# Patient Record
Sex: FEMALE | Race: WHITE | NOT HISPANIC OR LATINO | Employment: OTHER | URBAN - METROPOLITAN AREA
[De-identification: names, ages, dates, MRNs, and addresses within clinical notes are randomized per-mention and may not be internally consistent; named-entity substitution may affect disease eponyms.]

---

## 2018-09-22 ENCOUNTER — APPOINTMENT (EMERGENCY)
Dept: CT IMAGING | Facility: HOSPITAL | Age: 63
End: 2018-09-22
Payer: COMMERCIAL

## 2018-09-22 ENCOUNTER — HOSPITAL ENCOUNTER (EMERGENCY)
Facility: HOSPITAL | Age: 63
Discharge: HOME | End: 2018-09-22
Attending: EMERGENCY MEDICINE | Admitting: EMERGENCY MEDICINE
Payer: COMMERCIAL

## 2018-09-22 ENCOUNTER — APPOINTMENT (EMERGENCY)
Dept: RADIOLOGY | Facility: HOSPITAL | Age: 63
End: 2018-09-22
Payer: COMMERCIAL

## 2018-09-22 VITALS
SYSTOLIC BLOOD PRESSURE: 106 MMHG | DIASTOLIC BLOOD PRESSURE: 56 MMHG | RESPIRATION RATE: 16 BRPM | TEMPERATURE: 98.4 F | OXYGEN SATURATION: 96 % | HEART RATE: 83 BPM | WEIGHT: 134.2 LBS
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
## 2018-09-22 DIAGNOSIS — E04.1 THYROID NODULE: ICD-10-CM

## 2018-09-22 DIAGNOSIS — M47.812 DJD (DEGENERATIVE JOINT DISEASE) OF CERVICAL SPINE: ICD-10-CM

## 2018-09-22 LAB
ALBUMIN SERPL BCP-MCNC: 3 G/DL (ref 3.5–5)
ALP SERPL-CCNC: 38 U/L (ref 46–116)
ALT SERPL W P-5'-P-CCNC: 17 U/L (ref 12–78)
ANION GAP SERPL CALCULATED.3IONS-SCNC: 9 MMOL/L (ref 4–13)
AST SERPL W P-5'-P-CCNC: 15 U/L (ref 5–45)
BASOPHILS # BLD AUTO: 0.02 THOUSANDS/ΜL (ref 0–0.1)
BASOPHILS NFR BLD AUTO: 0 % (ref 0–1)
BILIRUB SERPL-MCNC: 0.28 MG/DL (ref 0.2–1)
BUN SERPL-MCNC: 17 MG/DL (ref 5–25)
CALCIUM SERPL-MCNC: 8.8 MG/DL (ref 8.3–10.1)
CHLORIDE SERPL-SCNC: 108 MMOL/L (ref 100–108)
CO2 SERPL-SCNC: 26 MMOL/L (ref 21–32)
CREAT SERPL-MCNC: 0.85 MG/DL (ref 0.6–1.3)
EOSINOPHIL # BLD AUTO: 0 THOUSAND/ΜL (ref 0–0.61)
EOSINOPHIL NFR BLD AUTO: 0 % (ref 0–6)
ERYTHROCYTE [DISTWIDTH] IN BLOOD BY AUTOMATED COUNT: 13.2 % (ref 11.6–15.1)
GFR SERPL CREATININE-BSD FRML MDRD: 73 ML/MIN/1.73SQ M
GLUCOSE SERPL-MCNC: 97 MG/DL (ref 65–140)
HCT VFR BLD AUTO: 37.4 % (ref 34.8–46.1)
HGB BLD-MCNC: 12.1 G/DL (ref 11.5–15.4)
IMM GRANULOCYTES # BLD AUTO: 0.02 THOUSAND/UL (ref 0–0.2)
IMM GRANULOCYTES NFR BLD AUTO: 0 % (ref 0–2)
LYMPHOCYTES # BLD AUTO: 1.03 THOUSANDS/ΜL (ref 0.6–4.47)
LYMPHOCYTES NFR BLD AUTO: 17 % (ref 14–44)
MCH RBC QN AUTO: 31.1 PG (ref 26.8–34.3)
MCHC RBC AUTO-ENTMCNC: 32.4 G/DL (ref 31.4–37.4)
MCV RBC AUTO: 96 FL (ref 82–98)
MONOCYTES # BLD AUTO: 0.71 THOUSAND/ΜL (ref 0.17–1.22)
MONOCYTES NFR BLD AUTO: 12 % (ref 4–12)
NEUTROPHILS # BLD AUTO: 4.14 THOUSANDS/ΜL (ref 1.85–7.62)
NEUTS SEG NFR BLD AUTO: 71 % (ref 43–75)
NRBC BLD AUTO-RTO: 0 /100 WBCS
PLATELET # BLD AUTO: 191 THOUSANDS/UL (ref 149–390)
PMV BLD AUTO: 9.4 FL (ref 8.9–12.7)
POTASSIUM SERPL-SCNC: 4.2 MMOL/L (ref 3.5–5.3)
PROT SERPL-MCNC: 6 G/DL (ref 6.4–8.2)
RBC # BLD AUTO: 3.89 MILLION/UL (ref 3.81–5.12)
SODIUM SERPL-SCNC: 143 MMOL/L (ref 136–145)
TROPONIN I SERPL-MCNC: <0.02 NG/ML
WBC # BLD AUTO: 5.92 THOUSAND/UL (ref 4.31–10.16)

## 2018-09-22 PROCEDURE — 84484 ASSAY OF TROPONIN QUANT: CPT | Performed by: EMERGENCY MEDICINE

## 2018-09-22 PROCEDURE — 70450 CT HEAD/BRAIN W/O DYE: CPT

## 2018-09-22 PROCEDURE — 80053 COMPREHEN METABOLIC PANEL: CPT | Performed by: EMERGENCY MEDICINE

## 2018-09-22 PROCEDURE — 71046 X-RAY EXAM CHEST 2 VIEWS: CPT

## 2018-09-22 PROCEDURE — 99285 EMERGENCY DEPT VISIT HI MDM: CPT

## 2018-09-22 PROCEDURE — 93005 ELECTROCARDIOGRAM TRACING: CPT

## 2018-09-22 PROCEDURE — 72125 CT NECK SPINE W/O DYE: CPT

## 2018-09-22 PROCEDURE — 36415 COLL VENOUS BLD VENIPUNCTURE: CPT

## 2018-09-22 PROCEDURE — 85025 COMPLETE CBC W/AUTO DIFF WBC: CPT | Performed by: EMERGENCY MEDICINE

## 2018-09-22 RX ORDER — NAPROXEN 500 MG/1
500 TABLET ORAL 2 TIMES DAILY WITH MEALS
Qty: 10 TABLET | Refills: 0 | Status: SHIPPED | OUTPATIENT
Start: 2018-09-22 | End: 2021-06-28 | Stop reason: HOSPADM

## 2018-09-22 RX ORDER — LORAZEPAM 1 MG/1
1 TABLET ORAL
Status: ON HOLD | COMMUNITY
End: 2022-03-08 | Stop reason: SDUPTHER

## 2018-09-22 RX ORDER — KETOROLAC TROMETHAMINE 30 MG/ML
15 INJECTION, SOLUTION INTRAMUSCULAR; INTRAVENOUS ONCE
Status: DISCONTINUED | OUTPATIENT
Start: 2018-09-22 | End: 2018-09-22 | Stop reason: HOSPADM

## 2018-09-22 RX ORDER — BUPROPION HYDROCHLORIDE 100 MG/1
100 TABLET ORAL 2 TIMES DAILY
COMMUNITY

## 2018-09-22 RX ORDER — MORPHINE SULFATE 15 MG/1
15 TABLET, FILM COATED, EXTENDED RELEASE ORAL 2 TIMES DAILY
Status: ON HOLD | COMMUNITY
End: 2022-03-08 | Stop reason: SDUPTHER

## 2018-09-22 RX ORDER — CARVEDILOL 25 MG/1
25 TABLET ORAL 2 TIMES DAILY WITH MEALS
COMMUNITY

## 2018-09-22 RX ORDER — ACETAMINOPHEN 325 MG/1
650 TABLET ORAL ONCE
Status: COMPLETED | OUTPATIENT
Start: 2018-09-22 | End: 2018-09-22

## 2018-09-22 RX ORDER — DULOXETIN HYDROCHLORIDE 60 MG/1
60 CAPSULE, DELAYED RELEASE ORAL 2 TIMES DAILY
COMMUNITY
End: 2021-06-28 | Stop reason: HOSPADM

## 2018-09-22 RX ADMIN — ACETAMINOPHEN 650 MG: 325 TABLET, FILM COATED ORAL at 14:23
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
## 2018-09-22 NOTE — DISCHARGE INSTRUCTIONS
Neck Pain   WHAT YOU NEED TO KNOW:   You may have sudden neck pain that increases quickly  You may instead feel pain build slowly over time  Neck pain may go away in a few days or weeks, or it may continue for months  The pain may come and go, or be worse with certain movements  The pain may be only in your neck, or it may move to your arms, back, or shoulders  You may also have pain that starts in another body area and moves to your neck  Some types of neck pain are permanent  DISCHARGE INSTRUCTIONS:   Return to the emergency department if:   · You have an injury that causes neck pain and shooting pain down your arms or legs  · Your neck pain suddenly becomes severe  · You have neck pain along with numbness, tingling, or weakness in your arms or legs  · You have a stiff neck, a headache, and a fever  Contact your healthcare provider if:   · You have new or worsening symptoms  · Your symptoms continue even after treatment  · You have questions or concerns about your condition or care  Medicines: You may need any of the following:  · NSAIDs  , such as ibuprofen, help decrease swelling, pain, and fever  This medicine is available without a doctor's order  Ask your healthcare provider which medicine to take and how often to take it  Follow directions  NSAIDs can cause stomach bleeding or kidney problems if not taken correctly  If you take blood thinner medicine, always ask if NSAIDs are safe for you  · Acetaminophen  helps decrease pain and fever  Ask your healthcare provider how much to take and how often to take it  Follow directions  Acetaminophen can cause liver damage if not taken correctly  · Steroid medicine  may be used to reduce inflammation  This can help relieve pain caused by swelling  · Take your medicine as directed  Contact your healthcare provider if you think your medicine is not helping or if you have side effects  Tell him or her if you are allergic to any medicine  Keep a list of the medicines, vitamins, and herbs you take  Include the amounts, and when and why you take them  Bring the list or the pill bottles to follow-up visits  Carry your medicine list with you in case of an emergency  Manage or prevent neck pain:   · Rest your neck as directed  Do not make sudden movements, such as turning your head quickly  Your healthcare provider may recommend you wear a cervical collar for a short time  The collar will prevent you from moving your head  This will give your neck time to heal if an injury is causing your neck pain  Ask your healthcare provider when you can return to sports or other normal daily activities  · Apply heat as directed  Heat helps relieve pain and swelling  Use a heat wrap, or soak a small towel in warm water  Wring out the extra water  Apply the heat wrap or towel for 20 minutes every hour, or as directed  · Apply ice as directed  Ice helps relieve pain and swelling, and can help prevent tissue damage  Use an ice pack, or put ice in a bag  Cover the ice pack or back with a towel before you apply it to your neck  Apply the ice pack or ice for 15 minutes every hour, or as directed  Your healthcare provider can tell you how often to apply ice  · Do neck exercises as directed  Neck exercises help strengthen the muscles and increase range of motion  Your healthcare provider will tell you which exercises are right for you  He may give you instructions, or he may recommend that you work with a physical therapist  Your healthcare provider or therapist can make sure you are doing the exercises correctly  · Maintain good posture  Try to keep your head and shoulders lifted when you sit  If you work in front of a computer, make sure the monitor is at the right level  You should not need to look up down to see the screen  You should also not have to lean forward to be able to read what is on the screen   Make sure your keyboard, mouse, and other computer items are placed where you do not have to extend your shoulder to reach them  Get up often if you work in front of a computer or sit for long periods of time  Stretch or walk around to keep your neck muscles loose  Follow up with your healthcare provider as directed: Your healthcare provider may refer you to a specialist if your pain does not get better with treatment  Write down your questions so you remember to ask them during your visits  © 2017 [facility] Information is for End User's use only and may not be sold, redistributed or otherwise used for commercial purposes  All illustrations and images included in CareNotes® are the copyrighted property of A D A M [employer] Inc  or [employer]  The above information is an  only  It is not intended as medical advice for individual conditions or treatments  Talk to your doctor, nurse or pharmacist before following any medical regimen to see if it is safe and effective for you  Thyroid Nodules   WHAT YOU NEED TO KNOW:   Thyroid nodules are growths on your thyroid gland  Your thyroid makes hormones that help control your body temperature, heart rate, and growth  The hormones also control how fast your body uses food for energy  Some nodules are lumps of tissue, and others are filled with fluid  DISCHARGE INSTRUCTIONS:   Medicines:   · Thyroid medicine  is given to bring your thyroid hormone levels back to a normal range  · Radioactive iodine  is given to damage cells in your thyroid gland and decrease the size of your nodules  · Take your medicine as directed  Contact your healthcare provider if you think your medicine is not helping or if you have side effects  Tell him or her if you are allergic to any medicine  Keep a list of the medicines, vitamins, and herbs you take  Include the amounts, and when and why you take them  Bring the list or the pill bottles to follow-up visits   Carry your medicine list with you in case of an emergency  Follow up with your healthcare provider as directed: You may need frequent blood tests to check your thyroid hormone levels  You may also need tests such as an ultrasound to check if any nodules are growing or have returned  If you had surgery, follow directions about how to care for your wound  Write down your questions so you remember to ask them during your visits  Eat iodine-rich foods:  Examples include fish, seaweed, dairy products, eggs, beans, and lean meat  Iodized salt also contains iodine  You may need to use iodized table salt when you cook and season your food  Iodine may be added to bread or to your drinking water  Ask for a list of foods that contain iodine, and ask how much iodine you need each day  Contact your healthcare provider if:   · You have a new cough that does not improve  · You begin choking or have new or increased trouble swallowing  · Your voice becomes hoarse  · You are losing weight without trying  · You have questions or concerns about your condition or care  Seek immediate care or call 911 if:   · You have redness, swelling, or drainage at your surgery site  · You have sudden chest pain or trouble breathing  · Your symptoms worsen, even after you take your medicines  © 2017 [address]  Information is for End User's use only and may not be sold, redistributed or otherwise used for commercial purposes  All illustrations and images included in CareNotes® are the copyrighted property of [employer]  or [employer] Schilling  The above information is an  only  It is not intended as medical advice for individual conditions or treatments  Talk to your doctor, nurse or pharmacist before following any medical regimen to see if it is safe and effective for you
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
## 2018-09-23 LAB
ATRIAL RATE: 78 BPM
P AXIS: 66 DEGREES
PR INTERVAL: 170 MS
QRS AXIS: 49 DEGREES
QRSD INTERVAL: 94 MS
QT INTERVAL: 358 MS
QTC INTERVAL: 408 MS
T WAVE AXIS: 70 DEGREES
VENTRICULAR RATE: 78 BPM

## 2018-09-23 PROCEDURE — 93010 ELECTROCARDIOGRAM REPORT: CPT | Performed by: INTERNAL MEDICINE

## 2020-01-20 ENCOUNTER — APPOINTMENT (EMERGENCY)
Dept: RADIOLOGY | Facility: HOSPITAL | Age: 65
End: 2020-01-20
Payer: MEDICARE

## 2020-01-20 ENCOUNTER — HOSPITAL ENCOUNTER (EMERGENCY)
Facility: HOSPITAL | Age: 65
Discharge: HOME | End: 2020-01-20
Attending: EMERGENCY MEDICINE | Admitting: EMERGENCY MEDICINE
Payer: MEDICARE

## 2020-01-20 VITALS
DIASTOLIC BLOOD PRESSURE: 67 MMHG | HEART RATE: 89 BPM | RESPIRATION RATE: 18 BRPM | TEMPERATURE: 97.8 F | SYSTOLIC BLOOD PRESSURE: 137 MMHG | WEIGHT: 140.43 LBS | OXYGEN SATURATION: 99 %

## 2020-01-20 DIAGNOSIS — R07.81 RIB PAIN ON RIGHT SIDE: Primary | ICD-10-CM

## 2020-01-20 PROCEDURE — 99282 EMERGENCY DEPT VISIT SF MDM: CPT | Performed by: PHYSICIAN ASSISTANT

## 2020-01-20 PROCEDURE — 99283 EMERGENCY DEPT VISIT LOW MDM: CPT

## 2020-01-20 PROCEDURE — 71101 X-RAY EXAM UNILAT RIBS/CHEST: CPT

## 2020-01-21 NOTE — DISCHARGE INSTRUCTIONS
Continue Tylenol and ibuprofen at home as needed for pain  Can try over-the-counter pain patches such as Salonpas as needed for pain  Rest, ice may also help alleviate symptoms  Continue regular deep breathing  Follow-up with PCP for monitoring of symptoms  Return to ED if symptoms worsen

## 2020-01-21 NOTE — ED PROVIDER NOTES
History  Chief Complaint   Patient presents with    Rib Pain     right rib pain, states walking down steps "i was trying to go fast, i was in [place] i was trying to get the L" states fell grabbed hand rail and ribs struck hand rail      Patient is a 68-year-old female with a past medical history of asthma, anxiety, chronic pain who presents with right-sided rib pain for 1 day  Patient states that she was rushing down the stairs to catch the L in [facility]," when she stumbled on the stairs  She was falling forward, but reached for the railing and caught herself against the railing, slamming the right side of her chest on the railing  She denies any fall, head injury, LOC, headaches, vision changes, dizziness, lightheadedness, numbness, tingling, nausea, vomiting  Patient states the pain is primarily along the right-sided ribcage up under her right breast   She states the pain is worse with deep breaths, and denies any pain at rest, swelling, erythema, bleeding, skin changes  Patient states she has been taking naproxen and ibuprofen, which provides relief, but the pain persists  Patient denies any chest pain, palpitations, shortness of breath, wheezing  Patient states she is otherwise in her usual state of health and denies any fevers, chills, diaphoresis, congestion, cough, abdominal pain, diarrhea, urinary changes, or rash  Prior to Admission Medications   Prescriptions Last Dose Informant Patient Reported? Taking?    DULoxetine (CYMBALTA) 60 mg delayed release capsule   Yes No   Sig: Take 60 mg by mouth 2 (two) times a day   LORazepam (ATIVAN) 1 mg tablet   Yes No   Sig: Take 1 mg by mouth daily at bedtime   LamoTRIgine (LAMICTAL PO)   Yes No   Sig: Take 1 tablet by mouth daily at bedtime   buPROPion (WELLBUTRIN) 100 mg tablet   Yes No   Sig: Take 100 mg by mouth 2 (two) times a day   carvedilol (COREG) 25 mg tablet   Yes No   Sig: Take 25 mg by mouth 2 (two) times a day with meals   morphine (MS CONTIN) 15 mg 12 hr tablet   Yes No   Sig: Take 15 mg by mouth 2 (two) times a day   naproxen (NAPROSYN) 500 mg tablet   No No   Sig: Take 1 tablet (500 mg total) by mouth 2 (two) times a day with meals for 5 days      Facility-Administered Medications: None       Past Medical History:   Diagnosis Date    Anxiety     Asthma     Chronic pain     Depression     Enterovirus heart infection        Past Surgical History:   Procedure Laterality Date    FIXATION KYPHOPLASTY LUMBAR SPINE      HYSTERECTOMY      REPAIR RECTOCELE         History reviewed  No pertinent family history  I have reviewed and agree with the history as documented  Social History     Tobacco Use    Smoking status: Never Smoker    Smokeless tobacco: Never Used   Substance Use Topics    Alcohol use: Yes     Comment: social    Drug use: No        Review of Systems   Constitutional: Negative for chills, diaphoresis and fever  HENT: Negative for congestion and sore throat  Eyes: Negative for visual disturbance  Respiratory: Negative for cough, shortness of breath, wheezing and stridor  Cardiovascular: Positive for chest pain (right rib pain and chest wall pain)  Negative for palpitations and leg swelling  Gastrointestinal: Negative for abdominal pain, diarrhea, nausea and vomiting  Genitourinary: Negative for difficulty urinating  Musculoskeletal: Negative for myalgias, neck pain and neck stiffness  Skin: Negative for color change, pallor and rash  Neurological: Negative for dizziness, weakness, light-headedness, numbness and headaches  All other systems reviewed and are negative  Physical Exam  Physical Exam   Constitutional: She is oriented to person, place, and time  Vital signs are normal  She appears well-developed and well-nourished  She is active and cooperative  Non-toxic appearance  She does not have a sickly appearance  She does not appear ill  No distress     Patient appears well, no acute distress, nontoxic-appearing   HENT:   Head: Normocephalic and atraumatic  Head is without raccoon's eyes and without Narvaez's sign  Right Ear: Tympanic membrane, external ear and ear canal normal  No hemotympanum  Left Ear: Tympanic membrane, external ear and ear canal normal  No hemotympanum  Nose: Nose normal    Mouth/Throat: Uvula is midline, oropharynx is clear and moist and mucous membranes are normal    Eyes: Pupils are equal, round, and reactive to light  Conjunctivae and EOM are normal    Neck: Normal range of motion  Neck supple  No spinous process tenderness and no muscular tenderness present  Normal range of motion present  Cardiovascular: Normal rate, regular rhythm, S1 normal, S2 normal, normal heart sounds, intact distal pulses and normal pulses  Pulses:       Radial pulses are 2+ on the right side, and 2+ on the left side  Pulmonary/Chest: Effort normal and breath sounds normal  No stridor  No respiratory distress  She has no decreased breath sounds  She has no wheezes  She has no rhonchi  She has no rales  She exhibits tenderness  She exhibits no mass, no laceration, no crepitus, no edema, no deformity, no swelling and no retraction  Abdominal: Soft  Normal appearance and bowel sounds are normal  She exhibits no distension  There is no tenderness  Musculoskeletal: Normal range of motion  Lymphadenopathy:     She has no cervical adenopathy  Neurological: She is alert and oriented to person, place, and time  GCS eye subscore is 4  GCS verbal subscore is 5  GCS motor subscore is 6  Skin: Skin is warm and dry  Capillary refill takes less than 2 seconds  She is not diaphoretic  Nursing note and vitals reviewed        Vital Signs  ED Triage Vitals   Temperature Pulse Respirations Blood Pressure SpO2   01/1954 01/20/20 1955 01/20/20 1955 01/20/20 1955 01/20/20 1955   97 8 °F (36 6 °C) 89 18 137/67 99 %      Temp Source Heart Rate Source Patient Position - Orthostatic VS BP Location FiO2 (%)   01/1954 -- 01/20/20 1955 01/20/20 1955 --   Oral  Sitting Right arm       Pain Score       01/20/20 1955       7           Vitals:    01/20/20 1955   BP: 137/67   Pulse: 89   Patient Position - Orthostatic VS: Sitting         Visual Acuity      ED Medications  Medications - No data to display    Diagnostic Studies  Results Reviewed     None                 XR ribs right w pa chest min 3 views   ED Interpretation by Bryan Jacob PA-C (01/20 2143)   No acute pathology noted  by Heath Woody (01/20 2003)                 Procedures  Procedures         ED Course                               MDM  Number of Diagnoses or Management Options  Rib pain on right side:   Diagnosis management comments: Patient declined any pain medication while in ED  Reviewed results of x-ray, no acute pathology noted  Informed that we will call if radiology notes any significant findings  Reviewed symptomatic treatment at home, encourage regular deep breathing  Patient states that she has pain management medications at home that she can use for pain  Recommended follow-up with PCP for monitoring of symptoms  Reviewed red flags symptoms and strict return to ED instructions  Patient notes understanding and agrees to plan  Disposition  Final diagnoses:   Rib pain on right side     Time reflects when diagnosis was documented in both MDM as applicable and the Disposition within this note     Time User Action Codes Description Comment    1/20/2020  9:44 PM Tawnya Torres Add [R07 81] Rib pain on right side       ED Disposition     ED Disposition Condition Date/Time Comment    Discharge Stable Mon Jan 20, 2020  9:44 PM Osiris Countess discharge to home/self care              Follow-up Information     Follow up With Specialties Details Why Contact Info Additional Information    Brenda Stringer,  Family Medicine In 5 days  [address] [facility] Emergency Medicine  If symptoms worsen [facility] [address]  [phone] [place] ED, [address]  , [place], [place], [address]          Discharge Medication List as of 1/20/2020  9:45 PM      CONTINUE these medications which have NOT CHANGED    Details   buPROPion (WELLBUTRIN) 100 mg tablet Take 100 mg by mouth 2 (two) times a day, Historical Med      carvedilol (COREG) 25 mg tablet Take 25 mg by mouth 2 (two) times a day with meals, Historical Med      DULoxetine (CYMBALTA) 60 mg delayed release capsule Take 60 mg by mouth 2 (two) times a day, Historical Med      LamoTRIgine (LAMICTAL PO) Take 1 tablet by mouth daily at bedtime, Historical Med      LORazepam (ATIVAN) 1 mg tablet Take 1 mg by mouth daily at bedtime, Historical Med      morphine (MS CONTIN) 15 mg 12 hr tablet Take 15 mg by mouth 2 (two) times a day, Historical Med      naproxen (NAPROSYN) 500 mg tablet Take 1 tablet (500 mg total) by mouth 2 (two) times a day with meals for 5 days, Starting Sat 9/22/2018, Until Thu 9/27/2018, Print           No discharge procedures on file      ED Provider  Electronically Signed by           Bryan Jacob PA-C  01/21/20 0111

## 2020-01-30 ENCOUNTER — TRANSCRIBE ORDERS (OUTPATIENT)
Dept: ADMINISTRATIVE | Facility: HOSPITAL | Age: 65
End: 2020-01-30

## 2020-01-30 DIAGNOSIS — M06.9 RHEUMATOID ARTHRITIS INVOLVING MULTIPLE SITES, UNSPECIFIED RHEUMATOID FACTOR PRESENCE: Primary | ICD-10-CM

## 2020-02-04 ENCOUNTER — HOSPITAL ENCOUNTER (OUTPATIENT)
Dept: RADIOLOGY | Facility: HOSPITAL | Age: 65
Discharge: HOME | End: 2020-02-04
Payer: MEDICARE

## 2020-02-04 DIAGNOSIS — M06.9 RHEUMATOID ARTHRITIS INVOLVING MULTIPLE SITES, UNSPECIFIED RHEUMATOID FACTOR PRESENCE: ICD-10-CM

## 2020-02-04 PROCEDURE — 76881 US COMPL JOINT R-T W/IMG: CPT

## 2021-03-30 ENCOUNTER — IMMUNIZATIONS (OUTPATIENT)
Dept: FAMILY MEDICINE CLINIC | Facility: HOSPITAL | Age: 66
End: 2021-03-30

## 2021-03-30 DIAGNOSIS — Z23 ENCOUNTER FOR IMMUNIZATION: Primary | ICD-10-CM

## 2021-03-30 PROCEDURE — 0011A SARS-COV-2 / COVID-19 MRNA VACCINE (MODERNA) 100 MCG: CPT

## 2021-03-30 PROCEDURE — 91301 SARS-COV-2 / COVID-19 MRNA VACCINE (MODERNA) 100 MCG: CPT

## 2021-05-12 ENCOUNTER — IMMUNIZATIONS (OUTPATIENT)
Dept: FAMILY MEDICINE CLINIC | Facility: HOSPITAL | Age: 66
End: 2021-05-12

## 2021-05-12 DIAGNOSIS — Z23 ENCOUNTER FOR IMMUNIZATION: Primary | ICD-10-CM

## 2021-05-12 PROCEDURE — 0012A SARS-COV-2 / COVID-19 MRNA VACCINE (MODERNA) 100 MCG: CPT

## 2021-05-12 PROCEDURE — 91301 SARS-COV-2 / COVID-19 MRNA VACCINE (MODERNA) 100 MCG: CPT

## 2021-06-25 ENCOUNTER — HOSPITAL ENCOUNTER (INPATIENT)
Facility: HOSPITAL | Age: 66
LOS: 3 days | Discharge: HOME HEALTH | DRG: 682 | End: 2021-06-28
Attending: EMERGENCY MEDICINE | Admitting: INTERNAL MEDICINE
Payer: MEDICARE

## 2021-06-25 ENCOUNTER — APPOINTMENT (EMERGENCY)
Dept: RADIOLOGY | Facility: HOSPITAL | Age: 66
DRG: 682 | End: 2021-06-25
Payer: MEDICARE

## 2021-06-25 ENCOUNTER — APPOINTMENT (EMERGENCY)
Dept: CT IMAGING | Facility: HOSPITAL | Age: 66
DRG: 682 | End: 2021-06-25
Payer: MEDICARE

## 2021-06-25 DIAGNOSIS — K21.9 GASTROESOPHAGEAL REFLUX DISEASE WITHOUT ESOPHAGITIS: ICD-10-CM

## 2021-06-25 DIAGNOSIS — D64.9 ANEMIA: ICD-10-CM

## 2021-06-25 DIAGNOSIS — S09.90XA INJURY OF HEAD, INITIAL ENCOUNTER: ICD-10-CM

## 2021-06-25 DIAGNOSIS — R26.2 AMBULATORY DYSFUNCTION: ICD-10-CM

## 2021-06-25 DIAGNOSIS — J45.909 ASTHMA: ICD-10-CM

## 2021-06-25 DIAGNOSIS — R41.0 DELIRIUM: Primary | ICD-10-CM

## 2021-06-25 DIAGNOSIS — M06.9 RHEUMATOID ARTHRITIS (HCC): ICD-10-CM

## 2021-06-25 DIAGNOSIS — N17.9 AKI (ACUTE KIDNEY INJURY) (HCC): ICD-10-CM

## 2021-06-25 DIAGNOSIS — N39.0 ACUTE URINARY TRACT INFECTION: ICD-10-CM

## 2021-06-25 DIAGNOSIS — G89.29 CHRONIC PAIN: ICD-10-CM

## 2021-06-25 DIAGNOSIS — R29.6 MULTIPLE FALLS: ICD-10-CM

## 2021-06-25 DIAGNOSIS — K59.00 CONSTIPATION: ICD-10-CM

## 2021-06-25 LAB
ALBUMIN SERPL BCP-MCNC: 3 G/DL (ref 3.5–5)
ALP SERPL-CCNC: 70 U/L (ref 46–116)
ALT SERPL W P-5'-P-CCNC: 15 U/L (ref 12–78)
AMMONIA PLAS-SCNC: <10 UMOL/L (ref 11–35)
AMPHETAMINES SERPL QL SCN: NEGATIVE
ANION GAP SERPL CALCULATED.3IONS-SCNC: 10 MMOL/L (ref 4–13)
AST SERPL W P-5'-P-CCNC: 28 U/L (ref 5–45)
ATRIAL RATE: 326 BPM
BACTERIA UR QL AUTO: ABNORMAL /HPF
BARBITURATES UR QL: NEGATIVE
BASOPHILS # BLD AUTO: 0.02 THOUSANDS/ΜL (ref 0–0.1)
BASOPHILS NFR BLD AUTO: 0 % (ref 0–1)
BENZODIAZ UR QL: NEGATIVE
BILIRUB SERPL-MCNC: 0.54 MG/DL (ref 0.2–1)
BILIRUB UR QL STRIP: NEGATIVE
BUN SERPL-MCNC: 29 MG/DL (ref 5–25)
CALCIUM ALBUM COR SERPL-MCNC: 11.3 MG/DL (ref 8.3–10.1)
CALCIUM SERPL-MCNC: 10.5 MG/DL (ref 8.3–10.1)
CAOX CRY URNS QL MICRO: ABNORMAL /HPF
CHLORIDE SERPL-SCNC: 101 MMOL/L (ref 100–108)
CLARITY UR: CLEAR
CO2 SERPL-SCNC: 26 MMOL/L (ref 21–32)
COCAINE UR QL: NEGATIVE
COLOR UR: YELLOW
CREAT SERPL-MCNC: 1.91 MG/DL (ref 0.6–1.3)
EOSINOPHIL # BLD AUTO: 0 THOUSAND/ΜL (ref 0–0.61)
EOSINOPHIL NFR BLD AUTO: 0 % (ref 0–6)
ERYTHROCYTE [DISTWIDTH] IN BLOOD BY AUTOMATED COUNT: 12.7 % (ref 11.6–15.1)
ETHANOL SERPL-MCNC: <3 MG/DL (ref 0–3)
GFR SERPL CREATININE-BSD FRML MDRD: 27 ML/MIN/1.73SQ M
GLUCOSE SERPL-MCNC: 83 MG/DL (ref 65–140)
GLUCOSE UR STRIP-MCNC: NEGATIVE MG/DL
HCT VFR BLD AUTO: 32.3 % (ref 34.8–46.1)
HGB BLD-MCNC: 10.7 G/DL (ref 11.5–15.4)
HGB UR QL STRIP.AUTO: NEGATIVE
IMM GRANULOCYTES # BLD AUTO: 0.02 THOUSAND/UL (ref 0–0.2)
IMM GRANULOCYTES NFR BLD AUTO: 0 % (ref 0–2)
KETONES UR STRIP-MCNC: ABNORMAL MG/DL
LEUKOCYTE ESTERASE UR QL STRIP: ABNORMAL
LYMPHOCYTES # BLD AUTO: 0.95 THOUSANDS/ΜL (ref 0.6–4.47)
LYMPHOCYTES NFR BLD AUTO: 17 % (ref 14–44)
MCH RBC QN AUTO: 30.9 PG (ref 26.8–34.3)
MCHC RBC AUTO-ENTMCNC: 33.1 G/DL (ref 31.4–37.4)
MCV RBC AUTO: 93 FL (ref 82–98)
METHADONE UR QL: NEGATIVE
MONOCYTES # BLD AUTO: 0.6 THOUSAND/ΜL (ref 0.17–1.22)
MONOCYTES NFR BLD AUTO: 11 % (ref 4–12)
NEUTROPHILS # BLD AUTO: 4.04 THOUSANDS/ΜL (ref 1.85–7.62)
NEUTS SEG NFR BLD AUTO: 72 % (ref 43–75)
NITRITE UR QL STRIP: POSITIVE
NON-SQ EPI CELLS URNS QL MICRO: ABNORMAL /HPF
NRBC BLD AUTO-RTO: 0 /100 WBCS
NT-PROBNP SERPL-MCNC: 2177 PG/ML
OPIATES UR QL SCN: POSITIVE
OXYCODONE+OXYMORPHONE UR QL SCN: NEGATIVE
PCP UR QL: NEGATIVE
PH UR STRIP.AUTO: 5.5 [PH] (ref 4.5–8)
PLATELET # BLD AUTO: 164 THOUSANDS/UL (ref 149–390)
PMV BLD AUTO: 9.6 FL (ref 8.9–12.7)
POTASSIUM SERPL-SCNC: 3.9 MMOL/L (ref 3.5–5.3)
PROT SERPL-MCNC: 6.1 G/DL (ref 6.4–8.2)
PROT UR STRIP-MCNC: NEGATIVE MG/DL
QRS AXIS: 49 DEGREES
QRSD INTERVAL: 92 MS
QT INTERVAL: 352 MS
QTC INTERVAL: 403 MS
RBC # BLD AUTO: 3.46 MILLION/UL (ref 3.81–5.12)
RBC #/AREA URNS AUTO: ABNORMAL /HPF
SODIUM SERPL-SCNC: 137 MMOL/L (ref 136–145)
SP GR UR STRIP.AUTO: 1.02 (ref 1–1.03)
T WAVE AXIS: 58 DEGREES
THC UR QL: POSITIVE
TROPONIN I SERPL-MCNC: <0.02 NG/ML
UROBILINOGEN UR QL STRIP.AUTO: 0.2 E.U./DL
VENTRICULAR RATE: 79 BPM
WBC # BLD AUTO: 5.63 THOUSAND/UL (ref 4.31–10.16)
WBC #/AREA URNS AUTO: ABNORMAL /HPF

## 2021-06-25 PROCEDURE — 80307 DRUG TEST PRSMV CHEM ANLYZR: CPT | Performed by: EMERGENCY MEDICINE

## 2021-06-25 PROCEDURE — 83550 IRON BINDING TEST: CPT | Performed by: PHYSICIAN ASSISTANT

## 2021-06-25 PROCEDURE — 82077 ASSAY SPEC XCP UR&BREATH IA: CPT | Performed by: EMERGENCY MEDICINE

## 2021-06-25 PROCEDURE — 93010 ELECTROCARDIOGRAM REPORT: CPT | Performed by: INTERNAL MEDICINE

## 2021-06-25 PROCEDURE — 1124F ACP DISCUSS-NO DSCNMKR DOCD: CPT | Performed by: EMERGENCY MEDICINE

## 2021-06-25 PROCEDURE — 83880 ASSAY OF NATRIURETIC PEPTIDE: CPT | Performed by: EMERGENCY MEDICINE

## 2021-06-25 PROCEDURE — 84484 ASSAY OF TROPONIN QUANT: CPT | Performed by: EMERGENCY MEDICINE

## 2021-06-25 PROCEDURE — 81001 URINALYSIS AUTO W/SCOPE: CPT

## 2021-06-25 PROCEDURE — 87086 URINE CULTURE/COLONY COUNT: CPT

## 2021-06-25 PROCEDURE — 82746 ASSAY OF FOLIC ACID SERUM: CPT | Performed by: PHYSICIAN ASSISTANT

## 2021-06-25 PROCEDURE — 83540 ASSAY OF IRON: CPT | Performed by: PHYSICIAN ASSISTANT

## 2021-06-25 PROCEDURE — 36415 COLL VENOUS BLD VENIPUNCTURE: CPT | Performed by: EMERGENCY MEDICINE

## 2021-06-25 PROCEDURE — 80053 COMPREHEN METABOLIC PANEL: CPT | Performed by: EMERGENCY MEDICINE

## 2021-06-25 PROCEDURE — 96365 THER/PROPH/DIAG IV INF INIT: CPT

## 2021-06-25 PROCEDURE — 82728 ASSAY OF FERRITIN: CPT | Performed by: PHYSICIAN ASSISTANT

## 2021-06-25 PROCEDURE — 99285 EMERGENCY DEPT VISIT HI MDM: CPT

## 2021-06-25 PROCEDURE — 99285 EMERGENCY DEPT VISIT HI MDM: CPT | Performed by: EMERGENCY MEDICINE

## 2021-06-25 PROCEDURE — 93005 ELECTROCARDIOGRAM TRACING: CPT

## 2021-06-25 PROCEDURE — 82140 ASSAY OF AMMONIA: CPT | Performed by: EMERGENCY MEDICINE

## 2021-06-25 PROCEDURE — 70450 CT HEAD/BRAIN W/O DYE: CPT

## 2021-06-25 PROCEDURE — 85025 COMPLETE CBC W/AUTO DIFF WBC: CPT | Performed by: EMERGENCY MEDICINE

## 2021-06-25 PROCEDURE — 87186 SC STD MICRODIL/AGAR DIL: CPT

## 2021-06-25 PROCEDURE — 82607 VITAMIN B-12: CPT | Performed by: PHYSICIAN ASSISTANT

## 2021-06-25 PROCEDURE — 96368 THER/DIAG CONCURRENT INF: CPT

## 2021-06-25 PROCEDURE — 71045 X-RAY EXAM CHEST 1 VIEW: CPT

## 2021-06-25 PROCEDURE — 73502 X-RAY EXAM HIP UNI 2-3 VIEWS: CPT

## 2021-06-25 PROCEDURE — 87077 CULTURE AEROBIC IDENTIFY: CPT

## 2021-06-25 RX ORDER — POLYETHYLENE GLYCOL 3350 17 G/17G
17 POWDER, FOR SOLUTION ORAL DAILY PRN
Status: DISCONTINUED | OUTPATIENT
Start: 2021-06-25 | End: 2021-06-28 | Stop reason: HOSPADM

## 2021-06-25 RX ORDER — ONDANSETRON 2 MG/ML
4 INJECTION INTRAMUSCULAR; INTRAVENOUS EVERY 4 HOURS PRN
Status: DISCONTINUED | OUTPATIENT
Start: 2021-06-25 | End: 2021-06-28 | Stop reason: HOSPADM

## 2021-06-25 RX ORDER — OXYCODONE HYDROCHLORIDE 5 MG/1
5 TABLET ORAL EVERY 4 HOURS PRN
Status: DISCONTINUED | OUTPATIENT
Start: 2021-06-25 | End: 2021-06-28 | Stop reason: HOSPADM

## 2021-06-25 RX ORDER — HYDROMORPHONE HCL IN WATER/PF 6 MG/30 ML
0.2 PATIENT CONTROLLED ANALGESIA SYRINGE INTRAVENOUS EVERY 4 HOURS PRN
Status: DISCONTINUED | OUTPATIENT
Start: 2021-06-25 | End: 2021-06-28 | Stop reason: HOSPADM

## 2021-06-25 RX ORDER — ACETAMINOPHEN 325 MG/1
975 TABLET ORAL EVERY 8 HOURS SCHEDULED
Status: DISCONTINUED | OUTPATIENT
Start: 2021-06-25 | End: 2021-06-28 | Stop reason: HOSPADM

## 2021-06-25 RX ORDER — HEPARIN SODIUM 5000 [USP'U]/ML
5000 INJECTION, SOLUTION INTRAVENOUS; SUBCUTANEOUS EVERY 8 HOURS SCHEDULED
Status: DISCONTINUED | OUTPATIENT
Start: 2021-06-25 | End: 2021-06-28 | Stop reason: HOSPADM

## 2021-06-25 RX ORDER — CARVEDILOL 25 MG/1
25 TABLET ORAL 2 TIMES DAILY WITH MEALS
Status: DISCONTINUED | OUTPATIENT
Start: 2021-06-26 | End: 2021-06-28 | Stop reason: HOSPADM

## 2021-06-25 RX ORDER — OXYCODONE HYDROCHLORIDE 5 MG/1
2.5 TABLET ORAL EVERY 4 HOURS PRN
Status: DISCONTINUED | OUTPATIENT
Start: 2021-06-25 | End: 2021-06-28 | Stop reason: HOSPADM

## 2021-06-25 RX ORDER — SODIUM CHLORIDE 9 MG/ML
100 INJECTION, SOLUTION INTRAVENOUS CONTINUOUS
Status: DISCONTINUED | OUTPATIENT
Start: 2021-06-25 | End: 2021-06-26

## 2021-06-25 RX ORDER — BUPROPION HYDROCHLORIDE 75 MG/1
75 TABLET ORAL 2 TIMES DAILY
Status: DISCONTINUED | OUTPATIENT
Start: 2021-06-25 | End: 2021-06-28 | Stop reason: HOSPADM

## 2021-06-25 RX ORDER — LORAZEPAM 2 MG/ML
0.5 INJECTION INTRAMUSCULAR
Status: DISCONTINUED | OUTPATIENT
Start: 2021-06-25 | End: 2021-06-28 | Stop reason: HOSPADM

## 2021-06-25 RX ORDER — LAMOTRIGINE 100 MG/1
150 TABLET ORAL
Status: DISCONTINUED | OUTPATIENT
Start: 2021-06-25 | End: 2021-06-28 | Stop reason: HOSPADM

## 2021-06-25 RX ADMIN — HEPARIN SODIUM 5000 UNITS: 5000 INJECTION INTRAVENOUS; SUBCUTANEOUS at 22:41

## 2021-06-25 RX ADMIN — LAMOTRIGINE 150 MG: 100 TABLET ORAL at 22:37

## 2021-06-25 RX ADMIN — SODIUM CHLORIDE, SODIUM LACTATE, POTASSIUM CHLORIDE, AND CALCIUM CHLORIDE 1000 ML: .6; .31; .03; .02 INJECTION, SOLUTION INTRAVENOUS at 19:00

## 2021-06-25 RX ADMIN — BUPROPION HYDROCHLORIDE 75 MG: 75 TABLET, FILM COATED ORAL at 22:42

## 2021-06-25 RX ADMIN — OXYCODONE HYDROCHLORIDE 5 MG: 5 TABLET ORAL at 22:37

## 2021-06-25 RX ADMIN — SODIUM CHLORIDE 100 ML/HR: 0.9 INJECTION, SOLUTION INTRAVENOUS at 22:48

## 2021-06-25 RX ADMIN — LORAZEPAM 0.5 MG: 2 INJECTION INTRAMUSCULAR; INTRAVENOUS at 22:38

## 2021-06-25 RX ADMIN — ACETAMINOPHEN 975 MG: 325 TABLET, FILM COATED ORAL at 22:36

## 2021-06-25 RX ADMIN — CEFTRIAXONE SODIUM 1000 MG: 10 INJECTION, POWDER, FOR SOLUTION INTRAVENOUS at 19:40

## 2021-06-25 NOTE — ED PROVIDER NOTES
History  Chief Complaint   Patient presents with    Multiple Falls     Pt reports fall yesterday and another fall today, reports " I feel it coming on and then I just fall"  Pt c/o right flank pain   pt denies thinners  Pt alert and oriented  79-year-old female presents for evaluation of multiple complaints  Patient states that she has been having hallucinations for the past several days which are intermittent  She also feels like she is having hard time expressing her words  The sings are unchanged without modifying factors  No history of similar symptoms in the past   Patient does report having multiple falls over the past several weeks to months which have been increasing in frequency  She complains of mild right hip pain which is constant, nonradiating, without modifying factors and has been ambulatory since  She denies headache, focal neuro deficits or weakness, other traumatic injuries  History provided by:  Patient and relative      Prior to Admission Medications   Prescriptions Last Dose Informant Patient Reported? Taking?    DULoxetine (CYMBALTA) 60 mg delayed release capsule   Yes No   Sig: Take 60 mg by mouth 2 (two) times a day   LORazepam (ATIVAN) 1 mg tablet   Yes No   Sig: Take 1 mg by mouth daily at bedtime   LamoTRIgine (LAMICTAL PO)   Yes No   Sig: Take 1 tablet by mouth daily at bedtime   buPROPion (WELLBUTRIN) 100 mg tablet   Yes No   Sig: Take 100 mg by mouth 2 (two) times a day   carvedilol (COREG) 25 mg tablet   Yes No   Sig: Take 25 mg by mouth 2 (two) times a day with meals   morphine (MS CONTIN) 15 mg 12 hr tablet   Yes No   Sig: Take 15 mg by mouth 2 (two) times a day   naproxen (NAPROSYN) 500 mg tablet   No No   Sig: Take 1 tablet (500 mg total) by mouth 2 (two) times a day with meals for 5 days      Facility-Administered Medications: None       Past Medical History:   Diagnosis Date    Anxiety     Asthma     Chronic pain     Depression     Enterovirus heart infection Past Surgical History:   Procedure Laterality Date    FIXATION KYPHOPLASTY LUMBAR SPINE      HYSTERECTOMY      REPAIR RECTOCELE         History reviewed  No pertinent family history  I have reviewed and agree with the history as documented  E-Cigarette/Vaping     E-Cigarette/Vaping Substances     Social History     Tobacco Use    Smoking status: Never Smoker    Smokeless tobacco: Never Used   Substance Use Topics    Alcohol use: Yes     Comment: social    Drug use: No       Review of Systems   Constitutional: Negative for activity change, appetite change, fatigue and fever  HENT: Negative for congestion, dental problem, ear pain, rhinorrhea and sore throat  Eyes: Negative for pain and redness  Respiratory: Negative for chest tightness, shortness of breath and wheezing  Cardiovascular: Negative for chest pain and palpitations  Gastrointestinal: Negative for abdominal pain, blood in stool, constipation, diarrhea, nausea and vomiting  Endocrine: Negative for cold intolerance and heat intolerance  Genitourinary: Negative for dysuria, frequency and hematuria  Musculoskeletal: Negative for arthralgias and myalgias  Skin: Negative for color change, pallor and rash  Neurological: Positive for speech difficulty, weakness and light-headedness  Negative for numbness  Hematological: Does not bruise/bleed easily  Psychiatric/Behavioral: Positive for hallucinations  Negative for agitation and suicidal ideas  Physical Exam  Physical Exam  Constitutional:       Appearance: Normal appearance  HENT:      Head: Normocephalic and atraumatic  Right Ear: Tympanic membrane and external ear normal       Left Ear: Tympanic membrane and external ear normal       Nose: Nose normal       Mouth/Throat:      Mouth: Mucous membranes are moist    Eyes:      Extraocular Movements: Extraocular movements intact        Conjunctiva/sclera: Conjunctivae normal       Pupils: Pupils are equal, round, and reactive to light  Comments: No hyphema   Neck:      Comments: nttp over ctl spines    Cardiovascular:      Rate and Rhythm: Normal rate and regular rhythm  Pulses: Normal pulses  Heart sounds: Normal heart sounds  Pulmonary:      Effort: Pulmonary effort is normal       Breath sounds: Normal breath sounds  Chest:      Chest wall: No tenderness  Abdominal:      General: There is no distension  Palpations: There is no mass  Tenderness: There is no abdominal tenderness  There is no right CVA tenderness or left CVA tenderness  Hernia: No hernia is present  Comments: Pelvis stable, R hip ttp   Musculoskeletal:         General: No swelling, tenderness or deformity  Cervical back: Normal range of motion and neck supple  Right lower leg: No edema  Left lower leg: No edema  Skin:     Comments: Contusion l zygoma   Neurological:      General: No focal deficit present  Mental Status: She is alert and oriented to person, place, and time  Comments: Mild dysarthria           Vital Signs  ED Triage Vitals   Temperature Pulse Respirations Blood Pressure SpO2   06/25/21 1618 06/25/21 1618 06/25/21 1618 06/25/21 1618 06/25/21 1618   97 6 °F (36 4 °C) 80 16 108/59 95 %      Temp Source Heart Rate Source Patient Position - Orthostatic VS BP Location FiO2 (%)   06/25/21 1618 06/25/21 1618 06/25/21 1618 06/25/21 1618 --   Oral Monitor Sitting Right arm       Pain Score       06/25/21 1621       9           Vitals:    06/25/21 1618 06/25/21 1800 06/25/21 2000   BP: 108/59 127/62 123/62   Pulse: 80 82 102   Patient Position - Orthostatic VS: Sitting Lying Lying         Visual Acuity  Visual Acuity      Most Recent Value   L Pupil Size (mm)  3   R Pupil Size (mm)  3          ED Medications  Medications   sodium chloride 0 9 % infusion (has no administration in time range)   lactated ringers bolus 1,000 mL (0 mL Intravenous Stopped 6/25/21 2025)   ceftriaxone (ROCEPHIN) 1 g/50 mL in dextrose IVPB (0 mg Intravenous Stopped 6/25/21 2026)       Diagnostic Studies  Results Reviewed     Procedure Component Value Units Date/Time    Urine Microscopic [[id number]]  (Abnormal) Collected: 06/25/21 1856    Lab Status: Final result Specimen: Urine, Clean Catch Updated: 06/25/21 1923     RBC, UA 0-1 /hpf      WBC, UA 10-20 /hpf      Epithelial Cells Occasional /hpf      Bacteria, UA Innumerable /hpf      Ca Oxalate Frances, UA Occasional /hpf     Urine culture [[id number]] Collected: 06/25/21 1856    Lab Status: In process Specimen: Urine, Clean Catch Updated: 06/25/21 1923    Rapid drug screen, urine [[id number]]  (Abnormal) Collected: 06/25/21 1858    Lab Status: Final result Specimen: Urine, Clean Catch Updated: 06/25/21 1921     Amph/Meth UR Negative     Barbiturate Ur Negative     Benzodiazepine Urine Negative     Cocaine Urine Negative     Methadone Urine Negative     Opiate Urine Positive     PCP Ur Negative     THC Urine Positive     Oxycodone Urine Negative    Narrative:      Presumptive report  If requested, specimen will be sent to reference lab for confirmation  FOR MEDICAL PURPOSES ONLY  IF CONFIRMATION NEEDED PLEASE CONTACT THE LAB WITHIN 5 DAYS      Drug Screen Cutoff Levels:  AMPHETAMINE/METHAMPHETAMINES  1000 ng/mL  BARBITURATES     200 ng/mL  BENZODIAZEPINES     200 ng/mL  COCAINE      300 ng/mL  METHADONE      300 ng/mL  OPIATES      300 ng/mL  PHENCYCLIDINE     25 ng/mL  THC       50 ng/mL  OXYCODONE      100 ng/mL    Ammonia [[id number]]  (Abnormal) Collected: 06/25/21 1815    Lab Status: Final result Specimen: Blood from Arm, Left Updated: 06/25/21 1901     Ammonia <10 umol/L     Urine Macroscopic, POC [[id number]]  (Abnormal) Collected: 06/25/21 1856    Lab Status: Final result Specimen: Urine Updated: 06/25/21 1857     Color, UA Yellow     Clarity, UA Clear     pH, UA 5 5     Leukocytes, UA Small     Nitrite, UA Positive     Protein, UA Negative mg/dl      Glucose, UA Negative mg/dl      Ketones, UA Trace mg/dl      Urobilinogen, UA 0 2 E U /dl      Bilirubin, UA Negative     Blood, UA Negative     Specific Gravity, UA 1 020    Narrative:      CLINITEK RESULT    NT-BNP PRO [[id number]]  (Abnormal) Collected: 06/25/21 1717    Lab Status: Final result Specimen: Blood from Arm, Left Updated: 06/25/21 1804     NT-proBNP 2,177 pg/mL     Troponin I [[id number]]  (Normal) Collected: 06/25/21 1717    Lab Status: Final result Specimen: Blood from Arm, Left Updated: 06/25/21 1754     Troponin I <0 02 ng/mL     Comprehensive metabolic panel [[id number]]  (Abnormal) Collected: 06/25/21 1717    Lab Status: Final result Specimen: Blood from Arm, Left Updated: 06/25/21 1752     Sodium 137 mmol/L      Potassium 3 9 mmol/L      Chloride 101 mmol/L      CO2 26 mmol/L      ANION GAP 10 mmol/L      BUN 29 mg/dL      Creatinine 1 91 mg/dL      Glucose 83 mg/dL      Calcium 10 5 mg/dL      Corrected Calcium 11 3 mg/dL      AST 28 U/L      ALT 15 U/L      Alkaline Phosphatase 70 U/L      Total Protein 6 1 g/dL      Albumin 3 0 g/dL      Total Bilirubin 0 54 mg/dL      eGFR 27 ml/min/1 73sq m     Narrative:      [place] guidelines for Chronic Kidney Disease (CKD):     Stage 1 with normal or high GFR (GFR > 90 mL/min/1 73 square meters)    Stage 2 Mild CKD (GFR = 60-89 mL/min/1 73 square meters)    Stage 3A Moderate CKD (GFR = 45-59 mL/min/1 73 square meters)    Stage 3B Moderate CKD (GFR = 30-44 mL/min/1 73 square meters)    Stage 4 Severe CKD (GFR = 15-29 mL/min/1 73 square meters)    Stage 5 End Stage CKD (GFR <15 mL/min/1 73 square meters)  Note: GFR calculation is accurate only with a steady state creatinine    Ethanol [[id number]]  (Normal) Collected: 06/25/21 1717    Lab Status: Final result Specimen: Blood from Arm, Left Updated: 06/25/21 1749     Ethanol Lvl <3 mg/dL     CBC and differential [[id number]]  (Abnormal) Collected: 06/25/21 1717    Lab Status: Final result Specimen: Blood from Arm, Left Updated: 06/25/21 1725     WBC 5 63 Thousand/uL      RBC 3 46 Million/uL      Hemoglobin 10 7 g/dL      Hematocrit 32 3 %      MCV 93 fL      MCH 30 9 pg      MCHC 33 1 g/dL      RDW 12 7 %      MPV 9 6 fL      Platelets 307 Thousands/uL      nRBC 0 /100 WBCs      Neutrophils Relative 72 %      Immat GRANS % 0 %      Lymphocytes Relative 17 %      Monocytes Relative 11 %      Eosinophils Relative 0 %      Basophils Relative 0 %      Neutrophils Absolute 4 04 Thousands/µL      Immature Grans Absolute 0 02 Thousand/uL      Lymphocytes Absolute 0 95 Thousands/µL      Monocytes Absolute 0 60 Thousand/µL      Eosinophils Absolute 0 00 Thousand/µL      Basophils Absolute 0 02 Thousands/µL                  CT head wo contrast   Final Result by Damien De La Torre DO (06/25 1842)      No acute intracranial abnormality  Chronic maxillary sinus opacification  Workstation performed: [id number]         XR chest 1 view portable   ED Interpretation by Lydia Espinosa MD (06/25 1935)   Primary reviewed: no acute abnormality      XR hip/pelv 2-3 vws right   ED Interpretation by Lydia Espinosa MD (06/25 1859)   Primary reviewed; No acute abnormality                 Procedures  Procedures         ED Course  ED Course as of Jun 25 2110 Fri Jun 25, 2021 1856 Creatinine(!): 1 91   1912 Nitrite, UA(!): Positive                                           MDM  Number of Diagnoses or Management Options  Acute urinary tract infection  LAILA (acute kidney injury) ([facility] [address] )  Anemia  Delirium  Injury of head, initial encounter  Multiple falls  Diagnosis management comments: Multiple falls, hallucinations, dysarthria, weakness-will do CT of head, cardiac workup, metabolic workup, septic workup, admit  Right hip pain-will do x-ray rule out fracture/dislocation        Disposition  Final diagnoses:   Delirium   [facility]   Acute urinary tract infection   Multiple falls   Injury of head, initial encounter   Anemia     Time reflects when diagnosis was documented in both MDM as applicable and the Disposition within this note     Time User Action Codes Description Comment    6/25/2021  7:21 PM Roseanne Dorado Add [R41 0] Delirium     6/25/2021  7:21 PM Christina Em Add [N17 9] LAILA (acute kidney injury) ([facility] [address] )     6/25/2021  7:21 PM Roseanne Dorado Add [N39 0] Acute urinary tract infection     6/25/2021  7:21 PM Dewanda Heimlich J Add [R29 6] Multiple falls     6/25/2021  7:21 PM Roseanne Dorado Add [S09 90XA] Injury of head, initial encounter     6/25/2021  7:22 PM Roseanne Dorado Add [D64 9] Anemia       ED Disposition     ED Disposition Condition Date/Time Comment    Admit Stable Fri Jun 25, 2021  7:09 PM Case was discussed with Marciana Bumpers and the patient's admission status was agreed to be Admission Status: inpatient status to the service of Dr Allyssa Alcantar   Follow-up Information    None         Patient's Medications   Discharge Prescriptions    No medications on file     No discharge procedures on file      PDMP Review     None          ED Provider  Electronically Signed by           Kristopher Choudhury MD  06/25/21 6459

## 2021-06-26 PROBLEM — D64.9 ANEMIA: Status: ACTIVE | Noted: 2021-06-26

## 2021-06-26 LAB
ALBUMIN SERPL BCP-MCNC: 2.6 G/DL (ref 3.5–5)
ALP SERPL-CCNC: 66 U/L (ref 46–116)
ALT SERPL W P-5'-P-CCNC: 13 U/L (ref 12–78)
ANION GAP SERPL CALCULATED.3IONS-SCNC: 5 MMOL/L (ref 4–13)
AST SERPL W P-5'-P-CCNC: 17 U/L (ref 5–45)
BILIRUB SERPL-MCNC: 0.33 MG/DL (ref 0.2–1)
BUN SERPL-MCNC: 21 MG/DL (ref 5–25)
CALCIUM ALBUM COR SERPL-MCNC: 10.6 MG/DL (ref 8.3–10.1)
CALCIUM SERPL-MCNC: 9.5 MG/DL (ref 8.3–10.1)
CHLORIDE SERPL-SCNC: 108 MMOL/L (ref 100–108)
CO2 SERPL-SCNC: 28 MMOL/L (ref 21–32)
CREAT SERPL-MCNC: 1.45 MG/DL (ref 0.6–1.3)
ERYTHROCYTE [DISTWIDTH] IN BLOOD BY AUTOMATED COUNT: 12.8 % (ref 11.6–15.1)
FERRITIN SERPL-MCNC: 98 NG/ML (ref 8–388)
FOLATE SERPL-MCNC: 9.2 NG/ML (ref 3.1–17.5)
GFR SERPL CREATININE-BSD FRML MDRD: 37 ML/MIN/1.73SQ M
GLUCOSE SERPL-MCNC: 80 MG/DL (ref 65–140)
HCT VFR BLD AUTO: 29.6 % (ref 34.8–46.1)
HGB BLD-MCNC: 9.8 G/DL (ref 11.5–15.4)
IRON SATN MFR SERPL: 29 %
IRON SERPL-MCNC: 77 UG/DL (ref 50–170)
MCH RBC QN AUTO: 30.9 PG (ref 26.8–34.3)
MCHC RBC AUTO-ENTMCNC: 33.1 G/DL (ref 31.4–37.4)
MCV RBC AUTO: 93 FL (ref 82–98)
PLATELET # BLD AUTO: 127 THOUSANDS/UL (ref 149–390)
PMV BLD AUTO: 9.8 FL (ref 8.9–12.7)
POTASSIUM SERPL-SCNC: 3.8 MMOL/L (ref 3.5–5.3)
PROT SERPL-MCNC: 5.3 G/DL (ref 6.4–8.2)
RBC # BLD AUTO: 3.17 MILLION/UL (ref 3.81–5.12)
SODIUM SERPL-SCNC: 141 MMOL/L (ref 136–145)
TIBC SERPL-MCNC: 267 UG/DL (ref 250–450)
VIT B12 SERPL-MCNC: 443 PG/ML (ref 100–900)
WBC # BLD AUTO: 3.55 THOUSAND/UL (ref 4.31–10.16)

## 2021-06-26 PROCEDURE — 80053 COMPREHEN METABOLIC PANEL: CPT | Performed by: INTERNAL MEDICINE

## 2021-06-26 PROCEDURE — 85027 COMPLETE CBC AUTOMATED: CPT | Performed by: INTERNAL MEDICINE

## 2021-06-26 PROCEDURE — 99223 1ST HOSP IP/OBS HIGH 75: CPT | Performed by: INTERNAL MEDICINE

## 2021-06-26 RX ORDER — BUDESONIDE AND FORMOTEROL FUMARATE DIHYDRATE 80; 4.5 UG/1; UG/1
2 AEROSOL RESPIRATORY (INHALATION) 2 TIMES DAILY
Status: DISCONTINUED | OUTPATIENT
Start: 2021-06-26 | End: 2021-06-28 | Stop reason: HOSPADM

## 2021-06-26 RX ORDER — SODIUM CHLORIDE 9 MG/ML
100 INJECTION, SOLUTION INTRAVENOUS CONTINUOUS
Status: DISCONTINUED | OUTPATIENT
Start: 2021-06-26 | End: 2021-06-27

## 2021-06-26 RX ORDER — HYDROXYCHLOROQUINE SULFATE 200 MG/1
200 TABLET, FILM COATED ORAL 2 TIMES DAILY
Status: DISCONTINUED | OUTPATIENT
Start: 2021-06-26 | End: 2021-06-28 | Stop reason: HOSPADM

## 2021-06-26 RX ORDER — OMEGA-3S/DHA/EPA/FISH OIL/D3 300MG-1000
400 CAPSULE ORAL DAILY
Status: DISCONTINUED | OUTPATIENT
Start: 2021-06-26 | End: 2021-06-28 | Stop reason: HOSPADM

## 2021-06-26 RX ORDER — SODIUM CHLORIDE 9 MG/ML
100 INJECTION, SOLUTION INTRAVENOUS CONTINUOUS
Status: DISPENSED | OUTPATIENT
Start: 2021-06-26 | End: 2021-06-26

## 2021-06-26 RX ORDER — CALCIUM CARBONATE 200(500)MG
500 TABLET,CHEWABLE ORAL 3 TIMES DAILY PRN
Status: DISCONTINUED | OUTPATIENT
Start: 2021-06-26 | End: 2021-06-28 | Stop reason: HOSPADM

## 2021-06-26 RX ORDER — PANTOPRAZOLE SODIUM 40 MG/1
40 TABLET, DELAYED RELEASE ORAL
Status: DISCONTINUED | OUTPATIENT
Start: 2021-06-26 | End: 2021-06-28 | Stop reason: HOSPADM

## 2021-06-26 RX ORDER — GABAPENTIN 300 MG/1
300 CAPSULE ORAL
Status: DISCONTINUED | OUTPATIENT
Start: 2021-06-26 | End: 2021-06-28

## 2021-06-26 RX ADMIN — LAMOTRIGINE 150 MG: 100 TABLET ORAL at 21:16

## 2021-06-26 RX ADMIN — SODIUM CHLORIDE 100 ML/HR: 0.9 INJECTION, SOLUTION INTRAVENOUS at 09:00

## 2021-06-26 RX ADMIN — SODIUM CHLORIDE 100 ML/HR: 0.9 INJECTION, SOLUTION INTRAVENOUS at 01:41

## 2021-06-26 RX ADMIN — BUPROPION HYDROCHLORIDE 75 MG: 75 TABLET, FILM COATED ORAL at 09:03

## 2021-06-26 RX ADMIN — HEPARIN SODIUM 5000 UNITS: 5000 INJECTION INTRAVENOUS; SUBCUTANEOUS at 13:13

## 2021-06-26 RX ADMIN — ACETAMINOPHEN 975 MG: 325 TABLET, FILM COATED ORAL at 21:16

## 2021-06-26 RX ADMIN — CARVEDILOL 25 MG: 25 TABLET, FILM COATED ORAL at 17:10

## 2021-06-26 RX ADMIN — HYDROXYCHLOROQUINE SULFATE 200 MG: 200 TABLET, FILM COATED ORAL at 09:04

## 2021-06-26 RX ADMIN — ACETAMINOPHEN 975 MG: 325 TABLET, FILM COATED ORAL at 05:13

## 2021-06-26 RX ADMIN — HEPARIN SODIUM 5000 UNITS: 5000 INJECTION INTRAVENOUS; SUBCUTANEOUS at 05:13

## 2021-06-26 RX ADMIN — POLYETHYLENE GLYCOL 3350 17 G: 17 POWDER, FOR SOLUTION ORAL at 15:26

## 2021-06-26 RX ADMIN — BUPROPION HYDROCHLORIDE 75 MG: 75 TABLET, FILM COATED ORAL at 17:10

## 2021-06-26 RX ADMIN — CHOLECALCIFEROL TAB 10 MCG (400 UNIT) 400 UNITS: 10 TAB at 09:03

## 2021-06-26 RX ADMIN — GABAPENTIN 300 MG: 300 CAPSULE ORAL at 21:16

## 2021-06-26 RX ADMIN — BUDESONIDE AND FORMOTEROL FUMARATE DIHYDRATE 2 PUFF: 80; 4.5 AEROSOL RESPIRATORY (INHALATION) at 09:04

## 2021-06-26 RX ADMIN — LORAZEPAM 0.5 MG: 2 INJECTION INTRAMUSCULAR; INTRAVENOUS at 21:16

## 2021-06-26 RX ADMIN — CARVEDILOL 25 MG: 25 TABLET, FILM COATED ORAL at 09:03

## 2021-06-26 RX ADMIN — PANTOPRAZOLE SODIUM 40 MG: 40 TABLET, DELAYED RELEASE ORAL at 05:14

## 2021-06-26 RX ADMIN — OXYCODONE HYDROCHLORIDE 5 MG: 5 TABLET ORAL at 22:14

## 2021-06-26 RX ADMIN — ACETAMINOPHEN 975 MG: 325 TABLET, FILM COATED ORAL at 13:13

## 2021-06-26 RX ADMIN — HEPARIN SODIUM 5000 UNITS: 5000 INJECTION INTRAVENOUS; SUBCUTANEOUS at 21:16

## 2021-06-26 RX ADMIN — CEFTRIAXONE SODIUM 1000 MG: 10 INJECTION, POWDER, FOR SOLUTION INTRAVENOUS at 19:37

## 2021-06-26 RX ADMIN — CALCIUM CARBONATE (ANTACID) CHEW TAB 500 MG 500 MG: 500 CHEW TAB at 22:14

## 2021-06-26 RX ADMIN — BUDESONIDE AND FORMOTEROL FUMARATE DIHYDRATE 2 PUFF: 80; 4.5 AEROSOL RESPIRATORY (INHALATION) at 17:10

## 2021-06-26 RX ADMIN — OXYCODONE HYDROCHLORIDE 5 MG: 5 TABLET ORAL at 17:12

## 2021-06-26 RX ADMIN — HYDROXYCHLOROQUINE SULFATE 200 MG: 200 TABLET, FILM COATED ORAL at 17:10

## 2021-06-26 NOTE — ASSESSMENT & PLAN NOTE
· Of the spine with frequent epidurals, sees pain management as OP  · According to chart review her pain management routine is  As follows:  · Morphine ER 15 mg b i d   · cymbalta 60 mg b i d  · Brother reports that patient has been slow to get to PT as an OP  · PT/OT/CM placement ?

## 2021-06-26 NOTE — ASSESSMENT & PLAN NOTE
· Hgb 10 on admission, normocitic   According to chart review her baseline is 10-11  · Iron panel , vit b12 & folate are WNL  · Fecal occult blood is ordered  · Transfuse < 7 hgb

## 2021-06-26 NOTE — ASSESSMENT & PLAN NOTE
· Previous cr according to chart review is 0 9 in May 2021  · Cr on admission is 1 91--> trended down, most recent 0 97  · Suspect 2/2 uti, pre-renal azotemia  · UA with evidence of acute UTI  · Urinary retention protocol  · I/O  · Hold morphine from home, hold nephrotoxins, NSAIDs, avoid hypotension  · Minipress initially held, will restart as the patient's creatinine has improved

## 2021-06-26 NOTE — ASSESSMENT & PLAN NOTE
· POA, presented with weakness and AMS  · Found to have evidence of UTI on UA with positive nitrites, innumerable bacteria and moderate leukocytes  · IV rocephin for now, f/u cultures and sensitivities  · S/p 1L LR , and 100ml/h for 12 hours overnight  · Urine culture is pending  · No leukocytosis, afebrile  · With LAILA on admission, 1 91 initially, trended down to 0 97 most recently

## 2021-06-26 NOTE — ASSESSMENT & PLAN NOTE
· Hgb 10 on admission, normocitic   According to chart review her baseline is 10-11  · Iron panel , vit b12 & folate are pending  · Fecal occult blood is ordered  · Transfuse < 7 hgb

## 2021-06-26 NOTE — ASSESSMENT & PLAN NOTE
· Previous cr according to chart review is 0 9 in May 2021  · Cr on admission is 1 91  · Suspect 2/2 uti, pre-renal azotemia  · UA with evidence of acute UTI  · Urinary retention protocol  · I/O  · Hold morphine from home, hold nephrotoxins, NSAIDs, avoid hypotension  · Did renally dose Lovenox for dvt ppx  Holding mini press at this time   Monitor BP while on coreg alone

## 2021-06-26 NOTE — PLAN OF CARE
Problem: Potential for Falls  Goal: Patient will remain free of falls  Description: INTERVENTIONS:  - Educate patient/family on patient safety including physical limitations  - Instruct patient to call for assistance with activity   - Consult OT/PT to assist with strengthening/mobility   - Keep Call bell within reach  - Keep bed low and locked with side rails adjusted as appropriate  - Keep care items and personal belongings within reach  - Initiate and maintain comfort rounds  - Make Fall Risk Sign visible to staff  - Offer Toileting every 4 Hours, in advance of need  - Initiate/Maintain bed alarm  - Obtain necessary fall risk management equipment: bed alarm  - Apply yellow socks and bracelet for high fall risk patients  - Consider moving patient to room near nurses station  Outcome: Progressing

## 2021-06-26 NOTE — ASSESSMENT & PLAN NOTE
· Brother reports longstanding h/o falls, increasing over the last 2 months  · Last fall was yesterday  CT head without acute findings   C/o right hip pain, no acute fracture   · Would consider work up for chronic ischemic changes with MRI brain if mentation does not improve with treatment of UTI  · EKG NSR with PVCs and nonspecific T wave changes  · Orthostatics   · Geriatric consult  · PT/OT evaluation, appreciate recommendations

## 2021-06-26 NOTE — ASSESSMENT & PLAN NOTE
· Brother reports worsening over the last 2 weeks as he can tell via their phone conversations  · Patient mumbling, babbling, trailing off in her sentences, disoriented  · Suspect this si 2/2 poor p o  intake, dehydration, LAILA & UTI  · Takes many narcotic meds at home, manages them on her own  Unable to get an accurate idea of how patient actually takes her medications  Jesika Luo are rx by pain management for chronic back pain  · Also medical marijuana  Opiates in the urine  · Will hold morphine for now in LAILA as well as decreasing home dose of wellbutrin from 450 ?? mg qd  to 75 mg b i d  Will restart wellbutrin full home dose once LAILA is resolved  · Also did hold home Ativan at this time 2/2 AMS, no Benzo in the urine     · Geriatrics has been consulted  · Geriatric pain protocol for now  · H/o vit D deficiency , will supplement daily  · TSH recently was normal  · Ammonia is nl

## 2021-06-26 NOTE — ASSESSMENT & PLAN NOTE
· Brother reports longstanding h/o falls, increasing over the last 2 months  · Last fall was yesterday  CT head without acute findings  C/o right hip pain, no acute fracture on XR per my read  · Would consider work up for chronic ischemic changes with MRI brain if mentation does not improve with treatment of UTI  · EKG NSR with PVCs and nonspecific T wave changes  · Patient is in charge of her own medications which include narcotics, p o  intake is up to the patient, her brother reports mostly tv dinners, suspect poor p o  intake and dehydration ?   · Orthostatics please  · Geriatric consult

## 2021-06-26 NOTE — ASSESSMENT & PLAN NOTE
· Sees urogyn as OP  · Per my chart review does take Oxbutynin 5 mg daily for urinary incontinence  · Will continue to hold this for the time being since patient has uti and AMS  · I/O

## 2021-06-26 NOTE — ASSESSMENT & PLAN NOTE
· Brother reports worsening over the last 2 weeks as he can tell via their phone conversations  · On admission, the patient was mumbling, babbling, trailing off in her sentences, disoriented  · Alert oriented x3 today, thought content normal on my exam  · Suspect multifactorial in the setting of poor p o  intake, dehydration, LAILA & UTI  · Takes many narcotic meds at home, manages them on her own  Unable to get an accurate idea of how patient actually takes her medications     · Managed by outpatient pain management  · Also medical marijuana-urine positive for opiates and THC  · Morphine initially held in the setting of LAILA, geriatric pain protocol  · Restart morphine as an outpatient  · Geriatrics has been consulted, appreciate recommendations  · H/o vit D deficiency , will supplement daily  · TSH recently was normal  · Ammonia is nl

## 2021-06-26 NOTE — ASSESSMENT & PLAN NOTE
· Sees urogyn as OP  · Per my chart review does take Oxbutynin 5 mg daily for urinary incontinence  · Will hold this for the time being since patient has uti and LAILA, AMS  · I/O

## 2021-06-26 NOTE — ASSESSMENT & PLAN NOTE
Wt Readings from Last 3 Encounters:   01/20/20 63 7 kg (140 lb 6 9 oz)   09/22/18 60 9 kg (134 lb 3 2 oz)   11/17/16 64 9 kg (143 lb)       · NYHA 2 according to my chart review, did have EF 10 % back in 2004 when she first presented with non-ischemic cardiomyopathy  · Follows with cardiology as OP and on chart review they reported that the EF returned back to normal with coreg  · Continue coreg  · Pro bnp is elevated today 2,000 however patient seems very dry on exam  · CXR without acute pulmonary edema or pleural effusion, some venous congestion and cephalization of vessels  · Monitor fluid status since patient is receiving IVF overnight tonight

## 2021-06-26 NOTE — ASSESSMENT & PLAN NOTE
Wt Readings from Last 3 Encounters:   01/20/20 63 7 kg (140 lb 6 9 oz)   09/22/18 60 9 kg (134 lb 3 2 oz)   11/17/16 64 9 kg (143 lb)       · NYHA 2 according to my chart review, did have EF 10 % back in 2004 when she first presented with non-ischemic cardiomyopathy  · Follows with cardiology as OP and on chart review they reported that the EF returned back to normal with coreg  · Continue coreg  · Pro bnp is elevated today 2,000 however patient seems very dry on exam  · CXR without acute pulmonary edema or pleural effusion, some venous congestion and cephalization of vessels  · Monitor fluid status   · I's and O's  · Daily weights

## 2021-06-26 NOTE — H&P
[address] [facility]  H&P- Nevaeh Stewart 1955, 78 y o  female MRN: [id number]  Unit/Bed#: E5 -01 Encounter: [id number]  Primary Care Provider: Gray Garcia DO   Date and time admitted to hospital: 6/25/2021  4:23 PM    * Acute cystitis without hematuria  Assessment & Plan  · Patient with weakness and AMS  · Found to have evidence of UTI on UA , rocephin for now  · S/p 1L LR , now with fluids running 100ml/h for 12 hours overnight  · Urine culture is pending  · No leukocytosis, afebrile    LAILA (acute kidney injury) ([facility] [address] )  Assessment & Plan  · Previous cr according to chart review is 0 9 in May 2021  · Cr on admission is 1 91  · Suspect 2/2 uti, pre-renal azotemia  · UA with evidence of acute UTI  · Urinary retention protocol  · I/O  · Hold morphine from home, hold nephrotoxins, NSAIDs, avoid hypotension  ·  Holding mini press at this time  Monitor BP while on coreg alone    Acute metabolic encephalopathy  Assessment & Plan  · Brother reports worsening over the last 2 weeks as he can tell via their phone conversations  · Patient mumbling, babbling, trailing off in her sentences, disoriented  · Suspect this si 2/2 poor p o  intake, dehydration, LAILA & UTI  · Takes many narcotic meds at home, manages them on her own  Unable to get an accurate idea of how patient actually takes her medications  Marilia Mclean are rx by pain management for chronic back pain  · Also medical marijuana  Opiates in the urine  · Will hold morphine for now in LAILA as well as decreasing home dose of wellbutrin from 450 ?? mg qd  to 75 mg b i d  Will restart wellbutrin full home dose once LAILA is resolved  · Also did hold home Ativan at this time 2/2 AMS, no Benzo in the urine     · Geriatrics has been consulted  · Geriatric pain protocol for now  · H/o vit D deficiency , will supplement daily  · TSH recently was normal  · Ammonia is nl    Ambulatory dysfunction  Assessment & Plan  · Brother reports longstanding h/o falls, increasing over the last 2 months  · Last fall was yesterday  CT head without acute findings  C/o right hip pain, no acute fracture on XR per my read  · Would consider work up for chronic ischemic changes with MRI brain if mentation does not improve with treatment of UTI  · EKG NSR with PVCs and nonspecific T wave changes  · Patient is in charge of her own medications which include narcotics, p o  intake is up to the patient, her brother reports mostly tv dinners, suspect poor p o  intake and dehydration ? · Orthostatics please  · Geriatric consult    Anemia  Assessment & Plan  · Hgb 10 on admission, normocitic   According to chart review her baseline is 10-11  · Iron panel , vit b12 & folate are pending  · Fecal occult blood is ordered  · Transfuse < 7 hgb    CHF (congestive heart failure) ([facility])  Assessment & Plan  Wt Readings from Last 3 Encounters:   01/20/20 63 7 kg (140 lb 6 9 oz)   09/22/18 60 9 kg (134 lb 3 2 oz)   11/17/16 64 9 kg (143 lb)       · NYHA 2 according to my chart review, did have EF 10 % back in 2004 when she first presented with non-ischemic cardiomyopathy  · Follows with cardiology as OP and on chart review they reported that the EF returned back to normal with coreg  · Continue coreg  · Pro bnp is elevated today 2,000 however patient seems very dry on exam  · CXR without acute pulmonary edema or pleural effusion, some venous congestion and cephalization of vessels  · Monitor fluid status since patient is receiving IVF overnight tonight      Mixed stress and urge urinary incontinence  Assessment & Plan  · Sees urogyn as OP  · Per my chart review does take Oxbutynin 5 mg daily for urinary incontinence  · Will hold this for the time being since patient has uti and LAILA, AMS  · I/O    Gastroesophageal reflux disease without esophagitis  Assessment & Plan  · Continue protonix 40 mg daily per chart review    Rheumatoid arthritis ([facility] [address] )  Assessment & Plan  · Continue plaquenil per chart review 200 mg b i d    Asthma  Assessment & Plan  · Not acutely exacerbated  · Continue substituting symbicort for advair   · Albuterol p r n  Chronic pain  Assessment & Plan  · Of the spine with frequent epidurals, sees pain management as OP  · According to chart review her pain management routine is  As follows:  · Morphine ER 15 mg b i d   · cymbalta 60 mg b i d  · Brother reports that patient has been slow to get to PT as an OP  · PT/OT/CM placement ? VTE Pharmacologic Prophylaxis: VTE Score: 3 Moderate Risk (Score 3-4) - Pharmacological DVT Prophylaxis Ordered: enoxaparin (Lovenox)  Code Status: Level 1 - Full Code   Discussion with family: Updated  (brother) at bedside  Anticipated Length of Stay: Patient will be admitted on an inpatient basis with an anticipated length of stay of greater than 2 midnights secondary to iv antibiotics, ams  Total Time for Visit, including Counseling / Coordination of Care: 45 minutes Greater than 50% of this total time spent on direct patient counseling and coordination of care  Chief Complaint: altered mental status x 2 weeks    History of Present Illness:  Jian Hussein is a 78 y o  female with a PMH of RA, Cardiomyopathy with CHF, asthma, chronic pain with longstanding opiate use, urinary incontience, GERD who presents with altered mental status  Brother reports patient was having worsening mentation over the last 2 weeks he could tell from his phone calls with her that her speech was slurred, she was trailing off in her conversations, mumbling, babbling and when he finally came to visit her at her home that she shares with her son, she was hallucinating, bruised from a recent fall, very weak and twitching  Patient normally goes to [facility] but brother prefers [facility] so she is here today  Patient was evaluated after her most recent fall at [facility]  Brother reports she has had multiple falls over the last 2 years without any fractures   Patient latest fall was yesterday  Unwitnessed by brother, patient cannot provide details of any head strike, LOC, other injury  Complaining of right hip pain, worse with movement and flexion of the right hip as well as extension of the right knee  Patient has chronic back pain with history of kyphoplasty that is also exacerbated by movement of the lower extremities  Patient admits to being incontinent of urine and having to wear a pad for the last 2 weeks because of this  Cannot provide history about other urinary symptoms  Patient's brother reports that patient lives in a home with her son who works all day and is "hopeless"  Patient's brother does not help patient make medical decisions, she makes them on her own  Patient's brother reports that patient takes her own medications at home, she doesn't have a pill organizer and she takes one or 2 of any medications as she pleases according to report from the son  Review of Systems: Limited 2/2 AMS  Review of Systems   Constitutional: Positive for activity change  Negative for appetite change, chills, diaphoresis, fatigue and fever  HENT: Negative  Eyes: Negative  Respiratory: Negative  Cardiovascular: Negative  Gastrointestinal: Negative  Genitourinary:        Urinary incontinence   Musculoskeletal: Positive for arthralgias  Right hip pain located in the right groin   Neurological: Positive for tremors and weakness  Psychiatric/Behavioral: Positive for behavioral problems, confusion and hallucinations  All other systems reviewed and are negative  Past Medical and Surgical History:   Past Medical History:   Diagnosis Date    Anxiety     Asthma     Chronic pain     Depression     Enterovirus heart infection        Past Surgical History:   Procedure Laterality Date    FIXATION KYPHOPLASTY LUMBAR SPINE      HYSTERECTOMY      REPAIR RECTOCELE         Meds/Allergies:  Prior to Admission medications    Medication Sig Start Date End Date Taking? Authorizing Provider   buPROPion (WELLBUTRIN) 100 mg tablet Take 100 mg by mouth 2 (two) times a day    Historical Provider, MD   carvedilol (COREG) 25 mg tablet Take 25 mg by mouth 2 (two) times a day with meals    Historical Provider, MD   DULoxetine (CYMBALTA) 60 mg delayed release capsule Take 60 mg by mouth 2 (two) times a day    Historical Provider, MD   LamoTRIgine (LAMICTAL PO) Take 1 tablet by mouth daily at bedtime    Historical Provider, MD   LORazepam (ATIVAN) 1 mg tablet Take 1 mg by mouth daily at bedtime    Historical Provider, MD   morphine (MS CONTIN) 15 mg 12 hr tablet Take 15 mg by mouth 2 (two) times a day    Historical Provider, MD   naproxen (NAPROSYN) 500 mg tablet Take 1 tablet (500 mg total) by mouth 2 (two) times a day with meals for 5 days 9/22/18 9/27/18  Riaz Del Rosario,      I have reviewed home medications using recent Epic encounter  Allergies: Allergies   Allergen Reactions    Penicillins        Social History:  Marital Status:    Occupation: retired  Patient Pre-hospital Living Situation: Home  Patient Pre-hospital Level of Mobility: walks with walker  Patient Pre-hospital Diet Restrictions: none  Substance Use History:   Social History     Substance and Sexual Activity   Alcohol Use Yes    Comment: social     Social History     Tobacco Use   Smoking Status Never Smoker   Smokeless Tobacco Never Used     Social History     Substance and Sexual Activity   Drug Use No       Family History:  History reviewed  No pertinent family history  Physical Exam:     Vitals:   Blood Pressure: 117/71 (06/26/21 0304)  Pulse: 77 (06/26/21 0304)  Temperature: 97 6 °F (36 4 °C) (06/26/21 0304)  Temp Source: Oral (06/25/21 1618)  Respirations: 16 (06/25/21 2201)  SpO2: 95 % (06/26/21 0304)    Physical Exam  Vitals reviewed  Constitutional:       General: She is in acute distress  Appearance: She is ill-appearing and diaphoretic        Comments: Patient has twitches on the left fingers and toes, she lies mostly still while I speak with her brother, she is not fidgeting  She responds to my prompting, GCS 15   HENT:      Head: Normocephalic and atraumatic  Nose: Nose normal       Mouth/Throat:      Mouth: Mucous membranes are dry  Eyes:      Extraocular Movements: Extraocular movements intact  Conjunctiva/sclera: Conjunctivae normal       Pupils: Pupils are equal, round, and reactive to light  Cardiovascular:      Rate and Rhythm: Regular rhythm  Pulses: Normal pulses  Heart sounds: Murmur heard  Pulmonary:      Effort: Pulmonary effort is normal       Breath sounds: Normal breath sounds  Abdominal:      General: Bowel sounds are normal       Palpations: Abdomen is soft  Musculoskeletal:         General: Tenderness present  No swelling  Cervical back: Neck supple  Comments: Right hip   Skin:     General: Skin is warm  Capillary Refill: Capillary refill takes less than 2 seconds  Coloration: Skin is not jaundiced or pale  Findings: Bruising and lesion present  No erythema  Comments: Black eye which is purple and green on the left eye, abrasion   Neurological:      General: No focal deficit present  Mental Status: She is disoriented and confused  GCS: GCS eye subscore is 4  GCS verbal subscore is 5  GCS motor subscore is 6  Cranial Nerves: No cranial nerve deficit  Sensory: Sensation is intact  Motor: Weakness present  No abnormal muscle tone, seizure activity or pronator drift  Coordination: Finger-Nose-Finger Test normal       Comments: Oriented to self, location as [facility], year is 2021  Doesn't know day or month  Requires frequent reorientation, rewording of questions    Generally weak but upper muscle groups equal strength 3/5, lower muscle groups equal strength 3/5   Psychiatric:         Attention and Perception: She is inattentive           Speech: Speech is slurred and tangential  Behavior: Behavior is withdrawn  Comments: Not actively hallucinating at this time  Additional Data:     Lab Results:  Results from last 7 days   Lab Units 06/26/21  0346 06/25/21  1717   WBC Thousand/uL 3 55* 5 63   HEMOGLOBIN g/dL 9 8* 10 7*   HEMATOCRIT % 29 6* 32 3*   PLATELETS Thousands/uL 127* 164   NEUTROS PCT %  --  72   LYMPHS PCT %  --  17   MONOS PCT %  --  11   EOS PCT %  --  0     Results from last 7 days   Lab Units 06/26/21  0346   SODIUM mmol/L 141   POTASSIUM mmol/L 3 8   CHLORIDE mmol/L 108   CO2 mmol/L 28   BUN mg/dL 21   CREATININE mg/dL 1 45*   ANION GAP mmol/L 5   CALCIUM mg/dL 9 5   ALBUMIN g/dL 2 6*   TOTAL BILIRUBIN mg/dL 0 33   ALK PHOS U/L 66   ALT U/L 13   AST U/L 17   GLUCOSE RANDOM mg/dL 80                       Imaging: Reviewed radiology reports from this admission including: CT head and Personally reviewed the following imaging: chest xray and xray(s)  CT head wo contrast   Final Result by Damien De La Torre DO (06/25 1842)      No acute intracranial abnormality  Chronic maxillary sinus opacification  Workstation performed: [id number]         XR chest 1 view portable   ED Interpretation by Robi Bill MD (06/25 1935)   Primary reviewed: no acute abnormality      XR hip/pelv 2-3 vws right   ED Interpretation by Robi Bill MD (06/25 1859)   Primary reviewed; No acute abnormality          EKG and Other Studies Reviewed on Admission:   · EKG: No EKG obtained  ** Please Note: This note has been constructed using a voice recognition system   **

## 2021-06-27 LAB
ANION GAP SERPL CALCULATED.3IONS-SCNC: 9 MMOL/L (ref 4–13)
BACTERIA UR CULT: ABNORMAL
BACTERIA UR CULT: ABNORMAL
BASOPHILS # BLD AUTO: 0.02 THOUSANDS/ΜL (ref 0–0.1)
BASOPHILS NFR BLD AUTO: 1 % (ref 0–1)
BUN SERPL-MCNC: 12 MG/DL (ref 5–25)
CALCIUM SERPL-MCNC: 9.4 MG/DL (ref 8.3–10.1)
CHLORIDE SERPL-SCNC: 110 MMOL/L (ref 100–108)
CO2 SERPL-SCNC: 23 MMOL/L (ref 21–32)
CREAT SERPL-MCNC: 0.97 MG/DL (ref 0.6–1.3)
EOSINOPHIL # BLD AUTO: 0 THOUSAND/ΜL (ref 0–0.61)
EOSINOPHIL NFR BLD AUTO: 0 % (ref 0–6)
ERYTHROCYTE [DISTWIDTH] IN BLOOD BY AUTOMATED COUNT: 12.9 % (ref 11.6–15.1)
GFR SERPL CREATININE-BSD FRML MDRD: 61 ML/MIN/1.73SQ M
GLUCOSE SERPL-MCNC: 70 MG/DL (ref 65–140)
HCT VFR BLD AUTO: 30.9 % (ref 34.8–46.1)
HGB BLD-MCNC: 10 G/DL (ref 11.5–15.4)
IMM GRANULOCYTES # BLD AUTO: 0 THOUSAND/UL (ref 0–0.2)
IMM GRANULOCYTES NFR BLD AUTO: 0 % (ref 0–2)
LYMPHOCYTES # BLD AUTO: 1.03 THOUSANDS/ΜL (ref 0.6–4.47)
LYMPHOCYTES NFR BLD AUTO: 30 % (ref 14–44)
MCH RBC QN AUTO: 30 PG (ref 26.8–34.3)
MCHC RBC AUTO-ENTMCNC: 32.4 G/DL (ref 31.4–37.4)
MCV RBC AUTO: 93 FL (ref 82–98)
MONOCYTES # BLD AUTO: 0.36 THOUSAND/ΜL (ref 0.17–1.22)
MONOCYTES NFR BLD AUTO: 11 % (ref 4–12)
NEUTROPHILS # BLD AUTO: 2 THOUSANDS/ΜL (ref 1.85–7.62)
NEUTS SEG NFR BLD AUTO: 58 % (ref 43–75)
NRBC BLD AUTO-RTO: 0 /100 WBCS
PLATELET # BLD AUTO: 144 THOUSANDS/UL (ref 149–390)
PMV BLD AUTO: 10 FL (ref 8.9–12.7)
POTASSIUM SERPL-SCNC: 3.5 MMOL/L (ref 3.5–5.3)
RBC # BLD AUTO: 3.33 MILLION/UL (ref 3.81–5.12)
SODIUM SERPL-SCNC: 142 MMOL/L (ref 136–145)
WBC # BLD AUTO: 3.41 THOUSAND/UL (ref 4.31–10.16)

## 2021-06-27 PROCEDURE — 97167 OT EVAL HIGH COMPLEX 60 MIN: CPT

## 2021-06-27 PROCEDURE — 99232 SBSQ HOSP IP/OBS MODERATE 35: CPT | Performed by: PHYSICIAN ASSISTANT

## 2021-06-27 PROCEDURE — 85025 COMPLETE CBC W/AUTO DIFF WBC: CPT | Performed by: INTERNAL MEDICINE

## 2021-06-27 PROCEDURE — 80048 BASIC METABOLIC PNL TOTAL CA: CPT | Performed by: INTERNAL MEDICINE

## 2021-06-27 PROCEDURE — 97163 PT EVAL HIGH COMPLEX 45 MIN: CPT

## 2021-06-27 RX ORDER — GINSENG 100 MG
1 CAPSULE ORAL 2 TIMES DAILY
Status: DISCONTINUED | OUTPATIENT
Start: 2021-06-27 | End: 2021-06-28 | Stop reason: HOSPADM

## 2021-06-27 RX ORDER — ECHINACEA PURPUREA EXTRACT 125 MG
1 TABLET ORAL
Status: DISCONTINUED | OUTPATIENT
Start: 2021-06-27 | End: 2021-06-28 | Stop reason: HOSPADM

## 2021-06-27 RX ORDER — SENNOSIDES 8.6 MG
1 TABLET ORAL
Status: DISCONTINUED | OUTPATIENT
Start: 2021-06-27 | End: 2021-06-28 | Stop reason: HOSPADM

## 2021-06-27 RX ADMIN — ONDANSETRON 4 MG: 2 INJECTION INTRAMUSCULAR; INTRAVENOUS at 11:11

## 2021-06-27 RX ADMIN — HYDROXYCHLOROQUINE SULFATE 200 MG: 200 TABLET, FILM COATED ORAL at 17:34

## 2021-06-27 RX ADMIN — LAMOTRIGINE 150 MG: 100 TABLET ORAL at 21:07

## 2021-06-27 RX ADMIN — BUDESONIDE AND FORMOTEROL FUMARATE DIHYDRATE 2 PUFF: 80; 4.5 AEROSOL RESPIRATORY (INHALATION) at 08:18

## 2021-06-27 RX ADMIN — BUDESONIDE AND FORMOTEROL FUMARATE DIHYDRATE 2 PUFF: 80; 4.5 AEROSOL RESPIRATORY (INHALATION) at 17:34

## 2021-06-27 RX ADMIN — LORAZEPAM 0.5 MG: 2 INJECTION INTRAMUSCULAR; INTRAVENOUS at 21:08

## 2021-06-27 RX ADMIN — CHOLECALCIFEROL TAB 10 MCG (400 UNIT) 400 UNITS: 10 TAB at 08:17

## 2021-06-27 RX ADMIN — BACITRACIN 1 SMALL APPLICATION: 500 OINTMENT TOPICAL at 17:34

## 2021-06-27 RX ADMIN — HEPARIN SODIUM 5000 UNITS: 5000 INJECTION INTRAVENOUS; SUBCUTANEOUS at 05:12

## 2021-06-27 RX ADMIN — OXYCODONE HYDROCHLORIDE 5 MG: 5 TABLET ORAL at 22:41

## 2021-06-27 RX ADMIN — BUPROPION HYDROCHLORIDE 75 MG: 75 TABLET, FILM COATED ORAL at 17:34

## 2021-06-27 RX ADMIN — OXYCODONE HYDROCHLORIDE 5 MG: 5 TABLET ORAL at 08:17

## 2021-06-27 RX ADMIN — ACETAMINOPHEN 975 MG: 325 TABLET, FILM COATED ORAL at 21:07

## 2021-06-27 RX ADMIN — CARVEDILOL 25 MG: 25 TABLET, FILM COATED ORAL at 17:34

## 2021-06-27 RX ADMIN — PANTOPRAZOLE SODIUM 40 MG: 40 TABLET, DELAYED RELEASE ORAL at 05:12

## 2021-06-27 RX ADMIN — HEPARIN SODIUM 5000 UNITS: 5000 INJECTION INTRAVENOUS; SUBCUTANEOUS at 21:08

## 2021-06-27 RX ADMIN — CARVEDILOL 25 MG: 25 TABLET, FILM COATED ORAL at 08:18

## 2021-06-27 RX ADMIN — ACETAMINOPHEN 975 MG: 325 TABLET, FILM COATED ORAL at 05:12

## 2021-06-27 RX ADMIN — HYDROXYCHLOROQUINE SULFATE 200 MG: 200 TABLET, FILM COATED ORAL at 08:17

## 2021-06-27 RX ADMIN — BUPROPION HYDROCHLORIDE 75 MG: 75 TABLET, FILM COATED ORAL at 08:17

## 2021-06-27 RX ADMIN — HEPARIN SODIUM 5000 UNITS: 5000 INJECTION INTRAVENOUS; SUBCUTANEOUS at 13:31

## 2021-06-27 RX ADMIN — SODIUM CHLORIDE 100 ML/HR: 0.9 INJECTION, SOLUTION INTRAVENOUS at 01:35

## 2021-06-27 RX ADMIN — OXYCODONE HYDROCHLORIDE 5 MG: 5 TABLET ORAL at 14:43

## 2021-06-27 RX ADMIN — ACETAMINOPHEN 975 MG: 325 TABLET, FILM COATED ORAL at 13:31

## 2021-06-27 RX ADMIN — GABAPENTIN 300 MG: 300 CAPSULE ORAL at 21:07

## 2021-06-27 RX ADMIN — CEFTRIAXONE SODIUM 1000 MG: 10 INJECTION, POWDER, FOR SOLUTION INTRAVENOUS at 20:33

## 2021-06-27 RX ADMIN — Medication 1 SPRAY: at 11:23

## 2021-06-27 RX ADMIN — BACITRACIN 1 SMALL APPLICATION: 500 OINTMENT TOPICAL at 11:11

## 2021-06-27 NOTE — PLAN OF CARE
Problem: PHYSICAL THERAPY ADULT  Goal: Performs mobility at highest level of function for planned discharge setting  See evaluation for individualized goals  Description: Treatment/Interventions: Functional transfer training, LE strengthening/ROM, Elevations, Therapeutic exercise, Endurance training, Patient/family training, Bed mobility, Gait training, Spoke to nursing, OT  Equipment Recommended: Man Mcnulty (pt has RW at home)       See flowsheet documentation for full assessment, interventions and recommendations  Note: Prognosis: Good  Problem List: Decreased strength, Decreased endurance, Impaired balance, Decreased mobility, Impaired judgement, Decreased cognition, Pain  Assessment:  Pt  78 y  o female presented w/ altered mental status & s/p falls at home w/ c/o R hip pain  Pt admitted for Acute cystitis without hematuria w/ Delirium, LAILA, acute metabolic encephalopathy & ambulatory dysfunction  Pt referred to PT for mobility assessment & D/C planning w/ orders of activity as tolerated  PTA, pt reports being I w/ SPC for household amb & w/ RW for community amb  On eval, pt demonstrate dec mobility, balance, endurance & amb  Pt require minAx1 for most functional mobility w/ RW + cues for techniques  Gait deviations as above, slow & unsteady w/ dec foot clearance but no gross LOB noted  No dizziness & SOB reported t/o session  Nsg staff most recent vital signs as follows: /74 (BP Location: Right arm)   Pulse 76   Temp (!) 97 3 °F (36 3 °C) (Oral)   Resp 18   SpO2 98%   At end of session, pt remain OOB in chair in stable condition, call bell & phone in reach, chair alarm activated, all lines intact  Fall precautions reinforced w/ good understanding  Pt functioning below baseline hence will continue skilled PT to improve function & safety   The patient's AM-PAC Basic Mobility Inpatient Short Form Raw Score is 18, Standardized Score is 41 05  A standardized score less than 42 9 suggests the patient may benefit from discharge to post-acute rehabilitation services  From PT standpoint, due to above mentioned deficits, dec caregiver support & high risk for falls, pt will benefit from inpt rehab at D/C  Pt agreeable to to STR  CM to follow  Nsg staff to continue to mobilized pt (OOB in chair for all meals & ambulate in room/unit) as tolerated to prevent further decline in function  Nsg notified  Barriers to Discharge: Inaccessible home environment, Decreased caregiver support  Barriers to Discharge Comments: (+) stairs; home alone during the day     PT Discharge Recommendation: Post acute rehabilitation services     PT - OK to Discharge: Yes (to STR when medically cleared)    See flowsheet documentation for full assessment

## 2021-06-27 NOTE — PROGRESS NOTES
[address]  Progress Note - Gurmeet Gonzalez 1955, 78 y o  female MRN: [id number]  Unit/Bed#: E5 -01 Encounter: [id number]  Primary Care Provider: Sherri Jenkins DO   Date and time admitted to hospital: 6/25/2021  4:23 PM    * Acute cystitis without hematuria  Assessment & Plan  · POA, presented with weakness and AMS  · Found to have evidence of UTI on UA with positive nitrites, innumerable bacteria and moderate leukocytes  · IV rocephin for now, f/u cultures and sensitivities  · S/p 1L LR , and 100ml/h for 12 hours overnight  · Urine culture is pending  · No leukocytosis, afebrile  · With LAILA on admission, 1 91 initially, trended down to 0 97 most recently    Acute metabolic encephalopathy  Assessment & Plan  · Brother reports worsening over the last 2 weeks as he can tell via their phone conversations  · On admission, the patient was mumbling, babbling, trailing off in her sentences, disoriented  · Alert oriented x3 today, thought content normal on my exam  · Suspect multifactorial in the setting of poor p o  intake, dehydration, LAILA & UTI  · Takes many narcotic meds at home, manages them on her own  Unable to get an accurate idea of how patient actually takes her medications  · Managed by outpatient pain management  · Also medical marijuana-urine positive for opiates and THC  · Morphine initially held in the setting of LAILA, geriatric pain protocol  · Restart morphine as an outpatient  · Geriatrics has been consulted, appreciate recommendations  · H/o vit D deficiency , will supplement daily  · TSH recently was normal  · Ammonia is nl    Anemia  Assessment & Plan  · Hgb 10 on admission, normocitic   According to chart review her baseline is 10-11  · Iron panel , vit b12 & folate are WNL  · Fecal occult blood is ordered  · Transfuse < 7 hgb    CHF (congestive heart failure) ([facility])  Assessment & Plan  Wt Readings from Last 3 Encounters:   01/20/20 63 7 kg (140 lb 6 9 oz) 09/22/18 60 9 kg (134 lb 3 2 oz)   11/17/16 64 9 kg (143 lb)       · NYHA 2 according to my chart review, did have EF 10 % back in 2004 when she first presented with non-ischemic cardiomyopathy  · Follows with cardiology as OP and on chart review they reported that the EF returned back to normal with coreg  · Continue coreg  · Pro bnp is elevated today 2,000 however patient seems very dry on exam  · CXR without acute pulmonary edema or pleural effusion, some venous congestion and cephalization of vessels  · Monitor fluid status   · I's and O's  · Daily weights      Mixed stress and urge urinary incontinence  Assessment & Plan  · Sees urogyn as OP  · Per my chart review does take Oxbutynin 5 mg daily for urinary incontinence  · Will continue to hold this for the time being since patient has uti and AMS  · I/O    Gastroesophageal reflux disease without esophagitis  Assessment & Plan  · Continue protonix 40 mg daily per chart review    Rheumatoid arthritis ([facility] [address] )  Assessment & Plan  · Continue plaquenil per chart review 200 mg b i d    Ambulatory dysfunction  Assessment & Plan  · Brother reports longstanding h/o falls, increasing over the last 2 months  · Last fall was yesterday  CT head without acute findings  C/o right hip pain, no acute fracture   · Would consider work up for chronic ischemic changes with MRI brain if mentation does not improve with treatment of UTI  · EKG NSR with PVCs and nonspecific T wave changes  · Orthostatics   · Geriatric consult  · PT/OT evaluation, appreciate recommendations    Asthma  Assessment & Plan  · Not acutely exacerbated  · Continue substituting symbicort for advair   · Albuterol p r n  Chronic pain  Assessment & Plan  · Of the spine with frequent epidurals, sees pain management as OP  · According to chart review her pain management routine is  As follows:  · Morphine ER 15 mg b i d   · cymbalta 60 mg b i d    · Brother reports that patient has been slow to get to PT as an OP  · PT/OT/CM placement ? LAILA (acute kidney injury) (HCC)-resolved as of 2021  Assessment & Plan  · Previous cr according to chart review is 0 9 in May 2021  · Cr on admission is 1 91--> trended down, most recent 0 97  · Suspect 2/2 uti, pre-renal azotemia  · UA with evidence of acute UTI  · Urinary retention protocol  · I/O  · Hold morphine from home, hold nephrotoxins, NSAIDs, avoid hypotension  · Minipress initially held, will restart as the patient's creatinine has improved    VTE Pharmacologic Prophylaxis:   Pharmacologic: Heparin  Mechanical VTE Prophylaxis in Place: Yes    Patient Centered Rounds: I have performed bedside rounds with nursing staff today  Discussions with Specialists or Other Care Team Provider:  None    Education and Discussions with Family / Patient:  Discussed plan of care with patient at bedside  Additionally, called patient's brother and discussed her plan of care at length  Her brother is worried that she is not taking her medications as prescribed, and will refuse rehab  Is requesting outpatient case management follow-up  Time Spent for Care: 20 minutes  More than 50% of total time spent on counseling and coordination of care as described above  Current Length of Stay: 2 day(s)    Current Patient Status: Inpatient   Certification Statement: The patient will continue to require additional inpatient hospital stay due to IV antibiotics    Discharge Plan: To short-term rehab within 24-48 hours    Code Status: Level 1 - Full Code      Subjective:   Patient is alert and oriented x3 on exam today  States she is feeling significantly improved from when she 1st came in  Denies any chest pain, shortness of breath, nausea, vomiting, diarrhea, constipation, fevers/chills  Denies any dysuria, hematuria  States she would like me to update her brother accordingly as he is worried about her      Objective:     Vitals:   Temp (24hrs), Av 8 °F (36 6 °C), Min:97 3 °F (36 3 °C), Max:98 2 °F (36 8 °C)    Temp:  [97 3 °F (36 3 °C)-98 2 °F (36 8 °C)] 97 3 °F (36 3 °C)  HR:  [66-81] 76  Resp:  [16-18] 18  BP: (123-143)/(65-74) 143/74  SpO2:  [96 %-98 %] 98 %  There is no height or weight on file to calculate BMI  Input and Output Summary (last 24 hours): Intake/Output Summary (Last 24 hours) at 6/27/2021 1207  Last data filed at 6/27/2021 0809  Gross per 24 hour   Intake 120 ml   Output --   Net 120 ml       Physical Exam:     Physical Exam  Vitals and nursing note reviewed  Constitutional:       General: She is not in acute distress  Appearance: Normal appearance  She is well-developed and normal weight  She is not ill-appearing, toxic-appearing or diaphoretic  HENT:      Head: Normocephalic and atraumatic  Eyes:      General: No scleral icterus  Conjunctiva/sclera: Conjunctivae normal    Cardiovascular:      Rate and Rhythm: Normal rate and regular rhythm  Heart sounds: No murmur heard  No friction rub  No gallop  Pulmonary:      Effort: Pulmonary effort is normal  No respiratory distress  Breath sounds: Normal breath sounds  No stridor  No wheezing, rhonchi or rales  Chest:      Chest wall: No tenderness  Abdominal:      General: Abdomen is flat  Bowel sounds are normal  There is no distension  Palpations: Abdomen is soft  Tenderness: There is no abdominal tenderness  Musculoskeletal:      Cervical back: Neck supple  Right lower leg: No edema  Left lower leg: No edema  Skin:     General: Skin is warm and dry  Capillary Refill: Capillary refill takes less than 2 seconds  Coloration: Skin is not jaundiced or pale  Findings: No erythema  Neurological:      General: No focal deficit present  Mental Status: She is alert and oriented to person, place, and time  Mental status is at baseline  Cranial Nerves: No cranial nerve deficit  Motor: No weakness         Additional Data:     Labs:    Results from last 7 days   Lab Units 06/27/21  0457   WBC Thousand/uL 3 41*   HEMOGLOBIN g/dL 10 0*   HEMATOCRIT % 30 9*   PLATELETS Thousands/uL 144*   NEUTROS PCT % 58   LYMPHS PCT % 30   MONOS PCT % 11   EOS PCT % 0     Results from last 7 days   Lab Units 06/27/21  0457 06/26/21  0346   SODIUM mmol/L 142 141   POTASSIUM mmol/L 3 5 3 8   CHLORIDE mmol/L 110* 108   CO2 mmol/L 23 28   BUN mg/dL 12 21   CREATININE mg/dL 0 97 1 45*   ANION GAP mmol/L 9 5   CALCIUM mg/dL 9 4 9 5   ALBUMIN g/dL  --  2 6*   TOTAL BILIRUBIN mg/dL  --  0 33   ALK PHOS U/L  --  66   ALT U/L  --  13   AST U/L  --  17   GLUCOSE RANDOM mg/dL 70 80                           * I Have Reviewed All Lab Data Listed Above  * Additional Pertinent Lab Tests Reviewed:  Sneha Matthews Admission Reviewed    Imaging:    Imaging Reports Reviewed Today Include:  CT head  Imaging Personally Reviewed by Myself Includes:  CT head    Recent Cultures (last 7 days):           Last 24 Hours Medication List:   Current Facility-Administered Medications   Medication Dose Route Frequency Provider Last Rate    acetaminophen  975 mg Oral Q8H [address] Angela Wright PA-C      bacitracin  1 small application Topical BID Susannajermaine Warner PA-C      budesonide-formoterol  2 puff Inhalation BID Angela Castillo [place]      buPROPion  75 mg Oral BID Angela Wright PA-C      calcium carbonate  500 mg Oral TID PRN LAURA López      carvedilol  25 mg Oral BID With Meals Angela Wright PA-C      cefTRIAXone  1,000 mg Intravenous Q24H SHERRIE DiasC 1,000 mg (06/26/21 1937)    cholecalciferol  400 Units Oral Daily Angela Wright PA-C      gabapentin  300 mg Oral HS Angela Wright PA-C      heparin (porcine)  5,000 Units Subcutaneous Q8H [address] [place], [place]      HYDROmorphone  0 2 mg Intravenous Q4H PRN Angela Wright PA-C      hydroxychloroquine  200 mg Oral BID Angela Wright PA-C      lamoTRIgine  150 mg Oral HS Angela Wright PA-C      LORazepam  0 5 mg Intravenous HS PRN Pippa Acevedo PA-C      naloxone  0 04 mg Intravenous Q1MIN PRN Angela Demo, OWEN      ondansetron  4 mg Intravenous Q4H PRN Angela Demo, OWEN      oxyCODONE  2 5 mg Oral Q4H PRN Angela Demo, OWEN      oxyCODONE  5 mg Oral Q4H PRN Angela Demo, OWEN      pantoprazole  40 mg Oral Early Morning Angela Demvalentina, OWEN      polyethylene glycol  17 g Oral Daily PRN Angela Demo, OWEN      senna  1 tablet Oral HS Beckie Patel PA-C      sodium chloride  1 spray Each Nare Q1H PRN Beckie Patel PA-C      sodium chloride  100 mL/hr Intravenous Continuous Gilles Yeung  mL/hr (06/27/21 0135)        Today, Patient Was Seen By: Beckie Patel PA-C    ** Please Note: Dictation voice to text software may have been used in the creation of this document   **

## 2021-06-27 NOTE — OCCUPATIONAL THERAPY NOTE
Occupational Therapy Evaluation     Patient Name: Rickey Edward  UJIDD'O Date: 6/27/2021  Problem List  Principal Problem:    Acute cystitis without hematuria  Active Problems:    Chronic pain    Asthma    Ambulatory dysfunction    Acute metabolic encephalopathy    Rheumatoid arthritis ([facility] [address] )    Gastroesophageal reflux disease without esophagitis    Mixed stress and urge urinary incontinence    CHF (congestive heart failure) ([facility] [address] )    Anemia    Past Medical History  Past Medical History:   Diagnosis Date    Anxiety     Asthma     Chronic pain     Depression     Enterovirus heart infection      Past Surgical History  Past Surgical History:   Procedure Laterality Date    FIXATION KYPHOPLASTY LUMBAR SPINE      HYSTERECTOMY      REPAIR RECTOCELE               06/27/21 0921   OT Last Visit   OT Visit Date 06/27/21   Note Type   Note type Evaluation   Restrictions/Precautions   Weight Bearing Precautions Per Order No   Other Precautions Cognitive; Bed Alarm;Multiple lines; Chair Alarm; Fall Risk;Pain  (alan)   Pain Assessment   Pain Assessment Tool Wheeler-Baker FACES   Wheeler-Baker FACES Pain Rating 2   Pain Location/Orientation Location: Head   Hospital Pain Intervention(s) Ambulation/increased activity;Repositioned; Emotional support   Home Living   Type of [address] [place] [address] Multi-level; Laundry in basement;Stairs to enter with rails  (1 TAB, flight to 2nd floor, bathroom 1st floor in back)   Bathroom Shower/Tub Walk-in shower   Bathroom Toilet Standard   Bathroom Equipment Grab bars in [facility] [address] Walker;Cane   Additional Comments pt reports bathroom is in back of house and "tiles are right on the ground, so it is cold sometimes"   Prior Function   Level of [place] Independent with ADLs and functional mobility  (SPC in house because hallways narrow RW does not fit)   Lives With [employer] Help From Family   ADL Assistance Independent   IADLs Independent   Falls in the last 6 months 1 to 4  (atleast 4)   Vocational On disability   Comments pt reports brother provides transport; reports son works during the day and she is alone, reports she and her son don't cook or clean and house is messy and they order out to eat, pt does own medications and reports is difficulty informed on a medication planner (sun-sat w/ am, afternoon and pm)   Lifestyle   Autonomy per pt independent w/ ADLs, independent w/ functional transfers and mobiltiy w/ SPC or RW, independent w/ IADLs (does laundry in basement)   Reciprocal Relationships son   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   Intrinsic Gratification watching tv   ADL   [address] 5  [address] [facility] 4  Minimal Assistance   [address]  [address] 3  Moderate Assistance   [address] 4  [address] 3  Moderate Assistance   [address]  3  [address] [facility] 3  Moderate Assistance   Bed Mobility   Additional Comments pt seated in recliner pre/post session w/ alarm intact   Transfers   Sit to Stand 4  Minimal assistance   Additional items Assist x 1; Increased time required;Verbal cues;Armrests   Stand to Sit 4  Minimal assistance   Additional items Assist x 1; Increased time required;Verbal cues;Armrests   Additional Comments cues for hand placement and techniques   Functional Mobility   Functional Mobility 4  Minimal assistance   Additional Comments assist x1 and SBA of 2nd for safety w/ increased time and cues to stay within RW   Additional items Rolling walker   Balance   Static Sitting Fair +   Dynamic Sitting Fair   Static Standing Fair -   Dynamic Standing Poor +   Ambulatory Poor +   Activity Tolerance   Activity Tolerance Patient limited by fatigue;Treatment limited secondary to medical complications (Comment)   Nurse Made Aware appropriate to see per Janae EGAN   RUE Assessment   RUE Assessment WFL  (4-/5)   LUE Assessment   LUE Assessment WFL  (limited elevation, ecchymosis area shoulder, 3+/5)   Hand Function   Gross Motor Coordination Functional  (increased time)   Fine Motor Coordination Functional  (arthritis of digits and edema)   Sensation   Light Touch No apparent deficits   Proprioception   Proprioception No apparent deficits   Vision-Basic Assessment   Current Vision No visual deficits   Vision - Complex Assessment   Ocular Range of Motion WFL   Acuity Able to read clock/calendar on wall without difficulty   Additional Comments reports sensitivity to light/noises and headaches w/ concussion symptoms since fall   Perception   Inattention/Neglect Appears intact   Cognition   Overall Cognitive Status Impaired   Arousal/Participation Responsive; Cooperative   Attention Attends with cues to redirect   Orientation Level Oriented to place;Oriented to person  (oriented to month and year)   Memory Decreased recall of precautions;Decreased recall of recent events;Decreased short term memory   Following Commands Follows one step commands without difficulty   Comments pt reports w/ since fall difficulties concentrating and focusing on tasks (concussion symptoms), impaired insight and safety awareness, impaired STM able to recall 1/5 words after several minutes (2recalled 1/ category cues and 1 recall w/ multiple choice), language fluency can state 17 words beginning w/ "T" in one minute   Cognition Assessment Tools MOCA  (mini version 2 1)   Score 9  (9/15)   Assessment   Limitation Decreased ADL status; Decreased Safe judgement during ADL;Decreased UE strength;Decreased cognition;Decreased endurance;Decreased self-care trans;Decreased high-level ADLs   Prognosis Good   Assessment Pt is a 78 y o  female seen for OT evaluation s/p admit to [facility] on 6/25/2021 w/ fall and Acute cystitis without hematuria, acute metabolic encephalopathy  Comorbidities affecting pt's functional performance at time of assessment include: LAILA, ambulatory dysfunction, anemia, CHF, mixed stress and urinary incontinence, GERD, rheumatoid arthritis, asthma, chronic pain  CT head: No acute intracranial abnormality  X-ray hip/pelvis: No acute osseous abnormality  Personal factors affecting pt at time of IE include: alone during the day when son works; pt w/ ecchymosis area by left eye and left shoulder  Prior to admission, pt was living w/ son and reports independent w/ ADLs, independent w/ functional transfers and mobility w/ SPC and RW, independent w/ IADLS (laundry), assist transport from brother and reports difficulty w/ medication management  Upon evaluation: Pt requires MIN assist sit<>stand w/ VCs for hand placement and positioning, MIN assist x1 w/ SBA of 2nd for functional mobility w/ RW and cues to stay within RW, MIN assist UB ADLs, MOD assist LB ADLs, MOD assist toileting 2* the following deficits impacting occupational performance: decreased strength and endurance, impaired balance, increased sensitivity to light and sounds and headache as well as difficulty concentrating since fall, impaired activity tolerance, decreased insight and safety awareness, impaired STM (able to recall 1/3 words after several minutes), limited L UE strength and elevation since fall  Pt to benefit from continued skilled OT tx while in the hospital to address deficits as defined above and maximize level of functional independence w ADL's and functional mobility  Occupational Performance areas to address include: grooming, bathing/shower, toilet hygiene, dressing, health maintenance, functional mobility, clothing management, cleaning and meal prep, formal cognitive assessment, concussion symptom checklist  From OT standpoint, recommendation at time of d/c would be short term rehab   The patient's raw score on the AM-PAC Daily Activity inpatient short form is 15, standardized score is 34 69, less than 39 4  Patients at this level are likely to benefit from discharge to post-acute rehabilitation services  Please refer to the recommendation of the Occupational Therapist for safe discharge planning  Goals   Patient Goals "to get better"   LTG Time Frame 10-14   Long Term Goal please see below goals   Plan   Treatment Interventions ADL retraining;Functional transfer training;UE strengthening/ROM; Cognitive reorientation; Endurance training;Patient/family training;Equipment evaluation/education; Compensatory technique education; Energy conservation; Activityengagement   Goal Expiration Date 07/11/21   OT Frequency 3-5x/wk   Recommendation   Recommendation Geriatric Consult   OT Discharge Recommendation Post acute rehabilitation services   OT - OK to Discharge   (to rehab when medically stable)   AM-PAC Daily Activity Inpatient   Lower Body Dressing 2   Bathing 2   Toileting 2   Upper Body Dressing 3   Grooming 2   Eating 4   Daily Activity Raw Score 15   Daily Activity Standardized Score (Calc for Raw Score >=11) 34 69   AM-PAC Applied Cognition Inpatient   Following a Speech/Presentation 3   Understanding Ordinary Conversation 4   Taking Medications 2   Remembering Where Things Are Placed or Put Away 2   Remembering List of 4-5 Errands 2   Taking Care of Complicated Tasks 2   Applied Cognition Raw Score 15   Applied Cognition Standardized Score 33 54   Modified [place] Scale   Modified [place] Scale 4     Occupational Therapy Goals to be met in 10-14 days:  1) Pt will improve activity tolerance to G for 30 min txment sessions to enhance ADLS  2) Pt will complete ADLs/self care w/ supervision  3) Pt will complete toileting w/ supervision w/ G hygiene/thoroughness using DME PRN  4) Pt will improve functional transfers on/off all surfaces using DME PRN w/ G balance/safety including toileting w/ mod I  5) Pt will improve fx'l mobility during I/ADl/leisure tasks using DME PRN w/ g balance/safety w/ supervision  6) Pt will engage in ongoing cognitive assessment w/ G participation to A w/ safe d/c planning/recommendations  7) Pt will demonstrate G carryover of pt/caregiver education and training as appropriate w/ mod I  w/ G tolerance  8) Pt will engage in depression screen/leisure interest checklist w/ G participation to monitor s/s depression and ID 3 positive coping strategies to A w/ emotional regulation and management  9) Pt will demonstrate 100% carryover of E C  techniques w/ mod I t/o fx'l I/ADL/leisure tasks w/o cues s/p skilled education  10) Pt will demonstrate improved bed mobility to supervision  11) Pt will demonstrate 100% carryover of LHAE for LB ADLs/self care and leisure s/p skilled education w/ mod I and G participation  12) Pt will demonstrate improved standing tolerance to 3-5 minutes during functional tasks w/ Fair + dynamic standing balance to enhance ADL performance  13) Pt will demonstrate improved b/l UE strength by 1 MMT grade to enhance ADLS and functional transfers  14) Pt will recall 3 fall prevention education strategies to enhance safety in the home and prevent further falls to increase overall quality of life    Documentation completed by: Jensen Pete MS, OTR/L

## 2021-06-27 NOTE — PLAN OF CARE
Problem: OCCUPATIONAL THERAPY ADULT  Goal: Performs self-care activities at highest level of function for planned discharge setting  See evaluation for individualized goals  Description: Treatment Interventions: ADL retraining, Functional transfer training, UE strengthening/ROM, Cognitive reorientation, Endurance training, Patient/family training, Equipment evaluation/education, Compensatory technique education, Energy conservation, Activityengagement          See flowsheet documentation for full assessment, interventions and recommendations  6/27/2021 1144 by Eugenia Gunderson OT  Note: Limitation: Decreased ADL status, Decreased Safe judgement during ADL, Decreased UE strength, Decreased cognition, Decreased endurance, Decreased self-care trans, Decreased high-level ADLs  Prognosis: Good  Assessment: Pt is a 78 y o  female seen for OT evaluation s/p admit to [facility] on 6/25/2021 w/ fall and Acute cystitis without hematuria, acute metabolic encephalopathy  Comorbidities affecting pt's functional performance at time of assessment include: LAILA, ambulatory dysfunction, anemia, CHF, mixed stress and urinary incontinence, GERD, rheumatoid arthritis, asthma, chronic pain  CT head: No acute intracranial abnormality  X-ray hip/pelvis: No acute osseous abnormality  Personal factors affecting pt at time of IE include: alone during the day when son works; pt w/ ecchymosis area by left eye and left shoulder  Prior to admission, pt was living w/ son and reports independent w/ ADLs, independent w/ functional transfers and mobility w/ SPC and RW, independent w/ IADLS (laundry), assist transport from brother and reports difficulty w/ medication management   Upon evaluation: Pt requires MIN assist sit<>stand w/ VCs for hand placement and positioning, MIN assist x1 w/ SBA of 2nd for functional mobility w/ RW and cues to stay within RW, MIN assist UB ADLs, MOD assist LB ADLs, MOD assist toileting 2* the following deficits impacting occupational performance: decreased strength and endurance, impaired balance, increased sensitivity to light and sounds and headache as well as difficulty concentrating since fall, impaired activity tolerance, decreased insight and safety awareness, impaired STM (able to recall 1/3 words after several minutes), limited L UE strength and elevation since fall  Pt to benefit from continued skilled OT tx while in the hospital to address deficits as defined above and maximize level of functional independence w ADL's and functional mobility  Occupational Performance areas to address include: grooming, bathing/shower, toilet hygiene, dressing, health maintenance, functional mobility, clothing management, cleaning and meal prep, formal cognitive assessment, concussion symptom checklist  From OT standpoint, recommendation at time of d/c would be short term rehab  The patient's raw score on the AM-PAC Daily Activity inpatient short form is 15, standardized score is 34 69, less than 39 4  Patients at this level are likely to benefit from discharge to post-acute rehabilitation services  Please refer to the recommendation of the Occupational Therapist for safe discharge planning  Recommendation: Geriatric Consult  OT Discharge Recommendation: Post acute rehabilitation services  OT - OK to Discharge:  (to rehab when medically stable)    6/27/2021 1144 by Narcisa Mcneill OT  Note: Limitation: Decreased ADL status, Decreased Safe judgement during ADL, Decreased UE strength, Decreased cognition, Decreased endurance, Decreased self-care trans, Decreased high-level ADLs  Prognosis: Good  Assessment: Pt is a 78 y o  female seen for OT evaluation s/p admit to [facility] on 6/25/2021 w/ fall and Acute cystitis without hematuria, acute metabolic encephalopathy    Comorbidities affecting pt's functional performance at time of assessment include: LIALA, ambulatory dysfunction, anemia, CHF, mixed stress and urinary incontinence, GERD, rheumatoid arthritis, asthma, chronic pain  CT head: No acute intracranial abnormality  X-ray hip/pelvis: No acute osseous abnormality  Personal factors affecting pt at time of IE include: alone during the day when son works; pt w/ ecchymosis area by left eye and left shoulder  Prior to admission, pt was living w/ son and reports independent w/ ADLs, independent w/ functional transfers and mobility w/ SPC and RW, independent w/ IADLS (laundry), assist transport from brother and reports difficulty w/ medication management  Upon evaluation: Pt requires MIN assist sit<>stand w/ VCs for hand placement and positioning, MIN assist x1 w/ SBA of 2nd for functional mobility w/ RW and cues to stay within RW, MIN assist UB ADLs, MOD assist LB ADLs, MOD assist toileting 2* the following deficits impacting occupational performance: decreased strength and endurance, impaired balance, increased sensitivity to light and sounds and headache as well as difficulty concentrating since fall, impaired activity tolerance, decreased insight and safety awareness, impaired STM (able to recall 1/3 words after several minutes), limited L UE strength and elevation since fall  Pt to benefit from continued skilled OT tx while in the hospital to address deficits as defined above and maximize level of functional independence w ADL's and functional mobility  Occupational Performance areas to address include: grooming, bathing/shower, toilet hygiene, dressing, health maintenance, functional mobility, clothing management, cleaning and meal prep, formal cognitive assessment, concussion symptom checklist  From OT standpoint, recommendation at time of d/c would be short term rehab  The patient's raw score on the AM-PAC Daily Activity inpatient short form is 15, standardized score is 34 69, less than 39 4  Patients at this level are likely to benefit from discharge to post-acute rehabilitation services   Please refer to the recommendation of the Occupational Therapist for safe discharge planning    Recommendation: Geriatric Consult  OT Discharge Recommendation: Post acute rehabilitation services  OT - OK to Discharge:  (to rehab when medically stable)

## 2021-06-27 NOTE — PHYSICAL THERAPY NOTE
PT EVALUATION    Pt  Name: Adriano Pearson  Pt  Age: 78 y o  MRN: [id number]  LENGTH OF STAY: 2    Patient Active Problem List   Diagnosis    Chronic pain    Asthma    Ambulatory dysfunction    Acute metabolic encephalopathy    Acute cystitis without hematuria    Rheumatoid arthritis ([facility] )    Gastroesophageal reflux disease without esophagitis    Mixed stress and urge urinary incontinence    [facility]congestive [facility] failure) [facility])    Anemia       Admitting Diagnoses:   Delirium [R41 0]  Anemia [D64 9]  Flank pain [R10 9]  Acute urinary tract infection [N39 0]  LAILA (acute kidney injury) ([facility] ) [N17 9]  Multiple falls [R29 6]  Injury of head, initial encounter [S09 90XA]    Past Medical History:   Diagnosis Date    Anxiety     Asthma     Chronic pain     Depression     Enterovirus heart infection        Past Surgical History:   Procedure Laterality Date    FIXATION KYPHOPLASTY LUMBAR SPINE      HYSTERECTOMY      REPAIR RECTOCELE         Imaging Studies:  CT head wo contrast   Final Result by Damien De La Torre DO (06/25 1842)      No acute intracranial abnormality  Chronic maxillary sinus opacification  Workstation performed: [id number]         XR chest 1 view portable   ED Interpretation by Stanley Hummel MD (06/25 1935)   Primary reviewed: no acute abnormality      Final Result by Sheryl Messer MD (06/26 1050)      No acute cardiopulmonary disease  Workstation performed: [id number]         XR hip/pelv 2-3 vws right   ED Interpretation by Stanley Hummel MD (06/25 1859)   Primary reviewed; No acute abnormality      Final Result by Sheryl Messer MD (06/26 1050)      No acute osseous abnormality        Workstation performed: [id number]              06/27/21 0859   PT Last Visit   PT Visit Date 06/27/21   Note Type   Note type Evaluation   Pain Assessment   Pain Assessment Tool Wheeler-Baker FACES   Wheeler-Baker FACES Pain Rating 2   Pain Location/Orientation Location: [facility] Pain Intervention(s) Repositioned; Ambulation/increased activity; Emotional support; Rest   Home Living   Type of Home House   Home Layout Multi-level; Laundry in basement;Stairs to enter with rails; Other (Comment)  (1STE; FOS to basement & 2nd flr bedroom)   Bathroom Shower/Tub Walk-in shower   Bathroom Toilet Standard   Bathroom Equipment Grab bars in [facility] Walker;Cane   Additional Comments Bathroom is the back of the house  Prior Function   Level of [place] Independent with ADLs and functional mobility  (w/ SPC for household amb; w/ RW for community amb)   Lives With Son   Yesenia in the last 6 months 1 to 4  (4x)   Vocational On disability   Comments (-) ; pt home alone when son works   Restrictions/Precautions   Wells Vanessa Bearing Precautions Per Order No   Other Precautions Cognitive; Chair Alarm; Bed Alarm;Multiple lines; Fall Risk;Pain  (masimo)   General   Family/Caregiver Present No   Cognition   Overall Cognitive Status Impaired   Arousal/Participation Alert   Orientation Level Oriented to person;Oriented to place   Following Commands Follows one step commands without difficulty   Comments pt pleasant & cooperative   RUE Assessment   RUE Assessment   (refer to OT)   LUE Assessment   LUE Assessment   (refer to OT)   RLE Assessment   RLE Assessment WFL  (4/5 grossly)   LLE Assessment   LLE Assessment WFL  (4/5 grossly)   Coordination   Movements are Fluid and Coordinated 1   Sensation WFL   Bed Mobility   Supine to Sit Unable to assess   Sit to Supine Unable to assess   Additional Comments pt OOB in chair pre & post session   Transfers   Sit to Stand 4  Minimal assistance   Additional items Assist x 1; Armrests; Increased time required;Verbal cues   Stand to Sit 4  Minimal assistance   Additional items Assist x 1; Armrests; Increased time required;Verbal cues   Additional Comments cues for techniques & safety; assist of another for lines   Ambulation/Elevation   Gait pattern Decreased foot clearance; Excessively slow;Narrow AYLA   Gait Assistance 4  Minimal assist   Additional items Assist x 1;Verbal cues; Tactile cues; Other (Comment)  (assist of another for lines)   Assistive Device Rolling walker   Distance 40'x1   Balance   Static Sitting Fair +   Dynamic Sitting Fair   Static Standing Fair -  (w/ RW)   Dynamic Standing Poor +  (w/ RW)   Ambulatory Poor +  (w/ RW)   Activity Tolerance   Activity Tolerance Patient limited by fatigue;Treatment limited secondary to medical complications (Comment)   Medical Staff Made Aware GUNNAR Douglas   Nurse Made Aware RN Radha   Assessment   Prognosis Good   Problem List Decreased strength;Decreased endurance; Impaired balance;Decreased mobility; Impaired judgement;Decreased cognition;Pain   Assessment  Pt  78 y  o female presented w/ altered mental status & s/p falls at home w/ c/o R hip pain  Pt admitted for Acute cystitis without hematuria w/ Delirium, LAILA, acute metabolic encephalopathy & ambulatory dysfunction  Pt referred to PT for mobility assessment & D/C planning w/ orders of activity as tolerated  PTA, pt reports being I w/ SPC for household amb & w/ RW for community amb  On eval, pt demonstrate dec mobility, balance, endurance & amb  Pt require minAx1 for most functional mobility w/ RW + cues for techniques  Gait deviations as above, slow & unsteady w/ dec foot clearance but no gross LOB noted  No dizziness & SOB reported t/o session  Nsg staff most recent vital signs as follows: /74 (BP Location: Right arm)   Pulse 76   Temp (!) 97 3 °F (36 3 °C) (Oral)   Resp 18   SpO2 98%   At end of session, pt remain OOB in chair in stable condition, call bell & phone in reach, chair alarm activated, all lines intact  Fall precautions reinforced w/ good understanding  Pt functioning below baseline hence will continue skilled PT to improve function & safety   The patient's AM-PAC Basic Mobility Inpatient Short Form Raw Score is 18, Standardized Score is 41 05  A standardized score less than 42 9 suggests the patient may benefit from discharge to post-acute rehabilitation services  From PT standpoint, due to above mentioned deficits, dec caregiver support & high risk for falls, pt will benefit from inpt rehab at D/C  Pt agreeable to to STR  CM to follow  Nsg staff to continue to mobilized pt (OOB in chair for all meals & ambulate in room/unit) as tolerated to prevent further decline in function  Nsg notified  Barriers to Discharge Inaccessible home environment;Decreased caregiver support   Barriers to Discharge Comments (+) stairs; home alone during the day   Goals   Patient Goals to get better   STG Expiration Date 07/07/21   Short Term Goal #1 Goals to be met in 10 days; pt will be able to: 1) inc strength & balance by 1/2 grade to improve overall functional mobility & dec fall risk; 2) inc bed mobility to modified I for pt to be able to get in/OOB safely w/ proper techniques 100% of the time, to dec caregiver burden & safely function at home; 3) inc transfers to modified I for pt to transition safely from one surface to another w/o % of the time, to dec caregiver burden & safely function at home; 4) inc amb w/ RW approx  >80' w/ modified I for pt to ambulate household distances w/o any % of the time, to dec caregiver burden & safely function at home; 5) negotiate stairs w/ S for inc safety during stair mgt inside/outside of home & dec caregiver burden; 6) pt/caregiver ed   PT Treatment Day 0   Plan   Treatment/Interventions Functional transfer training;LE strengthening/ROM; Elevations; Therapeutic exercise; Endurance training;Patient/family training;Bed mobility;Gait training;Spoke to nursing;OT   PT Frequency Other (Comment)  (3-5x/wk)   Recommendation   PT Discharge Recommendation Post acute rehabilitation services   Equipment Recommended Jeana Hazel  (pt has RW at home)   PT - OK to Discharge Yes  (to STR when medically cleared)   AM-PAC Basic Mobility Inpatient   Turning in Bed Without Bedrails 4   Lying on Back to Sitting on Edge of Flat Bed 3   Moving Bed to Chair 3   Standing Up From Chair 3   Walk in Room 3   Climb 3-5 Stairs 2   Basic Mobility Inpatient Raw Score 18   Basic Mobility Standardized Score 41 05   Hx/personal factors: co-morbidities, inaccessible home, dec caregiver support, mutliple lines, use of AD, dec cognition, pain, h/o of falls, fall risk and assist w/ ADL's  Examination: dec mobility, dec balance, dec endurance, dec amb, risk for falls, pain, impaired cognition, AM-PAC score of 18  Clinical: unpredictable (ongoing medical status, abnormal lab values, risk for falls and pain mgt)  Complexity: high     Tavo Stephanie, PT

## 2021-06-28 VITALS
WEIGHT: 140.65 LBS | SYSTOLIC BLOOD PRESSURE: 126 MMHG | HEART RATE: 70 BPM | OXYGEN SATURATION: 97 % | DIASTOLIC BLOOD PRESSURE: 77 MMHG | RESPIRATION RATE: 16 BRPM | TEMPERATURE: 98.4 F

## 2021-06-28 LAB
ANION GAP SERPL CALCULATED.3IONS-SCNC: 7 MMOL/L (ref 4–13)
BUN SERPL-MCNC: 8 MG/DL (ref 5–25)
CALCIUM SERPL-MCNC: 9.4 MG/DL (ref 8.3–10.1)
CHLORIDE SERPL-SCNC: 111 MMOL/L (ref 100–108)
CO2 SERPL-SCNC: 26 MMOL/L (ref 21–32)
CREAT SERPL-MCNC: 0.94 MG/DL (ref 0.6–1.3)
ERYTHROCYTE [DISTWIDTH] IN BLOOD BY AUTOMATED COUNT: 13 % (ref 11.6–15.1)
GFR SERPL CREATININE-BSD FRML MDRD: 63 ML/MIN/1.73SQ M
GLUCOSE SERPL-MCNC: 91 MG/DL (ref 65–140)
HCT VFR BLD AUTO: 30.5 % (ref 34.8–46.1)
HGB BLD-MCNC: 10.1 G/DL (ref 11.5–15.4)
MCH RBC QN AUTO: 30.5 PG (ref 26.8–34.3)
MCHC RBC AUTO-ENTMCNC: 33.1 G/DL (ref 31.4–37.4)
MCV RBC AUTO: 92 FL (ref 82–98)
PLATELET # BLD AUTO: 152 THOUSANDS/UL (ref 149–390)
PMV BLD AUTO: 9.8 FL (ref 8.9–12.7)
POTASSIUM SERPL-SCNC: 3.1 MMOL/L (ref 3.5–5.3)
RBC # BLD AUTO: 3.31 MILLION/UL (ref 3.81–5.12)
SODIUM SERPL-SCNC: 144 MMOL/L (ref 136–145)
WBC # BLD AUTO: 3.34 THOUSAND/UL (ref 4.31–10.16)

## 2021-06-28 PROCEDURE — 97110 THERAPEUTIC EXERCISES: CPT

## 2021-06-28 PROCEDURE — RECHECK: Performed by: INTERNAL MEDICINE

## 2021-06-28 PROCEDURE — 85027 COMPLETE CBC AUTOMATED: CPT | Performed by: PHYSICIAN ASSISTANT

## 2021-06-28 PROCEDURE — 97116 GAIT TRAINING THERAPY: CPT

## 2021-06-28 PROCEDURE — 80048 BASIC METABOLIC PNL TOTAL CA: CPT | Performed by: PHYSICIAN ASSISTANT

## 2021-06-28 PROCEDURE — 99239 HOSP IP/OBS DSCHRG MGMT >30: CPT | Performed by: INTERNAL MEDICINE

## 2021-06-28 RX ORDER — HYDROXYCHLOROQUINE SULFATE 200 MG/1
200 TABLET, FILM COATED ORAL 2 TIMES DAILY
Qty: 60 TABLET | Refills: 0 | Status: SHIPPED | OUTPATIENT
Start: 2021-06-28 | End: 2022-01-10 | Stop reason: SDUPTHER

## 2021-06-28 RX ORDER — POLYETHYLENE GLYCOL 3350 17 G/17G
17 POWDER, FOR SOLUTION ORAL DAILY PRN
Qty: 24 EACH | Refills: 0 | Status: ON HOLD | OUTPATIENT
Start: 2021-06-28 | End: 2022-03-04

## 2021-06-28 RX ORDER — POTASSIUM CHLORIDE 20 MEQ/1
40 TABLET, EXTENDED RELEASE ORAL ONCE
Status: COMPLETED | OUTPATIENT
Start: 2021-06-28 | End: 2021-06-28

## 2021-06-28 RX ORDER — CEPHALEXIN 500 MG/1
500 CAPSULE ORAL EVERY 6 HOURS SCHEDULED
Qty: 20 CAPSULE | Refills: 0 | Status: SHIPPED | OUTPATIENT
Start: 2021-06-28 | End: 2021-07-03

## 2021-06-28 RX ORDER — GABAPENTIN 300 MG/1
300 CAPSULE ORAL 2 TIMES DAILY
Qty: 60 CAPSULE | Refills: 0 | Status: SHIPPED | OUTPATIENT
Start: 2021-06-28

## 2021-06-28 RX ORDER — BUDESONIDE AND FORMOTEROL FUMARATE DIHYDRATE 80; 4.5 UG/1; UG/1
2 AEROSOL RESPIRATORY (INHALATION) 2 TIMES DAILY
Qty: 10.2 G | Refills: 0 | Status: ON HOLD | OUTPATIENT
Start: 2021-06-28 | End: 2022-03-04

## 2021-06-28 RX ORDER — CEPHALEXIN 500 MG/1
500 CAPSULE ORAL EVERY 6 HOURS SCHEDULED
Status: DISCONTINUED | OUTPATIENT
Start: 2021-06-28 | End: 2021-06-28 | Stop reason: HOSPADM

## 2021-06-28 RX ORDER — GABAPENTIN 300 MG/1
300 CAPSULE ORAL 2 TIMES DAILY
Status: DISCONTINUED | OUTPATIENT
Start: 2021-06-28 | End: 2021-06-28 | Stop reason: HOSPADM

## 2021-06-28 RX ORDER — PANTOPRAZOLE SODIUM 40 MG/1
40 TABLET, DELAYED RELEASE ORAL
Qty: 30 TABLET | Refills: 0 | Status: SHIPPED | OUTPATIENT
Start: 2021-06-29

## 2021-06-28 RX ADMIN — CEPHALEXIN 500 MG: 500 CAPSULE ORAL at 17:06

## 2021-06-28 RX ADMIN — BUDESONIDE AND FORMOTEROL FUMARATE DIHYDRATE 2 PUFF: 80; 4.5 AEROSOL RESPIRATORY (INHALATION) at 17:11

## 2021-06-28 RX ADMIN — POTASSIUM CHLORIDE 40 MEQ: 1500 TABLET, EXTENDED RELEASE ORAL at 12:55

## 2021-06-28 RX ADMIN — HEPARIN SODIUM 5000 UNITS: 5000 INJECTION INTRAVENOUS; SUBCUTANEOUS at 05:18

## 2021-06-28 RX ADMIN — GABAPENTIN 300 MG: 300 CAPSULE ORAL at 17:06

## 2021-06-28 RX ADMIN — HYDROXYCHLOROQUINE SULFATE 200 MG: 200 TABLET, FILM COATED ORAL at 17:06

## 2021-06-28 RX ADMIN — CARVEDILOL 25 MG: 25 TABLET, FILM COATED ORAL at 08:18

## 2021-06-28 RX ADMIN — GABAPENTIN 300 MG: 300 CAPSULE ORAL at 12:55

## 2021-06-28 RX ADMIN — Medication 1 SPRAY: at 08:17

## 2021-06-28 RX ADMIN — BACITRACIN 1 SMALL APPLICATION: 500 OINTMENT TOPICAL at 17:06

## 2021-06-28 RX ADMIN — ACETAMINOPHEN 975 MG: 325 TABLET, FILM COATED ORAL at 13:59

## 2021-06-28 RX ADMIN — OXYCODONE HYDROCHLORIDE 5 MG: 5 TABLET ORAL at 08:25

## 2021-06-28 RX ADMIN — BACITRACIN 1 SMALL APPLICATION: 500 OINTMENT TOPICAL at 08:18

## 2021-06-28 RX ADMIN — PANTOPRAZOLE SODIUM 40 MG: 40 TABLET, DELAYED RELEASE ORAL at 05:18

## 2021-06-28 RX ADMIN — HYDROXYCHLOROQUINE SULFATE 200 MG: 200 TABLET, FILM COATED ORAL at 08:19

## 2021-06-28 RX ADMIN — ACETAMINOPHEN 975 MG: 325 TABLET, FILM COATED ORAL at 05:18

## 2021-06-28 RX ADMIN — BUPROPION HYDROCHLORIDE 75 MG: 75 TABLET, FILM COATED ORAL at 17:06

## 2021-06-28 RX ADMIN — CHOLECALCIFEROL TAB 10 MCG (400 UNIT) 400 UNITS: 10 TAB at 08:18

## 2021-06-28 RX ADMIN — BUPROPION HYDROCHLORIDE 75 MG: 75 TABLET, FILM COATED ORAL at 08:18

## 2021-06-28 RX ADMIN — BUDESONIDE AND FORMOTEROL FUMARATE DIHYDRATE 2 PUFF: 80; 4.5 AEROSOL RESPIRATORY (INHALATION) at 08:17

## 2021-06-28 RX ADMIN — CARVEDILOL 25 MG: 25 TABLET, FILM COATED ORAL at 17:06

## 2021-06-28 NOTE — ASSESSMENT & PLAN NOTE
· Hgb 10 on admission, normocitic   According to chart review her baseline is 10-11  · Iron panel , vit b12 & folate are WNL  · Hemoglobin stable

## 2021-06-28 NOTE — DISCHARGE SUMMARY
[facility]  Discharge- Salome Spencer 1955, 78 y o  female MRN: [id number]  Unit/Bed#: E5 -01 Encounter: [id number]  Primary Care Provider: Roxane Jara DO   Date and time admitted to hospital: 6/25/2021  4:23 PM    No new Assessment & Plan notes have been filed under this hospital service since the last note was generated  Service: Hospitalist    Discharging Physician / Practitioner: Margreta Landau, MD  PCP: Roxane Jara DO  Admission Date:   Admission Orders (From admission, onward)     Ordered        06/25/21 2022  INPATIENT ADMISSION  Once                   Discharge Date: 06/28/21    Medical Problems     Resolved Problems  Date Reviewed: 6/28/2021        Resolved    LAILA (acute kidney injury) ([facility] ) 6/27/2021     Resolved by  Adrian Summers, [address] [place] [address] Stay:  · none    Procedures Performed:   · none    Significant Findings / Test Results:   · none    Incidental Findings:   · non     Test Results Pending at Discharge (will require follow up):   · none     Outpatient Tests Requested:  · none    Complications:  none    Reason for Admission:  Urinary tract infection    Hospital Course:     Salome Spencer is a 78 y o  female patient who originally presented to the hospital on 6/25/2021 due to acute confusion secondary to UTI and LAILA  She was treated with IV antibiotics significant improvement  Mental status went back to baseline  Treated with IV fluids with significant improvement resolution of LAILA  Will complete course of antibiotics with oral Keflex as urine culture grew pansensitive E coli  PT/OT evaluated patient recommended rehab however patient declined  Risks were discussed  Thus was discharged home PT/OT  Will follow-up PCP in 1 week        Please see above list of diagnoses and related plan for additional information       Condition at Discharge: stable     Discharge Day Visit / Exam:     * Please refer to separate progress note for these details *    Discussion with Family: pt    Discharge instructions/Information to patient and family:   See after visit summary for information provided to patient and family  Provisions for Follow-Up Care:  See after visit summary for information related to follow-up care and any pertinent home health orders  Disposition:     Home with VNA Services (Reminder: Complete face to face encounter)    For Discharges to [facility]:   · Not Applicable to this Patient - Not Applicable to this Patient    Planned Readmission: none     Discharge Statement:  I spent 40 minutes discharging the patient  This time was spent on the day of discharge  I had direct contact with the patient on the day of discharge  Greater than 50% of the total time was spent examining patient, answering all patient questions, arranging and discussing plan of care with patient as well as directly providing post-discharge instructions  Additional time then spent on discharge activities  Discharge Medications:  See after visit summary for reconciled discharge medications provided to patient and family        ** Please Note: This note has been constructed using a voice recognition system **

## 2021-06-28 NOTE — PROGRESS NOTES
[facility]  Progress Note - Lila Yanes 1955, 78 y o  female MRN: [id number]  Unit/Bed#: E5 -01 Encounter: [id number]  Primary Care Provider: Adrianne Ruff DO   Date and time admitted to hospital: 6/25/2021  4:23 PM    Anemia  Assessment & Plan  · Hgb 10 on admission, normocitic   According to chart review her baseline is 10-11  · Iron panel , vit b12 & folate are WNL  · Hemoglobin stable    CHF (congestive heart failure) (HCC)  Assessment & Plan  Wt Readings from Last 3 Encounters:   06/28/21 63 8 kg (140 lb 10 5 oz)   01/20/20 63 7 kg (140 lb 6 9 oz)   09/22/18 60 9 kg (134 lb 3 2 oz)       · NYHA 2 according to my chart review, did have EF 10 % back in 2004 when she first presented with non-ischemic cardiomyopathy  · Follows with cardiology as OP and on chart review they reported that the EF returned back to normal with coreg  · Continue coreg  · Pro bnp is elevated today 2,000 however patient seems very dry on exam  · CXR without acute pulmonary edema or pleural effusion, some venous congestion and cephalization of vessels  · Monitor fluid status   · I's and O's  · Daily weights      Mixed stress and urge urinary incontinence  Assessment & Plan  · Sees urogyn as OP  · Per my chart review does take Oxbutynin 5 mg daily for urinary incontinence  · Will continue to hold this for the time being since patient has uti and AMS  · I/O    Gastroesophageal reflux disease without esophagitis  Assessment & Plan  · Continue protonix 40 mg daily per chart review    Rheumatoid arthritis ([facility] [address] )  Assessment & Plan  · Continue plaquenil per chart review 200 mg b i d    Acute metabolic encephalopathy  Assessment & Plan  · Brother reports worsening over the last 2 weeks as he can tell via their phone conversations  · On admission, the patient was mumbling, babbling, trailing off in her sentences, disoriented  · Alert oriented x3 today, thought content normal on my exam  · Suspect multifactorial in the setting of poor p o  intake, dehydration, LAILA & UTI  · Takes many narcotic meds at home, manages them on her own  Unable to get an accurate idea of how patient actually takes her medications  · Managed by outpatient pain management  · Also medical marijuana-urine positive for opiates and THC  · Morphine initially held in the setting of LAILA, geriatric pain protocol  · Restart morphine as an outpatient  · Geriatrics has been consulted, appreciate recommendations  · H/o vit D deficiency , will supplement daily  · TSH recently was normal  · Ammonia is nl    Ambulatory dysfunction  Assessment & Plan  · Brother reports longstanding h/o falls, increasing over the last 2 months  · Last fall was yesterday  CT head without acute findings  C/o right hip pain, no acute fracture   · Would consider work up for chronic ischemic changes with MRI brain if mentation does not improve with treatment of UTI  · EKG NSR with PVCs and nonspecific T wave changes  · Orthostatics   · Geriatric consult  · PT/OT evaluation recommending rehab    Asthma  Assessment & Plan  · Not acutely exacerbated  · Continue substituting symbicort for advair   · Albuterol p r n  Chronic pain  Assessment & Plan  · Of the spine with frequent epidurals, sees pain management as OP  · According to chart review her pain management routine is  As follows:  · Morphine ER 15 mg b i d   · cymbalta 60 mg b i d    · Brother reports that patient has been slow to get to PT as an OP  · Will need rehab placement    * Acute cystitis without hematuria  Assessment & Plan  · POA, presented with weakness and AMS  · Found to have evidence of UTI on UA with positive nitrites, innumerable bacteria and moderate leukocytes  · Improved with IV rocephin initially  · Urine culture revealing pansensitive E coli  · Can transition to oral Keflex    VTE Pharmacologic Prophylaxis:   Pharmacologic: Heparin  Mechanical VTE Prophylaxis in Place: Yes    Patient Centered Rounds: I have performed bedside rounds with nursing staff today  Discussions with Specialists or Other Care Team Provider: cm, nursing    Education and Discussions with Family / Patient: pt    Time Spent for Care: 30 minutes  More than 50% of total time spent on counseling and coordination of care as described above  Current Length of Stay: 3 day(s)    Current Patient Status: Inpatient   Certification Statement: The patient will continue to require additional inpatient hospital stay due to Medically stable for discharge awaiting placement rehab    Discharge Plan:  Awaiting placement rehab    Code Status: Level 1 - Full Code      Subjective:   No acute complaints    Objective:     Vitals:   Temp (24hrs), Av °F (36 7 °C), Min:97 7 °F (36 5 °C), Max:98 2 °F (36 8 °C)    Temp:  [97 7 °F (36 5 °C)-98 2 °F (36 8 °C)] 97 7 °F (36 5 °C)  HR:  [60-85] 71  Resp:  [16-18] 17  BP: (126-145)/(68-75) 145/68  SpO2:  [92 %-98 %] 96 %  There is no height or weight on file to calculate BMI  Input and Output Summary (last 24 hours):     No intake or output data in the 24 hours ending 21 0841    Physical Exam:     Physical Exam  Constitutional:       General: She is not in acute distress  Appearance: She is well-developed  She is not diaphoretic  HENT:      Head: Normocephalic and atraumatic  Nose: Nose normal       Mouth/Throat:      Pharynx: No oropharyngeal exudate  Eyes:      General: No scleral icterus  Right eye: No discharge  Left eye: No discharge  Conjunctiva/sclera: Conjunctivae normal    Neck:      Thyroid: No thyromegaly  Vascular: No JVD  Cardiovascular:      Rate and Rhythm: Normal rate and regular rhythm  Heart sounds: Normal heart sounds  No murmur heard  No friction rub  No gallop  Pulmonary:      Effort: Pulmonary effort is normal  No respiratory distress  Breath sounds: Normal breath sounds  No wheezing or rales     Chest:      Chest wall: No tenderness  Abdominal:      General: Bowel sounds are normal  There is no distension  Palpations: Abdomen is soft  Tenderness: There is no abdominal tenderness  There is no guarding or rebound  Musculoskeletal:         General: No tenderness or deformity  Normal range of motion  Cervical back: Normal range of motion and neck supple  Skin:     General: Skin is warm and dry  Findings: No erythema or rash  Neurological:      Mental Status: She is alert  Mental status is at baseline  Cranial Nerves: No cranial nerve deficit  Sensory: No sensory deficit  Motor: No abnormal muscle tone  Coordination: Coordination normal          Additional Data:     Labs:    Results from last 7 days   Lab Units 06/28/21  0443 06/27/21  0457   WBC Thousand/uL 3 34* 3 41*   HEMOGLOBIN g/dL 10 1* 10 0*   HEMATOCRIT % 30 5* 30 9*   PLATELETS Thousands/uL 152 144*   NEUTROS PCT %  --  58   LYMPHS PCT %  --  30   MONOS PCT %  --  11   EOS PCT %  --  0     Results from last 7 days   Lab Units 06/28/21 0443 06/26/21  0346   SODIUM mmol/L 144 141   POTASSIUM mmol/L 3 1* 3 8   CHLORIDE mmol/L 111* 108   CO2 mmol/L 26 28   BUN mg/dL 8 21   CREATININE mg/dL 0 94 1 45*   ANION GAP mmol/L 7 5   CALCIUM mg/dL 9 4 9 5   ALBUMIN g/dL  --  2 6*   TOTAL BILIRUBIN mg/dL  --  0 33   ALK PHOS U/L  --  66   ALT U/L  --  13   AST U/L  --  17   GLUCOSE RANDOM mg/dL 91 80                           * I Have Reviewed All Lab Data Listed Above  * Additional Pertinent Lab Tests Reviewed:  All Labs Within Last 24 Hours Reviewed    Imaging:    Imaging Reports Reviewed Today Include: na  Imaging Personally Reviewed by Myself Includes:  na    Recent Cultures (last 7 days):     Results from last 7 days   Lab Units 06/25/21  1856   URINE CULTURE  >100,000 cfu/ml Escherichia coli*  10,000-19,000 cfu/ml Enterococcus faecalis*       Last 24 Hours Medication List:   Current Facility-Administered Medications   Medication Dose Route Frequency Provider Last Rate    acetaminophen  975 mg Oral [facility] Angela Demo, OWEN      bacitracin  1 small application Topical BID Jewell North PA-C      budesonide-formoterol  2 puff Inhalation BID [place], [place]      buPROPion  75 mg Oral BID [place], [place]      calcium carbonate  500 mg Oral TID PRN Rigoberto Naas, CRNP      carvedilol  25 mg Oral BID With Meals Angela Demo, PA-CHHAYA      cefTRIAXone  1,000 mg Intravenous Q24H Angela Demo, PA-C 1,000 mg (06/27/21 2033)    cholecalciferol  400 Units Oral Daily Angela Demo, PA-CHHAYA      gabapentin  300 mg Oral HS Angela Demo, PA-CHHAYA      heparin (porcine)  5,000 Units Subcutaneous [facility], [place]      HYDROmorphone  0 2 mg Intravenous Q4H PRN Angela Demo, PA-CHHAYA      hydroxychloroquine  200 mg Oral BID Angela Demo, PA-CHHAYA      lamoTRIgine  150 mg Oral HS Angela Demo, PA-CHHAYA      LORazepam  0 5 mg Intravenous HS PRN Angela Demo, PA-CHHAYA      naloxone  0 04 mg Intravenous Q1MIN PRN Angela Demo, PA-CHHAYA      ondansetron  4 mg Intravenous Q4H PRN Angela Demo, PA-CHHAYA      oxyCODONE  2 5 mg Oral Q4H PRN Angela Demo, PA-CHHAYA      oxyCODONE  5 mg Oral Q4H PRN Angela Demo, PA-CHHAYA      pantoprazole  40 mg Oral Early Morning Angela Demo, PA-CHHAYA      polyethylene glycol  17 g Oral Daily PRN Angela Demo, OWEN      senna  1 tablet Oral HS Jewell North PA-C      sodium chloride  1 spray Each Nare Q1H PRN Jewell North PA-C          Today, Patient Was Seen By: Jana Schneider MD    ** Please Note: Dictation voice to text software may have been used in the creation of this document   **

## 2021-06-28 NOTE — CASE MANAGEMENT
Patient requested a quad cane  A order was placed via [facility]  Ordered was approved and Liaison will deliver DME to patient's room  At this time there are no other CM needs

## 2021-06-28 NOTE — PLAN OF CARE
Problem: PHYSICAL THERAPY ADULT  Goal: Performs mobility at highest level of function for planned discharge setting  See evaluation for individualized goals  Outcome: Progressing  Note: Prognosis: Good  Problem List: Decreased strength, Decreased endurance, Impaired balance, Decreased mobility, Decreased cognition, Impaired judgement, Pain  Assessment: Pt seen for PT treatment session this date with interventions consisting of gait training w/ emphasis on improving pt's ability to ambulate level surfaces x 20 feet & 60 feet with seated rest break between with min A provided by therapist with RW and Therapeutic exercise consisting of: AROM 10-20 reps B LE in sitting position  Pt agreeable to PT treatment session upon arrival, pt found supine in bed w/ HOB elevated, in no apparent distress  In comparison to previous session, pt with improvements in amb distance, activity tolerance  Post session: pt returned back to recliner, chair alarm engaged and all needs in reach  Continue to recommend post acute rehabilitation services at time of d/c in order to maximize pt's functional independence and safety w/ mobility  Pt continues to be functioning below baseline level, and remains limited 2* factors listed above and including decreased strength, endurance & safe functional mobility  PT will continue to see pt during current hospitalization in order to address the deficits listed above and provide interventions consistent w/ POC in effort to achieve STGs  Barriers to Discharge: Inaccessible home environment, Decreased caregiver support  Barriers to Discharge Comments: stairs, home alone during day, hx of falls     PT Discharge Recommendation: Post acute rehabilitation services     PT - OK to Discharge: Yes (when med cleared to STR)    See flowsheet documentation for full assessment

## 2021-06-28 NOTE — CASE MANAGEMENT
Per ANDRES, patient medically stable and ready for discharge to STR  SW met with patient to discuss this matter  Patient's daughter was on speaker phone  Both patient and dtr do not want the patient to go to [facility] due to previous hx  They report that the patient did not have a good experience and they would prefer if she goes home with rehab and then follows-up outpatient  Patient's dtr is also wondering if the attending MD can re-schedule her MRI for her spine that was scheduled on June 22nd and the pt missed it  ANDRES has been notified  A referral was made to [facility] and patient was approved  Patient reports that her brother will transport her home today  IMM reviewed with patient  patient agree with discharge determination  IMM placed in scan bin  At this time there are no other CM needs

## 2021-06-28 NOTE — ASSESSMENT & PLAN NOTE
· Brother reports longstanding h/o falls, increasing over the last 2 months  · Last fall was yesterday  CT head without acute findings   C/o right hip pain, no acute fracture   · Would consider work up for chronic ischemic changes with MRI brain if mentation does not improve with treatment of UTI  · EKG NSR with PVCs and nonspecific T wave changes  · Orthostatics   · Geriatric consult  · PT/OT evaluation recommending rehab

## 2021-06-28 NOTE — ASSESSMENT & PLAN NOTE
· Of the spine with frequent epidurals, sees pain management as OP  · According to chart review her pain management routine is  As follows:  · Morphine ER 15 mg b i d   · cymbalta 60 mg b i d    · Brother reports that patient has been slow to get to PT as an OP  · Will need rehab placement

## 2021-06-28 NOTE — PHYSICAL THERAPY NOTE
06/28/21 1202   PT Last Visit   PT Visit Date 06/28/21   Note Type   Note Type Treatment   Pain Assessment   Pain Assessment Tool 0-10   Pain Score 6   Pain Location/Orientation Orientation: Bilateral;Location: Leg   Pain Onset/Description Onset: Ongoing; Descriptor: Aching   Effect of Pain on Daily Activities limits activity   Patient's Stated Pain Goal No pain   Hospital Pain Intervention(s) Medication (See MAR); Repositioned; Ambulation/increased activity; Rest   Multiple Pain Sites No   Restrictions/Precautions   Weight Bearing Precautions Per Order No   Other Precautions Cognitive; Chair Alarm; Bed Alarm;Multiple lines; Fall Risk;Pain   General   Chart Reviewed Yes   Family/Caregiver Present No   Cognition   Overall Cognitive Status Impaired   Arousal/Participation Alert; Cooperative   Attention Attends with cues to redirect   Orientation Level Oriented X4   Memory Decreased recall of precautions;Decreased recall of recent events;Decreased short term memory   Following Commands Follows one step commands without difficulty   Comments pt agreed to PT session, reports some issues with memory due to concussion   Subjective   Subjective "I'm supposed to go home later  I asked for a small based quad cane to go home because it's hard to get around in my house with the RW & I don't do so good with the regular cane "   Bed Mobility   Supine to Sit 5  Supervision   Additional items Assist x 1;HOB elevated; Bedrails; Increased time required;Verbal cues   Additional Comments pt sat OOB in chair to end session, all needs in reach, alarm on   Transfers   Sit to Stand 4  Minimal assistance   Additional items Assist x 1;Bedrails; Increased time required;Verbal cues   Stand to Sit 4  Minimal assistance   Additional items Assist x 1; Armrests; Increased time required;Verbal cues   Toilet transfer 4  Minimal assistance   Additional items Assist x 1; Increased time required;Verbal cues;Standard toilet  (grab bar)   Additional Comments vcs for safety & technique   Ambulation/Elevation   Gait pattern Decreased foot clearance;Narrow AYLA; Excessively slow; Short stride; Step to; Foward flexed   Gait Assistance 4  Minimal assist   Additional items Assist x 1;Verbal cues; Tactile cues   Assistive Device Rolling walker   Distance 20 feet & 60 feet with seated rest break between   Stair Management Assistance Not tested   Balance   Static Sitting Fair +   Dynamic Sitting Fair   Static Standing Fair -   Dynamic Standing Poor +   Ambulatory Poor +   Activity Tolerance   Activity Tolerance Patient limited by fatigue;Treatment limited secondary to medical complications (Comment)   Nurse Made Aware JULISA Garcia   Exercises   Hip Flexion Sitting;10 reps;AROM; Bilateral   Hip Abduction Sitting;10 reps;AROM; Bilateral   Hip Adduction Sitting;10 reps;AROM; Bilateral  (isometric)   Knee AROM Long Arc Quad Sitting;10 reps;AROM; Bilateral   Ankle Pumps Sitting;20 reps;AROM; Bilateral   Assessment   Prognosis Good   Problem List Decreased strength;Decreased endurance; Impaired balance;Decreased mobility; Decreased cognition; Impaired judgement;Pain   Assessment Pt seen for PT treatment session this date with interventions consisting of gait training w/ emphasis on improving pt's ability to ambulate level surfaces x 20 feet & 60 feet with seated rest break between with min A provided by therapist with RW and Therapeutic exercise consisting of: AROM 10-20 reps B LE in sitting position  Pt agreeable to PT treatment session upon arrival, pt found supine in bed w/ HOB elevated, in no apparent distress  In comparison to previous session, pt with improvements in amb distance, activity tolerance  Post session: pt returned back to recliner, chair alarm engaged and all needs in reach  Continue to recommend post acute rehabilitation services at time of d/c in order to maximize pt's functional independence and safety w/ mobility   Pt continues to be functioning below baseline level, and remains limited 2* factors listed above and including decreased strength, endurance & safe functional mobility  PT will continue to see pt during current hospitalization in order to address the deficits listed above and provide interventions consistent w/ POC in effort to achieve STGs  Barriers to Discharge Inaccessible home environment;Decreased caregiver support   Barriers to Discharge Comments stairs, home alone during day, hx of falls   Goals   Patient Goals to go home   PT Treatment Day 1   Plan   Treatment/Interventions Functional transfer training;LE strengthening/ROM; Elevations; Therapeutic exercise; Endurance training;Cognitive reorientation;Patient/family training;Equipment eval/education; Bed mobility;Gait training;Spoke to nursing   Progress Slow progress, decreased activity tolerance   PT Frequency   (3-5 x week)   Recommendation   PT Discharge Recommendation Post acute rehabilitation services   Equipment Recommended Mica Breath  (pt has RW at home)   PT - OK to Discharge Yes  (when med cleared to STR)   Thanh 8 in Bed Without Bedrails 4   Lying on Back to Sitting on Edge of Flat Bed 3   Moving Bed to Chair 3   Standing Up From Chair 3   Walk in Room 3   Climb 3-5 Stairs 2   Basic Mobility Inpatient Raw Score 18   Basic Mobility Standardized Score 41 05   The patient's AM-PAC Basic Mobility Inpatient Short Form Raw Score is 18, Standardized Score is 41 05  A standardized score less than 42 9 suggests the patient may benefit from discharge to post-acute rehabilitation services   Please also refer to the recommendation of the Physical Therapist for safe discharge planning as discussed with Physical Therapist   Laverda Landau, PTA

## 2021-06-28 NOTE — ASSESSMENT & PLAN NOTE
Wt Readings from Last 3 Encounters:   06/28/21 63 8 kg (140 lb 10 5 oz)   01/20/20 63 7 kg (140 lb 6 9 oz)   09/22/18 60 9 kg (134 lb 3 2 oz)       · NYHA 2 according to my chart review, did have EF 10 % back in 2004 when she first presented with non-ischemic cardiomyopathy  · Follows with cardiology as OP and on chart review they reported that the EF returned back to normal with coreg  · Continue coreg  · Pro bnp is elevated today 2,000 however patient seems very dry on exam  · CXR without acute pulmonary edema or pleural effusion, some venous congestion and cephalization of vessels  · Monitor fluid status   · I's and O's  · Daily weights

## 2021-06-29 LAB
DME PARACHUTE DELIVERY DATE ACTUAL: NORMAL
DME PARACHUTE DELIVERY DATE REQUESTED: NORMAL
DME PARACHUTE ITEM DESCRIPTION: NORMAL
DME PARACHUTE ORDER STATUS: NORMAL
DME PARACHUTE SUPPLIER NAME: NORMAL
DME PARACHUTE SUPPLIER PHONE: NORMAL

## 2021-07-01 ENCOUNTER — TELEPHONE (OUTPATIENT)
Dept: PHYSICAL THERAPY | Facility: OTHER | Age: 66
End: 2021-07-01

## 2021-07-01 NOTE — TELEPHONE ENCOUNTER
Voice mail/message left requesting patient to return call to [facility] including our hours of business and phone number  Kindly asked to LM with Full Name,  and Reminded CB may come from a non- number as the nurses are working remotely/off-site      Referral closed per protocol

## 2021-07-21 ENCOUNTER — HOSPITAL ENCOUNTER (EMERGENCY)
Facility: HOSPITAL | Age: 66
Discharge: HOME | End: 2021-07-21
Attending: EMERGENCY MEDICINE
Payer: MEDICARE

## 2021-07-21 ENCOUNTER — APPOINTMENT (EMERGENCY)
Dept: RADIOLOGY | Facility: HOSPITAL | Age: 66
End: 2021-07-21
Payer: MEDICARE

## 2021-07-21 VITALS
OXYGEN SATURATION: 98 % | SYSTOLIC BLOOD PRESSURE: 139 MMHG | TEMPERATURE: 98 F | HEART RATE: 83 BPM | DIASTOLIC BLOOD PRESSURE: 77 MMHG | WEIGHT: 128.97 LBS | RESPIRATION RATE: 16 BRPM

## 2021-07-21 DIAGNOSIS — M79.601 RIGHT ARM PAIN: Primary | ICD-10-CM

## 2021-07-21 DIAGNOSIS — S51.819A SKIN TEAR OF FOREARM WITHOUT COMPLICATION: ICD-10-CM

## 2021-07-21 PROCEDURE — 73080 X-RAY EXAM OF ELBOW: CPT

## 2021-07-21 PROCEDURE — 73090 X-RAY EXAM OF FOREARM: CPT

## 2021-07-21 PROCEDURE — 90471 IMMUNIZATION ADMIN: CPT

## 2021-07-21 PROCEDURE — 99284 EMERGENCY DEPT VISIT MOD MDM: CPT | Performed by: PHYSICIAN ASSISTANT

## 2021-07-21 PROCEDURE — 90715 TDAP VACCINE 7 YRS/> IM: CPT | Performed by: PHYSICIAN ASSISTANT

## 2021-07-21 PROCEDURE — 99283 EMERGENCY DEPT VISIT LOW MDM: CPT

## 2021-07-21 RX ORDER — IBUPROFEN 200 MG
TABLET ORAL 3 TIMES DAILY
Qty: 453.9 G | Refills: 0 | Status: SHIPPED | OUTPATIENT
Start: 2021-07-21

## 2021-07-21 RX ADMIN — TETANUS TOXOID, REDUCED DIPHTHERIA TOXOID AND ACELLULAR PERTUSSIS VACCINE, ADSORBED 0.5 ML: 5; 2.5; 8; 8; 2.5 SUSPENSION INTRAMUSCULAR at 18:32

## 2021-07-21 NOTE — ED PROVIDER NOTES
History  Chief Complaint   Patient presents with    Arm Pain     patient was walking down stairs and tirpped grabbed railing for support and twisted right arm, c/o right arm pain, shoulder pain, and skin tear noted to arm  Patient is a 79 y/o female, right hand dominant, presents to the ED for evaluation of arm injury  Pt states PTA she was walking down the stairs when she tripped, grabbed for railing for support and twisted her right arm, sustaining skin tear  Pt denies head injury or LOC  Pt with right elbow and forearm pain  Pt attempted to clean the wound at home  Pt reports bleeding controlled with pressure  Pt denies blood thinner use  Pt without fever, numbness/tingling, weakness  Prior to Admission Medications   Prescriptions Last Dose Informant Patient Reported? Taking? LORazepam (ATIVAN) 1 mg tablet   Yes No   Sig: Take 1 mg by mouth daily at bedtime   LamoTRIgine (LAMICTAL PO)   Yes No   Sig: Take 1 tablet by mouth daily at bedtime   buPROPion (WELLBUTRIN) 100 mg tablet   Yes No   Sig: Take 100 mg by mouth 2 (two) times a day   budesonide-formoterol (SYMBICORT) 80-4 5 MCG/ACT inhaler   No No   Sig: Inhale 2 puffs 2 (two) times a day Rinse mouth after use     carvedilol (COREG) 25 mg tablet   Yes No   Sig: Take 25 mg by mouth 2 (two) times a day with meals   gabapentin (NEURONTIN) 300 mg capsule   No No   Sig: Take 1 capsule (300 mg total) by mouth 2 (two) times a day   hydroxychloroquine (PLAQUENIL) 200 mg tablet   No No   Sig: Take 1 tablet (200 mg total) by mouth 2 (two) times a day   morphine (MS CONTIN) 15 mg 12 hr tablet   Yes No   Sig: Take 15 mg by mouth 2 (two) times a day   pantoprazole (PROTONIX) 40 mg tablet   No No   Sig: Take 1 tablet (40 mg total) by mouth daily in the early morning   polyethylene glycol (MIRALAX) 17 g packet   No No   Sig: Take 17 g by mouth daily as needed (constipation)      Facility-Administered Medications: None       Past Medical History:   Diagnosis Date    Anxiety     Asthma     Chronic pain     Depression     Enterovirus heart infection        Past Surgical History:   Procedure Laterality Date    FIXATION KYPHOPLASTY LUMBAR SPINE      HYSTERECTOMY      REPAIR RECTOCELE         History reviewed  No pertinent family history  I have reviewed and agree with the history as documented  E-Cigarette/Vaping    E-Cigarette Use Never User      E-Cigarette/Vaping Substances    Nicotine No     THC No     CBD No     Flavoring No     Other No     Unknown No      Social History     Tobacco Use    Smoking status: Never Smoker    Smokeless tobacco: Never Used   Vaping Use    Vaping Use: Never used   Substance Use Topics    Alcohol use: Yes     Comment: social    Drug use: No       Review of Systems   Musculoskeletal:        Right arm injury   Skin: Positive for wound  All other systems reviewed and are negative  Physical Exam  Physical Exam  Constitutional:       Appearance: Normal appearance  HENT:      Head: Normocephalic and atraumatic  Right Ear: External ear normal       Left Ear: External ear normal       Nose: Nose normal       Mouth/Throat:      Lips: Pink  Mouth: Mucous membranes are moist    Eyes:      Extraocular Movements: Extraocular movements intact  Conjunctiva/sclera: Conjunctivae normal    Pulmonary:      Effort: No tachypnea or respiratory distress  Musculoskeletal:        Arms:       Cervical back: Normal range of motion and neck supple  Comments: Neurovascularly intact distally  5/5 strength bilateral upper extremities  Radial pulse 2 +  Cap refill <2 seconds  Sensation intact  Skin:     General: Skin is warm  Capillary Refill: Capillary refill takes less than 2 seconds  Neurological:      Mental Status: She is alert and oriented to person, place, and time  GCS: GCS eye subscore is 4  GCS verbal subscore is 5  GCS motor subscore is 6     Psychiatric:         Mood and Affect: Mood and affect normal          Speech: Speech normal          Vital Signs  ED Triage Vitals [07/21/21 1653]   Temperature Pulse Respirations Blood Pressure SpO2   98 °F (36 7 °C) 83 16 139/77 98 %      Temp Source Heart Rate Source Patient Position - Orthostatic VS BP Location FiO2 (%)   Oral Monitor Sitting Right arm --      Pain Score       --           Vitals:    07/21/21 1653   BP: 139/77   Pulse: 83   Patient Position - Orthostatic VS: Sitting         Visual Acuity      ED Medications  Medications   tetanus-diphtheria-acellular pertussis (BOOSTRIX) IM injection 0 5 mL (0 5 mL Intramuscular Given 7/21/21 1832)       Diagnostic Studies  Results Reviewed     None                 XR elbow 3+ views RIGHT   ED Interpretation by Neto Keenan PA-C (07/21 1856)   No acute osseous abnormality seen by me          XR forearm 2 views RIGHT   ED Interpretation by Neto Keenan PA-C (07/21 1856)   No acute osseous abnormality seen by me                     Procedures  Procedures         ED Course                             SBIRT 20yo+      Most Recent Value   SBIRT (23 yo +)   In order to provide better care to our patients, we are screening all of our patients for alcohol and drug use  Would it be okay to ask you these screening questions? No Filed at: 07/21/2021 1835   Initial Alcohol Screen: US AUDIT-C    1  How often do you have a drink containing alcohol?  0 Filed at: 07/21/2021 1835   2  How many drinks containing alcohol do you have on a typical day you are drinking? 0 Filed at: 07/21/2021 1835   3a  Male UNDER 65: How often do you have five or more drinks on one occasion? 0 Filed at: 07/21/2021 1835   3b  FEMALE Any Age, or MALE 65+: How often do you have 4 or more drinks on one occassion?   0 Filed at: 07/21/2021 1835   Audit-C Score  0 Filed at: 07/21/2021 1835                    MDM  Number of Diagnoses or Management Options  Right arm pain: new and does not require workup  Skin tear of forearm without complication: new and does not require workup  Diagnosis management comments: Patient is a 77 y/o female, right hand dominant, presents to the ED for evaluation of arm injury  Pt states PTA she was walking down the stairs when she tripped, grabbed for railing for support and twisted her right arm, sustaining skin tear  Pt denies head injury or LOC  Pt with right elbow and forearm pain    X-ray right elbow and forearm show no acute osseous abnormality seen by me, however the film will be reviewed by a radiologist   I informed the patient of this and if there is any discrepancy, the patient will be contacted  Tetanus updated  Wound cleaned and dressed  Recommended f/u with PCP in 2 days for wound re-check  Keep arm elevated, use ice and take motrin/tylenol for pain reduction  Patient verbalizes understanding and agrees with plan  The management plan was discussed in detail with the patient at bedside and all questions were answered  Prior to discharge, I provided both verbal and written instructions  I discussed with the patient the signs and symptoms for which to return to the emergency department  All questions were answered and patient was comfortable with the plan of care and discharged to home  The patient agrees to return to the Emergency Department for concerns and/or progression of illness  Disposition  Final diagnoses:   Right arm pain   Skin tear of forearm without complication     Time reflects when diagnosis was documented in both MDM as applicable and the Disposition within this note     Time User Action Codes Description Comment    7/21/2021  6:56 PM Felipa Ocampo Add [M79 601] Right arm pain     7/21/2021  6:56 PM Felipa Ocampo Add [W52 [id number]] Skin tear of forearm without complication       ED Disposition     ED Disposition Condition Date/Time Comment    Discharge Stable Wed Jul 21, 2021  6:56 PM Gurmeet Gonzalez discharge to home/self care              Follow-up Information     Follow up With Specialties Details Why Contact Info    Megan Harry [place] [address], DO Family Medicine Schedule an appointment as soon as possible for a visit in 2 days For wound re-check [place] [id number]  [place] [address]  [phone]            Patient's Medications   Discharge Prescriptions    NEOMYCIN-BACITRACIN-POLYMYXIN (NEOSPORIN) 5-400-5,000 OINTMENT    Apply topically 3 (three) times a day       Start Date: 7/21/2021 End Date: --       Order Dose: --       Quantity: 453 9 g    Refills: 0     No discharge procedures on file      PDMP Review     None          ED Provider  Electronically Signed by           Holly Campos PA-C  07/21/21 6214

## 2021-12-29 PROBLEM — M47.816 LUMBAR SPONDYLOSIS: Status: ACTIVE | Noted: 2021-12-29

## 2021-12-29 PROBLEM — M81.0 SENILE OSTEOPOROSIS: Status: ACTIVE | Noted: 2021-12-29

## 2021-12-29 PROBLEM — M35.9 UNDIFFERENTIATED CONNECTIVE TISSUE DISEASE (HCC): Status: ACTIVE | Noted: 2021-12-29

## 2021-12-29 PROBLEM — Z87.81 HX OF COMPRESSION FRACTURE OF SPINE: Status: ACTIVE | Noted: 2021-12-29

## 2021-12-29 PROBLEM — M15.0 PRIMARY GENERALIZED (OSTEO)ARTHRITIS: Status: ACTIVE | Noted: 2021-12-29

## 2021-12-29 PROBLEM — G62.9 PERIPHERAL NEUROPATHY: Status: ACTIVE | Noted: 2021-12-29

## 2021-12-29 PROBLEM — M47.812 CERVICAL SPONDYLOSIS: Status: ACTIVE | Noted: 2021-12-29

## 2022-03-01 ENCOUNTER — APPOINTMENT (EMERGENCY)
Dept: CT IMAGING | Facility: HOSPITAL | Age: 67
DRG: 964 | End: 2022-03-01
Payer: MEDICARE

## 2022-03-01 ENCOUNTER — HOSPITAL ENCOUNTER (EMERGENCY)
Facility: HOSPITAL | Age: 67
DRG: 964 | End: 2022-03-01
Attending: EMERGENCY MEDICINE
Payer: MEDICARE

## 2022-03-01 ENCOUNTER — HOSPITAL ENCOUNTER (INPATIENT)
Facility: HOSPITAL | Age: 67
LOS: 7 days | Discharge: NURSING, REHAB OR LONG-TERM CARE | DRG: 964 | End: 2022-03-08
Attending: SURGERY | Admitting: SURGERY
Payer: MEDICARE

## 2022-03-01 VITALS
HEART RATE: 93 BPM | DIASTOLIC BLOOD PRESSURE: 69 MMHG | RESPIRATION RATE: 20 BRPM | WEIGHT: 128 LBS | HEIGHT: 64 IN | BODY MASS INDEX: 21.85 KG/M2 | OXYGEN SATURATION: 99 % | TEMPERATURE: 97.6 F | SYSTOLIC BLOOD PRESSURE: 139 MMHG

## 2022-03-01 DIAGNOSIS — S22.41XA CLOSED FRACTURE OF MULTIPLE RIBS OF RIGHT SIDE, INITIAL ENCOUNTER: ICD-10-CM
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
## 2022-03-01 DIAGNOSIS — S32.009A CLOSED FRACTURE OF TRANSVERSE PROCESS OF LUMBAR VERTEBRA, INITIAL ENCOUNTER (HCC): Primary | ICD-10-CM

## 2022-03-01 DIAGNOSIS — S22.39XA RIB FRACTURE: ICD-10-CM

## 2022-03-01 DIAGNOSIS — G89.11 ACUTE PAIN DUE TO TRAUMA: ICD-10-CM

## 2022-03-01 DIAGNOSIS — J93.9 PNEUMOTHORAX: ICD-10-CM

## 2022-03-01 LAB
ANION GAP SERPL CALCULATED.3IONS-SCNC: 9 MMOL/L (ref 4–13)
APTT PPP: 28 SECONDS (ref 23–37)
BASOPHILS # BLD AUTO: 0.02 THOUSANDS/ΜL (ref 0–0.1)
BASOPHILS NFR BLD AUTO: 0 % (ref 0–1)
BUN SERPL-MCNC: 11 MG/DL (ref 5–25)
CALCIUM SERPL-MCNC: 10.1 MG/DL (ref 8.3–10.1)
CHLORIDE SERPL-SCNC: 103 MMOL/L (ref 100–108)
CO2 SERPL-SCNC: 26 MMOL/L (ref 21–32)
CREAT SERPL-MCNC: 1.12 MG/DL (ref 0.6–1.3)
EOSINOPHIL # BLD AUTO: 0.01 THOUSAND/ΜL (ref 0–0.61)
EOSINOPHIL NFR BLD AUTO: 0 % (ref 0–6)
ERYTHROCYTE [DISTWIDTH] IN BLOOD BY AUTOMATED COUNT: 12.8 % (ref 11.6–15.1)
GFR SERPL CREATININE-BSD FRML MDRD: 50 ML/MIN/1.73SQ M
GLUCOSE SERPL-MCNC: 77 MG/DL (ref 65–140)
HCT VFR BLD AUTO: 37 % (ref 34.8–46.1)
HGB BLD-MCNC: 12.2 G/DL (ref 11.5–15.4)
IMM GRANULOCYTES # BLD AUTO: 0.01 THOUSAND/UL (ref 0–0.2)
IMM GRANULOCYTES NFR BLD AUTO: 0 % (ref 0–2)
INR PPP: 1.03 (ref 0.84–1.19)
LYMPHOCYTES # BLD AUTO: 1.03 THOUSANDS/ΜL (ref 0.6–4.47)
LYMPHOCYTES NFR BLD AUTO: 18 % (ref 14–44)
MCH RBC QN AUTO: 29.8 PG (ref 26.8–34.3)
MCHC RBC AUTO-ENTMCNC: 33 G/DL (ref 31.4–37.4)
MCV RBC AUTO: 90 FL (ref 82–98)
MONOCYTES # BLD AUTO: 0.44 THOUSAND/ΜL (ref 0.17–1.22)
MONOCYTES NFR BLD AUTO: 8 % (ref 4–12)
NEUTROPHILS # BLD AUTO: 4.32 THOUSANDS/ΜL (ref 1.85–7.62)
NEUTS SEG NFR BLD AUTO: 74 % (ref 43–75)
NRBC BLD AUTO-RTO: 0 /100 WBCS
PLATELET # BLD AUTO: 182 THOUSANDS/UL (ref 149–390)
PMV BLD AUTO: 9.9 FL (ref 8.9–12.7)
POTASSIUM SERPL-SCNC: 4.2 MMOL/L (ref 3.5–5.3)
PROTHROMBIN TIME: 13.2 SECONDS (ref 11.6–14.5)
RBC # BLD AUTO: 4.1 MILLION/UL (ref 3.81–5.12)
SODIUM SERPL-SCNC: 138 MMOL/L (ref 136–145)
WBC # BLD AUTO: 5.83 THOUSAND/UL (ref 4.31–10.16)

## 2022-03-01 PROCEDURE — 96376 TX/PRO/DX INJ SAME DRUG ADON: CPT

## 2022-03-01 PROCEDURE — 80048 BASIC METABOLIC PNL TOTAL CA: CPT | Performed by: PHYSICIAN ASSISTANT

## 2022-03-01 PROCEDURE — 71260 CT THORAX DX C+: CPT

## 2022-03-01 PROCEDURE — 70450 CT HEAD/BRAIN W/O DYE: CPT

## 2022-03-01 PROCEDURE — 72125 CT NECK SPINE W/O DYE: CPT

## 2022-03-01 PROCEDURE — 85610 PROTHROMBIN TIME: CPT | Performed by: PHYSICIAN ASSISTANT

## 2022-03-01 PROCEDURE — 85025 COMPLETE CBC W/AUTO DIFF WBC: CPT | Performed by: PHYSICIAN ASSISTANT

## 2022-03-01 PROCEDURE — 85730 THROMBOPLASTIN TIME PARTIAL: CPT | Performed by: PHYSICIAN ASSISTANT

## 2022-03-01 PROCEDURE — 96375 TX/PRO/DX INJ NEW DRUG ADDON: CPT

## 2022-03-01 PROCEDURE — 99285 EMERGENCY DEPT VISIT HI MDM: CPT | Performed by: PHYSICIAN ASSISTANT

## 2022-03-01 PROCEDURE — 99285 EMERGENCY DEPT VISIT HI MDM: CPT

## 2022-03-01 PROCEDURE — 96374 THER/PROPH/DIAG INJ IV PUSH: CPT

## 2022-03-01 PROCEDURE — 36415 COLL VENOUS BLD VENIPUNCTURE: CPT | Performed by: PHYSICIAN ASSISTANT

## 2022-03-01 PROCEDURE — 96361 HYDRATE IV INFUSION ADD-ON: CPT

## 2022-03-01 PROCEDURE — 74177 CT ABD & PELVIS W/CONTRAST: CPT

## 2022-03-01 RX ORDER — HYDROMORPHONE HCL/PF 1 MG/ML
1 SYRINGE (ML) INJECTION ONCE
Status: COMPLETED | OUTPATIENT
Start: 2022-03-01 | End: 2022-03-01

## 2022-03-01 RX ORDER — FENTANYL CITRATE 50 UG/ML
25 INJECTION, SOLUTION INTRAMUSCULAR; INTRAVENOUS ONCE
Status: COMPLETED | OUTPATIENT
Start: 2022-03-01 | End: 2022-03-01

## 2022-03-01 RX ORDER — HYDROMORPHONE HCL/PF 1 MG/ML
0.5 SYRINGE (ML) INJECTION ONCE
Status: COMPLETED | OUTPATIENT
Start: 2022-03-01 | End: 2022-03-01

## 2022-03-01 RX ORDER — FENTANYL CITRATE 50 UG/ML
25 INJECTION, SOLUTION INTRAMUSCULAR; INTRAVENOUS ONCE
Status: DISCONTINUED | OUTPATIENT
Start: 2022-03-01 | End: 2022-03-01

## 2022-03-01 RX ADMIN — SODIUM CHLORIDE 500 ML: 0.9 INJECTION, SOLUTION INTRAVENOUS at 19:29

## 2022-03-01 RX ADMIN — IOHEXOL 100 ML: 350 INJECTION, SOLUTION INTRAVENOUS at 20:50

## 2022-03-01 RX ADMIN — HYDROMORPHONE HYDROCHLORIDE 1 MG: 1 INJECTION, SOLUTION INTRAMUSCULAR; INTRAVENOUS; SUBCUTANEOUS at 21:03

## 2022-03-01 RX ADMIN — FENTANYL CITRATE 25 MCG: 50 INJECTION, SOLUTION INTRAMUSCULAR; INTRAVENOUS at 19:31

## 2022-03-01 RX ADMIN — HYDROMORPHONE HYDROCHLORIDE 0.5 MG: 1 INJECTION, SOLUTION INTRAMUSCULAR; INTRAVENOUS; SUBCUTANEOUS at 22:49

## 2022-03-02 ENCOUNTER — APPOINTMENT (INPATIENT)
Dept: RADIOLOGY | Facility: HOSPITAL | Age: 67
DRG: 964 | End: 2022-03-02
Payer: MEDICARE

## 2022-03-02 ENCOUNTER — TELEPHONE (OUTPATIENT)
Dept: RADIOLOGY | Facility: HOSPITAL | Age: 67
End: 2022-03-02

## 2022-03-02 PROBLEM — S36.112A LIVER CONTUSION: Status: ACTIVE | Noted: 2022-03-02

## 2022-03-02 PROBLEM — S27.0XXA TRAUMATIC PNEUMOTHORAX: Status: ACTIVE | Noted: 2022-03-02

## 2022-03-02 PROBLEM — E43 SEVERE PROTEIN-CALORIE MALNUTRITION (HCC): Status: ACTIVE | Noted: 2022-03-02

## 2022-03-02 PROBLEM — S32.009A CLOSED FRACTURE OF TRANSVERSE PROCESS OF LUMBAR VERTEBRA (HCC): Status: ACTIVE | Noted: 2022-03-02
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
## 2022-03-02 PROBLEM — S22.41XA CLOSED FRACTURE OF MULTIPLE RIBS OF RIGHT SIDE: Status: ACTIVE | Noted: 2022-03-02

## 2022-03-02 PROBLEM — M25.511 RIGHT SHOULDER PAIN: Status: ACTIVE | Noted: 2022-03-02

## 2022-03-02 PROBLEM — G89.11 ACUTE PAIN DUE TO TRAUMA: Status: ACTIVE | Noted: 2022-03-02

## 2022-03-02 LAB
ABO GROUP BLD: NORMAL
ABO GROUP BLD: NORMAL
ANION GAP SERPL CALCULATED.3IONS-SCNC: 5 MMOL/L (ref 4–13)
APTT PPP: 29 SECONDS (ref 23–37)
BASOPHILS # BLD AUTO: 0.03 THOUSANDS/ΜL (ref 0–0.1)
BASOPHILS NFR BLD AUTO: 1 % (ref 0–1)
BLD GP AB SCN SERPL QL: NEGATIVE
BUN SERPL-MCNC: 13 MG/DL (ref 5–25)
CALCIUM SERPL-MCNC: 9.6 MG/DL (ref 8.3–10.1)
CHLORIDE SERPL-SCNC: 107 MMOL/L (ref 100–108)
CO2 SERPL-SCNC: 25 MMOL/L (ref 21–32)
CREAT SERPL-MCNC: 0.93 MG/DL (ref 0.6–1.3)
EOSINOPHIL # BLD AUTO: 0 THOUSAND/ΜL (ref 0–0.61)
EOSINOPHIL NFR BLD AUTO: 0 % (ref 0–6)
ERYTHROCYTE [DISTWIDTH] IN BLOOD BY AUTOMATED COUNT: 13.2 % (ref 11.6–15.1)
GFR SERPL CREATININE-BSD FRML MDRD: 63 ML/MIN/1.73SQ M
GLUCOSE SERPL-MCNC: 151 MG/DL (ref 65–140)
GLUCOSE SERPL-MCNC: 38 MG/DL (ref 65–140)
GLUCOSE SERPL-MCNC: 52 MG/DL (ref 65–140)
HCT VFR BLD AUTO: 31.8 % (ref 34.8–46.1)
HCT VFR BLD AUTO: 33 % (ref 34.8–46.1)
HGB BLD-MCNC: 10.5 G/DL (ref 11.5–15.4)
HGB BLD-MCNC: 11.6 G/DL (ref 11.5–15.4)
IMM GRANULOCYTES # BLD AUTO: 0.03 THOUSAND/UL (ref 0–0.2)
IMM GRANULOCYTES NFR BLD AUTO: 1 % (ref 0–2)
INR PPP: 1.07 (ref 0.84–1.19)
LYMPHOCYTES # BLD AUTO: 1.43 THOUSANDS/ΜL (ref 0.6–4.47)
LYMPHOCYTES NFR BLD AUTO: 31 % (ref 14–44)
MAGNESIUM SERPL-MCNC: 1.6 MG/DL (ref 1.6–2.6)
MCH RBC QN AUTO: 29.9 PG (ref 26.8–34.3)
MCHC RBC AUTO-ENTMCNC: 33 G/DL (ref 31.4–37.4)
MCV RBC AUTO: 91 FL (ref 82–98)
MONOCYTES # BLD AUTO: 0.48 THOUSAND/ΜL (ref 0.17–1.22)
MONOCYTES NFR BLD AUTO: 10 % (ref 4–12)
NEUTROPHILS # BLD AUTO: 2.64 THOUSANDS/ΜL (ref 1.85–7.62)
NEUTS SEG NFR BLD AUTO: 57 % (ref 43–75)
NRBC BLD AUTO-RTO: 0 /100 WBCS
PLATELET # BLD AUTO: 162 THOUSANDS/UL (ref 149–390)
PLATELET # BLD AUTO: 171 THOUSANDS/UL (ref 149–390)
PMV BLD AUTO: 10 FL (ref 8.9–12.7)
PMV BLD AUTO: 10.4 FL (ref 8.9–12.7)
POTASSIUM SERPL-SCNC: 4 MMOL/L (ref 3.5–5.3)
PROTHROMBIN TIME: 13.5 SECONDS (ref 11.6–14.5)
RBC # BLD AUTO: 3.51 MILLION/UL (ref 3.81–5.12)
RH BLD: NEGATIVE
RH BLD: NEGATIVE
SODIUM SERPL-SCNC: 137 MMOL/L (ref 136–145)
SPECIMEN EXPIRATION DATE: NORMAL
WBC # BLD AUTO: 4.61 THOUSAND/UL (ref 4.31–10.16)

## 2022-03-02 PROCEDURE — 86850 RBC ANTIBODY SCREEN: CPT | Performed by: SURGERY

## 2022-03-02 PROCEDURE — 85049 AUTOMATED PLATELET COUNT: CPT | Performed by: SURGERY

## 2022-03-02 PROCEDURE — 85610 PROTHROMBIN TIME: CPT | Performed by: SURGERY

## 2022-03-02 PROCEDURE — 83735 ASSAY OF MAGNESIUM: CPT | Performed by: SURGERY

## 2022-03-02 PROCEDURE — 86901 BLOOD TYPING SEROLOGIC RH(D): CPT | Performed by: SURGERY

## 2022-03-02 PROCEDURE — 97163 PT EVAL HIGH COMPLEX 45 MIN: CPT

## 2022-03-02 PROCEDURE — 99232 SBSQ HOSP IP/OBS MODERATE 35: CPT | Performed by: SURGERY

## 2022-03-02 PROCEDURE — 85014 HEMATOCRIT: CPT | Performed by: PHYSICIAN ASSISTANT

## 2022-03-02 PROCEDURE — 82948 REAGENT STRIP/BLOOD GLUCOSE: CPT

## 2022-03-02 PROCEDURE — 36415 COLL VENOUS BLD VENIPUNCTURE: CPT | Performed by: SURGERY

## 2022-03-02 PROCEDURE — 97167 OT EVAL HIGH COMPLEX 60 MIN: CPT

## 2022-03-02 PROCEDURE — 86900 BLOOD TYPING SEROLOGIC ABO: CPT | Performed by: SURGERY

## 2022-03-02 PROCEDURE — 71046 X-RAY EXAM CHEST 2 VIEWS: CPT

## 2022-03-02 PROCEDURE — 73030 X-RAY EXAM OF SHOULDER: CPT

## 2022-03-02 PROCEDURE — NC001 PR NO CHARGE: Performed by: SURGERY

## 2022-03-02 PROCEDURE — 85018 HEMOGLOBIN: CPT | Performed by: PHYSICIAN ASSISTANT

## 2022-03-02 PROCEDURE — 80048 BASIC METABOLIC PNL TOTAL CA: CPT | Performed by: SURGERY

## 2022-03-02 PROCEDURE — 85025 COMPLETE CBC W/AUTO DIFF WBC: CPT | Performed by: SURGERY

## 2022-03-02 PROCEDURE — 85730 THROMBOPLASTIN TIME PARTIAL: CPT | Performed by: SURGERY

## 2022-03-02 RX ORDER — OXYCODONE HYDROCHLORIDE 5 MG/1
5 TABLET ORAL EVERY 6 HOURS PRN
Status: DISCONTINUED | OUTPATIENT
Start: 2022-03-02 | End: 2022-03-02

## 2022-03-02 RX ORDER — OXYCODONE HYDROCHLORIDE 10 MG/1
10 TABLET ORAL EVERY 4 HOURS PRN
Status: DISCONTINUED | OUTPATIENT
Start: 2022-03-02 | End: 2022-03-08 | Stop reason: HOSPADM

## 2022-03-02 RX ORDER — ACETAMINOPHEN 325 MG/1
975 TABLET ORAL EVERY 8 HOURS SCHEDULED
Status: DISCONTINUED | OUTPATIENT
Start: 2022-03-02 | End: 2022-03-08 | Stop reason: HOSPADM

## 2022-03-02 RX ORDER — DEXTROSE MONOHYDRATE 25 G/50ML
25 INJECTION, SOLUTION INTRAVENOUS ONCE
Status: DISCONTINUED | OUTPATIENT
Start: 2022-03-02 | End: 2022-03-02

## 2022-03-02 RX ORDER — HYDROMORPHONE HCL/PF 1 MG/ML
0.5 SYRINGE (ML) INJECTION EVERY 4 HOURS PRN
Status: DISCONTINUED | OUTPATIENT
Start: 2022-03-02 | End: 2022-03-07

## 2022-03-02 RX ORDER — MORPHINE SULFATE 15 MG/1
15 TABLET, FILM COATED, EXTENDED RELEASE ORAL EVERY 12 HOURS SCHEDULED
Status: DISCONTINUED | OUTPATIENT
Start: 2022-03-02 | End: 2022-03-08 | Stop reason: HOSPADM

## 2022-03-02 RX ORDER — OXYCODONE HYDROCHLORIDE 5 MG/1
5 TABLET ORAL EVERY 4 HOURS PRN
Status: DISCONTINUED | OUTPATIENT
Start: 2022-03-02 | End: 2022-03-08 | Stop reason: HOSPADM

## 2022-03-02 RX ORDER — HEPARIN SODIUM 5000 [USP'U]/ML
5000 INJECTION, SOLUTION INTRAVENOUS; SUBCUTANEOUS EVERY 8 HOURS SCHEDULED
Status: COMPLETED | OUTPATIENT
Start: 2022-03-02 | End: 2022-03-02

## 2022-03-02 RX ORDER — ONDANSETRON 2 MG/ML
4 INJECTION INTRAMUSCULAR; INTRAVENOUS EVERY 6 HOURS PRN
Status: DISCONTINUED | OUTPATIENT
Start: 2022-03-02 | End: 2022-03-08 | Stop reason: HOSPADM

## 2022-03-02 RX ORDER — DOCUSATE SODIUM 100 MG/1
100 CAPSULE, LIQUID FILLED ORAL 2 TIMES DAILY
Status: DISCONTINUED | OUTPATIENT
Start: 2022-03-02 | End: 2022-03-07

## 2022-03-02 RX ORDER — SODIUM CHLORIDE, SODIUM LACTATE, POTASSIUM CHLORIDE, CALCIUM CHLORIDE 600; 310; 30; 20 MG/100ML; MG/100ML; MG/100ML; MG/100ML
100 INJECTION, SOLUTION INTRAVENOUS CONTINUOUS
Status: DISCONTINUED | OUTPATIENT
Start: 2022-03-02 | End: 2022-03-02

## 2022-03-02 RX ORDER — GABAPENTIN 100 MG/1
200 CAPSULE ORAL 3 TIMES DAILY
Status: DISCONTINUED | OUTPATIENT
Start: 2022-03-02 | End: 2022-03-08 | Stop reason: HOSPADM

## 2022-03-02 RX ORDER — OXYCODONE HYDROCHLORIDE 10 MG/1
10 TABLET ORAL EVERY 6 HOURS PRN
Status: DISCONTINUED | OUTPATIENT
Start: 2022-03-02 | End: 2022-03-02

## 2022-03-02 RX ORDER — LIDOCAINE 50 MG/G
1 PATCH TOPICAL DAILY
Status: DISCONTINUED | OUTPATIENT
Start: 2022-03-02 | End: 2022-03-08 | Stop reason: HOSPADM

## 2022-03-02 RX ORDER — ALBUTEROL SULFATE 2.5 MG/3ML
2.5 SOLUTION RESPIRATORY (INHALATION) EVERY 6 HOURS PRN
Status: DISCONTINUED | OUTPATIENT
Start: 2022-03-02 | End: 2022-03-04

## 2022-03-02 RX ORDER — HEPARIN SODIUM 5000 [USP'U]/ML
5000 INJECTION, SOLUTION INTRAVENOUS; SUBCUTANEOUS EVERY 8 HOURS SCHEDULED
Status: DISCONTINUED | OUTPATIENT
Start: 2022-03-02 | End: 2022-03-02

## 2022-03-02 RX ORDER — ECHINACEA PURPUREA EXTRACT 125 MG
1 TABLET ORAL EVERY 2 HOUR PRN
Status: DISCONTINUED | OUTPATIENT
Start: 2022-03-02 | End: 2022-03-08 | Stop reason: HOSPADM

## 2022-03-02 RX ORDER — METHOCARBAMOL 500 MG/1
500 TABLET, FILM COATED ORAL EVERY 6 HOURS SCHEDULED
Status: DISCONTINUED | OUTPATIENT
Start: 2022-03-02 | End: 2022-03-08 | Stop reason: HOSPADM

## 2022-03-02 RX ORDER — SENNOSIDES 8.6 MG
2 TABLET ORAL DAILY
Status: DISCONTINUED | OUTPATIENT
Start: 2022-03-02 | End: 2022-03-05

## 2022-03-02 RX ADMIN — METHOCARBAMOL 500 MG: 500 TABLET ORAL at 00:44

## 2022-03-02 RX ADMIN — GABAPENTIN 200 MG: 100 CAPSULE ORAL at 16:58

## 2022-03-02 RX ADMIN — LIDOCAINE 5% 1 PATCH: 700 PATCH TOPICAL at 08:31

## 2022-03-02 RX ADMIN — ACETAMINOPHEN 975 MG: 325 TABLET ORAL at 22:36

## 2022-03-02 RX ADMIN — OXYCODONE HYDROCHLORIDE 10 MG: 10 TABLET ORAL at 02:47

## 2022-03-02 RX ADMIN — MORPHINE SULFATE 15 MG: 15 TABLET, FILM COATED, EXTENDED RELEASE ORAL at 16:58

## 2022-03-02 RX ADMIN — HEPARIN SODIUM 5000 UNITS: 5000 INJECTION INTRAVENOUS; SUBCUTANEOUS at 13:02

## 2022-03-02 RX ADMIN — METHOCARBAMOL 500 MG: 500 TABLET ORAL at 05:49

## 2022-03-02 RX ADMIN — SALINE NASAL SPRAY 1 SPRAY: 1.5 SOLUTION NASAL at 23:26

## 2022-03-02 RX ADMIN — OXYCODONE HYDROCHLORIDE 10 MG: 10 TABLET ORAL at 13:02

## 2022-03-02 RX ADMIN — ACETAMINOPHEN 975 MG: 325 TABLET ORAL at 00:44

## 2022-03-02 RX ADMIN — METHOCARBAMOL 500 MG: 500 TABLET ORAL at 18:33

## 2022-03-02 RX ADMIN — HYDROMORPHONE HYDROCHLORIDE 0.5 MG: 1 INJECTION, SOLUTION INTRAMUSCULAR; INTRAVENOUS; SUBCUTANEOUS at 09:52

## 2022-03-02 RX ADMIN — HEPARIN SODIUM 5000 UNITS: 5000 INJECTION INTRAVENOUS; SUBCUTANEOUS at 22:45

## 2022-03-02 RX ADMIN — METHOCARBAMOL 500 MG: 500 TABLET ORAL at 23:26

## 2022-03-02 RX ADMIN — GABAPENTIN 200 MG: 100 CAPSULE ORAL at 08:30

## 2022-03-02 RX ADMIN — DOCUSATE SODIUM 100 MG: 100 CAPSULE ORAL at 18:33

## 2022-03-02 RX ADMIN — ACETAMINOPHEN 975 MG: 325 TABLET ORAL at 05:49

## 2022-03-02 RX ADMIN — HYDROMORPHONE HYDROCHLORIDE 0.5 MG: 1 INJECTION, SOLUTION INTRAMUSCULAR; INTRAVENOUS; SUBCUTANEOUS at 04:49

## 2022-03-02 RX ADMIN — ACETAMINOPHEN 975 MG: 325 TABLET ORAL at 13:01

## 2022-03-02 RX ADMIN — HYDROMORPHONE HYDROCHLORIDE 0.5 MG: 1 INJECTION, SOLUTION INTRAMUSCULAR; INTRAVENOUS; SUBCUTANEOUS at 13:56

## 2022-03-02 RX ADMIN — OXYCODONE HYDROCHLORIDE 10 MG: 10 TABLET ORAL at 08:48

## 2022-03-02 RX ADMIN — METHOCARBAMOL 500 MG: 500 TABLET ORAL at 11:34

## 2022-03-02 RX ADMIN — GABAPENTIN 200 MG: 100 CAPSULE ORAL at 00:44

## 2022-03-02 RX ADMIN — GABAPENTIN 200 MG: 100 CAPSULE ORAL at 22:36

## 2022-03-02 RX ADMIN — HEPARIN SODIUM 5000 UNITS: 5000 INJECTION INTRAVENOUS; SUBCUTANEOUS at 00:48

## 2022-03-02 RX ADMIN — OXYCODONE HYDROCHLORIDE 10 MG: 10 TABLET ORAL at 22:44

## 2022-03-02 RX ADMIN — HYDROMORPHONE HYDROCHLORIDE 0.5 MG: 1 INJECTION, SOLUTION INTRAMUSCULAR; INTRAVENOUS; SUBCUTANEOUS at 00:44

## 2022-03-02 RX ADMIN — SODIUM CHLORIDE, SODIUM LACTATE, POTASSIUM CHLORIDE, AND CALCIUM CHLORIDE 100 ML/HR: .6; .31; .03; .02 INJECTION, SOLUTION INTRAVENOUS at 03:12

## 2022-03-02 NOTE — H&P
H&P - Trauma   Bi Meyers 78 y o  female MRN: [id number]  Unit/Bed#: CRB Encounter: [id number]    Trauma Alert: Other trauma transfer   Model of Arrival: Ambulance    Trauma Team: Attending Jazmín Lloyd, Residents Germán Smith and Fellow [facility]  Consultants:     Neurosurgery: routine consult; Epic consult order placed; Assessment/Plan   Active Problems / Assessment:   Ground-level fall  Multiple (displaced w/ sharp edge in contact w/ lung and liver) right rib fractures (ribs 10-12)  Traumatic right hydropneumothorax  ? Liver contusion (vs liver cyst)  Right L2-L3 transverse process fractures  Plan:   - rib fracture protocol/multi-modal analgesia  - neurosurgery consult  - APS consult  - AM CXR   - DVT/PE mechanical & chemoprophylaxis  History of Present Illness     Chief Complaint: chest pain  Mechanism:Fall     HPI:    Bi Meyers is a 78 y o  female who presents as a trauma transfer from [facility] following a ground level fall at home  She reports falling backwards and hitting a sharp-edged concrete side on the way down  She says her joints basically gave way prior to the fall  She reports gait & mobility problems at baseline & ambulates w/ a cane 2/2 to polyarthritis involving both large and small joints  No hx of LOC following the fall  No hx of bleeding from craniofacial orifices, seizures, headaches or vomiting  She reports some right sided upper abdominal pain and 8-9/10 right-sided chest pain  Hx of lower back pain  She denies any extremity pain or deformity  She was able to call her son who subsequently transported her to the hospital     Review of Systems   Constitutional: Positive for activity change  Negative for appetite change, chills, diaphoresis, fatigue, fever and unexpected weight change  HENT: Negative for drooling, ear discharge, facial swelling, hearing loss, nosebleeds and voice change  Eyes: Negative  Respiratory: Positive for shortness of breath   Negative for apnea, cough, chest tightness, wheezing and stridor  Cardiovascular: Positive for chest pain  Negative for palpitations and leg swelling  Gastrointestinal: Positive for abdominal pain  Negative for abdominal distention, anal bleeding and vomiting  Endocrine: Negative  Genitourinary: Negative  Musculoskeletal: Positive for arthralgias, back pain and gait problem  Negative for neck pain and neck stiffness  Skin: Negative  Allergic/Immunologic: Negative  Neurological: Negative for dizziness, tremors, seizures, syncope, facial asymmetry, speech difficulty, weakness, light-headedness, numbness and headaches  Hematological: Negative  Psychiatric/Behavioral: Negative  All other systems reviewed and are negative  12-point, complete review of systems was reviewed and negative except as stated above  Historical Information     Past Medical History:   Diagnosis Date    Anxiety     Asthma     Chronic pain     Depression     Enterovirus heart infection      Past Surgical History:   Procedure Laterality Date    FIXATION KYPHOPLASTY LUMBAR SPINE      HYSTERECTOMY      REPAIR RECTOCELE          Social History     Tobacco Use    Smoking status: Never Smoker    Smokeless tobacco: Never Used   Vaping Use    Vaping Use: Never used   Substance Use Topics    Alcohol use: Yes     Comment: social    Drug use: No     Immunization History   Administered Date(s) Administered    COVID-19 MODERNA VACC 0 5 ML IM 03/30/2021, 05/12/2021    Tdap 07/21/2021     Last Tetanus: unknown  Family History: Non-contributory    1  Before the illness or injury that brought you to the Emergency, did you need someone to help you on a regular basis? 0=No   2  Since the illness or injury that brought you to the Emergency, have you needed more help than usual to take care of yourself? 0=No   3   Have you been hospitalized for one or more nights during the past 6 months (excluding a stay in the Emergency Department)? 0=No   4  In general, do you see well? 0=Yes   5  In general, do you have serious problems with your memory? 0=No   6  Do you take more than three different medications everyday? 1=Yes   TOTAL   1     Did you order a geriatric consult if the score was 2 or greater?: n/a     Meds/Allergies   all current active meds have been reviewed     Allergies   Allergen Reactions    Penicillins        Objective   Initial Vitals:   Temperature: 98 7 °F (37 1 °C) (03/01/22 2345)  Pulse: 84 (03/01/22 2345)  Respirations: 18 (03/01/22 2345)  Blood Pressure: 151/86 (03/01/22 2345)    Primary Survey:   Airway:        Status: patent;        Pre-hospital Interventions: none        Hospital Interventions: none  Breathing: Hospital Interventions: Normal breath sounds w/ slightly reduced air entry B/L       Pre-hospital Interventions: none       Effort: normal       Right breath sounds:        Left breath sounds:   Circulation:        Rhythm: regular       Rate: regular   Right Pulses Left Pulses    R radial: 2+  R femoral: 2+  R pedal: 1+  R carotid: 2+   L radial: 2+  L femoral: 2+  L pedal: 1+  L carotid: 2+     Disability:        GCS: Eye: 4; Verbal: 5 Motor: 6 Total: 15       Right Pupil: 3 mm;  round;  reactive         Left Pupil:  3 mm;  round;  reactive      R Motor Strength L Motor Strength    R : 5/5  R dorsiflex: 5/5  R plantarflex: 5/5 L : 5/5  L dorsiflex: 5/5  L plantarflex: 5/5        Sensory:  No sensory deficit  Exposure:       Completed: Yes      Secondary Survey:  Physical Exam  Vitals reviewed  Constitutional:       Appearance: She is normal weight  She is not toxic-appearing or diaphoretic  HENT:      Head: Normocephalic and atraumatic  Right Ear: External ear normal       Left Ear: External ear normal       Nose: Nose normal       Mouth/Throat:      Mouth: Mucous membranes are moist    Eyes:      Pupils: Pupils are equal, round, and reactive to light     Neck:      Vascular: No carotid bruit  Cardiovascular:      Rate and Rhythm: Normal rate and regular rhythm  Pulses: Normal pulses  Pulmonary:      Effort: No respiratory distress  Breath sounds: Normal breath sounds  Comments: Slightly reduced effort  Chest:      Chest wall: Tenderness present  Abdominal:      General: A surgical scar is present  There is no distension  Palpations: Abdomen is soft  Tenderness: There is abdominal tenderness in the right upper quadrant  There is no right CVA tenderness, left CVA tenderness, guarding or rebound  Musculoskeletal:         General: No swelling, tenderness, deformity or signs of injury  Normal range of motion  Cervical back: Normal range of motion and neck supple  No rigidity or tenderness  Right lower leg: No edema  Left lower leg: No edema  Skin:     General: Skin is warm  Capillary Refill: Capillary refill takes less than 2 seconds  Neurological:      General: No focal deficit present  Mental Status: She is alert and oriented to person, place, and time  Cranial Nerves: No cranial nerve deficit  Sensory: No sensory deficit     Psychiatric:         Mood and Affect: Mood normal          Invasive Devices  Report    Peripheral Intravenous Line            Peripheral IV 03/01/22 Left Antecubital <1 day              Lab Results:   Results: I have personally reviewed all pertinent laboratory/tests results, BMP/CMP:   Lab Results   Component Value Date    SODIUM 138 03/01/2022    K 4 2 03/01/2022     03/01/2022    CO2 26 03/01/2022    BUN 11 03/01/2022    CREATININE 1 12 03/01/2022    CALCIUM 10 1 03/01/2022    EGFR 50 03/01/2022   , CBC:   Lab Results   Component Value Date    WBC 5 83 03/01/2022    HGB 12 2 03/01/2022    HCT 37 0 03/01/2022    MCV 90 03/01/2022     03/01/2022    MCH 29 8 03/01/2022    MCHC 33 0 03/01/2022    RDW 12 8 03/01/2022    MPV 9 9 03/01/2022    NRBC 0 03/01/2022    and Coagulation:   Lab Results Component Value Date    INR 1 03 03/01/2022       Imaging Results: I have personally reviewed pertinent reports  Chest Xray(s): N/A   FAST exam(s): N/A   CT Scan(s): positive for acute findings: rib fractures, lumbar fractures, traumatic hydropneumothorax   Additional Xray(s): N/A     Other Studies: NA    Code Status: Level 1 - Full Code  Advance Directive and Living Will:      Power of :    POLST:    I have spent 30 minutes with Patient  today in which greater than 50% of this time was spent in counseling/coordination of care regarding Diagnostic results, Prognosis, Patient and family education and Impressions

## 2022-03-02 NOTE — PROGRESS NOTES
[facility]  Progress Note - Arvis Argue 1955, 78 y o  female MRN: [id number]  Unit/Bed#: [facility] 620-01 Encounter: [id number]  Primary Care Provider: Fabián Castillo DO   Date and time admitted to hospital: 3/1/2022 11:32 PM    Fall  Assessment & Plan  - mechanical fall  -sustained the below stated injuries  -PT OT evaluations recommending inpatient rehab  -case management for dispo planning    * Closed fracture of multiple ribs of right side  Assessment & Plan  - Multiple right-sided rib fractures (10-12th), present on admission   - Continue rib fracture protocol   - Continue to encourage incentive spirometer use and adequate pulmonary hygiene  Currently pulling 750 mL on I S   - Continue multimodal analgesic regimen  Appreciate APS evaluation and recommendations   - Supplemental oxygen via nasal cannula as needed to maintain saturations greater than or equal to 94%  Currently on room air    - Repeat chest x-ray from 3/2 reviewed  - PT and OT evaluation and treatment as indicated  - Outpatient follow-up in the trauma clinic for re-evaluation in approximately 2 weeks  Traumatic pneumothorax  Assessment & Plan  - seen on admission CT chest  - f/u CXR on 3/2 shows no obvious PTX  Will f/u official read  - encourage pulmonary toilette/IS    Closed fracture of transverse process of lumbar vertebra (HCC)  Assessment & Plan  - pain control with multimodal analgesic regimen  - quick draw brace for comfort  - no intervention or neurosurgical evaluation necessary    Liver contusion  Assessment & Plan  -hemoglobin stable at 11 6 this afternoon, will repeat hemoglobin in a m   -abdominal exam is benign  -likely secondary to localized rib fractures with displacement adjacent to the liver    Acute pain due to trauma  Assessment & Plan  - Acute pain secondary to traumatic injuries  - Appreciate APS evaluation and recommendations    Patient was offered an epidural catheter but declined  Will continue multimodal analgesic regimen at this time and resume home MSIR 15 mg q 12 in addition to p r n  Oxycodone and scheduled Tylenol, methocarbamol, gabapentin and Lidoderm patches  - Bowel regimen as long as using opioids   - Continue to monitor pain and adjust regimen as indicated  Right shoulder pain  Assessment & Plan  -x-ray completed, no obvious fracture  -pain control multimodal analgesic regimen  -may be referred pain from right-sided rib fractures as well        TERTIARY TRAUMA SURVEY NOTE    Prophylaxis: Sequential compression device (Venodyne)  and Heparin    Disposition:  Continue med surg status, inpatient rehab recommended for PT/OT, continue rib fracture protocol/pulmonary toileting    Code status:  Level 1 - Full Code    Consultants:  Acute Pain Service      SUBJECTIVE:     Transfer from:  [address]   Mechanism of Injury:Fall    Chief Complaint:  I am having pain in my ribs    HPI/Last 24 hour events:  Patient reports discomfort in her right chest wall  She states that is gotten worse since admission  She denies abdominal pain, dizziness or lightheadedness  She denies shortness of breath this morning  She confirms that she tripped when she was walking and fell on the corner of a concrete step  She states that this occurred at home  She confirms that she did not hit her head or lose consciousness with the fall      Active medications:           Current Facility-Administered Medications:     acetaminophen (TYLENOL) tablet 975 mg, 975 mg, Oral, Q8H KHARI, 975 mg at 03/02/22 1301    albuterol inhalation solution 2 5 mg, 2 5 mg, Nebulization, Q6H PRN    docusate sodium (COLACE) capsule 100 mg, 100 mg, Oral, BID    gabapentin (NEURONTIN) capsule 200 mg, 200 mg, Oral, TID, 200 mg at 03/02/22 0830    heparin (porcine) subcutaneous injection 5,000 Units, 5,000 Units, Subcutaneous, Q8H [address] **AND** [COMPLETED] Platelet count, , , Once    HYDROmorphone (DILAUDID) injection 0 5 mg, 0 5 mg, Intravenous, Q4H PRN, 0 5 mg at 03/02/22 1356    lidocaine (LIDODERM) 5 % patch 1 patch, 1 patch, Topical, Daily, 1 patch at 03/02/22 0831    methocarbamol (ROBAXIN) tablet 500 mg, 500 mg, Oral, Q6H KHARI, 500 mg at 03/02/22 1134    morphine (MS CONTIN) ER tablet 15 mg, 15 mg, Oral, Q12H KHARI    ondansetron (ZOFRAN) injection 4 mg, 4 mg, Intravenous, Q6H PRN    oxyCODONE (ROXICODONE) immediate release tablet 10 mg, 10 mg, Oral, Q4H PRN    oxyCODONE (ROXICODONE) IR tablet 5 mg, 5 mg, Oral, Q4H PRN    senna (SENOKOT) tablet 17 2 mg, 2 tablet, Oral, Daily      OBJECTIVE:     Vitals:   Vitals:    03/02/22 1447   BP: 132/72   Pulse: 85   Resp:    Temp: 97 6 °F (36 4 °C)   SpO2: 93%       Physical Exam:   GENERAL APPEARANCE:  No acute distress  NEURO:  GCS 15, nonfocal exam  HEENT:  Normocephalic, atraumatic  CV:  Regular rate and rhythm, no murmurs gallops or rubs  LUNGS:  Clear to auscultation bilaterally; +pulling 750 mL on IS; +currently saturating in the high 90s on room air  GI:  Soft, nontender, nondistended  :  Voiding  MSK:  +right shoulder tenderness to palpation limited range of motion right shoulder secondary to pain; +right-sided chest wall tenderness to palpation  No other edema, contusions or deformities  SKIN:  Pink, warm, dry        PIC Score  PIC Pain Score: 1 (3/2/2022  1:56 PM)  PIC Incentive Spirometry Score: 3 (3/2/2022 11:42 AM)  PIC Cough Description: 3 (3/2/2022 11:42 AM)  PIC Total Score: 7 (3/2/2022 11:42 AM)       If the Total PIC Score </=5, did you consult APS and evaluate patient for further intervention?:  Acute Pain service following, I have recommended epidural catheter but patient declined  Will continue to trial multimodal analgesic regimen         Pain:    Incentive Spirometry  Cough  3 = Controlled  4 = Above goal volume 3 = Strong  2 = Moderate  3 = Goal to alert volume 2 = Weak  1 = Severe  2 = Below alert volume 1 = Absent     1 = Unable to perform IS           I/O:   I/O       02/28 0701  03/01 0700 03/01 0701  03/02 0700 03/02 0701  03/03 0700    P  O    0    I V  (mL/kg)  1000 (17 2) 770 (13 3)    Total Intake(mL/kg)  1000 (17 2) 770 (13 3)    Urine (mL/kg/hr)   460 (1)    Stool   0    Total Output   460    Net  +1000 +310           Unmeasured Urine Occurrence   1 x    Unmeasured Stool Occurrence   0 x          Invasive Devices: Invasive Devices  Report    Peripheral Intravenous Line            Peripheral IV 03/01/22 Left Antecubital <1 day                  Imaging:   CT head without contrast    Result Date: 3/1/2022  Impression: No acute intracranial abnormality  Workstation performed: [id number]     CT spine cervical without contrast    Result Date: 3/1/2022  Impression: No cervical spine fracture or traumatic malalignment  Right apical pneumothorax  Workstation performed: [id number]     CT chest abdomen pelvis w contrast    Result Date: 3/1/2022  Impression: Acute displaced right 10th-12th rib fractures with sharp rib ends extending into the pleural space on the right posteriorly  Right hydropneumothorax which may not reexpand with chest tube given the sharp bony fragments  Nondisplaced right L2 and L3 transverse process fractures  Hypodensity in the liver adjacent to one of the rib fractures could represent small contusion or cyst   I personally discussed this study with Phil Kelly on 3/1/2022 at 9:44 PM   Trauma team will be consulted   Workstation performed: [id number]       Labs:   CBC:   Lab Results   Component Value Date    WBC 4 61 03/02/2022    HGB 11 6 03/02/2022    HCT 33 0 (L) 03/02/2022    MCV 91 03/02/2022     03/02/2022    MCH 29 9 03/02/2022    MCHC 33 0 03/02/2022    RDW 13 2 03/02/2022    MPV 10 4 03/02/2022    NRBC 0 03/02/2022     CMP:   Lab Results   Component Value Date     03/02/2022    CO2 25 03/02/2022    BUN 13 03/02/2022    CREATININE 0 93 03/02/2022    CALCIUM 9 6 03/02/2022    EGFR 63 03/02/2022

## 2022-03-02 NOTE — PLAN OF CARE
Problem: OCCUPATIONAL THERAPY ADULT  Goal: Performs self-care activities at highest level of function for planned discharge setting  See evaluation for individualized goals  Description: Treatment Interventions: ADL retraining,Functional transfer training,Endurance training,Cognitive reorientation,Patient/family training,Equipment evaluation/education,Compensatory technique education,Energy conservation,Activityengagement          See flowsheet documentation for full assessment, interventions and recommendations  Note: Limitation: Decreased ADL status,Decreased Safe judgement during ADL,Decreased cognition,Decreased endurance,Decreased self-care trans,Decreased high-level ADLs  Prognosis: Good  Assessment: 77 YO Female SEEN FOR INITIAL OCCUPATIONAL THERAPY EVALUATION FOLLOWING TXF FROM SLA->SLB S/P FALL RESULTING IN R SIDED RIB FX, R HYDROPNEUMOTHORAX, R L2-3 TP FX, AND LIVER CONTUSION VS LIVER CYST  PROBLEMS LIST INCLUDES Anxiety, Asthma, Chronic pain, Depression, and Enterovirus heart infection  PT IS FROM HOME ALONE WHERE SHE REPORTS BEING INDEPENDENT WITH ADLS/IADLS/DRIVING PTA  PT CURRENTLY REQUIRES OVERALL MIN-MOD A WITH ADLS, AND MOD A WITH TRANSFERS /FUNCTIONAL MOBILITY WITH USE OF RW  PT IS LIMITED 2' PAIN, FATIGUE, IMPAIRED BALANCE, FALL RISK , OVERALL WEAKNESS/DECONDITIONING , SOB, LIMITED FAMILY/FRIEND SUPPORT , INACCESSIBLE HOME ENVIRONMENT and OVERALL LIMITED ACTIVITY TOLERANCE  PT EDUCATED ON DEEP BREATHING TECHNIQUES T/O ACTIVITY, SLOWING OF PACE, ENERGY CONSERVATION TECHNIQUES FOR CARRY OVER UPON D/C, INCREASED FAMILY SUPPORT and CONTINUE PARTICIPATION IN SELF-CARE/MOBILITY WITH STAFF Kavon Lo   The patient's raw score on the AM-PAC Daily Activity inpatient short form is 14, standardized score is 33 39, less than 39 4  Patients at this level are likely to benefit from discharge to post-acute rehabilitation services   Please refer to the recommendation of the Occupational Therapist for safe discharge planning  FROM AN OCCUPATIONAL THERAPY PERSPECTIVE, PT WOULD BENEFIT FROM ADDITIONAL OT SERVICES IN AN INPT REHAB SETTING UPON D/C  WILL CONT TO FOLLOW TO ADDRESS THE BELOW DESCRIBED GOALS  OT Discharge Recommendation: Post acute rehabilitation services  OT - OK to Discharge:  Yes

## 2022-03-02 NOTE — EMTALA/ACUTE CARE TRANSFER
[facility] [address]  [address] [facility] [address]  Dept: [phone]      EMTALA TRANSFER CONSENT    NAME Henri Vo                                         1955                              MRN [id number]    I have been informed of my rights regarding examination, treatment, and transfer   by Andrea Burnett PA-C  Benefits:  Evaluation by trauma surgery    Risks:  Decline, injury during transfer      Consent for Transfer:  I acknowledge that my medical condition has been evaluated and explained to me by the emergency department physician or other qualified medical person and/or my attending physician, who has recommended that I be transferred to the service of    at    The above potential benefits of such transfer, the potential risks associated with such transfer, and the probable risks of not being transferred have been explained to me, and I fully understand them  The doctor has explained that, in my case, the benefits of transfer outweigh the risks  I agree to be transferred  I authorize the performance of emergency medical procedures and treatments upon me in both transit and upon arrival at the receiving facility  Additionally, I authorize the release of any and all medical records to the receiving facility and request they be transported with me, if possible  I understand that the safest mode of transportation during a medical emergency is an ambulance and that the Hospital advocates the use of this mode of transport  Risks of traveling to the receiving facility by car, including absence of medical control, life sustaining equipment, such as oxygen, and medical personnel has been explained to me and I fully understand them  (STANLEY CORRECT BOX BELOW)  [  ]  I consent to the stated transfer and to be transported by ambulance/helicopter    [  ]  I consent to the stated transfer, but refuse transportation by ambulance and accept full responsibility for my transportation by car  I understand the risks of non-ambulance transfers and I exonerate the Hospital and its staff from any deterioration in my condition that results from this refusal     X___________________________________________    DATE  22  TIME________  Signature of patient or legally responsible individual signing on patient behalf           RELATIONSHIP TO PATIENT_________________________          Provider Certification    NAME Roxana Xiao                                         1955                              MRN [id number]    A medical screening exam was performed on the above named patient  Based on the examination:    Condition Necessitating Transfer The primary encounter diagnosis was Fall, initial encounter  Diagnoses of Rib fracture and Pneumothorax were also pertinent to this visit  Patient Condition:  Stable    Reason for Transfer:  Displaced rib fractures, pneumothorax, nondisplaced spinal transverse process fractures    Transfer Requirements: Facility     · Space available and qualified personnel available for treatment as acknowledged by  Dr Tejas Hines  · Agreed to accept transfer and to provide appropriate medical treatment as acknowledged by Dr Tejas Hines          · Appropriate medical records of the examination and treatment of the patient are provided at the time of transfer   [address], [address] _______  · Transfer will be performed by qualified personnel from    and appropriate transfer equipment as required, including the use of necessary and appropriate life support measures      Provider Certification: I have examined the patient and explained the following risks and benefits of being transferred/refusing transfer to the patient/family:   Benefits: evaluation by trauma/trauma surgery, higher level of care  Risks: decline of condition, injury during transfer      Based on these reasonable risks and benefits to the patient and/or the unborn child(michael), and based upon the information available at the time of the patients examination, I certify that the medical benefits reasonably to be expected from the provision of appropriate medical treatments at another medical facility outweigh the increasing risks, if any, to the individuals medical condition, and in the case of labor to the unborn child, from effecting the transfer      X____________________________________________ DATE 03/01/22        TIME_______      ORIGINAL - SEND TO MEDICAL RECORDS   COPY - SEND WITH PATIENT DURING TRANSFER

## 2022-03-02 NOTE — ASSESSMENT & PLAN NOTE
- pain control with multimodal analgesic regimen  - quick draw brace for comfort  - no intervention or neurosurgical evaluation necessary

## 2022-03-02 NOTE — ASSESSMENT & PLAN NOTE
- mechanical fall  -sustained the below stated injuries  -PT OT evaluations recommending inpatient rehab  -case management for dispo planning

## 2022-03-02 NOTE — RESPIRATORY THERAPY NOTE
RT Protocol Note  Sophia Olvera 78 y o  female MRN: [id number]  Unit/Bed#: [facility] Encounter: [id number]    Assessment    Principal Problem:    Closed fracture of multiple ribs of right side  Active Problems:    Traumatic pneumothorax    Closed fracture of transverse process of lumbar vertebra (HCC)    Liver contusion      Home Pulmonary Medications:  symbicort 2x daily       Past Medical History:   Diagnosis Date    Anxiety     Asthma     Chronic pain     Depression     Enterovirus heart infection      Social History     Socioeconomic History    Marital status:      Spouse name: None    Number of children: None    Years of education: None    Highest education level: None   Occupational History    None   Tobacco Use    Smoking status: Never Smoker    Smokeless tobacco: Never Used   Vaping Use    Vaping Use: Never used   Substance and Sexual Activity    Alcohol use: Yes     Comment: social    Drug use: No    Sexual activity: None   Other Topics Concern    None   Social History Narrative    None     Social Determinants of Health     Financial Resource Strain: Not on file   Food Insecurity: Not on file   Transportation Needs: Not on file   Physical Activity: Not on file   Stress: Not on file   Social Connections: Not on file   Intimate Partner Violence: Not on file   Housing Stability: Not on file       Subjective         Objective    Physical Exam:        Vitals:  Blood pressure 140/75, pulse 84, temperature 98 7 °F (37 1 °C), temperature source Tympanic, resp  rate 18, SpO2 97 %  Imaging and other studies: I have personally reviewed pertinent reports              Plan  Order prn albuterol q6  Respiratory Plan: Mild Distress pathway  Airway Clearance Plan: Incentive Spirometer

## 2022-03-02 NOTE — CASE MANAGEMENT
Case Management Assessment & Discharge Planning Note    Patient name Alisia Saenz  Location [address] [place] [address] [facility] [id number]  : 1955 Date 3/2/2022       Current Admission Date: 3/1/2022  Current Admission Diagnosis:Closed fracture of multiple ribs of right side   Patient Active Problem List    Diagnosis Date Noted    Closed fracture of multiple ribs of right side 2022    Traumatic pneumothorax 2022    Closed fracture of transverse process of lumbar vertebra ([facility] ) 2022    Liver contusion 2022    Fall 2022    Acute pain due to trauma 2022    Right shoulder pain 2022    Severe protein-calorie malnutrition ([facility] ) 2022    Undifferentiated connective tissue disease ([facility] ) 2021    Primary generalized (osteo)arthritis 2021    Peripheral neuropathy 2021    Senile osteoporosis 2021    Hx of compression fracture of spine 2021    Lumbar spondylosis 2021    Cervical spondylosis 2021    Anemia 2021    Chronic pain syndrome     Asthma     Ambulatory dysfunction     Acute metabolic encephalopathy     Acute cystitis without hematuria     Seronegative arthropathy of multiple sites ([facility] )     Gastroesophageal reflux disease without esophagitis     Mixed stress and urge urinary incontinence     CHF (congestive heart failure) (HCC)       LOS (days): 1  Geometric Mean LOS (GMLOS) (days): 3 10  Days to GMLOS:2 4     OBJECTIVE:    Risk of Unplanned Readmission Score: 11         Current admission status: Inpatient       Preferred Pharmacy:   [address] [place] [address] [address]  Phone: [phone] Fax: [phone]    Primary Care Provider: Josee Rivas DO    Primary Insurance: MEDICARE  Secondary Insurance: AARP    ASSESSMENT:  [facility] Ravi Agents    There are no active Health Care Agents on file                   Readmission Root Cause  30 Day Readmission: No    Patient Information  Admitted from[de-identified] Home  Mental Status: Alert  During Assessment patient was accompanied by: Not accompanied during assessment  Assessment information provided by[de-identified] Patient  Primary Caregiver: Self  Support Systems: Self,Daughter,Family members  [place] of Residence: [address] [place] [address] do you live in?: [address] [facility] entry access options  Select all that apply : Stairs  Number of steps to enter home  : 1  Do the steps have railings?: No  Type of Current Residence: 2 story home  Upon entering residence, is there a bedroom on the main floor (no further steps)?: No  A bedroom is located on the following floor levels of residence (select all that apply):: 2nd Floor  Upon entering residence, is there a bathroom on the main floor (no further steps)?: No  Indicate which floors of current residence have a bathroom (select all the apply):: 2nd Floor  Number of steps to 2nd floor from main floor: One Flight  In the last 12 months, how many places have you lived?: 1  In the last 12 months, was there a time when you did not have a steady place to sleep or slept in a shelter (including now)?: No  Homeless/housing insecurity resource given?: N/A  Living Arrangements: Lives Alone  Is patient a ?: No    Activities of Daily Living Prior to Admission  Functional Status: Independent  Completes ADLs independently?: Yes  Ambulates independently?: Yes  Does patient use assisted devices?: No  Does patient currently own DME?: Yes  What DME does the patient currently own?: [employer]  Does patient have a history of Outpatient Therapy (PT/OT)?: No  Does the patient have a history of Short-Term Rehab?: No  Does patient have a history of HHC?: No  Does patient currently have [facility]?: No         Patient Information Continued  Income Source: Pension/prison  Does patient have prescription coverage?: No  Within the past 12 months, you worried that your food would run out before you got the money to buy more : Never true  Within the past 12 months, the food you bought just didnt last and you didnt have money to get more : Never true  Food insecurity resource given?: N/A  Does patient receive dialysis treatments?: No  Does patient have a history of substance abuse?: No  Does patient have a history of Mental Health Diagnosis?: No         Means of Transportation  Means of Transport to Appts[de-identified] Drives Self  In the past 12 months, has lack of transportation kept you from medical appointments or from getting medications?: No  In the past 12 months, has lack of transportation kept you from meetings, work, or from getting things needed for daily living?: No  Was application for public transport provided?: N/A        DISCHARGE DETAILS:    Discharge planning discussed with[de-identified] patient  Freedom of Choice: Yes     CM contacted family/caregiver?: Yes  Were Treatment Team discharge recommendations reviewed with patient/caregiver?: Yes  Did patient/caregiver verbalize understanding of patient care needs?: Yes  Were patient/caregiver advised of the risks associated with not following Treatment Team discharge recommendations?: Yes    Contacts  Patient Contacts: Dominik Chamorro (Brother) [phone]  Relationship to Patient[de-identified] Family  Contact Method: Phone  Phone Number: [phone]  Reason/Outcome: Continuity of Care,Emergency Contact,Discharge Planning      Treatment Team Recommendation: SNF  Discharge Destination Plan[de-identified] SNF     Pt was seen by OT/PT and recommended for IP rehab  CM discussed this plan with pt and she was in agreement  Pt's been to [facility] in the past, and would like a referral there  CM placed and will follow up       CM reviewed d/c planning process including the following: identifying help at home, patient preference for d/c planning needs, Discharge Lounge, [facility] Meds to Bed program, availability of treatment team to discuss questions or concerns patient and/or family may have regarding understanding medications and recognizing signs and symptoms once discharged  CM also encouraged patient to follow up with all recommended appointments after discharge  Patient advised of importance for patient and family to participate in managing patients medical well being

## 2022-03-02 NOTE — EMTALA/ACUTE CARE TRANSFER
[facility]  [address] [facility] [address]  Dept: [phone]      EMTALA TRANSFER CONSENT    NAME Cisco Galvan                                         1955                              MRN [id number]    I have been informed of my rights regarding examination, treatment, and transfer   by Dr Mame Fabian MD    Benefits: Specialized equipment and/or services available at the receiving facility (Include comment)________________________,Continuity of care    Risks: Potential for delay in receiving treatment,Potential deterioration of medical condition,Increased discomfort during transfer      Consent for Transfer:  I acknowledge that my medical condition has been evaluated and explained to me by the emergency department physician or other qualified medical person and/or my attending physician, who has recommended that I be transferred to the service of  Accepting Physician: Dr Honey Go at [address] [facility] Name, [id number] : [facility]  The above potential benefits of such transfer, the potential risks associated with such transfer, and the probable risks of not being transferred have been explained to me, and I fully understand them  The doctor has explained that, in my case, the benefits of transfer outweigh the risks  I agree to be transferred  I authorize the performance of emergency medical procedures and treatments upon me in both transit and upon arrival at the receiving facility  Additionally, I authorize the release of any and all medical records to the receiving facility and request they be transported with me, if possible  I understand that the safest mode of transportation during a medical emergency is an ambulance and that the Hospital advocates the use of this mode of transport   Risks of traveling to the receiving facility by car, including absence of medical control, life sustaining equipment, such as oxygen, and medical personnel has been explained to me and I fully understand them  (STANLEY CORRECT BOX BELOW)  [  ]  I consent to the stated transfer and to be transported by ambulance/helicopter  [  ]  I consent to the stated transfer, but refuse transportation by ambulance and accept full responsibility for my transportation by car  I understand the risks of non-ambulance transfers and I exonerate the Hospital and its staff from any deterioration in my condition that results from this refusal     X___________________________________________    DATE  22  TIME________  Signature of patient or legally responsible individual signing on patient behalf           RELATIONSHIP TO PATIENT_________________________          Provider Certification    NAME Janet Alex                                         1955                              MRN [id number]    A medical screening exam was performed on the above named patient  Based on the examination:    Condition Necessitating Transfer The primary encounter diagnosis was Fall, initial encounter  Diagnoses of Rib fracture and Pneumothorax were also pertinent to this visit      Patient Condition: The patient has been stabilized such that within reasonable medical probability, no material deterioration of the patient condition or the condition of the unborn child(michael) is likely to result from the transfer    Reason for Transfer: Level of Care needed not available at this facility    Transfer Requirements: Facility [facility]   · Space available and qualified personnel available for treatment as acknowledged by PACS  · Agreed to accept transfer and to provide appropriate medical treatment as acknowledged by       Dr Carroll Marte  · Appropriate medical records of the examination and treatment of the patient are provided at the time of transfer   [address] _______  · Transfer will be performed by qualified personnel from [facility]  and appropriate transfer equipment as required, including the use of necessary and appropriate life support measures  Provider Certification: I have examined the patient and explained the following risks and benefits of being transferred/refusing transfer to the patient/family:  General risk, such as traffic hazards, adverse weather conditions, rough terrain or turbulence, possible failure of equipment (including vehicle or aircraft), or consequences of actions of persons outside the control of the transport personnel,Unanticipated needs of medical equipment and personnel during transport,Risk of worsening condition,The possibility of a transport vehicle being unavailable      Based on these reasonable risks and benefits to the patient and/or the unborn child(michael), and based upon the information available at the time of the patients examination, I certify that the medical benefits reasonably to be expected from the provision of appropriate medical treatments at another medical facility outweigh the increasing risks, if any, to the individuals medical condition, and in the case of labor to the unborn child, from effecting the transfer      X____________________________________________ DATE 03/01/22        TIME_______      ORIGINAL - SEND TO MEDICAL RECORDS   COPY - SEND WITH PATIENT DURING TRANSFER

## 2022-03-02 NOTE — ASSESSMENT & PLAN NOTE
- Multiple right-sided rib fractures (10-12th), present on admission   - Continue rib fracture protocol   - Continue to encourage incentive spirometer use and adequate pulmonary hygiene  Currently pulling 750 mL on I S   - Continue multimodal analgesic regimen  Appreciate APS evaluation and recommendations   - Supplemental oxygen via nasal cannula as needed to maintain saturations greater than or equal to 94%  Currently on room air    - Repeat chest x-ray from 3/2 reviewed  - PT and OT evaluation and treatment as indicated  - Outpatient follow-up in the trauma clinic for re-evaluation in approximately 2 weeks

## 2022-03-02 NOTE — PLAN OF CARE
Problem: Potential for Falls  Goal: Patient will remain free of falls  Description: INTERVENTIONS:  - Educate patient/family on patient safety including physical limitations  - Instruct patient to call for assistance with activity   - Consult OT/PT to assist with strengthening/mobility   - Keep Call bell within reach  - Keep bed low and locked with side rails adjusted as appropriate  - Keep care items and personal belongings within reach  - Initiate and maintain comfort rounds  - Make Fall Risk Sign visible to staff  - Offer Toileting every Hours, in advance of need  - Initiate/Maintain alarm  Problem: PAIN - ADULT  Goal: Verbalizes/displays adequate comfort level or baseline comfort level  Description: Interventions:  - Encourage patient to monitor pain and request assistance  - Assess pain using appropriate pain scale  - Administer analgesics based on type and severity of pain and evaluate response  - Implement non-pharmacological measures as appropriate and evaluate response  - Consider cultural and social influences on pain and pain management  - Notify physician/advanced practitioner if interventions unsuccessful or patient reports new pain  3/2/2022 1157 by Pennie Ladd RN  Outcome: Progressing  3/2/2022 1157 by Pennie Ladd, RN  Outcome: Progressing     - Apply yellow socks and bracelet for high fall risk patients  - Consider moving patient to room near nurses station  Outcome: Progressing

## 2022-03-02 NOTE — CONSULTS
Rib Fracture Consultation - Acute Pain Service   Agustin Matamoros 78 y o  female MRN: [id number]  Unit/Bed#: [facility] 620-01 Encounter: [id number]               Assessment/Plan     Assessment:   Patient Active Problem List   Diagnosis    Chronic pain syndrome    Asthma    Ambulatory dysfunction    Acute metabolic encephalopathy    Acute cystitis without hematuria    Seronegative arthropathy of multiple sites ([facility] [place] [address] [facility]    Gastroesophageal reflux disease without esophagitis    Mixed stress and urge urinary incontinence    CHF (congestive heart failure) ([facility] [place] [address] [facility]    Anemia    [facility] connective [facility] disease [facility]    Primary generalized (osteo)arthritis    Peripheral neuropathy    Senile osteoporosis    Hx of compression fracture of spine    Lumbar spondylosis    Cervical spondylosis    Closed fracture of multiple ribs of right side    Traumatic pneumothorax    Closed fracture of transverse process of lumbar vertebra ([facility] [place] [address] [facility]    Liver contusion       Assessment: Agustin Matamoros is a 78 y o  female with severe acute chest wall pain from displaced right 10-12 rib fractures after a fall  Patient is a good candidate for epidural vs  Peripheral nerve blocks and the risks and benefits of each procedure were discussed  Patient is interested, but would like more time to think and would like to be re-evaluated tomorrow morning  Plan:   - Will re-evaluate patient again tomorrow morning for epidural vs  peripheral nerve block  Please hold morning dose of heparin     -Continue home dosing of MSContin 15mg PO q12h  Patient takes this medication chronically for neck and back pain  Patient will have increased tolerance to opioid medications  - Multimodal analgesia with:  Oxycodone 5/10mg PO q4h PRN moderate/severe pain, hydromorphone 0 5mg IV q4h PRN breakthrough, acetaminophen 975mg PO q8h, lidocaine patches, methocarbamol 500mg PO q6h, gabapentin 200mg PO TID      APS will continue to follow   Please contact Acute Pain Service - [facility] via Ezuza from [phone] with additional questions or concerns  See Lyndsey or Riley for additional contacts and after hours information  Plan discussed with primary team    History of Present Illness    Admit Date:  3/1/2022  Hospital Day:  1 day  Primary Service:  Trauma  Attending Provider:  Gray Munoz MD  Reason for Consult / Principal    Problem: acute rib fracture pain    HPI: Shanti Arellano is a 78 y o  female with PMH of chronic pain (on MSContin 15mg bid), GERD, asthma, CHF, and anxiety/depression who presented as a trauma transfer from [facility] following a ground level fall at home on 3/1  She reports falling backwards and hitting a sharp-edged concrete side on the way down  She has gait & mobility problems at baseline & ambulates w/ a cane 2/2 to polyarthritis involving both large and small joints  She was found to have right sided posterior displaced 10-12 rib fractures, right pneumothorax, and right L2-L3 transverse process fractures  APS team consulted for acute rib fracture pain and management options  On evaluation today patient is complaining of right sharp posterior chest wall pain that is 8/10  Pain increases to 46/45 with certain movements  She is not requiring supplemental O2  She can only get up to 750ml on her ISB  She is also complaining of some right shoulder pain which is not as severe  She denies any nausea or vomiting  She denies any numbness, tingling, or weakness of her b/l lower extremities  She states she has chronic pain in her back and neck due to degeneration and scoliosis  She takes MSContin 15mg PO bid at home  She denies any adverse side effects from the analgesic medications she is receiving in hospital  Discussed with patient the possibility of placing and epidural vs  Peripheral nerve block and the risks and benefits of each procedure   Patient is interested, but would like more time to think and would like to be re-evaluated tomorrow morning  Rib Fracture Evaluation:  Injuries: right 10-12 rib fractures  Chest tube: no  Respiratory Co-morbidities: Asthma and General debility  SpO2:   SPO2 RA Rest      ED to Hosp-Admission (Current) from 3/1/2022 in [facility] [address]   SpO2 92 %   SpO2 Activity At Rest   O2 Device None (Room air)   O2 Flow Rate --        Incentive Spirometer: >750 mL  Platelet Count:   Results from last 7 days   Lab Units 03/02/22  0536   PLATELETS Thousands/uL 171     Coags:   Results from last 7 days   Lab Units 03/02/22  0536   INR  1 07   PROTIME seconds 13 5     Home anticoagulants: None  DVT prophylaxis: Heparin 5000 units SQ TID last dose 1302    Current pain location(s): right rib cage  Pain Scale: 8/10  Quality: sharp  Current Analgesic regimen:  Oxycodone 5/10mg PO q4h PRN moderate/severe pain, hydromorphone 0 5mg, acetaminophen 975mg PO q8h, lidocaine patches, methocarbamol 500mg PO q6h, gabapentin 200mg PO TID    Pain History: Chronic back and neck pain, scoliosis  Pain Management Provider:  Salazar Eisenberg MD, [facility]    I have reviewed the patient's controlled substance dispensing history in the Prescription Drug Monitoring Program in compliance with the [facility] regulations before prescribing any controlled substances  Inpatient consult to Acute Pain Service  Consult performed by: Joe Edwards DO  Consult ordered by: Radha Winkler MD          Review of Systems   Constitutional: Negative for appetite change, chills and fever  HENT: Negative for sore throat and trouble swallowing  Eyes: Negative for visual disturbance  Respiratory: Negative for shortness of breath, wheezing and stridor  Gastrointestinal: Negative for diarrhea, nausea and vomiting  Musculoskeletal: Positive for arthralgias, back pain, myalgias and neck pain  Skin: Negative for rash and wound     Neurological: Negative for dizziness, weakness and numbness  Psychiatric/Behavioral: Negative for agitation, confusion and hallucinations  Historical Information   Past Medical History:   Diagnosis Date    Anxiety     Asthma     Chronic pain     Depression     Enterovirus heart infection      Past Surgical History:   Procedure Laterality Date    FIXATION KYPHOPLASTY LUMBAR SPINE      HYSTERECTOMY      REPAIR RECTOCELE       Social History   Social History     Substance and Sexual Activity   Alcohol Use Yes    Comment: social     Social History     Substance and Sexual Activity   Drug Use Yes    Types: Marijuana    Comment: has medical card     Social History     Tobacco Use   Smoking Status Never Smoker   Smokeless Tobacco Never Used     Family History: History reviewed  No pertinent family history   Mother hx of CHF, father hx lung cancer    Meds/Allergies   all current active meds have been reviewed and current meds:   Current Facility-Administered Medications   Medication Dose Route Frequency    acetaminophen (TYLENOL) tablet 975 mg  975 mg Oral Q8H [address]    albuterol inhalation solution 2 5 mg  2 5 mg Nebulization Q6H PRN    docusate sodium (COLACE) capsule 100 mg  100 mg Oral BID    gabapentin (NEURONTIN) capsule 200 mg  200 mg Oral TID    heparin (porcine) subcutaneous injection 5,000 Units  5,000 Units Subcutaneous Q8H [address]    HYDROmorphone (DILAUDID) injection 0 5 mg  0 5 mg Intravenous Q4H PRN    lidocaine (LIDODERM) 5 % patch 1 patch  1 patch Topical Daily    methocarbamol (ROBAXIN) tablet 500 mg  500 mg Oral Q6H [address]    ondansetron (ZOFRAN) injection 4 mg  4 mg Intravenous Q6H PRN    oxyCODONE (ROXICODONE) immediate release tablet 10 mg  10 mg Oral Q4H PRN    oxyCODONE (ROXICODONE) IR tablet 5 mg  5 mg Oral Q4H PRN    senna (SENOKOT) tablet 17 2 mg  2 tablet Oral Daily       Allergies   Allergen Reactions    Penicillins        Objective   Temp:  [97 6 °F (36 4 °C)-98 7 °F (37 1 °C)] 98 2 °F (36 8 °C)  HR:  [80-93] 89  Resp: [18-20] 18  BP: (132-151)/(60-86) 132/78    Intake/Output Summary (Last 24 hours) at 3/2/2022 1229  Last data filed at 3/2/2022 1054  Gross per 24 hour   Intake 1770 ml   Output 160 ml   Net 1610 ml       Physical Exam  Vitals reviewed  Constitutional:       General: She is not in acute distress  Appearance: Normal appearance  HENT:      Head: Normocephalic and atraumatic  Eyes:      Extraocular Movements: Extraocular movements intact  Pupils: Pupils are equal, round, and reactive to light  Cardiovascular:      Rate and Rhythm: Normal rate  Pulmonary:      Effort: Pulmonary effort is normal  No respiratory distress  Breath sounds: Normal breath sounds  No stridor  Musculoskeletal:         General: Tenderness present  Skin:     General: Skin is warm and dry  Neurological:      General: No focal deficit present  Mental Status: She is alert and oriented to person, place, and time  Psychiatric:         Mood and Affect: Mood normal          Behavior: Behavior normal          Lab Results:   I have personally reviewed pertinent labs , PT/PTT:  Lab Results   Component Value Date    PTT 29 03/02/2022       Imaging Studies: I have personally reviewed pertinent reports  and I have personally reviewed pertinent films in PACS  EKG, Pathology, and Other Studies: I have personally reviewed pertinent reports  Please note that the APS provides consultative services regarding pain management only  With the exception of ketamine, peripheral nerve catheters, and epidural infusions (and except when indicated), final decisions regarding starting or changing doses of analgesic medications are at the discretion of the consulting service  Off hours consultation and/or medication management is generally not available      Regenia Bamberger, DO  Acute Pain Service

## 2022-03-02 NOTE — ASSESSMENT & PLAN NOTE
-hemoglobin stable at 11 6 this afternoon, will repeat hemoglobin in a m   -abdominal exam is benign  -likely secondary to localized rib fractures with displacement adjacent to the liver

## 2022-03-02 NOTE — PLAN OF CARE
Problem: PHYSICAL THERAPY ADULT  Goal: Performs mobility at highest level of function for planned discharge setting  See evaluation for individualized goals  Description: Treatment/Interventions: Functional transfer training,LE strengthening/ROM,Elevations,Therapeutic exercise,Endurance training,Patient/family training,Equipment eval/education,Bed mobility,Gait training,Spoke to nursing,OT  Equipment Recommended:  (continue to assess )       See flowsheet documentation for full assessment, interventions and recommendations  Note: Prognosis: Good  Problem List: Decreased strength,Decreased endurance,Impaired balance,Decreased mobility,Pain,Decreased safety awareness  Assessment: Pt is 78 y o  female seen for PT evaluation s/p admit to [facility] on 3/1/2022  Two pt identifiers were used to confirm  Pt presented w/ s/p ground level fall  Patient originally presented at [facility] and was transferred to [facility] for further medical management/ evaluation  Pt was admitted with a primary dx of:  Closed fracture of multiple ribs on right side, traumatic right hydropneumothorax, right L2-L3 transverse process fractures  PT now consulted for assessment of mobility and d/c needs  Pt with Up in chair orders  Pts current co morbidities affecting treatment include: Anxiety, asthma, chronic pain, depression, and personal factors including steps to manage at home and living alone  Pts current clinical presentation is Unstable/ Unpredictable (high complexity) due to Ongoing medical management for primary dx, Decreased activity tolerance compared to baseline, Fall risk, Increased assistance needed from caregiver at current time, Continuous pulse oximetry monitoring     Upon evaluation, pt currently is requiring mod Ax1 for bed mobility; Mod Ax1 for transfers and Mod Ax1 for ambulation w/ HHA   Pt presents at PT eval functioning below baseline and currently w/ overall mobility deficits 2* to: BLE weakness, impaired balance, decreased endurance, gait deviations, pain, decreased activity tolerance compared to baseline, decreased safety awareness, fall risk  Pt currently at a fall risk 2* to impairments listed above  Based on the aforementioned PT evaluation, pt will continue to benefit from skilled Acute PT interventions to address stated impairments; to maximize functional mobility; for ongoing pt/ family training; and DME needs  At conclusion of PT session pt returned back in chair and chair alarm engaged with phone and call bell within reach  Pt denies any further questions at this time  PT is currently recommending Rehab  PT will continue to follow during hospital stay  Barriers to Discharge: Decreased caregiver support        PT Discharge Recommendation: Post acute rehabilitation services          See flowsheet documentation for full assessment

## 2022-03-02 NOTE — ASSESSMENT & PLAN NOTE
- Acute pain secondary to traumatic injuries  - Appreciate APS evaluation and recommendations  Patient was offered an epidural catheter but declined  Will continue multimodal analgesic regimen at this time and resume home MSIR 15 mg q 12 in addition to p r n  Oxycodone and scheduled Tylenol, methocarbamol, gabapentin and Lidoderm patches  - Bowel regimen as long as using opioids   - Continue to monitor pain and adjust regimen as indicated

## 2022-03-02 NOTE — ASSESSMENT & PLAN NOTE
-x-ray completed, no obvious fracture  -pain control multimodal analgesic regimen  -may be referred pain from right-sided rib fractures as well

## 2022-03-02 NOTE — PHYSICAL THERAPY NOTE
PHYSICAL THERAPY EVALUATION  NAME:  Cisco Galvan  DATE: 03/02/22    AGE:   78 y o  Mrn:   [id number]  ADMIT DX:  Rib fracture [S22 39XA]  [facility] process of lumbar [facility] [S32 009A]    Past Medical History:   Diagnosis Date    Anxiety     Asthma     Chronic pain     Depression     Enterovirus heart infection        Past Surgical History:   Procedure Laterality Date    FIXATION KYPHOPLASTY LUMBAR SPINE      HYSTERECTOMY      REPAIR RECTOCELE         Length Of Stay: 1    PHYSICAL THERAPY EVALUATION:        03/02/22 1114   Note Type   Note type Evaluation   Pain Assessment   Pain Assessment Tool 0-10   Pain Score 8   Pain Location/Orientation Orientation: Right;Location: Rib Cage   Pain Onset/Description Onset: Ongoing;Frequency: Constant/Continuous; Descriptor: Aching   Effect of Pain on Daily Activities increased pain with activity    Patient's Stated Pain Goal No pain   Hospital Pain Intervention(s) Ambulation/increased activity;Repositioned   Restrictions/Precautions   Weight Bearing Precautions Per Order No   Other Precautions Cognitive; Chair Alarm; Bed Alarm; Fall Risk;Pain;Spinal precautions   Home Living   Type of [address] [place] [address] Two level; Able to live on main level with bedroom/bathroom;Stairs to enter with rails  (1 TAB, able to have FFSU )   Home Equipment Walker;Cane   Additional Comments Patient reports living alone, but states she has local family who could assist if needed   Prior Function   Level of [place] Independent with ADLs and functional mobility   Lives With [address] [place] [address] [place] in the last 6 months 1 to 4  (2 as per pt )   Comments Patient reports use of a single-point cane for ambulation prior to admission   General   Family/Caregiver Present No   Cognition   Overall Cognitive Status WFL   Arousal/Participation Alert   Attention Within functional limits   Orientation Level Oriented X4   Memory Within functional limits   Following Commands Follows one step commands without difficulty   RUE Assessment   RUE Assessment X   LUE Assessment   LUE Assessment WFL   RLE Assessment   RLE Assessment WFL   Strength RLE   RLE Overall Strength 4/5   LLE Assessment   LLE Assessment WFL   Strength LLE   LLE Overall Strength 4/5   Bed Mobility   Supine to Sit 3  Moderate assistance   Additional items Assist x 1; Increased time required;Verbal cues   Transfers   Sit to Stand 3  Moderate assistance   Additional items Assist x 1; Increased time required;Verbal cues   Stand to Sit 3  Moderate assistance   Additional items Assist x 1; Increased time required;Verbal cues   Additional Comments VC and TC needed for hand placement during transfers    Ambulation/Elevation   Gait pattern Short stride; Foward flexed; Inconsistent anna   Gait Assistance 3  Moderate assist   Additional items Assist x 1   Assistive Device Other (Comment)  (HHA )   Distance 5ft   (limited by fatigue and pain )   Balance   Static Sitting Fair   Static Standing Poor +   Ambulatory Poor   Endurance Deficit   Endurance Deficit Yes   Endurance Deficit Description fatigue, pain    Activity Tolerance   Activity Tolerance Patient limited by fatigue;Patient limited by pain   Medical Staff Made Aware Chise, OT; OT present for co evaluation due to pts current medical presentation, new traumatic injury, and decreased activity tolerance which all impact pts overall physical performance    Nurse Made Aware Patient appropriate to be seen and mobilized per nsg   Assessment   Prognosis Good   Problem List Decreased strength;Decreased endurance; Impaired balance;Decreased mobility;Pain;Decreased safety awareness   Assessment Pt is 78 y o  female seen for PT evaluation s/p admit to [facility] on 3/1/2022  Two pt identifiers were used to confirm  Pt presented w/ s/p ground level fall    Patient originally [facility] at [facility] and was [facility] to [facility] for further medical management/ evaluation  Pt was admitted with a primary dx of:  Closed fracture of multiple ribs on right side, traumatic right hydropneumothorax, right L2-L3 transverse process fractures  PT now consulted for assessment of mobility and d/c needs  Pt with Up in chair orders  Pts current co morbidities affecting treatment include: Anxiety, asthma, chronic pain, depression, and personal factors including steps to manage at home and living alone  Pts current clinical presentation is Unstable/ Unpredictable (high complexity) due to Ongoing medical management for primary dx, Decreased activity tolerance compared to baseline, Fall risk, Increased assistance needed from caregiver at current time, Continuous pulse oximetry monitoring     Upon evaluation, pt currently is requiring mod Ax1 for bed mobility; Mod Ax1 for transfers and Mod Ax1 for ambulation w/ HHA  Pt presents at PT eval functioning below baseline and currently w/ overall mobility deficits 2* to: BLE weakness, impaired balance, decreased endurance, gait deviations, pain, decreased activity tolerance compared to baseline, decreased safety awareness, fall risk  Pt currently at a fall risk 2* to impairments listed above  Based on the aforementioned PT evaluation, pt will continue to benefit from skilled Acute PT interventions to address stated impairments; to maximize functional mobility; for ongoing pt/ family training; and DME needs  At conclusion of PT session pt returned back in chair and chair alarm engaged with phone and call bell within reach  Pt denies any further questions at this time  PT is currently recommending Rehab  PT will continue to follow during hospital stay     Barriers to Discharge Decreased caregiver support   Goals   Patient Goals " to have less pain"   STG Expiration Date 03/12/22   Short Term Goal #1 In 10 days pt will complete: 1) Bed mobility skills with S to increase safety and independence as well as decrease caregiver burden  2) Functional transfers with S to promote increased independence, safety, and QOL  3) Ambulate 150' using least restrictive AD with S without LOB and stable vitals so that pt can negotiate previous living environment safely and promote independence with functional mobility and return to PLOF  4) Stair training up/ down 3 step/s using rail/s with S so that pt can enter/negotiate previous living environment safely and decrease fall risk  5) Improve balance grades by 1/2 grade to increase safety with all mobility and decrease fall risk  6) Improve BLE strength by 1/2 grade to help increase overall functional mobility and decrease fall risk  Plan   Treatment/Interventions Functional transfer training;LE strengthening/ROM; Elevations; Therapeutic exercise; Endurance training;Patient/family training;Equipment eval/education; Bed mobility;Gait training;Spoke to nursing;OT   PT Frequency Other (Comment)  (3-6x a week )   Recommendation   PT Discharge Recommendation Post acute rehabilitation services   Equipment Recommended   (continue to assess )   AM-PAC Basic Mobility Inpatient   Turning in Bed Without Bedrails 3   Lying on Back to Sitting on Edge of Flat Bed 2   Moving Bed to Chair 2   Standing Up From Chair 2   Walk in Room 2   Climb 3-5 Stairs 2   Basic Mobility Inpatient Raw Score 13   Basic Mobility Standardized Score 33 99   Highest Level Of Mobility   [facility] Goal 4: Move to chair/commode   Modified [place] Scale   Modified Carly Scale 4   Barthel Index   Feeding 10   Bathing 0   Grooming Score 5   Dressing Score 5   Bladder Score 10   Bowels Score 10   Toilet Use Score 5   Transfers (Bed/Chair) Score 5   Mobility (Level Surface) Score 0   Stairs Score 0   Barthel Index Score 50   Portions of the documentation may have been created using voice recognition software  Occasional wrong word or sound alike substitutions may have occurred due to the inherent limitations of the voice recognition software  Read the chart carefully and recognize, using context, where substitutions have occurred      James Downing, PT, DPT

## 2022-03-02 NOTE — MALNUTRITION/BMI
This medical record reflects one or more clinical indicators suggestive of malnutrition  Malnutrition Findings:   Adult Malnutrition type: Acute illness  Adult Degree of Malnutrition: Other severe protein calorie malnutrition (as evidence by moderate clavicle muscle wasting, moderate temple muscle wasting, mild dorsal hand muscle wasting, severe buccal fat loss  Treated with oral diet and nutrition supplementation)  Malnutrition Characteristics: Muscle loss,Fat loss      See Nutrition note dated 3/2/22 for additional details  Completed nutrition assessment is viewable in the nutrition documentation

## 2022-03-02 NOTE — TELEPHONE ENCOUNTER
Carmelo text sent to ordering provider david about the recons that were ordered,  Pt was not scanned at this location she was scanned at [facility]

## 2022-03-02 NOTE — OCCUPATIONAL THERAPY NOTE
Occupational Therapy Evaluation     Patient Name: Hilary Willson  [id number] Date: 3/2/2022  Problem List  Principal Problem:    Closed fracture of multiple ribs of right side  Active Problems:    Traumatic pneumothorax    Closed fracture of transverse process of lumbar vertebra [facility])    Liver contusion    Past Medical History  Past Medical History:   Diagnosis Date    Anxiety     Asthma     Chronic pain     Depression     Enterovirus heart infection      Past Surgical History  Past Surgical History:   Procedure Laterality Date    FIXATION KYPHOPLASTY LUMBAR SPINE      HYSTERECTOMY      REPAIR RECTOCELE           03/02/22 1113   OT Last Visit   OT Visit Date 03/02/22   Note Type   Note type Evaluation   Restrictions/Precautions   Weight Bearing Precautions Per Order No   Other Precautions Cognitive; Chair Alarm; Fall Risk;Pain   Pain Assessment   Pain Assessment Tool 0-10   Pain Score 8   Pain Location/Orientation Orientation: Right;Location: Rib Cage   Hospital Pain Intervention(s) Repositioned; Ambulation/increased activity; Emotional support   Home Living   Type of [address] [place] [address] Two level; Laundry in basement;Able to live on main level with bedroom/bathroom;Stairs to enter with rails  (1 TAB )   Bathroom Shower/Tub Tub/shower unit   Bathroom Toilet Standard   Bathroom Equipment Shower chair   Bathroom Accessibility Accessible   Home Equipment Walker;Cane   Additional Comments PT REPORTS OCCASIONAL USE OF SPC FOR COMMUNITY USE    Prior Function   Level of [place] Independent with ADLs and functional mobility   Lives With Alone   Receives Help From Family   ADL Assistance Independent   IADLs Independent   Falls in the last 6 months 1 to 4  (2 )   Vocational Retired   Lifestyle   Autonomy  [facility] ADLS/IADLS/DRIVING PTA   Reciprocal Relationships LIVES ALONE   SUPPORTIVE FAMILY LOCAL   Service to Others RETIRED   Intrinsic Gratification ENJOYS TALKING WITH FAMILY    Psychosocial Psychosocial (WDL) WDL   ADL   Eating Assistance 5  Supervision/Setup   Grooming Assistance 4  Minimal Assistance   UB Bathing Assistance 4  Minimal Assistance   LB Bathing Assistance 3  Moderate Assistance   UB Dressing Assistance 3  Moderate Assistance   LB Dressing Assistance 3  Moderate Assistance   Toileting Assistance  3  Moderate Assistance   Functional Assistance 3  Moderate Assistance   Bed Mobility   Supine to Sit 3  Moderate assistance   Additional items Assist x 1; Increased time required;Verbal cues;LE management   Sit to Supine Unable to assess  (PT LEFT OOB WITH ALL NEEDS IN REACH + ALARM ON )   Transfers   Sit to Stand 3  Moderate assistance   Additional items Assist x 1; Increased time required;Verbal cues   Stand to Sit 3  Moderate assistance   Additional items Assist x 1; Increased time required;Verbal cues   Functional Mobility   Functional Mobility 3  Moderate assistance   Additional items Hand hold assistance   Balance   Static Sitting Fair   Static Standing Poor +   Ambulatory Poor   Activity Tolerance   Activity Tolerance Patient limited by pain   Medical Staff Made Aware PT SEEN FOR CO-SESSION WITH SKILLED PHYSICAL THERAPIST 2' CLINICALLY UNSTABLE PRESENTATION, POLY-TRAUMATIC INJURIES, NEW PRECAUTIONS/LIMITATIONS, LIMITED ACTIVITY TOLERANCE AND PRESENT IMPAIRMENTS WHICH ARE A REGRESSION FROM THE PT'S BASELINE AND IMPACTING OVERALL OCCUPATIONAL PERFORMANCE  Nurse Made Aware APPROPRIATE TO SEE    RUE Assessment   RUE Assessment X  (PAINFUL 2' RIB PAIN VS R/O FX- IMAGING PENDINGING )   LUE Assessment   LUE Assessment WFL   Cognition   Overall Cognitive Status WFL   Arousal/Participation Alert; Cooperative   Attention Within functional limits   Orientation Level Oriented X4   Memory Within functional limits   Following Commands Follows multistep commands without difficulty   Comments PT IS OVERALL PLEASANT AND COOPERATIVE  REPORTS + HEADSTRIKE   ANXIOUS AT TIMES REQUIRED CUES TO ATTEND TO TASK + ENCOURAGEMENT T/O    Assessment   Limitation Decreased ADL status; Decreased Safe judgement during ADL;Decreased cognition;Decreased endurance;Decreased self-care trans;Decreased high-level ADLs   Prognosis Good   Assessment 79 YO Female SEEN FOR INITIAL OCCUPATIONAL THERAPY EVALUATION FOLLOWING TXF FROM SLA->SLB S/P FALL RESULTING IN R SIDED RIB FX, R HYDROPNEUMOTHORAX, R L2-3 TP FX, AND LIVER CONTUSION VS LIVER CYST  PROBLEMS LIST INCLUDES Anxiety, Asthma, Chronic pain, Depression, and Enterovirus heart infection  PT IS FROM HOME ALONE WHERE SHE REPORTS BEING INDEPENDENT WITH ADLS/IADLS/DRIVING PTA  PT CURRENTLY REQUIRES OVERALL MIN-MOD A WITH ADLS, AND MOD A WITH TRANSFERS /FUNCTIONAL MOBILITY WITH USE OF RW  PT IS LIMITED 2' PAIN, FATIGUE, IMPAIRED BALANCE, FALL RISK , OVERALL WEAKNESS/DECONDITIONING , SOB, LIMITED FAMILY/FRIEND SUPPORT , INACCESSIBLE HOME ENVIRONMENT and OVERALL LIMITED ACTIVITY TOLERANCE  PT EDUCATED ON DEEP BREATHING TECHNIQUES T/O ACTIVITY, SLOWING OF PACE, ENERGY CONSERVATION TECHNIQUES FOR CARRY OVER UPON D/C, INCREASED FAMILY SUPPORT and CONTINUE PARTICIPATION IN SELF-CARE/MOBILITY WITH STAFF [address]   The patient's raw score on the AM-PAC Daily Activity inpatient short form is 14, standardized score is 33 39, less than 39 4  Patients at this level are likely to benefit from discharge to post-acute rehabilitation services  Please refer to the recommendation of the Occupational Therapist for safe discharge planning  FROM AN OCCUPATIONAL THERAPY PERSPECTIVE, PT WOULD BENEFIT FROM ADDITIONAL OT SERVICES IN AN INPT REHAB SETTING UPON D/C  WILL CONT TO FOLLOW TO ADDRESS THE BELOW DESCRIBED GOALS  Goals   Patient Goals TO HAVE LESS PAIN    LTG Time Frame 10-14   Long Term Goal #1 SEE BELOW    Plan   Treatment Interventions ADL retraining;Functional transfer training; Endurance training;Cognitive reorientation;Patient/family training;Equipment evaluation/education; Compensatory technique education; Energy conservation; Activityengagement   Goal Expiration Date 03/16/22   OT Frequency 3-5x/wk   Recommendation   OT Discharge Recommendation Post acute rehabilitation services   OT - OK to Discharge Yes   AM-PAC Daily Activity Inpatient   Lower Body Dressing 2   Bathing 2   Toileting 2   Upper Body Dressing 2   Grooming 3   Eating 3   Daily Activity Raw Score 14   Daily Activity Standardized Score (Calc for Raw Score >=11) 33 39   AM-PAC Applied Cognition Inpatient   Following a Speech/Presentation 3   Understanding Ordinary Conversation 4   Taking Medications 4   Remembering Where Things Are Placed or Put Away 4   Remembering List of 4-5 Errands 4   Taking Care of Complicated Tasks 3   Applied Cognition Raw Score 22   Applied Cognition Standardized Score 47 83   Modified [place] Scale   Modified Carly Scale 4       OCCUPATIONAL THERAPY GOALS TO BE MET WITHIN 14 DAYS:    -Pt will increase bed mobility to MOD I to participate in functional activities with G tolerance and balance  -Pt will improve functional mobility and transfers to MOD I on/off all surfaces w/ G balance/safety including toileting   -Pt will participate in lt grooming task with MOD I after set-up standing at sink ~3-5 minutes with G safety and balance  -Pt will increase independence in all ADLS to MOD I with G balance sitting upright in chair   -Pt will improve activity tolerance to G for 30 min txment sessions w/ G carry over of learned energy conservation techniques   -Pt will improve independence in lt homemaking activities to MOD I without requiring cues for safety   -Pt will demonstrate G carryover of learned safety techniques and proper body mechanics in functional and leisure activities with use of DME   -Pt will complete additional cognitive assessment with 100% attention to task in order to assist with safe d/c plan         Documentation completed by [place], [address] [facility], OTR/L  [facility] Certified ID# [id number]

## 2022-03-02 NOTE — ASSESSMENT & PLAN NOTE
- seen on admission CT chest  - f/u CXR on 3/2 shows no obvious PTX  Will f/u official read     - encourage pulmonary toilette/IS

## 2022-03-03 ENCOUNTER — ANESTHESIA (INPATIENT)
Dept: ANESTHESIOLOGY | Facility: HOSPITAL | Age: 67
DRG: 964 | End: 2022-03-03
Payer: MEDICARE

## 2022-03-03 ENCOUNTER — APPOINTMENT (OUTPATIENT)
Dept: SURGERY | Facility: HOSPITAL | Age: 67
DRG: 964 | End: 2022-03-03
Payer: MEDICARE

## 2022-03-03 ENCOUNTER — ANESTHESIA EVENT (INPATIENT)
Dept: ANESTHESIOLOGY | Facility: HOSPITAL | Age: 67
DRG: 964 | End: 2022-03-03
Payer: MEDICARE

## 2022-03-03 LAB
ALBUMIN SERPL BCP-MCNC: 2.6 G/DL (ref 3.5–5)
ALP SERPL-CCNC: 60 U/L (ref 46–116)
ALT SERPL W P-5'-P-CCNC: 15 U/L (ref 12–78)
ANION GAP SERPL CALCULATED.3IONS-SCNC: 5 MMOL/L (ref 4–13)
AST SERPL W P-5'-P-CCNC: 20 U/L (ref 5–45)
BASOPHILS # BLD AUTO: 0.01 THOUSANDS/ΜL (ref 0–0.1)
BASOPHILS NFR BLD AUTO: 0 % (ref 0–1)
BILIRUB SERPL-MCNC: 0.51 MG/DL (ref 0.2–1)
BUN SERPL-MCNC: 11 MG/DL (ref 5–25)
CALCIUM ALBUM COR SERPL-MCNC: 10.9 MG/DL (ref 8.3–10.1)
CALCIUM SERPL-MCNC: 9.8 MG/DL (ref 8.3–10.1)
CHLORIDE SERPL-SCNC: 108 MMOL/L (ref 100–108)
CO2 SERPL-SCNC: 26 MMOL/L (ref 21–32)
CREAT SERPL-MCNC: 0.93 MG/DL (ref 0.6–1.3)
EOSINOPHIL # BLD AUTO: 0 THOUSAND/ΜL (ref 0–0.61)
EOSINOPHIL NFR BLD AUTO: 0 % (ref 0–6)
ERYTHROCYTE [DISTWIDTH] IN BLOOD BY AUTOMATED COUNT: 13.3 % (ref 11.6–15.1)
GFR SERPL CREATININE-BSD FRML MDRD: 63 ML/MIN/1.73SQ M
GLUCOSE SERPL-MCNC: 89 MG/DL (ref 65–140)
HCT VFR BLD AUTO: 29.7 % (ref 34.8–46.1)
HGB BLD-MCNC: 10.2 G/DL (ref 11.5–15.4)
IMM GRANULOCYTES # BLD AUTO: 0.01 THOUSAND/UL (ref 0–0.2)
IMM GRANULOCYTES NFR BLD AUTO: 0 % (ref 0–2)
LYMPHOCYTES # BLD AUTO: 1.63 THOUSANDS/ΜL (ref 0.6–4.47)
LYMPHOCYTES NFR BLD AUTO: 38 % (ref 14–44)
MCH RBC QN AUTO: 30.2 PG (ref 26.8–34.3)
MCHC RBC AUTO-ENTMCNC: 34.3 G/DL (ref 31.4–37.4)
MCV RBC AUTO: 88 FL (ref 82–98)
MONOCYTES # BLD AUTO: 0.51 THOUSAND/ΜL (ref 0.17–1.22)
MONOCYTES NFR BLD AUTO: 12 % (ref 4–12)
NEUTROPHILS # BLD AUTO: 2.11 THOUSANDS/ΜL (ref 1.85–7.62)
NEUTS SEG NFR BLD AUTO: 50 % (ref 43–75)
NRBC BLD AUTO-RTO: 0 /100 WBCS
PLATELET # BLD AUTO: 164 THOUSANDS/UL (ref 149–390)
PMV BLD AUTO: 10.9 FL (ref 8.9–12.7)
POTASSIUM SERPL-SCNC: 3.8 MMOL/L (ref 3.5–5.3)
PROT SERPL-MCNC: 5.6 G/DL (ref 6.4–8.2)
RBC # BLD AUTO: 3.38 MILLION/UL (ref 3.81–5.12)
SODIUM SERPL-SCNC: 139 MMOL/L (ref 136–145)
WBC # BLD AUTO: 4.27 THOUSAND/UL (ref 4.31–10.16)

## 2022-03-03 PROCEDURE — 99223 1ST HOSP IP/OBS HIGH 75: CPT | Performed by: SURGERY

## 2022-03-03 PROCEDURE — 80053 COMPREHEN METABOLIC PANEL: CPT | Performed by: SURGERY

## 2022-03-03 PROCEDURE — 99232 SBSQ HOSP IP/OBS MODERATE 35: CPT | Performed by: SURGERY

## 2022-03-03 PROCEDURE — 85025 COMPLETE CBC W/AUTO DIFF WBC: CPT | Performed by: SURGERY

## 2022-03-03 RX ORDER — LORAZEPAM 1 MG/1
1 TABLET ORAL
Status: DISCONTINUED | OUTPATIENT
Start: 2022-03-03 | End: 2022-03-08 | Stop reason: HOSPADM

## 2022-03-03 RX ORDER — LIDOCAINE HYDROCHLORIDE AND EPINEPHRINE 15; 5 MG/ML; UG/ML
INJECTION, SOLUTION EPIDURAL
Status: COMPLETED | OUTPATIENT
Start: 2022-03-03 | End: 2022-03-03

## 2022-03-03 RX ORDER — HEPARIN SODIUM 5000 [USP'U]/ML
5000 INJECTION, SOLUTION INTRAVENOUS; SUBCUTANEOUS EVERY 8 HOURS SCHEDULED
Status: DISCONTINUED | OUTPATIENT
Start: 2022-03-03 | End: 2022-03-06

## 2022-03-03 RX ADMIN — ROPIVACAINE HYDROCHLORIDE: 2 INJECTION, SOLUTION EPIDURAL; INFILTRATION at 14:32

## 2022-03-03 RX ADMIN — MORPHINE SULFATE 15 MG: 15 TABLET, FILM COATED, EXTENDED RELEASE ORAL at 17:28

## 2022-03-03 RX ADMIN — ACETAMINOPHEN 975 MG: 325 TABLET ORAL at 21:12

## 2022-03-03 RX ADMIN — DOCUSATE SODIUM 100 MG: 100 CAPSULE ORAL at 10:03

## 2022-03-03 RX ADMIN — METHOCARBAMOL 500 MG: 500 TABLET ORAL at 17:28

## 2022-03-03 RX ADMIN — METHOCARBAMOL 500 MG: 500 TABLET ORAL at 05:04

## 2022-03-03 RX ADMIN — HYDROMORPHONE HYDROCHLORIDE 0.5 MG: 1 INJECTION, SOLUTION INTRAMUSCULAR; INTRAVENOUS; SUBCUTANEOUS at 01:12

## 2022-03-03 RX ADMIN — GABAPENTIN 200 MG: 100 CAPSULE ORAL at 21:12

## 2022-03-03 RX ADMIN — DOCUSATE SODIUM 100 MG: 100 CAPSULE ORAL at 17:28

## 2022-03-03 RX ADMIN — ACETAMINOPHEN 975 MG: 325 TABLET ORAL at 14:20

## 2022-03-03 RX ADMIN — HEPARIN SODIUM 5000 UNITS: 5000 INJECTION INTRAVENOUS; SUBCUTANEOUS at 21:12

## 2022-03-03 RX ADMIN — LIDOCAINE HYDROCHLORIDE AND EPINEPHRINE 5 ML: 15; 5 INJECTION, SOLUTION EPIDURAL at 16:53

## 2022-03-03 RX ADMIN — OXYCODONE HYDROCHLORIDE 10 MG: 10 TABLET ORAL at 11:08

## 2022-03-03 RX ADMIN — GABAPENTIN 200 MG: 100 CAPSULE ORAL at 17:28

## 2022-03-03 RX ADMIN — ACETAMINOPHEN 975 MG: 325 TABLET ORAL at 05:05

## 2022-03-03 RX ADMIN — LORAZEPAM 1 MG: 1 TABLET ORAL at 21:12

## 2022-03-03 RX ADMIN — GABAPENTIN 200 MG: 100 CAPSULE ORAL at 10:03

## 2022-03-03 RX ADMIN — METHOCARBAMOL 500 MG: 500 TABLET ORAL at 11:08

## 2022-03-03 RX ADMIN — MORPHINE SULFATE 15 MG: 15 TABLET, FILM COATED, EXTENDED RELEASE ORAL at 05:05

## 2022-03-03 RX ADMIN — LIDOCAINE 5% 1 PATCH: 700 PATCH TOPICAL at 10:01

## 2022-03-03 NOTE — CASE MANAGEMENT
Case Management Discharge Planning Note    Patient name Nury Barrett  Location [address] [place] [address] [id number] MRN [id number]  : 1955 Date 3/3/2022       Current Admission Date: 3/1/2022  Current Admission Diagnosis:Closed fracture of multiple ribs of right side   Patient Active Problem List    Diagnosis Date Noted    Closed fracture of multiple ribs of right side 2022    Traumatic pneumothorax 2022    Closed fracture of transverse process of lumbar vertebra ([facility] ) 2022    Liver contusion 2022    Fall 2022    Acute pain due to trauma 2022    Right shoulder pain 2022    Severe protein-calorie malnutrition ([facility] ) 2022    Undifferentiated connective tissue disease ([facility] ) 2021    Primary generalized (osteo)arthritis 2021    Peripheral neuropathy 2021    Senile osteoporosis 2021    Hx of compression fracture of spine 2021    Lumbar spondylosis 2021    Cervical spondylosis 2021    Anemia 2021    Chronic pain syndrome     Asthma     Ambulatory dysfunction     Acute metabolic encephalopathy     Acute cystitis without hematuria     Seronegative arthropathy of multiple sites ([facility] )     Gastroesophageal reflux disease without esophagitis     Mixed stress and urge urinary incontinence     CHF (congestive heart failure) (HCC)       LOS (days): 2  Geometric Mean LOS (GMLOS) (days): 4 30  Days to GMLOS:2 6     OBJECTIVE:  Risk of Unplanned Readmission Score: 12         Current admission status: Inpatient   Preferred Pharmacy:   [address] [place] [id number]  Phone: [phone] Fax: [phone]    Primary Care Provider: Marzena Lombardo DO    Primary Insurance: MEDICARE  Secondary Insurance: [facility]    DISCHARGE DETAILS:       Pt had a bed available for her at [facility], once medically stable   Kyle is requesting to see if pt is agreeable to COVID booster, as they state this would allow her to admit without the need for quarantine  CM discussed with pt but she is unsure about the decision   CM will follow up in the AM

## 2022-03-03 NOTE — ANESTHESIA PROCEDURE NOTES
Epidural Block    Patient location during procedure: floor  Start time: 3/3/2022 12:50 PM  Staffing  Performed: CRNA   Anesthesiologist: Montrell Romero DO  Resident/CRNA: Leo Garduno CRNA  Preanesthetic Checklist  Completed: patient identified, IV checked, site marked, risks and benefits discussed, monitors and equipment checked and timeout performed  Epidural  Patient position: sitting  Prep: ChloraPrep  Patient monitoring: heart rate, cardiac monitor, continuous pulse ox and frequent blood pressure checks  Approach: midline  Location: thoracic  Injection technique: NOEL saline  Needle  Needle type: Tuohy   Needle gauge: 17 G  Catheter type: side hole  Catheter size: 19 G  Catheter at skin depth: 9 cm  Catheter securement method: stabilization device  Test dose: negativelidocaine 1 5% with epinephrine 1:200,000 test dose, 5 mL (3 then 2)  Assessment  Number of attempts: 1  patient tolerated the procedure well with no immediate complications  Additional Notes  One attempt by CRNA  Smooth loss or resistance to saline at 4cm  No complications   Catheter left at 9cm at the skin

## 2022-03-03 NOTE — ASSESSMENT & PLAN NOTE
- Acute pain secondary to traumatic injuries  - Appreciate APS evaluation and recommendations  Patient was offered an epidural catheter but declined on 03/02  This morning, 3/30 patient is in agreement with epidural catheter due to ongoing pain  Anesthesia informed  Will continue multimodal analgesic regimen at this time and resume home MSIR 15 mg q 12 in addition to p r n  Oxycodone and scheduled Tylenol, methocarbamol, gabapentin and Lidoderm patches  - Bowel regimen as long as using opioids   - Continue to monitor pain and adjust regimen as indicated

## 2022-03-03 NOTE — PROGRESS NOTES
Progress Note - Acute Pain Service    Lonzell Siemens 78 y o  female MRN: [id number]  Unit/Bed#: [facility] 620-01 Encounter: [id number]      Assessment:   Principal Problem:    Closed fracture of multiple ribs of right side  Active Problems:    Traumatic pneumothorax    Closed fracture of transverse process of lumbar vertebra [facility])    Liver contusion    Fall    Acute pain due to trauma    Right shoulder pain    Severe protein-calorie malnutrition ([facility] [address] )    Alex Pierson is a 78 y o  female with severe acute chest wall pain from displaced right 10-12 rib fractures after a fall  Patient is a good candidate for epidural vs  Peripheral nerve blocks and the risks and benefits of each procedure were discussed 3/2, but patient wanted more time to weigh her options  This morning the patient complains of continued 8-9/10 right sided sharp chest wall pain  She is still not requiring any supplemental O2  She can get up to 750ml on her ISB  Patient would like to have an epidural or peripheral nerve block placed today  Plan:   - Will attempt to place an epidural today for acute rib fracture pain  Placement may be difficult due to degenerative changes in her spine and scoliosis  If unable to be placed, a paravertebral or other peripheral nerve block will be performed instead  - Continue home dosing of MSContin 15mg PO q12h  Patient takes this medication chronically for neck and back pain  Patient will have increased tolerance to opioid medications      - Multimodal analgesia with:  Oxycodone 5/10mg PO q4h PRN moderate/severe pain, hydromorphone 0 5mg IV q4h PRN breakthrough, acetaminophen 975mg PO q8h, lidocaine patches, methocarbamol 500mg PO q6h, gabapentin 200mg PO TID    -Bowel regimen per primary team    APS will continue to follow  Please contact Acute Pain Service - [facility] via [employer] from [phone] with additional questions or concerns   See Lyndsey or Riley for additional contacts and after hours information  Pain History  Current pain location(s): right chest wall  Pain Scale:   8-9/10  Quality: sharp  24 hour history: severe pain    Opioid requirement previous 24 hours: hydromorphone 0 5mg x Oxycodone 10mg x1, MSContin 15mg x1    Meds/Allergies   all current active meds have been reviewed and current meds:   Current Facility-Administered Medications   Medication Dose Route Frequency    acetaminophen (TYLENOL) tablet 975 mg  975 mg Oral Q8H [address]    albuterol inhalation solution 2 5 mg  2 5 mg Nebulization Q6H PRN    docusate sodium (COLACE) capsule 100 mg  100 mg Oral BID    gabapentin (NEURONTIN) capsule 200 mg  200 mg Oral TID    HYDROmorphone (DILAUDID) injection 0 5 mg  0 5 mg Intravenous Q4H PRN    lidocaine (LIDODERM) 5 % patch 1 patch  1 patch Topical Daily    methocarbamol (ROBAXIN) tablet 500 mg  500 mg Oral Q6H [address]    morphine (MS CONTIN) ER tablet 15 mg  15 mg Oral Q12H [address]    ondansetron (ZOFRAN) injection 4 mg  4 mg Intravenous Q6H PRN    oxyCODONE (ROXICODONE) immediate release tablet 10 mg  10 mg Oral Q4H PRN    oxyCODONE (ROXICODONE) IR tablet 5 mg  5 mg Oral Q4H PRN    senna (SENOKOT) tablet 17 2 mg  2 tablet Oral Daily    sodium chloride (OCEAN) 0 65 % nasal spray 1 spray  1 spray Each Nare Q2H PRN       Allergies   Allergen Reactions    Penicillins        Objective     Temp:  [97 3 °F (36 3 °C)-98 2 °F (36 8 °C)] 97 5 °F (36 4 °C)  HR:  [79-99] 99  Resp:  [16-18] 16  BP: (116-136)/(72-83) 136/83    Physical Exam  Vitals reviewed  Constitutional:       General: She is not in acute distress  Eyes:      Extraocular Movements: Extraocular movements intact  Pupils: Pupils are equal, round, and reactive to light  Pulmonary:      Effort: Pulmonary effort is normal  No respiratory distress  Breath sounds: No stridor  Musculoskeletal:         General: Tenderness present  Neurological:      General: No focal deficit present        Mental Status: She is alert and oriented to person, place, and time  Psychiatric:         Mood and Affect: Mood normal          Behavior: Behavior normal          Lab Results:   Results from last 7 days   Lab Units 03/03/22  0445   WBC Thousand/uL 4 27*   HEMOGLOBIN g/dL 10 2*   HEMATOCRIT % 29 7*   PLATELETS Thousands/uL 164      Results from last 7 days   Lab Units 03/03/22  0545   POTASSIUM mmol/L 3 8   CHLORIDE mmol/L 108   CO2 mmol/L 26   BUN mg/dL 11   CREATININE mg/dL 0 93   CALCIUM mg/dL 9 8   ALK PHOS U/L 60   ALT U/L 15   AST U/L 20       Imaging Studies: I have personally reviewed pertinent reports  EKG, Pathology, and Other Studies: I have personally reviewed pertinent reports  Please note that the APS provides consultative services regarding pain management only  With the exception of ketamine and epidural infusions and except when indicated, final decisions regarding starting or changing doses of analgesic medications are at the discretion of the consulting service  Off hours consultation and/or medication management is generally not available      Amber Davis DO  Acute Pain Service

## 2022-03-03 NOTE — ASSESSMENT & PLAN NOTE
Malnutrition Findings:   Adult Malnutrition type: Acute illness  Adult Degree of Malnutrition: Other severe protein calorie malnutrition (as evidence by moderate clavicle muscle wasting, moderate temple muscle wasting, mild dorsal hand muscle wasting, severe buccal fat loss  Treated with oral diet and nutrition supplementation)    BMI Findings: Body mass index is 21 97 kg/m²     Diet with supplements  Nutrition consult

## 2022-03-03 NOTE — PLAN OF CARE
Problem: Potential for Falls  Goal: Patient will remain free of falls  Description: INTERVENTIONS:  - Educate patient/family on patient safety including physical limitations  - Instruct patient to call for assistance with activity   - Consult OT/PT to assist with strengthening/mobility   - Keep Call bell within reach  - Keep bed low and locked with side rails adjusted as appropriate  - Keep care items and personal belongings within reach  - Initiate and maintain comfort rounds  - Make Fall Risk Sign visible to staff  - Offer Toileting every **2* Hours, in advance of need  - Initiate/Maintain **bed/chair*alarm  - Obtain necessary fall risk management equipment:   - Apply yellow socks and bracelet for high fall risk patients  - Consider moving patient to room near nurses station  Outcome: Progressing     Problem: MOBILITY - ADULT  Goal: Maintain or return to baseline ADL function  Description: INTERVENTIONS:  -  Assess patient's ability to carry out ADLs; assess patient's baseline for ADL function and identify physical deficits which impact ability to perform ADLs (bathing, care of mouth/teeth, toileting, grooming, dressing, etc )  - Assess/evaluate cause of self-care deficits   - Assess range of motion  - Assess patient's mobility; develop plan if impaired  - Assess patient's need for assistive devices and provide as appropriate  - Encourage maximum independence but intervene and supervise when necessary  - Involve family in performance of ADLs  - Assess for home care needs following discharge   - Consider OT consult to assist with ADL evaluation and planning for discharge  - Provide patient education as appropriate  Outcome: Progressing  Goal: Maintains/Returns to pre admission functional level  Description: INTERVENTIONS:  - Perform BMAT or MOVE assessment daily    - Set and communicate daily mobility goal to care team and patient/family/caregiver     - Collaborate with rehabilitation services on mobility goals if consulted  - Perform Range of Motion **3* times a day  - Reposition patient every *2** hours  - Dangle patient *3** times a day  - Stand patient *3** times a day  - Ambulate patient **3* times a day  - Out of bed to chair *3** times a day   - Out of bed for meals **3* times a day  - Out of bed for toileting  - Record patient progress and toleration of activity level   Outcome: Progressing     Problem: Prexisting or High Potential for Compromised Skin Integrity  Goal: Skin integrity is maintained or improved  Description: INTERVENTIONS:  - Identify patients at risk for skin breakdown  - Assess and monitor skin integrity  - Assess and monitor nutrition and hydration status  - Monitor labs   - Assess for incontinence   - Turn and reposition patient  - Assist with mobility/ambulation  - Relieve pressure over bony prominences  - Avoid friction and shearing  - Provide appropriate hygiene as needed including keeping skin clean and dry  - Evaluate need for skin moisturizer/barrier cream  - Collaborate with interdisciplinary team   - Patient/family teaching  - Consider wound care consult   Outcome: Progressing     Problem: PAIN - ADULT  Goal: Verbalizes/displays adequate comfort level or baseline comfort level  Description: Interventions:  - Encourage patient to monitor pain and request assistance  - Assess pain using appropriate pain scale  - Administer analgesics based on type and severity of pain and evaluate response  - Implement non-pharmacological measures as appropriate and evaluate response  - Consider cultural and social influences on pain and pain management  - Notify physician/advanced practitioner if interventions unsuccessful or patient reports new pain  Outcome: Progressing     Problem: Nutrition/Hydration-ADULT  Goal: Nutrient/Hydration intake appropriate for improving, restoring or maintaining nutritional needs  Description: Monitor and assess patient's nutrition/hydration status for malnutrition  Collaborate with interdisciplinary team and initiate plan and interventions as ordered  Monitor patient's weight and dietary intake as ordered or per policy  Utilize nutrition screening tool and intervene as necessary  Determine patient's food preferences and provide high-protein, high-caloric foods as appropriate       INTERVENTIONS:  - Monitor oral intake, urinary output, labs, and treatment plans  - Assess nutrition and hydration status and recommend course of action  - Evaluate amount of meals eaten  - Assist patient with eating if necessary   - Allow adequate time for meals  - Recommend/ encourage appropriate diets, oral nutritional supplements, and vitamin/mineral supplements  - Order, calculate, and assess calorie counts as needed  - Recommend, monitor, and adjust tube feedings and TPN/PPN based on assessed needs  - Assess need for intravenous fluids  - Provide specific nutrition/hydration education as appropriate  - Include patient/family/caregiver in decisions related to nutrition  Outcome: Progressing

## 2022-03-03 NOTE — ASSESSMENT & PLAN NOTE
- Multiple right-sided rib fractures (10-12th), present on admission   - Continue rib fracture protocol   - Continue to encourage incentive spirometer use and adequate pulmonary hygiene  Currently pulling 750 mL on I S   - Continue multimodal analgesic regimen  Appreciate APS evaluation and recommendations  Patient agreeable today for an epidural catheter today  SQH was held this morning in preparation - APS informed  - Supplemental oxygen via nasal cannula as needed to maintain saturations greater than or equal to 94%  Currently on room air    - Repeat chest x-ray from 3/2 reviewed showing small apical pneumothorax and small effusion  Repeat chest x-ray in the evening of 3/2 reveals small stable right apical pneumothorax and effusion    - PT and OT evaluation and treatment as indicated  - Outpatient follow-up in the trauma clinic for re-evaluation in approximately 2 weeks

## 2022-03-03 NOTE — ASSESSMENT & PLAN NOTE
-x-ray completed, no fracture  -pain control multimodal analgesic regimen  -may be referred pain from right-sided rib fractures as well  -PT OT

## 2022-03-03 NOTE — ASSESSMENT & PLAN NOTE
-hemoglobin was 10 2 this morning which is stable from hemoglobin yesterday morning of 10 5    Abdomen is soft and nondistended with benign exam   -continue abdominal exam and repeat hemoglobin in a m   -likely secondary to localized rib fractures with displacement adjacent to the liver

## 2022-03-03 NOTE — PROGRESS NOTES
[address] [facility]  Progress Note - Henri Vo 1955, 78 y o  female MRN: [id number]  Unit/Bed#: [facility] 620-01 Encounter: [id number]  Primary Care Provider: Neelam Batista DO   Date and time admitted to hospital: 3/1/2022 11:32 PM    Fall  Assessment & Plan  - mechanical fall  -sustained the below stated injuries  -PT OT evaluations recommending inpatient rehab  -case management for dispo planning    * Closed fracture of multiple ribs of right side  Assessment & Plan  - Multiple right-sided rib fractures (10-12th), present on admission   - Continue rib fracture protocol   - Continue to encourage incentive spirometer use and adequate pulmonary hygiene  Currently pulling 750 mL on I S   - Continue multimodal analgesic regimen  Appreciate APS evaluation and recommendations  Patient agreeable today for an epidural catheter today  SQH was held this morning in preparation - APS informed  - Supplemental oxygen via nasal cannula as needed to maintain saturations greater than or equal to 94%  Currently on room air    - Repeat chest x-ray from 3/2 reviewed showing small apical pneumothorax and small effusion  Repeat chest x-ray in the evening of 3/2 reveals small stable right apical pneumothorax and effusion    - PT and OT evaluation and treatment as indicated  - Outpatient follow-up in the trauma clinic for re-evaluation in approximately 2 weeks  Traumatic pneumothorax  Assessment & Plan  - seen on admission CT chest  - f/u CXR on 3/2 shows small apical pneumothorax and effusion    Repeat chest x-ray approximately 8 hours later reveals stable small apical pneumothorax and effusion   -saturating in the high 90s on room air, no supplemental oxygen required  - encourage pulmonary toilette/IS    Closed fracture of transverse process of lumbar vertebra (HCC)  Assessment & Plan  - pain control with multimodal analgesic regimen  - quick draw brace deferred due to right-sided 10th Javid Blum [facility] 
[address] [facility], [id number] [place] [address] 
[phone] Medical Progress Note NAME: Sima Weller :  1929 MRM:  [id number] Date/Time: 2019  2:11 PM 
 
  
Subjective: Chief Complaint:  \"I'm okay\" Pt without complaints ROS: 
(bold if positive, if negative) Objective:  
 
 
Vitals:  
 
 
  
Last 24hrs VS reviewed since prior progress note. Most recent are: 
 
Visit Vitals /74 Pulse 82 Temp 98.5 °F (36.9 °C) Resp 15 Wt 60.6 kg (133 lb 9.6 oz) SpO2 94% BMI 20.92 kg/m² SpO2 Readings from Last 6 Encounters:  
19 94% 19 96% 19 95% 18 96% 18 96% 10/08/18 98% O2 Flow Rate (L/min): 2 l/min Intake/Output Summary (Last 24 hours) at 2019 1411 Last data filed at 2019 7126 Gross per 24 hour Intake 3003.5 ml Output 575 ml Net 2428. 5 ml Exam:  
 
Physical Exam: 
Gen:  Well-developed, well-nourished, in no acute distress HEENT:  Pink conjunctivae, PERRL, hearing intact to voice, moist mucous membranes Neck:  Supple, without masses, thyroid non-tender Resp:  No accessory muscle use, clear breath sounds without wheezes rales or rhonchi 
Card:  No murmurs, normal S1, S2 without thrills, bruits or peripheral edema Abd:  Soft, non-tender, non-distended, normoactive bowel sounds are present Musc:  No cyanosis or clubbing Skin:  No rashes or ulcers, skin turgor is good Neuro:  Cranial nerves 3-12 are grossly intact,  strength is 5/5 bilaterally and dorsi / plantarflexion is 5/5 bilaterally, follows commands appropriately Psych:  Good insight, oriented to person, place and time, alert Medications Reviewed: (see below) Lab Data Reviewed: (see below) 
 
______________________________________________________________________ Medications:  
 
Current Facility-Administered Medications Medication Dose Route Frequency through 12th rib fractures which will likely interfere with bracing  - no intervention or neurosurgical evaluation necessary    Liver contusion  Assessment & Plan  -hemoglobin was 10 2 this morning which is stable from hemoglobin yesterday morning of 10 5  Abdomen is soft and nondistended with benign exam   -continue abdominal exam and repeat hemoglobin in a m   -likely secondary to localized rib fractures with displacement adjacent to the liver    Acute pain due to trauma  Assessment & Plan  - Acute pain secondary to traumatic injuries  - Appreciate APS evaluation and recommendations  Patient was offered an epidural catheter but declined on 03/02  This morning, 3/30 patient is in agreement with epidural catheter due to ongoing pain  Anesthesia informed  Will continue multimodal analgesic regimen at this time and resume home MSIR 15 mg q 12 in addition to p r n  Oxycodone and scheduled Tylenol, methocarbamol, gabapentin and Lidoderm patches  - Bowel regimen as long as using opioids   - Continue to monitor pain and adjust regimen as indicated  Severe protein-calorie malnutrition ([facility] [address] )  Assessment & Plan  Malnutrition Findings:   Adult Malnutrition type: Acute illness  Adult Degree of Malnutrition: Other severe protein calorie malnutrition (as evidence by moderate clavicle muscle wasting, moderate temple muscle wasting, mild dorsal hand muscle wasting, severe buccal fat loss  Treated with oral diet and nutrition supplementation)    BMI Findings: Body mass index is 21 97 kg/m²  Diet with supplements  Nutrition consult    Right shoulder pain  Assessment & Plan  -x-ray completed, no fracture  -pain control multimodal analgesic regimen  -may be referred pain from right-sided rib fractures as well  -PT OT           Disposition:  Continue med surge status, epidural catheter placement today, patient will ultimately require rehab placement    Case management is following    SUBJECTIVE:  Chief Complaint:  apixaban (ELIQUIS) tablet 5 mg  5 mg Oral BID  atenolol (TENORMIN) tablet 12.5 mg  12.5 mg Oral DAILY  levothyroxine (SYNTHROID) tablet 100 mcg  100 mcg Oral ACB  montelukast (SINGULAIR) tablet 10 mg  10 mg Oral DAILY  amiodarone (CORDARONE) tablet 200 mg  200 mg Oral DAILY  0.9% sodium chloride infusion  75 mL/hr IntraVENous CONTINUOUS  
 sodium chloride (NS) flush 5-40 mL  5-40 mL IntraVENous Q8H  
 sodium chloride (NS) flush 5-40 mL  5-40 mL IntraVENous PRN  
 acetaminophen (TYLENOL) tablet 650 mg  650 mg Oral Q4H PRN  
 oxyCODONE-acetaminophen (PERCOCET) 5-325 mg per tablet 1 Tab  1 Tab Oral Q4H PRN  
 HYDROmorphone (PF) (DILAUDID) injection 0.5 mg  0.5 mg IntraVENous Q4H PRN  prochlorperazine (COMPAZINE) injection 10 mg  10 mg IntraVENous Q6H PRN  
 lactobac ac& pc-s.therm-b.anim (WILLA Q/RISAQUAD)  1 Cap Oral DAILY Lab Review:  
 
Recent Labs  
  02/17/19 
0235 02/16/19 
1736 WBC 3.2* 2.5* HGB 10.8* 12.1 HCT 33.8* 38.4 * 150 Recent Labs  
  02/17/19 
0235 02/16/19 
1736  136  
K 3.5 4.0  
 100 CO2 25 22 * 278* BUN 20 19 CREA 0.83 1.26* CA 7.6* 8.4* MG 1.6 2.0 PHOS 3.6  --   
ALB  --  3.3* SGOT  --  70* ALT  --  31 INR  --  1.1 No components found for: [place] Assessment / Plan:  
Ventricular tachycardia ([facility] [address] (2/16/2019) - s/p successful resuscitation 
-s/p amio gtt  
-start amiodarone PO  
-CE negative  
-TTE pending  
-interrogate pacer HTN (hypertension) () 
-resume atenolol (home dose)  
  
  CAD (coronary artery disease) () 
-on beta blocker, Eliquis (likely not on ASA because of this)  
  Atrial fibrillation (HCC) () 
-on Eliquis Elevated serum creatinine (2/16/2019) - mild. Improved with IVF's Total time spent with patient: 35 Minutes Care Plan discussed with: Patient and Family Discussed:  Care plan Prophylaxis:  Eliquis Disposition:  Home w/Family ___________________________________________________ Attending Physician: Nora Goldman, MD  
 
 I am in a lot of pain    Subjective:  Patient notes significant pain in her right chest wall lower ribs  She states she feels the moving and shifting around when she moves which causes her significant pain  She is agreeable to receiving an epidural catheter today she understands that should help with her pain  She denies any new areas of pain  She has minimal abdominal pain on the right side which is unchanged  She denies nausea or vomiting  She was unable to sleep well last night due to discomfort  She has been tolerating a diet  OBJECTIVE:   Vitals:   Temp:  [97 3 °F (36 3 °C)-98 2 °F (36 8 °C)] 97 4 °F (36 3 °C)  HR:  [79-89] 79  Resp:  [16-18] 16  BP: (116-132)/(72-78) 129/72    Intake/Output:  I/O       03/01 0701  03/02 0700 03/02 0701  03/03 0700 03/03 0701  03/04 0700    P  O   0     I V  (mL/kg) 1000 (17 2) 770 (13 3)     Total Intake(mL/kg) 1000 (17 2) 770 (13 3)     Urine (mL/kg/hr)  760 (0 5)     Stool  0     Total Output  760     Net +1000 +10            Unmeasured Urine Occurrence  1 x     Unmeasured Stool Occurrence  0 x          Nutrition: Diet Regular; Regular House  GI Proph/Bowel Reg:  Colace, senna  VTE Prophylaxis:Heparin -currently held for epidural catheter, will resume after placed    Physical Exam:   GENERAL APPEARANCE:  No acute distress  NEURO:  GCS 15, nonfocal exam  HEENT:  Normocephalic, atraumatic  CV:  Regular rate and rhythm, no murmurs gallops or rubs  LUNGS:  Clear to auscultation bilaterally, decreased breath sounds in the right base; +pulling 750 mL on IS; +saturating 96% on room air; +obvious splinting on exam  GI:  Soft, +right upper quadrant tenderness to palpation, nondistended, no rebound or guarding  No peritonitis  :  Voiding  MSK:  +right shoulder tenderness to palpation, no obvious contusions or deformities  No other contusions or deformities elsewhere  ; +right-sided chest wall tenderness to palpation laterally and posteriorly    Neurovascularly intact distally throughout all 4 extremities  SKIN:  Pink, warm, dry    Invasive Devices  Report    Peripheral Intravenous Line            Peripheral IV 03/01/22 Left Antecubital 1 day                 [facility] Score  PIC Pain Score: 2 (3/3/2022  7:30 AM)  PIC Incentive Spirometry Score: 3 (3/3/2022  7:30 AM)  PIC Cough Description: 2 (3/3/2022  7:30 AM)  PIC Total Score: 7 (3/3/2022  7:30 AM)       If the Total PIC Score </=5, did you consult APS and evaluate patient for further intervention?:  Spoke with APS hand with patient  Patient in agreement with epidural catheter  APS planning to perform epidural catheter later today to optimize pain control  Pain:    Incentive Spirometry  Cough  3 = Controlled  4 = Above goal volume 3 = Strong  2 = Moderate  3 = Goal to alert volume 2 = Weak  1 = Severe  2 = Below alert volume 1 = Absent     1 = Unable to perform IS         Lab Results:   Results: I have personally reviewed all pertinent laboratory/tests results, BMP/CMP:   Lab Results   Component Value Date    SODIUM 139 03/03/2022    K 3 8 03/03/2022     03/03/2022    CO2 26 03/03/2022    BUN 11 03/03/2022    CREATININE 0 93 03/03/2022    CALCIUM 9 8 03/03/2022    AST 20 03/03/2022    ALT 15 03/03/2022    ALKPHOS 60 03/03/2022    EGFR 63 03/03/2022    and CBC:   Lab Results   Component Value Date    WBC 4 27 (L) 03/03/2022    HGB 10 2 (L) 03/03/2022    HCT 29 7 (L) 03/03/2022    MCV 88 03/03/2022     03/03/2022    MCH 30 2 03/03/2022    MCHC 34 3 03/03/2022    RDW 13 3 03/03/2022    MPV 10 9 03/03/2022    NRBC 0 03/03/2022     Imaging/EKG Studies: I have personally reviewed pertinent reports  Chest x-ray 3/2 10:00 a m :  Small apical right-sided pneumothorax and small pleural effusion  Chest x-ray 3/2 6:00 p m :  Small stable apical right-sided pneumothorax and stable effusion  Right shoulder x-ray 3/2:  Negative for acute injury/fracture  Small apical pneumothorax appreciated    Other Studies:  No new

## 2022-03-03 NOTE — ED PROVIDER NOTES
History  Chief Complaint   Patient presents with    Fall     Mechanical trip and fall while wearing slippers  Hany Cota on to right side  Reports entire right sided body pain, worse on right ribcage/right back/right flank  +head strike  (-) thinners  (-) LOC  GCS 15  Alex Vásquez is a 79 yo F presenting for evaluation after fall occurring prior to arrival  She reports her shoes slipped out from under her, causing her to fall into R side on concrete step  Reports severe pain to R side of chest wall which worsens with deep breathing and movement  She also admits to striking the back of her head  Denies LOC, but does report generalized headache  She additionally reports R lower back pain, which is worse from baseline low back pain  Denies extremity pain,  numbness, or weakness  No blood thinners/aspirin use  T      History provided by:  Patient   used: No    Fall  Mechanism of injury: fall    Injury location:  Torso and head/neck  Head/neck injury location:  Head  Torso injury location:  R chest and back  Time since incident:  1 hour  Fall:     Height of fall:  Standing  Prior to arrival data:     Loss of consciousness: no      Amnesic to event: no    Associated symptoms: chest pain and headaches    Associated symptoms: no abdominal pain, no back pain, no nausea, no neck pain and no vomiting        Prior to Admission Medications   Prescriptions Last Dose Informant Patient Reported? Taking? LORazepam (ATIVAN) 1 mg tablet   Yes No   Sig: Take 1 mg by mouth daily at bedtime   LamoTRIgine (LAMICTAL PO)   Yes No   Sig: Take 1 tablet by mouth daily at bedtime   buPROPion (WELLBUTRIN) 100 mg tablet   Yes No   Sig: Take 100 mg by mouth 2 (two) times a day   budesonide-formoterol (SYMBICORT) 80-4 5 MCG/ACT inhaler   No No   Sig: Inhale 2 puffs 2 (two) times a day Rinse mouth after use     carvedilol (COREG) 25 mg tablet   Yes No   Sig: Take 25 mg by mouth 2 (two) times a day with meals   gabapentin (NEURONTIN) 300 mg capsule   No No   Sig: Take 1 capsule (300 mg total) by mouth 2 (two) times a day   hydroxychloroquine (PLAQUENIL) 200 mg tablet   No No   Sig: Take 1 tablet (200 mg total) by mouth 2 (two) times a day   morphine (MS CONTIN) 15 mg 12 hr tablet   Yes No   Sig: Take 15 mg by mouth 2 (two) times a day   neomycin-bacitracin-polymyxin (NEOSPORIN) 5-400-5,000 ointment   No No   Sig: Apply topically 3 (three) times a day   pantoprazole (PROTONIX) 40 mg tablet   No No   Sig: Take 1 tablet (40 mg total) by mouth daily in the early morning   polyethylene glycol (MIRALAX) 17 g packet   No No   Sig: Take 17 g by mouth daily as needed (constipation)      Facility-Administered Medications: None       Past Medical History:   Diagnosis Date    Anxiety     Asthma     Chronic pain     Depression     Enterovirus heart infection        Past Surgical History:   Procedure Laterality Date    FIXATION KYPHOPLASTY LUMBAR SPINE      HYSTERECTOMY      REPAIR RECTOCELE         History reviewed  No pertinent family history  I have reviewed and agree with the history as documented  E-Cigarette/Vaping    E-Cigarette Use Never User      E-Cigarette/Vaping Substances    Nicotine No     THC No     CBD No     Flavoring No     Other No     Unknown No      Social History     Tobacco Use    Smoking status: Never Smoker    Smokeless tobacco: Never Used   Vaping Use    Vaping Use: Never used   Substance Use Topics    Alcohol use: Yes     Comment: social    Drug use: Yes     Types: Marijuana     Comment: has medical card       Review of Systems   Constitutional: Negative for chills and fever  HENT: Negative for congestion, rhinorrhea and sore throat  Eyes: Negative for pain and visual disturbance  Respiratory: Positive for shortness of breath (with breathing)  Negative for cough and wheezing  Cardiovascular: Positive for chest pain  Negative for palpitations and leg swelling     Gastrointestinal: Negative for abdominal pain, diarrhea, nausea and vomiting  Genitourinary: Negative for dysuria, frequency and urgency  Musculoskeletal: Negative for back pain, neck pain and neck stiffness  Skin: Negative for rash and wound  Neurological: Positive for headaches  Negative for dizziness, syncope, speech difficulty, weakness, light-headedness and numbness  Physical Exam  Physical Exam  Constitutional:       General: She is in acute distress (due to pain)  Appearance: She is well-developed  She is not diaphoretic  HENT:      Head: Normocephalic and atraumatic  Right Ear: External ear normal       Left Ear: External ear normal    Eyes:      Conjunctiva/sclera: Conjunctivae normal       Pupils: Pupils are equal, round, and reactive to light  Cardiovascular:      Rate and Rhythm: Normal rate and regular rhythm  Heart sounds: Normal heart sounds  No murmur heard  No friction rub  No gallop  Pulmonary:      Effort: Pulmonary effort is normal  No respiratory distress  Breath sounds: Normal breath sounds  No wheezing, rhonchi or rales  Comments: R lateral chest wall tenderness with slight appreciable chest wall deformity  No flail segment  No midline C/T TTP  Midline and R paraspinal lumbar TTP  No pelvic bony TTP/instability  No bony TTP, deformity, or decreased ROM to b/l UE and LE  Chest:      Chest wall: Tenderness present  Abdominal:      General: There is no distension  Palpations: Abdomen is soft  Tenderness: There is no abdominal tenderness  Musculoskeletal:      Cervical back: Normal range of motion and neck supple  Lymphadenopathy:      Cervical: No cervical adenopathy  Skin:     General: Skin is warm and dry  Capillary Refill: Capillary refill takes less than 2 seconds  Findings: No erythema or rash  Neurological:      Mental Status: She is alert and oriented to person, place, and time  GCS: GCS eye subscore is 4  GCS verbal subscore is 5  GCS motor subscore is 6  Cranial Nerves: Cranial nerves are intact  Sensory: Sensation is intact  Motor: Motor function is intact  No abnormal muscle tone  Coordination: Coordination normal    Psychiatric:         Behavior: Behavior normal          Thought Content:  Thought content normal          Judgment: Judgment normal          Vital Signs  ED Triage Vitals [03/01/22 1742]   Temperature Pulse Respirations Blood Pressure SpO2   97 6 °F (36 4 °C) 80 18 150/60 100 %      Temp Source Heart Rate Source Patient Position - Orthostatic VS BP Location FiO2 (%)   Oral Monitor Lying Right arm --      Pain Score       10 - Worst Possible Pain           Vitals:    03/01/22 1742 03/01/22 2243   BP: 150/60 139/69   Pulse: 80 93   Patient Position - Orthostatic VS: Lying Lying         Visual Acuity      ED Medications  Medications   fentanyl citrate (PF) 100 MCG/2ML 25 mcg (25 mcg Intravenous Given 3/1/22 1931)   sodium chloride 0 9 % bolus 500 mL (0 mL Intravenous Stopped 3/1/22 2103)   iohexol (OMNIPAQUE) 350 MG/ML injection (SINGLE-DOSE) 100 mL (100 mL Intravenous Given 3/1/22 2050)   HYDROmorphone (DILAUDID) injection 1 mg (1 mg Intravenous Given 3/1/22 2103)   HYDROmorphone (DILAUDID) injection 0 5 mg (0 5 mg Intravenous Given 3/1/22 2249)       Diagnostic Studies  Results Reviewed     Procedure Component Value Units Date/Time    Basic metabolic panel [[id number]] Collected: 03/01/22 1933    Lab Status: Final result Specimen: Blood from Arm, Left Updated: 03/01/22 2009     Sodium 138 mmol/L      Potassium 4 2 mmol/L      Chloride 103 mmol/L      CO2 26 mmol/L      ANION GAP 9 mmol/L      BUN 11 mg/dL      Creatinine 1 12 mg/dL      Glucose 77 mg/dL      Calcium 10 1 mg/dL      eGFR 50 ml/min/1 73sq m     Narrative:      [place] guidelines for Chronic Kidney Disease (CKD):     Stage 1 with normal or high GFR (GFR > 90 mL/min/1 73 square meters)    Stage 2 Mild CKD (GFR = 60-89 mL/min/1 73 square meters)    Stage 3A Moderate CKD (GFR = 45-59 mL/min/1 73 square meters)    Stage 3B Moderate CKD (GFR = 30-44 mL/min/1 73 square meters)    Stage 4 Severe CKD (GFR = 15-29 mL/min/1 73 square meters)    Stage 5 End Stage CKD (GFR <15 mL/min/1 73 square meters)  Note: GFR calculation is accurate only with a steady state creatinine    APTT [[id number]]  (Normal) Collected: 03/01/22 1933    Lab Status: Final result Specimen: Blood from Arm, Left Updated: 03/01/22 2001     PTT 28 seconds     Protime-INR [[id number]]  (Normal) Collected: 03/01/22 1933    Lab Status: Final result Specimen: Blood from Arm, Left Updated: 03/01/22 2001     Protime 13 2 seconds      INR 1 03    CBC and differential [[id number]] Collected: 03/01/22 1933    Lab Status: Final result Specimen: Blood from Arm, Left Updated: 03/01/22 1942     WBC 5 83 Thousand/uL      RBC 4 10 Million/uL      Hemoglobin 12 2 g/dL      Hematocrit 37 0 %      MCV 90 fL      MCH 29 8 pg      MCHC 33 0 g/dL      RDW 12 8 %      MPV 9 9 fL      Platelets 851 Thousands/uL      nRBC 0 /100 WBCs      Neutrophils Relative 74 %      Immat GRANS % 0 %      Lymphocytes Relative 18 %      Monocytes Relative 8 %      Eosinophils Relative 0 %      Basophils Relative 0 %      Neutrophils Absolute 4 32 Thousands/µL      Immature Grans Absolute 0 01 Thousand/uL      Lymphocytes Absolute 1 03 Thousands/µL      Monocytes Absolute 0 44 Thousand/µL      Eosinophils Absolute 0 01 Thousand/µL      Basophils Absolute 0 02 Thousands/µL                  CT head without contrast   Final Result by Silas Porter MD (03/01 2147)      No acute intracranial abnormality  Workstation performed: [id number]         CT spine cervical without contrast   Final Result by Silas Porter MD (03/01 2159)      No cervical spine fracture or traumatic malalignment  Right apical pneumothorax               Workstation performed: [id number]         CT chest abdomen pelvis w contrast   Final Result by Roberta Yan MD (03/01 2207)      Acute displaced right 10th-12th rib fractures with sharp rib ends extending into the pleural space on the right posteriorly  Right hydropneumothorax which may not reexpand with chest tube given the sharp bony fragments  Nondisplaced right L2 and L3 transverse process fractures  Hypodensity in the liver adjacent to one of the rib fractures could represent small contusion or cyst           I personally discussed this study with Matthew Ma on 3/1/2022 at 9:44 PM   Trauma team will be consulted  Workstation performed: [id number]                    Procedures  Procedures         ED Course  ED Course as of 03/03/22 1338   Tue Mar 01, 2022   2054 Reports back pain is still a 10/10  Requests stronger narcotic pain control  Writhing around bed  Will provide 1 mg dilaudid  2200 Case discussed with Dr Jodie Fabian, on call trauma at [facility] who accepts patient in transfer  Pt remains stable with O2 99% on room air  Pain well controlled with dilaudid  Patient agreeable to transfer via ambulance  MDM  Number of Diagnoses or Management Options  Fall, initial encounter  Pneumothorax  Rib fracture  Diagnosis management comments: R sided rib/chest wall pain, generalized headache, and R low back pain after apparently mechanical fall shortly prior to arrival  TTP to R lateral chest wall with slight deformity noted, no flail chest  Lungs CTAB at time of my exam  Vitals stable  Will check CT head, c-spine, check CT chest/abd/pelvis for acute traumatic pathology, check basic labs, coags  Will provide pain control, gentle IV fluids         Disposition  Final diagnoses:   Fall, initial encounter   Rib fracture   Pneumothorax     Time reflects when diagnosis was documented in both MDM as applicable and the Disposition within this note     Time User Action Codes Description Comment    3/1/2022 10:06 PM Rosales Stuart Add [M21  ZOIB] Fall, initial encounter     3/1/2022 10:06 PM Rosales Stuart Add [P56 78TE] Rib fracture     3/1/2022 10:06 PM Rosales Stuart Add [J93 9] Pneumothorax       ED Disposition     ED Disposition Condition Date/Time Comment    Transfer to Another Facility-In Network  Tue Mar 1, 2022 10:06 PM Shanti Arellano should be transferred out to [facility]          MD Documentation      Most Recent Value   Patient Condition The patient has been stabilized such that within reasonable medical probability, no material deterioration of the patient condition or the condition of the unborn child(michael) is likely to result from the transfer   Reason for Transfer Level of Care needed not available at this facility   Benefits of Transfer Specialized equipment and/or services available at the receiving facility (Include comment)________________________, Continuity of care   Risks of Transfer Potential for delay in receiving treatment, Potential deterioration of medical condition, Increased discomfort during transfer   Accepting Physician Dr Ty Albert Name, [address]  [facility]    (Name & Tel number) PACS   Transported by (Company and Unit #) [employer]   Sending MD Dr Marizol Barnes   Provider Certification General risk, such as traffic hazards, adverse weather conditions, rough terrain or turbulence, possible failure of equipment (including vehicle or aircraft), or consequences of actions of persons outside the control of the transport personnel, Unanticipated needs of medical equipment and personnel during transport, Risk of worsening condition, The possibility of a transport vehicle being unavailable      RN Documentation      [facility] Name, [address]  [facility]    (Name & Tel number) PACS   Transported by (Company and Unit #) [employer]      Follow-up Information    None         Discharge Medication List as of 3/1/2022 11:12 PM      CONTINUE these medications which have NOT CHANGED    Details   budesonide-formoterol (SYMBICORT) 80-4 5 MCG/ACT inhaler Inhale 2 puffs 2 (two) times a day Rinse mouth after use , Starting Mon 6/28/2021, Normal      buPROPion (WELLBUTRIN) 100 mg tablet Take 100 mg by mouth 2 (two) times a day, Historical Med      carvedilol (COREG) 25 mg tablet Take 25 mg by mouth 2 (two) times a day with meals, Historical Med      gabapentin (NEURONTIN) 300 mg capsule Take 1 capsule (300 mg total) by mouth 2 (two) times a day, Starting Mon 6/28/2021, Normal      hydroxychloroquine (PLAQUENIL) 200 mg tablet Take 1 tablet (200 mg total) by mouth 2 (two) times a day, Starting Mon 1/10/2022, Until Sat 7/9/2022, Normal      LamoTRIgine (LAMICTAL PO) Take 1 tablet by mouth daily at bedtime, Historical Med      LORazepam (ATIVAN) 1 mg tablet Take 1 mg by mouth daily at bedtime, Historical Med      morphine (MS CONTIN) 15 mg 12 hr tablet Take 15 mg by mouth 2 (two) times a day, Historical Med      neomycin-bacitracin-polymyxin (NEOSPORIN) 5-400-5,000 ointment Apply topically 3 (three) times a day, Starting Wed 7/21/2021, Print      pantoprazole (PROTONIX) 40 mg tablet Take 1 tablet (40 mg total) by mouth daily in the early morning, Starting Tue 6/29/2021, Normal      polyethylene glycol (MIRALAX) 17 g packet Take 17 g by mouth daily as needed (constipation), Starting Mon 6/28/2021, Normal             No discharge procedures on file      PDMP Review     None          ED Provider  Electronically Signed by           Norma Nuñez PA-C  03/03/22 4113

## 2022-03-03 NOTE — ASSESSMENT & PLAN NOTE
- pain control with multimodal analgesic regimen  - quick draw brace deferred due to right-sided 10th through 12th rib fractures which will likely interfere with bracing  - no intervention or neurosurgical evaluation necessary

## 2022-03-03 NOTE — ASSESSMENT & PLAN NOTE
- seen on admission CT chest  - f/u CXR on 3/2 shows small apical pneumothorax and effusion    Repeat chest x-ray approximately 8 hours later reveals stable small apical pneumothorax and effusion   -saturating in the high 90s on room air, no supplemental oxygen required  - encourage pulmonary toilette/IS

## 2022-03-04 PROBLEM — F31.9 BIPOLAR DEPRESSION (HCC): Status: ACTIVE | Noted: 2022-03-04

## 2022-03-04 PROBLEM — M06.9 RHEUMATOID ARTHRITIS (HCC): Status: ACTIVE | Noted: 2022-03-04

## 2022-03-04 LAB
ANION GAP SERPL CALCULATED.3IONS-SCNC: 3 MMOL/L (ref 4–13)
BASOPHILS # BLD AUTO: 0.01 THOUSANDS/ΜL (ref 0–0.1)
BASOPHILS NFR BLD AUTO: 0 % (ref 0–1)
BUN SERPL-MCNC: 8 MG/DL (ref 5–25)
CALCIUM SERPL-MCNC: 9.7 MG/DL (ref 8.3–10.1)
CHLORIDE SERPL-SCNC: 111 MMOL/L (ref 100–108)
CO2 SERPL-SCNC: 24 MMOL/L (ref 21–32)
CREAT SERPL-MCNC: 0.84 MG/DL (ref 0.6–1.3)
EOSINOPHIL # BLD AUTO: 0 THOUSAND/ΜL (ref 0–0.61)
EOSINOPHIL NFR BLD AUTO: 0 % (ref 0–6)
ERYTHROCYTE [DISTWIDTH] IN BLOOD BY AUTOMATED COUNT: 13.5 % (ref 11.6–15.1)
GFR SERPL CREATININE-BSD FRML MDRD: 72 ML/MIN/1.73SQ M
GLUCOSE SERPL-MCNC: 75 MG/DL (ref 65–140)
HCT VFR BLD AUTO: 34.8 % (ref 34.8–46.1)
HGB BLD-MCNC: 11.4 G/DL (ref 11.5–15.4)
IMM GRANULOCYTES # BLD AUTO: 0.01 THOUSAND/UL (ref 0–0.2)
IMM GRANULOCYTES NFR BLD AUTO: 0 % (ref 0–2)
LYMPHOCYTES # BLD AUTO: 1.2 THOUSANDS/ΜL (ref 0.6–4.47)
LYMPHOCYTES NFR BLD AUTO: 24 % (ref 14–44)
MCH RBC QN AUTO: 30.1 PG (ref 26.8–34.3)
MCHC RBC AUTO-ENTMCNC: 32.8 G/DL (ref 31.4–37.4)
MCV RBC AUTO: 92 FL (ref 82–98)
MONOCYTES # BLD AUTO: 0.55 THOUSAND/ΜL (ref 0.17–1.22)
MONOCYTES NFR BLD AUTO: 11 % (ref 4–12)
NEUTROPHILS # BLD AUTO: 3.25 THOUSANDS/ΜL (ref 1.85–7.62)
NEUTS SEG NFR BLD AUTO: 65 % (ref 43–75)
NRBC BLD AUTO-RTO: 0 /100 WBCS
PLATELET # BLD AUTO: 185 THOUSANDS/UL (ref 149–390)
PMV BLD AUTO: 9.9 FL (ref 8.9–12.7)
POTASSIUM SERPL-SCNC: 4.2 MMOL/L (ref 3.5–5.3)
RBC # BLD AUTO: 3.79 MILLION/UL (ref 3.81–5.12)
SODIUM SERPL-SCNC: 138 MMOL/L (ref 136–145)
WBC # BLD AUTO: 5.02 THOUSAND/UL (ref 4.31–10.16)

## 2022-03-04 PROCEDURE — 85025 COMPLETE CBC W/AUTO DIFF WBC: CPT | Performed by: PHYSICIAN ASSISTANT

## 2022-03-04 PROCEDURE — 97116 GAIT TRAINING THERAPY: CPT

## 2022-03-04 PROCEDURE — 99232 SBSQ HOSP IP/OBS MODERATE 35: CPT | Performed by: SURGERY

## 2022-03-04 PROCEDURE — 80048 BASIC METABOLIC PNL TOTAL CA: CPT | Performed by: PHYSICIAN ASSISTANT

## 2022-03-04 PROCEDURE — 99232 SBSQ HOSP IP/OBS MODERATE 35: CPT | Performed by: ANESTHESIOLOGY

## 2022-03-04 PROCEDURE — 97530 THERAPEUTIC ACTIVITIES: CPT

## 2022-03-04 RX ORDER — ALBUTEROL SULFATE 90 UG/1
2 AEROSOL, METERED RESPIRATORY (INHALATION) EVERY 6 HOURS PRN
COMMUNITY

## 2022-03-04 RX ORDER — ALBUTEROL SULFATE 90 UG/1
2 AEROSOL, METERED RESPIRATORY (INHALATION) EVERY 6 HOURS PRN
Status: DISCONTINUED | OUTPATIENT
Start: 2022-03-04 | End: 2022-03-08 | Stop reason: HOSPADM

## 2022-03-04 RX ORDER — PANTOPRAZOLE SODIUM 40 MG/1
40 TABLET, DELAYED RELEASE ORAL
Status: DISCONTINUED | OUTPATIENT
Start: 2022-03-04 | End: 2022-03-08 | Stop reason: HOSPADM

## 2022-03-04 RX ORDER — CARVEDILOL 25 MG/1
25 TABLET ORAL 2 TIMES DAILY WITH MEALS
Status: DISCONTINUED | OUTPATIENT
Start: 2022-03-04 | End: 2022-03-08 | Stop reason: HOSPADM

## 2022-03-04 RX ORDER — HYDROXYCHLOROQUINE SULFATE 200 MG/1
200 TABLET, FILM COATED ORAL 2 TIMES DAILY
Status: DISCONTINUED | OUTPATIENT
Start: 2022-03-04 | End: 2022-03-08 | Stop reason: HOSPADM

## 2022-03-04 RX ADMIN — LORAZEPAM 1 MG: 1 TABLET ORAL at 21:56

## 2022-03-04 RX ADMIN — LIDOCAINE 5% 1 PATCH: 700 PATCH TOPICAL at 08:35

## 2022-03-04 RX ADMIN — HEPARIN SODIUM 5000 UNITS: 5000 INJECTION INTRAVENOUS; SUBCUTANEOUS at 21:57

## 2022-03-04 RX ADMIN — ROPIVACAINE HYDROCHLORIDE: 2 INJECTION, SOLUTION EPIDURAL; INFILTRATION at 12:39

## 2022-03-04 RX ADMIN — OXYCODONE HYDROCHLORIDE 10 MG: 10 TABLET ORAL at 21:57

## 2022-03-04 RX ADMIN — HEPARIN SODIUM 5000 UNITS: 5000 INJECTION INTRAVENOUS; SUBCUTANEOUS at 13:43

## 2022-03-04 RX ADMIN — METHOCARBAMOL 500 MG: 500 TABLET ORAL at 12:17

## 2022-03-04 RX ADMIN — METHOCARBAMOL 500 MG: 500 TABLET ORAL at 05:59

## 2022-03-04 RX ADMIN — METHOCARBAMOL 500 MG: 500 TABLET ORAL at 00:25

## 2022-03-04 RX ADMIN — ACETAMINOPHEN 975 MG: 325 TABLET ORAL at 13:43

## 2022-03-04 RX ADMIN — METHOCARBAMOL 500 MG: 500 TABLET ORAL at 17:21

## 2022-03-04 RX ADMIN — GABAPENTIN 200 MG: 100 CAPSULE ORAL at 17:21

## 2022-03-04 RX ADMIN — OXYCODONE HYDROCHLORIDE 10 MG: 10 TABLET ORAL at 13:43

## 2022-03-04 RX ADMIN — PANTOPRAZOLE SODIUM 40 MG: 40 TABLET, DELAYED RELEASE ORAL at 12:33

## 2022-03-04 RX ADMIN — LAMOTRIGINE 150 MG: 100 TABLET ORAL at 21:56

## 2022-03-04 RX ADMIN — ROPIVACAINE HYDROCHLORIDE: 2 INJECTION, SOLUTION EPIDURAL; INFILTRATION at 20:46

## 2022-03-04 RX ADMIN — CARVEDILOL 25 MG: 25 TABLET, FILM COATED ORAL at 17:20

## 2022-03-04 RX ADMIN — ACETAMINOPHEN 975 MG: 325 TABLET ORAL at 05:59

## 2022-03-04 RX ADMIN — MORPHINE SULFATE 15 MG: 15 TABLET, FILM COATED, EXTENDED RELEASE ORAL at 05:59

## 2022-03-04 RX ADMIN — OXYCODONE HYDROCHLORIDE 10 MG: 10 TABLET ORAL at 01:10

## 2022-03-04 RX ADMIN — GABAPENTIN 200 MG: 100 CAPSULE ORAL at 21:56

## 2022-03-04 RX ADMIN — ROPIVACAINE HYDROCHLORIDE: 2 INJECTION, SOLUTION EPIDURAL; INFILTRATION at 00:20

## 2022-03-04 RX ADMIN — DOCUSATE SODIUM 100 MG: 100 CAPSULE ORAL at 08:35

## 2022-03-04 RX ADMIN — HYDROXYCHLOROQUINE SULFATE 200 MG: 200 TABLET, FILM COATED ORAL at 17:20

## 2022-03-04 RX ADMIN — GABAPENTIN 200 MG: 100 CAPSULE ORAL at 08:35

## 2022-03-04 RX ADMIN — MORPHINE SULFATE 15 MG: 15 TABLET, FILM COATED, EXTENDED RELEASE ORAL at 17:20

## 2022-03-04 NOTE — ASSESSMENT & PLAN NOTE
-hemoglobin stable at 11 4    Abdomen is soft and nondistended with benign exam   -continue abdominal exam   - continue to monitor hemoglobin  -likely secondary to localized rib fractures with displacement adjacent to the liver

## 2022-03-04 NOTE — PROGRESS NOTES
[facility]  Progress Note - Cecile Hadley 1955, 78 y o  female MRN: [id number]  Unit/Bed#: [facility] 620-01 Encounter: [id number]  Primary Care Provider: Harpal Laird DO   Date and time admitted to hospital: 3/1/2022 11:32 PM    [address]  - mechanical fall  -sustained the below stated injuries  -PT OT evaluations recommending inpatient rehab  -case management for dispo planning    Closed fracture of transverse process of lumbar vertebra (HCC)  Assessment & Plan  - pain control with multimodal analgesic regimen  - quick draw brace deferred due to right-sided 10th through 12th rib fractures which will likely interfere with bracing  - no intervention or neurosurgical evaluation necessary    Traumatic pneumothorax  Assessment & Plan  - seen on admission CT chest  - f/u CXR on 3/2 shows small apical pneumothorax and effusion  Repeat chest x-ray approximately 8 hours later reveals stable small apical pneumothorax and effusion   -saturating in the high 90s on room air, no supplemental oxygen required  - encourage pulmonary toilette/IS    * Closed fracture of multiple ribs of right side  Assessment & Plan  - Multiple right-sided rib fractures (10-12th), present on admission   - Continue rib fracture protocol   - Continue to encourage incentive spirometer use and adequate pulmonary hygiene  Currently pulling 1-1 2L on I S   - Continue multimodal analgesic regimen  Appreciate APS evaluation and recommendations  Continue epidural catheter  - Supplemental oxygen via nasal cannula as needed to maintain saturations greater than or equal to 94%  Currently on room air    - Repeat chest x-ray from 3/2 reviewed showing small apical pneumothorax and small effusion  Repeat chest x-ray in the evening of 3/2 reveals small stable right apical pneumothorax and effusion    - PT and OT evaluation and treatment as indicated    - Outpatient follow-up in the trauma clinic for re-evaluation in approximately 2 weeks  Acute pain due to trauma  Assessment & Plan  - Acute pain secondary to traumatic injuries  - 3/3 EDC placed by APS  Will continue multimodal analgesic regimen at this time and resume home MSIR 15 mg q 12 in addition to p r n  Oxycodone and scheduled Tylenol, methocarbamol, gabapentin and Lidoderm patches  - Bowel regimen as long as using opioids   - Continue to monitor pain and adjust regimen as indicated  Rheumatoid arthritis ([facility] [address] )  Assessment & Plan  · Continue home Plaquenil    Bipolar depression ([facility] [address] )  Assessment & Plan  · Continue home Wellbutrin and Lamictal    Severe protein-calorie malnutrition ([facility] [address] )  Assessment & Plan  Malnutrition Findings:   Adult Malnutrition type: Acute illness  Adult Degree of Malnutrition: Other severe protein calorie malnutrition (as evidence by moderate clavicle muscle wasting, moderate temple muscle wasting, mild dorsal hand muscle wasting, severe buccal fat loss  Treated with oral diet and nutrition supplementation)    BMI Findings: Body mass index is 21 97 kg/m²  Diet with supplements  Nutrition consult    Right shoulder pain  Assessment & Plan  -x-ray completed, no fracture  -pain control multimodal analgesic regimen  -may be referred pain from right-sided rib fractures as well  -PT OT     Liver contusion  Assessment & Plan  -hemoglobin stable at 11 4  Abdomen is soft and nondistended with benign exam   -continue abdominal exam   - continue to monitor hemoglobin  -likely secondary to localized rib fractures with displacement adjacent to the liver        Disposition:  Pending pain control    SUBJECTIVE:  Chief Complaint:  "My pain is better"    Subjective:  Patient states that her pain has improved overnight  She notes continuing to use her incentive spirometry and inspiring 1000 mL  She does have some pain with deep breathing and coughing    She denies any abdominal pain, nausea, vomiting, oral intake intolerance, diarrhea, hematochezia  Patient denies having any other complaints  OBJECTIVE:   Vitals:   Temp:  [97 6 °F (36 4 °C)-98 4 °F (36 9 °C)] 97 8 °F (36 6 °C)  HR:  [] 102  Resp:  [16-21] 18  BP: (121-155)/(74-91) 132/81    Intake/Output:  I/O       03/02 0701  03/03 0700 03/03 0701  03/04 0700 03/04 0701  03/05 0700    P  O  0 440 600    I V  (mL/kg) 770 (13 3) 171 5 (3)     Total Intake(mL/kg) 770 (13 3) 611 5 (10 5) 600 (10 3)    Urine (mL/kg/hr) 760 (0 5) 800 (0 6) 1000 (2 3)    Stool 0 0 0    Total Output     Net +10 -188 5 -400           Unmeasured Urine Occurrence 1 x 1 x 2 x    Unmeasured Stool Occurrence 0 x 0 x 1 x         Nutrition: Diet Regular; Regular House  GI Proph/Bowel Reg:  Senna, Colace  VTE Prophylaxis:Sequential compression device (Venodyne)  and Heparin     Physical Exam:   GENERAL APPEARANCE:  No acute distress  NEURO:  GCS 15  Light touch sensation intact throughout  HEENT:  Normocephalic, atraumatic  Neck supple  CV: RRR, +2 radial dorsalis pedis pulses, bilaterally  LUNGS:  Clear to auscultation, bilaterally  No wheezing, no rhonchi, no rales  Right lower ribs tender on palpation  GI:  Abdomen is soft nontender  :  Pelvis stable  MSK:  Moving all extremities  No deformities  SKIN:  Warm, dry, well perfused      Invasive Devices  Report    Peripheral Intravenous Line            Peripheral IV 03/01/22 Left Antecubital 2 days          Epidural Line            Epidural Catheter 03/03/22 <1 day          Drain            External Urinary Catheter 1 day                 PIC Score  PIC Pain Score: 1 (3/4/2022  1:43 PM)  PIC Incentive Spirometry Score: 2 (3/4/2022 12:15 PM)  PIC Cough Description: 3 (3/4/2022 12:15 PM)  PIC Total Score: 7 (3/4/2022 12:15 PM)       If the Total PIC Score </=5, did you consult APS and evaluate patient for further intervention?: yes      Pain:    Incentive Spirometry  Cough  3 = Controlled  4 = Above goal volume 3 = Strong  2 = Moderate  3 = Goal to alert volume 2 = Weak  1 = Severe  2 = Below alert volume 1 = Absent     1 = Unable to perform IS         Lab Results:   Results: I have personally reviewed all pertinent laboratory/tests results, BMP/CMP:   Lab Results   Component Value Date    SODIUM 138 03/04/2022    K 4 2 03/04/2022     (H) 03/04/2022    CO2 24 03/04/2022    BUN 8 03/04/2022    CREATININE 0 84 03/04/2022    CALCIUM 9 7 03/04/2022    EGFR 72 03/04/2022    and CBC:   Lab Results   Component Value Date    WBC 5 02 03/04/2022    HGB 11 4 (L) 03/04/2022    HCT 34 8 03/04/2022    MCV 92 03/04/2022     03/04/2022    MCH 30 1 03/04/2022    MCHC 32 8 03/04/2022    RDW 13 5 03/04/2022    MPV 9 9 03/04/2022    NRBC 0 03/04/2022     Imaging/EKG Studies: I have personally reviewed pertinent reports       Other Studies: none

## 2022-03-04 NOTE — PLAN OF CARE
Problem: PHYSICAL THERAPY ADULT  Goal: Performs mobility at highest level of function for planned discharge setting  See evaluation for individualized goals  Description: Treatment/Interventions: Functional transfer training,LE strengthening/ROM,Elevations,Therapeutic exercise,Endurance training,Patient/family training,Equipment eval/education,Bed mobility,Gait training,Spoke to nursing,OT  Equipment Recommended:  (continue to assess )       See flowsheet documentation for full assessment, interventions and recommendations  Outcome: Progressing  Note: Prognosis: Good  Problem List: Decreased strength,Decreased endurance,Impaired balance,Decreased mobility,Pain,Decreased safety awareness  Assessment: Patient was moderate assist x1 for supine to sit transfer  Patient able to sit EOB extended time, use of bed/rails for UE support required  Patient was moderate assist x1 with use of grab bar and RW for toilet transfer  Patient able to ambulate increased distances this session with use of RW, moderate assist initially changing to minimal assist x1, assist of second for lines/chair follow  Patient fatigues quickly, would recommend a chair follow for further distances  Patient required assistance for bed mobilty/repositioning  Patient limited this session by pain and fatigue and would continue to benefit from physical therapy to improve functional mobilty until medically cleared  Barriers to Discharge: Decreased caregiver support        PT Discharge Recommendation: Post acute rehabilitation services          See flowsheet documentation for full assessment

## 2022-03-04 NOTE — PROGRESS NOTES
Epidural Follow-up Note - Acute Pain Service    Chong Gifford 78 y o  female MRN: [id number]  Unit/Bed#: [facility] 620-01 Encounter: [id number]      Assessment:   Principal Problem:    Closed fracture of multiple ribs of right side  Active Problems:    Traumatic pneumothorax    Closed fracture of transverse process of lumbar vertebra [facility])    Liver contusion    Fall    Acute pain due to trauma    Right shoulder pain    Severe protein-calorie malnutrition ([facility] [address] )      Chong Gifford is a 78y o  year old female with severe acute chest wall pain from displaced right 10-12 rib fractures after a fall  An epidural was placed yesterday for pain management of her right sided rib fractures  Overnight, there was concern of a hematoma collection around epidural site  The area was redressed and hep placed on hold  Upon seeing the patient: she is OOB to chair, sitting up, awake, alert communicative  Inspection of backside shows heme tinged fluid around epidural statlock area  No hematoma noted  The area was cleaned up, dabbed dry, and redressed with tegaderm and two inch paper tape  There is no bruising, erythema around epidural site  The alligator clip was also secured on left shoulder site  Pt admits she is doing well overall in terms of pain  Her pain control is now better managed with epidural in place  She is agreeable to have epidural settings changed to provide broader coverage of pain control around her rib area  Her IS is around 750-1000 cc at this time  Plan:  - modify epidural to 8 cc/hr, 4 cc bolus, 10 min lockout, 4 doses max/hr  - can resume hep SQ order  Will continue to monitor epidural site and redress area if needed  - continue tylenol 975 mg PO TID (CMP yesterday shows normal LFTs)  - continue gabapentin 200 mg PO TID   May increase to 300 mg PO BID (home med dose)  - continue lidoderm patch order  - continue ativan 1 mg PO QHS  - continue robaxin 500 mg PO QID for muscle spasms  - continue dilaudid 0 5 mg IV Q 4 hrs PRN breakthrough pain  - continue oxycodone 5-10 mg Po Q 4 hrs PRN mod-severe pain  - continue MS ER 15 mg PO BID (home med)    Bowel Regimen: continue colace, senokot    APS will continue to follow  Please contact Acute Pain Service - [facility] via [employer] from [phone] with additional questions or concerns  See TigerText or Riley for additional contacts and after hours information  Pain History  Current pain location(s): right sided rib fractures  Pain Scale:   6-10/10  24 hour history: epidural placed yesterday  Pt admits to better pain control  Addressed hematoma collection around epidural site       Opioid requirement previous 24 hours: oxycodone 10 mg PO x 2, MS ER (scheduled), no IV breakthrough pain meds utilized    Meds/Allergies   all current active meds have been reviewed, current meds:   Current Facility-Administered Medications   Medication Dose Route Frequency    acetaminophen (TYLENOL) tablet 975 mg  975 mg Oral Q8H [address]    albuterol inhalation solution 2 5 mg  2 5 mg Nebulization Q6H PRN    docusate sodium (COLACE) capsule 100 mg  100 mg Oral BID    gabapentin (NEURONTIN) capsule 200 mg  200 mg Oral TID    heparin (porcine) subcutaneous injection 5,000 Units  5,000 Units Subcutaneous Q8H [address]    HYDROmorphone (DILAUDID) injection 0 5 mg  0 5 mg Intravenous Q4H PRN    lidocaine (LIDODERM) 5 % patch 1 patch  1 patch Topical Daily    LORazepam (ATIVAN) tablet 1 mg  1 mg Oral HS    methocarbamol (ROBAXIN) tablet 500 mg  500 mg Oral Q6H KHARI    morphine (MS CONTIN) ER tablet 15 mg  15 mg Oral Q12H KHARI    ondansetron (ZOFRAN) injection 4 mg  4 mg Intravenous Q6H PRN    oxyCODONE (ROXICODONE) immediate release tablet 10 mg  10 mg Oral Q4H PRN    oxyCODONE (ROXICODONE) IR tablet 5 mg  5 mg Oral Q4H PRN    pantoprazole (PROTONIX) EC tablet 40 mg  40 mg Oral Early Morning    ropivacaine 0 2% PCEA   Epidural Continuous    senna (SENOKOT) tablet 17 2 mg  2 tablet Oral Daily  sodium chloride (OCEAN) 0 65 % nasal spray 1 spray  1 spray Each Nare Q2H PRN    and PTA meds:   Prior to Admission Medications   Prescriptions Last Dose Informant Patient Reported? Taking? LORazepam (ATIVAN) 1 mg tablet   Yes No   Sig: Take 1 mg by mouth daily at bedtime   LamoTRIgine (LAMICTAL PO)   Yes No   Sig: Take 1 tablet by mouth daily at bedtime   buPROPion (WELLBUTRIN) 100 mg tablet   Yes No   Sig: Take 100 mg by mouth 2 (two) times a day   budesonide-formoterol (SYMBICORT) 80-4 5 MCG/ACT inhaler   No No   Sig: Inhale 2 puffs 2 (two) times a day Rinse mouth after use  carvedilol (COREG) 25 mg tablet   Yes No   Sig: Take 25 mg by mouth 2 (two) times a day with meals   gabapentin (NEURONTIN) 300 mg capsule   No No   Sig: Take 1 capsule (300 mg total) by mouth 2 (two) times a day   hydroxychloroquine (PLAQUENIL) 200 mg tablet   No No   Sig: Take 1 tablet (200 mg total) by mouth 2 (two) times a day   morphine (MS CONTIN) 15 mg 12 hr tablet   Yes No   Sig: Take 15 mg by mouth 2 (two) times a day   neomycin-bacitracin-polymyxin (NEOSPORIN) 5-400-5,000 ointment   No No   Sig: Apply topically 3 (three) times a day   pantoprazole (PROTONIX) 40 mg tablet   No No   Sig: Take 1 tablet (40 mg total) by mouth daily in the early morning   polyethylene glycol (MIRALAX) 17 g packet   No No   Sig: Take 17 g by mouth daily as needed (constipation)      Facility-Administered Medications: None       Allergies   Allergen Reactions    Penicillins        Objective     Temp:  [97 6 °F (36 4 °C)-98 4 °F (36 9 °C)] 97 8 °F (36 6 °C)  HR:  [] 92  Resp:  [16-21] 18  BP: (121-155)/(74-91) 121/74    Physical Exam  Vitals reviewed  Constitutional:       Appearance: Normal appearance  HENT:      Head: Normocephalic and atraumatic  Nose: Nose normal       Mouth/Throat:      Mouth: Mucous membranes are moist    Eyes:      Extraocular Movements: Extraocular movements intact        Pupils: Pupils are equal, round, and reactive to light  Pulmonary:      Effort: Pulmonary effort is normal       Comments: Able to take in deeper breaths as per patient  Musculoskeletal:      Cervical back: Normal range of motion  Comments: Epidural site inspected, area redressed  Pt can move around and ambulate more after epidural placement  Skin:     General: Skin is warm  Neurological:      General: No focal deficit present  Mental Status: She is alert and oriented to person, place, and time  Mental status is at baseline  Psychiatric:         Mood and Affect: Mood normal          Behavior: Behavior normal          Thought Content: Thought content normal          Judgment: Judgment normal        Epidural: Site clean/dry/intact, no surrounding erythema/edema/induration, infusion functioning appropriately    Lab Results:   Results from last 7 days   Lab Units 03/04/22  0949   WBC Thousand/uL 5 02   HEMOGLOBIN g/dL 11 4*   HEMATOCRIT % 34 8   PLATELETS Thousands/uL 185      Results from last 7 days   Lab Units 03/04/22  0610 03/03/22  0545 03/03/22  0545   POTASSIUM mmol/L 4 2   < > 3 8   CHLORIDE mmol/L 111*   < > 108   CO2 mmol/L 24   < > 26   BUN mg/dL 8   < > 11   CREATININE mg/dL 0 84   < > 0 93   CALCIUM mg/dL 9 7   < > 9 8   ALK PHOS U/L  --   --  60   ALT U/L  --   --  15   AST U/L  --   --  20    < > = values in this interval not displayed  Results from last 7 days   Lab Units 03/02/22  0536   PTT seconds 29   INR  1 07       Imaging Studies: I have personally reviewed pertinent reports  EKG, Pathology, and Other Studies: I have personally reviewed pertinent reports  Counseling / Coordination of Care  Total floor / unit time spent today 20 minutes  Greater than 50% of total time was spent with the patient and / or family counseling and / or coordination of care  A description of the counseling / coordination of care: reassessed epidural site, reinforced current pain regimen       Please note that the APS provides consultative services regarding pain management only  With the exception of ketamine, peripheral nerve catheters, and epidural infusions (and except when indicated), final decisions regarding starting or changing doses of analgesic medications are at the discretion of the consulting service  Off hours consultation and/or medication management is generally not available      aMteusz Hunt MD  Acute Pain Service

## 2022-03-04 NOTE — PHYSICAL THERAPY NOTE
Physical Therapy Progress Note     03/04/22 1445   PT Last Visit   PT Visit Date 03/04/22   Pain Assessment   Pain Assessment Tool 0-10   Pain Score 5   Pain Location/Orientation Orientation: Right;Location: Rib Cage   Restrictions/Precautions   Weight Bearing Precautions Per Order No   Other Precautions Bed Alarm;Pain; Fall Risk;Cognitive   General   Chart Reviewed Yes   Response to Previous Treatment Patient with no complaints from previous session  Family/Caregiver Present No   Cognition   Overall Cognitive Status WFL   Arousal/Participation Alert; Responsive; Cooperative   Subjective   Subjective Patient in bed, reporting fatigue, agreeable to physical therapy, requesting to use bathroom at start of session  Bed Mobility   Supine to Sit 3  Moderate assistance   Additional items Assist x 1; Increased time required;Verbal cues;LE management   Sit to Supine 4  Minimal assistance   Additional items Assist x 1;Bedrails; Increased time required;Verbal cues;LE management   Transfers   Sit to Stand 3  Moderate assistance   Additional items Assist x 1; Increased time required;Verbal cues   Stand to Sit 3  Moderate assistance   Additional items Assist x 1; Increased time required;Verbal cues   Toilet transfer 3  Moderate assistance   Additional items Assist x 1; Increased time required;Verbal cues;Standard toilet   Ambulation/Elevation   Gait pattern Excessively slow; Step to; Foward flexed; Inconsistent anna;Decreased foot clearance   Gait Assistance 4  Minimal assist  (mod initially changing to min )   Additional items Assist x 1;Verbal cues  (assist of second for chair/lines)   Assistive Device Rolling walker   Distance 10ft, 40ftx2    Balance   Static Sitting Fair   Static Standing Poor +   Ambulatory Poor   Endurance Deficit   Endurance Deficit Yes   Endurance Deficit Description pain, fatigue   Activity Tolerance   Activity Tolerance Patient limited by pain; Patient limited by fatigue   Nurse Made Aware appropriate to see   Exercises   Quad Sets Supine;10 reps;AROM; Bilateral   Heelslides Supine;10 reps;AROM; Bilateral   Knee AROM Long Arc Quad Sitting;10 reps;AROM; Bilateral   Ankle Pumps Supine;Sitting;20 reps;AROM; Bilateral   Assessment   Prognosis Good   Problem List Decreased strength;Decreased endurance; Impaired balance;Decreased mobility;Pain;Decreased safety awareness   Assessment Patient was moderate assist x1 for supine to sit transfer  Patient able to sit EOB extended time, use of bed/rails for UE support required  Patient was moderate assist x1 with use of grab bar and RW for toilet transfer  Patient able to ambulate increased distances this session with use of RW, moderate assist initially changing to minimal assist x1, assist of second for lines/chair follow  Patient fatigues quickly, would recommend a chair follow for further distances  Patient required assistance for bed mobilty/repositioning  Patient limited this session by pain and fatigue and would continue to benefit from physical therapy to improve functional mobilty until medically cleared  Goals   Patient Goals to have less pain    STG Expiration Date 03/12/22   Short Term Goal #1 In 10 days pt will complete: 1) Bed mobility skills with S to increase safety and independence as well as decrease caregiver burden  2) Functional transfers with S to promote increased independence, safety, and QOL  3) Ambulate 150' using least restrictive AD with S without LOB and stable vitals so that pt can negotiate previous living environment safely and promote independence with functional mobility and return to PLOF  4) Stair training up/ down 3 step/s using rail/s with S so that pt can enter/negotiate previous living environment safely and decrease fall risk  5) Improve balance grades by 1/2 grade to increase safety with all mobility and decrease fall risk    6) Improve BLE strength by 1/2 grade to help increase overall functional mobility and decrease fall risk   PT Treatment Day 1   Plan   Treatment/Interventions Functional transfer training;LE strengthening/ROM; Therapeutic exercise; Endurance training;Patient/family training;Bed mobility;Gait training;Spoke to nursing   Progress Progressing toward goals   PT Frequency Other (Comment)  (3-6x/week)   Recommendation   PT Discharge Recommendation Post acute rehabilitation services   AM-PAC Basic Mobility Inpatient   Turning in Bed Without Bedrails 3   Lying on Back to Sitting on Edge of Flat Bed 2   Moving Bed to Chair 2   Standing Up From Chair 2   Walk in Room 2   Climb 3-5 Stairs 2   Basic Mobility Inpatient Raw Score 13   Basic Mobility Standardized Score 33 99   Highest Level Of Mobility   -[facility] Goal 4: Move to chair/commode   JH-HLM Highest Level of Mobility 7: Walk 25 feet or more   JH-HLM Goal Achieved Yes     An AM-PAC Basic Mobility standardized score less than 40 78 suggests the patient may benefit from discharge to post-acute rehab services      Codie Alarcon, PTA

## 2022-03-04 NOTE — ASSESSMENT & PLAN NOTE
- Acute pain secondary to traumatic injuries  - 3/3 EDC placed by APS  Will continue multimodal analgesic regimen at this time and resume home MSIR 15 mg q 12 in addition to p r n  Oxycodone and scheduled Tylenol, methocarbamol, gabapentin and Lidoderm patches  - Bowel regimen as long as using opioids   - Continue to monitor pain and adjust regimen as indicated

## 2022-03-04 NOTE — CASE MANAGEMENT
Case Management Discharge Planning Note    Patient name Aron Median  Location [address] [place] [address] [address]/[facility] 958-19 MRN [id number]  : 1955 Date 3/4/2022       Current Admission Date: 3/1/2022  Current Admission Diagnosis:Closed fracture of multiple ribs of right side   Patient Active Problem List    Diagnosis Date Noted    Closed fracture of multiple ribs of right side 2022    Traumatic pneumothorax 2022    Closed fracture of transverse process of lumbar vertebra ([facility] [address] ) 2022    Liver contusion 2022    Fall 2022    Acute pain due to trauma 2022    Right shoulder pain 2022    Severe protein-calorie malnutrition ([facility] [address] ) 2022    Undifferentiated connective tissue disease ([facility] [address] ) 2021    Primary generalized (osteo)arthritis 2021    Peripheral neuropathy 2021    Senile osteoporosis 2021    Hx of compression fracture of spine 2021    Lumbar spondylosis 2021    Cervical spondylosis 2021    Anemia 2021    Chronic pain syndrome     Asthma     Ambulatory dysfunction     Acute metabolic encephalopathy     Acute cystitis without hematuria     Seronegative arthropathy of multiple sites ([facility] [address] )     Gastroesophageal reflux disease without esophagitis     Mixed stress and urge urinary incontinence     CHF (congestive heart failure) (HCC)       LOS (days): 3  Geometric Mean LOS (GMLOS) (days): 4 30  Days to GMLOS:1 9     OBJECTIVE:  Risk of Unplanned Readmission Score: 13         Current admission status: Inpatient   Preferred Pharmacy:   [address] [place], [place] [address] [address]  Phone: [phone] Fax: [phone]    Primary Care Provider: Magaly Orozco DO    Primary Insurance: MEDICARE  Secondary Insurance: [facility]    DISCHARGE DETAILS:       Cm met with pt to discuss the possible booster   Pt fully aware of reasons why booster was offered and how it helps her for admittance into [facility]  Pt would like the opportunity to discuss with her children before making the decision  CM will follow up later today

## 2022-03-04 NOTE — ASSESSMENT & PLAN NOTE
- Multiple right-sided rib fractures (10-12th), present on admission   - Continue rib fracture protocol   - Continue to encourage incentive spirometer use and adequate pulmonary hygiene  Currently pulling 1-1 2L on I S   - Continue multimodal analgesic regimen  Appreciate APS evaluation and recommendations  Continue epidural catheter  - Supplemental oxygen via nasal cannula as needed to maintain saturations greater than or equal to 94%  Currently on room air    - Repeat chest x-ray from 3/2 reviewed showing small apical pneumothorax and small effusion  Repeat chest x-ray in the evening of 3/2 reveals small stable right apical pneumothorax and effusion    - PT and OT evaluation and treatment as indicated  - Outpatient follow-up in the trauma clinic for re-evaluation in approximately 2 weeks

## 2022-03-04 NOTE — PLAN OF CARE
Problem: Potential for Falls  Goal: Patient will remain free of falls  Description: INTERVENTIONS:  - Educate patient/family on patient safety including physical limitations  - Instruct patient to call for assistance with activity   - Consult OT/PT to assist with strengthening/mobility   - Keep Call bell within reach  - Keep bed low and locked with side rails adjusted as appropriate  - Keep care items and personal belongings within reach  - Initiate and maintain comfort rounds  - Make Fall Risk Sign visible to staff  - Offer Toileting every 2 Hours, in advance of need  - Initiate/Maintain alarm  - Obtain necessary fall risk management equipment:  - Apply yellow socks and bracelet for high fall risk patients  - Consider moving patient to room near nurses station  Outcome: Progressing     Problem: MOBILITY - ADULT  Goal: Maintain or return to baseline ADL function  Description: INTERVENTIONS:  -  Assess patient's ability to carry out ADLs; assess patient's baseline for ADL function and identify physical deficits which impact ability to perform ADLs (bathing, care of mouth/teeth, toileting, grooming, dressing, etc )  - Assess/evaluate cause of self-care deficits   - Assess range of motion  - Assess patient's mobility; develop plan if impaired  - Assess patient's need for assistive devices and provide as appropriate  - Encourage maximum independence but intervene and supervise when necessary  - Involve family in performance of ADLs  - Assess for home care needs following discharge   - Consider OT consult to assist with ADL evaluation and planning for discharge  - Provide patient education as appropriate  Outcome: Progressing  Goal: Maintains/Returns to pre admission functional level  Description: INTERVENTIONS:  - Perform BMAT or MOVE assessment daily    - Set and communicate daily mobility goal to care team and patient/family/caregiver     - Collaborate with rehabilitation services on mobility goals if consulted  - Out of bed to chair   - Out of bed for meals   - Out of bed for toileting  - Record patient progress and toleration of activity level   Outcome: Progressing     Problem: Prexisting or High Potential for Compromised Skin Integrity  Goal: Skin integrity is maintained or improved  Description: INTERVENTIONS:  - Identify patients at risk for skin breakdown  - Assess and monitor skin integrity  - Assess and monitor nutrition and hydration status  - Monitor labs   - Assess for incontinence   - Turn and reposition patient  - Assist with mobility/ambulation  - Relieve pressure over bony prominences  - Avoid friction and shearing  - Provide appropriate hygiene as needed including keeping skin clean and dry  - Evaluate need for skin moisturizer/barrier cream  - Collaborate with interdisciplinary team   - Patient/family teaching  - Consider wound care consult   Outcome: Progressing     Problem: PAIN - ADULT  Goal: Verbalizes/displays adequate comfort level or baseline comfort level  Description: Interventions:  - Encourage patient to monitor pain and request assistance  - Assess pain using appropriate pain scale  - Administer analgesics based on type and severity of pain and evaluate response  - Implement non-pharmacological measures as appropriate and evaluate response  - Consider cultural and social influences on pain and pain management  - Notify physician/advanced practitioner if interventions unsuccessful or patient reports new pain  Outcome: Progressing     Problem: Nutrition/Hydration-ADULT  Goal: Nutrient/Hydration intake appropriate for improving, restoring or maintaining nutritional needs  Description: Monitor and assess patient's nutrition/hydration status for malnutrition  Collaborate with interdisciplinary team and initiate plan and interventions as ordered  Monitor patient's weight and dietary intake as ordered or per policy  Utilize nutrition screening tool and intervene as necessary   Determine patient's food preferences and provide high-protein, high-caloric foods as appropriate       INTERVENTIONS:  - Monitor oral intake, urinary output, labs, and treatment plans  - Assess nutrition and hydration status and recommend course of action  - Evaluate amount of meals eaten  - Assist patient with eating if necessary   - Allow adequate time for meals  - Recommend/ encourage appropriate diets, oral nutritional supplements, and vitamin/mineral supplements  - Order, calculate, and assess calorie counts as needed  - Assess need for intravenous fluids  - Provide specific nutrition/hydration education as appropriate  - Include patient/family/caregiver in decisions related to nutrition  Outcome: Progressing     Problem: RESPIRATORY - ADULT  Goal: Achieves optimal ventilation and oxygenation  Description: INTERVENTIONS:  - Assess for changes in respiratory status  - Assess for changes in mentation and behavior  - Position to facilitate oxygenation and minimize respiratory effort  - Oxygen administered by appropriate delivery if ordered  - Initiate smoking cessation education as indicated  - Encourage broncho-pulmonary hygiene including cough, deep breathe, Incentive Spirometry  - Assess the need for suctioning and aspirate as needed  - Assess and instruct to report SOB or any respiratory difficulty  - Respiratory Therapy support as indicated  Outcome: Progressing     Problem: MUSCULOSKELETAL - ADULT  Goal: Maintain or return mobility to safest level of function  Description: INTERVENTIONS:  - Assess patient's ability to carry out ADLs; assess patient's baseline for ADL function and identify physical deficits which impact ability to perform ADLs (bathing, care of mouth/teeth, toileting, grooming, dressing, etc )  - Assess/evaluate cause of self-care deficits   - Assess range of motion  - Assess patient's mobility  - Assess patient's need for assistive devices and provide as appropriate  - Encourage maximum independence but intervene and supervise when necessary  - Involve family in performance of ADLs  - Assess for home care needs following discharge   - Consider OT consult to assist with ADL evaluation and planning for discharge  - Provide patient education as appropriate  Outcome: Progressing  Goal: Maintain proper alignment of affected body part  Description: INTERVENTIONS:  - Support, maintain and protect limb and body alignment  - Provide patient/ family with appropriate education  Outcome: Progressing

## 2022-03-04 NOTE — PROGRESS NOTES
Called by the overnight nurse for bleeding noted in the epidural dressing as well as the epidural pump intermittently exhibiting the downstream occlusion alarm  Upon inspection, there appears to be a hematoma that has developed subcutaneously under the epidural dressing  4 cm x 4 cm  The amount of blood shown in the occlusive dressing is quite normal, but the hematoma is not and large enough to suggest that the catheter might have been dislodged from the epidural space as the hematoma expands and the dressing is fixed to the skin  The patient says that the epidural hasn't helped as much as she had hoped  Her pain is still a 7/10, though she was resting comfortably prior to my arrival  She has sharp pain with movement  Her neuro exam remains intact and this makes sense as the hematoma appears very superficial     I spoke with the patient and her nurse and we will hold her AM heparin dose and reassess in the morning  If her hematoma hasn't expanded further and her pain control appears adequate, maybe keep the epidural if it provides some relief  Otherwise, we could remove and potentially replace or do some sort of nerve block (MARGIE block as it is posterior fracture)  Please call for any questions

## 2022-03-05 LAB
ANION GAP SERPL CALCULATED.3IONS-SCNC: 4 MMOL/L (ref 4–13)
BASOPHILS # BLD AUTO: 0.01 THOUSANDS/ΜL (ref 0–0.1)
BASOPHILS NFR BLD AUTO: 0 % (ref 0–1)
BUN SERPL-MCNC: 6 MG/DL (ref 5–25)
CALCIUM SERPL-MCNC: 10.5 MG/DL (ref 8.3–10.1)
CHLORIDE SERPL-SCNC: 110 MMOL/L (ref 100–108)
CO2 SERPL-SCNC: 27 MMOL/L (ref 21–32)
CREAT SERPL-MCNC: 0.7 MG/DL (ref 0.6–1.3)
EOSINOPHIL # BLD AUTO: 0 THOUSAND/ΜL (ref 0–0.61)
EOSINOPHIL NFR BLD AUTO: 0 % (ref 0–6)
ERYTHROCYTE [DISTWIDTH] IN BLOOD BY AUTOMATED COUNT: 13.4 % (ref 11.6–15.1)
GFR SERPL CREATININE-BSD FRML MDRD: 89 ML/MIN/1.73SQ M
GLUCOSE SERPL-MCNC: 97 MG/DL (ref 65–140)
HCT VFR BLD AUTO: 36.6 % (ref 34.8–46.1)
HGB BLD-MCNC: 11.8 G/DL (ref 11.5–15.4)
IMM GRANULOCYTES # BLD AUTO: 0.01 THOUSAND/UL (ref 0–0.2)
IMM GRANULOCYTES NFR BLD AUTO: 0 % (ref 0–2)
LYMPHOCYTES # BLD AUTO: 1.57 THOUSANDS/ΜL (ref 0.6–4.47)
LYMPHOCYTES NFR BLD AUTO: 35 % (ref 14–44)
MCH RBC QN AUTO: 30.3 PG (ref 26.8–34.3)
MCHC RBC AUTO-ENTMCNC: 32.2 G/DL (ref 31.4–37.4)
MCV RBC AUTO: 94 FL (ref 82–98)
MONOCYTES # BLD AUTO: 0.63 THOUSAND/ΜL (ref 0.17–1.22)
MONOCYTES NFR BLD AUTO: 14 % (ref 4–12)
NEUTROPHILS # BLD AUTO: 2.31 THOUSANDS/ΜL (ref 1.85–7.62)
NEUTS SEG NFR BLD AUTO: 51 % (ref 43–75)
NRBC BLD AUTO-RTO: 0 /100 WBCS
PLATELET # BLD AUTO: 200 THOUSANDS/UL (ref 149–390)
PMV BLD AUTO: 10.2 FL (ref 8.9–12.7)
POTASSIUM SERPL-SCNC: 3.8 MMOL/L (ref 3.5–5.3)
RBC # BLD AUTO: 3.89 MILLION/UL (ref 3.81–5.12)
SODIUM SERPL-SCNC: 141 MMOL/L (ref 136–145)
WBC # BLD AUTO: 4.53 THOUSAND/UL (ref 4.31–10.16)

## 2022-03-05 PROCEDURE — 85025 COMPLETE CBC W/AUTO DIFF WBC: CPT | Performed by: NURSE PRACTITIONER

## 2022-03-05 PROCEDURE — 99232 SBSQ HOSP IP/OBS MODERATE 35: CPT | Performed by: ANESTHESIOLOGY

## 2022-03-05 PROCEDURE — 80048 BASIC METABOLIC PNL TOTAL CA: CPT | Performed by: NURSE PRACTITIONER

## 2022-03-05 PROCEDURE — 99232 SBSQ HOSP IP/OBS MODERATE 35: CPT | Performed by: SURGERY

## 2022-03-05 RX ORDER — POLYETHYLENE GLYCOL 3350 17 G/17G
17 POWDER, FOR SOLUTION ORAL DAILY PRN
Status: DISCONTINUED | OUTPATIENT
Start: 2022-03-05 | End: 2022-03-08 | Stop reason: HOSPADM

## 2022-03-05 RX ORDER — AMOXICILLIN 250 MG
1 CAPSULE ORAL 2 TIMES DAILY
Status: DISCONTINUED | OUTPATIENT
Start: 2022-03-05 | End: 2022-03-08 | Stop reason: HOSPADM

## 2022-03-05 RX ADMIN — MORPHINE SULFATE 15 MG: 15 TABLET, FILM COATED, EXTENDED RELEASE ORAL at 21:05

## 2022-03-05 RX ADMIN — CARVEDILOL 25 MG: 25 TABLET, FILM COATED ORAL at 16:06

## 2022-03-05 RX ADMIN — HYDROXYCHLOROQUINE SULFATE 200 MG: 200 TABLET, FILM COATED ORAL at 17:18

## 2022-03-05 RX ADMIN — OXYCODONE HYDROCHLORIDE 10 MG: 10 TABLET ORAL at 14:11

## 2022-03-05 RX ADMIN — ACETAMINOPHEN 975 MG: 325 TABLET ORAL at 05:35

## 2022-03-05 RX ADMIN — ACETAMINOPHEN 975 MG: 325 TABLET ORAL at 21:05

## 2022-03-05 RX ADMIN — DOCUSATE SODIUM 100 MG: 100 CAPSULE ORAL at 08:21

## 2022-03-05 RX ADMIN — PANTOPRAZOLE SODIUM 40 MG: 40 TABLET, DELAYED RELEASE ORAL at 05:36

## 2022-03-05 RX ADMIN — METHOCARBAMOL 500 MG: 500 TABLET ORAL at 23:47

## 2022-03-05 RX ADMIN — LORAZEPAM 1 MG: 1 TABLET ORAL at 21:05

## 2022-03-05 RX ADMIN — BUPROPION HYDROCHLORIDE 450 MG: 100 TABLET, FILM COATED ORAL at 08:22

## 2022-03-05 RX ADMIN — HEPARIN SODIUM 5000 UNITS: 5000 INJECTION INTRAVENOUS; SUBCUTANEOUS at 21:05

## 2022-03-05 RX ADMIN — LIDOCAINE 5% 1 PATCH: 700 PATCH TOPICAL at 08:23

## 2022-03-05 RX ADMIN — OXYCODONE HYDROCHLORIDE 5 MG: 5 TABLET ORAL at 23:47

## 2022-03-05 RX ADMIN — MORPHINE SULFATE 15 MG: 15 TABLET, FILM COATED, EXTENDED RELEASE ORAL at 05:36

## 2022-03-05 RX ADMIN — ROPIVACAINE HYDROCHLORIDE: 2 INJECTION, SOLUTION EPIDURAL; INFILTRATION at 16:48

## 2022-03-05 RX ADMIN — GABAPENTIN 200 MG: 100 CAPSULE ORAL at 16:06

## 2022-03-05 RX ADMIN — METHOCARBAMOL 500 MG: 500 TABLET ORAL at 05:36

## 2022-03-05 RX ADMIN — LAMOTRIGINE 150 MG: 100 TABLET ORAL at 21:04

## 2022-03-05 RX ADMIN — GABAPENTIN 200 MG: 100 CAPSULE ORAL at 08:21

## 2022-03-05 RX ADMIN — GABAPENTIN 200 MG: 100 CAPSULE ORAL at 21:04

## 2022-03-05 RX ADMIN — CARVEDILOL 25 MG: 25 TABLET, FILM COATED ORAL at 08:21

## 2022-03-05 RX ADMIN — METHOCARBAMOL 500 MG: 500 TABLET ORAL at 01:49

## 2022-03-05 RX ADMIN — HEPARIN SODIUM 5000 UNITS: 5000 INJECTION INTRAVENOUS; SUBCUTANEOUS at 05:36

## 2022-03-05 RX ADMIN — HYDROMORPHONE HYDROCHLORIDE 0.5 MG: 1 INJECTION, SOLUTION INTRAMUSCULAR; INTRAVENOUS; SUBCUTANEOUS at 16:12

## 2022-03-05 RX ADMIN — METHOCARBAMOL 500 MG: 500 TABLET ORAL at 17:16

## 2022-03-05 RX ADMIN — HEPARIN SODIUM 5000 UNITS: 5000 INJECTION INTRAVENOUS; SUBCUTANEOUS at 14:11

## 2022-03-05 RX ADMIN — DOCUSATE SODIUM 100 MG: 100 CAPSULE ORAL at 17:17

## 2022-03-05 RX ADMIN — HYDROMORPHONE HYDROCHLORIDE 0.5 MG: 1 INJECTION, SOLUTION INTRAMUSCULAR; INTRAVENOUS; SUBCUTANEOUS at 01:49

## 2022-03-05 RX ADMIN — ROPIVACAINE HYDROCHLORIDE: 2 INJECTION, SOLUTION EPIDURAL; INFILTRATION at 05:16

## 2022-03-05 NOTE — PLAN OF CARE
Problem: Potential for Falls  Goal: Patient will remain free of falls  Description: INTERVENTIONS:  - Educate patient/family on patient safety including physical limitations  - Instruct patient to call for assistance with activity   - Consult OT/PT to assist with strengthening/mobility   - Keep Call bell within reach  - Keep bed low and locked with side rails adjusted as appropriate  - Keep care items and personal belongings within reach  - Initiate and maintain comfort rounds  - Make Fall Risk Sign visible to staff  - Offer Toileting every 2 Hours, in advance of need  - Initiate/Maintain alarm  - Obtain necessary fall risk management equipment:  - Apply yellow socks and bracelet for high fall risk patients  - Consider moving patient to room near nurses station  Outcome: Progressing     Problem: MOBILITY - ADULT  Goal: Maintain or return to baseline ADL function  Description: INTERVENTIONS:  -  Assess patient's ability to carry out ADLs; assess patient's baseline for ADL function and identify physical deficits which impact ability to perform ADLs (bathing, care of mouth/teeth, toileting, grooming, dressing, etc )  - Assess/evaluate cause of self-care deficits   - Assess range of motion  - Assess patient's mobility; develop plan if impaired  - Assess patient's need for assistive devices and provide as appropriate  - Encourage maximum independence but intervene and supervise when necessary  - Involve family in performance of ADLs  - Assess for home care needs following discharge   - Consider OT consult to assist with ADL evaluation and planning for discharge  - Provide patient education as appropriate  Outcome: Progressing  Goal: Maintains/Returns to pre admission functional level  Description: INTERVENTIONS:  - Perform BMAT or MOVE assessment daily    - Set and communicate daily mobility goal to care team and patient/family/caregiver     - Collaborate with rehabilitation services on mobility goals if consulted  - Out of bed to chair   - Out of bed for meals   - Out of bed for toileting  - Record patient progress and toleration of activity level   Outcome: Progressing     Problem: Prexisting or High Potential for Compromised Skin Integrity  Goal: Skin integrity is maintained or improved  Description: INTERVENTIONS:  - Identify patients at risk for skin breakdown  - Assess and monitor skin integrity  - Assess and monitor nutrition and hydration status  - Monitor labs   - Assess for incontinence   - Turn and reposition patient  - Assist with mobility/ambulation  - Relieve pressure over bony prominences  - Avoid friction and shearing  - Provide appropriate hygiene as needed including keeping skin clean and dry  - Evaluate need for skin moisturizer/barrier cream  - Collaborate with interdisciplinary team   - Patient/family teaching  - Consider wound care consult   Outcome: Progressing     Problem: PAIN - ADULT  Goal: Verbalizes/displays adequate comfort level or baseline comfort level  Description: Interventions:  - Encourage patient to monitor pain and request assistance  - Assess pain using appropriate pain scale  - Administer analgesics based on type and severity of pain and evaluate response  - Implement non-pharmacological measures as appropriate and evaluate response  - Consider cultural and social influences on pain and pain management  - Notify physician/advanced practitioner if interventions unsuccessful or patient reports new pain  Outcome: Progressing     Problem: Nutrition/Hydration-ADULT  Goal: Nutrient/Hydration intake appropriate for improving, restoring or maintaining nutritional needs  Description: Monitor and assess patient's nutrition/hydration status for malnutrition  Collaborate with interdisciplinary team and initiate plan and interventions as ordered  Monitor patient's weight and dietary intake as ordered or per policy  Utilize nutrition screening tool and intervene as necessary   Determine patient's food preferences and provide high-protein, high-caloric foods as appropriate       INTERVENTIONS:  - Monitor oral intake, urinary output, labs, and treatment plans  - Assess nutrition and hydration status and recommend course of action  - Evaluate amount of meals eaten  - Assist patient with eating if necessary   - Allow adequate time for meals  - Recommend/ encourage appropriate diets, oral nutritional supplements, and vitamin/mineral supplements  - Order, calculate, and assess calorie counts as needed  - Assess need for intravenous fluids  - Provide specific nutrition/hydration education as appropriate  - Include patient/family/caregiver in decisions related to nutrition  Outcome: Progressing     Problem: RESPIRATORY - ADULT  Goal: Achieves optimal ventilation and oxygenation  Description: INTERVENTIONS:  - Assess for changes in respiratory status  - Assess for changes in mentation and behavior  - Position to facilitate oxygenation and minimize respiratory effort  - Oxygen administered by appropriate delivery if ordered  - Initiate smoking cessation education as indicated  - Encourage broncho-pulmonary hygiene including cough, deep breathe, Incentive Spirometry  - Assess the need for suctioning and aspirate as needed  - Assess and instruct to report SOB or any respiratory difficulty  - Respiratory Therapy support as indicated  Outcome: Progressing     Problem: RESPIRATORY - ADULT  Goal: Achieves optimal ventilation and oxygenation  Description: INTERVENTIONS:  - Assess for changes in respiratory status  - Assess for changes in mentation and behavior  - Position to facilitate oxygenation and minimize respiratory effort  - Oxygen administered by appropriate delivery if ordered  - Initiate smoking cessation education as indicated  - Encourage broncho-pulmonary hygiene including cough, deep breathe, Incentive Spirometry  - Assess the need for suctioning and aspirate as needed  - Assess and instruct to report SOB or any respiratory difficulty  - Respiratory Therapy support as indicated  Outcome: Progressing     Problem: MUSCULOSKELETAL - ADULT  Goal: Maintain or return mobility to safest level of function  Description: INTERVENTIONS:  - Assess patient's ability to carry out ADLs; assess patient's baseline for ADL function and identify physical deficits which impact ability to perform ADLs (bathing, care of mouth/teeth, toileting, grooming, dressing, etc )  - Assess/evaluate cause of self-care deficits   - Assess range of motion  - Assess patient's mobility  - Assess patient's need for assistive devices and provide as appropriate  - Encourage maximum independence but intervene and supervise when necessary  - Involve family in performance of ADLs  - Assess for home care needs following discharge   - Consider OT consult to assist with ADL evaluation and planning for discharge  - Provide patient education as appropriate  Outcome: Progressing  Goal: Maintain proper alignment of affected body part  Description: INTERVENTIONS:  - Support, maintain and protect limb and body alignment  - Provide patient/ family with appropriate education  Outcome: Progressing

## 2022-03-05 NOTE — PLAN OF CARE
Problem: Potential for Falls  Goal: Patient will remain free of falls  Description: INTERVENTIONS:  - Educate patient/family on patient safety including physical limitations  - Instruct patient to call for assistance with activity   - Consult OT/PT to assist with strengthening/mobility   - Keep Call bell within reach  - Keep bed low and locked with side rails adjusted as appropriate  - Keep care items and personal belongings within reach  - Initiate and maintain comfort rounds  - Make Fall Risk Sign visible to staff  - Offer Toileting every 2 Hours, in advance of need  - Initiate/Maintain alarm  - Obtain necessary fall risk management equipment:  - Apply yellow socks and bracelet for high fall risk patients  - Consider moving patient to room near nurses station  3/5/2022 1143 by Catherine Cordova RN  Outcome: Progressing  3/5/2022 0745 by Catherine Cordova RN  Outcome: Progressing     Problem: MOBILITY - ADULT  Goal: Maintain or return to baseline ADL function  Description: INTERVENTIONS:  -  Assess patient's ability to carry out ADLs; assess patient's baseline for ADL function and identify physical deficits which impact ability to perform ADLs (bathing, care of mouth/teeth, toileting, grooming, dressing, etc )  - Assess/evaluate cause of self-care deficits   - Assess range of motion  - Assess patient's mobility; develop plan if impaired  - Assess patient's need for assistive devices and provide as appropriate  - Encourage maximum independence but intervene and supervise when necessary  - Involve family in performance of ADLs  - Assess for home care needs following discharge   - Consider OT consult to assist with ADL evaluation and planning for discharge  - Provide patient education as appropriate  3/5/2022 1143 by Catherine Cordova RN  Outcome: Progressing  3/5/2022 0745 by Catherine Cordova RN  Outcome: Progressing  Goal: Maintains/Returns to pre admission functional level  Description: INTERVENTIONS:  - Perform BMAT or MOVE assessment daily    - Set and communicate daily mobility goal to care team and patient/family/caregiver     - Collaborate with rehabilitation services on mobility goals if consulted  - Out of bed to chair   - Out of bed for meals   - Out of bed for toileting  - Record patient progress and toleration of activity level   3/5/2022 1143 by Jazmín Porter RN  Outcome: Progressing  3/5/2022 0745 by Jazmín Porter RN  Outcome: Progressing     Problem: Prexisting or High Potential for Compromised Skin Integrity  Goal: Skin integrity is maintained or improved  Description: INTERVENTIONS:  - Identify patients at risk for skin breakdown  - Assess and monitor skin integrity  - Assess and monitor nutrition and hydration status  - Monitor labs   - Assess for incontinence   - Turn and reposition patient  - Assist with mobility/ambulation  - Relieve pressure over bony prominences  - Avoid friction and shearing  - Provide appropriate hygiene as needed including keeping skin clean and dry  - Evaluate need for skin moisturizer/barrier cream  - Collaborate with interdisciplinary team   - Patient/family teaching  - Consider wound care consult   3/5/2022 1143 by Jazmín Porter RN  Outcome: Progressing  3/5/2022 0745 by Jazmín Porter RN  Outcome: Progressing     Problem: PAIN - ADULT  Goal: Verbalizes/displays adequate comfort level or baseline comfort level  Description: Interventions:  - Encourage patient to monitor pain and request assistance  - Assess pain using appropriate pain scale  - Administer analgesics based on type and severity of pain and evaluate response  - Implement non-pharmacological measures as appropriate and evaluate response  - Consider cultural and social influences on pain and pain management  - Notify physician/advanced practitioner if interventions unsuccessful or patient reports new pain  3/5/2022 1143 by Jazmín Porter RN  Outcome: Progressing  3/5/2022 0745 by Jazmín Porter RN  Outcome: Progressing     Problem: Nutrition/Hydration-ADULT  Goal: Nutrient/Hydration intake appropriate for improving, restoring or maintaining nutritional needs  Description: Monitor and assess patient's nutrition/hydration status for malnutrition  Collaborate with interdisciplinary team and initiate plan and interventions as ordered  Monitor patient's weight and dietary intake as ordered or per policy  Utilize nutrition screening tool and intervene as necessary  Determine patient's food preferences and provide high-protein, high-caloric foods as appropriate       INTERVENTIONS:  - Monitor oral intake, urinary output, labs, and treatment plans  - Assess nutrition and hydration status and recommend course of action  - Evaluate amount of meals eaten  - Assist patient with eating if necessary   - Allow adequate time for meals  - Recommend/ encourage appropriate diets, oral nutritional supplements, and vitamin/mineral supplements  - Order, calculate, and assess calorie counts as needed  - Assess need for intravenous fluids  - Provide specific nutrition/hydration education as appropriate  - Include patient/family/caregiver in decisions related to nutrition  3/5/2022 1143 by Juvenal Dash RN  Outcome: Progressing  3/5/2022 0745 by Juvenal Dash RN  Outcome: Progressing     Problem: RESPIRATORY - ADULT  Goal: Achieves optimal ventilation and oxygenation  Description: INTERVENTIONS:  - Assess for changes in respiratory status  - Assess for changes in mentation and behavior  - Position to facilitate oxygenation and minimize respiratory effort  - Oxygen administered by appropriate delivery if ordered  - Initiate smoking cessation education as indicated  - Encourage broncho-pulmonary hygiene including cough, deep breathe, Incentive Spirometry  - Assess the need for suctioning and aspirate as needed  - Assess and instruct to report SOB or any respiratory difficulty  - Respiratory Therapy support as indicated  3/5/2022 1143 by Olena Saunders RN  Outcome: Progressing  3/5/2022 0745 by Olena Saunders RN  Outcome: Progressing     Problem: MUSCULOSKELETAL - ADULT  Goal: Maintain or return mobility to safest level of function  Description: INTERVENTIONS:  - Assess patient's ability to carry out ADLs; assess patient's baseline for ADL function and identify physical deficits which impact ability to perform ADLs (bathing, care of mouth/teeth, toileting, grooming, dressing, etc )  - Assess/evaluate cause of self-care deficits   - Assess range of motion  - Assess patient's mobility  - Assess patient's need for assistive devices and provide as appropriate  - Encourage maximum independence but intervene and supervise when necessary  - Involve family in performance of ADLs  - Assess for home care needs following discharge   - Consider OT consult to assist with ADL evaluation and planning for discharge  - Provide patient education as appropriate  3/5/2022 1143 by Olena Saunders RN  Outcome: Progressing  3/5/2022 0745 by Olena Saunders RN  Outcome: Progressing  Goal: Maintain proper alignment of affected body part  Description: INTERVENTIONS:  - Support, maintain and protect limb and body alignment  - Provide patient/ family with appropriate education  3/5/2022 1143 by Olena Saunders RN  Outcome: Progressing  3/5/2022 0745 by Olena Saunders RN  Outcome: Progressing

## 2022-03-05 NOTE — PROGRESS NOTES
[address] [facility]  Progress Note - Alverta Mercury 1955, 78 y o  female MRN: [id number]  Unit/Bed#: [facility] 620-01 Encounter: [id number]  Primary Care Provider: Aida Morrell DO   Date and time admitted to hospital: 3/1/2022 11:32 PM    [address]  - mechanical fall  -sustained the below stated injuries  -PT OT evaluations recommending inpatient rehab  -case management for dispo planning    Closed fracture of transverse process of lumbar vertebra (HCC)  Assessment & Plan  - pain control with multimodal analgesic regimen  - quick draw brace deferred due to right-sided 10th through 12th rib fractures which will likely interfere with bracing  - no intervention or neurosurgical evaluation necessary    Traumatic pneumothorax  Assessment & Plan  - seen on admission CT chest  - f/u CXR on 3/2 shows small apical pneumothorax and effusion  Repeat chest x-ray approximately 8 hours later reveals stable small apical pneumothorax and effusion   -saturating in the high 90s on room air, no supplemental oxygen required  - encourage pulmonary toilette/IS    * Closed fracture of multiple ribs of right side  Assessment & Plan  - Multiple right-sided rib fractures (10-12th), present on admission   - Continue rib fracture protocol   - Continue to encourage incentive spirometer use and adequate pulmonary hygiene  Currently pulling 1-1 2L on I S   - Continue multimodal analgesic regimen  Appreciate APS evaluation and recommendations  Continue epidural catheter  - Supplemental oxygen via nasal cannula as needed to maintain saturations greater than or equal to 94%  Currently on room air    - Repeat chest x-ray from 3/2 reviewed showing small apical pneumothorax and small effusion  Repeat chest x-ray in the evening of 3/2 reveals small stable right apical pneumothorax and effusion    - PT and OT evaluation and treatment as indicated    - Outpatient follow-up in the trauma clinic for re-evaluation in approximately 2 weeks  Acute pain due to trauma  Assessment & Plan  - Acute pain secondary to traumatic injuries  - 3/3 EDC placed by APS  Will continue multimodal analgesic regimen at this time and resume home MSIR 15 mg q 12 in addition to p r n  Oxycodone and scheduled Tylenol, methocarbamol, gabapentin and Lidoderm patches  - Bowel regimen as long as using opioids   - Continue to monitor pain and adjust regimen as indicated  Rheumatoid arthritis ([facility] [address] )  Assessment & Plan  · Continue home Plaquenil    Bipolar depression ([facility] [address] )  Assessment & Plan  · Continue home Wellbutrin and Lamictal    Severe protein-calorie malnutrition ([facility] [address] )  Assessment & Plan  Malnutrition Findings:   Adult Malnutrition type: Acute illness  Adult Degree of Malnutrition: Other severe protein calorie malnutrition (as evidence by moderate clavicle muscle wasting, moderate temple muscle wasting, mild dorsal hand muscle wasting, severe buccal fat loss  Treated with oral diet and nutrition supplementation)    BMI Findings: Body mass index is 21 97 kg/m²  Diet with supplements  Nutrition consult    Right shoulder pain  Assessment & Plan  -x-ray completed, no fracture  -pain control multimodal analgesic regimen  -may be referred pain from right-sided rib fractures as well  -PT OT     Liver contusion  Assessment & Plan  -hemoglobin stable  Abdomen is soft and nondistended with benign exam   -continue abdominal exam   - continue to monitor hemoglobin  -likely secondary to localized rib fractures with displacement adjacent to the liver          Disposition:  Pending placement    SUBJECTIVE:  Chief Complaint:  The catheter keeps my pain under control"    Subjective:  Patient states that her pain was under control throughout the evening  She states that the catheter has controlled her pain  She denies any shortness of breath or difficulty breathing  She confirms that she has been tolerating her diet    She denies any abdominal pain, nausea, vomiting  She notes having a bowel movement recently  She denies any other complaints  OBJECTIVE:   Vitals:   Temp:  [97 5 °F (36 4 °C)-97 9 °F (36 6 °C)] 97 9 °F (36 6 °C)  HR:  [80-94] 83  Resp:  [16-18] 16  BP: (121-142)/(62-88) 121/68    Intake/Output:  I/O       03/03 0701  03/04 0700 03/04 0701  03/05 0700 03/05 0701  03/06 0700    P  O  440 600     I V  (mL/kg) 171 5 (3) 234 (4) 91 7 (1 6)    Total Intake(mL/kg) 611 5 (10 5) 834 (14 4) 91 7 (1 6)    Urine (mL/kg/hr) 800 (0 6) 1000 (0 7)     Stool 0 0     Total Output 800 1000     Net -188 5 -166 1 +91 7           Unmeasured Urine Occurrence 1 x 2 x 2 x    Unmeasured Stool Occurrence 0 x 1 x          Nutrition: Diet Regular; Regular House  GI Proph/Bowel Reg:  Senna S, regular diet  VTE Prophylaxis:Sequential compression device (Venodyne)  and Heparin     Physical Exam:   GENERAL APPEARANCE:  No acute distress  NEURO:  GCS 15, touch sensation intact throughout  HEENT:  Normocephalic, atraumatic  CV: +2 radial and dorsalis pedis pulses, bilaterally  LUNGS:  Clear to auscultation, bilaterally  No orthopnea  No tachypnea  Minimal right lower rib pain  GI:  Abdomen is soft nontender    :  Pelvis stable  MSK:  No deformities  SKIN:  Warm, dry    Invasive Devices  Report    Peripheral Intravenous Line            Peripheral IV 03/01/22 Left Antecubital 3 days          Epidural Line            Epidural Catheter 03/03/22 2 days          Drain            External Urinary Catheter 2 days                 PIC Score  PIC Pain Score: 1 (3/5/2022  4:12 PM)  PIC Incentive Spirometry Score: 2 (3/5/2022 11:00 AM)  PIC Cough Description: 2 (3/5/2022 11:00 AM)  PIC Total Score: 7 (3/5/2022 11:00 AM)       If the Total PIC Score </=5, did you consult APS and evaluate patient for further intervention?: yes      Pain:    Incentive Spirometry  Cough  3 = Controlled  4 = Above goal volume 3 = Strong  2 = Moderate  3 = Goal to alert volume 2 = Weak  1 = Severe  2 = Below alert volume 1 = Absent     1 = Unable to perform IS         Lab Results:   Results: I have personally reviewed all pertinent laboratory/tests results, BMP/CMP:   Lab Results   Component Value Date    SODIUM 141 03/05/2022    K 3 8 03/05/2022     (H) 03/05/2022    CO2 27 03/05/2022    BUN 6 03/05/2022    CREATININE 0 70 03/05/2022    CALCIUM 10 5 (H) 03/05/2022    EGFR 89 03/05/2022    and CBC:   Lab Results   Component Value Date    WBC 4 53 03/05/2022    HGB 11 8 03/05/2022    HCT 36 6 03/05/2022    MCV 94 03/05/2022     03/05/2022    MCH 30 3 03/05/2022    MCHC 32 2 03/05/2022    RDW 13 4 03/05/2022    MPV 10 2 03/05/2022    NRBC 0 03/05/2022     Imaging/EKG Studies: I have personally reviewed pertinent reports       Other Studies: none

## 2022-03-05 NOTE — ASSESSMENT & PLAN NOTE
-hemoglobin stable    Abdomen is soft and nondistended with benign exam   -continue abdominal exam   - continue to monitor hemoglobin  -likely secondary to localized rib fractures with displacement adjacent to the liver

## 2022-03-06 PROCEDURE — 97112 NEUROMUSCULAR REEDUCATION: CPT

## 2022-03-06 PROCEDURE — 99232 SBSQ HOSP IP/OBS MODERATE 35: CPT | Performed by: SURGERY

## 2022-03-06 PROCEDURE — 97530 THERAPEUTIC ACTIVITIES: CPT

## 2022-03-06 RX ADMIN — METHOCARBAMOL 500 MG: 500 TABLET ORAL at 17:11

## 2022-03-06 RX ADMIN — ACETAMINOPHEN 975 MG: 325 TABLET ORAL at 13:55

## 2022-03-06 RX ADMIN — GABAPENTIN 200 MG: 100 CAPSULE ORAL at 17:11

## 2022-03-06 RX ADMIN — LORAZEPAM 1 MG: 1 TABLET ORAL at 23:23

## 2022-03-06 RX ADMIN — METHOCARBAMOL 500 MG: 500 TABLET ORAL at 23:23

## 2022-03-06 RX ADMIN — METHOCARBAMOL 500 MG: 500 TABLET ORAL at 11:13

## 2022-03-06 RX ADMIN — DOCUSATE SODIUM 100 MG: 100 CAPSULE ORAL at 17:11

## 2022-03-06 RX ADMIN — LAMOTRIGINE 150 MG: 100 TABLET ORAL at 21:25

## 2022-03-06 RX ADMIN — BUPROPION HYDROCHLORIDE 450 MG: 100 TABLET, FILM COATED ORAL at 09:02

## 2022-03-06 RX ADMIN — ACETAMINOPHEN 975 MG: 325 TABLET ORAL at 05:33

## 2022-03-06 RX ADMIN — HYDROXYCHLOROQUINE SULFATE 200 MG: 200 TABLET, FILM COATED ORAL at 17:12

## 2022-03-06 RX ADMIN — HEPARIN SODIUM 5000 UNITS: 5000 INJECTION INTRAVENOUS; SUBCUTANEOUS at 05:34

## 2022-03-06 RX ADMIN — DOCUSATE SODIUM 100 MG: 100 CAPSULE ORAL at 09:02

## 2022-03-06 RX ADMIN — OXYCODONE HYDROCHLORIDE 10 MG: 10 TABLET ORAL at 17:11

## 2022-03-06 RX ADMIN — ENOXAPARIN SODIUM 30 MG: 30 INJECTION SUBCUTANEOUS at 21:25

## 2022-03-06 RX ADMIN — GABAPENTIN 200 MG: 100 CAPSULE ORAL at 09:02

## 2022-03-06 RX ADMIN — CARVEDILOL 25 MG: 25 TABLET, FILM COATED ORAL at 17:12

## 2022-03-06 RX ADMIN — METHOCARBAMOL 500 MG: 500 TABLET ORAL at 05:33

## 2022-03-06 RX ADMIN — MORPHINE SULFATE 15 MG: 15 TABLET, FILM COATED, EXTENDED RELEASE ORAL at 21:24

## 2022-03-06 RX ADMIN — ROPIVACAINE HYDROCHLORIDE: 2 INJECTION, SOLUTION EPIDURAL; INFILTRATION at 04:13

## 2022-03-06 RX ADMIN — MORPHINE SULFATE 15 MG: 15 TABLET, FILM COATED, EXTENDED RELEASE ORAL at 09:02

## 2022-03-06 RX ADMIN — LIDOCAINE 5% 1 PATCH: 700 PATCH TOPICAL at 09:03

## 2022-03-06 RX ADMIN — CARVEDILOL 25 MG: 25 TABLET, FILM COATED ORAL at 09:03

## 2022-03-06 RX ADMIN — HYDROXYCHLOROQUINE SULFATE 200 MG: 200 TABLET, FILM COATED ORAL at 09:02

## 2022-03-06 RX ADMIN — PANTOPRAZOLE SODIUM 40 MG: 40 TABLET, DELAYED RELEASE ORAL at 05:33

## 2022-03-06 RX ADMIN — GABAPENTIN 200 MG: 100 CAPSULE ORAL at 21:24

## 2022-03-06 RX ADMIN — OXYCODONE HYDROCHLORIDE 10 MG: 10 TABLET ORAL at 11:18

## 2022-03-06 RX ADMIN — ACETAMINOPHEN 975 MG: 325 TABLET ORAL at 21:24

## 2022-03-06 NOTE — RESTORATIVE TECHNICIAN NOTE
Restorative Technician Note      Patient Name: Car Chaves     Restorative Tech Visit Date: 03/06/22  Note Type: Mobility  Patient Position Upon Consult: Bedside chair  Activity Performed: Ambulated; Transferred  Assistive Device: Roller walker  Patient Position at End of Consult: Supine;  All needs within reach

## 2022-03-06 NOTE — ASSESSMENT & PLAN NOTE
- Acute pain secondary to traumatic injuries  - 3/3 EDC placed by APS  - 3/6 EDC removed  - Continue multimodal analgesic regimen, including home MSIR 15 mg q 12 in addition to p r n  Oxycodone/Dilaudid and scheduled Tylenol, methocarbamol, gabapentin and Lidoderm patches  - Bowel regimen as long as using opioids   - Continue to monitor pain and adjust regimen as indicated

## 2022-03-06 NOTE — PROGRESS NOTES
Epidural Follow-up Note - Acute Pain Service    Sun Burden 78 y o  female MRN: [id number]  Unit/Bed#: [facility] 620-01 Encounter: [id number]      Assessment:   Principal Problem:    Closed fracture of multiple ribs of right side  Active Problems:    Traumatic pneumothorax    Closed fracture of transverse process of lumbar vertebra (HCC)    Liver contusion    Fall    Acute pain due to trauma    Right shoulder pain    Severe protein-calorie malnutrition ([address] )    Bipolar depression ([address] )    Rheumatoid arthritis ([facility] [address] )      Sun Burden is a 78y o  year old female presented with severe acute chest wall pain from displaced right 10-12 rib fractures after a fall  An epidural was placed on 03/08/2022 pain management  Patient continues to do well from a pain perspective  Again notes that the epidural in a significant improvement  She is participating with her respiratory rehabilitation efforts found significant difficulty  Ambulating more elicit her baseline  No further concerns for hematoma in her StatLock site  Likely related to her prominent spinous process at this level  Discussed likelihood of epidural discontinuation within the next few days  Discussed gradual offset of epidural infusion  Patient agreeable    Plan:  Analgesia:  - Continue Thoracic epidural infusion of Ropivacaine 0 2% at 8 mL/hr continuous with 4 mL DD q15 minutes max 4 doses/hr  - Continue home MS Contin ER 15 mg PO q 12 hours  - Continue oxycodone 5mg/10 mg PO q4hrs PRN for moderate/severe pain  - Continue Dilaudid 0 5 mg q 4 hours p r n   For breakthrough   - Anticipate epidural removal and transition to primarily PO regimen likely tomorrow vs  Monday    Multimodal analgesia with:  -acetaminophen 975 mg PO q 8 hours scheduled  -gabapentin 100 mg PO t i d   -lidocaine 5% patch, 12 hours on and 12 hours off  -methocarbamol 500 mg PO q 6 hours scheduled    Bowel Regimen:  - Docusate (Colace) 100 mg PO twice daily  - Polyethylene glycol (Miralax) 17g PO once daily PRN  - Senokot 1 tab PO BID    APS will continue to follow  Please contact Acute Pain Service - [facility] via [employer] from [phone] with additional questions or concerns  See Lyndsey or Riley for additional contacts and after hours information  Pain History  Current pain location(s):  Right lateral chest wall  Pain Scale:  Variable, 2-8/10  Quality:  Sharp/stabbing, intermittent  24 hour history:  Pain remains adequately controlled with combination of thoracic epidural containing plain local anesthetic and patient's home MS Contin regimen with breakthrough oxycodone dosing  Patient has no complaints at this time  Notes ongoing breakthrough pain with coughing and deep breathing but does not feel significantly limited by pain  Ambulating without difficulty  PCEA use:  19/21  Opioid requirement previous 24 hours:  MS Contin 30 mg PO, oxycodone 20 mg PO, Dilaudid 0 5 mg IV    Meds/Allergies   all current active meds have been reviewed    Allergies   Allergen Reactions    Penicillins        Objective     Temp:  [97 2 °F (36 2 °C)-97 9 °F (36 6 °C)] 97 6 °F (36 4 °C)  HR:  [78-91] 91  Resp:  [16-19] 19  BP: (111-156)/(64-80) 111/64    Physical Exam  Vitals and nursing note reviewed  Constitutional:       Appearance: Normal appearance  Comments: Thin-appearing   HENT:      Head: Normocephalic  Mouth/Throat:      Mouth: Mucous membranes are dry  Eyes:      Pupils: Pupils are equal, round, and reactive to light  Cardiovascular:      Pulses: Normal pulses  Pulmonary:      Effort: No respiratory distress  Comments: Shallow excursions, nasal cannula in place  Chest:      Chest wall: Tenderness present  Abdominal:      General: Abdomen is flat  Palpations: Abdomen is soft  Musculoskeletal:         General: Normal range of motion        Comments: Bilateral lower extremity motor intact, ambulating with walker (baseline cane)   Skin:     General: Skin is warm and dry  Neurological:      General: No focal deficit present  Mental Status: She is alert and oriented to person, place, and time  Mental status is at baseline  Psychiatric:         Mood and Affect: Mood normal          Behavior: Behavior normal        Epidural: Site clean/dry/intact, no surrounding erythema/edema/induration, infusion functioning appropriately, protuberance beneath StatLock consistent with the spinous process at this level    Lab Results:   Results from last 7 days   Lab Units 03/05/22  0627   WBC Thousand/uL 4 53   HEMOGLOBIN g/dL 11 8   HEMATOCRIT % 36 6   PLATELETS Thousands/uL 200      Results from last 7 days   Lab Units 03/05/22  0626 03/04/22  0610 03/03/22  0545   POTASSIUM mmol/L 3 8   < > 3 8   CHLORIDE mmol/L 110*   < > 108   CO2 mmol/L 27   < > 26   BUN mg/dL 6   < > 11   CREATININE mg/dL 0 70   < > 0 93   CALCIUM mg/dL 10 5*   < > 9 8   ALK PHOS U/L  --   --  60   ALT U/L  --   --  15   AST U/L  --   --  20    < > = values in this interval not displayed  Results from last 7 days   Lab Units 03/02/22  0536   PTT seconds 29   INR  1 07       Imaging Studies: I have personally reviewed pertinent reports  EKG, Pathology, and Other Studies: I have personally reviewed pertinent reports  Please note that the APS provides consultative services regarding pain management only  With the exception of ketamine, peripheral nerve catheters, and epidural infusions (and except when indicated), final decisions regarding starting or changing doses of analgesic medications are at the discretion of the consulting service  Off hours consultation and/or medication management is generally not available      Seven Aguayo MD  Acute Pain Service

## 2022-03-06 NOTE — PLAN OF CARE
Problem: Potential for Falls  Goal: Patient will remain free of falls  Description: INTERVENTIONS:  - Educate patient/family on patient safety including physical limitations  - Instruct patient to call for assistance with activity   - Consult OT/PT to assist with strengthening/mobility   - Keep Call bell within reach  - Keep bed low and locked with side rails adjusted as appropriate  - Keep care items and personal belongings within reach  - Initiate and maintain comfort rounds  - Make Fall Risk Sign visible to staff  - Offer Toileting every 2 Hours, in advance of need  - Initiate/Maintain alarm  - Obtain necessary fall risk management equipment:  - Apply yellow socks and bracelet for high fall risk patients  - Consider moving patient to room near nurses station  3/5/2022 1930 by María Fernandez RN  Outcome: Progressing  3/5/2022 1930 by María Fernandez RN  Outcome: Progressing     Problem: MOBILITY - ADULT  Goal: Maintain or return to baseline ADL function  Description: INTERVENTIONS:  -  Assess patient's ability to carry out ADLs; assess patient's baseline for ADL function and identify physical deficits which impact ability to perform ADLs (bathing, care of mouth/teeth, toileting, grooming, dressing, etc )  - Assess/evaluate cause of self-care deficits   - Assess range of motion  - Assess patient's mobility; develop plan if impaired  - Assess patient's need for assistive devices and provide as appropriate  - Encourage maximum independence but intervene and supervise when necessary  - Involve family in performance of ADLs  - Assess for home care needs following discharge   - Consider OT consult to assist with ADL evaluation and planning for discharge  - Provide patient education as appropriate  3/5/2022 1930 by María Fernandez RN  Outcome: Progressing  3/5/2022 1930 by María Fernandez RN  Outcome: Progressing  Goal: Maintains/Returns to pre admission functional level  Description: INTERVENTIONS:  - Perform BMAT or MOVE assessment daily    - Set and communicate daily mobility goal to care team and patient/family/caregiver     - Collaborate with rehabilitation services on mobility goals if consulted  - Out of bed to chair   - Out of bed for meals   - Out of bed for toileting  - Record patient progress and toleration of activity level   3/5/2022 1930 by Breanna Sam RN  Outcome: Progressing  3/5/2022 1930 by Breanna Sam RN  Outcome: Progressing     Problem: Prexisting or High Potential for Compromised Skin Integrity  Goal: Skin integrity is maintained or improved  Description: INTERVENTIONS:  - Identify patients at risk for skin breakdown  - Assess and monitor skin integrity  - Assess and monitor nutrition and hydration status  - Monitor labs   - Assess for incontinence   - Turn and reposition patient  - Assist with mobility/ambulation  - Relieve pressure over bony prominences  - Avoid friction and shearing  - Provide appropriate hygiene as needed including keeping skin clean and dry  - Evaluate need for skin moisturizer/barrier cream  - Collaborate with interdisciplinary team   - Patient/family teaching  - Consider wound care consult   3/5/2022 1930 by Breanna Sam RN  Outcome: Progressing  3/5/2022 1930 by Breanna Sam RN  Outcome: Progressing     Problem: PAIN - ADULT  Goal: Verbalizes/displays adequate comfort level or baseline comfort level  Description: Interventions:  - Encourage patient to monitor pain and request assistance  - Assess pain using appropriate pain scale  - Administer analgesics based on type and severity of pain and evaluate response  - Implement non-pharmacological measures as appropriate and evaluate response  - Consider cultural and social influences on pain and pain management  - Notify physician/advanced practitioner if interventions unsuccessful or patient reports new pain  3/5/2022 1930 by Breanna Sam RN  Outcome: Progressing  3/5/2022 1930 by Breanna Sam RN  Outcome: Progressing Problem: Nutrition/Hydration-ADULT  Goal: Nutrient/Hydration intake appropriate for improving, restoring or maintaining nutritional needs  Description: Monitor and assess patient's nutrition/hydration status for malnutrition  Collaborate with interdisciplinary team and initiate plan and interventions as ordered  Monitor patient's weight and dietary intake as ordered or per policy  Utilize nutrition screening tool and intervene as necessary  Determine patient's food preferences and provide high-protein, high-caloric foods as appropriate       INTERVENTIONS:  - Monitor oral intake, urinary output, labs, and treatment plans  - Assess nutrition and hydration status and recommend course of action  - Evaluate amount of meals eaten  - Assist patient with eating if necessary   - Allow adequate time for meals  - Recommend/ encourage appropriate diets, oral nutritional supplements, and vitamin/mineral supplements  - Order, calculate, and assess calorie counts as needed  - Assess need for intravenous fluids  - Provide specific nutrition/hydration education as appropriate  - Include patient/family/caregiver in decisions related to nutrition  3/5/2022 1930 by Baron Claudia RN  Outcome: Progressing  3/5/2022 1930 by Baron Claudia RN  Outcome: Progressing     Problem: RESPIRATORY - ADULT  Goal: Achieves optimal ventilation and oxygenation  Description: INTERVENTIONS:  - Assess for changes in respiratory status  - Assess for changes in mentation and behavior  - Position to facilitate oxygenation and minimize respiratory effort  - Oxygen administered by appropriate delivery if ordered  - Initiate smoking cessation education as indicated  - Encourage broncho-pulmonary hygiene including cough, deep breathe, Incentive Spirometry  - Assess the need for suctioning and aspirate as needed  - Assess and instruct to report SOB or any respiratory difficulty  - Respiratory Therapy support as indicated  3/5/2022 1930 by Baron Taylor RN  Outcome: Progressing  3/5/2022 1930 by Argentina Gavin RN  Outcome: Progressing     Problem: MUSCULOSKELETAL - ADULT  Goal: Maintain or return mobility to safest level of function  Description: INTERVENTIONS:  - Assess patient's ability to carry out ADLs; assess patient's baseline for ADL function and identify physical deficits which impact ability to perform ADLs (bathing, care of mouth/teeth, toileting, grooming, dressing, etc )  - Assess/evaluate cause of self-care deficits   - Assess range of motion  - Assess patient's mobility  - Assess patient's need for assistive devices and provide as appropriate  - Encourage maximum independence but intervene and supervise when necessary  - Involve family in performance of ADLs  - Assess for home care needs following discharge   - Consider OT consult to assist with ADL evaluation and planning for discharge  - Provide patient education as appropriate  3/5/2022 1930 by Argentina Gavin RN  Outcome: Progressing  3/5/2022 1930 by Argentina Gavin RN  Outcome: Progressing  Goal: Maintain proper alignment of affected body part  Description: INTERVENTIONS:  - Support, maintain and protect limb and body alignment  - Provide patient/ family with appropriate education  3/5/2022 1930 by Argentina Gavin RN  Outcome: Progressing  3/5/2022 1930 by Argentina Gavin RN  Outcome: Progressing

## 2022-03-06 NOTE — PROGRESS NOTES
[address] [facility]  Progress Note - Tavo Emms 1955, 78 y o  female MRN: [id number]  Unit/Bed#: [facility] 620-01 Encounter: [id number]  Primary Care Provider: Jaycee Martinez DO   Date and time admitted to hospital: 3/1/2022 11:32 PM    [address]  - mechanical fall  -sustained the below stated injuries  -PT OT evaluations recommending inpatient rehab  -case management for dispo planning    Closed fracture of transverse process of lumbar vertebra (HCC)  Assessment & Plan  - pain control with multimodal analgesic regimen  - quick draw brace deferred due to right-sided 10th through 12th rib fractures which will likely interfere with bracing  - no intervention or neurosurgical evaluation necessary    Traumatic pneumothorax  Assessment & Plan  - seen on admission CT chest  - f/u CXR on 3/2 shows small apical pneumothorax and effusion  Repeat chest x-ray approximately 8 hours later reveals stable small apical pneumothorax and effusion   -saturating in the high 90s on room air, no supplemental oxygen required  - encourage pulmonary toilette/IS    * Closed fracture of multiple ribs of right side  Assessment & Plan  - Multiple right-sided rib fractures (10-12th), present on admission   - Continue rib fracture protocol   - Continue to encourage incentive spirometer use and adequate pulmonary hygiene  Currently pulling 1L on I S   - Continue multimodal analgesic regimen  Appreciate APS evaluation and recommendations  Continue epidural catheter  - Supplemental oxygen via nasal cannula as needed to maintain saturations greater than or equal to 94%  Currently on room air    - Repeat chest x-ray from 3/2 reviewed showing small apical pneumothorax and small effusion  Repeat chest x-ray in the evening of 3/2 reveals small stable right apical pneumothorax and effusion    - PT and OT evaluation and treatment as indicated    - Outpatient follow-up in the trauma clinic for re-evaluation in approximately 2 weeks  Acute pain due to trauma  Assessment & Plan  - Acute pain secondary to traumatic injuries  - 3/3 EDC placed by APS  - 3/6 EDC removed  - Continue multimodal analgesic regimen, including home MSIR 15 mg q 12 in addition to p r n  Oxycodone/Dilaudid and scheduled Tylenol, methocarbamol, gabapentin and Lidoderm patches  - Bowel regimen as long as using opioids   - Continue to monitor pain and adjust regimen as indicated  Rheumatoid arthritis ([facility] [address] )  Assessment & Plan  · Continue home Plaquenil    Bipolar depression ([facility] [address] )  Assessment & Plan  · Continue home Wellbutrin and Lamictal    Severe protein-calorie malnutrition ([facility] [address] )  Assessment & Plan  Malnutrition Findings:   Adult Malnutrition type: Acute illness  Adult Degree of Malnutrition: Other severe protein calorie malnutrition (as evidence by moderate clavicle muscle wasting, moderate temple muscle wasting, mild dorsal hand muscle wasting, severe buccal fat loss  Treated with oral diet and nutrition supplementation)    BMI Findings: Body mass index is 21 97 kg/m²  Diet with supplements  Nutrition consult    Right shoulder pain  Assessment & Plan  - x-ray completed, no fracture  - pain control multimodal analgesic regimen  - continue to monitor  - PT OT     Liver contusion  Assessment & Plan  - hemoglobin stable  Abdomen is soft and nondistended with benign exam   - continue abdominal exam   - continue to monitor hemoglobin  - likely secondary to localized rib fractures with displacement adjacent to the liver          Disposition:  Rehab placement pending    SUBJECTIVE:  Chief Complaint:  I actually got sleep last night    Subjective:  Patient describes her pain being controlled throughout the night  She states that she was able to get sleep throughout the evening  She describes that her pain continues to improve daily    While she does express anxiety associated with removal of epidural, she does agree to epidural pause and assessment of pain with oral/IV regiment  She confirms that her respiratory status continues to be stable  She denies any shortness of breath difficulty breathing  She continues to use ir incentive spirometry and is inspiring 750-1000 mL  She confirms that she has been tolerating her diet and had a bowel movement earlier this morning  She denies any other complaints or concerns  OBJECTIVE:   Vitals:   Temp:  [97 2 °F (36 2 °C)-97 9 °F (36 6 °C)] 97 6 °F (36 4 °C)  HR:  [78-91] 91  Resp:  [16-19] 19  BP: (111-156)/(64-80) 111/64    Intake/Output:  I/O       03/04 0701  03/05 0700 03/05 0701  03/06 0700 03/06 0701  03/07 0700    P  O  600  240    I V  (mL/kg) 234 (4) 214 7 (3 7)     Total Intake(mL/kg) 834 (14 4) 214 7 (3 7) 240 (4 1)    Urine (mL/kg/hr) 1000 (0 7)      Stool 0      Total Output 1000      Net -166 1 +214 7 +240           Unmeasured Urine Occurrence 2 x 2 x 1 x    Unmeasured Stool Occurrence 1 x  1 x         Nutrition: Diet Regular; Regular House  GI Proph/Bowel Reg:  Senna S  VTE Prophylaxis:Heparin     Physical Exam:   GENERAL APPEARANCE:  No acute distress  NEURO:  GCS is 15  HEENT:  Normocephalic, atraumatic  Neck supple  CV: +2 radial dorsalis pedis pulses, bilaterally  LUNGS:  Clear to auscultation, bilaterally  No orthopnea  No tachypnea  Patient is inspiring 1 L on IS  No wheezing, no rhonchi, no rales  GI:  Abdomen is soft nontender  Bowel sounds are present  :  Pelvis stable  MSK:  No deformities  SKIN:  Warm, dry      Invasive Devices  Report    Peripheral Intravenous Line            Peripheral IV 03/06/22 Left;Upper Arm <1 day                 PIC Score  PIC Pain Score: 2 (3/6/2022 11:34 AM)  PIC Incentive Spirometry Score: 3 (3/6/2022 11:34 AM)  PIC Cough Description: 3 (3/6/2022 11:34 AM)  PIC Total Score: 8 (3/6/2022 11:34 AM)       If the Total PIC Score </=5, did you consult APS and evaluate patient for further intervention?: yes      Pain: Incentive Spirometry  Cough  3 = Controlled  4 = Above goal volume 3 = Strong  2 = Moderate  3 = Goal to alert volume 2 = Weak  1 = Severe  2 = Below alert volume 1 = Absent     1 = Unable to perform IS         Lab Results: Results: I have personally reviewed all pertinent laboratory/tests results, BMP/CMP: No results found for: SODIUM, K, CL, CO2, ANIONGAP, BUN, CREATININE, GLUCOSE, CALCIUM, AST, ALT, ALKPHOS, PROT, BILITOT, EGFR and CBC: No results found for: WBC, HGB, HCT, MCV, PLT, ADJUSTEDWBC, MCH, MCHC, RDW, MPV, NRBC  Imaging/EKG Studies: I have personally reviewed pertinent reports       Other Studies: none

## 2022-03-06 NOTE — PROGRESS NOTES
Epidural Follow-up Note - Acute Pain Service    Sophie Beltrán 78 y o  female MRN: [id number]  Unit/Bed#: [facility] 620-01 Encounter: [id number]      Assessment:   Principal Problem:    Closed fracture of multiple ribs of right side  Active Problems:    Traumatic pneumothorax    Closed fracture of transverse process of lumbar vertebra (HCC)    Liver contusion    Fall    Acute pain due to trauma    Right shoulder pain    Severe protein-calorie malnutrition ([address] )    Bipolar depression ([facility] [address] )    Rheumatoid arthritis ([facility] [address] )        Sophie Beltrán is a 78y o  year old female presented with severe acute chest wall pain from displaced right 10-12 rib fractures after a fall  An epidural was placed on 03/08/2022 pain management  No further concerns for hematoma in her StatLock site  Likely related to her prominent spinous process at this level/scoliosis  This morning the patient complains of 2/10 right chest wall pain that is worse with certain movements  She denies any nausea or vomiting  She is not using supplemental O2  She can get up to 750ml on her ISB  Discussed possible removal of epidural today, patient is anxious but amenable  Plan:  Analgesia:  - Epidural catheter removed today tip intact around 1250 after a successful pause trial  Prophylactic heparin can be continued as scheduled, or patient can be switched to prophylactic Lovenox no earlier than 1650 per JARVIS anticoagulation guidelines  - Continue home MS Contin ER 15 mg PO q 12 hours  - Continue oxycodone 5mg/10 mg PO q4hrs PRN for moderate/severe pain  - Continue Dilaudid 0 5 mg q 4 hours p r n  For breakthrough     Multimodal analgesia with:  -acetaminophen 975 mg PO q 8 hours scheduled  -gabapentin 100 mg PO t i d   -lidocaine 5% patch, 12 hours on and 12 hours off  -methocarbamol 500 mg PO q 6 hours scheduled    Bowel Regimen:  Per primary team    APS will continue to follow   Please contact Acute Pain Service - [facility] via [employer] from [phone] with additional questions or concerns  See Lyndsey Lin for additional contacts and after hours information      Pain History  Current pain location(s): right chest  Pain Scale:   2/10  Quality: sharp  24 hour history: good pain control    PCEA use:  Opioid requirement previous 24 hours: MSContin 15mg x2, oxycodone 10mg x2, 5mg x1    Meds/Allergies   all current active meds have been reviewed and current meds:   Current Facility-Administered Medications   Medication Dose Route Frequency    acetaminophen (TYLENOL) tablet 975 mg  975 mg Oral Q8H [address]    albuterol (PROVENTIL HFA,VENTOLIN HFA) inhaler 2 puff  2 puff Inhalation Q6H PRN    buPROPion (WELLBUTRIN) tablet 450 mg  450 mg Oral Daily    carvedilol (COREG) tablet 25 mg  25 mg Oral BID With Meals    docusate sodium (COLACE) capsule 100 mg  100 mg Oral BID    gabapentin (NEURONTIN) capsule 200 mg  200 mg Oral TID    heparin (porcine) subcutaneous injection 5,000 Units  5,000 Units Subcutaneous Q8H [address]    HYDROmorphone (DILAUDID) injection 0 5 mg  0 5 mg Intravenous Q4H PRN    hydroxychloroquine (PLAQUENIL) tablet 200 mg  200 mg Oral BID    lamoTRIgine (LaMICtal) tablet 150 mg  150 mg Oral HS    lidocaine (LIDODERM) 5 % patch 1 patch  1 patch Topical Daily    LORazepam (ATIVAN) tablet 1 mg  1 mg Oral HS    methocarbamol (ROBAXIN) tablet 500 mg  500 mg Oral Q6H [address]    morphine (MS CONTIN) ER tablet 15 mg  15 mg Oral Q12H [address]    ondansetron (ZOFRAN) injection 4 mg  4 mg Intravenous Q6H PRN    oxyCODONE (ROXICODONE) immediate release tablet 10 mg  10 mg Oral Q4H PRN    oxyCODONE (ROXICODONE) IR tablet 5 mg  5 mg Oral Q4H PRN    pantoprazole (PROTONIX) EC tablet 40 mg  40 mg Oral Early Morning    polyethylene glycol (MIRALAX) packet 17 g  17 g Oral Daily PRN    ropivacaine 0 2% PCEA   Epidural Continuous    senna-docusate sodium (SENOKOT S) 8 6-50 mg per tablet 1 tablet  1 tablet Oral BID    sodium chloride (OCEAN) 0 65 % nasal spray 1 spray 1 spray Each Nare Q2H PRN       Allergies   Allergen Reactions    Penicillins        Objective     Temp:  [97 2 °F (36 2 °C)-97 9 °F (36 6 °C)] 97 6 °F (36 4 °C)  HR:  [78-91] 91  Resp:  [16-19] 19  BP: (111-156)/(64-80) 111/64    Physical Exam  Vitals reviewed  Constitutional:       General: She is not in acute distress  Appearance: Normal appearance  HENT:      Head: Normocephalic and atraumatic  Eyes:      Extraocular Movements: Extraocular movements intact  Pupils: Pupils are equal, round, and reactive to light  Pulmonary:      Effort: Pulmonary effort is normal  No respiratory distress  Breath sounds: No stridor  No wheezing  Skin:     General: Skin is warm and dry  Neurological:      General: No focal deficit present  Mental Status: She is alert and oriented to person, place, and time  Psychiatric:         Mood and Affect: Mood normal          Behavior: Behavior normal        Epidural: Insertion site clean and dry with no signs of infection or bleeding    Lab Results:   Results from last 7 days   Lab Units 03/05/22  0627   WBC Thousand/uL 4 53   HEMOGLOBIN g/dL 11 8   HEMATOCRIT % 36 6   PLATELETS Thousands/uL 200      Results from last 7 days   Lab Units 03/05/22  0626 03/04/22  0610 03/03/22  0545   POTASSIUM mmol/L 3 8   < > 3 8   CHLORIDE mmol/L 110*   < > 108   CO2 mmol/L 27   < > 26   BUN mg/dL 6   < > 11   CREATININE mg/dL 0 70   < > 0 93   CALCIUM mg/dL 10 5*   < > 9 8   ALK PHOS U/L  --   --  60   ALT U/L  --   --  15   AST U/L  --   --  20    < > = values in this interval not displayed  Results from last 7 days   Lab Units 03/02/22  0536   PTT seconds 29   INR  1 07       Imaging Studies: I have personally reviewed pertinent reports  EKG, Pathology, and Other Studies: I have personally reviewed pertinent reports  Please note that the APS provides consultative services regarding pain management only    With the exception of ketamine, peripheral nerve catheters, and epidural infusions (and except when indicated), final decisions regarding starting or changing doses of analgesic medications are at the discretion of the consulting service  Off hours consultation and/or medication management is generally not available      [facility]  Acute Pain Service

## 2022-03-06 NOTE — ASSESSMENT & PLAN NOTE
- hemoglobin stable    Abdomen is soft and nondistended with benign exam   - continue abdominal exam   - continue to monitor hemoglobin  - likely secondary to localized rib fractures with displacement adjacent to the liver

## 2022-03-06 NOTE — PLAN OF CARE
Problem: Potential for Falls  Goal: Patient will remain free of falls  Description: INTERVENTIONS:  - Educate patient/family on patient safety including physical limitations  - Instruct patient to call for assistance with activity   - Consult OT/PT to assist with strengthening/mobility   - Keep Call bell within reach  - Keep bed low and locked with side rails adjusted as appropriate  - Keep care items and personal belongings within reach  - Initiate and maintain comfort rounds  - Make Fall Risk Sign visible to staff  - Offer Toileting every 2 Hours, in advance of need  - Initiate/Maintain alarm  - Obtain necessary fall risk management equipment:  - Apply yellow socks and bracelet for high fall risk patients  - Consider moving patient to room near nurses station  Outcome: Progressing     Problem: MOBILITY - ADULT  Goal: Maintain or return to baseline ADL function  Description: INTERVENTIONS:  -  Assess patient's ability to carry out ADLs; assess patient's baseline for ADL function and identify physical deficits which impact ability to perform ADLs (bathing, care of mouth/teeth, toileting, grooming, dressing, etc )  - Assess/evaluate cause of self-care deficits   - Assess range of motion  - Assess patient's mobility; develop plan if impaired  - Assess patient's need for assistive devices and provide as appropriate  - Encourage maximum independence but intervene and supervise when necessary  - Involve family in performance of ADLs  - Assess for home care needs following discharge   - Consider OT consult to assist with ADL evaluation and planning for discharge  - Provide patient education as appropriate  Outcome: Progressing  Goal: Maintains/Returns to pre admission functional level  Description: INTERVENTIONS:  - Perform BMAT or MOVE assessment daily    - Set and communicate daily mobility goal to care team and patient/family/caregiver     - Collaborate with rehabilitation services on mobility goals if consulted  - Out of bed to chair   - Out of bed for meals   - Out of bed for toileting  - Record patient progress and toleration of activity level   Outcome: Progressing

## 2022-03-06 NOTE — PHYSICAL THERAPY NOTE
Physical Therapy Progress Note     03/06/22 1100   PT Last Visit   PT Visit Date 03/06/22   Note Type   Note Type Treatment   Pain Assessment   Pain Assessment Tool 0-10   Pain Score 8   Pain Location/Orientation Orientation: Right;Location: Rib Cage   Hospital Pain Intervention(s) Repositioned; Ambulation/increased activity; Emotional support   Restrictions/Precautions   Other Precautions Pain; Fall Risk;Bed Alarm; Chair Alarm   Subjective   Subjective The pt  notes that her pain is getting bad  Transfers   Sit to Stand 4  Minimal assistance   Additional items Assist x 1; Increased time required;Verbal cues   Stand to Sit 4  Minimal assistance   Additional items Assist x 1; Increased time required;Verbal cues   Ambulation/Elevation   Gait pattern Step to;Excessively slow; Short stride; Inconsistent anna;Decreased foot clearance   Gait Assistance 4  Minimal assist   Additional items Assist x 1;Verbal cues   Assistive Device Rolling walker   Distance 10 feet x 2  Balance   Static Sitting Fair +   Dynamic Sitting Fair   Static Standing Fair -   Ambulatory Poor +   Activity Tolerance   Activity Tolerance Patient tolerated treatment well;Patient limited by pain   Exercises   Balance training  Static and dynamic activities at the sink after utilizing the bathroom  Assessment   Prognosis Good   Problem List Decreased strength;Decreased endurance; Impaired balance;Decreased mobility;Pain;Decreased safety awareness   Assessment The pt  was limited due to pain today, and she was only able to ambulate to and from the bathroom  She was, however, able to perform extended ADLs at the sink after utilizing the bathroom  She does fatigue with activity, but she is progressing in this regard  She had no loss of balance while at the sink  She was in the chair post session with all needs within reach  Barriers to Discharge Inaccessible home environment;Decreased caregiver support   Goals   Patient Goals To have less pain     STG Expiration Date 03/12/22   PT Treatment Day 2   Plan   Treatment/Interventions Functional transfer training;LE strengthening/ROM; Therapeutic exercise; Endurance training;Cognitive reorientation;Patient/family training;Bed mobility;Gait training;Elevations   Progress Progressing toward goals   PT Frequency   (3-6x a week )   Recommendation   PT Discharge Recommendation Post acute rehabilitation services   Equipment Recommended [facility] Recommended Wheeled walker   AM-PAC Basic Mobility Inpatient   Turning in Bed Without Bedrails 3   Lying on Back to Sitting on Edge of Flat Bed 3   Moving Bed to Chair 3   Standing Up From Chair 3   Walk in Room 3   Climb 3-5 Stairs 2   Basic Mobility Inpatient Raw Score 17   Basic Mobility Standardized Score 39 67   Highest Level Of Mobility   JH-HLM Goal 5: Stand one or more mins   JH-HLM Highest Level of Mobility 6: Walk 10 steps or more   JH-HLM Goal Achieved Yes     An AM-PAC Basic Mobility standardized score less than 40 78 suggests the patient may benefit from discharge to post-acute rehab services      Flores Laird, PTA

## 2022-03-06 NOTE — PLAN OF CARE
Problem: PHYSICAL THERAPY ADULT  Goal: Performs mobility at highest level of function for planned discharge setting  See evaluation for individualized goals  Description: Treatment/Interventions: Functional transfer training,LE strengthening/ROM,Elevations,Therapeutic exercise,Endurance training,Patient/family training,Equipment eval/education,Bed mobility,Gait training,Spoke to nursing,OT  Equipment Recommended:  (continue to assess )       See flowsheet documentation for full assessment, interventions and recommendations  Outcome: Progressing  Note: Prognosis: Good  Problem List: Decreased strength,Decreased endurance,Impaired balance,Decreased mobility,Pain,Decreased safety awareness  Assessment: The pt  was limited due to pain today, and she was only able to ambulate to and from the bathroom  She was, however, able to perform extended ADLs at the sink after utilizing the bathroom  She does fatigue with activity, but she is progressing in this regard  She had no loss of balance while at the sink  She was in the chair post session with all needs within reach  Barriers to Discharge: Inaccessible home environment,Decreased caregiver support        PT Discharge Recommendation: Post acute rehabilitation services          See flowsheet documentation for full assessment

## 2022-03-06 NOTE — ASSESSMENT & PLAN NOTE
- x-ray completed, no fracture  - pain control multimodal analgesic regimen  - continue to monitor  - PT OT

## 2022-03-06 NOTE — ASSESSMENT & PLAN NOTE
- Multiple right-sided rib fractures (10-12th), present on admission   - Continue rib fracture protocol   - Continue to encourage incentive spirometer use and adequate pulmonary hygiene  Currently pulling 1L on I S   - Continue multimodal analgesic regimen  Appreciate APS evaluation and recommendations  Continue epidural catheter  - Supplemental oxygen via nasal cannula as needed to maintain saturations greater than or equal to 94%  Currently on room air    - Repeat chest x-ray from 3/2 reviewed showing small apical pneumothorax and small effusion  Repeat chest x-ray in the evening of 3/2 reveals small stable right apical pneumothorax and effusion    - PT and OT evaluation and treatment as indicated  - Outpatient follow-up in the trauma clinic for re-evaluation in approximately 2 weeks

## 2022-03-07 LAB
FLUAV RNA RESP QL NAA+PROBE: NEGATIVE
FLUBV RNA RESP QL NAA+PROBE: NEGATIVE
RSV RNA RESP QL NAA+PROBE: NEGATIVE
SARS-COV-2 RNA RESP QL NAA+PROBE: NEGATIVE

## 2022-03-07 PROCEDURE — 0241U HB NFCT DS VIR RESP RNA 4 TRGT: CPT | Performed by: NURSE PRACTITIONER

## 2022-03-07 PROCEDURE — 99232 SBSQ HOSP IP/OBS MODERATE 35: CPT | Performed by: EMERGENCY MEDICINE

## 2022-03-07 RX ADMIN — LORAZEPAM 1 MG: 1 TABLET ORAL at 21:37

## 2022-03-07 RX ADMIN — METHOCARBAMOL 500 MG: 500 TABLET ORAL at 12:36

## 2022-03-07 RX ADMIN — OXYCODONE HYDROCHLORIDE 10 MG: 10 TABLET ORAL at 06:04

## 2022-03-07 RX ADMIN — ENOXAPARIN SODIUM 30 MG: 30 INJECTION SUBCUTANEOUS at 21:37

## 2022-03-07 RX ADMIN — MORPHINE SULFATE 15 MG: 15 TABLET, FILM COATED, EXTENDED RELEASE ORAL at 09:56

## 2022-03-07 RX ADMIN — SENNOSIDES AND DOCUSATE SODIUM 1 TABLET: 50; 8.6 TABLET ORAL at 08:45

## 2022-03-07 RX ADMIN — LIDOCAINE 5% 1 PATCH: 700 PATCH TOPICAL at 08:45

## 2022-03-07 RX ADMIN — HYDROMORPHONE HYDROCHLORIDE 0.5 MG: 1 INJECTION, SOLUTION INTRAMUSCULAR; INTRAVENOUS; SUBCUTANEOUS at 08:46

## 2022-03-07 RX ADMIN — ACETAMINOPHEN 975 MG: 325 TABLET ORAL at 06:02

## 2022-03-07 RX ADMIN — ACETAMINOPHEN 975 MG: 325 TABLET ORAL at 14:05

## 2022-03-07 RX ADMIN — ENOXAPARIN SODIUM 30 MG: 30 INJECTION SUBCUTANEOUS at 08:45

## 2022-03-07 RX ADMIN — DOCUSATE SODIUM 100 MG: 100 CAPSULE ORAL at 08:45

## 2022-03-07 RX ADMIN — HYDROMORPHONE HYDROCHLORIDE 0.5 MG: 1 INJECTION, SOLUTION INTRAMUSCULAR; INTRAVENOUS; SUBCUTANEOUS at 15:43

## 2022-03-07 RX ADMIN — LAMOTRIGINE 150 MG: 100 TABLET ORAL at 21:37

## 2022-03-07 RX ADMIN — GABAPENTIN 200 MG: 100 CAPSULE ORAL at 21:37

## 2022-03-07 RX ADMIN — GABAPENTIN 200 MG: 100 CAPSULE ORAL at 08:45

## 2022-03-07 RX ADMIN — METHOCARBAMOL 500 MG: 500 TABLET ORAL at 06:02

## 2022-03-07 RX ADMIN — ACETAMINOPHEN 975 MG: 325 TABLET ORAL at 21:37

## 2022-03-07 RX ADMIN — CARVEDILOL 25 MG: 25 TABLET, FILM COATED ORAL at 08:45

## 2022-03-07 RX ADMIN — METHOCARBAMOL 500 MG: 500 TABLET ORAL at 17:05

## 2022-03-07 RX ADMIN — HYDROXYCHLOROQUINE SULFATE 200 MG: 200 TABLET, FILM COATED ORAL at 17:05

## 2022-03-07 RX ADMIN — PANTOPRAZOLE SODIUM 40 MG: 40 TABLET, DELAYED RELEASE ORAL at 06:02

## 2022-03-07 RX ADMIN — HYDROXYCHLOROQUINE SULFATE 200 MG: 200 TABLET, FILM COATED ORAL at 08:49

## 2022-03-07 RX ADMIN — MORPHINE SULFATE 15 MG: 15 TABLET, FILM COATED, EXTENDED RELEASE ORAL at 21:37

## 2022-03-07 RX ADMIN — GABAPENTIN 200 MG: 100 CAPSULE ORAL at 15:50

## 2022-03-07 RX ADMIN — BUPROPION HYDROCHLORIDE 450 MG: 100 TABLET, FILM COATED ORAL at 08:47

## 2022-03-07 RX ADMIN — OXYCODONE HYDROCHLORIDE 10 MG: 10 TABLET ORAL at 19:22

## 2022-03-07 RX ADMIN — OXYCODONE HYDROCHLORIDE 5 MG: 5 TABLET ORAL at 12:36

## 2022-03-07 NOTE — RESTORATIVE TECHNICIAN NOTE
Restorative Technician Note      Patient Name: Tavo Walker     Restorative Tech Visit Date: 03/07/22  Note Type: Mobility  Patient Position Upon Consult: Supine  Activity Performed: Ambulated; Transferred  Assistive Device: Roller walker  Patient Position at End of Consult: Bedside chair;  All needs within reach; Bed/Chair alarm activated

## 2022-03-07 NOTE — CASE MANAGEMENT
Case Management Discharge Planning Note    Patient name Jacquelin Singh  Location [address] [place] [address] [address]59 MRN [id number]  : 1955 Date 3/7/2022       Current Admission Date: 3/1/2022  Current Admission Diagnosis:Closed fracture of multiple ribs of right side   Patient Active Problem List    Diagnosis Date Noted    Bipolar depression ([facility] [address] ) 2022    [facility] 2022    Closed fracture of multiple ribs of right side 2022    Traumatic pneumothorax 2022    Closed fracture of transverse process of lumbar vertebra ([facility] [address] ) 2022    Liver contusion 2022    Fall 2022    Acute pain due to trauma 2022    Right shoulder pain 2022    Severe protein-calorie malnutrition ([facility] [address] ) 2022    Undifferentiated connective tissue disease ([facility] ) 2021    Primary generalized (osteo)arthritis 2021    Peripheral neuropathy 2021    Senile osteoporosis 2021    Hx of compression fracture of spine 2021    Lumbar spondylosis 2021    Cervical spondylosis 2021    Anemia 2021    Chronic pain syndrome     Asthma     Ambulatory dysfunction     Acute metabolic encephalopathy     Acute cystitis without hematuria     Seronegative arthropathy of multiple sites ([facility] [address] )     Gastroesophageal reflux disease without esophagitis     Mixed stress and urge urinary incontinence     CHF (congestive heart failure) (HCC)       LOS (days): 6  Geometric Mean LOS (GMLOS) (days): 5 20  Days to GMLOS:-0 5     OBJECTIVE:  Risk of Unplanned Readmission Score: 13         Current admission status: Inpatient   Preferred Pharmacy:   [address] [place], [place] - [address]  [address]  [address] [place] [address]  Phone: [phone] Fax: [phone]    Primary Care Provider: aSmi May DO    Primary Insurance: MEDICARE  Secondary Insurance: [facility]    DISCHARGE DETAILS:    Pt accepted to [facility]   Pt can d/c there tomorrow after 1400   CM submitted for wheelchair [place] transport for that time or after, via [facility]

## 2022-03-07 NOTE — ASSESSMENT & PLAN NOTE
- Acute pain secondary to traumatic injuries  - 3/3 EDC placed by APS  - 3/6 EDC removed  - Continue multimodal analgesic regimen, including home MSIR 15 mg q 12 in addition to PRN Oxycodone and scheduled Tylenol, methocarbamol, gabapentin and Lidoderm patches  - Bowel regimen as long as using opioids   - Continue to monitor pain and adjust regimen as indicated

## 2022-03-07 NOTE — RESTORATIVE TECHNICIAN NOTE
Restorative Technician Note      Patient Name: Kayla Benavides     Restorative Tech Visit Date: 03/07/22  Note Type: Mobility  Patient Position Upon Consult: Bedside chair  Activity Performed: Ambulated; Transferred; Stood  Assistive Device: Roller walker  Patient Position at End of Consult: Supine;  All needs within reach

## 2022-03-07 NOTE — PROGRESS NOTES
[facility] [facility]  Progress Note - Genet Old 1955, 78 y o  female MRN: [id number]  Unit/Bed#: [facility] 620-01 Encounter: [id number]  Primary Care Provider: Ansley Chanel DO   Date and time admitted to hospital: 3/1/2022 11:32 PM    [address]  - mechanical fall  -sustained the below stated injuries  -PT OT evaluations recommending inpatient rehab  -case management for dispo planning    Closed fracture of transverse process of lumbar vertebra (HCC)  Assessment & Plan  - pain control with multimodal analgesic regimen  - quick draw brace deferred due to right-sided 10th through 12th rib fractures which will likely interfere with bracing  - no intervention or neurosurgical evaluation necessary    Traumatic pneumothorax  Assessment & Plan  - seen on admission CT chest  - f/u CXR on 3/2 shows small apical pneumothorax and effusion  Repeat chest x-ray approximately 8 hours later reveals stable small apical pneumothorax and effusion   -saturating in the high 90s on room air, no supplemental oxygen required  - encourage pulmonary toilette/IS    * Closed fracture of multiple ribs of right side  Assessment & Plan  - Multiple right-sided rib fractures (10-12th), present on admission   - Continue rib fracture protocol   - Continue to encourage incentive spirometer use and adequate pulmonary hygiene  Currently pulling 1L on I S   - Continue multimodal analgesic regimen  Appreciate APS evaluation and recommendations  Continue epidural catheter  - Supplemental oxygen via nasal cannula as needed to maintain saturations greater than or equal to 94%  Currently on room air    - Repeat chest x-ray from 3/2 reviewed showing small apical pneumothorax and small effusion  Repeat chest x-ray in the evening of 3/2 reveals small stable right apical pneumothorax and effusion    - PT and OT evaluation and treatment as indicated    - Outpatient follow-up in the trauma clinic for re-evaluation in approximately 2 weeks  Acute pain due to trauma  Assessment & Plan  - Acute pain secondary to traumatic injuries  - 3/3 EDC placed by APS  - 3/6 EDC removed  - Continue multimodal analgesic regimen, including home MSIR 15 mg q 12 in addition to PRN Oxycodone and scheduled Tylenol, methocarbamol, gabapentin and Lidoderm patches  - Bowel regimen as long as using opioids   - Continue to monitor pain and adjust regimen as indicated  Rheumatoid arthritis ([facility] [address] )  Assessment & Plan  · Continue home Plaquenil    Bipolar depression ([facility] [address] )  Assessment & Plan  · Continue home Wellbutrin and Lamictal    Severe protein-calorie malnutrition ([facility] [address] )  Assessment & Plan  Malnutrition Findings:   Adult Malnutrition type: Acute illness  Adult Degree of Malnutrition: Other severe protein calorie malnutrition (as evidence by moderate clavicle muscle wasting, moderate temple muscle wasting, mild dorsal hand muscle wasting, severe buccal fat loss  Treated with oral diet and nutrition supplementation)    BMI Findings: Body mass index is 21 97 kg/m²  Diet with supplements  Nutrition consult    Right shoulder pain  Assessment & Plan  - x-ray completed, no fracture  - pain control multimodal analgesic regimen  - continue to monitor  - PT OT     Liver contusion  Assessment & Plan  - hemoglobin stable  Abdomen is soft and nondistended with benign exam   - continue abdominal exam   - continue to monitor hemoglobin  - likely secondary to localized rib fractures with displacement adjacent to the liver          Disposition:  Pending placement    SUBJECTIVE:  Chief Complaint:  Rib pain    Subjective:  Patient continues to note rib pain on right lateral side  She denies any feelings of shortness of breath  She continues to use her incentive spirometry and has gotten her numbers back up to 1 L, which previously was 750 mL after incentive spirometry has been removed    She confirms that she continues to ambulate the hallway regularly  She states that her pain is tolerable with her current pain regimen  She confirms that she continues to get sleep at night and tolerated diet  OBJECTIVE:   Vitals:   Temp:  [97 9 °F (36 6 °C)-98 1 °F (36 7 °C)] 98 °F (36 7 °C)  HR:  [] 82  Resp:  [14-18] 16  BP: ()/(45-76) 112/74    Intake/Output:  I/O       03/05 0701  03/06 0700 03/06 0701  03/07 0700 03/07 0701  03/08 0700    P  O   440     I V  (mL/kg) 214 7 (3 7)      Total Intake(mL/kg) 214 7 (3 7) 440 (7 6)     Urine (mL/kg/hr)       Stool       Total Output       Net +214 7 +440            Unmeasured Urine Occurrence 2 x 3 x 2 x    Unmeasured Stool Occurrence  1 x 1 x         Nutrition: Diet Regular; Regular House  GI Proph/Bowel Reg:  Senna S  VTE Prophylaxis:Sequential compression device (Venodyne)  and Enoxaparin (Lovenox)     Physical Exam:   GENERAL APPEARANCE:  No acute distress  NEURO:  GCS 15  Light touch sensation intact throughout  HEENT:  Normocephalic, atraumatic  Neck is supple  CV: RRR, +2 radial and dorsalis pedis pulses, bilaterally  LUNGS:  Clear to auscultation, bilaterally  No orthopnea  No tachypnea  Patient inspiring 1 L on IS  No supplemental oxygen required, patient saturating 96% on room air  Patient has tenderness on right lateral chest   GI:  Abdomen is soft and nontender      :  Pelvis stable  MSK:  No deformities  SKIN:  Warm, dry    Invasive Devices  Report    Peripheral Intravenous Line            Peripheral IV 03/06/22 Left;Upper Arm 1 day                 PIC Score  PIC Pain Score: 2 (3/7/2022 12:36 PM)  PIC Incentive Spirometry Score: 3 (3/7/2022 12:33 PM)  PIC Cough Description: 2 (3/7/2022 12:33 PM)  [facility] Total Score: 7 (3/7/2022 12:33 PM)       If the Total PIC Score </=5, did you consult APS and evaluate patient for further intervention?: yes      Pain:    Incentive Spirometry  Cough  3 = Controlled  4 = Above goal volume 3 = Strong  2 = Moderate  3 = Goal to alert volume 2 = Weak  1 = Severe  2 = Below alert volume 1 = Absent     1 = Unable to perform IS         Lab Results: Results: I have personally reviewed all pertinent laboratory/tests results, BMP/CMP: No results found for: SODIUM, K, CL, CO2, ANIONGAP, BUN, CREATININE, GLUCOSE, CALCIUM, AST, ALT, ALKPHOS, PROT, BILITOT, EGFR and CBC: No results found for: WBC, HGB, HCT, MCV, PLT, ADJUSTEDWBC, MCH, MCHC, RDW, MPV, NRBC  Imaging/EKG Studies: I have personally reviewed pertinent reports       Other Studies: none

## 2022-03-08 VITALS
HEART RATE: 100 BPM | TEMPERATURE: 97.8 F | BODY MASS INDEX: 21.85 KG/M2 | SYSTOLIC BLOOD PRESSURE: 116 MMHG | WEIGHT: 128 LBS | DIASTOLIC BLOOD PRESSURE: 80 MMHG | OXYGEN SATURATION: 94 % | HEIGHT: 64 IN | RESPIRATION RATE: 18 BRPM

## 2022-03-08 LAB
ANION GAP SERPL CALCULATED.3IONS-SCNC: 2 MMOL/L (ref 4–13)
BASOPHILS # BLD AUTO: 0.02 THOUSANDS/ΜL (ref 0–0.1)
BASOPHILS NFR BLD AUTO: 1 % (ref 0–1)
BUN SERPL-MCNC: 13 MG/DL (ref 5–25)
CALCIUM SERPL-MCNC: 10.5 MG/DL (ref 8.3–10.1)
CHLORIDE SERPL-SCNC: 111 MMOL/L (ref 100–108)
CO2 SERPL-SCNC: 27 MMOL/L (ref 21–32)
CREAT SERPL-MCNC: 0.91 MG/DL (ref 0.6–1.3)
EOSINOPHIL # BLD AUTO: 0.02 THOUSAND/ΜL (ref 0–0.61)
EOSINOPHIL NFR BLD AUTO: 1 % (ref 0–6)
ERYTHROCYTE [DISTWIDTH] IN BLOOD BY AUTOMATED COUNT: 13.9 % (ref 11.6–15.1)
GFR SERPL CREATININE-BSD FRML MDRD: 65 ML/MIN/1.73SQ M
GLUCOSE SERPL-MCNC: 103 MG/DL (ref 65–140)
HCT VFR BLD AUTO: 32.3 % (ref 34.8–46.1)
HGB BLD-MCNC: 10.6 G/DL (ref 11.5–15.4)
IMM GRANULOCYTES # BLD AUTO: 0.01 THOUSAND/UL (ref 0–0.2)
IMM GRANULOCYTES NFR BLD AUTO: 0 % (ref 0–2)
LYMPHOCYTES # BLD AUTO: 1.56 THOUSANDS/ΜL (ref 0.6–4.47)
LYMPHOCYTES NFR BLD AUTO: 38 % (ref 14–44)
MCH RBC QN AUTO: 29.5 PG (ref 26.8–34.3)
MCHC RBC AUTO-ENTMCNC: 32.8 G/DL (ref 31.4–37.4)
MCV RBC AUTO: 90 FL (ref 82–98)
MONOCYTES # BLD AUTO: 0.64 THOUSAND/ΜL (ref 0.17–1.22)
MONOCYTES NFR BLD AUTO: 16 % (ref 4–12)
NEUTROPHILS # BLD AUTO: 1.86 THOUSANDS/ΜL (ref 1.85–7.62)
NEUTS SEG NFR BLD AUTO: 44 % (ref 43–75)
NRBC BLD AUTO-RTO: 0 /100 WBCS
PLATELET # BLD AUTO: 221 THOUSANDS/UL (ref 149–390)
PMV BLD AUTO: 10 FL (ref 8.9–12.7)
POTASSIUM SERPL-SCNC: 3.9 MMOL/L (ref 3.5–5.3)
RBC # BLD AUTO: 3.59 MILLION/UL (ref 3.81–5.12)
SODIUM SERPL-SCNC: 140 MMOL/L (ref 136–145)
WBC # BLD AUTO: 4.11 THOUSAND/UL (ref 4.31–10.16)

## 2022-03-08 PROCEDURE — 99238 HOSP IP/OBS DSCHRG MGMT 30/<: CPT | Performed by: EMERGENCY MEDICINE

## 2022-03-08 PROCEDURE — 80048 BASIC METABOLIC PNL TOTAL CA: CPT | Performed by: NURSE PRACTITIONER

## 2022-03-08 PROCEDURE — 85025 COMPLETE CBC W/AUTO DIFF WBC: CPT | Performed by: NURSE PRACTITIONER

## 2022-03-08 PROCEDURE — NC001 PR NO CHARGE: Performed by: PHYSICIAN ASSISTANT

## 2022-03-08 RX ORDER — ECHINACEA PURPUREA EXTRACT 125 MG
1 TABLET ORAL AS NEEDED
Start: 2022-03-08

## 2022-03-08 RX ORDER — LIDOCAINE 50 MG/G
1 PATCH TOPICAL DAILY
Start: 2022-03-09

## 2022-03-08 RX ORDER — AMOXICILLIN 250 MG
1 CAPSULE ORAL 2 TIMES DAILY
Start: 2022-03-08

## 2022-03-08 RX ORDER — METHOCARBAMOL 500 MG/1
500 TABLET, FILM COATED ORAL EVERY 6 HOURS SCHEDULED
Refills: 0
Start: 2022-03-08 | End: 2022-04-07 | Stop reason: SINTOL

## 2022-03-08 RX ORDER — ACETAMINOPHEN 325 MG/1
975 TABLET ORAL EVERY 8 HOURS SCHEDULED
Start: 2022-03-08

## 2022-03-08 RX ORDER — LORAZEPAM 1 MG/1
1 TABLET ORAL
Qty: 3 TABLET | Refills: 0 | Status: SHIPPED | OUTPATIENT
Start: 2022-03-08 | End: 2022-03-11

## 2022-03-08 RX ORDER — OXYCODONE HYDROCHLORIDE 5 MG/1
5-10 TABLET ORAL EVERY 4 HOURS PRN
Qty: 36 TABLET | Refills: 0 | Status: SHIPPED | OUTPATIENT
Start: 2022-03-08 | End: 2022-03-11

## 2022-03-08 RX ORDER — POLYETHYLENE GLYCOL 3350 17 G/17G
17 POWDER, FOR SOLUTION ORAL DAILY PRN
Start: 2022-03-08

## 2022-03-08 RX ORDER — MORPHINE SULFATE 15 MG/1
15 TABLET, FILM COATED, EXTENDED RELEASE ORAL 2 TIMES DAILY
Qty: 6 TABLET | Refills: 0 | Status: SHIPPED | OUTPATIENT
Start: 2022-03-08 | End: 2022-03-11

## 2022-03-08 RX ADMIN — OXYCODONE HYDROCHLORIDE 10 MG: 10 TABLET ORAL at 06:41

## 2022-03-08 RX ADMIN — HYDROXYCHLOROQUINE SULFATE 200 MG: 200 TABLET, FILM COATED ORAL at 09:05

## 2022-03-08 RX ADMIN — BUPROPION HYDROCHLORIDE 450 MG: 100 TABLET, FILM COATED ORAL at 09:05

## 2022-03-08 RX ADMIN — ACETAMINOPHEN 975 MG: 325 TABLET ORAL at 05:46

## 2022-03-08 RX ADMIN — GABAPENTIN 200 MG: 100 CAPSULE ORAL at 09:03

## 2022-03-08 RX ADMIN — METHOCARBAMOL 500 MG: 500 TABLET ORAL at 05:46

## 2022-03-08 RX ADMIN — PANTOPRAZOLE SODIUM 40 MG: 40 TABLET, DELAYED RELEASE ORAL at 05:46

## 2022-03-08 RX ADMIN — ACETAMINOPHEN 975 MG: 325 TABLET ORAL at 13:58

## 2022-03-08 RX ADMIN — OXYCODONE HYDROCHLORIDE 10 MG: 10 TABLET ORAL at 12:45

## 2022-03-08 RX ADMIN — CARVEDILOL 25 MG: 25 TABLET, FILM COATED ORAL at 09:03

## 2022-03-08 RX ADMIN — SENNOSIDES AND DOCUSATE SODIUM 1 TABLET: 50; 8.6 TABLET ORAL at 09:03

## 2022-03-08 RX ADMIN — METHOCARBAMOL 500 MG: 500 TABLET ORAL at 12:46

## 2022-03-08 RX ADMIN — LIDOCAINE 5% 1 PATCH: 700 PATCH TOPICAL at 09:03

## 2022-03-08 RX ADMIN — OXYCODONE HYDROCHLORIDE 10 MG: 10 TABLET ORAL at 02:32

## 2022-03-08 RX ADMIN — ENOXAPARIN SODIUM 30 MG: 30 INJECTION SUBCUTANEOUS at 09:04

## 2022-03-08 RX ADMIN — MORPHINE SULFATE 15 MG: 15 TABLET, FILM COATED, EXTENDED RELEASE ORAL at 09:03

## 2022-03-08 NOTE — ASSESSMENT & PLAN NOTE
- Acute pain secondary to multiple traumatic injuries  - Appreciate APS evaluation, recommendations and interventions  - Patient had epidural catheter placed initially on 03/03/2022 and subsequently removed on 03/06/2022   - Patient subsequently transition to a multimodal oral analgesic regimen and maintains improved/adequate pain control at this time  - Continue bowel regimen while on opioid therapy

## 2022-03-08 NOTE — ASSESSMENT & PLAN NOTE
- Multiple right-sided rib fractures (10-12th), present on admission   - Continue rib fracture protocol   - Continue to encourage incentive spirometer use and adequate pulmonary hygiene  Currently pulling 1,250-1,500 mL on I S   - Continue multimodal analgesic regimen  Appreciate APS evaluation and recommendations  Continue epidural catheter  - Supplemental oxygen via nasal cannula as needed to maintain saturations greater than or equal to 94%  Currently on room air    - Repeat chest x-ray from 3/2/2022 reviewed showing small apical pneumothorax and small effusion  Repeat chest x-ray in the evening of 3/2/2022 reveals small stable right apical pneumothorax and effusion    - PT and OT evaluation and treatment as indicated  - Outpatient follow-up in the trauma clinic for re-evaluation in approximately 2 weeks

## 2022-03-08 NOTE — PROGRESS NOTES
[facility]  Progress Note - Deo Monroe 1955, 78 y o  female MRN: [id number]  Unit/Bed#: [facility] 620-01 Encounter: [id number]  Primary Care Provider: Dayna Wilkins DO   Date and time admitted to hospital: 3/1/2022 11:32 PM    Fall  Assessment & Plan  - Status post mechanical fall with the below noted injuries  - Fall precautions  - Geriatric Medicine consultation for evaluation, medication review and recommendations   - PT and OT evaluation and treatment as indicated  - Case Management consultation for disposition planning  * Closed fracture of multiple ribs of right side  Assessment & Plan  - Multiple right-sided rib fractures (10-12th), present on admission   - Continue rib fracture protocol   - Continue to encourage incentive spirometer use and adequate pulmonary hygiene  Currently pulling 1,250-1,500 mL on I S   - Continue multimodal analgesic regimen  Appreciate APS evaluation and recommendations  Continue epidural catheter  - Supplemental oxygen via nasal cannula as needed to maintain saturations greater than or equal to 94%  Currently on room air    - Repeat chest x-ray from 3/2/2022 reviewed showing small apical pneumothorax and small effusion  Repeat chest x-ray in the evening of 3/2/2022 reveals small stable right apical pneumothorax and effusion    - PT and OT evaluation and treatment as indicated  - Outpatient follow-up in the trauma clinic for re-evaluation in approximately 2 weeks  Traumatic pneumothorax  Assessment & Plan  - Right-sided pneumothorax, present on admission   - Management of rib fractures as noted  - Continue to encourage incentive spirometer use and adequate pulmonary hygiene  Currently pulling 1,250 - 1,500 mL on I S   - Supplemental oxygen via nasal cannula as needed  On room air, no supplemental oxygen required  - Repeat chest x-ray from 3/2/2022 reviewed     - Outpatient follow-up in the trauma clinic for re-evaluation in approximately 2 weeks  Liver contusion  Assessment & Plan  - Suspected liver contusion noted on initial imaging including CT scan  - Patient's abdominal exam had remained benign throughout her hospital encounter   - Patient without evidence of active or ongoing bleeding  She has had a stable hemoglobin  - May continue diet as tolerated  - Continue activity restrictions in setting of associated rib fractures  - Continue PT and OT evaluation and treatment as indicated  - Continue multimodal analgesic regimen as indicated  - Outpatient follow-up in the trauma clinic for re-evaluation in 2 weeks  Closed fracture of transverse process of lumbar vertebra [facility])  Assessment & Plan  - Lumbar spine transverse process fracture, present on presentation   - Deferring any bracing secondary to multiple right-sided lower rib fractures  - Continue multimodal analgesic regimen   - No further workup or intervention necessary for this injury at this time  - Outpatient follow-up in [address] as noted  Right shoulder pain  Assessment & Plan  - Right shoulder pain, present on presentation, now resolved  - Right shoulder x-ray reviewed without evidence of acute fracture  - Continue activity as tolerated with weight-bearing as tolerated on the right upper extremity  - Continue multimodal analgesic regimen   - Continue PT and OT evaluation and treatment as indicated  - Outpatient follow-up with Orthopedic surgery as needed  Acute pain due to trauma  Assessment & Plan  - Acute pain secondary to multiple traumatic injuries  - Appreciate APS evaluation, recommendations and interventions  - Patient had epidural catheter placed initially on 03/03/2022 and subsequently removed on 03/06/2022   - Patient subsequently transition to a multimodal oral analgesic regimen and maintains improved/adequate pain control at this time  - Continue bowel regimen while on opioid therapy      Severe protein-calorie malnutrition [facility])  Assessment & Plan  Malnutrition Findings:   Adult Malnutrition type: Acute illness  Adult Degree of Malnutrition: Other severe protein calorie malnutrition (as evidence by moderate clavicle muscle wasting, moderate temple muscle wasting, mild dorsal hand muscle wasting, severe buccal fat loss  Treated with oral diet and nutrition supplementation)    BMI Findings: Body mass index is 21 97 kg/m²  - Patient with severe protein calorie malnutrition   - Continue to encourage adequate oral intake  - Appreciate nutrition consultation and recommendations  - Continue with dietary supplements  - Outpatient follow-up with PCP  Bipolar depression ([place] [id number] )  Assessment & Plan  - Continue home Wellbutrin and Lamictal   - Outpatient follow-up per routine  Rheumatoid arthritis ([place] [id number] )  Assessment & Plan  - Continue home Plaquenil  - Outpatient follow-up per routine  Disposition:  Continue current level care with anticipated discharge today  SUBJECTIVE:  Chief Complaint:  My right side still hurts      Subjective:  Patient is overall doing well and is starting to feel better  She does continue to complain of right-sided pain mostly in her chest wall and underneath her right breast   She denies any significant shortness of breath or difficulty breathing  She notes her pain regimen is helping  She is tolerating her diet without nausea or vomiting  She has no new complaints at this time  OBJECTIVE:   Vitals:   Temp:  [97 2 °F (36 2 °C)-98 9 °F (37 2 °C)] 97 8 °F (36 6 °C)  HR:  [] 100  Resp:  [17-20] 18  BP: (113-137)/(68-80) 116/80    Intake/Output:  I/O       03/06 0701  03/07 0700 03/07 0701  03/08 0700 03/08 0701  03/09 0700    P  O  440 480     I V  (mL/kg)       Total Intake(mL/kg) 440 (7 6) 480 (8 3)     Net +440 +480            Unmeasured Urine Occurrence 3 x 5 x     Unmeasured Stool Occurrence 1 x 1 x          Nutrition: Diet Regular; Regular House  Discharge Diet  GI Proph/Bowel Reg: On Protonix for GI  On Senokot S for Bowel regimen  VTE Prophylaxis:Sequential compression device (Venodyne)  and Enoxaparin (Lovenox)     Physical Exam:   GENERAL APPEARANCE: Patient in no acute distress  HEENT: NCAT; PERRL, EOMs intact; Mucous membranes moist  CV: Regular rate and rhythm; no murmur/gallops/rubs appreciated  CHEST / LUNGS: Clear to auscultation; no wheezes/rales/rhonci  Mild to moderate right anterior lateral chest wall tenderness without crepitus or deformity  There was a significant area of ecchymosis on the right lower chest wall and flank extending down towards her right hip with intact and viable overlying skin  ABD: NABS; soft; non-distended; non-tender  :  Voiding spontaneously  EXT: +2 pulses bilaterally upper & lower extremities; no edema  NEURO: GCS 15; no focal neurologic deficits; neurovascularly intact  SKIN: Warm, dry and well perfused; no rash; no jaundice  Right lower chest wall and flank ecchymosis as noted above      Invasive Devices  Report    None                  PIC Score  PIC Pain Score: 1 (3/8/2022  1:58 PM)  PIC Incentive Spirometry Score: 4 (3/8/2022 12:45 PM)  PIC Cough Description: 2 (3/8/2022 12:45 PM)  [facility] Total Score: 7 (3/8/2022 12:45 PM)       If the Total PIC Score </=5, did you consult APS and evaluate patient for further intervention?: yes      Pain:    Incentive Spirometry  Cough  3 = Controlled  4 = Above goal volume 3 = Strong  2 = Moderate  3 = Goal to alert volume 2 = Weak  1 = Severe  2 = Below alert volume 1 = Absent     1 = Unable to perform IS         Lab Results:   Results: I have personally reviewed all pertinent laboratory/tests results, BMP/CMP:   Lab Results   Component Value Date    SODIUM 140 03/08/2022    K 3 9 03/08/2022     (H) 03/08/2022    CO2 27 03/08/2022    BUN 13 03/08/2022    CREATININE 0 91 03/08/2022    CALCIUM 10 5 (H) 03/08/2022    EGFR 65 03/08/2022    and CBC:   Lab Results   Component Value Date WBC 4 11 (L) 03/08/2022    HGB 10 6 (L) 03/08/2022    HCT 32 3 (L) 03/08/2022    MCV 90 03/08/2022     03/08/2022    MCH 29 5 03/08/2022    MCHC 32 8 03/08/2022    RDW 13 9 03/08/2022    MPV 10 0 03/08/2022    NRBC 0 03/08/2022     Imaging/EKG Studies: I have personally reviewed pertinent reports       Other Studies: N/A    Ying Dudley PA-C  3/8/2022 08:58 AM

## 2022-03-08 NOTE — DISCHARGE INSTRUCTIONS
Traumatic Rib Fracture and Solid Organ Injury Discharge Instructions: Your rib fractures will take time to heal  Rib fractures typically take at least 6-8 weeks to heal and may take longer  Your accident or injury caused a contusion or bruising of your liver  Bleeding may have occurred internally  The bleeding stops as a clot begins to form within the injured area  It is extremely important that you follow the instructions given to you by your doctors and nurses  You must limit your physical activity as instructed or you risk disrupting the clot that has formed within your injured organ  Serious internal bleeding may result  Activity:  - Continue PT and OT evaluation and treatment as indicated  - Recommend activity as tolerated with assistance  Further activity recommendations per rehab staff  - Walking and normal light activities are encouraged  Normal daily activities including climbing steps are okay  - Avoid lifting greater than 10 pounds, any strenuous activities and/or exercise, and contact sports until cleared by the trauma service  - Avoid driving and crowded places until cleared by the trauma service  - Continue using the incentive spirometer at least 10 times every hour while awake  Return to work:    - You may return to work once you are cleared by the trauma service  Medications:    - You should continue your current medication regimen after discharge unless otherwise instructed  Please refer to your discharge medication list for further details  - Please take the pain medications as directed  - You are encouraged to use non-narcotic pain medications first and whenever possible  Reserve the use of narcotic pain medication for moderate to severe pain not controlled by non-narcotic medications   - No driving while taking narcotic pain medications  - You may become constipated, especially if taking pain medications   You may take any over the counter stool softeners or laxatives as needed  Examples: Milk of Magnesia, Colace, Senna  Additional Instructions:  - Please have repeat chest x-ray in approximately 1 week (during the week of 03/14/2022) for re-evaluation of pneumothorax  Results to the trauma clinic at phone:  [phone] and fax:  [phone]  - If you have any questions or concerns after discharge please call the office   - Call office or return to ER if fever greater than 101, chills, worsening/uncontrollable pain, develop productive cough, increasing shortness of breath, and/or difficulty breathing

## 2022-03-08 NOTE — ASSESSMENT & PLAN NOTE
- Lumbar spine transverse process fracture, present on presentation   - Deferring any bracing secondary to multiple right-sided lower rib fractures  - Continue multimodal analgesic regimen   - No further workup or intervention necessary for this injury at this time  - Outpatient follow-up in [address] as noted

## 2022-03-08 NOTE — ASSESSMENT & PLAN NOTE
- Multiple right-sided rib fractures (10-12th), present on admission   - Continue rib fracture protocol   - Continue to encourage incentive spirometer use and adequate pulmonary hygiene  Currently pulling 1,250-1,500 mL on I S   - Continue multimodal analgesic regimen  Appreciate APS evaluation and recommendations  Continue epidural catheter  - Supplemental oxygen via nasal cannula as needed to maintain saturations greater than or equal to 94%  Currently on room air    - Repeat chest x-ray from 3/2/2022 reviewed showing small apical pneumothorax and small effusion  Repeat chest x-ray in the evening of 3/2/2022 reveals small stable right apical pneumothorax and effusion    - PT and OT evaluation and treatment as indicated  - Outpatient follow-up in the trauma clinic for re-evaluation in approximately 2 weeks  Statement Selected

## 2022-03-08 NOTE — CASE MANAGEMENT
Case Management Discharge Planning Note    Patient name Olivia Curiel  Location [address] [place] [address] [facility] [id number]  : 1955 Date 3/8/2022       Current Admission Date: 3/1/2022  Current Admission Diagnosis:Closed fracture of multiple ribs of right side   Patient Active Problem List    Diagnosis Date Noted    Bipolar depression ([facility] [address] ) 2022    Rheumatoid arthritis ([facility] [address] [facility] 2022    Closed fracture of multiple ribs of right side 2022    Traumatic pneumothorax 2022    Closed fracture of transverse process of lumbar vertebra ([facility] [address] ) 2022    Liver contusion 2022    Fall 2022    Acute pain due to trauma 2022    Right shoulder pain 2022    Severe protein-calorie malnutrition ([facility] [address] ) 2022    Undifferentiated connective tissue disease ([facility] ) 2021    Primary generalized (osteo)arthritis 2021    Peripheral neuropathy 2021    Senile osteoporosis 2021    Hx of compression fracture of spine 2021    Lumbar spondylosis 2021    Cervical spondylosis 2021    Anemia 2021    Chronic pain syndrome     Asthma     Ambulatory dysfunction     Acute metabolic encephalopathy     Acute cystitis without hematuria     Seronegative arthropathy of multiple sites ([facility] [address] [facility]     Gastroesophageal reflux disease without esophagitis     Mixed stress and urge urinary incontinence     CHF (congestive heart failure) (HCC)       LOS (days): 7  Geometric Mean LOS (GMLOS) (days): 5 20  Days to GMLOS:-1 2     OBJECTIVE:  Risk of Unplanned Readmission Score: 14         Current admission status: Inpatient   Preferred Pharmacy:   [id number], [address] [place] [address]  [address] [address]  Phone: [phone] Fax: [phone]    Primary Care Provider: Gorge Mcburney, DO    Primary Insurance: MEDICARE  Secondary Insurance: VI    DISCHARGE DETAILS:    Pt will d/c today to [facility] @1400   Pt will be transported via [employer]

## 2022-03-08 NOTE — ASSESSMENT & PLAN NOTE
- Status post mechanical fall with the below noted injuries  - Fall precautions  - Geriatric Medicine consultation for evaluation, medication review and recommendations   - PT and OT evaluation and treatment as indicated  - Case Management consultation for disposition planning

## 2022-03-08 NOTE — ASSESSMENT & PLAN NOTE
- Right shoulder pain, present on presentation, now resolved  - Right shoulder x-ray reviewed without evidence of acute fracture  - Continue activity as tolerated with weight-bearing as tolerated on the right upper extremity  - Continue multimodal analgesic regimen   - Continue PT and OT evaluation and treatment as indicated  - Outpatient follow-up with Orthopedic surgery as needed

## 2022-03-08 NOTE — ASSESSMENT & PLAN NOTE
- Suspected liver contusion noted on initial imaging including CT scan  - Patient's abdominal exam had remained benign throughout her hospital encounter   - Patient without evidence of active or ongoing bleeding  She has had a stable hemoglobin  - May continue diet as tolerated  - Continue activity restrictions in setting of associated rib fractures  - Continue PT and OT evaluation and treatment as indicated  - Continue multimodal analgesic regimen as indicated  - Outpatient follow-up in the trauma clinic for re-evaluation in 2 weeks

## 2022-03-08 NOTE — DISCHARGE SUMMARY
[facility]  Discharge- Lizzeth Wood 1955, 78 y o  female MRN: [id number]  Unit/Bed#: [facility] 620-01 Encounter: [id number]  Primary Care Provider: Brannon Aguiar DO   Date and time admitted to hospital: 3/1/2022 11:32 PM    Fall  Assessment & Plan  - Status post mechanical fall with the below noted injuries  - Fall precautions  - Geriatric Medicine consultation for evaluation, medication review and recommendations   - PT and OT evaluation and treatment as indicated  - Case Management consultation for disposition planning  * Closed fracture of multiple ribs of right side  Assessment & Plan  - Multiple right-sided rib fractures (10-12th), present on admission   - Continue rib fracture protocol   - Continue to encourage incentive spirometer use and adequate pulmonary hygiene  Currently pulling 1,250-1,500 mL on I S   - Continue multimodal analgesic regimen  Appreciate APS evaluation and recommendations  Continue epidural catheter  - Supplemental oxygen via nasal cannula as needed to maintain saturations greater than or equal to 94%  Currently on room air    - Repeat chest x-ray from 3/2/2022 reviewed showing small apical pneumothorax and small effusion  Repeat chest x-ray in the evening of 3/2/2022 reveals small stable right apical pneumothorax and effusion    - PT and OT evaluation and treatment as indicated  - Outpatient follow-up in the trauma clinic for re-evaluation in approximately 2 weeks  Traumatic pneumothorax  Assessment & Plan  - Right-sided pneumothorax, present on admission   - Management of rib fractures as noted  - Continue to encourage incentive spirometer use and adequate pulmonary hygiene  Currently pulling 1,250 - 1,500 mL on I S   - Supplemental oxygen via nasal cannula as needed  On room air, no supplemental oxygen required  - Repeat chest x-ray from 3/2/2022 reviewed     - Outpatient follow-up in the trauma clinic for re-evaluation in approximately 2 weeks  Liver contusion  Assessment & Plan  - Suspected liver contusion noted on initial imaging including CT scan  - Patient's abdominal exam had remained benign throughout her hospital encounter   - Patient without evidence of active or ongoing bleeding  She has had a stable hemoglobin  - May continue diet as tolerated  - Continue activity restrictions in setting of associated rib fractures  - Continue PT and OT evaluation and treatment as indicated  - Continue multimodal analgesic regimen as indicated  - Outpatient follow-up in the trauma clinic for re-evaluation in 2 weeks  Closed fracture of transverse process of lumbar vertebra [facility])  Assessment & Plan  - Lumbar spine transverse process fracture, present on presentation   - Deferring any bracing secondary to multiple right-sided lower rib fractures  - Continue multimodal analgesic regimen   - No further workup or intervention necessary for this injury at this time  - Outpatient follow-up in [address] as noted  Right shoulder pain  Assessment & Plan  - Right shoulder pain, present on presentation, now resolved  - Right shoulder x-ray reviewed without evidence of acute fracture  - Continue activity as tolerated with weight-bearing as tolerated on the right upper extremity  - Continue multimodal analgesic regimen   - Continue PT and OT evaluation and treatment as indicated  - Outpatient follow-up with Orthopedic surgery as needed  Acute pain due to trauma  Assessment & Plan  - Acute pain secondary to multiple traumatic injuries  - Appreciate APS evaluation, recommendations and interventions  - Patient had epidural catheter placed initially on 03/03/2022 and subsequently removed on 03/06/2022   - Patient subsequently transition to a multimodal oral analgesic regimen and maintains improved/adequate pain control at this time  - Continue bowel regimen while on opioid therapy      Severe protein-calorie malnutrition [facility])  Assessment & Plan  Malnutrition Findings:   Adult Malnutrition type: Acute illness  Adult Degree of Malnutrition: Other severe protein calorie malnutrition (as evidence by moderate clavicle muscle wasting, moderate temple muscle wasting, mild dorsal hand muscle wasting, severe buccal fat loss  Treated with oral diet and nutrition supplementation)    BMI Findings: Body mass index is 21 97 kg/m²  - Patient with severe protein calorie malnutrition   - Continue to encourage adequate oral intake  - Appreciate nutrition consultation and recommendations  - Continue with dietary supplements  - Outpatient follow-up with PCP  Bipolar depression ([place] [address] )  Assessment & Plan  - Continue home Wellbutrin and Lamictal   - Outpatient follow-up per routine  Rheumatoid arthritis ([place] [address] )  Assessment & Plan  - Continue home Plaquenil  - Outpatient follow-up per routine  Discharge Summary - Trauma Service   Rekha Frederick 78 y o  female MRN: [id number]  Unit/Bed#: [facility] 620-01 Encounter: [id number]    Admission Date: 3/1/2022     Discharge Date: 3/8/2022     Admitting Diagnosis: Rib fracture [S22 39XA]  Closed fracture of transverse process of lumbar vertebra, initial encounter ([place] [address] ) [S32 009A]    Discharge Diagnosis: See above  Attending and Service: Dr Elyse Ashley, Acute Care Surgical Services  Consulting Physician(s):     1  Acute Pain Service  Imaging and Procedures Performed:     XR chest pa & lateral    Result Date: 3/3/2022  Impression: Stable trace right apical pneumothorax  Unchanged small right pleural effusion  Workstation performed: [id number]     XR chest pa & lateral    Result Date: 3/2/2022  Impression: Small right apical pneumothorax  Small effusion  Critical result was discussed with Brandon Romero of the trauma service at 3:20 PM  Workstation performed: [id number]     XR shoulder 2+ vw right    Result Date: 3/2/2022  Impression: No evidence of shoulder fracture   Small apical pneumothorax Results were called to the trauma service  Please see chest x-ray report  Workstation performed: [id number]     CT head without contrast    Result Date: 3/1/2022  Impression: No acute intracranial abnormality  Workstation performed: [id number]     CT spine cervical without contrast    Result Date: 3/1/2022  Impression: No cervical spine fracture or traumatic malalignment  Right apical pneumothorax  Workstation performed: [id number]     CT chest abdomen pelvis w contrast    Result Date: 3/1/2022  Impression: Acute displaced right 10th-12th rib fractures with sharp rib ends extending into the pleural space on the right posteriorly  Right hydropneumothorax which may not reexpand with chest tube given the sharp bony fragments  Nondisplaced right L2 and L3 transverse process fractures  Hypodensity in the liver adjacent to one of the rib fractures could represent small contusion or cyst   I personally discussed this study with Kalya Sams on 3/1/2022 at 9:44 PM   Trauma team will be consulted  Workstation performed: [id number]       Hospital Course: Yudi Sprague is a 58-year-old female who [facility] [place] [address] following a fall  She notes she fell backwards and landed on to a sharp edge of concrete  She was found to have multiple right-sided rib fractures and transferred to [facility] for trauma evaluation  On arrival to [facility], she complained of right chest wall and right upper abdominal pain  On her initial trauma evaluation at [facility], her primary survey was unremarkable  On secondary survey, she was hypertensive with normal vital signs otherwise; she had somewhat decreased respiratory effort and tenderness across her right chest wall; her abdomen was tender in the right upper quadrant without evidence of peritonitis and was otherwise soft; the remainder of her exam was unremarkable    Her initial workup including labs in the above-noted imaging studies was reviewed by the trauma service at [facility]  She was admitted to the trauma service at [facility] following a fall with multiple right-sided rib fractures, a small right-sided pneumothorax, a liver contusion, lumbar spine transverse process fracture acute pain secondary to traumatic injuries, severe protein calorie malnutrition and medical comorbidities  She was started on a multimodal analgesic regimen with acute Pain Service consult to assess for additional modalities  She ultimately had an epidural catheter placed for assistance with pain control from 03/03/2022 through 03/06/2022  Bracing for her lumbar spine transverse process fractures was deferred secondary to her right lower chest wall injuries  In regards to her liver contusion, her abdominal exam remained benign throughout her hospital encounter and she required no further workup or intervention necessary for this injury  Her small right pneumothorax did not requiring intervention and remained stable on repeat imaging with the patient remaining asymptomatic  She did have right shoulder pain during her hospital encounter, but x-ray did not demonstrate any evidence of fracture and the patient's pain improved during her hospital stay  PT and OT evaluated her and recommended rehab  Case Management assisted with disposition planning  The patient was deemed stable for discharge on 03/08/2022  For further details of her hospital encounter, please see her complete medical records  On discharge, the patient is instructed to follow-up with the patient's primary care provider to review the events of the patient's recent hospitalization  The patient is instructed to follow-up in the Trauma Clinic as scheduled on 3/24/2022 at 12:45 PM   The patient should follow the provided discharge instructions      Condition at Discharge: good     Discharge instructions/Information to patient and family: See after visit summary for information provided to patient and family  Provisions for Follow-Up Care:  See after visit summary for information related to follow-up care and any pertinent home health orders  Disposition: See After Visit Summary for discharge disposition information  Planned Readmission: No    Discharge Statement   I spent 30 minutes discharging the patient  This time was spent on the day of discharge  I had direct contact with the patient on the day of discharge  Additional documentation is required if more than 30 minutes were spent on discharge  Discharge Medications:  See after visit summary for reconciled discharge medications provided to patient and family        Evette Shoemaker PA-C  3/8/2022  11:02 AM

## 2022-03-08 NOTE — ASSESSMENT & PLAN NOTE
Malnutrition Findings:   Adult Malnutrition type: Acute illness  Adult Degree of Malnutrition: Other severe protein calorie malnutrition (as evidence by moderate clavicle muscle wasting, moderate temple muscle wasting, mild dorsal hand muscle wasting, severe buccal fat loss  Treated with oral diet and nutrition supplementation)    BMI Findings: Body mass index is 21 97 kg/m²  - Patient with severe protein calorie malnutrition   - Continue to encourage adequate oral intake  - Appreciate nutrition consultation and recommendations  - Continue with dietary supplements  - Outpatient follow-up with PCP

## 2022-03-08 NOTE — ASSESSMENT & PLAN NOTE
- Right-sided pneumothorax, present on admission   - Management of rib fractures as noted  - Continue to encourage incentive spirometer use and adequate pulmonary hygiene  Currently pulling 1,250 - 1,500 mL on I S   - Supplemental oxygen via nasal cannula as needed  On room air, no supplemental oxygen required  - Repeat chest x-ray from 3/2/2022 reviewed  - Outpatient follow-up in the trauma clinic for re-evaluation in approximately 2 weeks

## 2022-03-29 ENCOUNTER — DOCUMENTATION (OUTPATIENT)
Dept: SOCIAL WORK | Facility: HOSPITAL | Age: 67
End: 2022-03-29

## 2022-03-29 NOTE — PROGRESS NOTES
Notification of Assess not [facility]  [facility] has assessed your patient for Home Health services and has determined the patient is not eligible for service due to the following    DC pt from skilled home PT and skilled home OT services  Pt is independent with Transfer Training  Gait Training with use of RW toilet and walk in shower transfers  Pt is independent with bathing with use of shower chair and independent with dressing  Pt has local family that currently A with grocery shopping  Pt able to prepare meal complete light household chores independently  Pt is independent with medication management and currently has no questions and or concerns about medication regimen  Pt requested a referral for outpt PT at this time and contacted pts PCP Dr Aaliyah Shay  No further skilled home PT and skilled home OT needs at this time      Earney Reason, PT

## 2022-04-07 ENCOUNTER — OFFICE VISIT (OUTPATIENT)
Dept: SURGERY | Facility: CLINIC | Age: 67
End: 2022-04-07
Payer: MEDICARE

## 2022-04-07 ENCOUNTER — HOSPITAL ENCOUNTER (OUTPATIENT)
Dept: RADIOLOGY | Facility: HOSPITAL | Age: 67
Discharge: HOME | End: 2022-04-07
Payer: MEDICARE

## 2022-04-07 VITALS — HEIGHT: 64 IN | BODY MASS INDEX: 23.05 KG/M2 | WEIGHT: 135 LBS | TEMPERATURE: 97.4 F

## 2022-04-07 DIAGNOSIS — S22.49XA RIB FRACTURES: Primary | ICD-10-CM

## 2022-04-07 DIAGNOSIS — S27.0XXA TRAUMATIC PNEUMOTHORAX: ICD-10-CM

## 2022-04-07 DIAGNOSIS — S22.41XA CLOSED FRACTURE OF MULTIPLE RIBS OF RIGHT SIDE, INITIAL ENCOUNTER: ICD-10-CM

## 2022-04-07 DIAGNOSIS — S36.112A: ICD-10-CM

## 2022-04-07 DIAGNOSIS — S22.41XA CLOSED FRACTURE OF MULTIPLE RIBS OF RIGHT SIDE: ICD-10-CM

## 2022-04-07 DIAGNOSIS — S32.009A CLOSED FRACTURE OF TRANSVERSE PROCESS OF LUMBAR VERTEBRA (HCC): ICD-10-CM

## 2022-04-07 PROCEDURE — 99214 OFFICE O/P EST MOD 30 MIN: CPT | Performed by: SURGERY

## 2022-04-07 PROCEDURE — 71046 X-RAY EXAM CHEST 2 VIEWS: CPT

## 2022-04-07 RX ORDER — CYCLOBENZAPRINE HCL 5 MG
5 TABLET ORAL 3 TIMES DAILY PRN
Qty: 45 TABLET | Refills: 0 | Status: SHIPPED | OUTPATIENT
Start: 2022-04-07 | End: 2022-04-26 | Stop reason: HOSPADM

## 2022-04-07 RX ORDER — NALOXONE HYDROCHLORIDE 4 MG/.1ML
SPRAY NASAL
Qty: 1 EACH | Refills: 1 | Status: SHIPPED | OUTPATIENT
Start: 2022-04-07

## 2022-04-07 RX ORDER — OXYCODONE HYDROCHLORIDE 5 MG/1
TABLET ORAL
Qty: 20 TABLET | Refills: 0 | Status: SHIPPED | OUTPATIENT
Start: 2022-04-07

## 2022-04-07 RX ORDER — GABAPENTIN 300 MG/1
300 CAPSULE ORAL 2 TIMES DAILY
Qty: 40 CAPSULE | Refills: 0 | Status: SHIPPED | OUTPATIENT
Start: 2022-04-07

## 2022-04-07 NOTE — ASSESSMENT & PLAN NOTE
- obtain f/u CXR to evaluate for resolution of PTX  - asymptomatic  - trauma will f/u results of CXR  - encouraged use of IS

## 2022-04-07 NOTE — ASSESSMENT & PLAN NOTE
- Right 10-12th rib fractures  - Continued to have R rib pain: recommend continued scheduled tylenol, start flexeril 5 mg TID and d/c methocarbamol (due to GI intolerance), increase gabapentin to 300 mg BID and continue to wean oxycodone to 2 5 mg Q8h and then decrease frequency over the next week to two  Continue chronic MSIR 15 mg Q12h for chronic pain  - f/u with trauma in 3 weeks for re-evaluation

## 2022-04-22 ENCOUNTER — APPOINTMENT (EMERGENCY)
Dept: CT IMAGING | Facility: HOSPITAL | Age: 67
DRG: 682 | End: 2022-04-22
Payer: MEDICARE

## 2022-04-22 ENCOUNTER — APPOINTMENT (EMERGENCY)
Dept: RADIOLOGY | Facility: HOSPITAL | Age: 67
DRG: 682 | End: 2022-04-22
Payer: MEDICARE

## 2022-04-22 ENCOUNTER — HOSPITAL ENCOUNTER (INPATIENT)
Facility: HOSPITAL | Age: 67
LOS: 3 days | Discharge: NURSING, REHAB OR LONG-TERM CARE | DRG: 682 | End: 2022-04-26
Attending: EMERGENCY MEDICINE | Admitting: INTERNAL MEDICINE
Payer: MEDICARE

## 2022-04-22 DIAGNOSIS — Z01.89 ENCOUNTER FOR COMPETENCY EVALUATION: ICD-10-CM

## 2022-04-22 DIAGNOSIS — G93.41 ACUTE METABOLIC ENCEPHALOPATHY: ICD-10-CM

## 2022-04-22 DIAGNOSIS — F31.9 BIPOLAR 1 DISORDER (HCC): ICD-10-CM

## 2022-04-22 DIAGNOSIS — Z79.899 POLYPHARMACY: ICD-10-CM

## 2022-04-22 DIAGNOSIS — J96.01 ACUTE RESPIRATORY FAILURE WITH HYPOXIA (HCC): ICD-10-CM

## 2022-04-22 DIAGNOSIS — N17.9 AKI (ACUTE KIDNEY INJURY) (HCC): Primary | ICD-10-CM

## 2022-04-22 LAB
ALBUMIN SERPL BCP-MCNC: 3.8 G/DL (ref 3.5–5)
ALP SERPL-CCNC: 95 U/L (ref 46–116)
ALT SERPL W P-5'-P-CCNC: 19 U/L (ref 12–78)
ANION GAP SERPL CALCULATED.3IONS-SCNC: 11 MMOL/L (ref 4–13)
APAP SERPL-MCNC: <2 UG/ML (ref 10–20)
APTT PPP: 28 SECONDS (ref 23–37)
AST SERPL W P-5'-P-CCNC: 24 U/L (ref 5–45)
ATRIAL RATE: 98 BPM
BASOPHILS # BLD AUTO: 0.04 THOUSANDS/ΜL (ref 0–0.1)
BASOPHILS NFR BLD AUTO: 1 % (ref 0–1)
BILIRUB SERPL-MCNC: 0.62 MG/DL (ref 0.2–1)
BILIRUB UR QL STRIP: NEGATIVE
BUN SERPL-MCNC: 35 MG/DL (ref 5–25)
CALCIUM SERPL-MCNC: 9.7 MG/DL (ref 8.3–10.1)
CHLORIDE SERPL-SCNC: 103 MMOL/L (ref 100–108)
CK MB SERPL-MCNC: 2.8 % (ref 0–2.5)
CK MB SERPL-MCNC: 6.5 NG/ML (ref 0–5)
CK SERPL-CCNC: 229 U/L (ref 26–192)
CLARITY UR: CLEAR
CO2 SERPL-SCNC: 25 MMOL/L (ref 21–32)
COLOR UR: YELLOW
CREAT SERPL-MCNC: 2.68 MG/DL (ref 0.6–1.3)
EOSINOPHIL # BLD AUTO: 0.08 THOUSAND/ΜL (ref 0–0.61)
EOSINOPHIL NFR BLD AUTO: 1 % (ref 0–6)
ERYTHROCYTE [DISTWIDTH] IN BLOOD BY AUTOMATED COUNT: 13 % (ref 11.6–15.1)
ETHANOL SERPL-MCNC: 3 MG/DL (ref 0–3)
GFR SERPL CREATININE-BSD FRML MDRD: 17 ML/MIN/1.73SQ M
GLUCOSE SERPL-MCNC: 67 MG/DL (ref 65–140)
GLUCOSE UR STRIP-MCNC: NEGATIVE MG/DL
HCT VFR BLD AUTO: 36.8 % (ref 34.8–46.1)
HGB BLD-MCNC: 11.8 G/DL (ref 11.5–15.4)
HGB UR QL STRIP.AUTO: NEGATIVE
IMM GRANULOCYTES # BLD AUTO: 0.04 THOUSAND/UL (ref 0–0.2)
IMM GRANULOCYTES NFR BLD AUTO: 1 % (ref 0–2)
INR PPP: 1.12 (ref 0.84–1.19)
KETONES UR STRIP-MCNC: NEGATIVE MG/DL
LACTATE SERPL-SCNC: 1.8 MMOL/L (ref 0.5–2)
LEUKOCYTE ESTERASE UR QL STRIP: NEGATIVE
LYMPHOCYTES # BLD AUTO: 1.31 THOUSANDS/ΜL (ref 0.6–4.47)
LYMPHOCYTES NFR BLD AUTO: 17 % (ref 14–44)
MCH RBC QN AUTO: 30.8 PG (ref 26.8–34.3)
MCHC RBC AUTO-ENTMCNC: 32.1 G/DL (ref 31.4–37.4)
MCV RBC AUTO: 96 FL (ref 82–98)
MONOCYTES # BLD AUTO: 0.71 THOUSAND/ΜL (ref 0.17–1.22)
MONOCYTES NFR BLD AUTO: 9 % (ref 4–12)
NEUTROPHILS # BLD AUTO: 5.55 THOUSANDS/ΜL (ref 1.85–7.62)
NEUTS SEG NFR BLD AUTO: 71 % (ref 43–75)
NITRITE UR QL STRIP: NEGATIVE
NRBC BLD AUTO-RTO: 0 /100 WBCS
PH UR STRIP.AUTO: 5.5 [PH]
PLATELET # BLD AUTO: 187 THOUSANDS/UL (ref 149–390)
PMV BLD AUTO: 10.5 FL (ref 8.9–12.7)
POTASSIUM SERPL-SCNC: 4.5 MMOL/L (ref 3.5–5.3)
PROCALCITONIN SERPL-MCNC: 0.28 NG/ML
PROT SERPL-MCNC: 6.6 G/DL (ref 6.4–8.2)
PROT UR STRIP-MCNC: NEGATIVE MG/DL
PROTHROMBIN TIME: 14 SECONDS (ref 11.6–14.5)
QRS AXIS: 31 DEGREES
QRSD INTERVAL: 94 MS
QT INTERVAL: 344 MS
QTC INTERVAL: 404 MS
RBC # BLD AUTO: 3.83 MILLION/UL (ref 3.81–5.12)
SALICYLATES SERPL-MCNC: <3 MG/DL (ref 3–20)
SODIUM SERPL-SCNC: 139 MMOL/L (ref 136–145)
SP GR UR STRIP.AUTO: >=1.03 (ref 1–1.03)
T WAVE AXIS: 6 DEGREES
UROBILINOGEN UR QL STRIP.AUTO: 0.2 E.U./DL
VENTRICULAR RATE: 83 BPM
WBC # BLD AUTO: 7.73 THOUSAND/UL (ref 4.31–10.16)

## 2022-04-22 PROCEDURE — 80143 DRUG ASSAY ACETAMINOPHEN: CPT | Performed by: EMERGENCY MEDICINE

## 2022-04-22 PROCEDURE — 85730 THROMBOPLASTIN TIME PARTIAL: CPT | Performed by: EMERGENCY MEDICINE

## 2022-04-22 PROCEDURE — 85025 COMPLETE CBC W/AUTO DIFF WBC: CPT | Performed by: EMERGENCY MEDICINE

## 2022-04-22 PROCEDURE — 70450 CT HEAD/BRAIN W/O DYE: CPT

## 2022-04-22 PROCEDURE — 84145 PROCALCITONIN (PCT): CPT | Performed by: EMERGENCY MEDICINE

## 2022-04-22 PROCEDURE — 83605 ASSAY OF LACTIC ACID: CPT | Performed by: EMERGENCY MEDICINE

## 2022-04-22 PROCEDURE — 87040 BLOOD CULTURE FOR BACTERIA: CPT | Performed by: EMERGENCY MEDICINE

## 2022-04-22 PROCEDURE — 85610 PROTHROMBIN TIME: CPT | Performed by: EMERGENCY MEDICINE

## 2022-04-22 PROCEDURE — G1004 CDSM NDSC: HCPCS

## 2022-04-22 PROCEDURE — 82077 ASSAY SPEC XCP UR&BREATH IA: CPT | Performed by: EMERGENCY MEDICINE

## 2022-04-22 PROCEDURE — 93010 ELECTROCARDIOGRAM REPORT: CPT | Performed by: INTERNAL MEDICINE

## 2022-04-22 PROCEDURE — 96365 THER/PROPH/DIAG IV INF INIT: CPT

## 2022-04-22 PROCEDURE — 73502 X-RAY EXAM HIP UNI 2-3 VIEWS: CPT

## 2022-04-22 PROCEDURE — 96366 THER/PROPH/DIAG IV INF ADDON: CPT

## 2022-04-22 PROCEDURE — 82550 ASSAY OF CK (CPK): CPT | Performed by: EMERGENCY MEDICINE

## 2022-04-22 PROCEDURE — 74176 CT ABD & PELVIS W/O CONTRAST: CPT

## 2022-04-22 PROCEDURE — 93005 ELECTROCARDIOGRAM TRACING: CPT

## 2022-04-22 PROCEDURE — 80179 DRUG ASSAY SALICYLATE: CPT | Performed by: EMERGENCY MEDICINE

## 2022-04-22 PROCEDURE — 36415 COLL VENOUS BLD VENIPUNCTURE: CPT | Performed by: EMERGENCY MEDICINE

## 2022-04-22 PROCEDURE — 71045 X-RAY EXAM CHEST 1 VIEW: CPT

## 2022-04-22 PROCEDURE — 99285 EMERGENCY DEPT VISIT HI MDM: CPT

## 2022-04-22 PROCEDURE — 82553 CREATINE MB FRACTION: CPT | Performed by: EMERGENCY MEDICINE

## 2022-04-22 PROCEDURE — 80053 COMPREHEN METABOLIC PANEL: CPT | Performed by: EMERGENCY MEDICINE

## 2022-04-22 PROCEDURE — 99291 CRITICAL CARE FIRST HOUR: CPT | Performed by: EMERGENCY MEDICINE

## 2022-04-22 PROCEDURE — 72125 CT NECK SPINE W/O DYE: CPT

## 2022-04-22 PROCEDURE — 81003 URINALYSIS AUTO W/O SCOPE: CPT | Performed by: EMERGENCY MEDICINE

## 2022-04-22 RX ADMIN — SODIUM CHLORIDE, SODIUM LACTATE, POTASSIUM CHLORIDE, AND CALCIUM CHLORIDE 2000 ML: .6; .31; .03; .02 INJECTION, SOLUTION INTRAVENOUS at 22:08

## 2022-04-23 ENCOUNTER — APPOINTMENT (INPATIENT)
Dept: NON INVASIVE DIAGNOSTICS | Facility: HOSPITAL | Age: 67
DRG: 682 | End: 2022-04-23
Payer: MEDICARE

## 2022-04-23 PROBLEM — N17.9 AKI (ACUTE KIDNEY INJURY) (HCC): Status: ACTIVE | Noted: 2022-04-23

## 2022-04-23 PROBLEM — Z79.899 POLYPHARMACY: Status: ACTIVE | Noted: 2022-04-23

## 2022-04-23 LAB
AMMONIA PLAS-SCNC: <10 UMOL/L (ref 11–35)
ANION GAP SERPL CALCULATED.3IONS-SCNC: 12 MMOL/L (ref 4–13)
AORTIC ROOT: 3.2 CM
APICAL FOUR CHAMBER EJECTION FRACTION: 56 %
ASCENDING AORTA: 3.6 CM (ref 1.87–2.81)
AV REGURGITATION PRESSURE HALF TIME: 362 MS
BASE EX.OXY STD BLDV CALC-SCNC: 37 % (ref 60–80)
BASE EX.OXY STD BLDV CALC-SCNC: 80.8 % (ref 60–80)
BASE EXCESS BLDV CALC-SCNC: -5.7 MMOL/L
BASE EXCESS BLDV CALC-SCNC: -6.4 MMOL/L
BUN SERPL-MCNC: 29 MG/DL (ref 5–25)
CALCIUM SERPL-MCNC: 9.3 MG/DL (ref 8.3–10.1)
CHLORIDE SERPL-SCNC: 107 MMOL/L (ref 100–108)
CO2 SERPL-SCNC: 21 MMOL/L (ref 21–32)
CREAT SERPL-MCNC: 1.74 MG/DL (ref 0.6–1.3)
E WAVE DECELERATION TIME: 134 MS
FRACTIONAL SHORTENING: 29 % (ref 28–44)
GFR SERPL CREATININE-BSD FRML MDRD: 29 ML/MIN/1.73SQ M
GLUCOSE SERPL-MCNC: 77 MG/DL (ref 65–140)
GLUCOSE SERPL-MCNC: 77 MG/DL (ref 65–140)
HCO3 BLDV-SCNC: 19.5 MMOL/L (ref 24–30)
HCO3 BLDV-SCNC: 24 MMOL/L (ref 24–30)
INTERVENTRICULAR SEPTUM IN DIASTOLE (PARASTERNAL SHORT AXIS VIEW): 1 CM
INTERVENTRICULAR SEPTUM: 1 CM (ref 0.5–0.94)
LAAS-AP2: 21.4 CM2
LAAS-AP4: 21.4 CM2
LEFT ATRIUM SIZE: 4.1 CM
LEFT INTERNAL DIMENSION IN SYSTOLE: 3 CM (ref 2.39–3.62)
LEFT VENTRICULAR INTERNAL DIMENSION IN DIASTOLE: 4.2 CM (ref 3.89–5.79)
LEFT VENTRICULAR POSTERIOR WALL IN END DIASTOLE: 1 CM (ref 0.49–0.92)
LEFT VENTRICULAR STROKE VOLUME: 42 ML
LVSV (TEICH): 42 ML
MV E'TISSUE VEL-SEP: 8 CM/S
MV PEAK A VEL: 0.85 M/S
MV PEAK E VEL: 85 CM/S
MV STENOSIS PRESSURE HALF TIME: 39 MS
MV VALVE AREA P 1/2 METHOD: 5.64 CM2
O2 CT BLDV-SCNC: 12.5 ML/DL
O2 CT BLDV-SCNC: 5.8 ML/DL
PCO2 BLDV: 37.2 MM HG (ref 42–50)
PCO2 BLDV: 77.1 MM HG (ref 42–50)
PH BLDV: 7.11 [PH] (ref 7.3–7.4)
PH BLDV: 7.34 [PH] (ref 7.3–7.4)
PO2 BLDV: 21.1 MM HG (ref 35–45)
PO2 BLDV: 55.5 MM HG (ref 35–45)
POTASSIUM SERPL-SCNC: 3.9 MMOL/L (ref 3.5–5.3)
RIGHT ATRIAL 2D VOLUME: 31 ML
RIGHT ATRIUM AREA SYSTOLE A4C: 13.9 CM2
RIGHT VENTRICLE ID DIMENSION: 3.5 CM
SL CV AV DECELERATION TIME RETROGRADE: 1249 MS
SL CV AV PEAK GRADIENT RETROGRADE: 55 MMHG
SL CV LEFT ATRIUM LENGTH A2C: 5.7 CM
SL CV LV EF: 63
SL CV PED ECHO LEFT VENTRICLE DIASTOLIC VOLUME (MOD BIPLANE) 2D: 77 ML
SL CV PED ECHO LEFT VENTRICLE SYSTOLIC VOLUME (MOD BIPLANE) 2D: 35 ML
SODIUM SERPL-SCNC: 140 MMOL/L (ref 136–145)
Z-SCORE OF ASCENDING AORTA: 5.35
Z-SCORE OF INTERVENTRICULAR SEPTUM IN END DIASTOLE: 2.52
Z-SCORE OF LEFT VENTRICULAR DIMENSION IN END DIASTOLE: -1.23
Z-SCORE OF LEFT VENTRICULAR DIMENSION IN END SYSTOLE: 0.16
Z-SCORE OF LEFT VENTRICULAR POSTERIOR WALL IN END DIASTOLE: 2.63

## 2022-04-23 PROCEDURE — 93306 TTE W/DOPPLER COMPLETE: CPT

## 2022-04-23 PROCEDURE — 82948 REAGENT STRIP/BLOOD GLUCOSE: CPT

## 2022-04-23 PROCEDURE — 82805 BLOOD GASES W/O2 SATURATION: CPT | Performed by: PHYSICIAN ASSISTANT

## 2022-04-23 PROCEDURE — 36415 COLL VENOUS BLD VENIPUNCTURE: CPT | Performed by: EMERGENCY MEDICINE

## 2022-04-23 PROCEDURE — 82140 ASSAY OF AMMONIA: CPT | Performed by: EMERGENCY MEDICINE

## 2022-04-23 PROCEDURE — 82805 BLOOD GASES W/O2 SATURATION: CPT | Performed by: EMERGENCY MEDICINE

## 2022-04-23 PROCEDURE — 80048 BASIC METABOLIC PNL TOTAL CA: CPT | Performed by: PHYSICIAN ASSISTANT

## 2022-04-23 PROCEDURE — 93306 TTE W/DOPPLER COMPLETE: CPT | Performed by: INTERNAL MEDICINE

## 2022-04-23 PROCEDURE — G0425 INPT/ED TELECONSULT30: HCPCS | Performed by: PSYCHIATRY & NEUROLOGY

## 2022-04-23 PROCEDURE — 99223 1ST HOSP IP/OBS HIGH 75: CPT | Performed by: INTERNAL MEDICINE

## 2022-04-23 RX ORDER — NALOXONE HYDROCHLORIDE 1 MG/ML
2 INJECTION INTRAMUSCULAR; INTRAVENOUS; SUBCUTANEOUS ONCE
Status: COMPLETED | OUTPATIENT
Start: 2022-04-23 | End: 2022-04-23

## 2022-04-23 RX ORDER — PANTOPRAZOLE SODIUM 40 MG/1
40 TABLET, DELAYED RELEASE ORAL
Status: DISCONTINUED | OUTPATIENT
Start: 2022-04-23 | End: 2022-04-26 | Stop reason: HOSPADM

## 2022-04-23 RX ORDER — ACETAMINOPHEN 325 MG/1
650 TABLET ORAL EVERY 6 HOURS PRN
Status: DISCONTINUED | OUTPATIENT
Start: 2022-04-23 | End: 2022-04-26 | Stop reason: HOSPADM

## 2022-04-23 RX ORDER — HEPARIN SODIUM 5000 [USP'U]/ML
5000 INJECTION, SOLUTION INTRAVENOUS; SUBCUTANEOUS EVERY 8 HOURS SCHEDULED
Status: DISCONTINUED | OUTPATIENT
Start: 2022-04-23 | End: 2022-04-26 | Stop reason: HOSPADM

## 2022-04-23 RX ORDER — BUPROPION HYDROCHLORIDE 100 MG/1
100 TABLET ORAL 2 TIMES DAILY
Status: DISCONTINUED | OUTPATIENT
Start: 2022-04-23 | End: 2022-04-23

## 2022-04-23 RX ORDER — ONDANSETRON 2 MG/ML
4 INJECTION INTRAMUSCULAR; INTRAVENOUS EVERY 6 HOURS PRN
Status: DISCONTINUED | OUTPATIENT
Start: 2022-04-23 | End: 2022-04-26 | Stop reason: HOSPADM

## 2022-04-23 RX ORDER — LORAZEPAM 2 MG/ML
0.5 INJECTION INTRAMUSCULAR EVERY 6 HOURS PRN
Status: DISCONTINUED | OUTPATIENT
Start: 2022-04-23 | End: 2022-04-26 | Stop reason: HOSPADM

## 2022-04-23 RX ORDER — OXYCODONE HYDROCHLORIDE 5 MG/1
5 TABLET ORAL EVERY 4 HOURS PRN
Status: DISCONTINUED | OUTPATIENT
Start: 2022-04-23 | End: 2022-04-26 | Stop reason: HOSPADM

## 2022-04-23 RX ORDER — HYDROXYCHLOROQUINE SULFATE 200 MG/1
200 TABLET, FILM COATED ORAL 2 TIMES DAILY
Status: DISCONTINUED | OUTPATIENT
Start: 2022-04-23 | End: 2022-04-26 | Stop reason: HOSPADM

## 2022-04-23 RX ORDER — NALOXONE HYDROCHLORIDE 1 MG/ML
INJECTION INTRAMUSCULAR; INTRAVENOUS; SUBCUTANEOUS
Status: COMPLETED
Start: 2022-04-23 | End: 2022-04-23

## 2022-04-23 RX ORDER — SODIUM CHLORIDE 9 MG/ML
75 INJECTION, SOLUTION INTRAVENOUS CONTINUOUS
Status: DISCONTINUED | OUTPATIENT
Start: 2022-04-23 | End: 2022-04-24

## 2022-04-23 RX ORDER — LAMOTRIGINE 100 MG/1
150 TABLET ORAL DAILY
Status: DISCONTINUED | OUTPATIENT
Start: 2022-04-23 | End: 2022-04-26 | Stop reason: HOSPADM

## 2022-04-23 RX ORDER — GABAPENTIN 300 MG/1
300 CAPSULE ORAL 2 TIMES DAILY
Status: DISCONTINUED | OUTPATIENT
Start: 2022-04-23 | End: 2022-04-23

## 2022-04-23 RX ORDER — BUPROPION HYDROCHLORIDE 150 MG/1
450 TABLET ORAL DAILY
Status: DISCONTINUED | OUTPATIENT
Start: 2022-04-23 | End: 2022-04-26 | Stop reason: HOSPADM

## 2022-04-23 RX ADMIN — SODIUM CHLORIDE 75 ML/HR: 0.9 INJECTION, SOLUTION INTRAVENOUS at 04:58

## 2022-04-23 RX ADMIN — HEPARIN SODIUM 5000 UNITS: 5000 INJECTION INTRAVENOUS; SUBCUTANEOUS at 22:07

## 2022-04-23 RX ADMIN — PANTOPRAZOLE SODIUM 40 MG: 40 TABLET, DELAYED RELEASE ORAL at 05:08

## 2022-04-23 RX ADMIN — LORAZEPAM 0.5 MG: 2 INJECTION INTRAMUSCULAR; INTRAVENOUS at 06:10

## 2022-04-23 RX ADMIN — HEPARIN SODIUM 5000 UNITS: 5000 INJECTION INTRAVENOUS; SUBCUTANEOUS at 05:08

## 2022-04-23 RX ADMIN — NALOXONE HYDROCHLORIDE 2 MG: 1 INJECTION PARENTERAL at 00:49

## 2022-04-23 RX ADMIN — HYDROXYCHLOROQUINE SULFATE 200 MG: 200 TABLET, FILM COATED ORAL at 11:27

## 2022-04-23 RX ADMIN — HEPARIN SODIUM 5000 UNITS: 5000 INJECTION INTRAVENOUS; SUBCUTANEOUS at 14:59

## 2022-04-23 RX ADMIN — NALOXONE HYDROCHLORIDE 2 MG: 1 INJECTION INTRAMUSCULAR; INTRAVENOUS; SUBCUTANEOUS at 00:49

## 2022-04-23 RX ADMIN — LAMOTRIGINE 150 MG: 100 TABLET ORAL at 08:24

## 2022-04-23 RX ADMIN — OXYCODONE HYDROCHLORIDE 5 MG: 5 TABLET ORAL at 17:37

## 2022-04-23 RX ADMIN — OXYCODONE HYDROCHLORIDE 5 MG: 5 TABLET ORAL at 22:07

## 2022-04-23 RX ADMIN — ACETAMINOPHEN 325MG 650 MG: 325 TABLET ORAL at 05:13

## 2022-04-23 RX ADMIN — ACETAMINOPHEN 325MG 650 MG: 325 TABLET ORAL at 12:11

## 2022-04-23 RX ADMIN — HYDROXYCHLOROQUINE SULFATE 200 MG: 200 TABLET, FILM COATED ORAL at 17:34

## 2022-04-23 RX ADMIN — BUPROPION HYDROCHLORIDE 450 MG: 150 TABLET, FILM COATED, EXTENDED RELEASE ORAL at 08:24

## 2022-04-23 NOTE — PLAN OF CARE
Problem: Potential for Falls  Goal: Patient will remain free of falls  Description: INTERVENTIONS:  - Educate patient/family on patient safety including physical limitations  - Instruct patient to call for assistance with activity   - Consult OT/PT to assist with strengthening/mobility   - Keep Call bell within reach  - Keep bed low and locked with side rails adjusted as appropriate  - Keep care items and personal belongings within reach  - Initiate and maintain comfort rounds  - Make Fall Risk Sign visible to staff  - Initiate/Maintain bed alarm  - Obtain necessary fall risk management equipment  Problem: MOBILITY - ADULT  Goal: Maintain or return to baseline ADL function  Description: INTERVENTIONS:  -  Assess patient's ability to carry out ADLs; assess patient's baseline for ADL function and identify physical deficits which impact ability to perform ADLs (bathing, care of mouth/teeth, toileting, grooming, dressing, etc )  - Assess/evaluate cause of self-care deficits   - Assess range of motion  - Assess patient's mobility; develop plan if impaired  - Assess patient's need for assistive devices and provide as appropriate  - Encourage maximum independence but intervene and supervise when necessary  - Involve family in performance of ADLs  - Assess for home care needs following discharge   - Consider OT consult to assist with ADL evaluation and planning for discharge  - Provide patient education as appropriate  Outcome: Progressing  Goal: Maintains/Returns to pre admission functional level  Description: INTERVENTIONS:  - Perform BMAT or MOVE assessment daily    - Set and communicate daily mobility goal to care team and patient/family/caregiver  - Collaborate with rehabilitation services on mobility goals if consulted  - Perform Range of Motion 4 times a day    - Dangle patient 3 times a day  - Stand patient 3 times a day  - Ambulate patient 3 times a day  - Out of bed to chair 3 times a day   - Out of bed for meals 3  Problem: PAIN - ADULT  Goal: Verbalizes/displays adequate comfort level or baseline comfort level  Description: Interventions:  - Encourage patient to monitor pain and request assistance  - Assess pain using appropriate pain scale  - Administer analgesics based on type and severity of pain and evaluate response  - Implement non-pharmacological measures as appropriate and evaluate response  - Consider cultural and social influences on pain and pain management  - Notify physician/advanced practitioner if interventions unsuccessful or patient reports new pain  Outcome: Progressing     Problem: INFECTION - ADULT  Goal: Absence or prevention of progression during hospitalization  Description: INTERVENTIONS:  - Assess and monitor for signs and symptoms of infection  - Monitor lab/diagnostic results  - Monitor all insertion sites, i e  indwelling lines, tubes, and drains  - Monitor endotracheal if appropriate and nasal secretions for changes in amount and color  - [place] appropriate cooling/warming therapies per order  - Administer medications as ordered  - Instruct and encourage patient and family to use good hand hygiene technique  - Identify and instruct in appropriate isolation precautions for identified infection/condition  Outcome: Progressing     Problem: DISCHARGE PLANNING  Goal: Discharge to home or other facility with appropriate resources  Description: INTERVENTIONS:  - Identify barriers to discharge w/patient and caregiver  - Arrange for needed discharge resources and transportation as appropriate  - Identify discharge learning needs (meds, wound care, etc )  - Arrange for interpretive services to assist at discharge as needed  - Refer to Case Management Department for coordinating discharge planning if the patient needs post-hospital services based on physician/advanced practitioner order or complex needs related to functional status, cognitive ability, or social support system  Outcome: Progressing     Problem: Knowledge Deficit  Goal: Patient/family/caregiver demonstrates understanding of disease process, treatment plan, medications, and discharge instructions  Description: Complete learning assessment and assess knowledge base  Interventions:  - Provide teaching at level of understanding  - Provide teaching via preferred learning methods  Outcome: Progressing     Problem: NEUROSENSORY - ADULT  Goal: Achieves stable or improved neurological status  Description: INTERVENTIONS  - Monitor and report changes in neurological status  - Monitor vital signs such as temperature, blood pressure, glucose, and any other labs ordered   - Initiate measures to prevent increased intracranial pressure  - Monitor for seizure activity and implement precautions if appropriate      Outcome: Progressing  Goal: Achieves maximal functionality and self care  Description: INTERVENTIONS  - Monitor swallowing and airway patency with patient fatigue and changes in neurological status  - Encourage and assist patient to increase activity and self care     - Encourage visually impaired, hearing impaired and aphasic patients to use assistive/communication devices  Outcome: Progressing     Problem: CARDIOVASCULAR - ADULT  Goal: Maintains optimal cardiac output and hemodynamic stability  Description: INTERVENTIONS:  - Monitor I/O, vital signs and rhythm  - Monitor for S/S and trends of decreased cardiac output  - Administer and titrate ordered vasoactive medications to optimize hemodynamic stability  - Assess quality of pulses, skin color and temperature  - Assess for signs of decreased coronary artery perfusion  - Instruct patient to report change in severity of symptoms  Outcome: Progressing  Goal: Absence of cardiac dysrhythmias or at baseline rhythm  Description: INTERVENTIONS:  - Continuous cardiac monitoring, vital signs, obtain 12 lead EKG if ordered  - Administer antiarrhythmic and heart rate control medications as ordered  - Monitor electrolytes and administer replacement therapy as ordered  Outcome: Progressing     Problem: RESPIRATORY - ADULT  Goal: Achieves optimal ventilation and oxygenation  Description: INTERVENTIONS:  - Assess for changes in respiratory status  - Assess for changes in mentation and behavior  - Position to facilitate oxygenation and minimize respiratory effort  - Oxygen administered by appropriate delivery if ordered  - Initiate smoking cessation education as indicated  - Encourage broncho-pulmonary hygiene including cough, deep breathe, Incentive Spirometry  - Assess the need for suctioning and aspirate as needed  - Assess and instruct to report SOB or any respiratory difficulty  - Respiratory Therapy support as indicated  Outcome: Progressing    times a day  - Out of bed for toileting  - Record patient progress and toleration of activity level   Outcome: Progressing     - Apply yellow socks and bracelet for high fall risk patients  - Consider moving patient to room near nurses station  Outcome: Progressing

## 2022-04-23 NOTE — ASSESSMENT & PLAN NOTE
Suspected by ams and lethargy w/hypoxia and hypercapnia responsive to narcan and fall w/recent admission for fall resultant in multiple broken ribs and liver contusion  -Reviewed prior meds (morphine sulfate, ativan)  -if hypoxia and somnolence remains improved can likely resume lower dose analgesics  -consult geriatrics for mgmt and assistance in polypharmacy

## 2022-04-23 NOTE — ASSESSMENT & PLAN NOTE
Likely 2* decreased PO intake given dry membranes    Does have autoimmune hx but UA bland and ct a/p negative for post renal obstructive etiology  rec'd 2L LR in ED will continue w/NS 75cc/hr and repeat bmp in am

## 2022-04-23 NOTE — TELEMEDICINE
TeleConsultation - [address] [facility] 78 y o  female MRN: [id number]  Unit/Bed#: E2 -01 Encounter: [id number]        REQUIRED DOCUMENTATION:     1  This service was provided via Telemedicine  2  Provider located at [place]  3  TeleMed provider: Corazon Alberto MD   4  Identify all parties in room with patient during tele consult:  Patient  5  Patient was then informed that this was a Telemedicine visit and that the exam was being conducted confidentially over secure lines  My office door was closed  No one else was in the room  Patient acknowledged consent and understanding of privacy and security of the Telemedicine visit, and gave us permission to have the assistant stay in the room in order to assist with the history and to conduct the exam   I informed the patient that I have reviewed their record in Epic and presented the opportunity for them to ask any questions regarding the visit today  The patient agreed to participate  Assessment/Plan     Assessment:  65-year-old female with history of bipolar 1 disorder presenting experiencing the following presenting with sedation responsive to Narcan felt to have acute metabolic encephalopathy/delirium with hallucinations and confusion likely secondary to noncompliance to medications with medication abuse  Mental status appears to be gradually improving through the course the day  Plan:   Risks, benefits and possible side effects of Medications:   Risks, benefits, and possible side effects of medications explained to patient and patient verbalizes understanding  As mental status appears to be gradually improving, would make no changes to the patient's outpatient psychiatric medication regimen while continuing to observe mental status  The patient will need to resume outpatient psychiatric follow-up upon discharge    An intensive outpatient treatment which may be provided by telehealth, would likely be of great benefit if available at this time  Re-consult Psychiatry as needed  Chief Complaint:  I fell after I drank too much alcohol    History of Present Illness     Reason for Consult / Principal Problem:  Bipolar 1 disorder with confusion and hallucinations  Patient is a 78 y o  female who presented to the emergency department with the provider documented the following:  In brief, this is 59-year-old female with history of chronic pain, bipolar disorder had a recent fall with multiple displaced rib fractures admitted to [facility] under trauma service from  through 3/8  Patient was also found to have a pneumothorax and liver contusion conservatively  Patient lives alone presented to the ED with fall at home  Patient does admit to taking an extra dose of lorazepam  According to patient's daughter patient has been confused and having hallucinations at home for the past 1 week  There is concern the patient is not able to take care of herself and not take her medications appropriately  Patient was given Narcan in the ED for somnolence"     The patient tells me that she rarely has any alcohol but then she had too much to drink the night of presentation and ended up falling  She is not sure how much alcohol she consumed  She denies use of other substances to me but had admitted to nursing that it was possible she may have overdosed on opioids  Nursing reports very mental status through the course the day today  Past psychiatric history:  The patient is his outpatient psychiatrist for bipolar 1 disorder  She states has been many months since she had a manic episode but she was depressed last week  She states her mood is currently improved  She states she continues to grieve the death of her son in 2018  Social history:  The patient is   She has 3 children 1 of whom is   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 lives alone        Family history:  The patient had aunt who received over shock treatments  Substance use history:  Patient states she rarely consumes any alcohol but admits she had too much to drink on the night of presentation  She denies use of other substances  Mental status examination:  The patient is alert well oriented in all spheres at this time  Nursing reports that earlier today the patient had been oriented only to person at other times with been oriented to person and place  This represents improvement in mental status  Affect was pleasant  She appeared jovial   She made good eye contact was cooperative with the evaluation  Sensorium is clear currently  Thought process was logical linear  Thought content was reality based  She denies any suicidal homicidal ideation  Speech was unremarkable  When asked about hallucinations she reported sometimes she saw somebody in a peripheral vision but when she looked directly that direction she did not see anybody there  Currently insight and judgment appear to be intact      Inpatient consult to Psychiatry  Consult performed by: Hiwot Zurita MD  Consult ordered by: Corina Quan MD              Past Medical History:   Diagnosis Date    Anxiety     Asthma     Chronic pain     Depression     Enterovirus heart infection        Medical Review Of Systems:  Review of Systems    Meds/Allergies   all current active meds have been reviewed  Allergies   Allergen Reactions    Penicillins        Objective   Vital signs in last 24 hours:  Temp:  [97 8 °F (36 6 °C)-98 9 °F (37 2 °C)] 98 9 °F (37 2 °C)  HR:  [] 86  Resp:  [16-24] 18  BP: ()/(47-90) 138/72      Intake/Output Summary (Last 24 hours) at 4/23/2022 1831  Last data filed at 4/23/2022 0020  Gross per 24 hour   Intake 2000 ml   Output --   Net 2000 ml         Lab Results:  Reviewed  Imaging Studies:  Reviewed  EKG, Pathology, and Other Studies:  Reviewed    Code Status: Level 1 - Full Code  Advance Directive and Living Will:      Power of : POLST:      Counseling / Coordination of Care  Total floor / unit time spent today 30 minutes  Greater than 50% of total time was spent with the patient and / or family counseling and / or coordination of care  A description of the counseling / coordination of care:  Chart review, patient evaluation, coordination communication with staff, nursing and provider

## 2022-04-23 NOTE — H&P
[address] [facility]  H&P- Omaira Maguire 1955, 78 y o  female MRN: [id number]  Unit/Bed#: E2 -01 Encounter: [id number]  Primary Care Provider: Nick Eric DO   Date and time admitted to hospital: 4/22/2022  9:30 PM    * LAILA (acute kidney injury) [facility])  Assessment & Plan  Likely 2* decreased PO intake given dry membranes  Does have autoimmune hx but UA bland and ct a/p negative for post renal obstructive etiology  rec'd 2L LR in ED will continue w/NS 75cc/hr and repeat bmp in am    [place] fall  Unable to r/o syncope  Has dried blood on lips possibly due to dehydration however will monitor on telemetry for arrhythmia, check orthostatics, gentle ivf, and check routine eeg    Polypharmacy  Assessment & Plan  Suspected by ams and lethargy w/hypoxia and hypercapnia responsive to narcan and fall w/recent admission for fall resultant in multiple broken ribs and liver contusion  -Reviewed prior meds (morphine sulfate, ativan)  -if hypoxia and somnolence remains improved can likely resume lower dose analgesics  -consult geriatrics for mgmt and assistance in polypharmacy    Bipolar depression [facility])  Assessment & Plan  Continue lamictal/wellbutrin    Severe protein-calorie malnutrition ([address] )  Assessment & Plan  Malnutrition Findings:     Consult nutrition                                BMI Findings: Body mass index is 24 37 kg/m²  Acute metabolic encephalopathy  Assessment & Plan  Initially somnolent but arousable and oriented  -S/p narcan now a&ox4 but does not recall episode around fall  -Ct head c spine a/p and cxr negative for acute trauma    Wet read cxr unremarkable although ct a/p concerning for possible small effusion on RLL  -procal minimally elevated in setting of significant LAILA but no s/sx of infection by ua cxr and ct a/p  -continue to monitor and reorient      VTE Prophylaxis: Heparin  / sequential compression device   Code Status: fc  POLST: There is no POLST form on file for this patient (pre-hospital)  Discussion with family:     Anticipated Length of Stay:  Patient will be admitted on an Inpatient basis with an anticipated length of stay of  greter than 2 midnights  Justification for Hospital Stay: fall, kenny    Total Time for Visit, including Counseling / Coordination of Care: 45 minutes  Greater than 50% of this total time spent on direct patient counseling and coordination of care  Chief Complaint:   fall    History of Present Illness:    Les Leonardo is a 78 y o  female who presents with pmh of chronic pain, bipolar d/o, recent fall w/multiple displaced rib fx w/o surgical intervention, traumatic ptx and liver contusion coming to hospital for fall  Pt was just hospitalized at [facility] in 03/2022 by trauma service from 3/1/22 thru 3/8/22  She was managed conservatively for the above diagnoses and did well except for difficulty w/pain mgmt which was done by epidural   There was no anemia regarding contusion  She did have a lumbar spine transverse process fx but bracing was deferred due to ribs fx  Sh then presented to ed for fall  She was initially quite somnolent and admission [facility]  She then became significantly hypoxic and was found to be hypercapnic which improved significantly w/narcan w/rapid improvement in lethargy/somnolence  She is a&ox4 but does not recall the events of the fall  She believes she remembers getting breakfast but not lunch/dinner    No other complaint except generalized pain/bodyaches after narcan administration    Review of Systems:    Review of Systems   Unable to perform ROS: Mental status change       Past Medical and Surgical History:     Past Medical History:   Diagnosis Date    Anxiety     Asthma     Chronic pain     Depression     Enterovirus heart infection        Past Surgical History:   Procedure Laterality Date    FIXATION KYPHOPLASTY LUMBAR SPINE      HYSTERECTOMY      REPAIR RECTOCELE         Meds/Allergies:    Prior to Admission medications    Medication Sig Start Date End Date Taking? Authorizing Provider   acetaminophen (TYLENOL) 325 mg tablet Take 3 tablets (975 mg total) by mouth every 8 (eight) hours 3/8/22   Tammy Cole PA-C   albuterol (PROVENTIL HFA,VENTOLIN HFA) 90 mcg/act inhaler Inhale 2 puffs every 6 (six) hours as needed for wheezing    Historical Provider, MD   buPROPion (WELLBUTRIN) 100 mg tablet Take 100 mg by mouth 2 (two) times a day    Historical Provider, MD   carvedilol (COREG) 25 mg tablet Take 25 mg by mouth 2 (two) times a day with meals    Historical Provider, MD   cyclobenzaprine (FLEXERIL) 5 mg tablet Take 1 tablet (5 mg total) by mouth 3 (three) times a day as needed for muscle spasms 4/7/22   Zachary Jones PA-C   gabapentin (NEURONTIN) 300 mg capsule Take 1 capsule (300 mg total) by mouth 2 (two) times a day 6/28/21   Eusebio Palmer MD   gabapentin (Neurontin) 300 mg capsule Take 1 capsule (300 mg total) by mouth 2 (two) times a day 4/7/22   Zachary Jones PA-C   hydroxychloroquine (PLAQUENIL) 200 mg tablet Take 1 tablet (200 mg total) by mouth 2 (two) times a day 1/10/22 7/9/22  China Resendiz PA-C   LamoTRIgine (LAMICTAL PO) Take 1 tablet by mouth daily at bedtime    Historical Provider, MD   lidocaine (Lidoderm) 5 % Apply 1 patch topically daily Remove & Discard patch within 12 hours or as directed by MD 3/9/22   Tammy Cole PA-C   LORazepam (ATIVAN) 1 mg tablet Take 1 tablet (1 mg total) by mouth daily at bedtime for 3 days 3/8/22 3/11/22  Tammy Cole PA-C   naloxone [facility]) 4 mg/0 1 mL nasal spray Administer 1 spray into a nostril  If no response after 2-3 minutes, give another dose in the other nostril using a new spray   4/7/22   Carolyn Diego PA-C   neomycin-bacitracin-polymyxin (NEOSPORIN) 5-400-5,000 ointment Apply topically 3 (three) times a day 7/21/21   Gigi Bowers, OWEN   oxyCODONE (Roxicodone) 5 immediate release tablet 2 5 mg to 5 mg PO every 6 hours hours as needed for moderate to severe pain  Ongoing therapy  4/7/22   Carolyn Pryor PA-C   pantoprazole (PROTONIX) 40 mg tablet Take 1 tablet (40 mg total) by mouth daily in the early morning 6/29/21   Gilles Yeung MD   polyethylene glycol (MIRALAX) 17 g packet Take 17 g by mouth daily as needed (Constipation) 3/8/22   Eagle Genao PA-C   senna-docusate sodium (SENOKOT S) 8 6-50 mg per tablet Take 1 tablet by mouth 2 (two) times a day 3/8/22   Eagle Genao PA-C   sodium chloride (OCEAN) 0 65 % nasal spray 1 spray into each nostril as needed for congestion 3/8/22   Eagle eGnao PA-C     I have reviewed home medications with patient personally  Allergies: Allergies   Allergen Reactions    Penicillins        Social History:     Marital Status:    Occupation:   Patient Pre-hospital Living Situation:   Patient Pre-hospital Level of Mobility:   Patient Pre-hospital Diet Restrictions:   Substance Use History:   Social History     Substance and Sexual Activity   Alcohol Use Not Currently    Comment: social     Social History     Tobacco Use   Smoking Status Never Smoker   Smokeless Tobacco Never Used     Social History     Substance and Sexual Activity   Drug Use Yes    Types: Marijuana    Comment: has medical card       Family History:    Family History   Problem Relation Age of Onset    No Known Problems Mother     No Known Problems Father        Physical Exam:     Vitals:   Blood Pressure: 105/60 (04/23/22 0234)  Pulse: 94 (04/23/22 0234)  Temperature: 97 8 °F (36 6 °C) (04/23/22 0234)  Temp Source: Oral (04/23/22 0234)  Respirations: 20 (04/23/22 0234)  Weight - Scale: 64 4 kg (141 lb 15 6 oz) (04/23/22 0234)  SpO2: 92 % (04/23/22 0234)    Physical Exam  Vitals reviewed  Constitutional:       General: She is not in acute distress       Appearance: She is not ill-appearing, toxic-appearing or diaphoretic  Comments: Chronically ill appearing  Appears older than stated age   HENT:      Head: Normocephalic and atraumatic  Right Ear: External ear normal       Left Ear: External ear normal       Nose: Nose normal    Eyes:      Extraocular Movements: Extraocular movements intact  Neurological:      Mental Status: She is alert  (  Be Sure to Include Physical Exam: Delete this entire line when you have entered your exam)    Additional Data:     Lab Results: I have personally reviewed pertinent reports  Results from last 7 days   Lab Units 04/22/22  2208   WBC Thousand/uL 7 73   HEMOGLOBIN g/dL 11 8   HEMATOCRIT % 36 8   PLATELETS Thousands/uL 187   NEUTROS PCT % 71   LYMPHS PCT % 17   MONOS PCT % 9   EOS PCT % 1     Results from last 7 days   Lab Units 04/22/22  2208   SODIUM mmol/L 139   POTASSIUM mmol/L 4 5   CHLORIDE mmol/L 103   CO2 mmol/L 25   BUN mg/dL 35*   CREATININE mg/dL 2 68*   ANION GAP mmol/L 11   CALCIUM mg/dL 9 7   ALBUMIN g/dL 3 8   TOTAL BILIRUBIN mg/dL 0 62   ALK PHOS U/L 95   ALT U/L 19   AST U/L 24   GLUCOSE RANDOM mg/dL 67     Results from last 7 days   Lab Units 04/22/22  2208   INR  1 12             Results from last 7 days   Lab Units 04/22/22  2208   LACTIC ACID mmol/L 1 8   PROCALCITONIN ng/ml 0 28*       Imaging: I have personally reviewed pertinent reports  CT abdomen pelvis wo contrast   Final Result by Anna Taylor MD (04/22 2331)         1  Stable right posterior 10th and 11th and 12 rib fractures and chronic T11, L1 and L4 fractures  2   No evidence of acute intra-abdominal or pelvic trauma  Workstation performed: [id number]         XR chest 1 view portable   ED Interpretation by Yoly Romero MD (04/22 2333)   No acute cardiopulmonary disease as interpreted by myself  XR hip/pelv 2-3 vws left   ED Interpretation by Yoly Romero MD (04/22 2333)   No acute osseous abnormality as interpreted by myself        CT head without contrast   Final Result by Douglas Gar MD (04/22 2240)      No acute intracranial abnormality  Workstation performed: [id number]         CT cervical spine without contrast   Final Result by Douglas Gar MD (04/22 2255)      No cervical spine fracture or traumatic malalignment  Moderate multilevel spondylotic degenerative changes of the cervical spine  Workstation performed: [id number]             EKG, Pathology, and Other Studies Reviewed on Admission:   · EKG: nsr    Allscripts / Epic Records Reviewed: Yes     ** Please Note: This note has been constructed using a voice recognition system   **

## 2022-04-23 NOTE — PLAN OF CARE
Problem: Potential for Falls  Goal: Patient will remain free of falls  Description: INTERVENTIONS:  - Educate patient/family on patient safety including physical limitations  - Instruct patient to call for assistance with activity   - Consult OT/PT to assist with strengthening/mobility   - Keep Call bell within reach  - Keep bed low and locked with side rails adjusted as appropriate  - Keep care items and personal belongings within reach  - Initiate and maintain comfort rounds  - Make Fall Risk Sign visible to staff  - Offer Toileting every 2 Hours, in advance of need  - Initiate/Maintain bed alarm  - Obtain necessary fall risk management equipment  - Apply yellow socks and bracelet for high fall risk patients  - Consider moving patient to room near nurses station  Outcome: Progressing     Problem: MOBILITY - ADULT  Goal: Maintain or return to baseline ADL function  Description: INTERVENTIONS:  -  Assess patient's ability to carry out ADLs; assess patient's baseline for ADL function and identify physical deficits which impact ability to perform ADLs (bathing, care of mouth/teeth, toileting, grooming, dressing, etc )  - Assess/evaluate cause of self-care deficits   - Assess range of motion  - Assess patient's mobility; develop plan if impaired  - Assess patient's need for assistive devices and provide as appropriate  - Encourage maximum independence but intervene and supervise when necessary  - Involve family in performance of ADLs  - Assess for home care needs following discharge   - Consider OT consult to assist with ADL evaluation and planning for discharge  - Provide patient education as appropriate  Outcome: Progressing  Goal: Maintains/Returns to pre admission functional level  Description: INTERVENTIONS:  - Perform BMAT or MOVE assessment daily    - Set and communicate daily mobility goal to care team and patient/family/caregiver     - Collaborate with rehabilitation services on mobility goals if consulted  - Perform Range of Motion 2 times a day  - Reposition patient every 2 hours    - Dangle patient 2 times a day  - Stand patient 2 times a day  - Ambulate patient 2 times a day  - Out of bed to chair 2 times a day   - Out of bed for meals 2 times a day  - Out of bed for toileting  - Record patient progress and toleration of activity level   Outcome: Progressing     Problem: PAIN - ADULT  Goal: Verbalizes/displays adequate comfort level or baseline comfort level  Description: Interventions:  - Encourage patient to monitor pain and request assistance  - Assess pain using appropriate pain scale  - Administer analgesics based on type and severity of pain and evaluate response  - Implement non-pharmacological measures as appropriate and evaluate response  - Consider cultural and social influences on pain and pain management  - Notify physician/advanced practitioner if interventions unsuccessful or patient reports new pain  Outcome: Progressing     Problem: INFECTION - ADULT  Goal: Absence or prevention of progression during hospitalization  Description: INTERVENTIONS:  - Assess and monitor for signs and symptoms of infection  - Monitor lab/diagnostic results  - Monitor all insertion sites, i e  indwelling lines, tubes, and drains  - Monitor endotracheal if appropriate and nasal secretions for changes in amount and color  - [place] appropriate cooling/warming therapies per order  - Administer medications as ordered  - Instruct and encourage patient and family to use good hand hygiene technique  - Identify and instruct in appropriate isolation precautions for identified infection/condition  Outcome: Progressing     Problem: DISCHARGE PLANNING  Goal: Discharge to home or other facility with appropriate resources  Description: INTERVENTIONS:  - Identify barriers to discharge w/patient and caregiver  - Arrange for needed discharge resources and transportation as appropriate  - Identify discharge learning needs (meds, wound care, etc )  - Arrange for interpretive services to assist at discharge as needed  - Refer to Case Management Department for coordinating discharge planning if the patient needs post-hospital services based on physician/advanced practitioner order or complex needs related to functional status, cognitive ability, or social support system  Outcome: Progressing     Problem: Knowledge Deficit  Goal: Patient/family/caregiver demonstrates understanding of disease process, treatment plan, medications, and discharge instructions  Description: Complete learning assessment and assess knowledge base  Interventions:  - Provide teaching at level of understanding  - Provide teaching via preferred learning methods  Outcome: Progressing     Problem: NEUROSENSORY - ADULT  Goal: Achieves stable or improved neurological status  Description: INTERVENTIONS  - Monitor and report changes in neurological status  - Monitor vital signs such as temperature, blood pressure, glucose, and any other labs ordered   - Initiate measures to prevent increased intracranial pressure  - Monitor for seizure activity and implement precautions if appropriate      Outcome: Progressing  Goal: Achieves maximal functionality and self care  Description: INTERVENTIONS  - Monitor swallowing and airway patency with patient fatigue and changes in neurological status  - Encourage and assist patient to increase activity and self care     - Encourage visually impaired, hearing impaired and aphasic patients to use assistive/communication devices  Outcome: Progressing     Problem: CARDIOVASCULAR - ADULT  Goal: Maintains optimal cardiac output and hemodynamic stability  Description: INTERVENTIONS:  - Monitor I/O, vital signs and rhythm  - Monitor for S/S and trends of decreased cardiac output  - Administer and titrate ordered vasoactive medications to optimize hemodynamic stability  - Assess quality of pulses, skin color and temperature  - Assess for signs of decreased coronary artery perfusion  - Instruct patient to report change in severity of symptoms  Outcome: Progressing  Goal: Absence of cardiac dysrhythmias or at baseline rhythm  Description: INTERVENTIONS:  - Continuous cardiac monitoring, vital signs, obtain 12 lead EKG if ordered  - Administer antiarrhythmic and heart rate control medications as ordered  - Monitor electrolytes and administer replacement therapy as ordered  Outcome: Progressing     Problem: RESPIRATORY - ADULT  Goal: Achieves optimal ventilation and oxygenation  Description: INTERVENTIONS:  - Assess for changes in respiratory status  - Assess for changes in mentation and behavior  - Position to facilitate oxygenation and minimize respiratory effort  - Oxygen administered by appropriate delivery if ordered  - Initiate smoking cessation education as indicated  - Encourage broncho-pulmonary hygiene including cough, deep breathe, Incentive Spirometry  - Assess the need for suctioning and aspirate as needed  - Assess and instruct to report SOB or any respiratory difficulty  - Respiratory Therapy support as indicated  Outcome: Progressing     Problem: Prexisting or High Potential for Compromised Skin Integrity  Goal: Skin integrity is maintained or improved  Description: INTERVENTIONS:  - Identify patients at risk for skin breakdown  - Assess and monitor skin integrity  - Assess and monitor nutrition and hydration status  - Monitor labs   - Assess for incontinence   - Turn and reposition patient  - Assist with mobility/ambulation  - Relieve pressure over bony prominences  - Avoid friction and shearing  - Provide appropriate hygiene as needed including keeping skin clean and dry  - Evaluate need for skin moisturizer/barrier cream  - Collaborate with interdisciplinary team   - Patient/family teaching  - Consider wound care consult   Outcome: Progressing

## 2022-04-23 NOTE — ASSESSMENT & PLAN NOTE
Unwitnessed fall  Unable to r/o syncope    Has dried blood on lips possibly due to dehydration however will monitor on telemetry for arrhythmia, check orthostatics, gentle ivf, and check routine eeg

## 2022-04-23 NOTE — ED NOTES
While sleeping patient goes down to mid 70s with good pleth on pulse oximetry  Patient put on 3L of oxygen via NC to maintain sats >92%  Dr Bright Prater aware        Cherylynn Leventhal, JULISA  04/22/22 [id number]

## 2022-04-23 NOTE — ED PROVIDER NOTES
History  Chief Complaint   Patient presents with   Aramis Pila Fall     PT arrives with brother, brother states pt has not been herself all week according to daughter "has been hallucinating", pt has hx of UTIs  Per brother pt also had an unwitness fall in backyard today unsure how   Possible UTI     This is a 69-year-old female with a history of chronic pain, bipolar disorder who presents with confusion and a fall  Per report, has been acting abnormally over the past week including hallucinations and falls  Per report, the patient had an unwitnessed fall in the backyard today  Patient is alert oriented x3, but has difficulty recalling events  She has difficulty recalling why she is in the hospital   She is unable to explain the circumstances around her fall today  Patient continues to talk about her previous hospitalization in March for a fall  Prior to Admission Medications   Prescriptions Last Dose Informant Patient Reported? Taking?    LORazepam (ATIVAN) 1 mg tablet   No No   Sig: Take 1 tablet (1 mg total) by mouth daily at bedtime for 3 days   LamoTRIgine (LAMICTAL PO)   Yes No   Sig: Take 1 tablet by mouth daily at bedtime   acetaminophen (TYLENOL) 325 mg tablet   No No   Sig: Take 3 tablets (975 mg total) by mouth every 8 (eight) hours   albuterol (PROVENTIL HFA,VENTOLIN HFA) 90 mcg/act inhaler   Yes No   Sig: Inhale 2 puffs every 6 (six) hours as needed for wheezing   buPROPion (WELLBUTRIN) 100 mg tablet   Yes No   Sig: Take 100 mg by mouth 2 (two) times a day   carvedilol (COREG) 25 mg tablet   Yes No   Sig: Take 25 mg by mouth 2 (two) times a day with meals   cyclobenzaprine (FLEXERIL) 5 mg tablet   No No   Sig: Take 1 tablet (5 mg total) by mouth 3 (three) times a day as needed for muscle spasms   gabapentin (NEURONTIN) 300 mg capsule   No No   Sig: Take 1 capsule (300 mg total) by mouth 2 (two) times a day   gabapentin (Neurontin) 300 mg capsule   No No   Sig: Take 1 capsule (300 mg total) by mouth 2 (two) times a day   hydroxychloroquine (PLAQUENIL) 200 mg tablet   No No   Sig: Take 1 tablet (200 mg total) by mouth 2 (two) times a day   lidocaine (Lidoderm) 5 %   No No   Sig: Apply 1 patch topically daily Remove & Discard patch within 12 hours or as directed by MD   naloxone (NARCAN) 4 mg/0 1 mL nasal spray   No No   Sig: Administer 1 spray into a nostril  If no response after 2-3 minutes, give another dose in the other nostril using a new spray  neomycin-bacitracin-polymyxin (NEOSPORIN) 5-400-5,000 ointment   No No   Sig: Apply topically 3 (three) times a day   oxyCODONE (Roxicodone) 5 immediate release tablet   No No   Si 5 mg to 5 mg PO every 6 hours hours as needed for moderate to severe pain  Ongoing therapy  pantoprazole (PROTONIX) 40 mg tablet   No No   Sig: Take 1 tablet (40 mg total) by mouth daily in the early morning   polyethylene glycol (MIRALAX) 17 g packet   No No   Sig: Take 17 g by mouth daily as needed (Constipation)   senna-docusate sodium (SENOKOT S) 8 6-50 mg per tablet   No No   Sig: Take 1 tablet by mouth 2 (two) times a day   sodium chloride (OCEAN) 0 65 % nasal spray   No No   Si spray into each nostril as needed for congestion      Facility-Administered Medications: None       Past Medical History:   Diagnosis Date    Anxiety     Asthma     Chronic pain     Depression     Enterovirus heart infection        Past Surgical History:   Procedure Laterality Date    FIXATION KYPHOPLASTY LUMBAR SPINE      HYSTERECTOMY      REPAIR RECTOCELE         Family History   Problem Relation Age of Onset    No Known Problems Mother     No Known Problems Father      I have reviewed and agree with the history as documented      E-Cigarette/Vaping    E-Cigarette Use Never User      E-Cigarette/Vaping Substances    Nicotine No     THC No     CBD No     Flavoring No     Other No     Unknown No      Social History     Tobacco Use    Smoking status: Never Smoker    Smokeless tobacco: Never Used   Vaping Use    Vaping Use: Never used   Substance Use Topics    Alcohol use: Not Currently     Comment: social    Drug use: Yes     Types: Marijuana     Comment: has medical card       Review of Systems   Unable to perform ROS: Mental status change       Physical Exam  Physical Exam  Constitutional:       Appearance: Normal appearance  She is well-developed and underweight  She is not ill-appearing or toxic-appearing  Comments: Chronically ill-appearing  HENT:      Head: Normocephalic  Comments: Ecchymosis to the left temporal area  No tenderness over bilateral jaw line  Nose: Nose normal       Mouth/Throat:      Mouth: Mucous membranes are dry  Dentition: Has dentures  Pharynx: Oropharynx is clear  Uvula midline  No pharyngeal swelling, oropharyngeal exudate, posterior oropharyngeal erythema or uvula swelling  Tonsils: No tonsillar exudate or tonsillar abscesses  Eyes:      General: Lids are normal       Conjunctiva/sclera: Conjunctivae normal       Pupils: Pupils are equal, round, and reactive to light  Neck:      Trachea: Trachea normal  No tracheal tenderness, tracheostomy, abnormal tracheal secretions or tracheal deviation  Cardiovascular:      Rate and Rhythm: Normal rate and regular rhythm  Heart sounds: Normal heart sounds  No murmur heard  Pulmonary:      Effort: Pulmonary effort is normal       Breath sounds: Normal breath sounds  Chest:      Chest wall: No tenderness or crepitus  Abdominal:      General: Bowel sounds are normal       Palpations: Abdomen is soft  Abdomen is not rigid  Tenderness: There is no abdominal tenderness  There is no guarding or rebound  Musculoskeletal:      Cervical back: Full passive range of motion without pain, normal range of motion and neck supple  No spinous process tenderness  Comments: No C-spine, T-spine, L-spine tenderness  No bony tenderness throughout but otherwise noted  No other evidence of trauma  Patient able to range all joints without pain  Old appearing ecchymosis to the left upper extremity  Abrasions to left knee  Old appearing ecchymosis to bilateral tibias  Small abrasion of the right ankle without tenderness  Pain with logroll of the left hip  Lymphadenopathy:      Cervical: No cervical adenopathy  Neurological:      Mental Status: She is alert and oriented to person, place, and time  GCS: GCS eye subscore is 4  GCS verbal subscore is 4  GCS motor subscore is 6  Cranial Nerves: Cranial nerves are intact  Sensory: Sensation is intact  Motor: Motor function is intact  Comments: Cranial nerves 2-12 intact, strength 5/5 throughout, sensation intact throughout  Psychiatric:         Speech: Speech normal          Behavior: Behavior normal  Behavior is cooperative  Thought Content:  Thought content normal          Vital Signs  ED Triage Vitals   Temperature Pulse Respirations Blood Pressure SpO2   04/22/22 2124 04/22/22 2129 04/22/22 2129 04/22/22 2129 04/22/22 2129   98 °F (36 7 °C) 89 16 (!) 88/51 95 %      Temp Source Heart Rate Source Patient Position - Orthostatic VS BP Location FiO2 (%)   04/22/22 2124 04/22/22 2129 -- -- --   Oral Monitor         Pain Score       --                  Vitals:    04/22/22 2350 04/23/22 0037 04/23/22 0038 04/23/22 0052   BP: (!) 88/53  (!) 87/53 100/67   Pulse: 76 84  105         Visual Acuity      ED Medications  Medications   lactated ringers bolus 2,000 mL (0 mL Intravenous Stopped 4/23/22 0020)   naloxone (NARCAN) injection 2 mg (2 mg Intravenous Given 4/23/22 0049)       Diagnostic Studies  Results Reviewed     Procedure Component Value Units Date/Time    Blood gas, venous [[id number]]  (Abnormal) Collected: 04/23/22 0029    Lab Status: Final result Specimen: Blood from Arm, Left Updated: 04/23/22 0041     pH, Cedrick 7 111     pCO2, Cedrick 77 1 mm Hg      pO2, Cedrick 55 5 mm Hg      HCO3, Cedrick 24 0 mmol/L      Base Excess, Cedrick -6 4 mmol/L      O2 Content, Cedrick 12 5 ml/dL      O2 HGB, VENOUS 80 8 %     Ammonia [[id number]] Collected: 04/23/22 0029    Lab Status: In process Specimen: Blood from Arm, Left Updated: 04/23/22 0033    UA w Reflex to Microscopic w Reflex to Culture [[id number]] Collected: 04/22/22 2327    Lab Status: Final result Specimen: Urine, Straight Cath Updated: 04/22/22 2344     Color, UA Yellow     Clarity, UA Clear     Specific Gravity, UA >=1 030     pH, UA 5 5     Leukocytes, UA Negative     Nitrite, UA Negative     Protein, UA Negative mg/dl      Glucose, UA Negative mg/dl      Ketones, UA Negative mg/dl      Urobilinogen, UA 0 2 E U /dl      Bilirubin, UA Negative     Blood, UA Negative    Blood culture #1 [[id number]] Collected: 04/22/22 2208    Lab Status: In process Specimen: Blood from Arm, Left Updated: 04/22/22 2328    CKMB [[id number]]  (Abnormal) Collected: 04/22/22 2208    Lab Status: Final result Specimen: Blood from Arm, Left Updated: 04/22/22 2302     CK-MB Index 2 8 %      CK-MB 6 5 ng/mL     Salicylate level [[id number]]  (Abnormal) Collected: 04/22/22 2208    Lab Status: Final result Specimen: Blood from Arm, Left Updated: 46/58/85 1439     Salicylate Lvl <3 mg/dL     Acetaminophen level-If concentration is detectable, please discuss with medical  on call   [[id number]]  (Abnormal) Collected: 04/22/22 2208    Lab Status: Final result Specimen: Blood from Arm, Left Updated: 04/22/22 2302     Acetaminophen Level <2 ug/mL     CK Total with Reflex CKMB [[id number]]  (Abnormal) Collected: 04/22/22 2208    Lab Status: Final result Specimen: Blood from Arm, Left Updated: 04/22/22 2256     Total  U/L     Procalcitonin [[id number]]  (Abnormal) Collected: 04/22/22 2208    Lab Status: Final result Specimen: Blood from Arm, Left Updated: 04/22/22 2243     Procalcitonin 0 28 ng/ml     Lactic Acid [[id number]]  (Normal) Collected: 04/22/22 2208    Lab Status: Final result Specimen: Blood from Arm, Left Updated: 04/22/22 2241     LACTIC ACID 1 8 mmol/L     Narrative:      Result may be elevated if tourniquet was used during collection      Comprehensive metabolic panel [[id number]]  (Abnormal) Collected: 04/22/22 2208    Lab Status: Final result Specimen: Blood from Arm, Left Updated: 04/22/22 2237     Sodium 139 mmol/L      Potassium 4 5 mmol/L      Chloride 103 mmol/L      CO2 25 mmol/L      ANION GAP 11 mmol/L      BUN 35 mg/dL      Creatinine 2 68 mg/dL      Glucose 67 mg/dL      Calcium 9 7 mg/dL      AST 24 U/L      ALT 19 U/L      Alkaline Phosphatase 95 U/L      Total Protein 6 6 g/dL      Albumin 3 8 g/dL      Total Bilirubin 0 62 mg/dL      eGFR 17 ml/min/1 73sq m     Narrative:      [place] guidelines for Chronic Kidney Disease (CKD):     Stage 1 with normal or high GFR (GFR > 90 mL/min/1 73 square meters)    Stage 2 Mild CKD (GFR = 60-89 mL/min/1 73 square meters)    Stage 3A Moderate CKD (GFR = 45-59 mL/min/1 73 square meters)    Stage 3B Moderate CKD (GFR = 30-44 mL/min/1 73 square meters)    Stage 4 Severe CKD (GFR = 15-29 mL/min/1 73 square meters)    Stage 5 End Stage CKD (GFR <15 mL/min/1 73 square meters)  Note: GFR calculation is accurate only with a steady state creatinine    Ethanol [[id number]]  (Normal) Collected: 04/22/22 2208    Lab Status: Final result Specimen: Blood from Arm, Left Updated: 04/22/22 2231     Ethanol Lvl 3 mg/dL     Protime-INR [[id number]]  (Normal) Collected: 04/22/22 2208    Lab Status: Final result Specimen: Blood from Arm, Left Updated: 04/22/22 2230     Protime 14 0 seconds      INR 1 12    APTT [[id number]]  (Normal) Collected: 04/22/22 2208    Lab Status: Final result Specimen: Blood from Arm, Left Updated: 04/22/22 2230     PTT 28 seconds     CBC and differential [[id number]] Collected: 04/22/22 2208    Lab Status: Final result Specimen: Blood from Arm, Left Updated: 04/22/22 2217     WBC 7 73 Thousand/uL      RBC 3 83 Million/uL      Hemoglobin 11 8 g/dL      Hematocrit 36 8 %      MCV 96 fL      MCH 30 8 pg      MCHC 32 1 g/dL      RDW 13 0 %      MPV 10 5 fL      Platelets 598 Thousands/uL      nRBC 0 /100 WBCs      Neutrophils Relative 71 %      Immat GRANS % 1 %      Lymphocytes Relative 17 %      Monocytes Relative 9 %      Eosinophils Relative 1 %      Basophils Relative 1 %      Neutrophils Absolute 5 55 Thousands/µL      Immature Grans Absolute 0 04 Thousand/uL      Lymphocytes Absolute 1 31 Thousands/µL      Monocytes Absolute 0 71 Thousand/µL      Eosinophils Absolute 0 08 Thousand/µL      Basophils Absolute 0 04 Thousands/µL     Blood culture #2 [[id number]] Collected: 04/22/22 2208    Lab Status: In process Specimen: Blood from Arm, Right Updated: 04/22/22 2215                 CT abdomen pelvis wo contrast   Final Result by Terrie Boas, MD (04/22 2331)         1  Stable right posterior 10th and 11th and 12 rib fractures and chronic T11, L1 and L4 fractures  2   No evidence of acute intra-abdominal or pelvic trauma  Workstation performed: [id number]         XR chest 1 view portable   ED Interpretation by Puma Delgado MD (04/22 2333)   No acute cardiopulmonary disease as interpreted by myself  XR hip/pelv 2-3 vws left   ED Interpretation by Puma Delgado MD (04/22 2333)   No acute osseous abnormality as interpreted by myself  CT head without contrast   Final Result by Douglas Gar MD (04/22 2240)      No acute intracranial abnormality  Workstation performed: [id number]         CT cervical spine without contrast   Final Result by Douglas Gar MD (04/22 2255)      No cervical spine fracture or traumatic malalignment  Moderate multilevel spondylotic degenerative changes of the cervical spine               Workstation performed: [id number]                    Procedures  ECG 12 Lead Documentation Only    Date/Time: 4/22/2022 11:37 PM  Performed by: Julius Raines MD  Authorized by: Julius Raines MD     ECG reviewed by me, the ED Provider: yes    Patient location:  ED  Previous ECG:     Previous ECG:  Compared to current    Comparison ECG info:  6/25/21    Similarity:  No change    Comparison to cardiac monitor: Yes    Interpretation:     Interpretation: non-specific    Quality:     Tracing quality:  Limited by artifact  Rate:     ECG rate:  83    ECG rate assessment: normal    Rhythm:     Rhythm: sinus rhythm    Ectopy:     Ectopy: none    QRS:     QRS axis:  Normal    QRS intervals:  Normal  Conduction:     Conduction: normal    ST segments:     ST segments:  Normal  T waves:     T waves: non-specific      CriticalCare Time  Performed by: Julius Raines MD  Authorized by: Julius Raines MD     Critical care provider statement:     Critical care time (minutes):  45    Critical care start time:  4/22/2022 11:30 PM    Critical care end time:  4/23/2022 12:15 AM    Critical care time was exclusive of:  Separately billable procedures and treating other patients    Critical care was necessary to treat or prevent imminent or life-threatening deterioration of the following conditions:  Respiratory failure    Critical care was time spent personally by me on the following activities:  Obtaining history from patient or surrogate, development of treatment plan with patient or surrogate, evaluation of patient's response to treatment, examination of patient, review of old charts, re-evaluation of patient's condition, ordering and review of radiographic studies and ordering and review of laboratory studies    I assumed direction of critical care for this patient from another provider in my specialty: no               ED Course  ED Course as of 04/23/22 0058   Fri Apr 22, 2022 2147 Blood Pressure(!): 88/51  Fairly consistent with previous recorded in patient blood pressures     2237 Creatinine(!): 2 68  Receiving aggressive fluid resusc Sat Apr 23, 2022   0017 Patient desaturating  Placed on 10 L non-rebreather  Patient breathing through her mouth  No swelling to the throat or oropharynx  No jaw tenderness  Patient able to open and close her mouth  Notified slim  0230 Patient given Narcan with immediate response  I suspect polypharmacy is contributing to her pH and pCO2  SBIRT 22yo+      Most Recent Value   SBIRT (22 yo +)    In order to provide better care to our patients, we are screening all of our patients for alcohol and drug use  Would it be okay to ask you these screening questions? No Filed at: 04/22/2022 2328                    MDM  Number of Diagnoses or Management Options  Diagnosis management comments: Will check labs, EKG, urinalysis  CT head/neck  X-ray of the chest and left hip  IV fluids  Likely admission  Disposition  Final diagnoses:   LAILA (acute kidney injury) ([facility] )   Acute metabolic encephalopathy   Acute respiratory failure with hypoxia ([facility] )     Time reflects when diagnosis was documented in both MDM as applicable and the Disposition within this note     Time User Action Codes Description Comment    4/22/2022 11:40 PM Klahr Sails P Add [N17 9] LAILA (acute kidney injury) ([facility] )     4/22/2022 11:40 PM Klahr Sails P Add [L09 64] Acute metabolic encephalopathy     4/23/2022 12:17 AM Klahr Sails P Add [J96 01] Acute respiratory failure with hypoxia [facility])       ED Disposition     ED Disposition Condition Date/Time Comment    Admit Stable Fri Apr 22, 2022 11:40 PM         Follow-up Information    None         Patient's Medications   Discharge Prescriptions    No medications on file       No discharge procedures on file      PDMP Review       Value Time User    PDMP Reviewed  Yes 4/7/2022  3:22 PM Janna Pryor PA-C          ED Provider  Electronically Signed by           Christina Barkley MD  04/23/22 [address], MD  04/23/22 [address], MD  04/23/22 [address], MD  04/23/22 [id number]

## 2022-04-23 NOTE — ED NOTES
Called admitting floor to have telemetry set up prior to arrival      Per admitting team, pt is okay for med surg        Merary Sanchez RN  04/23/22 [id number]

## 2022-04-23 NOTE — ASSESSMENT & PLAN NOTE
Initially somnolent but arousable and oriented  -S/p narcan now a&ox4 but does not recall episode around fall  -Ct head c spine a/p and cxr negative for acute trauma    Wet read cxr unremarkable although ct a/p concerning for possible small effusion on RLL  -procal minimally elevated in setting of significant LAILA but no s/sx of infection by ua cxr and ct a/p  -continue to monitor and reorient

## 2022-04-23 NOTE — ED NOTES
Pt found to be on 50% with nasal cannula and good pleth  Pt put on 15L nonrebreather and MD at bedside to reevaluate        Piter Jones, RN  04/23/22 0022

## 2022-04-23 NOTE — ASSESSMENT & PLAN NOTE
Malnutrition Findings:     Consult nutrition                                BMI Findings: Body mass index is 24 37 kg/m²

## 2022-04-24 LAB
ANION GAP SERPL CALCULATED.3IONS-SCNC: 9 MMOL/L (ref 4–13)
BASOPHILS # BLD AUTO: 0.02 THOUSANDS/ΜL (ref 0–0.1)
BASOPHILS NFR BLD AUTO: 0 % (ref 0–1)
BUN SERPL-MCNC: 12 MG/DL (ref 5–25)
CALCIUM SERPL-MCNC: 8.8 MG/DL (ref 8.3–10.1)
CHLORIDE SERPL-SCNC: 107 MMOL/L (ref 100–108)
CO2 SERPL-SCNC: 24 MMOL/L (ref 21–32)
CREAT SERPL-MCNC: 1.04 MG/DL (ref 0.6–1.3)
EOSINOPHIL # BLD AUTO: 0.07 THOUSAND/ΜL (ref 0–0.61)
EOSINOPHIL NFR BLD AUTO: 2 % (ref 0–6)
ERYTHROCYTE [DISTWIDTH] IN BLOOD BY AUTOMATED COUNT: 13.2 % (ref 11.6–15.1)
GFR SERPL CREATININE-BSD FRML MDRD: 55 ML/MIN/1.73SQ M
GLUCOSE SERPL-MCNC: 85 MG/DL (ref 65–140)
HCT VFR BLD AUTO: 29.4 % (ref 34.8–46.1)
HGB BLD-MCNC: 9.1 G/DL (ref 11.5–15.4)
IMM GRANULOCYTES # BLD AUTO: 0.02 THOUSAND/UL (ref 0–0.2)
IMM GRANULOCYTES NFR BLD AUTO: 0 % (ref 0–2)
LYMPHOCYTES # BLD AUTO: 0.85 THOUSANDS/ΜL (ref 0.6–4.47)
LYMPHOCYTES NFR BLD AUTO: 19 % (ref 14–44)
MCH RBC QN AUTO: 29.9 PG (ref 26.8–34.3)
MCHC RBC AUTO-ENTMCNC: 31 G/DL (ref 31.4–37.4)
MCV RBC AUTO: 97 FL (ref 82–98)
MONOCYTES # BLD AUTO: 0.63 THOUSAND/ΜL (ref 0.17–1.22)
MONOCYTES NFR BLD AUTO: 14 % (ref 4–12)
NEUTROPHILS # BLD AUTO: 2.97 THOUSANDS/ΜL (ref 1.85–7.62)
NEUTS SEG NFR BLD AUTO: 65 % (ref 43–75)
NRBC BLD AUTO-RTO: 0 /100 WBCS
PLATELET # BLD AUTO: 133 THOUSANDS/UL (ref 149–390)
PMV BLD AUTO: 11.1 FL (ref 8.9–12.7)
POTASSIUM SERPL-SCNC: 3.5 MMOL/L (ref 3.5–5.3)
RBC # BLD AUTO: 3.04 MILLION/UL (ref 3.81–5.12)
SODIUM SERPL-SCNC: 140 MMOL/L (ref 136–145)
WBC # BLD AUTO: 4.56 THOUSAND/UL (ref 4.31–10.16)

## 2022-04-24 PROCEDURE — 80048 BASIC METABOLIC PNL TOTAL CA: CPT | Performed by: INTERNAL MEDICINE

## 2022-04-24 PROCEDURE — 97163 PT EVAL HIGH COMPLEX 45 MIN: CPT

## 2022-04-24 PROCEDURE — 99232 SBSQ HOSP IP/OBS MODERATE 35: CPT | Performed by: INTERNAL MEDICINE

## 2022-04-24 PROCEDURE — 85025 COMPLETE CBC W/AUTO DIFF WBC: CPT | Performed by: INTERNAL MEDICINE

## 2022-04-24 PROCEDURE — 97116 GAIT TRAINING THERAPY: CPT

## 2022-04-24 PROCEDURE — 97530 THERAPEUTIC ACTIVITIES: CPT

## 2022-04-24 RX ADMIN — HEPARIN SODIUM 5000 UNITS: 5000 INJECTION INTRAVENOUS; SUBCUTANEOUS at 06:15

## 2022-04-24 RX ADMIN — ACETAMINOPHEN 325MG 650 MG: 325 TABLET ORAL at 06:14

## 2022-04-24 RX ADMIN — OXYCODONE HYDROCHLORIDE 5 MG: 5 TABLET ORAL at 21:10

## 2022-04-24 RX ADMIN — BUPROPION HYDROCHLORIDE 450 MG: 150 TABLET, FILM COATED, EXTENDED RELEASE ORAL at 08:53

## 2022-04-24 RX ADMIN — SODIUM CHLORIDE 75 ML/HR: 0.9 INJECTION, SOLUTION INTRAVENOUS at 00:45

## 2022-04-24 RX ADMIN — HYDROXYCHLOROQUINE SULFATE 200 MG: 200 TABLET, FILM COATED ORAL at 17:06

## 2022-04-24 RX ADMIN — LAMOTRIGINE 150 MG: 100 TABLET ORAL at 08:53

## 2022-04-24 RX ADMIN — PANTOPRAZOLE SODIUM 40 MG: 40 TABLET, DELAYED RELEASE ORAL at 06:15

## 2022-04-24 RX ADMIN — HEPARIN SODIUM 5000 UNITS: 5000 INJECTION INTRAVENOUS; SUBCUTANEOUS at 14:59

## 2022-04-24 RX ADMIN — OXYCODONE HYDROCHLORIDE 5 MG: 5 TABLET ORAL at 05:23

## 2022-04-24 RX ADMIN — OXYCODONE HYDROCHLORIDE 5 MG: 5 TABLET ORAL at 12:45

## 2022-04-24 RX ADMIN — HYDROXYCHLOROQUINE SULFATE 200 MG: 200 TABLET, FILM COATED ORAL at 08:53

## 2022-04-24 RX ADMIN — ACETAMINOPHEN 325MG 650 MG: 325 TABLET ORAL at 00:57

## 2022-04-24 RX ADMIN — LORAZEPAM 0.5 MG: 2 INJECTION INTRAMUSCULAR; INTRAVENOUS at 23:45

## 2022-04-24 RX ADMIN — HEPARIN SODIUM 5000 UNITS: 5000 INJECTION INTRAVENOUS; SUBCUTANEOUS at 21:10

## 2022-04-24 RX ADMIN — ACETAMINOPHEN 325MG 650 MG: 325 TABLET ORAL at 23:55

## 2022-04-24 RX ADMIN — OXYCODONE HYDROCHLORIDE 5 MG: 5 TABLET ORAL at 17:09
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
## 2022-04-24 NOTE — PLAN OF CARE
Problem: Potential for Falls  Goal: Patient will remain free of falls  Description: INTERVENTIONS:  - Educate patient/family on patient safety including physical limitations  - Instruct patient to call for assistance with activity   - Consult OT/PT to assist with strengthening/mobility   - Keep Call bell within reach  - Keep bed low and locked with side rails adjusted as appropriate  - Keep care items and personal belongings within reach  - Initiate and maintain comfort rounds  - Make Fall Risk Sign visible to staff  - Offer Toileting every 2 Hours, in advance of need  - Initiate/Maintain  bed/chair alarm  - Obtain necessary fall risk management equipment: bedside commode, walker, gripper socks, bed alarm, call bell, rounds, bedpan  - Apply yellow socks and bracelet for high fall risk patients  - Consider moving patient to room near nurses station  4/24/2022 0754 by Ulises Chairez RN  Outcome: Progressing     Problem: MOBILITY - ADULT  Goal: Maintain or return to baseline ADL function  Description: INTERVENTIONS:  -  Assess patient's ability to carry out ADLs; assess patient's baseline for ADL function and identify physical deficits which impact ability to perform ADLs (bathing, care of mouth/teeth, toileting, grooming, dressing, etc )  - Assess/evaluate cause of self-care deficits   - Assess range of motion  - Assess patient's mobility; develop plan if impaired  - Assess patient's need for assistive devices and provide as appropriate  - Encourage maximum independence but intervene and supervise when necessary  - Involve family in performance of ADLs  - Assess for home care needs following discharge   - Consider OT consult to assist with ADL evaluation and planning for discharge  - Provide patient education as appropriate  4/24/2022 0754 by Ulises Chairez RN  Outcome: Progressing     Problem: PAIN - ADULT  Goal: Verbalizes/displays adequate comfort level or baseline comfort level  Description: Interventions:  - Encourage patient to monitor pain and request assistance  - Assess pain using appropriate pain scale  - Administer analgesics based on type and severity of pain and evaluate response  - Implement non-pharmacological measures as appropriate and evaluate response  - Consider cultural and social influences on pain and pain management  - Notify physician/advanced practitioner if interventions unsuccessful or patient reports new pain  4/24/2022 0754 by Francoise Murphy RN  Outcome: Progressing     Problem: INFECTION - ADULT  Goal: Absence or prevention of progression during hospitalization  Description: INTERVENTIONS:  - Assess and monitor for signs and symptoms of infection  - Monitor lab/diagnostic results  - Monitor all insertion sites  - Administer medications as ordered  - Instruct and encourage patient to use good hand hygiene technique  4/24/2022 0754 by Francoise Murphy RN  Outcome: Progressing     Problem: DISCHARGE PLANNING  Goal: Discharge to home or other facility with appropriate resources  Description: INTERVENTIONS:  - Identify barriers to discharge w/patient and caregiver  - Arrange for needed discharge resources and transportation as appropriate  - Identify discharge learning needs   - Refer to Case Management Department for coordinating discharge planning if the patient needs post-hospital services based on physician/advanced practitioner order or complex needs related to functional status, cognitive ability, or social support system  4/24/2022 0754 by Francoise Murphy RN  Outcome: Progressing     Problem: Knowledge Deficit  Goal: Patient/family/caregiver demonstrates understanding of disease process, treatment plan, medications, and discharge instructions  Description: Complete learning assessment and assess knowledge base    Interventions:  - Provide teaching at level of understanding  - Provide teaching via preferred learning methods  4/24/2022 0754 by Francoise Murphy RN  Outcome: Progressing     Problem: NEUROSENSORY - ADULT  Goal: Achieves stable or improved neurological status  Description: INTERVENTIONS  - Monitor and report changes in neurological status  - Monitor vital signs such as temperature, blood pressure, glucose, and any other labs ordered   - Monitor for seizure activity and implement precautions if appropriate      4/24/2022 0754 by Justice Doran RN  Outcome: Progressing     Problem: CARDIOVASCULAR - ADULT  Goal: Maintains optimal cardiac output and hemodynamic stability  Description: INTERVENTIONS:  - Monitor I/O, vital signs and rhythm  - Monitor for S/S and trends of decreased cardiac output  - Administer and titrate ordered vasoactive medications to optimize hemodynamic stability  - Assess quality of pulses, skin color and temperature  - Assess for signs of decreased coronary artery perfusion  - Instruct patient to report change in severity of symptoms  4/24/2022 0754 by Justice Doran RN  Outcome: Progressing  Goal: Absence of cardiac dysrhythmias or at baseline rhythm  Description: INTERVENTIONS:  - Continuous cardiac monitoring, vital signs, obtain 12 lead EKG if ordered  - Administer antiarrhythmic and heart rate control medications as ordered  - Monitor electrolytes and administer replacement therapy as ordered  4/24/2022 0754 by Justice Doran RN  Outcome: Progressing     Problem: RESPIRATORY - ADULT  Goal: Achieves optimal ventilation and oxygenation  Description: INTERVENTIONS:  - Assess for changes in respiratory status  - Assess for changes in mentation and behavior  - Position to facilitate oxygenation and minimize respiratory effort  - Oxygen administered by appropriate delivery if ordered  - Encourage broncho-pulmonary hygiene including cough, deep breathe, Incentive Spirometry  - Assess and instruct to report SOB or any respiratory difficulty  - Respiratory Therapy support as indicated  4/24/2022 0754 by Justice Doran RN  Outcome: Progressing     Problem: Prexisting or High Potential for Compromised Skin Integrity  Goal: Skin integrity is maintained or improved  Description: INTERVENTIONS:  - Identify patients at risk for skin breakdown  - Assess and monitor skin integrity  - Assess and monitor nutrition and hydration status  - Monitor labs   - Assess for incontinence   - Assist patient to turn and reposition every 2 hours while in bed  - Assist with mobility/ambulation  - Relieve pressure over bony prominences  - Avoid friction and shearing  - Provide appropriate hygiene as needed including keeping skin clean and dry  - Evaluate need for skin moisturizer/barrier cream  - Collaborate with interdisciplinary team   - Patient teaching  4/24/2022 0591 by Teodoro Bledsoe RN  Outcome: Progressing     Problem: COPING  Goal: Pt able to verbalize concerns and demonstrate effective coping strategies  Description: INTERVENTIONS:  - Assist patient to identify coping skills, available support systems and cultural and spiritual values  - Provide emotional support, including active listening and acknowledgement of concerns of patient and caregivers  - Reduce environmental stimuli, as able  - Provide patient education  - Assess for spiritual pain/suffering and initiate spiritual care, including notification of Pastoral Care or kyleigh based community as needed  - Assess effectiveness of coping strategies  Outcome: Progressing     Problem: CONFUSION/THOUGHT DISTURBANCE  Goal: Thought disturbances (confusion, delirium, depression, dementia or psychosis) are managed to maintain or return to baseline mental status and functional level  Description: INTERVENTIONS:  - Assess for possible contributors to  thought disturbance, including but not limited to medications, infection, impaired vision or hearing, underlying metabolic abnormalities, dehydration, respiratory compromise,  psychiatric diagnoses and notify attending PHYSICAN/AP  - Monitor and intervene to maintain adequate nutrition, hydration, elimination, sleep and activity  - Decrease environmental stimuli, including noise as appropriate  - Provide frequent contacts to provide refocusing, direction and reassurance as needed  Approach patient calmly with eye contact and at their level    - [place] high risk fall precautions, aspiration precautions and other safety measures, as indicated  - If delirium suspected, notify physician/AP of change in condition and request immediate in-person evaluation  - Pursue consults as appropriate including Geriatric (campus dependent), OT for cognitive evaluation/activity planning, psychiatric, pastoral care, etc   Outcome: Progressing

## 2022-04-24 NOTE — ASSESSMENT & PLAN NOTE
· Unwitnessed fall      · Patient states she tripped has had mechanical falls in the past  · 2D echo with ejection fraction 17%, type 1 diastolic dysfunction  · EEG pending  · PT recommending rehab, patient in agreement

## 2022-04-24 NOTE — PHYSICAL THERAPY NOTE
PHYSICAL THERAPY EVALUATION  NAME:  Fausto Card  DATE: 04/24/22    AGE:   78 y o  Mrn:   [id number]  ADMIT DX:  LAILA (acute kidney injury) ([facility] [address] ) [N17 9]  Acute respiratory failure with hypoxia ([facility] [address] ) [E91 96]  Acute metabolic encephalopathy [Y12 83]    Past Medical History:   Diagnosis Date    Anxiety     Asthma     Chronic pain     Depression     Enterovirus heart infection      Length Of Stay: 1  Performed at least 2 patient identifiers during session: Name and Birthday    PHYSICAL THERAPY EVALUATION :    04/24/22 0854   PT Last Visit   PT Visit Date 04/24/22   Note Type   Note type Evaluation   Pain Assessment   Pain Assessment Tool 0-10   Pain Score 8   Pain Location/Orientation Orientation: Left;Orientation: Lower; Location: Rib Cage   Pain Onset/Description Onset: Ongoing   Patient's Stated Pain Goal No pain   Hospital Pain Intervention(s) Medication (See MAR); Repositioned; Ambulation/increased activity   Restrictions/Precautions   Weight Bearing Precautions Per Order No   Other Precautions Impulsive;Cognitive; Chair Alarm; Bed Alarm;Multiple lines;O2;Fall Risk;Pain   Home Living   Type of [address] [place] [address] Two level; Other (Comment)  (Bedroom 2nd flr; 1st flr bath; 16 steps to 2nd; 1 TAB)   Bathroom Shower/Tub Walk-in shower   Bathroom Toilet Raised   Bathroom Equipment Grab bars in shower; Shower chair   [address] Other (Comment);Grab bars  (RW)   Prior Function   Level of [place] Independent with ADLs and functional mobility; Needs assistance with IADLs  (Daughter and brother help)   Lives With Alone   Receives Help From Family  (Son as well helps)   ADL Assistance Independent   IADLs Needs assistance   Falls in the last 6 months 1 to 4   Vocational Retired  (Retired RN)   General   Additional Pertinent History Patient is a 78 y o  female who presented to the emergency department with the provider documented the following: "In brief, this is 48-year-old female with history of chronic pain, bipolar disorder had a recent fall with multiple displaced rib fractures admitted to [facility] under trauma service from  through 3/8  Patient was also found to have a pneumothorax and liver contusion conservatively  Patient lives alone presented to the ED with fall at home  Patient does admit to taking an extra dose of lorazepam  According to patient's daughter patient has been confused and having hallucinations at home for the past 1 week  There is concern the patient is not able to take care of herself and not take her medications appropriately  Patient was given Narcan in the ED for somnolence"The patient is   She has 3 children 1 of whom is   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 lives alone  Family/Caregiver Present No   Cognition   Overall Cognitive Status WFL   Arousal/Participation Alert   Orientation Level Oriented X4   Memory Decreased recall of recent events   Following Commands Follows multistep commands with increased time or repetition   Comments Patient able to attend to conversation, but is easily distracted and perseverates on topics  Subjective   Subjective I am still grieving the loss of my son     RUE Assessment   RUE Assessment WFL   LUE Assessment   LUE Assessment WFL   RLE Assessment   RLE Assessment X   Strength RLE   R Hip Flexion 4-/5   R Knee Extension 4/5   R Ankle Dorsiflexion 4/5   LLE Assessment   LLE Assessment X   Strength LLE   L Hip Flexion 3/5   L Knee Extension 3+/5   L Ankle Dorsiflexion 4/5   Vision-Basic Assessment   Current Vision Wears glasses all the time   Coordination   Movements are Fluid and Coordinated 1   Sensation X  (numbness and tingle elb to hand and knee to foot)   Five Times Sit To Stand   Time (Seconds)   (30 Second STS: 2 with UE support)   Timed Up and Go   TUG Trial 1 (Seconds) 42 8 Seconds   TUG Trial 2 (Seconds) 42 8 Seconds   TUG Trial 3 (Seconds) 42 8 Seconds   TUG Average Score (Seconds) 42 8 Seconds   Bed Mobility   Rolling R 7  Independent   Additional items Bedrails   Rolling L 7  Independent   Additional items Bedrails   Supine to Sit 7  Independent   Additional items Bedrails   Sit to Supine 7  Independent   Additional items Bedrails   Transfers   Sit to Stand 5  Supervision   Additional items Verbal cues; Impulsive   Stand to Sit 5  Supervision   Additional items Verbal cues; Impulsive   Toilet transfer 5  Supervision   Additional items Impulsive;Verbal cues;Standard toilet   Ambulation/Elevation   Gait pattern Forward Flexion; Antalgic;Decreased L stance; Inconsistent anna; Short stride;Decreased hip extension;Decreased heel strike;Decreased toe off   Gait Assistance 4  Minimal assist   Additional items Assist x 1;Verbal cues   Assistive Device None   Distance 20'   Stair Management Assistance Not tested   Ambulation/Elevation Additional Comments Patient reports she does not use a device in the home but furniture walks  Patient's posture is kyphotic and used RW for additional gait trials with improved postural support  Use of the RW is recommended for increased safety      Balance   Static Sitting Fair   Dynamic Sitting Fair -   Static Standing Fair -   Dynamic Standing Poor   Ambulatory Poor   Endurance Deficit   Endurance Deficit Yes   Endurance Deficit Description Patients resting /73 HR 82 SpO2 97% post walk /86 HR 79 SpO2 96%   Activity Tolerance   Activity Tolerance Patient limited by pain; Patient limited by fatigue   Nurse Made Aware Spoke with Amber Simpson RN for clearance   Assessment   Prognosis Fair   Problem List Decreased strength;Decreased endurance; Impaired balance;Decreased mobility; Decreased coordination; Impaired judgement;Decreased safety awareness;Pain   Barriers to Discharge Decreased caregiver support   Barriers to Discharge Comments Patient has sustained 2 falls which have required medical attention in the past 2 months      Goals   Patient Goals To get stronger   STG Expiration Date 05/04/22   PT Treatment Day 1   Plan   Treatment/Interventions Functional transfer training;LE strengthening/ROM; Elevations; Therapeutic exercise; Endurance training;Cognitive reorientation;Patient/family training;Equipment eval/education;Gait training;Spoke to nursing   PT Frequency 3-5x/wk   Recommendation   PT Discharge Recommendation Post acute rehabilitation services   Equipment Recommended Tori Shah; Other (Comment)  (RW which she owns)   AM-PAC Basic Mobility Inpatient   Turning in Bed Without Bedrails 3   Lying on Back to Sitting on Edge of Flat Bed 4   Moving Bed to Chair 3   Standing Up From Chair 3   Walk in Room 3   Climb 3-5 Stairs 2   Basic Mobility Inpatient Raw Score 18   Basic Mobility Standardized Score 41 05   Highest Level Of Mobility   JH-HLM Goal 6: Walk 10 steps or more   JH-HLM Highest Level of Mobility 6: Walk 10 steps or more   JH-HLM Goal Achieved Yes   Tinetti   Sitting Balance 1   Arises 1   Attempts to Arise 1   Immediate Standing Balance (First 5 Seconds) 0   Standing Balance 1   Nudged 1   Eyes Closed 0   Turned 360 Degrees: Steadiness 0   Turned 360 Degrees: Continuity of Steps 0   Sitting Down 1   Balance Score 6   Initiation of Gait 0   Step Height: R Swing Foot 0   Step Length: R Swing Foot 1   Step Height: L Swing Foot 0   Step Length: L Swing Foot 1   Step Symmetry 1   Step Continuity 1   Path 1   Trunk 0   Walking Time 0   Gait Score 5   Total Score 11   Additional Treatment Session   Start Time 0905   End Time 0930   Treatment Assessment Patient seen for therapy session post evaluation to provide mobility and continued work on functional activity tolerance with monitoring of vitals with HR 79-82  Patient ambulated 21' x2 with and without RW  Gait quality better with RW and patient's safety adequate in the management of the RW which was a concern of staff due to variable cognition   Patient able to complete gait with Min A due to gait deviations as documented  Patient is impulsive and has decreased insight into safety  Patient's AM-PAC Basic Mobility Inpatient Short Form Raw Score is 18  A Raw score less than or equal to 16 suggests the patient may benefit from discharge to post-acute rehabilitation  Despite [facility] score patient is at high fall risk with TUG  of 42 8 seconds as well as a Tinetti score of 11  Patient would benefit from inpatient post acute rehab to stabilize her functional mobility and decrease her fall risk to avoid re-hospitaliztion  Exercises   Ankle Pumps Sitting;10 reps;Bilateral;AROM   Balance training  Standing dynamic Balance activities for 15 minutes   End of Consult   Patient Position at End of Consult Supine;Bed/Chair alarm activated; All needs within reach   End of Consult Comments Patient in stable condition at end of session ordering lunch  (Please find full objective findings from PT assessment regarding body systems outlined above)  Assessment: Pt is a 78 y o  female seen for PT evaluation s/p admit to [facility]  on 2022 w/ LAILA (acute kidney injury) ([facility] [address] )  History of chronic pain, bipolar disorder had a recent fall with multiple displaced rib fractures admitted to 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  Order placed for PT    Prior to admission, pt was independent w/ all functional mobility w/ rolling walker outside the home but no device in the home, ambulated community distances and elevations, lived in multi-level home and lived alone with the support of her family  Upon evaluation: Pt requires no assistance for bed mobility with HOB elevated and bedrails, supervision assistance for transfers and minimal of 1 assistance for ambulation with rolling walker and with no device household distances  Pt's clinical presentation is currently unstable/unpredictable given the functional mobility deficits above, especially weakness, gait deviations, pain, decreased activity tolerance, decreased safety awareness and impaired judgement, coupled with fall risks including hx of falls, impulsivity, impaired balance, impaired judgement, decreased safety awareness and altered vision, and combined with medical complications of hypertension , abnormal renal lab values, abnormal H&H, multiple readmissions and impulsivity during admission  Pt to benefit from continued skilled PT tx while in hospital and upon DC to address deficits as defined above and maximize level of functional mobility  Patients AM-PAC Basic Mobility Inpatient Short Form Raw Score is 18  A Raw score less than or equal to 16 suggests the patient may benefit from discharge to post-acute rehabilitation  From PT/mobility standpoint, recommendation at time of d/c would be inpatient rehab pending progress in order to maximize pt's functional independence and consistency w/ mobility in order to facilitate return to PLOF  Recommend trial with walker next 1-2 sessions, ther ex next 1-2 sessions, OT consult and case management consult  Goals:  In 10 days pt will demonstrate: bed mobility (I) for home function OOB, sit<>stand and functional transfers mod (I) w/ RW for home function, gait training 100ft mod (I) w/ RW for home distances, 1 step mod (I) w/ RW for home entrance, improve BLE by 1/2 grade strength to optimize functional mobility, improve balance by 1 grade to decrease fall risk, improve activity tolerance to >45 minutes w/o rest to improve functional endurance for home, pt and family education on PT risk, role, benefits, POC, goals, and recommendations to optimize patient outcomes, patient functional, optimize LOS and promote discharge to least restrictive environment  The following objective measures were performed on IE: Timed Up and Go: 42 8 seconds (high risk for falls), Tinetti/OLIVER: 11/28 (high risk for falls) and AM-PAC 6-Clicks: 93/99  Comorbidities affecting pt's physical performance at time of assessment include: Chronic pain and anciety, depression  Personal factors affecting pt at time of IE include: hx of non-compliance, depression, multi-level environment, limited home support, behavioral pattern, inability to perform IADLs, inability to navigate community distances and recent fall(s)       Norman Link, PT, DPT, GCS

## 2022-04-24 NOTE — PLAN OF CARE
Problem: Potential for Falls  Goal: Patient will remain free of falls  Description: INTERVENTIONS:  - Educate patient/family on patient safety including physical limitations  - Instruct patient to call for assistance with activity   - Consult OT/PT to assist with strengthening/mobility   - Keep Call bell within reach  - Keep bed low and locked with side rails adjusted as appropriate  - Keep care items and personal belongings within reach  - Initiate and maintain comfort rounds  - Make Fall Risk Sign visible to staff  - Offer Toileting every 2 Hours, in advance of need  - Initiate/Maintain  bed/chair alarm  - Obtain necessary fall risk management equipment: bedside commode, walker, gripper socks, bed alarm, call bell, rounds, bedpan  - Apply yellow socks and bracelet for high fall risk patients  - Consider moving patient to room near nurses station  Outcome: Progressing     Problem: MOBILITY - ADULT  Goal: Maintain or return to baseline ADL function  Description: INTERVENTIONS:  -  Assess patient's ability to carry out ADLs; assess patient's baseline for ADL function and identify physical deficits which impact ability to perform ADLs (bathing, care of mouth/teeth, toileting, grooming, dressing, etc )  - Assess/evaluate cause of self-care deficits   - Assess range of motion  - Assess patient's mobility; develop plan if impaired  - Assess patient's need for assistive devices and provide as appropriate  - Encourage maximum independence but intervene and supervise when necessary  - Involve family in performance of ADLs  - Assess for home care needs following discharge   - Consider OT consult to assist with ADL evaluation and planning for discharge  - Provide patient education as appropriate  Outcome: Progressing     Problem: PAIN - ADULT  Goal: Verbalizes/displays adequate comfort level or baseline comfort level  Description: Interventions:  - Encourage patient to monitor pain and request assistance  - Assess pain using appropriate pain scale  - Administer analgesics based on type and severity of pain and evaluate response  - Implement non-pharmacological measures as appropriate and evaluate response  - Consider cultural and social influences on pain and pain management  - Notify physician/advanced practitioner if interventions unsuccessful or patient reports new pain  Outcome: Progressing     Problem: INFECTION - ADULT  Goal: Absence or prevention of progression during hospitalization  Description: INTERVENTIONS:  - Assess and monitor for signs and symptoms of infection  - Monitor lab/diagnostic results  - Monitor all insertion sites  - Administer medications as ordered  - Instruct and encourage patient to use good hand hygiene technique  Outcome: Progressing     Problem: DISCHARGE PLANNING  Goal: Discharge to home or other facility with appropriate resources  Description: INTERVENTIONS:  - Identify barriers to discharge w/patient and caregiver  - Arrange for needed discharge resources and transportation as appropriate  - Identify discharge learning needs   - Refer to Case Management Department for coordinating discharge planning if the patient needs post-hospital services based on physician/advanced practitioner order or complex needs related to functional status, cognitive ability, or social support system  Outcome: Progressing     Problem: Knowledge Deficit  Goal: Patient/family/caregiver demonstrates understanding of disease process, treatment plan, medications, and discharge instructions  Description: Complete learning assessment and assess knowledge base    Interventions:  - Provide teaching at level of understanding  - Provide teaching via preferred learning methods  Outcome: Progressing     Problem: CARDIOVASCULAR - ADULT  Goal: Maintains optimal cardiac output and hemodynamic stability  Description: INTERVENTIONS:  - Monitor I/O, vital signs and rhythm  - Monitor for S/S and trends of decreased cardiac output  - Administer and titrate ordered vasoactive medications to optimize hemodynamic stability  - Assess quality of pulses, skin color and temperature  - Assess for signs of decreased coronary artery perfusion  - Instruct patient to report change in severity of symptoms  Outcome: Progressing  Goal: Absence of cardiac dysrhythmias or at baseline rhythm  Description: INTERVENTIONS:  - Continuous cardiac monitoring, vital signs, obtain 12 lead EKG if ordered  - Administer antiarrhythmic and heart rate control medications as ordered  - Monitor electrolytes and administer replacement therapy as ordered  Outcome: Progressing     Problem: RESPIRATORY - ADULT  Goal: Achieves optimal ventilation and oxygenation  Description: INTERVENTIONS:  - Assess for changes in respiratory status  - Assess for changes in mentation and behavior  - Position to facilitate oxygenation and minimize respiratory effort  - Oxygen administered by appropriate delivery if ordered  - Encourage broncho-pulmonary hygiene including cough, deep breathe, Incentive Spirometry  - Assess and instruct to report SOB or any respiratory difficulty  - Respiratory Therapy support as indicated  Outcome: Progressing     Problem: Prexisting or High Potential for Compromised Skin Integrity  Goal: Skin integrity is maintained or improved  Description: INTERVENTIONS:  - Identify patients at risk for skin breakdown  - Assess and monitor skin integrity  - Assess and monitor nutrition and hydration status  - Monitor labs   - Assess for incontinence   - Assist patient to turn and reposition every 2 hours while in bed  - Assist with mobility/ambulation  - Relieve pressure over bony prominences  - Avoid friction and shearing  - Provide appropriate hygiene as needed including keeping skin clean and dry  - Evaluate need for skin moisturizer/barrier cream  - Collaborate with interdisciplinary team   - Patient teaching  Outcome: Progressing     Problem: COPING  Goal: Pt able to verbalize concerns and demonstrate effective coping strategies  Description: INTERVENTIONS:  - Assist patient to identify coping skills, available support systems and cultural and spiritual values  - Provide emotional support, including active listening and acknowledgement of concerns of patient and caregivers  - Reduce environmental stimuli, as able  - Provide patient education  - Assess for spiritual pain/suffering and initiate spiritual care, including notification of Pastoral Care or kyleigh based community as needed  - Assess effectiveness of coping strategies  Outcome: Progressing

## 2022-04-24 NOTE — PLAN OF CARE
Problem: PHYSICAL THERAPY ADULT  Goal: Performs mobility at highest level of function for planned discharge setting  See evaluation for individualized goals  Description: Treatment/Interventions: Functional transfer training,LE strengthening/ROM,Elevations,Therapeutic exercise,Endurance training,Cognitive reorientation,Patient/family training,Equipment eval/education,Gait training,Spoke to nursing  Equipment Recommended: Andres Eagle (Comment) (RW which she owns)       See flowsheet documentation for full assessment, interventions and recommendations  Note: Prognosis: Fair  Problem List: Decreased strength,Decreased endurance,Impaired balance,Decreased mobility,Decreased coordination,Impaired judgement,Decreased safety awareness,Pain  Assessment: Pt is a 78 y o  female seen for PT evaluation s/p admit to [address] on 2022 w/ LAILA (acute kidney injury) ([facility] [address] )  History of chronic pain, bipolar disorder had a recent fall with multiple displaced rib fractures admitted to [facility] under trauma service from  through 3/8  Patient was also found to have a pneumothorax and liver contusion conservatively  Patient lives alone presented to the ED with fall at home  Patient does admit to taking an extra dose of lorazepam  According to patient's daughter patient has been confused and having hallucinations at home for the past 1 week  There is concern the patient is not able to take care of herself and not take her medications appropriately  Patient was given Narcan in the ED for somnolence"The patient is   She has 3 children 1 of whom is   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 lives alone  Order placed for PT  Prior to admission, pt was independent w/ all functional mobility w/ rolling walker outside the home but no device in the home, ambulated community distances and elevations, lived in multi-level home and lived alone with the support of her family   Upon evaluation: Pt requires no assistance for bed mobility with HOB elevated and bedrails, supervision assistance for transfers and minimal of 1 assistance for ambulation with rolling walker and with no device household distances  Pt's clinical presentation is currently unstable/unpredictable given the functional mobility deficits above, especially weakness, gait deviations, pain, decreased activity tolerance, decreased safety awareness and impaired judgement, coupled with fall risks including hx of falls, impulsivity, impaired balance, impaired judgement, decreased safety awareness and altered vision, and combined with medical complications of hypertension , abnormal renal lab values, abnormal H&H, multiple readmissions and impulsivity during admission  Pt to benefit from continued skilled PT tx while in hospital and upon DC to address deficits as defined above and maximize level of functional mobility  Patients AM-PAC Basic Mobility Inpatient Short Form Raw Score is 18  A Raw score less than or equal to 16 suggests the patient may benefit from discharge to post-acute rehabilitation  From PT/mobility standpoint, recommendation at time of d/c would be inpatient rehab pending progress in order to maximize pt's functional independence and consistency w/ mobility in order to facilitate return to PLOF  Recommend trial w  Barriers to Discharge: Decreased caregiver support  Barriers to Discharge Comments: Patient has sustained 2 falls which have required medical attention in the past 2 months  PT Discharge Recommendation: Post acute rehabilitation services          See flowsheet documentation for full assessment

## 2022-04-24 NOTE — ASSESSMENT & PLAN NOTE
· Suspected by ams and lethargy w/hypoxia and hypercapnia responsive to narcan and fall w/recent admission for fall resultant in multiple broken ribs and liver contusion  · Geriatric consultation and recommendations appreciated

## 2022-04-24 NOTE — CASE MANAGEMENT
Case Management Assessment & Discharge Planning Note    Patient name El Goins  Location [address] [address] [address]-* MRN [id number]  : 1955 Date 2022       Current Admission Date: 2022  Current Admission Diagnosis:Acute metabolic encephalopathy   Patient Active Problem List    Diagnosis Date Noted    LAILA (acute kidney injury) ([facility] [address] ) 2022    Polypharmacy 2022    'light-for-dates' infant with signs of fetal malnutrition 2022    Bipolar depression ([facility] [address] ) 2022    Rheumatoid arthritis ([facility] [address] ) 2022    Closed fracture of multiple ribs of right side 2022    Traumatic pneumothorax 2022    Closed fracture of transverse process of lumbar vertebra ([facility] [address] ) 2022    Liver contusion 2022    Fall 2022    Acute pain due to trauma 2022    Right shoulder pain 2022    Severe protein-calorie malnutrition ([address] ) 2022    Undifferentiated connective tissue disease ([facility] [address] ) 2021    Primary generalized (osteo)arthritis 2021    Peripheral neuropathy 2021    Senile osteoporosis 2021    Hx of compression fracture of spine 2021    Lumbar spondylosis 2021    Cervical spondylosis 2021    Anemia 2021    Chronic pain syndrome     Asthma     Ambulatory dysfunction     Acute metabolic encephalopathy     Acute cystitis without hematuria     Seronegative arthropathy of multiple sites ([facility] [address] )     Gastroesophageal reflux disease without esophagitis     Mixed stress and urge urinary incontinence     CHF (congestive heart failure) ([facility])       LOS (days): 1  Geometric Mean LOS (GMLOS) (days):   Days to GMLOS:     OBJECTIVE:    Risk of Unplanned Readmission Score: 19         Current admission status: Inpatient       Preferred Pharmacy:   [address], [address]  [address] [facility] [address]  [place] [address] [address]  Phone: [phone] Fax: [phone]    Primary Care Provider: Margurite Opitz, DO    Primary Insurance: MEDICARE  Secondary Insurance: AARP    ASSESSMENT:  Active Health Care Proxies    There are no active Health Care Proxies on file  Readmission Root Cause  30 Day Readmission: No    Patient Information  Admitted from[de-identified] Home  Mental Status: Alert  During Assessment patient was accompanied by: Not accompanied during assessment  Assessment information provided by[de-identified] Patient,Daughter  Primary Caregiver: Self  Support Systems: [address] of Residence: [address] [facility] do you live in?: [address] [place] [address] entry access options  Select all that apply : Stairs  Number of steps to enter home  : 1  Do the steps have railings?: No  Type of Current Residence: 2 story home  Upon entering residence, is there a bedroom on the main floor (no further steps)?: No  A bedroom is located on the following floor levels of residence (select all that apply):: 2nd Floor  Upon entering residence, is there a bathroom on the main floor (no further steps)?: No  Indicate which floors of current residence have a bathroom (select all the apply):: 2nd Floor  Number of steps to 2nd floor from main floor: One Flight  Living Arrangements: Lives Alone    Activities of Daily Living Prior to Admission  Functional Status: Assistance  Completes ADLs independently?: No  Level of ADL dependence: Assistance  Ambulates independently?: Yes  Does patient use assisted devices?: No  Does patient currently own DME?: Yes  What DME does the patient currently own?: Suzie Barahona  Does patient have a history of Outpatient Therapy (PT/OT)?: No  Does the patient have a history of Short-Term Rehab?: Yes [facility])  Does patient have a history of HHC?: No  Does patient currently have [facility]?: No         Patient Information Continued  Income Source: Pension/senior care  Does patient have prescription coverage?: Yes  Does patient receive dialysis treatments?: No  Does patient have a history of substance abuse?: No  Does patient have a history of Mental Health Diagnosis?: No         Means of Transportation  Means of Transport to Appts[de-identified] Drives Self        DISCHARGE DETAILS:    Discharge planning discussed with[de-identified] patient and daughter  Freedom of Choice: Yes  Comments - Freedom of Choice: STR rec-recently was at [facility] so would like referral placed back there  CM contacted family/caregiver?: Yes  Were Treatment Team discharge recommendations reviewed with patient/caregiver?: Yes  Did patient/caregiver verbalize understanding of patient care needs?: Yes  Were patient/caregiver advised of the risks associated with not following Treatment Team discharge recommendations?: Yes    Contacts  Patient Contacts: Nobles Alexi  Relationship to Patient[de-identified] Family  Contact Method: Phone  Phone Number: [phone]  Reason/Outcome: Continuity of [address]         Is the patient interested in [address] at discharge?: No    DME Referral Provided  Referral made for DME?: No    Other Referral/Resources/Interventions Provided:  Interventions: Short Term Rehab         Treatment Team Recommendation: Short Term Rehab  Discharge Destination Plan[de-identified] Short Term Rehab  Transport at Discharge : [address] spoke with patient at bedside to discuss STR rec  Patient recently at [facility] after fall resulting in multiple rib fractures  She was discharged to [facility] on 3/8/22  She is okay with going back there for STR  She is agreeable to CM sending referral to [facility]  CM offered to contact her daughter and she is agreeable  CM called patient's daughter, Holland Jessenia at [phone]  She is agreeable to referral being placed to [facility]  CM offered to send additional SNF referrals in case [facility] does not have a bed available and she is agreeable  She asked that patient get into a single room   CM informed her that most facilities place patients in with a roommate unless the individual needs single room for isolation  CM informed her that most facilities request payment for single rooms, however, CM will ask for single room  CM placed referral to [facility] along with other SNFs as back up asking for single room

## 2022-04-24 NOTE — PROGRESS NOTES
[address] [facility]  Progress Note - Anisha Gibson 1955, 78 y o  female MRN: [id number]  Unit/Bed#: E2 -01 Encounter: [id number]  Primary Care Provider: Toshia Beckham DO   Date and time admitted to hospital: 4/22/2022  9:30 PM    * Acute metabolic encephalopathy  Assessment & Plan  this is 25-year-old female with history of chronic pain, bipolar disorder had a recent fall with multiple displaced rib fractures admitted to [facility] under trauma service from 03/01 through 3/8  Patient was also found to have a pneumothorax and liver contusion conservatively  Patient lives alone presented to the ED with fall at home  Patient does admit to taking an extra dose of lorazepam  According to patient's daughter patient has been confused and having hallucinations at home for the past 1 week  There is concern the patient is not able to take care of herself and not take her medications appropriately  Patient was given Narcan in the ED for somnolence    · Likely secondary to medication noncompliance and abuse  · Patient awake and alert oriented x4  · Still having episodes of hallucinations  · CT head and neck and CT spine negative for any acute abnormality  · Continue to monitor    [facility] )  Assessment & Plan  · Likely secondary to poor p o   Intake  · Improved with IV fluids  · Monitor renal function    Polypharmacy  Assessment & Plan  · Suspected by ams and lethargy w/hypoxia and hypercapnia responsive to narcan and fall w/recent admission for fall resultant in multiple broken ribs and liver contusion  · Geriatric consultation and recommendations appreciated    Rheumatoid arthritis ([facility] [address] )  Assessment & Plan  · Continue hydroxychloroquine    Bipolar depression ([facility] [address] )  Assessment & Plan  · Continue lamictal/wellbutrin  · Daughter had concerned the patient is not taking medications appropriately and is having hallucinations  · Psychiatry consultation appreciated  · Recommended to continue current regimen and follow closely outpatient    Severe protein-calorie malnutrition ([address] )  Assessment & Plan  Malnutrition Findings:     Consult nutrition    BMI Findings: Body mass index is 24 2 kg/m²  Fall  Assessment & Plan  · Unwitnessed fall  · Patient states she tripped has had mechanical falls in the past  · 2D echo with ejection fraction 23%, type 1 diastolic dysfunction  · EEG pending  · PT recommending rehab, patient in agreement        VTE Pharmacologic Prophylaxis:   Pharmacologic:  Heparin    Patient Centered Rounds: I have performed bedside rounds with nursing staff today  Education and Discussions with Family / Patient: LM with daughter, Marlys Sesay    Time Spent for Care: 20 minutes  More than 50% of total time spent on counseling and coordination of care as described above  Current Length of Stay: 1 day(s)    Current Patient Status: Inpatient   Certification Statement: The patient will continue to require additional inpatient hospital stay due to Fall, rehab    Discharge Plan / Estimated Discharge Date: TBD    Code Status: Level 1 - Full Code      Subjective:   Patient seen and examined at bedside, currently denies any abdominal pain, chest pain, nausea, vomiting    Objective:     Vitals:   Temp (24hrs), Av 9 °F (36 6 °C), Min:97 2 °F (36 2 °C), Max:98 9 °F (37 2 °C)    Temp:  [97 2 °F (36 2 °C)-98 9 °F (37 2 °C)] 97 2 °F (36 2 °C)  HR:  [79-88] 86  Resp:  [18-20] 18  BP: (116-141)/(62-73) 136/71  SpO2:  [95 %-99 %] 95 %  Body mass index is 24 2 kg/m²  Input and Output Summary (last 24 hours):        Intake/Output Summary (Last 24 hours) at 2022 1314  Last data filed at 2022 0615  Gross per 24 hour   Intake 1097 5 ml   Output 1800 ml   Net -702 5 ml       Physical Exam:    Constitutional: Patient is oriented to person, place and time, no acute distress, frail appearing  HEENT:  Normocephalic, atraumatic  Cardiovascular: Normal S1S2, RRR, No murmurs/rubs/gallops appreciated  Pulmonary:  Bilateral air entry, No rhonchi/rales/wheezing appreciated  Abdominal: Soft, Bowel sounds present, Non-tender, Non-distended  Extremities:  No cyanosis, clubbing or edema  Neurological: Cranial nerves II-XII grossly intact, sensation intact, otherwise no focal neurological symptoms  Skin:  Warm, dry    Additional Data:     Labs:    Results from last 7 days   Lab Units 04/24/22  0441   WBC Thousand/uL 4 56   HEMOGLOBIN g/dL 9 1*   HEMATOCRIT % 29 4*   PLATELETS Thousands/uL 133*   NEUTROS PCT % 65   LYMPHS PCT % 19   MONOS PCT % 14*   EOS PCT % 2     Results from last 7 days   Lab Units 04/24/22  0441 04/23/22  0544 04/22/22 2208   POTASSIUM mmol/L 3 5   < > 4 5   CHLORIDE mmol/L 107   < > 103   CO2 mmol/L 24   < > 25   BUN mg/dL 12   < > 35*   CREATININE mg/dL 1 04   < > 2 68*   CALCIUM mg/dL 8 8   < > 9 7   ALK PHOS U/L  --   --  95   ALT U/L  --   --  19   AST U/L  --   --  24    < > = values in this interval not displayed  Results from last 7 days   Lab Units 04/22/22  2208   INR  1 12        I Have Reviewed All Lab Data Listed Above  Recent Cultures (last 7 days):     Results from last 7 days   Lab Units 04/22/22 2208   BLOOD CULTURE  No Growth at 24 hrs  No Growth at 24 hrs         Last 24 Hours Medication List:   Current Facility-Administered Medications   Medication Dose Route Frequency Provider Last Rate    acetaminophen  650 mg Oral Q6H PRN Skyler Lozano PA-C      buPROPion  450 mg Oral Daily Lianet Marroquin PA-C      heparin (porcine)  5,000 Units Subcutaneous [facility] Lianet Marroquin PA-C      hydroxychloroquine  200 mg Oral BID Lianet Marroquin PA-C      lamoTRIgine  150 mg Oral Daily Lianet Marroquin PA-C      LORazepam  0 5 mg Intravenous Q6H PRN Lianet Marroquin PA-C      ondansetron  4 mg Intravenous Q6H PRN Lianet Marroquin PA-C      oxyCODONE  5 mg Oral Q4H PRN Skyler Lozano PA-C      pantoprazole  40 mg Oral Early Morning Lianet Tracey PA-C          Today, Patient Was Seen By: Merrill Tavera MD

## 2022-04-24 NOTE — ASSESSMENT & PLAN NOTE
· Continue lamictal/wellbutrin  · Daughter had concerned the patient is not taking medications appropriately and is having hallucinations  · Psychiatry consultation appreciated  · Recommended to continue current regimen and follow closely outpatient

## 2022-04-25 ENCOUNTER — APPOINTMENT (INPATIENT)
Dept: NEUROLOGY | Facility: HOSPITAL | Age: 67
DRG: 682 | End: 2022-04-25
Payer: MEDICARE

## 2022-04-25 PROCEDURE — 99222 1ST HOSP IP/OBS MODERATE 55: CPT | Performed by: NURSE PRACTITIONER

## 2022-04-25 PROCEDURE — 0241U HB NFCT DS VIR RESP RNA 4 TRGT: CPT | Performed by: STUDENT IN AN ORGANIZED HEALTH CARE EDUCATION/TRAINING PROGRAM

## 2022-04-25 PROCEDURE — 97116 GAIT TRAINING THERAPY: CPT

## 2022-04-25 PROCEDURE — 99232 SBSQ HOSP IP/OBS MODERATE 35: CPT | Performed by: STUDENT IN AN ORGANIZED HEALTH CARE EDUCATION/TRAINING PROGRAM

## 2022-04-25 PROCEDURE — 97530 THERAPEUTIC ACTIVITIES: CPT

## 2022-04-25 PROCEDURE — 97166 OT EVAL MOD COMPLEX 45 MIN: CPT

## 2022-04-25 RX ORDER — POTASSIUM CHLORIDE 20 MEQ/1
40 TABLET, EXTENDED RELEASE ORAL ONCE
Status: COMPLETED | OUTPATIENT
Start: 2022-04-25 | End: 2022-04-25

## 2022-04-25 RX ORDER — MORPHINE SULFATE 15 MG/1
15 TABLET, FILM COATED, EXTENDED RELEASE ORAL EVERY 12 HOURS SCHEDULED
Status: DISCONTINUED | OUTPATIENT
Start: 2022-04-25 | End: 2022-04-26 | Stop reason: HOSPADM

## 2022-04-25 RX ORDER — MORPHINE SULFATE 15 MG/1
15 TABLET, FILM COATED, EXTENDED RELEASE ORAL 2 TIMES DAILY
COMMUNITY

## 2022-04-25 RX ADMIN — MORPHINE SULFATE 15 MG: 15 TABLET, EXTENDED RELEASE ORAL at 11:52

## 2022-04-25 RX ADMIN — ACETAMINOPHEN 325MG 650 MG: 325 TABLET ORAL at 16:02

## 2022-04-25 RX ADMIN — LORAZEPAM 0.5 MG: 2 INJECTION INTRAMUSCULAR; INTRAVENOUS at 07:41

## 2022-04-25 RX ADMIN — BUPROPION HYDROCHLORIDE 450 MG: 150 TABLET, FILM COATED, EXTENDED RELEASE ORAL at 08:48

## 2022-04-25 RX ADMIN — HEPARIN SODIUM 5000 UNITS: 5000 INJECTION INTRAVENOUS; SUBCUTANEOUS at 05:57

## 2022-04-25 RX ADMIN — LORAZEPAM 0.5 MG: 2 INJECTION INTRAMUSCULAR; INTRAVENOUS at 16:02

## 2022-04-25 RX ADMIN — LAMOTRIGINE 150 MG: 100 TABLET ORAL at 08:48

## 2022-04-25 RX ADMIN — HEPARIN SODIUM 5000 UNITS: 5000 INJECTION INTRAVENOUS; SUBCUTANEOUS at 13:16

## 2022-04-25 RX ADMIN — ONDANSETRON 4 MG: 2 INJECTION INTRAMUSCULAR; INTRAVENOUS at 20:45

## 2022-04-25 RX ADMIN — HEPARIN SODIUM 5000 UNITS: 5000 INJECTION INTRAVENOUS; SUBCUTANEOUS at 21:36

## 2022-04-25 RX ADMIN — OXYCODONE HYDROCHLORIDE 5 MG: 5 TABLET ORAL at 03:05

## 2022-04-25 RX ADMIN — MORPHINE SULFATE 15 MG: 15 TABLET, EXTENDED RELEASE ORAL at 21:36

## 2022-04-25 RX ADMIN — PANTOPRAZOLE SODIUM 40 MG: 40 TABLET, DELAYED RELEASE ORAL at 05:57

## 2022-04-25 RX ADMIN — POTASSIUM CHLORIDE 40 MEQ: 20 TABLET, EXTENDED RELEASE ORAL at 08:48

## 2022-04-25 RX ADMIN — ACETAMINOPHEN 325MG 650 MG: 325 TABLET ORAL at 09:55

## 2022-04-25 RX ADMIN — HYDROXYCHLOROQUINE SULFATE 200 MG: 200 TABLET, FILM COATED ORAL at 08:51

## 2022-04-25 RX ADMIN — HYDROXYCHLOROQUINE SULFATE 200 MG: 200 TABLET, FILM COATED ORAL at 17:02

## 2022-04-25 RX ADMIN — ONDANSETRON 4 MG: 2 INJECTION INTRAMUSCULAR; INTRAVENOUS at 09:00

## 2022-04-25 NOTE — ASSESSMENT & PLAN NOTE
Continue follow-up pain management outpatient  PDMP reviewed  Continue MS Contin 15 mg ER b i d , oxycodone p r n

## 2022-04-25 NOTE — PLAN OF CARE
Problem: OCCUPATIONAL THERAPY ADULT  Goal: Performs self-care activities at highest level of function for planned discharge setting  See evaluation for individualized goals  Description: Treatment Interventions: ADL retraining,UE strengthening/ROM,Endurance training,Cognitive reorientation,Patient/family training,Equipment evaluation/education,Continued evaluation,Activityengagement     See flowsheet documentation for full assessment, interventions and recommendations  Note: Limitation: Decreased ADL status,Decreased UE strength,Decreased Safe judgement during ADL,Decreased cognition,Decreased endurance,Decreased high-level ADLs  Prognosis: Fair  Assessment: Pt is a 77 Y/O female admitted to the hospital 2* a fall in the home CT head and neck and CT spine negative for any acute abnormality  Pt was recently hospitalized at [facility] as of 03/22 d/t a recent fall which resulted in multiple displaced rib fx, w/o surgical intervention, liver contusion and lumbar spine transverse process fx bracing deferred 2* rib fx  Pt primary diagnosis of LAILA likely 2* decreased PO intake, acute metabolic encephalopathy  Pt with PMH anxiety, asthma, chronic pain  PTA pt states independence with ADL status, transfers and functional mobility with use of cane prn, + +falls  During intital eval pt required min A for ADL'S and transfers, and Mod A for functional mobility  Pt demonstrated deficits with functional mobility, functional balance, transfer safety, cognition (IE  awareness, precautions), activity tolerance, and decreased ADL status  Pt would benefit from OT treatment 3-5x a week for 1-2weeks   OT recommending STR at d/c      OT Discharge Recommendation: Post acute rehabilitation services  OT - OK to Discharge: Yes (To rehab when medically cleared )
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
## 2022-04-25 NOTE — OCCUPATIONAL THERAPY NOTE
Occupational Therapy Evaluation (time= 1253-3225)     Patient Name: Rodriguez ROBERT Date: 4/25/2022  Problem List  Principal Problem:    Acute metabolic encephalopathy  Active Problems:    Chronic pain syndrome    Fall    Severe protein-calorie malnutrition ([facility] )    Bipolar depression ([facility] )    Rheumatoid arthritis ([facility] )    [facility]    Polypharmacy    Past Medical History  Past Medical History:   Diagnosis Date    Anxiety     Asthma     Chronic pain     Depression     Enterovirus heart infection      Past Surgical History  Past Surgical History:   Procedure Laterality Date    FIXATION KYPHOPLASTY LUMBAR SPINE      HYSTERECTOMY      REPAIR RECTOCELE             04/25/22 0831   OT Last Visit   OT Visit Date 04/25/22   Note Type   Note type Evaluation   Restrictions/Precautions   Weight Bearing Precautions Per Order No   Other Precautions Cognitive; Chair Alarm; Bed Alarm; Impulsive   Pain Assessment   Pain Assessment Tool 0-10   Pain Score 8   Pain Location/Orientation Orientation: Left; Location: Abdomen;Orientation: Lower   Hospital Pain Intervention(s) Repositioned   Home Living   Type of [address] [place] [address] Two level  (Bedroom 2nd floor, 1st floor bath, 16 steps to 2nd, 1STE)   Bathroom Shower/Tub Walk-in shower   Bathroom Toilet Raised   Bathroom Equipment Shower chair;Grab bars in [address]  (RW)   Prior Function   Level of [place] Independent with ADLs and functional mobility; Needs assistance with ADLs and functional mobility   Lives With Alone   Receives Help From Family   ADL Assistance Independent   IADLs Needs assistance  (Receives assist from daughter/brother)   Falls in the last 6 months 1 to 4   Vocational Retired   Lifestyle   Autonomy PTA pt states independence with ADL status, transfers and functional mobility with use of a cane prn  +, +Falls      Reciprocal Relationships Children, Grandchildren   Service to Others Retired RN Intrinsic Gratification Spending time with grandchildren   Psychosocial   Psychosocial (WDL) WDL   Subjective   Subjective "I slept for 10 minutes last night "    ADL   Where Assessed Chair   Eating Assistance 5  Supervision/Setup   Grooming Assistance 5  Supervision/Setup   UB Bathing Assistance 5  Supervision/Setup   LB Bathing Assistance 4  Minimal Assistance   [address] 5  Supervision/Setup   LB Dressing Assistance 4  [address]  4  Minimal Assistance   Bed Mobility   Supine to Sit Unable to assess   Sit to Supine Unable to assess   Additional Comments Pt seated OOB in chair at end of session, chair alarm activated    Transfers   Sit to Stand 4  Minimal assistance   Additional items Assist x 1;Verbal cues   Stand to Sit 4  Minimal assistance   Additional items Assist x 1;Verbal cues   Functional Mobility   Functional Mobility 3  Moderate assistance   Additional Comments   (x1)   Additional items Rolling walker   Balance   Static Sitting Fair +   Dynamic Sitting Fair   Static Standing Fair -   Dynamic Standing Poor +   Ambulatory Poor   Activity Tolerance   Activity Tolerance Patient limited by pain   Medical Staff Made Aware Nsg   RUE Assessment   RUE Assessment WFL   RUE Strength   R Shoulder Flexion 3+/5   R Shoulder Extension 4/5   RUE Overall Strength Within Functional Limits - able to perform ADL tasks with strength  (4/5 throughout except SH flexion)   LUE Assessment   LUE Assessment WFL   LUE Strength   LUE Overall Strength Within Functional Limits - able to perform ADL tasks with strength  (4/5 Throughout)   Hand Function   Gross Motor Coordination Functional   Fine Motor Coordination Functional  (Finger Flexion 3+/5)   Sensation   Light Touch No apparent deficits  (pt reports loss of feeling, at B/L wrists at baseline)   Proprioception   Proprioception No apparent deficits   Vision-Basic Assessment   Current Vision Wears glasses all the time   Vision - Complex Assessment   Acuity Able to read clock/calendar on wall without difficulty   Perception   Inattention/Neglect Appears intact   Cognition   Overall Cognitive Status Impaired   Arousal/Participation Alert; Cooperative   Attention Within functional limits   Orientation Level Oriented X4   Memory Within functional limits   Following Commands Follows one step commands without difficulty   Comments Impulsive at times, limited insight into deficits    Assessment   Limitation Decreased ADL status; Decreased UE strength;Decreased Safe judgement during ADL;Decreased cognition;Decreased endurance;Decreased high-level ADLs   Prognosis Fair   Assessment Pt is a 79 Y/O female admitted to the hospital 2* a fall in the home CT head and neck and CT spine negative for any acute abnormality  Pt was recently hospitalized at [facility] as of 03/22 d/t a recent fall which resulted in multiple displaced rib fx, w/o surgical intervention, liver contusion and lumbar spine transverse process fx bracing deferred 2* rib fx  Pt primary diagnosis of LAILA likely 2* decreased PO intake, acute metabolic encephalopathy  Pt with PMH anxiety, asthma, chronic pain  PTA pt states independence with ADL status, transfers and functional mobility with use of cane prn, + +falls  During intital eval pt required min A for ADL'S and transfers, and Mod A for functional mobility  Pt demonstrated deficits with functional mobility, functional balance, transfer safety, cognition (IE  awareness, precautions), activity tolerance, and decreased ADL status  Pt would benefit from OT treatment 3-5x a week for 1-2weeks  OT recommending STR at d/c  Goals   Patient Goals "To go home"   STG Time Frame   (1-7)   Short Term Goal #1 Pt will demonstrate transfer/walker safety 100% of the time  Short Term Goal #2 Pt will demonstrate mod I with UE and LE bathing/dressing while seated EOB      Short Term Goal  Pt will continue to tolerate continued cognitive assessment as appropriate to assist with independent living at home  LTG Time Frame   (7-14)   Long Term Goal #1 Pt will demonstrate improved activity tolerance(20-30 mins) to assist with functional mobility and endurance  Long Term Goal #2 Pt will verbalize 2-3 personal cognitive deficits and appropriate compensatory strategies  Long Term Goal Pt will improve UE B/L strength by one grade to assist with UE dressing  Plan   Treatment Interventions ADL retraining;UE strengthening/ROM; Endurance training;Cognitive reorientation;Patient/family training;Equipment evaluation/education;Continued evaluation; Activityengagement   Goal Expiration Date 05/09/22   OT Treatment Day 0   OT Frequency 3-5x/wk   Recommendation   OT Discharge Recommendation Post acute rehabilitation services   OT - OK to Discharge Yes  (To rehab when medically cleared )   AM-PAC Daily Activity Inpatient   Lower Body Dressing 3   Bathing 3   Toileting 3   Upper Body Dressing 3   Grooming 3   Eating 3   Daily Activity Raw Score 18   Daily Activity Standardized Score (Calc for Raw Score >=11) 38 66   AM-PAC Applied Cognition Inpatient   Following a Speech/Presentation 3   Understanding Ordinary Conversation 4   Taking Medications 3   Remembering Where Things Are Placed or Put Away 3   Remembering List of 4-5 Errands 3   Taking Care of Complicated Tasks 3   Applied Cognition Raw Score 19   Applied Cognition Standardized Score 39 77   Michellyln Samantha

## 2022-04-25 NOTE — CASE MANAGEMENT
Case Management Discharge Planning Note    Patient name Ade Amezcua  Location [address] [place] [address] /E2 -* MRN [id number]  : 1955 Date 2022       Current Admission Date: 2022  Current Admission Diagnosis:Acute metabolic encephalopathy   Patient Active Problem List    Diagnosis Date Noted    LAILA (acute kidney injury) ([facility] ) 2022    Polypharmacy 2022    'light-for-dates' infant with signs of fetal malnutrition 2022    Bipolar depression ([facility] ) 2022    Rheumatoid arthritis ([facility] ) 2022    Closed fracture of multiple ribs of right side 2022    Traumatic pneumothorax 2022    Closed fracture of transverse process of lumbar vertebra ([facility] ) 2022    Liver contusion 2022    Fall 2022    Acute pain due to trauma 2022    Right shoulder pain 2022    Severe protein-calorie malnutrition ([facility] ) 2022    Undifferentiated connective tissue disease ([facility] ) 2021    Primary generalized (osteo)arthritis 2021    Peripheral neuropathy 2021    Senile osteoporosis 2021    Hx of compression fracture of spine 2021    Lumbar spondylosis 2021    Cervical spondylosis 2021    Anemia 2021    Chronic pain syndrome     Asthma     Ambulatory dysfunction     Acute metabolic encephalopathy     Acute cystitis without hematuria     Seronegative arthropathy of multiple sites ([facility] )     Gastroesophageal reflux disease without esophagitis     Mixed stress and urge urinary incontinence     CHF (congestive heart failure) ([facility])       LOS (days): 2  Geometric Mean LOS (GMLOS) (days):   Days to GMLOS:     OBJECTIVE:  Risk of Unplanned Readmission Score: 19      Current admission status: Inpatient   Preferred Pharmacy:   [address] [place], [place] [address] [address]  Phone: [phone] Fax: [phone]    Primary Care Provider: Keya Arteaga DO    Primary Insurance: MEDICARE  Secondary Insurance: AARP    DISCHARGE DETAILS:    Discharge planning discussed with[de-identified] Daughter  Freedom of Choice: Yes  Comments - Freedom of Choice: [facility] able to accept pt for STR  Pt will need a 3 midnight IP stay  Will be able to discharge tomorrow  Dtr will call her uncle to see if he can provide transportation for her  Dtr will call back with a time he is available for tomorrow    CM contacted family/caregiver?: Yes     Contacts  Patient Contacts: Digna Chen  Relationship to Patient[de-identified] Family  Contact Method: Phone  Phone Number: [phone]  Reason/Outcome: Continuity of [facility] Planning    Treatment Team Recommendation: Short Term Rehab  Discharge Destination Plan[de-identified] Short Term Rehab     IMM Given (Date):: 04/25/22  IMM Given to[de-identified] Family

## 2022-04-25 NOTE — CONSULTS
Consultation - Geriatrics   Sofia Larsen 78 y o  female MRN: [id number]  Unit/Bed#: E2 -01 Encounter: [id number]      Assessment/Plan  1  Ambulatory dysfunction s/p fall   Baseline ambulation walker  o Fall type x2 recent falls   PT OT following  Rec STR  o Continue to monitor pt's progress- does she need increased cares at home vs alternate    Fall precautions  2  Deconditioning/frailty  · Baseline function prior to fall, pt was independent with adl/ialds  She currently is needing assist with medication management and day to day function    · Now increased risk for deconditioning/frailty d/t hospitalizations, falls, acute on chronic pain, increased anxiety d/t current condition  · Optimize diet, hydration, mobility for healing  · GFR 55 (was 17 on admit) - keep hydrated, avoid nephrotoxins  · Alb 3 8 - monitor po intake and consider nutrition eval as needed  · Monitor ss infection, dehydration, dvt, skin breakdown  · Encourage cough and deep breathing exercises, IS   3   Forgetfulness  · Baseline mentation:  Increased forgetfulness recently  · CTH:  No acute intracranial abnormality  Chronic sm vessel ischemic changes  · Gerilabs:  No recent tsh, b12, folate check - consider recheck  · Recommend OT check MOCa when medically clear and f/u with outpt geriatrics if concerns  · If any concerns about pt's ability for medical decision making, may need to have neuropsych eval prior to discharge  · Engage in regular social, physical, cognitive activity  Optimize acute and chronic conditions  · Frequent reminders and close monitoring for safety  · Per chart review, family concerned with pt's ability to care for self at home  · CM following for safe dcp - may need to increase cares at home to assist pt as needed  · May benefit from bubble packs if not taking meds correctly  4    Acute encephalopathy w/hallucinations  · Acute mixed hyperactive/hypoactive delirium noted at home and in hospital  · Cause considerations: polypharmacy/medication noncompliance, etoh use (reported at home), acute on chronic pain (on limited pain medication at present), anxiety, hospitalization, constipation, dehydration  · She is negative for UTI, no wbc, afebrile  · [place] delirium precautions  First line treatment is reorient, redirect, reassure  Include family as able  Avoid restraints and sedating medications  · Identify and treat reversible causes of confusion including infection, dehydration, electrolyte imbalance, anemia, hypoxia, urinary retention, constipation, pain, sleep disturbance  · Avoid deliriogenic medications such as tramadol, high dose narcotics, benzodiazepines, anticholinergics  · Engage in regular activity  Monitor sleep wake cycle  Monitor depression  · Ensure adequate hydration and nutrition  Mobilize early and often according to poc  · Qtc 404ms  5  Acute on chronic pain  · Per review of pt last [facility] pain management note, pt was taking mso4 ER and medical marijuana with adequate pain control  · Pt not taking mso4 ER inpt - may be contributing to agitations (withdrawal)  · Using oxyIR frequently - monitor for constipation  · Continues on plaquenil for RA  · Consider restarting low dose gabapentin now that renal status is improved  · Consider scheduling tylenol and topical analgesics such as lido patch  · Agree with discontinuation of muscle relaxers d/t inc fall risk  · Cont nonpharm methods of pain control  6  Anxiety/depression/bipolar  · Pt reporting increased anxiety w/current conditions  · Seen by [facility] this admit:  Recommend cont current bipolar treatement, f/u outpt for continued treatment  ·  Wellbutrin, lamictal continue  · Agree with GDR ativan secondary to SE and interaction with chronic pain meds  · Cont relaxation techniques, emotional support  · May consider starting melatonin 3mg hs vs restart low dose gabapentin to help with sleep/pain relief      7   Vision/hearing impairment   Baseline vision/hearing:  Wears glasses, no hearing impairment   Cont supportive environment:  adequate lighting, quiet/calm space  Reminders for glasses  Speak clearly and toward patient when communicating  Avoid outside noise distractions  Assist as needed for adls  8   Constipation  LBM 4/25   **Home med list include miralax and senna   **recommend adding bid senna-s, miralax as pt taking chronic opiods  Encourage dietary fiber, fluids, mobility as able  9  Polypharmacy  · Multiple pain and antianxiety medications ordered per pdmp and pharmacy  · Recommend VNA to review home medications vs bring all medications in to be reviewed by hospital/rehab nursing  · Would not restart muscle relaxers  Would GDR ativan  · May need to restart mso4 ER to avoid withdrawal symptoms  10   Swallow concerns  · Pt reporting occasional swallow difficulties  · Recommend ST eval   Aspiration precautions  11   's safety  · Prior to falls, no concerns for driving per daughter  · Discussed with daughter, recommend OT 's safety course prior to resuming driving once medically cleared  12   Home medication review   PDMP reviewed:  Chronic, long term use of ativan, morphine sulfate ER, oxyIR  Recent medication list reviewed with [facility] [phone]    *List is different from Epic list, please review  Additionally, pt has [facility], unable to verify their medication list (mail order)    Duloxetine 60mg bid - last fill 4/2 #60  Prazosin 1mg qd - LF 4/2  Methocarbamol 500mg q6h prn - LF 4/2  Hydroxychlorquine 200mg bid - LF 4/2  Lamotrigine 150mg qd - LF 4/2  wellbutrin XL 450mg (150mg + 300mg) qd - LF 4/5  oxyIR 5mg q6h prn - LF 4/7 #20  cyclobenzapine 5mg tid - LF 4/7 #45  Gabapentin 300mg bid - LF 4/7 #40   Gabapentin 400mg qhs - LF 4/20 #30  MSO4 ER 15mg bid - LF 4/20 #60  Lorazepam 1mg qhs and qd prn - LF 4/20 #40      History of Present Illness   Physician Requesting Consult: Rickey Cortez MD  Reason for Consult / Principal Problem: polypharm and falls  Hx and PE limited by: n/a  HPI: Jasmina Shepherd is a 78y o  year old female who presents with hallucinations and unwitnessed fall  She was recently in [facility] for rib fx, pneumothorax, and liver contusion - 3/1-3/8 followed by STR  She was [facility] by [facility] this admission  Comorbidities include chronic pain syndrome, bipolar, ambulatory dysfunction  Patient seen for eval   She is resting in bed  She is alert, oriented to person, mostly tps  She states she is here after falls and taking too much pain medication  She is having increased pain d/t falls  She reports right rib pain, left hip pain, overall back pain and generalized RA pain  She reports currently that her skin feels itchy/painful d/t sheets  She denies cp/sob/cough  She denies gi/gu distress  She reports poor sleep and not great appetite  Patient states prior to falls she lived at home and was independent with adl/iadls  She used walker for ambulating  She wears glasses, no hearing or dentition issues  She sometimes has trouble swallowing  Pt states appetite is not great  She reports chronic poor sleep  She reports chronic pain from RA- she takes ms contin and medical marijuana and follows with [facility] pain specialist   Pt denies constipation, but reports UTI in past caused confusions  Pt has chronic anxiety/depression which is flared presently  She reports increased forgetfulness recently  Was able to touch base with daughter who states that up until rib fracture fall, pt had been fairly independent with adl/iadls  She was driving and ambulating without difficulty  Family involved for assist   Pt does have chronic pain syndrome  Prior to fall, pt's pain was well controlled  With recent falls, patient has had increased pain and dgt feels that increased pain medications were not helping overall cognition/function  She has a plan to increase assistance for iadls    Pt's brother will be moving in with her to help with day to day function  Patient is retired nurse  Inpatient consult to Gerontology  Consult performed by: LAURA Duarte  Consult ordered by: Ansley Mcnair PA-C          Review of Systems   Constitutional: Positive for appetite change (decreased)  Negative for activity change, chills and fatigue  HENT: Positive for trouble swallowing (occasional)  Negative for congestion and hearing loss  Eyes: Positive for visual disturbance (wears glasses)  Respiratory: Negative for cough and shortness of breath  Cardiovascular: Negative for chest pain  Gastrointestinal: Negative for abdominal pain, constipation, diarrhea, nausea and vomiting  Genitourinary: Negative for difficulty urinating  Musculoskeletal: Positive for arthralgias (generalized RA), back pain and gait problem  Neurological: Positive for weakness (generalized)  Negative for dizziness and light-headedness  Psychiatric/Behavioral: Positive for confusion, decreased concentration (forgetful), dysphoric mood and sleep disturbance  The patient is nervous/anxious          Historical Information   Past Medical History:   Diagnosis Date    Anxiety     Asthma     Chronic pain     Depression     Enterovirus heart infection      Past Surgical History:   Procedure Laterality Date    FIXATION KYPHOPLASTY LUMBAR SPINE      HYSTERECTOMY      REPAIR RECTOCELE       Social History   Social History     Substance and Sexual Activity   Alcohol Use Not Currently    Comment: social     Social History     Substance and Sexual Activity   Drug Use Yes    Types: Marijuana    Comment: has medical card     Social History     Tobacco Use   Smoking Status Never Smoker   Smokeless Tobacco Never Used         Family History:   Family History   Problem Relation Age of Onset    No Known Problems Mother     No Known Problems Father        Meds/Allergies   Current meds:   Current Facility-Administered Medications   Medication Dose Route Frequency    acetaminophen (TYLENOL) tablet 650 mg  650 mg Oral Q6H PRN    buPROPion (WELLBUTRIN XL) 24 hr tablet 450 mg  450 mg Oral Daily    heparin (porcine) subcutaneous injection 5,000 Units  5,000 Units Subcutaneous Q8H [address]    hydroxychloroquine (PLAQUENIL) tablet 200 mg  200 mg Oral BID    lamoTRIgine (LaMICtal) tablet 150 mg  150 mg Oral Daily    LORazepam (ATIVAN) injection 0 5 mg  0 5 mg Intravenous Q6H PRN    ondansetron (ZOFRAN) injection 4 mg  4 mg Intravenous Q6H PRN    oxyCODONE (ROXICODONE) IR tablet 5 mg  5 mg Oral Q4H PRN    pantoprazole (PROTONIX) EC tablet 40 mg  40 mg Oral Early Morning      Current PTA meds:  Medications Prior to Admission   Medication    acetaminophen (TYLENOL) 325 mg tablet    albuterol (PROVENTIL HFA,VENTOLIN HFA) 90 mcg/act inhaler    buPROPion (WELLBUTRIN) 100 mg tablet    carvedilol (COREG) 25 mg tablet    cyclobenzaprine (FLEXERIL) 5 mg tablet    gabapentin (NEURONTIN) 300 mg capsule    gabapentin (Neurontin) 300 mg capsule    hydroxychloroquine (PLAQUENIL) 200 mg tablet    LamoTRIgine (LAMICTAL PO)    lidocaine (Lidoderm) 5 %    LORazepam (ATIVAN) 1 mg tablet    naloxone (NARCAN) 4 mg/0 1 mL nasal spray    neomycin-bacitracin-polymyxin (NEOSPORIN) 5-400-5,000 ointment    oxyCODONE (Roxicodone) 5 immediate release tablet    pantoprazole (PROTONIX) 40 mg tablet    polyethylene glycol (MIRALAX) 17 g packet    senna-docusate sodium (SENOKOT S) 8 6-50 mg per tablet    sodium chloride (OCEAN) 0 65 % nasal spray        Allergies   Allergen Reactions    Penicillins        Objective   Vitals: Blood pressure 161/92, pulse 75, temperature 97 5 °F (36 4 °C), temperature source Temporal, resp  rate 18, height 5' 4" (1 626 m), weight 64 kg (141 lb), SpO2 100 %  ,Body mass index is 24 2 kg/m²  Physical Exam  Vitals and nursing note reviewed  Constitutional:       General: She is not in acute distress  Appearance: Normal appearance   She is well-developed  She is not diaphoretic  HENT:      Head: Normocephalic  Cardiovascular:      Rate and Rhythm: Normal rate  Heart sounds: No murmur heard  No friction rub  No gallop  Pulmonary:      Effort: Pulmonary effort is normal  No respiratory distress  Breath sounds: Normal breath sounds  No wheezing or rales  Abdominal:      General: Bowel sounds are normal  There is no distension  Palpations: Abdomen is soft  Tenderness: There is no abdominal tenderness  There is no rebound  Musculoskeletal:         General: Normal range of motion  Skin:     General: Skin is warm and dry  Neurological:      General: No focal deficit present  Mental Status: She is alert  Mental status is at baseline  Comments: Oriented to self, partial tps  Forgetful, cooperative   Psychiatric:         Mood and Affect: Mood normal          Behavior: Behavior normal          Lab Results:   Results from last 7 days   Lab Units 04/24/22  0441   WBC Thousand/uL 4 56   HEMOGLOBIN g/dL 9 1*   HEMATOCRIT % 29 4*   PLATELETS Thousands/uL 133*        Results from last 7 days   Lab Units 04/24/22  0441 04/23/22  0544 04/22/22  2208   POTASSIUM mmol/L 3 5   < > 4 5   CHLORIDE mmol/L 107   < > 103   CO2 mmol/L 24   < > 25   BUN mg/dL 12   < > 35*   CREATININE mg/dL 1 04   < > 2 68*   CALCIUM mg/dL 8 8   < > 9 7   ALK PHOS U/L  --   --  95   ALT U/L  --   --  19   AST U/L  --   --  24    < > = values in this interval not displayed  Imaging Studies: I have personally reviewed pertinent reports  EKG, Pathology, and Other Studies: I have personally reviewed pertinent reports      VTE Prophylaxis: Heparin    Code Status: Level 1 - Full Code

## 2022-04-25 NOTE — ASSESSMENT & PLAN NOTE
· Unwitnessed fall      · Patient states she tripped has had mechanical falls in the past  · 2D echo with ejection fraction 32%, type 1 diastolic dysfunction  · PT recommending rehab, patient in agreement  · Plan for [facility] tomorrow

## 2022-04-25 NOTE — PLAN OF CARE
Problem: PHYSICAL THERAPY ADULT  Goal: Performs mobility at highest level of function for planned discharge setting  See evaluation for individualized goals  Description: Treatment/Interventions: Functional transfer training,LE strengthening/ROM,Elevations,Therapeutic exercise,Endurance training,Cognitive reorientation,Patient/family training,Equipment eval/education,Gait training,Spoke to nursing  Equipment Recommended: Paty Rothman (Comment) (RW which she owns)       See flowsheet documentation for full assessment, interventions and recommendations  Outcome: Progressing  Note: Prognosis: Fair  Problem List: Decreased strength,Decreased endurance,Decreased range of motion,Impaired balance,Decreased mobility,Decreased cognition,Impaired judgement,Pain  Assessment: Pt  is a high risk of fall with insconsistent anna with NBOS  Cues to stay within and cnetered in the RW during ambulation given  Pt  tend to take her hands off the RW in the midst of ambualtion reporting spasms in the L rib area  Impaired cognition and decreased safety awareness noted t/o session  Will continue to follow during the stay to maximzie functional mobility  Recommend rehab at [place] when medically cleared as patient continues to require Min A for all safe mobility and is below her baseline for safe mobility  No overt LOB noted t/o session however patient fatigues easily with exertion and needed standing rests during ambulation  Barriers to Discharge: Decreased caregiver support  Barriers to Discharge Comments: Patient has sustained 2 falls which have required medical attention in the past 2 months  PT Discharge Recommendation: Post acute rehabilitation services          See flowsheet documentation for full assessment

## 2022-04-25 NOTE — NURSING NOTE
Patient again very confused overnight  Bed alarm on at all times while in bed and chair alarm on at all time while in chair  Patient alert to self, very impulsive, poor judgement and safety awareness  She was very anxious, but couldn't identify what was making her anxious  Patient also visually hallucinating - saw two dogs in her room and was asking the nurse for dog biscuits to give the dogs treats  Patient also hypervigilant, easily distressed by slightest noise or movement  She did not sleep for most of the night  She had periods where she undressed and couldn't tolerate anything touching her skin  RN gave patient paper scrubs to try and patient couldn't tolerate it  Also tried pajama pants and patient couldn't tolerate it touching her skin  Patient also complaining of severe pain in her left lower back and rib cage as patient has lumbar and rib fractures  Oxycodone 5 mg po given for pain  Ativan 0 5 mg IV given for agitation  Patient stated that she was taking Ativan 1 mg po bid at home  When RN looked at the patient's home med list, Ativan had been ordered for bedtime only x 3 days in March  Patient was also talking about other medications that she was on at home which did not coincide with home med list on file (example: Luis Hodgson - patient states she takes that for IBS-constipation)  Patient very unsteady on her feet, unable to follow instructions to use the walker to ambulate to the bathroom  RN tried decreasing stimulation (lowering the lights, turning off television, low volume music, shutting blinds, closing door partially), reorienting patient that she was in hospital, paraphrasing what patient was saying and asking for clarification, offering snack and fresh drink, offering toileting, applying lotion to skin, combing hair, brushing teeth, adjusting room temperature down and then up, changing gown

## 2022-04-25 NOTE — PHYSICAL THERAPY NOTE
Physical Therapy Treatment Note     04/25/22 1114   PT Last Visit   PT Visit Date 04/25/22   Note Type   Note Type Treatment   Pain Assessment   Pain Assessment Tool 0-10   Pain Score 10 - Worst Possible Pain   Pain Location/Orientation Orientation: Left; Location: Back; Location: Rib Cage   Restrictions/Precautions   Weight Bearing Precautions Per Order No   Other Precautions Fall Risk;Pain;Cognitive; Chair Alarm; Impulsive   General   Chart Reviewed Yes   Subjective   Subjective Pt  seated at EOB upon entry  Agreeable to PT  My butt hurts sitting here   Transfers   Sit to Stand 4  Minimal assistance   Additional items Assist x 1;Verbal cues; Increased time required   Stand to Sit 5  Supervision   Additional items Assist x 1;Verbal cues;Armrests   Stand pivot 4  Minimal assistance   Additional items Assist x 1;Verbal cues; Increased time required   Ambulation/Elevation   Gait pattern Forward Flexion; Inconsistent anna;Narrow AYLA; Improper Weight shift; Short stride; Excessively slow;Decreased heel strike  (Kyphotic posture)   Gait Assistance 4  Minimal assist   Additional items Assist x 1;Verbal cues; Other (Comment)  (A in negotiating RW)   Assistive Device Rolling walker   Distance 90ft x 2   Balance   Static Sitting Good   Dynamic Sitting Fair +   Static Standing Fair -   Dynamic Standing Poor +   Ambulatory Poor +   Endurance Deficit   Endurance Deficit Yes   Endurance Deficit Description pain   Activity Tolerance   Activity Tolerance Patient tolerated treatment well;Patient limited by pain   Nurse Made Aware Yes   Assessment   Prognosis Fair   Problem List Decreased strength;Decreased endurance;Decreased range of motion; Impaired balance;Decreased mobility; Decreased cognition; Impaired judgement;Pain   Assessment Pt  is a high risk of fall with insconsistent anna with NBOS  Cues to stay within and cnetered in the RW during ambulation given   Pt  tend to take her hands off the RW in the midst of ambualtion reporting spasms in the L rib area  Impaired cognition and decreased safety awareness noted t/o session  Will continue to follow during the stay to [facility] functional mobility  Recommend rehab at [place] when medically cleared as patient continues to require Min A for all safe mobility and is below her baseline for safe mobility  No overt LOB noted t/o session however patient fatigues easily with exertion and needed standing rests during ambulation  Barriers to Discharge Decreased caregiver support   Goals   Patient Goals None reported   STG Expiration Date 05/04/22   PT Treatment Day 2   Plan   Treatment/Interventions Functional transfer training;Patient/family training;Gait training;Equipment eval/education;Spoke to nursing   Progress Progressing toward goals   PT Frequency 3-5x/wk   Recommendation   PT Discharge Recommendation Post acute rehabilitation services   Equipment Recommended Walker   AM-PAC Basic Mobility Inpatient   Turning in Bed Without Bedrails 3   Lying on Back to Sitting on Edge of Flat Bed 4   Moving Bed to Chair 3   Standing Up From Chair 3   Walk in Room 3   Climb 3-5 Stairs 3   Basic Mobility Inpatient Raw Score 19   Basic Mobility Standardized Score 42 48   Highest Level Of Mobility   JH-HLM Goal 6: Walk 10 steps or more   JH-HLM Highest Level of Mobility 7: Walk 25 feet or more   JH-HLM Goal Achieved Yes   End of Consult   Patient Position at End of Consult All needs within reach;Bed/Chair alarm activated; Bedside chair         Swapna Rao PTA    An AM-PAC basic mobility standardized score less than 42 9 suggest the patient may benefit from discharge to post-acute rehab services

## 2022-04-25 NOTE — PLAN OF CARE
Problem: Potential for Falls  Goal: Patient will remain free of falls  Description: INTERVENTIONS:  - Educate patient/family on patient safety including physical limitations  - Instruct patient to call for assistance with activity   - Consult OT/PT to assist with strengthening/mobility   - Keep Call bell within reach  - Keep bed low and locked with side rails adjusted as appropriate  - Keep care items and personal belongings within reach  - Initiate and maintain comfort rounds  - Make Fall Risk Sign visible to staff  - Offer Toileting every 2 Hours, in advance of need  - Initiate/Maintain  bed/chair alarm  - Obtain necessary fall risk management equipment: bedside commode, walker, gripper socks, bed alarm, call bell, rounds, bedpan  - Apply yellow socks and bracelet for high fall risk patients  - Consider moving patient to room near nurses station  Outcome: Progressing     Problem: MOBILITY - ADULT  Goal: Maintain or return to baseline ADL function  Description: INTERVENTIONS:  -  Assess patient's ability to carry out ADLs; assess patient's baseline for ADL function and identify physical deficits which impact ability to perform ADLs (bathing, care of mouth/teeth, toileting, grooming, dressing, etc )  - Assess/evaluate cause of self-care deficits   - Assess range of motion  - Assess patient's mobility; develop plan if impaired  - Assess patient's need for assistive devices and provide as appropriate  - Encourage maximum independence but intervene and supervise when necessary  - Involve family in performance of ADLs  - Assess for home care needs following discharge   - Consider OT consult to assist with ADL evaluation and planning for discharge  - Provide patient education as appropriate  Outcome: Progressing     Problem: PAIN - ADULT  Goal: Verbalizes/displays adequate comfort level or baseline comfort level  Description: Interventions:  - Encourage patient to monitor pain and request assistance  - Assess pain using appropriate pain scale  - Administer analgesics based on type and severity of pain and evaluate response  - Implement non-pharmacological measures as appropriate and evaluate response  - Consider cultural and social influences on pain and pain management  - Notify physician/advanced practitioner if interventions unsuccessful or patient reports new pain  Outcome: Progressing     Problem: INFECTION - ADULT  Goal: Absence or prevention of progression during hospitalization  Description: INTERVENTIONS:  - Assess and monitor for signs and symptoms of infection  - Monitor lab/diagnostic results  - Monitor all insertion sites  - Administer medications as ordered  - Instruct and encourage patient to use good hand hygiene technique  Outcome: Progressing     Problem: DISCHARGE PLANNING  Goal: Discharge to home or other facility with appropriate resources  Description: INTERVENTIONS:  - Identify barriers to discharge w/patient and caregiver  - Arrange for needed discharge resources and transportation as appropriate  - Identify discharge learning needs   - Refer to Case Management Department for coordinating discharge planning if the patient needs post-hospital services based on physician/advanced practitioner order or complex needs related to functional status, cognitive ability, or social support system  Outcome: Progressing     Problem: Knowledge Deficit  Goal: Patient/family/caregiver demonstrates understanding of disease process, treatment plan, medications, and discharge instructions  Description: Complete learning assessment and assess knowledge base    Interventions:  - Provide teaching at level of understanding  - Provide teaching via preferred learning methods  Outcome: Progressing     Problem: NEUROSENSORY - ADULT  Goal: Achieves stable or improved neurological status  Description: INTERVENTIONS  - Monitor and report changes in neurological status  - Monitor vital signs such as temperature, blood pressure, glucose, and any other labs ordered   - Monitor for seizure activity and implement precautions if appropriate      Outcome: Progressing     Problem: CARDIOVASCULAR - ADULT  Goal: Maintains optimal cardiac output and hemodynamic stability  Description: INTERVENTIONS:  - Monitor I/O, vital signs and rhythm  - Monitor for S/S and trends of decreased cardiac output  - Administer and titrate ordered vasoactive medications to optimize hemodynamic stability  - Assess quality of pulses, skin color and temperature  - Assess for signs of decreased coronary artery perfusion  - Instruct patient to report change in severity of symptoms  Outcome: Progressing  Goal: Absence of cardiac dysrhythmias or at baseline rhythm  Description: INTERVENTIONS:  - Continuous cardiac monitoring, vital signs, obtain 12 lead EKG if ordered  - Administer antiarrhythmic and heart rate control medications as ordered  - Monitor electrolytes and administer replacement therapy as ordered  Outcome: Progressing     Problem: RESPIRATORY - ADULT  Goal: Achieves optimal ventilation and oxygenation  Description: INTERVENTIONS:  - Assess for changes in respiratory status  - Assess for changes in mentation and behavior  - Position to facilitate oxygenation and minimize respiratory effort  - Oxygen administered by appropriate delivery if ordered  - Encourage broncho-pulmonary hygiene including cough, deep breathe, Incentive Spirometry  - Assess and instruct to report SOB or any respiratory difficulty  - Respiratory Therapy support as indicated  Outcome: Progressing     Problem: Prexisting or High Potential for Compromised Skin Integrity  Goal: Skin integrity is maintained or improved  Description: INTERVENTIONS:  - Identify patients at risk for skin breakdown  - Assess and monitor skin integrity  - Assess and monitor nutrition and hydration status  - Monitor labs   - Assess for incontinence   - Assist patient to turn and reposition every 2 hours while in bed  - Assist with mobility/ambulation  - Relieve pressure over bony prominences  - Avoid friction and shearing  - Provide appropriate hygiene as needed including keeping skin clean and dry  - Evaluate need for skin moisturizer/barrier cream  - Collaborate with interdisciplinary team   - Patient teaching  Outcome: Progressing     Problem: COPING  Goal: Pt able to verbalize concerns and demonstrate effective coping strategies  Description: INTERVENTIONS:  - Assist patient to identify coping skills, available support systems and cultural and spiritual values  - Provide emotional support, including active listening and acknowledgement of concerns of patient and caregivers  - Reduce environmental stimuli, as able  - Provide patient education  - Assess for spiritual pain/suffering and initiate spiritual care, including notification of Pastoral Care or kyleigh based community as needed  - Assess effectiveness of coping strategies  Outcome: Progressing     Problem: CONFUSION/THOUGHT DISTURBANCE  Goal: Thought disturbances (confusion, delirium, depression, dementia or psychosis) are managed to maintain or return to baseline mental status and functional level  Description: INTERVENTIONS:  - Assess for possible contributors to  thought disturbance, including but not limited to medications, infection, impaired vision or hearing, underlying metabolic abnormalities, dehydration, respiratory compromise,  psychiatric diagnoses and notify attending PHYSICAN/AP  - Monitor and intervene to maintain adequate nutrition, hydration, elimination, sleep and activity  - Decrease environmental stimuli, including noise as appropriate  - Provide frequent contacts to provide refocusing, direction and reassurance as needed  Approach patient calmly with eye contact and at their level    - [place] high risk fall precautions, aspiration precautions and other safety measures, as indicated  - If delirium suspected, notify physician/AP of change in condition and request immediate in-person evaluation  - Pursue consults as appropriate including Geriatric (campus dependent), OT for cognitive evaluation/activity planning, psychiatric, pastoral care, etc   Outcome: Progressing

## 2022-04-25 NOTE — PROGRESS NOTES
[facility]  Progress Note - Yenifer Mart 1955, 78 y o  female MRN: [id number]  Unit/Bed#: E2 -01 Encounter: [id number]  Primary Care Provider: Carolyn Fernandez DO   Date and time admitted to hospital: 4/22/2022  9:30 PM    * Acute metabolic encephalopathy  Assessment & Plan  this is 62-year-old female with history of chronic pain, bipolar disorder had a recent fall with multiple displaced rib fractures admitted to [facility] under trauma service from 03/01 through 3/8  Patient was also found to have a pneumothorax and liver contusion conservatively  Patient lives alone presented to the ED with fall at home  Patient does admit to taking an extra dose of lorazepam  According to patient's daughter patient has been confused and having hallucinations at home for the past 1 week  There is concern the patient is not able to take care of herself and not take her medications appropriately    Patient was given Narcan in the ED for somnolence    · Likely secondary to medication noncompliance and abuse  · Awake alert oriented x3, answering questions appropriately  · Reports hallucinations has improved  · CT head and neck and CT spine negative for any acute abnormality  · Appreciate psych evaluation, continue current medications  · Patient will need outpatient follow-up with Psychiatry for medication adjustment    Polypharmacy  Assessment & Plan  · Suspected by ams and lethargy w/hypoxia and hypercapnia responsive to narcan and fall w/recent admission for fall resultant in multiple broken ribs and liver contusion  · Geriatric consultation and recommendations appreciated    LAILA (acute kidney injury) ([facility] [address] )  Assessment & Plan  · Resolved with IVFs    Rheumatoid arthritis ([facility] [address] )  Assessment & Plan  · Continue hydroxychloroquine    Bipolar depression ([facility] [address] )  Assessment & Plan  · Continue lamictal/wellbutrin  · Daughter had concerned the patient is not taking medications appropriately and is having hallucinations  · Psychiatry consultation appreciated  · Recommended to continue current regimen and follow closely outpatient    Severe protein-calorie malnutrition ([address] )  Assessment & Plan  Malnutrition Findings:     Consult nutrition    BMI Findings: Body mass index is 24 2 kg/m²  Fall  Assessment & Plan  · Unwitnessed fall  · Patient states she tripped has had mechanical falls in the past  · 2D echo with ejection fraction 11%, type 1 diastolic dysfunction  · PT recommending rehab, patient in agreement  · Plan for [address] tomorrow      VTE Pharmacologic Prophylaxis:   Pharmacologic: Heparin  Mechanical VTE Prophylaxis in Place: Yes    Patient Centered Rounds: I have performed bedside rounds with nursing staff today  Discussions with Specialists or Other Care Team Provider:  Geriatrics    Education and Discussions with Family / Patient:  Patient, daughter    Time Spent for Care: 30 minutes  More than 50% of total time spent on counseling and coordination of care as described above  Current Length of Stay: 2 day(s)    Current Patient Status: Inpatient   Certification Statement: The patient will continue to require additional inpatient hospital stay due to pending placement    Discharge Plan: 24 hours    Code Status: Level 1 - Full Code      Subjective:   Patient continues to have episodes of hallucinations overnight, this morning reports doing a lot better  She did have episodes of nausea, but no emesis  Reports poor appetite  Denies chest pains or shortness of breath  Objective:     Vitals:   Temp (24hrs), Av 8 °F (36 6 °C), Min:97 4 °F (36 3 °C), Max:98 8 °F (37 1 °C)    Temp:  [97 4 °F (36 3 °C)-98 8 °F (37 1 °C)] 97 5 °F (36 4 °C)  HR:  [75-78] 75  Resp:  [16-18] 18  BP: (156-161)/(74-92) 161/92  SpO2:  [97 %-100 %] 100 %  Body mass index is 24 2 kg/m²  Input and Output Summary (last 24 hours):        Intake/Output Summary (Last 24 hours) at 2022 [address] filed at 4/25/2022 0553  Gross per 24 hour   Intake --   Output 1100 ml   Net -1100 ml       Physical Exam:     Physical Exam  Vitals and nursing note reviewed  Constitutional:       General: She is not in acute distress  Appearance: Normal appearance  She is normal weight  She is not ill-appearing  Comments: Appears older than stated age   HENT:      Head: Normocephalic and atraumatic  Right Ear: External ear normal       Left Ear: External ear normal       Nose: Nose normal    Eyes:      Conjunctiva/sclera: Conjunctivae normal       Pupils: Pupils are equal, round, and reactive to light  Cardiovascular:      Rate and Rhythm: Normal rate and regular rhythm  Pulmonary:      Effort: Pulmonary effort is normal       Breath sounds: No wheezing or rhonchi  Abdominal:      General: Bowel sounds are normal  There is no distension  Palpations: Abdomen is soft  Tenderness: There is no abdominal tenderness  Musculoskeletal:         General: No swelling  Right lower leg: No edema  Left lower leg: No edema  Skin:     General: Skin is warm and dry  Neurological:      General: No focal deficit present  Mental Status: She is alert  Mental status is at baseline         Additional Data:     Labs:    Results from last 7 days   Lab Units 04/24/22  0441   WBC Thousand/uL 4 56   HEMOGLOBIN g/dL 9 1*   HEMATOCRIT % 29 4*   PLATELETS Thousands/uL 133*   NEUTROS PCT % 65   LYMPHS PCT % 19   MONOS PCT % 14*   EOS PCT % 2     Results from last 7 days   Lab Units 04/24/22  0441 04/23/22  0544 04/22/22  2208   SODIUM mmol/L 140   < > 139   POTASSIUM mmol/L 3 5   < > 4 5   CHLORIDE mmol/L 107   < > 103   CO2 mmol/L 24   < > 25   BUN mg/dL 12   < > 35*   CREATININE mg/dL 1 04   < > 2 68*   ANION GAP mmol/L 9   < > 11   CALCIUM mg/dL 8 8   < > 9 7   ALBUMIN g/dL  --   --  3 8   TOTAL BILIRUBIN mg/dL  --   --  0 62   ALK PHOS U/L  --   --  95   ALT U/L  --   --  19   AST U/L  -- --  24   GLUCOSE RANDOM mg/dL 85   < > 67    < > = values in this interval not displayed  Results from last 7 days   Lab Units 04/22/22  2208   INR  1 12     Results from last 7 days   Lab Units 04/23/22  0631   POC GLUCOSE mg/dl 77         Results from last 7 days   Lab Units 04/22/22 2208   LACTIC ACID mmol/L 1 8   PROCALCITONIN ng/ml 0 28*           * I Have Reviewed All Lab Data Listed Above  * Additional Pertinent Lab Tests Reviewed: [address] [facility] Admission Reviewed    Imaging:    CT abdomen pelvis wo contrast    Result Date: 4/22/2022  Impression: 1  Stable right posterior 10th and 11th and 12 rib fractures and chronic T11, L1 and L4 fractures  2   No evidence of acute intra-abdominal or pelvic trauma  Workstation performed: [id number]     XR chest 1 view portable    Result Date: 4/23/2022  Impression: No acute cardiopulmonary disease  Workstation performed: [id number]     XR hip/pelv 2-3 vws left    Result Date: 4/23/2022  Impression: No acute osseous abnormality  Workstation performed: [id number]     CT head without contrast    Result Date: 4/22/2022  Impression: No acute intracranial abnormality  Workstation performed: [id number]     CT cervical spine without contrast    Result Date: 4/22/2022  Impression: No cervical spine fracture or traumatic malalignment  Moderate multilevel spondylotic degenerative changes of the cervical spine  Workstation performed: [id number]       Recent Cultures (last 7 days):     Results from last 7 days   Lab Units 04/22/22 2208   BLOOD CULTURE  No Growth at 48 hrs  No Growth at 48 hrs         Last 24 Hours Medication List:   Current Facility-Administered Medications   Medication Dose Route Frequency Provider Last Rate    acetaminophen  650 mg Oral Q6H PRN Noel Shepard PA-C      buPROPion  450 mg Oral Daily Lianet Marroquin PA-C      heparin (porcine)  5,000 Units Subcutaneous [facility] Lianet Marroquin PA-C      hydroxychloroquine  200 mg Oral BID Yaz Benavides PA-C      lamoTRIgine  150 mg Oral Daily Lianet Marroquin PA-C      LORazepam  0 5 mg Intravenous Q6H PRN Lianet Marroquin PA-C      ondansetron  4 mg Intravenous Q6H PRN Lianet Marroquin PA-C      oxyCODONE  5 mg Oral Q4H PRN Lianet Marroquin PA-C      pantoprazole  40 mg Oral Early Morning Lianet Shea PA-C          Today, Patient Was Seen By: Rj Reynolds MD    ** Please Note: Dictation voice to text software may have been used in the creation of this document   **

## 2022-04-26 VITALS
BODY MASS INDEX: 24.07 KG/M2 | DIASTOLIC BLOOD PRESSURE: 84 MMHG | TEMPERATURE: 97.5 F | SYSTOLIC BLOOD PRESSURE: 140 MMHG | HEART RATE: 107 BPM | WEIGHT: 141 LBS | RESPIRATION RATE: 18 BRPM | HEIGHT: 64 IN | OXYGEN SATURATION: 96 %

## 2022-04-26 PROCEDURE — 97110 THERAPEUTIC EXERCISES: CPT

## 2022-04-26 PROCEDURE — 99232 SBSQ HOSP IP/OBS MODERATE 35: CPT | Performed by: NURSE PRACTITIONER

## 2022-04-26 PROCEDURE — 97116 GAIT TRAINING THERAPY: CPT

## 2022-04-26 PROCEDURE — 99239 HOSP IP/OBS DSCHRG MGMT >30: CPT | Performed by: STUDENT IN AN ORGANIZED HEALTH CARE EDUCATION/TRAINING PROGRAM

## 2022-04-26 RX ADMIN — HYDROXYCHLOROQUINE SULFATE 200 MG: 200 TABLET, FILM COATED ORAL at 08:25

## 2022-04-26 RX ADMIN — PANTOPRAZOLE SODIUM 40 MG: 40 TABLET, DELAYED RELEASE ORAL at 06:22

## 2022-04-26 RX ADMIN — LORAZEPAM 0.5 MG: 2 INJECTION INTRAMUSCULAR; INTRAVENOUS at 00:41

## 2022-04-26 RX ADMIN — OXYCODONE HYDROCHLORIDE 5 MG: 5 TABLET ORAL at 00:41

## 2022-04-26 RX ADMIN — OXYCODONE HYDROCHLORIDE 5 MG: 5 TABLET ORAL at 06:22

## 2022-04-26 RX ADMIN — HEPARIN SODIUM 5000 UNITS: 5000 INJECTION INTRAVENOUS; SUBCUTANEOUS at 06:22

## 2022-04-26 RX ADMIN — BUPROPION HYDROCHLORIDE 450 MG: 150 TABLET, FILM COATED, EXTENDED RELEASE ORAL at 08:25

## 2022-04-26 RX ADMIN — HEPARIN SODIUM 5000 UNITS: 5000 INJECTION INTRAVENOUS; SUBCUTANEOUS at 13:17

## 2022-04-26 RX ADMIN — LORAZEPAM 0.5 MG: 2 INJECTION INTRAMUSCULAR; INTRAVENOUS at 08:29

## 2022-04-26 RX ADMIN — MORPHINE SULFATE 15 MG: 15 TABLET, EXTENDED RELEASE ORAL at 08:24

## 2022-04-26 RX ADMIN — LAMOTRIGINE 150 MG: 100 TABLET ORAL at 08:24

## 2022-04-26 NOTE — PLAN OF CARE
Problem: PHYSICAL THERAPY ADULT  Goal: Performs mobility at highest level of function for planned discharge setting  See evaluation for individualized goals  Description: Treatment/Interventions: Functional transfer training,LE strengthening/ROM,Elevations,Therapeutic exercise,Endurance training,Cognitive reorientation,Patient/family training,Equipment eval/education,Gait training,Spoke to nursing  Equipment Recommended: Jem Vargas (Comment) (RW which she owns)       See flowsheet documentation for full assessment, interventions and recommendations  Outcome: Progressing  Note: Prognosis: Fair  Problem List: Decreased strength,Decreased range of motion,Decreased endurance,Impaired balance,Decreased mobility,Decreased cognition,Impaired judgement,Decreased safety awareness,Pain  Assessment: pt  progressing well with mobility  Noted improved gait and decreased m  spasms in the L rib area during ambualtion this session  Improved gait quality noted as well  Decreased assist with RW negotiation noted this session ; Pt  needed assist for LLE ROM in supine position  Pt  reported she did not want to sit in the chair post session  pt  positioned back in bed in supine position with all needs within reach and bed alarm engaged  Pt  continues with slow and occ  unsteady gait  and needed cues for directions and to stay within RW and centered during ambulation  Will continue per current PT POC to address the mobility deficits  Barriers to Discharge: Decreased caregiver support  Barriers to Discharge Comments: Patient has sustained 2 falls which have required medical attention in the past 2 months  PT Discharge Recommendation: Post acute rehabilitation services          See flowsheet documentation for full assessment

## 2022-04-26 NOTE — ASSESSMENT & PLAN NOTE
· Unwitnessed fall      · Patient states she tripped has had mechanical falls in the past  · 2D echo with ejection fraction 33%, type 1 diastolic dysfunction  · PT recommending rehab, patient in agreement  · Discharge to [facility] for [facility]

## 2022-04-26 NOTE — CASE MANAGEMENT
Case Management Discharge Planning Note    Patient name Whit Dumont  Location [address] [place] [address] /E2 -* MRN [id number]  : 1955 Date 2022       Current Admission Date: 2022  Current Admission Diagnosis:Acute metabolic encephalopathy   Patient Active Problem List    Diagnosis Date Noted    LAILA (acute kidney injury) ([facility] [address] ) 2022    Polypharmacy 2022    'light-for-dates' infant with signs of fetal malnutrition 2022    Bipolar depression ([facility] [address] ) 2022    Rheumatoid arthritis ([facility] [address] ) 2022    Closed fracture of multiple ribs of right side 2022    Traumatic pneumothorax 2022    Closed fracture of transverse process of lumbar vertebra ([facility] [address] ) 2022    Liver contusion 2022    Fall 2022    Acute pain due to trauma 2022    Right shoulder pain 2022    Severe protein-calorie malnutrition ([facility] [address] ) 2022    Undifferentiated connective tissue disease ([facility] [address] ) 2021    Primary generalized (osteo)arthritis 2021    Peripheral neuropathy 2021    Senile osteoporosis 2021    Hx of compression fracture of spine 2021    Lumbar spondylosis 2021    Cervical spondylosis 2021    Anemia 2021    Chronic pain syndrome     Asthma     Ambulatory dysfunction     Acute metabolic encephalopathy     Acute cystitis without hematuria     Seronegative arthropathy of multiple sites ([facility] [address] )     Gastroesophageal reflux disease without esophagitis     Mixed stress and urge urinary incontinence     CHF (congestive heart failure) ([facility])       LOS (days): 3  Geometric Mean LOS (GMLOS) (days): 4 30  Days to GMLOS:0 9     OBJECTIVE:  Risk of Unplanned Readmission Score: 20      Current admission status: Inpatient   Preferred Pharmacy:   [address] [address] Megan Johnson  [address] [place] [address]  Phone: [phone] Fax: [phone]    Primary Care Provider: Сергей Rico DO    Primary Insurance: MEDICARE  Secondary Insurance: AARP    DISCHARGE DETAILS:    Discharge planning discussed with[de-identified] Daughter  Freedom of Choice: Yes  Comments - Freedom of Choice: CM received VM from dtr reporting that her uncle is able to transport pt at [address] attempted to call back but was able to leave a VM confirming that this time would be fine  CM left call back number if she were to have any questions    CM contacted family/caregiver?: Yes     Contacts  Patient Contacts: Geovanna Ruiz  Relationship to Patient[de-identified] Family  Contact Method: Phone  Phone Number: [phone]  Reason/Outcome: Continuity of [address]     Discharge Destination Plan[de-identified] Short Term Rehab  Transport at Discharge : Family     ETA of Transport (Date): 04/26/22  ETA of Transport (Time): [address] Name, [facility]  Receiving Facility/Agency Phone Number: [phone]  Facility/Agency Fax Number: [phone]

## 2022-04-26 NOTE — PHYSICAL THERAPY NOTE
Physical Therapy Treatment Note     04/26/22 0933   PT Last Visit   PT Visit Date 04/26/22   Note Type   Note Type Treatment   Pain Assessment   Pain Assessment Tool 0-10   Pain Score 10 - Worst Possible Pain   Pain Location/Orientation Orientation: Left; Location: Rib Cage   Restrictions/Precautions   Weight Bearing Precautions Per Order No   Other Precautions Cognitive; Fall Risk;Pain; Impulsive   General   Chart Reviewed Yes   Family/Caregiver Present No   Subjective   Subjective pt  in bed upon entry  Agreeable to PT   Bed Mobility   Supine to Sit 4  Minimal assistance   Additional items HOB elevated;Assist x 1;Verbal cues; Increased time required   Sit to Supine 5  Supervision   Additional items Assist x 1;Bedrails; Increased time required   Transfers   Sit to Stand 4  Minimal assistance   Additional items Assist x 1; Armrests; Increased time required   Stand to Sit 5  Supervision   Additional items Assist x 1   Stand pivot 4  Minimal assistance   Additional items Assist x 1; Increased time required;Verbal cues   Ambulation/Elevation   Gait pattern Decreased foot clearance; Foward flexed; Inconsistent anna; Excessively slow; Short stride;Decreased heel strike   Gait Assistance 4  Minimal assist   Additional items Assist x 1;Verbal cues   Assistive Device Rolling walker   Distance 100ft, 80ft, 20ft   Stair Management Assistance 4  Minimal assist   Additional items Assist x 1;Verbal cues; Increased time required   Stair Management Technique Two rails; Step to pattern; Foreward;Nonreciprocal   Number of Stairs 3   Balance   Static Sitting Good   Dynamic Sitting Fair +   Static Standing Fair   Dynamic Standing Fair -   Ambulatory Fair -   Endurance Deficit   Endurance Deficit Yes   Endurance Deficit Description pain   Activity Tolerance   Activity Tolerance Patient tolerated treatment well;Patient limited by pain   Nurse Made Aware Yes   Exercises   TKR Supine;Sitting;Bilateral;Right;AROM;AAROM;20 reps  (AA for LLE) Assessment   Prognosis Fair   Problem List Decreased strength;Decreased range of motion;Decreased endurance; Impaired balance;Decreased mobility; Decreased cognition; Impaired judgement;Decreased safety awareness;Pain   Assessment pt  progressing well with mobility  Noted improved gait and decreased m  spasms in the L rib area during ambualtion this session  Improved gait quality noted as well  Decreased assist with RW negotiation noted this session ; Pt  needed assist for LLE ROM in supine position  Pt  reported she did not want to sit in the chair post session  pt  positioned back in bed in supine position with all needs within reach and bed alarm engaged  Pt  continues with slow and occ  unsteady gait  and needed cues for directions and to stay within RW and centered during ambulation  Will continue per current PT POC to address the mobility deficits   Barriers to Discharge Decreased caregiver support   Goals   Patient Goals I have hope now   STG Expiration Date 05/04/22   PT Treatment Day 3   Plan   Treatment/Interventions Functional transfer training;LE strengthening/ROM; Elevations; Therapeutic exercise;Spoke to nursing;Gait training;Bed mobility; Equipment eval/education;Patient/family training   Progress Progressing toward goals   PT Frequency 3-5x/wk   Recommendation   PT Discharge Recommendation Post acute rehabilitation services   Equipment Recommended [address] Mobility Inpatient   Turning in Bed Without Bedrails 3   Lying on Back to Sitting on Edge of Flat Bed 4   Moving Bed to Chair 3   Standing Up From Chair 3   Walk in Room 3   Climb 3-5 Stairs 3   Basic Mobility Inpatient Raw Score 19   Basic Mobility Standardized Score 42 48   Highest Level Of Mobility   JH-HLM Goal 6: Walk 10 steps or more   JH-HLM Highest Level of Mobility 7: Walk 25 feet or more   JH-HLM Goal Achieved Yes   End of Consult   Patient Position at End of Consult Supine; All needs within reach;Bed/Chair alarm activated Mario , PTA    An AM-PAC basic mobility standardized score less than 42 9 suggest the patient may benefit from discharge to post-acute rehab services

## 2022-04-26 NOTE — DISCHARGE INSTRUCTIONS
Please make appointment to follow up with primary psychiatrist outpatient to review current meds  Recommend compliance with home meds

## 2022-04-26 NOTE — ASSESSMENT & PLAN NOTE
this is 79-year-old female with history of chronic pain, bipolar disorder had a recent fall with multiple displaced rib fractures admitted to [facility] under trauma service from 03/01 through 3/8  Patient was also found to have a pneumothorax and liver contusion conservatively  Patient lives alone presented to the ED with fall at home  Patient does admit to taking an extra dose of lorazepam  According to patient's daughter patient has been confused and having hallucinations at home for the past 1 week  There is concern the patient is not able to take care of herself and not take her medications appropriately  Patient was given Narcan in the ED for somnolence    · Likely secondary to medication noncompliance and abuse  · Awake alert oriented x3, answering questions appropriately  · Reports hallucinations has improved/resolved  · CT head and neck and CT spine negative for any acute abnormality  · Appreciate psych evaluation, continue current medications  · Patient will need outpatient follow-up with Psychiatry for medication adjustment  · Discussed with geriatrics, opioid withdrawal may have played a role, PDMP confirmed chronic use of ms contin BID   resumed

## 2022-04-26 NOTE — PLAN OF CARE
Problem: Potential for Falls  Goal: Patient will remain free of falls  Description: INTERVENTIONS:  - Educate patient/family on patient safety including physical limitations  - Instruct patient to call for assistance with activity   - Consult OT/PT to assist with strengthening/mobility   - Keep Call bell within reach  - Keep bed low and locked with side rails adjusted as appropriate  - Keep care items and personal belongings within reach  - Initiate and maintain comfort rounds  - Make Fall Risk Sign visible to staff  - Offer Toileting every 2 Hours, in advance of need  - Initiate/Maintain  bed/chair alarm  - Obtain necessary fall risk management equipment: bedside commode, walker, gripper socks, bed alarm, call bell, rounds, bedpan  - Apply yellow socks and bracelet for high fall risk patients  - Consider moving patient to room near nurses station  Outcome: Progressing     Problem: MOBILITY - ADULT  Goal: Maintain or return to baseline ADL function  Description: INTERVENTIONS:  -  Assess patient's ability to carry out ADLs; assess patient's baseline for ADL function and identify physical deficits which impact ability to perform ADLs (bathing, care of mouth/teeth, toileting, grooming, dressing, etc )  - Assess/evaluate cause of self-care deficits   - Assess range of motion  - Assess patient's mobility; develop plan if impaired  - Assess patient's need for assistive devices and provide as appropriate  - Encourage maximum independence but intervene and supervise when necessary  - Involve family in performance of ADLs  - Assess for home care needs following discharge   - Consider OT consult to assist with ADL evaluation and planning for discharge  - Provide patient education as appropriate  Outcome: Progressing     Problem: PAIN - ADULT  Goal: Verbalizes/displays adequate comfort level or baseline comfort level  Description: Interventions:  - Encourage patient to monitor pain and request assistance  - Assess pain using appropriate pain scale  - Administer analgesics based on type and severity of pain and evaluate response  - Implement non-pharmacological measures as appropriate and evaluate response  - Consider cultural and social influences on pain and pain management  - Notify physician/advanced practitioner if interventions unsuccessful or patient reports new pain  Outcome: Progressing     Problem: INFECTION - ADULT  Goal: Absence or prevention of progression during hospitalization  Description: INTERVENTIONS:  - Assess and monitor for signs and symptoms of infection  - Monitor lab/diagnostic results  - Monitor all insertion sites  - Administer medications as ordered  - Instruct and encourage patient to use good hand hygiene technique  Outcome: Progressing     Problem: DISCHARGE PLANNING  Goal: Discharge to home or other facility with appropriate resources  Description: INTERVENTIONS:  - Identify barriers to discharge w/patient and caregiver  - Arrange for needed discharge resources and transportation as appropriate  - Identify discharge learning needs   - Refer to Case Management Department for coordinating discharge planning if the patient needs post-hospital services based on physician/advanced practitioner order or complex needs related to functional status, cognitive ability, or social support system  Outcome: Progressing     Problem: Knowledge Deficit  Goal: Patient/family/caregiver demonstrates understanding of disease process, treatment plan, medications, and discharge instructions  Description: Complete learning assessment and assess knowledge base    Interventions:  - Provide teaching at level of understanding  - Provide teaching via preferred learning methods  Outcome: Progressing     Problem: NEUROSENSORY - ADULT  Goal: Achieves stable or improved neurological status  Description: INTERVENTIONS  - Monitor and report changes in neurological status  - Monitor vital signs such as temperature, blood pressure, glucose, and any other labs ordered   - Monitor for seizure activity and implement precautions if appropriate      Outcome: Progressing     Problem: CARDIOVASCULAR - ADULT  Goal: Maintains optimal cardiac output and hemodynamic stability  Description: INTERVENTIONS:  - Monitor I/O, vital signs and rhythm  - Monitor for S/S and trends of decreased cardiac output  - Administer and titrate ordered vasoactive medications to optimize hemodynamic stability  - Assess quality of pulses, skin color and temperature  - Assess for signs of decreased coronary artery perfusion  - Instruct patient to report change in severity of symptoms  Outcome: Progressing  Goal: Absence of cardiac dysrhythmias or at baseline rhythm  Description: INTERVENTIONS:  - Continuous cardiac monitoring, vital signs, obtain 12 lead EKG if ordered  - Administer antiarrhythmic and heart rate control medications as ordered  - Monitor electrolytes and administer replacement therapy as ordered  Outcome: Progressing     Problem: RESPIRATORY - ADULT  Goal: Achieves optimal ventilation and oxygenation  Description: INTERVENTIONS:  - Assess for changes in respiratory status  - Assess for changes in mentation and behavior  - Position to facilitate oxygenation and minimize respiratory effort  - Oxygen administered by appropriate delivery if ordered  - Encourage broncho-pulmonary hygiene including cough, deep breathe, Incentive Spirometry  - Assess and instruct to report SOB or any respiratory difficulty  - Respiratory Therapy support as indicated  Outcome: Progressing     Problem: Prexisting or High Potential for Compromised Skin Integrity  Goal: Skin integrity is maintained or improved  Description: INTERVENTIONS:  - Identify patients at risk for skin breakdown  - Assess and monitor skin integrity  - Assess and monitor nutrition and hydration status  - Monitor labs   - Assess for incontinence   - Assist patient to turn and reposition every 2 hours while in bed  - Assist with mobility/ambulation  - Relieve pressure over bony prominences  - Avoid friction and shearing  - Provide appropriate hygiene as needed including keeping skin clean and dry  - Evaluate need for skin moisturizer/barrier cream  - Collaborate with interdisciplinary team   - Patient teaching  Outcome: Progressing     Problem: COPING  Goal: Pt able to verbalize concerns and demonstrate effective coping strategies  Description: INTERVENTIONS:  - Assist patient to identify coping skills, available support systems and cultural and spiritual values  - Provide emotional support, including active listening and acknowledgement of concerns of patient and caregivers  - Reduce environmental stimuli, as able  - Provide patient education  - Assess for spiritual pain/suffering and initiate spiritual care, including notification of Pastoral Care or kyleigh based community as needed  - Assess effectiveness of coping strategies  Outcome: Progressing     Problem: CONFUSION/THOUGHT DISTURBANCE  Goal: Thought disturbances (confusion, delirium, depression, dementia or psychosis) are managed to maintain or return to baseline mental status and functional level  Description: INTERVENTIONS:  - Assess for possible contributors to  thought disturbance, including but not limited to medications, infection, impaired vision or hearing, underlying metabolic abnormalities, dehydration, respiratory compromise,  psychiatric diagnoses and notify attending PHYSICAN/AP  - Monitor and intervene to maintain adequate nutrition, hydration, elimination, sleep and activity  - Decrease environmental stimuli, including noise as appropriate  - Provide frequent contacts to provide refocusing, direction and reassurance as needed  Approach patient calmly with eye contact and at their level    - [place] high risk fall precautions, aspiration precautions and other safety measures, as indicated  - If delirium suspected, notify physician/AP of change in condition and request immediate in-person evaluation  - Pursue consults as appropriate including Geriatric (campus dependent), OT for cognitive evaluation/activity planning, psychiatric, pastoral care, etc   Outcome: Progressing

## 2022-04-26 NOTE — DISCHARGE SUMMARY
[facility]  Discharge- Mary Read 1955, 78 y o  female MRN: [id number]  Unit/Bed#: E2 -01 Encounter: [id number]  Primary Care Provider: Omega Caldwell DO   Date and time admitted to hospital: 4/22/2022  9:30 PM    * Acute metabolic encephalopathy  Assessment & Plan  this is 58-year-old female with history of chronic pain, bipolar disorder had a recent fall with multiple displaced rib fractures admitted to [facility] under trauma service from 03/01 through 3/8  Patient was also found to have a pneumothorax and liver contusion conservatively  Patient lives alone presented to the ED with fall at home  Patient does admit to taking an extra dose of lorazepam  According to patient's daughter patient has been confused and having hallucinations at home for the past 1 week  There is concern the patient is not able to take care of herself and not take her medications appropriately  Patient was given Narcan in the ED for somnolence    · Likely secondary to medication noncompliance and abuse  · Awake alert oriented x3, answering questions appropriately  · Reports hallucinations has improved/resolved  · CT head and neck and CT spine negative for any acute abnormality  · Appreciate psych evaluation, continue current medications  · Patient will need outpatient follow-up with Psychiatry for medication adjustment  · Discussed with geriatrics, opioid withdrawal may have played a role, PDMP confirmed chronic use of ms contin BID   resumed    Polypharmacy  Assessment & Plan  · Suspected by ams and lethargy w/hypoxia and hypercapnia responsive to narcan and fall w/recent admission for fall resultant in multiple broken ribs and liver contusion  · Geriatric consultation and recommendations appreciated    LAILA (acute kidney injury) ([facility] [address] )  Assessment & Plan  · Resolved with IVFs    Rheumatoid arthritis ([facility] [address] )  Assessment & Plan  · Continue hydroxychloroquine    Bipolar depression [facility])  Assessment & Plan  · Continue lamictal/wellbutrin  · Daughter had concerned the patient is not taking medications appropriately and is having hallucinations  · Psychiatry consultation appreciated  · Recommended to continue current regimen and follow closely outpatient    Severe protein-calorie malnutrition [facility])  Assessment & Plan  Malnutrition Findings:     Consult nutrition    BMI Findings: Body mass index is 24 2 kg/m²  Fall  Assessment & Plan  · Unwitnessed fall  · Patient states she tripped has had mechanical falls in the past  · 2D echo with ejection fraction 02%, type 1 diastolic dysfunction  · PT recommending rehab, patient in agreement  · Discharge to [place] for rehab    Chronic pain syndrome  Assessment & Plan  Continue follow-up pain management outpatient  PDMP reviewed  Continue MS Contin 15 mg ER b i d , oxycodone p r n  Addendum:  Acute Respiratory Failure with Hypercapnia and Hypoxia, evidenced by O2 sats drop to 50%, VBG pH  7 111, PCO2 77 1, requiring 15L NRB Mask, Narcan and follow up, VBG  Findings:   VBG after Narcan and O2;  pH  7 338  pCO2  37 2   pO2 21 1  HCO3  19 5  O2 HGB 37 0%     Toxic Encephalopathy due to Overdose of Opioids, evidenced by confusion, somnolence and visual hallucinations, treated with Narcan, Geriatric Medicine consult and [address] consult and continued pain medication regime with MS Contin 15mg every 12 hours and Oxycodone IR 5mg prn  Findings: Documentation in H/P;  improved significantly w/narcan w/rapid improvement in lethargy/somnolence  Documentation on 4/23 by Dr Genet Phillip;  She denies use of other substances to me but had admitted to nursing that it was possible she may have overdosed on opioids        Discharging Physician / Practitioner: Audie Henriquez MD  PCP: Ronnell Walker DO  Admission Date:   Admission Orders (From admission, onward)     Ordered        04/23/22 0003  Inpatient Admission  Once Discharge Date: 04/26/22    Medical Problems             Resolved Problems  Date Reviewed: 4/26/2022    None                Consultations During Hospital Stay:  · Geriatrics  · Psychiatry    Procedures Performed:   · none    Significant Findings / Test Results:   CT abdomen pelvis wo contrast    Result Date: 4/22/2022  Impression: 1  Stable right posterior 10th and 11th and 12 rib fractures and chronic T11, L1 and L4 fractures  2   No evidence of acute intra-abdominal or pelvic trauma  Workstation performed: [id number]     XR chest 1 view portable    Result Date: 4/23/2022  Impression: No acute cardiopulmonary disease  Workstation performed: [id number]     XR hip/pelv 2-3 vws left    Result Date: 4/23/2022  Impression: No acute osseous abnormality  Workstation performed: [id number]     CT head without contrast    Result Date: 4/22/2022  Impression: No acute intracranial abnormality  Workstation performed: [id number]     CT cervical spine without contrast    Result Date: 4/22/2022  · Impression: No cervical spine fracture or traumatic malalignment  Moderate multilevel spondylotic degenerative changes of the cervical spine  Workstation performed: [id number]   ·     Incidental Findings:   · none     Test Results Pending at Discharge (will require follow up):   · none     Outpatient Tests Requested:  · none    Complications:  none    Reason for Admission: [address] Course:     Anisha Gibson is a 78 y o  female patient who originally presented to the hospital on 4/22/2022 due to fall and altered mental status  Patient was noted to be confused, reported having hallucinations for the past week  She typically lives alone  Recently had a fall back in March and discharge from [facility] after being evaluated by Trauma Service with pneumothorax and liver contusion    Patient did have ongoing hallucinations, Psychiatry was consulted, recommending continue home meds as there was a question if patient had been noncompliant for the past week versus polypharmacy  Geriatrics was also consulted, recommended resuming her MS Contin as she may have been in withdrawal since she has been on it for greater than 1 month  She was monitored over course of several days, with gradual improvement in her mental status  Physical therapy recommended rehab  Family was in agreement and patient was discharged to [place]  Please see above list of diagnoses and related plan for additional information  Condition at Discharge: fair     Discharge Day Visit / Exam:     Subjective:  Patient reports doing well, no complaints  Had a good night without any issues  Denies any more hallucinations  Her pain is well controlled  Tolerating diet  Vitals: Blood Pressure: 140/84 (04/26/22 0743)  Pulse: (!) 107 (04/26/22 0743)  Temperature: 97 5 °F (36 4 °C) (04/26/22 0743)  Temp Source: Temporal (04/26/22 0743)  Respirations: 18 (04/26/22 0743)  Height: 5' 4" (162 6 cm) (04/23/22 1025)  Weight - Scale: 64 kg (141 lb) (04/23/22 1025)  SpO2: 96 % (04/26/22 0743)  Exam:   Physical Exam  Vitals and nursing note reviewed  Constitutional:       General: She is not in acute distress  Appearance: Normal appearance  She is normal weight  She is not ill-appearing  Comments: Appears older than stated age   HENT:      Head: Normocephalic and atraumatic  Eyes:      Conjunctiva/sclera: Conjunctivae normal    Cardiovascular:      Rate and Rhythm: Normal rate and regular rhythm  Pulmonary:      Effort: Pulmonary effort is normal       Breath sounds: No wheezing or rhonchi  Abdominal:      General: Bowel sounds are normal  There is no distension  Palpations: Abdomen is soft  Musculoskeletal:         General: No swelling  Right lower leg: No edema  Left lower leg: No edema  Skin:     General: Skin is warm and dry  Neurological:      General: No focal deficit present  Mental Status: She is alert   Mental status is at baseline  Discussion with Family: left vm for daughter    Discharge instructions/Information to patient and family:   See after visit summary for information provided to patient and family  Provisions for Follow-Up Care:  See after visit summary for information related to follow-up care and any pertinent home health orders  Disposition:     Acute Rehab at [facility] Readmission: none     Discharge Statement:  I spent 35 minutes discharging the patient  This time was spent on the day of discharge  I had direct contact with the patient on the day of discharge  Greater than 50% of the total time was spent examining patient, answering all patient questions, arranging and discussing plan of care with patient as well as directly providing post-discharge instructions  Additional time then spent on discharge activities  Discharge Medications:  See after visit summary for reconciled discharge medications provided to patient and family        ** Please Note: This note has been constructed using a voice recognition system **

## 2022-04-26 NOTE — PROGRESS NOTES
Progress Note - Makenzie Oneill 78 y o  female MRN: [id number]    Unit/Bed#: E2 -01 Encounter: [id number]      Assessment/Plan:  1  Ambulatory dysfunction s/p fall  · PT OT recommending STR  Patient to agreeable  Fall precautions  2  Deconditioning/frailty  · Cont to optimize diet, hydration, mobility for healing  · Cont to monitor po intake and consider nutrition eval if needed  · Monitor ss infection, dehydration, dvt, skin breakdown  Encourage c&db  3  Forgetfulness  · ST memory loss per pt  Recommend MOCA check in rehab and f/u with geriatrics as needed  · Cont frequent reminders, close monitoring for safety  4  Acute encephalopathy w/hallucinations  · Improving  Likely r/t polypharm and acute condition at home  · Cont delirium precautions as previously written  5   Acute on chronic pain  · Pain control at baseline with ms contin  · Avoid muscle relaxants and benzos in geriatric population  · Using occ oxyIR for BTP  - monitor constipation  · Cont nonpharm methods of pain control  · Discussed with pt - in future would recommend consulting with pain specialist when acute pain to review medication regimen and their recommendations  6   Anxiety/depression/bipolar  · Pt reporting mood much improved  · Cont Wellbutrin, Lamictal  · Recommend cont GDR ativan    Subjective:   Patient seen for geriatrics follow up  She just finished am care  She state she feels much better  Pain under control  She does report continued left back upper hip pain  She denies cp/sob/cough  She denies gi/gu distress  She states sleep was good, appetite is not great (chronically isn't)  She states some nausea last night, but was hungry - no constipation  She denies hallucinations at present  She states anxiety/depression much better under control today  Objective:     Vitals: Blood pressure 140/84, pulse (!) 107, temperature 97 5 °F (36 4 °C), temperature source Temporal, resp   rate 18, height 5' 4" (1 626 m), weight 64 kg (141 lb), SpO2 96 %  ,Body mass index is 24 2 kg/m²  No intake or output data in the 24 hours ending 04/26/22 1146    Physical Exam:   General:  Alert, oriented x 3, no distress  Cards:  RRR, no murmur/gallop/rub noted  Pulm:  Norm effort/no distress, cta, no w/r/r  Abd:  Soft, nt, nd, +bs  MS:  Norm ROM, no focal weakness  Ext:  W/d, no edema - bruising on forehead, arms       Invasive Devices  Report    Peripheral Intravenous Line            Peripheral IV 04/25/22 Distal;Dorsal (posterior); Right Forearm <1 day                Lab, Imaging and other studies: I have personally reviewed pertinent reports      VTE Pharmacologic Prophylaxis: Heparin

## 2022-04-28 LAB
BACTERIA BLD CULT: NORMAL
BACTERIA BLD CULT: NORMAL

## 2022-05-10 ENCOUNTER — HOME HEALTH ADMISSION (OUTPATIENT)
Dept: HOME HEALTH SERVICES | Facility: HOME HEALTHCARE | Age: 67
End: 2022-05-10
Payer: MEDICARE

## 2022-05-10 ENCOUNTER — TRANSCRIBE ORDERS (OUTPATIENT)
Dept: HOME HEALTH SERVICES | Facility: HOME HEALTHCARE | Age: 67
End: 2022-05-10

## 2022-05-10 DIAGNOSIS — G93.41 METABOLIC ENCEPHALOPATHY: Primary | ICD-10-CM

## 2022-05-12 ENCOUNTER — HOME CARE VISIT (OUTPATIENT)
Dept: HOME HEALTH SERVICES | Facility: HOME HEALTHCARE | Age: 67
End: 2022-05-12
Payer: MEDICARE

## 2022-05-12 PROCEDURE — 10330081 VN NO-PAY CLAIM PROCEDURE

## 2022-05-12 PROCEDURE — G0299 HHS/HOSPICE OF RN EA 15 MIN: HCPCS

## 2022-05-12 PROCEDURE — 400013 VN SOC

## 2022-05-13 ENCOUNTER — HOME CARE VISIT (OUTPATIENT)
Dept: HOME HEALTH SERVICES | Facility: HOME HEALTHCARE | Age: 67
End: 2022-05-13
Payer: MEDICARE

## 2022-05-13 VITALS
SYSTOLIC BLOOD PRESSURE: 116 MMHG | HEART RATE: 102 BPM | OXYGEN SATURATION: 97 % | DIASTOLIC BLOOD PRESSURE: 70 MMHG | WEIGHT: 134 LBS | HEIGHT: 67 IN | BODY MASS INDEX: 21.03 KG/M2

## 2022-05-13 VITALS
TEMPERATURE: 97.2 F | DIASTOLIC BLOOD PRESSURE: 78 MMHG | SYSTOLIC BLOOD PRESSURE: 124 MMHG | RESPIRATION RATE: 16 BRPM | HEART RATE: 94 BPM

## 2022-05-13 PROCEDURE — G0152 HHCP-SERV OF OT,EA 15 MIN: HCPCS

## 2022-05-13 PROCEDURE — G0151 HHCP-SERV OF PT,EA 15 MIN: HCPCS

## 2022-05-15 VITALS — SYSTOLIC BLOOD PRESSURE: 102 MMHG | DIASTOLIC BLOOD PRESSURE: 60 MMHG | OXYGEN SATURATION: 95 % | HEART RATE: 89 BPM

## 2022-05-16 ENCOUNTER — HOME CARE VISIT (OUTPATIENT)
Dept: HOME HEALTH SERVICES | Facility: HOME HEALTHCARE | Age: 67
End: 2022-05-16
Payer: MEDICARE

## 2022-05-16 PROCEDURE — G0151 HHCP-SERV OF PT,EA 15 MIN: HCPCS

## 2022-05-16 NOTE — CASE COMMUNICATION
OT performed evaluation visit on 5/13/22  OT to continue to follow patient 2 x per wk x 4 wks for therapeutic exercise, ADL training, review of safety with transfers/mobility and recommendations for DME as appropriate

## 2022-05-18 ENCOUNTER — HOME CARE VISIT (OUTPATIENT)
Dept: HOME HEALTH SERVICES | Facility: HOME HEALTHCARE | Age: 67
End: 2022-05-18
Payer: MEDICARE

## 2022-05-18 VITALS
DIASTOLIC BLOOD PRESSURE: 72 MMHG | OXYGEN SATURATION: 96 % | SYSTOLIC BLOOD PRESSURE: 128 MMHG | HEART RATE: 72 BPM | RESPIRATION RATE: 18 BRPM

## 2022-05-18 PROCEDURE — G0151 HHCP-SERV OF PT,EA 15 MIN: HCPCS

## 2022-05-18 PROCEDURE — G0158 HHC OT ASSISTANT EA 15: HCPCS

## 2022-05-19 ENCOUNTER — HOME CARE VISIT (OUTPATIENT)
Dept: HOME HEALTH SERVICES | Facility: HOME HEALTHCARE | Age: 67
End: 2022-05-19
Payer: MEDICARE

## 2022-05-19 VITALS
TEMPERATURE: 97.7 F | SYSTOLIC BLOOD PRESSURE: 118 MMHG | RESPIRATION RATE: 18 BRPM | OXYGEN SATURATION: 98 % | HEART RATE: 88 BPM | DIASTOLIC BLOOD PRESSURE: 74 MMHG

## 2022-05-19 VITALS — DIASTOLIC BLOOD PRESSURE: 74 MMHG | SYSTOLIC BLOOD PRESSURE: 118 MMHG

## 2022-05-19 PROCEDURE — G0299 HHS/HOSPICE OF RN EA 15 MIN: HCPCS

## 2022-05-19 PROCEDURE — G0158 HHC OT ASSISTANT EA 15: HCPCS

## 2022-05-21 VITALS — DIASTOLIC BLOOD PRESSURE: 72 MMHG | OXYGEN SATURATION: 97 % | HEART RATE: 72 BPM | SYSTOLIC BLOOD PRESSURE: 128 MMHG

## 2022-05-23 ENCOUNTER — HOME CARE VISIT (OUTPATIENT)
Dept: HOME HEALTH SERVICES | Facility: HOME HEALTHCARE | Age: 67
End: 2022-05-23
Payer: MEDICARE

## 2022-05-23 VITALS — HEART RATE: 85 BPM | SYSTOLIC BLOOD PRESSURE: 102 MMHG | DIASTOLIC BLOOD PRESSURE: 70 MMHG | OXYGEN SATURATION: 95 %

## 2022-05-23 VITALS
RESPIRATION RATE: 18 BRPM | HEART RATE: 70 BPM | OXYGEN SATURATION: 98 % | DIASTOLIC BLOOD PRESSURE: 72 MMHG | SYSTOLIC BLOOD PRESSURE: 126 MMHG

## 2022-05-23 PROCEDURE — G0151 HHCP-SERV OF PT,EA 15 MIN: HCPCS

## 2022-05-23 PROCEDURE — G0158 HHC OT ASSISTANT EA 15: HCPCS

## 2022-05-25 ENCOUNTER — HOME CARE VISIT (OUTPATIENT)
Dept: HOME HEALTH SERVICES | Facility: HOME HEALTHCARE | Age: 67
End: 2022-05-25
Payer: MEDICARE

## 2022-05-25 PROCEDURE — G0151 HHCP-SERV OF PT,EA 15 MIN: HCPCS

## 2022-05-26 ENCOUNTER — HOME CARE VISIT (OUTPATIENT)
Dept: HOME HEALTH SERVICES | Facility: HOME HEALTHCARE | Age: 67
End: 2022-05-26
Payer: MEDICARE

## 2022-05-26 VITALS — SYSTOLIC BLOOD PRESSURE: 118 MMHG | DIASTOLIC BLOOD PRESSURE: 72 MMHG | OXYGEN SATURATION: 99 % | HEART RATE: 72 BPM

## 2022-05-26 NOTE — CASE COMMUNICATION
OT out of compliance c orders  Pt called OT on 5 26 to cancel OT session for this date and does not want to reschedule until next week  Pt reports having a PCP appt on 5 26 due to a possible UTI and will not be able to schedule OT due to the appt

## 2022-05-31 ENCOUNTER — HOME CARE VISIT (OUTPATIENT)
Dept: HOME HEALTH SERVICES | Facility: HOME HEALTHCARE | Age: 67
End: 2022-05-31
Payer: MEDICARE

## 2022-05-31 VITALS — SYSTOLIC BLOOD PRESSURE: 107 MMHG | OXYGEN SATURATION: 97 % | HEART RATE: 89 BPM | DIASTOLIC BLOOD PRESSURE: 66 MMHG

## 2022-05-31 PROCEDURE — G0158 HHC OT ASSISTANT EA 15: HCPCS

## 2022-06-01 ENCOUNTER — HOME CARE VISIT (OUTPATIENT)
Dept: HOME HEALTH SERVICES | Facility: HOME HEALTHCARE | Age: 67
End: 2022-06-01
Payer: MEDICARE

## 2022-06-01 VITALS
SYSTOLIC BLOOD PRESSURE: 122 MMHG | DIASTOLIC BLOOD PRESSURE: 66 MMHG | OXYGEN SATURATION: 96 % | HEART RATE: 74 BPM | RESPIRATION RATE: 20 BRPM

## 2022-06-01 PROCEDURE — G0151 HHCP-SERV OF PT,EA 15 MIN: HCPCS

## 2022-06-02 ENCOUNTER — HOME CARE VISIT (OUTPATIENT)
Dept: HOME HEALTH SERVICES | Facility: HOME HEALTHCARE | Age: 67
End: 2022-06-02
Payer: MEDICARE

## 2022-06-02 VITALS — DIASTOLIC BLOOD PRESSURE: 76 MMHG | SYSTOLIC BLOOD PRESSURE: 116 MMHG | HEART RATE: 81 BPM | OXYGEN SATURATION: 98 %

## 2022-06-02 PROCEDURE — G0158 HHC OT ASSISTANT EA 15: HCPCS

## 2022-06-03 ENCOUNTER — HOME CARE VISIT (OUTPATIENT)
Dept: HOME HEALTH SERVICES | Facility: HOME HEALTHCARE | Age: 67
End: 2022-06-03
Payer: MEDICARE

## 2022-06-03 PROCEDURE — G0151 HHCP-SERV OF PT,EA 15 MIN: HCPCS

## 2022-06-06 ENCOUNTER — HOME CARE VISIT (OUTPATIENT)
Dept: HOME HEALTH SERVICES | Facility: HOME HEALTHCARE | Age: 67
End: 2022-06-06
Payer: MEDICARE

## 2022-06-06 VITALS — HEART RATE: 72 BPM | DIASTOLIC BLOOD PRESSURE: 72 MMHG | OXYGEN SATURATION: 98 % | SYSTOLIC BLOOD PRESSURE: 128 MMHG

## 2022-06-06 PROCEDURE — G0151 HHCP-SERV OF PT,EA 15 MIN: HCPCS

## 2022-06-06 PROCEDURE — G0158 HHC OT ASSISTANT EA 15: HCPCS

## 2022-06-07 VITALS — SYSTOLIC BLOOD PRESSURE: 124 MMHG | HEART RATE: 68 BPM | OXYGEN SATURATION: 99 % | DIASTOLIC BLOOD PRESSURE: 66 MMHG

## 2022-06-08 ENCOUNTER — HOME CARE VISIT (OUTPATIENT)
Dept: HOME HEALTH SERVICES | Facility: HOME HEALTHCARE | Age: 67
End: 2022-06-08
Payer: MEDICARE

## 2022-06-08 VITALS
SYSTOLIC BLOOD PRESSURE: 132 MMHG | DIASTOLIC BLOOD PRESSURE: 70 MMHG | OXYGEN SATURATION: 98 % | HEART RATE: 74 BPM | RESPIRATION RATE: 20 BRPM

## 2022-06-08 PROCEDURE — G0151 HHCP-SERV OF PT,EA 15 MIN: HCPCS

## 2022-06-09 ENCOUNTER — HOME CARE VISIT (OUTPATIENT)
Dept: HOME HEALTH SERVICES | Facility: HOME HEALTHCARE | Age: 67
End: 2022-06-09
Payer: MEDICARE

## 2022-06-09 VITALS — DIASTOLIC BLOOD PRESSURE: 60 MMHG | HEART RATE: 60 BPM | SYSTOLIC BLOOD PRESSURE: 102 MMHG | OXYGEN SATURATION: 92 %

## 2022-06-09 PROCEDURE — G0152 HHCP-SERV OF OT,EA 15 MIN: HCPCS

## 2022-06-13 ENCOUNTER — HOME CARE VISIT (OUTPATIENT)
Dept: HOME HEALTH SERVICES | Facility: HOME HEALTHCARE | Age: 67
End: 2022-06-13
Payer: MEDICARE

## 2022-06-13 PROCEDURE — G0151 HHCP-SERV OF PT,EA 15 MIN: HCPCS

## 2022-06-13 PROCEDURE — 400013 VN SOC

## 2022-06-14 VITALS
DIASTOLIC BLOOD PRESSURE: 68 MMHG | RESPIRATION RATE: 18 BRPM | OXYGEN SATURATION: 99 % | HEART RATE: 76 BPM | SYSTOLIC BLOOD PRESSURE: 122 MMHG

## 2023-01-01 ENCOUNTER — APPOINTMENT (INPATIENT)
Dept: RADIOLOGY | Facility: HOSPITAL | Age: 68
DRG: 870 | End: 2023-01-01
Payer: MEDICARE

## 2023-01-01 ENCOUNTER — APPOINTMENT (EMERGENCY)
Dept: CT IMAGING | Facility: HOSPITAL | Age: 68
DRG: 870 | End: 2023-01-01
Attending: EMERGENCY MEDICINE
Payer: MEDICARE

## 2023-01-01 ENCOUNTER — APPOINTMENT (INPATIENT)
Dept: NON INVASIVE DIAGNOSTICS | Facility: HOSPITAL | Age: 68
DRG: 870 | End: 2023-01-01
Payer: MEDICARE

## 2023-01-01 ENCOUNTER — HOME HEALTH ADMISSION (OUTPATIENT)
Dept: HOME HEALTH SERVICES | Facility: HOME HEALTHCARE | Age: 68
End: 2023-01-01

## 2023-01-01 ENCOUNTER — APPOINTMENT (INPATIENT)
Dept: ULTRASOUND IMAGING | Facility: HOSPITAL | Age: 68
DRG: 870 | End: 2023-01-01
Payer: MEDICARE

## 2023-01-01 ENCOUNTER — HOSPITAL ENCOUNTER (INPATIENT)
Facility: HOSPITAL | Age: 68
LOS: 28 days | DRG: 870 | End: 2023-09-08
Attending: EMERGENCY MEDICINE | Admitting: INTERNAL MEDICINE
Payer: MEDICARE

## 2023-01-01 ENCOUNTER — APPOINTMENT (INPATIENT)
Dept: CT IMAGING | Facility: HOSPITAL | Age: 68
DRG: 870 | End: 2023-01-01
Payer: MEDICARE

## 2023-01-01 ENCOUNTER — APPOINTMENT (EMERGENCY)
Dept: RADIOLOGY | Facility: HOSPITAL | Age: 68
DRG: 870 | End: 2023-01-01
Payer: MEDICARE

## 2023-01-01 ENCOUNTER — APPOINTMENT (EMERGENCY)
Dept: CT IMAGING | Facility: HOSPITAL | Age: 68
DRG: 870 | End: 2023-01-01
Payer: MEDICARE

## 2023-01-01 ENCOUNTER — APPOINTMENT (INPATIENT)
Dept: GASTROENTEROLOGY | Facility: HOSPITAL | Age: 68
DRG: 870 | End: 2023-01-01
Payer: MEDICARE

## 2023-01-01 VITALS
DIASTOLIC BLOOD PRESSURE: 11 MMHG | HEIGHT: 64 IN | OXYGEN SATURATION: 99 % | RESPIRATION RATE: 17 BRPM | TEMPERATURE: 100 F | WEIGHT: 132.5 LBS | SYSTOLIC BLOOD PRESSURE: 37 MMHG | BODY MASS INDEX: 22.62 KG/M2

## 2023-01-01 DIAGNOSIS — R65.10 SIRS (SYSTEMIC INFLAMMATORY RESPONSE SYNDROME) (HCC): ICD-10-CM

## 2023-01-01 DIAGNOSIS — J96.01 ACUTE RESPIRATORY FAILURE WITH HYPOXIA (HCC): ICD-10-CM

## 2023-01-01 DIAGNOSIS — I10 HYPERTENSION: ICD-10-CM

## 2023-01-01 DIAGNOSIS — K92.2 GI BLEED: ICD-10-CM

## 2023-01-01 DIAGNOSIS — R13.10 DYSPHAGIA: ICD-10-CM

## 2023-01-01 DIAGNOSIS — R06.03 RESPIRATORY DISTRESS: ICD-10-CM

## 2023-01-01 DIAGNOSIS — E83.52 HYPERCALCEMIA: ICD-10-CM

## 2023-01-01 DIAGNOSIS — I46.9 CARDIAC ARREST (HCC): ICD-10-CM

## 2023-01-01 DIAGNOSIS — Z86.73 HISTORY OF CVA (CEREBROVASCULAR ACCIDENT): ICD-10-CM

## 2023-01-01 DIAGNOSIS — E16.2 HYPOGLYCEMIA: ICD-10-CM

## 2023-01-01 DIAGNOSIS — I48.91 A-FIB (HCC): Primary | ICD-10-CM

## 2023-01-01 DIAGNOSIS — R13.10 DYSPHAGIA, UNSPECIFIED TYPE: ICD-10-CM

## 2023-01-01 DIAGNOSIS — G93.40 ENCEPHALOPATHY: ICD-10-CM

## 2023-01-01 DIAGNOSIS — R74.01 TRANSAMINITIS: ICD-10-CM

## 2023-01-01 DIAGNOSIS — I50.20 HFREF (HEART FAILURE WITH REDUCED EJECTION FRACTION) (HCC): ICD-10-CM

## 2023-01-01 DIAGNOSIS — K94.23 PEG TUBE MALFUNCTION (HCC): ICD-10-CM

## 2023-01-01 DIAGNOSIS — A41.9 SEPSIS (HCC): ICD-10-CM

## 2023-01-01 LAB
% PARASITEMIA: 0
% PARASITEMIA: 0
2HR DELTA HS TROPONIN: 0 NG/L
2HR DELTA HS TROPONIN: 101 NG/L
4HR DELTA HS TROPONIN: 1 NG/L
4HR DELTA HS TROPONIN: 163 NG/L
ABO GROUP BLD BPU: NORMAL
ABO GROUP BLD: NORMAL
ABO GROUP BLD: NORMAL
ALBUMIN SERPL BCP-MCNC: 1.8 G/DL (ref 3.5–5)
ALBUMIN SERPL BCP-MCNC: 1.9 G/DL (ref 3.5–5)
ALBUMIN SERPL BCP-MCNC: 1.9 G/DL (ref 3.5–5)
ALBUMIN SERPL BCP-MCNC: 2 G/DL (ref 3.5–5)
ALBUMIN SERPL BCP-MCNC: 2 G/DL (ref 3.5–5)
ALBUMIN SERPL BCP-MCNC: 2.1 G/DL (ref 3.5–5)
ALBUMIN SERPL BCP-MCNC: 2.2 G/DL (ref 3.5–5)
ALBUMIN SERPL BCP-MCNC: 2.3 G/DL (ref 3.5–5)
ALBUMIN SERPL BCP-MCNC: 2.4 G/DL (ref 3.5–5)
ALBUMIN SERPL BCP-MCNC: 2.5 G/DL (ref 3.5–5)
ALBUMIN SERPL BCP-MCNC: 2.6 G/DL (ref 3.5–5)
ALBUMIN SERPL BCP-MCNC: 2.6 G/DL (ref 3.5–5)
ALBUMIN SERPL BCP-MCNC: 2.7 G/DL (ref 3.5–5)
ALBUMIN SERPL BCP-MCNC: 2.8 G/DL (ref 3.5–5)
ALBUMIN SERPL BCP-MCNC: 3 G/DL (ref 3.5–5)
ALBUMIN SERPL BCP-MCNC: 3.1 G/DL (ref 3.5–5)
ALBUMIN SERPL BCP-MCNC: 3.1 G/DL (ref 3.5–5)
ALP SERPL-CCNC: 103 U/L (ref 34–104)
ALP SERPL-CCNC: 103 U/L (ref 34–104)
ALP SERPL-CCNC: 107 U/L (ref 34–104)
ALP SERPL-CCNC: 109 U/L (ref 34–104)
ALP SERPL-CCNC: 50 U/L (ref 34–104)
ALP SERPL-CCNC: 56 U/L (ref 34–104)
ALP SERPL-CCNC: 59 U/L (ref 34–104)
ALP SERPL-CCNC: 60 U/L (ref 34–104)
ALP SERPL-CCNC: 62 U/L (ref 34–104)
ALP SERPL-CCNC: 65 U/L (ref 34–104)
ALP SERPL-CCNC: 70 U/L (ref 34–104)
ALP SERPL-CCNC: 70 U/L (ref 34–104)
ALP SERPL-CCNC: 71 U/L (ref 34–104)
ALP SERPL-CCNC: 71 U/L (ref 34–104)
ALP SERPL-CCNC: 72 U/L (ref 34–104)
ALP SERPL-CCNC: 73 U/L (ref 34–104)
ALP SERPL-CCNC: 74 U/L (ref 34–104)
ALP SERPL-CCNC: 75 U/L (ref 34–104)
ALP SERPL-CCNC: 76 U/L (ref 34–104)
ALP SERPL-CCNC: 81 U/L (ref 34–104)
ALP SERPL-CCNC: 88 U/L (ref 34–104)
ALP SERPL-CCNC: 96 U/L (ref 34–104)
ALP SERPL-CCNC: 97 U/L (ref 34–104)
ALP SERPL-CCNC: 97 U/L (ref 34–104)
ALP SERPL-CCNC: 99 U/L (ref 34–104)
ALT SERPL W P-5'-P-CCNC: 106 U/L (ref 7–52)
ALT SERPL W P-5'-P-CCNC: 1090 U/L (ref 7–52)
ALT SERPL W P-5'-P-CCNC: 1100 U/L (ref 7–52)
ALT SERPL W P-5'-P-CCNC: 1114 U/L (ref 7–52)
ALT SERPL W P-5'-P-CCNC: 1148 U/L (ref 7–52)
ALT SERPL W P-5'-P-CCNC: 115 U/L (ref 7–52)
ALT SERPL W P-5'-P-CCNC: 1156 U/L (ref 7–52)
ALT SERPL W P-5'-P-CCNC: 128 U/L (ref 7–52)
ALT SERPL W P-5'-P-CCNC: 1376 U/L (ref 7–52)
ALT SERPL W P-5'-P-CCNC: 14 U/L (ref 7–52)
ALT SERPL W P-5'-P-CCNC: 14 U/L (ref 7–52)
ALT SERPL W P-5'-P-CCNC: 15 U/L (ref 7–52)
ALT SERPL W P-5'-P-CCNC: 16 U/L (ref 7–52)
ALT SERPL W P-5'-P-CCNC: 194 U/L (ref 7–52)
ALT SERPL W P-5'-P-CCNC: 204 U/L (ref 7–52)
ALT SERPL W P-5'-P-CCNC: 215 U/L (ref 7–52)
ALT SERPL W P-5'-P-CCNC: 37 U/L (ref 7–52)
ALT SERPL W P-5'-P-CCNC: 37 U/L (ref 7–52)
ALT SERPL W P-5'-P-CCNC: 42 U/L (ref 7–52)
ALT SERPL W P-5'-P-CCNC: 42 U/L (ref 7–52)
ALT SERPL W P-5'-P-CCNC: 465 U/L (ref 7–52)
ALT SERPL W P-5'-P-CCNC: 577 U/L (ref 7–52)
ALT SERPL W P-5'-P-CCNC: 588 U/L (ref 7–52)
ALT SERPL W P-5'-P-CCNC: 603 U/L (ref 7–52)
ALT SERPL W P-5'-P-CCNC: 705 U/L (ref 7–52)
ALT SERPL W P-5'-P-CCNC: 71 U/L (ref 7–52)
ALT SERPL W P-5'-P-CCNC: 723 U/L (ref 7–52)
ALT SERPL W P-5'-P-CCNC: 729 U/L (ref 7–52)
ALT SERPL W P-5'-P-CCNC: 752 U/L (ref 7–52)
ALT SERPL W P-5'-P-CCNC: 97 U/L (ref 7–52)
AMMONIA PLAS-SCNC: 35 UMOL/L (ref 18–72)
AMMONIA PLAS-SCNC: 47 UMOL/L (ref 18–72)
AMORPH URATE CRY URNS QL MICRO: ABNORMAL
ANA SER QL IA: NEGATIVE
ANION GAP SERPL CALCULATED.3IONS-SCNC: 10 MMOL/L
ANION GAP SERPL CALCULATED.3IONS-SCNC: 11 MMOL/L
ANION GAP SERPL CALCULATED.3IONS-SCNC: 12 MMOL/L
ANION GAP SERPL CALCULATED.3IONS-SCNC: 13 MMOL/L
ANION GAP SERPL CALCULATED.3IONS-SCNC: 13 MMOL/L
ANION GAP SERPL CALCULATED.3IONS-SCNC: 16 MMOL/L
ANION GAP SERPL CALCULATED.3IONS-SCNC: 3 MMOL/L
ANION GAP SERPL CALCULATED.3IONS-SCNC: 3 MMOL/L
ANION GAP SERPL CALCULATED.3IONS-SCNC: 31 MMOL/L
ANION GAP SERPL CALCULATED.3IONS-SCNC: 4 MMOL/L
ANION GAP SERPL CALCULATED.3IONS-SCNC: 5 MMOL/L
ANION GAP SERPL CALCULATED.3IONS-SCNC: 6 MMOL/L
ANION GAP SERPL CALCULATED.3IONS-SCNC: 8 MMOL/L
ANION GAP SERPL CALCULATED.3IONS-SCNC: 9 MMOL/L
ANISOCYTOSIS BLD QL SMEAR: PRESENT
AORTIC VALVE MEAN VELOCITY: 7.9 M/S
AORTIC VALVE MEAN VELOCITY: 8.7 M/S
APAP SERPL-MCNC: 12 UG/ML (ref 10–20)
APAP SERPL-MCNC: 15 UG/ML (ref 10–20)
APAP SERPL-MCNC: 28 UG/ML (ref 10–20)
APAP SERPL-MCNC: 34 UG/ML (ref 10–20)
APAP SERPL-MCNC: <10 UG/ML (ref 10–20)
APICAL FOUR CHAMBER EJECTION FRACTION: 19 %
APICAL FOUR CHAMBER EJECTION FRACTION: 31 %
APTT PPP: 29 SECONDS (ref 23–37)
APTT PPP: 32 SECONDS (ref 23–37)
APTT PPP: 39 SECONDS (ref 23–37)
APTT PPP: 40 SECONDS (ref 23–37)
ARTERIAL PATENCY WRIST A: NO
ARTERIAL PATENCY WRIST A: NO
ARTERIAL PATENCY WRIST A: YES
AST SERPL W P-5'-P-CCNC: 103 U/L (ref 13–39)
AST SERPL W P-5'-P-CCNC: 1604 U/L (ref 13–39)
AST SERPL W P-5'-P-CCNC: 1643 U/L (ref 13–39)
AST SERPL W P-5'-P-CCNC: 17 U/L (ref 13–39)
AST SERPL W P-5'-P-CCNC: 181 U/L (ref 13–39)
AST SERPL W P-5'-P-CCNC: 188 U/L (ref 13–39)
AST SERPL W P-5'-P-CCNC: 23 U/L (ref 13–39)
AST SERPL W P-5'-P-CCNC: 237 U/L (ref 13–39)
AST SERPL W P-5'-P-CCNC: 2700 U/L (ref 13–39)
AST SERPL W P-5'-P-CCNC: 275 U/L (ref 13–39)
AST SERPL W P-5'-P-CCNC: 2810 U/L (ref 13–39)
AST SERPL W P-5'-P-CCNC: 2818 U/L (ref 13–39)
AST SERPL W P-5'-P-CCNC: 2841 U/L (ref 13–39)
AST SERPL W P-5'-P-CCNC: 2884 U/L (ref 13–39)
AST SERPL W P-5'-P-CCNC: 304 U/L (ref 13–39)
AST SERPL W P-5'-P-CCNC: 31 U/L (ref 13–39)
AST SERPL W P-5'-P-CCNC: 34 U/L (ref 13–39)
AST SERPL W P-5'-P-CCNC: 357 U/L (ref 13–39)
AST SERPL W P-5'-P-CCNC: 358 U/L (ref 13–39)
AST SERPL W P-5'-P-CCNC: 36 U/L (ref 13–39)
AST SERPL W P-5'-P-CCNC: 38 U/L (ref 13–39)
AST SERPL W P-5'-P-CCNC: 383 U/L (ref 13–39)
AST SERPL W P-5'-P-CCNC: 46 U/L (ref 13–39)
AST SERPL W P-5'-P-CCNC: 462 U/L (ref 13–39)
AST SERPL W P-5'-P-CCNC: 56 U/L (ref 13–39)
AST SERPL W P-5'-P-CCNC: 569 U/L (ref 13–39)
AST SERPL W P-5'-P-CCNC: 739 U/L (ref 13–39)
AST SERPL W P-5'-P-CCNC: 91 U/L (ref 13–39)
ATRIAL RATE: 101 BPM
ATRIAL RATE: 111 BPM
ATRIAL RATE: 111 BPM
ATRIAL RATE: 124 BPM
ATRIAL RATE: 124 BPM
ATRIAL RATE: 137 BPM
ATRIAL RATE: 140 BPM
ATRIAL RATE: 150 BPM
ATRIAL RATE: 150 BPM
ATRIAL RATE: 154 BPM
ATRIAL RATE: 156 BPM
ATRIAL RATE: 84 BPM
ATRIAL RATE: 92 BPM
AV AREA BY CONTINUOUS VTI: 2.4 CM2
AV AREA PEAK VELOCITY: 2.7 CM2
AV LVOT MEAN GRADIENT: 1 MMHG
AV LVOT MEAN GRADIENT: 2 MMHG
AV LVOT PEAK GRADIENT: 2 MMHG
AV LVOT PEAK GRADIENT: 3 MMHG
AV MEAN GRADIENT: 3 MMHG
AV MEAN GRADIENT: 3 MMHG
AV PEAK GRADIENT: 5 MMHG
AV PEAK GRADIENT: 6 MMHG
AV REGURGITATION PRESSURE HALF TIME: 345 MS
AV VALVE AREA: 2.43 CM2
AV VELOCITY RATIO: 0.62
AV VELOCITY RATIO: 0.77
BACTERIA BLD CULT: NORMAL
BACTERIA BLD CULT: NORMAL
BACTERIA UR CULT: NORMAL
BACTERIA UR QL AUTO: ABNORMAL /HPF
BASE EX.OXY STD BLDV CALC-SCNC: 15.2 % (ref 60–80)
BASE EX.OXY STD BLDV CALC-SCNC: 19.2 % (ref 60–80)
BASE EX.OXY STD BLDV CALC-SCNC: 36.1 % (ref 60–80)
BASE EX.OXY STD BLDV CALC-SCNC: 39.3 % (ref 60–80)
BASE EX.OXY STD BLDV CALC-SCNC: 59.4 % (ref 60–80)
BASE EX.OXY STD BLDV CALC-SCNC: 69.5 % (ref 60–80)
BASE EX.OXY STD BLDV CALC-SCNC: 70.6 % (ref 60–80)
BASE EX.OXY STD BLDV CALC-SCNC: 71.1 % (ref 60–80)
BASE EX.OXY STD BLDV CALC-SCNC: 77.8 % (ref 60–80)
BASE EX.OXY STD BLDV CALC-SCNC: 78.4 % (ref 60–80)
BASE EX.OXY STD BLDV CALC-SCNC: 81.8 % (ref 60–80)
BASE EX.OXY STD BLDV CALC-SCNC: 86.2 % (ref 60–80)
BASE EXCESS BLDA CALC-SCNC: -0.4 MMOL/L
BASE EXCESS BLDA CALC-SCNC: -1.3 MMOL/L
BASE EXCESS BLDA CALC-SCNC: -1.7 MMOL/L
BASE EXCESS BLDA CALC-SCNC: -2.3 MMOL/L
BASE EXCESS BLDA CALC-SCNC: -4.4 MMOL/L
BASE EXCESS BLDA CALC-SCNC: -5 MMOL/L (ref -2–3)
BASE EXCESS BLDA CALC-SCNC: -5.5 MMOL/L
BASE EXCESS BLDA CALC-SCNC: 14 MMOL/L (ref -2–3)
BASE EXCESS BLDA CALC-SCNC: 3.1 MMOL/L
BASE EXCESS BLDA CALC-SCNC: 3.3 MMOL/L
BASE EXCESS BLDA CALC-SCNC: 3.3 MMOL/L
BASE EXCESS BLDA CALC-SCNC: 7 MMOL/L (ref -2–3)
BASE EXCESS BLDV CALC-SCNC: -0.6 MMOL/L
BASE EXCESS BLDV CALC-SCNC: -1.1 MMOL/L
BASE EXCESS BLDV CALC-SCNC: -1.6 MMOL/L
BASE EXCESS BLDV CALC-SCNC: -1.7 MMOL/L
BASE EXCESS BLDV CALC-SCNC: -1.8 MMOL/L
BASE EXCESS BLDV CALC-SCNC: -2.8 MMOL/L
BASE EXCESS BLDV CALC-SCNC: -3.9 MMOL/L
BASE EXCESS BLDV CALC-SCNC: -3.9 MMOL/L
BASE EXCESS BLDV CALC-SCNC: -5 MMOL/L
BASE EXCESS BLDV CALC-SCNC: -5.6 MMOL/L
BASE EXCESS BLDV CALC-SCNC: 4.9 MMOL/L
BASE EXCESS BLDV CALC-SCNC: 9.4 MMOL/L
BASOPHILS # BLD AUTO: 0 THOUSANDS/ÂΜL (ref 0–0.1)
BASOPHILS # BLD AUTO: 0 THOUSANDS/ÂΜL (ref 0–0.1)
BASOPHILS # BLD AUTO: 0.01 THOUSANDS/ÂΜL (ref 0–0.1)
BASOPHILS # BLD AUTO: 0.02 THOUSANDS/ÂΜL (ref 0–0.1)
BASOPHILS # BLD AUTO: 0.03 THOUSANDS/ÂΜL (ref 0–0.1)
BASOPHILS # BLD AUTO: 0.04 THOUSANDS/ÂΜL (ref 0–0.1)
BASOPHILS # BLD AUTO: 0.05 THOUSANDS/ÂΜL (ref 0–0.1)
BASOPHILS # BLD AUTO: 0.05 THOUSANDS/ÂΜL (ref 0–0.1)
BASOPHILS # BLD AUTO: 0.09 THOUSANDS/ÂΜL (ref 0–0.1)
BASOPHILS # BLD MANUAL: 0 THOUSAND/UL (ref 0–0.1)
BASOPHILS NFR BLD AUTO: 0 % (ref 0–1)
BASOPHILS NFR BLD AUTO: 1 % (ref 0–1)
BASOPHILS NFR MAR MANUAL: 0 % (ref 0–1)
BILIRUB DIRECT SERPL-MCNC: 0.25 MG/DL (ref 0–0.2)
BILIRUB DIRECT SERPL-MCNC: 0.84 MG/DL (ref 0–0.2)
BILIRUB DIRECT SERPL-MCNC: 1.23 MG/DL (ref 0–0.2)
BILIRUB DIRECT SERPL-MCNC: 1.32 MG/DL (ref 0–0.2)
BILIRUB DIRECT SERPL-MCNC: 1.63 MG/DL (ref 0–0.2)
BILIRUB SERPL-MCNC: 0.49 MG/DL (ref 0.2–1)
BILIRUB SERPL-MCNC: 0.53 MG/DL (ref 0.2–1)
BILIRUB SERPL-MCNC: 0.56 MG/DL (ref 0.2–1)
BILIRUB SERPL-MCNC: 0.57 MG/DL (ref 0.2–1)
BILIRUB SERPL-MCNC: 0.63 MG/DL (ref 0.2–1)
BILIRUB SERPL-MCNC: 0.71 MG/DL (ref 0.2–1)
BILIRUB SERPL-MCNC: 0.74 MG/DL (ref 0.2–1)
BILIRUB SERPL-MCNC: 0.86 MG/DL (ref 0.2–1)
BILIRUB SERPL-MCNC: 0.89 MG/DL (ref 0.2–1)
BILIRUB SERPL-MCNC: 1.15 MG/DL (ref 0.2–1)
BILIRUB SERPL-MCNC: 1.17 MG/DL (ref 0.2–1)
BILIRUB SERPL-MCNC: 1.22 MG/DL (ref 0.2–1)
BILIRUB SERPL-MCNC: 1.29 MG/DL (ref 0.2–1)
BILIRUB SERPL-MCNC: 1.31 MG/DL (ref 0.2–1)
BILIRUB SERPL-MCNC: 1.32 MG/DL (ref 0.2–1)
BILIRUB SERPL-MCNC: 1.35 MG/DL (ref 0.2–1)
BILIRUB SERPL-MCNC: 1.37 MG/DL (ref 0.2–1)
BILIRUB SERPL-MCNC: 1.43 MG/DL (ref 0.2–1)
BILIRUB SERPL-MCNC: 1.44 MG/DL (ref 0.2–1)
BILIRUB SERPL-MCNC: 1.45 MG/DL (ref 0.2–1)
BILIRUB SERPL-MCNC: 1.62 MG/DL (ref 0.2–1)
BILIRUB SERPL-MCNC: 1.86 MG/DL (ref 0.2–1)
BILIRUB SERPL-MCNC: 2.4 MG/DL (ref 0.2–1)
BILIRUB SERPL-MCNC: 2.63 MG/DL (ref 0.2–1)
BILIRUB SERPL-MCNC: 2.86 MG/DL (ref 0.2–1)
BILIRUB SERPL-MCNC: 2.87 MG/DL (ref 0.2–1)
BILIRUB SERPL-MCNC: 2.87 MG/DL (ref 0.2–1)
BILIRUB SERPL-MCNC: 3.31 MG/DL (ref 0.2–1)
BILIRUB SERPL-MCNC: 3.6 MG/DL (ref 0.2–1)
BILIRUB SERPL-MCNC: 3.77 MG/DL (ref 0.2–1)
BILIRUB UR QL STRIP: NEGATIVE
BLD GP AB SCN SERPL QL: NEGATIVE
BLD GP AB SCN SERPL QL: NEGATIVE
BLD SMEAR INTERP: NORMAL
BLD SMEAR INTERP: NORMAL
BNP SERPL-MCNC: >4700 PG/ML (ref 0–100)
BODY TEMPERATURE: 97.5 DEGREES FEHRENHEIT
BODY TEMPERATURE: 98.8 DEGREES FEHRENHEIT
BPU ID: NORMAL
BUDDING YEAST: PRESENT
BUN SERPL-MCNC: 12 MG/DL (ref 5–25)
BUN SERPL-MCNC: 13 MG/DL (ref 5–25)
BUN SERPL-MCNC: 14 MG/DL (ref 5–25)
BUN SERPL-MCNC: 14 MG/DL (ref 5–25)
BUN SERPL-MCNC: 17 MG/DL (ref 5–25)
BUN SERPL-MCNC: 19 MG/DL (ref 5–25)
BUN SERPL-MCNC: 20 MG/DL (ref 5–25)
BUN SERPL-MCNC: 21 MG/DL (ref 5–25)
BUN SERPL-MCNC: 21 MG/DL (ref 5–25)
BUN SERPL-MCNC: 23 MG/DL (ref 5–25)
BUN SERPL-MCNC: 24 MG/DL (ref 5–25)
BUN SERPL-MCNC: 25 MG/DL (ref 5–25)
BUN SERPL-MCNC: 27 MG/DL (ref 5–25)
BUN SERPL-MCNC: 29 MG/DL (ref 5–25)
BUN SERPL-MCNC: 30 MG/DL (ref 5–25)
BUN SERPL-MCNC: 41 MG/DL (ref 5–25)
BUN SERPL-MCNC: 43 MG/DL (ref 5–25)
BUN SERPL-MCNC: 46 MG/DL (ref 5–25)
BUN SERPL-MCNC: 46 MG/DL (ref 5–25)
BUN SERPL-MCNC: 48 MG/DL (ref 5–25)
BUN SERPL-MCNC: 49 MG/DL (ref 5–25)
BUN SERPL-MCNC: 50 MG/DL (ref 5–25)
BUN SERPL-MCNC: 51 MG/DL (ref 5–25)
BUN SERPL-MCNC: 53 MG/DL (ref 5–25)
BUN SERPL-MCNC: 55 MG/DL (ref 5–25)
BUN SERPL-MCNC: 64 MG/DL (ref 5–25)
BUN SERPL-MCNC: 73 MG/DL (ref 5–25)
BUN SERPL-MCNC: 78 MG/DL (ref 5–25)
BUN SERPL-MCNC: 80 MG/DL (ref 5–25)
C PEPTIDE SERPL-MCNC: 1.8 NG/ML (ref 1.1–4.4)
C PEPTIDE SERPL-MCNC: 5.9 NG/ML (ref 1.1–4.4)
C-ANCA TITR SER IF: NORMAL TITER
C3 SERPL-MCNC: 80 MG/DL (ref 87–200)
C4 SERPL-MCNC: 12 MG/DL (ref 19–52)
CA-I BLD-SCNC: 1.13 MMOL/L (ref 1.12–1.32)
CA-I BLD-SCNC: 1.15 MMOL/L (ref 1.12–1.32)
CA-I BLD-SCNC: 1.2 MMOL/L (ref 1.12–1.32)
CA-I BLD-SCNC: 1.21 MMOL/L (ref 1.12–1.32)
CA-I BLD-SCNC: 1.28 MMOL/L (ref 1.12–1.32)
CA-I BLD-SCNC: 1.29 MMOL/L (ref 1.12–1.32)
CALCIUM ALBUM COR SERPL-MCNC: 10.3 MG/DL (ref 8.3–10.1)
CALCIUM ALBUM COR SERPL-MCNC: 10.3 MG/DL (ref 8.3–10.1)
CALCIUM ALBUM COR SERPL-MCNC: 10.4 MG/DL (ref 8.3–10.1)
CALCIUM ALBUM COR SERPL-MCNC: 10.5 MG/DL (ref 8.3–10.1)
CALCIUM ALBUM COR SERPL-MCNC: 10.6 MG/DL (ref 8.3–10.1)
CALCIUM ALBUM COR SERPL-MCNC: 10.7 MG/DL (ref 8.3–10.1)
CALCIUM ALBUM COR SERPL-MCNC: 11.4 MG/DL (ref 8.3–10.1)
CALCIUM ALBUM COR SERPL-MCNC: 8.9 MG/DL (ref 8.3–10.1)
CALCIUM ALBUM COR SERPL-MCNC: 9.1 MG/DL (ref 8.3–10.1)
CALCIUM ALBUM COR SERPL-MCNC: 9.3 MG/DL (ref 8.3–10.1)
CALCIUM ALBUM COR SERPL-MCNC: 9.5 MG/DL (ref 8.3–10.1)
CALCIUM ALBUM COR SERPL-MCNC: 9.5 MG/DL (ref 8.3–10.1)
CALCIUM ALBUM COR SERPL-MCNC: 9.7 MG/DL (ref 8.3–10.1)
CALCIUM ALBUM COR SERPL-MCNC: 9.8 MG/DL (ref 8.3–10.1)
CALCIUM ALBUM COR SERPL-MCNC: 9.9 MG/DL (ref 8.3–10.1)
CALCIUM SERPL-MCNC: 10 MG/DL (ref 8.4–10.2)
CALCIUM SERPL-MCNC: 10.2 MG/DL (ref 8.4–10.2)
CALCIUM SERPL-MCNC: 6.8 MG/DL (ref 8.4–10.2)
CALCIUM SERPL-MCNC: 7.5 MG/DL (ref 8.4–10.2)
CALCIUM SERPL-MCNC: 7.8 MG/DL (ref 8.4–10.2)
CALCIUM SERPL-MCNC: 7.9 MG/DL (ref 8.4–10.2)
CALCIUM SERPL-MCNC: 8 MG/DL (ref 8.4–10.2)
CALCIUM SERPL-MCNC: 8 MG/DL (ref 8.4–10.2)
CALCIUM SERPL-MCNC: 8.1 MG/DL (ref 8.4–10.2)
CALCIUM SERPL-MCNC: 8.2 MG/DL (ref 8.4–10.2)
CALCIUM SERPL-MCNC: 8.3 MG/DL (ref 8.4–10.2)
CALCIUM SERPL-MCNC: 8.4 MG/DL (ref 8.4–10.2)
CALCIUM SERPL-MCNC: 8.5 MG/DL (ref 8.4–10.2)
CALCIUM SERPL-MCNC: 8.7 MG/DL (ref 8.4–10.2)
CALCIUM SERPL-MCNC: 8.9 MG/DL (ref 8.4–10.2)
CALCIUM SERPL-MCNC: 9 MG/DL (ref 8.4–10.2)
CALCIUM SERPL-MCNC: 9.1 MG/DL (ref 8.4–10.2)
CALCIUM SERPL-MCNC: 9.2 MG/DL (ref 8.4–10.2)
CALCIUM SERPL-MCNC: 9.3 MG/DL (ref 8.4–10.2)
CALCIUM SERPL-MCNC: 9.4 MG/DL (ref 8.4–10.2)
CALCIUM SERPL-MCNC: 9.6 MG/DL (ref 8.4–10.2)
CALCIUM SERPL-MCNC: 9.7 MG/DL (ref 8.4–10.2)
CALCIUM SERPL-MCNC: 9.8 MG/DL (ref 8.4–10.2)
CALCIUM SERPL-MCNC: 9.9 MG/DL (ref 8.4–10.2)
CALCIUM SERPL-MCNC: 9.9 MG/DL (ref 8.4–10.2)
CARDIAC TROPONIN I PNL SERPL HS: 13 NG/L
CARDIAC TROPONIN I PNL SERPL HS: 13 NG/L
CARDIAC TROPONIN I PNL SERPL HS: 14 NG/L
CARDIAC TROPONIN I PNL SERPL HS: 179 NG/L
CARDIAC TROPONIN I PNL SERPL HS: 280 NG/L
CARDIAC TROPONIN I PNL SERPL HS: 342 NG/L
CHLORIDE SERPL-SCNC: 101 MMOL/L (ref 96–108)
CHLORIDE SERPL-SCNC: 102 MMOL/L (ref 96–108)
CHLORIDE SERPL-SCNC: 103 MMOL/L (ref 96–108)
CHLORIDE SERPL-SCNC: 104 MMOL/L (ref 96–108)
CHLORIDE SERPL-SCNC: 105 MMOL/L (ref 96–108)
CHLORIDE SERPL-SCNC: 107 MMOL/L (ref 96–108)
CHLORIDE SERPL-SCNC: 108 MMOL/L (ref 96–108)
CHLORIDE SERPL-SCNC: 108 MMOL/L (ref 96–108)
CHLORIDE SERPL-SCNC: 109 MMOL/L (ref 96–108)
CHLORIDE SERPL-SCNC: 110 MMOL/L (ref 96–108)
CHLORIDE SERPL-SCNC: 111 MMOL/L (ref 96–108)
CHLORIDE SERPL-SCNC: 112 MMOL/L (ref 96–108)
CHLORIDE SERPL-SCNC: 113 MMOL/L (ref 96–108)
CHLORIDE SERPL-SCNC: 114 MMOL/L (ref 96–108)
CHLORIDE SERPL-SCNC: 114 MMOL/L (ref 96–108)
CHLORIDE SERPL-SCNC: 115 MMOL/L (ref 96–108)
CHLORIDE SERPL-SCNC: 116 MMOL/L (ref 96–108)
CHLORIDE SERPL-SCNC: 120 MMOL/L (ref 96–108)
CK SERPL-CCNC: 41 U/L (ref 26–192)
CLARITY UR: ABNORMAL
CLARITY UR: ABNORMAL
CLARITY UR: CLEAR
CO2 SERPL-SCNC: 15 MMOL/L (ref 21–32)
CO2 SERPL-SCNC: 19 MMOL/L (ref 21–32)
CO2 SERPL-SCNC: 21 MMOL/L (ref 21–32)
CO2 SERPL-SCNC: 22 MMOL/L (ref 21–32)
CO2 SERPL-SCNC: 23 MMOL/L (ref 21–32)
CO2 SERPL-SCNC: 24 MMOL/L (ref 21–32)
CO2 SERPL-SCNC: 25 MMOL/L (ref 21–32)
CO2 SERPL-SCNC: 25 MMOL/L (ref 21–32)
CO2 SERPL-SCNC: 26 MMOL/L (ref 21–32)
CO2 SERPL-SCNC: 27 MMOL/L (ref 21–32)
CO2 SERPL-SCNC: 28 MMOL/L (ref 21–32)
CO2 SERPL-SCNC: 29 MMOL/L (ref 21–32)
CO2 SERPL-SCNC: 30 MMOL/L (ref 21–32)
CO2 SERPL-SCNC: 30 MMOL/L (ref 21–32)
CO2 SERPL-SCNC: 31 MMOL/L (ref 21–32)
CO2 SERPL-SCNC: 32 MMOL/L (ref 21–32)
CO2 SERPL-SCNC: 33 MMOL/L (ref 21–32)
CO2 SERPL-SCNC: 35 MMOL/L (ref 21–32)
CO2 SERPL-SCNC: 36 MMOL/L (ref 21–32)
CO2 SERPL-SCNC: 37 MMOL/L (ref 21–32)
CO2 SERPL-SCNC: 39 MMOL/L (ref 21–32)
COLOR UR: ABNORMAL
COLOR UR: YELLOW
COLOR UR: YELLOW
CORTIS AM PEAK SERPL-MCNC: 21.7 UG/DL (ref 6.7–22.6)
CORTIS SERPL-MCNC: 37.8 UG/DL
CREAT SERPL-MCNC: 0.6 MG/DL (ref 0.6–1.3)
CREAT SERPL-MCNC: 0.61 MG/DL (ref 0.6–1.3)
CREAT SERPL-MCNC: 0.66 MG/DL (ref 0.6–1.3)
CREAT SERPL-MCNC: 0.67 MG/DL (ref 0.6–1.3)
CREAT SERPL-MCNC: 0.68 MG/DL (ref 0.6–1.3)
CREAT SERPL-MCNC: 0.71 MG/DL (ref 0.6–1.3)
CREAT SERPL-MCNC: 0.75 MG/DL (ref 0.6–1.3)
CREAT SERPL-MCNC: 0.76 MG/DL (ref 0.6–1.3)
CREAT SERPL-MCNC: 0.77 MG/DL (ref 0.6–1.3)
CREAT SERPL-MCNC: 0.79 MG/DL (ref 0.6–1.3)
CREAT SERPL-MCNC: 0.81 MG/DL (ref 0.6–1.3)
CREAT SERPL-MCNC: 0.81 MG/DL (ref 0.6–1.3)
CREAT SERPL-MCNC: 0.82 MG/DL (ref 0.6–1.3)
CREAT SERPL-MCNC: 0.82 MG/DL (ref 0.6–1.3)
CREAT SERPL-MCNC: 0.83 MG/DL (ref 0.6–1.3)
CREAT SERPL-MCNC: 0.85 MG/DL (ref 0.6–1.3)
CREAT SERPL-MCNC: 0.87 MG/DL (ref 0.6–1.3)
CREAT SERPL-MCNC: 0.89 MG/DL (ref 0.6–1.3)
CREAT SERPL-MCNC: 0.9 MG/DL (ref 0.6–1.3)
CREAT SERPL-MCNC: 0.91 MG/DL (ref 0.6–1.3)
CREAT SERPL-MCNC: 0.94 MG/DL (ref 0.6–1.3)
CREAT SERPL-MCNC: 0.95 MG/DL (ref 0.6–1.3)
CREAT SERPL-MCNC: 0.96 MG/DL (ref 0.6–1.3)
CREAT SERPL-MCNC: 0.98 MG/DL (ref 0.6–1.3)
CREAT SERPL-MCNC: 1.04 MG/DL (ref 0.6–1.3)
CREAT SERPL-MCNC: 1.04 MG/DL (ref 0.6–1.3)
CREAT SERPL-MCNC: 1.1 MG/DL (ref 0.6–1.3)
CREAT SERPL-MCNC: 1.1 MG/DL (ref 0.6–1.3)
CREAT SERPL-MCNC: 1.13 MG/DL (ref 0.6–1.3)
CREAT SERPL-MCNC: 1.19 MG/DL (ref 0.6–1.3)
CREAT SERPL-MCNC: 1.22 MG/DL (ref 0.6–1.3)
CREAT SERPL-MCNC: 1.23 MG/DL (ref 0.6–1.3)
CREAT SERPL-MCNC: 1.26 MG/DL (ref 0.6–1.3)
CREAT SERPL-MCNC: 1.28 MG/DL (ref 0.6–1.3)
CREAT SERPL-MCNC: 1.31 MG/DL (ref 0.6–1.3)
CREAT SERPL-MCNC: 1.32 MG/DL (ref 0.6–1.3)
CREAT SERPL-MCNC: 1.33 MG/DL (ref 0.6–1.3)
CREAT SERPL-MCNC: 1.33 MG/DL (ref 0.6–1.3)
CREAT SERPL-MCNC: 1.35 MG/DL (ref 0.6–1.3)
CREAT SERPL-MCNC: 1.52 MG/DL (ref 0.6–1.3)
CREAT SERPL-MCNC: 1.58 MG/DL (ref 0.6–1.3)
CROSSMATCH: NORMAL
CRP SERPL QL: 75.3 MG/L
CRYOGLOB RF SER-ACNC: ABNORMAL [IU]/ML
D DIMER PPP FEU-MCNC: 6.77 UG/ML FEU
D DIMER PPP FEU-MCNC: 8.49 UG/ML FEU
DEPRECATED AT III PPP: 19 % OF NORMAL (ref 92–136)
DEPRECATED AT III PPP: 41 % OF NORMAL (ref 92–136)
DIGOXIN SERPL-MCNC: 1.8 NG/ML (ref 0.8–2)
DIGOXIN SERPL-MCNC: 1.9 NG/ML (ref 0.8–2)
DME PARACHUTE DELIVERY DATE REQUESTED: NORMAL
DME PARACHUTE ITEM DESCRIPTION: NORMAL
DME PARACHUTE ORDER STATUS: NORMAL
DME PARACHUTE SUPPLIER NAME: NORMAL
DME PARACHUTE SUPPLIER PHONE: NORMAL
DOP CALC AO PEAK VEL: 1.16 M/S
DOP CALC AO PEAK VEL: 1.2 M/S
DOP CALC AO VTI: 14.31 CM
DOP CALC AO VTI: 15.95 CM
DOP CALC LVOT AREA: 3.46 CM2
DOP CALC LVOT CARDIAC INDEX: 2.37 L/MIN/M2
DOP CALC LVOT CARDIAC OUTPUT: 3.9 L/MIN
DOP CALC LVOT DIAMETER: 2.1 CM
DOP CALC LVOT PEAK VEL VTI: 10.03 CM
DOP CALC LVOT PEAK VEL VTI: 8.7 CM
DOP CALC LVOT PEAK VEL: 0.72 M/S
DOP CALC LVOT PEAK VEL: 0.92 M/S
DOP CALC LVOT STROKE INDEX: 21.8 ML/M2
DOP CALC LVOT STROKE VOLUME: 34.72 CM3
DSDNA AB SER-ACNC: <1 IU/ML (ref 0–9)
EBV NA IGG SER IA-ACNC: >600 U/ML (ref 0–17.9)
EBV VCA IGG SER IA-ACNC: >600 U/ML (ref 0–17.9)
EBV VCA IGM SER IA-ACNC: <36 U/ML (ref 0–35.9)
ENA SS-A AB SER-ACNC: <0.2 AI (ref 0–0.9)
ENA SS-B AB SER-ACNC: <0.2 AI (ref 0–0.9)
EOSINOPHIL # BLD AUTO: 0 THOUSAND/ÂΜL (ref 0–0.61)
EOSINOPHIL # BLD MANUAL: 0 THOUSAND/UL (ref 0–0.4)
EOSINOPHIL NFR BLD AUTO: 0 % (ref 0–6)
EOSINOPHIL NFR BLD MANUAL: 0 % (ref 0–6)
ERYTHROCYTE [DISTWIDTH] IN BLOOD BY AUTOMATED COUNT: 17.8 % (ref 11.6–15.1)
ERYTHROCYTE [DISTWIDTH] IN BLOOD BY AUTOMATED COUNT: 17.8 % (ref 11.6–15.1)
ERYTHROCYTE [DISTWIDTH] IN BLOOD BY AUTOMATED COUNT: 18 % (ref 11.6–15.1)
ERYTHROCYTE [DISTWIDTH] IN BLOOD BY AUTOMATED COUNT: 18 % (ref 11.6–15.1)
ERYTHROCYTE [DISTWIDTH] IN BLOOD BY AUTOMATED COUNT: 18.1 % (ref 11.6–15.1)
ERYTHROCYTE [DISTWIDTH] IN BLOOD BY AUTOMATED COUNT: 18.4 % (ref 11.6–15.1)
ERYTHROCYTE [DISTWIDTH] IN BLOOD BY AUTOMATED COUNT: 18.4 % (ref 11.6–15.1)
ERYTHROCYTE [DISTWIDTH] IN BLOOD BY AUTOMATED COUNT: 18.5 % (ref 11.6–15.1)
ERYTHROCYTE [DISTWIDTH] IN BLOOD BY AUTOMATED COUNT: 18.5 % (ref 11.6–15.1)
ERYTHROCYTE [DISTWIDTH] IN BLOOD BY AUTOMATED COUNT: 18.6 % (ref 11.6–15.1)
ERYTHROCYTE [DISTWIDTH] IN BLOOD BY AUTOMATED COUNT: 18.6 % (ref 11.6–15.1)
ERYTHROCYTE [DISTWIDTH] IN BLOOD BY AUTOMATED COUNT: 18.7 % (ref 11.6–15.1)
ERYTHROCYTE [DISTWIDTH] IN BLOOD BY AUTOMATED COUNT: 18.8 % (ref 11.6–15.1)
ERYTHROCYTE [DISTWIDTH] IN BLOOD BY AUTOMATED COUNT: 18.9 % (ref 11.6–15.1)
ERYTHROCYTE [DISTWIDTH] IN BLOOD BY AUTOMATED COUNT: 18.9 % (ref 11.6–15.1)
ERYTHROCYTE [DISTWIDTH] IN BLOOD BY AUTOMATED COUNT: 19.1 % (ref 11.6–15.1)
ERYTHROCYTE [DISTWIDTH] IN BLOOD BY AUTOMATED COUNT: 19.1 % (ref 11.6–15.1)
ERYTHROCYTE [DISTWIDTH] IN BLOOD BY AUTOMATED COUNT: 19.2 % (ref 11.6–15.1)
ERYTHROCYTE [DISTWIDTH] IN BLOOD BY AUTOMATED COUNT: 19.2 % (ref 11.6–15.1)
ERYTHROCYTE [DISTWIDTH] IN BLOOD BY AUTOMATED COUNT: 19.3 % (ref 11.6–15.1)
ERYTHROCYTE [DISTWIDTH] IN BLOOD BY AUTOMATED COUNT: 19.6 % (ref 11.6–15.1)
ERYTHROCYTE [DISTWIDTH] IN BLOOD BY AUTOMATED COUNT: 19.6 % (ref 11.6–15.1)
ERYTHROCYTE [DISTWIDTH] IN BLOOD BY AUTOMATED COUNT: 19.8 % (ref 11.6–15.1)
ERYTHROCYTE [DISTWIDTH] IN BLOOD BY AUTOMATED COUNT: 19.9 % (ref 11.6–15.1)
ERYTHROCYTE [DISTWIDTH] IN BLOOD BY AUTOMATED COUNT: 20 % (ref 11.6–15.1)
ERYTHROCYTE [DISTWIDTH] IN BLOOD BY AUTOMATED COUNT: 21.3 % (ref 11.6–15.1)
ERYTHROCYTE [DISTWIDTH] IN BLOOD BY AUTOMATED COUNT: 21.5 % (ref 11.6–15.1)
FDP BLD QL AGGL: >10 <20
FDP BLD QL AGGL: >20 <40
FERRITIN SERPL-MCNC: 123 NG/ML (ref 11–307)
FERRITIN SERPL-MCNC: 285 NG/ML (ref 11–307)
FIBRINOGEN PPP-MCNC: 169 MG/DL (ref 207–520)
FIBRINOGEN PPP-MCNC: 197 MG/DL (ref 207–520)
FIO2 GAS DIL.REBREATH: 90 L
FOLATE SERPL-MCNC: 7.6 NG/ML
FRACTIONAL SHORTENING: 15 % (ref 28–44)
GFR SERPL CREATININE-BSD FRML MDRD: 33 ML/MIN/1.73SQ M
GFR SERPL CREATININE-BSD FRML MDRD: 34 ML/MIN/1.73SQ M
GFR SERPL CREATININE-BSD FRML MDRD: 40 ML/MIN/1.73SQ M
GFR SERPL CREATININE-BSD FRML MDRD: 41 ML/MIN/1.73SQ M
GFR SERPL CREATININE-BSD FRML MDRD: 41 ML/MIN/1.73SQ M
GFR SERPL CREATININE-BSD FRML MDRD: 42 ML/MIN/1.73SQ M
GFR SERPL CREATININE-BSD FRML MDRD: 43 ML/MIN/1.73SQ M
GFR SERPL CREATININE-BSD FRML MDRD: 44 ML/MIN/1.73SQ M
GFR SERPL CREATININE-BSD FRML MDRD: 45 ML/MIN/1.73SQ M
GFR SERPL CREATININE-BSD FRML MDRD: 46 ML/MIN/1.73SQ M
GFR SERPL CREATININE-BSD FRML MDRD: 49 ML/MIN/1.73SQ M
GFR SERPL CREATININE-BSD FRML MDRD: 51 ML/MIN/1.73SQ M
GFR SERPL CREATININE-BSD FRML MDRD: 51 ML/MIN/1.73SQ M
GFR SERPL CREATININE-BSD FRML MDRD: 54 ML/MIN/1.73SQ M
GFR SERPL CREATININE-BSD FRML MDRD: 54 ML/MIN/1.73SQ M
GFR SERPL CREATININE-BSD FRML MDRD: 59 ML/MIN/1.73SQ M
GFR SERPL CREATININE-BSD FRML MDRD: 60 ML/MIN/1.73SQ M
GFR SERPL CREATININE-BSD FRML MDRD: 61 ML/MIN/1.73SQ M
GFR SERPL CREATININE-BSD FRML MDRD: 62 ML/MIN/1.73SQ M
GFR SERPL CREATININE-BSD FRML MDRD: 64 ML/MIN/1.73SQ M
GFR SERPL CREATININE-BSD FRML MDRD: 65 ML/MIN/1.73SQ M
GFR SERPL CREATININE-BSD FRML MDRD: 66 ML/MIN/1.73SQ M
GFR SERPL CREATININE-BSD FRML MDRD: 68 ML/MIN/1.73SQ M
GFR SERPL CREATININE-BSD FRML MDRD: 70 ML/MIN/1.73SQ M
GFR SERPL CREATININE-BSD FRML MDRD: 72 ML/MIN/1.73SQ M
GFR SERPL CREATININE-BSD FRML MDRD: 73 ML/MIN/1.73SQ M
GFR SERPL CREATININE-BSD FRML MDRD: 73 ML/MIN/1.73SQ M
GFR SERPL CREATININE-BSD FRML MDRD: 74 ML/MIN/1.73SQ M
GFR SERPL CREATININE-BSD FRML MDRD: 74 ML/MIN/1.73SQ M
GFR SERPL CREATININE-BSD FRML MDRD: 76 ML/MIN/1.73SQ M
GFR SERPL CREATININE-BSD FRML MDRD: 79 ML/MIN/1.73SQ M
GFR SERPL CREATININE-BSD FRML MDRD: 80 ML/MIN/1.73SQ M
GFR SERPL CREATININE-BSD FRML MDRD: 81 ML/MIN/1.73SQ M
GFR SERPL CREATININE-BSD FRML MDRD: 87 ML/MIN/1.73SQ M
GFR SERPL CREATININE-BSD FRML MDRD: 89 ML/MIN/1.73SQ M
GFR SERPL CREATININE-BSD FRML MDRD: 89 ML/MIN/1.73SQ M
GFR SERPL CREATININE-BSD FRML MDRD: 90 ML/MIN/1.73SQ M
GFR SERPL CREATININE-BSD FRML MDRD: 92 ML/MIN/1.73SQ M
GFR SERPL CREATININE-BSD FRML MDRD: 93 ML/MIN/1.73SQ M
GGT SERPL-CCNC: 27 U/L (ref 9–64)
GH SERPL-MCNC: 3.4 NG/ML (ref 0–10)
GLUCOSE SERPL-MCNC: 100 MG/DL (ref 65–140)
GLUCOSE SERPL-MCNC: 101 MG/DL (ref 65–140)
GLUCOSE SERPL-MCNC: 102 MG/DL (ref 65–140)
GLUCOSE SERPL-MCNC: 103 MG/DL (ref 65–140)
GLUCOSE SERPL-MCNC: 104 MG/DL (ref 65–140)
GLUCOSE SERPL-MCNC: 104 MG/DL (ref 65–140)
GLUCOSE SERPL-MCNC: 105 MG/DL (ref 65–140)
GLUCOSE SERPL-MCNC: 106 MG/DL (ref 65–140)
GLUCOSE SERPL-MCNC: 107 MG/DL (ref 65–140)
GLUCOSE SERPL-MCNC: 108 MG/DL (ref 65–140)
GLUCOSE SERPL-MCNC: 108 MG/DL (ref 65–140)
GLUCOSE SERPL-MCNC: 109 MG/DL (ref 65–140)
GLUCOSE SERPL-MCNC: 110 MG/DL (ref 65–140)
GLUCOSE SERPL-MCNC: 110 MG/DL (ref 65–140)
GLUCOSE SERPL-MCNC: 111 MG/DL (ref 65–140)
GLUCOSE SERPL-MCNC: 111 MG/DL (ref 65–140)
GLUCOSE SERPL-MCNC: 112 MG/DL (ref 65–140)
GLUCOSE SERPL-MCNC: 113 MG/DL (ref 65–140)
GLUCOSE SERPL-MCNC: 114 MG/DL (ref 65–140)
GLUCOSE SERPL-MCNC: 115 MG/DL (ref 65–140)
GLUCOSE SERPL-MCNC: 116 MG/DL (ref 65–140)
GLUCOSE SERPL-MCNC: 117 MG/DL (ref 65–140)
GLUCOSE SERPL-MCNC: 118 MG/DL (ref 65–140)
GLUCOSE SERPL-MCNC: 118 MG/DL (ref 65–140)
GLUCOSE SERPL-MCNC: 119 MG/DL (ref 65–140)
GLUCOSE SERPL-MCNC: 120 MG/DL (ref 65–140)
GLUCOSE SERPL-MCNC: 120 MG/DL (ref 65–140)
GLUCOSE SERPL-MCNC: 122 MG/DL (ref 65–140)
GLUCOSE SERPL-MCNC: 123 MG/DL (ref 65–140)
GLUCOSE SERPL-MCNC: 124 MG/DL (ref 65–140)
GLUCOSE SERPL-MCNC: 125 MG/DL (ref 65–140)
GLUCOSE SERPL-MCNC: 126 MG/DL (ref 65–140)
GLUCOSE SERPL-MCNC: 126 MG/DL (ref 65–140)
GLUCOSE SERPL-MCNC: 127 MG/DL (ref 65–140)
GLUCOSE SERPL-MCNC: 127 MG/DL (ref 65–140)
GLUCOSE SERPL-MCNC: 128 MG/DL (ref 65–140)
GLUCOSE SERPL-MCNC: 128 MG/DL (ref 65–140)
GLUCOSE SERPL-MCNC: 129 MG/DL (ref 65–140)
GLUCOSE SERPL-MCNC: 130 MG/DL (ref 65–140)
GLUCOSE SERPL-MCNC: 131 MG/DL (ref 65–140)
GLUCOSE SERPL-MCNC: 131 MG/DL (ref 65–140)
GLUCOSE SERPL-MCNC: 133 MG/DL (ref 65–140)
GLUCOSE SERPL-MCNC: 134 MG/DL (ref 65–140)
GLUCOSE SERPL-MCNC: 135 MG/DL (ref 65–140)
GLUCOSE SERPL-MCNC: 135 MG/DL (ref 65–140)
GLUCOSE SERPL-MCNC: 136 MG/DL (ref 65–140)
GLUCOSE SERPL-MCNC: 139 MG/DL (ref 65–140)
GLUCOSE SERPL-MCNC: 140 MG/DL (ref 65–140)
GLUCOSE SERPL-MCNC: 142 MG/DL (ref 65–140)
GLUCOSE SERPL-MCNC: 145 MG/DL (ref 65–140)
GLUCOSE SERPL-MCNC: 147 MG/DL (ref 65–140)
GLUCOSE SERPL-MCNC: 148 MG/DL (ref 65–140)
GLUCOSE SERPL-MCNC: 149 MG/DL (ref 65–140)
GLUCOSE SERPL-MCNC: 150 MG/DL (ref 65–140)
GLUCOSE SERPL-MCNC: 151 MG/DL (ref 65–140)
GLUCOSE SERPL-MCNC: 153 MG/DL (ref 65–140)
GLUCOSE SERPL-MCNC: 154 MG/DL (ref 65–140)
GLUCOSE SERPL-MCNC: 155 MG/DL (ref 65–140)
GLUCOSE SERPL-MCNC: 155 MG/DL (ref 65–140)
GLUCOSE SERPL-MCNC: 156 MG/DL (ref 65–140)
GLUCOSE SERPL-MCNC: 156 MG/DL (ref 65–140)
GLUCOSE SERPL-MCNC: 157 MG/DL (ref 65–140)
GLUCOSE SERPL-MCNC: 158 MG/DL (ref 65–140)
GLUCOSE SERPL-MCNC: 164 MG/DL (ref 65–140)
GLUCOSE SERPL-MCNC: 166 MG/DL (ref 65–140)
GLUCOSE SERPL-MCNC: 167 MG/DL (ref 65–140)
GLUCOSE SERPL-MCNC: 168 MG/DL (ref 65–140)
GLUCOSE SERPL-MCNC: 170 MG/DL (ref 65–140)
GLUCOSE SERPL-MCNC: 172 MG/DL (ref 65–140)
GLUCOSE SERPL-MCNC: 174 MG/DL (ref 65–140)
GLUCOSE SERPL-MCNC: 178 MG/DL (ref 65–140)
GLUCOSE SERPL-MCNC: 178 MG/DL (ref 65–140)
GLUCOSE SERPL-MCNC: 179 MG/DL (ref 65–140)
GLUCOSE SERPL-MCNC: 179 MG/DL (ref 65–140)
GLUCOSE SERPL-MCNC: 184 MG/DL (ref 65–140)
GLUCOSE SERPL-MCNC: 187 MG/DL (ref 65–140)
GLUCOSE SERPL-MCNC: 205 MG/DL (ref 65–140)
GLUCOSE SERPL-MCNC: 21 MG/DL (ref 65–140)
GLUCOSE SERPL-MCNC: 210 MG/DL (ref 65–140)
GLUCOSE SERPL-MCNC: 216 MG/DL (ref 65–140)
GLUCOSE SERPL-MCNC: 218 MG/DL (ref 65–140)
GLUCOSE SERPL-MCNC: 219 MG/DL (ref 65–140)
GLUCOSE SERPL-MCNC: 224 MG/DL (ref 65–140)
GLUCOSE SERPL-MCNC: 228 MG/DL (ref 65–140)
GLUCOSE SERPL-MCNC: 25 MG/DL (ref 65–140)
GLUCOSE SERPL-MCNC: 254 MG/DL (ref 65–140)
GLUCOSE SERPL-MCNC: 269 MG/DL (ref 65–140)
GLUCOSE SERPL-MCNC: 27 MG/DL (ref 65–140)
GLUCOSE SERPL-MCNC: 29 MG/DL (ref 65–140)
GLUCOSE SERPL-MCNC: 325 MG/DL (ref 65–140)
GLUCOSE SERPL-MCNC: 342 MG/DL (ref 65–140)
GLUCOSE SERPL-MCNC: 43 MG/DL (ref 65–140)
GLUCOSE SERPL-MCNC: 43 MG/DL (ref 65–140)
GLUCOSE SERPL-MCNC: 44 MG/DL (ref 65–140)
GLUCOSE SERPL-MCNC: 46 MG/DL (ref 65–140)
GLUCOSE SERPL-MCNC: 48 MG/DL (ref 65–140)
GLUCOSE SERPL-MCNC: 48 MG/DL (ref 65–140)
GLUCOSE SERPL-MCNC: 54 MG/DL (ref 65–140)
GLUCOSE SERPL-MCNC: 55 MG/DL (ref 65–140)
GLUCOSE SERPL-MCNC: 551 MG/DL (ref 65–140)
GLUCOSE SERPL-MCNC: 57 MG/DL (ref 65–140)
GLUCOSE SERPL-MCNC: 58 MG/DL (ref 65–140)
GLUCOSE SERPL-MCNC: 59 MG/DL (ref 65–140)
GLUCOSE SERPL-MCNC: 60 MG/DL (ref 65–140)
GLUCOSE SERPL-MCNC: 61 MG/DL (ref 65–140)
GLUCOSE SERPL-MCNC: 62 MG/DL (ref 65–140)
GLUCOSE SERPL-MCNC: 64 MG/DL (ref 65–140)
GLUCOSE SERPL-MCNC: 65 MG/DL (ref 65–140)
GLUCOSE SERPL-MCNC: 66 MG/DL (ref 65–140)
GLUCOSE SERPL-MCNC: 66 MG/DL (ref 65–140)
GLUCOSE SERPL-MCNC: 67 MG/DL (ref 65–140)
GLUCOSE SERPL-MCNC: 68 MG/DL (ref 65–140)
GLUCOSE SERPL-MCNC: 70 MG/DL (ref 65–140)
GLUCOSE SERPL-MCNC: 70 MG/DL (ref 65–140)
GLUCOSE SERPL-MCNC: 71 MG/DL (ref 65–140)
GLUCOSE SERPL-MCNC: 72 MG/DL (ref 65–140)
GLUCOSE SERPL-MCNC: 72 MG/DL (ref 65–140)
GLUCOSE SERPL-MCNC: 73 MG/DL (ref 65–140)
GLUCOSE SERPL-MCNC: 75 MG/DL (ref 65–140)
GLUCOSE SERPL-MCNC: 76 MG/DL (ref 65–140)
GLUCOSE SERPL-MCNC: 77 MG/DL (ref 65–140)
GLUCOSE SERPL-MCNC: 78 MG/DL (ref 65–140)
GLUCOSE SERPL-MCNC: 78 MG/DL (ref 65–140)
GLUCOSE SERPL-MCNC: 79 MG/DL (ref 65–140)
GLUCOSE SERPL-MCNC: 80 MG/DL (ref 65–140)
GLUCOSE SERPL-MCNC: 81 MG/DL (ref 65–140)
GLUCOSE SERPL-MCNC: 82 MG/DL (ref 65–140)
GLUCOSE SERPL-MCNC: 82 MG/DL (ref 65–140)
GLUCOSE SERPL-MCNC: 84 MG/DL (ref 65–140)
GLUCOSE SERPL-MCNC: 85 MG/DL (ref 65–140)
GLUCOSE SERPL-MCNC: 86 MG/DL (ref 65–140)
GLUCOSE SERPL-MCNC: 87 MG/DL (ref 65–140)
GLUCOSE SERPL-MCNC: 88 MG/DL (ref 65–140)
GLUCOSE SERPL-MCNC: 89 MG/DL (ref 65–140)
GLUCOSE SERPL-MCNC: 89 MG/DL (ref 65–140)
GLUCOSE SERPL-MCNC: 90 MG/DL (ref 65–140)
GLUCOSE SERPL-MCNC: 91 MG/DL (ref 65–140)
GLUCOSE SERPL-MCNC: 92 MG/DL (ref 65–140)
GLUCOSE SERPL-MCNC: 93 MG/DL (ref 65–140)
GLUCOSE SERPL-MCNC: 94 MG/DL (ref 65–140)
GLUCOSE SERPL-MCNC: 94 MG/DL (ref 65–140)
GLUCOSE SERPL-MCNC: 95 MG/DL (ref 65–140)
GLUCOSE SERPL-MCNC: 96 MG/DL (ref 65–140)
GLUCOSE SERPL-MCNC: 97 MG/DL (ref 65–140)
GLUCOSE SERPL-MCNC: 99 MG/DL (ref 65–140)
GLUCOSE SERPL-MCNC: 99 MG/DL (ref 65–140)
GLUCOSE SERPL-MCNC: <20 MG/DL (ref 65–140)
GLUCOSE SERPL-MCNC: >500 MG/DL (ref 65–140)
GLUCOSE UR STRIP-MCNC: ABNORMAL MG/DL
GLUCOSE UR STRIP-MCNC: NEGATIVE MG/DL
GLUCOSE UR STRIP-MCNC: NEGATIVE MG/DL
GRAN CASTS #/AREA URNS LPF: ABNORMAL /[LPF]
HAPTOGLOB SERPL-MCNC: 192 MG/DL (ref 37–355)
HAPTOGLOB SERPL-MCNC: 236 MG/DL (ref 37–355)
HAPTOGLOB SERPL-MCNC: 59 MG/DL (ref 37–355)
HAV IGM SER QL: ABNORMAL
HAV IGM SER QL: ABNORMAL
HBV CORE IGM SER QL: ABNORMAL
HBV CORE IGM SER QL: ABNORMAL
HBV SURFACE AG SER QL: ABNORMAL
HBV SURFACE AG SER QL: ABNORMAL
HBV SURFACE AG SER QL: NORMAL
HCO3 BLDA-SCNC: 17.6 MMOL/L (ref 22–28)
HCO3 BLDA-SCNC: 18.8 MMOL/L (ref 22–28)
HCO3 BLDA-SCNC: 19.3 MMOL/L (ref 22–28)
HCO3 BLDA-SCNC: 22.3 MMOL/L (ref 22–28)
HCO3 BLDA-SCNC: 22.4 MMOL/L (ref 22–28)
HCO3 BLDA-SCNC: 24.2 MMOL/L (ref 22–28)
HCO3 BLDA-SCNC: 25.4 MMOL/L (ref 22–28)
HCO3 BLDA-SCNC: 26.1 MMOL/L (ref 22–28)
HCO3 BLDA-SCNC: 26.5 MMOL/L (ref 22–28)
HCO3 BLDA-SCNC: 27.3 MMOL/L (ref 22–28)
HCO3 BLDA-SCNC: 29.8 MMOL/L (ref 22–28)
HCO3 BLDA-SCNC: 36.5 MMOL/L (ref 22–28)
HCO3 BLDV-SCNC: 19.5 MMOL/L (ref 24–30)
HCO3 BLDV-SCNC: 20.2 MMOL/L (ref 24–30)
HCO3 BLDV-SCNC: 20.4 MMOL/L (ref 24–30)
HCO3 BLDV-SCNC: 21.5 MMOL/L (ref 24–30)
HCO3 BLDV-SCNC: 22.5 MMOL/L (ref 24–30)
HCO3 BLDV-SCNC: 22.6 MMOL/L (ref 24–30)
HCO3 BLDV-SCNC: 22.6 MMOL/L (ref 24–30)
HCO3 BLDV-SCNC: 22.8 MMOL/L (ref 24–30)
HCO3 BLDV-SCNC: 24 MMOL/L (ref 24–30)
HCO3 BLDV-SCNC: 26 MMOL/L (ref 24–30)
HCO3 BLDV-SCNC: 29.8 MMOL/L (ref 24–30)
HCO3 BLDV-SCNC: 34.8 MMOL/L (ref 24–30)
HCT VFR BLD AUTO: 20.9 % (ref 34.8–46.1)
HCT VFR BLD AUTO: 20.9 % (ref 34.8–46.1)
HCT VFR BLD AUTO: 21.8 % (ref 34.8–46.1)
HCT VFR BLD AUTO: 23.4 % (ref 34.8–46.1)
HCT VFR BLD AUTO: 24.6 % (ref 34.8–46.1)
HCT VFR BLD AUTO: 24.9 % (ref 34.8–46.1)
HCT VFR BLD AUTO: 25 % (ref 34.8–46.1)
HCT VFR BLD AUTO: 26.8 % (ref 34.8–46.1)
HCT VFR BLD AUTO: 26.9 % (ref 34.8–46.1)
HCT VFR BLD AUTO: 27 % (ref 34.8–46.1)
HCT VFR BLD AUTO: 28.1 % (ref 34.8–46.1)
HCT VFR BLD AUTO: 28.3 % (ref 34.8–46.1)
HCT VFR BLD AUTO: 28.3 % (ref 34.8–46.1)
HCT VFR BLD AUTO: 28.6 % (ref 34.8–46.1)
HCT VFR BLD AUTO: 28.7 % (ref 34.8–46.1)
HCT VFR BLD AUTO: 29.1 % (ref 34.8–46.1)
HCT VFR BLD AUTO: 29.3 % (ref 34.8–46.1)
HCT VFR BLD AUTO: 29.4 % (ref 34.8–46.1)
HCT VFR BLD AUTO: 29.8 % (ref 34.8–46.1)
HCT VFR BLD AUTO: 29.8 % (ref 34.8–46.1)
HCT VFR BLD AUTO: 30.1 % (ref 34.8–46.1)
HCT VFR BLD AUTO: 30.3 % (ref 34.8–46.1)
HCT VFR BLD AUTO: 30.6 % (ref 34.8–46.1)
HCT VFR BLD AUTO: 30.9 % (ref 34.8–46.1)
HCT VFR BLD AUTO: 31.5 % (ref 34.8–46.1)
HCT VFR BLD AUTO: 32.2 % (ref 34.8–46.1)
HCT VFR BLD AUTO: 32.4 % (ref 34.8–46.1)
HCT VFR BLD AUTO: 32.8 % (ref 34.8–46.1)
HCT VFR BLD AUTO: 32.9 % (ref 34.8–46.1)
HCT VFR BLD AUTO: 32.9 % (ref 34.8–46.1)
HCT VFR BLD AUTO: 33 % (ref 34.8–46.1)
HCT VFR BLD AUTO: 34.6 % (ref 34.8–46.1)
HCT VFR BLD AUTO: 35.4 % (ref 34.8–46.1)
HCT VFR BLD AUTO: 35.9 % (ref 34.8–46.1)
HCT VFR BLD AUTO: 36.9 % (ref 34.8–46.1)
HCT VFR BLD AUTO: 39.2 % (ref 34.8–46.1)
HCT VFR BLD CALC: 23 % (ref 34.8–46.1)
HCT VFR BLD CALC: 29 % (ref 34.8–46.1)
HCT VFR BLD CALC: 37 % (ref 34.8–46.1)
HCV AB SER QL: REACTIVE
HCV RNA SERPL NAA+PROBE-ACNC: NOT DETECTED K[IU]/ML
HCV RNA SERPL NAA+PROBE-ACNC: NOT DETECTED K[IU]/ML
HFNC FLOW LPM: 60
HGB BLD-MCNC: 10.1 G/DL (ref 11.5–15.4)
HGB BLD-MCNC: 10.1 G/DL (ref 11.5–15.4)
HGB BLD-MCNC: 10.4 G/DL (ref 11.5–15.4)
HGB BLD-MCNC: 10.6 G/DL (ref 11.5–15.4)
HGB BLD-MCNC: 10.9 G/DL (ref 11.5–15.4)
HGB BLD-MCNC: 10.9 G/DL (ref 11.5–15.4)
HGB BLD-MCNC: 11.9 G/DL (ref 11.5–15.4)
HGB BLD-MCNC: 6.4 G/DL (ref 11.5–15.4)
HGB BLD-MCNC: 6.5 G/DL (ref 11.5–15.4)
HGB BLD-MCNC: 6.6 G/DL (ref 11.5–15.4)
HGB BLD-MCNC: 6.6 G/DL (ref 11.5–15.4)
HGB BLD-MCNC: 6.9 G/DL (ref 11.5–15.4)
HGB BLD-MCNC: 7.4 G/DL (ref 11.5–15.4)
HGB BLD-MCNC: 7.5 G/DL (ref 11.5–15.4)
HGB BLD-MCNC: 7.6 G/DL (ref 11.5–15.4)
HGB BLD-MCNC: 7.9 G/DL (ref 11.5–15.4)
HGB BLD-MCNC: 8.1 G/DL (ref 11.5–15.4)
HGB BLD-MCNC: 8.2 G/DL (ref 11.5–15.4)
HGB BLD-MCNC: 8.4 G/DL (ref 11.5–15.4)
HGB BLD-MCNC: 8.4 G/DL (ref 11.5–15.4)
HGB BLD-MCNC: 8.5 G/DL (ref 11.5–15.4)
HGB BLD-MCNC: 8.6 G/DL (ref 11.5–15.4)
HGB BLD-MCNC: 8.8 G/DL (ref 11.5–15.4)
HGB BLD-MCNC: 8.8 G/DL (ref 11.5–15.4)
HGB BLD-MCNC: 9 G/DL (ref 11.5–15.4)
HGB BLD-MCNC: 9 G/DL (ref 11.5–15.4)
HGB BLD-MCNC: 9.1 G/DL (ref 11.5–15.4)
HGB BLD-MCNC: 9.2 G/DL (ref 11.5–15.4)
HGB BLD-MCNC: 9.2 G/DL (ref 11.5–15.4)
HGB BLD-MCNC: 9.3 G/DL (ref 11.5–15.4)
HGB BLD-MCNC: 9.3 G/DL (ref 11.5–15.4)
HGB BLD-MCNC: 9.4 G/DL (ref 11.5–15.4)
HGB BLD-MCNC: 9.7 G/DL (ref 11.5–15.4)
HGB BLD-MCNC: 9.7 G/DL (ref 11.5–15.4)
HGB BLD-MCNC: 9.8 G/DL (ref 11.5–15.4)
HGB BLD-MCNC: 9.8 G/DL (ref 11.5–15.4)
HGB BLDA-MCNC: 12.6 G/DL (ref 11.5–15.4)
HGB BLDA-MCNC: 7.8 G/DL (ref 11.5–15.4)
HGB BLDA-MCNC: 9.9 G/DL (ref 11.5–15.4)
HGB UR QL STRIP.AUTO: ABNORMAL
HGB UR QL STRIP.AUTO: NEGATIVE
HGB UR QL STRIP.AUTO: NEGATIVE
HISTONE IGG SER IA-ACNC: 0.5 UNITS (ref 0–0.9)
HIV 1+2 AB+HIV1 P24 AG SERPL QL IA: NORMAL
HIV1 P24 AG SER QL: NORMAL
HOROWITZ INDEX BLDA+IHG-RTO: 100 MM[HG]
HOROWITZ INDEX BLDA+IHG-RTO: 40 MM[HG]
HOROWITZ INDEX BLDA+IHG-RTO: 50 MM[HG]
HOROWITZ INDEX BLDA+IHG-RTO: 60 MM[HG]
HOROWITZ INDEX BLDA+IHG-RTO: 60 MM[HG]
HOROWITZ INDEX BLDA+IHG-RTO: 90 MM[HG]
HOROWITZ INDEX BLDA+IHG-RTO: 90 MM[HG]
HSV1 DNA SPEC QL NAA+PROBE: NEGATIVE
HSV2 DNA SPEC QL NAA+PROBE: NEGATIVE
HYALINE CASTS #/AREA URNS LPF: ABNORMAL /LPF
IMM GRANULOCYTES # BLD AUTO: 0.04 THOUSAND/UL (ref 0–0.2)
IMM GRANULOCYTES # BLD AUTO: 0.04 THOUSAND/UL (ref 0–0.2)
IMM GRANULOCYTES # BLD AUTO: 0.05 THOUSAND/UL (ref 0–0.2)
IMM GRANULOCYTES # BLD AUTO: 0.05 THOUSAND/UL (ref 0–0.2)
IMM GRANULOCYTES # BLD AUTO: 0.06 THOUSAND/UL (ref 0–0.2)
IMM GRANULOCYTES # BLD AUTO: 0.07 THOUSAND/UL (ref 0–0.2)
IMM GRANULOCYTES # BLD AUTO: 0.07 THOUSAND/UL (ref 0–0.2)
IMM GRANULOCYTES # BLD AUTO: 0.08 THOUSAND/UL (ref 0–0.2)
IMM GRANULOCYTES # BLD AUTO: 0.09 THOUSAND/UL (ref 0–0.2)
IMM GRANULOCYTES # BLD AUTO: 0.1 THOUSAND/UL (ref 0–0.2)
IMM GRANULOCYTES # BLD AUTO: 0.11 THOUSAND/UL (ref 0–0.2)
IMM GRANULOCYTES # BLD AUTO: 0.14 THOUSAND/UL (ref 0–0.2)
IMM GRANULOCYTES # BLD AUTO: 0.16 THOUSAND/UL (ref 0–0.2)
IMM GRANULOCYTES # BLD AUTO: 0.16 THOUSAND/UL (ref 0–0.2)
IMM GRANULOCYTES # BLD AUTO: 0.33 THOUSAND/UL (ref 0–0.2)
IMM GRANULOCYTES NFR BLD AUTO: 0 % (ref 0–2)
IMM GRANULOCYTES NFR BLD AUTO: 1 % (ref 0–2)
INR PPP: 1.28 (ref 0.84–1.19)
INR PPP: 1.32 (ref 0.84–1.19)
INR PPP: 1.34 (ref 0.84–1.19)
INR PPP: 1.48 (ref 0.84–1.19)
INR PPP: 1.69 (ref 0.84–1.19)
INR PPP: 1.74 (ref 0.84–1.19)
INR PPP: 11.99 (ref 0.84–1.19)
INR PPP: 12.39 (ref 0.84–1.19)
INR PPP: 2.19 (ref 0.84–1.19)
INR PPP: 2.34 (ref 0.84–1.19)
INR PPP: 2.34 (ref 0.84–1.19)
INR PPP: 2.95 (ref 0.84–1.19)
INR PPP: 3.57 (ref 0.84–1.19)
INR PPP: 4.21 (ref 0.84–1.19)
INR PPP: 5.28 (ref 0.84–1.19)
INR PPP: 7.37 (ref 0.84–1.19)
INR PPP: 9.44 (ref 0.84–1.19)
INSULIN SERPL-ACNC: 2.35 UIU/ML
INSULIN SERPL-ACNC: 21.26 UIU/ML
INTERPRETATION: ABNORMAL
INTERVENTRICULAR SEPTUM IN DIASTOLE (PARASTERNAL SHORT AXIS VIEW): 1.1 CM
INTERVENTRICULAR SEPTUM: 1.1 CM (ref 0.6–1.1)
IRON SATN MFR SERPL: 11 % (ref 15–50)
IRON SATN MFR SERPL: 18 % (ref 15–50)
IRON SERPL-MCNC: 21 UG/DL (ref 50–212)
IRON SERPL-MCNC: 28 UG/DL (ref 50–212)
KETONES UR STRIP-MCNC: ABNORMAL MG/DL
KETONES UR STRIP-MCNC: NEGATIVE MG/DL
KETONES UR STRIP-MCNC: NEGATIVE MG/DL
L PNEUMO1 AG UR QL IA.RAPID: NEGATIVE
LAAS-AP2: 26 CM2
LAAS-AP4: 23.3 CM2
LACTATE SERPL-SCNC: 1.3 MMOL/L (ref 0.5–2)
LACTATE SERPL-SCNC: 18.9 MMOL/L (ref 0.5–2)
LACTATE SERPL-SCNC: 2 MMOL/L (ref 0.5–2)
LACTATE SERPL-SCNC: 2.7 MMOL/L (ref 0.5–2)
LACTATE SERPL-SCNC: 4.7 MMOL/L (ref 0.5–2)
LACTATE SERPL-SCNC: 4.9 MMOL/L (ref 0.5–2)
LACTATE SERPL-SCNC: 5 MMOL/L (ref 0.5–2)
LACTATE SERPL-SCNC: 5.4 MMOL/L (ref 0.5–2)
LACTATE SERPL-SCNC: 6.3 MMOL/L (ref 0.5–2)
LACTATE SERPL-SCNC: 6.5 MMOL/L (ref 0.5–2)
LACTATE SERPL-SCNC: 7.8 MMOL/L (ref 0.5–2)
LACTATE SERPL-SCNC: 8.4 MMOL/L (ref 0.5–2)
LACTATE SERPL-SCNC: 9 MMOL/L (ref 0.5–2)
LACTATE SERPL-SCNC: 9.2 MMOL/L (ref 0.5–2)
LDH SERPL-CCNC: 222 U/L (ref 140–271)
LDH SERPL-CCNC: 260 U/L (ref 140–271)
LDH SERPL-CCNC: 275 U/L (ref 140–271)
LEFT ATRIUM VOLUME (MOD BIPLANE): 85 ML
LEFT INTERNAL DIMENSION IN SYSTOLE: 4.6 CM (ref 2.1–4)
LEFT VENTRICLE DIASTOLIC VOLUME (MOD BIPLANE): 120 ML
LEFT VENTRICLE DIASTOLIC VOLUME (MOD BIPLANE): 148 ML
LEFT VENTRICLE SYSTOLIC VOLUME (MOD BIPLANE): 127 ML
LEFT VENTRICLE SYSTOLIC VOLUME (MOD BIPLANE): 78 ML
LEFT VENTRICULAR INTERNAL DIMENSION IN DIASTOLE: 5.4 CM (ref 3.5–6)
LEFT VENTRICULAR POSTERIOR WALL IN END DIASTOLE: 1 CM
LEFT VENTRICULAR STROKE VOLUME: 46 ML
LEUKOCYTE ESTERASE UR QL STRIP: ABNORMAL
LEUKOCYTE ESTERASE UR QL STRIP: ABNORMAL
LEUKOCYTE ESTERASE UR QL STRIP: NEGATIVE
LG PLATELETS BLD QL SMEAR: PRESENT
LV EF: 14 %
LV EF: 35 %
LVSV (TEICH): 46 ML
LYMPHOCYTES # BLD AUTO: 0.49 THOUSAND/UL (ref 0.6–4.47)
LYMPHOCYTES # BLD AUTO: 0.49 THOUSANDS/ÂΜL (ref 0.6–4.47)
LYMPHOCYTES # BLD AUTO: 0.57 THOUSANDS/ÂΜL (ref 0.6–4.47)
LYMPHOCYTES # BLD AUTO: 0.62 THOUSAND/UL (ref 0.6–4.47)
LYMPHOCYTES # BLD AUTO: 0.66 THOUSANDS/ÂΜL (ref 0.6–4.47)
LYMPHOCYTES # BLD AUTO: 0.71 THOUSANDS/ÂΜL (ref 0.6–4.47)
LYMPHOCYTES # BLD AUTO: 0.78 THOUSANDS/ÂΜL (ref 0.6–4.47)
LYMPHOCYTES # BLD AUTO: 0.86 THOUSAND/UL (ref 0.6–4.47)
LYMPHOCYTES # BLD AUTO: 0.88 THOUSANDS/ÂΜL (ref 0.6–4.47)
LYMPHOCYTES # BLD AUTO: 0.9 THOUSANDS/ÂΜL (ref 0.6–4.47)
LYMPHOCYTES # BLD AUTO: 0.93 THOUSANDS/ÂΜL (ref 0.6–4.47)
LYMPHOCYTES # BLD AUTO: 1.03 THOUSANDS/ÂΜL (ref 0.6–4.47)
LYMPHOCYTES # BLD AUTO: 1.09 THOUSANDS/ÂΜL (ref 0.6–4.47)
LYMPHOCYTES # BLD AUTO: 1.1 THOUSANDS/ÂΜL (ref 0.6–4.47)
LYMPHOCYTES # BLD AUTO: 1.16 THOUSANDS/ÂΜL (ref 0.6–4.47)
LYMPHOCYTES # BLD AUTO: 1.16 THOUSANDS/ÂΜL (ref 0.6–4.47)
LYMPHOCYTES # BLD AUTO: 1.25 THOUSANDS/ÂΜL (ref 0.6–4.47)
LYMPHOCYTES # BLD AUTO: 1.29 THOUSANDS/ÂΜL (ref 0.6–4.47)
LYMPHOCYTES # BLD AUTO: 1.31 THOUSANDS/ÂΜL (ref 0.6–4.47)
LYMPHOCYTES # BLD AUTO: 1.33 THOUSAND/UL (ref 0.6–4.47)
LYMPHOCYTES # BLD AUTO: 1.46 THOUSAND/UL (ref 0.6–4.47)
LYMPHOCYTES # BLD AUTO: 1.74 THOUSANDS/ÂΜL (ref 0.6–4.47)
LYMPHOCYTES # BLD AUTO: 2.97 THOUSAND/UL (ref 0.6–4.47)
LYMPHOCYTES # BLD AUTO: 35 % (ref 14–44)
LYMPHOCYTES # BLD AUTO: 4 % (ref 14–44)
LYMPHOCYTES # BLD AUTO: 5 % (ref 14–44)
LYMPHOCYTES # BLD AUTO: 5 % (ref 14–44)
LYMPHOCYTES # BLD AUTO: 7 % (ref 14–44)
LYMPHOCYTES # BLD AUTO: 9 % (ref 14–44)
LYMPHOCYTES NFR BLD AUTO: 10 % (ref 14–44)
LYMPHOCYTES NFR BLD AUTO: 10 % (ref 14–44)
LYMPHOCYTES NFR BLD AUTO: 11 % (ref 14–44)
LYMPHOCYTES NFR BLD AUTO: 12 % (ref 14–44)
LYMPHOCYTES NFR BLD AUTO: 13 % (ref 14–44)
LYMPHOCYTES NFR BLD AUTO: 13 % (ref 14–44)
LYMPHOCYTES NFR BLD AUTO: 16 % (ref 14–44)
LYMPHOCYTES NFR BLD AUTO: 17 % (ref 14–44)
LYMPHOCYTES NFR BLD AUTO: 17 % (ref 14–44)
LYMPHOCYTES NFR BLD AUTO: 4 % (ref 14–44)
LYMPHOCYTES NFR BLD AUTO: 5 % (ref 14–44)
LYMPHOCYTES NFR BLD AUTO: 7 % (ref 14–44)
LYMPHOCYTES NFR BLD AUTO: 7 % (ref 14–44)
LYMPHOCYTES NFR BLD: 4 % (ref 14–44)
LYMPHOCYTES NFR BLD: 8 % (ref 14–44)
MACROCYTES BLD QL AUTO: PRESENT
MAGNESIUM SERPL-MCNC: 1.4 MG/DL (ref 1.9–2.7)
MAGNESIUM SERPL-MCNC: 1.5 MG/DL (ref 1.9–2.7)
MAGNESIUM SERPL-MCNC: 1.6 MG/DL (ref 1.9–2.7)
MAGNESIUM SERPL-MCNC: 1.6 MG/DL (ref 1.9–2.7)
MAGNESIUM SERPL-MCNC: 1.7 MG/DL (ref 1.9–2.7)
MAGNESIUM SERPL-MCNC: 1.8 MG/DL (ref 1.9–2.7)
MAGNESIUM SERPL-MCNC: 1.9 MG/DL (ref 1.9–2.7)
MAGNESIUM SERPL-MCNC: 1.9 MG/DL (ref 1.9–2.7)
MAGNESIUM SERPL-MCNC: 2 MG/DL (ref 1.9–2.7)
MAGNESIUM SERPL-MCNC: 2.3 MG/DL (ref 1.9–2.7)
MAGNESIUM SERPL-MCNC: 2.6 MG/DL (ref 1.9–2.7)
MAGNESIUM SERPL-MCNC: 2.7 MG/DL (ref 1.9–2.7)
MAGNESIUM SERPL-MCNC: 2.7 MG/DL (ref 1.9–2.7)
MAGNESIUM SERPL-MCNC: 2.8 MG/DL (ref 1.9–2.7)
MAGNESIUM SERPL-MCNC: 3 MG/DL (ref 1.9–2.7)
MAGNESIUM SERPL-MCNC: 3.8 MG/DL (ref 1.9–2.7)
MCH RBC QN AUTO: 30.3 PG (ref 26.8–34.3)
MCH RBC QN AUTO: 30.4 PG (ref 26.8–34.3)
MCH RBC QN AUTO: 30.4 PG (ref 26.8–34.3)
MCH RBC QN AUTO: 30.5 PG (ref 26.8–34.3)
MCH RBC QN AUTO: 30.5 PG (ref 26.8–34.3)
MCH RBC QN AUTO: 30.6 PG (ref 26.8–34.3)
MCH RBC QN AUTO: 30.6 PG (ref 26.8–34.3)
MCH RBC QN AUTO: 30.7 PG (ref 26.8–34.3)
MCH RBC QN AUTO: 30.8 PG (ref 26.8–34.3)
MCH RBC QN AUTO: 30.9 PG (ref 26.8–34.3)
MCH RBC QN AUTO: 30.9 PG (ref 26.8–34.3)
MCH RBC QN AUTO: 31 PG (ref 26.8–34.3)
MCH RBC QN AUTO: 31.1 PG (ref 26.8–34.3)
MCH RBC QN AUTO: 31.1 PG (ref 26.8–34.3)
MCH RBC QN AUTO: 31.2 PG (ref 26.8–34.3)
MCH RBC QN AUTO: 31.2 PG (ref 26.8–34.3)
MCH RBC QN AUTO: 31.3 PG (ref 26.8–34.3)
MCH RBC QN AUTO: 31.4 PG (ref 26.8–34.3)
MCH RBC QN AUTO: 31.6 PG (ref 26.8–34.3)
MCH RBC QN AUTO: 31.6 PG (ref 26.8–34.3)
MCH RBC QN AUTO: 31.8 PG (ref 26.8–34.3)
MCH RBC QN AUTO: 31.9 PG (ref 26.8–34.3)
MCH RBC QN AUTO: 32 PG (ref 26.8–34.3)
MCH RBC QN AUTO: 32 PG (ref 26.8–34.3)
MCH RBC QN AUTO: 32.1 PG (ref 26.8–34.3)
MCH RBC QN AUTO: 32.1 PG (ref 26.8–34.3)
MCH RBC QN AUTO: 32.3 PG (ref 26.8–34.3)
MCH RBC QN AUTO: 32.4 PG (ref 26.8–34.3)
MCH RBC QN AUTO: 32.5 PG (ref 26.8–34.3)
MCHC RBC AUTO-ENTMCNC: 28.6 G/DL (ref 31.4–37.4)
MCHC RBC AUTO-ENTMCNC: 29.2 G/DL (ref 31.4–37.4)
MCHC RBC AUTO-ENTMCNC: 29.3 G/DL (ref 31.4–37.4)
MCHC RBC AUTO-ENTMCNC: 29.4 G/DL (ref 31.4–37.4)
MCHC RBC AUTO-ENTMCNC: 29.5 G/DL (ref 31.4–37.4)
MCHC RBC AUTO-ENTMCNC: 29.6 G/DL (ref 31.4–37.4)
MCHC RBC AUTO-ENTMCNC: 29.8 G/DL (ref 31.4–37.4)
MCHC RBC AUTO-ENTMCNC: 29.8 G/DL (ref 31.4–37.4)
MCHC RBC AUTO-ENTMCNC: 29.9 G/DL (ref 31.4–37.4)
MCHC RBC AUTO-ENTMCNC: 30 G/DL (ref 31.4–37.4)
MCHC RBC AUTO-ENTMCNC: 30.1 G/DL (ref 31.4–37.4)
MCHC RBC AUTO-ENTMCNC: 30.2 G/DL (ref 31.4–37.4)
MCHC RBC AUTO-ENTMCNC: 30.4 G/DL (ref 31.4–37.4)
MCHC RBC AUTO-ENTMCNC: 30.6 G/DL (ref 31.4–37.4)
MCHC RBC AUTO-ENTMCNC: 30.6 G/DL (ref 31.4–37.4)
MCHC RBC AUTO-ENTMCNC: 30.7 G/DL (ref 31.4–37.4)
MCHC RBC AUTO-ENTMCNC: 30.7 G/DL (ref 31.4–37.4)
MCHC RBC AUTO-ENTMCNC: 30.9 G/DL (ref 31.4–37.4)
MCHC RBC AUTO-ENTMCNC: 31 G/DL (ref 31.4–37.4)
MCHC RBC AUTO-ENTMCNC: 31.1 G/DL (ref 31.4–37.4)
MCHC RBC AUTO-ENTMCNC: 31.2 G/DL (ref 31.4–37.4)
MCHC RBC AUTO-ENTMCNC: 31.3 G/DL (ref 31.4–37.4)
MCHC RBC AUTO-ENTMCNC: 31.6 G/DL (ref 31.4–37.4)
MCHC RBC AUTO-ENTMCNC: 31.8 G/DL (ref 31.4–37.4)
MCV RBC AUTO: 101 FL (ref 82–98)
MCV RBC AUTO: 102 FL (ref 82–98)
MCV RBC AUTO: 103 FL (ref 82–98)
MCV RBC AUTO: 104 FL (ref 82–98)
MCV RBC AUTO: 105 FL (ref 82–98)
MCV RBC AUTO: 106 FL (ref 82–98)
MCV RBC AUTO: 107 FL (ref 82–98)
MCV RBC AUTO: 110 FL (ref 82–98)
MCV RBC AUTO: 97 FL (ref 82–98)
MCV RBC AUTO: 98 FL (ref 82–98)
MCV RBC AUTO: 98 FL (ref 82–98)
MCV RBC AUTO: 99 FL (ref 82–98)
METAMYELOCYTES NFR BLD MANUAL: 1 % (ref 0–1)
METAMYELOCYTES NFR BLD MANUAL: 2 % (ref 0–1)
MONOCYTES # BLD AUTO: 0.29 THOUSAND/UL (ref 0–1.22)
MONOCYTES # BLD AUTO: 0.47 THOUSAND/ÂΜL (ref 0.17–1.22)
MONOCYTES # BLD AUTO: 0.49 THOUSAND/UL (ref 0–1.22)
MONOCYTES # BLD AUTO: 0.55 THOUSAND/ÂΜL (ref 0.17–1.22)
MONOCYTES # BLD AUTO: 0.58 THOUSAND/ÂΜL (ref 0.17–1.22)
MONOCYTES # BLD AUTO: 0.59 THOUSAND/UL (ref 0–1.22)
MONOCYTES # BLD AUTO: 0.6 THOUSAND/ÂΜL (ref 0.17–1.22)
MONOCYTES # BLD AUTO: 0.74 THOUSAND/UL (ref 0–1.22)
MONOCYTES # BLD AUTO: 0.8 THOUSAND/ÂΜL (ref 0.17–1.22)
MONOCYTES # BLD AUTO: 0.83 THOUSAND/ÂΜL (ref 0.17–1.22)
MONOCYTES # BLD AUTO: 0.87 THOUSAND/ÂΜL (ref 0.17–1.22)
MONOCYTES # BLD AUTO: 0.93 THOUSAND/UL (ref 0–1.22)
MONOCYTES # BLD AUTO: 0.93 THOUSAND/ÂΜL (ref 0.17–1.22)
MONOCYTES # BLD AUTO: 0.93 THOUSAND/ÂΜL (ref 0.17–1.22)
MONOCYTES # BLD AUTO: 0.94 THOUSAND/ÂΜL (ref 0.17–1.22)
MONOCYTES # BLD AUTO: 0.95 THOUSAND/ÂΜL (ref 0.17–1.22)
MONOCYTES # BLD AUTO: 0.99 THOUSAND/ÂΜL (ref 0.17–1.22)
MONOCYTES # BLD AUTO: 1.07 THOUSAND/ÂΜL (ref 0.17–1.22)
MONOCYTES # BLD AUTO: 1.11 THOUSAND/UL (ref 0–1.22)
MONOCYTES # BLD AUTO: 1.11 THOUSAND/ÂΜL (ref 0.17–1.22)
MONOCYTES # BLD AUTO: 1.16 THOUSAND/ÂΜL (ref 0.17–1.22)
MONOCYTES # BLD AUTO: 1.17 THOUSAND/ÂΜL (ref 0.17–1.22)
MONOCYTES # BLD AUTO: 1.49 THOUSAND/ÂΜL (ref 0.17–1.22)
MONOCYTES NFR BLD AUTO: 10 % (ref 4–12)
MONOCYTES NFR BLD AUTO: 10 % (ref 4–12)
MONOCYTES NFR BLD AUTO: 11 % (ref 4–12)
MONOCYTES NFR BLD AUTO: 13 % (ref 4–12)
MONOCYTES NFR BLD AUTO: 15 % (ref 4–12)
MONOCYTES NFR BLD AUTO: 4 % (ref 4–12)
MONOCYTES NFR BLD AUTO: 4 % (ref 4–12)
MONOCYTES NFR BLD AUTO: 5 % (ref 4–12)
MONOCYTES NFR BLD AUTO: 6 % (ref 4–12)
MONOCYTES NFR BLD AUTO: 7 % (ref 4–12)
MONOCYTES NFR BLD AUTO: 9 % (ref 4–12)
MONOCYTES NFR BLD: 1 % (ref 4–12)
MONOCYTES NFR BLD: 5 % (ref 4–12)
MONOCYTES NFR BLD: 5 % (ref 4–12)
MONOCYTES NFR BLD: 6 % (ref 4–12)
MONOCYTES NFR BLD: 7 % (ref 4–12)
MONOCYTES NFR BLD: 9 % (ref 4–12)
MUCOUS THREADS UR QL AUTO: ABNORMAL
MUCOUS THREADS UR QL AUTO: ABNORMAL
MV E'TISSUE VEL-LAT: 8 CM/S
MYELOCYTES NFR BLD MANUAL: 1 % (ref 0–1)
MYELOCYTES NFR BLD: 1 % (ref 0–1)
MYELOPEROXIDASE AB SER IA-ACNC: <0.2 UNITS (ref 0–0.9)
MYELOPEROXIDASE AB SER IA-ACNC: <0.2 UNITS (ref 0–0.9)
NASAL CANNULA: 6
NASAL CANNULA: 6
NEUTROPHILS # BLD AUTO: 11.39 THOUSANDS/ÂΜL (ref 1.85–7.62)
NEUTROPHILS # BLD AUTO: 12.36 THOUSANDS/ÂΜL (ref 1.85–7.62)
NEUTROPHILS # BLD AUTO: 14.23 THOUSANDS/ÂΜL (ref 1.85–7.62)
NEUTROPHILS # BLD AUTO: 14.9 THOUSANDS/ÂΜL (ref 1.85–7.62)
NEUTROPHILS # BLD AUTO: 14.92 THOUSANDS/ÂΜL (ref 1.85–7.62)
NEUTROPHILS # BLD AUTO: 15.63 THOUSANDS/ÂΜL (ref 1.85–7.62)
NEUTROPHILS # BLD AUTO: 23.49 THOUSANDS/ÂΜL (ref 1.85–7.62)
NEUTROPHILS # BLD AUTO: 5.17 THOUSANDS/ÂΜL (ref 1.85–7.62)
NEUTROPHILS # BLD AUTO: 5.47 THOUSANDS/ÂΜL (ref 1.85–7.62)
NEUTROPHILS # BLD AUTO: 6.45 THOUSANDS/ÂΜL (ref 1.85–7.62)
NEUTROPHILS # BLD AUTO: 6.95 THOUSANDS/ÂΜL (ref 1.85–7.62)
NEUTROPHILS # BLD AUTO: 6.96 THOUSANDS/ÂΜL (ref 1.85–7.62)
NEUTROPHILS # BLD AUTO: 7.31 THOUSANDS/ÂΜL (ref 1.85–7.62)
NEUTROPHILS # BLD AUTO: 7.51 THOUSANDS/ÂΜL (ref 1.85–7.62)
NEUTROPHILS # BLD AUTO: 7.7 THOUSANDS/ÂΜL (ref 1.85–7.62)
NEUTROPHILS # BLD AUTO: 8.01 THOUSANDS/ÂΜL (ref 1.85–7.62)
NEUTROPHILS # BLD AUTO: 8.52 THOUSANDS/ÂΜL (ref 1.85–7.62)
NEUTROPHILS # BLD MANUAL: 10.19 THOUSAND/UL (ref 1.85–7.62)
NEUTROPHILS # BLD MANUAL: 12.67 THOUSAND/UL (ref 1.85–7.62)
NEUTROPHILS # BLD MANUAL: 13.94 THOUSAND/UL (ref 1.85–7.62)
NEUTROPHILS # BLD MANUAL: 26.87 THOUSAND/UL (ref 1.85–7.62)
NEUTROPHILS # BLD MANUAL: 4.83 THOUSAND/UL (ref 1.85–7.62)
NEUTROPHILS # BLD MANUAL: 8.89 THOUSAND/UL (ref 1.85–7.62)
NEUTS BAND NFR BLD MANUAL: 1 % (ref 0–8)
NEUTS BAND NFR BLD MANUAL: 2 THOUSAND/UL
NEUTS BAND NFR BLD MANUAL: 3 % (ref 0–8)
NEUTS BAND NFR BLD MANUAL: 5 THOUSAND/UL
NEUTS SEG NFR BLD AUTO: 56 % (ref 43–75)
NEUTS SEG NFR BLD AUTO: 67 % (ref 43–75)
NEUTS SEG NFR BLD AUTO: 72 % (ref 43–75)
NEUTS SEG NFR BLD AUTO: 72 % (ref 43–75)
NEUTS SEG NFR BLD AUTO: 76 % (ref 43–75)
NEUTS SEG NFR BLD AUTO: 76 % (ref 43–75)
NEUTS SEG NFR BLD AUTO: 77 % (ref 43–75)
NEUTS SEG NFR BLD AUTO: 78 % (ref 43–75)
NEUTS SEG NFR BLD AUTO: 79 % (ref 43–75)
NEUTS SEG NFR BLD AUTO: 80 % (ref 43–75)
NEUTS SEG NFR BLD AUTO: 84 % (ref 43–75)
NEUTS SEG NFR BLD AUTO: 84 % (ref 45–77)
NEUTS SEG NFR BLD AUTO: 84 % (ref 45–77)
NEUTS SEG NFR BLD AUTO: 85 % (ref 43–75)
NEUTS SEG NFR BLD AUTO: 85 % (ref 43–75)
NEUTS SEG NFR BLD AUTO: 86 % (ref 43–75)
NEUTS SEG NFR BLD AUTO: 86 % (ref 43–75)
NEUTS SEG NFR BLD AUTO: 88 % (ref 43–75)
NEUTS SEG NFR BLD AUTO: 89 % (ref 43–75)
NEUTS SEG NFR BLD AUTO: 90 % (ref 43–75)
NEUTS SEG NFR BLD AUTO: 91 % (ref 43–75)
NEUTS SEG NFR BLD AUTO: 91 % (ref 43–75)
NEUTS SEG NFR BLD AUTO: 92 % (ref 43–75)
NITRITE UR QL STRIP: NEGATIVE
NON VENT HFNC FIO2: 100
NON VENT TYPE HFNC: ABNORMAL
NON VENT TYPE HFNC: ABNORMAL
NON VENT- BIPAP: ABNORMAL
NON-SQ EPI CELLS URNS QL MICRO: ABNORMAL /HPF
NRBC BLD AUTO-RTO: 0 /100 WBCS
NRBC BLD AUTO-RTO: 1 /100 WBC (ref 0–2)
NRBC BLD AUTO-RTO: 1 /100 WBC (ref 0–2)
NRBC BLD AUTO-RTO: 1 /100 WBCS
NRBC BLD AUTO-RTO: 2 /100 WBC (ref 0–2)
NRBC BLD AUTO-RTO: 4 /100 WBC (ref 0–2)
NRBC BLD AUTO-RTO: 4 /100 WBCS
NRBC BLD AUTO-RTO: 7 /100 WBCS
O2 CT BLDA-SCNC: 10.1 ML/DL (ref 16–23)
O2 CT BLDA-SCNC: 11.6 ML/DL (ref 16–23)
O2 CT BLDA-SCNC: 12.1 ML/DL (ref 16–23)
O2 CT BLDA-SCNC: 12.2 ML/DL (ref 16–23)
O2 CT BLDA-SCNC: 12.9 ML/DL (ref 16–23)
O2 CT BLDA-SCNC: 16.2 ML/DL (ref 16–23)
O2 CT BLDA-SCNC: 16.4 ML/DL (ref 16–23)
O2 CT BLDA-SCNC: 17 ML/DL (ref 16–23)
O2 CT BLDA-SCNC: 9.9 ML/DL (ref 16–23)
O2 CT BLDV-SCNC: 1.7 ML/DL
O2 CT BLDV-SCNC: 10.5 ML/DL
O2 CT BLDV-SCNC: 11.2 ML/DL
O2 CT BLDV-SCNC: 11.7 ML/DL
O2 CT BLDV-SCNC: 12.2 ML/DL
O2 CT BLDV-SCNC: 13.1 ML/DL
O2 CT BLDV-SCNC: 13.6 ML/DL
O2 CT BLDV-SCNC: 2.8 ML/DL
O2 CT BLDV-SCNC: 5.1 ML/DL
O2 CT BLDV-SCNC: 6.4 ML/DL
O2 CT BLDV-SCNC: 8.5 ML/DL
O2 CT BLDV-SCNC: 9.8 ML/DL
OVALOCYTES BLD QL SMEAR: PRESENT
OXYHGB MFR BLDA: 89.6 % (ref 94–97)
OXYHGB MFR BLDA: 95.6 % (ref 94–97)
OXYHGB MFR BLDA: 97.5 % (ref 94–97)
OXYHGB MFR BLDA: 97.7 % (ref 94–97)
OXYHGB MFR BLDA: 97.8 % (ref 94–97)
OXYHGB MFR BLDA: 98.3 % (ref 94–97)
OXYHGB MFR BLDA: 99 % (ref 94–97)
OXYHGB MFR BLDA: 99.2 % (ref 94–97)
OXYHGB MFR BLDA: 99.3 % (ref 94–97)
P AXIS: -89 DEGREES
P AXIS: 29 DEGREES
P AXIS: 37 DEGREES
P AXIS: 43 DEGREES
P AXIS: 67 DEGREES
P AXIS: 8 DEGREES
P-ANCA ATYPICAL TITR SER IF: NORMAL TITER
P-ANCA TITR SER IF: NORMAL TITER
PARASITE BLD: NO
PARASITE BLD: NO
PATHOLOGIST INTERPRETATION: NORMAL
PCO2 BLD: 20 MMOL/L (ref 21–32)
PCO2 BLD: 31 MMOL/L (ref 21–32)
PCO2 BLD: 31.7 MM HG (ref 36–44)
PCO2 BLD: 33.4 MM HG (ref 36–44)
PCO2 BLD: 37 MM HG (ref 36–44)
PCO2 BLD: 38 MMOL/L (ref 21–32)
PCO2 BLDA: 24.4 MM HG (ref 36–44)
PCO2 BLDA: 31.7 MM HG (ref 36–44)
PCO2 BLDA: 32.4 MM HG (ref 36–44)
PCO2 BLDA: 34.3 MM HG (ref 36–44)
PCO2 BLDA: 34.9 MM HG (ref 36–44)
PCO2 BLDA: 35.4 MM HG (ref 36–44)
PCO2 BLDA: 39 MM HG (ref 36–44)
PCO2 BLDA: 39.2 MM HG (ref 36–44)
PCO2 BLDA: 61.1 MM HG (ref 36–44)
PCO2 BLDV: 34.9 MM HG (ref 42–50)
PCO2 BLDV: 35.3 MM HG (ref 42–50)
PCO2 BLDV: 35.9 MM HG (ref 42–50)
PCO2 BLDV: 36.6 MM HG (ref 42–50)
PCO2 BLDV: 37.6 MM HG (ref 42–50)
PCO2 BLDV: 37.8 MM HG (ref 42–50)
PCO2 BLDV: 40.3 MM HG (ref 42–50)
PCO2 BLDV: 40.7 MM HG (ref 42–50)
PCO2 BLDV: 44.5 MM HG (ref 42–50)
PCO2 BLDV: 46.5 MM HG (ref 42–50)
PCO2 BLDV: 51.7 MM HG (ref 42–50)
PCO2 BLDV: 52 MM HG (ref 42–50)
PEEP RESPIRATORY: 6 CM[H2O]
PH BLD: 7.39 [PH] (ref 7.35–7.45)
PH BLD: 7.56 [PH] (ref 7.35–7.45)
PH BLD: 7.6 [PH] (ref 7.35–7.45)
PH BLDA: 7.24 [PH] (ref 7.35–7.45)
PH BLDA: 7.39 [PH] (ref 7.35–7.45)
PH BLDA: 7.41 [PH] (ref 7.35–7.45)
PH BLDA: 7.42 [PH] (ref 7.35–7.45)
PH BLDA: 7.46 [PH] (ref 7.35–7.45)
PH BLDA: 7.46 [PH] (ref 7.35–7.45)
PH BLDA: 7.48 [PH] (ref 7.35–7.45)
PH BLDA: 7.5 [PH] (ref 7.35–7.45)
PH BLDA: 7.5 [PH] (ref 7.35–7.45)
PH BLDV: 7.32 [PH] (ref 7.3–7.4)
PH BLDV: 7.34 [PH] (ref 7.3–7.4)
PH BLDV: 7.34 [PH] (ref 7.3–7.4)
PH BLDV: 7.35 [PH] (ref 7.3–7.4)
PH BLDV: 7.35 [PH] (ref 7.3–7.4)
PH BLDV: 7.36 [PH] (ref 7.3–7.4)
PH BLDV: 7.38 [PH] (ref 7.3–7.4)
PH BLDV: 7.4 [PH] (ref 7.3–7.4)
PH BLDV: 7.42 [PH] (ref 7.3–7.4)
PH BLDV: 7.42 [PH] (ref 7.3–7.4)
PH BLDV: 7.43 [PH] (ref 7.3–7.4)
PH BLDV: 7.45 [PH] (ref 7.3–7.4)
PH UR STRIP.AUTO: 5.5 [PH]
PH UR STRIP.AUTO: 6.5 [PH]
PH UR STRIP.AUTO: 7 [PH]
PHOSPHATE SERPL-MCNC: 2 MG/DL (ref 2.3–4.1)
PHOSPHATE SERPL-MCNC: 2.3 MG/DL (ref 2.3–4.1)
PHOSPHATE SERPL-MCNC: 2.3 MG/DL (ref 2.3–4.1)
PHOSPHATE SERPL-MCNC: 2.4 MG/DL (ref 2.3–4.1)
PHOSPHATE SERPL-MCNC: 2.5 MG/DL (ref 2.3–4.1)
PHOSPHATE SERPL-MCNC: 2.6 MG/DL (ref 2.3–4.1)
PHOSPHATE SERPL-MCNC: 2.7 MG/DL (ref 2.3–4.1)
PHOSPHATE SERPL-MCNC: 2.7 MG/DL (ref 2.3–4.1)
PHOSPHATE SERPL-MCNC: 3 MG/DL (ref 2.3–4.1)
PHOSPHATE SERPL-MCNC: 3.4 MG/DL (ref 2.3–4.1)
PHOSPHATE SERPL-MCNC: 3.5 MG/DL (ref 2.3–4.1)
PHOSPHATE SERPL-MCNC: 3.9 MG/DL (ref 2.3–4.1)
PHOSPHATE SERPL-MCNC: 3.9 MG/DL (ref 2.3–4.1)
PHOSPHATE SERPL-MCNC: 4.3 MG/DL (ref 2.3–4.1)
PHOSPHATE SERPL-MCNC: 4.4 MG/DL (ref 2.3–4.1)
PHOSPHATE SERPL-MCNC: 4.4 MG/DL (ref 2.3–4.1)
PHOSPHATE SERPL-MCNC: 8.9 MG/DL (ref 2.3–4.1)
PLATELET # BLD AUTO: 102 THOUSANDS/UL (ref 149–390)
PLATELET # BLD AUTO: 140 THOUSANDS/UL (ref 149–390)
PLATELET # BLD AUTO: 141 THOUSANDS/UL (ref 149–390)
PLATELET # BLD AUTO: 163 THOUSANDS/UL (ref 149–390)
PLATELET # BLD AUTO: 165 THOUSANDS/UL (ref 149–390)
PLATELET # BLD AUTO: 167 THOUSANDS/UL (ref 149–390)
PLATELET # BLD AUTO: 173 THOUSANDS/UL (ref 149–390)
PLATELET # BLD AUTO: 184 THOUSANDS/UL (ref 149–390)
PLATELET # BLD AUTO: 188 THOUSANDS/UL (ref 149–390)
PLATELET # BLD AUTO: 194 THOUSANDS/UL (ref 149–390)
PLATELET # BLD AUTO: 201 THOUSANDS/UL (ref 149–390)
PLATELET # BLD AUTO: 206 THOUSANDS/UL (ref 149–390)
PLATELET # BLD AUTO: 211 THOUSANDS/UL (ref 149–390)
PLATELET # BLD AUTO: 211 THOUSANDS/UL (ref 149–390)
PLATELET # BLD AUTO: 212 THOUSANDS/UL (ref 149–390)
PLATELET # BLD AUTO: 224 THOUSANDS/UL (ref 149–390)
PLATELET # BLD AUTO: 230 THOUSANDS/UL (ref 149–390)
PLATELET # BLD AUTO: 231 THOUSANDS/UL (ref 149–390)
PLATELET # BLD AUTO: 240 THOUSANDS/UL (ref 149–390)
PLATELET # BLD AUTO: 246 THOUSANDS/UL (ref 149–390)
PLATELET # BLD AUTO: 266 THOUSANDS/UL (ref 149–390)
PLATELET # BLD AUTO: 27 THOUSANDS/UL (ref 149–390)
PLATELET # BLD AUTO: 36 THOUSANDS/UL (ref 149–390)
PLATELET # BLD AUTO: 36 THOUSANDS/UL (ref 149–390)
PLATELET # BLD AUTO: 360 THOUSANDS/UL (ref 149–390)
PLATELET # BLD AUTO: 38 THOUSANDS/UL (ref 149–390)
PLATELET # BLD AUTO: 40 THOUSANDS/UL (ref 149–390)
PLATELET # BLD AUTO: 41 THOUSANDS/UL (ref 149–390)
PLATELET # BLD AUTO: 41 THOUSANDS/UL (ref 149–390)
PLATELET # BLD AUTO: 45 THOUSANDS/UL (ref 149–390)
PLATELET # BLD AUTO: 48 THOUSANDS/UL (ref 149–390)
PLATELET # BLD AUTO: 57 THOUSANDS/UL (ref 149–390)
PLATELET # BLD AUTO: 67 THOUSANDS/UL (ref 149–390)
PLATELET # BLD AUTO: 73 THOUSANDS/UL (ref 149–390)
PLATELET # BLD AUTO: 77 THOUSANDS/UL (ref 149–390)
PLATELET # BLD AUTO: 90 THOUSANDS/UL (ref 149–390)
PLATELET # BLD AUTO: 98 THOUSANDS/UL (ref 149–390)
PLATELET BLD QL SMEAR: ABNORMAL
PLATELET BLD QL SMEAR: ADEQUATE
PLATELETS.RETICULATED NFR BLD AUTO: 22.4 %
PMV BLD AUTO: 10.3 FL (ref 8.9–12.7)
PMV BLD AUTO: 10.4 FL (ref 8.9–12.7)
PMV BLD AUTO: 10.5 FL (ref 8.9–12.7)
PMV BLD AUTO: 11 FL (ref 8.9–12.7)
PMV BLD AUTO: 11.5 FL (ref 8.9–12.7)
PMV BLD AUTO: 11.6 FL (ref 8.9–12.7)
PMV BLD AUTO: 11.7 FL (ref 8.9–12.7)
PMV BLD AUTO: 11.7 FL (ref 8.9–12.7)
PMV BLD AUTO: 12 FL (ref 8.9–12.7)
PMV BLD AUTO: 12.2 FL (ref 8.9–12.7)
PMV BLD AUTO: 12.3 FL (ref 8.9–12.7)
PMV BLD AUTO: 12.4 FL (ref 8.9–12.7)
PMV BLD AUTO: 12.7 FL (ref 8.9–12.7)
PMV BLD AUTO: 12.8 FL (ref 8.9–12.7)
PMV BLD AUTO: 13 FL (ref 8.9–12.7)
PMV BLD AUTO: 13.1 FL (ref 8.9–12.7)
PMV BLD AUTO: 13.2 FL (ref 8.9–12.7)
PMV BLD AUTO: 13.3 FL (ref 8.9–12.7)
PMV BLD AUTO: 13.3 FL (ref 8.9–12.7)
PMV BLD AUTO: 13.4 FL (ref 8.9–12.7)
PMV BLD AUTO: 13.7 FL (ref 8.9–12.7)
PMV BLD AUTO: 13.7 FL (ref 8.9–12.7)
PMV BLD AUTO: 13.9 FL (ref 8.9–12.7)
PMV BLD AUTO: 14.2 FL (ref 8.9–12.7)
PMV BLD AUTO: 14.4 FL (ref 8.9–12.7)
PMV BLD AUTO: 14.5 FL (ref 8.9–12.7)
PO2 BLD: 32 MM HG (ref 75–129)
PO2 BLD: 377 MM HG (ref 75–129)
PO2 BLD: 98 MM HG (ref 75–129)
PO2 BLDA: 113.1 MM HG (ref 75–129)
PO2 BLDA: 160 MM HG (ref 75–129)
PO2 BLDA: 163 MM HG (ref 75–129)
PO2 BLDA: 270.6 MM HG (ref 75–129)
PO2 BLDA: 284.2 MM HG (ref 75–129)
PO2 BLDA: 383.8 MM HG (ref 75–129)
PO2 BLDA: 496 MM HG (ref 75–129)
PO2 BLDA: 58.9 MM HG (ref 75–129)
PO2 BLDA: 90.1 MM HG (ref 75–129)
PO2 BLDV: 15.3 MM HG (ref 35–45)
PO2 BLDV: 18 MM HG (ref 35–45)
PO2 BLDV: 25.4 MM HG (ref 35–45)
PO2 BLDV: 27.1 MM HG (ref 35–45)
PO2 BLDV: 35.1 MM HG (ref 35–45)
PO2 BLDV: 37.6 MM HG (ref 35–45)
PO2 BLDV: 40.4 MM HG (ref 35–45)
PO2 BLDV: 44.4 MM HG (ref 35–45)
PO2 BLDV: 46.2 MM HG (ref 35–45)
PO2 BLDV: 46.3 MM HG (ref 35–45)
PO2 BLDV: 53.2 MM HG (ref 35–45)
PO2 BLDV: 58.8 MM HG (ref 35–45)
POLYCHROMASIA BLD QL SMEAR: PRESENT
POTASSIUM BLD-SCNC: 3.3 MMOL/L (ref 3.5–5.3)
POTASSIUM BLD-SCNC: 4 MMOL/L (ref 3.5–5.3)
POTASSIUM BLD-SCNC: 4.1 MMOL/L (ref 3.5–5.3)
POTASSIUM SERPL-SCNC: 2.6 MMOL/L (ref 3.5–5.3)
POTASSIUM SERPL-SCNC: 2.8 MMOL/L (ref 3.5–5.3)
POTASSIUM SERPL-SCNC: 2.8 MMOL/L (ref 3.5–5.3)
POTASSIUM SERPL-SCNC: 2.9 MMOL/L (ref 3.5–5.3)
POTASSIUM SERPL-SCNC: 3.1 MMOL/L (ref 3.5–5.3)
POTASSIUM SERPL-SCNC: 3.1 MMOL/L (ref 3.5–5.3)
POTASSIUM SERPL-SCNC: 3.2 MMOL/L (ref 3.5–5.3)
POTASSIUM SERPL-SCNC: 3.2 MMOL/L (ref 3.5–5.3)
POTASSIUM SERPL-SCNC: 3.3 MMOL/L (ref 3.5–5.3)
POTASSIUM SERPL-SCNC: 3.3 MMOL/L (ref 3.5–5.3)
POTASSIUM SERPL-SCNC: 3.4 MMOL/L (ref 3.5–5.3)
POTASSIUM SERPL-SCNC: 3.5 MMOL/L (ref 3.5–5.3)
POTASSIUM SERPL-SCNC: 3.6 MMOL/L (ref 3.5–5.3)
POTASSIUM SERPL-SCNC: 3.7 MMOL/L (ref 3.5–5.3)
POTASSIUM SERPL-SCNC: 3.8 MMOL/L (ref 3.5–5.3)
POTASSIUM SERPL-SCNC: 3.9 MMOL/L (ref 3.5–5.3)
POTASSIUM SERPL-SCNC: 3.9 MMOL/L (ref 3.5–5.3)
POTASSIUM SERPL-SCNC: 4 MMOL/L (ref 3.5–5.3)
POTASSIUM SERPL-SCNC: 4.1 MMOL/L (ref 3.5–5.3)
POTASSIUM SERPL-SCNC: 4.2 MMOL/L (ref 3.5–5.3)
POTASSIUM SERPL-SCNC: 4.3 MMOL/L (ref 3.5–5.3)
POTASSIUM SERPL-SCNC: 4.4 MMOL/L (ref 3.5–5.3)
POTASSIUM SERPL-SCNC: 4.4 MMOL/L (ref 3.5–5.3)
POTASSIUM SERPL-SCNC: 4.7 MMOL/L (ref 3.5–5.3)
POTASSIUM SERPL-SCNC: 4.8 MMOL/L (ref 3.5–5.3)
POTASSIUM SERPL-SCNC: 4.8 MMOL/L (ref 3.5–5.3)
POTASSIUM SERPL-SCNC: 4.9 MMOL/L (ref 3.5–5.3)
POTASSIUM SERPL-SCNC: 5.2 MMOL/L (ref 3.5–5.3)
PR INTERVAL: 0 MS
PR INTERVAL: 144 MS
PR INTERVAL: 152 MS
PR INTERVAL: 168 MS
PR INTERVAL: 168 MS
PR INTERVAL: 188 MS
PR INTERVAL: 329 MS
PR INTERVAL: 516 MS
PROCALCITONIN SERPL-MCNC: 0.07 NG/ML
PROCALCITONIN SERPL-MCNC: 0.08 NG/ML
PROCALCITONIN SERPL-MCNC: 0.08 NG/ML
PROCALCITONIN SERPL-MCNC: 0.22 NG/ML
PROCALCITONIN SERPL-MCNC: 0.55 NG/ML
PROCALCITONIN SERPL-MCNC: 0.66 NG/ML
PROCALCITONIN SERPL-MCNC: 0.68 NG/ML
PROINSULIN SERPL-SCNC: 5.7 PMOL/L (ref 0–10)
PROT SERPL-MCNC: 4.1 G/DL (ref 6.4–8.4)
PROT SERPL-MCNC: 4.4 G/DL (ref 6.4–8.4)
PROT SERPL-MCNC: 4.4 G/DL (ref 6.4–8.4)
PROT SERPL-MCNC: 4.6 G/DL (ref 6.4–8.4)
PROT SERPL-MCNC: 4.7 G/DL (ref 6.4–8.4)
PROT SERPL-MCNC: 4.7 G/DL (ref 6.4–8.4)
PROT SERPL-MCNC: 4.8 G/DL (ref 6.4–8.4)
PROT SERPL-MCNC: 4.9 G/DL (ref 6.4–8.4)
PROT SERPL-MCNC: 5 G/DL (ref 6.4–8.4)
PROT SERPL-MCNC: 5.1 G/DL (ref 6.4–8.4)
PROT SERPL-MCNC: 5.1 G/DL (ref 6.4–8.4)
PROT SERPL-MCNC: 5.2 G/DL (ref 6.4–8.4)
PROT SERPL-MCNC: 5.3 G/DL (ref 6.4–8.4)
PROT SERPL-MCNC: 5.3 G/DL (ref 6.4–8.4)
PROT SERPL-MCNC: 5.4 G/DL (ref 6.4–8.4)
PROT SERPL-MCNC: 5.4 G/DL (ref 6.4–8.4)
PROT SERPL-MCNC: 5.5 G/DL (ref 6.4–8.4)
PROT SERPL-MCNC: 5.6 G/DL (ref 6.4–8.4)
PROT UR STRIP-MCNC: ABNORMAL MG/DL
PROT UR STRIP-MCNC: ABNORMAL MG/DL
PROT UR STRIP-MCNC: NEGATIVE MG/DL
PROTEINASE3 AB SER IA-ACNC: <0.2 UNITS (ref 0–0.9)
PROTEINASE3 AB SER IA-ACNC: <0.2 UNITS (ref 0–0.9)
PROTHROMBIN TIME: 15.9 SECONDS (ref 11.6–14.5)
PROTHROMBIN TIME: 16.4 SECONDS (ref 11.6–14.5)
PROTHROMBIN TIME: 16.5 SECONDS (ref 11.6–14.5)
PROTHROMBIN TIME: 17.9 SECONDS (ref 11.6–14.5)
PROTHROMBIN TIME: 19.8 SECONDS (ref 11.6–14.5)
PROTHROMBIN TIME: 20.3 SECONDS (ref 11.6–14.5)
PROTHROMBIN TIME: 24.3 SECONDS (ref 11.6–14.5)
PROTHROMBIN TIME: 25.5 SECONDS (ref 11.6–14.5)
PROTHROMBIN TIME: 25.5 SECONDS (ref 11.6–14.5)
PROTHROMBIN TIME: 30.6 SECONDS (ref 11.6–14.5)
PROTHROMBIN TIME: 35.4 SECONDS (ref 11.6–14.5)
PROTHROMBIN TIME: 40.2 SECONDS (ref 11.6–14.5)
PROTHROMBIN TIME: 48 SECONDS (ref 11.6–14.5)
PROTHROMBIN TIME: 62.1 SECONDS (ref 11.6–14.5)
PROTHROMBIN TIME: 75.2 SECONDS (ref 11.6–14.5)
PROTHROMBIN TIME: 90.5 SECONDS (ref 11.6–14.5)
PROTHROMBIN TIME: 92.9 SECONDS (ref 11.6–14.5)
QRS AXIS: -25 DEGREES
QRS AXIS: -34 DEGREES
QRS AXIS: -35 DEGREES
QRS AXIS: -36 DEGREES
QRS AXIS: -38 DEGREES
QRS AXIS: -41 DEGREES
QRS AXIS: -7 DEGREES
QRS AXIS: 0 DEGREES
QRS AXIS: 1 DEGREES
QRS AXIS: 25 DEGREES
QRS AXIS: 28 DEGREES
QRS AXIS: 6 DEGREES
QRS AXIS: 9 DEGREES
QRSD INTERVAL: 100 MS
QRSD INTERVAL: 79 MS
QRSD INTERVAL: 83 MS
QRSD INTERVAL: 88 MS
QRSD INTERVAL: 90 MS
QRSD INTERVAL: 92 MS
QRSD INTERVAL: 94 MS
QRSD INTERVAL: 94 MS
QRSD INTERVAL: 96 MS
QT INTERVAL: 267 MS
QT INTERVAL: 271 MS
QT INTERVAL: 271 MS
QT INTERVAL: 275 MS
QT INTERVAL: 279 MS
QT INTERVAL: 283 MS
QT INTERVAL: 288 MS
QT INTERVAL: 288 MS
QT INTERVAL: 328 MS
QT INTERVAL: 354 MS
QT INTERVAL: 364 MS
QT INTERVAL: 380 MS
QT INTERVAL: 382 MS
QTC INTERVAL: 408 MS
QTC INTERVAL: 412 MS
QTC INTERVAL: 414 MS
QTC INTERVAL: 416 MS
QTC INTERVAL: 419 MS
QTC INTERVAL: 423 MS
QTC INTERVAL: 425 MS
QTC INTERVAL: 446 MS
QTC INTERVAL: 469 MS
QTC INTERVAL: 495 MS
QTC INTERVAL: 495 MS
RBC # BLD AUTO: 2.05 MILLION/UL (ref 3.81–5.12)
RBC # BLD AUTO: 2.11 MILLION/UL (ref 3.81–5.12)
RBC # BLD AUTO: 2.14 MILLION/UL (ref 3.81–5.12)
RBC # BLD AUTO: 2.23 MILLION/UL (ref 3.81–5.12)
RBC # BLD AUTO: 2.36 MILLION/UL (ref 3.81–5.12)
RBC # BLD AUTO: 2.37 MILLION/UL (ref 3.81–5.12)
RBC # BLD AUTO: 2.39 MILLION/UL (ref 3.81–5.12)
RBC # BLD AUTO: 2.56 MILLION/UL (ref 3.81–5.12)
RBC # BLD AUTO: 2.59 MILLION/UL (ref 3.81–5.12)
RBC # BLD AUTO: 2.66 MILLION/UL (ref 3.81–5.12)
RBC # BLD AUTO: 2.7 MILLION/UL (ref 3.81–5.12)
RBC # BLD AUTO: 2.74 MILLION/UL (ref 3.81–5.12)
RBC # BLD AUTO: 2.74 MILLION/UL (ref 3.81–5.12)
RBC # BLD AUTO: 2.75 MILLION/UL (ref 3.81–5.12)
RBC # BLD AUTO: 2.8 MILLION/UL (ref 3.81–5.12)
RBC # BLD AUTO: 2.81 MILLION/UL (ref 3.81–5.12)
RBC # BLD AUTO: 2.83 MILLION/UL (ref 3.81–5.12)
RBC # BLD AUTO: 2.85 MILLION/UL (ref 3.81–5.12)
RBC # BLD AUTO: 2.87 MILLION/UL (ref 3.81–5.12)
RBC # BLD AUTO: 2.89 MILLION/UL (ref 3.81–5.12)
RBC # BLD AUTO: 2.94 MILLION/UL (ref 3.81–5.12)
RBC # BLD AUTO: 3 MILLION/UL (ref 3.81–5.12)
RBC # BLD AUTO: 3.04 MILLION/UL (ref 3.81–5.12)
RBC # BLD AUTO: 3.12 MILLION/UL (ref 3.81–5.12)
RBC # BLD AUTO: 3.15 MILLION/UL (ref 3.81–5.12)
RBC # BLD AUTO: 3.18 MILLION/UL (ref 3.81–5.12)
RBC # BLD AUTO: 3.18 MILLION/UL (ref 3.81–5.12)
RBC # BLD AUTO: 3.19 MILLION/UL (ref 3.81–5.12)
RBC # BLD AUTO: 3.2 MILLION/UL (ref 3.81–5.12)
RBC # BLD AUTO: 3.25 MILLION/UL (ref 3.81–5.12)
RBC # BLD AUTO: 3.44 MILLION/UL (ref 3.81–5.12)
RBC # BLD AUTO: 3.56 MILLION/UL (ref 3.81–5.12)
RBC # BLD AUTO: 3.6 MILLION/UL (ref 3.81–5.12)
RBC # BLD AUTO: 3.71 MILLION/UL (ref 3.81–5.12)
RBC #/AREA URNS AUTO: ABNORMAL /HPF
RBC MORPH BLD: PRESENT
RH BLD: NEGATIVE
RH BLD: NEGATIVE
RHEUMATOID FACT SER QL LA: POSITIVE
RIGHT ATRIAL 2D VOLUME: 79 ML
RIGHT ATRIUM AREA SYSTOLE A4C: 23.6 CM2
RIGHT VENTRICLE ID DIMENSION: 3.3 CM
S PNEUM AG UR QL: NEGATIVE
SAO2 % BLD FROM PO2: 100 % (ref 60–85)
SAO2 % BLD FROM PO2: 62 % (ref 60–85)
SAO2 % BLD FROM PO2: 99 % (ref 60–85)
SARS-COV-2 RNA RESP QL NAA+PROBE: NEGATIVE
SIMV VENT INSPIRED AIR FIO2: 100
SIMV VENT PEEP: 6
SIMV VENT TIDAL VOLUME: 400
SIMV VENT: ABNORMAL
SL CV AV DECELERATION TIME RETROGRADE: 1190 MS
SL CV AV PEAK GRADIENT RETROGRADE: 72 MMHG
SL CV LEFT ATRIUM LENGTH A2C: 5.9 CM
SL CV LV EF: 35
SL CV PED ECHO LEFT VENTRICLE DIASTOLIC VOLUME (MOD BIPLANE) 2D: 143 ML
SL CV PED ECHO LEFT VENTRICLE SYSTOLIC VOLUME (MOD BIPLANE) 2D: 96 ML
SODIUM BLD-SCNC: 140 MMOL/L (ref 136–145)
SODIUM BLD-SCNC: 146 MMOL/L (ref 136–145)
SODIUM BLD-SCNC: 149 MMOL/L (ref 136–145)
SODIUM SERPL-SCNC: 131 MMOL/L (ref 135–147)
SODIUM SERPL-SCNC: 137 MMOL/L (ref 135–147)
SODIUM SERPL-SCNC: 137 MMOL/L (ref 135–147)
SODIUM SERPL-SCNC: 138 MMOL/L (ref 135–147)
SODIUM SERPL-SCNC: 138 MMOL/L (ref 135–147)
SODIUM SERPL-SCNC: 139 MMOL/L (ref 135–147)
SODIUM SERPL-SCNC: 139 MMOL/L (ref 135–147)
SODIUM SERPL-SCNC: 140 MMOL/L (ref 135–147)
SODIUM SERPL-SCNC: 141 MMOL/L (ref 135–147)
SODIUM SERPL-SCNC: 142 MMOL/L (ref 135–147)
SODIUM SERPL-SCNC: 142 MMOL/L (ref 135–147)
SODIUM SERPL-SCNC: 144 MMOL/L (ref 135–147)
SODIUM SERPL-SCNC: 145 MMOL/L (ref 135–147)
SODIUM SERPL-SCNC: 146 MMOL/L (ref 135–147)
SODIUM SERPL-SCNC: 147 MMOL/L (ref 135–147)
SODIUM SERPL-SCNC: 147 MMOL/L (ref 135–147)
SODIUM SERPL-SCNC: 148 MMOL/L (ref 135–147)
SODIUM SERPL-SCNC: 149 MMOL/L (ref 135–147)
SODIUM SERPL-SCNC: 150 MMOL/L (ref 135–147)
SODIUM SERPL-SCNC: 151 MMOL/L (ref 135–147)
SODIUM SERPL-SCNC: 155 MMOL/L (ref 135–147)
SP GR UR STRIP.AUTO: 1.01 (ref 1–1.03)
SP GR UR STRIP.AUTO: 1.02 (ref 1–1.03)
SP GR UR STRIP.AUTO: 1.02 (ref 1–1.03)
SPECIMEN EXPIRATION DATE: NORMAL
SPECIMEN EXPIRATION DATE: NORMAL
SPECIMEN SOURCE: ABNORMAL
T WAVE AXIS: -70 DEGREES
T WAVE AXIS: -79 DEGREES
T WAVE AXIS: 114 DEGREES
T WAVE AXIS: 117 DEGREES
T WAVE AXIS: 117 DEGREES
T WAVE AXIS: 124 DEGREES
T WAVE AXIS: 134 DEGREES
T WAVE AXIS: 167 DEGREES
T WAVE AXIS: 241 DEGREES
T WAVE AXIS: 241 DEGREES
T WAVE AXIS: 37 DEGREES
T WAVE AXIS: 56 DEGREES
T WAVE AXIS: 95 DEGREES
TIBC SERPL-MCNC: 152 UG/DL (ref 250–450)
TIBC SERPL-MCNC: 197 UG/DL (ref 250–450)
TOTAL CELLS COUNTED SPEC: 100
TOTAL CELLS COUNTED SPEC: 100
TPA PPP QL CHRO: 20 % OF NORMAL (ref 77–138)
TPA PPP QL CHRO: 39 % OF NORMAL (ref 77–138)
TR MAX PG: 43 MMHG
TR PEAK VELOCITY: 3.3 M/S
TRICUSPID ANNULAR PLANE SYSTOLIC EXCURSION: 1.4 CM
TRICUSPID VALVE PEAK REGURGITATION VELOCITY: 3.28 M/S
UIBC SERPL-MCNC: 124 UG/DL (ref 155–355)
UIBC SERPL-MCNC: 176 UG/DL (ref 155–355)
UNIT DISPENSE STATUS: NORMAL
UNIT PRODUCT CODE: NORMAL
UNIT PRODUCT VOLUME: 276 ML
UNIT PRODUCT VOLUME: 300 ML
UNIT PRODUCT VOLUME: 300 ML
UNIT PRODUCT VOLUME: 350 ML
UNIT PRODUCT VOLUME: 354 ML
UNIT RH: NORMAL
UROBILINOGEN UR STRIP-ACNC: 2 MG/DL
UROBILINOGEN UR STRIP-ACNC: 3 MG/DL
UROBILINOGEN UR STRIP-ACNC: 3 MG/DL
VANCOMYCIN SERPL-MCNC: 18.3 UG/ML (ref 10–20)
VENT AC: 16
VENT AC: 18
VENT AC: 22
VENT- AC: AC
VENTRICULAR RATE: 101 BPM
VENTRICULAR RATE: 111 BPM
VENTRICULAR RATE: 111 BPM
VENTRICULAR RATE: 124 BPM
VENTRICULAR RATE: 124 BPM
VENTRICULAR RATE: 135 BPM
VENTRICULAR RATE: 136 BPM
VENTRICULAR RATE: 137 BPM
VENTRICULAR RATE: 138 BPM
VENTRICULAR RATE: 140 BPM
VENTRICULAR RATE: 143 BPM
VENTRICULAR RATE: 84 BPM
VENTRICULAR RATE: 92 BPM
VIT B12 SERPL-MCNC: 868 PG/ML (ref 180–914)
VT SETTING VENT: 400 ML
VZV DNA SPEC QL NAA+PROBE: NEGATIVE
WBC # BLD AUTO: 10.33 THOUSAND/UL (ref 4.31–10.16)
WBC # BLD AUTO: 10.35 THOUSAND/UL (ref 4.31–10.16)
WBC # BLD AUTO: 10.65 THOUSAND/UL (ref 4.31–10.16)
WBC # BLD AUTO: 10.96 THOUSAND/UL (ref 4.31–10.16)
WBC # BLD AUTO: 11.29 THOUSAND/UL (ref 4.31–10.16)
WBC # BLD AUTO: 11.79 THOUSAND/UL (ref 4.31–10.16)
WBC # BLD AUTO: 12.03 THOUSAND/UL (ref 4.31–10.16)
WBC # BLD AUTO: 12.23 THOUSAND/UL (ref 4.31–10.16)
WBC # BLD AUTO: 12.28 THOUSAND/UL (ref 4.31–10.16)
WBC # BLD AUTO: 12.35 THOUSAND/UL (ref 4.31–10.16)
WBC # BLD AUTO: 12.6 THOUSAND/UL (ref 4.31–10.16)
WBC # BLD AUTO: 12.76 THOUSAND/UL (ref 4.31–10.16)
WBC # BLD AUTO: 12.93 THOUSAND/UL (ref 4.31–10.16)
WBC # BLD AUTO: 13.32 THOUSAND/UL (ref 4.31–10.16)
WBC # BLD AUTO: 13.4 THOUSAND/UL (ref 4.31–10.16)
WBC # BLD AUTO: 13.57 THOUSAND/UL (ref 4.31–10.16)
WBC # BLD AUTO: 14.49 THOUSAND/UL (ref 4.31–10.16)
WBC # BLD AUTO: 14.73 THOUSAND/UL (ref 4.31–10.16)
WBC # BLD AUTO: 14.82 THOUSAND/UL (ref 4.31–10.16)
WBC # BLD AUTO: 15.49 THOUSAND/UL (ref 4.31–10.16)
WBC # BLD AUTO: 16.33 THOUSAND/UL (ref 4.31–10.16)
WBC # BLD AUTO: 16.55 THOUSAND/UL (ref 4.31–10.16)
WBC # BLD AUTO: 16.87 THOUSAND/UL (ref 4.31–10.16)
WBC # BLD AUTO: 17.67 THOUSAND/UL (ref 4.31–10.16)
WBC # BLD AUTO: 26 THOUSAND/UL (ref 4.31–10.16)
WBC # BLD AUTO: 29.21 THOUSAND/UL (ref 4.31–10.16)
WBC # BLD AUTO: 7.66 THOUSAND/UL (ref 4.31–10.16)
WBC # BLD AUTO: 7.66 THOUSAND/UL (ref 4.31–10.16)
WBC # BLD AUTO: 8.15 THOUSAND/UL (ref 4.31–10.16)
WBC # BLD AUTO: 8.41 THOUSAND/UL (ref 4.31–10.16)
WBC # BLD AUTO: 8.48 THOUSAND/UL (ref 4.31–10.16)
WBC # BLD AUTO: 8.74 THOUSAND/UL (ref 4.31–10.16)
WBC # BLD AUTO: 8.94 THOUSAND/UL (ref 4.31–10.16)
WBC # BLD AUTO: 9.24 THOUSAND/UL (ref 4.31–10.16)
WBC # BLD AUTO: 9.72 THOUSAND/UL (ref 4.31–10.16)
WBC # BLD AUTO: 9.88 THOUSAND/UL (ref 4.31–10.16)
WBC #/AREA URNS AUTO: ABNORMAL /HPF
WBC CLUMPS # UR AUTO: PRESENT /UL

## 2023-01-01 PROCEDURE — 84295 ASSAY OF SERUM SODIUM: CPT

## 2023-01-01 PROCEDURE — NC001 PR NO CHARGE: Performed by: INTERNAL MEDICINE

## 2023-01-01 PROCEDURE — 85025 COMPLETE CBC W/AUTO DIFF WBC: CPT | Performed by: STUDENT IN AN ORGANIZED HEALTH CARE EDUCATION/TRAINING PROGRAM

## 2023-01-01 PROCEDURE — 97112 NEUROMUSCULAR REEDUCATION: CPT

## 2023-01-01 PROCEDURE — 82550 ASSAY OF CK (CPK): CPT

## 2023-01-01 PROCEDURE — 82805 BLOOD GASES W/O2 SATURATION: CPT | Performed by: STUDENT IN AN ORGANIZED HEALTH CARE EDUCATION/TRAINING PROGRAM

## 2023-01-01 PROCEDURE — 84484 ASSAY OF TROPONIN QUANT: CPT | Performed by: STUDENT IN AN ORGANIZED HEALTH CARE EDUCATION/TRAINING PROGRAM

## 2023-01-01 PROCEDURE — 94760 N-INVAS EAR/PLS OXIMETRY 1: CPT

## 2023-01-01 PROCEDURE — 85007 BL SMEAR W/DIFF WBC COUNT: CPT | Performed by: STUDENT IN AN ORGANIZED HEALTH CARE EDUCATION/TRAINING PROGRAM

## 2023-01-01 PROCEDURE — 85027 COMPLETE CBC AUTOMATED: CPT | Performed by: PHYSICIAN ASSISTANT

## 2023-01-01 PROCEDURE — 85730 THROMBOPLASTIN TIME PARTIAL: CPT

## 2023-01-01 PROCEDURE — 82805 BLOOD GASES W/O2 SATURATION: CPT | Performed by: PHYSICIAN ASSISTANT

## 2023-01-01 PROCEDURE — 82948 REAGENT STRIP/BLOOD GLUCOSE: CPT

## 2023-01-01 PROCEDURE — 83735 ASSAY OF MAGNESIUM: CPT | Performed by: INTERNAL MEDICINE

## 2023-01-01 PROCEDURE — 82140 ASSAY OF AMMONIA: CPT | Performed by: PHYSICIAN ASSISTANT

## 2023-01-01 PROCEDURE — 80048 BASIC METABOLIC PNL TOTAL CA: CPT | Performed by: PHYSICIAN ASSISTANT

## 2023-01-01 PROCEDURE — 86803 HEPATITIS C AB TEST: CPT | Performed by: INTERNAL MEDICINE

## 2023-01-01 PROCEDURE — 0DH63UZ INSERTION OF FEEDING DEVICE INTO STOMACH, PERCUTANEOUS APPROACH: ICD-10-PCS | Performed by: INTERNAL MEDICINE

## 2023-01-01 PROCEDURE — 82728 ASSAY OF FERRITIN: CPT | Performed by: STUDENT IN AN ORGANIZED HEALTH CARE EDUCATION/TRAINING PROGRAM

## 2023-01-01 PROCEDURE — 83525 ASSAY OF INSULIN: CPT | Performed by: INTERNAL MEDICINE

## 2023-01-01 PROCEDURE — 93010 ELECTROCARDIOGRAM REPORT: CPT

## 2023-01-01 PROCEDURE — 94003 VENT MGMT INPAT SUBQ DAY: CPT

## 2023-01-01 PROCEDURE — 80053 COMPREHEN METABOLIC PANEL: CPT | Performed by: INTERNAL MEDICINE

## 2023-01-01 PROCEDURE — 97530 THERAPEUTIC ACTIVITIES: CPT

## 2023-01-01 PROCEDURE — 93005 ELECTROCARDIOGRAM TRACING: CPT

## 2023-01-01 PROCEDURE — 82977 ASSAY OF GGT: CPT

## 2023-01-01 PROCEDURE — 80202 ASSAY OF VANCOMYCIN: CPT | Performed by: INTERNAL MEDICINE

## 2023-01-01 PROCEDURE — 71045 X-RAY EXAM CHEST 1 VIEW: CPT

## 2023-01-01 PROCEDURE — 4A133B1 MONITORING OF ARTERIAL PRESSURE, PERIPHERAL, PERCUTANEOUS APPROACH: ICD-10-PCS | Performed by: INTERNAL MEDICINE

## 2023-01-01 PROCEDURE — C9113 INJ PANTOPRAZOLE SODIUM, VIA: HCPCS | Performed by: INTERNAL MEDICINE

## 2023-01-01 PROCEDURE — 74176 CT ABD & PELVIS W/O CONTRAST: CPT

## 2023-01-01 PROCEDURE — 82947 ASSAY GLUCOSE BLOOD QUANT: CPT

## 2023-01-01 PROCEDURE — 86430 RHEUMATOID FACTOR TEST QUAL: CPT | Performed by: STUDENT IN AN ORGANIZED HEALTH CARE EDUCATION/TRAINING PROGRAM

## 2023-01-01 PROCEDURE — 99291 CRITICAL CARE FIRST HOUR: CPT | Performed by: INTERNAL MEDICINE

## 2023-01-01 PROCEDURE — 81001 URINALYSIS AUTO W/SCOPE: CPT | Performed by: STUDENT IN AN ORGANIZED HEALTH CARE EDUCATION/TRAINING PROGRAM

## 2023-01-01 PROCEDURE — 87806 HIV AG W/HIV1&2 ANTB W/OPTIC: CPT | Performed by: INTERNAL MEDICINE

## 2023-01-01 PROCEDURE — 30233N1 TRANSFUSION OF NONAUTOLOGOUS RED BLOOD CELLS INTO PERIPHERAL VEIN, PERCUTANEOUS APPROACH: ICD-10-PCS | Performed by: FAMILY MEDICINE

## 2023-01-01 PROCEDURE — 76705 ECHO EXAM OF ABDOMEN: CPT

## 2023-01-01 PROCEDURE — 80053 COMPREHEN METABOLIC PANEL: CPT | Performed by: PHYSICIAN ASSISTANT

## 2023-01-01 PROCEDURE — 83735 ASSAY OF MAGNESIUM: CPT | Performed by: STUDENT IN AN ORGANIZED HEALTH CARE EDUCATION/TRAINING PROGRAM

## 2023-01-01 PROCEDURE — 83735 ASSAY OF MAGNESIUM: CPT | Performed by: NURSE PRACTITIONER

## 2023-01-01 PROCEDURE — 80053 COMPREHEN METABOLIC PANEL: CPT | Performed by: STUDENT IN AN ORGANIZED HEALTH CARE EDUCATION/TRAINING PROGRAM

## 2023-01-01 PROCEDURE — 84100 ASSAY OF PHOSPHORUS: CPT | Performed by: NURSE PRACTITIONER

## 2023-01-01 PROCEDURE — 85362 FIBRIN DEGRADATION PRODUCTS: CPT | Performed by: STUDENT IN AN ORGANIZED HEALTH CARE EDUCATION/TRAINING PROGRAM

## 2023-01-01 PROCEDURE — 84681 ASSAY OF C-PEPTIDE: CPT | Performed by: INTERNAL MEDICINE

## 2023-01-01 PROCEDURE — 99232 SBSQ HOSP IP/OBS MODERATE 35: CPT | Performed by: INTERNAL MEDICINE

## 2023-01-01 PROCEDURE — 93321 DOPPLER ECHO F-UP/LMTD STD: CPT

## 2023-01-01 PROCEDURE — 80048 BASIC METABOLIC PNL TOTAL CA: CPT

## 2023-01-01 PROCEDURE — 85007 BL SMEAR W/DIFF WBC COUNT: CPT | Performed by: NURSE PRACTITIONER

## 2023-01-01 PROCEDURE — 83540 ASSAY OF IRON: CPT | Performed by: INTERNAL MEDICINE

## 2023-01-01 PROCEDURE — 85027 COMPLETE CBC AUTOMATED: CPT

## 2023-01-01 PROCEDURE — 85379 FIBRIN DEGRADATION QUANT: CPT

## 2023-01-01 PROCEDURE — 94002 VENT MGMT INPAT INIT DAY: CPT

## 2023-01-01 PROCEDURE — 83010 ASSAY OF HAPTOGLOBIN QUANT: CPT | Performed by: NURSE PRACTITIONER

## 2023-01-01 PROCEDURE — 86850 RBC ANTIBODY SCREEN: CPT

## 2023-01-01 PROCEDURE — 96375 TX/PRO/DX INJ NEW DRUG ADDON: CPT

## 2023-01-01 PROCEDURE — 99233 SBSQ HOSP IP/OBS HIGH 50: CPT | Performed by: INTERNAL MEDICINE

## 2023-01-01 PROCEDURE — 94640 AIRWAY INHALATION TREATMENT: CPT

## 2023-01-01 PROCEDURE — 85007 BL SMEAR W/DIFF WBC COUNT: CPT | Performed by: PHYSICIAN ASSISTANT

## 2023-01-01 PROCEDURE — 76700 US EXAM ABDOM COMPLETE: CPT

## 2023-01-01 PROCEDURE — 86665 EPSTEIN-BARR CAPSID VCA: CPT | Performed by: INTERNAL MEDICINE

## 2023-01-01 PROCEDURE — 81001 URINALYSIS AUTO W/SCOPE: CPT

## 2023-01-01 PROCEDURE — 83605 ASSAY OF LACTIC ACID: CPT | Performed by: STUDENT IN AN ORGANIZED HEALTH CARE EDUCATION/TRAINING PROGRAM

## 2023-01-01 PROCEDURE — 70450 CT HEAD/BRAIN W/O DYE: CPT

## 2023-01-01 PROCEDURE — 84484 ASSAY OF TROPONIN QUANT: CPT

## 2023-01-01 PROCEDURE — 93010 ELECTROCARDIOGRAM REPORT: CPT | Performed by: INTERNAL MEDICINE

## 2023-01-01 PROCEDURE — 85027 COMPLETE CBC AUTOMATED: CPT | Performed by: NURSE PRACTITIONER

## 2023-01-01 PROCEDURE — 83735 ASSAY OF MAGNESIUM: CPT | Performed by: PHYSICIAN ASSISTANT

## 2023-01-01 PROCEDURE — 93325 DOPPLER ECHO COLOR FLOW MAPG: CPT | Performed by: INTERNAL MEDICINE

## 2023-01-01 PROCEDURE — 84145 PROCALCITONIN (PCT): CPT | Performed by: PHYSICIAN ASSISTANT

## 2023-01-01 PROCEDURE — 85610 PROTHROMBIN TIME: CPT | Performed by: NURSE PRACTITIONER

## 2023-01-01 PROCEDURE — 85300 ANTITHROMBIN III ACTIVITY: CPT | Performed by: STUDENT IN AN ORGANIZED HEALTH CARE EDUCATION/TRAINING PROGRAM

## 2023-01-01 PROCEDURE — 86664 EPSTEIN-BARR NUCLEAR ANTIGEN: CPT | Performed by: INTERNAL MEDICINE

## 2023-01-01 PROCEDURE — 85027 COMPLETE CBC AUTOMATED: CPT | Performed by: INTERNAL MEDICINE

## 2023-01-01 PROCEDURE — 86140 C-REACTIVE PROTEIN: CPT | Performed by: STUDENT IN AN ORGANIZED HEALTH CARE EDUCATION/TRAINING PROGRAM

## 2023-01-01 PROCEDURE — 82248 BILIRUBIN DIRECT: CPT | Performed by: STUDENT IN AN ORGANIZED HEALTH CARE EDUCATION/TRAINING PROGRAM

## 2023-01-01 PROCEDURE — 82947 ASSAY GLUCOSE BLOOD QUANT: CPT | Performed by: INTERNAL MEDICINE

## 2023-01-01 PROCEDURE — 93308 TTE F-UP OR LMTD: CPT

## 2023-01-01 PROCEDURE — 82805 BLOOD GASES W/O2 SATURATION: CPT | Performed by: INTERNAL MEDICINE

## 2023-01-01 PROCEDURE — 96365 THER/PROPH/DIAG IV INF INIT: CPT

## 2023-01-01 PROCEDURE — G1004 CDSM NDSC: HCPCS

## 2023-01-01 PROCEDURE — 96366 THER/PROPH/DIAG IV INF ADDON: CPT

## 2023-01-01 PROCEDURE — 84132 ASSAY OF SERUM POTASSIUM: CPT | Performed by: STUDENT IN AN ORGANIZED HEALTH CARE EDUCATION/TRAINING PROGRAM

## 2023-01-01 PROCEDURE — 36556 INSERT NON-TUNNEL CV CATH: CPT | Performed by: INTERNAL MEDICINE

## 2023-01-01 PROCEDURE — 82947 ASSAY GLUCOSE BLOOD QUANT: CPT | Performed by: PHYSICIAN ASSISTANT

## 2023-01-01 PROCEDURE — 84100 ASSAY OF PHOSPHORUS: CPT | Performed by: INTERNAL MEDICINE

## 2023-01-01 PROCEDURE — 82805 BLOOD GASES W/O2 SATURATION: CPT

## 2023-01-01 PROCEDURE — 85025 COMPLETE CBC W/AUTO DIFF WBC: CPT | Performed by: INTERNAL MEDICINE

## 2023-01-01 PROCEDURE — 80053 COMPREHEN METABOLIC PANEL: CPT

## 2023-01-01 PROCEDURE — 82140 ASSAY OF AMMONIA: CPT

## 2023-01-01 PROCEDURE — NC001 PR NO CHARGE: Performed by: EMERGENCY MEDICINE

## 2023-01-01 PROCEDURE — 80053 COMPREHEN METABOLIC PANEL: CPT | Performed by: NURSE PRACTITIONER

## 2023-01-01 PROCEDURE — 97163 PT EVAL HIGH COMPLEX 45 MIN: CPT

## 2023-01-01 PROCEDURE — 84132 ASSAY OF SERUM POTASSIUM: CPT

## 2023-01-01 PROCEDURE — 86038 ANTINUCLEAR ANTIBODIES: CPT | Performed by: STUDENT IN AN ORGANIZED HEALTH CARE EDUCATION/TRAINING PROGRAM

## 2023-01-01 PROCEDURE — 86160 COMPLEMENT ANTIGEN: CPT | Performed by: STUDENT IN AN ORGANIZED HEALTH CARE EDUCATION/TRAINING PROGRAM

## 2023-01-01 PROCEDURE — 36620 INSERTION CATHETER ARTERY: CPT | Performed by: PHYSICIAN ASSISTANT

## 2023-01-01 PROCEDURE — 84100 ASSAY OF PHOSPHORUS: CPT | Performed by: PHYSICIAN ASSISTANT

## 2023-01-01 PROCEDURE — 36415 COLL VENOUS BLD VENIPUNCTURE: CPT

## 2023-01-01 PROCEDURE — 87207 SMEAR SPECIAL STAIN: CPT | Performed by: NURSE PRACTITIONER

## 2023-01-01 PROCEDURE — 84100 ASSAY OF PHOSPHORUS: CPT | Performed by: STUDENT IN AN ORGANIZED HEALTH CARE EDUCATION/TRAINING PROGRAM

## 2023-01-01 PROCEDURE — 97167 OT EVAL HIGH COMPLEX 60 MIN: CPT

## 2023-01-01 PROCEDURE — 94660 CPAP INITIATION&MGMT: CPT

## 2023-01-01 PROCEDURE — 85025 COMPLETE CBC W/AUTO DIFF WBC: CPT

## 2023-01-01 PROCEDURE — 36600 WITHDRAWAL OF ARTERIAL BLOOD: CPT

## 2023-01-01 PROCEDURE — 80048 BASIC METABOLIC PNL TOTAL CA: CPT | Performed by: INTERNAL MEDICINE

## 2023-01-01 PROCEDURE — 80076 HEPATIC FUNCTION PANEL: CPT | Performed by: STUDENT IN AN ORGANIZED HEALTH CARE EDUCATION/TRAINING PROGRAM

## 2023-01-01 PROCEDURE — 85610 PROTHROMBIN TIME: CPT | Performed by: INTERNAL MEDICINE

## 2023-01-01 PROCEDURE — 82330 ASSAY OF CALCIUM: CPT

## 2023-01-01 PROCEDURE — 82330 ASSAY OF CALCIUM: CPT | Performed by: PHYSICIAN ASSISTANT

## 2023-01-01 PROCEDURE — 30233N1 TRANSFUSION OF NONAUTOLOGOUS RED BLOOD CELLS INTO PERIPHERAL VEIN, PERCUTANEOUS APPROACH: ICD-10-PCS

## 2023-01-01 PROCEDURE — 82533 TOTAL CORTISOL: CPT | Performed by: INTERNAL MEDICINE

## 2023-01-01 PROCEDURE — 85014 HEMATOCRIT: CPT

## 2023-01-01 PROCEDURE — 85027 COMPLETE CBC AUTOMATED: CPT | Performed by: STUDENT IN AN ORGANIZED HEALTH CARE EDUCATION/TRAINING PROGRAM

## 2023-01-01 PROCEDURE — 87798 DETECT AGENT NOS DNA AMP: CPT | Performed by: INTERNAL MEDICINE

## 2023-01-01 PROCEDURE — 83880 ASSAY OF NATRIURETIC PEPTIDE: CPT

## 2023-01-01 PROCEDURE — 86663 EPSTEIN-BARR ANTIBODY: CPT | Performed by: INTERNAL MEDICINE

## 2023-01-01 PROCEDURE — 86225 DNA ANTIBODY NATIVE: CPT | Performed by: STUDENT IN AN ORGANIZED HEALTH CARE EDUCATION/TRAINING PROGRAM

## 2023-01-01 PROCEDURE — 80048 BASIC METABOLIC PNL TOTAL CA: CPT | Performed by: STUDENT IN AN ORGANIZED HEALTH CARE EDUCATION/TRAINING PROGRAM

## 2023-01-01 PROCEDURE — 87529 HSV DNA AMP PROBE: CPT | Performed by: INTERNAL MEDICINE

## 2023-01-01 PROCEDURE — 74230 X-RAY XM SWLNG FUNCJ C+: CPT

## 2023-01-01 PROCEDURE — 87207 SMEAR SPECIAL STAIN: CPT | Performed by: STUDENT IN AN ORGANIZED HEALTH CARE EDUCATION/TRAINING PROGRAM

## 2023-01-01 PROCEDURE — 93356 MYOCRD STRAIN IMG SPCKL TRCK: CPT | Performed by: INTERNAL MEDICINE

## 2023-01-01 PROCEDURE — 86147 CARDIOLIPIN ANTIBODY EA IG: CPT | Performed by: STUDENT IN AN ORGANIZED HEALTH CARE EDUCATION/TRAINING PROGRAM

## 2023-01-01 PROCEDURE — 85362 FIBRIN DEGRADATION PRODUCTS: CPT

## 2023-01-01 PROCEDURE — 86235 NUCLEAR ANTIGEN ANTIBODY: CPT | Performed by: INTERNAL MEDICINE

## 2023-01-01 PROCEDURE — 31500 INSERT EMERGENCY AIRWAY: CPT

## 2023-01-01 PROCEDURE — 80074 ACUTE HEPATITIS PANEL: CPT | Performed by: NURSE PRACTITIONER

## 2023-01-01 PROCEDURE — 82728 ASSAY OF FERRITIN: CPT | Performed by: INTERNAL MEDICINE

## 2023-01-01 PROCEDURE — 4A133J1 MONITORING OF ARTERIAL PULSE, PERIPHERAL, PERCUTANEOUS APPROACH: ICD-10-PCS | Performed by: INTERNAL MEDICINE

## 2023-01-01 PROCEDURE — 87521 HEPATITIS C PROBE&RVRS TRNSC: CPT | Performed by: NURSE PRACTITIONER

## 2023-01-01 PROCEDURE — 82803 BLOOD GASES ANY COMBINATION: CPT

## 2023-01-01 PROCEDURE — 92610 EVALUATE SWALLOWING FUNCTION: CPT

## 2023-01-01 PROCEDURE — 0BH17EZ INSERTION OF ENDOTRACHEAL AIRWAY INTO TRACHEA, VIA NATURAL OR ARTIFICIAL OPENING: ICD-10-PCS | Performed by: INTERNAL MEDICINE

## 2023-01-01 PROCEDURE — 83550 IRON BINDING TEST: CPT | Performed by: STUDENT IN AN ORGANIZED HEALTH CARE EDUCATION/TRAINING PROGRAM

## 2023-01-01 PROCEDURE — 71250 CT THORAX DX C-: CPT

## 2023-01-01 PROCEDURE — 83615 LACTATE (LD) (LDH) ENZYME: CPT | Performed by: NURSE PRACTITIONER

## 2023-01-01 PROCEDURE — 99223 1ST HOSP IP/OBS HIGH 75: CPT | Performed by: PHYSICIAN ASSISTANT

## 2023-01-01 PROCEDURE — 82746 ASSAY OF FOLIC ACID SERUM: CPT | Performed by: INTERNAL MEDICINE

## 2023-01-01 PROCEDURE — 97535 SELF CARE MNGMENT TRAINING: CPT

## 2023-01-01 PROCEDURE — 99233 SBSQ HOSP IP/OBS HIGH 50: CPT | Performed by: STUDENT IN AN ORGANIZED HEALTH CARE EDUCATION/TRAINING PROGRAM

## 2023-01-01 PROCEDURE — 5A12012 PERFORMANCE OF CARDIAC OUTPUT, SINGLE, MANUAL: ICD-10-PCS | Performed by: INTERNAL MEDICINE

## 2023-01-01 PROCEDURE — 93010 ELECTROCARDIOGRAM REPORT: CPT | Performed by: STUDENT IN AN ORGANIZED HEALTH CARE EDUCATION/TRAINING PROGRAM

## 2023-01-01 PROCEDURE — 04HY32Z INSERTION OF MONITORING DEVICE INTO LOWER ARTERY, PERCUTANEOUS APPROACH: ICD-10-PCS | Performed by: INTERNAL MEDICINE

## 2023-01-01 PROCEDURE — 84145 PROCALCITONIN (PCT): CPT | Performed by: STUDENT IN AN ORGANIZED HEALTH CARE EDUCATION/TRAINING PROGRAM

## 2023-01-01 PROCEDURE — 83735 ASSAY OF MAGNESIUM: CPT

## 2023-01-01 PROCEDURE — 86037 ANCA TITER EACH ANTIBODY: CPT | Performed by: STUDENT IN AN ORGANIZED HEALTH CARE EDUCATION/TRAINING PROGRAM

## 2023-01-01 PROCEDURE — 87086 URINE CULTURE/COLONY COUNT: CPT | Performed by: STUDENT IN AN ORGANIZED HEALTH CARE EDUCATION/TRAINING PROGRAM

## 2023-01-01 PROCEDURE — 83520 IMMUNOASSAY QUANT NOS NONAB: CPT | Performed by: STUDENT IN AN ORGANIZED HEALTH CARE EDUCATION/TRAINING PROGRAM

## 2023-01-01 PROCEDURE — 83550 IRON BINDING TEST: CPT | Performed by: INTERNAL MEDICINE

## 2023-01-01 PROCEDURE — NC001 PR NO CHARGE: Performed by: PHYSICIAN ASSISTANT

## 2023-01-01 PROCEDURE — 85730 THROMBOPLASTIN TIME PARTIAL: CPT | Performed by: STUDENT IN AN ORGANIZED HEALTH CARE EDUCATION/TRAINING PROGRAM

## 2023-01-01 PROCEDURE — 99223 1ST HOSP IP/OBS HIGH 75: CPT | Performed by: INTERNAL MEDICINE

## 2023-01-01 PROCEDURE — 80162 ASSAY OF DIGOXIN TOTAL: CPT | Performed by: NURSE PRACTITIONER

## 2023-01-01 PROCEDURE — 93308 TTE F-UP OR LMTD: CPT | Performed by: INTERNAL MEDICINE

## 2023-01-01 PROCEDURE — 30233K1 TRANSFUSION OF NONAUTOLOGOUS FROZEN PLASMA INTO PERIPHERAL VEIN, PERCUTANEOUS APPROACH: ICD-10-PCS

## 2023-01-01 PROCEDURE — 85420 FIBRINOLYTIC PLASMINOGEN: CPT

## 2023-01-01 PROCEDURE — 92526 ORAL FUNCTION THERAPY: CPT

## 2023-01-01 PROCEDURE — 80048 BASIC METABOLIC PNL TOTAL CA: CPT | Performed by: NURSE PRACTITIONER

## 2023-01-01 PROCEDURE — 86923 COMPATIBILITY TEST ELECTRIC: CPT

## 2023-01-01 PROCEDURE — P9016 RBC LEUKOCYTES REDUCED: HCPCS

## 2023-01-01 PROCEDURE — 85610 PROTHROMBIN TIME: CPT | Performed by: STUDENT IN AN ORGANIZED HEALTH CARE EDUCATION/TRAINING PROGRAM

## 2023-01-01 PROCEDURE — 86850 RBC ANTIBODY SCREEN: CPT | Performed by: NURSE PRACTITIONER

## 2023-01-01 PROCEDURE — 85610 PROTHROMBIN TIME: CPT

## 2023-01-01 PROCEDURE — 85055 RETICULATED PLATELET ASSAY: CPT | Performed by: STUDENT IN AN ORGANIZED HEALTH CARE EDUCATION/TRAINING PROGRAM

## 2023-01-01 PROCEDURE — P9037 PLATE PHERES LEUKOREDU IRRAD: HCPCS

## 2023-01-01 PROCEDURE — 87040 BLOOD CULTURE FOR BACTERIA: CPT | Performed by: STUDENT IN AN ORGANIZED HEALTH CARE EDUCATION/TRAINING PROGRAM

## 2023-01-01 PROCEDURE — 83615 LACTATE (LD) (LDH) ENZYME: CPT | Performed by: STUDENT IN AN ORGANIZED HEALTH CARE EDUCATION/TRAINING PROGRAM

## 2023-01-01 PROCEDURE — 96361 HYDRATE IV INFUSION ADD-ON: CPT

## 2023-01-01 PROCEDURE — 86200 CCP ANTIBODY: CPT | Performed by: STUDENT IN AN ORGANIZED HEALTH CARE EDUCATION/TRAINING PROGRAM

## 2023-01-01 PROCEDURE — 87340 HEPATITIS B SURFACE AG IA: CPT | Performed by: INTERNAL MEDICINE

## 2023-01-01 PROCEDURE — 85007 BL SMEAR W/DIFF WBC COUNT: CPT

## 2023-01-01 PROCEDURE — 82533 TOTAL CORTISOL: CPT | Performed by: STUDENT IN AN ORGANIZED HEALTH CARE EDUCATION/TRAINING PROGRAM

## 2023-01-01 PROCEDURE — 80143 DRUG ASSAY ACETAMINOPHEN: CPT | Performed by: NURSE PRACTITIONER

## 2023-01-01 PROCEDURE — 85384 FIBRINOGEN ACTIVITY: CPT

## 2023-01-01 PROCEDURE — 85379 FIBRIN DEGRADATION QUANT: CPT | Performed by: STUDENT IN AN ORGANIZED HEALTH CARE EDUCATION/TRAINING PROGRAM

## 2023-01-01 PROCEDURE — 86901 BLOOD TYPING SEROLOGIC RH(D): CPT | Performed by: NURSE PRACTITIONER

## 2023-01-01 PROCEDURE — 93325 DOPPLER ECHO COLOR FLOW MAPG: CPT

## 2023-01-01 PROCEDURE — 86900 BLOOD TYPING SEROLOGIC ABO: CPT

## 2023-01-01 PROCEDURE — 82330 ASSAY OF CALCIUM: CPT | Performed by: NURSE PRACTITIONER

## 2023-01-01 PROCEDURE — 30233R1 TRANSFUSION OF NONAUTOLOGOUS PLATELETS INTO PERIPHERAL VEIN, PERCUTANEOUS APPROACH: ICD-10-PCS

## 2023-01-01 PROCEDURE — P9017 PLASMA 1 DONOR FRZ W/IN 8 HR: HCPCS

## 2023-01-01 PROCEDURE — 80076 HEPATIC FUNCTION PANEL: CPT | Performed by: PHYSICIAN ASSISTANT

## 2023-01-01 PROCEDURE — 83010 ASSAY OF HAPTOGLOBIN QUANT: CPT | Performed by: INTERNAL MEDICINE

## 2023-01-01 PROCEDURE — 99447 NTRPROF PH1/NTRNET/EHR 11-20: CPT | Performed by: EMERGENCY MEDICINE

## 2023-01-01 PROCEDURE — 85384 FIBRINOGEN ACTIVITY: CPT | Performed by: STUDENT IN AN ORGANIZED HEALTH CARE EDUCATION/TRAINING PROGRAM

## 2023-01-01 PROCEDURE — 86431 RHEUMATOID FACTOR QUANT: CPT | Performed by: STUDENT IN AN ORGANIZED HEALTH CARE EDUCATION/TRAINING PROGRAM

## 2023-01-01 PROCEDURE — 93321 DOPPLER ECHO F-UP/LMTD STD: CPT | Performed by: INTERNAL MEDICINE

## 2023-01-01 PROCEDURE — 86235 NUCLEAR ANTIGEN ANTIBODY: CPT | Performed by: STUDENT IN AN ORGANIZED HEALTH CARE EDUCATION/TRAINING PROGRAM

## 2023-01-01 PROCEDURE — 80162 ASSAY OF DIGOXIN TOTAL: CPT | Performed by: STUDENT IN AN ORGANIZED HEALTH CARE EDUCATION/TRAINING PROGRAM

## 2023-01-01 PROCEDURE — 85018 HEMOGLOBIN: CPT

## 2023-01-01 PROCEDURE — 87635 SARS-COV-2 COVID-19 AMP PRB: CPT | Performed by: PHYSICIAN ASSISTANT

## 2023-01-01 PROCEDURE — 87040 BLOOD CULTURE FOR BACTERIA: CPT

## 2023-01-01 PROCEDURE — 83615 LACTATE (LD) (LDH) ENZYME: CPT | Performed by: INTERNAL MEDICINE

## 2023-01-01 PROCEDURE — 87522 HEPATITIS C REVRS TRNSCRPJ: CPT | Performed by: INTERNAL MEDICINE

## 2023-01-01 PROCEDURE — 31500 INSERT EMERGENCY AIRWAY: CPT | Performed by: NURSE PRACTITIONER

## 2023-01-01 PROCEDURE — NC001 PR NO CHARGE: Performed by: NURSE PRACTITIONER

## 2023-01-01 PROCEDURE — 85300 ANTITHROMBIN III ACTIVITY: CPT

## 2023-01-01 PROCEDURE — 85420 FIBRINOLYTIC PLASMINOGEN: CPT | Performed by: STUDENT IN AN ORGANIZED HEALTH CARE EDUCATION/TRAINING PROGRAM

## 2023-01-01 PROCEDURE — 93970 EXTREMITY STUDY: CPT

## 2023-01-01 PROCEDURE — 93970 EXTREMITY STUDY: CPT | Performed by: SURGERY

## 2023-01-01 PROCEDURE — 83540 ASSAY OF IRON: CPT | Performed by: STUDENT IN AN ORGANIZED HEALTH CARE EDUCATION/TRAINING PROGRAM

## 2023-01-01 PROCEDURE — 84145 PROCALCITONIN (PCT): CPT

## 2023-01-01 PROCEDURE — 83003 ASSAY GROWTH HORMONE (HGH): CPT | Performed by: INTERNAL MEDICINE

## 2023-01-01 PROCEDURE — 87449 NOS EACH ORGANISM AG IA: CPT | Performed by: NURSE PRACTITIONER

## 2023-01-01 PROCEDURE — 94664 DEMO&/EVAL PT USE INHALER: CPT

## 2023-01-01 PROCEDURE — 84100 ASSAY OF PHOSPHORUS: CPT

## 2023-01-01 PROCEDURE — 99291 CRITICAL CARE FIRST HOUR: CPT | Performed by: EMERGENCY MEDICINE

## 2023-01-01 PROCEDURE — 5A1955Z RESPIRATORY VENTILATION, GREATER THAN 96 CONSECUTIVE HOURS: ICD-10-PCS | Performed by: INTERNAL MEDICINE

## 2023-01-01 PROCEDURE — 87522 HEPATITIS C REVRS TRNSCRPJ: CPT | Performed by: NURSE PRACTITIONER

## 2023-01-01 PROCEDURE — 99285 EMERGENCY DEPT VISIT HI MDM: CPT

## 2023-01-01 PROCEDURE — 83605 ASSAY OF LACTIC ACID: CPT

## 2023-01-01 PROCEDURE — 87521 HEPATITIS C PROBE&RVRS TRNSC: CPT | Performed by: INTERNAL MEDICINE

## 2023-01-01 PROCEDURE — 80143 DRUG ASSAY ACETAMINOPHEN: CPT | Performed by: INTERNAL MEDICINE

## 2023-01-01 PROCEDURE — 43246 EGD PLACE GASTROSTOMY TUBE: CPT | Performed by: INTERNAL MEDICINE

## 2023-01-01 PROCEDURE — 84484 ASSAY OF TROPONIN QUANT: CPT | Performed by: PHYSICIAN ASSISTANT

## 2023-01-01 PROCEDURE — 85049 AUTOMATED PLATELET COUNT: CPT | Performed by: STUDENT IN AN ORGANIZED HEALTH CARE EDUCATION/TRAINING PROGRAM

## 2023-01-01 PROCEDURE — 05HM33Z INSERTION OF INFUSION DEVICE INTO RIGHT INTERNAL JUGULAR VEIN, PERCUTANEOUS APPROACH: ICD-10-PCS | Performed by: INTERNAL MEDICINE

## 2023-01-01 PROCEDURE — 85049 AUTOMATED PLATELET COUNT: CPT

## 2023-01-01 PROCEDURE — 82607 VITAMIN B-12: CPT | Performed by: INTERNAL MEDICINE

## 2023-01-01 PROCEDURE — 74177 CT ABD & PELVIS W/CONTRAST: CPT

## 2023-01-01 PROCEDURE — 36620 INSERTION CATHETER ARTERY: CPT | Performed by: INTERNAL MEDICINE

## 2023-01-01 PROCEDURE — 05HC33Z INSERTION OF INFUSION DEVICE INTO LEFT BASILIC VEIN, PERCUTANEOUS APPROACH: ICD-10-PCS | Performed by: INTERNAL MEDICINE

## 2023-01-01 PROCEDURE — 97110 THERAPEUTIC EXERCISES: CPT

## 2023-01-01 PROCEDURE — 86901 BLOOD TYPING SEROLOGIC RH(D): CPT

## 2023-01-01 PROCEDURE — 84206 ASSAY OF PROINSULIN: CPT | Performed by: INTERNAL MEDICINE

## 2023-01-01 PROCEDURE — 86900 BLOOD TYPING SEROLOGIC ABO: CPT | Performed by: NURSE PRACTITIONER

## 2023-01-01 PROCEDURE — 92611 MOTION FLUOROSCOPY/SWALLOW: CPT

## 2023-01-01 RX ORDER — DEXTROSE MONOHYDRATE 25 G/50ML
INJECTION, SOLUTION INTRAVENOUS
Status: COMPLETED
Start: 2023-01-01 | End: 2023-01-01

## 2023-01-01 RX ORDER — POTASSIUM CHLORIDE 20MEQ/15ML
40 LIQUID (ML) ORAL
Status: COMPLETED | OUTPATIENT
Start: 2023-01-01 | End: 2023-01-01

## 2023-01-01 RX ORDER — MORPHINE SULFATE 15 MG/1
7.5 TABLET ORAL EVERY 6 HOURS PRN
Status: DISCONTINUED | OUTPATIENT
Start: 2023-01-01 | End: 2023-01-01

## 2023-01-01 RX ORDER — FENTANYL CITRATE-0.9 % NACL/PF 10 MCG/ML
50 PLASTIC BAG, INJECTION (ML) INTRAVENOUS CONTINUOUS
Status: DISCONTINUED | OUTPATIENT
Start: 2023-01-01 | End: 2023-01-01 | Stop reason: HOSPADM

## 2023-01-01 RX ORDER — FENTANYL CITRATE 50 UG/ML
25 INJECTION, SOLUTION INTRAMUSCULAR; INTRAVENOUS ONCE
Status: COMPLETED | OUTPATIENT
Start: 2023-01-01 | End: 2023-01-01

## 2023-01-01 RX ORDER — ALBUMIN (HUMAN) 12.5 G/50ML
12.5 SOLUTION INTRAVENOUS ONCE
Status: COMPLETED | OUTPATIENT
Start: 2023-01-01 | End: 2023-01-01

## 2023-01-01 RX ORDER — KETOROLAC TROMETHAMINE 30 MG/ML
15 INJECTION, SOLUTION INTRAMUSCULAR; INTRAVENOUS ONCE
Status: COMPLETED | OUTPATIENT
Start: 2023-01-01 | End: 2023-01-01

## 2023-01-01 RX ORDER — DIGOXIN 0.25 MG/ML
62.5 INJECTION INTRAMUSCULAR; INTRAVENOUS DAILY
Status: DISCONTINUED | OUTPATIENT
Start: 2023-01-01 | End: 2023-01-01

## 2023-01-01 RX ORDER — ALBUMIN, HUMAN INJ 5% 5 %
25 SOLUTION INTRAVENOUS EVERY 6 HOURS
Status: DISCONTINUED | OUTPATIENT
Start: 2023-01-01 | End: 2023-01-01

## 2023-01-01 RX ORDER — CEFAZOLIN SODIUM 2 G/50ML
2000 SOLUTION INTRAVENOUS ONCE
Status: COMPLETED | OUTPATIENT
Start: 2023-01-01 | End: 2023-01-01

## 2023-01-01 RX ORDER — CALCIUM GLUCONATE 20 MG/ML
2 INJECTION, SOLUTION INTRAVENOUS ONCE
Status: COMPLETED | OUTPATIENT
Start: 2023-01-01 | End: 2023-01-01

## 2023-01-01 RX ORDER — OXYCODONE HYDROCHLORIDE 5 MG/1
5 TABLET ORAL EVERY 8 HOURS
Status: DISCONTINUED | OUTPATIENT
Start: 2023-01-01 | End: 2023-01-01

## 2023-01-01 RX ORDER — CHLORHEXIDINE GLUCONATE ORAL RINSE 1.2 MG/ML
15 SOLUTION DENTAL EVERY 12 HOURS SCHEDULED
Status: DISCONTINUED | OUTPATIENT
Start: 2023-01-01 | End: 2023-01-01 | Stop reason: HOSPADM

## 2023-01-01 RX ORDER — DEXTROSE MONOHYDRATE 100 MG/ML
20 INJECTION, SOLUTION INTRAVENOUS CONTINUOUS
Status: DISCONTINUED | OUTPATIENT
Start: 2023-01-01 | End: 2023-01-01

## 2023-01-01 RX ORDER — ALBUMIN (HUMAN) 12.5 G/50ML
50 SOLUTION INTRAVENOUS ONCE
Status: COMPLETED | OUTPATIENT
Start: 2023-01-01 | End: 2023-01-01

## 2023-01-01 RX ORDER — FUROSEMIDE 10 MG/ML
20 INJECTION INTRAMUSCULAR; INTRAVENOUS ONCE
Status: COMPLETED | OUTPATIENT
Start: 2023-01-01 | End: 2023-01-01

## 2023-01-01 RX ORDER — FUROSEMIDE 10 MG/ML
40 INJECTION INTRAMUSCULAR; INTRAVENOUS DAILY
Status: DISCONTINUED | OUTPATIENT
Start: 2023-01-01 | End: 2023-01-01

## 2023-01-01 RX ORDER — LORAZEPAM 2 MG/ML
0.5 INJECTION INTRAMUSCULAR ONCE
Status: COMPLETED | OUTPATIENT
Start: 2023-01-01 | End: 2023-01-01

## 2023-01-01 RX ORDER — MAGNESIUM SULFATE HEPTAHYDRATE 40 MG/ML
2 INJECTION, SOLUTION INTRAVENOUS ONCE
Status: COMPLETED | OUTPATIENT
Start: 2023-01-01 | End: 2023-01-01

## 2023-01-01 RX ORDER — MAGNESIUM HYDROXIDE/ALUMINUM HYDROXICE/SIMETHICONE 120; 1200; 1200 MG/30ML; MG/30ML; MG/30ML
30 SUSPENSION ORAL EVERY 4 HOURS PRN
Status: DISCONTINUED | OUTPATIENT
Start: 2023-01-01 | End: 2023-01-01

## 2023-01-01 RX ORDER — FENTANYL CITRATE 50 UG/ML
50 INJECTION, SOLUTION INTRAMUSCULAR; INTRAVENOUS ONCE
Status: COMPLETED | OUTPATIENT
Start: 2023-01-01 | End: 2023-01-01

## 2023-01-01 RX ORDER — HEPARIN SODIUM 5000 [USP'U]/ML
5000 INJECTION, SOLUTION INTRAVENOUS; SUBCUTANEOUS EVERY 8 HOURS SCHEDULED
Status: DISCONTINUED | OUTPATIENT
Start: 2023-01-01 | End: 2023-01-01 | Stop reason: HOSPADM

## 2023-01-01 RX ORDER — IPRATROPIUM BROMIDE AND ALBUTEROL SULFATE 2.5; .5 MG/3ML; MG/3ML
3 SOLUTION RESPIRATORY (INHALATION)
Status: DISCONTINUED | OUTPATIENT
Start: 2023-01-01 | End: 2023-01-01

## 2023-01-01 RX ORDER — DEXTROSE MONOHYDRATE 25 G/50ML
25 INJECTION, SOLUTION INTRAVENOUS ONCE
Status: COMPLETED | OUTPATIENT
Start: 2023-01-01 | End: 2023-01-01

## 2023-01-01 RX ORDER — DEXTROSE AND SODIUM CHLORIDE 5; .45 G/100ML; G/100ML
50 INJECTION, SOLUTION INTRAVENOUS CONTINUOUS
Status: DISCONTINUED | OUTPATIENT
Start: 2023-01-01 | End: 2023-01-01

## 2023-01-01 RX ORDER — POTASSIUM CHLORIDE 20MEQ/15ML
40 LIQUID (ML) ORAL ONCE
Status: DISCONTINUED | OUTPATIENT
Start: 2023-01-01 | End: 2023-01-01

## 2023-01-01 RX ORDER — ONDANSETRON 2 MG/ML
4 INJECTION INTRAMUSCULAR; INTRAVENOUS EVERY 6 HOURS PRN
Status: DISCONTINUED | OUTPATIENT
Start: 2023-01-01 | End: 2023-01-01

## 2023-01-01 RX ORDER — FUROSEMIDE 10 MG/ML
10 SYRINGE (ML) INJECTION CONTINUOUS
Status: DISCONTINUED | OUTPATIENT
Start: 2023-01-01 | End: 2023-01-01

## 2023-01-01 RX ORDER — POTASSIUM CHLORIDE 29.8 MG/ML
40 INJECTION INTRAVENOUS ONCE
Status: DISCONTINUED | OUTPATIENT
Start: 2023-01-01 | End: 2023-01-01

## 2023-01-01 RX ORDER — HYDROMORPHONE HCL IN WATER/PF 6 MG/30 ML
0.2 PATIENT CONTROLLED ANALGESIA SYRINGE INTRAVENOUS EVERY 4 HOURS PRN
Status: DISCONTINUED | OUTPATIENT
Start: 2023-01-01 | End: 2023-01-01

## 2023-01-01 RX ORDER — INSULIN LISPRO 100 [IU]/ML
1-5 INJECTION, SOLUTION INTRAVENOUS; SUBCUTANEOUS EVERY 6 HOURS SCHEDULED
Status: DISCONTINUED | OUTPATIENT
Start: 2023-01-01 | End: 2023-01-01 | Stop reason: HOSPADM

## 2023-01-01 RX ORDER — INSULIN LISPRO 100 [IU]/ML
4-20 INJECTION, SOLUTION INTRAVENOUS; SUBCUTANEOUS EVERY 6 HOURS SCHEDULED
Status: DISCONTINUED | OUTPATIENT
Start: 2023-01-01 | End: 2023-01-01

## 2023-01-01 RX ORDER — VANCOMYCIN HYDROCHLORIDE 500 MG/100ML
10 INJECTION, SOLUTION INTRAVENOUS DAILY PRN
Status: DISCONTINUED | OUTPATIENT
Start: 2023-09-09 | End: 2023-01-01 | Stop reason: HOSPADM

## 2023-01-01 RX ORDER — POTASSIUM CHLORIDE 14.9 MG/ML
20 INJECTION INTRAVENOUS
Status: COMPLETED | OUTPATIENT
Start: 2023-01-01 | End: 2023-01-01

## 2023-01-01 RX ORDER — HYDROXYZINE HYDROCHLORIDE 25 MG/1
25 TABLET, FILM COATED ORAL ONCE
Status: COMPLETED | OUTPATIENT
Start: 2023-01-01 | End: 2023-01-01

## 2023-01-01 RX ORDER — DEXTROSE 50 % IN WATER 50 %
SYRINGE (ML) INTRAVENOUS CODE/TRAUMA/SEDATION MEDICATION
Status: COMPLETED | OUTPATIENT
Start: 2023-01-01 | End: 2023-01-01

## 2023-01-01 RX ORDER — POLYETHYLENE GLYCOL 3350 17 G/17G
17 POWDER, FOR SOLUTION ORAL DAILY
Status: DISCONTINUED | OUTPATIENT
Start: 2023-01-01 | End: 2023-01-01 | Stop reason: HOSPADM

## 2023-01-01 RX ORDER — CALCIUM CHLORIDE 100 MG/ML
SYRINGE (ML) INTRAVENOUS CODE/TRAUMA/SEDATION MEDICATION
Status: COMPLETED | OUTPATIENT
Start: 2023-01-01 | End: 2023-01-01

## 2023-01-01 RX ORDER — CEFAZOLIN SODIUM 2 G/50ML
2000 SOLUTION INTRAVENOUS ONCE
Status: DISCONTINUED | OUTPATIENT
Start: 2023-01-01 | End: 2023-01-01

## 2023-01-01 RX ORDER — MAGNESIUM SULFATE HEPTAHYDRATE 40 MG/ML
2 INJECTION, SOLUTION INTRAVENOUS ONCE
Status: DISCONTINUED | OUTPATIENT
Start: 2023-01-01 | End: 2023-01-01

## 2023-01-01 RX ORDER — HYDROXYCHLOROQUINE SULFATE 200 MG/1
200 TABLET, FILM COATED ORAL 2 TIMES DAILY WITH MEALS
Status: DISCONTINUED | OUTPATIENT
Start: 2023-01-01 | End: 2023-01-01

## 2023-01-01 RX ORDER — POTASSIUM CHLORIDE 20MEQ/15ML
40 LIQUID (ML) ORAL ONCE
Status: COMPLETED | OUTPATIENT
Start: 2023-01-01 | End: 2023-01-01

## 2023-01-01 RX ORDER — LIDOCAINE 50 MG/G
1 PATCH TOPICAL DAILY
Status: DISCONTINUED | OUTPATIENT
Start: 2023-01-01 | End: 2023-01-01 | Stop reason: HOSPADM

## 2023-01-01 RX ORDER — DEXTROSE AND SODIUM CHLORIDE 5; .9 G/100ML; G/100ML
50 INJECTION, SOLUTION INTRAVENOUS CONTINUOUS
Status: DISCONTINUED | OUTPATIENT
Start: 2023-01-01 | End: 2023-01-01

## 2023-01-01 RX ORDER — SODIUM CHLORIDE, SODIUM GLUCONATE, SODIUM ACETATE, POTASSIUM CHLORIDE, MAGNESIUM CHLORIDE, SODIUM PHOSPHATE, DIBASIC, AND POTASSIUM PHOSPHATE .53; .5; .37; .037; .03; .012; .00082 G/100ML; G/100ML; G/100ML; G/100ML; G/100ML; G/100ML; G/100ML
50 INJECTION, SOLUTION INTRAVENOUS CONTINUOUS
Status: DISPENSED | OUTPATIENT
Start: 2023-01-01 | End: 2023-01-01

## 2023-01-01 RX ORDER — LORAZEPAM 2 MG/ML
1 INJECTION INTRAMUSCULAR ONCE
Status: DISCONTINUED | OUTPATIENT
Start: 2023-01-01 | End: 2023-01-01

## 2023-01-01 RX ORDER — DEXTROSE MONOHYDRATE 25 G/50ML
12.5 INJECTION, SOLUTION INTRAVENOUS ONCE
Status: COMPLETED | OUTPATIENT
Start: 2023-01-01 | End: 2023-01-01

## 2023-01-01 RX ORDER — SODIUM CHLORIDE, SODIUM GLUCONATE, SODIUM ACETATE, POTASSIUM CHLORIDE, MAGNESIUM CHLORIDE, SODIUM PHOSPHATE, DIBASIC, AND POTASSIUM PHOSPHATE .53; .5; .37; .037; .03; .012; .00082 G/100ML; G/100ML; G/100ML; G/100ML; G/100ML; G/100ML; G/100ML
500 INJECTION, SOLUTION INTRAVENOUS ONCE
Status: COMPLETED | OUTPATIENT
Start: 2023-01-01 | End: 2023-01-01

## 2023-01-01 RX ORDER — GABAPENTIN 250 MG/5ML
100 SOLUTION ORAL
Status: DISCONTINUED | OUTPATIENT
Start: 2023-01-01 | End: 2023-01-01 | Stop reason: HOSPADM

## 2023-01-01 RX ORDER — DEXMEDETOMIDINE HYDROCHLORIDE 4 UG/ML
.1-.7 INJECTION, SOLUTION INTRAVENOUS
Status: DISCONTINUED | OUTPATIENT
Start: 2023-01-01 | End: 2023-01-01 | Stop reason: HOSPADM

## 2023-01-01 RX ORDER — CEFEPIME HYDROCHLORIDE 2 G/50ML
2000 INJECTION, SOLUTION INTRAVENOUS EVERY 12 HOURS
Status: DISCONTINUED | OUTPATIENT
Start: 2023-01-01 | End: 2023-01-01 | Stop reason: HOSPADM

## 2023-01-01 RX ORDER — DEXTROSE MONOHYDRATE 25 G/50ML
50 INJECTION, SOLUTION INTRAVENOUS ONCE
Status: COMPLETED | OUTPATIENT
Start: 2023-01-01 | End: 2023-01-01

## 2023-01-01 RX ORDER — FENTANYL CITRATE 50 UG/ML
50 INJECTION, SOLUTION INTRAMUSCULAR; INTRAVENOUS ONCE
Status: DISCONTINUED | OUTPATIENT
Start: 2023-01-01 | End: 2023-01-01

## 2023-01-01 RX ORDER — DIGOXIN 0.25 MG/ML
500 INJECTION INTRAMUSCULAR; INTRAVENOUS ONCE
Status: COMPLETED | OUTPATIENT
Start: 2023-01-01 | End: 2023-01-01

## 2023-01-01 RX ORDER — DIPHENHYDRAMINE HYDROCHLORIDE 50 MG/ML
25 INJECTION INTRAMUSCULAR; INTRAVENOUS ONCE
Status: COMPLETED | OUTPATIENT
Start: 2023-01-01 | End: 2023-01-01

## 2023-01-01 RX ORDER — POTASSIUM CHLORIDE 20 MEQ/1
40 TABLET, EXTENDED RELEASE ORAL EVERY 4 HOURS
Status: DISCONTINUED | OUTPATIENT
Start: 2023-01-01 | End: 2023-01-01

## 2023-01-01 RX ORDER — EPINEPHRINE IN SOD CHLOR,ISO 1 MG/10 ML
SYRINGE (ML) INTRAVENOUS CODE/TRAUMA/SEDATION MEDICATION
Status: COMPLETED | OUTPATIENT
Start: 2023-01-01 | End: 2023-01-01

## 2023-01-01 RX ORDER — FUROSEMIDE 10 MG/ML
40 INJECTION INTRAMUSCULAR; INTRAVENOUS
Status: DISCONTINUED | OUTPATIENT
Start: 2023-01-01 | End: 2023-01-01

## 2023-01-01 RX ORDER — GABAPENTIN 250 MG/5ML
100 SOLUTION ORAL 2 TIMES DAILY
Status: DISCONTINUED | OUTPATIENT
Start: 2023-01-01 | End: 2023-01-01

## 2023-01-01 RX ORDER — DIGOXIN 0.25 MG/ML
250 INJECTION INTRAMUSCULAR; INTRAVENOUS ONCE
Status: COMPLETED | OUTPATIENT
Start: 2023-01-01 | End: 2023-01-01

## 2023-01-01 RX ORDER — CEFTRIAXONE 1 G/50ML
1000 INJECTION, SOLUTION INTRAVENOUS EVERY 24 HOURS
Status: DISCONTINUED | OUTPATIENT
Start: 2023-01-01 | End: 2023-01-01

## 2023-01-01 RX ORDER — PANTOPRAZOLE SODIUM 40 MG/1
40 TABLET, DELAYED RELEASE ORAL
Status: DISCONTINUED | OUTPATIENT
Start: 2023-01-01 | End: 2023-01-01

## 2023-01-01 RX ORDER — MORPHINE SULFATE 4 MG/ML
4 INJECTION, SOLUTION INTRAMUSCULAR; INTRAVENOUS EVERY 4 HOURS PRN
Status: DISCONTINUED | OUTPATIENT
Start: 2023-01-01 | End: 2023-01-01

## 2023-01-01 RX ORDER — DULOXETIN HYDROCHLORIDE 60 MG/1
60 CAPSULE, DELAYED RELEASE ORAL 2 TIMES DAILY
Status: DISCONTINUED | OUTPATIENT
Start: 2023-01-01 | End: 2023-01-01 | Stop reason: HOSPADM

## 2023-01-01 RX ORDER — CLINDAMYCIN PHOSPHATE 900 MG/50ML
900 INJECTION INTRAVENOUS ONCE
Status: DISCONTINUED | OUTPATIENT
Start: 2023-01-01 | End: 2023-01-01 | Stop reason: RX

## 2023-01-01 RX ORDER — METHYLPREDNISOLONE SODIUM SUCCINATE 40 MG/ML
40 INJECTION, POWDER, LYOPHILIZED, FOR SOLUTION INTRAMUSCULAR; INTRAVENOUS EVERY 6 HOURS SCHEDULED
Status: DISCONTINUED | OUTPATIENT
Start: 2023-01-01 | End: 2023-01-01

## 2023-01-01 RX ORDER — MORPHINE SULFATE 4 MG/ML
4 INJECTION, SOLUTION INTRAMUSCULAR; INTRAVENOUS EVERY 6 HOURS PRN
Status: DISCONTINUED | OUTPATIENT
Start: 2023-01-01 | End: 2023-01-01

## 2023-01-01 RX ORDER — LAMOTRIGINE 100 MG/1
150 TABLET ORAL
Status: DISCONTINUED | OUTPATIENT
Start: 2023-01-01 | End: 2023-01-01

## 2023-01-01 RX ORDER — OXYCODONE HCL 5 MG/5 ML
5 SOLUTION, ORAL ORAL EVERY 8 HOURS
Status: DISCONTINUED | OUTPATIENT
Start: 2023-01-01 | End: 2023-01-01

## 2023-01-01 RX ORDER — ACETAMINOPHEN 160 MG/5ML
975 SUSPENSION ORAL EVERY 8 HOURS
Status: DISCONTINUED | OUTPATIENT
Start: 2023-01-01 | End: 2023-01-01

## 2023-01-01 RX ORDER — LEVOFLOXACIN 5 MG/ML
750 INJECTION, SOLUTION INTRAVENOUS ONCE
Status: COMPLETED | OUTPATIENT
Start: 2023-01-01 | End: 2023-01-01

## 2023-01-01 RX ORDER — MAGNESIUM SULFATE HEPTAHYDRATE 40 MG/ML
4 INJECTION, SOLUTION INTRAVENOUS ONCE
Status: COMPLETED | OUTPATIENT
Start: 2023-01-01 | End: 2023-01-01

## 2023-01-01 RX ORDER — FENTANYL CITRATE 50 UG/ML
INJECTION, SOLUTION INTRAMUSCULAR; INTRAVENOUS
Status: COMPLETED
Start: 2023-01-01 | End: 2023-01-01

## 2023-01-01 RX ORDER — SODIUM CHLORIDE FOR INHALATION 0.9 %
3 VIAL, NEBULIZER (ML) INHALATION ONCE
Status: DISCONTINUED | OUTPATIENT
Start: 2023-01-01 | End: 2023-01-01

## 2023-01-01 RX ORDER — DIGOXIN 0.25 MG/ML
62.5 INJECTION INTRAMUSCULAR; INTRAVENOUS DAILY
Status: DISCONTINUED | OUTPATIENT
Start: 2023-01-01 | End: 2023-01-01 | Stop reason: HOSPADM

## 2023-01-01 RX ORDER — ALBUTEROL SULFATE 90 UG/1
2 AEROSOL, METERED RESPIRATORY (INHALATION) EVERY 6 HOURS PRN
Status: DISCONTINUED | OUTPATIENT
Start: 2023-01-01 | End: 2023-01-01

## 2023-01-01 RX ORDER — FUROSEMIDE 10 MG/ML
40 INJECTION INTRAMUSCULAR; INTRAVENOUS ONCE
Status: COMPLETED | OUTPATIENT
Start: 2023-01-01 | End: 2023-01-01

## 2023-01-01 RX ORDER — LEVOFLOXACIN 5 MG/ML
750 INJECTION, SOLUTION INTRAVENOUS EVERY 24 HOURS
Status: DISCONTINUED | OUTPATIENT
Start: 2023-01-01 | End: 2023-01-01

## 2023-01-01 RX ORDER — CINACALCET 30 MG/1
30 TABLET, FILM COATED ORAL
Status: DISCONTINUED | OUTPATIENT
Start: 2023-01-01 | End: 2023-01-01

## 2023-01-01 RX ORDER — SODIUM CHLORIDE FOR INHALATION 0.9 %
3 VIAL, NEBULIZER (ML) INHALATION ONCE
Status: COMPLETED | OUTPATIENT
Start: 2023-01-01 | End: 2023-01-01

## 2023-01-01 RX ORDER — BISACODYL 10 MG
10 SUPPOSITORY, RECTAL RECTAL DAILY
Status: DISCONTINUED | OUTPATIENT
Start: 2023-01-01 | End: 2023-01-01 | Stop reason: HOSPADM

## 2023-01-01 RX ORDER — METOPROLOL SUCCINATE 25 MG/1
25 TABLET, EXTENDED RELEASE ORAL DAILY
Status: DISCONTINUED | OUTPATIENT
Start: 2023-01-01 | End: 2023-01-01

## 2023-01-01 RX ORDER — PANTOPRAZOLE SODIUM 40 MG/10ML
40 INJECTION, POWDER, LYOPHILIZED, FOR SOLUTION INTRAVENOUS
Status: DISCONTINUED | OUTPATIENT
Start: 2023-01-01 | End: 2023-01-01

## 2023-01-01 RX ORDER — DEXTROSE MONOHYDRATE 25 G/50ML
INJECTION, SOLUTION INTRAVENOUS
Status: DISPENSED
Start: 2023-01-01 | End: 2023-01-01

## 2023-01-01 RX ORDER — MAGNESIUM SULFATE 500 MG/ML
VIAL (ML) INJECTION CODE/TRAUMA/SEDATION MEDICATION
Status: COMPLETED | OUTPATIENT
Start: 2023-01-01 | End: 2023-01-01

## 2023-01-01 RX ORDER — FUROSEMIDE 10 MG/ML
20 INJECTION INTRAMUSCULAR; INTRAVENOUS ONCE
Status: DISCONTINUED | OUTPATIENT
Start: 2023-01-01 | End: 2023-01-01

## 2023-01-01 RX ORDER — MILRINONE LACTATE 0.2 MG/ML
0.13 INJECTION, SOLUTION INTRAVENOUS CONTINUOUS
Status: DISCONTINUED | OUTPATIENT
Start: 2023-01-01 | End: 2023-01-01

## 2023-01-01 RX ORDER — ACETYLCYSTEINE 200 MG/ML
3 SOLUTION ORAL; RESPIRATORY (INHALATION)
Status: DISCONTINUED | OUTPATIENT
Start: 2023-01-01 | End: 2023-01-01

## 2023-01-01 RX ORDER — FENTANYL CITRATE 50 UG/ML
50 INJECTION, SOLUTION INTRAMUSCULAR; INTRAVENOUS EVERY 2 HOUR PRN
Status: DISCONTINUED | OUTPATIENT
Start: 2023-01-01 | End: 2023-01-01

## 2023-01-01 RX ORDER — POTASSIUM CHLORIDE 14.9 MG/ML
20 INJECTION INTRAVENOUS ONCE
Status: COMPLETED | OUTPATIENT
Start: 2023-01-01 | End: 2023-01-01

## 2023-01-01 RX ORDER — MAGNESIUM SULFATE 1 G/100ML
1 INJECTION INTRAVENOUS ONCE
Status: COMPLETED | OUTPATIENT
Start: 2023-01-01 | End: 2023-01-01

## 2023-01-01 RX ORDER — DULOXETIN HYDROCHLORIDE 60 MG/1
60 CAPSULE, DELAYED RELEASE ORAL 2 TIMES DAILY
Status: DISCONTINUED | OUTPATIENT
Start: 2023-01-01 | End: 2023-01-01

## 2023-01-01 RX ORDER — DEXTROSE, SODIUM CHLORIDE, SODIUM LACTATE, POTASSIUM CHLORIDE, AND CALCIUM CHLORIDE 5; .6; .31; .03; .02 G/100ML; G/100ML; G/100ML; G/100ML; G/100ML
50 INJECTION, SOLUTION INTRAVENOUS CONTINUOUS
Status: DISCONTINUED | OUTPATIENT
Start: 2023-01-01 | End: 2023-01-01

## 2023-01-01 RX ORDER — LORAZEPAM 2 MG/ML
0.5 INJECTION INTRAMUSCULAR ONCE
Status: DISCONTINUED | OUTPATIENT
Start: 2023-01-01 | End: 2023-01-01

## 2023-01-01 RX ORDER — ACETAMINOPHEN 325 MG/1
650 TABLET ORAL EVERY 6 HOURS SCHEDULED
Status: DISCONTINUED | OUTPATIENT
Start: 2023-01-01 | End: 2023-01-01

## 2023-01-01 RX ORDER — BUPROPION HYDROCHLORIDE 100 MG/1
50 TABLET ORAL 2 TIMES DAILY
Status: DISCONTINUED | OUTPATIENT
Start: 2023-01-01 | End: 2023-01-01

## 2023-01-01 RX ORDER — ONDANSETRON 2 MG/ML
4 INJECTION INTRAMUSCULAR; INTRAVENOUS ONCE
Status: COMPLETED | OUTPATIENT
Start: 2023-01-01 | End: 2023-01-01

## 2023-01-01 RX ORDER — GABAPENTIN 250 MG/5ML
300 SOLUTION ORAL 2 TIMES DAILY
Status: DISCONTINUED | OUTPATIENT
Start: 2023-01-01 | End: 2023-01-01

## 2023-01-01 RX ORDER — DEXTROSE MONOHYDRATE 50 MG/ML
75 INJECTION, SOLUTION INTRAVENOUS CONTINUOUS
Status: DISCONTINUED | OUTPATIENT
Start: 2023-01-01 | End: 2023-01-01

## 2023-01-01 RX ORDER — CHLORHEXIDINE GLUCONATE ORAL RINSE 1.2 MG/ML
15 SOLUTION DENTAL EVERY 12 HOURS SCHEDULED
Status: DISCONTINUED | OUTPATIENT
Start: 2023-01-01 | End: 2023-01-01

## 2023-01-01 RX ORDER — LAMOTRIGINE 25 MG/1
50 TABLET ORAL
Status: DISCONTINUED | OUTPATIENT
Start: 2023-01-01 | End: 2023-01-01 | Stop reason: HOSPADM

## 2023-01-01 RX ORDER — DOCUSATE SODIUM 100 MG/1
100 CAPSULE, LIQUID FILLED ORAL 2 TIMES DAILY
Status: DISCONTINUED | OUTPATIENT
Start: 2023-01-01 | End: 2023-01-01

## 2023-01-01 RX ORDER — LAMOTRIGINE 25 MG/1
50 TABLET ORAL
Status: DISCONTINUED | OUTPATIENT
Start: 2023-01-01 | End: 2023-01-01

## 2023-01-01 RX ORDER — ACETAMINOPHEN 160 MG/5ML
650 SUSPENSION ORAL EVERY 4 HOURS PRN
Status: DISCONTINUED | OUTPATIENT
Start: 2023-01-01 | End: 2023-01-01

## 2023-01-01 RX ORDER — POTASSIUM CHLORIDE 29.8 MG/ML
40 INJECTION INTRAVENOUS ONCE
Status: COMPLETED | OUTPATIENT
Start: 2023-01-01 | End: 2023-01-01

## 2023-01-01 RX ORDER — MIDODRINE HYDROCHLORIDE 5 MG/1
5 TABLET ORAL EVERY 8 HOURS
Status: DISCONTINUED | OUTPATIENT
Start: 2023-01-01 | End: 2023-01-01 | Stop reason: HOSPADM

## 2023-01-01 RX ORDER — FENTANYL CITRATE-0.9 % NACL/PF 10 MCG/ML
25 PLASTIC BAG, INJECTION (ML) INTRAVENOUS CONTINUOUS
Status: DISCONTINUED | OUTPATIENT
Start: 2023-01-01 | End: 2023-01-01

## 2023-01-01 RX ORDER — OXYCODONE HCL 5 MG/5 ML
2.5 SOLUTION, ORAL ORAL EVERY 8 HOURS
Status: DISCONTINUED | OUTPATIENT
Start: 2023-01-01 | End: 2023-01-01 | Stop reason: HOSPADM

## 2023-01-01 RX ORDER — MORPHINE SULFATE 15 MG/1
7.5 TABLET ORAL 2 TIMES DAILY PRN
Status: DISCONTINUED | OUTPATIENT
Start: 2023-01-01 | End: 2023-01-01

## 2023-01-01 RX ORDER — ALBUMIN (HUMAN) 12.5 G/50ML
25 SOLUTION INTRAVENOUS EVERY 6 HOURS SCHEDULED
Status: DISCONTINUED | OUTPATIENT
Start: 2023-01-01 | End: 2023-01-01

## 2023-01-01 RX ORDER — HYDROMORPHONE HCL IN WATER/PF 6 MG/30 ML
0.2 PATIENT CONTROLLED ANALGESIA SYRINGE INTRAVENOUS EVERY 6 HOURS PRN
Status: DISCONTINUED | OUTPATIENT
Start: 2023-01-01 | End: 2023-01-01

## 2023-01-01 RX ORDER — EPINEPHRINE IN SOD CHLOR,ISO 1 MG/10 ML
SYRINGE (ML) INTRAVENOUS
Status: COMPLETED
Start: 2023-01-01 | End: 2023-01-01

## 2023-01-01 RX ORDER — FUROSEMIDE 10 MG/ML
40 SYRINGE (ML) INJECTION CONTINUOUS
Status: DISCONTINUED | OUTPATIENT
Start: 2023-01-01 | End: 2023-01-01

## 2023-01-01 RX ORDER — FENTANYL CITRATE 50 UG/ML
25 INJECTION, SOLUTION INTRAMUSCULAR; INTRAVENOUS
Status: DISCONTINUED | OUTPATIENT
Start: 2023-01-01 | End: 2023-01-01

## 2023-01-01 RX ORDER — SENNOSIDES 8.6 MG
8.6 TABLET ORAL 2 TIMES DAILY
Status: DISCONTINUED | OUTPATIENT
Start: 2023-01-01 | End: 2023-01-01

## 2023-01-01 RX ORDER — ALBUTEROL SULFATE 2.5 MG/3ML
2.5 SOLUTION RESPIRATORY (INHALATION)
Status: DISCONTINUED | OUTPATIENT
Start: 2023-01-01 | End: 2023-01-01

## 2023-01-01 RX ADMIN — LAMOTRIGINE 50 MG: 100 TABLET ORAL at 23:09

## 2023-01-01 RX ADMIN — NOREPINEPHRINE BITARTRATE 5 MCG/MIN: 1 INJECTION, SOLUTION, CONCENTRATE INTRAVENOUS at 10:09

## 2023-01-01 RX ADMIN — Medication 50 MCG/HR: at 15:08

## 2023-01-01 RX ADMIN — FENTANYL CITRATE 25 MCG: 50 INJECTION INTRAMUSCULAR; INTRAVENOUS at 19:39

## 2023-01-01 RX ADMIN — BUPROPION HYDROCHLORIDE 50 MG: 100 TABLET, FILM COATED ORAL at 17:24

## 2023-01-01 RX ADMIN — BUPROPION HYDROCHLORIDE 50 MG: 100 TABLET, FILM COATED ORAL at 09:44

## 2023-01-01 RX ADMIN — GLYCERIN, HYPROMELLOSE, POLYETHYLENE GLYCOL 1 DROP: .2; .2; 1 LIQUID OPHTHALMIC at 16:15

## 2023-01-01 RX ADMIN — DULOXETINE HYDROCHLORIDE 60 MG: 60 CAPSULE, DELAYED RELEASE ORAL at 17:02

## 2023-01-01 RX ADMIN — DULOXETINE 60 MG: 60 CAPSULE, DELAYED RELEASE ORAL at 08:01

## 2023-01-01 RX ADMIN — METHYLPREDNISOLONE SODIUM SUCCINATE 40 MG: 40 INJECTION, POWDER, FOR SOLUTION INTRAMUSCULAR; INTRAVENOUS at 11:13

## 2023-01-01 RX ADMIN — POTASSIUM CHLORIDE 40 MEQ: 29.8 INJECTION, SOLUTION INTRAVENOUS at 19:42

## 2023-01-01 RX ADMIN — DULOXETINE 60 MG: 60 CAPSULE, DELAYED RELEASE ORAL at 08:39

## 2023-01-01 RX ADMIN — ALBUTEROL SULFATE 2 PUFF: 90 AEROSOL, METERED RESPIRATORY (INHALATION) at 16:11

## 2023-01-01 RX ADMIN — HYDROMORPHONE HYDROCHLORIDE 0.2 MG: 0.2 INJECTION, SOLUTION INTRAMUSCULAR; INTRAVENOUS; SUBCUTANEOUS at 11:10

## 2023-01-01 RX ADMIN — POTASSIUM CHLORIDE 40 MEQ: 1.5 SOLUTION ORAL at 10:30

## 2023-01-01 RX ADMIN — Medication 20 MG: at 14:11

## 2023-01-01 RX ADMIN — Medication 6 MCG/MIN: at 02:29

## 2023-01-01 RX ADMIN — CEFTRIAXONE 1000 MG: 1 INJECTION, SOLUTION INTRAVENOUS at 10:04

## 2023-01-01 RX ADMIN — OXYCODONE HYDROCHLORIDE 2.5 MG: 5 SOLUTION ORAL at 09:00

## 2023-01-01 RX ADMIN — Medication 20 MG: at 09:22

## 2023-01-01 RX ADMIN — MAGNESIUM SULFATE 2 G: 2 INJECTION INTRAVENOUS at 07:56

## 2023-01-01 RX ADMIN — BISACODYL 10 MG: 10 SUPPOSITORY RECTAL at 08:47

## 2023-01-01 RX ADMIN — POTASSIUM PHOSPHATE, MONOBASIC AND POTASSIUM PHOSPHATE, DIBASIC 12 MMOL: 224; 236 INJECTION, SOLUTION, CONCENTRATE INTRAVENOUS at 15:05

## 2023-01-01 RX ADMIN — LIDOCAINE 1 PATCH: 700 PATCH TOPICAL at 08:02

## 2023-01-01 RX ADMIN — APIXABAN 5 MG: 5 TABLET, FILM COATED ORAL at 17:32

## 2023-01-01 RX ADMIN — Medication 12.5 MG: at 20:25

## 2023-01-01 RX ADMIN — DULOXETINE HYDROCHLORIDE 60 MG: 60 CAPSULE, DELAYED RELEASE ORAL at 17:49

## 2023-01-01 RX ADMIN — Medication 40 MG: at 06:44

## 2023-01-01 RX ADMIN — IPRATROPIUM BROMIDE AND ALBUTEROL SULFATE 3 ML: 2.5; .5 SOLUTION RESPIRATORY (INHALATION) at 19:29

## 2023-01-01 RX ADMIN — NOREPINEPHRINE BITARTRATE 20 MCG/MIN: 1 INJECTION, SOLUTION, CONCENTRATE INTRAVENOUS at 15:10

## 2023-01-01 RX ADMIN — MAGNESIUM SULFATE 2 G: 2 INJECTION INTRAVENOUS at 12:03

## 2023-01-01 RX ADMIN — GABAPENTIN 100 MG: 250 SOLUTION ORAL at 21:13

## 2023-01-01 RX ADMIN — APIXABAN 5 MG: 5 TABLET, FILM COATED ORAL at 17:23

## 2023-01-01 RX ADMIN — KETOROLAC TROMETHAMINE 15 MG: 30 INJECTION, SOLUTION INTRAMUSCULAR; INTRAVENOUS at 05:26

## 2023-01-01 RX ADMIN — CHLORHEXIDINE GLUCONATE 15 ML: 1.2 RINSE ORAL at 20:56

## 2023-01-01 RX ADMIN — CINACALCET 30 MG: 30 TABLET, FILM COATED ORAL at 09:04

## 2023-01-01 RX ADMIN — IPRATROPIUM BROMIDE AND ALBUTEROL SULFATE 3 ML: 2.5; .5 SOLUTION RESPIRATORY (INHALATION) at 13:27

## 2023-01-01 RX ADMIN — POTASSIUM CHLORIDE 20 MEQ: 14.9 INJECTION, SOLUTION INTRAVENOUS at 07:16

## 2023-01-01 RX ADMIN — GLYCERIN, HYPROMELLOSE, POLYETHYLENE GLYCOL 1 DROP: .2; .2; 1 LIQUID OPHTHALMIC at 20:00

## 2023-01-01 RX ADMIN — MIDODRINE HYDROCHLORIDE 5 MG: 5 TABLET ORAL at 11:44

## 2023-01-01 RX ADMIN — LIDOCAINE 1 PATCH: 700 PATCH TOPICAL at 08:01

## 2023-01-01 RX ADMIN — CINACALCET 30 MG: 30 TABLET, FILM COATED ORAL at 08:34

## 2023-01-01 RX ADMIN — HYDROXYCHLOROQUINE SULFATE 200 MG: 200 TABLET, FILM COATED ORAL at 08:02

## 2023-01-01 RX ADMIN — NYSTATIN 500000 UNITS: 100000 SUSPENSION ORAL at 18:19

## 2023-01-01 RX ADMIN — METHYLPREDNISOLONE SODIUM SUCCINATE 40 MG: 40 INJECTION, POWDER, FOR SOLUTION INTRAMUSCULAR; INTRAVENOUS at 17:51

## 2023-01-01 RX ADMIN — ACETYLCYSTEINE 5860 MG: 200 INJECTION, SOLUTION INTRAVENOUS at 22:30

## 2023-01-01 RX ADMIN — Medication 13 MCG/MIN: at 14:28

## 2023-01-01 RX ADMIN — HYDROXYZINE HYDROCHLORIDE 25 MG: 25 TABLET, FILM COATED ORAL at 01:03

## 2023-01-01 RX ADMIN — PANTOPRAZOLE SODIUM 40 MG: 40 INJECTION, POWDER, FOR SOLUTION INTRAVENOUS at 09:48

## 2023-01-01 RX ADMIN — GABAPENTIN 100 MG: 250 SOLUTION ORAL at 21:04

## 2023-01-01 RX ADMIN — CHLORHEXIDINE GLUCONATE 15 ML: 1.2 RINSE ORAL at 08:22

## 2023-01-01 RX ADMIN — KETOROLAC TROMETHAMINE 15 MG: 30 INJECTION, SOLUTION INTRAMUSCULAR; INTRAVENOUS at 12:02

## 2023-01-01 RX ADMIN — DULOXETINE HYDROCHLORIDE 60 MG: 60 CAPSULE, DELAYED RELEASE ORAL at 09:21

## 2023-01-01 RX ADMIN — GLYCERIN, HYPROMELLOSE, POLYETHYLENE GLYCOL 1 DROP: .2; .2; 1 LIQUID OPHTHALMIC at 08:25

## 2023-01-01 RX ADMIN — ACETYLCYSTEINE 600 MG: 200 SOLUTION ORAL; RESPIRATORY (INHALATION) at 18:01

## 2023-01-01 RX ADMIN — Medication 12.5 MG: at 20:27

## 2023-01-01 RX ADMIN — OXYCODONE HYDROCHLORIDE 5 MG: 5 SOLUTION ORAL at 02:11

## 2023-01-01 RX ADMIN — DOCUSATE SODIUM 100 MG: 100 CAPSULE, LIQUID FILLED ORAL at 09:09

## 2023-01-01 RX ADMIN — CALCIUM GLUCONATE 2 G: 20 INJECTION, SOLUTION INTRAVENOUS at 14:37

## 2023-01-01 RX ADMIN — DULOXETINE 60 MG: 60 CAPSULE, DELAYED RELEASE ORAL at 18:10

## 2023-01-01 RX ADMIN — IPRATROPIUM BROMIDE AND ALBUTEROL SULFATE 3 ML: 2.5; .5 SOLUTION RESPIRATORY (INHALATION) at 05:33

## 2023-01-01 RX ADMIN — DULOXETINE HYDROCHLORIDE 60 MG: 60 CAPSULE, DELAYED RELEASE ORAL at 08:34

## 2023-01-01 RX ADMIN — HYDROXYCHLOROQUINE SULFATE 200 MG: 200 TABLET, FILM COATED ORAL at 08:50

## 2023-01-01 RX ADMIN — BISACODYL 10 MG: 10 SUPPOSITORY RECTAL at 10:14

## 2023-01-01 RX ADMIN — OXYCODONE HYDROCHLORIDE 5 MG: 5 SOLUTION ORAL at 18:35

## 2023-01-01 RX ADMIN — VASOPRESSIN 0.04 UNITS/MIN: 20 INJECTION INTRAVENOUS at 00:21

## 2023-01-01 RX ADMIN — OXYCODONE HYDROCHLORIDE 5 MG: 5 TABLET ORAL at 20:27

## 2023-01-01 RX ADMIN — DEXTROSE MONOHYDRATE 50 ML: 25 INJECTION, SOLUTION INTRAVENOUS at 15:13

## 2023-01-01 RX ADMIN — POTASSIUM CHLORIDE 40 MEQ: 1.5 SOLUTION ORAL at 06:06

## 2023-01-01 RX ADMIN — POLYETHYLENE GLYCOL 3350 17 G: 17 POWDER, FOR SOLUTION ORAL at 08:22

## 2023-01-01 RX ADMIN — POTASSIUM CHLORIDE 20 MEQ: 14.9 INJECTION, SOLUTION INTRAVENOUS at 05:54

## 2023-01-01 RX ADMIN — ACYCLOVIR SODIUM 550 MG: 50 INJECTION, SOLUTION INTRAVENOUS at 05:31

## 2023-01-01 RX ADMIN — MORPHINE SULFATE 7.5 MG: 15 TABLET ORAL at 08:37

## 2023-01-01 RX ADMIN — ALUMINUM HYDROXIDE, MAGNESIUM HYDROXIDE, AND DIMETHICONE 30 ML: 200; 20; 200 SUSPENSION ORAL at 16:17

## 2023-01-01 RX ADMIN — CINACALCET 30 MG: 30 TABLET, FILM COATED ORAL at 08:50

## 2023-01-01 RX ADMIN — DEXTROSE MONOHYDRATE 25 ML: 25 INJECTION, SOLUTION INTRAVENOUS at 14:34

## 2023-01-01 RX ADMIN — OXYCODONE HYDROCHLORIDE 5 MG: 5 SOLUTION ORAL at 18:40

## 2023-01-01 RX ADMIN — EPINEPHRINE 1 MG: 0.1 INJECTION INTRACARDIAC; INTRAVENOUS at 09:31

## 2023-01-01 RX ADMIN — MAGNESIUM SULFATE 2 G: 2 INJECTION INTRAVENOUS at 14:58

## 2023-01-01 RX ADMIN — MAGNESIUM SULFATE HEPTAHYDRATE 1 G: 500 INJECTION, SOLUTION INTRAMUSCULAR; INTRAVENOUS at 09:37

## 2023-01-01 RX ADMIN — DIBASIC SODIUM PHOSPHATE, MONOBASIC POTASSIUM PHOSPHATE AND MONOBASIC SODIUM PHOSPHATE 2 TABLET: 852; 155; 130 TABLET ORAL at 03:11

## 2023-01-01 RX ADMIN — BUPROPION HYDROCHLORIDE 50 MG: 100 TABLET, FILM COATED ORAL at 17:20

## 2023-01-01 RX ADMIN — MAGNESIUM SULFATE 2 G: 2 INJECTION INTRAVENOUS at 09:44

## 2023-01-01 RX ADMIN — LAMOTRIGINE 50 MG: 25 TABLET ORAL at 20:27

## 2023-01-01 RX ADMIN — ACETAMINOPHEN 325MG 650 MG: 325 TABLET ORAL at 17:20

## 2023-01-01 RX ADMIN — SENNOSIDES 8.6 MG: 8.6 TABLET, FILM COATED ORAL at 09:08

## 2023-01-01 RX ADMIN — ACETYLCYSTEINE 600 MG: 200 SOLUTION ORAL; RESPIRATORY (INHALATION) at 19:29

## 2023-01-01 RX ADMIN — LAMOTRIGINE 150 MG: 100 TABLET ORAL at 21:41

## 2023-01-01 RX ADMIN — CINACALCET 30 MG: 30 TABLET, FILM COATED ORAL at 08:24

## 2023-01-01 RX ADMIN — Medication 12.5 MG: at 10:04

## 2023-01-01 RX ADMIN — DULOXETINE 60 MG: 60 CAPSULE, DELAYED RELEASE ORAL at 17:23

## 2023-01-01 RX ADMIN — DULOXETINE 60 MG: 60 CAPSULE, DELAYED RELEASE ORAL at 17:20

## 2023-01-01 RX ADMIN — DEXTROSE AND SODIUM CHLORIDE 50 ML/HR: 5; .9 INJECTION, SOLUTION INTRAVENOUS at 21:14

## 2023-01-01 RX ADMIN — DEXMEDETOMIDINE HYDROCHLORIDE 0.2 MCG/KG/HR: 4 INJECTION, SOLUTION INTRAVENOUS at 13:51

## 2023-01-01 RX ADMIN — ACETAMINOPHEN 325MG 650 MG: 325 TABLET ORAL at 13:06

## 2023-01-01 RX ADMIN — CEFTRIAXONE 1000 MG: 1 INJECTION, SOLUTION INTRAVENOUS at 12:08

## 2023-01-01 RX ADMIN — OXYCODONE HYDROCHLORIDE 5 MG: 5 SOLUTION ORAL at 17:03

## 2023-01-01 RX ADMIN — DEXTROSE MONOHYDRATE 50 ML: 25 INJECTION, SOLUTION INTRAVENOUS at 12:36

## 2023-01-01 RX ADMIN — GABAPENTIN 300 MG: 250 SOLUTION ORAL at 18:36

## 2023-01-01 RX ADMIN — IPRATROPIUM BROMIDE AND ALBUTEROL SULFATE 3 ML: 2.5; .5 SOLUTION RESPIRATORY (INHALATION) at 20:40

## 2023-01-01 RX ADMIN — APIXABAN 5 MG: 5 TABLET, FILM COATED ORAL at 08:50

## 2023-01-01 RX ADMIN — DULOXETINE HYDROCHLORIDE 60 MG: 60 CAPSULE, DELAYED RELEASE ORAL at 08:50

## 2023-01-01 RX ADMIN — FUROSEMIDE 40 MG: 10 INJECTION, SOLUTION INTRAVENOUS at 08:47

## 2023-01-01 RX ADMIN — IOHEXOL 100 ML: 350 INJECTION, SOLUTION INTRAVENOUS at 11:09

## 2023-01-01 RX ADMIN — INSULIN LISPRO 1 UNITS: 100 INJECTION, SOLUTION INTRAVENOUS; SUBCUTANEOUS at 17:07

## 2023-01-01 RX ADMIN — BUPROPION HYDROCHLORIDE 50 MG: 100 TABLET, FILM COATED ORAL at 17:43

## 2023-01-01 RX ADMIN — KETOROLAC TROMETHAMINE 15 MG: 30 INJECTION, SOLUTION INTRAMUSCULAR; INTRAVENOUS at 19:41

## 2023-01-01 RX ADMIN — HYDROMORPHONE HYDROCHLORIDE 0.2 MG: 0.2 INJECTION, SOLUTION INTRAMUSCULAR; INTRAVENOUS; SUBCUTANEOUS at 08:00

## 2023-01-01 RX ADMIN — DULOXETINE 60 MG: 60 CAPSULE, DELAYED RELEASE ORAL at 08:16

## 2023-01-01 RX ADMIN — METHYLPREDNISOLONE SODIUM SUCCINATE 40 MG: 40 INJECTION, POWDER, FOR SOLUTION INTRAMUSCULAR; INTRAVENOUS at 06:16

## 2023-01-01 RX ADMIN — METHYLPREDNISOLONE SODIUM SUCCINATE 40 MG: 40 INJECTION, POWDER, FOR SOLUTION INTRAMUSCULAR; INTRAVENOUS at 14:52

## 2023-01-01 RX ADMIN — DULOXETINE HYDROCHLORIDE 60 MG: 60 CAPSULE, DELAYED RELEASE ORAL at 08:24

## 2023-01-01 RX ADMIN — ACETAMINOPHEN 650 MG: 650 SUSPENSION ORAL at 18:36

## 2023-01-01 RX ADMIN — VASOPRESSIN 0.04 UNITS/MIN: 20 INJECTION INTRAVENOUS at 14:35

## 2023-01-01 RX ADMIN — DULOXETINE HYDROCHLORIDE 60 MG: 60 CAPSULE, DELAYED RELEASE ORAL at 08:13

## 2023-01-01 RX ADMIN — FUROSEMIDE 40 MG: 10 INJECTION, SOLUTION INTRAVENOUS at 10:14

## 2023-01-01 RX ADMIN — Medication 20 MG: at 08:22

## 2023-01-01 RX ADMIN — GABAPENTIN 100 MG: 250 SOLUTION ORAL at 22:56

## 2023-01-01 RX ADMIN — CHLORHEXIDINE GLUCONATE 15 ML: 1.2 RINSE ORAL at 21:36

## 2023-01-01 RX ADMIN — SENNOSIDES 8.6 MG: 8.6 TABLET, FILM COATED ORAL at 08:33

## 2023-01-01 RX ADMIN — ACETYLCYSTEINE 600 MG: 200 SOLUTION ORAL; RESPIRATORY (INHALATION) at 07:41

## 2023-01-01 RX ADMIN — IPRATROPIUM BROMIDE AND ALBUTEROL SULFATE 3 ML: 2.5; .5 SOLUTION RESPIRATORY (INHALATION) at 02:38

## 2023-01-01 RX ADMIN — MAGNESIUM SULFATE 2 G: 2 INJECTION INTRAVENOUS at 20:14

## 2023-01-01 RX ADMIN — LIDOCAINE 1 PATCH: 700 PATCH TOPICAL at 08:39

## 2023-01-01 RX ADMIN — LEVOFLOXACIN 750 MG: 5 INJECTION, SOLUTION INTRAVENOUS at 03:57

## 2023-01-01 RX ADMIN — FUROSEMIDE 20 MG: 10 INJECTION, SOLUTION INTRAVENOUS at 10:40

## 2023-01-01 RX ADMIN — DIGOXIN 62.5 MCG: 0.25 INJECTION INTRAMUSCULAR; INTRAVENOUS at 09:23

## 2023-01-01 RX ADMIN — HYDROXYCHLOROQUINE SULFATE 200 MG: 200 TABLET, FILM COATED ORAL at 08:24

## 2023-01-01 RX ADMIN — POTASSIUM CHLORIDE 40 MEQ: 1.5 SOLUTION ORAL at 17:01

## 2023-01-01 RX ADMIN — DEXTROSE MONOHYDRATE 50 ML: 25 INJECTION, SOLUTION INTRAVENOUS at 21:55

## 2023-01-01 RX ADMIN — CINACALCET 30 MG: 30 TABLET, FILM COATED ORAL at 08:12

## 2023-01-01 RX ADMIN — FENTANYL CITRATE 50 MCG: 50 INJECTION INTRAMUSCULAR; INTRAVENOUS at 16:20

## 2023-01-01 RX ADMIN — HYDROMORPHONE HYDROCHLORIDE 0.2 MG: 0.2 INJECTION, SOLUTION INTRAMUSCULAR; INTRAVENOUS; SUBCUTANEOUS at 13:32

## 2023-01-01 RX ADMIN — OXYCODONE HYDROCHLORIDE 5 MG: 5 SOLUTION ORAL at 03:34

## 2023-01-01 RX ADMIN — CEFTRIAXONE 1000 MG: 1 INJECTION, SOLUTION INTRAVENOUS at 10:08

## 2023-01-01 RX ADMIN — SODIUM CHLORIDE 250 ML: 0.9 INJECTION, SOLUTION INTRAVENOUS at 17:01

## 2023-01-01 RX ADMIN — HYDROXYCHLOROQUINE SULFATE 200 MG: 200 TABLET, FILM COATED ORAL at 16:29

## 2023-01-01 RX ADMIN — APIXABAN 5 MG: 5 TABLET, FILM COATED ORAL at 17:20

## 2023-01-01 RX ADMIN — DEXTROSE AND SODIUM CHLORIDE 50 ML/HR: 5; .9 INJECTION, SOLUTION INTRAVENOUS at 17:51

## 2023-01-01 RX ADMIN — Medication 20 MG: at 08:03

## 2023-01-01 RX ADMIN — DULOXETINE 60 MG: 60 CAPSULE, DELAYED RELEASE ORAL at 08:07

## 2023-01-01 RX ADMIN — APIXABAN 5 MG: 5 TABLET, FILM COATED ORAL at 08:05

## 2023-01-01 RX ADMIN — DULOXETINE HYDROCHLORIDE 60 MG: 60 CAPSULE, DELAYED RELEASE ORAL at 08:05

## 2023-01-01 RX ADMIN — ACYCLOVIR SODIUM 550 MG: 50 INJECTION, SOLUTION INTRAVENOUS at 06:55

## 2023-01-01 RX ADMIN — BUPROPION HYDROCHLORIDE 50 MG: 100 TABLET, FILM COATED ORAL at 17:23

## 2023-01-01 RX ADMIN — METOPROLOL SUCCINATE 25 MG: 25 TABLET, EXTENDED RELEASE ORAL at 09:21

## 2023-01-01 RX ADMIN — SODIUM BICARBONATE 25 MEQ: 84 INJECTION, SOLUTION INTRAVENOUS at 14:34

## 2023-01-01 RX ADMIN — PIPERACILLIN SODIUM AND TAZOBACTAM SODIUM 3.38 G: 36; 4.5 INJECTION, POWDER, LYOPHILIZED, FOR SOLUTION INTRAVENOUS at 19:32

## 2023-01-01 RX ADMIN — DEXTROSE MONOHYDRATE 25 ML: 25 INJECTION, SOLUTION INTRAVENOUS at 11:05

## 2023-01-01 RX ADMIN — ALBUTEROL SULFATE 2 PUFF: 90 AEROSOL, METERED RESPIRATORY (INHALATION) at 05:03

## 2023-01-01 RX ADMIN — MORPHINE SULFATE 2 MG: 2 INJECTION, SOLUTION INTRAMUSCULAR; INTRAVENOUS at 10:14

## 2023-01-01 RX ADMIN — DEXTROSE MONOHYDRATE 50 ML: 25 INJECTION, SOLUTION INTRAVENOUS at 22:26

## 2023-01-01 RX ADMIN — DIGOXIN 62.5 MCG: 0.25 INJECTION INTRAMUSCULAR; INTRAVENOUS at 08:58

## 2023-01-01 RX ADMIN — HYDROXYCHLOROQUINE SULFATE 200 MG: 200 TABLET, FILM COATED ORAL at 06:42

## 2023-01-01 RX ADMIN — IPRATROPIUM BROMIDE AND ALBUTEROL SULFATE 3 ML: 2.5; .5 SOLUTION RESPIRATORY (INHALATION) at 20:53

## 2023-01-01 RX ADMIN — Medication 20 MG: at 08:58

## 2023-01-01 RX ADMIN — DULOXETINE 60 MG: 60 CAPSULE, DELAYED RELEASE ORAL at 09:22

## 2023-01-01 RX ADMIN — SENNOSIDES 8.6 MG: 8.6 TABLET, FILM COATED ORAL at 17:01

## 2023-01-01 RX ADMIN — Medication 3 ML: at 15:59

## 2023-01-01 RX ADMIN — CHLORHEXIDINE GLUCONATE 15 ML: 1.2 RINSE ORAL at 10:04

## 2023-01-01 RX ADMIN — VASOPRESSIN 0.04 UNITS/MIN: 20 INJECTION INTRAVENOUS at 07:06

## 2023-01-01 RX ADMIN — BISACODYL 10 MG: 10 SUPPOSITORY RECTAL at 08:12

## 2023-01-01 RX ADMIN — OXYCODONE HYDROCHLORIDE 5 MG: 5 SOLUTION ORAL at 11:17

## 2023-01-01 RX ADMIN — ALBUMIN (HUMAN) 25 G: 0.25 INJECTION, SOLUTION INTRAVENOUS at 06:28

## 2023-01-01 RX ADMIN — LAMOTRIGINE 50 MG: 25 TABLET ORAL at 22:35

## 2023-01-01 RX ADMIN — OXYCODONE HYDROCHLORIDE 5 MG: 5 SOLUTION ORAL at 09:24

## 2023-01-01 RX ADMIN — DULOXETINE HYDROCHLORIDE 60 MG: 60 CAPSULE, DELAYED RELEASE ORAL at 17:34

## 2023-01-01 RX ADMIN — DEXTROSE 20 ML/HR: 10 SOLUTION INTRAVENOUS at 18:35

## 2023-01-01 RX ADMIN — GLYCERIN, HYPROMELLOSE, POLYETHYLENE GLYCOL 1 DROP: .2; .2; 1 LIQUID OPHTHALMIC at 21:13

## 2023-01-01 RX ADMIN — Medication 20 MG: at 09:02

## 2023-01-01 RX ADMIN — DEXTROSE AND SODIUM CHLORIDE 50 ML/HR: 5; .9 INJECTION, SOLUTION INTRAVENOUS at 22:47

## 2023-01-01 RX ADMIN — DULOXETINE HYDROCHLORIDE 60 MG: 60 CAPSULE, DELAYED RELEASE ORAL at 17:42

## 2023-01-01 RX ADMIN — Medication 12.5 MG: at 21:36

## 2023-01-01 RX ADMIN — BUPROPION HYDROCHLORIDE 50 MG: 100 TABLET, FILM COATED ORAL at 08:05

## 2023-01-01 RX ADMIN — OXYCODONE HYDROCHLORIDE 5 MG: 5 SOLUTION ORAL at 02:03

## 2023-01-01 RX ADMIN — MIDODRINE HYDROCHLORIDE 5 MG: 5 TABLET ORAL at 17:51

## 2023-01-01 RX ADMIN — Medication 20 MG: at 08:36

## 2023-01-01 RX ADMIN — IPRATROPIUM BROMIDE AND ALBUTEROL SULFATE 3 ML: 2.5; .5 SOLUTION RESPIRATORY (INHALATION) at 14:37

## 2023-01-01 RX ADMIN — VASOPRESSIN 0.04 UNITS/MIN: 20 INJECTION INTRAVENOUS at 21:56

## 2023-01-01 RX ADMIN — HYDROXYCHLOROQUINE SULFATE 200 MG: 200 TABLET, FILM COATED ORAL at 16:38

## 2023-01-01 RX ADMIN — DEXTROSE MONOHYDRATE 25 ML: 25 INJECTION, SOLUTION INTRAVENOUS at 16:45

## 2023-01-01 RX ADMIN — LIDOCAINE 1 PATCH: 700 PATCH TOPICAL at 08:28

## 2023-01-01 RX ADMIN — HYDROXYCHLOROQUINE SULFATE 200 MG: 200 TABLET, FILM COATED ORAL at 08:26

## 2023-01-01 RX ADMIN — ACETAMINOPHEN 975 MG: 650 SUSPENSION ORAL at 08:02

## 2023-01-01 RX ADMIN — FOMEPIZOLE 600 MG: 1 INJECTION, SOLUTION INTRAVENOUS at 21:55

## 2023-01-01 RX ADMIN — INSULIN LISPRO 1 UNITS: 100 INJECTION, SOLUTION INTRAVENOUS; SUBCUTANEOUS at 00:17

## 2023-01-01 RX ADMIN — FENTANYL CITRATE 25 MCG: 50 INJECTION INTRAMUSCULAR; INTRAVENOUS at 09:59

## 2023-01-01 RX ADMIN — Medication 6 MCG/MIN: at 04:08

## 2023-01-01 RX ADMIN — OXYCODONE HYDROCHLORIDE 5 MG: 5 SOLUTION ORAL at 09:13

## 2023-01-01 RX ADMIN — MORPHINE SULFATE 2 MG: 2 INJECTION, SOLUTION INTRAMUSCULAR; INTRAVENOUS at 09:16

## 2023-01-01 RX ADMIN — PIPERACILLIN SODIUM AND TAZOBACTAM SODIUM 3.38 G: 36; 4.5 INJECTION, POWDER, LYOPHILIZED, FOR SOLUTION INTRAVENOUS at 01:02

## 2023-01-01 RX ADMIN — CINACALCET 30 MG: 30 TABLET, FILM COATED ORAL at 08:05

## 2023-01-01 RX ADMIN — DULOXETINE 60 MG: 60 CAPSULE, DELAYED RELEASE ORAL at 08:03

## 2023-01-01 RX ADMIN — SODIUM CHLORIDE 75 ML/HR: 4 INJECTION, SOLUTION, CONCENTRATE INTRAVENOUS at 01:46

## 2023-01-01 RX ADMIN — HYDROMORPHONE HYDROCHLORIDE 0.2 MG: 0.2 INJECTION, SOLUTION INTRAMUSCULAR; INTRAVENOUS; SUBCUTANEOUS at 19:32

## 2023-01-01 RX ADMIN — FUROSEMIDE 40 MG: 10 INJECTION, SOLUTION INTRAVENOUS at 09:22

## 2023-01-01 RX ADMIN — CINACALCET 30 MG: 30 TABLET, FILM COATED ORAL at 08:33

## 2023-01-01 RX ADMIN — IPRATROPIUM BROMIDE AND ALBUTEROL SULFATE 3 ML: 2.5; .5 SOLUTION RESPIRATORY (INHALATION) at 14:47

## 2023-01-01 RX ADMIN — Medication 12.5 MG: at 08:24

## 2023-01-01 RX ADMIN — DIGOXIN 62.5 MCG: 0.25 INJECTION INTRAMUSCULAR; INTRAVENOUS at 07:42

## 2023-01-01 RX ADMIN — CEFEPIME HYDROCHLORIDE 2000 MG: 2 INJECTION, SOLUTION INTRAVENOUS at 10:51

## 2023-01-01 RX ADMIN — MORPHINE SULFATE 2 MG: 2 INJECTION, SOLUTION INTRAMUSCULAR; INTRAVENOUS at 00:45

## 2023-01-01 RX ADMIN — LAMOTRIGINE 50 MG: 100 TABLET ORAL at 21:36

## 2023-01-01 RX ADMIN — DULOXETINE 60 MG: 60 CAPSULE, DELAYED RELEASE ORAL at 17:34

## 2023-01-01 RX ADMIN — APIXABAN 5 MG: 5 TABLET, FILM COATED ORAL at 21:10

## 2023-01-01 RX ADMIN — MAGNESIUM SULFATE 2 G: 2 INJECTION INTRAVENOUS at 05:55

## 2023-01-01 RX ADMIN — ACETYLCYSTEINE 600 MG: 200 SOLUTION ORAL; RESPIRATORY (INHALATION) at 14:07

## 2023-01-01 RX ADMIN — ACETYLCYSTEINE 600 MG: 200 SOLUTION ORAL; RESPIRATORY (INHALATION) at 07:57

## 2023-01-01 RX ADMIN — BUPROPION HYDROCHLORIDE 50 MG: 100 TABLET, FILM COATED ORAL at 08:26

## 2023-01-01 RX ADMIN — BUPROPION HYDROCHLORIDE 50 MG: 100 TABLET, FILM COATED ORAL at 08:13

## 2023-01-01 RX ADMIN — MORPHINE SULFATE 2 MG: 2 INJECTION, SOLUTION INTRAMUSCULAR; INTRAVENOUS at 04:25

## 2023-01-01 RX ADMIN — MIDODRINE HYDROCHLORIDE 5 MG: 5 TABLET ORAL at 02:59

## 2023-01-01 RX ADMIN — DULOXETINE 60 MG: 60 CAPSULE, DELAYED RELEASE ORAL at 18:40

## 2023-01-01 RX ADMIN — APIXABAN 5 MG: 5 TABLET, FILM COATED ORAL at 17:10

## 2023-01-01 RX ADMIN — LORAZEPAM 0.5 MG: 2 INJECTION INTRAMUSCULAR; INTRAVENOUS at 04:46

## 2023-01-01 RX ADMIN — DULOXETINE 60 MG: 60 CAPSULE, DELAYED RELEASE ORAL at 18:35

## 2023-01-01 RX ADMIN — OXYCODONE HYDROCHLORIDE 5 MG: 5 TABLET ORAL at 13:00

## 2023-01-01 RX ADMIN — Medication 12.5 MG: at 21:12

## 2023-01-01 RX ADMIN — METHYLPREDNISOLONE SODIUM SUCCINATE 40 MG: 40 INJECTION, POWDER, FOR SOLUTION INTRAMUSCULAR; INTRAVENOUS at 00:11

## 2023-01-01 RX ADMIN — CHLORHEXIDINE GLUCONATE 15 ML: 1.2 RINSE ORAL at 09:22

## 2023-01-01 RX ADMIN — FUROSEMIDE 40 MG: 10 INJECTION, SOLUTION INTRAVENOUS at 08:25

## 2023-01-01 RX ADMIN — DULOXETINE 60 MG: 60 CAPSULE, DELAYED RELEASE ORAL at 08:58

## 2023-01-01 RX ADMIN — LIDOCAINE 1 PATCH: 700 PATCH TOPICAL at 09:19

## 2023-01-01 RX ADMIN — ONDANSETRON 4 MG: 2 INJECTION INTRAMUSCULAR; INTRAVENOUS at 21:24

## 2023-01-01 RX ADMIN — FUROSEMIDE 40 MG: 10 INJECTION, SOLUTION INTRAVENOUS at 08:36

## 2023-01-01 RX ADMIN — HYDROXYCHLOROQUINE SULFATE 200 MG: 200 TABLET, FILM COATED ORAL at 16:09

## 2023-01-01 RX ADMIN — VANCOMYCIN HYDROCHLORIDE 1250 MG: 5 INJECTION, POWDER, LYOPHILIZED, FOR SOLUTION INTRAVENOUS at 10:00

## 2023-01-01 RX ADMIN — HEPARIN SODIUM 5000 UNITS: 5000 INJECTION INTRAVENOUS; SUBCUTANEOUS at 21:36

## 2023-01-01 RX ADMIN — LAMOTRIGINE 50 MG: 100 TABLET ORAL at 21:04

## 2023-01-01 RX ADMIN — METHYLPREDNISOLONE SODIUM SUCCINATE 40 MG: 40 INJECTION, POWDER, FOR SOLUTION INTRAMUSCULAR; INTRAVENOUS at 17:34

## 2023-01-01 RX ADMIN — LAMOTRIGINE 50 MG: 25 TABLET ORAL at 21:43

## 2023-01-01 RX ADMIN — BUPROPION HYDROCHLORIDE 50 MG: 100 TABLET, FILM COATED ORAL at 08:32

## 2023-01-01 RX ADMIN — FENTANYL CITRATE 25 MCG: 50 INJECTION, SOLUTION INTRAMUSCULAR; INTRAVENOUS at 09:59

## 2023-01-01 RX ADMIN — BUPROPION HYDROCHLORIDE 50 MG: 100 TABLET, FILM COATED ORAL at 18:36

## 2023-01-01 RX ADMIN — SODIUM CHLORIDE, SODIUM GLUCONATE, SODIUM ACETATE, POTASSIUM CHLORIDE, MAGNESIUM CHLORIDE, SODIUM PHOSPHATE, DIBASIC, AND POTASSIUM PHOSPHATE 500 ML: .53; .5; .37; .037; .03; .012; .00082 INJECTION, SOLUTION INTRAVENOUS at 01:53

## 2023-01-01 RX ADMIN — MEROPENEM 1000 MG: 1 INJECTION, POWDER, FOR SOLUTION INTRAVENOUS at 19:36

## 2023-01-01 RX ADMIN — HYDROXYCHLOROQUINE SULFATE 200 MG: 200 TABLET, FILM COATED ORAL at 16:54

## 2023-01-01 RX ADMIN — ACETYLCYSTEINE 2930 MG: 200 INJECTION, SOLUTION INTRAVENOUS at 17:20

## 2023-01-01 RX ADMIN — GABAPENTIN 100 MG: 250 SOLUTION ORAL at 21:16

## 2023-01-01 RX ADMIN — DIGOXIN 250 MCG: 0.25 INJECTION INTRAMUSCULAR; INTRAVENOUS at 19:42

## 2023-01-01 RX ADMIN — EPINEPHRINE 1 MG: 0.1 INJECTION INTRACARDIAC; INTRAVENOUS at 09:39

## 2023-01-01 RX ADMIN — BUPROPION HYDROCHLORIDE 50 MG: 100 TABLET, FILM COATED ORAL at 09:24

## 2023-01-01 RX ADMIN — GABAPENTIN 100 MG: 250 SOLUTION ORAL at 22:25

## 2023-01-01 RX ADMIN — DEXTROSE AND SODIUM CHLORIDE 50 ML/HR: 5; .9 INJECTION, SOLUTION INTRAVENOUS at 13:11

## 2023-01-01 RX ADMIN — IPRATROPIUM BROMIDE AND ALBUTEROL SULFATE 3 ML: 2.5; .5 SOLUTION RESPIRATORY (INHALATION) at 08:45

## 2023-01-01 RX ADMIN — Medication 20 MG: at 08:39

## 2023-01-01 RX ADMIN — POTASSIUM CHLORIDE 20 MEQ: 14.9 INJECTION, SOLUTION INTRAVENOUS at 08:57

## 2023-01-01 RX ADMIN — MILRINONE LACTATE IN DEXTROSE 0.38 MCG/KG/MIN: 200 INJECTION, SOLUTION INTRAVENOUS at 17:48

## 2023-01-01 RX ADMIN — ACETAMINOPHEN 975 MG: 650 SUSPENSION ORAL at 08:24

## 2023-01-01 RX ADMIN — DEXTROSE AND SODIUM CHLORIDE 50 ML/HR: 5; .45 INJECTION, SOLUTION INTRAVENOUS at 09:51

## 2023-01-01 RX ADMIN — GLYCERIN, HYPROMELLOSE, POLYETHYLENE GLYCOL 1 DROP: .2; .2; 1 LIQUID OPHTHALMIC at 08:59

## 2023-01-01 RX ADMIN — DEXTROSE MONOHYDRATE 25 ML: 25 INJECTION, SOLUTION INTRAVENOUS at 23:21

## 2023-01-01 RX ADMIN — FUROSEMIDE 20 MG: 10 INJECTION, SOLUTION INTRAVENOUS at 16:21

## 2023-01-01 RX ADMIN — GABAPENTIN 100 MG: 250 SOLUTION ORAL at 21:44

## 2023-01-01 RX ADMIN — IPRATROPIUM BROMIDE AND ALBUTEROL SULFATE 3 ML: 2.5; .5 SOLUTION RESPIRATORY (INHALATION) at 14:07

## 2023-01-01 RX ADMIN — METOPROLOL SUCCINATE 25 MG: 25 TABLET, EXTENDED RELEASE ORAL at 08:24

## 2023-01-01 RX ADMIN — MAGNESIUM SULFATE HEPTAHYDRATE 2 G: 40 INJECTION, SOLUTION INTRAVENOUS at 06:01

## 2023-01-01 RX ADMIN — ONDANSETRON 4 MG: 2 INJECTION INTRAMUSCULAR; INTRAVENOUS at 19:39

## 2023-01-01 RX ADMIN — IPRATROPIUM BROMIDE AND ALBUTEROL SULFATE 3 ML: 2.5; .5 SOLUTION RESPIRATORY (INHALATION) at 19:25

## 2023-01-01 RX ADMIN — MIDODRINE HYDROCHLORIDE 5 MG: 5 TABLET ORAL at 09:45

## 2023-01-01 RX ADMIN — POLYETHYLENE GLYCOL 3350 17 G: 17 POWDER, FOR SOLUTION ORAL at 08:36

## 2023-01-01 RX ADMIN — METOPROLOL SUCCINATE 25 MG: 25 TABLET, EXTENDED RELEASE ORAL at 08:12

## 2023-01-01 RX ADMIN — POLYETHYLENE GLYCOL 3350 17 G: 17 POWDER, FOR SOLUTION ORAL at 08:45

## 2023-01-01 RX ADMIN — HYDROMORPHONE HYDROCHLORIDE 0.2 MG: 0.2 INJECTION, SOLUTION INTRAMUSCULAR; INTRAVENOUS; SUBCUTANEOUS at 23:05

## 2023-01-01 RX ADMIN — ALUMINUM HYDROXIDE, MAGNESIUM HYDROXIDE, AND DIMETHICONE 30 ML: 200; 20; 200 SUSPENSION ORAL at 15:50

## 2023-01-01 RX ADMIN — MORPHINE SULFATE 7.5 MG: 15 TABLET ORAL at 22:00

## 2023-01-01 RX ADMIN — MEROPENEM 1000 MG: 1 INJECTION, POWDER, FOR SOLUTION INTRAVENOUS at 13:13

## 2023-01-01 RX ADMIN — IPRATROPIUM BROMIDE AND ALBUTEROL SULFATE 3 ML: 2.5; .5 SOLUTION RESPIRATORY (INHALATION) at 07:41

## 2023-01-01 RX ADMIN — ACETAMINOPHEN 325MG 650 MG: 325 TABLET ORAL at 17:42

## 2023-01-01 RX ADMIN — DULOXETINE HYDROCHLORIDE 60 MG: 60 CAPSULE, DELAYED RELEASE ORAL at 17:23

## 2023-01-01 RX ADMIN — METHYLPREDNISOLONE SODIUM SUCCINATE 40 MG: 40 INJECTION, POWDER, FOR SOLUTION INTRAMUSCULAR; INTRAVENOUS at 12:20

## 2023-01-01 RX ADMIN — BUPROPION HYDROCHLORIDE 50 MG: 100 TABLET, FILM COATED ORAL at 17:49

## 2023-01-01 RX ADMIN — IPRATROPIUM BROMIDE AND ALBUTEROL SULFATE 3 ML: 2.5; .5 SOLUTION RESPIRATORY (INHALATION) at 02:33

## 2023-01-01 RX ADMIN — PIPERACILLIN SODIUM AND TAZOBACTAM SODIUM 3.38 G: 36; 4.5 INJECTION, POWDER, LYOPHILIZED, FOR SOLUTION INTRAVENOUS at 08:24

## 2023-01-01 RX ADMIN — VANCOMYCIN HYDROCHLORIDE 1250 MG: 5 INJECTION, POWDER, LYOPHILIZED, FOR SOLUTION INTRAVENOUS at 10:08

## 2023-01-01 RX ADMIN — POTASSIUM CHLORIDE 20 MEQ: 14.9 INJECTION, SOLUTION INTRAVENOUS at 10:51

## 2023-01-01 RX ADMIN — BISACODYL 10 MG: 10 SUPPOSITORY RECTAL at 08:36

## 2023-01-01 RX ADMIN — HYDROXYCHLOROQUINE SULFATE 200 MG: 200 TABLET, FILM COATED ORAL at 09:20

## 2023-01-01 RX ADMIN — POLYETHYLENE GLYCOL 3350 17 G: 17 POWDER, FOR SOLUTION ORAL at 09:22

## 2023-01-01 RX ADMIN — ALUMINUM HYDROXIDE, MAGNESIUM HYDROXIDE, AND DIMETHICONE 30 ML: 200; 20; 200 SUSPENSION ORAL at 17:32

## 2023-01-01 RX ADMIN — GLYCERIN, HYPROMELLOSE, POLYETHYLENE GLYCOL 1 DROP: .2; .2; 1 LIQUID OPHTHALMIC at 16:36

## 2023-01-01 RX ADMIN — HEPARIN SODIUM 5000 UNITS: 5000 INJECTION INTRAVENOUS; SUBCUTANEOUS at 13:21

## 2023-01-01 RX ADMIN — FENTANYL CITRATE 50 MCG: 50 INJECTION INTRAMUSCULAR; INTRAVENOUS at 04:05

## 2023-01-01 RX ADMIN — APIXABAN 5 MG: 5 TABLET, FILM COATED ORAL at 18:36

## 2023-01-01 RX ADMIN — OXYCODONE HYDROCHLORIDE 5 MG: 5 SOLUTION ORAL at 17:34

## 2023-01-01 RX ADMIN — POTASSIUM CHLORIDE 20 MEQ: 14.9 INJECTION, SOLUTION INTRAVENOUS at 11:08

## 2023-01-01 RX ADMIN — LAMOTRIGINE 50 MG: 25 TABLET ORAL at 22:27

## 2023-01-01 RX ADMIN — DULOXETINE 60 MG: 60 CAPSULE, DELAYED RELEASE ORAL at 12:15

## 2023-01-01 RX ADMIN — IPRATROPIUM BROMIDE AND ALBUTEROL SULFATE 3 ML: 2.5; .5 SOLUTION RESPIRATORY (INHALATION) at 14:13

## 2023-01-01 RX ADMIN — DIGOXIN 62.5 MCG: 0.25 INJECTION INTRAMUSCULAR; INTRAVENOUS at 08:21

## 2023-01-01 RX ADMIN — IPRATROPIUM BROMIDE AND ALBUTEROL SULFATE 3 ML: 2.5; .5 SOLUTION RESPIRATORY (INHALATION) at 13:43

## 2023-01-01 RX ADMIN — FENTANYL CITRATE 25 MCG: 50 INJECTION INTRAMUSCULAR; INTRAVENOUS at 10:33

## 2023-01-01 RX ADMIN — ACETAMINOPHEN 650 MG: 650 SUSPENSION ORAL at 23:09

## 2023-01-01 RX ADMIN — HYDROXYCHLOROQUINE SULFATE 200 MG: 200 TABLET, FILM COATED ORAL at 08:35

## 2023-01-01 RX ADMIN — MEROPENEM 1000 MG: 1 INJECTION, POWDER, FOR SOLUTION INTRAVENOUS at 02:03

## 2023-01-01 RX ADMIN — HYDROMORPHONE HYDROCHLORIDE 0.2 MG: 0.2 INJECTION, SOLUTION INTRAMUSCULAR; INTRAVENOUS; SUBCUTANEOUS at 10:31

## 2023-01-01 RX ADMIN — DULOXETINE 60 MG: 60 CAPSULE, DELAYED RELEASE ORAL at 09:02

## 2023-01-01 RX ADMIN — Medication 40 MG/HR: at 15:59

## 2023-01-01 RX ADMIN — CHLORHEXIDINE GLUCONATE 15 ML: 1.2 RINSE ORAL at 08:36

## 2023-01-01 RX ADMIN — EPINEPHRINE 1 MG: 0.1 INJECTION INTRACARDIAC; INTRAVENOUS at 09:27

## 2023-01-01 RX ADMIN — MAGNESIUM SULFATE HEPTAHYDRATE 4 G: 40 INJECTION, SOLUTION INTRAVENOUS at 19:29

## 2023-01-01 RX ADMIN — GABAPENTIN 300 MG: 250 SOLUTION ORAL at 11:00

## 2023-01-01 RX ADMIN — LAMOTRIGINE 50 MG: 25 TABLET ORAL at 01:09

## 2023-01-01 RX ADMIN — ACETAMINOPHEN 975 MG: 650 SUSPENSION ORAL at 01:03

## 2023-01-01 RX ADMIN — ACETAMINOPHEN 325MG 650 MG: 325 TABLET ORAL at 23:29

## 2023-01-01 RX ADMIN — APIXABAN 5 MG: 5 TABLET, FILM COATED ORAL at 17:43

## 2023-01-01 RX ADMIN — SENNOSIDES 8.6 MG: 8.6 TABLET, FILM COATED ORAL at 08:34

## 2023-01-01 RX ADMIN — DULOXETINE HYDROCHLORIDE 60 MG: 60 CAPSULE, DELAYED RELEASE ORAL at 17:11

## 2023-01-01 RX ADMIN — CHLORHEXIDINE GLUCONATE 15 ML: 1.2 RINSE ORAL at 08:06

## 2023-01-01 RX ADMIN — CHLORHEXIDINE GLUCONATE 15 ML: 1.2 RINSE ORAL at 02:11

## 2023-01-01 RX ADMIN — DULOXETINE 60 MG: 60 CAPSULE, DELAYED RELEASE ORAL at 08:25

## 2023-01-01 RX ADMIN — CHLORHEXIDINE GLUCONATE 15 ML: 1.2 RINSE ORAL at 09:08

## 2023-01-01 RX ADMIN — Medication 25 MCG/HR: at 09:59

## 2023-01-01 RX ADMIN — ONDANSETRON 4 MG: 2 INJECTION INTRAMUSCULAR; INTRAVENOUS at 21:48

## 2023-01-01 RX ADMIN — HYDROXYCHLOROQUINE SULFATE 200 MG: 200 TABLET, FILM COATED ORAL at 17:24

## 2023-01-01 RX ADMIN — ACETYLCYSTEINE 600 MG: 200 SOLUTION ORAL; RESPIRATORY (INHALATION) at 20:23

## 2023-01-01 RX ADMIN — Medication 40 MG: at 05:09

## 2023-01-01 RX ADMIN — DIPHENHYDRAMINE HYDROCHLORIDE 25 MG: 50 INJECTION, SOLUTION INTRAMUSCULAR; INTRAVENOUS at 13:15

## 2023-01-01 RX ADMIN — ACETAMINOPHEN 975 MG: 650 SUSPENSION ORAL at 16:35

## 2023-01-01 RX ADMIN — IPRATROPIUM BROMIDE AND ALBUTEROL SULFATE 3 ML: 2.5; .5 SOLUTION RESPIRATORY (INHALATION) at 07:57

## 2023-01-01 RX ADMIN — LIDOCAINE 1 PATCH: 700 PATCH TOPICAL at 08:22

## 2023-01-01 RX ADMIN — POLYETHYLENE GLYCOL 3350 17 G: 17 POWDER, FOR SOLUTION ORAL at 08:58

## 2023-01-01 RX ADMIN — LIDOCAINE 1 PATCH: 700 PATCH TOPICAL at 09:02

## 2023-01-01 RX ADMIN — IPRATROPIUM BROMIDE 1 MG: 0.5 SOLUTION RESPIRATORY (INHALATION) at 15:58

## 2023-01-01 RX ADMIN — GLYCERIN, HYPROMELLOSE, POLYETHYLENE GLYCOL 1 DROP: .2; .2; 1 LIQUID OPHTHALMIC at 16:25

## 2023-01-01 RX ADMIN — DEXTROSE MONOHYDRATE 50 ML: 25 INJECTION, SOLUTION INTRAVENOUS at 10:52

## 2023-01-01 RX ADMIN — FUROSEMIDE 40 MG: 10 INJECTION, SOLUTION INTRAVENOUS at 16:54

## 2023-01-01 RX ADMIN — OXYCODONE HYDROCHLORIDE 5 MG: 5 SOLUTION ORAL at 10:04

## 2023-01-01 RX ADMIN — GLYCERIN, HYPROMELLOSE, POLYETHYLENE GLYCOL 1 DROP: .2; .2; 1 LIQUID OPHTHALMIC at 15:24

## 2023-01-01 RX ADMIN — APIXABAN 5 MG: 5 TABLET, FILM COATED ORAL at 17:49

## 2023-01-01 RX ADMIN — APIXABAN 5 MG: 5 TABLET, FILM COATED ORAL at 09:07

## 2023-01-01 RX ADMIN — MORPHINE SULFATE 7.5 MG: 15 TABLET ORAL at 13:53

## 2023-01-01 RX ADMIN — HYDROXYCHLOROQUINE SULFATE 200 MG: 200 TABLET, FILM COATED ORAL at 17:02

## 2023-01-01 RX ADMIN — DEXTROSE AND SODIUM CHLORIDE 30 ML/HR: 5; .45 INJECTION, SOLUTION INTRAVENOUS at 16:45

## 2023-01-01 RX ADMIN — ALBUMIN (HUMAN) 25 G: 0.25 INJECTION, SOLUTION INTRAVENOUS at 23:51

## 2023-01-01 RX ADMIN — HYDROXYCHLOROQUINE SULFATE 200 MG: 200 TABLET, FILM COATED ORAL at 17:23

## 2023-01-01 RX ADMIN — FOMEPIZOLE 600 MG: 1 INJECTION, SOLUTION INTRAVENOUS at 10:00

## 2023-01-01 RX ADMIN — PIPERACILLIN SODIUM AND TAZOBACTAM SODIUM 3.38 G: 36; 4.5 INJECTION, POWDER, LYOPHILIZED, FOR SOLUTION INTRAVENOUS at 15:16

## 2023-01-01 RX ADMIN — Medication 20 MG: at 08:07

## 2023-01-01 RX ADMIN — Medication 5 MCG/MIN: at 10:08

## 2023-01-01 RX ADMIN — ALUMINUM HYDROXIDE, MAGNESIUM HYDROXIDE, AND DIMETHICONE 30 ML: 200; 20; 200 SUSPENSION ORAL at 17:33

## 2023-01-01 RX ADMIN — OXYCODONE HYDROCHLORIDE 5 MG: 5 SOLUTION ORAL at 10:39

## 2023-01-01 RX ADMIN — HYDROXYCHLOROQUINE SULFATE 200 MG: 200 TABLET, FILM COATED ORAL at 18:36

## 2023-01-01 RX ADMIN — SODIUM BICARBONATE 50 MEQ: 84 INJECTION, SOLUTION INTRAVENOUS at 09:40

## 2023-01-01 RX ADMIN — POTASSIUM CHLORIDE 20 MEQ: 14.9 INJECTION, SOLUTION INTRAVENOUS at 20:48

## 2023-01-01 RX ADMIN — HYDROXYCHLOROQUINE SULFATE 200 MG: 200 TABLET, FILM COATED ORAL at 17:34

## 2023-01-01 RX ADMIN — POTASSIUM CHLORIDE 20 MEQ: 14.9 INJECTION, SOLUTION INTRAVENOUS at 10:38

## 2023-01-01 RX ADMIN — BUPROPION HYDROCHLORIDE 50 MG: 100 TABLET, FILM COATED ORAL at 08:02

## 2023-01-01 RX ADMIN — HYDROXYCHLOROQUINE SULFATE 200 MG: 200 TABLET, FILM COATED ORAL at 07:44

## 2023-01-01 RX ADMIN — ALBUMIN (HUMAN) 12.5 G: 0.25 INJECTION, SOLUTION INTRAVENOUS at 13:34

## 2023-01-01 RX ADMIN — FUROSEMIDE 20 MG: 10 INJECTION, SOLUTION INTRAVENOUS at 11:50

## 2023-01-01 RX ADMIN — DEXTROSE 75 ML/HR: 5 SOLUTION INTRAVENOUS at 11:38

## 2023-01-01 RX ADMIN — ACETAMINOPHEN 975 MG: 650 SUSPENSION ORAL at 17:09

## 2023-01-01 RX ADMIN — MILRINONE LACTATE IN DEXTROSE 0.13 MCG/KG/MIN: 200 INJECTION, SOLUTION INTRAVENOUS at 19:13

## 2023-01-01 RX ADMIN — IPRATROPIUM BROMIDE AND ALBUTEROL SULFATE 3 ML: 2.5; .5 SOLUTION RESPIRATORY (INHALATION) at 07:21

## 2023-01-01 RX ADMIN — CHLORHEXIDINE GLUCONATE 15 ML: 1.2 RINSE ORAL at 21:13

## 2023-01-01 RX ADMIN — DULOXETINE 60 MG: 60 CAPSULE, DELAYED RELEASE ORAL at 11:17

## 2023-01-01 RX ADMIN — MORPHINE SULFATE 7.5 MG: 15 TABLET ORAL at 20:36

## 2023-01-01 RX ADMIN — ONDANSETRON 4 MG: 2 INJECTION INTRAMUSCULAR; INTRAVENOUS at 14:01

## 2023-01-01 RX ADMIN — GABAPENTIN 100 MG: 250 SOLUTION ORAL at 20:27

## 2023-01-01 RX ADMIN — LIDOCAINE 1 PATCH: 700 PATCH TOPICAL at 08:36

## 2023-01-01 RX ADMIN — DIGOXIN 500 MCG: 0.25 INJECTION INTRAMUSCULAR; INTRAVENOUS at 13:27

## 2023-01-01 RX ADMIN — HYDROXYCHLOROQUINE SULFATE 200 MG: 200 TABLET, FILM COATED ORAL at 17:49

## 2023-01-01 RX ADMIN — MIDODRINE HYDROCHLORIDE 5 MG: 5 TABLET ORAL at 10:04

## 2023-01-01 RX ADMIN — ACETYLCYSTEINE 8790 MG: 200 INJECTION, SOLUTION INTRAVENOUS at 15:08

## 2023-01-01 RX ADMIN — IPRATROPIUM BROMIDE AND ALBUTEROL SULFATE 3 ML: 2.5; .5 SOLUTION RESPIRATORY (INHALATION) at 14:14

## 2023-01-01 RX ADMIN — LIDOCAINE 1 PATCH: 700 PATCH TOPICAL at 08:16

## 2023-01-01 RX ADMIN — POTASSIUM CHLORIDE 20 MEQ: 14.9 INJECTION, SOLUTION INTRAVENOUS at 10:33

## 2023-01-01 RX ADMIN — OXYCODONE HYDROCHLORIDE 5 MG: 5 SOLUTION ORAL at 02:30

## 2023-01-01 RX ADMIN — LIDOCAINE 1 PATCH: 700 PATCH TOPICAL at 08:12

## 2023-01-01 RX ADMIN — DULOXETINE 60 MG: 60 CAPSULE, DELAYED RELEASE ORAL at 17:03

## 2023-01-01 RX ADMIN — Medication 20 MG: at 10:23

## 2023-01-01 RX ADMIN — Medication 20 MG: at 08:48

## 2023-01-01 RX ADMIN — CHLORHEXIDINE GLUCONATE 15 ML: 1.2 RINSE ORAL at 08:58

## 2023-01-01 RX ADMIN — OXYCODONE HYDROCHLORIDE 2.5 MG: 5 SOLUTION ORAL at 02:59

## 2023-01-01 RX ADMIN — VANCOMYCIN HYDROCHLORIDE 1500 MG: 1 INJECTION, POWDER, LYOPHILIZED, FOR SOLUTION INTRAVENOUS at 13:13

## 2023-01-01 RX ADMIN — OXYCODONE HYDROCHLORIDE 5 MG: 5 SOLUTION ORAL at 03:11

## 2023-01-01 RX ADMIN — FUROSEMIDE 40 MG: 10 INJECTION, SOLUTION INTRAVENOUS at 08:07

## 2023-01-01 RX ADMIN — VASOPRESSIN 0.04 UNITS/MIN: 20 INJECTION INTRAVENOUS at 17:40

## 2023-01-01 RX ADMIN — HYDROXYCHLOROQUINE SULFATE 200 MG: 200 TABLET, FILM COATED ORAL at 16:18

## 2023-01-01 RX ADMIN — DULOXETINE 60 MG: 60 CAPSULE, DELAYED RELEASE ORAL at 08:45

## 2023-01-01 RX ADMIN — APIXABAN 5 MG: 5 TABLET, FILM COATED ORAL at 08:24

## 2023-01-01 RX ADMIN — DEXTROSE MONOHYDRATE 25 ML: 25 INJECTION, SOLUTION INTRAVENOUS at 08:13

## 2023-01-01 RX ADMIN — Medication 20 MG: at 09:19

## 2023-01-01 RX ADMIN — DULOXETINE 60 MG: 60 CAPSULE, DELAYED RELEASE ORAL at 08:24

## 2023-01-01 RX ADMIN — GLYCERIN, HYPROMELLOSE, POLYETHYLENE GLYCOL 1 DROP: .2; .2; 1 LIQUID OPHTHALMIC at 22:24

## 2023-01-01 RX ADMIN — ACETAMINOPHEN 325MG 650 MG: 325 TABLET ORAL at 14:58

## 2023-01-01 RX ADMIN — GLYCERIN, HYPROMELLOSE, POLYETHYLENE GLYCOL 1 DROP: .2; .2; 1 LIQUID OPHTHALMIC at 16:54

## 2023-01-01 RX ADMIN — GLYCERIN, HYPROMELLOSE, POLYETHYLENE GLYCOL 1 DROP: .2; .2; 1 LIQUID OPHTHALMIC at 08:08

## 2023-01-01 RX ADMIN — BUPROPION HYDROCHLORIDE 50 MG: 100 TABLET, FILM COATED ORAL at 09:57

## 2023-01-01 RX ADMIN — ALBUTEROL SULFATE 10 MG: 2.5 SOLUTION RESPIRATORY (INHALATION) at 15:58

## 2023-01-01 RX ADMIN — LAMOTRIGINE 50 MG: 25 TABLET ORAL at 21:13

## 2023-01-01 RX ADMIN — ACYCLOVIR SODIUM 550 MG: 50 INJECTION, SOLUTION INTRAVENOUS at 17:54

## 2023-01-01 RX ADMIN — HYDROMORPHONE HYDROCHLORIDE 0.2 MG: 0.2 INJECTION, SOLUTION INTRAMUSCULAR; INTRAVENOUS; SUBCUTANEOUS at 16:36

## 2023-01-01 RX ADMIN — FENTANYL CITRATE 50 MCG: 50 INJECTION INTRAMUSCULAR; INTRAVENOUS at 19:52

## 2023-01-01 RX ADMIN — METHYLPREDNISOLONE SODIUM SUCCINATE 40 MG: 40 INJECTION, POWDER, FOR SOLUTION INTRAMUSCULAR; INTRAVENOUS at 00:17

## 2023-01-01 RX ADMIN — Medication 12.5 MG: at 09:45

## 2023-01-01 RX ADMIN — VASOPRESSIN 0.04 UNITS/MIN: 20 INJECTION INTRAVENOUS at 08:30

## 2023-01-01 RX ADMIN — CALCIUM GLUCONATE 2 G: 20 INJECTION, SOLUTION INTRAVENOUS at 20:49

## 2023-01-01 RX ADMIN — ALBUTEROL SULFATE 2 PUFF: 90 AEROSOL, METERED RESPIRATORY (INHALATION) at 14:53

## 2023-01-01 RX ADMIN — Medication 12.5 MG: at 21:40

## 2023-01-01 RX ADMIN — SENNOSIDES 8.6 MG: 8.6 TABLET, FILM COATED ORAL at 17:43

## 2023-01-01 RX ADMIN — POTASSIUM CHLORIDE 40 MEQ: 1.5 SOLUTION ORAL at 09:02

## 2023-01-01 RX ADMIN — MORPHINE SULFATE 7.5 MG: 15 TABLET ORAL at 17:20

## 2023-01-01 RX ADMIN — ACETAMINOPHEN 975 MG: 650 SUSPENSION ORAL at 08:16

## 2023-01-01 RX ADMIN — SODIUM CHLORIDE 75 ML/HR: 4 INJECTION, SOLUTION, CONCENTRATE INTRAVENOUS at 15:11

## 2023-01-01 RX ADMIN — VASOPRESSIN 0.04 UNITS/MIN: 20 INJECTION INTRAVENOUS at 19:00

## 2023-01-01 RX ADMIN — DIGOXIN 62.5 MCG: 0.25 INJECTION INTRAMUSCULAR; INTRAVENOUS at 08:07

## 2023-01-01 RX ADMIN — DULOXETINE 60 MG: 60 CAPSULE, DELAYED RELEASE ORAL at 10:23

## 2023-01-01 RX ADMIN — FENTANYL CITRATE 50 MCG: 50 INJECTION INTRAMUSCULAR; INTRAVENOUS at 20:19

## 2023-01-01 RX ADMIN — DEXTROSE MONOHYDRATE 25 G: 25 INJECTION, SOLUTION INTRAVENOUS at 09:40

## 2023-01-01 RX ADMIN — DEXTROSE AND SODIUM CHLORIDE 50 ML/HR: 5; .9 INJECTION, SOLUTION INTRAVENOUS at 03:12

## 2023-01-01 RX ADMIN — Medication 12.5 MG: at 08:01

## 2023-01-01 RX ADMIN — ACETAMINOPHEN 325MG 650 MG: 325 TABLET ORAL at 05:51

## 2023-01-01 RX ADMIN — GABAPENTIN 100 MG: 250 SOLUTION ORAL at 21:58

## 2023-01-01 RX ADMIN — OXYCODONE HYDROCHLORIDE 5 MG: 5 SOLUTION ORAL at 17:51

## 2023-01-01 RX ADMIN — DULOXETINE 60 MG: 60 CAPSULE, DELAYED RELEASE ORAL at 17:51

## 2023-01-01 RX ADMIN — BISACODYL 10 MG: 10 SUPPOSITORY RECTAL at 08:58

## 2023-01-01 RX ADMIN — ACYCLOVIR SODIUM 550 MG: 50 INJECTION, SOLUTION INTRAVENOUS at 08:38

## 2023-01-01 RX ADMIN — GLYCERIN, HYPROMELLOSE, POLYETHYLENE GLYCOL 1 DROP: .2; .2; 1 LIQUID OPHTHALMIC at 08:37

## 2023-01-01 RX ADMIN — MORPHINE SULFATE 7.5 MG: 15 TABLET ORAL at 10:45

## 2023-01-01 RX ADMIN — ACETAMINOPHEN 975 MG: 650 SUSPENSION ORAL at 08:00

## 2023-01-01 RX ADMIN — DEXTROSE MONOHYDRATE 25 ML: 25 INJECTION, SOLUTION INTRAVENOUS at 06:37

## 2023-01-01 RX ADMIN — DULOXETINE 60 MG: 60 CAPSULE, DELAYED RELEASE ORAL at 17:52

## 2023-01-01 RX ADMIN — ACETAMINOPHEN 325MG 650 MG: 325 TABLET ORAL at 00:10

## 2023-01-01 RX ADMIN — SENNOSIDES 8.6 MG: 8.6 TABLET, FILM COATED ORAL at 17:10

## 2023-01-01 RX ADMIN — Medication 20 MG: at 08:24

## 2023-01-01 RX ADMIN — PIPERACILLIN SODIUM AND TAZOBACTAM SODIUM 3.38 G: 36; 4.5 INJECTION, POWDER, LYOPHILIZED, FOR SOLUTION INTRAVENOUS at 07:44

## 2023-01-01 RX ADMIN — FUROSEMIDE 20 MG: 10 INJECTION, SOLUTION INTRAVENOUS at 14:25

## 2023-01-01 RX ADMIN — BUPROPION HYDROCHLORIDE 50 MG: 100 TABLET, FILM COATED ORAL at 17:10

## 2023-01-01 RX ADMIN — APIXABAN 5 MG: 5 TABLET, FILM COATED ORAL at 10:04

## 2023-01-01 RX ADMIN — HEPARIN SODIUM 5000 UNITS: 5000 INJECTION INTRAVENOUS; SUBCUTANEOUS at 14:02

## 2023-01-01 RX ADMIN — PIPERACILLIN SODIUM AND TAZOBACTAM SODIUM 3.38 G: 36; 4.5 INJECTION, POWDER, LYOPHILIZED, FOR SOLUTION INTRAVENOUS at 13:06

## 2023-01-01 RX ADMIN — DIBASIC SODIUM PHOSPHATE, MONOBASIC POTASSIUM PHOSPHATE AND MONOBASIC SODIUM PHOSPHATE 2 TABLET: 852; 155; 130 TABLET ORAL at 01:00

## 2023-01-01 RX ADMIN — IPRATROPIUM BROMIDE AND ALBUTEROL SULFATE 3 ML: 2.5; .5 SOLUTION RESPIRATORY (INHALATION) at 07:16

## 2023-01-01 RX ADMIN — DEXTROSE MONOHYDRATE 25 ML: 25 INJECTION, SOLUTION INTRAVENOUS at 12:02

## 2023-01-01 RX ADMIN — DEXTROSE MONOHYDRATE 50 ML: 25 INJECTION, SOLUTION INTRAVENOUS at 18:12

## 2023-01-01 RX ADMIN — POLYETHYLENE GLYCOL 3350 17 G: 17 POWDER, FOR SOLUTION ORAL at 08:25

## 2023-01-01 RX ADMIN — APIXABAN 5 MG: 5 TABLET, FILM COATED ORAL at 09:45

## 2023-01-01 RX ADMIN — FOMEPIZOLE 600 MG: 1 INJECTION, SOLUTION INTRAVENOUS at 09:27

## 2023-01-01 RX ADMIN — POTASSIUM CHLORIDE 40 MEQ: 1.5 SOLUTION ORAL at 09:45

## 2023-01-01 RX ADMIN — DIGOXIN 62.5 MCG: 0.25 INJECTION INTRAMUSCULAR; INTRAVENOUS at 08:36

## 2023-01-01 RX ADMIN — POTASSIUM CHLORIDE 40 MEQ: 1.5 SOLUTION ORAL at 10:09

## 2023-01-01 RX ADMIN — BUPROPION HYDROCHLORIDE 50 MG: 100 TABLET, FILM COATED ORAL at 17:09

## 2023-01-01 RX ADMIN — FENTANYL CITRATE 25 MCG: 50 INJECTION INTRAMUSCULAR; INTRAVENOUS at 13:47

## 2023-01-01 RX ADMIN — LIDOCAINE 1 PATCH: 700 PATCH TOPICAL at 08:58

## 2023-01-01 RX ADMIN — APIXABAN 5 MG: 5 TABLET, FILM COATED ORAL at 08:13

## 2023-01-01 RX ADMIN — GLYCERIN, HYPROMELLOSE, POLYETHYLENE GLYCOL 1 DROP: .2; .2; 1 LIQUID OPHTHALMIC at 09:23

## 2023-01-01 RX ADMIN — ACETAMINOPHEN 325MG 650 MG: 325 TABLET ORAL at 12:26

## 2023-01-01 RX ADMIN — ONDANSETRON 4 MG: 2 INJECTION INTRAMUSCULAR; INTRAVENOUS at 01:16

## 2023-01-01 RX ADMIN — ACETAMINOPHEN 975 MG: 650 SUSPENSION ORAL at 00:27

## 2023-01-01 RX ADMIN — MORPHINE SULFATE 2 MG: 2 INJECTION, SOLUTION INTRAMUSCULAR; INTRAVENOUS at 16:20

## 2023-01-01 RX ADMIN — DULOXETINE 60 MG: 60 CAPSULE, DELAYED RELEASE ORAL at 08:36

## 2023-01-01 RX ADMIN — HYDROXYCHLOROQUINE SULFATE 200 MG: 200 TABLET, FILM COATED ORAL at 20:24

## 2023-01-01 RX ADMIN — VASOPRESSIN 0.04 UNITS/MIN: 20 INJECTION INTRAVENOUS at 22:58

## 2023-01-01 RX ADMIN — OXYCODONE HYDROCHLORIDE 5 MG: 5 SOLUTION ORAL at 10:09

## 2023-01-01 RX ADMIN — Medication 20 MG: at 11:00

## 2023-01-01 RX ADMIN — BUPROPION HYDROCHLORIDE 50 MG: 100 TABLET, FILM COATED ORAL at 17:34

## 2023-01-01 RX ADMIN — POLYETHYLENE GLYCOL 3350 17 G: 17 POWDER, FOR SOLUTION ORAL at 08:07

## 2023-01-01 RX ADMIN — POTASSIUM CHLORIDE 40 MEQ: 1.5 SOLUTION ORAL at 07:13

## 2023-01-01 RX ADMIN — ACETAMINOPHEN 975 MG: 650 SUSPENSION ORAL at 16:08

## 2023-01-01 RX ADMIN — ACETYLCYSTEINE 600 MG: 200 SOLUTION ORAL; RESPIRATORY (INHALATION) at 14:14

## 2023-01-01 RX ADMIN — GLYCERIN, HYPROMELLOSE, POLYETHYLENE GLYCOL 1 DROP: .2; .2; 1 LIQUID OPHTHALMIC at 11:17

## 2023-01-01 RX ADMIN — LAMOTRIGINE 50 MG: 25 TABLET ORAL at 21:54

## 2023-01-01 RX ADMIN — EPINEPHRINE 1 MG: 0.1 INJECTION INTRACARDIAC; INTRAVENOUS at 09:37

## 2023-01-01 RX ADMIN — MORPHINE SULFATE 7.5 MG: 15 TABLET ORAL at 03:33

## 2023-01-01 RX ADMIN — OXYCODONE HYDROCHLORIDE 5 MG: 5 SOLUTION ORAL at 18:33

## 2023-01-01 RX ADMIN — HEPARIN SODIUM 5000 UNITS: 5000 INJECTION INTRAVENOUS; SUBCUTANEOUS at 06:12

## 2023-01-01 RX ADMIN — DULOXETINE 60 MG: 60 CAPSULE, DELAYED RELEASE ORAL at 20:24

## 2023-01-01 RX ADMIN — ACETYLCYSTEINE 600 MG: 200 SOLUTION ORAL; RESPIRATORY (INHALATION) at 19:25

## 2023-01-01 RX ADMIN — HYDROMORPHONE HYDROCHLORIDE 0.2 MG: 0.2 INJECTION, SOLUTION INTRAMUSCULAR; INTRAVENOUS; SUBCUTANEOUS at 05:30

## 2023-01-01 RX ADMIN — IPRATROPIUM BROMIDE AND ALBUTEROL SULFATE 3 ML: 2.5; .5 SOLUTION RESPIRATORY (INHALATION) at 07:32

## 2023-01-01 RX ADMIN — GLYCERIN, HYPROMELLOSE, POLYETHYLENE GLYCOL 1 DROP: .2; .2; 1 LIQUID OPHTHALMIC at 08:52

## 2023-01-01 RX ADMIN — CHLORHEXIDINE GLUCONATE 15 ML: 1.2 RINSE ORAL at 22:56

## 2023-01-01 RX ADMIN — LIDOCAINE 1 PATCH: 700 PATCH TOPICAL at 09:05

## 2023-01-01 RX ADMIN — CEFTRIAXONE 1000 MG: 1 INJECTION, SOLUTION INTRAVENOUS at 10:35

## 2023-01-01 RX ADMIN — Medication 10 MG/HR: at 07:24

## 2023-01-01 RX ADMIN — ACETYLCYSTEINE 600 MG: 200 SOLUTION ORAL; RESPIRATORY (INHALATION) at 07:32

## 2023-01-01 RX ADMIN — LEVOFLOXACIN 750 MG: 5 INJECTION, SOLUTION INTRAVENOUS at 18:14

## 2023-01-01 RX ADMIN — HYDROMORPHONE HYDROCHLORIDE 0.2 MG: 0.2 INJECTION, SOLUTION INTRAMUSCULAR; INTRAVENOUS; SUBCUTANEOUS at 10:10

## 2023-01-01 RX ADMIN — Medication 25 MEQ: at 14:34

## 2023-01-01 RX ADMIN — DOCUSATE SODIUM 100 MG: 100 CAPSULE, LIQUID FILLED ORAL at 17:43

## 2023-01-01 RX ADMIN — CHLORHEXIDINE GLUCONATE 15 ML: 1.2 RINSE ORAL at 20:00

## 2023-01-01 RX ADMIN — MAGNESIUM SULFATE HEPTAHYDRATE 1 G: 1 INJECTION, SOLUTION INTRAVENOUS at 07:12

## 2023-01-01 RX ADMIN — GABAPENTIN 100 MG: 250 SOLUTION ORAL at 22:07

## 2023-01-01 RX ADMIN — PANTOPRAZOLE SODIUM 40 MG: 40 INJECTION, POWDER, FOR SOLUTION INTRAVENOUS at 08:50

## 2023-01-01 RX ADMIN — ACETAMINOPHEN 325MG 650 MG: 325 TABLET ORAL at 12:30

## 2023-01-01 RX ADMIN — ACYCLOVIR SODIUM 550 MG: 50 INJECTION, SOLUTION INTRAVENOUS at 18:00

## 2023-01-01 RX ADMIN — Medication 20 MG: at 11:17

## 2023-01-01 RX ADMIN — ACETYLCYSTEINE 600 MG: 200 SOLUTION ORAL; RESPIRATORY (INHALATION) at 13:27

## 2023-01-01 RX ADMIN — METHYLPREDNISOLONE SODIUM SUCCINATE 40 MG: 40 INJECTION, POWDER, FOR SOLUTION INTRAMUSCULAR; INTRAVENOUS at 06:12

## 2023-01-01 RX ADMIN — MILRINONE LACTATE IN DEXTROSE 0.13 MCG/KG/MIN: 200 INJECTION, SOLUTION INTRAVENOUS at 12:58

## 2023-01-01 RX ADMIN — MAGNESIUM SULFATE HEPTAHYDRATE 2 G: 40 INJECTION, SOLUTION INTRAVENOUS at 20:49

## 2023-01-01 RX ADMIN — CHLORHEXIDINE GLUCONATE 15 ML: 1.2 RINSE ORAL at 08:07

## 2023-01-01 RX ADMIN — CHLORHEXIDINE GLUCONATE 15 ML: 1.2 RINSE ORAL at 21:24

## 2023-01-01 RX ADMIN — LAMOTRIGINE 50 MG: 25 TABLET ORAL at 21:15

## 2023-01-01 RX ADMIN — HYDROXYCHLOROQUINE SULFATE 200 MG: 200 TABLET, FILM COATED ORAL at 09:44

## 2023-01-01 RX ADMIN — LORAZEPAM 0.5 MG: 2 INJECTION INTRAMUSCULAR; INTRAVENOUS at 12:42

## 2023-01-01 RX ADMIN — OXYCODONE HYDROCHLORIDE 5 MG: 5 SOLUTION ORAL at 10:43

## 2023-01-01 RX ADMIN — DEXTROSE MONOHYDRATE 13 ML: 25 INJECTION, SOLUTION INTRAVENOUS at 16:33

## 2023-01-01 RX ADMIN — IPRATROPIUM BROMIDE AND ALBUTEROL SULFATE 3 ML: 2.5; .5 SOLUTION RESPIRATORY (INHALATION) at 07:06

## 2023-01-01 RX ADMIN — SODIUM CHLORIDE 500 ML: 0.9 INJECTION, SOLUTION INTRAVENOUS at 09:50

## 2023-01-01 RX ADMIN — IPRATROPIUM BROMIDE AND ALBUTEROL SULFATE 3 ML: 2.5; .5 SOLUTION RESPIRATORY (INHALATION) at 07:11

## 2023-01-01 RX ADMIN — SODIUM CHLORIDE, SODIUM GLUCONATE, SODIUM ACETATE, POTASSIUM CHLORIDE, MAGNESIUM CHLORIDE, SODIUM PHOSPHATE, DIBASIC, AND POTASSIUM PHOSPHATE 50 ML/HR: .53; .5; .37; .037; .03; .012; .00082 INJECTION, SOLUTION INTRAVENOUS at 17:30

## 2023-01-01 RX ADMIN — APIXABAN 5 MG: 5 TABLET, FILM COATED ORAL at 20:21

## 2023-01-01 RX ADMIN — ALUMINUM HYDROXIDE, MAGNESIUM HYDROXIDE, AND DIMETHICONE 30 ML: 200; 20; 200 SUSPENSION ORAL at 03:46

## 2023-01-01 RX ADMIN — ACETAMINOPHEN 650 MG: 650 SUSPENSION ORAL at 12:29

## 2023-01-01 RX ADMIN — DULOXETINE 60 MG: 60 CAPSULE, DELAYED RELEASE ORAL at 17:09

## 2023-01-01 RX ADMIN — NOREPINEPHRINE BITARTRATE 15 MCG/MIN: 1 INJECTION, SOLUTION, CONCENTRATE INTRAVENOUS at 10:52

## 2023-01-01 RX ADMIN — DULOXETINE HYDROCHLORIDE 60 MG: 60 CAPSULE, DELAYED RELEASE ORAL at 17:20

## 2023-01-01 RX ADMIN — DULOXETINE 60 MG: 60 CAPSULE, DELAYED RELEASE ORAL at 08:21

## 2023-01-01 RX ADMIN — BUPROPION HYDROCHLORIDE 50 MG: 100 TABLET, FILM COATED ORAL at 17:32

## 2023-01-01 RX ADMIN — ACETAMINOPHEN 975 MG: 650 SUSPENSION ORAL at 01:02

## 2023-01-01 RX ADMIN — METOPROLOL SUCCINATE 25 MG: 25 TABLET, EXTENDED RELEASE ORAL at 08:50

## 2023-01-01 RX ADMIN — DEXTROSE MONOHYDRATE 25 ML: 25 INJECTION, SOLUTION INTRAVENOUS at 12:28

## 2023-01-01 RX ADMIN — Medication 20 MG: at 08:25

## 2023-01-01 RX ADMIN — HYDROXYCHLOROQUINE SULFATE 200 MG: 200 TABLET, FILM COATED ORAL at 08:05

## 2023-01-01 RX ADMIN — ALBUTEROL SULFATE 2 PUFF: 90 AEROSOL, METERED RESPIRATORY (INHALATION) at 03:49

## 2023-01-01 RX ADMIN — MORPHINE SULFATE 2 MG: 2 INJECTION, SOLUTION INTRAMUSCULAR; INTRAVENOUS at 21:49

## 2023-01-01 RX ADMIN — LAMOTRIGINE 50 MG: 100 TABLET ORAL at 21:12

## 2023-01-01 RX ADMIN — GLYCERIN, HYPROMELLOSE, POLYETHYLENE GLYCOL 1 DROP: .2; .2; 1 LIQUID OPHTHALMIC at 17:01

## 2023-01-01 RX ADMIN — GLYCERIN, HYPROMELLOSE, POLYETHYLENE GLYCOL 1 DROP: .2; .2; 1 LIQUID OPHTHALMIC at 21:37

## 2023-01-01 RX ADMIN — MIDODRINE HYDROCHLORIDE 5 MG: 5 TABLET ORAL at 02:34

## 2023-01-01 RX ADMIN — GLYCERIN, HYPROMELLOSE, POLYETHYLENE GLYCOL 1 DROP: .2; .2; 1 LIQUID OPHTHALMIC at 21:42

## 2023-01-01 RX ADMIN — Medication 12 MCG/MIN: at 19:49

## 2023-01-01 RX ADMIN — GABAPENTIN 100 MG: 250 SOLUTION ORAL at 22:13

## 2023-01-01 RX ADMIN — GABAPENTIN 100 MG: 250 SOLUTION ORAL at 21:36

## 2023-01-01 RX ADMIN — POTASSIUM CHLORIDE 20 MEQ: 14.9 INJECTION, SOLUTION INTRAVENOUS at 08:05

## 2023-01-01 RX ADMIN — BUPROPION HYDROCHLORIDE 50 MG: 100 TABLET, FILM COATED ORAL at 08:50

## 2023-01-01 RX ADMIN — DEXTROSE MONOHYDRATE 50 ML: 25 INJECTION, SOLUTION INTRAVENOUS at 00:04

## 2023-01-01 RX ADMIN — APIXABAN 5 MG: 5 TABLET, FILM COATED ORAL at 08:04

## 2023-01-01 RX ADMIN — Medication 10 MG/HR: at 11:44

## 2023-01-01 RX ADMIN — APIXABAN 5 MG: 5 TABLET, FILM COATED ORAL at 08:01

## 2023-01-01 RX ADMIN — VANCOMYCIN HYDROCHLORIDE 1500 MG: 1 INJECTION, POWDER, LYOPHILIZED, FOR SOLUTION INTRAVENOUS at 10:52

## 2023-01-01 RX ADMIN — IPRATROPIUM BROMIDE AND ALBUTEROL SULFATE 3 ML: 2.5; .5 SOLUTION RESPIRATORY (INHALATION) at 20:15

## 2023-01-01 RX ADMIN — BUPROPION HYDROCHLORIDE 50 MG: 100 TABLET, FILM COATED ORAL at 08:19

## 2023-01-01 RX ADMIN — OXYCODONE HYDROCHLORIDE 2.5 MG: 5 SOLUTION ORAL at 09:08

## 2023-01-01 RX ADMIN — FOMEPIZOLE 900 MG: 1 INJECTION, SOLUTION INTRAVENOUS at 21:12

## 2023-01-01 RX ADMIN — HYDROMORPHONE HYDROCHLORIDE 0.2 MG: 0.2 INJECTION, SOLUTION INTRAMUSCULAR; INTRAVENOUS; SUBCUTANEOUS at 17:54

## 2023-01-01 RX ADMIN — MORPHINE SULFATE 7.5 MG: 15 TABLET ORAL at 15:02

## 2023-01-01 RX ADMIN — APIXABAN 5 MG: 5 TABLET, FILM COATED ORAL at 08:16

## 2023-01-01 RX ADMIN — APIXABAN 5 MG: 5 TABLET, FILM COATED ORAL at 08:33

## 2023-01-01 RX ADMIN — POTASSIUM PHOSPHATE, MONOBASIC AND POTASSIUM PHOSPHATE, DIBASIC 30 MMOL: 224; 236 INJECTION, SOLUTION, CONCENTRATE INTRAVENOUS at 20:02

## 2023-01-01 RX ADMIN — LAMOTRIGINE 50 MG: 25 TABLET ORAL at 21:36

## 2023-01-01 RX ADMIN — HYDROXYCHLOROQUINE SULFATE 200 MG: 200 TABLET, FILM COATED ORAL at 17:09

## 2023-01-01 RX ADMIN — NOREPINEPHRINE BITARTRATE 2 MCG/MIN: 1 INJECTION, SOLUTION, CONCENTRATE INTRAVENOUS at 09:29

## 2023-01-01 RX ADMIN — ONDANSETRON 4 MG: 2 INJECTION INTRAMUSCULAR; INTRAVENOUS at 15:10

## 2023-01-01 RX ADMIN — Medication 40 MG: at 07:28

## 2023-01-01 RX ADMIN — DULOXETINE 60 MG: 60 CAPSULE, DELAYED RELEASE ORAL at 18:33

## 2023-01-01 RX ADMIN — VASOPRESSIN 0.04 UNITS/MIN: 20 INJECTION INTRAVENOUS at 15:34

## 2023-01-01 RX ADMIN — SODIUM CHLORIDE 75 ML/HR: 4 INJECTION, SOLUTION, CONCENTRATE INTRAVENOUS at 05:31

## 2023-01-01 RX ADMIN — IPRATROPIUM BROMIDE AND ALBUTEROL SULFATE 3 ML: 2.5; .5 SOLUTION RESPIRATORY (INHALATION) at 18:55

## 2023-01-01 RX ADMIN — METOPROLOL SUCCINATE 25 MG: 25 TABLET, EXTENDED RELEASE ORAL at 09:08

## 2023-01-01 RX ADMIN — BUPROPION HYDROCHLORIDE 50 MG: 100 TABLET, FILM COATED ORAL at 20:21

## 2023-01-01 RX ADMIN — APIXABAN 5 MG: 5 TABLET, FILM COATED ORAL at 17:02

## 2023-01-01 RX ADMIN — OXYCODONE HYDROCHLORIDE 2.5 MG: 5 SOLUTION ORAL at 17:51

## 2023-01-01 RX ADMIN — HYDROMORPHONE HYDROCHLORIDE 0.2 MG: 0.2 INJECTION, SOLUTION INTRAMUSCULAR; INTRAVENOUS; SUBCUTANEOUS at 12:05

## 2023-01-01 RX ADMIN — GABAPENTIN 300 MG: 250 SOLUTION ORAL at 09:44

## 2023-01-01 RX ADMIN — INSULIN LISPRO 4 UNITS: 100 INJECTION, SOLUTION INTRAVENOUS; SUBCUTANEOUS at 11:59

## 2023-01-01 RX ADMIN — PHYTONADIONE 10 MG: 10 INJECTION, EMULSION INTRAMUSCULAR; INTRAVENOUS; SUBCUTANEOUS at 01:00

## 2023-01-01 RX ADMIN — EPINEPHRINE 1 MG: 0.1 INJECTION INTRACARDIAC; INTRAVENOUS at 14:33

## 2023-01-01 RX ADMIN — HYDROXYCHLOROQUINE SULFATE 200 MG: 200 TABLET, FILM COATED ORAL at 08:13

## 2023-01-01 RX ADMIN — POTASSIUM CHLORIDE 20 MEQ: 14.9 INJECTION, SOLUTION INTRAVENOUS at 01:57

## 2023-01-01 RX ADMIN — Medication 12.5 MG: at 21:42

## 2023-01-01 RX ADMIN — LIDOCAINE 1 PATCH: 700 PATCH TOPICAL at 08:25

## 2023-01-01 RX ADMIN — IPRATROPIUM BROMIDE AND ALBUTEROL SULFATE 3 ML: 2.5; .5 SOLUTION RESPIRATORY (INHALATION) at 19:46

## 2023-01-01 RX ADMIN — DULOXETINE HYDROCHLORIDE 60 MG: 60 CAPSULE, DELAYED RELEASE ORAL at 09:08

## 2023-01-01 RX ADMIN — OXYCODONE HYDROCHLORIDE 5 MG: 5 SOLUTION ORAL at 18:11

## 2023-01-01 RX ADMIN — VASOPRESSIN 0.04 UNITS/MIN: 20 INJECTION INTRAVENOUS at 12:58

## 2023-01-01 RX ADMIN — CEFAZOLIN SODIUM 2000 MG: 2 SOLUTION INTRAVENOUS at 19:02

## 2023-01-01 RX ADMIN — BUPROPION HYDROCHLORIDE 50 MG: 100 TABLET, FILM COATED ORAL at 09:09

## 2023-01-01 RX ADMIN — HYDROXYCHLOROQUINE SULFATE 200 MG: 200 TABLET, FILM COATED ORAL at 11:00

## 2023-01-01 RX ADMIN — DEXTROSE MONOHYDRATE 25 ML: 25 INJECTION, SOLUTION INTRAVENOUS at 17:40

## 2023-01-01 RX ADMIN — PIPERACILLIN SODIUM AND TAZOBACTAM SODIUM 3.38 G: 36; 4.5 INJECTION, POWDER, LYOPHILIZED, FOR SOLUTION INTRAVENOUS at 02:24

## 2023-01-01 RX ADMIN — POTASSIUM CHLORIDE 40 MEQ: 1.5 SOLUTION ORAL at 08:07

## 2023-01-01 RX ADMIN — IPRATROPIUM BROMIDE AND ALBUTEROL SULFATE 3 ML: 2.5; .5 SOLUTION RESPIRATORY (INHALATION) at 01:57

## 2023-01-01 RX ADMIN — BUPROPION HYDROCHLORIDE 50 MG: 100 TABLET, FILM COATED ORAL at 17:01

## 2023-01-01 RX ADMIN — PIPERACILLIN SODIUM AND TAZOBACTAM SODIUM 3.38 G: 36; 4.5 INJECTION, POWDER, LYOPHILIZED, FOR SOLUTION INTRAVENOUS at 20:32

## 2023-01-01 RX ADMIN — Medication 40 MG/HR: at 01:19

## 2023-01-01 RX ADMIN — DEXTROSE MONOHYDRATE 25 ML: 25 INJECTION, SOLUTION INTRAVENOUS at 12:30

## 2023-01-01 RX ADMIN — SENNOSIDES 8.6 MG: 8.6 TABLET, FILM COATED ORAL at 17:20

## 2023-01-01 RX ADMIN — BISACODYL 10 MG: 10 SUPPOSITORY RECTAL at 08:07

## 2023-01-01 RX ADMIN — PHYTONADIONE 10 MG: 10 INJECTION, EMULSION INTRAMUSCULAR; INTRAVENOUS; SUBCUTANEOUS at 01:08

## 2023-01-01 RX ADMIN — Medication 5 MCG/MIN: at 10:09

## 2023-01-01 RX ADMIN — DEXTROSE MONOHYDRATE 50 ML: 25 INJECTION, SOLUTION INTRAVENOUS at 11:13

## 2023-01-01 RX ADMIN — Medication 12.5 MG: at 08:16

## 2023-01-01 RX ADMIN — MORPHINE SULFATE 2 MG: 2 INJECTION, SOLUTION INTRAMUSCULAR; INTRAVENOUS at 18:10

## 2023-01-01 RX ADMIN — LAMOTRIGINE 50 MG: 25 TABLET ORAL at 22:26

## 2023-01-01 RX ADMIN — POTASSIUM CHLORIDE 40 MEQ: 1.5 SOLUTION ORAL at 08:32

## 2023-01-01 RX ADMIN — BISACODYL 10 MG: 10 SUPPOSITORY RECTAL at 08:21

## 2023-01-01 RX ADMIN — METOPROLOL SUCCINATE 25 MG: 25 TABLET, EXTENDED RELEASE ORAL at 08:34

## 2023-01-01 RX ADMIN — DULOXETINE HYDROCHLORIDE 60 MG: 60 CAPSULE, DELAYED RELEASE ORAL at 17:10

## 2023-01-01 RX ADMIN — MORPHINE SULFATE 4 MG: 4 INJECTION INTRAVENOUS at 21:24

## 2023-01-01 RX ADMIN — HYDROMORPHONE HYDROCHLORIDE 0.2 MG: 0.2 INJECTION, SOLUTION INTRAMUSCULAR; INTRAVENOUS; SUBCUTANEOUS at 21:38

## 2023-01-01 RX ADMIN — LAMOTRIGINE 50 MG: 25 TABLET ORAL at 21:57

## 2023-01-01 RX ADMIN — GLYCERIN, HYPROMELLOSE, POLYETHYLENE GLYCOL 1 DROP: .2; .2; 1 LIQUID OPHTHALMIC at 08:29

## 2023-01-01 RX ADMIN — GABAPENTIN 100 MG: 250 SOLUTION ORAL at 21:55

## 2023-01-01 RX ADMIN — POTASSIUM CHLORIDE 20 MEQ: 14.9 INJECTION, SOLUTION INTRAVENOUS at 08:35

## 2023-01-01 RX ADMIN — BUPROPION HYDROCHLORIDE 50 MG: 100 TABLET, FILM COATED ORAL at 08:24

## 2023-01-01 RX ADMIN — DULOXETINE HYDROCHLORIDE 60 MG: 60 CAPSULE, DELAYED RELEASE ORAL at 08:33

## 2023-01-01 RX ADMIN — ALBUTEROL SULFATE 2 PUFF: 90 AEROSOL, METERED RESPIRATORY (INHALATION) at 17:48

## 2023-01-01 RX ADMIN — LAMOTRIGINE 50 MG: 25 TABLET ORAL at 22:55

## 2023-01-01 RX ADMIN — ALBUMIN (HUMAN) 50 G: 0.25 INJECTION, SOLUTION INTRAVENOUS at 22:05

## 2023-01-01 RX ADMIN — OXYCODONE HYDROCHLORIDE 5 MG: 5 SOLUTION ORAL at 02:34

## 2023-01-01 RX ADMIN — Medication 4 MCG/MIN: at 17:48

## 2023-01-01 RX ADMIN — APIXABAN 5 MG: 5 TABLET, FILM COATED ORAL at 08:34

## 2023-01-01 RX ADMIN — Medication 20 MG: at 10:24

## 2023-01-01 RX ADMIN — GLYCERIN, HYPROMELLOSE, POLYETHYLENE GLYCOL 1 DROP: .2; .2; 1 LIQUID OPHTHALMIC at 15:31

## 2023-01-01 RX ADMIN — IPRATROPIUM BROMIDE AND ALBUTEROL SULFATE 3 ML: 2.5; .5 SOLUTION RESPIRATORY (INHALATION) at 20:21

## 2023-01-01 RX ADMIN — HYDROMORPHONE HYDROCHLORIDE 0.2 MG: 0.2 INJECTION, SOLUTION INTRAMUSCULAR; INTRAVENOUS; SUBCUTANEOUS at 03:44

## 2023-01-01 RX ADMIN — LIDOCAINE 1 PATCH: 700 PATCH TOPICAL at 08:47

## 2023-01-01 RX ADMIN — MORPHINE SULFATE 2 MG: 2 INJECTION, SOLUTION INTRAMUSCULAR; INTRAVENOUS at 20:47

## 2023-01-01 RX ADMIN — ACYCLOVIR SODIUM 550 MG: 50 INJECTION, SOLUTION INTRAVENOUS at 18:10

## 2023-01-01 RX ADMIN — EPINEPHRINE 2 MCG/MIN: 1 INJECTION INTRAMUSCULAR; INTRAVENOUS; SUBCUTANEOUS at 21:57

## 2023-01-01 RX ADMIN — MAGNESIUM SULFATE HEPTAHYDRATE 2 G: 40 INJECTION, SOLUTION INTRAVENOUS at 19:42

## 2023-01-01 RX ADMIN — FUROSEMIDE 40 MG: 10 INJECTION, SOLUTION INTRAVENOUS at 19:48

## 2023-01-01 RX ADMIN — Medication 12.5 MG: at 09:01

## 2023-01-01 RX ADMIN — CALCIUM CHLORIDE 1 G: 100 INJECTION INTRAVENOUS; INTRAVENTRICULAR at 09:40

## 2023-01-01 RX ADMIN — HYDROMORPHONE HYDROCHLORIDE 0.2 MG: 0.2 INJECTION, SOLUTION INTRAMUSCULAR; INTRAVENOUS; SUBCUTANEOUS at 21:10

## 2023-01-01 RX ADMIN — MORPHINE SULFATE 2 MG: 2 INJECTION, SOLUTION INTRAMUSCULAR; INTRAVENOUS at 06:23

## 2023-01-01 RX ADMIN — ACETAMINOPHEN 650 MG: 650 SUSPENSION ORAL at 10:24

## 2023-01-01 RX ADMIN — POTASSIUM CHLORIDE 20 MEQ: 14.9 INJECTION, SOLUTION INTRAVENOUS at 09:08

## 2023-01-01 RX ADMIN — INSULIN LISPRO 1 UNITS: 100 INJECTION, SOLUTION INTRAVENOUS; SUBCUTANEOUS at 11:01

## 2023-01-01 RX ADMIN — PANTOPRAZOLE SODIUM 40 MG: 40 INJECTION, POWDER, FOR SOLUTION INTRAVENOUS at 08:24

## 2023-01-01 RX ADMIN — GLYCERIN, HYPROMELLOSE, POLYETHYLENE GLYCOL 1 DROP: .2; .2; 1 LIQUID OPHTHALMIC at 22:56

## 2023-01-01 RX ADMIN — POTASSIUM CHLORIDE 20 MEQ: 14.9 INJECTION, SOLUTION INTRAVENOUS at 12:58

## 2023-01-01 RX ADMIN — APIXABAN 5 MG: 5 TABLET, FILM COATED ORAL at 17:34

## 2023-01-01 RX ADMIN — ALBUTEROL SULFATE 2.5 MG: 2.5 SOLUTION RESPIRATORY (INHALATION) at 18:01

## 2023-01-01 RX ADMIN — IPRATROPIUM BROMIDE AND ALBUTEROL SULFATE 3 ML: 2.5; .5 SOLUTION RESPIRATORY (INHALATION) at 20:23

## 2023-01-01 RX ADMIN — LIDOCAINE 1 PATCH: 700 PATCH TOPICAL at 08:10

## 2023-01-01 RX ADMIN — MORPHINE SULFATE 2 MG: 2 INJECTION, SOLUTION INTRAMUSCULAR; INTRAVENOUS at 09:48

## 2023-01-01 RX ADMIN — PANTOPRAZOLE SODIUM 40 MG: 40 INJECTION, POWDER, FOR SOLUTION INTRAVENOUS at 08:05

## 2023-01-01 RX ADMIN — Medication 20 MG: at 08:01

## 2023-01-01 RX ADMIN — GLYCERIN, HYPROMELLOSE, POLYETHYLENE GLYCOL 1 DROP: .2; .2; 1 LIQUID OPHTHALMIC at 20:56

## 2023-01-01 RX ADMIN — SODIUM BICARBONATE 50 MEQ: 84 INJECTION, SOLUTION INTRAVENOUS at 09:27

## 2023-01-01 RX ADMIN — BUPROPION HYDROCHLORIDE 50 MG: 100 TABLET, FILM COATED ORAL at 08:03

## 2023-01-01 RX ADMIN — DULOXETINE 60 MG: 60 CAPSULE, DELAYED RELEASE ORAL at 09:20

## 2023-01-01 RX ADMIN — DULOXETINE 60 MG: 60 CAPSULE, DELAYED RELEASE ORAL at 17:32

## 2023-01-01 RX ADMIN — POTASSIUM CHLORIDE 40 MEQ: 1.5 SOLUTION ORAL at 09:08

## 2023-01-01 RX ADMIN — HYDROMORPHONE HYDROCHLORIDE 0.2 MG: 0.2 INJECTION, SOLUTION INTRAMUSCULAR; INTRAVENOUS; SUBCUTANEOUS at 14:29

## 2023-02-07 PROBLEM — E55.9 VITAMIN D INSUFFICIENCY: Status: ACTIVE | Noted: 2023-02-07

## 2023-02-07 PROBLEM — I42.8 NON-ISCHEMIC CARDIOMYOPATHY (HCC): Status: ACTIVE | Noted: 2023-02-07

## 2023-02-12 ENCOUNTER — ANESTHESIA (OUTPATIENT)
Dept: RADIOLOGY | Facility: HOSPITAL | Age: 68
End: 2023-02-12

## 2023-02-12 ENCOUNTER — HOSPITAL ENCOUNTER (INPATIENT)
Dept: RADIOLOGY | Facility: HOSPITAL | Age: 68
LOS: 4 days | End: 2023-02-16
Attending: NEUROLOGICAL SURGERY | Admitting: EMERGENCY MEDICINE

## 2023-02-12 ENCOUNTER — APPOINTMENT (EMERGENCY)
Dept: CT IMAGING | Facility: HOSPITAL | Age: 68
End: 2023-02-12

## 2023-02-12 ENCOUNTER — ANESTHESIA EVENT (OUTPATIENT)
Dept: RADIOLOGY | Facility: HOSPITAL | Age: 68
End: 2023-02-12

## 2023-02-12 ENCOUNTER — HOSPITAL ENCOUNTER (EMERGENCY)
Facility: HOSPITAL | Age: 68
End: 2023-02-12
Attending: EMERGENCY MEDICINE

## 2023-02-12 ENCOUNTER — APPOINTMENT (OUTPATIENT)
Dept: RADIOLOGY | Facility: HOSPITAL | Age: 68
End: 2023-02-12

## 2023-02-12 ENCOUNTER — APPOINTMENT (INPATIENT)
Dept: NON INVASIVE DIAGNOSTICS | Facility: HOSPITAL | Age: 68
End: 2023-02-12

## 2023-02-12 VITALS
BODY MASS INDEX: 23.45 KG/M2 | RESPIRATION RATE: 16 BRPM | SYSTOLIC BLOOD PRESSURE: 141 MMHG | DIASTOLIC BLOOD PRESSURE: 79 MMHG | WEIGHT: 149.69 LBS | HEART RATE: 77 BPM | OXYGEN SATURATION: 95 % | TEMPERATURE: 98.1 F

## 2023-02-12 DIAGNOSIS — I63.9 STROKE (HCC): ICD-10-CM

## 2023-02-12 DIAGNOSIS — R29.90 STROKE-LIKE EPISODE: Primary | ICD-10-CM

## 2023-02-12 DIAGNOSIS — I63.9 CEREBROVASCULAR ACCIDENT (CVA), UNSPECIFIED MECHANISM (HCC): ICD-10-CM

## 2023-02-12 DIAGNOSIS — G89.4 CHRONIC PAIN SYNDROME: ICD-10-CM

## 2023-02-12 DIAGNOSIS — I50.20 SYSTOLIC HEART FAILURE, UNSPECIFIED HF CHRONICITY (HCC): Primary | ICD-10-CM

## 2023-02-12 PROBLEM — I10 HTN (HYPERTENSION): Status: ACTIVE | Noted: 2023-02-12

## 2023-02-12 LAB
ANION GAP SERPL CALCULATED.3IONS-SCNC: 4 MMOL/L (ref 4–13)
AORTIC ROOT: 3.6 CM
APICAL FOUR CHAMBER EJECTION FRACTION: 28 %
APTT PPP: 24 SECONDS (ref 23–37)
AV REGURGITATION PRESSURE HALF TIME: 343 MS
BUN SERPL-MCNC: 13 MG/DL (ref 5–25)
CALCIUM SERPL-MCNC: 10.8 MG/DL (ref 8.4–10.2)
CARDIAC TROPONIN I PNL SERPL HS: 12 NG/L
CHLORIDE SERPL-SCNC: 109 MMOL/L (ref 96–108)
CO2 SERPL-SCNC: 28 MMOL/L (ref 21–32)
CREAT SERPL-MCNC: 0.94 MG/DL (ref 0.6–1.3)
E WAVE DECELERATION TIME: 72 MS
ERYTHROCYTE [DISTWIDTH] IN BLOOD BY AUTOMATED COUNT: 13.8 % (ref 11.6–15.1)
FLUAV RNA RESP QL NAA+PROBE: NEGATIVE
FLUBV RNA RESP QL NAA+PROBE: NEGATIVE
FRACTIONAL SHORTENING: 17 % (ref 28–44)
GFR SERPL CREATININE-BSD FRML MDRD: 62 ML/MIN/1.73SQ M
GLUCOSE SERPL-MCNC: 113 MG/DL (ref 65–140)
GLUCOSE SERPL-MCNC: 98 MG/DL (ref 65–140)
HCT VFR BLD AUTO: 44.5 % (ref 34.8–46.1)
HGB BLD-MCNC: 14.5 G/DL (ref 11.5–15.4)
INR PPP: 0.9 (ref 0.84–1.19)
INTERVENTRICULAR SEPTUM IN DIASTOLE (PARASTERNAL SHORT AXIS VIEW): 1.1 CM
INTERVENTRICULAR SEPTUM: 1.1 CM (ref 0.6–1.1)
LAAS-AP2: 20.3 CM2
LAAS-AP4: 23.4 CM2
LEFT ATRIUM SIZE: 3.5 CM
LEFT INTERNAL DIMENSION IN SYSTOLE: 4.3 CM (ref 2.1–4)
LEFT VENTRICLE DIASTOLIC VOLUME (MOD BIPLANE): 119 ML
LEFT VENTRICLE SYSTOLIC VOLUME (MOD BIPLANE): 85 ML
LEFT VENTRICULAR INTERNAL DIMENSION IN DIASTOLE: 5.2 CM (ref 3.5–6)
LEFT VENTRICULAR POSTERIOR WALL IN END DIASTOLE: 1 CM
LEFT VENTRICULAR STROKE VOLUME: 43 ML
LV EF: 29 %
LVSV (TEICH): 43 ML
MCH RBC QN AUTO: 31.1 PG (ref 26.8–34.3)
MCHC RBC AUTO-ENTMCNC: 32.6 G/DL (ref 31.4–37.4)
MCV RBC AUTO: 96 FL (ref 82–98)
MV E'TISSUE VEL-SEP: 6 CM/S
MV PEAK E VEL: 96 CM/S
MV STENOSIS PRESSURE HALF TIME: 21 MS
MV VALVE AREA P 1/2 METHOD: 10.48 CM2
PLATELET # BLD AUTO: 216 THOUSANDS/UL (ref 149–390)
PMV BLD AUTO: 9.8 FL (ref 8.9–12.7)
POTASSIUM SERPL-SCNC: 3.9 MMOL/L (ref 3.5–5.3)
PROTHROMBIN TIME: 12.1 SECONDS (ref 11.6–14.5)
RA PRESSURE ESTIMATED: 3 MMHG
RBC # BLD AUTO: 4.66 MILLION/UL (ref 3.81–5.12)
RIGHT ATRIUM AREA SYSTOLE A4C: 9.9 CM2
RIGHT VENTRICLE ID DIMENSION: 3 CM
RSV RNA RESP QL NAA+PROBE: NEGATIVE
SARS-COV-2 RNA RESP QL NAA+PROBE: NEGATIVE
SL CV AV DECELERATION TIME RETROGRADE: 1183 MS
SL CV AV PEAK GRADIENT RETROGRADE: 65 MMHG
SL CV LEFT ATRIUM LENGTH A2C: 5.3 CM
SL CV LV EF: 25
SL CV PED ECHO LEFT VENTRICLE DIASTOLIC VOLUME (MOD BIPLANE) 2D: 128 ML
SL CV PED ECHO LEFT VENTRICLE SYSTOLIC VOLUME (MOD BIPLANE) 2D: 85 ML
SODIUM SERPL-SCNC: 141 MMOL/L (ref 135–147)
TRICUSPID ANNULAR PLANE SYSTOLIC EXCURSION: 1.7 CM
WBC # BLD AUTO: 6.86 THOUSAND/UL (ref 4.31–10.16)

## 2023-02-12 PROCEDURE — B313YZZ FLUOROSCOPY OF RIGHT COMMON CAROTID ARTERY USING OTHER CONTRAST: ICD-10-PCS | Performed by: NEUROLOGICAL SURGERY

## 2023-02-12 PROCEDURE — B316YZZ FLUOROSCOPY OF RIGHT INTERNAL CAROTID ARTERY USING OTHER CONTRAST: ICD-10-PCS | Performed by: NEUROLOGICAL SURGERY

## 2023-02-12 PROCEDURE — B31RYZZ FLUOROSCOPY OF INTRACRANIAL ARTERIES USING OTHER CONTRAST: ICD-10-PCS | Performed by: NEUROLOGICAL SURGERY

## 2023-02-12 PROCEDURE — 03CG3Z7 EXTIRPATION OF MATTER FROM INTRACRANIAL ARTERY USING STENT RETRIEVER, PERCUTANEOUS APPROACH: ICD-10-PCS | Performed by: NEUROLOGICAL SURGERY

## 2023-02-12 PROCEDURE — B41FYZZ FLUOROSCOPY OF RIGHT LOWER EXTREMITY ARTERIES USING OTHER CONTRAST: ICD-10-PCS | Performed by: NEUROLOGICAL SURGERY

## 2023-02-12 RX ORDER — BUPROPION HYDROCHLORIDE 100 MG/1
100 TABLET ORAL 2 TIMES DAILY
Status: CANCELLED | OUTPATIENT
Start: 2023-02-12

## 2023-02-12 RX ORDER — HYDROXYCHLOROQUINE SULFATE 200 MG/1
200 TABLET, FILM COATED ORAL
Status: DISCONTINUED | OUTPATIENT
Start: 2023-02-13 | End: 2023-02-16 | Stop reason: HOSPADM

## 2023-02-12 RX ORDER — FENTANYL CITRATE 50 UG/ML
INJECTION, SOLUTION INTRAMUSCULAR; INTRAVENOUS AS NEEDED
Status: DISCONTINUED | OUTPATIENT
Start: 2023-02-12 | End: 2023-02-12

## 2023-02-12 RX ORDER — OXYCODONE HYDROCHLORIDE 5 MG/1
2.5 TABLET ORAL EVERY 6 HOURS PRN
Status: CANCELLED | OUTPATIENT
Start: 2023-02-12

## 2023-02-12 RX ORDER — GABAPENTIN 300 MG/1
300 CAPSULE ORAL 2 TIMES DAILY
Status: DISCONTINUED | OUTPATIENT
Start: 2023-02-12 | End: 2023-02-16 | Stop reason: HOSPADM

## 2023-02-12 RX ORDER — ACETAMINOPHEN 325 MG/1
650 TABLET ORAL EVERY 6 HOURS PRN
Status: CANCELLED | OUTPATIENT
Start: 2023-02-12

## 2023-02-12 RX ORDER — OXYCODONE HYDROCHLORIDE 5 MG/1
5 TABLET ORAL EVERY 6 HOURS PRN
Status: CANCELLED | OUTPATIENT
Start: 2023-02-12

## 2023-02-12 RX ORDER — POLYETHYLENE GLYCOL 3350 17 G/17G
17 POWDER, FOR SOLUTION ORAL DAILY PRN
Status: CANCELLED | OUTPATIENT
Start: 2023-02-12

## 2023-02-12 RX ORDER — DULOXETIN HYDROCHLORIDE 60 MG/1
60 CAPSULE, DELAYED RELEASE ORAL 2 TIMES DAILY
Status: CANCELLED | OUTPATIENT
Start: 2023-02-12

## 2023-02-12 RX ORDER — ENOXAPARIN SODIUM 100 MG/ML
40 INJECTION SUBCUTANEOUS
Status: DISCONTINUED | OUTPATIENT
Start: 2023-02-12 | End: 2023-02-16 | Stop reason: HOSPADM

## 2023-02-12 RX ORDER — HYDRALAZINE HYDROCHLORIDE 20 MG/ML
5 INJECTION INTRAMUSCULAR; INTRAVENOUS EVERY 6 HOURS PRN
Status: CANCELLED | OUTPATIENT
Start: 2023-02-12

## 2023-02-12 RX ORDER — PANTOPRAZOLE SODIUM 40 MG/1
40 TABLET, DELAYED RELEASE ORAL
Status: CANCELLED | OUTPATIENT
Start: 2023-02-12

## 2023-02-12 RX ORDER — ATORVASTATIN CALCIUM 40 MG/1
40 TABLET, FILM COATED ORAL EVERY EVENING
Status: DISCONTINUED | OUTPATIENT
Start: 2023-02-12 | End: 2023-02-16 | Stop reason: HOSPADM

## 2023-02-12 RX ORDER — DIPHENHYDRAMINE HYDROCHLORIDE 50 MG/ML
25 INJECTION INTRAMUSCULAR; INTRAVENOUS ONCE
Status: COMPLETED | OUTPATIENT
Start: 2023-02-12 | End: 2023-02-12

## 2023-02-12 RX ORDER — HEPARIN SODIUM 1000 [USP'U]/ML
INJECTION, SOLUTION INTRAVENOUS; SUBCUTANEOUS AS NEEDED
Status: COMPLETED | OUTPATIENT
Start: 2023-02-12 | End: 2023-02-12

## 2023-02-12 RX ORDER — AMOXICILLIN 250 MG
1 CAPSULE ORAL
Status: CANCELLED | OUTPATIENT
Start: 2023-02-12

## 2023-02-12 RX ORDER — LIDOCAINE HYDROCHLORIDE 10 MG/ML
INJECTION, SOLUTION EPIDURAL; INFILTRATION; INTRACAUDAL; PERINEURAL AS NEEDED
Status: COMPLETED | OUTPATIENT
Start: 2023-02-12 | End: 2023-02-12

## 2023-02-12 RX ORDER — VERAPAMIL HYDROCHLORIDE 2.5 MG/ML
INJECTION, SOLUTION INTRAVENOUS AS NEEDED
Status: COMPLETED | OUTPATIENT
Start: 2023-02-12 | End: 2023-02-12

## 2023-02-12 RX ORDER — SODIUM CHLORIDE 9 MG/ML
INJECTION, SOLUTION INTRAVENOUS CONTINUOUS PRN
Status: DISCONTINUED | OUTPATIENT
Start: 2023-02-12 | End: 2023-02-12

## 2023-02-12 RX ORDER — LABETALOL HYDROCHLORIDE 5 MG/ML
INJECTION, SOLUTION INTRAVENOUS
Status: DISCONTINUED
Start: 2023-02-12 | End: 2023-02-12 | Stop reason: WASHOUT

## 2023-02-12 RX ORDER — LANOLIN ALCOHOL/MO/W.PET/CERES
6 CREAM (GRAM) TOPICAL
Status: DISCONTINUED | OUTPATIENT
Start: 2023-02-12 | End: 2023-02-16 | Stop reason: HOSPADM

## 2023-02-12 RX ORDER — OXYCODONE HYDROCHLORIDE 5 MG/1
2.5 TABLET ORAL EVERY 6 HOURS PRN
Status: DISCONTINUED | OUTPATIENT
Start: 2023-02-12 | End: 2023-02-16 | Stop reason: HOSPADM

## 2023-02-12 RX ORDER — NITROGLYCERIN 20 MG/100ML
INJECTION INTRAVENOUS AS NEEDED
Status: COMPLETED | OUTPATIENT
Start: 2023-02-12 | End: 2023-02-12

## 2023-02-12 RX ORDER — OXYCODONE HYDROCHLORIDE 5 MG/1
5 TABLET ORAL EVERY 6 HOURS PRN
Status: DISCONTINUED | OUTPATIENT
Start: 2023-02-12 | End: 2023-02-16 | Stop reason: HOSPADM

## 2023-02-12 RX ORDER — HYDRALAZINE HYDROCHLORIDE 20 MG/ML
10 INJECTION INTRAMUSCULAR; INTRAVENOUS EVERY 6 HOURS PRN
Status: DISCONTINUED | OUTPATIENT
Start: 2023-02-12 | End: 2023-02-16 | Stop reason: HOSPADM

## 2023-02-12 RX ORDER — CHLORHEXIDINE GLUCONATE 0.12 MG/ML
15 RINSE ORAL EVERY 12 HOURS SCHEDULED
Status: DISCONTINUED | OUTPATIENT
Start: 2023-02-12 | End: 2023-02-13

## 2023-02-12 RX ORDER — HYDROMORPHONE HCL IN WATER/PF 6 MG/30 ML
0.2 PATIENT CONTROLLED ANALGESIA SYRINGE INTRAVENOUS ONCE
Status: COMPLETED | OUTPATIENT
Start: 2023-02-12 | End: 2023-02-12

## 2023-02-12 RX ORDER — BUPROPION HYDROCHLORIDE 100 MG/1
100 TABLET ORAL 2 TIMES DAILY
Status: DISCONTINUED | OUTPATIENT
Start: 2023-02-12 | End: 2023-02-16 | Stop reason: HOSPADM

## 2023-02-12 RX ORDER — LORAZEPAM 2 MG/ML
0.5 INJECTION INTRAMUSCULAR ONCE
Status: DISCONTINUED | OUTPATIENT
Start: 2023-02-12 | End: 2023-02-12

## 2023-02-12 RX ORDER — ACETAMINOPHEN 325 MG/1
975 TABLET ORAL EVERY 6 HOURS PRN
Status: DISCONTINUED | OUTPATIENT
Start: 2023-02-12 | End: 2023-02-16 | Stop reason: HOSPADM

## 2023-02-12 RX ORDER — GABAPENTIN 300 MG/1
300 CAPSULE ORAL 2 TIMES DAILY
Status: CANCELLED | OUTPATIENT
Start: 2023-02-12

## 2023-02-12 RX ORDER — IODIXANOL 320 MG/ML
400 INJECTION, SOLUTION INTRAVASCULAR
Status: COMPLETED | OUTPATIENT
Start: 2023-02-12 | End: 2023-02-12

## 2023-02-12 RX ORDER — DULOXETIN HYDROCHLORIDE 60 MG/1
60 CAPSULE, DELAYED RELEASE ORAL 2 TIMES DAILY
Status: DISCONTINUED | OUTPATIENT
Start: 2023-02-12 | End: 2023-02-16 | Stop reason: HOSPADM

## 2023-02-12 RX ORDER — SODIUM CHLORIDE 9 MG/ML
75 INJECTION, SOLUTION INTRAVENOUS CONTINUOUS
Status: DISCONTINUED | OUTPATIENT
Start: 2023-02-12 | End: 2023-02-13

## 2023-02-12 RX ORDER — HYDROXYCHLOROQUINE SULFATE 200 MG/1
400 TABLET, FILM COATED ORAL
Status: DISCONTINUED | OUTPATIENT
Start: 2023-02-18 | End: 2023-02-16 | Stop reason: HOSPADM

## 2023-02-12 RX ORDER — EPHEDRINE SULFATE 50 MG/ML
INJECTION INTRAVENOUS AS NEEDED
Status: DISCONTINUED | OUTPATIENT
Start: 2023-02-12 | End: 2023-02-12

## 2023-02-12 RX ADMIN — OXYCODONE HYDROCHLORIDE 5 MG: 5 TABLET ORAL at 23:27

## 2023-02-12 RX ADMIN — CHLORHEXIDINE GLUCONATE 0.12% ORAL RINSE 15 ML: 1.2 LIQUID ORAL at 21:08

## 2023-02-12 RX ADMIN — BUPROPION HYDROCHLORIDE 100 MG: 100 TABLET, FILM COATED ORAL at 17:03

## 2023-02-12 RX ADMIN — DULOXETINE HYDROCHLORIDE 60 MG: 60 CAPSULE, DELAYED RELEASE ORAL at 17:03

## 2023-02-12 RX ADMIN — NITROGLYCERIN 600 MCG: 20 INJECTION INTRAVENOUS at 05:27

## 2023-02-12 RX ADMIN — SODIUM CHLORIDE 125 ML/HR: 0.9 INJECTION, SOLUTION INTRAVENOUS at 11:31

## 2023-02-12 RX ADMIN — VERAPAMIL HYDROCHLORIDE 5 MG: 2.5 INJECTION INTRAVENOUS at 05:30

## 2023-02-12 RX ADMIN — NITROGLYCERIN 400 MCG: 20 INJECTION INTRAVENOUS at 05:30

## 2023-02-12 RX ADMIN — LIDOCAINE HYDROCHLORIDE 10 ML: 10 INJECTION, SOLUTION EPIDURAL; INFILTRATION; INTRACAUDAL; PERINEURAL at 05:10

## 2023-02-12 RX ADMIN — FENTANYL CITRATE 25 MCG: 50 INJECTION, SOLUTION INTRAMUSCULAR; INTRAVENOUS at 05:17

## 2023-02-12 RX ADMIN — ACETAMINOPHEN 975 MG: 325 TABLET, FILM COATED ORAL at 16:54

## 2023-02-12 RX ADMIN — FENTANYL CITRATE 25 MCG: 50 INJECTION, SOLUTION INTRAMUSCULAR; INTRAVENOUS at 05:18

## 2023-02-12 RX ADMIN — IODIXANOL 200 ML: 320 INJECTION, SOLUTION INTRAVASCULAR at 06:54

## 2023-02-12 RX ADMIN — LAMOTRIGINE 150 MG: 100 TABLET ORAL at 21:08

## 2023-02-12 RX ADMIN — OXYCODONE HYDROCHLORIDE 5 MG: 5 TABLET ORAL at 16:53

## 2023-02-12 RX ADMIN — SODIUM CHLORIDE: 0.9 INJECTION, SOLUTION INTRAVENOUS at 05:03

## 2023-02-12 RX ADMIN — PHENYLEPHRINE HYDROCHLORIDE 80 MCG/MIN: 10 INJECTION INTRAVENOUS at 05:06

## 2023-02-12 RX ADMIN — DIPHENHYDRAMINE HYDROCHLORIDE 25 MG: 50 INJECTION, SOLUTION INTRAMUSCULAR; INTRAVENOUS at 03:14

## 2023-02-12 RX ADMIN — PHENYLEPHRINE HYDROCHLORIDE 25 MCG/MIN: 10 INJECTION INTRAVENOUS at 04:20

## 2023-02-12 RX ADMIN — EPHEDRINE SULFATE 5 MG: 50 INJECTION INTRAVENOUS at 05:31

## 2023-02-12 RX ADMIN — VERAPAMIL HYDROCHLORIDE 5 MG: 2.5 INJECTION INTRAVENOUS at 06:27

## 2023-02-12 RX ADMIN — LIDOCAINE HYDROCHLORIDE 3 ML: 10 INJECTION, SOLUTION EPIDURAL; INFILTRATION; INTRACAUDAL; PERINEURAL at 05:27

## 2023-02-12 RX ADMIN — GABAPENTIN 300 MG: 300 CAPSULE ORAL at 16:54

## 2023-02-12 RX ADMIN — HYDROMORPHONE HYDROCHLORIDE 0.2 MG: 0.2 INJECTION, SOLUTION INTRAMUSCULAR; INTRAVENOUS; SUBCUTANEOUS at 11:31

## 2023-02-12 RX ADMIN — NITROGLYCERIN 400 MCG: 20 INJECTION INTRAVENOUS at 06:27

## 2023-02-12 RX ADMIN — HEPARIN SODIUM 4000 UNITS: 1000 INJECTION INTRAVENOUS; SUBCUTANEOUS at 05:30

## 2023-02-12 RX ADMIN — ATORVASTATIN CALCIUM 40 MG: 40 TABLET, FILM COATED ORAL at 17:03

## 2023-02-12 RX ADMIN — CHLORHEXIDINE GLUCONATE 0.12% ORAL RINSE 15 ML: 1.2 LIQUID ORAL at 09:00

## 2023-02-12 RX ADMIN — IOHEXOL 100 ML: 350 INJECTION, SOLUTION INTRAVENOUS at 03:40

## 2023-02-12 RX ADMIN — LIDOCAINE HYDROCHLORIDE 1 ML: 10 INJECTION, SOLUTION EPIDURAL; INFILTRATION; INTRACAUDAL; PERINEURAL at 05:30

## 2023-02-12 RX ADMIN — ENOXAPARIN SODIUM 40 MG: 40 INJECTION SUBCUTANEOUS at 12:00

## 2023-02-12 NOTE — PROGRESS NOTES
[facility]  Progress Note - Bedelia Juani 1955, 75 y o  female MRN: [id number]  Unit/Bed#: ICU 06 Encounter: [id number]  Primary Care Provider: Chapis Young DO   Date and time admitted to hospital: 2/12/2023  5:00 AM    * Stroke [facility])  Assessment & Plan  PPD#0 TICI 3 revascularization of a right m1 occlusion (Dr Saturnino Herbert 2/12/2023)  · Presented with right MCA syndrome, NIH 13    Imaging reviewed personally and with attending, results are as follows:  · CTA stroke alert 2/12/2023: Abrupt cut off of flow at the distal right MCA M1 segment compatible with an acute infarct  Collateralized blood flow is noted within the distal right MCA branches  3 mm saccular aneurysm arising from the medial aspect of the proximal right ICA supraclinoid segment  No hemodynamically significant stenosis within either common or internal carotid artery  Less than 50% stenosis by NASCET criteria  Plan:  • Continue to monitor neurological exam, GCS 15, dysarthric speech, left arm more than leg weakness and ataxia  • Repeat CT head in 24 hours for stability vs CT head stat if GCS declines more than 2 points in 1 hour  • MRI brain ordered and pending  • Neurology following for stroke management, on stroke pathway  • Systolic blood pressure less than 120, MAP greater than 65  • Medical management and pain control per primary team  • DVT ppx:  SCDs, Lovenox  • Mobilize as tolerated with assistance, PT / OT /speech evaluation evaluation    Neurosurgery will continue to follow  Please call with questions or concerns  Subjective/Objective   Chief Complaint: Follow-up thrombectomy    Subjective: Patient is working with speech therapy currently  She continues with dysarthric speech and states that she is embarrassed by this  She continues with left-sided weakness mostly in the arm  Her groin is sore from the procedure  At baseline she has dysphagia      Objective: Sitting up in bed, working with speech, NAD    I/O     None          Invasive Devices     Peripheral Intravenous Line  Duration           Peripheral IV 02/12/23 Right Antecubital <1 day                Physical Exam:  Vitals: Blood pressure 112/72, pulse 98, temperature 98 1 °F (36 7 °C), temperature source Rectal, resp  rate 18, height 5' 3" (1 6 m), weight 65 4 kg (144 lb 2 9 oz), SpO2 96 %  ,Body mass index is 25 54 kg/m²  General appearance: alert, appears stated age, cooperative and no distress  Head: Normocephalic, without obvious abnormality, atraumatic  Eyes: Seems to be tracking appropriately and crossing midline  Neck: supple, symmetrical, trachea midline   Lungs: non labored breathing  Heart: regular heart rate  Neurologic:   Mental status: Alert, oriented x3, speech is dysarthric but able to answer questions appropriately, follows commands, thought content appropriate  Cranial nerves: grossly intact (Cranial nerves II-XII)  Motor: moving all extremities with left arm more than left leg weakness and ataxia   RUE / RLE:  5/5 throughout   LUE:  4-/5 throughout   LLE:  4/5 throughout  Coordination: Left-sided drift, finger-to-nose abnormal on the left secondary to dysmetria and ataxia      Lab Results:  Results from last 7 days   Lab Units 02/12/23  0302   WBC Thousand/uL 6 86   HEMOGLOBIN g/dL 14 5   HEMATOCRIT % 44 5   PLATELETS Thousands/uL 216     Results from last 7 days   Lab Units 02/12/23  0302   POTASSIUM mmol/L 3 9   CHLORIDE mmol/L 109*   CO2 mmol/L 28   BUN mg/dL 13   CREATININE mg/dL 0 94   CALCIUM mg/dL 10 8*             Results from last 7 days   Lab Units 02/12/23  0302   INR  0 90   PTT seconds 24     Imaging Studies: I have personally reviewed pertinent reports  and I have personally reviewed pertinent films in PACS    CT chest abdomen pelvis w contrast    Result Date: 2/12/2023  Impression: No evidence of solid organ injury  No acute intra-abdominal abnormality  No free air or free fluid   Bibasilar atelectasis with no infiltrate or consolidation  No pneumothorax  Stable chronic right posterior 10th, 11th and 12th rib fractures  Stable chronic compression fractures at T11, L1 and L4  No new fracture  Workstation performed: [id number]     CT stroke alert brain    Result Date: 2/12/2023  Impression: Area of loss of the gray-white matter differentiation involving the right parietotemporal region suspicious for acute ischemia  Occlusion of the distal right MCA M1 segment is also noted on a CTA head and neck performed at same time, compatible with an acute infarct  An MRI brain could be performed for further evaluation  No acute intracranial hemorrhage  Mild chronic small vessel ischemic changes  I personally discussed this study with Sachi Samson on 2/12/2023 at 3:20 AM  Workstation performed: [id number]     CT recon only thoracolumbar    Result Date: 2/12/2023  Impression: No fracture or traumatic subluxation  Stable chronic compression fractures at T11, L1 and L4  Stable old right transverse process fractures at L2 and L3  Workstation performed: [id number]     CTA stroke alert (head/neck)    Result Date: 2/12/2023  Impression: Abrupt cut off of flow at the distal right MCA M1 segment compatible with an acute infarct  Collateralized blood flow is noted within the distal right MCA branches  3 mm saccular aneurysm arising from the medial aspect of the proximal right ICA supraclinoid segment  No hemodynamically significant stenosis within either common or internal carotid artery  Less than 50% stenosis by NASCET criteria  I personally discussed this study with Sachi Samson on 2/12/2023 at 3:20 AM  Workstation performed: [id number]       EKG, Pathology, and Other Studies: I have personally reviewed pertinent reports        VTE Pharmacologic Prophylaxis: Enoxaparin (Lovenox)    VTE Mechanical Prophylaxis: sequential compression device

## 2023-02-12 NOTE — ANESTHESIA POSTPROCEDURE EVALUATION
Post-Op Assessment Note    CV Status:  Stable    Pain management: adequate     Mental Status:  Awake   Hydration Status:  Stable   PONV Controlled:  None   Airway Patency:  Patent      Post Op Vitals Reviewed: Yes      Staff: Anesthesiologist, CRNA   Comments: awake and responding, VSS off phenylpherine, report given toRN        No notable events documented      BP   120/60   Temp      Pulse  78   Resp   14   SpO2   98

## 2023-02-12 NOTE — QUICK NOTE
Patient's daughter, Natalie Devine, called and updated regarding patient's current clinical status and plans of care  Questions answered and concerns addressed  Daughter appreciative of the call       Veda Melton DO, PGY-3  [facility] Internal Medicine Residency

## 2023-02-12 NOTE — H&P
2H&P Exam - [address] [place] [address] 75 y o  female MRN: [id number]  Unit/Bed#: ICU 06 Encounter: [id number]      -------------------------------------------------------------------------------------------------------------  Chief Complaint: Stroke Like Symptoms    History of Present Illness   HX and PE limited by: None  Sydney Lopez is a 75 y o  female with a PMHx of chronic pain disorder, bipolar, anxiety/depression, asthma, SHAR, seronegative arthropathy, and undifferentiated CTD who initially presented to [facility] due to acute onset of left sided weakness, slurred speech, and ataxia  Symptoms led to patient tripping and falling  Patient's brother initially called EMS due to concern for potential overdose  Last known normal 6PM on 2/11/2023  NIH 13 on arrival  Stroke Alert called at 2:55 AM  CT head completed and revealed area of loss of the gray-white matter differentiation involving the right parietotemporal region suspicious for acute ischemia  CTA head/neck confirmed a distal right MCA M1 segment with abrupt cut off and collateralized blood flow tihin the distal right MCA branches  Endovascular alert activated at 4:10 AM  No TNK administered as patient was out of administration window  Patient transferred to [facility] for CT perfusion scan and possible thrombectomy  Perfusion scan deferred on arrival  Patient now presents to the neuro ICU for continued monitoring following angiogram resulting in TICI 3 revascularization of right M1 occlusion       History obtained from chart review and the patient   -------------------------------------------------------------------------------------------------------------  Assessment and Plan:    Neuro:   • Diagnosis: Acute Right MCA Syndrome 2/2 M1 Occlusion   o [place] [facility] [place] with left sided weakness, slurred speech, and ataxia  o LKN 6 PM 2/11/2023 - NIH 13 on arrival  o CT Stroke Alert:  "Area of loss of the gray-white matter differentiation involving the right parietotemporal region suspicious for acute ischemia  Occlusion of the distal right MCA M1 segment is also noted on a CTA head and neck performed at same time, compatible with an acute infarct  An MRI brain could be performed for further evaluation  No acute intracranial hemorrhage "  o CTA Head/neck 2/12: "Abrupt cut off of flow at the distal right MCA M1 segment compatible with an acute infarct  Collateralized blood flow is noted within the distal right MCA branches  3 mm saccular aneurysm arising from the medial aspect of the proximal right ICA supraclinoid segment "  o Outside TNK window; endovascular alert at 4:10 AM; [facility]  o S/P angiogram with thrombectomy - TICI 3 revascularization   o Plan:  - Begin on Lipitor 40 mg daily   - Hold ASA results of 24 hour CT Head; okay for DVT ppx   - MRI brain ordered, 24 hour CT head  - Echo ordered; telemetry monitoring   - q1 hour neuro checks, STAT CT if change  - SBP goal of <160 mmHg; PRN ordered  - Hemoglobin A1c and Lipid panel ordered to follow up   - Neurology and neurosurgery consulted   - PT/OT/Speech Therapy consulted   - PMR consultation placed  • Diagnosis: Chronic Pain Disorder  o History of rib and compression fractures  o CT CAP 2/11 with stable chronic R post 10-12 rib fractures  Stable chronic compression fractures at T11, L1, and L4   No new fractures  o Home medication regimen as follows - continue inpatient:   - Tylenol 975 mg q8 hours  - continue  - Morphine 15 mg BID - hold  - Oxycodone 2 5/5 mg q6 hours PRN for moderate/severe pain - continue  - Gabapentin 300 mg BID - continue   • Diagnosis: Bipolar Disorder with Anxiety and Depression   o Home medication regimen is as follows - continue inpatient:  - Wellbutrin 100 mg BID - continue  - Duloxetine 60 mg BID - continue  - Lamictal 150 mg qHS - continue  - Ativan 1 mg qHS PRN and 1 mg daily PRN - holf      CV:   • Diagnosis: Hypertension  o SBP goal of 120-160 mmHg; MAP goal >65   o Hold home antihypertensives  o PRN hydralazine 10 mg q6 hours for BP >160  • Diagnosis: Nonischemic cardiomyopathy   o Follows with [facility]; 2/2022 TTE with LVEF 63%, G1DD  o Home regimen: Carvedilol 25 mg BID - hold at this time  o Repeat echo ordered       Pulm:  • Diagnosis: Acute Respiratory Insufficiency  o CT CAP 2/11 with bibasilar atelectasis not no infiltrate or consolidation   o Saturating appropriately on 6L, wean as able for an SpO2 >92%  o Encourage use of incentive spirometer  o Albuterol PRN for wheezing      GI:   • Diagnosis: GERD  o Home regimen: Protonix 40 mg PO daily - continue   • Bowel Regimen: Miralax 17 g PRN and Senokot BID  • Diet: NPO       :   • No active issues  • Monitor I/Os; Trend BUN/creatinine       F/E/N:   • F: Isolyte @ 125 cc/hr  • E: Monitor and replete for Mg>2, phos <3, K>4  • N: NPO; Speech Therapy Consulted      Heme/Onc:   • No active issue  • DVT ppx: SCDs; hold chemical ppx until repeat CT head obtained      Endo:   • No active issues  • Blood glucose goal of 140-180 while inpatient       ID:   · No active issues  · Monitor WBC and trend fever curve       MSK/Skin:   • Diagnosis: Seronegative Arthropathy in Multiple Joints  o Plaquenil 200 mg Monday - Friday, 400 mg Saturday and Sunday   o Continue home regimen while inpatient  • PT/OT consulted  • Skin surveillance and frequent skin checks       Disposition: Admit to Critical Care   Code Status: Prior  --------------------------------------------------------------------------------------------------------------  Review of Systems   Constitutional: Negative for fatigue  Eyes: Negative for visual disturbance  Respiratory: Negative for cough, shortness of breath and wheezing  Cardiovascular: Negative for chest pain and palpitations  Musculoskeletal: Positive for back pain  Neurological: Negative for dizziness, weakness, light-headedness, numbness and headaches         A 12-point, complete review of systems was reviewed and negative except as stated above     Physical Exam  Constitutional:       General: She is not in acute distress  Appearance: She is normal weight  She is not ill-appearing or toxic-appearing  HENT:      Head: Normocephalic and atraumatic  Nose: Nose normal  No congestion  Mouth/Throat:      Pharynx: No oropharyngeal exudate  Eyes:      General: No scleral icterus  Comments: Right gaze preference   Cardiovascular:      Rate and Rhythm: Normal rate and regular rhythm  Pulses: Normal pulses  Heart sounds: Normal heart sounds  No murmur heard  No gallop  Pulmonary:      Effort: Pulmonary effort is normal  No respiratory distress  Breath sounds: Normal breath sounds  No wheezing, rhonchi or rales  Comments: 6L NC O2  Abdominal:      General: Abdomen is flat  Bowel sounds are normal  There is no distension  Palpations: Abdomen is soft  Tenderness: There is no abdominal tenderness  There is no guarding  Hernia: No hernia is present  Musculoskeletal:         General: Normal range of motion  Cervical back: Normal range of motion  No rigidity  Right lower leg: No edema  Left lower leg: No edema  Skin:     General: Skin is warm  Capillary Refill: Capillary refill takes less than 2 seconds  Coloration: Skin is not pale  Neurological:      Mental Status: She is alert and oriented to person, place, and time  Motor: Weakness present  Comments: Dysarthria noted  1/5 hand  on left  0/5 left sided muscle strength  Able to flex/extend LUE but not lift against gravity  Psychiatric:         Mood and Affect: Mood normal          Behavior: Behavior normal          --------------------------------------------------------------------------------------------------------------  Vitals: There were no vitals filed for this visit    Temp  Min: 98 1 °F (36 7 °C)  Max: 98 1 °F (36 7 °C)        There is no height or weight on file to calculate BMI  N/A    Laboratory and Diagnostics:  Results from last 7 days   Lab Units 02/12/23  0302   WBC Thousand/uL 6 86   HEMOGLOBIN g/dL 14 5   HEMATOCRIT % 44 5   PLATELETS Thousands/uL 216     Results from last 7 days   Lab Units 02/12/23  0302   SODIUM mmol/L 141   POTASSIUM mmol/L 3 9   CHLORIDE mmol/L 109*   CO2 mmol/L 28   ANION GAP mmol/L 4   BUN mg/dL 13   CREATININE mg/dL 0 94   CALCIUM mg/dL 10 8*   GLUCOSE RANDOM mg/dL 98          Results from last 7 days   Lab Units 02/12/23  0302   INR  0 90   PTT seconds 24              ABG:    VBG:          Micro:        EKG: NSR on telemetry  Imaging: I have personally reviewed pertinent reports        Historical Information   Past Medical History:   Diagnosis Date   • Anxiety    • Asthma    • Bipolar depression ([email or web address] 75 )    • Chronic narcotic dependence ([facility] [[email or web address] 75 )    • Chronic pain    • Depression    • Enterovirus heart infection    • SHAR (obstructive sleep apnea)    • Osteoarthritis    • Peripheral neuropathy    • Plantar fasciitis of right foot    • Senile osteoporosis    • Seronegative arthropathy of multiple sites ([facility] [[email or web address] 75 )    • Undifferentiated connective tissue disease ([facility] [[email or web address] 75 )      Past Surgical History:   Procedure Laterality Date   • BREAST IMPLANT     • FIXATION KYPHOPLASTY LUMBAR SPINE     • HYSTERECTOMY     • INCONTINENCE SURGERY N/A    • NASAL SEPTOPLASTY W/ TURBINOPLASTY N/A    • RECTAL PROLAPSE REPAIR N/A    • REPAIR RECTOCELE     • RHINOPLASTY N/A      Social History   Social History     Substance and Sexual Activity   Alcohol Use Not Currently    Comment: quit     Social History     Substance and Sexual Activity   Drug Use Yes   • Types: Marijuana    Comment: has medical card     Social History     Tobacco Use   Smoking Status Former   Smokeless Tobacco Never     Exercise History: Unknown  Family History:   Family History   Problem Relation Age of Onset   • No Known Problems Mother    • No Known Problems Father    • Arthritis Family    • Diabetes Family    • Stroke Family      Family history unknown      Medications:  No current facility-administered medications for this encounter  Home medications:  Prior to Admission Medications   Prescriptions Last Dose Informant Patient Reported? Taking? DULOXETINE HCL PO   Yes No   Sig: Take 60 mg by mouth 2 (two) times a day  Indications: Depression   Patient not taking: Reported on 9/14/2022   LORAZEPAM PO   Yes No   Sig: Take 1 mg by mouth daily at bedtime  and also can take 1 daily as needed for anxiety  Indications: Feeling Anxious   Patient not taking: Reported on 9/14/2022   LORazepam (ATIVAN) 1 mg tablet   No No   Sig: Take 1 tablet (1 mg total) by mouth daily at bedtime for 3 days   LORazepam (ATIVAN) 1 mg tablet   Yes No   LamoTRIgine (LAMICTAL PO)   Yes No   Sig: Take 1 tablet by mouth daily at bedtime pt taking 150mg daily at bedtime    acetaminophen (TYLENOL) 325 mg tablet   No No   Sig: Take 3 tablets (975 mg total) by mouth every 8 (eight) hours   Patient taking differently: Take 975 mg by mouth every 8 (eight) hours pt takes 500mg tabs 1 to 2 tabs 3x day every 8 hours if needed   albuterol (PROVENTIL HFA,VENTOLIN HFA) 90 mcg/act inhaler   Yes No   Sig: Inhale 2 puffs every 6 (six) hours as needed for wheezing   Patient not taking: Reported on 9/14/2022   buPROPion (WELLBUTRIN) 100 mg tablet   Yes No   Sig: Take 100 mg by mouth 2 (two) times a day   carvedilol (COREG) 25 mg tablet   Yes No   Sig: Take 25 mg by mouth 2 (two) times a day with meals   gabapentin (NEURONTIN) 300 mg capsule   No No   Sig: Take 1 capsule (300 mg total) by mouth 2 (two) times a day   Patient not taking: Reported on 9/14/2022   gabapentin (Neurontin) 300 mg capsule   No No   Sig: Take 1 capsule (300 mg total) by mouth 2 (two) times a day   hydroxychloroquine (PLAQUENIL) 200 mg tablet   No No   Sig: Take 1 tablet (200mg) every Mon, Tues, Wed, Thurs, and Fri   Take 2 tablets (400mg) every Sat and Sun    lidocaine (Lidoderm) 5 %   No No   Sig: Apply 1 patch topically daily Remove & Discard patch within 12 hours or as directed by MD   Patient not taking: Reported on 2022   morphine (MS CONTIN) 15 mg 12 hr tablet   Yes No   Sig: Take 15 mg by mouth 2 (two) times a day    naloxone (NARCAN) 4 mg/0 1 mL nasal spray   No No   Sig: Administer 1 spray into a nostril  If no response after 2-3 minutes, give another dose in the other nostril using a new spray  Patient not taking: Reported on 2022   neomycin-bacitracin-polymyxin (NEOSPORIN) 5-400-5,000 ointment   No No   Sig: Apply topically 3 (three) times a day   Patient not taking: Reported on 2022   oxyCODONE (Roxicodone) 5 immediate release tablet   No No   Si 5 mg to 5 mg PO every 6 hours hours as needed for moderate to severe pain  Ongoing therapy  Patient not taking: Reported on 2022   pantoprazole (PROTONIX) 40 mg tablet   No No   Sig: Take 1 tablet (40 mg total) by mouth daily in the early morning   Patient not taking: Reported on 2022   polyethylene glycol (MIRALAX) 17 g packet   No No   Sig: Take 17 g by mouth daily as needed (Constipation)   Patient not taking: Reported on 2022   raNITIdine HCl (ZANTAC PO)   Yes No   Sig: Take by mouth   senna-docusate sodium (SENOKOT S) 8 6-50 mg per tablet   No No   Sig: Take 1 tablet by mouth 2 (two) times a day   Patient not taking: Reported on 2022   sodium chloride (OCEAN) 0 65 % nasal spray   No No   Si spray into each nostril as needed for congestion   Patient not taking: Reported on 2022      Facility-Administered Medications: None     Allergies:   Allergies   Allergen Reactions   • Dye [Iodinated Contrast Media] Anaphylaxis     IVP dye   • Bactrim [Sulfamethoxazole-Trimethoprim] Hives   • Erythromycin Hives   • Penicillins      ------------------------------------------------------------------------------------------------------------  Advance Directive and Living Will:      Power of :    POLST:    ------------------------------------------------------------------------------------------------------------  Anticipated Length of Stay is > 2 midnights    Care Time Delivered:   No Critical Care time spent       [facility], DO        Portions of the record may have been created with voice recognition software  Occasional wrong word or "sound a like" substitutions may have occurred due to the inherent limitations of voice recognition software    Read the chart carefully and recognize, using context, where substitutions have occurred

## 2023-02-12 NOTE — SEDATION DOCUMENTATION
IR cerebral angiogram with intervention by Dr Powell  Anesthesia present throughout entire case  Times are as follow in department: 503, in room: 503, access:[id number], first pass: 625, recan:625  TICI pre-0, TICI post-3  Right groin and right wrist used for access  TR band placed on wrist and groin closed with mynx   Report given to ICU rn and bed rest start 640

## 2023-02-12 NOTE — ED PROVIDER NOTES
Pt Name: Michael Jacinto  MRN: [id number]  Lbgfshawanda 1955  Age/Sex: 75 y o  female  Date of evaluation: 2/12/2023  PCP: Silvestre Cohn DO    CHIEF COMPLAINT    Chief Complaint   Patient presents with   • Medical Problem     Pt arrives via EMS from home, per ems pt's brother called due to pt being weak and unable to walk  Pt arrives noted to have slurred speech and unable to move left side         HPI      HPI      Past Medical and Surgical History    Past Medical History:   Diagnosis Date   • Anxiety    • Asthma    • Bipolar depression ([facility] )    • Chronic narcotic dependence ([facility] )    • Chronic pain    • Depression    • Enterovirus heart infection    • SHAR (obstructive sleep apnea)    • Osteoarthritis    • Peripheral neuropathy    • Plantar fasciitis of right foot    • Senile osteoporosis    • Seronegative arthropathy of multiple sites ([facility] )    • Undifferentiated connective tissue disease (HCC)        Past Surgical History:   Procedure Laterality Date   • BREAST IMPLANT     • FIXATION KYPHOPLASTY LUMBAR SPINE     • HYSTERECTOMY     • INCONTINENCE SURGERY N/A    • NASAL SEPTOPLASTY W/ TURBINOPLASTY N/A    • RECTAL PROLAPSE REPAIR N/A    • REPAIR RECTOCELE     • RHINOPLASTY N/A        Family History   Problem Relation Age of Onset   • No Known Problems Mother    • No Known Problems Father    • Arthritis Family    • Diabetes Family    • Stroke Family        Social History     Tobacco Use   • Smoking status: Former   • Smokeless tobacco: Never   Vaping Use   • Vaping Use: Never used   Substance Use Topics   • Alcohol use: Not Currently     Comment: quit   • Drug use: Yes     Types: Marijuana     Comment: has medical card               Allergies    Allergies   Allergen Reactions   • Dye [Iodinated Contrast Media] Anaphylaxis     IVP dye   • Bactrim [Sulfamethoxazole-Trimethoprim] Hives   • Erythromycin Hives   • Penicillins        Home Medications    Prior to Admission medications    Medication Sig Start Date End Date Taking? Authorizing Provider   acetaminophen (TYLENOL) 325 mg tablet Take 3 tablets (975 mg total) by mouth every 8 (eight) hours  Patient taking differently: Take 975 mg by mouth every 8 (eight) hours pt takes 500mg tabs 1 to 2 tabs 3x day every 8 hours if needed 3/8/22   Steve Granger PA-C   albuterol (PROVENTIL HFA,VENTOLIN HFA) 90 mcg/act inhaler Inhale 2 puffs every 6 (six) hours as needed for wheezing  Patient not taking: Reported on 9/14/2022    Historical Provider, MD   buPROPion (WELLBUTRIN) 100 mg tablet Take 100 mg by mouth 2 (two) times a day    Historical Provider, MD   carvedilol (COREG) 25 mg tablet Take 25 mg by mouth 2 (two) times a day with meals    Historical Provider, MD   DULOXETINE HCL PO Take 60 mg by mouth 2 (two) times a day  Indications: Depression  Patient not taking: Reported on 9/14/2022    Megan Amado DO   gabapentin (NEURONTIN) 300 mg capsule Take 1 capsule (300 mg total) by mouth 2 (two) times a day  Patient not taking: Reported on 9/14/2022 6/28/21   Lluvia Doss MD   gabapentin (Neurontin) 300 mg capsule Take 1 capsule (300 mg total) by mouth 2 (two) times a day 4/7/22   Velma Trejo PA-C   hydroxychloroquine (PLAQUENIL) 200 mg tablet Take 1 tablet (200mg) every Mon, Tues, Wed, Thurs, and Fri  Take 2 tablets (400mg) every Sat and Sun  12/29/22 6/28/23  Anna Ca MD   LamoTRIgine (LAMICTAL PO) Take 1 tablet by mouth daily at bedtime pt taking 150mg daily at bedtime     Historical Provider, MD   lidocaine (Lidoderm) 5 % Apply 1 patch topically daily Remove & Discard patch within 12 hours or as directed by MD  Patient not taking: Reported on 9/14/2022 3/9/22   Steve Granger PA-C   LORazepam (ATIVAN) 1 mg tablet Take 1 tablet (1 mg total) by mouth daily at bedtime for 3 days 3/8/22 3/11/22  Steve Granger PA-C   LORazepam (ATIVAN) 1 mg tablet     Historical Provider, MD   LORAZEPAM PO Take 1 mg by mouth daily at bedtime   and also can take 1 daily as needed for anxiety Indications: Feeling Anxious  Patient not taking: Reported on 9/14/2022    Kimani Enrique MD   morphine (MS CONTIN) 15 mg 12 hr tablet Take 15 mg by mouth 2 (two) times a day     Historical Provider, MD   naloxone (NARCAN) 4 mg/0 1 mL nasal spray Administer 1 spray into a nostril  If no response after 2-3 minutes, give another dose in the other nostril using a new spray  Patient not taking: Reported on 9/14/2022 4/7/22   Javid Savage PA-C   neomycin-bacitracin-polymyxin (NEOSPORIN) 5-400-5,000 ointment Apply topically 3 (three) times a day  Patient not taking: Reported on 9/14/2022 7/21/21   Magdiel Boles PA-C   oxyCODONE (Roxicodone) 5 immediate release tablet 2 5 mg to 5 mg PO every 6 hours hours as needed for moderate to severe pain  Ongoing therapy    Patient not taking: Reported on 9/14/2022 4/7/22   Jameson Pryor PA-C   pantoprazole (PROTONIX) 40 mg tablet Take 1 tablet (40 mg total) by mouth daily in the early morning  Patient not taking: Reported on 9/14/2022 6/29/21   William Pickett MD   polyethylene glycol (MIRALAX) 17 g packet Take 17 g by mouth daily as needed (Constipation)  Patient not taking: Reported on 9/14/2022 3/8/22   Sara Kim PA-C   raNITIdine HCl (ZANTAC PO) Take by mouth    Historical Provider, MD   senna-docusate sodium (SENOKOT S) 8 6-50 mg per tablet Take 1 tablet by mouth 2 (two) times a day  Patient not taking: Reported on 9/14/2022 3/8/22   Sara Kim PA-C   sodium chloride (OCEAN) 0 65 % nasal spray 1 spray into each nostril as needed for congestion  Patient not taking: Reported on 9/14/2022 3/8/22   Sara Kim PA-C           Review of Systems    Review of Systems      Physical Exam      ED Triage Vitals   Temperature Pulse Respirations Blood Pressure SpO2   02/12/23 0352 02/12/23 0303 02/12/23 0303 02/12/23 0303 02/12/23 0303   98 1 °F (36 7 °C) 85 18 133/75 97 %      Temp src Heart Rate Source Patient Position - Orthostatic VS BP Location FiO2 (%) -- 02/12/23 0303 02/12/23 0303 02/12/23 0303 --    Monitor Lying Left arm       Pain Score       --                      Physical Exam           Assessment and Plan        MDM    Diagnostic Results        Labs:    Results for orders placed or performed during the hospital encounter of 02/12/23   FLU/RSV/COVID - if FLU/RSV clinically relevant    Specimen: Nose; Nares   Result Value Ref Range    SARS-CoV-2 Negative Negative    INFLUENZA A PCR Negative Negative    INFLUENZA B PCR Negative Negative    RSV PCR Negative Negative   Basic metabolic panel   Result Value Ref Range    Sodium 141 135 - 147 mmol/L    Potassium 3 9 3 5 - 5 3 mmol/L    Chloride 109 (H) 96 - 108 mmol/L    CO2 28 21 - 32 mmol/L    ANION GAP 4 4 - 13 mmol/L    BUN 13 5 - 25 mg/dL    Creatinine 0 94 0 60 - 1 30 mg/dL    Glucose 98 65 - 140 mg/dL    Calcium 10 8 (H) 8 4 - 10 2 mg/dL    eGFR 62 ml/min/1 73sq m   CBC and Platelet   Result Value Ref Range    WBC 6 86 4 31 - 10 16 Thousand/uL    RBC 4 66 3 81 - 5 12 Million/uL    Hemoglobin 14 5 11 5 - 15 4 g/dL    Hematocrit 44 5 34 8 - 46 1 %    MCV 96 82 - 98 fL    MCH 31 1 26 8 - 34 3 pg    MCHC 32 6 31 4 - 37 4 g/dL    RDW 13 8 11 6 - 15 1 %    Platelets 423 539 - 691 Thousands/uL    MPV 9 8 8 9 - 12 7 fL   Protime-INR   Result Value Ref Range    Protime 12 1 11 6 - 14 5 seconds    INR 0 90 0 84 - 1 19   APTT   Result Value Ref Range    PTT 24 23 - 37 seconds   HS Troponin 0hr (reflex protocol)   Result Value Ref Range    hs TnI 0hr 12 "Refer to ACS Flowchart"- see link ng/L   Fingerstick Glucose (POCT)   Result Value Ref Range    POC Glucose 113 65 - 140 mg/dl       All labs reviewed and utilized in the medical decision making process    Radiology:    CT chest abdomen pelvis w contrast   Final Result      No evidence of solid organ injury  No acute intra-abdominal abnormality  No free air or free fluid  Bibasilar atelectasis with no infiltrate or consolidation  No pneumothorax  Stable chronic right posterior 10th, 11th and 12th rib fractures  Stable chronic compression fractures at T11, L1 and L4  No new fracture  Workstation performed: [id number]         CT recon only thoracolumbar   Final Result      No fracture or traumatic subluxation  Stable chronic compression fractures at T11, L1 and L4  Stable old right transverse process fractures at L2 and L3  Workstation performed: [id number]         CTA stroke alert (head/neck)   Final Result      Abrupt cut off of flow at the distal right MCA M1 segment compatible with an acute infarct  Collateralized blood flow is noted within the distal right MCA branches  3 mm saccular aneurysm arising from the medial aspect of the proximal right ICA supraclinoid segment  No hemodynamically significant stenosis within either common or internal carotid artery  Less than 50% stenosis by NASCET criteria  I personally discussed this study with Jona Rainey on 2/12/2023 at 3:20 AM                            Workstation performed: [id number]         CT stroke alert brain   Final Result      Area of loss of the gray-white matter differentiation involving the right parietotemporal region suspicious for acute ischemia  Occlusion of the distal right MCA M1 segment is also noted on a CTA head and neck performed at same time, compatible with an    acute infarct  An MRI brain could be performed for further evaluation  No acute intracranial hemorrhage  Mild chronic small vessel ischemic changes               I personally discussed this study with Jona Rainey on 2/12/2023 at 3:20 AM                Workstation performed: [id number]             All radiology studies independently viewed by me and interpreted by the radiologist     Procedure    CriticalCare Time  Performed by: Naty Ellis DO  Authorized by: Naty Ellis DO     Critical care provider statement:     Critical care time (minutes):  60    Critical care time was exclusive of:  Separately billable procedures and treating other patients and teaching time    Critical care was time spent personally by me on the following activities:  Blood draw for specimens, obtaining history from patient or surrogate, development of treatment plan with patient or surrogate, discussions with consultants, evaluation of patient's response to treatment, examination of patient, interpretation of cardiac output measurements, ordering and performing treatments and interventions, ordering and review of laboratory studies, ordering and review of radiographic studies, re-evaluation of patient's condition and review of old charts    I assumed direction of critical care for this patient from another provider in my specialty: no            ED Course of Care and Re-Assessments      Medications   diphenhydrAMINE (BENADRYL) injection 25 mg (25 mg Intravenous Given 2/12/23 0314)   iohexol (OMNIPAQUE) 350 MG/ML injection (MULTI-DOSE) 100 mL (100 mL Intravenous Given 2/12/23 0340)           FINAL IMPRESSION    Final diagnoses:   Stroke-like episode         DISPOSITION/PLAN    Time reflects when diagnosis was documented in both MDM as applicable and the Disposition within this note     Time User Action Codes Description Comment    2/12/2023  3:00 AM Darshan Hoang Add [R29 90] Stroke-like episode       ED Disposition     ED Disposition   Transfer to Another Facility-In Network    Condition   --    Date/Time   Sun Feb 12, 2023  4:15 AM    Comment   Alexantonieta Single should be transferred out to B             MD Documentation    Judith Hurter Most Recent Value   Patient Condition The patient has been stabilized such that within reasonable medical probability, no material deterioration of the patient condition or the condition of the unborn child(michael) is likely to result from the transfer   Reason for Transfer Level of Care needed not available at this facility   Benefits of Transfer Specialized equipment and/or services available at the receiving facility (Include comment)________________________  [Neurosurgery]   Risks of Transfer Potential for delay in receiving treatment, Potential deterioration of medical condition, Loss of IV, Increased discomfort during transfer, Possible worsening of condition or death during transfer   Accepting Physician Dr Danica Veronica Name, [place], [place]   Sending MD Palacio   Provider Certification General risk, such as traffic hazards, adverse weather conditions, rough terrain or turbulence, possible failure of equipment (including vehicle or aircraft), or consequences of actions of persons outside the control of the transport personnel, Unanticipated needs of medical equipment and personnel during transport, Risk of worsening condition, The possibility of a transport vehicle being unavailable      RN Documentation    Flowsheet Row Most [address] [facility] Name, [place], [place]   Transport Mode Ambulance   Level of Care CCT-Nurse   Transfer Date 02/12/23      Follow-up Information    None           PATIENT REFERRED TO:    No follow-up provider specified  DISCHARGE MEDICATIONS:    Discharge Medication List as of 2/12/2023  4:44 AM      CONTINUE these medications which have NOT CHANGED    Details   acetaminophen (TYLENOL) 325 mg tablet Take 3 tablets (975 mg total) by mouth every 8 (eight) hours, Starting Tue 3/8/2022, No Print      albuterol (PROVENTIL HFA,VENTOLIN HFA) 90 mcg/act inhaler Inhale 2 puffs every 6 (six) hours as needed for wheezing, Historical Med      buPROPion (WELLBUTRIN) 100 mg tablet Take 100 mg by mouth 2 (two) times a day, Historical Med      carvedilol (COREG) 25 mg tablet Take 25 mg by mouth 2 (two) times a day with meals, Historical Med      DULOXETINE HCL PO Take 60 mg by mouth 2 (two) times a day   Indications: Depression, Historical Med      !! gabapentin (NEURONTIN) 300 mg capsule Take 1 capsule (300 mg total) by mouth 2 (two) times a day, Starting Mon 6/28/2021, Normal      !! gabapentin (Neurontin) 300 mg capsule Take 1 capsule (300 mg total) by mouth 2 (two) times a day, Starting Thu 4/7/2022, Normal      hydroxychloroquine (PLAQUENIL) 200 mg tablet Take 1 tablet (200mg) every Mon, Tues, Wed, Thurs, and Fri  Take 2 tablets (400mg) every Sat and Sun , Normal      LamoTRIgine (LAMICTAL PO) Take 1 tablet by mouth daily at bedtime pt taking 150mg daily at bedtime , Historical Med      lidocaine (Lidoderm) 5 % Apply 1 patch topically daily Remove & Discard patch within 12 hours or as directed by MD, Starting Wed 3/9/2022, No Print      !! LORazepam (ATIVAN) 1 mg tablet Historical Med      !! LORAZEPAM PO Take 1 mg by mouth daily at bedtime  and also can take 1 daily as needed for anxiety  Indications: Feeling Anxious, Historical Med      morphine (MS CONTIN) 15 mg 12 hr tablet Take 15 mg by mouth 2 (two) times a day , Historical Med      naloxone (NARCAN) 4 mg/0 1 mL nasal spray Administer 1 spray into a nostril  If no response after 2-3 minutes, give another dose in the other nostril using a new spray , Normal      neomycin-bacitracin-polymyxin (NEOSPORIN) 5-400-5,000 ointment Apply topically 3 (three) times a day, Starting Wed 7/21/2021, Print      oxyCODONE (Roxicodone) 5 immediate release tablet 2 5 mg to 5 mg PO every 6 hours hours as needed for moderate to severe pain   Ongoing therapy , Normal      pantoprazole (PROTONIX) 40 mg tablet Take 1 tablet (40 mg total) by mouth daily in the early morning, Starting Tue 6/29/2021, Normal      polyethylene glycol (MIRALAX) 17 g packet Take 17 g by mouth daily as needed (Constipation), Starting Tue 3/8/2022, No Print      raNITIdine HCl (ZANTAC PO) Take by mouth, Historical Med      senna-docusate sodium (SENOKOT S) 8 6-50 mg per tablet Take 1 tablet by mouth 2 (two) times a day, Starting Tue 3/8/2022, No Print      sodium chloride (OCEAN) 0 65 % nasal spray 1 spray into each nostril as needed for congestion, Starting Tue 3/8/2022, No Print       !! - Potential duplicate medications found  Please discuss with provider  No discharge procedures on file           Sharon Singh, [address] [place] [address], DO  02/14/23 7308

## 2023-02-12 NOTE — CONSULTS
Patient seen and examined prior to intervention  Assessment and plan  59-year-old female with a history of chronic pain who was last known well at 5 and worsens over the course of several hours  Presented to [facility] as a stroke alert  Was found to have right MCA occlusion  NIHSS was 13  CT with aspects of 9  Perfusion study deferred  There were multiple attempts to contact family including the daughter and brother without success  Procedure was explained to the patient and she nodded approval     Chief complaint  right MCA syndrome   History of present illness  59-year-old female with a history of chronic pain who was last known well at 5 and worsens over the course of several hours  Presented to [facility] as a stroke alert  Was found to have right MCA occlusion  NIHSS was 13  CT with aspects of 9  Perfusion study deferred    Past medical history, past surgical history, allergies, social history reviewed in the electronic medical record  Review of systems unobtainable    Temp:  [98 1 °F (36 7 °C)] 98 1 °F (36 7 °C)  HR:  [73-88] 77  Resp:  [16-18] 16  BP: ()/(56-79) 141/79   She is awake and alert  Significant dysarthria  Right gaze preference  Left hemiplegia  Left facial   NIHSS 13

## 2023-02-12 NOTE — ASSESSMENT & PLAN NOTE
PPD#0 TICI 3 revascularization of a right m1 occlusion (Dr [facility] 2/12/2023)  · Presented with right MCA syndrome, NIH 13    Imaging reviewed personally and with attending, results are as follows:  · CTA stroke alert 2/12/2023: Abrupt cut off of flow at the distal right MCA M1 segment compatible with an acute infarct  Collateralized blood flow is noted within the distal right MCA branches  3 mm saccular aneurysm arising from the medial aspect of the proximal right ICA supraclinoid segment  No hemodynamically significant stenosis within either common or internal carotid artery  Less than 50% stenosis by NASCET criteria  Plan:  • Continue to monitor neurological exam, GCS 15, dysarthric speech, left arm more than leg weakness and ataxia  • Repeat CT head in 24 hours for stability vs CT head stat if GCS declines more than 2 points in 1 hour  • MRI brain ordered and pending  • Neurology following for stroke management, on stroke pathway  • Systolic blood pressure less than 120, MAP greater than 65  • Medical management and pain control per primary team  • DVT ppx:  SCDs, Lovenox  • Mobilize as tolerated with assistance, PT / OT /speech evaluation evaluation    Neurosurgery will continue to follow  Please call with questions or concerns

## 2023-02-12 NOTE — ASSESSMENT & PLAN NOTE
75 y o  female with CHF, SHAR, nonischemic cardiomyopathy, bipolar disorder, chronic pain disorder, and seronegative arthropathy of multiple sites on Plaquenil     Presented to [facility] on 2/12 as stroke alert for right gaze preference, left-sided sensory deficits/weakness (face/arm/leg), dysarthria, and mild aphasia  NIHSS 13      CT head revealed acute infarct in the right parietotemporal region  CTA head/neck showing R M1 occlusion s/p mechanical thrombectomy TICI 3 revascularization  Patient was not a TNK candidate due to being outside the time window  · MRI brain revealed acute/subacute infarct in the right MCA territory with superimposed petechial hemorrhage and punctate acute/subactue infarct in the right cerebellum  · Echo: EF 25-30% with global hypokinesis, moderately dilated left atrium, normal right atrium  · EF significantly reduced from Echo performed on 4/23/2022 (EF at that time was 65%)  · CT head 2/13 with evolving right MCA territory strokes with hyperdensity, likely representing a combination of resolving retained contrast and superimposed petechial hemorrhage  · LDL 84, Cholesterol 181, A1c 5 0    Etiology for M1 occlusion and infarcts in different vascular territories likely cardioembolic (possibly due to low EF of 25-30% vs undiagnosed paroxysmal A-fib given dilated left atrium)  Patient does report recent office visit with her cardiologist where she was informed that she had tachycardia, denying Afib       Plan:  - CT head repeated this morning (02/14); still with concern for subcortical and cortical hemorrhagic conversion; and with minimal increase in L midline shift   -would curbside neurosurgery to see if any change in plan given the increase in shift (likely not though, as neurologic exam is stable/unchanged today)  - Would still hold on any AP/AC; repeat CT head in 10 days (2/23):   -during that repeat CT: if hemorrhage improved, but still present to a degree would start aspirin 81 mg daily   -when hemorrhage fully resolved radiographically, would then transition to anticoagulation (ideally Eliquis 5 mg BID)  - Heart failure/cardiology following with cardiomyopathy   -ok to resume Coreg from neuro standpoint  - Continue atorvastatin 40 mg daily  - SBP <140   - Goal euglycemia, normothermia  - Telemetry  - Frequent neurochecks  - PT/OT/speech  - Stroke education  - STAT CT head with any acute change in neurologic exam  - Medical management supportive care per primary team, notify with changes

## 2023-02-12 NOTE — ASSESSMENT & PLAN NOTE
Acute R MCA CVA, M1 occlusion   PPD#1 - mechanical thrombectomy w/ TICI 3 revascularization of a right m1 occlusion (Dr Shook Learn 2/12/2023)  · Presented with right MCA syndrome, with initial NIH 13  · Not a tnk candidate on arrival     Imaging:  · 2/13 CT head: Evolving right MCA territory infarcts with associated hyperdensity likely representing a combination of resolving retained contrast and superimposed petechial hemorrhage  · 2/12 MRI brain: Acute to subacute right MCA territory infarct with superimposed petechial hemorrhage  A 0 5 cm punctate focus of acute to subacute ischemia is noted within the right cerebellum  · CTA stroke alert 2/12/2023: Abrupt cut off of flow at the distal right MCA M1 segment compatible with an acute infarct  Collateralized blood flow is noted within the distal right MCA branches  3 mm saccular aneurysm arising from the medial aspect of the proximal right ICA supraclinoid segment  No hemodynamically significant stenosis within either common or internal carotid artery  Less than 50% stenosis by NASCET criteria  Plan:  • Continue to closely monitor neuro exam   - Frequent neuro checks per primary team   - Repeat STAT CTH with any acute decline   • Maintain SBP goal up to160   • No further neurosurgical intervention indicated at this time   - Repeat CTH and MRI reviewed, R MCA distribution stroke noted with small amount of hemorrhage within stroke bed  • R groin access site without active bleeding or hematoma, mild tenderness, continue local wound care to site    • Continue on stroke pathway/stroke management per neurology and [facility]   • DVT ppx: SCDs, lovenox   • Pain control per primary team  • Mobilize as tolerated with assistance, PT/OT/speech evaluation evaluation  • Continue medical management per primary team   • social work following for assistance with dispo once medically stable     Neurosurgery will sign off at this time   Pt will be seen in the nsgy office for a 3 month clinical follow-up appointment  Pt should be followed by neurology as an outpatient for management of her stroke  Please reach out with any further questions or concerns  Neurosurgery will continue to follow  Please call with questions or concerns

## 2023-02-12 NOTE — PROGRESS NOTES
Unable to contact daughter or brother pre or post procedure  Messages left for both with instructions to contact the ICU

## 2023-02-12 NOTE — QUICK NOTE
Ms Andres Moore is a 75 y o  female w/ pertinent PMHx of CHF (EF 63%, mild LA dilation) who presented w/ L sided weakness, dysarthria to [address] on 23  Stroke alert at 2:55 AM and was found to have R M1 occlusion  Transferred to [address] [place] [address] for thrombectomy  Previously independent w/ mRS 0-1 (as per chart review, PT note 22)  LKN 6 PM- 23, NIHSS 13 (R gaze preference, mild L facial droop, LUE, LLE weakness, L sided sensory loss, mild aphasia, mild dysathria)  SBP 130s initially, now on kelvin drip  [address] [place]: loss of gray white differentiation in R temporo-parietal region  ASPECT- 9  CTA h/n- distalR M1 occlusion   Not a TNK candidate due to being out of time window  Temp:  [98 1 °F (36 7 °C)] 98 1 °F (36 7 °C)  HR:  [73-88] 77  Resp:  [16-18] 16  BP: ()/(56-79) 141/79  SpO2:  [94 %-97 %] 95 %  Results from last 7 days   Lab Units 23  0302   HEMOGLOBIN g/dL 14 5   HEMATOCRIT % 44 5   PLATELETS Thousands/uL 216   INR  0 90   PTT seconds 24     NIHSS    1a Level of Consciousness: 0 = Alert   1b  LOC Questions: 0 = Answers both correctly   1c  LOC Commands: 0 = Obeys both correctly   2  Best Gaze: 1 = Partial Gaze Palsy- R gaze preference    3  Visual: 0 = No visual field loss- blink to threat intact b/l   4  Facial Palsy: 1=Minor paralysis (flattened nasolabial fold, asymmetric on smiling)   5a  Motor Right Arm: 0=No drift, limb holds 90 (or 45) degrees for full 10 seconds   5b  Motor Left Arm: 4=No movement   6a  Motor Right Le=No drift, limb holds 90 (or 45) degrees for full 10 seconds   6b  Motor Left Le=No movement   7  Limb Ataxia:  UN = Untestable (amputation, fused joint)   8  Sensory: 1=Mild to moderate sensory loss; patient feels pinprick is less sharp or is dull on the affected side; there is a loss of superficial pain with pinprick but patient is aware She is being touched   9   Best Language:  1=Mild to moderate aphasia; some obvious loss of fluency or facility of comprehension without significant limitation on ideas expressed or form of expression  10  Dysarthria: 1=Mild to moderate, patient slurs at least some words and at worst, can be understood with some difficulty   11   Extinction and Inattention (formerly Neglect): 0=No abnormality   Total Score: 13     NIHSS completed at 5:08AM       AUSTIN Delvalle , [address] [place] [address] Neurology Residency

## 2023-02-12 NOTE — PROGRESS NOTES
I have personally seen and examined patient and reviewed all data with resident  Agree with note, assessment and plan  Critical care time 43min  Please refer to attending comments below  Critical care time does not include procedures, family meeting or teaching  Endovascular stroke alert    Visit Vitals  /69 (BP Location: Left arm)   Pulse 78   Temp 98 1 °F (36 7 °C) (Rectal)   Resp 18   Ht 5' 3" (1 6 m)   Wt 65 4 kg (144 lb 2 9 oz)   SpO2 96%   BMI 25 54 kg/m²   OB Status Hysterectomy   Smoking Status Former   BSA 1 68 m²     GEN: NAD, awake and alert  HEENT: dry membranes, NC/AT, eomi, pupils equal, tongue protrudes left, left facial droop  CV: irreg, sinus with PAC on tele, right wrist TR band in place, right groin no ecchymosis or pulsatile mass, feels full at access site but not expanding and no bleeding  Resp:  CTA, no R/R/W  GI: soft,NT/ND  Neuro: Left facial droop, left upper extremity patient able to lift off the bed to approximately shoulder level and hold elevated for 3 seconds before drifting back down, left lower extremity 5 out of 5 plantar and dorsi flexion and able to bend at the knee against resistance, appears to have a degree of neglect to the left side  Skin: warm, dry    Acute stroke initial NIH 13- Right M1 LVO s/p thrombectomy TICI 3 flow  Respiratory insufficiency with atelectasis   Asthma without exacerbation  SHAR  Mixed connective tissue disease/ seronegative arthropathy- continue plaquinil  Chronic pain- with old fx rib and vertebral, with chronic opioid dependence   Peripheral neuropathy  Bipolar/ depression    Goal SBP   120-160    Continue with neurologic checks Q 1 hour  Repeat imaging at 24 hours status post endovascular intervention, MRI pending  Plan to initiate anti-platelet therapy with aspirin  Statin medication ordered  Continue with stroke protocol with PT/OT and speech evaluation  HbA1C, lipid panel and echo ordered  Neurology consult       Plan to to reimage if patient has decline in neurologic exam or < GCS by 2 points or more  Monitor patient's vascular access sites every hour  Hold home coreg with acute stroke  Echo pending  Restart prn oxy and gabapentin for chronic pain, patient is on schedule morphine as an out patient  Encourage pulmonary hygiene with deep breathing and incentive spirometry  Mobilize  later today  Respiratory protocol with history of asthma  Patient denies using CPAP at night

## 2023-02-12 NOTE — CONSULTS
Consultation - Neurology   Zondra Apley 75 y o  female MRN: [id number]  Unit/Bed#: ICU 06 Encounter: [id number]      Assessment/Plan   * Stroke [facility])  Assessment & Plan  Zondra Apley is a 75 y o  female with CHF, SHAR, nonischemic cardiomyopathy, bipolar disorder, chronic pain disorder who presents to [place] [address] ED on 2/12/2023 as a stroke alert with left-sided weakness and dysarthria     - Last known normal 6 PM 2/11/2023; stroke alert called at 2:55 AM 2/12/2023  - NIHSS 13 for right gaze preference, mild left facial weakness, left upper and lower extremity weakness, left hemisensory deficit, mild aphasia, and mild dysarthria  - CT head   · Loss of gray-white matter differentiation in right parietotemporal region concerning for acute infarct  - CTA head and neck:  · Abrupt cut off at the distal right MCA M1 segment, with collateralized blood flow within the distal right MCA branches  · Also demonstrates 3 mm saccular aneurysm in medial aspect of proximal right ICA supraclinoid segment  · Otherwise, no critical stenosis  - Patient was not an IV tPA candidate  - CT perfusion deferred , s/p thrombectomy with TICI 3 revascularization    Right M1 occlusion, s/p thrombectomy TICI 3  Etiology of acute infarct unclear, possibly paroxysmal A-fib given known dilated left atrium  Patent does report recent office visit with her cardiologist where she was informed that she had tachycardia, denying Afib       Plan:  - Repeat CT head pending  - MRI brain pending  - Echo pending  - Antiplatelet therapy recommendations per neurosurgery team   - Continue atorvastatin 40 mg daily  - SBP<160   - Goal euglycemia, normothermia  - Telemetry  - Frequent neurochecks  - PT/OT/speech  - Stroke education  - STAT CT head with any acute change in neurologic exam  - Medical management supportive care per primary team, notify with changes    HTN (hypertension)  Assessment & Plan  - BP on presentation to [facility] 133/75  - SBP goal <160  - Management per ICU team    Bipolar depression ([facility] )  Assessment & Plan  - On Wellbutrin, Cymbalta, and Lamictal at baseline  - Management per primary team    Chronic pain syndrome  Assessment & Plan  - History of rib and compression fractures  - On oxycodone, morphine, gabapentin, and Tylenol at home  - Pain management per primary team    Reji Yun will need follow up in in 6 weeks with neurovascular attending or advance practitioner  Outpatient neurological testing to be determined  History of Present Illness     Reason for Consult / Principal Problem: CVA    HPI: Reji Yun is a 75 y o   female with CHF, SHAR, bipolar disorder, chronic pain disorder who presents to [place] [address] ED on 2/12/2023 as a stroke alert with left-sided weakness and dysarthria  Last known normal reportedly 6 PM on 2/11/2023  Shortly after this time patient developed left-sided weakness which resulted in a fall with positive head strike, without LOC  Left-sided weakness at that time progressively worsened  Patient's brother was concern for drug overdose, reporting patient with similar prior symptoms in the past which was thought to be from drug overdose  EMS was then called  Patient is not on AP/AC therapy at baseline  Patient denies history of stroke/TIA and seizure history  Of note, patients daughter states patient had a recent appointment with cardiology where she was told that she had an "irregular heart rhythm"  Patient clarified that her irregular rhythm was tachycardia and denied A-fib  Patient presented to [facility] ED as a stroke alert on 2/12/2023  BP on presentation WNL at 133/75  NIHSS of 13 per chart review  CT head demonstrated loss of gray-white matter in the right parietotemporal region concerning for acute infarct   CTA head and neck demonstrated abrupt cut off of distal right M1 segment with collateralized blood flow within the distal right MCA branches, as well as a 3 mm saccular aneurysm of proximal right ICA supraclinoid segment  Patient was not an IV TNK candidate as patient was outside of her appropriate time window  She was then transferred to [place] for thrombectomy, s/p TICI3  Today on exam 2/12/2023, following thrombectomy, patient stated she felt the same as she was prior to the procedure  Patient was reporting significant back pain and right lower extremity pain from where they had access through the right groin  Patient states that she continues to experience a left-sided weakness and dysarthria, which she admits may be worsened from having a dry mouth      Inpatient consult to Neurology  Consult performed by: Marlen Bentley PA-C  Consult ordered by: Doyle Vee DO        Review of Systems  12 point ROS limited to acuity of condition   Historical Information   Past Medical History:   Diagnosis Date   • Anxiety    • Asthma    • Bipolar depression ([facility] )    • Chronic narcotic dependence ([facility] )    • Chronic pain    • Depression    • Enterovirus heart infection    • SHAR (obstructive sleep apnea)    • Osteoarthritis    • Peripheral neuropathy    • Plantar fasciitis of right foot    • Senile osteoporosis    • Seronegative arthropathy of multiple sites ([facility] )    • Undifferentiated connective tissue disease ([facility] )      Past Surgical History:   Procedure Laterality Date   • BREAST IMPLANT     • FIXATION KYPHOPLASTY LUMBAR SPINE     • HYSTERECTOMY     • INCONTINENCE SURGERY N/A    • NASAL SEPTOPLASTY W/ TURBINOPLASTY N/A    • RECTAL PROLAPSE REPAIR N/A    • REPAIR RECTOCELE     • RHINOPLASTY N/A      Social History   Social History     Substance and Sexual Activity   Alcohol Use Not Currently    Comment: quit     Social History     Substance and Sexual Activity   Drug Use Yes   • Types: Marijuana    Comment: has medical card     E-Cigarette/Vaping   • E-Cigarette Use Never User      E-Cigarette/Vaping Substances   • Nicotine No    • THC No    • CBD No    • Flavoring No    • Other No    • Unknown No      Social History     Tobacco Use   Smoking Status Former   Smokeless Tobacco Never     Family History:   Family History   Problem Relation Age of Onset   • No Known Problems Mother    • No Known Problems Father    • Arthritis Family    • Diabetes Family    • Stroke Family        Review of previous medical records was completed  Meds/Allergies   all current active meds have been reviewed, current meds:   Current Facility-Administered Medications   Medication Dose Route Frequency   • acetaminophen (TYLENOL) tablet 975 mg  975 mg Oral Q6H PRN   • atorvastatin (LIPITOR) tablet 40 mg  40 mg Oral QPM   • buPROPion (WELLBUTRIN) tablet 100 mg  100 mg Oral BID   • chlorhexidine (PERIDEX) 0 12 % oral rinse 15 mL  15 mL Mouth/Throat Q12H [facility]   • DULoxetine (CYMBALTA) delayed release capsule 60 mg  60 mg Oral BID   • enoxaparin (LOVENOX) subcutaneous injection 40 mg  40 mg Subcutaneous Q24H [facility]   • gabapentin (NEURONTIN) capsule 300 mg  300 mg Oral BID   • hydrALAZINE (APRESOLINE) injection 10 mg  10 mg Intravenous Q6H PRN   • [START ON 2/13/2023] hydroxychloroquine (PLAQUENIL) tablet 200 mg  200 mg Oral Once per day on Mon Tue Wed Thu Fri   • [START ON 2/18/2023] hydroxychloroquine (PLAQUENIL) tablet 400 mg  400 mg Oral Once per day on Sun Sat   • lamoTRIgine (LaMICtal) tablet 150 mg  150 mg Oral HS   • oxyCODONE (ROXICODONE) IR tablet 2 5 mg  2 5 mg Oral Q6H PRN    Or   • oxyCODONE (ROXICODONE) IR tablet 5 mg  5 mg Oral Q6H PRN   • sodium chloride 0 9 % infusion  125 mL/hr Intravenous Continuous   , PTA meds:   Prior to Admission Medications   Prescriptions Last Dose Informant Patient Reported? Taking? DULOXETINE HCL PO 2/11/2023  Yes Yes   Sig: Take 60 mg by mouth 2 (two) times a day   LORAZEPAM PO   Yes No   Sig: Take 1 mg by mouth daily at bedtime   and also can take 1 daily as needed for anxiety  Indications: Feeling Anxious   Patient not taking: Reported on 9/14/2022   LORazepam (ATIVAN) 1 mg tablet   No No   Sig: Take 1 tablet (1 mg total) by mouth daily at bedtime for 3 days   LORazepam (ATIVAN) 1 mg tablet 2/11/2023  Yes Yes   LamoTRIgine (LAMICTAL PO) 2/11/2023  Yes Yes   Sig: Take 1 tablet by mouth daily at bedtime pt taking 150mg daily at bedtime    acetaminophen (TYLENOL) 325 mg tablet Unknown  No No   Sig: Take 3 tablets (975 mg total) by mouth every 8 (eight) hours   Patient taking differently: Take 975 mg by mouth every 8 (eight) hours pt takes 500mg tabs 1 to 2 tabs 3x day every 8 hours if needed   albuterol (PROVENTIL HFA,VENTOLIN HFA) 90 mcg/act inhaler 2/11/2023  Yes Yes   Sig: Inhale 2 puffs every 6 (six) hours as needed for wheezing   buPROPion (WELLBUTRIN) 100 mg tablet 2/11/2023  Yes Yes   Sig: Take 100 mg by mouth 2 (two) times a day   carvedilol (COREG) 25 mg tablet 2/11/2023  Yes Yes   Sig: Take 1 25 mg by mouth 2 (two) times a day with meals   gabapentin (NEURONTIN) 300 mg capsule Past Month  No Yes   Sig: Take 1 capsule (300 mg total) by mouth 2 (two) times a day   gabapentin (Neurontin) 300 mg capsule Past Month  No Yes   Sig: Take 1 capsule (300 mg total) by mouth 2 (two) times a day   hydroxychloroquine (PLAQUENIL) 200 mg tablet 2/12/2023  No Yes   Sig: Take 1 tablet (200mg) every Mon, Tues, Wed, Thurs, and Fri  Take 2 tablets (400mg) every Sat and Sun    lidocaine (Lidoderm) 5 %   No No   Sig: Apply 1 patch topically daily Remove & Discard patch within 12 hours or as directed by MD   Patient not taking: Reported on 9/14/2022   morphine (MS CONTIN) 15 mg 12 hr tablet 2/11/2023  Yes Yes   Sig: Take 15 mg by mouth 2 (two) times a day    naloxone (NARCAN) 4 mg/0 1 mL nasal spray   No No   Sig: Administer 1 spray into a nostril  If no response after 2-3 minutes, give another dose in the other nostril using a new spray     Patient not taking: Reported on 9/14/2022   neomycin-bacitracin-polymyxin (NEOSPORIN) 5-400-5,000 ointment   No No   Sig: Apply topically 3 (three) times a day Patient not taking: Reported on 2022   oxyCODONE (Roxicodone) 5 immediate release tablet   No No   Si 5 mg to 5 mg PO every 6 hours hours as needed for moderate to severe pain  Ongoing therapy  Patient not taking: Reported on 2022   pantoprazole (PROTONIX) 40 mg tablet   No No   Sig: Take 1 tablet (40 mg total) by mouth daily in the early morning   Patient not taking: Reported on 2022   polyethylene glycol (MIRALAX) 17 g packet   No No   Sig: Take 17 g by mouth daily as needed (Constipation)   Patient not taking: Reported on 2022   raNITIdine HCl (ZANTAC PO) 2023  Yes Yes   Sig: Take by mouth   senna-docusate sodium (SENOKOT S) 8 6-50 mg per tablet   No No   Sig: Take 1 tablet by mouth 2 (two) times a day   Patient not taking: Reported on 2022   sodium chloride (OCEAN) 0 65 % nasal spray   No No   Si spray into each nostril as needed for congestion   Patient not taking: Reported on 2022      Facility-Administered Medications: None    and     Allergies   Allergen Reactions   • Dye [Iodinated Contrast Media] Anaphylaxis     IVP dye   • Bactrim [Sulfamethoxazole-Trimethoprim] Hives   • Erythromycin Hives   • Penicillins        Objective   Vitals:Blood pressure 112/72, pulse 98, temperature 98 1 °F (36 7 °C), temperature source Rectal, resp  rate 18, height 5' 3" (1 6 m), weight 65 4 kg (144 lb 2 9 oz), SpO2 96 %  ,Body mass index is 25 54 kg/m²  No intake or output data in the 24 hours ending 23 1314    Invasive Devices: Invasive Devices     Peripheral Intravenous Line  Duration           Peripheral IV 23 Right Antecubital <1 day              Physical Exam  Vitals and nursing note reviewed  Constitutional:       General: She is not in acute distress  Appearance: She is normal weight  She is not ill-appearing, toxic-appearing or diaphoretic  HENT:      Head: Normocephalic and atraumatic  Eyes:      General: No scleral icterus          Right eye: No discharge  Left eye: No discharge  Extraocular Movements: Extraocular movements intact  Conjunctiva/sclera: Conjunctivae normal    Skin:     Comments: LUE colder to touch in comparison to RUE  LUE pulse intact   Neurological:      Mental Status: She is alert  Psychiatric:         Mood and Affect: Mood normal          Behavior: Behavior normal          Thought Content: Thought content normal          Judgment: Judgment normal        Neurologic Exam     Mental Status   Patient is asleep upon entering room, easily awakens to verbal stimuli  Oriented to person, place, month, and year  Able to state what brought her into the hospital and current diagnosis  Able to name objects provided, follows central and appendicular commands, and answers all questions appropriately  No evidence of aphasia    Mild dysarthria, mouth noted to be dry     Cranial Nerves   Primary gaze midline, conjugate gaze noted   No gaze preference or forced gaze deviation     Pupils dilated, equal, round, reactive bilaterally     No visual field deficits noted     EOMs intacts, no evidence of nystagmus     Facial sensation intact to light touch bilaterally    Left lower facial weakness noted    Hearing intact      Motor Exam   Muscle bulk: normal  Overall muscle tone: normal  Right arm pronator drift: absent  Left  4/5  Left biceps and triceps strength 4+/5  Left deltoid strength 4 -/5 with giveaway    Able to minimally lift left heel off the bed for 1 second, immediately falls down to the bed  Left hip flexion strength minus/5 with giveaway  Left dorsiflexion and plantarflexion strength 5/5    RUE strength 5/5 throughout  Proximal RLE strength testing limited due to recent procedure with right groin used for access  Distal RLE strength 5/5     Sensory Exam   Sensation to light touch reportedly intact throughout  Pressure sensation reportedly intact throughout  Extinction noted in left upper and lower extremity with bilateral simultaneous stimulation     Gait, Coordination, and Reflexes     Tremor   Resting tremor: absent  No ataxia noted in RUE finger-nose testing  LUE coordination testing limited to deltoid weakness    BUE reflexes 2+ throughout  Haroon negative bilaterally  Left patellar reflex 2+, unable to assess right patellar reflex due to RLE brace  Bilateral Achilles reflexes difficulty assessing as patient was unable to relax  Negative ankle clonus bilaterally    Right toe downgoing, left toe upgoing     Lab Results: I have personally reviewed pertinent reports    Recent Results (from the past 24 hour(s))   Fingerstick Glucose (POCT)    Collection Time: 02/12/23  2:59 AM   Result Value Ref Range    POC Glucose 113 65 - 140 mg/dl   Basic metabolic panel    Collection Time: 02/12/23  3:02 AM   Result Value Ref Range    Sodium 141 135 - 147 mmol/L    Potassium 3 9 3 5 - 5 3 mmol/L    Chloride 109 (H) 96 - 108 mmol/L    CO2 28 21 - 32 mmol/L    ANION GAP 4 4 - 13 mmol/L    BUN 13 5 - 25 mg/dL    Creatinine 0 94 0 60 - 1 30 mg/dL    Glucose 98 65 - 140 mg/dL    Calcium 10 8 (H) 8 4 - 10 2 mg/dL    eGFR 62 ml/min/1 73sq m   CBC and Platelet    Collection Time: 02/12/23  3:02 AM   Result Value Ref Range    WBC 6 86 4 31 - 10 16 Thousand/uL    RBC 4 66 3 81 - 5 12 Million/uL    Hemoglobin 14 5 11 5 - 15 4 g/dL    Hematocrit 44 5 34 8 - 46 1 %    MCV 96 82 - 98 fL    MCH 31 1 26 8 - 34 3 pg    MCHC 32 6 31 4 - 37 4 g/dL    RDW 13 8 11 6 - 15 1 %    Platelets 280 507 - 590 Thousands/uL    MPV 9 8 8 9 - 12 7 fL   Protime-INR    Collection Time: 02/12/23  3:02 AM   Result Value Ref Range    Protime 12 1 11 6 - 14 5 seconds    INR 0 90 0 84 - 1 19   APTT    Collection Time: 02/12/23  3:02 AM   Result Value Ref Range    PTT 24 23 - 37 seconds   HS Troponin 0hr (reflex protocol)    Collection Time: 02/12/23  3:02 AM   Result Value Ref Range    hs TnI 0hr 12 "Refer to ACS Flowchart"- see link ng/L   FLU/RSV/COVID - if FLU/RSV clinically relevant    Collection Time: 02/12/23  3:40 AM    Specimen: Nose; Nares   Result Value Ref Range    SARS-CoV-2 Negative Negative    INFLUENZA A PCR Negative Negative    INFLUENZA B PCR Negative Negative    RSV PCR Negative Negative   Echo complete w/ contrast if indicated    Collection Time: 02/12/23 12:15 PM   Result Value Ref Range    AV peak gradient 65 mmHg    LA size 3 5 cm    LVPWd 1 00 cm    Left Atrium Area-systolic Apical Two Chamber 20 3 cm2    Left Atrium Area-systolic Four Chamber 29 8 cm2    MV E' Tissue Velocity Septal 6 cm/s    Tricuspid annular plane systolic excursion 1 59 cm    IVSd 4 71 cm    LV DIASTOLIC VOLUME (MOD BIPLANE) 2D 128 mL    LEFT VENTRICLE SYSTOLIC VOLUME (MOD BIPLANE) 2D 85 mL    Left ventricular stroke volume (2D) 43 00 mL    AV Deceleration Time 1,183 ms    EF 29 %    A4C EF 28 %    LA length (A2C) 5 30 cm    LVIDd 5 20 cm    IVS 1 1 cm    LVIDS 4 30 cm    FS 17 28 - 44 %    Ao root 3 60 cm    RVID d 3 0 cm    AV regurgitation pressure 1/2 time 343 ms    MV valve area p 1/2 method 10 48 cm2    E wave deceleration time 72 ms    MV Peak E Julian 96 cm/s    LV Systolic Volume (BP) 85 mL    LV Diastolic Volume (BP) 485 mL    RAA A4C 9 9 cm2    MV stenosis pressure 1/2 time 21 ms    LVSV, 2D 43 mL   ]  Imaging Studies: I have personally reviewed pertinent reports and I have personally reviewed pertinent films in PACS  EKG, Pathology, and Other Studies: I have personally reviewed pertinent reports  VTE Prophylaxis: Sequential compression device Gail Carranza)     Dictation voice to text software has been used in the creation of this document  Please consider this in light of any contextual or grammatical errors

## 2023-02-12 NOTE — OP NOTE
OPERATIVE REPORT  PATIENT NAME: Saskia Frank    :  1955  MRN:  [id number]   Pt Location: Interventional radiology     SURGERY DATE: 23      Preop Diagnosis:  1  Stroke, right  M1 Large Vessel Occlusion  2  Hypertension        Postop Diagnosis  1  Stroke, right  M1 Large Vessel Occlusion  2  Hypertension       Procedure:  1  Right Internal Carotid Arteriogram  2  Right MCA thrombectomy with Solitiare Stent Retreiver and Zoom  3  Right Common carotid angiogram  4  Limited Right Femoral Arteriogram  5  Ultrasound guidance for right radial access  6  Complex arterial access increasing length of procedure by greater than 2 fold  Surgeon:   Vanessa Qureshi MD     Specimen(s):  None     Estimated Blood Loss:   None     Drains:  None     Anesthesia Type:   Monitored Anesthesia Care     Complications:  None     Operative Indications:  Saskia Frank is a very pleasant 75 y o  female who presented with a right MCA syndrome  NIHSS was 13  CTA was significant for right M 1 LVO  The last known well was 9 hours prior  After speaking to his POA the patient was brought emergently for thrombectomy  They understood the risks bleeding, stroke, groin hematoma, and death  Procedure Details:  After obtaining written informed consent, the patient was brought into the operating room and moved to the OR table in supine fashion  The groin was prepped and draped in the usual sterile fashion  Monitored anesthesia care was induced  5cc of intradermal lidocaine was infused into the right femoral area and percutaneous access with a 5-Romanian micropuncture kit was obtained into the right femoral artery  A 6-Romanian introducer sheath was placed  This was then exchanged for a Neuron 088  A N3TWORK Selector catheter was then advanced over the aortic arch over an [employer]  At this juncture multiple attempts were made to catheterize the right common carotid artery    This was made difficult by a proximal brachiocephalic loop and angle of the right common carotid artery  Multiple catheters and attempts were made  After approximately 20 minutes I elected to prep the right radial artery and a cocktail of nitroglycerin, heparin, and verapamil were slowly infused after percutaneous access was gained with ultrasound guidance to guide my needle and wire  The Jones catheter was then advanced through a ballast sheath and reformed within the aortic arch  I once again had significant difficulty with the proximal brachiocephalic loop however I was able to eventually catheterize the right common carotid artery  Unfortunately I was unable to advance a wire without losing access due to the acute angle of the right common carotid  At this juncture I returned my attention to the femoral access  Using a 4 Maori glide catheter and fluoroscopy I was able to eventually catheterize the right common carotid artery  The sheath was brought into the right ICA    AP images of the right intracranial carotid circulation were obtained  Next a Zoom 71 was advanced through the [place], over a C8 MediSensorsman microcatheter over a Synchro2 support microwire  The clot was passed and a 6 x 40 Solitaire stent retriever was deployed  Suction was allowed to sit in place and the system was removed under fluoroscopy and under constant suction  An AP right ICA angiogram was preformed  A right common carotid angiogram was preformed  The Neuron sheath was then withdrawn and a limited right femoral arteriogram was run  Puncture site was found to be compatible with a Mynx Closure device and this was done successfully  There was appropriate hemostasis  Radial access was closed with a TR band  The patient was then awoken from his monitored anesthesia care and found to be in his neurologic baseline  All sponge and needle counts were correct           INTERPRETATION OF ANGIOGRAPHIC FINDINGS:   1 the right subclavian has antegrade flow   There is tortuosity of the brachiocephalic and acute angulation of the right common carotid artery    2  The right internal carotid artery circulation reveals antegrade flow into the M1 with abrupt cut off   The capillary and venous phases are unremarkable  2  First pass post-angiogram  reveals TICI 3 revascularization  3   Right common carotid angiogram demonstrates no hemodynamic stenosis as defined by NASCET criteria of the internal carotid  4  Limited Right femoral arteriogram reveals normal puncture site anatomy  5   Right radial artery is small in caliber which improves at the brachial artery  Impression:  TICI 3 revascularization of a right m1 occlusion  Important times: In department: 503  Acess: 511 femoral/535 radial   first pass: 625, recan:625  TICI pre-0, TICI post-3         Patient Disposition:  Critical Care Unit     SIGNATURE: Baron Adrian MD  DATE: 02/12/23    TIME: 7:01 AM

## 2023-02-12 NOTE — ASSESSMENT & PLAN NOTE
- History of rib and compression fractures  - On oxycodone, morphine, gabapentin, and Tylenol at home  - Pain management per primary team

## 2023-02-12 NOTE — QUICK NOTE
Stroke alert 2:55 am  Neurology call back 2:55 am  nih 13  Ct head showing rt temporoparietal infarct per report and cta is showing rt m1 distal occlusion  Pt was normal until 6 pm last night when she fell then noticed the left sided symptoms  She was able to communicate this herself to the ED doc running the stroke alert  Symptoms slowly progressed  Unclear if she herself called EMS or her brother  She has plegic on the left side now, is dysarthric, rt gaze preference  Not an antiplatelet therapy or anticoagulation per chart review  No hx of dementia per chart review  Nothing to suggest she has impaired baseline function  History of 2-d echo in the past with dilated left atrium  sbp 138/75  Per my review of ct head it doesn't appear that superior division rt mca has yet to be affected  My suspicion is for penumbra to be present in addition to the infarcted inferior division rt mca area  Endovascular alert activated at 4:10 am     Acute rt mca stroke secondary to m1 occlusion  PAF is high in differential given known dilated left atrium  Recommended to start vasopressor with kelvin and shoot for sbp near 180 and place patient in trendelenburg position    Pt is not an iv tnk candidate  Pt will come to [place] for ct perfusion scan then possible thrombectomy attempt  Will need to go to [place] [facility] regardless of ct perfusion outcome

## 2023-02-12 NOTE — ANESTHESIA PREPROCEDURE EVALUATION
Procedure:  IR STROKE ALERT    Relevant Problems   CARDIO   (+) CHF (congestive heart failure) (HCC)      GI/HEPATIC   (+) Gastroesophageal reflux disease without esophagitis   (+) Liver contusion      /RENAL   (+) LAILA (acute kidney injury) ([facility] [address] )      HEMATOLOGY   (+) Anemia      MUSCULOSKELETAL   (+) Cervical spondylosis   (+) Lumbar spondylosis   (+) Primary generalized (osteo)arthritis   (+) Rheumatoid arthritis (HCC)      NEURO/PSYCH   (+) Chronic pain syndrome   (+) Hx of compression fracture of spine      PULMONARY   (+) Asthma   (+) Traumatic pneumothorax        Physical Exam    Airway    Mallampati score: II  TM Distance: >3 FB  Neck ROM: full     Dental   No notable dental hx     Cardiovascular  Cardiovascular exam normal    Pulmonary  Pulmonary exam normal     Other Findings     Technically difficult study  •  Left Ventricle: Left ventricular cavity size is normal  There is mild concentric hypertrophy  The left ventricular ejection fraction is 63% by visual estimation  Systolic function is normal  Wall motion is normal  Diastolic function is mildly abnormal, consistent with grade I (abnormal) relaxation  •  Right Ventricle: Right ventricular cavity size is normal  Systolic function is normal   •  Left Atrium: The atrium is mildly dilated  •  Aortic Valve: There is mild to moderate regurgitation  The valve appears sclerotic  •  Mitral Valve: There is mild annular calcification  There is trace regurgitation  •  Aorta: The aortic root is normal in size  The ascending aorta is ectatic at 3 6 cm  •  There is no study for comparison  EKG  NSR  Anesthesia Plan  ASA Score- 3 Emergent    Anesthesia Type- IV sedation with anesthesia with ASA Monitors  Additional Monitors:   Airway Plan:           Plan Factors-Exercise tolerance (METS): >4 METS  Chart reviewed  EKG reviewed  Imaging results reviewed  Existing labs reviewed  Patient summary reviewed  Patient is not a current smoker   Patient not instructed to abstain from smoking on day of procedure  Patient did not smoke on day of surgery  Induction- intravenous  Postoperative Plan-     Informed Consent- Anesthetic plan and risks discussed with patient  I personally reviewed this patient with the CRNA  Discussed and agreed on the Anesthesia Plan with the CRNA  Ladonna Monroe         SHAR    Chronic narcotics    Bipolar

## 2023-02-12 NOTE — ASSESSMENT & PLAN NOTE
- BP on presentation to [facility] 133/75  - SBP goal <160  - Management per ICU team

## 2023-02-12 NOTE — SPEECH THERAPY NOTE
Speech-Language Pathology Bedside Swallow Evaluation      Patient Name: Yany Arreaga    ADXCD'P Date: 2/12/2023     Problem List  Principal Problem:    Stroke [facility])  Active Problems:    Chronic pain syndrome    Bipolar depression ([facility] [address] )    HTN (hypertension)      Past Medical History  Past Medical History:   Diagnosis Date   • Anxiety    • Asthma    • Bipolar depression ([facility] [address] )    • Chronic narcotic dependence ([facility] )    • Chronic pain    • Depression    • Enterovirus heart infection    • SHAR (obstructive sleep apnea)    • Osteoarthritis    • Peripheral neuropathy    • Plantar fasciitis of right foot    • Senile osteoporosis    • Seronegative arthropathy of multiple sites ([facility] )    • Undifferentiated connective tissue disease ([facility] )        Past Surgical History  Past Surgical History:   Procedure Laterality Date   • BREAST IMPLANT     • FIXATION KYPHOPLASTY LUMBAR SPINE     • HYSTERECTOMY     • INCONTINENCE SURGERY N/A    • NASAL SEPTOPLASTY W/ TURBINOPLASTY N/A    • RECTAL PROLAPSE REPAIR N/A    • REPAIR RECTOCELE     • RHINOPLASTY N/A        Summary   Pt presented with s/s suggestive of moderate oral and suspected moderate pharyngeal dysphagia  The patient is assessed with puree and soft solids with thin liquids  Bite strength is adequate  The patient has left side anterior leakage with cracker with prolonged mastication and decreased bolus breakdown  The patient has oral residue with cracker, and swallows large, only partially masticated piece  She takes thin liquids via straw  Swallow is audible and incoordinated  The patient has a cough on 1 of 3 sips  Patient has history of dysphagia and aspiration  Previous VBS and LVH:   VBS 5/26/2021:    Overall there was prominent cricopharyngeal impression     Thin liquids by cup: Aspiration with cough   Thin liquids by cup, small sip with oral prep: Transient penetration x1 and   silent aspiration x1   Nectar consistency by cup: Transient penetration   Puree: Transient penetration  Mild vallecular and piriform sinus retention   Mixed consistency: Silent aspiration of fluid  Moderate vallecular retention of   fruit  Mixed consistency, small-volume: Transient penetration and vallecular retention   Nectar wash by cup: Silent aspiration of retained fluid  Nectar consistency by cup: Transient penetration   IMPRESSION:   IMPRESSION: Episodes of aspiration and penetration as above  Please see speech   pathology report for recommendations  VBS 10/30/2018:   Deep penetration to the level of the cords with thin liquids  Mild to moderate   penetration, the bedside is seen with nectar consistency  Transient penetration   with the liquid component of mixed solid consistency  Please see detailed speech   and swallow report  Risk/s for Aspiration: high      Recommended Diet: puree/level 1 diet and thin liquids   Recommended Form of Meds: whole with puree   Aspiration precautions and swallowing strategies: upright posture and small bites/sips  Other Recommendations: Continue frequent oral care; consider repeat VBS    Current Medical Status  HX and PE limited by: None  Venecia St is a 75 y o  female with a PMHx of chronic pain disorder, bipolar, anxiety/depression, asthma, SHAR, seronegative arthropathy, and undifferentiated CTD who initially presented to [facility] due to acute onset of left sided weakness, slurred speech, and ataxia  Symptoms led to patient tripping and falling  Patient's brother initially called EMS due to concern for potential overdose  Last known normal 6PM on 2/11/2023  NIH 13 on arrival  Stroke Alert called at 2:55 AM  CT head completed and revealed area of loss of the gray-white matter differentiation involving the right parietotemporal region suspicious for acute ischemia  CTA head/neck confirmed a distal right MCA M1 segment with abrupt cut off and collateralized blood flow tihin the distal right MCA branches   Endovascular alert activated at 4:10 AM  No TNK administered as patient was out of administration window  Patient transferred [facility] for CT perfusion scan and possible thrombectomy  Perfusion scan deferred on arrival  Patient now presents to the neuro ICU for continued monitoring following angiogram resulting in TICI 3 revascularization of right M1 occlusion  Current Precautions:  Fall  Aspiration    Allergies:  No known food allergies  Past medical history:  Please see H&P for details    Special Studies:  CT chest 2/12/2023:   LUNGS:  Atelectatic changes are noted at both lung bases  There is no infiltrate or pleural effusion  No pneumothorax  There is mild parenchymal scarring at the posterior right lung base  There is a stable partially calcified right lower lobe   granuloma measuring 10 mm, series 3 image 109  There is no tracheal or endobronchial lesion  Social/Education/Vocational Hx:  Pt lives with family    Swallow Information   Current Risks for Dysphagia & Aspiration: CVA, known history of dysphagia, known history of aspiration and dysarthria  Current Symptoms/Concerns: coughing with po  Current Diet: NPO   Baseline Diet: regular diet and thin liquids    Baseline Assessment   Behavior/Cognition: alert  Speech/Language Status: able to participate in conversation and able to follow commands  Patient Positioning: upright in bed  Pain Status/Interventions/Response to Interventions: No report of or nonverbal indications of pain  Swallow Mechanism Exam  Facial: left facial droop  Labial: decreased ROM right side  Lingual: WFL  Velum: symmetrical  Mandible: decreased ROM left side  Dentition: adequate  Vocal quality:clear/adequate   Volitional Cough: strong/productive   Respiratory Status: on RA       Consistencies Assessed and Performance   Consistencies Administered: thin liquids, puree and soft solids  Materials administered included applesauce and shonda cracker with thin liquids     Oral Stage: moderate  Retrieval is adequate  Patient has anterior leakage on left with shonda cracker and thin liquids  Mastication time is prolonged  Bolus breakdown is reduced  Patient has lingual residue with cracker and swallows larger piece  Suspect decreased oral control across all consistencies  Pharyngeal Stage: moderate  Swallow Mechanics:  Swallowing initiation appeared delayed  Laryngeal rise was palpated and judged to be reduced  Swallow is audible with thin liquids  Cough x1 observed with thin liquids  Esophageal Concerns: none reported    Summary and Recommendations (see above)    Results Reviewed with: patient, RN and MD     Treatment Recommended: dysphagia therapy      Frequency of treatment: 3-5x week, as able     Patient Stated Goal: none stated     Dysphagia LTG  -Patient will demonstrate safe and effective oral intake (without overt s/s significant oral/pharyngeal dysphagia including s/s penetration or aspiration) for the highest appropriate diet level       Short Term Goals:  -Pt will tolerate Dysphagia 1/pureed diet and liquid with no significant s/s oral or pharyngeal dysphagia across 1-3 diagnostic session/s    -Patient will tolerate trials of upgraded food and/or liquid texture with no significant s/s of oral or pharyngeal dysphagia including aspiration across 1-3 diagnostic sessions     -Patient will comply with a Video/Modified Barium Swallow study for more complete assessment of swallowing anatomy/physiology/aspiration risk and to assess efficacy of treatment techniques so as to best guide treatment plan    Speech Therapy Prognosis   Prognosis: fair    Prognosis Considerations: medical status, prior medical history and medically fragile status

## 2023-02-12 NOTE — EMTALA/ACUTE CARE TRANSFER
[facility]  [address] [id number]  Dept: [phone]      EMTALA TRANSFER CONSENT    NAME Justine Davila                                         1955                              MRN [id number]    I have been informed of my rights regarding examination, treatment, and transfer   by Dr Ailyn Cole: Specialized equipment and/or services available at the receiving facility (Include comment)________________________ (Neurosurgery)    Risks: Potential for delay in receiving treatment, Potential deterioration of medical condition, Loss of IV, Increased discomfort during transfer, Possible worsening of condition or death during transfer      Transfer Request   I acknowledge that my medical condition has been evaluated and explained to me by the emergency department physician or other qualified medical person and/or my attending physician who has recommended and offered to me further medical examination and treatment  I understand the Hospital's obligation with respect to the treatment and stabilization of my emergency medical condition  I nevertheless request to be transferred  I release the Hospital, the doctor, and any other persons caring for me from all responsibility or liability for any injury or ill effects that may result from my transfer and agree to accept all responsibility for the consequences of my choice to transfer, rather than receive stabilizing treatment at the Hospital  I understand that because the transfer is my request, my insurance may not provide reimbursement for the services  The Hospital will assist and direct me and my family in how to make arrangements for transfer, but the hospital is not liable for any fees charged by the transport service    In spite of this understanding, I refuse to consent to further medical examination and treatment which has been offered to me, and request transfer to Accepting Facility Name, [facility] : [address] [place] [address]  I authorize the performance of emergency medical procedures and treatments upon me in both transit and upon arrival at the receiving facility  Additionally, I authorize the release of any and all medical records to the receiving facility and request they be transported with me, if possible  I authorize the performance of emergency medical procedures and treatments upon me in both transit and upon arrival at the receiving facility  Additionally, I authorize the release of any and all medical records to the receiving facility and request they be transported with me, if possible  I understand that the safest mode of transportation during a medical emergency is an ambulance and that the Hospital advocates the use of this mode of transport  Risks of traveling to the receiving facility by car, including absence of medical control, life sustaining equipment, such as oxygen, and medical personnel has been explained to me and I fully understand them  (STANLEY CORRECT BOX BELOW)  [  ]  I consent to the stated transfer and to be transported by ambulance/helicopter  [  ]  I consent to the stated transfer, but refuse transportation by ambulance and accept full responsibility for my transportation by car  I understand the risks of non-ambulance transfers and I exonerate the Hospital and its staff from any deterioration in my condition that results from this refusal     X___________________________________________    DATE  23  TIME________  Signature of patient or legally responsible individual signing on patient behalf           RELATIONSHIP TO PATIENT_________________________          Provider Certification    NAME Georginaa Apley                                        [facility] 1955                              MRN [id number]    A medical screening exam was performed on the above named patient    Based on the examination:    Condition Necessitating Transfer The encounter diagnosis was Stroke-like episode  Patient Condition: The patient has been stabilized such that within reasonable medical probability, no material deterioration of the patient condition or the condition of the unborn child(michael) is likely to result from the transfer    Reason for Transfer: Level of Care needed not available at this facility    Transfer Requirements: [address] [place] [address]   · Space available and qualified personnel available for treatment as acknowledged by    · Agreed to accept transfer and to provide appropriate medical treatment as acknowledged by       Dr Sally Black  · Appropriate medical records of the examination and treatment of the patient are provided at the time of transfer   [address] [facility], [address] _______  · Transfer will be performed by qualified personnel from    and appropriate transfer equipment as required, including the use of necessary and appropriate life support measures      Provider Certification: I have examined the patient and explained the following risks and benefits of being transferred/refusing transfer to the patient/family:  General risk, such as traffic hazards, adverse weather conditions, rough terrain or turbulence, possible failure of equipment (including vehicle or aircraft), or consequences of actions of persons outside the control of the transport personnel, Unanticipated needs of medical equipment and personnel during transport, Risk of worsening condition, The possibility of a transport vehicle being unavailable      Based on these reasonable risks and benefits to the patient and/or the unborn child(michael), and based upon the information available at the time of the patient’s examination, I certify that the medical benefits reasonably to be expected from the provision of appropriate medical treatments at another medical facility outweigh the increasing risks, if any, to the individual’s medical condition, and in the case of labor to the unborn child, from effecting the transfer      X____________________________________________ DATE 02/12/23        TIME_______      ORIGINAL - SEND TO MEDICAL RECORDS   COPY - SEND WITH PATIENT DURING TRANSFER

## 2023-02-13 ENCOUNTER — APPOINTMENT (INPATIENT)
Dept: RADIOLOGY | Facility: HOSPITAL | Age: 68
End: 2023-02-13

## 2023-02-13 ENCOUNTER — TELEPHONE (OUTPATIENT)
Dept: NEUROSURGERY | Facility: CLINIC | Age: 68
End: 2023-02-13

## 2023-02-13 PROBLEM — I50.20 HFREF (HEART FAILURE WITH REDUCED EJECTION FRACTION) (HCC): Status: ACTIVE | Noted: 2023-02-13

## 2023-02-13 LAB
ANION GAP SERPL CALCULATED.3IONS-SCNC: 5 MMOL/L (ref 4–13)
ATRIAL RATE: 80 BPM
BASOPHILS # BLD AUTO: 0.02 THOUSANDS/ÂΜL (ref 0–0.1)
BASOPHILS NFR BLD AUTO: 0 % (ref 0–1)
BUN SERPL-MCNC: 11 MG/DL (ref 5–25)
CALCIUM SERPL-MCNC: 9.6 MG/DL (ref 8.3–10.1)
CHLORIDE SERPL-SCNC: 117 MMOL/L (ref 96–108)
CHOLEST SERPL-MCNC: 181 MG/DL
CO2 SERPL-SCNC: 21 MMOL/L (ref 21–32)
CREAT SERPL-MCNC: 0.75 MG/DL (ref 0.6–1.3)
EOSINOPHIL # BLD AUTO: 0 THOUSAND/ÂΜL (ref 0–0.61)
EOSINOPHIL NFR BLD AUTO: 0 % (ref 0–6)
ERYTHROCYTE [DISTWIDTH] IN BLOOD BY AUTOMATED COUNT: 14.2 % (ref 11.6–15.1)
EST. AVERAGE GLUCOSE BLD GHB EST-MCNC: 97 MG/DL
GFR SERPL CREATININE-BSD FRML MDRD: 82 ML/MIN/1.73SQ M
GLUCOSE SERPL-MCNC: 95 MG/DL (ref 65–140)
HBA1C MFR BLD: 5 %
HCT VFR BLD AUTO: 41.5 % (ref 34.8–46.1)
HDLC SERPL-MCNC: 81 MG/DL
HGB BLD-MCNC: 13.5 G/DL (ref 11.5–15.4)
IMM GRANULOCYTES # BLD AUTO: 0.03 THOUSAND/UL (ref 0–0.2)
IMM GRANULOCYTES NFR BLD AUTO: 0 % (ref 0–2)
LDLC SERPL CALC-MCNC: 84 MG/DL (ref 0–100)
LYMPHOCYTES # BLD AUTO: 0.94 THOUSANDS/ÂΜL (ref 0.6–4.47)
LYMPHOCYTES NFR BLD AUTO: 12 % (ref 14–44)
MAGNESIUM SERPL-MCNC: 1.7 MG/DL (ref 1.6–2.6)
MCH RBC QN AUTO: 30.8 PG (ref 26.8–34.3)
MCHC RBC AUTO-ENTMCNC: 32.5 G/DL (ref 31.4–37.4)
MCV RBC AUTO: 95 FL (ref 82–98)
MONOCYTES # BLD AUTO: 0.65 THOUSAND/ÂΜL (ref 0.17–1.22)
MONOCYTES NFR BLD AUTO: 8 % (ref 4–12)
NEUTROPHILS # BLD AUTO: 6.23 THOUSANDS/ÂΜL (ref 1.85–7.62)
NEUTS SEG NFR BLD AUTO: 80 % (ref 43–75)
NRBC BLD AUTO-RTO: 0 /100 WBCS
P AXIS: 58 DEGREES
PHOSPHATE SERPL-MCNC: 3.2 MG/DL (ref 2.3–4.1)
PLATELET # BLD AUTO: 197 THOUSANDS/UL (ref 149–390)
PMV BLD AUTO: 10.3 FL (ref 8.9–12.7)
POTASSIUM SERPL-SCNC: 3.8 MMOL/L (ref 3.5–5.3)
PR INTERVAL: 158 MS
QRS AXIS: 32 DEGREES
QRSD INTERVAL: 88 MS
QT INTERVAL: 356 MS
QTC INTERVAL: 410 MS
RBC # BLD AUTO: 4.38 MILLION/UL (ref 3.81–5.12)
SODIUM SERPL-SCNC: 143 MMOL/L (ref 135–147)
T WAVE AXIS: 251 DEGREES
TRIGL SERPL-MCNC: 81 MG/DL
VENTRICULAR RATE: 80 BPM
WBC # BLD AUTO: 7.87 THOUSAND/UL (ref 4.31–10.16)

## 2023-02-13 RX ORDER — MORPHINE SULFATE 15 MG/1
15 TABLET ORAL EVERY 6 HOURS PRN
Status: DISCONTINUED | OUTPATIENT
Start: 2023-02-13 | End: 2023-02-13

## 2023-02-13 RX ORDER — MORPHINE SULFATE 15 MG/1
15 TABLET, FILM COATED, EXTENDED RELEASE ORAL EVERY 12 HOURS SCHEDULED
Status: DISCONTINUED | OUTPATIENT
Start: 2023-02-13 | End: 2023-02-16 | Stop reason: HOSPADM

## 2023-02-13 RX ORDER — FAMOTIDINE 20 MG/1
20 TABLET, FILM COATED ORAL ONCE
Status: COMPLETED | OUTPATIENT
Start: 2023-02-13 | End: 2023-02-13

## 2023-02-13 RX ORDER — MAGNESIUM HYDROXIDE/ALUMINUM HYDROXICE/SIMETHICONE 120; 1200; 1200 MG/30ML; MG/30ML; MG/30ML
30 SUSPENSION ORAL EVERY 4 HOURS PRN
Status: DISCONTINUED | OUTPATIENT
Start: 2023-02-13 | End: 2023-02-16 | Stop reason: HOSPADM

## 2023-02-13 RX ADMIN — ATORVASTATIN CALCIUM 40 MG: 40 TABLET, FILM COATED ORAL at 17:08

## 2023-02-13 RX ADMIN — SODIUM CHLORIDE 75 ML/HR: 0.9 INJECTION, SOLUTION INTRAVENOUS at 00:48

## 2023-02-13 RX ADMIN — HYDROXYCHLOROQUINE SULFATE 200 MG: 200 TABLET ORAL at 09:33

## 2023-02-13 RX ADMIN — OXYCODONE HYDROCHLORIDE 5 MG: 5 TABLET ORAL at 06:29

## 2023-02-13 RX ADMIN — OXYCODONE HYDROCHLORIDE 5 MG: 5 TABLET ORAL at 20:03

## 2023-02-13 RX ADMIN — GABAPENTIN 300 MG: 300 CAPSULE ORAL at 09:32

## 2023-02-13 RX ADMIN — CHLORHEXIDINE GLUCONATE 0.12% ORAL RINSE 15 ML: 1.2 LIQUID ORAL at 09:40

## 2023-02-13 RX ADMIN — DULOXETINE HYDROCHLORIDE 60 MG: 60 CAPSULE, DELAYED RELEASE ORAL at 17:08

## 2023-02-13 RX ADMIN — DULOXETINE HYDROCHLORIDE 60 MG: 60 CAPSULE, DELAYED RELEASE ORAL at 09:32

## 2023-02-13 RX ADMIN — MELATONIN 6 MG: at 00:48

## 2023-02-13 RX ADMIN — FAMOTIDINE 20 MG: 20 TABLET, FILM COATED ORAL at 09:10

## 2023-02-13 RX ADMIN — GABAPENTIN 300 MG: 300 CAPSULE ORAL at 17:09

## 2023-02-13 RX ADMIN — BUPROPION HYDROCHLORIDE 100 MG: 100 TABLET, FILM COATED ORAL at 09:32

## 2023-02-13 RX ADMIN — LAMOTRIGINE 150 MG: 100 TABLET ORAL at 23:17

## 2023-02-13 RX ADMIN — MORPHINE SULFATE 15 MG: 15 TABLET, FILM COATED, EXTENDED RELEASE ORAL at 13:47

## 2023-02-13 RX ADMIN — MELATONIN 6 MG: at 23:17

## 2023-02-13 RX ADMIN — ENOXAPARIN SODIUM 40 MG: 40 INJECTION SUBCUTANEOUS at 09:32

## 2023-02-13 RX ADMIN — ALUMINA, MAGNESIA, AND SIMETHICONE ORAL SUSPENSION REGULAR STRENGTH 30 ML: 1200; 1200; 120 SUSPENSION ORAL at 23:22

## 2023-02-13 RX ADMIN — MORPHINE SULFATE 15 MG: 15 TABLET, FILM COATED, EXTENDED RELEASE ORAL at 23:17

## 2023-02-13 NOTE — PROGRESS NOTES
[facility]  Progress Note - Paramjit Suarez 1955, 75 y o  female MRN: [id number]  Unit/Bed#: ICU 06 Encounter: [id number]  Primary Care Provider: Ibrahima Gerardo,    Date and time admitted to hospital: 2/12/2023  5:00 AM    * Stroke [facility])  Assessment & Plan  Acute R MCA CVA, M1 occlusion   PPD#1 - mechanical thrombectomy w/ TICI 3 revascularization of a right m1 occlusion (Dr Serrano Jointer 2/12/2023)  · Presented with right MCA syndrome, with initial NIH 13  · Not a tnk candidate on arrival     Imaging:  · 2/13 CT head: Evolving right MCA territory infarcts with associated hyperdensity likely representing a combination of resolving retained contrast and superimposed petechial hemorrhage  · 2/12 MRI brain: Acute to subacute right MCA territory infarct with superimposed petechial hemorrhage  A 0 5 cm punctate focus of acute to subacute ischemia is noted within the right cerebellum  · CTA stroke alert 2/12/2023: Abrupt cut off of flow at the distal right MCA M1 segment compatible with an acute infarct  Collateralized blood flow is noted within the distal right MCA branches  3 mm saccular aneurysm arising from the medial aspect of the proximal right ICA supraclinoid segment  No hemodynamically significant stenosis within either common or internal carotid artery  Less than 50% stenosis by NASCET criteria      Plan:  • Continue to closely monitor neuro exam   - Frequent neuro checks per primary team   - Repeat STAT CTH with any acute decline   • Maintain SBP goal up to160   • No further neurosurgical intervention indicated at this time   - Repeat CTH and MRI reviewed, R MCA distribution stroke noted with small amount of hemorrhage within stroke bed  • R groin access site without active bleeding or hematoma, mild tenderness, continue local wound care to site    • Continue on stroke pathway/stroke management per neurology and MCCS   • DVT ppx: SCDs, lovenox   • Pain control per primary team  • Mobilize as tolerated with assistance, PT/OT/speech evaluation evaluation  • Continue medical management per primary team   • social work following for assistance with dispo once medically stable     Neurosurgery will sign off at this time  Pt will be seen in the nsgy office for a 3 month clinical follow-up appointment  Pt should be followed by neurology as an outpatient for management of her stroke  Please reach out with any further questions or concerns  Subjective/Objective   Chief Complaint: "I have to go to the bathroom"     Subjective: Pt seen and examined this am on rounds  NAEO  Pt improved post MT  She has had improvement in motor function and sensation to the L side  Pt complains of a mild headache that improves with tylenol and states she needs to use the bathroom  Otherwise offers no complaints  Pt does ask about when her speech will start to get better  I explained the typical course of stroke management and recovery to pt, but also explained it varies between patients  I informed her that she will undergo PT/OT/speech therapy and hopefully see improvement in her speech with time  Pt denies any vision changes, dizziness, N/V, numbness  States her left sided weakness has significantly improved  Objective: middle aged female resting comfortably in bed  In no acute distress  Appears to be in good spirits today  I/O       02/11 0701  02/12 0700 02/12 0701  02/13 0700 02/13 0701 02/14 0700    P  O   200     I V  (mL/kg)  1989 2 (30 4)     Total Intake(mL/kg)  2189 2 (33 5)     Urine (mL/kg/hr)  500 (0 3)     Total Output  500     Net  +1689 2            Unmeasured Urine Occurrence  1 x 1 x        Invasive Devices     Peripheral Intravenous Line  Duration           Peripheral IV 02/12/23 Left Forearm <1 day    Peripheral IV 02/13/23 Left Antecubital <1 day          Drain  Duration           External Urinary Catheter <1 day              Physical Exam:  Vitals: Blood pressure 132/78, pulse 102, temperature 98 1 °F (36 7 °C), resp  rate 12, height 5' 3" (1 6 m), weight 65 4 kg (144 lb 2 9 oz), SpO2 97 %  ,Body mass index is 25 54 kg/m²  General appearance: alert, appears stated age, cooperative and no distress  Head: Normocephalic, without obvious abnormality, atraumatic  Eyes: EOMI, PERRL  No gaze preference     Neck: supple, symmetrical, trachea midline and NT  Back: no kyphosis present, no tenderness to percussion or palpation  Lungs: non labored breathing, no resp distress on room air   Heart: regular heart rate  Neurologic:   Mental status: Alert, oriented x3, thought content appropriate  - appropriately answering questions and following commands   - dysarthric   Cranial nerves: subtle R facial droop   Sensory: normal to light touch dorian   Motor:   - LUE weakness distal > proximal, LUE  4/5, LUE wrist 4/5, LUE biceps/triceps/delt 4+-5/5   - RUE with intact strength 5/5   - LLE rated 3/5 at hip and knee (somewhat pain limited), ankle 4/5  - RLE with intact strength 5/5 throughout   Reflexes: 2+ and symmetric  Coordination:  - L sided drift and ataxia   Skin: R groin access site with mild TTP, no active bleeding or drainage, no surrounding ecchymosis or hematoma       Lab Results:  Results from last 7 days   Lab Units 02/13/23  0512 02/12/23  0302   WBC Thousand/uL 7 87 6 86   HEMOGLOBIN g/dL 13 5 14 5   HEMATOCRIT % 41 5 44 5   PLATELETS Thousands/uL 197 216   NEUTROS PCT % 80*  --    MONOS PCT % 8  --      Results from last 7 days   Lab Units 02/13/23  0512 02/12/23  0302   POTASSIUM mmol/L 3 8 3 9   CHLORIDE mmol/L 117* 109*   CO2 mmol/L 21 28   BUN mg/dL 11 13   CREATININE mg/dL 0 75 0 94   CALCIUM mg/dL 9 6 10 8*     Results from last 7 days   Lab Units 02/13/23  0512   MAGNESIUM mg/dL 1 7     Results from last 7 days   Lab Units 02/13/23  0512   PHOSPHORUS mg/dL 3 2     Results from last 7 days   Lab Units 02/12/23  0302   INR  0 90   PTT seconds 24     No results found for: TROPONINT  ABG:No results found for: PHART, VWT9LQI, PO2ART, CAM1ZVC, R9GEBKUG, BEART, SOURCE    Imaging Studies: I have personally reviewed pertinent reports  and I have personally reviewed pertinent films in PACS    CT head wo contrast    Result Date: 2/13/2023  Impression: Evolving right MCA territory infarcts with associated hyperdensity likely representing a combination of resolving retained contrast and superimposed petechial hemorrhage  Follow-up imaging is recommended to document stability/resolution  Workstation performed: [id number]     MRI brain wo contrast    Addendum Date: 2/13/2023    ADDENDUM: A 0 5 cm punctate focus of acute to subacute ischemia is noted within the right cerebellum (series 3/10)  Result Date: 2/13/2023  Impression: Acute to subacute right MCA territory infarct with superimposed petechial hemorrhage, Workstation performed: [id number]     CT chest abdomen pelvis w contrast    Result Date: 2/12/2023  Impression: No evidence of solid organ injury  No acute intra-abdominal abnormality  No free air or free fluid  Bibasilar atelectasis with no infiltrate or consolidation  No pneumothorax  Stable chronic right posterior 10th, 11th and 12th rib fractures  Stable chronic compression fractures at T11, L1 and L4  No new fracture  Workstation performed: [id number]     CT stroke alert brain    Result Date: 2/12/2023  Impression: Area of loss of the gray-white matter differentiation involving the right parietotemporal region suspicious for acute ischemia  Occlusion of the distal right MCA M1 segment is also noted on a CTA head and neck performed at same time, compatible with an acute infarct  An MRI brain could be performed for further evaluation  No acute intracranial hemorrhage  Mild chronic small vessel ischemic changes    I personally discussed this study with Star Stallworth on 2/12/2023 at 3:20 AM  Workstation performed: [id number]     CT recon only thoracolumbar    Result Date: 2/12/2023  Impression: No fracture or traumatic subluxation  Stable chronic compression fractures at T11, L1 and L4  Stable old right transverse process fractures at L2 and L3  Workstation performed: [id number]     CTA stroke alert (head/neck)    Result Date: 2/12/2023  Impression: Abrupt cut off of flow at the distal right MCA M1 segment compatible with an acute infarct  Collateralized blood flow is noted within the distal right MCA branches  3 mm saccular aneurysm arising from the medial aspect of the proximal right ICA supraclinoid segment  No hemodynamically significant stenosis within either common or internal carotid artery  Less than 50% stenosis by NASCET criteria  I personally discussed this study with Jass Figueroa on 2/12/2023 at 3:20 AM  Workstation performed: [id number]       EKG, Pathology, and Other Studies: I have personally reviewed pertinent reports        VTE Pharmacologic Prophylaxis: Sequential compression device (Venodyne)  and Enoxaparin (Lovenox)    VTE Mechanical Prophylaxis: sequential compression device

## 2023-02-13 NOTE — PLAN OF CARE
Problem: PHYSICAL THERAPY ADULT  Goal: Performs mobility at highest level of function for planned discharge setting  See evaluation for individualized goals  Description: Treatment/Interventions: OT, Spoke to case management, Spoke to nursing, Gait training, Bed mobility, Patient/family training, Endurance training, LE strengthening/ROM, Functional transfer training          See flowsheet documentation for full assessment, interventions and recommendations  Note: Prognosis: Good  Problem List: Decreased strength, Decreased endurance, Impaired balance, Decreased mobility, Decreased coordination, Decreased safety awareness, Impaired judgement  Assessment: Pt is very pleasant and motivated 75 y o  female seen for PT evaluation s/p admit to [facility] on 2/12/2023 w/ Stroke [facility])  PT consulted to assess pt's functional mobility and d/c needs  Order placed for PT eval and tx, w/ up w/ A order  Comorbidities affecting pt's physical performance at time of assessment include:  has a past medical history of Anxiety, Asthma, Bipolar depression ([address] ), Chronic narcotic dependence ([address] ), Chronic pain, Depression, Enterovirus heart infection, SHAR (obstructive sleep apnea), Osteoarthritis, Peripheral neuropathy, Plantar fasciitis of right foot, Senile osteoporosis, Seronegative arthropathy of multiple sites ([facility] ), and Undifferentiated connective tissue disease ([facility] )  PTA, pt was ambulates community distances and elevations, lives in multi-level home and retired  Personal factors affecting pt at time of IE include: ambulating w/ assistive device, inability to ambulate household distances, decreased cognition, limited home support, positive fall history, decreased initiation and engagement, unable to perform physical activity, inability to perform IADLs and inability to perform ADLs   Please find objective findings from PT assessment regarding body systems outlined above with impairments and limitations including weakness, impaired balance, decreased endurance, impaired coordination, gait deviations, pain, decreased activity tolerance, decreased functional mobility tolerance, decreased safety awareness, impaired judgement and fall risk  Noted LLE weakness with MMT  Required instruction for hand placement with transfers with new AD use  Pt ambulated with ataxic gait requiring increased time to advance LLE despite cues to lead with weaker side  Pt with increased L lateral lean with weakness requiring frequent verbal and tactile instruction to improve upright posture  Noted to keep herself positioned to left side of the walker with decreased ability to correct with verbal and visual instruction  The following objective measures performed on IE also reveal limitations: The patient's AM-PAC Basic Mobility Inpatient Short Form Raw Score is 13, Standardized Score is 33 99  Barriers to Discharge: Decreased caregiver support, Inaccessible home environment     PT Discharge Recommendation: Post acute rehabilitation services    See flowsheet documentation for full assessment

## 2023-02-13 NOTE — ASSESSMENT & PLAN NOTE
Patient with history hypertension presented with left-sided weakness slurred speech and transferred from [facility] to [place] [id number] for neurology evaluation possible intervention  She was noted to right MCA stroke  Eval by neurology and deemed not a candidate for tPA  S/p mechanical thrombectomy  Continue atorvastatin  Neurosurgery inputs noted  Neurology plans to repeat CT head today before initiating aspirin noted  Physical therapy

## 2023-02-13 NOTE — TELEPHONE ENCOUNTER
02/16/2023-PT [facility]    02/15/2023-PT [facility] 02/14/2023-PT STILL IN HOSPITAL    02/13/2023-PT STILL IN HOSPITAL  05/15/2023 APT (NO IMAGING) Reno Calles PA-C  Can we please schedule this patient for a 3 month clinical follow-up appointment with Dr Dov Pepper in regard to R MCA CVA s/p MT?    Thank you,

## 2023-02-13 NOTE — ASSESSMENT & PLAN NOTE
Patient history of chronic pain  Prior records noted  Patient chronically on MS Contin 15 mg twice daily  Bowel regimen

## 2023-02-13 NOTE — OCCUPATIONAL THERAPY NOTE
Occupational Therapy Evaluation     Patient Name: Alexandru YBRARA Date: 2/13/2023  Problem List  Principal Problem:    Stroke [facility])  Active Problems:    Chronic pain syndrome    Seronegative arthropathy of multiple sites ([facility] [address] )    Bipolar depression ([facility] )    HTN (hypertension)    HFrEF (heart failure with reduced ejection fraction) ([facility] )    Past Medical History  Past Medical History:   Diagnosis Date    Anxiety     Asthma     Bipolar depression ([facility])     Chronic narcotic dependence ([facility])     Chronic pain     Depression     Enterovirus heart infection     SHAR (obstructive sleep apnea)     Osteoarthritis     Peripheral neuropathy     Plantar fasciitis of right foot     Senile osteoporosis     Seronegative arthropathy of multiple sites ([facility] )     Undifferentiated connective tissue disease ([facility] )      Past Surgical History  Past Surgical History:   Procedure Laterality Date    BREAST IMPLANT      FIXATION KYPHOPLASTY LUMBAR SPINE      HYSTERECTOMY      INCONTINENCE SURGERY N/A     NASAL SEPTOPLASTY W/ TURBINOPLASTY N/A     RECTAL PROLAPSE REPAIR N/A     REPAIR RECTOCELE      RHINOPLASTY N/A              02/13/23 1404   OT Last Visit   OT Visit Date 02/13/23   Note Type   Note type Evaluation   Pain Assessment   Pain Assessment Tool FLACC   Pain Location/Orientation Orientation: Left; Other (Comment)  (ribs)   [facility] Pain Intervention(s) Ambulation/increased activity   Pain Rating: FLACC (Rest) - Face 0   Pain Rating: FLACC (Rest) - Legs 0   Pain Rating: FLACC (Rest) - Activity 0   Pain Rating: FLACC (Rest) - Cry 0   Pain Rating: FLACC (Rest) - Consolability 0   Score: FLACC (Rest) 0   Pain Rating: FLACC (Activity) - Face 0   Pain Rating: FLACC (Activity) - Legs 0   Pain Rating: FLACC (Activity) - Activity 0   Pain Rating: FLACC (Activity) - Cry 0   Pain Rating: FLACC (Activity) - Consolability 0   Score: FLACC (Activity) 0   Restrictions/Precautions   Weight Bearing Precautions Per Order No   Other Precautions Chair Alarm; Bed Alarm;Multiple lines;Telemetry; Fall Risk;Pain   Home Living   Type of [address] Two level;Bed/bath upstairs  (bathroom on 1st floor, 0 TAB)   Bathroom Shower/Tub Walk-in shower   Bathroom Toilet Standard   Bathroom Equipment Grab bars in shower; Shower chair;Grab bars around toilet   [address] Walker;Cane  (uses cane at baseline)   Additional Comments Pt lives in a [facility] with 0 TAB, FF of steps inside to ambulate to bedroom  Prior Function   Level of [place] Independent with functional mobility; Needs assistance with IADLS;Needs assistance with ADLs   Lives With Family  (brother and son)   Lazarus Deans Help From Family   IADLs Family/Friend/Other provides transportation; Family/Friend/Other provides meals   Falls in the last 6 months 1 to 4  (1)   Vocational Retired   Comments Pt receives some A with ADLs and IADls  Lifestyle   Autonomy Pt receives some A with ADLs and IADLs, and uses a cane at baseline  (-)   Reciprocal Relationships Pt lives with her son and brother  Service to Others Pt is retired  Intrinsic Gratification Pt enjoys being with her grandkids  General   Additional Pertinent History has received home therapy in the past    Family/Caregiver Present No   Subjective   Subjective "I am trying"   ADL   Where Assessed Chair   Eating Assistance 5  Supervision/Setup   Grooming Assistance 4  Minimal Assistance   UB Bathing Assistance 3  Moderate Assistance   LB Bathing Assistance 4  Minimal [address] 3  Moderate Assistance   LB Dressing Assistance 4  [address]  3  Moderate Assistance   Functional Assistance 4  Minimal Assistance   Bed Mobility   Additional Comments unable to assess, pt was OOB in chair upon arrival  Pt was left sitting in chair with all necessary items within reach and chair alarm on at the end of the session     Transfers   Sit to Stand 3 Moderate assistance   Additional items Assist x 2; Increased time required   Stand to Sit 3  Moderate assistance   Additional items Assist x 2; Increased time required   Additional Comments tx with RW  Functional Mobility   Functional Mobility 3  Moderate assistance   Additional Comments Mod Ax2  Pt required vc'ing for increase awareness for L sided lean  Pt with decreased weakness on L side  Additional items Rolling walker   Balance   Static Sitting Fair   Dynamic Sitting Fair -   Static Standing Poor +   Dynamic Standing Poor   Ambulatory Poor -   Activity Tolerance   Activity Tolerance Patient limited by fatigue;Patient limited by pain   Medical Staff Made Aware Seen with PT 2* medical complexity  Nurse Made Aware RN made aware   RUE Assessment   RUE Assessment WFL   LUE Assessment   LUE Assessment X   LUE Strength   LUE Overall Strength Deficits  (decreased strength in LUE )   Hand Function   Gross Motor Coordination Impaired   Fine Motor Coordination Impaired   Hand Function Comments LUE deficitsd - fine and gross motor  Difficulty with pinch and  strength  Vision - Complex Assessment   Ocular Range of Motion Intact   Tracking Impaired  (difficulty tracking, pt states it is hard to focus eyes on finger and follow )   Psychosocial   Psychosocial (WDL) WDL   Cognition   Overall Cognitive Status WFL   Arousal/Participation Alert; Responsive;Arousable; Cooperative   Attention Within functional limits   Orientation Level Oriented X4   Memory Within functional limits   Following Commands Follows one step commands with increased time or repetition   Comments Pt was pleasant and cooperative t/o session  Pt requires vc'ing for increase awareness to  sided lean  Pt also present with slurred speech  Assessment   Limitation Decreased ADL status; Decreased UE ROM; Decreased UE strength;Decreased endurance;Decreased self-care trans;Decreased high-level ADLs   Prognosis Fair   Assessment Pt is 75 y o  female admitted to [facility] on 2/12/2023 w/ a stroke  Pt received "Right Internal Carotid Arteriogram, Right MCA thrombectomy with Solitiare Stent Retreiver and Zoom, Right Common carotid angiogram, Limited Right Femoral Arteriogram, Ultrasound guidance for right radial access, Complex arterial access increasing length of procedure" on 2/12/2023  Pt  has a past medical history of Anxiety, Asthma, Bipolar depression ([facility] ), Chronic narcotic dependence ([email or web address] ), Chronic pain, Depression, Enterovirus heart infection, SHAR (obstructive sleep apnea), Osteoarthritis, Peripheral neuropathy, Plantar fasciitis of right foot, Senile osteoporosis, Seronegative arthropathy of multiple sites ([email or web address] ), and Undifferentiated connective tissue disease ([email or web address] )    Pt with active OT orders and activity orders  Pt resides in a two story Home, with 0 TAB  Pt ambulates ~ a FF of steps to 2nd floor for bed  Pt lives with her son and brother who can A if needed  Pt was receiving some A w/ ADLs, IADLs, (-) drove  Pt is currently functioning at S for eating, min A for UB ADLs, and mod A for LB ADLs  Pt requires mod Ax2 for transfers and functional mobility  Pt requires vc'ing t/o session for increase safety awareness to L side and increased awareness of L sided lean  Pt has decreased overall strength (gross and fine motor) in LUE  Pt is limited 2* pain, endurance, activity tolerance, functional mobility, balance, trunk control, functional standing tolerance, decreased I w/ ADLS/IADLS, decreased safety awareness, decreased insight into deficits and slurred speech  The Areas of Occupational Performance Areas to address w/ pt include: eating, grooming, bathing/shower, toilet hygiene, dressing, health maintenance and functional mobility  Based off of an OT evaulation, assessment, and performance functioning, pt is identified as a high complexity  The patient's raw score on the AM-PAC Daily Activity Inpatient Short Form is 16   A raw score of less than 19 suggests the patient may benefit from discharge to post-acute rehabilitation services  Please refer to the recommendation of the Occupational Therapist for safe discharge planning  OT recommendation for d/c includes post acute rehab  Pt would benefit from continued acute OT services for  3-5x/week to  w/in 10-14 days:  to address functional goals  Goals   Patient Goals to go home   LTG Time Frame 10-14   Long Term Goal see goals below   Plan   Treatment Interventions ADL retraining;Functional transfer training;UE strengthening/ROM; Endurance training;Patient/family training;Equipment evaluation/education; Neuromuscular reeducation; Fine motor coordination activities; Compensatory technique education;Continued evaluation; Energy conservation; Activityengagement   Goal Expiration Date 23   OT Frequency 3-5x/wk   Recommendation   OT Discharge Recommendation Post acute rehabilitation services   AM-PAC Daily Activity Inpatient   Lower Body Dressing 2   Bathing 2   Toileting 2   Upper Body Dressing 3   Grooming 3   Eating 4   Daily Activity Raw Score 16   Daily Activity Standardized Score (Calc for Raw Score >=11) 35 96   AM-PAC Applied Cognition Inpatient   Following a Speech/Presentation 3   Understanding Ordinary Conversation 4   Taking Medications 4   Remembering Where Things Are Placed or Put Away 3   Remembering List of 4-5 Errands 3   Taking Care of Complicated Tasks 2   Applied Cognition Raw Score 19   Applied Cognition Standardized Score 39 77   End of Consult   Education Provided Yes   Patient Position at End of Consult Bedside chair;Bed/Chair alarm activated; All needs within reach   Nurse Communication Nurse aware of consult       Pt w/ mod I will complete daily grooming tasks w/ adaptive equipment as needed  Pt will increase standing tolerance to 3 mins w/ min A w/ adaptive equipment as needed  Pt will complete functional transfers w/ min A on and off all surfaces w/ equipment as needed       Pt will complete functional mobility w/ min A on and off all surfaces w/ equipment as needed  Pt will complete tolieting w/ min A while demonstrating safety techniques w/ DME  Pt will complete feeding tasks w/ mod I using adaptive devices  Pt will demonstrate G safety awareness w/ mod I during OT session  Pt will demonstrate LE body dressing w/ mod I w/ adaptive equipment as needed  Pt will demonstrate UE body dressing w/ min A w/ adaptive equipment as needed  Pt will increase activity tolerance to G during a 30 min OT tx session  Pt will demonstrate bed mobility skills w/ min A  Pt will participate in a formal/functional cognitive assessment as needed to assist with safe discharge planning       Claudy De Luna, OTR/L

## 2023-02-13 NOTE — PHYSICAL THERAPY NOTE
Physical Therapy Evaluation     Patient's Name: Venecia St    Admitting Diagnosis  Cerebrovascular accident (CVA), unspecified mechanism ([facility] ) [I63 [id number]]    Problem List  Patient Active Problem List   Diagnosis    Chronic pain syndrome    Asthma    Ambulatory dysfunction    Acute metabolic encephalopathy    Seronegative arthropathy of multiple sites ([facility] )    Gastroesophageal reflux disease without esophagitis    Mixed stress and urge urinary incontinence    CHF (congestive heart failure) ([facility])    Anemia    Undifferentiated connective tissue disease ([facility] )    Primary generalized (osteo)arthritis    Peripheral neuropathy    Senile osteoporosis    Hx of compression fracture of spine    Lumbar spondylosis    Cervical spondylosis    Closed fracture of multiple ribs of right side    Traumatic pneumothorax    Closed fracture of transverse process of lumbar vertebra (HCC)    Liver contusion    Fall    Acute pain due to trauma    Right shoulder pain    Severe protein-calorie malnutrition ([facility] )    Bipolar depression ([facility] )    Rheumatoid arthritis ([facility] )    'light-for-dates' infant with signs of fetal malnutrition    LAILA (acute kidney injury) ([facility] )    Polypharmacy    Non-ischemic cardiomyopathy ([facility])    Vitamin D insufficiency    Stroke ([facility] )    HTN (hypertension)    HFrEF (heart failure with reduced ejection fraction) ([facility] )       Past Medical History  Past Medical History:   Diagnosis Date    Anxiety     Asthma     Bipolar depression ([facility] )     Chronic narcotic dependence ([facility])     Chronic pain     Depression     Enterovirus heart infection     SHAR (obstructive sleep apnea)     Osteoarthritis     Peripheral neuropathy     Plantar fasciitis of right foot     Senile osteoporosis     Seronegative arthropathy of multiple sites ([facility] )     Undifferentiated connective tissue disease ([facility] )        Past Surgical History  Past Surgical History:   Procedure Laterality Date    BREAST IMPLANT      FIXATION KYPHOPLASTY LUMBAR SPINE HYSTERECTOMY      INCONTINENCE SURGERY N/A     NASAL SEPTOPLASTY W/ TURBINOPLASTY N/A     RECTAL PROLAPSE REPAIR N/A     REPAIR RECTOCELE      RHINOPLASTY N/A         02/13/23 1405   PT Last Visit   PT Visit Date 02/13/23   Note Type   Note type Evaluation   Pain Assessment   Pain Assessment Tool FLACC   Pain Location/Orientation Orientation: Left   Hospital Pain Intervention(s) Ambulation/increased activity   Pain Rating: FLACC (Rest) - Face 0   Pain Rating: FLACC (Rest) - Legs 0   Pain Rating: FLACC (Rest) - Activity 0   Pain Rating: FLACC (Rest) - Cry 0   Pain Rating: FLACC (Rest) - Consolability 0   Score: FLACC (Rest) 0   Pain Rating: FLACC (Activity) - Face 0   Pain Rating: FLACC (Activity) - Legs 0   Pain Rating: FLACC (Activity) - Activity 0   Pain Rating: FLACC (Activity) - Cry 0   Pain Rating: FLACC (Activity) - Consolability 0   Score: FLACC (Activity) 0   Restrictions/Precautions   Weight Bearing Precautions Per Order No   Other Precautions Chair Alarm; Bed Alarm; Fall Risk;Pain;Multiple lines   Home Living   Type of [address] [place] [address] Two level;Bed/bath upstairs  (0 TAB)   Bathroom Shower/Tub Walk-in shower   Bathroom Toilet Standard   Home Equipment Walker;Cane   Prior Function   Level of [place] Independent with functional mobility   Lives With Family  (brother and son)   Receives Help From Family   Falls in the last 6 months 1 to 4   Vocational Retired   General   Family/Caregiver Present No   Cognition   Orientation Level Oriented X4   Subjective   Subjective Pt willing and agreeable to PT session   RLE Assessment   RLE Assessment WFL   LLE Assessment   LLE Assessment   (grossly 3/5 with MMT)   Coordination   Movements are Fluid and Coordinated 0   Bed Mobility   Additional Comments Pt found resting in chair, left resting in chair as requested   Transfers   Sit to Stand 3  Moderate assistance   Additional items Assist x 2   Stand to Sit 3  Moderate assistance   Additional items Assist x 2;Increased time required   Ambulation/Elevation   Gait pattern Shuffling;Decreased foot clearance; Excessively slow; Short stride   Gait Assistance 3  Moderate assist   Additional items Assist x 2   Assistive Device Rolling walker   Distance 6'   Balance   Static Sitting Fair   Dynamic Sitting Fair -   Static Standing Poor +   Dynamic Standing Poor   Ambulatory Poor -   Endurance Deficit   Endurance Deficit Yes   Endurance Deficit Description limited by fatigue, weakness, decreased balance, and deficits in coordination   Activity Tolerance   Activity Tolerance Patient limited by fatigue;Patient limited by pain   Medical Staff Made Aware OT for D/C planning   Nurse Made Aware yes   Assessment   Prognosis Good   Problem List Decreased strength;Decreased endurance; Impaired balance;Decreased mobility; Decreased coordination;Decreased safety awareness; Impaired judgement   Assessment Pt is very pleasant and motivated 75 y o  female seen for PT evaluation s/p admit to [facility] on 2/12/2023 w/ Stroke [facility])  PT consulted to assess pt's functional mobility and d/c needs  Order placed for PT eval and tx, w/ up w/ A order  Comorbidities affecting pt's physical performance at time of assessment include:  has a past medical history of Anxiety, Asthma, Bipolar depression ([address] ), Chronic narcotic dependence ([address] ), Chronic pain, Depression, Enterovirus heart infection, SHAR (obstructive sleep apnea), Osteoarthritis, Peripheral neuropathy, Plantar fasciitis of right foot, Senile osteoporosis, Seronegative arthropathy of multiple sites ([address] ), and Undifferentiated connective tissue disease ([facility] )  PTA, pt was ambulates community distances and elevations, lives in multi-level home and retired   Personal factors affecting pt at time of IE include: ambulating w/ assistive device, inability to ambulate household distances, decreased cognition, limited home support, positive fall history, decreased initiation and engagement, unable to perform physical activity, inability to perform IADLs and inability to perform ADLs  Please find objective findings from PT assessment regarding body systems outlined above with impairments and limitations including weakness, impaired balance, decreased endurance, impaired coordination, gait deviations, pain, decreased activity tolerance, decreased functional mobility tolerance, decreased safety awareness, impaired judgement and fall risk  Noted LLE weakness with MMT  Required instruction for hand placement with transfers with new AD use  Pt ambulated with ataxic gait requiring increased time to advance LLE despite cues to lead with weaker side  Pt with increased L lateral lean with weakness requiring frequent verbal and tactile instruction to improve upright posture  Noted to keep herself positioned to left side of the walker with decreased ability to correct with verbal and visual instruction  The following objective measures performed on IE also reveal limitations: The patient's AM-PAC Basic Mobility Inpatient Short Form Raw Score is 13, Standardized Score is 33 99  A standardized score less than 42 9 suggests the patient may benefit from discharge to post-acute rehabilitation services  Please also refer to the recommendation of the Physical Therapist for safe discharge planning  Pt's clinical presentation is currently unstable/unpredictable seen in pt's presentation of critical care monitoring  Pt to benefit from continued PT tx to address deficits as defined above and maximize level of functional independent mobility and consistency  From PT/mobility standpoint, recommendation at time of d/c would be post acute rehabilitation services pending progress in order to facilitate return to PLOF  Barriers to Discharge Decreased caregiver support; Inaccessible home environment   Goals   Patient Goals To go home   STG Expiration Date 02/25/23   Short Term Goal #1 1   Complete bed mobility and transfers I to decrease need for caregiver in home  2  Ambulate 300' I to complete household and community mobility without A  3  Improve dynamic balance to good to decrease need for UE support during ambulation  4  Be educated & demonstate 12 steps to be able to enter home without A  Plan   Treatment/Interventions OT; Spoke to case management;Spoke to nursing;Gait training;Bed mobility; Patient/family training; Endurance training;LE strengthening/ROM; Functional transfer training   PT Frequency 3-5x/wk   Recommendation   PT Discharge Recommendation Post acute rehabilitation services   AM-PAC Basic Mobility Inpatient   Turning in Flat Bed Without Bedrails 3   Lying on Back to Sitting on Edge of Flat Bed Without Bedrails 3   Moving Bed to Chair 2   Standing Up From Chair Using Arms 2   Walk in Room 2   Climb 3-5 Stairs With Railing 1   Basic Mobility Inpatient Raw Score 13   Basic Mobility Standardized Score 33 99   Highest Level Of Mobility   JH-HLM Goal 4: Move to chair/commode   JH-HLM Achieved 4: Move to chair/commode           Loki Matos, PT

## 2023-02-13 NOTE — PROGRESS NOTES
Daily Progress Note - Critical Care   Moira Huerta 75 y o  female MRN: [id number]  Unit/Bed#: ICU 06 Encounter: [id number]        ----------------------------------------------------------------------------------------  HPI/24hr events: reporting some pain in her shoulder, mild headache, and heartburn/dyspepsia  Otherwise no new issues/symptoms  ---------------------------------------------------------------------------------------  SUBJECTIVE  Moira Huerta is a 75 y o  female with a PMHx of chronic pain disorder, bipolar, anxiety/depression, asthma, SHAR, seronegative arthropathy, and undifferentiated CTD who initially presented to [facility] due to acute onset of left sided weakness, slurred speech, and ataxia  Symptoms led to patient tripping and falling  Patient's brother initially called EMS due to concern for potential overdose  Last known normal 6PM on 2/11/2023  NIH 13 on arrival  Stroke Alert called at 2:55 AM  CT head completed and revealed area of loss of the gray-white matter differentiation involving the right parietotemporal region suspicious for acute ischemia  CTA head/neck confirmed a distal right MCA M1 segment with abrupt cut off and collateralized blood flow tihin the distal right MCA branches  Endovascular alert activated at 4:10 AM  No TNK administered as patient was out of administration window  Patient transferred to [facility] for CT perfusion scan and possible thrombectomy  Perfusion scan deferred on arrival  Patient now presents to the neuro ICU for continued monitoring following angiogram resulting in TICI 3 revascularization of right M1 occlusion  Review of Systems   Gastrointestinal:        Heartburn   Neurological: Positive for headaches       Review of systems was reviewed and negative unless stated above in HPI/24-hour events   ---------------------------------------------------------------------------------------  Assessment and Plan:    Neuro:   • Diagnosis: Acute Right MCA Syndrome 2/2 M1 Occlusion   ? Presented to [facility] 2/11 with left sided weakness, slurred speech, and ataxia  ? LKN 6 PM 2/11/2023 - NIH 13 on arrival  ? CT Stroke Alert:  "Area of loss of the gray-white matter differentiation involving the right parietotemporal region suspicious for acute ischemia   Occlusion of the distal right MCA M1 segment is also noted on a CTA head and neck performed at same time, compatible with an acute infarct   An MRI brain could be performed for further evaluation  No acute intracranial hemorrhage "  ? CTA Head/neck 2/12: "Abrupt cut off of flow at the distal right MCA M1 segment compatible with an acute infarct   Collateralized blood flow is noted within the distal right MCA branches  3 mm saccular aneurysm arising from the medial aspect of the proximal right ICA supraclinoid segment "  ? Outside TNK window; endovascular alert at 4:10 AM; transferred to [facility]  ? S/P angiogram with thrombectomy - TICI 3 revascularization   ? Plan:  - Continue Lipitor 40 mg daily   - 24 hour CTH done, awaiting final read  Hold ASA for now; okay for DVT ppx   - MRI brain read pending, 24 hour CT head completed  - Echo complete  - q1 hour neuro checks, STAT CT if change  - SBP goal of 120-160 mmHg; PRN ordered  - HbA1c and lipid panel complete  - Neurology and neurosurgery consulted   - PT/OT/Speech Therapy consulted   • PMR consultation placed  • Diagnosis: Chronic Pain Disorder  ? History of rib and compression fractures  ? CT CAP 2/11 with stable chronic R post 10-12 rib fractures  Stable chronic compression fractures at T11, L1, and L4  No new fractures  ? Home medication regimen as follows - continue inpatient:   - Tylenol 975 mg q8 hours  - continue  - Morphine 15 mg BID - continue to hold  - Oxycodone 2 5/5 mg q6 hours PRN for moderate/severe pain - continue  - Gabapentin 300 mg BID - continue   • Diagnosis: Bipolar Disorder with Anxiety and Depression   ?  Home medication regimen is as follows - continue inpatient:  - Wellbutrin 100 mg BID - continue  - Duloxetine 60 mg BID - continue  - Lamictal 150 mg qHS - continue  - Ativan 1 mg qHS PRN and 1 mg daily PRN - consider adding prn ativan      CV:   • Diagnosis: Hypertension  ? SBP goal of 120-160 mmHg; MAP goal >65   ? Hold home antihypertensives  ? PRN hydralazine 10 mg q6 hours for BP >160  • Diagnosis: Nonischemic cardiomyopathy, HFrEF   ? Follows with [facility]; 2/2022 TTE with LVEF 63%, G1DD  ? Echo 2/13/23 - EF 25-30%  Consider cardiology consult  ? Home regimen: Carvedilol 25 mg BID - continue holding  ? Repeat echo ordered     Pulm:  • Diagnosis: Acute Respiratory Insufficiency  ? CT CAP 2/11 with bibasilar atelectasis not no infiltrate or consolidation   ? Saturating appropriately on 6L, wean as able for an SpO2 >92%  ? Encourage use of incentive spirometer  ? Albuterol PRN for wheezing      GI:   • Diagnosis: GERD  ? Home regimen: Protonix 40 mg PO daily - continue   ? Add pepcid prn for heartburn  • Bowel Regimen: Miralax 17 g PRN and Senokot BID  • Diet: dysphagia 1, thin liquids      :   • No active issues  • Monitor I/Os; Trend BUN/creatinine     F/E/N:   • F: NS @ 75  • E: Monitor and replete for Mg>2, phos <3, K>4  • N: dysphagia 1      Heme/Onc:   • No active issue  • DVT ppx: SCDs; lovenox started    Endo:   • No active issues  • Blood glucose goal of 140-180 while inpatient       ID:   • No active issues  • Monitor WBC and trend fever curve       MSK/Skin:   • Diagnosis: Seronegative Arthropathy in Multiple Joints  ? Plaquenil 200 mg Monday - Friday, 400 mg Saturday and Sunday   ?  Continue home regimen while inpatient  • PT/OT consulted  • Skin surveillance and frequent skin checks      Patient appropriate for transfer out of the ICU today?: No  Disposition: Continue Critical Care   Code Status: Level 1 - Full Code  ---------------------------------------------------------------------------------------  ICU CORE MEASURES    Prophylaxis VTE Pharmacologic Prophylaxis: Enoxaparin (Lovenox)  VTE Mechanical Prophylaxis: sequential compression device  Stress Ulcer Prophylaxis: Pantoprazole PO    ABCDE Protocol (if indicated)  Plan to perform spontaneous awakening trial today? Not applicable  Plan to perform spontaneous breathing trial today? Not applicable  Obvious barriers to extubation? Not applicable  CAM-ICU: neg    Invasive Devices Review  Invasive Devices     Peripheral Intravenous Line  Duration           Peripheral IV 23 Left Forearm <1 day    Peripheral IV 23 Left Antecubital <1 day          Drain  Duration           External Urinary Catheter <1 day              Can any invasive devices be discontinued today? No  ---------------------------------------------------------------------------------------  OBJECTIVE    Vitals   Vitals:    23 0500 23 0524 23 0600 23 0700   BP: 119/76  140/79 139/79   Pulse: 98  94 104   Resp: 19 12 14 12   Temp:  97 8 °F (36 6 °C)     TempSrc:  Oral     SpO2: 98%  99% 97%   Weight:       Height:         Temp (24hrs), Av 8 °F (36 6 °C), Min:97 7 °F (36 5 °C), Max:98 °F (36 7 °C)  Current: Temperature: 97 8 °F (36 6 °C)  HR: 94-10  BP: 119/143  RR: 12-30  SpO2:     Respiratory:  SpO2: SpO2: 97 %  Nasal Cannula O2 Flow Rate (L/min): 2 L/min    Invasive/non-invasive ventilation settings   Respiratory    Lab Data (Last 4 hours)    None         O2/Vent Data (Last 4 hours)    None                Physical Exam  Constitutional:       General: She is not in acute distress  Appearance: She is not ill-appearing  HENT:      Head: Normocephalic and atraumatic  Nose: Nose normal  No congestion or rhinorrhea  Mouth/Throat:      Mouth: Mucous membranes are dry  Pharynx: No posterior oropharyngeal erythema  Eyes:      General:         Right eye: No discharge  Left eye: No discharge  Extraocular Movements: Extraocular movements intact        Pupils: Pupils are equal, round, and reactive to light  Cardiovascular:      Rate and Rhythm: Normal rate and regular rhythm  Pulses: Normal pulses  Heart sounds: No murmur heard  Pulmonary:      Effort: Pulmonary effort is normal       Breath sounds: No wheezing or rhonchi  Comments: Fair aeration b/l  Abdominal:      General: Abdomen is flat  Palpations: Abdomen is soft  Tenderness: There is no abdominal tenderness  Genitourinary:     Comments: purewick in place  Access site c/d/i, dressing removed  Musculoskeletal:      Cervical back: Normal range of motion  No rigidity  Right lower leg: No edema  Left lower leg: No edema  Skin:     General: Skin is warm and dry  Capillary Refill: Capillary refill takes less than 2 seconds  Findings: No lesion or rash  Neurological:      Mental Status: She is alert  Comments: RUE: 5/5 , sensation intact  LUE: 4/5 , no reported numbness  Can raise both hands above head easily, slight drift in L arm  RLE: wiggles toes, can lift leg off bed  Sensation intact  LLE: wiggles toes, can lift leg off bed   Sensation intact  GCS 14               Laboratory and Diagnostics:  Results from last 7 days   Lab Units 02/13/23  0512 02/12/23  0302   WBC Thousand/uL 7 87 6 86   HEMOGLOBIN g/dL 13 5 14 5   HEMATOCRIT % 41 5 44 5   PLATELETS Thousands/uL 197 216   NEUTROS PCT % 80*  --    MONOS PCT % 8  --      Results from last 7 days   Lab Units 02/13/23  0512 02/12/23  0302   SODIUM mmol/L 143 141   POTASSIUM mmol/L 3 8 3 9   CHLORIDE mmol/L 117* 109*   CO2 mmol/L 21 28   ANION GAP mmol/L 5 4   BUN mg/dL 11 13   CREATININE mg/dL 0 75 0 94   CALCIUM mg/dL 9 6 10 8*   GLUCOSE RANDOM mg/dL 95 98     Results from last 7 days   Lab Units 02/13/23  0512   MAGNESIUM mg/dL 1 7   PHOSPHORUS mg/dL 3 2      Results from last 7 days   Lab Units 02/12/23  0302   INR  0 90   PTT seconds 24              ABG:    VBG:          Micro        EKG: No EKGs over last 24 hours  Imaging: [facility] I have personally reviewed pertinent reports  Intake and Output  I/O       02/11 0701 02/12 0700 02/12 0701 02/13 0700 02/13 0701 02/14 0700    P  O   200     I V  (mL/kg)  1989 2 (30 4)     Total Intake(mL/kg)  2189 2 (33 5)     Urine (mL/kg/hr)  500 (0 3)     Total Output  500     Net  +1689 2            Unmeasured Urine Occurrence  1 x         UOP: 0 3 ml/kg/hr     Height and Weights   Height: 5' 3" (160 cm)  IBW (Ideal Body Weight): 52 4 kg  Body mass index is 25 54 kg/m²  Weight (last 2 days)     Date/Time Weight    02/12/23 1200 65 4 (144 18)    02/12/23 0700 65 4 (144 18)            Nutrition       Diet Orders   (From admission, onward)             Start     Ordered    02/12/23 1444  Diet Dysphagia/Modified Consistency; Dysphagia 1-Pureed; Thin Liquid  Diet effective now        References:    Nutrtion Support Algorithm Enteral vs  Parenteral   Question Answer Comment   Diet Type Dysphagia/Modified Consistency    Dysphagia/Modified Consistency Dysphagia 1-Pureed    Liquid Modifier Thin Liquid    RD to adjust diet per protocol?  Yes        02/12/23 1443                  Active Medications  Scheduled Meds:  Current Facility-Administered Medications   Medication Dose Route Frequency Provider Last Rate   • acetaminophen  975 mg Oral Q6H PRN Veda Melton, DO     • atorvastatin  40 mg Oral QPM Veda Angp, DO     • buPROPion  100 mg Oral BID Veda Angp, DO     • chlorhexidine  15 mL Mouth/Throat Q12H [facility] Melton, DO     • DULoxetine  60 mg Oral BID Veda Angp, DO     • enoxaparin  40 mg Subcutaneous Q24H [facility] Savp, DO     • gabapentin  300 mg Oral BID Veda Angp, DO     • hydrALAZINE  10 mg Intravenous Q6H PRN Veda Angp, DO     • hydroxychloroquine  200 mg Oral Once per day on Mon Tue Wed Thu Fri Veda Angp, DO     • [START ON 2/18/2023] hydroxychloroquine  400 mg Oral Once per day on Sun Sat Veda Angp, DO     • lamoTRIgine  150 mg Oral HS Veda Melton, DO     • melatonin  6 mg Oral HS Marylu R OWEN Pinon     • oxyCODONE  2 5 mg Oral Q6H PRN Veda Melton, DO      Or   • oxyCODONE  5 mg Oral Q6H PRN Veda Melton, DO     • sodium chloride  75 mL/hr Intravenous Continuous Pablo Pinon PA-C 75 mL/hr (02/13/23 0048)     Continuous Infusions:  sodium chloride, 75 mL/hr, Last Rate: 75 mL/hr (02/13/23 0048)      PRN Meds:   acetaminophen, 975 mg, Q6H PRN  hydrALAZINE, 10 mg, Q6H PRN  oxyCODONE, 2 5 mg, Q6H PRN   Or  oxyCODONE, 5 mg, Q6H PRN        Allergies   Allergies   Allergen Reactions   • Dye [Iodinated Contrast Media] Anaphylaxis     IVP dye   • Bactrim [Sulfamethoxazole-Trimethoprim] Hives   • Erythromycin Hives   • Penicillins      ---------------------------------------------------------------------------------------  Advance Directive and Living Will:      Power of :    POLST:    ---------------------------------------------------------------------------------------    Yuri Medrano MD      Portions of the record may have been created with voice recognition software  Occasional wrong word or "sound a like" substitutions may have occurred due to the inherent limitations of voice recognition software    Read the chart carefully and recognize, using context, where substitutions have occurred

## 2023-02-13 NOTE — ASSESSMENT & PLAN NOTE
2D echo revealed EF 25 to 30%  Continue carvedilol  Cardiology plans for ischemic work-up noted  Cardiology following

## 2023-02-13 NOTE — PROGRESS NOTES
Progress Note - Neurology   Sabina Garay 75 y o  female [id number]  Unit/Bed#: ICU 06/ICU 06    Assessment/Plan:    * Stroke [facility])  Assessment & Plan  75 y o  female with CHF, SHAR, nonischemic cardiomyopathy, bipolar disorder, chronic pain disorder, and seronegative arthropathy of multiple sites on Plaquenil who initially presented to [facility] on 2/12/2023 as a stroke alert for right gaze preference, left-sided sensory deficits/weakness (face/arm/leg), dysarthria, and mild aphasia  NIHSS 13   CT head concerning for acute infarct in the right parietotemporal region  CTA showing R M1 occlusion s/p mechanical thrombectomy TICI 3 revascularization  Patient was not a TNK candidate due to being outside the time window  · MRI brain revealed acute/subacute infarct in the right MCA territory with superimposed petechial hemorrhage and punctate acute/subactue infarct in the right cerebellum  · Echo: EF 25-30% with global hypokinesis, moderately dilated left atrium, normal right atrium  · EF significantly reduced from Echo performed on 4/23/2022 (EF at that time was 65%)  · Repeat CT head 2/13 with evolving right MCA territory strokes with hypodensity, likely representing a combination of resolving retained contrast and superimposed petechial hemorrhage  · LDL 84, Cholesterol 181, A1c 5 0    Etiology for M1 occlusion and infarcts in different vascular territories likely cardioembolic (possibly due to low EF of 25-30% vs undiagnosed paroxysmal A-fib given dilated left atrium)  Patient does report recent office visit with her cardiologist where she was informed that she had tachycardia, denying Afib  Plan:  - Recommend repeat CT head tomorrow (2/13)  - Antiplatelet therapy recommendations per neurosurgery team   - Patient will eventually require anticoagulation (ideally Eliquis 5 mg BID) likely 10 days post stroke    Will need repeat CT head prior to starting [facility]  - Recommend cardiology consult given EF 25-30%  - Continue atorvastatin 40 mg daily  - SBP <160   - Goal euglycemia, normothermia  - Telemetry  - Frequent neurochecks  - PT/OT/speech  - Stroke education  - STAT CT head with any acute change in neurologic exam  - Medical management supportive care per primary team, notify with changes    HTN (hypertension)  Assessment & Plan  - BP on presentation to [facility] 133/75  - SBP goal <160  - Management per ICU team    Bipolar depression ([facility] )  Assessment & Plan  - On Wellbutrin, Cymbalta, and Lamictal at baseline  - Management per primary team    Seronegative arthropathy of multiple sites [facility])  Assessment & Plan  - On Plaquenil     Chronic pain syndrome  Assessment & Plan  - History of rib and compression fractures  - On oxycodone, morphine, gabapentin, and Tylenol at home  - Pain management per primary team      Naman Tamayo will need follow up in in 6 weeks with neurovascular attending or AP  She will not require outpatient neurological testing  Subjective:   Patient seen and examined at bedside  Patient states that she is feeling better today  Each day, the weakness seems to improve  She had a slight headache this morning, but it has not resolved  She has chronic back pain  Denies any worsening weakness  Denies any numbness, tingling, chest pain, shortness of breath, or abdominal pain  She continues to have slight speech disturbance (slurred speech as well as occasional word finding difficulty)          Past Medical History:   Diagnosis Date   • Anxiety    • Asthma    • Bipolar depression ([facility] )    • Chronic narcotic dependence ([place] [id number] )    • Chronic pain    • Depression    • Enterovirus heart infection    • SHAR (obstructive sleep apnea)    • Osteoarthritis    • Peripheral neuropathy    • Plantar fasciitis of right foot    • Senile osteoporosis    • Seronegative arthropathy of multiple sites [facility])    • Undifferentiated connective tissue disease ([facility] )      Past Surgical History:   Procedure Laterality Date   • BREAST IMPLANT     • FIXATION KYPHOPLASTY LUMBAR SPINE     • HYSTERECTOMY     • INCONTINENCE SURGERY N/A    • NASAL SEPTOPLASTY W/ TURBINOPLASTY N/A    • RECTAL PROLAPSE REPAIR N/A    • REPAIR RECTOCELE     • RHINOPLASTY N/A      Family History   Problem Relation Age of Onset   • No Known Problems Mother    • No Known Problems Father    • Arthritis Family    • Diabetes Family    • Stroke Family      Social History     Socioeconomic History   • Marital status:      Spouse name: Not on file   • Number of children: Not on file   • Years of education: Not on file   • Highest education level: Not on file   Occupational History   • Not on file   Tobacco Use   • Smoking status: Former   • Smokeless tobacco: Never   Vaping Use   • Vaping Use: Never used   Substance and Sexual Activity   • Alcohol use: Not Currently     Comment: quit   • Drug use: Yes     Types: Marijuana     Comment: has medical card   • Sexual activity: Not on file   Other Topics Concern   • Not on file   Social History Narrative   • Not on file     Social Determinants of Health     Financial Resource Strain: Not on file   Food Insecurity: No Food Insecurity   • Worried About Running Out of Food in the Last Year: Never true   • Ran Out of Food in the Last Year: Never true   Transportation Needs: No Transportation Needs   • Lack of Transportation (Medical): No   • Lack of Transportation (Non-Medical): No   Physical Activity: Not on file   Stress: Not on file   Social Connections: Not on file   Intimate Partner Violence: Not on file   Housing Stability: Unknown   • Unable to Pay for Housing in the Last Year: Not on file   • Number of Places Lived in the Last Year: 1   • Unstable Housing in the Last Year: No     E-Cigarette/Vaping   • E-Cigarette Use Never User      E-Cigarette/Vaping Substances   • Nicotine No    • THC No    • CBD No    • Flavoring No    • Other No    • Unknown No          Medications:   All current active meds have been reviewed and current meds:  Scheduled Meds:  Current Facility-Administered Medications   Medication Dose Route Frequency Provider Last Rate   • acetaminophen  975 mg Oral Q6H PRN Veda Shupp, DO     • atorvastatin  40 mg Oral QPM Veda Shupp, DO     • buPROPion  100 mg Oral BID Veda Shupp, DO     • chlorhexidine  15 mL Mouth/Throat Q12H [address] Veda Shupp, DO     • DULoxetine  60 mg Oral BID Veda Shupp, DO     • enoxaparin  40 mg Subcutaneous Q24H [address] Veda Shupp, DO     • gabapentin  300 mg Oral BID Veda Shupp, DO     • hydrALAZINE  10 mg Intravenous Q6H PRN Veda Shupp, DO     • hydroxychloroquine  200 mg Oral Once per day on Mon Tue Wed Thu Fri Veda Shupp, DO     • [START ON 2/18/2023] hydroxychloroquine  400 mg Oral Once per day on Sun Sat Veda Shupp, DO     • lamoTRIgine  150 mg Oral HS Veda Shupp, DO     • melatonin  6 mg Oral HS Marylu Pinon PA-C     • oxyCODONE  2 5 mg Oral Q6H PRN Veda upp, DO      Or   • oxyCODONE  5 mg Oral Q6H PRN Veda upp, DO     • sodium chloride  75 mL/hr Intravenous Continuous Ita Pinon PA-C 75 mL/hr (02/13/23 0048)     Continuous Infusions:sodium chloride, 75 mL/hr, Last Rate: 75 mL/hr (02/13/23 0048)      PRN Meds: •  acetaminophen  •  hydrALAZINE  •  oxyCODONE **OR** oxyCODONE       ROS:   Review of Systems   Musculoskeletal: Positive for back pain  Neurological: Positive for speech difficulty and weakness  All other systems reviewed and are negative  Vitals:   /79   Pulse 104   Temp 97 8 °F (36 6 °C) (Oral)   Resp 12   Ht 5' 3" (1 6 m)   Wt 65 4 kg (144 lb 2 9 oz)   SpO2 97%   BMI 25 54 kg/m²     Physical Exam:   Physical Exam  Vitals and nursing note reviewed  Constitutional:       Appearance: Normal appearance  Comments: Pleasant female sitting comfortably in bedside chair no acute distress   HENT:      Head: Normocephalic and atraumatic        Mouth/Throat:      Mouth: Mucous membranes are moist       Pharynx: Oropharynx is clear  Eyes:      Extraocular Movements: Extraocular movements intact  Conjunctiva/sclera: Conjunctivae normal       Pupils: Pupils are equal, round, and reactive to light  Pulmonary:      Effort: Pulmonary effort is normal    Musculoskeletal:      Comments: Decreased strength left UE/LE   Skin:     General: Skin is warm and dry  Neurological:      Mental Status: She is alert and oriented to person, place, and time  Comments: See full neuro exam below       Neurologic Exam     Mental Status   Oriented to person, place, and time  Patient awake and alert  Oriented to person, place, city, month, year, date, and current president  Mild dysarthria, but speech is mostly intelligible  During normal conversation, patient had difficulty saying a few words, but otherwise no aphasia  Able to follow simple midline and appendicular commands  Cranial Nerves     CN III, IV, VI   Pupils are equal, round, and reactive to light  Pupils 4 mm, round, reactive to light bilaterally  EOMs intact without nystagmus  No visual field deficits, however does have mild visual neglect on the left side  Facial sensation to light touch intact throughout  Flattening of left nasolabial fold, but symmetric smile  Tongue midline  Full strength of sternocleidomastoid and trapezius muscles   Hearing grossly intact  Motor Exam   Muscle bulk: normal  Pronator drift in left upper extremity  4-/5 left deltoid  4+/5 left biceps and triceps  4/5 left  strength  4-/5 left iliopsoas muscle  4+/5 left knee flexion/extension  5/5 left dorsiflexion and plantarflexion  5/5 strength in right UE/LE     Sensory Exam   Sensation to light touch and temperature intact throughout       Gait, Coordination, and Reflexes     Gait  Gait: (Deferred for patient's safety)  No ataxia with finger to nose bilaterally (slight dysmetria with left finger-to-nose, but due to weakness)  No resting or action tremor  No ankle clonus bilaterally           Labs: I have personally reviewed pertinent reports     Recent Results (from the past 24 hour(s))   Echo complete w/ contrast if indicated    Collection Time: 02/12/23 12:15 PM   Result Value Ref Range    AV peak gradient 65 mmHg    LA size 3 5 cm    LVPWd 1 00 cm    Left Atrium Area-systolic Apical Two Chamber 20 3 cm2    Left Atrium Area-systolic Four Chamber 15 1 cm2    MV E' Tissue Velocity Septal 6 cm/s    Tricuspid annular plane systolic excursion 5 99 cm    IVSd 3 93 cm    LV DIASTOLIC VOLUME (MOD BIPLANE) 2D 128 mL    LEFT VENTRICLE SYSTOLIC VOLUME (MOD BIPLANE) 2D 85 mL    Left ventricular stroke volume (2D) 43 00 mL    AV Deceleration Time 1,183 ms    EF 29 %    A4C EF 28 %    LA length (A2C) 5 30 cm    LVIDd 5 20 cm    IVS 1 1 cm    LVIDS 4 30 cm    FS 17 28 - 44 %    Ao root 3 60 cm    RVID d 3 0 cm    AV regurgitation pressure 1/2 time 343 ms    MV valve area p 1/2 method 10 48 cm2    E wave deceleration time 72 ms    MV Peak E Julian 96 cm/s    LV Systolic Volume (BP) 85 mL    LV Diastolic Volume (BP) 094 mL    RAA A4C 9 9 cm2    MV stenosis pressure 1/2 time 21 ms    LVSV, 2D 43 mL    LV EF 25     Est  RA pres 3 0 mmHg   CBC and differential    Collection Time: 02/13/23  5:12 AM   Result Value Ref Range    WBC 7 87 4 31 - 10 16 Thousand/uL    RBC 4 38 3 81 - 5 12 Million/uL    Hemoglobin 13 5 11 5 - 15 4 g/dL    Hematocrit 41 5 34 8 - 46 1 %    MCV 95 82 - 98 fL    MCH 30 8 26 8 - 34 3 pg    MCHC 32 5 31 4 - 37 4 g/dL    RDW 14 2 11 6 - 15 1 %    MPV 10 3 8 9 - 12 7 fL    Platelets 817 332 - 632 Thousands/uL    nRBC 0 /100 WBCs    Neutrophils Relative 80 (H) 43 - 75 %    Immat GRANS % 0 0 - 2 %    Lymphocytes Relative 12 (L) 14 - 44 %    Monocytes Relative 8 4 - 12 %    Eosinophils Relative 0 0 - 6 %    Basophils Relative 0 0 - 1 %    Neutrophils Absolute 6 23 1 85 - 7 62 Thousands/µL    Immature Grans Absolute 0 03 0 00 - 0 20 Thousand/uL Lymphocytes Absolute 0 94 0 60 - 4 47 Thousands/µL    Monocytes Absolute 0 65 0 17 - 1 22 Thousand/µL    Eosinophils Absolute 0 00 0 00 - 0 61 Thousand/µL    Basophils Absolute 0 02 0 00 - 0 10 Thousands/µL   Basic metabolic panel    Collection Time: 02/13/23  5:12 AM   Result Value Ref Range    Sodium 143 135 - 147 mmol/L    Potassium 3 8 3 5 - 5 3 mmol/L    Chloride 117 (H) 96 - 108 mmol/L    CO2 21 21 - 32 mmol/L    ANION GAP 5 4 - 13 mmol/L    BUN 11 5 - 25 mg/dL    Creatinine 0 75 0 60 - 1 30 mg/dL    Glucose 95 65 - 140 mg/dL    Calcium 9 6 8 3 - 10 1 mg/dL    eGFR 82 ml/min/1 73sq m   Magnesium    Collection Time: 02/13/23  5:12 AM   Result Value Ref Range    Magnesium 1 7 1 6 - 2 6 mg/dL   Phosphorus    Collection Time: 02/13/23  5:12 AM   Result Value Ref Range    Phosphorus 3 2 2 3 - 4 1 mg/dL   Lipid Panel with Direct LDL reflex    Collection Time: 02/13/23  5:12 AM   Result Value Ref Range    Cholesterol 181 See Comment mg/dL    Triglycerides 81 See Comment mg/dL    HDL, Direct 81 >=50 mg/dL    LDL Calculated 84 0 - 100 mg/dL   Hemoglobin A1c w/EAG Estimation    Collection Time: 02/13/23  5:12 AM   Result Value Ref Range    Hemoglobin A1C 5 0 Normal 3 8-5 6%; PreDiabetic 5 7-6 4%; Diabetic >=6 5%; Glycemic control for adults with diabetes <7 0% %    EAG 97 mg/dl       Imaging: I have personally reviewed pertinent imaging in PACS, including CT head, CTA head/neck, MRI brain, echo, repeat CT head,  and I have personally reviewed PACS reports  EKG, Pathology, and Other Studies: I have personally reviewed pertinent reports  VTE Prophylaxis: Sequential compression device (Venodyne)  and Enoxaparin (Lovenox)      Counseling / Coordination of Care  Total time spent today 32 minutes  Greater than 50% of total time was spent with the patient and/or family counseling and/or coordination of care  A description of the counseling/coordination of care:  Patient was seen and evaluated    Discussed with attending  Chart reviewed thoroughly including laboratory and imaging studies    Plan of care discussed with patient and primary team   Reviewed MRI imaging with the patient and discussed echocardiogram

## 2023-02-13 NOTE — SPEECH THERAPY NOTE
Speech Language/Pathology    Speech/Language Pathology Progress Note    Patient Name: Dale Hernandez  [id number] Date: 2/13/2023     Problem List  Principal Problem:    Stroke [facility])  Active Problems:    Chronic pain syndrome    Seronegative arthropathy of multiple sites ([facility] )    Bipolar depression ([place] [address] )    HTN (hypertension)       Past Medical History  Past Medical History:   Diagnosis Date   • Anxiety    • Asthma    • Bipolar depression ([facility] )    • Chronic narcotic dependence ([place] [address] )    • Chronic pain    • Depression    • Enterovirus heart infection    • SHAR (obstructive sleep apnea)    • Osteoarthritis    • Peripheral neuropathy    • Plantar fasciitis of right foot    • Senile osteoporosis    • Seronegative arthropathy of multiple sites ([place] [address] )    • Undifferentiated connective tissue disease ([place] [address] )         Past Surgical History  Past Surgical History:   Procedure Laterality Date   • BREAST IMPLANT     • FIXATION KYPHOPLASTY LUMBAR SPINE     • HYSTERECTOMY     • INCONTINENCE SURGERY N/A    • NASAL SEPTOPLASTY W/ TURBINOPLASTY N/A    • RECTAL PROLAPSE REPAIR N/A    • REPAIR RECTOCELE     • RHINOPLASTY N/A          Subjective:  "I don't want puree meat" Patient is awake and alert  OOB in chair  Objective: The patient is seen for dysphagia therapy  She is agreeable to am snack  The patient continues with left sided facial weakness and appears to have lingual groping  The patient requests ice cream and is also assessed with regular toast  Bite strength is adequate  Mastication time is prolonged and incoordinated  Patient is able to transfer and swallow first bite, but spits out second bite  She reports feeling poor oral control with "gagging"  Patient tolerated ice cream well, but has occasional anterior leakage  Patient is impulsive when eating, which she reports is not baseline  She takes small, single sips of thin liquids via straw  Cough x1 observed   RN reports some coughing with am meal      Assessment:  Patient has decreased oral control with regular solids  Appears to be tolerating puree solids and thin liquids well  Plan/Recommendations:  Recommend to continue puree diet with thin liquids  ST will continue to further assess tolerance and trial upgrades as able

## 2023-02-13 NOTE — CONSULTS
Reason for Consult / Principal Problem:    Physician Requesting Consult:  Kranthi Garcia DO    Cardiologist: [facility]        Assessment and Plan      Current Problem List   Principal Problem:    Stroke [facility])  Active Problems:    Chronic pain syndrome    Seronegative arthropathy of multiple sites ([facility] [address] )    Bipolar depression ([facility] )    HTN (hypertension)    Assessment/Plan:    1  Cardiomyopathy - Patient with previous history of NICM per notes - although we have no records of actual even but rather just his caridologist at [facility] notes  Reported history of EF 10-15% in early 2000s was placed on GDMT with improvement of EF  Had a EF of 50% in 2019 then improved with restarting coreg to 60% now with reduction in EF to 25-30% in the setting of acute stroke  Etiologies include ICM vs  Stress induced CM as most likely possible etiologies - although no definite exclusion of other etiologies at this time  · Restart Coreg when able to from neurology standpoint  · Will need eventual cardiac catheterization for ischemic work up  · I and O  · Daily weights  · Will hold off on further workup for other non ischemic etiologies pending ischemic work up and EF rebdound on GDMT  · Monitor on tele  · Does not appear to be low output on exam - tachycardia likely secondary to stroke  2   Valvular abnormalities - noted to have mild to moderate AI mild MR  ·  Cont  To follow up outpatient     3  Seronegative arthropathy   ·  On plaquenil,       Subjective     CC: CM      HPI: This is a 59-year-old female with a past medical history significant for bipolar depression, hypertension, rheumatoid arthritis with seronegative arthropathy, undifferentiated CTD, history of nonischemic cardiomyopathy most recent LV EF 50% based on echocardiogram done July 2019 as well as echo performed April 2022 with EF of 63%,, history previously of diagnosis of nonischemic cardiomyopathy supposedly in 2004 when she initial presentation of ejection fraction of 10 to 15% however we do not have the records of this  Patient it appears that time diagnosed with a EF of 10 to 15% and had normalization of EF on appropriate medical therapy  Previous cardiology work-up includes echocardiogram with EF 60%, myocardial perfusion scan with no result available for review  Patient presented this admission yesterday from home due to patient being weak and able to walk  Patient arrived with slurred speech and able to move left side  On presentation to the emergency department patient had a CT head performed which was notable for evolving right MCA territory infarcts and associated hyperdense densitypatient had a, MRI brain performed which was notable for acute to subacute right MCA territory infarct  With petechial hemorrhage  Patient also was noted to have a punctate focus in the right cerebellum  Patient had a echocardiogram performed yesterday with EF of 20 to 25%  Global hypokinesis  Mildly dilated left atrium, mild to moderate AI and mitral valve with mild regurgitation  EKG performed was notable for QS in anterior precordial leads, nonspecific ST changes  Sinus tachycardia  The patient did have TICI 3 revascularization of right M1 occlusion  The patient currently is on atorvastatin, hydralazine  Home medications include carvedilol 1 25 twice daily  The patient has no recent ischemic evaluation  Patient did have a drug screen positive for [facility] 2021 and opiates although she is on chronic opiate medication outpatient  The patient does admit to recent episodes of chest pain that is at her normal rate of walking  She states it is worse with exertion  She does admit to some shortness of breath as well  She is tachycardic on tele in sinus tach           Family History:   Family History   Problem Relation Age of Onset   • No Known Problems Mother    • No Known Problems Father    • Arthritis Family    • Diabetes Family    • Stroke Family      Historical Information   Past Medical History:   Diagnosis Date   • Anxiety    • Asthma    • Bipolar depression ([facility] )    • Chronic narcotic dependence ([facility] )    • Chronic pain    • Depression    • Enterovirus heart infection    • SHAR (obstructive sleep apnea)    • Osteoarthritis    • Peripheral neuropathy    • Plantar fasciitis of right foot    • Senile osteoporosis    • Seronegative arthropathy of multiple sites ([facility] )    • Undifferentiated connective tissue disease (HCC)      Past Surgical History:   Procedure Laterality Date   • BREAST IMPLANT     • FIXATION KYPHOPLASTY LUMBAR SPINE     • HYSTERECTOMY     • INCONTINENCE SURGERY N/A    • NASAL SEPTOPLASTY W/ TURBINOPLASTY N/A    • RECTAL PROLAPSE REPAIR N/A    • REPAIR RECTOCELE     • RHINOPLASTY N/A      Social History   Social History     Substance and Sexual Activity   Alcohol Use Not Currently    Comment: quit     Social History     Substance and Sexual Activity   Drug Use Yes   • Types: Marijuana    Comment: has medical card     Social History     Tobacco Use   Smoking Status Former   Smokeless Tobacco Never     Family History:   Family History   Problem Relation Age of Onset   • No Known Problems Mother    • No Known Problems Father    • Arthritis Family    • Diabetes Family    • Stroke Family        Review of Systems:  Review of Systems   Respiratory: Positive for shortness of breath  Cardiovascular: Positive for chest pain  Allergic/Immunologic:        On hydroxychlorquine  Neurological: Positive for weakness  Psychiatric/Behavioral: Negative for agitation             Scheduled Meds:  Current Facility-Administered Medications   Medication Dose Route Frequency Provider Last Rate   • acetaminophen  975 mg Oral Q6H PRN Veda Melton, DO     • atorvastatin  40 mg Oral QPM Veda Angp, DO     • buPROPion  100 mg Oral BID Veda Angp, DO     • chlorhexidine  15 mL Mouth/Throat Q12H [facility] Veda Melton, DO     • DULoxetine  60 mg Oral BID Veda Cadetupp, DO     • enoxaparin  40 mg Subcutaneous Q24H [address] Veda Cadetupp, DO     • gabapentin  300 mg Oral BID Veda Angp, DO     • hydrALAZINE  10 mg Intravenous Q6H PRN Veda Angp, DO     • hydroxychloroquine  200 mg Oral Once per day on  Veda Cadetupp, DO     • [START ON 2023] hydroxychloroquine  400 mg Oral Once per day on Sun Sat Veda Cadetupp, DO     • lamoTRIgine  150 mg Oral HS Veda Cadetupp, DO     • melatonin  6 mg Oral HS Riaz Pinon PA-C     • morphine  15 mg Oral Q12H [address] Domi Smith MD     • oxyCODONE  2 5 mg Oral Q6H PRN Veda Angp, DO      Or   • oxyCODONE  5 mg Oral Q6H PRN Veda Angp, DO       Continuous Infusions:   PRN Meds: •  acetaminophen  •  hydrALAZINE  •  oxyCODONE **OR** oxyCODONE  all current active meds have been reviewed    Allergies   Allergen Reactions   • Dye [Iodinated Contrast Media] Anaphylaxis     IVP dye   • Bactrim [Sulfamethoxazole-Trimethoprim] Hives   • Erythromycin Hives   • Penicillins        Objective   Vitals: Temp (24hrs), Av 9 °F (36 6 °C), Min:97 7 °F (36 5 °C), Max:98 1 °F (36 7 °C)  Current: Temperature: 98 1 °F (36 7 °C)  Patient Vitals for the past 24 hrs:   BP Temp Temp src Pulse Resp SpO2   23 0900 125/81 -- -- (!) 108 -- 97 %   23 0800 132/78 98 1 °F (36 7 °C) -- 102 -- 97 %   23 0700 139/79 -- -- 104 12 97 %   23 0600 140/79 -- -- 94 14 99 %   23 0524 -- 97 8 °F (36 6 °C) Oral -- 12 --   23 0500 119/76 -- -- 98 19 98 %   23 0300 132/78 -- -- 96 (!) 24 --   23 0200 135/81 -- -- 96 22 98 %   23 0100 135/81 -- -- (!) 110 22 99 %   23 0000 143/77 -- -- (!) 114 (!) 30 100 %   23 2300 134/78 -- -- (!) 112 21 99 %   23 2234 137/79 -- -- (!) 112 22 --   23 2134 132/77 97 7 °F (36 5 °C) Oral (!) 116 22 100 %   23 1936 117/80 -- -- (!) 108 (!) 23 --   23 1900 140/78 -- -- (!) 108 22 97 %   23 1845 126/74 -- -- (!) 108 19 98 %   02/12/23 1800 135/74 -- -- (!) 106 12 98 %   02/12/23 1700 -- -- -- (!) 110 13 97 %   02/12/23 1600 136/76 98 °F (36 7 °C) Oral (!) 108 22 98 %   02/12/23 1500 127/83 -- -- (!) 108 (!) 29 93 %   02/12/23 1430 135/76 -- -- 104 19 97 %    Body mass index is 25 54 kg/m²  Orthostatic Blood Pressures    Flowsheet Row Most Recent Value   Blood Pressure 125/81 filed at 02/13/2023 0900              Invasive Devices     Peripheral Intravenous Line  Duration           Peripheral IV 02/12/23 Left Forearm <1 day    Peripheral IV 02/13/23 Left Antecubital <1 day          Drain  Duration           External Urinary Catheter <1 day                Physical Exam:  Physical Exam  Cardiovascular:      Rate and Rhythm: Regular rhythm  Tachycardia present  Pulmonary:      Effort: Pulmonary effort is normal       Breath sounds: No wheezing  Abdominal:      General: There is no distension  Musculoskeletal:      Right lower leg: No edema  Left lower leg: No edema  Skin:     General: Skin is warm  Neurological:      Mental Status: She is alert     Psychiatric:         Mood and Affect: Mood normal              Lab Results:   Results from last 7 days   Lab Units 02/13/23  0512 02/12/23  0302   WBC Thousand/uL 7 87 6 86   HEMOGLOBIN g/dL 13 5 14 5   HEMATOCRIT % 41 5 44 5   PLATELETS Thousands/uL 197 216   NEUTROS PCT % 80*  --    MONOS PCT % 8  --       Results from last 7 days   Lab Units 02/13/23  0512 02/12/23  0302   SODIUM mmol/L 143 141   POTASSIUM mmol/L 3 8 3 9   CHLORIDE mmol/L 117* 109*   CO2 mmol/L 21 28   BUN mg/dL 11 13   CREATININE mg/dL 0 75 0 94   CALCIUM mg/dL 9 6 10 8*   MAGNESIUM mg/dL 1 7  --    PHOSPHORUS mg/dL 3 2  --    INR   --  0 90   PTT seconds  --  24   EGFR ml/min/1 73sq m 82 62     Results from last 7 days   Lab Units 02/12/23  0302   INR  0 90   PTT seconds 24             No results found for: PHART, IIV1HJF, PO2ART, VZK7ZDS, T4CINMBS, BEART, SOURCE  No components found for: HIV1X2  No results found for: HAV, HEPAIGM, HEPBIGM, HEPBCAB, HBEAG, HEPCAB  No results found for: SPEP, UPEP   Lab Results   Component Value Date    HGBA1C 5 0 02/13/2023    HGBA1C 5 2 09/13/2022     No results found for: CHOL   Lab Results   Component Value Date    HDL 81 02/13/2023      Lab Results   Component Value Date    LDLCALC 84 02/13/2023      Lab Results   Component Value Date    TRIG 81 02/13/2023     No components found for: PROCAL          Imaging: I have personally reviewed pertinent reports

## 2023-02-13 NOTE — CONSULTS
Consult for stroke  PT reported prior to admission appetite was good, no concerns  Currently has decreased appetite, on pureed, thin liquids, this morning didn't eat much, only had coffee  Reported no n/v/d  Will continue to monitor ST f/u for any potential upgrades  Reviewed labs: lipid profile and HbA1c wnl  BMI 25  Education not indicated at this time  Added ensure pudding vanilla for L and magic cup for D as PT reported liking puddings and ice cream to help assist with increasing pro, jacklyn intake  Will continue to monitor po intake

## 2023-02-13 NOTE — PLAN OF CARE
Problem: OCCUPATIONAL THERAPY ADULT  Goal: Performs self-care activities at highest level of function for planned discharge setting  See evaluation for individualized goals  Description: Treatment Interventions: ADL retraining, Functional transfer training, UE strengthening/ROM, Endurance training, Patient/family training, Equipment evaluation/education, Neuromuscular reeducation, Fine motor coordination activities, Compensatory technique education, Continued evaluation, Energy conservation, Activityengagement          See flowsheet documentation for full assessment, interventions and recommendations  Note: Limitation: Decreased ADL status, Decreased UE ROM, Decreased UE strength, Decreased endurance, Decreased self-care trans, Decreased high-level ADLs  Prognosis: Fair  Assessment: Pt is 75 y o  female admitted to [facility] on 2/12/2023 w/ a stroke  Pt received "Right Internal Carotid Arteriogram, Right MCA thrombectomy with Solitiare Stent Retreiver and Zoom, Right Common carotid angiogram, Limited Right Femoral Arteriogram, Ultrasound guidance for right radial access, Complex arterial access increasing length of procedure" on 2/12/2023  Pt  has a past medical history of Anxiety, Asthma, Bipolar depression ([facility] [address] ), Chronic narcotic dependence ([facility] [address] ), Chronic pain, Depression, Enterovirus heart infection, SHAR (obstructive sleep apnea), Osteoarthritis, Peripheral neuropathy, Plantar fasciitis of right foot, Senile osteoporosis, Seronegative arthropathy of multiple sites ([facility] [address] ), and Undifferentiated connective tissue disease ([facility] [address] )    Pt with active OT orders and activity orders  Pt resides in a two story Home, with 0 TAB  Pt ambulates ~ a FF of steps to 2nd floor for bed  Pt lives with her son and brother who can A if needed  Pt was receiving some A w/ ADLs, IADLs, (-) drove  Pt is currently functioning at S for eating, min A for UB ADLs, and mod A for LB ADLs  Pt requires mod Ax2 for transfers and functional mobility  Pt requires vc'ing t/o session for increase safety awareness to L side and increased awareness of L sided lean  Pt has decreased overall strength (gross and fine motor) in LUE  Pt is limited 2* pain, endurance, activity tolerance, functional mobility, balance, trunk control, functional standing tolerance, decreased I w/ ADLS/IADLS, decreased safety awareness, decreased insight into deficits and slurred speech  The Areas of Occupational Performance Areas to address w/ pt include: eating, grooming, bathing/shower, toilet hygiene, dressing, health maintenance and functional mobility  Based off of an OT evaulation, assessment, and performance functioning, pt is identified as a high complexity  The patient's raw score on the AM-PAC Daily Activity Inpatient Short Form is 16  A raw score of less than 19 suggests the patient may benefit from discharge to post-acute rehabilitation services  Please refer to the recommendation of the Occupational Therapist for safe discharge planning  OT recommendation for d/c includes post acute rehab  Pt would benefit from continued acute OT services for  3-5x/week to  w/in 10-14 days:  to address functional goals       OT Discharge Recommendation: Post acute rehabilitation services

## 2023-02-14 ENCOUNTER — APPOINTMENT (INPATIENT)
Dept: RADIOLOGY | Facility: HOSPITAL | Age: 68
End: 2023-02-14

## 2023-02-14 PROBLEM — I42.9 CARDIOMYOPATHY (HCC): Status: ACTIVE | Noted: 2023-02-07

## 2023-02-14 LAB
ATRIAL RATE: 101 BPM
ATRIAL RATE: 101 BPM
P AXIS: 43 DEGREES
P AXIS: 56 DEGREES
PR INTERVAL: 167 MS
PR INTERVAL: 175 MS
QRS AXIS: -1 DEGREES
QRS AXIS: 3 DEGREES
QRSD INTERVAL: 100 MS
QRSD INTERVAL: 100 MS
QT INTERVAL: 313 MS
QT INTERVAL: 321 MS
QTC INTERVAL: 406 MS
QTC INTERVAL: 416 MS
T WAVE AXIS: 131 DEGREES
T WAVE AXIS: 138 DEGREES
VENTRICULAR RATE: 101 BPM
VENTRICULAR RATE: 101 BPM

## 2023-02-14 RX ORDER — CARVEDILOL 6.25 MG/1
6.25 TABLET ORAL 2 TIMES DAILY WITH MEALS
Status: DISCONTINUED | OUTPATIENT
Start: 2023-02-14 | End: 2023-02-16

## 2023-02-14 RX ORDER — AMOXICILLIN 250 MG
2 CAPSULE ORAL 2 TIMES DAILY
Status: DISCONTINUED | OUTPATIENT
Start: 2023-02-14 | End: 2023-02-16 | Stop reason: HOSPADM

## 2023-02-14 RX ORDER — POLYETHYLENE GLYCOL 3350 17 G/17G
17 POWDER, FOR SOLUTION ORAL DAILY
Status: DISCONTINUED | OUTPATIENT
Start: 2023-02-14 | End: 2023-02-16 | Stop reason: HOSPADM

## 2023-02-14 RX ADMIN — DULOXETINE HYDROCHLORIDE 60 MG: 60 CAPSULE, DELAYED RELEASE ORAL at 07:41

## 2023-02-14 RX ADMIN — BUPROPION HYDROCHLORIDE 100 MG: 100 TABLET, FILM COATED ORAL at 17:36

## 2023-02-14 RX ADMIN — ALUMINA, MAGNESIA, AND SIMETHICONE ORAL SUSPENSION REGULAR STRENGTH 30 ML: 1200; 1200; 120 SUSPENSION ORAL at 15:38

## 2023-02-14 RX ADMIN — ENOXAPARIN SODIUM 40 MG: 40 INJECTION SUBCUTANEOUS at 07:42

## 2023-02-14 RX ADMIN — CARVEDILOL 6.25 MG: 6.25 TABLET, FILM COATED ORAL at 15:38

## 2023-02-14 RX ADMIN — OXYCODONE HYDROCHLORIDE 5 MG: 5 TABLET ORAL at 14:04

## 2023-02-14 RX ADMIN — OXYCODONE HYDROCHLORIDE 5 MG: 5 TABLET ORAL at 20:24

## 2023-02-14 RX ADMIN — POLYETHYLENE GLYCOL 3350 17 G: 17 POWDER, FOR SOLUTION ORAL at 15:39

## 2023-02-14 RX ADMIN — GABAPENTIN 300 MG: 300 CAPSULE ORAL at 07:42

## 2023-02-14 RX ADMIN — ATORVASTATIN CALCIUM 40 MG: 40 TABLET, FILM COATED ORAL at 17:36

## 2023-02-14 RX ADMIN — GABAPENTIN 300 MG: 300 CAPSULE ORAL at 17:37

## 2023-02-14 RX ADMIN — MORPHINE SULFATE 15 MG: 15 TABLET, FILM COATED, EXTENDED RELEASE ORAL at 07:41

## 2023-02-14 RX ADMIN — OXYCODONE HYDROCHLORIDE 5 MG: 5 TABLET ORAL at 07:42

## 2023-02-14 RX ADMIN — SENNOSIDES AND DOCUSATE SODIUM 2 TABLET: 8.6; 5 TABLET ORAL at 15:38

## 2023-02-14 RX ADMIN — BUPROPION HYDROCHLORIDE 100 MG: 100 TABLET, FILM COATED ORAL at 11:56

## 2023-02-14 RX ADMIN — MELATONIN 6 MG: at 23:03

## 2023-02-14 RX ADMIN — MORPHINE SULFATE 15 MG: 15 TABLET, FILM COATED, EXTENDED RELEASE ORAL at 23:03

## 2023-02-14 RX ADMIN — HYDROXYCHLOROQUINE SULFATE 200 MG: 200 TABLET ORAL at 11:56

## 2023-02-14 RX ADMIN — LAMOTRIGINE 150 MG: 100 TABLET ORAL at 23:03

## 2023-02-14 RX ADMIN — DULOXETINE HYDROCHLORIDE 60 MG: 60 CAPSULE, DELAYED RELEASE ORAL at 17:36

## 2023-02-14 NOTE — RESTORATIVE TECHNICIAN NOTE
Restorative Technician Note      Patient Name: Sabina Garay     Note Type: Mobility  Patient Position Upon Consult: Bedside chair  Activity Performed: Ambulated; Dangled; Stood  Assistive Device: Roller walker; Other (Comment) (Assist x2)  Education Provided: Yes  Patient Position at End of Consult: Bedside chair;  All needs within reach; Bed/Chair alarm activated    [facility] BS, Restorative Technician, [employer]

## 2023-02-14 NOTE — SPEECH THERAPY NOTE
Speech Language/Pathology    Speech/Language Pathology Progress Note    Patient Name: Moira Huerta  IYLUA'Z Date: 2/14/2023     Problem List  Principal Problem:    Stroke [facility])  Active Problems:    Chronic pain syndrome    Seronegative arthropathy of multiple sites ([place] [address] )    Bipolar depression ([place] [address] )    HTN (hypertension)    HFrEF (heart failure with reduced ejection fraction) ([place] [address] )       Past Medical History  Past Medical History:   Diagnosis Date   • Anxiety    • Asthma    • Bipolar depression ([place] [address] )    • Chronic narcotic dependence ([place] [address] )    • Chronic pain    • Depression    • Enterovirus heart infection    • SHAR (obstructive sleep apnea)    • Osteoarthritis    • Peripheral neuropathy    • Plantar fasciitis of right foot    • Senile osteoporosis    • Seronegative arthropathy of multiple sites ([place] [address] )    • Undifferentiated connective tissue disease ([place] [address] )         Past Surgical History  Past Surgical History:   Procedure Laterality Date   • BREAST IMPLANT     • FIXATION KYPHOPLASTY LUMBAR SPINE     • HYSTERECTOMY     • INCONTINENCE SURGERY N/A    • NASAL SEPTOPLASTY W/ TURBINOPLASTY N/A    • RECTAL PROLAPSE REPAIR N/A    • REPAIR RECTOCELE     • RHINOPLASTY N/A          Subjective:  "This is a little bit better" Patient OOB in chair  Awake and alert  Objective: The patient is seen for dysphagia therapy at lunch meal  She continues with left side facial weakness  Daughter is present at bedside  The patient is trialed with level 2/mechanical soft solids, including ground pork and mashed potatoes  She feeds herself  Bite size can be large and the patient is observed with intermittent left side anterior leakage  Appears to have decreased awareness and needs verbal cues to clear  Oral control and mastication of mechanical soft bolus appears adequate  No oral residue observed  No coughing or gagging episodes observed  The patient takes small, single sips of thin liquids via straw  No overt s/s aspiration observed  Assessment:  The patient tolerated mechanical soft solids well  Plan/Recommendations:  Recommend diet change to mechanical soft and thin liquids  Continue ST to further assess tolerance  Will also complete motor speech evaluation for dysarthria as able  Patient and family agree with POC

## 2023-02-14 NOTE — ASSESSMENT & PLAN NOTE
2D echo reveals EF 25 to 30%  Continue carvedilol  Cardiology plans for ischemic work-up noted  Cardiology following

## 2023-02-14 NOTE — PROGRESS NOTES
Progress Note - Neurology   Libra Single 75 y o  female MRN: [id number]  Unit/Bed#: [facility] 710-01 Encounter: [id number]      Assessment/Plan   * Stroke [facility])  Assessment & Plan  75 y o  female with CHF, SHAR, nonischemic cardiomyopathy, bipolar disorder, chronic pain disorder, and seronegative arthropathy of multiple sites on Plaquenil     Presented to [facility] on 2/12 as stroke alert for right gaze preference, left-sided sensory deficits/weakness (face/arm/leg), dysarthria, and mild aphasia  NIHSS 13      CT head revealed acute infarct in the right parietotemporal region  CTA head/neck showing R M1 occlusion s/p mechanical thrombectomy TICI 3 revascularization  Patient was not a TNK candidate due to being outside the time window  · MRI brain revealed acute/subacute infarct in the right MCA territory with superimposed petechial hemorrhage and punctate acute/subactue infarct in the right cerebellum  · Echo: EF 25-30% with global hypokinesis, moderately dilated left atrium, normal right atrium  · EF significantly reduced from Echo performed on 4/23/2022 (EF at that time was 65%)  · CT head 2/13 with evolving right MCA territory strokes with hyperdensity, likely representing a combination of resolving retained contrast and superimposed petechial hemorrhage  · LDL 84, Cholesterol 181, A1c 5 0    Etiology for M1 occlusion and infarcts in different vascular territories likely cardioembolic (possibly due to low EF of 25-30% vs undiagnosed paroxysmal A-fib given dilated left atrium)  Patient does report recent office visit with her cardiologist where she was informed that she had tachycardia, denying Afib       Plan:  - CT head repeated this morning (02/14); still with concern for subcortical and cortical hemorrhagic conversion; and with minimal increase in L midline shift   -would curbside neurosurgery to see if any change in plan given the increase in shift (likely not though, as neurologic exam is stable/unchanged today)  - Would still hold on any AP/AC; repeat CT head in 10 days (2/23):   -during that repeat CT: if hemorrhage improved, but still present to a degree would start aspirin 81 mg daily   -when hemorrhage fully resolved radiographically, would then transition to anticoagulation (ideally Eliquis 5 mg BID)  - Heart failure/cardiology following with cardiomyopathy   -ok to resume Coreg from neuro standpoint  - Continue atorvastatin 40 mg daily  - SBP <140   - Goal euglycemia, normothermia  - Telemetry  - Frequent neurochecks  - PT/OT/speech  - Stroke education  - STAT CT head with any acute change in neurologic exam  - Medical management supportive care per primary team, notify with changes    HTN (hypertension)  Assessment & Plan  - BP on presentation to [facility] 133/75  - SBP goal <140  - Management per ICU team  -as mentioned above ok to resume Coreg, discussed with heart failure team    Bipolar depression ([facility] [address] )  Assessment & Plan  - On Wellbutrin, Cymbalta, and Lamictal at baseline  - Management per primary team    Seronegative arthropathy of multiple sites [facility])  Assessment & Plan  - On Plaquenil     Chronic pain syndrome  Assessment & Plan  - History of rib and compression fractures  - On oxycodone, morphine, gabapentin, and Tylenol at home  - Pain management per primary team    Discussed plan of care with attending neurologist      Yanyaashish Arreaga will need follow up in in 6 weeks with neurovascular attending or advance practitioner  She will require a CT head without contrast within 10 days of discharge (02/23)  Subjective:   Patient resting in bedside chair, working with speech therapy  Doing ok today, notes focal deficits are "about the same" today  Still with dysarthria per speech therapy, also with some instability earlier today when working with therapy  Vitals: Blood pressure 129/89, pulse (!) 113, temperature 97 8 °F (36 6 °C), resp   rate 16, height 5' 3" (1 6 m), weight 65 8 kg (145 lb 1 oz), SpO2 96 %  ,Body mass index is 25 7 kg/m²  Examined alongside Dr Antoni Schwarz  Physical Exam:   Physical Exam  Constitutional:       Appearance: Normal appearance  HENT:      Head: Normocephalic and atraumatic  Eyes:      Extraocular Movements: Extraocular movements intact and EOM normal       Conjunctiva/sclera: Conjunctivae normal       Pupils: Pupils are equal, round, and reactive to light  Cardiovascular:      Rate and Rhythm: Normal rate  Pulses: Normal pulses  Abdominal:      General: There is no distension  Musculoskeletal:         General: Normal range of motion  Cervical back: Normal range of motion  Skin:     General: Skin is warm and dry  Neurological:      Mental Status: She is alert  Coordination: Finger-nose-finger test: Minimal left dysmetria, but not out of proportion to proximal left upper extremity weakness  Neurologic Exam     Mental Status     Awake and alert, fully oriented  Mild dysarthria, but fully intelligible  No aphasia, following commands  Cranial Nerves     CN II   Visual fields full to confrontation  CN III, IV, VI   Pupils are equal, round, and reactive to light  Extraocular motions are normal      CN V   Facial sensation intact  CN VII   Left facial weakness: Slight nasolabial fold flattening on the left, smile appears symmetric though with full effort  CN VIII   CN VIII normal      CN IX, X   CN IX normal    CN X normal      CN XI   CN XI normal      CN XII   CN XII normal    Still with mild left visual neglect with double simultaneous visual stimuli on each side  Motor Exam   Muscle bulk: normal  Overall muscle tone: normal  Right arm pronator drift: absent  Left arm pronator drift: present (Minimal)Still with just mild left upper and lower extremity weakness, (proximally slightly worse than distally)    Deltoid, left approximately 4 -, 4; bicep/tricep/ 4 or 4+      4 - hip flexor on the left, knee range of motion 4+, dorsiflexion/plantarflexion full  Sensory Exam   Light touch normal      Gait, Coordination, and Reflexes     Coordination   Finger-nose-finger test: Minimal left dysmetria, but not out of proportion to proximal left upper extremity weakness  Lab, Imaging and other studies:   CT head wo contrast   Final Result by Jonna Penny MD (02/14 1049)      Evolution of large right MCA territory infarct with decreased contrast staining, persistent small acute parenchymal hematoma in right basal ganglia (worse in right putamen), and persistent petechial cortical hemorrhage involving right insula, right    posterior temporal, and right parietal lobes status post right MCA mechanical thrombectomy  New 0 3 cm leftward midline shift with persistent partial effacement of right lateral ventricle  Tiny acute infarct in right cerebellum is better evaluated on MRI brain 2/12/2023  The study was marked in [facility] for immediate notification  Workstation performed: [id number]         CT head wo contrast   Final Result by Derek Deutsch MD (02/13 0750)      Evolving right MCA territory infarcts with associated hyperdensity likely representing a combination of resolving retained contrast and superimposed petechial hemorrhage  Follow-up imaging is recommended to document stability/resolution  Workstation performed: [id number]         MRI brain wo contrast   Final Result by Derek Deutsch MD (02/13 0731)   Addendum (preliminary) 1 of 1 by Derek Deutsch MD (02/13 0731)   ADDENDUM:      A 0 5 cm punctate focus of acute to subacute ischemia is noted within the    right cerebellum (series 3/10)        Final      Acute to subacute right MCA territory infarct with superimposed petechial hemorrhage,       Workstation performed: [id number]         IR stroke alert    (Results Pending)       CBC:   Results from last 7 days   Lab Units 02/13/23  0512 02/12/23 0302   WBC Thousand/uL 7 87 6 86   RBC Million/uL 4 38 4 66   HEMOGLOBIN g/dL 13 5 14 5   HEMATOCRIT % 41 5 44 5   MCV fL 95 96   PLATELETS Thousands/uL 197 216   , BMP/CMP:   Results from last 7 days   Lab Units 02/13/23 0512 02/12/23 0302   SODIUM mmol/L 143 141   POTASSIUM mmol/L 3 8 3 9   CHLORIDE mmol/L 117* 109*   CO2 mmol/L 21 28   BUN mg/dL 11 13   CREATININE mg/dL 0 75 0 94   CALCIUM mg/dL 9 6 10 8*   EGFR ml/min/1 73sq m 82 62   , Vitamin B12:   , HgBA1C:   Results from last 7 days   Lab Units 02/13/23 0512   HEMOGLOBIN A1C % 5 0   , TSH:   , Coagulation:   Results from last 7 days   Lab Units 02/12/23 0302   INR  0 90   , Lipid Profile:   Results from last 7 days   Lab Units 02/13/23 0512   HDL mg/dL 81   LDL CALC mg/dL 84   TRIGLYCERIDES mg/dL 81   , Ammonia:   , Urinalysis:       Invalid input(s): URIBILINOGEN, Drug Screen:   , Medication Drug Levels:       Invalid input(s): CARBAMAZEPINE,  PHENOBARB, LACOSAMIDE, OXCARBAZEPINE     VTE Prophylaxis: Sequential compression device (Venodyne)  and Enoxaparin (Lovenox)    Total time spent today 20 minutes

## 2023-02-14 NOTE — PLAN OF CARE
Problem: Neurological Deficit  Goal: Neurological status is stable or improving  Description: Interventions:  - Monitor and assess patient's level of consciousness, motor function, sensory function, and level of assistance needed for ADLs  - Monitor and report changes from baseline  Collaborate with interdisciplinary team to initiate plan and implement interventions as ordered  - Provide and maintain a safe environment  - Consider seizure precautions  - Consider fall precautions  - Consider aspiration precautions  - Consider bleeding precautions  Outcome: Progressing     Problem: Activity Intolerance/Impaired Mobility  Goal: Mobility/activity is maintained at optimum level for patient  Description: Interventions:  - Assess and monitor patient  barriers to mobility and need for assistive/adaptive devices  - Assess patient's emotional response to limitations  - Collaborate with interdisciplinary team and initiate plans and interventions as ordered  - Encourage independent activity per ability   - Maintain proper body alignment  - Perform active/passive rom as tolerated/ordered    - Plan activities to conserve energy   - Turn patient as appropriate  Outcome: Progressing

## 2023-02-14 NOTE — SPEECH THERAPY NOTE
SLP Motor Speech Evaluation      Patient Name: Pillo Lion    ARBBQ'Y Date: 2/14/2023     Problem List  Principal Problem:    Stroke [facility])  Active Problems:    Chronic pain syndrome    Ambulatory dysfunction    Seronegative arthropathy of multiple sites ([place] [address] )    Bipolar depression ([place] [address] )    Cardiomyopathy ([place] [address] )    HTN (hypertension)    HFrEF (heart failure with reduced ejection fraction) ([place] [address] )      Past Medical History  Past Medical History:   Diagnosis Date   • Anxiety    • Asthma    • Bipolar depression ([place] [address] )    • Chronic narcotic dependence ([place] [address] )    • Chronic pain    • Depression    • Enterovirus heart infection    • SHAR (obstructive sleep apnea)    • Osteoarthritis    • Peripheral neuropathy    • Plantar fasciitis of right foot    • Senile osteoporosis    • Seronegative arthropathy of multiple sites ([place] [address] )    • Undifferentiated connective tissue disease ([place] [address] )        Past Surgical History  Past Surgical History:   Procedure Laterality Date   • BREAST IMPLANT     • FIXATION KYPHOPLASTY LUMBAR SPINE     • HYSTERECTOMY     • INCONTINENCE SURGERY N/A    • NASAL SEPTOPLASTY W/ TURBINOPLASTY N/A    • RECTAL PROLAPSE REPAIR N/A    • REPAIR RECTOCELE     • RHINOPLASTY N/A          Impressions: The patient presents with a mild dysarthria characterized by decreased articulatory precision, decreased breath support and hyponasal speech quality       Speech Therapy recommended:  yes, 1-3 x weekly while hospitalized    Patient's goal:  "I know I sound funny still"    LONG TERM GOALS:  -The patient will demonstrate intelligible speech in all activities of daily living including dynamic conversation    SHORT TERM GOALS:    Respiration and Phonation  -Patient will utilize the strategy of increased respiratory support (ie “inhale more deeply”) before beginning utterances in conversational speech tasks on 9 of 10 trials with minimal cues    Articulation  -Patient will demonstrate adequate anterior lingual strength, ROM and control for  t, d, n,l and produce 20 intelligible (words, phrases, sentences) related to basic personal and medical  needs  HISTORY AND PHYSICAL:     HX and PE limited by: None  Zahra Riley is a 75 y o  female with a PMHx of chronic pain disorder, bipolar, anxiety/depression, asthma, SHAR, seronegative arthropathy, and undifferentiated CTD who initially presented to [facility] due to acute onset of left sided weakness, slurred speech, and ataxia  Symptoms led to patient tripping and falling  Patient's brother initially called EMS due to concern for potential overdose  Last known normal 6PM on 2/11/2023  NIH 13 on arrival  Stroke Alert called at 2:55 AM  CT head completed and revealed area of loss of the gray-white matter differentiation involving the right parietotemporal region suspicious for acute ischemia  CTA head/neck confirmed a distal right MCA M1 segment with abrupt cut off and collateralized blood flow tihin the distal right MCA branches  Endovascular alert activated at 4:10 AM  No TNK administered as patient was out of administration window  Patient transferred to [facility] for CT perfusion scan and possible thrombectomy   Perfusion scan deferred on arrival  Patient now presents to the neuro ICU for continued monitoring following angiogram resulting in TICI 3 revascularization of right M1 occlusion       Speech and Swallowing Mechanism Exam   Facial: left facial droop  Labial: decreased ROM left side  Lingual: WFL  Velum: symmetrical  Mandible: adequate ROM  Dentition: adequate  Respiratory Support: on RA    Respiration and Phonation  Able to sustain phonation counting from 1 to 20  Vocal Quality:  dysphonic and some tremors      Articulation:  Single Syllable Words: precise articulation: impaired  Multisyllabic Words: impaired  Phrase Level: impaired  Sentence Level: impaired  Conversational Speech:  Impaired     Diadochokinesis:   Not formally assessed     Resonation: hyponasal     S/S Oral apraxia: None    S/S Verbal Apraxia:  None      INTELLIGIBILITY at the WORD LEVEL:  wfl      INTELLIGIBILITY at the PHRASE LEVEL:  wfl    INTELLIGIBILITY at the SENTENCE LEVEL: min decreased     INTELLIGIBILITY in CONVERSATIONAL SPEECH:  Min decreased      Conversation:  Decreased breath support for speech  Decreased coordination of respiration with speech attempts  Imprecise articulation  Impaired prosody

## 2023-02-14 NOTE — PHYSICAL THERAPY NOTE
Physical Therapy Treatment Note       02/14/23 1030   PT Last Visit   PT Visit Date 02/14/23   Note Type   Note Type Treatment   Pain Assessment   Pain Assessment Tool 0-10   Pain Score No Pain   Restrictions/Precautions   Weight Bearing Precautions Per Order No   Other Precautions Chair Alarm; Bed Alarm;Multiple lines;Telemetry; Fall Risk;Pain   General   Chart Reviewed Yes   Family/Caregiver Present No   Cognition   Overall Cognitive Status WFL   Arousal/Participation Responsive   Attention Attends with cues to redirect   Orientation Level Oriented X4   Memory Unable to assess   Following Commands Follows one step commands inconsistently   Subjective   Subjective states she feels OK   willing to participate  Bed Mobility   Supine to Sit 3  Moderate assistance   Additional items Assist x 1; Increased time required   Additional Comments sat EOB x 5-7 min, focus on tolerance  maintains w/ min  A   Transfers   Sit to Stand 3  Moderate assistance   Additional items Assist x 2   Stand to Sit 3  Moderate assistance   Additional items Assist x 2   Ambulation/Elevation   Gait pattern   (slow, ataxia, short step length, narrow AYLA)   Gait Assistance 3  Moderate assist   Additional items Assist x 2   Assistive Device   (HHA of 2)   Distance 3'x1 from bed to chair w/ 5-7 min seated rest, followed by 18'x1   Balance   Static Sitting Fair -   Dynamic Sitting Poor +   Static Standing Poor +   Dynamic Standing Poor   Ambulatory Poor   Endurance Deficit   Endurance Deficit Yes   Endurance Deficit Description weakness, fatigue,   Activity Tolerance   Activity Tolerance Patient limited by fatigue;Treatment limited secondary to medical complications (Comment)   Nurse Made Aware yes   Assessment   Prognosis Good   Problem List Decreased strength;Decreased endurance; Impaired balance;Decreased mobility; Decreased coordination;Decreased cognition; Impaired judgement;Decreased safety awareness   Assessment Pt seen for session for setup, bed mob, time spent EOB, transfers, gait w/ rest time, repositioning  Pt cooperative w/ session, wants to get OOB  Multiple LOB noted in both sitting and standing  Needs cues for sequencing, step length w/ gait  continue to recommend rehab at d/c   Goals   Patient Goals to get better   STG Expiration Date 02/25/23   PT Treatment Day 1   Plan   Treatment/Interventions Functional transfer training;LE strengthening/ROM; Therapeutic exercise; Endurance training;Patient/family training;Bed mobility;Gait training;Equipment eval/education   Progress Progressing toward goals   PT Frequency 3-5x/wk   Recommendation   PT Discharge Recommendation Post acute rehabilitation services   AM-PAC Basic Mobility Inpatient   Turning in Flat Bed Without Bedrails 3   Lying on Back to Sitting on Edge of Flat Bed Without Bedrails 2   Moving Bed to Chair 1   Standing Up From Chair Using Arms 1   Walk in Room 1   Climb 3-5 Stairs With Railing 1   Basic Mobility Inpatient Raw Score 9   Highest Level Of Mobility   JH-HLM Goal 3: Sit at edge of bed   JH-HLM Achieved 6: Walk 10 steps or more     Christophe Morgan PT, DPT CSRS

## 2023-02-14 NOTE — PROGRESS NOTES
Cardiology Progress note  Unit/Bed#: [facility] 710-01 Encounter: [id number]        Elysia Sosa 75 y o  female [id number]  Hospital Stay Days: 2    Assessment and Plan      Current Problem List   Principal Problem:    Stroke [facility])  Active Problems:    Chronic pain syndrome    Seronegative arthropathy of multiple sites ([place] [address] )    Bipolar depression ([place] [address] )    HTN (hypertension)    HFrEF (heart failure with reduced ejection fraction) ([facility])    Assessment/Plan:       1   Cardiomyopathy - Patient with previous history of NICM per notes - although we have no records of actual even but rather just his caridologist at [facility] notes  Reported history of EF 10-15% in early 2000s was placed on GDMT with improvement of EF  Had a EF of 50% in 2019 then improved with restarting coreg to 60% now with reduction in EF to 25-30% in the setting of acute stroke  Etiologies include ICM vs  Stress induced CM as most likely possible etiologies - although no definite exclusion of other etiologies at this time  ? Restart Coreg when able to from neurology standpoint  ? Will need eventual ischemic work up  ? I and O  ? Daily weights  ? Will hold off on further workup for other non ischemic etiologies pending ischemic work up and EF trend on GDMT  ? Monitor on tele  ? Does not appear to be low output on exam - tachycardia likely secondary to stroke  2   Valvular abnormalities - noted to have mild to moderate AI mild MR  ?  Cont  To follow up outpatient  3  Seronegative arthropathy   ? On plaquenil,   4  NSVT  ·  Noted on monitor - likely secondary to acute catecholamine state - non sustained no symptoms  Cont  To monitor  Eventual ischemic work up  Subjective     Patient seen and examined  Did have episode of NSVT yesterday that was short in duration  No symptoms  No chest pain  No shortness of breath  I and O are inaccurate secondary to unmeasured urine occurrances  Weight up one pound since yesterday  Sinus tach otherwise on monitor  To go for CT head today  Objective     Vitals: Temp (24hrs), Av 8 °F (36 6 °C), Min:97 4 °F (36 3 °C), Max:98 1 °F (36 7 °C)  Current: Temperature: 97 8 °F (36 6 °C)  Patient Vitals for the past 24 hrs:   BP Temp Temp src Pulse Resp SpO2 Weight   23 0707 129/89 97 8 °F (36 6 °C) -- (!) 113 16 96 % --   23 0553 -- -- -- -- -- -- 65 8 kg (145 lb 1 oz)   23 05 140/88 97 9 °F (36 6 °C) -- 94 -- 97 % --   23 0519 140/88 -- -- 98 -- 96 % --   23 0000 139/90 97 7 °F (36 5 °C) Axillary (!) 110 18 96 % --   23 1933 138/90 98 1 °F (36 7 °C) -- (!) 120 -- 96 % --   23 1634 132/81 (!) 97 4 °F (36 3 °C) -- (!) 108 16 97 % --   23 1404 126/80 -- -- (!) 120 -- 98 % --   23 1300 126/80 -- -- 102 -- 97 % --   23 1200 124/78 -- -- 100 -- 98 % --   23 1000 135/84 -- -- (!) 106 -- 97 % --    Body mass index is 25 7 kg/m²  Physical Exam:  Physical Exam  Constitutional:       Appearance: Normal appearance  Cardiovascular:      Rate and Rhythm: Normal rate and regular rhythm  Pulmonary:      Effort: Pulmonary effort is normal  No respiratory distress  Breath sounds: No rhonchi  Abdominal:      General: Abdomen is flat  There is no distension  Musculoskeletal:      Right lower leg: No edema  Left lower leg: No edema  Skin:     General: Skin is warm  Neurological:      Mental Status: She is alert           Invasive Devices     Peripheral Intravenous Line  Duration           Peripheral IV 23 Left Forearm 1 day    Peripheral IV 23 Left Antecubital 1 day          Drain  Duration           External Urinary Catheter 1 day                    Labs:   Results from last 7 days   Lab Units 23  0512 23  0302   WBC Thousand/uL 7 87 6 86   HEMOGLOBIN g/dL 13 5 14 5   HEMATOCRIT % 41 5 44 5   PLATELETS Thousands/uL 197 216   NEUTROS PCT % 80*  --    MONOS PCT % 8  --       Results from last 7 days   Lab Units 02/13/23  0512 02/12/23  0302   SODIUM mmol/L 143 141   POTASSIUM mmol/L 3 8 3 9   CHLORIDE mmol/L 117* 109*   CO2 mmol/L 21 28   BUN mg/dL 11 13   CREATININE mg/dL 0 75 0 94   CALCIUM mg/dL 9 6 10 8*   MAGNESIUM mg/dL 1 7  --    PHOSPHORUS mg/dL 3 2  --    INR   --  0 90   PTT seconds  --  24   EGFR ml/min/1 73sq m 82 62     Results from last 7 days   Lab Units 02/12/23  0302   INR  0 90   PTT seconds 24             No results found for: PHART, OXD3LIM, PO2ART, HSL2UQZ, M7CKSPXS, BEART, SOURCE  No components found for: HIV1X2  No results found for: HAV, HEPAIGM, HEPBIGM, HEPBCAB, HBEAG, HEPCAB  No results found for: SPEP, UPEP   Lab Results   Component Value Date    HGBA1C 5 0 02/13/2023    HGBA1C 5 2 09/13/2022     No results found for: CHOL   Lab Results   Component Value Date    HDL 81 02/13/2023      Lab Results   Component Value Date    LDLCALC 84 02/13/2023      Lab Results   Component Value Date    TRIG 81 02/13/2023     No components found for: PROCAL      Micro:      Urinalysis:  Lab Results   Component Value Date    BDZUR Negative 06/25/2021    COCAINEUR Negative 06/25/2021    OPIATEUR Positive (A) 06/25/2021    PCPUR Negative 06/25/2021    THCUR Positive (A) 06/25/2021    ETOH 3 04/22/2022    ETOH <3 06/25/2021    ACTMNPHEN <2 (L) 64/39/4190    SALICYLATE <3 (L) 47/95/5115          Invalid input(s): URIBILINOGEN        Intake and Outputs:  I/O       02/12 0701 02/13 0700 02/13 0701 02/14 0700 02/14 0701  02/15 0700    P  O  200  180    I V  (mL/kg) 1989 2 (30 4)      Total Intake(mL/kg) 2189 2 (33 5)  180 (2 7)    Urine (mL/kg/hr) 500 (0 3) 400 (0 3)     Stool  0     Total Output 500 400     Net +1689 2 -400 +180           Unmeasured Urine Occurrence 1 x 2 x     Unmeasured Stool Occurrence  1 x         Nutrition:  Diet Dysphagia/Modified Consistency; Dysphagia 1-Pureed;  Thin Liquid  Radiology Results:   CT head wo contrast   Final Result by Derek Deutsch MD (02/13 9290)      Evolving right MCA territory infarcts with associated hyperdensity likely representing a combination of resolving retained contrast and superimposed petechial hemorrhage  Follow-up imaging is recommended to document stability/resolution  Workstation performed: [id number]         MRI brain wo contrast   Final Result by Estevan Torres MD (02/13 0731)   Addendum (preliminary) 1 of 1 by Estevan Torres MD (02/13 0731)   ADDENDUM:      A 0 5 cm punctate focus of acute to subacute ischemia is noted within the    right cerebellum (series 3/10)        Final      Acute to subacute right MCA territory infarct with superimposed petechial hemorrhage,       Workstation performed: [id number]         IR stroke alert    (Results Pending)   CT head wo contrast    (Results Pending)     Scheduled Medications:  atorvastatin, 40 mg, QPM  buPROPion, 100 mg, BID  DULoxetine, 60 mg, BID  enoxaparin, 40 mg, Q24H KHARI  gabapentin, 300 mg, BID  hydroxychloroquine, 200 mg, Once per day on Mon Tue Wed Thu Fri  [START ON 2/18/2023] hydroxychloroquine, 400 mg, Once per day on Sun Sat  lamoTRIgine, 150 mg, HS  melatonin, 6 mg, HS  morphine, 15 mg, Q12H [address]      PRN MEDS:  acetaminophen, 975 mg, Q6H PRN  aluminum-magnesium hydroxide-simethicone, 30 mL, Q4H PRN  hydrALAZINE, 10 mg, Q6H PRN  oxyCODONE, 2 5 mg, Q6H PRN   Or  oxyCODONE, 5 mg, Q6H PRN      Last 24 Hour Meds: :   Medication Administration - last 24 hours from 02/13/2023 0927 to 02/14/2023 1220       Date/Time Order Dose Route Action Action by     02/13/2023 1257 EST sodium chloride 0 9 % infusion 75 mL/hr Intravenous Not Given Shazia Hollis RN     02/13/2023 1615 EST chlorhexidine (PERIDEX) 0 12 % oral rinse 15 mL -- Mouth/Throat MAR Arsh Carrillo MD     02/13/2023 1608 EST chlorhexidine (PERIDEX) 0 12 % oral rinse 15 mL -- Mouth/Throat MAR Hold Automatic Transfer Provider     02/13/2023 0940 EST chlorhexidine (PERIDEX) 0 12 % oral rinse 15 mL 15 mL Mouth/Throat Given Chau Lopez Ike Cassette, RN     02/13/2023 1708 EST atorvastatin (LIPITOR) tablet 40 mg 40 mg Oral Given Brian Seymour     02/13/2023 1615 EST atorvastatin (LIPITOR) tablet 40 mg -- Oral MAR Leana Negrete     02/13/2023 1608 EST atorvastatin (LIPITOR) tablet 40 mg -- Oral MAR Hold Automatic Transfer Provider     02/14/2023 0900 EST enoxaparin (LOVENOX) subcutaneous injection 40 mg -- Subcutaneous Canceled Entry Lillian Mary, RN     02/14/2023 3761 EST enoxaparin (LOVENOX) subcutaneous injection 40 mg 40 mg Subcutaneous Given Lillain Mary, RN     02/13/2023 1615 EST enoxaparin (LOVENOX) subcutaneous injection 40 mg -- Subcutaneous MAR Leana Bellwood     02/13/2023 1608 EST enoxaparin (LOVENOX) subcutaneous injection 40 mg -- Subcutaneous MAR Hold Automatic Transfer Provider     02/13/2023 0932 EST enoxaparin (LOVENOX) subcutaneous injection 40 mg 40 mg Subcutaneous Given Sidney Alcocer, RN     02/13/2023 1615 EST hydrALAZINE (APRESOLINE) injection 10 mg -- Intravenous MAR Leana Negrete     02/13/2023 1608 EST hydrALAZINE (APRESOLINE) injection 10 mg -- Intravenous MAR Hold Automatic Transfer Provider     02/13/2023 1615 EST acetaminophen (TYLENOL) tablet 975 mg -- Oral MAR Leana Negrete     02/13/2023 1608 EST acetaminophen (TYLENOL) tablet 975 mg -- Oral MAR Hold Automatic Transfer Provider     02/13/2023 1615 EST buPROPion [facility]) tablet 100 mg -- Oral MAR Leana Negrete     02/13/2023 1608 EST buPROPion (WELLBUTRIN) tablet 100 mg -- Oral MAR Hold Automatic Transfer Provider     02/13/2023 0932 EST buPROPion [facility]) tablet 100 mg 100 mg Oral Given Sidney Alcocer, RN     02/14/2023 0900 EST DULoxetine (CYMBALTA) delayed release capsule 60 mg -- Oral Canceled Entry Lillian Mary, RN     02/14/2023 5708 EST DULoxetine (CYMBALTA) delayed release capsule 60 mg 60 mg Oral Given Lillian Mary RN     02/13/2023 6689 EST DULoxetine (CYMBALTA) delayed release capsule 60 mg 60 mg [facility]     02/13/2023 1615 EST DULoxetine (CYMBALTA) delayed release capsule 60 mg -- Oral MAR Everardo Flair     02/13/2023 1608 EST DULoxetine (CYMBALTA) delayed release capsule 60 mg -- Oral MAR Hold Automatic Transfer Provider     02/13/2023 0932 EST DULoxetine (CYMBALTA) delayed release capsule 60 mg 60 mg Oral Given Adam Fried, RN     02/14/2023 0900 EST gabapentin (NEURONTIN) capsule 300 mg -- Oral Canceled Entry Orpah Mendoza, RN     02/14/2023 3194 EST gabapentin (NEURONTIN) capsule 300 mg 300 mg Oral Given Orpah Mendoza, RN     02/13/2023 1709 EST gabapentin (NEURONTIN) capsule 300 mg 300 mg Oral Given [facility]     02/13/2023 1615 EST gabapentin (NEURONTIN) capsule 300 mg -- Oral MAR Everardo Flair     02/13/2023 1608 EST gabapentin (NEURONTIN) capsule 300 mg -- Oral MAR Hold Automatic Transfer Provider     02/13/2023 0932 EST gabapentin (NEURONTIN) capsule 300 mg 300 mg Oral Given Adam Fried, RN     02/13/2023 2317 EST lamoTRIgine (LaMICtal) tablet 150 mg 150 mg Oral Given Herculaneum Christopher, RN     02/13/2023 1615 EST lamoTRIgine (LaMICtal) tablet 150 mg -- Oral MAR Everardo Flair     02/13/2023 1608 EST lamoTRIgine (LaMICtal) tablet 150 mg -- Oral MAR Hold Automatic Transfer Provider     02/14/2023 3135 EST oxyCODONE (ROXICODONE) IR tablet 2 5 mg -- Oral See Alternative Orpah Mendoza, RN     02/13/2023 2003 EST oxyCODONE (ROXICODONE) IR tablet 2 5 mg -- Oral See Alternative [facility], RN     02/13/2023 1615 EST oxyCODONE (ROXICODONE) IR tablet 2 5 mg -- Oral MAR Everardo Flair     02/13/2023 1608 EST oxyCODONE (ROXICODONE) IR tablet 2 5 mg -- Oral MAR Hold Automatic Transfer Provider     02/14/2023 0742 EST oxyCODONE (ROXICODONE) IR tablet 5 mg 5 mg Oral Given Orpah Mendoza, RN     02/13/2023 2003 EST oxyCODONE (ROXICODONE) IR tablet 5 mg 5 mg Oral Given Luis Fernando White RN     02/13/2023 1615 EST oxyCODONE (ROXICODONE) IR tablet 5 mg -- Oral MAR [facility]     02/13/2023 1608 EST oxyCODONE (ROXICODONE) IR tablet 5 mg -- Oral MAR Hold Automatic Transfer Provider     02/13/2023 1615 EST hydroxychloroquine (PLAQUENIL) tablet 200 mg -- Oral MAR [facility]     02/13/2023 1608 EST hydroxychloroquine (PLAQUENIL) tablet 200 mg -- Oral MAR Hold Automatic Transfer Provider     02/13/2023 0933 EST hydroxychloroquine (PLAQUENIL) tablet 200 mg 200 mg Oral Given Wandy Sherman, JULISA     02/13/2023 1615 EST hydroxychloroquine (PLAQUENIL) tablet 400 mg -- Oral MAR [facility]     02/13/2023 1608 EST hydroxychloroquine (PLAQUENIL) tablet 400 mg -- Oral MAR Hold Automatic Transfer Provider     02/13/2023 2317 EST melatonin tablet 6 mg 6 mg Oral Given Luis Fernando Loza, JULISA     02/13/2023 1615 EST melatonin tablet 6 mg -- Oral [facility]     02/13/2023 1608 EST melatonin tablet 6 mg -- Oral MAR Hold Automatic Transfer Provider     02/14/2023 0900 EST morphine (MS CONTIN) ER tablet 15 mg -- Oral Canceled Entry Abby Longo, JULISA     02/14/2023 0741 EST morphine (MS CONTIN) ER tablet 15 mg 15 mg Oral Given Abby Longo RN     02/13/2023 2317 EST morphine (MS CONTIN) ER tablet 15 mg 15 mg Oral Given Luis Fernando Loza RN     02/13/2023 1615 EST morphine (MS CONTIN) ER tablet 15 mg -- Oral [facility]     02/13/2023 1608 EST morphine (MS CONTIN) ER tablet 15 mg -- Oral MAR Hold Automatic Transfer Provider     02/13/2023 1347 EST morphine (MS CONTIN) ER tablet 15 mg 15 mg Oral Given Wandy Sherman, JULISA     02/13/2023 2322 EST aluminum-magnesium hydroxide-simethicone (MYLANTA) oral suspension 30 mL 30 mL Oral Given Luis Fernando Loza RN          PLEASE NOTE:  This encounter was completed utilizing the [employer] Direct Speech Voice Recognition Software   Grammatical errors, random word insertions, pronoun errors and incomplete sentences are occasional consequences of the system due to software limitations, ambient noise and hardware issues  These may be missed by proof reading prior to affixing electronic signature  Any questions or concerns about the content, text or information contained within the body of this dictation should be directly addressed to the physician for clarification  Please do not hesitate to call me directly if you have any any questions or concerns

## 2023-02-14 NOTE — PLAN OF CARE
Problem: PHYSICAL THERAPY ADULT  Goal: Performs mobility at highest level of function for planned discharge setting  See evaluation for individualized goals  Description: Treatment/Interventions: OT, Spoke to case management, Spoke to nursing, Gait training, Bed mobility, Patient/family training, Endurance training, LE strengthening/ROM, Functional transfer training          See flowsheet documentation for full assessment, interventions and recommendations  Outcome: Progressing  Note: Prognosis: Good  Problem List: Decreased strength, Decreased endurance, Impaired balance, Decreased mobility, Decreased coordination, Decreased cognition, Impaired judgement, Decreased safety awareness  Assessment: Pt seen for session for setup, bed mob, time spent EOB, transfers, gait w/ rest time, repositioning  Pt cooperative w/ session, wants to get OOB  Multiple LOB noted in both sitting and standing  Needs cues for sequencing, step length w/ gait  continue to recommend rehab at d/c  Barriers to Discharge: Decreased caregiver support, Inaccessible home environment     PT Discharge Recommendation: Post acute rehabilitation services    See flowsheet documentation for full assessment

## 2023-02-14 NOTE — QUICK NOTE
Informed by neurology physician assistant - plan presently to hold on starting aspirin  Outpatient neurology follow-up    [facility]

## 2023-02-14 NOTE — PROGRESS NOTES
[address] [facility]  Progress Note - Farnaz Stone 1955, 75 y o  female MRN: [id number]  Unit/Bed#: [facility] 710-01 Encounter: [id number]  Primary Care Provider: Johan Meyer DO   Date and time admitted to hospital: 2/12/2023  5:00 AM    * Stroke [facility])  Assessment & Plan  Patient with history hypertension presented with left-sided weakness slurred speech and transferred from [place] [address] to [place] [address] for neurology evaluation possible intervention  She was noted to right MCA stroke  Eval by neurology and deemed not a candidate for tPA  S/p mechanical thrombectomy  Continue atorvastatin  Neurosurgery inputs noted  Neurology plans to repeat CT head today before initiating aspirin noted  Physical therapy        HFrEF (heart failure with reduced ejection fraction) ([facility] [address] )  Assessment & Plan  2D echo revealed EF 25 to 30%  Continue carvedilol  Cardiology plans for ischemic work-up noted  Cardiology following      HTN (hypertension)  Assessment & Plan  Monitor blood pressures  Carvedilol  Avoid hypotension    Cardiomyopathy ([facility] [address] )  Assessment & Plan  2D echo reveals EF 25 to 30%  Continue carvedilol  Cardiology plans for ischemic work-up noted  Cardiology following    Bipolar depression ([facility] [address] )  Assessment & Plan  Continue duloxetine, bupropion  Outpatient follow-up      Seronegative arthropathy of multiple sites [facility])  Assessment & Plan  Continue Plaquenil  Outpatient rheumatology notes noted  Outpatient rheumatology follow-up    Ambulatory dysfunction  Assessment & Plan  Safe ambulation  Fall precautions  Physical therapy    Chronic pain syndrome  Assessment & Plan  Patient history of chronic pain  Prior records noted  Patient chronically on MS Contin 15 mg twice daily  Bowel regimen              VTE Pharmacologic Prophylaxis: VTE Score: 8 High Risk (Score >/= 5) - Pharmacological DVT Prophylaxis Ordered: enoxaparin (Lovenox)   Sequential Compression Devices Ordered  Patient Centered Rounds: I performed bedside rounds with nursing staff today  Discussions with Specialists or Other Care Team Provider:     Education and Discussions with Family / Patient: Discussed with the patient, daughter at bedside updated questions answered  Total Time Spent on Date of Encounter in care of patient: 35 minutes This time was spent on one or more of the following: performing physical exam; counseling and coordination of care; obtaining or reviewing history; documenting in the medical record; reviewing/ordering tests, medications or procedures; communicating with other healthcare professionals and discussing with patient's family/caregivers  Current Length of Stay: 2 day(s)  Current Patient Status: Inpatient   Certification Statement: The patient will continue to require additional inpatient hospital stay due to As outlined  Discharge Plan: Awaiting cardiology plans for disposition planning, physical therapy recommends rehab Case management following    Code Status: Level 1 - Full Code    Subjective:     Patient comfortably sitting up in chair  Daughter at bedside  History chart labs medications reviewed  Patient reports improving symptoms  Encouraged incentive spirometry    Objective:     Vitals:   Temp (24hrs), Av 8 °F (36 6 °C), Min:97 4 °F (36 3 °C), Max:98 1 °F (36 7 °C)    Temp:  [97 4 °F (36 3 °C)-98 1 °F (36 7 °C)] 97 8 °F (36 6 °C)  HR:  [] 113  Resp:  [16-18] 16  BP: (129-140)/(81-90) 129/89  SpO2:  [96 %-97 %] 96 %  Body mass index is 25 7 kg/m²  Input and Output Summary (last 24 hours):      Intake/Output Summary (Last 24 hours) at 2023 1510  Last data filed at 2023 0741  Gross per 24 hour   Intake 180 ml   Output --   Net 180 ml       Physical Exam:   Physical Exam       Comfortably sitting up in chair  Features of protein calorie malnutrition noted  Neck supple  Lungs diminished breath sounds bilaterally  No additional sounds  Heart sounds S1 and S2 noted  Abdomen soft nontender  Awake obey simple commands  Moves extremities  Dysarthria noted  No pedal edema  No rash    Additional Data:     Labs:  Results from last 7 days   Lab Units 02/13/23  0512   WBC Thousand/uL 7 87   HEMOGLOBIN g/dL 13 5   HEMATOCRIT % 41 5   PLATELETS Thousands/uL 197   NEUTROS PCT % 80*   LYMPHS PCT % 12*   MONOS PCT % 8   EOS PCT % 0     Results from last 7 days   Lab Units 02/13/23  0512   SODIUM mmol/L 143   POTASSIUM mmol/L 3 8   CHLORIDE mmol/L 117*   CO2 mmol/L 21   BUN mg/dL 11   CREATININE mg/dL 0 75   ANION GAP mmol/L 5   CALCIUM mg/dL 9 6   GLUCOSE RANDOM mg/dL 95     Results from last 7 days   Lab Units 02/12/23  0302   INR  0 90     Results from last 7 days   Lab Units 02/12/23  0259   POC GLUCOSE mg/dl 113     Results from last 7 days   Lab Units 02/13/23  0512   HEMOGLOBIN A1C % 5 0           Lines/Drains:  Invasive Devices     Peripheral Intravenous Line  Duration           Peripheral IV 02/12/23 Left Forearm 1 day    Peripheral IV 02/13/23 Left Antecubital 1 day                  Telemetry:  Telemetry Orders (From admission, onward)             48 Hour Telemetry Monitoring  Continuous x 48 hours        References:    Telemetry Guidelines   Question:  Reason for 48 Hour Telemetry  Answer:  Acute Decompensated CHF (continuous diuretic infusion or total diuretic dose > 200 mg daily, associated electrolyte derangement, ionotropic drip, history of ventricular arrhythmia, or new EF <35%)                 Telemetry Reviewed: Sinus rhythm  Indication for Continued Telemetry Use: Acute CHF on >200 mg lasix/day or equivalent dose or with new reduced EF                Imaging: Reviewed radiology reports from this admission including: CT head, MRI brain and ECHO    Recent Cultures (last 7 days):         Last 24 Hours Medication List:   Current Facility-Administered Medications   Medication Dose Route Frequency Provider Last Rate   • acetaminophen  975 mg Oral Q6H PRN Thurmon Douse Prema Montalvo MD     • aluminum-magnesium hydroxide-simethicone  30 mL Oral Q4H PRN Oralia Valdez PA-C     • atorvastatin  40 mg Oral QPM Ramón Loaiza MD     • buPROPion  100 mg Oral BID Ramón Loaiza MD     • carvedilol  6 25 mg Oral BID With Meals Abel Morales DO     • DULoxetine  60 mg Oral BID Ramón Loaiza MD     • enoxaparin  40 mg Subcutaneous Q24H [facility] Ramón Loaiza MD     • gabapentin  300 mg Oral BID Ramón Loaiza MD     • hydrALAZINE  10 mg Intravenous Q6H PRN Ramón Loaiza MD     • hydroxychloroquine  200 mg Oral Once per day on Mon Tue Wed Thu Fri Ramón Loaiza MD     • [START ON 2/18/2023] hydroxychloroquine  400 mg Oral Once per day on Sun Sat Ramón Loaiza MD     • lamoTRIgine  150 mg Oral HS Ramón Loaiza MD     • melatonin  6 mg Oral HS Ramón Loaiza MD     • morphine  15 mg Oral Q12H [facility] Ramón Loaiza MD     • oxyCODONE  2 5 mg Oral Q6H PRN Ramón Loaiza MD      Or   • oxyCODONE  5 mg Oral Q6H PRN Ramón Loaiza MD     • polyethylene glycol  17 g Oral Daily David Velarde MD     • senna-docusate sodium  2 tablet Oral BID David Velarde MD          Today, Patient Was Seen By: David Velarde MD    **Please Note: This note may have been constructed using a voice recognition system  **

## 2023-02-14 NOTE — CASE MANAGEMENT
Case Management Assessment & Discharge Planning Note    Patient name Tesfaye Tovar  Location [address] [place] [address] [address] [facility] [id number]  : 1955 Date 2023       Current Admission Date: 2023  Current Admission Diagnosis:Stroke [facility])   Patient Active Problem List    Diagnosis Date Noted   • HFrEF (heart failure with reduced ejection fraction) ([place] [address] ) 2023   • Stroke ([place] [address] ) 2023   • HTN (hypertension) 2023   • Non-ischemic cardiomyopathy ([place] [address] ) 2023   • Vitamin D insufficiency 2023   • LAILA (acute kidney injury) ([place] [address] ) 2022   • Polypharmacy 2022   • 'light-for-dates' infant with signs of fetal malnutrition 2022   • Bipolar depression ([place] [address] ) 2022   • Rheumatoid arthritis ([place] [address] ) 2022   • Closed fracture of multiple ribs of right side 2022   • Traumatic pneumothorax 2022   • Closed fracture of transverse process of lumbar vertebra ([place] [address] ) 2022   • Liver contusion 2022   • Fall 2022   • Acute pain due to trauma 2022   • Right shoulder pain 2022   • Severe protein-calorie malnutrition ([place] [address] ) 2022   • Undifferentiated connective tissue disease ([place] [address] ) 2021   • Primary generalized (osteo)arthritis 2021   • Peripheral neuropathy 2021   • Senile osteoporosis 2021   • Hx of compression fracture of spine 2021   • Lumbar spondylosis 2021   • Cervical spondylosis 2021   • Anemia 2021   • Chronic pain syndrome    • Asthma    • Ambulatory dysfunction    • Acute metabolic encephalopathy    • Seronegative arthropathy of multiple sites ([place] [address] )    • Gastroesophageal reflux disease without esophagitis    • Mixed stress and urge urinary incontinence    • CHF (congestive heart failure) ([place] [address] )       LOS (days): 2  Geometric Mean LOS (GMLOS) (days): 1 90  Days to GMLOS:-0 3     OBJECTIVE:    Risk of Unplanned Readmission Score: 16 24         Current admission status: Inpatient Preferred Pharmacy:   [facility] [address], [address]  [address]  [place] [address]  Phone: [phone] Fax: [phone]    Primary Care Provider: Edy Green DO    Primary Insurance: MEDICARE  Secondary Insurance: AARP    ASSESSMENT:  Active Health Care Proxies    There are no active Health Care Proxies on file         Advance Directives  Does patient have a [address]?: No  Was patient offered paperwork?: Yes (provided)  Does patient currently have a Health Care decision maker?: No  Does patient have Advance Directives?: No  Was patient offered paperwork?: Yes (provided)  Primary Contact: Kendal Olmedo [phone]         Readmission Root Cause  30 Day Readmission: No    Patient Information  Admitted from[de-identified] Facility (transfer from [facility])  Mental Status: Alert (able to answer with short responses)  During Assessment patient was accompanied by: Not accompanied during assessment  Assessment information provided by[de-identified] Patient  Primary Caregiver: Self  Support Systems: Self, Son, Daughter, Family members  [place] of Residence: [facility]  Type of Current Residence: [address] [place] home  Upon entering residence, is there a bedroom on the main floor (no further steps)?: No  A bedroom is located on the following floor levels of residence (select all that apply):: 2nd Floor  Upon entering residence, is there a bathroom on the main floor (no further steps)?: Yes  Number of steps to 2nd floor from main floor: One Flight  In the last 12 months, was there a time when you were not able to pay the mortgage or rent on time?: No  In the last 12 months, how many places have you lived?: 1  In the last 12 months, was there a time when you did not have a steady place to sleep or slept in a shelter (including now)?: No  Homeless/housing insecurity resource given?: N/A  Living Arrangements: Lives w/ Son, Other (Comment)    Activities of Daily Living Prior to Admission  Functional Status: Independent  Completes ADLs independently?: Yes  Ambulates independently?: Yes  Does patient use assisted devices?: Yes  Assisted Devices (DME) used: [employer], Other (Comment), Shower Chair (standard walker, grab bar)  Does patient currently own DME?: Yes  What DME does the patient currently own?: Straight Amador Jamil, Walker, Shower Chair, Other (Comment) (standard walker, grab bar)  Does patient have a history of Outpatient Therapy (PT/OT)?: No  Does the patient have a history of Short-Term Rehab?: Yes ([place])  Does patient have a history of HHC?: No  Does patient currently have [address]?: No         Patient Information Continued  Income Source: SSI/SSD  Does patient have prescription coverage?: Yes ([employer] in [place])  Within the past 12 months, you worried that your food would run out before you got the money to buy more : Never true  Within the past 12 months, the food you bought just didn't last and you didn't have money to get more : Never true  Food insecurity resource given?: N/A  Does patient receive dialysis treatments?: No  Does patient have a history of substance abuse?: No  Does patient have a history of Mental Health Diagnosis?: Yes (bipolar, depression, anxiety)  Is patient receiving treatment for mental health?: Yes (psychiatrist monthy for medication management, telehealth 2X a month counseling)  Has patient received inpatient treatment related to mental health in the last 2 years?: No         Means of Transportation  Means of Transport to Appts[de-identified] Drives Self  In the past 12 months, has lack of transportation kept you from medical appointments or from getting medications?: No  In the past 12 months, has lack of transportation kept you from meetings, work, or from getting things needed for daily living?: No  Was application for public transport provided?: N/A        DISCHARGE DETAILS:    Discharge planning discussed with[de-identified] naomint and daughter Jaciel Zapata by phone  Freedom of Choice: Yes  Comments - Freedom of Choice: agreeable to Rehab  CM contacted family/caregiver?: Yes  Were Treatment Team discharge recommendations reviewed with patient/caregiver?: Yes  Did patient/caregiver verbalize understanding of patient care needs?: Yes  Were patient/caregiver advised of the risks associated with not following Treatment Team discharge recommendations?: Yes    Contacts  Patient Contacts: Anabell Munguia  Relationship to Patient[de-identified] Family  Contact Method: Phone  Phone Number: [phone]  Reason/Outcome: Continuity of Care, Emergency Contact, Discharge [address]         Is the patient interested in [address] at discharge?: No    DME Referral Provided  Referral made for DME?: No    Other Referral/Resources/Interventions Provided:  Interventions: Acute Rehab, Short Term Rehab  Referral Comments: acute rehab, STR    Would you like to participate in our [address] service program?  : No - Declined       Additional Comments: Patient and daughter are in agreement to referrals for acute rehab and STR via [facility] for DC needs  Referrals sent and awaiting determination  P7 Laura was made aware for further DC needs  Patient states she has had the Moderna Covid 19 vaccine plus 1 booster  CM to be available      CM reviewed d/c planning process including the following: identifying help at home, patient preference for d/c planning needs, Discharge Lounge, [facility] Meds to Bed program, availability of treatment team to discuss questions or concerns patient and/or family may have regarding understanding medications and recognizing signs and symptoms once discharged  CM also encouraged patient to follow up with all recommended appointments after discharge  Patient advised of importance for patient and family to participate in managing patient’s medical well being

## 2023-02-14 NOTE — CONSULTS
PHYSICAL MEDICINE AND REHABILITATION CONSULT NOTE  Moira Huerta 75 y o  female MRN: [id number]  Unit/Bed#: [facility] 710-01 Encounter: [id number]    Requested by (Physician/Service): Cinthya Ellis MD  Reason for Consultation:  Assessment of rehabilitation needs    Assessment:  Rehabilitation Diagnosis:   • Right MCA CVA with superimposed petechial hemorrhage and punctate acute/subactue infarct in the right cerebellum   • Right M1 occlusion   • Left hemiparesis   • Impaired mobility and self care  • Impaired cognition     Recommendations:  Rehabilitation Plan:  • Continue PT/OT (SLP) while on acute care  • The patient will likely be a candidate for acute inpatient rehabilitation once medically cleared  • Covid-19 Testing: [facility] inpatient rehabilitation units require testing within 48 hours of all potential admissions at this time  *Re-testing is NOT required for patients recovering from COVID-19 infection if isolation has been discontinued per CDC criteria  Medical Co-morbidities Plan:  · Non-ischemic cardiomyopathy  · Seronegative arthropathy on Plaquenil   · Hypertension   · CHF  · SHAR  · Chronic pain  · Bipolar disorder   · DVT ppx: Lovenox and SCD    Thank you for this consultation  Do not hesitate to contact service with further questions  LAURA Valera  PM&R    Total time spent:  30 minutes with more than 50% spent counseling/coordinating care  Counseling includes extended discussion with patient (+/- family/relevant historian)  re: history, exam, function, mood, rehabilitation management plan, medical co-morbidities plan, and disposition options  Additional time spent with thorough chart review in EMR, reviewing recent medications, labs, imaging, and management plan of primary service         History of Present Illness:  Moira Huerta is a 75 y o  female with a PMH of chronic pain, bipolar, anxiety, depression, asthma, SHAR, seronegative arthropathy, undifferentiated CTD and non-ischemic cardiomyopathy who presented to the [address] [place] [address] with left sided weakness, slurred speech and ataxia  CT of the head showed an area of loss of the gray-white matter differentiation involving the right parietotemporal region suspicious for acute ischemia  CTA head/neck confirmed a distal right MCA M1 segment with abrupt cut off and collateralized blood flow within the distal right MCA branches  She was transferred to [facility] for possible endovascular intervention  She underwent an angiogram with thrombectomy with TICI 3 revascularization  MRI showed acute to subacute ischemia within the right cerebellum, right MCA infarct with superimposed petechial hemorrhage  Heart failure was consulted for re-reduction in EF to 25-35%  She will need eventual ischemic work up with nuclear stress test and re-assessment of LVEF  She has been noted to have NSVT on telemetry which was felt to likely be due to acute catecholamine state  Eliquis is being recommended 10 days post stroke  PM&R are consulted for rehabilitation recommendations  The patient was seen in her room  She is reporting some numbness in her left foot and some burning pain in her right thigh  She currently denies any chest pain, SOB, nausea, vomiting or dizziness  Review of Systems: 10 point ROS negative except for what is noted in HPI    Function:  Prior level of function and living situation: The patient lives in a two level home with 0 TAB  She lives with her brother and son and was independent  Current level of function:  Physical Therapy: Moderate assist for transfers and ambulation   Occupational Therapy: Supervision for eating, minimal assist for grooming, LB bathing/dressing, moderate assist for UB bathing/dressing and toileting     Speech Therapy: Puree diet with thins       Physical Exam:  /89   Pulse (!) 113   Temp 97 8 °F (36 6 °C)   Resp 16   Ht 5' 3" (1 6 m)   Wt 65 8 kg (145 lb 1 oz)   SpO2 96%   BMI 25 70 kg/m²        Intake/Output Summary (Last 24 hours) at 2/14/2023 0853  Last data filed at 2/14/2023 0741  Gross per 24 hour   Intake 180 ml   Output 400 ml   Net -220 ml       Body mass index is 25 7 kg/m²  Physical Exam  Constitutional:       General: She is not in acute distress  Appearance: She is not toxic-appearing  HENT:      Head: Normocephalic and atraumatic  Right Ear: External ear normal       Left Ear: External ear normal       Nose: Nose normal       Mouth/Throat:      Mouth: Mucous membranes are moist       Pharynx: Oropharynx is clear  Pulmonary:      Effort: Pulmonary effort is normal  No respiratory distress  Abdominal:      General: There is no distension  Musculoskeletal:      Comments: LUE: SAB 3/5, EE/EF 4/5, FF 4/5  LLE: HF 3/5, KF/KE 4/5, DF/PF 4/5  RUE/RLE: 5/5 throughout    Skin:     General: Skin is warm and dry  Neurological:      Mental Status: She is alert and oriented to person, place, and time        Comments: Mild dysarthria    Psychiatric:         Mood and Affect: Mood normal           Social History:    Social History     Socioeconomic History   • Marital status:      Spouse name: Not on file   • Number of children: Not on file   • Years of education: Not on file   • Highest education level: Not on file   Occupational History   • Not on file   Tobacco Use   • Smoking status: Former   • Smokeless tobacco: Never   Vaping Use   • Vaping Use: Never used   Substance and Sexual Activity   • Alcohol use: Not Currently     Comment: quit   • Drug use: Yes     Types: Marijuana     Comment: has medical card   • Sexual activity: Not on file   Other Topics Concern   • Not on file   Social History Narrative   • Not on file     Social Determinants of Health     Financial Resource Strain: Not on file   Food Insecurity: No Food Insecurity   • Worried About Running Out of Food in the Last Year: Never true   • Ran Out of Food in the Last Year: Never true   Transportation Needs: No Transportation Needs   • Lack of Transportation (Medical): No   • Lack of Transportation (Non-Medical):  No   Physical Activity: Not on file   Stress: Not on file   Social Connections: Not on file   Intimate Partner Violence: Not on file   Housing Stability: Unknown   • Unable to Pay for Housing in the Last Year: Not on file   • Number of Places Lived in the Last Year: 1   • Unstable Housing in the Last Year: No        Family History:    Family History   Problem Relation Age of Onset   • No Known Problems Mother    • No Known Problems Father    • Arthritis Family    • Diabetes Family    • Stroke Family          Medications:     Current Facility-Administered Medications:   •  acetaminophen (TYLENOL) tablet 975 mg, 975 mg, Oral, Q6H PRN, Jian Haywood MD, 975 mg at 02/12/23 1654  •  aluminum-magnesium hydroxide-simethicone (MYLANTA) oral suspension 30 mL, 30 mL, Oral, Q4H PRN, Kemar Capone PA-C, 30 mL at 02/13/23 2322  •  atorvastatin (LIPITOR) tablet 40 mg, 40 mg, Oral, QPM, Jian Haywood MD, 40 mg at 02/13/23 1708  •  buPROPion [facility]) tablet 100 mg, 100 mg, Oral, BID, Jian Haywood MD, 100 mg at 02/13/23 0932  •  DULoxetine (CYMBALTA) delayed release capsule 60 mg, 60 mg, Oral, BID, Jian Haywood MD, 60 mg at 02/14/23 0741  •  enoxaparin (LOVENOX) subcutaneous injection 40 mg, 40 mg, Subcutaneous, Q24H [address], Jian Haywood MD, 40 mg at 02/14/23 4555  •  gabapentin (NEURONTIN) capsule 300 mg, 300 mg, Oral, BID, Jian Haywood MD, 300 mg at 02/14/23 1396  •  hydrALAZINE (APRESOLINE) injection 10 mg, 10 mg, Intravenous, Q6H PRN, Jian Haywood MD  •  hydroxychloroquine (PLAQUENIL) tablet 200 mg, 200 mg, Oral, Once per day on Mon Tue Wed Thu Fri, Jian Haywood MD, 200 mg at 02/13/23 0933  •  [START ON 2/18/2023] hydroxychloroquine (PLAQUENIL) tablet 400 mg, 400 mg, Oral, Once per day on Sun Sat, Hortencia Valdes MD  •  lamoTRIgine (LaMICtal) tablet 150 mg, 150 mg, Oral, HS, Hortencia Valdes MD, 150 mg at 02/13/23 2317  •  melatonin tablet 6 mg, 6 mg, Oral, HS, Hortencia Valdes MD, 6 mg at 02/13/23 2317  •  morphine (MS CONTIN) ER tablet 15 mg, 15 mg, Oral, Q12H [address], Hortencia Valdes MD, 15 mg at 02/14/23 0741  •  oxyCODONE (ROXICODONE) IR tablet 2 5 mg, 2 5 mg, Oral, Q6H PRN **OR** oxyCODONE (ROXICODONE) IR tablet 5 mg, 5 mg, Oral, Q6H PRN, Hortenica Valdes MD, 5 mg at 02/14/23 5485    Past Medical History:     Past Medical History:   Diagnosis Date   • Anxiety    • Asthma    • Bipolar depression ([facility] [address] )    • Chronic narcotic dependence ([facility] [address] )    • Chronic pain    • Depression    • Enterovirus heart infection    • SHAR (obstructive sleep apnea)    • Osteoarthritis    • Peripheral neuropathy    • Plantar fasciitis of right foot    • Senile osteoporosis    • Seronegative arthropathy of multiple sites ([facility] [address] )    • Undifferentiated connective tissue disease ([facility] [address] )         Past Surgical History:     Past Surgical History:   Procedure Laterality Date   • BREAST IMPLANT     • FIXATION KYPHOPLASTY LUMBAR SPINE     • HYSTERECTOMY     • INCONTINENCE SURGERY N/A    • NASAL SEPTOPLASTY W/ TURBINOPLASTY N/A    • RECTAL PROLAPSE REPAIR N/A    • REPAIR RECTOCELE     • RHINOPLASTY N/A          Allergies:      Allergies   Allergen Reactions   • Dye [Iodinated Contrast Media] Anaphylaxis     IVP dye   • Bactrim [Sulfamethoxazole-Trimethoprim] Hives   • Erythromycin Hives   • Penicillins            LABORATORY RESULTS:      Lab Results   Component Value Date    HGB 13 5 02/13/2023    HCT 41 5 02/13/2023    WBC 7 87 02/13/2023     Lab Results   Component Value Date    BUN 11 02/13/2023    K 3 8 02/13/2023     (H) 02/13/2023    GLUCOSE 87 11/17/2016    CREATININE 0 75 02/13/2023     Lab Results   Component Value Date    PROTIME 12 1 02/12/2023    INR 0 90 02/12/2023        DIAGNOSTIC STUDIES: Reviewed  CT head wo contrast    Result Date: 2/13/2023  Impression: Evolving right MCA territory infarcts with associated hyperdensity likely representing a combination of resolving retained contrast and superimposed petechial hemorrhage  Follow-up imaging is recommended to document stability/resolution  Workstation performed: [id number]     MRI brain wo contrast    Addendum Date: 2/13/2023    ADDENDUM: A 0 5 cm punctate focus of acute to subacute ischemia is noted within the right cerebellum (series 3/10)  Result Date: 2/13/2023  Impression: Acute to subacute right MCA territory infarct with superimposed petechial hemorrhage, Workstation performed: [id number]     CT chest abdomen pelvis w contrast    Result Date: 2/12/2023  Impression: No evidence of solid organ injury  No acute intra-abdominal abnormality  No free air or free fluid  Bibasilar atelectasis with no infiltrate or consolidation  No pneumothorax  Stable chronic right posterior 10th, 11th and 12th rib fractures  Stable chronic compression fractures at T11, L1 and L4  No new fracture  Workstation performed: [id number]     CT stroke alert brain    Result Date: 2/12/2023  Impression: Area of loss of the gray-white matter differentiation involving the right parietotemporal region suspicious for acute ischemia  Occlusion of the distal right MCA M1 segment is also noted on a CTA head and neck performed at same time, compatible with an acute infarct  An MRI brain could be performed for further evaluation  No acute intracranial hemorrhage  Mild chronic small vessel ischemic changes  I personally discussed this study with Priyanka Hayden on 2/12/2023 at 3:20 AM  Workstation performed: [id number]     CT recon only thoracolumbar    Result Date: 2/12/2023  Impression: No fracture or traumatic subluxation  Stable chronic compression fractures at T11, L1 and L4  Stable old right transverse process fractures at L2 and L3   Workstation performed: [id number]     CTA stroke alert (head/neck)    Result Date: 2/12/2023  Impression: Abrupt cut off of flow at the distal right MCA M1 segment compatible with an acute infarct  Collateralized blood flow is noted within the distal right MCA branches  3 mm saccular aneurysm arising from the medial aspect of the proximal right ICA supraclinoid segment  No hemodynamically significant stenosis within either common or internal carotid artery  Less than 50% stenosis by NASCET criteria   I personally discussed this study with John Sibley on 2/12/2023 at 3:20 AM  Workstation performed: [id number]

## 2023-02-15 ENCOUNTER — DOCUMENTATION (OUTPATIENT)
Dept: NEUROLOGY | Facility: CLINIC | Age: 68
End: 2023-02-15

## 2023-02-15 RX ADMIN — DULOXETINE HYDROCHLORIDE 60 MG: 60 CAPSULE, DELAYED RELEASE ORAL at 09:19

## 2023-02-15 RX ADMIN — OXYCODONE HYDROCHLORIDE 5 MG: 5 TABLET ORAL at 10:21

## 2023-02-15 RX ADMIN — BUPROPION HYDROCHLORIDE 100 MG: 100 TABLET, FILM COATED ORAL at 09:20

## 2023-02-15 RX ADMIN — GABAPENTIN 300 MG: 300 CAPSULE ORAL at 09:19

## 2023-02-15 RX ADMIN — MELATONIN 6 MG: at 21:26

## 2023-02-15 RX ADMIN — SENNOSIDES AND DOCUSATE SODIUM 2 TABLET: 8.6; 5 TABLET ORAL at 09:19

## 2023-02-15 RX ADMIN — OXYCODONE HYDROCHLORIDE 5 MG: 5 TABLET ORAL at 03:55

## 2023-02-15 RX ADMIN — BUPROPION HYDROCHLORIDE 100 MG: 100 TABLET, FILM COATED ORAL at 18:31

## 2023-02-15 RX ADMIN — GABAPENTIN 300 MG: 300 CAPSULE ORAL at 18:32

## 2023-02-15 RX ADMIN — HYDROXYCHLOROQUINE SULFATE 200 MG: 200 TABLET ORAL at 13:08

## 2023-02-15 RX ADMIN — ENOXAPARIN SODIUM 40 MG: 40 INJECTION SUBCUTANEOUS at 09:19

## 2023-02-15 RX ADMIN — MORPHINE SULFATE 15 MG: 15 TABLET, FILM COATED, EXTENDED RELEASE ORAL at 21:29

## 2023-02-15 RX ADMIN — LAMOTRIGINE 150 MG: 100 TABLET ORAL at 21:27

## 2023-02-15 RX ADMIN — ATORVASTATIN CALCIUM 40 MG: 40 TABLET, FILM COATED ORAL at 18:31

## 2023-02-15 RX ADMIN — DULOXETINE HYDROCHLORIDE 60 MG: 60 CAPSULE, DELAYED RELEASE ORAL at 18:32

## 2023-02-15 RX ADMIN — MORPHINE SULFATE 15 MG: 15 TABLET, FILM COATED, EXTENDED RELEASE ORAL at 09:19

## 2023-02-15 RX ADMIN — CARVEDILOL 6.25 MG: 6.25 TABLET, FILM COATED ORAL at 09:19

## 2023-02-15 RX ADMIN — CARVEDILOL 6.25 MG: 6.25 TABLET, FILM COATED ORAL at 16:39

## 2023-02-15 RX ADMIN — OXYCODONE HYDROCHLORIDE 5 MG: 5 TABLET ORAL at 16:37

## 2023-02-15 NOTE — RESTORATIVE TECHNICIAN NOTE
Restorative Technician Note      Patient Name: Giovanna Estrada     Note Type: Mobility  Patient Position Upon Consult: Supine  Activity Performed: Ambulated; ILUPUMA; Stood  Assistive Device: Roller walker  Education Provided: Yes  Patient Position at [employer] of Consult: Bedside chair;  All needs within reach; Bed/Chair alarm activated      Leda BAPTISTE, Restorative Technician, [employer]

## 2023-02-15 NOTE — PLAN OF CARE
Problem: PHYSICAL THERAPY ADULT  Goal: Performs mobility at highest level of function for planned discharge setting  See evaluation for individualized goals  Description: Treatment/Interventions: OT, Spoke to case management, Spoke to nursing, Gait training, Bed mobility, Patient/family training, Endurance training, LE strengthening/ROM, Functional transfer training          See flowsheet documentation for full assessment, interventions and recommendations  Outcome: Progressing  Note: Prognosis: Good  Problem List: Decreased strength, Decreased endurance, Impaired balance, Decreased mobility, Decreased coordination, Decreased cognition, Impaired judgement, Decreased safety awareness  Assessment: Pt seen for session for setup, standing and sitting time to help clean pt, transfers, gait w/ rest time, reposiitoning  Pt cooperative w/ sesison  Needs frequent cues to keep L UE on RW, decrease L sided lean, and properly and safely utilize RW  Note improved gait tolerance, but also note multiple LOB throughout  continue to reocmmend rehab at d/c  Barriers to Discharge: Decreased caregiver support, Inaccessible home environment     PT Discharge Recommendation: Post acute rehabilitation services    See flowsheet documentation for full assessment

## 2023-02-15 NOTE — PROGRESS NOTES
Met with patient at bedside in room 710  Introduced my role  Provided stroke education including the stroke education booklet  Reinforced the importance of the incentive spirometry  Offered to schedule stroke hospital follow up appointment  Patient requested for this RN to reach out to her daughter, Pao Pritchard, to schedule  Will do  Patient is agreeable to outreach phone calls after discharge  She denies any immediate needs  She was appreciative  Called Pao Pritchard  Scheduled stroke hospital follow up appointment  It was recommended for the patient to follow up in 6 weeks with [facility] attending or AP  Scheduled appointment for 3/29/23  Provided date/time/location/provider  She denies any questions or concerns  She was appreciative

## 2023-02-15 NOTE — PROGRESS NOTES
Cardiology Progress note  Unit/Bed#: [facility] 710-01 Encounter: [id number]        Giovanna Estrada 75 y o  female [id number]  Hospital Stay Days: 3    Assessment and Plan      Current Problem List   Principal Problem:    Stroke [facility])  Active Problems:    Chronic pain syndrome    Ambulatory dysfunction    Seronegative arthropathy of multiple sites ([facility] )    Bipolar depression ([facility] )    Cardiomyopathy ([place] [address] )    HTN (hypertension)    HFrEF (heart failure with reduced ejection fraction) ([facility])    Assessment/Plan:       1   Cardiomyopathy - Patient with previous history of NICM per notes - although we have no records of actual even but rather just his caridologist at [facility] notes  Reported history of EF 10-15% in early 2000s was placed on GDMT with improvement of EF  Had a EF of 50% in 2019 then improved with restarting coreg to 60% now with reduction in EF to 25-30% in the setting of acute stroke  Etiologies include ICM vs  Stress induced CM as most likely possible etiologies - although no definite exclusion of other etiologies at this time  ? Restart Coreg - at 6 25 mg BID now  ? Will need eventual ischemic work up  ? I and O  ? Daily weights  ? Will hold off on further workup for other non ischemic etiologies pending ischemic work up and EF trend on GDMT  ? Monitor on tele  ? Does not appear to be low output on exam - tachycardia likely secondary to stroke  2   Valvular abnormalities - noted to have mild to moderate AI mild MR  ?  Cont  To follow up outpatient  3  Seronegative arthropathy   ? On plaquenil,   4  NSVT  ·  Noted on monitor - likely secondary to acute catecholamine state - non sustained no symptoms  Cont  To monitor  Eventual ischemic work up  Subjective     Patient seen and examined  Sinus tachycardia aroudn 100 BPM on tele  No complaints this morning  On Coreg 6 25 BID  Neurology saw patient yesterday - CT head findings noted  I and on are completely inaccurate  Weight down one pound from yesterday at 144 today  Objective     Vitals: Temp (24hrs), Av 7 °F (36 5 °C), Min:97 7 °F (36 5 °C), Max:97 7 °F (36 5 °C)  Current: Temperature: 97 7 °F (36 5 °C)  Patient Vitals for the past 24 hrs:   BP Temp Pulse SpO2 Weight   02/15/23 0802 133/86 97 7 °F (36 5 °C) (!) 108 96 % --   02/15/23 0533 -- -- -- -- 65 6 kg (144 lb 10 oz)   23 -- -- (!) 114 -- --   23 142/91 -- -- -- --    Body mass index is 25 62 kg/m²  Physical Exam:  Physical Exam  Constitutional:       Appearance: Normal appearance  Cardiovascular:      Rate and Rhythm: Normal rate and regular rhythm  Pulmonary:      Effort: Pulmonary effort is normal  No respiratory distress  Breath sounds: No wheezing  Abdominal:      General: Abdomen is flat  There is no distension  Musculoskeletal:      Right lower leg: No edema  Left lower leg: No edema  Skin:     General: Skin is warm  Neurological:      Mental Status: She is alert           Invasive Devices     Peripheral Intravenous Line  Duration           Peripheral IV 23 Left Forearm 2 days    Peripheral IV 23 Left Antecubital 2 days                    Labs:   Results from last 7 days   Lab Units 23  0512 23  0302   WBC Thousand/uL 7 87 6 86   HEMOGLOBIN g/dL 13 5 14 5   HEMATOCRIT % 41 5 44 5   PLATELETS Thousands/uL 197 216   NEUTROS PCT % 80*  --    MONOS PCT % 8  --       Results from last 7 days   Lab Units 23  0512 23  0302   SODIUM mmol/L 143 141   POTASSIUM mmol/L 3 8 3 9   CHLORIDE mmol/L 117* 109*   CO2 mmol/L 21 28   BUN mg/dL 11 13   CREATININE mg/dL 0 75 0 94   CALCIUM mg/dL 9 6 10 8*   MAGNESIUM mg/dL 1 7  --    PHOSPHORUS mg/dL 3 2  --    INR   --  0 90   PTT seconds  --  24   EGFR ml/min/1 73sq m 82 62     Results from last 7 days   Lab Units 23  0302   INR  0 90   PTT seconds 24             No results found for: PHART, DVX0FMX, PO2ART, MFJ1RQC, F9WDPORN, BEART, SOURCE  No components found for: HIV1X2  No results found for: HAV, HEPAIGM, HEPBIGM, HEPBCAB, HBEAG, HEPCAB  No results found for: SPEP, UPEP   Lab Results   Component Value Date    HGBA1C 5 0 02/13/2023    HGBA1C 5 2 09/13/2022     No results found for: CHOL   Lab Results   Component Value Date    HDL 81 02/13/2023      Lab Results   Component Value Date    LDLCALC 84 02/13/2023      Lab Results   Component Value Date    TRIG 81 02/13/2023     No components found for: PROCAL      Micro:      Urinalysis:  Lab Results   Component Value Date    BDZUR Negative 06/25/2021    COCAINEUR Negative 06/25/2021    OPIATEUR Positive (A) 06/25/2021    PCPUR Negative 06/25/2021    THCUR Positive (A) 06/25/2021    ETOH 3 04/22/2022    ETOH <3 06/25/2021    ACTMNPHEN <2 (L) 48/14/8622    SALICYLATE <3 (L) 62/68/1132          Invalid input(s): URIBILINOGEN        Intake and Outputs:  I/O       02/12 0701  02/13 0700 02/13 0701  02/14 0700 02/14 0701  02/15 0700    P  O  200  180    I V  (mL/kg) 1989 2 (30 4)      Total Intake(mL/kg) 2189 2 (33 5)  180 (2 7)    Urine (mL/kg/hr) 500 (0 3) 400 (0 3)     Stool  0     Total Output 500 400     Net +1689 2 -400 +180           Unmeasured Urine Occurrence 1 x 2 x     Unmeasured Stool Occurrence  1 x         Nutrition:  Diet Dysphagia/Modified Consistency; Dysphagia 2-Mechanical Soft; Thin Liquid  Radiology Results:   CT head wo contrast   Final Result by Kunal Blanchard MD (02/14 2659)      Evolution of large right MCA territory infarct with decreased contrast staining, persistent small acute parenchymal hematoma in right basal ganglia (worse in right putamen), and persistent petechial cortical hemorrhage involving right insula, right    posterior temporal, and right parietal lobes status post right MCA mechanical thrombectomy  New 0 3 cm leftward midline shift with persistent partial effacement of right lateral ventricle        Tiny acute infarct in right cerebellum is better evaluated on MRI brain 2/12/2023  The study was marked in [facility] for immediate notification  Workstation performed: [id number]         CT head wo contrast   Final Result by Pippa Oro MD (02/13 0750)      Evolving right MCA territory infarcts with associated hyperdensity likely representing a combination of resolving retained contrast and superimposed petechial hemorrhage  Follow-up imaging is recommended to document stability/resolution  Workstation performed: [id number]         MRI brain wo contrast   Final Result by Pippa Oro MD (02/13 0731)   Addendum (preliminary) 1 of 1 by Pippa Oro MD (02/13 0731)   ADDENDUM:      A 0 5 cm punctate focus of acute to subacute ischemia is noted within the    right cerebellum (series 3/10)        Final      Acute to subacute right MCA territory infarct with superimposed petechial hemorrhage,       Workstation performed: [id number]         IR stroke alert    (Results Pending)   CT head wo contrast    (Results Pending)     Scheduled Medications:  atorvastatin, 40 mg, QPM  buPROPion, 100 mg, BID  carvedilol, 6 25 mg, BID With Meals  DULoxetine, 60 mg, BID  enoxaparin, 40 mg, Q24H KHARI  gabapentin, 300 mg, BID  hydroxychloroquine, 200 mg, Once per day on Mon Tue Wed Thu Fri  [START ON 2/18/2023] hydroxychloroquine, 400 mg, Once per day on Sun Sat  lamoTRIgine, 150 mg, HS  melatonin, 6 mg, HS  morphine, 15 mg, Q12H [address]  polyethylene glycol, 17 g, Daily  senna-docusate sodium, 2 tablet, BID      PRN MEDS:  acetaminophen, 975 mg, Q6H PRN  aluminum-magnesium hydroxide-simethicone, 30 mL, Q4H PRN  hydrALAZINE, 10 mg, Q6H PRN  oxyCODONE, 2 5 mg, Q6H PRN   Or  oxyCODONE, 5 mg, Q6H PRN      Last 24 Hour Meds: :   Medication Administration - last 24 hours from 02/14/2023 1004 to 02/15/2023 1004       Date/Time Order Dose Route Action Action by     02/14/2023 1736 EST atorvastatin (LIPITOR) tablet 40 mg 40 mg Oral Given Prakash Gauthier RN 02/15/2023 0919 EST enoxaparin (LOVENOX) subcutaneous injection 40 mg 40 mg Subcutaneous Given [facility], RN     02/15/2023 0920 EST buPROPion [facility]) tablet 100 mg 100 mg Oral Given [facility], RN     02/14/2023 1736 EST buPROPion (WELLBUTRIN) tablet 100 mg 100 mg Oral Given [facility], RN     02/14/2023 1156 EST buPROPion (WELLBUTRIN) tablet 100 mg 100 mg Oral Given Cholo Art, RN     02/15/2023 0919 EST DULoxetine (CYMBALTA) delayed release capsule 60 mg 60 mg Oral Given [facility], RN     02/14/2023 1736 EST DULoxetine (CYMBALTA) delayed release capsule 60 mg 60 mg Oral Given [facility], RN     02/15/2023 0919 EST gabapentin (NEURONTIN) capsule 300 mg 300 mg Oral Given [facility], RN     02/14/2023 1737 EST gabapentin (NEURONTIN) capsule 300 mg 300 mg Oral Given [facility], RN     02/14/2023 2303 EST lamoTRIgine (LaMICtal) tablet 150 mg 150 mg Oral Given [facility], RN     02/15/2023 0355 EST oxyCODONE (ROXICODONE) IR tablet 2 5 mg -- Oral See Alternative Julito Namrata, RN     02/14/2023 2024 EST oxyCODONE (ROXICODONE) IR tablet 2 5 mg -- Oral See Alternative [facility], RN     02/14/2023 1404 EST oxyCODONE (ROXICODONE) IR tablet 2 5 mg -- Oral See Alternative [facility], RN     02/15/2023 0355 EST oxyCODONE (ROXICODONE) IR tablet 5 mg 5 mg Oral Given [facility], RN     02/14/2023 2024 EST oxyCODONE (ROXICODONE) IR tablet 5 mg 5 mg Oral Given [facility], RN     02/14/2023 1404 EST oxyCODONE (ROXICODONE) IR tablet 5 mg 5 mg Oral Given [facility], RN     02/14/2023 1156 EST hydroxychloroquine (PLAQUENIL) tablet 200 mg 200 mg Oral Given Holland Art, RN     02/14/2023 2303 EST melatonin tablet 6 mg 6 mg Oral Given Julito Ott RN     02/15/2023 0919 EST morphine (MS CONTIN) ER tablet 15 mg 15 mg Oral Given Rosana Kamara RN     02/14/2023 2303 EST morphine (MS CONTIN) ER tablet 15 mg 15 mg Oral Given Julito Ott RN     02/14/2023 1538 EST aluminum-magnesium hydroxide-simethicone (MYLANTA) oral suspension 30 mL 30 mL Oral Given Elyse Villa, RN     02/15/2023 0919 EST carvedilol (COREG) tablet 6 25 mg 6 25 mg Oral Given Vallorie Daisy, RN     02/14/2023 1538 EST carvedilol (COREG) tablet 6 25 mg 6 25 mg Oral Given Vallorie Daisy, RN     02/15/2023 0919 EST polyethylene glycol (MIRALAX) packet 17 g 17 g Oral Refused Vallorie Daisy, RN     02/14/2023 1539 EST polyethylene glycol (MIRALAX) packet 17 g 17 g Oral Given Vallorie Daisy, RN     02/15/2023 0919 EST senna-docusate sodium (SENOKOT S) 8 6-50 mg per tablet 2 tablet 2 tablet Oral Given Earnestinelorie Daisy, RN     02/14/2023 1538 EST senna-docusate sodium (SENOKOT S) 8 6-50 mg per tablet 2 tablet 2 tablet Oral Given Valrohit Villa, RN          PLEASE NOTE:  This encounter was completed utilizing the [employer] Direct Speech Voice Recognition Software  Grammatical errors, random word insertions, pronoun errors and incomplete sentences are occasional consequences of the system due to software limitations, ambient noise and hardware issues  These may be missed by proof reading prior to affixing electronic signature  Any questions or concerns about the content, text or information contained within the body of this dictation should be directly addressed to the physician for clarification  Please do not hesitate to call me directly if you have any any questions or concerns

## 2023-02-15 NOTE — SPEECH THERAPY NOTE
Speech Language/Pathology    Speech/Language Pathology Progress Note    Patient Name: Alayna Rubin  CSACR'I Date: 2/15/2023     Problem List  Principal Problem:    Stroke [facility])  Active Problems:    Chronic pain syndrome    Ambulatory dysfunction    Seronegative arthropathy of multiple sites ([facility] )    Bipolar depression ([facility] )    Cardiomyopathy ([facility] )    HTN (hypertension)    HFrEF (heart failure with reduced ejection fraction) ([place] [address] )       Past Medical History  Past Medical History:   Diagnosis Date   • Anxiety    • Asthma    • Bipolar depression ([facility] )    • Chronic narcotic dependence ([place] [address] )    • Chronic pain    • Depression    • Enterovirus heart infection    • SHAR (obstructive sleep apnea)    • Osteoarthritis    • Peripheral neuropathy    • Plantar fasciitis of right foot    • Senile osteoporosis    • Seronegative arthropathy of multiple sites ([place] [address] )    • Undifferentiated connective tissue disease ([place] [address] )         Past Surgical History  Past Surgical History:   Procedure Laterality Date   • BREAST IMPLANT     • FIXATION KYPHOPLASTY LUMBAR SPINE     • HYSTERECTOMY     • INCONTINENCE SURGERY N/A    • NASAL SEPTOPLASTY W/ TURBINOPLASTY N/A    • RECTAL PROLAPSE REPAIR N/A    • REPAIR RECTOCELE     • RHINOPLASTY N/A          Subjective:  "The food is much better" Patient awake and alert  OOB in recliner  Objective: The patient is seen for dysphagia therapy at lunch meal  She is able to feed herself, with assistance for tray set up  The patient appears to have decreased left hand control/coordination  RN aware  The patient is assessed with puree eggs and diced peaches  Bite size can be large  The patient has intermittent left side anterior leakage of puree solids  She is able to independently clear today  Mastication time is prolonged with peaches and patient continues with decreased oral control   Left side buccal pocketing observed, but patient is able to clear with lingual sweep and fully masticate and clear oral cavity  She takes thin liquids via straw  Initially, she has a cough response, but tolerates an additional 3 sips without overt s/s aspiration  Intake is good at lunch meal      Assessment:  The patient tolerated puree and mechanical soft solids well  She is at risk for aspiration due to decreased oral control and history of aspiration  Plan/Recommendations:  Continue mechanical soft diet and thin liquids  Continue ST to further assess  May consider VBS if concerns for aspiration continue

## 2023-02-15 NOTE — PROGRESS NOTES
PHYSICAL MEDICINE AND REHABILITATION   PREADMISSION ASSESSMENT     Projected [facility] Diagnoses:  Impairment of mobility, safety, Activities of Daily Living (ADLs), and cognitive/communication skills due to Stroke:  01 1  Left Body Involvement (Right Brain)     Etiologic: ***  Date of Onset: 2/12/2023   Date of surgery: ***    PATIENT INFORMATION  Name: Nirmala Hastings Phone #: [phone] (home)   Address: [address] [place] [address]  YOB: 1955 Age: 75 y o  SS#   Marital Status:   Ethnicity:   Employment Status: retired  Extended Emergency Contact Information  Primary Emergency Contact: Juan Jose of [address] [place] [address] Phone: [phone]  Relation: Daughter  Secondary Emergency Contact: Roberto Marmolejo  Mobile Phone: [phone]  Relation: Brother  Advance Directive: Level 1 - Full Code (No ACP docs)    INSURANCE/COVERAGE:     Primary Payor: MEDICARE / Plan: MEDICARE A AND B / Product Type: Medicare A & B Fee for Service /   Secondary Payer:<NONE>   Payer Contact:  Payer Contact:   Contact Phone:  Contact Phone:       MEDICARE #: Medicare Days: ***  Medical Record #: [id number]    REFERRAL SOURCE:   Referring provider: Linnette Soto MD  Referring facility: [address] [place] [address]  [facility]  PCP: Rosalba Lowry DO PCP phone number: [phone]    MEDICAL INFORMATION  HPI: ***      Past Medical History:   Past Surgical History:    Allergies:     Past Medical History:   Diagnosis Date   • Anxiety    • Asthma    • Bipolar depression ([facility] )    • Chronic narcotic dependence ([facility] )    • Chronic pain    • Depression    • Enterovirus heart infection    • SHAR (obstructive sleep apnea)    • Osteoarthritis    • Peripheral neuropathy    • Plantar fasciitis of right foot    • Senile osteoporosis    • Seronegative arthropathy of multiple sites [facility])    • Undifferentiated connective tissue disease ([facility] ) Past Surgical History:   Procedure Laterality Date   • BREAST IMPLANT     • FIXATION KYPHOPLASTY LUMBAR SPINE     • HYSTERECTOMY     • INCONTINENCE SURGERY N/A    • NASAL SEPTOPLASTY W/ TURBINOPLASTY N/A    • RECTAL PROLAPSE REPAIR N/A    • REPAIR RECTOCELE     • RHINOPLASTY N/A      Allergies   Allergen Reactions   • Dye [Iodinated Contrast Media] Anaphylaxis     IVP dye   • Bactrim [Sulfamethoxazole-Trimethoprim] Hives   • Erythromycin Hives   • Penicillins          Medical/functional conditions requiring inpatient rehabilitation: ***    Risk for medical/clinical complications: ***    Comorbidities/Surgeries in the last 100 days: {ARC Comorbidities:49577}    CURRENT VITAL SIGNS:   Temp:  [97 7 °F (36 5 °C)] 97 7 °F (36 5 °C)  HR:  [108-114] 108  BP: (133-142)/(86-91) 133/86   Intake/Output Summary (Last 24 hours) at 2/15/2023 1017  Last data filed at 2/15/2023 0802  Gross per 24 hour   Intake 360 ml   Output 100 ml   Net 260 ml        LABORATORY RESULTS:      Lab Results   Component Value Date    HGB 13 5 02/13/2023    HCT 41 5 02/13/2023    WBC 7 87 02/13/2023     Lab Results   Component Value Date    BUN 11 02/13/2023    K 3 8 02/13/2023     (H) 02/13/2023    GLUCOSE 87 11/17/2016    CREATININE 0 75 02/13/2023     Lab Results   Component Value Date    PROTIME 12 1 02/12/2023    INR 0 90 02/12/2023        DIAGNOSTIC STUDIES:  CT head wo contrast    Result Date: 2/14/2023  Impression: Evolution of large right MCA territory infarct with decreased contrast staining, persistent small acute parenchymal hematoma in right basal ganglia (worse in right putamen), and persistent petechial cortical hemorrhage involving right insula, right posterior temporal, and right parietal lobes status post right MCA mechanical thrombectomy  New 0 3 cm leftward midline shift with persistent partial effacement of right lateral ventricle  Tiny acute infarct in right cerebellum is better evaluated on MRI brain 2/12/2023   The study was marked in [facility] for immediate notification  Workstation performed: [id number]     CT head wo contrast    Result Date: 2/13/2023  Impression: Evolving right MCA territory infarcts with associated hyperdensity likely representing a combination of resolving retained contrast and superimposed petechial hemorrhage  Follow-up imaging is recommended to document stability/resolution  Workstation performed: [id number]     MRI brain wo contrast    Addendum Date: 2/13/2023    ADDENDUM: A 0 5 cm punctate focus of acute to subacute ischemia is noted within the right cerebellum (series 3/10)  Result Date: 2/13/2023  Impression: Acute to subacute right MCA territory infarct with superimposed petechial hemorrhage, Workstation performed: [id number]     CT chest abdomen pelvis w contrast    Result Date: 2/12/2023  Impression: No evidence of solid organ injury  No acute intra-abdominal abnormality  No free air or free fluid  Bibasilar atelectasis with no infiltrate or consolidation  No pneumothorax  Stable chronic right posterior 10th, 11th and 12th rib fractures  Stable chronic compression fractures at T11, L1 and L4  No new fracture  Workstation performed: [id number]     CT stroke alert brain    Result Date: 2/12/2023  Impression: Area of loss of the gray-white matter differentiation involving the right parietotemporal region suspicious for acute ischemia  Occlusion of the distal right MCA M1 segment is also noted on a CTA head and neck performed at same time, compatible with an acute infarct  An MRI brain could be performed for further evaluation  No acute intracranial hemorrhage  Mild chronic small vessel ischemic changes  I personally discussed this study with Tabby Rivas on 2/12/2023 at 3:20 AM  Workstation performed: [id number]     CT recon only thoracolumbar    Result Date: 2/12/2023  Impression: No fracture or traumatic subluxation  Stable chronic compression fractures at T11, L1 and L4   Stable old right transverse process fractures at L2 and L3  Workstation performed: [id number]     CTA stroke alert (head/neck)    Result Date: 2/12/2023  Impression: Abrupt cut off of flow at the distal right MCA M1 segment compatible with an acute infarct  Collateralized blood flow is noted within the distal right MCA branches  3 mm saccular aneurysm arising from the medial aspect of the proximal right ICA supraclinoid segment  No hemodynamically significant stenosis within either common or internal carotid artery  Less than 50% stenosis by NASCET criteria   I personally discussed this study with Brittany Holland on 2/12/2023 at 3:20 AM  Workstation performed: [id number]       PRECAUTIONS/SPECIAL NEEDS:  {ARC PRE Precautions:14558}    MEDICATIONS:     Current Facility-Administered Medications:   •  acetaminophen (TYLENOL) tablet 975 mg, 975 mg, Oral, Q6H PRN, Nuno Hernandez MD, 975 mg at 02/12/23 1654  •  aluminum-magnesium hydroxide-simethicone (MYLANTA) oral suspension 30 mL, 30 mL, Oral, Q4H PRN, Pamela Cota PA-C, 30 mL at 02/14/23 1538  •  atorvastatin (LIPITOR) tablet 40 mg, 40 mg, Oral, QPM, Nuno Hernandez MD, 40 mg at 02/14/23 1736  •  buPROPion [facility]) tablet 100 mg, 100 mg, Oral, BID, Nuno Hernandez MD, 100 mg at 02/15/23 0920  •  carvedilol (COREG) tablet 6 25 mg, 6 25 mg, Oral, BID With Meals, Lashawn Alvarez DO, 6 25 mg at 02/15/23 1737  •  DULoxetine (CYMBALTA) delayed release capsule 60 mg, 60 mg, Oral, BID, Nuno Hernandez MD, 60 mg at 02/15/23 0919  •  enoxaparin (LOVENOX) subcutaneous injection 40 mg, 40 mg, Subcutaneous, Q24H [address], Nuno Hernandez MD, 40 mg at 02/15/23 1803  •  gabapentin (NEURONTIN) capsule 300 mg, 300 mg, Oral, BID, Nuno Hernandez MD, 300 mg at 02/15/23 8976  •  hydrALAZINE (APRESOLINE) injection 10 mg, 10 mg, Intravenous, Q6H PRN, Nuno Hernandez MD  •  hydroxychloroquine (PLAQUENIL) tablet 200 mg, 200 mg, Oral, Once per day on , Zachary Rosado MD, 200 mg at 23 1156  •  [START ON 2023] hydroxychloroquine (PLAQUENIL) tablet 400 mg, 400 mg, Oral, Once per day on Sun Sat, Zachary Rosado MD  •  lamoTRIgine (LaMICtal) tablet 150 mg, 150 mg, Oral, HS, Zachary Rosado MD, 150 mg at 23 2303  •  melatonin tablet 6 mg, 6 mg, Oral, HS, Zachary Rosado MD, 6 mg at 23 2303  •  morphine (MS CONTIN) ER tablet 15 mg, 15 mg, Oral, Q12H [address], Zachary Rosado MD, 15 mg at 02/15/23 3223  •  oxyCODONE (ROXICODONE) IR tablet 2 5 mg, 2 5 mg, Oral, Q6H PRN **OR** oxyCODONE (ROXICODONE) IR tablet 5 mg, 5 mg, Oral, Q6H PRN, Zachary Rosado MD, 5 mg at 02/15/23 0355  •  polyethylene glycol (MIRALAX) packet 17 g, 17 g, Oral, Daily, Kelvin Mei MD, 17 g at 23 1539  •  senna-docusate sodium (SENOKOT S) 8 6-50 mg per tablet 2 tablet, 2 tablet, Oral, BID, Kelvin Mei MD, 2 tablet at 02/15/23 0919    SKIN INTEGRITY:   {Exam; skin:93296::"no rashes","no erythema","no peripheral edema"}    PRIOR LEVEL OF FUNCTION:  [address]}  [facility] Medicine is {ARC Marital Status:02380} and {places; lives with:5711::"lives with their family"}  {ARC PRE PRIOR FUNCTION:96968}    FALLS IN THE LAST 6 MONTHS: ***    HOME ENVIRONMENT:  The living area: {Rehab home environment / accessibility:06193}  There are {STEPS:} to enter the home  The patient {ANUP WILL_WILL NOT:32211} have 24 hour {ARC Supervision/physical assistance:62121::"supervision"} available upon discharge      PREVIOUS DME:  Equipment in home (previous DME): {ARC Home Equipment:27400}    FUNCTIONAL STATUS:***  Physical Therapy Occupational Therapy Speech Therapy          CARE SCORES:  Self Care:  Eating: {[facility] Pre Admission Screenin}  Oral hygiene: {ARC Pre Admission Screenin}  Toilet hygiene: {ARC Pre Admission Screenin}  Shower/bathing self: {ARC Pre Admission Screenin}  Upper body dressing: {ARC Pre Admission Screenin}  Lower body dressing: {ARC Pre Admission Screenin}  Putting on/taking off footwear: {ARC Pre Admission Screenin}  Transfers:  Roll left and right: {ARC Pre Admission Screenin}  Sit to lying: {ARC Pre Admission Screenin}  Lying to sitting on side of bed: {ARC Pre Admission Screenin}  Sit to stand: {ARC Pre Admission Screenin}  Chair/bed to chair transfer: {ARC Pre Admission Screenin}  Toilet transfer: {ARC Pre Admission Screenin}  Mobility:  Walk 10 ft: {ARC Pre Admission Screenin}  Walk 50 ft with two turns: {ARC Pre Admission Screenin}  Walk 150ft: {ARC Pre Admission Screenin}    CURRENT GAP IN FUNCTION  Prior to Admission: {Functional Status:34931:::1}    Estimated length of stay: {ARC Length Of W[facility]LI:83277}    Anticipated Post-Discharge Disposition/Treatment  Disposition: {ARC Disposition:37614}  Outpatient Services: {[facility] Post-Discharge WJLOO:06446}    BARRIERS TO DISCHARGE  {ARC Barrier:68708}    INTERVENTIONS FOR DISCHARGE  {ARC Intervention:05953}    REQUIRED THERAPY:  {ARC Required Therapy:37302}    REQUIRED FUNCTIONAL AND MEDICAL MANAGEMENT FOR INPATIENT REHABILITATION:  {ARC Current Medical Problems:98778}    RECOMMENDED LEVEL OF CARE: ***

## 2023-02-15 NOTE — PLAN OF CARE
Problem: SAFETY ADULT  Goal: Patient will remain free of falls  Description: INTERVENTIONS:  - Educate patient/family on patient safety including physical limitations  - Instruct patient to call for assistance with activity   - Consult OT/PT to assist with strengthening/mobility   - Keep Call bell within reach  - Keep bed low and locked with side rails adjusted as appropriate  - Keep care items and personal belongings within reach  - Initiate and maintain comfort rounds  - Make Fall Risk Sign visible to staff  - Offer Toileting every 2 Hours, in advance of need  - Initiate/Maintain bedalarm  - Obtain necessary fall risk management equipment: socks,walker  - Apply yellow socks and bracelet for high fall risk patients  - Consider moving patient to room near nurses station  Outcome: Progressing  Goal: Maintain or return to baseline ADL function  Description: INTERVENTIONS:  -  Assess patient's ability to carry out ADLs; assess patient's baseline for ADL function and identify physical deficits which impact ability to perform ADLs (bathing, care of mouth/teeth, toileting, grooming, dressing, etc )  - Assess/evaluate cause of self-care deficits   - Assess range of motion  - Assess patient's mobility; develop plan if impaired  - Assess patient's need for assistive devices and provide as appropriate  - Encourage maximum independence but intervene and supervise when necessary  - Involve family in performance of ADLs  - Assess for home care needs following discharge   - Consider OT consult to assist with ADL evaluation and planning for discharge  - Provide patient education as appropriate  Outcome: Progressing  Goal: Maintains/Returns to pre admission functional level  Description: INTERVENTIONS:  - Perform BMAT or MOVE assessment daily    - Set and communicate daily mobility goal to care team and patient/family/caregiver     - Collaborate with rehabilitation services on mobility goals if consulted  - Perform Range of Motion 3 times a day  - Reposition patient every 3 hours    - Dangle patient 3 times a day  - Stand patient 3 times a day  - Ambulate patient 3 times a day  - Out of bed to chair 3 times a day   - Out of bed for meals 3 times a day  - Out of bed for toileting  - Record patient progress and toleration of activity level   Outcome: Progressing

## 2023-02-15 NOTE — ASSESSMENT & PLAN NOTE
2D echo reveals EF 25 to 30%  Continue carvedilol  Cardiology plans for outpatient work-up noted  Discussed with cardiology

## 2023-02-15 NOTE — PHYSICAL THERAPY NOTE
Physical therapy Treatment Note       02/15/23 0920   PT Last Visit   PT Visit Date 02/15/23   Note Type   Note Type Treatment   Pain Assessment   Pain Assessment Tool 0-10   Pain Score No Pain   Restrictions/Precautions   Weight Bearing Precautions Per Order No   Other Precautions Cognitive; Impulsive;Multiple lines;Telemetry; Fall Risk;Pain   General   Chart Reviewed Yes   Family/Caregiver Present No   Cognition   Overall Cognitive Status Impaired   Arousal/Participation Responsive   Attention Attends with cues to redirect   Orientation Level Oriented X4   Memory Unable to assess   Following Commands Follows one step commands without difficulty   Subjective   Subjective states she feels OK  some fatigue  willing to participate   Transfers   Sit to Stand 3  Moderate assistance   Additional items Assist x 1; Increased time required; Impulsive;Verbal cues   Stand to Sit 3  Moderate assistance   Additional items Assist x 1; Increased time required; Impulsive;Verbal cues   Additional Comments 1st 15 min of session spent assiting pt w/ changing gown, linens due to urinary incontinence, w/ several standing trials  Ambulation/Elevation   Gait pattern   (slow, short step length, ataxia, L sided lean, increased RW advancement, decreased L UE use on RW)   Gait Assistance 3  Moderate assist   Additional items Assist x 1   Assistive Device Rolling walker;1 handed walker; Axillary crutches  (verbal and tactile cues for L UE placement and maintaining on RW)   Distance 35'x2, needs frequent standing rests to cue for step length, RW steering, keeping L LE on RW   increased time spent to reposiiton to comfort post session   Balance   Static Sitting Normal   Dynamic Sitting Good   Static Standing Poor +   Dynamic Standing Poor   Ambulatory Poor   Endurance Deficit   Endurance Deficit Yes   Endurance Deficit Description weakness, fatigue, cog   Activity Tolerance   Activity Tolerance Patient limited by fatigue;Treatment limited secondary to medical complications (Comment)   Nurse Made Aware yes   Assessment   Prognosis Good   Problem List Decreased strength;Decreased endurance; Impaired balance;Decreased mobility; Decreased coordination;Decreased cognition; Impaired judgement;Decreased safety awareness   Assessment Pt seen for session for setup, standing and sitting time to help clean pt, transfers, gait w/ rest time, reposiitoning  Pt cooperative w/ beronicaison  Needs frequent cues to keep L UE on RW, decrease L sided lean, and properly and safely utilize RW  Note improved gait tolerance, but also note multiple LOB throughout  continue to reocmmend rehab at d/c   Goals   Patient Goals to get better   STG Expiration Date 02/25/23   PT Treatment Day 2   Plan   Treatment/Interventions Functional transfer training;LE strengthening/ROM; Therapeutic exercise; Endurance training;Patient/family training;Gait training   Progress Progressing toward goals   PT Frequency 3-5x/wk   Recommendation   PT Discharge Recommendation Post acute rehabilitation services   AM-PAC Basic Mobility Inpatient   Turning in Flat Bed Without Bedrails 3   Lying on Back to Sitting on Edge of Flat Bed Without Bedrails 2   Moving Bed to Chair 2   Standing Up From Chair Using Arms 2   Walk in Room 2   Climb 3-5 Stairs With Railing 1   Basic Mobility Inpatient Raw Score 12   Basic Mobility Standardized Score 32 23   Highest Level Of Mobility   JH-HLM Goal 4: Move to chair/commode   JH-HLM Achieved 7: Walk 25 feet or more     Sin Davis PT, DPT CSRS

## 2023-02-15 NOTE — ARC ADMISSION
ARC admissions team received referral on patient for possible ARC placement  Upon review of patient’s case with [facility] physician, patient has been pre-approved for ARC at this time pending medical stability and bed availability  CM has been updated

## 2023-02-15 NOTE — ASSESSMENT & PLAN NOTE
Patient with history hypertension presented with left-sided weakness slurred speech and transferred from [facility] to [facility] for neurology evaluation possible intervention  She was noted to right MCA stroke  Eval by neurology and deemed not a candidate for tPA  S/p mechanical thrombectomy  Continue atorvastatin  Neurosurgery inputs noted  Discussed with neurology, plans for outpatient imaging studies and evaluation for antiplatelet medication noted  Physical therapy

## 2023-02-15 NOTE — PROGRESS NOTES
[facility]  Progress Note - Alayna Rubin 1955, 75 y o  female MRN: [id number]  [facility] Encounter: [id number]  Primary Care Provider: Klaudia Saeed DO   Date and time admitted to hospital: 2/12/2023  5:00 AM    * Stroke [facility])  Assessment & Plan  Patient with history hypertension presented with left-sided weakness slurred speech and transferred from [place] [address] for neurology evaluation possible intervention  She was noted to right MCA stroke  Eval by neurology and deemed not a candidate for tPA  S/p mechanical thrombectomy  Continue atorvastatin  Neurosurgery inputs noted  Discussed with neurology, plans for outpatient imaging studies and evaluation for antiplatelet medication noted  Physical therapy        HFrEF (heart failure with reduced ejection fraction) ([facility] )  Assessment & Plan  2D echo revealed EF 25 to 30%  Continue carvedilol  Cardiology following    HTN (hypertension)  Assessment & Plan  Monitor blood pressures  Carvedilol  Avoid hypotension    Cardiomyopathy ([facility] )  Assessment & Plan  2D echo reveals EF 25 to 30%  Continue carvedilol  Cardiology plans for outpatient work-up noted  Discussed with cardiology    Bipolar depression [facility])  Assessment & Plan  Continue duloxetine, bupropion  Outpatient follow-up      Seronegative arthropathy of multiple sites [facility])  Assessment & Plan  Continue Plaquenil  Outpatient rheumatology notes noted  Outpatient rheumatology follow-up    Ambulatory dysfunction  Assessment & Plan  Safe ambulation  Fall precautions  Physical therapy    Chronic pain syndrome  Assessment & Plan  Patient history of chronic pain  Prior records noted  Patient chronically on MS Contin 15 mg twice daily  Bowel regimen              VTE Pharmacologic Prophylaxis: VTE Score: 8 High Risk (Score >/= 5) - Pharmacological DVT Prophylaxis Ordered: enoxaparin (Lovenox)   Sequential Compression Devices Ordered  Patient Centered Rounds: I performed bedside rounds with nursing staff today  Discussions with Specialists or Other Care Team Provider: Cardiology    Education and Discussions with Family / Patient: Patient, updated daughter Jasbir Gann  Total Time Spent on Date of Encounter in care of patient: 35 minutes This time was spent on one or more of the following: performing physical exam; counseling and coordination of care; obtaining or reviewing history; documenting in the medical record; reviewing/ordering tests, medications or procedures; communicating with other healthcare professionals and discussing with patient's family/caregivers  Current Length of Stay: 3 day(s)  Current Patient Status: Inpatient   Certification Statement: The patient will continue to require additional inpatient hospital stay due to as outlined  Discharge Plan: pending placement - case management following    Code Status: Level 1 - Full Code    Subjective:     Sitting up in chair  Reports feeling okay  Encourage incentive spirometry as able    Objective:     Vitals:   Temp (24hrs), Av 7 °F (36 5 °C), Min:97 7 °F (36 5 °C), Max:97 7 °F (36 5 °C)    Temp:  [97 7 °F (36 5 °C)] 97 7 °F (36 5 °C)  HR:  [] 90  BP: (133-142)/(86-91) 133/86  SpO2:  [82 %-98 %] 98 %  Body mass index is 25 62 kg/m²  Input and Output Summary (last 24 hours):      Intake/Output Summary (Last 24 hours) at 2/15/2023 1300  Last data filed at 2/15/2023 0802  Gross per 24 hour   Intake 360 ml   Output 100 ml   Net 260 ml       Physical Exam:   Physical Exam     Sitting up in chair  Neck supple  Lungs diminished breath sounds bilaterally  Heart sounds S1 and S2 noted  Abdomen soft nontender  Awake obey simple commands  No pedal edema  No rash    Additional Data:     Labs:  Results from last 7 days   Lab Units 23  0512   WBC Thousand/uL 7 87   HEMOGLOBIN g/dL 13 5   HEMATOCRIT % 41 5   PLATELETS Thousands/uL 197   NEUTROS PCT % 80*   LYMPHS PCT % 12*   MONOS PCT % 8   EOS PCT % 0     Results from last 7 days   Lab Units 02/13/23  0512   SODIUM mmol/L 143   POTASSIUM mmol/L 3 8   CHLORIDE mmol/L 117*   CO2 mmol/L 21   BUN mg/dL 11   CREATININE mg/dL 0 75   ANION GAP mmol/L 5   CALCIUM mg/dL 9 6   GLUCOSE RANDOM mg/dL 95     Results from last 7 days   Lab Units 02/12/23  0302   INR  0 90     Results from last 7 days   Lab Units 02/12/23  0259   POC GLUCOSE mg/dl 113     Results from last 7 days   Lab Units 02/13/23  0512   HEMOGLOBIN A1C % 5 0           Lines/Drains:  Invasive Devices     Peripheral Intravenous Line  Duration           Peripheral IV 02/12/23 Left Forearm 2 days    Peripheral IV 02/13/23 Left Antecubital 2 days                  Telemetry:  Telemetry Orders (From admission, onward)             48 Hour Telemetry Monitoring  Continuous x 48 hours        References:    Telemetry Guidelines   Question:  Reason for 48 Hour Telemetry  Answer:  Acute Decompensated CHF (continuous diuretic infusion or total diuretic dose > 200 mg daily, associated electrolyte derangement, ionotropic drip, history of ventricular arrhythmia, or new EF <35%)                 Telemetry Reviewed: Sinus rhythm  Indication for Continued Telemetry Use: Acute CHF on >200 mg lasix/day or equivalent dose or with new reduced EF         Imaging: Reviewed radiology reports from this admission including: CT head    Recent Cultures (last 7 days):         Last 24 Hours Medication List:   Current Facility-Administered Medications   Medication Dose Route Frequency Provider Last Rate   • acetaminophen  975 mg Oral Q6H PRN Dione Chen MD     • aluminum-magnesium hydroxide-simethicone  30 mL Oral Q4H PRN Tone Whaley PA-C     • atorvastatin  40 mg Oral QPM Dione Chen MD     • buPROPion  100 mg Oral BID Dione Chen MD     • carvedilol  6 25 mg Oral BID With Meals Lisa Lehman DO     • DULoxetine  60 mg Oral BID Dione Chen MD     • enoxaparin  40 mg Subcutaneous Q24H [address] Yanely Kaplan MD     • gabapentin  300 mg Oral BID Yanely Kaplan MD     • hydrALAZINE  10 mg Intravenous Q6H PRN Yanely Kaplan MD     • hydroxychloroquine  200 mg Oral Once per day on Mon Tue Wed Thu Fri Yanely Kaplan MD     • [START ON 2/18/2023] hydroxychloroquine  400 mg Oral Once per day on Sun Sat Yanely Kaplan MD     • lamoTRIgine  150 mg Oral HS Yanely Kaplan MD     • melatonin  6 mg Oral HS Yanely Kaplan MD     • morphine  15 mg Oral Q12H [address] Yanely Kaplan MD     • oxyCODONE  2 5 mg Oral Q6H PRN Yanely Kaplan MD      Or   • oxyCODONE  5 mg Oral Q6H PRN Yanely Kaplan MD     • polyethylene glycol  17 g Oral Daily Joseline Wei MD     • senna-docusate sodium  2 tablet Oral BID Joseline Wei MD          Today, Patient Was Seen By: Joseline Wei MD    **Please Note: This note may have been constructed using a voice recognition system  **

## 2023-02-16 ENCOUNTER — APPOINTMENT (INPATIENT)
Dept: NON INVASIVE DIAGNOSTICS | Facility: HOSPITAL | Age: 68
End: 2023-02-16

## 2023-02-16 VITALS
DIASTOLIC BLOOD PRESSURE: 76 MMHG | TEMPERATURE: 98 F | BODY MASS INDEX: 24.45 KG/M2 | SYSTOLIC BLOOD PRESSURE: 120 MMHG | HEIGHT: 63 IN | OXYGEN SATURATION: 96 % | WEIGHT: 138 LBS | RESPIRATION RATE: 14 BRPM | HEART RATE: 116 BPM

## 2023-02-16 LAB
AORTIC VALVE MEAN VELOCITY: 6.6 M/S
APICAL FOUR CHAMBER EJECTION FRACTION: 31 %
AV LVOT MEAN GRADIENT: 1 MMHG
AV LVOT PEAK GRADIENT: 1 MMHG
AV MEAN GRADIENT: 2 MMHG
AV PEAK GRADIENT: 4 MMHG
AV VELOCITY RATIO: 0.56
DOP CALC AO PEAK VEL: 0.96 M/S
DOP CALC AO VTI: 14.08 CM
DOP CALC LVOT PEAK VEL VTI: 9.22 CM
DOP CALC LVOT PEAK VEL: 0.54 M/S
LEFT VENTRICLE DIASTOLIC VOLUME (MOD BIPLANE): 149 ML
LEFT VENTRICLE SYSTOLIC VOLUME (MOD BIPLANE): 110 ML
LV EF: 26 %
SL CV LV EF: 30

## 2023-02-16 RX ORDER — ATORVASTATIN CALCIUM 40 MG/1
40 TABLET, FILM COATED ORAL EVERY EVENING
Refills: 0 | Status: ON HOLD
Start: 2023-02-16

## 2023-02-16 RX ORDER — MORPHINE SULFATE 15 MG/1
15 TABLET, FILM COATED, EXTENDED RELEASE ORAL 2 TIMES DAILY
Qty: 5 TABLET | Refills: 0 | Status: ON HOLD | OUTPATIENT
Start: 2023-02-16

## 2023-02-16 RX ORDER — METOPROLOL SUCCINATE 25 MG/1
25 TABLET, EXTENDED RELEASE ORAL 2 TIMES DAILY
Status: DISCONTINUED | OUTPATIENT
Start: 2023-02-16 | End: 2023-02-16 | Stop reason: HOSPADM

## 2023-02-16 RX ORDER — ALBUTEROL SULFATE 90 UG/1
2 AEROSOL, METERED RESPIRATORY (INHALATION) EVERY 4 HOURS PRN
Status: DISCONTINUED | OUTPATIENT
Start: 2023-02-16 | End: 2023-02-16 | Stop reason: HOSPADM

## 2023-02-16 RX ORDER — METOPROLOL SUCCINATE 25 MG/1
25 TABLET, EXTENDED RELEASE ORAL 2 TIMES DAILY
Refills: 0 | Status: ON HOLD
Start: 2023-02-16

## 2023-02-16 RX ADMIN — MORPHINE SULFATE 15 MG: 15 TABLET, FILM COATED, EXTENDED RELEASE ORAL at 09:38

## 2023-02-16 RX ADMIN — ATORVASTATIN CALCIUM 40 MG: 40 TABLET, FILM COATED ORAL at 17:05

## 2023-02-16 RX ADMIN — SENNOSIDES AND DOCUSATE SODIUM 2 TABLET: 8.6; 5 TABLET ORAL at 09:37

## 2023-02-16 RX ADMIN — CARVEDILOL 6.25 MG: 6.25 TABLET, FILM COATED ORAL at 09:37

## 2023-02-16 RX ADMIN — HYDROXYCHLOROQUINE SULFATE 200 MG: 200 TABLET ORAL at 09:40

## 2023-02-16 RX ADMIN — OXYCODONE HYDROCHLORIDE 5 MG: 5 TABLET ORAL at 11:10

## 2023-02-16 RX ADMIN — METOPROLOL SUCCINATE 25 MG: 25 TABLET, FILM COATED, EXTENDED RELEASE ORAL at 17:08

## 2023-02-16 RX ADMIN — ACETAMINOPHEN 975 MG: 325 TABLET, FILM COATED ORAL at 13:13

## 2023-02-16 RX ADMIN — PERFLUTREN 0.8 ML/MIN: 6.52 INJECTION, SUSPENSION INTRAVENOUS at 13:14

## 2023-02-16 RX ADMIN — GABAPENTIN 300 MG: 300 CAPSULE ORAL at 09:36

## 2023-02-16 RX ADMIN — SENNOSIDES AND DOCUSATE SODIUM 2 TABLET: 8.6; 5 TABLET ORAL at 17:04

## 2023-02-16 RX ADMIN — GABAPENTIN 300 MG: 300 CAPSULE ORAL at 17:04

## 2023-02-16 RX ADMIN — BUPROPION HYDROCHLORIDE 100 MG: 100 TABLET, FILM COATED ORAL at 09:39

## 2023-02-16 RX ADMIN — ENOXAPARIN SODIUM 40 MG: 40 INJECTION SUBCUTANEOUS at 09:38

## 2023-02-16 RX ADMIN — DULOXETINE HYDROCHLORIDE 60 MG: 60 CAPSULE, DELAYED RELEASE ORAL at 09:38

## 2023-02-16 RX ADMIN — DULOXETINE HYDROCHLORIDE 60 MG: 60 CAPSULE, DELAYED RELEASE ORAL at 17:04

## 2023-02-16 RX ADMIN — OXYCODONE HYDROCHLORIDE 5 MG: 5 TABLET ORAL at 03:07

## 2023-02-16 RX ADMIN — BUPROPION HYDROCHLORIDE 100 MG: 100 TABLET, FILM COATED ORAL at 17:05

## 2023-02-16 NOTE — SPEECH THERAPY NOTE
Speech Language/Pathology    Speech/Language Pathology Progress Note    Patient Name: Naman Tamayo  ESZZP'F Date: 2/16/2023     Problem List  Principal Problem:    Stroke [facility])  Active Problems:    Chronic pain syndrome    Ambulatory dysfunction    Seronegative arthropathy of multiple sites ([facility] )    Bipolar depression ([place] [address] )    Cardiomyopathy ([place] [address] )    HTN (hypertension)    HFrEF (heart failure with reduced ejection fraction) ([place] [address] )       Past Medical History  Past Medical History:   Diagnosis Date   • Anxiety    • Asthma    • Bipolar depression ([place] [address] )    • Chronic narcotic dependence ([place] [address] )    • Chronic pain    • Depression    • Enterovirus heart infection    • SHAR (obstructive sleep apnea)    • Osteoarthritis    • Peripheral neuropathy    • Plantar fasciitis of right foot    • Senile osteoporosis    • Seronegative arthropathy of multiple sites ([place] [address] )    • Undifferentiated connective tissue disease ([place] [address] )         Past Surgical History  Past Surgical History:   Procedure Laterality Date   • BREAST IMPLANT     • FIXATION KYPHOPLASTY LUMBAR SPINE     • HYSTERECTOMY     • INCONTINENCE SURGERY N/A    • NASAL SEPTOPLASTY W/ TURBINOPLASTY N/A    • RECTAL PROLAPSE REPAIR N/A    • REPAIR RECTOCELE     • RHINOPLASTY N/A          Subjective:  "Do you think my speech is better?" Patient is OOB in chair  Awake and alert  Objective: The patient is seen for speech therapy  She continues with left side facial weakness at rest and with retraction (smile)  Lingual movement appears adequate, but continues to retract in oral cavity when speaking  The patient participates in structured speech activity  Reading glasses are not present, so patient repeats /n/ words, phrases and sentences  Patient independently over-articulates  Intelligibility is good at mono-syllable word level, but does become more impaired with longer words and phrases  The patient repeats ~50% of sentences without errors   Patient is aware that speech intelligibility is affected  Patient also participates in prosody task, repeating longer phrases and sentences  Prosody continues to be impaired and patient is somewhat robotic  Despite this, intelligibility is good-fair during conversation  Assessment:  Patient has good participation  Speech is min improved  Plan/Recommendations:  Continue ST towards goals

## 2023-02-16 NOTE — ASSESSMENT & PLAN NOTE
Patient with history hypertension presented with left-sided weakness slurred speech and [place] [facility] [place] [address] for neurology evaluation possible intervention  She was noted to right MCA stroke  Eval by neurology and deemed not a candidate for tPA  S/p mechanical thrombectomy  Continue atorvastatin  Neurosurgery inputs noted  Discussed with neurology, plans for outpatient imaging studies and evaluation for antiplatelet medication noted  Physical therapy

## 2023-02-16 NOTE — CASE MANAGEMENT
Case Management Discharge Planning Note    Patient name Zondra Apley  Location [address] [place] [address] [facility] [id number]  : 1955 Date 2023       Current Admission Date: 2023  Current Admission Diagnosis:Stroke [facility])   Patient Active Problem List    Diagnosis Date Noted   • HFrEF (heart failure with reduced ejection fraction) ([facility] ) 2023   • Stroke ([facility] ) 2023   • HTN (hypertension) 2023   • Cardiomyopathy ([facility] ) 2023   • Vitamin D insufficiency 2023   • LAILA (acute kidney injury) ([facility] ) 2022   • Polypharmacy 2022   • 'light-for-dates' infant with signs of fetal malnutrition 2022   • Bipolar depression ([facility] ) 2022   • Rheumatoid arthritis ([place] [address] ) 2022   • Closed fracture of multiple ribs of right side 2022   • Traumatic pneumothorax 2022   • Closed fracture of transverse process of lumbar vertebra ([facility] ) 2022   • Liver contusion 2022   • Fall 2022   • Acute pain due to trauma 2022   • Right shoulder pain 2022   • Severe protein-calorie malnutrition ([facility] ) 2022   • Undifferentiated connective tissue disease ([place] [address] ) 2021   • Primary generalized (osteo)arthritis 2021   • Peripheral neuropathy 2021   • Senile osteoporosis 2021   • Hx of compression fracture of spine 2021   • Lumbar spondylosis 2021   • Cervical spondylosis 2021   • Anemia 2021   • Chronic pain syndrome    • Asthma    • Ambulatory dysfunction    • Acute metabolic encephalopathy    • Seronegative arthropathy of multiple sites ([facility] )    • Gastroesophageal reflux disease without esophagitis    • Mixed stress and urge urinary incontinence    • CHF (congestive heart failure) ([facility] )       LOS (days): 4  Geometric Mean LOS (GMLOS) (days): 1 90  Days to GMLOS:-2 5     OBJECTIVE:  Risk of Unplanned Readmission Score: 16 35         Current admission status: Inpatient   Preferred Pharmacy:   [facility] [address]  [facility]  [address]  [address] [address]  Phone: [phone] Fax: [phone]    Primary Care Provider: Ean Prince DO    Primary Insurance: MEDICARE  Secondary Insurance: AARP    DISCHARGE DETAILS:    Discharge planning discussed with[de-identified] CM spoke with the pt and her dtr Lian Delay of Choice: Yes  Comments - Freedom of Choice: FOC explored and [facility] preferred for IRF services  CM contacted family/caregiver?: Yes  Were Treatment Team discharge recommendations reviewed with patient/caregiver?: Yes  Did patient/caregiver verbalize understanding of patient care needs?: Yes  Were patient/caregiver advised of the risks associated with not following Treatment Team discharge recommendations?: Yes    Contacts  Patient Contacts: Pippa Holloway  Relationship to Patient[de-identified] Family  Contact Method: Phone  Reason/Outcome: Continuity of Care, Discharge Planning    [facility]         Is the patient interested in [facility] at discharge?: No    DME Referral Provided  Referral made for DME?: No    Other Referral/Resources/Interventions Provided:  Interventions: Acute Rehab  Referral Comments: Pt referred for IRF and [facility] is the preferred IRF provider at this time  [facility] made aware  Would you like to participate in our [address] service program?  : No - Declined    Treatment Team Recommendation: Acute Rehab  Discharge Destination Plan[de-identified] Acute Rehab  Transport at Discharge : Wheelchair Regina Napier  Dispatcher Contacted: Yes        ETA of Transport (Date): 02/16/23  ETA of Transport (Time): 1700     Transfer Mode: Wheelchair  Accompanied by: Alone     IMM Given (Date):: 02/16/23  IMM Given to[de-identified] Patient     Additional Comments: The pt is medically stable for hospital discharge and post acute care planning is finalized  FOC was explored and [facility] is the preferred IRF provider at this time  CM alerted [facility] of referral cancellation  Wheelchair transport was arranged for the pt   Her dtr is aware discharge is scheduled for this date  No further CM intervention is required

## 2023-02-16 NOTE — NURSING NOTE
AVS discussed and reviewed with patient and receiving facility at [facility]  All questions answered  Receiving facility verbalized understanding with all shared information  All personal belongings returned to patient via transport  Peripheral IV removed without any active bleeding  Well tolerated by patient  Patient will be in room 301 at [facility]  Her daughter was also updated via telephone  Patient was discharged via wheelchair accompanied by transport team  Patient on her way to [facility] via wheelchair van at 5263  Patient discharged on room air without any acute distress  Denies pain and or discomfort   SD 2/16/2023

## 2023-02-16 NOTE — OCCUPATIONAL THERAPY NOTE
Occupational Therapy Treatment Note     02/16/23 0926   OT Last Visit   OT Visit Date 02/16/23   Note Type   Note Type Treatment   Pain Assessment   Pain Assessment Tool 0-10   Pain Score 8   Pain Location/Orientation Location: Back   Restrictions/Precautions   Weight Bearing Precautions Per Order No   Lifestyle   Autonomy Pt receives some A with ADLs and IADLs, and uses a cane at baseline  (-)   Reciprocal Relationships Pt lives with her son and brother  Service to Others Pt is retired  Intrinsic Gratification Pt enjoys being with her grandkids  ADL   Where Assessed Sitting at sink   Grooming Assistance 5  Supervision/Setup   Grooming Deficit Setup   UB Bathing Assistance 5  Supervision/Setup   UB Bathing Deficit Setup; Increased time to complete   LB Bathing Assistance 4  Minimal Assistance   LB Bathing Deficit Buttocks   UB Dressing Assistance 4  Minimal Assistance   UB Dressing Deficit Thread RUE; Thread LUE;Pull around back   LB Dressing Assistance 3  Moderate Assistance   LB Dressing Deficit Thread RLE into underwear; Thread LLE into underwear   Bed Mobility   Supine to Sit 4  Minimal assistance   Additional items Assist x 1   Transfers   Sit to Stand 4  Minimal assistance   Additional items Assist x 1   Stand to Sit 4  Minimal assistance   Additional items Assist x 1   Toilet transfer 3  Moderate assistance   Additional items Assist x 1; Increased time required   Functional Mobility   Functional Mobility 4  Minimal assistance   Additional items Rolling walker   Toilet Transfers   Toilet Transfer From Rolling walker   Toilet Transfer Type To and from   Toilet Transfer to Standard toilet   Toilet Transfer Technique Ambulating   Toilet Transfers Minimal assistance   Subjective   Subjective pt acknowledged that she was "confused" yesterday     Cognition   Overall Cognitive Status Impaired   Arousal/Participation Responsive   Attention Attends with cues to redirect   Orientation Level Oriented X4 Memory Unable to assess   Following Commands Follows one step commands without difficulty   Activity Tolerance   Activity Tolerance Patient tolerated treatment well   Assessment   Assessment Pt seen for participation in Occupational Therapy session with focus on activity tolerance, bed mob, functional transfers/mob, sitting balance and tolerance and standing tolerance and balance for pt engagement in UB/LB self-care tasks and energy conservation techniques  Pt cleared by JULISA/Yasmin for pt participated in OT session  Pt presented supine/HOB raised pt awake/alert and agreeable to participate in therapy following pt identifiers confirmed  Pt reported his therapy goal to go to rehab  She reported that she would like a rolling walker for home Pt required assist for  Functional transfers/mob and ub/lb self-care 2* pt coordination, balance and strength  Pt will require post acute rehab service to continue to address these above noted pt deficit which currently impair pt ADL and functional mob  Pt return to sitting out of bed to bedside chair post session, chair alarm activated and all needs within reach     Plan   Treatment Interventions ADL retraining   Goal Expiration Date 02/27/23   OT Treatment Day 1   OT Frequency 3-5x/wk   Recommendation   OT Discharge Recommendation Post acute rehabilitation services   AM-PAC Daily Activity Inpatient   Lower Body Dressing 2   Bathing 2   Toileting 2   Upper Body Dressing 3   Grooming 3   Eating 4   Daily Activity Raw Score 16   Daily Activity Standardized Score (Calc for Raw Score >=11) 35 96   AM-PAC Applied Cognition Inpatient   Following a Speech/Presentation 3   Understanding Ordinary Conversation 4   Taking Medications 4   Remembering Where Things Are Placed or Put Away 3   Remembering List of 4-5 Errands 3   Taking Care of Complicated Tasks 2   Applied Cognition Raw Score 19   Applied Cognition Standardized Score 39 77   Barthel Index   Grooming Score 5   Dressing Score 5 Toilet Use Score 5   Transfers (Bed/Chair) Score 5   Mobility (Level Surface) Score 0       Anjel Vasquez  SILVERMAN/L

## 2023-02-16 NOTE — RESTORATIVE TECHNICIAN NOTE
Restorative Technician Note      Patient Name: Naman Tamayo     Note Type: Mobility  Patient Position Upon Consult: Bedside chair  Activity Performed: Ambulated; ITROFMC; Stood  Assistive Device: Roller walker  Education Provided: Yes  Patient Position at [employer] of Consult: Bedside chair;  All needs within reach; Bed/Chair alarm activated  Gris BAPTISTE, Restorative Technician, [employer]

## 2023-02-16 NOTE — RESTORATIVE TECHNICIAN NOTE
Restorative Technician Note      Patient Name: Matilde Dowling     Note Type: Mobility  Patient Position Upon Consult: Supine  Activity Performed: Ambulated; [id number]; Stood  Assistive Device: Roller walker  Education Provided: Yes  Patient Position at [employer] of Consult: Bedside chair;  All needs within reach; Bed/Chair alarm activated    Judith BAPTISTE, Restorative Technician, [employer]

## 2023-02-16 NOTE — NURSING NOTE
Spoke with facility to fax over script for Morphine  Facility requested to have script to [phone]  Script faxed over  Patient also claimed that she left her laptop in her room  Spoke with her daughter Teodoro Doan who confirmed she brought it home today at her daughter's request  Same was relayed to [facility]   SD 2/16/2023

## 2023-02-16 NOTE — NURSING NOTE
Called [facility] for report at [phone]   Spoke with Gino Perezr on rehab 3  Patient assigned room 301  Report given  All questions answered  Call back number also provided   SD 2/19/2023

## 2023-02-16 NOTE — PLAN OF CARE
Problem: OCCUPATIONAL THERAPY ADULT  Goal: Performs self-care activities at highest level of function for planned discharge setting  See evaluation for individualized goals  Description: Treatment Interventions: ADL retraining, Functional transfer training, UE strengthening/ROM, Endurance training, Patient/family training, Equipment evaluation/education, Neuromuscular reeducation, Fine motor coordination activities, Compensatory technique education, Continued evaluation, Energy conservation, Activityengagement          See flowsheet documentation for full assessment, interventions and recommendations  Outcome: Progressing  Note: Limitation: Decreased ADL status, Decreased UE ROM, Decreased UE strength, Decreased endurance, Decreased self-care trans, Decreased high-level ADLs  Prognosis: Fair  Assessment: Pt seen for participation in Occupational Therapy session with focus on activity tolerance, bed mob, functional transfers/mob, sitting balance and tolerance and standing tolerance and balance for pt engagement in UB/LB self-care tasks and energy conservation techniques  Pt cleared by RN/Yasmin for pt participated in OT session  Pt presented supine/HOB raised pt awake/alert and agreeable to participate in therapy following pt identifiers confirmed  Pt reported his therapy goal to go to rehab  She reported that she would like a rolling walker for home Pt required assist for  Functional transfers/mob and ub/lb self-care 2* pt coordination, balance and strength  Pt will require post acute rehab service to continue to address these above noted pt deficit which currently impair pt ADL and functional mob  Pt return to sitting out of bed to bedside chair post session, chair alarm activated and all needs within reach       OT Discharge Recommendation: Post acute rehabilitation services

## 2023-02-16 NOTE — PROGRESS NOTES
Heart Failure/ Pulmonary Hypertension Progress Note - Dale Hernandez 75 y o  female MRN: [id number]    Unit/Bed#: [facility] 710-01 Encounter: [id number]      Assessment:    Principal Problem:    Stroke [facility])  Active Problems:    Chronic pain syndrome    Ambulatory dysfunction    Seronegative arthropathy of multiple sites ([place] [address] )    Bipolar depression ([place] [address] )    Cardiomyopathy ([place] [address] )    HTN (hypertension)    HFrEF (heart failure with reduced ejection fraction) [facility])    27-year-old female with a past medical history significant for bipolar depression, hypertension, rheumatoid arthritis with seronegative arthropathy, undifferentiated CTD, history of nonischemic cardiomyopathy with improved EF who presented with slurred speech and left sided weakness  Found to have acute to subacute right MCA territory infarct  Heart failure consulted for re-reduction in EF to 25-30%     # Cardiomyopathy  Has h/o cardiomyopathy with EF as low as 10-15% in early 2000s with improvement in LVEF with GDMT  Last echo prior to admission with EF of 63%  Down to 25-30% this admission likely stress/stroke mediated, although reports having chest pain and dyspnea on exertion the last few days prior to admission  Recently off coreg  On plaquenil for seronegative arthropathy     Studies- personally reviewed by me     EKG: sinus rhythm, T wave abnormality  Echocardiogram 2/12/23  LVEF: 25-30%  LVIDd: 5 2cm  RV: normal size and systolic function  MR: mild  PASP: inadequate TR envelope for estimation, trace TR  RVOT: no notching  Other: mild to moderate AI  Echo 4/23/22: LVEF 63%  Normal RV size and systolic function  Mild to moderate AI   Trace TR       Neurohormonal Blockade:  --Beta-Blocker: coreg 6 25mg BID   --ACEi, ARB or ARNi:  --Aldosterone Receptor Blocker:  --SGLT2 Inhibitor:  --Diuretic:     Sudden Cardiac Death Risk Reduction:  --ICD: Will reassess EF     Electrical Resynchronization:  --Candidacy for BiV device: narrow QRSd     Advanced Therapies (if appropriate): --We will continue to monitor     Acute to subacute R MCA infarct and R M1 occlusion s/p mechanical thrombectomy TICI 3 revascularization  Currently not on antiplatelet therapy/AC due to  superimposed petechial hemorrhage and punctate acute/subactue infarct in the right cerebellum  Per neurology 2/15/23: "- Would still hold on any AP/AC; repeat CT head in 10 days (2/23):       -during that repeat CT: if hemorrhage improved, but still present to a degree would start aspirin 81 mg daily          -when hemorrhage fully resolved radiographically, would then transition to anticoagulation (ideally Eliquis 5 mg BID)"  Rx: statin  Mild to moderate AI  Seronegative arthropathy, on plaquenil  Sinus tachycardia     Today's Plan:  Euvolemic and warm on exam  Heart rate remains on the high side  Switch coreg to metoprolol succinate 25mg BID  Eventual anticoagulation per neurology  Repeat echo ordered for today to reassess EF  Outpatient ischemic evaluation    Addendum:  Repeat echocardiogram showed LVEF of 30%  Discussed with patient and daughter Bjorn Walker risks and benefits of LifeVest  Patient expressed that she would just be anxious of wearing the LifeVest and anticipating a possible shock  She has also has strength and coordination deficits on her left hand that will make it difficult to manage the LifeVest  Patient ultimately expressed that she does not want it and daughter aware  Discussed that given her recent stroke with superimposed petechial hemorrhage, antiplatelet/anticogulation deferred until repeat head CT next week  Given above reasons, will defer ischemic evaluation at this time  Subjective:   Patient seen and examined  No significant events overnight  Feeling fine, no chest pain or shortness of breath  No palpitations    Review of Systems   Constitutional: Negative for chills and fever  Respiratory: Negative for chest tightness and shortness of breath      Cardiovascular: Negative for chest pain, palpitations and leg swelling  Gastrointestinal: Negative for abdominal distention, abdominal pain, nausea and vomiting  Neurological: Negative for dizziness and light-headedness  Objective: Intake/ Output: ?  Weight: 138 lbs   Tele: sinus rhythm, HR 90s-100s      Vitals: Blood pressure 118/77, pulse 103, temperature (!) 97 3 °F (36 3 °C), resp  rate 16, height 5' 3" (1 6 m), weight 62 8 kg (138 lb 7 2 oz), SpO2 97 %  , Body mass index is 24 53 kg/m² , I/O last 3 completed shifts: In: 480 [P O :480]  Out: 100 [Urine:100]  No intake/output data recorded  Wt Readings from Last 3 Encounters:   02/16/23 62 8 kg (138 lb 7 2 oz)   02/12/23 67 9 kg (149 lb 11 1 oz)   09/14/22 67 kg (147 lb 12 8 oz)       Intake/Output Summary (Last 24 hours) at 2/16/2023 1033  Last data filed at 2/15/2023 2000  Gross per 24 hour   Intake 120 ml   Output --   Net 120 ml     I/O last 3 completed shifts: In: 480 [P O :480]  Out: 100 [Urine:100]    No significant arrhythmias seen on telemetry review         Physical Exam:  Vitals:    02/15/23 1456 02/15/23 2113 02/16/23 0600 02/16/23 0935   BP: 133/85 125/80  118/77   BP Location:    Right arm   Pulse: (!) 112 98  103   Resp:    16   Temp: (!) 97 3 °F (36 3 °C)      TempSrc:       SpO2: 97% 96%  97%   Weight:   62 8 kg (138 lb 7 2 oz)    Height:           GEN: Saskia Frank appears well, alert, pleasant and cooperative   HEENT: NC/AT, moist mucosa, anicteric sclerae; extraocular muscles intact  NECK: supple, no carotid bruits   HEART: regular rhythm, normal S1 and S2, no murmurs, clicks, gallops or rubs, JVP is nonelevated   LUNGS: clear to auscultation bilaterally; no wheezes, rales, or rhonchi   ABDOMEN: normal bowel sounds, soft, no tenderness, no distention  EXTREMITIES: peripheral pulses normal; no clubbing, cyanosis, or edema  NEURO: left upper and lower extremity weakness  SKIN: normal without suspicious lesions on exposed skin      Current Facility-Administered Medications:   •  acetaminophen (TYLENOL) tablet 975 mg, 975 mg, Oral, Q6H PRN, Barron Carl MD, 975 mg at 02/12/23 1654  •  aluminum-magnesium hydroxide-simethicone (MYLANTA) oral suspension 30 mL, 30 mL, Oral, Q4H PRN, Mary Jane Parker PA-C, 30 mL at 02/14/23 1538  •  atorvastatin (LIPITOR) tablet 40 mg, 40 mg, Oral, QPM, Barron Carl MD, 40 mg at 02/15/23 1831  •  buPROPion [facility]) tablet 100 mg, 100 mg, Oral, BID, Barron Carl MD, 100 mg at 02/16/23 1849  •  DULoxetine (CYMBALTA) delayed release capsule 60 mg, 60 mg, Oral, BID, Barron Carl MD, 60 mg at 02/16/23 5291  •  enoxaparin (LOVENOX) subcutaneous injection 40 mg, 40 mg, Subcutaneous, Q24H [facility], Barron Carl MD, 40 mg at 02/16/23 1835  •  gabapentin (NEURONTIN) capsule 300 mg, 300 mg, Oral, BID, Barron Carl MD, 300 mg at 02/16/23 2304  •  hydrALAZINE (APRESOLINE) injection 10 mg, 10 mg, Intravenous, Q6H PRN, Barron Carl MD  •  hydroxychloroquine (PLAQUENIL) tablet 200 mg, 200 mg, Oral, Once per day on Mon Tue Wed Thu Fri, Barron Carl MD, 200 mg at 02/16/23 0940  •  [START ON 2/18/2023] hydroxychloroquine (PLAQUENIL) tablet 400 mg, 400 mg, Oral, Once per day on Sun Sat, Barron Carl MD  •  lamoTRIgine (LaMICtal) tablet 150 mg, 150 mg, Oral, HS, Barron Carl MD, 150 mg at 02/15/23 2127  •  melatonin tablet 6 mg, 6 mg, Oral, HS, Barron Carl MD, 6 mg at 02/15/23 2126  •  metoprolol succinate (TOPROL-XL) 24 hr tablet 25 mg, 25 mg, Oral, BID, Sharon Singh MD  •  morphine (MS CONTIN) ER tablet 15 mg, 15 mg, Oral, Q12H [facility], Barron Carl MD, 15 mg at 02/16/23 8337  •  oxyCODONE (ROXICODONE) IR tablet 2 5 mg, 2 5 mg, Oral, Q6H PRN **OR** oxyCODONE (ROXICODONE) IR tablet 5 mg, 5 mg, Oral, Q6H PRN, Barron Carl MD, 5 mg at 02/16/23 8144  •  polyethylene glycol (MIRALAX) packet 17 g, 17 g, Oral, Daily, Wei Bentley MD, 17 g at 02/14/23 1539  •  senna-docusate sodium (SENOKOT S) 8 6-50 mg per tablet 2 tablet, 2 tablet, Oral, BID, Wei Bentley MD, 2 tablet at 02/16/23 0937      Labs & Results:        Results from last 7 days   Lab Units 02/13/23  0512 02/12/23  0302   WBC Thousand/uL 7 87 6 86   HEMOGLOBIN g/dL 13 5 14 5   HEMATOCRIT % 41 5 44 5   PLATELETS Thousands/uL 197 216         Results from last 7 days   Lab Units 02/13/23  0512 02/12/23  0302   POTASSIUM mmol/L 3 8 3 9   CHLORIDE mmol/L 117* 109*   CO2 mmol/L 21 28   BUN mg/dL 11 13   CREATININE mg/dL 0 75 0 94   CALCIUM mg/dL 9 6 10 8*     Results from last 7 days   Lab Units 02/12/23  0302   INR  0 90         Sharon Singh MD  Advanced Heart Failure and Mechanical [place]

## 2023-02-16 NOTE — ASSESSMENT & PLAN NOTE
2D echo reveals EF 25 to 30%  Repeat echo today EF 30%  Now on metoprolol   discussed with cardiology  Cardiology notes regarding LifeVest noted  Cardiology plans for outpatient work-up noted

## 2023-02-16 NOTE — PLAN OF CARE
Problem: PAIN - ADULT  Goal: Verbalizes/displays adequate comfort level or baseline comfort level  Description: Interventions:  - Encourage patient to monitor pain and request assistance  - Assess pain using appropriate pain scale  - Administer analgesics based on type and severity of pain and evaluate response  - Implement non-pharmacological measures as appropriate and evaluate response  - Consider cultural and social influences on pain and pain management  - Notify physician/advanced practitioner if interventions unsuccessful or patient reports new pain  Outcome: Progressing     Problem: SAFETY ADULT  Goal: Patient will remain free of falls  Description: INTERVENTIONS:  - Educate patient/family on patient safety including physical limitations  - Instruct patient to call for assistance with activity   - Consult OT/PT to assist with strengthening/mobility   - Keep Call bell within reach  - Keep bed low and locked with side rails adjusted as appropriate  - Keep care items and personal belongings within reach  - Initiate and maintain comfort rounds  - Make Fall Risk Sign visible to staff  - Offer Toileting every 2 Hours, in advance of need  - Initiate/Maintain bed alarm  - Obtain necessary fall risk management equipment: socks  - Apply yellow socks and bracelet for high fall risk patients  - Consider moving patient to room near nurses station  Outcome: Progressing  Goal: Maintain or return to baseline ADL function  Description: INTERVENTIONS:  -  Assess patient's ability to carry out ADLs; assess patient's baseline for ADL function and identify physical deficits which impact ability to perform ADLs (bathing, care of mouth/teeth, toileting, grooming, dressing, etc )  - Assess/evaluate cause of self-care deficits   - Assess range of motion  - Assess patient's mobility; develop plan if impaired  - Assess patient's need for assistive devices and provide as appropriate  - Encourage maximum independence but intervene and supervise when necessary  - Involve family in performance of ADLs  - Assess for home care needs following discharge   - Consider OT consult to assist with ADL evaluation and planning for discharge  - Provide patient education as appropriate  Outcome: Progressing  Goal: Maintains/Returns to pre admission functional level  Description: INTERVENTIONS:  - Perform BMAT or MOVE assessment daily    - Set and communicate daily mobility goal to care team and patient/family/caregiver  - Collaborate with rehabilitation services on mobility goals if consulted  - Perform Range of Motion 3 times a day  - Reposition patient every 3 hours    - Dangle patient 3 times a day  - Stand patient 3 times a day  - Ambulate patient 3 times a day  - Out of bed to chair 3 times a day   - Out of bed for meals 3 times a day  - Out of bed for toileting  - Record patient progress and toleration of activity level   Outcome: Progressing

## 2023-02-16 NOTE — DISCHARGE SUMMARY
[address] [facility]  Discharge- Monica Guajardo 1955, 75 y o  female MRN: [id number]  Unit/Bed#: [facility] 710-01 Encounter: [id number]  Primary Care Provider: Anna Keith DO   Date and time admitted to hospital: 2/12/2023  5:00 AM    * Stroke [facility])  Assessment & Plan  Patient with history hypertension presented with left-sided weakness slurred speech and transferred from [place] [id number] for neurology evaluation possible intervention  She was noted to right MCA stroke  Eval by neurology and deemed not a candidate for tPA  S/p mechanical thrombectomy  Continue atorvastatin  Neurosurgery inputs noted  Discussed with neurology, plans for outpatient imaging studies and evaluation for antiplatelet medication noted  Physical therapy        H[facility] failure with reduced ejection fraction) [facility] )  Assessment & Plan  2D echo revealed EF 25 to 30%  Metoprolol  Outpatient cardiology follow-up    HTN (hypertension)  Assessment & Plan  Monitor blood pressures      Cardiomyopathy ([facility] )  Assessment & Plan  2D echo reveals EF 25 to 30%  Repeat echo today EF 30%  Now on metoprolol   discussed with cardiology  Cardiology notes regarding LifeVest noted  Cardiology plans for outpatient work-up noted      Bipolar depression ([facility] )  Assessment & Plan  Continue duloxetine, bupropion  Outpatient follow-up      Seronegative arthropathy of multiple sites [facility])  Assessment & Plan  Continue Plaquenil  Outpatient rheumatology notes noted  Outpatient rheumatology follow-up    Ambulatory dysfunction  Assessment & Plan  Safe ambulation  Fall precautions  Physical therapy    Chronic pain syndrome  Assessment & Plan  Patient history of chronic pain  Prior records noted  Patient chronically on MS Contin 15 mg twice daily  Bowel regimen          Discharge Summary - [facility]    Patient Information: Monica Guajardo 75 y o  female MRN: [id number]  Unit/Bed#: [facility] 710-01 Encounter: [id number]    Discharging Physician / Practitioner: Italia Valencia MD  PCP: Christina Rosa DO  Admission Date: 2/12/2023  Discharge Date: 02/16/23    Disposition:     Other: SNF    Reason for Admission: Strokelike symptoms    Discharge Diagnoses:     Principal Problem:    Stroke [facility])  Active Problems:    Chronic pain syndrome    Ambulatory dysfunction    Seronegative arthropathy of multiple sites ([place] [address] )    Bipolar depression ([place] [address] )    Cardiomyopathy ([place] [address] )    HTN (hypertension)    HFrEF (heart failure with reduced ejection fraction) ([place] [address] )  Resolved Problems:    * No resolved hospital problems  *      Consultations During Hospital Stay:  · Cardiology  · Neurology  · Neurosurgery    Procedures Performed:     CT head    Area of loss of the gray-white matter differentiation involving the right parietotemporal region suspicious for acute ischemia  Occlusion of the distal right MCA M1 segment is also noted on a CTA head and neck performed at same time, compatible with an   acute infarct  CTA head and neck    Abrupt cut off of flow at the distal right MCA M1 segment compatible with an acute infarct  Collateralized blood flow is noted within the distal right MCA branches      3 mm saccular aneurysm arising from the medial aspect of the proximal right ICA supraclinoid segment        No hemodynamically significant stenosis within either common or internal carotid artery  Less than 50% stenosis by NASCET criteria  CT chest abdomen pelvis    No evidence of solid organ injury      No acute intra-abdominal abnormality  No free air or free fluid      Bibasilar atelectasis with no infiltrate or consolidation  No pneumothorax      Stable chronic right posterior 10th, 11th and 12th rib fractures  Stable chronic compression fractures at T11, L1 and L4  No new fracture        CT thoracolumbar spine  No fracture or traumatic subluxation      Stable chronic compression fractures at T11, L1 and L4      Stable old right transverse process fractures at L2 and L3  MRI brain  Acute to subacute right MCA territory infarct with superimposed petechial hemorrhage,    CT head    Evolving right MCA territory infarcts with associated hyperdensity likely representing a combination of resolving retained contrast and superimposed petechial hemorrhage  Follow-up imaging is recommended to document stability/resolution      2D echo EF 25 to 45% grade 2 diastolic dysfunction       Procedure:  1  Right Internal Carotid Arteriogram  2  Right MCA thrombectomy with Solitiare Stent Retreiver and Zoom  3  Right Common carotid angiogram  4  Limited Right Femoral Arteriogram  5  Ultrasound guidance for right radial access  · 6  Complex arterial access increasing length of procedure by greater than 2 fold  Hospital Course:     Nirmala Hastings is a 75 y o  female patient who originally presented to the hospital on 2/12/2023 due to strokelike symptoms  Patient with history of chronic pain on MS Contin, bipolar disorder, asthma, obstructive sleep apnea, seronegative arthropathy, [facility]sided [facility]  She was admitted to the ICU for strokelike symptoms  She underwent thrombectomy with neurosurgery  Repeat imaging revealed superimposed petechial hemorrhage  Neurology plans for outpatient imaging and antiplatelet medication noted  She will continue with atorvastatin      She was seen in consultation with neurology and cardiology  She is noted to have EF of 30%, please review cardiology note for LifeVest discussion with patient and daughter  She has been transitioned from carvedilol to metoprolol  She will need outpatient follow-up with cardiology for further work-up  Next    Patient symptomatically improving hemodynamically stable and is deemed ready for discharge to SNF today  Arrangements to transfer out to SNF underway with case management    Kindly review the chart for details      Condition at Discharge: fair     Discharge Day Visit / Exam:     Subjective:      Comfortably sitting up in chair  Reports feeling better  Agreeable to discharge plan    Vitals: Blood Pressure: 119/77 (02/16/23 1603)  Pulse: (!) 110 (02/16/23 1603)  Temperature: 98 °F (36 7 °C) (02/16/23 0935)  Temp Source: Axillary (02/16/23 0935)  Respirations: 14 (02/16/23 1603)  Height: 5' 3" (160 cm) (02/16/23 1106)  Weight - Scale: 62 6 kg (138 lb) (02/16/23 1106)  SpO2: 96 % (02/16/23 1603)  Exam:   Physical Exam    Comfortably sitting up in chair  Neck supple  Lungs emphysematous  Diminished breath sounds  No additional sounds  Heart sounds S1 and S2 noted  Abdomen soft nontender  Awake obey simple commands  Mild dysarthria noted  No rash  No edema    Discharge instructions/Information to patient and family:   See after visit summary for information provided to patient and family  Discharge plan discussed with neurology, cardiology  Discharge plan discussed with the patient, updated daughter Niesha Watts follow-up with cardiology, neurology, primary care physician    Provisions for Follow-Up Care:  See after visit summary for information related to follow-up care and any pertinent home health orders  Planned Readmission: no     Discharge Statement:  I spent 45 minutes discharging the patient  This time was spent on the day of discharge  I had direct contact with the patient on the day of discharge  Greater than 50% of the total time was spent examining patient, answering all patient questions, arranging and discussing plan of care with patient as well as directly providing post-discharge instructions  Additional time then spent on discharge activities  Discharge Medications:  See after visit summary for reconciled discharge medications provided to patient and family        ** Please Note: This note has been constructed using a voice recognition system **

## 2023-02-17 NOTE — TELEPHONE ENCOUNTER
2/17/23- PT DISCHARGED TO [facility] [phone]  SPOKE TO CHARLI AND CONFIRMED 5/15/23 F/U APT

## 2023-02-17 NOTE — NURSING NOTE
Received a call from [facility] confirming they received the Morphine script  All questions answered   SD 2/16/2023

## 2023-02-21 ENCOUNTER — TELEPHONE (OUTPATIENT)
Dept: NEUROLOGY | Facility: CLINIC | Age: 68
End: 2023-02-21

## 2023-02-21 NOTE — TELEPHONE ENCOUNTER
Post CVA Discharge Follow Up  Hospitalization: 2/12/23-2/16/23    According to chart, patient discharged to [facility]  Called facility, [facility] [place]  She reports how the patient's nurse is unavailable at this time  She offered to help with the phone call  Advised how it was recommended for the patient to obtain a CT head on 2/23/23 after discharge from the hospital  Offered to fax script  She provided the fax number of [phone]  Will also fax stroke hospital follow up appointment as a reminder  Requested for the patient's nurse to call back when she is available  Provided this RN's contact information  Will await for a return phone call  Faxed script along with appointment details for stroke hospital follow up

## 2023-02-21 NOTE — TELEPHONE ENCOUNTER
Post CVA Discharge Follow Up  Hospitalization: 2/12/23-2/16/23     Returned the call to CHANEL Ortega answered the phone  She reports how CHANEL is taking her lunch now and will call back once available  Provided phone number

## 2023-02-21 NOTE — TELEPHONE ENCOUNTER
analisa caldera, returned call from [facility] regarding  [address] [facility] [address] 1955  best  [phone]

## 2023-02-22 ENCOUNTER — APPOINTMENT (INPATIENT)
Dept: RADIOLOGY | Facility: HOSPITAL | Age: 68
End: 2023-02-22

## 2023-02-22 ENCOUNTER — APPOINTMENT (EMERGENCY)
Dept: RADIOLOGY | Facility: HOSPITAL | Age: 68
End: 2023-02-22

## 2023-02-22 ENCOUNTER — APPOINTMENT (OUTPATIENT)
Dept: RADIOLOGY | Facility: HOSPITAL | Age: 68
End: 2023-02-22

## 2023-02-22 ENCOUNTER — HOSPITAL ENCOUNTER (INPATIENT)
Facility: HOSPITAL | Age: 68
LOS: 6 days | End: 2023-02-28
Attending: EMERGENCY MEDICINE | Admitting: STUDENT IN AN ORGANIZED HEALTH CARE EDUCATION/TRAINING PROGRAM

## 2023-02-22 DIAGNOSIS — Z86.73 HISTORY OF STROKE: ICD-10-CM

## 2023-02-22 DIAGNOSIS — E21.3 HYPERPARATHYROIDISM (HCC): ICD-10-CM

## 2023-02-22 DIAGNOSIS — R78.81 BACTEREMIA: ICD-10-CM

## 2023-02-22 DIAGNOSIS — I63.9 ACUTE CVA (CEREBROVASCULAR ACCIDENT) (HCC): ICD-10-CM

## 2023-02-22 DIAGNOSIS — I63.9 STROKE (HCC): ICD-10-CM

## 2023-02-22 DIAGNOSIS — M81.0 OSTEOPOROSIS: ICD-10-CM

## 2023-02-22 DIAGNOSIS — R41.0 CONFUSION: ICD-10-CM

## 2023-02-22 DIAGNOSIS — R41.82 ALTERED MENTAL STATUS: Primary | ICD-10-CM

## 2023-02-22 DIAGNOSIS — R10.9 ABDOMINAL PAIN: ICD-10-CM

## 2023-02-22 DIAGNOSIS — G93.41 ACUTE METABOLIC ENCEPHALOPATHY: ICD-10-CM

## 2023-02-22 DIAGNOSIS — E83.52 HYPERCALCEMIA: ICD-10-CM

## 2023-02-22 PROBLEM — L81.9 DISCOLORATION OF SKIN OF FOOT: Status: ACTIVE | Noted: 2023-02-22

## 2023-02-22 LAB
2HR DELTA HS TROPONIN: -1 NG/L
ALBUMIN SERPL BCP-MCNC: 3.2 G/DL (ref 3.5–5)
ALP SERPL-CCNC: 49 U/L (ref 46–116)
ALT SERPL W P-5'-P-CCNC: 20 U/L (ref 12–78)
AMMONIA PLAS-SCNC: <10 UMOL/L (ref 11–35)
ANION GAP SERPL CALCULATED.3IONS-SCNC: 2 MMOL/L (ref 4–13)
APAP SERPL-MCNC: <2 UG/ML (ref 10–20)
APTT PPP: 29 SECONDS (ref 23–37)
AST SERPL W P-5'-P-CCNC: 26 U/L (ref 5–45)
BASOPHILS # BLD AUTO: 0.02 THOUSANDS/ÂΜL (ref 0–0.1)
BASOPHILS NFR BLD AUTO: 0 % (ref 0–1)
BILIRUB SERPL-MCNC: 0.59 MG/DL (ref 0.2–1)
BUN SERPL-MCNC: 15 MG/DL (ref 5–25)
CALCIUM ALBUM COR SERPL-MCNC: 11.5 MG/DL (ref 8.3–10.1)
CALCIUM SERPL-MCNC: 10.9 MG/DL (ref 8.3–10.1)
CARDIAC TROPONIN I PNL SERPL HS: 15 NG/L
CARDIAC TROPONIN I PNL SERPL HS: 16 NG/L
CHLORIDE SERPL-SCNC: 111 MMOL/L (ref 96–108)
CO2 SERPL-SCNC: 29 MMOL/L (ref 21–32)
CREAT SERPL-MCNC: 0.93 MG/DL (ref 0.6–1.3)
EOSINOPHIL # BLD AUTO: 0.13 THOUSAND/ÂΜL (ref 0–0.61)
EOSINOPHIL NFR BLD AUTO: 2 % (ref 0–6)
ERYTHROCYTE [DISTWIDTH] IN BLOOD BY AUTOMATED COUNT: 14.2 % (ref 11.6–15.1)
ETHANOL SERPL-MCNC: <3 MG/DL (ref 0–3)
GFR SERPL CREATININE-BSD FRML MDRD: 63 ML/MIN/1.73SQ M
GLUCOSE SERPL-MCNC: 87 MG/DL (ref 65–140)
HCT VFR BLD AUTO: 41.4 % (ref 34.8–46.1)
HGB BLD-MCNC: 13.6 G/DL (ref 11.5–15.4)
IMM GRANULOCYTES # BLD AUTO: 0.03 THOUSAND/UL (ref 0–0.2)
IMM GRANULOCYTES NFR BLD AUTO: 0 % (ref 0–2)
INR PPP: 0.95 (ref 0.84–1.19)
LACTATE SERPL-SCNC: 1.4 MMOL/L (ref 0.5–2)
LIPASE SERPL-CCNC: 124 U/L (ref 73–393)
LYMPHOCYTES # BLD AUTO: 1.37 THOUSANDS/ÂΜL (ref 0.6–4.47)
LYMPHOCYTES NFR BLD AUTO: 18 % (ref 14–44)
MCH RBC QN AUTO: 31.7 PG (ref 26.8–34.3)
MCHC RBC AUTO-ENTMCNC: 32.9 G/DL (ref 31.4–37.4)
MCV RBC AUTO: 97 FL (ref 82–98)
MONOCYTES # BLD AUTO: 1.02 THOUSAND/ÂΜL (ref 0.17–1.22)
MONOCYTES NFR BLD AUTO: 13 % (ref 4–12)
NEUTROPHILS # BLD AUTO: 5.23 THOUSANDS/ÂΜL (ref 1.85–7.62)
NEUTS SEG NFR BLD AUTO: 67 % (ref 43–75)
NRBC BLD AUTO-RTO: 0 /100 WBCS
PLATELET # BLD AUTO: 248 THOUSANDS/UL (ref 149–390)
PLATELET # BLD AUTO: 270 THOUSANDS/UL (ref 149–390)
PMV BLD AUTO: 10.6 FL (ref 8.9–12.7)
PMV BLD AUTO: 10.8 FL (ref 8.9–12.7)
POTASSIUM SERPL-SCNC: 4.4 MMOL/L (ref 3.5–5.3)
PROCALCITONIN SERPL-MCNC: 0.06 NG/ML
PROT SERPL-MCNC: 6 G/DL (ref 6.4–8.4)
PROTHROMBIN TIME: 12.8 SECONDS (ref 11.6–14.5)
RBC # BLD AUTO: 4.29 MILLION/UL (ref 3.81–5.12)
SALICYLATES SERPL-MCNC: <3 MG/DL (ref 3–20)
SODIUM SERPL-SCNC: 142 MMOL/L (ref 135–147)
WBC # BLD AUTO: 7.8 THOUSAND/UL (ref 4.31–10.16)

## 2023-02-22 RX ORDER — BUPROPION HYDROCHLORIDE 100 MG/1
100 TABLET ORAL 2 TIMES DAILY
Status: DISCONTINUED | OUTPATIENT
Start: 2023-02-22 | End: 2023-02-28 | Stop reason: HOSPADM

## 2023-02-22 RX ORDER — METOPROLOL SUCCINATE 25 MG/1
25 TABLET, EXTENDED RELEASE ORAL 2 TIMES DAILY
Status: DISCONTINUED | OUTPATIENT
Start: 2023-02-22 | End: 2023-02-28 | Stop reason: HOSPADM

## 2023-02-22 RX ORDER — AMOXICILLIN 250 MG
1 CAPSULE ORAL 2 TIMES DAILY
Status: DISCONTINUED | OUTPATIENT
Start: 2023-02-22 | End: 2023-02-28 | Stop reason: HOSPADM

## 2023-02-22 RX ORDER — HYDROXYCHLOROQUINE SULFATE 200 MG/1
200 TABLET, FILM COATED ORAL
Status: DISCONTINUED | OUTPATIENT
Start: 2023-02-23 | End: 2023-02-28 | Stop reason: HOSPADM

## 2023-02-22 RX ORDER — NALOXONE HYDROCHLORIDE 1 MG/ML
INJECTION INTRAMUSCULAR; INTRAVENOUS; SUBCUTANEOUS
Status: COMPLETED
Start: 2023-02-22 | End: 2023-02-22

## 2023-02-22 RX ORDER — DULOXETIN HYDROCHLORIDE 60 MG/1
60 CAPSULE, DELAYED RELEASE ORAL 2 TIMES DAILY
Status: DISCONTINUED | OUTPATIENT
Start: 2023-02-22 | End: 2023-02-28 | Stop reason: HOSPADM

## 2023-02-22 RX ORDER — CEFAZOLIN SODIUM 2 G/50ML
2000 SOLUTION INTRAVENOUS EVERY 8 HOURS
Status: DISCONTINUED | OUTPATIENT
Start: 2023-02-22 | End: 2023-02-23

## 2023-02-22 RX ORDER — ATORVASTATIN CALCIUM 40 MG/1
40 TABLET, FILM COATED ORAL EVERY EVENING
Status: DISCONTINUED | OUTPATIENT
Start: 2023-02-22 | End: 2023-02-28 | Stop reason: HOSPADM

## 2023-02-22 RX ORDER — HYDROXYCHLOROQUINE SULFATE 200 MG/1
400 TABLET, FILM COATED ORAL 2 TIMES WEEKLY
Status: DISCONTINUED | OUTPATIENT
Start: 2023-02-23 | End: 2023-02-22

## 2023-02-22 RX ORDER — NALOXONE HYDROCHLORIDE 1 MG/ML
2 INJECTION INTRAMUSCULAR; INTRAVENOUS; SUBCUTANEOUS ONCE
Status: COMPLETED | OUTPATIENT
Start: 2023-02-22 | End: 2023-02-22

## 2023-02-22 RX ORDER — LORAZEPAM 2 MG/ML
0.5 INJECTION INTRAMUSCULAR ONCE
Status: COMPLETED | OUTPATIENT
Start: 2023-02-22 | End: 2023-02-22

## 2023-02-22 RX ORDER — SODIUM CHLORIDE 9 MG/ML
50 INJECTION, SOLUTION INTRAVENOUS CONTINUOUS
Status: DISPENSED | OUTPATIENT
Start: 2023-02-22 | End: 2023-02-23

## 2023-02-22 RX ORDER — MORPHINE SULFATE 15 MG/1
15 TABLET, FILM COATED, EXTENDED RELEASE ORAL 2 TIMES DAILY
Status: DISCONTINUED | OUTPATIENT
Start: 2023-02-22 | End: 2023-02-28 | Stop reason: HOSPADM

## 2023-02-22 RX ORDER — POLYETHYLENE GLYCOL 3350 17 G/17G
17 POWDER, FOR SOLUTION ORAL DAILY PRN
Status: DISCONTINUED | OUTPATIENT
Start: 2023-02-22 | End: 2023-02-28 | Stop reason: HOSPADM

## 2023-02-22 RX ORDER — ENOXAPARIN SODIUM 100 MG/ML
40 INJECTION SUBCUTANEOUS DAILY
Status: DISCONTINUED | OUTPATIENT
Start: 2023-02-23 | End: 2023-02-25

## 2023-02-22 RX ORDER — HYDROXYCHLOROQUINE SULFATE 200 MG/1
200 TABLET, FILM COATED ORAL
Status: DISCONTINUED | OUTPATIENT
Start: 2023-02-23 | End: 2023-02-22

## 2023-02-22 RX ORDER — GABAPENTIN 300 MG/1
300 CAPSULE ORAL 2 TIMES DAILY
Status: DISCONTINUED | OUTPATIENT
Start: 2023-02-22 | End: 2023-02-28 | Stop reason: HOSPADM

## 2023-02-22 RX ORDER — HYDROXYCHLOROQUINE SULFATE 200 MG/1
400 TABLET, FILM COATED ORAL 2 TIMES WEEKLY
Status: DISCONTINUED | OUTPATIENT
Start: 2023-02-25 | End: 2023-02-28 | Stop reason: HOSPADM

## 2023-02-22 RX ORDER — ALBUTEROL SULFATE 90 UG/1
2 AEROSOL, METERED RESPIRATORY (INHALATION) EVERY 6 HOURS PRN
Status: DISCONTINUED | OUTPATIENT
Start: 2023-02-22 | End: 2023-02-28 | Stop reason: HOSPADM

## 2023-02-22 RX ADMIN — CEFAZOLIN SODIUM 2000 MG: 2 SOLUTION INTRAVENOUS at 18:09

## 2023-02-22 RX ADMIN — SENNOSIDES AND DOCUSATE SODIUM 1 TABLET: 8.6; 5 TABLET ORAL at 22:03

## 2023-02-22 RX ADMIN — SODIUM CHLORIDE 50 ML/HR: 0.9 INJECTION, SOLUTION INTRAVENOUS at 20:10

## 2023-02-22 RX ADMIN — NALOXONE HYDROCHLORIDE 1 MG: 1 INJECTION PARENTERAL at 13:39

## 2023-02-22 RX ADMIN — GABAPENTIN 300 MG: 300 CAPSULE ORAL at 22:03

## 2023-02-22 RX ADMIN — METOPROLOL SUCCINATE 25 MG: 25 TABLET, EXTENDED RELEASE ORAL at 22:03

## 2023-02-22 RX ADMIN — LORAZEPAM 0.5 MG: 2 INJECTION INTRAMUSCULAR; INTRAVENOUS at 15:39

## 2023-02-22 RX ADMIN — NALOXONE HYDROCHLORIDE 1 MG: 1 INJECTION INTRAMUSCULAR; INTRAVENOUS; SUBCUTANEOUS at 13:39

## 2023-02-22 RX ADMIN — DULOXETINE HYDROCHLORIDE 60 MG: 60 CAPSULE, DELAYED RELEASE ORAL at 22:03

## 2023-02-22 RX ADMIN — ATORVASTATIN CALCIUM 40 MG: 40 TABLET, FILM COATED ORAL at 22:03

## 2023-02-22 RX ADMIN — LAMOTRIGINE 150 MG: 25 TABLET ORAL at 22:03

## 2023-02-22 NOTE — ASSESSMENT & PLAN NOTE
· Presents with AMS from outpatient facility recently admitted to [facility] for CVA where she underwent revascularization of a right m1 occlusion (Dr Bertha Devine 2/12/2023)   Had similar presentation to [facility] and was discharge 4/26/22 with concerns for non compliance with her meds resulting in hallucinations  She was seen by psych at this time and her meds were adjusted however there were concerns for opioid w/d at that time  · Presently patient is confused, tangential   Her eyes are rolled up but she does occasionally track examiner  Speech is dysarthric  She has frequent shaking/jerking movements   · UA pending   · CTH w/o abnormalities   · Other work up appears unremarkable w/ exception on calcium corrected 11 5  · Ammonia normal   · With LE cellulitis vs ischemia    Pt unable to articular if she is having LE pain but possibly contributing to AMS   · Concern for possible polypharmacy  · Will get neuro eval given recent stroke and ?seizure   · Routine EEG   · Neuro checks

## 2023-02-22 NOTE — ASSESSMENT & PLAN NOTE
Wt Readings from Last 3 Encounters:   02/16/23 62 6 kg (138 lb)   02/12/23 67 9 kg (149 lb 11 1 oz)   09/14/22 67 kg (147 lb 12 8 oz)     · Stress/stroke induced cardiomyopathy   · EF 25-30% on echo during prior admission   · Declined lifevest on prior admission due to stress  · Will place on tele while inpatient   · Maintained on metoprolol succinate (changed from coreg prior admission)   · Examines euvolemic    Not on diuretic   · Daily weights / I and O's

## 2023-02-22 NOTE — ASSESSMENT & PLAN NOTE
· Unclear etiology  Her daughter notes her skin is typically "blue" but currently her L foot is bright red and warm and her R foot is pale and cool which is not typical for her and is new   · Pt constantly moves and does not stay still to assess pulses, appears to have good cap refill    Appears tender on exam   · Check arterial duplex  · Possible cellulitis LLE will order empiric Ancef (allergic to PCN but tolerates Keflex)

## 2023-02-22 NOTE — CONSULTS
NEUROLOGY RESIDENCY CONSULT NOTE     Name: Nirmala Hastings   Age & Sex: 75 y o  female   MRN: [id number]  Unit/Bed#: ED 26   Encounter: [id number]  Length of Stay: 0    ASSESSMENT & PLAN     * Acute metabolic encephalopathy  Assessment & Plan  Assessment:   Nirmala Hastings is a 75 y o  female with a pertinent medical history consisting of recent CVA 2/12/22 ( R  MCA s/p M1 thrombectomy TICI 3)  Anxiety, Bipolar disorder, Chronic panic syndrome on MS contin, RA, HF  who initially presented to the [facility] ED on 2/22 with AMS   On arrival to the ED her initial SBP was 117/84 and tachycardiac to 125, Initial Labs consisting of Procalcitonin, troponin, Coma panel, Ethanol, CBC, CMP and Lipase were within the normal limits  CTH did no reveal any acute intracranial pathology  She received a dose of 0 5 Ativan and 2 mg Narcan in the ED  On exam patient is arousal to verbal and tactile stimuli  Follows simple commands  Plan:  · Recommend MRI brain to rule out new acute infarcts   · Routine EEG   · CTH negative   · Would recommend obtaining vessel imaging, initially recommended CTA however patient has a allergy (anaphalaxis) with exam improved will defer, however low threshold to repeat  · Repeat CTH negative, [facility] as recommended on day 10 (2/23) as previously recommended: when hemorrhage fully resolved radiographically, would then transition to anticoagulation (ideally Eliquis 5 mg BID)   · Continue atorvastatin 40 mg daily  · Goal euglycemia, normothermia  · Telemetry  · Frequent neurochecks  · PT/OT/speech  · STAT CT head with any acute change in neurologic exam  · Medical management supportive care per primary team, notify with changes      Recommendations for outpatient neurological follow up have yet to be determined       SUBJECTIVE     Reason for Consult / Principal Problem: AMS  Hx and PE limited by: AMS    HPI: Nirmala Hastings is a 75 y o   female with a pertinent medical history consisting of recent CVA 2/12/22 ( R  MCA s/p M1 thrombectomy TICI 3)  Anxiety, Bipolar disorder, Chronic panic syndrome on MS contin, RA, HF  who initially presented to the [facility] ED on 2/22 with AMS   On arrival to the ED her initial SBP was 117/84 and tachycardiac to 125, Initial Labs consisting of Procalcitonin, troponin, Coma panel, Ethanol, CBC, CMP and Lipase were within the normal limits  CTH did no reveal any acute intracranial pathology  She received a dose of 0 5 Ativan and 2 mg Narcan in the ED  To review patient was recently seen by the neurology service on her admission on 2/12  She was found to have a Right M1 occlusion and was transferred to [facility] for thrombectomy s/p TICI 3  Residual symptoms consisted of Dysarthria and mild Left sided weakness  The etiology of M1 occlusion was thought to be likely cardioembolic  She was recommended for Eliquis 5 mg BID after CT findings of hemorrhage had resolved     Per report, she presented with a similar presentation to [address] [place] [address] and was discharged on 4/26/2022 with concerns of non compliant with her medications resulting in hallucinations   She was seen by psych and concern was for opioid withdrawal      Inpatient consult to Neurology  Consult performed by: Melida Talbert MD  Consult ordered by: Leopoldo Section, PA-C          Historical Information   Past Medical History:   Diagnosis Date   • Anxiety    • Asthma    • Bipolar depression ([facility] [address] )    • Chronic narcotic dependence ([facility] [address] )    • Chronic pain    • Depression    • Enterovirus heart infection    • SHAR (obstructive sleep apnea)    • Osteoarthritis    • Peripheral neuropathy    • Plantar fasciitis of right foot    • Senile osteoporosis    • Seronegative arthropathy of multiple sites [facility])    • Undifferentiated connective tissue disease ([facility] [address] )      Past Surgical History:   Procedure Laterality Date   • BREAST IMPLANT     • FIXATION KYPHOPLASTY LUMBAR SPINE     • HYSTERECTOMY     • INCONTINENCE SURGERY N/A    • IR STROKE ALERT  2/12/2023   • NASAL SEPTOPLASTY W/ TURBINOPLASTY N/A    • RECTAL PROLAPSE REPAIR N/A    • REPAIR RECTOCELE     • RHINOPLASTY N/A      Social History   Social History     Substance and Sexual Activity   Alcohol Use Not Currently    Comment: quit     Social History     Substance and Sexual Activity   Drug Use Yes   • Types: Marijuana    Comment: has medical card     E-Cigarette/Vaping   • E-Cigarette Use Never User      E-Cigarette/Vaping Substances   • Nicotine No    • THC No    • CBD No    • Flavoring No    • Other No    • Unknown No      Social History     Tobacco Use   Smoking Status Former   Smokeless Tobacco Never     Family History:   Family History   Problem Relation Age of Onset   • No Known Problems Mother    • No Known Problems Father    • Arthritis Family    • Diabetes Family    • Stroke Family      Meds/Allergies   all current active meds have been reviewed, current meds:   Current Facility-Administered Medications   Medication Dose Route Frequency   • ceFAZolin (ANCEF) IVPB (premix in dextrose) 2,000 mg 50 mL  2,000 mg Intravenous Q8H    and PTA meds:   Prior to Admission Medications   Prescriptions Last Dose Informant Patient Reported? Taking?    DULOXETINE HCL PO   Yes No   Sig: Take 60 mg by mouth 2 (two) times a day   LamoTRIgine (LAMICTAL PO)   Yes No   Sig: Take 1 tablet by mouth daily at bedtime pt taking 150mg daily at bedtime    albuterol (PROVENTIL HFA,VENTOLIN HFA) 90 mcg/act inhaler   Yes No   Sig: Inhale 2 puffs every 6 (six) hours as needed for wheezing   atorvastatin (LIPITOR) 40 mg tablet   No No   Sig: Take 1 tablet (40 mg total) by mouth every evening   buPROPion (WELLBUTRIN) 100 mg tablet   Yes No   Sig: Take 100 mg by mouth 2 (two) times a day   gabapentin (NEURONTIN) 300 mg capsule   No No   Sig: Take 1 capsule (300 mg total) by mouth 2 (two) times a day   hydroxychloroquine (PLAQUENIL) 200 mg tablet   No No   Sig: Take 1 tablet (200mg) every Mon, Tues, Wed, Thurs, and Fri  Take 2 tablets (400mg) every Sat and Sun    metoprolol succinate (TOPROL-XL) 25 mg 24 hr tablet   No No   Sig: Take 1 tablet (25 mg total) by mouth 2 (two) times a day   morphine (MS CONTIN) 15 mg 12 hr tablet   No No   Sig: Take 1 tablet (15 mg total) by mouth 2 (two) times a day Max Daily Amount: 30 mg   polyethylene glycol (MIRALAX) 17 g packet   No No   Sig: Take 17 g by mouth daily as needed (Constipation)   raNITIdine HCl (ZANTAC PO)   Yes No   Sig: Take by mouth   senna-docusate sodium (SENOKOT S) 8 6-50 mg per tablet   No No   Sig: Take 1 tablet by mouth 2 (two) times a day      Facility-Administered Medications: None     Allergies   Allergen Reactions   • Dye [Iodinated Contrast Media] Anaphylaxis     IVP dye   • Bactrim [Sulfamethoxazole-Trimethoprim] Hives   • Erythromycin Hives   • Penicillins        Review of previous medical records was  completed  Review of Systems   Unable to perform ROS: Mental status change       OBJECTIVE     Patient ID: Lexx Trevino is a 75 y o  female  Vitals:   Vitals:    23 1340 23 1542 23 1554 23 1802   BP: 117/84 139/87  147/81   BP Location:  Left arm     Pulse: (!) 125 (!) 114  (!) 109   Resp:    Temp:   (!) 96 7 °F (35 9 °C)    TempSrc:   Rectal    SpO2: 97% 100%  97%      There is no height or weight on file to calculate BMI  No intake or output data in the 24 hours ending 23 1820    Temperature:   Temp (24hrs), Av 7 °F (35 9 °C), Min:96 7 °F (35 9 °C), Max:96 7 °F (35 9 °C)    Temperature: (!) 96 7 °F (35 9 °C)    Invasive Devices: Invasive Devices     Peripheral Intravenous Line  Duration           Peripheral IV 23 Left Antecubital <1 day    Peripheral IV 23 Right Antecubital <1 day                Physical Exam  Vitals and nursing note reviewed  HENT:      Head: Normocephalic and atraumatic  Nose: Nose normal    Eyes:      Extraocular Movements: Extraocular movements intact  Pupils: Pupils are equal, round, and reactive to light  Cardiovascular:      Rate and Rhythm: Tachycardia present  Skin:     General: Skin is dry  Findings: Bruising, erythema, lesion and rash present  Neurologic Exam     Mental Status   Level of consciousness: arousable by verbal stimuli ,  arousable by tactile stimuli  Oriented to name and location   Does not follow 2 step commands, able to participate in a neuro exam with constant stimulation      Cranial Nerves     CN III, IV, VI   Pupils are equal, round, and reactive to light  Patient initially could not look over to her right side however on repeat exam she is alert and able to cross midline, pupils are reactive   No nystagmus  Tongue midline  No facial droop appreciated      Motor Exam Antigravity in all 4 extremities appears to be 4/5 consistently thought out in bilateral lower extremities and 4+/5 on upper ext slightly weakner on L>R     Sensory Exam   Light touch normal             LABORATORY DATA     Labs: I have personally reviewed pertinent reports  Results from last 7 days   Lab Units 02/22/23  1405   WBC Thousand/uL 7 80   HEMOGLOBIN g/dL 13 6   HEMATOCRIT % 41 4   PLATELETS Thousands/uL 270   NEUTROS PCT % 67   MONOS PCT % 13*      Results from last 7 days   Lab Units 02/22/23  1400   POTASSIUM mmol/L 4 4   CHLORIDE mmol/L 111*   CO2 mmol/L 29   BUN mg/dL 15   CREATININE mg/dL 0 93   CALCIUM mg/dL 10 9*   ALK PHOS U/L 49   ALT U/L 20   AST U/L 26              Results from last 7 days   Lab Units 02/22/23  1400   INR  0 95   PTT seconds 29     Results from last 7 days   Lab Units 02/22/23  1400   LACTIC ACID mmol/L 1 4           IMAGING & DIAGNOSTIC TESTING     Radiology Results: I have personally reviewed pertinent reports  CT head without contrast   Final Result by Zoila Person MD (02/22 9400)      No acute intracranial abnormality                    Workstation performed: [id number]         MRI inpatient order    (Results Pending)       Other Diagnostic Testing: I have personally reviewed pertinent reports  ACTIVE MEDICATIONS     Current Facility-Administered Medications   Medication Dose Route Frequency   • ceFAZolin (ANCEF) IVPB (premix in dextrose) 2,000 mg 50 mL  2,000 mg Intravenous Q8H       Prior to Admission medications    Medication Sig Start Date End Date Taking? Authorizing Provider   albuterol (PROVENTIL HFA,VENTOLIN HFA) 90 mcg/act inhaler Inhale 2 puffs every 6 (six) hours as needed for wheezing    Historical Provider, MD   atorvastatin (LIPITOR) 40 mg tablet Take 1 tablet (40 mg total) by mouth every evening 2/16/23   Mylene Rivera MD   buPROPion [facility]) 100 mg tablet Take 100 mg by mouth 2 (two) times a day    Historical Provider, MD   DULOXETINE HCL PO Take 60 mg by mouth 2 (two) times a day    Megan Amado DO   gabapentin (NEURONTIN) 300 mg capsule Take 1 capsule (300 mg total) by mouth 2 (two) times a day 6/28/21   Laura Novak MD   hydroxychloroquine (PLAQUENIL) 200 mg tablet Take 1 tablet (200mg) every Mon, Tues, Wed, Thurs, and Fri   Take 2 tablets (400mg) every Sat and Sun  12/29/22 6/28/23  Vicenta Hugo MD   LamoTRIgine (LAMICTAL PO) Take 1 tablet by mouth daily at bedtime pt taking 150mg daily at bedtime     Historical Provider, MD   metoprolol succinate (TOPROL-XL) 25 mg 24 hr tablet Take 1 tablet (25 mg total) by mouth 2 (two) times a day 2/16/23   Mylene Rivera MD   morphine (MS CONTIN) 15 mg 12 hr tablet Take 1 tablet (15 mg total) by mouth 2 (two) times a day Max Daily Amount: 30 mg 2/16/23   Mylene Rivera MD   polyethylene glycol (MIRALAX) 17 g packet Take 17 g by mouth daily as needed (Constipation) 3/8/22   Tomás Hernandez PA-C   raNITIdine HCl (ZANTAC PO) Take by mouth    Historical Provider, MD   senna-docusate sodium (SENOKOT S) 8 6-50 mg per tablet Take 1 tablet by mouth 2 (two) times a day 3/8/22   Tomás Hernandez PA-C         CODE STATUS & ADVANCED DIRECTIVES     Code Status: Prior  Advance Directive and Living Will:      Power of :    POLST:        ==  MD Panchito Flynn [facility] Neurology Residency, PGY-4

## 2023-02-22 NOTE — ASSESSMENT & PLAN NOTE
Assessment:   Radha Hart is a 75 y o  female with a pertinent medical history consisting of recent CVA 2/12/22 ( R  MCA s/p M1 thrombectomy TICI 3)  Anxiety, Bipolar disorder, Chronic panic syndrome on MS contin, RA, HF  who initially presented to the [facility] ED on 2/22 with AMS   On arrival to the ED her initial SBP was 117/84 and tachycardiac to 125, Initial Labs consisting of Procalcitonin, troponin, Coma panel, Ethanol, CBC, CMP and Lipase were within the normal limits  CTH did no reveal any acute intracranial pathology  She received a dose of 0 5 Ativan and 2 mg Narcan in the ED  On exam patient is arousal to verbal and tactile stimuli  Follows simple commands       Plan:  · MRI brain: new small acute /more recent interval lacunar infarction in the right cerebral peduncle   · Routine EEG: ordered and pending   · Plan to repeat CTH on 2/5 if negative would recommend discontinung ASA 81 and starting ELiquis 5 mg BID   · Continue atorvastatin 40 mg daily  · Seizure precautions  · Ativan 2mg for seizure activity greater than 3-5 minutes   · Goal euglycemia, normothermia  · Telemetry  · Frequent neurochecks  · PT/OT/speech  · STAT CT head with any acute change in neurologic exam  · Medical management supportive care per primary team, notify with changes

## 2023-02-22 NOTE — ED PROVIDER NOTES
History  Chief Complaint   Patient presents with   • Altered Mental Status     Pt presents to Ed with history of stroke on      43-year-old female recent history of stroke with residual deficits symptoms , brought in by EMS from long-term facility for altered mental status since this morning  Patient is intermittently apneic and hypoxic during my examination  Able to answer any questions and follows commands  Prior to Admission Medications   Prescriptions Last Dose Informant Patient Reported? Taking? Artificial Saliva (SALIVA SUBSTITUTE MT)   Yes Yes   Sig: Apply 1 spray to the mouth or throat 2 (two) times a day as needed (dry mouth)   DULOXETINE HCL PO   Yes No   Sig: Take 60 mg by mouth 2 (two) times a day   Loratadine 10 MG CAPS   Yes Yes   Sig: Take 10 mg by mouth daily   albuterol (PROVENTIL HFA,VENTOLIN HFA) 90 mcg/act inhaler   Yes No   Sig: Inhale 2 puffs every 6 (six) hours as needed for wheezing   atorvastatin (LIPITOR) 40 mg tablet   No No   Sig: Take 1 tablet (40 mg total) by mouth every evening   buPROPion (WELLBUTRIN) 100 mg tablet   Yes No   Sig: Take 100 mg by mouth 2 (two) times a day   gabapentin (NEURONTIN) 300 mg capsule   No No   Sig: Take 1 capsule (300 mg total) by mouth 2 (two) times a day   hydroxychloroquine (PLAQUENIL) 200 mg tablet   No No   Sig: Take 1 tablet (200mg) every Mon, Tues, Wed, Thurs, and Fri   Take 2 tablets (400mg) every Sat and Sun    lamoTRIgine (LaMICtal) 150 MG tablet   Yes No   Sig: Take 1 tablet by mouth daily at bedtime pt taking 150mg daily at bedtime    melatonin 3 mg   Yes Yes   Sig: Take 6 mg by mouth daily at bedtime as needed (insomnia)   metoprolol succinate (TOPROL-XL) 25 mg 24 hr tablet   No No   Sig: Take 1 tablet (25 mg total) by mouth 2 (two) times a day   morphine (MS CONTIN) 15 mg 12 hr tablet   No No   Sig: Take 1 tablet (15 mg total) by mouth 2 (two) times a day Max Daily Amount: 30 mg   polyethylene glycol (MIRALAX) 17 g packet   No No Sig: Take 17 g by mouth daily as needed (Constipation)   senna-docusate sodium (SENOKOT S) 8 6-50 mg per tablet   No No   Sig: Take 1 tablet by mouth 2 (two) times a day      Facility-Administered Medications: None       Past Medical History:   Diagnosis Date   • Anxiety    • Asthma    • Bipolar depression ([facility] )    • Chronic narcotic dependence (HCC)    • Chronic pain    • Depression    • Enterovirus heart infection    • SHAR (obstructive sleep apnea)    • Osteoarthritis    • Peripheral neuropathy    • Plantar fasciitis of right foot    • Senile osteoporosis    • Seronegative arthropathy of multiple sites ([facility] )    • Undifferentiated connective tissue disease (HCC)        Past Surgical History:   Procedure Laterality Date   • BREAST IMPLANT     • FIXATION KYPHOPLASTY LUMBAR SPINE     • HYSTERECTOMY     • INCONTINENCE SURGERY N/A    • IR STROKE ALERT  2/12/2023   • NASAL SEPTOPLASTY W/ TURBINOPLASTY N/A    • RECTAL PROLAPSE REPAIR N/A    • REPAIR RECTOCELE     • RHINOPLASTY N/A        Family History   Problem Relation Age of Onset   • No Known Problems Mother    • No Known Problems Father    • Arthritis Family    • Diabetes Family    • Stroke Family      I have reviewed and agree with the history as documented      E-Cigarette/Vaping   • E-Cigarette Use Never User      E-Cigarette/Vaping Substances   • Nicotine No    • THC No    • CBD No    • Flavoring No    • Other No    • Unknown No      Social History     Tobacco Use   • Smoking status: Former   • Smokeless tobacco: Never   Vaping Use   • Vaping Use: Never used   Substance Use Topics   • Alcohol use: Not Currently     Comment: quit   • Drug use: Yes     Types: Marijuana     Comment: has medical card        Review of Systems   Unable to perform ROS: Mental status change       Physical Exam  ED Triage Vitals   Temperature Pulse Respirations Blood Pressure SpO2   02/22/23 1554 02/22/23 1340 02/22/23 1340 02/22/23 1340 02/22/23 1340   (!) 96 7 °F (35 9 °C) (!) 125 18 117/84 97 %      Temp Source Heart Rate Source Patient Position - Orthostatic VS BP Location FiO2 (%)   02/22/23 1554 02/22/23 1340 02/23/23 1220 02/22/23 1542 --   Rectal Monitor Lying Left arm       Pain Score       02/23/23 0000       No Pain             Orthostatic Vital Signs  Vitals:    02/27/23 2344 02/28/23 0237 02/28/23 0728 02/28/23 1105   BP: 114/70 120/75 120/75 117/75   Pulse: 89 103 (!) 106 (!) 110   Patient Position - Orthostatic VS: Sitting          Physical Exam  Vitals and nursing note reviewed  Constitutional:       General: She is not in acute distress  Appearance: She is well-developed  She is ill-appearing and toxic-appearing  HENT:      Head: Normocephalic and atraumatic  Eyes:      Conjunctiva/sclera: Conjunctivae normal    Cardiovascular:      Rate and Rhythm: Normal rate and regular rhythm  Heart sounds: No murmur heard  Pulmonary:      Effort: Pulmonary effort is normal  No respiratory distress  Breath sounds: Normal breath sounds  No wheezing, rhonchi or rales  Abdominal:      Palpations: Abdomen is soft  Tenderness: There is no abdominal tenderness  Musculoskeletal:         General: No swelling  Cervical back: Neck supple  Skin:     General: Skin is warm and dry  Capillary Refill: Capillary refill takes less than 2 seconds  Neurological:      Mental Status: She is lethargic, disoriented and confused  GCS: GCS eye subscore is 3  GCS verbal subscore is 3  GCS motor subscore is 5           ED Medications  Medications   albuterol (PROVENTIL HFA,VENTOLIN HFA) inhaler 2 puff (has no administration in time range)   atorvastatin (LIPITOR) tablet 40 mg (40 mg Oral Given 2/27/23 1729)   buPROPion [facility]) tablet 100 mg (100 mg Oral Given 2/28/23 0852)   DULoxetine (CYMBALTA) delayed release capsule 60 mg (60 mg Oral Given 2/28/23 0849)   gabapentin (NEURONTIN) capsule 300 mg (300 mg Oral Given 2/28/23 0850)   lamoTRIgine (LaMICtal) tablet 150 mg (150 mg Oral Given 2/28/23 0005)   metoprolol succinate (TOPROL-XL) 24 hr tablet 25 mg (25 mg Oral Given 2/28/23 0850)   morphine (MS CONTIN) ER tablet 15 mg (15 mg Oral Given 2/28/23 0850)   polyethylene glycol (MIRALAX) packet 17 g (has no administration in time range)   senna-docusate sodium (SENOKOT S) 8 6-50 mg per tablet 1 tablet (1 tablet Oral Given 2/28/23 0850)   sodium chloride 0 9 % infusion (0 mL/hr Intravenous Stopped 2/23/23 1209)   hydroxychloroquine (PLAQUENIL) tablet 400 mg (400 mg Oral Given 2/26/23 0859)   hydroxychloroquine (PLAQUENIL) tablet 200 mg (200 mg Oral Given 2/28/23 0851)   acetaminophen (TYLENOL) tablet 650 mg (0 mg Oral Return to [facility] 2/28/23 1116)   aluminum-magnesium hydroxide-simethicone (MYLANTA) oral suspension 30 mL (30 mL Oral Given 2/25/23 2111)   glycerin-hypromellose- (ARTIFICIAL TEARS) ophthalmic solution 1 drop (1 drop Both Eyes Given 2/25/23 1723)   apixaban (ELIQUIS) tablet 5 mg (5 mg Oral Given 2/28/23 0850)   ondansetron (ZOFRAN) injection 4 mg (4 mg Intravenous Given 2/27/23 1242)   naloxone (NARCAN) intranasal 2 mg (1 mg Nasal Given 2/22/23 1339)   LORazepam (ATIVAN) injection 0 5 mg (0 5 mg Intravenous Given 2/22/23 1539)   potassium chloride oral solution 40 mEq (40 mEq Oral Given 2/24/23 1504)   vancomycin (VANCOCIN) 1500 mg in sodium chloride 0 9% 250 mL IVPB (0 mg Intravenous Stopped 2/25/23 1232)   magnesium sulfate 2 g/50 mL IVPB (premix) 2 g (0 g Intravenous Stopped 2/25/23 1232)       Diagnostic Studies  Results Reviewed     Procedure Component Value Units Date/Time    Blood culture #1 [[id number]]  (Abnormal) Collected: 02/22/23 1405    Lab Status: Final result Specimen: Blood from Arm, Left Updated: 02/26/23 3285     Blood Culture Staphylococcus coagulase negative     Gram Stain Result Gram positive cocci in clusters    Narrative:      Susceptibility testing will not be performed as this organism, when isolated from a single set of blood cultures, represents probable skin sebastián contamination  Please call the Microbiology Laboratory within 5 days at [phone] if further workup is required  Blood Culture Identification Panel [[id number]] Collected: 02/22/23 1402    Lab Status: Final result Specimen: Blood from Arm, Right Updated: 02/26/23 0753     ALL TARGETS Not Detected    Blood culture #2 [[id number]]  (Abnormal)  (Susceptibility) Collected: 02/22/23 1402    Lab Status: Final result Specimen: Blood from Arm, Right Updated: 02/26/23 0753     Blood Culture Micrococcus luteus     Gram Stain Result Gram positive cocci in clusters    Narrative:      Susceptibility testing will not be performed as this organism, when isolated from a single set of blood cultures, represents probable skin sebastián contamination  Please call the Microbiology Laboratory within 5 days at [phone] if further workup is required        Susceptibility     Micrococcus luteus (1)     Antibiotic Interpretation Microscan Method Status    ZID Performed  Yes ANGELA Final                   HS Troponin I 2hr [[id number]]  (Normal) Collected: 02/22/23 1802    Lab Status: Final result Specimen: Blood from Arm, Left Updated: 02/22/23 1856     hs TnI 2hr 15 ng/L      Delta 2hr hsTnI -1 ng/L     Lipase [[id number]]  (Normal) Collected: 02/22/23 1405    Lab Status: Final result Specimen: Blood from Arm, Left Updated: 02/22/23 1524     Lipase 124 u/L     Comprehensive metabolic panel [[id number]]  (Abnormal) Collected: 02/22/23 1400    Lab Status: Final result Specimen: Blood from Arm, Right Updated: 02/22/23 1510     Sodium 142 mmol/L      Potassium 4 4 mmol/L      Chloride 111 mmol/L      CO2 29 mmol/L      ANION GAP 2 mmol/L      BUN 15 mg/dL      Creatinine 0 93 mg/dL      Glucose 87 mg/dL      Calcium 10 9 mg/dL      Corrected Calcium 11 5 mg/dL      AST 26 U/L      ALT 20 U/L      Alkaline Phosphatase 49 U/L      Total Protein 6 0 g/dL      Albumin 3 2 g/dL      Total Bilirubin 0 59 mg/dL eGFR 63 ml/min/1 73sq m     Narrative:      [place] guidelines for Chronic Kidney Disease (CKD):   •  Stage 1 with normal or high GFR (GFR > 90 mL/min/1 73 square meters)  •  Stage 2 Mild CKD (GFR = 60-89 mL/min/1 73 square meters)  •  Stage 3A Moderate CKD (GFR = 45-59 mL/min/1 73 square meters)  •  Stage 3B Moderate CKD (GFR = 30-44 mL/min/1 73 square meters)  •  Stage 4 Severe CKD (GFR = 15-29 mL/min/1 73 square meters)  •  Stage 5 End Stage CKD (GFR <15 mL/min/1 73 square meters)  Note: GFR calculation is accurate only with a steady state creatinine    Procalcitonin [[id number]]  (Normal) Collected: 02/22/23 1400    Lab Status: Final result Specimen: Blood from Arm, Right Updated: 02/22/23 1446     Procalcitonin 0 06 ng/ml     HS Troponin 0hr (reflex protocol) [[id number]]  (Normal) Collected: 02/22/23 1400    Lab Status: Final result Specimen: Blood from Arm, Right Updated: 02/22/23 1443     hs TnI 0hr 16 ng/L     Lactic acid [[id number]]  (Normal) Collected: 02/22/23 1400    Lab Status: Final result Specimen: Blood from Arm, Right Updated: 02/22/23 1439     LACTIC ACID 1 4 mmol/L     Narrative:      Result may be elevated if tourniquet was used during collection  Salicylate level [[id number]]  (Abnormal) Collected: 02/22/23 1400    Lab Status: Final result Specimen: Blood from Arm, Right Updated: 00/58/53 1414     Salicylate Lvl <3 mg/dL     Acetaminophen level-If concentration is detectable, please discuss with medical  on call   [[id number]]  (Abnormal) Collected: 02/22/23 1400    Lab Status: Final result Specimen: Blood from Arm, Right Updated: 02/22/23 1437     Acetaminophen Level <2 ug/mL     Ammonia [[id number]]  (Abnormal) Collected: 02/22/23 1400    Lab Status: Final result Specimen: Blood from Arm, Right Updated: 02/22/23 1434     Ammonia <10 umol/L     Protime-INR [[id number]]  (Normal) Collected: 02/22/23 1400    Lab Status: Final result Specimen: Blood from Arm, Right Updated: 02/22/23 1433     Protime 12 8 seconds      INR 0 95    APTT [[id number]]  (Normal) Collected: 02/22/23 1400    Lab Status: Final result Specimen: Blood from Arm, Right Updated: 02/22/23 1433     PTT 29 seconds     Ethanol [[id number]]  (Normal) Collected: 02/22/23 1400    Lab Status: Final result Specimen: Blood from Arm, Right Updated: 02/22/23 1433     Ethanol Lvl <3 mg/dL     CBC and differential [[id number]]  (Abnormal) Collected: 02/22/23 1405    Lab Status: Final result Specimen: Blood from Arm, Left Updated: 02/22/23 1414     WBC 7 80 Thousand/uL      RBC 4 29 Million/uL      Hemoglobin 13 6 g/dL      Hematocrit 41 4 %      MCV 97 fL      MCH 31 7 pg      MCHC 32 9 g/dL      RDW 14 2 %      MPV 10 6 fL      Platelets 520 Thousands/uL      nRBC 0 /100 WBCs      Neutrophils Relative 67 %      Immat GRANS % 0 %      Lymphocytes Relative 18 %      Monocytes Relative 13 %      Eosinophils Relative 2 %      Basophils Relative 0 %      Neutrophils Absolute 5 23 Thousands/µL      Immature Grans Absolute 0 03 Thousand/uL      Lymphocytes Absolute 1 37 Thousands/µL      Monocytes Absolute 1 02 Thousand/µL      Eosinophils Absolute 0 13 Thousand/µL      Basophils Absolute 0 02 Thousands/µL                  CT head wo contrast   Final Result by Kassidy Solorzano MD (02/25 0830)      No change  Stable evolving right MCA distribution infarct  Small recent infarct in the right cerebral peduncle identified on MRI is not well seen  Workstation performed: [id number]         VAS lower limb arterial duplex, complete bilateral   Final Result by James Smith MD (02/23 1343)      CT chest abdomen pelvis wo contrast   Final Result by Zacarias Tobin MD (02/23 0911)      1  Mild questionable colonic wall thickening, which may be related to underdistention versus mild colitis in the appropriate clinical setting        2   Small postprocedural hematoma measuring up to 2 cm adjacent to the right femoral vessels  3   No acute abnormality in the chest             Workstation performed: [id number]         MRI brain wo contrast   Final Result by Jory Daley MD (02/22 2219)         1  New small acute /more recent interval lacunar infarction in the right cerebral peduncle (series 4, image 14)  2   Extensive multifocal large, previously present right middle cerebral artery infarction which is evolving with superimposed areas of evolving hemorrhage likely related to hemorrhagic transformation of subacute right MCA infarction  No significant    mass effect  3   Mild, chronic microangiopathy  Workstation performed: [id number]         CT head without contrast   Final Result by Debby Hawkins MD (02/22 1440)      No acute intracranial abnormality  Workstation performed: [id number]               Procedures  Procedures      ED Course               Identification of Seniors at [address] [place] [address] Most Recent Value   (ISAR) Identification of Seniors at Risk    Before the illness or injury that brought you to the Emergency, did you need someone to help you on a regular basis? 1 Filed at: 02/22/2023 1342   In the last 24 hours, have you needed more help than usual? 1 Filed at: 02/22/2023 1342   Have you been hospitalized for one or more nights during the past 6 months? 1 Filed at: 02/22/2023 1342   In general, do you see well? 1 Filed at: 02/22/2023 1342   In general, do you have serious problems with your memory? 1 Filed at: 02/22/2023 1342   Do you take more than three different medications every day? 1 Filed at: 02/22/2023 1342   ISAR Score 6 Filed at: 02/22/2023 1342                              Medical Decision Making  60-year-old female recent history of stroke with residual deficits symptoms , brought in by EMS from long-term facility for altered mental status since this morning  Patient had mild improvement in mental status with Narcan    Abnormal neuro exam hemodynamically stable  Patient was evaluated with sepsis labs, cardiac work-up, CT head  No acute bleeding noted in the CT admitted to medicine for acute metabolic encephalopathy  Stable in the ED  Altered mental status: acute illness or injury  Confusion: acute illness or injury  History of stroke: acute illness or injury  Amount and/or Complexity of Data Reviewed  Labs: ordered  Radiology: ordered  Risk  Prescription drug management  Decision regarding hospitalization  Disposition  Final diagnoses: Altered mental status   Confusion   History of stroke     Time reflects when diagnosis was documented in both MDM as applicable and the Disposition within this note     Time User Action Codes Description Comment    2/22/2023  3:28 PM Laura Lynch Add [R41 82] Altered mental status     2/22/2023  3:28 PM ANDRESysPina glover Add [R41 0] Confusion     2/22/2023  3:28 PM Laura Mary Add [Z86 73] History of stroke     2/22/2023  4:27 PM Delray Hives Add [V11 33] Acute metabolic encephalopathy     2/22/2023  4:31 PM Delray Hives Add [I63 9] Stroke ([facility] )     2/23/2023  9:47 AM Esvin Furry Add [E83 52] Hypercalcemia     2/23/2023  3:52 PM [place] Furry Add [E21 3] Hyperparathyroidism ([facility] )     2/23/2023  4:19 PM Esvin Furry Add [I63 9] Acute CVA (cerebrovascular accident) ([facility] )     2/24/2023 12:57 PM Fraser Brothers [R78 81] Bacteremia     2/28/2023  1:38 PM CharliJacklyn Ricardo M Add [R10 9] Abdominal pain     2/28/2023  1:38 PM Luis Miguel Landing M Add [M81 0] Osteoporosis       ED Disposition     ED Disposition   Admit    Condition   Stable    Date/Time   Wed Feb 22, 2023  3:28 PM    Comment   Case was discussed with Angela Qiu and the patient's admission status was agreed to be Admission Status: observation status to the service of Dr Rocky Aquino             Follow-up Information     Follow up With Specialties Details Why Contact Info Additional [facility] [address] [facility] Schedule an appointment as soon as possible for a visit in 4 week(s) Office should call you to schedule follow-up, if you do not hear anything, please call to schedule   [address]  [phone] [facility] [address], [address] [place] [address], [address]    Natashaashwini Najera,  Family Medicine Schedule an appointment as soon as possible for a visit in 1 week(s)  [place] [address]   [address]             Current Discharge Medication List      START taking these medications    Details   acetaminophen (TYLENOL) 325 mg tablet Take 2 tablets (650 mg total) by mouth every 6 (six) hours as needed for mild pain, headaches or fever  Refills: 0    Associated Diagnoses: Abdominal pain      apixaban (ELIQUIS) 5 mg Take 1 tablet (5 mg total) by mouth 2 (two) times a day  Refills: 0    Associated Diagnoses: Acute CVA (ce[facility]bro[facility] accident) ([facility] )         CONTINUE these medications which have NOT CHANGED    Details   Artificial Saliva (SALIVA SUBSTITUTE MT) Apply 1 spray to the mouth or throat 2 (two) times a day as needed (dry mouth)      Loratadine 10 MG CAPS Take 10 mg by mouth daily      melatonin 3 mg Take 6 mg by mouth daily at bedtime as needed (insomnia)      albuterol (PROVENTIL HFA,VENTOLIN HFA) 90 mcg/act inhaler Inhale 2 puffs every 6 (six) hours as needed for wheezing      atorvastatin (LIPITOR) 40 mg tablet Take 1 tablet (40 mg total) by mouth every evening  Refills: 0    Associated Diagnoses: Stroke ([facility] )      buPROPion (WELLBUTRIN) 100 mg tablet Take 100 mg by mouth 2 (two) times a day      DULOXETINE HCL PO Take 60 mg by mouth 2 (two) times a day      gabapentin (NEURONTIN) 300 mg capsule Take 1 capsule (300 mg total) by mouth 2 (two) times a day  Qty: 60 capsule, Refills: 0    Associated Diagnoses: Chronic pain      hydroxychloroquine (PLAQUENIL) 200 mg tablet Take 1 tablet (200mg) every Mon, Tues, Wed, Thurs, and Fri  Take 2 tablets (400mg) every Sat and Sun  Qty: 40 tablet, Refills: 2    Associated Diagnoses: Rheumatoid arthritis (HCC)      lamoTRIgine (LaMICtal) 150 MG tablet Take 1 tablet by mouth daily at bedtime pt taking 150mg daily at bedtime       metoprolol succinate (TOPROL-XL) 25 mg 24 hr tablet Take 1 tablet (25 mg total) by mouth 2 (two) times a day  Refills: 0    Associated Diagnoses: Systolic heart failure, unspecified HF chronicity (HCC)      morphine (MS CONTIN) 15 mg 12 hr tablet Take 1 tablet (15 mg total) by mouth 2 (two) times a day Max Daily Amount: 30 mg  Qty: 5 tablet, Refills: 0    Associated Diagnoses: Chronic pain syndrome      polyethylene glycol (MIRALAX) 17 g packet Take 17 g by mouth daily as needed (Constipation)    Associated Diagnoses: Acute pain due to trauma; Closed fracture of multiple ribs of right side, initial encounter      senna-docusate sodium (SENOKOT S) 8 6-50 mg per tablet Take 1 tablet by mouth 2 (two) times a day    Associated Diagnoses: Acute pain due to trauma; Closed fracture of multiple ribs of right side, initial encounter           Outpatient Discharge Orders   Ambulatory referral to Endocrinology   Standing Status: Future Standing Exp  Date: 02/28/24      Discharge Diet     Discharge Condtion:  Stabilized     Patient Aware of Diagnosis: Yes     Free of Communicable Disease:   Yes     Speech Therapy Eval and Treat     Physical Therapy Eval And Treat     Occupational Therapy Eval and Treat     Activity:  Per Rehab Recommendations       PDMP Review       Value Time User    PDMP Reviewed  Yes 2/22/2023  4:00 PM Belén Kline PA-C           ED Provider  Attending physically available and evaluated Tesfaye La GUZMÁN managed the patient along with the ED Attending      Electronically Signed by         Edmond Langford DO  02/28/23 7008

## 2023-02-22 NOTE — ED PROCEDURE NOTE
PROCEDURE  CriticalCare Time  Performed by: Jigna Simmons DO  Authorized by: Jigna Simmons DO     Critical care provider statement:     Critical care time (minutes):  79    Critical care time was exclusive of:  Separately billable procedures and treating other patients and teaching time    Critical care was necessary to treat or prevent imminent or life-threatening deterioration of the following conditions:  CNS failure or compromise and respiratory failure (Respiratory just pression as well as altered mental status requiring Narcan administration IV )    Critical care was time spent personally by me on the following activities:  Blood draw for specimens, obtaining history from patient or surrogate, development of treatment plan with patient or surrogate, evaluation of patient's response to treatment, examination of patient, ordering and performing treatments and interventions, ordering and review of laboratory studies, ordering and review of radiographic studies, re-evaluation of patient's condition and review of old charts  Comments:      Upon my evaluation, this patient has a high probability of imminent or life-threatening deterioration due to Respiratory just pression as well as altered mental status requiring Narcan administration IV  which required my direct attention, intervention, and personal management      I have personally provided 70 minutes of critical care time, exclusive of procedures, teaching, and any prior time recorded by providers other than myself  Time includes review of laboratory data, radiology results, discussion with consultants, and monitoring for potential decompensation                Jigna Simmons DO  02/22/23 2760

## 2023-02-22 NOTE — ASSESSMENT & PLAN NOTE
· Pt endorses abdominal pain, nausea  · Given AMS and unreliable exam will check CT c/a/p for further evaluation

## 2023-02-22 NOTE — H&P
[address] [facility]  H&P- Zondra Apley 1955, 75 y o  female MRN: [id number]  Unit/Bed#: ED 26 Encounter: [id number]  Primary Care Provider: Jaydon Zendejas DO   Date and time admitted to hospital: 2/22/2023  1:16 PM    * Acute metabolic encephalopathy  Assessment & Plan  · Presents with AMS from outpatient facility recently admitted to [facility] for CVA where she underwent revascularization of a right m1 occlusion (Dr Etienne Garcia 2/12/2023)   Had similar presentation to [address] [place] [address] and was discharge 4/26/22 with concerns for non compliance with her meds resulting in hallucinations  She was seen by psych at this time and her meds were adjusted however there were concerns for opioid w/d at that time  · Presently patient is confused, tangential   Her eyes are rolled up but she does occasionally track examiner  Speech is dysarthric  She has frequent shaking/jerking movements   · UA pending   · CTH w/o abnormalities   · Other work up appears unremarkable w/ exception on calcium corrected 11 5  · Ammonia normal   · With LE cellulitis vs ischemia  Pt unable to articular if she is having LE pain but possibly contributing to AMS   · Concern for possible polypharmacy  · Will get neuro eval given recent stroke and ?seizure   · Routine EEG   · Neuro checks      Abdominal pain  Assessment & Plan  · Pt endorses abdominal pain, nausea  · Given AMS and unreliable exam will check CT c/a/p for further evaluation     Discoloration of skin of foot  Assessment & Plan  · Unclear etiology  Her daughter notes her skin is typically "blue" but currently her L foot is bright red and warm and her R foot is pale and cool which is not typical for her and is new   · Pt constantly moves and does not stay still to assess pulses, appears to have good cap refill    Appears tender on exam   · Check arterial duplex  · Possible cellulitis LLE will order empiric Ancef (allergic to PCN but tolerates Keflex) Hypercalcemia  Assessment & Plan  · POA with corrected calcium 11 5  · With mild chronic elevations in the past   · Recheck tomorrow and consider additional work up    HFrEF (heart failure with reduced ejection fraction) ([facility])  Assessment & Plan  Wt Readings from Last 3 Encounters:   02/16/23 62 6 kg (138 lb)   02/12/23 67 9 kg (149 lb 11 1 oz)   09/14/22 67 kg (147 lb 12 8 oz)     · Stress/stroke induced cardiomyopathy   · EF 25-30% on echo during prior admission   · Declined lifevest on prior admission due to stress  · Will place on tele while inpatient   · Maintained on metoprolol succinate (changed from coreg prior admission)   · Examines euvolemic  Not on diuretic   · Daily weights / I and O's     HTN (hypertension)  Assessment & Plan  · Maintain on metoprolol succinate 25 mg BID   · Will continue for now     Rheumatoid arthritis ([facility] )  Assessment & Plan  · Noted and follows up with rheum outpatient   · Continue plaquenil     Bipolar depression ([facility] )  Assessment & Plan  · On buproprion, duloxetine, as well as Lamictal   · Check lamictal level     Chronic pain syndrome  Assessment & Plan  · Noted history   · PDMP reviewed on MS contin 15 mg BID     VTE Pharmacologic Prophylaxis: VTE Score: 7 High Risk (Score >/= 5) - Pharmacological DVT Prophylaxis Ordered: heparin  Sequential Compression Devices Ordered  Code Status: Prior level 1 full   Discussion with family: Updated  (daughter) at bedside  Anticipated Length of Stay: Patient will be admitted on an observation basis with an anticipated length of stay of less than 2 midnights secondary to alteration in mental status  will need work up        Total Time Spent on Date of Encounter in care of patient: 65 minutes This time was spent on one or more of the following: performing physical exam; counseling and coordination of care; obtaining or reviewing history; documenting in the medical record; reviewing/ordering tests, medications or procedures; communicating with other healthcare professionals and discussing with patient's family/caregivers  Chief Complaint: AMS     History of Present Illness:  Corby Quinn is a 75 y o  female with a PMH of anxiety, bipolar disorder, chronic pain syndrome on MS Contin, CVA status post vascularization of M1 occlusion, rheumatoid arthritis, stress/stroke induced heart failure with reduced ejection fraction of 25 - 30% not on LifeVest who presents with alteration in mental status  Her daughter notes that Monday night she appeared to be "off" and has since declined  Presently pt appears somewhat confused, she knows where she is and knows she just had a stroke however is tangential and is talking about going diving  She has several jerking movements of her extremities/whole body throughout exam which are new  Her b/l feet are discolored  Daughter reports since her stroke she has L sided weakness and dysarthria  Pt hx limited given dysarthria, AMS  She endorses back pain (chronic), abdominal pain and nausea  She denies SOB  She does not answer reliably when asked if she has pain of her feet/legs  Review of Systems:  Review of Systems   Constitutional: Positive for activity change and appetite change  Negative for fever  HENT: Negative  Eyes: Negative  Respiratory: Negative  Cardiovascular: Negative  Gastrointestinal: Positive for abdominal pain and nausea  Endocrine: Negative  Genitourinary: Negative  Musculoskeletal: Positive for back pain  Skin: Positive for color change  Allergic/Immunologic: Negative  Neurological: Positive for tremors, speech difficulty and weakness  Psychiatric/Behavioral: Positive for agitation and confusion  The patient is nervous/anxious          Past Medical and Surgical History:   Past Medical History:   Diagnosis Date   • Anxiety    • Asthma    • Bipolar depression ([facility] [address] )    • [facility] dependence [facility]    • Chronic pain • Depression    • Enterovirus heart infection    • SHAR (obstructive sleep apnea)    • Osteoarthritis    • Peripheral neuropathy    • Plantar fasciitis of right foot    • Senile osteoporosis    • Seronegative arthropathy of multiple sites [facility])    • Undifferentiated connective tissue disease ([facility] [address] )        Past Surgical History:   Procedure Laterality Date   • BREAST IMPLANT     • FIXATION KYPHOPLASTY LUMBAR SPINE     • HYSTERECTOMY     • INCONTINENCE SURGERY N/A    • IR STROKE ALERT  2/12/2023   • NASAL SEPTOPLASTY W/ TURBINOPLASTY N/A    • RECTAL PROLAPSE REPAIR N/A    • REPAIR RECTOCELE     • RHINOPLASTY N/A        Meds/Allergies:  Prior to Admission medications    Medication Sig Start Date End Date Taking? Authorizing Provider   albuterol (PROVENTIL HFA,VENTOLIN HFA) 90 mcg/act inhaler Inhale 2 puffs every 6 (six) hours as needed for wheezing    Historical Provider, MD   atorvastatin (LIPITOR) 40 mg tablet Take 1 tablet (40 mg total) by mouth every evening 2/16/23   Jose Rafael Carbajal MD   buPROPion [facility]) 100 mg tablet Take 100 mg by mouth 2 (two) times a day    Historical Provider, MD   DULOXETINE HCL PO Take 60 mg by mouth 2 (two) times a day    Megan Amado DO   gabapentin (NEURONTIN) 300 mg capsule Take 1 capsule (300 mg total) by mouth 2 (two) times a day 6/28/21   Tony Cardenas MD   hydroxychloroquine (PLAQUENIL) 200 mg tablet Take 1 tablet (200mg) every Mon, Tues, Wed, Thurs, and Fri   Take 2 tablets (400mg) every Sat and Sun  12/29/22 6/28/23  Sabrina Najjar, MD   LamoTRIgine (LAMICTAL PO) Take 1 tablet by mouth daily at bedtime pt taking 150mg daily at bedtime     Historical Provider, MD   metoprolol succinate (TOPROL-XL) 25 mg 24 hr tablet Take 1 tablet (25 mg total) by mouth 2 (two) times a day 2/16/23   Jose Rafael Carbajal MD   morphine (MS CONTIN) 15 mg 12 hr tablet Take 1 tablet (15 mg total) by mouth 2 (two) times a day Max Daily Amount: 30 mg 2/16/23   Jose Rafael Carbajal MD polyethylene glycol (MIRALAX) 17 g packet Take 17 g by mouth daily as needed (Constipation) 3/8/22   Genna Joseph PA-C   raNITIdine HCl (ZANTAC PO) Take by mouth    Historical Provider, MD   senna-docusate sodium (SENOKOT S) 8 6-50 mg per tablet Take 1 tablet by mouth 2 (two) times a day 3/8/22   Genna Joseph PA-C     I have reviewed home medications using recent Epic encounter  Allergies: Allergies   Allergen Reactions   • Dye [Iodinated Contrast Media] Anaphylaxis     IVP dye   • Bactrim [Sulfamethoxazole-Trimethoprim] Hives   • Erythromycin Hives   • Penicillins        Social History:  Marital Status:    Occupation: unknown   Patient Pre-hospital Living Situation: Home  Patient Pre-hospital Level of Mobility: walks  Patient Pre-hospital Diet Restrictions: cardiac   Substance Use History:   Social History     Substance and Sexual Activity   Alcohol Use Not Currently    Comment: quit     Social History     Tobacco Use   Smoking Status Former   Smokeless Tobacco Never     Social History     Substance and Sexual Activity   Drug Use Yes   • Types: Marijuana    Comment: has medical card       Family History:  Family History   Problem Relation Age of Onset   • No Known Problems Mother    • No Known Problems Father    • Arthritis Family    • Diabetes Family    • Stroke Family        Physical Exam:     Vitals:   Blood Pressure: 139/87 (02/22/23 1542)  Pulse: (!) 114 (02/22/23 1542)  Respirations: 18 (02/22/23 1542)  SpO2: 100 % (02/22/23 1542)    Physical Exam  Vitals reviewed  Constitutional:       Comments: Frequent jerking/shaking movements   HENT:      Head: Normocephalic and atraumatic  Mouth/Throat:      Mouth: Mucous membranes are dry     Eyes:      Comments: Eyes are rolled upward but she does occasionally track examiner when spoken to    Cardiovascular:      Comments: Pt keeps talking throughout exam, unable to auscultate  Pulmonary:      Effort: Pulmonary effort is normal  No respiratory distress  Breath sounds: Rhonchi present  Abdominal:      General: Bowel sounds are normal  There is no distension  Palpations: Abdomen is soft  Tenderness: There is no abdominal tenderness  Musculoskeletal:      Cervical back: Normal range of motion  Comments: Diffuse jerking movements   Neurological:      Mental Status: She is alert  Comments: Dysarthria, L sided weakness    Psychiatric:         Mood and Affect: Mood is anxious  Comments: tangential         Additional Data:     Lab Results:  Results from last 7 days   Lab Units 02/22/23  1405   WBC Thousand/uL 7 80   HEMOGLOBIN g/dL 13 6   HEMATOCRIT % 41 4   PLATELETS Thousands/uL 270   NEUTROS PCT % 67   LYMPHS PCT % 18   MONOS PCT % 13*   EOS PCT % 2     Results from last 7 days   Lab Units 02/22/23  1400   SODIUM mmol/L 142   POTASSIUM mmol/L 4 4   CHLORIDE mmol/L 111*   CO2 mmol/L 29   BUN mg/dL 15   CREATININE mg/dL 0 93   ANION GAP mmol/L 2*   CALCIUM mg/dL 10 9*   ALBUMIN g/dL 3 2*   TOTAL BILIRUBIN mg/dL 0 59   ALK PHOS U/L 49   ALT U/L 20   AST U/L 26   GLUCOSE RANDOM mg/dL 87     Results from last 7 days   Lab Units 02/22/23  1400   INR  0 95             Results from last 7 days   Lab Units 02/22/23  1400   LACTIC ACID mmol/L 1 4   PROCALCITONIN ng/ml 0 06       Lines/Drains:  Invasive Devices     Peripheral Intravenous Line  Duration           Peripheral IV 02/22/23 Left Antecubital <1 day    Peripheral IV 02/22/23 Right Antecubital <1 day                    Imaging: Reviewed radiology reports from this admission including: CT head  CT head without contrast   Final Result by Vernell Bunch MD (02/22 1440)      No acute intracranial abnormality  Workstation performed: [id number]             EKG and Other Studies Reviewed on Admission:   · EKG: NSR  HR 97     ** Please Note: This note has been constructed using a voice recognition system   **

## 2023-02-22 NOTE — ASSESSMENT & PLAN NOTE
· POA with corrected calcium 11 5  · With mild chronic elevations in the past   · Recheck tomorrow and consider additional work up

## 2023-02-22 NOTE — ED ATTENDING ATTESTATION
2/22/2023  IBlossom DO, saw and evaluated the patient  I have discussed the patient with the resident/non-physician practitioner and agree with the resident's/non-physician practitioner's findings, Plan of Care, and MDM as documented in the resident's/non-physician practitioner's note, except where noted  All available labs and Radiology studies were reviewed  I was present for key portions of any procedure(s) performed by the resident/non-physician practitioner and I was immediately available to provide assistance  At this point I agree with the current assessment done in the Emergency Department  I have conducted an independent evaluation of this patient a history and physical is as follows:  [facility]  ED Course as of 02/22/23 1602   Wed Feb 22, 2023   1349 2 mg Narcan given to the patient with improvement of mental status, blood pressure elevated and patient has now communicated with us no evidence of apnea after that  She initially presented with noted periods of significant apnea  Noted to be hypoxic as well  Placed on oxygen supplementation with improvement of respiratory status after dose of Narcan  200 Spoke with daughter at bedside  Informed that the patient is not at her normal baseline  Recent stroke on the 12th of this month was admitted and currently at [facility] rehab,    Patient noted to not be well for the last 2 days per the daughter  Likely significant dehydration led to buildup of morphine as well as patient getting oxycodone as needed for pain  Patient now with some mild anxiety and withdrawal symptoms we will give a dose of Ativan at this point time  Patient on chronic morphine will admit             All labs reviewed and utilized in the medical decision making process    All radiology studies independently viewed by me and interpreted by the radiologist     Clinical Impression:    Final diagnoses:    Altered mental status   Confusion   History of stroke         ED Course  ED Course as of 02/22/23 1602   Wed Feb 22, 2023   1349 2 mg Narcan given to the patient with improvement of mental status, blood pressure elevated and patient has now communicated with us no evidence of apnea after that  She initially presented with noted periods of significant apnea  Noted to be hypoxic as well  Placed on oxygen supplementation with improvement of respiratory status after dose of Narcan  200 Spoke with daughter at bedside  Informed that the patient is not at her normal baseline  Recent stroke on the 12th of this month was admitted and currently at [facility] rehab,    Patient noted to not be well for the last 2 days per the daughter  Likely significant dehydration led to buildup of morphine as well as patient getting oxycodone as needed for pain  Patient now with some mild anxiety and withdrawal symptoms we will give a dose of Ativan at this point time    Patient on chronic morphine will admit         Critical Care Time  Procedures

## 2023-02-23 ENCOUNTER — APPOINTMENT (INPATIENT)
Dept: NON INVASIVE DIAGNOSTICS | Facility: HOSPITAL | Age: 68
End: 2023-02-23

## 2023-02-23 ENCOUNTER — APPOINTMENT (INPATIENT)
Dept: NEUROLOGY | Facility: CLINIC | Age: 68
End: 2023-02-23

## 2023-02-23 PROBLEM — I63.9 ACUTE CVA (CEREBROVASCULAR ACCIDENT) (HCC): Status: ACTIVE | Noted: 2023-02-23

## 2023-02-23 LAB
25(OH)D3 SERPL-MCNC: 41.7 NG/ML (ref 30–100)
ALBUMIN SERPL BCP-MCNC: 3.1 G/DL (ref 3.5–5)
ALP SERPL-CCNC: 48 U/L (ref 46–116)
ALT SERPL W P-5'-P-CCNC: 19 U/L (ref 12–78)
ANION GAP SERPL CALCULATED.3IONS-SCNC: 4 MMOL/L (ref 4–13)
AST SERPL W P-5'-P-CCNC: 22 U/L (ref 5–45)
ATRIAL RATE: 97 BPM
BASOPHILS # BLD AUTO: 0.03 THOUSANDS/ÂΜL (ref 0–0.1)
BASOPHILS NFR BLD AUTO: 0 % (ref 0–1)
BILIRUB SERPL-MCNC: 0.54 MG/DL (ref 0.2–1)
BUN SERPL-MCNC: 13 MG/DL (ref 5–25)
CA-I BLD-SCNC: 1.33 MMOL/L (ref 1.12–1.32)
CALCIUM ALBUM COR SERPL-MCNC: 11.8 MG/DL (ref 8.3–10.1)
CALCIUM SERPL-MCNC: 11.1 MG/DL (ref 8.3–10.1)
CHLORIDE SERPL-SCNC: 112 MMOL/L (ref 96–108)
CO2 SERPL-SCNC: 23 MMOL/L (ref 21–32)
CREAT SERPL-MCNC: 0.84 MG/DL (ref 0.6–1.3)
EOSINOPHIL # BLD AUTO: 0.05 THOUSAND/ÂΜL (ref 0–0.61)
EOSINOPHIL NFR BLD AUTO: 1 % (ref 0–6)
ERYTHROCYTE [DISTWIDTH] IN BLOOD BY AUTOMATED COUNT: 14 % (ref 11.6–15.1)
GFR SERPL CREATININE-BSD FRML MDRD: 71 ML/MIN/1.73SQ M
GLUCOSE SERPL-MCNC: 81 MG/DL (ref 65–140)
HCT VFR BLD AUTO: 43.8 % (ref 34.8–46.1)
HGB BLD-MCNC: 14.3 G/DL (ref 11.5–15.4)
IMM GRANULOCYTES # BLD AUTO: 0.03 THOUSAND/UL (ref 0–0.2)
IMM GRANULOCYTES NFR BLD AUTO: 0 % (ref 0–2)
LYMPHOCYTES # BLD AUTO: 1.03 THOUSANDS/ÂΜL (ref 0.6–4.47)
LYMPHOCYTES NFR BLD AUTO: 14 % (ref 14–44)
MCH RBC QN AUTO: 31.6 PG (ref 26.8–34.3)
MCHC RBC AUTO-ENTMCNC: 32.6 G/DL (ref 31.4–37.4)
MCV RBC AUTO: 97 FL (ref 82–98)
MONOCYTES # BLD AUTO: 0.75 THOUSAND/ÂΜL (ref 0.17–1.22)
MONOCYTES NFR BLD AUTO: 10 % (ref 4–12)
NEUTROPHILS # BLD AUTO: 5.31 THOUSANDS/ÂΜL (ref 1.85–7.62)
NEUTS SEG NFR BLD AUTO: 75 % (ref 43–75)
NRBC BLD AUTO-RTO: 0 /100 WBCS
P AXIS: 53 DEGREES
PLATELET # BLD AUTO: 261 THOUSANDS/UL (ref 149–390)
PMV BLD AUTO: 11.3 FL (ref 8.9–12.7)
POTASSIUM SERPL-SCNC: 4.1 MMOL/L (ref 3.5–5.3)
PR INTERVAL: 200 MS
PROT SERPL-MCNC: 6.3 G/DL (ref 6.4–8.4)
PTH-INTACT SERPL-MCNC: 65.4 PG/ML (ref 18.4–80.1)
QRS AXIS: 10 DEGREES
QRSD INTERVAL: 100 MS
QT INTERVAL: 316 MS
QTC INTERVAL: 401 MS
RBC # BLD AUTO: 4.53 MILLION/UL (ref 3.81–5.12)
SODIUM SERPL-SCNC: 139 MMOL/L (ref 135–147)
T WAVE AXIS: 116 DEGREES
VENTRICULAR RATE: 97 BPM
WBC # BLD AUTO: 7.2 THOUSAND/UL (ref 4.31–10.16)

## 2023-02-23 RX ORDER — ASPIRIN 81 MG/1
81 TABLET ORAL DAILY
Status: DISCONTINUED | OUTPATIENT
Start: 2023-02-23 | End: 2023-02-25

## 2023-02-23 RX ADMIN — GABAPENTIN 300 MG: 300 CAPSULE ORAL at 17:46

## 2023-02-23 RX ADMIN — CEFAZOLIN SODIUM 2000 MG: 2 SOLUTION INTRAVENOUS at 15:46

## 2023-02-23 RX ADMIN — CEFAZOLIN SODIUM 2000 MG: 2 SOLUTION INTRAVENOUS at 00:01

## 2023-02-23 RX ADMIN — ENOXAPARIN SODIUM 40 MG: 40 INJECTION SUBCUTANEOUS at 08:07

## 2023-02-23 RX ADMIN — MORPHINE SULFATE 15 MG: 15 TABLET, FILM COATED, EXTENDED RELEASE ORAL at 17:46

## 2023-02-23 RX ADMIN — BUPROPION HYDROCHLORIDE TABLETS 100 MG: 100 TABLET, FILM COATED ORAL at 17:46

## 2023-02-23 RX ADMIN — DULOXETINE HYDROCHLORIDE 60 MG: 60 CAPSULE, DELAYED RELEASE ORAL at 21:27

## 2023-02-23 RX ADMIN — ATORVASTATIN CALCIUM 40 MG: 40 TABLET, FILM COATED ORAL at 17:46

## 2023-02-23 RX ADMIN — CEFAZOLIN SODIUM 2000 MG: 2 SOLUTION INTRAVENOUS at 08:07

## 2023-02-23 RX ADMIN — LAMOTRIGINE 150 MG: 25 TABLET ORAL at 21:27

## 2023-02-23 RX ADMIN — METOPROLOL SUCCINATE 25 MG: 25 TABLET, EXTENDED RELEASE ORAL at 17:46

## 2023-02-23 NOTE — ASSESSMENT & PLAN NOTE
· POA and has had intermittent elevations in the past  · Appreciate input per nephrology   Non suppressed PTH thus concern for primary hyperparathyroidism  · Gentle fluid hydration given underlying low ef CHF  · Will consult endocrinology  · S/p CT C/A/P no occult mass to suggest underlying malignancy

## 2023-02-23 NOTE — PROGRESS NOTES
NEUROLOGY RESIDENCY PROGRESS NOTE     Name: Lexx Trevino   Age & Sex: 75 y o  female   MRN: [id number]  Unit/Bed#: [facility] 628-01   Encounter: [id number]    MEDICAL STUDENT NOTE  NOT TO BE USED FOR MEDICAL-LEGAL PURPOSES OR DECISION MAKING    ASSESSMENT & PLAN     * Acute metabolic encephalopathy  Assessment & Plan  Assessment:   Lexx Trevino is a 75 y o  female with a pertinent medical history consisting of recent CVA 2/12/22 ( R  MCA s/p M1 thrombectomy TICI 3)  Anxiety, Bipolar disorder, Chronic panic syndrome on MS contin, RA, HF  who initially presented to the [facility] ED on 2/22 with AMS   On arrival to the ED her initial SBP was 117/84 and tachycardiac to 125, Initial Labs consisting of Procalcitonin, troponin, Coma panel, Ethanol, CBC, CMP and Lipase were within the normal limits  CTH did no reveal any acute intracranial pathology  She received a dose of 0 5 Ativan and 2 mg Narcan in the ED  On exam patient is arousal to verbal and tactile stimuli  Follows simple commands  Plan:  · Recommend MRI brain to rule out new acute infarcts   · Routine EEG   · CTH negative   · Would recommend obtaining vessel imaging, initially recommended CTA however patient has a allergy (anaphalaxis) with exam improved will defer, however low threshold to repeat  · Repeat CTH negative, would consider starting [facility] tomorrow as recommended on day 10 (2/23) as previously recommended: when hemorrhage fully resolved radiographically, would then transition to anticoagulation (ideally Eliquis 5 mg BID)   · Continue atorvastatin 40 mg daily  · Seizure precautions  · Ativan 2mg for seizure activity greater than 3-5 minutes   · Goal euglycemia, normothermia  · Telemetry  · Frequent neurochecks  · PT/OT/speech  · STAT CT head with any acute change in neurologic exam  · Medical management supportive care per primary team, notify with changes              SUBJECTIVE     Patient was seen and examined  No acute events overnight   Pt reports no complaints  Pt exam limited d/t CPAP  Pertinent Negatives include: headaches, numbness or tingling         OBJECTIVE     Patient ID: Libra Dash is a 75 y o  female  Vitals:    23 0727 23 0743 23 0806 23 0824   BP: 141/80 139/81 141/83    Pulse: (!) 108 (!) 109 (!) 114    Resp: 16      Temp: 97 8 °F (36 6 °C)  97 5 °F (36 4 °C)    TempSrc:       SpO2: 98% 98% 96% 98%      Temperature:   Temp (24hrs), Av 5 °F (36 4 °C), Min:96 7 °F (35 9 °C), Max:98 2 °F (36 8 °C)    Temperature: 97 5 °F (36 4 °C)      Physical Exam  HENT:      Head: Normocephalic and atraumatic  Right Ear: External ear normal       Left Ear: External ear normal       Mouth/Throat:      Mouth: Mucous membranes are dry  Eyes:      General:         Right eye: No discharge  Left eye: No discharge  Pupils: Pupils are equal, round, and reactive to light  Cardiovascular:      Rate and Rhythm: Tachycardia present  Skin:     General: Skin is warm and dry  Capillary Refill: Capillary refill takes less than 2 seconds  Neurological:      Mental Status: She is alert and oriented to person, place, and time  Coordination: Finger-Nose-Finger Test normal       Deep Tendon Reflexes:      Reflex Scores:       Bicep reflexes are 2+ on the right side and 2+ on the left side  Brachioradialis reflexes are 2+ on the right side and 2+ on the left side  Patellar reflexes are 2+ on the right side and 3+ (suprapatellar reflex reactive) on the left side  Achilles reflexes are 2+ on the right side and 2+ on the left side  Psychiatric:         Speech: Speech is slurred  Neurologic Exam     Mental Status   Oriented to person, place, and time  Attention: decreased  Speech: slurred     Cranial Nerves     CN II   Visual fields full to confrontation  CN III, IV, VI   Pupils are equal, round, and reactive to light  Ophthalmoparesis: Difficulty w/ leftward gaze  Able to look leftward with repeated command  CN V   Facial sensation intact  CN VII   Facial expression full, symmetric  CN VIII   Hearing: intact    CN IX, X   Palate: symmetric    CN XI   Right trapezius strength: normal  Left trapezius strength: normal    CN XII   CN XII normal      Motor Exam     Strength   Right deltoid: 5/5  Left deltoid: 5/5  Right biceps: 5/5  Left biceps: 5/5  Right triceps: 5/5  Left triceps: 5/5  Right iliopsoas: 5/5  Left iliopsoas: 4/5  Right quadriceps: 4/5  Left quadriceps: 5/5  Right anterior tibial: 5/5  Left anterior tibial: 5/5    Sensory Exam   Light touch normal      Gait, Coordination, and Reflexes     Coordination   Finger to nose coordination: normal    Reflexes   Right brachioradialis: 2+  Left brachioradialis: 2+  Right biceps: 2+  Left biceps: 2+  Right patellar: 2+  Left patellar: 3+ (suprapatellar reflex reactive)  Right achilles: 2+  Left achilles: 2+  Right plantar: normal  Left plantar: upgoing  Right ankle clonus: absent  Left pendular knee jerk: absent             LABORATORY DATA     Labs: I have personally reviewed pertinent reports     and I have personally reviewed pertinent films in PACS  Results from last 7 days   Lab Units 02/23/23  0452 02/22/23 2004 02/22/23  1405   WBC Thousand/uL 7 20  --  7 80   HEMOGLOBIN g/dL 14 3  --  13 6   HEMATOCRIT % 43 8  --  41 4   PLATELETS Thousands/uL 261 248 270   NEUTROS PCT % 75  --  67   MONOS PCT % 10  --  13*      Results from last 7 days   Lab Units 02/23/23  0452 02/22/23  1400   SODIUM mmol/L 139 142   POTASSIUM mmol/L 4 1 4 4   CHLORIDE mmol/L 112* 111*   CO2 mmol/L 23 29   BUN mg/dL 13 15   CREATININE mg/dL 0 84 0 93   CALCIUM mg/dL 11 1* 10 9*   ALK PHOS U/L 48 49   ALT U/L 19 20   AST U/L 22 26              Results from last 7 days   Lab Units 02/22/23  1400   INR  0 95   PTT seconds 29     Results from last 7 days   Lab Units 02/22/23  1400   LACTIC ACID mmol/L 1 4           IMAGING & DIAGNOSTIC TESTING     Radiology Results: I have personally reviewed pertinent films in PACS    CT chest abdomen pelvis wo contrast   Final Result by Laura Lopez MD (02/23 0911)      1  Mild questionable colonic wall thickening, which may be related to underdistention versus mild colitis in the appropriate clinical setting  2   Small postprocedural hematoma measuring up to 2 cm adjacent to the right femoral vessels  3   No acute abnormality in the chest             Workstation performed: [id number]         MRI brain wo contrast   Final Result by Ben Arreguin MD (02/22 2219)         1  New small acute /more recent interval lacunar infarction in the right cerebral peduncle (series 4, image 14)  2   Extensive multifocal large, previously present right middle cerebral artery infarction which is evolving with superimposed areas of evolving hemorrhage likely related to hemorrhagic transformation of subacute right MCA infarction  No significant    mass effect  3   Mild, chronic microangiopathy  Workstation performed: [id number]         CT head without contrast   Final Result by Gabe Persaud MD (02/22 1440)      No acute intracranial abnormality                    Workstation performed: [id number]         VAS lower limb arterial duplex, complete bilateral    (Results Pending)       ACTIVE MEDICATIONS     Current Facility-Administered Medications   Medication Dose Route Frequency   • albuterol (PROVENTIL HFA,VENTOLIN HFA) inhaler 2 puff  2 puff Inhalation Q6H PRN   • aspirin (ECOTRIN LOW STRENGTH) EC tablet 81 mg  81 mg Oral Daily   • atorvastatin (LIPITOR) tablet 40 mg  40 mg Oral QPM   • buPROPion (WELLBUTRIN) tablet 100 mg  100 mg Oral BID   • ceFAZolin (ANCEF) IVPB (premix in dextrose) 2,000 mg 50 mL  2,000 mg Intravenous Q8H   • DULoxetine (CYMBALTA) delayed release capsule 60 mg  60 mg Oral BID   • enoxaparin (LOVENOX) subcutaneous injection 40 mg  40 mg Subcutaneous Daily   • gabapentin (NEURONTIN) capsule 300 mg  300 mg Oral BID   • hydroxychloroquine (PLAQUENIL) tablet 200 mg  200 mg Oral Once per day on Mon Tue Wed Thu Fri   • [START ON 2/25/2023] hydroxychloroquine (PLAQUENIL) tablet 400 mg  400 mg Oral Once per day on Sun Sat   • lamoTRIgine (LaMICtal) tablet 150 mg  150 mg Oral HS   • metoprolol succinate (TOPROL-XL) 24 hr tablet 25 mg  25 mg Oral BID   • morphine (MS CONTIN) ER tablet 15 mg  15 mg Oral BID   • polyethylene glycol (MIRALAX) packet 17 g  17 g Oral Daily PRN   • senna-docusate sodium (SENOKOT S) 8 6-50 mg per tablet 1 tablet  1 tablet Oral BID       Prior to Admission medications    Medication Sig Start Date End Date Taking? Authorizing Provider   albuterol (PROVENTIL HFA,VENTOLIN HFA) 90 mcg/act inhaler Inhale 2 puffs every 6 (six) hours as needed for wheezing    Historical Provider, MD   atorvastatin (LIPITOR) 40 mg tablet Take 1 tablet (40 mg total) by mouth every evening 2/16/23   Alla Sahni MD   buPROPion [facility]) 100 mg tablet Take 100 mg by mouth 2 (two) times a day    Historical Provider, MD   DULOXETINE HCL PO Take 60 mg by mouth 2 (two) times a day    Megan Amado DO   gabapentin (NEURONTIN) 300 mg capsule Take 1 capsule (300 mg total) by mouth 2 (two) times a day 6/28/21   Porter Zendejas MD   hydroxychloroquine (PLAQUENIL) 200 mg tablet Take 1 tablet (200mg) every Mon, Tues, Wed, Thurs, and Fri   Take 2 tablets (400mg) every Sat and Sun  12/29/22 6/28/23  Armen Wilson MD   LamoTRIgine (LAMICTAL PO) Take 1 tablet by mouth daily at bedtime pt taking 150mg daily at bedtime     Historical Provider, MD   metoprolol succinate (TOPROL-XL) 25 mg 24 hr tablet Take 1 tablet (25 mg total) by mouth 2 (two) times a day 2/16/23   Alla Sahni MD   morphine (MS CONTIN) 15 mg 12 hr tablet Take 1 tablet (15 mg total) by mouth 2 (two) times a day Max Daily Amount: 30 mg 2/16/23   Alla Sahni MD   polyethylene glycol (MIRALAX) 17 g packet Take 17 g by mouth daily as needed (Constipation) 3/8/22   Otis Rincon PA-C   raNITIdine HCl (ZANTAC PO) Take by mouth    Historical Provider, MD   senna-docusate sodium (SENOKOT S) 8 6-50 mg per tablet Take 1 tablet by mouth 2 (two) times a day 3/8/22   Otis Rincon PA-C         VTE Pharmacologic Prophylaxis: Enoxaparin (Lovenox)  VTE Mechanical Prophylaxis: sequential compression device    ==  Ellsworth Lauren

## 2023-02-23 NOTE — PROGRESS NOTES
NEUROLOGY RESIDENCY PROGRESS NOTE     Name: Justine Davila   Age & Sex: 75 y o  female   MRN: [id number]  Unit/Bed#: [facility] 628-01   Encounter: [id number]    Recommendations for outpatient neurological follow up have yet to be determined  Pending for discharge: Stroke Work up     [address] [place] [address]     * Acute metabolic encephalopathy  Assessment & Plan  Assessment:   Justine Davila is a 75 y o  female with a pertinent medical history consisting of recent CVA 2/12/22 ( R  MCA s/p M1 thrombectomy TICI 3)  Anxiety, Bipolar disorder, Chronic panic syndrome on MS contin, RA, HF  who initially presented to the [facility] ED on 2/22 with AMS   On arrival to the ED her initial SBP was 117/84 and tachycardiac to 125, Initial Labs consisting of Procalcitonin, troponin, Coma panel, Ethanol, CBC, CMP and Lipase were within the normal limits  CTH did no reveal any acute intracranial pathology  She received a dose of 0 5 Ativan and 2 mg Narcan in the ED  On exam patient is arousal to verbal and tactile stimuli  Follows simple commands  Plan:  · Recommend MRI brain to rule out new acute infarcts   · Routine EEG   · CTH negative   · Would recommend obtaining vessel imaging, initially recommended CTA however patient has a allergy (anaphalaxis) with exam improved will defer, however low threshold to repeat     · Repeat CTH negative, would consider starting [facility] tomorrow as recommended on day 10 (2/23) as previously recommended: when hemorrhage fully resolved radiographically, would then transition to anticoagulation (ideally Eliquis 5 mg BID)   · Continue atorvastatin 40 mg daily  · Seizure precautions  · Ativan 2mg for seizure activity greater than 3-5 minutes   · Goal euglycemia, normothermia  · Telemetry  · Frequent neurochecks  · PT/OT/speech  · STAT CT head with any acute change in neurologic exam  · Medical management supportive care per primary team, notify with changes            SUBJECTIVE     Patient was seen and examined  No acute events overnight  Patient's mental status improved  Review of Systems   Unable to perform ROS: Mental status change   All other systems reviewed and are negative  OBJECTIVE     Patient ID: Ines Will is a 75 y o  female  Vitals:    23 0743 23 0806 23 0824 23 1035   BP: 139/81 141/83  137/82   Pulse: (!) 109 (!) 114  (!) 106   Resp:    21   Temp:  97 5 °F (36 4 °C)  98 2 °F (36 8 °C)   TempSrc:       SpO2: 98% 96% 98% 98%      Temperature:   Temp (24hrs), Av 6 °F (36 4 °C), Min:96 7 °F (35 9 °C), Max:98 2 °F (36 8 °C)    Temperature: 98 2 °F (36 8 °C)      Physical Exam  Vitals and nursing note reviewed  Constitutional:       Appearance: She is ill-appearing  HENT:      Head: Normocephalic and atraumatic  Nose: Nose normal    Eyes:      Extraocular Movements: Extraocular movements intact  Pupils: Pupils are equal, round, and reactive to light  Musculoskeletal:         General: Normal range of motion  Psychiatric:         Speech: Speech is slurred  Neurologic Exam     Mental Status   Follows 1 step commands  Speech: slurred   Level of consciousness: arousable by verbal stimuli  Oriented to name and location   Does not follow 2 step commands, able to participate in a neuro exam with 1 step commands      Cranial Nerves     CN III, IV, VI   Pupils are equal, round, and reactive to light  Able to cross midline, pupils are reactive, EOMi   No nystagmus  Tongue midline  No facial droop appreciated      Motor Exam Right Extremity   Deltoids: 5/5  Biceps :5/5  Triceps: 5/5  : 5/5  Hip flexors: 4/5  Knee Ext :5/5  Knee Flex: 5/5  Dorsiflexion 5/5  Plantarflexion: 5/5    Left Extremity   Deltoids: 5/5  Biceps :4+/5  Triceps: 4+/5  : 4/5  Hip flexors: 4/5  Knee Ext :5/5  Knee Flex: 5/5  Dorsiflexion 5/5  Plantarflexion: 5/5       Sensory Exam   Light touch normal         LABORATORY DATA     Labs:  I have personally reviewed pertinent reports  Results from last 7 days   Lab Units 02/23/23  0452 02/22/23 2004 02/22/23  1405   WBC Thousand/uL 7 20  --  7 80   HEMOGLOBIN g/dL 14 3  --  13 6   HEMATOCRIT % 43 8  --  41 4   PLATELETS Thousands/uL 261 248 270   NEUTROS PCT % 75  --  67   MONOS PCT % 10  --  13*      Results from last 7 days   Lab Units 02/23/23  0452 02/22/23  1400   SODIUM mmol/L 139 142   POTASSIUM mmol/L 4 1 4 4   CHLORIDE mmol/L 112* 111*   CO2 mmol/L 23 29   BUN mg/dL 13 15   CREATININE mg/dL 0 84 0 93   CALCIUM mg/dL 11 1* 10 9*   ALK PHOS U/L 48 49   ALT U/L 19 20   AST U/L 22 26              Results from last 7 days   Lab Units 02/22/23  1400   INR  0 95   PTT seconds 29     Results from last 7 days   Lab Units 02/22/23  1400   LACTIC ACID mmol/L 1 4           IMAGING & DIAGNOSTIC TESTING     Radiology Results: I have personally reviewed pertinent reports  CT chest abdomen pelvis wo contrast   Final Result by Jennifer Hernandez MD (02/23 0090)      1  Mild questionable colonic wall thickening, which may be related to underdistention versus mild colitis in the appropriate clinical setting  2   Small postprocedural hematoma measuring up to 2 cm adjacent to the right femoral vessels  3   No acute abnormality in the chest             Workstation performed: [id number]         MRI brain wo contrast   Final Result by Crystal Tenorio MD (02/22 2219)         1  New small acute /more recent interval lacunar infarction in the right cerebral peduncle (series 4, image 14)  2   Extensive multifocal large, previously present right middle cerebral artery infarction which is evolving with superimposed areas of evolving hemorrhage likely related to hemorrhagic transformation of subacute right MCA infarction  No significant    mass effect  3   Mild, chronic microangiopathy        Workstation performed: [id number]         CT head without contrast   Final Result by Adryan Benavides MD (02/22 3805) No acute intracranial abnormality  Workstation performed: [id number]         VAS lower limb arterial duplex, complete bilateral    (Results Pending)   CT head wo contrast    (Results Pending)       Other Diagnostic Testing: I have personally reviewed pertinent reports  ACTIVE MEDICATIONS     Current Facility-Administered Medications   Medication Dose Route Frequency   • albuterol (PROVENTIL HFA,VENTOLIN HFA) inhaler 2 puff  2 puff Inhalation Q6H PRN   • aspirin (ECOTRIN LOW STRENGTH) EC tablet 81 mg  81 mg Oral Daily   • atorvastatin (LIPITOR) tablet 40 mg  40 mg Oral QPM   • buPROPion (WELLBUTRIN) tablet 100 mg  100 mg Oral BID   • ceFAZolin (ANCEF) IVPB (premix in dextrose) 2,000 mg 50 mL  2,000 mg Intravenous Q8H   • DULoxetine (CYMBALTA) delayed release capsule 60 mg  60 mg Oral BID   • enoxaparin (LOVENOX) subcutaneous injection 40 mg  40 mg Subcutaneous Daily   • gabapentin (NEURONTIN) capsule 300 mg  300 mg Oral BID   • hydroxychloroquine (PLAQUENIL) tablet 200 mg  200 mg Oral Once per day on Mon Tue Wed Thu Fri   • [START ON 2/25/2023] hydroxychloroquine (PLAQUENIL) tablet 400 mg  400 mg Oral Once per day on Sun Sat   • lamoTRIgine (LaMICtal) tablet 150 mg  150 mg Oral HS   • metoprolol succinate (TOPROL-XL) 24 hr tablet 25 mg  25 mg Oral BID   • morphine (MS CONTIN) ER tablet 15 mg  15 mg Oral BID   • polyethylene glycol (MIRALAX) packet 17 g  17 g Oral Daily PRN   • senna-docusate sodium (SENOKOT S) 8 6-50 mg per tablet 1 tablet  1 tablet Oral BID       Prior to Admission medications    Medication Sig Start Date End Date Taking?  Authorizing Provider   albuterol (PROVENTIL HFA,VENTOLIN HFA) 90 mcg/act inhaler Inhale 2 puffs every 6 (six) hours as needed for wheezing    Historical Provider, MD   atorvastatin (LIPITOR) 40 mg tablet Take 1 tablet (40 mg total) by mouth every evening 2/16/23   Mylene Rivera MD   buPROPion [facility]) 100 mg tablet Take 100 mg by mouth 2 (two) times a day    Historical Provider, MD   DULOXETINE HCL PO Take 60 mg by mouth 2 (two) times a day    Megan Amado DO   gabapentin (NEURONTIN) 300 mg capsule Take 1 capsule (300 mg total) by mouth 2 (two) times a day 6/28/21   Yasmine Chapman MD   hydroxychloroquine (PLAQUENIL) 200 mg tablet Take 1 tablet (200mg) every Mon, Tues, Wed, Thurs, and Fri   Take 2 tablets (400mg) every Sat and Sun  12/29/22 6/28/23  Abdias Donovan MD   LamoTRIgine (LAMICTAL PO) Take 1 tablet by mouth daily at bedtime pt taking 150mg daily at bedtime     Historical Provider, MD   metoprolol succinate (TOPROL-XL) 25 mg 24 hr tablet Take 1 tablet (25 mg total) by mouth 2 (two) times a day 2/16/23   Smiley Tripp MD   morphine (MS CONTIN) 15 mg 12 hr tablet Take 1 tablet (15 mg total) by mouth 2 (two) times a day Max Daily Amount: 30 mg 2/16/23   Smiley Tripp MD   polyethylene glycol (MIRALAX) 17 g packet Take 17 g by mouth daily as needed (Constipation) 3/8/22   Gabriella Blake PA-C   raNITIdine HCl (ZANTAC PO) Take by mouth    Historical Provider, MD   senna-docusate sodium (SENOKOT S) 8 6-50 mg per tablet Take 1 tablet by mouth 2 (two) times a day 3/8/22   Gabriella Blake PA-C         VTE Pharmacologic Prophylaxis: Enoxaparin (Lovenox)  VTE Mechanical Prophylaxis: sequential compression device    ==  Donella Brow, MD [facility], PGY-4

## 2023-02-23 NOTE — ASSESSMENT & PLAN NOTE
Patient history of chronic pain  Prior records noted  Patient chronically on MS Contin 15 mg twice daily  Per dtr wishes to avoid further prn dosing to avoid polypharmacy  Bowel regimen

## 2023-02-23 NOTE — ASSESSMENT & PLAN NOTE
Wt Readings from Last 3 Encounters:   02/16/23 62 6 kg (138 lb)   02/12/23 67 9 kg (149 lb 11 1 oz)   09/14/22 67 kg (147 lb 12 8 oz)   · Stress/stroke induced cardiomyopathy   · EF 25-30% on echo during prior admission   · Declined lifevest on prior admission due to stress  · Maintained on metoprolol succinate (changed from coreg prior admission), Not chronically on ace or arb  · Appears clinically compensated     · Daily weights / I and O's

## 2023-02-23 NOTE — PROGRESS NOTES
[address] [facility]  Progress Note - Zondra Apley 1955, 75 y o  female MRN: [id number]  Unit/Bed#: [facility] 628-01 Encounter: [id number]  Primary Care Provider: Jaydon Zendejas DO   Date and time admitted to hospital: 2/22/2023  1:16 PM    * Acute metabolic encephalopathy  Assessment & Plan  · Presents with AMS from outpatient facility recently admitted to [facility] for CVA where she underwent revascularization of a right m1 occlusion (Dr Etienne Garcia 2/12/2023),  · Etiology likely secondary to acute cva  S/p MRI revealing: new small acute /more recent interval lacunar infarction in the right cerebral peduncle   · Also possibly component of metabolic etiology given hypercalcemia though had occurred intermittently in the past   · Neurology on board  · Pending EEG  · Low suspicion for infectious etiology  · Cont neuro checks  · MS appears much improved  · S/p speech eval cleared for pureed with nectar thick    Acute CVA (cerebrovascular accident) [facility])  Assessment & Plan  S/p MRI  Neurology on board  Recent hx of MCA CVA with petechial hemorrhage w residual L sided weakness and dysarthria s/p mechanical thrombectomy  Pt w/ severe contrast allergy resulting anaphylaxis, no need to repeat per neuro  Recent echo on 2/16/23  CVA prophylaxis: In light of new MRI findings started on asa  Plan is to start NOAC over the weekend if stable repeat cth  Cont statin      Abdominal pain  Assessment & Plan  · Pt endorses abdominal pain, nausea now resolved  · S/p CT imaging ? Colonic thickening but no clinical correlation with colitis      Discoloration of skin of foot  Assessment & Plan  · Unclear etiology    Her daughter notes her skin is typically "blue"  · Low procal, afebrile, no leukocytosis thus doubt infectious process thus will monitor off abx  · S/p LE arterial duplex no evidence of significant arterial occulusive disease     Hypercalcemia  Assessment & Plan  · POA and has had intermittent elevations in the past  · Appreciate input per nephrology  Non suppressed PTH thus concern for primary hyperparathyroidism  · Gentle fluid hydration given underlying low ef CHF  · Will consult endocrinology  · S/p CT C/A/P no occult mass to suggest underlying malignancy    HFrEF (heart failure with reduced ejection fraction) ([facility])  Assessment & Plan  Wt Readings from Last 3 Encounters:   02/16/23 62 6 kg (138 lb)   02/12/23 67 9 kg (149 lb 11 1 oz)   09/14/22 67 kg (147 lb 12 8 oz)   · Stress/stroke induced cardiomyopathy   · EF 25-30% on echo during prior admission   · Declined lifevest on prior admission due to stress  · Maintained on metoprolol succinate (changed from coreg prior admission), Not chronically on ace or arb  · Appears clinically compensated  · Daily weights / I and O's     HTN (hypertension)  Assessment & Plan  · On toprol xl    Rheumatoid arthritis ([facility] [address] )  Assessment & Plan  · Noted and follows up with rheum outpatient   · Continue plaquenil     Bipolar depression ([facility] [address] )  Assessment & Plan  · On buproprion, duloxetine, Lamictal   · Pending lamictal level     Chronic pain syndrome  Assessment & Plan  · Noted history   · PDMP reviewed on MS contin 15 mg BID     VTE Pharmacologic Prophylaxis:   Pharmacologic: Enoxaparin (Lovenox)  Mechanical VTE Prophylaxis in Place: No    Patient Centered Rounds: I have performed bedside rounds with nursing staff today  Discussions with Specialists or Other Care Team Provider:     Education and Discussions with Family / Patient: Patient  Also called her dtr Armida Humphries and updated at length    Time Spent for Care: 30 minutes  More than 50% of total time spent on counseling and coordination of care as described above      Current Length of Stay: 1 day(s)    Current Patient Status: Inpatient   Certification Statement: The patient will continue to require additional inpatient hospital stay due to Acute illness    Discharge Plan:     Code Status: Level 1 - Full Code      Subjective: Overnight was placed on cpap more so for comfort  No sign of resp distress  Evaluated pt off cpap and continues to remains w/o sign of resp distress  MS actually much improved was able not specifics of her medical hx  She is following complex commands    Objective:     Vitals:   Temp (24hrs), Av 8 °F (36 6 °C), Min:97 3 °F (36 3 °C), Max:98 2 °F (36 8 °C)    Temp:  [97 3 °F (36 3 °C)-98 2 °F (36 8 °C)] 98 °F (36 7 °C)  HR:  [] 112  Resp:  [16-22] 21  BP: (133-159)/(74-98) 159/98  SpO2:  [95 %-98 %] 95 %  There is no height or weight on file to calculate BMI  Input and Output Summary (last 24 hours): Intake/Output Summary (Last 24 hours) at 2023 1639  Last data filed at 2023 1237  Gross per 24 hour   Intake 849 17 ml   Output --   Net 849 17 ml       Physical Exam:     Physical Exam  Cardiovascular:      Rate and Rhythm: Normal rate and regular rhythm  Pulses: Normal pulses  Heart sounds: Normal heart sounds  Pulmonary:      Effort: Pulmonary effort is normal  No respiratory distress  Breath sounds: Normal breath sounds  No wheezing  Abdominal:      General: Bowel sounds are normal  There is no distension  Palpations: Abdomen is soft  Tenderness: There is no abdominal tenderness  There is no guarding  Musculoskeletal:      Cervical back: Normal range of motion and neck supple  Right lower leg: No edema  Left lower leg: No edema  Skin:     General: Skin is warm and dry  Neurological:      Mental Status: She is alert  Cranial Nerves: Cranial nerve deficit present  Motor: Weakness present        Comments: L sided weakness  + dysarthria           Additional Data:     Labs:    Results from last 7 days   Lab Units 23  0452   WBC Thousand/uL 7 20   HEMOGLOBIN g/dL 14 3   HEMATOCRIT % 43 8   PLATELETS Thousands/uL 261   NEUTROS PCT % 75   LYMPHS PCT % 14   MONOS PCT % 10   EOS PCT % 1     Results from last 7 days   Lab Units 02/23/23  0452   SODIUM mmol/L 139   POTASSIUM mmol/L 4 1   CHLORIDE mmol/L 112*   CO2 mmol/L 23   BUN mg/dL 13   CREATININE mg/dL 0 84   ANION GAP mmol/L 4   CALCIUM mg/dL 11 1*   ALBUMIN g/dL 3 1*   TOTAL BILIRUBIN mg/dL 0 54   ALK PHOS U/L 48   ALT U/L 19   AST U/L 22   GLUCOSE RANDOM mg/dL 81     Results from last 7 days   Lab Units 02/22/23  1400   INR  0 95             Results from last 7 days   Lab Units 02/22/23  1400   LACTIC ACID mmol/L 1 4   PROCALCITONIN ng/ml 0 06           * I Have Reviewed All Lab Data Listed Above  * Additional Pertinent Lab Tests Reviewed: All Labs Within Last 24 Hours Reviewed    Imaging:    Imaging Reports Reviewed Today Include:   Imaging Personally Reviewed by Myself Includes:      Recent Cultures (last 7 days):     Results from last 7 days   Lab Units 02/22/23  1405 02/22/23  1402   BLOOD CULTURE  Received in Microbiology Lab  Culture in Progress  Received in Microbiology Lab  Culture in Progress         Last 24 Hours Medication List:   Current Facility-Administered Medications   Medication Dose Route Frequency Provider Last Rate   • albuterol  2 puff Inhalation Q6H PRN Sarah Staples PA-C     • aspirin  81 mg Oral Daily Andreea Canales MD     • atorvastatin  40 mg Oral QPM Sarah Staples PA-C     • buPROPion  100 mg Oral BID Sarah Staples PA-C     • DULoxetine  60 mg Oral BID Sarah Staples PA-C     • enoxaparin  40 mg Subcutaneous Daily Sarah Staples PA-C     • gabapentin  300 mg Oral BID Sarah Staples PA-C     • hydroxychloroquine  200 mg Oral Once per day on Mon Tue Wed Thu Fri LAURA Flores     • [START ON 2/25/2023] hydroxychloroquine  400 mg Oral Once per day on Sun Sat LAURA Flores     • lamoTRIgine  150 mg Oral HS Sarah Staples PA-C     • metoprolol succinate  25 mg Oral BID Sarah Staples PA-C     • morphine  15 mg Oral BID Sarah Staples PA-C     • polyethylene glycol  17 g Oral Daily PRN Sarah Staples PA-C     • senna-docusate sodium  1 tablet Oral BID Nicolas Givens PA-C          Today, Patient Was Seen By: Valeria Jimenez DO    ** Please Note: Dictation voice to text software may have been used in the creation of this document   **

## 2023-02-23 NOTE — SPEECH THERAPY NOTE
Speech Language/Pathology    Speech/Language Pathology Progress Note    Patient Name: Alexandru Gutiérrez  [id number] Date: 2/23/2023    Subjective:  "I am supposed to work today  Oh that's right, I am retired" Patient now awake and alert  Objective:  RN asked for re-assessment as patient is more awake and alert now  She is on RA  The patient is able to independently perform oral care with swabs and suction kit  She is assessed with thin and nectar thick liquids, with applesauce  The patient has decreased strength to draw from straw, with retropulsion back into straw, especially on left side of oral cavity  With cues, the patient uses straw on right side and has increased strength  Suspect the patient has decreased oral control with thin liquids, and is observed with consistent cough response  She tolerates nectar thick liquids better, with no overt s/s aspiration  The patient has improved oral closure for tsp  Transfer time is min prolonged  No oral residue observed with puree solids  Swallow appears more coordinated this afternoon as patient has improved alertness  Patient with history of dysphagia and aspiration  She is agreeable to conservative diet of puree and nectar thick liquids at this time  Assessment:  Patient has coughing with thin liquids  Tolerates nectar thick liquids with no overt s/s aspiration  Plan/Recommendations:  Recommend to begin conservative diet of puree and nectar thick liquids  ST will continue to further assess tolerance and trial upgrades as able

## 2023-02-23 NOTE — CASE MANAGEMENT
Case Management Assessment & Discharge Planning Note    Patient name Paramjit Suarez  Location [address] [place] [address] [address] [id number]  : 1955 Date 2023       Current Admission Date: 2023  Current Admission Diagnosis:Acute metabolic encephalopathy   Patient Active Problem List    Diagnosis Date Noted   • Hypercalcemia 2023   • Discoloration of skin of foot 2023   • Abdominal pain 2023   • HFrEF (heart failure with reduced ejection fraction) ([facility] ) 2023   • Stroke ([place] [address] ) 2023   • HTN (hypertension) 2023   • Cardiomyopathy ([facility] ) 2023   • Vitamin D insufficiency 2023   • LAILA (acute kidney injury) ([place] [address] ) 2022   • Polypharmacy 2022   • 'light-for-dates' infant with signs of fetal malnutrition 2022   • Bipolar depression ([facility] ) 2022   • Rheumatoid arthritis ([place] [address] ) 2022   • Closed fracture of multiple ribs of right side 2022   • Traumatic pneumothorax 2022   • Closed fracture of transverse process of lumbar vertebra ([facility] ) 2022   • Liver contusion 2022   • Fall 2022   • Acute pain due to trauma 2022   • Right shoulder pain 2022   • Severe protein-calorie malnutrition ([facility] ) 2022   • Undifferentiated connective tissue disease ([place] [address] ) 2021   • Primary generalized (osteo)arthritis 2021   • Peripheral neuropathy 2021   • Senile osteoporosis 2021   • Hx of compression fracture of spine 2021   • Lumbar spondylosis 2021   • Cervical spondylosis 2021   • Anemia 2021   • Chronic pain syndrome    • Asthma    • Ambulatory dysfunction    • Acute metabolic encephalopathy    • Seronegative arthropathy of multiple sites ([facility] )    • Gastroesophageal reflux disease without esophagitis    • Mixed stress and urge urinary incontinence    • CHF (congestive heart failure) ([facility] )       LOS (days): 1  Geometric Mean LOS (GMLOS) (days): 4 60  Days to GMLOS:4 OBJECTIVE:  PATIENT READMITTED TO HOSPITAL  Risk of Unplanned Readmission Score: 15 29         Current admission status: Inpatient       Preferred Pharmacy:   [facility] [address], [address]  [address]  [place] [address]  Phone: [phone] Fax: [phone]    Primary Care Provider: Edy Green DO    Primary Insurance: MEDICARE  Secondary Insurance: AARP    ASSESSMENT:  [address], [address] Representative - Daughter   Primary Phone: [phone] (Mobile)               Readmission Root Cause  30 Day Readmission: Yes  Who directed you to return to the hospital?: Other (comment) ([address] [place] [address],[facility])  Did you understand whom to contact if you had questions or problems?: Yes  Did you get your prescriptions before you left the hospital?: Yes  Were you able to get your prescriptions filled when you left the hospital?: Yes  Did you take your medications as prescribed?: Yes  Were you able to get to your follow-up appointments?: Yes  During previous admission, was a post-acute recommendation made?: Yes  What post-acute resources were offered?: STR  Patient was readmitted due to: encephalopathy, confusion  Action Plan: awaiting medical stability, likely will need STR    Patient Information  Admitted from[de-identified] Facility ([address] [place] [address]- for STR)  Mental Status: Confused  During Assessment patient was accompanied by: Not accompanied during assessment  Assessment information provided by[de-identified] Daughter  Support Systems: Self, Son, Daughter, Family members, Other (Comment) ([address] [place] [address],[facility])  Type of Current Residence: Facility ([address] [place] [address],[facility])  Upon entering residence, is there a bedroom on the main floor (no further steps)?: Yes  Upon entering residence, is there a bathroom on the main floor (no further steps)?: Yes  In the last 12 months, was there a time when you were not able to pay the mortgage or rent on time?: No  In the last 12 months, how many places have you lived?: 1  In the last 12 months, was there a time when you did not have a steady place to sleep or slept in a shelter (including now)?: No  Homeless/housing insecurity resource given?: N/A  Is patient a ?: No    Activities of Daily Living Prior to Admission  Functional Status: Assistance  Completes ADLs independently?: No  Level of ADL dependence: Assistance  Does patient currently own DME?: Yes  What DME does the patient currently own?: Elijah Ford, Shower Chair, Other (Comment) (Grab bars)  Does patient have a history of Outpatient Therapy (PT/OT)?: No  Does the patient have a history of Short-Term Rehab?: Yes (Elías Kerry, [facility] (daughter does not want to return to [facility]))  Does patient have a history of [address]?: No  Does patient currently have [address]?: No    Patient Information Continued  Income Source: Pension/longterm  Does patient have prescription coverage?: Yes  Within the past 12 months, you worried that your food would run out before you got the money to buy more : Never true  Within the past 12 months, the food you bought just didn't last and you didn't have money to get more : Never true  Food insecurity resource given?: N/A  Does patient receive dialysis treatments?: No  Does patient have a history of substance abuse?: No  Does patient have a history of Mental Health Diagnosis?: Yes (bipolar, depression, anxiety)  Is patient receiving treatment for mental health?: Yes  Has patient received inpatient treatment related to mental health in the last 2 years?: No    Means of Transportation  Means of Transport to Appts[de-identified] Family transport  In the past 12 months, has lack of transportation kept you from medical appointments or from getting medications?: No  In the past 12 months, has lack of transportation kept you from meetings, work, or from getting things needed for daily living?: No  Was application for public transport provided?: N/A        DISCHARGE DETAILS:    Discharge planning discussed with[de-identified] Daughter Sherin Parker of Choice: Yes  Comments - Freedom of Choice: Discussed [address]    Contacts  Patient Contacts: Aris Franks  Relationship to Patient[de-identified] Family  Contact Method: Phone  Phone Number: [phone]  Reason/Outcome: Continuity of Care, Discharge Planning, Emergency Contact    Other Referral/Resources/Interventions Provided:  Referral Comments: Daughter Mario Alberto Henning states that she does not want her mother to return to [facility] on DC  Patient will likely need rehab, daughter requesting referral to [facility], entered in [address] [email or web address]     CM reviewed d/c planning process including the following: identifying help at home, patient preference for d/c planning needs, Discharge Lounge, [facility] Meds to Bed program, availability of treatment team to discuss questions or concerns patient and/or family may have regarding understanding medications and recognizing signs and symptoms once discharged  CM also encouraged patient to follow up with all recommended appointments after discharge  Patient advised of importance for patient and family to participate in managing patient’s medical well being  Information obtained from daughter and old records  Patient came from [facility], where he was for STR  Vaccinated for covid with 1 booster noted

## 2023-02-23 NOTE — SPEECH THERAPY NOTE
Speech-Language Pathology Bedside Swallow Evaluation      Patient Name: Giovanna Estrada    YJXMP'C Date: 2/23/2023     Problem List  Principal Problem:    Acute metabolic encephalopathy  Active Problems:    Chronic pain syndrome    Bipolar depression (HCC)    Rheumatoid arthritis (HCC)    HTN (hypertension)    HFrEF (heart failure with reduced ejection fraction) ([facility])    Hypercalcemia    Discoloration of skin of foot    Abdominal pain      Past Medical History  Past Medical History:   Diagnosis Date   • Anxiety    • Asthma    • Bipolar depression ([facility] )    • Chronic narcotic dependence ([facility] )    • Chronic pain    • Depression    • Enterovirus heart infection    • SHAR (obstructive sleep apnea)    • Osteoarthritis    • Peripheral neuropathy    • Plantar fasciitis of right foot    • Senile osteoporosis    • Seronegative arthropathy of multiple sites ([facility] )    • Undifferentiated connective tissue disease ([facility] )        Past Surgical History  Past Surgical History:   Procedure Laterality Date   • BREAST IMPLANT     • FIXATION KYPHOPLASTY LUMBAR SPINE     • HYSTERECTOMY     • INCONTINENCE SURGERY N/A    • IR STROKE ALERT  2/12/2023   • NASAL SEPTOPLASTY W/ TURBINOPLASTY N/A    • RECTAL PROLAPSE REPAIR N/A    • REPAIR RECTOCELE     • RHINOPLASTY N/A        Summary   Pt presented with s/s suggestive of severe oral and suspected moderate-severe pharyngeal dysphagia  The patient is assessed with puree solids, nectar thick liquids, and ice chips  The patient has open oral posture at rest  She has decreased closure for tsp  Transfer time is prolonged with most, and absent for nectar thick liquids  Oral suction is used to remove residue  Swallow initiation time is delayed  Swallow is audible  The patient needs double swallows to clear ice chips  O2 does remain stable  Patient fatigues quickly  Oral care provided prior to PO trials with the removal of a moderate amount of thick, tan secretions  Gag reflex is present and strong  Risk/s for Aspiration: high      Recommended Diet: NPO   Recommended Form of Meds: non-oral if possible   Other Recommendations: Continue frequent oral care**    Current Medical Status  Chief Complaint: AMS    History of Present Illness:  Tesfaye Tovar is a 75 y o  female with a PMH of anxiety, bipolar disorder, chronic pain syndrome on MS Contin, CVA status post vascularization of M1 occlusion, rheumatoid arthritis, stress/stroke induced heart failure with reduced ejection fraction of 25 - 30% not on LifeVest who presents with alteration in mental status  Her daughter notes that Monday night she appeared to be "off" and has since declined      Presently pt appears somewhat confused, she knows where she is and knows she just had a stroke however is tangential and is talking about going diving  She has several jerking movements of her extremities/whole body throughout exam which are new  Her b/l feet are discolored  Daughter reports since her stroke she has L sided weakness and dysarthria      Pt hx limited given dysarthria, AMS  She endorses back pain (chronic), abdominal pain and nausea  She denies SOB  She does not answer reliably when asked if she has pain of her feet/legs  Current Precautions:  Fall  Aspiration    Allergies:  No known food allergies  Past medical history:  Please see H&P for details    Special Studies:  MRI brain 2/22/2023:   1  New small acute /more recent interval lacunar infarction in the right cerebral peduncle (series 4, image 14)  2   Extensive multifocal large, previously present right middle cerebral artery infarction which is evolving with superimposed areas of evolving hemorrhage likely related to hemorrhagic transformation of subacute right MCA infarction  No significant   mass effect  3   Mild, chronic microangiopathy  Previous VBS and LVH:   VBS 5/26/2021:    Overall there was prominent cricopharyngeal impression     Thin liquids by cup: Aspiration with cough   Thin liquids by cup, small sip with oral prep: Transient penetration x1 and   silent aspiration x1   Nectar consistency by cup: Transient penetration   Puree: Transient penetration  Mild vallecular and piriform sinus retention   Mixed consistency: Silent aspiration of fluid  Moderate vallecular retention of   fruit  Mixed consistency, small-volume: Transient penetration and vallecular retention   Nectar wash by cup: Silent aspiration of retained fluid  Nectar consistency by cup: Transient penetration   IMPRESSION:   IMPRESSION: Episodes of aspiration and penetration as above  Please see speech   pathology report for recommendations       VBS 10/30/2018:   Deep penetration to the level of the cords with thin liquids  Mild to moderate   penetration, the bedside is seen with nectar consistency  Transient penetration   with the liquid component of mixed solid consistency  Please see detailed speech   and swallow report       Social/Education/Vocational Hx:  Pt recently at [facility] for acute rehab     Swallow Information   Current Risks for Dysphagia & Aspiration: CVA, known history of dysphagia, known history of aspiration, dysarthria, AMS and decreased alertness  Current Symptoms/Concerns: pocketing food  Current Diet: NPO   Baseline Diet: was last on mechanical soft and thin liquids during last admission (2/16/2023)    Baseline Assessment   Behavior/Cognition: waxing and waning arousal level  Speech/Language Status: not able to to follow commands and limited verbal output  Patient Positioning: upright in bed  Pain Status/Interventions/Response to Interventions: Patient reporting right side hip pain   RN aware      Swallow Mechanism Exam  Facial: left facial droop  Labial: bilateral decreased ROM  Lingual: unable to test 2/2 limited command following  Velum: symmetrical  Mandible: adequate ROM  Dentition: limited dentition  Vocal quality:clear/adequate   Respiratory Status: on RA       Consistencies Assessed and Performance   Consistencies Administered: ice chips, nectar thick and puree  Materials administered included applesauce and nectar thick liquids with ice chips     Oral Stage: severe  Oral closure for tsp is reduced  Transfer time is prolonged across trials  Transfer is absent with nectar thick liquids, and oral suction is used to remove residue  Suspect that oral control is reduced  Patient is observed with tongue pumping throughout  Pharyngeal Stage: moderate-severe  Swallow initiation time is delayed  Swallow is audible  The patient needs double swallows to clear ice chips  No coughing observed, but patient fatigues quickly and is unable to fully maintain alertness for trials  Esophageal Concerns: none reported    Summary and Recommendations (see above)    Results Reviewed with: patient and RN     Treatment Recommended: dysphagia therapy      Frequency of treatment: 3-5x week, as able     Patient Stated Goal: none     Dysphagia LTG  -Patient will demonstrate safe and effective oral intake (without overt s/s significant oral/pharyngeal dysphagia including s/s penetration or aspiration) for the highest appropriate diet level       Short Term Goals:  -Pt will tolerate Dysphagia 1/pureed diet and honey thick liquid with no significant s/s oral or pharyngeal dysphagia across 1-3 diagnostic session/s    -Patient will tolerate trials of upgraded food and/or liquid texture with no significant s/s of oral or pharyngeal dysphagia including aspiration across 1-3 diagnostic sessions     -Patient will comply with a Video/Modified Barium Swallow study for more complete assessment of swallowing anatomy/physiology/aspiration risk and to assess efficacy of treatment techniques so as to best guide treatment plan    Speech Therapy Prognosis   Prognosis: guarded     Prognosis Considerations: medical status, prior medical history and medically fragile status

## 2023-02-23 NOTE — OCCUPATIONAL THERAPY NOTE
Occupational Therapy cx        Patient Name: Elysia Sosa  QSMOD'X Date: 2/23/2023 02/23/23 0959   OT Last Visit   OT Visit Date 02/23/23   Note Type   Note type Evaluation   Cancel Reasons Medical status   Additional Comments OT orders received and chart reviewed  Pt on bi-pap at the time of attempted evaluation, not appropraite for OT sessoin at this time  will address as clinical course allows           Faby Amador, MAY, OTR/L

## 2023-02-23 NOTE — ASSESSMENT & PLAN NOTE
S/p MRI  Neurology on board  Recent hx of MCA CVA with petechial hemorrhage w residual L sided weakness and dysarthria s/p mechanical thrombectomy  Pt w/ severe contrast allergy resulting anaphylaxis, no need to repeat per neuro  Recent echo on 2/16/23  CVA prophylaxis: In light of new MRI findings started on asa  Plan is to start NOAC over the weekend if stable repeat cth   Cont statin

## 2023-02-23 NOTE — ASSESSMENT & PLAN NOTE
· Presents with AMS from outpatient facility recently admitted to [facility] for CVA where she underwent revascularization of a right m1 occlusion (Dr Marti Riedel 2/12/2023),  · Etiology likely secondary to acute cva   S/p MRI revealing: new small acute /more recent interval lacunar infarction in the right cerebral peduncle   · Also possibly component of metabolic etiology given hypercalcemia though had occurred intermittently in the past   · Neurology on board  · Pending EEG  · Low suspicion for infectious etiology  · Cont neuro checks  · MS appears much improved  · S/p speech eval cleared for pureed with nectar thick

## 2023-02-23 NOTE — ASSESSMENT & PLAN NOTE
· Unclear etiology    Her daughter notes her skin is typically "blue"  · Low procal, afebrile, no leukocytosis thus doubt infectious process thus will monitor off abx  · S/p LE arterial duplex no evidence of significant arterial occulusive disease

## 2023-02-23 NOTE — CONSULTS
Consultation - Nephrology   eVnecia St 75 y o  female MRN: [id number]  Unit/Bed#: [facility] 628-01 Encounter: [id number]    ASSESSMENT/PLAN:   1  Hypercalcemia: this has been chronic/intermittent for the past few years  Corrected calcium 11 8 today (11 1 uncorrected)  · Will check hypercalcemia lab work up   · Check iCa  · CT C/A/P and head: No signs of malignancy  · Avoid any calcium or vitamin D products  · S/p trial NS at 50cc/h x 10 hours   · Check am BMP   2  Encephalopathy: neuro consulted  Had recent acute CVA on 2/12  3  HFrEF with EF 25-30%   · Not on an outpatient diuretic   · S/p gentle IVF and no signs of volume overload   4  HTN: BP acceptable   · Continue metoprolol 25mg bid   5  RA: on plaquenil   6  Bipolar: no hx of lithium use per patient     HISTORY OF PRESENT ILLNESS:  Requesting Physician: Meron Miranda  Reason for Consult: hypercalcemia     Venecia St is a 75y o  year old female who was admitted to [facility] with altered mental status  Of note she had a recent admission on 2/12 and was found to have an acute stroke and underwent thrombectomy  Her daughter noted that she was not her normal self since Monday but this has been getting worse  Today her calcium was also noted to be elevated so nephrology was consulted  When I first entered the room patient was on BiPAP  According to the nurse she was very lethargic earlier this morning but now awakens easily  She is currently alert and oriented x3 and tells me that her calcium has been elevated in the past   Complains of chronic back pain and nausea but denies any chest pain, shortness of breath, vomiting or diarrhea        PAST MEDICAL HISTORY:  Past Medical History:   Diagnosis Date   • Anxiety    • Asthma    • Bipolar depression ([facility] )    • Chronic narcotic dependence ([facility] )    • Chronic pain    • Depression    • Enterovirus heart infection    • SHAR (obstructive sleep apnea)    • Osteoarthritis    • Peripheral neuropathy    • Plantar fasciitis of right foot    • Senile osteoporosis    • Seronegative arthropathy of multiple sites ([facility] )    • Undifferentiated connective tissue disease ([facility] )        PAST SURGICAL HISTORY:  Past Surgical History:   Procedure Laterality Date   • BREAST IMPLANT     • FIXATION KYPHOPLASTY LUMBAR SPINE     • HYSTERECTOMY     • INCONTINENCE SURGERY N/A    • IR STROKE ALERT  2/12/2023   • NASAL SEPTOPLASTY W/ TURBINOPLASTY N/A    • RECTAL PROLAPSE REPAIR N/A    • REPAIR RECTOCELE     • RHINOPLASTY N/A        ALLERGIES:  Allergies   Allergen Reactions   • Dye [Iodinated Contrast Media] Anaphylaxis     IVP dye   • Bactrim [Sulfamethoxazole-Trimethoprim] Hives   • Erythromycin Hives   • Penicillins        SOCIAL HISTORY:  Social History     Substance and Sexual Activity   Alcohol Use Not Currently    Comment: quit     Social History     Substance and Sexual Activity   Drug Use Yes   • Types: Marijuana    Comment: has medical card     Social History     Tobacco Use   Smoking Status Former   Smokeless Tobacco Never       FAMILY HISTORY:  Family History   Problem Relation Age of Onset   • No Known Problems Mother    • No Known Problems Father    • Arthritis Family    • Diabetes Family    • Stroke Family        MEDICATIONS:  Scheduled Meds:  Current Facility-Administered Medications   Medication Dose Route Frequency Provider Last Rate   • albuterol  2 puff Inhalation Q6H PRN Sarah Staples PA-C     • aspirin  81 mg Oral Daily Susi Morris MD     • atorvastatin  40 mg Oral QPM Sarah Staples PA-C     • buPROPion  100 mg Oral BID Sarah Staples PA-C     • cefazolin  2,000 mg Intravenous Q8H Sarah Staples PA-C 2,000 mg (02/23/23 5600)   • DULoxetine  60 mg Oral BID Sarah Staples PA-C     • enoxaparin  40 mg Subcutaneous Daily Sarah Staples PA-C     • gabapentin  300 mg Oral BID Sarah Staples PA-C     • hydroxychloroquine  200 mg Oral Once per day on Mon Tue Wed Thu Fri LAURA Otero     • [START ON 2/25/2023] hydroxychloroquine  400 mg Oral Once per day on Sun Sat [place] LAURA García     • lamoTRIgine  150 mg Oral HS Sarah Staples PA-C     • metoprolol succinate  25 mg Oral BID Sarah Staples PA-C     • morphine  15 mg Oral BID Sarah Staples PA-C     • polyethylene glycol  17 g Oral Daily PRN Sarah Staples PA-C     • senna-docusate sodium  1 tablet Oral BID Sarah Staples PA-C         PRN Meds: •  albuterol  •  polyethylene glycol    REVIEW OF SYSTEMS:  A complete review of systems was done  Pertinent positives and negatives noted in the HPI but otherwise the review of systems is negative  PHYSICAL EXAM:  Current Weight:    First Weight:    Vitals:    02/23/23 0824   BP:    Pulse:    Resp:    Temp:    SpO2: 98%       Intake/Output Summary (Last 24 hours) at 2/23/2023 1030  Last data filed at 2/23/2023 0600  Gross per 24 hour   Intake 541 67 ml   Output --   Net 541 67 ml     General:  appears comfortable and in no acute distress   Skin:  No rash  Eyes:  Sclerae anicteric, no periorbital edema   ENT:  Moist mucous membranes  Neck:  Trachea midline, symmetric   Chest:  Occasional wheeze  CVS:  Regular rate and rhythm  Abdomen:  Soft, nontender, nondistended  Neuro:  Awake and alert  Psych:  Appropriate affect  Extremities: no lower extremity edema     Lab Results:   Results from last 7 days   Lab Units 02/23/23  0452 02/22/23 2004 02/22/23  1405 02/22/23  1400   WBC Thousand/uL 7 20  --  7 80  --    HEMOGLOBIN g/dL 14 3  --  13 6  --    HEMATOCRIT % 43 8  --  41 4  --    PLATELETS Thousands/uL 261 248 270  --    SODIUM mmol/L 139  --   --  142   POTASSIUM mmol/L 4 1  --   --  4 4   CHLORIDE mmol/L 112*  --   --  111*   CO2 mmol/L 23  --   --  29   BUN mg/dL 13  --   --  15   CREATININE mg/dL 0 84  --   --  0 93   CALCIUM mg/dL 11 1*  --   --  10 9*       Radiology Results:   CT chest abdomen pelvis wo contrast   Final Result by Ayaan Ordoñez MD (02/23 0911)      1    Mild questionable colonic wall thickening, which may be related to underdistention versus mild colitis in the appropriate clinical setting  2   Small postprocedural hematoma measuring up to 2 cm adjacent to the right femoral vessels  3   No acute abnormality in the chest             Workstation performed: [id number]         MRI brain wo contrast   Final Result by Grover Ceja MD (02/22 2219)         1  New small acute /more recent interval lacunar infarction in the right cerebral peduncle (series 4, image 14)  2   Extensive multifocal large, previously present right middle cerebral artery infarction which is evolving with superimposed areas of evolving hemorrhage likely related to hemorrhagic transformation of subacute right MCA infarction  No significant    mass effect  3   Mild, chronic microangiopathy  Workstation performed: [id number]         CT head without contrast   Final Result by Mel Burden MD (02/22 1440)      No acute intracranial abnormality                    Workstation performed: [id number]         VAS lower limb arterial duplex, complete bilateral    (Results Pending)   CT head wo contrast    (Results Pending)

## 2023-02-23 NOTE — PROGRESS NOTES
Caleb Nicole notified pt with persistent mouth breathing 95 % on room air, lethargy and  dry mucosal membranes whether need to obtain  Abg and mouth kote spray  Slim PA continue to monitor, no new orders

## 2023-02-24 PROBLEM — R78.81 BACTEREMIA: Status: ACTIVE | Noted: 2023-02-24

## 2023-02-24 LAB
ANION GAP SERPL CALCULATED.3IONS-SCNC: 5 MMOL/L (ref 4–13)
ATRIAL RATE: 111 BPM
ATRIAL RATE: 115 BPM
BUN SERPL-MCNC: 13 MG/DL (ref 5–25)
CALCIUM SERPL-MCNC: 10.7 MG/DL (ref 8.3–10.1)
CHLORIDE SERPL-SCNC: 113 MMOL/L (ref 96–108)
CO2 SERPL-SCNC: 25 MMOL/L (ref 21–32)
CREAT SERPL-MCNC: 0.78 MG/DL (ref 0.6–1.3)
ERYTHROCYTE [DISTWIDTH] IN BLOOD BY AUTOMATED COUNT: 14.2 % (ref 11.6–15.1)
GFR SERPL CREATININE-BSD FRML MDRD: 78 ML/MIN/1.73SQ M
GLUCOSE SERPL-MCNC: 129 MG/DL (ref 65–140)
HCT VFR BLD AUTO: 40.9 % (ref 34.8–46.1)
HGB BLD-MCNC: 13.8 G/DL (ref 11.5–15.4)
MCH RBC QN AUTO: 31.8 PG (ref 26.8–34.3)
MCHC RBC AUTO-ENTMCNC: 33.7 G/DL (ref 31.4–37.4)
MCV RBC AUTO: 94 FL (ref 82–98)
P AXIS: 35 DEGREES
P AXIS: 36 DEGREES
PHOSPHATE SERPL-MCNC: 2.4 MG/DL (ref 2.3–4.1)
PLATELET # BLD AUTO: 240 THOUSANDS/UL (ref 149–390)
PMV BLD AUTO: 10.6 FL (ref 8.9–12.7)
POTASSIUM SERPL-SCNC: 3.2 MMOL/L (ref 3.5–5.3)
PR INTERVAL: 162 MS
PR INTERVAL: 164 MS
QRS AXIS: 10 DEGREES
QRS AXIS: 11 DEGREES
QRSD INTERVAL: 92 MS
QRSD INTERVAL: 92 MS
QT INTERVAL: 324 MS
QT INTERVAL: 328 MS
QTC INTERVAL: 446 MS
QTC INTERVAL: 448 MS
RBC # BLD AUTO: 4.34 MILLION/UL (ref 3.81–5.12)
SODIUM SERPL-SCNC: 143 MMOL/L (ref 135–147)
T WAVE AXIS: 82 DEGREES
T WAVE AXIS: 90 DEGREES
VENTRICULAR RATE: 111 BPM
VENTRICULAR RATE: 115 BPM
WBC # BLD AUTO: 7.2 THOUSAND/UL (ref 4.31–10.16)

## 2023-02-24 RX ORDER — ACETAMINOPHEN 325 MG/1
650 TABLET ORAL EVERY 6 HOURS PRN
Status: DISCONTINUED | OUTPATIENT
Start: 2023-02-24 | End: 2023-02-28 | Stop reason: HOSPADM

## 2023-02-24 RX ORDER — POTASSIUM CHLORIDE 20MEQ/15ML
40 LIQUID (ML) ORAL ONCE
Status: COMPLETED | OUTPATIENT
Start: 2023-02-24 | End: 2023-02-24

## 2023-02-24 RX ORDER — VANCOMYCIN HYDROCHLORIDE 1 G/200ML
15 INJECTION, SOLUTION INTRAVENOUS EVERY 12 HOURS
Status: DISCONTINUED | OUTPATIENT
Start: 2023-02-24 | End: 2023-02-24 | Stop reason: DRUGHIGH

## 2023-02-24 RX ORDER — MAGNESIUM HYDROXIDE/ALUMINUM HYDROXICE/SIMETHICONE 120; 1200; 1200 MG/30ML; MG/30ML; MG/30ML
30 SUSPENSION ORAL EVERY 4 HOURS PRN
Status: DISCONTINUED | OUTPATIENT
Start: 2023-02-24 | End: 2023-02-28 | Stop reason: HOSPADM

## 2023-02-24 RX ORDER — VANCOMYCIN/0.9 % SOD CHLORIDE 750MG/.15L
750 PLASTIC BAG, INJECTION (ML) INTRAVENOUS EVERY 12 HOURS
Status: DISCONTINUED | OUTPATIENT
Start: 2023-02-25 | End: 2023-02-26

## 2023-02-24 RX ADMIN — METOPROLOL SUCCINATE 25 MG: 25 TABLET, EXTENDED RELEASE ORAL at 17:29

## 2023-02-24 RX ADMIN — LAMOTRIGINE 150 MG: 25 TABLET ORAL at 21:55

## 2023-02-24 RX ADMIN — SENNOSIDES AND DOCUSATE SODIUM 1 TABLET: 8.6; 5 TABLET ORAL at 17:29

## 2023-02-24 RX ADMIN — VANCOMYCIN HYDROCHLORIDE 1500 MG: 10 INJECTION, POWDER, LYOPHILIZED, FOR SOLUTION INTRAVENOUS at 21:56

## 2023-02-24 RX ADMIN — POTASSIUM CHLORIDE 40 MEQ: 1.5 SOLUTION ORAL at 15:04

## 2023-02-24 RX ADMIN — ENOXAPARIN SODIUM 40 MG: 40 INJECTION SUBCUTANEOUS at 08:30

## 2023-02-24 RX ADMIN — MORPHINE SULFATE 15 MG: 15 TABLET, FILM COATED, EXTENDED RELEASE ORAL at 17:29

## 2023-02-24 RX ADMIN — MORPHINE SULFATE 15 MG: 15 TABLET, FILM COATED, EXTENDED RELEASE ORAL at 08:30

## 2023-02-24 RX ADMIN — DULOXETINE HYDROCHLORIDE 60 MG: 60 CAPSULE, DELAYED RELEASE ORAL at 08:30

## 2023-02-24 RX ADMIN — GABAPENTIN 300 MG: 300 CAPSULE ORAL at 08:30

## 2023-02-24 RX ADMIN — ATORVASTATIN CALCIUM 40 MG: 40 TABLET, FILM COATED ORAL at 17:29

## 2023-02-24 RX ADMIN — METOPROLOL SUCCINATE 25 MG: 25 TABLET, EXTENDED RELEASE ORAL at 08:30

## 2023-02-24 RX ADMIN — DULOXETINE HYDROCHLORIDE 60 MG: 60 CAPSULE, DELAYED RELEASE ORAL at 21:55

## 2023-02-24 RX ADMIN — BUPROPION HYDROCHLORIDE TABLETS 100 MG: 100 TABLET, FILM COATED ORAL at 17:38

## 2023-02-24 RX ADMIN — BUPROPION HYDROCHLORIDE TABLETS 100 MG: 100 TABLET, FILM COATED ORAL at 08:31

## 2023-02-24 RX ADMIN — GABAPENTIN 300 MG: 300 CAPSULE ORAL at 17:29

## 2023-02-24 RX ADMIN — ASPIRIN 81 MG: 81 TABLET, COATED ORAL at 08:30

## 2023-02-24 RX ADMIN — ACETAMINOPHEN 650 MG: 325 TABLET ORAL at 22:12

## 2023-02-24 RX ADMIN — HYDROXYCHLOROQUINE SULFATE 200 MG: 200 TABLET ORAL at 08:31

## 2023-02-24 NOTE — PLAN OF CARE
Problem: OCCUPATIONAL THERAPY ADULT  Goal: Performs self-care activities at highest level of function for planned discharge setting  See evaluation for individualized goals  Description: Treatment Interventions: ADL retraining, Functional transfer training, Endurance training, Cognitive reorientation, Patient/family training, Equipment evaluation/education, Compensatory technique education, Energy conservation, Activityengagement          See flowsheet documentation for full assessment, interventions and recommendations  Note: Limitation: Decreased ADL status, Decreased endurance, Decreased self-care trans, Decreased high-level ADLs, Decreased cognition, Decreased Safe judgement during ADL  Prognosis: Good  Assessment: Pt is a 75 y o  female admitted 4/36/14 w acute metabolic encephalopathy  MRI revealing new small acute interval lacunar infarction in R cerebral peduncle  Pt w active OT eval and treat orders at this time  PMH includes  has a past medical history of Anxiety, Asthma, Bipolar depression ([facility] ), Chronic narcotic dependence ([facility] ), Chronic pain, Depression, Enterovirus heart infection, SHAR (obstructive sleep apnea), Osteoarthritis, Peripheral neuropathy, Plantar fasciitis of right foot, Senile osteoporosis, Seronegative arthropathy of multiple sites ([facility] ), and Undifferentiated connective tissue disease ([facility] )  Pt lives w brother and son in a 2 SH with 0 ATB, reporst walk in showre with grab bars, shower chair, standard toilet wit grab bars as well  Pt was admitted from [facility], however prior to that rehab stay, she was independent w/ ADL/IADL and used RW functional mobility, was  Driving  Wishes to get back to that level of function  Currently, pt is Min Ax1 for UB ADL, Mod Ax1 for LB ADL, and completed transfers/FM w Mod Ax1   Pt is limited at this time 2* decreased endurance/activtiy tolerance, decreased cognition, decreased ADL/High-level ADL status, decreased self-care trans, decreased safety awareness, limited home support and is a fall risk  This impacts pt's ability to complete UB and LB dressing and bathing, toileting, transfers, functional mobility, community mobility, home and health maintenance, and safe engagement in typical daily routine  The patient's raw score on the AM-PAC Daily Activity inpatient short form is 16, standardized score is 35 96, less than 39 4  Patients at this level are likely to benefit from discharge to post-acute rehabilitation services  Please refer to the recommendation of the Occupational Therapist for safe discharge planning  From OT standpoint, pt should D/C to STR when medically stable  Pt will benefit from continued acute OT services 2-3 x/wk for 10-14 days to meet goals       OT Discharge Recommendation: Post acute rehabilitation services

## 2023-02-24 NOTE — ASSESSMENT & PLAN NOTE
· Pt endorses abdominal pain, nausea now resolved  · S/p CT imaging ?  Colonic thickening but no clinical correlation with colitis

## 2023-02-24 NOTE — ASSESSMENT & PLAN NOTE
· Presents with AMS from outpatient facility recently admitted to [facility] for CVA where she underwent revascularization of a right m1 occlusion (Dr Steven Officer 2/12/2023),  · Etiology likely secondary to acute cva  S/p MRI revealing: new small acute /more recent interval lacunar infarction in the right cerebral peduncle  Versus questionable seizure event  · Also possibly component of metabolic etiology given hypercalcemia though had occurred intermittently in the past   · Neurology on board  · Status post EEG    No initiation of antiepileptic medications per neurology  · Cont neuro checks  · MS appears much improved  · S/p speech eval cleared for pureed with thin liquid

## 2023-02-24 NOTE — ASSESSMENT & PLAN NOTE
· POA and has had intermittent elevations in the past  · Status post nephrology consult  Non suppressed PTH thus concern for primary hyperparathyroidism  · Status post endocrinology consult, pending SPEP and vitamin D levels    Continue to work-up as outpatient  · S/p CT C/A/P no occult mass to suggest underlying malignancy

## 2023-02-24 NOTE — ASSESSMENT & PLAN NOTE
· Noted history   · PDMP reviewed on MS contin 15 mg BID   · Per daughter would prefer to avoid additional narcotics

## 2023-02-24 NOTE — CONSULTS
Consultation - Infectious Disease   Padmini Snell 75 y o  female MRN: [id number]  Unit/Bed#: [facility] 628-01 Encounter: [id number]      IMPRESSION & RECOMMENDATIONS:   1  Acute encephalopathy  Patient presented from outpatient facility acutely confused CT of the head unremarkable and MRI showing some new small acute lacunar infarcts  Her mental status improved left rapidly within 24 hours of presentation  Potential sources include questionable seizure event, metabolic abnormalities given 5, or related to pain medications  Patient of the course of admission has had no fever and no white count and so overall suspicion initially from other providers that even now remains low for infection  Course now complicated by 2  Continue to monitor mental status  Antibiotics as below for now  Follow-up pending cultures  Continue to trend fever curve/vitals  Neurology evaluation appreciated  Low threshold for repeat imaging  Additional supportive care as per primary  Additional interventions pending clinical course    2  Gram-positive bacteremia  Patient had blood cultures on admission and now after being negative for 24 hours have returned positive with gram-positive cocci in clusters  Both appear to be in the aerobic bottle  Patient self reports being a difficult access  She also has had no fever or white count over the course of this admission  She has no other prior significant culture data  She did however have 3 and so uncertain if related  Suspicion remains for possible culture contamination    Follow-up repeat cultures collected today, prior to vancomycin  Continue to trend fever curve/vitals  Repeat CBC and chemistry tomorrow  We will obtain UA and urine culture prior to vancomycin  Would obtain outside records of additional antibiotics if possible  Monitor abdominal exam  Monitor skin exam  Follow-up pending culture identification and susceptibilities  If repeat cultures are negative and patient stable would discontinue further antibiotic  Additional cares per primary    3  Recent dysuria  Both patient and family report history of dysuria and frequency of urination with incontinence at facility  Uncertain of timeline and there is question of patient receiving Keflex as outpatient  Currently without symptoms  Will obtain UA and urine culture above  Antibiotic as above for now  Monitor abdominal exam  Obtain outside records if possible    4  Recent CVA  Patient recently had CVA requiring thrombectomy  Now on repeat imaging has some small lacunar infarct  Overall mental status has improved  Plans for repeat CT and ongoing follow-up by neurology  5   Hypercalcemia  Patient noted with elevated calcium on presentation, I suspect that this may have been largely contributing to her episode of confusion especially if it was leading to dehydration  Lower suspicion for infection  Antibiotic as above for now  Ongoing followed by endocrinology  Ongoing follow-up by nephrology  Repeat CBC/chemistry tomorrow  Ongoing fluid hydration    Above plan discussed in detail with the patient, primary service attending and her brother at bedside  ID consult service will continue to follow  I have spent a total time of 80 minutes on 02/24/23 in caring for this patient including Diagnostic results, Impressions, Documenting in the medical record, Reviewing / ordering tests, medicine, procedures  , Obtaining or reviewing history   and Communicating with other healthcare professionals   HISTORY OF PRESENT ILLNESS:  Reason for Consult: Bacteremia    HPI: Sydney Lopez is a 75y o  year old female with asthma, anxiety, bipolar depression, chronic narcotic use, previous enteroviral infection of her heart, seronegative arthropathy in multiple joints, undifferentiated connective tissue disorder, obstructive sleep apnea  Multiple surgical procedures noted below  No intravascular devices noted    Patient recently had a CVA on 2/12 for which she underwent thrombectomy  She presented to the ER on 2/22 with altered mental status  Her brother is present at bedside today to provide additional history and he reports that she seemed agitated at facility and was fidgeting constantly  Initial reports in the ER mention that the patient was just off  She reportedly had some jerking movements as well and at baseline since stroke she has left-sided weakness along with dysarthria  Patient was without leukocytosis and chemistry unremarkable  LFTs unremarkable  CT of the head was done without any new intracranial abnormality  Patient was ultimately admitted for altered mental status and she was noted to have some improvement initially with Narcan  There was question of possible underlying seizure activity  Neurology was consulted  Patient was ordered for MRI of the brain as well as EEG  There was also questionable discoloration of her feet and possibility of cellulitis so she was placed on Ancef empirically  Mental status seem to improve in less than 24 hours  Patient was also seen by nephrology for hypercalcemia and there was question for possibility of dehydration contributing then to her presentation  Patient currently on a modified diet by speech therapy  Patient since admission was also seen by endocrinology and has now been diagnosed with suspected primary hyperparathyroidism  MRI of the brain was ultimately completed which showed a new small acute lacunar infarct and again extensive multifocal change involving the MCA infarct  EEG only showed generalized encephalopathy/slowing  Overall again exam noted to improve  She is being recommended for repeat CT of the head tomorrow to evaluate further anticoagulation needs  On my evaluation the patient denies having any nausea, vomiting, chest pain or shortness of breath  Had tolerated antibiotics without issue  Has a penicillin allergy at baseline    Again she had no fevers over admission and white count unremarkable  2 of 2 blood cultures had delayed growth but returned positive with gram-positive cocci in clusters  On further questioning the patient reports having difficulty with access at baseline  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  She denies any significant dysuria, hematuria, pain in the lower abdomen, nausea, vomiting, chest pain or shortness of breath  Her brother is also present at bedside and he reports that patient was diagnosed with a urinary tract infection prior to presentation  The patient recalls having dysuria and discomfort with urination  Attempted to review EMS records and family believes that the patient was started on Keflex but uncertain when and how long  We are consulted at this time for further assistance with management given the patient's positive blood cultures here  REVIEW OF SYSTEMS:  A complete 12 point system-based review of systems is negative other than that noted in the HPI      PAST MEDICAL HISTORY:  Past Medical History:   Diagnosis Date   • Anxiety    • Asthma    • Bipolar depression ([facility] )    • Chronic narcotic dependence ([facility] )    • Chronic pain    • Depression    • Enterovirus heart infection    • SHAR (obstructive sleep apnea)    • Osteoarthritis    • Peripheral neuropathy    • Plantar fasciitis of right foot    • Senile osteoporosis    • Seronegative arthropathy of multiple sites ([facility] )    • Undifferentiated connective tissue disease ([facility] )      Past Surgical History:   Procedure Laterality Date   • BREAST IMPLANT     • FIXATION KYPHOPLASTY LUMBAR SPINE     • HYSTERECTOMY     • INCONTINENCE SURGERY N/A    • IR STROKE ALERT  2/12/2023   • NASAL SEPTOPLASTY W/ TURBINOPLASTY N/A    • RECTAL PROLAPSE REPAIR N/A    • REPAIR RECTOCELE     • RHINOPLASTY N/A        FAMILY HISTORY:  Non-contributory    SOCIAL HISTORY:  Social History   Social History     Substance and Sexual Activity   Alcohol Use Not Currently    Comment: quit     Social History     Substance and Sexual Activity   Drug Use Yes   • Types: Marijuana    Comment: has medical card     Social History     Tobacco Use   Smoking Status Former   Smokeless Tobacco Never       ALLERGIES:  Allergies   Allergen Reactions   • Dye [Iodinated Contrast Media] Anaphylaxis     IVP dye   • Bactrim [Sulfamethoxazole-Trimethoprim] Hives   • Erythromycin Hives   • Penicillins        MEDICATIONS:  All current active medications have been reviewed  PHYSICAL EXAM:  Temp:  [97 6 °F (36 4 °C)-98 6 °F (37 °C)] 98 6 °F (37 °C)  HR:  [107-121] 121  Resp:  [16-20] 18  BP: (132-150)/(83-94) 132/88  SpO2:  [93 %-100 %] 96 %  Temp (24hrs), Av 3 °F (36 8 °C), Min:97 6 °F (36 4 °C), Max:98 6 °F (37 °C)  Current: Temperature: 98 6 °F (37 °C)    Intake/Output Summary (Last 24 hours) at 2023 1326  Last data filed at 2023 0500  Gross per 24 hour   Intake 668 ml   Output 300 ml   Net 368 ml       General Appearance:  Appearing well, nontoxic, and in no distress; chronically ill-appearing  Noted to have some dysarthria and facial weakness  Head:  Normocephalic, without obvious abnormality, atraumatic   Eyes:  Conjunctiva pink and sclera anicteric, both eyes   Nose: Nares normal, mucosa normal, no drainage   Throat: Oropharynx moist without lesions   Neck: Supple, symmetrical, no adenopathy, no tenderness/mass/nodules   Back:   Symmetric, ROM normal, no CVA tenderness; noted to have a forward curvature; no spinal or paraspinal muscle tenderness to palpation   Lungs:   Clear to auscultation bilaterally, respirations unlabored   Chest Wall:  No tenderness or deformity   Heart:  RRR; no murmur, rub or gallop appreciated   Abdomen:   Soft, non-tender, non-distended, positive bowel sounds; no suprapubic discomfort   Extremities: No cyanosis, clubbing or edema   Skin: No rashes or lesions  No draining wounds noted    Chronic discoloration of the soles of the feet; previous groin access sites from her prior thrombectomy are unremarkable  There is a small palpable hematoma in the right groin but there is no significant pain or cellulitic changes  Lymph nodes: Cervical, supraclavicular nodes normal   Neurologic: Alert and oriented times 3, was able to visualize the patient walking with nursing from bathroom to her chair  LABS, IMAGING, & OTHER STUDIES:  In completing this consult I have performed an extensive review of the medical records in epic including review of the notes, radiographs, and laboratory results as detailed below  Lab Results:  I have personally reviewed pertinent labs  Comments/Interpretations: CBC has been unremarkable since presentation  Chemistry notable for the prior elevation in calcium and then subsequent partial decline in her creatinine also noted  Results from last 7 days   Lab Units 02/24/23  0422 02/23/23  0452 02/22/23 2004 02/22/23  1405   WBC Thousand/uL 7 20 7 20  --  7 80   HEMOGLOBIN g/dL 13 8 14 3  --  13 6   PLATELETS Thousands/uL 240 261 248 270     Results from last 7 days   Lab Units 02/24/23  0422 02/23/23  0452 02/22/23  1400   POTASSIUM mmol/L 3 2* 4 1 4 4   CHLORIDE mmol/L 113* 112* 111*   CO2 mmol/L 25 23 29   BUN mg/dL 13 13 15   CREATININE mg/dL 0 78 0 84 0 93   EGFR ml/min/1 73sq m 78 71 63   CALCIUM mg/dL 10 7* 11 1* 10 9*   AST U/L  --  22 26   ALT U/L  --  19 20   ALK PHOS U/L  --  48 49     Results from last 7 days   Lab Units 02/22/23  1405 02/22/23  1402   GRAM STAIN RESULT  Gram positive cocci in clusters* Gram positive cocci in clusters*       Imaging Studies:   I have personally reviewed pertinent imaging study reports and images in PACS  Comments/Interpretations:  CT chest abdomen pelvis without contrast only shows some questionable colonic wall thickening although likely more from under distention  There is a small postprocedural hematoma on the right femoral area  MRI of the head reviewed above        Other Studies:   I have personally reviewed other pertinent reports as below  Records in [facility]: No recent culture data in [facility]  Nursing home/EMS Records: Reviewed EMS records which mention there was concern from staff about aspiration  There is no mention of recent diagnosis of UTI and unfortunately there is no recent [facility] for review  Current/Prior Cultures: 2 of 2 cultures notable for gram-positive cocci in clusters discussed with micro lab they are in the aerobic bottle  Potentially micrococcus

## 2023-02-24 NOTE — PROGRESS NOTES
NEUROLOGY RESIDENCY PROGRESS NOTE     Name: Nimrala Hastings   Age & Sex: 75 y o  female   MRN: [id number]  Unit/Bed#: [facility] 628-01   Encounter: [id number]    Nirmala Hastings will need follow up in in 6 weeks with neurovascular Attending, Resident or AP  She will not require outpatient neurological testing  ASSESSMENT & PLAN     Acute CVA (cerebrovascular accident) [facility])  Assessment & Plan  Assessment:   Nirmala Hastings is a 75 y o  female with a pertinent medical history consisting of recent CVA 2/12/22 ( R  MCA s/p M1 thrombectomy TICI 3)  Anxiety, Bipolar disorder, Chronic panic syndrome on MS contin, RA, HF  who initially presented to the [facility] ED on 2/22 with AMS   On arrival to the ED her initial SBP was 117/84 and tachycardiac to 125, Initial Labs consisting of Procalcitonin, troponin, Coma panel, Ethanol, CBC, CMP and Lipase were within the normal limits  CTH did no reveal any acute intracranial pathology  She received a dose of 0 5 Ativan and 2 mg Narcan in the ED  MRI brain: new small acute /more recent interval lacunar infarction in the right cerebral peduncle  Routine EEG with  moderate generalized non-specific encephalopathy and more significant right hemispheric non-specific cerebral dysfunction  No electrographic seizures or interictal epileptiform discharges    Impression: Unclear etiology of initial presentation, lower suspicion for seizure, likely TME contributing to AMS initially   Mri reveals new stroke, pending repeat CTH she would benefit from [facility] as previously recommended given suspicion of Cardio-embolic etiology for initial infarct     Plan:  · Plan to repeat [facility] on 2/25 if negative would recommend discontinung ASA 81 and starting ELiquis 5 mg BID   · Continue atorvastatin 40 mg daily  · Seizure precautions  · Goal euglycemia, normothermia  · Telemetry  · Frequent neurochecks  · PT/OT/speech  · STAT CT head with any acute change in neurologic exam  · Medical management supportive care per primary team, notify with changes        SUBJECTIVE     Patient was seen and examined  No acute events overnight  Mental status significantly improved this morning  Patient was sitting in her chair eating breakfast comfortably  Her daughter was at bedside and all questions were addressed  Review of Systems   Constitutional: Negative  Negative for activity change and appetite change  HENT: Negative  Negative for trouble swallowing  Eyes: Negative for photophobia and visual disturbance  Respiratory: Negative for shortness of breath  Gastrointestinal: Negative  Negative for nausea and vomiting  Genitourinary: Negative  Musculoskeletal: Negative  Negative for neck pain  Skin: Negative  Neurological: Positive for speech difficulty and weakness  Negative for dizziness, tremors, seizures, syncope, facial asymmetry, light-headedness, numbness and headaches  Psychiatric/Behavioral: Negative for behavioral problems and confusion  All other systems reviewed and are negative  OBJECTIVE     Patient ID: Zondra Apley is a 75 y o  female  Vitals:    23 0245 23 0253 23 0630 23 1126   BP: 143/94 140/93 140/93 132/88   Pulse: (!) 119 (!) 121 (!) 115 (!) 121   Resp:  19 20 18   Temp: 98 4 °F (36 9 °C) 98 3 °F (36 8 °C) 98 6 °F (37 °C)    TempSrc:       SpO2: 100% 100% 98% 96%      Temperature:   Temp (24hrs), Av 3 °F (36 8 °C), Min:97 6 °F (36 4 °C), Max:98 6 °F (37 °C)    Temperature: 98 6 °F (37 °C)      Physical Exam  Vitals and nursing note reviewed  HENT:      Head: Normocephalic and atraumatic  Nose: Nose normal    Eyes:      Extraocular Movements: Extraocular movements intact  Pupils: Pupils are equal, round, and reactive to light  Pulmonary:      Effort: Pulmonary effort is normal    Musculoskeletal:      Cervical back: Normal range of motion  Skin:     General: Skin is warm     Neurological:      Mental Status: She is alert and oriented to person, place, and time  Cranial Nerves: Cranial nerves 2-12 are intact  Psychiatric:         Speech: Speech is slurred  Neurologic Exam     Mental Status   Oriented to person, place, and time  Follows 2 step commands  Speech: slurred   Level of consciousness: alert  Able to name object  Able to repeat  Cranial Nerves   Cranial nerves II through XII intact  CN III, IV, VI   Pupils are equal, round, and reactive to light  Motor Exam     Strength   Strength 5/5 except as noted  Left hand  4/5     Sensory Exam   Light touch normal         LABORATORY DATA     Labs: I have personally reviewed pertinent reports  Results from last 7 days   Lab Units 02/24/23 0422 02/23/23 0452 02/22/23 2004 02/22/23  1405   WBC Thousand/uL 7 20 7 20  --  7 80   HEMOGLOBIN g/dL 13 8 14 3  --  13 6   HEMATOCRIT % 40 9 43 8  --  41 4   PLATELETS Thousands/uL 240 261 248 270   NEUTROS PCT %  --  75  --  67   MONOS PCT %  --  10  --  13*      Results from last 7 days   Lab Units 02/24/23 0422 02/23/23 0452 02/22/23  1400   SODIUM mmol/L 143 139 142   POTASSIUM mmol/L 3 2* 4 1 4 4   CHLORIDE mmol/L 113* 112* 111*   CO2 mmol/L 25 23 29   BUN mg/dL 13 13 15   CREATININE mg/dL 0 78 0 84 0 93   CALCIUM mg/dL 10 7* 11 1* 10 9*   ALK PHOS U/L  --  48 49   ALT U/L  --  19 20   AST U/L  --  22 26         Results from last 7 days   Lab Units 02/24/23  0422   PHOSPHORUS mg/dL 2 4      Results from last 7 days   Lab Units 02/22/23  1400   INR  0 95   PTT seconds 29     Results from last 7 days   Lab Units 02/22/23  1400   LACTIC ACID mmol/L 1 4           IMAGING & DIAGNOSTIC TESTING     Radiology Results: I have personally reviewed pertinent reports  VAS lower limb arterial duplex, complete bilateral   Final Result by Adis Longoria MD (02/23 5251)      CT chest abdomen pelvis wo contrast   Final Result by Tiara Medina MD (02/23 9702)      1    Mild questionable colonic wall thickening, which may be related to underdistention versus mild colitis in the appropriate clinical setting  2   Small postprocedural hematoma measuring up to 2 cm adjacent to the right femoral vessels  3   No acute abnormality in the chest             Workstation performed: [id number]         MRI brain wo contrast   Final Result by Davis Navarro MD (02/22 2219)         1  New small acute /more recent interval lacunar infarction in the right cerebral peduncle (series 4, image 14)  2   Extensive multifocal large, previously present right middle cerebral artery infarction which is evolving with superimposed areas of evolving hemorrhage likely related to hemorrhagic transformation of subacute right MCA infarction  No significant    mass effect  3   Mild, chronic microangiopathy  Workstation performed: [id number]         CT head without contrast   Final Result by Sayda Stevens MD (02/22 1440)      No acute intracranial abnormality  Workstation performed: [id number]         CT head wo contrast    (Results Pending)       Other Diagnostic Testing: I have personally reviewed pertinent reports        ACTIVE MEDICATIONS     Current Facility-Administered Medications   Medication Dose Route Frequency   • acetaminophen (TYLENOL) tablet 650 mg  650 mg Oral Q6H PRN   • albuterol (PROVENTIL HFA,VENTOLIN HFA) inhaler 2 puff  2 puff Inhalation Q6H PRN   • aluminum-magnesium hydroxide-simethicone (MYLANTA) oral suspension 30 mL  30 mL Oral Q4H PRN   • aspirin (ECOTRIN LOW STRENGTH) EC tablet 81 mg  81 mg Oral Daily   • atorvastatin (LIPITOR) tablet 40 mg  40 mg Oral QPM   • buPROPion (WELLBUTRIN) tablet 100 mg  100 mg Oral BID   • DULoxetine (CYMBALTA) delayed release capsule 60 mg  60 mg Oral BID   • enoxaparin (LOVENOX) subcutaneous injection 40 mg  40 mg Subcutaneous Daily   • gabapentin (NEURONTIN) capsule 300 mg  300 mg Oral BID   • hydroxychloroquine (PLAQUENIL) tablet 200 mg  200 mg Oral Once per day on Mon Tue Wed Thu Fri   • [START ON 2/25/2023] hydroxychloroquine (PLAQUENIL) tablet 400 mg  400 mg Oral Once per day on Sun Sat   • lamoTRIgine (LaMICtal) tablet 150 mg  150 mg Oral HS   • metoprolol succinate (TOPROL-XL) 24 hr tablet 25 mg  25 mg Oral BID   • morphine (MS CONTIN) ER tablet 15 mg  15 mg Oral BID   • polyethylene glycol (MIRALAX) packet 17 g  17 g Oral Daily PRN   • potassium chloride oral solution 40 mEq  40 mEq Oral Once   • senna-docusate sodium (SENOKOT S) 8 6-50 mg per tablet 1 tablet  1 tablet Oral BID   • [START ON 2/25/2023] vancomycin (VANCOCIN) 1,250 mg in sodium chloride 0 9 % 250 mL IVPB  1,250 mg Intravenous Q24H   • vancomycin (VANCOCIN) 1500 mg in sodium chloride 0 9% 250 mL IVPB  25 mg/kg Intravenous Once       Prior to Admission medications    Medication Sig Start Date End Date Taking? Authorizing Provider   albuterol (PROVENTIL HFA,VENTOLIN HFA) 90 mcg/act inhaler Inhale 2 puffs every 6 (six) hours as needed for wheezing    Historical Provider, MD   atorvastatin (LIPITOR) 40 mg tablet Take 1 tablet (40 mg total) by mouth every evening 2/16/23   Joseline Wei MD   buPROPion [facility]) 100 mg tablet Take 100 mg by mouth 2 (two) times a day    Historical Provider, MD   DULOXETINE HCL PO Take 60 mg by mouth 2 (two) times a day    Megan Amado DO   gabapentin (NEURONTIN) 300 mg capsule Take 1 capsule (300 mg total) by mouth 2 (two) times a day 6/28/21   Tomasa Meehan MD   hydroxychloroquine (PLAQUENIL) 200 mg tablet Take 1 tablet (200mg) every Mon, Tues, Wed, Thurs, and Fri   Take 2 tablets (400mg) every Sat and Sun  12/29/22 6/28/23  Galina Regan MD   LamoTRIgine (LAMICTAL PO) Take 1 tablet by mouth daily at bedtime pt taking 150mg daily at bedtime     Historical Provider, MD   metoprolol succinate (TOPROL-XL) 25 mg 24 hr tablet Take 1 tablet (25 mg total) by mouth 2 (two) times a day 2/16/23   Joseline Wei MD   morphine (MS CONTIN) 15 mg 12 hr tablet Take 1 tablet (15 mg total) by mouth 2 (two) times a day Max Daily Amount: 30 mg 2/16/23   Kelvin Mei MD   polyethylene glycol (MIRALAX) 17 g packet Take 17 g by mouth daily as needed (Constipation) 3/8/22   Emilee Urban PA-C   raNITIdine HCl (ZANTAC PO) Take by mouth    Historical Provider, MD   senna-docusate sodium (SENOKOT S) 8 6-50 mg per tablet Take 1 tablet by mouth 2 (two) times a day 3/8/22   Emilee Urban PA-C         ==  MD Panch[facility] [address] [facility] Neurology Residency, PGY-4

## 2023-02-24 NOTE — PLAN OF CARE
Problem: PHYSICAL THERAPY ADULT  Goal: Performs mobility at highest level of function for planned discharge setting  See evaluation for individualized goals  Description: Treatment/Interventions: Functional transfer training, LE strengthening/ROM, Therapeutic exercise, Endurance training, Patient/family training, Equipment eval/education, Bed mobility, Gait training, Spoke to nursing, OT  Equipment Recommended: Sam [place]       See flowsheet documentation for full assessment, interventions and recommendations  Note: Prognosis: Good  Problem List: Decreased strength, Decreased endurance, Impaired balance, Decreased mobility, Decreased range of motion, Pain  Assessment: Pt is 75 y o  female seen for PT evaluation s/p admit to [facility] on 2/22/2023  Two pt identifiers were used to confirm  Pt presented w/ altered mental status  Pt was admitted with a primary dx of: Acute metabolic encephalopathy, and other active problems including acute CVA, abdominal pain, discoloration of skin of foot, hypercalcemia, heart failure with reduced ejection fraction, HTN, rheumatoid arthritis, bipolar depression, chronic pain disorder  PT now consulted for assessment of mobility and d/c needs  Pt with Up with assistance orders  Pts current co morbidities affecting treatment include:  has a past medical history of Anxiety, Asthma, Bipolar depression ([address] ), Chronic narcotic dependence ([address] ), Chronic pain, Depression, Enterovirus heart infection, SHAR (obstructive sleep apnea), Osteoarthritis, Peripheral neuropathy, Plantar fasciitis of right foot, Senile osteoporosis, Seronegative arthropathy of multiple sites ([address] ), and Undifferentiated connective tissue disease ([facility] )    Pts current clinical presentation is Unstable/ Unpredictable (high complexity) due to Ongoing medical management for primary dx, Increased reliance on more restrictive AD compared to baseline, Decreased activity tolerance compared to baseline, Fall risk, Increased assistance needed from caregiver at current time, Continuous pulse oximetry monitoring     Upon evaluation, pt currently is requiring Mod Ax1 for bed mobility; Mod Ax1 for transfers and Mod Ax1 for ambulation w/ RW  Pt presents at PT eval functioning below baseline and currently w/ overall mobility deficits 2* to: BLE weakness, impaired balance, decreased endurance, gait deviations, pain, decreased activity tolerance compared to baseline, fall risk  Pt currently at a fall risk 2* to impairments listed above  Based on the aforementioned PT evaluation, pt will continue to benefit from skilled Acute PT interventions to address stated impairments; to maximize functional mobility; for ongoing pt/ family training; and DME needs  At conclusion of PT session pt returned back in chair and chair alarm engaged with phone and call bell within reach  Pt denies any further questions at this time  PT is currently recommending Rehab  PT will continue to follow during hospital stay  PT Discharge Recommendation: Post acute rehabilitation services    See flowsheet documentation for full assessment

## 2023-02-24 NOTE — ASSESSMENT & PLAN NOTE
Assessment:   Elysia Sosa is a 75 y o  female with a pertinent medical history consisting of recent CVA 2/12/22 ( R  MCA s/p M1 thrombectomy TICI 3)  Anxiety, Bipolar disorder, Chronic panic syndrome on MS contin, RA, HF  who initially presented to the [facility]"" ED on 2/22 with AMS   On arrival to the ED her initial SBP was 117/84 and tachycardiac to 125, Initial Labs consisting of Procalcitonin, troponin, Coma panel, Ethanol, CBC, CMP and Lipase were within the normal limits  CTH did no reveal any acute intracranial pathology  She received a dose of 0 5 Ativan and 2 mg Narcan in the ED  MRI brain: new small acute /more recent interval lacunar infarction in the right cerebral peduncle  Routine EEG with  moderate generalized non-specific encephalopathy and more significant right hemispheric non-specific cerebral dysfunction  No electrographic seizures or interictal epileptiform discharges    Impression: Unclear etiology of initial presentation, lower suspicion for seizure, likely TME contributing to AMS initially   Mri reveals new stroke, pending repeat CTH she would benefit from [facility] as previously recommended given suspicion of Cardio-embolic etiology for initial infarct     Plan:  · Plan to repeat [facility] on 2/25 if negative would recommend discontinung ASA 81 and starting ELiquis 5 mg BID   · Continue atorvastatin 40 mg daily  · Seizure precautions  · Goal euglycemia, normothermia  · Telemetry  · Frequent neurochecks  · PT/OT/speech  · STAT CT head with any acute change in neurologic exam  · Medical management supportive care per primary team, notify with changes

## 2023-02-24 NOTE — PLAN OF CARE
Problem: Nutrition/Hydration-ADULT  Goal: Nutrient/Hydration intake appropriate for improving, restoring or maintaining nutritional needs  Description: Monitor and assess patient's nutrition/hydration status for malnutrition  Collaborate with interdisciplinary team and initiate plan and interventions as ordered  Monitor patient's weight and dietary intake as ordered or per policy  Utilize nutrition screening tool and intervene as necessary  Determine patient's food preferences and provide high-protein, high-caloric foods as appropriate       INTERVENTIONS:  - Monitor oral intake, urinary output, labs, and treatment plans  - Assess nutrition and hydration status and recommend course of action  - Evaluate amount of meals eaten  - Assist patient with eating if necessary   - Allow adequate time for meals  - Recommend/ encourage appropriate diets, oral nutritional supplements, and vitamin/mineral supplements  - Order, calculate, and assess calorie counts as needed  - Recommend, monitor, and adjust tube feedings and TPN/PPN based on assessed needs  - Assess need for intravenous fluids  - Provide specific nutrition/hydration education as appropriate  - Include patient/family/caregiver in decisions related to nutrition  Outcome: Progressing     Problem: PAIN - ADULT  Goal: Verbalizes/displays adequate comfort level or baseline comfort level  Description: Interventions:  - Encourage patient to monitor pain and request assistance  - Assess pain using appropriate pain scale  - Administer analgesics based on type and severity of pain and evaluate response  - Implement non-pharmacological measures as appropriate and evaluate response  - Consider cultural and social influences on pain and pain management  - Notify physician/advanced practitioner if interventions unsuccessful or patient reports new pain  Outcome: Progressing     Problem: INFECTION - ADULT  Goal: Absence or prevention of progression during hospitalization  Description: INTERVENTIONS:  - Assess and monitor for signs and symptoms of infection  - Monitor lab/diagnostic results  - Monitor all insertion sites, i e  indwelling lines, tubes, and drains  - Monitor endotracheal if appropriate and nasal secretions for changes in amount and color  - [place] appropriate cooling/warming therapies per order  - Administer medications as ordered  - Instruct and encourage patient and family to use good hand hygiene technique  - Identify and instruct in appropriate isolation precautions for identified infection/condition  Outcome: Progressing     Problem: SAFETY ADULT  Goal: Patient will remain free of falls  Description: INTERVENTIONS:  - Educate patient/family on patient safety including physical limitations  - Instruct patient to call for assistance with activity   - Consult OT/PT to assist with strengthening/mobility   - Keep Call bell within reach  - Keep bed low and locked with side rails adjusted as appropriate  - Keep care items and personal belongings within reach  - Initiate and maintain comfort rounds  - Make Fall Risk Sign visible to staff  - Offer Toileting every 2 Hours, in advance of need  - Initiate/Maintain fall alarm  - Obtain necessary fall risk management equipment:   - Apply yellow socks and bracelet for high fall risk patients  - Consider moving patient to room near nurses station  Outcome: Progressing  Goal: Maintain or return to baseline ADL function  Description: INTERVENTIONS:  -  Assess patient's ability to carry out ADLs; assess patient's baseline for ADL function and identify physical deficits which impact ability to perform ADLs (bathing, care of mouth/teeth, toileting, grooming, dressing, etc )  - Assess/evaluate cause of self-care deficits   - Assess range of motion  - Assess patient's mobility; develop plan if impaired  - Assess patient's need for assistive devices and provide as appropriate  - Encourage maximum independence but intervene and supervise when necessary  - Involve family in performance of ADLs  - Assess for home care needs following discharge   - Consider OT consult to assist with ADL evaluation and planning for discharge  - Provide patient education as appropriate  Outcome: Progressing  Goal: Maintains/Returns to pre admission functional level  Description: INTERVENTIONS:  - Perform BMAT or MOVE assessment daily    - Set and communicate daily mobility goal to care team and patient/family/caregiver  - Collaborate with rehabilitation services on mobility goals if consulted  - Perform Range of Motion PRN  - Reposition patient every 2 hours  - Dangle patient 2 times a day  - Stand patient 2 times a day  - Ambulate patient 2 times a day  - Out of bed to chair 2 times a day   - Out of bed for meals 2 times a day  - Out of bed for toileting  - Record patient progress and toleration of activity level   Outcome: Progressing     Problem: DISCHARGE PLANNING  Goal: Discharge to home or other facility with appropriate resources  Description: INTERVENTIONS:  - Identify barriers to discharge w/patient and caregiver  - Arrange for needed discharge resources and transportation as appropriate  - Identify discharge learning needs (meds, wound care, etc )  - Arrange for interpretive services to assist at discharge as needed  - Refer to Case Management Department for coordinating discharge planning if the patient needs post-hospital services based on physician/advanced practitioner order or complex needs related to functional status, cognitive ability, or social support system  Outcome: Progressing     Problem: Knowledge Deficit  Goal: Patient/family/caregiver demonstrates understanding of disease process, treatment plan, medications, and discharge instructions  Description: Complete learning assessment and assess knowledge base    Interventions:  - Provide teaching at level of understanding  - Provide teaching via preferred learning methods  Outcome: Progressing

## 2023-02-24 NOTE — SPEECH THERAPY NOTE
Speech Language/Pathology    Speech/Language Pathology Progress Note    Patient Name: Tesfaye Tovar  ETKOG'Q Date: 2/24/2023     Subjective:  "I'm tolerating it" Patient awake and alert today  Objective: The patient is seen for dysphagia therapy  She continues with left side facial droop and weakness  The patient completes oral care with swabs and suction kit  She is agreeable to pm snack and is assessed with ice cream, nectar thick liquids and thin liquids  She can feed herself with assistance to hold ice cream cup  Bite size is large  Retrieval via tsp is adequate  Transfer is timely  The patient has intermittent wet vocal quality with ice cream  She tolerates nectar thick liquids without overt s/s aspiration and is observed with cough x1 (of 4) sips thin liquids  VSS throughout  Assessment:  The patient appears to be tolerating thin liquids better today  Plan/Recommendations:  Recommend diet change to puree with thin liquids  Continue ST to further assess tolerance

## 2023-02-24 NOTE — ASSESSMENT & PLAN NOTE
· Unclear etiology, presenting with erythremia  Now resolved    Her daughter notes her skin is typically "blue"  · Low procal, afebrile, no leukocytosis thus doubt infectious process thus will monitored off abx  · S/p LE arterial duplex no evidence of significant arterial occulusive disease

## 2023-02-24 NOTE — ASSESSMENT & PLAN NOTE
· 2 out of 2 sets GPC  · ID on board, appreciate input  · Question if contamination  · Monitoring off antibiotics  · Follow-up repeat blood cultures

## 2023-02-24 NOTE — OCCUPATIONAL THERAPY NOTE
Occupational Therapy Evaluation     Patient Name: Farnaz ANTONIO Date: 2/24/2023  Problem List  Principal Problem:    Acute metabolic encephalopathy  Active Problems:    Chronic pain syndrome    Bipolar depression (HCC)    Rheumatoid arthritis (HCC)    HTN (hypertension)    HFrEF (heart failure with reduced ejection fraction) (HCC)    Hypercalcemia    Discoloration of skin of foot    Abdominal pain    Acute CVA (cerebrovascular accident) [facility])    Past Medical History  Past Medical History:   Diagnosis Date    Anxiety     Asthma     Bipolar depression (HCC)     Chronic narcotic dependence (HCC)     Chronic pain     Depression     Enterovirus heart infection     SHAR (obstructive sleep apnea)     Osteoarthritis     Peripheral neuropathy     Plantar fasciitis of right foot     Senile osteoporosis     Seronegative arthropathy of multiple sites ([facility] [address] )     Undifferentiated connective tissue disease ([facility] [address] )      Past Surgical History  Past Surgical History:   Procedure Laterality Date    BREAST IMPLANT      FIXATION KYPHOPLASTY LUMBAR SPINE      HYSTERECTOMY      INCONTINENCE SURGERY N/A     IR STROKE ALERT  2/12/2023    NASAL SEPTOPLASTY W/ TURBINOPLASTY N/A     RECTAL PROLAPSE REPAIR N/A     REPAIR RECTOCELE      RHINOPLASTY N/A          02/24/23 0833   OT Last Visit   OT Visit Date 02/24/23   Note Type   Note type Evaluation   Pain Assessment   Pain Assessment Tool 0-10   Pain Score 6   Pain Location/Orientation Location: Back   Patient's Stated Pain Goal No pain   Hospital Pain Intervention(s) Repositioned; Ambulation/increased activity   Restrictions/Precautions   Weight Bearing Precautions Per Order No   Other Precautions Cognitive; Chair Alarm; Bed Alarm;Multiple lines; Fall Risk   Home Living   Type of [address] [place] [address] Two level;Stairs to enter with rails  (0 lashawn)   Bathroom Shower/Tub Walk-in shower   Bathroom Toilet Standard   Bathroom Equipment Grab bars in shower;Grab bars around toilet; Shower chair   Bathroom Accessibility Accessible   Home Equipment Walker;Cane  (uses rw at baseline)   Prior Function   Level of [place] Independent with functional mobility; Independent with ADLs; Independent with IADLS   Lives With [facility] Help From Family   IADLs Independent with meal prep; Independent with driving; Independent with medication management   Falls in the last 6 months 0  (1)   Vocational Retired   Lifestyle   Autonomy pta, pt reports she was I W ADL/IADL, rw mobility  +   Reciprocal Relationships supportive brother and son- was at [facility] prior to admission here   Service to Others retired   Intrinsic Gratification keeping active   Subjective   Subjective "I feel much better than yesterday"   ADL   Where Assessed Edge of bed   Eating Assistance 6  Modified independent   Grooming Assistance 6  Modified Independent   UB Bathing Assistance 4  Minimal Assistance   LB [address] 3  Moderate Assistance   UB Dressing Assistance 4  Minimal Assistance    [facility] [address] 3  Moderate [address]  3  Moderate Assistance   Functional Assistance 3  Moderate Assistance   Bed Mobility   Supine to Sit 3  Moderate assistance   Additional items Assist x 1; Increased time required;Verbal cues;LE management   Additional Comments found in bed, left in chair w all needs in reach and alarm on  Transfers   Sit to Stand 3  Moderate assistance   Additional items Assist x 1; Increased time required;Verbal cues   Stand to Sit 3  Moderate assistance   Additional items Assist x 1; Increased time required;Verbal cues   Additional Comments c rw  vc for hand placement  Functional Mobility   Functional Mobility 3  Moderate assistance   Additional Comments ax1, close second standby as well as close chair follow  very ataxic gait     Additional items Rolling walker   Balance   Static Sitting Fair   Dynamic Sitting Fair -   Static Standing Poor +   Dynamic Standing Poor   Ambulatory Poor Activity Tolerance   Activity Tolerance Patient limited by fatigue   Medical Staff Made Aware DPT Joni Quiros 2' pts med complexity, comorbidities and regression from baseline   Nurse Made Aware ok per RN   RUE Assessment   RUE Assessment WFL   LUE Assessment   LUE Assessment X   Hand Function   Gross Motor Coordination Functional   Fine Motor Coordination Functional   Psychosocial   Psychosocial (WDL) WDL   Cognition   Overall Cognitive Status Impaired   Arousal/Participation Alert; Responsive; Cooperative   Attention Attends with cues to redirect   Orientation Level Oriented X4   Memory Decreased recall of precautions   Following Commands Follows one step commands with increased time or repetition   Comments pleasant and cooperative, does appear to have slightly dec processing speeds/overall safety awareness and insight to condition  Assessment   Limitation Decreased ADL status; Decreased endurance;Decreased self-care trans;Decreased high-level ADLs; Decreased cognition;Decreased Safe judgement during ADL   Prognosis Good   Assessment Pt is a 75 y o  female admitted 6/65/89 w acute metabolic encephalopathy  MRI revealing new small acute interval lacunar infarction in R cerebral peduncle  Pt w active OT eval and treat orders at this time  PMH includes  has a past medical history of Anxiety, Asthma, Bipolar depression ([address] ), Chronic narcotic dependence ([address] ), Chronic pain, Depression, Enterovirus heart infection, SHAR (obstructive sleep apnea), Osteoarthritis, Peripheral neuropathy, Plantar fasciitis of right foot, Senile osteoporosis, Seronegative arthropathy of multiple sites ([address] ), and Undifferentiated connective tissue disease ([address] )  Pt lives w brother and son in a 2 SH with 0 TAB, reporst walk in showre with grab bars, shower chair, standard toilet wit grab bars as well  Pt was admitted from [facility], however prior to that rehab stay, she was independent w/ ADL/IADL and used RW functional mobility, was  Driving  Wishes to get back to that level of function  Currently, pt is Min Ax1 for UB ADL, Mod Ax1 for LB ADL, and completed transfers/FM w Mod Ax1  Pt is limited at this time 2* decreased endurance/activtiy tolerance, decreased cognition, decreased ADL/High-level ADL status, decreased self-care trans, decreased safety awareness, limited home support and is a fall risk  This impacts pt's ability to complete UB and LB dressing and bathing, toileting, transfers, functional mobility, community mobility, home and health maintenance, and safe engagement in typical daily routine  The patient's raw score on the AM-PAC Daily Activity inpatient short form is 16, standardized score is 35 96, less than 39 4  Patients at this level are likely to benefit from discharge to post-acute rehabilitation services  Please refer to the recommendation of the Occupational Therapist for safe discharge planning  From OT standpoint, pt should D/C to STR when medically stable  Pt will benefit from continued acute OT services 2-3 x/wk for 10-14 days to meet goals  Goals   Patient Goals cont to get better   LTG Time Frame 10-14   Long Term Goal #1 see below   Plan   Treatment Interventions ADL retraining;Functional transfer training; Endurance training;Cognitive reorientation;Patient/family training;Equipment evaluation/education; Compensatory technique education; Energy conservation; Activityengagement   Goal Expiration Date 03/10/23   OT Frequency 2-3x/wk   Recommendation   OT Discharge Recommendation Post acute rehabilitation services   [facility] Daily Activity Inpatient   Lower Body Dressing 2   Bathing 2   Toileting 2   Upper Body Dressing 3   Grooming 3   Eating 4   Daily Activity Raw Score 16   Daily Activity Standardized Score (Calc for Raw Score >=11) 35 96   AM-[facility] Applied Cognition Inpatient   Following a Speech/Presentation 3   Understanding Ordinary Conversation 3   Taking Medications 3   Remembering Where Things Are Placed or Put Away 2 Remembering List of 4-5 Errands 2   Taking Care of Complicated Tasks 2   Applied Cognition Raw Score 15   Applied Cognition Standardized Score 33 54   Modified [place] Scale   Modified [place] Scale 4   End of Consult   Education Provided Yes   Patient Position at End of Consult Bedside chair;Bed/Chair alarm activated; All needs within reach   Nurse Communication Nurse aware of consult     Pt will complete functional mobility with Mod I using appropriate DME as needed  Pt will complete UB dressing and bathing with Mod I using appropriate DME as needed  Pt will complete LB dressing and bathing with Mod I using appropriate DME as needed  Pt will complete transfers with Mod I using appropriate DME as needed  Pt will complete toileting with Mod I using appropriate DME as needed  Pt will utilize energy conservation techniques throughout functional activity/ADL s/p skilled education  Pt will demonstrate increased safety awareness during functional tasks/ADL's s/p skilled education  Pt will increase activity tolerance to 30 minutes in order to complete ADL's/ functional tasks, using appropriate DME as needed  Pt will engage in ongoing cog assessment w/ G participation to assist with safe d/c planning     MAY Matos, OTR/L

## 2023-02-24 NOTE — ASSESSMENT & PLAN NOTE
Wt Readings from Last 3 Encounters:   02/16/23 62 6 kg (138 lb)   02/12/23 67 9 kg (149 lb 11 1 oz)   09/14/22 67 kg (147 lb 12 8 oz)   · Stress/stroke induced cardiomyopathy  History of cardiomyopathy in the past EF as low as 10 to 15% in 2004, more recently improved EF to 35 to 40%  · EF 25-30% on echo during prior admission   · Declined lifevest on prior admission due to stress  · Maintained on metoprolol succinate (changed from coreg prior admission), Not chronically on ace or arb  · Appears clinically compensated     · Daily weights / I and O's

## 2023-02-24 NOTE — PHYSICAL THERAPY NOTE
PHYSICAL THERAPY EVALUATION  NAME:  Corby Quinn  DATE: 02/24/23    AGE:   75 y o  Mrn:   [id number]  ADMIT DX:  Confusion [R41 0]  Altered mental status [R41 82]  Weakness [R53 1]  Stroke ([facility] ) [I63 9]  History of stroke [F66 13]  Acute metabolic encephalopathy [I64 24]    Past Medical History:   Diagnosis Date    Anxiety     Asthma     Bipolar depression (HCC)     Chronic narcotic dependence (HCC)     Chronic pain     Depression     Enterovirus heart infection     SHAR (obstructive sleep apnea)     Osteoarthritis     Peripheral neuropathy     Plantar fasciitis of right foot     Senile osteoporosis     Seronegative arthropathy of multiple sites ([facility] )     Undifferentiated connective tissue disease ([facility] )        Past Surgical History:   Procedure Laterality Date    BREAST IMPLANT      FIXATION KYPHOPLASTY LUMBAR SPINE      HYSTERECTOMY      INCONTINENCE SURGERY N/A     IR STROKE ALERT  2/12/2023    NASAL SEPTOPLASTY W/ TURBINOPLASTY N/A     RECTAL PROLAPSE REPAIR N/A     REPAIR RECTOCELE      RHINOPLASTY N/A        Length Of Stay: 2    PHYSICAL THERAPY EVALUATION:        02/24/23 6283   Note Type   Note type Evaluation   Pain Assessment   Pain Assessment Tool 0-10   Pain Score 6   Pain Location/Orientation Location: Back   Pain Onset/Description Onset: Ongoing;Frequency: Constant/Continuous; Descriptor: Aching   Effect of Pain on Daily Activities increased pain with activity   Patient's Stated Pain Goal No pain   Restrictions/Precautions   Weight Bearing Precautions Per Order No   Other Precautions Chair Alarm; Bed Alarm;Multiple lines; Fall Risk;Pain   Home Living   Type of [address] [place] [address] Two level  (0 TAB)   Home Equipment Walker;Cane   Additional Comments Pt reports at baseline she lives with her brother and son however pt was at [facility] for rehab prior to this admission   Prior Function   Level of [place] Independent with functional mobility   Lives With [facility] in the last 6 months 0   Comments Pt reports the use of a RW for ambulation PTA   General   Additional Pertinent History pt with hx of CVA with residual L sided deficits   Family/Caregiver Present No   Cognition   Overall Cognitive Status WFL   Arousal/Participation Alert   Orientation Level Oriented X4   Memory Within functional limits   Following Commands Follows all commands and directions without difficulty   RUE Assessment   RUE Assessment WFL   LUE Assessment   LUE Assessment X   RLE Assessment   RLE Assessment WFL   Strength RLE   RLE Overall Strength 4-/5   LLE Assessment   LLE Assessment X   Strength LLE   LLE Overall Strength 3+/5   Bed Mobility   Supine to Sit 3  Moderate assistance   Additional items Assist x 1; Increased time required;Verbal cues   Transfers   Sit to Stand 3  Moderate assistance   Additional items Assist x 1; Increased time required;Verbal cues   Stand to Sit 3  Moderate assistance   Additional items Assist x 1; Increased time required;Verbal cues   Additional Comments cues needed for hand placement during transfers   Ambulation/Elevation   Gait pattern Short stride; Foward flexed; Inconsistent anna;Decreased foot clearance   Gait Assistance 3  Moderate assist   Additional items Assist x 1   Assistive Device Rolling walker   Distance 10ft x 2   Balance   Static Sitting Fair -   Static Standing Poor +   Ambulatory Poor   Endurance Deficit   Endurance Deficit Yes   Endurance Deficit Description fatigue, pain   Activity Tolerance   Activity Tolerance Patient limited by fatigue;Patient limited by pain   Medical Staff Made Aware [address]den OT   Nurse Made Aware Pt appropriate to be seen and mobilize per nsg   Assessment   Prognosis Good   Problem List Decreased strength;Decreased endurance; Impaired balance;Decreased mobility; Decreased range of motion;Pain   Assessment Pt is 75 y o  female seen for PT evaluation s/p admit to [facility] on 2/22/2023   Two pt identifiers were used to confirm  Pt presented w/ altered mental status  Pt was admitted with a primary dx of: Acute metabolic encephalopathy, and other active problems including acute CVA, abdominal pain, discoloration of skin of foot, hypercalcemia, heart failure with reduced ejection fraction, HTN, rheumatoid arthritis, bipolar depression, chronic pain disorder  PT now consulted for assessment of mobility and d/c needs  Pt with Up with assistance orders  Pts current co morbidities affecting treatment include:  has a past medical history of Anxiety, Asthma, Bipolar depression ([facility] ), Chronic narcotic dependence ([facility] ), Chronic pain, Depression, Enterovirus heart infection, SHAR (obstructive sleep apnea), Osteoarthritis, Peripheral neuropathy, Plantar fasciitis of right foot, Senile osteoporosis, Seronegative arthropathy of multiple sites ([facility] ), and Undifferentiated connective tissue disease ([facility] )    Pts current clinical presentation is Unstable/ Unpredictable (high complexity) due to Ongoing medical management for primary dx, Increased reliance on more restrictive AD compared to baseline, Decreased activity tolerance compared to baseline, Fall risk, Increased assistance needed from caregiver at current time, Continuous pulse oximetry monitoring     Upon evaluation, pt currently is requiring Mod Ax1 for bed mobility; Mod Ax1 for transfers and Mod Ax1 for ambulation w/ RW  Pt presents at PT eval functioning below baseline and currently w/ overall mobility deficits 2* to: BLE weakness, impaired balance, decreased endurance, gait deviations, pain, decreased activity tolerance compared to baseline, fall risk  Pt currently at a fall risk 2* to impairments listed above  Based on the aforementioned PT evaluation, pt will continue to benefit from skilled Acute PT interventions to address stated impairments; to maximize functional mobility; for ongoing pt/ family training; and DME needs   At conclusion of PT session pt returned back in chair and chair alarm engaged with phone and call bell within reach  Pt denies any further questions at this time  PT is currently recommending Rehab  PT will continue to follow during hospital stay  Goals   Patient Goals "to go back to rehab"   STG Expiration Date 03/06/23   Short Term Goal #1 In 10 days pt will complete: 1) Bed mobility skills with S to increase safety and independence as well as decrease caregiver burden  2) Functional transfers with S to promote increased independence, safety, and QOL  3) Ambulate 150' using least restrictive AD with S without LOB and stable vitals so that pt can negotiate previous living environment safely and promote independence with functional mobility and return to PLOF  4) Improve balance grades by 1/2 grade to increase safety with all mobility and decrease fall risk  5) Improve BLE strength by 1/2 grade to help increase overall functional mobility and decrease fall risk  Plan   Treatment/Interventions Functional transfer training;LE strengthening/ROM; Therapeutic exercise; Endurance training;Patient/family training;Equipment eval/education; Bed mobility;Gait training;Spoke to nursing;OT   PT Frequency 3-5x/wk   Recommendation   PT Discharge Recommendation Post acute rehabilitation services   Equipment Recommended [facility] Recommended Wheeled walker   AM-PAC Basic Mobility Inpatient   Turning in Flat Bed Without Bedrails 2   Lying on Back to Sitting on Edge of Flat Bed Without Bedrails 2   Moving Bed to Chair 2   Standing Up From Chair Using Arms 2   Walk in Room 2   Climb 3-5 Stairs With Railing 1   Basic Mobility Inpatient Raw Score 11   Basic Mobility Standardized Score 30 25   Highest Level Of Mobility   JH-HLM Goal 4: Move to chair/commode   JH-HLM Achieved 6: Walk 10 steps or more   Modified [place] Scale   Modified [place] Scale 4   Barthel Index   Feeding 10   Bathing 0   Grooming Score 5   Dressing Score 5   Bladder Score 10   Bowels Score 10   Toilet Use Score 5   Transfers (Bed/Chair) Score 5   Mobility (Level Surface) Score 0   Stairs Score 0   Barthel Index Score 50   Portions of the documentation may have been created using voice recognition software  Occasional wrong word or sound alike substitutions may have occurred due to the inherent limitations of the voice recognition software  Read the chart carefully and recognize, using context, where substitutions have occurred      Dagoberto Monroy, PT, DPT

## 2023-02-24 NOTE — ARC ADMISSION
Referral received for consideration of patient for ARC placement  Reviewed with ARC physician this morning and no determination has been made but ARC will continue to follow  CM has been updated

## 2023-02-24 NOTE — CONSULTS
Consultation - Nirmala Hastings 75 y o  female MRN: [id number]    Unit/Bed#: [facility] 628-01 Encounter: [id number]      Assessment/Plan     Assessment: This is a 75y o -year-old female with past medical history of thyroid nodule, osteoporosis, vertebral compression fractures, rheumatoid arthritis, s/p thromboembolectomy, hypertension, heart failure with reduced ejection fraction, who presented to the hospital with altered mental status, is admitted for acute CVA  Endocrinology consulted for the management of hypercalcemia    Plan:  Mild Hypercalcemia  Corrected calcium 11 8, ionized calcium elevated 1 33, PTH inappropriately normal at 65 4, GFR 78, 25-hydroxy vitamin D 41 7, phos 2 3  TSH 3 19 in September 2022  Follow-up 1, 25-hydroxy vitamin D, SPEP, UPEP, PTHrP  Given that the PTH is not appropriately suppressed (<20) in the setting of hypercalcemia, there is a suspicion for primary hyperparathyroidism, would recommend outpatient follow up for 24hr urine collections for calcium and creatinine   Would recommend monitoring corrected calcium and to consider treatment if it is >12 or if pt is symptomatic    Osteoporosis  Given hx of T11, L1 and L4 vertebral compression fractures, would recommend outpatient follow up with endocrinology      CC: Hypercalcemia consult    History of Present Illness     HPI:   This is a 75y o -year-old female with past medical history of thyroid nodule, osteoporosis, vertebral compression fractures, rheumatoid arthritis, CVA s/p thromboembolectomy, hypertension, heart failure with reduced ejection fraction, who presented to the hospital with altered mental status, is admitted for acute CVA  Endocrinology consulted for the management of hypercalcemia  Patient reports that she has had hypercalcemia for many years, EMR record show elevated calcium since 2018  Reports to have a history of osteoporosis, last DEXA scan about 5 years ago, however unable to find it in EMR    Patient denies any history of kidney stones, over-the-counter calcium supplementation or vitamin D supplementation  Complains of abdominal pain  Denies any constipation  Patient complains of polyuria and polydipsia for 1 week, attributes it to her UTI  Complains of cold intolerance  CT chest noted to have RLL calcified granuloma  Inpatient consult to Endocrinology  Consult performed by: Babatunde Malhotra MD  Consult ordered by: León Dsouza DO          Review of Systems   Constitutional: Positive for fatigue  Negative for activity change and appetite change  HENT: Negative for congestion and sore throat  Eyes: Negative for redness and visual disturbance  Respiratory: Negative for cough and shortness of breath  Cardiovascular: Negative for palpitations and leg swelling  Gastrointestinal: Positive for abdominal pain  Negative for constipation, diarrhea, nausea and vomiting  Endocrine: Positive for cold intolerance, polydipsia and polyuria  Negative for heat intolerance and polyphagia  Genitourinary: Negative for difficulty urinating and dysuria  Musculoskeletal: Negative for gait problem and neck pain  Skin: Negative for color change  Neurological: Negative for dizziness and syncope  Psychiatric/Behavioral: Negative for agitation and behavioral problems  All other systems reviewed and are negative        Historical Information   Past Medical History:   Diagnosis Date   • Anxiety    • Asthma    • Bipolar depression ([facility] )    • Chronic narcotic dependence ([place] [id number] )    • Chronic pain    • Depression    • Enterovirus heart infection    • SHAR (obstructive sleep apnea)    • Osteoarthritis    • Peripheral neuropathy    • Plantar fasciitis of right foot    • Senile osteoporosis    • Seronegative arthropathy of multiple sites [facility])    • Undifferentiated connective tissue disease ([facility] )      Past Surgical History:   Procedure Laterality Date   • BREAST IMPLANT     • FIXATION KYPHOPLASTY LUMBAR SPINE • HYSTERECTOMY     • INCONTINENCE SURGERY N/A    • IR STROKE ALERT  2/12/2023   • NASAL SEPTOPLASTY W/ TURBINOPLASTY N/A    • RECTAL PROLAPSE REPAIR N/A    • REPAIR RECTOCELE     • RHINOPLASTY N/A      Social History   Social History     Substance and Sexual Activity   Alcohol Use Not Currently    Comment: quit     Social History     Substance and Sexual Activity   Drug Use Yes   • Types: Marijuana    Comment: has medical card     Social History     Tobacco Use   Smoking Status Former   Smokeless Tobacco Never     Family History:   Family History   Problem Relation Age of Onset   • No Known Problems Mother    • No Known Problems Father    • Arthritis Family    • Diabetes Family    • Stroke Family        Meds/Allergies   Current Facility-Administered Medications   Medication Dose Route Frequency Provider Last Rate Last Admin   • acetaminophen (TYLENOL) tablet 650 mg  650 mg Oral Q6H PRN Castillo Vargas PA-C       • albuterol (PROVENTIL HFA,VENTOLIN HFA) inhaler 2 puff  2 puff Inhalation Q6H PRN Sarah Staples PA-C       • aluminum-magnesium hydroxide-simethicone (MYLANTA) oral suspension 30 mL  30 mL Oral Q4H PRN Castillo Vargas PA-C       • aspirin (ECOTRIN LOW STRENGTH) EC tablet 81 mg  81 mg Oral Daily Kavya Colin MD   81 mg at 02/24/23 0830   • atorvastatin (LIPITOR) tablet 40 mg  40 mg Oral QPM Sarah Staples PA-C   40 mg at 02/23/23 1746   • buPROPion (WELLBUTRIN) tablet 100 mg  100 mg Oral BID Sarah Staples PA-C   100 mg at 02/24/23 0831   • DULoxetine (CYMBALTA) delayed release capsule 60 mg  60 mg Oral BID Sarah Staples PA-C   60 mg at 02/24/23 0830   • enoxaparin (LOVENOX) subcutaneous injection 40 mg  40 mg Subcutaneous Daily Sarah Staples PA-C   40 mg at 02/24/23 0830   • gabapentin (NEURONTIN) capsule 300 mg  300 mg Oral BID Sarah Staples PA-C   300 mg at 02/24/23 0830   • hydroxychloroquine (PLAQUENIL) tablet 200 mg  200 mg Oral Once per day on Mon Tue Wed Thu Fri LAURA Meyer   200 mg at 02/24/23 0831   • [START ON 2/25/2023] hydroxychloroquine (PLAQUENIL) tablet 400 mg  400 mg Oral Once per day on Sun Sat LAURA Otero       • lamoTRIgine (LaMICtal) tablet 150 mg  150 mg Oral HS Sarah Staples PA-C   150 mg at 02/23/23 2127   • metoprolol succinate (TOPROL-XL) 24 hr tablet 25 mg  25 mg Oral BID Sarah Staples PA-C   25 mg at 02/24/23 0830   • morphine (MS CONTIN) ER tablet 15 mg  15 mg Oral BID Sarah Staples PA-C   15 mg at 02/24/23 0830   • polyethylene glycol (MIRALAX) packet 17 g  17 g Oral Daily PRN Sarah Staples PA-C       • senna-docusate sodium (SENOKOT S) 8 6-50 mg per tablet 1 tablet  1 tablet Oral BID Sarah Staples PA-C   1 tablet at 02/22/23 2203     Allergies   Allergen Reactions   • Dye [Iodinated Contrast Media] Anaphylaxis     IVP dye   • Bactrim [Sulfamethoxazole-Trimethoprim] Hives   • Erythromycin Hives   • Penicillins        Objective   Vitals: Blood pressure 140/93, pulse (!) 115, temperature 98 6 °F (37 °C), resp  rate 20, SpO2 98 %  Intake/Output Summary (Last 24 hours) at 2/24/2023 1037  Last data filed at 2/24/2023 0500  Gross per 24 hour   Intake 975 5 ml   Output 300 ml   Net 675 5 ml     Invasive Devices     Peripheral Intravenous Line  Duration           Peripheral IV 02/22/23 Left Antecubital 1 day                Physical Exam  Constitutional:       Appearance: Normal appearance  HENT:      Head: Normocephalic and atraumatic  Cardiovascular:      Rate and Rhythm: Normal rate and regular rhythm  Pulses: Normal pulses  Heart sounds: Normal heart sounds  No murmur heard  No gallop  Pulmonary:      Effort: Pulmonary effort is normal       Breath sounds: Normal breath sounds  No wheezing or rales  Abdominal:      Palpations: Abdomen is soft  Tenderness: There is no abdominal tenderness  Musculoskeletal:         General: Deformity present  No swelling  Cervical back: Normal range of motion and neck supple  No tenderness  Right lower leg: No edema  Left lower leg: No edema  Comments: Kyphosis of thoracic spine noted   Lymphadenopathy:      Cervical: No cervical adenopathy  Skin:     General: Skin is warm  Findings: Bruising present  Neurological:      Mental Status: She is alert and oriented to person, place, and time  Cranial Nerves: Facial asymmetry present  Motor: Weakness present  Comments: L facial droop noted on smiling   Psychiatric:         Mood and Affect: Mood normal          Behavior: Behavior normal          The history was obtained from the review of the chart, patient  Lab Results:       Lab Results   Component Value Date    WBC 7 20 02/24/2023    HGB 13 8 02/24/2023    HCT 40 9 02/24/2023    MCV 94 02/24/2023     02/24/2023     Lab Results   Component Value Date/Time    BUN 13 02/24/2023 04:22 AM    K 3 2 (L) 02/24/2023 04:22 AM     (H) 02/24/2023 04:22 AM    CO2 25 02/24/2023 04:22 AM    CO2 29 11/17/2016 08:15 PM    CREATININE 0 78 02/24/2023 04:22 AM    AST 22 02/23/2023 04:52 AM    ALT 19 02/23/2023 04:52 AM    ALB 3 1 (L) 02/23/2023 04:52 AM     No results for input(s): CHOL, HDL, LDL, TRIG, VLDL in the last 72 hours  No results found for: Chris Cook  POC Glucose (mg/dl)   Date Value   02/12/2023 113   04/23/2022 77   03/02/2022 151 (H)   03/02/2022 38 (LL)       Imaging Studies: I have personally reviewed pertinent reports  Portions of the record may have been created with voice recognition software  Please Tigertext questions to the clinician covering the "[id number]" Role  Thank you

## 2023-02-24 NOTE — PLAN OF CARE
Problem: Nutrition/Hydration-ADULT  Goal: Nutrient/Hydration intake appropriate for improving, restoring or maintaining nutritional needs  Description: Monitor and assess patient's nutrition/hydration status for malnutrition  Collaborate with interdisciplinary team and initiate plan and interventions as ordered  Monitor patient's weight and dietary intake as ordered or per policy  Utilize nutrition screening tool and intervene as necessary  Determine patient's food preferences and provide high-protein, high-caloric foods as appropriate       INTERVENTIONS:  - Monitor oral intake, urinary output, labs, and treatment plans  - Assess nutrition and hydration status and recommend course of action  - Evaluate amount of meals eaten  - Assist patient with eating if necessary   - Allow adequate time for meals  - Recommend/ encourage appropriate diets, oral nutritional supplements, and vitamin/mineral supplements  - Order, calculate, and assess calorie counts as needed  - Recommend, monitor, and adjust tube feedings and TPN/PPN based on assessed needs  - Assess need for intravenous fluids  - Provide specific nutrition/hydration education as appropriate  - Include patient/family/caregiver in decisions related to nutrition  Outcome: Progressing     Problem: PAIN - ADULT  Goal: Verbalizes/displays adequate comfort level or baseline comfort level  Description: Interventions:  - Encourage patient to monitor pain and request assistance  - Assess pain using appropriate pain scale  - Administer analgesics based on type and severity of pain and evaluate response  - Implement non-pharmacological measures as appropriate and evaluate response  - Consider cultural and social influences on pain and pain management  - Notify physician/advanced practitioner if interventions unsuccessful or patient reports new pain  Outcome: Progressing     Problem: INFECTION - ADULT  Goal: Absence or prevention of progression during hospitalization  Description: INTERVENTIONS:  - Assess and monitor for signs and symptoms of infection  - Monitor lab/diagnostic results  - Monitor all insertion sites, i e  indwelling lines, tubes, and drains  - Monitor endotracheal if appropriate and nasal secretions for changes in amount and color  - [place] appropriate cooling/warming therapies per order  - Administer medications as ordered  - Instruct and encourage patient and family to use good hand hygiene technique  - Identify and instruct in appropriate isolation precautions for identified infection/condition  Outcome: Progressing     Problem: SAFETY ADULT  Goal: Patient will remain free of falls  Description: INTERVENTIONS:  - Educate patient/family on patient safety including physical limitations  - Instruct patient to call for assistance with activity   - Consult OT/PT to assist with strengthening/mobility   - Keep Call bell within reach  - Keep bed low and locked with side rails adjusted as appropriate  - Keep care items and personal belongings within reach  - Initiate and maintain comfort rounds  - Make Fall Risk Sign visible to staff  - Offer Toileting every  Hours, in advance of need  - Initiate/Maintain alarm  - Obtain necessary fall risk management equipment:   - Apply yellow socks and bracelet for high fall risk patients  - Consider moving patient to room near nurses station  Outcome: Progressing  Goal: Maintain or return to baseline ADL function  Description: INTERVENTIONS:  -  Assess patient's ability to carry out ADLs; assess patient's baseline for ADL function and identify physical deficits which impact ability to perform ADLs (bathing, care of mouth/teeth, toileting, grooming, dressing, etc )  - Assess/evaluate cause of self-care deficits   - Assess range of motion  - Assess patient's mobility; develop plan if impaired  - Assess patient's need for assistive devices and provide as appropriate  - Encourage maximum independence but intervene and supervise when necessary  - Involve family in performance of ADLs  - Assess for home care needs following discharge   - Consider OT consult to assist with ADL evaluation and planning for discharge  - Provide patient education as appropriate  Outcome: Progressing  Goal: Maintains/Returns to pre admission functional level  Description: INTERVENTIONS:  - Perform BMAT or MOVE assessment daily    - Set and communicate daily mobility goal to care team and patient/family/caregiver  - Collaborate with rehabilitation services on mobility goals if consulted  - Perform Range of Motion  times a day  - Reposition patient every  hours  - Dangle patient  times a day  - Stand patient  times a day  - Ambulate patient  times a day  - Out of bed to chair  times a day   - Out of bed for meals  times a day  - Out of bed for toileting  - Record patient progress and toleration of activity level   Outcome: Progressing     Problem: DISCHARGE PLANNING  Goal: Discharge to home or other facility with appropriate resources  Description: INTERVENTIONS:  - Identify barriers to discharge w/patient and caregiver  - Arrange for needed discharge resources and transportation as appropriate  - Identify discharge learning needs (meds, wound care, etc )  - Arrange for interpretive services to assist at discharge as needed  - Refer to Case Management Department for coordinating discharge planning if the patient needs post-hospital services based on physician/advanced practitioner order or complex needs related to functional status, cognitive ability, or social support system  Outcome: Progressing     Problem: Knowledge Deficit  Goal: Patient/family/caregiver demonstrates understanding of disease process, treatment plan, medications, and discharge instructions  Description: Complete learning assessment and assess knowledge base    Interventions:  - Provide teaching at level of understanding  - Provide teaching via preferred learning methods  Outcome: Progressing     Problem: MOBILITY - ADULT  Goal: Maintain or return to baseline ADL function  Description: INTERVENTIONS:  -  Assess patient's ability to carry out ADLs; assess patient's baseline for ADL function and identify physical deficits which impact ability to perform ADLs (bathing, care of mouth/teeth, toileting, grooming, dressing, etc )  - Assess/evaluate cause of self-care deficits   - Assess range of motion  - Assess patient's mobility; develop plan if impaired  - Assess patient's need for assistive devices and provide as appropriate  - Encourage maximum independence but intervene and supervise when necessary  - Involve family in performance of ADLs  - Assess for home care needs following discharge   - Consider OT consult to assist with ADL evaluation and planning for discharge  - Provide patient education as appropriate  Outcome: Progressing  Goal: Maintains/Returns to pre admission functional level  Description: INTERVENTIONS:  - Perform BMAT or MOVE assessment daily    - Set and communicate daily mobility goal to care team and patient/family/caregiver  - Collaborate with rehabilitation services on mobility goals if consulted  - Perform Range of Motion  times a day  - Reposition patient every  hours    - Dangle patient  times a day  - Stand patient  times a day  - Ambulate patient  times a day  - Out of bed to chair  times a day   - Out of bed for meal times a day  - Out of bed for toileting  - Record patient progress and toleration of activity level   Outcome: Progressing

## 2023-02-24 NOTE — PROGRESS NOTES
NEPHROLOGY PROGRESS NOTE   Monica Guajardo 75 y o  female MRN: [id number]  Unit/Bed#: [facility] 628-01 Encounter: [id number]      ASSESSMENT/PLAN:  1  Hypercalcemia: this has been chronic/intermittent for the past few years  Calcium 10 7 today (uncorrected)  Suspect primary hyperparathyroid as PTH not suppressed   · PTH 65 4, Phos 2 4, vit D 41 7 iCa 1 33   · Pending: SPEP/UPEP, PTHrP, vit D 1,25  · CT C/A/P and head: No signs of malignancy  · Avoid any calcium or vitamin D products  · S/p trial NS at 50cc/h x 10 hours --avoid further fluids given her history of CHF  · Awaiting endocrine consult for primary hyperparathyroid  · Check am BMP   2  Encephalopathy: neuro consulted  Had recent acute CVA on 2/12 and now found to have new small acute infarct on admission  3  HFrEF with EF 25-30%   · Not on an outpatient diuretic   · S/p gentle IVF and no signs of volume overload   4  HTN: BP acceptable   · Continue metoprolol 25mg bid   5  RA: on plaquenil   6  Bipolar: no hx of lithium use per patient     Plan Summary:   • Awaiting endocrine consult  • Check am BMP  • Discussed with primary team who agree with above plan    SUBJECTIVE:  Feeling better today  No current complaints       OBJECTIVE:  Current Weight:    Vitals:    02/24/23 1126   BP: 132/88   Pulse: (!) 121   Resp: 18   Temp:    SpO2: 96%       Intake/Output Summary (Last 24 hours) at 2/24/2023 1237  Last data filed at 2/24/2023 0500  Gross per 24 hour   Intake 668 ml   Output 300 ml   Net 368 ml       General:  appears comfortable and in no acute distress   Skin:  No rash  Eyes:  Sclerae anicteric, no periorbital edema   ENT:  Moist mucous membranes  Neck:  Trachea midline, symmetric   Chest:  Clear to auscultation bilaterally with no wheezes, rales or rhonchi  CVS:  Regular rate and rhythm  Abdomen:  Soft, nontender, nondistended  Neuro:  Awake and alert  Psych:  Appropriate affect  Extremities: no lower extremity edema       Medications:  Scheduled Meds:  Current Facility-Administered Medications   Medication Dose Route Frequency Provider Last Rate   • acetaminophen  650 mg Oral Q6H PRN Bang Espinoza PA-C     • albuterol  2 puff Inhalation Q6H PRN Sarah Staples PA-C     • aluminum-magnesium hydroxide-simethicone  30 mL Oral Q4H PRN Bang Espinoza PA-C     • aspirin  81 mg Oral Daily Andreea Canales MD     • atorvastatin  40 mg Oral QPM Sarah Staples PA-C     • buPROPion  100 mg Oral BID Sarah Staples PA-C     • DULoxetine  60 mg Oral BID Sarah Staples PA-C     • enoxaparin  40 mg Subcutaneous Daily Sarah Staples PA-C     • gabapentin  300 mg Oral BID Sarah Staples PA-C     • hydroxychloroquine  200 mg Oral Once per day on Mon Tue Wed Thu Fri LAURA Flores     • [START ON 2/25/2023] hydroxychloroquine  400 mg Oral Once per day on Sun Sat LAURA Flores     • lamoTRIgine  150 mg Oral HS Sarah Staples PA-C     • metoprolol succinate  25 mg Oral BID Sarah Staples PA-C     • morphine  15 mg Oral BID Sarah Staples PA-C     • polyethylene glycol  17 g Oral Daily PRN Sarah Staples PA-C     • potassium chloride  40 mEq Oral Once [employer], DO     • senna-docusate sodium  1 tablet Oral BID Sarah Staples PA-C         PRN Meds: •  acetaminophen  •  albuterol  •  aluminum-magnesium hydroxide-simethicone  •  polyethylene glycol    Laboratory Results:  Results from last 7 days   Lab Units 02/24/23  0422 02/23/23  0452 02/22/23 2004 02/22/23  1405 02/22/23  1400   WBC Thousand/uL 7 20 7 20  --  7 80  --    HEMOGLOBIN g/dL 13 8 14 3  --  13 6  --    HEMATOCRIT % 40 9 43 8  --  41 4  --    PLATELETS Thousands/uL 240 261 248 270  --    SODIUM mmol/L 143 139  --   --  142   POTASSIUM mmol/L 3 2* 4 1  --   --  4 4   CHLORIDE mmol/L 113* 112*  --   --  111*   CO2 mmol/L 25 23  --   --  29   BUN mg/dL 13 13  --   --  15   CREATININE mg/dL 0 78 0 84  --   --  0 93   CALCIUM mg/dL 10 7* 11 1*  --   --  10 9*   PHOSPHORUS mg/dL 2 4  --   --   -- --

## 2023-02-24 NOTE — PROGRESS NOTES
[address] [facility]  Progress Note - Ines Will 1955, 75 y o  female MRN: [id number]  Unit/Bed#: [facility] 628-01 Encounter: [id number]  Primary Care Provider: Edy Green DO   Date and time admitted to hospital: 2/22/2023  1:16 PM    * Acute metabolic encephalopathy  Assessment & Plan  · Presents with AMS from outpatient facility recently admitted to [facility] for CVA where she underwent revascularization of a right m1 occlusion (Dr Mey Polanco 2/12/2023),  · Etiology likely secondary to acute cva  S/p MRI revealing: new small acute /more recent interval lacunar infarction in the right cerebral peduncle  Versus questionable seizure event  · Also possibly component of metabolic etiology given hypercalcemia and positive blood cx  · Neurology on board  · Status post EEG  No initiation of antiepileptic medications per neurology  · Cont neuro checks  · MS appears much improved  · S/p speech eval cleared for pureed with thin liquid    Acute CVA (cerebrovascular accident) [facility])  Assessment & Plan  S/p MRI  Neurology on board  Recent hx of MCA CVA with petechial hemorrhage w residual L sided weakness and dysarthria s/p mechanical thrombectomy  Pt w/ severe contrast allergy resulting anaphylaxis, no need to repeat per neuro  Recent echo on 2/16/23  CVA prophylaxis: In light of new MRI findings started on asa  Plan is to start NOAC over the weekend if stable repeat cth  Cont statin      Bacteremia  Assessment & Plan  · 2 out of 2 sets GPC  · ID on board, appreciate input  · Per dtr pt was treated for recent UTI at [address] [place] [address] with keflex x 5 days  · On exam no evidence of cellulitis  Presenting b/l feet diffuse erythema resolved and no evidence of skin breakage  · Empirically txt with vanco for now  F/u identification and sensitivites  · Follow-up repeat blood cultures    Abdominal pain  Assessment & Plan  · Pt endorses abdominal pain, nausea now resolved  · S/p CT imaging ?  Colonic thickening but no clinical correlation with colitis      Discoloration of skin of foot  Assessment & Plan  · Unclear etiology, presenting with erythremia  Now resolved  Her daughter notes her skin is typically "blue"  · Low procal, afebrile, no leukocytosis thus doubt infectious process thus will monitored off abx  · S/p LE arterial duplex no evidence of significant arterial occulusive disease     Hypercalcemia  Assessment & Plan  · POA and has had intermittent elevations in the past  · Status post nephrology consult  Non suppressed PTH thus concern for primary hyperparathyroidism  · Status post endocrinology consult, pending SPEP and vitamin D levels  Continue to work-up as outpatient  · S/p CT C/A/P no occult mass to suggest underlying malignancy    HFrEF (heart failure with reduced ejection fraction) ([facility])  Assessment & Plan  Wt Readings from Last 3 Encounters:   02/16/23 62 6 kg (138 lb)   02/12/23 67 9 kg (149 lb 11 1 oz)   09/14/22 67 kg (147 lb 12 8 oz)   · Stress/stroke induced cardiomyopathy  History of cardiomyopathy in the past EF as low as 10 to 15% in 2004, more recently improved EF to 35 to 40%  · EF 25-30% on echo during prior admission   · Declined lifevest on prior admission due to stress  · Maintained on metoprolol succinate (changed from coreg prior admission), Not chronically on ace or arb  · Appears clinically compensated     · Daily weights / I and O's     HTN (hypertension)  Assessment & Plan  · On toprol xl    Rheumatoid arthritis ([facility] [address] )  Assessment & Plan  · Noted and follows up with rheum outpatient   · Continue plaquenil     Bipolar depression ([facility] [address] )  Assessment & Plan  · On buproprion, duloxetine, Lamictal   · Pending lamictal level     Chronic pain syndrome  Assessment & Plan  · Noted history   · PDMP reviewed on [place] contin 15 mg BID   · Per daughter would prefer to avoid additional narcotics    VTE Pharmacologic Prophylaxis:   Pharmacologic: Enoxaparin (Lovenox)  Mechanical VTE Prophylaxis in Place: No    Patient Centered Rounds: I have performed bedside rounds with nursing staff today  Discussions with Specialists or Other Care Team Provider:     Education and Discussions with Family / Patient: Patient and her daughter Becky Simon at length    Time Spent for Care: 30 minutes  More than 50% of total time spent on counseling and coordination of care as described above  Current Length of Stay: 2 day(s)    Current Patient Status: Inpatient   Certification Statement: The patient will continue to require additional inpatient hospital stay due to Acute illness    Discharge Plan: Discharge planning to rehab  Daughter wishing to consider alternative acute rehab    Code Status: Level 1 - Full Code      Subjective:   Sitting in chair  No acute events overnight  Daughter at bedside  Reports of improved mental status to baseline    Objective:     Vitals:   Temp (24hrs), Av °F (36 7 °C), Min:96 9 °F (36 1 °C), Max:98 6 °F (37 °C)    Temp:  [96 9 °F (36 1 °C)-98 6 °F (37 °C)] 96 9 °F (36 1 °C)  HR:  [] 95  Resp:  [16-20] 16  BP: ()/(61-94) 96/61  SpO2:  [95 %-100 %] 97 %  There is no height or weight on file to calculate BMI  Input and Output Summary (last 24 hours): Intake/Output Summary (Last 24 hours) at 2023 210  Last data filed at 2023 1419  Gross per 24 hour   Intake 820 ml   Output 1054 ml   Net -234 ml       Physical Exam:     Physical Exam  Cardiovascular:      Rate and Rhythm: Normal rate and regular rhythm  Pulses: Normal pulses  Heart sounds: Normal heart sounds  No murmur heard  Pulmonary:      Effort: Pulmonary effort is normal  No respiratory distress  Breath sounds: Normal breath sounds  No wheezing or rales  Abdominal:      General: Abdomen is flat  Bowel sounds are normal  There is no distension  Palpations: Abdomen is soft  Tenderness: There is no abdominal tenderness  There is no guarding     Musculoskeletal:      Cervical back: Normal range of motion and neck supple  Skin:     General: Skin is warm and dry  Neurological:      Mental Status: She is alert  Mental status is at baseline  Cranial Nerves: Cranial nerve deficit present  Motor: Weakness present  Comments: Positive dysarthria  Left sided weakness  Facial droop         Additional Data:     Labs:    Results from last 7 days   Lab Units 02/24/23  0422 02/23/23  0452   WBC Thousand/uL 7 20 7 20   HEMOGLOBIN g/dL 13 8 14 3   HEMATOCRIT % 40 9 43 8   PLATELETS Thousands/uL 240 261   NEUTROS PCT %  --  75   LYMPHS PCT %  --  14   MONOS PCT %  --  10   EOS PCT %  --  1     Results from last 7 days   Lab Units 02/24/23  0422 02/23/23  0452   SODIUM mmol/L 143 139   POTASSIUM mmol/L 3 2* 4 1   CHLORIDE mmol/L 113* 112*   CO2 mmol/L 25 23   BUN mg/dL 13 13   CREATININE mg/dL 0 78 0 84   ANION GAP mmol/L 5 4   CALCIUM mg/dL 10 7* 11 1*   ALBUMIN g/dL  --  3 1*   TOTAL BILIRUBIN mg/dL  --  0 54   ALK PHOS U/L  --  48   ALT U/L  --  19   AST U/L  --  22   GLUCOSE RANDOM mg/dL 129 81     Results from last 7 days   Lab Units 02/22/23  1400   INR  0 95             Results from last 7 days   Lab Units 02/22/23  1400   LACTIC ACID mmol/L 1 4   PROCALCITONIN ng/ml 0 06           * I Have Reviewed All Lab Data Listed Above  * Additional Pertinent Lab Tests Reviewed:  All Labs Within Last 24 Hours Reviewed    Imaging:    Imaging Reports Reviewed Today Include:   Imaging Personally Reviewed by Myself Includes:      Recent Cultures (last 7 days):     Results from last 7 days   Lab Units 02/22/23  1405 02/22/23  1402   GRAM STAIN RESULT  Gram positive cocci in clusters* Gram positive cocci in clusters*       Last 24 Hours Medication List:   Current Facility-Administered Medications   Medication Dose Route Frequency Provider Last Rate   • acetaminophen  650 mg Oral Q6H PRN Bang Espinoza PA-C     • albuterol  2 puff Inhalation Q6H PRN Sarah Staples PA-C     • aluminum-magnesium hydroxide-simethicone  30 mL Oral Q4H PRN Curt Caballero PA-C     • aspirin  81 mg Oral Daily Melida Talbert MD     • atorvastatin  40 mg Oral QPM Sarah Staples PA-C     • buPROPion  100 mg Oral BID Sarah Staples PA-C     • DULoxetine  60 mg Oral BID Sarah Staples PA-C     • enoxaparin  40 mg Subcutaneous Daily Sarah Staples PA-C     • gabapentin  300 mg Oral BID Sarah Staples PA-C     • hydroxychloroquine  200 mg Oral Once per day on Mon Tue Wed Thu Fri LAURA [place]     • [START ON 2/25/2023] hydroxychloroquine  400 mg Oral Once per day on Sun Sat LAURA [place]     • lamoTRIgine  150 mg Oral HS Sarah Staples PA-C     • metoprolol succinate  25 mg Oral BID Sarah Staples PA-C     • morphine  15 mg Oral BID Sarah Staples PA-C     • polyethylene glycol  17 g Oral Daily PRN Sarah Staples PA-C     • senna-docusate sodium  1 tablet Oral BID Sarah Staples PA-C     • vancomycin  25 mg/kg Intravenous Once Alicia Garcia MD     • [START ON 2/25/2023] vancomycin  750 mg Intravenous Q12H Alicia Garcia MD          Today, Patient Was Seen By: Roshan Guo DO    ** Please Note: Dictation voice to text software may have been used in the creation of this document   **

## 2023-02-25 ENCOUNTER — APPOINTMENT (INPATIENT)
Dept: RADIOLOGY | Facility: HOSPITAL | Age: 68
End: 2023-02-25

## 2023-02-25 LAB
1,25(OH)2D3 SERPL-MCNC: 23.7 PG/ML (ref 24.8–81.5)
ALBUMIN SERPL BCP-MCNC: 2.6 G/DL (ref 3.5–5)
ALBUMIN SERPL ELPH-MCNC: 3.34 G/DL (ref 3.5–5)
ALBUMIN SERPL ELPH-MCNC: 53 % (ref 52–65)
ALPHA1 GLOB SERPL ELPH-MCNC: 0.43 G/DL (ref 0.1–0.4)
ALPHA1 GLOB SERPL ELPH-MCNC: 6.8 % (ref 2.5–5)
ALPHA2 GLOB SERPL ELPH-MCNC: 0.88 G/DL (ref 0.4–1.2)
ALPHA2 GLOB SERPL ELPH-MCNC: 13.9 % (ref 7–13)
ANION GAP SERPL CALCULATED.3IONS-SCNC: 5 MMOL/L (ref 4–13)
BETA GLOB ABNORMAL SERPL ELPH-MCNC: 0.45 G/DL (ref 0.4–0.8)
BETA1 GLOB SERPL ELPH-MCNC: 7.1 % (ref 5–13)
BETA2 GLOB SERPL ELPH-MCNC: 5.6 % (ref 2–8)
BETA2+GAMMA GLOB SERPL ELPH-MCNC: 0.35 G/DL (ref 0.2–0.5)
BUN SERPL-MCNC: 12 MG/DL (ref 5–25)
CA-I BLD-SCNC: 1.29 MMOL/L (ref 1.12–1.32)
CALCIUM SERPL-MCNC: 10 MG/DL (ref 8.3–10.1)
CHLORIDE SERPL-SCNC: 113 MMOL/L (ref 96–108)
CO2 SERPL-SCNC: 22 MMOL/L (ref 21–32)
CREAT SERPL-MCNC: 0.7 MG/DL (ref 0.6–1.3)
GAMMA GLOB ABNORMAL SERPL ELPH-MCNC: 0.86 G/DL (ref 0.5–1.6)
GAMMA GLOB SERPL ELPH-MCNC: 13.6 % (ref 12–22)
GFR SERPL CREATININE-BSD FRML MDRD: 89 ML/MIN/1.73SQ M
GLUCOSE SERPL-MCNC: 98 MG/DL (ref 65–140)
IGG/ALB SER: 1.13 {RATIO} (ref 1.1–1.8)
MAGNESIUM SERPL-MCNC: 1.8 MG/DL (ref 1.6–2.6)
POTASSIUM SERPL-SCNC: 3.8 MMOL/L (ref 3.5–5.3)
PROT PATTERN SERPL ELPH-IMP: ABNORMAL
PROT SERPL-MCNC: 6.3 G/DL (ref 6.4–8.2)
SODIUM SERPL-SCNC: 140 MMOL/L (ref 135–147)

## 2023-02-25 RX ORDER — MAGNESIUM SULFATE HEPTAHYDRATE 40 MG/ML
2 INJECTION, SOLUTION INTRAVENOUS ONCE
Status: COMPLETED | OUTPATIENT
Start: 2023-02-25 | End: 2023-02-25

## 2023-02-25 RX ORDER — LANOLIN ALCOHOL/MO/W.PET/CERES
6 CREAM (GRAM) TOPICAL
Status: ON HOLD | COMMUNITY

## 2023-02-25 RX ORDER — LORATADINE 10 MG/1
10 CAPSULE, LIQUID FILLED ORAL DAILY
Status: ON HOLD | COMMUNITY

## 2023-02-25 RX ADMIN — ATORVASTATIN CALCIUM 40 MG: 40 TABLET, FILM COATED ORAL at 17:22

## 2023-02-25 RX ADMIN — GLYCERIN 1 DROP: .002; .002; .01 SOLUTION/ DROPS OPHTHALMIC at 17:23

## 2023-02-25 RX ADMIN — VANCOMYCIN HYDROCHLORIDE 750 MG: 750 INJECTION, SOLUTION INTRAVENOUS at 21:11

## 2023-02-25 RX ADMIN — MORPHINE SULFATE 15 MG: 15 TABLET, FILM COATED, EXTENDED RELEASE ORAL at 08:30

## 2023-02-25 RX ADMIN — METOPROLOL SUCCINATE 25 MG: 25 TABLET, EXTENDED RELEASE ORAL at 08:30

## 2023-02-25 RX ADMIN — HYDROXYCHLOROQUINE SULFATE 400 MG: 200 TABLET ORAL at 08:32

## 2023-02-25 RX ADMIN — GABAPENTIN 300 MG: 300 CAPSULE ORAL at 17:22

## 2023-02-25 RX ADMIN — ASPIRIN 81 MG: 81 TABLET, COATED ORAL at 08:30

## 2023-02-25 RX ADMIN — APIXABAN 5 MG: 5 TABLET, FILM COATED ORAL at 17:22

## 2023-02-25 RX ADMIN — DULOXETINE HYDROCHLORIDE 60 MG: 60 CAPSULE, DELAYED RELEASE ORAL at 21:11

## 2023-02-25 RX ADMIN — ACETAMINOPHEN 650 MG: 325 TABLET ORAL at 08:34

## 2023-02-25 RX ADMIN — ACETAMINOPHEN 650 MG: 325 TABLET ORAL at 17:21

## 2023-02-25 RX ADMIN — MORPHINE SULFATE 15 MG: 15 TABLET, FILM COATED, EXTENDED RELEASE ORAL at 17:22

## 2023-02-25 RX ADMIN — VANCOMYCIN HYDROCHLORIDE 750 MG: 750 INJECTION, SOLUTION INTRAVENOUS at 09:27

## 2023-02-25 RX ADMIN — MAGNESIUM SULFATE HEPTAHYDRATE 2 G: 40 INJECTION, SOLUTION INTRAVENOUS at 09:40

## 2023-02-25 RX ADMIN — BUPROPION HYDROCHLORIDE TABLETS 100 MG: 100 TABLET, FILM COATED ORAL at 17:23

## 2023-02-25 RX ADMIN — SENNOSIDES AND DOCUSATE SODIUM 1 TABLET: 8.6; 5 TABLET ORAL at 08:30

## 2023-02-25 RX ADMIN — ALUMINA, MAGNESIA, AND SIMETHICONE ORAL SUSPENSION REGULAR STRENGTH 30 ML: 1200; 1200; 120 SUSPENSION ORAL at 21:11

## 2023-02-25 RX ADMIN — BUPROPION HYDROCHLORIDE TABLETS 100 MG: 100 TABLET, FILM COATED ORAL at 08:31

## 2023-02-25 RX ADMIN — GABAPENTIN 300 MG: 300 CAPSULE ORAL at 08:30

## 2023-02-25 RX ADMIN — ENOXAPARIN SODIUM 40 MG: 40 INJECTION SUBCUTANEOUS at 08:30

## 2023-02-25 RX ADMIN — LAMOTRIGINE 150 MG: 25 TABLET ORAL at 21:11

## 2023-02-25 RX ADMIN — METOPROLOL SUCCINATE 25 MG: 25 TABLET, EXTENDED RELEASE ORAL at 17:21

## 2023-02-25 RX ADMIN — SENNOSIDES AND DOCUSATE SODIUM 1 TABLET: 8.6; 5 TABLET ORAL at 17:22

## 2023-02-25 RX ADMIN — DULOXETINE HYDROCHLORIDE 60 MG: 60 CAPSULE, DELAYED RELEASE ORAL at 08:30

## 2023-02-25 NOTE — ASSESSMENT & PLAN NOTE
· 1/2 micrococcus; 2nd set staph coag neg  · ID on board, appreciate input  · Obtained records from [facility], was txt with keflex from 2/17-21 for presumably UTI   · On exam no evidence of cellulitis  Presenting b/l feet diffuse erythema resolved and no evidence of skin breakage  · Empirically txting with vanco for now    · Follow-up repeat blood cultures

## 2023-02-25 NOTE — PROGRESS NOTES
Saskia Frank is a 75 y o  female who is currently receiving Vancomycin IV with management by the Pharmacy Consult service  Relevant clinical data and objective / subjective history reviewed  Vancomycin Assessment:  Indication and Goal AUC/Trough: Bacteremia (goal -600, trough >10)      Micro:     Renal Function:  SCr: 0 7 mg/dL (was 0 78 mg/dL)  CrCl: 63 6 ml/min (was 56 mL/min)  Renal replacement: Not on dialysis  Days of Therapy: 2  Current Dose:  750 mg q 12 hr    Vancomycin Plan:  New Dosing:  had load 1500 mg load 2/24 and then 750 mg q 12 hr                                                           Estimated AUC: 498 mcg hr/mL  Estimated Trough: 16 2 mcg/mL  Next Level: 2/26 with AM labs  Renal Function Monitoring: Daily BMP and [place] will continue to follow closely for s/sx of nephrotoxicity, infusion reactions and appropriateness of therapy  BMP and CBC will be ordered per protocol  We will continue to follow the patient’s culture results and clinical progress daily  ZEB Easley  Ph  Pharmacist

## 2023-02-25 NOTE — PLAN OF CARE
Problem: Nutrition/Hydration-ADULT  Goal: Nutrient/Hydration intake appropriate for improving, restoring or maintaining nutritional needs  Description: Monitor and assess patient's nutrition/hydration status for malnutrition  Collaborate with interdisciplinary team and initiate plan and interventions as ordered  Monitor patient's weight and dietary intake as ordered or per policy  Utilize nutrition screening tool and intervene as necessary  Determine patient's food preferences and provide high-protein, high-caloric foods as appropriate       INTERVENTIONS:  - Monitor oral intake, urinary output, labs, and treatment plans  - Assess nutrition and hydration status and recommend course of action  - Evaluate amount of meals eaten  - Assist patient with eating if necessary   - Allow adequate time for meals  - Recommend/ encourage appropriate diets, oral nutritional supplements, and vitamin/mineral supplements  - Order, calculate, and assess calorie counts as needed  - Recommend, monitor, and adjust tube feedings and TPN/PPN based on assessed needs  - Assess need for intravenous fluids  - Provide specific nutrition/hydration education as appropriate  - Include patient/family/caregiver in decisions related to nutrition  Outcome: Progressing     Problem: PAIN - ADULT  Goal: Verbalizes/displays adequate comfort level or baseline comfort level  Description: Interventions:  - Encourage patient to monitor pain and request assistance  - Assess pain using appropriate pain scale  - Administer analgesics based on type and severity of pain and evaluate response  - Implement non-pharmacological measures as appropriate and evaluate response  - Consider cultural and social influences on pain and pain management  - Notify physician/advanced practitioner if interventions unsuccessful or patient reports new pain  Outcome: Progressing     Problem: INFECTION - ADULT  Goal: Absence or prevention of progression during hospitalization  Description: INTERVENTIONS:  - Assess and monitor for signs and symptoms of infection  - Monitor lab/diagnostic results  - Monitor all insertion sites, i e  indwelling lines, tubes, and drains  - Monitor endotracheal if appropriate and nasal secretions for changes in amount and color  - [place] appropriate cooling/warming therapies per order  - Administer medications as ordered  - Instruct and encourage patient and family to use good hand hygiene technique  - Identify and instruct in appropriate isolation precautions for identified infection/condition  Outcome: Progressing     Problem: SAFETY ADULT  Goal: Patient will remain free of falls  Description: INTERVENTIONS:  - Educate patient/family on patient safety including physical limitations  - Instruct patient to call for assistance with activity   - Consult OT/PT to assist with strengthening/mobility   - Keep Call bell within reach  - Keep bed low and locked with side rails adjusted as appropriate  - Keep care items and personal belongings within reach  - Initiate and maintain comfort rounds  - Make Fall Risk Sign visible to staff  - Offer Toileting every 2 Hours, in advance of need  - Initiate/Maintain bed alarm  - Obtain necessary fall risk management equipment: bed alarm  - Apply yellow socks and bracelet for high fall risk patients  - Consider moving patient to room near nurses station  Outcome: Progressing  Goal: Maintain or return to baseline ADL function  Description: INTERVENTIONS:  -  Assess patient's ability to carry out ADLs; assess patient's baseline for ADL function and identify physical deficits which impact ability to perform ADLs (bathing, care of mouth/teeth, toileting, grooming, dressing, etc )  - Assess/evaluate cause of self-care deficits   - Assess range of motion  - Assess patient's mobility; develop plan if impaired  - Assess patient's need for assistive devices and provide as appropriate  - Encourage maximum independence but intervene and supervise when necessary  - Involve family in performance of ADLs  - Assess for home care needs following discharge   - Consider OT consult to assist with ADL evaluation and planning for discharge  - Provide patient education as appropriate  Outcome: Progressing  Goal: Maintains/Returns to pre admission functional level  Description: INTERVENTIONS:  - Perform BMAT or MOVE assessment daily    - Set and communicate daily mobility goal to care team and patient/family/caregiver  - Collaborate with rehabilitation services on mobility goals if consulted  - Perform Range of Motion 3 times a day  - Reposition patient every 2 hours  - Dangle patient 3 times a day  - Stand patient 3 times a day  - Ambulate patient 3 times a day  - Out of bed to chair 3 times a day   - Out of bed for meals 3 times a day  - Out of bed for toileting  - Record patient progress and toleration of activity level   Outcome: Progressing     Problem: DISCHARGE PLANNING  Goal: Discharge to home or other facility with appropriate resources  Description: INTERVENTIONS:  - Identify barriers to discharge w/patient and caregiver  - Arrange for needed discharge resources and transportation as appropriate  - Identify discharge learning needs (meds, wound care, etc )  - Arrange for interpretive services to assist at discharge as needed  - Refer to Case Management Department for coordinating discharge planning if the patient needs post-hospital services based on physician/advanced practitioner order or complex needs related to functional status, cognitive ability, or social support system  Outcome: Progressing     Problem: Knowledge Deficit  Goal: Patient/family/caregiver demonstrates understanding of disease process, treatment plan, medications, and discharge instructions  Description: Complete learning assessment and assess knowledge base    Interventions:  - Provide teaching at level of understanding  - Provide teaching via preferred learning methods  Outcome: Progressing     Problem: MOBILITY - ADULT  Goal: Maintain or return to baseline ADL function  Description: INTERVENTIONS:  -  Assess patient's ability to carry out ADLs; assess patient's baseline for ADL function and identify physical deficits which impact ability to perform ADLs (bathing, care of mouth/teeth, toileting, grooming, dressing, etc )  - Assess/evaluate cause of self-care deficits   - Assess range of motion  - Assess patient's mobility; develop plan if impaired  - Assess patient's need for assistive devices and provide as appropriate  - Encourage maximum independence but intervene and supervise when necessary  - Involve family in performance of ADLs  - Assess for home care needs following discharge   - Consider OT consult to assist with ADL evaluation and planning for discharge  - Provide patient education as appropriate  Outcome: Progressing  Goal: Maintains/Returns to pre admission functional level  Description: INTERVENTIONS:  - Perform BMAT or MOVE assessment daily    - Set and communicate daily mobility goal to care team and patient/family/caregiver  - Collaborate with rehabilitation services on mobility goals if consulted  - Perform Range of Motion 3 times a day  - Reposition patient every 2 hours    - Dangle patient 3 times a day  - Stand patient 3 times a day  - Ambulate patient 3 times a day  - Out of bed to chair 3 times a day   - Out of bed for meals 3 times a day  - Out of bed for toileting  - Record patient progress and toleration of activity level   Outcome: Progressing

## 2023-02-25 NOTE — PROGRESS NOTES
[address] [facility]  Progress Note - David Childers 1955, 75 y o  female MRN: [id number]  Unit/Bed#: [facility] 628-01 Encounter: [id number]  Primary Care Provider: Kofi Olivo DO   Date and time admitted to hospital: 2/22/2023  1:16 PM    * Acute metabolic encephalopathy  Assessment & Plan  · Presents with AMS from outpatient facility recently admitted to [facility] AND [facility] for CVA where she underwent revascularization of a right m1 occlusion (Dr Marti Riedel 2/12/2023),  · Etiology likely secondary to acute cva  S/p MRI revealing: new small acute /more recent interval lacunar infarction in the right cerebral peduncle  Versus questionable seizure event  · Also possibly component of metabolic etiology given hypercalcemia and positive blood cx  · Neurology on board  · Status post EEG  No initiation of antiepileptic medications per neurology  · Cont neuro checks  · MS appears much improved  · S/p speech eval cleared for pureed with thin liquid    Acute CVA (cerebrovascular accident) [facility])  Assessment & Plan  S/p MRI  Neurology on board  Recent hx of MCA CVA with petechial hemorrhage w residual L sided weakness and dysarthria s/p mechanical thrombectomy  Pt w/ severe contrast allergy resulting anaphylaxis, no need to repeat per neuro  Recent echo on 2/16/23  CVA prophylaxis: S/p repeat ct head stable; now initiated on eliquis  Cont statin      Bacteremia  Assessment & Plan  · 1/2 micrococcus; 2nd set staph coag neg  · ID on board, appreciate input  · Obtained records from [facility], was txt with keflex from 2/17-21 for presumably UTI   · On exam no evidence of cellulitis  Presenting b/l feet diffuse erythema resolved and no evidence of skin breakage  · Empirically txting with vanco for now  · Follow-up repeat blood cultures    Abdominal pain  Assessment & Plan        Discoloration of skin of foot  Assessment & Plan  · Unclear etiology, presenting with erythremia  Now resolved    Her daughter notes her skin is typically "blue"  · Low procal, afebrile, no leukocytosis thus doubt infectious process thus will monitored off abx  · S/p LE arterial duplex no evidence of significant arterial occulusive disease     Hypercalcemia  Assessment & Plan  · POA and has had intermittent elevations in the past  · Status post nephrology consult  Non suppressed PTH thus concern for primary hyperparathyroidism  · Status post endocrinology consult, pending SPEP and vitamin D levels  Continue to work-up as outpatient  · S/p CT C/A/P no occult mass to suggest underlying malignancy  · S/p repeat bmp today resolved    HFrEF (heart failure with reduced ejection fraction) ([facility])  Assessment & Plan  Wt Readings from Last 3 Encounters:   02/16/23 62 6 kg (138 lb)   02/12/23 67 9 kg (149 lb 11 1 oz)   09/14/22 67 kg (147 lb 12 8 oz)   · Stress/stroke induced cardiomyopathy  History of cardiomyopathy in the past EF as low as 10 to 15% in 2004, more recently improved EF to 35 to 40%  · EF 25-30% on echo during prior admission   · Declined lifevest on prior admission due to stress  · Maintained on metoprolol succinate (changed from coreg prior admission), Not chronically on ace or arb  · Appears clinically compensated  · Daily weights / I and O's     HTN (hypertension)  Assessment & Plan  · On toprol xl    Rheumatoid arthritis ([facility] [address] )  Assessment & Plan  · Noted and follows up with rheum outpatient   · Continue plaquenil     Bipolar depression ([facility] [address] )  Assessment & Plan  · On buproprion, duloxetine, Lamictal   · Pending lamictal level     Chronic pain syndrome  Assessment & Plan  · Noted history   · PDMP reviewed on [place] contin 15 mg BID   · Was also on prn oxycodone (at [facility]) but per daughter wants to to avoid additional narcotics      VTE Pharmacologic Prophylaxis:   Pharmacologic: Apixaban (Eliquis)  Mechanical VTE Prophylaxis in Place: No    Patient Centered Rounds: I have performed bedside rounds with nursing staff today      Discussions with Specialists or Other Care Team Provider:     Education and Discussions with Family / Patient: Patient  Also called her dtr Clarence    Time Spent for Care: 30 minutes  More than 50% of total time spent on counseling and coordination of care as described above  Current Length of Stay: 3 day(s)    Current Patient Status: Inpatient   Certification Statement: The patient will continue to require additional inpatient hospital stay due to acute illness    Discharge Plan:     Code Status: Level 1 - Full Code      Subjective:   Pt seen and examined at bedside  Sitting in chair  MS at baseline  No acute events overnight  Objective:     Vitals:   Temp (24hrs), Av 5 °F (36 4 °C), Min:97 2 °F (36 2 °C), Max:97 8 °F (36 6 °C)    Temp:  [97 2 °F (36 2 °C)-97 8 °F (36 6 °C)] 97 8 °F (36 6 °C)  HR:  [] 111  Resp:  [14-18] 18  BP: ()/(60-81) 114/72  SpO2:  [90 %-97 %] 93 %  There is no height or weight on file to calculate BMI  Input and Output Summary (last 24 hours): Intake/Output Summary (Last 24 hours) at 2023 1638  Last data filed at 2023 1402  Gross per 24 hour   Intake 440 ml   Output --   Net 440 ml       Physical Exam:     Physical Exam  Cardiovascular:      Rate and Rhythm: Normal rate and regular rhythm  Pulses: Normal pulses  Heart sounds: Normal heart sounds  Pulmonary:      Effort: Pulmonary effort is normal  No respiratory distress  Breath sounds: Normal breath sounds  No wheezing or rales  Abdominal:      General: Bowel sounds are normal  There is no distension  Palpations: Abdomen is soft  Tenderness: There is no abdominal tenderness  There is no guarding  Musculoskeletal:      Cervical back: Normal range of motion and neck supple  Right lower leg: No edema  Left lower leg: No edema  Skin:     General: Skin is warm and dry  Neurological:      Mental Status: She is alert and oriented to person, place, and time        Cranial Nerves: Cranial nerve deficit present  Motor: Weakness present  Comments: Dysarthria  L sided weakness  Facial asymmetry           Additional Data:     Labs:    Results from last 7 days   Lab Units 02/24/23  0422 02/23/23  0452   WBC Thousand/uL 7 20 7 20   HEMOGLOBIN g/dL 13 8 14 3   HEMATOCRIT % 40 9 43 8   PLATELETS Thousands/uL 240 261   NEUTROS PCT %  --  75   LYMPHS PCT %  --  14   MONOS PCT %  --  10   EOS PCT %  --  1     Results from last 7 days   Lab Units 02/25/23  0615 02/24/23  0422 02/23/23  0452   SODIUM mmol/L 140   < > 139   POTASSIUM mmol/L 3 8   < > 4 1   CHLORIDE mmol/L 113*   < > 112*   CO2 mmol/L 22   < > 23   BUN mg/dL 12   < > 13   CREATININE mg/dL 0 70   < > 0 84   ANION GAP mmol/L 5   < > 4   CALCIUM mg/dL 10 0   < > 11 1*   ALBUMIN g/dL 2 6*  --  3 1*   TOTAL BILIRUBIN mg/dL  --   --  0 54   ALK PHOS U/L  --   --  48   ALT U/L  --   --  19   AST U/L  --   --  22   GLUCOSE RANDOM mg/dL 98   < > 81    < > = values in this interval not displayed  Results from last 7 days   Lab Units 02/22/23  1400   INR  0 95             Results from last 7 days   Lab Units 02/22/23  1400   LACTIC ACID mmol/L 1 4   PROCALCITONIN ng/ml 0 06           * I Have Reviewed All Lab Data Listed Above  * Additional Pertinent Lab Tests Reviewed: All Labs Within Last 24 Hours Reviewed    Imaging:    Imaging Reports Reviewed Today Include:   Imaging Personally Reviewed by Myself Includes:      Recent Cultures (last 7 days):     Results from last 7 days   Lab Units 02/24/23  1525 02/24/23  1524 02/22/23  1405 02/22/23  1402   BLOOD CULTURE  Received in Microbiology Lab  Culture in Progress  Received in Microbiology Lab  Culture in Progress   Staphylococcus coagulase negative* Micrococcus luteus*   GRAM STAIN RESULT   --   --  Gram positive cocci in clusters* Gram positive cocci in clusters*       Last 24 Hours Medication List:   Current Facility-Administered Medications   Medication Dose Route Frequency Provider Last Rate   • acetaminophen  650 mg Oral Q6H PRN Sabino Baumgarten, PA-C     • albuterol  2 puff Inhalation Q6H PRN Sarah Staples PA-C     • aluminum-magnesium hydroxide-simethicone  30 mL Oral Q4H PRN Sabino Baumgarten, PA-C     • apixaban  5 mg Oral BID Jennifer Mcghee DO     • atorvastatin  40 mg Oral QPM Sarah Staples PA-C     • buPROPion  100 mg Oral BID Sarah Staples PA-C     • DULoxetine  60 mg Oral BID Sarah Staples PA-C     • gabapentin  300 mg Oral BID Sarah Staples PA-C     • glycerin-hypromellose-  1 drop Both Eyes Q4H PRN Diony Hunter DO     • hydroxychloroquine  200 mg Oral Once per day on Mon Tue Wed Thu Fri LAURA Werner     • hydroxychloroquine  400 mg Oral Once per day on Sun Sat LAURA Werner     • lamoTRIgine  150 mg Oral HS Sarah Staples PA-C     • metoprolol succinate  25 mg Oral BID Sarah Staples PA-C     • morphine  15 mg Oral BID Sarah Staples PA-C     • polyethylene glycol  17 g Oral Daily PRN Sarah Staples PA-C     • senna-docusate sodium  1 tablet Oral BID Sarah Staples PA-C     • vancomycin  750 mg Intravenous Q12H Torin Gavin MD          Today, Patient Was Seen By: Diony Hunter DO    ** Please Note: Dictation voice to text software may have been used in the creation of this document   **

## 2023-02-25 NOTE — ASSESSMENT & PLAN NOTE
· Noted history   · PDMP reviewed on [place] contin 15 mg BID   · Was also on prn oxycodone (at [facility]) but per daughter wants to to avoid additional narcotics

## 2023-02-25 NOTE — ASSESSMENT & PLAN NOTE
· Presents with AMS from outpatient facility recently admitted to [facility] AND [facility] for CVA where she underwent revascularization of a right m1 occlusion (Dr Pamela Osman 2/12/2023),  · Etiology likely secondary to acute cva  S/p MRI revealing: new small acute /more recent interval lacunar infarction in the right cerebral peduncle  Versus questionable seizure event  · Also possibly component of metabolic etiology given hypercalcemia and positive blood cx  · Neurology on board  · Status post EEG    No initiation of antiepileptic medications per neurology  · Cont neuro checks  · MS appears much improved  · S/p speech eval cleared for pureed with thin liquid

## 2023-02-25 NOTE — ASSESSMENT & PLAN NOTE
· POA and has had intermittent elevations in the past  · Status post nephrology consult  Non suppressed PTH thus concern for primary hyperparathyroidism  · Status post endocrinology consult, pending SPEP and vitamin D levels    Continue to work-up as outpatient  · S/p CT C/A/P no occult mass to suggest underlying malignancy  · S/p repeat bmp today resolved

## 2023-02-25 NOTE — NUTRITION
02/25/23 1340   Biochemical Data,Medical Tests, and Procedures   Speech Therapy Recommendations (Comment) 2/24 Puree and thin liquid   Recommendations/Interventions   Summary Patient currently on Level 1 Dysphagia diet with thin liquids and Magic cup supplement BID  Calorie count results 2/24 (one meal recorded); 200 calories (38% needs met) and 7 gms protein (30% needs met)  Patient reports approximately 50% meal completions of chicken, mashed potaotes and pudding  Calorie count to continue  Interventions/Recommendations Continue current diet order;Supplement adjust   Intervention Comments Ensure plus once daily ordered and Magic cup BID to continue  Will follow up with day 2 calorie count results and recommendations     Nutrition Complexity Risk   Nutrition complexity level High risk   Nutrition review: 02/26/23  (day 2 calorie count results)

## 2023-02-25 NOTE — PLAN OF CARE
Problem: Nutrition/Hydration-ADULT  Goal: Nutrient/Hydration intake appropriate for improving, restoring or maintaining nutritional needs  Description: Monitor and assess patient's nutrition/hydration status for malnutrition  Collaborate with interdisciplinary team and initiate plan and interventions as ordered  Monitor patient's weight and dietary intake as ordered or per policy  Utilize nutrition screening tool and intervene as necessary  Determine patient's food preferences and provide high-protein, high-caloric foods as appropriate       INTERVENTIONS:  - Monitor oral intake, urinary output, labs, and treatment plans  - Assess nutrition and hydration status and recommend course of action  - Evaluate amount of meals eaten  - Assist patient with eating if necessary   - Allow adequate time for meals  - Recommend/ encourage appropriate diets, oral nutritional supplements, and vitamin/mineral supplements  - Order, calculate, and assess calorie counts as needed  - Recommend, monitor, and adjust tube feedings and TPN/PPN based on assessed needs  - Assess need for intravenous fluids  - Provide specific nutrition/hydration education as appropriate  - Include patient/family/caregiver in decisions related to nutrition  Outcome: Progressing     Problem: PAIN - ADULT  Goal: Verbalizes/displays adequate comfort level or baseline comfort level  Description: Interventions:  - Encourage patient to monitor pain and request assistance  - Assess pain using appropriate pain scale  - Administer analgesics based on type and severity of pain and evaluate response  - Implement non-pharmacological measures as appropriate and evaluate response  - Consider cultural and social influences on pain and pain management  - Notify physician/advanced practitioner if interventions unsuccessful or patient reports new pain  Outcome: Progressing     Problem: INFECTION - ADULT  Goal: Absence or prevention of progression during hospitalization  Description: INTERVENTIONS:  - Assess and monitor for signs and symptoms of infection  - Monitor lab/diagnostic results  - Monitor all insertion sites, i e  indwelling lines, tubes, and drains  - Monitor endotracheal if appropriate and nasal secretions for changes in amount and color  - Eliot appropriate cooling/warming therapies per order  - Administer medications as ordered  - Instruct and encourage patient and family to use good hand hygiene technique  - Identify and instruct in appropriate isolation precautions for identified infection/condition  Outcome: Progressing     Problem: SAFETY ADULT  Goal: Patient will remain free of falls  Description: INTERVENTIONS:  - Educate patient/family on patient safety including physical limitations  - Instruct patient to call for assistance with activity   - Consult OT/PT to assist with strengthening/mobility   - Keep Call bell within reach  - Keep bed low and locked with side rails adjusted as appropriate  - Keep care items and personal belongings within reach  - Initiate and maintain comfort rounds  - Make Fall Risk Sign visible to staff  - Offer Toileting every 1 Hours, in advance of need  - Initiate/Maintain alarm  - Obtain necessary fall risk management equipment  - Apply yellow socks and bracelet for high fall risk patients  - Consider moving patient to room near nurses station  Outcome: Progressing  Goal: Maintain or return to baseline ADL function  Description: INTERVENTIONS:  -  Assess patient's ability to carry out ADLs; assess patient's baseline for ADL function and identify physical deficits which impact ability to perform ADLs (bathing, care of mouth/teeth, toileting, grooming, dressing, etc )  - Assess/evaluate cause of self-care deficits   - Assess range of motion  - Assess patient's mobility; develop plan if impaired  - Assess patient's need for assistive devices and provide as appropriate  - Encourage maximum independence but intervene and supervise when necessary  - Involve family in performance of ADLs  - Assess for home care needs following discharge   - Consider OT consult to assist with ADL evaluation and planning for discharge  - Provide patient education as appropriate  Outcome: Progressing  Goal: Maintains/Returns to pre admission functional level  Description: INTERVENTIONS:  - Perform BMAT or MOVE assessment daily    - Set and communicate daily mobility goal to care team and patient/family/caregiver  - Collaborate with rehabilitation services on mobility goals if consulted  - Perform Range of Motion 3 times a day  - Reposition patient every 2 hours  - Dangle patient 3 times a day  - Stand patient 3 times a day  - Ambulate patient 3 times a day  - Out of bed to chair 3 times a day   - Out of bed for meals 3 times a day  - Out of bed for toileting  - Record patient progress and toleration of activity level   Outcome: Progressing     Problem: DISCHARGE PLANNING  Goal: Discharge to home or other facility with appropriate resources  Description: INTERVENTIONS:  - Identify barriers to discharge w/patient and caregiver  - Arrange for needed discharge resources and transportation as appropriate  - Identify discharge learning needs (meds, wound care, etc )  - Arrange for interpretive services to assist at discharge as needed  - Refer to Case Management Department for coordinating discharge planning if the patient needs post-hospital services based on physician/advanced practitioner order or complex needs related to functional status, cognitive ability, or social support system  Outcome: Progressing     Problem: Knowledge Deficit  Goal: Patient/family/caregiver demonstrates understanding of disease process, treatment plan, medications, and discharge instructions  Description: Complete learning assessment and assess knowledge base    Interventions:  - Provide teaching at level of understanding  - Provide teaching via preferred learning methods  Outcome: Progressing     Problem: MOBILITY - ADULT  Goal: Maintain or return to baseline ADL function  Description: INTERVENTIONS:  -  Assess patient's ability to carry out ADLs; assess patient's baseline for ADL function and identify physical deficits which impact ability to perform ADLs (bathing, care of mouth/teeth, toileting, grooming, dressing, etc )  - Assess/evaluate cause of self-care deficits   - Assess range of motion  - Assess patient's mobility; develop plan if impaired  - Assess patient's need for assistive devices and provide as appropriate  - Encourage maximum independence but intervene and supervise when necessary  - Involve family in performance of ADLs  - Assess for home care needs following discharge   - Consider OT consult to assist with ADL evaluation and planning for discharge  - Provide patient education as appropriate  Outcome: Progressing  Goal: Maintains/Returns to pre admission functional level  Description: INTERVENTIONS:  - Perform BMAT or MOVE assessment daily    - Set and communicate daily mobility goal to care team and patient/family/caregiver  - Collaborate with rehabilitation services on mobility goals if consulted  - Perform Range of Motion 3 times a day  - Reposition patient every 2 hours    - Dangle patient 3 times a day  - Stand patient 3 times a day  - Ambulate patient 3 times a day  - Out of bed to chair 3 times a day   - Out of bed for meals 3 times a day  - Out of bed for toileting  - Record patient progress and toleration of activity level   Outcome: Progressing

## 2023-02-25 NOTE — ASSESSMENT & PLAN NOTE
S/p MRI  Neurology on board  Recent hx of MCA CVA with petechial hemorrhage w residual L sided weakness and dysarthria s/p mechanical thrombectomy  Pt w/ severe contrast allergy resulting anaphylaxis, no need to repeat per neuro  Recent echo on 2/16/23  CVA prophylaxis: S/p repeat ct head stable; now initiated on eliquis   Cont statin

## 2023-02-25 NOTE — QUICK NOTE
Elysia Sosa will need follow up in in 4 weeks with neurovascular AP  She will not require outpatient neurological testing  Pending for discharge: Per Primary Team    Repeat CT head completed on 02/25/2023 for reviewed with attending  This study demonstrated no change  The CT demonstrated stable evolving right MCA distribution infarct, and a small recent infarct in the right cerebellar peduncle that is better visualized on MRI  Plan:  - Continue atorvastatin 40 mg daily  - Start apixaban 5 mg twice daily  - Stop aspirin 81 mg daily   - Stop Lovenox 40 mg SQ daily as patient is receiving apixaban  - Seizure precautions  - Goal euglycemia, normothermia  - Continue telemetry  - Continue neurochecks  - PT/OT/speech as able   - STAT CT head without contrast for any acute changes in mental status or neurologic examination  Please contact neurology  - Rest per primary team     - Patient will need neurology follow-up in 4 weeks with a neurovascular AP  The patient will now require additional outpatient neurologic testing at this time     - No further inpatient neurology recommendations  Please call to neurology for further questions

## 2023-02-25 NOTE — PROGRESS NOTES
NEPHROLOGY PROGRESS NOTE   Peyton Jeanine 75 y o  female MRN: [id number]  Unit/Bed#: [facility] 628-01 Encounter: [id number]  Reason for Consult: Hypercalcemia    ASSESSMENT AND PLAN:  Patient is 50-year-old female with significant medical issues of systolic CHF, hypertension, rheumatoid arthritis, bipolar disease, presented with mental status change   We are consulted for hypercalcemia management     Hypercalcemia  -Noted to have chronic intermittent mild hypercalcemia issues in the past   -Serum calcium slowly improving 10 0 today   -Some of the initial work-up shows vitamin D 25-hydroxy level 41 7, PTH 65 4, ionized calcium 1 33, SPEP negative   -Pending results for vitamin D 125 dihydroxy level, PTH RP   -CT scan of chest, abdomen, pelvis shows right lower lobe calcified granuloma no obvious evidence of malignancy  -Given nonsuppressed PTH in the setting of mild hypercalcemia, this raises suspicion of PTH mediated process, suspected primary hyperparathyroidism  -Appreciate endocrine input  Will need outpatient endocrine follow-up for further evaluation  If increases further, may consider Sensipar   -Continue to remain off IV fluid   -Avoid any calcium supplements      Initial encephalopathy/altered mental status, this seems to have overall improved   Neurology following  [place] recent history of CVA      Hypokalemia, resolved with replacement       Systolic CHF, EF 25 to 09%  -Clinically seems fairly euvolemic   Currently remains on room air      Hypertension, blood pressure overall acceptable   Continue metoprolol      Discussed above plan detail with primary team who agrees with above recommendations     SUBJECTIVE:  Patient seen and examined at bedside  Denies nausea or vomiting      OBJECTIVE:  Current Weight:    Vitals:    02/25/23 1018   BP: 126/80   Pulse: (!) 111   Resp: 18   Temp: 97 5 °F (36 4 °C)   SpO2: 90%       Intake/Output Summary (Last 24 hours) at 2/25/2023 1144  Last data filed at 2/25/2023 1018  Gross per 24 hour   Intake 600 ml   Output 754 ml   Net -154 ml     Wt Readings from Last 3 Encounters:   02/16/23 62 6 kg (138 lb)   02/12/23 67 9 kg (149 lb 11 1 oz)   09/14/22 67 kg (147 lb 12 8 oz)     Temp Readings from Last 3 Encounters:   02/25/23 97 5 °F (36 4 °C)   02/16/23 98 °F (36 7 °C) (Axillary)   02/12/23 98 1 °F (36 7 °C)     BP Readings from Last 3 Encounters:   02/25/23 126/80   02/16/23 120/76   02/12/23 141/79     Pulse Readings from Last 3 Encounters:   02/25/23 (!) 111   02/16/23 (!) 116   02/12/23 77        Physical Examination:  Eyes: No conjunctival pallor present  Neck: No obvious lymphadenopathy appreciated  Respiratory: Bilateral air entry present  CVS: No significant edema  GI: Soft, nondistended  CNS: Active alert oriented x3  Skin: No new rash  Musculoskeletal: No obvious new gross deformity noted    Medications:    Current Facility-Administered Medications:   •  acetaminophen (TYLENOL) tablet 650 mg, 650 mg, Oral, Q6H PRN, Misti Saavedra PA-C, 650 mg at 02/25/23 5731  •  albuterol (PROVENTIL HFA,VENTOLIN HFA) inhaler 2 puff, 2 puff, Inhalation, Q6H PRN, Sarah Staples PA-C  •  aluminum-magnesium hydroxide-simethicone (MYLANTA) oral suspension 30 mL, 30 mL, Oral, Q4H PRN, Misti Saavedra PA-C  •  aspirin (ECOTRIN LOW STRENGTH) EC tablet 81 mg, 81 mg, Oral, Daily, Martha Herrera MD, 81 mg at 02/25/23 0830  •  atorvastatin (LIPITOR) tablet 40 mg, 40 mg, Oral, QPM, Sarah Staples PA-C, 40 mg at 02/24/23 1729  •  buPROPion [facility]) tablet 100 mg, 100 mg, Oral, BID, Sarah Staples PA-C, 100 mg at 02/25/23 0831  •  DULoxetine (CYMBALTA) delayed release capsule 60 mg, 60 mg, Oral, BID, Sarah Staples PA-C, 60 mg at 02/25/23 0830  •  enoxaparin (LOVENOX) subcutaneous injection 40 mg, 40 mg, Subcutaneous, Daily, Sarah Staples PA-C, 40 mg at 02/25/23 0830  •  gabapentin (NEURONTIN) capsule 300 mg, 300 mg, Oral, BID, Sarah Staples PA-C, 300 mg at 02/25/23 0830  • glycerin-hypromellose- (ARTIFICIAL TEARS) ophthalmic solution 1 drop, 1 drop, Both Eyes, Q4H PRN, Du Pierre DO  •  hydroxychloroquine (PLAQUENIL) tablet 200 mg, 200 mg, Oral, Once per day on Mon Tue Wed Thu Fri, Bonnie Lugo CRNP, 200 mg at 02/24/23 8896  •  hydroxychloroquine (PLAQUENIL) tablet 400 mg, 400 mg, Oral, Once per day on Sun Sat, Bonnie LugoLAURA, 400 mg at 02/25/23 9003  •  lamoTRIgine (LaMICtal) tablet 150 mg, 150 mg, Oral, HS, Sarah Staples PA-C, 150 mg at 02/24/23 2155  •  metoprolol succinate (TOPROL-XL) 24 hr tablet 25 mg, 25 mg, Oral, BID, Sarah Staples PA-C, 25 mg at 02/25/23 0830  •  morphine (MS CONTIN) ER tablet 15 mg, 15 mg, Oral, BID, Sarah Staples PA-C, 15 mg at 02/25/23 0830  •  polyethylene glycol (MIRALAX) packet 17 g, 17 g, Oral, Daily PRN, Sarah Staples PA-C  •  senna-docusate sodium (SENOKOT S) 8 6-50 mg per tablet 1 tablet, 1 tablet, Oral, BID, Sarah Staples PA-C, 1 tablet at 02/25/23 0830  •  vancomycin (VANCOCIN) IVPB Premix 750 mg, 750 mg, Intravenous, Q12H, Aaron Caban MD, 750 mg at 02/25/23 0135    Laboratory Results:  Results from last 7 days   Lab Units 02/25/23  0615 02/24/23  0422 02/23/23  0452 02/22/23  2004 02/22/23  1405 02/22/23  1400   WBC Thousand/uL  --  7 20 7 20  --  7 80  --    HEMOGLOBIN g/dL  --  13 8 14 3  --  13 6  --    HEMATOCRIT %  --  40 9 43 8  --  41 4  --    PLATELETS Thousands/uL  --  240 261 248 270  --    SODIUM mmol/L 140 143 139  --   --  142   POTASSIUM mmol/L 3 8 3 2* 4 1  --   --  4 4   CHLORIDE mmol/L 113* 113* 112*  --   --  111*   CO2 mmol/L 22 25 23  --   --  29   BUN mg/dL 12 13 13  --   --  15   CREATININE mg/dL 0 70 0 78 0 84  --   --  0 93   CALCIUM mg/dL 10 0 10 7* 11 1*  --   --  10 9*   MAGNESIUM mg/dL 1 8  --   --   --   --   --    PHOSPHORUS mg/dL  --  2 4  --   --   --   --        CT head wo contrast   Final Result by Monserrat Sullivan MD (02/25 0830)      No change   Stable evolving right MCA distribution infarct  Small recent infarct in the right cerebral peduncle identified on MRI is not well seen  Workstation performed: [id number]         VAS lower limb arterial duplex, complete bilateral   Final Result by Bibiana Estrada MD (02/23 1343)      CT chest abdomen pelvis wo contrast   Final Result by Janelle Silva MD (02/23 0911)      1  Mild questionable colonic wall thickening, which may be related to underdistention versus mild colitis in the appropriate clinical setting  2   Small postprocedural hematoma measuring up to 2 cm adjacent to the right femoral vessels  3   No acute abnormality in the chest             Workstation performed: [id number]         MRI brain wo contrast   Final Result by Saleem Lowery MD (02/22 2219)         1  New small acute /more recent interval lacunar infarction in the right cerebral peduncle (series 4, image 14)  2   Extensive multifocal large, previously present right middle cerebral artery infarction which is evolving with superimposed areas of evolving hemorrhage likely related to hemorrhagic transformation of subacute right MCA infarction  No significant    mass effect  3   Mild, chronic microangiopathy  Workstation performed: [id number]         CT head without contrast   Final Result by Megan Corea MD (02/22 1440)      No acute intracranial abnormality  Workstation performed: [id number]             Portions of the record may have been created with voice recognition software  Occasional wrong word or "sound a like" substitutions may have occurred due to the inherent limitations of voice recognition software  Read the chart carefully and recognize, using context, where substitutions have occurred

## 2023-02-26 LAB
ALL TARGETS: NOT DETECTED
ANION GAP SERPL CALCULATED.3IONS-SCNC: 1 MMOL/L (ref 4–13)
BACTERIA BLD CULT: ABNORMAL
BACTERIA BLD CULT: ABNORMAL
BACTERIA UR QL AUTO: ABNORMAL /HPF
BILIRUB UR QL STRIP: NEGATIVE
BUN SERPL-MCNC: 18 MG/DL (ref 5–25)
CA-I BLD-SCNC: 1.25 MMOL/L (ref 1.12–1.32)
CALCIUM SERPL-MCNC: 10 MG/DL (ref 8.3–10.1)
CHLORIDE SERPL-SCNC: 111 MMOL/L (ref 96–108)
CLARITY UR: CLEAR
CO2 SERPL-SCNC: 25 MMOL/L (ref 21–32)
COLOR UR: YELLOW
CREAT SERPL-MCNC: 0.87 MG/DL (ref 0.6–1.3)
GFR SERPL CREATININE-BSD FRML MDRD: 68 ML/MIN/1.73SQ M
GLUCOSE SERPL-MCNC: 112 MG/DL (ref 65–140)
GLUCOSE UR STRIP-MCNC: NEGATIVE MG/DL
GRAM STN SPEC: ABNORMAL
GRAM STN SPEC: ABNORMAL
HGB UR QL STRIP.AUTO: NEGATIVE
HYALINE CASTS #/AREA URNS LPF: ABNORMAL /LPF
KETONES UR STRIP-MCNC: NEGATIVE MG/DL
LEUKOCYTE ESTERASE UR QL STRIP: ABNORMAL
MRSA NOSE QL CULT: NORMAL
NITRITE UR QL STRIP: NEGATIVE
NON-SQ EPI CELLS URNS QL MICRO: ABNORMAL /HPF
PH UR STRIP.AUTO: 6 [PH]
POTASSIUM SERPL-SCNC: 3.9 MMOL/L (ref 3.5–5.3)
PROT UR STRIP-MCNC: ABNORMAL MG/DL
RBC #/AREA URNS AUTO: ABNORMAL /HPF
SODIUM SERPL-SCNC: 137 MMOL/L (ref 135–147)
SP GR UR STRIP.AUTO: 1.01 (ref 1–1.03)
UROBILINOGEN UR STRIP-ACNC: <2 MG/DL
VANCOMYCIN TROUGH SERPL-MCNC: 28 UG/ML (ref 10–20)
WBC #/AREA URNS AUTO: ABNORMAL /HPF

## 2023-02-26 RX ORDER — VANCOMYCIN HYDROCHLORIDE 1 G/200ML
1000 INJECTION, SOLUTION INTRAVENOUS EVERY 24 HOURS
Status: DISCONTINUED | OUTPATIENT
Start: 2023-02-26 | End: 2023-02-27

## 2023-02-26 RX ADMIN — VANCOMYCIN HYDROCHLORIDE 1000 MG: 1 INJECTION, SOLUTION INTRAVENOUS at 20:30

## 2023-02-26 RX ADMIN — MORPHINE SULFATE 15 MG: 15 TABLET, FILM COATED, EXTENDED RELEASE ORAL at 17:26

## 2023-02-26 RX ADMIN — LAMOTRIGINE 150 MG: 25 TABLET ORAL at 21:08

## 2023-02-26 RX ADMIN — ATORVASTATIN CALCIUM 40 MG: 40 TABLET, FILM COATED ORAL at 17:26

## 2023-02-26 RX ADMIN — GABAPENTIN 300 MG: 300 CAPSULE ORAL at 17:26

## 2023-02-26 RX ADMIN — BUPROPION HYDROCHLORIDE TABLETS 100 MG: 100 TABLET, FILM COATED ORAL at 17:27

## 2023-02-26 RX ADMIN — APIXABAN 5 MG: 5 TABLET, FILM COATED ORAL at 08:59

## 2023-02-26 RX ADMIN — MORPHINE SULFATE 15 MG: 15 TABLET, FILM COATED, EXTENDED RELEASE ORAL at 08:59

## 2023-02-26 RX ADMIN — SENNOSIDES AND DOCUSATE SODIUM 1 TABLET: 8.6; 5 TABLET ORAL at 08:59

## 2023-02-26 RX ADMIN — DULOXETINE HYDROCHLORIDE 60 MG: 60 CAPSULE, DELAYED RELEASE ORAL at 08:59

## 2023-02-26 RX ADMIN — HYDROXYCHLOROQUINE SULFATE 400 MG: 200 TABLET ORAL at 08:59

## 2023-02-26 RX ADMIN — APIXABAN 5 MG: 5 TABLET, FILM COATED ORAL at 17:26

## 2023-02-26 RX ADMIN — METOPROLOL SUCCINATE 25 MG: 25 TABLET, EXTENDED RELEASE ORAL at 17:26

## 2023-02-26 RX ADMIN — GABAPENTIN 300 MG: 300 CAPSULE ORAL at 08:59

## 2023-02-26 RX ADMIN — BUPROPION HYDROCHLORIDE TABLETS 100 MG: 100 TABLET, FILM COATED ORAL at 09:00

## 2023-02-26 RX ADMIN — DULOXETINE HYDROCHLORIDE 60 MG: 60 CAPSULE, DELAYED RELEASE ORAL at 21:08

## 2023-02-26 RX ADMIN — SENNOSIDES AND DOCUSATE SODIUM 1 TABLET: 8.6; 5 TABLET ORAL at 17:26

## 2023-02-26 RX ADMIN — METOPROLOL SUCCINATE 25 MG: 25 TABLET, EXTENDED RELEASE ORAL at 08:59

## 2023-02-26 NOTE — ASSESSMENT & PLAN NOTE
· 1/2 micrococcus; 2nd set staph coag neg  · ID on board, appreciate input  · Obtained records from [facility], was txt with keflex from 2/17-21 for presumably UTI   · On exam no evidence of cellulitis   Presenting b/l feet diffuse erythema resolved and no evidence of skin breakage  · Empirically txting with vanco for now; dosing per pharmacy  · Repeat blood cultures neg x 24 hours

## 2023-02-26 NOTE — ASSESSMENT & PLAN NOTE
· Presents with AMS from outpatient facility recently admitted to [facility] for CVA where she underwent revascularization of a right m1 occlusion (Dr Umesh Frank 2/12/2023),  · Etiology likely secondary to acute cva  S/p MRI revealing: new small acute /more recent interval lacunar infarction in the right cerebral peduncle  Versus questionable seizure event  · Also possibly component of metabolic etiology given hypercalcemia and positive blood cx  · Neurology on board  · Status post EEG    No initiation of antiepileptic medications per neurology  · Cont neuro checks  · MS appears much improved and back to baseline as confirmed per dtr  · S/p speech eval cleared for pureed with thin liquid

## 2023-02-26 NOTE — PROGRESS NOTES
NEPHROLOGY PROGRESS NOTE   Jose Vanegas 75 y o  female MRN: [id number]  Unit/Bed#: [facility] 628-01 Encounter: [id number]  Reason for Consult: Hypercalcemia    ASSESSMENT AND PLAN:  Patient is 16-year-old female with significant medical issues of systolic CHF, hypertension, rheumatoid arthritis, bipolar disease, presented with mental status change   We are consulted for hypercalcemia management     Hypercalcemia  -Noted to have chronic intermittent mild hypercalcemia issues in the past   -Serum calcium slowly improving 10 0 yesterday  No labs available today   -Some of the initial work-up shows vitamin D 25-hydroxy level 41 7, PTH 65 4, ionized calcium 1 33 have improving to 1 25 today, SPEP negative  Vitamin D 125 dihydroxy level 23 7 lower   -Pending results for PTH RP   -CT scan of chest, abdomen, pelvis shows right lower lobe calcified granuloma no obvious evidence of malignancy  -Given nonsuppressed PTH in the setting of mild hypercalcemia, this raises suspicion of PTH mediated process, suspected primary hyperparathyroidism  -Appreciate endocrine input  Will need outpatient endocrine follow-up for further evaluation  If increases further, may consider Sensipar   -Continue to remain off IV fluid   -Avoid any calcium supplements      Initial encephalopathy/altered mental status, this seems to have overall improved   Neurology following  Renee Reed recent history of CVA      Systolic CHF, EF 25 to 91%  -Clinically seems fairly euvolemic   Currently remains on room air      Hypertension, blood pressure overall acceptable   Continue metoprolol  Discussed above plan in detail with primary team and they agree with above recommendations  SUBJECTIVE:  Patient seen and examined at bedside    Denies chest pain, nausea, vomiting    OBJECTIVE:  Current Weight:    Vitals:    02/26/23 1100   BP: 120/82   Pulse: (!) 110   Resp: 16   Temp: 97 7 °F (36 5 °C)   SpO2: 93%       Intake/Output Summary (Last 24 hours) at 2/26/2023 1126  Last data filed at 2/26/2023 1001  Gross per 24 hour   Intake 720 ml   Output 300 ml   Net 420 ml     Wt Readings from Last 3 Encounters:   02/16/23 62 6 kg (138 lb)   02/12/23 67 9 kg (149 lb 11 1 oz)   09/14/22 67 kg (147 lb 12 8 oz)     Temp Readings from Last 3 Encounters:   02/26/23 97 7 °F (36 5 °C)   02/16/23 98 °F (36 7 °C) (Axillary)   02/12/23 98 1 °F (36 7 °C)     BP Readings from Last 3 Encounters:   02/26/23 120/82   02/16/23 120/76   02/12/23 141/79     Pulse Readings from Last 3 Encounters:   02/26/23 (!) 110   02/16/23 (!) 116   02/12/23 77        Physical Examination:  Eyes: Mild conjunctival pallor present  Neck: No obvious lymphadenopathy appreciated  Respiratory: Bilateral air entry present  CVS: No significant edema in legs  GI: Soft, nondistended  CNS: Alert oriented x3  Skin: No new rash  Musculoskeletal: No obvious new gross deformity noted    Medications:    Current Facility-Administered Medications:   •  acetaminophen (TYLENOL) tablet 650 mg, 650 mg, Oral, Q6H PRN, Frankie Arzate PA-C, 650 mg at 02/25/23 1721  •  albuterol (PROVENTIL HFA,VENTOLIN HFA) inhaler 2 puff, 2 puff, Inhalation, Q6H PRN, SHERRIE PadronC  •  aluminum-magnesium hydroxide-simethicone (MYLANTA) oral suspension 30 mL, 30 mL, Oral, Q4H PRN, Frankie Arzate PA-C, 30 mL at 02/25/23 2111  •  apixaban (ELIQUIS) tablet 5 mg, 5 mg, Oral, BID, Chante Duet, DO, 5 mg at 02/26/23 4495  •  atorvastatin (LIPITOR) tablet 40 mg, 40 mg, Oral, QPM, SHERRIE PadronC, 40 mg at 02/25/23 1722  •  buPROPion (WELLBUTRIN) tablet 100 mg, 100 mg, Oral, BID, SHERRIE PadronC, 100 mg at 02/26/23 0900  •  DULoxetine (CYMBALTA) delayed release capsule 60 mg, 60 mg, Oral, BID, Sarah Staples PA-C, 60 mg at 02/26/23 0859  •  gabapentin (NEURONTIN) capsule 300 mg, 300 mg, Oral, BID, Sarah Staples PA-C, 300 mg at 02/26/23 0859  •  glycerin-hypromellose- (ARTIFICIAL TEARS) ophthalmic solution 1 drop, 1 drop, Both Eyes, Q4H PRN, Yves Domingo DO, 1 drop at 02/25/23 1723  •  hydroxychloroquine (PLAQUENIL) tablet 200 mg, 200 mg, Oral, Once per day on Mon Tue Wed Thu Fri, Bonnie LAURA Lugo, 200 mg at 02/24/23 3568  •  hydroxychloroquine (PLAQUENIL) tablet 400 mg, 400 mg, Oral, Once per day on Sun Sat, Bonnie WatsonLAURA ceballos, 400 mg at 02/26/23 7111  •  lamoTRIgine (LaMICtal) tablet 150 mg, 150 mg, Oral, HS, Sarah Staples PA-C, 150 mg at 02/25/23 2111  •  metoprolol succinate (TOPROL-XL) 24 hr tablet 25 mg, 25 mg, Oral, BID, Sarah Staples PA-C, 25 mg at 02/26/23 2783  •  morphine (MS CONTIN) ER tablet 15 mg, 15 mg, Oral, BID, Sarah Staples PA-C, 15 mg at 02/26/23 6872  •  polyethylene glycol (MIRALAX) packet 17 g, 17 g, Oral, Daily PRN, Sarah Staples PA-C  •  senna-docusate sodium (SENOKOT S) 8 6-50 mg per tablet 1 tablet, 1 tablet, Oral, BID, Sarah Staples PA-C, 1 tablet at 02/26/23 0859  •  vancomycin (VANCOCIN) IVPB (premix in dextrose) 1,000 mg 200 mL, 1,000 mg, Intravenous, Q24H, Jill Bowles MD    Laboratory Results:  Results from last 7 days   Lab Units 02/25/23  0615 02/24/23  0422 02/23/23  0452 02/22/23  2004 02/22/23  1405 02/22/23  1400   WBC Thousand/uL  --  7 20 7 20  --  7 80  --    HEMOGLOBIN g/dL  --  13 8 14 3  --  13 6  --    HEMATOCRIT %  --  40 9 43 8  --  41 4  --    PLATELETS Thousands/uL  --  240 261 248 270  --    SODIUM mmol/L 140 143 139  --   --  142   POTASSIUM mmol/L 3 8 3 2* 4 1  --   --  4 4   CHLORIDE mmol/L 113* 113* 112*  --   --  111*   CO2 mmol/L 22 25 23  --   --  29   BUN mg/dL 12 13 13  --   --  15   CREATININE mg/dL 0 70 0 78 0 84  --   --  0 93   CALCIUM mg/dL 10 0 10 7* 11 1*  --   --  10 9*   MAGNESIUM mg/dL 1 8  --   --   --   --   --    PHOSPHORUS mg/dL  --  2 4  --   --   --   --        CT head wo contrast   Final Result by Everett Sylvester MD (02/25 0830)      No change  Stable evolving right MCA distribution infarct   Small recent infarct in the right cerebral peduncle identified on MRI is not well seen  Workstation performed: [id number]         VAS lower limb arterial duplex, complete bilateral   Final Result by Jostin Green MD (02/23 1343)      CT chest abdomen pelvis wo contrast   Final Result by Andre Aquino MD (02/23 0911)      1  Mild questionable colonic wall thickening, which may be related to underdistention versus mild colitis in the appropriate clinical setting  2   Small postprocedural hematoma measuring up to 2 cm adjacent to the right femoral vessels  3   No acute abnormality in the chest             Workstation performed: [id number]         MRI brain wo contrast   Final Result by Adriana Vora MD (02/22 2219)         1  New small acute /more recent interval lacunar infarction in the right cerebral peduncle (series 4, image 14)  2   Extensive multifocal large, previously present right middle cerebral artery infarction which is evolving with superimposed areas of evolving hemorrhage likely related to hemorrhagic transformation of subacute right MCA infarction  No significant    mass effect  3   Mild, chronic microangiopathy  Workstation performed: [id number]         CT head without contrast   Final Result by Sadi Carpio MD (02/22 1440)      No acute intracranial abnormality  Workstation performed: [id number]             Portions of the record may have been created with voice recognition software  Occasional wrong word or "sound a like" substitutions may have occurred due to the inherent limitations of voice recognition software  Read the chart carefully and recognize, using context, where substitutions have occurred

## 2023-02-26 NOTE — PLAN OF CARE
Problem: OCCUPATIONAL THERAPY ADULT  Goal: Performs self-care activities at highest level of function for planned discharge setting  See evaluation for individualized goals  Description: Treatment Interventions: ADL retraining, Functional transfer training, Endurance training, Cognitive reorientation, Patient/family training, Equipment evaluation/education, Compensatory technique education, Energy conservation, Activityengagement          See flowsheet documentation for full assessment, interventions and recommendations  Outcome: Progressing  Note: Limitation: Decreased ADL status, Decreased endurance, Decreased self-care trans, Decreased high-level ADLs, Decreased cognition, Decreased Safe judgement during ADL  Prognosis: Good  Assessment: Pt seen for participation in Occupational Therapy session with focus on activity tolerance,  functional transfers, sitting balance and tolerance and standing tolerance and balance for pt engagement in UB/LB self-care tasks and energy conservation techniques  Pt cleared by JULISA/Julia for pt participated in OT session  Pt presented sitting out of bed to bedside chair upon initiation of Janet and agreeable to participate in therapy following pt identifiers confirmed  Pt reported her therapy goal to improve her independence  Pt required assist for functional transfers and standing tolerance at sink 2* limited activity tolerance and decreased overall strength  SHe was able to tolerate sitting at sink for grooming and bathing tasks with no complaints of pain or fatigue  Pt will require post acute rehab service to continue to address these above noted pt deficit which currently impair pt ADL and functional mob  Pt remained sitting out of bed to bedside chair post session, chair alarm activated and all needs within reach       OT Discharge Recommendation: Post acute rehabilitation services

## 2023-02-26 NOTE — PROGRESS NOTES
Alexandru Gutiérrez is a 75 y o  female who is currently ordered Vancomycin IV with management by the Pharmacy Consult service  Relevant clinical data and objective / subjective history reviewed  Vancomycin Assessment:  Indication and Goal AUC/Trough: Bacteremia (goal -600, trough >10), -600, trough >10  Clinical Status: stable  Dose change today to start tonight at 8 PM due to high random trough of 28 0 at 0538 AM  Micro:     Renal Function:  SCr: 0 7 mg/dL  CrCl: 63 6 mL/min  Renal replacement: not on dialysis           Days of Therapy: 3  Current Dose: 750 mg q 12 hr  Vancomycin Plan:  New Dosing: vancomycin 1000 mg q 24 hr starting tonight at 2000    Estimated AUC: 494 mcg hr/mL (with new dosing)  Estimated Trough: 13 99 mcg/mL (with new dosing)  Next Level: random Tues 0600  Renal Function Monitoring: Daily BMP     Pharmacy will continue to follow closely for s/sx of nephrotoxicity, infusion reactions and appropriateness of therapy  BMP and CBC will be ordered per protocol  We will continue to follow the patient’s culture results and clinical progress daily      Jesica Frey, Pharmacist

## 2023-02-26 NOTE — PLAN OF CARE
PT reported po is improving  At this time nothing recorded on calorie count sheet for today, but based on % meal completions and interview with PT completing % at meals  Today had 100% of B meal and 100% of L meal consisted of roast beef, mashed potatoes and carrots, reported drinking all of ensure earlier and eating about 75% of magic cup, most likely PT meeting estimated needs  PT did requested texture for her meats, asked to have meat ground rather than pureed, explained will await speech therapy to re evaluate for any potential upgrade, PT understood  Continue oral nutrition supplements  Problem: Nutrition/Hydration-ADULT  Goal: Nutrient/Hydration intake appropriate for improving, restoring or maintaining nutritional needs  Description: Monitor and assess patient's nutrition/hydration status for malnutrition  Collaborate with interdisciplinary team and initiate plan and interventions as ordered  Monitor patient's weight and dietary intake as ordered or per policy  Utilize nutrition screening tool and intervene as necessary  Determine patient's food preferences and provide high-protein, high-caloric foods as appropriate       INTERVENTIONS:  - Monitor oral intake, urinary output, labs, and treatment plans  - Assess nutrition and hydration status and recommend course of action  - Evaluate amount of meals eaten  - Assist patient with eating if necessary   - Allow adequate time for meals  - Recommend/ encourage appropriate diets, oral nutritional supplements, and vitamin/mineral supplements  - Order, calculate, and assess calorie counts as needed  - Recommend, monitor, and adjust tube feedings and TPN/PPN based on assessed needs  - Assess need for intravenous fluids  - Provide specific nutrition/hydration education as appropriate  - Include patient/family/caregiver in decisions related to nutrition  Outcome: Progressing

## 2023-02-26 NOTE — PLAN OF CARE
Problem: Nutrition/Hydration-ADULT  Goal: Nutrient/Hydration intake appropriate for improving, restoring or maintaining nutritional needs  Description: Monitor and assess patient's nutrition/hydration status for malnutrition  Collaborate with interdisciplinary team and initiate plan and interventions as ordered  Monitor patient's weight and dietary intake as ordered or per policy  Utilize nutrition screening tool and intervene as necessary  Determine patient's food preferences and provide high-protein, high-caloric foods as appropriate       INTERVENTIONS:  - Monitor oral intake, urinary output, labs, and treatment plans  - Assess nutrition and hydration status and recommend course of action  - Evaluate amount of meals eaten  - Assist patient with eating if necessary   - Allow adequate time for meals  - Recommend/ encourage appropriate diets, oral nutritional supplements, and vitamin/mineral supplements  - Order, calculate, and assess calorie counts as needed  - Recommend, monitor, and adjust tube feedings and TPN/PPN based on assessed needs  - Assess need for intravenous fluids  - Provide specific nutrition/hydration education as appropriate  - Include patient/family/caregiver in decisions related to nutrition  Outcome: Progressing     Problem: PAIN - ADULT  Goal: Verbalizes/displays adequate comfort level or baseline comfort level  Description: Interventions:  - Encourage patient to monitor pain and request assistance  - Assess pain using appropriate pain scale  - Administer analgesics based on type and severity of pain and evaluate response  - Implement non-pharmacological measures as appropriate and evaluate response  - Consider cultural and social influences on pain and pain management  - Notify physician/advanced practitioner if interventions unsuccessful or patient reports new pain  Outcome: Progressing     Problem: INFECTION - ADULT  Goal: Absence or prevention of progression during hospitalization  Description: INTERVENTIONS:  - Assess and monitor for signs and symptoms of infection  - Monitor lab/diagnostic results  - Monitor all insertion sites, i e  indwelling lines, tubes, and drains  - Monitor endotracheal if appropriate and nasal secretions for changes in amount and color  - [place] appropriate cooling/warming therapies per order  - Administer medications as ordered  - Instruct and encourage patient and family to use good hand hygiene technique  - Identify and instruct in appropriate isolation precautions for identified infection/condition  Outcome: Progressing     Problem: SAFETY ADULT  Goal: Patient will remain free of falls  Description: INTERVENTIONS:  - Educate patient/family on patient safety including physical limitations  - Instruct patient to call for assistance with activity   - Consult OT/PT to assist with strengthening/mobility   - Keep Call bell within reach  - Keep bed low and locked with side rails adjusted as appropriate  - Keep care items and personal belongings within reach  - Initiate and maintain comfort rounds  - Make Fall Risk Sign visible to staff  - Offer Toileting every  Hours, in advance of need  - Initiate/Maintain alarm  - Obtain necessary fall risk management equipment:   - Apply yellow socks and bracelet for high fall risk patients  - Consider moving patient to room near nurses station  Outcome: Progressing  Goal: Maintain or return to baseline ADL function  Description: INTERVENTIONS:  -  Assess patient's ability to carry out ADLs; assess patient's baseline for ADL function and identify physical deficits which impact ability to perform ADLs (bathing, care of mouth/teeth, toileting, grooming, dressing, etc )  - Assess/evaluate cause of self-care deficits   - Assess range of motion  - Assess patient's mobility; develop plan if impaired  - Assess patient's need for assistive devices and provide as appropriate  - Encourage maximum independence but intervene and supervise when necessary  - Involve family in performance of ADLs  - Assess for home care needs following discharge   - Consider OT consult to assist with ADL evaluation and planning for discharge  - Provide patient education as appropriate  Outcome: Progressing  Goal: Maintains/Returns to pre admission functional level  Description: INTERVENTIONS:  - Perform BMAT or MOVE assessment daily    - Set and communicate daily mobility goal to care team and patient/family/caregiver  - Collaborate with rehabilitation services on mobility goals if consulted  - Perform Range of Motion  times a day  - Reposition patient every  hours  - Dangle patient  times a day  - Stand patient  times a day  - Ambulate patient  times a day  - Out of bed to chair  times a day   - Out of bed for meals  times a day  - Out of bed for toileting  - Record patient progress and toleration of activity level   Outcome: Progressing     Problem: DISCHARGE PLANNING  Goal: Discharge to home or other facility with appropriate resources  Description: INTERVENTIONS:  - Identify barriers to discharge w/patient and caregiver  - Arrange for needed discharge resources and transportation as appropriate  - Identify discharge learning needs (meds, wound care, etc )  - Arrange for interpretive services to assist at discharge as needed  - Refer to Case Management Department for coordinating discharge planning if the patient needs post-hospital services based on physician/advanced practitioner order or complex needs related to functional status, cognitive ability, or social support system  Outcome: Progressing     Problem: Knowledge Deficit  Goal: Patient/family/caregiver demonstrates understanding of disease process, treatment plan, medications, and discharge instructions  Description: Complete learning assessment and assess knowledge base    Interventions:  - Provide teaching at level of understanding  - Provide teaching via preferred learning methods  Outcome: Progressing     Problem: MOBILITY - ADULT  Goal: Maintain or return to baseline ADL function  Description: INTERVENTIONS:  -  Assess patient's ability to carry out ADLs; assess patient's baseline for ADL function and identify physical deficits which impact ability to perform ADLs (bathing, care of mouth/teeth, toileting, grooming, dressing, etc )  - Assess/evaluate cause of self-care deficits   - Assess range of motion  - Assess patient's mobility; develop plan if impaired  - Assess patient's need for assistive devices and provide as appropriate  - Encourage maximum independence but intervene and supervise when necessary  - Involve family in performance of ADLs  - Assess for home care needs following discharge   - Consider OT consult to assist with ADL evaluation and planning for discharge  - Provide patient education as appropriate  Outcome: Progressing  Goal: Maintains/Returns to pre admission functional level  Description: INTERVENTIONS:  - Perform BMAT or MOVE assessment daily    - Set and communicate daily mobility goal to care team and patient/family/caregiver  - Collaborate with rehabilitation services on mobility goals if consulted  - Perform Range of Motion  times a day  - Reposition patient every  hours    - Dangle patient  times a day  - Stand patient  times a day  - Ambulate patient  times a day  - Out of bed to chair  times a day   - Out of bed for meal times a day  - Out of bed for toileting  - Record patient progress and toleration of activity level   Outcome: Progressing     Problem: Prexisting or High Potential for Compromised Skin Integrity  Goal: Skin integrity is maintained or improved  Description: INTERVENTIONS:  - Identify patients at risk for skin breakdown  - Assess and monitor skin integrity  - Assess and monitor nutrition and hydration status  - Monitor labs   - Assess for incontinence   - Turn and reposition patient  - Assist with mobility/ambulation  - Relieve pressure over bony prominences  - Avoid friction and shearing  - Provide appropriate hygiene as needed including keeping skin clean and dry  - Evaluate need for skin moisturizer/barrier cream  - Collaborate with interdisciplinary team   - Patient/family teaching  - Consider wound care consult   Outcome: Progressing

## 2023-02-26 NOTE — PLAN OF CARE
Problem: PHYSICAL THERAPY ADULT  Goal: Performs mobility at highest level of function for planned discharge setting  See evaluation for individualized goals  Description: Treatment/Interventions: Functional transfer training, LE strengthening/ROM, Therapeutic exercise, Endurance training, Patient/family training, Equipment eval/education, Bed mobility, Gait training, Spoke to nursing, OT  Equipment Recommended: Hazel Rivas       See flowsheet documentation for full assessment, interventions and recommendations  Outcome: Progressing  Note: Prognosis: Good  Problem List: Decreased strength, Decreased endurance, Impaired balance, Decreased mobility, Decreased cognition, Decreased safety awareness  Assessment: Pt able to perform multiple ambulation trials this session, impulsive at times, improves with cues for safety awareness and gait mechanics  Pt very motivated to improve and participate, daughter present during session and supportive  Pt with L lateral lean in standing most notable with fatigue and rotational turns, required modA to maintain control/balance  Pt will benefit from continued skilled PT intervention during course of hospital stay to address mobility impairments  Pt is an excellent rehab candidate, will be able to tolerate 3+ hours of skilled therapy with appropriate rest breaks  Recommend IP rehab upon hospital D/C  PT Discharge Recommendation: Post acute rehabilitation services    See flowsheet documentation for full assessment

## 2023-02-26 NOTE — PROGRESS NOTES
[address] [facility]  Progress Note - Kieran Maya 1955, 75 y o  female MRN: [id number]  Unit/Bed#: [facility] 628-01 Encounter: [id number]  Primary Care Provider: Nelson Abdi DO   Date and time admitted to hospital: 2/22/2023  1:16 PM    * Acute metabolic encephalopathy  Assessment & Plan  · Presents with AMS from outpatient facility recently admitted to [facility] for CVA where she underwent revascularization of a right m1 occlusion (Dr Flor Mejía 2/12/2023),  · Etiology likely secondary to acute cva  S/p MRI revealing: new small acute /more recent interval lacunar infarction in the right cerebral peduncle  Versus questionable seizure event  · Also possibly component of metabolic etiology given hypercalcemia and positive blood cx  · Neurology on board  · Status post EEG  No initiation of antiepileptic medications per neurology  · Cont neuro checks  · MS appears much improved and back to baseline as confirmed per dtr  · S/p speech eval cleared for pureed with thin liquid    Acute CVA (cerebrovascular accident) [facility])  Assessment & Plan  S/p MRI  Neurology on board  Recent hx of MCA CVA with petechial hemorrhage w residual L sided weakness and dysarthria s/p mechanical thrombectomy  Pt w/ severe contrast allergy resulting anaphylaxis, no need to repeat per neuro  Recent echo on 2/16/23  CVA prophylaxis: S/p repeat ct head stable; now initiated on eliquis  Cont statin      Bacteremia  Assessment & Plan  · 1/2 micrococcus; 2nd set staph coag neg  · ID on board, appreciate input  · Obtained records from [facility], was txt with keflex from 2/17-21 for presumably UTI   · On exam no evidence of cellulitis  Presenting b/l feet diffuse erythema resolved and no evidence of skin breakage  · Empirically txting with vanco for now; dosing per pharmacy  · Repeat blood cultures neg x 24 hours    Discoloration of skin of foot  Assessment & Plan  · Unclear etiology, presenting with erythremia  Now resolved  Her daughter notes her skin is typically "blue"  · Low procal, afebrile, no leukocytosis thus doubt infectious process thus will monitored off abx  · S/p LE arterial duplex no evidence of significant arterial occulusive disease     Hypercalcemia  Assessment & Plan  · POA and has had intermittent elevations in the past  · Status post nephrology consult  Non suppressed PTH thus concern for primary hyperparathyroidism  · Status post endocrinology consult, s/p SPEP no monoclonal bands, nml vit D hydroxy levels  Continue to work-up as outpatient  · S/p CT C/A/P no occult mass to suggest underlying malignancy  · resolved    HFrEF (heart failure with reduced ejection fraction) ([facility])  Assessment & Plan  Wt Readings from Last 3 Encounters:   02/16/23 62 6 kg (138 lb)   02/12/23 67 9 kg (149 lb 11 1 oz)   09/14/22 67 kg (147 lb 12 8 oz)   · Stress/stroke induced cardiomyopathy  History of cardiomyopathy in the past EF as low as 10 to 15% in 2004, more recently improved EF to 35 to 40%  · EF 25-30% on echo during prior admission   · Declined lifevest on prior admission due to stress  · Maintained on metoprolol succinate (changed from coreg prior admission), Not chronically on ace or arb  · Appears clinically compensated     · Daily weights / I and O's     HTN (hypertension)  Assessment & Plan  · On toprol xl    Rheumatoid arthritis ([facility] [address] )  Assessment & Plan  · Noted and follows up with rheum outpatient   · Continue plaquenil     Bipolar depression ([facility] [address] )  Assessment & Plan  · On buproprion, duloxetine, Lamictal   · Pending lamictal level     Chronic pain syndrome  Assessment & Plan  · Noted history   · PDMP reviewed on MS contin 15 mg BID   · Was also on prn oxycodone (at [facility]) but per daughter wants to to avoid additional narcotics      VTE Pharmacologic Prophylaxis:   Pharmacologic: Apixaban (Eliquis)  Mechanical VTE Prophylaxis in Place: No    Patient Centered Rounds: I have performed bedside rounds with nursing staff today     Discussions with Specialists or Other Care Team Provider:     Education and Discussions with Family / Patient: patient and called her dtr porter    Time Spent for Care: 30 minutes  More than 50% of total time spent on counseling and coordination of care as described above  Current Length of Stay: 4 day(s)    Current Patient Status: Inpatient   Certification Statement: The patient will continue to require additional inpatient hospital stay due to acute illness    Discharge Plan: Hopefully d/c planning tomorrow pending dispo    Code Status: Level 1 - Full Code      Subjective:   No change in MS, neuro exam  No acute events overnight    Objective:     Vitals:   Temp (24hrs), Av 4 °F (36 3 °C), Min:96 7 °F (35 9 °C), Max:97 7 °F (36 5 °C)    Temp:  [96 7 °F (35 9 °C)-97 7 °F (36 5 °C)] 97 5 °F (36 4 °C)  HR:  [102-115] 105  Resp:  [14-16] 16  BP: (100-121)/(68-82) 113/74  SpO2:  [93 %-96 %] 95 %  There is no height or weight on file to calculate BMI  Input and Output Summary (last 24 hours): Intake/Output Summary (Last 24 hours) at 2023 1828  Last data filed at 2023 1001  Gross per 24 hour   Intake 520 ml   Output 300 ml   Net 220 ml       Physical Exam:     Physical Exam  Cardiovascular:      Rate and Rhythm: Normal rate and regular rhythm  Pulses: Normal pulses  Heart sounds: Normal heart sounds  Pulmonary:      Effort: Pulmonary effort is normal  No respiratory distress  Breath sounds: Normal breath sounds  No wheezing or rales  Abdominal:      General: Abdomen is flat  Bowel sounds are normal  There is no distension  Palpations: Abdomen is soft  Tenderness: There is no abdominal tenderness  There is no guarding  Musculoskeletal:      Cervical back: Normal range of motion and neck supple  Right lower leg: No edema  Left lower leg: No edema  Skin:     General: Skin is warm and dry  Neurological:      Mental Status: She is alert  Motor: Weakness present  Comments: + Left sided weakness  Dysarthria  Facial drop           Additional Data:     Labs:    Results from last 7 days   Lab Units 02/24/23  0422 02/23/23  0452   WBC Thousand/uL 7 20 7 20   HEMOGLOBIN g/dL 13 8 14 3   HEMATOCRIT % 40 9 43 8   PLATELETS Thousands/uL 240 261   NEUTROS PCT %  --  75   LYMPHS PCT %  --  14   MONOS PCT %  --  10   EOS PCT %  --  1     Results from last 7 days   Lab Units 02/26/23  1151 02/25/23  0615 02/24/23  0422 02/23/23  0452   SODIUM mmol/L 137 140   < > 139   POTASSIUM mmol/L 3 9 3 8   < > 4 1   CHLORIDE mmol/L 111* 113*   < > 112*   CO2 mmol/L 25 22   < > 23   BUN mg/dL 18 12   < > 13   CREATININE mg/dL 0 87 0 70   < > 0 84   ANION GAP mmol/L 1* 5   < > 4   CALCIUM mg/dL 10 0 10 0   < > 11 1*   ALBUMIN g/dL  --  2 6*  --  3 1*   TOTAL BILIRUBIN mg/dL  --   --   --  0 54   ALK PHOS U/L  --   --   --  48   ALT U/L  --   --   --  19   AST U/L  --   --   --  22   GLUCOSE RANDOM mg/dL 112 98   < > 81    < > = values in this interval not displayed  Results from last 7 days   Lab Units 02/22/23  1400   INR  0 95             Results from last 7 days   Lab Units 02/22/23  1400   LACTIC ACID mmol/L 1 4   PROCALCITONIN ng/ml 0 06           * I Have Reviewed All Lab Data Listed Above  * Additional Pertinent Lab Tests Reviewed: All Labs Within Last 24 Hours Reviewed    Imaging:    Imaging Reports Reviewed Today Include:   Imaging Personally Reviewed by Myself Includes:      Recent Cultures (last 7 days):     Results from last 7 days   Lab Units 02/24/23  1525 02/24/23  1524 02/22/23  1405 02/22/23  1402   BLOOD CULTURE  No Growth at 24 hrs  No Growth at 24 hrs   Staphylococcus coagulase negative* Micrococcus luteus*   GRAM STAIN RESULT   --   --  Gram positive cocci in clusters* Gram positive cocci in clusters*       Last 24 Hours Medication List:   Current Facility-Administered Medications   Medication Dose Route Frequency Provider Last Rate   • acetaminophen  650 mg Oral Q6H PRN Elena Marvin PA-C     • albuterol  2 puff Inhalation Q6H PRN Sarah Staples PA-C     • aluminum-magnesium hydroxide-simethicone  30 mL Oral Q4H PRN Elena Marvin PA-C     • apixaban  5 mg Oral BID Bam Biggs DO     • atorvastatin  40 mg Oral QPM Sarah Staples PA-C     • buPROPion  100 mg Oral BID Sarah Staples PA-C     • DULoxetine  60 mg Oral BID Sarah Staples PA-C     • gabapentin  300 mg Oral BID Sarah Staples PA-C     • glycerin-hypromellose-  1 drop Both Eyes Q4H PRN Laila Kahn DO     • hydroxychloroquine  200 mg Oral Once per day on Mon Tue Wed Thu Fri LAURA Posada     • hydroxychloroquine  400 mg Oral Once per day on Sun Sat LAURA Posada     • lamoTRIgine  150 mg Oral HS Sarah Staples PA-C     • metoprolol succinate  25 mg Oral BID Sarah Staples PA-C     • morphine  15 mg Oral BID Sarah Staples PA-C     • polyethylene glycol  17 g Oral Daily PRN Sarah Staples PA-C     • senna-docusate sodium  1 tablet Oral BID Sarah Staples PA-C     • vancomycin  1,000 mg Intravenous Q24H Ann Zarate MD          Today, Patient Was Seen By: Laila Kahn DO    ** Please Note: Dictation voice to text software may have been used in the creation of this document   **

## 2023-02-26 NOTE — OCCUPATIONAL THERAPY NOTE
Occupational Therapy Treatment Note     02/26/23 1126   OT Last Visit   OT Visit Date 02/26/23   Note Type   Note Type Treatment for insurance authorization   Pain Assessment   Pain Assessment Tool 0-10   Pain Score No Pain   Restrictions/Precautions   Weight Bearing Precautions Per Order No   Lifestyle   Autonomy pta, pt reports she was I W ADL/IADL, rw mobility  +   Reciprocal Relationships supportive brother and son- was at [facility] prior to admission here   Service to Others retired   Intrinsic Gratification keeping active   ADL   Where Assessed Sitting at [address] [facility] 5  Supervision/Setup   Grooming Deficit Setup;Verbal cueing   UB Bathing Assistance 3  Moderate Assistance   UB Bathing Deficit Right arm;Left arm   LB Bathing Assistance 4  Minimal Assistance   LB Bathing Deficit Buttocks;Right lower leg including foot; Left lower leg including foot   UB Dressing Assistance 4  Minimal Assistance   LB Dressing Assistance 3  Moderate Assistance   Transfers   Sit to Stand 3  Moderate assistance   Additional items Assist x 1   Stand to Sit 3  Moderate assistance   Additional items Assist x 1   Subjective   Subjective " I would love to wash my hair"   Cognition   Overall Cognitive Status Impaired   Arousal/Participation Alert; Responsive; Cooperative   Attention Attends with cues to redirect   Orientation Level Oriented X4   Memory Decreased recall of precautions   Following Commands Follows one step commands with increased time or repetition   Activity Tolerance   Activity Tolerance Patient tolerated treatment well   Assessment   Assessment Pt seen for participation in Occupational Therapy session with focus on activity tolerance,  functional transfers, sitting balance and tolerance and standing tolerance and balance for pt engagement in UB/LB self-care tasks and energy conservation techniques  Pt cleared by RN/Julia for pt participated in OT session   Pt presented sitting out of bed to bedside chair upon initiation of Janet and agreeable to participate in therapy following pt identifiers confirmed  Pt reported her therapy goal to improve her independence  Pt required assist for functional transfers and standing tolerance at sink 2* limited activity tolerance and decreased overall strength  SHe was able to tolerate sitting at sink for grooming and bathing tasks with no complaints of pain or fatigue  Pt will require post acute rehab service to continue to address these above noted pt deficit which currently impair pt ADL and functional mob  Pt remained sitting out of bed to bedside chair post session, chair alarm activated and all needs within reach     Plan   Treatment Interventions ADL retraining;Functional transfer training   Goal Expiration Date 03/10/23   OT Treatment Day 1   OT Frequency 2-3x/wk   Recommendation   OT Discharge Recommendation Post acute rehabilitation services   AM-PAC Daily Activity Inpatient   Lower Body Dressing 2   Bathing 2   Toileting 2   Upper Body Dressing 3   Grooming 3   Eating 4   Daily Activity Raw Score 16   Daily Activity Standardized Score (Calc for Raw Score >=11) 35 96   AM-PAC Applied Cognition Inpatient   Following a Speech/Presentation 3   Understanding Ordinary Conversation 3   Taking Medications 3   Remembering Where Things Are Placed or Put Away 2   Remembering List of 4-5 Errands 2   Taking Care of Complicated Tasks 2   Applied Cognition Raw Score 15   Applied Cognition Standardized Score 33 54   Barthel Index   Grooming Score 5   Dressing Score 5   Toilet Use Score 5   Transfers (Bed/Chair) Score 5   Mobility (Level Surface) Score 0         Dumont Muta  SILVERMAN/L

## 2023-02-26 NOTE — ASSESSMENT & PLAN NOTE
· POA and has had intermittent elevations in the past  · Status post nephrology consult   Non suppressed PTH thus concern for primary hyperparathyroidism  · Status post endocrinology consult, s/p SPEP no monoclonal bands, nml vit D hydroxy levels  Continue to work-up as outpatient  · S/p CT C/A/P no occult mass to suggest underlying malignancy  · resolved

## 2023-02-26 NOTE — PHYSICAL THERAPY NOTE
Physical Therapy Treatment Note    Patient's Name: Peyton Solorzano  : 23 1040   PT Last Visit   PT Visit Date 23   Note Type   Note Type Treatment for insurance authorization   Pain Assessment   Pain Assessment Tool 0-10   Pain Score No Pain   Restrictions/Precautions   Weight Bearing Precautions Per Order No   Other Precautions Cognitive; Chair Alarm; Bed Alarm; Impulsive; Fall Risk   General   Chart Reviewed Yes   Family/Caregiver Present Yes  (daughter present throughout session)   Cognition   Overall Cognitive Status Impaired   Arousal/Participation Alert   Attention Attends with cues to redirect   Orientation Level Oriented X4   Following Commands Follows one step commands with increased time or repetition   Comments Pt pleasant and cooperative, easily distracted, cues for attention to task   Bed Mobility   Additional Comments Pt sitting EOB upon PT arrival, requesting to use bathroom   Transfers   Sit to Stand 4  Minimal assistance   Additional items Assist x 1; Increased time required;Verbal cues   Stand to Sit 4  Minimal assistance   Additional items Assist x 1; Increased time required;Verbal cues   Additional Comments Pt impulsively attempted to stand initially  Cues for hand placement, encouraged L UE use to push from chair  Performed from various surfaces multiple trials, bed, commode, bedside chair  Ambulation/Elevation   Gait pattern Decreased foot clearance; Forward Flexion;Narrow AYLA; Short stride  (L lateral lean)   Gait Assistance 3  Moderate assist   Additional items Assist x 1   Assistive Device Rolling walker   Distance 10 ft x2 trials, 30 ft x1 trial, seated rest break between all trials  VC's for proximity to RW, forward eye gaze, L step length  Pt with tendancy for L lateral lean, more notable with fatigue/rotational turns, requires modA, otherwise Marika throughout     Balance   Static Sitting Fair   Dynamic Sitting Fair -   Static Standing Poor +   Dynamic Standing Poor   Ambulatory Poor   Activity Tolerance   Activity Tolerance Patient limited by fatigue   Nurse Made Aware RN updated  Chair alarm engaged at end of session   Exercises   [employer] Standing;10 reps; Left  (TKE with RW for support, tactile cues for quad control)   Knee AROM Long Arc Quad Sitting;10 reps;AROM; Bilateral   Marching Standing;AROM  (with RW for support, x10 L LE, x5 R LE with cues for L knee control during stance, heavy RW reliance)   Assessment   Prognosis Good   Problem List Decreased strength;Decreased endurance; Impaired balance;Decreased mobility; Decreased cognition;Decreased safety awareness   Assessment Pt able to perform multiple ambulation trials this session, impulsive at times, improves with cues for safety awareness and gait mechanics  Pt very motivated to improve and participate, daughter present during session and supportive  Pt with L lateral lean in standing most notable with fatigue and rotational turns, required modA to maintain control/balance  Pt will benefit from continued skilled PT intervention during course of hospital stay to address mobility impairments  Pt is an excellent rehab candidate, will be able to tolerate 3+ hours of skilled therapy with appropriate rest breaks  Recommend IP rehab upon hospital D/C  Goals   Patient Goals to go to rehab   STG Expiration Date 03/06/23   Plan   Treatment/Interventions Functional transfer training;LE strengthening/ROM; Elevations; Endurance training; Therapeutic exercise;Cognitive reorientation;Patient/family training;Equipment eval/education; Bed mobility;Gait training   Progress Progressing toward goals   PT Frequency 3-5x/wk   Recommendation   PT Discharge Recommendation Post acute rehabilitation services   AM-PAC Basic Mobility Inpatient   Turning in Flat Bed Without Bedrails 3   Lying on Back to Sitting on Edge of Flat Bed Without Bedrails 3   Moving Bed to Chair 3 Standing Up From Chair Using Arms 3   Walk in Room 2   Climb 3-5 Stairs With Railing 1   Basic Mobility Inpatient Raw Score 15   Basic Mobility Standardized Score 36 97   Highest Level Of Mobility   -HLM Goal 4: Move to chair/commode   -HLM Achieved 7: Walk 25 feet or more         Geeta Salazar, PT, DPT, GCS

## 2023-02-27 LAB
ANION GAP SERPL CALCULATED.3IONS-SCNC: 0 MMOL/L (ref 4–13)
BASE EX.OXY STD BLDV CALC-SCNC: 96 % (ref 60–80)
BASE EXCESS BLDV CALC-SCNC: -0.1 MMOL/L
BODY TEMPERATURE: 98 DEGREES FEHRENHEIT
BUN SERPL-MCNC: 14 MG/DL (ref 5–25)
CALCIUM SERPL-MCNC: 10.9 MG/DL (ref 8.3–10.1)
CHLORIDE SERPL-SCNC: 110 MMOL/L (ref 96–108)
CO2 SERPL-SCNC: 28 MMOL/L (ref 21–32)
CREAT SERPL-MCNC: 0.78 MG/DL (ref 0.6–1.3)
ERYTHROCYTE [DISTWIDTH] IN BLOOD BY AUTOMATED COUNT: 14.2 % (ref 11.6–15.1)
GFR SERPL CREATININE-BSD FRML MDRD: 78 ML/MIN/1.73SQ M
GLUCOSE SERPL-MCNC: 97 MG/DL (ref 65–140)
HCO3 BLDV-SCNC: 25.7 MMOL/L (ref 24–30)
HCT VFR BLD AUTO: 42.2 % (ref 34.8–46.1)
HGB BLD-MCNC: 13.9 G/DL (ref 11.5–15.4)
MCH RBC QN AUTO: 31.7 PG (ref 26.8–34.3)
MCHC RBC AUTO-ENTMCNC: 32.9 G/DL (ref 31.4–37.4)
MCV RBC AUTO: 96 FL (ref 82–98)
O2 CT BLDV-SCNC: 18.4 ML/DL
PCO2 BLDV: 46.4 MM HG (ref 42–50)
PH BLDV: 7.36 [PH] (ref 7.3–7.4)
PLATELET # BLD AUTO: 261 THOUSANDS/UL (ref 149–390)
PMV BLD AUTO: 10.9 FL (ref 8.9–12.7)
PO2 BLDV: 132.1 MM HG (ref 35–45)
POTASSIUM SERPL-SCNC: 4.6 MMOL/L (ref 3.5–5.3)
RBC # BLD AUTO: 4.39 MILLION/UL (ref 3.81–5.12)
SODIUM SERPL-SCNC: 138 MMOL/L (ref 135–147)
WBC # BLD AUTO: 7.03 THOUSAND/UL (ref 4.31–10.16)

## 2023-02-27 RX ORDER — ONDANSETRON 2 MG/ML
4 INJECTION INTRAMUSCULAR; INTRAVENOUS EVERY 6 HOURS PRN
Status: DISCONTINUED | OUTPATIENT
Start: 2023-02-27 | End: 2023-02-28 | Stop reason: HOSPADM

## 2023-02-27 RX ADMIN — GABAPENTIN 300 MG: 300 CAPSULE ORAL at 09:41

## 2023-02-27 RX ADMIN — BUPROPION HYDROCHLORIDE TABLETS 100 MG: 100 TABLET, FILM COATED ORAL at 09:43

## 2023-02-27 RX ADMIN — SENNOSIDES AND DOCUSATE SODIUM 1 TABLET: 8.6; 5 TABLET ORAL at 09:42

## 2023-02-27 RX ADMIN — APIXABAN 5 MG: 5 TABLET, FILM COATED ORAL at 09:42

## 2023-02-27 RX ADMIN — ACETAMINOPHEN 650 MG: 325 TABLET ORAL at 07:44

## 2023-02-27 RX ADMIN — SENNOSIDES AND DOCUSATE SODIUM 1 TABLET: 8.6; 5 TABLET ORAL at 17:29

## 2023-02-27 RX ADMIN — HYDROXYCHLOROQUINE SULFATE 200 MG: 200 TABLET ORAL at 09:43

## 2023-02-27 RX ADMIN — DULOXETINE HYDROCHLORIDE 60 MG: 60 CAPSULE, DELAYED RELEASE ORAL at 09:41

## 2023-02-27 RX ADMIN — ONDANSETRON 4 MG: 2 INJECTION INTRAMUSCULAR; INTRAVENOUS at 12:42

## 2023-02-27 RX ADMIN — ACETAMINOPHEN 650 MG: 325 TABLET ORAL at 17:30

## 2023-02-27 RX ADMIN — BUPROPION HYDROCHLORIDE TABLETS 100 MG: 100 TABLET, FILM COATED ORAL at 17:32

## 2023-02-27 RX ADMIN — APIXABAN 5 MG: 5 TABLET, FILM COATED ORAL at 17:29

## 2023-02-27 RX ADMIN — METOPROLOL SUCCINATE 25 MG: 25 TABLET, EXTENDED RELEASE ORAL at 17:29

## 2023-02-27 RX ADMIN — MORPHINE SULFATE 15 MG: 15 TABLET, FILM COATED, EXTENDED RELEASE ORAL at 09:41

## 2023-02-27 RX ADMIN — GABAPENTIN 300 MG: 300 CAPSULE ORAL at 17:29

## 2023-02-27 RX ADMIN — MORPHINE SULFATE 15 MG: 15 TABLET, FILM COATED, EXTENDED RELEASE ORAL at 17:29

## 2023-02-27 RX ADMIN — DULOXETINE HYDROCHLORIDE 60 MG: 60 CAPSULE, DELAYED RELEASE ORAL at 23:14

## 2023-02-27 RX ADMIN — ATORVASTATIN CALCIUM 40 MG: 40 TABLET, FILM COATED ORAL at 17:29

## 2023-02-27 NOTE — PLAN OF CARE
Problem: Nutrition/Hydration-ADULT  Goal: Nutrient/Hydration intake appropriate for improving, restoring or maintaining nutritional needs  Description: Monitor and assess patient's nutrition/hydration status for malnutrition  Collaborate with interdisciplinary team and initiate plan and interventions as ordered  Monitor patient's weight and dietary intake as ordered or per policy  Utilize nutrition screening tool and intervene as necessary  Determine patient's food preferences and provide high-protein, high-caloric foods as appropriate       INTERVENTIONS:  - Monitor oral intake, urinary output, labs, and treatment plans  - Assess nutrition and hydration status and recommend course of action  - Evaluate amount of meals eaten  - Assist patient with eating if necessary   - Allow adequate time for meals  - Recommend/ encourage appropriate diets, oral nutritional supplements, and vitamin/mineral supplements  - Order, calculate, and assess calorie counts as needed  - Recommend, monitor, and adjust tube feedings and TPN/PPN based on assessed needs  - Assess need for intravenous fluids  - Provide specific nutrition/hydration education as appropriate  - Include patient/family/caregiver in decisions related to nutrition  Outcome: Progressing     Problem: PAIN - ADULT  Goal: Verbalizes/displays adequate comfort level or baseline comfort level  Description: Interventions:  - Encourage patient to monitor pain and request assistance  - Assess pain using appropriate pain scale  - Administer analgesics based on type and severity of pain and evaluate response  - Implement non-pharmacological measures as appropriate and evaluate response  - Consider cultural and social influences on pain and pain management  - Notify physician/advanced practitioner if interventions unsuccessful or patient reports new pain  Outcome: Progressing     Problem: INFECTION - ADULT  Goal: Absence or prevention of progression during hospitalization  Description: INTERVENTIONS:  - Assess and monitor for signs and symptoms of infection  - Monitor lab/diagnostic results  - Monitor all insertion sites, i e  indwelling lines, tubes, and drains  - Monitor endotracheal if appropriate and nasal secretions for changes in amount and color  - [place] appropriate cooling/warming therapies per order  - Administer medications as ordered  - Instruct and encourage patient and family to use good hand hygiene technique  - Identify and instruct in appropriate isolation precautions for identified infection/condition  Outcome: Progressing     Problem: SAFETY ADULT  Goal: Patient will remain free of falls  Description: INTERVENTIONS:  - Educate patient/family on patient safety including physical limitations  - Instruct patient to call for assistance with activity   - Consult OT/PT to assist with strengthening/mobility   - Keep Call bell within reach  - Keep bed low and locked with side rails adjusted as appropriate  - Keep care items and personal belongings within reach  - Initiate and maintain comfort rounds  - Make Fall Risk Sign visible to staff  - Offer Toileting every 2 Hours, in advance of need  - Initiate/Maintain alarm  - Obtain necessary fall risk management equipment:   - Apply yellow socks and bracelet for high fall risk patients  - Consider moving patient to room near nurses station  Outcome: Progressing  Goal: Maintain or return to baseline ADL function  Description: INTERVENTIONS:  -  Assess patient's ability to carry out ADLs; assess patient's baseline for ADL function and identify physical deficits which impact ability to perform ADLs (bathing, care of mouth/teeth, toileting, grooming, dressing, etc )  - Assess/evaluate cause of self-care deficits   - Assess range of motion  - Assess patient's mobility; develop plan if impaired  - Assess patient's need for assistive devices and provide as appropriate  - Encourage maximum independence but intervene and supervise when necessary  - Involve family in performance of ADLs  - Assess for home care needs following discharge   - Consider OT consult to assist with ADL evaluation and planning for discharge  - Provide patient education as appropriate  Outcome: Progressing  Goal: Maintains/Returns to pre admission functional level  Description: INTERVENTIONS:  - Perform BMAT or MOVE assessment daily    - Set and communicate daily mobility goal to care team and patient/family/caregiver  - Collaborate with rehabilitation services on mobility goals if consulted  - Perform Range of Motion   Problem: DISCHARGE PLANNING  Goal: Discharge to home or other facility with appropriate resources  Description: INTERVENTIONS:  - Identify barriers to discharge w/patient and caregiver  - Arrange for needed discharge resources and transportation as appropriate  - Identify discharge learning needs (meds, wound care, etc )  - Arrange for interpretive services to assist at discharge as needed  - Refer to Case Management Department for coordinating discharge planning if the patient needs post-hospital services based on physician/advanced practitioner order or complex needs related to functional status, cognitive ability, or social support system  Outcome: Progressing     Problem: Knowledge Deficit  Goal: Patient/family/caregiver demonstrates understanding of disease process, treatment plan, medications, and discharge instructions  Description: Complete learning assessment and assess knowledge base    Interventions:  - Provide teaching at level of understanding  - Provide teaching via preferred learning methods  Outcome: Progressing     Problem: MOBILITY - ADULT  Goal: Maintain or return to baseline ADL function  Description: INTERVENTIONS:  -  Assess patient's ability to carry out ADLs; assess patient's baseline for ADL function and identify physical deficits which impact ability to perform ADLs (bathing, care of mouth/teeth, toileting, grooming, dressing, etc )  - Assess/evaluate cause of self-care deficits   - Assess range of motion  - Assess patient's mobility; develop plan if impaired  - Assess patient's need for assistive devices and provide as appropriate  - Encourage maximum independence but intervene and supervise when necessary  - Involve family in performance of ADLs  - Assess for home care needs following discharge   - Consider OT consult to assist with ADL evaluation and planning for discharge  - Provide patient education as appropriate  Outcome: Progressing  Goal: Maintains/Returns to pre admission functional level  Description: INTERVENTIONS:  - Perform BMAT or MOVE assessment daily    - Set and communicate daily mobility goal to care team and patient/family/caregiver  - Collaborate with rehabilitation services on mobility goals if consulted  - Perform Range of Motion   Problem: Prexisting or High Potential for Compromised Skin Integrity  Goal: Skin integrity is maintained or improved  Description: INTERVENTIONS:  - Identify patients at risk for skin breakdown  - Assess and monitor skin integrity  - Assess and monitor nutrition and hydration status  - Monitor labs   - Assess for incontinence   - Turn and reposition patient  - Assist with mobility/ambulation  - Relieve pressure over bony prominences  - Avoid friction and shearing  - Provide appropriate hygiene as needed including keeping skin clean and dry  - Evaluate need for skin moisturizer/barrier cream  - Collaborate with interdisciplinary team   - Patient/family teaching  - Consider wound care consult   Outcome: Progressing     - Reposition patient every 2 hours  - Dangle patient   - Stand patient   - Ambulate patient   - Out of bed to chair   - Out of bed for meals   - Out of bed for toileting  - Record patient progress and toleration of activity level   Outcome: Progressing     - Reposition patient every 2 hours    - Dangle patient   - Stand patient   - Ambulate patient  - Out of bed to chair   - Out of bed for meals   - Out of bed for toileting  - Record patient progress and toleration of activity level   Outcome: Progressing

## 2023-02-27 NOTE — PROGRESS NOTES
NEPHROLOGY PROGRESS NOTE   Paramjit Suarez 75 y o  female MRN: [id number]  Unit/Bed#: [facility] 628-01 Encounter: [id number]      ASSESSMENT/PLAN:  1  Hypercalcemia: this has been chronic/intermittent for the past few years  No labs today but yesterday calcium stable at 10 (uncorrected)  Suspect primary hyperparathyroid as PTH not suppressed   · Seen by endocrine who recommended outpatient f/u +/- sensipar if worsening   · PTH 65 4, Phos 2 4, vit D 41 7 iCa 1 33, vit D 1,25 dihydroxy  23 7, SPEP no monoclonal bands  · Pending: PTHrP  · CT C/A/P and head: No signs of malignancy  · Avoid any calcium or vitamin D products  · S/p trial NS at 50cc/h x 10 hours --avoid further fluids given her history of CHF  · Check am BMP   2  Encephalopathy: with recent CVA  Now improved  3  HFrEF with EF 25-30%   · Not on an outpatient diuretic   · Appears euvolemic to slightly dry    4  HTN: BP acceptable   · Continue metoprolol 25mg bid   5  RA: on plaquenil   6  Bipolar: no hx of lithium use per patient     Plan Summary:   • Check am BMP   • Ok to d/c from renal standpoint when cleared by primary team     SUBJECTIVE:  Reports feeling well today  Thinks she is eating/drinking well  No chest pain or shortness of breath       OBJECTIVE:  Current Weight:    Vitals:    02/27/23 0726   BP: 111/62   Pulse: 88   Resp:    Temp: (!) 96 7 °F (35 9 °C)   SpO2: 95%       Intake/Output Summary (Last 24 hours) at 2/27/2023 0900  Last data filed at 2/27/2023 0333  Gross per 24 hour   Intake 600 ml   Output 331 ml   Net 269 ml       General:  appears comfortable and in no acute distress   Skin:  No rash  Eyes:  Sclerae anicteric, no periorbital edema   ENT:  Dry  mucous membranes  Neck:  Trachea midline, symmetric   Chest:  Clear to auscultation bilaterally with no wheezes, rales or rhonchi  CVS:  Regular rate and rhythm  Abdomen:  Soft, nontender, nondistended  Neuro:  Awake and alert  Psych:  Appropriate affect  Extremities: no lower extremity edema      Medications:  Scheduled Meds:  Current Facility-Administered Medications   Medication Dose Route Frequency Provider Last Rate   • acetaminophen  650 mg Oral Q6H PRN Vinicius Pelaez PA-C     • albuterol  2 puff Inhalation Q6H PRN Sarah Staples PA-C     • aluminum-magnesium hydroxide-simethicone  30 mL Oral Q4H PRN Vinicius Pelaez PA-C     • apixaban  5 mg Oral BID Chanda Fitzpatrick DO     • atorvastatin  40 mg Oral QPM Sarah Staples PA-C     • buPROPion  100 mg Oral BID Sarah Staples PA-C     • DULoxetine  60 mg Oral BID Sarah Staples PA-C     • gabapentin  300 mg Oral BID Sarah Staples PA-C     • glycerin-hypromellose-  1 drop Both Eyes Q4H PRN Kalina Benoit DO     • hydroxychloroquine  200 mg Oral Once per day on Mon Tue Wed Thu Fri LAURA Jamison     • hydroxychloroquine  400 mg Oral Once per day on Sun Sat LAURA Jamison     • lamoTRIgine  150 mg Oral HS Sarah Staples PA-C     • metoprolol succinate  25 mg Oral BID Sarah Staples PA-C     • morphine  15 mg Oral BID Sarah Staples PA-C     • polyethylene glycol  17 g Oral Daily PRN Sarah Staples PA-C     • senna-docusate sodium  1 tablet Oral BID Sarah Staples PA-C     • vancomycin  1,000 mg Intravenous Q24H Tree Lopez MD 1,000 mg (02/26/23 2030)       PRN Meds: •  acetaminophen  •  albuterol  •  aluminum-magnesium hydroxide-simethicone  •  glycerin-hypromellose-  •  polyethylene glycol    Laboratory Results:  Results from last 7 days   Lab Units 02/26/23  1151 02/25/23  0615 02/24/23  0422 02/23/23  0452 02/22/23 2004 02/22/23  1405 02/22/23  1400   WBC Thousand/uL  --   --  7 20 7 20  --  7 80  --    HEMOGLOBIN g/dL  --   --  13 8 14 3  --  13 6  --    HEMATOCRIT %  --   --  40 9 43 8  --  41 4  --    PLATELETS Thousands/uL  --   --  240 261 248 270  --    SODIUM mmol/L 137 140 143 139  --   --  142   POTASSIUM mmol/L 3 9 3 8 3 2* 4 1  --   --  4 4   CHLORIDE mmol/L 111* 113* 113* 112*  --   --  111*   CO2 mmol/L 25 22 25 23  --   --  29   BUN mg/dL 18 12 13 13  --   --  15   CREATININE mg/dL 0 87 0 70 0 78 0 84  --   --  0 93   CALCIUM mg/dL 10 0 10 0 10 7* 11 1*  --   --  10 9*   MAGNESIUM mg/dL  --  1 8  --   --   --   --   --    PHOSPHORUS mg/dL  --   --  2 4  --   --   --   --

## 2023-02-27 NOTE — ASSESSMENT & PLAN NOTE
S/p MRI  Neurology on board  Recent hx of right MCA CVA with petechial hemorrhage w residual L sided weakness and dysarthria s/p mechanical thrombectomy  Pt w/ severe contrast allergy resulting anaphylaxis, no need to repeat CTA per neuro as was just recently done  Recent echo on 2/16/23  CVA prophylaxis: S/p repeat ct head stable; now initiated on eliquis   Cont statin

## 2023-02-27 NOTE — CASE MANAGEMENT
Case Management Discharge Planning Note    Patient name Pillo Lion  Location [address] [place] [address] [address] [id number]  : 1955 Date 2023       Current Admission Date: 2023  Current Admission Diagnosis:Acute metabolic encephalopathy   Patient Active Problem List    Diagnosis Date Noted   • Bacteremia 2023   • Acute CVA (cerebrovascular accident) ([facility] ) 2023   • Hypercalcemia 2023   • Discoloration of skin of foot 2023   • Abdominal pain 2023   • HFrEF (heart failure with reduced ejection fraction) ([facility] ) 2023   • Stroke ([facility] ) 2023   • HTN (hypertension) 2023   • Cardiomyopathy ([facility] ) 2023   • Vitamin D insufficiency 2023   • LAILA (acute kidney injury) ([facility] ) 2022   • Polypharmacy 2022   • 'light-for-dates' infant with signs of fetal malnutrition 2022   • Bipolar depression ([facility] ) 2022   • Rheumatoid arthritis ([facility] ) 2022   • Closed fracture of multiple ribs of right side 2022   • Traumatic pneumothorax 2022   • Closed fracture of transverse process of lumbar vertebra ([facility] ) 2022   • Liver contusion 2022   • Fall 2022   • Acute pain due to trauma 2022   • Right shoulder pain 2022   • Severe protein-calorie malnutrition ([address] ) 2022   • Undifferentiated connective tissue disease ([facility] ) 2021   • Primary generalized (osteo)arthritis 2021   • Peripheral neuropathy 2021   • Senile osteoporosis 2021   • Hx of compression fracture of spine 2021   • Lumbar spondylosis 2021   • Cervical spondylosis 2021   • Anemia 2021   • Chronic pain syndrome    • Asthma    • Ambulatory dysfunction    • Acute metabolic encephalopathy    • Seronegative arthropathy of multiple sites ([facility] )    • Gastroesophageal reflux disease without esophagitis    • Mixed stress and urge urinary incontinence    • CHF (congestive heart failure) ([facility] )       LOS (days): 5  Geometric Mean LOS (GMLOS) (days): 4 50  Days to GMLOS:-0 2     OBJECTIVE:  Risk of Unplanned Readmission Score: 16 89         Current admission status: Inpatient   Preferred Pharmacy:   [address] [place] [address] [address] [place] [address]  [address]  [place] [address]  Phone: [phone] Fax: [phone]    Primary Care Provider: Nick Wells DO    Primary Insurance: MEDICARE  Secondary Insurance: [facility]    DISCHARGE DETAILS: TC to daughter, Siddhartha Jimenez, made aware that patient is approved for [facility] when medically stable    Siddhartha Jimenez is in agreement

## 2023-02-27 NOTE — PROGRESS NOTES
[address] [facility]  Progress Note - Bedelia Juani 1955, 75 y o  female MRN: [id number]  Unit/Bed#: [facility] 628-01 Encounter: [id number]  Primary Care Provider: Chapis Young DO   Date and time admitted to hospital: 2/22/2023  1:16 PM    * Acute metabolic encephalopathy  Assessment & Plan  · Presents with AMS from outpatient facility recently admitted to [facility] for CVA where she underwent revascularization of a right m1 occlusion (Dr Saturnino Herbert 2/12/2023),  · Etiology likely secondary to acute cva  S/p MRI revealing: new small acute /more recent interval lacunar infarction in the right cerebral peduncle  Versus questionable seizure event  · Also possibly component of metabolic etiology given hypercalcemia   · Neurology on board  · Status post EEG  No initiation of antiepileptic medications per neurology  · MS appears much improved and back to baseline as confirmed per dtr  · S/p speech eval cleared for pureed with thin liquid    Acute CVA (cerebrovascular accident) [facility])  Assessment & Plan  S/p MRI  Neurology on board  Recent hx of right MCA CVA with petechial hemorrhage w residual L sided weakness and dysarthria s/p mechanical thrombectomy  Pt w/ severe contrast allergy resulting anaphylaxis, no need to repeat CTA per neuro as was just recently done  Recent echo on 2/16/23  CVA prophylaxis: S/p repeat ct head stable; now initiated on eliquis  Cont statin      Bacteremia  Assessment & Plan  · 1/2 micrococcus; 2nd set staph coag neg  · ID on board, considered contaminant  · Obtained records from [facility], was txt with keflex from 2/17-21 for presumably UTI   · On exam no evidence of cellulitis  Presenting b/l feet diffuse erythema resolved and no evidence of skin breakage  · Was initially empirically treated with IV vancomycin    Now discontinued  · Repeat blood cultures neg x 48 hours  · Continue to monitor off antibiotics    Discoloration of skin of foot  Assessment & Plan  · Unclear etiology, presenting with erythremia  Now resolved  Her daughter notes her skin is typically "blue"  · Low procal, afebrile, no leukocytosis thus doubt infectious process thus will monitored off abx  · S/p LE arterial duplex no evidence of significant arterial occulusive disease     Hypercalcemia  Assessment & Plan  · POA and has had intermittent elevations in the past  · Nephrology following  Non suppressed PTH thus concern for primary hyperparathyroidism  · Status post endocrinology consult, s/p SPEP no monoclonal bands, nml vit D hydroxy levels  Continue to work-up with endocrinology as outpatient  · S/p CT C/A/P no occult mass to suggest underlying malignancy  · If levels rise above 11, to consider sensipar per nephrology  · Avoid calcium supplements    HFrEF (heart failure with reduced ejection fraction) ([facility])  Assessment & Plan  Wt Readings from Last 3 Encounters:   02/16/23 62 6 kg (138 lb)   02/12/23 67 9 kg (149 lb 11 1 oz)   09/14/22 67 kg (147 lb 12 8 oz)   · Stress/stroke induced cardiomyopathy  History of cardiomyopathy in the past EF as low as 10 to 15% in 2004, more recently improved EF to 35 to 40%  · EF 25-30% on echo during prior admission   · Declined lifevest on prior admission  · Maintained on metoprolol succinate (changed from coreg prior admission), Not chronically on ace or arb  · Appears clinically compensated     · Daily weights / I and O's     HTN (hypertension)  Assessment & Plan  · On toprol xl    Rheumatoid arthritis ([facility] [address] )  Assessment & Plan  · Noted and follows up with rheum outpatient   · Continue plaquenil     Bipolar depression ([facility] [address] )  Assessment & Plan  · On buproprion, duloxetine, Lamictal   · Pending lamictal levels    Chronic pain syndrome  Assessment & Plan  · Noted history   · PDMP reviewed on [place] contin 15 mg BID   · Was also on prn oxycodone (at [facility]) but per daughter wants to to avoid additional narcotics      VTE Pharmacologic Prophylaxis:   Pharmacologic: Apixaban (Eliquis)  Mechanical VTE Prophylaxis in Place: No    Patient Centered Rounds: I have performed bedside rounds with nursing staff today  Discussions with Specialists or Other Care Team Provider: Infectious disease    Education and Discussions with Family / Patient: Patient  Also called her dtr Clarence    Time Spent for Care: 30 minutes  More than 50% of total time spent on counseling and coordination of care as described above  Current Length of Stay: 5 day(s)    Current Patient Status: Inpatient   Certification Statement: The patient will continue to require additional inpatient hospital stay due to awaiting placement to [facility]    Discharge Plan: hopefully to [facility] awaiting approval    Code Status: Level 1 - Full Code      Subjective:   Sitting in chair  Nontoxic-appearing  No acute events overnight  No change in neuro status   Objective:     Vitals:   Temp (24hrs), Av 1 °F (36 2 °C), Min:96 7 °F (35 9 °C), Max:97 6 °F (36 4 °C)    Temp:  [96 7 °F (35 9 °C)-97 6 °F (36 4 °C)] 96 9 °F (36 1 °C)  HR:  [] 99  Resp:  [16-20] 16  BP: (105-131)/(61-74) 118/70  SpO2:  [92 %-98 %] 92 %  There is no height or weight on file to calculate BMI  Input and Output Summary (last 24 hours): Intake/Output Summary (Last 24 hours) at 2023 1751  Last data filed at 2023 0901  Gross per 24 hour   Intake 600 ml   Output 331 ml   Net 269 ml       Physical Exam:     Physical Exam  Cardiovascular:      Rate and Rhythm: Normal rate and regular rhythm  Pulses: Normal pulses  Heart sounds: Normal heart sounds  No murmur heard  Pulmonary:      Effort: Pulmonary effort is normal  No respiratory distress  Breath sounds: Normal breath sounds  No wheezing or rales  Abdominal:      General: Abdomen is flat  Bowel sounds are normal  There is no distension  Palpations: Abdomen is soft  Tenderness: There is no abdominal tenderness  There is no guarding     Musculoskeletal:      Cervical back: Normal range of motion and neck supple  Right lower leg: No edema  Left lower leg: No edema  Skin:     General: Skin is warm and dry  Neurological:      Mental Status: She is alert  Mental status is at baseline  Cranial Nerves: Cranial nerve deficit present  Motor: Weakness present  Comments: Left-sided weakness  Dysarthria  Facial symmetry           Additional Data:     Labs:    Results from last 7 days   Lab Units 02/27/23  0938 02/24/23  0422 02/23/23  0452   WBC Thousand/uL 7 03   < > 7 20   HEMOGLOBIN g/dL 13 9   < > 14 3   HEMATOCRIT % 42 2   < > 43 8   PLATELETS Thousands/uL 261   < > 261   NEUTROS PCT %  --   --  75   LYMPHS PCT %  --   --  14   MONOS PCT %  --   --  10   EOS PCT %  --   --  1    < > = values in this interval not displayed  Results from last 7 days   Lab Units 02/27/23  0938 02/26/23  1151 02/25/23  0615 02/24/23  0422 02/23/23  0452   SODIUM mmol/L 138   < > 140   < > 139   POTASSIUM mmol/L 4 6   < > 3 8   < > 4 1   CHLORIDE mmol/L 110*   < > 113*   < > 112*   CO2 mmol/L 28   < > 22   < > 23   BUN mg/dL 14   < > 12   < > 13   CREATININE mg/dL 0 78   < > 0 70   < > 0 84   ANION GAP mmol/L 0*   < > 5   < > 4   CALCIUM mg/dL 10 9*   < > 10 0   < > 11 1*   ALBUMIN g/dL  --   --  2 6*  --  3 1*   TOTAL BILIRUBIN mg/dL  --   --   --   --  0 54   ALK PHOS U/L  --   --   --   --  48   ALT U/L  --   --   --   --  19   AST U/L  --   --   --   --  22   GLUCOSE RANDOM mg/dL 97   < > 98   < > 81    < > = values in this interval not displayed  Results from last 7 days   Lab Units 02/22/23  1400   INR  0 95             Results from last 7 days   Lab Units 02/22/23  1400   LACTIC ACID mmol/L 1 4   PROCALCITONIN ng/ml 0 06           * I Have Reviewed All Lab Data Listed Above  * Additional Pertinent Lab Tests Reviewed:  All Labs Within Last 24 Hours Reviewed    Imaging:    Imaging Reports Reviewed Today Include:   Imaging Personally Reviewed by Myself Includes: Recent Cultures (last 7 days):     Results from last 7 days   Lab Units 02/24/23  1525 02/24/23  1524 02/22/23  1405 02/22/23  1402   BLOOD CULTURE  No Growth at 48 hrs  No Growth at 48 hrs  Staphylococcus coagulase negative* Micrococcus luteus*   GRAM STAIN RESULT   --   --  Gram positive cocci in clusters* Gram positive cocci in clusters*       Last 24 Hours Medication List:   Current Facility-Administered Medications   Medication Dose Route Frequency Provider Last Rate   • acetaminophen  650 mg Oral Q6H PRN Klaus Riojas PA-C     • albuterol  2 puff Inhalation Q6H PRN Sarah Staples PA-C     • aluminum-magnesium hydroxide-simethicone  30 mL Oral Q4H PRN Klaus Riojas PA-C     • apixaban  5 mg Oral BID Merline DO Get     • atorvastatin  40 mg Oral QPM Sarah Staples PA-C     • buPROPion  100 mg Oral BID Sarah Staples PA-C     • DULoxetine  60 mg Oral BID Sarah Staples PA-C     • gabapentin  300 mg Oral BID Sarah Staples PA-C     • glycerin-hypromellose-  1 drop Both Eyes Q4H PRN Aleksandra Ozuna DO     • hydroxychloroquine  200 mg Oral Once per day on Mon Tue Wed Thu Fri Sheree Pink, CRNP     • hydroxychloroquine  400 mg Oral Once per day on Sun Sat Sheree Pink, CRNP     • lamoTRIgine  150 mg Oral HS Sarah Staples PA-C     • metoprolol succinate  25 mg Oral BID Sarah Staples PA-C     • morphine  15 mg Oral BID Sarah Staples PA-C     • ondansetron  4 mg Intravenous Q6H PRN Alessandra Beltran DO     • polyethylene glycol  17 g Oral Daily PRN Sarah Staples PA-C     • senna-docusate sodium  1 tablet Oral BID Lesli Moreno PA-C          Today, Patient Was Seen By: Aleksandra Ozuna DO    ** Please Note: Dictation voice to text software may have been used in the creation of this document   **

## 2023-02-27 NOTE — PROGRESS NOTES
PHYSICAL MEDICINE AND REHABILITATION   PREADMISSION ASSESSMENT     Projected [facility] Diagnoses:  Impairment of mobility, safety and Activities of Daily Living (ADLs) due to Stroke:  01 1  Left Body Involvement (Right Brain)     Etiologic: Right MCA CVA and new small acute infarction in the right cerebral peduncle    Date of Onset: 2/12/2023   Date of surgery: N/A    PATIENT INFORMATION  Name: Cele Gloria Phone #: [phone] (home)   Address: [address] [place] [id number]  YOB: 1955 Age: 75 y o  SS#   Marital Status:   Ethnicity:   Employment Status: retired  Extended Emergency [address]  Primary Emergency Contact: Jack Friedman Phone: [phone]  Relation: Daughter  Secondary Emergency Contact: Roberto Marmolejo  [employer] Phone: [phone]  Relation: Brother  Advance Directive: Level 1 - Full Code (No ACP Docs)    INSURANCE/COVERAGE:     Primary Payor: MEDICARE / Plan: MEDICARE A AND B / Product Type: Medicare A & B Fee for Service /   Secondary Payer: Self-pay   Payer Contact:  Payer Contact:   Contact Phone:  Contact Phone:       MEDICARE #: [id number]  Medicare Days: 60/30/60  Medical Record #: [id number]    REFERRAL SOURCE:   Referring provider: Dilia Shankar, [place]  Referring facility: [address] [place] [address]  Room: [address] [place] [address]-  PCP: Chapis Young DO PCP phone number: [phone]    MEDICAL INFORMATION  HPI: Cele Gloria is a 74 yo female who had a previous admission from 2/12 - 2/16 for Right MCA CVA with petechial hemorrhage, residual Left sided weakness and dysarthria s/p mechanical thrombectomy  Patient also presenting with decreased EF, with repeat Echo completed 2/16 resulting at 30%  Patient had been previously approved for [facility] but chose to go to another Acute Rehab    Patient presented to [facility] ED from other Acute Rehab on 2/22 due to altered mental status for past several days  Neurology consulted  MRI and EEG completed  MRI showed new small acute infarction in the right cerebral peduncle  EEG showed moderate, generalized encephalopathy and right hemispheric cerebral dysfunction  No electrographic seizures or interictal epileptiform discharges seen  Repeat CTH completed 2/25 - demonstrated no change  The CT demonstrated stable evolving right MCA distribution infarct, and a small recent infarct in the right cerebellar peduncle that is better visualized on MRI  Patient switched from Lovenox to Eliquis for anticoagulation and Aspirin has been discontinued  Infectious Disease consulted due to blood cultures drawn on admission which returned with gram-positive cocci in clusters  Patient treated with IV Vancomycin, repeat cultures completed show no growth  Patient noted with elevated calcium on presentation  Patient has demonstrated chronic intermittent mild hypercalcemia in the past  Nephrology and Endocrinology consulted  Endocrinology recommending outpatient follow up  Nephrology suspects hypercalcemia is due to primary hyperparathyroid as PTH not suppressed  Patient to avoid calcium or any Vitamin D products  Renal function stable  Patient's mentation has improved and she has been deemed hemodynamically stable at this time  PT/OT therapies were consulted and continue to follow patient at this time and are recommending inpatient acute rehab when medically stable  All involved medical disciplines feel/agree patient is medically ready for discharge at this time  Inpatient acute rehabilitation physician was consulted  Upon review of patient’s case and correspondence with PT/OT therapy services, [address] [place] [address] feels she will benefit and is a good candidate / appropriate for inpatient acute rehab at this time  She has demonstrated the willingness / desire and tolerance to participate in the required 3 hours or more of therapies per day       COVID Vaccination Status: Skyler Munoz 3/30/21 and 5/12/21  COVID Testing completed 2/28/23 - NEGATIVE    Past Medical History:   Past Surgical History: Allergies:     Past Medical History:   Diagnosis Date   • Anxiety    • Asthma    • Bipolar depression ([facility] )    • Chronic narcotic dependence ([facility] )    • Chronic pain    • Depression    • Enterovirus heart infection    • SHAR (obstructive sleep apnea)    • Osteoarthritis    • Peripheral neuropathy    • Plantar fasciitis of right foot    • Senile osteoporosis    • Seronegative arthropathy of multiple sites ([facility] )    • Undifferentiated connective tissue disease (HCC)     Past Surgical History:   Procedure Laterality Date   • BREAST IMPLANT     • FIXATION KYPHOPLASTY LUMBAR SPINE     • HYSTERECTOMY     • INCONTINENCE SURGERY N/A    • IR STROKE ALERT  2/12/2023   • NASAL SEPTOPLASTY W/ TURBINOPLASTY N/A    • RECTAL PROLAPSE REPAIR N/A    • REPAIR RECTOCELE     • RHINOPLASTY N/A      Allergies   Allergen Reactions   • Dye [Iodinated Contrast Media] Anaphylaxis     IVP dye   • Bactrim [Sulfamethoxazole-Trimethoprim] Hives   • Erythromycin Hives   • Penicillins          Medical/functional conditions requiring inpatient rehabilitation: Impaired balance, ambulation and mobility and ADL self-care    Risk for medical/clinical complications: Risk for Falls, uncontrolled pain, DVT/PE, skin breakdown, infection, hypo/hypertension, aspiration    Comorbidities/Surgeries in the last 100 days: Anxiety, Bipolar Depression, Chronic pain, SHAR, Peripheral Neuropathy, HFrEF - 30%, Hypercalcemia, Hypertension, Osteoporosis        CURRENT VITAL SIGNS:   Temp:  [96 9 °F (36 1 °C)-98 °F (36 7 °C)] 97 6 °F (36 4 °C)  HR:  [] 110  Resp:  [15-16] 16  BP: ()/(62-75) 117/75   Intake/Output Summary (Last 24 hours) at 2/28/2023 1304  Last data filed at 2/28/2023 0916  Gross per 24 hour   Intake 300 ml   Output --   Net 300 ml        LABORATORY RESULTS:      Lab Results   Component Value Date    HGB 13 9 02/27/2023    HCT 42 2 02/27/2023    WBC 7 03 02/27/2023     Lab Results   Component Value Date    BUN 15 02/28/2023    K 4 2 02/28/2023     (H) 02/28/2023    GLUCOSE 87 11/17/2016    CREATININE 0 83 02/28/2023     Lab Results   Component Value Date    PROTIME 12 8 02/22/2023    INR 0 95 02/22/2023        DIAGNOSTIC STUDIES:  CT chest abdomen pelvis wo contrast    Result Date: 2/23/2023  Impression: 1  Mild questionable colonic wall thickening, which may be related to underdistention versus mild colitis in the appropriate clinical setting  2   Small postprocedural hematoma measuring up to 2 cm adjacent to the right femoral vessels  3   No acute abnormality in the chest  Workstation performed: [id number]     CT head wo contrast    Result Date: 2/25/2023  Impression: No change  Stable evolving right MCA distribution infarct  Small recent infarct in the right cerebral peduncle identified on MRI is not well seen  Workstation performed: [id number]     CT head without contrast    Result Date: 2/22/2023  Impression: No acute intracranial abnormality  Workstation performed: [id number]     MRI brain wo contrast    Result Date: 2/22/2023  Impression: 1  New small acute /more recent interval lacunar infarction in the right cerebral peduncle (series 4, image 14)  2   Extensive multifocal large, previously present right middle cerebral artery infarction which is evolving with superimposed areas of evolving hemorrhage likely related to hemorrhagic transformation of subacute right MCA infarction  No significant mass effect  3   Mild, chronic microangiopathy   Workstation performed: [id number]       PRECAUTIONS/SPECIAL NEEDS:  Tobacco:   Social History     Tobacco Use   Smoking Status Former   Smokeless Tobacco Never   , Alcohol:    Social History     Substance and Sexual Activity   Alcohol Use Not Currently    Comment: quit    Anticoagulation:  Eliquis, Edema Management, Safety Concerns, Pain Management, Aspiration Risk/Precautions and Dietary Restrictions: Pureed diet with thin liquids    MEDICATIONS:     Current Facility-Administered Medications:   •  acetaminophen (TYLENOL) tablet 650 mg, 650 mg, Oral, Q6H PRN, Curt Caballero PA-C, 650 mg at 02/28/23 0324  •  albuterol (PROVENTIL HFA,VENTOLIN HFA) inhaler 2 puff, 2 puff, Inhalation, Q6H PRN, Sarah Staples PA-C  •  aluminum-magnesium hydroxide-simethicone (MYLANTA) oral suspension 30 mL, 30 mL, Oral, Q4H PRN, Curt Caballero PA-C, 30 mL at 02/25/23 2111  •  apixaban (ELIQUIS) tablet 5 mg, 5 mg, Oral, BID, Stephanie Pickett DO, 5 mg at 02/28/23 8112  •  atorvastatin (LIPITOR) tablet 40 mg, 40 mg, Oral, QPM, Sarah Staples PA-C, 40 mg at 02/27/23 1729  •  buPROPion [facility]) tablet 100 mg, 100 mg, Oral, BID, Sarah Staples PA-C, 100 mg at 02/28/23 1421  •  DULoxetine (CYMBALTA) delayed release capsule 60 mg, 60 mg, Oral, BID, Sarah Staples PA-C, 60 mg at 02/28/23 8598  •  gabapentin (NEURONTIN) capsule 300 mg, 300 mg, Oral, BID, Sarah Staples PA-C, 300 mg at 02/28/23 7811  •  glycerin-hypromellose- (ARTIFICIAL TEARS) ophthalmic solution 1 drop, 1 drop, Both Eyes, Q4H PRN, Roshan Guo DO, 1 drop at 02/25/23 1723  •  hydroxychloroquine (PLAQUENIL) tablet 200 mg, 200 mg, Oral, Once per day on Mon Tue Wed Thu Fri, LAURA Purvis, 200 mg at 02/28/23 4117  •  hydroxychloroquine (PLAQUENIL) tablet 400 mg, 400 mg, Oral, Once per day on Sun Sat, LAURA Purvis, 400 mg at 02/26/23 2132  •  lamoTRIgine (LaMICtal) tablet 150 mg, 150 mg, Oral, HS, Sarah Staples PA-C, 150 mg at 02/28/23 0005  •  metoprolol succinate (TOPROL-XL) 24 hr tablet 25 mg, 25 mg, Oral, BID, Sarah Staples PA-C, 25 mg at 02/28/23 0850  •  morphine (MS CONTIN) ER tablet 15 mg, 15 mg, Oral, BID, Sarah Staples PA-C, 15 mg at 02/28/23 0850  •  ondansetron (ZOFRAN) injection 4 mg, 4 mg, Intravenous, Q6H PRN, Roshan Guo DO, 4 mg at 02/27/23 1242  •  polyethylene glycol (MIRALAX) packet 17 g, 17 g, Oral, Daily PRN, Sarah Staples PA-C  •  senna-docusate sodium (SENOKOT S) 8 6-50 mg per tablet 1 tablet, 1 tablet, Oral, BID, Sarah Staples PA-C, 1 tablet at 02/28/23 0850    SKIN INTEGRITY:   no rashes, no erythema, no peripheral edema    PRIOR LEVEL OF FUNCTION:  She lives in a single family home  Lexx Trevino is  and lives with their family  Self Care: Independent, Indoor Mobility: Independent, Stairs (in/outdoor): Independent and Cognition: Independent    FALLS IN THE LAST 6 MONTHS:1-4    HOME ENVIRONMENT:  The living area: can live on one level  There are 0 steps to enter the home  The patient Will have 24 hour supervision/physical assistance available upon discharge  PREVIOUS DME:  Equipment in home (previous DME): Grab Bars, Rolling Walker and Single [employer]    FUNCTIONAL STATUS:  Physical Therapy Occupational Therapy Speech Therapy       As per PT:  2/28/2023       Subjective   Subjective The patient states that she can not go too fast, and that she needs to go to the bathroom  She reports fatigue and continued pain  Bed Mobility   Supine to Sit 3  Moderate assistance   Additional items Assist x 1; Increased time required;Verbal cues;LE management   Transfers   Sit to Stand 3  Moderate assistance   Additional items Assist x 1; Increased time required;Verbal cues   Stand to Sit 4  Minimal assistance   Additional items Assist x 1; Increased time required;Verbal cues   Ambulation/Elevation   Gait pattern Excessively slow; Step to;Short stride; Inconsistent anna;Decreased foot clearance; Improper Weight shift; Poor UE support   Gait Assistance 3  Moderate assist   Additional items Assist x 1;Verbal cues; Tactile cues   Assistive Device Rolling walker   Distance 10 feet, 30 feet, 20 feet x 2, 30 feet     Balance   Static Sitting Fair   Dynamic Sitting Fair -   Static Standing Poor +   Ambulatory Poor   Activity Tolerance   Activity Tolerance Patient tolerated treatment well;Patient limited by fatigue;Patient limited by pain   Nurse Made Aware Yes  Assessment   Prognosis Good   Problem List Decreased strength;Decreased endurance; Impaired balance;Decreased mobility; Decreased cognition;Decreased safety awareness   Assessment The patient was able to progress her overall activity tolerance, but she continues to require frequent seated rests  She requires instruction and at times step-by-step instruction for safe technique  The patient has notable difficulty with turns or manuevering within a confined environment  She demonstrates some unsafe technique at times which further requires instruction and intervention to ensure safety  She is motivated to make progress, but she remains limited at this time  She will benefit from continued t erapy in order to maximize her functional safety and independence  As per OT: 2/28/2023       ADL   Where Assessed Sitting at sink   Grooming Assistance 5  Supervision/Setup   Grooming Deficit Setup; Increased time to complete   UB Bathing Assistance 4  Minimal Assistance   UB Bathing Deficit Right arm;Left arm; Chest;Abdomen   LB Bathing Assistance 3  Moderate Assistance   LB Bathing Deficit Buttocks; Left upper leg;Right upper leg   UB Dressing Assistance 4  Minimal Assistance   UB Dressing Deficit Thread RUE; Thread LUE;Pull around back   LB Dressing Assistance 3  Moderate Assistance   LB Dressing Deficit Thread RLE into pants;Pull up over hips   Transfers   Sit to Stand 3  Moderate assistance   Additional items Assist x 1   Stand to Sit 4  Minimal assistance   Additional items Assist x 1   Subjective   Subjective pt stated " my hair brush is in the plastilc bag with the yellow handles" however OT could not locate her bag   Cognition   Overall Cognitive Status Impaired   Arousal/Participation Alert; Responsive; Cooperative   Attention Attends with cues to redirect   Orientation Level Oriented X4   Memory Decreased recall of precautions Following Commands Follows one step commands with increased time or repetition   Activity Tolerance   Activity Tolerance Patient tolerated treatment well   Assessment   Assessment Pt seen for participation in Occupational Therapy session with focus on activity tolerance, functional transfers/standing tolerance and balance for pt engagement in LB self-care tasks and energy conservation techniques and UB self-care  Pt cleared by RN/Lisbeth for pt participated in OT session  Pt presented sitting out of bed to bedside chair and agreeable to participate in therapy following pt identifiers confirmed  Pt required assist  UB/LB self-care 2* pt deconditioning  He was able to tolerate sitting at sink for bathing dressing and grooming tasks after OT set up and instructions  Pt will require post acute rehab service to continue to address these above noted pt deficit which currently impair pt ADL and functional mob  Pt return to sitting out of bed to bedside chair post session, chair alarm activated and all needs within reach  As per Speech: 2/27/2023    Subjective:  Pt seen for dysphagia tx  Pt was sitting upright in chair, pleasant but easily distracted       Objective:  Pt had eaten a small portion of lunch (pureed)  Observed pt feed herself a portion of her magic cup  Bolus manipulation and transfer appeared prompt  There were no overt s/s of aspiration  Pt then took sips of thin liquid via straw  With both single and consecutive sips there was frequent immediate coughing  Removed the straw, and pt took small sips via cup with min cues  There were no overt s/s of aspiration  Educated pt on recommendation to only use a cup for liquids, avoid straws  She verbalized understanding  Updated sign in room  Discussed with RN       Assessment:  Inconsistent but fairly frequent coughing with thin liquids via straw  No s/s of aspiration of thin liquids via cup sip with thin liquids   No s/s of aspiration with pureed consistency  Pt was easily distractible, and at times difficult to redirect     Plan/Recommendations:  Continue pureed diet and thin liquids  Cup sips only with liquids, NO straws  Trial dysphagia 2 and/or 3 during dysphagia tx  Pt may benefit from VBS         CARE SCORES:  Self Care:  Eatin: Setup or clean-up assistance  Oral hygiene: 04: Supervision or touching  assistance  Toilet hygiene: 03: Partial/moderate assistance  Shower/bathing self: 03: Partial/moderate assistance  Upper body dressin: Supervision or touching  assistance  Lower body dressin: Substantial/maximal assistance  Putting on/taking off footwear: 03: Partial/moderate assistance  Transfers:  Roll left and right: 03: Partial/moderate assistance  Sit to lyin: Partial/moderate assistance  Lying to sitting on side of bed: 03: Partial/moderate assistance  Sit to stand: 04: Supervision or touching  assistance  Chair/bed to chair transfer: 03: Partial/moderate assistance  Toilet transfer: 03: Partial/moderate assistance  Mobility:  Walk 10 ft: 03: Partial/moderate assistance  Walk 50 ft with two turns: 09: Not applicable  Walk 800OT: 09: Not applicable    CURRENT GAP IN FUNCTION  Prior to Admission: Functional Status: Patient was independent with mobility/ambulation, transfers, ADL's, IADL's  Expected functional outcomes: It is expected that with skilled acute rehabilitation services the patient will progress to Supervision for self care and Supervision for mobility     Estimated length of stay: 10 to 14 days    Anticipated Post-Discharge Disposition/Treatment  Disposition: Return to previous home/apartment    Outpatient Services: Physical Therapy (PT), Occupational Therapy (OT) and Speech Therapy    BARRIERS TO DISCHARGE  Weakness, Pain, Balance Difficulty, Fatigue, Home Accessibility, Caregiver Accessibility, Financial Resources, Equipment Needs and Resource Availability    INTERVENTIONS FOR DISCHARGE  Adaptive equipment, Patient/Family/Caregiver Education, [employer], Financial Assistance, Arrange DME needs, Medication Changes as per MD recommendations, Therapy exercises, Center of balance support  and Energy conservation education     REQUIRED THERAPY:  Patient will require PT, OT and ST 60 minutes each per day, five days per week to achieve rehab goals  REQUIRED FUNCTIONAL AND MEDICAL MANAGEMENT FOR INPATIENT REHABILITATION:  Pain Management: Overall pain is moderately controlled, Deep Vein Thrombosis (DVT) Prophylaxis:  Eliquis Nursing education and bowel/bladder management, internal medicine to manage/monitor medical conditions, PM&R to maximize function and provide medical oversight, PT/OT intervention, patient/family education/training, and any consults as needed  RECOMMENDED LEVEL OF CARE: Alexandru Gutiérrez is a 74 yo female who had a previous admission from 2/12 - 2/16 for Right MCA CVA with petechial hemorrhage, residual Left sided weakness and dysarthria s/p mechanical thrombectomy  Patient also presenting with decreased EF, with repeat Echo completed 2/16 resulting at 30%  Patient had been previously approved for [facility] but chose to go to another Acute Rehab  Patient presented to [facility] ED from other Acute Rehab on 2/22 due to altered mental status for past several days  Neurology consulted  MRI and EEG completed  MRI showed new small acute infarction in the right cerebral peduncle  EEG showed moderate, generalized encephalopathy and right hemispheric cerebral dysfunction  No electrographic seizures or interictal epileptiform discharges seen  Repeat CTH completed 2/25 - demonstrated no change  The CT demonstrated stable evolving right MCA distribution infarct, and a small recent infarct in the right cerebellar peduncle that is better visualized on MRI  Patient switched from Lovenox to Eliquis for anticoagulation and Aspirin has been discontinued    Infectious Disease consulted due to blood cultures drawn on admission which returned with gram-positive cocci in clusters  Patient treated with IV Vancomycin, repeat cultures completed show no growth  Patient noted with elevated calcium on presentation  Patient has demonstrated chronic intermittent mild hypercalcemia in the past  Nephrology and Endocrinology consulted  Endocrinology recommending outpatient follow up  Nephrology suspects hypercalcemia is due to primary hyperparathyroid as PTH not suppressed  Patient to avoid calcium or any Vitamin D products  Renal function stable  Patient's mentation has improved and she has been deemed hemodynamically stable at this time  Prior to admission / hospital stay patient was Independent with all ADLs / functional mobility / IADLs  Currently with PT therapies:  Currently with OT therapies: Nursing is being recommended for medication distribution / management, bowel / bladder management and educational purposes, internal medicine to continue to monitor and manage medical conditions, PM&R to maximize  function and provide medical oversight, and inpatient rehab to maximize self care, mobility, strength and endurance upon discharge to home

## 2023-02-27 NOTE — SPEECH THERAPY NOTE
Speech Language/Pathology    Speech/Language Pathology Progress Note    Patient Name: Reji Yun  GVSYY'V Date: 2/27/2023     Problem List  Principal Problem:    Acute metabolic encephalopathy  Active Problems:    Chronic pain syndrome    Bipolar depression ([facility] )    Rheumatoid arthritis ([facility] )    HTN (hypertension)    HFrEF (heart failure with reduced ejection fraction) ([facility])    Hypercalcemia    Discoloration of skin of foot    Acute CVA (cerebrovascular accident) ([facility] )    Bacteremia       Past Medical History  Past Medical History:   Diagnosis Date   • Anxiety    • Asthma    • Bipolar depression ([facility] )    • Chronic narcotic dependence ([facility] )    • Chronic pain    • Depression    • Enterovirus heart infection    • SHAR (obstructive sleep apnea)    • Osteoarthritis    • Peripheral neuropathy    • Plantar fasciitis of right foot    • Senile osteoporosis    • Seronegative arthropathy of multiple sites ([place] [address] )    • Undifferentiated connective tissue disease ([place] [address] )         Past Surgical History  Past Surgical History:   Procedure Laterality Date   • BREAST IMPLANT     • FIXATION KYPHOPLASTY LUMBAR SPINE     • HYSTERECTOMY     • INCONTINENCE SURGERY N/A    • IR STROKE ALERT  2/12/2023   • NASAL SEPTOPLASTY W/ TURBINOPLASTY N/A    • RECTAL PROLAPSE REPAIR N/A    • REPAIR RECTOCELE     • RHINOPLASTY N/A          Subjective:  Pt seen for dysphagia tx  Pt was sitting upright in chair, pleasant but easily distracted  Objective:  Pt had eaten a small portion of lunch (pureed)  Observed pt feed herself a portion of her magic cup  Bolus manipulation and transfer appeared prompt  There were no overt s/s of aspiration  Pt then took sips of thin liquid via straw  With both single and consecutive sips there was frequent immediate coughing  Removed the straw, and pt took small sips via cup with min cues  There were no overt s/s of aspiration  Educated pt on recommendation to only use a cup for liquids, avoid straws   She verbalized understanding  Updated sign in room  Discussed with RN  Assessment:  Inconsistent but fairly frequent coughing with thin liquids via straw  No s/s of aspiration of thin liquids via cup sip with thin liquids  No s/s of aspiration with pureed consistency  Pt was easily distractible, and at times difficult to redirect    Plan/Recommendations:  Continue pureed diet and thin liquids  Cup sips only with liquids, NO straws  Trial dysphagia 2 and/or 3 during dysphagia tx  Pt may benefit from VBS

## 2023-02-27 NOTE — ASSESSMENT & PLAN NOTE
· Presents with AMS from outpatient facility recently admitted to [facility] for CVA where she underwent revascularization of a right m1 occlusion (Dr Purvi Gonzalez 2/12/2023),  · Etiology likely secondary to acute cva  S/p MRI revealing: new small acute /more recent interval lacunar infarction in the right cerebral peduncle  Versus questionable seizure event  · Also possibly component of metabolic etiology given hypercalcemia   · Neurology on board  · Status post EEG    No initiation of antiepileptic medications per neurology  · MS appears much improved and back to baseline as confirmed per dtr  · S/p speech eval cleared for pureed with thin liquid

## 2023-02-27 NOTE — PLAN OF CARE
Problem: Nutrition/Hydration-ADULT  Goal: Nutrient/Hydration intake appropriate for improving, restoring or maintaining nutritional needs  Description: Monitor and assess patient's nutrition/hydration status for malnutrition  Collaborate with interdisciplinary team and initiate plan and interventions as ordered  Monitor patient's weight and dietary intake as ordered or per policy  Utilize nutrition screening tool and intervene as necessary  Determine patient's food preferences and provide high-protein, high-caloric foods as appropriate       INTERVENTIONS:  - Monitor oral intake, urinary output, labs, and treatment plans  - Assess nutrition and hydration status and recommend course of action  - Evaluate amount of meals eaten  - Assist patient with eating if necessary   - Allow adequate time for meals  - Recommend/ encourage appropriate diets, oral nutritional supplements, and vitamin/mineral supplements  - Order, calculate, and assess calorie counts as needed  - Recommend, monitor, and adjust tube feedings and TPN/PPN based on assessed needs  - Assess need for intravenous fluids  - Provide specific nutrition/hydration education as appropriate  - Include patient/family/caregiver in decisions related to nutrition  Outcome: Progressing     Problem: PAIN - ADULT  Goal: Verbalizes/displays adequate comfort level or baseline comfort level  Description: Interventions:  - Encourage patient to monitor pain and request assistance  - Assess pain using appropriate pain scale  - Administer analgesics based on type and severity of pain and evaluate response  - Implement non-pharmacological measures as appropriate and evaluate response  - Consider cultural and social influences on pain and pain management  - Notify physician/advanced practitioner if interventions unsuccessful or patient reports new pain  Outcome: Progressing     Problem: INFECTION - ADULT  Goal: Absence or prevention of progression during hospitalization  Description: INTERVENTIONS:  - Assess and monitor for signs and symptoms of infection  - Monitor lab/diagnostic results  - Monitor all insertion sites, i e  indwelling lines, tubes, and drains  - Monitor endotracheal if appropriate and nasal secretions for changes in amount and color  - [place] appropriate cooling/warming therapies per order  - Administer medications as ordered  - Instruct and encourage patient and family to use good hand hygiene technique  - Identify and instruct in appropriate isolation precautions for identified infection/condition  Outcome: Progressing     Problem: SAFETY ADULT  Goal: Patient will remain free of falls  Description: INTERVENTIONS:  - Educate patient/family on patient safety including physical limitations  - Instruct patient to call for assistance with activity   - Consult OT/PT to assist with strengthening/mobility   - Keep Call bell within reach  - Keep bed low and locked with side rails adjusted as appropriate  - Keep care items and personal belongings within reach  - Initiate and maintain comfort rounds  - Make Fall Risk Sign visible to staff  - Offer Toileting every 1 Hours, in advance of need  - Initiate/Maintain alarm  - Obtain necessary fall risk management equipment  - Apply yellow socks and bracelet for high fall risk patients  - Consider moving patient to room near nurses station  Outcome: Progressing  Goal: Maintain or return to baseline ADL function  Description: INTERVENTIONS:  -  Assess patient's ability to carry out ADLs; assess patient's baseline for ADL function and identify physical deficits which impact ability to perform ADLs (bathing, care of mouth/teeth, toileting, grooming, dressing, etc )  - Assess/evaluate cause of self-care deficits   - Assess range of motion  - Assess patient's mobility; develop plan if impaired  - Assess patient's need for assistive devices and provide as appropriate  - Encourage maximum independence but intervene and supervise when necessary  - Involve family in performance of ADLs  - Assess for home care needs following discharge   - Consider OT consult to assist with ADL evaluation and planning for discharge  - Provide patient education as appropriate  Outcome: Progressing  Goal: Maintains/Returns to pre admission functional level  Description: INTERVENTIONS:  - Perform BMAT or MOVE assessment daily    - Set and communicate daily mobility goal to care team and patient/family/caregiver  - Collaborate with rehabilitation services on mobility goals if consulted  - Perform Range of Motion 3 times a day  - Reposition patient every 2 hours  - Dangle patient 3 times a day  - Stand patient 3 times a day  - Ambulate patient 3 times a day  - Out of bed to chair 3 times a day   - Out of bed for meals 3 times a day  - Out of bed for toileting  - Record patient progress and toleration of activity level   Outcome: Progressing     Problem: DISCHARGE PLANNING  Goal: Discharge to home or other facility with appropriate resources  Description: INTERVENTIONS:  - Identify barriers to discharge w/patient and caregiver  - Arrange for needed discharge resources and transportation as appropriate  - Identify discharge learning needs (meds, wound care, etc )  - Arrange for interpretive services to assist at discharge as needed  - Refer to Case Management Department for coordinating discharge planning if the patient needs post-hospital services based on physician/advanced practitioner order or complex needs related to functional status, cognitive ability, or social support system  Outcome: Progressing     Problem: Knowledge Deficit  Goal: Patient/family/caregiver demonstrates understanding of disease process, treatment plan, medications, and discharge instructions  Description: Complete learning assessment and assess knowledge base    Interventions:  - Provide teaching at level of understanding  - Provide teaching via preferred learning methods  Outcome: Progressing     Problem: MOBILITY - ADULT  Goal: Maintain or return to baseline ADL function  Description: INTERVENTIONS:  -  Assess patient's ability to carry out ADLs; assess patient's baseline for ADL function and identify physical deficits which impact ability to perform ADLs (bathing, care of mouth/teeth, toileting, grooming, dressing, etc )  - Assess/evaluate cause of self-care deficits   - Assess range of motion  - Assess patient's mobility; develop plan if impaired  - Assess patient's need for assistive devices and provide as appropriate  - Encourage maximum independence but intervene and supervise when necessary  - Involve family in performance of ADLs  - Assess for home care needs following discharge   - Consider OT consult to assist with ADL evaluation and planning for discharge  - Provide patient education as appropriate  Outcome: Progressing  Goal: Maintains/Returns to pre admission functional level  Description: INTERVENTIONS:  - Perform BMAT or MOVE assessment daily    - Set and communicate daily mobility goal to care team and patient/family/caregiver  - Collaborate with rehabilitation services on mobility goals if consulted  - Perform Range of Motion 3 times a day  - Reposition patient every 2 hours    - Dangle patient 3 times a day  - Stand patient 3 times a day  - Ambulate patient 3 times a day  - Out of bed to chair 3 times a day   - Out of bed for meals 3 times a day  - Out of bed for toileting  - Record patient progress and toleration of activity level   Outcome: Progressing     Problem: Prexisting or High Potential for Compromised Skin Integrity  Goal: Skin integrity is maintained or improved  Description: INTERVENTIONS:  - Identify patients at risk for skin breakdown  - Assess and monitor skin integrity  - Assess and monitor nutrition and hydration status  - Monitor labs   - Assess for incontinence   - Turn and reposition patient  - Assist with mobility/ambulation  - Relieve pressure over bony prominences  - Avoid friction and shearing  - Provide appropriate hygiene as needed including keeping skin clean and dry  - Evaluate need for skin moisturizer/barrier cream  - Collaborate with interdisciplinary team   - Patient/family teaching  - Consider wound care consult   Outcome: Progressing

## 2023-02-27 NOTE — ASSESSMENT & PLAN NOTE
· 1/2 micrococcus; 2nd set staph coag neg  · ID on board, considered contaminant  · Obtained records from [facility], was txt with keflex from 2/17-21 for presumably UTI   · On exam no evidence of cellulitis  Presenting b/l feet diffuse erythema resolved and no evidence of skin breakage  · Was initially empirically treated with IV vancomycin    Now discontinued  · Repeat blood cultures neg x 48 hours  · Continue to monitor off antibiotics

## 2023-02-27 NOTE — PROGRESS NOTES
-- Patient: Jose Garrett  -- MRN: [id number]  -- Aidin Request ID: [id number]  -- Level of care reserved: Inpatient [facility]  -- Partner Reserved: [facility] - ([place]), Jeanne Millard [facility] [place] [phone]  -- Clinical needs requested:  -- Geography searched: 10 miles around [address] [place] [address]  -- Start of Service:  -- Request sent: 11:37am EST on 2/23/2023 by Ofelia Luo  -- Partner reserved: 10:25am EST on 2/27/2023 by Ofelia Luo  -- Choice list shared:

## 2023-02-27 NOTE — PROGRESS NOTES
Pastoral Care Progress Note    2023  Patient: Spencer Davenport : 1955  Admission Date & Time: 2023 1316  MRN: [id number] CSN: [id number]                     Chaplaincy Interventions Utilized:   Empowerment: Encouraged focus on present and Encouraged self-care    Exploration: Explored hope, Explored emotional needs & resources, Explored relational needs & resources, Explored spiritual needs & resources, and Facilitated story telling    Collaboration: Facilitated respect for spiritual/cultural practice during hospitalization    Relationship Building: Cultivated a relationship of care and support and Listened empathically    Ritual: Provided prayer    Chaplaincy Outcomes Achieved:  Emotional resources utilized, Expressed gratitude, Expressed peace, Identified meaningful connections, Improved communication, and Verbally processed emotions    Spiritual Coping Strategies Utilized:   Spiritual comfort, Spiritual empowerment, Positive spiritual reframing, and No spiritual coping       23 1400   Clinical Encounter Type   Visited With Patient   Routine Visit Introduction   Referral From Nurse   Referral To    Mosque Encounters   Mosque Needs Prayer

## 2023-02-27 NOTE — PROGRESS NOTES
Jose Vanegas is a 75 y o  female who is currently ordered Vancomycin IV with management by the Pharmacy Consult service  Relevant clinical data and objective / subjective history reviewed  Vancomycin Assessment:  Indication and Goal AUC/Trough: Bacteremia (goal -600, trough >10), -600, trough >10  Clinical Status: stable  Micro:     Renal Function:  SCr: 0 78 mg/dL  CrCl: 56 5 mL/min  Renal replacement: Not on dialysis  Days of Therapy: 4  Current Dose: 1000mg iv q24h  Vancomycin Plan:  New Dosing: continue 1000mg iv q24h  Estimated AUC: 525 mcg*hr/mL  Estimated Trough: 14 8 mcg/mL  Next Level: 3/3 @0600  Renal Function Monitoring: Daily BMP and UOP  Pharmacy will continue to follow closely for s/sx of nephrotoxicity, infusion reactions and appropriateness of therapy  BMP and CBC will be ordered per protocol  We will continue to follow the patient’s culture results and clinical progress daily      Renie Dancer, Pharmacist

## 2023-02-27 NOTE — ASSESSMENT & PLAN NOTE
· POA and has had intermittent elevations in the past  · Nephrology following   Non suppressed PTH thus concern for primary hyperparathyroidism  · Status post endocrinology consult, s/p SPEP no monoclonal bands, nml vit D hydroxy levels  Continue to work-up with endocrinology as outpatient  · S/p CT C/A/P no occult mass to suggest underlying malignancy  · If levels rise above 11, to consider sensipar per nephrology  · Avoid calcium supplements  · Monitoring of IV fluids given baseline low EF cardiomyopathy

## 2023-02-27 NOTE — PROGRESS NOTES
Progress Note - Infectious Disease   David Childers 75 y o  female MRN: [id number]  Unit/Bed#: [facility] 628-01 Encounter: [id number]      Impression/Recommendations:  1  Polymicrobial bacteremia, with growth of coagulase-negative Staphylococcus and micrococcus in each of 2 admission blood culture sets  Patient is a very difficult blood draw and IV access  She is clinically and systemically well  Repeat blood cultures have no growth  This is most likely contaminated blood draw  Discontinue antibiotic (vancomycin)  Follow-up on final repeat blood cultures  2   Encephalopathy, with rapid improvement  No evidence of active infection  Monitor mental status  3   Recent dysuria, resolved spontaneously  Doubt UTI clinically  Monitor for recurrent urinary symptoms  4   Recent CVA  Discussed with patient in detail regarding the above plan  Discussed with Dr Alexys Vizcaino from [facility] service  Antibiotics:  Vancomycin # 3    Subjective:  Patient's mental status is quite good  She has no respiratory complaints  Temperature stays down  No chills  She is tolerating antibiotic well  No nausea, vomiting or diarrhea  Objective:  Vitals:  Temp:  [96 7 °F (35 9 °C)-97 6 °F (36 4 °C)] 96 9 °F (36 1 °C)  HR:  [] 97  Resp:  [16-20] 16  BP: (105-131)/(61-74) 105/64  SpO2:  [92 %-98 %] 92 %  Temp (24hrs), Av 1 °F (36 2 °C), Min:96 7 °F (35 9 °C), Max:97 6 °F (36 4 °C)  Current: Temperature: (!) 96 9 °F (36 1 °C)    Physical Exam:     General: Awake, alert, cooperative, no distress  Neck:  Supple  No mass  No lymphadenopathy  Lungs: Expansion symmetric, no rales, no wheezing, respirations unlabored  Heart:  Regular rate and rhythm, S1 and S2 normal, no murmur  Abdomen: Soft, nondistended, non-tender, bowel sounds active all four quadrants, no masses, no organomegaly  Extremities: No edema  No erythema/warmth  No ulcer  Nontender to palpation  Skin:  No rash  Neuro:  Moves all extremities  Invasive Devices     Peripheral Intravenous Line  Duration           Peripheral IV 02/26/23 Dorsal (posterior); Right Forearm <1 day                Labs studies:   I have personally reviewed pertinent labs  Results from last 7 days   Lab Units 02/27/23  0938 02/26/23  1151 02/25/23  0615 02/24/23  0422 02/23/23  0452 02/22/23  1400   POTASSIUM mmol/L 4 6 3 9 3 8   < > 4 1 4 4   CHLORIDE mmol/L 110* 111* 113*   < > 112* 111*   CO2 mmol/L 28 25 22   < > 23 29   BUN mg/dL 14 18 12   < > 13 15   CREATININE mg/dL 0 78 0 87 0 70   < > 0 84 0 93   EGFR ml/min/1 73sq m 78 68 89   < > 71 63   CALCIUM mg/dL 10 9* 10 0 10 0   < > 11 1* 10 9*   AST U/L  --   --   --   --  22 26   ALT U/L  --   --   --   --  19 20   ALK PHOS U/L  --   --   --   --  48 49    < > = values in this interval not displayed  Results from last 7 days   Lab Units 02/27/23  0938 02/24/23  0422 02/23/23  0452   WBC Thousand/uL 7 03 7 20 7 20   HEMOGLOBIN g/dL 13 9 13 8 14 3   PLATELETS Thousands/uL 261 240 261     Results from last 7 days   Lab Units 02/24/23  2248 02/24/23  1525 02/24/23  1524 02/22/23  1405 02/22/23  1402   BLOOD CULTURE   --  No Growth at 48 hrs  No Growth at 48 hrs  Staphylococcus coagulase negative* Micrococcus luteus*   GRAM STAIN RESULT   --   --   --  Gram positive cocci in clusters* Gram positive cocci in clusters*   MRSA CULTURE ONLY  No Methicillin Resistant Staphlyococcus aureus (MRSA) isolated  --   --   --   --        Imaging Studies:   I have personally reviewed pertinent imaging study reports and images in PACS  EKG, Pathology, and Other Studies:   I have personally reviewed pertinent reports

## 2023-02-27 NOTE — ASSESSMENT & PLAN NOTE
Wt Readings from Last 3 Encounters:   02/16/23 62 6 kg (138 lb)   02/12/23 67 9 kg (149 lb 11 1 oz)   09/14/22 67 kg (147 lb 12 8 oz)   · Stress/stroke induced cardiomyopathy  History of cardiomyopathy in the past EF as low as 10 to 15% in 2004, more recently improved EF to 35 to 40%  · EF 25-30% on echo during prior admission   · Declined lifevest on prior admission  · Maintained on metoprolol succinate (changed from coreg prior admission), Not chronically on ace or arb  · Appears clinically compensated     · Daily weights / I and O's

## 2023-02-28 ENCOUNTER — HOSPITAL ENCOUNTER (INPATIENT)
Facility: HOSPITAL | Age: 68
LOS: 25 days | Discharge: NURSING, REHAB OR LONG-TERM CARE | End: 2023-03-25
Attending: STUDENT IN AN ORGANIZED HEALTH CARE EDUCATION/TRAINING PROGRAM | Admitting: STUDENT IN AN ORGANIZED HEALTH CARE EDUCATION/TRAINING PROGRAM

## 2023-02-28 ENCOUNTER — TELEPHONE (OUTPATIENT)
Dept: CARDIOLOGY CLINIC | Facility: CLINIC | Age: 68
End: 2023-02-28

## 2023-02-28 VITALS
SYSTOLIC BLOOD PRESSURE: 117 MMHG | RESPIRATION RATE: 16 BRPM | HEART RATE: 110 BPM | TEMPERATURE: 97.6 F | OXYGEN SATURATION: 93 % | DIASTOLIC BLOOD PRESSURE: 75 MMHG

## 2023-02-28 DIAGNOSIS — M06.9 RHEUMATOID ARTHRITIS, INVOLVING UNSPECIFIED SITE, UNSPECIFIED WHETHER RHEUMATOID FACTOR PRESENT (HCC): ICD-10-CM

## 2023-02-28 DIAGNOSIS — H04.129 DRY EYE: ICD-10-CM

## 2023-02-28 DIAGNOSIS — G89.4 CHRONIC PAIN SYNDROME: ICD-10-CM

## 2023-02-28 DIAGNOSIS — R13.19 ESOPHAGEAL DYSPHAGIA: ICD-10-CM

## 2023-02-28 DIAGNOSIS — E83.52 HYPERCALCEMIA: ICD-10-CM

## 2023-02-28 DIAGNOSIS — I50.20 HFREF (HEART FAILURE WITH REDUCED EJECTION FRACTION) (HCC): ICD-10-CM

## 2023-02-28 DIAGNOSIS — F51.5 NIGHTMARES: ICD-10-CM

## 2023-02-28 DIAGNOSIS — G47.00 INSOMNIA, UNSPECIFIED TYPE: ICD-10-CM

## 2023-02-28 DIAGNOSIS — I10 PRIMARY HYPERTENSION: ICD-10-CM

## 2023-02-28 DIAGNOSIS — J34.89 DRY NOSE: ICD-10-CM

## 2023-02-28 DIAGNOSIS — K22.4 ESOPHAGEAL DYSMOTILITY: ICD-10-CM

## 2023-02-28 DIAGNOSIS — I63.9 ACUTE CVA (CEREBROVASCULAR ACCIDENT) (HCC): Primary | ICD-10-CM

## 2023-02-28 DIAGNOSIS — F11.90 CHRONIC, CONTINUOUS USE OF OPIOIDS: ICD-10-CM

## 2023-02-28 DIAGNOSIS — M06.09 SERONEGATIVE ARTHROPATHY OF MULTIPLE SITES (HCC): ICD-10-CM

## 2023-02-28 DIAGNOSIS — F31.9 BIPOLAR DEPRESSION (HCC): ICD-10-CM

## 2023-02-28 DIAGNOSIS — F41.9 ANXIETY: ICD-10-CM

## 2023-02-28 DIAGNOSIS — D64.9 ANEMIA, UNSPECIFIED TYPE: ICD-10-CM

## 2023-02-28 DIAGNOSIS — L85.3 DRY SKIN: ICD-10-CM

## 2023-02-28 PROBLEM — R78.81 BACTEREMIA: Status: RESOLVED | Noted: 2023-02-24 | Resolved: 2023-02-28

## 2023-02-28 PROBLEM — G47.33 OBSTRUCTIVE SLEEP APNEA: Status: ACTIVE | Noted: 2023-02-28

## 2023-02-28 PROBLEM — Z86.73 HISTORY OF STROKE: Status: ACTIVE | Noted: 2023-02-28

## 2023-02-28 LAB
ALBUMIN UR ELPH-MCNC: 100 %
ALPHA1 GLOB MFR UR ELPH: 0 %
ALPHA2 GLOB MFR UR ELPH: 0 %
ANION GAP SERPL CALCULATED.3IONS-SCNC: 5 MMOL/L (ref 4–13)
B-GLOBULIN MFR UR ELPH: 0 %
BUN SERPL-MCNC: 15 MG/DL (ref 5–25)
CA-I BLD-SCNC: 1.33 MMOL/L (ref 1.12–1.32)
CALCIUM SERPL-MCNC: 10.6 MG/DL (ref 8.3–10.1)
CHLORIDE SERPL-SCNC: 111 MMOL/L (ref 96–108)
CO2 SERPL-SCNC: 26 MMOL/L (ref 21–32)
CREAT SERPL-MCNC: 0.83 MG/DL (ref 0.6–1.3)
FLUAV RNA RESP QL NAA+PROBE: NEGATIVE
FLUBV RNA RESP QL NAA+PROBE: NEGATIVE
GAMMA GLOB MFR UR ELPH: 0 %
GFR SERPL CREATININE-BSD FRML MDRD: 72 ML/MIN/1.73SQ M
GLUCOSE SERPL-MCNC: 98 MG/DL (ref 65–140)
POTASSIUM SERPL-SCNC: 4.2 MMOL/L (ref 3.5–5.3)
PROT PATTERN UR ELPH-IMP: ABNORMAL
PROT UR-MCNC: 34 MG/DL
RSV RNA RESP QL NAA+PROBE: NEGATIVE
SARS-COV-2 RNA RESP QL NAA+PROBE: NEGATIVE
SODIUM SERPL-SCNC: 142 MMOL/L (ref 135–147)

## 2023-02-28 RX ORDER — DULOXETIN HYDROCHLORIDE 60 MG/1
60 CAPSULE, DELAYED RELEASE ORAL 2 TIMES DAILY
Status: DISCONTINUED | OUTPATIENT
Start: 2023-02-28 | End: 2023-03-25 | Stop reason: HOSPADM

## 2023-02-28 RX ORDER — ACETAMINOPHEN 325 MG/1
650 TABLET ORAL EVERY 6 HOURS PRN
Status: DISCONTINUED | OUTPATIENT
Start: 2023-02-28 | End: 2023-03-25 | Stop reason: HOSPADM

## 2023-02-28 RX ORDER — MORPHINE SULFATE 15 MG/1
15 TABLET, FILM COATED, EXTENDED RELEASE ORAL 2 TIMES DAILY
Status: DISCONTINUED | OUTPATIENT
Start: 2023-02-28 | End: 2023-03-07

## 2023-02-28 RX ORDER — METOPROLOL SUCCINATE 25 MG/1
25 TABLET, EXTENDED RELEASE ORAL 2 TIMES DAILY
Status: DISCONTINUED | OUTPATIENT
Start: 2023-02-28 | End: 2023-03-01

## 2023-02-28 RX ORDER — MAGNESIUM HYDROXIDE/ALUMINUM HYDROXICE/SIMETHICONE 120; 1200; 1200 MG/30ML; MG/30ML; MG/30ML
30 SUSPENSION ORAL EVERY 4 HOURS PRN
Status: DISCONTINUED | OUTPATIENT
Start: 2023-02-28 | End: 2023-03-25 | Stop reason: HOSPADM

## 2023-02-28 RX ORDER — AMOXICILLIN 250 MG
1 CAPSULE ORAL 2 TIMES DAILY
Status: DISCONTINUED | OUTPATIENT
Start: 2023-02-28 | End: 2023-03-25 | Stop reason: HOSPADM

## 2023-02-28 RX ORDER — GABAPENTIN 300 MG/1
300 CAPSULE ORAL 2 TIMES DAILY
Status: DISCONTINUED | OUTPATIENT
Start: 2023-02-28 | End: 2023-03-02

## 2023-02-28 RX ORDER — ACETAMINOPHEN 325 MG/1
650 TABLET ORAL EVERY 6 HOURS PRN
Refills: 0 | Status: ON HOLD
Start: 2023-02-28

## 2023-02-28 RX ORDER — BUPROPION HYDROCHLORIDE 100 MG/1
100 TABLET ORAL 2 TIMES DAILY
Status: DISCONTINUED | OUTPATIENT
Start: 2023-02-28 | End: 2023-03-25 | Stop reason: HOSPADM

## 2023-02-28 RX ORDER — HYDROXYCHLOROQUINE SULFATE 200 MG/1
400 TABLET, FILM COATED ORAL 2 TIMES WEEKLY
Status: DISCONTINUED | OUTPATIENT
Start: 2023-03-04 | End: 2023-03-25 | Stop reason: HOSPADM

## 2023-02-28 RX ORDER — POLYETHYLENE GLYCOL 3350 17 G/17G
17 POWDER, FOR SOLUTION ORAL DAILY PRN
Status: DISCONTINUED | OUTPATIENT
Start: 2023-02-28 | End: 2023-03-02

## 2023-02-28 RX ORDER — ATORVASTATIN CALCIUM 40 MG/1
40 TABLET, FILM COATED ORAL EVERY EVENING
Status: DISCONTINUED | OUTPATIENT
Start: 2023-02-28 | End: 2023-03-25 | Stop reason: HOSPADM

## 2023-02-28 RX ORDER — ALBUTEROL SULFATE 90 UG/1
2 AEROSOL, METERED RESPIRATORY (INHALATION) EVERY 6 HOURS PRN
Status: DISCONTINUED | OUTPATIENT
Start: 2023-02-28 | End: 2023-03-25 | Stop reason: HOSPADM

## 2023-02-28 RX ORDER — ONDANSETRON 2 MG/ML
4 INJECTION INTRAMUSCULAR; INTRAVENOUS EVERY 6 HOURS PRN
Status: DISCONTINUED | OUTPATIENT
Start: 2023-02-28 | End: 2023-03-01

## 2023-02-28 RX ORDER — HYDROXYCHLOROQUINE SULFATE 200 MG/1
200 TABLET, FILM COATED ORAL
Status: DISCONTINUED | OUTPATIENT
Start: 2023-03-01 | End: 2023-03-25 | Stop reason: HOSPADM

## 2023-02-28 RX ADMIN — GABAPENTIN 300 MG: 300 CAPSULE ORAL at 17:19

## 2023-02-28 RX ADMIN — ALUMINUM HYDROXIDE, MAGNESIUM HYDROXIDE, AND DIMETHICONE 30 ML: 200; 20; 200 SUSPENSION ORAL at 20:03

## 2023-02-28 RX ADMIN — SENNOSIDES AND DOCUSATE SODIUM 1 TABLET: 8.6; 5 TABLET ORAL at 08:50

## 2023-02-28 RX ADMIN — LAMOTRIGINE 150 MG: 100 TABLET ORAL at 21:40

## 2023-02-28 RX ADMIN — GABAPENTIN 300 MG: 300 CAPSULE ORAL at 08:50

## 2023-02-28 RX ADMIN — BUPROPION HYDROCHLORIDE TABLETS 100 MG: 100 TABLET, FILM COATED ORAL at 17:26

## 2023-02-28 RX ADMIN — LAMOTRIGINE 150 MG: 25 TABLET ORAL at 00:05

## 2023-02-28 RX ADMIN — METOPROLOL SUCCINATE 25 MG: 25 TABLET, EXTENDED RELEASE ORAL at 08:50

## 2023-02-28 RX ADMIN — APIXABAN 5 MG: 5 TABLET, FILM COATED ORAL at 17:19

## 2023-02-28 RX ADMIN — MORPHINE SULFATE 15 MG: 15 TABLET, FILM COATED, EXTENDED RELEASE ORAL at 17:19

## 2023-02-28 RX ADMIN — APIXABAN 5 MG: 5 TABLET, FILM COATED ORAL at 08:50

## 2023-02-28 RX ADMIN — HYDROXYCHLOROQUINE SULFATE 200 MG: 200 TABLET ORAL at 08:51

## 2023-02-28 RX ADMIN — MORPHINE SULFATE 15 MG: 15 TABLET, FILM COATED, EXTENDED RELEASE ORAL at 08:50

## 2023-02-28 RX ADMIN — DULOXETINE HYDROCHLORIDE 60 MG: 60 CAPSULE, DELAYED RELEASE ORAL at 08:49

## 2023-02-28 RX ADMIN — DULOXETINE HYDROCHLORIDE 60 MG: 60 CAPSULE, DELAYED RELEASE ORAL at 20:03

## 2023-02-28 RX ADMIN — ATORVASTATIN CALCIUM 40 MG: 40 TABLET, FILM COATED ORAL at 17:19

## 2023-02-28 RX ADMIN — ACETAMINOPHEN 650 MG: 325 TABLET ORAL at 03:24

## 2023-02-28 RX ADMIN — BUPROPION HYDROCHLORIDE TABLETS 100 MG: 100 TABLET, FILM COATED ORAL at 08:52

## 2023-02-28 RX ADMIN — SENNOSIDES AND DOCUSATE SODIUM 1 TABLET: 8.6; 5 TABLET ORAL at 17:19

## 2023-02-28 NOTE — ASSESSMENT & PLAN NOTE
Unclear etiology initially of whether her altered mental status was related to stroke versus polypharmacy versus opioid overdose versus infection versus seizure  1/2 micrococcus with a second set staph coag negative and followed by infectious disease after course of antibiotic treatment  Has been off of antibiotics and repeat blood cultures negative to date, ID felt may be contaminant

## 2023-02-28 NOTE — CASE MANAGEMENT
Case Management Discharge Planning Note    Patient name Zondra Apley  Location [address] [place] [address] 628/[facility] 949-53 MRN [id number]  : 1955 Date 2023       Current Admission Date: 2023  Current Admission Diagnosis:Acute metabolic encephalopathy   Patient Active Problem List    Diagnosis Date Noted   • Bacteremia 2023   • Acute CVA (cerebrovascular accident) ([facility] ) 2023   • Hypercalcemia 2023   • Discoloration of skin of foot 2023   • Abdominal pain 2023   • HFrEF (heart failure with reduced ejection fraction) ([facility] ) 2023   • Stroke ([facility] ) 2023   • HTN (hypertension) 2023   • Cardiomyopathy ([facility] ) 2023   • Vitamin D insufficiency 2023   • LAILA (acute kidney injury) ([facility] ) 2022   • Polypharmacy 2022   • 'light-for-dates' infant with signs of fetal malnutrition 2022   • Bipolar depression ([facility] ) 2022   • Rheumatoid arthritis ([facility] ) 2022   • Closed fracture of multiple ribs of right side 2022   • Traumatic pneumothorax 2022   • Closed fracture of transverse process of lumbar vertebra ([facility] ) 2022   • Liver contusion 2022   • Fall 2022   • Acute pain due to trauma 2022   • Right shoulder pain 2022   • Severe protein-calorie malnutrition ([facility] ) 2022   • Undifferentiated connective tissue disease ([facility] ) 2021   • Primary generalized (osteo)arthritis 2021   • Peripheral neuropathy 2021   • Osteoporosis 2021   • Hx of compression fracture of spine 2021   • Lumbar spondylosis 2021   • Cervical spondylosis 2021   • Anemia 2021   • Chronic pain syndrome    • Asthma    • Ambulatory dysfunction    • Acute metabolic encephalopathy    • Seronegative arthropathy of multiple sites ([facility] )    • Gastroesophageal reflux disease without esophagitis    • Mixed stress and urge urinary incontinence    • CHF (congestive heart failure) ([facility] )       LOS (days): 6  Geometric Mean LOS (GMLOS) (days): 4 50  Days to GMLOS:-1 2     OBJECTIVE:  Risk of Unplanned Readmission Score: 17 14         Current admission status: Inpatient   Preferred Pharmacy:   [address] [place] [address] [address] [place] [address]  [address]  [facility] [address]  Phone: [phone] Fax: [phone]    Primary Care Provider: Ingrid Sawyer DO    Primary Insurance: MEDICARE  [facility]    DISCHARGE DETAILS: Daughter Gianluca Robles made aware that patient is going to room 459 today at 1430, in agreement with DCP to [facility]

## 2023-02-28 NOTE — ASSESSMENT & PLAN NOTE
History of chronic pain secondary to compression fractures in her thoracic spine and chronic rib fractures   Patient with over sleepiness at times at sedating meds decreased some recently   · Holding standing long-acting MS Contin 15mg Q12H and transitioned to MS IR 7 5mg Q6H PRN > pain still adequately controlled > monitor closely   · Gabapentin 100mg BID   · Cymbalta 60 milligrams twice daily  · Acetaminophen 650 mg every 6 hours as needed  · Was previously on oxycodone as needed at [facility] but per family due to concerns for polypharmacy and additional opioids beyond her morphine has not been on an acute care since 2/16  · Lidoderm and Bengay  Aqua K also ordered with instructions not to place over same area  · At this time she is utilizing her as needed morphine 7 5 mg 1-3 times a day on average 1-2 times a day on most days  I plan on discharging her on morphine IR 7 5 mg twice a day as needed as this is how she is accurately taking the medication  This is significantly helped with her overall cognition and mental status and a prescription had been sent as [facility] is being [facility]  She follows with Dr Maria M Horvath as her outpatient pain physician who generally writes for her opioids  He will have to continue writing for these pain medications on more of a long-term basis as she would benefit from having a single provider for her overall pain regimen

## 2023-02-28 NOTE — TELEPHONE ENCOUNTER
Daughter called concerned her [place], she is currently in the hospital, advised daughter would call her back with information  Called and left daughter a message with information, any questions can call office

## 2023-02-28 NOTE — ASSESSMENT & PLAN NOTE
3/8 saturating well on RA/2L   3/7 afternoon - patient had questionable desat with therapy but had full improved mentation and was in no resp or other distress at that time  - Comgmt with IM who also evaluated patient at this time    - She historically has had difficulty obtaining O2 from finger tips at times - when pulse ox moved to toe she had good oxygenation on RA-2L  - CXR no acute findings - bibasilar scarring   - Continue use of albuterol inhaler 2 puffs every 6 hours as needed

## 2023-02-28 NOTE — ASSESSMENT & PLAN NOTE
Wt Readings from Last 3 Encounters:   03/22/23 62 5 kg (137 lb 12 6 oz)   02/16/23 62 6 kg (138 lb)   02/12/23 67 9 kg (149 lb 11 1 oz)       Cardiomyopathy with reduced ejection fraction as low as 10 to 15% back in 2004 but has improved since and most recently was 25 to 30% on last admission  CXR 3/7 unremarkable; does not appear volume overloaded   Patient was offered LifeVest but declined  On metoprolol succinate 25 mg twice daily, with plan to add an ARB as her blood pressure would allow  Strict I's and O's and weights to be checked every Monday Wednesday Friday  Follows with Dr Luke Ray at [facility]

## 2023-02-28 NOTE — ASSESSMENT & PLAN NOTE
· POA and has had intermittent elevations in the past  · Nephrology following   Non suppressed PTH thus concern for primary hyperparathyroidism  · Status post endocrinology consult, s/p SPEP no monoclonal bands, nml vit D hydroxy levels    · S/p CT C/A/P no occult mass to suggest underlying malignancy  · If levels rise above 11, to consider sensipar per nephrology  · Avoid calcium supplements  · Will need outpatient endocrinology referral

## 2023-02-28 NOTE — ASSESSMENT & PLAN NOTE
History of an L1 compression fracture status post kyphoplasty  Was previously on Prolia has not been on since 2020  Outpatient referral to endocrine

## 2023-02-28 NOTE — ASSESSMENT & PLAN NOTE
Presents with AMS from outpatient facility recently admitted to [facility] for CVA where she underwent revascularization of a right m1 occlusion (Dr Piter Fink 2/12/2023)  · Found to have acute CVA, plan as below  Still with questionable seizure event versus related to opioids as well as some improvement with Narcan  · Overall much improved, mental status back to baseline  · S/p speech eval cleared for pureed with thin liquid  · Rest of plan as below  · PT/OT recommending rehab, case management following

## 2023-02-28 NOTE — PLAN OF CARE
Problem: PHYSICAL THERAPY ADULT  Goal: Performs mobility at highest level of function for planned discharge setting  See evaluation for individualized goals  Description: Treatment/Interventions: Functional transfer training, LE strengthening/ROM, Therapeutic exercise, Endurance training, Patient/family training, Equipment eval/education, Bed mobility, Gait training, Spoke to nursing, OT  Equipment Recommended: Toshia Tinajero       See flowsheet documentation for full assessment, interventions and recommendations  Outcome: Progressing  Note: Prognosis: Good  Problem List: Decreased strength, Decreased endurance, Impaired balance, Decreased mobility, Decreased cognition, Decreased safety awareness  Assessment: The patient was able to progress her overall activity tolerance, but she continues to require frequent seated rests  She requires instruction and at times step-by-step instruction for safe technique  The patient has notable difficulty with turns or manuevering within a confined environment  She demonstrates some unsafe technique at times which further requires instruction and intervention to ensure safety  She is motivated to make progress, but she remains limited at this time  She will benefit from continued t erapy in order to maximize her functional safety and independence  Barriers to Discharge: Inaccessible home environment, Decreased caregiver support     PT Discharge Recommendation: Post acute rehabilitation services    See flowsheet documentation for full assessment

## 2023-02-28 NOTE — ASSESSMENT & PLAN NOTE
Recent hx of right MCA CVA with petechial hemorrhage w residual L sided weakness and dysarthria s/p mechanical thrombectomy  · MRI this admission showed new small acute interval suspected lacunar infarction in the right cerebral peduncle  · Seen by neurology, now on Eliquis  Aspirin discontinued  Continue atorvastatin  · Unclear if this was etiology for presentation, possible seizure with postictal state but patient also had improvement in her symptoms with Narcan as well  No plan for antiepileptics at this time per neurology    · Outpatient follow-up with neurology in 4 weeks

## 2023-02-28 NOTE — DISCHARGE SUMMARY
Discharge Summary - [facility]    Patient Information: Corby Quinn 75 y o  female MRN: [id number]  Unit/Bed#: [facility] 628-01 Encounter: [id number]    Discharging Physician / Practitioner: LAURA Bowie  PCP: Ingrid Sawyer DO  Admission Date: 2/22/2023  Discharge Date: 02/28/23    Reason for Admission: Encephalopathy, acute stroke, positive blood cultures felt to be contaminant    Discharge Diagnoses:     Principal Problem (Resolved):    Acute metabolic encephalopathy  Active Problems:    [facility]    HFrEF (heart failure with reduced ejection fraction) ([facility] )    HTN (hypertension)    Hypercalcemia    Rheumatoid arthritis ([facility] )    Osteoporosis    Discoloration of skin of foot    Chronic pain syndrome    Bipolar depression ([facility] )  Resolved Problems:    Bacteremia      Consultations During Hospital Stay:  · Neurology  · Infectious disease  · Endocrinology   · PT/OT  · Case management    Procedures Performed:     · None     Significant Findings / Test Results:     · CTH negative   · MRI brain New small acute /more recent interval lacunar infarction in the right cerebral peduncle  Extensive multifocal large, previously present right middle cerebral artery infarction which is evolving with superimposed areas of evolving hemorrhage likely related to hemorrhagic transformation of subacute right MCA infarction  No significant mass effect  Mild, chronic microangiopathy  · CTH C/A/P Mild questionable colonic wall thickening, which may be related to underdistention versus mild colitis in the appropriate clinical setting  Small postprocedural hematoma measuring up to 2 cm adjacent to the right femoral vessels  No acute abnormality in the chest   · LE arterial duplex negative   · Repeat CTH 2/25  No change  Stable evolving right MCA distribution infarct  Small recent infarct in the right cerebral peduncle identified on MRI is not well seen  · EEG  EEG impression:  This is an abnormal routine EEG recording due to:  1  Right hemispheric irregular delta/theta activity  2  Occasional left hemispheric irregular delta activity  3  Absent PDR     Clinical Interpretation:   · This abnormal study is consistent with a moderate generalized non-specific encephalopathy and more significant right hemispheric non-specific cerebral dysfunction  No electrographic seizures or interictal epileptiform discharges (seizure tendency) were seen  No diagnostic clinical events were captured  · BC x 2 on admission, 1 grew Micrococcus luteus and other grew staph coagulase-negative  · Repeat blood cultures negative at 72 hours  Incidental Findings:   · None    Test Results Pending at Discharge (will require follow up): · Endpoints of blood culture     Outpatient Tests Requested: Outpatient follow-up with PCP  Outpatient follow-up with neurology  Outpatient follow up with endocrinology       Complications:  None     Hospital Course:     Venecia St is a 75 y o  female patient with a PMHX of CHF, HTN, hypercalcemia, RA, chronic pain syndrome with chronic opioid dependence, bipolar, osteoporsosis, stroke who originally presented to the hospital on 2/22/2023 due to encephalopathy from outpatient facility  Found to have acute CVA, also with questionable seizure event versus encephalopathy in setting of opioid use as there was some improvement with Narcan  Overall mental status much improved, back to baseline  She was seen by neurology, now on Eliquis  Aspirin was discontinued  Will remain on statin  Blood cultures on admission as part of infectious work-up grew micrococcus and staph coagulase-negative  ID was consulted, suspect contaminant  Repeat blood cultures are negative to date  Stable for discharge to acute rehab today  Should follow-up with neurology in 4 weeks  Should also follow-up with endocrinology given hypercalcemia and osteoporosis      Condition at Discharge: stable Discharge Day Visit / Exam:     * Please refer to separate progress note for these details *    Discussion with Family: Patient and daughter over the phone      Discharge instructions/Information to patient and family:   See after visit summary for information provided to patient and family  Provisions for Follow-Up Care:  See after visit summary for information related to follow-up care and any pertinent home health orders  Disposition:     Acute Rehab at [facility]    For Discharges to [facility]:   · Not Applicable to this Patient - Not Applicable to this Patient    Planned Readmission: no    Discharge Statement:  I spent 40 minutes discharging the patient  This time was spent on the day of discharge  I had direct contact with the patient on the day of discharge  Greater than 50% of the total time was spent examining patient, answering all patient questions, arranging and discussing plan of care with patient as well as directly providing post-discharge instructions  Additional time then spent on discharge activities  Discharge Medications:  See after visit summary for reconciled discharge medications provided to patient and family  ** Please Note: This note has been constructed using a voice recognition system   **

## 2023-02-28 NOTE — H&P
PHYSICAL MEDICINE AND REHABILITATION H&P/ADMISSION NOTE  Billie Pack 75 y o  female MRN: [id number]  Unit/Bed#: -01 Encounter: [id number]     Rehab Diagnosis: Impairment of mobility, safety, Activities of Daily Living (ADLs), and cognitive/communication skills due to Stroke:  01 1  Left Body Involvement (Right Brain)    History of Present Illness:   Billie Pack is a 75 y o  female with history of asthma, anxiety, bipolar depression, chronic narcotic use, previous enteroviral infection of the heart, seronegative arthropathy of multiple joints, undifferentiated connective tissue disorder, obstructive sleep apnea on home CPAP who presented to the [address] [place] [address] on 2/22 for altered mental status  According to the patient's daughter on chart review, the patient's mental status had been declining for the prior 48 hours to admission  She did receive Narcan which improved her mental status slightly  Of note she had a recent stroke with revascularization on 2/12/2023 with Dr Phylicia Orozco  She was seen by neurology with overall good strength except mildly weak in the lower extremities with brisk reflexes on the left and a positive Haroon's  At this time is felt to be potentially related to polypharmacy in the setting of chronic pain meds versus seizure in the setting of a recent stroke  She was initiated on 81 mg of aspirin daily with a plan of potentially transitioning to Eliquis, plan for EEG  CT of the head was negative  MRI was completed showing a new small acute/recent interval lacunar infarction in the right cerebral pedun there is jacinta  She was started on Eliquis  Nephrology was consulted as well for hypercalcemia  The patient was evaluated by the Rehabilitation team and deemed an appropriate candidate for comprehensive inpatient rehabilitation and admitted to the [facility] on 2/28/2023  3:41 PM patient was seen in room with brother at bedside and all questions regarding the acute inpatient rehabilitation program were answered  We also discussed at length course in the hospital and her last hospitalization to verify events and clarify any imaging or laboratory findings  Patient is eager to start her therapy program   See full ROS below  Plan:     Rehabilitation  • Functional deficits: impaired mobility, self care  • Begin PT/OT/SLP  Rehabilitation goals are to achieve a modified independent-supervision level with mobility and self care  Prognosis is good  ELOS is 14 days post painful  Estimated discharge is home  DVT prophylaxis  • On Eliquis    Pain  • Cymbalta 60 mg twice daily  • Gabapentin 300 mg twice daily  • Morphine MS Contin 15 mg twice daily  • Acetaminophen 650 mg every 6 hours as needed    Bladder plan  • Continent    Bowel plan  • Continent  • Last bowel movement 2/24    Code Status  • Level 1 full code    Acute CVA (cerebrovascular accident) [facility])  Assessment & Plan  Patient presents with altered mental status found to have acute stroke in the right cerebral peduncle  Currently with left hemiparesis and dysarthria likely compounded from prior stroke as well as new stroke  Currently on a dysphagia 1 diet with thin liquids  Review cognitive testing with OT and speech  Secondary stroke prophylaxis at this time with Eliquis 5 mg twice daily and discontinuation of aspirin  PT, OT, SLP    Consideration for neuropsychology if patient agreeable  Review modifiable risk factors and provide stroke education  Monitor for poststroke pain  Close neuro follow-up as patient has had multiple strokes in the last 2 months    History of stroke  Assessment & Plan  Patient with recent admission in early 2023 for right MCA CVA with petechial hemorrhage and residual left-sided weakness and dysarthria  Patient had mechanical thrombectomy with Dr Loco Cox on 2/12    Obstructive sleep apnea  Assessment & Plan  Continue CPAP use nightly  Setting of FiO2 to 30%    Bacteremia  Assessment & Plan  Unclear etiology initially of whether her altered mental status was related to stroke versus polypharmacy versus opioid overdose versus infection versus seizure  1/2 micrococcus with a second set staph coag negative and followed by infectious disease after course of antibiotic treatment  Has been off of antibiotics and repeat blood cultures negative to date, ID felt may be contaminant    Discoloration of skin of foot  Assessment & Plan  Per daughter and patient patient's foot will become blue or red and change colors frequently  No evidence for infectious etiology and had a lower extremity arterial duplex with no evidence of any significant arterial occlusive disease  May be related to the seronegative spondyloarthropathies    Hypercalcemia  Assessment & Plan  Intermittent elevations of calcium level  Nephrology has been following, nonsuppressed PTH with concerns for primary hyperparathyroidism  Endocrine consult also initiated status post SPEP with no monoclonal bands and normal vitamin D levels  Status post a CT of the chest abdomen and pelvis with no overt malignancy  Per nephrology consider Sensipar if calcium rises above 11 and avoid calcium supplementation  Endocrine outpatient follow-up    HTN (hypertension)  Assessment & Plan  Currently on metoprolol succinate 25 mg twice daily  Plan per cardiology did not add an ARB in the setting of heart failure however as blood pressure allows    Bipolar depression ([facility] [address] )  Assessment & Plan  Currently on Wellbutrin 100 mg twice daily, Lamictal 150 mg nightly, and Cymbalta 60 mg twice daily  Consistent with home regimen  Continue monitoring for stability, mood lability    Consider neuropsychology consultation if patient agreeable    Osteoporosis  Assessment & Plan  History of an L1 compression fracture status post kyphoplasty  Was previously on Prolia has not been on since 2020  Outpatient referral to endocrine    CHF (congestive heart failure) ([facility] [address] )  Assessment & Plan  Wt Readings from Last 3 Encounters:   02/28/23 61 2 kg (134 lb 14 7 oz)   02/16/23 62 6 kg (138 lb)   02/12/23 67 9 kg (149 lb 11 1 oz)       Cardiomyopathy with reduced ejection fraction as low as 10 to 15% back in 2004 but has improved since and most recently was 25 to 30% on last admission  Patient was offered LifeVest but declined  On metoprolol succinate 25 mg twice daily, with plan to add an ARB as her blood pressure would allow  Strict I's and O's and daily weights  Follows with Dr Stuart Adan at [facility]      Seronegative arthropathy of multiple sites [facility])  [address] with rheumatology and is on Plaquenil 200 mg Monday through Friday and 400 mg on Saturday Sunday  Continue home regimen and follow-up with rheumatology on discharge    Asthma  Assessment & Plan  Continue use of albuterol inhaler 2 puffs every 6 hours as needed    Chronic pain syndrome  Assessment & Plan  History of chronic pain secondary to compression fractures in her thoracic spine and chronic rib fractures  · MS Contin 15 mg every 12 hours  · Gabapentin 300 mg twice daily  · Cymbalta 60 milligrams twice daily  · Acetaminophen 650 mg every 6 hours as needed  · [facility] but per family due to concerns for polypharmacy and additional opioids beyond her morphine has not been on an acute care since 2/16            Subjective/Interval Events:   Review of Systems   Constitutional: Negative for chills and fever  HENT: Positive for trouble swallowing  Negative for hearing loss  Eyes: Negative for photophobia and visual disturbance  Respiratory: Negative for cough and shortness of breath  Cardiovascular: Negative for chest pain and leg swelling  Gastrointestinal: Negative for constipation, diarrhea, nausea and vomiting  Endocrine: Negative for cold intolerance and heat intolerance  Musculoskeletal: Positive for arthralgias, back pain and neck pain     Skin: "Negative for rash and wound  Allergic/Immunologic: Negative for environmental allergies and food allergies  Neurological: Positive for weakness  Negative for dizziness, numbness and headaches  Hematological: Negative for adenopathy  Does not bruise/bleed easily  Psychiatric/Behavioral: Negative for dysphoric mood and sleep disturbance  Function:  PRIOR LEVEL OF FUNCTION:  She lives in a single family home  Benny Michael is  and lives with their family  Self Care: Independent, Indoor Mobility: Independent, Stairs (in/outdoor): Independent and Cognition: Independent     HOME ENVIRONMENT:  The living area: can live on one level  There are 0 steps to enter the home  The patient Will have 24 hour supervision/physical assistance available upon discharge  Current level of function:  Physical Therapy: Bed mobility moderate assist, transfers moderate assist ambulation moderate assist using rolling walker for 10 feet +30 feet +20 feet x 2  Occupational Therapy: Upper body ADLs min assist, lower body ADLs moderate assist, grooming supervision  Speech Therapy: Dysphagia 1 and thins    Physical Exam:  /64 (BP Location: Right arm)   Pulse 96   Temp 97 8 °F (36 6 °C) (Oral)   Resp 16   Ht 5' 3\" (1 6 m)   Wt 61 2 kg (134 lb 14 7 oz)   SpO2 94%   BMI 23 90 kg/m²      No intake or output data in the 24 hours ending 02/28/23 1742    Body mass index is 23 9 kg/m²  Physical Exam  Vitals and nursing note reviewed  Constitutional:       General: She is not in acute distress  HENT:      Head: Atraumatic  Comments: Left facial droop     Right Ear: External ear normal       Left Ear: External ear normal       Nose: Nose normal  No rhinorrhea  Mouth/Throat:      Mouth: Mucous membranes are moist       Pharynx: Oropharynx is clear  Eyes:      General: No scleral icterus  Cardiovascular:      Rate and Rhythm: Normal rate  Pulses: Normal pulses        Heart sounds: Normal " heart sounds  Pulmonary:      Effort: Pulmonary effort is normal  No respiratory distress  Breath sounds: Normal breath sounds  Abdominal:      General: There is no distension  Palpations: Abdomen is soft  Musculoskeletal:      Cervical back: Normal range of motion  Right lower leg: No edema  Left lower leg: No edema  Skin:     General: Skin is warm and dry  Neurological:      Mental Status: She is alert and oriented to person, place, and time  Comments: Left hemiparesis and dysmetria on finger-nose testing  Dysarthria  Strength on the left hemibody is predominantly 4/5 with a good amount of resistance in the proximal left upper extremity and closer to 4 -/5 in the distal left upper extremity predominantly at the wrist   Some of this was pain limited  Left lower extremity 4/5 throughout with good resistance except for 4-/5 in the ankle dorsiflexor  Psychiatric:         Mood and Affect: Mood normal          Behavior: Behavior normal         Labs, medications, and imaging personally reviewed      Laboratory:    Lab Results   Component Value Date    SODIUM 142 02/28/2023    K 4 2 02/28/2023     (H) 02/28/2023    CO2 26 02/28/2023    BUN 15 02/28/2023    CREATININE 0 83 02/28/2023    GLUC 98 02/28/2023    CALCIUM 10 6 (H) 02/28/2023     Lab Results   Component Value Date    WBC 7 03 02/27/2023    HGB 13 9 02/27/2023    HCT 42 2 02/27/2023    MCV 96 02/27/2023     02/27/2023     Lab Results   Component Value Date    INR 0 95 02/22/2023    INR 0 90 02/12/2023    INR 1 12 04/22/2022    PROTIME 12 8 02/22/2023    PROTIME 12 1 02/12/2023    PROTIME 14 0 04/22/2022         Current Facility-Administered Medications:   •  acetaminophen (TYLENOL) tablet 650 mg, 650 mg, Oral, Q6H PRN, Fran Moose, DO  •  albuterol (PROVENTIL HFA,VENTOLIN HFA) inhaler 2 puff, 2 puff, Inhalation, Q6H PRN, Fran Hanksose, DO  •  aluminum-magnesium hydroxide-simethicone (MYLANTA) oral suspension 30 mL, 30 mL, Oral, Q4H PRN, Ning Reagin, DO  •  apixaban (ELIQUIS) tablet 5 mg, 5 mg, Oral, BID, Ning Reagin, DO, 5 mg at 02/28/23 1719  •  atorvastatin (LIPITOR) tablet 40 mg, 40 mg, Oral, QPM, Ning Reagin, DO, 40 mg at 02/28/23 1719  •  buPROPion [facility]) tablet 100 mg, 100 mg, Oral, BID, Ning Reagin, DO, 100 mg at 02/28/23 1726  •  DULoxetine (CYMBALTA) delayed release capsule 60 mg, 60 mg, Oral, BID, Ning Reagin, DO  •  gabapentin (NEURONTIN) capsule 300 mg, 300 mg, Oral, BID, Ning Reagin, DO, 300 mg at 02/28/23 1719  •  glycerin-hypromellose- (ARTIFICIAL TEARS) ophthalmic solution 1 drop, 1 drop, Both Eyes, Q4H PRN, Ning Reagin, DO  •  [START ON 3/1/2023] hydroxychloroquine (PLAQUENIL) tablet 200 mg, 200 mg, Oral, Once per day on Mon Tue Wed Thu Fri, Carloz DELEON Merryl Fiddler, DO  •  [START ON 3/4/2023] hydroxychloroquine (PLAQUENIL) tablet 400 mg, 400 mg, Oral, Once per day on Sun Sat, Carloz Parnell, DO  •  lamoTRIgine (LaMICtal) tablet 150 mg, 150 mg, Oral, HS, Ning Reagin, DO  •  metoprolol succinate (TOPROL-XL) 24 hr tablet 25 mg, 25 mg, Oral, BID, Ning Reagin, DO  •  morphine (MS CONTIN) ER tablet 15 mg, 15 mg, Oral, BID, Ning Reagin, DO, 15 mg at 02/28/23 1719  •  ondansetron (ZOFRAN) injection 4 mg, 4 mg, Intravenous, Q6H PRN, Ning Reagin, DO  •  polyethylene glycol (MIRALAX) packet 17 g, 17 g, Oral, Daily PRN, Ning Reagin, DO  •  senna-docusate sodium (SENOKOT S) 8 6-50 mg per tablet 1 tablet, 1 tablet, Oral, BID, Ning Espinoza, DO, 1 tablet at 02/28/23 1719  Allergies   Allergen Reactions   • Dye [Iodinated Contrast Media] Anaphylaxis     IVP dye   • Bactrim [Sulfamethoxazole-Trimethoprim] Hives   • Erythromycin Hives   • Penicillins       Patient Active Problem List    Diagnosis Date Noted   • Acute CVA (cerebrovascular accident) ([facility] [address] ) 02/23/2023   • Obstructive sleep apnea 02/28/2023   • History of stroke 02/28/2023   • Bacteremia 02/24/2023   • Hypercalcemia 02/22/2023   • Discoloration of skin of foot 02/22/2023   • Abdominal pain 02/22/2023   • HFrEF (heart failure with reduced ejection fraction) ([facility] ) 02/13/2023   • Stroke ([facility] ) 02/12/2023   • HTN (hypertension) 02/12/2023   • Cardiomyopathy ([facility] ) 02/07/2023   • Vitamin D insufficiency 02/07/2023   • LAILA (acute kidney injury) ([place] [address] ) 04/23/2022   • Polypharmacy 04/23/2022   • 'light-for-dates' infant with signs of fetal malnutrition 04/22/2022   • Bipolar depression ([facility] ) 03/04/2022   • Rheumatoid arthritis ([place] [address] ) 03/04/2022   • Closed fracture of multiple ribs of right side 03/02/2022   • Traumatic pneumothorax 03/02/2022   • Closed fracture of transverse process of lumbar vertebra ([place] [address] ) 03/02/2022   • Liver contusion 03/02/2022   • Fall 03/02/2022   • Acute pain due to trauma 03/02/2022   • Right shoulder pain 03/02/2022   • Severe protein-calorie malnutrition ([facility] ) 03/02/2022   • Undifferentiated connective tissue disease ([place] [address] ) 12/29/2021   • Primary generalized (osteo)arthritis 12/29/2021   • Peripheral neuropathy 12/29/2021   • Osteoporosis 12/29/2021   • Hx of compression fracture of spine 12/29/2021   • Lumbar spondylosis 12/29/2021   • Cervical spondylosis 12/29/2021   • Anemia 06/26/2021   • Chronic pain syndrome    • Asthma    • Ambulatory dysfunction    • Seronegative arthropathy of multiple sites ([place] [address] )    • Gastroesophageal reflux disease without esophagitis    • Mixed stress and urge urinary incontinence    • CHF (congestive heart failure) ([place] [address] )      Past Medical History:   Diagnosis Date   • Anxiety    • Asthma    • Bipolar depression ([facility] )    • Chronic narcotic dependence ([place] [address] )    • Chronic pain    • Depression    • Enterovirus heart infection    • SHAR (obstructive sleep apnea)    • Osteoarthritis    • Peripheral neuropathy    • Plantar fasciitis of right foot    • Senile osteoporosis    • Seronegative arthropathy of multiple sites [facility])    • Undifferentiated connective tissue disease ([facility] [address] )      Past Surgical History: Procedure Laterality Date   • BREAST IMPLANT     • FIXATION KYPHOPLASTY LUMBAR SPINE     • HYSTERECTOMY     • INCONTINENCE SURGERY N/A    • IR STROKE ALERT  2/12/2023   • NASAL SEPTOPLASTY W/ TURBINOPLASTY N/A    • RECTAL PROLAPSE REPAIR N/A    • REPAIR RECTOCELE     • RHINOPLASTY N/A      Social History     Socioeconomic History   • Marital status:      Spouse name: Not on file   • Number of children: Not on file   • Years of education: Not on file   • Highest education level: Not on file   Occupational History   • Not on file   Tobacco Use   • Smoking status: Former   • Smokeless tobacco: Never   Vaping Use   • Vaping Use: Never used   Substance and Sexual Activity   • Alcohol use: Not Currently     Comment: quit   • Drug use: Yes     Types: Marijuana     Comment: has medical card   • Sexual activity: Not on file   Other Topics Concern   • Not on file   Social History Narrative   • Not on file     Social Determinants of Health     Financial Resource Strain: Not on file   Food Insecurity: No Food Insecurity   • Worried About Running Out of Food in the Last Year: Never true   • Ran Out of Food in the Last Year: Never true   Transportation Needs: No Transportation Needs   • Lack of Transportation (Medical): No   • Lack of Transportation (Non-Medical):  No   Physical Activity: Not on file   Stress: Not on file   Social Connections: Not on file   Intimate Partner Violence: Not on file   Housing Stability: Low Risk    • Unable to Pay for Housing in the Last Year: No   • Number of Places Lived in the Last Year: 1   • Unstable Housing in the Last Year: No     Social History     Tobacco Use   Smoking Status Former   Smokeless Tobacco Never     Social History     Substance and Sexual Activity   Alcohol Use Not Currently    Comment: quit     Family History   Problem Relation Age of Onset   • No Known Problems Mother    • No Known Problems Father    • Arthritis Family    • Diabetes Family    • Stroke Family Medical Necessity Criteria for [facility] Admission: Electrolyte imbalance:  hypercalcemia, Hypertension, Bowel/Bladder Management and Delirium/Agitation/Encephalopathy  In addition, the preadmission screen, post-admission physical evaluation, overall plan of care and admissions order demonstrate a reasonable expectation that the following criteria were met at the time of admission to the [facility]  1  The patient requires active and ongoing therapeutic intervention of multiple therapy disciplines (physical therapy, occupational therapy, speech-language pathology, or prosthetics/orthotics), one of which is physical or occupational therapy  2  Patient requires an intensive rehabilitation therapy program, as defined in Chapter 1, section 110 2 2 of the CMS Medicare Policy Manual  This intensive rehabilitation therapy program will consist of at least 3 hours of therapy per day at least 5 days per week or at least 15 hours of intensive rehabilitation therapy within a 7 consecutive day period, beginning with the date of admission to the [facility]  3  The patient is reasonably expected to actively participate in, and benefit significantly from, the intensive rehabilitation therapy program as defined in Chapter 1, section 110 2 2 of the CMS Medicare Policy Manual at this time of admission to the [facility]  She can reasonably be expected to make measurable improvement (that will be of practical value to improve the patient’s functional capacity or adaptation to impairments) as a result of the rehabilitation treatment, as defined in section 110 3, and such improvement can be expected to be made within the prescribed period of time  As noted in the CMS Medicare Policy Manual, the patient need not be expected to achieve complete independence in the domain of self-care nor be expected to return to his or her prior level of functioning in order to meet this standard    4  The patient must require physician supervision by a rehabilitation physician  As such, a rehabilitation physician will conduct face-to-face visits with the patient at least 3 days per week throughout the patient’s stay in the [facility] to assess the patient both medically and functionally, as well as to modify the course of treatment as needed to maximize the patient’s capacity to benefit from the rehabilitation process  5  The patient requires an intensive and coordinated interdisciplinary approach to providing rehabilitation, as defined in Chapter 1, section 110 2 5 of the CMS Medicare Policy Manual  This will be achieved through periodic team conferences, conducted at least once in a 7-day period, and comprising of an interdisciplinary team of medical professionals consisting of: a rehabilitation physician, registered nurse,  and/or , and a licensed/certified therapist from each therapy discipline involved in treating the patient  Changes Since Pre-admission Assessment: None -This patient's participation in rehab continues to be reasonable, necessary and appropriate  CMS Required Post-Admission Physician Evaluation Elements  History and Physical, including medical history, functional history and active comorbidities as in above text  Post-Admission Physician Evaluation:  The patient has the potential to make improvement and is in need of physical, occupational, and/or therapy services  The patient may also need nutritional services  Given the patient's complex medical condition and risk of further medical complications, rehabilitative services cannot be safely provided at a lower level of care, such as a skilled nursing facility  I have reviewed the patient's functional and medical status at the time of the preadmission screening and they are the same as on the day of this admission  I acknowledge that I have personally performed a full physical examination on this patient within 24 hours of admission   The patient and/or family demonstrated understanding the rehabilitation program and the discharge process after we discussed them  Agree in entirety: yes  Minor adaptions: none    Major changes: none    Last Guerrero,   Physical Medicine and Priyanka Rice    I have spent a total time of 90 minutes on 02/28/23 in caring for this patient including Diagnostic results, Risks and benefits of tx options, Instructions for management, Patient and family education, Importance of tx compliance, Counseling / Coordination of care, Documenting in the medical record, Reviewing / ordering tests, medicine, procedures  , Obtaining or reviewing history   and Communicating with other healthcare professionals   Additional time spent on family education and discussion of the admitting patient with the consultants teams    This includes reconsultation of the nephrology team was been following for hypercalcemia and the internal medicine team

## 2023-02-28 NOTE — ASSESSMENT & PLAN NOTE
Currently on Wellbutrin 100 mg twice daily, Lamictal 150 mg nightly, and Cymbalta 60 mg twice daily  Consistent with home regimen  Continue monitoring for stability, mood lability    Consider neuropsychology consultation if patient agreeable

## 2023-02-28 NOTE — PROGRESS NOTES
Progress Note - Infectious Disease   Bedrahul Gloria 75 y o  female MRN: [id number]  Unit/Bed#: [facility] 628-01 Encounter: [id number]      Impression/Recommendations:  1  Polymicrobial bacteremia, with growth of coagulase-negative Staphylococcus and micrococcus in each of 2 admission blood culture sets  Patient is a very difficult blood draw and IV access  She is clinically and systemically well  Repeat blood cultures have no growth  This is most likely contaminated blood draw  Patient had been on antibiotic earlier in hospitalization but this was discontinued  She remains clinically well off antibiotic  Observe off further antibiotic  Follow-up on final repeat blood cultures      2   Encephalopathy, with rapid improvement  No evidence of active infection  Monitor mental status      3   Recent dysuria, resolved spontaneously  Doubt UTI clinically  Monitor for recurrent urinary symptoms      4   Recent CVA      Discussed with patient in detail regarding the above plan  Discussed with slim service earlier      Antibiotics:  Off antibiotic     Subjective:  Patient is awake and alert  She has no respiratory complaints  Temperature stays down  No chills  She is tolerating antibiotic well  No nausea, vomiting or diarrhea      Objective:  Vitals:  Temp:  [96 9 °F (36 1 °C)-98 °F (36 7 °C)] 97 6 °F (36 4 °C)  HR:  [] 110  Resp:  [15-16] 16  BP: ()/(62-75) 117/75  SpO2:  [89 %-96 %] 93 %  Temp (24hrs), Av 6 °F (36 4 °C), Min:96 9 °F (36 1 °C), Max:98 °F (36 7 °C)  Current: Temperature: 97 6 °F (36 4 °C)    Physical Exam:     General: Awake, alert, cooperative, no distress  Neck:  Supple  No mass  No lymphadenopathy  Lungs: Expansion symmetric, no rales, no wheezing, respirations unlabored  Heart:  Regular rate and rhythm, S1 and S2 normal, no murmur  Abdomen: Soft, nondistended, non-tender, bowel sounds active all four quadrants, no masses, no organomegaly     Extremities: Stable mild leg edema  No erythema/warmth  No draining ulcer  Nontender to palpation  Skin:  No rash  Neuro: Moves all extremities  Invasive Devices     Peripheral Intravenous Line  Duration           Peripheral IV 02/26/23 Dorsal (posterior); Right Forearm 1 day                Labs studies:   I have personally reviewed pertinent labs  Results from last 7 days   Lab Units 02/28/23  0519 02/27/23  3992 02/26/23  1151 02/24/23  0422 02/23/23  0452 02/22/23  1400   POTASSIUM mmol/L 4 2 4 6 3 9   < > 4 1 4 4   CHLORIDE mmol/L 111* 110* 111*   < > 112* 111*   CO2 mmol/L 26 28 25   < > 23 29   BUN mg/dL 15 14 18   < > 13 15   CREATININE mg/dL 0 83 0 78 0 87   < > 0 84 0 93   EGFR ml/min/1 73sq m 72 78 68   < > 71 63   CALCIUM mg/dL 10 6* 10 9* 10 0   < > 11 1* 10 9*   AST U/L  --   --   --   --  22 26   ALT U/L  --   --   --   --  19 20   ALK PHOS U/L  --   --   --   --  48 49    < > = values in this interval not displayed  Results from last 7 days   Lab Units 02/27/23  0938 02/24/23  0422 02/23/23  0452   WBC Thousand/uL 7 03 7 20 7 20   HEMOGLOBIN g/dL 13 9 13 8 14 3   PLATELETS Thousands/uL 261 240 261     Results from last 7 days   Lab Units 02/24/23  2248 02/24/23  1525 02/24/23  1524 02/22/23  1405 02/22/23  1402   BLOOD CULTURE   --  No Growth at 72 hrs  No Growth at 72 hrs  Staphylococcus coagulase negative* Micrococcus luteus*   GRAM STAIN RESULT   --   --   --  Gram positive cocci in clusters* Gram positive cocci in clusters*   MRSA CULTURE ONLY  No Methicillin Resistant Staphlyococcus aureus (MRSA) isolated  --   --   --   --        Imaging Studies:   I have personally reviewed pertinent imaging study reports and images in PACS  EKG, Pathology, and Other Studies:   I have personally reviewed pertinent reports

## 2023-02-28 NOTE — PHYSICAL THERAPY NOTE
Physical Therapy Progress Note     02/28/23 1155   PT Last Visit   PT Visit Date 02/28/23   Note Type   Note Type Treatment for insurance authorization   Pain Assessment   Pain Assessment Tool 0-10   Pain Score 4   Pain Location/Orientation Location: Head;Location: Generalized   Hospital Pain Intervention(s) Repositioned; Ambulation/increased activity; Emotional support   Restrictions/Precautions   Other Precautions Cognitive; Chair Alarm; Bed Alarm; Fall Risk;Pain   Subjective   Subjective The patient states that she can not go too fast, and that she needs to go to the bathroom  She reports fatigue and continued pain  Bed Mobility   Supine to Sit 3  Moderate assistance   Additional items Assist x 1; Increased time required;Verbal cues;LE management   Transfers   Sit to Stand 3  Moderate assistance   Additional items Assist x 1; Increased time required;Verbal cues   Stand to Sit 4  Minimal assistance   Additional items Assist x 1; Increased time required;Verbal cues   Ambulation/Elevation   Gait pattern Excessively slow; Step to;Short stride; Inconsistent anna;Decreased foot clearance; Improper Weight shift; Poor UE support   Gait Assistance 3  Moderate assist   Additional items Assist x 1;Verbal cues; Tactile cues   Assistive Device Rolling walker   Distance 10 feet, 30 feet, 20 feet x 2, 30 feet  Balance   Static Sitting Fair   Dynamic Sitting Fair -   Static Standing Poor +   Ambulatory Poor   Activity Tolerance   Activity Tolerance Patient tolerated treatment well;Patient limited by fatigue;Patient limited by pain   Nurse Made Aware Yes  Assessment   Prognosis Good   Problem List Decreased strength;Decreased endurance; Impaired balance;Decreased mobility; Decreased cognition;Decreased safety awareness   Assessment The patient was able to progress her overall activity tolerance, but she continues to require frequent seated rests  She requires instruction and at times step-by-step instruction for safe technique   The patient has notable difficulty with turns or manuevering within a confined environment  She demonstrates some unsafe technique at times which further requires instruction and intervention to ensure safety  She is motivated to make progress, but she remains limited at this time  She will benefit from continued t erapy in order to maximize her functional safety and independence  Barriers to Discharge Inaccessible home environment;Decreased caregiver support   Goals   Patient Goals To get better  STG Expiration Date 03/06/23   PT Treatment Day 1   Plan   Treatment/Interventions LE strengthening/ROM; Functional transfer training; Therapeutic exercise; Endurance training;Patient/family training;Bed mobility;Gait training;Cognitive reorientation   Progress Progressing toward goals   PT Frequency 3-5x/wk   Recommendation   PT Discharge Recommendation Post acute rehabilitation services   Equipment Recommended [facility] Recommended Wheeled walker   AM-PAC Basic Mobility Inpatient   Turning in Flat Bed Without Bedrails 3   Lying on Back to Sitting on Edge of Flat Bed Without Bedrails 2   Moving Bed to Chair 3   Standing Up From Chair Using Arms 3   Walk in Room 2   Climb 3-5 Stairs With Railing 1   Basic Mobility Inpatient Raw Score 14   Basic Mobility Standardized Score 35 55   Highest Level Of Mobility   -HL Goal 4: Move to chair/commode   JH-HLM Achieved 7: Walk 25 feet or more         An AM-PAC Basic Mobility raw score less than 16 suggests the patient may benefit from discharge to post-acute rehab services      Wilmar Jones PTA

## 2023-02-28 NOTE — ASSESSMENT & PLAN NOTE
3/8 - neuro exam stable - continue to monitor closely   CT head 3/7 - Evolving right MCA territory infarcts with associated petechial hemorrhage which is best seen on the recent MRI examination  There is no discrete parenchymal hematoma  - discussed above with radiologist Della - no acute bleeding noted   MRI brain 2/22 - 1  New small acute /more recent interval lacunar infarction in the right cerebral peduncle (series 4, image 14)  2   Extensive multifocal large, previously present right middle cerebral artery infarction which is evolving with superimposed areas of evolving hemorrhage likely related to hemorrhagic transformation of subacute right MCA infarction  No significant   mass effect  3   Mild, chronic microangiopathy  S/P thrombectomy 2/12 by Dr Etienne Arreaga   Currently on a dysphagia 1 diet with thin liquids  Secondary stroke prophylaxis at this time with Eliquis 5 mg twice daily (aspirin d/c'd earlier); and statin; optimal BP control   PT, OT, SLP     Review modifiable risk factors and provide stroke education  Monitor for poststroke pain  Eliquis for secondary stroke prophylaxis  Close neuro follow-up as patient has had multiple strokes in the last 2 months

## 2023-02-28 NOTE — ASSESSMENT & PLAN NOTE
N/V intact - no significant discoloration today   Per daughter and patient patient's foot will become blue or red and change colors frequently  No evidence for infectious etiology and had a lower extremity arterial duplex with no evidence of any significant arterial occlusive disease  May be related to the seronegative spondyloarthropathies  Likely the source of of the inaccurate and inconsistent pulse oximetry readings in the hands as well  Continue local wound care with SilvaSorb for the left lateral foot and Eucerin cream for the remaining dry cracked skin throughout the feet and hands which has been a chronic issue

## 2023-02-28 NOTE — PLAN OF CARE
Problem: PAIN - ADULT  Goal: Verbalizes/displays adequate comfort level or baseline comfort level  Description: Interventions:  - Encourage patient to monitor pain and request assistance  - Assess pain using appropriate pain scale  - Administer analgesics based on type and severity of pain and evaluate response  - Implement non-pharmacological measures as appropriate and evaluate response  - Consider cultural and social influences on pain and pain management  - Notify physician/advanced practitioner if interventions unsuccessful or patient reports new pain  Outcome: Progressing     Problem: INFECTION - ADULT  Goal: Absence or prevention of progression during hospitalization  Description: INTERVENTIONS:  - Assess and monitor for signs and symptoms of infection  - Monitor lab/diagnostic results  - Monitor all insertion sites, i e  indwelling lines, tubes, and drains  - Monitor endotracheal if appropriate and nasal secretions for changes in amount and color  - [place] appropriate cooling/warming therapies per order  - Administer medications as ordered  - Instruct and encourage patient and family to use good hand hygiene technique  - Identify and instruct in appropriate isolation precautions for identified infection/condition  Outcome: Progressing  Goal: Absence of fever/infection during neutropenic period  Description: INTERVENTIONS:  - Monitor WBC    Outcome: Progressing     Problem: SAFETY ADULT  Goal: Patient will remain free of falls  Description: INTERVENTIONS:  - Educate patient/family on patient safety including physical limitations  - Instruct patient to call for assistance with activity   - Consult OT/PT to assist with strengthening/mobility   - Keep Call bell within reach  - Keep bed low and locked with side rails adjusted as appropriate  - Keep care items and personal belongings within reach  - Initiate and maintain comfort rounds  - Make Fall Risk Sign visible to staff  - Offer Toileting every Hours, in advance of need  - Initiate/Maintain alarm  - Obtain necessary fall risk management equipment:   - Apply yellow socks and bracelet for high fall risk patients  - Consider moving patient to room near nurses station  Outcome: Progressing  Goal: Maintain or return to baseline ADL function  Description: INTERVENTIONS:  -  Assess patient's ability to carry out ADLs; assess patient's baseline for ADL function and identify physical deficits which impact ability to perform ADLs (bathing, care of mouth/teeth, toileting, grooming, dressing, etc )  - Assess/evaluate cause of self-care deficits   - Assess range of motion  - Assess patient's mobility; develop plan if impaired  - Assess patient's need for assistive devices and provide as appropriate  - Encourage maximum independence but intervene and supervise when necessary  - Involve family in performance of ADLs  - Assess for home care needs following discharge   - Consider OT consult to assist with ADL evaluation and planning for discharge  - Provide patient education as appropriate  Outcome: Progressing  Goal: Maintains/Returns to pre admission functional level  Description: INTERVENTIONS:  - Perform BMAT or MOVE assessment daily    - Set and communicate daily mobility goal to care team and patient/family/caregiver  - Collaborate with rehabilitation services on mobility goals if consulted  - Perform Range of Motion  times a day  - Reposition patient every hours    - Dangle patient  times a day  - Stand patient  times a day  - Ambulate patient  times a day  - Out of bed to chair  times a day   - Out of bed for meals  times a day  - Out of bed for toileting  - Record patient progress and toleration of activity level   Outcome: Progressing     Problem: DISCHARGE PLANNING  Goal: Discharge to home or other facility with appropriate resources  Description: INTERVENTIONS:  - Identify barriers to discharge w/patient and caregiver  - Arrange for needed discharge resources and transportation as appropriate  - Identify discharge learning needs (meds, wound care, etc )  - Arrange for interpretive services to assist at discharge as needed  - Refer to Case Management Department for coordinating discharge planning if the patient needs post-hospital services based on physician/advanced practitioner order or complex needs related to functional status, cognitive ability, or social support system  Outcome: Progressing     Problem: Nutrition/Hydration-ADULT  Goal: Nutrient/Hydration intake appropriate for improving, restoring or maintaining nutritional needs  Description: Monitor and assess patient's nutrition/hydration status for malnutrition  Collaborate with interdisciplinary team and initiate plan and interventions as ordered  Monitor patient's weight and dietary intake as ordered or per policy  Utilize nutrition screening tool and intervene as necessary  Determine patient's food preferences and provide high-protein, high-caloric foods as appropriate       INTERVENTIONS:  - Monitor oral intake, urinary output, labs, and treatment plans  - Assess nutrition and hydration status and recommend course of action  - Evaluate amount of meals eaten  - Assist patient with eating if necessary   - Allow adequate time for meals  - Recommend/ encourage appropriate diets, oral nutritional supplements, and vitamin/mineral supplements  - Order, calculate, and assess calorie counts as needed  - Recommend, monitor, and adjust tube feedings and TPN/PPN based on assessed needs  - Assess need for intravenous fluids  - Provide specific nutrition/hydration education as appropriate  - Include patient/family/caregiver in decisions related to nutrition  Outcome: Progressing

## 2023-02-28 NOTE — CONSULTS
Kieran Maya is a 75 y o  female who was receiving Vancomycin IV with management by the Pharmacy Consult service for treatment of Bacteremia (goal -600, trough >10)    The patient's Vancomycin therapy has been completed / discontinued  Thank you for allowing us to take part in this patient's care  Pharmacy will sign-off now; please call or re-consult if there are any questions  She Braun, PharmD   [facility]

## 2023-02-28 NOTE — PLAN OF CARE
Problem: OCCUPATIONAL THERAPY ADULT  Goal: Performs self-care activities at highest level of function for planned discharge setting  See evaluation for individualized goals  Description: Treatment Interventions: ADL retraining, Functional transfer training, Endurance training, Cognitive reorientation, Patient/family training, Equipment evaluation/education, Compensatory technique education, Energy conservation, Activityengagement          See flowsheet documentation for full assessment, interventions and recommendations  Outcome: Progressing  Note: Limitation: Decreased ADL status, Decreased endurance, Decreased self-care trans, Decreased high-level ADLs, Decreased cognition, Decreased Safe judgement during ADL  Prognosis: Good  Assessment: Pt seen for participation in Occupational Therapy session with focus on activity tolerance, functional transfers/standing tolerance and balance for pt engagement in LB self-care tasks and energy conservation techniques and UB self-care  Pt cleared by RN/Lisbeth for pt participated in OT session  Pt presented sitting out of bed to bedside chair and agreeable to participate in therapy following pt identifiers confirmed  Pt required assist  UB/LB self-care 2* pt deconditioning  He was able to tolerate sitting at sink for bathing dressing and grooming tasks after OT set up and instructions  Pt will require post acute rehab service to continue to address these above noted pt deficit which currently impair pt ADL and functional mob  Pt return to sitting out of bed to bedside chair post session, chair alarm activated and all needs within reach       OT Discharge Recommendation: Post acute rehabilitation services

## 2023-02-28 NOTE — RESTORATIVE TECHNICIAN NOTE
Restorative Technician Note      Patient Name: Farnaz Stone     Restorative Tech Visit Date: 02/28/23  Note Type: Mobility  Patient Position Upon Consult: Bedside chair  Activity Performed: Repositioned  Patient Position at End of Consult: Bedside chair;  All needs within reach; Bed/Chair alarm activated    Edilson Watkins Restorative Technician

## 2023-02-28 NOTE — ASSESSMENT & PLAN NOTE
Patient with some orthostasis  IM consulted and with overall management at their discretion during ARC course  Monitor orthostatics; PRN ADDY hose, abdominal binder - ensure adequate hydration   Currently on metoprolol succinate 25 mg twice daily with holding parameters   Plan per cardiology did not add an ARB in the setting of heart failure however as blood pressure allows

## 2023-02-28 NOTE — ASSESSMENT & PLAN NOTE
Patient refusing CPAP 2/2 claustrophobia - refuses home or hospital CPAP   2L O2 at night with spot checks   Obtain noct ox on 2L; repeat noct ox prior to d/c for possible home DME

## 2023-02-28 NOTE — ASSESSMENT & PLAN NOTE
"Nephrology had been following, nonsuppressed PTH with concerns for primary hyperparathyroidism - they defer to endo on mgmt   C spine XR 3/7 - Salt-and-pepper appearance of the skull is partially accentuated by the image processing /sharpening algorithm, though prior head CT suggests this is a true finding  Correlate for clinical/laboratory features of hyperparathyroidism; Salt-and-pepper appearance of the skull accentuated by image processing/sharpening algorithm  However, prior head CT shows a mottled appearance of the skull as well suggesting hyperparathyroidism  No focal lytic or blastic lesions  No acute fractures  Endocrine consult   \"SPEP-no monoclonal bands, UPEP- no monoclonal bands, PTH RP less than 2, 25-hydroxy vitamin D 41 7, 1,25 hydroxy vitamin D 23 7 low, Phos 2 5 low, PTH 65 4   Assessment/Plan:  1  Hypercalcemia: Suspected to be on the basis of primary hyperparathyroidism: Corrected calcium continues to increase slightly  Sensipar 30 mg daily was started and is being tolerated  Given history of osteoporosis, if appropriate surgical candidate consider surgery referral as outpatient  Follow-up with endocrinology as outpatient  Monitor corrected calcium level  Adjust dose as needed  Status post a CT of the chest abdomen and pelvis with no overt malignancy  - Patient has allergy to contrast - discussed with endo fellow  -Calcium as of 3/20 continues to come down with the use of Sensipar    Most recently down to 8 8 from a peak of 12    "

## 2023-02-28 NOTE — ASSESSMENT & PLAN NOTE
Follows with rheumatology and is on Plaquenil 200 mg Monday through Friday and 400 mg on Saturday Sunday  Continue home regimen and follow-up with rheumatology on discharge

## 2023-02-28 NOTE — ASSESSMENT & PLAN NOTE
1/2 micrococcus; 2nd set staph coag neg  · ID on board, suspected contaminant  · Now off ABX  · Repeat BC negative to date

## 2023-02-28 NOTE — ASSESSMENT & PLAN NOTE
Patient with recent admission in early 2023 for right MCA CVA with petechial hemorrhage and residual left-sided weakness and dysarthria  Patient had mechanical thrombectomy with Dr Bill Maldonado on 2/12

## 2023-02-28 NOTE — PROGRESS NOTES
[facility]  Progress Note - Zondra Apley 1955, 75 y o  female MRN: [id number]  Unit/Bed#: [facility] 628-01 Encounter: [id number]  Primary Care Provider: Jaydon Zendejas DO   Date and time admitted to hospital: 2/22/2023  1:16 PM    * Acute metabolic encephalopathy  Assessment & Plan  Presents with AMS from outpatient facility recently admitted to [facility] for CVA where she underwent revascularization of a right m1 occlusion (Dr Etienne Garcia 2/12/2023)  · Found to have acute CVA, plan as below  Still with questionable seizure event versus related to opioids as well as some improvement with Narcan  · Overall much improved, mental status back to baseline  · S/p speech eval cleared for pureed with thin liquid  · Rest of plan as below  · PT/OT recommending rehab, case management following  Bacteremia  Assessment & Plan  1/2 micrococcus; 2nd set staph coag neg  · ID on board, suspected contaminant  · Now off ABX  · Repeat BC negative to date  Acute CVA (cerebrovascular accident) [facility])  Assessment & Plan  Recent hx of right MCA CVA with petechial hemorrhage w residual L sided weakness and dysarthria s/p mechanical thrombectomy  · MRI this admission showed new small acute interval suspected lacunar infarction in the right cerebral peduncle  · Seen by neurology, now on Eliquis  Aspirin discontinued  Continue atorvastatin  · Unclear if this was etiology for presentation, possible seizure with postictal state but patient also had improvement in her symptoms with Narcan as well  No plan for antiepileptics at this time per neurology  · Outpatient follow-up with neurology in 4 weeks      HFrEF (heart failure with reduced ejection fraction) ([facility])  Assessment & Plan  Wt Readings from Last 3 Encounters:   02/16/23 62 6 kg (138 lb)   02/12/23 67 9 kg (149 lb 11 1 oz)   09/14/22 67 kg (147 lb 12 8 oz)   · Stress/stroke induced cardiomyopathy    History of cardiomyopathy in the past EF as low as 10 to 15% in 2004, more recently improved EF to 35 to 40%  · EF 25-30% on echo during prior admission   · Declined lifevest on prior admission  · Maintained on metoprolol succinate (changed from coreg prior admission), Not chronically on ace or arb  · Appears clinically compensated  · Daily weights / I and O's     HTN (hypertension)  Assessment & Plan  · On toprol xl    Hypercalcemia  Assessment & Plan  · POA and has had intermittent elevations in the past  · Nephrology following  Non suppressed PTH thus concern for primary hyperparathyroidism  · Status post endocrinology consult, s/p SPEP no monoclonal bands, nml vit D hydroxy levels    · S/p CT C/A/P no occult mass to suggest underlying malignancy  · If levels rise above 11, to consider sensipar per nephrology  · Avoid calcium supplements  · Will need outpatient endocrinology referral     Rheumatoid arthritis ([facility] )  Assessment & Plan  · Noted and follows up with rheum outpatient   · Continue plaquenil     Discoloration of skin of foot  Assessment & Plan  · Unclear etiology, presenting with erythremia  Now resolved  Her daughter notes her skin is typically "blue"  · Low procal, afebrile, no leukocytosis thus doubt infectious process thus will monitored off abx  · S/p LE arterial duplex no evidence of significant arterial occulusive disease     Osteoporosis  Assessment & Plan  · Outpatient endo referral      Chronic pain syndrome  Assessment & Plan  · Noted history   · PDMP reviewed on [place] contin 15 mg BID   · Was also on prn oxycodone (at [facility]) but per daughter wants to to avoid additional narcotics    Bipolar depression ([facility] )  Assessment & Plan  · On buproprion, duloxetine, Lamictal       VTE Pharmacologic Prophylaxis: VTE Score: 7 Moderate Risk (Score 3-4) - Pharmacological DVT Prophylaxis Ordered: apixaban (Eliquis)  Patient Centered Rounds: I performed bedside rounds with nursing staff today    Discussions with Specialists or Other Care Team Provider: nursing,cm    Education and Discussions with Family / Patient: Updated  (daughter) via phone  Total Time Spent on Date of Encounter in care of patient: 25 minutes This time was spent on one or more of the following: performing physical exam; counseling and coordination of care; obtaining or reviewing history; documenting in the medical record; reviewing/ordering tests, medications or procedures; communicating with other healthcare professionals and discussing with patient's family/caregivers  Current Length of Stay: 6 day(s)    Current Patient Status: Inpatient     Certification Statement: The patient will continue to require additional inpatient hospital stay due to pending placement     Discharge Plan: Anticipate discharge later today or tomorrow to rehab facility  Code Status: Level 1 - Full Code    Subjective:   No complaints  Awaiting rehab  Doesn't like pureed food, hopeful this can be upgraded with ongoing speech therapy  Objective:     Vitals:   Temp (24hrs), Av 6 °F (36 4 °C), Min:96 9 °F (36 1 °C), Max:98 °F (36 7 °C)    Temp:  [96 9 °F (36 1 °C)-98 °F (36 7 °C)] 98 °F (36 7 °C)  HR:  [] 106  Resp:  [15-16] 16  BP: ()/(62-75) 120/75  SpO2:  [89 %-98 %] 96 %  There is no height or weight on file to calculate BMI  Input and Output Summary (last 24 hours): Intake/Output Summary (Last 24 hours) at 2023 1017  Last data filed at 2023 0916  Gross per 24 hour   Intake 300 ml   Output --   Net 300 ml       Physical Exam:   Physical Exam  Vitals and nursing note reviewed  Constitutional:       General: She is not in acute distress  Cardiovascular:      Rate and Rhythm: Tachycardia present  Comments: HR low 100's  Pulmonary:      Breath sounds: Normal breath sounds  Abdominal:      Tenderness: There is no abdominal tenderness  Musculoskeletal:         General: No swelling  Skin:     General: Skin is warm     Neurological: Mental Status: She is alert and oriented to person, place, and time  Mental status is at baseline  Comments: Left sided weakness   Psychiatric:         Mood and Affect: Mood normal           Additional Data:     Labs:  Results from last 7 days   Lab Units 02/27/23  0938 02/24/23  0422 02/23/23  0452   WBC Thousand/uL 7 03   < > 7 20   HEMOGLOBIN g/dL 13 9   < > 14 3   HEMATOCRIT % 42 2   < > 43 8   PLATELETS Thousands/uL 261   < > 261   NEUTROS PCT %  --   --  75   LYMPHS PCT %  --   --  14   MONOS PCT %  --   --  10   EOS PCT %  --   --  1    < > = values in this interval not displayed  Results from last 7 days   Lab Units 02/28/23  0519 02/26/23  1151 02/25/23  0615 02/24/23  0422 02/23/23  0452   SODIUM mmol/L 142   < > 140   < > 139   POTASSIUM mmol/L 4 2   < > 3 8   < > 4 1   CHLORIDE mmol/L 111*   < > 113*   < > 112*   CO2 mmol/L 26   < > 22   < > 23   BUN mg/dL 15   < > 12   < > 13   CREATININE mg/dL 0 83   < > 0 70   < > 0 84   ANION GAP mmol/L 5   < > 5   < > 4   CALCIUM mg/dL 10 6*   < > 10 0   < > 11 1*   ALBUMIN g/dL  --   --  2 6*  --  3 1*   TOTAL BILIRUBIN mg/dL  --   --   --   --  0 54   ALK PHOS U/L  --   --   --   --  48   ALT U/L  --   --   --   --  19   AST U/L  --   --   --   --  22   GLUCOSE RANDOM mg/dL 98   < > 98   < > 81    < > = values in this interval not displayed  Results from last 7 days   Lab Units 02/22/23  1400   INR  0 95             Results from last 7 days   Lab Units 02/22/23  1400   LACTIC ACID mmol/L 1 4   PROCALCITONIN ng/ml 0 06       Lines/Drains:  Invasive Devices     Peripheral Intravenous Line  Duration           Peripheral IV 02/26/23 Dorsal (posterior); Right Forearm 1 day                  Telemetry:  Telemetry Orders (From admission, onward)             LifeVest Patient: Continuous Telemetry Monitoring during hospitalization (non-expiring)  Continuous LifeVest Telemetry Monitoring        References:    LifeVest Policy                 Telemetry Reviewed: Sinus Tachycardia  Indication for Continued Telemetry Use: Acute CHF on >200 mg lasix/day or equivalent dose or with new reduced EF  Imaging: No pertinent imaging reviewed  Recent Cultures (last 7 days):   Results from last 7 days   Lab Units 02/24/23  1525 02/24/23  1524 02/22/23  1405 02/22/23  1402   BLOOD CULTURE  No Growth at 72 hrs  No Growth at 72 hrs  Staphylococcus coagulase negative* Micrococcus luteus*   GRAM STAIN RESULT   --   --  Gram positive cocci in clusters* Gram positive cocci in clusters*       Last 24 Hours Medication List:   Current Facility-Administered Medications   Medication Dose Route Frequency Provider Last Rate   • acetaminophen  650 mg Oral Q6H PRN Curt Caballero PA-C     • albuterol  2 puff Inhalation Q6H PRN Sarah Staples PA-C     • aluminum-magnesium hydroxide-simethicone  30 mL Oral Q4H PRN Curt Caballero PA-C     • apixaban  5 mg Oral BID Stephanie Pickett DO     • atorvastatin  40 mg Oral QPM Sarah Staples PA-C     • buPROPion  100 mg Oral BID Sarah Staples PA-C     • DULoxetine  60 mg Oral BID Sarah Staples PA-C     • gabapentin  300 mg Oral BID Sarah Staples PA-C     • glycerin-hypromellose-  1 drop Both Eyes Q4H PRN Roshan Guo DO     • hydroxychloroquine  200 mg Oral Once per day on Mon Tue Wed Thu Fri LAURA Lima     • hydroxychloroquine  400 mg Oral Once per day on Sun Sat LAURA Lima     • lamoTRIgine  150 mg Oral HS Sarah Staples PA-C     • metoprolol succinate  25 mg Oral BID Sarah Staples PA-C     • morphine  15 mg Oral BID Sarah Staples PA-C     • ondansetron  4 mg Intravenous Q6H PRN Alessandra Beltran DO     • polyethylene glycol  17 g Oral Daily PRN Sarah Staples PA-C     • senna-docusate sodium  1 tablet Oral BID Sarah Staples PA-C          Today, Patient Was Seen By: LAURA Daley    **Please Note: This note may have been constructed using a voice recognition system  **

## 2023-02-28 NOTE — PLAN OF CARE
Problem: Nutrition/Hydration-ADULT  Goal: Nutrient/Hydration intake appropriate for improving, restoring or maintaining nutritional needs  Description: Monitor and assess patient's nutrition/hydration status for malnutrition  Collaborate with interdisciplinary team and initiate plan and interventions as ordered  Monitor patient's weight and dietary intake as ordered or per policy  Utilize nutrition screening tool and intervene as necessary  Determine patient's food preferences and provide high-protein, high-caloric foods as appropriate       INTERVENTIONS:  - Monitor oral intake, urinary output, labs, and treatment plans  - Assess nutrition and hydration status and recommend course of action  - Evaluate amount of meals eaten  - Assist patient with eating if necessary   - Allow adequate time for meals  - Recommend/ encourage appropriate diets, oral nutritional supplements, and vitamin/mineral supplements  - Order, calculate, and assess calorie counts as needed  - Recommend, monitor, and adjust tube feedings and TPN/PPN based on assessed needs  - Assess need for intravenous fluids  - Provide specific nutrition/hydration education as appropriate  - Include patient/family/caregiver in decisions related to nutrition  Outcome: Progressing     Problem: PAIN - ADULT  Goal: Verbalizes/displays adequate comfort level or baseline comfort level  Description: Interventions:  - Encourage patient to monitor pain and request assistance  - Assess pain using appropriate pain scale  - Administer analgesics based on type and severity of pain and evaluate response  - Implement non-pharmacological measures as appropriate and evaluate response  - Consider cultural and social influences on pain and pain management  - Notify physician/advanced practitioner if interventions unsuccessful or patient reports new pain  Outcome: Progressing     Problem: INFECTION - ADULT  Goal: Absence or prevention of progression during hospitalization  Description: INTERVENTIONS:  - Assess and monitor for signs and symptoms of infection  - Monitor lab/diagnostic results  - Monitor all insertion sites, i e  indwelling lines, tubes, and drains  - Monitor endotracheal if appropriate and nasal secretions for changes in amount and color  - [place] appropriate cooling/warming therapies per order  - Administer medications as ordered  - Instruct and encourage patient and family to use good hand hygiene technique  - Identify and instruct in appropriate isolation precautions for identified infection/condition  Outcome: Progressing     Problem: SAFETY ADULT  Goal: Patient will remain free of falls  Description: INTERVENTIONS:  - Educate patient/family on patient safety including physical limitations  - Instruct patient to call for assistance with activity   - Consult OT/PT to assist with strengthening/mobility   - Keep Call bell within reach  - Keep bed low and locked with side rails adjusted as appropriate  - Keep care items and personal belongings within reach  - Initiate and maintain comfort rounds  - Make Fall Risk Sign visible to staff  - Apply yellow socks and bracelet for high fall risk patients  - Consider moving patient to room near nurses station  Outcome: Progressing

## 2023-02-28 NOTE — OCCUPATIONAL THERAPY NOTE
Occupational Therapy Treatment Note     02/28/23 0916   OT Last Visit   OT Visit Date 02/28/23   Note Type   Note Type Treatment for insurance authorization   Pain Assessment   Pain Assessment Tool 0-10   Restrictions/Precautions   Weight Bearing Precautions Per Order No   Lifestyle   Autonomy pta, pt reports she was I W ADL/IADL, rw mobility  +   Reciprocal Relationships supportive brother and son- was at [facility] prior to admission here   Service to Others retired   Intrinsic Gratification keeping active   ADL   Where Assessed Sitting at [place] [id number]  Supervision/Setup   Grooming Deficit Setup; Increased time to complete   UB Bathing Assistance 4  Minimal Assistance   UB Bathing Deficit Right arm;Left arm; Chest;Abdomen   LB Bathing Assistance 3  Moderate Assistance   LB Bathing Deficit Buttocks; Left upper leg;Right upper leg   UB Dressing Assistance 4  Minimal Assistance   UB Dressing Deficit Thread RUE; Thread LUE;Pull around back   LB Dressing Assistance 3  Moderate Assistance   LB Dressing Deficit Thread RLE into pants;Pull up over hips   Transfers   Sit to Stand 3  Moderate assistance   Additional items Assist x 1   Stand to Sit 4  Minimal assistance   Additional items Assist x 1   Subjective   Subjective pt stated " my hair brush is in the plastilc bag with the yellow handles" however OT could not locate her bag   Cognition   Overall Cognitive Status Impaired   Arousal/Participation Alert; Responsive; Cooperative   Attention Attends with cues to redirect   Orientation Level Oriented X4   Memory Decreased recall of precautions   Following Commands Follows one step commands with increased time or repetition   Activity Tolerance   Activity Tolerance Patient tolerated treatment well   Assessment   Assessment Pt seen for participation in Occupational Therapy session with focus on activity tolerance, functional transfers/standing tolerance and balance for pt engagement in LB self-care tasks and energy conservation techniques and UB self-care  Pt cleared by RN/Lisbeth for pt participated in OT session  Pt presented sitting out of bed to bedside chair and agreeable to participate in therapy following pt identifiers confirmed  Pt required assist  UB/LB self-care 2* pt deconditioning  He was able to tolerate sitting at sink for bathing dressing and grooming tasks after OT set up and instructions  Pt will require post acute rehab service to continue to address these above noted pt deficit which currently impair pt ADL and functional mob  Pt return to sitting out of bed to bedside chair post session, chair alarm activated and all needs within reach     Plan   Treatment Interventions ADL retraining   Goal Expiration Date 03/10/23   OT Treatment Day 2   OT Frequency 2-3x/wk   Recommendation   OT Discharge Recommendation Post acute rehabilitation services   AM-PAC Daily Activity Inpatient   Lower Body Dressing 2   Bathing 2   Toileting 2   Upper Body Dressing 3   Grooming 3   Eating 4   Daily Activity Raw Score 16   Daily Activity Standardized Score (Calc for Raw Score >=11) 35 96   AM-PAC Applied Cognition Inpatient   Following a Speech/Presentation 3   Understanding Ordinary Conversation 3   Taking Medications 3   Remembering Where Things Are Placed or Put Away 2   Remembering List of 4-5 Errands 2   Taking Care of Complicated Tasks 2   Applied Cognition Raw Score 15   Applied Cognition Standardized Score 33 54   Barthel Index   Grooming Score 5   Dressing Score 5   Toilet Use Score 5   Transfers (Bed/Chair) Score 5   Mobility (Level Surface) Score 0         Dominick SILVERMAN/CARMELO

## 2023-02-28 NOTE — PROGRESS NOTES
NEPHROLOGY PROGRESS NOTE   Radha Hart 75 y o  female MRN: [id number]  Unit/Bed#: [facility] 628-01 Encounter: [id number]      ASSESSMENT/PLAN:  1  Hypercalcemia: this has been chronic/intermittent for the past few years  Suspect primary hyperparathyroid as PTH not suppressed  Calcium stable at 10 6 today, iCa 1 33   · Seen by endocrine who recommended outpatient f/u +/- sensipar if worsening   · PTH 65 4, Phos 2 4, vit D 41 7, vit D 1,25 dihydroxy  23 7, SPEP and UPEP no monoclonal bands  · Pending: PTHrP  · CT C/A/P and head: No signs of malignancy  · Avoid any calcium or vitamin D products  · Check am BMP   2  Encephalopathy: with recent CVA  Now improved  3  HFrEF with EF 25-30%   · Not on an outpatient diuretic   · Appears euvolemic to slightly dry    4  HTN: BP acceptable   · Continue metoprolol 25mg bid   5  RA: on plaquenil   6  Bipolar: no hx of lithium use per patient     Plan Summary:   • Check am BMP   • Ok to d/c to rehab from renal standpoint when cleared by primary team     SUBJECTIVE:  Feeling well today with no complaints  Good po intake  Looking forward to going to rehab       OBJECTIVE:  Current Weight:    Vitals:    02/28/23 0728   BP: 120/75   Pulse: (!) 106   Resp: 16   Temp: 98 °F (36 7 °C)   SpO2: 96%       Intake/Output Summary (Last 24 hours) at 2/28/2023 0285  Last data filed at 2/27/2023 0901  Gross per 24 hour   Intake 240 ml   Output --   Net 240 ml       General:  appears comfortable and in no acute distress   Skin:  No rash  Eyes:  Sclerae anicteric, no periorbital edema   ENT:  Moist mucous membranes  Neck:  Trachea midline, symmetric   Chest:  Clear to auscultation bilaterally with no wheezes, rales or rhonchi  CVS:  Regular rate and rhythm  Abdomen:  Soft, nontender, nondistended  Neuro:  Awake and alert  Psych:  Appropriate affect  Extremities: no lower extremity edema       Medications:  Scheduled Meds:  Current Facility-Administered Medications   Medication Dose Route Frequency Provider Last Rate   • acetaminophen  650 mg Oral Q6H PRN Klaus Riojas PA-C     • albuterol  2 puff Inhalation Q6H PRN Sarah Staples PA-C     • aluminum-magnesium hydroxide-simethicone  30 mL Oral Q4H PRN Klaus Riojas PA-C     • apixaban  5 mg Oral BID Merline Get, DO     • atorvastatin  40 mg Oral QPM Sarah Staples PA-C     • buPROPion  100 mg Oral BID Sarah Staples PA-C     • DULoxetine  60 mg Oral BID Sarah Staples PA-C     • gabapentin  300 mg Oral BID Sarah Staples PA-C     • glycerin-hypromellose-  1 drop Both Eyes Q4H PRN Aleksandra Ozuna,      • hydroxychloroquine  200 mg Oral Once per day on Mon Tue Wed Thu Fri Sheree Pink, CRNP     • hydroxychloroquine  400 mg Oral Once per day on Sun Sat Sheree Pink, CRNP     • lamoTRIgine  150 mg Oral HS Sarah Staples PA-C     • metoprolol succinate  25 mg Oral BID Sarah Staples PA-C     • morphine  15 mg Oral BID Sarah Staples PA-C     • ondansetron  4 mg Intravenous Q6H PRN Ela Beltran DO     • polyethylene glycol  17 g Oral Daily PRN Sarah Staples PA-C     • senna-docusate sodium  1 tablet Oral BID Sarah Staples PA-C         PRN Meds: •  acetaminophen  •  albuterol  •  aluminum-magnesium hydroxide-simethicone  •  glycerin-hypromellose-  •  ondansetron  •  polyethylene glycol    Laboratory Results:  Results from last 7 days   Lab Units 02/28/23  0519 02/27/23  0938 02/26/23  1151 02/25/23  0615 02/24/23  0422 02/23/23  0452 02/22/23 2004 02/22/23  1405 02/22/23  1400   WBC Thousand/uL  --  7 03  --   --  7 20 7 20  --  7 80  --    HEMOGLOBIN g/dL  --  13 9  --   --  13 8 14 3  --  13 6  --    HEMATOCRIT %  --  42 2  --   --  40 9 43 8  --  41 4  --    PLATELETS Thousands/uL  --  261  --   --  240 261 248 270  --    SODIUM mmol/L 142 138 137 140 143 139  --   --  142   POTASSIUM mmol/L 4 2 4 6 3 9 3 8 3 2* 4 1  --   --  4 4   CHLORIDE mmol/L 111* 110* 111* 113* 113* 112*  --   --  111*   CO2 mmol/L 26 28 25 22 25 23  --   --  29   BUN mg/dL 15 14 18 12 13 13  --   --  15   CREATININE mg/dL 0 83 0 78 0 87 0 70 0 78 0 84  --   --  0 93   CALCIUM mg/dL 10 6* 10 9* 10 0 10 0 10 7* 11 1*  --   --  10 9*   MAGNESIUM mg/dL  --   --   --  1 8  --   --   --   --   --    PHOSPHORUS mg/dL  --   --   --   --  2 4  --   --   --   --

## 2023-03-01 LAB
BACTERIA BLD CULT: NORMAL
BACTERIA BLD CULT: NORMAL

## 2023-03-01 RX ORDER — METOPROLOL SUCCINATE 25 MG/1
25 TABLET, EXTENDED RELEASE ORAL 2 TIMES DAILY
Status: DISCONTINUED | OUTPATIENT
Start: 2023-03-01 | End: 2023-03-25 | Stop reason: HOSPADM

## 2023-03-01 RX ORDER — MUSCLE RUB CREAM 100; 150 MG/G; MG/G
CREAM TOPICAL 4 TIMES DAILY PRN
Status: DISCONTINUED | OUTPATIENT
Start: 2023-03-01 | End: 2023-03-25 | Stop reason: HOSPADM

## 2023-03-01 RX ORDER — MELOXICAM 7.5 MG/1
7.5 TABLET ORAL ONCE
Status: COMPLETED | OUTPATIENT
Start: 2023-03-01 | End: 2023-03-01

## 2023-03-01 RX ORDER — LIDOCAINE 50 MG/G
1 PATCH TOPICAL DAILY
Status: DISCONTINUED | OUTPATIENT
Start: 2023-03-02 | End: 2023-03-25 | Stop reason: HOSPADM

## 2023-03-01 RX ORDER — ONDANSETRON 4 MG/1
4 TABLET, ORALLY DISINTEGRATING ORAL EVERY 6 HOURS PRN
Status: DISCONTINUED | OUTPATIENT
Start: 2023-03-01 | End: 2023-03-25 | Stop reason: HOSPADM

## 2023-03-01 RX ADMIN — DULOXETINE HYDROCHLORIDE 60 MG: 60 CAPSULE, DELAYED RELEASE ORAL at 21:09

## 2023-03-01 RX ADMIN — MORPHINE SULFATE 15 MG: 15 TABLET, FILM COATED, EXTENDED RELEASE ORAL at 09:09

## 2023-03-01 RX ADMIN — HYDROXYCHLOROQUINE SULFATE 200 MG: 200 TABLET ORAL at 09:12

## 2023-03-01 RX ADMIN — ALUMINUM HYDROXIDE, MAGNESIUM HYDROXIDE, AND DIMETHICONE 30 ML: 200; 20; 200 SUSPENSION ORAL at 03:39

## 2023-03-01 RX ADMIN — MENTHOL, UNSPECIFIED FORM AND METHYL SALICYLATE: 10; 150 CREAM TOPICAL at 21:10

## 2023-03-01 RX ADMIN — METOPROLOL SUCCINATE 25 MG: 25 TABLET, EXTENDED RELEASE ORAL at 17:41

## 2023-03-01 RX ADMIN — SENNOSIDES AND DOCUSATE SODIUM 1 TABLET: 8.6; 5 TABLET ORAL at 09:11

## 2023-03-01 RX ADMIN — GABAPENTIN 300 MG: 300 CAPSULE ORAL at 09:12

## 2023-03-01 RX ADMIN — ONDANSETRON 4 MG: 2 INJECTION INTRAMUSCULAR; INTRAVENOUS at 11:40

## 2023-03-01 RX ADMIN — ATORVASTATIN CALCIUM 40 MG: 40 TABLET, FILM COATED ORAL at 17:41

## 2023-03-01 RX ADMIN — LAMOTRIGINE 150 MG: 100 TABLET ORAL at 21:09

## 2023-03-01 RX ADMIN — BUPROPION HYDROCHLORIDE TABLETS 100 MG: 100 TABLET, FILM COATED ORAL at 09:12

## 2023-03-01 RX ADMIN — GABAPENTIN 300 MG: 300 CAPSULE ORAL at 17:41

## 2023-03-01 RX ADMIN — SENNOSIDES AND DOCUSATE SODIUM 1 TABLET: 8.6; 5 TABLET ORAL at 17:42

## 2023-03-01 RX ADMIN — APIXABAN 5 MG: 5 TABLET, FILM COATED ORAL at 09:12

## 2023-03-01 RX ADMIN — DULOXETINE HYDROCHLORIDE 60 MG: 60 CAPSULE, DELAYED RELEASE ORAL at 09:12

## 2023-03-01 RX ADMIN — METOPROLOL SUCCINATE 25 MG: 25 TABLET, EXTENDED RELEASE ORAL at 09:11

## 2023-03-01 RX ADMIN — MELOXICAM 7.5 MG: 7.5 TABLET ORAL at 21:10

## 2023-03-01 RX ADMIN — APIXABAN 5 MG: 5 TABLET, FILM COATED ORAL at 17:41

## 2023-03-01 RX ADMIN — MORPHINE SULFATE 15 MG: 15 TABLET, FILM COATED, EXTENDED RELEASE ORAL at 17:42

## 2023-03-01 RX ADMIN — BUPROPION HYDROCHLORIDE TABLETS 100 MG: 100 TABLET, FILM COATED ORAL at 17:44

## 2023-03-01 RX ADMIN — ACETAMINOPHEN 650 MG: 325 TABLET ORAL at 03:33

## 2023-03-01 RX ADMIN — ONDANSETRON 4 MG: 4 TABLET, ORALLY DISINTEGRATING ORAL at 19:56

## 2023-03-01 RX ADMIN — ACETAMINOPHEN 650 MG: 325 TABLET ORAL at 14:55

## 2023-03-01 NOTE — PROGRESS NOTES
Occupational Therapy Initial Evaluation     03/01/23 1000   Patient Data   Rehab Impairment Impairment of mobility, safety and Activities of Daily Living (ADLs) due to Stroke:  01 1  Left Body Involvement (Right Brain)   Etiologic Diagnosis Right MCA CVA and new small acute infarction in the right cerebral peduncle   Date of Onset 02/12/23   Support System   Name Pt lives w/ brother, Aline Mckenna and son, Tino Wells who are able to partially A w/ IADL management at d/c, however support limited  Pt's daughter, Antonella Piper lives locally and able to A w/ IADL management at time of d/c  Able to provide 24 hour supervision No   Able to provide physical help? No   Home Setup   Type of Home Multi Level   Method of Entry Stairs   Number of Stairs 1   Number of Stairs in Home 12  (full flight)   In Home Hand Rail Bilateral   First Floor Bathroom Full; Shower;Grab Bars   First Floor Bathroom Accessibility Raised toilet seat;Grab bars in tub/shower; Shower chair   First Floor Setup Available Yes   Home Modifications Necessary?   (TBD)   Available Equipment Roller Walker;Single [employer]; Shower Chair   Baseline Information   Transportation    Prior Device(s) Used Roller Walker;Single Point Cane  (RW vs SPC PRN)   Prior IADL Participation   Money Management   (daughter A)   Meal Preparation Partial Participation;Microwave   Laundry Partial Participation  (plan for daughter to A at d/c, laundry in basement)   Home Cleaning Partial Participation  (daughter A)   Prior Level of Function   Self-Care 3  Independent - Patient completed the activities by him/herself, with or without an assistive device, with no assistance from a helper  Indoor-Mobility (Ambulation) 3  Independent - Patient completed the activities by him/herself, with or without an assistive device, with no assistance from a helper  Stairs 3   Independent - Patient completed the activities by him/herself, with or without an assistive device, with no assistance from a helper  Functional Cognition 3  Independent - Patient completed the activities by him/herself, with or without an assistive device, with no assistance from a helper  Prior Assistance Needed for Money Management;Meal Preparation;Household Chores/Cleaning   Prior Device Used D  Micah Panning in the Last Year   Number of falls in the past 12 months 4   Type of Injury Associated with Fall Injury  (R RTC tear, broken ribs)   Patient Preference   [place] (Patient Preference) [phone] (WDL) [place]DL   Patient Behaviors/Mood Appropriate for age; Appropriate for situation;Brightens with approach;Calm; Cooperative;Pleasant; Anxious   Restrictions/Precautions   Precautions Bed/chair alarms;Cognitive; Fall Risk;Supervision on toilet/commode;Aspiration   Weight Bearing Restrictions No   ROM Restrictions No   Pain Assessment   Pain Assessment Tool 0-10   Pain Score 5   Pain Location/Orientation Orientation: Lower; Location: Back   Eating Assessment   Type of Assistance Needed Set-up / clean-up   Physical Assistance Level No physical assistance   Eating CARE Score 5   Oral Hygiene   Type of Assistance Needed Physical assistance   Physical Assistance Level Total assistance   Comment reports completing in stance at baseline, req Mod A to steady in stance, SBA of 2nd person for safety  Oral Hygiene CARE Score 1   Tub/Shower Transfer   Reason Not Assessed Sponge Bath;Safety;Balance   Shower/Bathe Self   Type of Assistance Needed Physical assistance   Physical Assistance Level Total assistance   Comment reports standing for bathing at baseline, would require Ax2 for all bathing in stance  While seated and vc's to initiate, bathed BUE and ABD, A for RUE thoroughness  Mod A in stance to bathe santiago and buttocks w/ cues to inc attention to task, SBA of 2nd person for safety  Pt did attempt to take steps despite cues for static standing while bathing buttocks, req redirection     Shower/Bathe Self CARE Score 1 Dressing/Undressing Clothing   Type of Assistance Needed Physical assistance   Physical Assistance Level 76% or more   Comment while seated A to thread LUE, A to thread RUE to protect IV site  Pt able to partially pull OH and down back w/ A to fully manage  Upper Body Dressing CARE Score 2   Type of Assistance Needed Physical assistance;Verbal cues; Adaptive equipment;Set-up / clean-up   Physical Assistance Level Total assistance   Comment A to fully thread BLE while seated, Mod A in stance for clothing management w/ A to fully manage over L hip and in back, SBA of 2nd person for safety  Lower Body Dressing CARE Score 1   Putting On/Taking Off Footwear   Type of Assistance Needed Physical assistance   Physical Assistance Level Total assistance   Comment A to don/doff socks  Putting On/Taking Off Footwear CARE Score 1   Toileting Hygiene   Type of Assistance Needed Physical assistance; Adaptive equipment;Verbal cues   Physical Assistance Level Total assistance   Comment Mod A to steady in stance for hygiene and clothing management w/ cues for sequencing and A to fully manage pants over L hip and fully up in back  SBA of 2nd person for safety  Toileting Hygiene CARE Score 1   Toilet Transfer   Type of Assistance Needed Physical assistance; Adaptive equipment;Verbal cues   Physical Assistance Level Total assistance   Comment Mod Ax1 stand pivot w/ RW to standard BSC, SBA of 2nd person for safety as pt demo impaired divided attention and difficulty sequencing  Toilet Transfer CARE Score 1   Transfer Bed/Chair/Wheelchair   Type of Assistance Needed Physical assistance;Verbal cues; Adaptive equipment   Physical Assistance Level Total assistance   Comment Mod Ax1 stand pivot w/ RW w/ vc's for proper body mechanics and RW management, SBA of 2nd person for safety     Chair/Bed-to-Chair Transfer CARE Score 1   Lying to Sitting on Side of Bed   Type of Assistance Needed Physical assistance;Verbal cues   Physical Assistance Level 76% or more   Comment A for LLE and trunk management, A to scoot to EOB  Lying to Sitting on Side of Bed CARE Score 2   Sit to Stand   Type of Assistance Needed Physical assistance   Physical Assistance Level Total assistance   Comment req A for boost and lower, SBA of 2nd person for safety  With vc's for proper hand placement and body mechanics, Mod A  Sit to Stand CARE Score 1   Comprehension   QI: Comprehension 3  Usually Understands: Understands most conversations, but misses some part/intent of message  Requires cues at times to understand  Expression   QI: Expression 3  Exhibits some difficulty with expressing needs and ideas (e g , some words or finishing thoughts) or speech is not clear   RUE Assessment   RUE Assessment WFL  (R-hand dominant, h/o RTC tear without repair s/p fall ~1 year ago  baseline arthritis)   LUE Assessment   LUE Assessment X  (baseline arthritis)   Strength - LUE   L Shoulder Flexion 4/5   L Elbow Flexion 4/5   L Elbow Extension 4/5   L Wrist Flexion   (4-/5)   L Wrist Extension   (4-/5)   L Wrist ABduction   (4-/5)   L Wrist ADduction   (4-/5)   L Digit Flexion   (4-/5)   L Digit Extension   (4-/5)   Coordination   Movements are Fluid and Coordinated 0   Coordination and Movement Description ataxia   Sensation   Light Touch Severe deficits in the LUE;Severe deficits in the LLE   Additional Comments Pt reports distal>proximal hernandez-body  Cognition   Overall Cognitive Status Impaired   Arousal/Participation Alert; Responsive; Cooperative   Attention Difficulty dividing attention   Orientation Level Oriented X4;Oriented to person;Oriented to place;Oriented to time;Oriented to situation   Memory Decreased recall of precautions;Decreased short term memory   Following Commands Follows one step commands with increased time or repetition   Comments Noted w/ impaired STM, dec divided attention, and impaired safety awareness  Pt reports STM deficit at baseline   Will benefit "from formalized cognitive assessment during rehab course to A w/ d/c planning  Vision   Visual Field Cut Comments L peripheral field cut  Will benefit from further visual assessment during rehab course  Vision Comments L visual field cut  reports wearing glasses at baseline  Perception   Inattention/Neglect Cues to attend to left side of body;Cues to attend left visual field   Perseveration Perseverates during conversation   Objective Measure   OT Measure(s) supine: 104/56 HR 95, seated: 100/62 HR96, stand: 101/62   Spot checked SpO2 >96% on RA  Pt reporting nausea at end of session, RN made aware of same  Provided w/ soft-care cushion for recliner to A w/ sacral offloading for PI prevention  Discharge Information   Vocational Plan Retired/not working   Patient's Discharge Plan home w/ family support   Patient's Rehab Expectations \"To get stronger and do more on my own  \"   Barriers to Discharge Home Limited Family Support; Unsafe Home Setup; Decreased Cognitive Function;Decreased Strength;Decreased Endurance;Pain; Safety Considerations   Impressions Pt is a 76 y/o female presenting to [facility] on 2/22/23 for altered metal status and found to have acute stroke in the R cerebral peduncle  PMH includes asthma, anxiety, bipolar depression, chronic narcotic use, previous enteroviral infection of the heart, seronegative arthropathy of multiple joints, undifferentiated connective tissue disorder, SHAR,and R MCA CVA w/ revascularization on 2/12/23  Pt reports completing ADLs and fxnl mobility independently w/ RW, partial IADLs, (+) driving  Pt is currently functioning at overall Mod-Max A for ADLs and Mod A w/ RW for stand pivot fxnl xfers   Pt is limited by dec strength, dec endurance, impaired balance, L hemiparesis, L-inattention, impaired F/GMC, impaired sensation to LUE/LLE, impaired safety awareness, dec divided attention, impaired fxnl problem-solving, L visual impairment, unsafe home set-up w/ 1 TAB, limited " social support, and ADL dysfunction  Pt will benefit from skilled OT services w/ focus on above barriers, assess need for DME, provide pt/family edu, and maximize fxnl indep to return to PLOF in 3 week ELOS to meet set-up to supervision ADL goals     OT Therapy Minutes   OT Time In 1000   OT Time Out 1130   OT Total Time (minutes) 90   OT Mode of treatment - Individual (minutes) 90   OT Mode of treatment - Concurrent (minutes) 0   OT Mode of treatment - Group (minutes) 0   OT Mode of treatment - Co-treat (minutes) 0   OT Mode of Treatment - Total time(minutes) 90 minutes   OT Cumulative Minutes 90   Cumulative Minutes   Cumulative therapy minutes 90     La Galvan MS, OTR/L

## 2023-03-01 NOTE — PROGRESS NOTES
SLP TAA       03/01/23 3585   Patient Data   Rehab Impairment Impairment of mobility, safety and Activities of Daily Living (ADLs) due to Stroke:  01 1  Left Body Involvement (Right Brain)   Etiologic Diagnosis Right MCA CVA and new small acute infarction in the right cerebral peduncle   Date of Onset 02/12/23   Support System   Name Pt reported living w/ brother, Maya Mccann and son, Penny Taveras and does have supportive dtr, Quiana Crane who lives locally   Prior Level of Function   Functional Cognition 3  Independent - Patient completed the activities by him/herself, with or without an assistive device, with no assistance from a helper  Prior Assistance Needed for Money Management   Restrictions/Precautions   Precautions Aspiration;Bed/chair alarms;Cognitive; Fall Risk;Pain;Supervision on toilet/commode   Pain Assessment   Pain Assessment Tool 0-10   Pain Score No Pain   Eating Assessment   Swallow Precautions Yes   Bedside Swallow Results Yes  (refer to SLP rehab note for full details)   VBS Study Results Yes   Food To Mouth Yes   Able To Cut Yes   Noted Coughing   Positioning Upright  (bed in chair mode)   Safety Needs Increase Time;Cues   Meal Assessed Breakfast   QI: Swallowing/Nutritional Status Therapeutic Diet   Current Diet Dysphia I;Thin   Intake Mode PO;Self   Finishes Timely Yes   Opens Packages No   Findings Pt presenting w/ moderate oropharyngeal dyspahgia at this time  Refer to SLP Rehb note for full details  Type of Assistance Needed Set-up / clean-up;Supervision;Verbal cues   Physical Assistance Level No physical assistance   Eating CARE Score 4   Comprehension   Assist Devices Glasses   Auditory Basic   Visual Basic   Findings Pt completing Motor Speech and Bedside Dysphagia assessments  Will benefit from completion of cognitive assessment  Refer to SLP Rehab note for full details  QI: Comprehension 3  Usually Understands: Understands most conversations, but misses some part/intent of message   Requires cues at "times to understand  Comprehension (FIM) 4 - Understands basic info/conversation 75-90% of time   Expression   Verbal Basic;Complex   Non-Verbal Basic;Complex   Intelligibility Word   Findings Pt completing Motor Speech and Bedside Dysphagia assessments  Will benefit from completion of cognitive assessment  Refer to SLP Rehab note for full details  QI: Expression 3  Exhibits some difficulty with expressing needs and ideas (e g , some words or finishing thoughts) or speech is not clear   Expression (FIM) 3 - Expresses basic info/needs 50-74% of time   Social Interaction   Cooperation with staff   Participation Individual   Behaviors observed Appropriate   Findings Pt completing Motor Speech and Bedside Dysphagia assessments  Will benefit from completion of cognitive assessment  Refer to SLP Rehab note for full details  Social Interaction (FIM) 5 - Interacts appropriately with others 90% of time   Problem Solving   Complex Manages discharge planning;Manages medications   Findings Pt completing Motor Speech and Bedside Dysphagia assessments  Will benefit from completion of cognitive assessment  Refer to SLP Rehab note for full details  Problem solving (FIM) 3 - Solves basic problmes 50-74% of time   Memory   Remember Routine No   Initiates Tasks Yes   Short-Term Impaired   Long Term Intact   Findings Pt completing Motor Speech and Bedside Dysphagia assessments  Will benefit from completion of cognitive assessment  Refer to SLP Rehab note for full details  Memory (FIM) 3 - Recognizes, recalls/performs 50-74%   Discharge Information   Vocational Plan Retired/not working   Patient's Discharge Plan home w/ family support/supervision   Patient's Rehab Expectations \"to get better foods soon\"   Barriers to Discharge Home Limited Family Support;Decreased Cognitive Function;Decreased Strength;Decreased Endurance;Pain; Safety Considerations  (aspiration risk, dysarthria)   Impressions Pt is a 74 y/o female presenting to " [facility] on 2/22/23 for altered metal status and found to have acute stroke in the R cerebral peduncle  PMH includes asthma, anxiety, bipolar depression, chronic narcotic use, previous enteroviral infection of the heart, seronegative arthropathy of multiple joints, undifferentiated connective tissue disorder, SHAR,and R MCA CVA w/ revascularization on 2/12/23  Pt is a good rehab candidate to achieve supervision to min A level cognitive linguistic skills, supervision to min A level for speech function and attempt to establish safest least restrictive diet while on the acute rehab center w/ anticipated discharge home w/ family support/supervision  Current barriers which present include, mild-moderate oropharyngeal dysphagia, aspiration risk, mild-moderate dysarthria, decreased attention, decreased ST/working memory, decreased attention, decreased executive functions (problem solving, reasoning, sequencing, organization, judgement), decreased insight to overall deficits which impacts safety in current diet advancement, cognitive and mobility skills  At this time, pt will benefit from skilled SLP services targeting speech clarity, maximizing cognitive linguistic skills as well as establishment of least restricitive diet w/o increasing pt's overall risk of aspiration in attempts to decrease overall caregiver burden at time of anticipated discharge home w/ family support/supervision     SLP Therapy Minutes   SLP Time In 0730   SLP Time Out 0830   SLP Total Time (minutes) 60   SLP Mode of treatment - Individual (minutes) 60   SLP Mode of treatment - Concurrent (minutes) 0   SLP Mode of treatment - Group (minutes) 0   SLP Mode of treatment - Co-treat (minutes) 0   SLP Mode of Treatment - Total time(minutes) 60 minutes   SLP Cumulative Minutes 60   Cumulative Minutes   Cumulative therapy minutes 60
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
## 2023-03-01 NOTE — PROGRESS NOTES
SLP Dysphagia and Dysarthria Assessments       03/01/23 0769   Pain Assessment   Pain Assessment Tool 0-10   Pain Score No Pain   Restrictions/Precautions   Precautions Aspiration;Bed/chair alarms;Cognitive; Fall Risk;Pain;Supervision on toilet/commode   Cognitive Linguisitic Assessments   [facility] Assessment - Geriatric (RIPA-G) Refer to below for full details  Comprehension   Assist Devices Glasses   Auditory Basic   Visual Basic   Findings Pt completing Motor Speech and Bedside Dysphagia assessments  Will benefit from completion of cognitive assessment  Refer to SLP Rehab note for full details  QI: Comprehension 3  Usually Understands: Understands most conversations, but misses some part/intent of message  Requires cues at times to understand  Comprehension (FIM) 4 - Understands basic info/conversation 75-90% of time   Expression   Verbal Basic;Complex   Non-Verbal Basic;Complex   Intelligibility Word   Findings Pt completing Motor Speech and Bedside Dysphagia assessments  Will benefit from completion of cognitive assessment  Refer to SLP Rehab note for full details  QI: Expression 3  Exhibits some difficulty with expressing needs and ideas (e g , some words or finishing thoughts) or speech is not clear   Expression (FIM) 3 - Expresses basic info/needs 50-74% of time   Social Interaction   Cooperation with staff   Participation Individual   Behaviors observed Appropriate   Findings Pt completing Motor Speech and Bedside Dysphagia assessments  Will benefit from completion of cognitive assessment  Refer to SLP Rehab note for full details  Social Interaction (FIM) 5 - Interacts appropriately with others 90% of time   Problem Solving   Complex Manages discharge planning;Manages medications   Findings Pt completing Motor Speech and Bedside Dysphagia assessments  Will benefit from completion of cognitive assessment  Refer to SLP Rehab note for full details     Problem solving (FIM) 3 - Solves basic problmes 50-74% of time   Memory   Remember Routine No   Initiates Tasks Yes   Short-Term Impaired   Long Term Intact   Findings Pt completing Motor Speech and Bedside Dysphagia assessments  Will benefit from completion of cognitive assessment  Refer to SLP Rehab note for full details  Memory (FIM) 3 - Recognizes, recalls/performs 50-74%   Motor Speech Evaluation   Dysarthria Yes   Moderate Dysarthria Impaired resp support and coordination with speech; Impaired phonation; Impaired volume; Impaired articulation; Impaired rate control; Impaired rate and rhythm   Intelligibility Intelligibility reduced   Intelligibility Rating Word level intelligibility; Sentence level intelligibility;Conversation level intelligibility;50%-74%;75%-89%   Speech/Language/Cognition Assessmetn   Treatment Assessment MOTOR SPEECH EVALUATION        Evaluation:    Vowel prolongation: /a/ (Normal 15-20 seconds)  5 25 seconds across 4 trials      Articulation:    Repetition of single words: 12/16, noting difficulty of producing final consonants on words containing the phonemes /p, sh, l, ch/    Repetition of multisyllabic words: 5/10, increased difficulty in medial sounds of words w/ /er, l/ and s-blends in various positions of ewords    Repetition of 3 words of progressive length: 10/15, observing significant decreased breath support for verbalizing words as they progressed    Count 1-20, then backwards from 20-1: again, noting decreased breath support primarily once verbalizing teen numbers being counted forward as well as backward      Oral Motor Skills:  Facial symmetry: L droop  Labial: L droop; decreased ROM/strength  Lingual: decreased ROM/strength  Voice: breathy voicing noted      Articulation in connected speech/Conversation:    Imprecise articulation  Decreased breath support for speech  Fast rate      Impressions:   At this time pt is demonstrating speech intelligibility to be moderately impaired at the "word/phrase/sentence/conversational level  Pt noting to elicit the following errors: imprecise articulation, decreased breath support for speech, fast rate which impacts overall clarity at the word, phrase, sentence and conversational speech level  Pt w/ inconsistent awareness given this throughout assessment today  At this time, pt will benefit from SLP services at this time to improve overall speech intelligibility while on the acute rehab center  Initiated basic cognitive linguistic assessment by reviewing orientation and LTM biographical recall  Pt was noted to be oriented to place/city, situation and current year, noting difficulty w/ month/date, stating \"\" but when SLP provided cue and well as pt looking to whiteboard, able to determine correct month/date  Pt was accurate in verbalizing current address,  and age as well as reported fair independence given home tasks including cleaning, laundry, driving and medication management  Pt reported that her brother, Robin Go recently moved in w/ her and her son, Pao Malik also moved back home as well  Pt also reports supportive dtr, Syed Junior who does complete pt's finances at this time  Throughout entire assessment, pt was noted to be mildly tangential, noting difficulty in timeline management for events which had occurred to pt in the past (more recent stroke dx transitioning to [facility] but also discussing other prior hospital admissions)  Due to observed events from completing dysphagia and Motor Speech assessment, completing a cognitive assessment will be beneficial to further determine targeted skills to maximize function while on the acute rehab center      Eating   Type of Assistance Needed Set-up / clean-up;Supervision;Verbal cues   Physical Assistance Level No physical assistance   Eating CARE Score 4   Swallow Assessment   Swallow Treatment Assessment Bedside Dysphagia Evaluation      Patient Name: Mariah BURRELLUSCOTT Date: 3/1/2023     Problem " List  Principal Problem:    Acute CVA (cerebrovascular accident) [facility])  Active Problems:    Chronic pain syndrome    Asthma    Seronegative [facility]ropathy [facility] sites ([facility] )    CHF (congestive heart failure) ([facility])    Osteoporosis    Bipolar depression ([facility])    HTN (hypertension)    Hypercalcemia    Discoloration of skin of foot    Bacteremia    Obstructive sleep apnea    History of stroke      Past Medical History  Past Medical History:   Diagnosis Date    Anxiety     Asthma     Bipolar depression ([facility])     Chronic narcotic dependence ([facility])     Chronic pain     Depression     Enterovirus heart infection     SHAR (obstructive sleep apnea)     Osteoarthritis     Peripheral neuropathy     Plantar fasciitis of right foot     Senile osteoporosis     Seronegative arthropathy of multiple sites ([facility] )     Undifferentiated connective tissue disease ([place] [address] )        Past Surgical History  Past Surgical History:   Procedure Laterality Date    BREAST IMPLANT      FIXATION KYPHOPLASTY LUMBAR SPINE      HYSTERECTOMY      INCONTINENCE SURGERY N/A     IR STROKE ALERT  2/12/2023    NASAL SEPTOPLASTY W/ TURBINOPLASTY N/A     RECTAL PROLAPSE REPAIR N/A     REPAIR RECTOCELE      RHINOPLASTY N/A          Summary   Pt presented with s/s suggestive of moderate oral and pharyngeal dysphagia  Symptoms or concerns included decreased bolus propulsion, decreased mastication, decreased bolus formation, delayed oral intiation, suspected decreased control of thins , piecemeal deglutition and anterior loss/spillage of both puree and thins suspected pharyngeal swallow delay, suspected decreased hyolaryngeal elevation upon palpation, suspected pharyngeal residue, multiple swallows, effortful swallow, audible swallows and weak cough response after thins  Lip seal around fork and cup was mildly decreased to the L side, which lead to trace to minimal anterior loss given puree and more noted w/ thin liquids   Mastication/manipulation given puree items was noted to be mild-moderately effortful and prolonged across boluses (small vs larger)  Overall bolus formation given puree was noted to be functional with no significant oral residue noted  However, transfer of puree was noted to be moderately delayed  Bolus control given thin liquids by cup was noted to be inconsistent given sip size  Swallowing initiation was noted to be moderately delayed w/ puree items and mildly delayed w/ thins  Laryngeal rise was palpated and judged to be decreased and likely suspecting decreased pharyngeal constriction during swallow which likely leads to pharyngeal residual  Pt would consistently demonstrate double swallows given both puree and thin boluses  Additionally observed is highly audible swallows w/ thins > puree  Pt did exhibit weaker cough x2 after thins and throat clear x1 after thins  Question overall effectiveness given cough/throat clearing  Risk/s for Aspiration: mild-moderate d/t positioning, attention to task of meal      Recommendations:  Recommended Diet: puree/level 1 diet and thin liquids   Recommended Form of Meds: whole with puree   Aspiration precautions and swallowing strategies: upright posture, only feed when fully alert, slow rate of feeding, small bites/sips, no straws, effortful swallow, cough every 5 bites/sips, quiet environment (tv off, limit talking, door closed, etc ), alternating bites and sips and OOB preferred for ALL meals, use of lingual sweeps to L buccal cavity as needed      DISTANT supervision w/ meals  Results reviewed with:  patient, Maddie Ramirez MD, PT/OT   Aspiration precautions posted  F/u ST tx: Pt will continue to benefit from ongoing skilled dysphagia tx sessions to establish safest least restrictive diet w/o increased oropharyngeal or aspiration sxs as well as monitor ability to carryover swallow strategies independently      Plan: Initiate level 2 food trials under SLP supervision ONLY           Trial thin liquids w/ straw sips            If increased signs/sxs of aspiration increased as dysphagia tx sessions progress, consider repeat VFSS to determine current swallow function             And least restrictive diet          Current Medical Status  Pt is a 75 y o  female with history of asthma, anxiety, bipolar depression, chronic narcotic use, previous enteroviral infection of the heart, seronegative arthropathy of multiple joints, undifferentiated connective tissue disorder, obstructive sleep apnea on home CPAP who presented to the [address] [place] [address] on 2/22 for altered mental status  According to the patient's daughter on chart review, the patient's mental status had been declining for the prior 48 hours to admission  She did receive Narcan which improved her mental status slightly  Of note she had a recent stroke with revascularization on 2/12/2023 with Dr Zahra Yun  She was seen by neurology with overall good strength except mildly weak in the lower extremities with brisk reflexes on the left and a positive Haroon's  At this time is felt to be potentially related to polypharmacy in the setting of chronic pain meds versus seizure in the setting of a recent stroke  She was initiated on 81 mg of aspirin daily with a plan of potentially transitioning to Eliquis, plan for EEG  CT of the head was negative  MRI was completed showing a new small acute/recent interval lacunar infarction in the right cerebral pedun there is jacinta  She was started on Eliquis  Nephrology was consulted as well for hypercalcemia  The patient was evaluated by the Rehabilitation team and deemed an appropriate candidate for comprehensive inpatient rehabilitation and admitted to the [facility] on 2/28/2023    Allergies:  No known food allergies    Past medical history:  Please see H&P for details    Special Studies:  Previous VBS and LVH:   VBS 5/26/2021:    Overall there was prominent cricopharyngeal impression     Thin liquids by cup: Aspiration with cough   Thin liquids by cup, small sip with oral prep: Transient penetration x1 and   silent aspiration x1   Nectar consistency by cup: Transient penetration   Puree: Transient penetration  Mild vallecular and piriform sinus retention   Mixed consistency: Silent aspiration of fluid  Moderate vallecular retention of   fruit  Mixed consistency, small-volume: Transient penetration and vallecular retention   Nectar wash by cup: Silent aspiration of retained fluid  Nectar consistency by cup: Transient penetration   IMPRESSION:   IMPRESSION: Episodes of aspiration and penetration as above  Please see speech   pathology report for recommendations  VBS 10/30/2018:   Deep penetration to the level of the cords with thin liquids  Mild to moderate   penetration, the bedside is seen with nectar consistency  Transient penetration   with the liquid component of mixed solid consistency  Please see detailed speech   and swallow report  Social/Education/Vocational Hx:  Pt lives brother and son    Swallow Information   Current Risks for Dysphagia & Aspiration: Weak voicing; weak cough; dysarthria; general debilitation; new neuro event; brain injury; cognitive deficit; hx neurologic dx; positioning issues  Current Symptoms/Concerns: Cough; throat clear; with liquids; during meals; with pills; difficulty chewing; decreased oral intake; hx of dysphagia/aspiration  Current Diet: puree/level 1 diet and thin liquids   Baseline Diet: upon d/c from prior admission, pt appeared to be on dysphagia level 2/thin liquids upon prior admission for stroke initially; unsure of diet prior to admission in general due to pt reporting being on softer foods as well as thickened liquids in the past  Will confirm with family in future sessions        Baseline Assessment   Behavior/Cognition: alert  Speech/Language Status: able to participate in conversation and able to follow commands  Patient Positioning: Bed placed in chair mode for meal  Pain Status/Interventions/Response to Interventions:   No report of or nonverbal indications of pain  Swallow Mechanism Exam  Facial: left facial droop  Labial: decreased ROM left side, decreased strength and decreased coordination  Lingual: decreased ROM and decreased coordination  Velum: unable to visualize  Mandible:  decreased ROM  Dentition: adequate  Vocal quality:breathy and weak   Volitional Cough: weak   Respiratory Status: on 2L O2  Via NC which pt reports as being new      Consistencies Assessed and Total Amount Consumed:  Consistencies Administered: thin liquids and puree  Materials administered included : pureed Kiswahili toast, thin liquids by cup      Oral Stage: moderate  Lip seal around fork and cup was mildly decreased to the L side, which lead to trace to minimal anterior loss given puree and more noted w/ thin liquids  Mastication/manipulation given puree items was noted to be mild-moderately effortful and prolonged across boluses (small vs larger)  Overall bolus formation given puree was noted to be functional with no significant oral residue noted  However, transfer of puree was noted to be moderately delayed  Bolus control given thin liquids by cup was noted to be inconsistent given sip size  Pharyngeal Stage: moderate  Swallowing initiation was noted to be moderately delayed w/ puree items and mildly delayed w/ thins  Laryngeal rise was palpated and judged to be decreased and likely suspecting decreased pharyngeal constriction during swallow which likely leads to pharyngeal residual  Pt would consistently demonstrate double swallows given both puree and thin boluses  Additionally observed is highly audible swallows w/ thins > puree  Pt did exhibit weaker cough x2 after thins and throat clear x1 after thins  Question overall effectiveness given cough/throat clearing  Esophageal Concerns: None observed at this time      Strategies and Efficacy: double swallows, small sips, alternating solids/liquids    Summary and Recommendations (see above)     Swallow Assessment Prognosis   Prognosis Fair   Prognosis Considerations Age; Co-morbidities; Family/community support;Medical diagnosis; Medical prognosis;Previous level of function;Severity of impairments;New learning ability;Ability to carry over   SLP Therapy Minutes   SLP Time In 0730   SLP Time Out 0830   SLP Total Time (minutes) 60   SLP Mode of treatment - Individual (minutes) 60   SLP Mode of treatment - Concurrent (minutes) 0   SLP Mode of treatment - Group (minutes) 0   SLP Mode of treatment - Co-treat (minutes) 0   SLP Mode of Treatment - Total time(minutes) 60 minutes   SLP Cumulative Minutes 60   Therapy Time missed   Time missed?  No

## 2023-03-01 NOTE — PLAN OF CARE
Problem: PAIN - ADULT  Goal: Verbalizes/displays adequate comfort level or baseline comfort level  Description: Interventions:  - Encourage patient to monitor pain and request assistance  - Assess pain using appropriate pain scale  - Administer analgesics based on type and severity of pain and evaluate response  - Implement non-pharmacological measures as appropriate and evaluate response  - Consider cultural and social influences on pain and pain management  - Notify physician/advanced practitioner if interventions unsuccessful or patient reports new pain  Outcome: Progressing     Problem: INFECTION - ADULT  Goal: Absence or prevention of progression during hospitalization  Description: INTERVENTIONS:  - Assess and monitor for signs and symptoms of infection  - Monitor lab/diagnostic results  - Monitor all insertion sites, i e  indwelling lines, tubes, and drains  - Monitor endotracheal if appropriate and nasal secretions for changes in amount and color  - Margie appropriate cooling/warming therapies per order  - Administer medications as ordered  - Instruct and encourage patient and family to use good hand hygiene technique  - Identify and instruct in appropriate isolation precautions for identified infection/condition  Outcome: Progressing  Goal: Absence of fever/infection during neutropenic period  Description: INTERVENTIONS:  - Monitor WBC    Outcome: Progressing     Problem: SAFETY ADULT  Goal: Patient will remain free of falls  Description: INTERVENTIONS:  - Educate patient/family on patient safety including physical limitations  - Instruct patient to call for assistance with activity   - Consult OT/PT to assist with strengthening/mobility   - Keep Call bell within reach  - Keep bed low and locked with side rails adjusted as appropriate  - Keep care items and personal belongings within reach  - Initiate and maintain comfort rounds  - Make Fall Risk Sign visible to staff  - Offer Toileting every 2 Hours, in advance of need  - Initiate/Maintain bed  chair alarm  - Obtain necessary fall risk management equipment: nonskid socks  - Apply yellow socks and bracelet for high fall risk patients  - Consider moving patient to room near nurses station  Outcome: Progressing  Goal: Maintain or return to baseline ADL function  Description: INTERVENTIONS:  -  Assess patient's ability to carry out ADLs; assess patient's baseline for ADL function and identify physical deficits which impact ability to perform ADLs (bathing, care of mouth/teeth, toileting, grooming, dressing, etc )  - Assess/evaluate cause of self-care deficits   - Assess range of motion  - Assess patient's mobility; develop plan if impaired  - Assess patient's need for assistive devices and provide as appropriate  - Encourage maximum independence but intervene and supervise when necessary  - Involve family in performance of ADLs  - Assess for home care needs following discharge   - Consider OT consult to assist with ADL evaluation and planning for discharge  - Provide patient education as appropriate  Outcome: Progressing  Goal: Maintains/Returns to pre admission functional level  Description: INTERVENTIONS:  - Perform BMAT or MOVE assessment daily    - Set and communicate daily mobility goal to care team and patient/family/caregiver  - Collaborate with rehabilitation services on mobility goals if consulted  - Perform Range of Motion 3 times a day  - Reposition patient every 2 hours    - Dangle patient 3 times a day  - Stand patient 3 times a day  - Ambulate patient 3 times a day  - Out of bed to chair 3 times a day   - Out of bed for meals 3 times a day  - Out of bed for toileting  - Record patient progress and toleration of activity level   Outcome: Progressing     Problem: DISCHARGE PLANNING  Goal: Discharge to home or other facility with appropriate resources  Description: INTERVENTIONS:  - Identify barriers to discharge w/patient and caregiver  - Arrange for needed discharge resources and transportation as appropriate  - Identify discharge learning needs (meds, wound care, etc )  - Arrange for interpretive services to assist at discharge as needed  - Refer to Case Management Department for coordinating discharge planning if the patient needs post-hospital services based on physician/advanced practitioner order or complex needs related to functional status, cognitive ability, or social support system  Outcome: Progressing     Problem: Nutrition/Hydration-ADULT  Goal: Nutrient/Hydration intake appropriate for improving, restoring or maintaining nutritional needs  Description: Monitor and assess patient's nutrition/hydration status for malnutrition  Collaborate with interdisciplinary team and initiate plan and interventions as ordered  Monitor patient's weight and dietary intake as ordered or per policy  Utilize nutrition screening tool and intervene as necessary  Determine patient's food preferences and provide high-protein, high-caloric foods as appropriate       INTERVENTIONS:  - Monitor oral intake, urinary output, labs, and treatment plans  - Assess nutrition and hydration status and recommend course of action  - Evaluate amount of meals eaten  - Assist patient with eating if necessary   - Allow adequate time for meals  - Recommend/ encourage appropriate diets, oral nutritional supplements, and vitamin/mineral supplements  - Order, calculate, and assess calorie counts as needed  - Recommend, monitor, and adjust tube feedings and TPN/PPN based on assessed needs  - Assess need for intravenous fluids  - Provide specific nutrition/hydration education as appropriate  - Include patient/family/caregiver in decisions related to nutrition  Outcome: Progressing     Problem: Nutrition/Hydration-ADULT  Goal: Nutrient/Hydration intake appropriate for improving, restoring or maintaining nutritional needs  Description: Monitor and assess patient's nutrition/hydration status for malnutrition  Collaborate with interdisciplinary team and initiate plan and interventions as ordered  Monitor patient's weight and dietary intake as ordered or per policy  Utilize nutrition screening tool and intervene as necessary  Determine patient's food preferences and provide high-protein, high-caloric foods as appropriate       INTERVENTIONS:  - Monitor oral intake, urinary output, labs, and treatment plans  - Assess nutrition and hydration status and recommend course of action  - Evaluate amount of meals eaten  - Assist patient with eating if necessary   - Allow adequate time for meals  - Recommend/ encourage appropriate diets, oral nutritional supplements, and vitamin/mineral supplements  - Order, calculate, and assess calorie counts as needed  - Recommend, monitor, and adjust tube feedings and TPN/PPN based on assessed needs  - Assess need for intravenous fluids  - Provide specific nutrition/hydration education as appropriate  - Include patient/family/caregiver in decisions related to nutrition  Outcome: Progressing     Problem: Prexisting or High Potential for Compromised Skin Integrity  Goal: Skin integrity is maintained or improved  Description: INTERVENTIONS:  - Identify patients at risk for skin breakdown  - Assess and monitor skin integrity  - Assess and monitor nutrition and hydration status  - Monitor labs   - Assess for incontinence   - Turn and reposition patient  - Assist with mobility/ambulation  - Relieve pressure over bony prominences  - Avoid friction and shearing  - Provide appropriate hygiene as needed including keeping skin clean and dry  - Evaluate need for skin moisturizer/barrier cream  - Collaborate with interdisciplinary team   - Patient/family teaching  - Consider wound care consult   Outcome: Progressing     Problem: MOBILITY - ADULT  Goal: Maintain or return to baseline ADL function  Description: INTERVENTIONS:  -  Assess patient's ability to carry out ADLs; assess patient's baseline for ADL function and identify physical deficits which impact ability to perform ADLs (bathing, care of mouth/teeth, toileting, grooming, dressing, etc )  - Assess/evaluate cause of self-care deficits   - Assess range of motion  - Assess patient's mobility; develop plan if impaired  - Assess patient's need for assistive devices and provide as appropriate  - Encourage maximum independence but intervene and supervise when necessary  - Involve family in performance of ADLs  - Assess for home care needs following discharge   - Consider OT consult to assist with ADL evaluation and planning for discharge  - Provide patient education as appropriate  Outcome: Progressing  Goal: Maintains/Returns to pre admission functional level  Description: INTERVENTIONS:  - Perform BMAT or MOVE assessment daily    - Set and communicate daily mobility goal to care team and patient/family/caregiver  - Collaborate with rehabilitation services on mobility goals if consulted  - Perform Range of Motion 3 times a day  - Reposition patient every 2 hours    - Dangle patient 3 times a day  - Stand patient 3 times a day  - Ambulate patient 3 times a day  - Out of bed to chair 3 times a day   - Out of bed for meals 3 times a day  - Out of bed for toileting  - Record patient progress and toleration of activity level   Outcome: Progressing

## 2023-03-01 NOTE — PROGRESS NOTES
OT LTGs     03/01/23 1000   Rehab Team Goals   ADL Team Goal Patient will require supervision with ADLs with least restrictive device upon completion of rehab program   Rehab Team Interventions   OT Interventions Self Care;Home Management; Therapeutic Exercise;Cognitive Reintegration;Cognitive Retraining;Energy Conservation;Patient/Family Education   Eating Goal   Eating Goal 05  Setup or clean-up assistance - [place] SETS UP or CLEANS UP, patient completes activity  [place] assists only prior to or following the activity  Meal Complete All meals   Status Target goal - three weeks   Interventions Optimal Position; Neuromuscular Education;Dysphagia Education;Assistive Device   Grooming Goal   Oral Hygiene Goal 05  Setup or clean-up assistance - [place] SETS UP or CLEANS UP, patient completes activity  [place] assists only prior to or following the activity  Task Wash/Dry Face;Wash/Dry Hands;Brush Teeth;Comb Hair;Acquire Items; Initiate Task;Complete Groom   Environment Unsupported sit; Seated in Chair;Seated at Sink;Stand at [facility]   Status Target goal - three weeks   Intervention Assistive Device;Balance Work;Neuromuscular Education; Therapeutic Exercise; Tolerance Work   Tub/Shower Transfer Goal   Method Shower Stall  (GOAL: CGA)   Assist Device Seat with Back;Grab Bar;Hand Held Shower   Status Target goal - three weeks   Interventions ADL Training;Assistive Device; Neuromuscular Education   Bathing Goal   Shower/bathe self Goal 04  Supervision or touching assistance- [place] provides VERBAL CUES or supervision throughout activity  Environment Seated;Standing;Sponge Bath; Shower   Adaptive Equipment Seat with back;Grab [employer]; Long Handle Sponge   Status Target goal - three weeks   Intervention Assistive Device; ADL Training;Neuromuscular Education; Therapeutic Exercise   Upper Body Dressing Goal   Upper body dressing Goal 05  Setup or clean-up assistance - [place] SETS UP or CLEANS UP, patient completes activity  [place] assists only prior to or following the activity  Task Upper Body;Arms in/out; Over Head   Environment Seated   Status Target goal - three weeks   Intervention Assistive Device;Balance Work;Tolerance Work; Therapeutic Exercise;Neuromuscular Education   Lower Body Dressing Goal   Lower body dressing Goal 04  Supervision or touching assistance- [place] provides VERBAL CUES or supervision throughout activity  Putting on/taking off footwear Goal 04  Supervision or touching assistance- [place] provides VERBAL CUES or supervision throughout activity  Task Lower Body;Shoe/Slipper;Socks;Pants; Undergarment; Fasteners   Adaptive Equipment Elastic Laces;Dressing Stick; Reacher;Sock Aide; Shoe [employer] Standing;Seated   Status Target goal - three weeks   Intervention Assistive Device;Balance Work;Neuromuscular Education; Therapeutic Exercise; Tolerance Work   Toileting Transfer Goal   Toilet transfer Goal 05  Setup or clean-up assistance - [place] SETS UP or CLEANS UP, patient completes activity  [place] assists only prior to or following the activity  Assistive Device Grab [employer]; Wheelchair;Bedside Commode;Raised Toilet Seat   Status Target goal - three weeks   Intervention Assistive Device;Balance Work;ADL Training   Toileting Goal   Toileting hygiene Goal 05  Setup or clean-up assistance - [place] SETS UP or CLEANS UP, patient completes activity  [place] assists only prior to or following the activity     Task Pants Up;Pants Down;Hygiene   Safety Use a Bedside Commode during day;Use a Bedside Commode at Night;Balance   Status Target goal - three weeks   Intervention Assistive Device;Balance Work;ADL Training   Meal Prep and Kitchen Mobility   Assist Level Minimum Assist  (light meal prep)   Status Target goal - three weeks   Medication Management   Assist Level Minimum Assist   Status Target goal - three weeks     La Gal[place] MS, OTR/L

## 2023-03-01 NOTE — TREATMENT TEAM
Renal short note-    Spoke with primary team attending for [facility] unit  From renal standpoint, would not recommend intervention for hypercalcemia unless calcium starts to rise above 11  Primary team will follow BMP and will contact nephrology team back if needed  Patient needs endocrine follow-up for evaluation for hyperparathyroidism    I did review with the [facility] unit attending Dr Luis Linares PTH RP is also pending   - Primary team attending will discontinue renal consultation for now but we are available anytime if questions or issues arise to be called back  -Thank you

## 2023-03-01 NOTE — PROGRESS NOTES
Internal Medicine Progress Note  Patient: Eli Rao  Age/sex: 75 y o  female  Medical Record #: [id number]      ASSESSMENT/PLAN: (Interval History)  Eli Rao is seen and examined and management for following issues:    Recent right MCA infarct W/petechial hemorrhage  • S/P thrombectomy by Dr Ana Rosa Torres 2/12  • Continue Eliquis 5 mg twice daily/statin  • Follow-up with neurosurgery/neurology as scheduled     N ICM  • EF has been as low as 10 to 15% in the past  • In 2022 was 65%  • Now down to 30%  • Cardiology felt this was secondary to her recent CVA as well as possible medication noncompliance  • They recommend continuing beta-blocker and adding ARB when BP allows  • Will need outpatient ischemia work-up  • Followed by Dr Jayashree Sarmiento at [facility] however would like to follow with [facility] on dc     Seronegative rheumatoid arthritis  • A/T rheumatology takes Plaquenil 200 mg Monday through Friday 400 mg on Saturday and Sunday  • Continue same  • Follow-up on discharge     Chronic pain  • Due to compression fractures of her thoracic spine/chronic rib fractures  • Takes MS Contin 15 mg every 12 hours  • Has oxycodone 5 mg immediate release for breakthrough pain  • According to the patient she takes only 1 dose of breakthrough oxycodone per day  • Continue same  • Pain management per PMR     Bipolar disorder  • Continue current medication regimen  • Follow-up with psychiatry on discharge  • Recommend Neuropsych consult    Urinary incontinence  · toileting regimen per PMR     SHAR  • Uses oxygen overnight      DC planning: TBD  The above assessment and plan was reviewed and updated as determined by my evaluation of the patient on 3/1/2023      Labs:   Results from last 7 days   Lab Units 02/27/23  0938 02/24/23  0422   WBC Thousand/uL 7 03 7 20   HEMOGLOBIN g/dL 13 9 13 8   HEMATOCRIT % 42 2 40 9   PLATELETS Thousands/uL 261 240     Results from last 7 days   Lab Units 02/28/23  0519 02/27/23  8960 SODIUM mmol/L 142 138   POTASSIUM mmol/L 4 2 4 6   CHLORIDE mmol/L 111* 110*   CO2 mmol/L 26 28   BUN mg/dL 15 14   CREATININE mg/dL 0 83 0 78   CALCIUM mg/dL 10 6* 10 9*         Results from last 7 days   Lab Units 02/22/23  1400   INR  0 95           Review of Scheduled Meds:  Current Facility-Administered Medications   Medication Dose Route Frequency Provider Last Rate   • acetaminophen  650 mg Oral Q6H PRN [place] Wilton, DO     • albuterol  2 puff Inhalation Q6H PRN [place] Wilton, DO     • aluminum-magnesium hydroxide-simethicone  30 mL Oral Q4H PRN [place] Wilton, DO     • apixaban  5 mg Oral BID [place] Wilton, DO     • atorvastatin  40 mg Oral QPM [place] Wilton, DO     • buPROPion  100 mg Oral BID [place] Wilton, DO     • DULoxetine  60 mg Oral BID [place] Wilton, DO     • gabapentin  300 mg Oral BID [place] Wilton, DO     • glycerin-hypromellose-  1 drop Both Eyes Q4H PRN [place] Wilton, DO     • hydroxychloroquine  200 mg Oral Once per day on Mon Tue Wed Thu Fri Kat Wilton, DO     • [START ON 3/4/2023] hydroxychloroquine  400 mg Oral Once per day on Sun Sat [place] Wilton, DO     • lamoTRIgine  150 mg Oral HS [place] Wilton, DO     • metoprolol succinate  25 mg Oral BID [place] Wilton, DO     • morphine  15 mg Oral BID [place] Wilton, DO     • ondansetron  4 mg Intravenous Q6H PRN [place] Wilton, DO     • polyethylene glycol  17 g Oral Daily PRN [place] Wilton, DO     • senna-docusate sodium  1 tablet Oral BID [place] Wilton, DO         Subjective/ HPI: Patient seen and examined  Patients overnight issues or events were reviewed with nursing or staff during rounds or morning huddle session  New or overnight issues include the following:     Pt seen at bedside with her daughter  Slept some portion of the night  Pt and daughter are concerned regarding the use of the pure wick, they feel that when she was on acute side she developed a UTI from same   I did tell her that this is an external device therefore risk "of infection is low  Daughter also wants us to know that 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  She would also like to follow up with both cardiology and neurology here at [facility]  ROS:   A 10 point ROS was performed; negative except as noted above  Imaging:     No orders to display       *Labs /Radiology studies Reviewed  *Medications  reviewed and reconciled as needed  *Please refer to order section for additional ordered labs studies  *Case discussed with primary attending during morning huddle case rounds    Physical Examination:  Vitals:   Vitals:    02/28/23 1500 02/28/23 2005 03/01/23 0519 03/01/23 0911   BP: 103/64 115/74 122/64 111/52   BP Location: Right arm Left arm Left arm    Pulse: 96 (!) 108 (!) 108 101   Resp: 16 20 18    Temp: 97 8 °F (36 6 °C) 97 7 °F (36 5 °C) 97 9 °F (36 6 °C)    TempSrc: Oral Axillary Oral    SpO2: 94% 97% 97%    Weight: 61 2 kg (134 lb 14 7 oz)  57 6 kg (126 lb 15 8 oz)    Height: 5' 3\" (1 6 m)          GEN: No apparent distress, interactive  NEURO: Alert and oriented x3; dysarthria, cognitive impairment  HEENT: Pupils are equal and reactive, EOMI, mucous membranes are moist, face asymmetrical  CV: S1 S2 regular, no MRG, no peripheral edema noted  RESP: Lungs are clear bilaterally, no wheezes, rales or rhonchi noted, on room air, respirations easy and non labored  GI: Flat, soft non tender, non distended; +BS x4  : Voiding without difficulty  MUSC: Moves all extremities; left hemiparesis  SKIN: pink, warm and dry, normal turgor, no rashes, lesions      The above physical exam was reviewed and updated as determined by my evaluation of the patient on 3/1/2023  Invasive Devices     Peripheral Intravenous Line  Duration           Peripheral IV 02/26/23 Dorsal (posterior); Right Forearm 2 days          Drain  Duration           External Urinary Catheter <1 day                   VTE Pharmacologic Prophylaxis: Eliquis  Code Status: Level 1 - Full " Code  Current Length of Stay: 1 day(s)      Total time spent:  30 minutes with more than 50% spent counseling/coordinating care  Counseling includes discussion with patient re: progress  and discussion with patient of his/her current medical state/information  Coordination of patient's care was performed in conjunction with primary service  Time invested included review of patient's labs, vitals, and management of their comorbidities with continued monitoring  In addition, this patient was discussed with medical team including physician and advanced extenders  The care of the patient was extensively discussed and appropriate treatment plan was formulated unique for this patient  Medical decision making for the day was made by supervising physician unless otherwise noted in their attestation statement  ** Please Note:  voice to text software may have been used in the creation of this document   Although proof errors in transcription or interpretation are a potential of such software**

## 2023-03-01 NOTE — TREATMENT PLAN
Individualized Plan of [address] [facility] 75 y o  female MRN: [id number]  Unit/Bed#: -01 Encounter: [id number]     PATIENT INFORMATION  ADMISSION DATE: 2/28/2023  3:41 PM JACKSON CATEGORY:Stroke:  01 1  Left Body Involvement (Right Brain)   ADMISSION DIAGNOSIS: Acute right MCA stroke ([address] ) [I63 511]  CVA (cerebral vascular accident) ([address] ) [I63 9]  EXPECTED LOS: 2 weeks     MEDICAL/FUNCTIONAL PROGNOSIS  Based on my assessment of the patient's medical conditions and current functional status, the prognosis for attaining medical and functional goals or the IRF stay is:  Good    Medical Goals: Patient will be medically stable for discharge to [facility] [place] upon completion of rehab program and Patient will be able to manage medical conditions and comorbid conditions with medications and follow up upon completion of rehab program    CHF/Cardiopulmonary function management: Ensure cardiopulmonary stability and optimize cardiopulmonary function not only at rest but with activity as patient's activity level significantly increases in acute rehab compared with prior to transfer in preparation for safe discharge from [facility]  Must closely and frequently monitor blood pressure and HR and when indicated monitor urinary output and weights with adjustments in diuretics and fluid management to ensure adequate cardiac output during ADLs and ambulation  Patient is at increased risk for orthostatic hypotension/syncope and potential injury if not monitored for and managed adequately  Blood pressure management:    Frequent monitoring of blood pressure with appropriate adjustments in blood pressure medication management to optimize blood pressure control and prevent/limit renal complications  Monitoring impact of blood pressure and side-effects of blood pressure medications at rest and with activity    Hypoxia prevention: Ensure appropriate level of oxygenation at rest and with activity to avoid symptomatic hypoxia, maximize functional performance, and decrease risk of atelectasis/pneumonia through close and frequent monitoring, providing appropriate respiratory treatments (such as incentive spirometry), and when necessary provide/adjust respiratory medications  Pain management:  Pain will improve with frequent evaluation of pain, careful adjustments in medications, frequent re-evaluation of patient's pain and medical/neurologic status to ensure optimal pain control, avoidance of potential serious and even life-threatening side-effects and drug interactions, as well as weaning pain medications as soon as possible to decrease risk of short and long-term use  Stroke with hemiparesis:  Intensive skilled therapies with physical therapy and occupational therapy with close oversight and management by rehab specialized physician in acute rehabilitation setting to most expeditiously and effectively improve functional mobility, transfers, upper and lower body strengthening, conditioning, balance, and gait training with appropriate assistive device  Patient will have optimal blood pressure management and blood sugar control  Prevent/decrease risk of VTE, atelectasis, pneumonia, skin ulceration, fall, other injury (and when applicable shoulder subluxation, chronic shoulder pain, plantarflexion contracture)  Cognitive deficits following cerebrovascular disease: intensive skilled therapies including speech language pathology to evaluate and treat deficits in cognition  Close oversight and management by rehab specialized physician of cognitive deficits and potential confounding factors (prevention of, monitoring for, and if found treatment of possible complications such as infections, as well as optimally managing sleep, mood, and meications which can negatively impact cognition and functional recovery)      Dysphagia: improve swallowing through SLP evaluation with intensive treatments, optimal nutrition, and fluid intake  Adjust diet and swallowing precautions as indicated to decrease risk of aspiration pneumonia and respiratory distress  Close oversight and management by rehab specialized physician  Inpatient rehabilitation education/teaching: To be provided to patient and typically family/caregiver (if able to be identified) by all skilled therapists, rehab nursing, case management, and rehab specialized physician to ensure optimal recovery and decrease risks of complications in both acute rehabilitation setting as well as after discharge  Anxiety (and/or Depression): Patient's mood and it's impact on therapy participation and functional recovery will improve during course with supportive counseling, relaxation/breathing techniques and if necessary medication management  Requires frequent re-assessment and close management to ensure anxiety/depression management during acute rehab course with planning for appropriate outpatient management to ensure optimal mental health and functional recovery  Electrolyte abnormality: hypercalcemia:  placing patient at risk for additional complications which may impact medical stability and functional recovery  Frequent measurement and monitoring of labs by with appropriate adjustments in medication and/or fluid management by rehabilitation physician and internal medicine consultant  Bladder dysfunction:  Appropriate bladder management with appropriate toileting program from rehab nursing and staff with oversight management by rehabilitation physicians which include appropriate monitoring and possible adjustments in medications to with goals to optimize bladder function and decrease risk of bladder retention, incontinence, and urinary tract infection     Bowel dysfunction: Appropriate bowel management with appropriate toileting program from rehab nursing and staff with oversight management by rehabilitation physicians which include adjustments in medications to optimize appropriate bowel function and prevent/decrease risk of constipation and bowel obstruction  ANTICIPATED DISCHARGE DISPOSITION AND SERVICES  COMMUNITY SETTING: Home - independent/modified independent    ANTICIPATED FOLLOW-UP SERVICE:   Outpatient Therapy Services: PT, OT and SLP      DISCIPLINE SPECIFIC PLANS:  Required Disciplines & Services: Rehabillitation Nursing, Case Management and Psychology    REQUIRED THERAPY:  Therapy Hours per Day Days per Week Total Days   Physical Therapy 1 5-6 10-14   Occupational Therapy 1 5-6 10-14   Speech/Language Therapy 1 3-5 10-14   NOTE: Additional therapy time(s) or changes to allocation of therapies as appropriate to meet patient needs and to achieve functional goals        Patient will participate in above therapy regimen consisting of PT, OT and SLP due to the following medical procedure/condition:Stroke:  01 1  Left Body Involvement (Right Brain)    ANTICIPATED FUNCTIONAL OUTCOMES:  ADL: Patient will require supervision with ADLs with least restrictive device upon completion of rehab program   Bladder/Bowel:  Modified independent   Transfers:   Modified independent   Locomotion:   Modified independent-CGA   Cognitive:  Supervision-independent     DISCHARGE PLANNING NEEDS  Equipment needs: Discharge needs to be reviewed with team      REHAB ANTICIPATED PARTICIPATION RESTRICTIONS:  Assist with Bathing Shower, Assist with Bathing in Tub, Assist with Mobility, Decreaed Safety Awareness, Inability to Drive, [address] with ADLS, Requires Assit with Homemaking, Requires Assit with Steps and Requires modified diet      Elayne Camacho,   Physical Medicine and Priyanka

## 2023-03-01 NOTE — PROGRESS NOTES
PM&R PROGRESS NOTE:  Daly Ghosh 75 y o  female MRN: [id number]  Unit/Bed#: -01 Encounter: [id number]        Rehabilitation Diagnosis: Impairment of mobility, safety, Activities of Daily Living (ADLs), and cognitive/communication skills due to Stroke:  01 1  Left Body Involvement (Right Brain)    HPI: Daly Ghosh is a 75 y o  female with history of asthma, anxiety, bipolar depression, chronic narcotic use, previous enteroviral infection of the heart, seronegative arthropathy of multiple joints, undifferentiated connective tissue disorder, obstructive sleep apnea on home CPAP who presented to the [facility] on 2/22 for altered mental status  According to the patient's daughter on chart review, the patient's mental status had been declining for the prior 48 hours to admission  She did receive Narcan which improved her mental status slightly  Of note she had a recent stroke with revascularization on 2/12/2023 with Dr Daksha Hair  She was seen by neurology with overall good strength except mildly weak in the lower extremities with brisk reflexes on the left and a positive Haroon's  At this time is felt to be potentially related to polypharmacy in the setting of chronic pain meds versus seizure in the setting of a recent stroke  She was initiated on 81 mg of aspirin daily with a plan of potentially transitioning to Eliquis, plan for EEG  CT of the head was negative  MRI was completed showing a new small acute/recent interval lacunar infarction in the right cerebral pedun there is jacinta  She was started on Eliquis  Nephrology was consulted as well for hypercalcemia  The patient was evaluated by the Rehabilitation team and deemed an appropriate candidate for comprehensive inpatient rehabilitation and admitted to the [facility] on 2/28/2023  3:41 PM patient was seen in room with brother at bedside and all questions regarding the acute inpatient rehabilitation program were answered  We also discussed at length course in the hospital and her last hospitalization to verify events and clarify any imaging or laboratory findings  Patient is eager to start her therapy program       SUBJECTIVE: Patient seen face to face  For therapy evaluations today  Overnight events per nursing without the CPAP was not ordered  On review of the order history the CPAP was ordered with an FiO2 of 30% however per conversation with the respiratory therapist the patient had declined use of the CPAP  Reinforced the benefits of using the CPAP overnight with the patient and continued recommended to use and provided education appropriately  Otherwise she is starting her therapies today and is fairly excited  Patient denies fever, chills, nausea, emesis, cough, shortness of breath, diarrhea, or constipation  Sleep was fine, mood stable  Pain is controlled at this time however was asking nursing why she is not getting her oxycodone as needed  Per review of records yesterday and today patient has not been on as needed oxycodone during this admission in the acute care setting as it was one of the potential inciting events for her altered mental status that brought her into the hospital initially  Goal is to avoid polypharmacy as best as possible given her current condition and risks  ASSESSMENT: Stable, progressing      PLAN:    Rehabilitation  Functional deficits:  Self care and mobility  Continue current rehabilitation plan of care to maximize function  Functional update:   PT: Eric  OT: Eric  SLP: Eric  Estimated Discharge:  To be determined in team conference    DVT prophylaxis  On Eliquis     Pain  Cymbalta 60 mg twice daily  Gabapentin 300 mg twice daily  Morphine MS Contin 15 mg twice daily  Acetaminophen 650 mg every 6 hours as needed     Bladder plan  Continent     Bowel plan  Continent  Last bowel movement 2/28     Code Status  Level 1 full code      * Acute CVA (cerebrovascular accident) [facility])  Assessment & Plan  Patient presents with altered mental status found to have acute stroke in the right cerebral peduncle  Currently with left hemiparesis and dysarthria likely compounded from prior stroke as well as new stroke  Currently on a dysphagia 1 diet with thin liquids  Review cognitive testing with OT and speech  Secondary stroke prophylaxis at this time with Eliquis 5 mg twice daily and discontinuation of aspirin  PT, OT, SLP    Consideration for neuropsychology if patient agreeable  Review modifiable risk factors and provide stroke education  Monitor for poststroke pain  Close neuro follow-up as patient has had multiple strokes in the last 2 months    History of stroke  Assessment & Plan  Patient with recent admission in early 2023 for right MCA CVA with petechial hemorrhage and residual left-sided weakness and dysarthria  Patient had mechanical thrombectomy with Dr Summer Zabala on 2/12    Obstructive sleep apnea  Assessment & Plan  Continue CPAP use nightly  Setting of FiO2 to 30%    Bacteremia  Assessment & Plan  Unclear etiology initially of whether her altered mental status was related to stroke versus polypharmacy versus opioid overdose versus infection versus seizure  1/2 micrococcus with a second set staph coag negative and followed by infectious disease after course of antibiotic treatment  Has been off of antibiotics and repeat blood cultures negative to date, ID felt may be contaminant    Discoloration of skin of foot  Assessment & Plan  Per daughter and patient patient's foot will become blue or red and change colors frequently  No evidence for infectious etiology and had a lower extremity arterial duplex with no evidence of any significant arterial occlusive disease  May be related to the seronegative spondyloarthropathies    Hypercalcemia  Assessment & Plan  Intermittent elevations of calcium level  Nephrology has been following, nonsuppressed PTH with concerns for primary hyperparathyroidism  Endocrine consult also initiated status post SPEP with no monoclonal bands and normal vitamin D levels  Status post a CT of the chest abdomen and pelvis with no overt malignancy  Per nephrology consider Sensipar if calcium rises above 11 and avoid calcium supplementation  Endocrine outpatient follow-up    HTN (hypertension)  Assessment & Plan  Currently on metoprolol succinate 25 mg twice daily  Plan per cardiology did not add an ARB in the setting of heart failure however as blood pressure allows    Bipolar depression ([facility] )  Assessment & Plan  Currently on Wellbutrin 100 mg twice daily, Lamictal 150 mg nightly, and Cymbalta 60 mg twice daily  Consistent with home regimen  Continue monitoring for stability, mood lability    Consider neuropsychology consultation if patient agreeable    Osteoporosis  Assessment & Plan  History of an L1 compression fracture status post kyphoplasty  Was previously on Prolia has not been on since 2020  Outpatient referral to endocrine    CHF (congestive heart failure) ([facility])  Assessment & Plan  Wt Readings from Last 3 Encounters:   02/28/23 61 2 kg (134 lb 14 7 oz)   02/16/23 62 6 kg (138 lb)   02/12/23 67 9 kg (149 lb 11 1 oz)       Cardiomyopathy with reduced ejection fraction as low as 10 to 15% back in 2004 but has improved since and most recently was 25 to 30% on last admission  Patient was offered LifeVest but declined  On metoprolol succinate 25 mg twice daily, with plan to add an ARB as her blood pressure would allow  Strict I's and O's and daily weights  Follows with Dr Tiara Chavez at [facility]      Seronegative arthropathy of multiple sites [facility])  [address] with rheumatology and is on Plaquenil 200 mg Monday through Friday and 400 mg on Saturday Sunday  Continue home regimen and follow-up with rheumatology on discharge    Asthma  Assessment & Plan  Continue use of albuterol inhaler 2 puffs every 6 hours as needed    Chronic pain "syndrome  Assessment & Plan  History of chronic pain secondary to compression fractures in her thoracic spine and chronic rib fractures  MS Contin 15 mg every 12 hours  Gabapentin 300 mg twice daily  Cymbalta 60 milligrams twice daily  Acetaminophen 650 mg every 6 hours as needed  Was previously on oxycodone as needed at [facility] but per family due to concerns for polypharmacy and additional opioids beyond her morphine has not been on an acute care since 2/16          Appreciate IM consultants medical co-management  Labs, medications, and imaging personally reviewed  ROS:  A ten point review of systems was completed on 03/01/23 and pertinent positives are listed in subjective section  All other systems reviewed were negative  OBJECTIVE:   /52   Pulse 101   Temp 97 9 °F (36 6 °C) (Oral)   Resp 18   Ht 5' 3\" (1 6 m)   Wt 57 6 kg (126 lb 15 8 oz)   SpO2 97%   BMI 22 49 kg/m²     Physical Exam  Vitals and nursing note reviewed  Constitutional:       General: She is not in acute distress  HENT:      Head: Atraumatic  Comments: Left facial droop     Right Ear: External ear normal       Left Ear: External ear normal       Nose: Nose normal  No rhinorrhea  Mouth/Throat:      Mouth: Mucous membranes are moist       Pharynx: Oropharynx is clear  Eyes:      General: No scleral icterus  Cardiovascular:      Rate and Rhythm: Normal rate  Pulses: Normal pulses  Heart sounds: Normal heart sounds  Pulmonary:      Effort: Pulmonary effort is normal  No respiratory distress  Breath sounds: Normal breath sounds  Abdominal:      General: There is no distension  Palpations: Abdomen is soft  Musculoskeletal:      Cervical back: Normal range of motion  Right lower leg: No edema  Left lower leg: No edema  Skin:     General: Skin is warm and dry  Neurological:      Mental Status: She is alert and oriented to person, place, and time        Comments: " Left hemiparesis and dysmetria with finger-to-nose testing as well as dysarthria no change in overall strength examination from initial evaluation last evening     Psychiatric:         Mood and Affect: Mood normal          Behavior: Behavior normal           Lab Results   Component Value Date    WBC 7 03 02/27/2023    HGB 13 9 02/27/2023    HCT 42 2 02/27/2023    MCV 96 02/27/2023     02/27/2023     Lab Results   Component Value Date    SODIUM 142 02/28/2023    K 4 2 02/28/2023     (H) 02/28/2023    CO2 26 02/28/2023    BUN 15 02/28/2023    CREATININE 0 83 02/28/2023    GLUC 98 02/28/2023    CALCIUM 10 6 (H) 02/28/2023     Lab Results   Component Value Date    INR 0 95 02/22/2023    INR 0 90 02/12/2023    INR 1 12 04/22/2022    PROTIME 12 8 02/22/2023    PROTIME 12 1 02/12/2023    PROTIME 14 0 04/22/2022           Current Facility-Administered Medications:     acetaminophen (TYLENOL) tablet 650 mg, 650 mg, Oral, Q6H PRN, Zada Doing, DO, 650 mg at 03/01/23 0333    albuterol (PROVENTIL HFA,VENTOLIN HFA) inhaler 2 puff, 2 puff, Inhalation, Q6H PRN, Zada Doing, DO    aluminum-magnesium hydroxide-simethicone (MYLANTA) oral suspension 30 mL, 30 mL, Oral, Q4H PRN, Zada Doing, DO, 30 mL at 03/01/23 0339    apixaban (ELIQUIS) tablet 5 mg, 5 mg, Oral, BID, Zada Doing, DO, 5 mg at 03/01/23 0912    atorvastatin (LIPITOR) tablet 40 mg, 40 mg, Oral, QPM, Zada Doing, DO, 40 mg at 02/28/23 1719    buPROPion [facility]) tablet 100 mg, 100 mg, Oral, BID, Zada Doing, DO, 100 mg at 03/01/23 0912    DULoxetine (CYMBALTA) delayed release capsule 60 mg, 60 mg, Oral, BID, Zada Doing, DO, 60 mg at 03/01/23 0912    gabapentin (NEURONTIN) capsule 300 mg, 300 mg, Oral, BID, Zada Doing, DO, 300 mg at 03/01/23 5779    glycerin-hypromellose- (ARTIFICIAL TEARS) ophthalmic solution 1 drop, 1 drop, Both Eyes, Q4H PRN, Zada Doing, DO    hydroxychloroquine (PLAQUENIL) tablet 200 mg, 200 mg, Oral, Once per day on Mon Tue Wed Thu Fri, Carloz Parnell DO, 200 mg at 03/01/23 0912    [START ON 3/4/2023] hydroxychloroquine (PLAQUENIL) tablet 400 mg, 400 mg, Oral, Once per day on Sun Sat, Carloz Parnell DO    lamoTRIgine (LaMICtal) tablet 150 mg, 150 mg, Oral, HS, Illona Erp, DO, 150 mg at 02/28/23 2140    metoprolol succinate (TOPROL-XL) 24 hr tablet 25 mg, 25 mg, Oral, BID, LAURA Howard    morphine (MS CONTIN) ER tablet 15 mg, 15 mg, Oral, BID, Illona Erp, DO, 15 mg at 03/01/23 0909    ondansetron [facility]) injection 4 mg, 4 mg, Intravenous, Q6H PRN, Illona Erp, DO, 4 mg at 03/01/23 1140    polyethylene glycol (MIRALAX) packet 17 g, 17 g, Oral, Daily PRN, Illona Erp, DO    senna-docusate sodium (SENOKOT S) 8 6-50 mg per tablet 1 tablet, 1 tablet, Oral, BID, Illona Erp, DO, 1 tablet at 03/01/23 7845    Past Medical History:   Diagnosis Date    Anxiety     Asthma     B[facility] depression ([facility] )     Chronic narcotic dependence (HCC)     Chronic pain     Depression     Enterovirus heart infection     SHAR (obstructive sleep apnea)     Osteoarthritis     Peripheral neuropathy     Plantar fasciitis of right foot     Senile osteoporosis     Seronegative arthropathy of multiple sites ([facility] )     Undifferentiated connective tissue disease ([facility] )        Patient Active Problem List    Diagnosis Date Noted    Acute CVA (cerebrovascular accident) ([facility] ) 02/23/2023    Obstructive sleep apnea 02/28/2023    History of stroke 02/28/2023    Bacteremia 02/24/2023    Hypercalcemia 02/22/2023    Discoloration of skin of foot 02/22/2023    Abdominal pain 02/22/2023    HFrEF (heart failure with reduced ejection fraction) ([facility] ) 02/13/2023    Stroke ([facility] ) 02/12/2023    HTN (hypertension) 02/12/2023    Cardiomyopathy ([facility] ) 02/07/2023    Vitamin D insufficiency 02/07/2023    LAILA (acute kidney injury) ([facility] ) 04/23/2022    Polypharmacy 04/23/2022    'light-for-dates' infant with signs of fetal malnutrition 04/22/2022    Bipolar depression ([facility] ) 03/04/2022    Rheumatoid arthritis ([place] [address] ) 03/04/2022    Closed fracture of multiple ribs of right side 03/02/2022    Traumatic pneumothorax 03/02/2022    Closed fracture of transverse process of lumbar vertebra ([place] [address] ) 03/02/2022    Liver contusion 03/02/2022    Fall 03/02/2022    Acute pain due to trauma 03/02/2022    Right shoulder pain 03/02/2022    Severe protein-calorie malnutrition ([place] [address] ) 03/02/2022    Undifferentiated connective tissue disease ([place] [address] ) 12/29/2021    Primary generalized (osteo)arthritis 12/29/2021    Peripheral neuropathy 12/29/2021    Osteoporosis 12/29/2021    Hx of compression fracture of spine 12/29/2021    Lumbar spondylosis 12/29/2021    Cervical spondylosis 12/29/2021    Anemia 06/26/2021    Chronic pain syndrome     Asthma     Ambulatory dysfunction     Seronegative arthropathy of multiple sites ([place] [address] )     Gastroesophageal reflux disease without esophagitis     Mixed stress and urge urinary incontinence     CHF (congestive heart failure) ([place] [address] )           Francisco Ortiz,   Physical Medicine and Priyanka Rice    I have spent a total time of 35 minutes on 03/01/23 in caring for this patient including Risks and benefits of tx options, Instructions for management, Patient and family education, Importance of tx compliance, Risk factor reductions, Counseling / Coordination of care, Documenting in the medical record and Communicating with other healthcare professionals   Additional time spent on family education with patient and brother and reviewing her experience on therapy evaluations

## 2023-03-02 LAB
ANION GAP SERPL CALCULATED.3IONS-SCNC: -1 MMOL/L (ref 4–13)
BASOPHILS # BLD AUTO: 0.03 THOUSANDS/ÂΜL (ref 0–0.1)
BASOPHILS NFR BLD AUTO: 0 % (ref 0–1)
BUN SERPL-MCNC: 14 MG/DL (ref 5–25)
CALCIUM SERPL-MCNC: 10.6 MG/DL (ref 8.3–10.1)
CHLORIDE SERPL-SCNC: 108 MMOL/L (ref 96–108)
CO2 SERPL-SCNC: 31 MMOL/L (ref 21–32)
CREAT SERPL-MCNC: 0.91 MG/DL (ref 0.6–1.3)
EOSINOPHIL # BLD AUTO: 0.12 THOUSAND/ÂΜL (ref 0–0.61)
EOSINOPHIL NFR BLD AUTO: 1 % (ref 0–6)
ERYTHROCYTE [DISTWIDTH] IN BLOOD BY AUTOMATED COUNT: 14.3 % (ref 11.6–15.1)
GFR SERPL CREATININE-BSD FRML MDRD: 65 ML/MIN/1.73SQ M
GLUCOSE P FAST SERPL-MCNC: 107 MG/DL (ref 65–99)
GLUCOSE SERPL-MCNC: 107 MG/DL (ref 65–140)
HCT VFR BLD AUTO: 41.3 % (ref 34.8–46.1)
HGB BLD-MCNC: 13.5 G/DL (ref 11.5–15.4)
IMM GRANULOCYTES # BLD AUTO: 0.02 THOUSAND/UL (ref 0–0.2)
IMM GRANULOCYTES NFR BLD AUTO: 0 % (ref 0–2)
LAMOTRIGINE SERPL-MCNC: 4.3 UG/ML (ref 2–20)
LYMPHOCYTES # BLD AUTO: 1.23 THOUSANDS/ÂΜL (ref 0.6–4.47)
LYMPHOCYTES NFR BLD AUTO: 13 % (ref 14–44)
MCH RBC QN AUTO: 32.4 PG (ref 26.8–34.3)
MCHC RBC AUTO-ENTMCNC: 32.7 G/DL (ref 31.4–37.4)
MCV RBC AUTO: 99 FL (ref 82–98)
MONOCYTES # BLD AUTO: 0.41 THOUSAND/ÂΜL (ref 0.17–1.22)
MONOCYTES NFR BLD AUTO: 4 % (ref 4–12)
NEUTROPHILS # BLD AUTO: 8.02 THOUSANDS/ÂΜL (ref 1.85–7.62)
NEUTS SEG NFR BLD AUTO: 82 % (ref 43–75)
NRBC BLD AUTO-RTO: 0 /100 WBCS
PLATELET # BLD AUTO: 245 THOUSANDS/UL (ref 149–390)
PMV BLD AUTO: 10.4 FL (ref 8.9–12.7)
POTASSIUM SERPL-SCNC: 4.2 MMOL/L (ref 3.5–5.3)
RBC # BLD AUTO: 4.17 MILLION/UL (ref 3.81–5.12)
SODIUM SERPL-SCNC: 138 MMOL/L (ref 135–147)
WBC # BLD AUTO: 9.83 THOUSAND/UL (ref 4.31–10.16)

## 2023-03-02 RX ORDER — GABAPENTIN 300 MG/1
300 CAPSULE ORAL
Status: DISCONTINUED | OUTPATIENT
Start: 2023-03-02 | End: 2023-03-03

## 2023-03-02 RX ORDER — POLYETHYLENE GLYCOL 3350 17 G/17G
17 POWDER, FOR SOLUTION ORAL DAILY
Status: DISCONTINUED | OUTPATIENT
Start: 2023-03-03 | End: 2023-03-14

## 2023-03-02 RX ORDER — GABAPENTIN 100 MG/1
200 CAPSULE ORAL 2 TIMES DAILY
Status: DISCONTINUED | OUTPATIENT
Start: 2023-03-02 | End: 2023-03-03

## 2023-03-02 RX ORDER — POLYETHYLENE GLYCOL 3350 17 G/17G
17 POWDER, FOR SOLUTION ORAL DAILY PRN
Status: DISCONTINUED | OUTPATIENT
Start: 2023-03-02 | End: 2023-03-25 | Stop reason: HOSPADM

## 2023-03-02 RX ADMIN — HYDROXYCHLOROQUINE SULFATE 200 MG: 200 TABLET ORAL at 06:41

## 2023-03-02 RX ADMIN — GABAPENTIN 200 MG: 100 CAPSULE ORAL at 15:49

## 2023-03-02 RX ADMIN — ATORVASTATIN CALCIUM 40 MG: 40 TABLET, FILM COATED ORAL at 17:57

## 2023-03-02 RX ADMIN — LAMOTRIGINE 150 MG: 100 TABLET ORAL at 21:44

## 2023-03-02 RX ADMIN — GABAPENTIN 300 MG: 300 CAPSULE ORAL at 08:26

## 2023-03-02 RX ADMIN — SENNOSIDES AND DOCUSATE SODIUM 1 TABLET: 8.6; 5 TABLET ORAL at 08:26

## 2023-03-02 RX ADMIN — APIXABAN 5 MG: 5 TABLET, FILM COATED ORAL at 17:56

## 2023-03-02 RX ADMIN — DULOXETINE HYDROCHLORIDE 60 MG: 60 CAPSULE, DELAYED RELEASE ORAL at 08:26

## 2023-03-02 RX ADMIN — MENTHOL, UNSPECIFIED FORM AND METHYL SALICYLATE: 10; 150 CREAM TOPICAL at 15:49

## 2023-03-02 RX ADMIN — BUPROPION HYDROCHLORIDE TABLETS 100 MG: 100 TABLET, FILM COATED ORAL at 08:27

## 2023-03-02 RX ADMIN — ALUMINUM HYDROXIDE, MAGNESIUM HYDROXIDE, AND DIMETHICONE 30 ML: 200; 20; 200 SUSPENSION ORAL at 21:45

## 2023-03-02 RX ADMIN — ALUMINUM HYDROXIDE, MAGNESIUM HYDROXIDE, AND DIMETHICONE 30 ML: 200; 20; 200 SUSPENSION ORAL at 15:49

## 2023-03-02 RX ADMIN — BUPROPION HYDROCHLORIDE TABLETS 100 MG: 100 TABLET, FILM COATED ORAL at 17:58

## 2023-03-02 RX ADMIN — LIDOCAINE 5% 1 PATCH: 700 PATCH TOPICAL at 08:25

## 2023-03-02 RX ADMIN — GABAPENTIN 300 MG: 300 CAPSULE ORAL at 21:45

## 2023-03-02 RX ADMIN — SENNOSIDES AND DOCUSATE SODIUM 1 TABLET: 8.6; 5 TABLET ORAL at 17:58

## 2023-03-02 RX ADMIN — APIXABAN 5 MG: 5 TABLET, FILM COATED ORAL at 08:26

## 2023-03-02 RX ADMIN — ALUMINUM HYDROXIDE, MAGNESIUM HYDROXIDE, AND DIMETHICONE 30 ML: 200; 20; 200 SUSPENSION ORAL at 11:00

## 2023-03-02 RX ADMIN — DULOXETINE HYDROCHLORIDE 60 MG: 60 CAPSULE, DELAYED RELEASE ORAL at 21:45

## 2023-03-02 RX ADMIN — MORPHINE SULFATE 15 MG: 15 TABLET, FILM COATED, EXTENDED RELEASE ORAL at 08:26

## 2023-03-02 RX ADMIN — METOPROLOL SUCCINATE 25 MG: 25 TABLET, EXTENDED RELEASE ORAL at 17:59

## 2023-03-02 RX ADMIN — MORPHINE SULFATE 15 MG: 15 TABLET, FILM COATED, EXTENDED RELEASE ORAL at 18:00

## 2023-03-02 NOTE — PROGRESS NOTES
"   03/02/23 1300   Pain Assessment   Pain Assessment Tool 0-10   Pain Score 5   Pain Location/Orientation Orientation: Lower; Location: Back   Restrictions/Precautions   Precautions Bed/chair alarms;Aspiration;Cognitive; Fall Risk;Pain;Supervision on toilet/commode;Visual deficit   Weight Bearing Restrictions No   ROM Restrictions No   Lifestyle   Autonomy \"I want to get better at everything, things aren't as easy  \"   Upper Body Dressing   Type of Assistance Needed Physical assistance;Verbal cues; Set-up / clean-up   Physical Assistance Level 51%-75%   Comment able to recall hernandez-dressing  Pt requesting to don zipper sweatshirt  A to fully thread LUE, although improvement noted compared to yesterday w/ Mod cues to improve technique  A to pull around back and pt able to thread RUE w/ A to fully pull down back  Upper Body Dressing CARE Score 2   Sit to Stand   Type of Assistance Needed Physical assistance;Verbal cues; Adaptive equipment;Set-up / clean-up   Physical Assistance Level 51%-75%   Comment cues for proper hand placement, guard provided on pt's L  + gait belt   Sit to Stand CARE Score 2   Bed-Chair Transfer   Type of Assistance Needed Physical assistance; Adaptive equipment;Verbal cues; Set-up / clean-up   Physical Assistance Level Total assistance   Comment Mod Ax1 stand pivot w/ RW, + gait belt  Initially req SBA of 2nd person for safety  Req Mod-Max vc's for proper hand placement, body mechanics, and RW management  With repetition, pt able to complete w/ Mod Ax1 w/ RW, guard on L as pt w/ difficulty sustaining L gross grasp of RW handle  Chair/Bed-to-Chair Transfer CARE Score 1   Toileting Hygiene   Type of Assistance Needed Physical assistance;Verbal cues; Adaptive equipment;Set-up / clean-up   Physical Assistance Level 76% or more   Comment Pt able to manage hygiene while seated w/ inc time  Mod A to steady in stance at RW, repetitive vc's for R unilateral release w/ ability to partially manage over R hip   " With L unilateral release, inc difficulty grasping pants despite multimodal cues provided req A to manage over L hip and fully in back w/ BUE support of RW  Toileting Hygiene CARE Score 2   Toilet Transfer   Type of Assistance Needed Physical assistance;Verbal cues; Gilles Michel / Elizabeth De Dios; Adaptive equipment   Physical Assistance Level 76% or more   Comment Mod-Max Ax1 stand pivot w/ RW to [facility] over toilet, req Mod-Max vc's for proepr hand placement, body mechanics, and RW management  Completed BSC over toilet, however w/ staff would recommend stand pivots to [facility] to allow for guard on pt's L 2* L hemiparesis, L field cut, and L-inattention  Toilet Transfer CARE Score 2   ROM - Left Upper Extremities    L Shoulder AAROM; Flexion   L Position Seated   L Weight/Reps/Sets 5x5   LUE ROM Comment utilized mirror to maximize visual feedback, req cues to maximize attention to LUE  Add'lly req cues to maximize technique  Req stabilization at elbow for last set 2* LUE fatigue  Exercise Tools   Other Exercise Tool 1 Provided w/ graded red hand sponge, completed 3x10 hand squeezes to focus on digit F/E  Req Min-Mod vc's to maximize technique and to inc LUE attention  Cognition   Overall Cognitive Status Impaired   Arousal/Participation Alert; Cooperative   Attention Difficulty attending to directions  (impaired divided attention )   Orientation Level Oriented to person;Oriented to place;Oriented to time;Oriented to situation  (- [place])   Memory Decreased recall of precautions;Decreased recall of recent events;Decreased short term memory   Following Commands Follows one step commands inconsistently   Comments poor divided attention, impaired safety awareness, L-inattention, impaired STM, dec fxnl problem-solving, impaired cognitive flexibility, impaired insight into deficits, and cognitive fatigue  Vision   Vision Comments Would benefit from further visual assessment     Assessment   Treatment Assessment Pt seen for skilled OT session focusing on toileting, stand pivot fxnl xfers w/ RW  Pt cont to be limited by L hemiparesis, L-inattention w/ L peripheral field cut, and cognitive deficits, therefore req Mod-Max vc's for all fxnl act to inc safety w/ fxnl act  Pt add'lly noted w/ fatigue following light act and req inc cues to inc undivided attention  During toileting, pt reported burning while urinating, RN made aware of same  Spoke w/ primary PT, requesting to cont xfers w/ RW at this time  Cont OT POC: LUE NMR, fxnl stand pivot xfers w/ RW, endurance work, fxnl standing tolerance, fxnl cognitive retraining, ADL retraining, and repetitive safety training  Pt requested to rest in recliner, all needs within reach and alarm activated  Prognosis Good   Problem List Decreased strength;Decreased endurance; Impaired balance;Decreased mobility; Decreased cognition;Decreased safety awareness   Plan   Treatment/Interventions ADL retraining;Functional transfer training; Therapeutic exercise;Cognitive reorientation;Patient/family training;Equipment eval/education; Bed mobility   Progress Progressing toward goals   Recommendation   OT Discharge Recommendation   (pending progress)   OT Therapy Minutes   OT Time In 1300   OT Time Out 1400   OT Total Time (minutes) 60   OT Mode of treatment - Individual (minutes) 60   OT Mode of treatment - Concurrent (minutes) 0   OT Mode of treatment - Group (minutes) 0   OT Mode of treatment - Co-treat (minutes) 0   OT Mode of Treatment - Total time(minutes) 60 minutes   OT Cumulative Minutes 150   Therapy Time missed   Time missed?  No

## 2023-03-02 NOTE — PROGRESS NOTES
Pastoral Care Progress Note    3/2/2023  Patient: Alfredo Henderson : 1955  Admission Date & Time: 2023 1541  MRN: [id number] [facility]: [id number]        This was my first time meeting Ana Maria, who was in therapy on a break  With staff permission, we chatted briefly to get acquainted, I assured her of my ongoing support and availability in her ARC time, and I will visit her in her room soon

## 2023-03-02 NOTE — PROGRESS NOTES
"   03/02/23 1030   Pain Assessment   Pain Assessment Tool 0-10   Pain Score 5   Pain Location/Orientation Orientation: Lower; Location: Back   Restrictions/Precautions   Precautions Bed/chair alarms;Aspiration;Cognitive; Fall Risk;Pain;Supervision on toilet/commode;Visual deficit   Comprehension   Comprehension (FIM) 4 - Understands basic info/conversation 75-90% of time   Expression   Expression (FIM) 3 - Expresses basic info/needs 50-74% of time   Social Interaction   Social Interaction (FIM) 5 - Interacts appropriately with others 90% of time   Problem Solving   Problem solving (FIM) 2 - Solves basic problems 25-49% of time   Memory   Memory (FIM) 3 - Recognizes, recalls/performs 50-74%   Speech/Language/Cognition Assessmetn   Treatment Assessment Pt completed portions of the CLQT+ as a continuation of initial evaluation  A Composite Severity Rating score has not been generated due to incompletion of test  Pt scored at or above criterion cut score for 2 out of 4 tasks completed  Pt's Cognitive Domain Scores have yet to be computed  Cognitive Domain: Score:  Severity Rating:   Attention   -     Memory -    Executive Functions -       Language -       Visuospatial Skills  -       Clock Drawing Screen    9 MODERATE   Overall Composite Severity Rating Score - out of 4 0       Pt was educated on results of portions of assessment completed  She demonstrated thus far impairment in visuospatial skills such as visual scanning, visual attention (yvette  L side), and visual processing speed; additionally, errors of commission noted  Pt reported \"double vision\" and that she was seeing symbols off the side of the page and that is was frustrating  SLP encouraged pt to do her best to which pt became tangential and difficult to redirect  SLP brought conversation back to lighthouse scanning to aid in her visual inattention to her L side  Pt expressing appreciation as she reported being aware of her L neglect   Pt also demonstrating " similar visual impairment for clock drawing subtest, demonstrating moderate impairment overall  Pt was receptive to all information provided, along with recommendations for completing CLQT + and for skilled SLP services during acute rehab stay to maximize overall cognitive linguistic skills  CLQT+ was not completed as pt needed to begin her lunch meal as she is also presenting with oropharyngeal dysphagia  See swallow assessment ST note for details regarding meal     Before initiating CLQT +, pt and SLP engaging in [facility] and recall of therapy earlier in day and orientation to daily schedule  Pt able to recall that she had PT, her therapist name, and the activities she completed and easily oriented to schedule  Swallow Assessment   Swallow Treatment Assessment Daily Dysphagia Tx Note     Patient Name: Lizette Mejía    YLBSX'T Date: 3/2/2023      Current Risks for Dysphagia & Aspiration: Weak voicing; weak cough; dysarthria; general debilitation; new neuro event; brain injury; cognitive deficit; hx neurologic dx; positioning issues    Current Symptoms/Concerns: Cough; throat clear; with liquids; during meals; with pills; difficulty chewing; decreased oral intake; hx of dysphagia/aspiration    Current diet: puree/level 1 diet and thin liquids     Premorbid diet::upon d/c from prior admission, pt appeared to be on dysphagia level 2/thin liquids upon prior admission for stroke initially; unsure of diet prior to admission in general due to pt reporting being on softer foods as well as thickened liquids in the past  Will confirm with family in future sessions       Cognitive Status: see ST daily note    Positioning: upright in recliner    Items administered:Consistencies Administered: thin liquids, puree and mechanical soft solids  Materials administered included: crab cakes w/ gravy, carrots, mashed potatoes, peaches, coffee, water    Total amount of meal consumed:   Crab cakes (20%)  Carrots (10%)  Mashed potatoes (10%)  Peaches (50%)  Coffee (90cc, cup sips)  Water (90cc, cup sips)        Oral stage:moderate  Lip closure: around cup decreased for thins, adequate for mechanical soft  Anterior spillage: x3 with thins w/ consecutive sips (VC to take single sips effective in reducing subsequent spills), none observed with mechanical soft  Mastication: vertical munching observed & frequent bolus switching from L to R t/o meal  Moderately prolonged  Bolus formation: moderately impaired; lingual myoclonus observed w  & w/out boluses  Bolus control: moderately reduced w/ thins, mildly reduced w/ mechanical soft, reduced further to moderate w/ x2 talking w/ mechanical soft  Transfer: moderately delayed across boluses  Oral residue: none observed post meal  Pocketing: none observed post meal         Pharyngeal stage:moderate  Swallow promptness: moderately delayed across boluses  Hyolaryngeal elevation:  Moderately reduced upon palpation across boluses  Wet voice: t/o meal across boluses  Throat clear: x2 with thins, x1 with chopped  Cough: weak x3 for mechanical soft, x2 with puree/mashed, x4 with thins  Secondary swallows: inconsistently used t/o meal  Audible swallows: x11 with chopped, x7 w/ thins       Esophageal stage:moderate  Belch w/ thins x4, UE sounds x3 w/ chopped, x1 w/ thins  Mild regurgitation at end of meal with large cup sips of water paired with weak coughing x3  Able to clear and calm after VC to take deep breath and strong cough  Summary:     Pt presenting with moderate oral and moderate pharyngeal dysphagia today  Symptoms or concerns included decreased mastication, decreased bolus formation and anterior loss/spillage of thins suspected decreased hyolaryngeal elevation upon palpation, multiple swallows and audible swallows  SLP reviewing strategies before initiation of meal  Pt nodding in agreement to strategies   Patient demonstrating visual inattention when beginning meal as she searched for her utensils which were on her L side  SLP providing reminder to use lighthouse scanning to find; beneficial  Her bite sizes for mechanical soft/chopped were small and she demonstrated reduced oral stripping d/t her L OM weakness, though it was fxl for mechanical soft, thus her bolus control was moderately reduced w/ thins as observed with anterior spill x3 on consecutive sips; VC to take single sips beneficial in subsequent attempts  Control mildly reduced w/ mechanical soft, reduced further to moderate w/ x2 talking w/ mechanical soft  Her mastication was moderately prolonged and she was observed to have a vertical munching pattern as opposed to the more effective rotary chew  Also, pt exhibiting bolus transfers from L to R frequently throughout meal  Overall, moderately impaired for bolus formation and she presented with lingual myoclonus w/ and w/out boluses  Oral transfer was moderately delayed across boluses and she did not present with oral residue or pocketing mid and post meal upon SLP evaluation  Pharyngeally, the pt w/ overall moderate impairment as observed by her moderately delayed swallow promptness and moderately reduced HLE upon palpation across all items, throat clear x2 w/ thins & x1 w/ chopped, weak cough x3 w/ mechanical soft/chopped, x2 with puree, & x4 w/ thins, inconsistent secondary swallows t/o meal, and numerous audible swallows with both chopped and thins (see above)  Her esophageal stage is suspected moderately impaired as measured by pt's symptoms (see above)  Pt's overall inattention to meal (talking about her  son, answering phone calls during meal, commenting on things outside of window, etc ) is likely impacting her swallow skills at this time, but it is also impaired suspected d/t pharyngeal constriction, OM weakness, myoclonus, weak diaphragm necessary for clearing foreign objects entering airway, and suspected UES function       Recommendations:  Diet: puree/level 1 diet and thin liquids  Meds: whole with puree  Strategies: upright posture, only feed when fully alert, slow rate of feeding, small bites/sips, no straws, effortful swallow, cough every 5 bites/sips, quiet environment (tv off, limit talking, door closed, etc ), alternating bites and sips and OOB preferred for ALL meals, use of lingual sweeps to L buccal cavity as needed    FULL supervision w/ meals  Results reviewed with:  patient, RN, OT, NP and PATY-Kimberlee   Aspiration precautions posted  F/u ST tx: Pt will continue to benefit from ongoing skilled dysphagia tx sessions to establish safest least restrictive diet w/o increased oropharyngeal or aspiration sxs as well as monitor ability to carryover swallow strategies independently  Plan: consult GI pending lunch meal tomorrow, 3/3/23 & continue to monitor supervision status  Initiate level 2 food trials under SLP supervision ONLY           Trial thin liquids w/ straw sips            If increased signs/sxs of aspiration increased as dysphagia tx sessions progress, consider repeat VFSS to determine current swallow function             And least restrictive diet   SLP Therapy Minutes   SLP Time In 1030   SLP Time Out 1200   SLP Total Time (minutes) 90   SLP Mode of treatment - Individual (minutes) 90   SLP Mode of treatment - Concurrent (minutes) 0   SLP Mode of treatment - Group (minutes) 0   SLP Mode of treatment - Co-treat (minutes) 0   SLP Mode of Treatment - Total time(minutes) 90 minutes   SLP Cumulative Minutes 150   Therapy Time missed   Time missed?  No

## 2023-03-02 NOTE — CASE MANAGEMENT
CM met with patient and phoned daughter Burnis Alt to obtain admission information  Patient pleasant, answered questions appropriately  Patient's daughter Burnis Alt reported patient lives with her son and brother in multi level half double home  Burnis Alt stated she is moving her mother down to the first floor  Burnis Alt stated she will be taking over primary chores and care of patient, with assistance from patient's son and brother  Patient's brother is older, may not be able to offer physical assistance  Patient was Independent PTA to stroke, on disability from broken back and multiple surgeries, for approx 10-15 years  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, and has been resistant to assistance in the past  CM made  Burnis Alt aware, therapists will be going over ADL's medication, and discussing potential needs for assistance  Patient has a walker, cane and shower chair, has STR experience at [facility] and [facility]  Burnis Alt confirmed medical insurance as Medicare as primary and AARP as secondary  Patient has prescription coverage  Burnis Alt is requesting a wheelchair for home, CM made her aware therapists will be determining needs for equipment during therapy sessions, will give order form to CM and CM will order equipment  CM made Clarence aware CM will pass her request along to the team   Clarence leaves approx 10-15 minutes away  Family will be providing transport  Patient was inpatient admission 2/12/23, went to [facility], then readmitted to inpatient 2/22 with altered mental status at [facility]  Patient's daughter and patient stated they were not happy with [facility]  CM will continue to follow for discharge needs

## 2023-03-02 NOTE — PROGRESS NOTES
03/02/23 0830   Pain Assessment   Pain Assessment Tool 0-10   Pain Score 9   Pain Location/Orientation Location: Foot;Orientation: Left  (big and 2nd toes)   Pain Onset/Description Onset: Ongoing; Onset: Sudden  (noted discoloration - MD notified)   Hospital Pain Intervention(s) Rest;Repositioned  (took off shoes and put on gripper socks due to reported pain)   Multiple Pain Sites Yes   Pain 2   Pain Score 2 2  (LBP)   Restrictions/Precautions   Precautions Fall Risk;Cognitive;Bed/chair alarms;Aspiration;Pain   Cognition   Overall Cognitive Status Impaired   Arousal/Participation Alert; Cooperative   Comments easily distracted   Subjective   Subjective pt just finished breakfast and ready for PT   Lying to Sitting on Side of Bed   Type of Assistance Needed Physical assistance;Verbal cues   Physical Assistance Level 51%-75%   Comment no rails, HOB slightly elevated with mod A for L UE/LE management   Lying to Sitting on Side of Bed CARE Score 2   Sit to Stand   Type of Assistance Needed Physical assistance;Verbal cues   Physical Assistance Level 51%-75%   Comment unable to complete safely without VC  mod-max without AD, mod with RW   Sit to Stand CARE Score 2   Bed-Chair Transfer   Type of Assistance Needed Physical assistance;Verbal cues; Adaptive equipment   Physical Assistance Level Total assistance   Comment unable to complete safely without VC  mod-max without AD, mod A with max VC for L hand placement/walker management   Chair/Bed-to-Chair Transfer CARE Score 1   Walk 10 Feet   Type of Assistance Needed Physical assistance;Verbal cues; Adaptive equipment   Physical Assistance Level Total assistance   Comment unable to complete safely without VC or assist  max of 1 R HHA, mod with RW VC to keep right for pt gets too close to the Left side of the walker and during turns has tendency to keep left foot out of the walker   Walk 10 Feet CARE Score 1   Walk 50 Feet with Two Turns   Type of Assistance Needed Physical "assistance;Verbal cues; Adaptive equipment   Physical Assistance Level Total assistance   Comment unable to complete safely without VC or assist  max of 1 R HHA, mod with RW VC to keep right for pt gets too close to the Left side of the walker and during turns has tendency to keep left foot out of the walker   Walk 50 Feet with Two Turns CARE Score 1   Walk 150 Feet   Type of Assistance Needed Physical assistance;Verbal cues; Adaptive equipment   Physical Assistance Level Total assistance   Comment unable to complete safely without VC or assist  max of 1 R HHA x 150' with more notable SOB SpO2 at  89-90%, -103 after task, mod with RW VC to keep right for pt gets too close to the Left side of the walker and during turns has tendency to keep left foot out of the walker, SpO2 98%, NV 75-78   Walk 150 Feet CARE Score 1   Wheel 50 Feet with Two Turns   Type of Assistance Needed Physical assistance;Verbal cues   Physical Assistance Level 51%-75%   Wheel 50 Feet with Two Turns CARE Score 2   Wheel 150 Feet   Type of Assistance Needed Physical assistance;Verbal cues   Physical Assistance Level 51%-75%   Comment assist in tight spaces and VC for technique - using bilat LE and R UE only, unsafe to use L UE due to inattention   Wheel 150 Feet CARE Score 2   Curb or Single Stair   Style negotiated Single stair   Type of Assistance Needed Physical assistance;Verbal cues   Physical Assistance Level Total assistance   Comment unable to complete without VC or AD  max of 1 on 6\"  bilat HR non reciprocal pattern x 4 reps  ascent/descent fwd facing  max VC/assist to keep L hand on rail and use it effectively  SpO2 92%,    1 Step (Curb) CARE Score 1   4 Steps   Type of Assistance Needed Physical assistance;Verbal cues; Adaptive equipment   Physical Assistance Level Total assistance   4 Steps CARE Score 1   Toilet Transfer   Type of Assistance Needed Physical assistance;Verbal cues   Physical Assistance Level Total " assistance   Comment unable to complete without AD or VC  using regular toilet with RW mod A for transfers, VC for L hand placement  Mod A for toileting/CM after bladder movement  Max A for LB dressing for pt requested to change leggings  Toilet Transfer CARE Score 1   Assessment   Treatment Assessment Pt participated with skilled PT alert but easily distracted requiring max redirection yvette attending to L UE/LE and environment  at times requires assist to keep L hand  on AD during mobility training  although pt is able to engaged in [facility] training but anticipate for pt to use a RW at d/c considering Hx of multiple falls at home  Due to ongoing L side inattention and to facilitate carry over and improve overall safety as early as now will focus functional mobility training using a RW, OT in agreement  PT will f/u with family if w/c will fit in pt's house for it could be an alternative mode of mobility for the pt at d/  Cont with functional mobility training using a RW and NPP training to reduce risk for falls and dec caregiver burden  Family/Caregiver Present no   Plan   Treatment/Interventions Functional transfer training; Therapeutic exercise;LE strengthening/ROM; Bed mobility;Gait training;Spoke to nursing;OT;Spoke to MD;Endurance training;Equipment eval/education   Progress Progressing toward goals   PT Therapy Minutes   PT Time In 0820   PT Time Out 1000   PT Total Time (minutes) 100   PT Mode of treatment - Individual (minutes) 100   PT Mode of treatment - Concurrent (minutes) 0   PT Mode of treatment - Group (minutes) 0   PT Mode of treatment - Co-treat (minutes) 0   PT Mode of Treatment - Total time(minutes) 100 minutes   PT Cumulative Minutes 190

## 2023-03-02 NOTE — PROGRESS NOTES
PM&R PROGRESS NOTE:  Amaris Dawson 75 y o  female MRN: [id number]  Unit/Bed#: -01 Encounter: [id number]        Rehabilitation Diagnosis: Impairment of mobility, safety, Activities of Daily Living (ADLs), and cognitive/communication skills due to Stroke:  01 1  Left Body Involvement (Right Brain)    HPI: Amaris Dawson is a 75 y o  female with history of asthma, anxiety, bipolar depression, chronic narcotic use, previous enteroviral infection of the heart, seronegative arthropathy of multiple joints, undifferentiated connective tissue disorder, obstructive sleep apnea on home CPAP who presented to the [address] [facility] on 2/22 for altered mental status  According to the patient's daughter on chart review, the patient's mental status had been declining for the prior 48 hours to admission  She did receive Narcan which improved her mental status slightly  Of note she had a recent stroke with revascularization on 2/12/2023 with Dr Randall Velasquez  She was seen by neurology with overall good strength except mildly weak in the lower extremities with brisk reflexes on the left and a positive Haroon's  At this time is felt to be potentially related to polypharmacy in the setting of chronic pain meds versus seizure in the setting of a recent stroke  She was initiated on 81 mg of aspirin daily with a plan of potentially transitioning to Eliquis, plan for EEG  CT of the head was negative  MRI was completed showing a new small acute/recent interval lacunar infarction in the right cerebral pedun there is jacinta  She was started on Eliquis  Nephrology was consulted as well for hypercalcemia  The patient was evaluated by the Rehabilitation team and deemed an appropriate candidate for comprehensive inpatient rehabilitation and admitted to the [facility] on 2/28/2023  3:41 PM patient was seen in room with brother at bedside and all questions regarding the acute inpatient rehabilitation program were answered  We also discussed at length course in the hospital and her last hospitalization to verify events and clarify any imaging or laboratory findings  Patient is eager to start her therapy program       SUBJECTIVE: Patient seen face-to-face  Acute events overnight include pain in the low back as well as some burning sensation in the toes in the left foot  In general she states that she has pain more often at night and has been chronic  We did order an aqua K for her with strict instructions about not applying it to any over the area that has Bengay or lidocaine-based ointment or patches as that can cause increased pain and burning sensation  We did increase her gabapentin from 300 mg twice daily to 200 mg twice daily with a 300 mg dose nightly to try and address some of the neuropathic pain in the leg overnight  She did receive a dose of meloxicam last night which was helpful but due to anticoagulation would have to be limited in how often she obtains and uses this med type of medication  Patient denies fever, chills, nausea, emesis, cough, shortness of breath, diarrhea, or constipation  Sleep was limited due to pain as above, mood stable  Pain is controlled today  Adding oxygen via nasal cannula overnight as patient refusing to use CPAP  Education provided as we do recommend the CPAP if she was prescribed in the past         ASSESSMENT: Stable, progressing      PLAN:    Rehabilitation  • Functional deficits:  Self care and mobility  • Continue current rehabilitation plan of care to maximize function  • Functional update:   o PT: Bed mobility min to moderate assistance, total assistance for transfers  o OT: Total assist for oral hygiene, set up for eating, total assist for bathing, mod to max assist for dressing, total assist for footwear  o SLP: Moderately impaired at the word/phrase/sentence/conversational level  • Estimated Discharge:  To be determined in team conference    DVT prophylaxis  • On Eliquis Pain  • Cymbalta 60 mg twice daily  • Gabapentin 300 mg twice daily  • Morphine MS Contin 15 mg twice daily  • Acetaminophen 650 mg every 6 hours as needed     Bladder plan  • Continent     Bowel plan  • Continent  • Last bowel movement 2/28  • Added MiraLAX daily in addition to as needed, also on senna and Colace currently encourage as needed use  Code Status  • Level 1 full code      * Acute CVA (cerebrovascular accident) [facility])  Assessment & Plan  Patient presents with altered mental status found to have acute stroke in the right cerebral peduncle  Currently with left hemiparesis and dysarthria likely compounded from prior stroke as well as new stroke  Currently on a dysphagia 1 diet with thin liquids  Review cognitive testing with OT and speech  Secondary stroke prophylaxis at this time with Eliquis 5 mg twice daily and discontinuation of aspirin  PT, OT, SLP    Consideration for neuropsychology if patient agreeable  Review modifiable risk factors and provide stroke education  Monitor for poststroke pain  Close neuro follow-up as patient has had multiple strokes in the last 2 months    History of stroke  Assessment & Plan  Patient with recent admission in early 2023 for right MCA CVA with petechial hemorrhage and residual left-sided weakness and dysarthria  Patient had mechanical thrombectomy with Dr Leonarda Burden on 2/12    Obstructive sleep apnea  Assessment & Plan  Continue CPAP use nightly  Setting of FiO2 to 30%    Bacteremia  Assessment & Plan  Unclear etiology initially of whether her altered mental status was related to stroke versus polypharmacy versus opioid overdose versus infection versus seizure  1/2 micrococcus with a second set staph coag negative and followed by infectious disease after course of antibiotic treatment  Has been off of antibiotics and repeat blood cultures negative to date, ID felt may be contaminant    Discoloration of skin of foot  Assessment & Plan  Per daughter and patient patient's foot will become blue or red and change colors frequently  No evidence for infectious etiology and had a lower extremity arterial duplex with no evidence of any significant arterial occlusive disease  May be related to the seronegative spondyloarthropathies    Hypercalcemia  Assessment & Plan  Intermittent elevations of calcium level  Nephrology has been following, nonsuppressed PTH with concerns for primary hyperparathyroidism  Endocrine consult also initiated status post SPEP with no monoclonal bands and normal vitamin D levels  Status post a CT of the chest abdomen and pelvis with no overt malignancy  Per nephrology consider Sensipar if calcium rises above 11 and avoid calcium supplementation  Endocrine outpatient follow-up    HTN (hypertension)  Assessment & Plan  Currently on metoprolol succinate 25 mg twice daily  Plan per cardiology did not add an ARB in the setting of heart failure however as blood pressure allows    Bipolar depression ([address] )  Assessment & Plan  Currently on Wellbutrin 100 mg twice daily, Lamictal 150 mg nightly, and Cymbalta 60 mg twice daily  Consistent with home regimen  Continue monitoring for stability, mood lability    Consider neuropsychology consultation if patient agreeable    Osteoporosis  Assessment & Plan  History of an L1 compression fracture status post kyphoplasty  Was previously on Prolia has not been on since 2020  Outpatient referral to endocrine    CHF (congestive heart failure) ([facility] )  Assessment & Plan  Wt Readings from Last 3 Encounters:   02/28/23 61 2 kg (134 lb 14 7 oz)   02/16/23 62 6 kg (138 lb)   02/12/23 67 9 kg (149 lb 11 1 oz)       Cardiomyopathy with reduced ejection fraction as low as 10 to 15% back in 2004 but has improved since and most recently was 25 to 30% on last admission  Patient was offered LifeVest but declined  On metoprolol succinate 25 mg twice daily, with plan to add an ARB as her blood pressure would allow  Strict I's and O's and daily "weights  Follows with Dr Phuong Meza at [facility]      Seronegative arthropathy of multiple sites [facility])  [address] with rheumatology and is on Plaquenil 200 mg Monday through Friday and 400 mg on Saturday Sunday  Continue home regimen and follow-up with rheumatology on discharge    Asthma  Assessment & Plan  Continue use of albuterol inhaler 2 puffs every 6 hours as needed    Chronic pain syndrome  Assessment & Plan  History of chronic pain secondary to compression fractures in her thoracic spine and chronic rib fractures  · MS Contin 15 mg every 12 hours  · Gabapentin 300 mg twice daily  · Cymbalta 60 milligrams twice daily  · Acetaminophen 650 mg every 6 hours as needed  · Was previously on oxycodone as needed at [facility] but per family due to concerns for polypharmacy and additional opioids beyond her morphine has not been on an acute care since 2/16        Appreciate IM consultants medical co-management  Labs, medications, and imaging personally reviewed  ROS:  A ten point review of systems was completed on 03/02/23 and pertinent positives are listed in subjective section  All other systems reviewed were negative  OBJECTIVE:   /68 (BP Location: Left arm)   Pulse 78   Temp 97 8 °F (36 6 °C) (Oral)   Resp 18   Ht 5' 3\" (1 6 m)   Wt 57 6 kg (126 lb 15 8 oz)   SpO2 97%   BMI 22 49 kg/m²     Physical Exam  Vitals and nursing note reviewed  Constitutional:       General: She is not in acute distress  HENT:      Head: Atraumatic  Comments: Left-sided facial droop     Right Ear: External ear normal       Left Ear: External ear normal       Nose: Nose normal  No rhinorrhea  Mouth/Throat:      Mouth: Mucous membranes are moist       Pharynx: Oropharynx is clear  Eyes:      General: No scleral icterus  Cardiovascular:      Rate and Rhythm: Normal rate  Pulses: Normal pulses  Heart sounds: Normal heart sounds     Pulmonary:      Effort: Pulmonary " effort is normal  No respiratory distress  Breath sounds: No wheezing, rhonchi or rales  Abdominal:      General: There is no distension  Palpations: Abdomen is soft  Musculoskeletal:      Right lower leg: No edema  Left lower leg: No edema  Skin:     General: Skin is warm and dry  Neurological:      Mental Status: She is alert and oriented to person, place, and time  Comments: Left-sided hemiparesis and dysmetria    Dysarthria but intelligible conversation   Psychiatric:         Mood and Affect: Mood normal          Behavior: Behavior normal           Lab Results   Component Value Date    WBC 9 83 03/02/2023    HGB 13 5 03/02/2023    HCT 41 3 03/02/2023    MCV 99 (H) 03/02/2023     03/02/2023     Lab Results   Component Value Date    SODIUM 138 03/02/2023    K 4 2 03/02/2023     03/02/2023    CO2 31 03/02/2023    BUN 14 03/02/2023    CREATININE 0 91 03/02/2023    GLUC 107 03/02/2023    CALCIUM 10 6 (H) 03/02/2023     Lab Results   Component Value Date    INR 0 95 02/22/2023    INR 0 90 02/12/2023    INR 1 12 04/22/2022    PROTIME 12 8 02/22/2023    PROTIME 12 1 02/12/2023    PROTIME 14 0 04/22/2022           Current Facility-Administered Medications:   •  acetaminophen (TYLENOL) tablet 650 mg, 650 mg, Oral, Q6H PRN, Illona Erp, DO, 650 mg at 03/01/23 1455  •  albuterol (PROVENTIL HFA,VENTOLIN HFA) inhaler 2 puff, 2 puff, Inhalation, Q6H PRN, Illona Erp, DO  •  aluminum-magnesium hydroxide-simethicone (MYLANTA) oral suspension 30 mL, 30 mL, Oral, Q4H PRN, Illona Erp, DO, 30 mL at 03/01/23 3454  •  apixaban (ELIQUIS) tablet 5 mg, 5 mg, Oral, BID, Illona Erp, DO, 5 mg at 03/02/23 0026  •  atorvastatin (LIPITOR) tablet 40 mg, 40 mg, Oral, QPM, Illona Erp, DO, 40 mg at 03/01/23 1741  •  buPROPion [facility]) tablet 100 mg, 100 mg, Oral, BID, Nilo Sharp, DO, 100 mg at 03/02/23 0803  •  DULoxetine (CYMBALTA) delayed release capsule 60 mg, 60 mg, Oral, BID, Meghan Martínez DO Soheila, 60 mg at 03/02/23 4503  •  gabapentin (NEURONTIN) capsule 300 mg, 300 mg, Oral, BID, Klaudia Fonseca DO, 300 mg at 03/02/23 7618  •  glycerin-hypromellose- (ARTIFICIAL TEARS) ophthalmic solution 1 drop, 1 drop, Both Eyes, Q4H PRN, Klaudia Fonseca DO  •  hydroxychloroquine (PLAQUENIL) tablet 200 mg, 200 mg, Oral, Once per day on Mon Tue Wed Thu Fri, Carloz Parnell DO, 200 mg at 03/02/23 4101  •  [START ON 3/4/2023] hydroxychloroquine (PLAQUENIL) tablet 400 mg, 400 mg, Oral, Once per day on Sun Sat, Carloz Parnell DO  •  lamoTRIgine (LaMICtal) tablet 150 mg, 150 mg, Oral, HS, Klaudia Fonseca DO, 150 mg at 03/01/23 2109  •  lidocaine (LIDODERM) 5 % patch 1 patch, 1 patch, Topical, Daily, Kobi Camarena MD, 1 patch at 03/02/23 0825  •  menthol-methyl salicylate (BENGAY) 06-92 % cream, , Apply externally, 4x Daily PRN, Kobi Camarena MD, Given at 03/01/23 2110  •  metoprolol succinate (TOPROL-XL) 24 hr tablet 25 mg, 25 mg, Oral, BID, MATTI WhittingtonNP, 25 mg at 03/01/23 1741  •  morphine (MS CONTIN) ER tablet 15 mg, 15 mg, Oral, BID, Klaudia Fonseca DO, 15 mg at 03/02/23 5332  •  ondansetron (ZOFRAN-ODT) dispersible tablet 4 mg, 4 mg, Oral, Q6H PRN, Klaudia Fonseca DO, 4 mg at 03/01/23 1956  •  polyethylene glycol (MIRALAX) packet 17 g, 17 g, Oral, Daily PRN, Klaudia Fonseca DO  •  senna-docusate sodium (SENOKOT S) 8 6-50 mg per tablet 1 tablet, 1 tablet, Oral, BID, Klaudia Fonseca DO, 1 tablet at 03/02/23 3009    Past Medical History:   Diagnosis Date   • Anxiety    • Asthma    • Bipolar depression ([address] )    • Chronic narcotic dependence (HCC)    • Chronic pain    • Depression    • Enterovirus heart infection    • SHAR (obstructive sleep apnea)    • Osteoarthritis    • Peripheral neuropathy    • Plantar fasciitis of right foot    • Senile osteoporosis    • Seronegative arthropathy of multiple sites [facility])    • Undifferentiated connective tissue disease [facility])        Patient Active Problem List    Diagnosis Date Noted   • Acute CVA [facility]ce[facility] accident) ([facility] ) 02/23/2023   • Obstructive sleep apnea 02/28/2023   • History of stroke 02/28/2023   • Bacteremia 02/24/2023   • Hypercalcemia 02/22/2023   • Discoloration of skin of foot 02/22/2023   • Abdominal pain 02/22/2023   • HFrEF (heart failure with reduced ejection fraction) ([facility] ) 02/13/2023   • Stroke ([facility] ) 02/12/2023   • HTN (hypertension) 02/12/2023   • Cardiomyopathy ([facility] ) 02/07/2023   • Vitamin D insufficiency 02/07/2023   • LAILA (acute kidney injury) ([facility] ) 04/23/2022   • Polypharmacy 04/23/2022   • 'light-for-dates' infant with signs of fetal malnutrition 04/22/2022   • Bipolar depression ([facility] ) 03/04/2022   • Rheumatoid arthritis ([place] [address] ) 03/04/2022   • Closed fracture of multiple ribs of right side 03/02/2022   • Traumatic pneumothorax 03/02/2022   • Closed fracture of transverse process of lumbar vertebra ([facility] ) 03/02/2022   • Liver contusion 03/02/2022   • Fall 03/02/2022   • Acute pain due to trauma 03/02/2022   • Right shoulder pain 03/02/2022   • Severe protein-calorie malnutrition ([facility] ) 03/02/2022   • Undifferentiated connective tissue disease ([place] [address] ) 12/29/2021   • Primary generalized (osteo)arthritis 12/29/2021   • Peripheral neuropathy 12/29/2021   • Osteoporosis 12/29/2021   • Hx of compression fracture of spine 12/29/2021   • Lumbar spondylosis 12/29/2021   • Cervical spondylosis 12/29/2021   • Anemia 06/26/2021   • Chronic pain syndrome    • Asthma    • Ambulatory dysfunction    • Seronegative arthropathy of multiple sites ([facility] )    • Gastroesophageal reflux disease without esophagitis    • Mixed stress and urge urinary incontinence    • CHF (congestive heart failure) ([place] [address] )           Agustín Wilkinson DO  Physical Medicine and Priyanka Rice    I have spent a total time of 35 minutes on 03/02/23 in caring for this patient including Risks and benefits of tx options, Instructions for management, Patient and family education, Importance of tx compliance, Risk factor reductions, Counseling / Coordination of care, Documenting in the medical record and Communicating with other healthcare professionals   Additional time spent on family education with patient and brother and reviewing her experience on therapy evaluations

## 2023-03-02 NOTE — PLAN OF CARE
Problem: PAIN - ADULT  Goal: Verbalizes/displays adequate comfort level or baseline comfort level  Description: Interventions:  - Encourage patient to monitor pain and request assistance  - Assess pain using appropriate pain scale  - Administer analgesics based on type and severity of pain and evaluate response  - Implement non-pharmacological measures as appropriate and evaluate response  - Consider cultural and social influences on pain and pain management  - Notify physician/advanced practitioner if interventions unsuccessful or patient reports new pain  Outcome: Progressing     Problem: INFECTION - ADULT  Goal: Absence or prevention of progression during hospitalization  Description: INTERVENTIONS:  - Assess and monitor for signs and symptoms of infection  - Monitor lab/diagnostic results  - Monitor all insertion sites, i e  indwelling lines, tubes, and drains  - Monitor endotracheal if appropriate and nasal secretions for changes in amount and color  - [place] appropriate cooling/warming therapies per order  - Administer medications as ordered  - Instruct and encourage patient and family to use good hand hygiene technique  - Identify and instruct in appropriate isolation precautions for identified infection/condition  Outcome: Progressing  Goal: Absence of fever/infection during neutropenic period  Description: INTERVENTIONS:  - Monitor WBC    Outcome: Progressing     Problem: SAFETY ADULT  Goal: Patient will remain free of falls  Description: INTERVENTIONS:  - Educate patient/family on patient safety including physical limitations  - Instruct patient to call for assistance with activity   - Consult OT/PT to assist with strengthening/mobility   - Keep Call bell within reach  - Keep bed low and locked with side rails adjusted as appropriate  - Keep care items and personal belongings within reach  - Initiate and maintain comfort rounds  - Make Fall Risk Sign visible to staff  - Offer Toileting every 2 Hours, in advance of need  - Initiate/Maintain bed  chair alarm  - Obtain necessary fall risk management equipment: nonskid socks  - Apply yellow socks and bracelet for high fall risk patients  - Consider moving patient to room near nurses station  Outcome: Progressing  Goal: Maintain or return to baseline ADL function  Description: INTERVENTIONS:  -  Assess patient's ability to carry out ADLs; assess patient's baseline for ADL function and identify physical deficits which impact ability to perform ADLs (bathing, care of mouth/teeth, toileting, grooming, dressing, etc )  - Assess/evaluate cause of self-care deficits   - Assess range of motion  - Assess patient's mobility; develop plan if impaired  - Assess patient's need for assistive devices and provide as appropriate  - Encourage maximum independence but intervene and supervise when necessary  - Involve family in performance of ADLs  - Assess for home care needs following discharge   - Consider OT consult to assist with ADL evaluation and planning for discharge  - Provide patient education as appropriate  Outcome: Progressing  Goal: Maintains/Returns to pre admission functional level  Description: INTERVENTIONS:  - Perform BMAT or MOVE assessment daily    - Set and communicate daily mobility goal to care team and patient/family/caregiver  - Collaborate with rehabilitation services on mobility goals if consulted  - Perform Range of Motion 3 times a day  - Reposition patient every 2 hours    - Dangle patient 3 times a day  - Stand patient 3 times a day  - Ambulate patient 3 times a day  - Out of bed to chair 3 times a day   - Out of bed for meals 3 times a day  - Out of bed for toileting  - Record patient progress and toleration of activity level   Outcome: Progressing     Problem: DISCHARGE PLANNING  Goal: Discharge to home or other facility with appropriate resources  Description: INTERVENTIONS:  - Identify barriers to discharge w/patient and caregiver  - Arrange for needed discharge resources and transportation as appropriate  - Identify discharge learning needs (meds, wound care, etc )  - Arrange for interpretive services to assist at discharge as needed  - Refer to Case Management Department for coordinating discharge planning if the patient needs post-hospital services based on physician/advanced practitioner order or complex needs related to functional status, cognitive ability, or social support system  Outcome: Progressing     Problem: Nutrition/Hydration-ADULT  Goal: Nutrient/Hydration intake appropriate for improving, restoring or maintaining nutritional needs  Description: Monitor and assess patient's nutrition/hydration status for malnutrition  Collaborate with interdisciplinary team and initiate plan and interventions as ordered  Monitor patient's weight and dietary intake as ordered or per policy  Utilize nutrition screening tool and intervene as necessary  Determine patient's food preferences and provide high-protein, high-caloric foods as appropriate       INTERVENTIONS:  - Monitor oral intake, urinary output, labs, and treatment plans  - Assess nutrition and hydration status and recommend course of action  - Evaluate amount of meals eaten  - Assist patient with eating if necessary   - Allow adequate time for meals  - Recommend/ encourage appropriate diets, oral nutritional supplements, and vitamin/mineral supplements  - Order, calculate, and assess calorie counts as needed  - Recommend, monitor, and adjust tube feedings and TPN/PPN based on assessed needs  - Assess need for intravenous fluids  - Provide specific nutrition/hydration education as appropriate  - Include patient/family/caregiver in decisions related to nutrition  Outcome: Progressing     Problem: Nutrition/Hydration-ADULT  Goal: Nutrient/Hydration intake appropriate for improving, restoring or maintaining nutritional needs  Description: Monitor and assess patient's nutrition/hydration status for malnutrition  Collaborate with interdisciplinary team and initiate plan and interventions as ordered  Monitor patient's weight and dietary intake as ordered or per policy  Utilize nutrition screening tool and intervene as necessary  Determine patient's food preferences and provide high-protein, high-caloric foods as appropriate       INTERVENTIONS:  - Monitor oral intake, urinary output, labs, and treatment plans  - Assess nutrition and hydration status and recommend course of action  - Evaluate amount of meals eaten  - Assist patient with eating if necessary   - Allow adequate time for meals  - Recommend/ encourage appropriate diets, oral nutritional supplements, and vitamin/mineral supplements  - Order, calculate, and assess calorie counts as needed  - Recommend, monitor, and adjust tube feedings and TPN/PPN based on assessed needs  - Assess need for intravenous fluids  - Provide specific nutrition/hydration education as appropriate  - Include patient/family/caregiver in decisions related to nutrition  Outcome: Progressing     Problem: Prexisting or High Potential for Compromised Skin Integrity  Goal: Skin integrity is maintained or improved  Description: INTERVENTIONS:  - Identify patients at risk for skin breakdown  - Assess and monitor skin integrity  - Assess and monitor nutrition and hydration status  - Monitor labs   - Assess for incontinence   - Turn and reposition patient  - Assist with mobility/ambulation  - Relieve pressure over bony prominences  - Avoid friction and shearing  - Provide appropriate hygiene as needed including keeping skin clean and dry  - Evaluate need for skin moisturizer/barrier cream  - Collaborate with interdisciplinary team   - Patient/family teaching  - Consider wound care consult   Outcome: Progressing     Problem: MOBILITY - ADULT  Goal: Maintain or return to baseline ADL function  Description: INTERVENTIONS:  -  Assess patient's ability to carry out ADLs; assess patient's baseline for ADL function and identify physical deficits which impact ability to perform ADLs (bathing, care of mouth/teeth, toileting, grooming, dressing, etc )  - Assess/evaluate cause of self-care deficits   - Assess range of motion  - Assess patient's mobility; develop plan if impaired  - Assess patient's need for assistive devices and provide as appropriate  - Encourage maximum independence but intervene and supervise when necessary  - Involve family in performance of ADLs  - Assess for home care needs following discharge   - Consider OT consult to assist with ADL evaluation and planning for discharge  - Provide patient education as appropriate  Outcome: Progressing  Goal: Maintains/Returns to pre admission functional level  Description: INTERVENTIONS:  - Perform BMAT or MOVE assessment daily    - Set and communicate daily mobility goal to care team and patient/family/caregiver  - Collaborate with rehabilitation services on mobility goals if consulted  - Perform Range of Motion 3 times a day  - Reposition patient every 2 hours    - Dangle patient 3 times a day  - Stand patient 3 times a day  - Ambulate patient 3 times a day  - Out of bed to chair 3 times a day   - Out of bed for meals 3 times a day  - Out of bed for toileting  - Record patient progress and toleration of activity level   Outcome: Progressing

## 2023-03-02 NOTE — PROGRESS NOTES
Internal Medicine Progress Note  Patient: Jonah Franks  Age/sex: 75 y o  female  Medical Record #: [id number]      ASSESSMENT/PLAN: (Interval History)  Jonah Franks is seen and examined and management for following issues:    Recent right MCA infarct W/petechial hemorrhage  • S/P thrombectomy by Dr Sudha Hyman 2/12  • Continue Eliquis 5 mg twice daily/statin  • Follow-up with neurosurgery/neurology as scheduled     N ICM  • EF has been as low as 10 to 15% in the past  • In 2022 was 65%  • Now down to 30%  • Cardiology felt this was secondary to her recent CVA as well as possible medication noncompliance  • They recommend continuing beta-blocker and adding ARB when BP allows  • Will need outpatient ischemia work-up  • Followed by Dr Lisa Gutierrez at [facility] [place] however would like to follow with [facility] on dc     Seronegative rheumatoid arthritis  • A/T rheumatology takes Plaquenil 200 mg Monday through Friday 400 mg on Saturday and Sunday  • Continue same  • Follow-up on discharge      Chronic pain  • Due to compression fractures of her thoracic spine/chronic rib fractures  • Takes MS Contin 15 mg every 12 hours  • Has oxycodone 5 mg immediate release for breakthrough pain at home  • According to the patient she takes only 1 dose of breakthrough oxycodone per day  • PMR called last night due to pain not responsive to Tylenol  She was given a one time dose of meloxicam, Lidoderm patch and Bengay  • Pain management per PMR     Bipolar disorder  • Continue current medication regimen  • Follow-up with psychiatry on discharge  • Recommend Neuropsych consult    Urinary incontinence  · toileting regimen per PMR     SHAR  • Uses oxygen overnight      DC planning: TBD  The above assessment and plan was reviewed and updated as determined by my evaluation of the patient on 3/2/2023      Labs:   Results from last 7 days   Lab Units 02/27/23  0938 02/24/23  0422   WBC Thousand/uL 7 03 7 20   HEMOGLOBIN g/dL 13 9 13 8 HEMATOCRIT % 42 2 40 9   PLATELETS Thousands/uL 261 240     Results from last 7 days   Lab Units 02/28/23  0519 02/27/23  0938   SODIUM mmol/L 142 138   POTASSIUM mmol/L 4 2 4 6   CHLORIDE mmol/L 111* 110*   CO2 mmol/L 26 28   BUN mg/dL 15 14   CREATININE mg/dL 0 83 0 78   CALCIUM mg/dL 10 6* 10 9*                   Review of Scheduled Meds:  Current Facility-Administered Medications   Medication Dose Route Frequency Provider Last Rate   • acetaminophen  650 mg Oral Q6H PRN Rajesh Markham, DO     • albuterol  2 puff Inhalation Q6H PRN Rajesh Markham, DO     • aluminum-magnesium hydroxide-simethicone  30 mL Oral Q4H PRN Rajesh Markham, DO     • apixaban  5 mg Oral BID Rajesh Markham, DO     • atorvastatin  40 mg Oral QPM Rajesh Markham, DO     • buPROPion  100 mg Oral BID Rajesh Markham, DO     • DULoxetine  60 mg Oral BID Rajesh Markham, DO     • gabapentin  300 mg Oral BID Rajesh Markham, DO     • glycerin-hypromellose-  1 drop Both Eyes Q4H PRN Rajesh Markham, DO     • hydroxychloroquine  200 mg Oral Once per day on Mon Tue Wed Thu Fri Rajesh Ramirezt, DO     • [START ON 3/4/2023] hydroxychloroquine  400 mg Oral Once per day on Sun Sat Rajesh Ramirezt, DO     • lamoTRIgine  150 mg Oral HS Rajesh Markham, DO     • lidocaine  1 patch Topical Daily Elsy Keyes MD     • menthol-methyl salicylate   Apply externally 4x Daily PRN Elsy Keyes MD     • metoprolol succinate  25 mg Oral BID LAURA Reyes     • morphine  15 mg Oral BID Rajesh Markham, DO     • ondansetron  4 mg Oral Q6H PRN Rajesh Markham, DO     • polyethylene glycol  17 g Oral Daily PRN Rajesh Markham, DO     • senna-docusate sodium  1 tablet Oral BID Rajesh Markham, DO         Subjective/ HPI: Patient seen and examined  Patients overnight issues or events were reviewed with nursing or staff during rounds or morning huddle session  New or overnight issues include the following:     Pt seen in her room   She reports improvement in pain this AM  She would like something other than Tylenol for breakthrough  PMR aware  She denies any other complaints  ROS:   A 10 point ROS was performed; negative except as noted above  Imaging:     No orders to display       *Labs /Radiology studies Reviewed  *Medications  reviewed and reconciled as needed  *Please refer to order section for additional ordered labs studies  *Case discussed with primary attending during morning huddle case rounds    Physical Examination:  Vitals:   Vitals:    03/01/23 1342 03/01/23 1741 03/01/23 2200 03/02/23 0548   BP: 102/59 105/53 95/54 113/76   BP Location: Left arm  Left arm Right arm   Pulse: 98 105 95 91   Resp: 18  16 18   Temp: 98 2 °F (36 8 °C)  97 7 °F (36 5 °C) 97 8 °F (36 6 °C)   TempSrc: Oral  Axillary Oral   SpO2: 98%  94% 95%   Weight:       Height:           GEN: No apparent distress, interactive  NEURO: Alert and oriented x3; dysarthria  HEENT: Pupils are equal and reactive, EOMI, mucous membranes are moist, face asymmetrical  CV: S1 S2 regular, no MRG, no peripheral edema noted  RESP: Lungs are clear bilaterally, no wheezes, rales or rhonchi noted, on room air, respirations easy and non labored  GI: Flat, soft non tender, non distended; +BS x4  : Voiding without difficulty  MUSC: Moves all extremities; left hemiparesis  Some Lt neglect - pt could not find the call bell on the Lt side  SKIN: pink, warm and dry, normal turgor, no rashes, lesions      The above physical exam was reviewed and updated as determined by my evaluation of the patient on 3/2/2023  Invasive Devices     Drain  Duration           External Urinary Catheter 1 day                   VTE Pharmacologic Prophylaxis: Eliquis  Code Status: Level 1 - Full Code  Current Length of Stay: 2 day(s)      Total time spent:  30 minutes with more than 50% spent counseling/coordinating care   Counseling includes discussion with patient re: progress  and discussion with patient of his/her current medical state/information  Coordination of patient's care was performed in conjunction with primary service  Time invested included review of patient's labs, vitals, and management of their comorbidities with continued monitoring  In addition, this patient was discussed with medical team including physician and advanced extenders  The care of the patient was extensively discussed and appropriate treatment plan was formulated unique for this patient  Medical decision making for the day was made by supervising physician unless otherwise noted in their attestation statement  ** Please Note:  voice to text software may have been used in the creation of this document   Although proof errors in transcription or interpretation are a potential of such software**

## 2023-03-02 NOTE — QUICK NOTE
Called by nursing  Patient with aching pain in back and legs  Tylenol not helping  Has had issues with AMS on opioids  Reviewed kidney function, no history of GI bleed in chart  She is on Eliquis, but will give her a 1x dose of meloxicam for now and defer to her primary team in the AM on how they want to further manage this  Was going to try celecoxib - which has better side effect profile - but technically has sulfa allergy and there is a question of cross-reactivity although I don't think this has been demonstrated extensively  Monitor BP and kidney function  Added lidoderm and TERRENCE Sharp MD  Physical Medicine and Rehabilitation

## 2023-03-03 LAB
ATRIAL RATE: 99 BPM
BACTERIA UR QL AUTO: ABNORMAL /HPF
BILIRUB UR QL STRIP: NEGATIVE
CLARITY UR: ABNORMAL
COLOR UR: YELLOW
GLUCOSE UR STRIP-MCNC: NEGATIVE MG/DL
HGB UR QL STRIP.AUTO: NEGATIVE
KETONES UR STRIP-MCNC: NEGATIVE MG/DL
LEUKOCYTE ESTERASE UR QL STRIP: ABNORMAL
NITRITE UR QL STRIP: NEGATIVE
NON-SQ EPI CELLS URNS QL MICRO: ABNORMAL /HPF
P AXIS: 34 DEGREES
PH UR STRIP.AUTO: 7 [PH]
PR INTERVAL: 194 MS
PROT UR STRIP-MCNC: ABNORMAL MG/DL
QRS AXIS: 5 DEGREES
QRSD INTERVAL: 98 MS
QT INTERVAL: 348 MS
QTC INTERVAL: 446 MS
RBC #/AREA URNS AUTO: ABNORMAL /HPF
SP GR UR STRIP.AUTO: 1.02 (ref 1–1.03)
T WAVE AXIS: 118 DEGREES
UROBILINOGEN UR STRIP-ACNC: <2 MG/DL
VENTRICULAR RATE: 99 BPM
WBC #/AREA URNS AUTO: ABNORMAL /HPF

## 2023-03-03 RX ORDER — GABAPENTIN 100 MG/1
200 CAPSULE ORAL 2 TIMES DAILY
Status: DISCONTINUED | OUTPATIENT
Start: 2023-03-04 | End: 2023-03-07

## 2023-03-03 RX ADMIN — GABAPENTIN 200 MG: 100 CAPSULE ORAL at 08:21

## 2023-03-03 RX ADMIN — LIDOCAINE 5% 1 PATCH: 700 PATCH TOPICAL at 11:38

## 2023-03-03 RX ADMIN — MORPHINE SULFATE 15 MG: 15 TABLET, FILM COATED, EXTENDED RELEASE ORAL at 08:22

## 2023-03-03 RX ADMIN — BUPROPION HYDROCHLORIDE TABLETS 100 MG: 100 TABLET, FILM COATED ORAL at 08:31

## 2023-03-03 RX ADMIN — ATORVASTATIN CALCIUM 40 MG: 40 TABLET, FILM COATED ORAL at 18:04

## 2023-03-03 RX ADMIN — LAMOTRIGINE 150 MG: 100 TABLET ORAL at 23:40

## 2023-03-03 RX ADMIN — DULOXETINE HYDROCHLORIDE 60 MG: 60 CAPSULE, DELAYED RELEASE ORAL at 08:21

## 2023-03-03 RX ADMIN — SENNOSIDES AND DOCUSATE SODIUM 1 TABLET: 8.6; 5 TABLET ORAL at 08:22

## 2023-03-03 RX ADMIN — SENNOSIDES AND DOCUSATE SODIUM 1 TABLET: 8.6; 5 TABLET ORAL at 18:05

## 2023-03-03 RX ADMIN — DULOXETINE HYDROCHLORIDE 60 MG: 60 CAPSULE, DELAYED RELEASE ORAL at 23:40

## 2023-03-03 RX ADMIN — ONDANSETRON 4 MG: 4 TABLET, ORALLY DISINTEGRATING ORAL at 09:25

## 2023-03-03 RX ADMIN — MORPHINE SULFATE 15 MG: 15 TABLET, FILM COATED, EXTENDED RELEASE ORAL at 18:04

## 2023-03-03 RX ADMIN — ONDANSETRON 4 MG: 4 TABLET, ORALLY DISINTEGRATING ORAL at 18:05

## 2023-03-03 RX ADMIN — APIXABAN 5 MG: 5 TABLET, FILM COATED ORAL at 18:04

## 2023-03-03 RX ADMIN — APIXABAN 5 MG: 5 TABLET, FILM COATED ORAL at 08:22

## 2023-03-03 RX ADMIN — METOPROLOL SUCCINATE 25 MG: 25 TABLET, EXTENDED RELEASE ORAL at 08:22

## 2023-03-03 RX ADMIN — HYDROXYCHLOROQUINE SULFATE 200 MG: 200 TABLET ORAL at 08:21

## 2023-03-03 RX ADMIN — BUPROPION HYDROCHLORIDE TABLETS 100 MG: 100 TABLET, FILM COATED ORAL at 18:18

## 2023-03-03 NOTE — PROGRESS NOTES
"Occupational Therapy Treatment Note         03/03/23 9975   Pain Assessment   Pain Assessment Tool 0-10   Pain Score No Pain   Restrictions/Precautions   Precautions Bed/chair alarms;Cognitive; Fall Risk;Aspiration;Supervision on toilet/commode;O2  (dysarthria; incontinence;)   Lifestyle   Autonomy \"I feel nauseous\"   Eating   Type of Assistance Needed Supervision   Physical Assistance Level No physical assistance   Comment pt switched to full supervision for meals now  Eating CARE Score 4   Oral Hygiene   Type of Assistance Needed Physical assistance   Physical Assistance Level 25% or less   Comment PCA assists pt in end of session while seated upright in bed   Oral Hygiene CARE Score 3   Shower/Bathe Self   Type of Assistance Needed Physical assistance   Physical Assistance Level Total assistance   Comment pt incontinent of urine while seated in w/c, 2* medical status was transferred back into bed and OT completed bathing after incontinent episode at bed level  Shower/Bathe Self CARE Score 1   Lower Body Dressing   Type of Assistance Needed Physical assistance   Physical Assistance Level Total assistance   Comment pt incontinent of urine, returned to bed 2* medical status  total assist to don brief at bed level 2* urinary incontinence   Lower Body Dressing CARE Score 1   Putting On/Taking Off Footwear   Type of Assistance Needed Physical assistance   Physical Assistance Level Total assistance   Putting On/Taking Off Footwear CARE Score 1   Roll Left and Right   Type of Assistance Needed Physical assistance; Adaptive equipment   Physical Assistance Level 26%-50%   Comment + bed rails   Roll Left and Right CARE Score 3   Lying to Sitting on Side of Bed   Type of Assistance Needed Physical assistance   Physical Assistance Level 26%-50%   Lying to Sitting on Side of Bed CARE Score 3   Sit to Stand   Type of Assistance Needed Physical assistance; Adaptive equipment   Physical Assistance Level 51%-75%   Comment RW, " verbal and tactile cues for hand placement   Sit to Stand CARE Score 2   Bed-Chair Transfer   Type of Assistance Needed Physical assistance; Adaptive equipment   Physical Assistance Level 51%-75%   Comment stand pivot with RW   Chair/Bed-to-Chair Transfer CARE Score 2   Toileting Hygiene   Type of Assistance Needed Physical assistance; Adaptive equipment   Physical Assistance Level Total assistance   Comment pt incontinent of urine while seated in w/c, required total assist to be cleaned   Toileting Hygiene CARE Score 1   Toilet Transfer   Type of Assistance Needed Physical assistance; Adaptive equipment   Physical Assistance Level Total assistance   Comment completed swap out techique with mod assist x1 in front and 2nd person swap out [facility] and w/c 2* urinary urgency   Toilet Transfer CARE Score 1   Cognition   Overall Cognitive Status Impaired   Arousal/Participation Lethargic   Attention Difficulty attending to directions   Orientation Level Oriented to person;Oriented to situation   Memory Decreased recall of recent events   Following Commands Follows one step commands with increased time or repetition   Activity Tolerance   Activity Tolerance Treatment limited secondary to medical complications (Comment)   Assessment   Treatment Assessment Pt participated in skilled OT tx session  See above for further details on functional performance  At beginning of session pt incontinent of urine, and also noted elevated HR and low 02 reading on dynamap  Pt noted to have questionable reading of pulse ox at fingers, trialed 3 different pulse oxs, readings of 02 were in 60s-70s on room air, however pt did not appear in distress but did report mild SOB  Pt placed on 4 L to obtain reading of 92%  HR between 50s-120 on pulse ox  PA-C Ramonita present for apical monitor and ordering EKG to assess HR   Called for Masimo style monitor in attempt for ear lobe monitor 02, however JULISA Martin and OT unable to successfully get reading at ear lobe 2* small ear lobe  Placed on finger instead  RN/Ramonita WEAVER to assess best location for pulse ox monitor to be placed  /80  Pt was returned to bed and assisted with bathing/dressing after incontinent episode, and then PCA present to obtain EKG  Pt will continue to benefit from skilled OT intervention to address L inattention, standing balance/tolerance, L UE strength/coordination, attention, and cognitive retraining in order to maximize functional independence in ADLS, functional mobility/transfers, while decreasing burden of care  Pt left positioned in bed with call bell in reach and bed alarm on  Prognosis Good   Problem List Decreased strength;Decreased range of motion;Decreased endurance; Impaired balance;Decreased mobility; Decreased safety awareness;Decreased cognition   Barriers to Discharge Decreased caregiver support; Inaccessible home environment   Plan   Treatment/Interventions ADL retraining;Functional transfer training; Therapeutic exercise; Endurance training;Cognitive reorientation;Patient/family training;Equipment eval/education; Compensatory technique education; Bed mobility   Progress Progressing toward goals   Recommendation   OT Discharge Recommendation   (pending progress)   OT Therapy Minutes   OT Time In 0830   OT Time Out 1010   OT Total Time (minutes) 100   OT Mode of treatment - Individual (minutes) 100   OT Mode of treatment - Concurrent (minutes) 0   OT Mode of treatment - Group (minutes) 0   OT Mode of treatment - Co-treat (minutes) 0   OT Mode of Treatment - Total time(minutes) 100 minutes   OT Cumulative Minutes 250   Therapy Time missed   Time missed?  No

## 2023-03-03 NOTE — PROGRESS NOTES
03/03/23 1200   Pain Assessment   Pain Assessment Tool 0-10   Pain Score 5   Restrictions/Precautions   Precautions Aspiration;Bed/chair alarms;Cognitive; Fall Risk;Pain;Visual deficit;Supervision on toilet/commode   Comprehension   Comprehension (FIM) 3 - Needs parts of sentences repeated   Expression   Expression (FIM) 2 - Uses only simple expressions or gestures (waves, hello)   Social Interaction   Social Interaction (FIM) 1 - May be withdrawn or combative   Problem Solving   Problem solving (FIM) 1 - Solves basic problems less than 25% of time   Memory   Memory (FIM) 2 - Recognizes, recalls/performs 25-49%   Speech/Language/Cognition Assessmetn   Treatment Assessment Pt demonstrating significant lethargy and somewhat unresponsive  Assessed patient's alertness and arousal via orientation and pt not able to respond, eyes closing and breathing from mouth  SLP concerned, asked RN about any changes in status, to which she reported none and that her recent EKG was normal  Contacted internal medicine and DO who checked in with the patient  No significant results  Patient not alert for meal or further cognitive skilled ST   SLP Therapy Minutes   SLP Time In 1200   SLP Time Out 1215   SLP Total Time (minutes) 15   SLP Mode of treatment - Individual (minutes) 15   SLP Mode of treatment - Concurrent (minutes) 0   SLP Mode of treatment - Group (minutes) 0   SLP Mode of treatment - Co-treat (minutes) 0   SLP Mode of Treatment - Total time(minutes) 15 minutes   SLP Cumulative Minutes 165   Therapy Time missed   Time missed?  Yes   Amount of time missed 45   Reason for time missed Extreme fatigue   Time(s) multiple attempts made 2

## 2023-03-03 NOTE — PROGRESS NOTES
Internal Medicine Progress Note  Patient: Rocky Veloz  Age/sex: 75 y o  female  Medical Record #: [id number]      ASSESSMENT/PLAN: (Interval History)  Rocky Veloz is seen and examined and management for following issues:    Recent right MCA infarct W/petechial hemorrhage  • S/P thrombectomy by Dr Taco Martinez 2/12  • Continue Eliquis 5 mg twice daily/statin  • Follow-up with neurosurgery/neurology as scheduled     N ICM  • EF has been as low as 10 to 15% in the past  • In 2022 was 65%  • Now down to 30%  • Cardiology felt this was secondary to her recent CVA as well as possible medication noncompliance  • They recommend continuing beta-blocker and adding ARB when BP allows  • Will need outpatient ischemia work-up  • Followed by Dr Patrick Summers at [facility] however would like to follow with [facility] on dc     Seronegative rheumatoid arthritis  • A/T rheumatology takes Plaquenil 200 mg Monday through Friday 400 mg on Saturday and Sunday  • Continue same  • Follow-up on discharge      Chronic pain  • Due to compression fractures of her thoracic spine/chronic rib fractures  • Takes MS Contin 15 mg every 12 hours  • Has oxycodone 5 mg immediate release for breakthrough pain at home  • According to the patient she takes only 1 dose of breakthrough oxycodone per day  • Pain management per PMR     Bipolar disorder  • Continue current medication regimen  • Follow-up with psychiatry on discharge  • Recommend Neuropsych consult    Urinary incontinence  · toileting regimen per PMR     SHAR  • Uses oxygen overnight    Tachycardia/low O2 on pulse ox  · Some irregularity on exam   · EKG with PVCs  · O2 sats improved when hands were warmed up  O2 sats not likely to be accurate due to circulation issues  Dysuria/Urinary frequency  · PMR aware and will check a UA  DC planning: TBD  The above assessment and plan was reviewed and updated as determined by my evaluation of the patient on 3/3/2023      Labs: Results from last 7 days   Lab Units 03/02/23  0839 02/27/23  0938   WBC Thousand/uL 9 83 7 03   HEMOGLOBIN g/dL 13 5 13 9   HEMATOCRIT % 41 3 42 2   PLATELETS Thousands/uL 245 261     Results from last 7 days   Lab Units 03/02/23  0839 02/28/23  0519   SODIUM mmol/L 138 142   POTASSIUM mmol/L 4 2 4 2   CHLORIDE mmol/L 108 111*   CO2 mmol/L 31 26   BUN mg/dL 14 15   CREATININE mg/dL 0 91 0 83   CALCIUM mg/dL 10 6* 10 6*                   Review of Scheduled Meds:  Current Facility-Administered Medications   Medication Dose Route Frequency Provider Last Rate   • acetaminophen  650 mg Oral Q6H PRN Polina Carton, DO     • albuterol  2 puff Inhalation Q6H PRN Polina Carton, DO     • aluminum-magnesium hydroxide-simethicone  30 mL Oral Q4H PRN Polina Carton, DO     • apixaban  5 mg Oral BID Polina Carton, DO     • atorvastatin  40 mg Oral QPM Polina Carton, DO     • buPROPion  100 mg Oral BID Polina Carton, DO     • DULoxetine  60 mg Oral BID Polina Carton, DO     • gabapentin  200 mg Oral BID Polina Carton, DO     • gabapentin  300 mg Oral HS Polina Carton, DO     • glycerin-hypromellose-  1 drop Both Eyes Q4H PRN Polina Carton, DO     • hydroxychloroquine  200 mg Oral Once per day on Mon Tue Wed Thu Fri Polina Carton, DO     • [START ON 3/4/2023] hydroxychloroquine  400 mg Oral Once per day on Sun Sat Polina Carton, DO     • lamoTRIgine  150 mg Oral HS Polina Carton, DO     • lidocaine  1 patch Topical Daily Guera Hubbard MD     • menthol-methyl salicylate   Apply externally 4x Daily PRN Guera Hubbard MD     • metoprolol succinate  25 mg Oral BID LAURA Carrasquillo     • morphine  15 mg Oral BID Polina Carton, DO     • ondansetron  4 mg Oral Q6H PRN Polina Carton, DO     • polyethylene glycol  17 g Oral Daily Polina Carton, DO     • polyethylene glycol  17 g Oral Daily PRN Polina Carton, DO     • senna-docusate sodium  1 tablet Oral BID Polina Carton, DO         Subjective/ HPI: Patient seen and examined  Patients overnight issues or events were reviewed with nursing or staff during rounds or morning huddle session  New or overnight issues include the following:     Called by therapy due to concerns for hypoxia and tachycardia  Pt denied complaints  Pt's hands were cold and she has poor circulation  After her hands were warmed up sats were in the 90s  Pt did report some nausea which improved with Zofran  Pt also reports dysuria and urinary frequency  She has a history of UTIs as well  She denies any other complaints  ROS:   A 10 point ROS was performed; negative except as noted above  Imaging:     No orders to display       *Labs /Radiology studies Reviewed  *Medications  reviewed and reconciled as needed  *Please refer to order section for additional ordered labs studies  *Case discussed with primary attending during morning huddle case rounds    Physical Examination:  Vitals:   Vitals:    03/02/23 1759 03/02/23 2045 03/03/23 0601 03/03/23 0822   BP: 109/59 91/56  115/67   BP Location:  Right arm  Right arm   Pulse: 100 87 (!) 108 105   Resp:  16 20    Temp:  98 1 °F (36 7 °C) 97 8 °F (36 6 °C)    TempSrc:  Axillary Axillary    SpO2:  98% 96%    Weight:       Height:           GEN: No apparent distress, interactive, appears comfortable  NEURO: Alert and oriented x3; dysarthria  HEENT: Pupils are equal and reactive, EOMI, mucous membranes are moist, face asymmetrical  CV: S1 S2 regular, no MRG, no peripheral edema noted  RESP: Lungs are clear bilaterally, no wheezes, rales or rhonchi noted, on room air, respirations easy and non labored  GI: Flat, soft non tender, non distended; +BS x4  : Voiding without difficulty  MUSC: Moves all extremities; left hemiparesis  Some Lt neglect  SKIN: pink, warm and dry, normal turgor, no rashes, lesions      The above physical exam was reviewed and updated as determined by my evaluation of the patient on 3/3/2023      Invasive Devices     Drain  Duration External Urinary Catheter 2 days                   VTE Pharmacologic Prophylaxis: Eliquis  Code Status: Level 1 - Full Code  Current Length of Stay: 3 day(s)      Total time spent:  45 minutes with more than 50% spent counseling/coordinating care  Counseling includes discussion with patient re: progress  and discussion with patient of his/her current medical state/information  Coordination of patient's care was performed in conjunction with primary service  Time invested included review of patient's labs, vitals, and management of their comorbidities with continued monitoring  In addition, this patient was discussed with medical team including physician and advanced extenders  The care of the patient was extensively discussed and appropriate treatment plan was formulated unique for this patient  Medical decision making for the day was made by supervising physician unless otherwise noted in their attestation statement  ** Please Note:  voice to text software may have been used in the creation of this document   Although proof errors in transcription or interpretation are a potential of such software**

## 2023-03-03 NOTE — PLAN OF CARE
Problem: PAIN - ADULT  Goal: Verbalizes/displays adequate comfort level or baseline comfort level  Description: Interventions:  - Encourage patient to monitor pain and request assistance  - Assess pain using appropriate pain scale  - Administer analgesics based on type and severity of pain and evaluate response  - Implement non-pharmacological measures as appropriate and evaluate response  - Consider cultural and social influences on pain and pain management  - Notify physician/advanced practitioner if interventions unsuccessful or patient reports new pain  Outcome: Progressing     Problem: INFECTION - ADULT  Goal: Absence or prevention of progression during hospitalization  Description: INTERVENTIONS:  - Assess and monitor for signs and symptoms of infection  - Monitor lab/diagnostic results  - Monitor all insertion sites, i e  indwelling lines, tubes, and drains  - Monitor endotracheal if appropriate and nasal secretions for changes in amount and color  - [place] appropriate cooling/warming therapies per order  - Administer medications as ordered  - Instruct and encourage patient and family to use good hand hygiene technique  - Identify and instruct in appropriate isolation precautions for identified infection/condition  Outcome: Progressing  Goal: Absence of fever/infection during neutropenic period  Description: INTERVENTIONS:  - Monitor WBC    Outcome: Progressing     Problem: SAFETY ADULT  Goal: Patient will remain free of falls  Description: INTERVENTIONS:  - Educate patient/family on patient safety including physical limitations  - Instruct patient to call for assistance with activity   - Consult OT/PT to assist with strengthening/mobility   - Keep Call bell within reach  - Keep bed low and locked with side rails adjusted as appropriate  - Keep care items and personal belongings within reach  - Initiate and maintain comfort rounds  - Make Fall Risk Sign visible to staff  - Offer Toileting every 2 Hours, in advance of need  - Initiate/Maintain bed/chair alarm  - Obtain necessary fall risk management equipment: alarms  - Apply yellow socks and bracelet for high fall risk patients  - Consider moving patient to room near nurses station  Outcome: Progressing  Goal: Maintain or return to baseline ADL function  Description: INTERVENTIONS:  -  Assess patient's ability to carry out ADLs; assess patient's baseline for ADL function and identify physical deficits which impact ability to perform ADLs (bathing, care of mouth/teeth, toileting, grooming, dressing, etc )  - Assess/evaluate cause of self-care deficits   - Assess range of motion  - Assess patient's mobility; develop plan if impaired  - Assess patient's need for assistive devices and provide as appropriate  - Encourage maximum independence but intervene and supervise when necessary  - Involve family in performance of ADLs  - Assess for home care needs following discharge   - Consider OT consult to assist with ADL evaluation and planning for discharge  - Provide patient education as appropriate  Outcome: Progressing  Goal: Maintains/Returns to pre admission functional level  Description: INTERVENTIONS:  - Perform BMAT or MOVE assessment daily    - Set and communicate daily mobility goal to care team and patient/family/caregiver  - Collaborate with rehabilitation services on mobility goals if consulted  - Perform Range of Motion 3 times a day  - Reposition patient every 2 hours    - Dangle patient 3 times a day  - Stand patient 3 times a day  - Ambulate patient 3 times a day  - Out of bed to chair 3 times a day   - Out of bed for meals 3 times a day  - Out of bed for toileting  - Record patient progress and toleration of activity level   Outcome: Progressing     Problem: DISCHARGE PLANNING  Goal: Discharge to home or other facility with appropriate resources  Description: INTERVENTIONS:  - Identify barriers to discharge w/patient and caregiver  - Arrange for needed discharge resources and transportation as appropriate  - Identify discharge learning needs (meds, wound care, etc )  - Arrange for interpretive services to assist at discharge as needed  - Refer to Case Management Department for coordinating discharge planning if the patient needs post-hospital services based on physician/advanced practitioner order or complex needs related to functional status, cognitive ability, or social support system  Outcome: Progressing     Problem: Nutrition/Hydration-ADULT  Goal: Nutrient/Hydration intake appropriate for improving, restoring or maintaining nutritional needs  Description: Monitor and assess patient's nutrition/hydration status for malnutrition  Collaborate with interdisciplinary team and initiate plan and interventions as ordered  Monitor patient's weight and dietary intake as ordered or per policy  Utilize nutrition screening tool and intervene as necessary  Determine patient's food preferences and provide high-protein, high-caloric foods as appropriate       INTERVENTIONS:  - Monitor oral intake, urinary output, labs, and treatment plans  - Assess nutrition and hydration status and recommend course of action  - Evaluate amount of meals eaten  - Assist patient with eating if necessary   - Allow adequate time for meals  - Recommend/ encourage appropriate diets, oral nutritional supplements, and vitamin/mineral supplements  - Order, calculate, and assess calorie counts as needed  - Recommend, monitor, and adjust tube feedings and TPN/PPN based on assessed needs  - Assess need for intravenous fluids  - Provide specific nutrition/hydration education as appropriate  - Include patient/family/caregiver in decisions related to nutrition  Outcome: Progressing     Problem: Prexisting or High Potential for Compromised Skin Integrity  Goal: Skin integrity is maintained or improved  Description: INTERVENTIONS:  - Identify patients at risk for skin breakdown  - Assess and monitor skin integrity  - Assess and monitor nutrition and hydration status  - Monitor labs   - Assess for incontinence   - Turn and reposition patient  - Assist with mobility/ambulation  - Relieve pressure over bony prominences  - Avoid friction and shearing  - Provide appropriate hygiene as needed including keeping skin clean and dry  - Evaluate need for skin moisturizer/barrier cream  - Collaborate with interdisciplinary team   - Patient/family teaching  - Consider wound care consult   Outcome: Progressing     Problem: MOBILITY - ADULT  Goal: Maintain or return to baseline ADL function  Description: INTERVENTIONS:  -  Assess patient's ability to carry out ADLs; assess patient's baseline for ADL function and identify physical deficits which impact ability to perform ADLs (bathing, care of mouth/teeth, toileting, grooming, dressing, etc )  - Assess/evaluate cause of self-care deficits   - Assess range of motion  - Assess patient's mobility; develop plan if impaired  - Assess patient's need for assistive devices and provide as appropriate  - Encourage maximum independence but intervene and supervise when necessary  - Involve family in performance of ADLs  - Assess for home care needs following discharge   - Consider OT consult to assist with ADL evaluation and planning for discharge  - Provide patient education as appropriate  Outcome: Progressing  Goal: Maintains/Returns to pre admission functional level  Description: INTERVENTIONS:  - Perform BMAT or MOVE assessment daily    - Set and communicate daily mobility goal to care team and patient/family/caregiver  - Collaborate with rehabilitation services on mobility goals if consulted  - Perform Range of Motion 3 times a day  - Reposition patient every 2 hours    - Dangle patient 3 times a day  - Stand patient 3 times a day  - Ambulate patient 3 times a day  - Out of bed to chair 3 times a day   - Out of bed for meals 3 times a day  - Out of bed for toileting  - Record patient progress and toleration of activity level   Outcome: Progressing     Problem: Nutrition/Hydration-ADULT  Goal: Nutrient/Hydration intake appropriate for improving, restoring or maintaining nutritional needs  Description: Monitor and assess patient's nutrition/hydration status for malnutrition  Collaborate with interdisciplinary team and initiate plan and interventions as ordered  Monitor patient's weight and dietary intake as ordered or per policy  Utilize nutrition screening tool and intervene as necessary  Determine patient's food preferences and provide high-protein, high-caloric foods as appropriate       INTERVENTIONS:  - Monitor oral intake, urinary output, labs, and treatment plans  - Assess nutrition and hydration status and recommend course of action  - Evaluate amount of meals eaten  - Assist patient with eating if necessary   - Allow adequate time for meals  - Recommend/ encourage appropriate diets, oral nutritional supplements, and vitamin/mineral supplements  - Order, calculate, and assess calorie counts as needed  - Recommend, monitor, and adjust tube feedings and TPN/PPN based on assessed needs  - Assess need for intravenous fluids  - Provide specific nutrition/hydration education as appropriate  - Include patient/family/caregiver in decisions related to nutrition  Outcome: Progressing

## 2023-03-03 NOTE — PROGRESS NOTES
"   03/03/23 1105   Pain Assessment   Pain Assessment Tool 0-10   Pain Score 3   Pain Location/Orientation Orientation: Lower; Location: Back   Restrictions/Precautions   Precautions Aspiration;Bed/chair alarms;Cognitive; Fall Risk;Pain;Supervision on toilet/commode;Visual deficit  (dysarthria)   Weight Bearing Restrictions No   ROM Restrictions No   Lifestyle   Autonomy \"I am so tired, I don't know why  \"   Upper Body Dressing   Type of Assistance Needed Adaptive equipment;Verbal cues; Physical assistance   Physical Assistance Level 76% or more   Comment chair position in bed, A to thread LUE, pt able to A w/ pulling OH w/ inc time and vc's for initiation  A to fully thread RUE  Pt able to partially pull down back, A to fully manage  Upper Body Dressing CARE Score 2   Lower Body Dressing   Type of Assistance Needed Physical assistance;Verbal cues   Physical Assistance Level Total assistance   Comment bed level 2* lethargy, A to thread BLE  Min A for alternating logrolling w/ A for clothing management  Lower Body Dressing CARE Score 1   Putting On/Taking Off Footwear   Type of Assistance Needed Physical assistance   Physical Assistance Level Total assistance   Comment A to don/doff socks  Putting On/Taking Off Footwear CARE Score 1   Cognition   Overall Cognitive Status Impaired   Comments lethargy, req ongoing stimuli to inc alertness and keep eyes open, espeically at end of session  Activity Tolerance   Medical Staff Made Aware Spot checked SpO2 t/o session, >93% on RA HR ranged  at rest and w/ light act  A w/ donning O2 at end of session  RN made aware of inc lethargy and fatigue  Assessment   Treatment Assessment Pt seen for skilled OT session focusing on UB/LB dressing and repositioning  Upon arrival, pt w/ R lateral lean and pt reporting attempting to reach for items on tray table w/ LUE however to weakness/impaired coordination often drops items   Pt agreeable to dressing, however limited by inc " lethargy compared to yesterday, completed at bed level to inc safety  Pt req ongoing stimuli to inc arousal  Spot checked SpO2 on O2 and RA >93%  A w/ repositioning in chair mode in bed, using foam wedges and pillow props  A w/ donning O2 at end of session 2* high anticipation for pt to sleep  RN aware of lethargy, team made aware of same  Cont POC: LUE NMR, fxnl stand pivot xfers w/ RW, endurance work, fxnl standing tolerance, fxnl cognitive retraining, ADL retraining, and repetitive safety training  Pt was left resting in bed, all needs within reach and alarm activated  Prognosis Good   Problem List Decreased strength;Decreased range of motion;Decreased endurance; Impaired balance;Decreased mobility; Decreased safety awareness;Decreased cognition   Plan   Treatment/Interventions ADL retraining;Functional transfer training; Therapeutic exercise; Endurance training;Patient/family training;Cognitive reorientation   Progress Progressing toward goals   Recommendation   OT Discharge Recommendation   (pending progress)   OT Therapy Minutes   OT Time In 1105   OT Time Out 1145   OT Total Time (minutes) 40   OT Mode of treatment - Individual (minutes) 40   OT Mode of treatment - Concurrent (minutes) 0   OT Mode of treatment - Group (minutes) 0   OT Mode of treatment - Co-treat (minutes) 0   OT Mode of Treatment - Total time(minutes) 40 minutes   OT Cumulative Minutes 290   Therapy Time missed   Time missed?  No

## 2023-03-03 NOTE — PROGRESS NOTES
03/03/23 1400   Pain Assessment   Pain Assessment Tool 0-10   Pain Score 8   Pain Location/Orientation Orientation: Right;Location: Shoulder   Pain Onset/Description Onset: Ongoing;Frequency: Constant/Continuous   Hospital Pain Intervention(s) Repositioned;Relaxation technique; Rest  (RN notified)   Restrictions/Precautions   Precautions Aspiration;Bed/chair alarms;Cognitive; Fall Risk;Supervision on toilet/commode;Pain;Visual deficit  (dysarthria, L side inattention)   Cognition   Overall Cognitive Status Impaired   Arousal/Participation Alert; Cooperative   Attention Difficulty attending to directions   Subjective   Subjective pt awake in bed talking to daughter who reported pt stating seeing a baby on the bed with her which pt confirmed when PT questioned pt  RN and MD notified   Sit to Lying   Comment pt stayed in recliner with alarm on at end of tx   Lying to Sitting on Side of Bed   Type of Assistance Needed Verbal cues; Adaptive equipment   Physical Assistance Level 25% or less   Comment HOB flat, no rails, VC to complete   Lying to Sitting on Side of Bed CARE Score 3   Sit to Stand   Type of Assistance Needed Physical assistance;Verbal cues; Adaptive equipment   Physical Assistance Level 51%-75%   Comment mod of 1 RW mostly to keep L UE on handle  mod-min of 1 R HHA   Sit to Stand CARE Score 2   Bed-Chair Transfer   Type of Assistance Needed Physical assistance   Physical Assistance Level 51%-75%   Comment RW or mod-min with R HHA and vc for safe chair approach   Chair/Bed-to-Chair Transfer CARE Score 2   Walk 10 Feet   Type of Assistance Needed Physical assistance;Verbal cues; Adaptive equipment   Physical Assistance Level 51%-75%   Comment mod of 1 with RW x 50' x 2 reps , min-mod A with R HHA or hands on hips guarding x 50' x 4 reps   Walk 10 Feet CARE Score 2   Walk 50 Feet with Two Turns   Type of Assistance Needed Physical assistance;Verbal cues; Adaptive equipment   Physical Assistance Level 51%-75% Comment SpO2 on RA after 50' walk 92% ,    Walk 50 Feet with Two Turns CARE Score 2   Walking 10 Feet on Uneven Surfaces   Type of Assistance Needed Physical assistance;Verbal cues   Physical Assistance Level 51%-75%   Comment mod of 1 R HHA x 4 reps over floor mat, max VC for safe placement yvette stepping on/off mat when leading with left LE   Walking 10 Feet on Uneven Surfaces CARE Score 2   Wheel 50 Feet with Two Turns   Type of Assistance Needed Physical assistance;Verbal cues   Physical Assistance Level 51%-75%   Wheel 50 Feet with Two Turns CARE Score 2   Wheel 150 Feet   Type of Assistance Needed Physical assistance;Verbal cues   Physical Assistance Level 51%-75%   Comment bilat LE and R UE only, max VC to scan environment with full head turns vs eye movement only to avoid hitting wall, person, furniture on the left side- no carry over at this time so will benefit from additional training   Wheel 150 Feet CARE Score 2   Curb or Single Stair   Style negotiated Single stair   Type of Assistance Needed Adaptive equipment;Physical assistance;Verbal cues   Physical Assistance Level 76% or more   Comment max of 1 ascent/descent fwd facing bilat HR x 4 reps vs mod of 1 descent bwd bilat HR x 8 reps  max VC/assist to keep L hand  on rail  again demo unsafe foot placement with left LE  reciprocal pattern ascending, non reciprocal descending bwd   1 Step (Curb) CARE Score 2   4 Steps   Type of Assistance Needed Physical assistance;Verbal cues; Adaptive equipment   Physical Assistance Level 76% or more   4 Steps CARE Score 2   12 Steps   Reason if not Attempted Safety concerns   12 Steps CARE Score 88   Toilet Transfer   Type of Assistance Needed Physical assistance;Verbal cues; Adaptive equipment   Physical Assistance Level 51%-75%   Comment mod of 1 SPT with grab bar   Toilet Transfer CARE Score 2   Therapeutic Interventions   Flexibility gentle bilat hamstring stretch x 2 mins, bilat gastroc  x 30 SH x 3 reps Assessment   Treatment Assessment Pt initially on 3L for she just woke up with SpO2 at 99% and , RN notified and agreed to re-trial pt off Supplemental O2  On RA pt's SpO2 at 97% and CA 91-92, see above info for details of therapy session  Pt did better with HHA or hands on hips guarding during mobilities this session vs when using the RW due to ongoing L side inattention,visual deficit, hemiparesis demos difficulty keeping  on the walker handle   PT changed transfer status with nursing at this time from  to [facility] on R SPT only  PT/OT also downgraded overall goals from Set-up/DS to S/assist as agreed with pt's daughter  PT will cont to work on mobility training and NPP/NMR training without using an AD while monitoring SpO2/CA  although will still cont to work on w/c mobility training to improve indep & safety as alternative mode of mobility for pt at [facility]   Family/Caregiver Present yes   PT Family training done with: daughter Maxim Slaughter observed initial part of PT session  discussed anticipated mobility recommendation for pt to have S/assist with all mobilities HHA vs w/c level at d/ due to ongoing deficits yvette with impaired cog & safety, visual deficits and L side inattention  per daughter she will ensure her brother and uncle to come in for hands on FT and pt to have first floor set up  although of note, daughter using [facility] for mobility citing knee impairment but she feels she could provide appropriate assist to the pt as needed  Barriers to Discharge Inaccessible home environment;Decreased caregiver support   Barriers to Discharge Comments 1 TAB, FF to access full bedroom  full bath on first floor   PT Barriers   Physical Impairment Decreased strength;Decreased endurance; Impaired balance;Decreased mobility; Decreased safety awareness; Impaired judgement;Decreased cognition; Impaired vision; Impaired sensation   Plan   Treatment/Interventions Functional transfer training;LE strengthening/ROM; Therapeutic exercise; Endurance training;Bed mobility;Gait training;Equipment eval/education;Spoke to MD;Spoke to nursing;Spoke to advanced practitioner;OT;ST   Progress Progressing toward goals   Recommendation   PT Discharge Recommendation Home with home health rehabilitation   PT Therapy Minutes   PT Time In 1400   PT Time Out 1530   PT Total Time (minutes) 90   PT Mode of treatment - Individual (minutes) 90   PT Mode of treatment - Concurrent (minutes) 0   PT Mode of treatment - Group (minutes) 0   PT Mode of treatment - Co-treat (minutes) 0   PT Mode of Treatment - Total time(minutes) 90 minutes   PT Cumulative Minutes 280   Therapy Time missed   Time missed?  No

## 2023-03-03 NOTE — PROGRESS NOTES
PM&R PROGRESS NOTE:  Amaris Dawson 75 y o  female MRN: [id number]  Unit/Bed#: -01 Encounter: [id number]      Rehabilitation Diagnosis: Impairment of mobility, safety, Activities of Daily Living (ADLs), and cognitive/communication skills due to Stroke:  01 1  Left Body Involvement (Right Brain)    HPI: Amaris Dawson is a 75 y o  female with history of asthma, anxiety, bipolar depression, chronic narcotic use, previous enteroviral infection of the heart, seronegative arthropathy of multiple joints, undifferentiated connective tissue disorder, obstructive sleep apnea on home CPAP who presented to the [facility] on 2/22 for altered mental status  According to the patient's daughter on chart review, the patient's mental status had been declining for the prior 48 hours to admission  She did receive Narcan which improved her mental status slightly  Of note she had a recent stroke with revascularization on 2/12/2023 with Dr Randall Velasquez  She was seen by neurology with overall good strength except mildly weak in the lower extremities with brisk reflexes on the left and a positive Haroon's  At this time is felt to be potentially related to polypharmacy in the setting of chronic pain meds versus seizure in the setting of a recent stroke  She was initiated on 81 mg of aspirin daily with a plan of potentially transitioning to Eliquis, plan for EEG  CT of the head was negative  MRI was completed showing a new small acute/recent interval lacunar infarction in the right cerebral pedun there is jacinta  She was started on Eliquis  Nephrology was consulted as well for hypercalcemia  The patient was evaluated by the Rehabilitation team and deemed an appropriate candidate for comprehensive inpatient rehabilitation and admitted to the [place] on 2/28/2023  3:41 PM patient was seen in room with brother at bedside and all questions regarding the acute inpatient rehabilitation program were answered  We also discussed at length course in the hospital and her last hospitalization to verify events and clarify any imaging or laboratory findings  Patient is eager to start her therapy program       SUBJECTIVE: Patient seen face-to-face  No acute events overnight however seems a bit more tired today as noted in therapy sessions including with speech therapy  Therapy thought she was hypoxic due to a low pulse oximeter reading however due to her Raynaud's-like condition it was felt that this was highly inaccurate  However the patient does remain slightly more somnolent but is answering all questions appropriately and was evaluated at the bedside by myself as well as the internal medicine team   We will order a urinalysis with reflex to culture as a first step and alter her medications  I decreased her gabapentin from 200 - 200 - 300 down to 200 twice daily  However if continues to decline or continues to have issues over the weekend would recommend a CT of the head without contrast for further evaluation  Patient denies fever, chills, nausea, emesis, cough, shortness of breath, diarrhea, or constipation  Sleep was impaired overnight due to low back pain which has been chronic, mood stable  ASSESSMENT: Stable, progressing      PLAN:    Rehabilitation  • Functional deficits:  Self care and mobility  • Continue current rehabilitation plan of care to maximize function  • Functional update:   o PT: Bed mobility min to moderate assistance, total assistance for transfers  o OT: Total assist for oral hygiene, set up for eating, total assist for bathing, mod to max assist for dressing, total assist for footwear  o SLP: Moderately impaired at the word/phrase/sentence/conversational level  • Estimated Discharge:  To be determined in team conference    DVT prophylaxis  • On Eliquis     Pain  • Cymbalta 60 mg twice daily  • Gabapentin 200 mg twice daily  • Morphine MS Contin 15 mg twice daily  • Acetaminophen 650 mg every 6 hours as needed     Bladder plan  • Continent     Bowel plan  • Continent  • Last bowel movement 2/28  • Added MiraLAX daily in addition to as needed, also on senna and Colace currently encourage as needed use  Code Status  • Level 1 full code      * Acute CVA (cerebrovascular accident) [facility])  Assessment & Plan  Patient presents with altered mental status found to have acute stroke in the right cerebral peduncle  Currently with left hemiparesis and dysarthria likely compounded from prior stroke as well as new stroke  Currently on a dysphagia 1 diet with thin liquids  Review cognitive testing with OT and speech  Secondary stroke prophylaxis at this time with Eliquis 5 mg twice daily and discontinuation of aspirin  PT, OT, SLP    Consideration for neuropsychology if patient agreeable  Review modifiable risk factors and provide stroke education  Monitor for poststroke pain  Close neuro follow-up as patient has had multiple strokes in the last 2 months    History of stroke  Assessment & Plan  Patient with recent admission in early 2023 for right MCA CVA with petechial hemorrhage and residual left-sided weakness and dysarthria  Patient had mechanical thrombectomy with Dr Phylicia Orozco on 2/12    Obstructive sleep apnea  Assessment & Plan  Continue CPAP use nightly  Setting of FiO2 to 30%  Okay to use oxygen as she is refusing CPAP use    Bacteremia  Assessment & Plan  Unclear etiology initially of whether her altered mental status was related to stroke versus polypharmacy versus opioid overdose versus infection versus seizure  1/2 micrococcus with a second set staph coag negative and followed by infectious disease after course of antibiotic treatment  Has been off of antibiotics and repeat blood cultures negative to date, ID felt may be contaminant    Discoloration of skin of foot  Assessment & Plan  Per daughter and patient patient's foot will become blue or red and change colors frequently  No evidence for infectious etiology and had a lower extremity arterial duplex with no evidence of any significant arterial occlusive disease  May be related to the seronegative spondyloarthropathies  Likely the source of of the inaccurate and inconsistent pulse oximetry readings in the hands as well    Hypercalcemia  Assessment & Plan  Intermittent elevations of calcium level  Nephrology has been following, nonsuppressed PTH with concerns for primary hyperparathyroidism  Endocrine consult also initiated status post SPEP with no monoclonal bands and normal vitamin D levels  Status post a CT of the chest abdomen and pelvis with no overt malignancy  Per nephrology consider Sensipar if calcium rises above 11 and avoid calcium supplementation  Endocrine outpatient follow-up    HTN (hypertension)  Assessment & Plan  Currently on metoprolol succinate 25 mg twice daily  Plan per cardiology did not add an ARB in the setting of heart failure however as blood pressure allows    Bipolar depression ([facility] [address] )  Assessment & Plan  Currently on Wellbutrin 100 mg twice daily, Lamictal 150 mg nightly, and Cymbalta 60 mg twice daily  Consistent with home regimen  Continue monitoring for stability, mood lability    Consider neuropsychology consultation if patient agreeable    Osteoporosis  Assessment & Plan  History of an L1 compression fracture status post kyphoplasty  Was previously on Prolia has not been on since 2020  Outpatient referral to endocrine    CHF [facility]congest[facility] failure) ([facility] )  Assessment & Plan  Wt Readings from Last 3 Encounters:   03/01/23 57 6 kg (126 lb 15 8 oz)   02/16/23 62 6 kg (138 lb)   02/12/23 67 9 kg (149 lb 11 1 oz)       Cardiomyopathy with reduced ejection fraction as low as 10 to 15% back in 2004 but has improved since and most recently was 25 to 30% on last admission  Patient was offered LifeVest but declined  On metoprolol succinate 25 mg twice daily, with plan to add an ARB as her blood pressure would allow  Strict I's "and O's and daily weights  Follows with Dr Jimi Chauhan at [facility]      Seronegative arthropathy of multiple sites [facility])  [address] with rheumatology and is on Plaquenil 200 mg Monday through Friday and 400 mg on Saturday Sunday  Continue home regimen and follow-up with rheumatology on discharge    Asthma  Assessment & Plan  Continue use of albuterol inhaler 2 puffs every 6 hours as needed    Chronic pain syndrome  Assessment & Plan  History of chronic pain secondary to compression fractures in her thoracic spine and chronic rib fractures  · MS Contin 15 mg every 12 hours  · Gabapentin 300 mg twice daily  Was initially increased but cutting back due to sedation  · Cymbalta 60 milligrams twice daily  · Acetaminophen 650 mg every 6 hours as needed  · Was previously on oxycodone as needed at [facility] but per family due to concerns for polypharmacy and additional opioids beyond her morphine has not been on an acute care since 2/16  · Added Lidoderm and Bengay overnight by on-call physician  Daisy KHAN also ordered with instructions not to place over same area  Modified gabapentin from 300 mg twice daily to 200 mg twice daily with a 300 mg nightly dose for overnight neuropathic pain in the left foot on the hemiparetic side  Appreciate IM consultants medical co-management  Labs, medications, and imaging personally reviewed  ROS:  A ten point review of systems was completed on 03/03/23 and pertinent positives are listed in subjective section  All other systems reviewed were negative  OBJECTIVE:   /67 (BP Location: Right arm)   Pulse 105   Temp 97 8 °F (36 6 °C) (Axillary)   Resp 20   Ht 5' 3\" (1 6 m)   Wt 57 6 kg (126 lb 15 8 oz)   SpO2 96%   BMI 22 49 kg/m²     Physical Exam  Vitals and nursing note reviewed  Constitutional:       General: She is not in acute distress       Comments: Hyperarousal however awake and able to answer all questions appropriately " HENT:      Head: Atraumatic  Comments: Left-sided facial droop     Right Ear: External ear normal       Left Ear: External ear normal       Nose: Nose normal  No rhinorrhea  Mouth/Throat:      Mouth: Mucous membranes are moist       Pharynx: Oropharynx is clear  Eyes:      General: No scleral icterus  Cardiovascular:      Rate and Rhythm: Normal rate  Pulses: Normal pulses  Heart sounds: Normal heart sounds  Pulmonary:      Effort: Pulmonary effort is normal  No respiratory distress  Breath sounds: Normal breath sounds  Abdominal:      General: There is no distension  Palpations: Abdomen is soft  Musculoskeletal:      Right lower leg: No edema  Left lower leg: No edema  Skin:     General: Skin is warm and dry  Neurological:      Mental Status: She is oriented to person, place, and time  Comments: Left-sided hemiparesis and dysmetria    Dysarthria but intelligible conversation   Psychiatric:         Mood and Affect: Mood normal          Behavior: Behavior normal           Lab Results   Component Value Date    WBC 9 83 03/02/2023    HGB 13 5 03/02/2023    HCT 41 3 03/02/2023    MCV 99 (H) 03/02/2023     03/02/2023     Lab Results   Component Value Date    SODIUM 138 03/02/2023    K 4 2 03/02/2023     03/02/2023    CO2 31 03/02/2023    BUN 14 03/02/2023    CREATININE 0 91 03/02/2023    GLUC 107 03/02/2023    CALCIUM 10 6 (H) 03/02/2023     Lab Results   Component Value Date    INR 0 95 02/22/2023    INR 0 90 02/12/2023    INR 1 12 04/22/2022    PROTIME 12 8 02/22/2023    PROTIME 12 1 02/12/2023    PROTIME 14 0 04/22/2022           Current Facility-Administered Medications:   •  acetaminophen (TYLENOL) tablet 650 mg, 650 mg, Oral, Q6H PRN, Celestia Fleming, DO, 650 mg at 03/01/23 1455  •  albuterol (PROVENTIL HFA,VENTOLIN HFA) inhaler 2 puff, 2 puff, Inhalation, Q6H PRN, Celestia Fleming, DO  •  aluminum-magnesium hydroxide-simethicone (MYLANTA) oral suspension 30 mL, 30 mL, Oral, Q4H PRN, Lisa Gerhard, DO, 30 mL at 03/02/23 2145  •  apixaban (ELIQUIS) tablet 5 mg, 5 mg, Oral, BID, Lisa Gerhard, DO, 5 mg at 03/03/23 9172  •  atorvastatin (LIPITOR) tablet 40 mg, 40 mg, Oral, QPM, Lisa Gerhard, DO, 40 mg at 03/02/23 1757  •  buPROPion [facility]) tablet 100 mg, 100 mg, Oral, BID, Lisa Gerhard, DO, 100 mg at 03/03/23 5574  •  DULoxetine (CYMBALTA) delayed release capsule 60 mg, 60 mg, Oral, BID, Lisa Gerhard, DO, 60 mg at 03/03/23 3624  •  [START ON 3/4/2023] gabapentin (NEURONTIN) capsule 200 mg, 200 mg, Oral, BID, Lisa Gerhard, DO  •  glycerin-hypromellose- (ARTIFICIAL TEARS) ophthalmic solution 1 drop, 1 drop, Both Eyes, Q4H PRN, Lisa Gerhard, DO  •  hydroxychloroquine (PLAQUENIL) tablet 200 mg, 200 mg, Oral, Once per day on Mon Tue Wed Thu Fri, Carloz Parnell DO, 200 mg at 03/03/23 7619  •  [START ON 3/4/2023] hydroxychloroquine (PLAQUENIL) tablet 400 mg, 400 mg, Oral, Once per day on Sun Sat, Carloz Parnell DO  •  lamoTRIgine (LaMICtal) tablet 150 mg, 150 mg, Oral, HS, Lisa Gerhard, DO, 150 mg at 03/02/23 2144  •  lidocaine (LIDODERM) 5 % patch 1 patch, 1 patch, Topical, Daily, Jean Whatley MD, 1 patch at 03/03/23 1138  •  menthol-methyl salicylate (BENGAY) 12-30 % cream, , Apply externally, 4x Daily PRN, Jean Whatley MD, Given at 03/02/23 1549  •  metoprolol succinate (TOPROL-XL) 24 hr tablet 25 mg, 25 mg, Oral, BID, LAURA Morin, 25 mg at 03/03/23 7531  •  morphine (MS CONTIN) ER tablet 15 mg, 15 mg, Oral, BID, Lisa Hennessy DO, 15 mg at 03/03/23 6037  •  ondansetron (ZOFRAN-ODT) dispersible tablet 4 mg, 4 mg, Oral, Q6H PRN, Lisa Hennessy DO, 4 mg at 03/03/23 5575  •  polyethylene glycol (MIRALAX) packet 17 g, 17 g, Oral, Daily, Lisa Hennessy, DO  •  polyethylene glycol (MIRALAX) packet 17 g, 17 g, Oral, Daily PRN, Lisa Hennessy DO  •  senna-docusate sodium (SENOKOT S) 8 6-50 mg per tablet 1 tablet, 1 tablet, Oral, BID, Conception Bears DO Soheila, 1 tablet at 03/03/23 9578    Past Medical History:   Diagnosis Date   • Anxiety    • Asthma    • Bipolar depression ([facility] [address] )    • Chronic narcotic dependence ([facility] [address] )    • Chronic pain    • Depression    • Enterovirus heart infection    • SHAR (obstructive sleep apnea)    • Osteoarthritis    • Peripheral neuropathy    • Plantar fasciitis of right foot    • Senile osteoporosis    • Seronegative arthropathy of multiple sites [facility])    • Undifferentiated connective tissue disease ([facility] )        Patient Active Problem List    Diagnosis Date Noted   • Acute CVA (cerebrovascular accident) ([facility] ) 02/23/2023   • Obstructive sleep apnea 02/28/2023   • History of stroke 02/28/2023   • Bacteremia 02/24/2023   • Hypercalcemia 02/22/2023   • Discoloration of skin of foot 02/22/2023   • Abdominal pain 02/22/2023   • HFrEF (heart failure with reduced ejection fraction) ([facility] ) 02/13/2023   • Stroke ([facility] ) 02/12/2023   • HTN (hypertension) 02/12/2023   • Cardiomyopathy ([facility] ) 02/07/2023   • Vitamin D insufficiency 02/07/2023   • LAILA [facility]acute kidney [facility]) ([facility] 04/23/2022   • Polypharmacy 04/23/2022   • 'light-for-dates' infant with signs of fetal malnutrition 04/22/2022   • Bipolar depression ([facility] [address] ) 03/04/2022   • Rheumatoid arthritis ([facility] ) 03/04/2022   • Closed fracture of multiple ribs of right side 03/02/2022   • Traumatic pneumothorax 03/02/2022   • Closed fracture of transverse process of lumbar vertebra ([facility] ) 03/02/2022   • Liver contusion 03/02/2022   • Fall 03/02/2022   • Acute pain due to trauma 03/02/2022   • Right shoulder pain 03/02/2022   • Severe protein-calorie malnutrition ([facility] ) 03/02/2022   • Undifferentiated connective tissue disease ([facility] ) 12/29/2021   • Primary generalized (osteo)arthritis 12/29/2021   • Peripheral neuropathy 12/29/2021   • Osteoporosis 12/29/2021   • Hx of compression fracture of spine 12/29/2021   • Lumbar spondylosis 12/29/2021   • Cervical spondylosis 12/29/2021   • Anemia 06/26/2021   • Chronic pain syndrome    • Asthma    • Ambulatory dysfunction    • Seronegative arthropathy of multiple sites ([facility] )    • Gastroesophageal reflux disease without esophagitis    • Mixed stress and urge urinary incontinence    • CHF (congestive heart failure) ([facility] )           Pranav Vigil DO  Physical Medicine and Priyanka Rice    I have spent a total time of 55 minutes on 03/03/23 in caring for this patient including Instructions for management, Patient and family education, Impressions, Counseling / Coordination of care, Documenting in the medical record, Reviewing / ordering tests, medicine, procedures  , Communicating with other healthcare professionals  and Work-up initiated including ordering urine studies and adjusting medications and developing a plan for the ongoing work-up  Additional time spent on family education with patient and brother and reviewing her experience on therapy evaluations  Time spent discussing with the internal medicine team regarding acute work-up for new and ongoing medical issues

## 2023-03-04 RX ORDER — LANOLIN ALCOHOL/MO/W.PET/CERES
3 CREAM (GRAM) TOPICAL
Status: DISCONTINUED | OUTPATIENT
Start: 2023-03-04 | End: 2023-03-25 | Stop reason: HOSPADM

## 2023-03-04 RX ADMIN — GABAPENTIN 200 MG: 100 CAPSULE ORAL at 17:04

## 2023-03-04 RX ADMIN — SENNOSIDES AND DOCUSATE SODIUM 1 TABLET: 8.6; 5 TABLET ORAL at 09:27

## 2023-03-04 RX ADMIN — LIDOCAINE 5% 1 PATCH: 700 PATCH TOPICAL at 09:26

## 2023-03-04 RX ADMIN — SENNOSIDES AND DOCUSATE SODIUM 1 TABLET: 8.6; 5 TABLET ORAL at 17:04

## 2023-03-04 RX ADMIN — ATORVASTATIN CALCIUM 40 MG: 40 TABLET, FILM COATED ORAL at 17:04

## 2023-03-04 RX ADMIN — MELATONIN 3 MG: at 21:32

## 2023-03-04 RX ADMIN — BUPROPION HYDROCHLORIDE TABLETS 100 MG: 100 TABLET, FILM COATED ORAL at 17:04

## 2023-03-04 RX ADMIN — METOPROLOL SUCCINATE 25 MG: 25 TABLET, EXTENDED RELEASE ORAL at 09:27

## 2023-03-04 RX ADMIN — ACETAMINOPHEN 650 MG: 325 TABLET ORAL at 11:36

## 2023-03-04 RX ADMIN — POLYETHYLENE GLYCOL 3350 17 G: 17 POWDER, FOR SOLUTION ORAL at 19:30

## 2023-03-04 RX ADMIN — GABAPENTIN 200 MG: 100 CAPSULE ORAL at 09:27

## 2023-03-04 RX ADMIN — LAMOTRIGINE 150 MG: 100 TABLET ORAL at 21:32

## 2023-03-04 RX ADMIN — HYDROXYCHLOROQUINE SULFATE 400 MG: 200 TABLET ORAL at 09:30

## 2023-03-04 RX ADMIN — ACETAMINOPHEN 650 MG: 325 TABLET ORAL at 19:30

## 2023-03-04 RX ADMIN — APIXABAN 5 MG: 5 TABLET, FILM COATED ORAL at 17:04

## 2023-03-04 RX ADMIN — ALUMINUM HYDROXIDE, MAGNESIUM HYDROXIDE, AND DIMETHICONE 30 ML: 200; 20; 200 SUSPENSION ORAL at 19:30

## 2023-03-04 RX ADMIN — MORPHINE SULFATE 15 MG: 15 TABLET, FILM COATED, EXTENDED RELEASE ORAL at 09:27

## 2023-03-04 RX ADMIN — DULOXETINE HYDROCHLORIDE 60 MG: 60 CAPSULE, DELAYED RELEASE ORAL at 09:27

## 2023-03-04 RX ADMIN — METOPROLOL SUCCINATE 25 MG: 25 TABLET, EXTENDED RELEASE ORAL at 17:04

## 2023-03-04 RX ADMIN — ONDANSETRON 4 MG: 4 TABLET, ORALLY DISINTEGRATING ORAL at 11:36

## 2023-03-04 RX ADMIN — APIXABAN 5 MG: 5 TABLET, FILM COATED ORAL at 09:27

## 2023-03-04 RX ADMIN — DULOXETINE HYDROCHLORIDE 60 MG: 60 CAPSULE, DELAYED RELEASE ORAL at 21:32

## 2023-03-04 RX ADMIN — MORPHINE SULFATE 15 MG: 15 TABLET, FILM COATED, EXTENDED RELEASE ORAL at 17:04

## 2023-03-04 RX ADMIN — BUPROPION HYDROCHLORIDE TABLETS 100 MG: 100 TABLET, FILM COATED ORAL at 09:31

## 2023-03-04 NOTE — PROGRESS NOTES
03/04/23 0830   Pain Assessment   Pain Assessment Tool 0-10   Pain Score 6   Pain Location/Orientation Location: Back; Location: Leg   Pain Onset/Description Onset: Ongoing   Patient's Stated Pain Goal No pain   Hospital Pain Intervention(s) Repositioned; Rest  (SN notified)   Restrictions/Precautions   Precautions Aspiration;Bed/chair alarms;Cognitive; Fall Risk;Supervision on toilet/commode;THR;Pain  (L inattention)   Weight Bearing Restrictions No   ROM Restrictions No   Cognition   Overall Cognitive Status Impaired   Arousal/Participation Alert; Cooperative   Attention Difficulty attending to directions   Subjective   Subjective Patient upright in recliner upon PT arrival  Agreeable to PT sessions  Reports feeling nauseous   Sit to Stand   Type of Assistance Needed Physical assistance   Physical Assistance Level 51%-75%   Comment STS with R HHA, VC to ensure [facility] brakes are locked prior to initiating standing, cueing for placement on LUE on armrest not WC wheel   Sit to Stand CARE Score 2   Bed-Chair Transfer   Type of Assistance Needed Physical assistance   Physical Assistance Level 51%-75%   Comment SPT from recliner<>WC, practiced turning both to right and left  increased difficulty turning left secondary to L inattention, VC to scan enivorment when turning L, difficulty gripping arm rest with L hand   Chair/Bed-to-Chair Transfer CARE Score 2   Transfer Bed/Chair/Wheelchair   Limitations Noted In Balance; Coordination;Pain Management; Sequencing;UE Strength;LE Strength;Vision   Walk 10 Feet   Type of Assistance Needed Physical assistance   Physical Assistance Level 51%-75%   Comment ModA of 1 with R HHA   Walk 10 Feet CARE Score 2   Walk 50 Feet with Two Turns   Type of Assistance Needed Physical assistance   Physical Assistance Level 51%-75%   Comment ModA of 1 with R HHA   Walk 50 Feet with Two Turns CARE Score 2   Ambulation   Primary Mode of Locomotion Prior to Admission Walk   Gait Pattern Ataxic; Slow Amy;Decreased foot clearance;Shuffle   Limitations Noted In Balance; Coordination; Endurance; Heel Strike;Posture; Safety; Sequencing;Speed;Strength;Swing   Provided Assistance with: Balance;Direction;Weight Shift;Trunk Support   Walk Assist Level Moderate Assist   Findings Patient ambulated 2x 50 ft with R HHA and gait belt donned  Ambulates with forward flexed posture and shuffling gait  Fatigued quickly   Wheel 50 Feet with Two Turns   Type of Assistance Needed Physical assistance   Physical Assistance Level 51%-75%   Comment RUE and b/l LE, VC to scan to the left to avoid obstacles but difficulty doing so  Also provided VC to keep LUE away from [facility] wheel   Wheel 50 Feet with Two Turns CARE Score 2   Wheelchair mobility   Type of Wheelchair Used 1  Manual   Method Right upper extremity;Right lower extremity; Left lower extremity   Assistance Provided For Locking Brakes;Obstacles   Distance Level Surface (feet) 50 ft   Curb or Single Stair   Style negotiated Single stair   Type of Assistance Needed Physical assistance;Verbal cues   Physical Assistance Level 76% or more   1 Step (Curb) CARE Score 2   4 Steps   Type of Assistance Needed Physical assistance;Verbal cues   Physical Assistance Level 76% or more   Comment Patient ascended/descend 4 steps with bilateral handrails  ascend with reciprocal pattern VC to place foot fully onto step, desend with step to pattern leading with LLE  Verbal and tactile cues to  L railing with L hand   4 Steps CARE Score 2   12 Steps   Reason if not Attempted Safety concerns   12 Steps CARE Score 88   Toilet Transfer   Type of Assistance Needed Physical assistance   Physical Assistance Level 51%-75%   Comment ModA of 1 person with use of grab bar   Toilet Transfer CARE Score 2   Assessment   Treatment Assessment Patient participated in a 45 minute skilled physical therapy session focusing on transfers, [id number] [facility] mobility, ambulation, and stair navigation   She continues to present with L inattention and even with VC to scan the [place] has difficulty doing so resulting in increased assistance requried for transfers, ambulation, and manual wheelchair navigation  During the session, she reported feelin nauseous and reported pain  SN notified  Family/Caregiver Present no   Problem List Decreased strength;Decreased endurance; Impaired balance;Decreased mobility; Decreased coordination;Decreased cognition; Impaired judgement;Decreased safety awareness; Impaired vision;Decreased skin integrity;Pain   Barriers to Discharge Decreased caregiver support; Inaccessible home environment   PT Barriers   Physical Impairment Decreased strength;Decreased endurance; Impaired balance;Decreased mobility; Decreased coordination;Decreased cognition; Impaired judgement;Decreased safety awareness;Pain;Decreased skin integrity; Impaired vision   Plan   Treatment/Interventions ADL retraining;LE strengthening/ROM; Functional transfer training; Therapeutic exercise; Endurance training;Bed mobility;Gait training;Equipment eval/education;Patient/family training   Progress Progressing toward goals   PT Therapy Minutes   PT Time In 0830   PT Time Out 0915   PT Total Time (minutes) 45   PT Mode of treatment - Individual (minutes) 45   PT Mode of treatment - Concurrent (minutes) 0   PT Mode of treatment - Group (minutes) 0   PT Mode of treatment - Co-treat (minutes) 0   PT Mode of Treatment - Total time(minutes) 45 minutes   PT Cumulative Minutes 325   Therapy Time missed   Time missed?  No     Alex Close, PT, DPT

## 2023-03-04 NOTE — PROGRESS NOTES
"Rehabilitation Diagnosis: Impairment of mobility, safety, Activities of Daily Living (ADLs), and cognitive/communication skills due to Stroke: 01 1 Left body involvement (right brain)    ASSESSMENT: Stable    PLAN:  - Urinalysis with reflex positive for leukocytes large, an increase from trace 2/26; culture pending  - lethargy- improved with decrease in gabapentin yesterday  - impaired sleep - added Melatonin 3 mg hs    Rehabilitation  • Continue current rehabilitation plan of care to maximize function  • No funcitonal barriers identified    Medical issues  • No acute concerns  • Continue current medical plan of care  Appreciate IM consultants co-management  SUBJECTIVE: Patient seen face to face  No acute issues overnight  Patient awake, interactive, feeling less tired today  Reports impaired sleep from chronic back pain and right sided arm spasms  Progressing as expected in rehabilitation program  Denies chest pain, shortness of breath, fever, chills, nausea, abdominal pain  ROS:  A ten point review of systems was completed on 03/04/23 and pertinent positives are listed in subjective section  All other systems reviewed were negative  OBJECTIVE:   /73 (BP Location: Left arm)   Pulse 98   Temp 97 7 °F (36 5 °C) (Oral)   Resp 18   Ht 5' 3\" (1 6 m)   Wt 57 6 kg (126 lb 15 8 oz)   SpO2 96%   BMI 22 49 kg/m²     Physical Exam  Constitutional:       Appearance: Normal appearance  HENT:      Head: Normocephalic and atraumatic  Nose: Nose normal       Mouth/Throat:      Mouth: Mucous membranes are moist    Eyes:      Extraocular Movements: Extraocular movements intact  Pupils: Pupils are equal, round, and reactive to light  Cardiovascular:      Rate and Rhythm: Normal rate and regular rhythm  Pulses: Normal pulses  Heart sounds: Murmur heard  Pulmonary:      Effort: Pulmonary effort is normal       Breath sounds: Normal breath sounds     Abdominal:      General: Bowel " sounds are normal       Palpations: Abdomen is soft  Musculoskeletal:         General: Normal range of motion  Cervical back: Normal range of motion  Skin:     General: Skin is warm and dry  Capillary Refill: Capillary refill takes less than 2 seconds  Neurological:      Mental Status: She is alert  Motor: Weakness present     Psychiatric:         Mood and Affect: Mood normal          Judgment: Judgment normal           Personally reviewed on 03/04/23:   Lab Results   Component Value Date    WBC 9 83 03/02/2023    HGB 13 5 03/02/2023    HCT 41 3 03/02/2023    MCV 99 (H) 03/02/2023     03/02/2023     Lab Results   Component Value Date    SODIUM 138 03/02/2023    K 4 2 03/02/2023     03/02/2023    CO2 31 03/02/2023    BUN 14 03/02/2023    CREATININE 0 91 03/02/2023    GLUC 107 03/02/2023    CALCIUM 10 6 (H) 03/02/2023     Lab Results   Component Value Date    INR 0 95 02/22/2023    INR 0 90 02/12/2023    INR 1 12 04/22/2022    PROTIME 12 8 02/22/2023    PROTIME 12 1 02/12/2023    PROTIME 14 0 04/22/2022       Current Facility-Administered Medications:   •  acetaminophen (TYLENOL) tablet 650 mg, 650 mg, Oral, Q6H PRN, Peg Likens, DO, 650 mg at 03/01/23 1455  •  albuterol (PROVENTIL HFA,VENTOLIN HFA) inhaler 2 puff, 2 puff, Inhalation, Q6H PRN, Peg Likens, DO  •  aluminum-magnesium hydroxide-simethicone (MYLANTA) oral suspension 30 mL, 30 mL, Oral, Q4H PRN, Peg Likens, DO, 30 mL at 03/02/23 2145  •  apixaban (ELIQUIS) tablet 5 mg, 5 mg, Oral, BID, Peg Likens, DO, 5 mg at 03/04/23 3952  •  atorvastatin (LIPITOR) tablet 40 mg, 40 mg, Oral, QPM, Peg Likens, DO, 40 mg at 03/03/23 1804  •  buPROPion [facility]) tablet 100 mg, 100 mg, Oral, BID, Peg Likens, DO, 100 mg at 03/04/23 4207  •  DULoxetine (CYMBALTA) delayed release capsule 60 mg, 60 mg, Oral, BID, Peg Likens, DO, 60 mg at 03/04/23 0603  •  gabapentin (NEURONTIN) capsule 200 mg, 200 mg, Oral, BID, Peg Likens, , 200 mg at 03/04/23 4322  •  glycerin-hypromellose- (ARTIFICIAL TEARS) ophthalmic solution 1 drop, 1 drop, Both Eyes, Q4H PRN, Altaf Guardado DO  •  hydroxychloroquine (PLAQUENIL) tablet 200 mg, 200 mg, Oral, Once per day on Mon Tue Wed Thu Fri, Carloz Garcia, DO, 200 mg at 03/03/23 1053  •  hydroxychloroquine (PLAQUENIL) tablet 400 mg, 400 mg, Oral, Once per day on Sun Sat, Carloz DELEON Parnell, DO, 400 mg at 03/04/23 0930  •  lamoTRIgine (LaMICtal) tablet 150 mg, 150 mg, Oral, HS, Altaf Guardado DO, 150 mg at 03/03/23 2340  •  lidocaine (LIDODERM) 5 % patch 1 patch, 1 patch, Topical, Daily, Aldair Camara MD, 1 patch at 03/04/23 7028  •  menthol-methyl salicylate (BENGAY) 01-25 % cream, , Apply externally, 4x Daily PRN, Aldair Camara MD, Given at 03/02/23 1549  •  metoprolol succinate (TOPROL-XL) 24 hr tablet 25 mg, 25 mg, Oral, BID, LAURA Jackson, 25 mg at 03/04/23 2399  •  morphine (MS CONTIN) ER tablet 15 mg, 15 mg, Oral, BID, Altaf Guardado DO, 15 mg at 03/04/23 3266  •  ondansetron (ZOFRAN-ODT) dispersible tablet 4 mg, 4 mg, Oral, Q6H PRN, Altaf Guardado DO, 4 mg at 03/03/23 1805  •  polyethylene glycol (MIRALAX) packet 17 g, 17 g, Oral, Daily, Altaf Guardado DO  •  polyethylene glycol (MIRALAX) packet 17 g, 17 g, Oral, Daily PRN, Altaf uGardado DO  •  senna-docusate sodium (SENOKOT S) 8 6-50 mg per tablet 1 tablet, 1 tablet, Oral, BID, Altaf Guardado DO, 1 tablet at 03/04/23 2958    Past Medical History:   Diagnosis Date   • Anxiety    • Asthma    • Bipolar depression ([address] )    • Chronic narcotic dependence (HCC)    • Chronic pain    • Depression    • Enterovirus heart infection    • SHAR (obstructive sleep apnea)    • Osteoarthritis    • Peripheral neuropathy    • Plantar fasciitis of right foot    • Senile osteoporosis    • Seronegative arthropathy of multiple sites [facility])    • Undifferentiated connective tissue disease [facility])        Patient Active Problem List    Diagnosis Date Noted   • Obstructive sleep apnea 02/28/2023   • History of stroke 02/28/2023   • Bacteremia 02/24/2023   • Acute CVA (cerebrovascular accident) ([facility] ) 02/23/2023   • Hypercalcemia 02/22/2023   • Discoloration of skin of foot 02/22/2023   • Abdominal pain 02/22/2023   • HFrEF (heart failure with reduced ejection fraction) ([facility] ) 02/13/2023   • Stroke ([place] [id number] ) 02/12/2023   • HTN (hypertension) 02/12/2023   • Cardiomyopathy ([facility] ) 02/07/2023   • Vitamin D insufficiency 02/07/2023   • LAILA (acute kidney injury) ([place] [address] ) 04/23/2022   • Polypharmacy 04/23/2022   • 'light-for-dates' infant with signs of fetal malnutrition 04/22/2022   • Bipolar depression ([facility] ) 03/04/2022   • Rheumatoid arthritis ([place] [address] ) 03/04/2022   • Closed fracture of multiple ribs of right side 03/02/2022   • Traumatic pneumothorax 03/02/2022   • Closed fracture of transverse process of lumbar vertebra ([place] [address] ) 03/02/2022   • Liver contusion 03/02/2022   • Fall 03/02/2022   • Acute pain due to trauma 03/02/2022   • Right shoulder pain 03/02/2022   • Severe protein-calorie malnutrition ([facility] ) 03/02/2022   • Undifferentiated connective tissue disease ([place] [address] ) 12/29/2021   • Primary generalized (osteo)arthritis 12/29/2021   • Peripheral neuropathy 12/29/2021   • Osteoporosis 12/29/2021   • Hx of compression fracture of spine 12/29/2021   • Lumbar spondylosis 12/29/2021   • Cervical spondylosis 12/29/2021   • Anemia 06/26/2021   • Chronic pain syndrome    • Asthma    • Ambulatory dysfunction    • Seronegative arthropathy of multiple sites ([place] [address] )    • Gastroesophageal reflux disease without esophagitis    • Mixed stress and urge urinary incontinence    • CHF (congestive heart failure) ([place] [address] )         "[employer] [address] [place] [address]

## 2023-03-04 NOTE — PROGRESS NOTES
"OT Treatment Note       03/04/23 1030   Pain Assessment   Pain Assessment Tool 0-10   Pain Score 5   Pain Location/Orientation Orientation: Lower; Location: Back;Orientation: Left; Location: Arm   Pain Onset/Description Onset: Ongoing   Hospital Pain Intervention(s) Repositioned; Rest   Restrictions/Precautions   Precautions Aspiration;Bed/chair alarms;Cognitive; Fall Risk;Supervision on toilet/commode;Pain  (L inattention)   Weight Bearing Restrictions No   ROM Restrictions No   Lifestyle   Autonomy \"Sorry I was sleeping in your face,\" after becoming alert and completing transfer   Eating   Comment SUP to bring cup/can to mouth to drink   Oral Hygiene   Type of Assistance Needed Supervision;Verbal cues   Physical Assistance Level No physical assistance   Comment Seated at sink, pt opening toothpaste and applying to toothbrush with vc's for compensatory techniques; brushing teeth/rinsing mouth with SUP   Oral Hygiene CARE Score 4   Grooming   Findings washing hands seated at sink with SUP and vc's to attend to L side   Sit to Stand   Type of Assistance Needed Physical assistance   Physical Assistance Level 26%-50%   Comment min/modA HHA; vc's for hand placement LUE   Sit to Stand CARE Score 3   Bed-Chair Transfer   Type of Assistance Needed Physical assistance   Physical Assistance Level 51%-75%   Comment multiple SPT throughout session to both sides; increased assistance and difficulty turning to L, requiring vc's and tactile cues to attend to LUE and LLE to  foot to completely turn to sitting surface  At best Marika when turning to R side   Chair/Bed-to-Chair Transfer CARE Score 2   Toileting Hygiene   Type of Assistance Needed Physical assistance   Physical Assistance Level 51%-75%   Comment assistance to push pants down on L side, assistance in fully hiking pants up both sides - pt neglecting to fully pull up underwear prior to pants, requiring vc's and tactile cues   Able to complete bladder hygiene with SUP " seated on BSC over toilet   [address] Score 2   Toilet Transfer   Type of Assistance Needed Physical assistance   Physical Assistance Level 51%-75%   Comment SPT WC to BSC over toilet, vc's for technique and safety   Toilet Transfer CARE Score 2   ROM - Left Upper Extremities    L Shoulder AAROM;AROM   L Position Seated   L Weight/Reps/Sets 5x3   LUE ROM Comment punches LUE and cross diagonal reaching into sh abd and ER with visual target   Cognition   Overall Cognitive Status Impaired   Arousal/Participation Lethargic; Alert; Cooperative  (initialyl lethargic, requiring extended time to remain alert for session)   Attention Difficulty attending to directions   Orientation Level Oriented to person;Oriented to place   Memory Decreased recall of recent events   Following Commands Follows one step commands with increased time or repetition   Activity Tolerance   Activity Tolerance Patient limited by fatigue   Other Comments   Assessment HR ranging 75-104bpm, O2 98% during session   Assessment   Treatment Assessment Pt seen for 60 min OT session focusing on functional transfers, toileting and grooming ADLs and LUE NMR for increased functional use  Pt initially lethargic at start of session, requiring extended time to remain alert to participate (RN made aware), but then able to stay awake for remainder of session without vc's  Pt reporting nausea with functional transfers, PCA to inform RN pt requesting zofran  OT to continue POC to focus on LUE NMR, L inattention, standing tolerance/balance, repetitive functional transfer training and safety training to maximize functional independence  Prognosis Good   Problem List Decreased strength;Decreased endurance;Decreased mobility; Impaired balance;Decreased coordination;Decreased cognition; Impaired judgement;Decreased safety awareness; Impaired vision;Pain   Barriers to Discharge Inaccessible home environment;Decreased caregiver support   Plan Treatment/Interventions ADL retraining;Functional transfer training; Therapeutic exercise; Endurance training;Cognitive reorientation;Patient/family training;Equipment eval/education; Bed mobility;Gait training; Compensatory technique education;Spoke to nursing   Progress Progressing toward goals   Recommendation   OT Discharge Recommendation   (pending progress)   OT Therapy Minutes   OT Time In 1030   OT Time Out 1130   OT Total Time (minutes) 60   OT Mode of treatment - Individual (minutes) 60   OT Mode of treatment - Concurrent (minutes) 0   OT Mode of treatment - Group (minutes) 0   OT Mode of treatment - Co-treat (minutes) 0   OT Mode of Treatment - Total time(minutes) 60 minutes   OT Cumulative Minutes 350   Therapy Time missed   Time missed?  No

## 2023-03-04 NOTE — PROGRESS NOTES
"OT Treatment Note       03/04/23 3646   Pain Assessment   Pain Assessment Tool 0-10   Pain Score No Pain   Restrictions/Precautions   Precautions Aspiration;Bed/chair alarms;Cognitive; Fall Risk;Supervision on toilet/commode;Visual deficit; Pain  (L inattention)   Weight Bearing Restrictions No   ROM Restrictions No   Lifestyle   Autonomy \"I'm sick of having a stroke  \"   Grooming   Findings washing hands seated at sink with SUP and vc's for technique and to attend to L side   Sit to Stand   Type of Assistance Needed Physical assistance   Physical Assistance Level 25% or less   Comment Marika, vc's for hand placement and safe technique   Sit to Stand CARE Score 3   Bed-Chair Transfer   Type of Assistance Needed Physical assistance   Physical Assistance Level 26%-50%   Comment improvement noted throughout session; vc's to completely turn to sitting surface with cues to bring R foot towards WC wheel with noted improvement  Fluctuating with turning to L side modA->Marika and vc's for safe technique to fully turn to sitting surface and for LUE placement   Chair/Bed-to-Chair Transfer CARE Score 3   Toileting Hygiene   Type of Assistance Needed Physical assistance   Physical Assistance Level 26%-50%   Comment assistance to push/pull up pants L side; vc's and tactile cues to hike underwear up prior to pants; able to complete bladder hygiene seated with SUP   Toileting Hygiene CARE Score 3   Toilet Transfer   Type of Assistance Needed Physical assistance   Physical Assistance Level 26%-50%   Comment SPT WC to BSC over toilet with vc's for safe technique, LUE placement, and to fully turn to sitting surface   Toilet Transfer CARE Score 3   Neuromuscular Education   Comments Pt completing functional reaching seated in cross diagonal patterns LUE to retrieve cones and place on L side on tabletop, min vc's to locate table on L  Pt often reaching for table prior to having cone in hand, also over/undershooting   Also attempting to work on " "tripod grasp picking up cylindrical pegs and place into appropriately colored cups on tabletop, requiring some [place] and vc's to maintain L  on objects, again with pt over/undershooting items  Finally for standing balance/tolerance, weigthshifting, and bimanual integration pt working on folding clothing standing at table  Pt able to maintain stance at tabletop throughout task with CGA with one instance of posterior LOB requiring Marika to correct  Pt requiring mod/max vc's for incorporation of LUE into task, tactile cues and some [place] to grasp clothing with L hand and maintain grasp throughout; able to place clothing into basket with BUE with CGA  Pt brandy to tolerate ~10min standing  Cognition   Overall Cognitive Status Impaired   Arousal/Participation Alert; Cooperative   Attention Attends with cues to redirect   Orientation Level Oriented to person;Oriented to place;Oriented to time   Memory Decreased recall of precautions   Following Commands Follows one step commands with increased time or repetition   Activity Tolerance   Activity Tolerance Patient tolerated treatment well   Assessment   Treatment Assessment Pt seen for 60 min OT session focusing on functional transfers, toileting and grooming tasks, LUE NMR and standing balance/tolerance  Pt tolerating session well with increased alertness compared to morning session  Pt demo'ing increased (I) with functional transfers this session  Pt verbalizing frustration re: current level of function and being \"sick of having a stroke;\" support, edu, and encouragement provided  OT to continue POC to focus on LUE NMR, L inattention, standing balance and tolerance, and repetitive functional transfer training to maximize safety and (I) with BADLs and functional transfers  Prognosis Good   Problem List Decreased strength;Decreased endurance; Impaired balance;Decreased mobility; Decreased coordination;Decreased cognition; Impaired judgement;Decreased safety awareness; Impaired " vision;Pain; Impaired sensation   Barriers to Discharge Decreased caregiver support; Inaccessible home environment   Plan   Treatment/Interventions ADL retraining;Functional transfer training; Therapeutic exercise; Endurance training;Cognitive reorientation;Patient/family training;Equipment eval/education; Bed mobility;Gait training; Compensatory technique education;Spoke to nursing   Progress Progressing toward goals   Recommendation   OT Discharge Recommendation   (pending progress)   OT Therapy Minutes   OT Time In 1235   OT Time Out 1335   OT Total Time (minutes) 60   OT Mode of treatment - Individual (minutes) 60   OT Mode of treatment - Concurrent (minutes) 0   OT Mode of treatment - Group (minutes) 0   OT Mode of treatment - Co-treat (minutes) 0   OT Mode of Treatment - Total time(minutes) 60 minutes   OT Cumulative Minutes 410   Therapy Time missed   Time missed?  No

## 2023-03-04 NOTE — PROGRESS NOTES
Internal Medicine Progress Note  Patient: Shaniqua Gracia  Age/sex: 75 y o  female  Medical Record #: [id number]      ASSESSMENT/PLAN: (Interval History)  Shaniqua Gracia is seen and examined and management for following issues:    Recent right MCA infarct W/petechial hemorrhage  • S/P thrombectomy by Dr Ning Ybarra 2/12  • Continue Eliquis 5 mg twice daily/statin  • Follow-up with neurosurgery/neurology as scheduled     NICM  • EF has been as low as 10 to 15% in the past  • In 2022 was 65%  • Now down to 30% = pt declined LifeVest  • Cardiology felt this was secondary to her recent CVA as well as possible medication noncompliance  • They recommend continuing beta-blocker and adding ARB when BP allows  • Will need outpatient ischemia work-up  • Followed by Dr Codie Espinosa at [facility] however would like to follow with [facility] on dc  • On Toprol 25mg BID = held last evening for BP 99/62 but did get this AM     Seronegative rheumatoid arthritis  • A/T rheumatology takes Plaquenil 200 mg Monday through Friday 400 mg on Saturday and Sunday  • Continue same  • Follow-up on discharge      Chronic pain  • Due to compression fractures of her thoracic spine/chronic rib fractures  • Takes MS Contin 15 mg every 12 hours  • Has oxycodone 5 mg immediate release for breakthrough pain at home  • According to the patient she takes only 1 dose of breakthrough oxycodone per day  • Pain management per PMR     Bipolar disorder  • Continue current medication regimen  • Follow-up with psychiatry on discharge  • Recommend Neuropsych consult     SHAR  • Refusing CPAP here  • OK use oxygen overnight but had none last night     Tachycardia/low O2 on pulse ox  • Some irregularity on exam in past but today RRR  • EKG with PVCs  • O2 sats improved when hands were warmed up  O2 sats not likely to be accurate due to circulation issues      Dysuria/Urinary frequency/incontinence  • PMR sent UA/cx 3/3/23    • Offered no complaints today        DC planning: TBD    The above assessment and plan was reviewed and updated as determined by my evaluation of the patient on 3/4/2023      Labs:   Results from last 7 days   Lab Units 03/02/23  0839 02/27/23  0938   WBC Thousand/uL 9 83 7 03   HEMOGLOBIN g/dL 13 5 13 9   HEMATOCRIT % 41 3 42 2   PLATELETS Thousands/uL 245 261     Results from last 7 days   Lab Units 03/02/23  0839 02/28/23  0519   SODIUM mmol/L 138 142   POTASSIUM mmol/L 4 2 4 2   CHLORIDE mmol/L 108 111*   CO2 mmol/L 31 26   BUN mg/dL 14 15   CREATININE mg/dL 0 91 0 83   CALCIUM mg/dL 10 6* 10 6*                   Review of Scheduled Meds:  Current Facility-Administered Medications   Medication Dose Route Frequency Provider Last Rate   • acetaminophen  650 mg Oral Q6H PRN Polina Carton, DO     • albuterol  2 puff Inhalation Q6H PRN Polina Carton, DO     • aluminum-magnesium hydroxide-simethicone  30 mL Oral Q4H PRN Polina Carton, DO     • apixaban  5 mg Oral BID Polina Carton, DO     • atorvastatin  40 mg Oral QPM Polina Carton, DO     • buPROPion  100 mg Oral BID Polina Carton, DO     • DULoxetine  60 mg Oral BID Polina Carton, DO     • gabapentin  200 mg Oral BID Polina Carton, DO     • glycerin-hypromellose-  1 drop Both Eyes Q4H PRN Polina Carton, DO     • hydroxychloroquine  200 mg Oral Once per day on Mon Tue Wed Thu Fri Polina Carton, DO     • hydroxychloroquine  400 mg Oral Once per day on Sun Sat Polina Carton, DO     • lamoTRIgine  150 mg Oral HS Polina Carton, DO     • lidocaine  1 patch Topical Daily Guera Hubbard MD     • melatonin  3 mg Oral HS LAURA Mariscal     • menthol-methyl salicylate   Apply externally 4x Daily PRN Guera Hubbard MD     • metoprolol succinate  25 mg Oral BID LAURA Carrasquillo     • morphine  15 mg Oral BID Polina Carton, DO     • ondansetron  4 mg Oral Q6H PRN Polina Carton, DO     • polyethylene glycol  17 g Oral Daily Polina Carton, DO     • polyethylene glycol  17 g Oral Daily PRN Joeseph Cabot, DO     • senna-docusate sodium  1 tablet Oral BID Joeseph Cabot, DO         Subjective/ HPI: Patient seen and examined  Patients overnight issues or events were reviewed with nursing or staff during rounds or morning huddle session  New or overnight issues include the following:     No new or overnight issues  Offers no complaints    ROS:   A 10 point ROS was performed; negative except as noted above  Imaging:     No orders to display       *Labs /Radiology studies reviewed  *Medications reviewed and reconciled as needed  *Please refer to order section for additional ordered labs studies  *Case discussed with primary attending during morning huddle case rounds    Physical Examination:  Vitals:   Vitals:    03/03/23 1424 03/03/23 1804 03/03/23 2117 03/04/23 0635   BP: 100/62 99/62 118/60 130/73   BP Location: Left arm Right arm Left arm Left arm   Pulse: 90 100 94 98   Resp: 17  20 18   Temp: (!) 97 4 °F (36 3 °C)  97 9 °F (36 6 °C) 97 7 °F (36 5 °C)   TempSrc: Axillary  Oral Oral   SpO2: 95%  100% 96%   Weight:       Height:           General Appearance: no distress, conversive  HEENT: PERRLA, conjuctiva normal; oropharynx clear; mucous membranes moist   Neck:  Supple, normal ROM  Lungs: CTA, normal respiratory effort, no retractions, expiratory effort normal  CV: regular rate and rhythm; no rubs/murmurs/gallops, PMI normal   ABD: soft; ND/NT; +BS  EXT: no edema  Skin: normal turgor, normal texture, no rashes  Psych: affect normal, mood normal  Neuro: AAO; +dysarthria; left sided weakness      The above physical exam was reviewed and updated as determined by my evaluation of the patient on 3/4/2023  Invasive Devices     Drain  Duration           External Urinary Catheter 3 days                   VTE Pharmacologic Prophylaxis: Eliquis  Code Status: Level 1 - Full Code  Current Length of Stay: 4 day(s)      Total time spent:  30 minutes with more than 50% spent counseling/coordinating care  Counseling includes discussion with patient re: progress  and discussion with patient of his/her current medical state/information  Coordination of patient's care was performed in conjunction with primary service  Time invested included review of patient's labs, vitals, and management of their comorbidities with continued monitoring  In addition, this patient was discussed with medical team including physician and advanced extenders  The care of the patient was extensively discussed and appropriate treatment plan was formulated unique for this patient  Medical decision making for the day was made by supervising physician unless otherwise noted in their attestation statement  ** Please Note:  voice to text software may have been used in the creation of this document   Although proof errors in transcription or interpretation are a potential of such software**

## 2023-03-04 NOTE — PLAN OF CARE
Problem: PAIN - ADULT  Goal: Verbalizes/displays adequate comfort level or baseline comfort level  Description: Interventions:  - Encourage patient to monitor pain and request assistance  - Assess pain using appropriate pain scale  - Administer analgesics based on type and severity of pain and evaluate response  - Implement non-pharmacological measures as appropriate and evaluate response  - Consider cultural and social influences on pain and pain management  - Notify physician/advanced practitioner if interventions unsuccessful or patient reports new pain  Outcome: Progressing     Problem: INFECTION - ADULT  Goal: Absence or prevention of progression during hospitalization  Description: INTERVENTIONS:  - Assess and monitor for signs and symptoms of infection  - Monitor lab/diagnostic results  - Monitor all insertion sites, i e  indwelling lines, tubes, and drains  - Monitor endotracheal if appropriate and nasal secretions for changes in amount and color  - [place] appropriate cooling/warming therapies per order  - Administer medications as ordered  - Instruct and encourage patient and family to use good hand hygiene technique  - Identify and instruct in appropriate isolation precautions for identified infection/condition  Outcome: Progressing  Goal: Absence of fever/infection during neutropenic period  Description: INTERVENTIONS:  - Monitor WBC    Outcome: Progressing     Problem: SAFETY ADULT  Goal: Patient will remain free of falls  Description: INTERVENTIONS:  - Educate patient/family on patient safety including physical limitations  - Instruct patient to call for assistance with activity   - Consult OT/PT to assist with strengthening/mobility   - Keep Call bell within reach  - Keep bed low and locked with side rails adjusted as appropriate  - Keep care items and personal belongings within reach  - Initiate and maintain comfort rounds  - Make Fall Risk Sign visible to staff  - Offer Toileting every Hours, in advance of need  - Initiate/Maintain alarm  - Obtain necessary fall risk management equipment:   - Apply yellow socks and bracelet for high fall risk patients  - Consider moving patient to room near nurses station  Outcome: Progressing  Goal: Maintain or return to baseline ADL function  Description: INTERVENTIONS:  -  Assess patient's ability to carry out ADLs; assess patient's baseline for ADL function and identify physical deficits which impact ability to perform ADLs (bathing, care of mouth/teeth, toileting, grooming, dressing, etc )  - Assess/evaluate cause of self-care deficits   - Assess range of motion  - Assess patient's mobility; develop plan if impaired  - Assess patient's need for assistive devices and provide as appropriate  - Encourage maximum independence but intervene and supervise when necessary  - Involve family in performance of ADLs  - Assess for home care needs following discharge   - Consider OT consult to assist with ADL evaluation and planning for discharge  - Provide patient education as appropriate  Outcome: Progressing  Goal: Maintains/Returns to pre admission functional level  Description: INTERVENTIONS:  - Perform BMAT or MOVE assessment daily    - Set and communicate daily mobility goal to care team and patient/family/caregiver  - Collaborate with rehabilitation services on mobility goals if consulted  - Perform Range of Motion  times a day  - Reposition patient every  hours    - Dangle patient  times a day  - Stand patient  times a day  - Ambulate patient  times a day  - Out of bed to chair  times a day   - Out of bed for meals  times a day  - Out of bed for toileting  - Record patient progress and toleration of activity level   Outcome: Progressing     Problem: DISCHARGE PLANNING  Goal: Discharge to home or other facility with appropriate resources  Description: INTERVENTIONS:  - Identify barriers to discharge w/patient and caregiver  - Arrange for needed discharge resources and transportation as appropriate  - Identify discharge learning needs (meds, wound care, etc )  - Arrange for interpretive services to assist at discharge as needed  - Refer to Case Management Department for coordinating discharge planning if the patient needs post-hospital services based on physician/advanced practitioner order or complex needs related to functional status, cognitive ability, or social support system  Outcome: Progressing     Problem: Nutrition/Hydration-ADULT  Goal: Nutrient/Hydration intake appropriate for improving, restoring or maintaining nutritional needs  Description: Monitor and assess patient's nutrition/hydration status for malnutrition  Collaborate with interdisciplinary team and initiate plan and interventions as ordered  Monitor patient's weight and dietary intake as ordered or per policy  Utilize nutrition screening tool and intervene as necessary  Determine patient's food preferences and provide high-protein, high-caloric foods as appropriate       INTERVENTIONS:  - Monitor oral intake, urinary output, labs, and treatment plans  - Assess nutrition and hydration status and recommend course of action  - Evaluate amount of meals eaten  - Assist patient with eating if necessary   - Allow adequate time for meals  - Recommend/ encourage appropriate diets, oral nutritional supplements, and vitamin/mineral supplements  - Order, calculate, and assess calorie counts as needed  - Recommend, monitor, and adjust tube feedings and TPN/PPN based on assessed needs  - Assess need for intravenous fluids  - Provide specific nutrition/hydration education as appropriate  - Include patient/family/caregiver in decisions related to nutrition  Outcome: Progressing     Problem: Prexisting or High Potential for Compromised Skin Integrity  Goal: Skin integrity is maintained or improved  Description: INTERVENTIONS:  - Identify patients at risk for skin breakdown  - Assess and monitor skin integrity  - Assess and monitor nutrition and hydration status  - Monitor labs   - Assess for incontinence   - Turn and reposition patient  - Assist with mobility/ambulation  - Relieve pressure over bony prominences  - Avoid friction and shearing  - Provide appropriate hygiene as needed including keeping skin clean and dry  - Evaluate need for skin moisturizer/barrier cream  - Collaborate with interdisciplinary team   - Patient/family teaching  - Consider wound care consult   Outcome: Progressing     Problem: MOBILITY - ADULT  Goal: Maintain or return to baseline ADL function  Description: INTERVENTIONS:  -  Assess patient's ability to carry out ADLs; assess patient's baseline for ADL function and identify physical deficits which impact ability to perform ADLs (bathing, care of mouth/teeth, toileting, grooming, dressing, etc )  - Assess/evaluate cause of self-care deficits   - Assess range of motion  - Assess patient's mobility; develop plan if impaired  - Assess patient's need for assistive devices and provide as appropriate  - Encourage maximum independence but intervene and supervise when necessary  - Involve family in performance of ADLs  - Assess for home care needs following discharge   - Consider OT consult to assist with ADL evaluation and planning for discharge  - Provide patient education as appropriate  Outcome: Progressing  Goal: Maintains/Returns to pre admission functional level  Description: INTERVENTIONS:  - Perform BMAT or MOVE assessment daily    - Set and communicate daily mobility goal to care team and patient/family/caregiver  - Collaborate with rehabilitation services on mobility goals if consulted  - Perform Range of Motion 3times a day  - Reposition patient every 2 hours    - Dangle patient 3 times a day  - Stand patient 3 times a day  - Ambulate patient 3 times a day  - Out of bed to chair 3 times a day   - Out of bed for meals 3 times a day  - Out of bed for toileting  - Record patient progress and toleration of activity level   Outcome: Progressing     Problem: Nutrition/Hydration-ADULT  Goal: Nutrient/Hydration intake appropriate for improving, restoring or maintaining nutritional needs  Description: Monitor and assess patient's nutrition/hydration status for malnutrition  Collaborate with interdisciplinary team and initiate plan and interventions as ordered  Monitor patient's weight and dietary intake as ordered or per policy  Utilize nutrition screening tool and intervene as necessary  Determine patient's food preferences and provide high-protein, high-caloric foods as appropriate       INTERVENTIONS:  - Monitor oral intake, urinary output, labs, and treatment plans  - Assess nutrition and hydration status and recommend course of action  - Evaluate amount of meals eaten  - Assist patient with eating if necessary   - Allow adequate time for meals  - Recommend/ encourage appropriate diets, oral nutritional supplements, and vitamin/mineral supplements  - Order, calculate, and assess calorie counts as needed  - Recommend, monitor, and adjust tube feedings and TPN/PPN based on assessed needs  - Assess need for intravenous fluids  - Provide specific nutrition/hydration education as appropriate  - Include patient/family/caregiver in decisions related to nutrition  Outcome: Progressing

## 2023-03-04 NOTE — PROGRESS NOTES
"   03/04/23 1130   Pain Assessment   Pain Assessment Tool 0-10   Pain Score No Pain   Restrictions/Precautions   Precautions Aspiration;Bed/chair alarms;Cognitive; Fall Risk;Supervision on toilet/commode;Visual deficit   Comprehension   Comprehension (FIM) 3 - Understands basic info/conversation 50-74% of time   Expression   Expression (FIM) 3 - Needs to repeat parts of sentences   Social Interaction   Social Interaction (FIM) 5 - Interacts appropriately with others 90% of time   Problem Solving   Problem solving (FIM) 3 - Solves basic problmes 50-74% of time   Memory   Memory (FIM) 3 - Recognizes, recalls/performs 50-74%   Speech/Language/Cognition Assessmetn   Treatment Assessment Pt continued to complete sections of the CLQT+ as a continuation of initial evaluation during today's cognitive linguistic tx session  Pt completed story retell section w/ score of 7 considered WNL for this task in comparison to age matched peers, generative naming score of 4 considered below the criterion cut off score for this task and a design memory score of 4 also considered below the criterion cut off score for this task  Pt to complete symbol trails, mazes and design generation tasks in future ST tx sessions as able/ appropriate in order to generate pt's cognitive domain scores for attention, memory, executive functions, language and visuospatial skills  Pt verbose w/ dysarthric speech corona by low volume, distortions and vague speech req repetitions and verbal cues to be understood at times  Pt demo intact remote memory and awareness of current state, still w/ dec visual acuity/ visiospatial skills, w/ reading glasses on pt swiping the top of phone screen attempting to answer phone call from son req physical assist from student SLP to answer phone call   Pt also exhibiting vague speech, dec mental flexibility and possible word retrieval deficits, when explaining to son which hospital she was at \" the big [facility] in [place]\" " repeatedly unable to rephrase her response in response to son's confusion  Pt son Ning Ybarra) and grandson present the last 10 minutes of session, pt w/ impaired divided attention while trying to converse w/ grandson/ son and continue w/ tx tasks, req verbal repetition and re-direction to task  At this time pt continues to benefit from skilled ST services targeting cognitive linguistic skills in order to minimize caregiver burden upon d/c  Eating   Type of Assistance Needed Set-up / Libby Blackwell; Verbal cues; Supervision   Physical Assistance Level No physical assistance   Eating CARE Score 4   Swallow Assessment   Swallow Treatment Assessment Daily Dysphagia Tx Note     Patient Name: Shaniqua Gracia    SUPNQ'H Date: 3/4/2023      Current Risks for Dysphagia & Aspiration: Weak voicing; weak cough; dysarthria; general debilitation; new neuro event; brain injury; cognitive deficit; hx neurologic dx; positioning issues     Current Symptoms/Concerns: Cough; throat clear; with liquids; during meals; with pills; difficulty chewing; decreased oral intake; hx of dysphagia/aspiration     Current diet: puree/level 1 diet and thin liquids      Premorbid diet::upon d/c from prior admission, pt appeared to be on dysphagia level 2/thin liquids upon prior admission for stroke initially; unknown prior to admission, pt rx being on softer foods as well as thickened liquids in the past      Follows commands: +     Cognitive Status: Awake, alert and cooperative     Positioning: upright in recliner    Items administered:Consistencies Administered: thin liquids, soft solids and mixed consistency   Materials administered included mac and cheese, dysphagia level 2 chicken, diced pears, coffee and ginger ale    Total amount of meal consumed:   25% of meal   Approximately 120 cc of thin       Oral stage:moderate  Lip closure: WFL   Anterior spillage: x 2 w/ cup sips of thin d/t incoordination of R hand   Mastication: mod-severe prolonged uneffective to fully breakdown soft solids (pears and mac n cheese)   Bolus formation: reduced   Bolus control: suspect reduced   Transfer: delayed   Oral residue:unintentional oral holding x 2 w/ mac n cheese and x1 w/ diced pears, talking while full pieces remain   Pocketing: none noted          Pharyngeal stage:moderate  Swallow promptness: delayed w/ solids, fairly prompt w/ thin  Hyolaryngeal elevation:  Weak, decreased   Wet voice: none noted   Throat clear: x 2 w/ mac n cheese, x1 w/ diced pears   Cough: dry cough x 4 w/ straw sips of thin, cough x2 w/ diced pears resulting in coughing up whole piece of pear   Secondary swallows: multiple swallows w/ solids   Audible swallows: x 5 w/ straw sips of thin and x 3 w/ soft solids        Esophageal stage:belch x2 after completion of meal         Summary:     Pt presenting with moderate oral and moderate pharyngeal dysphagia today  Symptoms or concerns included decreased bolus acceptance, decreased bolus propulsion, decreased mastication, decreased bolus formation, suspected decreased control of soft solids and thin   and unintentional oral holding w/ soft solids  suspected pharyngeal swallow delay, suspected decreased hyolaryngeal elevation upon palpation, suspected pharyngeal residue, multiple swallows, audible swallows and throat clearing w/ solids, coughing w/ sips of thin and soft solids  Pt seen for f/u dysphagia tx w/ trial dysphagia level 2 lunch tray to assess pt's tolerance of adv textures and straw sips of thin  Pt required tray set up then able to self feed primarily w/ R hand  Utensil and cup manipulation was impaired d/t pt weakness, incoordination and tremors, resulting in spilt coffee and dropping utensil during meal  Pt w/ dec appetite only consuming approximately 25% of meal d/t pt  c/o of feeling nauseous  Pt w/ appropriate lip seal for containment of solids and cup/ straw sips of thin w/ anterior spill x 2 d/t incoordination of R hand  "Mastication w/ soft solids mod-severely prolonged and ineffective to fully breakdown soft solids, observed unintentional holding of whole pieces of mac n cheese and whole pieces of diced pear in oral cavity post swallow, pt cleared w/ verbal cue  A to P transfer and swallow initiation delayed w/ solids  Hyolaryngeal excursion  to be weak and decreased  Pt w/ throat clearing after bites of soft solids and dry cough x4 w/ single straw sips of thin and diced pears producing whole piece of pear  Pt aware of this \" I could feel it in there  \" Multiple swallows present w/ solids and audible swallows w/ straw sips of thin and x3 w/ soft solids  Pt provided education on taking smaller bites and sips, alternating bites/ sips, being aware of remenants in oral cavity post swallow and rationale for current diet recommendations  Pt stated understanding and agreeable at this time  Pt continues to warrant f/u dysphagia tx to assess tolerance of current diet and appropriateness for diet upgrade and trial straw sips of thin as appropriate  Recommendations:  Diet: puree/level 1 diet, thin liquids by cup sip/ spoon   Meds: whole w/ puree   Strategies: upright posture, only feed when fully alert, slow rate of feeding, small bites/sips, no straws, quiet environment (tv off, limit talking, door closed, etc ) and alternating bites and sips, checking oral cavity for remnants post swallow  FULL supervision w/ meals  Results reviewed with:  patient and RN   Aspiration precautions posted  F/u ST tx: Pt will continue to benefit from ongoing skilled dysphagia tx sessions to establish safest least restrictive diet w/o increased oropharyngeal or aspiration sxs as well as monitor ability to carryover swallow strategies independently        Plan: Initiate level 2 food trials under SLP supervision ONLY           Trial thin liquids w/ straw sips            If increased signs/sxs of aspiration increased as dysphagia tx sessions " progress, consider repeat VFSS to determine current swallow function and least restrictive diet  Swallow Assessment Prognosis   Prognosis Fair   Prognosis Considerations Previous level of function; Co-morbidities;Participation level   SLP Therapy Minutes   SLP Time In 1130   SLP Time Out 1230   SLP Total Time (minutes) 60   SLP Mode of treatment - Individual (minutes) 15   SLP Mode of treatment - Concurrent (minutes) 45   SLP Mode of treatment - Group (minutes) 0   SLP Mode of treatment - Co-treat (minutes) 0   SLP Mode of Treatment - Total time(minutes) 60 minutes   SLP Cumulative Minutes 225   Therapy Time missed   Time missed?  No

## 2023-03-05 LAB
BACTERIA UR CULT: ABNORMAL
BACTERIA UR CULT: ABNORMAL

## 2023-03-05 RX ORDER — CIPROFLOXACIN 2 MG/ML
400 INJECTION, SOLUTION INTRAVENOUS EVERY 12 HOURS
Status: DISCONTINUED | OUTPATIENT
Start: 2023-03-05 | End: 2023-03-06

## 2023-03-05 RX ADMIN — MORPHINE SULFATE 15 MG: 15 TABLET, FILM COATED, EXTENDED RELEASE ORAL at 17:24

## 2023-03-05 RX ADMIN — GABAPENTIN 200 MG: 100 CAPSULE ORAL at 17:25

## 2023-03-05 RX ADMIN — ALUMINUM HYDROXIDE, MAGNESIUM HYDROXIDE, AND DIMETHICONE 30 ML: 200; 20; 200 SUSPENSION ORAL at 11:21

## 2023-03-05 RX ADMIN — BUPROPION HYDROCHLORIDE TABLETS 100 MG: 100 TABLET, FILM COATED ORAL at 17:25

## 2023-03-05 RX ADMIN — HYDROXYCHLOROQUINE SULFATE 400 MG: 200 TABLET ORAL at 09:33

## 2023-03-05 RX ADMIN — APIXABAN 5 MG: 5 TABLET, FILM COATED ORAL at 09:34

## 2023-03-05 RX ADMIN — MELATONIN 3 MG: at 21:54

## 2023-03-05 RX ADMIN — METOPROLOL SUCCINATE 25 MG: 25 TABLET, EXTENDED RELEASE ORAL at 17:25

## 2023-03-05 RX ADMIN — MORPHINE SULFATE 15 MG: 15 TABLET, FILM COATED, EXTENDED RELEASE ORAL at 09:34

## 2023-03-05 RX ADMIN — METOPROLOL SUCCINATE 25 MG: 25 TABLET, EXTENDED RELEASE ORAL at 09:34

## 2023-03-05 RX ADMIN — ATORVASTATIN CALCIUM 40 MG: 40 TABLET, FILM COATED ORAL at 17:25

## 2023-03-05 RX ADMIN — APIXABAN 5 MG: 5 TABLET, FILM COATED ORAL at 17:24

## 2023-03-05 RX ADMIN — BUPROPION HYDROCHLORIDE TABLETS 100 MG: 100 TABLET, FILM COATED ORAL at 09:33

## 2023-03-05 RX ADMIN — DULOXETINE HYDROCHLORIDE 60 MG: 60 CAPSULE, DELAYED RELEASE ORAL at 09:34

## 2023-03-05 RX ADMIN — CIPROFLOXACIN 400 MG: 2 INJECTION, SOLUTION INTRAVENOUS at 23:15

## 2023-03-05 RX ADMIN — LAMOTRIGINE 150 MG: 100 TABLET ORAL at 21:53

## 2023-03-05 RX ADMIN — SENNOSIDES AND DOCUSATE SODIUM 1 TABLET: 8.6; 5 TABLET ORAL at 09:34

## 2023-03-05 RX ADMIN — LIDOCAINE 5% 1 PATCH: 700 PATCH TOPICAL at 09:34

## 2023-03-05 RX ADMIN — DULOXETINE HYDROCHLORIDE 60 MG: 60 CAPSULE, DELAYED RELEASE ORAL at 21:53

## 2023-03-05 RX ADMIN — POLYETHYLENE GLYCOL 3350 17 G: 17 POWDER, FOR SOLUTION ORAL at 09:34

## 2023-03-05 RX ADMIN — SENNOSIDES AND DOCUSATE SODIUM 1 TABLET: 8.6; 5 TABLET ORAL at 17:24

## 2023-03-05 RX ADMIN — GABAPENTIN 200 MG: 100 CAPSULE ORAL at 09:34

## 2023-03-05 RX ADMIN — ACETAMINOPHEN 650 MG: 325 TABLET ORAL at 15:25

## 2023-03-05 NOTE — PCC SPEECH THERAPY
Pt currently being followed for speech, cognitive and dysphagia tx sessions  In regards to dysphagia, pt is demonstrating moderate oropharyngeal deficits to where current diet recommendations are for puree/thin liquids by cup only  Symptoms or concerns included decreased bolus acceptance, decreased bolus propulsion, decreased mastication, decreased bolus formation, suspected decreased control of soft solids and thin   and unintentional oral holding w/ soft solids  suspected pharyngeal swallow delay, suspected decreased hyolaryngeal elevation upon palpation, suspected pharyngeal residue, multiple swallows, audible swallows and throat clearing w/ solids, coughing w/ sips of thin and soft solids  Pt has been requesting liquids by straw, but due to decreased bolus control/timing of swallow initation and the increased aspiration sxs which are elicited given straw sips, still recommendations remain for no straws currently  It is likely that a repeat VFSS to determine current swallow function and least restrictive diet  As for speech, pt able to complete Motor Speech Eval, in which pt is demonstrating speech intelligibility to be moderately impaired at the word/phrase/sentence/conversational level  Pt noting to elicit the following errors: imprecise articulation, decreased breath support for speech, fast rate which impacts overall clarity at the word, phrase, sentence and conversational speech level  Pt w/ inconsistent awareness given this throughout assessment today  Additionally, pt has been working towards completing the CLQT+ as well targeting cognitive linguistic skills, to where pt is noted to exhibit deficits given attention, visual attention (L inattention), decreased ST/working memory, decreased executive functions (problem solving, reasoning, sequencing, organization of thoughts), decreased judgement and insight to overall deficits impacting safety and functional mobility   Currently pt will continue to benefit from ongoing skilled SLP services to maximize overall speech, cognitive and swallow function in establishing least restrictive diet in attempts to decrease caregiver burden at time of anticipated discharge home w/ family support/supervision  Update week of 3/13/2023: Pt continues to be followed for skilled SLP therapy targeting cognitive linguistic skills, speech skills and dysphagia  Regarding cognitive linguistic and speech skills, pt further presents with overall deficits, noting skills to frequently fluctuate  Pt remains with deficits and barriers in the following areas: attention, ST/working memory, processing, comprehension, expression, basic problem solving, executive functions, insight into deficits, organization of thoughts, sequencing, visualspatial skills/visual attention (L inattention) and impulsivity  Due to the above deficits, pt's overall functional independence and safety are impacted at this time  Pt also presents with fluctuating RHIANNON and times of increased lethargy, along with occasional hallucinations and confabulations, which impact independent functioning and safety  Regarding speech skills, pt demonstrates times of improvement but does continue to present with overall moderately dysarthric speech characterized by decreased breath support for speech, fast rate of speech, low volume and imprecise articulation of sounds, which impacts speech intelligibility at all levels of speech  Currently when fully awake and with increased alertness, pt is functioning at min assist for comprehension and mod assist for expression, problem solving and memory  Additionally, pt continues to present with oropharyngeal dysphagia and was recommended for completion of a VFSS to further assess swallow function as pt presenting with inconsistent overt s/s aspiration   VFSS completed on 3/9/2023 where pt demonstrating moderate to severe oropharyngeal dysphagia characterized by weak lip seal, anterior loss, decreased control of boluses, slow bolus manipulation, prolonged mastication, reduced bolus formation, delayed transfers, weak BOT retraction, reduced hyolaryngeal excursion, delayed swallow initiation, weak pharyngeal constriction which resulted in pharyngeal retention, and reduced airway protection  Pt exhibited laryngeal penetration across liquids, along with silent aspiration events with both thin liquids, as well as nectar thick taken by straw sips  Pt was then recommended to continue puree diet with change to nectar thick liquids by cup only  As pt demo limited independent carryover of recommended safe swallow strategies, pt is recommended to continue to have full supervision during meals to ensure carryover of these strategies  Pt's family was educated on results of VFSS, along with current diet recommendations  At this time, pt is recommended for further skilled SLP services to maximize overall speech, cognitive and swallow function in establishing least restrictive diet in attempts to decrease caregiver burden at time of anticipated discharge home with family support/supervision  Update from week of 3/20/2023: Pt conts to be followed for skilled SLP services targeting swallow and cognitive skills  In regards to swallow:  Pt conts with mild oral and mild- moderate pharyngeal dysphagia  Symptoms or concerns included decreased bolus propulsion, decreased bolus formation, delayed oral intiation, suspected decreased control of larger sips of NTL and ice chips , piecemeal deglutition, suspected pharyngeal swallow delay, suspected decreased hyolaryngeal elevation upon palpation, suspected pharyngeal residue, multiple swallows, effortful swallow, audible swallows and strong cough response post weak swallow with ice chips  Plan to cont to encourage and reinforce safe swallow strategies for increased independence with meals while trialing upgraded diet items of preference as able as appropriate   In regards to cognition: Pt conts to make progress towards cognitive goals but conts to fluctuate pending fatigue  Pt conts with deficits in ST/working memory, planning, reasoning, problem solving, insight, and executive functioning skills  Regarding speech skills, this appears to be improving but fluctuates as well pending attention to strategies and fatigue levels  Pt currently functioning at min A problem solving, memory, comprehension and expression  Pt will benefit from further skilled SLP services targeting speech and swallow skills to maximize overall function for increased independence and decreased burden of care upon safe discharge to next level of care  Recommendations would be for 24hr S/A at this time

## 2023-03-05 NOTE — PROGRESS NOTES
Internal Medicine Progress Note  Patient: Michell Raines  Age/sex: 75 y o  female  Medical Record #: [id number]      ASSESSMENT/PLAN: (Interval History)  Michell Raines is seen and examined and management for following issues:    Recent right MCA infarct W/petechial hemorrhage  • S/P thrombectomy by Dr Negin Catalan 2/12  • Continue Eliquis 5 mg twice daily/statin  • Follow-up with neurosurgery/neurology as scheduled     NICM  • EF has been as low as 10 to 15% in the past  • In 2022 was 65%  • Now down to 30% = pt declined LifeVest  • Cardiology felt this was secondary to her recent CVA as well as possible medication noncompliance  • They recommend continuing beta-blocker and adding ARB when BP allows  • Will need outpatient ischemia work-up  • Followed by Dr Tiki Jones at [facility] [place] however would like to follow with [facility] on dc  • On Toprol 25mg BID = held on 3/3/23 in the evening for BP 99/62 but has gotten since  • Have not been able to add back ARB yet 2/2 soft BPs and cant go down on Toprol dosing since HRs go into the low 100s at times     Seronegative rheumatoid arthritis  • A/T rheumatology takes Plaquenil 200 mg Monday through Friday 400 mg on Saturday and Sunday  • Continue same  • Follow-up on discharge      Chronic pain  • Due to compression fractures of her thoracic spine/chronic rib fractures  • Takes MS Contin 15 mg every 12 hours  • Has Oxycodone 5 mg immediate release for breakthrough pain at home  • According to the patient she takes only 1 dose of breakthrough oxycodone per day  • Pain management per PMR     Bipolar disorder  • Continue current medication regimen  • Follow-up with psychiatry on discharge  • Recommend Neuropsych consult     SHAR  • Refusing CPAP here  • OK use oxygen overnight but had none last night     Tachycardia/low O2 on pulse ox  • Some irregularity on exam in past but today RRR  • EKG with PVCs  • During the week, O2 sats improved when hands were warmed up   O2 sats not likely to be accurate due to circulation issues      Dysuria/Urinary frequency/incontinence  • PMR sent UA/cx 3/3/23 which is still pending  • Offered no complaints today        DC planning: TBD       The above assessment and plan was reviewed and updated as determined by my evaluation of the patient on 3/5/2023      Labs:   Results from last 7 days   Lab Units 03/02/23  0839 02/27/23  0938   WBC Thousand/uL 9 83 7 03   HEMOGLOBIN g/dL 13 5 13 9   HEMATOCRIT % 41 3 42 2   PLATELETS Thousands/uL 245 261     Results from last 7 days   Lab Units 03/02/23  0839 02/28/23  0519   SODIUM mmol/L 138 142   POTASSIUM mmol/L 4 2 4 2   CHLORIDE mmol/L 108 111*   CO2 mmol/L 31 26   BUN mg/dL 14 15   CREATININE mg/dL 0 91 0 83   CALCIUM mg/dL 10 6* 10 6*                   Review of Scheduled Meds:  Current Facility-Administered Medications   Medication Dose Route Frequency Provider Last Rate   • acetaminophen  650 mg Oral Q6H PRN Starr Villagomez, DO     • albuterol  2 puff Inhalation Q6H PRN Starr Villagomez, DO     • aluminum-magnesium hydroxide-simethicone  30 mL Oral Q4H PRN Starr Villagomez, DO     • apixaban  5 mg Oral BID [facility], DO     • atorvastatin  40 mg Oral QPM [facility] Villagomez, DO     • buPROPion  100 mg Oral BID [facility] Villagomez, DO     • DULoxetine  60 mg Oral BID [facility] Villagomez, DO     • gabapentin  200 [facility]     • glycerin-hypromellose-  1 drop Both Eyes Q4H PRN Starr Villagomez, DO     • hydroxychloroquine  200 mg Oral Once per day on Mon Tue Wed Thu Fri Starr Villagomez, DO     • hydroxychloroquine  400 mg Oral Once per day on Sun Sat Starr Villagomez, DO     • lamoTRIgine  150 mg Oral HS Starr Villagomez, DO     • lidocaine  1 patch Topical Daily Aleah Landis MD     • melatonin  3 mg Oral HS LAURA Shields     • menthol-methyl salicylate   Apply externally 4x Daily PRN Aleah Landis MD     • metoprolol succinate  25 mg Oral BID [place] Dose, CRNP     • morphine  15 mg Oral BID Gerardo Cabot, DO     • ondansetron  4 mg Oral Q6H PRN Gerardo Cabot, DO     • polyethylene glycol  17 g Oral Daily Gerardo Cabot, DO     • polyethylene glycol  17 g Oral Daily PRN Gerardo Cabot, DO     • senna-docusate sodium  1 tablet Oral BID Gerardo Cabot, DO         Subjective/ HPI: Patient seen and examined  Patients overnight issues or events were reviewed with nursing or staff during rounds or morning huddle session  New or overnight issues include the following:     No new or overnight issues  Offers no complaints    ROS:   A 10 point ROS was performed; negative except as noted above  Imaging:     No orders to display       *Labs /Radiology studies reviewed  *Medications reviewed and reconciled as needed  *Please refer to order section for additional ordered labs studies  *Case discussed with primary attending during morning huddle case rounds    Physical Examination:  Vitals:   Vitals:    03/04/23 1105 03/04/23 1429 03/04/23 2039 03/05/23 0700   BP:  108/60 97/66 112/71   BP Location:  Left arm Right arm Left arm   Pulse: 104 90 (!) 109 104   Resp:  18 18 17   Temp:  97 8 °F (36 6 °C) 97 5 °F (36 4 °C) 97 8 °F (36 6 °C)   TempSrc:  Oral Axillary Axillary   SpO2:  94% 97% 90%   Weight:       Height:           General Appearance: no distress, conversive  HEENT:  External ear normal   Nose normal w/o drainage  Mucous membranes are moist  Oropharynx is clear  Conjunctiva clear w/o icterus or redness  Neck:  Supple, normal ROM  Lungs: BBS without crackles/wheeze/rhonchi; respirations unlabored with normal inspiratory/expiratory effort  No retractions noted  On RA  CV: regular rate and rhythm; no rubs/murmurs/gallops, PMI normal   ABD: Abdomen is soft  Bowel sounds all quadrants  Nontender with no distention  EXT: no edema  Skin: normal turgor, normal texture, no rashes  Psych: affect normal, mood normal  Neuro: AAO; +dysarthria; +left sided weakness    She thought son was standing behind her today when she was sitting in the chair  The above physical exam was reviewed and updated as determined by my evaluation of the patient on 3/5/2023  Invasive Devices     Drain  Duration           External Urinary Catheter 4 days                   VTE Pharmacologic Prophylaxis: Eliquis  Code Status: Level 1 - Full Code  Current Length of Stay: 5 day(s)      Total time spent:  30 minutes with more than 50% spent counseling/coordinating care  Counseling includes discussion with patient re: progress  and discussion with patient of his/her current medical state/information  Coordination of patient's care was performed in conjunction with primary service  Time invested included review of patient's labs, vitals, and management of their comorbidities with continued monitoring  In addition, this patient was discussed with medical team including physician and advanced extenders  The care of the patient was extensively discussed and appropriate treatment plan was formulated unique for this patient  Medical decision making for the day was made by supervising physician unless otherwise noted in their attestation statement  ** Please Note:  voice to text software may have been used in the creation of this document   Although proof errors in transcription or interpretation are a potential of such software**

## 2023-03-05 NOTE — PLAN OF CARE
Problem: PAIN - ADULT  Goal: Verbalizes/displays adequate comfort level or baseline comfort level  Description: Interventions:  - Encourage patient to monitor pain and request assistance  - Assess pain using appropriate pain scale  - Administer analgesics based on type and severity of pain and evaluate response  - Implement non-pharmacological measures as appropriate and evaluate response  - Consider cultural and social influences on pain and pain management  - Notify physician/advanced practitioner if interventions unsuccessful or patient reports new pain  Outcome: Progressing     Problem: INFECTION - ADULT  Goal: Absence or prevention of progression during hospitalization  Description: INTERVENTIONS:  - Assess and monitor for signs and symptoms of infection  - Monitor lab/diagnostic results  - Monitor all insertion sites, i e  indwelling lines, tubes, and drains  - Monitor endotracheal if appropriate and nasal secretions for changes in amount and color  - [place] appropriate cooling/warming therapies per order  - Administer medications as ordered  - Instruct and encourage patient and family to use good hand hygiene technique  - Identify and instruct in appropriate isolation precautions for identified infection/condition  Outcome: Progressing  Goal: Absence of fever/infection during neutropenic period  Description: INTERVENTIONS:  - Monitor WBC    Outcome: Progressing     Problem: SAFETY ADULT  Goal: Patient will remain free of falls  Description: INTERVENTIONS:  - Educate patient/family on patient safety including physical limitations  - Instruct patient to call for assistance with activity   - Consult OT/PT to assist with strengthening/mobility   - Keep Call bell within reach  - Keep bed low and locked with side rails adjusted as appropriate  - Keep care items and personal belongings within reach  - Initiate and maintain comfort rounds  - Make Fall Risk Sign visible to staff  - Offer Toileting every Hours, in advance of need  - Initiate/Maintain alarm  - Obtain necessary fall risk management equipment:   - Apply yellow socks and bracelet for high fall risk patients  - Consider moving patient to room near nurses station  Outcome: Progressing  Goal: Maintain or return to baseline ADL function  Description: INTERVENTIONS:  -  Assess patient's ability to carry out ADLs; assess patient's baseline for ADL function and identify physical deficits which impact ability to perform ADLs (bathing, care of mouth/teeth, toileting, grooming, dressing, etc )  - Assess/evaluate cause of self-care deficits   - Assess range of motion  - Assess patient's mobility; develop plan if impaired  - Assess patient's need for assistive devices and provide as appropriate  - Encourage maximum independence but intervene and supervise when necessary  - Involve family in performance of ADLs  - Assess for home care needs following discharge   - Consider OT consult to assist with ADL evaluation and planning for discharge  - Provide patient education as appropriate  Outcome: Progressing  Goal: Maintains/Returns to pre admission functional level  Description: INTERVENTIONS:  - Perform BMAT or MOVE assessment daily    - Set and communicate daily mobility goal to care team and patient/family/caregiver  - Collaborate with rehabilitation services on mobility goals if consulted  - Perform Range of Motion times a day  - Reposition patient every  hours    - Dangle patient  times a day  - Stand patient  times a day  - Ambulate patient  times a day  - Out of bed to chair  times a day   - Out of bed for meals  times a day  - Out of bed for toileting  - Record patient progress and toleration of activity level   Outcome: Progressing     Problem: DISCHARGE PLANNING  Goal: Discharge to home or other facility with appropriate resources  Description: INTERVENTIONS:  - Identify barriers to discharge w/patient and caregiver  - Arrange for needed discharge resources and transportation as appropriate  - Identify discharge learning needs (meds, wound care, etc )  - Arrange for interpretive services to assist at discharge as needed  - Refer to Case Management Department for coordinating discharge planning if the patient needs post-hospital services based on physician/advanced practitioner order or complex needs related to functional status, cognitive ability, or social support system  Outcome: Progressing     Problem: Nutrition/Hydration-ADULT  Goal: Nutrient/Hydration intake appropriate for improving, restoring or maintaining nutritional needs  Description: Monitor and assess patient's nutrition/hydration status for malnutrition  Collaborate with interdisciplinary team and initiate plan and interventions as ordered  Monitor patient's weight and dietary intake as ordered or per policy  Utilize nutrition screening tool and intervene as necessary  Determine patient's food preferences and provide high-protein, high-caloric foods as appropriate       INTERVENTIONS:  - Monitor oral intake, urinary output, labs, and treatment plans  - Assess nutrition and hydration status and recommend course of action  - Evaluate amount of meals eaten  - Assist patient with eating if necessary   - Allow adequate time for meals  - Recommend/ encourage appropriate diets, oral nutritional supplements, and vitamin/mineral supplements  - Order, calculate, and assess calorie counts as needed  - Recommend, monitor, and adjust tube feedings and TPN/PPN based on assessed needs  - Assess need for intravenous fluids  - Provide specific nutrition/hydration education as appropriate  - Include patient/family/caregiver in decisions related to nutrition  Outcome: Progressing     Problem: Prexisting or High Potential for Compromised Skin Integrity  Goal: Skin integrity is maintained or improved  Description: INTERVENTIONS:  - Identify patients at risk for skin breakdown  - Assess and monitor skin integrity  - Assess and monitor nutrition and hydration status  - Monitor labs   - Assess for incontinence   - Turn and reposition patient  - Assist with mobility/ambulation  - Relieve pressure over bony prominences  - Avoid friction and shearing  - Provide appropriate hygiene as needed including keeping skin clean and dry  - Evaluate need for skin moisturizer/barrier cream  - Collaborate with interdisciplinary team   - Patient/family teaching  - Consider wound care consult   Outcome: Progressing     Problem: MOBILITY - ADULT  Goal: Maintain or return to baseline ADL function  Description: INTERVENTIONS:  -  Assess patient's ability to carry out ADLs; assess patient's baseline for ADL function and identify physical deficits which impact ability to perform ADLs (bathing, care of mouth/teeth, toileting, grooming, dressing, etc )  - Assess/evaluate cause of self-care deficits   - Assess range of motion  - Assess patient's mobility; develop plan if impaired  - Assess patient's need for assistive devices and provide as appropriate  - Encourage maximum independence but intervene and supervise when necessary  - Involve family in performance of ADLs  - Assess for home care needs following discharge   - Consider OT consult to assist with ADL evaluation and planning for discharge  - Provide patient education as appropriate  Outcome: Progressing  Goal: Maintains/Returns to pre admission functional level  Description: INTERVENTIONS:  - Perform BMAT or MOVE assessment daily    - Set and communicate daily mobility goal to care team and patient/family/caregiver  - Collaborate with rehabilitation services on mobility goals if consulted  - Perform Range of Motion  times a day  - Reposition patient every  hours    - Dangle patient  times a day  - Stand patient  times a day  - Ambulate patient  times a day  - Out of bed to chair  times a day   - Out of bed for meals  times a day  - Out of bed for toileting  - Record patient progress and toleration of activity level   Outcome: Progressing     Problem: Nutrition/Hydration-ADULT  Goal: Nutrient/Hydration intake appropriate for improving, restoring or maintaining nutritional needs  Description: Monitor and assess patient's nutrition/hydration status for malnutrition  Collaborate with interdisciplinary team and initiate plan and interventions as ordered  Monitor patient's weight and dietary intake as ordered or per policy  Utilize nutrition screening tool and intervene as necessary  Determine patient's food preferences and provide high-protein, high-caloric foods as appropriate       INTERVENTIONS:  - Monitor oral intake, urinary output, labs, and treatment plans  - Assess nutrition and hydration status and recommend course of action  - Evaluate amount of meals eaten  - Assist patient with eating if necessary   - Allow adequate time for meals  - Recommend/ encourage appropriate diets, oral nutritional supplements, and vitamin/mineral supplements  - Order, calculate, and assess calorie counts as needed  - Recommend, monitor, and adjust tube feedings and TPN/PPN based on assessed needs  - Assess need for intravenous fluids  - Provide specific nutrition/hydration education as appropriate  - Include patient/family/caregiver in decisions related to nutrition  Outcome: Progressing

## 2023-03-05 NOTE — QUICK NOTE
Rehab Dx: Stroke: 01 1 Left body involvement (right brain)    · Mentation: awake, interactive, slept well, however increased tiredness today  · Urine culture pending  · LBM 3/5      Noel Simpson, [address] [place] [address]

## 2023-03-06 PROBLEM — N39.0 UTI (URINARY TRACT INFECTION): Status: ACTIVE | Noted: 2023-03-06

## 2023-03-06 LAB
ALBUMIN SERPL BCP-MCNC: 3.1 G/DL (ref 3.5–5)
ALP SERPL-CCNC: 55 U/L (ref 46–116)
ALT SERPL W P-5'-P-CCNC: 18 U/L (ref 12–78)
ANION GAP SERPL CALCULATED.3IONS-SCNC: 1 MMOL/L (ref 4–13)
AST SERPL W P-5'-P-CCNC: 25 U/L (ref 5–45)
BASOPHILS # BLD AUTO: 0.04 THOUSANDS/ÂΜL (ref 0–0.1)
BASOPHILS NFR BLD AUTO: 1 % (ref 0–1)
BILIRUB SERPL-MCNC: 0.64 MG/DL (ref 0.2–1)
BUN SERPL-MCNC: 15 MG/DL (ref 5–25)
CA-I BLD-SCNC: 1.34 MMOL/L (ref 1.12–1.32)
CALCIUM ALBUM COR SERPL-MCNC: 12 MG/DL (ref 8.3–10.1)
CALCIUM SERPL-MCNC: 11.3 MG/DL (ref 8.3–10.1)
CHLORIDE SERPL-SCNC: 108 MMOL/L (ref 96–108)
CO2 SERPL-SCNC: 29 MMOL/L (ref 21–32)
CREAT SERPL-MCNC: 1.07 MG/DL (ref 0.6–1.3)
EOSINOPHIL # BLD AUTO: 0.11 THOUSAND/ÂΜL (ref 0–0.61)
EOSINOPHIL NFR BLD AUTO: 2 % (ref 0–6)
ERYTHROCYTE [DISTWIDTH] IN BLOOD BY AUTOMATED COUNT: 14.5 % (ref 11.6–15.1)
GFR SERPL CREATININE-BSD FRML MDRD: 53 ML/MIN/1.73SQ M
GLUCOSE SERPL-MCNC: 80 MG/DL (ref 65–140)
HCT VFR BLD AUTO: 41.5 % (ref 34.8–46.1)
HGB BLD-MCNC: 13.1 G/DL (ref 11.5–15.4)
IMM GRANULOCYTES # BLD AUTO: 0.03 THOUSAND/UL (ref 0–0.2)
IMM GRANULOCYTES NFR BLD AUTO: 0 % (ref 0–2)
LYMPHOCYTES # BLD AUTO: 0.99 THOUSANDS/ÂΜL (ref 0.6–4.47)
LYMPHOCYTES NFR BLD AUTO: 13 % (ref 14–44)
MCH RBC QN AUTO: 31.2 PG (ref 26.8–34.3)
MCHC RBC AUTO-ENTMCNC: 31.6 G/DL (ref 31.4–37.4)
MCV RBC AUTO: 99 FL (ref 82–98)
MONOCYTES # BLD AUTO: 0.68 THOUSAND/ÂΜL (ref 0.17–1.22)
MONOCYTES NFR BLD AUTO: 9 % (ref 4–12)
NEUTROPHILS # BLD AUTO: 5.63 THOUSANDS/ÂΜL (ref 1.85–7.62)
NEUTS SEG NFR BLD AUTO: 75 % (ref 43–75)
NRBC BLD AUTO-RTO: 0 /100 WBCS
PLATELET # BLD AUTO: 262 THOUSANDS/UL (ref 149–390)
PMV BLD AUTO: 10.2 FL (ref 8.9–12.7)
POTASSIUM SERPL-SCNC: 3.9 MMOL/L (ref 3.5–5.3)
PROT SERPL-MCNC: 6.6 G/DL (ref 6.4–8.4)
PTH RELATED PROT SERPL-SCNC: <2 PMOL/L
RBC # BLD AUTO: 4.2 MILLION/UL (ref 3.81–5.12)
SODIUM SERPL-SCNC: 138 MMOL/L (ref 135–147)
WBC # BLD AUTO: 7.48 THOUSAND/UL (ref 4.31–10.16)

## 2023-03-06 RX ORDER — CEFPODOXIME PROXETIL 200 MG/1
200 TABLET, FILM COATED ORAL 2 TIMES DAILY WITH MEALS
Status: COMPLETED | OUTPATIENT
Start: 2023-03-06 | End: 2023-03-10

## 2023-03-06 RX ADMIN — BUPROPION HYDROCHLORIDE TABLETS 100 MG: 100 TABLET, FILM COATED ORAL at 08:21

## 2023-03-06 RX ADMIN — APIXABAN 5 MG: 5 TABLET, FILM COATED ORAL at 08:14

## 2023-03-06 RX ADMIN — MELATONIN 3 MG: at 21:24

## 2023-03-06 RX ADMIN — MORPHINE SULFATE 15 MG: 15 TABLET, FILM COATED, EXTENDED RELEASE ORAL at 17:36

## 2023-03-06 RX ADMIN — LIDOCAINE 5% 1 PATCH: 700 PATCH TOPICAL at 08:14

## 2023-03-06 RX ADMIN — DULOXETINE HYDROCHLORIDE 60 MG: 60 CAPSULE, DELAYED RELEASE ORAL at 21:24

## 2023-03-06 RX ADMIN — METOPROLOL SUCCINATE 25 MG: 25 TABLET, EXTENDED RELEASE ORAL at 08:14

## 2023-03-06 RX ADMIN — BUPROPION HYDROCHLORIDE TABLETS 100 MG: 100 TABLET, FILM COATED ORAL at 19:33

## 2023-03-06 RX ADMIN — METOPROLOL SUCCINATE 25 MG: 25 TABLET, EXTENDED RELEASE ORAL at 17:40

## 2023-03-06 RX ADMIN — SENNOSIDES AND DOCUSATE SODIUM 1 TABLET: 8.6; 5 TABLET ORAL at 17:39

## 2023-03-06 RX ADMIN — ATORVASTATIN CALCIUM 40 MG: 40 TABLET, FILM COATED ORAL at 17:40

## 2023-03-06 RX ADMIN — SENNOSIDES AND DOCUSATE SODIUM 1 TABLET: 8.6; 5 TABLET ORAL at 08:14

## 2023-03-06 RX ADMIN — CEFPODOXIME PROXETIL 200 MG: 200 TABLET, FILM COATED ORAL at 17:39

## 2023-03-06 RX ADMIN — MENTHOL, UNSPECIFIED FORM AND METHYL SALICYLATE: 10; 150 CREAM TOPICAL at 12:27

## 2023-03-06 RX ADMIN — LAMOTRIGINE 150 MG: 100 TABLET ORAL at 21:24

## 2023-03-06 RX ADMIN — ACETAMINOPHEN 650 MG: 325 TABLET ORAL at 12:27

## 2023-03-06 RX ADMIN — APIXABAN 5 MG: 5 TABLET, FILM COATED ORAL at 17:40

## 2023-03-06 RX ADMIN — CIPROFLOXACIN 400 MG: 2 INJECTION, SOLUTION INTRAVENOUS at 07:44

## 2023-03-06 RX ADMIN — MORPHINE SULFATE 15 MG: 15 TABLET, FILM COATED, EXTENDED RELEASE ORAL at 08:14

## 2023-03-06 RX ADMIN — GABAPENTIN 200 MG: 100 CAPSULE ORAL at 08:14

## 2023-03-06 RX ADMIN — ALUMINUM HYDROXIDE, MAGNESIUM HYDROXIDE, AND DIMETHICONE 30 ML: 200; 20; 200 SUSPENSION ORAL at 18:28

## 2023-03-06 RX ADMIN — HYDROXYCHLOROQUINE SULFATE 200 MG: 200 TABLET ORAL at 07:44

## 2023-03-06 RX ADMIN — POLYETHYLENE GLYCOL 3350 17 G: 17 POWDER, FOR SOLUTION ORAL at 08:14

## 2023-03-06 RX ADMIN — GABAPENTIN 200 MG: 100 CAPSULE ORAL at 17:40

## 2023-03-06 RX ADMIN — DULOXETINE HYDROCHLORIDE 60 MG: 60 CAPSULE, DELAYED RELEASE ORAL at 08:14

## 2023-03-06 NOTE — PROGRESS NOTES
"Occupational Therapy Treatment Note         03/06/23 0720   Pain Assessment   Pain Assessment Tool 0-10   Pain Score No Pain   Restrictions/Precautions   Precautions Aspiration;Bed/chair alarms;Cognitive; Fall Risk;Pain;Supervision on toilet/commode   Sit to Stand   Type of Assistance Needed Physical assistance   Physical Assistance Level 25% or less   Comment min assist on L   Sit to Stand CARE Score 3   Bed-Chair Transfer   Type of Assistance Needed Physical assistance   Physical Assistance Level 26%-50%   Comment to/from bathroom with hand hold assist   Chair/Bed-to-Chair Transfer CARE Score 3   Toileting Hygiene   Type of Assistance Needed Physical assistance   Physical Assistance Level 51%-75%   Comment pt incontinent of urine  required assist to clean floor and to thoroughly complete perineal hygiene after incontinent episode   Toileting Hygiene CARE Score 2   Toilet Transfer   Type of Assistance Needed Physical assistance; Adaptive equipment   Physical Assistance Level 26%-50%   Comment standard toilet with L grab bar   Toilet Transfer CARE Score 3   Cognition   Overall Cognitive Status Impaired   Arousal/Participation Alert   Attention Attends with cues to redirect   Orientation Level Oriented to person   Memory Decreased short term memory   Following Commands Follows one step commands with increased time or repetition   Assessment   Treatment Assessment OT entered hallway and pt's alarm going off  Pt near bathroom doorway and nursing present  Pt confused, reporting she thought she was at her friend's house and pt was urinating on way into bathroom stating \"I have to go\"  Pt was assisted in bathroom and required assist for hygiene after incontinent episode  After initial confusion, pt able to recall month, place, situation, and year  Pt able to demonstrate ability to use her call bell  Pt left positioned in recliner with call bell in reach and chair alarm on   Reviewed with pt 4x need to call for assistance and " she cannot transfer herself, which pt able to verbally recall  Of note, per chart review pt with + urine culture over weekend  Prognosis Fair   Problem List Decreased strength;Decreased endurance; Impaired balance;Decreased mobility; Decreased coordination; Impaired judgement;Decreased cognition;Decreased safety awareness   Barriers to Discharge Inaccessible home environment;Decreased caregiver support   Plan   Treatment/Interventions Functional transfer training;ADL retraining; Therapeutic exercise; Endurance training;Cognitive reorientation;Patient/family training;Equipment eval/education; Bed mobility; Compensatory technique education   Progress Progressing toward goals   OT Therapy Minutes   OT Time In 0720   OT Time Out 0745   OT Total Time (minutes) 25   OT Mode of treatment - Individual (minutes) 0   OT Mode of treatment - Concurrent (minutes) 25   OT Mode of treatment - Group (minutes) 0   OT Mode of treatment - Co-treat (minutes) 0   OT Mode of Treatment - Total time(minutes) 25 minutes   OT Cumulative Minutes 435   Therapy Time missed   Time missed?  No

## 2023-03-06 NOTE — QUICK NOTE
Notified by RN, urine culture +CRE       · Ciprofloxacin 400 mg q12h for 5 days  · Contact precautions  · Insert PIV    Sang Cantrell, [address] [facility]

## 2023-03-06 NOTE — PROGRESS NOTES
"   03/06/23 1340   Pain Assessment   Pain Assessment Tool 0-10   Pain Score 3   Pain Location/Orientation Orientation: Lower; Location: Back   Restrictions/Precautions   Precautions Aspiration;Bed/chair alarms;Cognitive; Fall Risk;Contact/isolation;Pain;Supervision on toilet/commode;Visual deficit  (dysphasia, L-inattention)   Weight Bearing Restrictions No   ROM Restrictions No   Lifestyle   Autonomy \"Can I have a stroke book, I think my family took it home  \"   Eating   Type of Assistance Needed Supervision;Set-up / clean-up   Physical Assistance Level No physical assistance   Comment S and set-up to drink from open cup x4 sips during session  Eating CARE Score 4   Assessment   Treatment Assessment Seen for OT session w/ focus on stroke edu: stroke 101  Pt requested new stroke edu booklet, provided w/ same  Prognosis Fair   Problem List Decreased strength;Decreased endurance; Impaired balance;Decreased mobility; Decreased coordination; Impaired judgement;Decreased cognition;Decreased safety awareness   Plan   Treatment/Interventions Patient/family training   Progress Slow progress, decreased activity tolerance   Recommendation   OT Discharge Recommendation   (pending progress)   OT Therapy Minutes   OT Time In 1340   OT Time Out 1405   OT Total Time (minutes) 25   OT Mode of treatment - Individual (minutes) 25   OT Mode of treatment - Concurrent (minutes) 0   OT Mode of treatment - Group (minutes) 0   OT Mode of treatment - Co-treat (minutes) 0   OT Mode of Treatment - Total time(minutes) 25 minutes   OT Cumulative Minutes 545   Therapy Time missed   Time missed? No     Stroke Education Series    Pt participated in skilled Stroke Education Series in an individual setting to address the topic of Stroke 101: Understanding the Basics of Stroke in both verbal and written formats   Education within this session reviewed the basic structural and functional components of the brain and included information on the causes of " stroke, related signs/symptoms, risk factors, and the process of stroke rehabilitation  The goal of this education was to provide the patient with general understanding of how the brain functions and how a stroke can impact his/her functional mobility and independence  In addition, the intention of this education is to provide the patient with the information to reduce the risk of a second stroke  Following education, pt's response to education is: verbalizes understanding  To individualize the education, the following topics were included based upon the patients' past and current medical history: atrial fibrillation, family history, hypercoagulable state, and hypertension  Additional topics that were covered include weakness, decreased coordination, dysarthria, dysphagia, cognitive changes, anxiety, pain, nutrition, fall prevention, self-advocacy, and prevention of new conditions and/or worsening condition       Start Time: 2823  End Time: 2043

## 2023-03-06 NOTE — PROGRESS NOTES
Internal Medicine Progress Note  Patient: Angelina Matthews  Age/sex: 75 y o  female  Medical Record #: [id number]      ASSESSMENT/PLAN: (Interval History)  Angelina Matthews is seen and examined and management for following issues:    Recent right MCA infarct W/petechial hemorrhage  • S/P thrombectomy by Dr Leonarda Burden 2/12  • Continue Eliquis 5 mg twice daily/statin  • Follow-up with neurosurgery/neurology as scheduled     NICM  • EF has been as low as 10 to 15% in the past  • In 2022 was 65%  • Now down to 30% = pt declined LifeVest  • Cardiology felt this was secondary to her recent CVA as well as possible medication noncompliance  • They recommend continuing beta-blocker and adding ARB when BP allows  • Will need outpatient ischemia work-up  • Followed by Dr Stuart Adan at [facility] however would like to follow with [facility] on dc  • Have not been able to add back ARB yet 2/2 soft BPs and cant go down on Toprol dosing since HRs go into the low 100s at times     Seronegative rheumatoid arthritis  • A/T rheumatology takes Plaquenil 200 mg Monday through Friday 400 mg on Saturday and Sunday  • Continue same  • Follow-up on discharge      Chronic pain  • Due to compression fractures of her thoracic spine/chronic rib fractures  • Takes MS Contin 15 mg every 12 hours  • Has Oxycodone 5 mg immediate release for breakthrough pain at home  • According to the patient she takes only 1 dose of breakthrough oxycodone per day  • Pain management per PMR     Bipolar disorder  • Continue current medication regimen  • Follow-up with psychiatry on discharge  • Recommend Neuropsych consult     SHAR  • Refusing CPAP here  • OK use oxygen overnight     Tachycardia/low O2 on pulse ox  • Some irregularity on exam in past but today RRR  • EKG with PVCs  • During the week, O2 sats improved when hands were warmed up   O2 sats not likely to be accurate due to circulation issues      Dysuria/Urinary frequency/incontinence  • Urine culture +CRE  • PMR started IV Cipro 400mg every 12 hours for 5 days  • Offered no complaints today        DC planning: TBD       The above assessment and plan was reviewed and updated as determined by my evaluation of the patient on 3/6/2023      Labs:   Results from last 7 days   Lab Units 03/02/23  0839 02/27/23  0938   WBC Thousand/uL 9 83 7 03   HEMOGLOBIN g/dL 13 5 13 9   HEMATOCRIT % 41 3 42 2   PLATELETS Thousands/uL 245 261     Results from last 7 days   Lab Units 03/02/23  0839 02/28/23  0519   SODIUM mmol/L 138 142   POTASSIUM mmol/L 4 2 4 2   CHLORIDE mmol/L 108 111*   CO2 mmol/L 31 26   BUN mg/dL 14 15   CREATININE mg/dL 0 91 0 83   CALCIUM mg/dL 10 6* 10 6*                   Review of Scheduled Meds:  Current Facility-Administered Medications   Medication Dose Route Frequency Provider Last Rate   • acetaminophen  650 mg Oral Q6H PRN Klaudia Fonseca DO     • albuterol  2 puff Inhalation Q6H PRN Klaudia Fonseca DO     • aluminum-magnesium hydroxide-simethicone  30 mL Oral Q4H PRN Klaudia Fonseca DO     • apixaban  5 mg Oral BID Klaudia Fonseca DO     • atorvastatin  40 mg Oral QPM Klaudia Fonseca DO     • buPROPion  100 mg Oral BID Klaudia Fonseca DO     • ciprofloxacin  400 mg Intravenous Q12H LAURA Rutledge 400 mg (03/06/23 0744)   • DULoxetine  60 mg Oral BID Klaudia Fonseca DO     • gabapentin  200 mg Oral BID Klaudia Fonseca DO     • glycerin-hypromellose-  1 drop Both Eyes Q4H PRN Klaudia Fonseca DO     • hydroxychloroquine  200 mg Oral Once per day on Mon Tue Wed Thu Fri Klaudia Fonseca DO     • hydroxychloroquine  400 mg Oral Once per day on Sun Sat Klaudia Fonseca DO     • lamoTRIgine  150 mg Oral HS Klaudia Fonseca DO     • lidocaine  1 patch Topical Daily Kobi Camarena MD     • melatonin  3 mg Oral HS LAURA Rutledge     • menthol-methyl salicylate   Apply externally 4x Daily PRN Kobi Camarena MD     • metoprolol succinate  25 mg Oral BID LAURA Whittington     • morphine  15 mg Oral BID Trygve Marian, DO     • ondansetron  4 mg Oral Q6H PRN Trygve Marian, DO     • polyethylene glycol  17 g Oral Daily Trygve Marian, DO     • polyethylene glycol  17 g Oral Daily PRN Trygve Marian, DO     • senna-docusate sodium  1 tablet Oral BID Trygve Marian, DO         Subjective/ HPI: Patient seen and examined  Patients overnight issues or events were reviewed with nursing or staff during rounds or morning huddle session  New or overnight issues include the following:     Pt seen in her room during OT  She had some confusion this AM  She was found on her way to the bathroom  She is currently alert and oriented x 4  She is concerned that she looks jaundiced  She denies any other complaints  ROS:   A 10 point ROS was performed; negative except as noted above  Imaging:     No orders to display       *Labs /Radiology studies reviewed  *Medications reviewed and reconciled as needed  *Please refer to order section for additional ordered labs studies  *Case discussed with primary attending during morning huddle case rounds    Physical Examination:  Vitals:   Vitals:    03/05/23 1458 03/05/23 1525 03/05/23 2100 03/06/23 0600   BP: 108/69  101/62 116/70   BP Location: Left arm  Left arm Left arm   Pulse:   84 105   Resp: 16  18 18   Temp:  97 8 °F (36 6 °C) 97 5 °F (36 4 °C) 98 2 °F (36 8 °C)   TempSrc:  Oral Axillary Axillary   SpO2:  94% 99% 91%   Weight:       Height:           General Appearance: no distress, conversive  HEENT:  External ear normal   Nose normal w/o drainage  Mucous membranes are moist  Oropharynx is clear  Conjunctiva clear w/o icterus or redness  Neck:  Supple, normal ROM  Lungs: BBS without crackles/wheeze/rhonchi; respirations unlabored with normal inspiratory/expiratory effort  No retractions noted  On RA  CV: regular rate and rhythm; no rubs/murmurs/gallops, PMI normal   ABD: Abdomen is soft  Bowel sounds all quadrants  Nontender with no distention      EXT: no edema  Skin: normal turgor, normal texture, no rashes  Psych: affect normal, mood normal  Neuro: AAOx4; +dysarthria; +left sided weakness  The above physical exam was reviewed and updated as determined by my evaluation of the patient on 3/6/2023  Invasive Devices     Peripheral Intravenous Line  Duration           Peripheral IV 03/05/23 Right; Lower Forearm <1 day          Drain  Duration           External Urinary Catheter 5 days                   VTE Pharmacologic Prophylaxis: Eliquis  Code Status: Level 1 - Full Code  Current Length of Stay: 6 day(s)      Total time spent:  30 minutes with more than 50% spent counseling/coordinating care  Counseling includes discussion with patient re: progress  and discussion with patient of his/her current medical state/information  Coordination of patient's care was performed in conjunction with primary service  Time invested included review of patient's labs, vitals, and management of their comorbidities with continued monitoring  In addition, this patient was discussed with medical team including physician and advanced extenders  The care of the patient was extensively discussed and appropriate treatment plan was formulated unique for this patient  Medical decision making for the day was made by supervising physician unless otherwise noted in their attestation statement  ** Please Note:  voice to text software may have been used in the creation of this document   Although proof errors in transcription or interpretation are a potential of such software**

## 2023-03-06 NOTE — PROGRESS NOTES
03/06/23 1240   Pain Assessment   Pain Assessment Tool 0-10   Pain Score No Pain   Restrictions/Precautions   Precautions Aspiration;Bed/chair alarms;Cognitive; Fall Risk;Contact/isolation;Pain;Supervision on toilet/commode;Visual deficit   Comprehension   Comprehension (FIM) 3 - Understands basic info/conversation 50-74% of time   Expression   Expression (FIM) 3 - Expresses basic info/needs 50-74% of time   Social Interaction   Social Interaction (FIM) 5 - Needs monitoring/encouragement  to participate/interact   Problem Solving   Problem solving (FIM) 2 - Solves basic problems 25-49% of time   Memory   Memory (FIM) 2 - Recognizes, recalls/performs 25-49%   Speech/Language/Cognition Assessmetn   Treatment Assessment Pt completed portions of the CLQT+ as a continuation of initial evaluation  A Composite Severity Rating score has not been generated due to incompletion of test   Pt's Cognitive Domain Scores have yet to be computed  SLP attempted to administer maze section and design generation  Patient obtained a score of 0 for mazes and 0 for design generation  Patient initiated attempting tasks but was unable to accurately complete within time constraints  Possible onset of dx  Of UTI may have further impacted abilities  CLQT+ was not complete symbol trails section  Patient distractible during task requiring redirection, verbal cues, and repetitions of instructions  See swallow assessment ST note for details regarding dysphagia session     Eating   Type of Assistance Needed Set-up / clean-up;Supervision;Verbal cues   Physical Assistance Level No physical assistance   Eating CARE Score 4   Swallow Assessment   Swallow Treatment Assessment Daily Dysphagia Tx Note      Patient Name: Jonah Franks     SIQYR'X Date: 3/6/2023        Current Risks for Dysphagia & Aspiration: Weak voicing; weak cough; dysarthria; general debilitation; new neuro event; brain injury; cognitive deficit; hx neurologic dx; positioning issues     Current Symptoms/Concerns: Cough; throat clear; with liquids; during meals; with pills; difficulty chewing; decreased oral intake; hx of dysphagia/aspiration     Current diet: puree/level 1 diet and thin liquids      Premorbid diet::upon d/c from prior admission, pt appeared to be on dysphagia level 2/thin liquids upon prior admission for stroke initially; unknown prior to admission, pt rx being on softer foods as well as thickened liquids in the past       Follows commands: needs cues and repetitions       Cognitive Status: Awake, alert and cooperative      Positioning: upright in recliner     Items administered:Consistencies Administered: thin liquids, level 2 soft solids   Materials administered included level 2 ground noodles and cheese, dysphagia level 2 tuna, diced peaches, lemon meringue pie, coffee      Total amount of meal consumed:   25% of meal   Approximately 60 cc of thin         Oral stage:moderate  Lip closure: mild-mod   Anterior spillage: x 5 w/ PO intake of soft solids; cue x1 to clear with use of napkin; all other occasions patient initiated clearing independently   Mastication: mod-severe prolonged; uneffective to fully breakdown soft solids (peaches and noodles); decreased rotary movement    Bolus formation: mod-severe  Bolus control: mod  Transfer: delayed   Oral residue: talking with PO sitting in oral cavity   Pocketing: x5 with noodles and peaches            Pharyngeal stage:moderate  Swallow promptness: delayed w/ solids and liquids   Hyolaryngeal elevation:  decreased upon palpation and visual assessment   Wet voice: x3 following suspected penetration with diced peaches; x2 following thin liquids  Throat clear: x 5 with soft solids    Cough: x5  Secondary swallows: multiple swallows throughout  Audible swallows:observed throughout; increased with larger bolus size       Esophageal stage: patient reports heartburn/ indigestion            Summary:     Pt presenting with overall moderate-severe oral and moderate pharyngeal dysphagia today  Symptoms or concerns included decreased labial closure resulting in anterior spillage, shoveling despite max verbal cues, decreased mastication, oral residue, pocketing, decreased bolus formation,  pharyngeal swallow delay, decreased hyolaryngeal elevation upon palpation and observation, suspected pharyngeal penetration with soft solids d/t throat clear and wet vocal quality residue, multiple swallows, audible swallows, and throat clearing/ cough throughout  Patient seen for skilled St session to address dysphagia  Patient seen for trial lunchtime tray of dysphagia level 2 items consisting of ground pasta/ noodle rings with sauce and melted cheese on top, tuna salad in bowl, diced peaches, lemon meringue pie, and coffee  Patient able to self-feed following set-up from SLP  Patient initiated meal with intake of pasta  Patient with intake of melted cheese from top of pasta; patient noting globus sensation and difficulty manipulating cheese texture  SLP removed cheese from pasta  Patient shoveling pasta into oral cavity despite cues to clear mouth prior to placing more PO in mouth  Patient would verbally agree but continued to place more food into mouth prior to clearing resulting in oral residue and pocketing  Patient was able to clear residue and pocketing with max verbal cues and utilization of liquid wash  Patient refusing tuna salad  Patient agreeable to pie and peaches  Patient with adequate tolerance of soft pie texture  Anterior spillage observed suspected due to sticky texture resulting in large bolus size  SLP provided verbal cues to clear residue with napkin as patient did not display awareness  No anterior loss with cup sips of thin liquids  Decreased rotary mastication with difficulty with lingual lateralization/ROM resulting in extended mastication time and decreased bolus formation    Patient continued to talk throughout meal despite education and cues resulting in further decreased oral skills  Overall A-P transfer and swallow initiation delayed w/ solids  Hyolaryngeal elevation decreased  SLP observed cough, throat clear, wet vocal quality, and audible swallows throughout meal indicating suspected penetration throughout meal   Patient educated to utilize smaller bites and sips, utilize liquid wash, clearing mouth prior to placing more food in oral cavity, reduce talking with PO in mouth, and current diet recommendations  Pt stated understanding and agreeable at this time  Pt continues to warrant f/u dysphagia tx to assess tolerance of current diet and appropriateness for diet upgrade and trial straw sips of thin as appropriate  Patient continues to report goals to utilize straw  Patient with overall poor tolerance of level 2 trial   Recommend VFSS to aid in determining LRD and guiding POC  OT notes dx  Of UTI potentially further impacting skills  Recommendations:  Diet: puree/level 1 diet, thin liquids by cup sip/ spoon   Meds: whole w/ puree   Strategies: upright posture, only feed when fully alert, slow rate of feeding, small bites/sips, no straws, quiet environment (tv off, limit talking, door closed, etc ) and alternating bites and sips, checking oral cavity for remnants post swallow  FULL supervision w/ meals  Results reviewed with:  patient and RN   Aspiration precautions posted  F/u ST tx: Pt will continue to benefit from ongoing skilled dysphagia tx sessions to establish safest least restrictive diet w/o increased oropharyngeal or aspiration sxs as well as monitor ability to carryover swallow strategies independently     Swallow Assessment Prognosis   Prognosis Guarded   Prognosis Considerations Medical diagnosis;Previous level of function;Potential;Severity of impairments;New learning ability;Ability to carry over   SLP Therapy Minutes   SLP Time In [facility]   SLP Total Time (minutes) 60   Therapy Time missed   Time missed?  No

## 2023-03-06 NOTE — PROGRESS NOTES
03/06/23 1030   Pain Assessment   Pain Assessment Tool 0-10   Pain Score 2   Pain Location/Orientation Orientation: Lower; Location: Back   Pain Onset/Description Onset: Ongoing   Patient's Stated Pain Goal No pain   Hospital Pain Intervention(s) Repositioned; Rest   Restrictions/Precautions   Precautions Aspiration;Bed/chair alarms;Cognitive; Fall Risk;Impulsive;O2;Pain;Supervision on toilet/commode;Visual deficit  (L inattention)   Weight Bearing Restrictions No   ROM Restrictions No   Cognition   Overall Cognitive Status Impaired   Arousal/Participation Alert; Cooperative   Attention Difficulty attending to directions   Orientation Level Oriented to person;Oriented to time;Oriented to situation;Disoriented to place   Memory Decreased short term memory;Decreased recall of precautions   Following Commands Follows one step commands with increased time or repetition   Lying to Sitting on Side of Bed   Type of Assistance Needed Physical assistance;Verbal cues; Adaptive equipment   Physical Assistance Level 25% or less   Comment HOB slightly elevated   Lying to Sitting on Side of Bed CARE Score 3   Sit to Stand   Type of Assistance Needed Physical assistance;Verbal cues   Physical Assistance Level 26%-50%   Comment Varied MIN to MODA with VC's for hand placement   Sit to Stand CARE Score 3   Bed-Chair Transfer   Type of Assistance Needed Physical assistance;Verbal cues   Physical Assistance Level 26%-50%   Comment HHA on R, slight L lateral sway   Chair/Bed-to-Chair Transfer CARE Score 3   Transfer Bed/Chair/Wheelchair   Limitations Noted In Balance; Coordination;Problem Solving; Endurance   Adaptive Equipment Hand Hold   Walk 10 Feet   Type of Assistance Needed Physical assistance;Verbal cues   Physical Assistance Level 26%-50%   Comment overall DAVID but can vary to MODA with LOB   Walk 10 Feet CARE Score 3   Walk 50 Feet with Two Turns   Type of Assistance Needed Physical assistance;Verbal cues   Physical Assistance Level 26%-50%   Comment overall DAVID but can vary to MODA with LOB   Walk 50 Feet with Two Turns CARE Score 3   Ambulation   Primary Mode of Locomotion Prior to Admission Walk   Distance Walked (feet) 50 ft  (x4)   Assist Device Hand Hold   Gait Pattern Ataxic; Slow Amy;Decreased foot clearance; Forward Flexion;Narrow AYLA;Step to;Decreased L stance; Improper weight shift; Lateral deviation   Limitations Noted In Balance; Coordination; Endurance; Heel Strike;Posture;Midline Orientation; Safety;Speed;Strength;Swing   Provided Assistance with: Balance;Direction;Weight Shift   Walk Assist Level Moderate Assist   Does the patient walk? 2  Yes   Wheel 50 Feet with Two Turns   Type of Assistance Needed Physical assistance;Verbal cues   Physical Assistance Level 26%-50%   Comment CS at times but overall DAVID   Wheel 50 Feet with Two Turns CARE Score 3   Wheelchair mobility   Type of Wheelchair Used 1  Manual   Method Right upper extremity;Right lower extremity; Left lower extremity   Assistance Provided For Remove Leg Rest;Replace Leg Rest;Remove armrests;Replace armrests; Locking Brakes;Obstacles   Distance Level Surface (feet) 100 ft  (50')   Curb or Single Stair   Style negotiated Single stair   Type of Assistance Needed Physical assistance;Verbal cues; Adaptive equipment   Physical Assistance Level 51%-75%   Comment MAX VC's for safety  pt missed step with L foot at first and was impulsive trying to amb up steps without L hand placed correctly  1 Step (Curb) CARE Score 2   4 Steps   Type of Assistance Needed Physical assistance;Verbal cues; Adaptive equipment   Physical Assistance Level 51%-75%   Comment MAX VC's for safety  pt missed step with L foot at first and was impulsive trying to amb up steps without L hand placed correctly     4 Steps CARE Score 2   12 Steps   Reason if not Attempted Safety concerns   12 Steps CARE Score 88   Stairs   Type Stairs   # of Steps 6   Weight Bearing Precautions Fall Risk  (L inattention) Assist Devices Bilateral Rail   Findings retro descend   Assessment   Treatment Assessment Pt participated in skilled PT session with increased focus on NPP gait, increased act tolerance, stair management and improved attention  Pt initially requiring increased VC's to stay alert during session  Pt o2 remained 92%-07% on RA during session but placed back on o2 via NC in room as pt was fatigued and most likely will fall asleep  Pt cont to be limited by poor attention, decreased alertness, decreased caregiver support, decreased insight into current deficits, decreased safety and poor righting reactions  Pt is amb up to 70 feet HHA on R with MIN/MODA  Pt able to amb up/down 6 steps with BHR's with retro descend, MODA and MAX VC's  Pt performed up to 100' WC mobility with MIN/MODA and MOD VC's  Pt will cont POC as tolerated with focus on improved gait, increased balance, increased stair management, increased righting reactions and safety awareness  Problem List Decreased strength;Decreased endurance; Impaired balance;Decreased mobility; Decreased coordination; Impaired judgement;Decreased cognition;Decreased safety awareness   Barriers to Discharge Inaccessible home environment;Decreased caregiver support   PT Barriers   Functional Limitation Car transfers;Stair negotiation;Standing;Transfers; Walking   Plan   Treatment/Interventions Functional transfer training;LE strengthening/ROM; Therapeutic exercise; Endurance training;Patient/family training;Bed mobility;Gait training   Progress Slow progress, decreased activity tolerance   Recommendation   PT Discharge Recommendation   (TBD)   Equipment Recommended   (TBD)   PT Therapy Minutes   PT Time In 1030   PT Time Out 1130   PT Total Time (minutes) 60   PT Mode of treatment - Individual (minutes) 60   PT Mode of treatment - Concurrent (minutes) 0   PT Mode of treatment - Group (minutes) 0   PT Mode of treatment - Co-treat (minutes) 0   PT Mode of Treatment - Total time(minutes) 60 minutes   PT Cumulative Minutes 385   Therapy Time missed   Time missed?  No

## 2023-03-06 NOTE — ASSESSMENT & PLAN NOTE
UA last week with culture growing greater than 100,000 CFU Morganella and Serratia  Started on Ciprofloxacin but transitioned to Cefpodixime per prior providers with plan to continue into 3/10   Dysuria resolved

## 2023-03-06 NOTE — PCC PHYSICAL THERAPY
Pt is demonstrating improving indep with w/c propulsion however still presents with inc risk for falls and inc caregiver burden with overall functional mobilities when using a RW due to L hemiparesis with impaired proprioception, dec dynamic balance and righting reactions, dec act tolerance and L side inattention and dec cognition  Although pt is also able to mobilize household distances without AD but tolerance limited by back pain and safety limited by aforementioned above deficits  Pt is now off to 1:1 with improving alertness and appropriateness when calling for staff assistance utilizing call bell  Pt will benefit from additional skilled PT intervention to facilitate motor and functional gains s/p CVA  Recommend SNF placement at this time to continue rehab and functional recovery

## 2023-03-06 NOTE — PROGRESS NOTES
PM&R PROGRESS NOTE:  Rocky Veloz 75 y o  female MRN: [id number]  Unit/Bed#: -01 Encounter: [id number]      Rehabilitation Diagnosis: Impairment of mobility, safety, Activities of Daily Living (ADLs), and cognitive/communication skills due to Stroke:  01 1  Left Body Involvement (Right Brain)    HPI: Rocky Veloz is a 75 y o  female with history of asthma, anxiety, bipolar depression, chronic narcotic use, previous enteroviral infection of the heart, seronegative arthropathy of multiple joints, undifferentiated connective tissue disorder, obstructive sleep apnea on home CPAP who presented to the [facility] on 2/22 for altered mental status  According to the patient's daughter on chart review, the patient's mental status had been declining for the prior 48 hours to admission  She did receive Narcan which improved her mental status slightly  Of note she had a recent stroke with revascularization on 2/12/2023 with Dr Stefany Shah  She was seen by neurology with overall good strength except mildly weak in the lower extremities with brisk reflexes on the left and a positive Haroon's  At this time is felt to be potentially related to polypharmacy in the setting of chronic pain meds versus seizure in the setting of a recent stroke  She was initiated on 81 mg of aspirin daily with a plan of potentially transitioning to Eliquis, plan for EEG  CT of the head was negative  MRI was completed showing a new small acute/recent interval lacunar infarction in the right cerebral pedun there is jacinta  She was started on Eliquis  Nephrology was consulted as well for hypercalcemia  The patient was evaluated by the Rehabilitation team and deemed an appropriate candidate for comprehensive inpatient rehabilitation and admitted to the [facility] on 2/28/2023  3:41 PM patient was seen in room with brother at bedside and all questions regarding the acute inpatient rehabilitation program were answered  We also discussed at length course in the hospital and her last hospitalization to verify events and clarify any imaging or laboratory findings  Patient is eager to start her therapy program       SUBJECTIVE: Patient seen and examined at bedside  No acute events overnight  Started on Cipro for positive culture growth after a urinalysis was completed at the end of last week  Greater than 100,000 CFU of Morganella and Serratia, placed on Cipro for 5 days total   Discussed with ID pharmacy and recommended change to cefpodoxime 200 mg twice daily for 4 days for the duration of the treatment  continues to have episodes of confusion but easily broken and will be completely oriented x4 at times  Hypercalcemia on labs today with a calcium of 11 3 corrected up to 12 0  Based on previous discussions with nephrology on admission to the [facility] will place consult for recommendations and management  Discussed and morning stand up and plans to move her to closer to the nurses station as she was found getting up and walking to the bathroom  ASSESSMENT: Stable, progressing      PLAN:    Rehabilitation  • Functional deficits:  Self care and mobility  • Continue current rehabilitation plan of care to maximize function  • Functional update:   o PT: Bed mobility min to moderate assistance, total assistance for transfers  o OT: Total assist for oral hygiene, set up for eating, total assist for bathing, mod to max assist for dressing, total assist for footwear  o SLP: Moderately impaired at the word/phrase/sentence/conversational level  • Estimated Discharge:  To be determined in team conference    DVT prophylaxis  • On Eliquis     Pain  • Cymbalta 60 mg twice daily  • Gabapentin 200 mg twice daily  • Morphine MS Contin 15 mg twice daily  • Acetaminophen 650 mg every 6 hours as needed     Bladder plan  • Continent     Bowel plan  • Continent  • Last bowel movement 3/5/2023     Code Status  • Level 1 full code      * Acute CVA (cerebrovascular accident) [facility])  Assessment & Plan  Patient presents with altered mental status found to have acute stroke in the right cerebral peduncle  Currently with left hemiparesis and dysarthria likely compounded from prior stroke as well as new stroke  Currently on a dysphagia 1 diet with thin liquids  Review cognitive testing with OT and speech  Secondary stroke prophylaxis at this time with Eliquis 5 mg twice daily and discontinuation of aspirin  PT, OT, SLP    Consideration for neuropsychology if patient agreeable  Review modifiable risk factors and provide stroke education  Monitor for poststroke pain  Close neuro follow-up as patient has had multiple strokes in the last 2 months    History of stroke  Assessment & Plan  Patient with recent admission in early 2023 for right MCA CVA with petechial hemorrhage and residual left-sided weakness and dysarthria  Patient had mechanical thrombectomy with Dr Jordi Juárez on 2/12    Obstructive sleep apnea  Assessment & Plan  Continue CPAP use nightly  Setting of FiO2 to 30%  Okay to use oxygen as she is refusing CPAP use    Bacteremia  Assessment & Plan  Unclear etiology initially of whether her altered mental status was related to stroke versus polypharmacy versus opioid overdose versus infection versus seizure  1/2 micrococcus with a second set staph coag negative and followed by infectious disease after course of antibiotic treatment  Has been off of antibiotics and repeat blood cultures negative to date, ID felt may be contaminant    Discoloration of skin of foot  Assessment & Plan  Per daughter and patient patient's foot will become blue or red and change colors frequently  No evidence for infectious etiology and had a lower extremity arterial duplex with no evidence of any significant arterial occlusive disease  May be related to the seronegative spondyloarthropathies  Likely the source of of the inaccurate and inconsistent pulse oximetry readings in the hands as well    Hypercalcemia  Assessment & Plan  Intermittent elevations of calcium level  Nephrology has been following, nonsuppressed PTH with concerns for primary hyperparathyroidism  Endocrine consult also initiated status post SPEP with no monoclonal bands and normal vitamin D levels  Status post a CT of the chest abdomen and pelvis with no overt malignancy  Per nephrology consider Sensipar if calcium rises above 11 and avoid calcium supplementation  Endocrine outpatient follow-up    HTN (hypertension)  Assessment & Plan  Currently on metoprolol succinate 25 mg twice daily  Plan per cardiology did not add an ARB in the setting of heart failure however as blood pressure allows    Bipolar depression ([facility] )  Assessment & Plan  Currently on Wellbutrin 100 mg twice daily, Lamictal 150 mg nightly, and Cymbalta 60 mg twice daily  Consistent with home regimen  Continue monitoring for stability, mood lability    Consider neuropsychology consultation if patient agreeable    Osteoporosis  Assessment & Plan  History of an L1 compression fracture status post kyphoplasty  Was previously on Prolia has not been on since 2020  Outpatient referral to endocrine    CHF (congestive heart failure) ([facility])  Assessment & Plan  Wt Readings from Last 3 Encounters:   03/01/23 57 6 kg (126 lb 15 8 oz)   02/16/23 62 6 kg (138 lb)   02/12/23 67 9 kg (149 lb 11 1 oz)       Cardiomyopathy with reduced ejection fraction as low as 10 to 15% back in 2004 but has improved since and most recently was 25 to 30% on last admission  Patient was offered LifeVest but declined  On metoprolol succinate 25 mg twice daily, with plan to add an ARB as her blood pressure would allow  Strict I's and O's and daily weights  Follows with Dr Tahir Yeh at [facility]      Seronegative arthropathy of multiple sites [facility])  [address] with rheumatology and is on Plaquenil 200 mg Monday through Friday and 400 mg on Saturday Sunday  Continue home regimen and "follow-up with rheumatology on discharge    Asthma  Assessment & Plan  Continue use of albuterol inhaler 2 puffs every 6 hours as needed    Chronic pain syndrome  Assessment & Plan  History of chronic pain secondary to compression fractures in her thoracic spine and chronic rib fractures  · MS Contin 15 mg every 12 hours  · Gabapentin 300 mg twice daily  Was initially increased but cutting back due to sedation  · Cymbalta 60 milligrams twice daily  · Acetaminophen 650 mg every 6 hours as needed  · Was previously on oxycodone as needed at [facility] but per family due to concerns for polypharmacy and additional opioids beyond her morphine has not been on an acute care since 2/16  · Added Lidoderm and Bengay overnight by on-call physician  Daisy KHAN also ordered with instructions not to place over same area  Modified gabapentin from 300 mg twice daily to 200 mg twice daily with a 300 mg nightly dose for overnight neuropathic pain in the left foot on the hemiparetic side  Appreciate IM consultants medical co-management  Labs, medications, and imaging personally reviewed  ROS:  A ten point review of systems was completed on 03/06/23 and pertinent positives are listed in subjective section  All other systems reviewed were negative  OBJECTIVE:   /70 (BP Location: Left arm)   Pulse 105   Temp 98 2 °F (36 8 °C) (Axillary)   Resp 18   Ht 5' 3\" (1 6 m)   Wt 57 6 kg (126 lb 15 8 oz)   SpO2 91%   BMI 22 49 kg/m²     Physical Exam  Vitals and nursing note reviewed  Constitutional:       General: She is not in acute distress  Comments: Hyperarousal however awake and able to answer all questions appropriately   HENT:      Head: Atraumatic  Comments: Left-sided facial droop     Right Ear: External ear normal       Left Ear: External ear normal       Nose: Nose normal  No rhinorrhea  Mouth/Throat:      Mouth: Mucous membranes are moist       Pharynx: Oropharynx is clear   " Eyes:      General: No scleral icterus  Cardiovascular:      Rate and Rhythm: Normal rate  Pulses: Normal pulses  Heart sounds: Normal heart sounds  Pulmonary:      Effort: Pulmonary effort is normal  No respiratory distress  Breath sounds: Normal breath sounds  Abdominal:      General: There is no distension  Palpations: Abdomen is soft  Musculoskeletal:      Right lower leg: No edema  Left lower leg: No edema  Skin:     General: Skin is warm and dry  Neurological:      Mental Status: She is oriented to person, place, and time  Comments: Left-sided hemiparesis and dysmetria    Dysarthria   Psychiatric:         Mood and Affect: Mood normal          Behavior: Behavior normal           Lab Results   Component Value Date    WBC 9 83 03/02/2023    HGB 13 5 03/02/2023    HCT 41 3 03/02/2023    MCV 99 (H) 03/02/2023     03/02/2023     Lab Results   Component Value Date    SODIUM 138 03/02/2023    K 4 2 03/02/2023     03/02/2023    CO2 31 03/02/2023    BUN 14 03/02/2023    CREATININE 0 91 03/02/2023    GLUC 107 03/02/2023    CALCIUM 10 6 (H) 03/02/2023     Lab Results   Component Value Date    INR 0 95 02/22/2023    INR 0 90 02/12/2023    INR 1 12 04/22/2022    PROTIME 12 8 02/22/2023    PROTIME 12 1 02/12/2023    PROTIME 14 0 04/22/2022           Current Facility-Administered Medications:   •  acetaminophen (TYLENOL) tablet 650 mg, 650 mg, Oral, Q6H PRN, Meera Munoz DO, 650 mg at 03/05/23 1525  •  albuterol (PROVENTIL HFA,VENTOLIN HFA) inhaler 2 puff, 2 puff, Inhalation, Q6H PRN, Meera Munoz, DO  •  aluminum-magnesium hydroxide-simethicone (MYLANTA) oral suspension 30 mL, 30 mL, Oral, Q4H PRN, Meera Munoz DO, 30 mL at 03/05/23 1121  •  apixaban (ELIQUIS) tablet 5 mg, 5 mg, Oral, BID, Meera Munoz DO, 5 mg at 03/06/23 8948  •  atorvastatin (LIPITOR) tablet 40 mg, 40 mg, Oral, QPM, Meera Munoz DO, 40 mg at 03/05/23 1725  •  buPROPion [facility]) tablet 100 mg, 100 mg, Oral, BID, Peg Likens, DO, 100 mg at 03/06/23 8940  •  ciprofloxacin (CIPRO) IVPB (premix in 5% dextrose) 400 mg 200 mL, 400 mg, Intravenous, Q12H, LAURA Rutledge, Last Rate: 200 mL/hr at 03/06/23 0744, 400 mg at 03/06/23 0744  •  DULoxetine (CYMBALTA) delayed release capsule 60 mg, 60 mg, Oral, BID, Peg Likens, DO, 60 mg at 03/06/23 9546  •  gabapentin (NEURONTIN) capsule 200 mg, 200 mg, Oral, BID, Peg Likens, DO, 200 mg at 03/06/23 0916  •  glycerin-hypromellose- (ARTIFICIAL TEARS) ophthalmic solution 1 drop, 1 drop, Both Eyes, Q4H PRN, Peg Likens, DO  •  hydroxychloroquine (PLAQUENIL) tablet 200 mg, 200 mg, Oral, Once per day on Mon Tue Wed Thu Fri, Carloz Anderson Maker, DO, 200 mg at 03/06/23 5118  •  hydroxychloroquine (PLAQUENIL) tablet 400 mg, 400 mg, Oral, Once per day on Sun Sat, Peg Likens, DO, 400 mg at 03/05/23 1131  •  lamoTRIgine (LaMICtal) tablet 150 mg, 150 mg, Oral, HS, Peg Likens, DO, 150 mg at 03/05/23 2153  •  lidocaine (LIDODERM) 5 % patch 1 patch, 1 patch, Topical, Daily, Guillermo Smiley MD, 1 patch at 03/06/23 4443  •  melatonin tablet 3 mg, 3 mg, Oral, HS, LAURA Rutledge, 3 mg at 03/05/23 2154  •  menthol-methyl salicylate (BENGAY) 52-49 % cream, , Apply externally, 4x Daily PRN, Guillermo Smiley MD, Given at 03/02/23 1549  •  metoprolol succinate (TOPROL-XL) 24 hr tablet 25 mg, 25 mg, Oral, BID, LAURA Alvarenga, 25 mg at 03/06/23 1047  •  morphine (MS CONTIN) ER tablet 15 mg, 15 mg, Oral, BID, Peg Likens, DO, 15 mg at 03/06/23 8222  •  ondansetron (ZOFRAN-ODT) dispersible tablet 4 mg, 4 mg, Oral, Q6H PRN, Peg Likens, DO, 4 mg at 03/04/23 1136  •  polyethylene glycol (MIRALAX) packet 17 g, 17 g, Oral, Daily, Peg Likens, DO, 17 g at 03/06/23 9308  •  polyethylene glycol (MIRALAX) packet 17 g, 17 g, Oral, Daily PRN, Peg Likens, DO, 17 g at 03/04/23 1930  •  senna-docusate sodium (SENOKOT S) 8 6-50 mg per tablet 1 tablet, 1 tablet, Oral, BID, Clara Serna DO, 1 tablet at 03/06/23 7347    Past Medical History:   Diagnosis Date   • Anxiety    • Asthma    • Bipolar depression ([facility] )    • Chronic narcotic dependence ([facility] )    • Chronic pain    • Depression    • Enterovirus heart infection    • SHAR (obstructive sleep apnea)    • Osteoarthritis    • Peripheral neuropathy    • Plantar fasciitis of right foot    • Senile osteoporosis    • Seronegative arthropathy of multiple sites [facility])    • Undifferentiated connective tissue disease ([place] [address] )        Patient Active Problem List    Diagnosis Date Noted   • Acute [facility]) ([facility] ) 02/23/2023   • Obstructive sleep apnea 02/28/2023   • History of stroke 02/28/2023   • Bacteremia 02/24/2023   • Hypercalcemia 02/22/2023   • Discoloration of skin of foot 02/22/2023   • Abdominal pain 02/22/2023   • HFrEF (heart failure with reduced ejection fraction) ([facility] ) 02/13/2023   • Stroke ([place] [address] ) 02/12/2023   • HTN (hypertension) 02/12/2023   • Cardiomyopathy ([facility] ) 02/07/2023   • Vitamin D insufficiency 02/07/2023   • LAILA (acute kidney injury) ([facility] ) 04/23/2022   • Polypharmacy 04/23/2022   • 'light-for-dates' infant with signs of fetal malnutrition 04/22/2022   • Bipolar depression ([facility] ) 03/04/2022   • Rheumatoid arthritis ([facility] ) 03/04/2022   • Closed fracture of multiple ribs of right side 03/02/2022   • Traumatic pneumothorax 03/02/2022   • Closed fracture of transverse process of lumbar vertebra ([facility] ) 03/02/2022   • Liver contusion 03/02/2022   • Fall 03/02/2022   • Acute pain due to trauma 03/02/2022   • Right shoulder pain 03/02/2022   • Severe protein-calorie malnutrition ([facility] ) 03/02/2022   • Undifferentiated connective tissue disease ([facility] ) 12/29/2021   • Primary generalized (osteo)arthritis 12/29/2021   • Peripheral neuropathy 12/29/2021   • Osteoporosis 12/29/2021   • Hx of compression fracture of spine 12/29/2021   • Lumbar spondylosis 12/29/2021   • Cervical spondylosis 12/29/2021   • Anemia 06/26/2021   • Chronic pain syndrome    • Asthma    • Ambulatory dysfunction    • Seronegative arthropathy of multiple sites (HCC)    • Gastroesophageal reflux disease without esophagitis    • Mixed stress and urge urinary incontinence    • CHF (congestive heart failure) ([facility] )           David Blanco DO  Physical Medicine and Priyanka Rice    I have spent a total time of 40 minutes on 03/06/23 in caring for this patient including Patient and family education, Importance of tx compliance, Reviewing / ordering tests, medicine, procedures   and Communicating with other healthcare professionals   Patient with hypercalcemia requiring consultation for the nephrology service  I did reach out to them to discuss the case briefly  Additionally has discussed this with the patient and the plan to potentially start a new medication to address the hypercalcemia

## 2023-03-06 NOTE — PROGRESS NOTES
"   03/06/23 0835   Pain Assessment   Pain Assessment Tool 0-10   Pain Score 6   Pain Location/Orientation Orientation: Lower; Location: Back   Restrictions/Precautions   Precautions Aspiration;Bed/chair alarms;Cognitive; Fall Risk;Contact/isolation;Pain;Supervision on toilet/commode;Visual deficit  (dysarthria, L-inattention)   Weight Bearing Restrictions No   ROM Restrictions No   Lifestyle   Autonomy \"I had a bad dream this morning, I thought I was at my friend's house  \"   Oral Hygiene   Comment unable to complete 2* fatigue and time constraints, spoke to PCA who will A pt w/ task later today  Shower/Bathe Self   Type of Assistance Needed Physical assistance;Verbal cues; Set-up / clean-up   Physical Assistance Level 51%-75%   Comment while seated pt able to bathe ABD and partially bathe BUE, req A for thoroughness  Req extended time 2* multimodal cues to inc L-attention and natural use of LUE  Pt able to bathe upper legs, A to bathe distal legs  Mod A to steady in stance to bathe santiago and buttocks, req A to thoroughly bathe buttocks  Shower/Bathe Self CARE Score 2   Tub/Shower Transfer   Reason Not Assessed Sponge Bath;Safety   Upper Body Dressing   Type of Assistance Needed Physical assistance;Verbal cues; Set-up / clean-up   Physical Assistance Level 76% or more   Comment req cues for hernandez-dressing  Pt requesting to don bra, Max A req  Req extended time to thread LUE req Mod-Max multimodal cues, req A to fully thread  A to fully pull OH, A to fully thread RUE to protect IV site  A to fully pull down back  Upper Body Dressing CARE Score 2   Lower Body Dressing   Type of Assistance Needed Physical assistance;Verbal cues; Set-up / clean-up   Physical Assistance Level 76% or more   Comment Pt able to partially thread RLE while seated, A to thread LLE  Mod A to steady in stance for clothing management w/ pt able to partially manage over R hip w/ Mod vc's, A to fully manage over L and in back     Lower Body Dressing " CARE Score 2   Putting On/Taking Off Footwear   Type of Assistance Needed Physical assistance   Physical Assistance Level Total assistance   Comment TA to don/doff socks  Attempted to don shoes, however pt reporting R toe discomfort, would anticipate shoes tighter-fitting 2* heel allevyns  Putting On/Taking Off Footwear CARE Score 1   Sit to Lying   Type of Assistance Needed Physical assistance;Verbal cues; Adaptive equipment   Physical Assistance Level 26%-50%   Comment partial trunk management, A w/ LLE  Sit to Lying CARE Score 3   Sit to Stand   Type of Assistance Needed Physical assistance;Verbal cues   Physical Assistance Level 26%-50%   Comment vc's for proper hand placement   Sit to Stand CARE Score 3   Bed-Chair Transfer   Type of Assistance Needed Physical assistance;Verbal cues; Adaptive equipment   Physical Assistance Level 26%-50%   Comment ambulated <>bathroom w/ HHA, Mod-Max vc's for visual scanning to L to navigate environmental barriers  Chair/Bed-to-Chair Transfer CARE Score 3   Toileting Hygiene   Type of Assistance Needed Physical assistance; Adaptive equipment;Verbal cues   Physical Assistance Level 51%-75%   Comment Mod A to steady w/ pt able to manage clothing down  w/ set-up provided pt managed santiago hygiene while seated  Mod A to steady in stance for clothing management, however then noted w/ L retrolean and req vc's for BUE support of grab bar, A for clothing management  Toileting Hygiene CARE Score 2   Toilet Transfer   Type of Assistance Needed Physical assistance;Verbal cues; Adaptive equipment   Physical Assistance Level 51%-75%   Comment standard toilet w/ L grab bar, pt attempting to prematurely sit req A to guide hips  Mod-Max multimodal cues to inc safety  Toilet Transfer CARE Score 2   Cognition   Overall Cognitive Status Impaired   Arousal/Participation Alert; Cooperative   Attention Difficulty attending to directions   Orientation Level Oriented to person;Oriented to time;Oriented to situation;Disoriented to place   Memory Decreased short term memory;Decreased recall of precautions   Following Commands Follows one step commands with increased time or repetition   Assessment   Treatment Assessment Pt seen for skilled OT session focusing on self-care management  Details on ADL noted above, limited by fatigue and lower back pain req inc time for all fxnl tasks  Add'lly limited by L-hemiparesis and L-inattention req Mod-Max multimodal cues to inc safety, especially when in fxnl stance and during fxnl mobility  RN present to disconnect IV, pt reporting inc nausea and requesting medication  Cont OT POC: endurance work, short distance fxnl mobility, LUE NMR, fxnl standing tolerance, fxnl cognitive retraining/repetitive safety training, and ADL retraining  Pt requesting to return to bed at end of session 2* fatigue and lower back pain, positioned w/ wedges and pillows; all needs within reach and alarm activated  Prognosis Fair   Problem List Decreased strength;Decreased endurance; Impaired balance;Decreased mobility; Decreased coordination; Impaired judgement;Decreased cognition;Decreased safety awareness   Plan   Treatment/Interventions ADL retraining;Functional transfer training; Therapeutic exercise; Endurance training;Cognitive reorientation;Patient/family training   Progress Slow progress, decreased activity tolerance   Recommendation   OT Discharge Recommendation   (pending progress)   OT Therapy Minutes   OT Time In 0835   OT Time Out 1000   OT Total Time (minutes) 85   OT Mode of treatment - Individual (minutes) 85   OT Mode of treatment - Concurrent (minutes) 0   OT Mode of treatment - Group (minutes) 0   OT Mode of treatment - Co-treat (minutes) 0   OT Mode of Treatment - Total time(minutes) 85 minutes   OT Cumulative Minutes 520   Therapy Time missed   Time missed?  No

## 2023-03-07 ENCOUNTER — APPOINTMENT (OUTPATIENT)
Dept: RADIOLOGY | Facility: HOSPITAL | Age: 68
End: 2023-03-07

## 2023-03-07 ENCOUNTER — APPOINTMENT (INPATIENT)
Dept: RADIOLOGY | Facility: HOSPITAL | Age: 68
End: 2023-03-07

## 2023-03-07 LAB
ALBUMIN SERPL BCP-MCNC: 2.8 G/DL (ref 3.5–5)
ALBUMIN SERPL BCP-MCNC: 3.6 G/DL (ref 3.5–5)
ALP SERPL-CCNC: 69 U/L (ref 46–116)
ALT SERPL W P-5'-P-CCNC: 18 U/L (ref 12–78)
AMMONIA PLAS-SCNC: 14 UMOL/L (ref 11–35)
ANION GAP SERPL CALCULATED.3IONS-SCNC: 1 MMOL/L (ref 4–13)
AST SERPL W P-5'-P-CCNC: 21 U/L (ref 5–45)
BASOPHILS # BLD AUTO: 0.03 THOUSANDS/ÂΜL (ref 0–0.1)
BASOPHILS NFR BLD AUTO: 0 % (ref 0–1)
BILIRUB SERPL-MCNC: 0.76 MG/DL (ref 0.2–1)
BUN SERPL-MCNC: 18 MG/DL (ref 5–25)
CALCIUM SERPL-MCNC: 10.7 MG/DL (ref 8.3–10.1)
CALCIUM SERPL-MCNC: 12 MG/DL (ref 8.3–10.1)
CHLORIDE SERPL-SCNC: 104 MMOL/L (ref 96–108)
CO2 SERPL-SCNC: 32 MMOL/L (ref 21–32)
CREAT SERPL-MCNC: 1.19 MG/DL (ref 0.6–1.3)
EOSINOPHIL # BLD AUTO: 0.03 THOUSAND/ÂΜL (ref 0–0.61)
EOSINOPHIL NFR BLD AUTO: 0 % (ref 0–6)
ERYTHROCYTE [DISTWIDTH] IN BLOOD BY AUTOMATED COUNT: 14.7 % (ref 11.6–15.1)
GFR SERPL CREATININE-BSD FRML MDRD: 47 ML/MIN/1.73SQ M
GLUCOSE SERPL-MCNC: 127 MG/DL (ref 65–140)
GLUCOSE SERPL-MCNC: 153 MG/DL (ref 65–140)
HCT VFR BLD AUTO: 46.1 % (ref 34.8–46.1)
HGB BLD-MCNC: 14.7 G/DL (ref 11.5–15.4)
IMM GRANULOCYTES # BLD AUTO: 0.03 THOUSAND/UL (ref 0–0.2)
IMM GRANULOCYTES NFR BLD AUTO: 0 % (ref 0–2)
LYMPHOCYTES # BLD AUTO: 0.57 THOUSANDS/ÂΜL (ref 0.6–4.47)
LYMPHOCYTES NFR BLD AUTO: 6 % (ref 14–44)
MCH RBC QN AUTO: 31.3 PG (ref 26.8–34.3)
MCHC RBC AUTO-ENTMCNC: 31.9 G/DL (ref 31.4–37.4)
MCV RBC AUTO: 98 FL (ref 82–98)
MONOCYTES # BLD AUTO: 0.43 THOUSAND/ÂΜL (ref 0.17–1.22)
MONOCYTES NFR BLD AUTO: 5 % (ref 4–12)
NEUTROPHILS # BLD AUTO: 8.26 THOUSANDS/ÂΜL (ref 1.85–7.62)
NEUTS SEG NFR BLD AUTO: 89 % (ref 43–75)
NRBC BLD AUTO-RTO: 0 /100 WBCS
PHOSPHATE SERPL-MCNC: 2.5 MG/DL (ref 2.3–4.1)
PLATELET # BLD AUTO: 264 THOUSANDS/UL (ref 149–390)
PMV BLD AUTO: 10.3 FL (ref 8.9–12.7)
POTASSIUM SERPL-SCNC: 3.9 MMOL/L (ref 3.5–5.3)
PROT SERPL-MCNC: 7.3 G/DL (ref 6.4–8.4)
RBC # BLD AUTO: 4.69 MILLION/UL (ref 3.81–5.12)
SODIUM SERPL-SCNC: 137 MMOL/L (ref 135–147)
URATE SERPL-MCNC: 5.3 MG/DL (ref 2–7.5)
WBC # BLD AUTO: 9.35 THOUSAND/UL (ref 4.31–10.16)

## 2023-03-07 RX ORDER — PREDNISONE 20 MG/1
40 TABLET ORAL DAILY
Status: COMPLETED | OUTPATIENT
Start: 2023-03-07 | End: 2023-03-09

## 2023-03-07 RX ORDER — PREDNISONE 20 MG/1
20 TABLET ORAL DAILY
Status: COMPLETED | OUTPATIENT
Start: 2023-03-10 | End: 2023-03-12

## 2023-03-07 RX ORDER — GABAPENTIN 100 MG/1
100 CAPSULE ORAL 2 TIMES DAILY
Status: DISCONTINUED | OUTPATIENT
Start: 2023-03-07 | End: 2023-03-25 | Stop reason: HOSPADM

## 2023-03-07 RX ORDER — ECHINACEA PURPUREA EXTRACT 125 MG
1 TABLET ORAL
Status: DISCONTINUED | OUTPATIENT
Start: 2023-03-07 | End: 2023-03-25 | Stop reason: HOSPADM

## 2023-03-07 RX ORDER — MORPHINE SULFATE 15 MG/1
7.5 TABLET ORAL EVERY 6 HOURS PRN
Status: DISCONTINUED | OUTPATIENT
Start: 2023-03-07 | End: 2023-03-25 | Stop reason: HOSPADM

## 2023-03-07 RX ADMIN — MORPHINE SULFATE 15 MG: 15 TABLET, FILM COATED, EXTENDED RELEASE ORAL at 08:01

## 2023-03-07 RX ADMIN — APIXABAN 5 MG: 5 TABLET, FILM COATED ORAL at 17:12

## 2023-03-07 RX ADMIN — DULOXETINE HYDROCHLORIDE 60 MG: 60 CAPSULE, DELAYED RELEASE ORAL at 21:31

## 2023-03-07 RX ADMIN — GABAPENTIN 100 MG: 100 CAPSULE ORAL at 17:12

## 2023-03-07 RX ADMIN — GABAPENTIN 200 MG: 100 CAPSULE ORAL at 08:02

## 2023-03-07 RX ADMIN — DULOXETINE HYDROCHLORIDE 60 MG: 60 CAPSULE, DELAYED RELEASE ORAL at 08:01

## 2023-03-07 RX ADMIN — SENNOSIDES AND DOCUSATE SODIUM 1 TABLET: 8.6; 5 TABLET ORAL at 08:02

## 2023-03-07 RX ADMIN — ATORVASTATIN CALCIUM 40 MG: 40 TABLET, FILM COATED ORAL at 17:12

## 2023-03-07 RX ADMIN — HYDROXYCHLOROQUINE SULFATE 200 MG: 200 TABLET ORAL at 07:35

## 2023-03-07 RX ADMIN — PREDNISONE 40 MG: 20 TABLET ORAL at 11:39

## 2023-03-07 RX ADMIN — BUPROPION HYDROCHLORIDE TABLETS 100 MG: 100 TABLET, FILM COATED ORAL at 17:11

## 2023-03-07 RX ADMIN — MELATONIN 3 MG: at 21:31

## 2023-03-07 RX ADMIN — BUPROPION HYDROCHLORIDE TABLETS 100 MG: 100 TABLET, FILM COATED ORAL at 08:02

## 2023-03-07 RX ADMIN — SENNOSIDES AND DOCUSATE SODIUM 1 TABLET: 8.6; 5 TABLET ORAL at 17:12

## 2023-03-07 RX ADMIN — CEFPODOXIME PROXETIL 200 MG: 200 TABLET, FILM COATED ORAL at 07:35

## 2023-03-07 RX ADMIN — LAMOTRIGINE 150 MG: 100 TABLET ORAL at 21:31

## 2023-03-07 RX ADMIN — ALBUTEROL SULFATE 2 PUFF: 90 AEROSOL, METERED RESPIRATORY (INHALATION) at 13:58

## 2023-03-07 RX ADMIN — METOPROLOL SUCCINATE 25 MG: 25 TABLET, EXTENDED RELEASE ORAL at 08:01

## 2023-03-07 RX ADMIN — CEFPODOXIME PROXETIL 200 MG: 200 TABLET, FILM COATED ORAL at 17:11

## 2023-03-07 RX ADMIN — APIXABAN 5 MG: 5 TABLET, FILM COATED ORAL at 08:01

## 2023-03-07 RX ADMIN — LIDOCAINE 5% 1 PATCH: 700 PATCH TOPICAL at 08:02

## 2023-03-07 NOTE — TEAM CONFERENCE
[facility]  Date: 3/7/2023   Time: 10:48 AM       Patient Name:  Margareth Olivares       Medical Record Number: [id number]   YOB: 1955  Sex: Female          Room/Bed:  [facility] 453-01  Payor Info:  Payor: MEDICARE / Plan: MEDICARE A AND B / Product Type: Medicare A & B Fee for Service /      Admitting Diagnosis: Acute right MCA stroke ([facility] ) [I63 511]  CVA (cerebral vascular accident) ([place] [address] ) [I63 9]   Admit Date/Time:  2/28/2023  3:41 PM  Admission Comments: No comment available     Primary Diagnosis:  Acute CVA (cerebrovascular accident) ([place] [address] )  Principal Problem: Acute CVA (cerebrovascular accident) [facility])    Patient Active Problem List    Diagnosis Date Noted   • UTI (urinary tract infection) 03/06/2023   • Obstructive sleep apnea 02/28/2023   • History of stroke 02/28/2023   • Bacteremia 02/24/2023   • Acute CVA (cerebrovascular accident) ([place] [address] ) 02/23/2023   • Hypercalcemia 02/22/2023   • Discoloration of skin of foot 02/22/2023   • Abdominal pain 02/22/2023   • HFrEF (heart failure with reduced ejection fraction) ([facility] ) 02/13/2023   • Stroke ([facility] ) 02/12/2023   • HTN (hypertension) 02/12/2023   • Cardiomyopathy ([facility] ) 02/07/2023   • Vitamin D insufficiency 02/07/2023   • LAILA (acute kidney injury) ([place] [address] ) 04/23/2022   • Polypharmacy 04/23/2022   • 'light-for-dates' infant with signs of fetal malnutrition 04/22/2022   • Bipolar depression ([facility] ) 03/04/2022   • Rheumatoid arthritis ([facility] ) 03/04/2022   • Closed fracture of multiple ribs of right side 03/02/2022   • Traumatic pneumothorax 03/02/2022   • Closed fracture of transverse process of lumbar vertebra ([facility] ) 03/02/2022   • Liver contusion 03/02/2022   • Fall 03/02/2022   • Acute pain due to trauma 03/02/2022   • Right shoulder pain 03/02/2022   • Severe protein-calorie malnutrition ([facility] ) 03/02/2022   • Undifferentiated connective tissue disease ([facility] ) 12/29/2021   • Primary generalized (osteo)arthritis 12/29/2021   • Peripheral neuropathy 12/29/2021   • Osteoporosis 12/29/2021   • Hx of compression fracture of spine 12/29/2021   • Lumbar spondylosis 12/29/2021   • Cervical spondylosis 12/29/2021   • Anemia 06/26/2021   • Chronic pain syndrome    • Asthma    • Ambulatory dysfunction    • Seronegative arthropathy of multiple sites (HCC)    • Gastroesophageal reflux disease without esophagitis    • Mixed stress and urge urinary incontinence    • CHF (congestive heart failure) ([facility] )        Physical Therapy:    Weight Bearing Status: Full Weight Bearing  Transfers: Moderate Assistance  Bed Mobility: Moderate Assistance  Amulation Distance (ft): 70 feet  Ambulation: Moderate Assistance, Minimal Assistance  Assistive Device for Ambulation: Hand Hold Assistance  Wheelchair Mobility: Moderate Assistance  Assistive Device for Stairs: Bilateral Hand Rails  Stair Assistance: Moderate Assistance  Discharge Recommendations:  (pending progress)    3/6/23Pt cont to be limited by poor attention, decreased alertness, decreased caregiver support, decreased insight into current deficits, decreased safety and poor righting reactions  Pt is amb up to 70 feet HHA on R with MIN/MODA  Pt able to amb up/down 6 steps with BHR's with retro descend, MODA and MAX VC's  Pt performed up to 100' WC mobility with MIN/MODA and MOD VC's  Pt will cont POC as tolerated with focus on improved gait, increased balance, increased stair management, increased righting reactions and safety awareness  Occupational Therapy:  Eating: Minimal Assistance  Grooming: Minimal Assistance  Bathing: Maximum Assistance  Bathing: Maximum Assistance  Upper Body Dressing: Maximum Assistance  Lower Body Dressing: Maximum Assistance  Toileting: Moderate Assistance  Toilet Transfer:  Moderate Assistance  Cognition: Exceptions to WNL  Cognition: Decreased Memory, Decreased Executive Functions, Impulsive, Decreased Attention, Decreased Safety, Decreased Comprehension  Orientation: Person, Time, Situation  Discharge Recommendations: Home with:  [address] with[de-identified] 24 Hour Supervision, 24 Hour Assistance, Family Support, First Floor Setup       Pt continues to present with impairments in activity tolerance, endurance, standing balance/tolerance, sitting balance/tolerance, UE strength, FMC, GMC, arousal, memory, insight, safety , judgement , attention , sequencing , visual perceptual skills , depth perception , and (L) attention   Additional functional barriers include fatigue, pain, impulsivity, (L) hemiparesis, (L) visual deficits , decreased caregiver support, risk for falls, and home environment  Pt is functioning at overall Mod-Max A for ADLs and Mod Ax1 HHA for fxnl mobility  Pt will continue to benefit from skilled OT services to address above mentioned barriers and maximize functional independence in baseline areas of occupation  OT D/C recommendation is for reteam, highly anticipate need for 24 hour S/A at time of d/c               Speech Therapy:  Mode of Communication: Verbal  Speech/Language: Dysarthia  Cognition: Exceptions to WNL  Cognition: Decreased Memory, Decreased Executive Functions, Decreased Attention, Decreased Comprehension, Decreased Safety  Orientation: Person, Place, Time, Situation  Swallowing: Exceptions to WNL  Swallowing: Oral Dysphagia, Pharyngeal Dysphagia, Aspiration Risk, Esophageal Dysphagia  Diet Recommendations: Level 1/Puree, Thin  Discharge Recommendations:  (pending pt progress)  Pt currently being followed for speech, cognitive and dysphagia tx sessions  In regards to dysphagia, pt is demonstrating moderate oropharyngeal deficits to where current diet recommendations are for puree/thin liquids by cup only  Symptoms or concerns included decreased bolus acceptance, decreased bolus propulsion, decreased mastication, decreased bolus formation, suspected decreased control of soft solids and thin   and unintentional oral holding w/ soft solids    suspected pharyngeal swallow delay, suspected decreased hyolaryngeal elevation upon palpation, suspected pharyngeal residue, multiple swallows, audible swallows and throat clearing w/ solids, coughing w/ sips of thin and soft solids  Pt has been requesting liquids by straw, but due to decreased bolus control/timing of swallow initation and the increased aspiration sxs which are elicited given straw sips, still recommendations remain for no straws currently  It is likely that a repeat VFSS to determine current swallow function and least restrictive diet  As for speech, pt able to complete Motor Speech Eval, in which pt is demonstrating speech intelligibility to be moderately impaired at the word/phrase/sentence/conversational level  Pt noting to elicit the following errors: imprecise articulation, decreased breath support for speech, fast rate which impacts overall clarity at the word, phrase, sentence and conversational speech level  Pt w/ inconsistent awareness given this throughout assessment today  Additionally, pt has been working towards completing the CLQT+ as well targeting cognitive linguistic skills, to where pt is noted to exhibit deficits given attention, visual attention (L inattention), decreased ST/working memory, decreased executive functions (problem solving, reasoning, sequencing, organization of thoughts), decreased judgement and insight to overall deficits impacting safety and functional mobility  Currently pt will continue to benefit from ongoing skilled SLP services to maximize overall speech, cognitive and swallow function in establishing least restrictive diet in attempts to decrease caregiver burden at time of anticipated discharge home w/ family support/supervision        Nursing Notes:  Appetite: Fair  Diet Type: Dysphagia I                      Diet Patient/Family Education Complete: No (ongoing)                            Bladder: Incontinent     Bladder Patient/Family Education: Yes  Bowel: Continent     Bowel Patient/Family Education: Yes  Pain Location/Orientation: Orientation: Right, Other (Comment) (big toe on Rt foot)  Pain Score: 5  Pain 2  Pain Score 2: 2 (LBP)                    Hospital Pain Intervention(s): Medication (See MAR)  Pain Patient/Family Education: Yes  Medication Management/Safety  Safe Administration: No  Reason for non-safe administration: will need med assist at d/c  Medication Patient/Family Education Complete: No (ongoing)    Pt admitted w decline in LOC from [facility] s/p Recent right MCA infarct W/petechial hemorrhage S/P thrombectomy by Dr Edwin Ye 2/12 Continue Eliquis 5 mg twice daily/statin Follow-up with neurosurgery/neurology as scheduled NICM- EF has been as low as 10 to 15% in the past In 2022 was 65% Now down to 30% = pt declined [facility] felt this was secondary to her recent CVA as well as possible medication noncompliance They recommend continuing beta-blocker and adding ARB when BP allows  Will need outpatient ischemia work-up Have not been able to add back ARB yet 2/2 soft BPs and cant go down on Toprol dosing since HRs go into the low 100s at times Seronegative rheumatoid arthritis A/T rheumatology takes Plaquenil 200 mg Monday through Friday 400 mg on Saturday and Sunday Continue same Follow-up on discharge  Chronic pain- Due to compression fractures of her thoracic spine/chronic rib fractures Takes MS Contin 15 mg every 12 hours Has Oxycodone 5 mg immediate release for breakthrough pain at home  According to the patient she takes only 1 dose of breakthrough oxycodone per day  Here using tylenol for breakthrough  Bipolar disorder Continue current medication regimen SHAR- Refusing CPAP here  OK use oxygen overnight Tachycardia/low O2 on pulse ox- Some irregularity on exam in past but today RRR  EKG with PVCs  During the week, O2 sats improved when hands were warmed up  O2 sats not likely to be accurate due to circulation issues     Dysuria/Urinary frequency/incontinence- Urine culture +CRE PMR started IV Cipro 400mg every 12 hours for 5 days  Switched to po  Pt w fall 3/7- found on floor  1:1 watch ordered  Pt inc of bowel and bladder at times  This week we will encourage independence with ADLs  We will monitor labs and vital signs  We will educate pt about repositioning to prevent skin breakdown  We will perform routine skin checks  We will monitor for constipation and medicate as ordered  We will monitor for adequate pain control  We will increase safety awareness with transfers and keep pt free from falls  Case Management:     Discharge Planning  Living Arrangements: Lives w/ Children, Lives w/ Family members  Support Systems: Self  Assistance Needed: walker  Type of Current Residence: Private residence  Current Home Care Services: No  3/6- CM met with patient and phoned daughter Marianamelanie Menendez to obtain admission information  Patient pleasant, answered questions appropriately  Patient's daughter Jo Menendez reported patient lives with her son and brother in multi level half double home  Jo Menendez stated she is moving her mother down to the first floor  Jo Menendez stated she will be taking over primary chores and care of patient, with assistance from patient's son and brother  Patient's brother is older, may not be able to offer physical assistance  Patient was Independent PTA to stroke, on disability from broken back and multiple surgeries, for approx 10-15 years  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, and has been resistant to assistance in the past  CM made  Jo Menendez aware, therapists will be going over ADL's medication, and discussing potential needs for assistance  Patient has a walker, cane and shower chair, has STR experience at [facility]  Jo Menendez confirmed medical insurance as Medicare as primary and [employer] as secondary  Patient has prescription coverage    Jo Menendez is requesting a wheelchair for home, CM made her aware therapists will be determining needs for equipment during therapy sessions, will give order form to CM and CM will order equipment  CM made Clarence aware CM will pass her request along to the team   Clarence leaves approx 10-15 minutes away  Family will be providing transport  Patient was inpatient admission 2/12/23, went to [facility], then readmitted to inpatient 2/22 with altered mental status at [facility]  Patient's daughter and patient stated they were not happy with [facility]  Is the patient actively participating in therapies? yes  List any modifications to the treatment plan: None    Barriers Interventions   Pain Med management   Sleep apnea CPAP at night   Hypertension Med management   Gout on right big toe Med magement   Impaired cog SLP Services   Lethargy/possible UTI ABX   Left side inattention Compensatory strategies   Gait dysfunction TE   Impaired balance Strengthening    Impaired cog SLP Services   Dysarthria SLP Services   Poor standing balance Strengthening       Oral farengal dysphagia SLP Services      Is the patient making expected progress toward goals?  yes  List any update or changes to goals: None    Medical Goals: Patient will be medically stable for discharge to [facility] restrictive envrionment upon completion of rehab program and Patient will be able to manage medical conditions and comorbid conditions with medications and follow up upon completion of rehab program    Weekly Team Goals:   Rehab Team Goals  ADL Team Goal: Patient will require supervision with ADLs with least restrictive device upon completion of rehab program  Transfer Team Goal: Patient will require supervision with transfers with least restrictive device upon completion of rehab program (S)  Locomotion Team Goal: Patient will require supervision with locomotion with least restrictive device upon completion of rehab program (S)  Cognitive Team Goal: Patient will require supervision for basic and complex tasks upon completion of rehab program    Discussion: Pt participates in rehab program and participating in therapies  Pt functioning at mod assist/min assist ambulation, max assist for ADLS IADLs  Mod assist for comprehension expression, max for problem solving, supervision for swallow  Pureed diet, thin liquids  Team recommending reteam to further focus on achieving functional and mobility goals  Anticipated Discharge Date:  Anticipated dc date of Wednesday 3/22  [facility] Team Members Present: The following team members are supervising care for this patient and were present during this Weekly Team Conference      Physician: Dr Say Chacon MD  : MING Paul  Registered Nurse: Alfonso Kramer, JULISA  Physical Therapist: Malka Marquez DPT  Occupational Therapist: Lisette Longoria MS, OTR/L, CBIS  Speech Therapist: Tan Barrera [facility], [id number] [facility]

## 2023-03-07 NOTE — CONSULTS
Consultation - Tiff Myles 75 y o  female MRN: [id number]    Unit/Bed#: -01 Encounter: [id number]      Assessment/Plan     Assessment: This is a 75y o -year-old female with past medical history of osteoporosis, vertebral compression fractures, rheumatoid arthritis, thyroid nodule, CVA status post thromboembolectomy, hypertension, heart failure with reduced ejection fraction who presented to the hospital with altered mental status, found to have acute CVA  Patient was seen in the inpatient side for evaluation of hypercalcemia which was suspected to be due to to primary hyperthyroidism  Endocrinology consulted for evaluation of hypercalcemia    Plan:  Hypercalcemia  Corrected calcium 11 7  Work-up including SPEP-no monoclonal bands, UPEP- no monoclonal bands, PTH RP less than 2, 25-hydroxy vitamin D 41 7, 1,25 hydroxy vitamin D 23 7 low, Phos 2 5 low, PTH 65 4 (inappropriately normal) indicative of primary hyperparathyroidism  We will reach out to nephrology as the patient will need contrast and subsequently consider getting CT of the parathyroid while the patient is inpatient  Anticipated discharge for the patient is in 2 weeks  Ideally, patient would need to follow-up with endocrine surgeon to evaluate for possible parathyroidectomy  We will follow-up corrected calcium, Phos tomorrow and evaluate for utility of Sensipar    Osteoporosis  Would recommend outpatient follow-up with endocrinology    CC: Hypercalcemia Consult    History of Present Illness     HPI:   This is a 75y o -year-old female with past medical history of osteoporosis, vertebral compression fractures, rheumatoid arthritis, thyroid nodule, CVA status post thromboembolectomy, hypertension, heart failure with reduced ejection fraction who presented to the hospital with altered mental status, found to have acute CVA    Patient was seen in the inpatient side for evaluation of hypercalcemia which was suspected to be due to to primary hyperthyroidism  Endocrinology consulted for evaluation of hypercalcemia  Patient reports to have constipation, reports that she moves her bowels every other day  Also reports to have polydipsia, polyuria and burning on urination  Denies any irritability or mood disturbances  No new fractures reported  Patient reports to have low back pain today  Patient reported she had a fall yesterday and she was trying to get out of bed  Inpatient consult to Endocrinology  Consult performed by: Aminata Wilson MD  Consult ordered by: Niol Sharp DO          Review of Systems   Constitutional: Negative for activity change, appetite change and fatigue  HENT: Negative for congestion and sore throat  Eyes: Negative for redness and visual disturbance  Respiratory: Negative for cough and shortness of breath  Cardiovascular: Negative for palpitations and leg swelling  Gastrointestinal: Positive for constipation  Negative for abdominal pain, diarrhea, nausea and vomiting  Endocrine: Positive for polydipsia and polyuria  Negative for cold intolerance, heat intolerance and polyphagia  Genitourinary: Negative for difficulty urinating and dysuria  Musculoskeletal: Negative for gait problem and neck pain  Skin: Negative for color change  Neurological: Negative for dizziness and syncope  Psychiatric/Behavioral: Negative for agitation and behavioral problems  All other systems reviewed and are negative        Historical Information   Past Medical History:   Diagnosis Date   • Anxiety    • Asthma    • Bipolar depression ([facility] )    • [facility] dependence [facility] )    • Chronic pain    • Depression    • Enterovirus heart infection    • SHAR (obstructive sleep apnea)    • Osteoarthritis    • Peripheral neuropathy    • Plantar fasciitis of right foot    • Senile osteoporosis    • Seronegative arthropathy of multiple sites [facility])    • Undifferentiated connective tissue disease ([facility] )      Past Surgical History:   Procedure Laterality Date   • BREAST IMPLANT     • FIXATION KYPHOPLASTY LUMBAR SPINE     • HYSTERECTOMY     • INCONTINENCE SURGERY N/A    • IR STROKE ALERT  2/12/2023   • NASAL SEPTOPLASTY W/ TURBINOPLASTY N/A    • RECTAL PROLAPSE REPAIR N/A    • REPAIR RECTOCELE     • RHINOPLASTY N/A      Social History   Social History     Substance and Sexual Activity   Alcohol Use Not Currently    Comment: quit     Social History     Substance and Sexual Activity   Drug Use Yes   • Types: Marijuana    Comment: has medical card     Social History     Tobacco Use   Smoking Status Former   Smokeless Tobacco Never     Family History:   Family History   Problem Relation Age of Onset   • No Known Problems Mother    • No Known Problems Father    • Arthritis Family    • Diabetes Family    • Stroke Family        Meds/Allergies   Current Facility-Administered Medications   Medication Dose Route Frequency Provider Last Rate Last Admin   • acetaminophen (TYLENOL) tablet 650 mg  650 mg Oral Q6H PRN Carley Sanchez, DO   650 mg at 03/06/23 1227   • albuterol (PROVENTIL HFA,VENTOLIN HFA) inhaler 2 puff  2 puff Inhalation Q6H PRN Carley Sanchez DO   2 puff at 03/07/23 1358   • aluminum-magnesium hydroxide-simethicone (MYLANTA) oral suspension 30 mL  30 mL Oral Q4H PRN Carley Sanchez, DO   30 mL at 03/06/23 1828   • apixaban (ELIQUIS) tablet 5 mg  5 mg Oral BID Carley Sanchez DO   5 mg at 03/07/23 0801   • atorvastatin (LIPITOR) tablet 40 mg  40 mg Oral QPM Carley Sanchez, DO   40 mg at 03/06/23 1740   • buPROPion (WELLBUTRIN) tablet 100 mg  100 mg Oral BID Carley Sanchez, DO   100 mg at 03/07/23 0802   • cefpodoxime (VANTIN) tablet 200 mg  200 mg Oral BID With Meals Carley Sanchez DO   200 mg at 03/07/23 2881   • DULoxetine (CYMBALTA) delayed release capsule 60 mg  60 mg Oral BID Carley Sanchez DO   60 mg at 03/07/23 0801   • gabapentin (NEURONTIN) capsule 100 mg  100 mg Oral BID Ignacio Solo MD       • glycerin-hypromellose-PEG 400 (ARTIFICIAL TEARS) ophthalmic solution 1 drop  1 drop Both Eyes Q4H PRN Lisa Gerhard, DO       • hydroxychloroquine (PLAQUENIL) tablet 200 mg  200 mg Oral Once per day on Mon Tue Wed Thu Fri Lisa Gerhard, DO   200 mg at 03/07/23 4735   • hydroxychloroquine (PLAQUENIL) tablet 400 mg  400 mg Oral Once per day on Sun Sat Lisa Gerhard, DO   400 mg at 03/05/23 0030   • lamoTRIgine (LaMICtal) tablet 150 mg  150 mg Oral HS Lisa Gerhard, DO   150 mg at 03/06/23 2124   • lidocaine (LIDODERM) 5 % patch 1 patch  1 patch Topical Daily Jean Whatley MD   1 patch at 03/07/23 0802   • melatonin tablet 3 mg  3 mg Oral HS LAURA Rutledge   3 mg at 03/06/23 2124   • menthol-methyl salicylate (BENGAY) 42-48 % cream   Apply externally 4x Daily PRN Jean Whatley MD   Given at 03/06/23 1227   • metoprolol succinate (TOPROL-XL) 24 hr tablet 25 mg  25 mg Oral BID LAURA Morin   25 mg at 03/07/23 0801   • morphine (MS CONTIN) ER tablet 15 mg  15 mg Oral BID Lisa Gerhard, DO   15 mg at 03/07/23 5633   • ondansetron (ZOFRAN-ODT) dispersible tablet 4 mg  4 mg Oral Q6H PRN Lisa Gerhard, DO   4 mg at 03/04/23 1136   • polyethylene glycol (MIRALAX) packet 17 g  17 g Oral Daily Lisa Gerhard, DO   17 g at 03/06/23 3023   • polyethylene glycol (MIRALAX) packet 17 g  17 g Oral Daily PRN Lisa Gerhard, DO   17 g at 03/04/23 1930   • [START ON 3/10/2023] predniSONE tablet 20 mg  20 mg Oral Daily LAURA Morin       • predniSONE tablet 40 mg  40 mg Oral Daily MATTI MorinNP   40 mg at 03/07/23 1139   • senna-docusate sodium (SENOKOT S) 8 6-50 mg per tablet 1 tablet  1 tablet Oral BID Lisa Gerhard, DO   1 tablet at 03/07/23 0802     Allergies   Allergen Reactions   • Dye [Iodinated Contrast Media] Anaphylaxis     IVP dye   • Bactrim [Sulfamethoxazole-Trimethoprim] Hives   • Erythromycin Hives   • Penicillins        Objective   Vitals: Blood pressure 112/60, pulse 90, temperature 98 2 °F (36 8 °C), resp   rate 16, height "5' 3\" (1 6 m), weight 57 6 kg (126 lb 15 8 oz), SpO2 96 %  Intake/Output Summary (Last 24 hours) at 3/7/2023 1534  Last data filed at 3/7/2023 1238  Gross per 24 hour   Intake 380 ml   Output 575 ml   Net -195 ml     Invasive Devices     Peripheral Intravenous Line  Duration           Peripheral IV 03/05/23 Right; Lower Forearm 1 day          Drain  Duration           External Urinary Catheter 6 days                Physical Exam  Constitutional:       Appearance: Normal appearance  HENT:      Head: Normocephalic and atraumatic  Cardiovascular:      Rate and Rhythm: Normal rate and regular rhythm  Pulses: Normal pulses  Heart sounds: Normal heart sounds  No murmur heard  No gallop  Pulmonary:      Effort: Pulmonary effort is normal       Breath sounds: Normal breath sounds  No wheezing or rales  Abdominal:      General: There is no distension  Palpations: Abdomen is soft  Tenderness: There is no abdominal tenderness  Musculoskeletal:         General: No swelling  Cervical back: Normal range of motion and neck supple  No tenderness  Right lower leg: No edema  Left lower leg: No edema  Lymphadenopathy:      Cervical: No cervical adenopathy  Skin:     General: Skin is warm and dry  Neurological:      Mental Status: She is alert and oriented to person, place, and time  Psychiatric:         Mood and Affect: Mood normal          Behavior: Behavior normal          The history was obtained from the review of the chart, patient      Lab Results:       Lab Results   Component Value Date    WBC 9 35 03/07/2023    HGB 14 7 03/07/2023    HCT 46 1 03/07/2023    MCV 98 03/07/2023     03/07/2023     Lab Results   Component Value Date/Time    BUN 18 03/07/2023 02:37 PM    K 3 9 03/07/2023 02:37 PM     03/07/2023 02:37 PM    CO2 32 03/07/2023 02:37 PM    CO2 29 11/17/2016 08:15 PM    CREATININE 1 19 03/07/2023 02:37 PM    AST 21 03/07/2023 02:37 PM    ALT 18 " "03/07/2023 02:37 PM    ALB 3 6 03/07/2023 02:37 PM     No results for input(s): CHOL, HDL, LDL, TRIG, VLDL in the last 72 hours  No results found for: Ni Wright  POC Glucose (mg/dl)   Date Value   03/07/2023 127   02/12/2023 113   04/23/2022 77   03/02/2022 151 (H)   03/02/2022 38 (LL)       Imaging Studies: I have personally reviewed pertinent reports  Portions of the record may have been created with voice recognition software  Please Tigertext questions to the clinician covering the \"[id number]\" Role  Thank you       "

## 2023-03-07 NOTE — PLAN OF CARE
Problem: PAIN - ADULT  Goal: Verbalizes/displays adequate comfort level or baseline comfort level  Description: Interventions:  - Encourage patient to monitor pain and request assistance  - Assess pain using appropriate pain scale  - Administer analgesics based on type and severity of pain and evaluate response  - Implement non-pharmacological measures as appropriate and evaluate response  - Consider cultural and social influences on pain and pain management  - Notify physician/advanced practitioner if interventions unsuccessful or patient reports new pain  Outcome: Progressing     Problem: INFECTION - ADULT  Goal: Absence or prevention of progression during hospitalization  Description: INTERVENTIONS:  - Assess and monitor for signs and symptoms of infection  - Monitor lab/diagnostic results  - Monitor all insertion sites, i e  indwelling lines, tubes, and drains  - Monitor endotracheal if appropriate and nasal secretions for changes in amount and color  - [place] appropriate cooling/warming therapies per order  - Administer medications as ordered  - Instruct and encourage patient and family to use good hand hygiene technique  - Identify and instruct in appropriate isolation precautions for identified infection/condition  Outcome: Progressing  Goal: Absence of fever/infection during neutropenic period  Description: INTERVENTIONS:  - Monitor WBC    Outcome: Progressing     Problem: SAFETY ADULT  Goal: Patient will remain free of falls  Description: INTERVENTIONS:  - Educate patient/family on patient safety including physical limitations  - Instruct patient to call for assistance with activity   - Consult OT/PT to assist with strengthening/mobility   - Keep Call bell within reach  - Keep bed low and locked with side rails adjusted as appropriate  - Keep care items and personal belongings within reach  - Initiate and maintain comfort rounds  - Make Fall Risk Sign visible to staff  - Offer Toileting every 2 Hours, in advance of need  - Initiate/Maintain bed/chair alarm  - Obtain necessary fall risk management equipment: nonskid socks  - Apply yellow socks and bracelet for high fall risk patients  - Consider moving patient to room near nurses station  Outcome: Progressing  Goal: Maintain or return to baseline ADL function  Description: INTERVENTIONS:  -  Assess patient's ability to carry out ADLs; assess patient's baseline for ADL function and identify physical deficits which impact ability to perform ADLs (bathing, care of mouth/teeth, toileting, grooming, dressing, etc )  - Assess/evaluate cause of self-care deficits   - Assess range of motion  - Assess patient's mobility; develop plan if impaired  - Assess patient's need for assistive devices and provide as appropriate  - Encourage maximum independence but intervene and supervise when necessary  - Involve family in performance of ADLs  - Assess for home care needs following discharge   - Consider OT consult to assist with ADL evaluation and planning for discharge  - Provide patient education as appropriate  Outcome: Progressing  Goal: Maintains/Returns to pre admission functional level  Description: INTERVENTIONS:  - Perform BMAT or MOVE assessment daily    - Set and communicate daily mobility goal to care team and patient/family/caregiver  - Collaborate with rehabilitation services on mobility goals if consulted  - Perform Range of Motion 3 times a day  - Reposition patient every 2 hours    - Dangle patient 3 times a day  - Stand patient 3 times a day  - Ambulate patient 3 times a day  - Out of bed to chair 3 times a day   - Out of bed for meals 3 times a day  - Out of bed for toileting  - Record patient progress and toleration of activity level   Outcome: Progressing     Problem: DISCHARGE PLANNING  Goal: Discharge to home or other facility with appropriate resources  Description: INTERVENTIONS:  - Identify barriers to discharge w/patient and caregiver  - Arrange for needed discharge resources and transportation as appropriate  - Identify discharge learning needs (meds, wound care, etc )  - Arrange for interpretive services to assist at discharge as needed  - Refer to Case Management Department for coordinating discharge planning if the patient needs post-hospital services based on physician/advanced practitioner order or complex needs related to functional status, cognitive ability, or social support system  Outcome: Progressing     Problem: Nutrition/Hydration-ADULT  Goal: Nutrient/Hydration intake appropriate for improving, restoring or maintaining nutritional needs  Description: Monitor and assess patient's nutrition/hydration status for malnutrition  Collaborate with interdisciplinary team and initiate plan and interventions as ordered  Monitor patient's weight and dietary intake as ordered or per policy  Utilize nutrition screening tool and intervene as necessary  Determine patient's food preferences and provide high-protein, high-caloric foods as appropriate       INTERVENTIONS:  - Monitor oral intake, urinary output, labs, and treatment plans  - Assess nutrition and hydration status and recommend course of action  - Evaluate amount of meals eaten  - Assist patient with eating if necessary   - Allow adequate time for meals  - Recommend/ encourage appropriate diets, oral nutritional supplements, and vitamin/mineral supplements  - Order, calculate, and assess calorie counts as needed  - Recommend, monitor, and adjust tube feedings and TPN/PPN based on assessed needs  - Assess need for intravenous fluids  - Provide specific nutrition/hydration education as appropriate  - Include patient/family/caregiver in decisions related to nutrition  Outcome: Progressing     Problem: Nutrition/Hydration-ADULT  Goal: Nutrient/Hydration intake appropriate for improving, restoring or maintaining nutritional needs  Description: Monitor and assess patient's nutrition/hydration status for malnutrition  Collaborate with interdisciplinary team and initiate plan and interventions as ordered  Monitor patient's weight and dietary intake as ordered or per policy  Utilize nutrition screening tool and intervene as necessary  Determine patient's food preferences and provide high-protein, high-caloric foods as appropriate       INTERVENTIONS:  - Monitor oral intake, urinary output, labs, and treatment plans  - Assess nutrition and hydration status and recommend course of action  - Evaluate amount of meals eaten  - Assist patient with eating if necessary   - Allow adequate time for meals  - Recommend/ encourage appropriate diets, oral nutritional supplements, and vitamin/mineral supplements  - Order, calculate, and assess calorie counts as needed  - Recommend, monitor, and adjust tube feedings and TPN/PPN based on assessed needs  - Assess need for intravenous fluids  - Provide specific nutrition/hydration education as appropriate  - Include patient/family/caregiver in decisions related to nutrition  Outcome: Progressing     Problem: Prexisting or High Potential for Compromised Skin Integrity  Goal: Skin integrity is maintained or improved  Description: INTERVENTIONS:  - Identify patients at risk for skin breakdown  - Assess and monitor skin integrity  - Assess and monitor nutrition and hydration status  - Monitor labs   - Assess for incontinence   - Turn and reposition patient  - Assist with mobility/ambulation  - Relieve pressure over bony prominences  - Avoid friction and shearing  - Provide appropriate hygiene as needed including keeping skin clean and dry  - Evaluate need for skin moisturizer/barrier cream  - Collaborate with interdisciplinary team   - Patient/family teaching  - Consider wound care consult   Outcome: Progressing     Problem: MOBILITY - ADULT  Goal: Maintain or return to baseline ADL function  Description: INTERVENTIONS:  -  Assess patient's ability to carry out ADLs; assess patient's baseline for ADL function and identify physical deficits which impact ability to perform ADLs (bathing, care of mouth/teeth, toileting, grooming, dressing, etc )  - Assess/evaluate cause of self-care deficits   - Assess range of motion  - Assess patient's mobility; develop plan if impaired  - Assess patient's need for assistive devices and provide as appropriate  - Encourage maximum independence but intervene and supervise when necessary  - Involve family in performance of ADLs  - Assess for home care needs following discharge   - Consider OT consult to assist with ADL evaluation and planning for discharge  - Provide patient education as appropriate  Outcome: Progressing  Goal: Maintains/Returns to pre admission functional level  Description: INTERVENTIONS:  - Perform BMAT or MOVE assessment daily    - Set and communicate daily mobility goal to care team and patient/family/caregiver  - Collaborate with rehabilitation services on mobility goals if consulted  - Perform Range of Motion 3 times a day  - Reposition patient every 2 hours    - Dangle patient 3 times a day  - Stand patient 3 times a day  - Ambulate patient 3 times a day  - Out of bed to chair 3 times a day   - Out of bed for meals 3 times a day  - Out of bed for toileting  - Record patient progress and toleration of activity level   Outcome: Progressing

## 2023-03-07 NOTE — PROGRESS NOTES
03/07/23 0930   Pain Assessment   Pain Assessment Tool 0-10   Pain Score 7  (7/10 with pressure, 5/10 during Wbing act)   Wheeler-Bhandari FACES Pain Rating 6   Pain Location/Orientation Orientation: Right  (big toe)   Pain Onset/Description Onset: Ongoing;Frequency: Constant/Continuous   Hospital Pain Intervention(s) Rest;Emotional support  (RN, LAURA  notified)   Restrictions/Precautions   Precautions Aspiration;1:1;Cognitive; Fall Risk;Impulsive;Contact/isolation;Supervision on toilet/commode;Pain;O2  (O2 at HS)   Cognition   Overall Cognitive Status Impaired   Arousal/Participation Alert; Cooperative   Attention Difficulty attending to directions   Subjective   Subjective pt reported pain as above but agreeable to have PT   Sit to Stand   Type of Assistance Needed Physical assistance;Verbal cues   Physical Assistance Level 26%-50%   Comment min-mod with max VC/assist for L hand placement on arm rest   Sit to Stand CARE Score 3   Bed-Chair Transfer   Type of Assistance Needed Physical assistance;Verbal cues   Physical Assistance Level 26%-50%   Comment R HHA   Chair/Bed-to-Chair Transfer CARE Score 3   Walk 10 Feet   Type of Assistance Needed Physical assistance;Verbal cues   Physical Assistance Level 26%-50%   Comment 20' x 3 reps R HHA   Walk 10 Feet CARE Score 3   Walk 50 Feet with Two Turns   Type of Assistance Needed Physical assistance;Verbal cues   Physical Assistance Level 26%-50%   Comment 60' x 1 R HHA and had pt looked at picture frame on the left side to distract pt from R toe pain and inc L environmental awareness  demo SOB but no facial distress  requires R hand warming to get SpO2 results using the alan   SpO2 91-96% , [place]    Walk 50 Feet with Two Turns CARE Score 3   Walk 150 Feet   Comment unsafe due to inc confusion, dec cogntiion , new onset R big toe pain   Reason if not Attempted Safety concerns   Walk 150 Feet CARE Score 88   Wheel 50 Feet with Two Turns   Type of Assistance Needed Physical "assistance;Verbal cues   Physical Assistance Level 26%-50%   Wheel 50 Feet with Two Turns CARE Score 3   Wheel 150 Feet   Type of Assistance Needed Physical assistance;Verbal cues   Physical Assistance Level 26%-50%   Comment max VC for direction,obstacle negotiation ,attention to left environment   Wheel 150 Feet CARE Score 3   Curb or Single Stair   Style negotiated Single stair   Type of Assistance Needed Physical assistance;Verbal cues; Adaptive equipment   Physical Assistance Level 51%-75%   1 Step (Curb) CARE Score 2   4 Steps   Type of Assistance Needed Physical assistance;Verbal cues; Adaptive equipment   Physical Assistance Level 51%-75%   Comment mod-max of 1 bilat HR, VC for sequencing increases pt's confusion so at this time will just let pt perform task with min VC for L foot placement but allow sequencing on her own  no knee buckling or LOB just required assist to keep L hand on the rail  ascent fwd/descent bwd x 6 steps on 6\"    4 Steps CARE Score 2   Other Comments   Comments pt and 1:1 educated on expected behavior for pt to use call bell despite 1:1 in the room to improve carry over of safety precautions   Assessment   Treatment Assessment skilled PT focused on household distance functional mobility training with R HHA  Pt able to participate but gait and standing tolerance limited by new onset of R MTP pain which is suspected to be due to gout so meds will be initiated per CRNP Earnestine  Pt demos impaired R LE weighshifting and dec R push off during amb  also due to inc confusion in addition to impaired cognition and inattention pt requires max VC to redirect to task with assist to inc awareness/utilization of L UE during transfers and stair act  cont PT POC with inc focus on NMR training to improve overall functional indep and safety     Family/Caregiver Present yes   PT Family training done with: daughter Samira Martin present throughout tx session and updated on current recommendation for pt to have 24/7 " S/assist due to inc confusion and dec overall safety  Initial family training also scheduled on 3/9/23  Plan   Treatment/Interventions Functional transfer training; Therapeutic exercise;Gait training;Spoke to nursing;OT;Spoke to MD;Spoke to case management;Spoke to advanced practitioner   Progress Slow progress, cognitive deficits   PT Therapy Minutes   PT Time In 0930   PT Time Out 1030   PT Total Time (minutes) 60   PT Mode of treatment - Individual (minutes) 60   PT Mode of treatment - Concurrent (minutes) 0   PT Mode of treatment - Group (minutes) 0   PT Mode of treatment - Co-treat (minutes) 0   PT Mode of Treatment - Total time(minutes) 60 minutes   PT Cumulative Minutes 445

## 2023-03-07 NOTE — PROGRESS NOTES
"   03/07/23 1300   Restrictions/Precautions   Precautions 1:1;Aspiration;Bed/chair alarms;Cognitive; Fall Risk;Contact/isolation;Pain;Supervision on toilet/commode;Visual deficit  (L-inattention, dysarthria)   Weight Bearing Restrictions No   ROM Restrictions No   Lifestyle   Autonomy \"I feel SOB  \"   Sit to Lying   Type of Assistance Needed Physical assistance   Physical Assistance Level 76% or more   Comment A for trunk and LLE management  Sit to Lying CARE Score 2   Bed-Chair Transfer   Type of Assistance Needed Physical assistance;Verbal cues; Adaptive equipment   Physical Assistance Level Total assistance   Comment Mod Ax2 sit pivot w/c>EOB  Chair/Bed-to-Chair Transfer CARE Score 1   ROM - Left Upper Extremities    L Shoulder AROM   L Position Seated   L Weight/Reps/Sets 2x8   LUE ROM Comment Utilized full body mirror for visual feedback  Noted w/ difficulty attending to LUE and following 1-step directions, req repetition, vc's and demo to maximize  Pt easily distracted by others in therapy gym  Pt reporting SOB w/ inability to obtain accurate reading, therefore returned to pt's room  Cognition   Overall Cognitive Status Impaired   Arousal/Participation Lethargic;Arousable;Responsive   Attention Difficulty dividing attention   Orientation Level Oriented to person;Oriented to place;Oriented to situation;Disoriented to time   Memory Decreased short term memory;Decreased recall of precautions   Following Commands Follows one step commands with increased time or repetition   Comments lethargic, req ongoing stimuli and environmental modifications to inc alertness  Assessment   Treatment Assessment Pt seen for skilled OT session focusing on LUE NMR specifically shoulder flexion/ext and inc attention to LUE  During act, pt reporting inc fatigue/lethargy and SOB  Unable to obtain accurate reading while in therapy gym, therefore returned to room   Warmed R middle finger, SpO2 ranging high 70s to low 80s, placed on " 2LO2 and automatic SBP 86  Ax2 sit pivot to return to bed, RN notified and present  Obtained Masimo style monitor in attempt for ear lobe monitor 02, placed on finger 2* pt's small ear lobes  Spot checked SpO2 ranging in mid 80s and ultimately inc to 4LO2  Refer to ACR vitals, Manual SBP 82, HR ranging high 80s to low 90s  RN notified MD and IM  Per RN SpO2 accurately obtained w/ toe reading, stating >92% on 2LO2  35 minutes of OT session missed 2* lethargy and medical work-up  Will cont to benefit from skilled OT as medically appropriate w/ focus on LUE NMR, fxnl xfers, toileting w/ plan to trial swap out w/ Ax2, repetitive safety training, and ADL retraining  Of note,10 additional non-billable minutes spent A w/ vitals, proper positioning, and communicating w/ med team    Prognosis Fair   Problem List Decreased strength;Decreased endurance; Impaired balance;Decreased mobility; Decreased coordination; Impaired judgement;Decreased cognition;Decreased safety awareness   Recommendation   OT Discharge Recommendation   (pending progress)   OT Therapy Minutes   OT Time In 1300   OT Time Out 1355   OT Total Time (minutes) 55   OT Mode of treatment - Individual (minutes) 55   OT Mode of treatment - Concurrent (minutes) 0   OT Mode of treatment - Group (minutes) 0   OT Mode of treatment - Co-treat (minutes) 0   OT Mode of Treatment - Total time(minutes) 55 minutes   OT Cumulative Minutes 600   Therapy Time missed   Time missed?  Yes   Amount of time missed 35   Reason for time missed Extreme fatigue  (low SB)

## 2023-03-07 NOTE — PROGRESS NOTES
"THIS IS A NON-BILLABLE NOTE    PM&R on call:    Called post unwitnessed FALL    Per RN patient set off bed alarm, but has already fallen onto her left side  Confused and wanting to wash her face  Once more awake, mentation improved  Patient reports no head trauma only falling onto her left side  Denies pain      Abrasion to Left knee  MSK/Neuro exam otherwise unremarkable performed by RN  Passive ROM nonn-provocative of pain  No appreciated bruising or bumps on head  VSS stable  Once more awake, mentation improved and back to her baseline    /68 (BP Location: Right arm)   Pulse 99   Temp 97 9 °F (36 6 °C) (Oral)   Resp 20   Ht 5' 3\" (1 6 m)   Wt 57 6 kg (126 lb 15 8 oz)   SpO2 92%   BMI 22 49 kg/m²     Assessment  75year old female currently in [facility] 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 · Any further complaints of pain or changes in mental status/VS to be reported to physician to determine any further imaging or testing  · Daughter Harley Leigh was notified of the fall and plan above with her appreciation  · Attending physician in AM to be instructed to call with updates        Edwin Caruso MD  PM&R  "

## 2023-03-07 NOTE — POST FALL NOTE
"Post Fall Note    Date of Fall: 03/07/23  Observer/Reported time of Fall: Agnieszka 56  Name of Provider Notified: Dr Juan Carlos Du  Time Provider Notified: [id number]  Assessment of Patient Injury: Interruption in skin integrity; Discoloration of skin (l knee)  Post Fall Interventions: Physician notified; Circumstances of fall reviewed and documented; Fall risk precautions implemented/maitained; Comforts rounds continued; Need for additional safety measures intiated if necessary; Neuro checks intiated if indicated; Other (comment); Family/designee notified (1:1 watch)  Family/Next of kin notified?: Yes (will be notified by MD)      Brief Description of Events  Pt bed alarm activated and \"thud\" heard in room  Upon entering, pt on floor on L side  Denies pain  Denies head strike  Pt able to move as per her baseline (CVA)  Assisted up w 2 people and back to bed  No lumps or discoloration found on head  No joint pain w passive ROM  Pt able to roll side to side  Last Recorded Vitals  Blood pressure 117/68, pulse 99, temperature 97 9 °F (36 6 °C), temperature source Oral, resp  rate 20, height 5' 3\" (1 6 m), weight 57 6 kg (126 lb 15 8 oz), SpO2 92 %        Principal Problem:    Acute CVA (cerebrovascular accident) [facility])  Active Problems:    Chronic pain syndrome    Asthma    Seronegative arthropathy of multiple sites ([facility] [address] )    CHF (congestive heart failure) (HCC)    Osteoporosis    Bipolar depression (HCC)    HTN (hypertension)    Hypercalcemia    Discoloration of skin of foot    Bacteremia    Obstructive sleep apnea    History of stroke    UTI (urinary tract infection)         "

## 2023-03-07 NOTE — PHYSICAL THERAPY NOTE
Stroke Education Series    Pt participated in skilled Stroke Education Series in an individual setting to address the topic of Purpose of Rehab post stroke in both verbal and written formats  Education within this session included a review of the individual roles of the rehab team, functions of the acute rehab center, and neuro rehabilitation treatment strategies  The goal of this education session was to provide the patient with an understanding of the overall importance of the therapy process  This section reviews neuroplasticity principles as well that includes how patiens can incorporate specificity, intensity, repetition and salience into their home exercise program  This allows the patient to connect neuro rehabilitation treatment strategies to his or her individual therapy process  The patients are engaged in conversation to incorporate activities they like to do into therapy sessions  Following education, the patient's response to education is: verbalizes understanding and needs reinforcment   Additional topics to individualize this section of stroke education include: adapting to stroke , involvement of therapy reducing risk of another stroke and how family can help in the rehab process       Start Time: Corbin Staples

## 2023-03-07 NOTE — CASE MANAGEMENT
Team Update:  Pt participating in therapies  Cm spoke with daughter Carla Marin via telephone to review team update and introduce role  Pt's daughter agreeable and appreciative of dc date, Carla Marin reported she will be in for family training with other family member 3/9  Cm explained that as time progresses, PT OT will make dc recommendations and cm will continue to keep pt and family informed and follow for dc needs

## 2023-03-07 NOTE — PCC NURSING
Pt admitted w decline in LOC from [facility] s/p Recent right MCA infarct W/petechial hemorrhage S/P thrombectomy by Dr Ernestina Bernheim 2/12 Continue Eliquis 5 mg twice daily/statin Follow-up with neurosurgery/neurology as scheduled NICM- EF has been as low as 10 to 15% in the past In 2022 was 65% Now down to 30% = pt declined [id number] [facility] felt this was secondary to her recent CVA as well as possible medication noncompliance They recommend continuing beta-blocker and adding ARB when BP allows  Will need outpatient ischemia work-up Have not been able to add back ARB yet 2/2 soft BPs and cant go down on Toprol dosing since HRs go into the low 100s at times Seronegative rheumatoid arthritis A/T rheumatology takes Plaquenil 200 mg Monday through Friday 400 mg on Saturday and Sunday Continue same Follow-up on discharge  Chronic pain- Due to compression fractures of her thoracic spine/chronic rib fractures Takes MS Contin 15 mg every 12 hours Has Oxycodone 5 mg immediate release for breakthrough pain at home  According to the patient she takes only 1 dose of breakthrough oxycodone per day  Here using tylenol for breakthrough  Bipolar disorder Continue current medication regimen SHAR- Refusing CPAP here  OK use oxygen overnight Tachycardia/low O2 on pulse ox- Some irregularity on exam in past but today RRR  EKG with PVCs  During the week, O2 sats improved when hands were warmed up  O2 sats not likely to be accurate due to circulation issues  Dysuria/Urinary frequency/incontinence- Urine culture +CRE PMR started IV Cipro 400mg every 12 hours for 5 days  Switched to po  Pt w fall 3/7- found on floor  1:1 watch ordered  Pt  incontinent of bowel and bladder at times  This week we will encourage independence with ADLs  We will monitor labs and vital signs  We will educate pt  about repositioning to prevent skin breakdown  We will perform routine skin checks  We will monitor for constipation and medicate as ordered    We will monitor for adequate pain control  We will increase safety awareness with transfers and keep pt free from falls

## 2023-03-07 NOTE — PROGRESS NOTES
Internal Medicine Progress Note  Patient: Camilla Villa  Age/sex: 75 y o  female  Medical Record #: [id number]      ASSESSMENT/PLAN: (Interval History)  Camilla Villa is seen and examined and management for following issues:    Recent right MCA infarct W/petechial hemorrhage  • S/P thrombectomy by Dr Summer Zabala 2/12  • Continue Eliquis 5 mg twice daily/statin  • Follow-up with neurosurgery/neurology as scheduled     NICM  • EF has been as low as 10 to 15% in the past  • In 2022 was 65%  • Now down to 30% = pt declined [facility]  • Cardiology felt this was secondary to her recent CVA as well as possible medication noncompliance  • They recommend continuing beta-blocker and adding ARB when BP allows  • Will need outpatient ischemia work-up  • Followed by Dr Aldair Vanegas at [facility] however would like to follow with [facility] on dc  • Have not been able to add back ARB yet 2/2 soft BPs and cant go down on Toprol dosing since HRs go into the low 100s at times     Seronegative rheumatoid arthritis  • A/T rheumatology takes Plaquenil 200 mg Monday through Friday 400 mg on Saturday and Sunday  • Continue same  • Follow-up on discharge     Hypercalcemia  · Seen by renal/endocrine on acute side  · Renal recommends starting sensipar if corrected calcium >11  · Corrected calcium 12 on 3/6/23 labs  · PMR consulting endocrine for work up    Fall  · Overnight onto left side  · Abrasion on left knee and Xrays pending  · Cannot verify whether or not she struck her head  · Will obtain CT of head  · She also c/o L hip pain will image that as well  · Fall precautions all in place    R great toe gout  · Start prednisone 40mg x 3 days then 20mg x 3 days then dc  · Check uric acid level  · No h/o gout in past a/t patient     Chronic pain  • Due to compression fractures of her thoracic spine/chronic rib fractures  • Takes MS Contin 15 mg every 12 hours  • Has Oxycodone 5 mg immediate release for breakthrough pain at home   • Pain management per PMR     Bipolar disorder  • Continue current medication regimen  • Follow-up with psychiatry on discharge  • Recommend Neuropsych consult     SHAR  • Refusing CPAP here  • OK use oxygen overnight     Dysuria/Urinary frequency/incontinence  • Urine culture +CRE  • PMR started IV Cipro 400mg every 12 hours for 5 days  • Switched to vantin 200mg bid through 3/9/23  • Symptoms improved        DC planning: TBD       The above assessment and plan was reviewed and updated as determined by my evaluation of the patient on 3/7/2023      Labs:   Results from last 7 days   Lab Units 03/06/23  1224 03/02/23  0839   WBC Thousand/uL 7 48 9 83   HEMOGLOBIN g/dL 13 1 13 5   HEMATOCRIT % 41 5 41 3   PLATELETS Thousands/uL 262 245     Results from last 7 days   Lab Units 03/06/23  1039 03/02/23  0839   SODIUM mmol/L 138 138   POTASSIUM mmol/L 3 9 4 2   CHLORIDE mmol/L 108 108   CO2 mmol/L 29 31   BUN mg/dL 15 14   CREATININE mg/dL 1 07 0 91   CALCIUM mg/dL 11 3* 10 6*                   Review of Scheduled Meds:  Current Facility-Administered Medications   Medication Dose Route Frequency Provider Last Rate   • acetaminophen  650 mg Oral Q6H PRN Rajesh Markham, DO     • albuterol  2 puff Inhalation Q6H PRN Rajesh Markham, DO     • aluminum-magnesium hydroxide-simethicone  30 mL Oral Q4H PRN Rajesh Markham, DO     • apixaban  5 mg Oral BID Rajesh Markham, DO     • atorvastatin  40 mg Oral QPM Rajesh Markham, DO     • buPROPion  100 mg Oral BID Rajesh Markham, DO     • cefpodoxime  200 mg Oral BID With Meals Rajesh Markham, DO     • DULoxetine  60 mg Oral BID Rajesh Markham, DO     • gabapentin  200 mg Oral BID Rajesh Markham, DO     • glycerin-hypromellose-  1 drop Both Eyes Q4H PRN Rajesh Markham, DO     • hydroxychloroquine  200 mg Oral Once per day on Mon Tue Wed Thu Fri Rajesh Markham, DO     • hydroxychloroquine  400 mg Oral Once per day on Sun Sat Rajesh Markham, DO     • lamoTRIgine  150 mg Oral ISABELLE Chavis Soheila,      • lidocaine  1 patch Topical Daily Ed MD Katherine     • melatonin  3 mg Oral HS LAURA Rutledge     • menthol-methyl salicylate   Apply externally 4x Daily PRN Ed MD Katherine     • metoprolol succinate  25 mg Oral BID LAURA Glynn     • morphine  15 mg Oral BID Juan Manuel Credit, DO     • ondansetron  4 mg Oral Q6H PRN Juan Manuel Credit, DO     • polyethylene glycol  17 g Oral Daily Juan Manuel Credit, DO     • polyethylene glycol  17 g Oral Daily PRN Juan Manuel Credit, DO     • senna-docusate sodium  1 tablet Oral BID Juan Manuel Credit, DO         Subjective/ HPI: Patient seen and examined  Patients overnight issues or events were reviewed with nursing or staff during rounds or morning huddle session  New or overnight issues include the following:     Pt seen in PT today with daughter present  We discussed the fall overnight, pt cannot definitively say whether or not she struck her head and both are in agreement to obtain CT of head  She also has some new left hip pain this am which is the side she fell onto  Her right great toe appears to have gout    ROS:   A 10 point ROS was performed; negative except as noted above         Imaging:     XR knee 1 or 2 vw left    (Results Pending)       *Labs /Radiology studies reviewed  *Medications reviewed and reconciled as needed  *Please refer to order section for additional ordered labs studies  *Case discussed with primary attending during morning huddle case rounds    Physical Examination:  Vitals:   Vitals:    03/06/23 2012 03/07/23 0045 03/07/23 0500 03/07/23 0801   BP: 95/63 117/68 114/69 118/62   BP Location: Right arm Right arm Right arm    Pulse: 96 99 88 85   Resp: 20 20 18    Temp: 97 5 °F (36 4 °C) 97 9 °F (36 6 °C) 98 2 °F (36 8 °C)    TempSrc: Oral Oral Oral    SpO2: 93% 92%     Weight:       Height:           GEN: No apparent distress, interactive, pleasant, frail  NEURO: Alert and oriented x3; left neglect, cognitive issues as well as recall issues, mild dysarthria  HEENT: Pupils are equal and reactive, EOMI, mucous membranes are moist, face asymmetrical  CV: S1 S2 regular, no MRG, no peripheral edema noted  RESP: Lungs are clear bilaterally, no wheezes, rales or rhonchi noted, on room air, respirations easy and non labored  GI: Flat, soft non tender, non distended; +BS x4  : Voiding without difficulty  MUSC: Moves all extremities; left hemiparesis; right great toe +erythema and painful to touch  SKIN: pink, warm and dry, normal turgor, no rashes, lesions         The above physical exam was reviewed and updated as determined by my evaluation of the patient on 3/7/2023  Invasive Devices     Peripheral Intravenous Line  Duration           Peripheral IV 03/05/23 Right; Lower Forearm 1 day          Drain  Duration           External Urinary Catheter 6 days                   VTE Pharmacologic Prophylaxis: Eliquis  Code Status: Level 1 - Full Code  Current Length of Stay: 7 day(s)      Total time spent:  30 minutes with more than 50% spent counseling/coordinating care  Counseling includes discussion with patient re: progress  and discussion with patient of his/her current medical state/information  Coordination of patient's care was performed in conjunction with primary service  Time invested included review of patient's labs, vitals, and management of their comorbidities with continued monitoring  In addition, this patient was discussed with medical team including physician and advanced extenders  The care of the patient was extensively discussed and appropriate treatment plan was formulated unique for this patient  Medical decision making for the day was made by supervising physician unless otherwise noted in their attestation statement  ** Please Note:  voice to text software may have been used in the creation of this document   Although proof errors in transcription or interpretation are a potential of such software**

## 2023-03-07 NOTE — PROGRESS NOTES
03/07/23 1230   Pain Assessment   Pain Assessment Tool 0-10   Pain Score 5   Pain Location/Orientation Orientation: Right  (big toe)   Pain Onset/Description Onset: Ongoing;Frequency: Constant/Continuous   Effect of Pain on Daily Activities dec standing and gait tolerance   Restrictions/Precautions   Precautions 1:1;Aspiration;Bed/chair alarms;Cognitive; Fall Risk;Supervision on toilet/commode;Visual deficit;Contact/isolation  (L side inattention)   Cognition   Overall Cognitive Status Impaired   Arousal/Participation Cooperative  (started to get drowsy during SE but perked up with mobility training)   Attention Difficulty attending to directions   Orientation Level Oriented X4   Memory Decreased short term memory;Decreased recall of recent events;Decreased recall of precautions   Following Commands Follows one step commands with increased time or repetition   Subjective   Subjective pt just finished lunch with 1:1  pt did cough up some food/fluid which per pt was pudding, ST notified  Sit to Stand   Type of Assistance Needed Physical assistance;Verbal cues   Physical Assistance Level 26%-50%   Sit to Stand CARE Score 3   Bed-Chair Transfer   Type of Assistance Needed Physical assistance;Verbal cues   Physical Assistance Level 26%-50%   Comment R HHA   Chair/Bed-to-Chair Transfer CARE Score 3   Walk 10 Feet   Type of Assistance Needed Physical assistance;Verbal cues   Physical Assistance Level 26%-50%   Comment 20 x 2, 10' x 1 R HHA or hands on hips guarding - reports of R toe pain   Walk 10 Feet CARE Score 3   Other Comments   Comments stroke ED: purpose of rehab topic   Assessment   Treatment Assessment skilled PT for 30 mins focused on SE but pt started to become drowsy so had pt engaged in functional mobility training at least household distances only  pt tolerated mobility but cont to c/o ongoing R toe pain  pt then remained on the w/c for OT session  Problem List Decreased strength; Impaired balance;Decreased range of motion;Decreased endurance;Decreased mobility; Decreased cognition; Impaired judgement;Decreased safety awareness;Decreased coordination;Pain; Impaired vision   PT Barriers   Functional Limitation Car transfers; Ramp negotiation;Stair negotiation;Transfers;Standing;Walking   Plan   Treatment/Interventions Functional transfer training; Therapeutic exercise; Endurance training;Gait training;Spoke to nursing;OT   Progress Slow progress, cognitive deficits   PT Therapy Minutes   PT Time In 1230   PT Time Out 1300   PT Total Time (minutes) 30   PT Mode of treatment - Individual (minutes) 30   PT Mode of treatment - Concurrent (minutes) 0   PT Mode of treatment - Group (minutes) 0   PT Mode of treatment - Co-treat (minutes) 0   PT Mode of Treatment - Total time(minutes) 30 minutes   PT Cumulative Minutes 475   Therapy Time missed   Time missed?  No

## 2023-03-07 NOTE — PROGRESS NOTES
03/07/23 0800   Pain Assessment   Pain Assessment Tool 0-10   Pain Score No Pain   Restrictions/Precautions   Precautions 1:1;Aspiration;Bed/chair alarms;Cognitive; Fall Risk;Contact/isolation; Impulsive;O2;Supervision on toilet/commode   Comprehension   Comprehension (FIM) 3 - Understands basic info/conversation 50-74% of time   Expression   Expression (FIM) 3 - Expresses basic info/needs 50-74% of time   Social Interaction   Social Interaction (FIM) 4 - Needs redirecting for appropriate language or to initiate interaction   Problem Solving   Problem solving (FIM) 2 - Solves basic problems 25-49% of time   Memory   Memory (FIM) 3 - Recognizes, recalls/performs 50-74%   Speech/Language/Cognition Assessmetn   Treatment Assessment Pt seen for ST tx session targeting speech and cognitive linguistic skills, following dysphagia tx session w/ breakfast tray  Upon arrival, pt verbalized need for BM and SLP/ student SLP assisted in pt transfer and toileting/ dressing  Pt req max verbal cues for transfers, toileting and dressing d/t deficits in sequencing, planning, task initiation, command following and safety  Pt also required tactile cues d/t impairment in R/L discrimination during dressing and dec command following  During these times pt exhibiting dec awareness of deficits req SLP to provide continual verbal cues and physical stim/ placement of extremities for pt to complete tasks and maintain safety  Pt generally oriented to place and situation however w/ episodes of confusion today stating she was in [place] last night when her fall occurred  Pt speech remains weak and dysarthric but intelligible and pt receptive to natural cueing by conversational partner when communication breakdown occurs   Pt completed remaining portions of the CLQT+ today with a Composite Severity Rating score of 2 2 out of 4 0, correlating to overall moderately impaired  cognitive linguistic impairments at time of evaluation and in comparison to age matched peers ranging from 22-74 y/o  Pt scored at or above criterion cut score for 3 out of 10 tasks completed  Pt's Cognitive Domain Scores are as follows:      Cognitive Domain: Score:  Severity Rating:   Attention   73 moderately impaired    Memory 142 mildly impaired        Executive Functions 9 severely impaired       Language 29 WNL       Visuospatial Skills  34 severely impaired       Clock Drawing Screen    9 moderately impaired    Overall Composite Severity Rating Score 2 2 out of 4 0 moderately impaired       Plan to review results of assessment with review of overall results and performance on each cognitive domain, as well as specific focus on weaker areas, which will primarily be targeted during therapy sessions  At this time pt continues to benefit from skilled SLP services targeting cognitive linguistic skills to minimize caregiver burden upon d/c      Eating   Type of Assistance Needed Set-up / clean-up;Supervision;Verbal cues   Physical Assistance Level No physical assistance   Eating CARE Score 4   Swallow Assessment   Swallow Treatment Assessment   Daily Dysphagia Tx Note     Patient Name: Jonah Franks    LFLHP'Z Date: 3/7/2023      Current Risks for Dysphagia & Aspiration: Weak voicing; weak cough; dysarthria; general debilitation; new neuro event; brain injury; cognitive deficit; hx neurologic dx; positioning issues     Current Symptoms/Concerns: Cough; throat clear; with liquids; during meals; with pills; difficulty chewing; decreased oral intake; hx of dysphagia/aspiration     Current diet: puree/level 1 diet and thin liquids      Premorbid diet::upon d/c from prior admission, pt appeared to be on dysphagia level 2/thin liquids upon prior admission for stroke initially; unknown prior to admission, pt rx being on softer foods as well as thickened liquids in the past       Follows commands: w/ verbal cues      Cognitive Status: Awake, alert and cooperative      Positioning: upright in recliner     Items administered:Consistencies Administered: thin liquids, Puree  Materials administered included Pureed [place] toast, cream of rice, vanilla yogurt, Apple juice (120 cc), coffee (120 cc), banana     Total amount of meal consumed:   50% of meal   Approx 200 cc of thin via cup sips       Oral stage:mild  Lip closure: functional around utensils, reduced w/ cup sips   Anterior spillage: min noted w/ puree and cup sips of thin   Mastication: WFL w/ puree   Bolus formation: suspected dec   Bolus control: suspect dec   Transfer: mild dec w/ puree  Oral residue: min gross oral residue noted w/ puree   Pocketing: none noted        Pharyngeal stage:moderate  Swallow promptness: min delayed   Hyolaryngeal elevation:  Weak, dec   Wet voice: none noted   Throat clear: x3 w/ bites of puree   Cough: x 8 w/ cup sips of thin   Secondary swallows: w/ puree  Audible swallows: x5 w/ sips of thin        Esophageal stage:No s/s observed  Summary:     Pt presenting with mild oral and moderate pharyngeal dysphagia today  Symptoms or concerns included decreased bolus acceptance, decreased bolus propulsion, decreased mastication, decreased bolus formation, suspected decreased control of puree/ cup sips thin , oral residue with puree  , residue effectively cleared with liquid wash and anterior loss/spillage of puree/ cup sips of thin  suspected decreased hyolaryngeal elevation upon palpation, multiple swallows, audible swallows and throat clearing and coughing w/ puree and cup sips of thin  Pt seen for f/u dysphagia tx w/ breakfast tray of puree and thin, pt declined attempts to trial upgraded food consistencies (dysphagia level 2)  Pt required tray set up and assist as needed throughout meal d/t L sided weakness/ incoordination and inattention then able to self feed w/ R hand  Pt demo increased inattention to L hand today, setting L hand in bowl of cream of rice unaware until verbal reminder   Pt w/ dec appetite today only consuming entirety of pureed [place] toast then declining further attempts at PO  Labial seal appropriate to retrieve food from utensil but dec w/ cup sips of thin and for oral containment during mastication, resulting in min anterior spill w/ cup sips of thin and puree  Bolus formation and transfer dec w/ puree resulting in min gross oral residue, cleared w/ liquid wash  Swallow initiation min delayed w/ puree, fairly prompt w/ sips of thin and hyolaryngeal excursion weak upon palpation  Pt w/ increased presence of concerning pharyngeal symptoms observred today- throat clearing, coughing and audible swallows present during intake of puree and thin  At this time pt to remain on current diet of puree/ thin for now, plan to reassess tomorrow w/ current diet  SLP consulted w/ staff regarding lunch and dinner, where staff reported no diff increased overt s/s of aspiration observed w/ intake of thin liquids  Should increased s/s persist w/ thin liquids consider downgrade to NTL  SLP spoke to RN regarding this plan  VFSS to determine current swallow function and least restrictive diet  Recommendations:  Diet: puree/level 1 diet and thin liquids by cup- small single sips only   Meds: whole with puree  Strategies: upright posture, only feed when fully alert, slow rate of feeding, small bites/sips, no straws, quiet environment (tv off, limit talking, door closed, etc ) and alternating bites and sips    FULL supervision w/ meals  Results reviewed with:  patient and RN   Aspiration precautions posted  F/u ST tx: Pt will continue to benefit from ongoing skilled dysphagia tx sessions to establish safest least restrictive diet w/o increased oropharyngeal or aspiration sxs as well as monitor ability to carryover swallow strategies independently      Plan: Initiate level 2 food trials under SLP supervision ONLY           Trial thin liquids w/ straw sips            If increased signs/sxs of aspiration increased as dysphagia tx sessions progress, consider repeat VFSS to  determine current swallow function and least restrictive diet  Swallow Assessment Prognosis   Prognosis Fair   Prognosis Considerations Age;Previous level of function;Severity of impairments   SLP Therapy Minutes   SLP Time In 0800   SLP Time Out 0930   SLP Total Time (minutes) 90   SLP Mode of treatment - Individual (minutes) 90   SLP Mode of treatment - Concurrent (minutes) 0   SLP Mode of treatment - Group (minutes) 0   SLP Mode of treatment - Co-treat (minutes) 0   SLP Mode of Treatment - Total time(minutes) 90 minutes   SLP Cumulative Minutes 315   Therapy Time missed   Time missed?  No

## 2023-03-08 LAB
ALBUMIN SERPL BCP-MCNC: 2.4 G/DL (ref 3.5–5)
CALCIUM SERPL-MCNC: 11.3 MG/DL (ref 8.3–10.1)
MAGNESIUM SERPL-MCNC: 2 MG/DL (ref 1.6–2.6)
PHOSPHATE SERPL-MCNC: 1.7 MG/DL (ref 2.3–4.1)
PTH-INTACT SERPL-MCNC: 76.2 PG/ML (ref 18.4–80.1)

## 2023-03-08 RX ORDER — LORAZEPAM 0.5 MG/1
0.25 TABLET ORAL 2 TIMES DAILY PRN
Status: DISCONTINUED | OUTPATIENT
Start: 2023-03-08 | End: 2023-03-25 | Stop reason: HOSPADM

## 2023-03-08 RX ORDER — SODIUM CHLORIDE 9 MG/ML
80 INJECTION, SOLUTION INTRAVENOUS ONCE
Status: COMPLETED | OUTPATIENT
Start: 2023-03-08 | End: 2023-03-08

## 2023-03-08 RX ORDER — BISACODYL 10 MG
10 SUPPOSITORY, RECTAL RECTAL ONCE
Status: DISCONTINUED | OUTPATIENT
Start: 2023-03-08 | End: 2023-03-25 | Stop reason: HOSPADM

## 2023-03-08 RX ORDER — CINACALCET 30 MG/1
30 TABLET, FILM COATED ORAL
Status: DISCONTINUED | OUTPATIENT
Start: 2023-03-08 | End: 2023-03-25 | Stop reason: HOSPADM

## 2023-03-08 RX ADMIN — ACETAMINOPHEN 650 MG: 325 TABLET ORAL at 13:27

## 2023-03-08 RX ADMIN — PREDNISONE 40 MG: 20 TABLET ORAL at 08:28

## 2023-03-08 RX ADMIN — LORAZEPAM 0.25 MG: 0.5 TABLET ORAL at 09:54

## 2023-03-08 RX ADMIN — LAMOTRIGINE 150 MG: 100 TABLET ORAL at 21:04

## 2023-03-08 RX ADMIN — ONDANSETRON 4 MG: 4 TABLET, ORALLY DISINTEGRATING ORAL at 11:57

## 2023-03-08 RX ADMIN — GABAPENTIN 100 MG: 100 CAPSULE ORAL at 17:28

## 2023-03-08 RX ADMIN — ALUMINUM HYDROXIDE, MAGNESIUM HYDROXIDE, AND DIMETHICONE 30 ML: 200; 20; 200 SUSPENSION ORAL at 08:42

## 2023-03-08 RX ADMIN — SALINE NASAL SPRAY 1 SPRAY: 1.5 SOLUTION NASAL at 03:00

## 2023-03-08 RX ADMIN — SENNOSIDES AND DOCUSATE SODIUM 1 TABLET: 8.6; 5 TABLET ORAL at 17:28

## 2023-03-08 RX ADMIN — CEFPODOXIME PROXETIL 200 MG: 200 TABLET, FILM COATED ORAL at 15:37

## 2023-03-08 RX ADMIN — LIDOCAINE 5% 1 PATCH: 700 PATCH TOPICAL at 08:29

## 2023-03-08 RX ADMIN — MELATONIN 3 MG: at 21:04

## 2023-03-08 RX ADMIN — ALUMINUM HYDROXIDE, MAGNESIUM HYDROXIDE, AND DIMETHICONE 30 ML: 200; 20; 200 SUSPENSION ORAL at 17:34

## 2023-03-08 RX ADMIN — APIXABAN 5 MG: 5 TABLET, FILM COATED ORAL at 08:28

## 2023-03-08 RX ADMIN — DULOXETINE HYDROCHLORIDE 60 MG: 60 CAPSULE, DELAYED RELEASE ORAL at 21:04

## 2023-03-08 RX ADMIN — CEFPODOXIME PROXETIL 200 MG: 200 TABLET, FILM COATED ORAL at 08:31

## 2023-03-08 RX ADMIN — BUPROPION HYDROCHLORIDE TABLETS 100 MG: 100 TABLET, FILM COATED ORAL at 08:32

## 2023-03-08 RX ADMIN — SENNOSIDES AND DOCUSATE SODIUM 1 TABLET: 8.6; 5 TABLET ORAL at 08:27

## 2023-03-08 RX ADMIN — GABAPENTIN 100 MG: 100 CAPSULE ORAL at 08:28

## 2023-03-08 RX ADMIN — METOPROLOL SUCCINATE 25 MG: 25 TABLET, EXTENDED RELEASE ORAL at 08:28

## 2023-03-08 RX ADMIN — DULOXETINE HYDROCHLORIDE 60 MG: 60 CAPSULE, DELAYED RELEASE ORAL at 08:28

## 2023-03-08 RX ADMIN — HYDROXYCHLOROQUINE SULFATE 200 MG: 200 TABLET ORAL at 08:31

## 2023-03-08 RX ADMIN — SODIUM CHLORIDE 80 ML/HR: 0.9 INJECTION, SOLUTION INTRAVENOUS at 18:21

## 2023-03-08 RX ADMIN — APIXABAN 5 MG: 5 TABLET, FILM COATED ORAL at 17:28

## 2023-03-08 RX ADMIN — ATORVASTATIN CALCIUM 40 MG: 40 TABLET, FILM COATED ORAL at 17:28

## 2023-03-08 RX ADMIN — ONDANSETRON 4 MG: 4 TABLET, ORALLY DISINTEGRATING ORAL at 21:05

## 2023-03-08 RX ADMIN — CINACALCET 30 MG: 30 TABLET, FILM COATED ORAL at 15:37

## 2023-03-08 RX ADMIN — BUPROPION HYDROCHLORIDE TABLETS 100 MG: 100 TABLET, FILM COATED ORAL at 17:34

## 2023-03-08 NOTE — PROGRESS NOTES
03/08/23 1100   Pain Assessment   Pain Assessment Tool 0-10   Pain Score No Pain   Restrictions/Precautions   Precautions 1:1;Aspiration;Bed/chair alarms;Cognitive; Fall Risk;Impulsive;Contact/isolation;O2;Supervision on toilet/commode;Visual deficit   Comprehension   Comprehension (FIM) 3 - Understands basic info/conversation 50-74% of time   Expression   Expression (FIM) 2 - Understands basic info/conversation 25-49% of time   Social Interaction   Social Interaction (FIM) 5 - Interacts appropriately with others 90% of time   Problem Solving   Problem solving (FIM) 3 - Solves basic problmes 50-74% of time   Memory   Memory (FIM) 2 - Recognizes, recalls/performs 25-49%   Eating   Type of Assistance Needed Set-up / clean-up;Supervision;Verbal cues   Physical Assistance Level No physical assistance   Eating CARE Score 4   Swallow Assessment   Swallow Treatment Assessment Daily Dysphagia Tx Note     Patient Name: Eli Rao    XTQHS'C Date: 3/8/2023      Current Risks for Dysphagia & Aspiration: Weak voicing; weak cough; dysarthria; general debilitation; new neuro event; brain injury; cognitive deficit; hx neurologic dx; positioning issues     Current Symptoms/Concerns: Cough; throat clear; with liquids; during meals; with pills; difficulty chewing; decreased oral intake; hx of dysphagia/aspiration     Current diet: puree/level 1 diet and thin liquids      Premorbid diet::upon d/c from prior admission, pt appeared to be on dysphagia level 2/thin liquids upon prior admission for stroke initially; unknown prior to admission, pt rx being on softer foods as well as thickened liquids in the past       Follows commands: w/ verbal cues      Cognitive Status: Awake, alert and cooperative w/ moments of confused refer to below for more details      Positioning: upright in recliner      Items administered:Consistencies Administered: thin liquids, puree, soft solids and mixed consistency  Materials administered included cottage cheese, diced peaches, diced pears, vanilla magic cup and mashed potatoes w/ gravy, ginger ale (180 cc)      Total amount of meal consumed:   25% of meal (only ate 50% of the magic cup, a few bites of mashed potatoes/ gravy)   Approximately 120 cc of thin by cup sip         Oral stage:moderate  Lip closure: adequate around utensils, weak w/ cup sips resulting in anterior spill   Anterior spillage: w/ cup sips of thin   Mastication: mod prolonged w/ soft solids and mixed consistency   Bolus formation: suspect decrease w/ soft solids and mixed consistency  Bolus control: decreased   Transfer: delayed w/ soft solids, mixed consistency and puree   Oral residue: mod gross oral residue w/ mixed consistency, soft solids and min lingual residue w/ puree   Pocketing: None noted          Pharyngeal stage:moderate-severe  Swallow promptness: fairly prompt w/ thin and puree, delayed w/ soft solids and mixed consistency   Hyolaryngeal elevation: weak, dec upon palpation   Wet voice: none noted   Throat clear: none noted   Cough: x 3 w/ magic cup producing mucus thick white phelm,  x 3 w/ diced fruit producing 2 whole fruit pieces, x1 w/ cup sip of thin   Secondary swallows: w/ magic cup  Audible swallows: x 4 w/ cup sips of thin, x 1 w/ magic cup        Esophageal stage:Belching x2 after sequential cup sips of thin         Summary:     Pt presenting with moderate oral and moderate-severe pharyngeal dysphagia today   Symptoms or concerns included decreased bolus acceptance, decreased bolus propulsion, decreased mastication, decreased bolus formation, delayed oral intiation, suspected decreased control of puree, cottage cheese, diced fruit and thin liquids, oral residue with all consistencies  , residue effectively cleared with liquid wash  And second swallow anterior loss/spillage of thin via cup sip  suspected pharyngeal swallow delay, suspected decreased hyolaryngeal elevation upon palpation, suspected pharyngeal "residue, effortful swallow, audible swallows and coughing  Pt seen for f/u dysphagia tx w/ Dysphagia level 1 lunch tray and trial tray of dysphagia level 2 foods  Pt unoriented to [place] or date today, w/ intermittent episodes of visual hallucinations \" there is someone standing behind you\" and nonsensical ramblings \"those are all protein\" (referring to lumps in magic cup), \"use that (knife) to look at my throat  \"  Pt also c/o shortness of breath during PO intake w/ O2 stats on 2L ranging from 92-95 and mildly elevated HR ranging from 111-120  Continued inattention/ involuntary movements of L hand during meal, requiring physical assist to remove hand from mashed potatoes  Pt w/ weak labial seal during cup sips resulting in min-mod amount of anterior spill, anterior spill also d/t pt inattention to opening on lid of cup, spilling out of the side  Oral manipulation and containment of all consistencies was poor resulting in mod gross oral residue w/ soft solids and mixed consistency, min amount of lingual residue w/ puree and suspect premature spillage  Oral residue effectively cleared w/ second swallow and liquid wash  Delayed swallow initiation present w/ soft solids and mixed consistency, fairly prompt w/ cup sips of thin and puree w/ weak, dec laryngeal elevation  Pt w/ coughing after- diced fruit producing 2 whole fruit pieces, magic cup producing mucus thick white phelm and only once w/ cup sip of thin  Multiple swallows present w/ intake of puree and audible swallows observed w/ cup sips of thin and puree  Pt w/ limited PO intake (25% of meal) d/t complaint of nausea, nursing notified  Pt education provided on rationale for current diet and strategies, as well as recommendations for VFSS  Pt stated understanding and agreeable at this time  At this time pt appropriate to remain on current diet, VFSS scheduled for tomorrow (3/9/23) to further assess current swallow function and least restrictive diet      " Recommendations:  Diet: puree/level 1 diet and thin liquids  Meds: whole with puree  Strategies: upright posture, only feed when fully alert, slow rate of feeding, small bites/sips, no straws, quiet environment (tv off, limit talking, door closed, etc ) and alternating bites and sips    FULL supervision w/ meals  Tray set up and assist as needed throughout meal    Results reviewed with:  patient, RN and PCA   Aspiration precautions posted  F/u ST tx: Pt will continue to benefit from ongoing skilled dysphagia tx sessions to establish safest least restrictive diet w/o increased oropharyngeal or aspiration sxs as well as monitor ability to carryover swallow strategies independently  Plan: Initiate level 2 food trials under SLP supervision ONLY           Trial thin liquids w/ straw sips   VFSS scheduled for tomorrow (3/9/23) to determine current swallow function and least restrictive diet  Swallow Assessment Prognosis   Prognosis Guarded   Prognosis Considerations Co-morbidities;Previous level of function;Severity of impairments;New learning ability;Ability to carry over   SLP Therapy Minutes   SLP Time In 1100   SLP Time Out 1200   SLP Total Time (minutes) 60   SLP Mode of treatment - Individual (minutes) 60   SLP Mode of treatment - Concurrent (minutes) 0   SLP Mode of treatment - Group (minutes) 0   SLP Mode of treatment - Co-treat (minutes) 0   SLP Mode of Treatment - Total time(minutes) 60 minutes   SLP Cumulative Minutes 375   Therapy Time missed   Time missed?  No

## 2023-03-08 NOTE — CONSULTS
Consultation - [address] Megan Miles 75 y o  female MRN: [id number]  Unit/Bed#: -01 Encounter: [id number]    Assessment:  Open wound of the L ankle, initial encounter  Ambulatory dysfunction      Plan:  1  Cleanse L lateral ankle wound with NSS, pat dry, and apply silvasorb gel to the wound bed  Cover with Allevyn foam dressing  Isacc dressing with T  Change every day and as needed for soilage/dislodgement  2  Preventative nursing skin care orders placed  3  Pressure relief, turning and repositioning with foam offloading wedges and waffle cushion to the chair when OOB  4  Nutrition is following along  5  [place] text wound care team with questions or concerns  Routine wound care follow-up  AVS updated  6  Follow-up with the [facility] care center as an outpatient - please call [phone] for an appointment  History of Present Illness:  Patient is a 75year old female who is admitted with the acute rehab unit s/p hospital stay for acute CVA  History of - asthma, anxiety, bipolar depression, seronegative arthropathy of mutliple joints, and SHAR  Wound care consulted for assessment of L ankle wound  Patient seen in bed, alert and awake oriented x 2, cooperative and agreeable for the assessment  Patient is on a 1:1 due to fall last night, per chart review patient fell onto her left side  RN staff reports the wound was noticed today and is related to the fall  Unable ascertain wound history from the patient  Per nursing patient is continent and ambulates with assistance  Assist with care  Patient with recent ABIs done on 2/23/23: RLE 1 24 with toe healing pressures in the normal range and LLE 1 07 with toe healing pressures in the normal range       Subjective:    Review of Systems   Unable to perform ROS: Other (confusion)       Historical Information   Past Medical History:   Diagnosis Date   • Anxiety    • Asthma    • Bipolar depression ([facility] [address] )    • Chronic narcotic dependence ([facility] [address] )    • Chronic pain    • Depression    • Enterovirus heart infection    • SHAR (obstructive sleep apnea)    • Osteoarthritis    • Peripheral neuropathy    • Plantar fasciitis of right foot    • Senile osteoporosis    • Seronegative arthropathy of multiple sites ([facility] )    • Undifferentiated connective tissue disease ([facility] )      Past Surgical History:   Procedure Laterality Date   • BREAST IMPLANT     • FIXATION KYPHOPLASTY LUMBAR SPINE     • HYSTERECTOMY     • INCONTINENCE SURGERY N/A    • IR STROKE ALERT  2/12/2023   • NASAL SEPTOPLASTY W/ TURBINOPLASTY N/A    • RECTAL PROLAPSE REPAIR N/A    • REPAIR RECTOCELE     • RHINOPLASTY N/A      Social History   Social History     Substance and Sexual Activity   Alcohol Use Not Currently    Comment: quit     Social History     Substance and Sexual Activity   Drug Use Yes   • Types: Marijuana    Comment: has medical card     E-Cigarette/Vaping   • E-Cigarette Use Never User      E-Cigarette/Vaping Substances   • Nicotine No    • THC No    • CBD No    • Flavoring No    • Other No    • Unknown No      Social History     Tobacco Use   Smoking Status Former   Smokeless Tobacco Never     Family History:   Family History   Problem Relation Age of Onset   • No Known Problems Mother    • No Known Problems Father    • Arthritis Family    • Diabetes Family    • Stroke Family        Meds/Allergies   current meds:   Current Facility-Administered Medications   Medication Dose Route Frequency   • acetaminophen (TYLENOL) tablet 650 mg  650 mg Oral Q6H PRN   • albuterol (PROVENTIL HFA,VENTOLIN HFA) inhaler 2 puff  2 puff Inhalation Q6H PRN   • aluminum-magnesium hydroxide-simethicone (MYLANTA) oral suspension 30 mL  30 mL Oral Q4H PRN   • apixaban (ELIQUIS) tablet 5 mg  5 mg Oral BID   • atorvastatin (LIPITOR) tablet 40 mg  40 mg Oral QPM   • bisacodyl (DULCOLAX) rectal suppository 10 mg  10 mg Rectal Once   • buPROPion (WELLBUTRIN) tablet 100 mg  100 mg Oral BID   • cefpodoxime (VANTIN) tablet 200 mg " 200 mg Oral BID With Meals   • cinacalcet (SENSIPAR) tablet 30 mg  30 mg Oral Daily With Breakfast   • DULoxetine (CYMBALTA) delayed release capsule 60 mg  60 mg Oral BID   • gabapentin (NEURONTIN) capsule 100 mg  100 mg Oral BID   • glycerin-hypromellose- (ARTIFICIAL TEARS) ophthalmic solution 1 drop  1 drop Both Eyes Q4H PRN   • hydroxychloroquine (PLAQUENIL) tablet 200 mg  200 mg Oral Once per day on Mon Tue Wed Thu Fri   • hydroxychloroquine (PLAQUENIL) tablet 400 mg  400 mg Oral Once per day on Sun Sat   • lamoTRIgine (LaMICtal) tablet 150 mg  150 mg Oral HS   • lidocaine (LIDODERM) 5 % patch 1 patch  1 patch Topical Daily   • LORazepam (ATIVAN) tablet 0 25 mg  0 25 mg Oral BID PRN   • melatonin tablet 3 mg  3 mg Oral HS   • menthol-methyl salicylate (BENGAY) 36-84 % cream   Apply externally 4x Daily PRN   • metoprolol succinate (TOPROL-XL) 24 hr tablet 25 mg  25 mg Oral BID   • morphine (MSIR) IR tablet 7 5 mg  7 5 mg Oral Q6H PRN   • ondansetron (ZOFRAN-ODT) dispersible tablet 4 mg  4 mg Oral Q6H PRN   • polyethylene glycol (MIRALAX) packet 17 g  17 g Oral Daily   • polyethylene glycol (MIRALAX) packet 17 g  17 g Oral Daily PRN   • [START ON 3/10/2023] predniSONE tablet 20 mg  20 mg Oral Daily   • predniSONE tablet 40 mg  40 mg Oral Daily   • senna-docusate sodium (SENOKOT S) 8 6-50 mg per tablet 1 tablet  1 tablet Oral BID   • sodium chloride (OCEAN) 0 65 % nasal spray 1 spray  1 spray Each Nare Q1H PRN     Allergies   Allergen Reactions   • Dye [Iodinated Contrast Media] Anaphylaxis     IVP dye   • Bactrim [Sulfamethoxazole-Trimethoprim] Hives   • Erythromycin Hives   • Penicillins        Objective   Vitals: Blood pressure 114/68, pulse 91, temperature 97 8 °F (36 6 °C), temperature source Axillary, resp  rate 18, height 5' 3\" (1 6 m), weight 59 7 kg (131 lb 9 8 oz), SpO2 96 %  Physical Exam  Constitutional:       General: She is not in acute distress  Appearance: She is normal weight   " She is not ill-appearing, toxic-appearing or diaphoretic  HENT:      Head: Normocephalic and atraumatic  Right Ear: External ear normal       Left Ear: External ear normal    Eyes:      Conjunctiva/sclera: Conjunctivae normal    Cardiovascular:      Pulses:           Dorsalis pedis pulses are 1+ on the right side and 1+ on the left side  Posterior tibial pulses are 1+ on the right side and 1+ on the left side  Pulmonary:      Effort: Pulmonary effort is normal  No respiratory distress  Musculoskeletal:      Right lower leg: No edema  Left lower leg: No edema  Skin:     General: Skin is warm and dry  Findings: Wound present  Comments: 1  L lateral ankle partial thickness wound - irregular shaped, partial thickness approx: 10% moist red tissue, and 90% dry yellow tissue/fibrin  Wound measures: 1 0x0 7x0 1cm  Edges fragile and attached without maceration, dry scabbing noted  Dilia-wound is dry and intact  Scant serous sanguineous drainage  Wound is located just below the bony prominence of the lateral malleolus  Low suspicion for pressure  2  B/l heels, sacrum and buttocks are intact without redness or wounds  No maceration noted in the sacral intergluteal crease  3  Keith discoloration to the b/l lower extremities  No induration, fluctuance, odor, warmth/temperature differences, redness, or purulence noted to the above mentioned wounds and skin areas assessed  New dressings applied  Patient tolerated assessment well- denies pain and no s/s of non-verbal pain or discomfort observed during the encounter  Neurological:      Mental Status: She is alert  Gait: Gait abnormal                Lab, Imaging and other studies: I have personally reviewed pertinent reports  Code Status: Level 1 - Full Code    Counseling / Coordination of Care  Total time spent today:    Total time (face-to-face and non-face-to-face) spent on today's visit was 25 minutes   This includes preparation for the visits (i e  reviewing test results from date recent hospitalizations, ER/Urgent Care/primary care visits and recent consultant office visits), performance of a medically appropriate history and examination, and orders for medications/treatments or testing  Discussed assessment findings, and plan of care/recommendations with primary service and patients RN

## 2023-03-08 NOTE — PLAN OF CARE
Problem: PAIN - ADULT  Goal: Verbalizes/displays adequate comfort level or baseline comfort level  Description: Interventions:  - Encourage patient to monitor pain and request assistance  - Assess pain using appropriate pain scale  - Administer analgesics based on type and severity of pain and evaluate response  - Implement non-pharmacological measures as appropriate and evaluate response  - Consider cultural and social influences on pain and pain management  - Notify physician/advanced practitioner if interventions unsuccessful or patient reports new pain  Outcome: Progressing     Problem: INFECTION - ADULT  Goal: Absence or prevention of progression during hospitalization  Description: INTERVENTIONS:  - Assess and monitor for signs and symptoms of infection  - Monitor lab/diagnostic results  - Monitor all insertion sites, i e  indwelling lines, tubes, and drains  - Monitor endotracheal if appropriate and nasal secretions for changes in amount and color  - [place] appropriate cooling/warming therapies per order  - Administer medications as ordered  - Instruct and encourage patient and family to use good hand hygiene technique  - Identify and instruct in appropriate isolation precautions for identified infection/condition  Outcome: Progressing  Goal: Absence of fever/infection during neutropenic period  Description: INTERVENTIONS:  - Monitor WBC    Outcome: Progressing     Problem: SAFETY ADULT  Goal: Patient will remain free of falls  Description: INTERVENTIONS:  - Educate patient/family on patient safety including physical limitations  - Instruct patient to call for assistance with activity   - Consult OT/PT to assist with strengthening/mobility   - Keep Call bell within reach  - Keep bed low and locked with side rails adjusted as appropriate  - Keep care items and personal belongings within reach  - Initiate and maintain comfort rounds  - Make Fall Risk Sign visible to staff  - Offer Toileting every 2 Hours, in advance of need  - Initiate/Maintain bed/chair alarm  - Obtain necessary fall risk management equipment: nonskid socks  - Apply yellow socks and bracelet for high fall risk patients  - Consider moving patient to room near nurses station  Outcome: Progressing  Goal: Maintain or return to baseline ADL function  Description: INTERVENTIONS:  -  Assess patient's ability to carry out ADLs; assess patient's baseline for ADL function and identify physical deficits which impact ability to perform ADLs (bathing, care of mouth/teeth, toileting, grooming, dressing, etc )  - Assess/evaluate cause of self-care deficits   - Assess range of motion  - Assess patient's mobility; develop plan if impaired  - Assess patient's need for assistive devices and provide as appropriate  - Encourage maximum independence but intervene and supervise when necessary  - Involve family in performance of ADLs  - Assess for home care needs following discharge   - Consider OT consult to assist with ADL evaluation and planning for discharge  - Provide patient education as appropriate  Outcome: Progressing  Goal: Maintains/Returns to pre admission functional level  Description: INTERVENTIONS:  - Perform BMAT or MOVE assessment daily    - Set and communicate daily mobility goal to care team and patient/family/caregiver  - Collaborate with rehabilitation services on mobility goals if consulted  - Perform Range of Motion 3 times a day  - Reposition patient every 2 hours    - Dangle patient 3 times a day  - Stand patient 3 times a day  - Ambulate patient 3 times a day  - Out of bed to chair 3 times a day   - Out of bed for meals 3 times a day  - Out of bed for toileting  - Record patient progress and toleration of activity level   Outcome: Progressing     Problem: DISCHARGE PLANNING  Goal: Discharge to home or other facility with appropriate resources  Description: INTERVENTIONS:  - Identify barriers to discharge w/patient and caregiver  - Arrange for needed discharge resources and transportation as appropriate  - Identify discharge learning needs (meds, wound care, etc )  - Arrange for interpretive services to assist at discharge as needed  - Refer to Case Management Department for coordinating discharge planning if the patient needs post-hospital services based on physician/advanced practitioner order or complex needs related to functional status, cognitive ability, or social support system  Outcome: Progressing     Problem: Nutrition/Hydration-ADULT  Goal: Nutrient/Hydration intake appropriate for improving, restoring or maintaining nutritional needs  Description: Monitor and assess patient's nutrition/hydration status for malnutrition  Collaborate with interdisciplinary team and initiate plan and interventions as ordered  Monitor patient's weight and dietary intake as ordered or per policy  Utilize nutrition screening tool and intervene as necessary  Determine patient's food preferences and provide high-protein, high-caloric foods as appropriate       INTERVENTIONS:  - Monitor oral intake, urinary output, labs, and treatment plans  - Assess nutrition and hydration status and recommend course of action  - Evaluate amount of meals eaten  - Assist patient with eating if necessary   - Allow adequate time for meals  - Recommend/ encourage appropriate diets, oral nutritional supplements, and vitamin/mineral supplements  - Order, calculate, and assess calorie counts as needed  - Recommend, monitor, and adjust tube feedings and TPN/PPN based on assessed needs  - Assess need for intravenous fluids  - Provide specific nutrition/hydration education as appropriate  - Include patient/family/caregiver in decisions related to nutrition  Outcome: Progressing     Problem: Prexisting or High Potential for Compromised Skin Integrity  Goal: Skin integrity is maintained or improved  Description: INTERVENTIONS:  - Identify patients at risk for skin breakdown  - Assess and monitor skin integrity  - Assess and monitor nutrition and hydration status  - Monitor labs   - Assess for incontinence   - Turn and reposition patient  - Assist with mobility/ambulation  - Relieve pressure over bony prominences  - Avoid friction and shearing  - Provide appropriate hygiene as needed including keeping skin clean and dry  - Evaluate need for skin moisturizer/barrier cream  - Collaborate with interdisciplinary team   - Patient/family teaching  - Consider wound care consult   Outcome: Progressing     Problem: MOBILITY - ADULT  Goal: Maintain or return to baseline ADL function  Description: INTERVENTIONS:  -  Assess patient's ability to carry out ADLs; assess patient's baseline for ADL function and identify physical deficits which impact ability to perform ADLs (bathing, care of mouth/teeth, toileting, grooming, dressing, etc )  - Assess/evaluate cause of self-care deficits   - Assess range of motion  - Assess patient's mobility; develop plan if impaired  - Assess patient's need for assistive devices and provide as appropriate  - Encourage maximum independence but intervene and supervise when necessary  - Involve family in performance of ADLs  - Assess for home care needs following discharge   - Consider OT consult to assist with ADL evaluation and planning for discharge  - Provide patient education as appropriate  Outcome: Progressing  Goal: Maintains/Returns to pre admission functional level  Description: INTERVENTIONS:  - Perform BMAT or MOVE assessment daily    - Set and communicate daily mobility goal to care team and patient/family/caregiver  - Collaborate with rehabilitation services on mobility goals if consulted  - Perform Range of Motion 3 times a day  - Reposition patient every 2 hours    - Dangle patient 3 times a day  - Stand patient 3 times a day  - Ambulate patient 3 times a day  - Out of bed to chair 3 times a day   - Out of bed for meals 3 times a day  - Out of bed for toileting  - Record patient progress and toleration of activity level   Outcome: Progressing
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
## 2023-03-08 NOTE — PROGRESS NOTES
Internal Medicine Progress Note  Patient: Yasmin Ventura  Age/sex: 75 y o  female  Medical Record #: [id number]      ASSESSMENT/PLAN: (Interval History)  Yasmin Ventura is seen and examined and management for following issues:    Recent right MCA infarct W/petechial hemorrhage  • S/P thrombectomy by Dr Mcdonnell Ranks 2/12  • Continue Eliquis 5 mg twice daily/statin  • Follow-up with neurosurgery/neurology as scheduled     NICM  • EF has been as low as 10 to 15% in the past  • In 2022 was 65%  • Now down to 30% = pt declined LifeVest  • Cardiology felt this was secondary to her recent CVA as well as possible medication noncompliance  • They recommend continuing beta-blocker and adding ARB when BP allows  • Will need outpatient ischemia work-up  • Followed by Dr Nahid Quinones at [facility] however would like to follow with [facility] on dc  • Have not been able to add back ARB yet 2/2 soft BPs and cant go down on Toprol dosing since HRs go into the low 100s at times     Seronegative rheumatoid arthritis  • A/T rheumatology takes Plaquenil 200 mg Monday through Friday 400 mg on Saturday and Sunday  • Continue same  • Follow-up on discharge     Hypercalcemia  · Seen by renal/endocrine on acute side  · Endocrine recommends starting sensipar if corrected calcium >12  · Corrected calcium 12 on 3/6/23 labs  · Endocrine input appreciated  · May possibly need imaging with contrast however will need renal input regarding pretreatment for same    Fall  · Overnight onto left side  · Abrasion on left knee and Xray negative for fracture  · Could not verify whether or not she struck her head  · CT of head stable  · She also c/o L hip pain imaging pending but able to bear weight  · Fall precautions all in place    R great toe gout  · Started prednisone 40mg 3/7 will cont 20mg daily x 3 days  · Uric acid normal  · improved     Chronic pain  • Due to compression fractures of her thoracic spine/chronic rib fractures  • Lethargic 3/7 and MS contin changed to IR  • Pain management per PMR     Bipolar disorder  • Continue current medication regimen  • Follow-up with psychiatry on discharge  • Neuropsych to follow    Anxiety  · PMR adding small dose of xanax bid    Confusion  · On and off  · Remains on 1:1 for safety s/p fall  · Decreased prednisone d/t same     SHAR  • Does not use CPAP at home d/t claustraphobia  • OK use oxygen overnight     Dysuria/Urinary frequency/incontinence  • Urine culture +CRE  • PMR started IV Cipro 400mg every 12 hours for 5 days  • Switched to vantin 200mg bid through 3/9/23  • Symptoms improved        DC planning: 3/22/23       The above assessment and plan was reviewed and updated as determined by my evaluation of the patient on 3/8/2023      Labs:   Results from last 7 days   Lab Units 03/07/23  1437 03/06/23  1224   WBC Thousand/uL 9 35 7 48   HEMOGLOBIN g/dL 14 7 13 1   HEMATOCRIT % 46 1 41 5   PLATELETS Thousands/uL 264 262     Results from last 7 days   Lab Units 03/07/23  1437 03/07/23  1047 03/06/23  1039   SODIUM mmol/L 137  --  138   POTASSIUM mmol/L 3 9  --  3 9   CHLORIDE mmol/L 104  --  108   CO2 mmol/L 32  --  29   BUN mg/dL 18  --  15   CREATININE mg/dL 1 19  --  1 07   CALCIUM mg/dL 12 0* 10 7* 11 3*             Results from last 7 days   Lab Units 03/07/23  1358   POC GLUCOSE mg/dl 127       Review of Scheduled Meds:  Current Facility-Administered Medications   Medication Dose Route Frequency Provider Last Rate   • acetaminophen  650 mg Oral Q6H PRN Celestia Fleming, DO     • albuterol  2 puff Inhalation Q6H PRN Celestia Fleming, DO     • aluminum-magnesium hydroxide-simethicone  30 mL Oral Q4H PRN Celestia Fleming, DO     • apixaban  5 mg Oral BID Celestia Fleming, DO     • atorvastatin  40 mg Oral QPM Celestia Fleming, DO     • bisacodyl  10 mg Rectal Once Vicky Bright MD     • buPROPion  100 mg Oral BID Celestia Fleming, DO     • cefpodoxime  200 mg Oral BID With Meals Celestia Fleming, DO     • DULoxetine  60 mg Oral BID Polina Carton, DO     • gabapentin  100 mg Oral BID Maybelle Severance, MD     • glycerin-hypromellose-  1 drop Both Eyes Q4H PRN Polina Carton, DO     • hydroxychloroquine  200 mg Oral Once per day on Mon Tue Wed Thu Fri Polina Carton, DO     • hydroxychloroquine  400 mg Oral Once per day on Sun Sat Polina Carton, DO     • lamoTRIgine  150 mg Oral HS Polina Carton, DO     • lidocaine  1 patch Topical Daily Guera Hubbard MD     • LORazepam  0 25 mg Oral BID PRN Maybelle Severance, MD     • melatonin  3 mg Oral HS Shandra Sung, CRNP     • menthol-methyl salicylate   Apply externally 4x Daily PRN Guera Hubbard MD     • metoprolol succinate  25 mg Oral BID Evins Blade, CRNP     • morphine  7 5 mg Oral Q6H PRN Maybelle Severance, MD     • ondansetron  4 mg Oral Q6H PRN Polina Carton, DO     • polyethylene glycol  17 g Oral Daily Polina Carton, DO     • polyethylene glycol  17 g Oral Daily PRN Polina Carton, DO     • [START ON 3/10/2023] predniSONE  20 mg Oral Daily Evins Blade, CRNP     • predniSONE  40 mg Oral Daily Evins Blade, CRNP     • senna-docusate sodium  1 tablet Oral BID Polina Carton, DO     • sodium chloride  1 spray Each Nare Q1H PRN Eloisa Alaniz MD         Subjective/ HPI: Patient seen and examined  Patients overnight issues or events were reviewed with nursing or staff during rounds or morning huddle session  New or overnight issues include the following:     Pt seen in her room today  Remembers being confused overnight  Knows where she is and date/time however does talk about her daughters being in the room  She denies any acute pain, states the pain in her great toe has improved  Denies any further urinary symptoms  Remains on 1:1    ROS:   A 10 point ROS was performed; negative except as noted above  Imaging:     XR chest portable   Final Result by Myke Hobson MD (03/07 1650)      No acute cardiopulmonary disease                    Workstation performed: [id number]         CT head wo contrast   Final Result by Alanis Shanks MD (03/07 8358)      Evolving right MCA territory infarcts with associated petechial hemorrhage which is best seen on the recent MRI examination  There is no discrete parenchymal hematoma  Workstation performed: [id number]         XR knee 1 or 2 vw left   Final Result by Teresa Glover MD (03/07 9177)      No acute osseous abnormality              Workstation performed: [id number]         XR hip/pelv 2-3 vws left if performed    (Results Pending)   XR shoulder 2+ vw left    (Results Pending)   XR spine cervical 2 or 3 vw injury    (Results Pending)       *Labs /Radiology studies reviewed  *Medications reviewed and reconciled as needed  *Please refer to order section for additional ordered labs studies  *Case discussed with primary attending during morning huddle case rounds    Physical Examination:  Vitals:   Vitals:    03/07/23 2200 03/08/23 0500 03/08/23 0652 03/08/23 0828   BP: 109/61 115/61  112/61   BP Location: Left arm Left arm  Left arm   Pulse: (!) 106 98  (!) 115   Resp: 20   20   Temp: 98 5 °F (36 9 °C) 97 9 °F (36 6 °C)  98 °F (36 7 °C)   TempSrc: Axillary Axillary  Oral   SpO2: 96% 96%  94%   Weight:   59 7 kg (131 lb 9 8 oz)    Height:           GEN: No apparent distress, anxious and frail  NEURO: Alert and oriented x3; left neglect, cognitive issues as well as recall issues, mild dysarthria; +confusion on and off  HEENT: Pupils are equal and reactive, EOMI, mucous membranes are moist, face asymmetrical  CV: S1 S2 regular, no MRG, no peripheral edema noted  RESP: Lungs are clear bilaterally, no wheezes, rales or rhonchi noted, on room air, respirations easy and non labored  GI: Flat, soft non tender, non distended; +BS x4  : Voiding without difficulty, incontinent at times  MUSC: Moves all extremities; left hemiparesis; right great toe +erythema and painful to touch  SKIN: pink, warm and dry, poor turgor, b/l LE with dressings in place for both prevention and d/t new wound refer to media         The above physical exam was reviewed and updated as determined by my evaluation of the patient on 3/8/2023  Invasive Devices     Peripheral Intravenous Line  Duration           Peripheral IV 03/05/23 Right; Lower Forearm 2 days          Drain  Duration           External Urinary Catheter 7 days                   VTE Pharmacologic Prophylaxis: Eliquis  Code Status: Level 1 - Full Code  Current Length of Stay: 8 day(s)      Total time spent:  30 minutes with more than 50% spent counseling/coordinating care  Counseling includes discussion with patient re: progress  and discussion with patient of his/her current medical state/information  Coordination of patient's care was performed in conjunction with primary service  Time invested included review of patient's labs, vitals, and management of their comorbidities with continued monitoring  In addition, this patient was discussed with medical team including physician and advanced extenders  The care of the patient was extensively discussed and appropriate treatment plan was formulated unique for this patient  Medical decision making for the day was made by supervising physician unless otherwise noted in their attestation statement  ** Please Note:  voice to text software may have been used in the creation of this document   Although proof errors in transcription or interpretation are a potential of such software**

## 2023-03-08 NOTE — PLAN OF CARE
Problem: PAIN - ADULT  Goal: Verbalizes/displays adequate comfort level or baseline comfort level  Description: Interventions:  - Encourage patient to monitor pain and request assistance  - Assess pain using appropriate pain scale  - Administer analgesics based on type and severity of pain and evaluate response  - Implement non-pharmacological measures as appropriate and evaluate response  - Consider cultural and social influences on pain and pain management  - Notify physician/advanced practitioner if interventions unsuccessful or patient reports new pain  Outcome: Progressing     Problem: INFECTION - ADULT  Goal: Absence or prevention of progression during hospitalization  Description: INTERVENTIONS:  - Assess and monitor for signs and symptoms of infection  - Monitor lab/diagnostic results  - Monitor all insertion sites, i e  indwelling lines, tubes, and drains  - Monitor endotracheal if appropriate and nasal secretions for changes in amount and color  - [place] appropriate cooling/warming therapies per order  - Administer medications as ordered  - Instruct and encourage patient and family to use good hand hygiene technique  - Identify and instruct in appropriate isolation precautions for identified infection/condition  Outcome: Progressing  Goal: Absence of fever/infection during neutropenic period  Description: INTERVENTIONS:  - Monitor WBC    Outcome: Progressing     Problem: SAFETY ADULT  Goal: Patient will remain free of falls  Description: INTERVENTIONS:  - Educate patient/family on patient safety including physical limitations  - Instruct patient to call for assistance with activity   - Consult OT/PT to assist with strengthening/mobility   - Keep Call bell within reach  - Keep bed low and locked with side rails adjusted as appropriate  - Keep care items and personal belongings within reach  - Initiate and maintain comfort rounds  - Make Fall Risk Sign visible to staff  - Offer Toileting every 2 Hours, in advance of need  - Initiate/Maintain bed/chair alarm  - Obtain necessary fall risk management equipment: bed/chair alarm  - Apply yellow socks and bracelet for high fall risk patients  - Consider moving patient to room near nurses station  Outcome: Progressing  Goal: Maintain or return to baseline ADL function  Description: INTERVENTIONS:  -  Assess patient's ability to carry out ADLs; assess patient's baseline for ADL function and identify physical deficits which impact ability to perform ADLs (bathing, care of mouth/teeth, toileting, grooming, dressing, etc )  - Assess/evaluate cause of self-care deficits   - Assess range of motion  - Assess patient's mobility; develop plan if impaired  - Assess patient's need for assistive devices and provide as appropriate  - Encourage maximum independence but intervene and supervise when necessary  - Involve family in performance of ADLs  - Assess for home care needs following discharge   - Consider OT consult to assist with ADL evaluation and planning for discharge  - Provide patient education as appropriate  Outcome: Progressing  Goal: Maintains/Returns to pre admission functional level  Description: INTERVENTIONS:  - Perform BMAT or MOVE assessment daily    - Set and communicate daily mobility goal to care team and patient/family/caregiver  - Collaborate with rehabilitation services on mobility goals if consulted  - Perform Range of Motion 3 times a day  - Reposition patient every 2 hours    - Dangle patient 3 times a day  - Stand patient 3 times a day  - Ambulate patient 3 times a day  - Out of bed to chair 3 times a day   - Out of bed for meals 3 times a day  - Out of bed for toileting  - Record patient progress and toleration of activity level   Outcome: Progressing     Problem: DISCHARGE PLANNING  Goal: Discharge to home or other facility with appropriate resources  Description: INTERVENTIONS:  - Identify barriers to discharge w/patient and caregiver  - Arrange for needed discharge resources and transportation as appropriate  - Identify discharge learning needs (meds, wound care, etc )  - Arrange for interpretive services to assist at discharge as needed  - Refer to Case Management Department for coordinating discharge planning if the patient needs post-hospital services based on physician/advanced practitioner order or complex needs related to functional status, cognitive ability, or social support system  Outcome: Progressing     Problem: Nutrition/Hydration-ADULT  Goal: Nutrient/Hydration intake appropriate for improving, restoring or maintaining nutritional needs  Description: Monitor and assess patient's nutrition/hydration status for malnutrition  Collaborate with interdisciplinary team and initiate plan and interventions as ordered  Monitor patient's weight and dietary intake as ordered or per policy  Utilize nutrition screening tool and intervene as necessary  Determine patient's food preferences and provide high-protein, high-caloric foods as appropriate       INTERVENTIONS:  - Monitor oral intake, urinary output, labs, and treatment plans  - Assess nutrition and hydration status and recommend course of action  - Evaluate amount of meals eaten  - Assist patient with eating if necessary   - Allow adequate time for meals  - Recommend/ encourage appropriate diets, oral nutritional supplements, and vitamin/mineral supplements  - Order, calculate, and assess calorie counts as needed  - Recommend, monitor, and adjust tube feedings and TPN/PPN based on assessed needs  - Assess need for intravenous fluids  - Provide specific nutrition/hydration education as appropriate  - Include patient/family/caregiver in decisions related to nutrition  Outcome: Progressing     Problem: Prexisting or High Potential for Compromised Skin Integrity  Goal: Skin integrity is maintained or improved  Description: INTERVENTIONS:  - Identify patients at risk for skin breakdown  - Assess and monitor skin integrity  - Assess and monitor nutrition and hydration status  - Monitor labs   - Assess for incontinence   - Turn and reposition patient  - Assist with mobility/ambulation  - Relieve pressure over bony prominences  - Avoid friction and shearing  - Provide appropriate hygiene as needed including keeping skin clean and dry  - Evaluate need for skin moisturizer/barrier cream  - Collaborate with interdisciplinary team   - Patient/family teaching  - Consider wound care consult   Outcome: Progressing     Problem: MOBILITY - ADULT  Goal: Maintain or return to baseline ADL function  Description: INTERVENTIONS:  -  Assess patient's ability to carry out ADLs; assess patient's baseline for ADL function and identify physical deficits which impact ability to perform ADLs (bathing, care of mouth/teeth, toileting, grooming, dressing, etc )  - Assess/evaluate cause of self-care deficits   - Assess range of motion  - Assess patient's mobility; develop plan if impaired  - Assess patient's need for assistive devices and provide as appropriate  - Encourage maximum independence but intervene and supervise when necessary  - Involve family in performance of ADLs  - Assess for home care needs following discharge   - Consider OT consult to assist with ADL evaluation and planning for discharge  - Provide patient education as appropriate  Outcome: Progressing  Goal: Maintains/Returns to pre admission functional level  Description: INTERVENTIONS:  - Perform BMAT or MOVE assessment daily    - Set and communicate daily mobility goal to care team and patient/family/caregiver  - Collaborate with rehabilitation services on mobility goals if consulted  - Perform Range of Motion 3 times a day  - Reposition patient every 2 hours    - Dangle patient 3 times a day  - Stand patient 3 times a day  - Ambulate patient 3 times a day  - Out of bed to chair 3 times a day   - Out of bed for meals 3 times a day  - Out of bed for toileting  - Record patient progress and toleration of activity level   Outcome: Progressing

## 2023-03-08 NOTE — DISCHARGE INSTR - OTHER ORDERS
Cleanse L lateral ankle wound with NSS, pat dry, and apply silvasorb gel to the wound bed  Cover with Allevyn foam dressing  Isacc dressing with T  Change every day and as needed for soilage/dislodgement  Follow-up with the [facility] wound care [place] as an outpatient - please call [phone] for an appointment  Skin Care Plan:  1-Cleanse L lateral ankle wound with NSS, pat dry, and apply silvasorb gel to the wound bed  Cover with Allevyn foam dressing  Isacc dressing with T  Change every day and as needed for soilage/dislodgement  2-Turn/reposition q2h or when medically stable for pressure re-distribution on skin   3-Elevate heels to offload pressure  4-Moisturize skin daily with skin nourishing cream  5-Ehob cushion in chair when out of bed  6-Preventative Hydraguard to buttocks, sacrum, and bilateral heels BID and PRN  7-Follow-up with the [facility] care [place] as an outpatient - please call [phone] for an appointment

## 2023-03-08 NOTE — PROGRESS NOTES
03/08/23 1400   Pain Assessment   Pain Assessment Tool 0-10   Pain Score No Pain   Restrictions/Precautions   Precautions 1:1;Aspiration;Bed/chair alarms;Cognitive; Fall Risk;Impulsive;Contact/isolation;Supervision on toilet/commode   Cognition   Overall Cognitive Status Impaired   Arousal/Participation Alert; Cooperative   Attention Difficulty attending to directions   Subjective   Subjective pt wide awake in bed had no c/o and agreeable to have PT   Sit to Lying   Type of Assistance Needed Physical assistance;Verbal cues   Physical Assistance Level 25% or less   Comment HOB flat without rail, extra time with assist on L LE   Sit to Lying CARE Score 3   Lying to Sitting on Side of Bed   Type of Assistance Needed Physical assistance;Verbal cues; Adaptive equipment;Set-up / clean-up   Physical Assistance Level 25% or less   Comment no rail but HOB elevated   Lying to Sitting on Side of Bed CARE Score 3   Sit to Stand   Type of Assistance Needed Physical assistance;Verbal cues   Physical Assistance Level 51%-75%   Sit to Stand CARE Score 2   Bed-Chair Transfer   Type of Assistance Needed Physical assistance;Verbal cues   Physical Assistance Level 51%-75%   Comment SPT without AD   Chair/Bed-to-Chair Transfer CARE Score 2   Walk 50 Feet with Two Turns   Reason if not Attempted Medical concerns   Walk 50 Feet with Two Turns CARE Score 88   Walk 150 Feet   Reason if not Attempted Medical concerns   Walk 150 Feet CARE Score 88   Walking 10 Feet on Uneven Surfaces   Comment tachycardia   Reason if not Attempted Medical concerns   Walking 10 Feet on Uneven Surfaces CARE Score 88   Wheel 50 Feet with Two Turns   Reason if not Attempted Medical concerns   Wheel 50 Feet with Two Turns CARE Score 88   Wheel 150 Feet   Comment tachycardia   Reason if not Attempted Medical concerns   Wheel 150 Feet CARE Score 88   Curb or Single Stair   Reason if not Attempted Medical concerns   1 Step (Curb) CARE Score 88   4 Steps   Reason if not Attempted Medical concerns   4 Steps CARE Score 88   12 Steps   Reason if not Attempted Medical concerns   12 Steps CARE Score 88   Therapeutic Interventions   Neuromuscular Re-Education seated dynamic reaching using L UE only with mirror for postural feedback/self correction aide, ballon tapping with L UE only, ball kicking with L LE only with reps as tolerated  or per VT results, removal/placement of bed covers using L UE with assist, dynamic reaching in bed with L UE ( hairbrush, tissue box, lotion bottle, hair bottle), L  UE Wbing for proprioceptive training   Assessment   Treatment Assessment ortho BPs taken pre mobility training, RN cleared pt for PT  Pt tolerated PT on RA with SpO2 93-95% while VT ranged 104-130  no reported OH symptoms nor demonstrated lethargy or acute distress throughout tx session which focused on NMR training to improve upright midline posture maintenance, L body /environmental awareness, hand/foot eye coordination and OOB activity tolerance  Pt required reorientation but remained slightly confused but can be redirected to task  Pt assisted back to bed at end of tx with all needs within reach and 1:1 in room  Family/Caregiver Present no   Barriers to Discharge Inaccessible home environment;Decreased caregiver support   Plan   Treatment/Interventions Functional transfer training; Therapeutic exercise; Endurance training;Bed mobility;Gait training;Spoke to nursing;OT;Spoke to MD;Spoke to case management;Spoke to advanced practitioner   Progress Slow progress, cognitive deficits  (act tolerance)   PT Therapy Minutes   PT Time In 1400   PT Time Out 1530   PT Total Time (minutes) 90   PT Mode of treatment - Individual (minutes) 90   PT Mode of treatment - Concurrent (minutes) 0   PT Mode of treatment - Group (minutes) 0   PT Mode of treatment - Co-treat (minutes) 0   PT Mode of Treatment - Total time(minutes) 90 minutes   PT Cumulative Minutes 565

## 2023-03-08 NOTE — PROGRESS NOTES
"Progress Note - Durga Gibson 75 y o  female MRN: [id number]    Unit/Bed#: -01 Encounter: [id number]      CC: hypercalcemia f/u    Subjective: This is a 75y o -year-old female with past medical history of osteoporosis, vertebral compression fractures, rheumatoid arthritis, thyroid nodule, CVA status post thromboembolectomy, hypertension, heart failure with reduced ejection fraction who presented to the hospital with altered mental status, found to have acute CVA  Patient was seen in the inpatient side for evaluation of hypercalcemia which was suspected to be due to to primary hyperthyroidism  Endocrinology consulted for evaluation of hypercalcemia  Pt reports to be feeling well  Denies of any polyuria  Reported to have polydipsia    Objective:     Vitals: Blood pressure 100/57, pulse (!) 115, temperature 98 3 °F (36 8 °C), temperature source Oral, resp  rate 18, height 5' 3\" (1 6 m), weight 59 7 kg (131 lb 9 8 oz), SpO2 96 %  ,Body mass index is 23 31 kg/m²  Intake/Output Summary (Last 24 hours) at 3/8/2023 1610  Last data filed at 3/8/2023 0901  Gross per 24 hour   Intake 440 ml   Output 475 ml   Net -35 ml       Physical Exam:  General Appearance: awake, appears stated age and cooperative  Head: Normocephalic, without obvious abnormality, atraumatic  Extremities: moves all extremities  Skin: Skin color and temperature normal    Pulm: no labored breathing    Lab, Imaging and other studies: I have personally reviewed pertinent reports        POC Glucose (mg/dl)   Date Value   03/07/2023 127   02/12/2023 113   04/23/2022 77   03/02/2022 151 (H)   03/02/2022 38 (LL)       Assessment and plan:  Hypercalcemia likely due to primary hyperparathyroidism  Corrected calcium 12 6  Given that the corrected calcium levels have been persistently elevated and greater than 12, would recommend adding Sensipar 30 mg daily  Monitor corrected calcium closely  Unable to get CT parathyroid done as the patient has " "iodine contrast allergy  Patient would need outpatient follow-up with surgery (possible parathyroidectomy given history of osteoporosis, calcium level) and endocrinology    Osteoporosis  Would recommend outpatient follow-up with endocrinology      Portions of the record may have been created with voice recognition software  Please Tigertext questions to the clinician covering the \"[id number]\" Role  Thank you      "

## 2023-03-09 ENCOUNTER — APPOINTMENT (INPATIENT)
Dept: RADIOLOGY | Facility: HOSPITAL | Age: 68
End: 2023-03-09

## 2023-03-09 PROBLEM — M10.9 GOUT OF FOOT: Status: ACTIVE | Noted: 2023-03-09

## 2023-03-09 PROBLEM — R41.82 AMS (ALTERED MENTAL STATUS): Status: ACTIVE | Noted: 2023-03-09

## 2023-03-09 PROBLEM — Z87.898 HISTORY OF BACTEREMIA: Status: ACTIVE | Noted: 2023-02-24

## 2023-03-09 PROBLEM — S91.002A WOUND OF LEFT ANKLE: Status: ACTIVE | Noted: 2023-03-09

## 2023-03-09 PROBLEM — R13.10 DYSPHAGIA: Status: ACTIVE | Noted: 2023-03-09

## 2023-03-09 LAB
ALBUMIN SERPL BCP-MCNC: 2.8 G/DL (ref 3.5–5)
CALCIUM SERPL-MCNC: 10.3 MG/DL (ref 8.3–10.1)

## 2023-03-09 RX ADMIN — PREDNISONE 40 MG: 20 TABLET ORAL at 08:36

## 2023-03-09 RX ADMIN — HYDROXYCHLOROQUINE SULFATE 200 MG: 200 TABLET ORAL at 06:50

## 2023-03-09 RX ADMIN — SENNOSIDES AND DOCUSATE SODIUM 1 TABLET: 8.6; 5 TABLET ORAL at 08:37

## 2023-03-09 RX ADMIN — ONDANSETRON 4 MG: 4 TABLET, ORALLY DISINTEGRATING ORAL at 16:58

## 2023-03-09 RX ADMIN — CEFPODOXIME PROXETIL 200 MG: 200 TABLET, FILM COATED ORAL at 16:58

## 2023-03-09 RX ADMIN — MELATONIN 3 MG: at 21:01

## 2023-03-09 RX ADMIN — CINACALCET 30 MG: 30 TABLET, FILM COATED ORAL at 06:50

## 2023-03-09 RX ADMIN — LIDOCAINE 5% 1 PATCH: 700 PATCH TOPICAL at 08:38

## 2023-03-09 RX ADMIN — ATORVASTATIN CALCIUM 40 MG: 40 TABLET, FILM COATED ORAL at 17:00

## 2023-03-09 RX ADMIN — GABAPENTIN 100 MG: 100 CAPSULE ORAL at 08:37

## 2023-03-09 RX ADMIN — DULOXETINE HYDROCHLORIDE 60 MG: 60 CAPSULE, DELAYED RELEASE ORAL at 08:36

## 2023-03-09 RX ADMIN — ONDANSETRON 4 MG: 4 TABLET, ORALLY DISINTEGRATING ORAL at 10:50

## 2023-03-09 RX ADMIN — APIXABAN 5 MG: 5 TABLET, FILM COATED ORAL at 17:00

## 2023-03-09 RX ADMIN — CEFPODOXIME PROXETIL 200 MG: 200 TABLET, FILM COATED ORAL at 06:51

## 2023-03-09 RX ADMIN — LORAZEPAM 0.25 MG: 0.5 TABLET ORAL at 23:45

## 2023-03-09 RX ADMIN — BUPROPION HYDROCHLORIDE TABLETS 100 MG: 100 TABLET, FILM COATED ORAL at 17:01

## 2023-03-09 RX ADMIN — BUPROPION HYDROCHLORIDE TABLETS 100 MG: 100 TABLET, FILM COATED ORAL at 08:37

## 2023-03-09 RX ADMIN — LAMOTRIGINE 150 MG: 100 TABLET ORAL at 21:01

## 2023-03-09 RX ADMIN — DULOXETINE HYDROCHLORIDE 60 MG: 60 CAPSULE, DELAYED RELEASE ORAL at 21:01

## 2023-03-09 RX ADMIN — METOPROLOL SUCCINATE 25 MG: 25 TABLET, EXTENDED RELEASE ORAL at 17:00

## 2023-03-09 RX ADMIN — SENNOSIDES AND DOCUSATE SODIUM 1 TABLET: 8.6; 5 TABLET ORAL at 17:00

## 2023-03-09 RX ADMIN — APIXABAN 5 MG: 5 TABLET, FILM COATED ORAL at 08:37

## 2023-03-09 RX ADMIN — GABAPENTIN 100 MG: 100 CAPSULE ORAL at 17:00

## 2023-03-09 NOTE — ASSESSMENT & PLAN NOTE
"3/7 early morning - per report \"unwitnessed FALL; Per RN patient set off bed alarm, but has already fallen onto her left side  Confused and wanting to wash her face  Once more awake, mentation improved  \"  - CTH evolving infarct without new bleeding per rads  - L knee mild pain but good ROM without significant edema or instability > improving - XR unremarkable - monitor   - Mild L hip pain but good ROM and can adequately WB > improving - L hip/pelvis XR - mild hip OA without acute findings -  monitor  - Some L shoulder pain - some mildly decreased AROM, +Muñoz > stable -  L shoulder XR - unremarkable - monitor    - Subtle neck pain but full ROM without radicular complaints or focal weakness, sensory changes > improving - C spine XR - no acute findings or change from prior - chronic changes > Chronic hyperkyphosis  Anterolisthesis at C4-5 is unchanged  No acute subluxation; Salt-and-pepper appearance of the skull accentuated by image processing/sharpening algorithm  Multilevel moderate to severe discogenic and uncovertebral degenerative changes greatest at C5-6 and C6-7  Multilevel mild facet degenerative changes greatest at C4-5  > monitor   - Optimal mgmt with wakefulness/AMS - continue 2L at night O2 with spot checks - refusing CPAP  - Has been demonstrating use of call bell and not getting up overnight without assistance  After team discussion, plan for discontinuing observation    "

## 2023-03-09 NOTE — PROGRESS NOTES
Physical Medicine and Rehabilitation Progress Note  Dolly Crane 75 y o  female MRN: [id number]  Unit/Bed#: -01 Encounter: [id number]      Assessment & Plan:     Decline in ADLs and mobility: Functional assessment  Significant assist toileting hygiene, bathing, dressing   Transfers mod-significant assist     * Acute CVA (cerebrovascular accident) [facility])  Assessment & Plan  3/8 - neuro exam stable - continue to monitor closely   CT head 3/7 - Evolving right MCA territory infarcts with associated petechial hemorrhage which is best seen on the recent MRI examination  There is no discrete parenchymal hematoma  - discussed above with radiologist Della - no acute bleeding noted   MRI brain 2/22 - 1  New small acute /more recent interval lacunar infarction in the right cerebral peduncle (series 4, image 14)  2   Extensive multifocal large, previously present right middle cerebral artery infarction which is evolving with superimposed areas of evolving hemorrhage likely related to hemorrhagic transformation of subacute right MCA infarction  No significant   mass effect  3   Mild, chronic microangiopathy  S/P thrombectomy 2/12 by Dr Elaina Kawasaki   Currently on a dysphagia 1 diet with thin liquids  Secondary stroke prophylaxis at this time with Eliquis 5 mg twice daily (aspirin d/c'd earlier); and statin; optimal BP control   PT, OT, SLP    Consideration for neuropsychology if patient agreeable  Review modifiable risk factors and provide stroke education  Monitor for poststroke pain  Close neuro follow-up as patient has had multiple strokes in the last 2 months    AMS (altered mental status)  Assessment & Plan  3/8 Patient mentation fairly stable today - continue to monitor   3/7 Patient with some sleepiness at times apparently last several days with poor activity tolerance and now s/p fall - appears back to recent baseline on follow-up eval   CT head 3/7 - Evolving right MCA territory infarcts with associated "petechial hemorrhage which is best seen on the recent MRI examination  There is no discrete parenchymal hematoma  - discussed above with radiologist Della - no acute bleeding noted   - Ensure adequate nutrition and hydration with altered diet and sub-optimal intake BUN/Cr acceptable   - Continue Eliquis, monitor vitals scout  - Comgmt with IM team   - Patient afebrile; CBC wnl; LFTs wnl; Ammonia wnl  - Continue Abx of UTI   - Calcium elevated > optimal mgmt per endo > adding Sensipar   - Hx of SHAR - continue 2L at night O2 with spot checks - refusing CPAP - obtain noct ox on 2L   - Hold standing long-acting MS Contin 15mg Q12H and transitioned to MS IR 7 5mg Q6H PRN   - Gabapentin 100mg BID weaned down recently            Fall  Assessment & Plan  3/7 early morning - per report \"unwitnessed FALL; Per RN patient set off bed alarm, but has already fallen onto her left side  Confused and wanting to wash her face  Once more awake, mentation improved  \"  - CTH evolving infarct without new bleeding per rads  - L knee mild pain but good ROM without significant edema or instability > improving - XR unremarkable - monitor   - Mild L hip pain but good ROM and can adequately WB > improving - L hip/pelvis XR - mild hip OA without acute findings -  monitor  - Some L shoulder pain - some mildly decreased AROM, +Muñoz > stable -  L shoulder XR - unremarkable - monitor    - Subtle neck pain but full ROM without radicular complaints or focal weakness, sensory changes > improving - C spine XR - no acute findings or change from prior - chronic changes > Chronic hyperkyphosis  Anterolisthesis at C4-5 is unchanged  No acute subluxation; Salt-and-pepper appearance of the skull accentuated by image processing/sharpening algorithm  Multilevel moderate to severe discogenic and uncovertebral degenerative changes greatest at C5-6 and C6-7    Multilevel mild facet degenerative changes greatest at C4-5  > monitor   - Optimal mgmt with " "wakefulness/AMS - continue 2L at night O2 with spot checks - refusing CPAP  - Continue 1:1 for safety     Asthma  Assessment & Plan  3/8 saturating well on RA/2L   3/7 afternoon - patient had questionable desat with therapy but had full improved mentation and was in no resp or other distress at that time  - Comgmt with IM who also evaluated patient at this time    - She historically has had difficulty obtaining O2 from finger tips at times - when pulse ox moved to toe she had good oxygenation on RA-2L  - CXR no acute findings - bibasilar scarring   - Continue use of albuterol inhaler 2 puffs every 6 hours as needed    Gout of foot  Assessment & Plan  Much improved on prednisone  Uric acid wnl   R 1st MTPJ redness and focal tenderness without significant edema or skin breakdown   - IM feels most c/w gout and have ordered prednisone - taper thru 3/12  - Monitor for improvement or non-improvement and other etiologies as well closely     Hypercalcemia  Assessment & Plan  Nephrology had been following, nonsuppressed PTH with concerns for primary hyperparathyroidism - they defer to endo on mgmt   C spine XR 3/7 - Salt-and-pepper appearance of the skull is partially accentuated by the image processing /sharpening algorithm, though prior head CT suggests this is a true finding  Correlate for clinical/laboratory features of hyperparathyroidism; Salt-and-pepper appearance of the skull accentuated by image processing/sharpening algorithm  However, prior head CT shows a mottled appearance of the skull as well suggesting hyperparathyroidism  No focal lytic or blastic lesions  No acute fractures  Endocrine consult   \"SPEP-no monoclonal bands, UPEP- no monoclonal bands, PTH RP less than 2, 25-hydroxy vitamin D 41 7, 1,25 hydroxy vitamin D 23 7 low, Phos 2 5 low, PTH 65 4   Assessment/Plan:  1  Hypercalcemia: Suspected to be on the basis of primary hyperparathyroidism: Corrected calcium continues to increase slightly    We will " start Sensipar 30 mg daily  Given history of osteoporosis, if appropriate surgical candidate consider surgery referral as outpatient  Follow-up with endocrinology as outpatient  Monitor corrected calcium level  Adjust dose as needed  Status post a CT of the chest abdomen and pelvis with no overt malignancy  - Patient has allergy to contrast - discussed with endo fellow      Wound of left ankle  Assessment & Plan  On lateral ankle possibly occurred at time of fall  - Wound care c/s  1  Cleanse L lateral ankle wound with NSS, pat dry, and apply silvasorb gel to the wound bed  Cover with Allevyn foam dressing  Isacc dressing with T  Change every day and as needed for soilage/dislodgement  2  Preventative nursing skin care orders placed  3   Pressure relief, turning and repositioning with foam offloading wedges and waffle cushion to the care  Prevalon boot     Dysphagia  Assessment & Plan  - Current diet: Modified - D1, thin liquids  - Recommend VBS study  - SLP evaluate and treat decline in swallowing   - Aspiration precautions; HOB completely upright when eating, 100% supervision, monitor for pocketing  - Hold oral intake if patient too confused or lethargic and notify MD   - Oral care with meals and bedtime  - Ensure adequate hydration  - Nutrition consult to assist with management   - Rehab nursing and staff to ensure safe and appropriate feeding to decrease risk of aspiration     - Rehab physician oversight dysphagia management and risks associated with it  - Monitor for appropriate fluid and nutrition intake      UTI (urinary tract infection)  Assessment & Plan  UA last week with culture growing greater than 100,000 CFU Morganella and Serratia  Started on Ciprofloxacin but transitioned to Cefpodixime per prior providers with plan to continue into 3/10   Dysuria improving     History of stroke  Assessment & Plan  Patient with recent admission in early 2023 for right MCA CVA with petechial hemorrhage and residual left-sided weakness and dysarthria  Patient had mechanical thrombectomy with Dr Stefany Shah on 2/12    Obstructive sleep apnea  Assessment & Plan  Patient refusing CPAP 2/2 claustrophobia - refuses home or hospital CPAP   2L O2 at night with spot checks   Obtain noct ox on 2L; repeat noct ox prior to d/c for possible home DME     History of bacteremia  Assessment & Plan  Unclear etiology initially of whether her altered mental status was related to stroke versus polypharmacy versus opioid overdose versus infection versus seizure  1/2 micrococcus with a second set staph coag negative and followed by infectious disease after course of antibiotic treatment  Has been off of antibiotics and repeat blood cultures negative to date, ID felt may be contaminant    Discoloration of skin of foot  Assessment & Plan  N/V intact - no significant discoloration today   Per daughter and patient patient's foot will become blue or red and change colors frequently  No evidence for infectious etiology and had a lower extremity arterial duplex with no evidence of any significant arterial occlusive disease  May be related to the seronegative spondyloarthropathies  Likely the source of of the inaccurate and inconsistent pulse oximetry readings in the hands as well    HTN (hypertension)  Assessment & Plan  Patient with some orthostasis  IM consulted and with overall management at their discretion during ARC course  Monitor orthostatics; PRN ADDY hose, abdominal binder - ensure adequate hydration   Currently on metoprolol succinate 25 mg twice daily with holding parameters   Plan per cardiology did not add an ARB in the setting of heart failure however as blood pressure allows    Bipolar depression (HCC)  Assessment & Plan  Currently on Wellbutrin 100 mg twice daily, Lamictal 150 mg nightly, and Cymbalta 60 mg twice daily  Consistent with home regimen  Continue monitoring for stability, mood lability    Consider neuropsychology consultation if patient agreeable    Cervical spondylosis  Assessment & Plan  C spine XR 3/7 - Chronic hyperkyphosis  Anterolisthesis at C4-5 is unchanged  No acute subluxation; Salt-and-pepper appearance of the skull accentuated by image processing/sharpening algorithm  However, prior head CT shows a mottled appearance of the skull as well suggesting hyperparathyroidism  No focal lytic or blastic lesions  No acute fractures  Multilevel moderate to severe discogenic and uncovertebral degenerative changes greatest at C5-6 and C6-7    Multilevel mild facet degenerative changes greatest at C4-5   - No signs of myelopathy or radiculopathy - monitor     Osteoporosis  Assessment & Plan  History of an L1 compression fracture status post kyphoplasty  Was previously on Prolia has not been on since 2020  Outpatient referral to endocrine    CHF (congestive heart failure) ([facility])  Assessment & Plan  Wt Readings from Last 3 Encounters:   03/01/23 57 6 kg (126 lb 15 8 oz)   02/16/23 62 6 kg (138 lb)   02/12/23 67 9 kg (149 lb 11 1 oz)       Cardiomyopathy with reduced ejection fraction as low as 10 to 15% back in 2004 but has improved since and most recently was 25 to 30% on last admission  CXR 3/7 unremarkable; does not appear volume overloaded   Patient was offered LifeVest but declined  On metoprolol succinate 25 mg twice daily, with plan to add an ARB as her blood pressure would allow  Strict I's and O's and daily weights  Follows with Dr Eliza Valdez at [facility]      Seronegative arthropathy of multiple sites [facility])  [address] with rheumatology and is on Plaquenil 200 mg Monday through Friday and 400 mg on Saturday Sunday  Continue home regimen and follow-up with rheumatology on discharge    Chronic pain syndrome  Assessment & Plan  History of chronic pain secondary to compression fractures in her thoracic spine and chronic rib fractures   Patient with over sleepiness at times at sedating meds decreased some recently   · Holding standing long-acting MS Contin 15mg Q12H and transitioned to MS IR 7 5mg Q6H PRN > pain still adequately controlled > monitor closely   · Gabapentin 100mg BID   · Cymbalta 60 milligrams twice daily  · Acetaminophen 650 mg every 6 hours as needed  · Was previously on oxycodone as needed at [facility] but per family due to concerns for polypharmacy and additional opioids beyond her morphine has not been on an acute care since 2/16  · Lidoderm and Bengay  Aqua K also ordered with instructions not to place over same area  Restrictions include: Fall precautions    Objective: Allergies per EMR  Diagnostic Studies: Reviewed  XR hip/pelv 2-3 vws left if performed   Final Result by Slade Gordon MD (03/08 1633)      No acute osseous abnormality  Workstation performed: [id number]         XR shoulder 2+ vw left   Final Result by Slade Gordon MD (03/08 1635)      No acute osseous abnormality  Workstation performed: [id number]         XR spine cervical 2 or 3 vw injury   Final Result by Slade Gordon MD (03/08 1643)      No acute osseous abnormality or significant change from priors  Salt-and-pepper appearance of the skull is partially accentuated by the image processing /sharpening algorithm, though prior head CT suggests this is a true finding  Correlate for clinical/laboratory features of hyperparathyroidism  Workstation performed: [id number]         XR chest portable   Final Result by Rosamaria Ramirez MD (03/07 1650)      No acute cardiopulmonary disease  Workstation performed: [id number]         CT head wo contrast   Final Result by Andres Carroll MD (03/07 1358)      Evolving right MCA territory infarcts with associated petechial hemorrhage which is best seen on the recent MRI examination  There is no discrete parenchymal hematoma                    Workstation performed: [id number]         XR knee 1 or 2 vw left   Final Result by Teresa Glover MD (03/07 1537)      No acute osseous abnormality              Workstation performed: [id number]         FL barium swallow video w speech    (Results Pending)     See above as well    Laboratory: Labs reviewed  Results from last 7 days   Lab Units 03/07/23  1437 03/06/23  1224 03/02/23  0839   HEMOGLOBIN g/dL 14 7 13 1 13 5   HEMATOCRIT % 46 1 41 5 41 3   WBC Thousand/uL 9 35 7 48 9 83     Results from last 7 days   Lab Units 03/07/23  1437 03/06/23  1039 03/02/23  0839   BUN mg/dL 18 15 14   SODIUM mmol/L 137 138 138   POTASSIUM mmol/L 3 9 3 9 4 2   CHLORIDE mmol/L 104 108 108   CREATININE mg/dL 1 19 1 07 0 91   AST U/L 21 25  --    ALT U/L 18 18  --             Drug regimen reviewed, all potential adverse effects identified and addressed:    Scheduled Meds:  Current Facility-Administered Medications   Medication Dose Route Frequency Provider Last Rate   • acetaminophen  650 mg Oral Q6H PRN Klaudia Fonseca DO     • albuterol  2 puff Inhalation Q6H PRN Klaudia Fonseca DO     • aluminum-magnesium hydroxide-simethicone  30 mL Oral Q4H PRN Klaudia Fonseca DO     • apixaban  5 mg Oral BID Klaudia Fonseca DO     • atorvastatin  40 mg Oral QPM Klaudia Fonseca DO     • bisacodyl  10 mg Rectal Once Su Stone MD     • buPROPion  100 mg Oral BID Klaudia Fonseca DO     • cefpodoxime  200 mg Oral BID With Meals Klaudia Fonseca DO     • cinacalcet  30 mg Oral Daily With Breakfast Anika Hull MD     • DULoxetine  60 mg Oral BID Klaudia Fonseca DO     • gabapentin  100 mg Oral BID Su Stone MD     • glycerin-hypromellose-  1 drop Both Eyes Q4H PRN Klaudia Fonseca DO     • hydroxychloroquine  200 mg Oral Once per day on Mon Tue Wed Thu Fri Klaudia Fonseca DO     • hydroxychloroquine  400 mg Oral Once per day on Sun Sat Klaudia Fonseca DO     • lamoTRIgine  150 mg Oral HS Klaudia Fonseca DO     • lidocaine  1 patch Topical Daily Kobi Camarena MD     • LORazepam 0 25 mg Oral BID PRN Roxana Alan MD     • melatonin  3 mg Oral HS LAURA Marlow     • menthol-methyl salicylate   Apply externally 4x Daily PRN Kristen Mendes MD     • metoprolol succinate  25 mg Oral BID LAURA Keller     • morphine  7 5 mg Oral Q6H PRN Roxana Alan MD     • ondansetron  4 mg Oral Q6H PRN Raynold Clunes, DO     • polyethylene glycol  17 g Oral Daily Raynold Clunes, DO     • polyethylene glycol  17 g Oral Daily PRN Raynold Clunes, DO     • [START ON 3/10/2023] predniSONE  20 mg Oral Daily LAURA Keller     • predniSONE  40 mg Oral Daily LAURA Keller     • senna-docusate sodium  1 tablet Oral BID Raynold Clunes, DO     • sodium chloride  1 spray Each Nare Q1H PRN Paula Rodriguez MD         Chief Complaints:  Rehab follow-up     Subjective: On eval, patient denies SOB, CP, fever, chills, increased cough, worsening strength or sensation  She reports improved R toe pain, L knee and hip pain  She notes some mild L shoulder pain stable  Patient reports improving dysuria  She denies other complaints  ROS: A 10 point ROS was performed; negative except as noted above         Physical Exam:  Temp:  [97 9 °F (36 6 °C)-98 3 °F (36 8 °C)] 98 °F (36 7 °C)  HR:  [] 104  Resp:  [18-20] 18  BP: ()/(46-67) 128/65  SpO2:  [94 %-96 %] 95 %    GEN:  Lying in bed in NAD   HEENT/NECK: Normocephalic, atraumatic, moist mucous membranes   CARDIAC: Regular rate rhythm, no murmers, no rubs, no gallops  LUNGS:  clear to auscultation, no wheezes, rales, or rhonchi  ABDOMEN: Soft, non-tender, non-distended, normal active bowel sounds  EXTREMITIES/SKIN:  R GT MTPJ with improved erythema and tenderness; good ROM; L lateral ankle with superficial partial thickness wound without signs of infection; L shoulder ROM near full; L knee and hip ROM full without pain;   NEURO:   MENTAL STATUS: Less tired, able to follow commands; orientation intact, CN II-XII: L facial droop and Strength/MMT:  Stable mild L sided weakness; R side intact  PSYCH:  Affect:  Euthymic       ** Please Note: Fluency Direct voice to text software may have been used in the creation of this document  **    60 minutes or greater spent for this encounter which included a combination of face-to-face time with Dolly Crane and non-face-to-face time which in part specifically includes management of CVA, skin, MSK, etc   Face-to-face time included extended discussion with patient regarding current condition, medical history, mood, medical/rehabilitation management, and disposition  Non face-to-face time included coordination of care with patient's co-managing AP and/or physician(s) thru communication and review of their recent documentation as well as reviewing vitals, bowel/bladder function, recent labs, and notes from therapy, CM, and nursing  I personally performed the required components and examined the patient myself in person on 3/8/23

## 2023-03-09 NOTE — PROGRESS NOTES
03/09/23 1430   Pain Assessment   Pain Assessment Tool 0-10   Pain Score No Pain   Restrictions/Precautions   Precautions 1:1;Aspiration;Bed/chair alarms;Cognitive; Fall Risk;Contact/isolation;O2;Supervision on toilet/commode;Visual deficit   Comprehension   Comprehension (FIM) 4 - Understands basic info/conversation 75-90% of time   Expression   Expression (FIM) 4 - Expresses basic info/needs 75-90% of time   Social Interaction   Social Interaction (FIM) 5 - Interacts appropriately with others 90% of time   Problem Solving   Problem solving (FIM) 3 - Solves basic problmes 50-74% of time   Memory   Memory (FIM) 3 - Recognizes, recalls/performs 50-74%   Speech/Language/Cognition Assessmetn   Treatment Assessment Family training/education was completed today w/ pt, pt's dtr, Jaja Price and pt's brother, Deidre Shrestha  Primary focus of family training was geared towards education and review given pt's current dysphagia which has worsened since her current admission for Stroke  Of note, pt's dtr was able to confirm prior dysphagia and completion of VFSS studies in the past as well as f/u w/ GI services at [facility]  SLP was aware of review given pt's cognitive linguistic skills in OT family training, to where SLP provided brief f/u in regards to overall deficits ranging from decreased attention, visual perceptual deficits, decreased ST recall, decreased problem solving, reasoning, sequencing, slow processing, fatigue and fluctuating awareness of current events (orientation throughout the day) needing 1:1 supervision currently due to decreased safety  Of note, SLP did touch base in education during dysphagia section as to how due to fluctuating cognitive skills more recently was due to UTI but CANNOT fully r/o the possibility given micro-aspiration as it was observed that pt silently aspirates thin liquids via all modalities   Family verbalizing understanding given this education from OT session, where SLP primarily focused on review of VFSS which was completed today  Dysphagia/Aspiration Risk/Esophageal Deficits  SLP providing pt and dtr in depth review of VFSS, showing the VFSS to dtr and engaging in open ended discussion/conversation about pt's anatomy, overall functioning during pt's swallow processing, as well as the deficits which occurred during various consistencies which were used given assessment  SLP providing dtr and pt in depth review given the overt functional deficits which present both orally and pharyngeally and the resultant effects given these deficits  SLP stressing to pt, dtr and brother about how pt currently is at [facility] aspiration risk due to the silent aspiration events which occurred given NT by straw, thins by tsp, cup and straw  SLP did state that while NT by cup did result in penetration, when taking small single sips, improvement was observe w/ lessening overt potential for penetration/aspiration  As pt's dtr observed the VFSS, dtr was able to verbalize awareness given deficits which are occurring as well as observing how weak and incoordinated pt's swallow function presents at this time  Dtr did ask about means to improve swallow, which SLP did provide education about use of swallow exercises as well as the possibility of using NMES in attempts to strengthen swallow  However, pt did verbalize that she doesn't want to do that at this time, but SLP will provide pt more education in upcoming session in regards to possibility of initiation  SLP did educate pt and pt's family about the difficulty which was observed w/ soft solid trials, noting that overall effectiveness in mastication is decreased, where pt does swallow soft solids whole and the piece sits in valleculae and due to overall weakness of swallow, could be a choking hazard for pt currently   SLP provided education about how pureed diet is currently the safest and focus will not in this short term be towards advancement of diet, but overall tolerance and increased intake w/ puree diet at this time  Primarily focus will be towards tolerance of liquids  SLP also educating pt's family on how ice chips will be provided after oral care w/ suction at meals for salience for pt at this time  So in addition to the moderate-severe oropharyngeal dysphagia which pt presents at this time, SLP also providing pt and family education on concerns regarding pt's ESOPHAGEAL stage, where upon visual scan after VFSS was completed, scan was completed  Pt observed to have slower motility given puree, soft solids noted in upper esophagus  Pt also exhibited retropulsion of food stuff at the mid-esophagus level and minimal clearance over time of this food which remained in mid to lower esophagus  SLP had pt use NT wash which did push held up food in mid-lower esophagus through LES  Esophagus does appear to be tortuous as well which can impact clearance of food/liquid  Family did report that pt was following a GI services and dtr did provide SLP w/ GI MD  However, SLP did provide education to family that due to this difficulty in esophageal motility, it explains how pt does not have increased appetite, does feel full quickly and is at risk for bottom up reflux IF not full upright or OOB for meals  Pt's brother and pt did speak to how there is a family h/o esophageal deficits, but to SLP's concern, if the LES is not opening appropriately to clear food, then increased chances of reflux can occur  SLP did educate family about how positioning for meals (OOB at this time) is most optimal for decreasing possible reflux sxs, but also there is the possibility of smaller more frequent meals throughout the day  Pt and family agreeable with the outlined plan which SLP had presented: for now, 1- focusing diet to be puree, as pt is safest and most intake can occur given this option  2- Provide smaller meals but more frequently throughout the day  3- Establishing tolerance of NT liquids by cup only   4- Initiating thin liquids under direct SLP supervision only  5- Ensuring carryover given swallow strategies during meals  6- Introducing swallow exercises and further discussion in regard to possible initiation of NMES  7- Recommend GI consult due to esophageal deficits observed on VFSS as well as dietician consult if not already placed  All questions were answered at end of family training session at this time  SLP Therapy Minutes   SLP Time In 1430   SLP Time Out 3510   SLP Total Time (minutes) 45   SLP Mode of treatment - Individual (minutes) 45   SLP Mode of treatment - Concurrent (minutes) 0   SLP Mode of treatment - Group (minutes) 0   SLP Mode of treatment - Co-treat (minutes) 0   SLP Mode of Treatment - Total time(minutes) 45 minutes   SLP Cumulative Minutes 510   Therapy Time missed   Time missed?  No

## 2023-03-09 NOTE — ASSESSMENT & PLAN NOTE
Much improved on prednisone  Uric acid wnl   R 1st MTPJ redness and focal tenderness without significant edema or skin breakdown   - IM feels most c/w gout and have ordered prednisone - taper thru 3/12  - Monitor for improvement or non-improvement and other etiologies as well closely

## 2023-03-09 NOTE — ASSESSMENT & PLAN NOTE
On lateral ankle possibly occurred at time of fall, reviewed wound getting SilvaSorb examined today on 3/15 with nursing  - Wound care c/s  1  Cleanse L lateral ankle wound with NSS, pat dry, and apply silvasorb gel to the wound bed  Cover with Allevyn foam dressing  Isacc dressing with T  Change every day and as needed for soilage/dislodgement  2  Preventative nursing skin care orders placed  3   Pressure relief, turning and repositioning with foam offloading wedges and waffle cushion to the care  Prevalon boot

## 2023-03-09 NOTE — PROCEDURES
Speech Pathology Videofluoroscopic Swallow Study (VFSS/VBS/MBSS)      Patient Name: Nikki Colon    VFBRI'A Date: 3/9/2023     Problem List  Principal Problem:    Acute CVA (cerebrovascular accident) [facility])  Active Problems:    Chronic pain syndrome    Asthma    Seronegative arthropathy of multiple sites ([facility] )    CHF (congestive heart failure) ([facility] )    Osteoporosis    Cervical spondylosis    Fall    Bipolar depression ([facility] )    HTN (hypertension)    Hypercalcemia    Discoloration of skin of foot    History of bacteremia    Obstructive sleep apnea    History of stroke    UTI (urinary tract infection)    AMS (altered mental status)    Gout of foot    Dysphagia    Wound of left ankle      Past Medical History  Past Medical History:   Diagnosis Date   • Anxiety    • Asthma    • Bipolar depression ([facility] )    • Chronic narcotic dependence ([facility] )    • Chronic pain    • Depression    • Enterovirus heart infection    • SHAR (obstructive sleep apnea)    • Osteoarthritis    • Peripheral neuropathy    • Plantar fasciitis of right foot    • Senile osteoporosis    • Seronegative arthropathy of multiple sites ([facility] )    • Undifferentiated connective tissue disease ([facility] )        Past Surgical History  Past Surgical History:   Procedure Laterality Date   • BREAST IMPLANT     • FIXATION KYPHOPLASTY LUMBAR SPINE     • HYSTERECTOMY     • INCONTINENCE SURGERY N/A    • IR STROKE ALERT  2/12/2023   • NASAL SEPTOPLASTY W/ TURBINOPLASTY N/A    • RECTAL PROLAPSE REPAIR N/A    • REPAIR RECTOCELE     • RHINOPLASTY N/A          Assessment Summary:    Pt presents with moderate-severe oropharyngeal dysphagia where pt demonstrated weakened lip seal noted to be mildly decreased around tsp, leading to inconsistent trace anterior loss on initial tsp presentations but likely due to increased xerostomia which was observed   Bolus control of thin and NT liquids by tsp, cup and straw was observed to be decreased, leading to premature spill to pharynx prior to swallow  Bolus manipulation is delayed given puree noting spill to pharynx prior to swallow  Mastication was PROLONGED with soft solids (both banana and peaches) administered today  Bolus formation given soft solids was observed to be decreased to where despite prolonged mastication given these textures overall effectiveness in breakdown/formulation was decreased noting that pt did swallow banana >peaches more so whole  Pt did have an episode of brief nasal regurgitation x1 w/ NT by tsp  Transition between with oral and pharyngeal stages was moderately delayed w/ puree, NT and thin textures administered  Hyolaryngeal excursion was decreased given all consistencies  It was observed that BOT retractions is weaker which it was observed that pt did have fairly consistent retention given puree and soft textures  Also observed during assessment was weakened/decreased pharyngeal constriction across textures which lead to moderate amount of pharyngeal retention (more vallecular vs pyriform sinus retention)  Pt did also present w/ trace to minimal PPW w/ all consistencies observed  While pt's epiglottis was inverting during swallow, overall timing given airway protection during swallows more w/ NT and thins was decreased  Pt was observed w/ sluggish movement/return of epiglottis intermittently during assessment  It was observed that pt did have a cricopharyngeal prominence around to C5-C6 level which did NOT impinge passage of boluses  Unfortunately, pt did have increased laryngeal penetration which ranged from high level but then deep to the level of the vocal cords as well as then mostly silent aspiration events w/ thin liquids but also NT by straw  Refer to below for observations for full report for specific event w/ penetration and aspiration  Note: Images are available for review in PACS as desired      Recommendations:   Recommended Diet:  puree/level 1 diet and nectar thick liquids by CUP ONLY-NO STRAWS  Recommended Form of Medications: whole with puree   Aspiration precautions and compensatory swallowing strategies: upright posture, only feed when fully alert, slow rate of feeding, small bites/sips, no straws, effortful swallow, cough every 4-5 bites/sips, quiet environment (tv off, limit talking, door closed, etc ), alternating bites and sips, OOB for ALL meals, MUST be AWAKE/ALERT for meals  Oral care with suction w/ after ALL meals  Once oral care was completed, pt is allowed ICE CHIPS between meals  However, pt MUST be FULLY upright when consuming ice chips  Consider referral to: GI, dietician    SLP Dysphagia therapy recommended: yes    Results Reviewed with: patient, RN, MD- Dr Alejandro Anne, family (dtr, [place] and brother, Adell), PT, OT and pt's 1:1 sitter     Pt/Family Education: initiated  Pt and caregivers would benefit from continued education  General Information;  Pt is a 75 y o  female with a PMH remarkable for asthma, anxiety, bipolar depression, chronic narcotic use, previous enteroviral infection of the heart, seronegative arthropathy of multiple joints, undifferentiated connective tissue disorder, obstructive sleep apnea on home CPAP who presented to the [facility] on 2/22 for altered mental status  According to the patient's daughter on chart review, the patient's mental status had been declining for the prior 48 hours to admission  She did receive Narcan which improved her mental status slightly  Of note she had a recent stroke with revascularization on 2/12/2023 with Dr Allison Petit  She was seen by neurology with overall good strength except mildly weak in the lower extremities with brisk reflexes on the left and a positive Haroon's  At this time is felt to be potentially related to polypharmacy in the setting of chronic pain meds versus seizure in the setting of a recent stroke    She was initiated on 81 mg of aspirin daily with a plan of potentially transitioning to Eliquis, plan for EEG  CT of the head was negative  MRI was completed showing a new small acute/recent interval lacunar infarction in the right cerebral pedun there is jacinta  She was started on Eliquis  Nephrology was consulted as well for hypercalcemia  The patient was evaluated by the Rehabilitation team and deemed an appropriate candidate for comprehensive inpatient rehabilitation and admitted to the [facility] on 2/28/2023  Current concerns for dysphagia include fluctuating aspiration sxs which pt does currently present given dysphagia tx sessions  Trials of softer solids (level 2 items) was not been highly successful as pt does exhibit difficulty in ability to swallow these items  Additionally pt exhibits fluctuating tolerance of thin liquids by cup which pt will demonstrate cough response, but does increase given straw sip trials  Due to pt's noted changes in cognition, noting increased moments of confusion given meals, this also play a factor in overall safety given swallow function as well as increasing risk of aspiration given current diet of puree and thin liquids by cup only  Of note, pt does have h/o dysphagia, to where pt has undergone prior VFSS studies as reported below:     Previous VBS and LVH:   VBS 5/26/2021: Overall there was prominent cricopharyngeal impression  Thin liquids by cup: Aspiration with cough Thin liquids by cup, small sip with oral prep: Transient penetration x1 and silent aspiration x1  Nectar consistency by cup: Transient penetration  Puree: Transient penetration  Mild vallecular and piriform sinus retention  Mixed consistency: Silent aspiration of fluid  Moderate vallecular retention of fruit  Mixed consistency, small-volume: Transient penetration and vallecular retention  Nectar wash by cup: Silent aspiration of retained fluid  Nectar consistency by cup: Transient penetration  IMPRESSION: Episodes of aspiration and penetration as above   Please see speech pathology report for recommendations       VBS 10/30/2018: Deep penetration to the level of the cords with thin liquids  Mild to moderate penetration, the bedside is seen with nectar consistency  Transient penetration with the liquid component of mixed solid consistency  Please see detailed speech and swallow report  Due to these noted observations, as well as know h/o dysphagia as per above results given prior VFSS results, a VFSS was recommended to assess oropharyngeal stage swallowing skills at this time  Pt was viewed in lateral position and was given trials of pureed, soft moist (sliced banana,canned peaches) as well as nectar (mildly thick) and thin liquid  Oral stage:  Pt presented with moderate oral stage dysphagia  Lip seal is noted to be mildly decreased around tsp, leading to inconsistent trace anterior loss on initial tsp presentations but likely due to increased xerostomia which was observed  Bolus control of thin and NT liquids by tsp, cup and straw was observed to be decreased, leading to premature spill to pharynx prior to swallow  Bolus manipulation is delayed given puree noting spill to pharynx prior to swallow  Mastication was PROLONGED with soft solids (both banana and peaches) administered today  Bolus formation given soft solids was observed to be decreased to where despite prolonged mastication given these textures overall effectiveness in breakdown/formulation was decreased noting that pt did swallow banana >peaches more so whole  Pt did have an episode of brief nasal regurgitation x1 w/ NT by tsp  Pharyngeal stage:  Pt presented with moderate-severe pharyngeal dysphagia  Transition between with oral and pharyngeal stages was moderately delayed w/ puree, NT and thin textures administered  Hyolaryngeal excursion was decreased given all consistencies   It was observed that BOT retractions is weaker which it was observed that pt did have fairly consistent retention given puree and soft textures  Also observed during assessment was weakened/decreased pharyngeal constriction across textures which lead to moderate amount of pharyngeal retention (more vallecular vs pyriform sinus retention)  Pt did also present w/ trace to minimal PPW w/ all consistencies observed  While pt's epiglottis was inverting during swallow, overall timing given airway protection during swallows more w/ NT and thins was decreased  Pt was observed w/ sluggish movement/return of epiglottis intermittently during assessment  It was observed that pt did have a cricopharyngeal prominence around to C5-C6 level which did NOT impinge passage of boluses  Unfortunately, pt did have increased laryngeal penetration which ranged from high level but then deep to the level of the vocal cords as well as then mostly silent aspiration events w/ thin liquids but also NT by straw  Refer to below for observations       Penetration/Aspiration Events:  High penetration w/ puree  High penetration (coating the epiglottis) after banana  Deep penetration to the level of the vocal cords w/ thins by tsp x1  Trace silent aspiration to the level of the vocal cords near the anterior commisure w/ thins by tsp x1  Penetration to the level of the vocal cords w/ flash aspiration below the vocal cords when eliciting 2nd swallow w/ NT by cup  Fair amount of massimo silent aspiration of NT by straw  Smaller amount of silent aspiration of thins by cup sip  Penetration w/ eventual silent aspiration of thin liquids by straw over consecutive sips      Strategies and Efficacy:  1- double swallows: does not effectively eliminate significant pharyngeal retention  2- throat clear/cough (mostly prompted): inconsistent in ability to clear penetration/aspriation   3- modified supraglottic swallow (cough-reswallow): noted to be inconsistent in clearance of penetration/aspiration    Aspiration Response and Efficacy:   MOST penetration/aspiration events were SILENT in nature  Delayed spontaneous cough did occur after one aspiration event    Esophageal stage:  Esophageal screening was completed  Pt was observed to have slower motility given puree, soft solids noted in upper esophagus  However, at end of assessment, esophageal scan was completed to where it was observed that pt exhibited retropulsion of food stuff at the mid-esophagus level and minimal clearance over time of this food which remained in mid to lower esophagus  SLP had pt use NT wash which did push held up food in mid-lower esophagus through LES  Esophagus does appear to be tortuous as well which can impact clearance of food/liquid

## 2023-03-09 NOTE — ASSESSMENT & PLAN NOTE
- Current diet: Modified - D1, NTL after failed VBS, GI consulted, plan for esophogram, protonix added  - Recommend VBS study-VBS completed on 3/9 with evidence for esophageal dysmotility and dysphagia recommending puréed with nectar thick, okay for ice chips  - SLP evaluate and treat decline in swallowing   - Aspiration precautions; HOB completely upright when eating, 100% supervision, monitor for pocketing  - Hold oral intake if patient too confused or lethargic and notify MD   - Oral care with meals and bedtime  - Ensure adequate hydration  - Nutrition consult to assist with management   - Rehab nursing and staff to ensure safe and appropriate feeding to decrease risk of aspiration     - Rehab physician oversight dysphagia management and risks associated with it  - Monitor for appropriate fluid and nutrition intake

## 2023-03-09 NOTE — PROGRESS NOTES
03/09/23 1100   Pain Assessment   Pain Assessment Tool 0-10   Pain Score No Pain   Restrictions/Precautions   Precautions 1:1;Aspiration;Bed/chair alarms;Cognitive; Fall Risk;Impulsive;Contact/isolation;O2;Supervision on toilet/commode;Visual deficit   Comprehension   Comprehension (FIM) 3 - Understands basic info/conversation 50-74% of time   Expression   Expression (FIM) 4 - Expresses basic info/needs 75-90% of time   Social Interaction   Social Interaction (FIM) 5 - Interacts appropriately with others 90% of time   Problem Solving   Problem solving (FIM) 3 - Solves basic problmes 50-74% of time   Memory   Memory (FIM) 3 - Recognizes, recalls/performs 50-74%   Speech/Language/Cognition Assessmetn   Treatment Assessment Pt alert, awake and participatory, seen sitting upright in bed for the duration of today's cognitive linguistic tx session  Pt still with c/o nausea holding bucket in bed w/ her, nrsing was notified and provided her w/ medication prior to tx session  Pt mentation improved today although fatigued, rx she is not sleeping well  Pt w/ improved remote memory from previous session, recalled visitors and the location of the case for her glasses (that was out of sight) as well as oriented to [address] [place] [address] and year unable to recall date w/out use of external visual aid  Pt also w/ improved awareness inquiring about the results of her VFFS completed this AM, the diet implications and plan going forward  SLP educated pt on the results and answered all pt inquiries, explained she will inform daughter of results in FT this afternoon  Pt receptive and agreeable to all diet modifications and recommendations at this time  Cognitive linguistic tasks focused on basic categorization to lessen cognitive demand d/t pt's c/o nausea and fatigue  Pt completed verbal concrete categorization task where pt provided w/ visual of four categories and concrete noun w/ accuracy of 17/20, increasing to 20/20 w/ choice of two   Pt then engaged in similar abstract categorization task given a visual of four abstract categories and more complex nouns w/ accuracy of 15/20 increasing to 17/20 w/ support (repetition, verbal cues)  Of note, pt verbalizing the correct answer then pointing to incorrect category during activity suspect d/t visual deficits  Pt demo reduced mental flexibility w/ dec accuracy on abstractcategorization activity and only minimally receptive to cues for error responses  Pt requesting help w/ accessibility to her cell phone stating it is difficult to unlock using her passcode d/t the nature of her physical impairments  Student SLP suggested using face ID to ease burden, pt receptive to this and gave verbal permission for student SLP to attempt to change this for her  Student SLP unsuccessful in making this change for her as her email password is needed that she does not recall, pt stated her daughter has it and asked for it to be brought up to her daughter  Student SLP told pt she would rx it to SLP so that it can be discussed w/ daughter during family training this PM  At this time, pt continues to benefit from skilled ST services targeting cognitive linguistic skills to minimize caregiver burden upon d/c  SLP Therapy Minutes   SLP Time In 1100   SLP Time Out 1130   SLP Total Time (minutes) 30   SLP Mode of treatment - Individual (minutes) 30   SLP Mode of treatment - Concurrent (minutes) 0   SLP Mode of treatment - Group (minutes) 0   SLP Mode of treatment - Co-treat (minutes) 0   SLP Mode of Treatment - Total time(minutes) 30 minutes   SLP Cumulative Minutes 405   Therapy Time missed   Time missed?  No

## 2023-03-09 NOTE — PROGRESS NOTES
Physical Medicine and Rehabilitation Progress Note  Lencho Smith 75 y o  female MRN: [id number]  Unit/Bed#: -01 Encounter: [id number]      Assessment & Plan:     Decline in ADLs and mobility: Functional assessment  Transfers and ambulation 60 ft mod assist      * Acute CVA (cerebrovascular accident) [facility])  Assessment & Plan  CT head 3/7 - Evolving right MCA territory infarcts with associated petechial hemorrhage which is best seen on the recent MRI examination  There is no discrete parenchymal hematoma  - discussed above with radiologist Della - no acute bleeding noted   MRI brain 2/22 - 1  New small acute /more recent interval lacunar infarction in the right cerebral peduncle (series 4, image 14)  2   Extensive multifocal large, previously present right middle cerebral artery infarction which is evolving with superimposed areas of evolving hemorrhage likely related to hemorrhagic transformation of subacute right MCA infarction  No significant   mass effect  3   Mild, chronic microangiopathy   - Continue Eliquis and neuro checks   S/P thrombectomy 2/12 by Dr Jacki Castellanos   Currently on a dysphagia 1 diet with thin liquids  Secondary stroke prophylaxis at this time with Eliquis 5 mg twice daily and discontinuation of aspirin; and statin; optimal BP control   PT, OT, SLP  Consideration for neuropsychology if patient agreeable  Review modifiable risk factors and provide stroke education  Monitor for poststroke pain  Close neuro follow-up as patient has had multiple strokes in the last 2 months    AMS (altered mental status)  Assessment & Plan  3/7 Patient with some sleepiness at times apparently last several days with poor activity tolerance and now s/p fall - appears back to recent baseline on follow-up eval   CT head 3/7 - Evolving right MCA territory infarcts with associated petechial hemorrhage which is best seen on the recent MRI examination  There is no discrete parenchymal hematoma   - discussed above "with radiologist Fatemeh Biswas - no acute bleeding noted   - Ensure adequate nutrition and hydration with altered diet and sub-optimal intake BUN/Cr acceptable   - Continue Eliquis and neuro checks, monitor vitals scout  - Comgmt with IM team   - Patient afebrile; CBC wnl; LFTs wnl; Ammonia wnl  - Continue Abx of UTI   - Calcium elevated > optimal mgmt per IM/endo  - Hx of SHAR - continue 2L at night O2 with spot checks - refusing CPAP  - Hold standing long-acting MS Contin 15mg Q12H and transition to MS IR 7 5mg Q6H PRN   - Gabapentin decreased recently to 200mg BID > recommend decreasing to 100mg BID            Fall  Assessment & Plan  3/7 early morning - per report \"unwitnessed FALL; Per RN patient set off bed alarm, but has already fallen onto her left side  Confused and wanting to wash her face  Once more awake, mentation improved  \"  - CTH evolving infarct without new bleeding per rads  - L knee mild pain but good ROM without significant edema or instability - XR unremarkable - monitor   - Mild L hip pain but good ROM and can adequately WB - obtain L hip/pelvis XR - monitor  - Some L shoulder pain - some mildly decreased AROM, +Muñoz -  obtain L shoulder XR - monitor    - Subtle neck pain but full ROM without radicular complaints or focal weakness, sensory changes - C spine XR - monitor  - Optimal mgmt with wakefulness/AMS - continue 2L at night O2 with spot checks - refusing CPAP  - Recommend 1:1 for safety     Asthma  Assessment & Plan  3/7 afternoon - patient had questionable desat with therapy but had full improved mentation and was in no resp or other distress at that time  - Comgmt with IM who also evaluated patient at this time    - She historically has had difficulty obtaining O2 from finger tips at times - when pulse ox moved to toe she had good oxygenation on RA-2L - will continue 2L for now  - CXR no acute findings - bibasilar scarring   - Continue use of albuterol inhaler 2 puffs every 6 hours as " "needed    Gout of foot  Assessment & Plan  R 1st MTPJ redness and focal tenderness without significant edema or skin breakdown   - IM feels most c/w gout and have ordered prednisone  - Monitor for improvement or non-improvement and other etiologies as well closely     Hypercalcemia  Assessment & Plan  Ca up to 12 0 on 3/7   Nephrology had been following, nonsuppressed PTH with concerns for primary hyperparathyroidism - they defer to endo on mgmt   Endocrine consult   Corrected calcium 11 7  \"SPEP-no monoclonal bands, UPEP- no monoclonal bands, PTH RP less than 2, 25-hydroxy vitamin D 41 7, 1,25 hydroxy vitamin D 23 7 low, Phos 2 5 low, PTH 65 4   Assessment/Plan:  1  Hypercalcemia: Suspected to be on the basis of primary hyperparathyroidism given inappropriately high end of normal PTH  Calcium previously was mildly elevated but has increased above 12 more recently  Patient does have surgical indications in the setting of calcium level as well as osteoporosis  If reasonable from nephrology standpoint, while in the acute rehab center would consider obtaining CT parathyroid for localization so this is available if surgery is considered appropriate as outpatient  If corrected calcium does trend above 12, start Sensipar 30 mg daily and monitor corrected calcium level  We will continue to follow    Status post a CT of the chest abdomen and pelvis with no overt malignancy\"  - Patient has allergy to contrast - discussed with endo fellow  - Start of Sensipar at discretion of endo     UTI (urinary tract infection)  Assessment & Plan  UA last week with culture growing greater than 100,000 CFU Morganella and Serratia  Started on Ciprofloxacin but transitioned to Cefpodixime per prior providers with plan to continue into 3/10   Still with slight dysuria     History of stroke  Assessment & Plan  Patient with recent admission in early 2023 for right MCA CVA with petechial hemorrhage and residual left-sided weakness and " dysarthria  Patient had mechanical thrombectomy with Dr Summer Zabala on 2/12    Obstructive sleep apnea  Assessment & Plan  Continue CPAP use nightly  Setting of FiO2 to 30%  Okay to use oxygen as she is refusing CPAP use    History of bacteremia  Assessment & Plan  Unclear etiology initially of whether her altered mental status was related to stroke versus polypharmacy versus opioid overdose versus infection versus seizure  1/2 micrococcus with a second set staph coag negative and followed by infectious disease after course of antibiotic treatment  Has been off of antibiotics and repeat blood cultures negative to date, ID felt may be contaminant    Discoloration of skin of foot  Assessment & Plan  Per daughter and patient patient's foot will become blue or red and change colors frequently  No evidence for infectious etiology and had a lower extremity arterial duplex with no evidence of any significant arterial occlusive disease  May be related to the seronegative spondyloarthropathies  Likely the source of of the inaccurate and inconsistent pulse oximetry readings in the hands as well    HTN (hypertension)  Assessment & Plan  Currently on metoprolol succinate 25 mg twice daily  Plan per cardiology did not add an ARB in the setting of heart failure however as blood pressure allows    Bipolar depression ([facility] [address] )  Assessment & Plan  Currently on Wellbutrin 100 mg twice daily, Lamictal 150 mg nightly, and Cymbalta 60 mg twice daily  Consistent with home regimen  Continue monitoring for stability, mood lability    Consider neuropsychology consultation if patient agreeable    Osteoporosis  Assessment & Plan  History of an L1 compression fracture status post kyphoplasty  Was previously on Prolia has not been on since 2020  Outpatient referral to endocrine    CHF (congestive heart failure) ([facility] [address] )  Assessment & Plan  Wt Readings from Last 3 Encounters:   03/01/23 57 6 kg (126 lb 15 8 oz)   02/16/23 62 6 kg (138 lb)   02/12/23 67 9 kg (149 lb 11 1 oz)       Cardiomyopathy with reduced ejection fraction as low as 10 to 15% back in 2004 but has improved since and most recently was 25 to 30% on last admission  CXR 3/7 unremarkable; does not appear volume overloaded   Patient was offered LifeVest but declined  On metoprolol succinate 25 mg twice daily, with plan to add an ARB as her blood pressure would allow  Strict I's and O's and daily weights  Follows with Dr Nahid Quinones at [facility]      Seronegative arthropathy of multiple sites [facility])  [address] with rheumatology and is on Plaquenil 200 mg Monday through Friday and 400 mg on Saturday Sunday  Continue home regimen and follow-up with rheumatology on discharge    Chronic pain syndrome  Assessment & Plan  History of chronic pain secondary to compression fractures in her thoracic spine and chronic rib fractures   Patient with over sleepiness at times   · Hold standing long-acting MS Contin 15mg Q12H and transition to MS IR 7 5mg Q6H PRN   · Gabapentin decreased recently to 200mg BID > recommend decreasing to 100mg BID   · Cymbalta 60 milligrams twice daily  · Acetaminophen 650 mg every 6 hours as needed  · [facility] but per family due to concerns for polypharmacy and additional opioids beyond her morphine has not been on an acute care since 2/16  · Lidoderm and Bengay  Aqua K also ordered with instructions not to place over same area  CODE: Level 1: Full Code    Restrictions include: Fall precautions    Objective: Allergies per EMR  Diagnostic Studies: Reviewed     XR chest portable   Final Result by Zita Chaves MD (03/07 5346)      No acute cardiopulmonary disease                    Workstation performed: [id number]         CT head wo contrast   Final Result by Bradley Sosa MD (03/07 1389)      Evolving right MCA territory infarcts with associated petechial hemorrhage which is best seen on the recent MRI examination  There is no discrete parenchymal hematoma  Workstation performed: [id number]         XR knee 1 or 2 vw left   Final Result by Jill Smith MD (03/07 1537)      No acute osseous abnormality              Workstation performed: [id number]           See above as well    Laboratory: Labs reviewed  Results from last 7 days   Lab Units 03/07/23  1437 03/06/23  1224 03/02/23  0839   HEMOGLOBIN g/dL 14 7 13 1 13 5   HEMATOCRIT % 46 1 41 5 41 3   WBC Thousand/uL 9 35 7 48 9 83     Results from last 7 days   Lab Units 03/07/23  1437 03/06/23  1039 03/02/23  0839   BUN mg/dL 18 15 14   SODIUM mmol/L 137 138 138   POTASSIUM mmol/L 3 9 3 9 4 2   CHLORIDE mmol/L 104 108 108   CREATININE mg/dL 1 19 1 07 0 91   AST U/L 21 25  --    ALT U/L 18 18  --           See above as well   Drug regimen reviewed, all potential adverse effects identified and addressed:    Scheduled Meds:  Current Facility-Administered Medications   Medication Dose Route Frequency Provider Last Rate   • acetaminophen  650 mg Oral Q6H PRN Sonia Danicacci, DO     • albuterol  2 puff Inhalation Q6H PRN Sonia Waldemari, DO     • aluminum-magnesium hydroxide-simethicone  30 mL Oral Q4H PRN Sonia Waldemari, DO     • apixaban  5 mg Oral BID Sonia Danicacci, DO     • atorvastatin  40 mg Oral QPM Sonia Medeirosi, DO     • bisacodyl  10 mg Rectal Once Amira Pascual MD     • buPROPion  100 mg Oral BID Sonia Waldemari, DO     • cefpodoxime  200 mg Oral BID With Meals Sonia Waldemari, DO     • DULoxetine  60 mg Oral BID Sonia Waldemari, DO     • gabapentin  100 mg Oral BID Amira Pascual MD     • glycerin-hypromellose-  1 drop Both Eyes Q4H PRN Sonia Waldemari, DO     • hydroxychloroquine  200 mg Oral Once per day on Mon Tue Wed Thu Fri Sonia Chaney, DO     • hydroxychloroquine  400 mg Oral Once per day on Sun Sat Sonia Chaney, DO     • lamoTRIgine  150 mg Oral HS Sonia Chaney DO     • lidocaine  1 patch Topical Daily Matilde Durán MD • LORazepam  0 25 mg Oral BID PRN Ryne Caruso MD     • melatonin  3 mg Oral HS Sarah Eller, CRNP     • menthol-methyl salicylate   Apply externally 4x Daily PRN Severino Gates MD     • metoprolol succinate  25 mg Oral BID Georgia Holiday, CRNP     • morphine  7 5 mg Oral Q6H PRN Ryne Caruso MD     • ondansetron  4 mg Oral Q6H PRN Ning Reagin, DO     • polyethylene glycol  17 g Oral Daily Ning Reagin, DO     • polyethylene glycol  17 g Oral Daily PRN Ning Reagin, DO     • [START ON 3/10/2023] predniSONE  20 mg Oral Daily Georgia Holiday, CRNP     • predniSONE  40 mg Oral Daily Georgia Holiday, CRNP     • senna-docusate sodium  1 tablet Oral BID Ning Reagin, DO     • sodium chloride  1 spray Each Nare Q1H PRN Osmel Sylvester MD         Chief Complaints:  Tired     Subjective:     (Patient seen multiple times today)  On initial eval, patient reported some L great toe pain worse with palpation  She notes slight L knee, shoulder, hip and neck pain  Patient denies inability to bend hip, knee, or shoulder but shoulder felt a little tight  She denies radiating pain down arms or legs, acute changes in strength or sensation  She noted mild dysuria without fever, chills, sweats, lightheadedness, headache, nausea, abdominal pain, SOB, CP, or other complaints  On follow-up eval, at time when her finger pulse ox showed low oxygen she denied any CP, SOB, increased cough, fever, chills, or other new complaints  Pulse ox unremarkable when placed on toe  ROS: A 10 point ROS was performed; negative except as noted above         Physical Exam:  03/07/23 0500 98 2 °F (36 8 °C) 88 18 114/69 85 -- -- -- -- None (Room air) Lying     03/07/23 1410 98 2 °F (36 8 °C) 90 16 112/60 -- 96 % 28 -- 2 L/min None (Room air)     GEN:  Sitting in NAD   HEENT/NECK: Normocephalic, atraumatic, moist mucous membranes   CARDIAC: Regular rate rhythm, no murmers, no rubs, no gallops  LUNGS:  clear to auscultation, no wheezes, rales, or rhonchi  ABDOMEN: Soft, non-tender, non-distended, normal active bowel sounds  EXTREMITIES/SKIN:  no calf edema, no calf tenderness to palpation and MSK - L GT MTPJ with some erythema and TTP without skin breakdown or signs of trauma, no drainage - able to bend toe adequately; L knee without significant erythema, edema, AROM/PROM full, L shoulder AROM slightly decreased but PROM full with subtle pain, + Muñoz, no significant TTP; L hip AROM/PROM/IR/ER without significant pain; R knee, R hip, R shoulder unremarkable; neck ROM intact in all planes without pain; subtle TTP at cervical paraspinals bilaterally but not cervical TPs;; no palpable bumps or abrasions on scalp  NEURO:   MENTAL STATUS: Tired but oriented to self, month, year, some sluggish mentation speed at times; adequately arousable able to follow simple commands, CN II-XII: L facial droop otherwise intact, Strength/MMT:  L sided strength about 4- to 4+/5; R sided strength intact and FTN subtle off on L  PSYCH:  Affect:  Euthymic      ** Please Note: Fluency Direct voice to text software may have been used in the creation of this document  **    I personally performed the required components and examined the patient myself in person on 3/7/23  Total time spent:   85 minutes, with more than 50% spent counseling/coordinating care  Counseling includes discussion with patient  which in part specifically includes management of recent CVA, fall, pain, etc specifically related to Luvenia Cea as well as progress in therapies, functional issues observed by therapy staff, and discussion with patient his/her current medical state/wellbeing  Coordination of patient's care was performed in conjunction with Internal Medicine service to monitor patient's labs, vitals, and management of their comorbidities  In addition, this patient was discussed by the interdisciplinary team in weekly case conference today   The care of the patient was extensively discussed with all care providers and an appropriate rehabilitation plan was formulated unique for this patient  Barriers were identified preventing progression of therapy and appropriate interventions were discussed with each discipline   Please see the team note for input from all disciplines regarding barriers, intervention, and discharge planning

## 2023-03-09 NOTE — PROGRESS NOTES
PM&R PROGRESS NOTE:  Rocky Veloz 75 y o  female MRN: [id number]  Unit/Bed#: -01 Encounter: [id number]      Rehabilitation Diagnosis: Impairment of mobility, safety, Activities of Daily Living (ADLs), and cognitive/communication skills due to Stroke:  01 1  Left Body Involvement (Right Brain)    HPI: Rocky Veloz is a 75 y o  female with history of asthma, anxiety, bipolar depression, chronic narcotic use, previous enteroviral infection of the heart, seronegative arthropathy of multiple joints, undifferentiated connective tissue disorder, obstructive sleep apnea on home CPAP who presented to the [address] [place] [address] on 2/22 for altered mental status  According to the patient's daughter on chart review, the patient's mental status had been declining for the prior 48 hours to admission  She did receive Narcan which improved her mental status slightly  Of note she had a recent stroke with revascularization on 2/12/2023 with Dr Stefany Shah  She was seen by neurology with overall good strength except mildly weak in the lower extremities with brisk reflexes on the left and a positive Haroon's  At this time is felt to be potentially related to polypharmacy in the setting of chronic pain meds versus seizure in the setting of a recent stroke  She was initiated on 81 mg of aspirin daily with a plan of potentially transitioning to Eliquis, plan for EEG  CT of the head was negative  MRI was completed showing a new small acute/recent interval lacunar infarction in the right cerebral pedun there is jacinta  She was started on Eliquis  Nephrology was consulted as well for hypercalcemia  The patient was evaluated by the Rehabilitation team and deemed an appropriate candidate for comprehensive inpatient rehabilitation and admitted to the [facility] on 2/28/2023  3:41 PM patient was seen in room with brother at bedside and all questions regarding the acute inpatient rehabilitation program were answered  We also discussed at length course in the hospital and her last hospitalization to verify events and clarify any imaging or laboratory findings  Patient is eager to start her therapy program       SUBJECTIVE: Patient seen in therapy session  She is fully participating and is awake and alert today  Her daughter and brother are available as well  The patient has pain however per [facility] has not asked for the newly initiated short acting morphine doses since for almost 2 days  She was initially transition off of the long-acting to the short acting morphine with a decrease in gabapentin dose due to her somnolence and confusion  At this time she was also started on Sensipar and it is unclear whether it was the medications affecting her cognition or the hypercalcemia that was affecting her or a combination of both however today she is doing much better than she did yesterday after discussion with covering physician  I also went over the medication list from home with the daughter versus what she is on here to have a comparison as she was on significantly higher doses of some of her medications at home  We discussed that we would not increase them here as all of those medications side effects are sedation and confusion at higher doses  Regardless she is participating very well today and was seen ambulating with assistance with use of a gait belt only  All questions were answered for the family with the plan going forward  She did have a video barium swallow study today that was abnormal   The speech therapy team did meet with the family and the patient to discuss the results  They have some recommendations for dietary and GI consultation based on some of the findings which included esophageal dysmotility  I would discussed the case with speech therapy team to further inquire about some of the findings  She was changed to a puréed and nectar thick diet    She will need strong encouragement from everyone to take in the correct amount of fluids with balancing her heart failure  Patient denies fever, chills, nausea, emesis, cough, shortness of breath, diarrhea, or constipation  Sleep was fine, mood stable  Pain is controlled  ASSESSMENT: Stable, progressing      PLAN:    Rehabilitation  • Functional deficits:  Self care and mobility  • Continue current rehabilitation plan of care to maximize function  • Functional update:   o PT: Bed mobility min to moderate assistance, total assistance for transfers  o OT: Total assist for oral hygiene, set up for eating, total assist for bathing, mod to max assist for dressing, total assist for footwear  o SLP: Moderately impaired at the word/phrase/sentence/conversational level  • Estimated Discharge: To be determined in team conference    DVT prophylaxis  • On Eliquis     Pain  • Cymbalta 60 mg twice daily  • Gabapentin 200 mg twice daily  • Morphine MS Contin 15 mg twice daily  • Acetaminophen 650 mg every 6 hours as needed     Bladder plan  • Continent     Bowel plan  • Continent  • Last bowel movement 3/9     Code Status  • Level 1 full code      * Acute CVA (cerebrovascular accident) [facility])  Assessment & Plan  3/8 - neuro exam stable - continue to monitor closely   CT head 3/7 - Evolving right MCA territory infarcts with associated petechial hemorrhage which is best seen on the recent MRI examination  There is no discrete parenchymal hematoma  - discussed above with radiologist Della - no acute bleeding noted   MRI brain 2/22 - 1  New small acute /more recent interval lacunar infarction in the right cerebral peduncle (series 4, image 14)  2   Extensive multifocal large, previously present right middle cerebral artery infarction which is evolving with superimposed areas of evolving hemorrhage likely related to hemorrhagic transformation of subacute right MCA infarction  No significant   mass effect  3   Mild, chronic microangiopathy    S/P thrombectomy 2/12 by Dr Jordi Juárez Currently on a dysphagia 1 diet with thin liquids  Secondary stroke prophylaxis at this time with Eliquis 5 mg twice daily (aspirin d/c'd earlier); and statin; optimal BP control   PT, OT, SLP  Consideration for neuropsychology if patient agreeable  Review modifiable risk factors and provide stroke education  Monitor for poststroke pain  Close neuro follow-up as patient has had multiple strokes in the last 2 months    Wound of left ankle  Assessment & Plan  On lateral ankle possibly occurred at time of fall  - Wound care c/s  1  Cleanse L lateral ankle wound with NSS, pat dry, and apply silvasorb gel to the wound bed  Cover with Allevyn foam dressing  Isacc dressing with T  Change every day and as needed for soilage/dislodgement  2  Preventative nursing skin care orders placed  3   Pressure relief, turning and repositioning with foam offloading wedges and waffle cushion to the care  Prevalon boot     Dysphagia  Assessment & Plan  - Current diet: Modified - D1, thin liquids  - Recommend VBS study-VBS completed on 3/9 with evidence for esophageal dysmotility and dysphagia recommending puréed with nectar thick, okay for ice chips  - SLP evaluate and treat decline in swallowing   - Aspiration precautions; HOB completely upright when eating, 100% supervision, monitor for pocketing  - Hold oral intake if patient too confused or lethargic and notify MD   - Oral care with meals and bedtime  - Ensure adequate hydration  - Nutrition consult to assist with management   - Rehab nursing and staff to ensure safe and appropriate feeding to decrease risk of aspiration     - Rehab physician oversight dysphagia management and risks associated with it  - Monitor for appropriate fluid and nutrition intake      Gout of foot  Assessment & Plan  Much improved on prednisone  Uric acid wnl   R 1st MTPJ redness and focal tenderness without significant edema or skin breakdown   - IM feels most c/w gout and have ordered prednisone - taper thru 3/12  - Monitor for improvement or non-improvement and other etiologies as well closely     AMS (altered mental status)  Assessment & Plan  3/8 Patient mentation fairly stable today - continue to monitor   3/7 Patient with some sleepiness at times apparently last several days with poor activity tolerance and now s/p fall - appears back to recent baseline on follow-up eval   CT head 3/7 - Evolving right MCA territory infarcts with associated petechial hemorrhage which is best seen on the recent MRI examination  There is no discrete parenchymal hematoma   - discussed above with radiologist Della - no acute bleeding noted   - Ensure adequate nutrition and hydration with altered diet and sub-optimal intake BUN/Cr acceptable   - Continue Eliquis, monitor vitals closley  - Comgmt with IM team   - Patient afebrile; CBC wnl; LFTs wnl; Ammonia wnl  - Continue Abx of UTI   - Calcium elevated > optimal mgmt per endo > adding Sensipar   - Hx of SHAR - continue 2L at night O2 with spot checks - refusing CPAP - obtain noct ox on 2L   - Hold standing long-acting MS Contin 15mg Q12H and transitioned to MS IR 7 5mg Q6H PRN   - Gabapentin 100mg BID weaned down recently            UTI (urinary tract infection)  Assessment & Plan  UA last week with culture growing greater than 100,000 CFU Morganella and Serratia  Started on Ciprofloxacin but transitioned to Cefpodixime per prior providers with plan to continue into 3/10   Dysuria improving     History of stroke  Assessment & Plan  Patient with recent admission in early 2023 for right MCA CVA with petechial hemorrhage and residual left-sided weakness and dysarthria  Patient had mechanical thrombectomy with Dr Zenobia Quan on 2/12    Obstructive sleep apnea  Assessment & Plan  Patient refusing CPAP 2/2 claustrophobia - refuses home or hospital CPAP   2L O2 at night with spot checks   Obtain noct ox on 2L; repeat noct ox prior to d/c for possible home DME     History of "bacteremia  Assessment & Plan  Unclear etiology initially of whether her altered mental status was related to stroke versus polypharmacy versus opioid overdose versus infection versus seizure  1/2 micrococcus with a second set staph coag negative and followed by infectious disease after course of antibiotic treatment  Has been off of antibiotics and repeat blood cultures negative to date, ID felt may be contaminant    Discoloration of skin of foot  Assessment & Plan  N/V intact - no significant discoloration today   Per daughter and patient patient's foot will become blue or red and change colors frequently  No evidence for infectious etiology and had a lower extremity arterial duplex with no evidence of any significant arterial occlusive disease  May be related to the seronegative spondyloarthropathies  Likely the source of of the inaccurate and inconsistent pulse oximetry readings in the hands as well    Hypercalcemia  Assessment & Plan  Nephrology had been following, nonsuppressed PTH with concerns for primary hyperparathyroidism - they defer to endo on mgmt   C spine XR 3/7 - Salt-and-pepper appearance of the skull is partially accentuated by the image processing /sharpening algorithm, though prior head CT suggests this is a true finding  Correlate for clinical/laboratory features of hyperparathyroidism; Salt-and-pepper appearance of the skull accentuated by image processing/sharpening algorithm  However, prior head CT shows a mottled appearance of the skull as well suggesting hyperparathyroidism  No focal lytic or blastic lesions  No acute fractures  Endocrine consult   \"SPEP-no monoclonal bands, UPEP- no monoclonal bands, PTH RP less than 2, 25-hydroxy vitamin D 41 7, 1,25 hydroxy vitamin D 23 7 low, Phos 2 5 low, PTH 65 4   Assessment/Plan:  1  Hypercalcemia: Suspected to be on the basis of primary hyperparathyroidism: Corrected calcium continues to increase slightly    Sensipar 30 mg daily was started " "and is being tolerated  Given history of osteoporosis, if appropriate surgical candidate consider surgery referral as outpatient  Follow-up with endocrinology as outpatient  Monitor corrected calcium level  Adjust dose as needed  Status post a CT of the chest abdomen and pelvis with no overt malignancy  - Patient has allergy to contrast - discussed with endo fellow      HTN (hypertension)  Assessment & Plan  Patient with some orthostasis  IM consulted and with overall management at their discretion during ARC course  Monitor orthostatics; PRN ADDY hose, abdominal binder - ensure adequate hydration   Currently on metoprolol succinate 25 mg twice daily with holding parameters   Plan per cardiology did not add an ARB in the setting of heart failure however as blood pressure allows    Bipolar depression ([facility] [address] )  Assessment & Plan  Currently on Wellbutrin 100 mg twice daily, Lamictal 150 mg nightly, and Cymbalta 60 mg twice daily  Consistent with home regimen  Continue monitoring for stability, mood lability  Consider neuropsychology consultation if patient agreeable    Fall  Assessment & Plan  3/7 early morning - per report \"unwitnessed FALL; Per RN patient set off bed alarm, but has already fallen onto her left side  Confused and wanting to wash her face  Once more awake, mentation improved  \"  - CTH evolving infarct without new bleeding per rads  - L knee mild pain but good ROM without significant edema or instability > improving - XR unremarkable - monitor   - Mild L hip pain but good ROM and can adequately WB > improving - L hip/pelvis XR - mild hip OA without acute findings -  monitor  - Some L shoulder pain - some mildly decreased AROM, +Muñoz > stable -  L shoulder XR - unremarkable - monitor    - Subtle neck pain but full ROM without radicular complaints or focal weakness, sensory changes > improving - C spine XR - no acute findings or change from prior - chronic changes > Chronic hyperkyphosis    " Anterolisthesis at C4-5 is unchanged  No acute subluxation; Salt-and-pepper appearance of the skull accentuated by image processing/sharpening algorithm  Multilevel moderate to severe discogenic and uncovertebral degenerative changes greatest at C5-6 and C6-7  Multilevel mild facet degenerative changes greatest at C4-5  > monitor   - Optimal mgmt with wakefulness/AMS - continue 2L at night O2 with spot checks - refusing CPAP  - Continue 1:1 for safety     Cervical spondylosis  Assessment & Plan  C spine XR 3/7 - Chronic hyperkyphosis  Anterolisthesis at C4-5 is unchanged  No acute subluxation; Salt-and-pepper appearance of the skull accentuated by image processing/sharpening algorithm  However, prior head CT shows a mottled appearance of the skull as well suggesting hyperparathyroidism  No focal lytic or blastic lesions  No acute fractures  Multilevel moderate to severe discogenic and uncovertebral degenerative changes greatest at C5-6 and C6-7    Multilevel mild facet degenerative changes greatest at C4-5   - No signs of myelopathy or radiculopathy - monitor     Osteoporosis  Assessment & Plan  History of an L1 compression fracture status post kyphoplasty  Was previously on Prolia has not been on since 2020  Outpatient referral to endocrine    CHF (congestive heart failure) ([facility] [address] )  Assessment & Plan  Wt Readings from Last 3 Encounters:   03/08/23 59 7 kg (131 lb 9 8 oz)   02/16/23 62 6 kg (138 lb)   02/12/23 67 9 kg (149 lb 11 1 oz)       Cardiomyopathy with reduced ejection fraction as low as 10 to 15% back in 2004 but has improved since and most recently was 25 to 30% on last admission  CXR 3/7 unremarkable; does not appear volume overloaded   Patient was offered LifeVest but declined  On metoprolol succinate 25 mg twice daily, with plan to add an ARB as her blood pressure would allow  Strict I's and O's and daily weights  [facility] Sol [facility]      Seronegative arthropathy of multiple "[facility])  Assessment & Plan  Follows with rheumatology and is on Plaquenil 200 mg Monday through Friday and 400 mg on Saturday Sunday  Continue home regimen and follow-up with rheumatology on discharge    Asthma  Assessment & Plan  3/8 saturating well on RA/2L   3/7 afternoon - patient had questionable desat with therapy but had full improved mentation and was in no resp or other distress at that time  - Comgmt with IM who also evaluated patient at this time    - She historically has had difficulty obtaining O2 from finger tips at times - when pulse ox moved to toe she had good oxygenation on RA-2L  - CXR no acute findings - bibasilar scarring   - Continue use of albuterol inhaler 2 puffs every 6 hours as needed    Chronic pain syndrome  Assessment & Plan  History of chronic pain secondary to compression fractures in her thoracic spine and chronic rib fractures   Patient with over sleepiness at times at sedating meds decreased some recently   · Holding standing long-acting MS Contin 15mg Q12H and transitioned to MS IR 7 5mg Q6H PRN > pain still adequately controlled > monitor closely   · Gabapentin 100mg BID   · Cymbalta 60 milligrams twice daily  · Acetaminophen 650 mg every 6 hours as needed  · [facility] but per family due to concerns for polypharmacy and additional opioids beyond her morphine has not been on an acute care since 2/16  · Lidoderm and Bengay  Aqua K also ordered with instructions not to place over same area  Appreciate IM consultants medical co-management  Labs, medications, and imaging personally reviewed  ROS:  A ten point review of systems was completed on 03/09/23 and pertinent positives are listed in subjective section  All other systems reviewed were negative         OBJECTIVE:   /64 (BP Location: Right arm)   Pulse (!) 109   Temp 97 8 °F (36 6 °C) (Oral)   Resp 18   Ht 5' 3\" (1 6 m)   Wt 59 7 kg (131 lb 9 8 oz)   " SpO2 96%   BMI 23 31 kg/m²     Physical Exam  Vitals and nursing note reviewed  Constitutional:       General: She is not in acute distress  Comments: Hyperarousal however awake and able to answer all questions appropriately   HENT:      Head: Atraumatic  Comments: Left-sided facial droop     Right Ear: External ear normal       Left Ear: External ear normal       Nose: Nose normal  No rhinorrhea  Mouth/Throat:      Mouth: Mucous membranes are moist       Pharynx: Oropharynx is clear  Eyes:      General: No scleral icterus  Cardiovascular:      Rate and Rhythm: Normal rate  Pulses: Normal pulses  Heart sounds: Normal heart sounds  Pulmonary:      Effort: Pulmonary effort is normal  No respiratory distress  Breath sounds: Normal breath sounds  No wheezing  Abdominal:      General: There is no distension  Palpations: Abdomen is soft  Musculoskeletal:      Right lower leg: No edema  Left lower leg: No edema  Skin:     General: Skin is warm and dry  Neurological:      Mental Status: She is oriented to person, place, and time        Comments: Left-sided hemiparesis and dysmetria with mild to moderate dysarthria   Psychiatric:         Mood and Affect: Mood normal          Behavior: Behavior normal           Lab Results   Component Value Date    WBC 9 35 03/07/2023    HGB 14 7 03/07/2023    HCT 46 1 03/07/2023    MCV 98 03/07/2023     03/07/2023     Lab Results   Component Value Date    SODIUM 137 03/07/2023    K 3 9 03/07/2023     03/07/2023    CO2 32 03/07/2023    BUN 18 03/07/2023    CREATININE 1 19 03/07/2023    GLUC 153 (H) 03/07/2023    CALCIUM 10 3 (H) 03/09/2023     Lab Results   Component Value Date    INR 0 95 02/22/2023    INR 0 90 02/12/2023    INR 1 12 04/22/2022    PROTIME 12 8 02/22/2023    PROTIME 12 1 02/12/2023    PROTIME 14 0 04/22/2022           Current Facility-Administered Medications:   •  acetaminophen (TYLENOL) tablet 650 mg, 650 mg, Oral, Q6H PRN, Celestia Fleming, DO, 650 mg at 03/08/23 1327  •  albuterol (PROVENTIL HFA,VENTOLIN HFA) inhaler 2 puff, 2 puff, Inhalation, Q6H PRN, Celestia Fleming, DO, 2 puff at 03/07/23 1358  •  aluminum-magnesium hydroxide-simethicone (MYLANTA) oral suspension 30 mL, 30 mL, Oral, Q4H PRN, Celestia Fleming, DO, 30 mL at 03/08/23 1734  •  apixaban (ELIQUIS) tablet 5 mg, 5 mg, Oral, BID, Celestia Fleming, DO, 5 mg at 03/09/23 1700  •  atorvastatin (LIPITOR) tablet 40 mg, 40 mg, Oral, QPM, Celestia Fleming, DO, 40 mg at 03/09/23 1700  •  bisacodyl (DULCOLAX) rectal suppository 10 mg, 10 mg, Rectal, Once, Vicky Bright MD  •  buPROPion [facility]) tablet 100 mg, 100 mg, Oral, BID, Celestia Fleming, DO, 100 mg at 03/09/23 1701  •  cefpodoxime (VANTIN) tablet 200 mg, 200 mg, Oral, BID With Meals, Celestia Fleming, DO, 200 mg at 03/09/23 1658  •  cinacalcet (SENSIPAR) tablet 30 mg, 30 mg, Oral, Daily With Breakfast, Kolby Cardoso MD, 30 mg at 03/09/23 8190  •  DULoxetine (CYMBALTA) delayed release capsule 60 mg, 60 mg, Oral, BID, Celestia Fleming, DO, 60 mg at 03/09/23 1391  •  gabapentin (NEURONTIN) capsule 100 mg, 100 mg, Oral, BID, Vicky Bright MD, 100 mg at 03/09/23 1700  •  glycerin-hypromellose- (ARTIFICIAL TEARS) ophthalmic solution 1 drop, 1 drop, Both Eyes, Q4H PRN, Celestia Fleming, DO  •  hydroxychloroquine (PLAQUENIL) tablet 200 mg, 200 mg, Oral, Once per day on Mon Tue Wed Thu Fri, Carloz Parnell DO, 200 mg at 03/09/23 9785  •  hydroxychloroquine (PLAQUENIL) tablet 400 mg, 400 mg, Oral, Once per day on Sun Sat, Carloz Parnell DO, 400 mg at 03/05/23 9736  •  lamoTRIgine (LaMICtal) tablet 150 mg, 150 mg, Oral, HS, Douglas Fleming, DO, 150 mg at 03/08/23 2104  •  lidocaine (LIDODERM) 5 % patch 1 patch, 1 patch, Topical, Daily, Rebecca Hood MD, 1 patch at 03/09/23 7690  •  LORazepam (ATIVAN) tablet 0 25 mg, 0 25 mg, Oral, BID PRN, Vicky Bright MD, 0 25 mg at 03/08/23 0938  •  melatonin tablet 3 mg, 3 mg, Oral, HS, LAURA Rutledge, 3 mg at 03/08/23 2104  •  menthol-methyl salicylate (BENGAY) 31-11 % cream, , Apply externally, 4x Daily PRN, Candy Stevens MD, Given at 03/06/23 1227  •  metoprolol succinate (TOPROL-XL) 24 hr tablet 25 mg, 25 mg, Oral, BID, LAURA Tadeo, 25 mg at 03/09/23 1700  •  morphine (MSIR) IR tablet 7 5 mg, 7 5 mg, Oral, Q6H PRN, Juvenal Turner MD  •  ondansetron (ZOFRAN-ODT) dispersible tablet 4 mg, 4 mg, Oral, Q6H PRN, Hezzie Cruz, DO, 4 mg at 03/09/23 1658  •  polyethylene glycol (MIRALAX) packet 17 g, 17 g, Oral, Daily, Hezzie Cruz, DO, 17 g at 03/06/23 4416  •  polyethylene glycol (MIRALAX) packet 17 g, 17 g, Oral, Daily PRN, Hezzie Cruz, DO, 17 g at 03/04/23 1930  •  [START ON 3/10/2023] predniSONE tablet 20 mg, 20 mg, Oral, Daily, LAURA Tadeo  •  senna-docusate sodium (SENOKOT S) 8 6-50 mg per tablet 1 tablet, 1 tablet, Oral, BID, Hezzie Cruz, DO, 1 tablet at 03/09/23 1700  •  sodium chloride (OCEAN) 0 65 % nasal spray 1 spray, 1 spray, Each Nare, Q1H PRN, Quinn Gant MD, 1 spray at 03/08/23 0300    Past Medical History:   Diagnosis Date   • Anxiety    • Asthma    • [facility]    • Chronic narcotic dependence (HCC)    • Chronic pain    • Depression    • Enterovirus heart infection    • SHAR (obstructive sleep apnea)    • Osteoarthritis    • Peripheral neuropathy    • Plantar fasciitis of right foot    • Senile osteoporosis    • Seronegative arthropathy of multiple sites [facility])    • Undifferentiated connective tissue disease ([facility] )        Patient Active Problem List    Diagnosis Date Noted   • Acute CVA (cerebrovascular accident) ([facility] ) 02/23/2023   • AMS (altered mental status) 03/09/2023   • Gout of foot 03/09/2023   • Dysphagia 03/09/2023   • Wound of left ankle 03/09/2023   • UTI (urinary tract infection) 03/06/2023   • Obstructive sleep apnea 02/28/2023   • History of stroke 02/28/2023   • History of bacteremia 02/24/2023   • Hypercalcemia 02/22/2023   • Discoloration of skin of foot 02/22/2023   • Abdominal pain 02/22/2023   • HFrEF (heart failure with reduced ejection fraction) ([place] [address] ) 02/13/2023   • Stroke ([place] [address] ) 02/12/2023   • HTN (hypertension) 02/12/2023   • Cardiomyopathy ([place] [address] ) 02/07/2023   • Vitamin D insufficiency 02/07/2023   • LAILA (acute kidney injury) ([place] [address] ) 04/23/2022   • Polypharmacy 04/23/2022   • 'light-for-dates' infant with signs of fetal malnutrition 04/22/2022   • Bipolar depression ([place] [address] ) 03/04/2022   • Rheumatoid arthritis ([place] [address] ) 03/04/2022   • Closed fracture of multiple ribs of right side 03/02/2022   • Traumatic pneumothorax 03/02/2022   • Closed fracture of transverse process of lumbar vertebra ([place] [address] ) 03/02/2022   • Liver contusion 03/02/2022   • Fall 03/02/2022   • Acute pain due to trauma 03/02/2022   • Right shoulder pain 03/02/2022   • Severe protein-calorie malnutrition ([place] [address] ) 03/02/2022   • Undifferentiated connective tissue disease ([place] [address] ) 12/29/2021   • Primary generalized (osteo)arthritis 12/29/2021   • Peripheral neuropathy 12/29/2021   • Osteoporosis 12/29/2021   • Hx of compression fracture of spine 12/29/2021   • Lumbar spondylosis 12/29/2021   • Cervical spondylosis 12/29/2021   • Anemia 06/26/2021   • Chronic pain syndrome    • Asthma    • Ambulatory dysfunction    • Seronegative arthropathy of multiple sites ([place] [address] )    • Gastroesophageal reflux disease without esophagitis    • Mixed stress and urge urinary incontinence    • CHF (congestive heart failure) ([place] [address] )           Tarah Reid DO  Physical Medicine and [facility] [address]    I have spent a total time of 60 minutes on 03/09/23 in caring for this patient including Risks and benefits of tx options, Instructions for management, Patient and family education, Importance of tx compliance, Risk factor reductions, Impressions, Counseling / Coordination of care, Documenting in the medical record, Reviewing / ordering tests, medicine, procedures   and Communicating with other healthcare professionals   I spent some time with the family including the daughter and brother going over medications as well as discussing the potential options for the etiology of her confusion  She seems to be doing much better today which is related to treatment of her hypercalcemia versus polypharmacy or a combination of both  Treatment plan going forward discussed at length with family who is concerned  All questions were answered    Additional time spent discussing case with speech therapy regarding her video barium swallow study

## 2023-03-09 NOTE — ASSESSMENT & PLAN NOTE
C spine XR 3/7 - Chronic hyperkyphosis  Anterolisthesis at C4-5 is unchanged  No acute subluxation; Salt-and-pepper appearance of the skull accentuated by image processing/sharpening algorithm  However, prior head CT shows a mottled appearance of the skull as well suggesting hyperparathyroidism  No focal lytic or blastic lesions  No acute fractures  Multilevel moderate to severe discogenic and uncovertebral degenerative changes greatest at C5-6 and C6-7    Multilevel mild facet degenerative changes greatest at C4-5   - No signs of myelopathy or radiculopathy - monitor

## 2023-03-09 NOTE — PLAN OF CARE
Problem: PAIN - ADULT  Goal: Verbalizes/displays adequate comfort level or baseline comfort level  Description: Interventions:  - Encourage patient to monitor pain and request assistance  - Assess pain using appropriate pain scale  - Administer analgesics based on type and severity of pain and evaluate response  - Implement non-pharmacological measures as appropriate and evaluate response  - Consider cultural and social influences on pain and pain management  - Notify physician/advanced practitioner if interventions unsuccessful or patient reports new pain  Outcome: Progressing     Problem: INFECTION - ADULT  Goal: Absence or prevention of progression during hospitalization  Description: INTERVENTIONS:  - Assess and monitor for signs and symptoms of infection  - Monitor lab/diagnostic results  - Monitor all insertion sites, i e  indwelling lines, tubes, and drains  - Monitor endotracheal if appropriate and nasal secretions for changes in amount and color  - [place] appropriate cooling/warming therapies per order  - Administer medications as ordered  - Instruct and encourage patient and family to use good hand hygiene technique  - Identify and instruct in appropriate isolation precautions for identified infection/condition  Outcome: Progressing  Goal: Absence of fever/infection during neutropenic period  Description: INTERVENTIONS:  - Monitor WBC    Outcome: Progressing     Problem: SAFETY ADULT  Goal: Patient will remain free of falls  Description: INTERVENTIONS:  - Educate patient/family on patient safety including physical limitations  - Instruct patient to call for assistance with activity   - Consult OT/PT to assist with strengthening/mobility   - Keep Call bell within reach  - Keep bed low and locked with side rails adjusted as appropriate  - Keep care items and personal belongings within reach  - Initiate and maintain comfort rounds  - Make Fall Risk Sign visible to staff  - Offer Toileting every  Hours, in advance of need  - Initiate/Maintain alarm  - Obtain necessary fall risk management equipment:   - Apply yellow socks and bracelet for high fall risk patients  - Consider moving patient to room near nurses station  Outcome: Progressing  Goal: Maintain or return to baseline ADL function  Description: INTERVENTIONS:  -  Assess patient's ability to carry out ADLs; assess patient's baseline for ADL function and identify physical deficits which impact ability to perform ADLs (bathing, care of mouth/teeth, toileting, grooming, dressing, etc )  - Assess/evaluate cause of self-care deficits   - Assess range of motion  - Assess patient's mobility; develop plan if impaired  - Assess patient's need for assistive devices and provide as appropriate  - Encourage maximum independence but intervene and supervise when necessary  - Involve family in performance of ADLs  - Assess for home care needs following discharge   - Consider OT consult to assist with ADL evaluation and planning for discharge  - Provide patient education as appropriate  Outcome: Progressing  Goal: Maintains/Returns to pre admission functional level  Description: INTERVENTIONS:  - Perform BMAT or MOVE assessment daily    - Set and communicate daily mobility goal to care team and patient/family/caregiver  - Collaborate with rehabilitation services on mobility goals if consulted  - Perform Range of Motion  times a day  - Reposition patient every  hours    - Dangle patient  times a day  - Stand patient  times a day  - Ambulate patient  times a day  - Out of bed to chair  times a day   - Out of bed for meals  times a day  - Out of bed for toileting  - Record patient progress and toleration of activity level   Outcome: Progressing     Problem: DISCHARGE PLANNING  Goal: Discharge to home or other facility with appropriate resources  Description: INTERVENTIONS:  - Identify barriers to discharge w/patient and caregiver  - Arrange for needed discharge resources and transportation as appropriate  - Identify discharge learning needs (meds, wound care, etc )  - Arrange for interpretive services to assist at discharge as needed  - Refer to Case Management Department for coordinating discharge planning if the patient needs post-hospital services based on physician/advanced practitioner order or complex needs related to functional status, cognitive ability, or social support system  Outcome: Progressing     Problem: Nutrition/Hydration-ADULT  Goal: Nutrient/Hydration intake appropriate for improving, restoring or maintaining nutritional needs  Description: Monitor and assess patient's nutrition/hydration status for malnutrition  Collaborate with interdisciplinary team and initiate plan and interventions as ordered  Monitor patient's weight and dietary intake as ordered or per policy  Utilize nutrition screening tool and intervene as necessary  Determine patient's food preferences and provide high-protein, high-caloric foods as appropriate       INTERVENTIONS:  - Monitor oral intake, urinary output, labs, and treatment plans  - Assess nutrition and hydration status and recommend course of action  - Evaluate amount of meals eaten  - Assist patient with eating if necessary   - Allow adequate time for meals  - Recommend/ encourage appropriate diets, oral nutritional supplements, and vitamin/mineral supplements  - Order, calculate, and assess calorie counts as needed  - Recommend, monitor, and adjust tube feedings and TPN/PPN based on assessed needs  - Assess need for intravenous fluids  - Provide specific nutrition/hydration education as appropriate  - Include patient/family/caregiver in decisions related to nutrition  Outcome: Progressing     Problem: Prexisting or High Potential for Compromised Skin Integrity  Goal: Skin integrity is maintained or improved  Description: INTERVENTIONS:  - Identify patients at risk for skin breakdown  - Assess and monitor skin integrity  - Assess and monitor nutrition and hydration status  - Monitor labs   - Assess for incontinence   - Turn and reposition patient  - Assist with mobility/ambulation  - Relieve pressure over bony prominences  - Avoid friction and shearing  - Provide appropriate hygiene as needed including keeping skin clean and dry  - Evaluate need for skin moisturizer/barrier cream  - Collaborate with interdisciplinary team   - Patient/family teaching  - Consider wound care consult   Outcome: Progressing     Problem: MOBILITY - ADULT  Goal: Maintain or return to baseline ADL function  Description: INTERVENTIONS:  -  Assess patient's ability to carry out ADLs; assess patient's baseline for ADL function and identify physical deficits which impact ability to perform ADLs (bathing, care of mouth/teeth, toileting, grooming, dressing, etc )  - Assess/evaluate cause of self-care deficits   - Assess range of motion  - Assess patient's mobility; develop plan if impaired  - Assess patient's need for assistive devices and provide as appropriate  - Encourage maximum independence but intervene and supervise when necessary  - Involve family in performance of ADLs  - Assess for home care needs following discharge   - Consider OT consult to assist with ADL evaluation and planning for discharge  - Provide patient education as appropriate  Outcome: Progressing  Goal: Maintains/Returns to pre admission functional level  Description: INTERVENTIONS:  - Perform BMAT or MOVE assessment daily    - Set and communicate daily mobility goal to care team and patient/family/caregiver  - Collaborate with rehabilitation services on mobility goals if consulted  - Perform Range of Motion  times a day  - Reposition patient every  hours    - Dangle patient  times a day  - Stand patient  times a day  - Ambulate patient  times a day  - Out of bed to chair  times a day   - Out of bed for meals  times a day  - Out of bed for toileting  - Record patient progress and toleration of activity level   Outcome: Progressing

## 2023-03-09 NOTE — PROGRESS NOTES
Internal Medicine Progress Note  Patient: Shaniqua Gracia  Age/sex: 75 y o  female  Medical Record #: [id number]      ASSESSMENT/PLAN: (Interval History)  Shaniqua Gracia is seen and examined and management for following issues:    Recent right MCA infarct W/petechial hemorrhage  • S/P thrombectomy by Dr Geovani Steven 2/12  • Continue Eliquis 5 mg twice daily/statin  • Follow-up with neurosurgery/neurology as scheduled     NICM  • EF has been as low as 10 to 15% in the past  • In 2022 was 65%  • Now down to 30% = pt declined LifeVest  • Cardiology felt this was secondary to her recent CVA as well as possible medication noncompliance  • They recommend continuing beta-blocker and adding ARB when BP allows  • Will need outpatient ischemia work-up  • Followed by Dr Codie Espinosa at [facility] however would like to follow with [facility] on dc  • Have not been able to add back ARB yet 2/2 soft BPs and cant go down on Toprol dosing since HRs go into the low 100s at times     Seronegative rheumatoid arthritis  • A/T rheumatology takes Plaquenil 200 mg Monday through Friday 400 mg on Saturday and Sunday  • Continue same  • Follow-up on discharge     Hypercalcemia  · Seen by renal/endocrine on acute side  · Endocrine recommends starting sensipar if corrected calcium >12  · Corrected calcium 12 on 3/6/23 labs  · Endocrine input appreciated  Will need outpt follow-up and potentially a surgical evaluation  · May possibly need imaging with contrast however will need renal input regarding pretreatment for same    Fall  · Overnight 3/6/23 onto left side  · Abrasion on left knee and Xray negative for fracture  · Could not verify whether or not she struck her head  · CT of head stable  · She also c/o L hip pain imaging pending but able to bear weight  · Fall precautions all in place  On continual observation      R great toe gout  · Started prednisone 40mg 3/7 will cont 20mg daily x 3 days  · Uric acid normal  · improved     Chronic pain  • Due to compression fractures of her thoracic spine/chronic rib fractures  • Lethargic 3/7 and MS contin changed to IR  • Pain management per PMR     Bipolar disorder  • Continue current medication regimen  • Follow-up with psychiatry on discharge  • Neuropsych to follow    Anxiety  · PMR adding small dose of xanax bid    Confusion  · On and off  · Remains on 1:1 for safety s/p fall  · Decreased prednisone d/t same     SHAR  • Does not use CPAP at home d/t claustraphobia  • OK use oxygen overnight     Dysuria/Urinary frequency/incontinence  • Urine culture +CRE  • PMR started IV Cipro 400mg every 12 hours for 5 days  • Switched to vantin 200mg bid through 3/9/23  • Symptoms improved        DC planning: 3/22/23       The above assessment and plan was reviewed and updated as determined by my evaluation of the patient on 3/9/2023  Labs:   Results from last 7 days   Lab Units 03/07/23  1437 03/06/23  1224   WBC Thousand/uL 9 35 7 48   HEMOGLOBIN g/dL 14 7 13 1   HEMATOCRIT % 46 1 41 5   PLATELETS Thousands/uL 264 262     Results from last 7 days   Lab Units 03/09/23  0610 03/08/23  0650 03/07/23  1437 03/07/23  1047 03/06/23  1039   SODIUM mmol/L  --   --  137  --  138   POTASSIUM mmol/L  --   --  3 9  --  3 9   CHLORIDE mmol/L  --   --  104  --  108   CO2 mmol/L  --   --  32  --  29   BUN mg/dL  --   --  18  --  15   CREATININE mg/dL  --   --  1 19  --  1 07   CALCIUM mg/dL 10 3* 11 3* 12 0*   < > 11 3*    < > = values in this interval not displayed               Results from last 7 days   Lab Units 03/07/23  1358   POC GLUCOSE mg/dl 127       Review of Scheduled Meds:  Current Facility-Administered Medications   Medication Dose Route Frequency Provider Last Rate   • acetaminophen  650 mg Oral Q6H PRN Peg Likens, DO     • albuterol  2 puff Inhalation Q6H PRN Peg Likens, DO     • aluminum-magnesium hydroxide-simethicone  30 mL Oral Q4H PRN Peg Likens, DO     • apixaban  5 mg Oral BID Peg Likens, DO • atorvastatin  40 mg Oral QPM Patti Gulling, DO     • bisacodyl  10 mg Rectal Once Rishabh Dean MD     • buPROPion  100 mg Oral BID Patti Gubruceing, DO     • cefpodoxime  200 mg Oral BID With Meals Patti Gulling, DO     • cinacalcet  30 mg Oral Daily With Breakfast Anika Castle MD     • DULoxetine  60 mg Oral BID Patti Gulling, DO     • gabapentin  100 mg Oral BID Rishabh Dean MD     • glycerin-hypromellose-  1 drop Both Eyes Q4H PRN Patti Gulling, DO     • hydroxychloroquine  200 mg Oral Once per day on Mon Tue Wed Thu Fri Patti Damianing, DO     • hydroxychloroquine  400 mg Oral Once per day on Sun Sat Patti Damianing, DO     • lamoTRIgine  150 mg Oral HS Patti Damianing, DO     • lidocaine  1 patch Topical Daily Renny Hernandez MD     • LORazepam  0 25 mg Oral BID PRN Rishabh Dean MD     • melatonin  3 mg Oral HS LAURA Bach     • menthol-methyl salicylate   Apply externally 4x Daily PRN Renny Hernandez MD     • metoprolol succinate  25 mg Oral BID LAURA Almanza     • morphine  7 5 mg Oral Q6H PRN Rishabh Dean MD     • ondansetron  4 mg Oral Q6H PRN Patti Gubruceing, DO     • polyethylene glycol  17 g Oral Daily Patti Gubruceing, DO     • polyethylene glycol  17 g Oral Daily PRN Patti Gubruceing, DO     • [START ON 3/10/2023] predniSONE  20 mg Oral Daily LAURA Almanza     • predniSONE  40 mg Oral Daily LAURA Almanza     • senna-docusate sodium  1 tablet Oral BID Patti Gubruceing, DO     • sodium chloride  1 spray Each Nare Q1H PRN Vikas Martin MD         Subjective/ HPI: Patient seen and examined  Patients overnight issues or events were reviewed with nursing or staff during rounds or morning huddle session  New or overnight issues include the following:     Pt seen in her room  She was hypotensive overnight and received a 250mL bolus  Pt currently reports feeling nauseated  She had a VBS today which she states did not go well   She denies any other complaints  ROS:   A 10 point ROS was performed; negative except as noted above  Imaging:     XR hip/pelv 2-3 vws left if performed   Final Result by Castillo Carbajal MD (03/08 1633)      No acute osseous abnormality  Workstation performed: [id number]         XR shoulder 2+ vw left   Final Result by Castillo Carbajal MD (03/08 1635)      No acute osseous abnormality  Workstation performed: [id number]         XR spine cervical 2 or 3 vw injury   Final Result by Castillo Carbajal MD (03/08 1643)      No acute osseous abnormality or significant change from priors  Salt-and-pepper appearance of the skull is partially accentuated by the image processing /sharpening algorithm, though prior head CT suggests this is a true finding  Correlate for clinical/laboratory features of hyperparathyroidism  Workstation performed: [id number]         XR chest portable   Final Result by Nieves Love MD (03/07 1650)      No acute cardiopulmonary disease  Workstation performed: [id number]         CT head wo contrast   Final Result by Tamiko Masters MD (03/07 1358)      Evolving right MCA territory infarcts with associated petechial hemorrhage which is best seen on the recent MRI examination  There is no discrete parenchymal hematoma  Workstation performed: [id number]         XR knee 1 or 2 vw left   Final Result by Nieves Love MD (03/07 9687)      No acute osseous abnormality              Workstation performed: [id number]         FL barium swallow video w speech    (Results Pending)       *Labs /Radiology studies reviewed  *Medications reviewed and reconciled as needed  *Please refer to order section for additional ordered labs studies  *Case discussed with primary attending during morning huddle case rounds    Physical Examination:  Vitals:   Vitals:    03/08/23 1546 03/08/23 1733 03/08/23 2227 03/09/23 0607   BP: 100/57 (!) 84/60 128/65 114/68 BP Location: Left arm   Right arm   Pulse: (!) 115 (!) 115 104 91   Resp: 18  18 18   Temp: 98 3 °F (36 8 °C)  98 °F (36 7 °C) 97 8 °F (36 6 °C)   TempSrc: Oral  Axillary Axillary   SpO2: 96%  95% 96%   Weight:       Height:           GEN: No apparent distress, anxious and frail, tearful  NEURO: Alert and oriented x3; left neglect, cognitive issues as well as recall issues, mild dysarthria; +confusion on and off  HEENT: Pupils are equal and reactive, EOMI, mucous membranes are moist, face asymmetrical  CV: S1 S2 regular, no MRG, no peripheral edema noted  RESP: Lungs are clear bilaterally, no wheezes, rales or rhonchi noted, on room air, respirations easy and non labored  GI: Flat, soft non tender, non distended; +BS x4  : Voiding without difficulty, incontinent at times  MUSC: Moves all extremities; left hemiparesis; right great toe +erythema and painful to touch  SKIN: pink, warm and dry, poor turgor, b/l LE with dressings in place for both prevention and d/t new wound refer to media         The above physical exam was reviewed and updated as determined by my evaluation of the patient on 3/9/2023  Invasive Devices     Peripheral Intravenous Line  Duration           Peripheral IV 03/08/23 Dorsal (posterior); Right Forearm <1 day          Drain  Duration           External Urinary Catheter 8 days                   VTE Pharmacologic Prophylaxis: Eliquis  Code Status: Level 1 - Full Code  Current Length of Stay: 9 day(s)      Total time spent:  30 minutes with more than 50% spent counseling/coordinating care  Counseling includes discussion with patient re: progress  and discussion with patient of his/her current medical state/information  Coordination of patient's care was performed in conjunction with primary service  Time invested included review of patient's labs, vitals, and management of their comorbidities with continued monitoring   In addition, this patient was discussed with medical team including physician and advanced extenders  The care of the patient was extensively discussed and appropriate treatment plan was formulated unique for this patient  Medical decision making for the day was made by supervising physician unless otherwise noted in their attestation statement  ** Please Note:  voice to text software may have been used in the creation of this document   Although proof errors in transcription or interpretation are a potential of such software**

## 2023-03-09 NOTE — PLAN OF CARE
Problem: PAIN - ADULT  Goal: Verbalizes/displays adequate comfort level or baseline comfort level  Description: Interventions:  - Encourage patient to monitor pain and request assistance  - Assess pain using appropriate pain scale  - Administer analgesics based on type and severity of pain and evaluate response  - Implement non-pharmacological measures as appropriate and evaluate response  - Consider cultural and social influences on pain and pain management  - Notify physician/advanced practitioner if interventions unsuccessful or patient reports new pain  Outcome: Progressing     Problem: INFECTION - ADULT  Goal: Absence or prevention of progression during hospitalization  Description: INTERVENTIONS:  - Assess and monitor for signs and symptoms of infection  - Monitor lab/diagnostic results  - Monitor all insertion sites, i e  indwelling lines, tubes, and drains  - Monitor endotracheal if appropriate and nasal secretions for changes in amount and color  - [place] appropriate cooling/warming therapies per order  - Administer medications as ordered  - Instruct and encourage patient and family to use good hand hygiene technique  - Identify and instruct in appropriate isolation precautions for identified infection/condition  Outcome: Progressing  Goal: Absence of fever/infection during neutropenic period  Description: INTERVENTIONS:  - Monitor WBC    Outcome: Progressing     Problem: SAFETY ADULT  Goal: Patient will remain free of falls  Description: INTERVENTIONS:  - Educate patient/family on patient safety including physical limitations  - Instruct patient to call for assistance with activity   - Consult OT/PT to assist with strengthening/mobility   - Keep Call bell within reach  - Keep bed low and locked with side rails adjusted as appropriate  - Keep care items and personal belongings within reach  - Initiate and maintain comfort rounds  - Make Fall Risk Sign visible to staff  - Offer Toileting every 2 Hours, in advance of need  - Initiate/Maintain bed  chair alarm  - Obtain necessary fall risk management equipment: nonskid socks  - Apply yellow socks and bracelet for high fall risk patients  - Consider moving patient to room near nurses station  Outcome: Progressing  Goal: Maintain or return to baseline ADL function  Description: INTERVENTIONS:  -  Assess patient's ability to carry out ADLs; assess patient's baseline for ADL function and identify physical deficits which impact ability to perform ADLs (bathing, care of mouth/teeth, toileting, grooming, dressing, etc )  - Assess/evaluate cause of self-care deficits   - Assess range of motion  - Assess patient's mobility; develop plan if impaired  - Assess patient's need for assistive devices and provide as appropriate  - Encourage maximum independence but intervene and supervise when necessary  - Involve family in performance of ADLs  - Assess for home care needs following discharge   - Consider OT consult to assist with ADL evaluation and planning for discharge  - Provide patient education as appropriate  Outcome: Progressing  Goal: Maintains/Returns to pre admission functional level  Description: INTERVENTIONS:  - Perform BMAT or MOVE assessment daily    - Set and communicate daily mobility goal to care team and patient/family/caregiver  - Collaborate with rehabilitation services on mobility goals if consulted  - Perform Range of Motion 3 times a day  - Reposition patient every 2 hours    - Dangle patient 3 times a day  - Stand patient 3 times a day  - Ambulate patient 3 times a day  - Out of bed to chair 3 times a day   - Out of bed for meals 3 times a day  - Out of bed for toileting  - Record patient progress and toleration of activity level   Outcome: Progressing     Problem: DISCHARGE PLANNING  Goal: Discharge to home or other facility with appropriate resources  Description: INTERVENTIONS:  - Identify barriers to discharge w/patient and caregiver  - Arrange for needed discharge resources and transportation as appropriate  - Identify discharge learning needs (meds, wound care, etc )  - Arrange for interpretive services to assist at discharge as needed  - Refer to Case Management Department for coordinating discharge planning if the patient needs post-hospital services based on physician/advanced practitioner order or complex needs related to functional status, cognitive ability, or social support system  Outcome: Progressing     Problem: Nutrition/Hydration-ADULT  Goal: Nutrient/Hydration intake appropriate for improving, restoring or maintaining nutritional needs  Description: Monitor and assess patient's nutrition/hydration status for malnutrition  Collaborate with interdisciplinary team and initiate plan and interventions as ordered  Monitor patient's weight and dietary intake as ordered or per policy  Utilize nutrition screening tool and intervene as necessary  Determine patient's food preferences and provide high-protein, high-caloric foods as appropriate       INTERVENTIONS:  - Monitor oral intake, urinary output, labs, and treatment plans  - Assess nutrition and hydration status and recommend course of action  - Evaluate amount of meals eaten  - Assist patient with eating if necessary   - Allow adequate time for meals  - Recommend/ encourage appropriate diets, oral nutritional supplements, and vitamin/mineral supplements  - Order, calculate, and assess calorie counts as needed  - Recommend, monitor, and adjust tube feedings and TPN/PPN based on assessed needs  - Assess need for intravenous fluids  - Provide specific nutrition/hydration education as appropriate  - Include patient/family/caregiver in decisions related to nutrition  Outcome: Progressing     Problem: Prexisting or High Potential for Compromised Skin Integrity  Goal: Skin integrity is maintained or improved  Description: INTERVENTIONS:  - Identify patients at risk for skin breakdown  - Assess and monitor skin integrity  - Assess and monitor nutrition and hydration status  - Monitor labs   - Assess for incontinence   - Turn and reposition patient  - Assist with mobility/ambulation  - Relieve pressure over bony prominences  - Avoid friction and shearing  - Provide appropriate hygiene as needed including keeping skin clean and dry  - Evaluate need for skin moisturizer/barrier cream  - Collaborate with interdisciplinary team   - Patient/family teaching  - Consider wound care consult   Outcome: Progressing     Problem: MOBILITY - ADULT  Goal: Maintain or return to baseline ADL function  Description: INTERVENTIONS:  -  Assess patient's ability to carry out ADLs; assess patient's baseline for ADL function and identify physical deficits which impact ability to perform ADLs (bathing, care of mouth/teeth, toileting, grooming, dressing, etc )  - Assess/evaluate cause of self-care deficits   - Assess range of motion  - Assess patient's mobility; develop plan if impaired  - Assess patient's need for assistive devices and provide as appropriate  - Encourage maximum independence but intervene and supervise when necessary  - Involve family in performance of ADLs  - Assess for home care needs following discharge   - Consider OT consult to assist with ADL evaluation and planning for discharge  - Provide patient education as appropriate  Outcome: Progressing  Goal: Maintains/Returns to pre admission functional level  Description: INTERVENTIONS:  - Perform BMAT or MOVE assessment daily    - Set and communicate daily mobility goal to care team and patient/family/caregiver  - Collaborate with rehabilitation services on mobility goals if consulted  - Perform Range of Motion 3 times a day  - Reposition patient every 2 hours    - Dangle patient 3 times a day  - Stand patient 3 times a day  - Ambulate patient 3 times a day  - Out of bed to chair 3 times a day   - Out of bed for meals 3 times a day  - Out of bed for toileting  - Record patient progress and toleration of activity level   Outcome: Progressing

## 2023-03-09 NOTE — PROGRESS NOTES
03/09/23 1000   Pain Assessment   Pain Assessment Tool 0-10   Pain Score No Pain   Restrictions/Precautions   Precautions 1:1;Aspiration;Bed/chair alarms;Cognitive; Fall Risk;Impulsive;Contact/isolation;O2;Supervision on toilet/commode;Visual deficit   Eating   Comment Pt completed VFSS study  Refer to Procedure note for full details  Swallow Assessment   Swallow Treatment Assessment Assessment Summary:    Pt presents with moderate-severe oropharyngeal dysphagia where pt demonstrated weakened lip seal noted to be mildly decreased around tsp, leading to inconsistent trace anterior loss on initial tsp presentations but likely due to increased xerostomia which was observed  Bolus control of thin and NT liquids by tsp, cup and straw was observed to be decreased, leading to premature spill to pharynx prior to swallow  Bolus manipulation is delayed given puree noting spill to pharynx prior to swallow  Mastication was PROLONGED with soft solids (both banana and peaches) administered today  Bolus formation given soft solids was observed to be decreased to where despite prolonged mastication given these textures overall effectiveness in breakdown/formulation was decreased noting that pt did swallow banana >peaches more so whole  Pt did have an episode of brief nasal regurgitation x1 w/ NT by tsp  Transition between with oral and pharyngeal stages was moderately delayed w/ puree, NT and thin textures administered  Hyolaryngeal excursion was decreased given all consistencies  It was observed that BOT retractions is weaker which it was observed that pt did have fairly consistent retention given puree and soft textures  Also observed during assessment was weakened/decreased pharyngeal constriction across textures which lead to moderate amount of pharyngeal retention (more vallecular vs pyriform sinus retention)  Pt did also present w/ trace to minimal PPW w/ all consistencies observed   While pt's epiglottis was inverting during swallow, overall timing given airway protection during swallows more w/ NT and thins was decreased  Pt was observed w/ sluggish movement/return of epiglottis intermittently during assessment  It was observed that pt did have a cricopharyngeal prominence around to C5-C6 level which did NOT impinge passage of boluses  Unfortunately, pt did have increased laryngeal penetration which ranged from high level but then deep to the level of the vocal cords as well as then mostly silent aspiration events w/ thin liquids but also NT by straw  Refer to below for observations for full report for specific event w/ penetration and aspiration  Note: Images are available for review in PACS as desired  Recommendations:   Recommended Diet:  puree/level 1 diet and nectar thick liquids by CUP ONLY-NO STRAWS  Recommended Form of Medications: whole with puree   Aspiration precautions and compensatory swallowing strategies: upright posture, only feed when fully alert, slow rate of feeding, small bites/sips, no straws, effortful swallow, cough every 4-5 bites/sips, quiet environment (tv off, limit talking, door closed, etc ), alternating bites and sips, OOB for ALL meals, MUST be AWAKE/ALERT for meals  Oral care with suction w/ after ALL meals  Once oral care was completed, pt is allowed ICE CHIPS between meals  However, pt MUST be FULLY upright when consuming ice chips  Consider referral to: GI, dietician     SLP Dysphagia therapy recommended: yes    Results Reviewed with: patient, RN, MD- Dr Gavin Glover, family (dtr, [place] and brother, [place]), PT, OT and pt's 1:1 sitter      Pt/Family Education: initiated  Pt and caregivers would benefit from continued education  Swallow Assessment Prognosis   Prognosis Fair   Prognosis Considerations Age; Co-morbidities; Medical diagnosis; Medical prognosis; Severity of impairments;New learning ability;Ability to carry over   SLP Therapy Minutes   SLP Time In 1000   SLP Time Out 1100 SLP Total Time (minutes) 60   SLP Mode of treatment - Individual (minutes) 60   SLP Mode of treatment - Concurrent (minutes) 0   SLP Mode of treatment - Group (minutes) 0   SLP Mode of treatment - Co-treat (minutes) 0   SLP Mode of Treatment - Total time(minutes) 60 minutes   SLP Cumulative Minutes 435   Therapy Time missed   Time missed?  No

## 2023-03-09 NOTE — PROGRESS NOTES
03/09/23 1300   Pain Assessment   Pain Assessment Tool 0-10   Restrictions/Precautions   Precautions 1:1;Aphasia;Bed/chair alarms;Cognitive; Fall Risk;Supervision on toilet/commode;Contact/isolation;Visual deficit   Cognition   Overall Cognitive Status Impaired   Arousal/Participation Alert; Cooperative   Attention Attends with cues to redirect   Orientation Level Oriented to person;Oriented to place;Oriented to situation   Memory Decreased short term memory;Decreased recall of recent events;Decreased recall of precautions   Following Commands Follows one step commands with increased time or repetition   Subjective   Subjective pt in the recliner awake and ready for PT  Roll Left and Right   Type of Assistance Needed Supervision;Verbal cues   Comment + bedrail - family to purchase   Roll Left and Right CARE Score 4   Sit to Lying   Type of Assistance Needed Supervision;Verbal cues   Comment HOB slightly elevated no rail   Sit to Lying CARE Score 4   Lying to Sitting on Side of Bed   Type of Assistance Needed Verbal cues; Supervision   Comment HOB slightly elevated no rail   Lying to Sitting on Side of Bed CARE Score 4   Sit to Stand   Type of Assistance Needed Physical assistance;Verbal cues   Physical Assistance Level 26%-50%   Comment guarding on the left side to assist L hand on arm rest   Sit to Stand CARE Score 3   Bed-Chair Transfer   Type of Assistance Needed Physical assistance;Verbal cues   Physical Assistance Level 51%-75%   Comment SPT with R HHA vs sit /stand pivot without AD which was better this session - assist mostly provided to ensure pt is centered in front of the chair prior to sitting   SpO2 92%, IA 87 at rest to SpO2 97%, IA 89 after task   Chair/Bed-to-Chair Transfer CARE Score 2   Walk 10 Feet   Type of Assistance Needed Physical assistance;Verbal cues   Physical Assistance Level 26%-50%   Comment hands on hips guarding x 10'   Walk 10 Feet CARE Score 3   Walk 50 Feet with Two Turns   Type of Assistance Needed Physical assistance;Verbal cues   Physical Assistance Level 26%-50%   Comment hands on hips guarding x 50 x 3   Walk 50 Feet with Two Turns CARE Score 3   Wheel 50 Feet with Two Turns   Type of Assistance Needed Supervision;Verbal cues   Wheel 50 Feet with Two Turns CARE Score 4   Wheel 150 Feet   Type of Assistance Needed Supervision;Verbal cues   Comment x 150' less VC for obstacle negotiation   Wheel 150 Feet CARE Score 4   Curb or Single Stair   Style negotiated Curb   Type of Assistance Needed Physical assistance;Verbal cues   Physical Assistance Level Total assistance   Comment mod of 2 HHA   1 Step (Curb) CARE Score 1   Therapeutic Interventions   Neuromuscular Re-Education repeated seated balloon tapping and ball kicking with L UE/LE only   Assessment   Treatment Assessment at this time family is requesting subacute placement after OT family training  skilled PT focused on demonstrating current functional status of the pt which she was able to actively engaged with  although continues to demo impaired safety yvette during SPT requiring the most assist vs walking on level surface  again only tolerating household distance mobilities at this time due to SOB and fatigue but overall pt physically looked better today compared to previous days with less confusion as well  noted slight improvement with voluntary L hand use during sit to stand transfers even without verbal cues but due to weakness,impaired prioception, inattention still requires assist to keep in place  PT will cont to work on strengthening, NMR/NPP and functional mobility training including w/c level mobilities  Problem List Decreased strength;Decreased range of motion;Decreased endurance; Impaired balance;Decreased mobility; Decreased coordination;Decreased cognition; Impaired judgement;Decreased safety awareness; Impaired vision; Impaired sensation   Barriers to Discharge Inaccessible home environment;Decreased caregiver support   PT Barriers   Functional Limitation Ramp negotiation;Stair negotiation;Car transfers;Standing;Transfers; Walking   Plan   Treatment/Interventions Functional transfer training; Therapeutic exercise; Endurance training;Bed mobility;Gait training;Patient/family training;Equipment eval/education;Spoke to MD;Spoke to nursing;OT;Spoke to advanced practitioner;Spoke to case management; Compensatory technique education;Cognitive reorientation   Progress Progressing toward goals   PT Therapy Minutes   PT Time In 1300   PT Time Out 1400   PT Total Time (minutes) 60   PT Mode of treatment - Individual (minutes) 60   PT Mode of treatment - Concurrent (minutes) 0   PT Mode of treatment - Group (minutes) 0   PT Mode of treatment - Co-treat (minutes) 0   PT Mode of Treatment - Total time(minutes) 60 minutes   PT Cumulative Minutes 625

## 2023-03-09 NOTE — ASSESSMENT & PLAN NOTE
Complete.   Mentation improving as of 3/13 and more consistently after reduction in opioids as well as addition of Sensipar decreasing her overall calcium levels- Continues to be stable as of 3/17  3/7 Patient with some sleepiness at times apparently last several days with poor activity tolerance and now s/p fall - appears back to recent baseline on follow-up eval   CT head 3/7 - Evolving right MCA territory infarcts with associated petechial hemorrhage which is best seen on the recent MRI examination  There is no discrete parenchymal hematoma   - discussed above with radiologist Della - no acute bleeding noted   - Ensure adequate nutrition and hydration with altered diet and sub-optimal intake BUN/Cr acceptable   - Continue Eliquis, monitor vitals closley  - Comgmt with IM team   - Patient afebrile; CBC wnl; LFTs wnl; Ammonia wnl  - Continue Abx of UTI   - Calcium elevated > optimal mgmt per endo > adding Sensipar   - Hx of SHAR - continue 2L at night O2 with spot checks - refusing CPAP - obtain noct ox on 2L   - Hold standing long-acting MS Contin 15mg Q12H and transitioned to MS IR 7 5mg Q6H PRN   - Gabapentin 100mg BID weaned down recently

## 2023-03-09 NOTE — PROGRESS NOTES
"Occupational Therapy Treatment Note       03/09/23 5520   Pain Assessment   Pain Assessment Tool 0-10   Pain Score No Pain   Restrictions/Precautions   Precautions 1:1;Aphasia; Aspiration;Bed/chair alarms;Cognitive; Fall Risk;Impulsive;Contact/isolation;O2;Supervision on toilet/commode;Visual deficit   Lifestyle   Autonomy \"I wasn't paying attention to you\" - after OT turned off TV 2* pt being distracted   Upper Body Dressing   Type of Assistance Needed Physical assistance   Physical Assistance Level 51%-75%   Comment pt verbally states \"I need to put my affected side in the shirt first\" but she is unable to attend to L side/physically perform this without assist  she also requires assist to thread over head, she is then able to thread R UE into shirt   Upper Body Dressing CARE Score 2   Sit to Stand   Type of Assistance Needed Physical assistance   Physical Assistance Level 26%-50%   Comment assist x1 infront   Sit to Stand CARE Score 3   Bed-Chair Transfer   Type of Assistance Needed Physical assistance   Physical Assistance Level 51%-75%   Comment stand pivot transfer EOB to recliner chair assist x1 in front, no device   Chair/Bed-to-Chair Transfer CARE Score 2   Cognition   Overall Cognitive Status Impaired   Attention Attends with cues to redirect   Orientation Level Oriented to person;Oriented to place;Oriented to situation   Memory Decreased short term memory;Decreased recall of recent events;Decreased recall of precautions   Following Commands Follows one step commands with increased time or repetition   Activity Tolerance   Medical Staff Made Aware pt's dtr asking to speak with team regarding home versus current meds - dr Donal Myrick present to discuss with her  Assessment   Treatment Assessment Pt participated in skilled OT tx session  See above for further details on functional performance  Session focused on family training with pt's dtr Lauren Vela; son Maryanne Frankel, and brother Mekhi Fuelling   OT educated family on the following: " currently pt requires mod/max assist for ADL routine  Based on pt's current functioning recommendation would be for wheelchair level at home  [facility] 3 weeks at [facility], which would place d/c at 3/22, about 2 more weeks from now  Even in that time as pt has physical improvements, OT is recommending 24/7 assist/supervision 2* pt's cognitive deficits, as well as physical deficits, which increase pt's risk for falls  Anticipate pt will still require assist with transfers and ADL routine, as well as assist for all IADLS  Pt's family would like to pursue subacute rehab, as they would like pt to experience as much inpatient rehab as possible to maximize recovery post stroke  Family is familiar with [facility]  OT did educate that subacute rehab centers don't provide/accept pt's while on 1:1 and that entire team and family can continue to work towards strategies to increase pt's cognition and decrease impulsivity with goal for weaning 1:1  Family verbalizes understanding of this  Also educated family that subacute may only have pt for a couple more weeks (not months) and that even at that time pt could still require assist with all transfers/ADLs at home  Pt's family is supportive and they have already arranged a 1st floor setup and are planning on how to assist pt upon d/c in future  Pt's dtr will plan to assist pt 3x/week with showering  OT educated on eventual home therapy after subacute rehab and then progressing to outpatient therapy at neuro rehab center ([address] their closest location) and anticipate that pt will be in some form of therapy (but not all inpatient) for 4-6 months post stroke  Pt will continue to benefit from skilled OT intervention to address functional cognitive retraining, L inattention,L UE neuromuscular re-education in order to maximize functional independence in ADLS, functional mobility/transfers, while decreasing burden of care  Pt present with PT for continued family training at end of session  Pt's bethany Martin email address: [email or web address] - she would like list of subacute rehabs from [facility]  Prognosis Fair   Problem List Decreased strength;Decreased range of motion;Decreased endurance; Impaired balance;Decreased mobility; Decreased coordination;Decreased cognition; Impaired judgement;Decreased safety awareness; Impaired vision; Impaired sensation   Barriers to Discharge Inaccessible home environment;Decreased caregiver support   Plan   Treatment/Interventions ADL retraining;Functional transfer training; Therapeutic exercise; Endurance training;Cognitive reorientation;Patient/family training;Bed mobility; Equipment eval/education; Compensatory technique education   Progress Progressing toward goals   Recommendation   OT Discharge Recommendation Post acute rehabilitation services   OT Therapy Minutes   OT Time In 1230   OT Time Out 1330   OT Total Time (minutes) 60   OT Mode of treatment - Individual (minutes) 60   OT Mode of treatment - Concurrent (minutes) 0   OT Mode of treatment - Group (minutes) 0   OT Mode of treatment - Co-treat (minutes) 0   OT Mode of Treatment - Total time(minutes) 60 minutes   OT Cumulative Minutes 750   Therapy Time missed   Time missed?  No

## 2023-03-10 PROBLEM — E87.6 HYPOKALEMIA: Status: ACTIVE | Noted: 2023-03-10

## 2023-03-10 LAB
ALBUMIN SERPL BCP-MCNC: 2.8 G/DL (ref 3.5–5)
ANION GAP SERPL CALCULATED.3IONS-SCNC: 7 MMOL/L (ref 4–13)
BUN SERPL-MCNC: 12 MG/DL (ref 5–25)
CALCIUM SERPL-MCNC: 10.2 MG/DL (ref 8.3–10.1)
CHLORIDE SERPL-SCNC: 113 MMOL/L (ref 96–108)
CO2 SERPL-SCNC: 26 MMOL/L (ref 21–32)
CREAT SERPL-MCNC: 1 MG/DL (ref 0.6–1.3)
GFR SERPL CREATININE-BSD FRML MDRD: 58 ML/MIN/1.73SQ M
GLUCOSE P FAST SERPL-MCNC: 86 MG/DL (ref 65–99)
GLUCOSE SERPL-MCNC: 86 MG/DL (ref 65–140)
POTASSIUM SERPL-SCNC: 3.4 MMOL/L (ref 3.5–5.3)
SODIUM SERPL-SCNC: 146 MMOL/L (ref 135–147)

## 2023-03-10 RX ORDER — POTASSIUM CHLORIDE 20 MEQ/1
40 TABLET, EXTENDED RELEASE ORAL ONCE
Status: COMPLETED | OUTPATIENT
Start: 2023-03-10 | End: 2023-03-10

## 2023-03-10 RX ORDER — PANTOPRAZOLE SODIUM 40 MG/1
40 TABLET, DELAYED RELEASE ORAL
Status: DISCONTINUED | OUTPATIENT
Start: 2023-03-11 | End: 2023-03-25 | Stop reason: HOSPADM

## 2023-03-10 RX ORDER — PRAZOSIN HYDROCHLORIDE 1 MG/1
1 CAPSULE ORAL
Status: DISCONTINUED | OUTPATIENT
Start: 2023-03-10 | End: 2023-03-25 | Stop reason: HOSPADM

## 2023-03-10 RX ORDER — POTASSIUM CHLORIDE 14.9 MG/ML
20 INJECTION INTRAVENOUS ONCE
Status: COMPLETED | OUTPATIENT
Start: 2023-03-10 | End: 2023-03-10

## 2023-03-10 RX ADMIN — POTASSIUM CHLORIDE 20 MEQ: 14.9 INJECTION, SOLUTION INTRAVENOUS at 14:53

## 2023-03-10 RX ADMIN — CEFPODOXIME PROXETIL 200 MG: 200 TABLET, FILM COATED ORAL at 06:31

## 2023-03-10 RX ADMIN — BUPROPION HYDROCHLORIDE TABLETS 100 MG: 100 TABLET, FILM COATED ORAL at 10:00

## 2023-03-10 RX ADMIN — GABAPENTIN 100 MG: 100 CAPSULE ORAL at 09:50

## 2023-03-10 RX ADMIN — ONDANSETRON 4 MG: 4 TABLET, ORALLY DISINTEGRATING ORAL at 08:36

## 2023-03-10 RX ADMIN — SENNOSIDES AND DOCUSATE SODIUM 1 TABLET: 8.6; 5 TABLET ORAL at 09:50

## 2023-03-10 RX ADMIN — MORPHINE SULFATE 7.5 MG: 15 TABLET ORAL at 06:30

## 2023-03-10 RX ADMIN — LORAZEPAM 0.25 MG: 0.5 TABLET ORAL at 15:47

## 2023-03-10 RX ADMIN — APIXABAN 5 MG: 5 TABLET, FILM COATED ORAL at 17:50

## 2023-03-10 RX ADMIN — GABAPENTIN 100 MG: 100 CAPSULE ORAL at 17:50

## 2023-03-10 RX ADMIN — CINACALCET 30 MG: 30 TABLET, FILM COATED ORAL at 06:31

## 2023-03-10 RX ADMIN — APIXABAN 5 MG: 5 TABLET, FILM COATED ORAL at 09:50

## 2023-03-10 RX ADMIN — LIDOCAINE 5% 1 PATCH: 700 PATCH TOPICAL at 09:57

## 2023-03-10 RX ADMIN — PRAZOSIN HYDROCHLORIDE 1 MG: 1 CAPSULE ORAL at 21:05

## 2023-03-10 RX ADMIN — METOPROLOL SUCCINATE 25 MG: 25 TABLET, EXTENDED RELEASE ORAL at 17:50

## 2023-03-10 RX ADMIN — ATORVASTATIN CALCIUM 40 MG: 40 TABLET, FILM COATED ORAL at 17:50

## 2023-03-10 RX ADMIN — PREDNISONE 20 MG: 20 TABLET ORAL at 09:50

## 2023-03-10 RX ADMIN — LAMOTRIGINE 150 MG: 100 TABLET ORAL at 21:05

## 2023-03-10 RX ADMIN — MELATONIN 3 MG: at 21:05

## 2023-03-10 RX ADMIN — METOPROLOL SUCCINATE 25 MG: 25 TABLET, EXTENDED RELEASE ORAL at 09:50

## 2023-03-10 RX ADMIN — MORPHINE SULFATE 7.5 MG: 15 TABLET ORAL at 13:28

## 2023-03-10 RX ADMIN — POTASSIUM CHLORIDE 40 MEQ: 1500 TABLET, EXTENDED RELEASE ORAL at 09:50

## 2023-03-10 RX ADMIN — DULOXETINE HYDROCHLORIDE 60 MG: 60 CAPSULE, DELAYED RELEASE ORAL at 09:50

## 2023-03-10 RX ADMIN — BUPROPION HYDROCHLORIDE TABLETS 100 MG: 100 TABLET, FILM COATED ORAL at 17:51

## 2023-03-10 RX ADMIN — HYDROXYCHLOROQUINE SULFATE 200 MG: 200 TABLET ORAL at 06:31

## 2023-03-10 RX ADMIN — DULOXETINE HYDROCHLORIDE 60 MG: 60 CAPSULE, DELAYED RELEASE ORAL at 21:05

## 2023-03-10 NOTE — PROGRESS NOTES
"   03/10/23 1100   Pain Assessment   Pain Assessment Tool 0-10   Pain Score No Pain   Restrictions/Precautions   Precautions 1:1;Aspiration;Bed/chair alarms;Cognitive; Fall Risk;Impulsive;Contact/isolation;Supervision on toilet/commode;Visual deficit   Comprehension   Comprehension (FIM) 4 - Understands basic info/conversation 75-90% of time   Expression   Expression (FIM) 4 - Expresses basic info/needs 75-90% of time   Social Interaction   Social Interaction (FIM) 5 - Interacts appropriately with others 90% of time   Problem Solving   Problem solving (FIM) 3 - Solves basic problmes 50-74% of time   Memory   Memory (FIM) 3 - Recognizes, recalls/performs 50-74%   Speech/Language/Cognition Assessmetn   Treatment Assessment Prior to dysphagia tx session, SLP engaged pt in cognitive tx session, which MD, Dr Miguel Katz was in room, d/w pt about current medical status  SLP providing MD update in recommendations at this time for diet as well as recommendations for GI consult for further guidance in likely esophageal deficits  Pt was in bed upon entering room, to which SLP encouraged pt to get OOB into recliner for meal, which pt was able to follow basic commands to sit EOB prior to transferring  Once in recliner, SLP showed pt a \"cheat sheet\" which provides pt w/ a re-orientation to current place, date, reason for hospitalization, in attempts to re-orient when pt is feeling disoriented  Pt was highly receptive to this and thankful for use given this strategy  Otherwise, SLP engaged pt in verbal problem solving task which a situation was provided and then 4 possible solutions  Pt was to determine the BEST solution to the situation  Pt was 10/13 accurate in ability to independently ID the BEST solutions, noting difficulty given the last 3 items, suspecting some level of fatigue  However, when provided repetition of information, pt was able to choose the correct solutions to those remaining items   Additionally, SLP engaged pt in " a recall/category exclusion task by providing pt w/ 4 words and pt was to determine the word which did not belong  Ability for pt to determine the target word w/o repetition was 5/10 accurate, needing repetition for the word lists given all remaining items, which pt was then 10/10 accurate  However, pt was 9/10 accurate in verbalizing how the remaining words were similar  At this time, pt will continue to benefit form skilled SLP services at this time to maximize overall functional speech and cognitive linguistic skills in attempts to decrease caregiver burden over time     Eating   Type of Assistance Needed Set-up / clean-up;Supervision;Verbal cues   Physical Assistance Level No physical assistance   Eating CARE Score 4   Swallow Assessment   Swallow Treatment Assessment   Daily Dysphagia Tx Note     Patient Name: Alexia Cabral    VYFLS'I Date: 3/10/2023      Current Risks for Dysphagia & Aspiration: Weak voicing; weak cough; dysarthria; general debilitation; new neuro event; brain injury; cognitive deficit; hx neurologic dx; positioning issues     Current Symptoms/Concerns: Cough; throat clear; with liquids; during meals; with pills; difficulty chewing; decreased oral intake; hx of dysphagia/aspiration     Current diet: puree/level 1 diet and thin liquids      Premorbid diet::upon d/c from prior admission, pt appeared to be on dysphagia level 2/thin liquids upon prior admission for stroke initially; unknown prior to admission, pt rx being on softer foods as well as thickened liquids in the past       Positioning: upright in recliner       Items administered:Consistencies Administered: nectar thick and puree  Materials administered included: pureed carrots, mashed potatoes, pudding and NT by cup    Total amount of meal consumed:   1 tsp of pureed beef- pt gagged and spit out  10% of pureed carrots  25% of mashed potatoes  25% of pudding  180cc of NTL by cup       Oral stage:moderate  Lip closure: decreased to L "side given tsp as meal progressed  Anterior spillage: given puree/pudding near end of meal to L side of mouth  Manipulation: moderate delay and prolonged manipulation was observed given puree/pudding items  Bolus formation: decreased  Bolus control: likely decreased w/ NT  Transfer: increased effort was observed w/ puree/pudding today  Oral residue: no overt residual observed  Pocketing: none         Pharyngeal stage:moderate-severe  Swallow promptness: moderate-severely delayed w/ puree; min-mod delay w/ NT  Hyolaryngeal elevation: continues to be decreased; pharyngeal constriction was noted to still be decreased/weakened   Wet voice: minimal/inconsistent throughout meal  Throat clear: spontaneous x1 after NT; SLP did require to prompt pt to provide throat clears throughout meal  Cough: none  Secondary swallows: given puree/pudding texture; intermittently w/ NT  Audible swallows: increased w/ NT       Esophageal stage:SLP did hear \"gurgling\" at times after swallows throughout meals, however no overt c/o food sticking nor overt bottom up reflux        Summary:     Pt presenting with moderate oral and moderate-severe pharyngeal dysphagia today  Symptoms or concerns included decreased bolus propulsion, delayed oral intiation, suspected decreased control of NT by cup when taking larger sips , piecemeal deglutition and anterior loss/spillage of puree/pudding to L side of mouth suspected pharyngeal swallow delay, suspected decreased hyolaryngeal elevation upon palpation, suspected pharyngeal residue, multiple swallows, effortful swallow, audible swallows and spontaneous throat clear x1 elicited given meal  SLP providing verbal cues to cough/throat clear throughout meal       Pt was setup w/ tray today, where pt was able to feed self, BUT did require increased education throughout meal for swallow strategies   Additionally, SLP did have to provide fairly consistent verbal cues given meal to slow pacing, encourage effortful " swallows/double swallows, alternating solids/liquids, small bites/sips and prompting throat clear/cough throughout meal  It was noted that on pt's initial trial of pureed beef, manipulation and transit was moderate-severely prolonged as well as noting increased effort in attempts to initiate swallow, but pt eliciting gag response, to where pt spitting out item  Due to this, focus was towards pureed carrots, mashed potatoes ad pudding for remainder of meal      While lip seal around cup was functional today, as meal progressed, lip seal was decreased to L side to where pt demonstrating increased L anterior loss given puree/pudding textures  Pt does have decreased sensation given this anterior loss requiring verbal cues from SLP to clear face  Overall bolus manipulation given puree was moderately effortful to where it was observed that pt's transfer also exhibited increased effort to elicit swallow  While bolus transfer given NT was somewhat prompter, it was likely that decreased bolus control occurred  Swallow initiation was delayed w/ both consistencies observed, eliciting decreased hyolaryngeal excursion and know decreased pharyngeal constriction which was evident by pt's consistent double swallows w/ puree  Highly audible swallows observed w/ NTL today  Pt w/ spontaneous throat clear x1 after NTL, but minimally did present w/ wet voicing throughout meal, which prompted SLP to provide pt w/ verbal cues to produce strong throat clear/coughs, which are fairly effective in clearing any possible penetration/aspiration          Recommendations:  Recommended Diet:  puree/level 1 diet and nectar thick liquids by CUP ONLY-NO STRAWS  Recommended Form of Medications: whole with puree   Aspiration precautions and compensatory swallowing strategies: upright posture, only feed when fully alert, slow rate of feeding, small bites/sips, no straws, effortful swallow, cough every 4-5 bites/sips, quiet environment (tv off, limit talking, door closed, etc ), alternating bites and sips, OOB for ALL meals, MUST be AWAKE/ALERT for meals  Oral care with suction w/ after ALL meals  Once oral care was completed, pt is allowed ICE CHIPS between meals  However, pt MUST be FULLY upright when consuming ice chips  FULL supervision w/ meals  Results reviewed with:  patient, RN and pt's 1:1 sitter   Aspiration precautions posted  Reflux precautions-->Remain upright at least 30 min post meals    F/u ST tx: Pt will continue to benefit from ongoing skilled dysphagia tx sessions to establish safest least restrictive diet w/o increased oropharyngeal or aspiration sxs as well as monitor ability to carryover swallow strategies independently  Plan: Focus on use of swallow strategies for ALL meals as pt required mod-max verbal cues given meal today            Monitor pt's current tolerance for diet             Initiate swallow exercises as able            Thin liquid trials under SLP supervision only   Swallow Assessment Prognosis   Prognosis Fair   Prognosis Considerations Age; Co-morbidities; Medical diagnosis; Medical prognosis; Severity of impairments;New learning ability;Ability to carry over   SLP Therapy Minutes   SLP Time In 1100   SLP Time Out 1200   SLP Total Time (minutes) 60   SLP Mode of treatment - Individual (minutes) 60   SLP Mode of treatment - Concurrent (minutes) 0   SLP Mode of treatment - Group (minutes) 0   SLP Mode of treatment - Co-treat (minutes) 0   SLP Mode of Treatment - Total time(minutes) 60 minutes   SLP Cumulative Minutes 570   Therapy Time missed   Time missed?  No

## 2023-03-10 NOTE — PLAN OF CARE
Problem: PAIN - ADULT  Goal: Verbalizes/displays adequate comfort level or baseline comfort level  Description: Interventions:  - Encourage patient to monitor pain and request assistance  - Assess pain using appropriate pain scale  - Administer analgesics based on type and severity of pain and evaluate response  - Implement non-pharmacological measures as appropriate and evaluate response  - Consider cultural and social influences on pain and pain management  - Notify physician/advanced practitioner if interventions unsuccessful or patient reports new pain  Outcome: Progressing     Problem: INFECTION - ADULT  Goal: Absence or prevention of progression during hospitalization  Description: INTERVENTIONS:  - Assess and monitor for signs and symptoms of infection  - Monitor lab/diagnostic results  - Monitor all insertion sites, i e  indwelling lines, tubes, and drains  - Monitor endotracheal if appropriate and nasal secretions for changes in amount and color  - [place] appropriate cooling/warming therapies per order  - Administer medications as ordered  - Instruct and encourage patient and family to use good hand hygiene technique  - Identify and instruct in appropriate isolation precautions for identified infection/condition  Outcome: Progressing  Goal: Absence of fever/infection during neutropenic period  Description: INTERVENTIONS:  - Monitor WBC    Outcome: Progressing     Problem: SAFETY ADULT  Goal: Patient will remain free of falls  Description: INTERVENTIONS:  - Educate patient/family on patient safety including physical limitations  - Instruct patient to call for assistance with activity   - Consult OT/PT to assist with strengthening/mobility   - Keep Call bell within reach  - Keep bed low and locked with side rails adjusted as appropriate  - Keep care items and personal belongings within reach  - Initiate and maintain comfort rounds  - Make Fall Risk Sign visible to staff  - Offer Toileting every 2-4 Hours, in advance of need  - Initiate/Maintain bed/chair alarm  - Obtain necessary fall risk management equipment: nonskid footwear  - Apply yellow socks and bracelet for high fall risk patients  - Consider moving patient to room near nurses station  Outcome: Progressing  Goal: Maintain or return to baseline ADL function  Description: INTERVENTIONS:  -  Assess patient's ability to carry out ADLs; assess patient's baseline for ADL function and identify physical deficits which impact ability to perform ADLs (bathing, care of mouth/teeth, toileting, grooming, dressing, etc )  - Assess/evaluate cause of self-care deficits   - Assess range of motion  - Assess patient's mobility; develop plan if impaired  - Assess patient's need for assistive devices and provide as appropriate  - Encourage maximum independence but intervene and supervise when necessary  - Involve family in performance of ADLs  - Assess for home care needs following discharge   - Consider OT consult to assist with ADL evaluation and planning for discharge  - Provide patient education as appropriate  Outcome: Progressing  Goal: Maintains/Returns to pre admission functional level  Description: INTERVENTIONS:  - Perform BMAT or MOVE assessment daily    - Set and communicate daily mobility goal to care team and patient/family/caregiver  - Collaborate with rehabilitation services on mobility goals if consulted  - Perform Range of Motion 3 times a day  - Reposition patient every 2 hours    - Dangle patient 3 times a day  - Stand patient 3 times a day  - Ambulate patient 3 times a day  - Out of bed to chair 3 times a day   - Out of bed for meals 3 times a day  - Out of bed for toileting  - Record patient progress and toleration of activity level   Outcome: Progressing     Problem: DISCHARGE PLANNING  Goal: Discharge to home or other facility with appropriate resources  Description: INTERVENTIONS:  - Identify barriers to discharge w/patient and caregiver  - Arrange for needed discharge resources and transportation as appropriate  - Identify discharge learning needs (meds, wound care, etc )  - Arrange for interpretive services to assist at discharge as needed  - Refer to Case Management Department for coordinating discharge planning if the patient needs post-hospital services based on physician/advanced practitioner order or complex needs related to functional status, cognitive ability, or social support system  Outcome: Progressing

## 2023-03-10 NOTE — CONSULTS
[facility] - Inpatient Consultation  Shaniqua Gracia 75 y o  female MRN: [id number]  Unit/Bed#: [facility] 453-01 Encounter: [id number]    Reason for Consult / Principal Problem:     Esophageal Dysmotility    ASSESSMENT & PLAN:      Esophageal Dysmotility  -We were consulted for esophageal dysmotility as noted on barium swallow  Patient has had chronic issues with dysphagia  -Known history of GERD   -3/9 Barium Swallow: Esophageal screening was completed  Pt was observed to have slower motility given puree, soft solids noted in upper esophagus  However, at end of assessment, esophageal scan was completed to where it was observed that pt exhibited retropulsion of food stuff at the mid-esophagus level and minimal clearance over time of this food which remained in mid to lower esophagus  SLP had pt use NT wash which did push held up food in mid-lower esophagus through LES  Esophagus does appear to be tortuous as well which can impact clearance of food/liquid   -2/22 CT CAP: 1  Mild questionable colonic wall thickening, which may be related to underdistention versus mild colitis in the appropriate clinical setting    2   Small postprocedural hematoma measuring up to 2 cm adjacent to the right femoral vessels    3   No acute abnormality in the chest   -Currently anticoagulated on Eliquis   -K 3 4   -Dysphagia 1 Diet- Puree, Nectar Thick Liquid  -PRN Mylanta for indigestion, heartburn  Plan:  -Esophagram ordered   -Will add Protonix 40 mg oral daily  -F/U with GI outpatient   -As patient is currently anticoagulated and symptoms appear chronic, EGD unlikely this admission    -Replete K as needed  -Dietary recommendations as per speech and nutrition   -Continue to monitor  GERD  -PRN Mylanta for indigestion, heartburn    -Will add Protonix 40 mg oral daily    -See A&P for esophageal dysmotility  Dysphagia  -See A&P for esophageal dysmotility       Acute CVA  -Per primary "team     Hypertension  -Per primary team      CHF  -Per primary team   ______________________________________________________________________    HISTORY OF PRESENT ILLNESS:  Patient is a 76 y/o female with PMH including asthma, anxiety, bipolar depression, chronic narcotic use, SHAR, undifferentiated connective tissue disorder, enteroviral infection of the heart who initially presented with AMS- now undergoing rehab with [facility] S/P acute CVA  We were consulted for esophageal dysmotility  Per patient, she has had difficulties with swallowing for \"months and months\" prior to admission  Difficulties swallowing solids as well as liquids, currently on liquid diet  At home she ate mostly liquids with some solid food  These swallowing difficulties have remained relatively constant  Occasionally vomits food back up but this has not happened in more than 3 weeks  Patient reports abdominal pain, constant that has been present for \"weeks  \" No distension  No constipation or diarrhea recently  No blood in urine or stool  She denies substance use  REVIEW OF SYSTEMS:    CONSTITUTIONAL: Denies any fever, chills, rigors  HEENT: No earache or tinnitus, denies hearing loss or visual disturbances  CARDIOVASCULAR: No chest pain or palpitations   RESPIRATORY: Denies any cough, hemoptysis, shortness of breath or dyspnea on exertion  GASTROINTESTINAL: As noted in the History of Present Illness   GENITOURINARY: No problems with urination, denies any hematuria or dysuria  NEUROLOGIC: No dizziness or vertigo, denies headaches   MUSCULOSKELETAL: Denies any muscle or joint pain   SKIN: Denies skin rashes or itching   ENDOCRINE: Denies excessive thirst, denies intolerance to heat or cold  PSYCHOSOCIAL: Denies depression or anxiety      Historical Information   Past Medical History:   Diagnosis Date   • Anxiety    • Asthma    • Bipolar depression ([facility] [address] )    • [facility]    • Chronic pain    • Depression    • Enterovirus heart " infection    • SHAR (obstructive sleep apnea)    • Osteoarthritis    • Peripheral neuropathy    • Plantar fasciitis of right foot    • Senile osteoporosis    • Seronegative arthropathy of multiple sites ([facility] [address] )    • Undifferent[facility] disease [facility]      Past Surgical History:   Procedure Laterality Date   • BREAST IMPLANT     • FIXATION KYPHOPLASTY LUMBAR SPINE     • HYSTERECTOMY     • INCONTINENCE SURGERY N/A    • IR STROKE ALERT  2/12/2023   • NASAL SEPTOPLASTY W/ TURBINOPLASTY N/A    • RECTAL PROLAPSE REPAIR N/A    • REPAIR RECTOCELE     • RHINOPLASTY N/A      Social History   Social History     Substance and Sexual Activity   Alcohol Use Not Currently    Comment: quit     Social History     Substance and Sexual Activity   Drug Use Yes   • Types: Marijuana    Comment: has medical card     Social History     Tobacco Use   Smoking Status Former   Smokeless Tobacco Never     Family History   Problem Relation Age of Onset   • No Known Problems Mother    • No Known Problems Father    • Arthritis Family    • Diabetes Family    • Stroke Family        Meds/Allergies   Medications Prior to Admission   Medication   • acetaminophen (TYLENOL) 325 mg tablet   • albuterol (PROVENTIL HFA,VENTOLIN HFA) 90 mcg/act inhaler   • apixaban (ELIQUIS) 5 mg   • Artificial Saliva (SALIVA [facility] MT)   • atorvastatin (LIPITOR) 40 mg tablet   • buPROPion (WELLBUTRIN) 100 mg tablet   • DULOXETINE HCL PO   • gabapentin (NEURONTIN) 300 mg capsule   • hydroxychloroquine (PLAQUENIL) 200 mg tablet   • lamoTRIgine (LaMICtal) 150 MG tablet   • Loratadine 10 MG CAPS   • melatonin 3 mg   • metoprolol succinate (TOPROL-XL) 25 mg 24 hr tablet   • morphine (MS CONTIN) 15 mg 12 hr tablet   • polyethylene glycol (MIRALAX) 17 g packet   • senna-docusate sodium (SENOKOT S) 8 6-50 mg per tablet     Current Facility-Administered Medications   Medication Dose Route Frequency   • acetaminophen (TYLENOL) tablet 650 mg  650 mg Oral Q6H PRN   • albuterol (PROVENTIL HFA,VENTOLIN HFA) inhaler 2 puff  2 puff Inhalation Q6H PRN   • aluminum-magnesium hydroxide-simethicone (MYLANTA) oral suspension 30 mL  30 mL Oral Q4H PRN   • apixaban (ELIQUIS) tablet 5 mg  5 mg Oral BID   • atorvastatin (LIPITOR) tablet 40 mg  40 mg Oral QPM   • bisacodyl (DULCOLAX) rectal suppository 10 mg  10 mg Rectal Once   • buPROPion (WELLBUTRIN) tablet 100 mg  100 mg Oral BID   • cinacalcet (SENSIPAR) tablet 30 mg  30 mg Oral Daily With Breakfast   • DULoxetine (CYMBALTA) delayed release capsule 60 mg  60 mg Oral BID   • gabapentin (NEURONTIN) capsule 100 mg  100 mg Oral BID   • glycerin-hypromellose- (ARTIFICIAL TEARS) ophthalmic solution 1 drop  1 drop Both Eyes Q4H PRN   • hydroxychloroquine (PLAQUENIL) tablet 200 mg  200 mg Oral Once per day on Mon Tue Wed Thu Fri   • hydroxychloroquine (PLAQUENIL) tablet 400 mg  400 mg Oral Once per day on Sun Sat   • lamoTRIgine (LaMICtal) tablet 150 mg  150 mg Oral HS   • lidocaine (LIDODERM) 5 % patch 1 patch  1 patch Topical Daily   • LORazepam (ATIVAN) tablet 0 25 mg  0 25 mg Oral BID PRN   • melatonin tablet 3 mg  3 mg Oral HS   • menthol-methyl salicylate (BENGAY) 61-12 % cream   Apply externally 4x Daily PRN   • metoprolol succinate (TOPROL-XL) 24 hr tablet 25 mg  25 mg Oral BID   • morphine (MSIR) IR tablet 7 5 mg  7 5 mg Oral Q6H PRN   • ondansetron (ZOFRAN-ODT) dispersible tablet 4 mg  4 mg Oral Q6H PRN   • [START ON 3/11/2023] pantoprazole (PROTONIX) EC tablet 40 mg  40 mg Oral Early Morning   • polyethylene glycol (MIRALAX) packet 17 g  17 g Oral Daily   • polyethylene glycol (MIRALAX) packet 17 g  17 g Oral Daily PRN   • potassium chloride 20 mEq IVPB (premix)  20 mEq Intravenous Once   • prazosin (MINIPRESS) capsule 1 mg  1 mg Oral HS   • predniSONE tablet 20 mg  20 mg Oral Daily   • senna-docusate sodium (SENOKOT S) 8 6-50 mg per tablet 1 tablet  1 tablet Oral BID   • sodium chloride (OCEAN) 0 65 % nasal spray 1 spray  1 "spray Each Nare Q1H PRN     Allergies   Allergen Reactions   • Dye [Iodinated Contrast Media] Anaphylaxis     IVP dye   • Bactrim [Sulfamethoxazole-Trimethoprim] Hives   • Erythromycin Hives   • Penicillins        PHYSICAL EXAM:      Objective   Blood pressure 123/66, pulse (!) 108, temperature 98 3 °F (36 8 °C), temperature source Oral, resp  rate 20, height 5' 3\" (1 6 m), weight 59 7 kg (131 lb 9 8 oz), SpO2 97 %  Body mass index is 23 31 kg/m²  Intake/Output Summary (Last 24 hours) at 3/10/2023 1457  Last data filed at 3/9/2023 1800  Gross per 24 hour   Intake 270 ml   Output --   Net 270 ml       General Appearance:   Alert, cooperative, no distress  Frail appearing woman  HEENT:   Normocephalic, atraumatic, anicteric     Neck:   Supple, symmetrical, trachea midline   Lungs:   Equal chest rise, respirations unlabored    Heart:   Regular rate and rhythm   Abdomen:   Soft, non-tender, non-distended; normal bowel sounds; no masses, no organomegaly    Rectal:   Deferred    Extremities:   No cyanosis, clubbing or edema    Neuro:   Left-sided weakness, facial droop       Skin:   No jaundice, rashes, or lesions      LAB RESULTS:     Admission on 02/28/2023   Component Date Value   • WBC 03/02/2023 9 83    • RBC 03/02/2023 4 17    • Hemoglobin 03/02/2023 13 5    • Hematocrit 03/02/2023 41 3    • MCV 03/02/2023 99 (H)    • MCH 03/02/2023 32 4    • MCHC 03/02/2023 32 7    • RDW 03/02/2023 14 3    • MPV 03/02/2023 10 4    • Platelets 57/85/4994 245    • nRBC 03/02/2023 0    • Neutrophils Relative 03/02/2023 82 (H)    • Immat GRANS % 03/02/2023 0    • Lymphocytes Relative 03/02/2023 13 (L)    • Monocytes Relative 03/02/2023 4    • Eosinophils Relative 03/02/2023 1    • Basophils Relative 03/02/2023 0    • Neutrophils Absolute 03/02/2023 8 02 (H)    • Immature Grans Absolute 03/02/2023 0 02    • Lymphocytes Absolute 03/02/2023 1 23    • Monocytes Absolute 03/02/2023 0 41    • Eosinophils Absolute 03/02/2023 0 12    • " Basophils Absolute 03/02/2023 0 03    • Sodium 03/02/2023 138    • Potassium 03/02/2023 4 2    • Chloride 03/02/2023 108    • CO2 03/02/2023 31    • ANION GAP 03/02/2023 -1 (L)    • BUN 03/02/2023 14    • Creatinine 03/02/2023 0 91    • Glucose 03/02/2023 107    • Glucose, Fasting 03/02/2023 107 (H)    • Calcium 03/02/2023 10 6 (H)    • eGFR 03/02/2023 65    • Color, UA 03/03/2023 Yellow    • Clarity, UA 03/03/2023 Slightly Cloudy    • Specific Gravity, UA 03/03/2023 1 020    • pH, UA 03/03/2023 7 0    • Leukocytes, UA 03/03/2023 Large (A)    • Nitrite, UA 03/03/2023 Negative    • Protein, UA 03/03/2023 Trace (A)    • Glucose, UA 03/03/2023 Negative    • Ketones, UA 03/03/2023 Negative    • Urobilinogen, UA 03/03/2023 <2 0    • Bilirubin, UA 03/03/2023 Negative    • Occult Blood, UA 03/03/2023 Negative    • RBC, UA 03/03/2023 None Seen    • WBC, UA 03/03/2023 20-30 (A)    • Epithelial Cells 03/03/2023 Occasional    • Bacteria, UA 03/03/2023 Occasional    • Urine Culture 03/03/2023 >100,000 cfu/ml Serratia marcescens (A)    • Urine Culture 03/03/2023 >100,000 cfu/ml Morganella morganii (A)    • Ventricular Rate 03/03/2023 99    • Atrial Rate 03/03/2023 99    • NC Interval 03/03/2023 194    • QRSD Interval 03/03/2023 98    • QT Interval 03/03/2023 348    • QTC Interval 03/03/2023 446    • P Axis 03/03/2023 34    • QRS Axis 03/03/2023 5    • T Wave [place] 03/03/2023 118    • WBC 03/06/2023 7 48    • RBC 03/06/2023 4 20    • Hemoglobin 03/06/2023 13 1    • Hematocrit 03/06/2023 41 5    • MCV 03/06/2023 99 (H)    • MCH 03/06/2023 31 2    • MCHC 03/06/2023 31 6    • RDW 03/06/2023 14 5    • MPV 03/06/2023 10 2    • Platelets 83/92/3488 262    • nRBC 03/06/2023 0    • Neutrophils Relative 03/06/2023 75    • Immat GRANS % 03/06/2023 0    • Lymphocytes Relative 03/06/2023 13 (L)    • Monocytes Relative 03/06/2023 9    • Eosinophils Relative 03/06/2023 2    • Basophils Relative 03/06/2023 1    • Neutrophils Absolute 03/06/2023 5 63    • Immature Grans Absolute 03/06/2023 0 03    • Lymphocytes Absolute 03/06/2023 0 99    • Monocytes Absolute 03/06/2023 0 68    • Eosinophils Absolute 03/06/2023 0 11    • Basophils Absolute 03/06/2023 0 04    • Sodium 03/06/2023 138    • Potassium 03/06/2023 3 9    • Chloride 03/06/2023 108    • CO2 03/06/2023 29    • ANION GAP 03/06/2023 1 (L)    • BUN 03/06/2023 15    • Creatinine 03/06/2023 1 07    • Glucose 03/06/2023 80    • Calcium 03/06/2023 11 3 (H)    • Corrected Calcium 03/06/2023 12 0 (H)    • AST 03/06/2023 25    • ALT 03/06/2023 18    • Alkaline Phosphatase 03/06/2023 55    • Total Protein 03/06/2023 6 6    • Albumin 03/06/2023 3 1 (L)    • Total Bilirubin 03/06/2023 0 64    • eGFR 03/06/2023 53    • Calcium, Ionized 03/06/2023 1 34 (H)    • Calcium 03/07/2023 10 7 (H)    • Albumin 03/07/2023 2 8 (L)    • Phosphorus 03/07/2023 2 5    • Uric Acid 03/07/2023 5 3    • WBC 03/07/2023 9 35    • RBC 03/07/2023 4 69    • Hemoglobin 03/07/2023 14 7    • Hematocrit 03/07/2023 46 1    • MCV 03/07/2023 98    • MCH 03/07/2023 31 3    • MCHC 03/07/2023 31 9    • RDW 03/07/2023 14 7    • MPV 03/07/2023 10 3    • Platelets 20/90/3933 264    • nRBC 03/07/2023 0    • Neutrophils Relative 03/07/2023 89 (H)    • Immat GRANS % 03/07/2023 0    • Lymphocytes Relative 03/07/2023 6 (L)    • Monocytes Relative 03/07/2023 5    • Eosinophils Relative 03/07/2023 0    • Basophils Relative 03/07/2023 0    • Neutrophils Absolute 03/07/2023 8 26 (H)    • Immature Grans Absolute 03/07/2023 0 03    • Lymphocytes Absolute 03/07/2023 0 57 (L)    • Monocytes Absolute 03/07/2023 0 43    • Eosinophils Absolute 03/07/2023 0 03    • Basophils Absolute 03/07/2023 0 03    • Sodium 03/07/2023 137    • Potassium 03/07/2023 3 9    • Chloride 03/07/2023 104    • CO2 03/07/2023 32    • ANION GAP 03/07/2023 1 (L)    • BUN 03/07/2023 18    • Creatinine 03/07/2023 1 19    • Glucose 03/07/2023 153 (H)    • Calcium 03/07/2023 12 0 (H)    • AST 03/07/2023 21    • ALT 03/07/2023 18    • Alkaline Phosphatase 03/07/2023 69    • Total Protein 03/07/2023 7 3    • Albumin 03/07/2023 3 6    • Total Bilirubin 03/07/2023 0 76    • eGFR 03/07/2023 47    • Ammonia 03/07/2023 14    • POC Glucose 03/07/2023 127    • Albumin 03/08/2023 2 4 (L)    • Phosphorus 03/08/2023 1 7 (L)    • Magnesium 03/08/2023 2 0    • PTH 03/08/2023 76 2    • Calcium 03/08/2023 11 3 (H)    • Albumin 03/09/2023 2 8 (L)    • Calcium 03/09/2023 10 3 (H)    • Sodium 03/10/2023 146    • Potassium 03/10/2023 3 4 (L)    • Chloride 03/10/2023 113 (H)    • CO2 03/10/2023 26    • ANION GAP 03/10/2023 7    • BUN 03/10/2023 12    • Creatinine 03/10/2023 1 00    • Glucose 03/10/2023 86    • Glucose, Fasting 03/10/2023 86    • Calcium 03/10/2023 10 2 (H)    • eGFR 03/10/2023 58    • Albumin 03/10/2023 2 8 (L)        RADIOLOGY RESULTS: I have personally reviewed pertinent imaging studies      Doreen Luong MD  [facility]  Internal Medicine Residency PGY-1

## 2023-03-10 NOTE — ASSESSMENT & PLAN NOTE
Hypokalemia with potassium down to 3 4 given 40 mill equivalents today on 3/10 but IV infiltrated  Able to get PO dose over weekend   Potasium now WNL  Recheck BMP as of 3/20 up to 3 7 and stable

## 2023-03-10 NOTE — PROGRESS NOTES
03/10/23 1330   Pain Assessment   Pain Assessment Tool 0-10   Pain Score No Pain   Restrictions/Precautions   Precautions 1:1;Aphasia; Aspiration;Bed/chair alarms;Cognitive; Fall Risk;Impulsive;Contact/isolation;O2;Supervision on toilet/commode;Visual deficit   Weight Bearing Restrictions No   ROM Restrictions No   Cognition   Overall Cognitive Status Impaired   Arousal/Participation Alert; Cooperative   Attention Attends with cues to redirect   Orientation Level Oriented to person;Oriented to place; Disoriented to time;Disoriented to situation   Memory Decreased short term memory;Decreased recall of recent events;Decreased recall of precautions   Following Commands Follows one step commands with increased time or repetition   Sit to Lying   Type of Assistance Needed Supervision; Adaptive equipment   Comment HOB slightly elevated   Sit to Lying CARE Score 4   Lying to Sitting on Side of Bed   Type of Assistance Needed Verbal cues; Supervision   Comment HOB slightly elevated   Lying to Sitting on Side of Bed CARE Score 4   Sit to Stand   Type of Assistance Needed Physical assistance;Verbal cues   Physical Assistance Level 26%-50%   Comment B hands on gait belt   Sit to Stand CARE Score 3   Bed-Chair Transfer   Type of Assistance Needed Physical assistance;Verbal cues   Physical Assistance Level 26%-50%   Comment MOD VC's to turn completly when sitting in chair  Chair/Bed-to-Chair Transfer CARE Score 3   Walk 10 Feet   Type of Assistance Needed Physical assistance;Verbal cues; Adaptive equipment   Physical Assistance Level 26%-50%   Comment B hands on gait belt   Walk 10 Feet CARE Score 3   Walk 50 Feet with Two Turns   Type of Assistance Needed Physical assistance;Verbal cues; Adaptive equipment   Physical Assistance Level 26%-50%   Comment B hands on gait belt   Walk 50 Feet with Two Turns CARE Score 3   Ambulation   Primary Mode of Locomotion Prior to Admission Walk   Distance Walked (feet) 50 ft  (x3)   Assist Device (B hands on gait belt)   Gait Pattern Ataxic; Slow Amy;Decreased foot clearance; Forward Flexion;Narrow AYLA;Shuffle;Improper weight shift; Lateral deviation   Limitations Noted In Balance; Coordination; Endurance; Heel Strike;Posture; Safety;Speed;Strength;Swing   Provided Assistance with: Balance;Direction   Walk Assist Level Minimum Assist;Moderate Assist   Does the patient walk? 2  Yes   Wheel 50 Feet with Two Turns   Type of Assistance Needed Supervision   Comment CS, VC's for L side attention   Wheel 50 Feet with Two Turns CARE Score 4   Wheelchair mobility   Does the patient use a wheelchair? 1  Yes   Type of Wheelchair Used 1  Manual   Method Right upper extremity;Right lower extremity; Left lower extremity   Assistance Provided For Locking Brakes;Obstacles;Remove Leg Rest;Replace Leg Rest;Remove armrests;Replace armrests   Distance Level Surface (feet) 50 ft  (x2)   Curb or Single Stair   Comment pt resting    Reason if not Attempted Medical concerns   1 Step (Curb) CARE Score 88   4 Steps   Comment pt resting    Reason if not Attempted Medical concerns   4 Steps CARE Score 88   12 Steps   Reason if not Attempted Medical concerns   12 Steps CARE Score 88   Toilet Transfer   Type of Assistance Needed Physical assistance;Verbal cues   Physical Assistance Level 26%-50%   Comment SPT with B hands on gait belt, A ginger pants   Toilet Transfer CARE Score 3   Therapeutic Interventions   Neuromuscular Re-Education seated LUE balloon taps, seated LLE kick ball as pt's resting HR increased to the 130's   Other pt's o2 saturation remained above 90% with act  Assessment   Treatment Assessment Pt participated in skilled PT session with increased focus on gait, functional transfers, WC mobility and L attention  Pt resting HR started to increase to 130's during session limiting gait and standing act  Pt was scheduled for 90 min but nursing needed to perform ultrasound on pt for IV line to be inserted   Pt did compete 60 min session  Cont POC as tolerated with cont focus on gait, increased balance, coordination, increased safety awareness and carryover of new learning  Problem List Decreased strength;Decreased range of motion;Decreased endurance; Impaired balance;Decreased mobility; Decreased cognition;Decreased coordination; Impaired judgement;Decreased safety awareness; Impaired vision   Barriers to Discharge Inaccessible home environment;Decreased caregiver support   PT Barriers   Functional Limitation Ramp negotiation;Stair negotiation;Car transfers;Standing;Transfers; Walking   Plan   Treatment/Interventions LE strengthening/ROM; Functional transfer training; Therapeutic exercise; Endurance training;Cognitive reorientation;Patient/family training;Gait training;Bed mobility   Progress Progressing toward goals   Recommendation   PT Discharge Recommendation   (TBD)   PT Therapy Minutes   PT Time In 1330   PT Time Out 1430   PT Total Time (minutes) 60   PT Mode of treatment - Individual (minutes) 60   PT Mode of treatment - Concurrent (minutes) 0   PT Mode of treatment - Group (minutes) 0   PT Mode of treatment - Co-treat (minutes) 0   PT Mode of Treatment - Total time(minutes) 60 minutes   PT Cumulative Minutes 685   Therapy Time missed   Time missed?  Yes   Amount of time missed 30   Reason for time missed Medical procedure

## 2023-03-10 NOTE — PROGRESS NOTES
PM&R PROGRESS NOTE:  Jonah Franks 75 y o  female MRN: [id number]  Unit/Bed#: -01 Encounter: [id number]      Rehabilitation Diagnosis: Impairment of mobility, safety, Activities of Daily Living (ADLs), and cognitive/communication skills due to Stroke:  01 1  Left Body Involvement (Right Brain)    HPI: Jonah Franks is a 75 y o  female with history of asthma, anxiety, bipolar depression, chronic narcotic use, previous enteroviral infection of the heart, seronegative arthropathy of multiple joints, undifferentiated connective tissue disorder, obstructive sleep apnea on home CPAP who presented to the [facility] on 2/22 for altered mental status  According to the patient's daughter on chart review, the patient's mental status had been declining for the prior 48 hours to admission  She did receive Narcan which improved her mental status slightly  Of note she had a recent stroke with revascularization on 2/12/2023 with Dr Reynoso Medicine  She was seen by neurology with overall good strength except mildly weak in the lower extremities with brisk reflexes on the left and a positive Haroon's  At this time is felt to be potentially related to polypharmacy in the setting of chronic pain meds versus seizure in the setting of a recent stroke  She was initiated on 81 mg of aspirin daily with a plan of potentially transitioning to Eliquis, plan for EEG  CT of the head was negative  MRI was completed showing a new small acute/recent interval lacunar infarction in the right cerebral pedun there is jacinta  She was started on Eliquis  Nephrology was consulted as well for hypercalcemia  The patient was evaluated by the Rehabilitation team and deemed an appropriate candidate for comprehensive inpatient rehabilitation and admitted to the [facility] on 2/28/2023  3:41 PM patient was seen in room with brother at bedside and all questions regarding the acute inpatient rehabilitation program were answered  We also discussed at length course in the hospital and her last hospitalization to verify events and clarify any imaging or laboratory findings  Patient is eager to start her therapy program       SUBJECTIVE: Patient seen and evaluated at bedside  Still on one-to-one continuous observation but plans to continue encouragement of use of call bell and wean off potentially to just overnight  She had a very rather lucid day yesterday and participating well in therapy during a family training event  Today she seems also fairly good which may be secondary to decrease in her medications versus starting of the Sensipar  She does states she is very restless at night and would like to restart some of her home medications which I will consider after review with the internal medicine team and further chart review  She was taking Minipress in the past   Discussed the results with the patient and speech therapy present regarding the video barium swallow including the esophageal dysmotility  During discussion planned for involvement of gastroenterology for recommendations whether that be physical intervention versus medication management versus outpatient follow-up  Currently on modified diet not happy about her nectar thick liquids but necessary as she is a very high risk for aspiration  Patient denies fever, chills, nausea, emesis, cough, shortness of breath, diarrhea, or constipation  Sleep was poor, mood stable  Pain is controlled  ASSESSMENT: Stable, progressing      PLAN:    Rehabilitation  • Functional deficits:  Self care and mobility  • Continue current rehabilitation plan of care to maximize function  • Functional update:   Physical Therapy Occupational Therapy Speech Therapy   Weight Bearing Status: Full Weight Bearing  Transfers:  Moderate Assistance  Bed Mobility: Moderate Assistance  Amulation Distance (ft): 70 feet  Ambulation: Moderate Assistance, Minimal Assistance  Assistive Device for Ambulation: Hand Hold Assistance  Wheelchair Mobility: Moderate Assistance  Assistive Device for Stairs: Bilateral Hand Rails  Stair Assistance: Moderate Assistance  Discharge Recommendations:  (pending progress)   Eating: Minimal Assistance  Grooming: Minimal Assistance  Bathing: Maximum Assistance  Bathing: Maximum Assistance  Upper Body Dressing: Maximum Assistance  Lower Body Dressing: Maximum Assistance  Toileting: Moderate Assistance  Toilet Transfer: Moderate Assistance  Cognition: Exceptions to WNL  Cognition: Decreased Memory, Decreased Executive Functions, Impulsive, Decreased Attention, Decreased Safety, Decreased Comprehension  Orientation: Person, Time, Situation   Mode of Communication: Verbal  Speech/Language: Dysarthia  Cognition: Exceptions to WNL  Cognition: Decreased Memory, Decreased Executive Functions, Decreased Attention, Decreased Comprehension, Decreased Safety  Orientation: Person, Place, Time, Situation  Swallowing: Exceptions to WNL  Swallowing: Oral Dysphagia, Pharyngeal Dysphagia, Aspiration Risk, Esophageal Dysphagia  Diet Recommendations: Level 1/HeroneAsh  Discharge Recommendations:  (pending pt progress)       • Estimated Discharge: To be determined in team conference    DVT prophylaxis  • On Eliquis     Pain  • Cymbalta 60 mg twice daily  • Gabapentin 200 mg twice daily  • Morphine MS Contin 15 mg twice daily  • Acetaminophen 650 mg every 6 hours as needed     Bladder plan  • Continent     Bowel plan  • Continent  • Last bowel movement 3/9     Code Status  • Level 1 full code      * Acute CVA (cerebrovascular accident) [facility])  Assessment & Plan  3/8 - neuro exam stable - continue to monitor closely   CT head 3/7 - Evolving right MCA territory infarcts with associated petechial hemorrhage which is best seen on the recent MRI examination  There is no discrete parenchymal hematoma  - discussed above with johny Hull - no acute bleeding noted   MRI brain 2/22 - 1    New small acute /more recent interval lacunar infarction in the right cerebral peduncle (series 4, image 14)  2   Extensive multifocal large, previously present right middle cerebral artery infarction which is evolving with superimposed areas of evolving hemorrhage likely related to hemorrhagic transformation of subacute right MCA infarction  No significant   mass effect  3   Mild, chronic microangiopathy  S/P thrombectomy 2/12 by Dr Allison Petit   Currently on a dysphagia 1 diet with thin liquids  Secondary stroke prophylaxis at this time with Eliquis 5 mg twice daily (aspirin d/c'd earlier); and statin; optimal BP control   PT, OT, SLP  Consideration for neuropsychology if patient agreeable  Review modifiable risk factors and provide stroke education  Monitor for poststroke pain  Close neuro follow-up as patient has had multiple strokes in the last 2 months    Hypokalemia  Assessment & Plan  Hypokalemia with potassium down to 3 4 given 40 mill equivalents today on 3/10    Wound of left ankle  Assessment & Plan  On lateral ankle possibly occurred at time of fall  - Wound care c/s  1  Cleanse L lateral ankle wound with NSS, pat dry, and apply silvasorb gel to the wound bed  Cover with Allevyn foam dressing  Isacc dressing with T  Change every day and as needed for soilage/dislodgement  2  Preventative nursing skin care orders placed  3   Pressure relief, turning and repositioning with foam offloading wedges and waffle cushion to the care  Prevalon boot     Dysphagia  Assessment & Plan  - Current diet: Modified - D1, NTL after failed VBS, GI consulted, plan for esophogram, protonix added  - Recommend VBS study-VBS completed on 3/9 with evidence for esophageal dysmotility and dysphagia recommending puréed with nectar thick, okay for ice chips  - SLP evaluate and treat decline in swallowing   - Aspiration precautions; HOB completely upright when eating, 100% supervision, monitor for pocketing  - Hold oral intake if patient too confused or lethargic and notify MD   - Oral care with meals and bedtime  - Ensure adequate hydration  - Nutrition consult to assist with management   - Rehab nursing and staff to ensure safe and appropriate feeding to decrease risk of aspiration     - Rehab physician oversight dysphagia management and risks associated with it  - Monitor for appropriate fluid and nutrition intake      Gout of foot  Assessment & Plan  Much improved on prednisone  Uric acid wnl   R 1st MTPJ redness and focal tenderness without significant edema or skin breakdown   - IM feels most c/w gout and have ordered prednisone - taper thru 3/12  - Monitor for improvement or non-improvement and other etiologies as well closely     AMS (altered mental status)  Assessment & Plan  3/8 Patient mentation fairly stable today - continue to monitor   3/7 Patient with some sleepiness at times apparently last several days with poor activity tolerance and now s/p fall - appears back to recent baseline on follow-up eval   CT head 3/7 - Evolving right MCA territory infarcts with associated petechial hemorrhage which is best seen on the recent MRI examination  There is no discrete parenchymal hematoma   - discussed above with radiologist Della - no acute bleeding noted   - Ensure adequate nutrition and hydration with altered diet and sub-optimal intake BUN/Cr acceptable   - Continue Eliquis, monitor vitals closley  - Comgmt with IM team   - Patient afebrile; CBC wnl; LFTs wnl; Ammonia wnl  - Continue Abx of UTI   - Calcium elevated > optimal mgmt per endo > adding Sensipar   - Hx of SHAR - continue 2L at night O2 with spot checks - refusing CPAP - obtain noct ox on 2L   - Hold standing long-acting MS Contin 15mg Q12H and transitioned to MS IR 7 5mg Q6H PRN   - Gabapentin 100mg BID weaned down recently            UTI (urinary tract infection)  Assessment & Plan  UA last week with culture growing greater than 100,000 CFU Morganella and Serratia  Started on Ciprofloxacin but transitioned to Cefpodixime per prior providers with plan to continue into 3/10   Dysuria improving     History of stroke  Assessment & Plan  Patient with recent admission in early 2023 for right MCA CVA with petechial hemorrhage and residual left-sided weakness and dysarthria  Patient had mechanical thrombectomy with Dr Jose Acosta on 2/12    Obstructive sleep apnea  Assessment & Plan  Patient refusing CPAP 2/2 claustrophobia - refuses home or hospital CPAP   2L O2 at night with spot checks   Obtain noct ox on 2L; repeat noct ox prior to d/c for possible home DME     History of bacteremia  Assessment & Plan  Unclear etiology initially of whether her altered mental status was related to stroke versus polypharmacy versus opioid overdose versus infection versus seizure  1/2 micrococcus with a second set staph coag negative and followed by infectious disease after course of antibiotic treatment  Has been off of antibiotics and repeat blood cultures negative to date, ID felt may be contaminant    Discoloration of skin of foot  Assessment & Plan  N/V intact - no significant discoloration today   Per daughter and patient patient's foot will become blue or red and change colors frequently  No evidence for infectious etiology and had a lower extremity arterial duplex with no evidence of any significant arterial occlusive disease  May be related to the seronegative spondyloarthropathies  Likely the source of of the inaccurate and inconsistent pulse oximetry readings in the hands as well    Hypercalcemia  Assessment & Plan  Nephrology had been following, nonsuppressed PTH with concerns for primary hyperparathyroidism - they defer to endo on mgmt   C spine XR 3/7 - Salt-and-pepper appearance of the skull is partially accentuated by the image processing /sharpening algorithm, though prior head CT suggests this is a true finding    Correlate for clinical/laboratory features of hyperparathyroidism; Salt-and-pepper "appearance of the skull accentuated by image processing/sharpening algorithm  However, prior head CT shows a mottled appearance of the skull as well suggesting hyperparathyroidism  No focal lytic or blastic lesions  No acute fractures  Endocrine consult   \"SPEP-no monoclonal bands, UPEP- no monoclonal bands, PTH RP less than 2, 25-hydroxy vitamin D 41 7, 1,25 hydroxy vitamin D 23 7 low, Phos 2 5 low, PTH 65 4   Assessment/Plan:  1  Hypercalcemia: Suspected to be on the basis of primary hyperparathyroidism: Corrected calcium continues to increase slightly  Sensipar 30 mg daily was started and is being tolerated  Given history of osteoporosis, if appropriate surgical candidate consider surgery referral as outpatient  Follow-up with endocrinology as outpatient  Monitor corrected calcium level  Adjust dose as needed  Status post a CT of the chest abdomen and pelvis with no overt malignancy  - Patient has allergy to contrast - discussed with endo fellow  -Calcium on 3/10 is at 10 2, continue to periodically monitor for effectiveness of medications      HTN (hypertension)  Assessment & Plan  Patient with some orthostasis  IM consulted and with overall management at their discretion during ARC course  Monitor orthostatics; PRN ADDY hose, abdominal binder - ensure adequate hydration   Currently on metoprolol succinate 25 mg twice daily with holding parameters   Plan per cardiology did not add an ARB in the setting of heart failure however as blood pressure allows    Bipolar depression ([facility] [address] )  Assessment & Plan  Currently on Wellbutrin 100 mg twice daily, Lamictal 150 mg nightly, and Cymbalta 60 mg twice daily  Consistent with home regimen  Continue monitoring for stability, mood lability  Consider neuropsychology consultation if patient agreeable    Fall  Assessment & Plan  3/7 early morning - per report \"unwitnessed FALL; Per RN patient set off bed alarm, but has already fallen onto her left side    Confused and " "wanting to wash her face  Once more awake, mentation improved  \"  - CTH evolving infarct without new bleeding per rads  - L knee mild pain but good ROM without significant edema or instability > improving - XR unremarkable - monitor   - Mild L hip pain but good ROM and can adequately WB > improving - L hip/pelvis XR - mild hip OA without acute findings -  monitor  - Some L shoulder pain - some mildly decreased AROM, +Muñoz > stable -  L shoulder XR - unremarkable - monitor    - Subtle neck pain but full ROM without radicular complaints or focal weakness, sensory changes > improving - C spine XR - no acute findings or change from prior - chronic changes > Chronic hyperkyphosis  Anterolisthesis at C4-5 is unchanged  No acute subluxation; Salt-and-pepper appearance of the skull accentuated by image processing/sharpening algorithm  Multilevel moderate to severe discogenic and uncovertebral degenerative changes greatest at C5-6 and C6-7  Multilevel mild facet degenerative changes greatest at C4-5  > monitor   - Optimal mgmt with wakefulness/AMS - continue 2L at night O2 with spot checks - refusing CPAP  - Continue 1:1 for safety     Cervical spondylosis  Assessment & Plan  C spine XR 3/7 - Chronic hyperkyphosis  Anterolisthesis at C4-5 is unchanged  No acute subluxation; Salt-and-pepper appearance of the skull accentuated by image processing/sharpening algorithm  However, prior head CT shows a mottled appearance of the skull as well suggesting hyperparathyroidism  No focal lytic or blastic lesions  No acute fractures  Multilevel moderate to severe discogenic and uncovertebral degenerative changes greatest at C5-6 and C6-7    Multilevel mild facet degenerative changes greatest at C4-5   - No signs of myelopathy or radiculopathy - monitor     Osteoporosis  Assessment & Plan  History of an L1 compression fracture status post kyphoplasty  Was previously on Prolia has not been on since 2020  Outpatient referral to " endocrine    CHF (congestive heart failure) ([facility])  Assessment & Plan  Wt Readings from Last 3 Encounters:   03/08/23 59 7 kg (131 lb 9 8 oz)   02/16/23 62 6 kg (138 lb)   02/12/23 67 9 kg (149 lb 11 1 oz)       Cardiomyopathy with reduced ejection fraction as low as 10 to 15% back in 2004 but has improved since and most recently was 25 to 30% on last admission  CXR 3/7 unremarkable; does not appear volume overloaded   Patient was offered LifeVest but declined  On metoprolol succinate 25 mg twice daily, with plan to add an ARB as her blood pressure would allow  Strict I's and O's and daily weights  Follows with Dr Tiki [facility] at [facility]      Seronegative arthropathy of multiple sites [facility])  [address] with rheumatology and is on Plaquenil 200 mg Monday through Friday and 400 mg on Saturday Sunday  Continue home regimen and follow-up with rheumatology on discharge    Asthma  Assessment & Plan  3/8 saturating well on RA/2L   3/7 afternoon - patient had questionable desat with therapy but had full improved mentation and was in no resp or other distress at that time  - Comgmt with IM who also evaluated patient at this time    - She historically has had difficulty obtaining O2 from finger tips at times - when pulse ox moved to toe she had good oxygenation on RA-2L  - CXR no acute findings - bibasilar scarring   - Continue use of albuterol inhaler 2 puffs every 6 hours as needed    Chronic pain syndrome  Assessment & Plan  History of chronic pain secondary to compression fractures in her thoracic spine and chronic rib fractures   Patient with over sleepiness at times at sedating meds decreased some recently   · Holding standing long-acting MS Contin 15mg Q12H and transitioned to MS IR 7 5mg Q6H PRN > pain still adequately controlled > monitor closely   · Gabapentin 100mg BID   · Cymbalta 60 milligrams twice daily  · Acetaminophen 650 mg every 6 hours as needed  · Was previously on oxycodone as needed at [facility] but per family due to concerns for polypharmacy and additional opioids beyond her morphine has not been on an acute care since 2/16  · Lidoderm and Bengay  Aqua K also ordered with instructions not to place over same area  Appreciate IM consultants medical co-management  Labs, medications, and imaging personally reviewed  ROS:  A ten point review of systems was completed on 03/10/23 and pertinent positives are listed in subjective section  All other systems reviewed were negative  OBJECTIVE:   /80   Pulse (!) 132 Comment: RN & Ramonita aware  Temp 97 5 °F (36 4 °C) (Oral)   Resp 20   Ht 5' 3\" (1 6 m)   Wt 59 7 kg (131 lb 9 8 oz)   SpO2 94%   BMI 23 31 kg/m²     Physical Exam  Vitals and nursing note reviewed  Constitutional:       General: She is not in acute distress  HENT:      Head: Atraumatic  Comments: Left-sided facial droop     Right Ear: External ear normal       Left Ear: External ear normal       Nose: Nose normal  No rhinorrhea  Mouth/Throat:      Mouth: Mucous membranes are moist       Pharynx: Oropharynx is clear  Eyes:      General: No scleral icterus  Cardiovascular:      Rate and Rhythm: Normal rate  Pulses: Normal pulses  Heart sounds: Normal heart sounds  Pulmonary:      Effort: Pulmonary effort is normal  No respiratory distress  Breath sounds: Normal breath sounds  No wheezing  Abdominal:      General: There is no distension  Palpations: Abdomen is soft  Musculoskeletal:      Right lower leg: No edema  Left lower leg: No edema  Skin:     General: Skin is warm and dry  Neurological:      Mental Status: She is oriented to person, place, and time        Comments: Left-sided hemiparesis and dysmetria with mild to moderate dysarthria   Psychiatric:         Mood and Affect: Mood normal          Behavior: Behavior normal           Lab Results   Component Value Date    WBC 9 35 03/07/2023    HGB 14 7 " 03/07/2023    HCT 46 1 03/07/2023    MCV 98 03/07/2023     03/07/2023     Lab Results   Component Value Date    SODIUM 146 03/10/2023    K 3 4 (L) 03/10/2023     (H) 03/10/2023    CO2 26 03/10/2023    BUN 12 03/10/2023    CREATININE 1 00 03/10/2023    GLUC 86 03/10/2023    CALCIUM 10 2 (H) 03/10/2023     Lab Results   Component Value Date    INR 0 95 02/22/2023    INR 0 90 02/12/2023    INR 1 12 04/22/2022    PROTIME 12 8 02/22/2023    PROTIME 12 1 02/12/2023    PROTIME 14 0 04/22/2022           Current Facility-Administered Medications:   •  acetaminophen (TYLENOL) tablet 650 mg, 650 mg, Oral, Q6H PRN, Raymond Wilton, DO, 650 mg at 03/08/23 1327  •  albuterol (PROVENTIL HFA,VENTOLIN HFA) inhaler 2 puff, 2 puff, Inhalation, Q6H PRN, Raymond Wilton, DO, 2 puff at 03/07/23 1358  •  aluminum-magnesium hydroxide-simethicone (MYLANTA) oral suspension 30 mL, 30 mL, Oral, Q4H PRN, Raymond Wilton, DO, 30 mL at 03/08/23 1734  •  apixaban (ELIQUIS) tablet 5 mg, 5 mg, Oral, BID, Raymond Wilton, DO, 5 mg at 03/10/23 1750  •  atorvastatin (LIPITOR) tablet 40 mg, 40 mg, Oral, QPM, Raymond Wilton, DO, 40 mg at 03/10/23 1750  •  bisacodyl (DULCOLAX) rectal suppository 10 mg, 10 mg, Rectal, Once, Michele Roman MD  •  buPROPion [facility]) tablet 100 mg, 100 mg, Oral, BID, Raymond Wilton, DO, 100 mg at 03/10/23 1751  •  cinacalcet (SENSIPAR) tablet 30 mg, 30 mg, Oral, Daily With Breakfast, Anika Moses MD, 30 mg at 03/10/23 0631  •  DULoxetine (CYMBALTA) delayed release capsule 60 mg, 60 mg, Oral, BID, Kat Núñez DO, 60 mg at 03/10/23 6170  •  gabapentin (NEURONTIN) capsule 100 mg, 100 mg, Oral, BID, Michele Roman MD, 100 mg at 03/10/23 1750  •  glycerin-hypromellose- (ARTIFICIAL TEARS) ophthalmic solution 1 drop, 1 drop, Both Eyes, Q4H PRN, Kat Núñez DO  •  hydroxychloroquine (PLAQUENIL) tablet 200 mg, 200 mg, Oral, Once per day on Mon Tue Wed Thu Fri, Carloz Parnell DO, 200 mg at 03/10/23 0631  •  hydroxychloroquine (PLAQUENIL) tablet 400 mg, 400 mg, Oral, Once per day on Sun Sat, Carloz Parnell, , 400 mg at 03/05/23 4759  •  lamoTRIgine (LaMICtal) tablet 150 mg, 150 mg, Oral, HS, Nicky Shanelle, DO, 150 mg at 03/09/23 2101  •  lidocaine (LIDODERM) 5 % patch 1 patch, 1 patch, Topical, Daily, Zee Hart MD, 1 patch at 03/10/23 0957  •  LORazepam (ATIVAN) tablet 0 25 mg, 0 25 mg, Oral, BID PRN, Alfreda Ortega MD, 0 25 mg at 03/10/23 1547  •  melatonin tablet 3 mg, 3 mg, Oral, HS, LAURA Rutledge, 3 mg at 03/09/23 2101  •  menthol-methyl salicylate (BENGAY) 38-86 % cream, , Apply externally, 4x Daily PRN, Zee Hart MD, Given at 03/06/23 1227  •  metoprolol succinate (TOPROL-XL) 24 hr tablet 25 mg, 25 mg, Oral, BID, LAURA Conley, 25 mg at 03/10/23 1750  •  morphine (MSIR) IR tablet 7 5 mg, 7 5 mg, Oral, Q6H PRN, Alfreda Ortega MD, 7 5 mg at 03/10/23 1328  •  ondansetron (ZOFRAN-ODT) dispersible tablet 4 mg, 4 mg, Oral, Q6H PRN, Nicky Timmons DO, 4 mg at 03/10/23 0836  •  [START ON 3/11/2023] pantoprazole (PROTONIX) EC tablet 40 mg, 40 mg, Oral, Early Morning, Eriberto Donnelly MD  •  polyethylene glycol (MIRALAX) packet 17 g, 17 g, Oral, Daily, Nicky Shanelle, DO, 17 g at 03/06/23 9548  •  polyethylene glycol (MIRALAX) packet 17 g, 17 g, Oral, Daily PRN, Nicky Shanelle, DO, 17 g at 03/04/23 1930  •  prazosin (MINIPRESS) capsule 1 mg, 1 mg, Oral, HS, Nicky Shanelle, DO  •  predniSONE tablet 20 mg, 20 mg, Oral, Daily, MATTI VieraMALVIN, 20 mg at 03/10/23 0950  •  senna-docusate sodium (SENOKOT S) 8 6-50 mg per tablet 1 tablet, 1 tablet, Oral, BID, Nickyisidro Timmons DO, 1 tablet at 03/10/23 8014  •  sodium chloride (OCEAN) 0 65 % nasal spray 1 spray, 1 spray, Each Nare, Q1H PRN, Dexter Tijerina MD, 1 spray at 03/08/23 0300    Past Medical History:   Diagnosis Date   • Anxiety    • Asthma    • Bipolar depression ([facility] [address] )    • Chronic narcotic dependence ([facility] [address] )    • Chronic pain    • Depression    • Enterovirus heart infection    • SHAR (obstructive sleep apnea)    • Osteoarthritis    • Peripheral neuropathy    • Plantar fasciitis of right foot    • Senile osteoporosis    • Seronegative arthropathy of multiple sites [facility])    • Undifferentiated connective tissue disease ([facility] )        Patient Active Problem List    Diagnosis Date Noted   • [facility] 02/23/2023   • Hypokalemia 03/10/2023   • AMS (altered mental status) 03/09/2023   • Gout of foot 03/09/2023   • Dysphagia 03/09/2023   • Wound of left ankle 03/09/2023   • UTI (urinary tract infection) 03/06/2023   • Obstructive sleep apnea 02/28/2023   • History of stroke 02/28/2023   • History of bacteremia 02/24/2023   • Hypercalcemia 02/22/2023   • Discoloration of skin of foot 02/22/2023   • Abdominal pain 02/22/2023   • HFrEF (heart failure with reduced ejection fraction) ([facility] ) 02/13/2023   • Stroke ([place] [address] ) 02/12/2023   • HTN (hypertension) 02/12/2023   • Cardiomyopathy ([facility] ) 02/07/2023   • Vitamin D insufficiency 02/07/2023   • LAILA (acute kidney injury) ([place] [address] ) 04/23/2022   • Polypharmacy 04/23/2022   • 'light-for-dates' infant with signs of fetal malnutrition 04/22/2022   • Bipolar depression ([facility] ) 03/04/2022   • Rheumatoid arthritis ([place] [address] ) 03/04/2022   • Closed fracture of multiple ribs of right side 03/02/2022   • Traumatic pneumothorax 03/02/2022   • Closed fracture of transverse process of lumbar vertebra ([facility] ) 03/02/2022   • Liver contusion 03/02/2022   • Fall 03/02/2022   • Acute pain due to trauma 03/02/2022   • Right shoulder pain 03/02/2022   • Severe protein-calorie malnutrition ([facility] ) 03/02/2022   • Undifferentiated connective tissue disease ([place] [address] ) 12/29/2021   • Primary generalized (osteo)arthritis 12/29/2021   • Peripheral neuropathy 12/29/2021   • Osteoporosis 12/29/2021   • Hx of compression fracture of spine 12/29/2021   • Lumbar spondylosis 12/29/2021   • Cervical spondylosis 12/29/2021   • Anemia 06/26/2021   • Chronic pain syndrome    • Asthma    • Ambulatory dysfunction    • Seronegative arthropathy of multiple sites (HCC)    • Gastroesophageal reflux disease without esophagitis    • Mixed stress and urge urinary incontinence    • CHF (congestive heart failure) ([facility] )           [place],   Physical Medicine and Priyanka Rice    I have spent a total time of 55 minutes on 03/10/23 in caring for this patient including Instructions for management, Patient and family education, Importance of tx compliance, Counseling / Coordination of care, Documenting in the medical record, Reviewing / ordering tests, medicine, procedures  , Communicating with other healthcare professionals  and Extended time spent during physical examination and history taking in order to continue to monitor her overall progress from an arousal state and cognition    Additional time spent discussing with internal medicine team as well as gastroenterology team regarding addition of new medications and management of recommendations for her esophageal with dysmotility

## 2023-03-10 NOTE — QUICK NOTE
Called by nursing due to IV infiltrating which K was infusing  Pt reports pain at the site of the infiltrate  Nursing to remove IV and apply ice to the area  Pt quite anxious and will be given Ativan  Will not try to replace K again today   BMP in AM

## 2023-03-10 NOTE — PROGRESS NOTES
Internal Medicine Progress Note  Patient: Michell Raines  Age/sex: 75 y o  female  Medical Record #: [id number]      ASSESSMENT/PLAN: (Interval History)  Michell Raines is seen and examined and management for following issues:    Recent right MCA infarct W/petechial hemorrhage  • S/P thrombectomy by Dr Negin Catalan 2/12  • Continue Eliquis 5 mg twice daily/statin   • Follow-up with neurosurgery/neurology as scheduled     NICM  • EF has been as low as 10 to 15% in the past  • In 2022 was 65%  • Now down to 30% = pt declined LifeVest  • Cardiology felt this was secondary to her recent CVA as well as possible medication noncompliance  • They recommend continuing beta-blocker and adding ARB when BP allows  • Will need outpatient ischemia work-up  • Followed by Dr Tiki Jones at [facility] however would like to follow with [facility] on dc  • Have not been able to add back ARB yet 2/2 soft BPs and cant go down on Toprol dosing since HRs go into the low 100s at times  Coreg changed to Toprol during her previous hospitalization as Coreg was not controlling her HR      Seronegative rheumatoid arthritis  • A/T rheumatology takes Plaquenil 200 mg Monday through Friday 400 mg on Saturday and Sunday  • Continue same  • Follow-up on discharge     Hypercalcemia  · Seen by renal/endocrine on acute side  · Endocrine recommends starting sensipar if corrected calcium >12  · Corrected calcium 12 on 3/6/23 labs  · Endocrine input appreciated  Will need outpt follow-up and potentially a surgical evaluation  · May possibly need imaging with contrast however will need renal input regarding pretreatment for same    Fall  · Overnight 3/6/23 onto left side  · Abrasion on left knee and Xray negative for fracture  · Could not verify whether or not she struck her head  · CT of head stable  · She also c/o L hip pain imaging pending but able to bear weight  · Fall precautions all in place  On continual observation      R great toe gout  · Started prednisone 40mg 3/7 will cont 20mg daily x 3 days  · Uric acid normal  · improved     Chronic pain  • Due to compression fractures of her thoracic spine/chronic rib fractures  • Lethargic 3/7 and MS contin changed to IR  • Pain management per PMR     Bipolar disorder  • Continue current medication regimen  • Follow-up with psychiatry on discharge  • Neuropsych to follow    Anxiety  · PMR adding small dose of xanax bid    Confusion  · On and off  · Remains on 1:1 for safety s/p fall  · Decreased prednisone d/t same     SHAR  • Does not use CPAP at home d/t claustraphobia  • OK use oxygen overnight     Dysuria/Urinary frequency/incontinence  • Urine culture +CRE  • PMR started IV Cipro 400mg every 12 hours for 5 days  • Switched to vantin 200mg bid through 3/9/23  • Symptoms improved    Hypokalemia  · K 3 4   · Oral K supplement ordered  Pt vomited after taking one bite of pudding due to tasting the K  Will replace with 20meq IV  · BMP in AM         DC planning: 3/22/23       The above assessment and plan was reviewed and updated as determined by my evaluation of the patient on 3/10/2023  Labs:   Results from last 7 days   Lab Units 03/07/23  1437 03/06/23  1224   WBC Thousand/uL 9 35 7 48   HEMOGLOBIN g/dL 14 7 13 1   HEMATOCRIT % 46 1 41 5   PLATELETS Thousands/uL 264 262     Results from last 7 days   Lab Units 03/10/23  0441 03/09/23  0610 03/08/23  0650 03/07/23  1437   SODIUM mmol/L 146  --   --  137   POTASSIUM mmol/L 3 4*  --   --  3 9   CHLORIDE mmol/L 113*  --   --  104   CO2 mmol/L 26  --   --  32   BUN mg/dL 12  --   --  18   CREATININE mg/dL 1 00  --   --  1 19   CALCIUM mg/dL 10 2* 10 3*   < > 12 0*    < > = values in this interval not displayed               Results from last 7 days   Lab Units 03/07/23  1358   POC GLUCOSE mg/dl 127       Review of Scheduled Meds:  Current Facility-Administered Medications   Medication Dose Route Frequency Provider Last Rate   • acetaminophen  650 mg Oral Q6H PRN Illona Erp, DO     • albuterol  2 puff Inhalation Q6H PRN Illona Erp, DO     • aluminum-magnesium hydroxide-simethicone  30 mL Oral Q4H PRN Illona Erp, DO     • apixaban  5 mg Oral BID Illona Erp, DO     • atorvastatin  40 mg Oral QPM Illona Erp, DO     • bisacodyl  10 mg Rectal Once Toi Lemon MD     • buPROPion  100 mg Oral BID Illona Erp, DO     • cinacalcet  30 mg Oral Daily With Breakfast Anika Mar MD     • DULoxetine  60 mg Oral BID Illona Erp, DO     • gabapentin  100 mg Oral BID Toi Lemon MD     • glycerin-hypromellose-  1 drop Both Eyes Q4H PRN Illona Erp, DO     • hydroxychloroquine  200 mg Oral Once per day on  Illona Erp, DO     • hydroxychloroquine  400 mg Oral Once per day on Sun Sat Illona Erp, DO     • lamoTRIgine  150 mg Oral HS Illona Erp, DO     • lidocaine  1 patch Topical Daily Marcia Tan MD     • LORazepam  0 25 mg Oral BID PRN Toi Lemon MD     • melatonin  3 mg Oral HS LAURA Acevedo     • menthol-methyl salicylate   Apply externally 4x Daily PRN Marcia Tan MD     • metoprolol succinate  25 mg Oral BID LAURA Howard     • morphine  7 5 mg Oral Q6H PRN Toi Lemon MD     • ondansetron  4 mg Oral Q6H PRN Illona Erp, DO     • polyethylene glycol  17 g Oral Daily Illona Erp, DO     • polyethylene glycol  17 g Oral Daily PRN Illona Erp, DO     • predniSONE  20 mg Oral Daily LAURA Howard     • senna-docusate sodium  1 tablet Oral BID Illona Erp, DO     • sodium chloride  1 spray Each Nare Q1H PRN Francois Hobson MD         Subjective/ HPI: Patient seen and examined  Patients overnight issues or events were reviewed with nursing or staff during rounds or morning huddle session  New or overnight issues include the following:     Pt seen in her room  She vomited after taking KCl  She reports not sleeping well overnight   She denies any other complaints  ROS:   A 10 point ROS was performed; negative except as noted above  Imaging:     FL barium swallow video w speech   Final Result by JAMI HARKINS (03/09 1306)      FL barium swallow video w speech   Final Result by  (03/09 1550)      XR hip/pelv 2-3 vws left if performed   Final Result by Toni Chan MD (03/08 1633)      No acute osseous abnormality  Workstation performed: [id number]         XR shoulder 2+ vw left   Final Result by Toni Chan MD (03/08 1635)      No acute osseous abnormality  Workstation performed: [id number]         XR spine cervical 2 or 3 vw injury   Final Result by Toni Chan MD (03/08 1643)      No acute osseous abnormality or significant change from priors  Salt-and-pepper appearance of the skull is partially accentuated by the image processing /sharpening algorithm, though prior head CT suggests this is a true finding  Correlate for clinical/laboratory features of hyperparathyroidism  Workstation performed: [id number]         XR chest portable   Final Result by Matthew Tapia MD (03/07 1650)      No acute cardiopulmonary disease  Workstation performed: [id number]         CT head wo contrast   Final Result by Rudy Suh MD (03/07 1358)      Evolving right MCA territory infarcts with associated petechial hemorrhage which is best seen on the recent MRI examination  There is no discrete parenchymal hematoma  Workstation performed: [id number]         XR knee 1 or 2 vw left   Final Result by Matthew Tapia MD (03/07 1537)      No acute osseous abnormality              Workstation performed: [id number]             *Labs /Radiology studies reviewed  *Medications reviewed and reconciled as needed  *Please refer to order section for additional ordered labs studies  *Case discussed with primary attending during morning huddle case rounds    Physical Examination:  Vitals:   Vitals:    03/09/23 1524 03/09/23 2008 03/09/23 2207 03/10/23 0500   BP: 102/64 122/87  128/77   BP Location: Right arm Right arm  Right arm   Pulse: (!) 109 (!) 107  100   Resp: 18 20  20   Temp: 97 8 °F (36 6 °C) 98 7 °F (37 1 °C)  98 3 °F (36 8 °C)   TempSrc: Oral Oral  Oral   SpO2: 96% 97% 98% 97%   Weight:       Height:           GEN: No apparent distress, anxious and frail  NEURO: Alert and oriented x3; left neglect, cognitive issues as well as recall issues, mild dysarthria; +confusion on and off  HEENT: Pupils are equal and reactive, EOMI, mucous membranes are moist, face asymmetrical  CV: S1 S2 regular, no MRG, no peripheral edema noted  RESP: Lungs are clear bilaterally, no wheezes, rales or rhonchi noted, on room air, respirations easy and non labored  GI: Flat, soft non tender, non distended; +BS x4  : Voiding without difficulty, incontinent at times  MUSC: Moves all extremities; left hemiparesis; right great toe with improved erythema and less painful  SKIN: pink, warm and dry, poor turgor, b/l LE with dressings in place for both prevention and d/t new wound refer to media         The above physical exam was reviewed and updated as determined by my evaluation of the patient on 3/10/2023  Invasive Devices     Peripheral Intravenous Line  Duration           Peripheral IV 03/08/23 Dorsal (posterior); Right Forearm 1 day          Drain  Duration           External Urinary Catheter 9 days                   VTE Pharmacologic Prophylaxis: Eliquis  Code Status: Level 1 - Full Code  Current Length of Stay: 10 day(s)      Total time spent:  30 minutes with more than 50% spent counseling/coordinating care  Counseling includes discussion with patient re: progress  and discussion with patient of his/her current medical state/information  Coordination of patient's care was performed in conjunction with primary service   Time invested included review of patient's labs, vitals, and management of their comorbidities with continued monitoring  In addition, this patient was discussed with medical team including physician and advanced extenders  The care of the patient was extensively discussed and appropriate treatment plan was formulated unique for this patient  Medical decision making for the day was made by supervising physician unless otherwise noted in their attestation statement  ** Please Note:  voice to text software may have been used in the creation of this document   Although proof errors in transcription or interpretation are a potential of such software**

## 2023-03-10 NOTE — PROGRESS NOTES
03/10/23 0700   Pain Assessment   Pain Assessment Tool 0-10   Pain Score No Pain   Restrictions/Precautions   Precautions 1:1;Aspiration;Bed/chair alarms;Cognitive; Fall Risk;Impulsive;Contact/isolation;O2;Supervision on toilet/commode;Visual deficit  (Remained on RA for Tx session with O2 >94%)   Weight Bearing Restrictions No   ROM Restrictions No   Lifestyle   Autonomy Pt agreeable to OT Tx session  Eating   Type of Assistance Needed Supervision   Physical Assistance Level No physical assistance   Comment PCA present to provide S/U A with meal tray and S for eating task  Eating CARE Score 4   Oral Hygiene   Type of Assistance Needed Supervision;Verbal cues   Physical Assistance Level No physical assistance   Comment S with S/U A for brushing teeth and rinsing mouth utilizing RUE   Oral Hygiene CARE Score 4   Shower/Bathe Self   Type of Assistance Needed Physical assistance;Verbal cues   Physical Assistance Level 51%-75%   Comment Performed SB routine seated at sink with pt demo ability to bathe 7/10 parts  Combination of xleg technique and dynamic reach to bathe LE's to feet  Assist to bathe RUE 2* to dec coordination in LUE  Assist to thoroughly bathe santiago and buttocks while in stance at sink top 2* to dec fxnl standing balance  Multiple cues for sequencing through bathing routine, attend to task at hand, and promote use of LUE  Shower/Bathe Self CARE Score 2   Bathing   Assessed Bath Style Sponge Bath   Able to Gather/Transport No   Able to [employer] Temperature No   Able to Wash/Rinse/Dry (body part) Left Arm;L Upper Leg;R Upper Leg;L Lower Leg/Foot;R Lower Leg/Foot;Chest;Abdomen   Limitations Noted in Balance; Coordination; Endurance; Safety;Strength;Timeliness; Sequencing   Positioning Seated;Standing   Tub/Shower Transfer   Reason Not Assessed Sponge Bath;Safety   Upper Body Dressing   Type of Assistance Needed Physical assistance;Verbal cues   Physical Assistance Level 51%-75%   Comment Increased time to manage LUE into sleeve with vc's to fully thread LUE prior to managing RUE  Assist to fully manage LUE past elbow and don OH  Upper Body Dressing CARE Score 2   Lower Body Dressing   Type of Assistance Needed Physical assistance   Physical Assistance Level 76% or more   Comment TA to manage Depends  Increased time to thread LE's, req multiple cues to achieve correct orientation of pants  Initiates CM in stance, however, requires Assist to complete CM 2* to dec coordination in LUE and dec standing balance  Cues to utilize mirror for visual feedback of position of LUE during CM, however, pt unable to complete  Lower Body Dressing CARE Score 2   Putting On/Taking Off Footwear   Type of Assistance Needed Physical assistance   Physical Assistance Level 76% or more   Comment Xleg technique to doff socks  Assist to fully don B/L slipper socks  Putting On/Taking Off Footwear CARE Score 2   Lying to Sitting on Side of Bed   Type of Assistance Needed Supervision   Physical Assistance Level No physical assistance   Comment HOB slightly elevated   Lying to Sitting on Side of Bed CARE Score 4   Sit to Stand   Type of Assistance Needed Physical assistance   Physical Assistance Level 26%-50%   Comment STS with HHA   Sit to Stand CARE Score 3   Bed-Chair Transfer   Type of Assistance Needed Physical assistance   Physical Assistance Level 51%-75%   Comment Consistently modA x1 SPT EOB <> recliner, EOB <> W/C with tactile cues for LUE placement and to ensure pt fully turns prior to descending to chair surface  Communicated to PT and pt upgraded to A x1 for fxnl transfer's  Chair/Bed-to-Chair Transfer CARE Score 2   Toileting Hygiene   Type of Assistance Needed Physical assistance   Physical Assistance Level 51%-75%   Comment Pt able to complete santiago hygiene while seated on over-the-toilet commode   Assist with CM down/up   Toileting Hygiene CARE Score 2   Toilet Transfer   Type of Assistance Needed Physical "assistance; Adaptive equipment   Physical Assistance Level 51%-75%   Comment SPT W/C <> over-the-toilet commode with cues for placement  Increased A as pt attemtping to descend to W/C prior to being safely positioned against W/C   Toilet Transfer CARE Score 2   Cognition   Overall Cognitive Status Impaired   Arousal/Participation Alert; Cooperative   Attention Attends with cues to redirect   Orientation Level Oriented to person;Oriented to place; Disoriented to time;Disoriented to situation   Memory Decreased short term memory;Decreased recall of recent events;Decreased recall of precautions   Following Commands Follows one step commands with increased time or repetition   Activity Tolerance   Activity Tolerance Patient tolerated treatment well   Other Comments   Assessment /75 (Supine), 131/70 (Seated)  HR ranging between 108-122 BPM; O2 remained >92% on RA  Assessment   Treatment Assessment Pt seen x 90 min skilled OT Tx session with focus on ADL retraining, ADL of toileting, and fxnl transfer's  See above for further Tx details  Pt overall req modA for bathing routine and UB dressing, and maxA for LB dressing  Pt upgraded from A x2 to [address] x1  for SPT's  Communicated to PCA and Therapy team  When asked what pt would do if she needed to utilize the bathroom, pt responded \"I would ring my bell\" and pointed to call bell  Pt continues to be limited by LUE dec coordination, LUE neglect, dec problem solving, dec fxnl standing balance  O2 remained > 92% on RA throughout Tx session  BP WFL (See vitals flow sheet)  Pt assisted into recliner with chair alarm engaged and 1:1 present at end of Tx session  Pt would continue to benefit from skilled OT services to continue LUE NM Re-ed, improve fxnl standing balance, and maximize IND with ADL transfers and performance in order to progress towards OT goals and dec caregiver burden upon D/C  Anticipated plan for pt to transition to SNF for cont'd rehab services     Prognosis " Fair   Problem List Decreased strength;Decreased range of motion;Decreased endurance; Impaired balance;Decreased mobility; Decreased cognition;Decreased coordination; Impaired judgement;Decreased safety awareness; Impaired vision   Barriers to Discharge Inaccessible home environment;Decreased caregiver support   Plan   Treatment/Interventions ADL retraining;Functional transfer training; Therapeutic exercise; Endurance training;Cognitive reorientation   Progress Progressing toward goals   Recommendation   OT Discharge Recommendation Post acute rehabilitation services   OT Therapy Minutes   OT Time In 0700   OT Time Out 0830   OT Total Time (minutes) 90   OT Mode of treatment - Individual (minutes) 90   OT Mode of treatment - Concurrent (minutes) 0   OT Mode of treatment - Group (minutes) 0   OT Mode of treatment - Co-treat (minutes) 0   OT Mode of Treatment - Total time(minutes) 90 minutes   OT Cumulative Minutes 840   Therapy Time missed   Time missed?  No

## 2023-03-11 LAB
ANION GAP SERPL CALCULATED.3IONS-SCNC: 5 MMOL/L (ref 4–13)
BUN SERPL-MCNC: 11 MG/DL (ref 5–25)
CALCIUM SERPL-MCNC: 10 MG/DL (ref 8.3–10.1)
CHLORIDE SERPL-SCNC: 111 MMOL/L (ref 96–108)
CO2 SERPL-SCNC: 29 MMOL/L (ref 21–32)
CREAT SERPL-MCNC: 0.96 MG/DL (ref 0.6–1.3)
GFR SERPL CREATININE-BSD FRML MDRD: 60 ML/MIN/1.73SQ M
GLUCOSE SERPL-MCNC: 97 MG/DL (ref 65–140)
POTASSIUM SERPL-SCNC: 3.4 MMOL/L (ref 3.5–5.3)
SODIUM SERPL-SCNC: 145 MMOL/L (ref 135–147)

## 2023-03-11 RX ORDER — POTASSIUM CHLORIDE 20 MEQ/1
40 TABLET, EXTENDED RELEASE ORAL ONCE
Status: COMPLETED | OUTPATIENT
Start: 2023-03-11 | End: 2023-03-11

## 2023-03-11 RX ORDER — POTASSIUM CHLORIDE 20 MEQ/1
40 TABLET, EXTENDED RELEASE ORAL DAILY
Status: DISPENSED | OUTPATIENT
Start: 2023-03-11 | End: 2023-03-13

## 2023-03-11 RX ORDER — POTASSIUM CHLORIDE 20MEQ/15ML
40 LIQUID (ML) ORAL DAILY
Status: DISCONTINUED | OUTPATIENT
Start: 2023-03-11 | End: 2023-03-11

## 2023-03-11 RX ADMIN — SENNOSIDES AND DOCUSATE SODIUM 1 TABLET: 8.6; 5 TABLET ORAL at 16:53

## 2023-03-11 RX ADMIN — METOPROLOL SUCCINATE 25 MG: 25 TABLET, EXTENDED RELEASE ORAL at 16:53

## 2023-03-11 RX ADMIN — ONDANSETRON 4 MG: 4 TABLET, ORALLY DISINTEGRATING ORAL at 09:25

## 2023-03-11 RX ADMIN — MORPHINE SULFATE 7.5 MG: 15 TABLET ORAL at 16:53

## 2023-03-11 RX ADMIN — PANTOPRAZOLE SODIUM 40 MG: 40 TABLET, DELAYED RELEASE ORAL at 05:14

## 2023-03-11 RX ADMIN — GABAPENTIN 100 MG: 100 CAPSULE ORAL at 08:21

## 2023-03-11 RX ADMIN — DULOXETINE HYDROCHLORIDE 60 MG: 60 CAPSULE, DELAYED RELEASE ORAL at 21:16

## 2023-03-11 RX ADMIN — LIDOCAINE 5% 1 PATCH: 700 PATCH TOPICAL at 08:29

## 2023-03-11 RX ADMIN — BUPROPION HYDROCHLORIDE TABLETS 100 MG: 100 TABLET, FILM COATED ORAL at 16:55

## 2023-03-11 RX ADMIN — CINACALCET 30 MG: 30 TABLET, FILM COATED ORAL at 08:22

## 2023-03-11 RX ADMIN — PREDNISONE 20 MG: 20 TABLET ORAL at 08:21

## 2023-03-11 RX ADMIN — METOPROLOL SUCCINATE 25 MG: 25 TABLET, EXTENDED RELEASE ORAL at 08:21

## 2023-03-11 RX ADMIN — HYDROXYCHLOROQUINE SULFATE 400 MG: 200 TABLET ORAL at 08:29

## 2023-03-11 RX ADMIN — BUPROPION HYDROCHLORIDE TABLETS 100 MG: 100 TABLET, FILM COATED ORAL at 08:22

## 2023-03-11 RX ADMIN — GABAPENTIN 100 MG: 100 CAPSULE ORAL at 16:53

## 2023-03-11 RX ADMIN — PRAZOSIN HYDROCHLORIDE 1 MG: 1 CAPSULE ORAL at 21:16

## 2023-03-11 RX ADMIN — ATORVASTATIN CALCIUM 40 MG: 40 TABLET, FILM COATED ORAL at 16:53

## 2023-03-11 RX ADMIN — POLYETHYLENE GLYCOL 3350 17 G: 17 POWDER, FOR SOLUTION ORAL at 08:21

## 2023-03-11 RX ADMIN — APIXABAN 5 MG: 5 TABLET, FILM COATED ORAL at 16:54

## 2023-03-11 RX ADMIN — APIXABAN 5 MG: 5 TABLET, FILM COATED ORAL at 08:22

## 2023-03-11 RX ADMIN — MORPHINE SULFATE 7.5 MG: 15 TABLET ORAL at 10:06

## 2023-03-11 RX ADMIN — DULOXETINE HYDROCHLORIDE 60 MG: 60 CAPSULE, DELAYED RELEASE ORAL at 08:21

## 2023-03-11 RX ADMIN — POTASSIUM CHLORIDE 40 MEQ: 1500 TABLET, EXTENDED RELEASE ORAL at 10:54

## 2023-03-11 RX ADMIN — LORAZEPAM 0.25 MG: 0.5 TABLET ORAL at 10:54

## 2023-03-11 RX ADMIN — SENNOSIDES AND DOCUSATE SODIUM 1 TABLET: 8.6; 5 TABLET ORAL at 08:21

## 2023-03-11 RX ADMIN — LAMOTRIGINE 150 MG: 100 TABLET ORAL at 21:17

## 2023-03-11 RX ADMIN — MELATONIN 3 MG: at 21:17

## 2023-03-11 NOTE — NURSING NOTE
"RN spoke to [place] on phone and gave update per pt request   Pt brother was visiting and had concerns that pt was not eating enough  Brother encouraged staff to give condiments, and break meals up into more frequency  Educated that speech is working closely with her and the Jak Stallings is also working on the case  Wanted to make sure \"everyone\" was notified and on the same page     "

## 2023-03-11 NOTE — PROGRESS NOTES
Physical Medicine and Rehabilitation Progress Note  Eli Rao 75 y o  female MRN: [id number]  Unit/Bed#: [facility] 453-01 Encounter: [id number]    Chief Complaint: L IV infiltrated during potassium administration last night  Interval History:  Last night with L arm pain after IV infiltrated  That pain has resolved today and skin looks fine  Patient willing to try potassium, if it is cut in quarters and in whole pudding  She also asked that a banana or two be ordered for her each day  She really has minimal pain and has not been using her IR morphine that much  Has been persistently tachycardic with normal BP  Has gotten metoprolol last day  No sweating, malaise, diaphoresis  MS Contin discontinued earlier this week due  Last BM 3/9  Assessment/Plan - Continue plan of care as per primary attending, unless otherwise stated below:      · R MCA CVA: Continue eliquis and statin  RF modification/education  PT/OT/SLP  · Tachycardia: Has been tachycardic the past two days with normal BP  Asymptomatic  Giving potassium today to get K closer to 4 and again tomorrow  Discussed with IM who will also monitor for withdrawal symptoms given recent stop of her MS Contin and has very rarely taken her morphine IR since then  Symptomatic management for now and continue current dose of Toprol per IM  She is on eliquis already for her CVA  If persistent, check EKG  · Dysphagia: SLP, Aspiration precautions  On Level 1/NTL  · Hypokalemia: Could not get all her infusion yesterday due to infiltration  Skin looks fine and pain has resolved  Will give her 40 mEq cut (not crushed) in pudding today and more tomorrow  Recheck on 3/13  If still low consider checking Mag  · AMS: Mental status appears stable at this time  · Pain: Continue current regimen  · DVT PPx:  Fully anticoagulated on Eliquis  · Bowel/Bladder: continent of bowel/bladder  Last BM 3/11       Total visit time: 35 minutes, with more than 50% spent counseling/coordinating care  Counseling includes discussion with patient re: progress in therapies, functional issues observed by therapy staff, and discussion with patient regarding their current medical state and wellbeing  Coordination of patient's care was performed in conjunction with Internal Medicine service to monitor patient's labs, vitals, and management of their comorbidities  Sherry Yousif MD  Physical Medicine and Rehabilitation      Review of Systems: A 10 point review of systems was negative except for what is noted in the HPI          Current Facility-Administered Medications:   •  acetaminophen (TYLENOL) tablet 650 mg, 650 mg, Oral, Q6H PRN, Juan Manuel Credit, DO, 650 mg at 03/08/23 1327  •  albuterol (PROVENTIL HFA,VENTOLIN HFA) inhaler 2 puff, 2 puff, Inhalation, Q6H PRN, Juan Manuel Credit, DO, 2 puff at 03/07/23 1358  •  aluminum-magnesium hydroxide-simethicone (MYLANTA) oral suspension 30 mL, 30 mL, Oral, Q4H PRN, Juan Manuel Credit, DO, 30 mL at 03/08/23 1734  •  apixaban (ELIQUIS) tablet 5 mg, 5 mg, Oral, BID, Juan Manuel Credit, DO, 5 mg at 03/11/23 1709  •  atorvastatin (LIPITOR) tablet 40 mg, 40 mg, Oral, QPM, Juan Manuel Credit, DO, 40 mg at 03/10/23 1750  •  bisacodyl (DULCOLAX) rectal suppository 10 mg, 10 mg, Rectal, Once, Antoni Varghese MD  •  buPROPion [facility]) tablet 100 mg, 100 mg, Oral, BID, Juan Manuel Credit, DO, 100 mg at 03/11/23 1713  •  cinacalcet (SENSIPAR) tablet 30 mg, 30 mg, Oral, Daily With Breakfast, Anika Roberts MD, 30 mg at 03/11/23 7405  •  DULoxetine (CYMBALTA) delayed release capsule 60 mg, 60 mg, Oral, BID, Juan Manuel Credit, DO, 60 mg at 03/11/23 7763  •  gabapentin (NEURONTIN) capsule 100 mg, 100 mg, Oral, BID, Antoni Varghese MD, 100 mg at 03/11/23 0370  •  glycerin-hypromellose- (ARTIFICIAL TEARS) ophthalmic solution 1 drop, 1 drop, Both Eyes, Q4H PRN, Juan Manuel Credit, DO  •  hydroxychloroquine (PLAQUENIL) tablet 200 mg, 200 mg, Oral, Once per day on Mon Tue Wed Thu Fri, Altaf Guardado DO, 200 mg at 03/10/23 7980  •  hydroxychloroquine (PLAQUENIL) tablet 400 mg, 400 mg, Oral, Once per day on Sun Sat, Carloz Parnell DO, 400 mg at 03/05/23 3079  •  lamoTRIgine (LaMICtal) tablet 150 mg, 150 mg, Oral, HS, Alatf Guardado DO, 150 mg at 03/10/23 2105  •  lidocaine (LIDODERM) 5 % patch 1 patch, 1 patch, Topical, Daily, Aldair Camara MD, 1 patch at 03/10/23 0957  •  LORazepam (ATIVAN) tablet 0 25 mg, 0 25 mg, Oral, BID PRN, Desirae Ryan MD, 0 25 mg at 03/10/23 1547  •  melatonin tablet 3 mg, 3 mg, Oral, HS, LAURA Rutledge, 3 mg at 03/10/23 2105  •  menthol-methyl salicylate (BENGAY) 20-03 % cream, , Apply externally, 4x Daily PRN, Aldair Camara MD, Given at 03/06/23 1227  •  metoprolol succinate (TOPROL-XL) 24 hr tablet 25 mg, 25 mg, Oral, BID, LAURA Jackson, 25 mg at 03/11/23 6204  •  morphine (MSIR) IR tablet 7 5 mg, 7 5 mg, Oral, Q6H PRN, Desirae Ryan MD, 7 5 mg at 03/10/23 1328  •  ondansetron (ZOFRAN-ODT) dispersible tablet 4 mg, 4 mg, Oral, Q6H PRN, Altaf Guardado DO, 4 mg at 03/10/23 0836  •  pantoprazole (PROTONIX) EC tablet 40 mg, 40 mg, Oral, Early Morning, Mandy Vega MD, 40 mg at 03/11/23 0514  •  polyethylene glycol (MIRALAX) packet 17 g, 17 g, Oral, Daily, Altaf Guardado DO, 17 g at 03/11/23 0735  •  polyethylene glycol (MIRALAX) packet 17 g, 17 g, Oral, Daily PRN, Altaf Guardado DO, 17 g at 03/04/23 1930  •  prazosin (MINIPRESS) capsule 1 mg, 1 mg, Oral, HS, Altaf Guardado DO, 1 mg at 03/10/23 2105  •  predniSONE tablet 20 mg, 20 mg, Oral, Daily, LAURA Jackson, 20 mg at 03/11/23 0800  •  senna-docusate sodium (SENOKOT S) 8 6-50 mg per tablet 1 tablet, 1 tablet, Oral, BID, Altaf Guardado DO, 1 tablet at 03/11/23 4016  •  sodium chloride (OCEAN) 0 65 % nasal spray 1 spray, 1 spray, Each Nare, Q1H PRN, Maren Wang MD, 1 spray at 03/08/23 0300    Physical Exam:  Temp:  [97 5 °F (36 4 °C)-98 5 °F (36 9 °C)] 97 9 °F (36 6 °C)  HR:  [108-132] 117  Resp:  [18-20] 18  BP: (113-147)/(66-89) 115/68  SpO2:  [94 %-97 %] 96 %    Gen: No acute distress  HEENT: Moist mucus membranes, Normocephalic/Atraumatic  Cardiovascular: Tachycardic, sounds regular  Heme/Extr: No edema  Pulmonary: Non-labored breathing  Lungs CTAB  : No lopes  GI: Soft, non-tender, non-distended  BS+  Integumentary: Skin is warm, dry  Neuro: AAOx3, pleasant  Speech is dysarthric but intelligible and she is answering questions appropriately  L sided facial and body weakness  Psych: Normal mood and affect  Cooperative  Laboratory:  Labs reviewed  Results from last 7 days   Lab Units 03/07/23  1437 03/06/23  1224   HEMOGLOBIN g/dL 14 7 13 1   HEMATOCRIT % 46 1 41 5   WBC Thousand/uL 9 35 7 48     Results from last 7 days   Lab Units 03/11/23  0551 03/10/23  0441 03/07/23  1437 03/06/23  1039   BUN mg/dL 11 12 18 15   SODIUM mmol/L 145 146 137 138   POTASSIUM mmol/L 3 4* 3 4* 3 9 3 9   CHLORIDE mmol/L 111* 113* 104 108   CREATININE mg/dL 0 96 1 00 1 19 1 07   AST U/L  --   --  21 25   ALT U/L  --   --  18 18            Diagnostic Studies: Reviewed, no new imaging   FL barium swallow video w speech   Final Result by SYSTEMGENERATED, DOCUMENTATION (03/09 1306)      FL barium swallow video w speech   Final Result by  (03/09 1550)      XR hip/pelv 2-3 vws left if performed   Final Result by Jhonatan Baez MD (03/08 2343)      No acute osseous abnormality  Workstation performed: [id number]         XR shoulder 2+ vw left   Final Result by Jhonatan Baez MD (03/08 0485)      No acute osseous abnormality  Workstation performed: [id number]         XR spine cervical 2 or 3 vw injury   Final Result by Jhonatan Baez MD (03/08 8764)      No acute osseous abnormality or significant change from priors        Salt-and-pepper appearance of the skull is partially accentuated by the image processing /sharpening algorithm, though prior head CT suggests this is a true finding  Correlate for clinical/laboratory features of hyperparathyroidism  Workstation performed: [id number]         XR chest portable   Final Result by Riri Ordoñez MD (03/07 1650)      No acute cardiopulmonary disease  Workstation performed: [id number]         CT head wo contrast   Final Result by Humaira Nuno MD (03/07 1358)      Evolving right MCA territory infarcts with associated petechial hemorrhage which is best seen on the recent MRI examination  There is no discrete parenchymal hematoma  Workstation performed: [id number]         XR knee 1 or 2 vw left   Final Result by Riri Ordoñez MD (03/07 1537)      No acute osseous abnormality  Workstation performed: [id number]         FL barium swallow ROUTINE esophagus    (Results Pending)       ** Please Note: Fluency Direct voice to text software may have been used in the creation of this document   **

## 2023-03-11 NOTE — PROGRESS NOTES
"   03/11/23 0830   Pain Assessment   Pain Assessment Tool 0-10   Pain Score No Pain   Restrictions/Precautions   Precautions 1:1;Aspiration;Bed/chair alarms;Cognitive; Fall Risk;Contact/isolation;Pain;Supervision on toilet/commode;Visual deficit   Comprehension   Comprehension (FIM) 4 - Understands basic info/conversation 75-90% of time   Expression   Expression (FIM) 4 - Expresses basic info/needs 75-90% of time   Social Interaction   Social Interaction (FIM) 5 - Requires redirection but less than 10% of the time  Problem Solving   Problem solving (FIM) 3 - Solves basic problmes 50-74% of time   Memory   Memory (FIM) 3 - Recognizes, recalls/performs 50-74%   Speech/Language/Cognition Assessment   Treatment Assessment Pt participated in skilled ST session focusing on cognitive linguistic skills  At beginning of session, pt was assisted OOB and into the recliner chair  Pt required verbal cues to initiate and cont with sequencing of task, however, able to follow one-step commands given increased time  Pt was oriented x4 this AM and demonstrated accurate recall of LTM biographical info  Pt also became tearful and initiated a convo about feeling a \"sense of dying\" regarding all of her current medical issues  SLP provided emotional support to pt and offered pastoral care  Pt declined and requested to speak to her own therapist but acknowledged that her own therapist likely does not have privileges here in the hospital  SLP suggested seeing if she could set up a televisit if that is an option  SLP also suggested the hospital's neuropsych team of Dr Ilia Gibson making a visit this week  Pt receptive to this info  While pt more alert and lucid this AM, pt presented with increased deficits in attention, suspecting impacted by both internal and external (1:1 assisting in cleaning/setting up room) distractions   During meal, pt required frequent verbal cues for initiation and continuation of meal as she frequently would grab an " item then put it back down without eating, despite reporting that she was hungry and desired these items  During structured task, pt completed a 4-step written sequencing activity by correctly sequencing all steps in 2/4 trials  Pt required increased time for processing and to maintain attention to task, as well as moderate verbal cues to correct initial errors in sequencing  At this time, pt is recommended for further skilled SLP services to maximize overall functional speech and cognitive linguistic skills in attempts to decrease caregiver burden over time     Eating   Type of Assistance Needed Set-up / clean-up;Supervision;Verbal cues   Physical Assistance Level No physical assistance   Eating CARE Score 4   Swallow Assessment   Swallow Treatment Assessment Daily Dysphagia Tx Note      Patient Name: Andres HOWE Date: 3/11/2023        Current Risks for Dysphagia & Aspiration: Weak voicing; weak cough; dysarthria; general debilitation; new neuro event; brain injury; cognitive deficit; hx neurologic dx; positioning issues     Current Symptoms/Concerns: Cough; throat clear; with liquids; during meals; with pills; difficulty chewing; decreased oral intake; hx of dysphagia/aspiration     Current diet: puree/level 1 diet and thin liquids      Premorbid diet::upon d/c from prior admission, pt appeared to be on dysphagia level 2/thin liquids upon prior admission for stroke initially; unknown prior to admission, pt rx being on softer foods as well as thickened liquids in the past        Positioning: upright in recliner        Items administered:  Consistencies Administered: nectar thick and puree  Materials administered included: pureed eggs, yogurt, NTL by cup    Total amount of meal consumed:   50% eggs  25% yogurt  ~240cc of NTL by cup         Oral stage:moderate  Lip closure: overall weak lip seal; greater weakness on L side  Anterior spillage: small amounts of egg on L side  Manipulation: moderately prolonged with eggs, noting increased movement around mouth but appeared with adequate breakdown of dry item  Bolus formation: prolonged with puree egg, weak and mildly disorganized  Bolus control: suspect decreased with NTL, especially when taking larger and consecutive sips  Transfer: functional with NTL, yogurt; increased effort noted with puree eggs  Oral residue: trace to minimal which pt cleared with liquids  Pocketing: none        Pharyngeal stage:moderate-severe  Swallow promptness: appeared mild to mild-mod delayed with NTL/yogurt; suspected mod delay with eggs  Hyolaryngeal elevation: present to palpation; suspect to be reduced  Wet voice: none  Throat clear: spontaneous x3 with NTL noted when taking consecutive sips  Cough: none  Secondary swallows: intermittently, more so noted with puree eggs  Audible swallows: inconsistent         Esophageal stage:  Pt reported indigestion towards end of meal then took another bite of eggs and appeared to gag, eventually expelling eggs  Pt then stated increasing nausea  SLP notified RN who administered zofran  Summary:     Pt presenting s/s suggestive of moderate oral and moderate-severe pharyngeal dysphagia today  Symptoms or concerns included weak lip seal, resulting in small amounts anterior loss on L, prolonged manipulation, slower and disorganized bolus formation, slower transfers of dry puree items, suspected decreased control of NT by cup when taking larger sips, suspected pharyngeal swallow delay, suspected decreased hyolaryngeal elevation upon palpation, suspected pharyngeal residue, multiple swallows, effortful swallow, audible swallows and spontaneous throat clearing during meal      Pt self fed meal noting improvement in overall pacing/rate of intake  However, pt did continue to require min to moderate verbal cues for taking single and smaller sips of liquid as when taking larger and consecutive sips, this resulted in increased throat clearing  Pt also benefited from re-education for alternating solids/liquids, as well as use of volitional cough periodically throughout meal  Pt able to elicit volitional cough, but noted to be weaker  Pt remains with slower and disorganized oral processing/prep, along with weaker lip seal/oral containment but managed current diet items  Transfers of NTL and moist puree remain prompter vs dry puree items  Full oral clearance achieved with increased time and use of liquids  Swallow initiation suspected to remain delayed across all items but appeared prompter with NTL, however, ? Control of NTL items, especially when taking multiple and larger sips as this resulted in increased throat clearing during meal  Towards end of meal, pt reported indigestion and soon after, took a bite of pureed eggs which she then gagged and spit out bite  Pt continued to then drink, however, also reported increasing nausea  SLP notified RN and pt was administered medication  Recommendations:  Recommended Diet:  puree/level 1 diet and nectar thick liquids by CUP ONLY-NO STRAWS  Recommended Form of Medications: whole with puree   Aspiration precautions and compensatory swallowing strategies: upright posture, only feed when fully alert, slow rate of feeding, small bites/sips, no straws, effortful swallow, cough every 4-5 bites/sips, quiet environment (tv off, limit talking, door closed, etc ), alternating bites and sips, OOB for ALL meals, MUST be AWAKE/ALERT for meals  Oral care with suction w/ after ALL meals  Once oral care was completed, pt is allowed ICE CHIPS between meals  However, pt MUST be FULLY upright when consuming ice chips  FULL supervision w/ meals  Results reviewed with:  patient, RN and pt's 1:1 sitter   Aspiration precautions posted   Reflux precautions-->Remain upright at least 30 min post meals     F/u ST tx: Pt is recommended for further skilled SLP services targeting dysphagia therapy in order to maximize oral and pharyngeal swallow skills, while safely supporting PO intake, as well as to improve independent carryover of safe swallow strategies  Plan: Focus on use of swallow strategies for ALL meals as pt cont to require increased cuing, which lack of use of strategies results in increased overt s/s aspiration             Monitor pt's current tolerance for diet             Initiate swallow exercises as able            Thin liquid trials under SLP supervision only   SLP Therapy Minutes   SLP Time In 0830   SLP Time Out 0930   SLP Total Time (minutes) 60   SLP Mode of treatment - Individual (minutes) 60   SLP Mode of treatment - Concurrent (minutes) 0   SLP Mode of treatment - Group (minutes) 0   SLP Mode of treatment - Co-treat (minutes) 0   SLP Mode of Treatment - Total time(minutes) 60 minutes   SLP Cumulative Minutes 630   Therapy Time missed   Time missed?  No

## 2023-03-11 NOTE — PROGRESS NOTES
"   03/11/23 1230   Pain Assessment   Pain Assessment Tool 0-10   Pain Score 5   Pain Location/Orientation Orientation: Right;Location: Leg   Restrictions/Precautions   Precautions 1:1;Aspiration;Bed/chair alarms;Cognitive; Fall Risk;Contact/isolation;Pain;Supervision on toilet/commode;Visual deficit; Impulsive   Weight Bearing Restrictions No   ROM Restrictions No   Lifestyle   Autonomy \"I didn't sleep well last night  \"   Lying to Sitting on Side of Bed   Type of Assistance Needed Supervision;Verbal cues   Physical Assistance Level No physical assistance   Comment inc time   Lying to Sitting on Side of Bed CARE Score 4   Sit to Stand   Type of Assistance Needed Physical assistance;Verbal cues   Physical Assistance Level 26%-50%   Sit to Stand CARE Score 3   Bed-Chair Transfer   Type of Assistance Needed Physical assistance;Verbal cues   Physical Assistance Level 26%-50%   Comment Mod vc's for proper hand placement and body mechanics  Chair/Bed-to-Chair Transfer CARE Score 3   Toileting Hygiene   Type of Assistance Needed Physical assistance;Verbal cues; Set-up / clean-up   Physical Assistance Level 51%-75%   Comment unable to toilet despite pt reporting to urinate and sitting on toilet for several minutes  Mod A in stance w/ pt able to manage clothing over R hip, pt unable to manage over L hip despite multimodal cues req A to fully manage  Toileting Hygiene CARE Score 2   Toilet Transfer   Type of Assistance Needed Physical assistance;Verbal cues; Jarad Valdes / Yamil Nj; Adaptive equipment   Physical Assistance Level 51%-75%   Comment stand pivot to [facility] over toilet, req Mod multimodal cues  While squaring off to [facility] turing to pt's R, noted w/ L lateral LOB req OTR to correct to refrain from fall  Pt used humor stating, \"I am really clumsy\" and unable to recognize seriousness of LOB     Toilet Transfer CARE Score 2   ROM - Left Upper Extremities    L Position Seated   L Weight/Reps/Sets 3x10   L Elbow AAROM;Elbow " flexion;Elbow extension   LUE ROM Comment cues to clasped hands, pt completed 3x10 AAROM to maximize L elbow flexion/extension  Req cues for rep count  Tolerated well w/ rest breaks between sets  Neuromuscular Education   Weight Bearing Technique Yes   LUE Weight Bearing Extended arm standing   Response to Weight Bearing Technique Static standing at table w/ L unilateral support of extended arm to maximize weightbearing  OTR provided Min A on pt's L and point of control provided at pt's elbow to allow natural elbow bend  Instructed controled L lateral lean and return to midline to maximize LUE weightbearing and proprioceptive feedback, RUE rested at pt's side  Completed 2x7  Extended seated rest break between sets, cues req  to inc attention to L hemibody  Functional Movement Patterns While seated in w/c, focused on LUE fxnl reach for cones to faciliate cylidrical grasp/release  Initially noted w/ poor divided attention, becoming easily distracted by others in therapy gym and req frequent redirection  However w/ inc task progression, improvement w/ undivided attention to task  Cones set-up on pt's L and req pt to cross midline to stack cones on pt's R  Noted to drop cones x4, x15 reps total w/ Mod vc's to maximize technique to faciliate and sustain grasp pattern  Rest breaks provided PRN  Cognition   Overall Cognitive Status Impaired   Comments Pt sleeping at start of session, req several minutes to inc arousal, even while seated EOB  Vitals WNL, w/ environmental adaptations and w/ fxnl mobility inc altertness noted  Assessment   Treatment Assessment Pt seen for skilled OT session focusing on toileting, short distance fxnl mobility without AD, and LUE NMR  At start of session, pt sleeping and req extended time to arouse and inc alertness  Monitored vitals, /58 seated, , SpO2 95% on RA  Per sleep log, pt, and nursing pt w/ poor sleep overnight   Pt cont to req vc's to inc attention to task and to L hemibody during fxnl act to inc safety and dec fall risk  During toilet xfer, noted w/ L lateral LOB and req Mod A of OTR to correct to refrain from fall  RN made aware of pt's inability to urinate despite sitting for several minutes  Pt requesting to change into hospital gown at end of session and 1:1 present and agreeable to A w/ same, encouraged pt to utilize call bell despite 1:1 being present  Cont OT POC: repetitive safety strategies, ADL retraining, LUE NMR, L visual scanning, endurance work, and toileting  Prognosis Fair   Problem List Decreased strength;Decreased range of motion;Decreased endurance; Impaired balance;Decreased mobility; Decreased cognition;Decreased coordination; Impaired judgement;Decreased safety awareness; Impaired vision   Plan   Treatment/Interventions ADL retraining;Functional transfer training; Therapeutic exercise; Endurance training;Cognitive reorientation;Patient/family training   Progress Slow progress, decreased activity tolerance   Recommendation   OT Discharge Recommendation Post acute rehabilitation services   OT Therapy Minutes   OT Time In 1230   OT Time Out 1345   OT Total Time (minutes) 75   OT Mode of treatment - Individual (minutes) 75   OT Mode of treatment - Concurrent (minutes) 0   OT Mode of treatment - Group (minutes) 0   OT Mode of treatment - Co-treat (minutes) 0   OT Mode of Treatment - Total time(minutes) 75 minutes   OT Cumulative Minutes 915   Therapy Time missed   Time missed?  No

## 2023-03-11 NOTE — PLAN OF CARE
Problem: PAIN - ADULT  Goal: Verbalizes/displays adequate comfort level or baseline comfort level  Description: Interventions:  - Encourage patient to monitor pain and request assistance  - Assess pain using appropriate pain scale  - Administer analgesics based on type and severity of pain and evaluate response  - Implement non-pharmacological measures as appropriate and evaluate response  - Consider cultural and social influences on pain and pain management  - Notify physician/advanced practitioner if interventions unsuccessful or patient reports new pain  Outcome: Progressing     Problem: INFECTION - ADULT  Goal: Absence or prevention of progression during hospitalization  Description: INTERVENTIONS:  - Assess and monitor for signs and symptoms of infection  - Monitor lab/diagnostic results  - Monitor all insertion sites, i e  indwelling lines, tubes, and drains  - Monitor endotracheal if appropriate and nasal secretions for changes in amount and color  - [place] appropriate cooling/warming therapies per order  - Administer medications as ordered  - Instruct and encourage patient and family to use good hand hygiene technique  - Identify and instruct in appropriate isolation precautions for identified infection/condition  Outcome: Progressing  Goal: Absence of fever/infection during neutropenic period  Description: INTERVENTIONS:  - Monitor WBC    Outcome: Progressing     Problem: SAFETY ADULT  Goal: Patient will remain free of falls  Description: INTERVENTIONS:  - Educate patient/family on patient safety including physical limitations  - Instruct patient to call for assistance with activity   - Consult OT/PT to assist with strengthening/mobility   - Keep Call bell within reach  - Keep bed low and locked with side rails adjusted as appropriate  - Keep care items and personal belongings within reach  - Initiate and maintain comfort rounds  - Make Fall Risk Sign visible to staff  - Offer Toileting every Hours, in advance of need  - Initiate/Maintain alarm  - Obtain necessary fall risk management equipment:   - Apply yellow socks and bracelet for high fall risk patients  - Consider moving patient to room near nurses station  Outcome: Progressing  Goal: Maintain or return to baseline ADL function  Description: INTERVENTIONS:  -  Assess patient's ability to carry out ADLs; assess patient's baseline for ADL function and identify physical deficits which impact ability to perform ADLs (bathing, care of mouth/teeth, toileting, grooming, dressing, etc )  - Assess/evaluate cause of self-care deficits   - Assess range of motion  - Assess patient's mobility; develop plan if impaired  - Assess patient's need for assistive devices and provide as appropriate  - Encourage maximum independence but intervene and supervise when necessary  - Involve family in performance of ADLs  - Assess for home care needs following discharge   - Consider OT consult to assist with ADL evaluation and planning for discharge  - Provide patient education as appropriate  Outcome: Progressing  Goal: Maintains/Returns to pre admission functional level  Description: INTERVENTIONS:  - Perform BMAT or MOVE assessment daily    - Set and communicate daily mobility goal to care team and patient/family/caregiver  - Collaborate with rehabilitation services on mobility goals if consulted  - Perform Range of Motion  times a day  - Reposition patient every  hours    - Dangle patient  times a day  - Stand patient times a day  - Ambulate patient  times a day  - Out of bed to chair times a day   - Out of bed for meals times a day  - Out of bed for toileting  - Record patient progress and toleration of activity level   Outcome: Progressing     Problem: DISCHARGE PLANNING  Goal: Discharge to home or other facility with appropriate resources  Description: INTERVENTIONS:  - Identify barriers to discharge w/patient and caregiver  - Arrange for needed discharge resources and transportation as appropriate  - Identify discharge learning needs (meds, wound care, etc )  - Arrange for interpretive services to assist at discharge as needed  - Refer to Case Management Department for coordinating discharge planning if the patient needs post-hospital services based on physician/advanced practitioner order or complex needs related to functional status, cognitive ability, or social support system  Outcome: Progressing     Problem: Nutrition/Hydration-ADULT  Goal: Nutrient/Hydration intake appropriate for improving, restoring or maintaining nutritional needs  Description: Monitor and assess patient's nutrition/hydration status for malnutrition  Collaborate with interdisciplinary team and initiate plan and interventions as ordered  Monitor patient's weight and dietary intake as ordered or per policy  Utilize nutrition screening tool and intervene as necessary  Determine patient's food preferences and provide high-protein, high-caloric foods as appropriate       INTERVENTIONS:  - Monitor oral intake, urinary output, labs, and treatment plans  - Assess nutrition and hydration status and recommend course of action  - Evaluate amount of meals eaten  - Assist patient with eating if necessary   - Allow adequate time for meals  - Recommend/ encourage appropriate diets, oral nutritional supplements, and vitamin/mineral supplements  - Order, calculate, and assess calorie counts as needed  - Recommend, monitor, and adjust tube feedings and TPN/PPN based on assessed needs  - Assess need for intravenous fluids  - Provide specific nutrition/hydration education as appropriate  - Include patient/family/caregiver in decisions related to nutrition  Outcome: Progressing     Problem: Prexisting or High Potential for Compromised Skin Integrity  Goal: Skin integrity is maintained or improved  Description: INTERVENTIONS:  - Identify patients at risk for skin breakdown  - Assess and monitor skin integrity  - Assess and monitor nutrition and hydration status  - Monitor labs   - Assess for incontinence   - Turn and reposition patient  - Assist with mobility/ambulation  - Relieve pressure over bony prominences  - Avoid friction and shearing  - Provide appropriate hygiene as needed including keeping skin clean and dry  - Evaluate need for skin moisturizer/barrier cream  - Collaborate with interdisciplinary team   - Patient/family teaching  - Consider wound care consult   Outcome: Progressing     Problem: MOBILITY - ADULT  Goal: Maintain or return to baseline ADL function  Description: INTERVENTIONS:  -  Assess patient's ability to carry out ADLs; assess patient's baseline for ADL function and identify physical deficits which impact ability to perform ADLs (bathing, care of mouth/teeth, toileting, grooming, dressing, etc )  - Assess/evaluate cause of self-care deficits   - Assess range of motion  - Assess patient's mobility; develop plan if impaired  - Assess patient's need for assistive devices and provide as appropriate  - Encourage maximum independence but intervene and supervise when necessary  - Involve family in performance of ADLs  - Assess for home care needs following discharge   - Consider OT consult to assist with ADL evaluation and planning for discharge  - Provide patient education as appropriate  Outcome: Progressing  Goal: Maintains/Returns to pre admission functional level  Description: INTERVENTIONS:  - Perform BMAT or MOVE assessment daily    - Set and communicate daily mobility goal to care team and patient/family/caregiver  - Collaborate with rehabilitation services on mobility goals if consulted  - Perform Range of Motion  times a day  - Reposition patient every hours    - Dangle patient  times a day  - Stand patient times a day  - Ambulate patient  times a day  - Out of bed to chair times a day   - Out of bed for meals  times a day  - Out of bed for toileting  - Record patient progress and toleration of activity level   Outcome: Progressing     Problem: PAIN - ADULT  Goal: Verbalizes/displays adequate comfort level or baseline comfort level  Description: Interventions:  - Encourage patient to monitor pain and request assistance  - Assess pain using appropriate pain scale  - Administer analgesics based on type and severity of pain and evaluate response  - Implement non-pharmacological measures as appropriate and evaluate response  - Consider cultural and social influences on pain and pain management  - Notify physician/advanced practitioner if interventions unsuccessful or patient reports new pain  Outcome: Progressing     Problem: INFECTION - ADULT  Goal: Absence or prevention of progression during hospitalization  Description: INTERVENTIONS:  - Assess and monitor for signs and symptoms of infection  - Monitor lab/diagnostic results  - Monitor all insertion sites, i e  indwelling lines, tubes, and drains  - Monitor endotracheal if appropriate and nasal secretions for changes in amount and color  - [place] appropriate cooling/warming therapies per order  - Administer medications as ordered  - Instruct and encourage patient and family to use good hand hygiene technique  - Identify and instruct in appropriate isolation precautions for identified infection/condition  Outcome: Progressing  Goal: Absence of fever/infection during neutropenic period  Description: INTERVENTIONS:  - Monitor WBC    Outcome: Progressing     Problem: SAFETY ADULT  Goal: Patient will remain free of falls  Description: INTERVENTIONS:  - Educate patient/family on patient safety including physical limitations  - Instruct patient to call for assistance with activity   - Consult OT/PT to assist with strengthening/mobility   - Keep Call bell within reach  - Keep bed low and locked with side rails adjusted as appropriate  - Keep care items and personal belongings within reach  - Initiate and maintain comfort rounds  - Make Fall Risk Sign visible to staff  - Offer Toileting every Hours, in advance of need  - Initiate/Maintain alarm  - Obtain necessary fall risk management equipment:   - Apply yellow socks and bracelet for high fall risk patients  - Consider moving patient to room near nurses station  Outcome: Progressing  Goal: Maintain or return to baseline ADL function  Description: INTERVENTIONS:  -  Assess patient's ability to carry out ADLs; assess patient's baseline for ADL function and identify physical deficits which impact ability to perform ADLs (bathing, care of mouth/teeth, toileting, grooming, dressing, etc )  - Assess/evaluate cause of self-care deficits   - Assess range of motion  - Assess patient's mobility; develop plan if impaired  - Assess patient's need for assistive devices and provide as appropriate  - Encourage maximum independence but intervene and supervise when necessary  - Involve family in performance of ADLs  - Assess for home care needs following discharge   - Consider OT consult to assist with ADL evaluation and planning for discharge  - Provide patient education as appropriate  Outcome: Progressing  Goal: Maintains/Returns to pre admission functional level  Description: INTERVENTIONS:  - Perform BMAT or MOVE assessment daily    - Set and communicate daily mobility goal to care team and patient/family/caregiver  - Collaborate with rehabilitation services on mobility goals if consulted  - Perform Range of Motion  times a day  - Reposition patient every hours    - Dangle patient times a day  - Stand patient times a day  - Ambulate patient times a day  - Out of bed to chair  times a day   - Out of bed for meals  times a day  - Out of bed for toileting  - Record patient progress and toleration of activity level   Outcome: Progressing     Problem: DISCHARGE PLANNING  Goal: Discharge to home or other facility with appropriate resources  Description: INTERVENTIONS:  - Identify barriers to discharge w/patient and caregiver  - Arrange for needed discharge resources and transportation as appropriate  - Identify discharge learning needs (meds, wound care, etc )  - Arrange for interpretive services to assist at discharge as needed  - Refer to Case Management Department for coordinating discharge planning if the patient needs post-hospital services based on physician/advanced practitioner order or complex needs related to functional status, cognitive ability, or social support system  Outcome: Progressing     Problem: Nutrition/Hydration-ADULT  Goal: Nutrient/Hydration intake appropriate for improving, restoring or maintaining nutritional needs  Description: Monitor and assess patient's nutrition/hydration status for malnutrition  Collaborate with interdisciplinary team and initiate plan and interventions as ordered  Monitor patient's weight and dietary intake as ordered or per policy  Utilize nutrition screening tool and intervene as necessary  Determine patient's food preferences and provide high-protein, high-caloric foods as appropriate       INTERVENTIONS:  - Monitor oral intake, urinary output, labs, and treatment plans  - Assess nutrition and hydration status and recommend course of action  - Evaluate amount of meals eaten  - Assist patient with eating if necessary   - Allow adequate time for meals  - Recommend/ encourage appropriate diets, oral nutritional supplements, and vitamin/mineral supplements  - Order, calculate, and assess calorie counts as needed  - Recommend, monitor, and adjust tube feedings and TPN/PPN based on assessed needs  - Assess need for intravenous fluids  - Provide specific nutrition/hydration education as appropriate  - Include patient/family/caregiver in decisions related to nutrition  Outcome: Progressing     Problem: Prexisting or High Potential for Compromised Skin Integrity  Goal: Skin integrity is maintained or improved  Description: INTERVENTIONS:  - Identify patients at risk for skin breakdown  - Assess and monitor skin integrity  - Assess and monitor nutrition and hydration status  - Monitor labs   - Assess for incontinence   - Turn and reposition patient  - Assist with mobility/ambulation  - Relieve pressure over bony prominences  - Avoid friction and shearing  - Provide appropriate hygiene as needed including keeping skin clean and dry  - Evaluate need for skin moisturizer/barrier cream  - Collaborate with interdisciplinary team   - Patient/family teaching  - Consider wound care consult   Outcome: Progressing     Problem: MOBILITY - ADULT  Goal: Maintain or return to baseline ADL function  Description: INTERVENTIONS:  -  Assess patient's ability to carry out ADLs; assess patient's baseline for ADL function and identify physical deficits which impact ability to perform ADLs (bathing, care of mouth/teeth, toileting, grooming, dressing, etc )  - Assess/evaluate cause of self-care deficits   - Assess range of motion  - Assess patient's mobility; develop plan if impaired  - Assess patient's need for assistive devices and provide as appropriate  - Encourage maximum independence but intervene and supervise when necessary  - Involve family in performance of ADLs  - Assess for home care needs following discharge   - Consider OT consult to assist with ADL evaluation and planning for discharge  - Provide patient education as appropriate  Outcome: Progressing  Goal: Maintains/Returns to pre admission functional level  Description: INTERVENTIONS:  - Perform BMAT or MOVE assessment daily    - Set and communicate daily mobility goal to care team and patient/family/caregiver  - Collaborate with rehabilitation services on mobility goals if consulted  - Perform Range of Motion  times a day  - Reposition patient every hours    - Dangle patient  times a day  - Stand patient times a day  - Ambulate patient  times a day  - Out of bed to chair  times a day   - Out of bed for meals  times a day  - Out of bed for toileting  - Record patient progress and toleration of activity level   Outcome: Progressing

## 2023-03-12 RX ADMIN — MELATONIN 3 MG: at 21:23

## 2023-03-12 RX ADMIN — PANTOPRAZOLE SODIUM 40 MG: 40 TABLET, DELAYED RELEASE ORAL at 05:22

## 2023-03-12 RX ADMIN — ATORVASTATIN CALCIUM 40 MG: 40 TABLET, FILM COATED ORAL at 18:18

## 2023-03-12 RX ADMIN — APIXABAN 5 MG: 5 TABLET, FILM COATED ORAL at 07:45

## 2023-03-12 RX ADMIN — METOPROLOL SUCCINATE 25 MG: 25 TABLET, EXTENDED RELEASE ORAL at 07:46

## 2023-03-12 RX ADMIN — APIXABAN 5 MG: 5 TABLET, FILM COATED ORAL at 18:18

## 2023-03-12 RX ADMIN — HYDROXYCHLOROQUINE SULFATE 400 MG: 200 TABLET ORAL at 07:47

## 2023-03-12 RX ADMIN — METOPROLOL SUCCINATE 25 MG: 25 TABLET, EXTENDED RELEASE ORAL at 18:18

## 2023-03-12 RX ADMIN — MORPHINE SULFATE 7.5 MG: 15 TABLET ORAL at 18:19

## 2023-03-12 RX ADMIN — ACETAMINOPHEN 650 MG: 325 TABLET ORAL at 05:22

## 2023-03-12 RX ADMIN — ONDANSETRON 4 MG: 4 TABLET, ORALLY DISINTEGRATING ORAL at 10:05

## 2023-03-12 RX ADMIN — SENNOSIDES AND DOCUSATE SODIUM 1 TABLET: 8.6; 5 TABLET ORAL at 18:18

## 2023-03-12 RX ADMIN — PREDNISONE 20 MG: 20 TABLET ORAL at 07:45

## 2023-03-12 RX ADMIN — GABAPENTIN 100 MG: 100 CAPSULE ORAL at 07:46

## 2023-03-12 RX ADMIN — BUPROPION HYDROCHLORIDE TABLETS 100 MG: 100 TABLET, FILM COATED ORAL at 18:19

## 2023-03-12 RX ADMIN — DULOXETINE HYDROCHLORIDE 60 MG: 60 CAPSULE, DELAYED RELEASE ORAL at 07:45

## 2023-03-12 RX ADMIN — SENNOSIDES AND DOCUSATE SODIUM 1 TABLET: 8.6; 5 TABLET ORAL at 07:45

## 2023-03-12 RX ADMIN — CINACALCET 30 MG: 30 TABLET, FILM COATED ORAL at 07:47

## 2023-03-12 RX ADMIN — LIDOCAINE 5% 1 PATCH: 700 PATCH TOPICAL at 10:07

## 2023-03-12 RX ADMIN — DULOXETINE HYDROCHLORIDE 60 MG: 60 CAPSULE, DELAYED RELEASE ORAL at 21:23

## 2023-03-12 RX ADMIN — ONDANSETRON 4 MG: 4 TABLET, ORALLY DISINTEGRATING ORAL at 16:50

## 2023-03-12 RX ADMIN — PRAZOSIN HYDROCHLORIDE 1 MG: 1 CAPSULE ORAL at 21:24

## 2023-03-12 RX ADMIN — BUPROPION HYDROCHLORIDE TABLETS 100 MG: 100 TABLET, FILM COATED ORAL at 07:47

## 2023-03-12 RX ADMIN — GABAPENTIN 100 MG: 100 CAPSULE ORAL at 18:18

## 2023-03-12 RX ADMIN — LAMOTRIGINE 150 MG: 100 TABLET ORAL at 21:23

## 2023-03-12 RX ADMIN — POTASSIUM CHLORIDE 40 MEQ: 1500 TABLET, EXTENDED RELEASE ORAL at 07:45

## 2023-03-12 NOTE — PROGRESS NOTES
Internal Medicine Progress Note  Patient: Michell Raines  Age/sex: 75 y o  female  Medical Record #: [id number]      ASSESSMENT/PLAN: (Interval History)  Michell Raines is seen and examined and management for following issues:    Recent right MCA infarct W/petechial hemorrhage  • S/P thrombectomy by Dr Negin Catalan 2/12  • Continue Eliquis 5 mg twice daily/statin   • Follow-up with neurosurgery/neurology as scheduled     NICM  • EF has been as low as 10 to 15% in the past  • In 2022 was 65%  • Now down to 30% = pt declined LifeVest  • Cardiology felt this was secondary to her recent CVA as well as possible medication noncompliance  • They recommend continuing beta-blocker and adding ARB when BP allows  • Will need outpatient ischemia work-up  • Followed by Dr Tiki Jones at [facility] however would like to follow with [facility] on dc  • Can add arb if bp improves     Seronegative rheumatoid arthritis  • A/T rheumatology takes Plaquenil 200 mg Monday through Friday 400 mg on Saturday and Sunday  • Continue same  • Follow-up on discharge     Hypercalcemia  · Seen by renal/endocrine on acute side  · Endocrine recommends starting sensipar if corrected calcium >12  · Corrected calcium 12 on 3/6/23 labs  · Endocrine input appreciated  Will need outpt follow-up and potentially a surgical evaluation  · May possibly need imaging with contrast however will need renal input regarding pretreatment for same  · So far stable    Fall  · 3/6/23 onto left side  · Abrasion on left knee and Xray negative for fracture  · Could not verify whether or not she struck her head  · CT of head stable  · She also c/o L hip pain imaging pending but able to bear weight  · Fall precautions all in place  On continual observation      R great toe gout  · S/p prednisone  · Uric acid normal  · improved     Chronic pain  • Due to compression fractures of her thoracic spine/chronic rib fractures  • Lethargic 3/7 and MS contin changed to IR  • Pain management per PMR     Bipolar disorder  • Continue current medication regimen  • Follow-up with psychiatry on discharge  • Neuropsych to follow    Anxiety  · Cont current meds  · Moods are stable    Confusion  · On and off  · Remains on 1:1 for safety s/p fall  · Decreased prednisone d/t same    Tachycardia  · May be 2/2 anxiety/MS contin dc'd  · Monitor for now     SHAR  • Does not use CPAP at home d/t claustraphobia  • OK use oxygen overnight     Dysuria/Urinary frequency/incontinence  • Urine culture +CRE  • s/p vantin 200mg bid through 3/9/23  • Symptoms improved    Hypokalemia  · K 3 4  · Replete kcl 40 meq daily x 2  · BMP monday         DC planning: 3/22/23       The above assessment and plan was reviewed and updated as determined by my evaluation of the patient on 3/12/2023      Labs:   Results from last 7 days   Lab Units 03/07/23  1437 03/06/23  1224   WBC Thousand/uL 9 35 7 48   HEMOGLOBIN g/dL 14 7 13 1   HEMATOCRIT % 46 1 41 5   PLATELETS Thousands/uL 264 262     Results from last 7 days   Lab Units 03/11/23  0551 03/10/23  0441   SODIUM mmol/L 145 146   POTASSIUM mmol/L 3 4* 3 4*   CHLORIDE mmol/L 111* 113*   CO2 mmol/L 29 26   BUN mg/dL 11 12   CREATININE mg/dL 0 96 1 00   CALCIUM mg/dL 10 0 10 2*             Results from last 7 days   Lab Units 03/07/23  1358   POC GLUCOSE mg/dl 127       Review of Scheduled Meds:  Current Facility-Administered Medications   Medication Dose Route Frequency Provider Last Rate   • acetaminophen  650 mg Oral Q6H PRN Rajeshravindra Ramirezt, DO     • albuterol  2 puff Inhalation Q6H PRN Rajesh Markham, DO     • aluminum-magnesium hydroxide-simethicone  30 mL Oral Q4H PRN Rajesh Markham, DO     • apixaban  5 mg Oral BID Rajesh Markham, DO     • atorvastatin  40 mg Oral QPM Rajeshravindra Ramirezt, DO     • bisacodyl  10 mg Rectal Once Claudean Solan, MD     • buPROPion  100 mg Oral BID Rajesh Markham, DO     • cinacalcet  30 mg Oral Daily With Lucas Kelly MD     • DULoxetine  60 mg Oral BID Georgette Galvan, DO     • gabapentin  100 mg Oral BID Chance Valverde MD     • glycerin-hypromellose-  1 drop Both Eyes Q4H PRN Georgette Galvan DO     • hydroxychloroquine  200 mg Oral Once per day on Mon Tue Wed Thu Fri Georgette Galvan DO     • hydroxychloroquine  400 mg Oral Once per day on Sun Sat Georgette Galvan DO     • lamoTRIgine  150 mg Oral HS Georgette Galvan DO     • lidocaine  1 patch Topical Daily Ángel Butler MD     • LORazepam  0 25 mg Oral BID PRN Chance Valverde MD     • melatonin  3 mg Oral HS LAURA Rooney     • menthol-methyl salicylate   Apply externally 4x Daily PRN Ángel Butler MD     • metoprolol succinate  25 mg Oral BID LAURA Caba     • morphine  7 5 mg Oral Q6H PRN Chance Valverde MD     • ondansetron  4 mg Oral Q6H PRN Georgette Galvan DO     • pantoprazole  40 mg Oral Early Morning Diamante Naik MD     • polyethylene glycol  17 g Oral Daily Geogrette Galvan DO     • polyethylene glycol  17 g Oral Daily PRN Georgette Galvan DO     • potassium chloride  40 mEq Oral Daily LAURA Caba     • prazosin  1 mg Oral HS Georgette Galvan DO     • senna-docusate sodium  1 tablet Oral BID Georgette Galvan DO     • sodium chloride  1 spray Each Nare Q1H PRN Anjana Lea MD         Subjective/ HPI: Patient seen and examined  Patients overnight issues or events were reviewed with nursing or staff during rounds or morning huddle session  New or overnight issues include the following:     Pt seen in her room  Sleepy but arousable this am      ROS:   A 10 point ROS was performed; negative except as noted above  Imaging:     FL barium swallow video w speech   Final Result by SYSTEMGENERATED, DOCUMENTATION (03/09 1306)      FL barium swallow video w speech   Final Result by  (03/09 6190)      XR hip/pelv 2-3 vws left if performed   Final Result by Marc Whitfield MD (03/08 7073)      No acute osseous abnormality  Workstation performed: [id number]         XR shoulder 2+ vw left   Final Result by Slade Gordon MD (03/08 1635)      No acute osseous abnormality  Workstation performed: [id number]         XR spine cervical 2 or 3 vw injury   Final Result by Slade Gordon MD (03/08 1643)      No acute osseous abnormality or significant change from priors  [place]-and[place]pepper appearance of the skull is partially accentuated by the image processing /sharpening algorithm, though prior head CT suggests this is a true finding  Correlate for clinical/laboratory features of hyperparathyroidism  Workstation performed: [id number]         XR chest portable   Final Result by Rosamaria Ramirez MD (03/07 1650)      No acute cardiopulmonary disease  Workstation performed: [id number]         CT head wo contrast   Final Result by Andres Carroll MD (03/07 1358)      Evolving right MCA territory infarcts with associated petechial hemorrhage which is best seen on the recent MRI examination  There is no discrete parenchymal hematoma  Workstation performed: [id number]         XR knee 1 or 2 vw left   Final Result by Rosamaria Ramirez MD (03/07 1537)      No acute osseous abnormality              Workstation performed: [id number]         FL barium swallow ROUTINE esophagus    (Results Pending)       *Labs /Radiology studies reviewed  *Medications reviewed and reconciled as needed  *Please refer to order section for additional ordered labs studies  *Case discussed with primary attending during morning huddle case rounds    Physical Examination:  Vitals:   Vitals:    03/11/23 0850 03/11/23 1412 03/11/23 2110 03/12/23 0522   BP:  103/67 112/80 110/73   BP Location:  Left arm Right arm Left arm   Pulse: (!) 118 (!) 119 95 (!) 110   Resp:  18 19 20   Temp:  97 5 °F (36 4 °C) 98 3 °F (36 8 °C) 97 9 °F (36 6 °C)   TempSrc:  Axillary Oral Oral   SpO2: 97% 96% 96% 98%   Weight:       Height: GEN: No apparent distress, sleepy  NEURO: Alert and oriented x3; left neglect, cognitive issues as well as recall issues, mild dysarthria; no confusion this am  HEENT: Pupils are equal and reactive, EOMI, mucous membranes are moist, face asymmetrical  CV: S1 S2 regular, no MRG, no peripheral edema noted  RESP: Lungs are clear bilaterally, no wheezes, rales or rhonchi noted, on room air, respirations easy and non labored  GI: Flat, soft non tender, non distended; +BS x4  : Voiding without difficulty, incontinent at times  MUSC: Moves all extremities; left hemiparesis; right great toe with improved erythema and less painful  SKIN: pink, warm and dry, poor turgor, b/l LE with dressings in place for both prevention/current wounds         The above physical exam was reviewed and updated as determined by my evaluation of the patient on 3/12/2023  Invasive Devices     Peripheral Intravenous Line  Duration           Peripheral IV 03/10/23 Left;Ventral (anterior) Forearm 1 day          Drain  Duration           External Urinary Catheter 11 days                   VTE Pharmacologic Prophylaxis: Eliquis  Code Status: Level 1 - Full Code  Current Length of Stay: 12 day(s)      Total time spent:  30 minutes  with more than 50% spent counseling/coordinating care  Counseling includes discussion with patient re: progress  and discussion with patient of his/her current medical state/information  Coordination of patient's care was performed in conjunction with primary service  Time invested included review of patient's labs, vitals, and management of their comorbidities with continued monitoring  In addition, this patient was discussed with medical team including physician and advanced extenders  The care of the patient was extensively discussed and appropriate treatment plan was formulated unique for this patient   Medical decision making for the day was made by supervising physician unless otherwise noted in their attestation statement  ** Please Note:  voice to text software may have been used in the creation of this document   Although proof errors in transcription or interpretation are a potential of such software**

## 2023-03-12 NOTE — PLAN OF CARE
Problem: PAIN - ADULT  Goal: Verbalizes/displays adequate comfort level or baseline comfort level  Description: Interventions:  - Encourage patient to monitor pain and request assistance  - Assess pain using appropriate pain scale  - Administer analgesics based on type and severity of pain and evaluate response  - Implement non-pharmacological measures as appropriate and evaluate response  - Consider cultural and social influences on pain and pain management  - Notify physician/advanced practitioner if interventions unsuccessful or patient reports new pain  3/12/2023 1053 by Ethel Daley RN  Outcome: Progressing  3/12/2023 1044 by Ethel Daley RN  Outcome: Progressing     Problem: INFECTION - ADULT  Goal: Absence or prevention of progression during hospitalization  Description: INTERVENTIONS:  - Assess and monitor for signs and symptoms of infection  - Monitor lab/diagnostic results  - Monitor all insertion sites, i e  indwelling lines, tubes, and drains  - Monitor endotracheal if appropriate and nasal secretions for changes in amount and color  - [place] appropriate cooling/warming therapies per order  - Administer medications as ordered  - Instruct and encourage patient and family to use good hand hygiene technique  - Identify and instruct in appropriate isolation precautions for identified infection/condition  3/12/2023 1053 by Ethel Daley RN  Outcome: Progressing  3/12/2023 1044 by Ethel Daley RN  Outcome: Progressing  Goal: Absence of fever/infection during neutropenic period  Description: INTERVENTIONS:  - Monitor WBC    3/12/2023 1053 by Ethel Daley RN  Outcome: Progressing  3/12/2023 1044 by Ethel Daley RN  Outcome: Progressing     Problem: SAFETY ADULT  Goal: Patient will remain free of falls  Description: INTERVENTIONS:  - Educate patient/family on patient safety including physical limitations  - Instruct patient to call for assistance with activity   - Consult OT/PT to assist with strengthening/mobility   - Keep Call bell within reach  - Keep bed low and locked with side rails adjusted as appropriate  - Keep care items and personal belongings within reach  - Initiate and maintain comfort rounds  - Make Fall Risk Sign visible to staff  - Offer Toileting every  Hours, in advance of need  - Initiate/Maintain alarm  - Obtain necessary fall risk management equipment:   - Apply yellow socks and bracelet for high fall risk patients  - Consider moving patient to room near nurses station  3/12/2023 1053 by Jerica Donis RN  Outcome: Progressing  3/12/2023 1044 by Jerica Donis RN  Outcome: Progressing  Goal: Maintain or return to baseline ADL function  Description: INTERVENTIONS:  -  Assess patient's ability to carry out ADLs; assess patient's baseline for ADL function and identify physical deficits which impact ability to perform ADLs (bathing, care of mouth/teeth, toileting, grooming, dressing, etc )  - Assess/evaluate cause of self-care deficits   - Assess range of motion  - Assess patient's mobility; develop plan if impaired  - Assess patient's need for assistive devices and provide as appropriate  - Encourage maximum independence but intervene and supervise when necessary  - Involve family in performance of ADLs  - Assess for home care needs following discharge   - Consider OT consult to assist with ADL evaluation and planning for discharge  - Provide patient education as appropriate  3/12/2023 1053 by Jerica Donis RN  Outcome: Progressing  3/12/2023 1044 by Jerica Donis RN  Outcome: Progressing  Goal: Maintains/Returns to pre admission functional level  Description: INTERVENTIONS:  - Perform BMAT or MOVE assessment daily    - Set and communicate daily mobility goal to care team and patient/family/caregiver  - Collaborate with rehabilitation services on mobility goals if consulted  - Perform Range of Motion  times a day  - Reposition patient every  hours    - Dangle patient  times a day  - Stand patient  times a day  - Ambulate patient  times a day  - Out of bed to chair  times a day   - Out of bed for meals  times a day  - Out of bed for toileting  - Record patient progress and toleration of activity level   3/12/2023 1053 by Celio Oneill RN  Outcome: Progressing  3/12/2023 1044 by Celio Oneill RN  Outcome: Progressing     Problem: DISCHARGE PLANNING  Goal: Discharge to home or other facility with appropriate resources  Description: INTERVENTIONS:  - Identify barriers to discharge w/patient and caregiver  - Arrange for needed discharge resources and transportation as appropriate  - Identify discharge learning needs (meds, wound care, etc )  - Arrange for interpretive services to assist at discharge as needed  - Refer to Case Management Department for coordinating discharge planning if the patient needs post-hospital services based on physician/advanced practitioner order or complex needs related to functional status, cognitive ability, or social support system  3/12/2023 1053 by Celio Oneill RN  Outcome: Progressing  3/12/2023 1044 by Celio Oneill RN  Outcome: Progressing     Problem: Nutrition/Hydration-ADULT  Goal: Nutrient/Hydration intake appropriate for improving, restoring or maintaining nutritional needs  Description: Monitor and assess patient's nutrition/hydration status for malnutrition  Collaborate with interdisciplinary team and initiate plan and interventions as ordered  Monitor patient's weight and dietary intake as ordered or per policy  Utilize nutrition screening tool and intervene as necessary  Determine patient's food preferences and provide high-protein, high-caloric foods as appropriate       INTERVENTIONS:  - Monitor oral intake, urinary output, labs, and treatment plans  - Assess nutrition and hydration status and recommend course of action  - Evaluate amount of meals eaten  - Assist patient with eating if necessary   - Allow adequate time for meals  - Recommend/ encourage appropriate diets, oral nutritional supplements, and vitamin/mineral supplements  - Order, calculate, and assess calorie counts as needed  - Recommend, monitor, and adjust tube feedings and TPN/PPN based on assessed needs  - Assess need for intravenous fluids  - Provide specific nutrition/hydration education as appropriate  - Include patient/family/caregiver in decisions related to nutrition  3/12/2023 1053 by Paulo Gandhi RN  Outcome: Progressing  3/12/2023 1044 by Paulo Gandhi RN  Outcome: Progressing     Problem: Prexisting or High Potential for Compromised Skin Integrity  Goal: Skin integrity is maintained or improved  Description: INTERVENTIONS:  - Identify patients at risk for skin breakdown  - Assess and monitor skin integrity  - Assess and monitor nutrition and hydration status  - Monitor labs   - Assess for incontinence   - Turn and reposition patient  - Assist with mobility/ambulation  - Relieve pressure over bony prominences  - Avoid friction and shearing  - Provide appropriate hygiene as needed including keeping skin clean and dry  - Evaluate need for skin moisturizer/barrier cream  - Collaborate with interdisciplinary team   - Patient/family teaching  - Consider wound care consult   3/12/2023 1053 by Paulo Gandhi RN  Outcome: Progressing  3/12/2023 1044 by Paulo Gandhi RN  Outcome: Progressing     Problem: MOBILITY - ADULT  Goal: Maintain or return to baseline ADL function  Description: INTERVENTIONS:  -  Assess patient's ability to carry out ADLs; assess patient's baseline for ADL function and identify physical deficits which impact ability to perform ADLs (bathing, care of mouth/teeth, toileting, grooming, dressing, etc )  - Assess/evaluate cause of self-care deficits   - Assess range of motion  - Assess patient's mobility; develop plan if impaired  - Assess patient's need for assistive devices and provide as appropriate  - Encourage maximum independence but intervene and supervise when necessary  - Involve family in performance of ADLs  - Assess for home care needs following discharge   - Consider OT consult to assist with ADL evaluation and planning for discharge  - Provide patient education as appropriate  3/12/2023 1053 by Ethel Daley RN  Outcome: Progressing  3/12/2023 1044 by Ethel Daley RN  Outcome: Progressing  Goal: Maintains/Returns to pre admission functional level  Description: INTERVENTIONS:  - Perform BMAT or MOVE assessment daily    - Set and communicate daily mobility goal to care team and patient/family/caregiver  - Collaborate with rehabilitation services on mobility goals if consulted  - Perform Range of Motion  times a day  - Reposition patient every hours    - Dangle patient  times a day  - Stand patient times a day  - Ambulate patient times a day  - Out of bed to chair times a day   - Out of bed for meals  times a day  - Out of bed for toileting  - Record patient progress and toleration of activity level   3/12/2023 1053 by Ethel Daley RN  Outcome: Progressing  3/12/2023 1044 by Ethel Daley RN  Outcome: Progressing

## 2023-03-12 NOTE — PROGRESS NOTES
Internal Medicine Progress Note  Patient: Yasmin Ventura  Age/sex: 75 y o  female  Medical Record #: [id number]      ASSESSMENT/PLAN: (Interval History)  Yasmin Ventura is seen and examined and management for following issues:    Recent right MCA infarct W/petechial hemorrhage  • S/P thrombectomy by Dr Mcdonnell Ranks 2/12  • Continue Eliquis 5 mg twice daily/statin   • Follow-up with neurosurgery/neurology as scheduled     NICM  • EF has been as low as 10 to 15% in the past  • In 2022 was 65%  • Now down to 30% = pt declined LifeVest  • Cardiology felt this was secondary to her recent CVA as well as possible medication noncompliance  • They recommend continuing beta-blocker and adding ARB when BP allows  • Will need outpatient ischemia work-up  • Followed by Dr Nahid Quinones at [facility] however would like to follow with [facility] on dc  • Have not been able to add back ARB yet 2/2 soft BPs and cant go down on Toprol dosing since HRs go into the low 100s at times  Coreg changed to Toprol during her previous hospitalization as Coreg was not controlling her HR      Seronegative rheumatoid arthritis  • A/T rheumatology takes Plaquenil 200 mg Monday through Friday 400 mg on Saturday and Sunday  • Continue same  • Follow-up on discharge     Hypercalcemia  · Seen by renal/endocrine on acute side  · Endocrine recommends starting sensipar if corrected calcium >12  · Corrected calcium 12 on 3/6/23 labs  · Endocrine input appreciated  Will need outpt follow-up and potentially a surgical evaluation  · May possibly need imaging with contrast however will need renal input regarding pretreatment for same    Fall  · 3/6/23 onto left side  · Abrasion on left knee and Xray negative for fracture  · Could not verify whether or not she struck her head  · CT of head stable  · She also c/o L hip pain imaging pending but able to bear weight  · Fall precautions all in place  On continual observation      R great toe gout  · Started prednisone 40mg 3/7 will cont 20mg daily x 3 days  · Uric acid normal  · improved     Chronic pain  • Due to compression fractures of her thoracic spine/chronic rib fractures  • Lethargic 3/7 and MS contin changed to IR  • Pain management per PMR     Bipolar disorder  • Continue current medication regimen  • Follow-up with psychiatry on discharge  • Neuropsych to follow    Anxiety  · PMR adding small dose of xanax bid    Confusion  · On and off  · Remains on 1:1 for safety s/p fall  · Decreased prednisone d/t same    Tachycardia  · May be 2/2 anxiety/MS contin dc'd  · Monitor for now     SHAR  • Does not use CPAP at home d/t claustraphobia  • OK use oxygen overnight     Dysuria/Urinary frequency/incontinence  • Urine culture +CRE  • PMR started IV Cipro 400mg every 12 hours for 5 days  • Switched to vantin 200mg bid through 3/9/23  • Symptoms improved    Hypokalemia  · K 3 4  · Pt wants to try pill replacement again despite not being able to tolerate previously  · Replete wit 40 kcl x 2 days  · BMP monday         DC planning: 3/22/23       The above assessment and plan was reviewed and updated as determined by my evaluation of the patient on 3/11/2023      Labs:   Results from last 7 days   Lab Units 03/07/23  1437 03/06/23  1224   WBC Thousand/uL 9 35 7 48   HEMOGLOBIN g/dL 14 7 13 1   HEMATOCRIT % 46 1 41 5   PLATELETS Thousands/uL 264 262     Results from last 7 days   Lab Units 03/11/23  0551 03/10/23  0441   SODIUM mmol/L 145 146   POTASSIUM mmol/L 3 4* 3 4*   CHLORIDE mmol/L 111* 113*   CO2 mmol/L 29 26   BUN mg/dL 11 12   CREATININE mg/dL 0 96 1 00   CALCIUM mg/dL 10 0 10 2*             Results from last 7 days   Lab Units 03/07/23  1358   POC GLUCOSE mg/dl 127       Review of Scheduled Meds:  Current Facility-Administered Medications   Medication Dose Route Frequency Provider Last Rate   • acetaminophen  650 mg Oral Q6H PRN Celestia Fleming, DO     • albuterol  2 puff Inhalation Q6H PRN Celestia Fleming, DO     • aluminum-magnesium hydroxide-simethicone  30 mL Oral Q4H PRN Marleen Christopher, DO     • apixaban  5 mg Oral BID Marleen Christopher, DO     • atorvastatin  40 mg Oral QPM Marleen Christopher, DO     • bisacodyl  10 mg Rectal Once Zina iJmenes MD     • buPROPion  100 mg Oral BID Marleen Christopher, DO     • cinacalcet  30 mg Oral Daily With Breakfast Anika Alicia MD     • DULoxetine  60 mg Oral BID Marleen Christopher, DO     • gabapentin  100 mg Oral BID Zina Jimenes MD     • glycerin-hypromellose-  1 drop Both Eyes Q4H PRN Marleen Christopher, DO     • hydroxychloroquine  200 mg Oral Once per day on Mon Tue Wed Thu Fri Marleen Christopher, DO     • hydroxychloroquine  400 mg Oral Once per day on Sun Sat Marleen Christopher, DO     • lamoTRIgine  150 mg Oral HS Marleen Christopher, DO     • lidocaine  1 patch Topical Daily Mallory Ward MD     • LORazepam  0 25 mg Oral BID PRN Zina Jimenes MD     • melatonin  3 mg Oral HS Iqra Pardo CRNP     • menthol-methyl salicylate   Apply externally 4x Daily PRN Mallory Ward MD     • metoprolol succinate  25 mg Oral BID Duran Woosung, CRNP     • morphine  7 5 mg Oral Q6H PRN Zina Jimenes MD     • ondansetron  4 mg Oral Q6H PRN Marleen Christopher, DO     • pantoprazole  40 mg Oral Early Morning Estlea Garcia MD     • polyethylene glycol  17 g Oral Daily Marleen Christopher, DO     • polyethylene glycol  17 g Oral Daily PRN Marleen Christopher DO     • potassium chloride  40 mEq Oral Daily Duran Woosung, CRNP     • prazosin  1 mg Oral HS Marleen Christopher, DO     • predniSONE  20 mg Oral Daily Duran Woosung, CRNP     • senna-docusate sodium  1 tablet Oral BID Marleen Christopher, DO     • sodium chloride  1 spray Each Nare Q1H PRN Khushi Sher MD         Subjective/ HPI: Patient seen and examined  Patients overnight issues or events were reviewed with nursing or staff during rounds or morning huddle session  New or overnight issues include the following:     Pt seen in her room   No issues other than not liking KCL supplement    ROS:   A 10 point ROS was performed; negative except as noted above  Imaging:     FL barium swallow video w speech   Final Result by JAMI HARKINS (03/09 1306)      FL barium swallow video w speech   Final Result by  (03/09 1550)      XR hip/pelv 2-3 vws left if performed   Final Result by Johana Lemos MD (03/08 1633)      No acute osseous abnormality  Workstation performed: [id number]         XR shoulder 2+ vw left   Final Result by Johana Lemos MD (03/08 1635)      No acute osseous abnormality  Workstation performed: [id number]         XR spine cervical 2 or 3 vw injury   Final Result by Johana Lemos MD (03/08 1643)      No acute osseous abnormality or significant change from priors  Salt-and-pepper appearance of the skull is partially accentuated by the image processing /sharpening algorithm, though prior head CT suggests this is a true finding  Correlate for clinical/laboratory features of hyperparathyroidism  Workstation performed: [id number]         XR chest portable   Final Result by Jef Gomez MD (03/07 1650)      No acute cardiopulmonary disease  Workstation performed: [id number]         CT head wo contrast   Final Result by Jose Daniel Rangel MD (03/07 1358)      Evolving right MCA territory infarcts with associated petechial hemorrhage which is best seen on the recent MRI examination  There is no discrete parenchymal hematoma  Workstation performed: [id number]         XR knee 1 or 2 vw left   Final Result by Jef Gomez MD (03/07 1537)      No acute osseous abnormality              Workstation performed: [id number]         FL barium swallow ROUTINE esophagus    (Results Pending)       *Labs /Radiology studies reviewed  *Medications reviewed and reconciled as needed  *Please refer to order section for additional ordered labs studies  *Case discussed with primary attending during morning huddle case rounds    Physical Examination:  Vitals:   Vitals:    03/11/23 0500 03/11/23 0730 03/11/23 0850 03/11/23 1412   BP: 113/71 115/68  103/67   BP Location: Right arm   Left arm   Pulse: (!) 114 (!) 117 (!) 118 (!) 119   Resp: 20 18  18   Temp: 98 5 °F (36 9 °C) 97 9 °F (36 6 °C)  97 5 °F (36 4 °C)   TempSrc: Oral Oral  Axillary   SpO2: 96%  97% 96%   Weight:       Height:           GEN: No apparent distress, anxious and frail  NEURO: Alert and oriented x3; left neglect, cognitive issues as well as recall issues, mild dysarthria; no confusion this am  HEENT: Pupils are equal and reactive, EOMI, mucous membranes are moist, face asymmetrical  CV: S1 S2 regular, no MRG, no peripheral edema noted  RESP: Lungs are clear bilaterally, no wheezes, rales or rhonchi noted, on room air, respirations easy and non labored  GI: Flat, soft non tender, non distended; +BS x4  : Voiding without difficulty, incontinent at times  MUSC: Moves all extremities; left hemiparesis; right great toe with improved erythema and less painful  SKIN: pink, warm and dry, poor turgor, b/l LE with dressings in place for both prevention and d/t new wound refer to media         The above physical exam was reviewed and updated as determined by my evaluation of the patient on 3/11/2023  Invasive Devices     Peripheral Intravenous Line  Duration           Peripheral IV 03/10/23 Left;Ventral (anterior) Forearm 1 day          Drain  Duration           External Urinary Catheter 10 days                   VTE Pharmacologic Prophylaxis: Eliquis  Code Status: Level 1 - Full Code  Current Length of Stay: 11 day(s)      Total time spent:  30 minutes  with more than 50% spent counseling/coordinating care  Counseling includes discussion with patient re: progress  and discussion with patient of his/her current medical state/information   Coordination of patient's care was performed in conjunction with primary service  Time invested included review of patient's labs, vitals, and management of their comorbidities with continued monitoring  In addition, this patient was discussed with medical team including physician and advanced extenders  The care of the patient was extensively discussed and appropriate treatment plan was formulated unique for this patient  Medical decision making for the day was made by supervising physician unless otherwise noted in their attestation statement  ** Please Note:  voice to text software may have been used in the creation of this document   Although proof errors in transcription or interpretation are a potential of such software**

## 2023-03-12 NOTE — PROGRESS NOTES
03/12/23 1100   Pain Assessment   Pain Assessment Tool 0-10   Pain Score 5   Pain Location/Orientation Orientation: Right;Other (Comment)  (ankle, calf)   Pain Onset/Description Onset: Gradual   Effect of Pain on Daily Activities tolerated ST tasks   Patient's Stated Pain Goal No pain   Hospital Pain Intervention(s) Repositioned   Restrictions/Precautions   Precautions 1:1;Aspiration;Bed/chair alarms;Cognitive; Fall Risk;Contact/isolation;Pain; Impulsive;Supervision on toilet/commode;Visual deficit   Weight Bearing Restrictions No   ROM Restrictions No   Comprehension   QI: Comprehension 3  Usually Understands: Understands most conversations, but misses some part/intent of message  Requires cues at times to understand  Comprehension (FIM) 4 - Understands basic info/conversation 75-90% of time   Expression   QI: Expression 3  Exhibits some difficulty with expressing needs and ideas (e g , some words or finishing thoughts) or speech is not clear   Expression (FIM) 3 - Expresses basic info/needs 50-74% of time   Problem Solving   Problem solving (FIM) 3 - Solves basic problmes 50-74% of time   Memory   Memory (FIM) 3 - Recognizes, recalls/performs 50-74%   Memory Skills   Orientation Level Oriented X4   Speech/Language/Cognition Assessmetn   Treatment Assessment When SLP arrived to pt's room, she was sleeping in recliner chair  Pt's 1:1 sitter reported that pt has been tired/sleeping on and off this morning after poor sleep last night  Pt was very difficult to arouse  She would open her eye briefly, but then quickly fall asleep again and require continued alerting strategies  Eventually, pt was alert and 1:1 assisted with transition to wheelchair in order for SLP to complete oral care (suction/tubing were not long enough to reach recliner chair)  Once SLP moved pt closer to suction, she began falling asleep again   She was able to wake and briefly assisted SLP in holding the suction toothbrush to participate in oral "care; however, she was unable to remain awake  SLP continued to re-alert pt during oral care; however, pt was dependent on SLP to complete remainder of oral care  Pt reported her mouth was dry and she needed a drink  SLP handed pt the cup; however, pt was unable to hold the cup independently (but leaned in as if SLP would provide drink) given reduced/flucutating level of alertness  SLP explained that it is not safe to eat/drink as pt was demonstrated significant fluctuations in her level of alertness  After several minutes of various alerting strategies, pt remained alert enough to transition back to recliner chair  SLP briefly engaged pt in orientation review and pt interview as this SLP is novel to pt  She was oriented x4 and was able to participate in general conversation; therefore, SLP reported pt could have a drink and offered pt lunch tray  Pt recalled her brother talking to staff about concerns for pt's eating and nutrition  Pt reported \"my brother was going on and on yesterday about how I need more protein  \" SLP confirmed with staff that pt's brother was visiting yesterday and voiced concerns for pt's nutrition  Please see below for dysphagia session note     Eating   Type of Assistance Needed Set-up / clean-up;Supervision;Physical assistance   Physical Assistance Level 25% or less   Eating CARE Score 3   Swallow Assessment   Swallow Treatment Assessment Daily Dysphagia Tx Note      Patient Name: Michell YANCEYK Date: 3/12/2023     Current Risks for Dysphagia & Aspiration: Weak voicing; weak cough; dysarthria; general debilitation; new neuro event; brain injury; cognitive deficit; hx neurologic dx; positioning issues     Current Symptoms/Concerns: Cough; throat clear; with liquids; during meals; with pills; difficulty chewing; decreased oral intake; hx of dysphagia/aspiration     Current diet: puree/level 1 diet and nectar thick liquids     Premorbid diet::upon d/c from prior admission, pt " "appeared to be on dysphagia level 2/thin liquids upon prior admission for stroke initially; unknown prior to admission, pt rx being on softer foods as well as thickened liquids in the past        Positioning: upright in recliner        Items administered:  Consistencies Administered: nectar thick and pudding  Materials administered included: pureed chicken & gravy (refused), magic cup, NTL by cup     Total amount of meal consumed:   25% magic cup  ~90cc of NTL by cup         Oral stage:moderate  Lip closure: overall weak lip seal; greater weakness on L side  Anterior spillage: min amounts on L side in ~25% of opps  Manipulation: functional for magic cup (pudding thick)  Bolus formation: weak and mildly disorganized  Bolus control: suspect decreased with NTL and pudding  Transfer: functional with NTL and pudding  Oral residue: none  Pocketing: none       Pharyngeal stage:moderate-severe  Swallow promptness: appeared mild to mild-mod delayed with NTL/pudding  Hyolaryngeal elevation: present to palpation; suspect to be reduced  Please note, SLP observed that pt occasionally held hyolaryngeal rise for several seconds during swallow initiation- provided verbal cues to use effortful and timely swallowing and not to \"hold  \"  Wet voice: x1 with pudding, but cleared with additional swallow  Throat clear: none  Cough: none during PO trials  Please note, pt spontaneously produced an effortful cough at end of session and \"reported that's what they want me to do after I eat  \" SLP confirmed this strategy recommendation with patient  Secondary swallows: intermittently  Audible swallows: inconsistent         Esophageal stage:  No s/sx reported or observed  Pt denied nausea this date  Summary:     Pt presenting s/s suggestive of moderate oral and moderate-severe pharyngeal dysphagia today   Symptoms or concerns included weak lip seal, resulting in small amounts anterior loss on L side, prolonged manipulation, slower and " "disorganized bolus formation, suspected decreased control of NT by cup when taking larger sips, suspected pharyngeal swallow delay, suspected decreased hyolaryngeal elevation upon palpation, suspected pharyngeal residue, multiple swallows, effortful swallow, audible swallows and wet voice, and spontaneous throat clearing during meal       Pt refused pureed chicken and gravy, but agreeable to magic cup, NTL (water and coffee)  Please note, SLP initially had significant difficulty arousing pt prior to meal, but she remained alert throughout meal  Pt continues to require min to moderate verbal cues for taking single and smaller sips of liquid  Please note, pt had significant difficulty with holding cups this date and occasionally required min physical assistance  She often accidentally placed her L fingers into pudding or coffee cup without realizing  Given intermittent difficulty holding cups, pt demonstrated improved pacing and increased use of single sips  Pt recalled and demonstrated use of volitional, effortful cough following PO trials  Pt continues with slower and disorganized oral processing/prep, along with weaker lip seal/oral containment but managed items consumed this date  Transfers of NTL and pudding appeared functional with full oral clearance achieved  Oral transfers were occasionally more prompt at times  Swallow initiation suspected to remain delayed across all items but appeared prompter when given verbal cues to use effortful swallow  Upon palpation, SLP observed that pt occasionally held hyolaryngeal rise for several seconds during swallow initiation- provided verbal cues to use effortful and timely swallowing and not to \"hold,\" which appeared to help decrease this pattern  Please note, OT alerted SLP that pt was accidentally given HTL, but no overt s/sx of aspiration  Pt reportedly enjoyed the HTL and noted it was \"easier\" to drink compared to NTL   Consider continued trials for HTL in " subsequent sessions  Recommendations:  Recommended Diet:  puree/level 1 diet and nectar thick liquids by CUP ONLY-NO STRAWS  Recommended Form of Medications: whole with puree   Aspiration precautions and compensatory swallowing strategies: upright posture, only feed when fully alert, slow rate of feeding, small bites/sips, no straws, effortful swallow, cough every 4-5 bites/sips, quiet environment (tv off, limit talking, door closed, etc ), alternating bites and sips, OOB for ALL meals, MUST be AWAKE/ALERT for meals  Oral care with suction w/ after ALL meals  Once oral care was completed, pt is allowed ICE CHIPS between meals  However, pt MUST be FULLY upright when consuming ice chips  FULL supervision w/ meals  Results reviewed with:  patient and pt's 1:1 sitter   Aspiration precautions posted  Reflux precautions-->Remain upright at least 30 min post meals     F/u ST tx: Pt is recommended for further skilled SLP services targeting dysphagia therapy in order to maximize oral and pharyngeal swallow skills, while safely supporting PO intake, as well as to improve independent carryover of safe swallow strategies  Plan: Focus on use of swallow strategies for ALL meals as pt cont to require increased cuing, which lack of use of strategies results in increased overt s/s aspiration             Monitor pt's current tolerance for diet             Initiate swallow exercises as able            Thin liquid trials under SLP supervision only   Swallow Assessment Prognosis   Prognosis Fair   Prognosis Considerations Age; Co-morbidities; Medical diagnosis; Medical prognosis; Severity of impairments;New learning ability;Ability to carry over   SLP Therapy Minutes   SLP Time In 1100   SLP Time Out 1200   SLP Total Time (minutes) 60   SLP Mode of treatment - Individual (minutes) 60   SLP Mode of treatment - Concurrent (minutes) 0   SLP Mode of treatment - Group (minutes) 0   SLP Mode of treatment - Co-treat (minutes) 0   SLP Mode of Treatment - Total time(minutes) 60 minutes   SLP Cumulative Minutes 690   Therapy Time missed   Time missed?  No

## 2023-03-12 NOTE — PLAN OF CARE
Problem: PAIN - ADULT  Goal: Verbalizes/displays adequate comfort level or baseline comfort level  Description: Interventions:  - Encourage patient to monitor pain and request assistance  - Assess pain using appropriate pain scale  - Administer analgesics based on type and severity of pain and evaluate response  - Implement non-pharmacological measures as appropriate and evaluate response  - Consider cultural and social influences on pain and pain management  - Notify physician/advanced practitioner if interventions unsuccessful or patient reports new pain  Outcome: Progressing     Problem: INFECTION - ADULT  Goal: Absence or prevention of progression during hospitalization  Description: INTERVENTIONS:  - Assess and monitor for signs and symptoms of infection  - Monitor lab/diagnostic results  - Monitor all insertion sites, i e  indwelling lines, tubes, and drains  - Monitor endotracheal if appropriate and nasal secretions for changes in amount and color  - [place] appropriate cooling/warming therapies per order  - Administer medications as ordered  - Instruct and encourage patient and family to use good hand hygiene technique  - Identify and instruct in appropriate isolation precautions for identified infection/condition  Outcome: Progressing  Goal: Absence of fever/infection during neutropenic period  Description: INTERVENTIONS:  - Monitor WBC    Outcome: Progressing     Problem: SAFETY ADULT  Goal: Patient will remain free of falls  Description: INTERVENTIONS:  - Educate patient/family on patient safety including physical limitations  - Instruct patient to call for assistance with activity   - Consult OT/PT to assist with strengthening/mobility   - Keep Call bell within reach  - Keep bed low and locked with side rails adjusted as appropriate  - Keep care items and personal belongings within reach  - Initiate and maintain comfort rounds  - Make Fall Risk Sign visible to staff  - Offer Toileting every Hours, in advance of need  - Initiate/Maintain alarm  - Obtain necessary fall risk management equipment:   - Apply yellow socks and bracelet for high fall risk patients  - Consider moving patient to room near nurses station  Outcome: Progressing  Goal: Maintain or return to baseline ADL function  Description: INTERVENTIONS:  -  Assess patient's ability to carry out ADLs; assess patient's baseline for ADL function and identify physical deficits which impact ability to perform ADLs (bathing, care of mouth/teeth, toileting, grooming, dressing, etc )  - Assess/evaluate cause of self-care deficits   - Assess range of motion  - Assess patient's mobility; develop plan if impaired  - Assess patient's need for assistive devices and provide as appropriate  - Encourage maximum independence but intervene and supervise when necessary  - Involve family in performance of ADLs  - Assess for home care needs following discharge   - Consider OT consult to assist with ADL evaluation and planning for discharge  - Provide patient education as appropriate  Outcome: Progressing  Goal: Maintains/Returns to pre admission functional level  Description: INTERVENTIONS:  - Perform BMAT or MOVE assessment daily    - Set and communicate daily mobility goal to care team and patient/family/caregiver  - Collaborate with rehabilitation services on mobility goals if consulted  - Perform Range of Motion  times a day  - Reposition patient every  hours    - Dangle patient times a day  - Stand patient  times a day  - Ambulate patient  times a day  - Out of bed to chair  times a day   - Out of bed for meals times a day  - Out of bed for toileting  - Record patient progress and toleration of activity level   Outcome: Progressing     Problem: DISCHARGE PLANNING  Goal: Discharge to home or other facility with appropriate resources  Description: INTERVENTIONS:  - Identify barriers to discharge w/patient and caregiver  - Arrange for needed discharge resources and transportation as appropriate  - Identify discharge learning needs (meds, wound care, etc )  - Arrange for interpretive services to assist at discharge as needed  - Refer to Case Management Department for coordinating discharge planning if the patient needs post-hospital services based on physician/advanced practitioner order or complex needs related to functional status, cognitive ability, or social support system  Outcome: Progressing     Problem: Nutrition/Hydration-ADULT  Goal: Nutrient/Hydration intake appropriate for improving, restoring or maintaining nutritional needs  Description: Monitor and assess patient's nutrition/hydration status for malnutrition  Collaborate with interdisciplinary team and initiate plan and interventions as ordered  Monitor patient's weight and dietary intake as ordered or per policy  Utilize nutrition screening tool and intervene as necessary  Determine patient's food preferences and provide high-protein, high-caloric foods as appropriate       INTERVENTIONS:  - Monitor oral intake, urinary output, labs, and treatment plans  - Assess nutrition and hydration status and recommend course of action  - Evaluate amount of meals eaten  - Assist patient with eating if necessary   - Allow adequate time for meals  - Recommend/ encourage appropriate diets, oral nutritional supplements, and vitamin/mineral supplements  - Order, calculate, and assess calorie counts as needed  - Recommend, monitor, and adjust tube feedings and TPN/PPN based on assessed needs  - Assess need for intravenous fluids  - Provide specific nutrition/hydration education as appropriate  - Include patient/family/caregiver in decisions related to nutrition  Outcome: Progressing     Problem: Prexisting or High Potential for Compromised Skin Integrity  Goal: Skin integrity is maintained or improved  Description: INTERVENTIONS:  - Identify patients at risk for skin breakdown  - Assess and monitor skin integrity  - Assess and monitor nutrition and hydration status  - Monitor labs   - Assess for incontinence   - Turn and reposition patient  - Assist with mobility/ambulation  - Relieve pressure over bony prominences  - Avoid friction and shearing  - Provide appropriate hygiene as needed including keeping skin clean and dry  - Evaluate need for skin moisturizer/barrier cream  - Collaborate with interdisciplinary team   - Patient/family teaching  - Consider wound care consult   Outcome: Progressing     Problem: MOBILITY - ADULT  Goal: Maintain or return to baseline ADL function  Description: INTERVENTIONS:  -  Assess patient's ability to carry out ADLs; assess patient's baseline for ADL function and identify physical deficits which impact ability to perform ADLs (bathing, care of mouth/teeth, toileting, grooming, dressing, etc )  - Assess/evaluate cause of self-care deficits   - Assess range of motion  - Assess patient's mobility; develop plan if impaired  - Assess patient's need for assistive devices and provide as appropriate  - Encourage maximum independence but intervene and supervise when necessary  - Involve family in performance of ADLs  - Assess for home care needs following discharge   - Consider OT consult to assist with ADL evaluation and planning for discharge  - Provide patient education as appropriate  Outcome: Progressing  Goal: Maintains/Returns to pre admission functional level  Description: INTERVENTIONS:  - Perform BMAT or MOVE assessment daily    - Set and communicate daily mobility goal to care team and patient/family/caregiver  - Collaborate with rehabilitation services on mobility goals if consulted  - Perform Range of Motion  times a day  - Reposition patient every  hours    - Dangle patient  times a day  - Stand patient  times a day  - Ambulate patient  times a day  - Out of bed to chair  times a day   - Out of bed for meals  times a day  - Out of bed for toileting  - Record patient progress and toleration of activity level   Outcome: Progressing

## 2023-03-12 NOTE — PROGRESS NOTES
"   03/12/23 0868   Pain Assessment   Pain Assessment Tool 0-10   Pain Score 2   Pain Location/Orientation Orientation: Lower; Location: Back   Restrictions/Precautions   Precautions 1:1;Aspiration;Bed/chair alarms;Cognitive; Fall Risk;Contact/isolation;Supervision on toilet/commode;Visual deficit   Weight Bearing Restrictions No   ROM Restrictions No   Lifestyle   Autonomy \"I like to use my face cream \"   Shower/Bathe Self   Type of Assistance Needed Physical assistance;Verbal cues; Set-up / clean-up   Physical Assistance Level 51%-75%   Comment sponge bath, while seated able to bathe LUE and chest, able to partially bathe RUE w/ Cachil DeHe and vc's to inc attention to LUE, req A for thoroughness  Able to bathe BLE w/ dynamic reach, A to thoroughly bathe feet  Min-Mod A to steady in stance to bathe groin and buttocks, A to thoroughly bathe buttocks  Mod multimodal cues to inc L-attention, sequencing, and attention to task to inc safety  Shower/Bathe Self CARE Score 2   Tub/Shower Transfer   Reason Not Assessed Sponge Bath;Safety   Findings would benefit from DRY shower xfer   Upper Body Dressing   Type of Assistance Needed Physical assistance;Verbal cues; Set-up / clean-up   Physical Assistance Level 26%-50%   Comment able to recall hernandez-dressing  able to partially thread LUE w/ A to fully thread, pt able to pull OH and thread RUE  req A to fully pull down despite inc time and vc's provided  Upper Body Dressing CARE Score 3   Lower Body Dressing   Type of Assistance Needed Physical assistance;Verbal cues; Set-up / clean-up   Physical Assistance Level 51%-75%   Comment while seated pt able to partially thread LLE w/ A to fully thread, pt able to thread RLE  Mod A in stance for clothing management w/ pt able to manage over R hip  Provided w/ Max multimodal cues w/ attempt for clothing management over L hip, req A to fully manage     Lower Body Dressing CARE Score 2   Putting On/Taking Off Footwear   Type of Assistance Needed " Physical assistance   Physical Assistance Level 51%-75%   Comment while seated in recliner crossed leg technique to doff socks, A to don socks  Putting On/Taking Off Footwear CARE Score 2   Sit to Stand   Type of Assistance Needed Physical assistance;Verbal cues   Physical Assistance Level 26%-50%   Sit to Stand CARE Score 3   Bed-Chair Transfer   Type of Assistance Needed Physical assistance;Verbal cues; Set-up / clean-up   Physical Assistance Level 25% or less   Comment stand pivot without AD, vc's for pacing   Chair/Bed-to-Chair Transfer CARE Score 3   Neuromuscular Education   Functional Movement Patterns While seated in w/c, engaged in LUE AROM w/ cues to cross midline to tap pt's R shoulder and then to L knee  4x10 to facilitate horizontal ABD and extension pattern  Req vc's to maximize visual tracking of LUE to improve coordination  Pt w/ difficulty tracking 2* L visual field cut and poor divided attention, req ongoing cues to redirect  Tolerated w/ rest breaks provided PRN  Cognition   Overall Cognitive Status Impaired   Comments pt sleeping in recliner upon OT arrival, req inc time to arouse  Req environmental modifications and ongoing stimuli  Once alert, pt agreeable to ADL session  Activity Tolerance   Medical Staff Made Aware HR ranging 118-132 at rest and w/ light activity of getting dressed  RN made aware of same  /58  Spot checked t/o session, SpO2 >96% on RA  Assessment   Treatment Assessment Pt seen for skilled OT session focusing on self-care management and LUE NMR  Details on sponge bath noted above, cont to req extended time to inc arousal from sleeping to include environmental modifications and ongoing stimuli  Once aroused pt participatory and engaged in act  HR ranging 118-132 at rest and w/ light activity, RN made aware of same   Pt cont to be limited by L hemiparesis, L visual field cut, fatigue, dec act tolerance and poor endurance req frequent rest breaks and overall Mod vc's to inc safety w/ fxnl tasks  Cont OT POC: repetitive safety strategies, ADL retraining, LUE NMR, L visual scanning, endurance work, and toileting  Pt agreeable to rest in recliner, all needs within reach and pt encouraged to utilized call bell, alarm activated, 1:1 present  Prognosis Fair   Problem List Decreased strength;Decreased range of motion;Decreased endurance; Impaired balance;Decreased mobility; Decreased cognition;Decreased coordination; Impaired judgement;Decreased safety awareness; Impaired vision   Plan   Treatment/Interventions ADL retraining;Functional transfer training; Therapeutic exercise; Endurance training;Cognitive reorientation;Patient/family training;Equipment eval/education   Progress Slow progress, decreased activity tolerance  (cognitive deficits)   Recommendation   OT Discharge Recommendation Post acute rehabilitation services   OT Therapy Minutes   OT Time In 0830   OT Time Out 1000   OT Total Time (minutes) 90   OT Mode of treatment - Individual (minutes) 90   OT Mode of treatment - Concurrent (minutes) 0   OT Mode of treatment - Group (minutes) 0   OT Mode of treatment - Co-treat (minutes) 0   OT Mode of Treatment - Total time(minutes) 90 minutes   OT Cumulative Minutes 1005   Therapy Time missed   Time missed?  No

## 2023-03-12 NOTE — QUICK NOTE
PMR Quick Note    Chart reviewed  Last BM 3/11  Was able to take 40 mEq of potassium yesterday (pill form cut in manageable pieces), and took potassium again today  HR has improved today  BP remains stable  IM to recheck labs in the AM  Otherwise continue current plan of care      Shawna Bronson MD  Physical Medicine and Rehabilitation

## 2023-03-13 ENCOUNTER — TELEPHONE (OUTPATIENT)
Dept: NEUROLOGY | Facility: CLINIC | Age: 68
End: 2023-03-13

## 2023-03-13 LAB
ANION GAP SERPL CALCULATED.3IONS-SCNC: 3 MMOL/L (ref 4–13)
BASOPHILS # BLD AUTO: 0.02 THOUSANDS/ÂΜL (ref 0–0.1)
BASOPHILS NFR BLD AUTO: 0 % (ref 0–1)
BUN SERPL-MCNC: 13 MG/DL (ref 5–25)
CALCIUM SERPL-MCNC: 9.5 MG/DL (ref 8.3–10.1)
CHLORIDE SERPL-SCNC: 113 MMOL/L (ref 96–108)
CO2 SERPL-SCNC: 22 MMOL/L (ref 21–32)
CREAT SERPL-MCNC: 0.87 MG/DL (ref 0.6–1.3)
EOSINOPHIL # BLD AUTO: 0.13 THOUSAND/ÂΜL (ref 0–0.61)
EOSINOPHIL NFR BLD AUTO: 2 % (ref 0–6)
ERYTHROCYTE [DISTWIDTH] IN BLOOD BY AUTOMATED COUNT: 14.6 % (ref 11.6–15.1)
GFR SERPL CREATININE-BSD FRML MDRD: 68 ML/MIN/1.73SQ M
GLUCOSE P FAST SERPL-MCNC: 82 MG/DL (ref 65–99)
GLUCOSE SERPL-MCNC: 82 MG/DL (ref 65–140)
HCT VFR BLD AUTO: 38.8 % (ref 34.8–46.1)
HGB BLD-MCNC: 12.2 G/DL (ref 11.5–15.4)
IMM GRANULOCYTES # BLD AUTO: 0.03 THOUSAND/UL (ref 0–0.2)
IMM GRANULOCYTES NFR BLD AUTO: 0 % (ref 0–2)
LYMPHOCYTES # BLD AUTO: 1.34 THOUSANDS/ÂΜL (ref 0.6–4.47)
LYMPHOCYTES NFR BLD AUTO: 19 % (ref 14–44)
MCH RBC QN AUTO: 32.3 PG (ref 26.8–34.3)
MCHC RBC AUTO-ENTMCNC: 31.4 G/DL (ref 31.4–37.4)
MCV RBC AUTO: 103 FL (ref 82–98)
MONOCYTES # BLD AUTO: 0.54 THOUSAND/ÂΜL (ref 0.17–1.22)
MONOCYTES NFR BLD AUTO: 7 % (ref 4–12)
NEUTROPHILS # BLD AUTO: 5.19 THOUSANDS/ÂΜL (ref 1.85–7.62)
NEUTS SEG NFR BLD AUTO: 72 % (ref 43–75)
NRBC BLD AUTO-RTO: 0 /100 WBCS
PLATELET # BLD AUTO: 172 THOUSANDS/UL (ref 149–390)
PMV BLD AUTO: 10.3 FL (ref 8.9–12.7)
POTASSIUM SERPL-SCNC: 3.8 MMOL/L (ref 3.5–5.3)
RBC # BLD AUTO: 3.78 MILLION/UL (ref 3.81–5.12)
SODIUM SERPL-SCNC: 138 MMOL/L (ref 135–147)
WBC # BLD AUTO: 7.25 THOUSAND/UL (ref 4.31–10.16)

## 2023-03-13 RX ADMIN — ONDANSETRON 4 MG: 4 TABLET, ORALLY DISINTEGRATING ORAL at 11:22

## 2023-03-13 RX ADMIN — APIXABAN 5 MG: 5 TABLET, FILM COATED ORAL at 17:25

## 2023-03-13 RX ADMIN — LIDOCAINE 5% 1 PATCH: 700 PATCH TOPICAL at 09:52

## 2023-03-13 RX ADMIN — BUPROPION HYDROCHLORIDE TABLETS 100 MG: 100 TABLET, FILM COATED ORAL at 17:25

## 2023-03-13 RX ADMIN — ACETAMINOPHEN 650 MG: 325 TABLET ORAL at 13:27

## 2023-03-13 RX ADMIN — HYDROXYCHLOROQUINE SULFATE 200 MG: 200 TABLET ORAL at 08:33

## 2023-03-13 RX ADMIN — METOPROLOL SUCCINATE 25 MG: 25 TABLET, EXTENDED RELEASE ORAL at 08:30

## 2023-03-13 RX ADMIN — GABAPENTIN 100 MG: 100 CAPSULE ORAL at 08:29

## 2023-03-13 RX ADMIN — MORPHINE SULFATE 7.5 MG: 15 TABLET ORAL at 17:26

## 2023-03-13 RX ADMIN — MORPHINE SULFATE 7.5 MG: 15 TABLET ORAL at 08:39

## 2023-03-13 RX ADMIN — METOPROLOL SUCCINATE 25 MG: 25 TABLET, EXTENDED RELEASE ORAL at 17:27

## 2023-03-13 RX ADMIN — DULOXETINE HYDROCHLORIDE 60 MG: 60 CAPSULE, DELAYED RELEASE ORAL at 08:29

## 2023-03-13 RX ADMIN — LAMOTRIGINE 150 MG: 100 TABLET ORAL at 21:24

## 2023-03-13 RX ADMIN — BUPROPION HYDROCHLORIDE TABLETS 100 MG: 100 TABLET, FILM COATED ORAL at 08:34

## 2023-03-13 RX ADMIN — CINACALCET 30 MG: 30 TABLET, FILM COATED ORAL at 08:34

## 2023-03-13 RX ADMIN — DULOXETINE HYDROCHLORIDE 60 MG: 60 CAPSULE, DELAYED RELEASE ORAL at 21:24

## 2023-03-13 RX ADMIN — GABAPENTIN 100 MG: 100 CAPSULE ORAL at 17:25

## 2023-03-13 RX ADMIN — PANTOPRAZOLE SODIUM 40 MG: 40 TABLET, DELAYED RELEASE ORAL at 05:50

## 2023-03-13 RX ADMIN — PRAZOSIN HYDROCHLORIDE 1 MG: 1 CAPSULE ORAL at 21:25

## 2023-03-13 RX ADMIN — GLYCERIN 1 DROP: .002; .002; .01 SOLUTION/ DROPS OPHTHALMIC at 14:32

## 2023-03-13 RX ADMIN — ACETAMINOPHEN 650 MG: 325 TABLET ORAL at 21:42

## 2023-03-13 RX ADMIN — ATORVASTATIN CALCIUM 40 MG: 40 TABLET, FILM COATED ORAL at 17:25

## 2023-03-13 RX ADMIN — MELATONIN 3 MG: at 21:24

## 2023-03-13 RX ADMIN — APIXABAN 5 MG: 5 TABLET, FILM COATED ORAL at 08:30

## 2023-03-13 NOTE — PROGRESS NOTES
03/13/23 1230   Pain Assessment   Pain Assessment Tool 0-10   Pain Score 5  (pt c/o of back pain during session after ambulating without rolling walker)   Pain Location/Orientation Orientation: Bilateral;Orientation: Lower; Location: Back   Effect of Pain on Daily Activities Increased pain with increased ambulation   Hospital Pain Intervention(s) Medication (See MAR); Rest   Restrictions/Precautions   Precautions 1:1;Aspiration;Bed/chair alarms;Cognitive; Fall Risk;Impulsive;Pain;Supervision on toilet/commode;Contact/isolation   Weight Bearing Restrictions No   ROM Restrictions No   Cognition   Overall Cognitive Status Impaired   Arousal/Participation Alert; Cooperative   Orientation Level Oriented X4   Memory Decreased short term memory   Following Commands Follows one step commands without difficulty   Subjective   Subjective pt awake and ready for therapy  no c/o of pain at beginning of session  Sit to Stand   Type of Assistance Needed Verbal cues; Physical assistance   Physical Assistance Level 25% or less   Comment VC for hand placement   Sit to Stand CARE Score 3   Bed-Chair Transfer   Type of Assistance Needed Verbal cues; Physical assistance   Physical Assistance Level 25% or less   Comment   (VC for hand placement and safe approach to chair)   Chair/Bed-to-Chair Transfer CARE Score 3   Transfer Bed/Chair/Wheelchair   Limitations Noted In Balance; Coordination; Sequencing;UE Strength;LE Strength   Adaptive Equipment Roller Walker   Stand Pivot Minimal Assist;Assist x 1  (completed x3 stand pivot from chair to w/c using rolling walker; VC for hand placement and to keep walker close)   Walk 10 Feet   Type of Assistance Needed Physical assistance   Physical Assistance Level 25% or less   Walk 10 Feet CARE Score 3   Walk 50 Feet with Two Turns   Type of Assistance Needed Verbal cues; Physical assistance   Physical Assistance Level 25% or less   Comment Performed with rolling walker and without AD   Walk 50 Feet with Two Turns CARE Score 3   Ambulation   Primary Mode of Locomotion Prior to Admission Walk   Distance Walked (feet) 50 ft  (x 3)   Assist Device Roller Walker  (utilized for 30-50 x2 of ambulation)   Gait Pattern Forward Flexion;Decreased foot clearance;L hemiparesis; Inconsistant Amy   Limitations Noted In Safety;Balance   Findings Limited by onset of back pain especially when walking without AD   Does the patient walk? 2  Yes   Wheel 50 Feet with Two Turns   Type of Assistance Needed Verbal cues; Supervision   Physical Assistance Level No physical assistance   Comment   (VC for environment scanning for safety awareness)   Wheel 50 Feet with Two Turns CARE Score 4   Wheel 150 Feet   Type of Assistance Needed Verbal cues; Supervision   Physical Assistance Level No physical assistance   Wheel 150 Feet CARE Score 4   Wheelchair mobility   Does the patient use a wheelchair? 1  Yes   Type of Wheelchair Used 1  Manual   Method Right upper extremity;Right lower extremity; Left lower extremity   Assistance Provided For Obstacles  (requires VC for scanning environment)   Distance Level Surface (feet) 200 ft   Curb or Single Stair   Style negotiated Single stair   Type of Assistance Needed Physical assistance   Physical Assistance Level 26%-50%   1 Step (Curb) CARE Score 3   4 Steps   Type of Assistance Needed Physical assistance   Physical Assistance Level 26%-50%   Comment   (BHR and VC for foot placement and sequencing; reciprocal pattern)   4 Steps CARE Score 3   12 Steps   Reason if not Attempted Safety concerns   12 Steps CARE Score 88   Stairs   Type Stairs   # of Steps 6   Weight Bearing Precautions Fall Risk   Assist Devices Bilateral Rail   Toilet Transfer   Type of Assistance Needed Incidental touching;Verbal cues; Physical assistance   Physical Assistance Level 25% or less   Comment   (stand pivot transfer from w/c to toilet and back; required VC and assistance on proper wiping hygiene)   Toilet Transfer CARE Score 3   Therapeutic Interventions   Neuromuscular Re-Education balloon tapping with L UE only with reps as tolerated, kicking ball with L LE only with reps as tolerated; obstacle course with weaving around cones utilizing rolling walker   Assessment   Treatment Assessment Pt tolerated 90 minutes of skilled PT focusing on NMR, ambulation training with rolling walker, transfers, and endurance  Pt experienced increased low back pain after walking with no AD which continued rest of session  Pt had increased forward flexion, decreased balance, and decreased step length when walking with no AD compared to with RW  Pt had difficulty with going around cones demonstrating decreased awareness of obstacles while walking making pt high risk for falls  Pt continues to demonstrate delayed reaction time with balloon tapping  Pt left in recliner with LEs elevated, alarm activated, 1:1 present, and all needs within reach  Pt will continue to benefit from skilled PT to focus on continued NMR training, gait improvement, increased endurance, and increased strengthening  PT will continue working on balance training without AD and mobility training with RW to promote carry over of hand placement and safety awareness  Problem List Decreased strength;Decreased endurance; Impaired balance;Decreased mobility; Decreased coordination;Decreased cognition; Impaired judgement;Decreased safety awareness;Pain   Barriers to Discharge Inaccessible home environment;Decreased caregiver support   Plan   Treatment/Interventions Functional transfer training;LE strengthening/ROM; Endurance training;Cognitive reorientation;Gait training   Progress Progressing toward goals   PT Therapy Minutes   PT Time In 1230   PT Time Out 1400   PT Total Time (minutes) 90   PT Mode of treatment - Individual (minutes) 90   PT Mode of treatment - Concurrent (minutes) 0   PT Mode of treatment - Group (minutes) 0   PT Mode of treatment - Co-treat (minutes) 0   PT Mode of Treatment - Total time(minutes) 90 minutes   PT Cumulative Minutes 775   Therapy Time missed   Time missed?  No

## 2023-03-13 NOTE — PROGRESS NOTES
Progress Note -  [facility]  Amanda Hackett 75 y o  female MRN: [id number]  Unit/Bed#: [facility] 453-01 Encounter: [id number]      ASSESSMENT AND PLAN:      79-year-old female with past medical history of undifferentiated connective tissue disease, bipolar depression, SHAR who was admitted for acute CVA  GI is consulted for dysphagia  1  Oral pharyngeal dysphagia  Secondary to stroke  Patient has been eating 50% of her meals  This has been improving per patient  Low suspicion for esophageal cause  · Low utility for barium esophogram, will discontinue  When that she is on Eliquis after acute stroke low utility for endoscopy or any other GI intervention  · Continue Protonix 40 mg daily  · Discussed risks and benefits for PEG tube should patient not have adequate p o  intake  She does not want PEG tube placement  · Continue follow-up with speech therapy  · Monitor p o  intake, consider calorie count  · Can follow-up as an outpatient  · Gastroenterology to sign off at this time  Please contact with any questions  Rest of care per primary team     ______________________________________________________________________    Subjective: Seen and examined  Eating 50% of her meals  Does have some difficulty with coughing  Bowel movements are normal   Her ROS was negative  She does not want PEG tube placed  REVIEW OF SYSTEMS:    Review of Systems   Constitutional: Negative for chills and fever  HENT: Positive for trouble swallowing  Negative for congestion and sinus pressure  Respiratory: Negative for cough and shortness of breath  Cardiovascular: Negative for chest pain, palpitations and leg swelling  Gastrointestinal: Negative for abdominal pain, diarrhea, nausea and vomiting  Genitourinary: Negative for dysuria and hematuria  Musculoskeletal: Negative for arthralgias and back pain  Skin: Negative for color change and rash  Neurological: Positive for weakness  Negative for dizziness and headaches  Psychiatric/Behavioral: Negative for agitation and confusion  All other systems reviewed and are negative           Historical Information   Past Medical History:   Diagnosis Date   • Anxiety    • Asthma    • [facility] depression ([facility] )    • Chronic narcotic dependence ([facility] )    • Chronic pain    • Depression    • Enterovirus heart infection    • SHAR (obstructive sleep apnea)    • Osteoarthritis    • Peripheral neuropathy    • Plantar fasciitis of right foot    • Senile osteoporosis    • Seronegative arthropathy of multiple sites ([facility] )    • Undifferentiated connective tissue disease (HCC)      Past Surgical History:   Procedure Laterality Date   • BREAST IMPLANT     • FIXATION KYPHOPLASTY LUMBAR SPINE     • HYSTERECTOMY     • INCONTINENCE SURGERY N/A    • IR STROKE ALERT  2/12/2023   • NASAL SEPTOPLASTY W/ TURBINOPLASTY N/A    • RECTAL PROLAPSE REPAIR N/A    • REPAIR RECTOCELE     • RHINOPLASTY N/A      Social History   Social History     Substance and Sexual Activity   Alcohol Use Not Currently    Comment: quit     Social History     Substance and Sexual Activity   Drug Use Yes   • Types: Marijuana    Comment: has medical card     Social History     Tobacco Use   Smoking Status Former   Smokeless Tobacco Never     Family History   Problem Relation Age of Onset   • No Known Problems Mother    • No Known Problems Father    • Arthritis Family    • Diabetes Family    • Stroke Family        Meds/Allergies     Medications Prior to Admission   Medication   • acetaminophen (TYLENOL) 325 mg tablet   • albuterol (PROVENTIL HFA,VENTOLIN HFA) 90 mcg/act inhaler   • apixaban (ELIQUIS) 5 mg   • Artificial Saliva ([facility])   • atorvastatin (LIPITOR) 40 mg tablet   • buPROPion (WELLBUTRIN) 100 mg tablet   • DULOXETINE HCL PO   • gabapentin (NEURONTIN) 300 mg capsule   • hydroxychloroquine (PLAQUENIL) 200 mg tablet   • lamoTRIgine (LaMICtal) 150 MG tablet   • Loratadine 10 MG CAPS • melatonin 3 mg   • metoprolol succinate (TOPROL-XL) 25 mg 24 hr tablet   • morphine (MS CONTIN) 15 mg 12 hr tablet   • polyethylene glycol (MIRALAX) 17 g packet   • senna-docusate sodium (SENOKOT S) 8 6-50 mg per tablet     Current Facility-Administered Medications   Medication Dose Route Frequency   • acetaminophen (TYLENOL) tablet 650 mg  650 mg Oral Q6H PRN   • albuterol (PROVENTIL HFA,VENTOLIN HFA) inhaler 2 puff  2 puff Inhalation Q6H PRN   • aluminum-magnesium hydroxide-simethicone (MYLANTA) oral suspension 30 mL  30 mL Oral Q4H PRN   • apixaban (ELIQUIS) tablet 5 mg  5 mg Oral BID   • atorvastatin (LIPITOR) tablet 40 mg  40 mg Oral QPM   • bisacodyl (DULCOLAX) rectal suppository 10 mg  10 mg Rectal Once   • buPROPion (WELLBUTRIN) tablet 100 mg  100 mg Oral BID   • cinacalcet (SENSIPAR) tablet 30 mg  30 mg Oral Daily With Breakfast   • DULoxetine (CYMBALTA) delayed release capsule 60 mg  60 mg Oral BID   • gabapentin (NEURONTIN) capsule 100 mg  100 mg Oral BID   • glycerin-hypromellose- (ARTIFICIAL TEARS) ophthalmic solution 1 drop  1 drop Both Eyes Q4H PRN   • hydroxychloroquine (PLAQUENIL) tablet 200 mg  200 mg Oral Once per day on Mon Tue Wed Thu Fri   • hydroxychloroquine (PLAQUENIL) tablet 400 mg  400 mg Oral Once per day on Sun Sat   • lamoTRIgine (LaMICtal) tablet 150 mg  150 mg Oral HS   • lidocaine (LIDODERM) 5 % patch 1 patch  1 patch Topical Daily   • LORazepam (ATIVAN) tablet 0 25 mg  0 25 mg Oral BID PRN   • melatonin tablet 3 mg  3 mg Oral HS   • menthol-methyl salicylate (BENGAY) 06-18 % cream   Apply externally 4x Daily PRN   • metoprolol succinate (TOPROL-XL) 24 hr tablet 25 mg  25 mg Oral BID   • morphine (MSIR) IR tablet 7 5 mg  7 5 mg Oral Q6H PRN   • ondansetron (ZOFRAN-ODT) dispersible tablet 4 mg  4 mg Oral Q6H PRN   • pantoprazole (PROTONIX) EC tablet 40 mg  40 mg Oral Early Morning   • polyethylene glycol (MIRALAX) packet 17 g  17 g Oral Daily   • polyethylene glycol "(MIRALAX) packet 17 g  17 g Oral Daily PRN   • prazosin (MINIPRESS) capsule 1 mg  1 mg Oral HS   • senna-docusate sodium (SENOKOT S) 8 6-50 mg per tablet 1 tablet  1 tablet Oral BID   • sodium chloride (OCEAN) 0 65 % nasal spray 1 spray  1 spray Each Nare Q1H PRN       Allergies   Allergen Reactions   • Dye [Iodinated Contrast Media] Anaphylaxis     IVP dye   • Bactrim [Sulfamethoxazole-Trimethoprim] Hives   • Erythromycin Hives   • Penicillins            Objective     Blood pressure 105/61, pulse 99, temperature 97 8 °F (36 6 °C), temperature source Oral, resp  rate 18, height 5' 3\" (1 6 m), weight 59 7 kg (131 lb 9 8 oz), SpO2 97 %  Body mass index is 23 31 kg/m²  Intake/Output Summary (Last 24 hours) at 3/13/2023 1510  Last data filed at 3/13/2023 0915  Gross per 24 hour   Intake 430 ml   Output 650 ml   Net -220 ml         PHYSICAL EXAM:      Physical Exam  Vitals and nursing note reviewed  Constitutional:       General: She is not in acute distress  Appearance: Normal appearance  She is not ill-appearing  HENT:      Head: Normocephalic and atraumatic  Mouth/Throat:      Mouth: Mucous membranes are moist    Eyes:      Extraocular Movements: Extraocular movements intact  Conjunctiva/sclera: Conjunctivae normal    Cardiovascular:      Pulses: Normal pulses  Pulmonary:      Effort: Pulmonary effort is normal    Abdominal:      General: Abdomen is flat  Bowel sounds are normal  There is no distension  Palpations: Abdomen is soft  Tenderness: There is no abdominal tenderness  There is no guarding  Skin:     General: Skin is warm and dry  Neurological:      General: No focal deficit present  Mental Status: She is alert and oriented to person, place, and time  Psychiatric:         Mood and Affect: Mood normal          Behavior: Behavior normal           Lab Results:   No results displayed because visit has over 200 results            Imaging Studies: I have personally " reviewed pertinent imaging studies  [address] [facility] KATIE     Gastroenterology Fellow  PGY-4  Available via [facility]  3/13/2023 3:10 PM

## 2023-03-13 NOTE — PROGRESS NOTES
Internal Medicine Progress Note  Patient: Andres Guzman  Age/sex: 75 y o  female  Medical Record #: [id number]      ASSESSMENT/PLAN: (Interval History)  Andres Guzman is seen and examined and management for following issues:    Recent right MCA infarct W/petechial hemorrhage  • S/P thrombectomy by Dr Zahra Yun 2/12  • Continue Eliquis 5 mg twice daily/statin   • Follow-up with neurosurgery/neurology as scheduled     NICM  • EF has been as low as 10 to 15% in the past  • In 2022 was 65%  • Now down to 30% = pt declined LifeVest  • Cardiology felt this was secondary to her recent CVA as well as possible medication noncompliance  • They recommend continuing beta-blocker and adding ARB when BP allows  • Will need outpatient ischemia work-up  • Followed by Dr Valerie Chau at [facility] however would like to follow with [facility] on dc  • Can add arb if bp improves     Seronegative rheumatoid arthritis  • A/T rheumatology takes Plaquenil 200 mg Monday through Friday 400 mg on Saturday and Sunday  • Continue same  • Follow-up on discharge     Hypercalcemia  · Seen by renal/endocrine on acute side  · Started on Sensipar and now down to 9 5  · Endocrine input appreciated  Will need outpt follow-up and potentially a surgical evaluation  · May possibly need imaging with contrast however will need renal input regarding pretreatment for same  · So far stable    Fall  · 3/6/23 onto left side  · Abrasion on left knee and Xray negative for fracture  · Could not verify whether or not she struck her head  · CT of head stable  · She also c/o L hip pain  X-ray negative for fx  · Fall precautions all in place  On continual observation - PMR to attempt to wean this week      R great toe gout  · S/p prednisone  · Uric acid normal  · improved     Chronic pain  • Due to compression fractures of her thoracic spine/chronic rib fractures  • Lethargic 3/7 and MS contin changed to IR  • Pain management per PMR     Bipolar disorder  • Continue current medication regimen  • Follow-up with psychiatry on discharge  • Neuropsych to follow    Anxiety  · Cont current meds  · Moods are stable    Confusion  · On and off  · Remains on 1:1 for safety s/p fall  · Decreased prednisone d/t same    Tachycardia  · May be 2/2 anxiety/MS contin dc'd  · Monitor for now     SHAR  • Does not use CPAP at home d/t claustraphobia  • OK use oxygen overnight     Dysuria/Urinary frequency/incontinence  • Urine culture +CRE  • s/p vantin 200mg bid through 3/9/23  • Symptoms improved    Hypokalemia  · Improved after replacement  · Repleted kcl 40 meq daily x 2  · K 3 8  · BMP Thursday         DC planning: 3/22/23       The above assessment and plan was reviewed and updated as determined by my evaluation of the patient on 3/13/2023      Labs:   Results from last 7 days   Lab Units 03/13/23  0612 03/07/23  1437   WBC Thousand/uL 7 25 9 35   HEMOGLOBIN g/dL 12 2 14 7   HEMATOCRIT % 38 8 46 1   PLATELETS Thousands/uL 172 264     Results from last 7 days   Lab Units 03/13/23  0612 03/11/23  0551   SODIUM mmol/L 138 145   POTASSIUM mmol/L 3 8 3 4*   CHLORIDE mmol/L 113* 111*   CO2 mmol/L 22 29   BUN mg/dL 13 11   CREATININE mg/dL 0 87 0 96   CALCIUM mg/dL 9 5 10 0             Results from last 7 days   Lab Units 03/07/23  1358   POC GLUCOSE mg/dl 127       Review of Scheduled Meds:  Current Facility-Administered Medications   Medication Dose Route Frequency Provider Last Rate   • acetaminophen  650 mg Oral Q6H PRN Polina Carton, DO     • albuterol  2 puff Inhalation Q6H PRN Polina Carton, DO     • aluminum-magnesium hydroxide-simethicone  30 mL Oral Q4H PRN Polina Carton, DO     • apixaban  5 mg Oral BID Polina Carton, DO     • atorvastatin  40 mg Oral QPM Polina Carton, DO     • bisacodyl  10 mg Rectal Once Maybelle Severance, MD     • buPROPion  100 mg Oral BID Polina Carton, DO     • cinacalcet  30 mg Oral Daily With Julia Clinton MD     • DULoxetine  60 mg Oral BID Klaudia Fonseca, DO     • gabapentin  100 mg Oral BID Su Stone MD     • glycerin-hypromellose-  1 drop Both Eyes Q4H PRN Klaudia Fonseca DO     • hydroxychloroquine  200 mg Oral Once per day on Mon Tue Wed Thu Fri Klaudia Fonseca, DO     • hydroxychloroquine  400 mg Oral Once per day on Sun Sat Klaudia Fonseca DO     • lamoTRIgine  150 mg Oral HS Klaudia Fonseca DO     • lidocaine  1 patch Topical Daily Kobi Camarena MD     • LORazepam  0 25 mg Oral BID PRN Su Stone MD     • melatonin  3 mg Oral HS LAURA Anaya     • menthol-methyl salicylate   Apply externally 4x Daily PRN Kobi Camarena MD     • metoprolol succinate  25 mg Oral BID LAURA Whittington     • morphine  7 5 mg Oral Q6H PRN Su Stone MD     • ondansetron  4 mg Oral Q6H PRN Klaudia Fonseca DO     • pantoprazole  40 mg Oral Early Morning Yassine Boyer MD     • polyethylene glycol  17 g Oral Daily Klaudia Fonseca DO     • polyethylene glycol  17 g Oral Daily PRN Klaudia Fonseca DO     • potassium chloride  40 mEq Oral Daily LAURA Whittington     • prazosin  1 mg Oral HS Klaudia Fonseca DO     • senna-docusate sodium  1 tablet Oral BID Klaudia Fonseca DO     • sodium chloride  1 spray Each Nare Q1H PRN Philipp Vale MD         Subjective/ HPI: Patient seen and examined  Patients overnight issues or events were reviewed with nursing or staff during rounds or morning huddle session  New or overnight issues include the following:     Pt seen in her room  She states that she has back pain this AM  She did request Morphine IR  She denies any other complaints  ROS:   A 10 point ROS was performed; negative except as noted above         Imaging:     FL barium swallow video w speech   Final Result by SYSTEMGENERATED, DOCUMENTATION (03/09 1306)      FL barium swallow video w speech   Final Result by  (03/09 1550)      XR hip/pelv 2-3 vws left if performed   Final Result by Stevie Uribe Teo Love MD (03/08 1633)      No acute osseous abnormality  Workstation performed: [id number]         XR shoulder 2+ vw left   Final Result by Castillo Carbajal MD (03/08 1635)      No acute osseous abnormality  Workstation performed: [id number]         XR spine cervical 2 or 3 vw injury   Final Result by Castillo Carbajal MD (03/08 1643)      No acute osseous abnormality or significant change from priors  Salt-and-pepper appearance of the skull is partially accentuated by the image processing /sharpening algorithm, though prior head CT suggests this is a true finding  Correlate for clinical/laboratory features of hyperparathyroidism  Workstation performed: [id number]         XR chest portable   Final Result by Nieves Love MD (03/07 1650)      No acute cardiopulmonary disease  Workstation performed: [id number]         CT head wo contrast   Final Result by Tamiko Masters MD (03/07 1358)      Evolving right MCA territory infarcts with associated petechial hemorrhage which is best seen on the recent MRI examination  There is no discrete parenchymal hematoma  Workstation performed: [id number]         XR knee 1 or 2 vw left   Final Result by Nieves Love MD (03/07 1537)      No acute osseous abnormality              Workstation performed: [id number]         FL barium swallow ROUTINE esophagus    (Results Pending)       *Labs /Radiology studies reviewed  *Medications reviewed and reconciled as needed  *Please refer to order section for additional ordered labs studies  *Case discussed with primary attending during morning huddle case rounds    Physical Examination:  Vitals:   Vitals:    03/12/23 1000 03/12/23 1348 03/12/23 1957 03/13/23 0549   BP: 101/58 (!) 87/52 110/71 108/63   BP Location: Left arm Left arm Right arm Right arm   Pulse: (!) 118 (!) 108 95 91   Resp:  17 20 20   Temp:  98 1 °F (36 7 °C) (!) 97 4 °F (36 3 °C) 98 3 °F (36 8 °C)   TempSrc:  Axillary Oral Oral   SpO2:  94% 98% 97%   Weight:       Height:           GEN: No apparent distress, sleepy  NEURO: Alert and oriented x3; left neglect, cognitive issues as well as recall issues, mild dysarthria; Not confused today  HEENT: Pupils are equal and reactive, EOMI, mucous membranes are moist, face asymmetrical  CV: S1 S2 regular, no MRG, no peripheral edema noted  RESP: Lungs are clear bilaterally, no wheezes, rales or rhonchi noted, on room air, respirations easy and non labored  GI: Flat, soft non tender, non distended; +BS x4  : Voiding without difficulty, incontinent at times  MUSC: Moves all extremities; left hemiparesis; right great toe with improved erythema and less painful  SKIN: pink, warm and dry, poor turgor, b/l LE with dressings in place for both prevention/current wounds         The above physical exam was reviewed and updated as determined by my evaluation of the patient on 3/13/2023  Invasive Devices     None                    VTE Pharmacologic Prophylaxis: Eliquis  Code Status: Level 1 - Full Code  Current Length of Stay: 13 day(s)      Total time spent:  30 minutes  with more than 50% spent counseling/coordinating care  Counseling includes discussion with patient re: progress  and discussion with patient of his/her current medical state/information  Coordination of patient's care was performed in conjunction with primary service  Time invested included review of patient's labs, vitals, and management of their comorbidities with continued monitoring  In addition, this patient was discussed with medical team including physician and advanced extenders  The care of the patient was extensively discussed and appropriate treatment plan was formulated unique for this patient  Medical decision making for the day was made by supervising physician unless otherwise noted in their attestation statement      ** Please Note:  voice to text software may have been used in the creation of this document   Although proof errors in transcription or interpretation are a potential of such software**

## 2023-03-13 NOTE — PROGRESS NOTES
PM&R PROGRESS NOTE:  Prince Lockhart 75 y o  female MRN: [id number]  Unit/Bed#: -01 Encounter: [id number]      Rehabilitation Diagnosis: Impairment of mobility, safety, Activities of Daily Living (ADLs), and cognitive/communication skills due to Stroke:  01 1  Left Body Involvement (Right Brain)    HPI: Prince Lockhart is a 75 y o  female with history of asthma, anxiety, bipolar depression, chronic narcotic use, previous enteroviral infection of the heart, seronegative arthropathy of multiple joints, undifferentiated connective tissue disorder, obstructive sleep apnea on home CPAP who presented to the [facility] on 2/22 for altered mental status  According to the patient's daughter on chart review, the patient's mental status had been declining for the prior 48 hours to admission  She did receive Narcan which improved her mental status slightly  Of note she had a recent stroke with revascularization on 2/12/2023 with Dr Maggie Villarreal  She was seen by neurology with overall good strength except mildly weak in the lower extremities with brisk reflexes on the left and a positive Haroon's  At this time is felt to be potentially related to polypharmacy in the setting of chronic pain meds versus seizure in the setting of a recent stroke  She was initiated on 81 mg of aspirin daily with a plan of potentially transitioning to Eliquis, plan for EEG  CT of the head was negative  MRI was completed showing a new small acute/recent interval lacunar infarction in the right cerebral pedun there is jacinta  She was started on Eliquis  Nephrology was consulted as well for hypercalcemia  The patient was evaluated by the Rehabilitation team and deemed an appropriate candidate for comprehensive inpatient rehabilitation and admitted to the [facility] on 2/28/2023  3:41 PM patient was seen in room with brother at bedside and all questions regarding the acute inpatient rehabilitation program were answered  We also discussed at length course in the hospital and her last hospitalization to verify events and clarify any imaging or laboratory findings  Patient is eager to start her therapy program       SUBJECTIVE: Patient seen and evaluated at bedside  No acute events overnight  Patient is doing much better over the weekend and today with regards to her overall mentation and arousal   She is participating in therapy and improving because of these changes  Her calcium has gone down to 9 5 which had been as high as 12 and her overall opioid dosing and MME has improved to about 50% of what it was previously  It is unclear due to compounding events but likely combination of polypharmacy in addition to hypercalcemia that was causing some of her alterations in consciousness and participation  I reached out to the patient's brother who had some concerns about her overall nutrition  We discussed what she is eating including her overall labs and albumin, creatinine and BUN  She has made some improvements over the last for 5 days but certainly has some room for improvements  Now that she is more alert she can participate more in we can make alterations in her diet  I did contact the nutrition team will reevaluate and see if add additions of higher protein supplements may be on the table for her  She does continue to be on a nectar thick liquid diet and is taking in a decent amount of nectar thick liquids  Despite having less pain medications she is doing better from a pain perspective with regards to her back and legs  Patient denies fever, chills, nausea, emesis, cough, shortness of breath, diarrhea, or constipation  Sleep was fine, mood stable  Pain is controlled  ASSESSMENT: Stable, progressing      PLAN:    Rehabilitation  • Functional deficits:  Self care and mobility  • Continue current rehabilitation plan of care to maximize function      • Functional update:   Physical Therapy Occupational Therapy Speech Therapy   Weight Bearing Status: Full Weight Bearing  Transfers: Minimal Assistance  Bed Mobility: Minimal Assistance  Amulation Distance (ft): 100 feet (rolling walker)  Ambulation: Minimal Assistance  Assistive Device for Ambulation: Roller Walker  Wheelchair Mobility Distance: 100 ft  Wheelchair Mobility: Supervision  Number of Stairs: 6  Assistive Device for Stairs: Bilateral Hand Rails  Stair Assistance: Moderate Assistance  Discharge Recommendations: Skilled Nursing Facility   Eating: Supervision  Grooming: Minimal Assistance  Bathing: Moderate Assistance  Bathing: Moderate Assistance  Upper Body Dressing: Moderate Assistance  Lower Body Dressing: Minimal Assistance  Toileting: Moderate Assistance  Toilet Transfer: Moderate Assistance  Cognition: Exceptions to WNL  Cognition: Decreased Memory, Impulsive, Decreased Executive Functions, Decreased Attention, Decreased Comprehension, Decreased Safety  Orientation: Person, Place, Time, Situation   Mode of Communication: Verbal  Speech/Language: Dysarthia  Cognition: Exceptions to WNL  Cognition: Decreased Memory, Decreased Executive Functions, Decreased Attention, Decreased Comprehension, Decreased Safety, Impulsive  Orientation: Person, Place, Situation (fluctuates with times of increased confusion and hallucinations)  Swallowing: Exceptions to WNL  Swallowing: Oral Dysphagia, Pharyngeal Dysphagia, Esophageal Dysphagia, Aspiration Risk, Reflux Precautions  Diet Recommendations: Level 1/HeroneCami Thick  Discharge Recommendations: Home with:  [address] with[de-identified] 24 Hour Supervision, 24 Hour Assisteance, Family Support, Home Speech Therapy, Outpatient Speech Therapy       • Estimated Discharge:  To be determined in team conference    DVT prophylaxis  • On Eliquis     Pain  • Cymbalta 60 mg twice daily  • Gabapentin 200 mg twice daily  • Morphine MS Contin 15 mg twice daily  • Acetaminophen 650 mg every 6 hours as needed     Bladder plan  • Continent     Bowel plan  • Continent  • Last bowel movement 3/13     Code Status  • Level 1 full code      * Acute CVA (cerebrovascular accident) [facility])  Assessment & Plan  3/8 - neuro exam stable - continue to monitor closely   CT head 3/7 - Evolving right MCA territory infarcts with associated petechial hemorrhage which is best seen on the recent MRI examination  There is no discrete parenchymal hematoma  - discussed above with radiologist Della - no acute bleeding noted   MRI brain 2/22 - 1  New small acute /more recent interval lacunar infarction in the right cerebral peduncle (series 4, image 14)  2   Extensive multifocal large, previously present right middle cerebral artery infarction which is evolving with superimposed areas of evolving hemorrhage likely related to hemorrhagic transformation of subacute right MCA infarction  No significant   mass effect  3   Mild, chronic microangiopathy  S/P thrombectomy 2/12 by Dr Lazara Daugherty   Currently on a dysphagia 1 diet with thin liquids  Secondary stroke prophylaxis at this time with Eliquis 5 mg twice daily (aspirin d/c'd earlier); and statin; optimal BP control   PT, OT, SLP  Consideration for neuropsychology if patient agreeable  Review modifiable risk factors and provide stroke education  Monitor for poststroke pain  Close neuro follow-up as patient has had multiple strokes in the last 2 months    Hypokalemia  Assessment & Plan  Hypokalemia with potassium down to 3 4 given 40 mill equivalents today on 3/10 but IV infiltrated  Able to get PO dose over weekend  Potasium now WNL    Wound of left ankle  Assessment & Plan  On lateral ankle possibly occurred at time of fall  - Wound care c/s  1  Cleanse L lateral ankle wound with NSS, pat dry, and apply silvasorb gel to the wound bed  Cover with Allevyn foam dressing  Isacc dressing with T  Change every day and as needed for soilage/dislodgement  2  Preventative nursing skin care orders placed  3   Pressure relief, turning and repositioning with foam offloading wedges and waffle cushion to the care  Prevalon boot     Dysphagia  Assessment & Plan  - Current diet: Modified - D1, NTL after failed VBS, GI consulted, plan for esophogram, protonix added  - Recommend VBS study-VBS completed on 3/9 with evidence for esophageal dysmotility and dysphagia recommending puréed with nectar thick, okay for ice chips  - SLP evaluate and treat decline in swallowing   - Aspiration precautions; HOB completely upright when eating, 100% supervision, monitor for pocketing  - Hold oral intake if patient too confused or lethargic and notify MD   - Oral care with meals and bedtime  - Ensure adequate hydration  - Nutrition consult to assist with management   - Rehab nursing and staff to ensure safe and appropriate feeding to decrease risk of aspiration     - Rehab physician oversight dysphagia management and risks associated with it  - Monitor for appropriate fluid and nutrition intake      Gout of foot  Assessment & Plan  Much improved on prednisone  Uric acid wnl   R 1st MTPJ redness and focal tenderness without significant edema or skin breakdown   - IM feels most c/w gout and have ordered prednisone - taper thru 3/12  - Monitor for improvement or non-improvement and other etiologies as well closely     AMS (altered mental status)  Assessment & Plan  Mentation improving as of 3/13 and more consistently after reduction in opioids as well as addition of Sensipar decreasing her overall calcium levels  3/7 Patient with some sleepiness at times apparently last several days with poor activity tolerance and now s/p fall - appears back to recent baseline on follow-up eval   CT head 3/7 - Evolving right MCA territory infarcts with associated petechial hemorrhage which is best seen on the recent MRI examination  There is no discrete parenchymal hematoma   - discussed above with radiologist Della - no acute bleeding noted   - Ensure adequate nutrition and hydration with altered diet and sub-optimal intake BUN/Cr acceptable   - Continue Eliquis, monitor vitals scout  - Comgmt with IM team   - Patient afebrile; CBC wnl; LFTs wnl; Ammonia wnl  - Continue Abx of UTI   - Calcium elevated > optimal mgmt per endo > adding Sensipar   - Hx of SHAR - continue 2L at night O2 with spot checks - refusing CPAP - obtain noct ox on 2L   - Hold standing long-acting MS Contin 15mg Q12H and transitioned to MS IR 7 5mg Q6H PRN   - Gabapentin 100mg BID weaned down recently            UTI (urinary tract infection)  Assessment & Plan  UA last week with culture growing greater than 100,000 CFU Morganella and Serratia  Started on Ciprofloxacin but transitioned to Cefpodixime per prior providers with plan to continue into 3/10   Dysuria improving     History of stroke  Assessment & Plan  Patient with recent admission in early 2023 for right MCA CVA with petechial hemorrhage and residual left-sided weakness and dysarthria  Patient had mechanical thrombectomy with Dr Jose Acosta on 2/12    Obstructive sleep apnea  Assessment & Plan  Patient refusing CPAP 2/2 claustrophobia - refuses home or hospital CPAP   2L O2 at night with spot checks   Obtain noct ox on 2L; repeat noct ox prior to d/c for possible home DME     History of bacteremia  Assessment & Plan  Unclear etiology initially of whether her altered mental status was related to stroke versus polypharmacy versus opioid overdose versus infection versus seizure  1/2 micrococcus with a second set staph coag negative and followed by infectious disease after course of antibiotic treatment  Has been off of antibiotics and repeat blood cultures negative to date, ID felt may be contaminant    Discoloration of skin of foot  Assessment & Plan  N/V intact - no significant discoloration today   Per daughter and patient patient's foot will become blue or red and change colors frequently  No evidence for infectious etiology and had a lower extremity arterial duplex with "no evidence of any significant arterial occlusive disease  May be related to the seronegative spondyloarthropathies  Likely the source of of the inaccurate and inconsistent pulse oximetry readings in the hands as well    Hypercalcemia  Assessment & Plan  Nephrology had been following, nonsuppressed PTH with concerns for primary hyperparathyroidism - they defer to endo on mgmt   C spine XR 3/7 - Salt-and-pepper appearance of the skull is partially accentuated by the image processing /sharpening algorithm, though prior head CT suggests this is a true finding  Correlate for clinical/laboratory features of hyperparathyroidism; Salt-and-pepper appearance of the skull accentuated by image processing/sharpening algorithm  However, prior head CT shows a mottled appearance of the skull as well suggesting hyperparathyroidism  No focal lytic or blastic lesions  No acute fractures  Endocrine consult   \"SPEP-no monoclonal bands, UPEP- no monoclonal bands, PTH RP less than 2, 25-hydroxy vitamin D 41 7, 1,25 hydroxy vitamin D 23 7 low, Phos 2 5 low, PTH 65 4   Assessment/Plan:  1  Hypercalcemia: Suspected to be on the basis of primary hyperparathyroidism: Corrected calcium continues to increase slightly  Sensipar 30 mg daily was started and is being tolerated  Given history of osteoporosis, if appropriate surgical candidate consider surgery referral as outpatient  Follow-up with endocrinology as outpatient  Monitor corrected calcium level  Adjust dose as needed    Status post a CT of the chest abdomen and pelvis with no overt malignancy  - Patient has allergy to contrast - discussed with endo fellow  -Calcium on 3/10 is at 10 2 and down to 9 5 on 3/13      HTN (hypertension)  Assessment & Plan  Patient with some orthostasis  IM consulted and with overall management at their discretion during ARC course  Monitor orthostatics; PRN ADDY hose, abdominal binder - ensure adequate hydration   Currently on metoprolol succinate 25 " "mg twice daily with holding parameters   Plan per cardiology did not add an ARB in the setting of heart failure however as blood pressure allows    Bipolar depression ([address] )  Assessment & Plan  Currently on Wellbutrin 100 mg twice daily, Lamictal 150 mg nightly, and Cymbalta 60 mg twice daily  Consistent with home regimen  Continue monitoring for stability, mood lability  Consider neuropsychology consultation if patient agreeable    Fall  Assessment & Plan  3/7 early morning - per report \"unwitnessed FALL; Per RN patient set off bed alarm, but has already fallen onto her left side  Confused and wanting to wash her face  Once more awake, mentation improved  \"  - CTH evolving infarct without new bleeding per rads  - L knee mild pain but good ROM without significant edema or instability > improving - XR unremarkable - monitor   - Mild L hip pain but good ROM and can adequately WB > improving - L hip/pelvis XR - mild hip OA without acute findings -  monitor  - Some L shoulder pain - some mildly decreased AROM, +Muñoz > stable -  L shoulder XR - unremarkable - monitor    - Subtle neck pain but full ROM without radicular complaints or focal weakness, sensory changes > improving - C spine XR - no acute findings or change from prior - chronic changes > Chronic hyperkyphosis  Anterolisthesis at C4-5 is unchanged  No acute subluxation; Salt-and-pepper appearance of the skull accentuated by image processing/sharpening algorithm  Multilevel moderate to severe discogenic and uncovertebral degenerative changes greatest at C5-6 and C6-7  Multilevel mild facet degenerative changes greatest at C4-5  > monitor   - Optimal mgmt with wakefulness/AMS - continue 2L at night O2 with spot checks - refusing CPAP  - Continue 1:1 for safety     Cervical spondylosis  Assessment & Plan  C spine XR 3/7 - Chronic hyperkyphosis  Anterolisthesis at C4-5 is unchanged    No acute subluxation; Salt-and-pepper appearance of the skull accentuated " by image processing/sharpening algorithm  However, prior head CT shows a mottled appearance of the skull as well suggesting hyperparathyroidism  No focal lytic or blastic lesions  No acute fractures  Multilevel moderate to severe discogenic and uncovertebral degenerative changes greatest at C5-6 and C6-7    Multilevel mild facet degenerative changes greatest at C4-5   - No signs of myelopathy or radiculopathy - monitor     Osteoporosis  Assessment & Plan  History of an L1 compression fracture status post kyphoplasty  Was previously on Prolia has not been on since 2020  Outpatient referral to endocrine    CHF (congestive heart failure) ([facility])  Assessment & Plan  Wt Readings from Last 3 Encounters:   03/08/23 59 7 kg (131 lb 9 8 oz)   02/16/23 62 6 kg (138 lb)   02/12/23 67 9 kg (149 lb 11 1 oz)       Cardiomyopathy with reduced ejection fraction as low as 10 to 15% back in 2004 but has improved since and most recently was 25 to 30% on last admission  CXR 3/7 unremarkable; does not appear volume overloaded   Patient was offered LifeVest but declined  On metoprolol succinate 25 mg twice daily, with plan to add an ARB as her blood pressure would allow  Strict I's and O's and daily weights  Follow[facility] with [facility] Lisa Gutierrez at [facility]      Seronegative arthropathy of multiple sites [facility])  [address] with rheumatology and is on Plaquenil 200 mg Monday through Friday and 400 mg on Saturday Sunday  Continue home regimen and follow-up with rheumatology on discharge    Asthma  Assessment & Plan  3/8 saturating well on RA/2L   3/7 afternoon - patient had questionable desat with therapy but had full improved mentation and was in no resp or other distress at that time  - Comgmt with IM who also evaluated patient at this time    - She historically has had difficulty obtaining O2 from finger tips at times - when pulse ox moved to toe she had good oxygenation on RA-2L  - CXR no acute findings - bibasilar scarring   - "Continue use of albuterol inhaler 2 puffs every 6 hours as needed    Chronic pain syndrome  Assessment & Plan  History of chronic pain secondary to compression fractures in her thoracic spine and chronic rib fractures   Patient with over sleepiness at times at sedating meds decreased some recently   · Holding standing long-acting MS Contin 15mg Q12H and transitioned to MS IR 7 5mg Q6H PRN > pain still adequately controlled > monitor closely   · Gabapentin 100mg BID   · Cymbalta 60 milligrams twice daily  · Acetaminophen 650 mg every 6 hours as needed  · Was previously on oxycodone as needed at [facility] but per family due to concerns for polypharmacy and additional opioids beyond her morphine has not been on an acute care since 2/16  · Lidoderm and Bengay  Aqua K also ordered with instructions not to place over same area  Appreciate IM consultants medical co-management  Labs, medications, and imaging personally reviewed  ROS:  A ten point review of systems was completed on 03/13/23 and pertinent positives are listed in subjective section  All other systems reviewed were negative  OBJECTIVE:   /61 (BP Location: Right arm)   Pulse 99   Temp 97 8 °F (36 6 °C) (Oral)   Resp 18   Ht 5' 3\" (1 6 m)   Wt 59 7 kg (131 lb 9 8 oz)   SpO2 97%   BMI 23 31 kg/m²     Physical Exam  Vitals and nursing note reviewed  Constitutional:       General: She is not in acute distress  HENT:      Head: Atraumatic  Comments: Left-sided facial droop     Right Ear: External ear normal       Left Ear: External ear normal       Nose: Nose normal  No rhinorrhea  Mouth/Throat:      Mouth: Mucous membranes are moist       Pharynx: Oropharynx is clear  Eyes:      General: No scleral icterus  Cardiovascular:      Rate and Rhythm: Normal rate  Pulses: Normal pulses  Heart sounds: Normal heart sounds  Pulmonary:      Effort: Pulmonary effort is normal  No respiratory distress        " Breath sounds: Normal breath sounds  Abdominal:      General: There is no distension  Palpations: Abdomen is soft  Musculoskeletal:      Right lower leg: No edema  Left lower leg: No edema  Skin:     General: Skin is warm and dry  Neurological:      Mental Status: She is oriented to person, place, and time        Comments: Left hemiparesis dysmetria and dysarthria   Psychiatric:         Mood and Affect: Mood normal          Behavior: Behavior normal           Lab Results   Component Value Date    WBC 7 25 03/13/2023    HGB 12 2 03/13/2023    HCT 38 8 03/13/2023     (H) 03/13/2023     03/13/2023     Lab Results   Component Value Date    SODIUM 138 03/13/2023    K 3 8 03/13/2023     (H) 03/13/2023    CO2 22 03/13/2023    BUN 13 03/13/2023    CREATININE 0 87 03/13/2023    GLUC 82 03/13/2023    CALCIUM 9 5 03/13/2023     Lab Results   Component Value Date    INR 0 95 02/22/2023    INR 0 90 02/12/2023    INR 1 12 04/22/2022    PROTIME 12 8 02/22/2023    PROTIME 12 1 02/12/2023    PROTIME 14 0 04/22/2022           Current Facility-Administered Medications:   •  acetaminophen (TYLENOL) tablet 650 mg, 650 mg, Oral, Q6H PRN, Hezzie Cruz, DO, 650 mg at 03/13/23 1327  •  albuterol (PROVENTIL HFA,VENTOLIN HFA) inhaler 2 puff, 2 puff, Inhalation, Q6H PRN, Hezzie Cruz, DO, 2 puff at 03/07/23 1358  •  aluminum-magnesium hydroxide-simethicone (MYLANTA) oral suspension 30 mL, 30 mL, Oral, Q4H PRN, Hezzie Cruz, DO, 30 mL at 03/08/23 1734  •  apixaban (ELIQUIS) tablet 5 mg, 5 mg, Oral, BID, Hezzie Cruz, DO, 5 mg at 03/13/23 0830  •  atorvastatin (LIPITOR) tablet 40 mg, 40 mg, Oral, QPM, Hezzie Cruz, DO, 40 mg at 03/12/23 1818  •  bisacodyl (DULCOLAX) rectal suppository 10 mg, 10 mg, Rectal, Once, Juvenal Turner MD  •  buPROPion [facility]) tablet 100 mg, 100 mg, Oral, BID, Erwin Cruz DO, 100 mg at 03/13/23 1645  •  cinacalcet (SENSIPAR) tablet 30 mg, 30 mg, Oral, Daily With Karly Singer MD, 30 mg at 03/13/23 3590  •  DULoxetine (CYMBALTA) delayed release capsule 60 mg, 60 mg, Oral, BID, Patti Fall DO, 60 mg at 03/13/23 3172  •  gabapentin (NEURONTIN) capsule 100 mg, 100 mg, Oral, BID, Rishabh Dean MD, 100 mg at 03/13/23 8368  •  glycerin-hypromellose- (ARTIFICIAL TEARS) ophthalmic solution 1 drop, 1 drop, Both Eyes, Q4H PRN, Patti Fall DO, 1 drop at 03/13/23 1432  •  hydroxychloroquine (PLAQUENIL) tablet 200 mg, 200 mg, Oral, Once per day on Mon Tue Wed Thu Fri, Carloz Isaac, DO, 200 mg at 03/13/23 0027  •  hydroxychloroquine (PLAQUENIL) tablet 400 mg, 400 mg, Oral, Once per day on Sun Sat, Carloz Parnell DO, 400 mg at 03/12/23 9988  •  lamoTRIgine (LaMICtal) tablet 150 mg, 150 mg, Oral, HS, Patti Fall DO, 150 mg at 03/12/23 2123  •  lidocaine (LIDODERM) 5 % patch 1 patch, 1 patch, Topical, Daily, Renny Hernandez MD, 1 patch at 03/13/23 1651  •  LORazepam (ATIVAN) tablet 0 25 mg, 0 25 mg, Oral, BID PRN, Rishabh Dean MD, 0 25 mg at 03/11/23 1054  •  melatonin tablet 3 mg, 3 mg, Oral, HS, LAURA Rutledge, 3 mg at 03/12/23 2123  •  menthol-methyl salicylate (BENGAY) 19-60 % cream, , Apply externally, 4x Daily PRN, Renny Hernandez MD, Given at 03/06/23 1227  •  metoprolol succinate (TOPROL-XL) 24 hr tablet 25 mg, 25 mg, Oral, BID, LAURA Conley, 25 mg at 03/13/23 0830  •  morphine (MSIR) IR tablet 7 5 mg, 7 5 mg, Oral, Q6H PRN, Rishabh Dean MD, 7 5 mg at 03/13/23 4222  •  ondansetron (ZOFRAN-ODT) dispersible tablet 4 mg, 4 mg, Oral, Q6H PRN, Patti Fall DO, 4 mg at 03/13/23 1122  •  pantoprazole (PROTONIX) EC tablet 40 mg, 40 mg, Oral, Early Morning, Demetria Saini MD, 40 mg at 03/13/23 0550  •  polyethylene glycol (MIRALAX) packet 17 g, 17 g, Oral, Daily, Patti Fall DO, 17 g at 03/11/23 4101  •  polyethylene glycol (MIRALAX) packet 17 g, 17 g, Oral, Daily PRN, Patti Fall DO, 17 g at 03/04/23 1930  •  prazosin (MINIPRESS) capsule 1 mg, 1 mg, Oral, HS, Alex McMinn, DO, 1 mg at 03/12/23 2124  •  senna-docusate sodium (SENOKOT S) 8 6-50 mg per tablet 1 tablet, 1 tablet, Oral, BID, Alex McMinn, DO, 1 tablet at 03/12/23 1818  •  sodium chloride (OCEAN) 0 65 % nasal spray 1 spray, 1 spray, Each Nare, Q1H PRN, Laurel Solomon MD, 1 spray at 03/08/23 0300    Past Medical History:   Diagnosis Date   • Anxiety    • Asthma    • Bipolar depression ([place] [id number] )    • Chronic narcotic dependence (HCC)    • Chronic pain    • Depression    • Enterovirus heart infection    • SHAR (obstructive sleep apnea)    • Osteoarthritis    • Peripheral neuropathy    • Plantar fasciitis of right foot    • Senile osteoporosis    • Seronegative arthropathy of multiple sites [facility])    • Undifferentiated connective tissue disease ([place] [id number] )        Patient Active Problem List    Diagnosis Date Noted   • Acute CVA (cerebrovascular accident) ([place] [id number] ) 02/23/2023   • Hypokalemia 03/10/2023   • AMS (altered mental status) 03/09/2023   • Gout of foot 03/09/2023   • Dysphagia 03/09/2023   • Wound of left ankle 03/09/2023   • UTI (urinary tract infection) 03/06/2023   • Obstructive sleep apnea 02/28/2023   • History of stroke 02/28/2023   • History of bacteremia 02/24/2023   • Hypercalcemia 02/22/2023   • Discoloration of skin of foot 02/22/2023   • Abdominal pain 02/22/2023   • HFrEF (heart failure with reduced ejection fraction) ([place] [id number] ) 02/13/2023   • Stroke ([place] [id number] ) 02/12/2023   • HTN (hypertension) 02/12/2023   • Cardiomyopathy ([place] [id number] ) 02/07/2023   • Vitamin D insufficiency 02/07/2023   • LAILA (acute kidney injury) ([place] [id number] ) 04/23/2022   • Polypharmacy 04/23/2022   • 'light-for-dates' infant with signs of fetal malnutrition 04/22/2022   • Bipolar depression ([place] [id number] ) 03/04/2022   • Rheumatoid arthritis ([facility] ) 03/04/2022   • Closed fracture of multiple ribs of right side 03/02/2022   • Traumatic pneumothorax 03/02/2022   • Closed fracture of transverse process of lumbar vertebra ([place] [id number] ) 03/02/2022   • Liver contusion 03/02/2022   • Fall 03/02/2022   • Acute pain due to trauma 03/02/2022   • Right shoulder pain 03/02/2022   • Severe protein-calorie malnutrition ([place] [id number] ) 03/02/2022   • Undifferentiated connective tissue disease ([place] [id number] ) 12/29/2021   • Primary generalized (osteo)arthritis 12/29/2021   • Peripheral neuropathy 12/29/2021   • Osteoporosis 12/29/2021   • Hx of compression fracture of spine 12/29/2021   • Lumbar spondylosis 12/29/2021   • Cervical spondylosis 12/29/2021   • Anemia 06/26/2021   • Chronic pain syndrome    • Asthma    • Ambulatory dysfunction    • Seronegative arthropathy of multiple sites ([place] [id number] )    • Gastroesophageal reflux disease without esophagitis    • Mixed stress and urge urinary incontinence    • CHF (congestive heart failure) ([place] [id number] )           Magalis Valdes,   Physical Medicine and Priyanka Rice    I have spent a total time of 55 minutes on 03/13/23 in caring for this patient including Patient and family education, Documenting in the medical record, Reviewing / ordering tests, medicine, procedures  , Communicating with other healthcare professionals  and Discussion and planning with patient's family along with the nutrition team in order to maximize her oral intake and protein

## 2023-03-13 NOTE — NURSING NOTE
Patient used call bell appropriately multiple times throughout the day (7a-3p) with 1:1 sitting in the room  Patient rang to ask for help for the bathroom, medications, something to eat/drink, to get dressed/changed etc  and waited for staff to arrive  Patient did not attempt to get up by herself during this 8 hr shift

## 2023-03-13 NOTE — TELEPHONE ENCOUNTER
Pt brother Leonardo Pillion called and asked for Dr Brian De Souza to call him when he gets a chance to discuss his sister's care      # [phone]

## 2023-03-13 NOTE — PLAN OF CARE
Problem: PAIN - ADULT  Goal: Verbalizes/displays adequate comfort level or baseline comfort level  Description: Interventions:  - Encourage patient to monitor pain and request assistance  - Assess pain using appropriate pain scale  - Administer analgesics based on type and severity of pain and evaluate response  - Implement non-pharmacological measures as appropriate and evaluate response  - Consider cultural and social influences on pain and pain management  - Notify physician/advanced practitioner if interventions unsuccessful or patient reports new pain  Outcome: Progressing     Problem: INFECTION - ADULT  Goal: Absence or prevention of progression during hospitalization  Description: INTERVENTIONS:  - Assess and monitor for signs and symptoms of infection  - Monitor lab/diagnostic results  - Monitor all insertion sites, i e  indwelling lines, tubes, and drains  - Monitor endotracheal if appropriate and nasal secretions for changes in amount and color  - [place] appropriate cooling/warming therapies per order  - Administer medications as ordered  - Instruct and encourage patient and family to use good hand hygiene technique  - Identify and instruct in appropriate isolation precautions for identified infection/condition  Outcome: Progressing  Goal: Absence of fever/infection during neutropenic period  Description: INTERVENTIONS:  - Monitor WBC    Outcome: Progressing     Problem: SAFETY ADULT  Goal: Patient will remain free of falls  Description: INTERVENTIONS:  - Educate patient/family on patient safety including physical limitations  - Instruct patient to call for assistance with activity   - Consult OT/PT to assist with strengthening/mobility   - Keep Call bell within reach  - Keep bed low and locked with side rails adjusted as appropriate  - Keep care items and personal belongings within reach  - Initiate and maintain comfort rounds  - Make Fall Risk Sign visible to staff  - Offer Toileting every 2 Hours, in advance of need  - Initiate/Maintain bed/chair alarm  - Obtain necessary fall risk management equipment: alarms  - Apply yellow socks and bracelet for high fall risk patients  - Consider moving patient to room near nurses station  Outcome: Progressing  Goal: Maintain or return to baseline ADL function  Description: INTERVENTIONS:  -  Assess patient's ability to carry out ADLs; assess patient's baseline for ADL function and identify physical deficits which impact ability to perform ADLs (bathing, care of mouth/teeth, toileting, grooming, dressing, etc )  - Assess/evaluate cause of self-care deficits   - Assess range of motion  - Assess patient's mobility; develop plan if impaired  - Assess patient's need for assistive devices and provide as appropriate  - Encourage maximum independence but intervene and supervise when necessary  - Involve family in performance of ADLs  - Assess for home care needs following discharge   - Consider OT consult to assist with ADL evaluation and planning for discharge  - Provide patient education as appropriate  Outcome: Progressing  Goal: Maintains/Returns to pre admission functional level  Description: INTERVENTIONS:  - Perform BMAT or MOVE assessment daily    - Set and communicate daily mobility goal to care team and patient/family/caregiver  - Collaborate with rehabilitation services on mobility goals if consulted  - Perform Range of Motion 3 times a day  - Reposition patient every 2 hours    - Dangle patient 3 times a day  - Stand patient 3 times a day  - Ambulate patient 3 times a day  - Out of bed to chair 3 times a day   - Out of bed for meals 3 times a day  - Out of bed for toileting  - Record patient progress and toleration of activity level   Outcome: Progressing     Problem: DISCHARGE PLANNING  Goal: Discharge to home or other facility with appropriate resources  Description: INTERVENTIONS:  - Identify barriers to discharge w/patient and caregiver  - Arrange for needed discharge resources and transportation as appropriate  - Identify discharge learning needs (meds, wound care, etc )  - Arrange for interpretive services to assist at discharge as needed  - Refer to Case Management Department for coordinating discharge planning if the patient needs post-hospital services based on physician/advanced practitioner order or complex needs related to functional status, cognitive ability, or social support system  Outcome: Progressing     Problem: Nutrition/Hydration-ADULT  Goal: Nutrient/Hydration intake appropriate for improving, restoring or maintaining nutritional needs  Description: Monitor and assess patient's nutrition/hydration status for malnutrition  Collaborate with interdisciplinary team and initiate plan and interventions as ordered  Monitor patient's weight and dietary intake as ordered or per policy  Utilize nutrition screening tool and intervene as necessary  Determine patient's food preferences and provide high-protein, high-caloric foods as appropriate       INTERVENTIONS:  - Monitor oral intake, urinary output, labs, and treatment plans  - Assess nutrition and hydration status and recommend course of action  - Evaluate amount of meals eaten  - Assist patient with eating if necessary   - Allow adequate time for meals  - Recommend/ encourage appropriate diets, oral nutritional supplements, and vitamin/mineral supplements  - Order, calculate, and assess calorie counts as needed  - Recommend, monitor, and adjust tube feedings and TPN/PPN based on assessed needs  - Assess need for intravenous fluids  - Provide specific nutrition/hydration education as appropriate  - Include patient/family/caregiver in decisions related to nutrition  Outcome: Progressing     Problem: Prexisting or High Potential for Compromised Skin Integrity  Goal: Skin integrity is maintained or improved  Description: INTERVENTIONS:  - Identify patients at risk for skin breakdown  - Assess and monitor skin integrity  - Assess and monitor nutrition and hydration status  - Monitor labs   - Assess for incontinence   - Turn and reposition patient  - Assist with mobility/ambulation  - Relieve pressure over bony prominences  - Avoid friction and shearing  - Provide appropriate hygiene as needed including keeping skin clean and dry  - Evaluate need for skin moisturizer/barrier cream  - Collaborate with interdisciplinary team   - Patient/family teaching  - Consider wound care consult   Outcome: Progressing     Problem: MOBILITY - ADULT  Goal: Maintain or return to baseline ADL function  Description: INTERVENTIONS:  -  Assess patient's ability to carry out ADLs; assess patient's baseline for ADL function and identify physical deficits which impact ability to perform ADLs (bathing, care of mouth/teeth, toileting, grooming, dressing, etc )  - Assess/evaluate cause of self-care deficits   - Assess range of motion  - Assess patient's mobility; develop plan if impaired  - Assess patient's need for assistive devices and provide as appropriate  - Encourage maximum independence but intervene and supervise when necessary  - Involve family in performance of ADLs  - Assess for home care needs following discharge   - Consider OT consult to assist with ADL evaluation and planning for discharge  - Provide patient education as appropriate  Outcome: Progressing

## 2023-03-13 NOTE — CASE MANAGEMENT
Case Management Update:  Del spoke to dtr [facility] via telephone to check in and ensure she was aware SNF list was sent to her via email, [email or web address]  [facility] reports her and her family are unable to accomodate pt at home and still looking towards SNF  Cm encouraged [facility] and family to review list and reach out to cm with any questions or concerns

## 2023-03-13 NOTE — PROGRESS NOTES
"   03/13/23 0915   Pain Assessment   Pain Assessment Tool 0-10   Pain Score 3   Pain Location/Orientation Orientation: Lower; Location: Back   Restrictions/Precautions   Precautions 1:1;Aspiration;Bed/chair alarms;Cognitive; Fall Risk;Impulsive;Contact/isolation;Pain;Supervision on toilet/commode;Visual deficit   Weight Bearing Restrictions No   ROM Restrictions No   Lifestyle   Autonomy \"I feel pretty good today  \"   Shower/Bathe Self   Type of Assistance Needed Physical assistance;Verbal cues; Set-up / clean-up   Physical Assistance Level 26%-50%   Comment sponge bath  bathed BUE req multimodal cues to inc attention to LUE, [place] to sustain grasp of washcloth to bathe RUE  Pt bathed BLE while seated w/ dynamic fxnl reach to feet  Min A to steady in stance to bathe santiago and buttocks, req Min vc's to inc L-attention to [place]  Shower/Bathe Self CARE Score 3   Tub/Shower Transfer   Reason Not Assessed Sponge Bath   Findings will benefit from dry shower xfer  Upper Body Dressing   Type of Assistance Needed Physical assistance;Verbal cues; Set-up / clean-up   Physical Assistance Level 51%-75%   Comment pt able to recall hernandez dressing technique  A to fully thread LUE, A to manage around back w/ pt able to thread RUE w/ inc time  A to button shirt  Upper Body Dressing CARE Score 2   Lower Body Dressing   Type of Assistance Needed Physical assistance;Verbal cues; Set-up / clean-up   Physical Assistance Level 51%-75%   Comment Pt able to recall hernandez-dressing technique  while seated in w/c A to fully thread LLE, able to thread RLE  Min A to steady in stance w/ pt able to manage over R hip, multimodal cues to manage in back and over L hip, req A to fully manage  Lower Body Dressing CARE Score 2   Putting On/Taking Off Footwear   Type of Assistance Needed Physical assistance   Physical Assistance Level 26%-50%   Comment able to doff socks w/ dynamic fxnl reach to feet   A to thread over toes, then pt able to manage over " heel    Putting On/Taking Off Footwear CARE Score 3   Sit to Stand   Type of Assistance Needed Physical assistance;Verbal cues; Set-up / clean-up   Physical Assistance Level 26%-50%   Comment cues for proper hand placement, primarily Min A however at times w/ poor attention to task Mod A  Sit to Stand CARE Score 3   Bed-Chair Transfer   Type of Assistance Needed Physical assistance;Verbal cues   Physical Assistance Level 26%-50%   Comment short distance fxnl mobility without AD, Min A for straight path, Mod A w/ turn-taking  Mod vc's to inc L-attention to [place]  Chair/Bed-to-Chair Transfer CARE Score 3   Neuromuscular Education   Weight Bearing Technique Yes   LUE Weight Bearing Extended arm standing   Response to Weight Bearing Technique Pt engaged in tatic standing at table w/ L unilateral support of extended arm w/ focus on weightbearing  OTR provided Min A on pt's L and point of control provided at pt's elbow to allow natural elbow bend  Instructed controlled L lateral lean w/ cues to sustain for 8-10 seconds and return to midline pushing w/ LUE to maximize weightbearing and proprioceptive feedback, RUE rested at pt's side  Completed 3x10  Brief seated rest break between sets, cont to req vc's to inc attention to L hemibody  Cognition   Overall Cognitive Status Impaired   Arousal/Participation Alert; Cooperative   Orientation Level Oriented X4;Oriented to place;Oriented to time;Oriented to situation;Oriented to person   Memory Decreased short term memory;Decreased recall of recent events;Decreased recall of precautions   Following Commands Follows one step commands with increased time or repetition   Comments impaired insight into deficits, dec safety awareness  highly engaged this session  Assessment   Treatment Assessment Pt seen for skilled OT session focusing on self-care management, LUE NMR, and stroke edu: What comes next   Details on sponge bath ADL noted above, completed at overall Mod A w/ Mod cues to inc L-attention and to inc safety w/ fxnl tasks  Pt w/ improved mentation and alertness this session compared to weekend, however cont to remain high fall risk 2* L hemiparesis w/ L-inattention and impaired cognition w/ impulsivity noted at times  Cont to encourage use of call bell, team plan to start weaning 1:1 during the day  Cont OT POC: LUE NMR, repetitive safety training to include consistently using call bell, left visual scanning, LUE NMR, and ADL retraining  Pt was left resting in recliner w/ all needs within reach, alarm activated, and 1:1 present  Prognosis Fair   Problem List Decreased strength;Decreased range of motion;Decreased endurance; Impaired balance;Decreased mobility; Decreased cognition;Decreased coordination; Impaired judgement;Decreased safety awareness; Impaired vision   Plan   Treatment/Interventions ADL retraining;Functional transfer training; Therapeutic exercise; Endurance training;Cognitive reorientation;Patient/family training   Progress Slow progress, cognitive deficits   Recommendation   OT Discharge Recommendation Post acute rehabilitation services   OT Therapy Minutes   OT Time In 0915   OT Time Out 1045   OT Total Time (minutes) 90   OT Mode of treatment - Individual (minutes) 90   OT Mode of treatment - Concurrent (minutes) 0   OT Mode of treatment - Group (minutes) 0   OT Mode of treatment - Co-treat (minutes) 0   OT Mode of Treatment - Total time(minutes) 90 minutes   OT Cumulative Minutes 1095   Therapy Time missed   Time missed?  No

## 2023-03-13 NOTE — PROGRESS NOTES
03/13/23 1140   Pain Assessment   Pain Assessment Tool 0-10   Pain Score 4   Restrictions/Precautions   Precautions 1:1;Aspiration;Bed/chair alarms;Cognitive; Fall Risk;Impulsive;Pain;Contact/isolation;Supervision on toilet/commode   Eating   Type of Assistance Needed Set-up / clean-up;Supervision;Verbal cues   Physical Assistance Level No physical assistance   Eating CARE Score 4   Swallow Assessment   Swallow Treatment Assessment Daily Dysphagia Tx Note      Patient Name: Adryan HERNANDEZ Date: 3/13/2023        Current Risks for Dysphagia & Aspiration: Weak voicing; weak cough; dysarthria; general debilitation; new neuro event; brain injury; cognitive deficit; hx neurologic dx; positioning issues     Current Symptoms/Concerns: Cough; throat clear; with liquids; during meals; with pills; difficulty chewing; decreased oral intake; hx of dysphagia/aspiration     Current diet: puree/level 1 diet and nectar thick liquids      Premorbid diet::upon d/c from prior admission, pt appeared to be on dysphagia level 2/thin liquids upon prior admission for stroke initially; unknown prior to admission, pt rx being on softer foods as well as thickened liquids in the past        Positioning: upright in recliner        Items administered:  Consistencies Administered: nectar thick and puree  Materials administered included: pureed carrots, puree chicken and mashed potatoes, magic cup, NTL by cup     Total amount of meal consumed:   Few bites of puree chicken, carrots and mashed potatoes  50% magic cup  ~120cc of NTL by cup         Oral stage:moderate  Lip closure: overall weak lip seal; greater weakness on L side  Anterior spillage: min amounts with NTL; pt occasionally cleared  Manipulation: moderately prolonged and appearing occasionally labored with puree, prompter with NTL  Bolus formation: disorganized but effective with items  Bolus control: suspect decreased with NTL, especially when taking larger and consecutive sips  Transfer: complete transfers, though slower and appearing more effortful  Oral residue: minimal but cleared with increased time and liquids  Pocketing: none        Pharyngeal stage:moderate-severe  Swallow promptness: appeared mild to moderately delayed but prompter with NTL  Hyolaryngeal elevation: present to palpation; suspect to be reduced  Wet voice: none  Throat clear: none  Cough: x1 at beginning of meal when taking consecutive sips  Secondary swallows: provided with verbal cues to initiate secondary swallows  Audible swallows: fairly consistent, especially with liquids and when completing second swallows        Esophageal stage:  Pt reported to feel nauseous prior to session to which RN administered zofran ~30 min prior to session  Pt denied any nausea at beginning and throughout meal            Summary:     Pt presenting s/s suggestive of moderate oral and moderate-severe pharyngeal dysphagia today  Symptoms or concerns included weak lip seal, resulting in small amounts anterior loss on L, prolonged manipulation, slower and disorganized bolus formation, slower transfers of dry puree items, suspected decreased control of NT by cup when taking larger sips, suspected pharyngeal swallow delay, suspected decreased hyolaryngeal elevation upon palpation, suspected pharyngeal residue, multiple swallows, effortful swallow, audible swallows and cough x1 with NTL  Pt demonstrated self feeding, benefiting from verbal cues to to LUE weakness and decreased awareness of feeding difficulties at times  Pt continued to improve carryover of recommended safe swallow strategies, but did benefit from verbal instruction x2 towards beginning of meal to take only single sips of NTL  Pt with good carryover of single sips, slower rate for remainder of meal  Pt also required verbal cues x3 throughout meal for use of volitional cough  Remains weaker but able to elicit volitional cough        Pt with more open mouth posture this meal which pt was demonstrating as SLP entered room  Pt with weaker bolus retrieval and lip seal this session but maintained fair oral containment despite small loss of liquids throughout  Pt remains with slower and disorganized oral processing/prep  Transfers remain slower but complete and functional with current diet items  Full oral clearance achieved with increased time and use of liquids  Swallow initiation suspected to remain delayed across all items but appeared prompter with NTL  Likely continue with decreased oral control of NTL when taking multiple sips as this resulting in coughing instance at beginning of meal  Pt was observed to gag on the chicken, to which she then declined any additional intake of the item, and took only one bite of mashed potatoes and carrots  However, pt did report she enjoys her magic cup  Pt noted to take her time and also to take rest breaks throughout intake of this item, which appeared to help in tolerance of PO intake as pt denied any reports of nausea/vomiting  Pt still consuming PO at end of session-SLP ensured pt's 1:1 sitter able to supervision and requested oral care with suction to be completed after  1:1 sitter/PCA able to do so  SLP saved pt's pudding for a snack later this afternoon if desired-1:1 sitter and RN aware  Recommendations:  Recommended Diet:  puree/level 1 diet and nectar thick liquids by CUP ONLY-NO STRAWS  Recommended Form of Medications: whole with puree   Aspiration precautions and compensatory swallowing strategies: upright posture, only feed when fully alert, slow rate of feeding, small bites/sips, no straws, effortful swallow, cough every 4-5 bites/sips, quiet environment (tv off, limit talking, door closed, etc ), alternating bites and sips, OOB for ALL meals, MUST be AWAKE/ALERT for meals  Oral care with suction w/ after ALL meals  Once oral care was completed, pt is allowed ICE CHIPS between meals   However, pt MUST be FULLY upright when consuming ice chips  FULL supervision w/ meals  Results reviewed with:  patient, RN and pt's 1:1 sitter   Aspiration precautions posted  Reflux precautions-->Remain upright at least 30 min post meals     F/u ST tx: Pt is recommended for further skilled SLP services targeting dysphagia therapy in order to maximize oral and pharyngeal swallow skills, while safely supporting PO intake, as well as to improve independent carryover of safe swallow strategies  Plan: Focus on use of swallow strategies for ALL meals as pt cont to require increased cuing, which lack of use of strategies results in increased overt s/s aspiration             Monitor pt's current tolerance for diet             Initiate swallow exercises as able            Thin liquid trials under SLP supervision only   SLP Therapy Minutes   SLP Time In 1140   SLP Time Out 1210   SLP Total Time (minutes) 30   SLP Mode of treatment - Individual (minutes) 30   SLP Mode of treatment - Concurrent (minutes) 0   SLP Mode of treatment - Group (minutes) 0   SLP Mode of treatment - Co-treat (minutes) 0   SLP Mode of Treatment - Total time(minutes) 30 minutes   SLP Cumulative Minutes 720   Therapy Time missed   Time missed?  No

## 2023-03-14 RX ADMIN — DULOXETINE HYDROCHLORIDE 60 MG: 60 CAPSULE, DELAYED RELEASE ORAL at 22:04

## 2023-03-14 RX ADMIN — GABAPENTIN 100 MG: 100 CAPSULE ORAL at 08:06

## 2023-03-14 RX ADMIN — ATORVASTATIN CALCIUM 40 MG: 40 TABLET, FILM COATED ORAL at 17:36

## 2023-03-14 RX ADMIN — CINACALCET 30 MG: 30 TABLET, FILM COATED ORAL at 07:20

## 2023-03-14 RX ADMIN — APIXABAN 5 MG: 5 TABLET, FILM COATED ORAL at 08:01

## 2023-03-14 RX ADMIN — ONDANSETRON 4 MG: 4 TABLET, ORALLY DISINTEGRATING ORAL at 15:57

## 2023-03-14 RX ADMIN — DULOXETINE HYDROCHLORIDE 60 MG: 60 CAPSULE, DELAYED RELEASE ORAL at 08:01

## 2023-03-14 RX ADMIN — PANTOPRAZOLE SODIUM 40 MG: 40 TABLET, DELAYED RELEASE ORAL at 06:53

## 2023-03-14 RX ADMIN — BUPROPION HYDROCHLORIDE TABLETS 100 MG: 100 TABLET, FILM COATED ORAL at 17:36

## 2023-03-14 RX ADMIN — POLYETHYLENE GLYCOL 3350 17 G: 17 POWDER, FOR SOLUTION ORAL at 08:02

## 2023-03-14 RX ADMIN — LIDOCAINE 5% 1 PATCH: 700 PATCH TOPICAL at 08:02

## 2023-03-14 RX ADMIN — PRAZOSIN HYDROCHLORIDE 1 MG: 1 CAPSULE ORAL at 22:04

## 2023-03-14 RX ADMIN — APIXABAN 5 MG: 5 TABLET, FILM COATED ORAL at 17:36

## 2023-03-14 RX ADMIN — SENNOSIDES AND DOCUSATE SODIUM 1 TABLET: 8.6; 5 TABLET ORAL at 08:01

## 2023-03-14 RX ADMIN — LAMOTRIGINE 150 MG: 100 TABLET ORAL at 22:05

## 2023-03-14 RX ADMIN — MELATONIN 3 MG: at 22:05

## 2023-03-14 RX ADMIN — MORPHINE SULFATE 7.5 MG: 15 TABLET ORAL at 10:20

## 2023-03-14 RX ADMIN — GABAPENTIN 100 MG: 100 CAPSULE ORAL at 17:36

## 2023-03-14 RX ADMIN — BUPROPION HYDROCHLORIDE TABLETS 100 MG: 100 TABLET, FILM COATED ORAL at 08:02

## 2023-03-14 RX ADMIN — GLYCERIN 1 DROP: .002; .002; .01 SOLUTION/ DROPS OPHTHALMIC at 20:30

## 2023-03-14 RX ADMIN — HYDROXYCHLOROQUINE SULFATE 200 MG: 200 TABLET ORAL at 07:20

## 2023-03-14 RX ADMIN — ACETAMINOPHEN 650 MG: 325 TABLET ORAL at 18:53

## 2023-03-14 RX ADMIN — METOPROLOL SUCCINATE 25 MG: 25 TABLET, EXTENDED RELEASE ORAL at 17:35

## 2023-03-14 RX ADMIN — ALUMINUM HYDROXIDE, MAGNESIUM HYDROXIDE, AND DIMETHICONE 30 ML: 200; 20; 200 SUSPENSION ORAL at 22:15

## 2023-03-14 NOTE — PROGRESS NOTES
"   03/14/23 1761   Pain Assessment   Pain Assessment Tool 0-10   Pain Score No Pain   Restrictions/Precautions   Precautions Aspiration;Bed/chair alarms;Cognitive; Fall Risk;Contact/isolation;Pain;Supervision on toilet/commode;Visual deficit   Comprehension   Comprehension (FIM) 4 - Understands basic info/conversation 75-90% of time   Expression   Expression (FIM) 4 - Expresses basic info/needs 75-90% of time   Social Interaction   Social Interaction (FIM) 5 - Interacts appropriately with others 90% of time   Problem Solving   Problem solving (FIM) 4 - Solves basic problems 75-89% of time   Memory   Memory (FIM) 4 - Recognizes/recalls/performs 75-89%   Speech/Language/Cognition Assessmetn   Treatment Assessment After dysphagia tx session, SLP engaged pt in cognitive tx session targeting basic functional problem solving  However, prior to that task, SLP engaged in brief review given weekend events, visitors, etc, which pt was min A overall in ability to recall, benefiting from probing questions to elicit more information  Of note, pt's RHIANNON was improved from last week, as well as now pt being off 1:1 sitter for the day but remains overnight (from 7p-7a), which pt able to verbalize to SLP  Pt was oriented to month/year, but needing cues for date/[place]  Ability to recall place/city and situation has been improved from last week  During session, pt rang call bell and when asking pt about this, pt continuing to verbalize insight to \"I have to ring this bell anytime I need something, even if you are in here  \" Pt did appropriately ring to use bathroom  After this, SLP able to engage in verbal problem solving task to where pt was to determine problems given courses of actions  Pt's ability to provide logical answers/responses was 15/20 in this, but benefited from probing questions to elicit more \"common\" solutions   Of note, pt did answer more medically based responses for some of the questions provided which still were " accurate, but still attempting to elicit more basic responses as for function as well (ie: use of gloves)  At this time, plan to continue to benefit from skilled SLP services to maximize overall cognitive linguistic skills in attempts to decrease overall caregiver burden over time     Eating   Type of Assistance Needed Set-up / clean-up;Supervision;Verbal cues   Physical Assistance Level No physical assistance   Eating CARE Score 4   Swallow Assessment   Swallow Treatment Assessment   Daily Dysphagia Tx Note     Patient Name: Amaris FUNG Date: 3/14/2023      Current Risks for Dysphagia & Aspiration: Weak voicing; weak cough; dysarthria; general debilitation; new neuro event; brain injury; cognitive deficit; hx neurologic dx; positioning issues     Current Symptoms/Concerns: Cough; throat clear; with liquids; during meals; with pills; difficulty chewing; decreased oral intake; hx of dysphagia/aspiration     Current diet: puree/level 1 diet and nectar thick liquids      Premorbid diet::upon d/c from prior admission, pt appeared to be on dysphagia level 2/thin liquids upon prior admission for stroke initially; unknown prior to admission, pt rx being on softer foods as well as thickened liquids in the past        Positioning: upright in recliner      Items administered:Consistencies Administered: nectar thick and puree  Materials administered included : pureed eggs, yogurt, NTL by cup only     Total amount of meal consumed:   100% of meal and 120cc of NTL       Oral stage:moderate  Lip closure: observed to be improved around fork/tsp/cup today until end of meal given tsp of puree  Anterior spillage: x2 to L side continuing w/ decreased sensation to anterior loss  Manipulation: fairly effortful given dry purees  Bolus formation: minimally incoordinated but once transferred, cohesiveness was achieved  Bolus control: likely decreased w/ NT larger cup sips  Transfer: mild delay w/ NT and moderate delay w/ purees  Oral residue: none  Pocketing: none         Pharyngeal stage:moderate  Swallow promptness: ranged from mild-moderate delaye  Hyolaryngeal elevation: still decreased across consistencies   Wet voice: none  Throat clear: none spontaneously-->SLP providing verbal cues to produce throat clears  Cough: none  Secondary swallows: fairly consistent given both purees/NT likely due to decreased pharyngeal constriction and residual  Audible swallows: increased given NT>puree       Esophageal stage:Minimal amount of belching and hiccups near end of meal        Summary:     Pt presenting with moderate oral and pharyngeal dysphagia today  Symptoms or concerns included decreased bolus propulsion, decreased bolus formation, delayed oral intiation, suspected decreased control of larger sips of NTL , piecemeal deglutition and anterior loss/spillage of puree- only x3 throughout meal suspected pharyngeal swallow delay, suspected decreased hyolaryngeal elevation upon palpation, suspected pharyngeal residue, multiple swallows, effortful swallow, audible swallows and while no overt aspiration sxs were observed given meal, pt remains at risk for silent aspiration  Pt was in recliner for meal, but still requires setup of tray due to deficits of LUE  Today, pt was observed to demonstrate more independence given swallow strategies throughout meal, but continues to benefit from verbal review prior to initiation of meal  Pt did have good recall/carryover of the following: slower rate of intake, smaller bites of puree, eliciting effortful swallows, alternating solids/liquids BUT needed minimal cues for small single sips of NT  Additionally observed was improvement in mentation, RHIANNON and intake given meal today      In session today, pt's lip seal and bolus retrieval given tsp and cup was improved today, BUT near end of meal, pt did exhibit anterior loss x2 w/ purees to L side, continuing to demonstrate decreased sensation to L side "needing verbal cues to clear loss  Pt did have Xerostomia upon initiation of meal, encouraged NT cup sips prior to initiation of puree boluses (as these are noted to be dry)  Bolus transfer of NTL by cup  Was observed to be mildly delayed across sips, but suspect decreased control especially when taking larger sips  Bolus manipulation was noted to be more effortful given initial presentations, but transfer was moderately delayed across puree items  However, pt was not impulsive given rate of intake, allowing full manipulation and no oral residual observed  Swallow initiation did vary from mild-moderately delayed, still w/ decreased hyolaryngeal excursion and likely pharyngeal constriction remaining decreased which leads to pharyngeal residual, eliciting effortful/double swallows given purees>NT  While no overt signs/sxs of aspiration were observed throughout meal, pt does continue to be a high risk per recent VFSS  SLP did provide pt w/ intermittent verbal prompts for throat clear/cough throughout meal  Also audible swallows were highly prevalent given NT > purees, but was increased given yogurt during meal  Near end of meal, pt did have minimal belching and hiccups but no overt reflux/vomiting occurrences  Of note, pt's dtr was present at very end of session, to where SLP providing dtr update of swallow function today in comparison from last Friday (as current SLP had seen pt vs others from Speech team over the weekend)  Did provide update in regards to overall intake given meal, needing less cues for swallow strategies throughout meal, etc  Dtr was please to hear of this  Dtr did ask SLP about f/u w/ GI services, to where SLP provided update to dtr that no esophagram study was completed currently, as services no \"overt\" esophageal deficits noted on VFSS upon completion of scan   SLP did state to dtr that ongoing recommendations would be for pt to f/u w/ primary GI MD as an OP due to the concerns which both SLP, " pt and pt's dtr verbalize at this time, which dtr was in agreement  No further questions provided by dtr at this time  Oral care w/ suction was completed post meal w/o removal of any PO today  Recommendations:  Recommended Diet:  puree/level 1 diet and nectar thick liquids by CUP ONLY-NO STRAWS  Recommended Form of Medications: whole with puree   Aspiration precautions and compensatory swallowing strategies: upright posture, only feed when fully alert, slow rate of feeding, small bites/sips, no straws, effortful swallow, cough every 4-5 bites/sips, quiet environment (tv off, limit talking, door closed, etc ), alternating bites and sips, OOB for ALL meals, MUST be AWAKE/ALERT for meals  Oral care with suction w/ after ALL meals  Once oral care was completed, pt is allowed ICE CHIPS between meals  However, pt MUST be FULLY upright when consuming ice chips  FULL supervision w/ meals    Results reviewed with:  patient, Matthew Boyle MD- Dr Joe Velez, OT, PT, pt's dtr, Moe Chaudhary who came just post session    Aspiration precautions posted  Reflux precautions-->Remain upright at least 30 min post meals     F/u ST tx: Pt is recommended for further skilled SLP services targeting dysphagia therapy in order to maximize oral and pharyngeal swallow skills, while safely supporting PO intake, as well as to improve independent carryover of safe swallow strategies  Plan: Focus on use of swallow strategies for ALL meals as pt cont to require increased cuing, which lack of use of strategies results in increased overt s/s aspiration             Monitor pt's current tolerance for diet             Initiate swallow exercises as able            Thin liquid trials under SLP supervision only   Swallow Assessment Prognosis   Prognosis Fair   Prognosis Considerations Age; Co-morbidities; Medical diagnosis; Medical prognosis;Previous level of function;Severity of impairments;New learning ability   SLP Therapy Minutes   SLP Time In 0830   SLP Time Out 0930   SLP Total Time (minutes) 60   SLP Mode of treatment - Individual (minutes) 0   SLP Mode of treatment - Concurrent (minutes) 60   SLP Mode of treatment - Group (minutes) 0   SLP Mode of treatment - Co-treat (minutes) 0   SLP Mode of Treatment - Total time(minutes) 60 minutes   SLP Cumulative Minutes 780   Therapy Time missed   Time missed?  No

## 2023-03-14 NOTE — PROGRESS NOTES
"   03/14/23 0930   Pain Assessment   Pain Assessment Tool 0-10   Pain Score 9   Pain Location/Orientation Orientation: Lower; Location: Back   Pain Onset/Description Onset: Ongoing   Restrictions/Precautions   Precautions Aspiration;Bed/chair alarms;Cognitive; Fall Risk;Contact/isolation;Pain;Supervision on toilet/commode   General   Change In Medical/Functional Status 1:1 at HS only at this time   Cognition   Overall Cognitive Status Impaired   Subjective   Subjective \"i'm feeling good today\"   Sit to Stand   Type of Assistance Needed Physical assistance;Verbal cues   Physical Assistance Level 25% or less   Comment Marika to CS, more cueing needed for ant wt shift and hand placement   Sit to Stand CARE Score 3   Bed-Chair Transfer   Type of Assistance Needed Physical assistance;Verbal cues   Physical Assistance Level 25% or less   Comment VC for sequencing and safety   Chair/Bed-to-Chair Transfer CARE Score 3   Transfer Bed/Chair/Wheelchair   Findings with more fatigue pt had difficulty managing RW   Car Transfer   Comment (S)  please perform again next session   Walk 10 Feet   Type of Assistance Needed Physical assistance; Adaptive equipment   Physical Assistance Level 25% or less   Comment with RW   Walk 10 Feet CARE Score 3   Walk 50 Feet with Two Turns   Type of Assistance Needed Physical assistance; Adaptive equipment;Verbal cues   Physical Assistance Level 25% or less   Comment with RW   Walk 50 Feet with Two Turns CARE Score 3   Ambulation   Distance Walked (feet) 50 ft  (x2; 100'x3)   Assist Device Roller Walker   Gait Pattern Inconsistant Amy; Slow Amy;Decreased foot clearance; Forward Flexion; Improper weight shift   Limitations Noted In Balance; Coordination;Device Management; Endurance; Heel Strike;Posture; Safety;Speed;Strength   Findings with fatigue, pt has increase fwd flex and poor device management    pt  with decrease scanning and at times unable to safely avoid obstacles in hallway   Therapeutic " Interventions   Other amb with scanning locating room numbers and reading pictures on the wall to work on turns and mobility with RW   had pt washing hands for static standing with functional task   had pt putting pillow cases on 2 pillows, seated, end of session to improve use of LUE   Other Comments   Comments pt drinking nectar thick water and spilled on pants; helped pt out of pants into clean ones end of session   Assessment   Treatment Assessment pt continues to fluctuate with mobility based on fatigue  beginning of session pt able to manage RW safely but with fatigue, pt demonstrates poor hand placement and difficulty maneuvering the RW during walking and functional tasks  pt needs more than 50% cueing for RW and hand placement of LUE   pt participated in constraint therapy end of session with putting the R hand away and using LUE and with help from therapist, putting on pillow cases on pillows  will recommend cont to include in therapy sessions to maximize use of LUE with functional tasks and mobility  Family/Caregiver Present yes, dtr, August   Problem List Decreased strength;Decreased endurance; Impaired balance;Decreased mobility; Decreased coordination;Decreased cognition; Impaired judgement;Decreased safety awareness;Pain   Barriers to Discharge Inaccessible home environment;Decreased caregiver support   PT Barriers   Functional Limitation Ramp negotiation;Stair negotiation;Car transfers;Standing;Transfers; Walking   Plan   Progress Progressing toward goals   Recommendation   PT Discharge Recommendation Post acute rehabilitation services   Equipment Recommended Walker   PT Therapy Minutes   PT Time In 0930   PT Time Out 1115   PT Total Time (minutes) 105   PT Mode of treatment - Individual (minutes) 105   PT Mode of treatment - Concurrent (minutes) 0   PT Mode of treatment - Group (minutes) 0   PT Mode of treatment - Co-treat (minutes) 0   PT Mode of Treatment - Total time(minutes) 105 minutes   PT Cumulative Minutes 880   Therapy Time missed   Time missed?  No

## 2023-03-14 NOTE — PROGRESS NOTES
PM&R PROGRESS NOTE:  Gregor Vickers 75 y o  female MRN: [id number]  Unit/Bed#: -01 Encounter: [id number]      Rehabilitation Diagnosis: Impairment of mobility, safety, Activities of Daily Living (ADLs), and cognitive/communication skills due to Stroke:  01 1  Left Body Involvement (Right Brain)    HPI: Gregor Vickers is a 75 y o  female with history of asthma, anxiety, bipolar depression, chronic narcotic use, previous enteroviral infection of the heart, seronegative arthropathy of multiple joints, undifferentiated connective tissue disorder, obstructive sleep apnea on home CPAP who presented to the [address] [place] [address] on 2/22 for altered mental status  According to the patient's daughter on chart review, the patient's mental status had been declining for the prior 48 hours to admission  She did receive Narcan which improved her mental status slightly  Of note she had a recent stroke with revascularization on 2/12/2023 with Dr Charmaine Khan  She was seen by neurology with overall good strength except mildly weak in the lower extremities with brisk reflexes on the left and a positive Haroon's  At this time is felt to be potentially related to polypharmacy in the setting of chronic pain meds versus seizure in the setting of a recent stroke  She was initiated on 81 mg of aspirin daily with a plan of potentially transitioning to Eliquis, plan for EEG  CT of the head was negative  MRI was completed showing a new small acute/recent interval lacunar infarction in the right cerebral pedun there is jacinta  She was started on Eliquis  Nephrology was consulted as well for hypercalcemia  The patient was evaluated by the Rehabilitation team and deemed an appropriate candidate for comprehensive inpatient rehabilitation and admitted to the [facility] on 2/28/2023  3:41 PM patient was seen in room with brother at bedside and all questions regarding the acute inpatient rehabilitation program were answered  We also discussed at length course in the hospital and her last hospitalization to verify events and clarify any imaging or laboratory findings  Patient is eager to start her therapy program       SUBJECTIVE: Patient seen and evaluated in room and therapy sessions  Continues with good mentation and arousal and is able to participate in therapy  She is participating fully in her speech therapy sessions with improvements in overall overall safety with comes to her oral intake of different foods  Continues on nectar thick liquids without overt coughing  We are weaning down her one-to-one continuous observation from 24/7 down to 7 PM to 7 AM   She has a tendency to get up overnight to use the restroom  We discussed at length a plan to limit her p o  fluid intake past 6 PM and to toilet her prior to bedtime in order to try and avoid this issue  Discussed case during team conference and also with the daughter and provided updates on all of her recent laboratory findings and answered all questions  Discussion with nutrition team last evening in order to accommodate higher protein supplements during meals  Patient denies fever, chills, nausea, emesis, cough, shortness of breath, diarrhea, or constipation  Sleep was fine, mood stable  Pain is controlled  ASSESSMENT: Stable, progressing    PLAN:    Rehabilitation  • Functional deficits:  Self care and mobility  • Continue current rehabilitation plan of care to maximize function  • Functional update:   Physical Therapy Occupational Therapy Speech Therapy   Weight Bearing Status: Full Weight Bearing  Transfers: Minimal Assistance  Bed Mobility: Minimal Assistance  Amulation Distance (ft): 100 feet (rolling walker)  Ambulation: Minimal Assistance  Assistive Device for Ambulation: Roller Walker  Wheelchair Mobility Distance: 100 ft  Wheelchair Mobility: Supervision  Number of Stairs: 6  Assistive Device for Stairs: Bilateral Hand Rails  Stair Assistance:  Moderate Assistance  Discharge Recommendations: Skilled Nursing Facility   Eating: Supervision  Grooming: Minimal Assistance  Bathing: Moderate Assistance  Bathing: Moderate Assistance  Upper Body Dressing: Moderate Assistance  Lower Body Dressing: Minimal Assistance  Toileting: Moderate Assistance  Toilet Transfer: Moderate Assistance  Cognition: Exceptions to WNL  Cognition: Decreased Memory, Impulsive, Decreased Executive Functions, Decreased Attention, Decreased Comprehension, Decreased Safety  Orientation: Person, Place, Time, Situation   Mode of Communication: Verbal  Speech/Language: Dysarthia  Cognition: Exceptions to WNL  Cognition: Decreased Memory, Decreased Executive Functions, Decreased Attention, Decreased Comprehension, Decreased Safety, Impulsive  Orientation: Person, Place, Situation (fluctuates with times of increased confusion and hallucinations)  Swallowing: Exceptions to WNL  Swallowing: Oral Dysphagia, Pharyngeal Dysphagia, Esophageal Dysphagia, Aspiration Risk, Reflux Precautions  Diet Recommendations: Level 1/Cami Garvey  Discharge Recommendations: Home with:  [address] with[de-identified] 24 Hour Supervision, 24 Hour Assisteance, Family Support, Home Speech Therapy, Outpatient Speech Therapy       • Estimated Discharge: To be determined in team conference    DVT prophylaxis  • On Eliquis     Pain  • Cymbalta 60 mg twice daily  • Gabapentin 100 mg twice daily  • Morphine 7 5 mg 4 times a day as needed  • Acetaminophen 650 mg every 6 hours as needed     Bladder plan  • Continent     Bowel plan  • Continent  • Last bowel movement 3/13     Code Status  • Level 1 full code      * Acute CVA (cerebrovascular accident) [facility])  Assessment & Plan  3/8 - neuro exam stable - continue to monitor closely   CT head 3/7 - Evolving right MCA territory infarcts with associated petechial hemorrhage which is best seen on the recent MRI examination  There is no discrete parenchymal hematoma   - discussed above with radiologist Della - no acute bleeding noted   MRI brain 2/22 - 1  New small acute /more recent interval lacunar infarction in the right cerebral peduncle (series 4, image 14)  2   Extensive multifocal large, previously present right middle cerebral artery infarction which is evolving with superimposed areas of evolving hemorrhage likely related to hemorrhagic transformation of subacute right MCA infarction  No significant   mass effect  3   Mild, chronic microangiopathy  S/P thrombectomy 2/12 by Dr Wilton Connors   Currently on a dysphagia 1 diet with thin liquids  Secondary stroke prophylaxis at this time with Eliquis 5 mg twice daily (aspirin d/c'd earlier); and statin; optimal BP control   PT, OT, SLP  Consideration for neuropsychology if patient agreeable  Review modifiable risk factors and provide stroke education  Monitor for poststroke pain  Close neuro follow-up as patient has had multiple strokes in the last 2 months    Hypokalemia  Assessment & Plan  Hypokalemia with potassium down to 3 4 given 40 mill equivalents today on 3/10 but IV infiltrated  Able to get PO dose over weekend  Potasium now WNL    Wound of left ankle  Assessment & Plan  On lateral ankle possibly occurred at time of fall  - Wound care c/s  1  Cleanse L lateral ankle wound with NSS, pat dry, and apply silvasorb gel to the wound bed  Cover with Allevyn foam dressing  Isacc dressing with T  Change every day and as needed for soilage/dislodgement  2  Preventative nursing skin care orders placed  3   Pressure relief, turning and repositioning with foam offloading wedges and waffle cushion to the care  Prevalon boot     Dysphagia  Assessment & Plan  - Current diet: Modified - D1, NTL after failed VBS, GI consulted, plan for esophogram, protonix added  - Recommend VBS study-VBS completed on 3/9 with evidence for esophageal dysmotility and dysphagia recommending puréed with nectar thick, okay for ice chips  - SLP evaluate and treat decline in swallowing   - Aspiration precautions; HOB completely upright when eating, 100% supervision, monitor for pocketing  - Hold oral intake if patient too confused or lethargic and notify MD   - Oral care with meals and bedtime  - Ensure adequate hydration  - Nutrition consult to assist with management   - Rehab nursing and staff to ensure safe and appropriate feeding to decrease risk of aspiration     - Rehab physician oversight dysphagia management and risks associated with it  - Monitor for appropriate fluid and nutrition intake      Gout of foot  Assessment & Plan  Much improved on prednisone  Uric acid wnl   R 1st MTPJ redness and focal tenderness without significant edema or skin breakdown   - IM feels most c/w gout and have ordered prednisone - taper thru 3/12  - Monitor for improvement or non-improvement and other etiologies as well closely     AMS (altered mental status)  Assessment & Plan  Mentation improving as of 3/13 and more consistently after reduction in opioids as well as addition of Sensipar decreasing her overall calcium levels  3/7 Patient with some sleepiness at times apparently last several days with poor activity tolerance and now s/p fall - appears back to recent baseline on follow-up eval   CT head 3/7 - Evolving right MCA territory infarcts with associated petechial hemorrhage which is best seen on the recent MRI examination  There is no discrete parenchymal hematoma   - discussed above with radiologist Della - no acute bleeding noted   - Ensure adequate nutrition and hydration with altered diet and sub-optimal intake BUN/Cr acceptable   - Continue Eliquis, monitor vitals scout  - Comgmt with IM team   - Patient afebrile; CBC wnl; LFTs wnl; Ammonia wnl  - Continue Abx of UTI   - Calcium elevated > optimal mgmt per endo > adding Sensipar   - Hx of SHAR - continue 2L at night O2 with spot checks - refusing CPAP - obtain noct ox on 2L   - Hold standing long-acting MS Contin 15mg Q12H and transitioned to MS IR 7 5mg Q6H PRN   - Gabapentin 100mg BID weaned down recently            UTI (urinary tract infection)  Assessment & Plan  UA last week with culture growing greater than 100,000 CFU Morganella and Serratia  Started on Ciprofloxacin but transitioned to Cefpodixime per prior providers with plan to continue into 3/10   Dysuria improving     History of stroke  Assessment & Plan  Patient with recent admission in early 2023 for right MCA CVA with petechial hemorrhage and residual left-sided weakness and dysarthria  Patient had mechanical thrombectomy with Dr Torrey Lan on 2/12    Obstructive sleep apnea  Assessment & Plan  Patient refusing CPAP 2/2 claustrophobia - refuses home or hospital CPAP   2L O2 at night with spot checks   Obtain noct ox on 2L; repeat noct ox prior to d/c for possible home DME     History of bacteremia  Assessment & Plan  Unclear etiology initially of whether her altered mental status was related to stroke versus polypharmacy versus opioid overdose versus infection versus seizure  1/2 micrococcus with a second set staph coag negative and followed by infectious disease after course of antibiotic treatment  Has been off of antibiotics and repeat blood cultures negative to date, ID felt may be contaminant    Discoloration of skin of foot  Assessment & Plan  N/V intact - no significant discoloration today   Per daughter and patient patient's foot will become blue or red and change colors frequently  No evidence for infectious etiology and had a lower extremity arterial duplex with no evidence of any significant arterial occlusive disease  May be related to the seronegative spondyloarthropathies  Likely the source of of the inaccurate and inconsistent pulse oximetry readings in the hands as well    Hypercalcemia  Assessment & Plan  Nephrology had been following, nonsuppressed PTH with concerns for primary hyperparathyroidism - they defer to endo on mgmt   C spine XR 3/7 - Salt-and-pepper appearance of the "skull is partially accentuated by the image processing /sharpening algorithm, though prior head CT suggests this is a true finding  Correlate for clinical/laboratory features of hyperparathyroidism; Salt-and-pepper appearance of the skull accentuated by image processing/sharpening algorithm  However, prior head CT shows a mottled appearance of the skull as well suggesting hyperparathyroidism  No focal lytic or blastic lesions  No acute fractures  Endocrine consult   \"SPEP-no monoclonal bands, UPEP- no monoclonal bands, PTH RP less than 2, 25-hydroxy vitamin D 41 7, 1,25 hydroxy vitamin D 23 7 low, Phos 2 5 low, PTH 65 4   Assessment/Plan:  1  Hypercalcemia: Suspected to be on the basis of primary hyperparathyroidism: Corrected calcium continues to increase slightly  Sensipar 30 mg daily was started and is being tolerated  Given history of osteoporosis, if appropriate surgical candidate consider surgery referral as outpatient  Follow-up with endocrinology as outpatient  Monitor corrected calcium level  Adjust dose as needed  Status post a CT of the chest abdomen and pelvis with no overt malignancy  - Patient has allergy to contrast - discussed with endo fellow  -Calcium on 3/10 is at 10 2 and down to 9 5 on 3/13      HTN (hypertension)  Assessment & Plan  Patient with some orthostasis  IM consulted and with overall management at their discretion during ARC course  Monitor orthostatics; PRN ADDY hose, abdominal binder - ensure adequate hydration   Currently on metoprolol succinate 25 mg twice daily with holding parameters   Plan per cardiology did not add an ARB in the setting of heart failure however as blood pressure allows    Bipolar depression ([facility] [address] )  Assessment & Plan  Currently on Wellbutrin 100 mg twice daily, Lamictal 150 mg nightly, and Cymbalta 60 mg twice daily  Consistent with home regimen  Continue monitoring for stability, mood lability    Consider neuropsychology consultation if patient " "agreeable    Fall  Assessment & Plan  3/7 early morning - per report \"unwitnessed FALL; Per RN patient set off bed alarm, but has already fallen onto her left side  Confused and wanting to wash her face  Once more awake, mentation improved  \"  - CTH evolving infarct without new bleeding per rads  - L knee mild pain but good ROM without significant edema or instability > improving - XR unremarkable - monitor   - Mild L hip pain but good ROM and can adequately WB > improving - L hip/pelvis XR - mild hip OA without acute findings -  monitor  - Some L shoulder pain - some mildly decreased AROM, +Muñoz > stable -  L shoulder XR - unremarkable - monitor    - Subtle neck pain but full ROM without radicular complaints or focal weakness, sensory changes > improving - C spine XR - no acute findings or change from prior - chronic changes > Chronic hyperkyphosis  Anterolisthesis at C4-5 is unchanged  No acute subluxation; Salt-and-pepper appearance of the skull accentuated by image processing/sharpening algorithm  Multilevel moderate to severe discogenic and uncovertebral degenerative changes greatest at C5-6 and C6-7  Multilevel mild facet degenerative changes greatest at C4-5  > monitor   - Optimal mgmt with wakefulness/AMS - continue 2L at night O2 with spot checks - refusing CPAP  - Continue 1:1 for safety     Cervical spondylosis  Assessment & Plan  C spine XR 3/7 - Chronic hyperkyphosis  Anterolisthesis at C4-5 is unchanged  No acute subluxation; Salt-and-pepper appearance of the skull accentuated by image processing/sharpening algorithm  However, prior head CT shows a mottled appearance of the skull as well suggesting hyperparathyroidism  No focal lytic or blastic lesions  No acute fractures  Multilevel moderate to severe discogenic and uncovertebral degenerative changes greatest at C5-6 and C6-7    Multilevel mild facet degenerative changes greatest at C4-5   - No signs of myelopathy or radiculopathy - " monitor     Osteoporosis  Assessment & Plan  History of an L1 compression fracture status post kyphoplasty  Was previously on Prolia has not been on since 2020  Outpatient referral to endocrine    CHF (congestive heart failure) ([facility])  Assessment & Plan  Wt Readings from Last 3 Encounters:   03/08/23 59 7 kg (131 lb 9 8 oz)   02/16/23 62 6 kg (138 lb)   02/12/23 67 9 kg (149 lb 11 1 oz)       Cardiomyopathy with reduced ejection fraction as low as 10 to 15% back in 2004 but has improved since and most recently was 25 to 30% on last admission  CXR 3/7 unremarkable; does not appear volume overloaded   Patient was offered LifeVest but declined  On metoprolol succinate 25 mg twice daily, with plan to add an ARB as her blood pressure would allow  Strict I's and O's and daily weights  Follows with [facility] Jay[facility]ree Sarm[facility]      Seronegative arthropathy of multiple sites [facility])  [address] with rheumatology and is on Plaquenil 200 mg Monday through Friday and 400 mg on Saturday Sunday  Continue home regimen and follow-up with rheumatology on discharge    Asthma  Assessment & Plan  3/8 saturating well on RA/2L   3/7 afternoon - patient had questionable desat with therapy but had full improved mentation and was in no resp or other distress at that time  - Comgmt with IM who also evaluated patient at this time    - She historically has had difficulty obtaining O2 from finger tips at times - when pulse ox moved to toe she had good oxygenation on RA-2L  - CXR no acute findings - bibasilar scarring   - Continue use of albuterol inhaler 2 puffs every 6 hours as needed    Chronic pain syndrome  Assessment & Plan  History of chronic pain secondary to compression fractures in her thoracic spine and chronic rib fractures   Patient with over sleepiness at times at sedating meds decreased some recently   · Holding standing long-acting MS Contin 15mg Q12H and transitioned to MS IR 7 5mg Q6H PRN > pain still adequately "controlled > monitor closely   · Gabapentin 100mg BID   · Cymbalta 60 milligrams twice daily  · Acetaminophen 650 mg every 6 hours as needed  · Was previously on oxycodone as needed at [facility] but per family due to concerns for polypharmacy and additional opioids beyond her morphine has not been on an acute care since 2/16  · Lidoderm and Bengay  Aqua K also ordered with instructions not to place over same area  Appreciate IM consultants medical co-management  Labs, medications, and imaging personally reviewed  ROS:  A ten point review of systems was completed on 03/14/23 and pertinent positives are listed in subjective section  All other systems reviewed were negative  OBJECTIVE:   BP 93/62   Pulse (!) 108   Temp (!) 97 3 °F (36 3 °C) (Axillary)   Resp 18   Ht 5' 3\" (1 6 m)   Wt 59 7 kg (131 lb 9 8 oz)   SpO2 100%   BMI 23 31 kg/m²     Physical Exam  Vitals and nursing note reviewed  Constitutional:       General: She is not in acute distress  HENT:      Head: Atraumatic  Comments: Left-sided facial droop     Right Ear: External ear normal       Left Ear: External ear normal       Nose: Nose normal  No rhinorrhea  Mouth/Throat:      Mouth: Mucous membranes are moist       Pharynx: Oropharynx is clear  Eyes:      General: No scleral icterus  Cardiovascular:      Rate and Rhythm: Normal rate  Pulses: Normal pulses  Heart sounds: Normal heart sounds  Pulmonary:      Effort: Pulmonary effort is normal  No respiratory distress  Breath sounds: Normal breath sounds  Abdominal:      General: There is no distension  Palpations: Abdomen is soft  Musculoskeletal:      Right lower leg: No edema  Left lower leg: No edema  Skin:     General: Skin is warm and dry  Neurological:      Mental Status: She is oriented to person, place, and time        Comments: Left hemiparesis dysmetria, dysarthria and left neglect   Psychiatric:         Mood " and Affect: Mood normal          Behavior: Behavior normal           Lab Results   Component Value Date    WBC 7 25 03/13/2023    HGB 12 2 03/13/2023    HCT 38 8 03/13/2023     (H) 03/13/2023     03/13/2023     Lab Results   Component Value Date    SODIUM 138 03/13/2023    K 3 8 03/13/2023     (H) 03/13/2023    CO2 22 03/13/2023    BUN 13 03/13/2023    CREATININE 0 87 03/13/2023    GLUC 82 03/13/2023    CALCIUM 9 5 03/13/2023     Lab Results   Component Value Date    INR 0 95 02/22/2023    INR 0 90 02/12/2023    INR 1 12 04/22/2022    PROTIME 12 8 02/22/2023    PROTIME 12 1 02/12/2023    PROTIME 14 0 04/22/2022           Current Facility-Administered Medications:   •  acetaminophen (TYLENOL) tablet 650 mg, 650 mg, Oral, Q6H PRN, Fran Moose, DO, 650 mg at 03/13/23 2142  •  albuterol (PROVENTIL HFA,VENTOLIN HFA) inhaler 2 puff, 2 puff, Inhalation, Q6H PRN, Fran Moose, DO, 2 puff at 03/07/23 1358  •  aluminum-magnesium hydroxide-simethicone (MYLANTA) oral suspension 30 mL, 30 mL, Oral, Q4H PRN, Fran Moose, DO, 30 mL at 03/08/23 1734  •  apixaban (ELIQUIS) tablet 5 mg, 5 mg, Oral, BID, Fran Moose, DO, 5 mg at 03/14/23 9674  •  atorvastatin (LIPITOR) tablet 40 mg, 40 mg, Oral, QPM, Fran Moose, DO, 40 mg at 03/13/23 1725  •  bisacodyl (DULCOLAX) rectal suppository 10 mg, 10 mg, Rectal, Once, Tamiko Rayo MD  •  buPROPion [facility]) tablet 100 mg, 100 mg, Oral, BID, Fran Moose, DO, 100 mg at 03/14/23 0802  •  cinacalcet (SENSIPAR) tablet 30 mg, 30 mg, Oral, Daily With Breakfast, Anika Ramires MD, 30 mg at 03/14/23 0720  •  DULoxetine (CYMBALTA) delayed release capsule 60 mg, 60 mg, Oral, BID, Fran Humphrey DO, 60 mg at 03/14/23 0801  •  gabapentin (NEURONTIN) capsule 100 mg, 100 mg, Oral, BID, Tamiko Rayo MD, 100 mg at 03/14/23 8781  •  glycerin-hypromellose- (ARTIFICIAL TEARS) ophthalmic solution 1 drop, 1 drop, Both Eyes, Q4H PRN, Fran Humphrey DO, 1 drop at 03/13/23 1432  •  hydroxychloroquine (PLAQUENIL) tablet 200 mg, 200 mg, Oral, Once per day on Mon Tue Wed Thu Fri, Carloz Beck, DO, 200 mg at 03/14/23 6973  •  hydroxychloroquine (PLAQUENIL) tablet 400 mg, 400 mg, Oral, Once per day on Sun Sat, Carloz Parnell, DO, 400 mg at 03/12/23 0791  •  lamoTRIgine (LaMICtal) tablet 150 mg, 150 mg, Oral, HS, Wannetta Salvage, DO, 150 mg at 03/13/23 2124  •  lidocaine (LIDODERM) 5 % patch 1 patch, 1 patch, Topical, Daily, Beau Childers MD, 1 patch at 03/14/23 0802  •  LORazepam (ATIVAN) tablet 0 25 mg, 0 25 mg, Oral, BID PRN, Lesli Sylvester MD, 0 25 mg at 03/11/23 1054  •  melatonin tablet 3 mg, 3 mg, Oral, HS, MATTI RutledgeNP, 3 mg at 03/13/23 2124  •  menthol-methyl salicylate (BENGAY) 50-64 % cream, , Apply externally, 4x Daily PRN, Beau Childers MD, Given at 03/06/23 1227  •  metoprolol succinate (TOPROL-XL) 24 hr tablet 25 mg, 25 mg, Oral, BID, Usman Messina CRNP, 25 mg at 03/13/23 1727  •  morphine (MSIR) IR tablet 7 5 mg, 7 5 mg, Oral, Q6H PRN, Lesli Sylvester MD, 7 5 mg at 03/14/23 1020  •  ondansetron (ZOFRAN-ODT) dispersible tablet 4 mg, 4 mg, Oral, Q6H PRN, Wannetta Salvage, DO, 4 mg at 03/13/23 1122  •  pantoprazole (PROTONIX) EC tablet 40 mg, 40 mg, Oral, Early Morning, Daniele Coughlin MD, 40 mg at 03/14/23 6956  •  polyethylene glycol (MIRALAX) packet 17 g, 17 g, Oral, Daily PRN, Wannetta Salvage, DO, 17 g at 03/04/23 1930  •  prazosin (MINIPRESS) capsule 1 mg, 1 mg, Oral, HS, Wannetta Salvage, DO, 1 mg at 03/13/23 2125  •  senna-docusate sodium (SENOKOT S) 8 6-50 mg per tablet 1 tablet, 1 tablet, Oral, BID, Wannetta Salvage, DO, 1 tablet at 03/14/23 0801  •  sodium chloride (OCEAN) 0 65 % nasal spray 1 spray, 1 spray, Each Nare, Q1H PRN, Kita Kathleen MD, 1 spray at 03/08/23 0300    Past Medical History:   Diagnosis Date   • Anxiety    • Asthma    • Bipolar depression ([facility] [address] )    • Chronic narcotic dependence ([facility] [address] )    • Chronic pain    • Depression    • Enterovirus heart infection    • SHAR (obstructive sleep apnea)    • Osteoarthritis    • Peripheral neuropathy    • Plantar fasciitis of right foot    • Senile osteoporosis    • Seronegative arthropathy of multiple sites [facility])    • Undifferentiated connective tissue disease ([facility] )        Patient Active Problem List    Diagnosis Date Noted   • [facility] ) 02/23/2023   • Hypokalemia 03/10/2023   • AMS (altered mental status) 03/09/2023   • Gout of foot 03/09/2023   • Dysphagia 03/09/2023   • Wound of left ankle 03/09/2023   • UTI (urinary tract infection) 03/06/2023   • Obstructive sleep apnea 02/28/2023   • History of stroke 02/28/2023   • History of bacteremia 02/24/2023   • Hypercalcemia 02/22/2023   • Discoloration of skin of foot 02/22/2023   • Abdominal pain 02/22/2023   • HFrEF (heart failure with reduced ejection fraction) ([facility] ) 02/13/2023   • Stroke ([facility] ) 02/12/2023   • HTN (hypertension) 02/12/2023   • Cardiomyopathy ([facility] ) 02/07/2023   • Vitamin D insufficiency 02/07/2023   • LAILA (acute kidney injury) ([facility] ) 04/23/2022   • Polypharmacy 04/23/2022   • 'light-for-dates' infant with signs of fetal malnutrition 04/22/2022   • Bipolar depression ([facility] ) 03/04/2022   • Rheumatoid arthritis ([facility] ) 03/04/2022   • Closed fracture of multiple ribs of right side 03/02/2022   • Traumatic pneumothorax 03/02/2022   • Closed fracture of transverse process of lumbar vertebra ([facility] ) 03/02/2022   • Liver contusion 03/02/2022   • Fall 03/02/2022   • Acute pain due to trauma 03/02/2022   • Right shoulder pain 03/02/2022   • Severe protein-calorie malnutrition ([address] ) 03/02/2022   • Undifferentiated connective tissue disease ([facility] ) 12/29/2021   • Primary generalized (osteo)arthritis 12/29/2021   • Peripheral neuropathy 12/29/2021   • Osteoporosis 12/29/2021   • Hx of compression fracture of spine 12/29/2021   • Lumbar spondylosis 12/29/2021   • Cervical spondylosis 12/29/2021   • Anemia 06/26/2021   • Chronic pain syndrome    • Asthma    • Ambulatory dysfunction    • Seronegative arthropathy of multiple sites (HCC)    • Gastroesophageal reflux disease without esophagitis    • Mixed stress and urge urinary incontinence    • CHF (congestive heart failure) ([facility] )         Dov Blood DO  Physical Medicine and Priyanka Rice    I have spent a total time of 55 minutes on 03/14/23 in caring for this patient including Instructions for management, Patient and family education, Documenting in the medical record, Reviewing / ordering tests, medicine, procedures  , Communicating with other healthcare professionals  and Interdisciplinary team conference completed today in the presence of physical therapy, Occupational Therapy, speech therapy, case management  We discussed her current functional status and medical status including her barriers and needs for discharge  We are weaning her one-to-one observation and she is making improvements please see above for further details

## 2023-03-14 NOTE — CASE MANAGEMENT
Team Update:  Cm met with pt and pt's daughter Moe Chaudhary at bedside  Cm reviewed team update and pt/family reviewing SNF choices and team working on weaning 1:1 and that will determine potential dc date  Pt's daughter had no further questions of concerns

## 2023-03-14 NOTE — PROGRESS NOTES
Internal Medicine Progress Note  Patient: Dolly Crane  Age/sex: 75 y o  female  Medical Record #: [id number]      ASSESSMENT/PLAN: (Interval History)  Dolly Crane is seen and examined and management for following issues:    Recent right MCA infarct W/petechial hemorrhage  • S/P thrombectomy by Dr Elaina Kawasaki 2/12  • Continue Eliquis 5 mg twice daily/statin   • Follow-up with neurosurgery/neurology as scheduled     NICM  • EF has been as low as 10 to 15% in the past  • In 2022 was 65%  • Now down to 30% = pt declined LifeVest  • Cardiology felt this was secondary to her recent CVA as well as possible medication noncompliance  • They recommend continuing beta-blocker and adding ARB when BP allows  • Will need outpatient ischemia work-up  • Followed by Dr Jimi Chauhan at [facility] however would like to follow with [facility] on dc  • Can add arb if bp improves     Seronegative rheumatoid arthritis  • A/T rheumatology takes Plaquenil 200 mg Monday through Friday 400 mg on Saturday and Sunday  • Continue same  • Follow-up on discharge     Hypercalcemia  · Seen by renal/endocrine on acute side  · Started on Sensipar and now down to 9 5  · Endocrine input appreciated  Will need outpt follow-up and potentially a surgical evaluation  Fall  · 3/6/23 onto left side  · Abrasion on left knee and Xray negative for fracture  · Could not verify whether or not she struck her head  · CT of head stable  · She also c/o L hip pain  X-ray negative for fx  · Fall precautions all in place       R great toe gout  · S/p prednisone  · Uric acid normal  · improved     Chronic pain  • Due to compression fractures of her thoracic spine/chronic rib fractures  • Lethargic 3/7 and MS contin changed to IR  • Pain management per PMR     Bipolar disorder  • Continue current medication regimen  • Follow-up with psychiatry on discharge  • Neuropsych following    Anxiety  · Cont current meds  · Moods are stable    Confusion  · Improved  · Decrease 1:1 to qhs only per PMR    Tachycardia  · May be 2/2 anxiety/MS contin dc'd  · Monitor for now  · stable     SHAR  • Does not use CPAP at home d/t claustraphobia  • OK use oxygen overnight     Dysuria/Urinary frequency/incontinence  • Urine culture +CRE  • s/p vantin 200mg bid through 3/9/23  • Symptoms improved    Hypokalemia  · Improved after replacement  · Repleted kcl 40 meq daily x 2  · K 3 8  · BMP Thursday         DC planning: 3/22/23=likely SNF       The above assessment and plan was reviewed and updated as determined by my evaluation of the patient on 3/14/2023      Labs:   Results from last 7 days   Lab Units 03/13/23  0612 03/07/23  1437   WBC Thousand/uL 7 25 9 35   HEMOGLOBIN g/dL 12 2 14 7   HEMATOCRIT % 38 8 46 1   PLATELETS Thousands/uL 172 264     Results from last 7 days   Lab Units 03/13/23  0612 03/11/23  0551   SODIUM mmol/L 138 145   POTASSIUM mmol/L 3 8 3 4*   CHLORIDE mmol/L 113* 111*   CO2 mmol/L 22 29   BUN mg/dL 13 11   CREATININE mg/dL 0 87 0 96   CALCIUM mg/dL 9 5 10 0             Results from last 7 days   Lab Units 03/07/23  1358   POC GLUCOSE mg/dl 127       Review of Scheduled Meds:  Current Facility-Administered Medications   Medication Dose Route Frequency Provider Last Rate   • acetaminophen  650 mg Oral Q6H PRN Sarahy Mariscal DO     • albuterol  2 puff Inhalation Q6H PRN Sarahy Mariscal DO     • aluminum-magnesium hydroxide-simethicone  30 mL Oral Q4H PRN Sarahy Mariscal DO     • apixaban  5 mg Oral BID Sarahy Mariscal DO     • atorvastatin  40 mg Oral QPM Sarahy Mariscal DO     • bisacodyl  10 mg Rectal Once Santiago Panda MD     • buPROPion  100 mg Oral BID Sarahy Mariscal DO     • cinacalcet  30 mg Oral Daily With Breakfast Anika Leal MD     • DULoxetine  60 mg Oral BID Sarahy Mariscal DO     • gabapentin  100 mg Oral BID Santiago Panda MD     • glycerin-hypromellose-  1 drop Both Eyes Q4H PRN Ivory Loida, DO • hydroxychloroquine  200 mg Oral Once per day on Mon Tue Wed Thu Fri Raynold Clunes, DO     • hydroxychloroquine  400 mg Oral Once per day on Sun Sat Raynold Clunes, DO     • lamoTRIgine  150 mg Oral HS Raynold Clunes, DO     • lidocaine  1 patch Topical Daily Kristen Mendes MD     • LORazepam  0 25 mg Oral BID PRN Roxana Alan MD     • melatonin  3 mg Oral HS LAURA Marlow     • menthol-methyl salicylate   Apply externally 4x Daily PRN Kristen Mnedes MD     • metoprolol succinate  25 mg Oral BID LAURA Keller     • morphine  7 5 mg Oral Q6H PRN Roxana Alan MD     • ondansetron  4 mg Oral Q6H PRN Raynold Clunes, DO     • pantoprazole  40 mg Oral Early Morning Clarisa Da Silva MD     • polyethylene glycol  17 g Oral Daily PRN Raynold Clunes, DO     • prazosin  1 mg Oral HS Raynold Clunes, DO     • senna-docusate sodium  1 tablet Oral BID Raynold Clunes, DO     • sodium chloride  1 spray Each Nare Q1H PRN Paula Rodriguez MD         Subjective/ HPI: Patient seen and examined  Patients overnight issues or events were reviewed with nursing or staff during rounds or morning huddle session  New or overnight issues include the following:     Pt seen in her room  No complaints overnight she does not want miralax daily  She'd like to switch that to as needed since her bowels are now on looser side  Otherwise she is happy about the decrease in narcotic use    ROS:   A 10 point ROS was performed; negative except as noted above  Imaging:     FL barium swallow video w speech   Final Result by [email or web address], DOCUMENTATION (03/09 1306)      FL barium swallow video w speech   Final Result by  (03/09 1550)      XR hip/pelv 2-3 vws left if performed   Final Result by Joahna Lemos MD (03/08 1633)      No acute osseous abnormality              Workstation performed: [id number]         XR shoulder 2+ vw left   Final Result by Johana Lemos MD (03/08 9050)      No acute osseous abnormality  Workstation performed: [id number]         XR spine cervical 2 or 3 vw injury   Final Result by Slade Gordon MD (03/08 1643)      No acute osseous abnormality or significant change from priors  Salt-and-pepper appearance of the skull is partially accentuated by the image processing /sharpening algorithm, though prior head CT suggests this is a true finding  Correlate for clinical/laboratory features of hyperparathyroidism  Workstation performed: [id number]         XR chest portable   Final Result by Rosamaria Ramirez MD (03/07 1650)      No acute cardiopulmonary disease  Workstation performed: [id number]         CT head wo contrast   Final Result by Andres Carroll MD (03/07 1358)      Evolving right MCA territory infarcts with associated petechial hemorrhage which is best seen on the recent MRI examination  There is no discrete parenchymal hematoma  Workstation performed: [id number]         XR knee 1 or 2 vw left   Final Result by Rosamaria Ramirez MD (03/07 1537)      No acute osseous abnormality  Workstation performed: [id number]             *Labs /Radiology studies reviewed  *Medications reviewed and reconciled as needed  *Please refer to order section for additional ordered labs studies  *Case discussed with primary attending during morning huddle case rounds    Physical Examination:  Vitals:   Vitals:    03/13/23 1727 03/13/23 1957 03/14/23 0500 03/14/23 0802   BP: 100/58 116/62 107/62 93/62   BP Location:  Right arm Right arm    Pulse: 97 90 101 (!) 108   Resp:  20 18    Temp:  97 6 °F (36 4 °C) (!) 97 3 °F (36 3 °C)    TempSrc:  Axillary Axillary    SpO2:  95% 100%    Weight:       Height:           GEN: No apparent distress, awake and interactive  NEURO: Alert and oriented x3; left neglect, cognitive issues as well as recall issues, mild dysarthria; Not confused today    HEENT: Pupils are equal and reactive, EOMI, mucous membranes are moist, face asymmetrical  CV: S1 S2 regular, no MRG, no peripheral edema noted  RESP: Lungs are clear bilaterally, no wheezes, rales or rhonchi noted, on room air, respirations easy and non labored  GI: Flat, soft non tender, non distended; +BS x4  : Voiding without difficulty, incontinent at times  MUSC: Moves all extremities; left hemiparesis; right great toe with improved erythema and less painful  SKIN: pink, warm and dry, poor turgor, b/l LE with dressings in place for both prevention/current wounds         The above physical exam was reviewed and updated as determined by my evaluation of the patient on 3/14/2023  Invasive Devices     None                    VTE Pharmacologic Prophylaxis: Eliquis  Code Status: Level 1 - Full Code  Current Length of Stay: 14 day(s)      Total time spent:  30 minutes  with more than 50% spent counseling/coordinating care  Counseling includes discussion with patient re: progress  and discussion with patient of his/her current medical state/information  Coordination of patient's care was performed in conjunction with primary service  Time invested included review of patient's labs, vitals, and management of their comorbidities with continued monitoring  In addition, this patient was discussed with medical team including physician and advanced extenders  The care of the patient was extensively discussed and appropriate treatment plan was formulated unique for this patient  Medical decision making for the day was made by supervising physician unless otherwise noted in their attestation statement  ** Please Note:  voice to text software may have been used in the creation of this document   Although proof errors in transcription or interpretation are a potential of such software**

## 2023-03-14 NOTE — TEAM CONFERENCE
Acute RehabilitationTe Conference Note  Date: 3/14/2023   Time: 10:38 AM       Patient Name:  Jonah Franks       Medical Record Number: [id number]   YOB: 1955  Sex: Female          Room/Bed:  [place] [address]/[facility] 453-01  Payor Info:  Payor: MEDICARE / Plan: MEDICARE A AND B / Product Type: Medicare A & B Fee for Service /      Admitting Diagnosis: Acute right MCA stroke ([facility] ) [I63 511]  CVA (cerebral vascular accident) ([place] [address] ) [I63 9]   Admit Date/Time:  2/28/2023  3:41 PM  Admission Comments: No comment available     Primary Diagnosis:  Acute CVA (cerebrovascular accident) ([place] [address] )  Principal Problem: Acute CVA (cerebrovascular accident) [facility])    Patient Active Problem List    Diagnosis Date Noted   • Hypokalemia 03/10/2023   • AMS (altered mental status) 03/09/2023   • Gout of foot 03/09/2023   • Dysphagia 03/09/2023   • Wound of left ankle 03/09/2023   • UTI (urinary tract infection) 03/06/2023   • Obstructive sleep apnea 02/28/2023   • History of stroke 02/28/2023   • History of bacteremia 02/24/2023   • Acute CVA (cerebrovascular accident) ([facility] ) 02/23/2023   • Hypercalcemia 02/22/2023   • Discoloration of skin of foot 02/22/2023   • Abdominal pain 02/22/2023   • HFrEF (heart failure with reduced ejection fraction) ([facility] ) 02/13/2023   • Stroke ([place] [address] ) 02/12/2023   • HTN (hypertension) 02/12/2023   • Cardiomyopathy ([facility] ) 02/07/2023   • Vitamin D insufficiency 02/07/2023   • LAILA (acute kidney injury) ([facility] ) 04/23/2022   • Polypharmacy 04/23/2022   • 'light-for-dates' infant with signs of fetal malnutrition 04/22/2022   • Bipolar depression ([facility] ) 03/04/2022   • Rheumatoid arthritis ([facility] ) 03/04/2022   • Closed fracture of multiple ribs of right side 03/02/2022   • Traumatic pneumothorax 03/02/2022   • Closed fracture of transverse process of lumbar vertebra ([facility] ) 03/02/2022   • Liver contusion 03/02/2022   • Fall 03/02/2022   • Acute pain due to trauma 03/02/2022   • Right shoulder pain 03/02/2022   • Severe protein-calorie malnutrition ([facility] ) 03/02/2022   • Undifferentiated connective tissue disease ([facility] ) 12/29/2021   • Primary generalized (osteo)arthritis 12/29/2021   • Peripheral neuropathy 12/29/2021   • Osteoporosis 12/29/2021   • Hx of compression fracture of spine 12/29/2021   • Lumbar spondylosis 12/29/2021   • Cervical spondylosis 12/29/2021   • Anemia 06/26/2021   • Chronic pain syndrome    • Asthma    • Ambulatory dysfunction    • Seronegative arthropathy of multiple sites (HCC)    • Gastroesophageal reflux disease without esophagitis    • Mixed stress and urge urinary incontinence    • CHF (congestive heart failure) ([facility] )        Physical Therapy:    Weight Bearing Status: Full Weight Bearing  Transfers: Minimal Assistance  Bed Mobility: Minimal Assistance  Amulation Distance (ft): 100 feet (rolling walker)  Ambulation: Minimal Assistance  Assistive Device for Ambulation: Roller Walker  Wheelchair Mobility Distance: 100 ft  Wheelchair Mobility: Supervision  Number of Stairs: 6  Assistive Device for Stairs: Bilateral Hand Rails  Stair Assistance: Moderate Assistance  Discharge Recommendations: [place] Doss    3/13/23 Pt is demonstrating slow function gains however continues to demonstrate impulsivity, decreased attention, decreased cognition and decreased safety awareness along with impaired balance, decreased strength, and decreased endurance  Chronic back pain also limits patient's tolerance to activity tolerance  Pt trialed ambulation with rolling walker and was able to ambulate 50 ft at a time with MIN A  Pt requires MOD VC's with ambulation with walker and sit to stand with hand placement with walker  Pt utilizes bilateral hand rails for 6 steps with MOD A and requires VC for foot placement and safety  Pt performs 200 ft with WC mobility with supervision and MIN VC for safety awareness   Pt will continue POC for skilled PT to focus on safety awareness, gait, balance, endurance, and stair negotiation  Plan this week to wean off 1:1 and facilitate a safe SNF transition to continue rehab  Co-signed by: Piotr Quiroz DPT      Occupational Therapy:  Eating: Supervision  Grooming: Minimal Assistance  Bathing: Moderate Assistance  Bathing: Moderate Assistance  Upper Body Dressing: Moderate Assistance  Lower Body Dressing: Minimal Assistance  Toileting: Moderate Assistance  Toilet Transfer: Moderate Assistance  Cognition: Exceptions to WNL  Cognition: Decreased Memory, Impulsive, Decreased Executive Functions, Decreased Attention, Decreased Comprehension, Decreased Safety  Orientation: Person, Place, Time, Situation  Discharge Recommendations: Other ([facility])  DC Home with[de-identified] 24 Hour Supervision, 24 Hour Assistance       Pt continues to present with impairments in activity tolerance, endurance, standing balance/tolerance, sitting balance/tolerance, UE strength, FMC, GMC, arousal, memory, insight, safety , judgement , attention , sequencing , visual perceptual skills , depth perception , and (L) attention   Additional functional barriers include fatigue, pain, impulsivity, (L) hemiparesis, (L) visual deficits , decreased caregiver support, risk for falls, and home environment  Pt is functioning at overall Mod-Max A for ADLs and Mod Ax1 HHA for fxnl mobility  Pt will continue to benefit from skilled OT services to address above mentioned barriers and maximize functional independence in baseline areas of occupation   OT D/C recommendation is for reteam, highly anticipate need for 24 hour S/A at time of d/c               Speech Therapy:  Mode of Communication: Verbal  Speech/Language: Dysarthia  Cognition: Exceptions to WNL  Cognition: Decreased Memory, Decreased Executive Functions, Decreased Attention, Decreased Comprehension, Decreased Safety, Impulsive  Orientation: Person, Place, Situation (fluctuates with times of increased confusion and hallucinations)  Swallowing: Exceptions to WNL  Swallowing: Oral Dysphagia, Pharyngeal Dysphagia, Esophageal Dysphagia, Aspiration Risk, Reflux Precautions  Diet Recommendations: Level 1/Puree, [place] Thick  Discharge Recommendations: Home with:  [address] with[de-identified] 24 Hour Supervision, 24 Hour Assisteance, Family Support, Home Speech Therapy, Outpatient Speech Therapy  Pt currently being followed for speech, cognitive and dysphagia tx sessions  In regards to dysphagia, pt is demonstrating moderate oropharyngeal deficits to where current diet recommendations are for puree/thin liquids by cup only  Symptoms or concerns included decreased bolus acceptance, decreased bolus propulsion, decreased mastication, decreased bolus formation, suspected decreased control of soft solids and thin   and unintentional oral holding w/ soft solids  suspected pharyngeal swallow delay, suspected decreased hyolaryngeal elevation upon palpation, suspected pharyngeal residue, multiple swallows, audible swallows and throat clearing w/ solids, coughing w/ sips of thin and soft solids  Pt has been requesting liquids by straw, but due to decreased bolus control/timing of swallow initation and the increased aspiration sxs which are elicited given straw sips, still recommendations remain for no straws currently  It is likely that a repeat VFSS to determine current swallow function and least restrictive diet  As for speech, pt able to complete Motor Speech Eval, in which pt is demonstrating speech intelligibility to be moderately impaired at the word/phrase/sentence/conversational level  Pt noting to elicit the following errors: imprecise articulation, decreased breath support for speech, fast rate which impacts overall clarity at the word, phrase, sentence and conversational speech level  Pt w/ inconsistent awareness given this throughout assessment today   Additionally, pt has been working towards completing the CLQT+ as well targeting cognitive linguistic skills, to where pt is noted to exhibit deficits given attention, visual attention (L inattention), decreased ST/working memory, decreased executive functions (problem solving, reasoning, sequencing, organization of thoughts), decreased judgement and insight to overall deficits impacting safety and functional mobility  Currently pt will continue to benefit from ongoing skilled SLP services to maximize overall speech, cognitive and swallow function in establishing least restrictive diet in attempts to decrease caregiver burden at time of anticipated discharge home w/ family support/supervision  Update week of 3/13/2023: Pt continues to be followed for skilled SLP therapy targeting cognitive linguistic skills, speech skills and dysphagia  Regarding cognitive linguistic and speech skills, pt further presents with overall deficits, noting skills to frequently fluctuate  Pt remains with deficits and barriers in the following areas: attention, ST/working memory, processing, comprehension, expression, basic problem solving, executive functions, insight into deficits, organization of thoughts, sequencing, visualspatial skills/visual attention (L inattention) and impulsivity  Due to the above deficits, pt's overall functional independence and safety are impacted at this time  Pt also presents with fluctuating RHIANNON and times of increased lethargy, along with occasional hallucinations and confabulations, which impact independent functioning and safety  Regarding speech skills, pt demonstrates times of improvement but does continue to present with overall moderately dysarthric speech characterized by decreased breath support for speech, fast rate of speech, low volume and imprecise articulation of sounds, which impacts speech intelligibility at all levels of speech  Currently when fully awake and with increased alertness, pt is functioning at min assist for comprehension and mod assist for expression, problem solving and memory   Additionally, pt continues to present with oropharyngeal dysphagia and was recommended for completion of a VFSS to further assess swallow function as pt presenting with inconsistent overt s/s aspiration  VFSS completed on 3/9/2023 where pt demonstrating moderate to severe oropharyngeal dysphagia characterized by weak lip seal, anterior loss, decreased control of boluses, slow bolus manipulation, prolonged mastication, reduced bolus formation, delayed transfers, weak BOT retraction, reduced hyolaryngeal excursion, delayed swallow initiation, weak pharyngeal constriction which resulted in pharyngeal retention, and reduced airway protection  Pt exhibited laryngeal penetration across liquids, along with silent aspiration events with both thin liquids, as well as nectar thick taken by straw sips  Pt was then recommended to continue puree diet with change to nectar thick liquids by cup only  As pt demo limited independent carryover of recommended safe swallow strategies, pt is recommended to continue to have full supervision during meals to ensure carryover of these strategies  Pt's family was educated on results of VFSS, along with current diet recommendations  At this time, pt is recommended for further skilled SLP services to maximize overall speech, cognitive and swallow function in establishing least restrictive diet in attempts to decrease caregiver burden at time of anticipated discharge home with family support/supervision        Nursing Notes:  Appetite: Fair  Diet Type: Dysphagia I, Nectar thick liquids                      Diet Patient/Family Education Complete: No (ongoing)                            Bladder: Incontinent     Bladder Patient/Family Education: Yes  Bowel: Continent     Bowel Patient/Family Education: Yes  Pain Location/Orientation: Orientation: Lower, Location: Back, Orientation: Bilateral  Pain Score: 9  Pain 2  Pain Score 2: 2 (LBP)                    Hospital Pain Intervention(s): Medication (See MAR)  Pain Patient/Family Education: Yes  Medication Management/Safety  Safe Administration: No  Reason for non-safe administration: will need med assist at d/c  Medication Patient/Family Education Complete: No (ongoing)    Pt admitted w decline in LOC from [facility] s/p Recent right MCA infarct W/petechial hemorrhage S/P thrombectomy by Dr Karla Orantes 2/12 Continue Eliquis 5 mg twice daily/statin Follow-up with neurosurgery/neurology as scheduled NICM- EF has been as low as 10 to 15% in the past In 2022 was 65% Now down to 30% = pt declined [address] [facility] felt this was secondary to her recent CVA as well as possible medication noncompliance They recommend continuing beta-blocker and adding ARB when BP allows  Will need outpatient ischemia work-up Have not been able to add back ARB yet 2/2 soft BPs and cant go down on Toprol dosing since HRs go into the low 100s at times Seronegative rheumatoid arthritis A/T rheumatology takes Plaquenil 200 mg Monday through Friday 400 mg on Saturday and Sunday Continue same Follow-up on discharge  Chronic pain- Due to compression fractures of her thoracic spine/chronic rib fractures Takes MS Contin 15 mg every 12 hours Has Oxycodone 5 mg immediate release for breakthrough pain at home  According to the patient she takes only 1 dose of breakthrough oxycodone per day  Here using tylenol for breakthrough  Bipolar disorder Continue current medication regimen SHAR- Refusing CPAP here  OK use oxygen overnight Tachycardia/low O2 on pulse ox- Some irregularity on exam in past but today RRR  EKG with PVCs  During the week, O2 sats improved when hands were warmed up  O2 sats not likely to be accurate due to circulation issues  Dysuria/Urinary frequency/incontinence- Urine culture +CRE PMR started IV Cipro 400mg every 12 hours for 5 days  Switched to po  Pt w fall 3/7- found on floor  1:1 watch ordered  Pt  incontinent of bowel and bladder at times  This week we will encourage independence with ADLs    We will monitor labs and vital signs  We will educate pt  about repositioning to prevent skin breakdown  We will perform routine skin checks  We will monitor for constipation and medicate as ordered  We will monitor for adequate pain control  We will increase safety awareness with transfers and keep pt free from falls  Case Management:     Discharge Planning  Living Arrangements: Lives w/ Children, Lives w/ Family members  Support Systems: Self  Assistance Needed: walker  Type of Current Residence: Private residence  Current Home Care Services: No  3/14- Pt possible dc to SNF, pt currently on 1:1, cm sent SNF list to dtquintin Maravilla to review  Is the patient actively participating in therapies? yes  List any modifications to the treatment plan: None    Barriers Interventions   Polypharmacy Med adjustment   Chronic back Pain Med management   Sleep apnea Oxygen  at night   Hypertension Med Management   Gout on right big toe Med Mangagement   1:1 7p-7a   Impaired cog SLP Services, improving   Gait dysfunction TE   Impaired balance Strengthening   Left side inattention Compensatory strategies, using adaptive equipment   Dysarthria  SLP Services   Poor standing balance Strengthening   Oral farengal dysphagia SLP Services         Is the patient making expected progress toward goals?  yes  List any update or changes to goals: None    Medical Goals: Patient will be medically stable for discharge to [facility] upon completion of rehab program and Patient will be able to manage medical conditions and comorbid conditions with medications and follow up upon completion of rehab program    Weekly Team Goals:   Rehab Team Goals  ADL Team Goal: Patient will require supervision with ADLs with least restrictive device upon completion of rehab program  Transfer Team Goal: Patient will require supervision with transfers with least restrictive device upon completion of rehab program (S)  Locomotion Team Goal: Patient will require supervision with locomotion with least restrictive device upon completion of rehab program (S)  Cognitive Team Goal: Patient will require supervision for basic and complex tasks upon completion of rehab program    Discussion: Pt participating in therapies and rehab program  Pt on pureed and nectar thick diet, pt functioning at min assist of 1 for household distances with walker, mod assist for ADLs  Family training/meeting occurred, family would like pt to go to SNF and know to have first floor set up  Team recommending 24/7 supervision for pt after SNF and family is aware  Team recommending SNF transition, pt trialing weaning 1:1 and will send referrals  Anticipated Discharge Date:  reteam [facility] Team Members Present: The following team members are supervising care for this patient and were present during this Weekly Team Conference      Physician: Dr Joe Velez DO  : MING Bartholomew  Registered Nurse: Rich Dacosta RN  Physical Therapist: ERIK ConcepcionT  Occupational Therapist: Tan [address], OTR/L, CBIS  Speech Therapist: Eileen Gutierrez, [address], [facility]

## 2023-03-14 NOTE — PLAN OF CARE
Problem: PAIN - ADULT  Goal: Verbalizes/displays adequate comfort level or baseline comfort level  Description: Interventions:  - Encourage patient to monitor pain and request assistance  - Assess pain using appropriate pain scale  - Administer analgesics based on type and severity of pain and evaluate response  - Implement non-pharmacological measures as appropriate and evaluate response  - Consider cultural and social influences on pain and pain management  - Notify physician/advanced practitioner if interventions unsuccessful or patient reports new pain  Outcome: Progressing     Problem: INFECTION - ADULT  Goal: Absence or prevention of progression during hospitalization  Description: INTERVENTIONS:  - Assess and monitor for signs and symptoms of infection  - Monitor lab/diagnostic results  - Monitor all insertion sites, i e  indwelling lines, tubes, and drains  - Monitor endotracheal if appropriate and nasal secretions for changes in amount and color  - [place] appropriate cooling/warming therapies per order  - Administer medications as ordered  - Instruct and encourage patient and family to use good hand hygiene technique  - Identify and instruct in appropriate isolation precautions for identified infection/condition  Outcome: Progressing  Goal: Absence of fever/infection during neutropenic period  Description: INTERVENTIONS:  - Monitor WBC    Outcome: Progressing     Problem: SAFETY ADULT  Goal: Patient will remain free of falls  Description: INTERVENTIONS:  - Educate patient/family on patient safety including physical limitations  - Instruct patient to call for assistance with activity   - Consult OT/PT to assist with strengthening/mobility   - Keep Call bell within reach  - Keep bed low and locked with side rails adjusted as appropriate  - Keep care items and personal belongings within reach  - Initiate and maintain comfort rounds  - Make Fall Risk Sign visible to staff  - Offer Toileting every 2 Hours, in advance of need  - Initiate/Maintain bed  chair alarm  - Obtain necessary fall risk management equipment: nonskid socks  - Apply yellow socks and bracelet for high fall risk patients  - Consider moving patient to room near nurses station  Outcome: Progressing  Goal: Maintain or return to baseline ADL function  Description: INTERVENTIONS:  -  Assess patient's ability to carry out ADLs; assess patient's baseline for ADL function and identify physical deficits which impact ability to perform ADLs (bathing, care of mouth/teeth, toileting, grooming, dressing, etc )  - Assess/evaluate cause of self-care deficits   - Assess range of motion  - Assess patient's mobility; develop plan if impaired  - Assess patient's need for assistive devices and provide as appropriate  - Encourage maximum independence but intervene and supervise when necessary  - Involve family in performance of ADLs  - Assess for home care needs following discharge   - Consider OT consult to assist with ADL evaluation and planning for discharge  - Provide patient education as appropriate  Outcome: Progressing  Goal: Maintains/Returns to pre admission functional level  Description: INTERVENTIONS:  - Perform BMAT or MOVE assessment daily    - Set and communicate daily mobility goal to care team and patient/family/caregiver  - Collaborate with rehabilitation services on mobility goals if consulted  - Perform Range of Motion 3 times a day  - Reposition patient every 2 hours    - Dangle patient 3 times a day  - Stand patient 3 times a day  - Ambulate patient 3 times a day  - Out of bed to chair 3 times a day   - Out of bed for meals 3 times a day  - Out of bed for toileting  - Record patient progress and toleration of activity level   Outcome: Progressing     Problem: DISCHARGE PLANNING  Goal: Discharge to home or other facility with appropriate resources  Description: INTERVENTIONS:  - Identify barriers to discharge w/patient and caregiver  - Arrange for needed discharge resources and transportation as appropriate  - Identify discharge learning needs (meds, wound care, etc )  - Arrange for interpretive services to assist at discharge as needed  - Refer to Case Management Department for coordinating discharge planning if the patient needs post-hospital services based on physician/advanced practitioner order or complex needs related to functional status, cognitive ability, or social support system  Outcome: Progressing     Problem: Nutrition/Hydration-ADULT  Goal: Nutrient/Hydration intake appropriate for improving, restoring or maintaining nutritional needs  Description: Monitor and assess patient's nutrition/hydration status for malnutrition  Collaborate with interdisciplinary team and initiate plan and interventions as ordered  Monitor patient's weight and dietary intake as ordered or per policy  Utilize nutrition screening tool and intervene as necessary  Determine patient's food preferences and provide high-protein, high-caloric foods as appropriate       INTERVENTIONS:  - Monitor oral intake, urinary output, labs, and treatment plans  - Assess nutrition and hydration status and recommend course of action  - Evaluate amount of meals eaten  - Assist patient with eating if necessary   - Allow adequate time for meals  - Recommend/ encourage appropriate diets, oral nutritional supplements, and vitamin/mineral supplements  - Order, calculate, and assess calorie counts as needed  - Recommend, monitor, and adjust tube feedings and TPN/PPN based on assessed needs  - Assess need for intravenous fluids  - Provide specific nutrition/hydration education as appropriate  - Include patient/family/caregiver in decisions related to nutrition  Outcome: Progressing     Problem: Prexisting or High Potential for Compromised Skin Integrity  Goal: Skin integrity is maintained or improved  Description: INTERVENTIONS:  - Identify patients at risk for skin breakdown  - Assess and monitor skin integrity  - Assess and monitor nutrition and hydration status  - Monitor labs   - Assess for incontinence   - Turn and reposition patient  - Assist with mobility/ambulation  - Relieve pressure over bony prominences  - Avoid friction and shearing  - Provide appropriate hygiene as needed including keeping skin clean and dry  - Evaluate need for skin moisturizer/barrier cream  - Collaborate with interdisciplinary team   - Patient/family teaching  - Consider wound care consult   Outcome: Progressing     Problem: MOBILITY - ADULT  Goal: Maintain or return to baseline ADL function  Description: INTERVENTIONS:  -  Assess patient's ability to carry out ADLs; assess patient's baseline for ADL function and identify physical deficits which impact ability to perform ADLs (bathing, care of mouth/teeth, toileting, grooming, dressing, etc )  - Assess/evaluate cause of self-care deficits   - Assess range of motion  - Assess patient's mobility; develop plan if impaired  - Assess patient's need for assistive devices and provide as appropriate  - Encourage maximum independence but intervene and supervise when necessary  - Involve family in performance of ADLs  - Assess for home care needs following discharge   - Consider OT consult to assist with ADL evaluation and planning for discharge  - Provide patient education as appropriate  Outcome: Progressing  Goal: Maintains/Returns to pre admission functional level  Description: INTERVENTIONS:  - Perform BMAT or MOVE assessment daily    - Set and communicate daily mobility goal to care team and patient/family/caregiver  - Collaborate with rehabilitation services on mobility goals if consulted  - Perform Range of Motion 3 times a day  - Reposition patient every 2 hours    - Dangle patient 3 times a day  - Stand patient 3 times a day  - Ambulate patient 3 times a day  - Out of bed to chair 3 times a day   - Out of bed for meals 3 times a day  - Out of bed for toileting  - Record patient progress and toleration of activity level   Outcome: Progressing

## 2023-03-14 NOTE — PROGRESS NOTES
"   03/14/23 1300   Pain Assessment   Pain Assessment Tool 0-10   Pain Score 8   Pain Location/Orientation Orientation: Lower; Location: Back   Restrictions/Precautions   Precautions Aspiration;Bed/chair alarms;Cognitive; Fall Risk;Contact/isolation;Pain;Supervision on toilet/commode;Visual deficit   Weight Bearing Restrictions No   ROM Restrictions No   Lifestyle   Autonomy \"I think I was showing off when my daughter was here, my back really hurts  \"   Sit to Stand   Type of Assistance Needed Physical assistance;Verbal cues; Ke Crawford / Luci Nassarsy; Adaptive equipment   Physical Assistance Level 25% or less   Comment vc's for proper hand placement and to pace task  Without pacing, pt retropulsive and req correction  Sit to Stand CARE Score 3   Bed-Chair Transfer   Type of Assistance Needed Physical assistance;Verbal cues; Ke Crawford / Luci Nassarsy; Adaptive equipment   Physical Assistance Level 26%-50%   Comment straight path Min A w/ RW, Mod A w/ turn-taking  vc's for sequencing and RW management  ambulated to bathroom  Chair/Bed-to-Chair Transfer CARE Score 3   Toileting Hygiene   Type of Assistance Needed Physical assistance; Adaptive equipment;Verbal cues   Physical Assistance Level 51%-75%   Comment noted w/ bowel incontinence ad RN made aware  able to partially manage hygiene, req A for rear thoroughness  Mod A to steady in stance for clothing management w/ pt able to partially management over R hip  multimodal cues to maximize clothing management over L hip however req A to fully management  Toileting Hygiene CARE Score 2   Toilet Transfer   Type of Assistance Needed Physical assistance;Verbal cues; Ke Laguerre; Adaptive equipment   Physical Assistance Level 26%-50%   Comment Min-Mod A w/ RW to BS over toilet, Mod vc's for sequencing and RW management  Toilet Transfer CARE Score 3   ROM - Left Upper Extremities    L Shoulder AROM; Flexion; Extension;Horizontal ABduction;ABduction  (shoulder flexion/ABD no higher than " 90*)   L Position Seated   L Weight/Reps/Sets 4x10   LUE ROM Comment Fxnl AROM and reach to moving target on pt's L, facilitating extension pattern  Tolerated well w/ rest breaks between sets  Neuromuscular Education   Response to Weight Bearing Technique While in stance at table focused on R fxnl reach crossing midline to retrieve cones on pt's L to maximize LUE weightbearing, OTR stabilizing at elbow PRN, completed 3x6  Add'lly focused on L fxnl reach and sustaining fxnl cylindrical grasp/release  2x6  Coordination   Gross Motor Focused on raking grasp pattern to retrieve wooden pegs from table, req Min-Mod vc's to visually track to improve coordination, improvement noted w/ task progression  x18 reps req inc time  Fine Motor Using red hand sponge completed 3x10 L hand squeezes and thumb flexion/extension  Addd'lly engaged in b/l integration moving hand sponge between each hand to maximize L hand to target and visual tracking of LUE to maximize NMR  Cognition   Overall Cognitive Status Impaired   Assessment   Treatment Assessment Pt seen for skilled OT session focusing on short distance fxnl mobility w/ RW, toileting, LUE NMR, and repetitive safety training  Pt limited by lower back pain and fatigue, req frequent rest breaks to manage  Pt cont to be limited by L-inattention, L visual impairment, and cognitive deficits req vc's for sequencing and safety for fxnl tasks  Pt's mentation improved compared to last week, cont to reiterate importance of utilizing call bell as 1:1 during the day has been weaned  Cont OT POC: ADL retraining, fxnl mobility w/ RW during therapy times only w/ RW to be kept in therapy gym per primary PT, fxnl standing tolerance, repetitive safety training, toileting, LUE NMR, and L visual scanning  Pt requested to rest in bed, all needs within reach and alarm activated  Prognosis Fair   Problem List Decreased strength;Decreased endurance; Impaired balance;Decreased mobility; Decreased coordination;Decreased cognition; Impaired judgement;Decreased safety awareness;Pain   Recommendation   OT Discharge Recommendation Post acute rehabilitation services   OT Therapy Minutes   OT Time In 1300   OT Time Out 1430   OT Total Time (minutes) 90   OT Mode of treatment - Individual (minutes) 90   OT Mode of treatment - Concurrent (minutes) 0   OT Mode of treatment - Group (minutes) 0   OT Mode of treatment - Co-treat (minutes) 0   OT Mode of Treatment - Total time(minutes) 90 minutes   OT Cumulative Minutes 1185   Therapy Time missed   Time missed?  No

## 2023-03-15 RX ADMIN — APIXABAN 5 MG: 5 TABLET, FILM COATED ORAL at 08:41

## 2023-03-15 RX ADMIN — ONDANSETRON 4 MG: 4 TABLET, ORALLY DISINTEGRATING ORAL at 18:34

## 2023-03-15 RX ADMIN — APIXABAN 5 MG: 5 TABLET, FILM COATED ORAL at 18:05

## 2023-03-15 RX ADMIN — MORPHINE SULFATE 7.5 MG: 15 TABLET ORAL at 08:44

## 2023-03-15 RX ADMIN — LIDOCAINE 5% 1 PATCH: 700 PATCH TOPICAL at 08:40

## 2023-03-15 RX ADMIN — DULOXETINE HYDROCHLORIDE 60 MG: 60 CAPSULE, DELAYED RELEASE ORAL at 21:09

## 2023-03-15 RX ADMIN — ONDANSETRON 4 MG: 4 TABLET, ORALLY DISINTEGRATING ORAL at 09:06

## 2023-03-15 RX ADMIN — ATORVASTATIN CALCIUM 40 MG: 40 TABLET, FILM COATED ORAL at 18:05

## 2023-03-15 RX ADMIN — LAMOTRIGINE 150 MG: 100 TABLET ORAL at 21:09

## 2023-03-15 RX ADMIN — GABAPENTIN 100 MG: 100 CAPSULE ORAL at 18:05

## 2023-03-15 RX ADMIN — METOPROLOL SUCCINATE 25 MG: 25 TABLET, EXTENDED RELEASE ORAL at 18:05

## 2023-03-15 RX ADMIN — BUPROPION HYDROCHLORIDE TABLETS 100 MG: 100 TABLET, FILM COATED ORAL at 08:42

## 2023-03-15 RX ADMIN — ALUMINUM HYDROXIDE, MAGNESIUM HYDROXIDE, AND DIMETHICONE 30 ML: 200; 20; 200 SUSPENSION ORAL at 10:17

## 2023-03-15 RX ADMIN — MELATONIN 3 MG: at 21:09

## 2023-03-15 RX ADMIN — CINACALCET 30 MG: 30 TABLET, FILM COATED ORAL at 06:34

## 2023-03-15 RX ADMIN — BUPROPION HYDROCHLORIDE TABLETS 100 MG: 100 TABLET, FILM COATED ORAL at 18:07

## 2023-03-15 RX ADMIN — GABAPENTIN 100 MG: 100 CAPSULE ORAL at 08:41

## 2023-03-15 RX ADMIN — DULOXETINE HYDROCHLORIDE 60 MG: 60 CAPSULE, DELAYED RELEASE ORAL at 08:41

## 2023-03-15 RX ADMIN — PRAZOSIN HYDROCHLORIDE 1 MG: 1 CAPSULE ORAL at 21:10

## 2023-03-15 RX ADMIN — PANTOPRAZOLE SODIUM 40 MG: 40 TABLET, DELAYED RELEASE ORAL at 05:36

## 2023-03-15 RX ADMIN — SENNOSIDES AND DOCUSATE SODIUM 1 TABLET: 8.6; 5 TABLET ORAL at 08:41

## 2023-03-15 RX ADMIN — HYDROXYCHLOROQUINE SULFATE 200 MG: 200 TABLET ORAL at 06:34

## 2023-03-15 NOTE — TELEPHONE ENCOUNTER
Pt brother Alvarado Maxwell has called again and is asking for Dr Grover Ceja to call him back  when he gets a chance to discuss some things he forgot to mention to Nicole 1924 yesterday      # [phone]

## 2023-03-15 NOTE — PROGRESS NOTES
03/15/23 0700   Pain Assessment   Pain Assessment Tool 0-10   Pain Score 5   Pain Location/Orientation Location: Abdomen   Restrictions/Precautions   Precautions Aspiration;Bed/chair alarms;Cognitive; Fall Risk;Supervision on toilet/commode   Weight Bearing Restrictions No   ROM Restrictions No   Oral Hygiene   Type of Assistance Needed Supervision;Verbal cues   Physical Assistance Level No physical assistance   Comment Pt does require set up A of toothbrush/paste and then is able to complete remainder of task while seated in w/c at sink  Oral Hygiene CARE Score 4   Shower/Bathe Self   Type of Assistance Needed Physical assistance   Physical Assistance Level 26%-50%   Comment Pt continues to be limited by significant L body inattention which greatly limits her independence with bathing tasks  She requires max multimodal cues to bathe L body and cues to avoid all compensatory techniques  A while in stance to maintain balance and A for thoroughness of rear/santiago hygiene  Shower/Bathe Self CARE Score 3   Bathing   Assessed Bath Style Shower   Able to Rd Gerardo No   Able to [employer] Temperature No   Able to Wash/Rinse/Dry (body part) Right Arm;L Upper Leg;R Upper Leg;L Lower Leg/Foot;R Lower Leg/Foot;Chest;Abdomen;Perineal Area;Left Arm   Limitations Noted in Balance; Endurance;Strength;Timeliness; Safety   Positioning Seated;Standing   Adaptive Equipment Hand Held Shower; Tub Bench; Shower Bars   Tub/Shower Transfer   Limitations Noted In Balance; Endurance; Safety   Assessed Shower   Findings Pt overall Marika w BUE support on GB  Upper Body Dressing   Type of Assistance Needed Physical assistance;Verbal cues   Physical Assistance Level 26%-50%   Comment max cues for orientation of shirt and for L body attention and to manage OH bra down over trunk     Upper Body Dressing CARE Score 3   Lower Body Dressing   Type of Assistance Needed Physical assistance   Physical Assistance Level 51%-75%   Comment Pt able to recall hernandez-dressing technique  while seated in w/c A to fully thread LLE, able to thread RLE  Min A to steady in stance w/ pt able to manage over R hip, multimodal cues to manage in back and over L hip, req A to fully manage  Lower Body Dressing CARE Score 2   Putting On/Taking Off Footwear   Type of Assistance Needed Physical assistance   Physical Assistance Level 26%-50%   Comment A to initiate threading over toes and then can complete remainder  Putting On/Taking Off Footwear CARE Score 3   Lying to Sitting on Side of Bed   Type of Assistance Needed Supervision; Adaptive equipment;Verbal cues   Physical Assistance Level No physical assistance   Comment Inc time and use of bed rails  Cues for L body attention  Lying to Sitting on Side of Bed CARE Score 4   Sit to Stand   Type of Assistance Needed Physical assistance   Physical Assistance Level 25% or less   Sit to Stand CARE Score 3   Bed-Chair Transfer   Type of Assistance Needed Physical assistance   Physical Assistance Level 25% or less   Comment HHA on R  Chair/Bed-to-Chair Transfer CARE Score 3   Cognition   Overall Cognitive Status Impaired   Arousal/Participation Alert; Cooperative   Attention Attends with cues to redirect   Orientation Level Oriented to person;Oriented to place   Memory Decreased short term memory   Following Commands Follows one step commands without difficulty   Activity Tolerance   Activity Tolerance Patient tolerated treatment well   Assessment   Treatment Assessment Pt participated in skilled OT services with focus on ADL retraining and functional txfers  Pt continues to be limited by significant L body inattention, dec standing balance/tolerance, dec functional cognition including sequencing, attention, problem solving and memory  Pt continues to require max cueing for L body attention during all functional tasks   Pt will continue to benefit from skilled OT services with focus on above mentioned deficits to inc safety and independence with I/ADL tasks and reduce burden of care  Prognosis Fair   Problem List Decreased strength;Decreased endurance; Impaired balance;Decreased mobility; Decreased coordination;Decreased cognition; Impaired judgement;Decreased safety awareness;Pain   Plan   Treatment/Interventions ADL retraining;Functional transfer training; Therapeutic exercise; Endurance training;Cognitive reorientation;Patient/family training;Equipment eval/education; Bed mobility; Compensatory technique education   Progress Progressing toward goals   Recommendation   OT Discharge Recommendation Post acute rehabilitation services   OT Therapy Minutes   OT Time In 0700   OT Time Out 0830   OT Total Time (minutes) 90   OT Mode of treatment - Individual (minutes) 90   OT Mode of treatment - Concurrent (minutes) 0   OT Mode of treatment - Group (minutes) 0   OT Mode of treatment - Co-treat (minutes) 0   OT Mode of Treatment - Total time(minutes) 90 minutes   OT Cumulative Minutes 1275   Therapy Time missed   Time missed?  No

## 2023-03-15 NOTE — PROGRESS NOTES
PM&R PROGRESS NOTE:  Prince Lockhart 75 y o  female MRN: [id number]  Unit/Bed#: -01 Encounter: [id number]      Rehabilitation Diagnosis: Impairment of mobility, safety, Activities of Daily Living (ADLs), and cognitive/communication skills due to Stroke:  01 1  Left Body Involvement (Right Brain)    HPI: Prince Lockhart is a 75 y o  female with history of asthma, anxiety, bipolar depression, chronic narcotic use, previous enteroviral infection of the heart, seronegative arthropathy of multiple joints, undifferentiated connective tissue disorder, obstructive sleep apnea on home CPAP who presented to the [facility] on 2/22 for altered mental status  According to the patient's daughter on chart review, the patient's mental status had been declining for the prior 48 hours to admission  She did receive Narcan which improved her mental status slightly  Of note she had a recent stroke with revascularization on 2/12/2023 with Dr Maggie Villarreal  She was seen by neurology with overall good strength except mildly weak in the lower extremities with brisk reflexes on the left and a positive Haroon's  At this time is felt to be potentially related to polypharmacy in the setting of chronic pain meds versus seizure in the setting of a recent stroke  She was initiated on 81 mg of aspirin daily with a plan of potentially transitioning to Eliquis, plan for EEG  CT of the head was negative  MRI was completed showing a new small acute/recent interval lacunar infarction in the right cerebral pedun there is jacinta  She was started on Eliquis  Nephrology was consulted as well for hypercalcemia  The patient was evaluated by the Rehabilitation team and deemed an appropriate candidate for comprehensive inpatient rehabilitation and admitted to the [facility] on 2/28/2023  3:41 PM patient was seen in room with brother at bedside and all questions regarding the acute inpatient rehabilitation program were answered  We also discussed at length course in the hospital and her last hospitalization to verify events and clarify any imaging or laboratory findings  Patient is eager to start her therapy program       SUBJECTIVE: Patient seen and evaluated at bedside  Only 1 episode of getting up overnight around 5 in the morning to use the restroom without using the call bell  This is an improvement however we were unable to verify whether or not she had been toileted prior to going to bed  Spoke with the patient and the team as well about limiting her p o  fluid intake after 6 PM as well as toileting prior to bedtime to see if we can avoid this  We will continue the one-to-one observation overnight only for safety reasons however the goal is to wean this off completely  I do think this is obtainable as she has been improving from a cognitive and arousal standpoint however we need to do as much as possible to maximize her potential   She denies any fever, chills, nausea, vomiting, cough, shortness of breath, diarrhea, constipation  She does not remember trying to get up overnight but is quite lucid at this time  Her appetite is improving and her eating is improving she continues to work with speech therapy  ASSESSMENT: Stable, progressing    PLAN:    Rehabilitation  • Functional deficits:  Self care and mobility  • Continue current rehabilitation plan of care to maximize function  • Functional update:   Physical Therapy Occupational Therapy Speech Therapy   Weight Bearing Status: Full Weight Bearing  Transfers: Minimal Assistance  Bed Mobility: Minimal Assistance  Amulation Distance (ft): 100 feet (rolling walker)  Ambulation: Minimal Assistance  Assistive Device for Ambulation: Roller Walker  Wheelchair Mobility Distance: 100 ft  Wheelchair Mobility: Supervision  Number of Stairs: 6  Assistive Device for Stairs: Bilateral Hand Rails  Stair Assistance:  Moderate Assistance  Discharge Recommendations: Skilled Nursing Facility   Eating: Supervision  Grooming: Minimal Assistance  Bathing: Moderate Assistance  Bathing: Moderate Assistance  Upper Body Dressing: Moderate Assistance  Lower Body Dressing: Minimal Assistance  Toileting: Moderate Assistance  Toilet Transfer: Moderate Assistance  Cognition: Exceptions to WNL  Cognition: Decreased Memory, Impulsive, Decreased Executive Functions, Decreased Attention, Decreased Comprehension, Decreased Safety  Orientation: Person, Place, Time, Situation   Mode of Communication: Verbal  Speech/Language: Dysarthia  Cognition: Exceptions to WNL  Cognition: Decreased Memory, Decreased Executive Functions, Decreased Attention, Decreased Comprehension, Decreased Safety, Impulsive  Orientation: Person, Place, Situation (fluctuates with times of increased confusion and hallucinations)  Swallowing: Exceptions to WNL  Swallowing: Oral Dysphagia, Pharyngeal Dysphagia, Esophageal Dysphagia, Aspiration Risk, Reflux Precautions  Diet Recommendations: Level 1/Cami Garvey  Discharge Recommendations: Home with:  [address] with[de-identified] 24 Hour Supervision, 24 Hour Assisteance, Family Support, Home Speech Therapy, Outpatient Speech Therapy       • Estimated Discharge: To be determined in team conference    DVT prophylaxis  • On Eliquis     Pain  • Cymbalta 60 mg twice daily  • Gabapentin 100 mg twice daily  • Morphine 7 5 mg 4 times a day as needed  • Acetaminophen 650 mg every 6 hours as needed     Bladder plan  • Continent     Bowel plan  • Continent  • Last bowel movement 3/14     Code Status  • Level 1 full code      * Acute CVA (cerebrovascular accident) [facility])  Assessment & Plan  3/8 - neuro exam stable - continue to monitor closely   CT head 3/7 - Evolving right MCA territory infarcts with associated petechial hemorrhage which is best seen on the recent MRI examination  There is no discrete parenchymal hematoma  - discussed above with johny Hull - no acute bleeding noted   MRI brain 2/22 - 1  New small acute /more recent interval lacunar infarction in the right cerebral peduncle (series 4, image 14)  2   Extensive multifocal large, previously present right middle cerebral artery infarction which is evolving with superimposed areas of evolving hemorrhage likely related to hemorrhagic transformation of subacute right MCA infarction  No significant   mass effect  3   Mild, chronic microangiopathy  S/P thrombectomy 2/12 by Dr Wilton Connors   Currently on a dysphagia 1 diet with thin liquids  Secondary stroke prophylaxis at this time with Eliquis 5 mg twice daily (aspirin d/c'd earlier); and statin; optimal BP control   PT, OT, SLP  Consideration for neuropsychology if patient agreeable  Review modifiable risk factors and provide stroke education  Monitor for poststroke pain  Close neuro follow-up as patient has had multiple strokes in the last 2 months    Hypokalemia  Assessment & Plan  Hypokalemia with potassium down to 3 4 given 40 mill equivalents today on 3/10 but IV infiltrated  Able to get PO dose over weekend  Potasium now WNL  Plan to recheck BMP and CBC tomorrow    Wound of left ankle  Assessment & Plan  On lateral ankle possibly occurred at time of fall, reviewed wound getting SilvaSorb examined today on 3/15 with nursing  - Wound care c/s  1  Cleanse L lateral ankle wound with NSS, pat dry, and apply silvasorb gel to the wound bed  Cover with Allevyn foam dressing  Isacc dressing with T  Change every day and as needed for soilage/dislodgement  2  Preventative nursing skin care orders placed  3   Pressure relief, turning and repositioning with foam offloading wedges and waffle cushion to the care  Prevalon boot     Dysphagia  Assessment & Plan  - Current diet: Modified - D1, NTL after failed VBS, GI consulted, plan for esophogram, protonix added  - Recommend VBS study-VBS completed on 3/9 with evidence for esophageal dysmotility and dysphagia recommending puréed with nectar thick, okay for ice chips  - SLP evaluate and treat decline in swallowing   - Aspiration precautions; HOB completely upright when eating, 100% supervision, monitor for pocketing  - Hold oral intake if patient too confused or lethargic and notify MD   - Oral care with meals and bedtime  - Ensure adequate hydration  - Nutrition consult to assist with management   - Rehab nursing and staff to ensure safe and appropriate feeding to decrease risk of aspiration     - Rehab physician oversight dysphagia management and risks associated with it  - Monitor for appropriate fluid and nutrition intake      Gout of foot  Assessment & Plan  Much improved on prednisone  Uric acid wnl   R 1st MTPJ redness and focal tenderness without significant edema or skin breakdown   - IM feels most c/w gout and have ordered prednisone - taper thru 3/12  - Monitor for improvement or non-improvement and other etiologies as well closely     AMS (altered mental status)  Assessment & Plan  Mentation improving as of 3/13 and more consistently after reduction in opioids as well as addition of Sensipar decreasing her overall calcium levels  3/7 Patient with some sleepiness at times apparently last several days with poor activity tolerance and now s/p fall - appears back to recent baseline on follow-up eval   CT head 3/7 - Evolving right MCA territory infarcts with associated petechial hemorrhage which is best seen on the recent MRI examination  There is no discrete parenchymal hematoma   - discussed above with radiologist Della - no acute bleeding noted   - Ensure adequate nutrition and hydration with altered diet and sub-optimal intake BUN/Cr acceptable   - Continue Eliquis, monitor vitals scout  - Comgmt with IM team   - Patient afebrile; CBC wnl; LFTs wnl; Ammonia wnl  - Continue Abx of UTI   - Calcium elevated > optimal mgmt per endo > adding Sensipar   - Hx of SHAR - continue 2L at night O2 with spot checks - refusing CPAP - obtain noct ox on 2L   - Hold standing long-acting MS Contin 15mg Q12H and transitioned to MS IR 7 5mg Q6H PRN   - Gabapentin 100mg BID weaned down recently            UTI (urinary tract infection)  Assessment & Plan  UA last week with culture growing greater than 100,000 CFU Morganella and Serratia  Started on Ciprofloxacin but transitioned to Cefpodixime per prior providers with plan to continue into 3/10   Dysuria improving     History of stroke  Assessment & Plan  Patient with recent admission in early 2023 for right MCA CVA with petechial hemorrhage and residual left-sided weakness and dysarthria  Patient had mechanical thrombectomy with Dr Jose Acosta on 2/12    Obstructive sleep apnea  Assessment & Plan  Patient refusing CPAP 2/2 claustrophobia - refuses home or hospital CPAP   2L O2 at night with spot checks   Obtain noct ox on 2L; repeat noct ox prior to d/c for possible home DME     History of bacteremia  Assessment & Plan  Unclear etiology initially of whether her altered mental status was related to stroke versus polypharmacy versus opioid overdose versus infection versus seizure  1/2 micrococcus with a second set staph coag negative and followed by infectious disease after course of antibiotic treatment  Has been off of antibiotics and repeat blood cultures negative to date, ID felt may be contaminant    Discoloration of skin of foot  Assessment & Plan  N/V intact - no significant discoloration today   Per daughter and patient patient's foot will become blue or red and change colors frequently  No evidence for infectious etiology and had a lower extremity arterial duplex with no evidence of any significant arterial occlusive disease  May be related to the seronegative spondyloarthropathies  Likely the source of of the inaccurate and inconsistent pulse oximetry readings in the hands as well    Hypercalcemia  Assessment & Plan  Nephrology had been following, nonsuppressed PTH with concerns for primary hyperparathyroidism - they defer to endo on mgmt   C spine "XR 3/7 - Salt-and-pepper appearance of the skull is partially accentuated by the image processing /sharpening algorithm, though prior head CT suggests this is a true finding  Correlate for clinical/laboratory features of hyperparathyroidism; Salt-and-pepper appearance of the skull accentuated by image processing/sharpening algorithm  However, prior head CT shows a mottled appearance of the skull as well suggesting hyperparathyroidism  No focal lytic or blastic lesions  No acute fractures  Endocrine consult   \"SPEP-no monoclonal bands, UPEP- no monoclonal bands, PTH RP less than 2, 25-hydroxy vitamin D 41 7, 1,25 hydroxy vitamin D 23 7 low, Phos 2 5 low, PTH 65 4   Assessment/Plan:  1  Hypercalcemia: Suspected to be on the basis of primary hyperparathyroidism: Corrected calcium continues to increase slightly  Sensipar 30 mg daily was started and is being tolerated  Given history of osteoporosis, if appropriate surgical candidate consider surgery referral as outpatient  Follow-up with endocrinology as outpatient  Monitor corrected calcium level  Adjust dose as needed  Status post a CT of the chest abdomen and pelvis with no overt malignancy  - Patient has allergy to contrast - discussed with endo fellow  -Calcium on 3/10 is at 10 2 and down to 9 5 on 3/13      HTN (hypertension)  Assessment & Plan  Patient with some orthostasis  IM consulted and with overall management at their discretion during ARC course  Monitor orthostatics; PRN ADDY hose, abdominal binder - ensure adequate hydration   Currently on metoprolol succinate 25 mg twice daily with holding parameters   Plan per cardiology did not add an ARB in the setting of heart failure however as blood pressure allows    Bipolar depression ([facility] [address] )  Assessment & Plan  Currently on Wellbutrin 100 mg twice daily, Lamictal 150 mg nightly, and Cymbalta 60 mg twice daily  Consistent with home regimen  Continue monitoring for stability, mood lability    Consider " "neuropsychology consultation if patient agreeable    Fall  Assessment & Plan  3/7 early morning - per report \"unwitnessed FALL; Per RN patient set off bed alarm, but has already fallen onto her left side  Confused and wanting to wash her face  Once more awake, mentation improved  \"  - CTH evolving infarct without new bleeding per rads  - L knee mild pain but good ROM without significant edema or instability > improving - XR unremarkable - monitor   - Mild L hip pain but good ROM and can adequately WB > improving - L hip/pelvis XR - mild hip OA without acute findings -  monitor  - Some L shoulder pain - some mildly decreased AROM, +Muñoz > stable -  L shoulder XR - unremarkable - monitor    - Subtle neck pain but full ROM without radicular complaints or focal weakness, sensory changes > improving - C spine XR - no acute findings or change from prior - chronic changes > Chronic hyperkyphosis  Anterolisthesis at C4-5 is unchanged  No acute subluxation; Salt-and-pepper appearance of the skull accentuated by image processing/sharpening algorithm  Multilevel moderate to severe discogenic and uncovertebral degenerative changes greatest at C5-6 and C6-7  Multilevel mild facet degenerative changes greatest at C4-5  > monitor   - Optimal mgmt with wakefulness/AMS - continue 2L at night O2 with spot checks - refusing CPAP  - Continue 1:1 for safety     Cervical spondylosis  Assessment & Plan  C spine XR 3/7 - Chronic hyperkyphosis  Anterolisthesis at C4-5 is unchanged  No acute subluxation; Salt-and-pepper appearance of the skull accentuated by image processing/sharpening algorithm  However, prior head CT shows a mottled appearance of the skull as well suggesting hyperparathyroidism  No focal lytic or blastic lesions  No acute fractures  Multilevel moderate to severe discogenic and uncovertebral degenerative changes greatest at C5-6 and C6-7    Multilevel mild facet degenerative changes greatest at C4-5   - No signs " of myelopathy or radiculopathy - monitor     Osteoporosis  Assessment & Plan  History of an L1 compression fracture status post kyphoplasty  Was previously on Prolia has not been on since 2020  Outpatient referral to endocrine    CHF (congestive heart failure) ([facility] [address] )  Assessment & Plan  Wt Readings from Last 3 Encounters:   03/15/23 59 2 kg (130 lb 8 2 oz)   02/16/23 62 6 kg (138 lb)   02/12/23 67 9 kg (149 lb 11 1 oz)       Cardiomyopathy with reduced ejection fraction as low as 10 to 15% back in 2004 but has improved since and most recently was 25 to 30% on last admission  CXR 3/7 unremarkable; does not appear volume overloaded   Patient was offered LifeVest but declined  On metoprolol succinate 25 mg twice daily, with plan to add an ARB as her blood pressure would allow  Strict I's and O's and daily weights  Follows with Dr Eliza Valdez at [facility]      Seronegative arthropathy of multiple sites [facility])  [address] with rheumatology and is on Plaquenil 200 mg Monday through Friday and 400 mg on Saturday Sunday  Continue home regimen and follow-up with rheumatology on discharge    Asthma  Assessment & Plan  3/8 saturating well on RA/2L   3/7 afternoon - patient had questionable desat with therapy but had full improved mentation and was in no resp or other distress at that time  - Comgmt with IM who also evaluated patient at this time    - She historically has had difficulty obtaining O2 from finger tips at times - when pulse ox moved to toe she had good oxygenation on RA-2L  - CXR no acute findings - bibasilar scarring   - Continue use of albuterol inhaler 2 puffs every 6 hours as needed    Chronic pain syndrome  Assessment & Plan  History of chronic pain secondary to compression fractures in her thoracic spine and chronic rib fractures   Patient with over sleepiness at times at sedating meds decreased some recently   · Holding standing long-acting MS Contin 15mg Q12H and transitioned to MS IR 7 5mg "Q6H PRN > pain still adequately controlled > monitor closely   · Gabapentin 100mg BID   · Cymbalta 60 milligrams twice daily  · Acetaminophen 650 mg every 6 hours as needed  · Was previously on oxycodone as needed at [facility] but per family due to concerns for polypharmacy and additional opioids beyond her morphine has not been on an acute care since 2/16  · Lidoderm and Bengay  Aqua K also ordered with instructions not to place over same area  Appreciate IM consultants medical co-management  Labs, medications, and imaging personally reviewed  ROS:  A ten point review of systems was completed on 03/15/23 and pertinent positives are listed in subjective section  All other systems reviewed were negative  OBJECTIVE:   BP 90/55 (BP Location: Left arm)   Pulse 96   Temp 98 2 °F (36 8 °C) (Axillary)   Resp 18   Ht 5' 3\" (1 6 m)   Wt 59 2 kg (130 lb 8 2 oz)   SpO2 99%   BMI 23 12 kg/m²     Physical Exam  Vitals and nursing note reviewed  Constitutional:       General: She is not in acute distress  HENT:      Head: Atraumatic  Comments: Left-sided facial droop     Right Ear: External ear normal       Left Ear: External ear normal       Nose: Nose normal  No rhinorrhea  Mouth/Throat:      Mouth: Mucous membranes are moist       Pharynx: Oropharynx is clear  Eyes:      General: No scleral icterus  Cardiovascular:      Rate and Rhythm: Normal rate  Pulses: Normal pulses  Heart sounds: Normal heart sounds  Pulmonary:      Effort: Pulmonary effort is normal  No respiratory distress  Breath sounds: Normal breath sounds  Abdominal:      General: There is no distension  Palpations: Abdomen is soft  Musculoskeletal:      Right lower leg: No edema  Left lower leg: No edema  Skin:     General: Skin is warm and dry        Comments: Left lateral heel wound examined getting SilvaSorb without worsening   Neurological:      Mental Status: She is oriented " to person, place, and time  Comments: Left hemiparesis, dysarthria with some left neglect     Psychiatric:         Mood and Affect: Mood normal          Behavior: Behavior normal           Lab Results   Component Value Date    WBC 7 25 03/13/2023    HGB 12 2 03/13/2023    HCT 38 8 03/13/2023     (H) 03/13/2023     03/13/2023     Lab Results   Component Value Date    SODIUM 138 03/13/2023    K 3 8 03/13/2023     (H) 03/13/2023    CO2 22 03/13/2023    BUN 13 03/13/2023    CREATININE 0 87 03/13/2023    GLUC 82 03/13/2023    CALCIUM 9 5 03/13/2023     Lab Results   Component Value Date    INR 0 95 02/22/2023    INR 0 90 02/12/2023    INR 1 12 04/22/2022    PROTIME 12 8 02/22/2023    PROTIME 12 1 02/12/2023    PROTIME 14 0 04/22/2022           Current Facility-Administered Medications:   •  acetaminophen (TYLENOL) tablet 650 mg, 650 mg, Oral, Q6H PRN, Nicky Shanelle, DO, 650 mg at 03/14/23 1853  •  albuterol (PROVENTIL HFA,VENTOLIN HFA) inhaler 2 puff, 2 puff, Inhalation, Q6H PRN, Nicky Shanelle, DO, 2 puff at 03/07/23 1358  •  aluminum-magnesium hydroxide-simethicone (MYLANTA) oral suspension 30 mL, 30 mL, Oral, Q4H PRN, Nicky Shanelle, DO, 30 mL at 03/15/23 1017  •  apixaban (ELIQUIS) tablet 5 mg, 5 mg, Oral, BID, Nicky Shanelle, DO, 5 mg at 03/15/23 5513  •  atorvastatin (LIPITOR) tablet 40 mg, 40 mg, Oral, QPM, Nicky Shanelle, DO, 40 mg at 03/14/23 1736  •  bisacodyl (DULCOLAX) rectal suppository 10 mg, 10 mg, Rectal, Once, Alfreda Ortega MD  •  buPROPion [facility]) tablet 100 mg, 100 mg, Oral, BID, Nicky Shanelle, DO, 100 mg at 03/15/23 5262  •  cinacalcet (SENSIPAR) tablet 30 mg, 30 mg, Oral, Daily With Breakfast, Anika Singer MD, 30 mg at 03/15/23 8095  •  DULoxetine (CYMBALTA) delayed release capsule 60 mg, 60 mg, Oral, BID, Nicky Timmons DO, 60 mg at 03/15/23 1370  •  gabapentin (NEURONTIN) capsule 100 mg, 100 mg, Oral, BID, Alfreda Ortega MD, 100 mg at 03/15/23 0841  • glycerin-hypromellose- (ARTIFICIAL TEARS) ophthalmic solution 1 drop, 1 drop, Both Eyes, Q4H PRN, Starr Villagomez DO, 1 drop at 03/14/23 2030  •  hydroxychloroquine (PLAQUENIL) tablet 200 mg, 200 mg, Oral, Once per day on Mon Tue Wed Thu Fri, Starr Villagomez DO, 200 mg at 03/15/23 2548  •  hydroxychloroquine (PLAQUENIL) tablet 400 mg, 400 mg, Oral, Once per day on Sun Sat, Carloz DELEON Parnell, DO, 400 mg at 03/12/23 7614  •  lamoTRIgine (LaMICtal) tablet 150 mg, 150 mg, Oral, HS, Starr Villagomez DO, 150 mg at 03/14/23 2205  •  lidocaine (LIDODERM) 5 % patch 1 patch, 1 patch, Topical, Daily, Aleah Landis MD, 1 patch at 03/15/23 0840  •  LORazepam (ATIVAN) tablet 0 25 mg, 0 25 mg, Oral, BID PRN, Ez Israel MD, 0 25 mg at 03/11/23 1054  •  melatonin tablet 3 mg, 3 mg, Oral, HS, Maddie Peters, CRNP, 3 mg at 03/14/23 2205  •  menthol-methyl salicylate (BENGAY) 65-80 % cream, , Apply externally, 4x Daily PRN, Aleah Landis MD, Given at 03/06/23 1227  •  metoprolol succinate (TOPROL-XL) 24 hr tablet 25 mg, 25 mg, Oral, BID, Matheus Dose, CRNP, 25 mg at 03/14/23 1735  •  morphine (MSIR) IR tablet 7 5 mg, 7 5 mg, Oral, Q6H PRN, Ez Israel MD, 7 5 mg at 03/15/23 0844  •  ondansetron (ZOFRAN-ODT) dispersible tablet 4 mg, 4 mg, Oral, Q6H PRN, Starr Villagomez DO, 4 mg at 03/15/23 0906  •  pantoprazole (PROTONIX) EC tablet 40 mg, 40 mg, Oral, Early Morning, Rich Burns MD, 40 mg at 03/15/23 0536  •  polyethylene glycol (MIRALAX) packet 17 g, 17 g, Oral, Daily PRN, Starr Villagomez DO, 17 g at 03/04/23 1930  •  prazosin (MINIPRESS) capsule 1 mg, 1 mg, Oral, HS, Starr Villagomez DO, 1 mg at 03/14/23 2204  •  senna-docusate sodium (SENOKOT S) 8 6-50 mg per tablet 1 tablet, 1 tablet, Oral, BID, Starr Villagomez DO, 1 tablet at 03/15/23 0841  •  sodium chloride (OCEAN) 0 65 % nasal spray 1 spray, 1 spray, Each Nare, Q1H PRN, Juan Manuel Driscoll MD, 1 spray at 03/08/23 0300    Past Medical History: Diagnosis Date   • Anxiety    • Asthma    • Bipolar depression ([facility] )    • Chronic narcotic dependence ([place] [address] )    • Chronic pain    • Depression    • Enterovirus heart infection    • SHAR (obstructive sleep apnea)    • Osteoarthritis    • Peripheral neuropathy    • Plantar fasciitis of right foot    • Senile osteoporosis    • Seronegative arthropathy of multiple sites [facility])    • Undifferentiated connective tissue disease ([place] [address] )        Patient Active Problem List    Diagnosis Date Noted   • Acute CVA (cerebrovascular accident) ([place] [address] ) 02/23/2023   • Hypokalemia 03/10/2023   • AMS (altered mental status) 03/09/2023   • Gout of foot 03/09/2023   • Dysphagia 03/09/2023   • Wound of left ankle 03/09/2023   • UTI (urinary tract infection) 03/06/2023   • Obstructive sleep apnea 02/28/2023   • History of stroke 02/28/2023   • History of bacteremia 02/24/2023   • Hypercalcemia 02/22/2023   • Discoloration of skin of foot 02/22/2023   • Abdominal pain 02/22/2023   • HFrEF (heart failure with reduced ejection fraction) ([facility] ) 02/13/2023   • Stroke ([place] [address] ) 02/12/2023   • HTN (hypertension) 02/12/2023   • Cardiomyopathy ([place] [address] ) 02/07/2023   • Vitamin D insufficiency 02/07/2023   • LAILA (acute kidney injury) ([place] [address] ) 04/23/2022   • Polypharmacy 04/23/2022   • 'light-for-dates' infant with signs of fetal malnutrition 04/22/2022   • Bipolar depression ([facility] ) 03/04/2022   • Rheumatoid arthritis ([place] [address] ) 03/04/2022   • Closed fracture of multiple ribs of right side 03/02/2022   • Traumatic pneumothorax 03/02/2022   • Closed fracture of transverse process of lumbar vertebra ([facility] ) 03/02/2022   • Liver contusion 03/02/2022   • Fall 03/02/2022   • Acute pain due to trauma 03/02/2022   • Right shoulder pain 03/02/2022   • Severe protein-calorie malnutrition ([facility] ) 03/02/2022   • Undifferentiated connective tissue disease ([facility] ) 12/29/2021   • Primary generalized (osteo)arthritis 12/29/2021   • Peripheral neuropathy 12/29/2021   • Osteoporosis 12/29/2021   • Hx of compression fracture of spine 12/29/2021   • Lumbar spondylosis 12/29/2021   • Cervical spondylosis 12/29/2021   • Anemia 06/26/2021   • Chronic pain syndrome    • Asthma    • Ambulatory dysfunction    • Seronegative arthropathy of multiple sites (HCC)    • Gastroesophageal reflux disease without esophagitis    • Mixed stress and urge urinary incontinence    • CHF (congestive heart failure) ([facility] )         Dov Blood DO  Physical Medicine and [facility]     I have spent a total time of 25 minutes on 03/15/23 in caring for this patient including Instructions for management, Patient and family education, Documenting in the medical record and Communicating with other healthcare professionals

## 2023-03-15 NOTE — PROGRESS NOTES
03/15/23 1030   Pain Assessment   Pain Assessment Tool 0-10   Pain Score 8   Pain Location/Orientation Orientation: Lower; Location: Back   Restrictions/Precautions   Precautions Aspiration;Bed/chair alarms;Cognitive; Fall Risk;Supervision on toilet/commode  (nectar thick)   Cognition   Overall Cognitive Status Impaired   Subjective   Subjective increase back pain t/o session   Sit to Stand   Type of Assistance Needed Physical assistance;Verbal cues   Physical Assistance Level 25% or less   Comment max cueing this session for hand placement   Sit to Stand CARE Score 3   Bed-Chair Transfer   Type of Assistance Needed Physical assistance; Adaptive equipment   Physical Assistance Level 25% or less   Comment with RW; difficulty managing RW   Chair/Bed-to-Chair Transfer CARE Score 3   Walk 10 Feet   Type of Assistance Needed Physical assistance; Adaptive equipment   Physical Assistance Level 25% or less   Walk 10 Feet CARE Score 3   Walk 50 Feet with Two Turns   Type of Assistance Needed Physical assistance; Adaptive equipment   Physical Assistance Level 26%-50%   Comment with RW   Walk 50 Feet with Two Turns CARE Score 3   Ambulation   Distance Walked (feet) 50 ft   Assist Device Roller Walker   Gait Pattern Inconsistant Amy;Decreased foot clearance; Forward Flexion; Improper weight shift   Findings amb with functional tasks;   Picking Up Object   Type of Assistance Needed Physical assistance   Physical Assistance Level 26%-50%   Comment with reacher   Picking Up Object CARE Score 3   Therapeutic Interventions   Neuromuscular Re-Education amb around gym using reacher to  cones to work on scanning and balance  constraint induced therapy having pt fold shirts with LUE   Assessment   Treatment Assessment pt had difficulty focusing on tasks today, requiring max cueing for safe hand placement and scanning  pt needed hands on A for balance with unsupported standing with RW and retrieving items with reacher    pt iwth increase back pain when standing performing functional tasks  pt with difficulty with dual tasks with use of RW and standing  pt remains high fall risk at this time  cont to work on safety and attention to L side to improve mobility and decrease burden of care with use of RW   Problem List Decreased strength;Decreased endurance; Impaired balance;Decreased mobility; Decreased coordination;Decreased cognition; Impaired judgement;Decreased safety awareness;Pain   Barriers to Discharge Inaccessible home environment;Decreased caregiver support   PT Barriers   Functional Limitation Ramp negotiation;Stair negotiation;Car transfers;Standing;Transfers; Walking   Plan   Progress Progressing toward goals   Recommendation   PT Discharge Recommendation Post acute rehabilitation services   Equipment Recommended Walker   PT Therapy Minutes   PT Time In 1030   PT Time Out 1130   PT Total Time (minutes) 60   PT Mode of treatment - Individual (minutes) 60   PT Mode of treatment - Concurrent (minutes) 0   PT Mode of treatment - Group (minutes) 0   PT Mode of treatment - Co-treat (minutes) 0   PT Mode of Treatment - Total time(minutes) 60 minutes   PT Cumulative Minutes 940   Therapy Time missed   Time missed?  No

## 2023-03-15 NOTE — PROGRESS NOTES
03/15/23 1230   Pain Assessment   Pain Assessment Tool 0-10   Pain Score No Pain   Restrictions/Precautions   Precautions Aspiration;Bed/chair alarms;Cognitive; Fall Risk;Contact/isolation;Supervision on toilet/commode   Comprehension   Comprehension (FIM) 4 - Understands basic info/conversation 75-90% of time   Expression   Expression (FIM) 4 - Expresses basic info/needs 75-90% of time   Social Interaction   Social Interaction (FIM) 5 - Interacts appropriately with others 90% of time   Problem Solving   Problem solving (FIM) 4 - Solves basic problems 75-89% of time   Memory   Memory (FIM) 4 - Recognizes/recalls/performs 75-89%   Speech/Language/Cognition Assessmetn   Treatment Assessment   Following dysphagia tx session pt engaged in cognitive linguistic tx session targeting executive functions  Pt was oriented x 4 today and w/ improved mentation from last session ( prior week) student SLP had w/ pt  Pt also w/ increased remote/ short term recall, describing details of her weekend, this AM and remembering that she had not seen student SLP since last week  Pt w/ limited recall of stay at [place] [id number] prior to this admission, stated she thought she was at home before this, but w/ intact delayed memory and insight to ask her daughter where she was prior to this at later time during session when daughter called  Pt completed written category matrix where patient provided w/ category and initial letter then asked to generate a fitting word w/ accuracy of 14/16  Pt w/ improved processing time and mental flexibility noted during this task stating alternate words that could be used for multiple stimulus items  Pt w/ awareness of current abilities today, listing the various tasks she needed to complete ( selling her car, bills, paperwork from her PCP and house cleaning) then stating she understands she can't do this by herself anymore and is thankful her daughter is taking control of it and getting so much done for her  Also of note, in prior session pt unable to unlock and answer cell phone, today pt w/ improvement in planning/ sequencing/ problem solving able to unlock cell phone and answer call then place it on speaker phone independently  At this time, pt continues to benefit from skilled ST services targeting cognitive linguistic skills to minimize caregiver burden upon d/c  Eating   Type of Assistance Needed Set-up / clean-up;Supervision;Verbal cues   Physical Assistance Level No physical assistance   Eating CARE Score 4   Swallow Assessment   Swallow Treatment Assessment Daily Dysphagia Tx Note     Patient Name: Prince Richy DOSHI Date: 3/15/2023      Current Risks for Dysphagia & Aspiration: Weak voicing; weak cough; dysarthria; general debilitation; new neuro event; brain injury; cognitive deficit; hx neurologic dx; positioning issues     Current Symptoms/Concerns: Cough; throat clear; with liquids; during meals; with pills; difficulty chewing; decreased oral intake; hx of dysphagia/aspiration     Current diet: puree/level 1 diet and nectar thick liquids      Premorbid diet::upon d/c from prior admission, pt appeared to be on dysphagia level 2/thin liquids upon prior admission for stroke initially; unknown prior to admission, pt rx being on softer foods as well as thickened liquids in the past        Positioning: upright in recliner       Voicing: dysarthric but intelligible     Cognitive Status: awake, alert and cooperative     Positioning: upright in recliner    Items administered:Consistencies Administered: nectar thick, honey thick and puree  Materials administered included pureed eggs, mash potatoes, vanilla magic cup, pureed pears, NTL (4 oz) x2, Honey Thick vital cuisine nutritional shake ( 4 oz)     Total amount of meal consumed:   75% of meal   Approx   200 cc of NTL   Approx 120 cc of HTL       Oral stage:mild  Lip closure: weak, functional around utensils and cup sips   Anterior spillage: w/ larger "bites of puree   Mastication: -   Bolus formation: mild dec w/ puree   Bolus control: mild dec w/ NTL , resulting in coughing x2 suspect premature spill   Transfer: functional   Oral residue: trace amount of gross oral residue w/ puree cleared w/ liquid wash  Pocketing: none noted          Pharyngeal stage:mild- moderate   Swallow promptness: fairly prompt w/ NTL and HTL, delayed w/ purees  Hyolaryngeal elevation:  Weak, dec   Wet voice: none noted   Throat clear: none noted   Cough: x 2 w/ larger sips of NTL and w/ verbal cues after food/ liquid intake  Secondary swallows: w/ puree  Audible swallows: noted w/ cup sips of NT and HT        Esophageal stage:No s/s present today  Summary:     Pt presenting with mild oral and mild- moderate pharyngeal dysphagia today  Symptoms or concerns included suspected decreased control of NTL, coughing x2 w/ larger sips  and anterior loss/spillage of puree suspected pharyngeal swallow delay, suspected decreased hyolaryngeal elevation upon palpation, suspected pharyngeal residue, audible swallows and coughing x2 w/ larger sips of NTL  Pt seen for f/u dysphagia tx assessing tolerance of puree/ NTL diet  Pt required tray set up and assist as needed, able to self feed  Req reminders to cough after every 4-5 bites/ sips then carried over strategy and independently initiated cough \" I know I'm supposed to do this\" x 1 towards end of meal  Pt w/ increased appetite today finishing 75% of meal and approx  320 cc of ( NTL and HTL) liquid by cup sip  Pt still w/ weak but functional lip seal around utensils, however dec oral containment w/ larger bites  Bolus formation/ control dec resulting in suspect premature spillage w/ pt coughing x2 w/ NTL  Trace amount of gross oral residue w/ puree present, effectively cleared w/ liquid wash  Pharyngeal s/s consisted of delayed swallow initiation w/ purees, and weak hyolargyngeal elevation   Pt coughing x2 w/ larger cup sips of NTL , " audible swallows w/ cup sips of NT and HTL, and multiple swallows noted at times w/ puree  At this time pt remains appropriate for current diet of puree/ NTL by cup  ST will f/u to assess pt use of swallow strategies, tolerance for current diet and trials of thin as able/ appropriate  Recommendations:  Recommended Diet:  puree/level 1 diet and nectar thick liquids by CUP ONLY-NO STRAWS  Recommended Form of Medications: whole with puree   Aspiration precautions and compensatory swallowing strategies: upright posture, only feed when fully alert, slow rate of feeding, small bites/sips, no straws, effortful swallow, cough every 4-5 bites/sips, quiet environment (tv off, limit talking, door closed, etc ), alternating bites and sips, OOB for ALL meals, MUST be AWAKE/ALERT for meals  Oral care with suction w/ after ALL meals  Once oral care was completed, pt is allowed ICE CHIPS between meals  However, pt MUST be FULLY upright when consuming ice chips  FULL supervision w/ meals     Results reviewed with:  patient     Aspiration precautions posted  Reflux precautions-->Remain upright at least 30 min post meals     F/u ST tx: Pt is recommended for further skilled SLP services targeting dysphagia therapy in order to maximize oral and pharyngeal swallow skills, while safely supporting PO intake, as well as to improve independent carryover of safe swallow strategies           Plan: Focus on use of swallow strategies for ALL meals as pt cont to require increased cuing, which lack of use of strategies results in increased overt s/s aspiration             Monitor pt's current tolerance for diet             Initiate swallow exercises as able            Thin liquid trials under SLP supervision only     Swallow Assessment Prognosis   Prognosis Fair   Prognosis Considerations Co-morbidities;Previous level of function;Severity of impairments   SLP Therapy Minutes   SLP Time In 1230   SLP Time Out 1330   SLP Total Time (minutes) 60   SLP Mode of treatment - Individual (minutes) 60   SLP Mode of treatment - Concurrent (minutes) 0   SLP Mode of treatment - Group (minutes) 0   SLP Mode of treatment - Co-treat (minutes) 0   SLP Mode of Treatment - Total time(minutes) 60 minutes   SLP Cumulative Minutes 840   Therapy Time missed   Time missed?  No

## 2023-03-15 NOTE — PROGRESS NOTES
03/15/23 1500   Pain Assessment   Pain Assessment Tool 0-10   Pain Score No Pain   Restrictions/Precautions   Precautions Bed/chair alarms;Aspiration;Cognitive; Fall Risk;Supervision on toilet/commode;1:1  (nectar thick)   Cognition   Overall Cognitive Status Impaired   Arousal/Participation Alert; Cooperative   Attention Attends with cues to redirect   Orientation Level Oriented X4   Subjective   Subjective pt had no c/o and ready for PT   Roll Left and Right   Type of Assistance Needed Supervision;Verbal cues   Roll Left and Right CARE Score 4   Sit to Lying   Type of Assistance Needed Supervision;Verbal cues   Comment HOB flat without rail   Sit to Lying CARE Score 4   Lying to Sitting on Side of Bed   Type of Assistance Needed Supervision;Verbal cues   Comment HOB flat without rail   Lying to Sitting on Side of Bed CARE Score 4   Sit to Stand   Type of Assistance Needed Physical assistance;Verbal cues; Adaptive equipment   Physical Assistance Level 25% or less   Comment max VC/TC for L hand placement on walker handle   Sit to Stand CARE Score 3   Bed-Chair Transfer   Type of Assistance Needed Physical assistance; Adaptive equipment;Verbal cues   Physical Assistance Level 26%-50%   Comment with RW, cont to demo dec safe approach to surface at  times would abandon walker to the side   Chair/Bed-to-Chair Transfer CARE Score 3   Car Transfer   Type of Assistance Needed Physical assistance;Verbal cues; Adaptive equipment   Physical Assistance Level 26%-50%   Comment with RW, VC for hand placement but able to bring LE in/out of car without assist   Car Transfer CARE Score 3   Walk 10 Feet   Type of Assistance Needed Physical assistance;Verbal cues   Physical Assistance Level 25% or less   Comment 35 with RW   Walk 10 Feet CARE Score 3   Walk 50 Feet with Two Turns   Type of Assistance Needed Physical assistance;Verbal cues; Adaptive equipment   Physical Assistance Level 25% or less   Comment 50 x 1 with RW   Walk 50 Feet with Two Turns CARE Score 3   Walk 150 Feet   Comment fatigue, safety, weakness   Walking 10 Feet on Uneven Surfaces   Comment (S)  reassess next tx   Toilet Transfer   Type of Assistance Needed Physical assistance;Verbal cues; Adaptive equipment   Physical Assistance Level 26%-50%   Comment walk to/from bathroom with RW, VC for safe management of walker through doorways with poor awareness/correction when hits walker against the frame on the L side   Toilet Transfer CARE Score 3   Assessment   Treatment Assessment pt indep and safety continues to be significantly affected by ongoing L side inattention  Pt still requires VC/TC for L hand placement or physical assist for walker management to safely go through bathroom doorway or when approaching chairs  gait tolerance also impacted by ongoing strength deficits and fatigue so can only do household distances at this time  Pt is still high risk for falls and inc caregiver burden so will benefit from  continued skilled PT intervention to work on strengthening, NMR for gait, postural awareness and balance/righting reactiong training  PT will work on uneven mobility training with RW and steps negotiation tomorrow  Family/Caregiver Present no   Barriers to Discharge Inaccessible home environment;Decreased caregiver support   Plan   Treatment/Interventions Functional transfer training; Therapeutic exercise; Endurance training;Gait training;Bed mobility;Spoke to nursing;OT   Progress Progressing toward goals   Recommendation   PT Discharge Recommendation Post acute rehabilitation services   PT Therapy Minutes   PT Time In 1500   PT Time Out 1538   PT Total Time (minutes) 38   PT Mode of treatment - Individual (minutes) 38   PT Mode of treatment - Concurrent (minutes) 0   PT Mode of treatment - Group (minutes) 0   PT Mode of treatment - Co-treat (minutes) 0   PT Mode of Treatment - Total time(minutes) 38 minutes   PT Cumulative Minutes 633

## 2023-03-15 NOTE — SPEECH THERAPY NOTE
Daily Dysphagia Tx Note     Patient Name: Benny Michael    NLHQV'A Date: 3/15/2023      Current Risks for Dysphagia & Aspiration: Weak voicing; weak cough; dysarthria; general debilitation; new neuro event; brain injury; cognitive deficit; hx neurologic dx; positioning issues     Current Symptoms/Concerns: Cough; throat clear; with liquids; during meals; with pills; difficulty chewing; decreased oral intake; hx of dysphagia/aspiration     Current diet: puree/level 1 diet and nectar thick liquids      Premorbid diet::upon d/c from prior admission, pt appeared to be on dysphagia level 2/thin liquids upon prior admission for stroke initially; unknown prior to admission, pt rx being on softer foods as well as thickened liquids in the past        Positioning: upright in recliner       Voicing: dysarthric but intelligible     Cognitive Status: awake, alert and cooperative     Positioning: upright in recliner    Items administered:Consistencies Administered: nectar thick, honey thick and puree  Materials administered included pureed eggs, mash potatoes, vanilla magic cup, pureed pears, NTL (4 oz) x2, Honey Thick vital cuisine nutritional shake ( 4 oz)     Total amount of meal consumed:   75% of meal   Approx   200 cc of NTL   Approx 120 cc of HTL       Oral stage:mild  Lip closure: weak, functional around utensils and cup sips   Anterior spillage: w/ larger bites of puree   Mastication: -   Bolus formation: mild dec w/ puree   Bolus control: mild dec w/ NTL , resulting in coughing x2 suspect premature spill   Transfer: functional   Oral residue: trace amount of gross oral residue w/ puree cleared w/ liquid wash  Pocketing: none noted          Pharyngeal stage:mild- moderate   Swallow promptness: fairly prompt w/ NTL and HTL, delayed w/ purees  Hyolaryngeal elevation:  Weak, dec   Wet voice: none noted   Throat clear: none noted   Cough: x 2 w/ larger sips of NTL and w/ verbal cues after food/ liquid intake  Secondary "swallows: w/ puree  Audible swallows: noted w/ cup sips of NT and HT        Esophageal stage:No s/s present today  Summary:     Pt presenting with mild oral and mild- moderate pharyngeal dysphagia today  Symptoms or concerns included suspected decreased control of NTL, coughing x2 w/ larger sips  and anterior loss/spillage of puree suspected pharyngeal swallow delay, suspected decreased hyolaryngeal elevation upon palpation, suspected pharyngeal residue, audible swallows and coughing x2 w/ larger sips of NTL  Pt seen for f/u dysphagia tx assessing tolerance of puree/ NTL diet  Pt required tray set up and assist as needed, able to self feed  Req reminders to cough after every 4-5 bites/ sips then carried over strategy and independently initiated cough \" I know I'm supposed to do this\" x 1 towards end of meal  Pt w/ increased appetite today finishing 75% of meal and approx  320 cc of ( NTL and HTL) liquid by cup sip  Pt still w/ weak but functional lip seal around utensils, however dec oral containment w/ larger bites  Bolus formation/ control dec resulting in suspect premature spillage w/ pt coughing x2 w/ NTL  Trace amount of gross oral residue w/ puree present, effectively cleared w/ liquid wash  Pharyngeal s/s consisted of delayed swallow initiation w/ purees, and weak hyolargyngeal elevation  Pt coughing x2 w/ larger cup sips of NTL , audible swallows w/ cup sips of NT and HTL, and multiple swallows noted at times w/ puree  At this time pt remains appropriate for current diet of puree/ NTL by cup  ST will f/u to assess pt use of swallow strategies, tolerance for current diet and trials of thin as able/ appropriate          Recommendations:  Recommended Diet:  puree/level 1 diet and nectar thick liquids by CUP ONLY-NO STRAWS  Recommended Form of Medications: whole with puree   Aspiration precautions and compensatory swallowing strategies: upright posture, only feed when fully alert, slow rate of " feeding, small bites/sips, no straws, effortful swallow, cough every 4-5 bites/sips, quiet environment (tv off, limit talking, door closed, etc ), alternating bites and sips, OOB for ALL meals, MUST be AWAKE/ALERT for meals      Oral care with suction w/ after ALL meals  Once oral care was completed, pt is allowed ICE CHIPS between meals  However, pt MUST be FULLY upright when consuming ice chips      FULL supervision w/ meals     Results reviewed with:  patient     Aspiration precautions posted  Reflux precautions-->Remain upright at least 30 min post meals     F/u ST tx: Pt is recommended for further skilled SLP services targeting dysphagia therapy in order to maximize oral and pharyngeal swallow skills, while safely supporting PO intake, as well as to improve independent carryover of safe swallow strategies          Plan: Focus on use of swallow strategies for ALL meals as pt cont to require increased cuing, which lack of use of strategies results in increased overt s/s aspiration             Monitor pt's current tolerance for diet             Initiate swallow exercises as able            Thin liquid trials under SLP supervision only        Following dysphagia tx session pt engaged in cognitive linguistic tx session targeting executive functions  Pt was oriented x 4 today and w/ improved mentation from last session ( prior week) student SLP had w/ pt  Pt also w/ increased remote/ short term recall, describing details of her weekend, this AM and remembering that she had not seen student SLP since last week  Pt w/ limited recall of stay at [place] [address] prior to this admission, stated she thought she was at home before this, but w/ intact delayed memory and insight to ask her daughter where she was prior to this at later time during session when daughter called  Pt completed written category matrix where patient provided w/ category and initial letter then asked to generate a fitting word w/ accuracy of 14/16   Pt w/ improved processing time and mental flexibility noted during this task stating alternate words that could be used for multiple stimulus items  Pt w/ awareness of current abilities today, listing the various tasks she needed to complete ( selling her car, bills, paperwork from her PCP and house cleaning) then stating she understands she can't do this by herself anymore and is thankful her daughter is taking control of it and getting so much done for her  Also of note, in prior session pt unable to unlock and answer cell phone, today pt w/ improvement in planning/ sequencing/ problem solving able to unlock cell phone and answer call then place it on speaker phone independently  At this time, pt continues to benefit from skilled ST services targeting cognitive linguistic skills to minimize caregiver burden upon d/c

## 2023-03-15 NOTE — PROGRESS NOTES
Internal Medicine Progress Note  Patient: Daly Ghosh  Age/sex: 75 y o  female  Medical Record #: [id number]      ASSESSMENT/PLAN: (Interval History)  Daly Ghosh is seen and examined and management for following issues:    Recent right MCA infarct W/petechial hemorrhage  • S/P thrombectomy by Dr Daksha Hair 2/12  • Continue Eliquis 5 mg twice daily/statin   • Follow-up with neurosurgery/neurology as scheduled     NICM  • EF has been as low as 10 to 15% in the past  • In 2022 was 65%  • Now down to 30% = pt declined LifeVest  • Cardiology felt this was secondary to her recent CVA as well as possible medication noncompliance  • They recommend continuing beta-blocker and adding ARB when BP allows  • Will need outpatient ischemia work-up  • Followed by Dr Wayne Schumacher at [facility] [place] however would like to follow with [facility] on dc  • Can add arb if bp improves     Seronegative rheumatoid arthritis  • A/T rheumatology takes Plaquenil 200 mg Monday through Friday 400 mg on Saturday and Sunday  • Continue same  • Follow-up on discharge     Hypercalcemia  · Seen by renal/endocrine on acute side  · Started on Sensipar and now down to 9 5  · Endocrine input appreciated  Will need outpt follow-up and potentially a surgical evaluation  Fall  · 3/6/23 onto left side  · Abrasion on left knee and Xray negative for fracture  · Could not verify whether or not she struck her head  · CT of head stable  · She also c/o L hip pain  X-ray negative for fx  · Fall precautions all in place       R great toe gout  · S/p prednisone  · Uric acid normal  · improved     Chronic pain  • Due to compression fractures of her thoracic spine/chronic rib fractures  • Lethargic 3/7 and MS contin changed to IR  • Pain management per PMR     Bipolar disorder  • Continue current medication regimen  • Follow-up with psychiatry on discharge  • Neuropsych following    Anxiety  · Cont current meds  · Moods are stable    Confusion  · Improved  · Decrease 1:1 to qhs only per PMR  · Cont to wean as tolerated    Tachycardia  · May be 2/2 anxiety/MS contin dc'd  · Monitor for now  · stable     SHAR  • Does not use CPAP at home d/t claustraphobia  • OK use oxygen overnight     Dysuria/Urinary frequency/incontinence  • Urine culture +CRE  • s/p vantin 200mg bid through 3/9/23  • Symptoms improved    Hypokalemia  · Improved after replacement  · Repleted kcl 40 meq daily x 2  · K 3 8  · BMP Thursday         DC planning: 3/22/23=likely SNF       The above assessment and plan was reviewed and updated as determined by my evaluation of the patient on 3/15/2023      Labs:   Results from last 7 days   Lab Units 03/13/23  0612   WBC Thousand/uL 7 25   HEMOGLOBIN g/dL 12 2   HEMATOCRIT % 38 8   PLATELETS Thousands/uL 172     Results from last 7 days   Lab Units 03/13/23  0612 03/11/23  0551   SODIUM mmol/L 138 145   POTASSIUM mmol/L 3 8 3 4*   CHLORIDE mmol/L 113* 111*   CO2 mmol/L 22 29   BUN mg/dL 13 11   CREATININE mg/dL 0 87 0 96   CALCIUM mg/dL 9 5 10 0                   Review of Scheduled Meds:  Current Facility-Administered Medications   Medication Dose Route Frequency Provider Last Rate   • acetaminophen  650 mg Oral Q6H PRN Carie Risk, DO     • albuterol  2 puff Inhalation Q6H PRN Carie Risk, DO     • aluminum-magnesium hydroxide-simethicone  30 mL Oral Q4H PRN Carie Risk, DO     • apixaban  5 mg Oral BID Carie Risk, DO     • atorvastatin  40 mg Oral QPM Carie Risk, DO     • bisacodyl  10 mg Rectal Once Deepti Morin MD     • buPROPion  100 mg Oral BID Carie Risk, DO     • cinacalcet  30 mg Oral Daily With Breakfast Anika Franks MD     • DULoxetine  60 mg Oral BID Carie Risk, DO     • gabapentin  100 mg Oral BID Deepti Morin MD     • glycerin-hypromellose-  1 drop Both Eyes Q4H PRN Carie Risk, DO     • hydroxychloroquine  200 mg Oral Once per day on Mon Tue Wed Thu Fri Ana Severino Soheila, DO     • hydroxychloroquine  400 mg Oral Once per day on Sun Sat Lety Bijou, DO     • lamoTRIgine  150 mg Oral HS Lety Bijou, DO     • lidocaine  1 patch Topical Daily Roderick Doyle MD     • LORazepam  0 25 mg Oral BID PRN Diamond Closs, MD     • melatonin  3 mg Oral HS LAURA Mejia     • menthol-methyl salicylate   Apply externally 4x Daily PRN Roderick Doyle MD     • metoprolol succinate  25 mg Oral BID LAURA Mcintyre     • morphine  7 5 mg Oral Q6H PRN Diamond Closs, MD     • ondansetron  4 mg Oral Q6H PRN Lety Bijou, DO     • pantoprazole  40 mg Oral Early Morning Quinten Salguero MD     • polyethylene glycol  17 g Oral Daily PRN Lety Bijou, DO     • prazosin  1 mg Oral HS Lety Bijou, DO     • senna-docusate sodium  1 tablet Oral BID Lety Bijou, DO     • sodium chloride  1 spray Each Nare Q1H PRN Abhay Izquierdo MD         Subjective/ HPI: Patient seen and examined  Patients overnight issues or events were reviewed with nursing or staff during rounds or morning huddle session  New or overnight issues include the following:     Pt seen in her room  No complaints this am except a little stiffness    ROS:   A 10 point ROS was performed; negative except as noted above  Imaging:     FL barium swallow video w speech   Final Result by SYSTEMGENERATED, DOCUMENTATION (03/09 1306)      FL barium swallow video w speech   Final Result by  (03/09 1550)      XR hip/pelv 2-3 vws left if performed   Final Result by Toni Chan MD (03/08 1633)      No acute osseous abnormality  Workstation performed: [id number]         XR shoulder 2+ vw left   Final Result by Toni Chan MD (03/08 1636)      No acute osseous abnormality  Workstation performed: [id number]         XR spine cervical 2 or 3 vw injury   Final Result by Toni Chan MD (03/08 1643)      No acute osseous abnormality or significant change from priors  Salt-and-pepper appearance of the skull is partially accentuated by the image processing /sharpening algorithm, though prior head CT suggests this is a true finding  Correlate for clinical/laboratory features of hyperparathyroidism  Workstation performed: [id number]         XR chest portable   Final Result by Mireille Thorpe MD (03/07 1650)      No acute cardiopulmonary disease  Workstation performed: [id number]         CT head wo contrast   Final Result by Luis Alberto Shafer MD (03/07 4219)      Evolving right MCA territory infarcts with associated petechial hemorrhage which is best seen on the recent MRI examination  There is no discrete parenchymal hematoma  Workstation performed: [id number]         XR knee 1 or 2 vw left   Final Result by Mireille Thorpe MD (03/07 8596)      No acute osseous abnormality              Workstation performed: [id number]             *Labs /Radiology studies reviewed  *Medications reviewed and reconciled as needed  *Please refer to order section for additional ordered labs studies  *Case discussed with primary attending during morning huddle case rounds    Physical Examination:  Vitals:   Vitals:    03/14/23 2159 03/14/23 2232 03/15/23 0536 03/15/23 0842   BP: 111/65  123/67 90/55   BP Location: Left arm  Left arm Left arm   Pulse:   91 96   Resp:   18 18   Temp:  98 °F (36 7 °C) 98 2 °F (36 8 °C)    TempSrc:  Axillary Axillary    SpO2:   99%    Weight:   59 2 kg (130 lb 8 2 oz)    Height:           GEN: No apparent distress, frail interactive  NEURO: Alert and oriented x3; left neglect, cognitive issues as well as recall issues, mild dysarthria  HEENT: Pupils are equal and reactive, EOMI, mucous membranes are moist, face asymmetrical  CV: S1 S2 regular, no MRG, no peripheral edema noted  RESP: Lungs are clear bilaterally, no wheezes, rales or rhonchi noted, on room air, respirations easy and non labored  GI: Flat, soft non tender, non distended; +BS x4  : Voiding without difficulty, incontinent at times  MUSC: Moves all extremities; left hemiparesis; right great toe with improved erythema and less painful  SKIN: pink, dry skin, poor turgor, b/l LE with dressings in place for both prevention/current wounds         The above physical exam was reviewed and updated as determined by my evaluation of the patient on 3/15/2023  Invasive Devices     None                    VTE Pharmacologic Prophylaxis: Eliquis  Code Status: Level 1 - Full Code  Current Length of Stay: 15 day(s)      Total time spent:  30 minutes  with more than 50% spent counseling/coordinating care  Counseling includes discussion with patient re: progress  and discussion with patient of his/her current medical state/information  Coordination of patient's care was performed in conjunction with primary service  Time invested included review of patient's labs, vitals, and management of their comorbidities with continued monitoring  In addition, this patient was discussed with medical team including physician and advanced extenders  The care of the patient was extensively discussed and appropriate treatment plan was formulated unique for this patient  Medical decision making for the day was made by supervising physician unless otherwise noted in their attestation statement  ** Please Note:  voice to text software may have been used in the creation of this document   Although proof errors in transcription or interpretation are a potential of such software**

## 2023-03-15 NOTE — PLAN OF CARE
Problem: PAIN - ADULT  Goal: Verbalizes/displays adequate comfort level or baseline comfort level  Description: Interventions:  - Encourage patient to monitor pain and request assistance  - Assess pain using appropriate pain scale  - Administer analgesics based on type and severity of pain and evaluate response  - Implement non-pharmacological measures as appropriate and evaluate response  - Consider cultural and social influences on pain and pain management  - Notify physician/advanced practitioner if interventions unsuccessful or patient reports new pain  Outcome: Progressing     Problem: INFECTION - ADULT  Goal: Absence or prevention of progression during hospitalization  Description: INTERVENTIONS:  - Assess and monitor for signs and symptoms of infection  - Monitor lab/diagnostic results  - Monitor all insertion sites, i e  indwelling lines, tubes, and drains  - Monitor endotracheal if appropriate and nasal secretions for changes in amount and color  - [place] appropriate cooling/warming therapies per order  - Administer medications as ordered  - Instruct and encourage patient and family to use good hand hygiene technique  - Identify and instruct in appropriate isolation precautions for identified infection/condition  Outcome: Progressing  Goal: Absence of fever/infection during neutropenic period  Description: INTERVENTIONS:  - Monitor WBC    Outcome: Progressing     Problem: SAFETY ADULT  Goal: Patient will remain free of falls  Description: INTERVENTIONS:  - Educate patient/family on patient safety including physical limitations  - Instruct patient to call for assistance with activity   - Consult OT/PT to assist with strengthening/mobility   - Keep Call bell within reach  - Keep bed low and locked with side rails adjusted as appropriate  - Keep care items and personal belongings within reach  - Initiate and maintain comfort rounds  - Make Fall Risk Sign visible to staff  - Offer Toileting every 2 Hours, in advance of need  - Initiate/Maintain bed  chair alarm  - Obtain necessary fall risk management equipment: nonskid socks  - Apply yellow socks and bracelet for high fall risk patients  - Consider moving patient to room near nurses station  Outcome: Progressing  Goal: Maintain or return to baseline ADL function  Description: INTERVENTIONS:  -  Assess patient's ability to carry out ADLs; assess patient's baseline for ADL function and identify physical deficits which impact ability to perform ADLs (bathing, care of mouth/teeth, toileting, grooming, dressing, etc )  - Assess/evaluate cause of self-care deficits   - Assess range of motion  - Assess patient's mobility; develop plan if impaired  - Assess patient's need for assistive devices and provide as appropriate  - Encourage maximum independence but intervene and supervise when necessary  - Involve family in performance of ADLs  - Assess for home care needs following discharge   - Consider OT consult to assist with ADL evaluation and planning for discharge  - Provide patient education as appropriate  Outcome: Progressing  Goal: Maintains/Returns to pre admission functional level  Description: INTERVENTIONS:  - Perform BMAT or MOVE assessment daily    - Set and communicate daily mobility goal to care team and patient/family/caregiver  - Collaborate with rehabilitation services on mobility goals if consulted  - Perform Range of Motion 3 times a day  - Reposition patient every 2 hours    - Dangle patient 3 times a day  - Stand patient 3 times a day  - Ambulate patient 3 times a day  - Out of bed to chair 3 times a day   - Out of bed for meals 3 times a day  - Out of bed for toileting  - Record patient progress and toleration of activity level   Outcome: Progressing     Problem: DISCHARGE PLANNING  Goal: Discharge to home or other facility with appropriate resources  Description: INTERVENTIONS:  - Identify barriers to discharge w/patient and caregiver  - Arrange for needed discharge resources and transportation as appropriate  - Identify discharge learning needs (meds, wound care, etc )  - Arrange for interpretive services to assist at discharge as needed  - Refer to Case Management Department for coordinating discharge planning if the patient needs post-hospital services based on physician/advanced practitioner order or complex needs related to functional status, cognitive ability, or social support system  Outcome: Progressing     Problem: Nutrition/Hydration-ADULT  Goal: Nutrient/Hydration intake appropriate for improving, restoring or maintaining nutritional needs  Description: Monitor and assess patient's nutrition/hydration status for malnutrition  Collaborate with interdisciplinary team and initiate plan and interventions as ordered  Monitor patient's weight and dietary intake as ordered or per policy  Utilize nutrition screening tool and intervene as necessary  Determine patient's food preferences and provide high-protein, high-caloric foods as appropriate       INTERVENTIONS:  - Monitor oral intake, urinary output, labs, and treatment plans  - Assess nutrition and hydration status and recommend course of action  - Evaluate amount of meals eaten  - Assist patient with eating if necessary   - Allow adequate time for meals  - Recommend/ encourage appropriate diets, oral nutritional supplements, and vitamin/mineral supplements  - Order, calculate, and assess calorie counts as needed  - Recommend, monitor, and adjust tube feedings and TPN/PPN based on assessed needs  - Assess need for intravenous fluids  - Provide specific nutrition/hydration education as appropriate  - Include patient/family/caregiver in decisions related to nutrition  Outcome: Progressing     Problem: Prexisting or High Potential for Compromised Skin Integrity  Goal: Skin integrity is maintained or improved  Description: INTERVENTIONS:  - Identify patients at risk for skin breakdown  - Assess and monitor skin integrity  - Assess and monitor nutrition and hydration status  - Monitor labs   - Assess for incontinence   - Turn and reposition patient  - Assist with mobility/ambulation  - Relieve pressure over bony prominences  - Avoid friction and shearing  - Provide appropriate hygiene as needed including keeping skin clean and dry  - Evaluate need for skin moisturizer/barrier cream  - Collaborate with interdisciplinary team   - Patient/family teaching  - Consider wound care consult   Outcome: Progressing     Problem: MOBILITY - ADULT  Goal: Maintain or return to baseline ADL function  Description: INTERVENTIONS:  -  Assess patient's ability to carry out ADLs; assess patient's baseline for ADL function and identify physical deficits which impact ability to perform ADLs (bathing, care of mouth/teeth, toileting, grooming, dressing, etc )  - Assess/evaluate cause of self-care deficits   - Assess range of motion  - Assess patient's mobility; develop plan if impaired  - Assess patient's need for assistive devices and provide as appropriate  - Encourage maximum independence but intervene and supervise when necessary  - Involve family in performance of ADLs  - Assess for home care needs following discharge   - Consider OT consult to assist with ADL evaluation and planning for discharge  - Provide patient education as appropriate  Outcome: Progressing  Goal: Maintains/Returns to pre admission functional level  Description: INTERVENTIONS:  - Perform BMAT or MOVE assessment daily    - Set and communicate daily mobility goal to care team and patient/family/caregiver  - Collaborate with rehabilitation services on mobility goals if consulted  - Perform Range of Motion 3 times a day  - Reposition patient every 2 hours    - Dangle patient 3 times a day  - Stand patient 3 times a day  - Ambulate patient 3 times a day  - Out of bed to chair 3 times a day   - Out of bed for meals 3 times a day  - Out of bed for toileting  - Record patient progress and toleration of activity level   Outcome: Progressing

## 2023-03-15 NOTE — PLAN OF CARE
Problem: PAIN - ADULT  Goal: Verbalizes/displays adequate comfort level or baseline comfort level  Description: Interventions:  - Encourage patient to monitor pain and request assistance  - Assess pain using appropriate pain scale  - Administer analgesics based on type and severity of pain and evaluate response  - Implement non-pharmacological measures as appropriate and evaluate response  - Consider cultural and social influences on pain and pain management  - Notify physician/advanced practitioner if interventions unsuccessful or patient reports new pain  Outcome: Progressing     Problem: INFECTION - ADULT  Goal: Absence or prevention of progression during hospitalization  Description: INTERVENTIONS:  - Assess and monitor for signs and symptoms of infection  - Monitor lab/diagnostic results  - Monitor all insertion sites, i e  indwelling lines, tubes, and drains  - Monitor endotracheal if appropriate and nasal secretions for changes in amount and color  - [place] appropriate cooling/warming therapies per order  - Administer medications as ordered  - Instruct and encourage patient and family to use good hand hygiene technique  - Identify and instruct in appropriate isolation precautions for identified infection/condition  Outcome: Progressing  Goal: Absence of fever/infection during neutropenic period  Description: INTERVENTIONS:  - Monitor WBC    Outcome: Progressing     Problem: SAFETY ADULT  Goal: Patient will remain free of falls  Description: INTERVENTIONS:  - Educate patient/family on patient safety including physical limitations  - Instruct patient to call for assistance with activity   - Consult OT/PT to assist with strengthening/mobility   - Keep Call bell within reach  - Keep bed low and locked with side rails adjusted as appropriate  - Keep care items and personal belongings within reach  - Initiate and maintain comfort rounds  - Make Fall Risk Sign visible to staff  - Offer Toileting every 2 Hours, in advance of need  - Initiate/Maintain bed/chair alarm  - Obtain necessary fall risk management equipment: alarms  - Apply yellow socks and bracelet for high fall risk patients  - Consider moving patient to room near nurses station  Outcome: Progressing  Goal: Maintain or return to baseline ADL function  Description: INTERVENTIONS:  -  Assess patient's ability to carry out ADLs; assess patient's baseline for ADL function and identify physical deficits which impact ability to perform ADLs (bathing, care of mouth/teeth, toileting, grooming, dressing, etc )  - Assess/evaluate cause of self-care deficits   - Assess range of motion  - Assess patient's mobility; develop plan if impaired  - Assess patient's need for assistive devices and provide as appropriate  - Encourage maximum independence but intervene and supervise when necessary  - Involve family in performance of ADLs  - Assess for home care needs following discharge   - Consider OT consult to assist with ADL evaluation and planning for discharge  - Provide patient education as appropriate  Outcome: Progressing  Goal: Maintains/Returns to pre admission functional level  Description: INTERVENTIONS:  - Perform BMAT or MOVE assessment daily    - Set and communicate daily mobility goal to care team and patient/family/caregiver  - Collaborate with rehabilitation services on mobility goals if consulted  - Perform Range of Motion 3 times a day  - Reposition patient every 2 hours    - Dangle patient 3 times a day  - Stand patient 3 times a day  - Ambulate patient 3 times a day  - Out of bed to chair 3 times a day   - Out of bed for meals 3 times a day  - Out of bed for toileting  - Record patient progress and toleration of activity level   Outcome: Progressing     Problem: DISCHARGE PLANNING  Goal: Discharge to home or other facility with appropriate resources  Description: INTERVENTIONS:  - Identify barriers to discharge w/patient and caregiver  - Arrange for needed discharge resources and transportation as appropriate  - Identify discharge learning needs (meds, wound care, etc )  - Arrange for interpretive services to assist at discharge as needed  - Refer to Case Management Department for coordinating discharge planning if the patient needs post-hospital services based on physician/advanced practitioner order or complex needs related to functional status, cognitive ability, or social support system  Outcome: Progressing     Problem: Nutrition/Hydration-ADULT  Goal: Nutrient/Hydration intake appropriate for improving, restoring or maintaining nutritional needs  Description: Monitor and assess patient's nutrition/hydration status for malnutrition  Collaborate with interdisciplinary team and initiate plan and interventions as ordered  Monitor patient's weight and dietary intake as ordered or per policy  Utilize nutrition screening tool and intervene as necessary  Determine patient's food preferences and provide high-protein, high-caloric foods as appropriate       INTERVENTIONS:  - Monitor oral intake, urinary output, labs, and treatment plans  - Assess nutrition and hydration status and recommend course of action  - Evaluate amount of meals eaten  - Assist patient with eating if necessary   - Allow adequate time for meals  - Recommend/ encourage appropriate diets, oral nutritional supplements, and vitamin/mineral supplements  - Order, calculate, and assess calorie counts as needed  - Recommend, monitor, and adjust tube feedings and TPN/PPN based on assessed needs  - Assess need for intravenous fluids  - Provide specific nutrition/hydration education as appropriate  - Include patient/family/caregiver in decisions related to nutrition  Outcome: Progressing     Problem: Prexisting or High Potential for Compromised Skin Integrity  Goal: Skin integrity is maintained or improved  Description: INTERVENTIONS:  - Identify patients at risk for skin breakdown  - Assess and monitor skin integrity  - Assess and monitor nutrition and hydration status  - Monitor labs   - Assess for incontinence   - Turn and reposition patient  - Assist with mobility/ambulation  - Relieve pressure over bony prominences  - Avoid friction and shearing  - Provide appropriate hygiene as needed including keeping skin clean and dry  - Evaluate need for skin moisturizer/barrier cream  - Collaborate with interdisciplinary team   - Patient/family teaching  - Consider wound care consult   Outcome: Progressing     Problem: MOBILITY - ADULT  Goal: Maintain or return to baseline ADL function  Description: INTERVENTIONS:  -  Assess patient's ability to carry out ADLs; assess patient's baseline for ADL function and identify physical deficits which impact ability to perform ADLs (bathing, care of mouth/teeth, toileting, grooming, dressing, etc )  - Assess/evaluate cause of self-care deficits   - Assess range of motion  - Assess patient's mobility; develop plan if impaired  - Assess patient's need for assistive devices and provide as appropriate  - Encourage maximum independence but intervene and supervise when necessary  - Involve family in performance of ADLs  - Assess for home care needs following discharge   - Consider OT consult to assist with ADL evaluation and planning for discharge  - Provide patient education as appropriate  Outcome: Progressing  Goal: Maintains/Returns to pre admission functional level  Description: INTERVENTIONS:  - Perform BMAT or MOVE assessment daily    - Set and communicate daily mobility goal to care team and patient/family/caregiver  - Collaborate with rehabilitation services on mobility goals if consulted  - Perform Range of Motion 3 times a day  - Reposition patient every 2 hours    - Dangle patient 3 times a day  - Stand patient 3 times a day  - Ambulate patient 3 times a day  - Out of bed to chair 3 times a day   - Out of bed for meals 3 times a day  - Out of bed for toileting  - Record patient progress and toleration of activity level   Outcome: Progressing

## 2023-03-15 NOTE — WOUND OSTOMY CARE
Progress Note - Wound   Billie Pack 75 y o  female MRN: [id number]  Unit/Bed#: -01 Encounter: [id number]        Assessment:   Patient is seen for wound care follow-up  Patient is a min/mod assist for turning and repositioning  Patient seen OOB in recliner  Patient is incontinent at times of bowel and bladder  Findings:  B/L heels and sacro-buttocks are dry intact and yaquelin with no skin loss or wounds present  Recommend preventative Hydraguard Cream and proper offloading/ repositioning  1  Left Lateral Ankle Wound: wound bed is irregular in shape with partial thickness skin loss  Unclear etiology  Wound bed is 100% yellow biofilm  Dilia-wound is pink in color  No drainage noted  Recommend continuing with silvasorb gel and allevyn foam dressing  No induration, fluctuance, odor, warmth/temperature differences, redness, or purulence noted to the above noted wounds and skin areas assessed  New dressings applied per orders listed below  Patient tolerated well- no s/s of non-verbal pain or discomfort observed during the encounter  Bedside nurse aware of plan of care  See flow sheets for more detailed assessment findings  Orders listed below and wound care will continue to follow, call or tiger text with questions  Skin Care Plan:  1-Cleanse L lateral ankle wound with NSS, pat dry, and apply silvasorb gel to the wound bed  Cover with Allevyn foam dressing  Isacc dressing with T  Change every day and as needed for soilage/dislodgement  2-Turn/reposition q2h or when medically stable for pressure re-distribution on skin   3-Elevate heels to offload pressure  4-Moisturize skin daily with skin nourishing cream  5-Ehob cushion in chair when out of bed  6-Preventative Hydraguard to buttocks, sacrum, and bilateral heels BID and PRN  7-Follow-up with the [facility] care center as an outpatient - please call [phone] for an appointment           WOUNDS:  Wound 03/08/23 Ankle Anterior; Left (Active)   Wound Image   03/15/23 0937   Wound Description Yellow 03/15/23 0937   Pressure Injury Stage U 03/10/23 0945   Dilia-wound Assessment Pink 03/15/23 0937   Wound Length (cm) 0 5 cm 03/15/23 0937   Wound Width (cm) 1 cm 03/15/23 0937   Wound Depth (cm) 0 1 cm 03/15/23 0937   Wound Surface Area (cm^2) 0 5 cm^2 03/15/23 0937   Wound Volume (cm^3) 0 05 cm^3 03/15/23 0937   Calculated Wound Volume (cm^3) 0 05 cm^3 03/15/23 0937   Change in Wound Size % 28 57 03/15/23 0937   Drainage Amount None 03/15/23 0937   Non-staged Wound Description Partial thickness 03/15/23 0937   Treatments Cleansed;Irrigation with NSS;Site care 03/15/23 0937   Dressing Foam, Silicon (eg  Allevyn, etc) 03/15/23 0937   Wound packed?  No 03/15/23 0937   Dressing Changed Changed 03/15/23 0937   Patient Tolerance Tolerated well 03/15/23 0937   Dressing Status Clean;Dry;New drainage 03/15/23 0937                Kay Abarca RN, BSN

## 2023-03-16 LAB
ANION GAP SERPL CALCULATED.3IONS-SCNC: 1 MMOL/L (ref 4–13)
BUN SERPL-MCNC: 16 MG/DL (ref 5–25)
CALCIUM SERPL-MCNC: 9.1 MG/DL (ref 8.3–10.1)
CHLORIDE SERPL-SCNC: 112 MMOL/L (ref 96–108)
CO2 SERPL-SCNC: 28 MMOL/L (ref 21–32)
CREAT SERPL-MCNC: 0.91 MG/DL (ref 0.6–1.3)
GFR SERPL CREATININE-BSD FRML MDRD: 65 ML/MIN/1.73SQ M
GLUCOSE SERPL-MCNC: 104 MG/DL (ref 65–140)
POTASSIUM SERPL-SCNC: 3.8 MMOL/L (ref 3.5–5.3)
SODIUM SERPL-SCNC: 141 MMOL/L (ref 135–147)

## 2023-03-16 RX ADMIN — SENNOSIDES AND DOCUSATE SODIUM 1 TABLET: 8.6; 5 TABLET ORAL at 09:51

## 2023-03-16 RX ADMIN — GABAPENTIN 100 MG: 100 CAPSULE ORAL at 09:51

## 2023-03-16 RX ADMIN — BUPROPION HYDROCHLORIDE TABLETS 100 MG: 100 TABLET, FILM COATED ORAL at 18:23

## 2023-03-16 RX ADMIN — PANTOPRAZOLE SODIUM 40 MG: 40 TABLET, DELAYED RELEASE ORAL at 06:47

## 2023-03-16 RX ADMIN — MORPHINE SULFATE 7.5 MG: 15 TABLET ORAL at 00:48

## 2023-03-16 RX ADMIN — BUPROPION HYDROCHLORIDE TABLETS 100 MG: 100 TABLET, FILM COATED ORAL at 09:52

## 2023-03-16 RX ADMIN — METOPROLOL SUCCINATE 25 MG: 25 TABLET, EXTENDED RELEASE ORAL at 09:52

## 2023-03-16 RX ADMIN — ATORVASTATIN CALCIUM 40 MG: 40 TABLET, FILM COATED ORAL at 18:22

## 2023-03-16 RX ADMIN — ONDANSETRON 4 MG: 4 TABLET, ORALLY DISINTEGRATING ORAL at 08:12

## 2023-03-16 RX ADMIN — METOPROLOL SUCCINATE 25 MG: 25 TABLET, EXTENDED RELEASE ORAL at 18:22

## 2023-03-16 RX ADMIN — DULOXETINE HYDROCHLORIDE 60 MG: 60 CAPSULE, DELAYED RELEASE ORAL at 21:57

## 2023-03-16 RX ADMIN — LIDOCAINE 5% 1 PATCH: 700 PATCH TOPICAL at 09:52

## 2023-03-16 RX ADMIN — DULOXETINE HYDROCHLORIDE 60 MG: 60 CAPSULE, DELAYED RELEASE ORAL at 09:51

## 2023-03-16 RX ADMIN — APIXABAN 5 MG: 5 TABLET, FILM COATED ORAL at 09:51

## 2023-03-16 RX ADMIN — MORPHINE SULFATE 7.5 MG: 15 TABLET ORAL at 22:05

## 2023-03-16 RX ADMIN — CINACALCET 30 MG: 30 TABLET, FILM COATED ORAL at 06:47

## 2023-03-16 RX ADMIN — HYDROXYCHLOROQUINE SULFATE 200 MG: 200 TABLET ORAL at 06:47

## 2023-03-16 RX ADMIN — APIXABAN 5 MG: 5 TABLET, FILM COATED ORAL at 18:23

## 2023-03-16 RX ADMIN — GABAPENTIN 100 MG: 100 CAPSULE ORAL at 18:22

## 2023-03-16 RX ADMIN — MELATONIN 3 MG: at 21:57

## 2023-03-16 RX ADMIN — ALUMINUM HYDROXIDE, MAGNESIUM HYDROXIDE, AND DIMETHICONE 30 ML: 200; 20; 200 SUSPENSION ORAL at 15:16

## 2023-03-16 RX ADMIN — LAMOTRIGINE 150 MG: 100 TABLET ORAL at 21:57

## 2023-03-16 NOTE — PROGRESS NOTES
PM&R PROGRESS NOTE:  Leonel Carrillo 75 y o  female MRN: [id number]  Unit/Bed#: -01 Encounter: [id number]      Rehabilitation Diagnosis: Impairment of mobility, safety, Activities of Daily Living (ADLs), and cognitive/communication skills due to Stroke:  01 1  Left Body Involvement (Right Brain)    HPI: Leonel Carrillo is a 75 y o  female with history of asthma, anxiety, bipolar depression, chronic narcotic use, previous enteroviral infection of the heart, seronegative arthropathy of multiple joints, undifferentiated connective tissue disorder, obstructive sleep apnea on home CPAP who presented to the [facility] on 2/22 for altered mental status  According to the patient's daughter on chart review, the patient's mental status had been declining for the prior 48 hours to admission  She did receive Narcan which improved her mental status slightly  Of note she had a recent stroke with revascularization on 2/12/2023 with Dr Bacon Fresh  She was seen by neurology with overall good strength except mildly weak in the lower extremities with brisk reflexes on the left and a positive Haroon's  At this time is felt to be potentially related to polypharmacy in the setting of chronic pain meds versus seizure in the setting of a recent stroke  She was initiated on 81 mg of aspirin daily with a plan of potentially transitioning to Eliquis, plan for EEG  CT of the head was negative  MRI was completed showing a new small acute/recent interval lacunar infarction in the right cerebral pedun there is jacinta  She was started on Eliquis  Nephrology was consulted as well for hypercalcemia  The patient was evaluated by the Rehabilitation team and deemed an appropriate candidate for comprehensive inpatient rehabilitation and admitted to the [facility] on 2/28/2023  3:41 PM patient was seen in room with brother at bedside and all questions regarding the acute inpatient rehabilitation program were answered  We also discussed at length course in the hospital and her last hospitalization to verify events and clarify any imaging or laboratory findings  Patient is eager to start her therapy program       SUBJECTIVE: Patient seen and evaluated at bedside  No acute events overnight  She did use her call bell to use the restroom at approximately 0400 and otherwise has been doing well  Her arousal level and participation continued to be good without levels of confusion, hallucination  Elissa the case in stand up meeting and plan to continue the one-to-one observation overnight from 7P to 7-day and if continues to do well we will discontinue the continuous observation  Patient denies any fever, chills, nausea, vomiting, cough, shortness of breath, diarrhea, constipation  Pain is relatively controlled  Review of the [facility] shows that on average she is using 7 5 mg of immediate release morphine per day for the last 3 days usually either in the morning or overnight  occasionally getting 2 doses per day  ASSESSMENT: Stable, progressing    PLAN:    Rehabilitation  • Functional deficits:  Self care and mobility  • Continue current rehabilitation plan of care to maximize function  • Functional update:   Physical Therapy Occupational Therapy Speech Therapy   Weight Bearing Status: Full Weight Bearing  Transfers: Minimal Assistance  Bed Mobility: Minimal Assistance  Amulation Distance (ft): 100 feet (rolling walker)  Ambulation: Minimal Assistance  Assistive Device for Ambulation: Roller Walker  Wheelchair Mobility Distance: 100 ft  Wheelchair Mobility: Supervision  Number of Stairs: 6  Assistive Device for Stairs: Bilateral Hand Rails  Stair Assistance: Moderate Assistance  Discharge Recommendations: Skilled Nursing Facility   Eating: Supervision  Grooming: Minimal Assistance  Bathing: Moderate Assistance  Bathing: Moderate Assistance  Upper Body Dressing:  Moderate Assistance  Lower Body Dressing: Minimal Assistance  Toileting: Moderate Assistance  Toilet Transfer: Moderate Assistance  Cognition: Exceptions to WNL  Cognition: Decreased Memory, Impulsive, Decreased Executive Functions, Decreased Attention, Decreased Comprehension, Decreased Safety  Orientation: Person, Place, Time, Situation   Mode of Communication: Verbal  Speech/Language: Dysarthia  Cognition: Exceptions to WNL  Cognition: Decreased Memory, Decreased Executive Functions, Decreased Attention, Decreased Comprehension, Decreased Safety, Impulsive  Orientation: Person, Place, Situation (fluctuates with times of increased confusion and hallucinations)  Swallowing: Exceptions to WNL  Swallowing: Oral Dysphagia, Pharyngeal Dysphagia, Esophageal Dysphagia, Aspiration Risk, Reflux Precautions  Diet Recommendations: Level 1/Puree, [place] Thick  Discharge Recommendations: Home with:  [address] with[de-identified] 24 Hour Supervision, 24 Hour Assisteance, Family Support, Home Speech Therapy, Outpatient Speech Therapy       • Estimated Discharge: Transition to subacute rehabilitation facility when off of the one-to-one continuous observation and stable from a medical and behavioral standpoint  DVT prophylaxis  • On Eliquis     Pain  • Cymbalta 60 mg twice daily  • Gabapentin 100 mg twice daily  • Morphine 7 5 mg 4 times a day as needed  • Acetaminophen 650 mg every 6 hours as needed     Bladder plan  • Continent     Bowel plan  • Continent  • Last bowel movement/16     Code Status  • Level 1 full code      * Acute CVA (cerebrovascular accident) [facility])  Assessment & Plan  3/8 - neuro exam stable - continue to monitor closely   CT head 3/7 - Evolving right MCA territory infarcts with associated petechial hemorrhage which is best seen on the recent MRI examination  There is no discrete parenchymal hematoma  - discussed above with johny Hull - no acute bleeding noted   MRI brain 2/22 - 1    New small acute /more recent interval lacunar infarction in the right cerebral peduncle (series 4, image 14)  2   Extensive multifocal large, previously present right middle cerebral artery infarction which is evolving with superimposed areas of evolving hemorrhage likely related to hemorrhagic transformation of subacute right MCA infarction  No significant   mass effect  3   Mild, chronic microangiopathy  S/P thrombectomy 2/12 by Dr Leonarda Burden   Currently on a dysphagia 1 diet with thin liquids  Secondary stroke prophylaxis at this time with Eliquis 5 mg twice daily (aspirin d/c'd earlier); and statin; optimal BP control   PT, OT, SLP  Consideration for neuropsychology if patient agreeable  Review modifiable risk factors and provide stroke education  Monitor for poststroke pain  Close neuro follow-up as patient has had multiple strokes in the last 2 months    Hypokalemia  Assessment & Plan  Hypokalemia with potassium down to 3 4 given 40 mill equivalents today on 3/10 but IV infiltrated  Able to get PO dose over weekend  Potasium now WNL  Plan to recheck BMP and CBC tomorrow    Wound of left ankle  Assessment & Plan  On lateral ankle possibly occurred at time of fall, reviewed wound getting SilvaSorb examined today on 3/15 with nursing  - Wound care c/s  1  Cleanse L lateral ankle wound with NSS, pat dry, and apply silvasorb gel to the wound bed  Cover with Allevyn foam dressing  Isacc dressing with T  Change every day and as needed for soilage/dislodgement  2  Preventative nursing skin care orders placed  3   Pressure relief, turning and repositioning with foam offloading wedges and waffle cushion to the care  Prevalon boot     Dysphagia  Assessment & Plan  - Current diet: Modified - D1, NTL after failed VBS, GI consulted, plan for esophogram, protonix added  - Recommend VBS study-VBS completed on 3/9 with evidence for esophageal dysmotility and dysphagia recommending puréed with nectar thick, okay for ice chips  - SLP evaluate and treat decline in swallowing   - Aspiration precautions; HOB completely upright when eating, 100% supervision, monitor for pocketing  - Hold oral intake if patient too confused or lethargic and notify MD   - Oral care with meals and bedtime  - Ensure adequate hydration  - Nutrition consult to assist with management   - Rehab nursing and staff to ensure safe and appropriate feeding to decrease risk of aspiration     - Rehab physician oversight dysphagia management and risks associated with it  - Monitor for appropriate fluid and nutrition intake      Gout of foot  Assessment & Plan  Much improved on prednisone  Uric acid wnl   R 1st MTPJ redness and focal tenderness without significant edema or skin breakdown   - IM feels most c/w gout and have ordered prednisone - taper thru 3/12  - Monitor for improvement or non-improvement and other etiologies as well closely     AMS (altered mental status)  Assessment & Plan  Mentation improving as of 3/13 and more consistently after reduction in opioids as well as addition of Sensipar decreasing her overall calcium levels  3/7 Patient with some sleepiness at times apparently last several days with poor activity tolerance and now s/p fall - appears back to recent baseline on follow-up eval   CT head 3/7 - Evolving right MCA territory infarcts with associated petechial hemorrhage which is best seen on the recent MRI examination  There is no discrete parenchymal hematoma   - discussed above with radiologist Della - no acute bleeding noted   - Ensure adequate nutrition and hydration with altered diet and sub-optimal intake BUN/Cr acceptable   - Continue Eliquis, monitor vitals scout  - Comgmt with IM team   - Patient afebrile; CBC wnl; LFTs wnl; Ammonia wnl  - Continue Abx of UTI   - Calcium elevated > optimal mgmt per endo > adding Sensipar   - Hx of SHAR - continue 2L at night O2 with spot checks - refusing CPAP - obtain noct ox on 2L   - Hold standing long-acting MS Contin 15mg Q12H and transitioned to MS IR 7 5mg Q6H PRN   - Gabapentin 100mg BID weaned down recently            UTI (urinary tract infection)  Assessment & Plan  UA last week with culture growing greater than 100,000 CFU Morganella and Serratia  Started on Ciprofloxacin but transitioned to Cefpodixime per prior providers with plan to continue into 3/10   Dysuria improving     History of stroke  Assessment & Plan  Patient with recent admission in early 2023 for right MCA CVA with petechial hemorrhage and residual left-sided weakness and dysarthria  Patient had mechanical thrombectomy with Dr Romina Baumann on 2/12    Obstructive sleep apnea  Assessment & Plan  Patient refusing CPAP 2/2 claustrophobia - refuses home or hospital CPAP   2L O2 at night with spot checks   Obtain noct ox on 2L; repeat noct ox prior to d/c for possible home DME     History of bacteremia  Assessment & Plan  Unclear etiology initially of whether her altered mental status was related to stroke versus polypharmacy versus opioid overdose versus infection versus seizure  1/2 micrococcus with a second set staph coag negative and followed by infectious disease after course of antibiotic treatment  Has been off of antibiotics and repeat blood cultures negative to date, ID felt may be contaminant    Discoloration of skin of foot  Assessment & Plan  N/V intact - no significant discoloration today   Per daughter and patient patient's foot will become blue or red and change colors frequently  No evidence for infectious etiology and had a lower extremity arterial duplex with no evidence of any significant arterial occlusive disease  May be related to the seronegative spondyloarthropathies  Likely the source of of the inaccurate and inconsistent pulse oximetry readings in the hands as well    Hypercalcemia  Assessment & Plan  Nephrology had been following, nonsuppressed PTH with concerns for primary hyperparathyroidism - they defer to endo on mgmt   C spine XR 3/7 - Salt-and-pepper appearance of the skull is partially accentuated "by the image processing /sharpening algorithm, though prior head CT suggests this is a true finding  Correlate for clinical/laboratory features of hyperparathyroidism; Salt-and-pepper appearance of the skull accentuated by image processing/sharpening algorithm  However, prior head CT shows a mottled appearance of the skull as well suggesting hyperparathyroidism  No focal lytic or blastic lesions  No acute fractures  Endocrine consult   \"SPEP-no monoclonal bands, UPEP- no monoclonal bands, PTH RP less than 2, 25-hydroxy vitamin D 41 7, 1,25 hydroxy vitamin D 23 7 low, Phos 2 5 low, PTH 65 4   Assessment/Plan:  1  Hypercalcemia: Suspected to be on the basis of primary hyperparathyroidism: Corrected calcium continues to increase slightly  Sensipar 30 mg daily was started and is being tolerated  Given history of osteoporosis, if appropriate surgical candidate consider surgery referral as outpatient  Follow-up with endocrinology as outpatient  Monitor corrected calcium level  Adjust dose as needed  Status post a CT of the chest abdomen and pelvis with no overt malignancy  - Patient has allergy to contrast - discussed with endo fellow  -Calcium on 3/10 is at 10 2 and down to 9 5 on 3/13 and down to 9 1 on 3/16  Sensipar is working well      HTN (hypertension)  Assessment & Plan  Patient with some orthostasis  IM consulted and with overall management at their discretion during ARC course  Monitor orthostatics; PRN ADDY hose, abdominal binder - ensure adequate hydration   Currently on metoprolol succinate 25 mg twice daily with holding parameters   Plan per cardiology did not add an ARB in the setting of heart failure however as blood pressure allows    Bipolar depression ([address] )  Assessment & Plan  Currently on Wellbutrin 100 mg twice daily, Lamictal 150 mg nightly, and Cymbalta 60 mg twice daily  Consistent with home regimen  Continue monitoring for stability, mood lability    Consider neuropsychology consultation " "if patient agreeable    Fall  Assessment & Plan  3/7 early morning - per report \"unwitnessed FALL; Per RN patient set off bed alarm, but has already fallen onto her left side  Confused and wanting to wash her face  Once more awake, mentation improved  \"  - CTH evolving infarct without new bleeding per rads  - L knee mild pain but good ROM without significant edema or instability > improving - XR unremarkable - monitor   - Mild L hip pain but good ROM and can adequately WB > improving - L hip/pelvis XR - mild hip OA without acute findings -  monitor  - Some L shoulder pain - some mildly decreased AROM, +Muñoz > stable -  L shoulder XR - unremarkable - monitor    - Subtle neck pain but full ROM without radicular complaints or focal weakness, sensory changes > improving - C spine XR - no acute findings or change from prior - chronic changes > Chronic hyperkyphosis  Anterolisthesis at C4-5 is unchanged  No acute subluxation; Salt-and-pepper appearance of the skull accentuated by image processing/sharpening algorithm  Multilevel moderate to severe discogenic and uncovertebral degenerative changes greatest at C5-6 and C6-7  Multilevel mild facet degenerative changes greatest at C4-5  > monitor   - Optimal mgmt with wakefulness/AMS - continue 2L at night O2 with spot checks - refusing CPAP  - Continue 1:1 for safety 7 PM to 7 AM and if continues to do well tonight we will discontinue observation altogether    Cervical spondylosis  Assessment & Plan  C spine XR 3/7 - Chronic hyperkyphosis  Anterolisthesis at C4-5 is unchanged  No acute subluxation; Salt-and-pepper appearance of the skull accentuated by image processing/sharpening algorithm  However, prior head CT shows a mottled appearance of the skull as well suggesting hyperparathyroidism  No focal lytic or blastic lesions  No acute fractures  Multilevel moderate to severe discogenic and uncovertebral degenerative changes greatest at C5-6 and C6-7    Multilevel " mild facet degenerative changes greatest at C4-5   - No signs of myelopathy or radiculopathy - monitor     Osteoporosis  Assessment & Plan  History of an L1 compression fracture status post kyphoplasty  Was previously on Prolia has not been on since 2020  Outpatient referral to endocrine    CHF (congestive heart failure) ([facility] [address] )  Assessment & Plan  Wt Readings from Last 3 Encounters:   03/15/23 59 2 kg (130 lb 8 2 oz)   02/16/23 62 6 kg (138 lb)   02/12/23 67 9 kg (149 lb 11 1 oz)       Cardiomyopathy with reduced ejection fraction as low as 10 to 15% back in 2004 but has improved since and most recently was 25 to 30% on last admission  CXR 3/7 unremarkable; does not appear volume overloaded   Patient was offered LifeVest but declined  On metoprolol succinate 25 mg twice daily, with plan to add an ARB as her blood pressure would allow  Strict I's and O's and daily weights  Follows with Dr Luke Ray at [facility]      Seronegative arthropathy of multiple sites [facility])  [address] with rheumatology and is on Plaquenil 200 mg Monday through Friday and 400 mg on Saturday Sunday  Continue home regimen and follow-up with rheumatology on discharge    Asthma  Assessment & Plan  3/8 saturating well on RA/2L   3/7 afternoon - patient had questionable desat with therapy but had full improved mentation and was in no resp or other distress at that time  - Comgmt with IM who also evaluated patient at this time    - She historically has had difficulty obtaining O2 from finger tips at times - when pulse ox moved to toe she had good oxygenation on RA-2L  - CXR no acute findings - bibasilar scarring   - Continue use of albuterol inhaler 2 puffs every 6 hours as needed    Chronic pain syndrome  Assessment & Plan  History of chronic pain secondary to compression fractures in her thoracic spine and chronic rib fractures   Patient with over sleepiness at times at sedating meds decreased some recently   · Holding standing "long-acting MS Contin 15mg Q12H and transitioned to MS IR 7 5mg Q6H PRN > pain still adequately controlled > monitor closely   · Gabapentin 100mg BID   · Cymbalta 60 milligrams twice daily  · Acetaminophen 650 mg every 6 hours as needed  · Was previously on oxycodone as needed at [facility] but per family due to concerns for polypharmacy and additional opioids beyond her morphine has not been on an acute care since 2/16  · Lidoderm and Bengay  Aqua K also ordered with instructions not to place over same area  Appreciate IM consultants medical co-management  Labs, medications, and imaging personally reviewed  ROS:  A ten point review of systems was completed on 03/16/23 and pertinent positives are listed in subjective section  All other systems reviewed were negative  OBJECTIVE:   /60 (BP Location: Right arm)   Pulse 82   Temp 97 6 °F (36 4 °C) (Oral)   Resp 18   Ht 5' 3\" (1 6 m)   Wt 59 2 kg (130 lb 8 2 oz)   SpO2 97%   BMI 23 12 kg/m²     Physical Exam  Vitals and nursing note reviewed  Constitutional:       General: She is not in acute distress  HENT:      Head: Atraumatic  Comments: Left-sided facial droop     Right Ear: External ear normal       Left Ear: External ear normal       Nose: Nose normal  No rhinorrhea  Mouth/Throat:      Mouth: Mucous membranes are moist       Pharynx: Oropharynx is clear  Eyes:      General: No scleral icterus  Cardiovascular:      Rate and Rhythm: Normal rate  Pulses: Normal pulses  Heart sounds: Normal heart sounds  Pulmonary:      Effort: Pulmonary effort is normal  No respiratory distress  Breath sounds: Normal breath sounds  Abdominal:      General: There is no distension  Palpations: Abdomen is soft  Musculoskeletal:      Right lower leg: No edema  Left lower leg: No edema  Skin:     General: Skin is warm and dry        Comments: Lateral heel wound with SilvaSorb not examined today as " they already wrapped with Allevyn   Neurological:      Mental Status: She is oriented to person, place, and time        Comments: Left hemiparesis and dysarthria, left-sided neglect   Psychiatric:         Mood and Affect: Mood normal          Behavior: Behavior normal           Lab Results   Component Value Date    WBC 7 25 03/13/2023    HGB 12 2 03/13/2023    HCT 38 8 03/13/2023     (H) 03/13/2023     03/13/2023     Lab Results   Component Value Date    SODIUM 141 03/16/2023    K 3 8 03/16/2023     (H) 03/16/2023    CO2 28 03/16/2023    BUN 16 03/16/2023    CREATININE 0 91 03/16/2023    GLUC 104 03/16/2023    CALCIUM 9 1 03/16/2023     Lab Results   Component Value Date    INR 0 95 02/22/2023    INR 0 90 02/12/2023    INR 1 12 04/22/2022    PROTIME 12 8 02/22/2023    PROTIME 12 1 02/12/2023    PROTIME 14 0 04/22/2022           Current Facility-Administered Medications:   •  acetaminophen (TYLENOL) tablet 650 mg, 650 mg, Oral, Q6H PRN, Sutton Wilton, DO, 650 mg at 03/14/23 1853  •  albuterol (PROVENTIL HFA,VENTOLIN HFA) inhaler 2 puff, 2 puff, Inhalation, Q6H PRN, Sutton Wilton, DO, 2 puff at 03/07/23 1358  •  aluminum-magnesium hydroxide-simethicone (MYLANTA) oral suspension 30 mL, 30 mL, Oral, Q4H PRN, Sutton Wilton, DO, 30 mL at 03/15/23 1017  •  apixaban (ELIQUIS) tablet 5 mg, 5 mg, Oral, BID, Sutton Wilton, DO, 5 mg at 03/16/23 4021  •  atorvastatin (LIPITOR) tablet 40 mg, 40 mg, Oral, QPM, Sutton Wilton, DO, 40 mg at 03/15/23 1805  •  bisacodyl (DULCOLAX) rectal suppository 10 mg, 10 mg, Rectal, Once, Michele Roman MD  •  buPROPion [facility]) tablet 100 mg, 100 mg, Oral, BID, Kat Núñez DO, 100 mg at 03/16/23 8454  •  cinacalcet (SENSIPAR) tablet 30 mg, 30 mg, Oral, Daily With Breakfast, Anika Moess MD, 30 mg at 03/16/23 1812  •  DULoxetine (CYMBALTA) delayed release capsule 60 mg, 60 mg, Oral, BID, Kat Núñez DO, 60 mg at 03/16/23 8174  •  gabapentin (NEURONTIN) capsule 100 mg, 100 mg, Oral, BID, Ryne Caruso MD, 100 mg at 03/16/23 2983  •  glycerin-hypromellose- (ARTIFICIAL TEARS) ophthalmic solution 1 drop, 1 drop, Both Eyes, Q4H PRN, Ning Reagin, DO, 1 drop at 03/14/23 2030  •  hydroxychloroquine (PLAQUENIL) tablet 200 mg, 200 mg, Oral, Once per day on Mon Tue Wed Thu Fri, Ning Reagin, DO, 200 mg at 03/16/23 4823  •  hydroxychloroquine (PLAQUENIL) tablet 400 mg, 400 mg, Oral, Once per day on Sun Sat, Carloz DELEON Parnell, DO, 400 mg at 03/12/23 3041  •  lamoTRIgine (LaMICtal) tablet 150 mg, 150 mg, Oral, HS, Ning Reagin, DO, 150 mg at 03/15/23 2109  •  lidocaine (LIDODERM) 5 % patch 1 patch, 1 patch, Topical, Daily, Severino Gates MD, 1 patch at 03/16/23 5310  •  LORazepam (ATIVAN) tablet 0 25 mg, 0 25 mg, Oral, BID PRN, Ryne Caruso MD, 0 25 mg at 03/11/23 1054  •  melatonin tablet 3 mg, 3 mg, Oral, HS, LAURA Rutledge, 3 mg at 03/15/23 2109  •  menthol-methyl salicylate (BENGAY) 39-60 % cream, , Apply externally, 4x Daily PRN, Severino Gates MD, Given at 03/06/23 1227  •  metoprolol succinate (TOPROL-XL) 24 hr tablet 25 mg, 25 mg, Oral, BID, MATTI MyersNP, 25 mg at 03/16/23 0111  •  morphine (MSIR) IR tablet 7 5 mg, 7 5 mg, Oral, Q6H PRN, Ryne Caruso MD, 7 5 mg at 03/16/23 0048  •  ondansetron (ZOFRAN-ODT) dispersible tablet 4 mg, 4 mg, Oral, Q6H PRN, Ning Reagin, DO, 4 mg at 03/16/23 2403  •  pantoprazole (PROTONIX) EC tablet 40 mg, 40 mg, Oral, Early Morning, Zulema Steward MD, 40 mg at 03/16/23 9249  •  polyethylene glycol (MIRALAX) packet 17 g, 17 g, Oral, Daily PRN, Ning Reagin, DO, 17 g at 03/04/23 1930  •  prazosin (MINIPRESS) capsule 1 mg, 1 mg, Oral, HS, Ning Reagin, DO, 1 mg at 03/15/23 2110  •  senna-docusate sodium (SENOKOT S) 8 6-50 mg per tablet 1 tablet, 1 tablet, Oral, BID, Ning Reagin, DO, 1 tablet at 03/16/23 0951  •  sodium chloride (OCEAN) 0 65 % nasal spray 1 spray, 1 spray, Each Nare, Q1H Anjana OCAMPO MD, 1 spray at 03/08/23 0300    Past Medical History:   Diagnosis Date   • Anxiety    • Asthma    • Bipolar depression ([place] [address] )    • Chronic narcotic dependence ([place] [address] )    • Chronic pain    • Depression    • Enterovirus heart infection    • SHAR (obstructive sleep apnea)    • Osteoarthritis    • Peripheral neuropathy    • Plantar fasciitis of right foot    • Senile osteoporosis    • Seronegative arthropathy of multiple sites [facility])    • Undifferentiated connective tissue disease ([place] [address] )        Patient Active Problem List    Diagnosis Date Noted   • Acute CVA (cerebrovascular accident) ([place] [address] ) 02/23/2023   • Hypokalemia 03/10/2023   • AMS (altered mental status) 03/09/2023   • Gout of foot 03/09/2023   • Dysphagia 03/09/2023   • Wound of left ankle 03/09/2023   • UTI (urinary tract infection) 03/06/2023   • Obstructive sleep apnea 02/28/2023   • History of stroke 02/28/2023   • History of bacteremia 02/24/2023   • Hypercalcemia 02/22/2023   • Discoloration of skin of foot 02/22/2023   • Abdominal pain 02/22/2023   • HFrEF (heart failure with reduced ejection fraction) ([place] [address] ) 02/13/2023   • Stroke ([place] [address] ) 02/12/2023   • HTN (hypertension) 02/12/2023   • Cardiomyopathy ([place] [address] ) 02/07/2023   • Vitamin D insufficiency 02/07/2023   • LAILA (acute kidney injury) ([place] [address] ) 04/23/2022   • Polypharmacy 04/23/2022   • 'light-for-dates' infant with signs of fetal malnutrition 04/22/2022   • Bipolar depression ([place] [address] ) 03/04/2022   • Rheumatoid arthritis ([place] [address] ) 03/04/2022   • Closed fracture of multiple ribs of right side 03/02/2022   • Traumatic pneumothorax 03/02/2022   • Closed fracture of transverse process of lumbar vertebra ([place] [address] ) 03/02/2022   • Liver contusion 03/02/2022   • Fall 03/02/2022   • Acute pain due to trauma 03/02/2022   • Right shoulder pain 03/02/2022   • Severe protein-calorie malnutrition ([facility] ) 03/02/2022   • Undifferentiated connective tissue disease ([facility] ) 12/29/2021   • Primary generalized (osteo)arthritis 12/29/2021   • Peripheral neuropathy 12/29/2021   • Osteoporosis 12/29/2021   • Hx of compression fracture of spine 12/29/2021   • Lumbar spondylosis 12/29/2021   • Cervical spondylosis 12/29/2021   • Anemia 06/26/2021   • Chronic pain syndrome    • Asthma    • Ambulatory dysfunction    • Seronegative arthropathy of multiple sites (HCC)    • Gastroesophageal reflux disease without esophagitis    • Mixed stress and urge urinary incontinence    • CHF (congestive heart failure) ([facility] )         Shayne Kwok DO  Physical Medicine and [facility]    I have spent a total time of 35 minutes on 03/16/23 in caring for this patient including Instructions for management, Patient and family education, Counseling / Coordination of care, Documenting in the medical record and Communicating with other healthcare professionals   Additional time spent discussing labs with the patient as well as the plan for the continuous observation weaning and overnight events

## 2023-03-16 NOTE — PROGRESS NOTES
"   03/16/23 1030   Pain Assessment   Pain Assessment Tool 0-10   Pain Score 3   Pain Location/Orientation Orientation: Lower; Location: Back   Restrictions/Precautions   Precautions Aspiration;Bed/chair alarms;Cognitive; Fall Risk;Contact/isolation;Pain;Supervision on toilet/commode;Visual deficit   Weight Bearing Restrictions No   ROM Restrictions No   Lifestyle   Autonomy \"I think 6 is too early to restrict fluids  \"   Lying to Sitting on Side of Bed   Type of Assistance Needed Supervision; Adaptive equipment   Physical Assistance Level No physical assistance   Comment w/ bed rail, inc time   Lying to Sitting on Side of Bed CARE Score 4   Sit to Stand   Type of Assistance Needed Physical assistance   Physical Assistance Level 25% or less   Comment Min A, cues for hand placement   Sit to Stand CARE Score 3   Bed-Chair Transfer   Type of Assistance Needed Adaptive equipment;Verbal cues; Physical assistance   Physical Assistance Level 25% or less   Comment Min A w/ RW, cues for RW management and navigating environmental barriers on L  Chair/Bed-to-Chair Transfer CARE Score 3   Coordination   Gross Motor Engaged in item retrieval focusing on L sustaining spherical grasp from container at midline and releasing item in container to pt's L  Pt initially req vc's to maximize L visual scanning of LUE to ensure grasp, then visual tracking LUE w/ fxnl movement  With task progression, improvement noted w/ grasp pattern w/ inc time provided, task upgraded by moving target during item release to encourage L visual scanning  Pt tolerated well w/ rest breaks PRN to manage fatigue, x14 reps total    Cognition   Overall Cognitive Status Impaired   Activity Tolerance   Medical Staff Made Aware Pt reporting nausea and fatigue at start of session, req inc time to arouse  /62 manually while supine, SpO2 96% HR 96, RN made aware   With inc alertness and position change of sitting EOB, pt reporting nausea resolved an fatigue managed w/ " "frequent rest breaks  Assessment   Treatment Assessment Pt seen for skilled OT session focusing on short distance fxnl mobility w/ RW, fxnl xfers, and LUE NMR  At start of session, pt found sleeping in bed and req inc time to arouse  Pt reported nausea and fatigue, monitored vitals and RN made aware, as pt became more alert and w/ position change pt reported improved nausea  Pt expressing feeling of frustration w/ \"fluid restriction after 6PM\"  Edu on ability to take sips at any time w/ goal to limit fluids in the evening to dec need to toilet overnight  Pt add'lly expressed, \"I wish they talked to me about the plan, the sitter told me last night and I was surprised  \" Extended emotional support and encouragement provided, pt demo G understanding of safety rationale however benefits from frequent education 2* cognitive deficits  Pt expressed, \"I rang my call bell last night, the sitter said I don't have to ring since she was there but I told her I do  \" Encouraged pt to cont using call bell even w/ 1:1 present overnight, pt aware of plan to d/c 1:1 if pt using call bell appropriately overnight  Cont OT POC: endurance work, repetitive safety training, L visual scanning, RW management, fxnl standing tolerance, short distance fxnl mobility w/ RW, LUE NMR, and ADL retraining  Cont to recommend EDWIN at d/c  Pt was left resting in recliner w/ all needs within reach and alarm activated  Prognosis Fair   Problem List Decreased strength;Decreased endurance; Impaired balance;Decreased mobility; Decreased coordination;Decreased cognition; Impaired judgement;Decreased safety awareness;Pain   Plan   Treatment/Interventions ADL retraining;Functional transfer training; Therapeutic exercise; Endurance training;Cognitive reorientation;Patient/family training   Progress Progressing toward goals   Recommendation   OT Discharge Recommendation Post acute rehabilitation services   OT Therapy Minutes   OT Time In 1030   OT Time Out 1130   OT " Total Time (minutes) 60   OT Mode of treatment - Individual (minutes) 60   OT Mode of treatment - Concurrent (minutes) 0   OT Mode of treatment - Group (minutes) 0   OT Mode of treatment - Co-treat (minutes) 0   OT Mode of Treatment - Total time(minutes) 60 minutes   OT Cumulative Minutes 1335   Therapy Time missed   Time missed?  No

## 2023-03-16 NOTE — PLAN OF CARE
Reviewed    Problem: PAIN - ADULT  Goal: Verbalizes/displays adequate comfort level or baseline comfort level  Description: Interventions:  - Encourage patient to monitor pain and request assistance  - Assess pain using appropriate pain scale  - Administer analgesics based on type and severity of pain and evaluate response  - Implement non-pharmacological measures as appropriate and evaluate response  - Consider cultural and social influences on pain and pain management  - Notify physician/advanced practitioner if interventions unsuccessful or patient reports new pain  Outcome: Progressing     Problem: INFECTION - ADULT  Goal: Absence or prevention of progression during hospitalization  Description: INTERVENTIONS:  - Assess and monitor for signs and symptoms of infection  - Monitor lab/diagnostic results  - Monitor all insertion sites, i e  indwelling lines, tubes, and drains  - Monitor endotracheal if appropriate and nasal secretions for changes in amount and color  - [place] appropriate cooling/warming therapies per order  - Administer medications as ordered  - Instruct and encourage patient and family to use good hand hygiene technique  - Identify and instruct in appropriate isolation precautions for identified infection/condition  Outcome: Progressing  Goal: Absence of fever/infection during neutropenic period  Description: INTERVENTIONS:  - Monitor WBC    Outcome: Progressing     Problem: SAFETY ADULT  Goal: Patient will remain free of falls  Description: INTERVENTIONS:  - Educate patient/family on patient safety including physical limitations  - Instruct patient to call for assistance with activity   - Consult OT/PT to assist with strengthening/mobility   - Keep Call bell within reach  - Keep bed low and locked with side rails adjusted as appropriate  - Keep care items and personal belongings within reach  - Initiate and maintain comfort rounds  - Make Fall Risk Sign visible to staff  - Offer Toileting every 4 Hours, in advance of need  - Initiate/Maintain bed and chair alarm  - Apply yellow socks and bracelet for high fall risk patients  - Consider moving patient to room near nurses station  Outcome: Progressing  Goal: Maintain or return to baseline ADL function  Description: INTERVENTIONS:  -  Assess patient's ability to carry out ADLs; assess patient's baseline for ADL function and identify physical deficits which impact ability to perform ADLs (bathing, care of mouth/teeth, toileting, grooming, dressing, etc )  - Assess/evaluate cause of self-care deficits   - Assess range of motion  - Assess patient's mobility; develop plan if impaired  - Assess patient's need for assistive devices and provide as appropriate  - Encourage maximum independence but intervene and supervise when necessary  - Involve family in performance of ADLs  - Assess for home care needs following discharge   - Consider OT consult to assist with ADL evaluation and planning for discharge  - Provide patient education as appropriate  Outcome: Progressing  Goal: Maintains/Returns to pre admission functional level  Description: INTERVENTIONS:  - Perform BMAT or MOVE assessment daily    - Set and communicate daily mobility goal to care team and patient/family/caregiver     - Collaborate with rehabilitation services on mobility goals if consulted  - Out of bed for toileting  - Record patient progress and toleration of activity level   Outcome: Progressing     Problem: DISCHARGE PLANNING  Goal: Discharge to home or other facility with appropriate resources  Description: INTERVENTIONS:  - Identify barriers to discharge w/patient and caregiver  - Arrange for needed discharge resources and transportation as appropriate  - Identify discharge learning needs (meds, wound care, etc )  - Arrange for interpretive services to assist at discharge as needed  - Refer to Case Management Department for coordinating discharge planning if the patient needs post-hospital services based on physician/advanced practitioner order or complex needs related to functional status, cognitive ability, or social support system  Outcome: Progressing     Problem: Nutrition/Hydration-ADULT  Goal: Nutrient/Hydration intake appropriate for improving, restoring or maintaining nutritional needs  Description: Monitor and assess patient's nutrition/hydration status for malnutrition  Collaborate with interdisciplinary team and initiate plan and interventions as ordered  Monitor patient's weight and dietary intake as ordered or per policy  Utilize nutrition screening tool and intervene as necessary  Determine patient's food preferences and provide high-protein, high-caloric foods as appropriate       INTERVENTIONS:  - Monitor oral intake, urinary output, labs, and treatment plans  - Assess nutrition and hydration status and recommend course of action  - Evaluate amount of meals eaten  - Assist patient with eating if necessary   - Allow adequate time for meals  - Recommend/ encourage appropriate diets, oral nutritional supplements, and vitamin/mineral supplements  - Order, calculate, and assess calorie counts as needed  - Recommend, monitor, and adjust tube feedings and TPN/PPN based on assessed needs  - Assess need for intravenous fluids  - Provide specific nutrition/hydration education as appropriate  - Include patient/family/caregiver in decisions related to nutrition  Outcome: Progressing     Problem: Prexisting or High Potential for Compromised Skin Integrity  Goal: Skin integrity is maintained or improved  Description: INTERVENTIONS:  - Identify patients at risk for skin breakdown  - Assess and monitor skin integrity  - Assess and monitor nutrition and hydration status  - Monitor labs   - Assess for incontinence   - Turn and reposition patient  - Assist with mobility/ambulation  - Relieve pressure over bony prominences  - Avoid friction and shearing  - Provide appropriate hygiene as needed including keeping skin clean and dry  - Evaluate need for skin moisturizer/barrier cream  - Collaborate with interdisciplinary team   - Patient/family teaching  - Consider wound care consult   Outcome: Progressing     Problem: MOBILITY - ADULT  Goal: Maintain or return to baseline ADL function  Description: INTERVENTIONS:  -  Assess patient's ability to carry out ADLs; assess patient's baseline for ADL function and identify physical deficits which impact ability to perform ADLs (bathing, care of mouth/teeth, toileting, grooming, dressing, etc )  - Assess/evaluate cause of self-care deficits   - Assess range of motion  - Assess patient's mobility; develop plan if impaired  - Assess patient's need for assistive devices and provide as appropriate  - Encourage maximum independence but intervene and supervise when necessary  - Involve family in performance of ADLs  - Assess for home care needs following discharge   - Consider OT consult to assist with ADL evaluation and planning for discharge  - Provide patient education as appropriate  Outcome: Progressing  Goal: Maintains/Returns to pre admission functional level  Description: INTERVENTIONS:  - Set and communicate daily mobility goal to care team and patient/family/caregiver     - Collaborate with rehabilitation services on mobility goals if consulted  - Out of bed for toileting  - Record patient progress and toleration of activity level   Outcome: Progressing

## 2023-03-16 NOTE — PROGRESS NOTES
"   03/16/23 1400   Pain Assessment   Pain Assessment Tool 0-10   Pain Score 5   Pain Location/Orientation Orientation: Lower; Location: Back   Restrictions/Precautions   Precautions Aspiration;Bed/chair alarms;Cognitive; Fall Risk;Contact/isolation;Supervision on toilet/commode;Visual deficit   Weight Bearing Restrictions No   ROM Restrictions No   Lifestyle   Autonomy \"I want to get back into bed  \"   Sit to Lying   Type of Assistance Needed Supervision; Adaptive equipment   Physical Assistance Level No physical assistance   Comment HOB slightly elevated   Sit to Lying CARE Score 4   Sit to Stand   Type of Assistance Needed Physical assistance   Physical Assistance Level 25% or less   Comment CGA-Min A, vc's for hand placement   Sit to Stand CARE Score 3   Bed-Chair Transfer   Type of Assistance Needed Adaptive equipment;Physical assistance;Verbal cues   Physical Assistance Level 25% or less   Comment w/ RW, cues to inc attention to L environmental barriers  Chair/Bed-to-Chair Transfer CARE Score 3   Toilet Transfer   Type of Assistance Needed Physical assistance; Adaptive equipment;Verbal cues   Physical Assistance Level 25% or less   Comment w/ RW to [facility] over toilet, cues to square off prior to sitting  Toilet Transfer CARE Score 3   ROM - Left Upper Extremities    L Shoulder AROM; Flexion; Extension   L Position Supine   L Weight/Reps/Sets 4x10   L Elbow AROM;Elbow flexion;Elbow extension   LUE ROM Comment AROM to moving target on pt's L to complete shoulder flexion to 90* and elbow flexion w/ cue to touch chin  vc's to slow pace and focus on hand to target to improve coordination  Pt able to visually track LUE during task without cues  Rest breaks between sets to manage LUE fatigue  Cognition   Overall Cognitive Status Impaired   Assessment   Treatment Assessment Pt seen for skilled OT w/ focus on short distance fxnl mobility w/ RW, LUE NMR, and bed mobility   Pt cont to req vc's for sequencing and inc " L-attention to [employer] w/ all fxnl tasks  Cont OT POC: endurance work, repetitive safety training, fxnl standing tolerance, LUE NMR, and ADL retraining  Cont to recommend EDWIN at d/c  Pt was left resting in bed w/ all needs within reach and alarm activated  Prognosis Fair   Problem List Decreased strength;Decreased endurance; Impaired balance;Decreased mobility; Decreased coordination;Decreased cognition; Impaired judgement;Decreased safety awareness;Pain   Plan   Treatment/Interventions ADL retraining;Functional transfer training; Therapeutic exercise; Endurance training;Patient/family training   Progress Progressing toward goals   Recommendation   OT Discharge Recommendation Post acute rehabilitation services   OT Therapy Minutes   OT Time In 1400   OT Time Out 1430   OT Total Time (minutes) 30   OT Mode of treatment - Individual (minutes) 30   OT Mode of treatment - Concurrent (minutes) 0   OT Mode of treatment - Group (minutes) 0   OT Mode of treatment - Co-treat (minutes) 0   OT Mode of Treatment - Total time(minutes) 30 minutes   OT Cumulative Minutes 1365   Therapy Time missed   Time missed?  No

## 2023-03-16 NOTE — TELEPHONE ENCOUNTER
Joni Alonso, DO  You 14 hours ago (5:05 PM)     Lizette Saad,     The brother is not the point contact for the patient during the stay in the hospital   The daughter is the point contact at this time as I cannot update every family member individually  If the family member does not call again they should be recommended to call the hospital and not the office       Franklin Gilbert,    Physical Medicine and [facility]

## 2023-03-16 NOTE — PLAN OF CARE
Problem: PAIN - ADULT  Goal: Verbalizes/displays adequate comfort level or baseline comfort level  Description: Interventions:  - Encourage patient to monitor pain and request assistance  - Assess pain using appropriate pain scale  - Administer analgesics based on type and severity of pain and evaluate response  - Implement non-pharmacological measures as appropriate and evaluate response  - Consider cultural and social influences on pain and pain management  - Notify physician/advanced practitioner if interventions unsuccessful or patient reports new pain  3/16/2023 0017 by Debbie Duckworth RN  Outcome: Progressing  3/15/2023 1045 by Debbie Duckworth RN  Outcome: Progressing     Problem: INFECTION - ADULT  Goal: Absence or prevention of progression during hospitalization  Description: INTERVENTIONS:  - Assess and monitor for signs and symptoms of infection  - Monitor lab/diagnostic results  - Monitor all insertion sites, i e  indwelling lines, tubes, and drains  - Monitor endotracheal if appropriate and nasal secretions for changes in amount and color  - [place] appropriate cooling/warming therapies per order  - Administer medications as ordered  - Instruct and encourage patient and family to use good hand hygiene technique  - Identify and instruct in appropriate isolation precautions for identified infection/condition  3/16/2023 0017 by Debbie Duckworth RN  Outcome: Progressing  3/15/2023 1045 by Debbie Duckworth RN  Outcome: Progressing  Goal: Absence of fever/infection during neutropenic period  Description: INTERVENTIONS:  - Monitor WBC    3/16/2023 0017 by Debbie Duckworth RN  Outcome: Progressing  3/15/2023 1045 by Debbie Duckworth RN  Outcome: Progressing     Problem: SAFETY ADULT  Goal: Patient will remain free of falls  Description: INTERVENTIONS:  - Educate patient/family on patient safety including physical limitations  - Instruct patient to call for assistance with activity   - Consult OT/PT to assist with strengthening/mobility   - Keep Call bell within reach  - Keep bed low and locked with side rails adjusted as appropriate  - Keep care items and personal belongings within reach  - Initiate and maintain comfort rounds  - Make Fall Risk Sign visible to staff  - Offer Toileting every 2 Hours, in advance of need  - Initiate/Maintain bed/chair alarm  - Obtain necessary fall risk management equipment: nonskid socks  - Apply yellow socks and bracelet for high fall risk patients  - Consider moving patient to room near nurses station  3/16/2023 0017 by Darlyn Malik RN  Outcome: Progressing  3/15/2023 1045 by Darlyn Malik RN  Outcome: Progressing  Goal: Maintain or return to baseline ADL function  Description: INTERVENTIONS:  -  Assess patient's ability to carry out ADLs; assess patient's baseline for ADL function and identify physical deficits which impact ability to perform ADLs (bathing, care of mouth/teeth, toileting, grooming, dressing, etc )  - Assess/evaluate cause of self-care deficits   - Assess range of motion  - Assess patient's mobility; develop plan if impaired  - Assess patient's need for assistive devices and provide as appropriate  - Encourage maximum independence but intervene and supervise when necessary  - Involve family in performance of ADLs  - Assess for home care needs following discharge   - Consider OT consult to assist with ADL evaluation and planning for discharge  - Provide patient education as appropriate  3/16/2023 0017 by Darlyn Malik RN  Outcome: Progressing  3/15/2023 1045 by Darlyn Malik RN  Outcome: Progressing  Goal: Maintains/Returns to pre admission functional level  Description: INTERVENTIONS:  - Perform BMAT or MOVE assessment daily    - Set and communicate daily mobility goal to care team and patient/family/caregiver  - Collaborate with rehabilitation services on mobility goals if consulted  - Perform Range of Motion 3 times a day  - Reposition patient every 2 hours    - Dangle patient 3 times a day  - Stand patient 3 times a day  - Ambulate patient 3 times a day  - Out of bed to chair 3 times a day   - Out of bed for meals 3 times a day  - Out of bed for toileting  - Record patient progress and toleration of activity level   3/16/2023 0017 by Ashley Guallpa RN  Outcome: Progressing  3/15/2023 1045 by Ashley Guallpa RN  Outcome: Progressing     Problem: DISCHARGE PLANNING  Goal: Discharge to home or other facility with appropriate resources  Description: INTERVENTIONS:  - Identify barriers to discharge w/patient and caregiver  - Arrange for needed discharge resources and transportation as appropriate  - Identify discharge learning needs (meds, wound care, etc )  - Arrange for interpretive services to assist at discharge as needed  - Refer to Case Management Department for coordinating discharge planning if the patient needs post-hospital services based on physician/advanced practitioner order or complex needs related to functional status, cognitive ability, or social support system  3/16/2023 0017 by Ashley Guallpa RN  Outcome: Progressing  3/15/2023 1045 by Ashley Guallpa RN  Outcome: Progressing     Problem: Nutrition/Hydration-ADULT  Goal: Nutrient/Hydration intake appropriate for improving, restoring or maintaining nutritional needs  Description: Monitor and assess patient's nutrition/hydration status for malnutrition  Collaborate with interdisciplinary team and initiate plan and interventions as ordered  Monitor patient's weight and dietary intake as ordered or per policy  Utilize nutrition screening tool and intervene as necessary  Determine patient's food preferences and provide high-protein, high-caloric foods as appropriate       INTERVENTIONS:  - Monitor oral intake, urinary output, labs, and treatment plans  - Assess nutrition and hydration status and recommend course of action  - Evaluate amount of meals eaten  - Assist patient with eating if necessary   - Allow adequate time for meals  - Recommend/ encourage appropriate diets, oral nutritional supplements, and vitamin/mineral supplements  - Order, calculate, and assess calorie counts as needed  - Recommend, monitor, and adjust tube feedings and TPN/PPN based on assessed needs  - Assess need for intravenous fluids  - Provide specific nutrition/hydration education as appropriate  - Include patient/family/caregiver in decisions related to nutrition  3/16/2023 0017 by Alessia Mancilla RN  Outcome: Progressing  3/15/2023 1045 by Alessia Mancilla RN  Outcome: Progressing     Problem: Prexisting or High Potential for Compromised Skin Integrity  Goal: Skin integrity is maintained or improved  Description: INTERVENTIONS:  - Identify patients at risk for skin breakdown  - Assess and monitor skin integrity  - Assess and monitor nutrition and hydration status  - Monitor labs   - Assess for incontinence   - Turn and reposition patient  - Assist with mobility/ambulation  - Relieve pressure over bony prominences  - Avoid friction and shearing  - Provide appropriate hygiene as needed including keeping skin clean and dry  - Evaluate need for skin moisturizer/barrier cream  - Collaborate with interdisciplinary team   - Patient/family teaching  - Consider wound care consult   3/16/2023 0017 by Alessia Mancilla RN  Outcome: Progressing  3/15/2023 1045 by Alessia Mancilla RN  Outcome: Progressing     Problem: MOBILITY - ADULT  Goal: Maintain or return to baseline ADL function  Description: INTERVENTIONS:  -  Assess patient's ability to carry out ADLs; assess patient's baseline for ADL function and identify physical deficits which impact ability to perform ADLs (bathing, care of mouth/teeth, toileting, grooming, dressing, etc )  - Assess/evaluate cause of self-care deficits   - Assess range of motion  - Assess patient's mobility; develop plan if impaired  - Assess patient's need for assistive devices and provide as appropriate  - Encourage maximum independence but intervene and supervise when necessary  - Involve family in performance of ADLs  - Assess for home care needs following discharge   - Consider OT consult to assist with ADL evaluation and planning for discharge  - Provide patient education as appropriate  3/16/2023 0017 by Quincy Krause RN  Outcome: Progressing  3/15/2023 1045 by Quincy Krause RN  Outcome: Progressing  Goal: Maintains/Returns to pre admission functional level  Description: INTERVENTIONS:  - Perform BMAT or MOVE assessment daily    - Set and communicate daily mobility goal to care team and patient/family/caregiver  - Collaborate with rehabilitation services on mobility goals if consulted  - Perform Range of Motion 3 times a day  - Reposition patient every 2 hours    - Dangle patient 3 times a day  - Stand patient 3 times a day  - Ambulate patient 3 times a day  - Out of bed to chair 3 times a day   - Out of bed for meals 3 times a day  - Out of bed for toileting  - Record patient progress and toleration of activity level   3/16/2023 0017 by Quincy Krause RN  Outcome: Progressing  3/15/2023 1045 by Quincy Krause RN  Outcome: Progressing

## 2023-03-16 NOTE — PROGRESS NOTES
03/16/23 0830   Pain Assessment   Pain Assessment Tool 0-10   Pain Score 4   Pain Location/Orientation Orientation: Lower; Location: Back   Pain Onset/Description Onset: Ongoing;Frequency: Intermittent   Patient's Stated Pain Goal No pain   Hospital Pain Intervention(s) Rest   Restrictions/Precautions   Precautions Aspiration;Bed/chair alarms;Cognitive; Fall Risk;Contact/isolation;Pain;Supervision on toilet/commode;Visual deficit   Weight Bearing Restrictions No   ROM Restrictions No   General   Change In Medical/Functional Status Pt's board in room updated to amb with RW and MODA to/from bathroom  In evenings or with fatigue pt is to cont SPT with R HHA  Cognition   Overall Cognitive Status Impaired   Arousal/Participation Alert; Cooperative   Attention Attends with cues to redirect   Orientation Level Oriented to person;Oriented to place   Memory Decreased short term memory   Following Commands Follows one step commands without difficulty   Sit to Lying   Type of Assistance Needed Supervision; Adaptive equipment   Comment HOB slightly elevated   Sit to Lying CARE Score 4   Lying to Sitting on Side of Bed   Type of Assistance Needed Supervision; Adaptive equipment   Comment HOB slightly elevated   Lying to Sitting on Side of Bed CARE Score 4   Sit to Stand   Type of Assistance Needed Incidental touching;Physical assistance   Physical Assistance Level 25% or less   Comment CGA/DAVID, VC's for hand placement especially L hand on RW   Sit to Stand CARE Score 3   Bed-Chair Transfer   Type of Assistance Needed Physical assistance;Verbal cues; Adaptive equipment   Physical Assistance Level 25% or less   Comment with RW   Chair/Bed-to-Chair Transfer CARE Score 3   Transfer Bed/Chair/Wheelchair   Adaptive Equipment Roller Walker   Walk 10 Feet   Type of Assistance Needed Physical assistance;Verbal cues; Adaptive equipment   Physical Assistance Level 25% or less   Comment with RW, inreased VC's to maintain L foot in RW and to look L in tight areas  Walk 10 Feet CARE Score 3   Walk 50 Feet with Two Turns   Type of Assistance Needed Physical assistance;Verbal cues; Adaptive equipment   Physical Assistance Level 25% or less   Comment with RW, inreased VC's to maintain L foot in RW and to look L in tight areas  Walk 50 Feet with Two Turns CARE Score 3   Walking 10 Feet on Uneven Surfaces   Comment reassess next session   Ambulation   Primary Mode of Locomotion Prior to Admission Walk   Distance Walked (feet) 50 ft  (x4)   Assist Device Roller Walker   Gait Pattern Inconsistant Amy;Decreased foot clearance; Forward Flexion;Narrow AYLA;Shuffle;Step to; Improper weight shift   Limitations Noted In Balance; Coordination;Device Management; Heel Strike;Midline Orientation; Safety;Speed;Strength;Swing; Other  (L inattention)   Provided Assistance with: Balance;Direction   Walk Assist Level Minimum Assist   Does the patient walk? 2  Yes   Wheelchair mobility   Does the patient use a wheelchair? 1  Yes   Toilet Transfer   Type of Assistance Needed Physical assistance;Verbal cues; Adaptive equipment   Physical Assistance Level 25% or less   Comment with RW   Toilet Transfer CARE Score 3   Therapeutic Interventions   Strengthening HEP including seated LAQ, hip flex, hip ADD vs ball, hip ABD and ankle DF/PF 3 x10 reps 2# used on BLE   Neuromuscular Re-Education amb on unit and given certain colored post its and obects when amb to look for and identify  VC's at times to look further L  Amb x50' x2 with head turns L/R/up/down  Pt noted slight dizziness with looking up and amb, it subsided within 30 sec  Other Pt drank 4 oz of nector water and 3 oz of juice from breakfast during session  Assessment   Treatment Assessment Pt participated in skilled PT session with increased focus on balance, gait, increased safety awareness, coordination, L side attention and functional transfers   Pt noted improved ability to maintain L  on RW but cont to require VC's to look at hand and place it properly  Pt does require VC's to focus on task at times but is easily redirected  Pt will cont to benefit from cont gait with RW, increased balance, improved coordination, increased stair management and L attention  Problem List Decreased strength;Decreased endurance; Impaired balance;Decreased mobility; Decreased coordination;Decreased cognition; Impaired judgement;Decreased safety awareness;Pain   Barriers to Discharge Inaccessible home environment;Decreased caregiver support   PT Barriers   Functional Limitation Ramp negotiation;Stair negotiation;Car transfers;Standing;Transfers; Walking   Plan   Treatment/Interventions Functional transfer training;LE strengthening/ROM; Endurance training; Therapeutic exercise;Patient/family training;Cognitive reorientation;Gait training   Progress Progressing toward goals   Recommendation   PT Discharge Recommendation Post acute rehabilitation services   Equipment Recommended Walker   PT Therapy Minutes   PT Time In 0830   PT Time Out 1000   PT Total Time (minutes) 90   PT Mode of treatment - Individual (minutes) 90   PT Mode of treatment - Concurrent (minutes) 0   PT Mode of treatment - Group (minutes) 0   PT Mode of treatment - Co-treat (minutes) 0   PT Mode of Treatment - Total time(minutes) 90 minutes   PT Cumulative Minutes 1068   Therapy Time missed   Time missed?  No

## 2023-03-16 NOTE — PROGRESS NOTES
[facility] DAILY TREATMENT NOTE    03/16/23 1215   Pain Assessment   Pain Assessment Tool 0-10   Pain Score No Pain   Restrictions/Precautions   Precautions Aspiration;Bed/chair alarms;Cognitive; Fall Risk;Contact/isolation;Supervision on toilet/commode   Comprehension   Comprehension (FIM) 4 - Understands basic info/conversation 75-90% of time   Expression   Expression (FIM) 4 - Expresses basic info/needs 75-90% of time   Social Interaction   Social Interaction (FIM) 5 - Interacts appropriately with others 90% of time   Problem Solving   Problem solving (FIM) 4 - Solves basic problems 75-89% of time   Memory   Memory (FIM) 4 - Recognizes/recalls/performs 75-89%   Memory Skills   Orientation Level Oriented X4   Speech/Language/Cognition Assessmetn   Treatment Assessment 4 step reading comprehension sequencing completed @ 90% yessica  No word finding difficulty observed across conversation  Pt able to follow directions during oral care 100% of the time  Verbal cues needed for swallow strategies and exercises  Swallow Information   Current Diet Dysphagia pureed; Nectar thick liquid   Consistencies Assessed and Performance   Materials Admnistered Puree/Level 1;Nectar thick liquid  (lunch tray)   Materials Adminstered Comment Pt required assistance with meal set up; however, able to feed herself independently  Meal tray completed OOB upright in chair  Reviewed swallow strategies prior to starting meal  She did require frequent verbal reminders initially for alternating bites/sips and cough to clear; however, as trials progressed there was some independent carryover and cueing was decreased to occasional verbal cue  Pt tolerated lunch tray well without any clinical overt s s of aspiration  Following lunch tray, oral care completed followed by x6 small single ice chips  Reminders needed for 1 at a time  She tolerated ice chips well without s s of aspiration  Pt also completed x20 effortful swallows   RN informed that oral care was completed  Recommendations   Risk for Aspiration   (Oral care with suction w/ after ALL meals  Once oral care was completed, pt is allowed ICE CHIPS between meals  However, pt MUST be FULLY upright when consuming ice chips )   Recommendations Consider ice chips PRN;Dysphagia treatment  (following good oral care)   Diet Solid Recommendation Level 1 Dysphagia/pureed   Diet Liquid Recommendation Nectar thick liquid  (single cup sips)   Recommended Form of Meds Whole; With puree   General Precautions   (upright posture, only feed when fully alert, OOB for ALL meals, MUST be AWAKE/ALERT for meals )   Compensatory Swallowing Strategies   (slow rate of feeding, small bites/sips, effortful swallow, cough every 4-5 bites/sips, quiet environment ), alternating bites and sips,)   Results Reviewed with RN   Eating   Type of Assistance Needed Set-up / clean-up;Supervision;Verbal cues   Physical Assistance Level No physical assistance   Eating CARE Score 4   SLP Therapy Minutes   SLP Time In 7166   SLP Time Out 1300   SLP Total Time (minutes) 45   SLP Mode of treatment - Individual (minutes) 45   SLP Mode of treatment - Concurrent (minutes) 0   SLP Mode of treatment - Group (minutes) 0   SLP Mode of treatment - Co-treat (minutes) 0   SLP Mode of Treatment - Total time(minutes) 45 minutes   SLP Cumulative Minutes 800

## 2023-03-16 NOTE — PROGRESS NOTES
Internal Medicine Progress Note  Patient: Shaniqua Gracia  Age/sex: 75 y o  female  Medical Record #: [id number]      ASSESSMENT/PLAN: (Interval History)  Shaniqua Gracia is seen and examined and management for following issues:    Recent right MCA infarct W/petechial hemorrhage  • S/P thrombectomy by Dr Geovani Steven 2/12  • Continue Eliquis 5 mg twice daily/statin   • Follow-up with neurosurgery/neurology as scheduled     NICM  • EF has been as low as 10 to 15% in the past  • In 2022 was 65%  • Now down to 30% = pt declined LifeVest  • Cardiology felt this was secondary to her recent CVA as well as possible medication noncompliance  • They recommend continuing beta-blocker and adding ARB when BP allows  • Will need outpatient ischemia work-up  • Followed by Dr Codie Espinosa at [facility] however would like to follow with [facility] on dc  • Can add arb if bp improves     Seronegative rheumatoid arthritis  • A/T rheumatology takes Plaquenil 200 mg Monday through Friday 400 mg on Saturday and Sunday  • Continue same  • Follow-up on discharge     Hypercalcemia  · Seen by renal/endocrine on acute side  · Started on Sensipar and now down to 9 5  · Endocrine input appreciated  Will need outpt follow-up and potentially a surgical evaluation  Fall  · 3/6/23 onto left side  · Abrasion on left knee and Xray negative for fracture  · Could not verify whether or not she struck her head  · CT of head stable  · She also c/o L hip pain  X-ray negative for fx  · Fall precautions all in place       R great toe gout  · S/p prednisone  · Uric acid normal  · improved     Chronic pain  • Due to compression fractures of her thoracic spine/chronic rib fractures  • Lethargic 3/7 and MS contin changed to IR  • Pain management per PMR     Bipolar disorder  • Continue current medication regimen  • Follow-up with psychiatry on discharge  • Neuropsych following    Anxiety  · Cont current meds  · Moods are stable    Confusion  · Improved  · Decrease 1:1 to qhs only per PMR  · Cont to wean as tolerated  · No overnight issues    Tachycardia  · May be 2/2 anxiety/MS contin dc'd  · Monitor for now  · stable     SHAR  • Does not use CPAP at home d/t claustraphobia  • OK use oxygen overnight     Dysuria/Urinary frequency/incontinence  • Urine culture +CRE  • s/p vantin 200mg bid through 3/9/23  • Symptoms improved    Hypokalemia  · Improved after replacement  · Repleted kcl 40 meq daily x 2  · K 3 8  · BMP Thursday         DC planning: 3/22/23=likely SNF       The above assessment and plan was reviewed and updated as determined by my evaluation of the patient on 3/16/2023      Labs:   Results from last 7 days   Lab Units 03/13/23  0612   WBC Thousand/uL 7 25   HEMOGLOBIN g/dL 12 2   HEMATOCRIT % 38 8   PLATELETS Thousands/uL 172     Results from last 7 days   Lab Units 03/16/23  0705 03/13/23  0612   SODIUM mmol/L 141 138   POTASSIUM mmol/L 3 8 3 8   CHLORIDE mmol/L 112* 113*   CO2 mmol/L 28 22   BUN mg/dL 16 13   CREATININE mg/dL 0 91 0 87   CALCIUM mg/dL 9 1 9 5                   Review of Scheduled Meds:  Current Facility-Administered Medications   Medication Dose Route Frequency Provider Last Rate   • acetaminophen  650 mg Oral Q6H PRN Clara Bidding, DO     • albuterol  2 puff Inhalation Q6H PRN Clara Bidding, DO     • aluminum-magnesium hydroxide-simethicone  30 mL Oral Q4H PRN Clara Bidding, DO     • apixaban  5 mg Oral BID Clara Bidding, DO     • atorvastatin  40 mg Oral QPM Clara Bidding, DO     • bisacodyl  10 mg Rectal Once Jaciel Jaffe MD     • buPROPion  100 mg Oral BID Clara Bidding, DO     • cinacalcet  30 mg Oral Daily With Breakfast Anika Mendoza MD     • DULoxetine  60 mg Oral BID Clara Bidding, DO     • gabapentin  100 mg Oral BID Jaciel Jaffe MD     • glycerin-hypromellose-  1 drop Both Eyes Q4H PRN Clara Bidding, DO     • hydroxychloroquine  200 mg Oral Once per day on Mon Tue Wed Thu Fri Lety Bijou, DO     • hydroxychloroquine  400 mg Oral Once per day on Sun Sat Lety Bijou, DO     • lamoTRIgine  150 mg Oral HS Lety Bijou, DO     • lidocaine  1 patch Topical Daily Roderick Doyle MD     • LORazepam  0 25 mg Oral BID PRN Diamond Closs, MD     • melatonin  3 mg Oral HS MATTI MejiaNP     • menthol-methyl salicylate   Apply externally 4x Daily PRN Roderick Doyle MD     • metoprolol succinate  25 mg Oral BID LAURA Mcintyre     • morphine  7 5 mg Oral Q6H PRN Diamond Closs, MD     • ondansetron  4 mg Oral Q6H PRN Lety Bijou, DO     • pantoprazole  40 mg Oral Early Morning Quinten Salguero MD     • polyethylene glycol  17 g Oral Daily PRN Lety Bijou, DO     • prazosin  1 mg Oral HS Lety Bijou, DO     • senna-docusate sodium  1 tablet Oral BID Lety Bijou, DO     • sodium chloride  1 spray Each Nare Q1H PRN Abhay Izquierdo MD         Subjective/ HPI: Patient seen and examined  Patients overnight issues or events were reviewed with nursing or staff during rounds or morning huddle session  New or overnight issues include the following:     Pt seen in therapy  Pt c/o back bone being sore  Will have nursing add pad to bony prominence      ROS:   A 10 point ROS was performed; negative except as noted above  Imaging:     FL barium swallow video w speech   Final Result by SYSTEMGENERATED, DOCUMENTATION (03/09 1306)      FL barium swallow video w speech   Final Result by  (03/09 1550)      XR hip/pelv 2-3 vws left if performed   Final Result by Toni Chan MD (03/08 1633)      No acute osseous abnormality  Workstation performed: [id number]         XR shoulder 2+ vw left   Final Result by Toni Chan MD (03/08 9175)      No acute osseous abnormality        Workstation performed: [id number]         XR spine cervical 2 or 3 vw injury   Final Result by Toni Chan MD (03/08 8864)      No acute osseous abnormality or significant change from priors  Salt-and-pepper appearance of the skull is partially accentuated by the image processing /sharpening algorithm, though prior head CT suggests this is a true finding  Correlate for clinical/laboratory features of hyperparathyroidism  Workstation performed: [id number]         XR chest portable   Final Result by Sophia Merino MD (03/07 1650)      No acute cardiopulmonary disease  Workstation performed: [id number]         CT head wo contrast   Final Result by Minor Aquino MD (03/07 1938)      Evolving right MCA territory infarcts with associated petechial hemorrhage which is best seen on the recent MRI examination  There is no discrete parenchymal hematoma  Workstation performed: [id number]         XR knee 1 or 2 vw left   Final Result by Sophia Merino MD (03/07 2607)      No acute osseous abnormality              Workstation performed: [id number]             *Labs /Radiology studies reviewed  *Medications reviewed and reconciled as needed  *Please refer to order section for additional ordered labs studies  *Case discussed with primary attending during morning huddle case rounds    Physical Examination:  Vitals:   Vitals:    03/15/23 1426 03/15/23 1805 03/15/23 1955 03/16/23 0657   BP: 92/58 109/63 118/71 96/52   BP Location: Right arm  Right arm Right arm   Pulse: 102 101 97 89   Resp: 16  20 18   Temp: 97 8 °F (36 6 °C)  97 6 °F (36 4 °C) 97 6 °F (36 4 °C)   TempSrc: Axillary  Oral Oral   SpO2: 96%  98% 98%   Weight:       Height:           GEN: No apparent distress, pleasant  NEURO: Alert and oriented x3; left neglect, cognitive issues as well as recall issues, mild dysarthria  HEENT: Pupils are equal and reactive, EOMI, mucous membranes are moist, face asymmetrical  CV: S1 S2 regular, no MRG, no peripheral edema noted  RESP: Lungs are decreased, no wheezes, rales or rhonchi noted, on room air, respirations easy and non labored  GI: Flat, soft non tender, non distended; +BS x4  : Voiding without difficulty, incontinent at times  MUSC: Moves all extremities; left hemiparesis  SKIN: pink, dry skin, poor turgor, b/l LE with dressings in place for both prevention/current wounds         The above physical exam was reviewed and updated as determined by my evaluation of the patient on 3/16/2023  Invasive Devices     None                    VTE Pharmacologic Prophylaxis: Eliquis  Code Status: Level 1 - Full Code  Current Length of Stay: 16 day(s)      Total time spent:  30 minutes  with more than 50% spent counseling/coordinating care  Counseling includes discussion with patient re: progress  and discussion with patient of his/her current medical state/information  Coordination of patient's care was performed in conjunction with primary service  Time invested included review of patient's labs, vitals, and management of their comorbidities with continued monitoring  In addition, this patient was discussed with medical team including physician and advanced extenders  The care of the patient was extensively discussed and appropriate treatment plan was formulated unique for this patient  Medical decision making for the day was made by supervising physician unless otherwise noted in their attestation statement  ** Please Note:  voice to text software may have been used in the creation of this document   Although proof errors in transcription or interpretation are a potential of such software**

## 2023-03-17 RX ADMIN — PANTOPRAZOLE SODIUM 40 MG: 40 TABLET, DELAYED RELEASE ORAL at 06:18

## 2023-03-17 RX ADMIN — BUPROPION HYDROCHLORIDE TABLETS 100 MG: 100 TABLET, FILM COATED ORAL at 08:49

## 2023-03-17 RX ADMIN — METOPROLOL SUCCINATE 25 MG: 25 TABLET, EXTENDED RELEASE ORAL at 19:15

## 2023-03-17 RX ADMIN — CINACALCET 30 MG: 30 TABLET, FILM COATED ORAL at 08:48

## 2023-03-17 RX ADMIN — ACETAMINOPHEN 650 MG: 325 TABLET ORAL at 19:13

## 2023-03-17 RX ADMIN — METOPROLOL SUCCINATE 25 MG: 25 TABLET, EXTENDED RELEASE ORAL at 09:48

## 2023-03-17 RX ADMIN — HYDROXYCHLOROQUINE SULFATE 200 MG: 200 TABLET ORAL at 08:47

## 2023-03-17 RX ADMIN — GABAPENTIN 100 MG: 100 CAPSULE ORAL at 08:47

## 2023-03-17 RX ADMIN — ONDANSETRON 4 MG: 4 TABLET, ORALLY DISINTEGRATING ORAL at 14:46

## 2023-03-17 RX ADMIN — GABAPENTIN 100 MG: 100 CAPSULE ORAL at 19:14

## 2023-03-17 RX ADMIN — APIXABAN 5 MG: 5 TABLET, FILM COATED ORAL at 08:47

## 2023-03-17 RX ADMIN — MELATONIN 3 MG: at 21:40

## 2023-03-17 RX ADMIN — GLYCERIN 1 DROP: .002; .002; .01 SOLUTION/ DROPS OPHTHALMIC at 19:12

## 2023-03-17 RX ADMIN — BUPROPION HYDROCHLORIDE TABLETS 100 MG: 100 TABLET, FILM COATED ORAL at 19:16

## 2023-03-17 RX ADMIN — DULOXETINE HYDROCHLORIDE 60 MG: 60 CAPSULE, DELAYED RELEASE ORAL at 21:41

## 2023-03-17 RX ADMIN — MORPHINE SULFATE 7.5 MG: 15 TABLET ORAL at 08:49

## 2023-03-17 RX ADMIN — LAMOTRIGINE 150 MG: 100 TABLET ORAL at 21:40

## 2023-03-17 RX ADMIN — APIXABAN 5 MG: 5 TABLET, FILM COATED ORAL at 19:15

## 2023-03-17 RX ADMIN — DULOXETINE HYDROCHLORIDE 60 MG: 60 CAPSULE, DELAYED RELEASE ORAL at 08:43

## 2023-03-17 RX ADMIN — LIDOCAINE 5% 1 PATCH: 700 PATCH TOPICAL at 08:50

## 2023-03-17 RX ADMIN — ATORVASTATIN CALCIUM 40 MG: 40 TABLET, FILM COATED ORAL at 19:14

## 2023-03-17 NOTE — PROGRESS NOTES
03/17/23 1000   Pain Assessment   Pain Assessment Tool 0-10   Pain Score 5   Pain Location/Orientation Orientation: Right;Location: Groin   Restrictions/Precautions   Precautions Aspiration;Bed/chair alarms;Cognitive; Fall Risk;Pain;Supervision on toilet/commode;Visual deficit   Subjective   Subjective pt agreeable to perform skilled PT   Sit to Stand   Type of Assistance Needed Incidental touching; Adaptive equipment   Sit to Stand CARE Score 4   Bed-Chair Transfer   Type of Assistance Needed Incidental touching   Comment w RW   Chair/Bed-to-Chair Transfer CARE Score 4   Transfer Bed/Chair/Wheelchair   Adaptive Equipment Roller Walker   Walk 10 Feet   Type of Assistance Needed Incidental touching   Comment RW   Walk 10 Feet CARE Score 4   Ambulation   Primary Mode of Locomotion Prior to Admission Walk   Distance Walked (feet) 10 ft   Assist Device Roller Walker   Does the patient walk? 2  Yes   Therapeutic Interventions   Strengthening LAQ AP marching x20 reps abd/ ball and t-band yelloe adduction   Flexibility HS and calfs manual stretch seated   Balance dynamic balance standing EO/EC and head turns   Assessment   Treatment Assessment pt engaged in skilled PT focus on inc functional mobility and thex ex's with dynamic balance in standing ,overall Fei to ModA   Cont POC , pt back to her recliner with all need in reach and alarm on   Barriers to Discharge Inaccessible home environment;Decreased caregiver support   Plan   Progress Progressing toward goals   PT Therapy Minutes   PT Time In 1000   PT Time Out 1030   PT Total Time (minutes) 30   PT Mode of treatment - Individual (minutes) 30   PT Mode of treatment - Concurrent (minutes) 0   PT Mode of treatment - Group (minutes) 0   PT Mode of treatment - Co-treat (minutes) 0   PT Mode of Treatment - Total time(minutes) 30 minutes   PT Cumulative Minutes 1158   Therapy Time missed   Time missed?  No

## 2023-03-17 NOTE — PROGRESS NOTES
"   03/17/23 0900   Pain Assessment   Pain Assessment Tool 0-10   Pain Score 6   Pain Location/Orientation Orientation: Right;Location: Groin   Restrictions/Precautions   Precautions Aspiration;Bed/chair alarms;Cognitive; Fall Risk;Pain;Supervision on toilet/commode;Visual deficit   Cognition   Overall Cognitive Status Impaired   Subjective   Subjective \"i have pain where they did hte thrombectomy\"   Roll Left and Right   Type of Assistance Needed Supervision   Roll Left and Right CARE Score 4   Lying to Sitting on Side of Bed   Type of Assistance Needed Supervision   Comment use of bed rail   Lying to Sitting on Side of Bed CARE Score 4   Sit to Stand   Type of Assistance Needed Incidental touching;Verbal cues   Comment cues for hand placement   Sit to Stand CARE Score 4   Walk 10 Feet   Type of Assistance Needed Incidental touching;Verbal cues; Adaptive equipment   Comment with RW   Walk 10 Feet CARE Score 4   Walk 50 Feet with Two Turns   Type of Assistance Needed Incidental touching; Adaptive equipment   Comment with RW   Walk 50 Feet with Two Turns CARE Score 4   Ambulation   Distance Walked (feet) 50 ft  (x4)   Assist Device Roller Walker   Gait Pattern Inconsistant Amy;Decreased foot clearance; Forward Flexion; Improper weight shift   Limitations Noted In Balance; Safety; Heel Strike;Device Management   Provided Assistance with: Balance   Findings worked on obstacle negotiation; cueing for [employer] and scanning to avoid obstacles on the L, does not scan to LLQ, needing cues to safely avoid objects   Toilet Transfer   Type of Assistance Needed Incidental touching; Adaptive equipment   Comment with grab bar; SPT   Toilet Transfer CARE Score 4   Assessment   Treatment Assessment session focused on gait training to improve device management and safety  pt with less hands on A this session but still needs verbal cues for safety for obstacle negotiation  pt's pain better with movement    cont to work on safety " awareness with functional mobility and tasks to imrpove safety and decrease burden of care at time of d/c  Problem List Decreased strength;Decreased endurance; Impaired balance;Decreased mobility; Decreased coordination;Decreased cognition; Impaired judgement;Decreased safety awareness;Pain   Barriers to Discharge Inaccessible home environment;Decreased caregiver support   PT Barriers   Functional Limitation Ramp negotiation;Stair negotiation;Car transfers;Standing;Transfers; Walking   Plan   Progress Progressing toward goals   Recommendation   PT Discharge Recommendation Post acute rehabilitation services   Equipment Recommended Walker   PT Therapy Minutes   PT Time In 0900   PT Time Out 1000   PT Total Time (minutes) 60   PT Mode of treatment - Individual (minutes) 60   PT Mode of treatment - Concurrent (minutes) 0   PT Mode of treatment - Group (minutes) 0   PT Mode of treatment - Co-treat (minutes) 0   PT Mode of Treatment - Total time(minutes) 60 minutes   PT Cumulative Minutes 1128   Therapy Time missed   Time missed?  No

## 2023-03-17 NOTE — PLAN OF CARE
Problem: INFECTION - ADULT  Goal: Absence or prevention of progression during hospitalization  Description: INTERVENTIONS:  - Assess and monitor for signs and symptoms of infection  - Monitor lab/diagnostic results  - Monitor all insertion sites, i e  indwelling lines, tubes, and drains  - Monitor endotracheal if appropriate and nasal secretions for changes in amount and color  - [place] appropriate cooling/warming therapies per order  - Administer medications as ordered  - Instruct and encourage patient and family to use good hand hygiene technique  - Identify and instruct in appropriate isolation precautions for identified infection/condition  Outcome: Progressing

## 2023-03-17 NOTE — PLAN OF CARE
Reviewed    Problem: PAIN - ADULT  Goal: Verbalizes/displays adequate comfort level or baseline comfort level  Description: Interventions:  - Encourage patient to monitor pain and request assistance  - Assess pain using appropriate pain scale  - Administer analgesics based on type and severity of pain and evaluate response  - Implement non-pharmacological measures as appropriate and evaluate response  - Consider cultural and social influences on pain and pain management  - Notify physician/advanced practitioner if interventions unsuccessful or patient reports new pain  3/16/2023 2329 by Holley Lopez RN  Outcome: Progressing  3/16/2023 1252 by Holley Lopez RN  Outcome: Progressing     Problem: INFECTION - ADULT  Goal: Absence or prevention of progression during hospitalization  Description: INTERVENTIONS:  - Assess and monitor for signs and symptoms of infection  - Monitor lab/diagnostic results  - Monitor all insertion sites, i e  indwelling lines, tubes, and drains  - Monitor endotracheal if appropriate and nasal secretions for changes in amount and color  - [place] appropriate cooling/warming therapies per order  - Administer medications as ordered  - Instruct and encourage patient and family to use good hand hygiene technique  - Identify and instruct in appropriate isolation precautions for identified infection/condition  3/16/2023 2329 by Holley Lopez RN  Outcome: Progressing  3/16/2023 1252 by Holley Lopez RN  Outcome: Progressing  Goal: Absence of fever/infection during neutropenic period  Description: INTERVENTIONS:  - Monitor WBC    3/16/2023 2329 by Holley Lopez RN  Outcome: Progressing  3/16/2023 1252 by Holley Lopez RN  Outcome: Progressing     Problem: SAFETY ADULT  Goal: Patient will remain free of falls  Description: INTERVENTIONS:  - Educate patient/family on patient safety including physical limitations  - Instruct patient to call for assistance with activity   - Consult OT/PT to assist with strengthening/mobility   - Keep Call bell within reach  - Keep bed low and locked with side rails adjusted as appropriate  - Keep care items and personal belongings within reach  - Initiate and maintain comfort rounds  - Make Fall Risk Sign visible to staff  - Offer Toileting every 4 Hours, in advance of need  - Initiate/Maintain bed and chair alarm  - Apply yellow socks and bracelet for high fall risk patients  - Consider moving patient to room near nurses station  3/16/2023 2329 by Yin Ch RN  Outcome: Progressing  3/16/2023 1252 by Yin Ch RN  Outcome: Progressing  Goal: Maintain or return to baseline ADL function  Description: INTERVENTIONS:  -  Assess patient's ability to carry out ADLs; assess patient's baseline for ADL function and identify physical deficits which impact ability to perform ADLs (bathing, care of mouth/teeth, toileting, grooming, dressing, etc )  - Assess/evaluate cause of self-care deficits   - Assess range of motion  - Assess patient's mobility; develop plan if impaired  - Assess patient's need for assistive devices and provide as appropriate  - Encourage maximum independence but intervene and supervise when necessary  - Involve family in performance of ADLs  - Assess for home care needs following discharge   - Consider OT consult to assist with ADL evaluation and planning for discharge  - Provide patient education as appropriate  3/16/2023 2329 by Yin Ch RN  Outcome: Progressing  3/16/2023 1252 by Yin Ch RN  Outcome: Progressing  Goal: Maintains/Returns to pre admission functional level  Description: INTERVENTIONS:  - Set and communicate daily mobility goal to care team and patient/family/caregiver     - Collaborate with rehabilitation services on mobility goals if consulted  - Out of bed for toileting  - Record patient progress and toleration of activity level   3/16/2023 2329 by Yin Ch RN  Outcome: Progressing  3/16/2023 1252 by Yin Ch RN  Outcome: Progressing     Problem: DISCHARGE PLANNING  Goal: Discharge to home or other facility with appropriate resources  Description: INTERVENTIONS:  - Identify barriers to discharge w/patient and caregiver  - Arrange for needed discharge resources and transportation as appropriate  - Identify discharge learning needs (meds, wound care, etc )  - Arrange for interpretive services to assist at discharge as needed  - Refer to Case Management Department for coordinating discharge planning if the patient needs post-hospital services based on physician/advanced practitioner order or complex needs related to functional status, cognitive ability, or social support system  3/16/2023 2329 by Citlalli Collier RN  Outcome: Progressing  3/16/2023 1252 by Citlalli Collier RN  Outcome: Progressing     Problem: Nutrition/Hydration-ADULT  Goal: Nutrient/Hydration intake appropriate for improving, restoring or maintaining nutritional needs  Description: Monitor and assess patient's nutrition/hydration status for malnutrition  Collaborate with interdisciplinary team and initiate plan and interventions as ordered  Monitor patient's weight and dietary intake as ordered or per policy  Utilize nutrition screening tool and intervene as necessary  Determine patient's food preferences and provide high-protein, high-caloric foods as appropriate       INTERVENTIONS:  - Monitor oral intake, urinary output, labs, and treatment plans  - Assess nutrition and hydration status and recommend course of action  - Evaluate amount of meals eaten  - Assist patient with eating if necessary   - Allow adequate time for meals  - Recommend/ encourage appropriate diets, oral nutritional supplements, and vitamin/mineral supplements  - Order, calculate, and assess calorie counts as needed  - Recommend, monitor, and adjust tube feedings and TPN/PPN based on assessed needs  - Assess need for intravenous fluids  - Provide specific nutrition/hydration education as appropriate  - Include patient/family/caregiver in decisions related to nutrition  3/16/2023 2329 by Dayana Gomez RN  Outcome: Progressing  3/16/2023 1252 by Dayana Gomez RN  Outcome: Progressing     Problem: Prexisting or High Potential for Compromised Skin Integrity  Goal: Skin integrity is maintained or improved  Description: INTERVENTIONS:  - Identify patients at risk for skin breakdown  - Assess and monitor skin integrity  - Assess and monitor nutrition and hydration status  - Monitor labs   - Assess for incontinence   - Turn and reposition patient  - Assist with mobility/ambulation  - Relieve pressure over bony prominences  - Avoid friction and shearing  - Provide appropriate hygiene as needed including keeping skin clean and dry  - Evaluate need for skin moisturizer/barrier cream  - Collaborate with interdisciplinary team   - Patient/family teaching  - Consider wound care consult   3/16/2023 2329 by Dayana Gomez RN  Outcome: Progressing  3/16/2023 1252 by Dayana Gomez RN  Outcome: Progressing     Problem: MOBILITY - ADULT  Goal: Maintain or return to baseline ADL function  Description: INTERVENTIONS:  -  Assess patient's ability to carry out ADLs; assess patient's baseline for ADL function and identify physical deficits which impact ability to perform ADLs (bathing, care of mouth/teeth, toileting, grooming, dressing, etc )  - Assess/evaluate cause of self-care deficits   - Assess range of motion  - Assess patient's mobility; develop plan if impaired  - Assess patient's need for assistive devices and provide as appropriate  - Encourage maximum independence but intervene and supervise when necessary  - Involve family in performance of ADLs  - Assess for home care needs following discharge   - Consider OT consult to assist with ADL evaluation and planning for discharge  - Provide patient education as appropriate  3/16/2023 2329 by Dayana Gomez RN  Outcome: Progressing  3/16/2023 1252 by Dayana Gomez RN  Outcome: Progressing  Goal: Maintains/Returns to pre admission functional level  Description: INTERVENTIONS:  - Set and communicate daily mobility goal to care team and patient/family/caregiver     - Collaborate with rehabilitation services on mobility goals if consulted  - Out of bed for toileting  - Record patient progress and toleration of activity level   3/16/2023 5274 by Isabell Barrett, RN  Outcome: Progressing  3/16/2023 1252 by Isabell Barrett, RN  Outcome: Progressing

## 2023-03-17 NOTE — PROGRESS NOTES
"   03/17/23 1200   Pain Assessment   Pain Score No Pain   Restrictions/Precautions   Precautions Aspiration;Bed/chair alarms;Cognitive; Fall Risk;Pain;Supervision on toilet/commode;Visual deficit   Weight Bearing Restrictions No   ROM Restrictions No   Lifestyle   Autonomy \"I feel really good after my shower, now I can relax\"   Eating   Type of Assistance Needed Set-up / clean-up   Physical Assistance Level No physical assistance   Comment Pt seated in recliner chair eating lunch at beginning of session with PCA present  Eating CARE Score 5   Oral Hygiene   Type of Assistance Needed Supervision   Physical Assistance Level No physical assistance   Comment Pt complete oral hygiene following meal with use of suction seated in WC  Oral Hygiene CARE Score 4   Grooming   Findings Pt able to apply deoderant and face cream post shower  Assist for opening containers  Shower/Bathe Self   Type of Assistance Needed Physical assistance   Physical Assistance Level 26%-50%   Comment Pt complete shower while seated on TTB  Pt able to wash 8/10 parts while seated with verbal cues for thorough cleaning of L side  Pt implement xleg technique for washing of LE  Assist for washing buttocks/santiago in stance due to dec balance and difficulty producing thorough clean  Mod A provided for balance while in stance, verbal cues for use of grab bars  pt noted to attempt taking steps while in stance, redirection required  Christian Neth removed for shower and placed back on after drying  Shower/Bathe Self CARE Score 3   Upper Body Dressing   Type of Assistance Needed Physical assistance   Physical Assistance Level 26%-50%   Comment Pt requiring mod assist for doffing shirt overhead due to dec UE ROM/strength, Pt then able to take out arms with inc time  When doffing shirt, Pt req assistance for correct orientation of shirt and locating of arm holes  verbal cue provided for threading LUE into shirt first then RUE   Pt require mod assist for threading " LUE due to being caught bracelets and CM over tunk  Upper Body Dressing CARE Score 3   Lower Body Dressing   Type of Assistance Needed Physical assistance   Physical Assistance Level 26%-50%   Comment Pt complete threading of BLE into underwear and pants while seated on TTB with xleg technique  Pt require assistance to insert pad into udnerwear, attempting to stick prior to peeling off paper  Verbal cues provided for threading of LLE first, then RLE  Pt then stand with use of grab bar and attempt CM over hips  Pt require mod A in stance for balance and assist with CM over hips due to difficulty in complete pull up  Lower Body Dressing CARE Score 3   Putting On/Taking Off Footwear   Type of Assistance Needed Physical assistance   Physical Assistance Level 51%-75%   Comment Pt able to doff slipper socks using xleg technique  Pt then able to don sock long term onto RLE and unable to fully pull up despite verbal cueing  Pt then attempt to don socks on LLE with xleg technique, but unable to maintain position and ultimately require assist to don  Pt may benefit from trial of LH AE for donning footwear  Putting On/Taking Off Footwear CARE Score 2   Sit to Stand   Type of Assistance Needed Incidental touching   Physical Assistance Level No physical assistance   Comment CGA for safety, use of RW   Sit to Stand CARE Score 4   Bed-Chair Transfer   Type of Assistance Needed Incidental touching   Physical Assistance Level No physical assistance   Comment CGA for safety with RW   Chair/Bed-to-Chair Transfer CARE Score 4   Toileting Hygiene   Type of Assistance Needed Physical assistance   Physical Assistance Level 26%-50%   Comment Pt complete hygiene while seated on toilet  Mod A provided in stance for balance  Pt attempt CM over hips with single hand release from grab bar, ultimately requiring Mod A for complete pull up in back     Toileting Hygiene CARE Score 3   Toilet Transfer   Type of Assistance Needed Incidental touching   Physical Assistance Level No physical assistance   Comment SPT with use of grab bar  Verbal cues for use of grab bars to assist in boost from toilet   Toilet Transfer CARE Score 4   Activity Tolerance   Activity Tolerance Patient tolerated treatment well   Assessment   Treatment Assessment Pt participated in 90 minute OT session focused on ADL retraining and functional transfers  Pt tolerated treatment well, however, often required verbal cueing for activity sequencing and attention to L side  At times Pt demo poor safety (i e atempting to move while in shower)  Pt may benefit from implementation of AE for inc independence in ADL tasks  Pt would benefit from continued OT treatment focused on ADL/iADL retraining, functional transfers, safety awareness, coordination, strength, AE/DME training, and compensatory strategies  Pt left in recliner chair with alarm engaged and all needs met  Prognosis Fair   Problem List Decreased range of motion;Decreased strength;Decreased endurance; Impaired balance;Decreased coordination;Decreased cognition; Impaired judgement;Decreased safety awareness   Plan   Treatment/Interventions ADL retraining;Functional transfer training; Therapeutic exercise; Endurance training;Patient/family training;Cognitive reorientation;Equipment eval/education; Compensatory technique education   Progress Progressing toward goals   Recommendation   OT Discharge Recommendation Post acute rehabilitation services   OT Therapy Minutes   OT Time In 1200   OT Time Out 1330   OT Total Time (minutes) 90   OT Mode of treatment - Individual (minutes) 90   OT Mode of treatment - Concurrent (minutes) 0   OT Mode of treatment - Group (minutes) 0   OT Mode of treatment - Co-treat (minutes) 0   OT Mode of Treatment - Total time(minutes) 90 minutes   OT Cumulative Minutes 1685   Therapy Time missed   Time missed?  No

## 2023-03-17 NOTE — PROGRESS NOTES
Internal Medicine Progress Note  Patient: Camilla Villa  Age/sex: 75 y o  female  Medical Record #: [id number]      ASSESSMENT/PLAN: (Interval History)  Camilla Villa is seen and examined and management for following issues:    Recent right MCA infarct W/petechial hemorrhage  • S/P thrombectomy by Dr Summer Zabala 2/12  • Continue Eliquis 5 mg twice daily/statin   • Follow-up with neurosurgery/neurology as scheduled     NICM  • EF has been as low as 10 to 15% in the past  • In 2022 was 65%  • Now down to 30% = pt declined LifeVest  • Cardiology felt this was secondary to her recent CVA as well as possible medication noncompliance  • They recommend continuing beta-blocker and adding ARB when BP allows  • Will need outpatient ischemia work-up  • Followed by Dr Aldair Vanegas at [facility] however would like to follow with [facility] on dc  • Can add arb if bp improves     Seronegative rheumatoid arthritis  • A/T rheumatology takes Plaquenil 200 mg Monday through Friday 400 mg on Saturday and Sunday  • Continue same  • Follow-up on discharge     Hypercalcemia  · Seen by renal/endocrine on acute side  · Started on Sensipar and now down to 9 5  · Endocrine input appreciated  Will need outpt follow-up and potentially a surgical evaluation  Fall  · 3/6/23 onto left side  · Abrasion on left knee and Xray negative for fracture  · Could not verify whether or not she struck her head  · CT of head stable  · She also c/o L hip pain  X-ray negative for fx  · Fall precautions all in place       R great toe gout  · S/p prednisone  · Uric acid normal  · improved     Chronic pain  • Due to compression fractures of her thoracic spine/chronic rib fractures  • Lethargic 3/7 and MS contin changed to IR  • Generally takes about 1-2 doses daily  • Pain management per PMR     Bipolar disorder  • Continue current medication regimen  • Follow-up with psychiatry on discharge  • Neuropsych following    Anxiety  · Cont current meds  · Moods are stable    Confusion  · Improved  · Decrease 1:1 to qhs only per PMR  · Cont to wean as tolerated  · No overnight issues may be able to dc 1:1    Tachycardia  · resolved     SHAR  • Does not use CPAP at home d/t claustraphobia  • OK use oxygen overnight     Dysuria/Urinary frequency/incontinence  • Urine culture +CRE  • s/p vantin 200mg bid through 3/9/23  • resolved    Hypokalemia  · Improved after replacement  · Repleted kcl 40 meq daily x 2  · K 3 8  · BMP stable  · Labs monday        DC planning: 3/22/23=likely SNF       The above assessment and plan was reviewed and updated as determined by my evaluation of the patient on 3/17/2023      Labs:   Results from last 7 days   Lab Units 03/13/23  0612   WBC Thousand/uL 7 25   HEMOGLOBIN g/dL 12 2   HEMATOCRIT % 38 8   PLATELETS Thousands/uL 172     Results from last 7 days   Lab Units 03/16/23  0705 03/13/23  0612   SODIUM mmol/L 141 138   POTASSIUM mmol/L 3 8 3 8   CHLORIDE mmol/L 112* 113*   CO2 mmol/L 28 22   BUN mg/dL 16 13   CREATININE mg/dL 0 91 0 87   CALCIUM mg/dL 9 1 9 5                   Review of Scheduled Meds:  Current Facility-Administered Medications   Medication Dose Route Frequency Provider Last Rate   • acetaminophen  650 mg Oral Q6H PRN Patti Fall, DO     • albuterol  2 puff Inhalation Q6H PRN Patti Fall, DO     • aluminum-magnesium hydroxide-simethicone  30 mL Oral Q4H PRN Patti Fall, DO     • apixaban  5 mg Oral BID Patti Fall DO     • atorvastatin  40 mg Oral QPM Patti Fall, DO     • bisacodyl  10 mg Rectal Once Rishabh Dean MD     • buPROPion  100 mg Oral BID Patti Fall DO     • cinacalcet  30 mg Oral Daily With Breakfast Anika Castle MD     • DULoxetine  60 mg Oral BID Patti Fall DO     • gabapentin  100 mg Oral BID Rishabh Dean MD     • glycerin-hypromellose-  1 drop Both Eyes Q4H PRN Patti Fall DO     • hydroxychloroquine  200 mg Oral Once per day on Mon Tue Wed Thu Fri Kobi Samson, DO     • hydroxychloroquine  400 mg Oral Once per day on Sun Sat Kobi Samson DO     • lamoTRIgine  150 mg Oral HS Kobi Samson DO     • lidocaine  1 patch Topical Daily Yessy Chatman MD     • LORazepam  0 25 mg Oral BID PRN Misha Godoy MD     • melatonin  3 mg Oral HS LAURA Ennis     • menthol-methyl salicylate   Apply externally 4x Daily PRN Yessy Chatman MD     • metoprolol succinate  25 mg Oral BID BillLAURA Ramsey     • morphine  7 5 mg Oral Q6H PRN Misha Godoy MD     • ondansetron  4 mg Oral Q6H PRN Kobi Samson DO     • pantoprazole  40 mg Oral Early Morning Ashley Babcock MD     • polyethylene glycol  17 g Oral Daily PRN Kobi Samson DO     • prazosin  1 mg Oral HS Kobi Samson DO     • senna-docusate sodium  1 tablet Oral BID Kobi Samson DO     • sodium chloride  1 spray Each Nare Q1H PRN Heidi Feldman MD         Subjective/ HPI: Patient seen and examined  Patients overnight issues or events were reviewed with nursing or staff during rounds or morning huddle session  New or overnight issues include the following:     Pt seen in her room  She remembers calling for the nurse over night  Her overall mentation continues to improve  She c/o R groin muscle strain this am       ROS:   A 10 point ROS was performed; negative except as noted above  Imaging:     FL barium swallow video w speech   Final Result by SYSTEMGENERATED, DOCUMENTATION (03/09 1306)      FL barium swallow video w speech   Final Result by  (03/09 1550)      XR hip/pelv 2-3 vws left if performed   Final Result by Olinda Bejarano MD (03/08 5423)      No acute osseous abnormality  Workstation performed: [id number]         XR shoulder 2+ vw left   Final Result by Olinda Bejarano MD (03/08 2455)      No acute osseous abnormality        Workstation performed: [id number]         XR spine cervical 2 or 3 vw injury   Final Result by Olinda Bejarano MD (03/08 1643)      No acute osseous abnormality or significant change from priors  Salt-and-pepper appearance of the skull is partially accentuated by the image processing /sharpening algorithm, though prior head CT suggests this is a true finding  Correlate for clinical/laboratory features of hyperparathyroidism  Workstation performed: [id number]         XR chest portable   Final Result by Jill Smith MD (03/07 1650)      No acute cardiopulmonary disease  Workstation performed: [id number]         CT head wo contrast   Final Result by Alana Ervin MD (03/07 1358)      Evolving right MCA territory infarcts with associated petechial hemorrhage which is best seen on the recent MRI examination  There is no discrete parenchymal hematoma  Workstation performed: [id number]         XR knee 1 or 2 vw left   Final Result by Jill Smith MD (03/07 1537)      No acute osseous abnormality              Workstation performed: [id number]             *Labs /Radiology studies reviewed  *Medications reviewed and reconciled as needed  *Please refer to order section for additional ordered labs studies  *Case discussed with primary attending during morning huddle case rounds    Physical Examination:  Vitals:   Vitals:    03/16/23 1335 03/16/23 1700 03/16/23 2133 03/17/23 0543   BP: 108/60 113/63 107/60 111/65   BP Location: Right arm Left arm Left arm Right arm   Pulse: 82 (!) 111 104 85   Resp: 18  18 18   Temp: 97 6 °F (36 4 °C)  97 7 °F (36 5 °C) 97 8 °F (36 6 °C)   TempSrc: Oral  Axillary Axillary   SpO2: 97%  98% 98%   Weight:       Height:           GEN: No apparent distress, interactive  NEURO: Alert and oriented x3; left neglect, cognitive issues as well as recall issues however overall improvement noted, ongoing mild dysarthria  HEENT: Pupils are equal and reactive, EOMI, mucous membranes are moist, face asymmetrical  CV: S1 S2 regular, no MRG, no peripheral edema noted  RESP: Lungs are decreased, no wheezes, rales or rhonchi noted, on room air requires oxygen with sleep, respirations easy and non labored  GI: Flat, soft non tender, non distended; +BS x4  : Voiding without difficulty, incontinent at times  MUSC: Moves all extremities; left hemiparesis  SKIN: pink, dry skin, poor turgor, b/l LE with dressings in place for both prevention/current wounds         The above physical exam was reviewed and updated as determined by my evaluation of the patient on 3/17/2023  Invasive Devices     None                    VTE Pharmacologic Prophylaxis: Eliquis  Code Status: Level 1 - Full Code  Current Length of Stay: 17 day(s)      Total time spent:  30 minutes  with more than 50% spent counseling/coordinating care  Counseling includes discussion with patient re: progress  and discussion with patient of his/her current medical state/information  Coordination of patient's care was performed in conjunction with primary service  Time invested included review of patient's labs, vitals, and management of their comorbidities with continued monitoring  In addition, this patient was discussed with medical team including physician and advanced extenders  The care of the patient was extensively discussed and appropriate treatment plan was formulated unique for this patient  Medical decision making for the day was made by supervising physician unless otherwise noted in their attestation statement  ** Please Note:  voice to text software may have been used in the creation of this document   Although proof errors in transcription or interpretation are a potential of such software**

## 2023-03-17 NOTE — PROGRESS NOTES
PM&R PROGRESS NOTE:  Dolly Crane 75 y o  female MRN: [id number]  Unit/Bed#: -01 Encounter: [id number]      Rehabilitation Diagnosis: Impairment of mobility, safety, Activities of Daily Living (ADLs), and cognitive/communication skills due to Stroke:  01 1  Left Body Involvement (Right Brain)    HPI: Dolly Crane is a 75 y o  female with history of asthma, anxiety, bipolar depression, chronic narcotic use, previous enteroviral infection of the heart, seronegative arthropathy of multiple joints, undifferentiated connective tissue disorder, obstructive sleep apnea on home CPAP who presented to the [address] [place] [address] on 2/22 for altered mental status  According to the patient's daughter on chart review, the patient's mental status had been declining for the prior 48 hours to admission  She did receive Narcan which improved her mental status slightly  Of note she had a recent stroke with revascularization on 2/12/2023 with Dr Elaina Kawasaki  She was seen by neurology with overall good strength except mildly weak in the lower extremities with brisk reflexes on the left and a positive Haroon's  At this time is felt to be potentially related to polypharmacy in the setting of chronic pain meds versus seizure in the setting of a recent stroke  She was initiated on 81 mg of aspirin daily with a plan of potentially transitioning to Eliquis, plan for EEG  CT of the head was negative  MRI was completed showing a new small acute/recent interval lacunar infarction in the right cerebral pedun there is jacinta  She was started on Eliquis  Nephrology was consulted as well for hypercalcemia  The patient was evaluated by the Rehabilitation team and deemed an appropriate candidate for comprehensive inpatient rehabilitation and admitted to the [facility] on 2/28/2023  3:41 PM patient was seen in room with brother at bedside and all questions regarding the acute inpatient rehabilitation program were answered  We also discussed at length course in the hospital and her last hospitalization to verify events and clarify any imaging or laboratory findings  Patient is eager to start her therapy program       SUBJECTIVE: Patient seen and evaluated at bedside  Complaints this morning of some right proximal thigh pain and examined by myself and the IM team  Good distal pulses , sensation, and strength in the right leg  No palpable hematoma or, erythema, or indication of an acute process  Likely MSK related  Otherwise, she did well overnight and appropriately used the call bell overnight  Will discontinue 1:1 continuous observation  Patient denies fever, chills, nausea, emesis, cough, shortness of breath, diarrhea, or constipation  Sleep was fine, mood stable  Pain is controlled  ASSESSMENT: Stable, progressing    PLAN:    Rehabilitation  • Functional deficits:  Self care and mobility  • Continue current rehabilitation plan of care to maximize function  • Functional update:   Physical Therapy Occupational Therapy Speech Therapy   Weight Bearing Status: Full Weight Bearing  Transfers: Minimal Assistance  Bed Mobility: Minimal Assistance  Amulation Distance (ft): 100 feet (rolling walker)  Ambulation: Minimal Assistance  Assistive Device for Ambulation: Roller Walker  Wheelchair Mobility Distance: 100 ft  Wheelchair Mobility: Supervision  Number of Stairs: 6  Assistive Device for Stairs: Bilateral Hand Rails  Stair Assistance: Moderate Assistance  Discharge Recommendations: Skilled Nursing Facility   Eating: Supervision  Grooming: Minimal Assistance  Bathing: Moderate Assistance  Bathing: Moderate Assistance  Upper Body Dressing: Moderate Assistance  Lower Body Dressing: Minimal Assistance  Toileting: Moderate Assistance  Toilet Transfer:  Moderate Assistance  Cognition: Exceptions to WNL  Cognition: Decreased Memory, Impulsive, Decreased Executive Functions, Decreased Attention, Decreased Comprehension, Decreased Safety  Orientation: Person, Place, Time, Situation   Mode of Communication: Verbal  Speech/Language: Dysarthia  Cognition: Exceptions to WNL  Cognition: Decreased Memory, Decreased Executive Functions, Decreased Attention, Decreased Comprehension, Decreased Safety, Impulsive  Orientation: Person, Place, Situation (fluctuates with times of increased confusion and hallucinations)  Swallowing: Exceptions to WNL  Swallowing: Oral Dysphagia, Pharyngeal Dysphagia, Esophageal Dysphagia, Aspiration Risk, Reflux Precautions  Diet Recommendations: Level 1/HeroneAndrzejar Thick  Discharge Recommendations: Home with:  [address] with[de-identified] 24 Hour Supervision, 24 Hour Assisteance, Family Support, Home Speech Therapy, Outpatient Speech Therapy       • Estimated Discharge: Transition to subacute rehabilitation    DVT prophylaxis  • On Eliquis     Pain  • Cymbalta 60 mg twice daily  • Gabapentin 100 mg twice daily  • Morphine 7 5 mg 4 times a day as needed  • Acetaminophen 650 mg every 6 hours as needed     Bladder plan  • Continent     Bowel plan  • Continent  • Last bowel movement 3/17     Code Status  • Level 1 full code      * Acute CVA (cerebrovascular accident) [facility])  Assessment & Plan  3/8 - neuro exam stable - continue to monitor closely   CT head 3/7 - Evolving right MCA territory infarcts with associated petechial hemorrhage which is best seen on the recent MRI examination  There is no discrete parenchymal hematoma  - discussed above with radiologist Della - no acute bleeding noted   MRI brain 2/22 - 1  New small acute /more recent interval lacunar infarction in the right cerebral peduncle (series 4, image 14)  2   Extensive multifocal large, previously present right middle cerebral artery infarction which is evolving with superimposed areas of evolving hemorrhage likely related to hemorrhagic transformation of subacute right MCA infarction  No significant   mass effect  3   Mild, chronic microangiopathy    S/P thrombectomy 2/12 by Dr Elaina Kawasaki   Currently on a dysphagia 1 diet with thin liquids  Secondary stroke prophylaxis at this time with Eliquis 5 mg twice daily (aspirin d/c'd earlier); and statin; optimal BP control   PT, OT, SLP  Consideration for neuropsychology if patient agreeable  Review modifiable risk factors and provide stroke education  Monitor for poststroke pain  Close neuro follow-up as patient has had multiple strokes in the last 2 months    Hypokalemia  Assessment & Plan  Hypokalemia with potassium down to 3 4 given 40 mill equivalents today on 3/10 but IV infiltrated  Able to get PO dose over weekend  Potasium now WNL  Plan to recheck BMP and CBC tomorrow    Wound of left ankle  Assessment & Plan  On lateral ankle possibly occurred at time of fall, reviewed wound getting SilvaSorb examined today on 3/15 with nursing  - Wound care c/s  1  Cleanse L lateral ankle wound with NSS, pat dry, and apply silvasorb gel to the wound bed  Cover with Allevyn foam dressing  Isacc dressing with T  Change every day and as needed for soilage/dislodgement  2  Preventative nursing skin care orders placed  3   Pressure relief, turning and repositioning with foam offloading wedges and waffle cushion to the care  Prevalon boot     Dysphagia  Assessment & Plan  - Current diet: Modified - D1, NTL after failed VBS, GI consulted, plan for esophogram, protonix added  - Recommend VBS study-VBS completed on 3/9 with evidence for esophageal dysmotility and dysphagia recommending puréed with nectar thick, okay for ice chips  - SLP evaluate and treat decline in swallowing   - Aspiration precautions; HOB completely upright when eating, 100% supervision, monitor for pocketing  - Hold oral intake if patient too confused or lethargic and notify MD   - Oral care with meals and bedtime  - Ensure adequate hydration  - Nutrition consult to assist with management   - Rehab nursing and staff to ensure safe and appropriate feeding to decrease risk of aspiration     - Rehab physician oversight dysphagia management and risks associated with it  - Monitor for appropriate fluid and nutrition intake      Gout of foot  Assessment & Plan  Much improved on prednisone  Uric acid wnl   R 1st MTPJ redness and focal tenderness without significant edema or skin breakdown   - IM feels most c/w gout and have ordered prednisone - taper thru 3/12  - Monitor for improvement or non-improvement and other etiologies as well closely     AMS (altered mental status)  Assessment & Plan  Mentation improving as of 3/13 and more consistently after reduction in opioids as well as addition of Sensipar decreasing her overall calcium levels- Continues to be stable as of 3/17  3/7 Patient with some sleepiness at times apparently last several days with poor activity tolerance and now s/p fall - appears back to recent baseline on follow-up eval   CT head 3/7 - Evolving right MCA territory infarcts with associated petechial hemorrhage which is best seen on the recent MRI examination  There is no discrete parenchymal hematoma   - discussed above with johny Hull - no acute bleeding noted   - Ensure adequate nutrition and hydration with altered diet and sub-optimal intake BUN/Cr acceptable   - Continue Eliquis, monitor vitals scout  - Comgmt with IM team   - Patient afebrile; CBC wnl; LFTs wnl; Ammonia wnl  - Continue Abx of UTI   - Calcium elevated > optimal mgmt per endo > adding Sensipar   - Hx of SHAR - continue 2L at night O2 with spot checks - refusing CPAP - obtain noct ox on 2L   - Hold standing long-acting MS Contin 15mg Q12H and transitioned to MS IR 7 5mg Q6H PRN   - Gabapentin 100mg BID weaned down recently            UTI (urinary tract infection)  Assessment & Plan  UA last week with culture growing greater than 100,000 CFU Morganella and Serratia  Started on Ciprofloxacin but transitioned to Cefpodixime per prior providers with plan to continue into 3/10   Dysuria improving History of stroke  Assessment & Plan  Patient with recent admission in early 2023 for right MCA CVA with petechial hemorrhage and residual left-sided weakness and dysarthria  Patient had mechanical thrombectomy with Dr Priscila Foreman on 2/12    Obstructive sleep apnea  Assessment & Plan  Patient refusing CPAP 2/2 claustrophobia - refuses home or hospital CPAP   2L O2 at night with spot checks   Obtain noct ox on 2L; repeat noct ox prior to d/c for possible home DME     History of bacteremia  Assessment & Plan  Unclear etiology initially of whether her altered mental status was related to stroke versus polypharmacy versus opioid overdose versus infection versus seizure  1/2 micrococcus with a second set staph coag negative and followed by infectious disease after course of antibiotic treatment  Has been off of antibiotics and repeat blood cultures negative to date, ID felt may be contaminant    Discoloration of skin of foot  Assessment & Plan  N/V intact - no significant discoloration today   Per daughter and patient patient's foot will become blue or red and change colors frequently  No evidence for infectious etiology and had a lower extremity arterial duplex with no evidence of any significant arterial occlusive disease  May be related to the seronegative spondyloarthropathies  Likely the source of of the inaccurate and inconsistent pulse oximetry readings in the hands as well    Hypercalcemia  Assessment & Plan  Nephrology had been following, nonsuppressed PTH with concerns for primary hyperparathyroidism - they defer to endo on mgmt   C spine XR 3/7 - Salt-and-pepper appearance of the skull is partially accentuated by the image processing /sharpening algorithm, though prior head CT suggests this is a true finding  Correlate for clinical/laboratory features of hyperparathyroidism; Salt-and-pepper appearance of the skull accentuated by image processing/sharpening algorithm    However, prior head CT shows a mottled "appearance of the skull as well suggesting hyperparathyroidism  No focal lytic or blastic lesions  No acute fractures  Endocrine consult   \"SPEP-no monoclonal bands, UPEP- no monoclonal bands, PTH RP less than 2, 25-hydroxy vitamin D 41 7, 1,25 hydroxy vitamin D 23 7 low, Phos 2 5 low, PTH 65 4   Assessment/Plan:  1  Hypercalcemia: Suspected to be on the basis of primary hyperparathyroidism: Corrected calcium continues to increase slightly  Sensipar 30 mg daily was started and is being tolerated  Given history of osteoporosis, if appropriate surgical candidate consider surgery referral as outpatient  Follow-up with endocrinology as outpatient  Monitor corrected calcium level  Adjust dose as needed  Status post a CT of the chest abdomen and pelvis with no overt malignancy  - Patient has allergy to contrast - discussed with endo fellow  -Calcium on 3/10 is at 10 2 and down to 9 5 on 3/13 and down to 9 1 on 3/16  Sensipar is working well      HTN (hypertension)  Assessment & Plan  Patient with some orthostasis  IM consulted and with overall management at their discretion during ARC course  Monitor orthostatics; PRN ADDY hose, abdominal binder - ensure adequate hydration   Currently on metoprolol succinate 25 mg twice daily with holding parameters   Plan per cardiology did not add an ARB in the setting of heart failure however as blood pressure allows    Bipolar depression ([facility] )  Assessment & Plan  Currently on Wellbutrin 100 mg twice daily, Lamictal 150 mg nightly, and Cymbalta 60 mg twice daily  Consistent with home regimen  Continue monitoring for stability, mood lability  Consider neuropsychology consultation if patient agreeable    Fall  Assessment & Plan  3/7 early morning - per report \"unwitnessed FALL; Per RN patient set off bed alarm, but has already fallen onto her left side  Confused and wanting to wash her face  Once more awake, mentation improved  \"  - CTH evolving infarct without new bleeding per " rads  - L knee mild pain but good ROM without significant edema or instability > improving - XR unremarkable - monitor   - Mild L hip pain but good ROM and can adequately WB > improving - L hip/pelvis XR - mild hip OA without acute findings -  monitor  - Some L shoulder pain - some mildly decreased AROM, +Muñoz > stable -  L shoulder XR - unremarkable - monitor    - Subtle neck pain but full ROM without radicular complaints or focal weakness, sensory changes > improving - C spine XR - no acute findings or change from prior - chronic changes > Chronic hyperkyphosis  Anterolisthesis at C4-5 is unchanged  No acute subluxation; Salt-and-pepper appearance of the skull accentuated by image processing/sharpening algorithm  Multilevel moderate to severe discogenic and uncovertebral degenerative changes greatest at C5-6 and C6-7  Multilevel mild facet degenerative changes greatest at C4-5  > monitor   - Optimal mgmt with wakefulness/AMS - continue 2L at night O2 with spot checks - refusing CPAP  - Has been demonstrating use of call bell and not getting up overnight without assistance  After team discussion, plan for discontinuing observation  Cervical spondylosis  Assessment & Plan  C spine XR 3/7 - Chronic hyperkyphosis  Anterolisthesis at C4-5 is unchanged  No acute subluxation; Salt-and-pepper appearance of the skull accentuated by image processing/sharpening algorithm  However, prior head CT shows a mottled appearance of the skull as well suggesting hyperparathyroidism  No focal lytic or blastic lesions  No acute fractures  Multilevel moderate to severe discogenic and uncovertebral degenerative changes greatest at C5-6 and C6-7    Multilevel mild facet degenerative changes greatest at C4-5   - No signs of myelopathy or radiculopathy - monitor     Osteoporosis  Assessment & Plan  History of an L1 compression fracture status post kyphoplasty  Was previously on Prolia has not been on since 2020  Outpatient referral to endocrine    CHF (congestive heart failure) ([facility])  Assessment & Plan  Wt Readings from Last 3 Encounters:   03/15/23 59 2 kg (130 lb 8 2 oz)   02/16/23 62 6 kg (138 lb)   02/12/23 67 9 kg (149 lb 11 1 oz)       Cardiomyopathy with reduced ejection fraction as low as 10 to 15% back in 2004 but has improved since and most recently was 25 to 30% on last admission  CXR 3/7 unremarkable; does not appear volume overloaded   Patient was offered LifeVest but declined  On metoprolol succinate 25 mg twice daily, with plan to add an ARB as her blood pressure would allow  Strict I's and O's and daily weights  Follows with Dr Anton Giordano at [facility]      Seronegative arthropathy of multiple sites [facility])  [address] with rheumatology and is on Plaquenil 200 mg Monday through Friday and 400 mg on Saturday Sunday  Continue home regimen and follow-up with rheumatology on discharge    Asthma  Assessment & Plan  3/8 saturating well on RA/2L   3/7 afternoon - patient had questionable desat with therapy but had full improved mentation and was in no resp or other distress at that time  - Comgmt with IM who also evaluated patient at this time    - She historically has had difficulty obtaining O2 from finger tips at times - when pulse ox moved to toe she had good oxygenation on RA-2L  - CXR no acute findings - bibasilar scarring   - Continue use of albuterol inhaler 2 puffs every 6 hours as needed    Chronic pain syndrome  Assessment & Plan  History of chronic pain secondary to compression fractures in her thoracic spine and chronic rib fractures   Patient with over sleepiness at times at sedating meds decreased some recently   · Holding standing long-acting MS Contin 15mg Q12H and transitioned to MS IR 7 5mg Q6H PRN > pain still adequately controlled > monitor closely   · Gabapentin 100mg BID   · Cymbalta 60 milligrams twice daily  · Acetaminophen 650 mg every 6 hours as needed  · Was previously on oxycodone "as needed at [facility] rehab but per family due to concerns for polypharmacy and additional opioids beyond her morphine has not been on an acute care since 2/16  · Lidoderm and Bengay  Aqua K also ordered with instructions not to place over same area  Appreciate IM consultants medical co-management  Labs, medications, and imaging personally reviewed  ROS:  A ten point review of systems was completed on 03/17/23 and pertinent positives are listed in subjective section  All other systems reviewed were negative  OBJECTIVE:   /74 (BP Location: Left arm)   Pulse 95   Temp 97 8 °F (36 6 °C) (Axillary)   Resp 18   Ht 5' 3\" (1 6 m)   Wt 59 2 kg (130 lb 8 2 oz)   SpO2 98%   BMI 23 12 kg/m²     Physical Exam  Vitals and nursing note reviewed  Constitutional:       General: She is not in acute distress  HENT:      Head: Atraumatic  Comments: Left facial droop     Right Ear: External ear normal       Left Ear: External ear normal       Nose: Nose normal  No rhinorrhea  Mouth/Throat:      Mouth: Mucous membranes are moist       Pharynx: Oropharynx is clear  Eyes:      General: No scleral icterus  Cardiovascular:      Rate and Rhythm: Normal rate  Pulses: Normal pulses  Heart sounds: Normal heart sounds  Pulmonary:      Effort: Pulmonary effort is normal  No respiratory distress  Breath sounds: Normal breath sounds  Abdominal:      General: There is no distension  Palpations: Abdomen is soft  Musculoskeletal:      Right lower leg: No edema  Left lower leg: No edema  Skin:     General: Skin is warm and dry  Neurological:      Mental Status: She is oriented to person, place, and time  Sensory: No sensory deficit        Comments: Left hemiparesis and dysarthria, some neglect   Psychiatric:         Mood and Affect: Mood normal          Behavior: Behavior normal           Lab Results   Component Value Date    WBC 7 25 03/13/2023    HGB 12 2 " 03/13/2023    HCT 38 8 03/13/2023     (H) 03/13/2023     03/13/2023     Lab Results   Component Value Date    SODIUM 141 03/16/2023    K 3 8 03/16/2023     (H) 03/16/2023    CO2 28 03/16/2023    BUN 16 03/16/2023    CREATININE 0 91 03/16/2023    GLUC 104 03/16/2023    CALCIUM 9 1 03/16/2023     Lab Results   Component Value Date    INR 0 95 02/22/2023    INR 0 90 02/12/2023    INR 1 12 04/22/2022    PROTIME 12 8 02/22/2023    PROTIME 12 1 02/12/2023    PROTIME 14 0 04/22/2022           Current Facility-Administered Medications:   •  acetaminophen (TYLENOL) tablet 650 mg, 650 mg, Oral, Q6H PRN, Zada Doing, DO, 650 mg at 03/14/23 1853  •  albuterol (PROVENTIL HFA,VENTOLIN HFA) inhaler 2 puff, 2 puff, Inhalation, Q6H PRN, Zada Doing, DO, 2 puff at 03/07/23 1358  •  aluminum-magnesium hydroxide-simethicone (MYLANTA) oral suspension 30 mL, 30 mL, Oral, Q4H PRN, Zada Doing, DO, 30 mL at 03/16/23 1516  •  apixaban (ELIQUIS) tablet 5 mg, 5 mg, Oral, BID, Zada Doing, DO, 5 mg at 03/17/23 2927  •  atorvastatin (LIPITOR) tablet 40 mg, 40 mg, Oral, QPM, Zada Doing, DO, 40 mg at 03/16/23 8287  •  bisacodyl (DULCOLAX) rectal suppository 10 mg, 10 mg, Rectal, Once, Francisco Javier Pardo MD  •  buPROPion [facility]) tablet 100 mg, 100 mg, Oral, BID, Zada Doing, DO, 100 mg at 03/17/23 6752  •  cinacalcet (SENSIPAR) tablet 30 mg, 30 mg, Oral, Daily With Breakfast, Anika Mehta MD, 30 mg at 03/17/23 0848  •  DULoxetine (CYMBALTA) delayed release capsule 60 mg, 60 mg, Oral, BID, Eusebio Doing, DO, 60 mg at 03/17/23 2039  •  gabapentin (NEURONTIN) capsule 100 mg, 100 mg, Oral, BID, Francisco Javier Pardo MD, 100 mg at 03/17/23 0847  •  glycerin-hypromellose- (ARTIFICIAL TEARS) ophthalmic solution 1 drop, 1 drop, Both Eyes, Q4H PRN, Eusebio Doing, DO, 1 drop at 03/14/23 2030  •  hydroxychloroquine (PLAQUENIL) tablet 200 mg, 200 mg, Oral, Once per day on Mon Tue Wed Thu Fri, Meenu Guerrier DO Soheila, 200 mg at 03/17/23 0847  •  hydroxychloroquine (PLAQUENIL) tablet 400 mg, 400 mg, Oral, Once per day on Sun Sat, Carloz DELEON DO Soheila, 400 mg at 03/12/23 4198  •  lamoTRIgine (LaMICtal) tablet 150 mg, 150 mg, Oral, HS, Lisa Gerhard, DO, 150 mg at 03/16/23 2157  •  lidocaine (LIDODERM) 5 % patch 1 patch, 1 patch, Topical, Daily, Jean Whatley MD, 1 patch at 03/17/23 5284  •  LORazepam (ATIVAN) tablet 0 25 mg, 0 25 mg, Oral, BID PRN, Sanchez Rocha MD, 0 25 mg at 03/11/23 1054  •  melatonin tablet 3 mg, 3 mg, Oral, HS, LAURA Rutledge, 3 mg at 03/16/23 2157  •  menthol-methyl salicylate (BENGAY) 13-03 % cream, , Apply externally, 4x Daily PRN, Jean Whatley MD, Given at 03/06/23 1227  •  metoprolol succinate (TOPROL-XL) 24 hr tablet 25 mg, 25 mg, Oral, BID, LAURA Morin, 25 mg at 03/17/23 0948  •  morphine (MSIR) IR tablet 7 5 mg, 7 5 mg, Oral, Q6H PRN, Sanchez Rocha MD, 7 5 mg at 03/17/23 0849  •  ondansetron (ZOFRAN-ODT) dispersible tablet 4 mg, 4 mg, Oral, Q6H PRN, Lisa Hennessy DO, 4 mg at 03/16/23 0598  •  pantoprazole (PROTONIX) EC tablet 40 mg, 40 mg, Oral, Early Morning, Hasmukh Saunders MD, 40 mg at 03/17/23 1330  •  polyethylene glycol (MIRALAX) packet 17 g, 17 g, Oral, Daily PRN, Lisa Hennessy, DO, 17 g at 03/04/23 1930  •  prazosin (MINIPRESS) capsule 1 mg, 1 mg, Oral, HS, Lisa Hennessy, DO, 1 mg at 03/15/23 2110  •  senna-docusate sodium (SENOKOT S) 8 6-50 mg per tablet 1 tablet, 1 tablet, Oral, BID, Lisa Hennessy DO, 1 tablet at 03/16/23 0737  •  sodium chloride (OCEAN) 0 65 % nasal spray 1 spray, 1 spray, Each Nare, Q1H PRN, Agata Ray MD, 1 spray at 03/08/23 0300    Past Medical History:   Diagnosis Date   • Anxiety    • Asthma    • Bipolar depression ([facility] [address] )    • Chronic narcotic dependence (HCC)    • Chronic pain    • Depression    • Enterovirus heart infection    • SHAR (obstructive sleep apnea)    • Osteoarthritis    • Peripheral neuropathy    • Plantar fasciitis of right foot    • Senile osteoporosis    • Seronegative arthropathy of multiple sites [facility])    • Undifferentiated connective tissue disease [facility])        Patient Active Problem List    Diagnosis Date Noted   • Acute CVA (cerebrovascular accident) ([facility] ) 02/23/2023   • Hypokalemia 03/10/2023   • AMS (altered mental status) 03/09/2023   • Gout of foot 03/09/2023   • Dysphagia 03/09/2023   • Wound of left ankle 03/09/2023   • UTI (urinary tract infection) 03/06/2023   • Obstructive sleep apnea 02/28/2023   • History of stroke 02/28/2023   • History of bacteremia 02/24/2023   • Hypercalcemia 02/22/2023   • Discoloration of skin of foot 02/22/2023   • Abdominal pain 02/22/2023   • HFrEF (heart failure with reduced ejection fraction) ([facility] ) 02/13/2023   • Stroke ([facility] ) 02/12/2023   • HTN (hypertension) 02/12/2023   • Cardiomyopathy ([facility] ) 02/07/2023   • Vitamin D insufficiency 02/07/2023   • LAILA (acute kidney injury) ([facility] ) 04/23/2022   • Polypharmacy 04/23/2022   • 'light-for-dates' infant with signs of fetal malnutrition 04/22/2022   • Bipolar depression ([facility] ) 03/04/2022   • Rheumatoid arthritis ([facility] ) 03/04/2022   • Closed fracture of multiple ribs of right side 03/02/2022   • Traumatic pneumothorax 03/02/2022   • Closed fracture of transverse process of lumbar vertebra ([facility] ) 03/02/2022   • Liver contusion 03/02/2022   • Fall 03/02/2022   • Acute pain due to trauma 03/02/2022   • Right shoulder pain 03/02/2022   • Severe protein-calorie malnutrition ([facility] ) 03/02/2022   • Undifferentiated connective tissue disease ([facility] ) 12/29/2021   • Primary generalized (osteo)arthritis 12/29/2021   • Peripheral neuropathy 12/29/2021   • Osteoporosis 12/29/2021   • Hx of compression fracture of spine 12/29/2021   • Lumbar spondylosis 12/29/2021   • Cervical spondylosis 12/29/2021   • Anemia 06/26/2021   • Chronic pain syndrome    • Asthma    • Ambulatory dysfunction    • Seronegative arthropathy of multiple sites [facility])    • Gastroesophageal reflux disease without esophagitis    • Mixed stress and urge urinary incontinence    • CHF (congestive heart failure) ([facility] )         Kelsey Colunga DO  Physical Medicine and Priyanka Rice    I have spent a total time of 30 minutes on 03/17/23 in caring for this patient including Instructions for management, Impressions, Counseling / Coordination of care, Documenting in the medical record and Communicating with other healthcare professionals

## 2023-03-18 RX ORDER — LANOLIN ALCOHOL/MO/W.PET/CERES
CREAM (GRAM) TOPICAL 3 TIMES DAILY
Status: DISCONTINUED | OUTPATIENT
Start: 2023-03-18 | End: 2023-03-25 | Stop reason: HOSPADM

## 2023-03-18 RX ADMIN — MELATONIN 3 MG: at 21:19

## 2023-03-18 RX ADMIN — MORPHINE SULFATE 7.5 MG: 15 TABLET ORAL at 19:32

## 2023-03-18 RX ADMIN — BUPROPION HYDROCHLORIDE TABLETS 100 MG: 100 TABLET, FILM COATED ORAL at 08:51

## 2023-03-18 RX ADMIN — ACETAMINOPHEN 650 MG: 325 TABLET ORAL at 08:52

## 2023-03-18 RX ADMIN — DULOXETINE HYDROCHLORIDE 60 MG: 60 CAPSULE, DELAYED RELEASE ORAL at 21:18

## 2023-03-18 RX ADMIN — LIDOCAINE 5% 1 PATCH: 700 PATCH TOPICAL at 08:55

## 2023-03-18 RX ADMIN — BUPROPION HYDROCHLORIDE TABLETS 100 MG: 100 TABLET, FILM COATED ORAL at 17:28

## 2023-03-18 RX ADMIN — HYDROXYCHLOROQUINE SULFATE 400 MG: 200 TABLET ORAL at 08:50

## 2023-03-18 RX ADMIN — GABAPENTIN 100 MG: 100 CAPSULE ORAL at 08:53

## 2023-03-18 RX ADMIN — ONDANSETRON 4 MG: 4 TABLET, ORALLY DISINTEGRATING ORAL at 15:18

## 2023-03-18 RX ADMIN — MORPHINE SULFATE 7.5 MG: 15 TABLET ORAL at 03:55

## 2023-03-18 RX ADMIN — APIXABAN 5 MG: 5 TABLET, FILM COATED ORAL at 17:23

## 2023-03-18 RX ADMIN — CINACALCET 30 MG: 30 TABLET, FILM COATED ORAL at 08:49

## 2023-03-18 RX ADMIN — APIXABAN 5 MG: 5 TABLET, FILM COATED ORAL at 08:54

## 2023-03-18 RX ADMIN — GABAPENTIN 100 MG: 100 CAPSULE ORAL at 17:24

## 2023-03-18 RX ADMIN — Medication 1 APPLICATION.: at 21:20

## 2023-03-18 RX ADMIN — ALUMINUM HYDROXIDE, MAGNESIUM HYDROXIDE, AND DIMETHICONE 30 ML: 200; 20; 200 SUSPENSION ORAL at 17:26

## 2023-03-18 RX ADMIN — METOPROLOL SUCCINATE 25 MG: 25 TABLET, EXTENDED RELEASE ORAL at 17:23

## 2023-03-18 RX ADMIN — DULOXETINE HYDROCHLORIDE 60 MG: 60 CAPSULE, DELAYED RELEASE ORAL at 08:53

## 2023-03-18 RX ADMIN — Medication 1 APPLICATION.: at 17:26

## 2023-03-18 RX ADMIN — LAMOTRIGINE 150 MG: 100 TABLET ORAL at 21:19

## 2023-03-18 RX ADMIN — PANTOPRAZOLE SODIUM 40 MG: 40 TABLET, DELAYED RELEASE ORAL at 05:48

## 2023-03-18 RX ADMIN — ATORVASTATIN CALCIUM 40 MG: 40 TABLET, FILM COATED ORAL at 17:24

## 2023-03-18 NOTE — PROGRESS NOTES
"Occupational Therapy Treatment Note             03/18/23 1425   Pain Assessment   Pain Assessment Tool 0-10   Pain Score 8   Pain Location/Orientation Location: Back   Hospital Pain Intervention(s) Repositioned   Restrictions/Precautions   Precautions Aphasia; Aspiration;Bed/chair alarms;Cognitive; Fall Risk;Supervision on toilet/commode  (L inattention)   Lifestyle   Autonomy \"I would like to rest then\"   Sit to Lying   Type of Assistance Needed Physical assistance   Physical Assistance Level 25% or less   Sit to Lying CARE Score 3   Sit to Stand   Type of Assistance Needed Physical assistance; Adaptive equipment   Physical Assistance Level 25% or less   Comment RW   Sit to Stand CARE Score 3   Bed-Chair Transfer   Type of Assistance Needed Physical assistance; Adaptive equipment   Physical Assistance Level 25% or less   Comment RW to/from bathroom   Chair/Bed-to-Chair Transfer CARE Score 3   Toileting Hygiene   Type of Assistance Needed Physical assistance; Adaptive equipment   Physical Assistance Level 26%-50%   Comment in stance pt completes clothing management with min assist 2* L inattention  also requires min assist for standing balance during front hygiene   Toileting Hygiene CARE Score 3   Toilet Transfer   Type of Assistance Needed Physical assistance; Adaptive equipment   Physical Assistance Level 25% or less   Comment to/from [facility] over toilet using RW and R grab bar for sit <> stand   Toilet Transfer CARE Score 3   Cognition   Overall Cognitive Status Impaired   Arousal/Participation Alert   Attention Attends with cues to redirect   Orientation Level Oriented to person;Oriented to place;Oriented to situation   Memory Decreased short term memory   Following Commands Follows one step commands with increased time or repetition   Activity Tolerance   Activity Tolerance Patient tolerated treatment well   Assessment   Treatment Assessment Pt participated in skilled OT tx session   See above for further details on " functional performance  Pt demonstrating progress in functional mobility/transfers using RW, performing at min assist today  However L inattention remains significant during ADL routine, requires cues for clothing management, L hand placement on RW, mobility using RW  Pt would benefit from visual anchors on L environment in room to increase mobility  Plan to utilize post-it notes to assist with L inattention to environment  Pt will continue to benefit from skilled OT intervention to address L attention training, cognitive retraining focused on attention, safety, and short term memory, standing balance/tolerance, safe use of RW in order to maximize functional independence in ADLS, functional mobility/transfers, while decreasing burden of care  Pt left positioned in bed with call bell in reach and middle bed alarm on  Prognosis Fair   Problem List Decreased strength;Decreased range of motion;Decreased endurance;Decreased mobility; Impaired balance;Decreased coordination;Decreased cognition; Impaired judgement;Decreased safety awareness;Orthopedic restrictions;Pain   Barriers to Discharge Decreased caregiver support; Inaccessible home environment   Plan   Treatment/Interventions ADL retraining;Functional transfer training; Therapeutic exercise; Endurance training;Cognitive reorientation;Patient/family training;Equipment eval/education; Bed mobility; Compensatory technique education   Progress Progressing toward goals   Recommendation   OT Discharge Recommendation Post acute rehabilitation services   OT Therapy Minutes   OT Time In 1425   OT Time Out 1440   OT Total Time (minutes) 15   OT Mode of treatment - Individual (minutes) 15   OT Mode of treatment - Concurrent (minutes) 0   OT Mode of treatment - Group (minutes) 0   OT Mode of treatment - Co-treat (minutes) 0   OT Mode of Treatment - Total time(minutes) 15 minutes   OT Cumulative Minutes 7548   Therapy Time missed   Time missed?  No

## 2023-03-18 NOTE — PROGRESS NOTES
Called in to pt  room  Pt  c/o numbness around her lips and difficulty speaking resolved within a few minutes  Pt 's brother at bedside arguing with pt  prior to incident  Pt  verbalized being upset  VS stable  Neuro assessment unchanged since this am  Dr Juan Carlos Du made aware of the above findings  Will continue to monitor pt

## 2023-03-18 NOTE — PROGRESS NOTES
Internal Medicine Progress Note  Patient: Eli Rao  Age/sex: 75 y o  female  Medical Record #: [id number]      ASSESSMENT/PLAN: (Interval History)  Eli Rao is seen and examined and management for following issues:    Recent right MCA infarct W/petechial hemorrhage  • S/P thrombectomy by Dr Jonh Zavaleta 2/12  • Continue Eliquis 5 mg twice daily/statin   • Follow-up with neurosurgery/neurology as scheduled     NICM  • EF has been as low as 10 to 15% in the past  • In 2022 was 65%  • Now down to 30% = pt declined LifeVest  • Cardiology felt this was secondary to her recent CVA as well as possible medication noncompliance  • They recommend continuing beta-blocker and adding ARB when BP allows  • Will need outpatient ischemia work-up  • Followed by Dr Jayashree Sarmiento at [facility] however would like to follow with [facility] on dc  • Can add arb if bp improves     Seronegative rheumatoid arthritis  • A/T rheumatology takes Plaquenil 200 mg Monday through Friday 400 mg on Saturday and Sunday  • Continue same  • Follow-up on discharge     Hypercalcemia  · Seen by renal/endocrine on acute side  · Started on Sensipar and now down to 9 5  · Endocrine input appreciated  Will need outpt follow-up and potentially a surgical evaluation  Fall  · 3/6/23 onto left side  · Abrasion on left knee and Xray negative for fracture  · Could not verify whether or not she struck her head  · CT of head stable  · She also c/o L hip pain  X-ray negative for fx  · Fall precautions all in place       R great toe gout  · S/p prednisone  · Uric acid normal  · improved     Chronic pain  • Due to compression fractures of her thoracic spine/chronic rib fractures  • Lethargic 3/7 and MS contin changed to IR  • Generally takes about 1-2 doses daily  • Pain management per PMR     Bipolar disorder  • Continue current medication regimen  • Follow-up with psychiatry on discharge  • Neuropsych following    Anxiety  · Cont current meds  · Moods are stable    Confusion  · Improved  · Off continuous observation    SHAR  • Does not use CPAP at home d/t claustraphobia  • OK use oxygen overnight     Dysuria/Urinary frequency/incontinence  • Urine culture +CRE  • s/p vantin 200mg bid through 3/9/23  • resolved    Hypokalemia  · Improved after replacement  · Repleted kcl 40 meq daily x 2  · K 3 8  · BMP stable  · Labs monday DC planning: 3/22/23=likely SNF       The above assessment and plan was reviewed and updated as determined by my evaluation of the patient on 3/18/2023      Labs:   Results from last 7 days   Lab Units 03/13/23  0612   WBC Thousand/uL 7 25   HEMOGLOBIN g/dL 12 2   HEMATOCRIT % 38 8   PLATELETS Thousands/uL 172     Results from last 7 days   Lab Units 03/16/23  0705 03/13/23  0612   SODIUM mmol/L 141 138   POTASSIUM mmol/L 3 8 3 8   CHLORIDE mmol/L 112* 113*   CO2 mmol/L 28 22   BUN mg/dL 16 13   CREATININE mg/dL 0 91 0 87   CALCIUM mg/dL 9 1 9 5                   Review of Scheduled Meds:  Current Facility-Administered Medications   Medication Dose Route Frequency Provider Last Rate   • acetaminophen  650 mg Oral Q6H PRN Marleen Christopher DO     • albuterol  2 puff Inhalation Q6H PRN Marleen Christopher DO     • aluminum-magnesium hydroxide-simethicone  30 mL Oral Q4H PRN Marleen Christopher DO     • apixaban  5 mg Oral BID Marleen Christopher DO     • atorvastatin  40 mg Oral QPM Marleen Christopher DO     • bisacodyl  10 mg Rectal Once Zina Jimenes MD     • buPROPion  100 mg Oral BID Marleen Christopher DO     • cinacalcet  30 mg Oral Daily With Breakfast Anika Alicia MD     • DULoxetine  60 mg Oral BID Marleen Christopher DO     • gabapentin  100 mg Oral BID Zina Jimenes MD     • glycerin-hypromellose-  1 drop Both Eyes Q4H PRN Marleen Christopher DO     • hydroxychloroquine  200 mg Oral Once per day on Mon Tue Wed Thu Fri Marleen Christopher DO     • hydroxychloroquine  400 mg Oral Once per day on Sun Sat Marleen Christopher DO     • lamoTRIgine  150 mg Oral HS Garrie Areola, DO     • lidocaine  1 patch Topical Daily Dionne Rosario MD     • LORazepam  0 25 mg Oral BID PRN Radha Murrieta MD     • melatonin  3 mg Oral HS LAURA Eduardo     • menthol-methyl salicylate   Apply externally 4x Daily PRN Dionne Rosario MD     • metoprolol succinate  25 mg Oral BID Noretta LAURA Alves     • morphine  7 5 mg Oral Q6H PRN Radha Murrieta MD     • ondansetron  4 mg Oral Q6H PRN Garrie Areola, DO     • pantoprazole  40 mg Oral Early Morning Ascencion Sacks, MD     • polyethylene glycol  17 g Oral Daily PRN Garrie Areola, DO     • prazosin  1 mg Oral HS Garrie Areola, DO     • senna-docusate sodium  1 tablet Oral BID Garrie Areola, DO     • sodium chloride  1 spray Each Nare Q1H PRN Mir Arango MD         Subjective/ HPI: Patient seen and examined  Patients overnight issues or events were reviewed with nursing or staff during rounds or morning huddle session  New or overnight issues include the following:     Pt seen in OT  She states that she is doing well  She denies any current complaints  ROS:   A 10 point ROS was performed; negative except as noted above  Imaging:     FL barium swallow video w speech   Final Result by SYSTEMGENERATED, DOCUMENTATION (03/09 1306)      FL barium swallow video w speech   Final Result by  (03/09 1550)      XR hip/pelv 2-3 vws left if performed   Final Result by Magdalena Burk MD (03/08 1633)      No acute osseous abnormality  Workstation performed: [id number]         XR shoulder 2+ vw left   Final Result by Magdalena Burk MD (03/08 1635)      No acute osseous abnormality  Workstation performed: [id number]         XR spine cervical 2 or 3 vw injury   Final Result by Magdalena Burk MD (03/08 1643)      No acute osseous abnormality or significant change from priors        Salt-and-pepper appearance of the skull is partially accentuated by the image processing /sharpening algorithm, though prior head CT suggests this is a true finding  Correlate for clinical/laboratory features of hyperparathyroidism  Workstation performed: [id number]         XR chest portable   Final Result by Riri Ordoñez MD (03/07 1650)      No acute cardiopulmonary disease  Workstation performed: [id number]         CT head wo contrast   Final Result by Humaira Nuno MD (03/07 1358)      Evolving right MCA territory infarcts with associated petechial hemorrhage which is best seen on the recent MRI examination  There is no discrete parenchymal hematoma  Workstation performed: [id number]         XR knee 1 or 2 vw left   Final Result by Riri Ordoñez MD (03/07 5147)      No acute osseous abnormality              Workstation performed: [id number]             *Labs /Radiology studies reviewed  *Medications reviewed and reconciled as needed  *Please refer to order section for additional ordered labs studies  *Case discussed with primary attending during morning huddle case rounds    Physical Examination:  Vitals:   Vitals:    03/17/23 1755 03/17/23 1915 03/17/23 2113 03/18/23 0542   BP: 115/67 105/60 103/53 111/64   BP Location: Right arm Right arm Right arm Right arm   Pulse: 93 95 94 81   Resp: 18 18 20 20   Temp:   97 6 °F (36 4 °C) 97 5 °F (36 4 °C)   TempSrc:   Oral Axillary   SpO2: 98% 98% 95% 97%   Weight:       Height:           GEN: No apparent distress, interactive  NEURO: Alert and oriented x3; left neglect, cognitive issues as well as recall issues however overall improvement noted, ongoing mild dysarthria  HEENT: Pupils are equal and reactive, EOMI, mucous membranes are moist, face asymmetrical  CV: S1 S2 regular, no MRG, no peripheral edema noted  RESP: Lungs are decreased, no wheezes, rales or rhonchi noted, on room air requires oxygen with sleep, respirations easy and non labored  GI: Flat, soft non tender, non distended; +BS x4  : Voiding without difficulty, incontinent at times  MUSC: Moves all extremities; stable Lt hemiparesis  SKIN: pink, dry skin, poor turgor, b/l LE with dressings in place for both prevention/current wounds         The above physical exam was reviewed and updated as determined by my evaluation of the patient on 3/18/2023  Invasive Devices     None                    VTE Pharmacologic Prophylaxis: Eliquis  Code Status: Level 1 - Full Code  Current Length of Stay: 18 day(s)      Total time spent:  30 minutes  with more than 50% spent counseling/coordinating care  Counseling includes discussion with patient re: progress  and discussion with patient of his/her current medical state/information  Coordination of patient's care was performed in conjunction with primary service  Time invested included review of patient's labs, vitals, and management of their comorbidities with continued monitoring  In addition, this patient was discussed with medical team including physician and advanced extenders  The care of the patient was extensively discussed and appropriate treatment plan was formulated unique for this patient  Medical decision making for the day was made by supervising physician unless otherwise noted in their attestation statement  ** Please Note:  voice to text software may have been used in the creation of this document   Although proof errors in transcription or interpretation are a potential of such software**

## 2023-03-18 NOTE — PROGRESS NOTES
"   03/18/23 0830   Pain Assessment   Pain Assessment Tool FLACC   Pain Score 9   Pain Location/Orientation Location: Back   Pain Onset/Description Descriptor: Stabbing; Descriptor: Throbbing   Effect of Pain on Daily Activities Pt able to tolerate PT session   Hospital Pain Intervention(s) Medication (See MAR); Rest  (RN notified and tylenol given)   Pain Rating: FLACC (Rest) - Legs 0   Pain Rating: FLACC (Rest) - Activity 0   Pain Rating: FLACC (Rest) - Cry 0   Pain Rating: FLACC (Rest) - Consolability 0   Pain Rating: FLACC (Activity) - Legs 0   Pain Rating: FLACC (Activity) - Activity 0   Pain Rating: FLACC (Activity) - Cry 0   Pain Rating: FLACC (Activity) - Consolability 0   Restrictions/Precautions   Precautions Aphasia; Aspiration;Bed/chair alarms;Cognitive; Fall Risk;Contact/isolation;Pain  (nectar thick liquids)   Weight Bearing Restrictions No   General   Change In Medical/Functional Status Pt off 1:1 as of 3/17/23   Cognition   Overall Cognitive Status Impaired   Arousal/Participation Alert; Responsive; Cooperative   Attention Attends with cues to redirect   Orientation Level Oriented X4   Following Commands Follows one step commands without difficulty   Subjective   Subjective \"My back pain is from how I sleep at night\"  Pt awake and agreed to therapy  Sit to Stand   Type of Assistance Needed Verbal cues; Physical assistance   Physical Assistance Level 25% or less   Comment RW; pt required min A due to increased back pain and demonstrating retro weight shift to stand; pt VC to scoot more to edge of chair to avoid excessive forward flexion   Sit to Stand CARE Score 3   Bed-Chair Transfer   Type of Assistance Needed Verbal cues; Physical assistance   Physical Assistance Level 25% or less   Comment Min A with RW secondary to increased back pain; Required increased VC with hand placement and to stay close to RW while turning to sit for safety   Chair/Bed-to-Chair Transfer CARE Score 3   Car Transfer   Type of " Assistance Needed Verbal cues; Physical assistance   Physical Assistance Level 25% or less   Comment Min A with RW; Required VC for hand placement and sequencing   Car Transfer CARE Score 3   Walk 10 Feet   Type of Assistance Needed Verbal cues; Physical assistance   Physical Assistance Level 25% or less   Comment VC to keep walking close and for hand placement to keep L hand on RW handle   Walk 10 Feet CARE Score 3   Walk 50 Feet with Two Turns   Type of Assistance Needed Physical assistance;Verbal cues   Physical Assistance Level 25% or less   Comment VC to keep walking close and for hand placement to keep L hand on RW handle   Walk 50 Feet with Two Turns CARE Score 3   Walking 10 Feet on Uneven Surfaces   Type of Assistance Needed Physical assistance;Verbal cues   Physical Assistance Level 26%-50%   Comment Min A over foam mat w/ RW; required VC for sequencing with RW   Walking 10 Feet on Uneven Surfaces CARE Score 3   Ambulation   Assist Device Walker   Gait Pattern Decreased foot clearance; Forward Flexion;L hemiparesis; Narrow AYLA   Limitations Noted In Balance; Endurance; Heel Strike;Posture; Safety;Strength   Provided Assistance with: Balance;Direction   Walk Assist Level Minimum Assist   Findings 1x10 ft, 1x20 ft, 2x50ft with RW   Does the patient walk? 2  Yes   Wheel 50 Feet with Two Turns   Type of Assistance Needed Physical assistance;Verbal cues   Physical Assistance Level 25% or less   Comment utilizes bilat LE and R UE; VC to continue to scan L environment to avoid hitting any obstacles   Wheel 50 Feet with Two Turns CARE Score 3   Wheel 150 Feet   Type of Assistance Needed Physical assistance;Verbal cues   Physical Assistance Level 25% or less   Comment utilizes bilat LE and R UE; VC to continue to scan L environment to avoid hitting any obstacles due to increase distraction   Wheel 150 Feet CARE Score 3   Wheelchair mobility   Does the patient use a wheelchair? 1  Yes   Type of Wheelchair Used 1   Manual Curb or Single Stair   Style negotiated Single stair   Type of Assistance Needed Physical assistance;Verbal cues   Physical Assistance Level 25% or less   Comment Required VC for sequencing with RW and LEs   1 Step (Curb) CARE Score 3   4 Steps   Type of Assistance Needed Physical assistance;Verbal cues   Physical Assistance Level 51%-75%   Comment Min A for ascending stairs, Mod A for descending stairs bwd; step-to pattern; VC for sequencing and safety awareness with foot placement for L LE    4 Steps CARE Score 2   12 Steps   Reason if not Attempted Safety concerns  (decreased endurance)   12 Steps CARE Score 88   Stairs   Type Stairs   # of Steps 6   Picking Up Object   Type of Assistance Needed Verbal cues; Incidental touching   Physical Assistance Level 25% or less   Comment w/ RW; picked up pen and paper towel using reacher with RUE; able to bring paper towel to LUE however unable to grasp paper towel with L hand due to weakness   Picking Up Object CARE Score 3   Toilet Transfer   Type of Assistance Needed Physical assistance   Physical Assistance Level 25% or less   Comment RW; Min A secondary to back pain; required VC for hand placement with sitting   Toilet Transfer CARE Score 3   Therapeutic Interventions   Strengthening Seated: LAQ 2x10 with 5 sec holds, Marches 3x10 alternating with tactile cues to bring LEs higher   Neuromuscular Re-Education cone weaving in w/c, utilizing L hand to assist with drinking   Assessment   Treatment Assessment Pt tolerated 90 min of skilled PT focusing on wheelchair training, gait training, NMR, LE strengthening, and functional activities  Pt in significant amount of low back pain which required increased assistance with interventions  BP was lower than usual which fluids were encouraged throughout session - pt was able to drink [id number] of thickened water this session  Pt reported no c/o of dizziness or lightheadedness with sitting or standing   Pt required increased VC for hand placement with transfers/RW and for sequencing on stairs  Pt was able to keep LUE on railing for stair training with good hand placement  Pt had difficulty with cone weaving in w/c with L turns due to L inattention  PT to continue focusing on stair negotiation and proper sequence, NMR with environmental scanning, w/c and gait training, and safety awareness  Pt requested to be left in w/c with chair alarm activated, LEs on leg rests, and all needs within reach  Problem List Decreased strength;Decreased endurance; Impaired balance;Decreased mobility; Decreased coordination;Decreased cognition; Impaired judgement;Decreased safety awareness;Pain   Plan   Treatment/Interventions Functional transfer training;LE strengthening/ROM; Therapeutic exercise; Endurance training;Cognitive reorientation;Patient/family training;Equipment eval/education; Bed mobility;Gait training; Compensatory technique education   Progress Progressing toward goals   Recommendation   Equipment Recommended Walker; Wheelchair   PT Therapy Minutes   PT Time In 0830   PT Time Out 1000   PT Total Time (minutes) 90   PT Mode of treatment - Individual (minutes) 60   PT Mode of treatment - Concurrent (minutes) 30   PT Mode of treatment - Group (minutes) 0   PT Mode of treatment - Co-treat (minutes) 0   PT Mode of Treatment - Total time(minutes) 90 minutes   PT Cumulative Minutes 3485

## 2023-03-18 NOTE — PLAN OF CARE
Problem: PAIN - ADULT  Goal: Verbalizes/displays adequate comfort level or baseline comfort level  Description: Interventions:  - Encourage patient to monitor pain and request assistance  - Assess pain using appropriate pain scale  - Administer analgesics based on type and severity of pain and evaluate response  - Implement non-pharmacological measures as appropriate and evaluate response  - Consider cultural and social influences on pain and pain management  - Notify physician/advanced practitioner if interventions unsuccessful or patient reports new pain  Outcome: Progressing     Problem: INFECTION - ADULT  Goal: Absence or prevention of progression during hospitalization  Description: INTERVENTIONS:  - Assess and monitor for signs and symptoms of infection  - Monitor lab/diagnostic results  - Monitor all insertion sites, i e  indwelling lines, tubes, and drains  - Monitor endotracheal if appropriate and nasal secretions for changes in amount and color  - [place] appropriate cooling/warming therapies per order  - Administer medications as ordered  - Instruct and encourage patient and family to use good hand hygiene technique  - Identify and instruct in appropriate isolation precautions for identified infection/condition  Outcome: Progressing  Goal: Absence of fever/infection during neutropenic period  Description: INTERVENTIONS:  - Monitor WBC    Outcome: Progressing     Problem: SAFETY ADULT  Goal: Patient will remain free of falls  Description: INTERVENTIONS:  - Educate patient/family on patient safety including physical limitations  - Instruct patient to call for assistance with activity   - Consult OT/PT to assist with strengthening/mobility   - Keep Call bell within reach  - Keep bed low and locked with side rails adjusted as appropriate  - Keep care items and personal belongings within reach  - Initiate and maintain comfort rounds  - Make Fall Risk Sign visible to staff  - Apply yellow socks and bracelet for high fall risk patients  - Consider moving patient to room near nurses station  Outcome: Progressing  Goal: Maintain or return to baseline ADL function  Description: INTERVENTIONS:  -  Assess patient's ability to carry out ADLs; assess patient's baseline for ADL function and identify physical deficits which impact ability to perform ADLs (bathing, care of mouth/teeth, toileting, grooming, dressing, etc )  - Assess/evaluate cause of self-care deficits   - Assess range of motion  - Assess patient's mobility; develop plan if impaired  - Assess patient's need for assistive devices and provide as appropriate  - Encourage maximum independence but intervene and supervise when necessary  - Involve family in performance of ADLs  - Assess for home care needs following discharge   - Consider OT consult to assist with ADL evaluation and planning for discharge  - Provide patient education as appropriate  Outcome: Progressing  Goal: Maintains/Returns to pre admission functional level  Description: INTERVENTIONS:  - Perform BMAT or MOVE assessment daily    - Set and communicate daily mobility goal to care team and patient/family/caregiver     - Collaborate with rehabilitation services on mobility goals if consulted  - Out of bed for toileting  - Record patient progress and toleration of activity level   Outcome: Progressing     Problem: MOBILITY - ADULT  Goal: Maintain or return to baseline ADL function  Description: INTERVENTIONS:  -  Assess patient's ability to carry out ADLs; assess patient's baseline for ADL function and identify physical deficits which impact ability to perform ADLs (bathing, care of mouth/teeth, toileting, grooming, dressing, etc )  - Assess/evaluate cause of self-care deficits   - Assess range of motion  - Assess patient's mobility; develop plan if impaired  - Assess patient's need for assistive devices and provide as appropriate  - Encourage maximum independence but intervene and supervise when necessary  - Involve family in performance of ADLs  - Assess for home care needs following discharge   - Consider OT consult to assist with ADL evaluation and planning for discharge  - Provide patient education as appropriate  Outcome: Progressing  Goal: Maintains/Returns to pre admission functional level  Description: INTERVENTIONS:  - Perform BMAT or MOVE assessment daily    - Set and communicate daily mobility goal to care team and patient/family/caregiver     - Collaborate with rehabilitation services on mobility goals if consulted  - Out of bed for toileting  - Record patient progress and toleration of activity level   Outcome: Progressing

## 2023-03-18 NOTE — NURSING NOTE
Pt requesting to walk w/ staff  Based on board pt is allowed to ambulate to bathroom and back w/ RW     pt educated she is not cleared to walk in the halls w/ nursing staff at this time  PCA walked w/ pt to and from door of her room w/o incident

## 2023-03-18 NOTE — PROGRESS NOTES
"PM&R Coverage Progress Note:    Rehab Diagnosis: Impairment of mobility, safety, Activities of Daily Living (ADLs), and cognitive/communication skills due to Stroke:  01 1  Left Body Involvement (Right Brain)    ASSESSMENT: Stable      PLAN:    Rehabilitation  • Continue current rehabilitation plan of care to maximize function  • No funcitonal barriers identified    Medical issues  • CVA: Continues on Eliquis and Lipitor  o Patient with transient episode 3/17/23 while \" having an argument with her brother\" experiencing tingling around her lips  Neuro exam stable  No CTH needed as complete resolution once argumentation ceased  Patient and brother aware stress is a risk factor for stroke - future visitations encouraged to AVOID argumentation/increased stress  Brother is aware and agreeable  • Mood is stable  Continue home regimen  • Behavior: much improved  OFF 1:1 observation  • Continue current medical plan of care  Appreciate IM consultants co-management  SUBJECTIVE: Patient seen face to face  No acute issues  Progressing as expected in rehabilitation program        ROS:  A ten point review of systems was completed on 03/18/23 and pertinent positives are listed in subjective section  All other systems reviewed were negative  OBJECTIVE:   BP 96/58 (BP Location: Left arm)   Pulse 93   Temp 97 5 °F (36 4 °C) (Axillary)   Resp 20   Ht 5' 3\" (1 6 m)   Wt 59 2 kg (130 lb 8 2 oz)   SpO2 97%   BMI 23 12 kg/m²     Physical Exam  Vitals and nursing note reviewed  Constitutional:       General: She is not in acute distress  HENT:      Head: Normocephalic and atraumatic  Nose: Nose normal       Mouth/Throat:      Mouth: Mucous membranes are moist    Eyes:      Conjunctiva/sclera: Conjunctivae normal    Cardiovascular:      Rate and Rhythm: Normal rate and regular rhythm  Pulses: Normal pulses     Pulmonary:      Effort: Pulmonary effort is normal       Breath sounds: Normal breath " sounds  No wheezing or rales  Abdominal:      General: Bowel sounds are normal  There is no distension  Palpations: Abdomen is soft  Tenderness: There is no abdominal tenderness  Musculoskeletal:         General: No swelling  Cervical back: Neck supple  Skin:     General: Skin is warm  Neurological:      Mental Status: She is alert and oriented to person, place, and time        Comments: dysarthria   Psychiatric:         Mood and Affect: Mood normal             Personally reviewed on 03/18/23:   Lab Results   Component Value Date    WBC 7 25 03/13/2023    HGB 12 2 03/13/2023    HCT 38 8 03/13/2023     (H) 03/13/2023     03/13/2023     Lab Results   Component Value Date    SODIUM 141 03/16/2023    K 3 8 03/16/2023     (H) 03/16/2023    CO2 28 03/16/2023    BUN 16 03/16/2023    CREATININE 0 91 03/16/2023    GLUC 104 03/16/2023    CALCIUM 9 1 03/16/2023     Lab Results   Component Value Date    INR 0 95 02/22/2023    INR 0 90 02/12/2023    INR 1 12 04/22/2022    PROTIME 12 8 02/22/2023    PROTIME 12 1 02/12/2023    PROTIME 14 0 04/22/2022           Current Facility-Administered Medications:   •  acetaminophen (TYLENOL) tablet 650 mg, 650 mg, Oral, Q6H PRN, Tryalberto Fonseca, DO, 650 mg at 03/18/23 0381  •  albuterol (PROVENTIL HFA,VENTOLIN HFA) inhaler 2 puff, 2 puff, Inhalation, Q6H PRN, Klaudia Fonseca DO, 2 puff at 03/07/23 1358  •  aluminum-magnesium hydroxide-simethicone (MYLANTA) oral suspension 30 mL, 30 mL, Oral, Q4H PRN, Tryalberto Fonseca DO, 30 mL at 03/16/23 1516  •  apixaban (ELIQUIS) tablet 5 mg, 5 mg, Oral, BID, Tryalberto Fonseca, DO, 5 mg at 03/18/23 2102  •  atorvastatin (LIPITOR) tablet 40 mg, 40 mg, Oral, QPM, Tryalberto Fonseca DO, 40 mg at 03/17/23 1914  •  bisacodyl (DULCOLAX) rectal suppository 10 mg, 10 mg, Rectal, Once, Su Stone MD  •  buPROPion [facility]) tablet 100 mg, 100 mg, Oral, BID, Klaudia Fonseca DO, 100 mg at 03/18/23 9244  •  cinacalcet (SENSIPAR) tablet 30 mg, 30 mg, Oral, Daily With Breakfast, Anika Alicia MD, 30 mg at 03/18/23 0849  •  DULoxetine (CYMBALTA) delayed release capsule 60 mg, 60 mg, Oral, BID, Marleen Christopher DO, 60 mg at 03/18/23 4080  •  gabapentin (NEURONTIN) capsule 100 mg, 100 mg, Oral, BID, Zina Jimenes MD, 100 mg at 03/18/23 9903  •  glycerin-hypromellose- (ARTIFICIAL TEARS) ophthalmic solution 1 drop, 1 drop, Both Eyes, Q4H PRN, Marleen Christopher DO, 1 drop at 03/17/23 1912  •  hydroxychloroquine (PLAQUENIL) tablet 200 mg, 200 mg, Oral, Once per day on Mon Tue Wed Thu Fri, Carloz Serna, DO, 200 mg at 03/17/23 3712  •  hydroxychloroquine (PLAQUENIL) tablet 400 mg, 400 mg, Oral, Once per day on Sun Sat, Carloz Parnell, DO, 400 mg at 03/18/23 0463  •  lamoTRIgine (LaMICtal) tablet 150 mg, 150 mg, Oral, HS, Marleen Christopher DO, 150 mg at 03/17/23 2140  •  lidocaine (LIDODERM) 5 % patch 1 patch, 1 patch, Topical, Daily, Mallory Ward MD, 1 patch at 03/18/23 3789  •  LORazepam (ATIVAN) tablet 0 25 mg, 0 25 mg, Oral, BID PRN, Zina Jimenes MD, 0 25 mg at 03/11/23 1054  •  melatonin tablet 3 mg, 3 mg, Oral, HS, Maddie Peters, MATTINP, 3 mg at 03/17/23 2140  •  menthol-methyl salicylate (BENGAY) 97-80 % cream, , Apply externally, 4x Daily PRN, Mallory Ward MD, Given at 03/06/23 1227  •  metoprolol succinate (TOPROL-XL) 24 hr tablet 25 mg, 25 mg, Oral, BID, Duran Dudley, CRNP, 25 mg at 03/17/23 1915  •  morphine (MSIR) IR tablet 7 5 mg, 7 5 mg, Oral, Q6H PRN, Zina Jimenes MD, 7 5 mg at 03/18/23 0355  •  ondansetron (ZOFRAN-ODT) dispersible tablet 4 mg, 4 mg, Oral, Q6H PRN, Marleen Christopher DO, 4 mg at 03/17/23 1446  •  pantoprazole (PROTONIX) EC tablet 40 mg, 40 mg, Oral, Early Morning, Estela Garcia MD, 40 mg at 03/18/23 0548  •  polyethylene glycol (MIRALAX) packet 17 g, 17 g, Oral, Daily PRN, Marleen Christopher DO, 17 g at 03/04/23 1930  •  prazosin (MINIPRESS) capsule 1 mg, 1 mg, Oral, , Luwana Camel ADRIENNE Parnell DO, 1 mg at 03/15/23 2110  •  senna-docusate sodium (SENOKOT S) 8 6-50 mg per tablet 1 tablet, 1 tablet, Oral, BID, Polina Allen DO, 1 tablet at 03/16/23 1704  •  sodium chloride (OCEAN) 0 65 % nasal spray 1 spray, 1 spray, Each Nare, Q1H PRN, Eloisa Alaniz MD, 1 spray at 03/08/23 0300    Past Medical History:   Diagnosis Date   • Anxiety    • Asthma    • Bipolar depression ([facility] )    • Chronic narcotic dependence (HCC)    • Chronic pain    • Depression    • Enterovirus heart infection    • SHAR (obstructive sleep apnea)    • Osteoarthritis    • Peripheral neuropathy    • Plantar fasciitis of right foot    • Senile osteoporosis    • Seronegative arthropathy of multiple sites [facility])    • Undifferentiated connective tissue disease ([place] [address] )        Patient Active Problem List    Diagnosis Date Noted   • Hypokalemia 03/10/2023   • AMS (altered mental status) 03/09/2023   • Gout of foot 03/09/2023   • Dysphagia 03/09/2023   • Wound of left ankle 03/09/2023   • UTI (urinary tract infection) 03/06/2023   • Obstructive sleep apnea 02/28/2023   • History of stroke 02/28/2023   • History of bacteremia 02/24/2023   • Acute CVA (cerebrovascular accident) ([facility] ) 02/23/2023   • Hypercalcemia 02/22/2023   • Discoloration of skin of foot 02/22/2023   • Abdominal pain 02/22/2023   • HFrEF (heart failure with reduced ejection fraction) ([facility] ) 02/13/2023   • Stroke ([place] [address] ) 02/12/2023   • HTN (hypertension) 02/12/2023   • Cardiomyopathy ([facility] ) 02/07/2023   • Vitamin D insufficiency 02/07/2023   • LAILA (acute kidney injury) ([facility] ) 04/23/2022   • Polypharmacy 04/23/2022   • 'light-for-dates' infant with signs of fetal malnutrition 04/22/2022   • Bipolar depression ([facility] ) 03/04/2022   • Rheumatoid arthritis ([facility] ) 03/04/2022   • Closed fracture of multiple ribs of right side 03/02/2022   • Traumatic pneumothorax 03/02/2022   • Closed fracture of transverse process of lumbar vertebra ([facility] ) 03/02/2022   • Liver contusion 03/02/2022   • Fall 03/02/2022   • Acute pain due to trauma 03/02/2022   • Right shoulder pain 03/02/2022   • Severe protein-calorie malnutrition ([facility] ) 03/02/2022   • Undifferentiated connective tissue disease ([facility] ) 12/29/2021   • Primary generalized (osteo)arthritis 12/29/2021   • Peripheral neuropathy 12/29/2021   • Osteoporosis 12/29/2021   • Hx of compression fracture of spine 12/29/2021   • Lumbar spondylosis 12/29/2021   • Cervical spondylosis 12/29/2021   • Anemia 06/26/2021   • Chronic pain syndrome    • Asthma    • Ambulatory dysfunction    • Seronegative arthropathy of multiple sites (HCC)    • Gastroesophageal reflux disease without esophagitis    • Mixed stress and urge urinary incontinence    • CHF (congestive heart failure) ([facility])           Verito Birch MD  PM&R      I have spent a total time of 25 minutes on 03/18/23 in caring for this patient including Patient and family education, Impressions, Documenting in the medical record and Communicating with other healthcare professionals   ** Please Note:  voice to text software may have been used in the creation of this document   Although proof errors in transcription or interpretation are a potential of such software**

## 2023-03-18 NOTE — PROGRESS NOTES
Neuro assessment remains unchanged  VS stable  C/o headache, offers no other complaints  Dr Sarah Diaz made aware of the above findings  Will continue to monitor

## 2023-03-19 RX ADMIN — MORPHINE SULFATE 7.5 MG: 15 TABLET ORAL at 07:21

## 2023-03-19 RX ADMIN — GABAPENTIN 100 MG: 100 CAPSULE ORAL at 08:38

## 2023-03-19 RX ADMIN — HYDROXYCHLOROQUINE SULFATE 400 MG: 200 TABLET ORAL at 08:39

## 2023-03-19 RX ADMIN — APIXABAN 5 MG: 5 TABLET, FILM COATED ORAL at 18:14

## 2023-03-19 RX ADMIN — CINACALCET 30 MG: 30 TABLET, FILM COATED ORAL at 07:21

## 2023-03-19 RX ADMIN — ONDANSETRON 4 MG: 4 TABLET, ORALLY DISINTEGRATING ORAL at 16:30

## 2023-03-19 RX ADMIN — GABAPENTIN 100 MG: 100 CAPSULE ORAL at 18:14

## 2023-03-19 RX ADMIN — ONDANSETRON 4 MG: 4 TABLET, ORALLY DISINTEGRATING ORAL at 10:02

## 2023-03-19 RX ADMIN — DULOXETINE HYDROCHLORIDE 60 MG: 60 CAPSULE, DELAYED RELEASE ORAL at 21:20

## 2023-03-19 RX ADMIN — PANTOPRAZOLE SODIUM 40 MG: 40 TABLET, DELAYED RELEASE ORAL at 06:15

## 2023-03-19 RX ADMIN — APIXABAN 5 MG: 5 TABLET, FILM COATED ORAL at 08:38

## 2023-03-19 RX ADMIN — LIDOCAINE 5% 1 PATCH: 700 PATCH TOPICAL at 08:38

## 2023-03-19 RX ADMIN — BUPROPION HYDROCHLORIDE TABLETS 100 MG: 100 TABLET, FILM COATED ORAL at 08:39

## 2023-03-19 RX ADMIN — MELATONIN 3 MG: at 21:20

## 2023-03-19 RX ADMIN — Medication 1 APPLICATION.: at 21:34

## 2023-03-19 RX ADMIN — BUPROPION HYDROCHLORIDE TABLETS 100 MG: 100 TABLET, FILM COATED ORAL at 18:15

## 2023-03-19 RX ADMIN — ATORVASTATIN CALCIUM 40 MG: 40 TABLET, FILM COATED ORAL at 18:14

## 2023-03-19 RX ADMIN — METOPROLOL SUCCINATE 25 MG: 25 TABLET, EXTENDED RELEASE ORAL at 18:14

## 2023-03-19 RX ADMIN — METOPROLOL SUCCINATE 25 MG: 25 TABLET, EXTENDED RELEASE ORAL at 08:38

## 2023-03-19 RX ADMIN — GLYCERIN 1 DROP: .002; .002; .01 SOLUTION/ DROPS OPHTHALMIC at 19:44

## 2023-03-19 RX ADMIN — SENNOSIDES AND DOCUSATE SODIUM 1 TABLET: 8.6; 5 TABLET ORAL at 18:14

## 2023-03-19 RX ADMIN — MORPHINE SULFATE 7.5 MG: 15 TABLET ORAL at 14:47

## 2023-03-19 RX ADMIN — LAMOTRIGINE 150 MG: 100 TABLET ORAL at 21:20

## 2023-03-19 RX ADMIN — Medication 1 APPLICATION.: at 16:31

## 2023-03-19 RX ADMIN — DULOXETINE HYDROCHLORIDE 60 MG: 60 CAPSULE, DELAYED RELEASE ORAL at 08:38

## 2023-03-19 RX ADMIN — SENNOSIDES AND DOCUSATE SODIUM 1 TABLET: 8.6; 5 TABLET ORAL at 08:38

## 2023-03-19 RX ADMIN — Medication 1 APPLICATION.: at 07:30

## 2023-03-19 RX ADMIN — LORAZEPAM 0.25 MG: 0.5 TABLET ORAL at 21:23

## 2023-03-19 NOTE — PLAN OF CARE
Problem: PAIN - ADULT  Goal: Verbalizes/displays adequate comfort level or baseline comfort level  Description: Interventions:  - Encourage patient to monitor pain and request assistance  - Assess pain using appropriate pain scale  - Administer analgesics based on type and severity of pain and evaluate response  - Implement non-pharmacological measures as appropriate and evaluate response  - Consider cultural and social influences on pain and pain management  - Notify physician/advanced practitioner if interventions unsuccessful or patient reports new pain  Outcome: Progressing     Problem: INFECTION - ADULT  Goal: Absence or prevention of progression during hospitalization  Description: INTERVENTIONS:  - Assess and monitor for signs and symptoms of infection  - Monitor lab/diagnostic results  - Monitor all insertion sites, i e  indwelling lines, tubes, and drains  - Monitor endotracheal if appropriate and nasal secretions for changes in amount and color  - [place] appropriate cooling/warming therapies per order  - Administer medications as ordered  - Instruct and encourage patient and family to use good hand hygiene technique  - Identify and instruct in appropriate isolation precautions for identified infection/condition  Outcome: Progressing  Goal: Absence of fever/infection during neutropenic period  Description: INTERVENTIONS:  - Monitor WBC    Outcome: Progressing     Problem: SAFETY ADULT  Goal: Patient will remain free of falls  Description: INTERVENTIONS:  - Educate patient/family on patient safety including physical limitations  - Instruct patient to call for assistance with activity   - Consult OT/PT to assist with strengthening/mobility   - Keep Call bell within reach  - Keep bed low and locked with side rails adjusted as appropriate  - Keep care items and personal belongings within reach  - Initiate and maintain comfort rounds  - Make Fall Risk Sign visible to staff  - Offer Toileting every  Hours, in advance of need  - Initiate/Maintain alarm  - Obtain necessary fall risk management equipment:   - Apply yellow socks and bracelet for high fall risk patients  - Consider moving patient to room near nurses station  Outcome: Progressing  Goal: Maintain or return to baseline ADL function  Description: INTERVENTIONS:  -  Assess patient's ability to carry out ADLs; assess patient's baseline for ADL function and identify physical deficits which impact ability to perform ADLs (bathing, care of mouth/teeth, toileting, grooming, dressing, etc )  - Assess/evaluate cause of self-care deficits   - Assess range of motion  - Assess patient's mobility; develop plan if impaired  - Assess patient's need for assistive devices and provide as appropriate  - Encourage maximum independence but intervene and supervise when necessary  - Involve family in performance of ADLs  - Assess for home care needs following discharge   - Consider OT consult to assist with ADL evaluation and planning for discharge  - Provide patient education as appropriate  Outcome: Progressing  Goal: Maintains/Returns to pre admission functional level  Description: INTERVENTIONS:  - Perform BMAT or MOVE assessment daily    - Set and communicate daily mobility goal to care team and patient/family/caregiver  - Collaborate with rehabilitation services on mobility goals if consulted  - Perform Range of Motion times a day  - Reposition patient every  hours    - Dangle patient times a day  - Stand patient  times a day  - Ambulate patient  times a day  - Out of bed to chair  times a day   - Out of bed for meals times a day  - Out of bed for toileting  - Record patient progress and toleration of activity level   Outcome: Progressing     Problem: DISCHARGE PLANNING  Goal: Discharge to home or other facility with appropriate resources  Description: INTERVENTIONS:  - Identify barriers to discharge w/patient and caregiver  - Arrange for needed discharge resources and transportation as appropriate  - Identify discharge learning needs (meds, wound care, etc )  - Arrange for interpretive services to assist at discharge as needed  - Refer to Case Management Department for coordinating discharge planning if the patient needs post-hospital services based on physician/advanced practitioner order or complex needs related to functional status, cognitive ability, or social support system  Outcome: Progressing     Problem: Nutrition/Hydration-ADULT  Goal: Nutrient/Hydration intake appropriate for improving, restoring or maintaining nutritional needs  Description: Monitor and assess patient's nutrition/hydration status for malnutrition  Collaborate with interdisciplinary team and initiate plan and interventions as ordered  Monitor patient's weight and dietary intake as ordered or per policy  Utilize nutrition screening tool and intervene as necessary  Determine patient's food preferences and provide high-protein, high-caloric foods as appropriate       INTERVENTIONS:  - Monitor oral intake, urinary output, labs, and treatment plans  - Assess nutrition and hydration status and recommend course of action  - Evaluate amount of meals eaten  - Assist patient with eating if necessary   - Allow adequate time for meals  - Recommend/ encourage appropriate diets, oral nutritional supplements, and vitamin/mineral supplements  - Order, calculate, and assess calorie counts as needed  - Recommend, monitor, and adjust tube feedings and TPN/PPN based on assessed needs  - Assess need for intravenous fluids  - Provide specific nutrition/hydration education as appropriate  - Include patient/family/caregiver in decisions related to nutrition  Outcome: Progressing     Problem: Prexisting or High Potential for Compromised Skin Integrity  Goal: Skin integrity is maintained or improved  Description: INTERVENTIONS:  - Identify patients at risk for skin breakdown  - Assess and monitor skin integrity  - Assess and monitor nutrition and hydration status  - Monitor labs   - Assess for incontinence   - Turn and reposition patient  - Assist with mobility/ambulation  - Relieve pressure over bony prominences  - Avoid friction and shearing  - Provide appropriate hygiene as needed including keeping skin clean and dry  - Evaluate need for skin moisturizer/barrier cream  - Collaborate with interdisciplinary team   - Patient/family teaching  - Consider wound care consult   Outcome: Progressing     Problem: MOBILITY - ADULT  Goal: Maintain or return to baseline ADL function  Description: INTERVENTIONS:  -  Assess patient's ability to carry out ADLs; assess patient's baseline for ADL function and identify physical deficits which impact ability to perform ADLs (bathing, care of mouth/teeth, toileting, grooming, dressing, etc )  - Assess/evaluate cause of self-care deficits   - Assess range of motion  - Assess patient's mobility; develop plan if impaired  - Assess patient's need for assistive devices and provide as appropriate  - Encourage maximum independence but intervene and supervise when necessary  - Involve family in performance of ADLs  - Assess for home care needs following discharge   - Consider OT consult to assist with ADL evaluation and planning for discharge  - Provide patient education as appropriate  Outcome: Progressing  Goal: Maintains/Returns to pre admission functional level  Description: INTERVENTIONS:  - Perform BMAT or MOVE assessment daily    - Set and communicate daily mobility goal to care team and patient/family/caregiver  - Collaborate with rehabilitation services on mobility goals if consulted  - Perform Range of Motion  times a day  - Reposition patient every  hours    - Dangle patient  times a day  - Stand patient  times a day  - Ambulate patient times a day  - Out of bed to chair times a day   - Out of bed for meals times a day  - Out of bed for toileting  - Record patient progress and toleration of activity level   Outcome: Progressing

## 2023-03-19 NOTE — PROGRESS NOTES
03/19/23 6696   Pain Assessment   Pain Assessment Tool 0-10   Pain Score 6   Wheeler-Baker FACES Pain Rating 2   Pain Location/Orientation Orientation: Right;Location: Abdomen   Pain Radiating Towards no   Pain Onset/Description Onset: Ongoing   Effect of Pain on Daily Activities tolerated   Patient's Stated Pain Goal No pain   Hospital Pain Intervention(s)   (RN notified, also gave pt zofran during tx with reported nausea onset)   Multiple Pain Sites Yes   Pain 2   Pain Score 2 6   Pain Location/Orientation 2 Location: Back  (during gait training without AD)   Pain Radiating Towards 2 no   Patient's Stated Pain Goal 2 No pain   Hospital Pain Intervention(s) 2 Rest   Restrictions/Precautions   Precautions Aspiration; Fall Risk;Supervision on toilet/commode;Pain;Bed/chair alarms;Cognitive  (L side inattention, NTL), contact precautions   Cognition   Overall Cognitive Status Impaired   Arousal/Participation Alert; Cooperative   Attention Attends with cues to redirect   Orientation Level Oriented X4   Subjective   Subjective pt reported pain above but agreeable to PT   Roll Left and Right   Type of Assistance Needed Supervision   Comment no rail, HOB flat   Roll Left and Right CARE Score 4   Sit to Lying   Type of Assistance Needed Supervision;Verbal cues   Comment HOB flat no rail, requested to return to bed at end of tx , RN notified and pt placed on 2L supplemental O2 via NC as pt plans to sleep   Sit to Lying CARE Score 4   Lying to Sitting on Side of Bed   Comment in recliner at start of tx   Sit to Stand   Type of Assistance Needed Physical assistance;Verbal cues; Adaptive equipment   Physical Assistance Level 25% or less   Comment cont to requires VC for L hand placement due to weakness, inattention  and impaired proprioception   Sit to Stand CARE Score 3   Bed-Chair Transfer   Type of Assistance Needed Physical assistance;Verbal cues; Adaptive equipment   Physical Assistance Level 25% or less   Comment min-CG with RW, again VC/TC to keep L hand  on walker handle   Chair/Bed-to-Chair Transfer CARE Score 3   Walk 10 Feet   Type of Assistance Needed Physical assistance;Verbal cues; Adaptive equipment   Physical Assistance Level 25% or less   Comment CGA most of the time with occasional min for obstacle /furniture clearance on L side due to inattention using a RW x 70', 20'  consistent min when amb without AD x 40', 50'   Walk 10 Feet CARE Score 3   Walk 50 Feet with Two Turns   Type of Assistance Needed Physical assistance;Verbal cues; Adaptive equipment   Physical Assistance Level 25% or less   Walk 50 Feet with Two Turns CARE Score 3   Wheel 50 Feet with Two Turns   Type of Assistance Needed Supervision;Verbal cues   Wheel 50 Feet with Two Turns CARE Score 4   Wheel 150 Feet   Type of Assistance Needed Supervision;Verbal cues   Comment 75% VC to attend to L side environment to avoid grazing L body parts or w/c on furnitures on the side of the hallway   Wheel 150 Feet CARE Score 4   Curb or Single Stair   Style negotiated Single stair   Type of Assistance Needed Physical assistance;Verbal cues; Adaptive equipment   Physical Assistance Level 26%-50%   Comment min A to mod of 1 x 2 steps with bilat HR when descending fwd non reciprocal pattern due to difficulty clearing L LE particularly the heel part off the steps nosing inc risk for falls  ascent fwd/descent bwd was better requiring min-CG although required VC for L hand placement on rail and  safer L foot placement on step to reduce risk of post LOB x 4 steps with tolerance limited by fatigue and weakness   1 Step (Curb) CARE Score 3   4 Steps   Type of Assistance Needed Physical assistance;Verbal cues; Adaptive equipment   Physical Assistance Level 26%-50%   4 Steps CARE Score 3   Toilet Transfer   Comment repeatedly declined   Assessment   Treatment Assessment pt is demonstrating improving indep with w/c propulsion however still presents with inc risk for falls and inc caregiver burden with overall functional mobilities when using a RW due to L hemiparesis with impaired proprioception, dec dynamic balance and righting reactions, dec act tolerance and L side inattention and dec cognition  Although pt is also able to mobilize household distances without AD but tolerance limited by back pain and safety limited by aforementioned above deficits  Pt will benefit from additional skilled PT intervention to facilitate motor and functional gains s/p CVA  Family/Caregiver Present no   PT Barriers   Functional Limitation Car transfers; Ramp negotiation;Stair negotiation;Standing;Transfers; Walking; Wheelchair management   Plan   Treatment/Interventions Functional transfer training; Therapeutic exercise; Endurance training;Gait training;Bed mobility;Cognitive reorientation;Equipment eval/education;OT;LE strengthening/ROM   Progress Progressing toward goals   Recommendation   PT Discharge Recommendation Post acute rehabilitation services   Equipment Recommended Tanna Grate; Wheelchair   PT Therapy Minutes   PT Time In 0930   PT Time Out 1030   PT Total Time (minutes) 60   PT Mode of treatment - Individual (minutes) 45   PT Mode of treatment - Concurrent (minutes) 15   PT Mode of treatment - Group (minutes) 0   PT Mode of treatment - Co-treat (minutes) 0   PT Mode of Treatment - Total time(minutes) 60 minutes   PT Cumulative Minutes 1308   Therapy Time missed   Time missed?  No

## 2023-03-19 NOTE — PROGRESS NOTES
Internal Medicine Progress Note  Patient: Dolly Crane  Age/sex: 75 y o  female  Medical Record #: [id number]      ASSESSMENT/PLAN: (Interval History)  Dolly Crane is seen and examined and management for following issues:    Recent right MCA infarct W/petechial hemorrhage  • S/P thrombectomy by Dr Elaina Kawasaki 2/12  • Continue Eliquis 5 mg twice daily/statin   • Follow-up with neurosurgery/neurology as scheduled     NICM  • EF has been as low as 10 to 15% in the past  • In 2022 was 65%  • Now down to 30% = pt declined LifeVest  • Cardiology felt this was secondary to her recent CVA as well as possible medication noncompliance  • They recommend continuing beta-blocker and adding ARB when BP allows  • Will need outpatient ischemia work-up  • Followed by Dr Jimi [facility]auhan at [facility] however would like to follow with [facility] on dc  • Can add arb if bp improves     Seronegative rheumatoid arthritis  • A/T rheumatology takes Plaquenil 200 mg Monday through Friday 400 mg on Saturday and Sunday  • Continue same  • Follow-up on discharge     Hypercalcemia  · Seen by renal/endocrine on acute side  · Started on Sensipar and now down to 9 5  · Endocrine input appreciated  Will need outpt follow-up and potentially a surgical evaluation  Fall  · 3/6/23 onto left side  · Abrasion on left knee and Xray negative for fracture  · Could not verify whether or not she struck her head  · CT of head stable  · She also c/o L hip pain  X-ray negative for fx  · Fall precautions all in place       R great toe gout  · S/p prednisone  · Uric acid normal  · improved     Chronic pain  • Due to compression fractures of her thoracic spine/chronic rib fractures  • Lethargic 3/7 and MS contin changed to IR  • Generally takes about 1-2 doses daily  • Pain management per PMR     Bipolar disorder  • Continue current medication regimen  • Follow-up with psychiatry on discharge  • Neuropsych following    Anxiety  · Cont current meds  · Moods are stable    Confusion  · Improved  · Off continuous observation    SHAR  • Does not use CPAP at home d/t claustraphobia  • OK use oxygen overnight     Dysuria/Urinary frequency/incontinence  • Urine culture +CRE  • s/p vantin 200mg bid through 3/9/23  • resolved    Hypokalemia  · Improved after replacement  · Repleted kcl 40 meq daily x 2  · K 3 8  · BMP stable  · Labs monday DC planning: 3/22/23=likely SNF       The above assessment and plan was reviewed and updated as determined by my evaluation of the patient on 3/19/2023      Labs:   Results from last 7 days   Lab Units 03/13/23  0612   WBC Thousand/uL 7 25   HEMOGLOBIN g/dL 12 2   HEMATOCRIT % 38 8   PLATELETS Thousands/uL 172     Results from last 7 days   Lab Units 03/16/23  0705 03/13/23  0612   SODIUM mmol/L 141 138   POTASSIUM mmol/L 3 8 3 8   CHLORIDE mmol/L 112* 113*   CO2 mmol/L 28 22   BUN mg/dL 16 13   CREATININE mg/dL 0 91 0 87   CALCIUM mg/dL 9 1 9 5                   Review of Scheduled Meds:  Current Facility-Administered Medications   Medication Dose Route Frequency Provider Last Rate   • acetaminophen  650 mg Oral Q6H PRN Celestia Fleming, DO     • albuterol  2 puff Inhalation Q6H PRN Celestia Fleming, DO     • aluminum-magnesium hydroxide-simethicone  30 mL Oral Q4H PRN Celestia Fleming, DO     • apixaban  5 mg Oral BID Celestia Fleming, DO     • atorvastatin  40 mg Oral QPM Celestia Fleming, DO     • bisacodyl  10 mg Rectal Once Vicky Bright MD     • buPROPion  100 mg Oral BID Celestia Fleming, DO     • cinacalcet  30 mg Oral Daily With Breakfast Anika Arteaga MD     • DULoxetine  60 mg Oral BID Celestia Fleming, DO     • gabapentin  100 mg Oral BID Vicky Bright MD     • glycerin-hypromellose-  1 drop Both Eyes Q4H PRN Celestia Fleming, DO     • hydroxychloroquine  200 mg Oral Once per day on Mon Tue Wed Thu Fri Celestia Fleming, DO     • hydroxychloroquine  400 mg Oral Once per day on Sun Sat Celestia Fleming, DO     • lamoTRIgine  150 mg Oral HS Illona Erp, DO     • lidocaine  1 patch Topical Daily Marcia Tan MD     • LORazepam  0 25 mg Oral BID PRN Toi Lemon MD     • melatonin  3 mg Oral HS LAURA Acevedo     • menthol-methyl salicylate   Apply externally 4x Daily PRN Marcia Tan MD     • metoprolol succinate  25 mg Oral BID LAURA Howard     • morphine  7 5 mg Oral Q6H PRN Toi Lemon MD     • ondansetron  4 mg Oral Q6H PRN Illona Erp, DO     • pantoprazole  40 mg Oral Early Morning Margarita Ray MD     • polyethylene glycol  17 g Oral Daily PRN Illona Erp, DO     • prazosin  1 mg Oral HS Illona Erp, DO     • senna-docusate sodium  1 tablet Oral BID Illona Erp, DO     • sodium chloride  1 spray Each Nare Q1H PRN Francois Hobson MD     • white petrolatum-mineral oil   Topical TID Francois Hobson MD         Subjective/ HPI: Patient seen and examined  Patients overnight issues or events were reviewed with nursing or staff during rounds or morning huddle session  New or overnight issues include the following:     Pt seen in her room  She had some nausea after therapy today which improved with Zofran  She denies any other complaints  ROS:   A 10 point ROS was performed; negative except as noted above  Imaging:     FL barium swallow video w speech   Final Result by SYSTEMGENERATED, DOCUMENTATION ( 130)      FL barium swallow video w speech   Final Result by  ( 1550)      XR hip/pelv 2-3 vws left if performed   Final Result by Brittni Isbell MD ([id number])      No acute osseous abnormality  Workstation performed: [id number]         XR shoulder 2+ vw left   Final Result by Brittni Isbell MD (1)      No acute osseous abnormality        Workstation performed: [id number]         XR spine cervical 2 or 3 vw injury   Final Result by Brittni Isbell MD (1643)      No acute osseous abnormality or significant change from priors  Salt-and-pepper appearance of the skull is partially accentuated by the image processing /sharpening algorithm, though prior head CT suggests this is a true finding  Correlate for clinical/laboratory features of hyperparathyroidism  Workstation performed: [id number]         XR chest portable   Final Result by Zita Chaves MD (03/07 1650)      No acute cardiopulmonary disease  Workstation performed: [id number]         CT head wo contrast   Final Result by Bradley Sosa MD (03/07 3691)      Evolving right MCA territory infarcts with associated petechial hemorrhage which is best seen on the recent MRI examination  There is no discrete parenchymal hematoma  Workstation performed: [id number]         XR knee 1 or 2 vw left   Final Result by Zita Chaves MD (03/07 7803)      No acute osseous abnormality              Workstation performed: [id number]             *Labs /Radiology studies reviewed  *Medications reviewed and reconciled as needed  *Please refer to order section for additional ordered labs studies  *Case discussed with primary attending during morning huddle case rounds    Physical Examination:  Vitals:   Vitals:    03/18/23 1020 03/18/23 1500 03/18/23 2015 03/19/23 0546   BP: 96/58 100/66 99/59 110/63   BP Location: Left arm Right arm Right arm Right arm   Pulse: 93 89 88 83   Resp:  20 16 20   Temp:  97 5 °F (36 4 °C) (!) 97 4 °F (36 3 °C) (!) 97 4 °F (36 3 °C)   TempSrc:  Oral Axillary Axillary   SpO2:  94% 99% 97%   Weight:       Height:           GEN: No apparent distress, interactive  NEURO: Alert and oriented x3; left neglect, mild dysarthria  HEENT: Pupils are equal and reactive, EOMI, mucous membranes are moist, face asymmetrical  CV: S1 S2 regular, no MRG, no peripheral edema noted  RESP: Lungs are decreased, no wheezes, rales or rhonchi noted, on room air requires oxygen with sleep, respirations easy and non labored  GI: Flat, soft non tender, non distended; +BS x4  : Voiding without difficulty, incontinent at times  MUSC: Moves all extremities; stable Lt hemiparesis  SKIN: pink, dry skin, poor turgor, b/l LE with dressings in place for both prevention/current wounds         The above physical exam was reviewed and updated as determined by my evaluation of the patient on 3/19/2023  Invasive Devices     None                    VTE Pharmacologic Prophylaxis: Eliquis  Code Status: Level 1 - Full Code  Current Length of Stay: 19 day(s)      Total time spent:  30 minutes  with more than 50% spent counseling/coordinating care  Counseling includes discussion with patient re: progress  and discussion with patient of his/her current medical state/information  Coordination of patient's care was performed in conjunction with primary service  Time invested included review of patient's labs, vitals, and management of their comorbidities with continued monitoring  In addition, this patient was discussed with medical team including physician and advanced extenders  The care of the patient was extensively discussed and appropriate treatment plan was formulated unique for this patient  Medical decision making for the day was made by supervising physician unless otherwise noted in their attestation statement  ** Please Note:  voice to text software may have been used in the creation of this document   Although proof errors in transcription or interpretation are a potential of such software**

## 2023-03-19 NOTE — PROGRESS NOTES
"OT Treatment Note        03/19/23 0700   Pain Assessment   Pain Assessment Tool 0-10   Pain Score 7   Pain Location/Orientation Location: Back;Orientation: Lower   Hospital Pain Intervention(s) Other (Comment)  (RN notified)   Restrictions/Precautions   Precautions Aphasia; Aspiration;Bed/chair alarms;Cognitive; Fall Risk;Contact/isolation;Supervision on toilet/commode   Lifestyle   Autonomy \"I'm exahusted today\"   Eating   Type of Assistance Needed Supervision   Physical Assistance Level No physical assistance   Eating CARE Score 4   Oral Hygiene   Comment nursing completed using suctiong this morning   Shower/Bathe Self   Type of Assistance Needed Physical assistance   Physical Assistance Level 25% or less   Comment Seated on EOB, pt able to bathe UB, LEs with crossed leg technique  In stance at [facility] with Min A to bathe groin and  Partially bathe buttocks  OT A with buttocks for thoroughness  Mod vc for thoroughness throughout  Shower/Bathe Self CARE Score 3   Bathing   Assessed Bath Style Sponge Bath   Upper Body Dressing   Type of Assistance Needed Physical assistance   Physical Assistance Level 26%-50%   Comment A to doff shirt overhead  Pt able to don bra  VC to take off LUE fully  OT A with threading LUE pt then able to thread RUE and overhead with increased time  Min vc to increase awareness to LUE to increase functional use  Upper Body Dressing CARE Score 3   Lower Body Dressing   Type of Assistance Needed Physical assistance   Physical Assistance Level 26%-50%   Comment Pt able to thread BLE into pants and socks with increased time by reaching down  Mod vc to orient pants in correct direction  In stance at [facility] with Min A pt able to partially manage clothing over hips on R side  OT A with L  Mod vc to utilize LUE to manage pants     Lower Body Dressing CARE Score 3   Putting On/Taking Off Footwear   Type of Assistance Needed Physical assistance   Physical Assistance Level 51%-75%   Comment Pt able to doff b/l " socks and don L sock  OT A by threading R sock, pt donned rest of way  Anticipate need to don/doff shoes  Putting On/Taking Off Footwear CARE Score 2   Sit to Stand   Type of Assistance Needed Physical assistance   Physical Assistance Level 25% or less   Comment with RW, Min vc to grasp fully with LUE   Sit to Stand CARE Score 3   Bed-Chair Transfer   Type of Assistance Needed Physical assistance   Physical Assistance Level 25% or less   Comment with RW to/from bathroom  Chair/Bed-to-Chair Transfer CARE Score 3   Toileting Hygiene   Type of Assistance Needed Physical assistance   Physical Assistance Level 26%-50%   Comment At [facility] over toilet, Min A in stance during front hygiene  Pt able to manage clothing down  Pt able to mange clothing up on R, OT A on L  Min vc to grasp RW with LUE  Toileting Hygiene CARE Score 3   Toilet Transfer   Type of Assistance Needed Physical assistance   Physical Assistance Level 25% or less   Comment to [facility] [place] toilet, use of R grab bar during sit to stand   Toilet Transfer CARE Score 3   Neuromuscular Education   Comments To compensate for L innattention and bring awareness to L environment in room, OT placed post-it notes on L side in room for visual anchor  OT educated pt on use for L inattention, pt able to teach back  Pt demonstrated increased awareness to L side following visual intervention and verbalized that they increase her attention to those areas  Cognition   Overall Cognitive Status Impaired   Arousal/Participation Alert; Cooperative   Attention Attends with cues to redirect   Orientation Level Oriented X4   Memory Decreased short term memory   Following Commands Follows one step commands with increased time or repetition   Activity Tolerance   Activity Tolerance Patient tolerated treatment well   Assessment   Treatment Assessment Pt engaged in skilled OT tx session focused on ADLs  See above for further details on functional performance   Pt stated she was exhausted this morning and required increased time to complete ADL routine  OT educated pt on increasing awareness to LUE during ADL by looking at LUE  Pt verbalized understanding but continued to focus on R side only, requiring mod VC to attend to L side  Pt also stated that postit note visual anchors on L side in room was beneficial, so cont to utilize in room to increase awareness  Cont OT POC with foucs on: L attention training, RW management/safety, cognitveretraining related to short term memory and attention to task, standing balance/tolerance, and safety during functional transfer training  Pt left in recliner with all needs in reach and alarm activated  Overall, pt has demonstrated progress during acute rehab stay, on eval pt required max/total assist for ADL and transfers, now min/mod assist for ADLs and min assist for transfers using RW  Family meeting/training has occurred, pt's family are preparing 1st floor setup at home and are aware of recommendation for 24/7 supervision/assist and coordinating plans for this  Continue to recommend subacute rehab services to increase pt's independence and functional improvement  Prognosis Fair   Problem List Decreased strength;Decreased range of motion;Decreased endurance; Impaired balance;Decreased mobility; Decreased coordination;Decreased cognition;Decreased safety awareness; Impaired sensation   Barriers to Discharge Decreased caregiver support; Inaccessible home environment   Plan   Treatment/Interventions ADL retraining;Functional transfer training; Endurance training; Therapeutic exercise;Cognitive reorientation;Patient/family training;Equipment eval/education; Compensatory technique education   Progress Progressing toward goals   Recommendation   OT Discharge Recommendation Post acute rehabilitation services   OT Therapy Minutes   OT Time In 0700   OT Time Out 0830   OT Total Time (minutes) 90   OT Mode of treatment - Individual (minutes) 90   OT Mode of treatment - Concurrent (minutes) 0   OT Mode of treatment - Group (minutes) 0   OT Mode of treatment - Co-treat (minutes) 0   OT Mode of Treatment - Total time(minutes) 90 minutes   OT Cumulative Minutes 1560   Therapy Time missed   Time missed?  No

## 2023-03-20 LAB
ANION GAP SERPL CALCULATED.3IONS-SCNC: 4 MMOL/L (ref 4–13)
BASOPHILS # BLD AUTO: 0.02 THOUSANDS/ÂΜL (ref 0–0.1)
BASOPHILS NFR BLD AUTO: 0 % (ref 0–1)
BUN SERPL-MCNC: 13 MG/DL (ref 5–25)
CALCIUM SERPL-MCNC: 8.8 MG/DL (ref 8.3–10.1)
CHLORIDE SERPL-SCNC: 111 MMOL/L (ref 96–108)
CO2 SERPL-SCNC: 28 MMOL/L (ref 21–32)
CREAT SERPL-MCNC: 0.82 MG/DL (ref 0.6–1.3)
EOSINOPHIL # BLD AUTO: 0.05 THOUSAND/ÂΜL (ref 0–0.61)
EOSINOPHIL NFR BLD AUTO: 1 % (ref 0–6)
ERYTHROCYTE [DISTWIDTH] IN BLOOD BY AUTOMATED COUNT: 14.6 % (ref 11.6–15.1)
GFR SERPL CREATININE-BSD FRML MDRD: 73 ML/MIN/1.73SQ M
GLUCOSE SERPL-MCNC: 88 MG/DL (ref 65–140)
HCT VFR BLD AUTO: 39 % (ref 34.8–46.1)
HGB BLD-MCNC: 12.4 G/DL (ref 11.5–15.4)
IMM GRANULOCYTES # BLD AUTO: 0.01 THOUSAND/UL (ref 0–0.2)
IMM GRANULOCYTES NFR BLD AUTO: 0 % (ref 0–2)
LYMPHOCYTES # BLD AUTO: 1.33 THOUSANDS/ÂΜL (ref 0.6–4.47)
LYMPHOCYTES NFR BLD AUTO: 18 % (ref 14–44)
MCH RBC QN AUTO: 31.5 PG (ref 26.8–34.3)
MCHC RBC AUTO-ENTMCNC: 31.8 G/DL (ref 31.4–37.4)
MCV RBC AUTO: 99 FL (ref 82–98)
MONOCYTES # BLD AUTO: 0.53 THOUSAND/ÂΜL (ref 0.17–1.22)
MONOCYTES NFR BLD AUTO: 7 % (ref 4–12)
NEUTROPHILS # BLD AUTO: 5.49 THOUSANDS/ÂΜL (ref 1.85–7.62)
NEUTS SEG NFR BLD AUTO: 74 % (ref 43–75)
NRBC BLD AUTO-RTO: 0 /100 WBCS
PLATELET # BLD AUTO: 213 THOUSANDS/UL (ref 149–390)
PMV BLD AUTO: 10.6 FL (ref 8.9–12.7)
POTASSIUM SERPL-SCNC: 3.7 MMOL/L (ref 3.5–5.3)
RBC # BLD AUTO: 3.94 MILLION/UL (ref 3.81–5.12)
SODIUM SERPL-SCNC: 143 MMOL/L (ref 135–147)
WBC # BLD AUTO: 7.43 THOUSAND/UL (ref 4.31–10.16)

## 2023-03-20 RX ADMIN — BUPROPION HYDROCHLORIDE TABLETS 100 MG: 100 TABLET, FILM COATED ORAL at 08:01

## 2023-03-20 RX ADMIN — DULOXETINE HYDROCHLORIDE 60 MG: 60 CAPSULE, DELAYED RELEASE ORAL at 08:00

## 2023-03-20 RX ADMIN — GABAPENTIN 100 MG: 100 CAPSULE ORAL at 08:00

## 2023-03-20 RX ADMIN — APIXABAN 5 MG: 5 TABLET, FILM COATED ORAL at 18:32

## 2023-03-20 RX ADMIN — Medication: at 22:05

## 2023-03-20 RX ADMIN — HYDROXYCHLOROQUINE SULFATE 200 MG: 200 TABLET ORAL at 08:03

## 2023-03-20 RX ADMIN — MELATONIN 3 MG: at 22:05

## 2023-03-20 RX ADMIN — LIDOCAINE 5% 1 PATCH: 700 PATCH TOPICAL at 08:03

## 2023-03-20 RX ADMIN — BUPROPION HYDROCHLORIDE TABLETS 100 MG: 100 TABLET, FILM COATED ORAL at 18:33

## 2023-03-20 RX ADMIN — DULOXETINE HYDROCHLORIDE 60 MG: 60 CAPSULE, DELAYED RELEASE ORAL at 22:05

## 2023-03-20 RX ADMIN — GABAPENTIN 100 MG: 100 CAPSULE ORAL at 18:32

## 2023-03-20 RX ADMIN — LAMOTRIGINE 150 MG: 100 TABLET ORAL at 22:05

## 2023-03-20 RX ADMIN — ATORVASTATIN CALCIUM 40 MG: 40 TABLET, FILM COATED ORAL at 18:32

## 2023-03-20 RX ADMIN — PANTOPRAZOLE SODIUM 40 MG: 40 TABLET, DELAYED RELEASE ORAL at 05:36

## 2023-03-20 RX ADMIN — METOPROLOL SUCCINATE 25 MG: 25 TABLET, EXTENDED RELEASE ORAL at 08:02

## 2023-03-20 RX ADMIN — MORPHINE SULFATE 7.5 MG: 15 TABLET ORAL at 10:15

## 2023-03-20 RX ADMIN — CINACALCET 30 MG: 30 TABLET, FILM COATED ORAL at 08:01

## 2023-03-20 RX ADMIN — Medication: at 18:33

## 2023-03-20 RX ADMIN — GLYCERIN 1 DROP: .002; .002; .01 SOLUTION/ DROPS OPHTHALMIC at 18:32

## 2023-03-20 RX ADMIN — ALUMINUM HYDROXIDE, MAGNESIUM HYDROXIDE, AND DIMETHICONE 30 ML: 200; 20; 200 SUSPENSION ORAL at 16:43

## 2023-03-20 RX ADMIN — APIXABAN 5 MG: 5 TABLET, FILM COATED ORAL at 08:01

## 2023-03-20 RX ADMIN — ONDANSETRON 4 MG: 4 TABLET, ORALLY DISINTEGRATING ORAL at 08:47

## 2023-03-20 RX ADMIN — Medication: at 08:03

## 2023-03-20 NOTE — PROGRESS NOTES
03/20/23 0801   Pain Assessment   Pain Assessment Tool 0-10   Pain Score No Pain   Restrictions/Precautions   Precautions Aspiration;Bed/chair alarms;Cognitive; Fall Risk;Supervision on toilet/commode;Visual deficit   Weight Bearing Restrictions No   ROM Restrictions No   Eating   Type of Assistance Needed Set-up / clean-up;Supervision;Verbal cues   Physical Assistance Level No physical assistance   Eating CARE Score 4   Swallow Assessment   Swallow Treatment Assessment Daily Dysphagia Tx Note      Patient Name: Alfredo Henderson     ABVJZ'E Date: 3/20/2023        Current Risks for Dysphagia & Aspiration: Weak voicing; weak cough; dysarthria; general debilitation; new neuro event; brain injury; cognitive deficit; hx neurologic dx; positioning issues     Current Symptoms/Concerns: Cough; throat clear; with liquids; during meals; with pills; difficulty chewing; decreased oral intake; hx of dysphagia/aspiration     Current diet: puree/level 1 diet and nectar thick liquids      Premorbid diet::upon d/c from prior admission, pt appeared to be on dysphagia level 2/thin liquids upon prior admission for stroke initially; unknown prior to admission, pt rx being on softer foods as well as thickened liquids in the past        Positioning: upright in recliner          Items administered:Consistencies Administered: nectar thick, puree and ice chips  Materials administered included pureed eggs, pureed Khmer toast, NTL by cup and ice chips      Total amount of meal consumed:   2 bites each of puree items  240cc NTL by cup  5 ice chips      Oral stage:mild  Lip closure: functional retrieval to utensil and cup sips   Anterior spillage: none  Mastication: slow manipulation of puree and ice chips  Bolus formation: decreased with puree and ice chips  Bolus control: suspect decreased posterior control with all consistencies  Transfer: decreased- lingual pumping present   Oral residue: none  Pocketing: none         Pharyngeal "stage:mild- moderate   Swallow promptness: delayed with purees and liquids  Hyolaryngeal elevation: suspect decreased   Wet voice: none   Throat clear: when prompted for protective means  Cough: x 1 with weak/uncoordinated swallow ice chip  Secondary swallows: encouraged double and effortful swallows with liquids and ice chips  Audible swallows: present with all swallow- more prominent with liquids  Esophageal stage:No s/s present today  Summary:     Pt presenting with mild oral and mild- moderate pharyngeal dysphagia today  Symptoms or concerns included decreased bolus propulsion, decreased bolus formation, delayed oral intiation, suspected decreased control of larger sips of NTL and ice chips , piecemeal deglutition, suspected pharyngeal swallow delay, suspected decreased hyolaryngeal elevation upon palpation, suspected pharyngeal residue, multiple swallows, effortful swallow, audible swallows and strong cough response post weak swallow with ice chipsx1  Pt already OOB upon arrival however communicated oral care was not complete yet  SLP assisted with oral care using suction  Oral cavity moist and clean, no thrush or lesions noted  Pt voiced some nausea prior to meal, eating only a few bites of puree items but drinking 8oz liquids  Nursing provided Zofran mid session which pt stated \"it helps\"  Pt was able to recall some of her swallow strategies with meals but voice \"i'm afraid of choking\"  Pt encouraged to go slow, thoroughly manipulate items and complete effortful swallows and double swallows as able  Pt tolerated puree items well, overall mild manpulation and delayed transfers and swallows  NTL by cup were observed, noted more prompt transfers and swallows  Swallows were audible and cues provided for protective throat clear and re-swallow   Pt completed ice chips x5- encouraged chewing and manipulation which appears improved and more functional with no anterior loss, suspect decreased " "posterior control and delayed swallow with incomplete rise/elevation  Pt noted one strong cough post uncoordinated swallow to which she was able to cough and clear appropriately but stated \"see that's why I'm scared\"  Educated on oral care and aspiration risk which she appeared aware  Pt communicated \"they say I'm not drinking enough, I'm always so thirsty  \" Pt encouraged to order extra NTL water as this was her preference this session  SLP communicated to dietary staff filling pantry to provide extra NTL water stocked as pt was educated to ring to ask for fresh/cold water as needed  At this time pt remains appropriate for current diet of puree/ NTL by cup  ST will f/u to assess pt use of swallow strategies, tolerance for current diet and trials of thin as able/ appropriate  Recommendations:  Recommended Diet:  puree/level 1 diet and nectar thick liquids by CUP ONLY-NO STRAWS  Recommended Form of Medications: whole with puree   Aspiration precautions and compensatory swallowing strategies: upright posture, only feed when fully alert, slow rate of feeding, small bites/sips, no straws, effortful swallow, cough every 4-5 bites/sips, quiet environment (tv off, limit talking, door closed, etc ), alternating bites and sips, OOB for ALL meals, MUST be AWAKE/ALERT for meals  Oral care with suction w/ after ALL meals  Once oral care was completed, pt is allowed ICE CHIPS between meals  However, pt MUST be FULLY upright when consuming ice chips  FULL supervision w/ meals     Results reviewed with:  patient, nursing     Aspiration precautions posted  Reflux precautions-->Remain upright at least 30 min post meals     F/u ST tx: Pt is recommended for further skilled SLP services targeting dysphagia therapy in order to maximize oral and pharyngeal swallow skills, while safely supporting PO intake, as well as to improve independent carryover of safe swallow strategies           Plan: Focus on use of swallow " strategies for ALL meals as pt cont to require increased cuing, which lack of use of strategies results in increased overt s/s aspiration             Monitor pt's current tolerance for diet             Initiate swallow exercises as able            Thin liquid trials under SLP supervision only   Swallow Assessment Prognosis   Prognosis Fair   Prognosis Considerations Age; Co-morbidities; Medical diagnosis; Medical prognosis; Severity of impairments;Previous level of function;New learning ability;Ability to carry over; Cooperation   SLP Therapy Minutes   SLP Time In 0800   SLP Time Out 0900   SLP Total Time (minutes) 60   SLP Mode of treatment - Individual (minutes) 60   SLP Mode of treatment - Concurrent (minutes) 0   SLP Mode of treatment - Group (minutes) 0   SLP Mode of treatment - Co-treat (minutes) 0   SLP Mode of Treatment - Total time(minutes) 60 minutes   SLP Cumulative Minutes 945   Therapy Time missed   Time missed?  No

## 2023-03-20 NOTE — PROGRESS NOTES
"   03/20/23 0900   Pain Assessment   Pain Assessment Tool 0-10   Pain Score 6   Pain Location/Orientation Orientation: Lower; Location: Back   Hospital Pain Intervention(s) Repositioned  (in bed at end of session)   Restrictions/Precautions   Precautions Aspiration;Bed/chair alarms;Cognitive; Fall Risk;Contact/isolation;Pain;Supervision on toilet/commode;Visual deficit   Cognition   Overall Cognitive Status Impaired   Subjective   Subjective \"i'm tired today\"   Sit to Lying   Type of Assistance Needed Supervision   Sit to Lying CARE Score 4   Sit to Stand   Type of Assistance Needed Incidental touching   Comment CS/CGA; occasional tipping of RW upon standing   Sit to Stand CARE Score 4   Bed-Chair Transfer   Type of Assistance Needed Incidental touching;Supervision; Adaptive equipment   Comment with RW   Chair/Bed-to-Chair Transfer CARE Score 4   Car Transfer   Type of Assistance Needed Incidental touching   Comment steadying of RW, but able to tx in and out on own   Car Transfer CARE Score 4   Walk 10 Feet   Type of Assistance Needed Incidental touching;Supervision; Adaptive equipment   Comment CS/CGA with RW   Walk 10 Feet CARE Score 4   Walk 50 Feet with Two Turns   Type of Assistance Needed Incidental touching;Supervision; Adaptive equipment   Comment CS/CGA with RW   Walk 50 Feet with Two Turns CARE Score 4   Ambulation   Distance Walked (feet) 50 ft  (x2, 100'x1)   Assist Device Roller Walker   Gait Pattern Inconsistant Amy; Slow Amy; Forward Flexion; Improper weight shift   Limitations Noted In Balance;Device Management; Endurance;Speed   Curb or Single Stair   Style negotiated Single stair   Type of Assistance Needed Incidental touching; Adaptive equipment   Comment B HRs   1 Step (Curb) CARE Score 4   4 Steps   Type of Assistance Needed Incidental touching; Adaptive equipment   Comment B HRs   4 Steps CARE Score 4   Stairs   # of Steps 4   Assist Devices Bilateral Rail   Findings limited by fatigue; descends " bwds   Therapeutic Interventions   Flexibility B heel cord and HS x5mins   Assessment   Treatment Assessment pt cont to demonstrate good carryover with safety with RW and scanning while walking  occasionally bumping into objects on L but able to fix safely with RW   cont to be CS/CGA due to visual deficits, L inattention and safety to decrease fall risk  cont to work on functional mobility and safety to maximize functional ind for d/c  Problem List Decreased strength;Decreased range of motion;Decreased endurance; Impaired balance;Decreased mobility; Decreased coordination;Decreased cognition;Decreased safety awareness; Impaired sensation   Barriers to Discharge Decreased caregiver support; Inaccessible home environment   PT Barriers   Functional Limitation Car transfers; Ramp negotiation;Stair negotiation;Standing;Transfers; Walking; Wheelchair management   Plan   Progress Progressing toward goals   Recommendation   PT Discharge Recommendation Post acute rehabilitation services   Equipment Recommended Walker; Wheelchair   PT Therapy Minutes   PT Time In 0900   PT Time Out 1000   PT Total Time (minutes) 60   PT Mode of treatment - Individual (minutes) 60   PT Mode of treatment - Concurrent (minutes) 0   PT Mode of treatment - Group (minutes) 0   PT Mode of treatment - Co-treat (minutes) 0   PT Mode of Treatment - Total time(minutes) 60 minutes   PT Cumulative Minutes 1368   Therapy Time missed   Time missed?  No

## 2023-03-20 NOTE — PROGRESS NOTES
PM&R PROGRESS NOTE:  Angelina Matthews 75 y o  female MRN: [id number]  Unit/Bed#: -01 Encounter: [id number]      Rehabilitation Diagnosis: Impairment of mobility, safety, Activities of Daily Living (ADLs), and cognitive/communication skills due to Stroke:  01 1  Left Body Involvement (Right Brain)    HPI: Angelina Matthews is a 75 y o  female with history of asthma, anxiety, bipolar depression, chronic narcotic use, previous enteroviral infection of the heart, seronegative arthropathy of multiple joints, undifferentiated connective tissue disorder, obstructive sleep apnea on home CPAP who presented to the [facility] on 2/22 for altered mental status  According to the patient's daughter on chart review, the patient's mental status had been declining for the prior 48 hours to admission  She did receive Narcan which improved her mental status slightly  Of note she had a recent stroke with revascularization on 2/12/2023 with Dr Leonarda Burden  She was seen by neurology with overall good strength except mildly weak in the lower extremities with brisk reflexes on the left and a positive Haroon's  At this time is felt to be potentially related to polypharmacy in the setting of chronic pain meds versus seizure in the setting of a recent stroke  She was initiated on 81 mg of aspirin daily with a plan of potentially transitioning to Eliquis, plan for EEG  CT of the head was negative  MRI was completed showing a new small acute/recent interval lacunar infarction in the right cerebral pedun there is jacinta  She was started on Eliquis  Nephrology was consulted as well for hypercalcemia  The patient was evaluated by the Rehabilitation team and deemed an appropriate candidate for comprehensive inpatient rehabilitation and admitted to the [facility] on 2/28/2023  3:41 PM patient was seen in room with brother at bedside and all questions regarding the acute inpatient rehabilitation program were answered  We also discussed at length course in the hospital and her last hospitalization to verify events and clarify any imaging or laboratory findings  Patient is eager to start her therapy program       SUBJECTIVE: Patient seen and evaluated at bedside  No acute issues over the weekend per staff, nursing  She feels a little bit tired this morning but overall like herself  Per reports had been doing well overnight off of the one-to-one observation and using the call bell appropriately  She did have an argument with her brother on Friday evening and developed some numbness in the face that subsided after he had left  I did speak with the brother that evening in order to discuss these events as well as answer all of his questions  Patient is doing well otherwise and discussed in morning stand up with plans for the CM to provide a list for subacute referrals at this stage  I will need to reach out to her pain physician in order to coordinate prescriptions for any pain medications  She denies any fever, chills, nausea, vomiting, cough, shortness of breath, diarrhea, constipation  Occasionally she wakes up at night not knowing where she is for a brief period of time but is not leading her to have any impulsive behavior  ASSESSMENT: Stable, progressing    PLAN:    Rehabilitation  • Functional deficits:  Self care and mobility  • Continue current rehabilitation plan of care to maximize function      • Functional update:   Physical Therapy Occupational Therapy Speech Therapy   Weight Bearing Status: Full Weight Bearing  Transfers: Incidental Touching, Minimal Assistance  Bed Mobility: Incidental Touching  Amulation Distance (ft): 100 feet  Ambulation: Incidental Touching  Assistive Device for Ambulation: Roller Walker  Wheelchair Mobility Distance: 100 ft  Wheelchair Mobility: Supervision  Number of Stairs: 6  Assistive Device for Stairs: Bilateral Hand Rails  Stair Assistance: Incidental Touching  Discharge Recommendations: Skilled Nursing Facility   Eating: Supervision  Grooming: Minimal Assistance  Bathing: Moderate Assistance  Bathing: Moderate Assistance  Upper Body Dressing: Moderate Assistance  Lower Body Dressing: Moderate Assistance  Toileting: Moderate Assistance  Tub/Shower Transfer: Moderate Assistance  Toilet Transfer: Minimal Assistance  Cognition: Exceptions to WNL  Cognition: Decreased Memory, Decreased Executive Functions, Decreased Attention, Decreased Safety  Orientation: Person, Place, Time, Situation   Mode of Communication: Verbal  Speech/Language: Dysarthia  Cognition: Exceptions to WNL  Cognition: Decreased Memory, Decreased Executive Functions, Decreased Attention, Decreased Comprehension, Decreased Safety, Impulsive  Orientation: Person, Place, Situation (fluctuates with times of increased confusion and hallucinations)  Swallowing: Exceptions to WNL  Swallowing: Oral Dysphagia, Pharyngeal Dysphagia, Esophageal Dysphagia, Aspiration Risk, Reflux Precautions  Diet Recommendations: Level 1/Cami Garvey Thick  Discharge Recommendations: Home with:  DC Home with[de-identified] 24 Hour Supervision, 24 Hour Assisteance, Family Support, Home Speech Therapy, Outpatient Speech Therapy       • Estimated Discharge: Transition to subacute rehabilitation    DVT prophylaxis  • On Eliquis     Pain  • Cymbalta 60 mg twice daily  • Gabapentin 100 mg twice daily  • Morphine 7 5 mg 4 times a day as needed  • Acetaminophen 650 mg every 6 hours as needed     Bladder plan  • Continent     Bowel plan  • Continent  • Last bowel movement 3/20     Code Status  • Level 1 full code      * Acute CVA (cerebrovascular accident) [facility])  Assessment & Plan  3/8 - neuro exam stable - continue to monitor closely   CT head 3/7 - Evolving right MCA territory infarcts with associated petechial hemorrhage which is best seen on the recent MRI examination  There is no discrete parenchymal hematoma   - discussed above with johny Hull - no acute bleeding noted   MRI brain 2/22 - 1  New small acute /more recent interval lacunar infarction in the right cerebral peduncle (series 4, image 14)  2   Extensive multifocal large, previously present right middle cerebral artery infarction which is evolving with superimposed areas of evolving hemorrhage likely related to hemorrhagic transformation of subacute right MCA infarction  No significant   mass effect  3   Mild, chronic microangiopathy  S/P thrombectomy 2/12 by Dr Stefany Shah   Currently on a dysphagia 1 diet with thin liquids  Secondary stroke prophylaxis at this time with Eliquis 5 mg twice daily (aspirin d/c'd earlier); and statin; optimal BP control   PT, OT, SLP  Consideration for neuropsychology if patient agreeable  Review modifiable risk factors and provide stroke education  Monitor for poststroke pain  Close neuro follow-up as patient has had multiple strokes in the last 2 months    Hypokalemia  Assessment & Plan  Hypokalemia with potassium down to 3 4 given 40 mill equivalents today on 3/10 but IV infiltrated  Able to get PO dose over weekend  Potasium now WNL  Recheck BMP as of 3/20 up to 3 7 and stable    Wound of left ankle  Assessment & Plan  On lateral ankle possibly occurred at time of fall, reviewed wound getting SilvaSorb examined today on 3/15 with nursing  - Wound care c/s  1  Cleanse L lateral ankle wound with NSS, pat dry, and apply silvasorb gel to the wound bed  Cover with Allevyn foam dressing  Isacc dressing with T  Change every day and as needed for soilage/dislodgement  2  Preventative nursing skin care orders placed  3   Pressure relief, turning and repositioning with foam offloading wedges and waffle cushion to the care  Prevalon boot     Dysphagia  Assessment & Plan  - Current diet: Modified - D1, NTL after failed VBS, GI consulted, plan for esophogram, protonix added  - Recommend VBS study-VBS completed on 3/9 with evidence for esophageal dysmotility and dysphagia recommending puréed with nectar thick, okay for ice chips  - SLP evaluate and treat decline in swallowing   - Aspiration precautions; HOB completely upright when eating, 100% supervision, monitor for pocketing  - Hold oral intake if patient too confused or lethargic and notify MD   - Oral care with meals and bedtime  - Ensure adequate hydration  - Nutrition consult to assist with management   - Rehab nursing and staff to ensure safe and appropriate feeding to decrease risk of aspiration     - Rehab physician oversight dysphagia management and risks associated with it  - Monitor for appropriate fluid and nutrition intake      Gout of foot  Assessment & Plan  Much improved on prednisone  Uric acid wnl   R 1st MTPJ redness and focal tenderness without significant edema or skin breakdown   - IM feels most c/w gout and have ordered prednisone - taper thru 3/12  - Monitor for improvement or non-improvement and other etiologies as well closely     AMS (altered mental status)  Assessment & Plan  Mentation improving as of 3/13 and more consistently after reduction in opioids as well as addition of Sensipar decreasing her overall calcium levels- Continues to be stable as of 3/17  3/7 Patient with some sleepiness at times apparently last several days with poor activity tolerance and now s/p fall - appears back to recent baseline on follow-up eval   CT head 3/7 - Evolving right MCA territory infarcts with associated petechial hemorrhage which is best seen on the recent MRI examination  There is no discrete parenchymal hematoma   - discussed above with radiologist Della - no acute bleeding noted   - Ensure adequate nutrition and hydration with altered diet and sub-optimal intake BUN/Cr acceptable   - Continue Eliquis, monitor vitals closley  - Comgmt with IM team   - Patient afebrile; CBC wnl; LFTs wnl; Ammonia wnl  - Continue Abx of UTI   - Calcium elevated > optimal mgmt per endo > adding Sensipar   - Hx of SHAR - continue 2L at night O2 with spot checks - refusing CPAP - obtain noct ox on 2L   - Hold standing long-acting MS Contin 15mg Q12H and transitioned to MS IR 7 5mg Q6H PRN   - Gabapentin 100mg BID weaned down recently            UTI (urinary tract infection)  Assessment & Plan  UA last week with culture growing greater than 100,000 CFU Morganella and Serratia  Started on Ciprofloxacin but transitioned to Cefpodixime per prior providers with plan to continue into 3/10   Dysuria improving     History of stroke  Assessment & Plan  Patient with recent admission in early 2023 for right MCA CVA with petechial hemorrhage and residual left-sided weakness and dysarthria  Patient had mechanical thrombectomy with Dr Geovani Steven on 2/12    Obstructive sleep apnea  Assessment & Plan  Patient refusing CPAP 2/2 claustrophobia - refuses home or hospital CPAP   2L O2 at night with spot checks   Obtain noct ox on 2L; repeat noct ox prior to d/c for possible home DME     History of bacteremia  Assessment & Plan  Unclear etiology initially of whether her altered mental status was related to stroke versus polypharmacy versus opioid overdose versus infection versus seizure  1/2 micrococcus with a second set staph coag negative and followed by infectious disease after course of antibiotic treatment  Has been off of antibiotics and repeat blood cultures negative to date, ID felt may be contaminant    Discoloration of skin of foot  Assessment & Plan  N/V intact - no significant discoloration today   Per daughter and patient patient's foot will become blue or red and change colors frequently  No evidence for infectious etiology and had a lower extremity arterial duplex with no evidence of any significant arterial occlusive disease  May be related to the seronegative spondyloarthropathies  Likely the source of of the inaccurate and inconsistent pulse oximetry readings in the hands as well    Hypercalcemia  Assessment & Plan  Nephrology had been following, nonsuppressed "PTH with concerns for primary hyperparathyroidism - they defer to endo on mgmt   C spine XR 3/7 - Salt-and-pepper appearance of the skull is partially accentuated by the image processing /sharpening algorithm, though prior head CT suggests this is a true finding  Correlate for clinical/laboratory features of hyperparathyroidism; Salt-and-pepper appearance of the skull accentuated by image processing/sharpening algorithm  However, prior head CT shows a mottled appearance of the skull as well suggesting hyperparathyroidism  No focal lytic or blastic lesions  No acute fractures  Endocrine consult   \"SPEP-no monoclonal bands, UPEP- no monoclonal bands, PTH RP less than 2, 25-hydroxy vitamin D 41 7, 1,25 hydroxy vitamin D 23 7 low, Phos 2 5 low, PTH 65 4   Assessment/Plan:  1  Hypercalcemia: Suspected to be on the basis of primary hyperparathyroidism: Corrected calcium continues to increase slightly  Sensipar 30 mg daily was started and is being tolerated  Given history of osteoporosis, if appropriate surgical candidate consider surgery referral as outpatient  Follow-up with endocrinology as outpatient  Monitor corrected calcium level  Adjust dose as needed  Status post a CT of the chest abdomen and pelvis with no overt malignancy  - Patient has allergy to contrast - discussed with endo fellow  -Calcium as of 3/20 continues to come down with the use of Sensipar    Most recently down to 8 8 from a peak of 12      HTN (hypertension)  Assessment & Plan  Patient with some orthostasis  IM consulted and with overall management at their discretion during ARC course  Monitor orthostatics; PRN ADDY hose, abdominal binder - ensure adequate hydration   Currently on metoprolol succinate 25 mg twice daily with holding parameters   Plan per cardiology did not add an ARB in the setting of heart failure however as blood pressure allows    Bipolar depression ([facility] [address] )  Assessment & Plan  Currently on Wellbutrin 100 mg twice daily, " "Lamictal 150 mg nightly, and Cymbalta 60 mg twice daily  Consistent with home regimen  Continue monitoring for stability, mood lability  Consider neuropsychology consultation if patient agreeable    Fall  Assessment & Plan  3/7 early morning - per report \"unwitnessed FALL; Per RN patient set off bed alarm, but has already fallen onto her left side  Confused and wanting to wash her face  Once more awake, mentation improved  \"  - CTH evolving infarct without new bleeding per rads  - L knee mild pain but good ROM without significant edema or instability > improving - XR unremarkable - monitor   - Mild L hip pain but good ROM and can adequately WB > improving - L hip/pelvis XR - mild hip OA without acute findings -  monitor  - Some L shoulder pain - some mildly decreased AROM, +Muñoz > stable -  L shoulder XR - unremarkable - monitor    - Subtle neck pain but full ROM without radicular complaints or focal weakness, sensory changes > improving - C spine XR - no acute findings or change from prior - chronic changes > Chronic hyperkyphosis  Anterolisthesis at C4-5 is unchanged  No acute subluxation; Salt-and-pepper appearance of the skull accentuated by image processing/sharpening algorithm  Multilevel moderate to severe discogenic and uncovertebral degenerative changes greatest at C5-6 and C6-7  Multilevel mild facet degenerative changes greatest at C4-5  > monitor   - Optimal mgmt with wakefulness/AMS - continue 2L at night O2 with spot checks - refusing CPAP  - Has been demonstrating use of call bell and not getting up overnight without assistance  After team discussion, plan for discontinuing observation  Cervical spondylosis  Assessment & Plan  C spine XR 3/7 - Chronic hyperkyphosis  Anterolisthesis at C4-5 is unchanged  No acute subluxation; Salt-and-pepper appearance of the skull accentuated by image processing/sharpening algorithm    However, prior head CT shows a mottled appearance of the skull as well " suggesting hyperparathyroidism  No focal lytic or blastic lesions  No acute fractures  Multilevel moderate to severe discogenic and uncovertebral degenerative changes greatest at C5-6 and C6-7    Multilevel mild facet degenerative changes greatest at C4-5   - No signs of myelopathy or radiculopathy - monitor     Osteoporosis  Assessment & Plan  History of an L1 compression fracture status post kyphoplasty  Was previously on Prolia has not been on since 2020  Outpatient referral to endocrine    CHF (congestive heart failure) ([facility] [address] )  Assessment & Plan  Wt Readings from Last 3 Encounters:   03/15/23 59 2 kg (130 lb 8 2 oz)   02/16/23 62 6 kg (138 lb)   02/12/23 67 9 kg (149 lb 11 1 oz)       Cardiomyopathy with reduced ejection fraction as low as 10 to 15% back in 2004 but has improved since and most recently was 25 to 30% on last admission  CXR 3/7 unremarkable; does not appear volume overloaded   Patient was offered LifeVest but declined  On metoprolol succinate 25 mg twice daily, with plan to add an ARB as her blood pressure would allow  Strict I's and O's and weights to be checked every Monday Wednesday Friday  Follows with Dr Ana Schaefer at [facility]      Seronegative arthropathy of multiple sites [facility])  [address] with rheumatology and is on Plaquenil 200 mg Monday through Friday and 400 mg on Saturday Sunday  Continue home regimen and follow-up with rheumatology on discharge    Asthma  Assessment & Plan  3/8 saturating well on RA/2L   3/7 afternoon - patient had questionable desat with therapy but had full improved mentation and was in no resp or other distress at that time  - Comgmt with IM who also evaluated patient at this time    - She historically has had difficulty obtaining O2 from finger tips at times - when pulse ox moved to toe she had good oxygenation on RA-2L  - CXR no acute findings - bibasilar scarring   - Continue use of albuterol inhaler 2 puffs every 6 hours as "needed    Chronic pain syndrome  Assessment & Plan  History of chronic pain secondary to compression fractures in her thoracic spine and chronic rib fractures   Patient with over sleepiness at times at sedating meds decreased some recently   · Holding standing long-acting MS Contin 15mg Q12H and transitioned to MS IR 7 5mg Q6H PRN > pain still adequately controlled > monitor closely   · Gabapentin 100mg BID   · Cymbalta 60 milligrams twice daily  · Acetaminophen 650 mg every 6 hours as needed  · Was previously on oxycodone as needed at [facility] but per family due to concerns for polypharmacy and additional opioids beyond her morphine has not been on an acute care since 2/16  · Lidoderm and Bengay  Aqua K also ordered with instructions not to place over same area  Appreciate IM consultants medical co-management  Labs, medications, and imaging personally reviewed  ROS:  A ten point review of systems was completed on 03/20/23 and pertinent positives are listed in subjective section  All other systems reviewed were negative  OBJECTIVE:   /65   Pulse 88   Temp (!) 97 3 °F (36 3 °C) (Axillary)   Resp 16   Ht 5' 3\" (1 6 m)   Wt 59 2 kg (130 lb 8 2 oz)   SpO2 96%   BMI 23 12 kg/m²     Physical Exam  Vitals and nursing note reviewed  Constitutional:       General: She is not in acute distress  Appearance: Normal appearance  HENT:      Head: Atraumatic  Comments: Left-sided facial droop     Right Ear: External ear normal       Left Ear: External ear normal       Nose: Nose normal  No rhinorrhea  Mouth/Throat:      Mouth: Mucous membranes are moist       Pharynx: Oropharynx is clear  Eyes:      General: No scleral icterus  Cardiovascular:      Rate and Rhythm: Normal rate  Pulses: Normal pulses  Heart sounds: Normal heart sounds  Pulmonary:      Effort: Pulmonary effort is normal  No respiratory distress  Breath sounds: Normal breath sounds   " Abdominal:      General: There is no distension  Palpations: Abdomen is soft  Musculoskeletal:      Right lower leg: No edema  Left lower leg: No edema  Skin:     General: Skin is warm and dry  Coloration: Skin is pale  Neurological:      Mental Status: She is oriented to person, place, and time  Sensory: No sensory deficit        Comments: Left hemiparesis and dysarthria, some left-sided neglect   Psychiatric:         Mood and Affect: Mood normal          Behavior: Behavior normal           Lab Results   Component Value Date    WBC 7 43 03/20/2023    HGB 12 4 03/20/2023    HCT 39 0 03/20/2023    MCV 99 (H) 03/20/2023     03/20/2023     Lab Results   Component Value Date    SODIUM 143 03/20/2023    K 3 7 03/20/2023     (H) 03/20/2023    CO2 28 03/20/2023    BUN 13 03/20/2023    CREATININE 0 82 03/20/2023    GLUC 88 03/20/2023    CALCIUM 8 8 03/20/2023     Lab Results   Component Value Date    INR 0 95 02/22/2023    INR 0 90 02/12/2023    INR 1 12 04/22/2022    PROTIME 12 8 02/22/2023    PROTIME 12 1 02/12/2023    PROTIME 14 0 04/22/2022           Current Facility-Administered Medications:   •  acetaminophen (TYLENOL) tablet 650 mg, 650 mg, Oral, Q6H PRN, Raynold Clunes, DO, 650 mg at 03/18/23 9267  •  albuterol (PROVENTIL HFA,VENTOLIN HFA) inhaler 2 puff, 2 puff, Inhalation, Q6H PRN, Raynold Clunes, DO, 2 puff at 03/07/23 1358  •  aluminum-magnesium hydroxide-simethicone (MYLANTA) oral suspension 30 mL, 30 mL, Oral, Q4H PRN, Raynold Clunes, DO, 30 mL at 03/18/23 1726  •  apixaban (ELIQUIS) tablet 5 mg, 5 mg, Oral, BID, Raynold Clunes, DO, 5 mg at 03/20/23 0801  •  atorvastatin (LIPITOR) tablet 40 mg, 40 mg, Oral, QPM, Raynold Clunes, DO, 40 mg at 03/19/23 1814  •  bisacodyl (DULCOLAX) rectal suppository 10 mg, 10 mg, Rectal, Once, Roxana Alan MD  •  buPROPion [facility]) tablet 100 mg, 100 mg, Oral, BID, Syl Cline DO, 100 mg at 03/20/23 0801  •  cinacalcet (SENSIPAR) tablet 30 mg, 30 mg, Oral, Daily With Breakfast, Anika Franks MD, 30 mg at 03/20/23 0801  •  DULoxetine (CYMBALTA) delayed release capsule 60 mg, 60 mg, Oral, BID, Carie Risk, DO, 60 mg at 03/20/23 0800  •  gabapentin (NEURONTIN) capsule 100 mg, 100 mg, Oral, BID, Deepti Morin MD, 100 mg at 03/20/23 0800  •  glycerin-hypromellose- (ARTIFICIAL TEARS) ophthalmic solution 1 drop, 1 drop, Both Eyes, Q4H PRN, Carie Risk, DO, 1 drop at 03/19/23 1944  •  hydroxychloroquine (PLAQUENIL) tablet 200 mg, 200 mg, Oral, Once per day on Mon Tue Wed Thu Fri, Carloz Kirkland, DO, 200 mg at 03/20/23 1895  •  hydroxychloroquine (PLAQUENIL) tablet 400 mg, 400 mg, Oral, Once per day on Sun Sat, Carloz Parnell, DO, 400 mg at 03/19/23 1910  •  lamoTRIgine (LaMICtal) tablet 150 mg, 150 mg, Oral, HS, Carie Risk, DO, 150 mg at 03/19/23 2120  •  lidocaine (LIDODERM) 5 % patch 1 patch, 1 patch, Topical, Daily, Piotr Tang MD, 1 patch at 03/20/23 6855  •  LORazepam (ATIVAN) tablet 0 25 mg, 0 25 mg, Oral, BID PRN, Deepti Morin MD, 0 25 mg at 03/19/23 2123  •  melatonin tablet 3 mg, 3 mg, Oral, HS, Maddie Peters, MATTINP, 3 mg at 03/19/23 2120  •  menthol-methyl salicylate (BENGAY) 19-47 % cream, , Apply externally, 4x Daily PRN, Piotr Tang MD, Given at 03/06/23 1227  •  metoprolol succinate (TOPROL-XL) 24 hr tablet 25 mg, 25 mg, Oral, BID, Michaela Pickard, CRNP, 25 mg at 03/20/23 0802  •  morphine (MSIR) IR tablet 7 5 mg, 7 5 mg, Oral, Q6H PRN, Deepti Morin MD, 7 5 mg at 03/20/23 1015  •  ondansetron (ZOFRAN-ODT) dispersible tablet 4 mg, 4 mg, Oral, Q6H PRN, Carie Risk, DO, 4 mg at 03/20/23 0847  •  pantoprazole (PROTONIX) EC tablet 40 mg, 40 mg, Oral, Early Morning, Marlene Nesbitt MD, 40 mg at 03/20/23 0536  •  polyethylene glycol (MIRALAX) packet 17 g, 17 g, Oral, Daily PRN, Carie Risk, DO, 17 g at 03/04/23 1930  •  prazosin (MINIPRESS) capsule 1 mg, 1 mg, Oral, HS, Carie Risk, DO, 1 mg at 03/15/23 2110  •  senna-docusate sodium (SENOKOT S) 8 6-50 mg per tablet 1 tablet, 1 tablet, Oral, BID, Anel Arrieta DO, 1 tablet at 03/19/23 1814  •  sodium chloride (OCEAN) 0 65 % nasal spray 1 spray, 1 spray, Each Nare, Q1H PRN, Kenyon Maldonado MD, 1 spray at 03/08/23 0300  •  white petrolatum-mineral oil (EUCERIN,HYDROCERIN) cream, , Topical, TID, Kenyon Maldonado MD, Given at 03/20/23 0803    Past Medical History:   Diagnosis Date   • Anxiety    • Asthma    • Bipolar depression ([place] [address] )    • Chronic narcotic dependence ([place] [address] )    • Chronic pain    • Depression    • Enterovirus heart infection    • SHAR (obstructive sleep apnea)    • Osteoarthritis    • Peripheral neuropathy    • Plantar fasciitis of right foot    • Senile osteoporosis    • Seronegative arthropathy of multiple sites [facility])    • Undifferentiated connective tissue disease ([place] [address] )        Patient Active Problem List    Diagnosis Date Noted   • Acute CVA (cerebrovascular accident) ([place] [address] ) 02/23/2023   • Hypokalemia 03/10/2023   • AMS (altered mental status) 03/09/2023   • Gout of foot 03/09/2023   • Dysphagia 03/09/2023   • Wound of left ankle 03/09/2023   • UTI (urinary tract infection) 03/06/2023   • Obstructive sleep apnea 02/28/2023   • History of stroke 02/28/2023   • History of bacteremia 02/24/2023   • Hypercalcemia 02/22/2023   • Discoloration of skin of foot 02/22/2023   • Abdominal pain 02/22/2023   • HFrEF (heart failure with reduced ejection fraction) ([place] [address] ) 02/13/2023   • Stroke ([place] [address] ) 02/12/2023   • HTN (hypertension) 02/12/2023   • Cardiomyopathy ([place] [address] ) 02/07/2023   • Vitamin D insufficiency 02/07/2023   • LAILA (acute kidney injury) ([place] [address] ) 04/23/2022   • Polypharmacy 04/23/2022   • 'light-for-dates' infant with signs of fetal malnutrition 04/22/2022   • Bipolar depression ([facility] [address] ) 03/04/2022   • Rheumat[facility] ([facility] ) 03/04/2022   • Closed fracture of multiple ribs of right side 03/02/2022   • Traumatic pneumothorax 03/02/2022 • Closed fracture of transverse process of lumbar vertebra ([place] [address] ) 03/02/2022   • Liver contusion 03/02/2022   • Fall 03/02/2022   • Acute pain due to trauma 03/02/2022   • Right shoulder pain 03/02/2022   • Severe protein-calorie malnutrition ([place] [address] ) 03/02/2022   • Undifferentiated connective tissue disease ([place] [address] ) 12/29/2021   • Primary generalized (osteo)arthritis 12/29/2021   • Peripheral neuropathy 12/29/2021   • Osteoporosis 12/29/2021   • Hx of compression fracture of spine 12/29/2021   • Lumbar spondylosis 12/29/2021   • Cervical spondylosis 12/29/2021   • Anemia 06/26/2021   • Chronic pain syndrome    • Asthma    • Ambulatory dysfunction    • Seronegative arthropathy of multiple sites ([place] [address] )    • Gastroesophageal reflux disease without esophagitis    • Mixed stress and urge urinary incontinence    • CHF (congestive heart failure) ([place] [address] )         Royce Smiht,   Physical Medicine and [facility]    I have spent a total time of 35 minutes on 03/20/23 in caring for this patient including Diagnostic results, Instructions for management, Impressions, Documenting in the medical record, Reviewing / ordering tests, medicine, procedures   and Communicating with other healthcare professionals     Reviewed patient's labs including improvement in her hypercalcemia and hypokalemia, discussing events of Friday evening argument with family members, ongoing functional and medical plan of care

## 2023-03-20 NOTE — PROGRESS NOTES
Internal Medicine Progress Note  Patient: Yasmin Ventura  Age/sex: 75 y o  female  Medical Record #: [id number]      ASSESSMENT/PLAN: (Interval History)  Yasmin Ventura is seen and examined and management for following issues:    Recent right MCA infarct W/petechial hemorrhage  • S/P thrombectomy by Dr Mcdonnell Ranks 2/12  • Continue Eliquis 5 mg twice daily/statin   • Follow-up with neurosurgery/neurology as scheduled     NICM  • EF has been as low as 10 to 15% in the past  • In 2022 was 65%  • Now down to 30% = pt declined LifeVest  • Cardiology felt this was secondary to her recent CVA as well as possible medication noncompliance  • They recommend continuing beta-blocker and adding ARB when BP allows  • Will need outpatient ischemia work-up  • Followed by Dr Nahid Quinones at [facility] however would like to follow with [facility] on dc  • Can add arb if bp improves     Seronegative rheumatoid arthritis  • A/T rheumatology takes Plaquenil 200 mg Monday through Friday 400 mg on Saturday and Sunday  • Continue same  • Follow-up on discharge     Hypercalcemia  · Seen by renal/endocrine on acute side  · Started on Sensipar and now down to 8 8  · Endocrine input appreciated  Will need outpt follow-up and potentially a surgical evaluation      Fall  · 3/6/23 onto left side  · Abrasion on left knee and Xray negative for fracture  · CT and Xrays negative     R great toe gout  · S/p prednisone  · Uric acid normal  · improved     Chronic pain  • Due to compression fractures of her thoracic spine/chronic rib fractures  • Lethargic 3/7 and MS contin changed to IR  • Generally takes about 1-2 doses daily  • Pain management per PMR  • Pain stable     Bipolar disorder  • Continue current medication regimen  • Follow-up with psychiatry on discharge  • Neuropsych following    Anxiety  · Cont current meds  · Moods are stable    Confusion  · Improved  · Off continuous observation    HSAR  • Does not use CPAP at home d/t claustraphobia  • OK use oxygen overnight     Dysuria/Urinary frequency/incontinence  • Urine culture +CRE  • s/p vantin 200mg bid through 3/9/23  • resolved    Hypokalemia  · stable      DC planning: 3/22/23=await SNF    The above assessment and plan was reviewed and updated as determined by my evaluation of the patient on 3/20/2023      Labs:   Results from last 7 days   Lab Units 03/20/23  0558   WBC Thousand/uL 7 43   HEMOGLOBIN g/dL 12 4   HEMATOCRIT % 39 0   PLATELETS Thousands/uL 213     Results from last 7 days   Lab Units 03/20/23  0558 03/16/23  0705   SODIUM mmol/L 143 141   POTASSIUM mmol/L 3 7 3 8   CHLORIDE mmol/L 111* 112*   CO2 mmol/L 28 28   BUN mg/dL 13 16   CREATININE mg/dL 0 82 0 91   CALCIUM mg/dL 8 8 9 1                   Review of Scheduled Meds:  Current Facility-Administered Medications   Medication Dose Route Frequency Provider Last Rate   • acetaminophen  650 mg Oral Q6H PRN Kobi Samson DO     • albuterol  2 puff Inhalation Q6H PRN Kobi Samson DO     • aluminum-magnesium hydroxide-simethicone  30 mL Oral Q4H PRN Kobi Samson DO     • apixaban  5 mg Oral BID Kobi Samson DO     • atorvastatin  40 mg Oral QPM Kobi Samson DO     • bisacodyl  10 mg Rectal Once Misha Godoy MD     • buPROPion  100 mg Oral BID Kobi Samson DO     • cinacalcet  30 mg Oral Daily With Breakfast Anika Robles MD     • DULoxetine  60 mg Oral BID Kobi Samson DO     • gabapentin  100 mg Oral BID Misha Godoy MD     • glycerin-hypromellose-  1 drop Both Eyes Q4H PRN Kobi Samson DO     • hydroxychloroquine  200 mg Oral Once per day on Mon Tue Wed Thu Fri Kobi Samson DO     • hydroxychloroquine  400 mg Oral Once per day on Sun Sat Kobi Samson DO     • lamoTRIgine  150 mg Oral HS Kobi Samson DO     • lidocaine  1 patch Topical Daily Yessy Chatman MD     • LORazepam  0 25 mg Oral BID PRN Misha Godoy MD     • melatonin  3 mg Oral HS LAURA Ennis • menthol-methyl salicylate   Apply externally 4x Daily PRN Aldair Camara MD     • metoprolol succinate  25 mg Oral BID LAURA Jackson     • morphine  7 5 mg Oral Q6H PRN Desirae Ryan MD     • ondansetron  4 mg Oral Q6H PRN Altaf Guardado DO     • pantoprazole  40 mg Oral Early Morning Mandy Vega MD     • polyethylene glycol  17 g Oral Daily PRN Altaf Guardado DO     • prazosin  1 mg Oral HS Altaf Guardado DO     • senna-docusate sodium  1 tablet Oral BID Altaf Guardado DO     • sodium chloride  1 spray Each Nare Q1H PRN Maren Wang MD     • white petrolatum-mineral oil   Topical TID Maren Wang MD         Subjective/ HPI: Patient seen and examined  Patients overnight issues or events were reviewed with nursing or staff during rounds or morning huddle session  New or overnight issues include the following:     Pt seen in therapy  No overnight issues  She is understandably nervous regarding going to subacute  She has no pain currently, no other complaints    ROS:   A 10 point ROS was performed; negative except as noted above  Imaging:     FL barium swallow video w speech   Final Result by SYSTEMGENERATED, DOCUMENTATION (03/09 1306)      FL barium swallow video w speech   Final Result by  (03/09 1550)      XR hip/pelv 2-3 vws left if performed   Final Result by Castillo Carbajal MD (03/08 1633)      No acute osseous abnormality  Workstation performed: [id number]         XR shoulder 2+ vw left   Final Result by Castillo Carbajal MD (03/08 1636)      No acute osseous abnormality  Workstation performed: [id number]         XR spine cervical 2 or 3 vw injury   Final Result by Castillo Carbajal MD (03/08 1643)      No acute osseous abnormality or significant change from priors  Salt-and-pepper appearance of the skull is partially accentuated by the image processing /sharpening algorithm, though prior head CT suggests this is a true finding  Correlate for clinical/laboratory features of hyperparathyroidism  Workstation performed: [id number]         XR chest portable   Final Result by Jill Smith MD (03/07 1650)      No acute cardiopulmonary disease  Workstation performed: [id number]         CT head wo contrast   Final Result by Alana Ervin MD (03/07 1358)      Evolving right MCA territory infarcts with associated petechial hemorrhage which is best seen on the recent MRI examination  There is no discrete parenchymal hematoma  Workstation performed: [id number]         XR knee 1 or 2 vw left   Final Result by Jill Smith MD (03/07 9267)      No acute osseous abnormality  Workstation performed: [id number]             *Labs /Radiology studies reviewed  *Medications reviewed and reconciled as needed  *Please refer to order section for additional ordered labs studies  *Case discussed with primary attending during morning huddle case rounds    Physical Examination:  Vitals:   Vitals:    03/19/23 1436 03/19/23 2045 03/20/23 0556 03/20/23 0802   BP: 95/56 91/54 117/71 105/65   BP Location: Right arm Left arm Right arm    Pulse: 93 86 87 88   Resp: 20 16 16    Temp: (!) 97 4 °F (36 3 °C) (!) 97 4 °F (36 3 °C) (!) 97 3 °F (36 3 °C)    TempSrc: Oral Axillary Axillary    SpO2: 95% 98% 96%    Weight:       Height:           GEN: No apparent distress, interactive pleasant  NEURO: Alert and oriented x3  HEENT: Pupils are equal and reactive, EOMI, mucous membranes are moist, face symmetrical  CV: S1 S2 regular, no MRG, no peripheral edema noted  RESP: Lungs are clear bilaterally, no wheezes, rales or rhonchi noted, on room air, respirations easy and non labored  GI: Flat, soft non tender, non distended; +BS x4  : Voiding without difficulty  MUSC: Moves all extremities  SKIN: skin is pale, poor turgor, scattered ecchymosis on b/l upper extremities   Her hands are dry with scratch marks      The above physical exam was reviewed and updated as determined by my evaluation of the patient on 3/20/2023  Invasive Devices     None                    VTE Pharmacologic Prophylaxis: Eliquis  Code Status: Level 1 - Full Code  Current Length of Stay: 20 day(s)      Total time spent:  30 minutes  with more than 50% spent counseling/coordinating care  Counseling includes discussion with patient re: progress  and discussion with patient of his/her current medical state/information  Coordination of patient's care was performed in conjunction with primary service  Time invested included review of patient's labs, vitals, and management of their comorbidities with continued monitoring  In addition, this patient was discussed with medical team including physician and advanced extenders  The care of the patient was extensively discussed and appropriate treatment plan was formulated unique for this patient  Medical decision making for the day was made by supervising physician unless otherwise noted in their attestation statement  ** Please Note:  voice to text software may have been used in the creation of this document   Although proof errors in transcription or interpretation are a potential of such software**

## 2023-03-20 NOTE — PROGRESS NOTES
"   03/20/23 1020   Pain Assessment   Pain Assessment Tool 0-10   Pain Score 5   Pain Location/Orientation Orientation: Lower; Location: Back   Restrictions/Precautions   Precautions Aspiration;Bed/chair alarms;Cognitive; Fall Risk;Supervision on toilet/commode;Pain  (L inattention)   Weight Bearing Restrictions No   ROM Restrictions No   Lifestyle   Autonomy \"Today is a tired day  \"   Sit to Stand   Type of Assistance Needed Physical assistance;Verbal cues   Physical Assistance Level 25% or less   Comment cues for proper hand placement   Sit to Stand CARE Score 3   Bed-Chair Transfer   Type of Assistance Needed Physical assistance; Adaptive equipment;Verbal cues   Physical Assistance Level 25% or less   Comment Min A w/ RW, ambulated to bathroom   Chair/Bed-to-Chair Transfer CARE Score 3   Toileting Hygiene   Type of Assistance Needed Physical assistance; Adaptive equipment;Verbal cues   Physical Assistance Level 26%-50%   Comment hygiene while seated w/ S  Min A in stance at [facility] for clothing management, A to fully manage over L hip  Toileting Hygiene CARE Score 3   Toilet Transfer   Type of Assistance Needed Physical assistance; Adaptive equipment;Verbal cues   Physical Assistance Level 25% or less   Comment BSC over toilet, vc's to fully square off prior to sitting   Toilet Transfer CARE Score 3   Neuromuscular Education   Functional Movement Patterns While seated EOB, focused on item retrieval on pt's L to facilitate L visual scanning and sustained cylidrical grasp patterm  Pt instructed to tap item to chin and grasp release to target on pt's R, target moved each w/ focus on hand to target and coordination  3x9 reps total tolerated well w/ rest breaks between sets to manage fatigue  Cognition   Overall Cognitive Status Impaired   Assessment   Treatment Assessment Pt seen for skilled OT session focusing on toileting, short distance fxnl mobility w/ RW, and LUE NMR   Pt limited by fatigue this session, req inc time to " arouse initially however pt then agreeable to participate  Cont OT POC: LUE NMR, RW management, fxnl standing tolerance, ADL retraining specifically toileting and clothing management  From OT standpoint, recommending EDWIN  Pt requested to rest in bed, all needs within reach and alarm activated  Prognosis Fair   Problem List Decreased strength;Decreased range of motion;Decreased endurance; Impaired balance;Decreased mobility; Decreased coordination;Decreased cognition;Decreased safety awareness; Impaired sensation   Recommendation   OT Discharge Recommendation Post acute rehabilitation services   OT Therapy Minutes   OT Time In 1020   OT Time Out 1050   OT Total Time (minutes) 30   OT Mode of treatment - Individual (minutes) 30   OT Mode of treatment - Concurrent (minutes) 0   OT Mode of treatment - Group (minutes) 0   OT Mode of treatment - Co-treat (minutes) 0   OT Mode of Treatment - Total time(minutes) 30 minutes   OT Cumulative Minutes 2159   Therapy Time missed   Time missed?  No

## 2023-03-20 NOTE — PROGRESS NOTES
03/20/23 1330   Pain Assessment   Pain Assessment Tool 0-10   Pain Score No Pain   Restrictions/Precautions   Precautions Aspiration;Bed/chair alarms;Cognitive; Fall Risk;Supervision on toilet/commode;Visual deficit   Subjective   Subjective tired at end of session, requesting to sit in wc vs recliner at bedside for comfort   Sit to Stand   Type of Assistance Needed Incidental touching   Sit to Stand CARE Score 4   Bed-Chair Transfer   Type of Assistance Needed Physical assistance;Verbal cues; Adaptive equipment   Physical Assistance Level 25% or less   Comment chair to chair with RW, tired from therapy earlier in day, Assist for balance and cueing to stay within RW while turning   Chair/Bed-to-Chair Transfer CARE Score 3   Walk 10 Feet   Type of Assistance Needed Incidental touching; Adaptive equipment   Comment RW   Walk 10 Feet CARE Score 4   Walk 50 Feet with Two Turns   Type of Assistance Needed Incidental touching; Adaptive equipment   Comment RW, cueing to stay within RW while turning  Will run into objects on floor on her left, but does not have LOB and is able to self correct   Walk 50 Feet with Two Turns CARE Score 4   Ambulation   Primary Mode of Locomotion Prior to Admission Walk   Distance Walked (feet) 50 ft   Assist Device Roller Walker   Walk Assist Level Contact Guard   Does the patient walk? 2  Yes   Toilet Transfer   Type of Assistance Needed Physical assistance; Adaptive equipment   Physical Assistance Level 25% or less   Comment RW and grab bar   Toilet Transfer CARE Score 3   Toilet Transfer   Surface Assessed Raised Toilet   Therapeutic Interventions   Neuromuscular Re-Education woked on waking with RW, scanning environment to navigate obstacles within gym walking chair to chair  Performed 5 bouts   Assessment   Treatment Assessment Pt seen for 30 min session for continued work on wlaking, left side awareness, turns when approaching chair and sacnning to her let side   Pt getting outside of RW with turns as she fatigued     PT Therapy Minutes   PT Time In 1330   PT Time Out 1400   PT Total Time (minutes) 30   PT Mode of treatment - Individual (minutes) 30   PT Mode of treatment - Concurrent (minutes) 0   PT Mode of treatment - Group (minutes) 0   PT Mode of treatment - Co-treat (minutes) 0   PT Mode of Treatment - Total time(minutes) 30 minutes   PT Cumulative Minutes 1398

## 2023-03-20 NOTE — PLAN OF CARE
Problem: INFECTION - ADULT  Goal: Absence or prevention of progression during hospitalization  Description: INTERVENTIONS:  - Assess and monitor for signs and symptoms of infection  - Monitor lab/diagnostic results  - Monitor all insertion sites, i e  indwelling lines, tubes, and drains  - Monitor endotracheal if appropriate and nasal secretions for changes in amount and color  - Rand appropriate cooling/warming therapies per order  - Administer medications as ordered  - Instruct and encourage patient and family to use good hand hygiene technique  - Identify and instruct in appropriate isolation precautions for identified infection/condition  Outcome: Progressing

## 2023-03-20 NOTE — PROGRESS NOTES
"   03/20/23 1230   Pain Assessment   Pain Assessment Tool 0-10   Pain Score 3   Pain Location/Orientation Orientation: Lower; Location: Back   Restrictions/Precautions   Precautions Aspiration;Bed/chair alarms;Cognitive; Fall Risk;Contact/isolation;Pain;Supervision on toilet/commode;Visual deficit   Weight Bearing Restrictions No   ROM Restrictions No   Lifestyle   Autonomy \"My daughter is looking into places for me to go  \"   Eating   Type of Assistance Needed Verbal cues; Supervision   Physical Assistance Level No physical assistance   Comment pt finishing meal at start of session, pt reported inc nausea  RN made aware and pt unable to take medication for symptoms at this time, pt aware of same  Eating CARE Score 4   Lying to Sitting on Side of Bed   Type of Assistance Needed Incidental touching   Physical Assistance Level No physical assistance   Lying to Sitting on Side of Bed CARE Score 4   Sit to Stand   Type of Assistance Needed Incidental touching   Physical Assistance Level No physical assistance   Comment cues for proper hand placement   Sit to Stand CARE Score 4   Bed-Chair Transfer   Type of Assistance Needed Physical assistance; Adaptive equipment;Verbal cues   Physical Assistance Level 25% or less   Comment short distance mobility w/ RW  Chair/Bed-to-Chair Transfer CARE Score 3   Functional Standing Tolerance   Time 1 minute x4 trials   Activity static standing at RW w/ focus on proper hand placement and sustaining L gross grasp of RW  While in stance, pt w/ R unilateral release to maximize LUE weightbearing and inc L-attention  Pt tolerated well w/ seated rest breaks between stand trials  Coordination   Gross Motor Used yellow and red graded closepins w/ focus on L key grasp and three-jaw luis miguel, set-up on pt''s L to inc L-attention and visual tracking  Req pt to cross midline to R to place clips   Pt Min tactile cues and Mod vc's initially 2* impaired sensation w/ pt progressing w/ task without " closepin in hand, w/ task repetition improvement noted  x12 reps total, x2 dropped  Fine Motor While seated in w/c at table, engaged in act w/ wooden pegboard to faciliate L sustained tripod grasp/release, initially req Mod multimodal cues for L visual scanning and to inc focus to hand/digits to improve grasp pattern w/ demo provided  Act set-up at midline and tasked w/ crossing midline to place item in small container, w/ task progression req Min vc's to maximize technique  dropped 3 items  x26 reps total w/ inc time  Cognition   Overall Cognitive Status Impaired   Assessment   Treatment Assessment Pt seen for skilled OT session focusing on short distance w/ RW, repetitive safety training, and LUE NMR  Pt cont to be limited by L hemiparesis, L-inattention, and L visual field cut req cues to improve sequencing and safety w/ fxnl act  Overall improvements noted w/ activity tolerance, fxnl standing tolerance, and following 1-2 step directions compared to eval  Cont OT POC: LUE NMR, RW management, fxnl standing tolerance, ADL retraining specifically toileting and clothing management  From OT standpoint, recommending EDWIN  Prognosis Fair   Problem List Decreased strength;Decreased range of motion;Decreased endurance; Impaired balance;Decreased mobility; Decreased coordination;Decreased cognition;Decreased safety awareness; Impaired sensation   Plan   Treatment/Interventions ADL retraining;Functional transfer training; Therapeutic exercise; Endurance training;Cognitive reorientation;Patient/family training   Progress Progressing toward goals   Recommendation   OT Discharge Recommendation Post acute rehabilitation services   OT Therapy Minutes   OT Time In 1230   OT Time Out 1330   OT Total Time (minutes) 60   OT Mode of treatment - Individual (minutes) 60   OT Mode of treatment - Concurrent (minutes) 0   OT Mode of treatment - Group (minutes) 0   OT Mode of treatment - Co-treat (minutes) 0   OT Mode of Treatment - Total time(minutes) 60 minutes   OT Cumulative Minutes 1650   Therapy Time missed   Time missed?  No

## 2023-03-21 RX ADMIN — GABAPENTIN 100 MG: 100 CAPSULE ORAL at 17:26

## 2023-03-21 RX ADMIN — SENNOSIDES AND DOCUSATE SODIUM 1 TABLET: 8.6; 5 TABLET ORAL at 08:29

## 2023-03-21 RX ADMIN — BUPROPION HYDROCHLORIDE TABLETS 100 MG: 100 TABLET, FILM COATED ORAL at 08:29

## 2023-03-21 RX ADMIN — SENNOSIDES AND DOCUSATE SODIUM 1 TABLET: 8.6; 5 TABLET ORAL at 17:26

## 2023-03-21 RX ADMIN — CINACALCET 30 MG: 30 TABLET, FILM COATED ORAL at 06:33

## 2023-03-21 RX ADMIN — Medication: at 16:09

## 2023-03-21 RX ADMIN — ALUMINUM HYDROXIDE, MAGNESIUM HYDROXIDE, AND DIMETHICONE 30 ML: 200; 20; 200 SUSPENSION ORAL at 17:26

## 2023-03-21 RX ADMIN — PANTOPRAZOLE SODIUM 40 MG: 40 TABLET, DELAYED RELEASE ORAL at 05:08

## 2023-03-21 RX ADMIN — BUPROPION HYDROCHLORIDE TABLETS 100 MG: 100 TABLET, FILM COATED ORAL at 17:29

## 2023-03-21 RX ADMIN — ATORVASTATIN CALCIUM 40 MG: 40 TABLET, FILM COATED ORAL at 17:26

## 2023-03-21 RX ADMIN — MORPHINE SULFATE 7.5 MG: 15 TABLET ORAL at 15:18

## 2023-03-21 RX ADMIN — LIDOCAINE 5% 1 PATCH: 700 PATCH TOPICAL at 08:29

## 2023-03-21 RX ADMIN — GABAPENTIN 100 MG: 100 CAPSULE ORAL at 08:29

## 2023-03-21 RX ADMIN — APIXABAN 5 MG: 5 TABLET, FILM COATED ORAL at 08:29

## 2023-03-21 RX ADMIN — LAMOTRIGINE 150 MG: 100 TABLET ORAL at 20:49

## 2023-03-21 RX ADMIN — DULOXETINE HYDROCHLORIDE 60 MG: 60 CAPSULE, DELAYED RELEASE ORAL at 08:31

## 2023-03-21 RX ADMIN — Medication 1 APPLICATION.: at 08:47

## 2023-03-21 RX ADMIN — HYDROXYCHLOROQUINE SULFATE 200 MG: 200 TABLET ORAL at 06:33

## 2023-03-21 RX ADMIN — MELATONIN 3 MG: at 20:49

## 2023-03-21 RX ADMIN — DULOXETINE HYDROCHLORIDE 60 MG: 60 CAPSULE, DELAYED RELEASE ORAL at 20:49

## 2023-03-21 RX ADMIN — METOPROLOL SUCCINATE 25 MG: 25 TABLET, EXTENDED RELEASE ORAL at 17:26

## 2023-03-21 RX ADMIN — MORPHINE SULFATE 7.5 MG: 15 TABLET ORAL at 05:08

## 2023-03-21 RX ADMIN — LORAZEPAM 0.25 MG: 0.5 TABLET ORAL at 22:15

## 2023-03-21 RX ADMIN — MORPHINE SULFATE 7.5 MG: 15 TABLET ORAL at 20:23

## 2023-03-21 RX ADMIN — APIXABAN 5 MG: 5 TABLET, FILM COATED ORAL at 17:26

## 2023-03-21 NOTE — PROGRESS NOTES
Internal Medicine Progress Note  Patient: Yamil Wiggins  Age/sex: 75 y o  female  Medical Record #: [id number]      ASSESSMENT/PLAN: (Interval History)  Yamil Wiggins is seen and examined and management for following issues:    Recent right MCA infarct W/petechial hemorrhage  • S/P thrombectomy by Dr Wilton Connors 2/12  • Continue Eliquis 5 mg twice daily/statin   • Follow-up with neurosurgery/neurology as scheduled     NICM  • EF has been as low as 10 to 15% in the past  • In 2022 was 65%  • Now down to 30% = pt declined LifeVest  • Cardiology felt this was secondary to her recent CVA as well as possible medication noncompliance  • They recommend continuing beta-blocker and adding ARB when BP allows  • Will need outpatient ischemia work-up  • Followed by Dr Royal Edwards at [facility] ([place], [place]) however would like to follow with [facility] on dc  • Can add arb if bp improves     Seronegative rheumatoid arthritis  • A/T rheumatology takes Plaquenil 200 mg Monday through Friday 400 mg on Saturday and Sunday  • Continue same  • Follow-up on discharge     Hypercalcemia  · Seen by renal/endocrine on acute side  · Started on Sensipar and now down to 8 8  · Endocrine input appreciated  Will need outpt follow-up and potentially a surgical evaluation      Fall  · 3/6/23 onto left side  · Abrasion on left knee and Xray negative for fracture  · CT and Xrays negative     R great toe gout  · S/p prednisone  · Uric acid normal  · improved     Chronic pain  • Due to compression fractures of her thoracic spine/chronic rib fractures  • Lethargic 3/7 and MS contin changed to IR  • CONT OFF MS CONTIN  • Generally takes about 1-2 doses daily  • Pain management per PMR  • Pain stable     Bipolar disorder  • Continue current medication regimen  • Follow-up with psychiatry on discharge  • Neuropsych following    Anxiety  · Moods are stable    Confusion  · Improved  · Off continuous observation    SHAR  • Does not use CPAP at home d/t claustraphobia  • OK use oxygen overnight     Dysuria/Urinary frequency/incontinence  • Urine culture +CRE  • s/p vantin 200mg bid through 3/9/23  • resolved    Hypokalemia  · stable      DC planning: 3/22/23=await SNF    The above assessment and plan was reviewed and updated as determined by my evaluation of the patient on 3/21/2023      Labs:   Results from last 7 days   Lab Units 03/20/23  0558   WBC Thousand/uL 7 43   HEMOGLOBIN g/dL 12 4   HEMATOCRIT % 39 0   PLATELETS Thousands/uL 213     Results from last 7 days   Lab Units 03/20/23  0558 03/16/23  0705   SODIUM mmol/L 143 141   POTASSIUM mmol/L 3 7 3 8   CHLORIDE mmol/L 111* 112*   CO2 mmol/L 28 28   BUN mg/dL 13 16   CREATININE mg/dL 0 82 0 91   CALCIUM mg/dL 8 8 9 1                   Review of Scheduled Meds:  Current Facility-Administered Medications   Medication Dose Route Frequency Provider Last Rate   • acetaminophen  650 mg Oral Q6H PRN Anel Arrieta, DO     • albuterol  2 puff Inhalation Q6H PRN Anel Arrieta DO     • aluminum-magnesium hydroxide-simethicone  30 mL Oral Q4H PRN Anel Arrieta, DO     • apixaban  5 mg Oral BID Anel Arrieta DO     • atorvastatin  40 mg Oral QPM Anel Arrieta DO     • bisacodyl  10 mg Rectal Once Stefan Zelaya MD     • buPROPion  100 mg Oral BID Anel Arrieta, DO     • cinacalcet  30 mg Oral Daily With Breakfast Anika Flores MD     • DULoxetine  60 mg Oral BID Anel Arrieta DO     • gabapentin  100 mg Oral BID Stefan Zelaya MD     • glycerin-hypromellose-  1 drop Both Eyes Q4H PRN Anel Arrieta, DO     • hydroxychloroquine  200 mg Oral Once per day on Mon Tue Wed Thu Fri Anel Arrieta DO     • hydroxychloroquine  400 mg Oral Once per day on Sun Sat Anel Arrieta DO     • lamoTRIgine  150 mg Oral HS Anel Arrieta, DO     • lidocaine  1 patch Topical Daily Winnie Genao MD     • LORazepam  0 25 mg Oral BID PRN Stefan Zelaya MD     • melatonin  3 mg Oral HS LAURA Luke • menthol-methyl salicylate   Apply externally 4x Daily PRN Ed MD Katherine     • metoprolol succinate  25 mg Oral BID LAURA Glynn     • morphine  7 5 mg Oral Q6H PRN Antoni Varghese MD     • ondansetron  4 mg Oral Q6H PRN Juan Manuel Credit, DO     • pantoprazole  40 mg Oral Early Morning Iesha Trujillo MD     • polyethylene glycol  17 g Oral Daily PRN Juan Manuel Credit, DO     • prazosin  1 mg Oral HS Juan Manuel Credit, DO     • senna-docusate sodium  1 tablet Oral BID Juan Manuel Credit, DO     • sodium chloride  1 spray Each Nare Q1H PRN Yomaira Romero MD     • white petrolatum-mineral oil   Topical TID Yomaira Romero MD         Subjective/ HPI: Patient seen and examined  Patients overnight issues or events were reviewed with nursing or staff during rounds or morning huddle session  New or overnight issues include the following:     Pt seen in her room  NO overnight issues or concerns  She is doing well in therapy  No further cognitive issues noted  ROS:   A 10 point ROS was performed; negative except as noted above  Imaging:     FL barium swallow video w speech   Final Result by [employer]ATED, DOCUMENTATION (03/09 1306)      FL barium swallow video w speech   Final Result by  (03/09 1550)      XR hip/pelv 2-3 vws left if performed   Final Result by Brie Hancock MD (03/08 1633)      No acute osseous abnormality  Workstation performed: [id number]         XR shoulder 2+ vw left   Final Result by Brie Hancock MD (03/08 163)      No acute osseous abnormality  Workstation performed: [id number]         XR spine cervical 2 or 3 vw injury   Final Result by Brie Hancock MD (03/08 1646)      No acute osseous abnormality or significant change from priors  Salt-and-pepper appearance of the skull is partially accentuated by the image processing /sharpening algorithm, though prior head CT suggests this is a true finding    Correlate for clinical/laboratory features of hyperparathyroidism  Workstation performed: [id number]         XR chest portable   Final Result by Myke Hobson MD (03/07 1650)      No acute cardiopulmonary disease  Workstation performed: [id number]         CT head wo contrast   Final Result by Ector Carpenter MD (03/07 2548)      Evolving right MCA territory infarcts with associated petechial hemorrhage which is best seen on the recent MRI examination  There is no discrete parenchymal hematoma  Workstation performed: [id number]         XR knee 1 or 2 vw left   Final Result by Myke Hobson MD (03/07 1770)      No acute osseous abnormality  Workstation performed: [id number]             *Labs /Radiology studies reviewed  *Medications reviewed and reconciled as needed  *Please refer to order section for additional ordered labs studies  *Case discussed with primary attending during morning huddle case rounds    Physical Examination:  Vitals:   Vitals:    03/20/23 1811 03/20/23 2036 03/21/23 0356 03/21/23 0830   BP: 94/63 95/64 97/56 92/52   BP Location: Right arm Right arm Right arm    Pulse: 90 90 85 87   Resp:  18 18    Temp:  97 7 °F (36 5 °C) 98 °F (36 7 °C)    TempSrc:  Oral Axillary    SpO2:  96% 94%    Weight:       Height:           GEN: No apparent distress, frail  NEURO: Alert and oriented x3; pleasant  HEENT: Pupils are equal and reactive, EOMI, mucous membranes are moist, face symmetrical  CV: S1 S2 regular, no MRG, no peripheral edema noted  RESP: Lungs are clear bilaterally, no wheezes, rales or rhonchi noted, on room air, respirations easy and non labored  GI: Flat, soft non tender, non distended; +BS x4  : Voiding without difficulty  MUSC: Moves all extremities  SKIN: skin is pale, poor turgor, scattered ecchymosis on b/l upper extremities   hands are dry with scratch marks      The above physical exam was reviewed and updated as determined by my evaluation of the patient on 3/21/2023  Invasive Devices     None                    VTE Pharmacologic Prophylaxis: Eliquis  Code Status: Level 1 - Full Code  Current Length of Stay: 21 day(s)      Total time spent:  30 minutes  with more than 50% spent counseling/coordinating care  Counseling includes discussion with patient re: progress  and discussion with patient of his/her current medical state/information  Coordination of patient's care was performed in conjunction with primary service  Time invested included review of patient's labs, vitals, and management of their comorbidities with continued monitoring  In addition, this patient was discussed with medical team including physician and advanced extenders  The care of the patient was extensively discussed and appropriate treatment plan was formulated unique for this patient  Medical decision making for the day was made by supervising physician unless otherwise noted in their attestation statement  ** Please Note:  voice to text software may have been used in the creation of this document   Although proof errors in transcription or interpretation are a potential of such software**

## 2023-03-21 NOTE — TEAM CONFERENCE
Acute RehabilitationTe Conference Note  Date: 3/21/2023   Time: 10:46 AM       Patient Name:  Leonel Carrillo       Medical Record Number: [id number]   YOB: 1955  Sex: Female          Room/Bed:  [facility] 453/[facility] 453-01  Payor Info:  Payor: MEDICARE / Plan: MEDICARE A AND B / Product Type: Medicare A & B Fee for Service /      Admitting Diagnosis: Acute right MCA stroke ([facility] ) [I63 511]  CVA (cerebral vascular accident) ([place] [address] ) [I63 9]   Admit Date/Time:  2/28/2023  3:41 PM  Admission Comments: No comment available     Primary Diagnosis:  Acute CVA (cerebrovascular accident) ([place] [address] )  Principal Problem: Acute CVA (cerebrovascular accident) [facility])    Patient Active Problem List    Diagnosis Date Noted   • Hypokalemia 03/10/2023   • AMS (altered mental status) 03/09/2023   • Gout of foot 03/09/2023   • Dysphagia 03/09/2023   • Wound of left ankle 03/09/2023   • UTI (urinary tract infection) 03/06/2023   • Obstructive sleep apnea 02/28/2023   • History of stroke 02/28/2023   • History of bacteremia 02/24/2023   • Acute CVA (cerebrovascular accident) ([place] [address] ) 02/23/2023   • Hypercalcemia 02/22/2023   • Discoloration of skin of foot 02/22/2023   • Abdominal pain 02/22/2023   • HFrEF (heart failure with reduced ejection fraction) ([facility] ) 02/13/2023   • Stroke ([place] [address] ) 02/12/2023   • HTN (hypertension) 02/12/2023   • Cardiomyopathy ([facility] ) 02/07/2023   • Vitamin D insufficiency 02/07/2023   • LAILA (acute kidney injury) ([facility] ) 04/23/2022   • Polypharmacy 04/23/2022   • 'light-for-dates' infant with signs of fetal malnutrition 04/22/2022   • Bipolar depression ([facility] ) 03/04/2022   • Rheumatoid arthritis ([facility] ) 03/04/2022   • Closed fracture of multiple ribs of right side 03/02/2022   • Traumatic pneumothorax 03/02/2022   • Closed fracture of transverse process of lumbar vertebra ([facility] ) 03/02/2022   • Liver contusion 03/02/2022   • Fall 03/02/2022   • Acute pain due to trauma 03/02/2022   • Right shoulder pain 03/02/2022   • Severe protein-calorie malnutrition ([facility] ) 03/02/2022   • Undifferentiated connective tissue disease ([facility] ) 12/29/2021   • Primary generalized (osteo)arthritis 12/29/2021   • Peripheral neuropathy 12/29/2021   • Osteoporosis 12/29/2021   • Hx of compression fracture of spine 12/29/2021   • Lumbar spondylosis 12/29/2021   • Cervical spondylosis 12/29/2021   • Anemia 06/26/2021   • Chronic pain syndrome    • Asthma    • Ambulatory dysfunction    • Seronegative arthropathy of multiple sites (HCC)    • Gastroesophageal reflux disease without esophagitis    • Mixed stress and urge urinary incontinence    • CHF (congestive heart failure) ([facility] )        Physical Therapy:    Weight Bearing Status: Full Weight Bearing  Transfers: Incidental Touching, Minimal Assistance  Bed Mobility: Incidental Touching  Amulation Distance (ft): 100 feet  Ambulation: Incidental Touching  Assistive Device for Ambulation: Roller Walker  Wheelchair Mobility Distance: 100 ft  Wheelchair Mobility: Supervision  Number of Stairs: 6  Assistive Device for Stairs: Bilateral Hand Rails  Stair Assistance: Incidental Touching  Discharge Recommendations: Corbin Doss    Pt is demonstrating improving indep with w/c propulsion however still presents with inc risk for falls and inc caregiver burden with overall functional mobilities when using a RW due to L hemiparesis with impaired proprioception, dec dynamic balance and righting reactions, dec act tolerance and L side inattention and dec cognition  Although pt is also able to mobilize household distances without AD but tolerance limited by back pain and safety limited by aforementioned above deficits  Pt is now off to 1:1 with improving alertness and appropriateness when calling for staff assistance utilizing call bell  Pt will benefit from additional skilled PT intervention to facilitate motor and functional gains s/p CVA  Recommend SNF placement at this time to continue rehab and functional recovery  Occupational Therapy:  Eating: Supervision  Grooming: Minimal Assistance  Bathing: Moderate Assistance  Bathing: Moderate Assistance  Upper Body Dressing: Moderate Assistance  Lower Body Dressing: Moderate Assistance  Toileting: Moderate Assistance  Tub/Shower Transfer: Moderate Assistance  Toilet Transfer: Minimal Assistance  Cognition: Exceptions to WNL  Cognition: Decreased Memory, Decreased Executive Functions, Decreased Attention, Decreased Safety  Orientation: Person, Place, Time, Situation  Discharge Recommendations: Other (EDIWN)  DC Home with[de-identified] 24 Hour Assistance       Pt continues to present with impairments in activity tolerance, endurance, standing balance/tolerance, sitting balance/tolerance, UE strength, FMC, GMC, arousal, memory, insight, safety , judgement , attention , sequencing , visual perceptual skills , depth perception , and (L) attention   Additional functional barriers include fatigue, pain, (L) hemiparesis, (L) visual deficits , decreased caregiver support, risk for falls, and home environment  Pt is functioning at overall Min-Mod A for ADLs and Min Ax1 w/ RW for fxnl mobility  Pt will continue to benefit from skilled OT services to address above mentioned barriers and maximize functional independence in baseline areas of occupation  OT D/C recommendation is for EDWIN               Speech Therapy:  Mode of Communication: Verbal  Speech/Language: Dysarthia  Cognition: Exceptions to WNL  Cognition: Decreased Memory, Decreased Executive Functions, Decreased Attention, Decreased Comprehension, Decreased Safety  Orientation: Person, Place, Time, Situation  Swallowing: Exceptions to WNL  Swallowing: Oral Dysphagia, Pharyngeal Dysphagia, Aspiration Risk  Diet Recommendations: Level 1/Puree, Jolly Thick  Discharge Recommendations:  (pending progress)  DC Home with[de-identified] 24 Hour Supervision, 24 Hour Assisteance, Family Support, Home Speech Therapy, Outpatient Speech Therapy  Pt currently being followed for speech, cognitive and dysphagia tx sessions  In regards to dysphagia, pt is demonstrating moderate oropharyngeal deficits to where current diet recommendations are for puree/thin liquids by cup only  Symptoms or concerns included decreased bolus acceptance, decreased bolus propulsion, decreased mastication, decreased bolus formation, suspected decreased control of soft solids and thin   and unintentional oral holding w/ soft solids  suspected pharyngeal swallow delay, suspected decreased hyolaryngeal elevation upon palpation, suspected pharyngeal residue, multiple swallows, audible swallows and throat clearing w/ solids, coughing w/ sips of thin and soft solids  Pt has been requesting liquids by straw, but due to decreased bolus control/timing of swallow initation and the increased aspiration sxs which are elicited given straw sips, still recommendations remain for no straws currently  It is likely that a repeat VFSS to determine current swallow function and least restrictive diet  As for speech, pt able to complete Motor Speech Eval, in which pt is demonstrating speech intelligibility to be moderately impaired at the word/phrase/sentence/conversational level  Pt noting to elicit the following errors: imprecise articulation, decreased breath support for speech, fast rate which impacts overall clarity at the word, phrase, sentence and conversational speech level  Pt w/ inconsistent awareness given this throughout assessment today  Additionally, pt has been working towards completing the CLQT+ as well targeting cognitive linguistic skills, to where pt is noted to exhibit deficits given attention, visual attention (L inattention), decreased ST/working memory, decreased executive functions (problem solving, reasoning, sequencing, organization of thoughts), decreased judgement and insight to overall deficits impacting safety and functional mobility   Currently pt will continue to benefit from ongoing skilled SLP services to maximize overall speech, cognitive and swallow function in establishing least restrictive diet in attempts to decrease caregiver burden at time of anticipated discharge home w/ family support/supervision  Update week of 3/13/2023: Pt continues to be followed for skilled SLP therapy targeting cognitive linguistic skills, speech skills and dysphagia  Regarding cognitive linguistic and speech skills, pt further presents with overall deficits, noting skills to frequently fluctuate  Pt remains with deficits and barriers in the following areas: attention, ST/working memory, processing, comprehension, expression, basic problem solving, executive functions, insight into deficits, organization of thoughts, sequencing, visualspatial skills/visual attention (L inattention) and impulsivity  Due to the above deficits, pt's overall functional independence and safety are impacted at this time  Pt also presents with fluctuating RHIANNON and times of increased lethargy, along with occasional hallucinations and confabulations, which impact independent functioning and safety  Regarding speech skills, pt demonstrates times of improvement but does continue to present with overall moderately dysarthric speech characterized by decreased breath support for speech, fast rate of speech, low volume and imprecise articulation of sounds, which impacts speech intelligibility at all levels of speech  Currently when fully awake and with increased alertness, pt is functioning at min assist for comprehension and mod assist for expression, problem solving and memory  Additionally, pt continues to present with oropharyngeal dysphagia and was recommended for completion of a VFSS to further assess swallow function as pt presenting with inconsistent overt s/s aspiration   VFSS completed on 3/9/2023 where pt demonstrating moderate to severe oropharyngeal dysphagia characterized by weak lip seal, anterior loss, decreased control of boluses, slow bolus manipulation, prolonged mastication, reduced bolus formation, delayed transfers, weak BOT retraction, reduced hyolaryngeal excursion, delayed swallow initiation, weak pharyngeal constriction which resulted in pharyngeal retention, and reduced airway protection  Pt exhibited laryngeal penetration across liquids, along with silent aspiration events with both thin liquids, as well as nectar thick taken by straw sips  Pt was then recommended to continue puree diet with change to nectar thick liquids by cup only  As pt demo limited independent carryover of recommended safe swallow strategies, pt is recommended to continue to have full supervision during meals to ensure carryover of these strategies  Pt's family was educated on results of VFSS, along with current diet recommendations  At this time, pt is recommended for further skilled SLP services to maximize overall speech, cognitive and swallow function in establishing least restrictive diet in attempts to decrease caregiver burden at time of anticipated discharge home with family support/supervision  Update from week of 3/20/2023: Pt conts to be followed for skilled SLP services targeting swallow and cognitive skills  In regards to swallow:  Pt conts with mild oral and mild- moderate pharyngeal dysphagia  Symptoms or concerns included decreased bolus propulsion, decreased bolus formation, delayed oral intiation, suspected decreased control of larger sips of NTL and ice chips , piecemeal deglutition, suspected pharyngeal swallow delay, suspected decreased hyolaryngeal elevation upon palpation, suspected pharyngeal residue, multiple swallows, effortful swallow, audible swallows and strong cough response post weak swallow with ice chips  Plan to cont to encourage and reinforce safe swallow strategies for increased independence with meals while trialing upgraded diet items of preference as able as appropriate   In regards to cognition: Pt conts to make progress towards cognitive goals but conts to fluctuate pending fatigue  Pt conts with deficits in ST/working memory, planning, reasoning, problem solving, insight, and executive functioning skills  Regarding speech skills, this appears to be improving but fluctuates as well pending attention to strategies and fatigue levels  Pt currently functioning at min A problem solving, memory, comprehension and expression  Pt will benefit from further skilled SLP services targeting speech and swallow skills to maximize overall function for increased independence and decreased burden of care upon safe discharge to next level of care  Recommendations would be for 24hr S/A at this time  Nursing Notes:  Appetite: Fair  Diet Type: Dysphagia I, Nectar thick liquids                      Diet Patient/Family Education Complete: No    Type of Wound (LDA):  Wound                    Type of Wound Patient/Family Education: Yes  Bladder: Incontinent     Bladder Patient/Family Education: Yes  Bowel: Continent     Bowel Patient/Family Education: Yes  Pain Location/Orientation: Orientation: Bilateral, Orientation: Lower, Location: Back  Pain Score: 3  Pain 2  Pain Score 2: 6  Pain Location/Orientation 2: Location: Back (during gait training without AD)  Pain Radiating Towards 2: no  Patient's Stated Pain Goal 2: No pain  Hospital Pain Intervention(s) 2: Rest                    Hospital Pain Intervention(s): Medication (See MAR), Rest, Repositioned  Pain Patient/Family Education: Yes  Medication Management/Safety  Safe Administration: No  Reason for non-safe administration: will need med assist at d/c  Medication Patient/Family Education Complete: No    Pt admitted w decline in LOC from [facility] s/p Recent right MCA infarct W/petechial hemorrhage S/P thrombectomy by Dr Mcdonnell Ranks 2/12 Continue Eliquis 5 mg twice daily/statin Follow-up with neurosurgery/neurology as scheduled NICM- EF has been as low as 10 to 15% in the past In 2022 was 65% Now down to 30% = pt declined [address] [facility] felt this was secondary to her recent CVA as well as possible medication noncompliance They recommend continuing beta-blocker and adding ARB when BP allows  Will need outpatient ischemia work-up Have not been able to add back ARB yet 2/2 soft BPs and cant go down on Toprol dosing since HRs go into the low 100s at times Seronegative rheumatoid arthritis A/T rheumatology takes Plaquenil 200 mg Monday through Friday 400 mg on Saturday and Sunday Continue same Follow-up on discharge  Chronic pain- Due to compression fractures of her thoracic spine/chronic rib fractures Takes MS Contin 15 mg every 12 hours Has Oxycodone 5 mg immediate release for breakthrough pain at home  According to the patient she takes only 1 dose of breakthrough oxycodone per day  Here using tylenol for breakthrough  Bipolar disorder Continue current medication regimen SHAR- Refusing CPAP here  OK use oxygen overnight Tachycardia/low O2 on pulse ox- Some irregularity on exam in past but today RRR  EKG with PVCs  During the week, O2 sats improved when hands were warmed up  O2 sats not likely to be accurate due to circulation issues  Dysuria/Urinary frequency/incontinence- Urine culture +CRE PMR started IV Cipro 400mg every 12 hours for 5 days  Switched to po  Pt w fall 3/7- found on floor  1:1 watch ordered  Pt  incontinent of bowel and bladder at times  This week we will encourage independence with ADLs  We will monitor labs and vital signs  We will educate pt  about repositioning to prevent skin breakdown  We will perform routine skin checks  We will monitor for constipation and medicate as ordered  We will monitor for adequate pain control  We will increase safety awareness with transfers and keep pt free from falls                     Case Management:     Discharge Planning  Living Arrangements: Lives w/ Children, Lives w/ Family members  Support Systems: Self  Assistance Needed: walker  Type of Current Residence: Private residence  [address]: No  3/20- Cm coordination SNF placement next week, cm to send referrals  Is the patient actively participating in therapies? yes  List any modifications to the treatment plan: None    Barriers Interventions   Altered mental status Med management, 1:1, improved   UTI Resolved   Dysphagia SLP Services   Wound on left heel Wound care following   Left inattention- decreased cog, impaired righting reaction Thera ex, neuro lyndon, w/ and w/o AD   Left visual field cut Compensatory visual scanning    Impaired ST memory, limits carry over of safety Repetitive training, functional cog retraining   LUE strengthening Neuro lyndon   Oral farengal dysphagia Pured diet, nectar thick liquid   Dysarthria SLp      Is the patient making expected progress toward goals? yes  List any update or changes to goals: None    Medical Goals: Patient will be medically stable for discharge to [facility] upon completion of rehab program and Patient will be able to manage medical conditions and comorbid conditions with medications and follow up upon completion of rehab program    Weekly Team Goals:   Rehab Team Goals  ADL Team Goal: Patient will require supervision with ADLs with least restrictive device upon completion of rehab program  Transfer Team Goal: Patient will require supervision with transfers with least restrictive device upon completion of rehab program (S)  Locomotion Team Goal: Patient will require supervision with locomotion with least restrictive device upon completion of rehab program (S)  Cognitive Team Goal: Patient will require supervision for basic and complex tasks upon completion of rehab program    Discussion: Pt participating in therapies and rehab program  Pt min assist for expression and comprehension  Pt min/ cg for ambulation, supervision with w/c  Min/mod assist for ADL IADLs   Team recommending transition to subacute, cm sent SNF referrals  Anticipated Discharge Date:  Dc pending SNF referrals   [facility] Team Members Present: The following team members are supervising care for this patient and were present during this Weekly Team Conference      Physician: Dr Santiago Kirkland DO  : MING Rock  Registered Nurse: Debbie Duckworth, JULISA  Physical Therapist: Melissa Ch DPT  Occupational Therapist: Charles Farah MS, OTR/L  Speech Therapist: Lacho Cortez, [place], [address] [place] [address]

## 2023-03-21 NOTE — PLAN OF CARE
Reviewed    Problem: PAIN - ADULT  Goal: Verbalizes/displays adequate comfort level or baseline comfort level  Description: Interventions:  - Encourage patient to monitor pain and request assistance  - Assess pain using appropriate pain scale  - Administer analgesics based on type and severity of pain and evaluate response  - Implement non-pharmacological measures as appropriate and evaluate response  - Consider cultural and social influences on pain and pain management  - Notify physician/advanced practitioner if interventions unsuccessful or patient reports new pain  3/20/2023 2321 by Brittaney Weems RN  Outcome: Progressing  3/20/2023 2321 by Brittaney Weems RN  Outcome: Progressing     Problem: INFECTION - ADULT  Goal: Absence or prevention of progression during hospitalization  Description: INTERVENTIONS:  - Assess and monitor for signs and symptoms of infection  - Monitor lab/diagnostic results  - Monitor all insertion sites, i e  indwelling lines, tubes, and drains  - Monitor endotracheal if appropriate and nasal secretions for changes in amount and color  - [place] appropriate cooling/warming therapies per order  - Administer medications as ordered  - Instruct and encourage patient and family to use good hand hygiene technique  - Identify and instruct in appropriate isolation precautions for identified infection/condition  3/20/2023 2321 by Brittaney Weems RN  Outcome: Progressing  3/20/2023 2321 by Brittaney Weems RN  Outcome: Progressing  Goal: Absence of fever/infection during neutropenic period  Description: INTERVENTIONS:  - Monitor WBC    3/20/2023 2321 by Brittaney Weems RN  Outcome: Progressing  3/20/2023 2321 by Brittaney Weems RN  Outcome: Progressing     Problem: SAFETY ADULT  Goal: Patient will remain free of falls  Description: INTERVENTIONS:  - Educate patient/family on patient safety including physical limitations  - Instruct patient to call for assistance with activity   - Consult OT/PT to assist with strengthening/mobility   - Keep Call bell within reach  - Keep bed low and locked with side rails adjusted as appropriate  - Keep care items and personal belongings within reach  - Initiate and maintain comfort rounds  - Make Fall Risk Sign visible to staff  - Offer Toileting every 4 Hours, in advance of need  - Initiate/Maintain bed and chair alarm  - Apply yellow socks and bracelet for high fall risk patients  - Consider moving patient to room near nurses station  3/20/2023 2321 by Bala Camejo RN  Outcome: Progressing  3/20/2023 2321 by Bala Camejo RN  Outcome: Progressing  Goal: Maintain or return to baseline ADL function  Description: INTERVENTIONS:  -  Assess patient's ability to carry out ADLs; assess patient's baseline for ADL function and identify physical deficits which impact ability to perform ADLs (bathing, care of mouth/teeth, toileting, grooming, dressing, etc )  - Assess/evaluate cause of self-care deficits   - Assess range of motion  - Assess patient's mobility; develop plan if impaired  - Assess patient's need for assistive devices and provide as appropriate  - Encourage maximum independence but intervene and supervise when necessary  - Involve family in performance of ADLs  - Assess for home care needs following discharge   - Consider OT consult to assist with ADL evaluation and planning for discharge  - Provide patient education as appropriate  3/20/2023 2321 by Bala Camejo RN  Outcome: Progressing  3/20/2023 2321 by Bala Camejo RN  Outcome: Progressing  Goal: Maintains/Returns to pre admission functional level  Description: INTERVENTIONS:  - Set and communicate daily mobility goal to care team and patient/family/caregiver     - Collaborate with rehabilitation services on mobility goals if consulted  - Out of bed for toileting  - Record patient progress and toleration of activity level   3/20/2023 2321 by Bala Camejo RN  Outcome: Progressing  3/20/2023 2321 by Bala Camejo RN  Outcome: Progressing     Problem: DISCHARGE PLANNING  Goal: Discharge to home or other facility with appropriate resources  Description: INTERVENTIONS:  - Identify barriers to discharge w/patient and caregiver  - Arrange for needed discharge resources and transportation as appropriate  - Identify discharge learning needs (meds, wound care, etc )  - Arrange for interpretive services to assist at discharge as needed  - Refer to Case Management Department for coordinating discharge planning if the patient needs post-hospital services based on physician/advanced practitioner order or complex needs related to functional status, cognitive ability, or social support system  3/20/2023 2321 by Jodie Cruz RN  Outcome: Progressing  3/20/2023 2321 by Jodie Cruz RN  Outcome: Progressing     Problem: Nutrition/Hydration-ADULT  Goal: Nutrient/Hydration intake appropriate for improving, restoring or maintaining nutritional needs  Description: Monitor and assess patient's nutrition/hydration status for malnutrition  Collaborate with interdisciplinary team and initiate plan and interventions as ordered  Monitor patient's weight and dietary intake as ordered or per policy  Utilize nutrition screening tool and intervene as necessary  Determine patient's food preferences and provide high-protein, high-caloric foods as appropriate       INTERVENTIONS:  - Monitor oral intake, urinary output, labs, and treatment plans  - Assess nutrition and hydration status and recommend course of action  - Evaluate amount of meals eaten  - Assist patient with eating if necessary   - Allow adequate time for meals  - Recommend/ encourage appropriate diets, oral nutritional supplements, and vitamin/mineral supplements  - Order, calculate, and assess calorie counts as needed  - Recommend, monitor, and adjust tube feedings and TPN/PPN based on assessed needs  - Assess need for intravenous fluids  - Provide specific nutrition/hydration education as appropriate  - Include patient/family/caregiver in decisions related to nutrition  3/20/2023 2321 by Zainab Herrera RN  Outcome: Progressing  3/20/2023 2321 by Zainab Herrera RN  Outcome: Progressing     Problem: Prexisting or High Potential for Compromised Skin Integrity  Goal: Skin integrity is maintained or improved  Description: INTERVENTIONS:  - Identify patients at risk for skin breakdown  - Assess and monitor skin integrity  - Assess and monitor nutrition and hydration status  - Monitor labs   - Assess for incontinence   - Turn and reposition patient  - Assist with mobility/ambulation  - Relieve pressure over bony prominences  - Avoid friction and shearing  - Provide appropriate hygiene as needed including keeping skin clean and dry  - Evaluate need for skin moisturizer/barrier cream  - Collaborate with interdisciplinary team   - Patient/family teaching  - Consider wound care consult   3/20/2023 2321 by Zainab Herrera RN  Outcome: Progressing  3/20/2023 2321 by Zainab Herrera RN  Outcome: Progressing     Problem: MOBILITY - ADULT  Goal: Maintain or return to baseline ADL function  Description: INTERVENTIONS:  -  Assess patient's ability to carry out ADLs; assess patient's baseline for ADL function and identify physical deficits which impact ability to perform ADLs (bathing, care of mouth/teeth, toileting, grooming, dressing, etc )  - Assess/evaluate cause of self-care deficits   - Assess range of motion  - Assess patient's mobility; develop plan if impaired  - Assess patient's need for assistive devices and provide as appropriate  - Encourage maximum independence but intervene and supervise when necessary  - Involve family in performance of ADLs  - Assess for home care needs following discharge   - Consider OT consult to assist with ADL evaluation and planning for discharge  - Provide patient education as appropriate  3/20/2023 2321 by Zainab Herrera RN  Outcome: Progressing  3/20/2023 2321 by Zainab Herrera RN  Outcome: Progressing  Goal: Maintains/Returns to pre admission functional level  Description: INTERVENTIONS:  - Perform BMAT or MOVE assessment daily    - Set and communicate daily mobility goal to care team and patient/family/caregiver     - Collaborate with rehabilitation services on mobility goals if consulted  - Out of bed for toileting  - Record patient progress and toleration of activity level   Outcome: Progressing

## 2023-03-21 NOTE — PROGRESS NOTES
03/21/23 1130   Pain Assessment   Pain Assessment Tool 0-10   Pain Score 3   Pain Location/Orientation Orientation: Bilateral;Orientation: Lower; Location: Back   Hospital Pain Intervention(s) Rest   Restrictions/Precautions   Precautions Aspiration;Bed/chair alarms;Cognitive; Fall Risk;Contact/isolation;Supervision on toilet/commode;Visual deficit   Comprehension   Comprehension (FIM) 4 - Understands basic info/conversation 75-90% of time   Expression   Expression (FIM) 4 - Needs to repeat single words   Social Interaction   Social Interaction (FIM) 5 - Interacts appropriately with others 90% of time   Problem Solving   Problem solving (FIM) 3 - Solves basic problmes 50-74% of time   Memory   Memory (FIM) 4 - Recognizes/recalls/performs 75-89%   Speech/Language/Cognition Assessment   Treatment Assessment Pt participated in skilled ST session focusing on cognitive linguistic skills  During this time, pt asked various questions regarding her progress throughout rehab stay, as well as what to expect after discharge  SLP provided a review of pt's progress since admission with improvements occurring in overall cognition, speech intelligibility, recall and swallow function with certain items  Pt appeared with understanding  Regarding what to expect after discharge, SLP reviewed that pt is recommended to continue skilled SLP services at next level of subacute rehab and will also likely be recommended for a repeat VFSS in the future to re-assess swallow function as goal remains to achieve a less restrictive diet  This also lead to a discussion regarding the network stroke support group that is available to which SLP then provided additional time about this topic in relation to frequency of meetings, time, topics, location, etc  Pt reports that she is currently not sure whether she would like to participate at this time-SLP brought her a flyer for her to reference in the future if needed   During a structured task, pt completed a higher level word deduction task which also required skills of comparing/contrasting  Pt independently determined described words for 14/16 trials, noting occasional need for increased time  Provided with min to moderate verbal cues, pt improved to determine remaining two target words  In compare and contrast portion of task, pt was 8/8 accurate, but did benefit from verbal cues for continuation of task as pt with times of decreased attention and delayed processing  Throughout pt's meal, pt observed to have times of tangential speech and decreased task continuation, aside from pt's use of rest breaks  Pt also remained with decreased attention and awareness of her LUE during meal as she often knocked over food/drink items or placed hand in items  However, pt did demo good recall of recent events and therapy routines, along with names and roles of staff members  At this time, pt is recommended for further skilled SLP services to maximize overall functional speech and cognitive linguistic skills in attempts to decrease caregiver burden over time  Eating   Type of Assistance Needed Set-up / clean-up;Supervision;Verbal cues; Incidental touching   Physical Assistance Level No physical assistance   Eating CARE Score 4   Swallow Assessment   Swallow Treatment Assessment Daily Dysphagia Tx Note      Patient Name: Benny Michael     UJPNO'[id number] Date: 3/21/2023        Current Risks for Dysphagia & Aspiration: Weak voicing; weak cough; dysarthria; general debilitation; new neuro event; brain injury; cognitive deficit; hx neurologic dx; positioning issues     Current Symptoms/Concerns: Cough; throat clear; with liquids; during meals; with pills; difficulty chewing; decreased oral intake; hx of dysphagia/aspiration     Current diet: puree/level 1 diet and nectar thick liquids      Premorbid diet::upon d/c from prior admission, pt appeared to be on dysphagia level 2/thin liquids upon prior admission for stroke initially; unknown prior to admission, pt rx being on softer foods as well as thickened liquids in the past        Positioning: upright in recliner          Items administered:  Consistencies Administered: nectar thick, puree, trials of dysphagia level 2 items  Materials administered included pureed mashed potatoes, pudding and scrambled eggs, dysphagia level 2 trials of diced carrots and banana, magic cup and NTL by cup     Total amount of meal consumed:   50% of puree items  50% of dysphagia level 2 trials  50% magic cup  ~180cc NTL by cup        Oral stage: moderate  Lip closure: complete but appears weak; effective bolus retrieval of all items  Anterior spillage: none  Manipulation: slow and weaker but functional this meal  Mastication: lengthy with carrots and banana, suspect incomplete breakdown of banana  Bolus formation: weaker, likely incomplete  Bolus control: suspect decreased posterior control with all consistencies  Transfer: effortful, some wincing noted  Oral residue: trace of level 2 items  Pocketing: trace to min amount on L side with carrots but cleared with liquids and time        Pharyngeal stage: moderate  Swallow promptness: suspected to remain delayed with inconsistent timing of delays; occasionally appeared prompt  Hyolaryngeal elevation: present to palpation  Wet voice: none   Throat clear: x1 with bite of banana  Cough: x1 with consecutive sips of NTL; x1 with magic cup  Secondary swallows: fairly consistent this meal across both food and drink items  Audible swallows: present with all swallows- more audible with liquids        Esophageal stage:  No overt s/s reported or observed today  Summary:     Pt is presenting with s/s suggestive of moderate oral and moderate pharyngeal dysphagia this session   Symptoms or concerns included slower manipulation of puree, lengthy and incomplete mastication of trials of level 2 items, slower and disorganized bolus formation, effortful transfers, min amounts of oral residual/L sided pocketing, suspected decreased control of items, suspected pharyngeal swallow delay, suspected decreased hyolaryngeal elevation upon palpation, suspected pharyngeal residue, use of multiple swallows, use of effortful swallows, presence of audible swallows and presence of cough and/or throat clearing with level 2 trials, along with NTL taken in consecutive sips  Pt eager to trial level 2 items as she appreciated the texture  Pt demonstrated overall improvement in pacing this session noting pt to take rest breaks throughout meal, which appeared to assist in overall tolerance of meal without feeling nauseous  However, pt was with faster rate of intake at beginning of meal with both food and drink items  After being provided with verbal cue to swallow before taking more food and another verbal cue to take one sip at a time, pt improved in slowing rate of intake for remainder of meal and did not require any additional cuing  Functional bolus retrieval with manipulation and mastication remaining slower and effortful, suspecting incomplete mastication of banana  Fair bolus formation and oral clearance with only min oral residual/L pocketing which pt cleared  Tranfers also appeared to fluctuate in timing, noting pt to wince at times  Pt reported mild sore throat which she feels she may have caused from placing the suction too deep in her mouth  SLP notified nursing  Swallow initiation also appeared to fluctuate in timing from suspected moderate delay to fairly at times, but still suspect likely decreased posterior control of boluses  Throat clear x1 with banana, cough x1 with magic cup and cough x1 when pt took consecutive sips of NTL  After taking only single sips of NTL, no further overt s/s aspiration  Currently, not yet appropriate for diet upgrade but plan to trial more level 2 textures in upcoming sessions        Recommendations:  Recommended Diet:  puree/level 1 diet and nectar thick liquids by CUP ONLY-NO STRAWS  Recommended Form of Medications: whole with puree   Aspiration precautions and compensatory swallowing strategies: upright posture, only feed when fully alert, slow rate of feeding, small bites/sips, no straws, effortful swallow, cough every 4-5 bites/sips, quiet environment (tv off, limit talking, door closed, etc ), alternating bites and sips, OOB for ALL meals, MUST be AWAKE/ALERT for meals  Oral care with suction w/ after ALL meals  Once oral care was completed, pt is allowed ICE CHIPS between meals  However, pt MUST be FULLY upright when consuming ice chips  FULL supervision w/ meals     Results reviewed with:  patient, nursing     Aspiration precautions posted  Reflux precautions-->Remain upright at least 30 min post meals     F/u ST tx: Pt is recommended for further skilled SLP services targeting dysphagia therapy in order to maximize oral and pharyngeal swallow skills, while safely supporting PO intake, as well as to improve independent carryover of safe swallow strategies  Plan: Continue puree diet and NTL by cup only            Increase trials of dysphagia level 2 items    SLP Therapy Minutes   SLP Time In 1130   SLP Time Out 1230   SLP Total Time (minutes) 60   SLP Mode of treatment - Individual (minutes) 60   SLP Mode of treatment - Concurrent (minutes) 0   SLP Mode of treatment - Group (minutes) 0   SLP Mode of treatment - Co-treat (minutes) 0   SLP Mode of Treatment - Total time(minutes) 60 minutes   SLP Cumulative Minutes 1005   Therapy Time missed   Time missed?  No

## 2023-03-21 NOTE — PROGRESS NOTES
"   03/21/23 0900   Pain Assessment   Pain Assessment Tool 0-10   Pain Score 5   Pain Location/Orientation Orientation: Lower; Location: Back   Hospital Pain Intervention(s) Ambulation/increased activity; Rest;Shower/Bath   Restrictions/Precautions   Precautions Aspiration;Bed/chair alarms;Cognitive; Fall Risk;Contact/isolation;Supervision on toilet/commode;Visual deficit   Lifestyle   Autonomy \"I feel bad that I need so much help\"   Oral Hygiene   Type of Assistance Needed Physical assistance   Physical Assistance Level 25% or less   Comment Min A in supported stance at sink   Oral Hygiene CARE Score 3   Grooming   Able To Comb/Brush Hair;Wash/Dry Hands; Wash/Dry Face;Brush/Clean Teeth   Limitation Noted In Neglect; Timeliness;Strength; Coordination   Findings Assist to open deodorant and to brush all sections of hair   Shower/Bathe Self   Type of Assistance Needed Physical assistance   Physical Assistance Level 25% or less   Comment Pt completed shower seated unsupported on shower bench  Pt able to wash 10/10 parts, moderate VC's for thoroughness and hand placement on grab bars for safety  Pt required A with buttocks for thoroughness  Upon initial stand to wash buttocks, pt had near LOB posteriorly, requiring Min A from therapist to correct  Shower/Bathe Self CARE Score 3   Bathing   Assessed Bath Style Shower   Able to [address] [place] No   Able to [employer] Temperature No   Able to Wash/Rinse/Dry (body part) Left Arm;Right Arm;L Upper Leg;R Upper Leg;L Lower Leg/Foot;R Lower Leg/Foot; Abdomen; Chest;Perineal Area; Buttocks   Limitations Noted in Balance; Coordination; Endurance;Neglect; Safety;Strength;Timeliness   Positioning Seated;Standing   Adaptive Equipment Tub Bench;Hand Held Shower   Tub/Shower Transfer   Limitations Noted In Balance; Coordination; Endurance; Safety   Assessed Shower   Findings VC for RW positioning   Upper Body Dressing   Type of Assistance Needed Physical assistance   Physical Assistance Level " 26%-50%   Comment pt donned long sleeve overhead shirt with min A to thread LUE, VC to thread up to shoulders, A to fully manage shirt down  Pt able to recall to don LUE first    Upper Body Dressing CARE Score 3   Lower Body Dressing   Type of Assistance Needed Physical assistance   Physical Assistance Level 25% or less   Comment Pt threaded B/L LE while seated unsupported  [place] assist provided to achieve grasp with LUE, pt able to maintain unassisted during CM up  Pt required Min A in stance for safety during CM up, able to pull up over L hip fully   Lower Body Dressing CARE Score 3   Putting On/Taking Off Footwear   Type of Assistance Needed Physical assistance   Physical Assistance Level 51%-75%   Comment Pt doffed/donned socks with overall min A  OT provided [place] assist to achieve lateral pinch on sock with LUE, pt able to thread and don sock with incidental touching due to heel bandage  Anticipating increased assist for shoes   Putting On/Taking Off Footwear CARE Score 2   Sit to Stand   Type of Assistance Needed Physical assistance   Physical Assistance Level 25% or less   Comment with RW, minimal cues for hand placement and RW positioning   Sit to Stand CARE Score 3   Bed Mobility   Additional Comments Pt seated in recliner pre and post session   Functional Standing Tolerance   Time 1 minute   Activity static stance during bathing   Cognition   Overall Cognitive Status Impaired   Arousal/Participation Alert; Cooperative   Attention Attends with cues to redirect   Orientation Level Oriented X4   Memory Decreased short term memory   Following Commands Follows one step commands with increased time or repetition   Activity Tolerance   Activity Tolerance Patient tolerated treatment well   Assessment   Treatment Assessment Pt participated in skilled OT session focusing on ADL re-training and standing balance  Pt tolerated session well, limited by pain and fatigue, seated rest breaks required to manage   Pt is demonstrating good progress toward goals, requiring overall minimal assist for dressing and bathing with moderate verbal cues  Please see above for details on ADL session  Pt demonstrated decreased safety with hand placement, requiring consistent cues to utilize grab bars, unilateral release on RW, and reach back for chairs  Pt had mild LOB posteriorly in stance in shower while washing buttock, requiring min A to correct  Pt cont to be limited by decreased functional cognition, balance, coordination, L inattention, L visual deficit, postural control/strength, pain, endurance/activity tolerance, and mobility  Pt will benefit from continued skilled OT to maximize independence and safety with ADLs and functional transfers  Continue POC with focus on: LUE NMR, RW management, fxnl standing tolerance, ADL retraining (especially toileting and clothing management)  From OT standpoint, continuing to recommend [facility]  Pt with all needs met, call bell in reach, seated on recliner with alarms engaged  Prognosis Fair   Problem List Decreased strength;Decreased range of motion;Decreased endurance; Impaired balance;Decreased mobility; Decreased coordination;Decreased cognition;Decreased safety awareness; Impaired sensation   Barriers to Discharge Decreased caregiver support; Inaccessible home environment   Plan   Treatment/Interventions ADL retraining;Functional transfer training; Therapeutic exercise; Endurance training;Cognitive reorientation;Patient/family training;Equipment eval/education; Compensatory technique education   Progress Progressing toward goals   Recommendation   OT Discharge Recommendation Post acute rehabilitation services   OT Therapy Minutes   OT Time In 0900   OT Time Out 1030   OT Total Time (minutes) 90   OT Mode of treatment - Individual (minutes) 90   OT Mode of treatment - Concurrent (minutes) 0   OT Mode of treatment - Group (minutes) 0   OT Mode of treatment - Co-treat (minutes) 0   OT Mode of Treatment - Total time(minutes) 90 minutes   OT Cumulative Minutes 1740   Therapy Time missed   Time missed?  No

## 2023-03-21 NOTE — PROGRESS NOTES
03/21/23 1230   Pain Assessment   Pain Assessment Tool 0-10   Pain Score No Pain   Restrictions/Precautions   Precautions Aspiration;Bed/chair alarms; Fall Risk;Cognitive;Visual deficit;Supervision on toilet/commode   Subjective   Subjective pt agreeable to perform skilled PT   Roll Left and Right   Type of Assistance Needed Supervision   Roll Left and Right CARE Score 4   Sit to Lying   Type of Assistance Needed Physical assistance   Physical Assistance Level 25% or less   Sit to Lying CARE Score 3   Lying to Sitting on Side of Bed   Type of Assistance Needed Set-up / clean-up   Lying to Sitting on Side of Bed CARE Score 5   Sit to Stand   Type of Assistance Needed Physical assistance   Physical Assistance Level 25% or less   Sit to Stand CARE Score 3   Bed-Chair Transfer   Type of Assistance Needed Physical assistance   Physical Assistance Level 25% or less   Chair/Bed-to-Chair Transfer CARE Score 3   Transfer Bed/Chair/Wheelchair   Adaptive Equipment Roller Walker   Car Transfer   Type of Assistance Needed Supervision   Car Transfer CARE Score 4   Walk 10 Feet   Type of Assistance Needed Incidental touching   Comment RW   Walk 10 Feet CARE Score 4   Walk 50 Feet with Two Turns   Type of Assistance Needed Incidental touching; Adaptive equipment   Comment RW   Walk 50 Feet with Two Turns CARE Score 4   Ambulation   Primary Mode of Locomotion Prior to Admission Walk   Distance Walked (feet) 50 ft  (x5)   Assist Device Roller Walker   Does the patient walk? 2  Yes   Wheel 50 Feet with Two Turns   Type of Assistance Needed Supervision   Wheel 50 Feet with Two Turns CARE Score 4   Wheel 150 Feet   Type of Assistance Needed Supervision   Wheel 150 Feet CARE Score 4   Wheelchair mobility   Does the patient use a wheelchair? 1  Yes   Type of Wheelchair Used 1  Manual   Method Right upper extremity; Left upper extremity   Distance Level Surface (feet) 200 ft   Curb or Single Stair   Style negotiated Curb   Type of Assistance Needed Physical assistance   Physical Assistance Level 25% or less   Comment RW 6 inch curb steps   1 Step (Curb) CARE Score 3   4 Steps   Type of Assistance Needed Incidental touching   Comment BHR   4 Steps CARE Score 4   Picking Up Object   Type of Assistance Needed Incidental touching;Verbal cues   Comment reacher   Picking Up Object CARE Score 4   Therapeutic Interventions   Strengthening LAQ AP marching 20 reps STS x5 X2 Modified STS   Flexibility HS  and Calfs manual stretch   Balance walking FWB and BWD w RW   Assessment   Treatment Assessment pt focus on mobility and care scores and overall walking up to 50 feet with RW two turns to inc dynamic balance awareness and overall safety to dec fall risk   Pt cont to need vc for hand and body positioning before STS and SPT to complete turns before sitting , pt off 1:1 but cont with bed and chair alarm   Pt return to recliner with all needs in reach and chair alarm on Cont POC   Barriers to Discharge Inaccessible home environment;Decreased caregiver support   Plan   Progress Progressing toward goals   PT Therapy Minutes   PT Time In 1230   PT Time Out 1400   PT Total Time (minutes) 90   PT Mode of treatment - Individual (minutes) 80   PT Mode of treatment - Concurrent (minutes) 10   PT Mode of treatment - Group (minutes) 0   PT Mode of treatment - Co-treat (minutes) 0   PT Mode of Treatment - Total time(minutes) 90 minutes   PT Cumulative Minutes 1488   Therapy Time missed   Time missed?  No

## 2023-03-21 NOTE — PLAN OF CARE
Problem: PAIN - ADULT  Goal: Verbalizes/displays adequate comfort level or baseline comfort level  Description: Interventions:  - Encourage patient to monitor pain and request assistance  - Assess pain using appropriate pain scale  - Administer analgesics based on type and severity of pain and evaluate response  - Implement non-pharmacological measures as appropriate and evaluate response  - Consider cultural and social influences on pain and pain management  - Notify physician/advanced practitioner if interventions unsuccessful or patient reports new pain  Outcome: Progressing     Problem: INFECTION - ADULT  Goal: Absence or prevention of progression during hospitalization  Description: INTERVENTIONS:  - Assess and monitor for signs and symptoms of infection  - Monitor lab/diagnostic results  - Monitor all insertion sites, i e  indwelling lines, tubes, and drains  - Monitor endotracheal if appropriate and nasal secretions for changes in amount and color  - [place] appropriate cooling/warming therapies per order  - Administer medications as ordered  - Instruct and encourage patient and family to use good hand hygiene technique  - Identify and instruct in appropriate isolation precautions for identified infection/condition  Outcome: Progressing  Goal: Absence of fever/infection during neutropenic period  Description: INTERVENTIONS:  - Monitor WBC    Outcome: Progressing     Problem: SAFETY ADULT  Goal: Patient will remain free of falls  Description: INTERVENTIONS:  - Educate patient/family on patient safety including physical limitations  - Instruct patient to call for assistance with activity   - Consult OT/PT to assist with strengthening/mobility   - Keep Call bell within reach  - Keep bed low and locked with side rails adjusted as appropriate  - Keep care items and personal belongings within reach  - Initiate and maintain comfort rounds  - Make Fall Risk Sign visible to staff  - Offer Toileting every 2 Hours, in advance of need  - Initiate/Maintain bed  chair alarm  - Obtain necessary fall risk management equipment: nonskid socks  - Apply yellow socks and bracelet for high fall risk patients  - Consider moving patient to room near nurses station  Outcome: Progressing  Goal: Maintain or return to baseline ADL function  Description: INTERVENTIONS:  -  Assess patient's ability to carry out ADLs; assess patient's baseline for ADL function and identify physical deficits which impact ability to perform ADLs (bathing, care of mouth/teeth, toileting, grooming, dressing, etc )  - Assess/evaluate cause of self-care deficits   - Assess range of motion  - Assess patient's mobility; develop plan if impaired  - Assess patient's need for assistive devices and provide as appropriate  - Encourage maximum independence but intervene and supervise when necessary  - Involve family in performance of ADLs  - Assess for home care needs following discharge   - Consider OT consult to assist with ADL evaluation and planning for discharge  - Provide patient education as appropriate  Outcome: Progressing  Goal: Maintains/Returns to pre admission functional level  Description: INTERVENTIONS:  - Perform BMAT or MOVE assessment daily    - Set and communicate daily mobility goal to care team and patient/family/caregiver  - Collaborate with rehabilitation services on mobility goals if consulted  - Perform Range of Motion 3 times a day  - Reposition patient every 2 hours    - Dangle patient 3 times a day  - Stand patient 3 times a day  - Ambulate patient 3 times a day  - Out of bed to chair 3 times a day   - Out of bed for meals 3 times a day  - Out of bed for toileting  - Record patient progress and toleration of activity level   Outcome: Progressing     Problem: DISCHARGE PLANNING  Goal: Discharge to home or other facility with appropriate resources  Description: INTERVENTIONS:  - Identify barriers to discharge w/patient and caregiver  - Arrange for needed discharge resources and transportation as appropriate  - Identify discharge learning needs (meds, wound care, etc )  - Arrange for interpretive services to assist at discharge as needed  - Refer to Case Management Department for coordinating discharge planning if the patient needs post-hospital services based on physician/advanced practitioner order or complex needs related to functional status, cognitive ability, or social support system  Outcome: Progressing     Problem: Nutrition/Hydration-ADULT  Goal: Nutrient/Hydration intake appropriate for improving, restoring or maintaining nutritional needs  Description: Monitor and assess patient's nutrition/hydration status for malnutrition  Collaborate with interdisciplinary team and initiate plan and interventions as ordered  Monitor patient's weight and dietary intake as ordered or per policy  Utilize nutrition screening tool and intervene as necessary  Determine patient's food preferences and provide high-protein, high-caloric foods as appropriate       INTERVENTIONS:  - Monitor oral intake, urinary output, labs, and treatment plans  - Assess nutrition and hydration status and recommend course of action  - Evaluate amount of meals eaten  - Assist patient with eating if necessary   - Allow adequate time for meals  - Recommend/ encourage appropriate diets, oral nutritional supplements, and vitamin/mineral supplements  - Order, calculate, and assess calorie counts as needed  - Recommend, monitor, and adjust tube feedings and TPN/PPN based on assessed needs  - Assess need for intravenous fluids  - Provide specific nutrition/hydration education as appropriate  - Include patient/family/caregiver in decisions related to nutrition  Outcome: Progressing     Problem: Prexisting or High Potential for Compromised Skin Integrity  Goal: Skin integrity is maintained or improved  Description: INTERVENTIONS:  - Identify patients at risk for skin breakdown  - Assess and monitor skin integrity  - Assess and monitor nutrition and hydration status  - Monitor labs   - Assess for incontinence   - Turn and reposition patient  - Assist with mobility/ambulation  - Relieve pressure over bony prominences  - Avoid friction and shearing  - Provide appropriate hygiene as needed including keeping skin clean and dry  - Evaluate need for skin moisturizer/barrier cream  - Collaborate with interdisciplinary team   - Patient/family teaching  - Consider wound care consult   Outcome: Progressing     Problem: MOBILITY - ADULT  Goal: Maintain or return to baseline ADL function  Description: INTERVENTIONS:  -  Assess patient's ability to carry out ADLs; assess patient's baseline for ADL function and identify physical deficits which impact ability to perform ADLs (bathing, care of mouth/teeth, toileting, grooming, dressing, etc )  - Assess/evaluate cause of self-care deficits   - Assess range of motion  - Assess patient's mobility; develop plan if impaired  - Assess patient's need for assistive devices and provide as appropriate  - Encourage maximum independence but intervene and supervise when necessary  - Involve family in performance of ADLs  - Assess for home care needs following discharge   - Consider OT consult to assist with ADL evaluation and planning for discharge  - Provide patient education as appropriate  Outcome: Progressing  Goal: Maintains/Returns to pre admission functional level  Description: INTERVENTIONS:  - Perform BMAT or MOVE assessment daily    - Set and communicate daily mobility goal to care team and patient/family/caregiver  - Collaborate with rehabilitation services on mobility goals if consulted  - Perform Range of Motion 3 times a day  - Reposition patient every 2 hours    - Dangle patient times a day  - Stand patient 3 times a day  - Ambulate patient 3 times a day  - Out of bed to chair 3 times a day   - Out of bed for meals 3 times a day  - Out of bed for toileting  - Record patient progress and toleration of activity level   Outcome: Progressing

## 2023-03-21 NOTE — PROGRESS NOTES
PM&R PROGRESS NOTE:  Emre Marley 75 y o  female MRN: [id number]  Unit/Bed#: -01 Encounter: [id number]      Rehabilitation Diagnosis: Impairment of mobility, safety, Activities of Daily Living (ADLs), and cognitive/communication skills due to Stroke:  01 1  Left Body Involvement (Right Brain)    HPI: Emre Marley is a 75 y o  female with history of asthma, anxiety, bipolar depression, chronic narcotic use, previous enteroviral infection of the heart, seronegative arthropathy of multiple joints, undifferentiated connective tissue disorder, obstructive sleep apnea on home CPAP who presented to the [facility] on 2/22 for altered mental status  According to the patient's daughter on chart review, the patient's mental status had been declining for the prior 48 hours to admission  She did receive Narcan which improved her mental status slightly  Of note she had a recent stroke with revascularization on 2/12/2023 with Dr Jackeyln Cervantes  She was seen by neurology with overall good strength except mildly weak in the lower extremities with brisk reflexes on the left and a positive Haroon's  At this time is felt to be potentially related to polypharmacy in the setting of chronic pain meds versus seizure in the setting of a recent stroke  She was initiated on 81 mg of aspirin daily with a plan of potentially transitioning to Eliquis, plan for EEG  CT of the head was negative  MRI was completed showing a new small acute/recent interval lacunar infarction in the right cerebral pedun there is jacinta  She was started on Eliquis  Nephrology was consulted as well for hypercalcemia  The patient was evaluated by the Rehabilitation team and deemed an appropriate candidate for comprehensive inpatient rehabilitation and admitted to the [facility] on 2/28/2023  3:41 PM patient was seen in room with brother at bedside and all questions regarding the acute inpatient rehabilitation program were answered  We also discussed at length course in the hospital and her last hospitalization to verify events and clarify any imaging or laboratory findings  Patient is eager to start her therapy program       SUBJECTIVE: Patient seen and evaluated in room  No acute events overnight  She states she is sleeping well and her pain is relatively controlled as she is taking her pain medications  She continues to take doses with an overall lower daily morphine equivalents than her prior regimen  Continues with left-sided weakness predominantly in the left hand more than proximally and limited weakness in the left leg  She feels like her energy is overall pretty good and she is fully participating in therapy  We did discuss her case and team conference and are in the process of referrals for subacute rehabilitation as she will need continued therapy prior to returning home  She denies any fever, chills, nausea, vomiting, cough, shortness of breath, diarrhea, constipation  She endorses that she is eating quite a bit and her p o  fluid intake is good however after review of the flowsheet it seems that her overall intake is inconsistent from anywhere from 25 to 75% of her food  ASSESSMENT: Stable, progressing    PLAN:    Rehabilitation  • Functional deficits:  Self care and mobility  • Continue current rehabilitation plan of care to maximize function      • Functional update:   Physical Therapy Occupational Therapy Speech Therapy   Weight Bearing Status: Full Weight Bearing  Transfers: Incidental Touching, Minimal Assistance  Bed Mobility: Incidental Touching  Amulation Distance (ft): 100 feet  Ambulation: Incidental Touching  Assistive Device for Ambulation: Roller Walker  Wheelchair Mobility Distance: 100 ft  Wheelchair Mobility: Supervision  Number of Stairs: 6  Assistive Device for Stairs: Bilateral Hand Rails  Stair Assistance: Incidental Touching  Discharge Recommendations: Skilled Nursing Facility   Eating: Supervision  Grooming: Minimal Assistance  Bathing: Moderate Assistance  Bathing: Moderate Assistance  Upper Body Dressing: Moderate Assistance  Lower Body Dressing: Moderate Assistance  Toileting: Moderate Assistance  Tub/Shower Transfer: Moderate Assistance  Toilet Transfer: Minimal Assistance  Cognition: Exceptions to WNL  Cognition: Decreased Memory, Decreased Executive Functions, Decreased Attention, Decreased Safety  Orientation: Person, Place, Time, Situation   Mode of Communication: Verbal  Speech/Language: Dysarthia  Cognition: Exceptions to WNL  Cognition: Decreased Memory, Decreased Executive Functions, Decreased Attention, Decreased Comprehension, Decreased Safety  Orientation: Person, Place, Time, Situation  Swallowing: Exceptions to WNL  Swallowing: Oral Dysphagia, Pharyngeal Dysphagia, Aspiration Risk  Diet Recommendations: Level 1/Herone, Cami Thick  Discharge Recommendations:  (pending progress)  DC Home with[de-identified] 24 Hour Supervision, 24 Hour Assisteance, Family Support, Home Speech Therapy, Outpatient Speech Therapy       • Estimated Discharge: Transition to subacute rehabilitation    DVT prophylaxis  • On Eliquis     Pain  • Cymbalta 60 mg twice daily  • Gabapentin 100 mg twice daily  • Morphine 7 5 mg 4 times a day as needed  • Acetaminophen 650 mg every 6 hours as needed     Bladder plan  • Continent     Bowel plan  • Continent  • Last bowel movement 3/20     Code Status  • Level 1 full code      * Acute CVA (cerebrovascular accident) [facility])  Assessment & Plan  3/8 - neuro exam stable - continue to monitor closely   CT head 3/7 - Evolving right MCA territory infarcts with associated petechial hemorrhage which is best seen on the recent MRI examination  There is no discrete parenchymal hematoma  - discussed above with johny Hull - no acute bleeding noted   MRI brain 2/22 - 1  New small acute /more recent interval lacunar infarction in the right cerebral peduncle (series 4, image 14)    2  Extensive multifocal large, previously present right middle cerebral artery infarction which is evolving with superimposed areas of evolving hemorrhage likely related to hemorrhagic transformation of subacute right MCA infarction  No significant   mass effect  3   Mild, chronic microangiopathy  S/P thrombectomy 2/12 by Dr Lazara Daugherty   Currently on a dysphagia 1 diet with thin liquids  Secondary stroke prophylaxis at this time with Eliquis 5 mg twice daily (aspirin d/c'd earlier); and statin; optimal BP control   PT, OT, SLP  Consideration for neuropsychology if patient agreeable  Review modifiable risk factors and provide stroke education  Monitor for poststroke pain  Close neuro follow-up as patient has had multiple strokes in the last 2 months    Hypokalemia  Assessment & Plan  Hypokalemia with potassium down to 3 4 given 40 mill equivalents today on 3/10 but IV infiltrated  Able to get PO dose over weekend  Potasium now WNL  Recheck BMP as of 3/20 up to 3 7 and stable    Wound of left ankle  Assessment & Plan  On lateral ankle possibly occurred at time of fall, reviewed wound getting SilvaSorb examined today on 3/15 with nursing  - Wound care c/s  1  Cleanse L lateral ankle wound with NSS, pat dry, and apply silvasorb gel to the wound bed  Cover with Allevyn foam dressing  Isacc dressing with T  Change every day and as needed for soilage/dislodgement  2  Preventative nursing skin care orders placed  3   Pressure relief, turning and repositioning with foam offloading wedges and waffle cushion to the care  Prevalon boot     Dysphagia  Assessment & Plan  - Current diet: Modified - D1, NTL after failed VBS, GI consulted, plan for esophogram, protonix added  - Recommend VBS study-VBS completed on 3/9 with evidence for esophageal dysmotility and dysphagia recommending puréed with nectar thick, okay for ice chips  - SLP evaluate and treat decline in swallowing   - Aspiration precautions; HOB completely upright when eating, 100% supervision, monitor for pocketing  - Hold oral intake if patient too confused or lethargic and notify MD   - Oral care with meals and bedtime  - Ensure adequate hydration  - Nutrition consult to assist with management   - Rehab nursing and staff to ensure safe and appropriate feeding to decrease risk of aspiration     - Rehab physician oversight dysphagia management and risks associated with it  - Monitor for appropriate fluid and nutrition intake      Gout of foot  Assessment & Plan  Much improved on prednisone  Uric acid wnl   R 1st MTPJ redness and focal tenderness without significant edema or skin breakdown   - IM feels most c/w gout and have ordered prednisone - taper thru 3/12  - Monitor for improvement or non-improvement and other etiologies as well closely     AMS (altered mental status)  Assessment & Plan  Mentation improving as of 3/13 and more consistently after reduction in opioids as well as addition of Sensipar decreasing her overall calcium levels- Continues to be stable as of 3/17  3/7 Patient with some sleepiness at times apparently last several days with poor activity tolerance and now s/p fall - appears back to recent baseline on follow-up eval   CT head 3/7 - Evolving right MCA territory infarcts with associated petechial hemorrhage which is best seen on the recent MRI examination  There is no discrete parenchymal hematoma   - discussed above with radiologist Della - no acute bleeding noted   - Ensure adequate nutrition and hydration with altered diet and sub-optimal intake BUN/Cr acceptable   - Continue Eliquis, monitor vitals scout  - Comgmt with IM team   - Patient afebrile; CBC wnl; LFTs wnl; Ammonia wnl  - Continue Abx of UTI   - Calcium elevated > optimal mgmt per endo > adding Sensipar   - Hx of SHAR - continue 2L at night O2 with spot checks - refusing CPAP - obtain noct ox on 2L   - Hold standing long-acting MS Contin 15mg Q12H and transitioned to MS IR 7 5mg Q6H PRN   - Gabapentin 100mg BID weaned down recently            UTI (urinary tract infection)  Assessment & Plan  UA last week with culture growing greater than 100,000 CFU Morganella and Serratia  Started on Ciprofloxacin but transitioned to Cefpodixime per prior providers with plan to continue into 3/10   Dysuria improving     History of stroke  Assessment & Plan  Patient with recent admission in early 2023 for right MCA CVA with petechial hemorrhage and residual left-sided weakness and dysarthria  Patient had mechanical thrombectomy with Dr Julio Bosch on 2/12    Obstructive sleep apnea  Assessment & Plan  Patient refusing CPAP 2/2 claustrophobia - refuses home or hospital CPAP   2L O2 at night with spot checks   Obtain noct ox on 2L; repeat noct ox prior to d/c for possible home DME     History of bacteremia  Assessment & Plan  Unclear etiology initially of whether her altered mental status was related to stroke versus polypharmacy versus opioid overdose versus infection versus seizure  1/2 micrococcus with a second set staph coag negative and followed by infectious disease after course of antibiotic treatment  Has been off of antibiotics and repeat blood cultures negative to date, ID felt may be contaminant    Discoloration of skin of foot  Assessment & Plan  N/V intact - no significant discoloration today   Per daughter and patient patient's foot will become blue or red and change colors frequently  No evidence for infectious etiology and had a lower extremity arterial duplex with no evidence of any significant arterial occlusive disease  May be related to the seronegative spondyloarthropathies  Likely the source of of the inaccurate and inconsistent pulse oximetry readings in the hands as well    Hypercalcemia  Assessment & Plan  Nephrology had been following, nonsuppressed PTH with concerns for primary hyperparathyroidism - they defer to endo on mgmt   C spine XR 3/7 - Salt-and-pepper appearance of the skull is partially "accentuated by the image processing /sharpening algorithm, though prior head CT suggests this is a true finding  Correlate for clinical/laboratory features of hyperparathyroidism; Salt-and-pepper appearance of the skull accentuated by image processing/sharpening algorithm  However, prior head CT shows a mottled appearance of the skull as well suggesting hyperparathyroidism  No focal lytic or blastic lesions  No acute fractures  Endocrine consult   \"SPEP-no monoclonal bands, UPEP- no monoclonal bands, PTH RP less than 2, 25-hydroxy vitamin D 41 7, 1,25 hydroxy vitamin D 23 7 low, Phos 2 5 low, PTH 65 4   Assessment/Plan:  1  Hypercalcemia: Suspected to be on the basis of primary hyperparathyroidism: Corrected calcium continues to increase slightly  Sensipar 30 mg daily was started and is being tolerated  Given history of osteoporosis, if appropriate surgical candidate consider surgery referral as outpatient  Follow-up with endocrinology as outpatient  Monitor corrected calcium level  Adjust dose as needed  Status post a CT of the chest abdomen and pelvis with no overt malignancy  - Patient has allergy to contrast - discussed with endo fellow  -Calcium as of 3/20 continues to come down with the use of Sensipar  Most recently down to 8 8 from a peak of 12      HTN (hypertension)  Assessment & Plan  Patient with some orthostasis  IM consulted and with overall management at their discretion during ARC course  Monitor orthostatics; PRN ADDY hose, abdominal binder - ensure adequate hydration   Currently on metoprolol succinate 25 mg twice daily with holding parameters   Plan per cardiology did not add an ARB in the setting of heart failure however as blood pressure allows    Bipolar depression ([facility] [address] )  Assessment & Plan  Currently on Wellbutrin 100 mg twice daily, Lamictal 150 mg nightly, and Cymbalta 60 mg twice daily  Consistent with home regimen  Continue monitoring for stability, mood lability    Consider " "neuropsychology consultation if patient agreeable    Fall  Assessment & Plan  3/7 early morning - per report \"unwitnessed FALL; Per RN patient set off bed alarm, but has already fallen onto her left side  Confused and wanting to wash her face  Once more awake, mentation improved  \"  - CTH evolving infarct without new bleeding per rads  - L knee mild pain but good ROM without significant edema or instability > improving - XR unremarkable - monitor   - Mild L hip pain but good ROM and can adequately WB > improving - L hip/pelvis XR - mild hip OA without acute findings -  monitor  - Some L shoulder pain - some mildly decreased AROM, +Muñoz > stable -  L shoulder XR - unremarkable - monitor    - Subtle neck pain but full ROM without radicular complaints or focal weakness, sensory changes > improving - C spine XR - no acute findings or change from prior - chronic changes > Chronic hyperkyphosis  Anterolisthesis at C4-5 is unchanged  No acute subluxation; Salt-and-pepper appearance of the skull accentuated by image processing/sharpening algorithm  Multilevel moderate to severe discogenic and uncovertebral degenerative changes greatest at C5-6 and C6-7  Multilevel mild facet degenerative changes greatest at C4-5  > monitor   - Optimal mgmt with wakefulness/AMS - continue 2L at night O2 with spot checks - refusing CPAP  - Has been demonstrating use of call bell and not getting up overnight without assistance  After team discussion, plan for discontinuing observation  Cervical spondylosis  Assessment & Plan  C spine XR 3/7 - Chronic hyperkyphosis  Anterolisthesis at C4-5 is unchanged  No acute subluxation; Salt-and-pepper appearance of the skull accentuated by image processing/sharpening algorithm  However, prior head CT shows a mottled appearance of the skull as well suggesting hyperparathyroidism  No focal lytic or blastic lesions  No acute fractures    Multilevel moderate to severe discogenic and " uncovertebral degenerative changes greatest at C5-6 and C6-7    Multilevel mild facet degenerative changes greatest at C4-5   - No signs of myelopathy or radiculopathy - monitor     Osteoporosis  Assessment & Plan  History of an L1 compression fracture status post kyphoplasty  Was previously on Prolia has not been on since 2020  Outpatient referral to endocrine    CHF (congestive heart failure) ([facility] [address] )  Assessment & Plan  Wt Readings from Last 3 Encounters:   03/20/23 59 9 kg (132 lb 0 9 oz)   02/16/23 62 6 kg (138 lb)   02/12/23 67 9 kg (149 lb 11 1 oz)       Cardiomyopathy with reduced ejection fraction as low as 10 to 15% back in 2004 but has improved since and most recently was 25 to 30% on last admission  CXR 3/7 unremarkable; does not appear volume overloaded   Patient was offered LifeVest but declined  On metoprolol succinate 25 mg twice daily, with plan to add an ARB as her blood pressure would allow  Strict I's and O's and weights to be checked every Monday Wednesday Friday  Follows with Dr Ld Guzman at [facility]      Seronegative arthropathy of multiple sites [facility])  [address] with rheumatology and is on Plaquenil 200 mg Monday through Friday and 400 mg on Saturday Sunday  Continue home regimen and follow-up with rheumatology on discharge    Asthma  Assessment & Plan  3/8 saturating well on RA/2L   3/7 afternoon - patient had questionable desat with therapy but had full improved mentation and was in no resp or other distress at that time  - Comgmt with IM who also evaluated patient at this time    - She historically has had difficulty obtaining O2 from finger tips at times - when pulse ox moved to toe she had good oxygenation on RA-2L  - CXR no acute findings - bibasilar scarring   - Continue use of albuterol inhaler 2 puffs every 6 hours as needed    Chronic pain syndrome  Assessment & Plan  History of chronic pain secondary to compression fractures in her thoracic spine and chronic rib "fractures   Patient with over sleepiness at times at sedating meds decreased some recently   · Holding standing long-acting MS Contin 15mg Q12H and transitioned to MS IR 7 5mg Q6H PRN > pain still adequately controlled > monitor closely   · Gabapentin 100mg BID   · Cymbalta 60 milligrams twice daily  · Acetaminophen 650 mg every 6 hours as needed  · Was previously on oxycodone as needed at [facility] but per family due to concerns for polypharmacy and additional opioids beyond her morphine has not been on an acute care since 2/16  · Lidoderm and Bengay  Aqua K also ordered with instructions not to place over same area  Appreciate IM consultants medical co-management  Labs, medications, and imaging personally reviewed  ROS:  A ten point review of systems was completed on 03/21/23 and pertinent positives are listed in subjective section  All other systems reviewed were negative  OBJECTIVE:   BP 92/52   Pulse 87   Temp 98 °F (36 7 °C) (Axillary)   Resp 18   Ht 5' 3\" (1 6 m)   Wt 59 9 kg (132 lb 0 9 oz)   SpO2 94%   BMI 23 39 kg/m²     Physical Exam  Vitals and nursing note reviewed  Constitutional:       General: She is not in acute distress  Appearance: Normal appearance  HENT:      Head: Atraumatic  Comments: Left-sided facial droop     Right Ear: External ear normal       Left Ear: External ear normal       Nose: Nose normal  No rhinorrhea  Mouth/Throat:      Mouth: Mucous membranes are moist       Pharynx: Oropharynx is clear  Eyes:      General: No scleral icterus  Cardiovascular:      Rate and Rhythm: Normal rate  Pulses: Normal pulses  Heart sounds: Normal heart sounds  Pulmonary:      Effort: Pulmonary effort is normal  No respiratory distress  Breath sounds: Normal breath sounds  Abdominal:      General: There is no distension  Palpations: Abdomen is soft  Musculoskeletal:      Right lower leg: No edema        Left lower leg: No " edema    Skin:     General: Skin is warm and dry  Coloration: Skin is pale  Neurological:      Mental Status: She is oriented to person, place, and time  Sensory: No sensory deficit        Comments: Dysarthria, left-sided weakness throughout the left arm more so than the left leg however pretty close to full strength when compared to the contralateral side but remains at a 4/5   Psychiatric:         Mood and Affect: Mood normal          Behavior: Behavior normal           Lab Results   Component Value Date    WBC 7 43 03/20/2023    HGB 12 4 03/20/2023    HCT 39 0 03/20/2023    MCV 99 (H) 03/20/2023     03/20/2023     Lab Results   Component Value Date    SODIUM 143 03/20/2023    K 3 7 03/20/2023     (H) 03/20/2023    CO2 28 03/20/2023    BUN 13 03/20/2023    CREATININE 0 82 03/20/2023    GLUC 88 03/20/2023    CALCIUM 8 8 03/20/2023     Lab Results   Component Value Date    INR 0 95 02/22/2023    INR 0 90 02/12/2023    INR 1 12 04/22/2022    PROTIME 12 8 02/22/2023    PROTIME 12 1 02/12/2023    PROTIME 14 0 04/22/2022           Current Facility-Administered Medications:   •  acetaminophen (TYLENOL) tablet 650 mg, 650 mg, Oral, Q6H PRN, Garrie Areola, DO, 650 mg at 03/18/23 7474  •  albuterol (PROVENTIL HFA,VENTOLIN HFA) inhaler 2 puff, 2 puff, Inhalation, Q6H PRN, Garrie Areola, DO, 2 puff at 03/07/23 1358  •  aluminum-magnesium hydroxide-simethicone (MYLANTA) oral suspension 30 mL, 30 mL, Oral, Q4H PRN, Garrie Areola, DO, 30 mL at 03/20/23 1643  •  apixaban (ELIQUIS) tablet 5 mg, 5 mg, Oral, BID, Garrie Areola, DO, 5 mg at 03/21/23 6506  •  atorvastatin (LIPITOR) tablet 40 mg, 40 mg, Oral, QPM, Garrie Areola, DO, 40 mg at 03/20/23 1832  •  bisacodyl (DULCOLAX) rectal suppository 10 mg, 10 mg, Rectal, Once, Radha Murrieta MD  •  buPROPion [facility]) tablet 100 mg, 100 mg, Oral, BID, Eun Snell DO, 100 mg at 03/21/23 8481  •  cinacalcet (SENSIPAR) tablet 30 mg, 30 mg, Oral, Daily With Breakfast, Davis Alvarado MD, 30 mg at 03/21/23 6291  •  DULoxetine (CYMBALTA) delayed release capsule 60 mg, 60 mg, Oral, BID, Rosa Loges, DO, 60 mg at 03/21/23 7443  •  gabapentin (NEURONTIN) capsule 100 mg, 100 mg, Oral, BID, Tamra Munoz MD, 100 mg at 03/21/23 0920  •  glycerin-hypromellose- (ARTIFICIAL TEARS) ophthalmic solution 1 drop, 1 drop, Both Eyes, Q4H PRN, Rosa Logpapito, DO, 1 drop at 03/20/23 1832  •  hydroxychloroquine (PLAQUENIL) tablet 200 mg, 200 mg, Oral, Once per day on Mon Tue Wed Thu Fri, Carloz Arellano Actis, DO, 200 mg at 03/21/23 2571  •  hydroxychloroquine (PLAQUENIL) tablet 400 mg, 400 mg, Oral, Once per day on Sun Sat, Carloz Weberen, DO, 400 mg at 03/19/23 3547  •  lamoTRIgine (LaMICtal) tablet 150 mg, 150 mg, Oral, HS, Rosa Logpapito, DO, 150 mg at 03/20/23 2205  •  lidocaine (LIDODERM) 5 % patch 1 patch, 1 patch, Topical, Daily, Trish Barrera MD, 1 patch at 03/21/23 9282  •  LORazepam (ATIVAN) tablet 0 25 mg, 0 25 mg, Oral, BID PRN, Tamra Munoz MD, 0 25 mg at 03/19/23 2123  •  melatonin tablet 3 mg, 3 mg, Oral, HS, LAURA Rutledge, 3 mg at 03/20/23 2205  •  menthol-methyl salicylate (BENGAY) 53-13 % cream, , Apply externally, 4x Daily PRN, Trish Barrera MD, Given at 03/06/23 1227  •  metoprolol succinate (TOPROL-XL) 24 hr tablet 25 mg, 25 mg, Oral, BID, LAURA Salgado, 25 mg at 03/20/23 0802  •  morphine (MSIR) IR tablet 7 5 mg, 7 5 mg, Oral, Q6H PRN, Tamra Munoz MD, 7 5 mg at 03/21/23 6650  •  ondansetron (ZOFRAN-ODT) dispersible tablet 4 mg, 4 mg, Oral, Q6H PRN, Rosa Mcdowell DO, 4 mg at 03/20/23 0847  •  pantoprazole (PROTONIX) EC tablet 40 mg, 40 mg, Oral, Early Morning, Tammi Draper MD, 40 mg at 03/21/23 9871  •  polyethylene glycol (MIRALAX) packet 17 g, 17 g, Oral, Daily PRN, Rosa Mcdowell DO, 17 g at 03/04/23 1930  •  prazosin (MINIPRESS) capsule 1 mg, 1 mg, Oral, HS, Rosa Mcdowell DO, 1 mg at 03/15/23 2110  • senna-docusate sodium (SENOKOT S) 8 6-50 mg per tablet 1 tablet, 1 tablet, Oral, BID, Juan Manuel Guadarrama, , 1 tablet at 03/21/23 8013  •  sodium chloride (OCEAN) 0 65 % nasal spray 1 spray, 1 spray, Each Nare, Q1H PRN, Yomaira Romero MD, 1 spray at 03/08/23 0300  •  white petrolatum-mineral oil (EUCERIN,HYDROCERIN) cream, , Topical, TID, Yomaira Romero MD, 1 application   at 03/21/23 0847    Past Medical History:   Diagnosis Date   • Anxiety    • Asthma    • Bipolar depression ([place] [address] )    • Chronic narcotic dependence ([place] [address] )    • Chronic pain    • Depression    • Enterovirus heart infection    • SHAR (obstructive sleep apnea)    • Osteoarthritis    • Peripheral neuropathy    • Plantar fasciitis of right foot    • Senile osteoporosis    • Seronegative arthropathy of multiple sites [facility])    • Undifferentiated connective tissue disease ([place] [address] )        Patient Active Problem List    Diagnosis Date Noted   • Acute CVA (cerebrovascular accident) ([place] [address] ) 02/23/2023   • Hypokalemia 03/10/2023   • AMS (altered mental status) 03/09/2023   • Gout of foot 03/09/2023   • Dysphagia 03/09/2023   • Wound of left ankle 03/09/2023   • UTI (urinary tract infection) 03/06/2023   • Obstructive sleep apnea 02/28/2023   • History of stroke 02/28/2023   • History of bacteremia 02/24/2023   • Hypercalcemia 02/22/2023   • Discoloration of skin of foot 02/22/2023   • Abdominal pain 02/22/2023   • HFrEF (heart failure with reduced ejection fraction) ([place] [address] ) 02/13/2023   • Stroke ([place] [address] ) 02/12/2023   • HTN (hypertension) 02/12/2023   • Cardiomyopathy ([place] [address] ) 02/07/2023   • Vitamin D insufficiency 02/07/2023   • LAILA (acute kidney injury) ([place] [address] ) 04/23/2022   • Polypharmacy 04/23/2022   • 'light-for-dates' infant with signs of fetal malnutrition 04/22/2022   • Bipolar depression ([facility] ) 03/04/2022   • Rheumatoid arthritis ([facility] ) 03/04/2022   • Closed fracture of multiple ribs of right side 03/02/2022   • Traumatic pneumothorax 03/02/2022   • Closed fracture of transverse process of lumbar vertebra ([place] [address] ) 03/02/2022   • Liver contusion 03/02/2022   • Fall 03/02/2022   • Acute pain due to trauma 03/02/2022   • Right shoulder pain 03/02/2022   • Severe protein-calorie malnutrition ([place] [address] ) 03/02/2022   • Undifferentiated connective tissue disease ([place] [address] ) 12/29/2021   • Primary generalized (osteo)arthritis 12/29/2021   • Peripheral neuropathy 12/29/2021   • Osteoporosis 12/29/2021   • Hx of compression fracture of spine 12/29/2021   • Lumbar spondylosis 12/29/2021   • Cervical spondylosis 12/29/2021   • Anemia 06/26/2021   • Chronic pain syndrome    • Asthma    • Ambulatory dysfunction    • Seronegative arthropathy of multiple sites ([place] [address] )    • Gastroesophageal reflux disease without esophagitis    • Mixed stress and urge urinary incontinence    • CHF (congestive heart failure) ([place] [address] )         Karyna Lab, DO  Physical Medicine and [facility]    I have spent a total time of 40 minutes on 03/21/23 in caring for this patient including Patient and family education, Impressions, Counseling / Coordination of care, Documenting in the medical record, Communicating with other healthcare professionals  and Participation in team conference along with the therapy team, case management, nursing with reviewing of barriers and progress with plans for continued ongoing medical and functional care, plan for transitioning to subacute rehabilitation

## 2023-03-22 RX ADMIN — LAMOTRIGINE 150 MG: 100 TABLET ORAL at 21:00

## 2023-03-22 RX ADMIN — BUPROPION HYDROCHLORIDE TABLETS 100 MG: 100 TABLET, FILM COATED ORAL at 17:19

## 2023-03-22 RX ADMIN — MORPHINE SULFATE 7.5 MG: 15 TABLET ORAL at 11:52

## 2023-03-22 RX ADMIN — PANTOPRAZOLE SODIUM 40 MG: 40 TABLET, DELAYED RELEASE ORAL at 05:26

## 2023-03-22 RX ADMIN — CINACALCET 30 MG: 30 TABLET, FILM COATED ORAL at 06:56

## 2023-03-22 RX ADMIN — GABAPENTIN 100 MG: 100 CAPSULE ORAL at 08:35

## 2023-03-22 RX ADMIN — HYDROXYCHLOROQUINE SULFATE 200 MG: 200 TABLET ORAL at 06:56

## 2023-03-22 RX ADMIN — SENNOSIDES AND DOCUSATE SODIUM 1 TABLET: 8.6; 5 TABLET ORAL at 17:19

## 2023-03-22 RX ADMIN — ALUMINUM HYDROXIDE, MAGNESIUM HYDROXIDE, AND DIMETHICONE 30 ML: 200; 20; 200 SUSPENSION ORAL at 20:55

## 2023-03-22 RX ADMIN — METOPROLOL SUCCINATE 25 MG: 25 TABLET, EXTENDED RELEASE ORAL at 17:18

## 2023-03-22 RX ADMIN — ONDANSETRON 4 MG: 4 TABLET, ORALLY DISINTEGRATING ORAL at 08:35

## 2023-03-22 RX ADMIN — SENNOSIDES AND DOCUSATE SODIUM 1 TABLET: 8.6; 5 TABLET ORAL at 08:36

## 2023-03-22 RX ADMIN — LIDOCAINE 5% 1 PATCH: 700 PATCH TOPICAL at 08:36

## 2023-03-22 RX ADMIN — MORPHINE SULFATE 7.5 MG: 15 TABLET ORAL at 06:57

## 2023-03-22 RX ADMIN — BUPROPION HYDROCHLORIDE TABLETS 100 MG: 100 TABLET, FILM COATED ORAL at 08:37

## 2023-03-22 RX ADMIN — ATORVASTATIN CALCIUM 40 MG: 40 TABLET, FILM COATED ORAL at 17:19

## 2023-03-22 RX ADMIN — APIXABAN 5 MG: 5 TABLET, FILM COATED ORAL at 17:18

## 2023-03-22 RX ADMIN — PRAZOSIN HYDROCHLORIDE 1 MG: 1 CAPSULE ORAL at 21:00

## 2023-03-22 RX ADMIN — APIXABAN 5 MG: 5 TABLET, FILM COATED ORAL at 08:35

## 2023-03-22 RX ADMIN — METOPROLOL SUCCINATE 25 MG: 25 TABLET, EXTENDED RELEASE ORAL at 08:35

## 2023-03-22 RX ADMIN — DULOXETINE HYDROCHLORIDE 60 MG: 60 CAPSULE, DELAYED RELEASE ORAL at 20:57

## 2023-03-22 RX ADMIN — Medication: at 08:41

## 2023-03-22 RX ADMIN — DULOXETINE HYDROCHLORIDE 60 MG: 60 CAPSULE, DELAYED RELEASE ORAL at 08:36

## 2023-03-22 RX ADMIN — MELATONIN 3 MG: at 21:00

## 2023-03-22 RX ADMIN — GABAPENTIN 100 MG: 100 CAPSULE ORAL at 17:18

## 2023-03-22 NOTE — PROGRESS NOTES
Internal Medicine Progress Note  Patient: Michell Raines  Age/sex: 75 y o  female  Medical Record #: [id number]      ASSESSMENT/PLAN: (Interval History)  Michell Raines is seen and examined and management for following issues:    Recent right MCA infarct W/petechial hemorrhage  • S/P thrombectomy by Dr Negin Catalan 2/12  • Continue Eliquis 5 mg twice daily/statin   • Follow-up with neurosurgery/neurology as scheduled     NICM  • EF has been as low as 10 to 15% in the past  • In 2022 was 65%  • Now down to 30% = pt declined LifeVest  • Cardiology felt this was secondary to her recent CVA as well as possible medication noncompliance  • They recommend continuing beta-blocker and adding ARB when BP allows  • Will need outpatient ischemia work-up  • Followed by Dr Tiki Jones at [facility] however would like to follow with [facility] on dc  • Can add arb if bp improves     Seronegative rheumatoid arthritis  • A/T rheumatology takes Plaquenil 200 mg Monday through Friday 400 mg on Saturday and Sunday  • Continue same  • Follow-up on discharge     Hypercalcemia  · Seen by renal/endocrine on acute side  · Started on Sensipar and now down to 8 8  · Endocrine input appreciated  Will need outpt follow-up and potentially a surgical evaluation      Fall  · 3/6/23 onto left side  · Abrasion on left knee and Xray negative for fracture  · CT and Xrays negative     R great toe gout  · S/p prednisone  · Uric acid normal  · improved     Chronic pain  • Due to compression fractures of her thoracic spine/chronic rib fractures  • Lethargic 3/7 and MS contin changed to IR  • CONT OFF MS CONTIN  • Generally takes about 1-2 doses daily  • Pain management per PMR  • Pain stable     Bipolar disorder  • Continue current medication regimen  • Follow-up with psychiatry on discharge  • Neuropsych following  • stable    Anxiety  · Moods are stable    Confusion  · Improved  · Off continuous observation    SHAR  • Does not use CPAP at home d/t claustraphobia  • OK use oxygen overnight     Dysuria/Urinary frequency/incontinence  • Urine culture +CRE  • s/p vantin 200mg bid through 3/9/23  • resolved    Hypokalemia  · stable      DC planning: 3/22/23=await SNF    The above assessment and plan was reviewed and updated as determined by my evaluation of the patient on 3/22/2023      Labs:   Results from last 7 days   Lab Units 03/20/23  0558   WBC Thousand/uL 7 43   HEMOGLOBIN g/dL 12 4   HEMATOCRIT % 39 0   PLATELETS Thousands/uL 213     Results from last 7 days   Lab Units 03/20/23  0558 03/16/23  0705   SODIUM mmol/L 143 141   POTASSIUM mmol/L 3 7 3 8   CHLORIDE mmol/L 111* 112*   CO2 mmol/L 28 28   BUN mg/dL 13 16   CREATININE mg/dL 0 82 0 91   CALCIUM mg/dL 8 8 9 1                   Review of Scheduled Meds:  Current Facility-Administered Medications   Medication Dose Route Frequency Provider Last Rate   • acetaminophen  650 mg Oral Q6H PRN Anel Arrieta, DO     • albuterol  2 puff Inhalation Q6H PRN Anel Arrieta DO     • aluminum-magnesium hydroxide-simethicone  30 mL Oral Q4H PRN Anel Arrieta, DO     • apixaban  5 mg Oral BID Anel Arrieta DO     • atorvastatin  40 mg Oral QPM Anel Arrieta DO     • bisacodyl  10 mg Rectal Once Stefan Zelaya MD     • buPROPion  100 mg Oral BID Anel Arrieta, DO     • cinacalcet  30 mg Oral Daily With Breakfast Anika Flores MD     • DULoxetine  60 mg Oral BID Anel Arrieta DO     • gabapentin  100 mg Oral BID Stefan Zelaya MD     • glycerin-hypromellose-  1 drop Both Eyes Q4H PRN Anel Arrieta, DO     • hydroxychloroquine  200 mg Oral Once per day on Mon Tue Wed Thu Fri Anel Arrieta DO     • hydroxychloroquine  400 mg Oral Once per day on Sun Sat Anel Arrieta DO     • lamoTRIgine  150 mg Oral HS Anel Arrieta, DO     • lidocaine  1 patch Topical Daily Winnie Genao MD     • LORazepam  0 25 mg Oral BID PRN Stefan Zelaya MD     • melatonin  3 mg Oral HS LAUAR Luke • menthol-methyl salicylate   Apply externally 4x Daily PRN Roge Mcmahon MD     • metoprolol succinate  25 mg Oral BID LAURA Blanca     • morphine  7 5 mg Oral Q6H PRN Renny Seth MD     • ondansetron  4 mg Oral Q6H PRN Beti Reef, DO     • pantoprazole  40 mg Oral Early Morning Lotus Alejo MD     • polyethylene glycol  17 g Oral Daily PRN Beti Reef, DO     • prazosin  1 mg Oral HS Beti Reef, DO     • senna-docusate sodium  1 tablet Oral BID Beti Reef, DO     • sodium chloride  1 spray Each Nare Q1H PRN Dk Johnson MD     • white petrolatum-mineral oil   Topical TID Dk Johnson MD         Subjective/ HPI: Patient seen and examined  Patients overnight issues or events were reviewed with nursing or staff during rounds or morning huddle session  New or overnight issues include the following:     Pt seen in her room  No issues overnight  She is contemplating her next rehab  She is c/o soft BM's        ROS:   A 10 point ROS was performed; negative except as noted above  Imaging:     FL barium swallow video w speech   Final Result by [employer], DOCUMENTATION (03/09 1306)      FL barium swallow video w speech   Final Result by  (03/09 1550)      XR hip/pelv 2-3 vws left if performed   Final Result by Doroteo Leung MD (03/08 1633)      No acute osseous abnormality  Workstation performed: [id number]         XR shoulder 2+ vw left   Final Result by Doroteo Leung MD (03/08 6497)      No acute osseous abnormality  Workstation performed: [id number]         XR spine cervical 2 or 3 vw injury   Final Result by Doroteo Leung MD (03/08 0101)      No acute osseous abnormality or significant change from priors  Salt-and-pepper appearance of the skull is partially accentuated by the image processing /sharpening algorithm, though prior head CT suggests this is a true finding    Correlate for clinical/laboratory features of hyperparathyroidism  Workstation performed: [id number]         XR chest portable   Final Result by Georgina Toledo MD (03/07 1650)      No acute cardiopulmonary disease  Workstation performed: [id number]         CT head wo contrast   Final Result by Ismael Momin MD (03/07 1358)      Evolving right MCA territory infarcts with associated petechial hemorrhage which is best seen on the recent MRI examination  There is no discrete parenchymal hematoma  Workstation performed: [id number]         XR knee 1 or 2 vw left   Final Result by Georgina Toledo MD (03/07 1537)      No acute osseous abnormality  Workstation performed: [id number]             *Labs /Radiology studies reviewed  *Medications reviewed and reconciled as needed  *Please refer to order section for additional ordered labs studies  *Case discussed with primary attending during morning huddle case rounds    Physical Examination:  Vitals:   Vitals:    03/21/23 0830 03/21/23 1700 03/21/23 2132 03/22/23 0514   BP: 92/52 118/62 98/58 94/55   BP Location:  Right arm Right arm Right arm   Pulse: 87 94 87 86   Resp:   16 20   Temp:   (!) 97 4 °F (36 3 °C) 97 6 °F (36 4 °C)   TempSrc:   Oral Oral   SpO2:  90% 93% 94%   Weight:    62 5 kg (137 lb 12 6 oz)   Height:           GEN: No apparent distress, pleasant  NEURO: Alert and oriented x3; no confusion overnight  HEENT: Pupils are equal and reactive, EOMI, mucous membranes are moist, face symmetrical  CV: S1 S2 regular, no MRG, no peripheral edema noted  RESP: Lungs are clear bilaterally, no wheezes, rales or rhonchi noted, on room air, respirations easy and non labored  GI: Flat, soft non tender, non distended; +BS x4  : Voiding without difficulty  MUSC: Moves all extremities; generalized gait dysfunction  SKIN: skin is pale, poor turgor, scattered ecchymosis on b/l upper extremities   hands remain dry with scratch marks      The above physical exam was reviewed and updated as determined by my evaluation of the patient on 3/22/2023  Invasive Devices     None                    VTE Pharmacologic Prophylaxis: Eliquis  Code Status: Level 1 - Full Code  Current Length of Stay: 22 day(s)      Total time spent:  30 minutes  with more than 50% spent counseling/coordinating care  Counseling includes discussion with patient re: progress  and discussion with patient of his/her current medical state/information  Coordination of patient's care was performed in conjunction with primary service  Time invested included review of patient's labs, vitals, and management of their comorbidities with continued monitoring  In addition, this patient was discussed with medical team including physician and advanced extenders  The care of the patient was extensively discussed and appropriate treatment plan was formulated unique for this patient  Medical decision making for the day was made by supervising physician unless otherwise noted in their attestation statement  ** Please Note:  voice to text software may have been used in the creation of this document   Although proof errors in transcription or interpretation are a potential of such software**

## 2023-03-22 NOTE — PROGRESS NOTES
03/22/23 1130   Pain Assessment   Pain Assessment Tool 0-10   Pain Score 7   Pain Location/Orientation Orientation: Right;Location: Groin   Pain Onset/Description Onset: Ongoing   Hospital Pain Intervention(s) Medication (See MAR)   Restrictions/Precautions   Precautions Aspiration;Bed/chair alarms;Cognitive; Fall Risk;Supervision on toilet/commode   Weight Bearing Restrictions No   ROM Restrictions No   Eating   Type of Assistance Needed Set-up / clean-up;Supervision;Verbal cues   Physical Assistance Level No physical assistance   Eating CARE Score 4   Swallow Assessment   Swallow Treatment Assessment Daily Dysphagia Tx Note      Patient Name: Mariah Osman     DFBJL'[id number] Date: 3/22/2023        Current Risks for Dysphagia & Aspiration: Weak voicing; weak cough; dysarthria; general debilitation; new neuro event; brain injury; cognitive deficit; hx neurologic dx; positioning issues     Current Symptoms/Concerns: Cough; throat clear; with liquids; during meals; with pills; difficulty chewing; decreased oral intake; hx of dysphagia/aspiration     Current diet: puree/level 1 diet and nectar thick liquids      Premorbid diet::upon d/c from prior admission, pt appeared to be on dysphagia level 2/thin liquids upon prior admission for stroke initially; unknown prior to admission, pt rx being on softer foods as well as thickened liquids in the past        Positioning: upright in recliner          Items administered:Consistencies Administered: nectar thick, soft solids  Materials administered included mac and cheese, chopped turkey, diced carrots, lemon meringue pie, cream of chicken soup, and NTL by cup      Total amount of meal consumed:   100% pie  3 bites mac and cheese  2 bites turkey  2 bites carrots  240cc NTL         Oral stage:  moderate  Lip closure: functional retrieval to utensil and cup sips   Anterior spillage: mildly present with soft solids and NTL   Mastication: slow, prolonged and decreased manipulation of soft solids  Bolus formation: weaker, uncoordinated at times  Bolus control: suspect decreased posterior control with all consistencies  Transfer: decreased- lingual pumping present  Oral residue: trace between swallows with soft solid options  Pocketing: none          Pharyngeal stage: moderate   Swallow promptness: mild to moderate delayed, at times pt needing cuing to swallow before next bite due to decreased attention  Hyolaryngeal elevation: suspect decreased   Wet voice: none   Throat clear: prompted for protective as well as spontaneous observed with NTL x3 and soft solids x3  Cough: x4 with NTL, x2 with soft solids  Secondary swallows: encouraged double and effortful swallows with liquids and solids  Audible swallows: present with all swallows- more prominent with liquids  Esophageal stage:   Pt with increased s/s present this meal- observed reflux with coughing and anterior spill of previously swallowed food/liquids  Pt provided suction to assist but mainly able to cough and spit out  Summary:     Pt presenting with moderate oropharyngeal dysphagia today  Symptoms or concerns included slow, prolonged, and insufficient chewing of soft solids which lead to decreased breakdown of soft solid textures, decreased bolus propulsion, decreased bolus formation, suspected decreased control of larger sips of NTL, piecemeal deglutition, suspected pharyngeal swallow delay, suspected decreased hyolaryngeal elevation, suspected pharyngeal residue, multiple swallows, effortful swallow, audible swallows and HIGH risk of bottom up aspiration provided prominent episode of refluxed materials being spit/suctioned out anteriorly  Pt seen OOB for meal- motivated to trial level 2 items ordered for meal  Pt initially with increased distractibility needing cues for more focus and implementing of her swallow strategies   Pt with prolonged and decreased oral processing of soft solids with ineffective breakdown "at times (noodles)  Pt with delayed trasnfers, observed with lingual pumping but at times needed cuing to initiate swallow prior to next bite  Swallows weak, decreased, suspect incomplete with retention present  Pt able to throat clear/cough to bring up poorly chewed noodle which was unable to transfer  Pt stated \"these are a little tough\"  Pt preferring softer items with lemon meringue pie and cream of chicken soup  However, pt with prominent episode of reflux where she consumed a few successive bites of soup and then belched/coughed and previously swallowed liquids was required to be spat out and use of suction required  Pt stated \"this happens sometimes, I don't know why\"  Pt with decreased insight/safety as pt wanting to keep drinking NTL and needed SLP to cue her to stop, keep coughing/clearing  Explained esophageal dysphagia s/s as observed on VFSS, therefore pt needed cuing for slower rate and extended time between bites to allow for esophogeal emptying  Observed cough with NTL x3 and x3 with soft solids  Oral care completed by SLP using suction  Pt educated on use of suction and to remain upright for additional 30-45minutes and cough/clear as needed  Recommendations:  Recommended Diet:  puree/level 1 diet and nectar thick liquids by CUP ONLY-NO STRAWS  Recommended Form of Medications: whole with puree   Aspiration precautions and compensatory swallowing strategies: upright posture, only feed when fully alert, slow rate of feeding, small bites/sips, no straws, effortful swallow, cough every 4-5 bites/sips, quiet environment (tv off, limit talking, door closed, etc ), alternating bites and sips, OOB for ALL meals, MUST be AWAKE/ALERT for meals  Oral care with suction w/ after ALL meals  Once oral care was completed, pt is allowed ICE CHIPS between meals  However, pt MUST be FULLY upright when consuming ice chips       FULL supervision w/ meals     Results reviewed with:  patient, nursing   " Aspiration precautions posted  Reflux precautions-->Remain upright at least 30 min post meals     F/u ST tx: Pt is recommended for further skilled SLP services targeting dysphagia therapy in order to maximize oral and pharyngeal swallow skills, while safely supporting PO intake, as well as to improve independent carryover of safe swallow strategies  Plan: Continue puree diet and NTL by cup only            Increase trials of dysphagia level 2 items    Swallow Assessment Prognosis   Prognosis Fair   Prognosis Considerations Age; Co-morbidities; Medical diagnosis; Medical prognosis;Previous level of function;Severity of impairments;New learning ability;Ability to carry over; Cooperation   SLP Therapy Minutes   SLP Time In 1130   SLP Time Out 1230   SLP Total Time (minutes) 60   SLP Mode of treatment - Individual (minutes) 60   SLP Mode of treatment - Concurrent (minutes) 0   SLP Mode of treatment - Group (minutes) 0   SLP Mode of treatment - Co-treat (minutes) 0   SLP Mode of Treatment - Total time(minutes) 60 minutes   SLP Cumulative Minutes 6131   Therapy Time missed   Time missed?  No

## 2023-03-22 NOTE — PROGRESS NOTES
03/22/23 0800   Pain Assessment   Pain Assessment Tool 0-10   Pain Score No Pain   Restrictions/Precautions   Precautions Aspiration;Bed/chair alarms;Cognitive; Fall Risk;Supervision on toilet/commode;Visual deficit   Weight Bearing Restrictions No   ROM Restrictions No   Comprehension   Comprehension (FIM) 4 - Understands basic info/conversation 75-90% of time   Expression   Expression (FIM) 4 - Needs to repeat single words   Social Interaction   Social Interaction (FIM) 5 - Interacts appropriately with others 90% of time   Problem Solving   Problem solving (FIM) 3 - Solves basic problmes 50-74% of time   Memory   Memory (FIM) 4 - Recognizes/recalls/performs 75-89%   Speech/Language/Cognition Assessmetn   Treatment Assessment Pt engaged in skilled speech/cognitve therapy  Pt assisted to bathroom using RW  Pt needed verbal cues for sequencing of hand placement and to maintain proper placement with pushing walker  Once at toilet, completed clothing management with min A and hygiene with supervision  Pt with good sequencing of hand hygiene at sink but needed cues for ambulating around obstacles in room back to chair  One seat, noted pt slightly SOB but able to recover  Pt encouraged to use incentive spirometer as discussed in previous sessions  Pt provided with direct education which pt was able to demonstrate up to 500ml inconsistently, needing cues for slow inhales and maintain good lip closure on device  Pt encouraged to complete 3 good reps at a time as when she keeps going, reps are not productive  Pt completed total of 12reps with cuing averaging 250-500ml  During session, Dr Anderson Maker present to provide news for rehab placement to [facility]  Pt hesitant at first but provided encouragement and explanation of unit and plan for further rehab before going home  Time of discharge TBD today versus tomorrow  Pt next completed cognitive task with word deduction   Pt was able to choose item that did not belong with the others in concrete options with 15/15acc  Plan to cont to target cognitive tasks for increased problem solving, reasoning, insight, and executive functions as well as encourage use of incentive spirometer with speech strategies  Pt will cont to benefit from further skilled SLP services targeting cognitive and speech skills for increased independence and decreased burden of care     Eating   Type of Assistance Needed Set-up / clean-up;Supervision;Verbal cues   Physical Assistance Level No physical assistance   Eating CARE Score 4   Swallow Assessment   Swallow Treatment Assessment Daily Dysphagia Tx Note      Patient Name: Alfredo Henderson     [id number]'E Date: 3/22/2023        Current Risks for Dysphagia & Aspiration: Weak voicing; weak cough; dysarthria; general debilitation; new neuro event; brain injury; cognitive deficit; hx neurologic dx; positioning issues     Current Symptoms/Concerns: Cough; throat clear; with liquids; during meals; with pills; difficulty chewing; decreased oral intake; hx of dysphagia/aspiration     Current diet: puree/level 1 diet and nectar thick liquids      Premorbid diet::upon d/c from prior admission, pt appeared to be on dysphagia level 2/thin liquids upon prior admission for stroke initially; unknown prior to admission, pt rx being on softer foods as well as thickened liquids in the past        Positioning: upright in recliner          Items administered:Consistencies Administered: nectar thick, soft solids  Materials administered included cheerios with NTL milk, NTL by cup      Total amount of meal consumed:   50% cheerios  240cc NTL         Oral stage: mild to moderate  Lip closure: functional retrieval to utensil and cup sips   Anterior spillage: mildly present with NTL milk while chewing cereal  Mastication: slow manipulation but functional breakdown achieved  Bolus formation: weaker, uncoordinated at times  Bolus control: suspect decreased posterior control with all "consistencies  Transfer: decreased- lingual pumping present   Oral residue: trace between swallows with soft solid options  Pocketing: none          Pharyngeal stage: moderate   Swallow promptness: inconsistent with delay and promptness  Hyolaryngeal elevation: suspect decreased   Wet voice: none   Throat clear: when prompted for protective means  Cough: x1 with NTL, x1 post burp/hiccup  Secondary swallows: encouraged double and effortful swallows with liquids and solids  Audible swallows: present with all swallow- more prominent with liquids  Esophageal stage:   Pt with hiccup and belching as well as throat clear afterwards  Summary:     Pt presenting with mild to moderate oropharyngeal dysphagia today  Symptoms or concerns included slow and prolonged chewing of soft solids, decreased bolus propulsion, decreased bolus formation, suspected decreased control of larger sips of NTL, piecemeal deglutition, suspected pharyngeal swallow delay, suspected decreased hyolaryngeal elevation, suspected pharyngeal residue, multiple swallows, effortful swallow, audible swallows and strong cough response post NTL cup sips  Pt seen OOB for meal- voicing some nausea but agreeable and motivated for soft solid trials  Pt assessed with soften cheerios with NTL milk  Pt with good retrieval by utensil but decreased control as pt with loss of milk from left side down to chin needed cues to attend and wipe  Pt with good carryover of wiping mouth after bites moving forward  Oral processing of soft solids slow, prolonged and effortful, but functional breakdown achieved  Transfers and swallows were piecemeal, trace retention between swallows with no overt pocketing  Pt encouraged double swallows with textures as well as to alternate with NTL by cup  Pt sipped liquids with good retrieval and control, fluctuating in promptness from delayed to timely   Swallows audible \"gulps\" and pt encouraged double swallows to encourage " increased clearance of suspected retention  Pt with delayed cough x1 with NTL as well as cough post belch/hiccup from suspect bottom up reflux  No further s/s present  Discussed with pt plan to complete additional session with lunch- pt chose items of preference from level 2 diet options  Recommendations:  Recommended Diet:  puree/level 1 diet and nectar thick liquids by CUP ONLY-NO STRAWS  Recommended Form of Medications: whole with puree   Aspiration precautions and compensatory swallowing strategies: upright posture, only feed when fully alert, slow rate of feeding, small bites/sips, no straws, effortful swallow, cough every 4-5 bites/sips, quiet environment (tv off, limit talking, door closed, etc ), alternating bites and sips, OOB for ALL meals, MUST be AWAKE/ALERT for meals  Oral care with suction w/ after ALL meals  Once oral care was completed, pt is allowed ICE CHIPS between meals  However, pt MUST be FULLY upright when consuming ice chips  FULL supervision w/ meals     Results reviewed with:  patient, nursing     Aspiration precautions posted  Reflux precautions-->Remain upright at least 30 min post meals     F/u ST tx: Pt is recommended for further skilled SLP services targeting dysphagia therapy in order to maximize oral and pharyngeal swallow skills, while safely supporting PO intake, as well as to improve independent carryover of safe swallow strategies  Plan: Continue puree diet and NTL by cup only            Increase trials of dysphagia level 2 items    Swallow Assessment Prognosis   Prognosis Fair   Prognosis Considerations Age; Co-morbidities; Medical diagnosis; Medical prognosis;Previous level of function;Severity of impairments;New learning ability;Ability to carry over; Cooperation   SLP Therapy Minutes   SLP Time In 0800   SLP Time Out 0930   SLP Total Time (minutes) 90   SLP Mode of treatment - Individual (minutes) 90   SLP Mode of treatment - Concurrent (minutes) 0   SLP Mode of treatment - Group (minutes) 0   SLP Mode of treatment - Co-treat (minutes) 0   SLP Mode of Treatment - Total time(minutes) 90 minutes   SLP Cumulative Minutes 1095   Therapy Time missed   Time missed?  No

## 2023-03-22 NOTE — PROGRESS NOTES
PM&R PROGRESS NOTE:  Diogo Albert 75 y o  female MRN: [id number]  Unit/Bed#: -01 Encounter: [id number]      Rehabilitation Diagnosis: Impairment of mobility, safety, Activities of Daily Living (ADLs), and cognitive/communication skills due to Stroke:  01 1  Left Body Involvement (Right Brain)    HPI: Diogo Albert is a 75 y o  female with history of asthma, anxiety, bipolar depression, chronic narcotic use, previous enteroviral infection of the heart, seronegative arthropathy of multiple joints, undifferentiated connective tissue disorder, obstructive sleep apnea on home CPAP who presented to the [address] [place] [address] on 2/22 for altered mental status  According to the patient's daughter on chart review, the patient's mental status had been declining for the prior 48 hours to admission  She did receive Narcan which improved her mental status slightly  Of note she had a recent stroke with revascularization on 2/12/2023 with Dr Lexie Gold  She was seen by neurology with overall good strength except mildly weak in the lower extremities with brisk reflexes on the left and a positive Haroon's  At this time is felt to be potentially related to polypharmacy in the setting of chronic pain meds versus seizure in the setting of a recent stroke  She was initiated on 81 mg of aspirin daily with a plan of potentially transitioning to Eliquis, plan for EEG  CT of the head was negative  MRI was completed showing a new small acute/recent interval lacunar infarction in the right cerebral pedun there is jacinta  She was started on Eliquis  Nephrology was consulted as well for hypercalcemia  The patient was evaluated by the Rehabilitation team and deemed an appropriate candidate for comprehensive inpatient rehabilitation and admitted to the [facility] on 2/28/2023  3:41 PM patient was seen in room with brother at bedside and all questions regarding the acute inpatient rehabilitation program were answered  We also discussed at length course in the hospital and her last hospitalization to verify events and clarify any imaging or laboratory findings  Patient is eager to start her therapy program       SUBJECTIVE: Patient seen and evaluated in room and during speech therapy session  Making some progress and continues to work on trials of level 2 diets  She has no specific complaints but does have some pain and is asking if she can get her pain medications for her back and this is not new type of pain for her  She denies any fever, chills, nausea, vomiting, cough, shortness of breath, diarrhea, constipation  She is sleeping relatively well and fully participating in therapy  Her mental status has been stable for the last week with all of the changes including the addition of Sensipar and reduction of her opioids  Mood is stable  ASSESSMENT: Stable, progressing    PLAN:    Rehabilitation  • Functional deficits:  Self care and mobility  • Continue current rehabilitation plan of care to maximize function  • Functional update:   Physical Therapy Occupational Therapy Speech Therapy   Weight Bearing Status: Full Weight Bearing  Transfers: Incidental Touching, Minimal Assistance  Bed Mobility: Incidental Touching  Amulation Distance (ft): 100 feet  Ambulation: Incidental Touching  Assistive Device for Ambulation: Roller Walker  Wheelchair Mobility Distance: 100 ft  Wheelchair Mobility: Supervision  Number of Stairs: 6  Assistive Device for Stairs: Bilateral Hand Rails  Stair Assistance: Incidental Touching  Discharge Recommendations: Skilled Nursing Facility   Eating: Supervision  Grooming: Minimal Assistance  Bathing: Moderate Assistance  Bathing: Moderate Assistance  Upper Body Dressing: Moderate Assistance  Lower Body Dressing: Moderate Assistance  Toileting: Moderate Assistance  Tub/Shower Transfer:  Moderate Assistance  Toilet Transfer: Minimal Assistance  Cognition: Exceptions to WNL  Cognition: Decreased Memory, Decreased Executive Functions, Decreased Attention, Decreased Safety  Orientation: Person, Place, Time, Situation   Mode of Communication: Verbal  Speech/Language: Dysarthia  Cognition: Exceptions to WNL  Cognition: Decreased Memory, Decreased Executive Functions, Decreased Attention, Decreased Comprehension, Decreased Safety  Orientation: Person, Place, Time, Situation  Swallowing: Exceptions to WNL  Swallowing: Oral Dysphagia, Pharyngeal Dysphagia, Aspiration Risk  Diet Recommendations: Level 1/Puree, [place] Thick  Discharge Recommendations:  (pending progress)  DC Home with[de-identified] 24 Hour Supervision, 24 Hour Assisteance, Family Support, Home Speech Therapy, Outpatient Speech Therapy       • Estimated Discharge: Transition to subacute rehabilitation    DVT prophylaxis  • On Eliquis     Pain  • Cymbalta 60 mg twice daily  • Gabapentin 100 mg twice daily  • Morphine 7 5 mg 4 times a day as needed  • Acetaminophen 650 mg every 6 hours as needed     Bladder plan  • Continent     Bowel plan  • Continent  • Last bowel movement 3/20     Code Status  • Level 1 full code      * Acute CVA (cerebrovascular accident) [facility])  Assessment & Plan  3/8 - neuro exam stable - continue to monitor closely   CT head 3/7 - Evolving right MCA territory infarcts with associated petechial hemorrhage which is best seen on the recent MRI examination  There is no discrete parenchymal hematoma  - discussed above with radiologist Della - no acute bleeding noted   MRI brain 2/22 - 1  New small acute /more recent interval lacunar infarction in the right cerebral peduncle (series 4, image 14)  2   Extensive multifocal large, previously present right middle cerebral artery infarction which is evolving with superimposed areas of evolving hemorrhage likely related to hemorrhagic transformation of subacute right MCA infarction  No significant   mass effect  3   Mild, chronic microangiopathy    S/P thrombectomy 2/12 by Dr Allison Petit   Currently on a dysphagia 1 diet with thin liquids  Secondary stroke prophylaxis at this time with Eliquis 5 mg twice daily (aspirin d/c'd earlier); and statin; optimal BP control   PT, OT, SLP  Consideration for neuropsychology if patient agreeable  Review modifiable risk factors and provide stroke education  Monitor for poststroke pain  Close neuro follow-up as patient has had multiple strokes in the last 2 months    Hypokalemia  Assessment & Plan  Hypokalemia with potassium down to 3 4 given 40 mill equivalents today on 3/10 but IV infiltrated  Able to get PO dose over weekend  Potasium now WNL  Recheck BMP as of 3/20 up to 3 7 and stable    Wound of left ankle  Assessment & Plan  On lateral ankle possibly occurred at time of fall, reviewed wound getting SilvaSorb examined today on 3/15 with nursing  - Wound care c/s  1  Cleanse L lateral ankle wound with NSS, pat dry, and apply silvasorb gel to the wound bed  Cover with Allevyn foam dressing  Isacc dressing with T  Change every day and as needed for soilage/dislodgement  2  Preventative nursing skin care orders placed  3   Pressure relief, turning and repositioning with foam offloading wedges and waffle cushion to the care  Prevalon boot     Dysphagia  Assessment & Plan  - Current diet: Modified - D1, NTL after failed VBS, GI consulted, plan for esophogram, protonix added  - Recommend VBS study-VBS completed on 3/9 with evidence for esophageal dysmotility and dysphagia recommending puréed with nectar thick, okay for ice chips  - SLP evaluate and treat decline in swallowing   - Aspiration precautions; HOB completely upright when eating, 100% supervision, monitor for pocketing  - Hold oral intake if patient too confused or lethargic and notify MD   - Oral care with meals and bedtime  - Ensure adequate hydration  - Nutrition consult to assist with management   - Rehab nursing and staff to ensure safe and appropriate feeding to decrease risk of aspiration     - Rehab physician oversight dysphagia management and risks associated with it  - Monitor for appropriate fluid and nutrition intake      Gout of foot  Assessment & Plan  Much improved on prednisone  Uric acid wnl   R 1st MTPJ redness and focal tenderness without significant edema or skin breakdown   - IM feels most c/w gout and have ordered prednisone - taper thru 3/12  - Monitor for improvement or non-improvement and other etiologies as well closely     AMS (altered mental status)  Assessment & Plan  Mentation improving as of 3/13 and more consistently after reduction in opioids as well as addition of Sensipar decreasing her overall calcium levels- Continues to be stable as of 3/17  3/7 Patient with some sleepiness at times apparently last several days with poor activity tolerance and now s/p fall - appears back to recent baseline on follow-up eval   CT head 3/7 - Evolving right MCA territory infarcts with associated petechial hemorrhage which is best seen on the recent MRI examination  There is no discrete parenchymal hematoma   - discussed above with johny Hull - no acute bleeding noted   - Ensure adequate nutrition and hydration with altered diet and sub-optimal intake BUN/Cr acceptable   - Continue Eliquis, monitor vitals scout  - Comgmt with IM team   - Patient afebrile; CBC wnl; LFTs wnl; Ammonia wnl  - Continue Abx of UTI   - Calcium elevated > optimal mgmt per endo > adding Sensipar   - Hx of SHAR - continue 2L at night O2 with spot checks - refusing CPAP - obtain noct ox on 2L   - Hold standing long-acting MS Contin 15mg Q12H and transitioned to MS IR 7 5mg Q6H PRN   - Gabapentin 100mg BID weaned down recently            UTI (urinary tract infection)  Assessment & Plan  UA last week with culture growing greater than 100,000 CFU Morganella and Serratia  Started on Ciprofloxacin but transitioned to Cefpodixime per prior providers with plan to continue into 3/10   Dysuria improving     History of stroke  Assessment & Plan  Patient with recent admission in early 2023 for right MCA CVA with petechial hemorrhage and residual left-sided weakness and dysarthria  Patient had mechanical thrombectomy with Dr Reynoso [facility] on 2/12    Obstructive sleep apnea  Assessment & Plan  Patient refusing CPAP 2/2 claustrophobia - refuses home or hospital CPAP   2L O2 at night with spot checks   Obtain noct ox on 2L; repeat noct ox prior to d/c for possible home DME     History of bacteremia  Assessment & Plan  Unclear etiology initially of whether her altered mental status was related to stroke versus polypharmacy versus opioid overdose versus infection versus seizure  1/2 micrococcus with a second set staph coag negative and followed by infectious disease after course of antibiotic treatment  Has been off of antibiotics and repeat blood cultures negative to date, ID felt may be contaminant    Discoloration of skin of foot  Assessment & Plan  N/V intact - no significant discoloration today   Per daughter and patient patient's foot will become blue or red and change colors frequently  No evidence for infectious etiology and had a lower extremity arterial duplex with no evidence of any significant arterial occlusive disease  May be related to the seronegative spondyloarthropathies  Likely the source of of the inaccurate and inconsistent pulse oximetry readings in the hands as well    Hypercalcemia  Assessment & Plan  Nephrology had been following, nonsuppressed PTH with concerns for primary hyperparathyroidism - they defer to endo on mgmt   C spine XR 3/7 - Salt-and-pepper appearance of the skull is partially accentuated by the image processing /sharpening algorithm, though prior head CT suggests this is a true finding  Correlate for clinical/laboratory features of hyperparathyroidism; Salt-and-pepper appearance of the skull accentuated by image processing/sharpening algorithm    However, prior head CT shows a mottled appearance of the skull as well "suggesting hyperparathyroidism  No focal lytic or blastic lesions  No acute fractures  Endocrine consult   \"SPEP-no monoclonal bands, UPEP- no monoclonal bands, PTH RP less than 2, 25-hydroxy vitamin D 41 7, 1,25 hydroxy vitamin D 23 7 low, Phos 2 5 low, PTH 65 4   Assessment/Plan:  1  Hypercalcemia: Suspected to be on the basis of primary hyperparathyroidism: Corrected calcium continues to increase slightly  Sensipar 30 mg daily was started and is being tolerated  Given history of osteoporosis, if appropriate surgical candidate consider surgery referral as outpatient  Follow-up with endocrinology as outpatient  Monitor corrected calcium level  Adjust dose as needed  Status post a CT of the chest abdomen and pelvis with no overt malignancy  - Patient has allergy to contrast - discussed with endo fellow  -Calcium as of 3/20 continues to come down with the use of Sensipar  Most recently down to 8 8 from a peak of 12      HTN (hypertension)  Assessment & Plan  Patient with some orthostasis  IM consulted and with overall management at their discretion during ARC course  Monitor orthostatics; PRN ADDY hose, abdominal binder - ensure adequate hydration   Currently on metoprolol succinate 25 mg twice daily with holding parameters   Plan per cardiology did not add an ARB in the setting of heart failure however as blood pressure allows    Bipolar depression ([facility] )  Assessment & Plan  Currently on Wellbutrin 100 mg twice daily, Lamictal 150 mg nightly, and Cymbalta 60 mg twice daily  Consistent with home regimen  Continue monitoring for stability, mood lability  Consider neuropsychology consultation if patient agreeable    Fall  Assessment & Plan  3/7 early morning - per report \"unwitnessed FALL; Per RN patient set off bed alarm, but has already fallen onto her left side  Confused and wanting to wash her face  Once more awake, mentation improved  \"  - CTH evolving infarct without new bleeding per rads  - L knee mild " pain but good ROM without significant edema or instability > improving - XR unremarkable - monitor   - Mild L hip pain but good ROM and can adequately WB > improving - L hip/pelvis XR - mild hip OA without acute findings -  monitor  - Some L shoulder pain - some mildly decreased AROM, +Muñoz > stable -  L shoulder XR - unremarkable - monitor    - Subtle neck pain but full ROM without radicular complaints or focal weakness, sensory changes > improving - C spine XR - no acute findings or change from prior - chronic changes > Chronic hyperkyphosis  Anterolisthesis at C4-5 is unchanged  No acute subluxation; Salt-and-pepper appearance of the skull accentuated by image processing/sharpening algorithm  Multilevel moderate to severe discogenic and uncovertebral degenerative changes greatest at C5-6 and C6-7  Multilevel mild facet degenerative changes greatest at C4-5  > monitor   - Optimal mgmt with wakefulness/AMS - continue 2L at night O2 with spot checks - refusing CPAP  - Has been demonstrating use of call bell and not getting up overnight without assistance  After team discussion, plan for discontinuing observation  Cervical spondylosis  Assessment & Plan  C spine XR 3/7 - Chronic hyperkyphosis  Anterolisthesis at C4-5 is unchanged  No acute subluxation; Salt-and-pepper appearance of the skull accentuated by image processing/sharpening algorithm  However, prior head CT shows a mottled appearance of the skull as well suggesting hyperparathyroidism  No focal lytic or blastic lesions  No acute fractures  Multilevel moderate to severe discogenic and uncovertebral degenerative changes greatest at C5-6 and C6-7    Multilevel mild facet degenerative changes greatest at C4-5   - No signs of myelopathy or radiculopathy - monitor     Osteoporosis  Assessment & Plan  History of an L1 compression fracture status post kyphoplasty  Was previously on Prolia has not been on since 2020  Outpatient referral to endocrine    CHF (congestive heart failure) ([facility])  Assessment & Plan  Wt Readings from Last 3 Encounters:   03/22/23 62 5 kg (137 lb 12 6 oz)   02/16/23 62 6 kg (138 lb)   02/12/23 67 9 kg (149 lb 11 1 oz)       Cardiomyopathy with reduced ejection fraction as low as 10 to 15% back in 2004 but has improved since and most recently was 25 to 30% on last admission  CXR 3/7 unremarkable; does not appear volume overloaded   Patient was offered LifeVest but declined  On metoprolol succinate 25 mg twice daily, with plan to add an ARB as her blood pressure would allow  Strict I's and O's and weights to be checked every Monday Wednesday Friday  Follows with Dr Jayashree Sarmiento at [facility]      Seronegative arthropathy of multiple sites [facility])  [address] with rheumatology and is on Plaquenil 200 mg Monday through Friday and 400 mg on Saturday Sunday  Continue home regimen and follow-up with rheumatology on discharge    Asthma  Assessment & Plan  3/8 saturating well on RA/2L   3/7 afternoon - patient had questionable desat with therapy but had full improved mentation and was in no resp or other distress at that time  - Comgmt with IM who also evaluated patient at this time    - She historically has had difficulty obtaining O2 from finger tips at times - when pulse ox moved to toe she had good oxygenation on RA-2L  - CXR no acute findings - bibasilar scarring   - Continue use of albuterol inhaler 2 puffs every 6 hours as needed    Chronic pain syndrome  Assessment & Plan  History of chronic pain secondary to compression fractures in her thoracic spine and chronic rib fractures   Patient with over sleepiness at times at sedating meds decreased some recently   · Holding standing long-acting MS Contin 15mg Q12H and transitioned to MS IR 7 5mg Q6H PRN > pain still adequately controlled > monitor closely   · Gabapentin 100mg BID   · Cymbalta 60 milligrams twice daily  · Acetaminophen 650 mg every 6 hours as "needed  · [facility] but per family due to concerns for polypharmacy and additional opioids beyond her morphine has not been on an acute care since 2/16  · Lidoderm and Bengay  Aqua K also ordered with instructions not to place over same area  Appreciate IM consultants medical co-management  Labs, medications, and imaging personally reviewed  ROS:  A ten point review of systems was completed on 03/22/23 and pertinent positives are listed in subjective section  All other systems reviewed were negative  OBJECTIVE:   /78   Pulse 82   Temp 97 6 °F (36 4 °C) (Oral)   Resp 20   Ht 5' 3\" (1 6 m)   Wt 62 5 kg (137 lb 12 6 oz)   SpO2 94%   BMI 24 41 kg/m²     Physical Exam  Vitals and nursing note reviewed  Constitutional:       General: She is not in acute distress  Appearance: Normal appearance  HENT:      Head: Atraumatic  Comments: Left-sided facial droop     Right Ear: External ear normal       Left Ear: External ear normal       Nose: Nose normal  No rhinorrhea  Mouth/Throat:      Mouth: Mucous membranes are moist       Pharynx: Oropharynx is clear  Eyes:      General: No scleral icterus  Cardiovascular:      Rate and Rhythm: Normal rate  Pulses: Normal pulses  Heart sounds: Normal heart sounds  Pulmonary:      Effort: Pulmonary effort is normal  No respiratory distress  Breath sounds: Normal breath sounds  Abdominal:      General: There is no distension  Palpations: Abdomen is soft  Musculoskeletal:      Right lower leg: No edema  Left lower leg: No edema  Skin:     General: Skin is warm and dry  Coloration: Skin is pale  Neurological:      Mental Status: She is oriented to person, place, and time  Sensory: No sensory deficit        Comments: Dysarthria and left-sided weakness affecting more distal than proximal muscles of 4/5 and affecting the upper limb over the lower " limb more significantly   Psychiatric:         Mood and Affect: Mood normal          Behavior: Behavior normal           Lab Results   Component Value Date    WBC 7 43 03/20/2023    HGB 12 4 03/20/2023    HCT 39 0 03/20/2023    MCV 99 (H) 03/20/2023     03/20/2023     Lab Results   Component Value Date    SODIUM 143 03/20/2023    K 3 7 03/20/2023     (H) 03/20/2023    CO2 28 03/20/2023    BUN 13 03/20/2023    CREATININE 0 82 03/20/2023    GLUC 88 03/20/2023    CALCIUM 8 8 03/20/2023     Lab Results   Component Value Date    INR 0 95 02/22/2023    INR 0 90 02/12/2023    INR 1 12 04/22/2022    PROTIME 12 8 02/22/2023    PROTIME 12 1 02/12/2023    PROTIME 14 0 04/22/2022           Current Facility-Administered Medications:   •  acetaminophen (TYLENOL) tablet 650 mg, 650 mg, Oral, Q6H PRN, Rosa Loges, DO, 650 mg at 03/18/23 7786  •  albuterol (PROVENTIL HFA,VENTOLIN HFA) inhaler 2 puff, 2 puff, Inhalation, Q6H PRN, Rosa Loges, DO, 2 puff at 03/07/23 1358  •  aluminum-magnesium hydroxide-simethicone (MYLANTA) oral suspension 30 mL, 30 mL, Oral, Q4H PRN, Rosa Loges, DO, 30 mL at 03/21/23 1726  •  apixaban (ELIQUIS) tablet 5 mg, 5 mg, Oral, BID, Rosa Loges, DO, 5 mg at 03/22/23 2154  •  atorvastatin (LIPITOR) tablet 40 mg, 40 mg, Oral, QPM, Rosa Loges, DO, 40 mg at 03/21/23 1726  •  bisacodyl (DULCOLAX) rectal suppository 10 mg, 10 mg, Rectal, Once, Tamra Munoz MD  •  buPROPion [facility]) tablet 100 mg, 100 mg, Oral, BID, Rosa Loges, DO, 100 mg at 03/22/23 8194  •  cinacalcet (SENSIPAR) tablet 30 mg, 30 mg, Oral, Daily With Breakfast, Anika Renee MD, 30 mg at 03/22/23 2148  •  DULoxetine (CYMBALTA) delayed release capsule 60 mg, 60 mg, Oral, BID, Rosa Mcdowell DO, 60 mg at 03/22/23 3298  •  gabapentin (NEURONTIN) capsule 100 mg, 100 mg, Oral, BID, Tamra Munoz MD, 100 mg at 03/22/23 1771  •  glycerin-hypromellose- (ARTIFICIAL TEARS) ophthalmic solution 1 drop, 1 drop, Both Eyes, Q4H PRN, Kat Núñez DO, 1 drop at 03/20/23 1832  •  hydroxychloroquine (PLAQUENIL) tablet 200 mg, 200 mg, Oral, Once per day on Mon Tue Wed Thu Fri, Carloz DELEON Evert Moel, DO, 200 mg at 03/22/23 6442  •  hydroxychloroquine (PLAQUENIL) tablet 400 mg, 400 mg, Oral, Once per day on Sun Sat, Carloz Parnell, DO, 400 mg at 03/19/23 5043  •  lamoTRIgine (LaMICtal) tablet 150 mg, 150 mg, Oral, HS, Kat Núñez, DO, 150 mg at 03/21/23 2049  •  lidocaine (LIDODERM) 5 % patch 1 patch, 1 patch, Topical, Daily, Radha Walden MD, 1 patch at 03/22/23 9293  •  LORazepam (ATIVAN) tablet 0 25 mg, 0 25 mg, Oral, BID PRN, Michele Roman MD, 0 25 mg at 03/21/23 2215  •  melatonin tablet 3 mg, 3 mg, Oral, HS, LAURA Rutledge, 3 mg at 03/21/23 2049  •  menthol-methyl salicylate (BENGAY) 64-50 % cream, , Apply externally, 4x Daily PRN, Radha Walden MD, Given at 03/06/23 1227  •  metoprolol succinate (TOPROL-XL) 24 hr tablet 25 mg, 25 mg, Oral, BID, LAURA Garcia, 25 mg at 03/22/23 2926  •  morphine (MSIR) IR tablet 7 5 mg, 7 5 mg, Oral, Q6H PRN, Michele Roman MD, 7 5 mg at 03/22/23 1152  •  ondansetron (ZOFRAN-ODT) dispersible tablet 4 mg, 4 mg, Oral, Q6H PRN, Kat Núñez DO, 4 mg at 03/22/23 4575  •  pantoprazole (PROTONIX) EC tablet 40 mg, 40 mg, Oral, Early Morning, Shlomo Colon MD, 40 mg at 03/22/23 0701  •  polyethylene glycol (MIRALAX) packet 17 g, 17 g, Oral, Daily PRN, Kat Núñez DO, 17 g at 03/04/23 1930  •  prazosin (MINIPRESS) capsule 1 mg, 1 mg, Oral, HS, Kat Núñez DO, 1 mg at 03/15/23 2110  •  senna-docusate sodium (SENOKOT S) 8 6-50 mg per tablet 1 tablet, 1 tablet, Oral, BID, Kat Núñez DO, 1 tablet at 03/22/23 6889  •  sodium chloride (OCEAN) 0 65 % nasal spray 1 spray, 1 spray, Each Nare, Q1H PRN, Bigg Mg MD, 1 spray at 03/08/23 0300  •  white petrolatum-mineral oil (EUCERIN,HYDROCERIN) cream, , Topical, TID, Bigg Mg MD, Given at 03/22/23 0841    Past Medical History:   Diagnosis Date   • Anxiety    • Asthma    • Bipolar depression ([facility] )    • Chronic narcotic dependence ([facility] )    • Chronic pain    • Depression    • Enterovirus heart infection    • SHAR (obstructive sleep apnea)    • Osteoarthritis    • Peripheral neuropathy    • Plantar fasciitis of right foot    • Senile osteoporosis    • Seronegative arthropathy of multiple sites [facility])    • Undifferentiated connective tissue disease ([facility] )        Patient Active Problem List    Diagnosis Date Noted   • Acute CVA (cerebrovascular accident) ([facility] ) 02/23/2023   • Hypokalemia 03/10/2023   • AMS (altered mental status) 03/09/2023   • Gout of foot 03/09/2023   • Dysphagia 03/09/2023   • Wound of left ankle 03/09/2023   • UTI (urinary tract infection) 03/06/2023   • Obstructive sleep apnea 02/28/2023   • History of stroke 02/28/2023   • History of bacteremia 02/24/2023   • Hypercalcemia 02/22/2023   • Discoloration of skin of foot 02/22/2023   • Abdominal pain 02/22/2023   • HFrEF (heart failure with reduced ejection fraction) ([facility] ) 02/13/2023   • Stroke ([place] [address] ) 02/12/2023   • HTN (hypertension) 02/12/2023   • Cardiomyopathy ([facility] ) 02/07/2023   • Vitamin D insufficiency 02/07/2023   • LAILA (acute kidney injury) ([place] [address] ) 04/23/2022   • Polypharmacy 04/23/2022   • 'light-for-dates' infant with signs of fetal malnutrition 04/22/2022   • Bipolar depression ([facility] ) 03/04/2022   • Rheumatoid arthritis ([facility] ) 03/04/2022   • Closed fracture of multiple ribs of right side 03/02/2022   • Traumatic pneumothorax 03/02/2022   • Closed fracture of transverse process of lumbar vertebra ([facility] ) 03/02/2022   • Liver contusion 03/02/2022   • Fall 03/02/2022   • Acute pain due to trauma 03/02/2022   • Right shoulder pain 03/02/2022   • Severe protein-calorie malnutrition ([facility] ) 03/02/2022   • Undifferentiated connective tissue disease ([facility] ) 12/29/2021   • Primary generalized (osteo)arthritis 12/29/2021   • Peripheral neuropathy 12/29/2021   • Osteoporosis 12/29/2021   • Hx of compression fracture of spine 12/29/2021   • Lumbar spondylosis 12/29/2021   • Cervical spondylosis 12/29/2021   • Anemia 06/26/2021   • Chronic pain syndrome    • Asthma    • Ambulatory dysfunction    • Seronegative arthropathy of multiple sites (HCC)    • Gastroesophageal reflux disease without esophagitis    • Mixed stress and urge urinary incontinence    • CHF (congestive heart failure) ([facility] )         Royce Smith DO  Physical Medicine and Priyanka Rice    I have spent a total time of 35 minutes on 03/22/23 in caring for this patient including Instructions for management, Patient and family education, Importance of tx compliance, Counseling / Coordination of care, Documenting in the medical record and Communicating with other healthcare professionals

## 2023-03-22 NOTE — PLAN OF CARE
Problem: PAIN - ADULT  Goal: Verbalizes/displays adequate comfort level or baseline comfort level  Description: Interventions:  - Encourage patient to monitor pain and request assistance  - Assess pain using appropriate pain scale  - Administer analgesics based on type and severity of pain and evaluate response  - Implement non-pharmacological measures as appropriate and evaluate response  - Consider cultural and social influences on pain and pain management  - Notify physician/advanced practitioner if interventions unsuccessful or patient reports new pain  Outcome: Progressing     Problem: INFECTION - ADULT  Goal: Absence or prevention of progression during hospitalization  Description: INTERVENTIONS:  - Assess and monitor for signs and symptoms of infection  - Monitor lab/diagnostic results  - Monitor all insertion sites, i e  indwelling lines, tubes, and drains  - Monitor endotracheal if appropriate and nasal secretions for changes in amount and color  - Meredith appropriate cooling/warming therapies per order  - Administer medications as ordered  - Instruct and encourage patient and family to use good hand hygiene technique  - Identify and instruct in appropriate isolation precautions for identified infection/condition  Outcome: Progressing  Goal: Absence of fever/infection during neutropenic period  Description: INTERVENTIONS:  - Monitor WBC    Outcome: Progressing     Problem: SAFETY ADULT  Goal: Patient will remain free of falls  Description: INTERVENTIONS:  - Educate patient/family on patient safety including physical limitations  - Instruct patient to call for assistance with activity   - Consult OT/PT to assist with strengthening/mobility   - Keep Call bell within reach  - Keep bed low and locked with side rails adjusted as appropriate  - Keep care items and personal belongings within reach  - Initiate and maintain comfort rounds  - Make Fall Risk Sign visible to staff  - Apply yellow socks and bracelet for high fall risk patients  - Consider moving patient to room near nurses station  Outcome: Progressing  Goal: Maintain or return to baseline ADL function  Description: INTERVENTIONS:  -  Assess patient's ability to carry out ADLs; assess patient's baseline for ADL function and identify physical deficits which impact ability to perform ADLs (bathing, care of mouth/teeth, toileting, grooming, dressing, etc )  - Assess/evaluate cause of self-care deficits   - Assess range of motion  - Assess patient's mobility; develop plan if impaired  - Assess patient's need for assistive devices and provide as appropriate  - Encourage maximum independence but intervene and supervise when necessary  - Involve family in performance of ADLs  - Assess for home care needs following discharge   - Consider OT consult to assist with ADL evaluation and planning for discharge  - Provide patient education as appropriate  Outcome: Progressing  Goal: Maintains/Returns to pre admission functional level  Description: INTERVENTIONS:  - Perform BMAT or MOVE assessment daily    - Set and communicate daily mobility goal to care team and patient/family/caregiver     - Collaborate with rehabilitation services on mobility goals if consulted  - Out of bed for toileting  - Record patient progress and toleration of activity level   Outcome: Progressing     Problem: DISCHARGE PLANNING  Goal: Discharge to home or other facility with appropriate resources  Description: INTERVENTIONS:  - Identify barriers to discharge w/patient and caregiver  - Arrange for needed discharge resources and transportation as appropriate  - Identify discharge learning needs (meds, wound care, etc )  - Arrange for interpretive services to assist at discharge as needed  - Refer to Case Management Department for coordinating discharge planning if the patient needs post-hospital services based on physician/advanced practitioner order or complex needs related to functional status, cognitive ability, or social support system  Outcome: Progressing     Problem: Nutrition/Hydration-ADULT  Goal: Nutrient/Hydration intake appropriate for improving, restoring or maintaining nutritional needs  Description: Monitor and assess patient's nutrition/hydration status for malnutrition  Collaborate with interdisciplinary team and initiate plan and interventions as ordered  Monitor patient's weight and dietary intake as ordered or per policy  Utilize nutrition screening tool and intervene as necessary  Determine patient's food preferences and provide high-protein, high-caloric foods as appropriate       INTERVENTIONS:  - Monitor oral intake, urinary output, labs, and treatment plans  - Assess nutrition and hydration status and recommend course of action  - Evaluate amount of meals eaten  - Assist patient with eating if necessary   - Allow adequate time for meals  - Recommend/ encourage appropriate diets, oral nutritional supplements, and vitamin/mineral supplements  - Order, calculate, and assess calorie counts as needed  - Recommend, monitor, and adjust tube feedings and TPN/PPN based on assessed needs  - Assess need for intravenous fluids  - Provide specific nutrition/hydration education as appropriate  - Include patient/family/caregiver in decisions related to nutrition  Outcome: Progressing     Problem: Prexisting or High Potential for Compromised Skin Integrity  Goal: Skin integrity is maintained or improved  Description: INTERVENTIONS:  - Identify patients at risk for skin breakdown  - Assess and monitor skin integrity  - Assess and monitor nutrition and hydration status  - Monitor labs   - Assess for incontinence   - Turn and reposition patient  - Assist with mobility/ambulation  - Relieve pressure over bony prominences  - Avoid friction and shearing  - Provide appropriate hygiene as needed including keeping skin clean and dry  - Evaluate need for skin moisturizer/barrier cream  - Collaborate with interdisciplinary team   - Patient/family teaching  - Consider wound care consult   Outcome: Progressing     Problem: MOBILITY - ADULT  Goal: Maintain or return to baseline ADL function  Description: INTERVENTIONS:  -  Assess patient's ability to carry out ADLs; assess patient's baseline for ADL function and identify physical deficits which impact ability to perform ADLs (bathing, care of mouth/teeth, toileting, grooming, dressing, etc )  - Assess/evaluate cause of self-care deficits   - Assess range of motion  - Assess patient's mobility; develop plan if impaired  - Assess patient's need for assistive devices and provide as appropriate  - Encourage maximum independence but intervene and supervise when necessary  - Involve family in performance of ADLs  - Assess for home care needs following discharge   - Consider OT consult to assist with ADL evaluation and planning for discharge  - Provide patient education as appropriate  Outcome: Progressing  Goal: Maintains/Returns to pre admission functional level  Description: INTERVENTIONS:  - Perform BMAT or MOVE assessment daily    - Set and communicate daily mobility goal to care team and patient/family/caregiver     - Collaborate with rehabilitation services on mobility goals if consulted  - Out of bed for toileting  - Record patient progress and toleration of activity level   Outcome: Progressing

## 2023-03-22 NOTE — PROGRESS NOTES
"   03/22/23 2106   Pain Assessment   Pain Assessment Tool 0-10   Pain Score No Pain   Restrictions/Precautions   Precautions Aspiration;Bed/chair alarms; Fall Risk;Cognitive;Visual deficit;Supervision on toilet/commode   Weight Bearing Restrictions No   ROM Restrictions No   Lifestyle   Autonomy \"I feel like I'm getting a lot better at working this chair\"  Oral Hygiene   Type of Assistance Needed Physical assistance   Physical Assistance Level 25% or less   Comment Min A in supported stance at sink for balance/safety  Pt require asssistance for taking off toothpaste cap  Pt then able to complete activity  Following this, Pt complete oral hygiene with use of suction device with setup while seated in   Oral Hygiene CARE Score 3   Grooming   Findings Pt complete handwashing in stance at sink with min A for balance  Pt apply chapstick to lips while seated, educ provided on importance of keeping moisturized to prevent cracking  Sit to Stand   Type of Assistance Needed Physical assistance   Physical Assistance Level 25% or less   Comment with RW, min A for hand placement and steadying   Sit to Stand CARE Score 3   Bed-Chair Transfer   Type of Assistance Needed Physical assistance   Physical Assistance Level 25% or less   Comment with RW recliner chair to [facility], verbal cues for hand placement and controlled descent into chair   Chair/Bed-to-Chair Transfer CARE Score 3   Toileting Hygiene   Type of Assistance Needed Physical assistance   Physical Assistance Level 25% or less   Comment Pt complete santiago hygiene while in stance  Verbal cues provided to utilize LUE when performing CM over hips  Req min A for CM on L side due to dec ability to maintain functional grasp on pants  Completed x2 during session  Toileting Hygiene CARE Score 3   Toileting   Able to [address] down yes, up no     Toilet Transfer   Type of Assistance Needed Physical assistance   Physical Assistance Level 25% or less   Comment RW and use of grab " bar  Min A for steadying during transfer  Toilet Transfer CARE Score 3   Functional Standing Tolerance   Time 1-2 min x 4 trials   Activity Parquetry puzzle   Comments Pt complete parquetry puzzle in stance at table to focus on standing blance, L attention, fucntional use of LUE, and activity tolerance  Pt state that pain was felt in lower back due to low height of table  OT inc height of activity by placing wooden block on to table, Pt state pain dec following this  Pt instructed to reach and grab puzzle pieces with LUE and place onto board, Pt then able to utilize RUE to precisely place in position  Pt demo inc difficulty in grabbing smaller triangular pieces with LUE, however, able to demo successs with 3 jaw luis miguel pinch and supinating forearm during transport  Pt complete activity with seated rest and water beraks to combat fatigue  Cognition   Overall Cognitive Status Impaired   Arousal/Participation Alert; Cooperative   Attention Attends with cues to redirect   Orientation Level Oriented X4   Memory Decreased short term memory;Decreased recall of recent events   Following Commands Follows one step commands without difficulty   Activity Tolerance   Activity Tolerance Patient tolerated treatment well   Assessment   Treatment Assessment Pt participate in 60 minute OT session with focus on ADL retraining, functional transfers, functional use of LUE, standing balance/tolerance, and safety awareness  CRNP Earnestine present at beginning of session  Pt tolerated teratment well, however, Pt is still requiring frequent verbal cueing to attend to L side during functional tasks  Pt would benefit from continued OT treatment focused on ADL retraining, LUE NMR, functional transfers, standing balance/tolerance, left sided inattention, safety awareness  At end of session, Pt seated in [facility] in functional space for PT session  Prognosis Fair   Problem List Decreased strength;Decreased range of motion;Decreased endurance; Impaired balance;Decreased mobility; Decreased safety awareness; Impaired judgement   Barriers to Discharge Inaccessible home environment;Decreased caregiver support   Plan   Treatment/Interventions Functional transfer training;ADL retraining; Therapeutic exercise; Endurance training;Patient/family training;Equipment eval/education; Compensatory technique education   Progress Progressing toward goals   OT Therapy Minutes   OT Time In 0930   OT Time Out 1030   OT Total Time (minutes) 60   OT Mode of treatment - Individual (minutes) 60   OT Mode of treatment - Concurrent (minutes) 0   OT Mode of treatment - Group (minutes) 0   OT Mode of treatment - Co-treat (minutes) 0   OT Mode of Treatment - Total time(minutes) 60 minutes   OT Cumulative Minutes 1800   Therapy Time missed   Time missed?  No

## 2023-03-22 NOTE — PLAN OF CARE
Problem: PAIN - ADULT  Goal: Verbalizes/displays adequate comfort level or baseline comfort level  Description: Interventions:  - Encourage patient to monitor pain and request assistance  - Assess pain using appropriate pain scale  - Administer analgesics based on type and severity of pain and evaluate response  - Implement non-pharmacological measures as appropriate and evaluate response  - Consider cultural and social influences on pain and pain management  - Notify physician/advanced practitioner if interventions unsuccessful or patient reports new pain  Outcome: Progressing     Problem: INFECTION - ADULT  Goal: Absence or prevention of progression during hospitalization  Description: INTERVENTIONS:  - Assess and monitor for signs and symptoms of infection  - Monitor lab/diagnostic results  - Monitor all insertion sites, i e  indwelling lines, tubes, and drains  - Monitor endotracheal if appropriate and nasal secretions for changes in amount and color  - [place] appropriate cooling/warming therapies per order  - Administer medications as ordered  - Instruct and encourage patient and family to use good hand hygiene technique  - Identify and instruct in appropriate isolation precautions for identified infection/condition  Outcome: Progressing  Goal: Absence of fever/infection during neutropenic period  Description: INTERVENTIONS:  - Monitor WBC    Outcome: Progressing     Problem: SAFETY ADULT  Goal: Patient will remain free of falls  Description: INTERVENTIONS:  - Educate patient/family on patient safety including physical limitations  - Instruct patient to call for assistance with activity   - Consult OT/PT to assist with strengthening/mobility   - Keep Call bell within reach  - Keep bed low and locked with side rails adjusted as appropriate  - Keep care items and personal belongings within reach  - Initiate and maintain comfort rounds  - Make Fall Risk Sign visible to staff  - Offer Toileting every 2 Hours, in advance of need  - Initiate/Maintain bed/chair alarm  - Obtain necessary fall risk management equipment: alarms  - Apply yellow socks and bracelet for high fall risk patients  - Consider moving patient to room near nurses station  Outcome: Progressing  Goal: Maintain or return to baseline ADL function  Description: INTERVENTIONS:  -  Assess patient's ability to carry out ADLs; assess patient's baseline for ADL function and identify physical deficits which impact ability to perform ADLs (bathing, care of mouth/teeth, toileting, grooming, dressing, etc )  - Assess/evaluate cause of self-care deficits   - Assess range of motion  - Assess patient's mobility; develop plan if impaired  - Assess patient's need for assistive devices and provide as appropriate  - Encourage maximum independence but intervene and supervise when necessary  - Involve family in performance of ADLs  - Assess for home care needs following discharge   - Consider OT consult to assist with ADL evaluation and planning for discharge  - Provide patient education as appropriate  Outcome: Progressing  Goal: Maintains/Returns to pre admission functional level  Description: INTERVENTIONS:  - Perform BMAT or MOVE assessment daily    - Set and communicate daily mobility goal to care team and patient/family/caregiver  - Collaborate with rehabilitation services on mobility goals if consulted  - Perform Range of Motion 3 times a day  - Reposition patient every 2 hours    - Dangle patient 3 times a day  - Stand patient 3 times a day  - Ambulate patient 3 times a day  - Out of bed to chair 3 times a day   - Out of bed for meals 3 times a day  - Out of bed for toileting  - Record patient progress and toleration of activity level   Outcome: Progressing     Problem: DISCHARGE PLANNING  Goal: Discharge to home or other facility with appropriate resources  Description: INTERVENTIONS:  - Identify barriers to discharge w/patient and caregiver  - Arrange for needed discharge resources and transportation as appropriate  - Identify discharge learning needs (meds, wound care, etc )  - Arrange for interpretive services to assist at discharge as needed  - Refer to Case Management Department for coordinating discharge planning if the patient needs post-hospital services based on physician/advanced practitioner order or complex needs related to functional status, cognitive ability, or social support system  Outcome: Progressing     Problem: Nutrition/Hydration-ADULT  Goal: Nutrient/Hydration intake appropriate for improving, restoring or maintaining nutritional needs  Description: Monitor and assess patient's nutrition/hydration status for malnutrition  Collaborate with interdisciplinary team and initiate plan and interventions as ordered  Monitor patient's weight and dietary intake as ordered or per policy  Utilize nutrition screening tool and intervene as necessary  Determine patient's food preferences and provide high-protein, high-caloric foods as appropriate       INTERVENTIONS:  - Monitor oral intake, urinary output, labs, and treatment plans  - Assess nutrition and hydration status and recommend course of action  - Evaluate amount of meals eaten  - Assist patient with eating if necessary   - Allow adequate time for meals  - Recommend/ encourage appropriate diets, oral nutritional supplements, and vitamin/mineral supplements  - Order, calculate, and assess calorie counts as needed  - Recommend, monitor, and adjust tube feedings and TPN/PPN based on assessed needs  - Assess need for intravenous fluids  - Provide specific nutrition/hydration education as appropriate  - Include patient/family/caregiver in decisions related to nutrition  Outcome: Progressing     Problem: Prexisting or High Potential for Compromised Skin Integrity  Goal: Skin integrity is maintained or improved  Description: INTERVENTIONS:  - Identify patients at risk for skin breakdown  - Assess and monitor skin integrity  - Assess and monitor nutrition and hydration status  - Monitor labs   - Assess for incontinence   - Turn and reposition patient  - Assist with mobility/ambulation  - Relieve pressure over bony prominences  - Avoid friction and shearing  - Provide appropriate hygiene as needed including keeping skin clean and dry  - Evaluate need for skin moisturizer/barrier cream  - Collaborate with interdisciplinary team   - Patient/family teaching  - Consider wound care consult   Outcome: Progressing     Problem: MOBILITY - ADULT  Goal: Maintain or return to baseline ADL function  Description: INTERVENTIONS:  -  Assess patient's ability to carry out ADLs; assess patient's baseline for ADL function and identify physical deficits which impact ability to perform ADLs (bathing, care of mouth/teeth, toileting, grooming, dressing, etc )  - Assess/evaluate cause of self-care deficits   - Assess range of motion  - Assess patient's mobility; develop plan if impaired  - Assess patient's need for assistive devices and provide as appropriate  - Encourage maximum independence but intervene and supervise when necessary  - Involve family in performance of ADLs  - Assess for home care needs following discharge   - Consider OT consult to assist with ADL evaluation and planning for discharge  - Provide patient education as appropriate  Outcome: Progressing

## 2023-03-22 NOTE — WOUND OSTOMY CARE
Progress Note - Wound   Blanco Soriano 75 y o  female MRN: [id number]  Unit/Bed#: -01 Encounter: [id number]        Assessment:   Patient seen today for wound care follow up assessment  Patient is min/mod with turning and repositioning  Patient is continent of bowel and bladder, with the occasional accident  Sacral/buttocks and B/L heels intact and blanching     1  Partial thickness wound left lateral ankle- improving with this week's assessment, small area of yellow tissue, with small amount of drainage  Periwound skin is intact  No induration, fluctuance, odor, warmth/temperature differences, redness, or purulence noted to the above noted wounds and skin areas assessed  New dressings applied per orders listed below  Patient tolerated well- no s/s of non-verbal pain or discomfort observed during the encounter  Bedside nurse aware of plan of care  See flow sheets for more detailed assessment findings  Wound care will continue to follow  Skin Care Plan:  1-Cleanse L lateral ankle wound with NSS, pat dry, and apply silvasorb gel to the wound bed  Cover with Allevyn foam dressing  Isacc dressing with T  Change every day and as needed for soilage/dislodgement  2-Turn/reposition q2h or when medically stable for pressure re-distribution on skin   3-Elevate heels to offload pressure  4-Moisturize skin daily with skin nourishing cream  5-Ehob cushion in chair when out of bed  6-Preventative Hydraguard to buttocks, sacrum, and bilateral heels BID and PRN  7-Follow-up with the [facility] wound care center as an outpatient - please call [phone] for an appointment      Wound 03/08/23 Ankle Left;Lateral (Active)   Wound Image   03/22/23 1134   Wound Description [facility] 03/22/23 1134   Pressure Injury Stage U 03/10/23 0945   Dilia-wound Assessment Fragile;[place] 03/22/23 1134   Wound Length (cm) 0 1 cm 03/22/23 1134   Wound Width (cm) 0 9 cm 03/22/23 1134   Wound Depth (cm) 0 1 cm 03/22/23 1134   Wound Surface Area (cm^2) 0 09 cm^2 03/22/23 1134   Wound Volume (cm^3) 0 009 cm^3 03/22/23 1134   Calculated Wound Volume (cm^3) 0 01 cm^3 03/22/23 1134   Change in Wound Size % 85 71 03/22/23 1134   Tunneling 0 cm 03/22/23 1134   Tunneling in depth located at 0 03/22/23 1134   Undermining 0 03/22/23 1134   Undermining is depth extending from 0 03/22/23 1134   Wound Site Closure NIURKA 03/22/23 1134   Drainage Amount Small 03/22/23 1134   Drainage Description Serosanguineous 03/22/23 1134   Non-staged Wound Description Partial thickness 03/22/23 1134   Treatments Cleansed 03/22/23 1134   Dressing Foam, Silicon (eg  Allevyn, etc); Silvasorb gel 03/22/23 1134   Wound packed? No 03/22/23 1134   Packing- # removed 0 03/22/23 1134   Packing- # inserted 0 03/22/23 1134   Dressing Changed New 03/22/23 1134   Patient Tolerance Tolerated well 03/22/23 1134   Dressing Status Clean;Dry; Intact 03/22/23 1134       Yelitza Nam BSN, RN, [employer]

## 2023-03-22 NOTE — PROGRESS NOTES
"   03/22/23 0023   Pain Assessment   Pain Assessment Tool 0-10   Pain Score 7   Pain Location/Orientation Orientation: Lower; Location: Back   Effect of Pain on Daily Activities tolerated   Patient's Stated Pain Goal No pain   Hospital Pain Intervention(s) Emotional support;Relaxation technique; Rest   Restrictions/Precautions   Precautions Aspiration;Bed/chair alarms; Fall Risk;Supervision on toilet/commode;Cognitive  (L sided inattention)   Weight Bearing Restrictions No   ROM Restrictions No   Lifestyle   Autonomy \"I'm upset about about how my left hand is doing\"  Sit to Stand   Type of Assistance Needed Physical assistance   Physical Assistance Level 25% or less   Comment with RW, verbal cues req for utilization of L hand throughout transfer  Pt benefitting from hand over hand placement of hand in some instances throughout session  Pt demo some impulsiveness in movement this session, req cues for redirection  Sit to Stand CARE Score 3   Bed-Chair Transfer   Type of Assistance Needed Physical assistance   Physical Assistance Level 25% or less   Comment RW from [facility] to [facility]  Verbal cues provided for maintaining L hand on walker throughout movement  Pt benefitting from hand over hand assistance to maintain at times  Chair/Bed-to-Chair Transfer CARE Score 3   Toileting Hygiene   Type of Assistance Needed Physical assistance   Physical Assistance Level 25% or less   Comment Pt completing santiago hygiene while seated this session  Pt requesting to change pad, however, attempting to stand to complete  Req verbal cueing and redirection to sit and insert pad for safety  Pt then stand to complete CM over hips, verbal cues required for use of LUE during CM  ultimately req min A for assistance in CM on L side  Toileting Hygiene CARE Score 3   Toilet Transfer   Type of Assistance Needed Physical assistance   Physical Assistance Level 25% or less   Comment Pt complete with RW and use of grab bar   Min A provided for " steadying throughout  Pt demo inc impulsivity in transfer during this session as indicated by attempt to abandon walker, req cues to redirect  Toilet Transfer CARE Score 3   Neuromuscular Education   Functional Movement Patterns Pt complete cone stacking activity with focus on functional reach, functional grasp, attention to L side, balance, and activity tolerance  Pt able to cones set up in line in front of Pt instructed to reach and grab with L hand and stack on R side of body  Once all cones stacked, Pt instructed to reach across midline with LUE and retrieve 1 cone at a time from therapist and place in starting line position  Pt able to complete 2x on each side using solely LUE  Pt take seated rest breaks in between sets to combat fatigue  Pt require min A in stance for balance and use of RUE on table for additional support  Cognition   Overall Cognitive Status Impaired   Arousal/Participation Alert; Cooperative   Attention Attends with cues to redirect   Orientation Level Oriented X4   Memory Decreased short term memory   Following Commands Follows one step commands with increased time or repetition   Comments Pt req inc verbal cueing this session for hand placement, proper use of walker, and inc impulsivity in movement  Additional Activities   Additional Activities Comments Pt complete additional functional reach activity with use of bean bags  Pt in stance with bean bags stacked in piles of 3 on table  Pt instructed to use LUE to remove top bean bag and reach behind self to place in [facility] (to simulate reaching behind for CM over hips)  One first attempt Pt attempting to turn 180 degrees to place in [facility], however, verbal cues provided to keep hips facing forward and focus on just reaching behind with LUE  Pt then able to follow directions to complete the rest of the task  Pt demo one instance of dropping bean bag throughout task  Pt take seated rest break following activity     Activity Tolerance   Activity Tolerance Patient tolerated treatment well   Assessment   Treatment Assessment Pt participate in 30 minute OT session focused on ADL retraining, functional transfers, LUE NMR, functional reach, and standing balance/tolerance  Pt tolerated treatment well, however, Pt demo inc impulsivity in movement and req inc verbal cueing to attend to L side throughout session  Pt benefitting from hand over hand to place LUE in proper places during transfers  Pt also benefitting from frequent reassurance throughout session regarding potential placement in TCU  Pt would benefit from continued OT session focused on ADL retraining, LUE NMR, functional transfers, standing tolerance/balance, L sided inattention, and safety awareness  Prognosis Fair   Problem List Decreased strength;Decreased range of motion;Decreased endurance; Impaired balance;Decreased mobility; Decreased cognition; Impaired judgement;Decreased safety awareness   Plan   Treatment/Interventions ADL retraining;Functional transfer training; Therapeutic exercise; Endurance training;Cognitive reorientation;Patient/family training;Equipment eval/education; Compensatory technique education   Progress Progressing toward goals   OT Therapy Minutes   OT Time In 1330   OT Time Out 1400   OT Total Time (minutes) 30   OT Mode of treatment - Individual (minutes) 30   OT Mode of treatment - Concurrent (minutes) 0   OT Mode of treatment - Group (minutes) 0   OT Mode of treatment - Co-treat (minutes) 0   OT Mode of Treatment - Total time(minutes) 30 minutes   OT Cumulative Minutes 4637   Therapy Time missed   Time missed?  No

## 2023-03-22 NOTE — PLAN OF CARE
Problem: PAIN - ADULT  Goal: Verbalizes/displays adequate comfort level or baseline comfort level  Description: Interventions:  - Encourage patient to monitor pain and request assistance  - Assess pain using appropriate pain scale  - Administer analgesics based on type and severity of pain and evaluate response  - Implement non-pharmacological measures as appropriate and evaluate response  - Consider cultural and social influences on pain and pain management  - Notify physician/advanced practitioner if interventions unsuccessful or patient reports new pain  Outcome: Progressing     Problem: INFECTION - ADULT  Goal: Absence or prevention of progression during hospitalization  Description: INTERVENTIONS:  - Assess and monitor for signs and symptoms of infection  - Monitor lab/diagnostic results  - Monitor all insertion sites, i e  indwelling lines, tubes, and drains  - Monitor endotracheal if appropriate and nasal secretions for changes in amount and color  - [place] appropriate cooling/warming therapies per order  - Administer medications as ordered  - Instruct and encourage patient and family to use good hand hygiene technique  - Identify and instruct in appropriate isolation precautions for identified infection/condition  Outcome: Progressing  Goal: Absence of fever/infection during neutropenic period  Description: INTERVENTIONS:  - Monitor WBC    Outcome: Progressing     Problem: SAFETY ADULT  Goal: Patient will remain free of falls  Description: INTERVENTIONS:  - Educate patient/family on patient safety including physical limitations  - Instruct patient to call for assistance with activity   - Consult OT/PT to assist with strengthening/mobility   - Keep Call bell within reach  - Keep bed low and locked with side rails adjusted as appropriate  - Keep care items and personal belongings within reach  - Initiate and maintain comfort rounds  - Make Fall Risk Sign visible to staff  - Offer Toileting every 2 Hours, in advance of need  - Initiate/Maintain bed/chair alarm  - Obtain necessary fall risk management equipment: nonskid socks  - Apply yellow socks and bracelet for high fall risk patients  - Consider moving patient to room near nurses station  Outcome: Progressing  Goal: Maintain or return to baseline ADL function  Description: INTERVENTIONS:  -  Assess patient's ability to carry out ADLs; assess patient's baseline for ADL function and identify physical deficits which impact ability to perform ADLs (bathing, care of mouth/teeth, toileting, grooming, dressing, etc )  - Assess/evaluate cause of self-care deficits   - Assess range of motion  - Assess patient's mobility; develop plan if impaired  - Assess patient's need for assistive devices and provide as appropriate  - Encourage maximum independence but intervene and supervise when necessary  - Involve family in performance of ADLs  - Assess for home care needs following discharge   - Consider OT consult to assist with ADL evaluation and planning for discharge  - Provide patient education as appropriate  Outcome: Progressing  Goal: Maintains/Returns to pre admission functional level  Description: INTERVENTIONS:  - Perform BMAT or MOVE assessment daily    - Set and communicate daily mobility goal to care team and patient/family/caregiver  - Collaborate with rehabilitation services on mobility goals if consulted  - Perform Range of Motion 3 times a day  - Reposition patient every 2 hours    - Dangle patient 3 times a day  - Stand patient 3 times a day  - Ambulate patient 3 times a day  - Out of bed to chair 3 times a day   - Out of bed for meals 3 times a day  - Out of bed for toileting  - Record patient progress and toleration of activity level   Outcome: Progressing     Problem: DISCHARGE PLANNING  Goal: Discharge to home or other facility with appropriate resources  Description: INTERVENTIONS:  - Identify barriers to discharge w/patient and caregiver  - Arrange for needed discharge resources and transportation as appropriate  - Identify discharge learning needs (meds, wound care, etc )  - Arrange for interpretive services to assist at discharge as needed  - Refer to Case Management Department for coordinating discharge planning if the patient needs post-hospital services based on physician/advanced practitioner order or complex needs related to functional status, cognitive ability, or social support system  Outcome: Progressing     Problem: Nutrition/Hydration-ADULT  Goal: Nutrient/Hydration intake appropriate for improving, restoring or maintaining nutritional needs  Description: Monitor and assess patient's nutrition/hydration status for malnutrition  Collaborate with interdisciplinary team and initiate plan and interventions as ordered  Monitor patient's weight and dietary intake as ordered or per policy  Utilize nutrition screening tool and intervene as necessary  Determine patient's food preferences and provide high-protein, high-caloric foods as appropriate       INTERVENTIONS:  - Monitor oral intake, urinary output, labs, and treatment plans  - Assess nutrition and hydration status and recommend course of action  - Evaluate amount of meals eaten  - Assist patient with eating if necessary   - Allow adequate time for meals  - Recommend/ encourage appropriate diets, oral nutritional supplements, and vitamin/mineral supplements  - Order, calculate, and assess calorie counts as needed  - Recommend, monitor, and adjust tube feedings and TPN/PPN based on assessed needs  - Assess need for intravenous fluids  - Provide specific nutrition/hydration education as appropriate  - Include patient/family/caregiver in decisions related to nutrition  Outcome: Progressing     Problem: Prexisting or High Potential for Compromised Skin Integrity  Goal: Skin integrity is maintained or improved  Description: INTERVENTIONS:  - Identify patients at risk for skin breakdown  - Assess and monitor skin integrity  - Assess and monitor nutrition and hydration status  - Monitor labs   - Assess for incontinence   - Turn and reposition patient  - Assist with mobility/ambulation  - Relieve pressure over bony prominences  - Avoid friction and shearing  - Provide appropriate hygiene as needed including keeping skin clean and dry  - Evaluate need for skin moisturizer/barrier cream  - Collaborate with interdisciplinary team   - Patient/family teaching  - Consider wound care consult   Outcome: Progressing     Problem: MOBILITY - ADULT  Goal: Maintain or return to baseline ADL function  Description: INTERVENTIONS:  -  Assess patient's ability to carry out ADLs; assess patient's baseline for ADL function and identify physical deficits which impact ability to perform ADLs (bathing, care of mouth/teeth, toileting, grooming, dressing, etc )  - Assess/evaluate cause of self-care deficits   - Assess range of motion  - Assess patient's mobility; develop plan if impaired  - Assess patient's need for assistive devices and provide as appropriate  - Encourage maximum independence but intervene and supervise when necessary  - Involve family in performance of ADLs  - Assess for home care needs following discharge   - Consider OT consult to assist with ADL evaluation and planning for discharge  - Provide patient education as appropriate  Outcome: Progressing  Goal: Maintains/Returns to pre admission functional level  Description: INTERVENTIONS:  - Perform BMAT or MOVE assessment daily    - Set and communicate daily mobility goal to care team and patient/family/caregiver  - Collaborate with rehabilitation services on mobility goals if consulted  - Perform Range of Motion 3 times a day  - Reposition patient every 2 hours    - Dangle patient 3 times a day  - Stand patient 3 times a day  - Ambulate patient 3 times a day  - Out of bed to chair 3 times a day   - Out of bed for meals 3 times a day  - Out of bed for toileting  - Record patient progress and toleration of activity level   Outcome: Progressing

## 2023-03-22 NOTE — PROGRESS NOTES
03/22/23 1030   Pain Assessment   Pain Assessment Tool 0-10   Pain Score 9  (midway through PT and asked for pain meds which was given by VIGNESH Bansal)   Wheeler-Bhandari FACES Pain Rating 4   Pain Location/Orientation Location: Hip;Location: Groin;Orientation: Bilateral   Pain Onset/Description Onset: Ongoing; Onset: Sudden;Frequency: Intermittent   Effect of Pain on Daily Activities tolerated   Patient's Stated Pain Goal No pain   Hospital Pain Intervention(s) Medication (See MAR); Repositioned; Rest   Restrictions/Precautions   Precautions Aspiration; Fall Risk;Bed/chair alarms;Supervision on toilet/commode;Pain  (L side inattention)   Cognition   Overall Cognitive Status Impaired   Arousal/Participation Alert; Cooperative   Attention Attends with cues to redirect   Following Commands Follows one step commands with increased time or repetition   Subjective   Subjective pt had no new c/o and ready for PT after OT session   Sit to Stand   Type of Assistance Needed Physical assistance;Verbal cues   Physical Assistance Level 25% or less   Comment again VC for inc utlization of L hand during transfers   Sit to Stand CARE Score 3   Bed-Chair Transfer   Type of Assistance Needed Physical assistance;Verbal cues; Adaptive equipment   Physical Assistance Level 25% or less   Comment RW or no AD max VC with guiding assist to complete turns or keeping walker in front   Chair/Bed-to-Chair Transfer CARE Score 3   Walk 10 Feet   Type of Assistance Needed Physical assistance;Verbal cues; Adaptive equipment   Physical Assistance Level 25% or less   Comment RW   Walk 10 Feet CARE Score 3   Walk 50 Feet with Two Turns   Type of Assistance Needed Physical assistance;Verbal cues; Adaptive equipment   Physical Assistance Level 25% or less   Comment CG in straight path but assist required at times with turns and keeping herself within the walker   Walk 50 Feet with Two Turns CARE Score 3   Walk 150 Feet   Comment fatigue, back pain, weakness   Walking 10 Feet on Uneven Surfaces   Type of Assistance Needed Physical assistance;Verbal cues; Adaptive equipment   Physical Assistance Level 25% or less   Comment RW over foam mat   Walking 10 Feet on Uneven Surfaces CARE Score 3   Wheel 50 Feet with Two Turns   Type of Assistance Needed Supervision;Verbal cues   Comment bilat LE and R UE only   not safe to utilized L  UE due to weakness, impaired proprioception and L side inattention putting pt at risk to L hand injury   Wheel 50 Feet with Two Turns CARE Score 4   Wheel 150 Feet   Type of Assistance Needed Physical assistance;Verbal cues   Physical Assistance Level 25% or less   Comment min A with turns in narrow spaces due to left side inattention while S in open spaces   Wheel 150 Feet CARE Score 3   Toilet Transfer   Type of Assistance Needed Physical assistance;Verbal cues; Adaptive equipment   Physical Assistance Level 25% or less   Comment assist to use L hand with CM, VC for proper hygiene technique to reduce risk for UTI reoccurence while using R hand and L hand on walker for support   Toilet Transfer CARE Score 3   Therapeutic Interventions   Neuromuscular Re-Education standing in front of the hi-lo table and spraying and wiping cones and stacking them using L hand only while pt wearing gloves  wiping table with rag using L hand only  folding a shirt with left hand only, reaching and drinking NT water in a cup or wiping nose with tissue using left hand only with assist as needed  grabbing bean bag on the left side and throwing them using L hand only   walking ~ 5' without AD while holding bean bag on left hand x 1 then small bottle of lotion on L hand x 3 reps with goal for pt not to drop object, putting lotion on R hand/forearm using L hand  Assessment   Treatment Assessment skilled PT focused on NMR for balance training and inc L side/body awareness without using an AD   also worked on household distance mobilities using a walker and w/c propulsion but ongoing L side inattention and L hemiparesis with impaired proprioception cont to greatly impact pt's functional indep and safety requiring max VC and assist at times to ensure safe L hand placement on RW  see info above for details of therapy session  Pt assisted back in recliner with alarm on and all needs within reach at end of tx in preparation for lunch with ST    Plan   Treatment/Interventions Functional transfer training;LE strengthening/ROM; Therapeutic exercise; Endurance training;Bed mobility;Gait training;Spoke to nursing;OT;Compensatory technique education;Cognitive reorientation   Progress Progressing toward goals   Recommendation   PT Discharge Recommendation Post acute rehabilitation services   PT Therapy Minutes   PT Time In 1030   PT Time Out 1130   PT Total Time (minutes) 60   PT Mode of treatment - Individual (minutes) 60   PT Mode of treatment - Concurrent (minutes) 0   PT Mode of treatment - Group (minutes) 0   PT Mode of treatment - Co-treat (minutes) 0   PT Mode of Treatment - Total time(minutes) 60 minutes   PT Cumulative Minutes 1548   Therapy Time missed   Time missed?  No

## 2023-03-23 RX ADMIN — BUPROPION HYDROCHLORIDE TABLETS 100 MG: 100 TABLET, FILM COATED ORAL at 18:05

## 2023-03-23 RX ADMIN — ACETAMINOPHEN 650 MG: 325 TABLET ORAL at 19:40

## 2023-03-23 RX ADMIN — HYDROXYCHLOROQUINE SULFATE 200 MG: 200 TABLET ORAL at 08:54

## 2023-03-23 RX ADMIN — Medication 1 APPLICATION.: at 08:53

## 2023-03-23 RX ADMIN — ATORVASTATIN CALCIUM 40 MG: 40 TABLET, FILM COATED ORAL at 18:01

## 2023-03-23 RX ADMIN — MELATONIN 3 MG: at 21:00

## 2023-03-23 RX ADMIN — MORPHINE SULFATE 7.5 MG: 15 TABLET ORAL at 10:47

## 2023-03-23 RX ADMIN — Medication: at 21:04

## 2023-03-23 RX ADMIN — ONDANSETRON 4 MG: 4 TABLET, ORALLY DISINTEGRATING ORAL at 11:44

## 2023-03-23 RX ADMIN — BUPROPION HYDROCHLORIDE TABLETS 100 MG: 100 TABLET, FILM COATED ORAL at 08:54

## 2023-03-23 RX ADMIN — LAMOTRIGINE 150 MG: 100 TABLET ORAL at 21:00

## 2023-03-23 RX ADMIN — GABAPENTIN 100 MG: 100 CAPSULE ORAL at 08:53

## 2023-03-23 RX ADMIN — SENNOSIDES AND DOCUSATE SODIUM 1 TABLET: 8.6; 5 TABLET ORAL at 18:01

## 2023-03-23 RX ADMIN — ACETAMINOPHEN 650 MG: 325 TABLET ORAL at 05:57

## 2023-03-23 RX ADMIN — Medication: at 18:10

## 2023-03-23 RX ADMIN — SENNOSIDES AND DOCUSATE SODIUM 1 TABLET: 8.6; 5 TABLET ORAL at 08:52

## 2023-03-23 RX ADMIN — GABAPENTIN 100 MG: 100 CAPSULE ORAL at 18:01

## 2023-03-23 RX ADMIN — PANTOPRAZOLE SODIUM 40 MG: 40 TABLET, DELAYED RELEASE ORAL at 05:57

## 2023-03-23 RX ADMIN — DULOXETINE HYDROCHLORIDE 60 MG: 60 CAPSULE, DELAYED RELEASE ORAL at 21:00

## 2023-03-23 RX ADMIN — APIXABAN 5 MG: 5 TABLET, FILM COATED ORAL at 18:01

## 2023-03-23 RX ADMIN — METOPROLOL SUCCINATE 25 MG: 25 TABLET, EXTENDED RELEASE ORAL at 18:01

## 2023-03-23 RX ADMIN — ALUMINUM HYDROXIDE, MAGNESIUM HYDROXIDE, AND DIMETHICONE 30 ML: 200; 20; 200 SUSPENSION ORAL at 20:59

## 2023-03-23 RX ADMIN — CINACALCET 30 MG: 30 TABLET, FILM COATED ORAL at 08:54

## 2023-03-23 RX ADMIN — DULOXETINE HYDROCHLORIDE 60 MG: 60 CAPSULE, DELAYED RELEASE ORAL at 08:52

## 2023-03-23 RX ADMIN — APIXABAN 5 MG: 5 TABLET, FILM COATED ORAL at 08:52

## 2023-03-23 RX ADMIN — LIDOCAINE 5% 1 PATCH: 700 PATCH TOPICAL at 08:52

## 2023-03-23 NOTE — PROGRESS NOTES
03/23/23 1000   Pain Assessment   Pain Assessment Tool 0-10   Pain Score 6  (4-6/10)   Wheeler-Bhandari FACES Pain Rating 4   Pain Location/Orientation Orientation: Bilateral;Location: Back;Orientation: Lower   Pain Radiating Towards no   Pain Onset/Description Onset: Ongoing;Frequency: Constant/Continuous   Effect of Pain on Daily Activities required seated rest breaks as needed   Hospital Pain Intervention(s) Medication (See MAR); Rest   Restrictions/Precautions   Precautions Cognitive;Bed/chair alarms; Fall Risk;Supervision on toilet/commode;Pain;Visual deficit; Impulsive  (L side inattention, NTL)   General   Change In Medical/Functional Status automatic BP: 83/50 R arm, GA 87%  Manual BP on R arm 96/60 - RN doc results under vitals, encourage fluid this session and finished 120 ml NTL water  Also applied lotion on hands and top of feet due to dry skin using Eucerin per MD orders  Cognition   Overall Cognitive Status Impaired   Arousal/Participation Alert; Cooperative   Attention Attends with cues to redirect   Orientation Level Oriented X4   Memory Decreased short term memory;Decreased recall of recent events;Decreased recall of precautions   Following Commands Follows one step commands with increased time or repetition   Subjective   Subjective pt reported fatigue but agreeable to have PT   Roll Left and Right   Type of Assistance Needed Verbal cues; Physical assistance; Adaptive equipment   Physical Assistance Level 25% or less   Roll Left and Right CARE Score 3   Sit to Lying   Type of Assistance Needed Supervision; Adaptive equipment;Verbal cues   Comment HOB flat without rail   Sit to Lying CARE Score 4   Lying to Sitting on Side of Bed   Type of Assistance Needed Physical assistance;Verbal cues; Adaptive equipment   Physical Assistance Level 25% or less   Comment with rail   Lying to Sitting on Side of Bed CARE Score 3   Sit to Stand   Type of Assistance Needed Physical assistance; Adaptive equipment;Verbal cues Physical Assistance Level 25% or less   Comment min-CS with max VC for L hand placement and to keep walker in front vs leaving it to the side   Sit to Stand CARE Score 3   Bed-Chair Transfer   Type of Assistance Needed Verbal cues; Adaptive equipment;Physical assistance   Physical Assistance Level 25% or less   Comment CS to min A with VC to keep L hand placement on walker   Chair/Bed-to-Chair Transfer CARE Score 3   Car Transfer   Type of Assistance Needed Verbal cues; Adaptive equipment; Incidental touching   Comment CGA with RW   Car Transfer CARE Score 4   Walk 10 Feet   Type of Assistance Needed Verbal cues; Adaptive equipment;Physical assistance   Physical Assistance Level 25% or less   Comment min A-CS with RW assist/VC to avoid hitting furniture with R side of the walker, assist in opening door while managing walker to enter bathroom   Walk 10 Feet CARE Score 3   Walk 50 Feet with Two Turns   Type of Assistance Needed Physical assistance;Verbal cues; Adaptive equipment   Physical Assistance Level 25% or less   Comment min - CS with RW, VC to stay inside the walker yvette with turns x 50'   Walk 50 Feet with Two Turns CARE Score 3   Walk 150 Feet   Comment fatigue, back pain, weakness   Reason if not Attempted Safety concerns   Walk 150 Feet CARE Score 88   Walking 10 Feet on Uneven Surfaces   Type of Assistance Needed Verbal cues; Adaptive equipment;Physical assistance   Physical Assistance Level 25% or less   Comment CG-min A with RW VC for safe foot placement over foam mat   Walking 10 Feet on Uneven Surfaces CARE Score 3   Wheel 50 Feet with Two Turns   Type of Assistance Needed Verbal cues; Physical assistance   Physical Assistance Level 25% or less   Comment occasional min A in narrow spaces due to left side inattention and S in open spaces   also reminder to avoid using L UE for due to inattention, weakness and proprioception impairment at risk for injury   Wheel 50 Feet with Two Turns CARE Score 3   Wheel 150 "Feet   Type of Assistance Needed Physical assistance;Verbal cues   Physical Assistance Level 25% or less   Comment occasional min A in narrow spaces due to left side inattention and S in open spaces  also reminder to avoid using L UE for due to inattention, weakness and proprioception impairment at risk for injury   Wheel 150 Feet CARE Score 3   Curb or Single Stair   Style negotiated Single stair   Type of Assistance Needed Verbal cues; Adaptive equipment;Physical assistance   Physical Assistance Level 25% or less   Comment assist/VC to keep L hand on rail  x 6 steps on 6\"  bilat HR reciprocal pattern descending fwd - limited by back pain , fatigue, weakness   1 Step (Curb) CARE Score 3   4 Steps   Type of Assistance Needed Verbal cues; Adaptive equipment;Physical assistance   Physical Assistance Level 25% or less   4 Steps CARE Score 3   12 Steps   Comment limited by fatigue, weakness and back pain   Reason if not Attempted Safety concerns   12 Steps CARE Score 88   Picking Up Object   Type of Assistance Needed Physical assistance;Verbal cues; Adaptive equipment   Physical Assistance Level 25% or less   Comment CG-min A with R hand using reacher   Picking Up Object CARE Score 3   Toilet Transfer   Type of Assistance Needed Physical assistance;Verbal cues; Adaptive equipment   Physical Assistance Level 25% or less   Comment min A with RW and VC  assist for CM as well  Had bladder movement x 2 this session  Toilet Transfer CARE Score 3   Therapeutic Interventions   Neuromuscular Re-Education standing in front of window sill and had pt  post it from window sill with L hand and posting it on the window glass x 2 mins and 20 secs on first trial then 4 mins and 04 secs on 2nd trial   Other Comments   Comments had to help pt change shirt then put on jacket for she spilled NT water on her shirt which happened before tx session  reviewed with nursing pt requires full S with meals and even with drinking    mod A " for UB dressing   Assessment   Treatment Assessment Pt tolerated tx although due to reported fatigue and ongoing back pain required frequent seated rest breaks  also had to encourage fluids due to low BP although pt was asymptomatic throughout tx  Due to L side inattention, L hemiparesis and impaired proprioception pt cont to demo inconsistent level of functional indep requiring CS at best to min A to complete tasks safely yvette with RW management around obstacles  Pt still at high risk for falls and inc caregiver burden so will greatly benefit from additional skilled PT intervention to improve overall functions in order for pt to return home safely with family support/assist  pt schedule to be d/c to [address] [place], [place] [id number] tomorrow to continue rehab  PT to perform FT for car transfer training before d/c tomorrow as well  Family/Caregiver Present no   PT Family training done with: PT reached out to dtr to schedule FT for car transfer before TCU d/c tomorrow for pt will be transporting pt   Barriers to Discharge Comments d/c to [facility] on 3/24/23 to continue rehab   PT Barriers   Functional Limitation Car transfers; Ramp negotiation;Stair negotiation;Standing;Transfers; Walking   Plan   Treatment/Interventions Functional transfer training; Therapeutic exercise; Endurance training;Bed mobility;Gait training;Spoke to nursing;OT;Spoke to case management;Spoke to MD;Spoke to advanced practitioner;Compensatory technique education;Cognitive reorientation   Progress Progressing toward goals   Recommendation   PT Discharge Recommendation Post acute rehabilitation services   Equipment Recommended Wheelchair;Walker   PT Therapy Minutes   PT Time In 1000   PT Time Out 1130   PT Total Time (minutes) 90   PT Mode of treatment - Individual (minutes) 80   PT Mode of treatment - Concurrent (minutes) 10   PT Mode of treatment - Group (minutes) 0   PT Mode of treatment - Co-treat (minutes) 0   PT Mode of Treatment - Total time(minutes) 90 minutes   PT Cumulative Minutes [id number]

## 2023-03-23 NOTE — CASE MANAGEMENT
Case Management Update:  Cm spoke with Asher Hodgkins via telephone regarding SNF options, [facility] vs [facility] both are accepting  Asher Hodgkins spoke with pt and they are both agreeable to transition to [facility]  Pt's daughter reports she is able to transport pt tomorrow at 11AM  CM made team aware and is awaiting final determination from TCU that 11AM time is acceptable  1:49PM: Jeannine from [address] [place] [address] requesting family transport after 5:30/6PM to ensure pt's room at [facility] is  And ready for pt, nereida spoke with Asher Hodgkins and confirmed she is able to accommodate transporting pt  TCF requesting COVID test, nereida made MD aware and rest of team aware of dc plan

## 2023-03-23 NOTE — PROGRESS NOTES
PM&R PROGRESS NOTE:  Yamil Wiggins 75 y o  female MRN: [id number]  Unit/Bed#: -01 Encounter: [id number]      Rehabilitation Diagnosis: Impairment of mobility, safety, Activities of Daily Living (ADLs), and cognitive/communication skills due to Stroke:  01 1  Left Body Involvement (Right Brain)    HPI: Yamil Wiggins is a 75 y o  female with history of asthma, anxiety, bipolar depression, chronic narcotic use, previous enteroviral infection of the heart, seronegative arthropathy of multiple joints, undifferentiated connective tissue disorder, obstructive sleep apnea on home CPAP who presented to the [address] [place] [address] on 2/22 for altered mental status  According to the patient's daughter on chart review, the patient's mental status had been declining for the prior 48 hours to admission  She did receive Narcan which improved her mental status slightly  Of note she had a recent stroke with revascularization on 2/12/2023 with Dr Wilton Connors  She was seen by neurology with overall good strength except mildly weak in the lower extremities with brisk reflexes on the left and a positive Haroon's  At this time is felt to be potentially related to polypharmacy in the setting of chronic pain meds versus seizure in the setting of a recent stroke  She was initiated on 81 mg of aspirin daily with a plan of potentially transitioning to Eliquis, plan for EEG  CT of the head was negative  MRI was completed showing a new small acute/recent interval lacunar infarction in the right cerebral pedun there is jacinta  She was started on Eliquis  Nephrology was consulted as well for hypercalcemia  The patient was evaluated by the Rehabilitation team and deemed an appropriate candidate for comprehensive inpatient rehabilitation and admitted to the [facility] on 2/28/2023  3:41 PM patient was seen in room with brother at bedside and all questions regarding the acute inpatient rehabilitation program were answered  We also discussed at length course in the hospital and her last hospitalization to verify events and clarify any imaging or laboratory findings  Patient is eager to start her therapy program       SUBJECTIVE: Patient seen and evaluated during therapy session  Overall she is feels well and there is some question about her dry skin on her feet which I would recommend Eucerin or calazime lotion on at this time there is no itching or pain  She continues to have good mental status and arousal and continues to take her morphine however is averaging 1-2 times a day which is approximately half or less than her prior daily MME's  This in addition to the [address] has made a significant difference in her performance  She is for discharge tomorrow to the [facility] and will provide prescriptions for controlled substances for the time being  She will need to obtain future prescriptions through her pain doctor however this should be amenable as she is on less pain medications than previously  She denies any fever, chills, nausea, vomiting, cough, shortness of breath, diarrhea, constipation  She is sleeping well her mood is stable and she is participating fully in therapy  Pain is relatively controlled and we continue to monitor  ASSESSMENT: Stable, progressing    PLAN:    Rehabilitation  • Functional deficits:  Self care and mobility  • Continue current rehabilitation plan of care to maximize function      • Functional update:   Physical Therapy Occupational Therapy Speech Therapy   Weight Bearing Status: Full Weight Bearing  Transfers: Incidental Touching, Minimal Assistance  Bed Mobility: Incidental Touching  Amulation Distance (ft): 100 feet  Ambulation: Incidental Touching  Assistive Device for Ambulation: Roller Walker  Wheelchair Mobility Distance: 100 ft  Wheelchair Mobility: Supervision  Number of Stairs: 6  Assistive Device for Stairs: Bilateral Hand Rails  Stair Assistance: Incidental Touching  Discharge Recommendations: Skilled Nursing Facility   Eating: Supervision  Grooming: Minimal Assistance  Bathing: Moderate Assistance  Bathing: Moderate Assistance  Upper Body Dressing: Moderate Assistance  Lower Body Dressing: Moderate Assistance  Toileting: Moderate Assistance  Tub/Shower Transfer: Moderate Assistance  Toilet Transfer: Minimal Assistance  Cognition: Exceptions to WNL  Cognition: Decreased Memory, Decreased Executive Functions, Decreased Attention, Decreased Safety  Orientation: Person, Place, Time, Situation   Mode of Communication: Verbal  Speech/Language: Dysarthia  Cognition: Exceptions to WNL  Cognition: Decreased Memory, Decreased Executive Functions, Decreased Attention, Decreased Comprehension, Decreased Safety  Orientation: Person, Place, Time, Situation  Swallowing: Exceptions to WNL  Swallowing: Oral Dysphagia, Pharyngeal Dysphagia, Aspiration Risk  Diet Recommendations: Level 1/Cami Garvey Thick  Discharge Recommendations:  (pending progress)  DC Home with[de-identified] 24 Hour Supervision, 24 Hour Assisteance, Family Support, Home Speech Therapy, Outpatient Speech Therapy       • Estimated Discharge: Transition to subacute rehabilitation    DVT prophylaxis  • On Eliquis     Pain  • Cymbalta 60 mg twice daily  • Gabapentin 100 mg twice daily  • Morphine 7 5 mg 4 times a day as needed  • Acetaminophen 650 mg every 6 hours as needed     Bladder plan  • Continent     Bowel plan  • Continent  • Last bowel movement 3/20     Code Status  • Level 1 full code      * Acute CVA (cerebrovascular accident) [facility])  Assessment & Plan  3/8 - neuro exam stable - continue to monitor closely   CT head 3/7 - Evolving right MCA territory infarcts with associated petechial hemorrhage which is best seen on the recent MRI examination  There is no discrete parenchymal hematoma  - discussed above with johny Hull - no acute bleeding noted   MRI brain 2/22 - 1    New small acute /more recent interval lacunar infarction in the right cerebral peduncle (series 4, image 14)  2   Extensive multifocal large, previously present right middle cerebral artery infarction which is evolving with superimposed areas of evolving hemorrhage likely related to hemorrhagic transformation of subacute right MCA infarction  No significant   mass effect  3   Mild, chronic microangiopathy  S/P thrombectomy 2/12 by Dr Priscila Foreman   Currently on a dysphagia 1 diet with thin liquids  Secondary stroke prophylaxis at this time with Eliquis 5 mg twice daily (aspirin d/c'd earlier); and statin; optimal BP control   PT, OT, SLP  Consideration for neuropsychology if patient agreeable  Review modifiable risk factors and provide stroke education  Monitor for poststroke pain  Close neuro follow-up as patient has had multiple strokes in the last 2 months    Hypokalemia  Assessment & Plan  Hypokalemia with potassium down to 3 4 given 40 mill equivalents today on 3/10 but IV infiltrated  Able to get PO dose over weekend  Potasium now WNL  Recheck BMP as of 3/20 up to 3 7 and stable    Wound of left ankle  Assessment & Plan  On lateral ankle possibly occurred at time of fall, reviewed wound getting SilvaSorb examined today on 3/15 with nursing  - Wound care c/s  1  Cleanse L lateral ankle wound with NSS, pat dry, and apply silvasorb gel to the wound bed  Cover with Allevyn foam dressing  Isacc dressing with T  Change every day and as needed for soilage/dislodgement  2  Preventative nursing skin care orders placed  3   Pressure relief, turning and repositioning with foam offloading wedges and waffle cushion to the care  Prevalon boot     Dysphagia  Assessment & Plan  - Current diet: Modified - D1, NTL after failed VBS, GI consulted, plan for esophogram, protonix added  - Recommend VBS study-VBS completed on 3/9 with evidence for esophageal dysmotility and dysphagia recommending puréed with nectar thick, okay for ice chips  - SLP evaluate and treat decline in swallowing   - Aspiration precautions; HOB completely upright when eating, 100% supervision, monitor for pocketing  - Hold oral intake if patient too confused or lethargic and notify MD   - Oral care with meals and bedtime  - Ensure adequate hydration  - Nutrition consult to assist with management   - Rehab nursing and staff to ensure safe and appropriate feeding to decrease risk of aspiration     - Rehab physician oversight dysphagia management and risks associated with it  - Monitor for appropriate fluid and nutrition intake      Gout of foot  Assessment & Plan  Much improved on prednisone  Uric acid wnl   R 1st MTPJ redness and focal tenderness without significant edema or skin breakdown   - IM feels most c/w gout and have ordered prednisone - taper thru 3/12  - Monitor for improvement or non-improvement and other etiologies as well closely     AMS (altered mental status)  Assessment & Plan  Mentation improving as of 3/13 and more consistently after reduction in opioids as well as addition of Sensipar decreasing her overall calcium levels- Continues to be stable as of 3/17  3/7 Patient with some sleepiness at times apparently last several days with poor activity tolerance and now s/p fall - appears back to recent baseline on follow-up eval   CT head 3/7 - Evolving right MCA territory infarcts with associated petechial hemorrhage which is best seen on the recent MRI examination  There is no discrete parenchymal hematoma   - discussed above with radiologist Della - no acute bleeding noted   - Ensure adequate nutrition and hydration with altered diet and sub-optimal intake BUN/Cr acceptable   - Continue Eliquis, monitor vitals closley  - Comgmt with IM team   - Patient afebrile; CBC wnl; LFTs wnl; Ammonia wnl  - Continue Abx of UTI   - Calcium elevated > optimal mgmt per endo > adding Sensipar   - Hx of SHAR - continue 2L at night O2 with spot checks - refusing CPAP - obtain noct ox on 2L   - Hold standing long-acting MS Contin 15mg Q12H and transitioned to MS IR 7 5mg Q6H PRN   - Gabapentin 100mg BID weaned down recently            UTI (urinary tract infection)  Assessment & Plan  UA last week with culture growing greater than 100,000 CFU Morganella and Serratia  Started on Ciprofloxacin but transitioned to Cefpodixime per prior providers with plan to continue into 3/10   Dysuria improving     History of stroke  Assessment & Plan  Patient with recent admission in early 2023 for right MCA CVA with petechial hemorrhage and residual left-sided weakness and dysarthria  Patient had mechanical thrombectomy with Dr Lexie Gold on 2/12    Obstructive sleep apnea  Assessment & Plan  Patient refusing CPAP 2/2 claustrophobia - refuses home or hospital CPAP   2L O2 at night with spot checks   Obtain noct ox on 2L; repeat noct ox prior to d/c for possible home DME     History of bacteremia  Assessment & Plan  Unclear etiology initially of whether her altered mental status was related to stroke versus polypharmacy versus opioid overdose versus infection versus seizure  1/2 micrococcus with a second set staph coag negative and followed by infectious disease after course of antibiotic treatment  Has been off of antibiotics and repeat blood cultures negative to date, ID felt may be contaminant    Discoloration of skin of foot  Assessment & Plan  N/V intact - no significant discoloration today   Per daughter and patient patient's foot will become blue or red and change colors frequently  No evidence for infectious etiology and had a lower extremity arterial duplex with no evidence of any significant arterial occlusive disease  May be related to the seronegative spondyloarthropathies  Likely the source of of the inaccurate and inconsistent pulse oximetry readings in the hands as well    Hypercalcemia  Assessment & Plan  Nephrology had been following, nonsuppressed PTH with concerns for primary hyperparathyroidism - they defer to endo on mgmt   C spine XR 3/7 - "Salt-and-pepper appearance of the skull is partially accentuated by the image processing /sharpening algorithm, though prior head CT suggests this is a true finding  Correlate for clinical/laboratory features of hyperparathyroidism; Salt-and-pepper appearance of the skull accentuated by image processing/sharpening algorithm  However, prior head CT shows a mottled appearance of the skull as well suggesting hyperparathyroidism  No focal lytic or blastic lesions  No acute fractures  Endocrine consult   \"SPEP-no monoclonal bands, UPEP- no monoclonal bands, PTH RP less than 2, 25-hydroxy vitamin D 41 7, 1,25 hydroxy vitamin D 23 7 low, Phos 2 5 low, PTH 65 4   Assessment/Plan:  1  Hypercalcemia: Suspected to be on the basis of primary hyperparathyroidism: Corrected calcium continues to increase slightly  Sensipar 30 mg daily was started and is being tolerated  Given history of osteoporosis, if appropriate surgical candidate consider surgery referral as outpatient  Follow-up with endocrinology as outpatient  Monitor corrected calcium level  Adjust dose as needed  Status post a CT of the chest abdomen and pelvis with no overt malignancy  - Patient has allergy to contrast - discussed with endo fellow  -Calcium as of 3/20 continues to come down with the use of Sensipar    Most recently down to 8 8 from a peak of 12      HTN (hypertension)  Assessment & Plan  Patient with some orthostasis  IM consulted and with overall management at their discretion during ARC course  Monitor orthostatics; PRN ADDY hose, abdominal binder - ensure adequate hydration   Currently on metoprolol succinate 25 mg twice daily with holding parameters   Plan per cardiology did not add an ARB in the setting of heart failure however as blood pressure allows    Bipolar depression ([facility] [address] )  Assessment & Plan  Currently on Wellbutrin 100 mg twice daily, Lamictal 150 mg nightly, and Cymbalta 60 mg twice daily  Consistent with home regimen  Continue " "monitoring for stability, mood lability  Consider neuropsychology consultation if patient agreeable    Fall  Assessment & Plan  3/7 early morning - per report \"unwitnessed FALL; Per RN patient set off bed alarm, but has already fallen onto her left side  Confused and wanting to wash her face  Once more awake, mentation improved  \"  - CTH evolving infarct without new bleeding per rads  - L knee mild pain but good ROM without significant edema or instability > improving - XR unremarkable - monitor   - Mild L hip pain but good ROM and can adequately WB > improving - L hip/pelvis XR - mild hip OA without acute findings -  monitor  - Some L shoulder pain - some mildly decreased AROM, +Muñoz > stable -  L shoulder XR - unremarkable - monitor    - Subtle neck pain but full ROM without radicular complaints or focal weakness, sensory changes > improving - C spine XR - no acute findings or change from prior - chronic changes > Chronic hyperkyphosis  Anterolisthesis at C4-5 is unchanged  No acute subluxation; Salt-and-pepper appearance of the skull accentuated by image processing/sharpening algorithm  Multilevel moderate to severe discogenic and uncovertebral degenerative changes greatest at C5-6 and C6-7  Multilevel mild facet degenerative changes greatest at C4-5  > monitor   - Optimal mgmt with wakefulness/AMS - continue 2L at night O2 with spot checks - refusing CPAP  - Has been demonstrating use of call bell and not getting up overnight without assistance  After team discussion, plan for discontinuing observation  Cervical spondylosis  Assessment & Plan  C spine XR 3/7 - Chronic hyperkyphosis  Anterolisthesis at C4-5 is unchanged  No acute subluxation; Salt-and-pepper appearance of the skull accentuated by image processing/sharpening algorithm  However, prior head CT shows a mottled appearance of the skull as well suggesting hyperparathyroidism  No focal lytic or blastic lesions  No acute fractures    " Multilevel moderate to severe discogenic and uncovertebral degenerative changes greatest at C5-6 and C6-7    Multilevel mild facet degenerative changes greatest at C4-5   - No signs of myelopathy or radiculopathy - monitor     Osteoporosis  Assessment & Plan  History of an L1 compression fracture status post kyphoplasty  Was previously on Prolia has not been on since 2020  Outpatient referral to endocrine    CHF (congestive heart failure) ([facility] [address] )  Assessment & Plan  Wt Readings from Last 3 Encounters:   03/22/23 62 5 kg (137 lb 12 6 oz)   02/16/23 62 6 kg (138 lb)   02/12/23 67 9 kg (149 lb 11 1 oz)       Cardiomyopathy with reduced ejection fraction as low as 10 to 15% back in 2004 but has improved since and most recently was 25 to 30% on last admission  CXR 3/7 unremarkable; does not appear volume overloaded   Patient was offered LifeVest but declined  On metoprolol succinate 25 mg twice daily, with plan to add an ARB as her blood pressure would allow  Strict I's and O's and weights to be checked every Monday Wednesday Friday  Follows with Dr Aly Ribera at [facility]      Seronegative arthropathy of multiple sites [facility])  [address] with rheumatology and is on Plaquenil 200 mg Monday through Friday and 400 mg on Saturday Sunday  Continue home regimen and follow-up with rheumatology on discharge    Asthma  Assessment & Plan  3/8 saturating well on RA/2L   3/7 afternoon - patient had questionable desat with therapy but had full improved mentation and was in no resp or other distress at that time  - Comgmt with IM who also evaluated patient at this time    - She historically has had difficulty obtaining O2 from finger tips at times - when pulse ox moved to toe she had good oxygenation on RA-2L  - CXR no acute findings - bibasilar scarring   - Continue use of albuterol inhaler 2 puffs every 6 hours as needed    Chronic pain syndrome  Assessment & Plan  History of chronic pain secondary to compression "fractures in her thoracic spine and chronic rib fractures   Patient with over sleepiness at times at sedating meds decreased some recently   · Holding standing long-acting MS Contin 15mg Q12H and transitioned to MS IR 7 5mg Q6H PRN > pain still adequately controlled > monitor closely   · Gabapentin 100mg BID   · Cymbalta 60 milligrams twice daily  · Acetaminophen 650 mg every 6 hours as needed  · Was previously on oxycodone as needed at [facility] but per family due to concerns for polypharmacy and additional opioids beyond her morphine has not been on an acute care since 2/16  · Lidoderm and Bengay  Aqua K also ordered with instructions not to place over same area  Appreciate IM consultants medical co-management  Labs, medications, and imaging personally reviewed  ROS:  A ten point review of systems was completed on 03/23/23 and pertinent positives are listed in subjective section  All other systems reviewed were negative  OBJECTIVE:   BP 96/64 Comment: Repeat Manual  Pulse 94   Temp 97 8 °F (36 6 °C) (Axillary)   Resp 20   Ht 5' 3\" (1 6 m)   Wt 62 5 kg (137 lb 12 6 oz)   SpO2 95%   BMI 24 41 kg/m²     Physical Exam  Vitals reviewed  Constitutional:       General: She is not in acute distress  Appearance: Normal appearance  HENT:      Head: Atraumatic  Comments: Left-sided facial droop     Right Ear: External ear normal       Left Ear: External ear normal       Nose: Nose normal  No rhinorrhea  Mouth/Throat:      Mouth: Mucous membranes are moist       Pharynx: Oropharynx is clear  Eyes:      General: No scleral icterus  Cardiovascular:      Rate and Rhythm: Normal rate  Pulses: Normal pulses  Heart sounds: Normal heart sounds  Pulmonary:      Effort: Pulmonary effort is normal  No respiratory distress  Breath sounds: Normal breath sounds  Abdominal:      General: There is no distension  Palpations: Abdomen is soft     Musculoskeletal: " Right lower leg: No edema  Left lower leg: No edema  Skin:     General: Skin is warm and dry  Coloration: Skin is pale  Neurological:      Mental Status: She is oriented to person, place, and time  Sensory: No sensory deficit        Comments: Dysarthria left-sided weakness consistent with prior examinations, 4/5 throughout more affecting the left hand   Psychiatric:         Mood and Affect: Mood normal          Behavior: Behavior normal           Lab Results   Component Value Date    WBC 7 43 03/20/2023    HGB 12 4 03/20/2023    HCT 39 0 03/20/2023    MCV 99 (H) 03/20/2023     03/20/2023     Lab Results   Component Value Date    SODIUM 143 03/20/2023    K 3 7 03/20/2023     (H) 03/20/2023    CO2 28 03/20/2023    BUN 13 03/20/2023    CREATININE 0 82 03/20/2023    GLUC 88 03/20/2023    CALCIUM 8 8 03/20/2023     Lab Results   Component Value Date    INR 0 95 02/22/2023    INR 0 90 02/12/2023    INR 1 12 04/22/2022    PROTIME 12 8 02/22/2023    PROTIME 12 1 02/12/2023    PROTIME 14 0 04/22/2022           Current Facility-Administered Medications:   •  acetaminophen (TYLENOL) tablet 650 mg, 650 mg, Oral, Q6H PRN, Polina Carton, DO, 650 mg at 03/23/23 9966  •  albuterol (PROVENTIL HFA,VENTOLIN HFA) inhaler 2 puff, 2 puff, Inhalation, Q6H PRN, Polina Carton, DO, 2 puff at 03/07/23 1358  •  aluminum-magnesium hydroxide-simethicone (MYLANTA) oral suspension 30 mL, 30 mL, Oral, Q4H PRN, Polina Carton, DO, 30 mL at 03/22/23 2055  •  apixaban (ELIQUIS) tablet 5 mg, 5 mg, Oral, BID, Polina Carton, DO, 5 mg at 03/23/23 7415  •  atorvastatin (LIPITOR) tablet 40 mg, 40 mg, Oral, QPM, Polina Carton, DO, 40 mg at 03/22/23 1719  •  bisacodyl (DULCOLAX) rectal suppository 10 mg, 10 mg, Rectal, Once, Maybelle Severance, MD  •  buPROPion [facility]) tablet 100 mg, 100 mg, Oral, BID, Polina Allen DO, 100 mg at 03/23/23 4747  •  cinacalcet (SENSIPAR) tablet 30 mg, 30 mg, Oral, Daily With Breakfast, Kolby Cardoso MD, 30 mg at 03/23/23 1960  •  DULoxetine (CYMBALTA) delayed release capsule 60 mg, 60 mg, Oral, BID, Celestia Fleming, DO, 60 mg at 03/23/23 5250  •  gabapentin (NEURONTIN) capsule 100 mg, 100 mg, Oral, BID, Vicky Bright MD, 100 mg at 03/23/23 1551  •  glycerin-hypromellose- (ARTIFICIAL TEARS) ophthalmic solution 1 drop, 1 drop, Both Eyes, Q4H PRN, Celestia Fleming, DO, 1 drop at 03/20/23 1832  •  hydroxychloroquine (PLAQUENIL) tablet 200 mg, 200 mg, Oral, Once per day on Mon Tue Wed Thu Fri, Carloz Paez, DO, 200 mg at 03/23/23 5809  •  hydroxychloroquine (PLAQUENIL) tablet 400 mg, 400 mg, Oral, Once per day on Sun Sat, Carloz Parnell, DO, 400 mg at 03/19/23 2019  •  lamoTRIgine (LaMICtal) tablet 150 mg, 150 mg, Oral, HS, Celestia Fleming, DO, 150 mg at 03/22/23 2100  •  lidocaine (LIDODERM) 5 % patch 1 patch, 1 patch, Topical, Daily, Rebecca Hood MD, 1 patch at 03/23/23 5312  •  LORazepam (ATIVAN) tablet 0 25 mg, 0 25 mg, Oral, BID PRN, Vicky Bright MD, 0 25 mg at 03/21/23 2215  •  melatonin tablet 3 mg, 3 mg, Oral, HS, Maddie Peters, CRNP, 3 mg at 03/22/23 2100  •  menthol-methyl salicylate (BENGAY) 41-50 % cream, , Apply externally, 4x Daily PRN, Rebecca Hood MD, Given at 03/06/23 1227  •  metoprolol succinate (TOPROL-XL) 24 hr tablet 25 mg, 25 mg, Oral, BID, Lanice Distad, CRNP, 25 mg at 03/22/23 1718  •  morphine (MSIR) IR tablet 7 5 mg, 7 5 mg, Oral, Q6H PRN, Vicky Bright MD, 7 5 mg at 03/23/23 1047  •  ondansetron (ZOFRAN-ODT) dispersible tablet 4 mg, 4 mg, Oral, Q6H PRN, Douglas Fleming DO, 4 mg at 03/22/23 6796  •  pantoprazole (PROTONIX) EC tablet 40 mg, 40 mg, Oral, Early Morning, Michael Gomez MD, 40 mg at 03/23/23 0557  •  polyethylene glycol (MIRALAX) packet 17 g, 17 g, Oral, Daily PRN, Douglas Fleming DO, 17 g at 03/04/23 1930  •  prazosin (MINIPRESS) capsule 1 mg, 1 mg, Oral, HS, Douglas Fleming DO, 1 mg at 03/22/23 2100  •  senna-docusate sodium (SENOKOT S) 8 6-50 mg per tablet 1 tablet, 1 tablet, Oral, BID, Anel Arrieta DO, 1 tablet at 03/23/23 8639  •  sodium chloride (OCEAN) 0 65 % nasal spray 1 spray, 1 spray, Each Nare, Q1H PRN, Kenyon Maldonado MD, 1 spray at 03/08/23 0300  •  white petrolatum-mineral oil (EUCERIN,HYDROCERIN) cream, , Topical, TID, Kenyon Maldonado MD, 1 application   at 03/23/23 0853    Past Medical History:   Diagnosis Date   • Anxiety    • Asthma    • Bipolar depression ([place] [address] )    • Chronic narcotic dependence ([place] [address] )    • Chronic pain    • Depression    • Enterovirus heart infection    • SHAR (obstructive sleep apnea)    • Osteoarthritis    • Peripheral neuropathy    • Plantar fasciitis of right foot    • Senile osteoporosis    • Seronegative arthropathy of multiple sites [facility])    • Undifferentiated connective tissue disease ([place] [address] )        Patient Active Problem List    Diagnosis Date Noted   • Acute CVA (cerebrovascular accident) ([place] [address] ) 02/23/2023   • Hypokalemia 03/10/2023   • AMS (altered mental status) 03/09/2023   • Gout of foot 03/09/2023   • Dysphagia 03/09/2023   • Wound of left ankle 03/09/2023   • UTI (urinary tract infection) 03/06/2023   • Obstructive sleep apnea 02/28/2023   • History of stroke 02/28/2023   • History of bacteremia 02/24/2023   • Hypercalcemia 02/22/2023   • Discoloration of skin of foot 02/22/2023   • Abdominal pain 02/22/2023   • HFrEF (heart failure with reduced ejection fraction) ([place] [address] ) 02/13/2023   • Stroke ([place] [address] ) 02/12/2023   • HTN (hypertension) 02/12/2023   • Cardiomyopathy ([place] [address] ) 02/07/2023   • Vitamin D insufficiency 02/07/2023   • LAILA (acute kidney injury) ([place] [address] ) 04/23/2022   • Polypharmacy 04/23/2022   • 'light-for-dates' infant with signs of fetal malnutrition 04/22/2022   • Bipolar depression ([place] [address] ) 03/04/2022   • Rheumatoid arthritis ([facility] ) 03/04/2022   • Closed fracture of multiple ribs of right side 03/02/2022   • Traumatic pneumothorax 03/02/2022   • Closed fracture of transverse process of lumbar vertebra ([place] [address] ) 03/02/2022   • Liver contusion 03/02/2022   • Fall 03/02/2022   • Acute pain due to trauma 03/02/2022   • Right shoulder pain 03/02/2022   • Severe protein-calorie malnutrition ([place] [address] ) 03/02/2022   • Undifferentiated connective tissue disease ([place] [address] ) 12/29/2021   • Primary generalized (osteo)arthritis 12/29/2021   • Peripheral neuropathy 12/29/2021   • Osteoporosis 12/29/2021   • Hx of compression fracture of spine 12/29/2021   • Lumbar spondylosis 12/29/2021   • Cervical spondylosis 12/29/2021   • Anemia 06/26/2021   • Chronic pain syndrome    • Asthma    • Ambulatory dysfunction    • Seronegative arthropathy of multiple sites ([place] [address] )    • Gastroesophageal reflux disease without esophagitis    • Mixed stress and urge urinary incontinence    • CHF (congestive heart failure) ([place] [address] )         Linda Escalante,   Physical Medicine and [facility] [address]    I have spent a total time of 25 minutes on 03/23/23 in caring for this patient including Counseling / Coordination of care, Documenting in the medical record and Communicating with other healthcare professionals

## 2023-03-23 NOTE — PROGRESS NOTES
"   03/23/23 0700   Pain Assessment   Pain Assessment Tool 0-10   Pain Score 6   Pain Location/Orientation Orientation: Lower; Location: Back   Patient's Stated Pain Goal No pain   Hospital Pain Intervention(s) Shower/Bath;Emotional support; Rest   Restrictions/Precautions   Precautions Aspiration;Bed/chair alarms; Fall Risk;Supervision on toilet/commode  (L side inattention)   Weight Bearing Restrictions No   ROM Restrictions No   Lifestyle   Autonomy \"I think I am leaving today\"  Oral Hygiene   Type of Assistance Needed Physical assistance   Physical Assistance Level 25% or less   Comment Min A in supported stance at sink for balance/safety  Pt able to take off toothpaste cap and apply on brush with inc time  Oral Hygiene CARE Score 3   Grooming   Findings Pt complete application of detangler and brushing of hair with LUE  Pt able to complete front and sides of hair, req assistance with untangling hair on back of head  Demo dec ability to maintain grasp on brush with LUE, inc time to complete task  Shower/Bathe Self   Type of Assistance Needed Physical assistance   Physical Assistance Level 25% or less   Comment Pt complete shower while seated in tub bench  Pt able to wash 10/10 parts, however, requiring max verbal cueing throughout shower for initiation  Pt req min A in stance when completing santiago/rear hygiene  One occasion of LOB while in stance due to Pt performing bilateral release from grab bar, req assistance to correct  Educ provided on importance of maintaining single hand contact on grab bar when in stance  Shower/Bathe Self CARE Score 3   Tub/Shower Transfer   Findings Pt complete stand pivot transfer with min A and use of grab bar for additional balance support  Pt req hand over hand assist for placement of LUE on grab bar and WC prior to initiating     Upper Body Dressing   Type of Assistance Needed Physical assistance   Physical Assistance Level 26%-50%   Comment Pt don long sleeve shirt while " seated in WC  Pt req assistance for correct orientation of shirt and finding of sleeves  Pt then able to thread arms using hemidressing technique  Verbal cues provided for pulling all the way up to shoulders prior to donning overhead  Pt then pull overhead, and req assistance with pulling shirt down over trunk  Upper Body Dressing CARE Score 3   Lower Body Dressing   Type of Assistance Needed Physical assistance   Physical Assistance Level 25% or less   Comment Pt thread underwear and pants while seated in WC using xleg technique  Inc time required for threading due to dec  strength and coordination of LUE  Pt then stance with use of grab bar and attempt CM over hips  Pt able to successfully pull up over hips on R side, however, req min A for L side and in back  Lower Body Dressing CARE Score 3   Putting On/Taking Off Footwear   Type of Assistance Needed Physical assistance   Physical Assistance Level 25% or less   Comment Seated in WC, Pt able to doff slipper socks with x leg technique  Pt then able to don slipper socks with xleg technique  Verbal cues required for utilizing LUE during task  Min A required for maintaining LUE  on socks while donning  Pt then able to complete with inc time  Eucerin applied to dry skin on BLE, noted irritation on tops of feet  RN made aware  Putting On/Taking Off Footwear CARE Score 3   Lying to Sitting on Side of Bed   Type of Assistance Needed Supervision   Physical Assistance Level No physical assistance   Comment Pt able to move from lying with HOB elevated to sitting EOB with inc time and use of bedrail  CS provided  Lying to Sitting on Side of Bed CARE Score 4   Sit to Stand   Type of Assistance Needed Physical assistance   Physical Assistance Level 25% or less   Comment With RW, min A for steadying  Verbal and tactile cues provided for hand placement on RW/WC throughout transfer     Sit to Stand CARE Score 3   Bed-Chair Transfer   Type of Assistance Needed Physical assistance   Physical Assistance Level 25% or less   Comment WC to recliner chair with RW  min A for steadying, verbal cues for proper hand placement throughout transfer  Chair/Bed-to-Chair Transfer CARE Score 3   Toileting Hygiene   Type of Assistance Needed Physical assistance   Physical Assistance Level 25% or less   Comment Pt complete hygiene while seated on toilet  Pt then stand to perform CM over hips  Verbal cues required for use of LUE during CM  Ultimately requiring min A for CM on left side and back  Toileting Hygiene CARE Score 3   Toilet Transfer   Type of Assistance Needed Physical assistance   Physical Assistance Level 25% or less   Comment min A with RW for steadying and use of grab bar   Toilet Transfer CARE Score 3   Cognition   Overall Cognitive Status Impaired   Arousal/Participation Alert; Cooperative   Attention Attends with cues to redirect   Orientation Level Oriented X4   Memory Decreased short term memory;Decreased recall of recent events;Decreased recall of precautions   Following Commands Follows one step commands with increased time or repetition   Activity Tolerance   Activity Tolerance Patient tolerated treatment well   Medical Staff Made Aware RN made aware of irritation on b/l feet and need for allevyn to be applied on heels  Assessment   Treatment Assessment Pt participated in 90 minute OT session with focus on ADL retraining and functional transfers  Pt tolerated treatment well, however, req mod-max verbal cues throughout session regarding task initiation and functional use of LUE  Pt benefitting from inc verbal motivation and hand over hand placement of LUE on transfer surfaces  Pt demo inc anxiety regarding transfer to TCU scheduled for tomorrow, Pt benefitting from reassurance regarding transfer process  Pt would benefit from continued OT treatment focused on ADL retraining, functional transfers, LUE NMR, standing tolerance/balance, endurance, safety awareness  Prognosis Fair   Problem List Decreased strength;Decreased endurance;Decreased range of motion; Impaired balance;Decreased mobility; Decreased coordination;Decreased cognition; Impaired judgement;Decreased safety awareness   Plan   Treatment/Interventions ADL retraining;Functional transfer training; Therapeutic exercise; Endurance training;Cognitive reorientation;Patient/family training;Equipment eval/education; Compensatory technique education   Progress Progressing toward goals   Recommendation   OT Discharge Recommendation   (TCU)   OT Therapy Minutes   OT Time In 0700   OT Time Out 0830   OT Total Time (minutes) 90   OT Mode of treatment - Individual (minutes) 90   OT Mode of treatment - Concurrent (minutes) 0   OT Mode of treatment - Group (minutes) 0   OT Mode of treatment - Co-treat (minutes) 0   OT Mode of Treatment - Total time(minutes) 90 minutes   OT Cumulative Minutes 1920   Therapy Time missed   Time missed?  No

## 2023-03-23 NOTE — PROGRESS NOTES
Internal Medicine Progress Note  Patient: Rocky Veloz  Age/sex: 75 y o  female  Medical Record #: [id number]      ASSESSMENT/PLAN: (Interval History)  Rocky Veloz is seen and examined and management for following issues:    Recent right MCA infarct W/petechial hemorrhage  • S/P thrombectomy by Dr Stefany Shah 2/12  • Continue Eliquis 5 mg twice daily/statin   • Follow-up with neurosurgery/neurology as scheduled     NICM  • EF has been as low as 10 to 15% in the past  • In 2022 was 65%  • Now down to 30% = pt declined LifeVest  • Cardiology felt this was secondary to her recent CVA as well as possible medication noncompliance  • They recommend continuing beta-blocker and adding ARB when BP allows  • Will need outpatient ischemia work-up  • Followed by Dr Patrick Summers at [facility] however would like to follow with [facility] on dc  • Can add arb if bp improves     Seronegative rheumatoid arthritis  • A/T rheumatology takes Plaquenil 200 mg Monday through Friday 400 mg on Saturday and Sunday  • Continue same  • Follow-up on discharge     Hypercalcemia  · Seen by renal/endocrine on acute side  · Started on Sensipar and now down to 8 8  · Endocrine input appreciated  Will need outpt follow-up and potentially a surgical evaluation      Fall  · 3/6/23 onto left side  · Abrasion on left knee and Xray negative for fracture  · CT and Xrays negative     R great toe gout  · S/p prednisone  · Uric acid normal  · improved     Chronic pain  • Due to compression fractures of her thoracic spine/chronic rib fractures  • Lethargic 3/7 and MS contin changed to IR  • CONT OFF MS CONTIN  • Generally takes about 1-2 doses daily  • Pain management per PMR  • Pain stable     Bipolar disorder  • Continue current medication regimen  • Follow-up with psychiatry on discharge  • Neuropsych following  • stable    Anxiety  · Moods are stable    Confusion  · Improved  · Off continuous observation    SHAR  • Does not use CPAP at home d/t claustraphobia  • OK use oxygen overnight     Dysuria/Urinary frequency/incontinence  • Urine culture +CRE  • s/p vantin 200mg bid through 3/9/23  • resolved    Hypokalemia  · stable      DC planning: await SNF acceptance    The above assessment and plan was reviewed and updated as determined by my evaluation of the patient on 3/23/2023      Labs:   Results from last 7 days   Lab Units 03/20/23  0558   WBC Thousand/uL 7 43   HEMOGLOBIN g/dL 12 4   HEMATOCRIT % 39 0   PLATELETS Thousands/uL 213     Results from last 7 days   Lab Units 03/20/23  0558   SODIUM mmol/L 143   POTASSIUM mmol/L 3 7   CHLORIDE mmol/L 111*   CO2 mmol/L 28   BUN mg/dL 13   CREATININE mg/dL 0 82   CALCIUM mg/dL 8 8                   Review of Scheduled Meds:  Current Facility-Administered Medications   Medication Dose Route Frequency Provider Last Rate   • acetaminophen  650 mg Oral Q6H PRN Hezzie Cruz, DO     • albuterol  2 puff Inhalation Q6H PRN Heranjitie Cruz, DO     • aluminum-magnesium hydroxide-simethicone  30 mL Oral Q4H PRN Hezzie Cruz, DO     • apixaban  5 mg Oral BID Hezzie Cruz, DO     • atorvastatin  40 mg Oral QPM Hezzie Cruz, DO     • bisacodyl  10 mg Rectal Once Juvenal Turner MD     • buPROPion  100 mg Oral BID Hezzie Cruz, DO     • cinacalcet  30 mg Oral Daily With Breakfast Anika Davila MD     • DULoxetine  60 mg Oral BID Hezzie Cruz, DO     • gabapentin  100 mg Oral BID Juvenal Turner MD     • glycerin-hypromellose-  1 drop Both Eyes Q4H PRN Hezzie Cruz, DO     • hydroxychloroquine  200 mg Oral Once per day on Mon Tue Wed Thu Fri Hezzie Cruz, DO     • hydroxychloroquine  400 mg Oral Once per day on Sun Sat Hezzie Cruz, DO     • lamoTRIgine  150 mg Oral HS Hezzie Cruz, DO     • lidocaine  1 patch Topical Daily Candy Stevens MD     • LORazepam  0 25 mg Oral BID PRN Juvenal Turner MD     • melatonin  3 mg Oral HS LAURA Diaz     • menthol-methyl salicylate   Apply externally 4x Daily PRN Zee Hatr MD     • metoprolol succinate  25 mg Oral BID LAURA Viera     • morphine  7 5 mg Oral Q6H PRN Alfreda Ortega MD     • ondansetron  4 mg Oral Q6H PRN Nickyisidro Timmons, DO     • pantoprazole  40 mg Oral Early Morning Eriberto Donnelly MD     • polyethylene glycol  17 g Oral Daily PRN Nicky Shanelle, DO     • prazosin  1 mg Oral HS Nicky Shanelle, DO     • senna-docusate sodium  1 tablet Oral BID Nicky Shanelle, DO     • sodium chloride  1 spray Each Nare Q1H PRN Dexter Tijerina MD     • white petrolatum-mineral oil   Topical TID Dexter Tijerina MD         Subjective/ HPI: Patient seen and examined  Patients overnight issues or events were reviewed with nursing or staff during rounds or morning huddle session  New or overnight issues include the following:     Pt seen in her room  She will be discharged to [facility] tomorrow for further rehab  Her BP was soft this AM  She denies any symptoms of low BP  She was encouraged to increase po fluid intake today  She denies any current complaints  ROS:   A 10 point ROS was performed; negative except as noted above  Imaging:     FL barium swallow video w speech   Final Result by SYSTEMGENERATED, DOCUMENTATION (03/09 1306)      FL barium swallow video w speech   Final Result by  (03/09 1550)      XR hip/pelv 2-3 vws left if performed   Final Result by Yamil Aguirre MD (03/08 1633)      No acute osseous abnormality  Workstation performed: [id number]         XR shoulder 2+ vw left   Final Result by Yamil Aguirre MD (03/08 1635)      No acute osseous abnormality  Workstation performed: [id number]         XR spine cervical 2 or 3 vw injury   Final Result by Yamil Aguirre MD (03/08 8762)      No acute osseous abnormality or significant change from priors        Salt-and-pepper appearance of the skull is partially accentuated by the image processing /sharpening algorithm, though prior head CT suggests this is a true finding  Correlate for clinical/laboratory features of hyperparathyroidism  Workstation performed: [id number]         XR chest portable   Final Result by Dia Rahman MD (03/07 1650)      No acute cardiopulmonary disease  Workstation performed: [id number]         CT head wo contrast   Final Result by Marquis Stokes MD (03/07 5388)      Evolving right MCA territory infarcts with associated petechial hemorrhage which is best seen on the recent MRI examination  There is no discrete parenchymal hematoma  Workstation performed: [id number]         XR knee 1 or 2 vw left   Final Result by Dia Rahman MD (03/07 3697)      No acute osseous abnormality  Workstation performed: [id number]             *Labs /Radiology studies reviewed  *Medications reviewed and reconciled as needed  *Please refer to order section for additional ordered labs studies  *Case discussed with primary attending during morning huddle case rounds    Physical Examination:  Vitals:   Vitals:    03/22/23 1500 03/22/23 1718 03/22/23 2046 03/23/23 0534   BP: 103/59 106/60 112/62 104/62   BP Location: Right arm  Right arm Right arm   Pulse: 84 88 89 88   Resp: 18  18 20   Temp: 97 6 °F (36 4 °C)  97 6 °F (36 4 °C) 97 8 °F (36 6 °C)   TempSrc: Axillary  Axillary Axillary   SpO2:   96% 95%   Weight:       Height:           GEN: No apparent distress, pleasant  NEURO: Alert and oriented x3; remembers my name after not seeing me for a week    HEENT: Pupils are equal and reactive, EOMI, mucous membranes are moist, face symmetrical  CV: S1 S2 regular, no MRG, no peripheral edema noted  RESP: Lungs are clear bilaterally, no wheezes, rales or rhonchi noted, on room air, respirations easy and non labored  GI: Flat, soft non tender, non distended; +BS x4  : Voiding without difficulty  MUSC: Moves all extremities; generalized gait dysfunction  SKIN: skin is pale, poor turgor, scattered ecchymosis on b/l upper extremities  hands remain dry with scratch marks      The above physical exam was reviewed and updated as determined by my evaluation of the patient on 3/23/2023  Invasive Devices     None                    VTE Pharmacologic Prophylaxis: Eliquis  Code Status: Level 1 - Full Code  Current Length of Stay: 23 day(s)      Total time spent:  30 minutes  with more than 50% spent counseling/coordinating care  Counseling includes discussion with patient re: progress  and discussion with patient of his/her current medical state/information  Coordination of patient's care was performed in conjunction with primary service  Time invested included review of patient's labs, vitals, and management of their comorbidities with continued monitoring  In addition, this patient was discussed with medical team including physician and advanced extenders  The care of the patient was extensively discussed and appropriate treatment plan was formulated unique for this patient  Medical decision making for the day was made by supervising physician unless otherwise noted in their attestation statement  ** Please Note:  voice to text software may have been used in the creation of this document   Although proof errors in transcription or interpretation are a potential of such software**

## 2023-03-23 NOTE — PROGRESS NOTES
03/23/23 1230   Pain Assessment   Pain Assessment Tool 0-10   Pain Score 5   Pain Location/Orientation Location: Back   Pain Onset/Description Onset: Ongoing   Hospital Pain Intervention(s) Medication (See MAR); Repositioned   Restrictions/Precautions   Precautions Aspiration;Bed/chair alarms;Cognitive; Fall Risk;Supervision on toilet/commode;Visual deficit;Pain; Impulsive  (L side inattention)   Weight Bearing Restrictions No   ROM Restrictions No   Eating   Type of Assistance Needed Set-up / clean-up;Supervision;Verbal cues   Physical Assistance Level No physical assistance   Eating CARE Score 4   Swallow Assessment   Swallow Treatment Assessment Daily Dysphagia Tx Note      Patient Name: Angelina Matthews     CNAMP'C Date: 3/23/2023        Current Risks for Dysphagia & Aspiration: Weak voicing; weak cough; dysarthria; general debilitation; new neuro event; brain injury; cognitive deficit; hx neurologic dx; positioning issues     Current Symptoms/Concerns: Cough; throat clear; with liquids; during meals; with pills; difficulty chewing; decreased oral intake; hx of dysphagia/aspiration     Current diet: puree/level 1 diet and nectar thick liquids      Premorbid diet::upon d/c from prior admission, pt appeared to be on dysphagia level 2/thin liquids upon prior admission for stroke initially; unknown prior to admission, pt rx being on softer foods as well as thickened liquids in the past        Positioning: upright in recliner          Items administered:Consistencies Administered: nectar thick, soft solids  Materials administered included diced carrots, mashed potatoes, diced peaches, magic cup, pudding, and NTL by cup      Total amount of meal consumed:   100% dice peaches  50% magic cup  4 bites pudding  3 bites mashed potatoes  2 bites carrots  240cc NTL         Oral stage:  moderate  Lip closure: functional retrieval to utensil and cup sips   Anterior spillage: mildly present with soft solids and NTL Mastication: slow, prolonged and decreased manipulation of soft solids  Bolus formation: weaker, uncoordinated at times  Bolus control: suspect decreased posterior control with all consistencies  Transfer: decreased- lingual pumping present  Oral residue: trace between swallows with soft solid options  Pocketing: none          Pharyngeal stage: moderate   Swallow promptness: mild to moderate delayed, decreased attention to swallows required cues at times to ensure swallow before next bite  Hyolaryngeal elevation: suspect decreased   Wet voice: none   Throat clear: present more so towards end of meal with reflux   Cough: x2 noted after belching  Secondary swallows: encouraged double and effortful swallows with liquids and solids  Audible swallows: present with all swallows- more prominent with liquids  Esophageal stage:   Pt with increased s/s present this meal- observed reflux with coughing and anterior spill of previously swallowed food/liquids  Pt able to spit out and used suction to assist         Summary:     Pt presenting with moderate oropharyngeal dysphagia today  Symptoms or concerns included slow, prolonged, and insufficient chewing of soft solids which lead to decreased breakdown of soft solid textures, decreased bolus propulsion, decreased bolus formation, suspected decreased control of larger sips of NTL, piecemeal deglutition, suspected pharyngeal swallow delay, suspected decreased hyolaryngeal elevation, suspected pharyngeal residue, multiple swallows, effortful swallow, audible swallows and HIGH risk of bottom up aspiration provided prominent episodes of refluxed materials being spit/suctioned out anteriorly        Pt initially attempted to be seen at scheduled time however given increased fatigue and pt visibly falling asleep while setting up tray, deferred meal for an hour and transferred pt to bed to rest  Upon return to room an hour later, pt able to transfer back OOB to chair for meal, "much more awake, alert and appropriate  Pt provided set up assistance with tray, agreeable to some of the soft solid textures but overall pt appeared to stick with more pureed items  Pt conts with improved retrieval, minimal loss with more so secretions than full textures  Oral processing of soft solids conts to be moderately prolonged and disorganized given limited dentition  Pt encouraged thorough chewing before transfer and swallows  Swallows cont to be weak, suspect decreased and possibly incomplete as pt with occ hawking and rechewing items  Pt required cues to alternate food and liquids x1 during session, then independently completing throughout  NTL by cup appear with good retrieval and control, transfers and swallows fluctuating from prompt to mildly delayed  Swallows audible and cues needed for effortful swallows  Pt observed with increased reflux with liquids towards mid to end of meal, however pt conts to reach for liquid even when educated to stop, cough and clear, and wait before taking in more PO  Pt stating \"I do feel full\"- education on paying attention to these cues so that she doesn't reach point of over fullness and refluxing  Pt requesting cereal be offered to her between meals- explained diet levels and that pt is still in trial phase with soft solids however at next level of care (Pt to be transferred to [address] [place] [address] tomorrow) pt may work with therapist to determine items of preference and tolerance that are appropriate for her to be able to have some more advanced textures as appropriate  Oral care completed at end of meal with suction materials  No pocketing or retention of items present            Recommendations:  Recommended Diet:  puree/level 1 diet and nectar thick liquids by CUP ONLY-NO STRAWS  Recommended Form of Medications: whole with puree   Aspiration precautions and compensatory swallowing strategies: upright posture, only feed when fully alert, slow rate of feeding, small " bites/sips, no straws, effortful swallow, cough every 4-5 bites/sips, quiet environment (tv off, limit talking, door closed, etc ), alternating bites and sips, OOB for ALL meals, MUST be AWAKE/ALERT for meals  Oral care with suction w/ after ALL meals  Once oral care was completed, pt is allowed ICE CHIPS between meals  However, pt MUST be FULLY upright when consuming ice chips  FULL supervision w/ meals     Results reviewed with:  patient, nursing     Aspiration precautions posted  Reflux precautions-->Remain upright at least 30 min post meals     F/u ST tx: Pt is recommended for further skilled SLP services targeting dysphagia therapy in order to maximize oral and pharyngeal swallow skills, while safely supporting PO intake, as well as to improve independent carryover of safe swallow strategies  Plan: Continue puree diet and NTL by cup only            Increase trials of dysphagia level 2 items    Swallow Assessment Prognosis   Prognosis Fair   Prognosis Considerations Age; Co-morbidities; Medical diagnosis; Medical prognosis; Severity of impairments;New learning ability;Ability to carry over; Cooperation   SLP Therapy Minutes   SLP Time In 4832   SLP Time Out 1330   SLP Total Time (minutes) 60   SLP Mode of treatment - Individual (minutes) 60   SLP Mode of treatment - Concurrent (minutes) 0   SLP Mode of treatment - Group (minutes) 0   SLP Mode of treatment - Co-treat (minutes) 0   SLP Mode of Treatment - Total time(minutes) 60 minutes   SLP Cumulative Minutes 4819   Therapy Time missed   Time missed?  No

## 2023-03-23 NOTE — PLAN OF CARE
Problem: PAIN - ADULT  Goal: Verbalizes/displays adequate comfort level or baseline comfort level  Description: Interventions:  - Encourage patient to monitor pain and request assistance  - Assess pain using appropriate pain scale  - Administer analgesics based on type and severity of pain and evaluate response  - Implement non-pharmacological measures as appropriate and evaluate response  - Consider cultural and social influences on pain and pain management  - Notify physician/advanced practitioner if interventions unsuccessful or patient reports new pain  Outcome: Progressing     Problem: INFECTION - ADULT  Goal: Absence or prevention of progression during hospitalization  Description: INTERVENTIONS:  - Assess and monitor for signs and symptoms of infection  - Monitor lab/diagnostic results  - Monitor all insertion sites, i e  indwelling lines, tubes, and drains  - Monitor endotracheal if appropriate and nasal secretions for changes in amount and color  - [place] appropriate cooling/warming therapies per order  - Administer medications as ordered  - Instruct and encourage patient and family to use good hand hygiene technique  - Identify and instruct in appropriate isolation precautions for identified infection/condition  Outcome: Progressing  Goal: Absence of fever/infection during neutropenic period  Description: INTERVENTIONS:  - Monitor WBC    Outcome: Progressing     Problem: SAFETY ADULT  Goal: Patient will remain free of falls  Description: INTERVENTIONS:  - Educate patient/family on patient safety including physical limitations  - Instruct patient to call for assistance with activity   - Consult OT/PT to assist with strengthening/mobility   - Keep Call bell within reach  - Keep bed low and locked with side rails adjusted as appropriate  - Keep care items and personal belongings within reach  - Initiate and maintain comfort rounds  - Make Fall Risk Sign visible to staff  - Offer Toileting every 2 Hours, in advance of need  - Initiate/Maintain bed/chair alarm  - Obtain necessary fall risk management equipment: nonskid socks  - Apply yellow socks and bracelet for high fall risk patients  - Consider moving patient to room near nurses station  Outcome: Progressing  Goal: Maintain or return to baseline ADL function  Description: INTERVENTIONS:  -  Assess patient's ability to carry out ADLs; assess patient's baseline for ADL function and identify physical deficits which impact ability to perform ADLs (bathing, care of mouth/teeth, toileting, grooming, dressing, etc )  - Assess/evaluate cause of self-care deficits   - Assess range of motion  - Assess patient's mobility; develop plan if impaired  - Assess patient's need for assistive devices and provide as appropriate  - Encourage maximum independence but intervene and supervise when necessary  - Involve family in performance of ADLs  - Assess for home care needs following discharge   - Consider OT consult to assist with ADL evaluation and planning for discharge  - Provide patient education as appropriate  Outcome: Progressing  Goal: Maintains/Returns to pre admission functional level  Description: INTERVENTIONS:  - Perform BMAT or MOVE assessment daily    - Set and communicate daily mobility goal to care team and patient/family/caregiver  - Collaborate with rehabilitation services on mobility goals if consulted  - Perform Range of Motion 3 times a day  - Reposition patient every 2 hours    - Dangle patient 3 times a day  - Stand patient 3 times a day  - Ambulate patient 3 times a day  - Out of bed to chair 3 times a day   - Out of bed for meals 3 times a day  - Out of bed for toileting  - Record patient progress and toleration of activity level   Outcome: Progressing     Problem: DISCHARGE PLANNING  Goal: Discharge to home or other facility with appropriate resources  Description: INTERVENTIONS:  - Identify barriers to discharge w/patient and caregiver  - Arrange for needed discharge resources and transportation as appropriate  - Identify discharge learning needs (meds, wound care, etc )  - Arrange for interpretive services to assist at discharge as needed  - Refer to Case Management Department for coordinating discharge planning if the patient needs post-hospital services based on physician/advanced practitioner order or complex needs related to functional status, cognitive ability, or social support system  Outcome: Progressing     Problem: Nutrition/Hydration-ADULT  Goal: Nutrient/Hydration intake appropriate for improving, restoring or maintaining nutritional needs  Description: Monitor and assess patient's nutrition/hydration status for malnutrition  Collaborate with interdisciplinary team and initiate plan and interventions as ordered  Monitor patient's weight and dietary intake as ordered or per policy  Utilize nutrition screening tool and intervene as necessary  Determine patient's food preferences and provide high-protein, high-caloric foods as appropriate       INTERVENTIONS:  - Monitor oral intake, urinary output, labs, and treatment plans  - Assess nutrition and hydration status and recommend course of action  - Evaluate amount of meals eaten  - Assist patient with eating if necessary   - Allow adequate time for meals  - Recommend/ encourage appropriate diets, oral nutritional supplements, and vitamin/mineral supplements  - Order, calculate, and assess calorie counts as needed  - Recommend, monitor, and adjust tube feedings and TPN/PPN based on assessed needs  - Assess need for intravenous fluids  - Provide specific nutrition/hydration education as appropriate  - Include patient/family/caregiver in decisions related to nutrition  Outcome: Progressing     Problem: Prexisting or High Potential for Compromised Skin Integrity  Goal: Skin integrity is maintained or improved  Description: INTERVENTIONS:  - Identify patients at risk for skin breakdown  - Assess and monitor skin integrity  - Assess and monitor nutrition and hydration status  - Monitor labs   - Assess for incontinence   - Turn and reposition patient  - Assist with mobility/ambulation  - Relieve pressure over bony prominences  - Avoid friction and shearing  - Provide appropriate hygiene as needed including keeping skin clean and dry  - Evaluate need for skin moisturizer/barrier cream  - Collaborate with interdisciplinary team   - Patient/family teaching  - Consider wound care consult   Outcome: Progressing     Problem: MOBILITY - ADULT  Goal: Maintain or return to baseline ADL function  Description: INTERVENTIONS:  -  Assess patient's ability to carry out ADLs; assess patient's baseline for ADL function and identify physical deficits which impact ability to perform ADLs (bathing, care of mouth/teeth, toileting, grooming, dressing, etc )  - Assess/evaluate cause of self-care deficits   - Assess range of motion  - Assess patient's mobility; develop plan if impaired  - Assess patient's need for assistive devices and provide as appropriate  - Encourage maximum independence but intervene and supervise when necessary  - Involve family in performance of ADLs  - Assess for home care needs following discharge   - Consider OT consult to assist with ADL evaluation and planning for discharge  - Provide patient education as appropriate  Outcome: Progressing  Goal: Maintains/Returns to pre admission functional level  Description: INTERVENTIONS:  - Perform BMAT or MOVE assessment daily    - Set and communicate daily mobility goal to care team and patient/family/caregiver  - Collaborate with rehabilitation services on mobility goals if consulted  - Perform Range of Motion 3 times a day  - Reposition patient every 2 hours    - Dangle patient 3 times a day  - Stand patient 3 times a day  - Ambulate patient 3 times a day  - Out of bed to chair 3 times a day   - Out of bed for meals 3 times a day  - Out of bed for toileting  - Record patient progress and toleration of activity level   Outcome: Progressing

## 2023-03-24 LAB — SARS-COV-2 RNA RESP QL NAA+PROBE: NEGATIVE

## 2023-03-24 RX ORDER — MORPHINE SULFATE 15 MG/1
7.5 TABLET ORAL 2 TIMES DAILY PRN
Qty: 30 TABLET | Refills: 0 | Status: ON HOLD | OUTPATIENT
Start: 2023-03-24

## 2023-03-24 RX ORDER — LANOLIN ALCOHOL/MO/W.PET/CERES
3 CREAM (GRAM) TOPICAL
Refills: 0 | Status: ON HOLD
Start: 2023-03-24

## 2023-03-24 RX ORDER — LORAZEPAM 0.5 MG/1
0.25 TABLET ORAL 2 TIMES DAILY PRN
Qty: 20 TABLET | Refills: 0 | Status: ON HOLD | OUTPATIENT
Start: 2023-03-24

## 2023-03-24 RX ORDER — MUSCLE RUB CREAM 100; 150 MG/G; MG/G
CREAM TOPICAL 4 TIMES DAILY PRN
Refills: 0 | Status: ON HOLD
Start: 2023-03-24

## 2023-03-24 RX ORDER — ONDANSETRON 4 MG/1
4 TABLET, ORALLY DISINTEGRATING ORAL EVERY 6 HOURS PRN
Qty: 20 TABLET | Refills: 0 | Status: ON HOLD
Start: 2023-03-24

## 2023-03-24 RX ORDER — PANTOPRAZOLE SODIUM 40 MG/1
40 TABLET, DELAYED RELEASE ORAL
Refills: 0 | Status: ON HOLD
Start: 2023-03-25

## 2023-03-24 RX ORDER — LANOLIN ALCOHOL/MO/W.PET/CERES
CREAM (GRAM) TOPICAL 3 TIMES DAILY
Refills: 0 | Status: ON HOLD
Start: 2023-03-24

## 2023-03-24 RX ORDER — GABAPENTIN 100 MG/1
100 CAPSULE ORAL 2 TIMES DAILY
Refills: 0 | Status: ON HOLD
Start: 2023-03-24

## 2023-03-24 RX ORDER — MAGNESIUM HYDROXIDE/ALUMINUM HYDROXICE/SIMETHICONE 120; 1200; 1200 MG/30ML; MG/30ML; MG/30ML
30 SUSPENSION ORAL EVERY 4 HOURS PRN
Refills: 0 | Status: ON HOLD
Start: 2023-03-24

## 2023-03-24 RX ORDER — LIDOCAINE 50 MG/G
1 PATCH TOPICAL DAILY
Refills: 0 | Status: ON HOLD
Start: 2023-03-25

## 2023-03-24 RX ORDER — PRAZOSIN HYDROCHLORIDE 1 MG/1
1 CAPSULE ORAL
Refills: 0 | Status: ON HOLD
Start: 2023-03-24

## 2023-03-24 RX ORDER — ECHINACEA PURPUREA EXTRACT 125 MG
1 TABLET ORAL
Refills: 0 | Status: ON HOLD
Start: 2023-03-24

## 2023-03-24 RX ORDER — CINACALCET 30 MG/1
30 TABLET, FILM COATED ORAL
Refills: 0 | Status: ON HOLD
Start: 2023-03-25

## 2023-03-24 RX ADMIN — Medication: at 17:49

## 2023-03-24 RX ADMIN — MORPHINE SULFATE 7.5 MG: 15 TABLET ORAL at 22:18

## 2023-03-24 RX ADMIN — HYDROXYCHLOROQUINE SULFATE 200 MG: 200 TABLET ORAL at 10:14

## 2023-03-24 RX ADMIN — SENNOSIDES AND DOCUSATE SODIUM 1 TABLET: 8.6; 5 TABLET ORAL at 10:11

## 2023-03-24 RX ADMIN — DULOXETINE HYDROCHLORIDE 60 MG: 60 CAPSULE, DELAYED RELEASE ORAL at 10:10

## 2023-03-24 RX ADMIN — ACETAMINOPHEN 650 MG: 325 TABLET ORAL at 14:51

## 2023-03-24 RX ADMIN — DULOXETINE HYDROCHLORIDE 60 MG: 60 CAPSULE, DELAYED RELEASE ORAL at 21:46

## 2023-03-24 RX ADMIN — LAMOTRIGINE 150 MG: 100 TABLET ORAL at 21:46

## 2023-03-24 RX ADMIN — APIXABAN 5 MG: 5 TABLET, FILM COATED ORAL at 17:50

## 2023-03-24 RX ADMIN — MORPHINE SULFATE 7.5 MG: 15 TABLET ORAL at 10:16

## 2023-03-24 RX ADMIN — GABAPENTIN 100 MG: 100 CAPSULE ORAL at 17:50

## 2023-03-24 RX ADMIN — MELATONIN 3 MG: at 21:46

## 2023-03-24 RX ADMIN — APIXABAN 5 MG: 5 TABLET, FILM COATED ORAL at 10:11

## 2023-03-24 RX ADMIN — SENNOSIDES AND DOCUSATE SODIUM 1 TABLET: 8.6; 5 TABLET ORAL at 17:50

## 2023-03-24 RX ADMIN — METOPROLOL SUCCINATE 25 MG: 25 TABLET, EXTENDED RELEASE ORAL at 17:50

## 2023-03-24 RX ADMIN — BUPROPION HYDROCHLORIDE TABLETS 100 MG: 100 TABLET, FILM COATED ORAL at 17:55

## 2023-03-24 RX ADMIN — LORAZEPAM 0.25 MG: 0.5 TABLET ORAL at 22:18

## 2023-03-24 RX ADMIN — Medication 1 APPLICATION.: at 21:53

## 2023-03-24 RX ADMIN — Medication: at 10:23

## 2023-03-24 RX ADMIN — ATORVASTATIN CALCIUM 40 MG: 40 TABLET, FILM COATED ORAL at 17:50

## 2023-03-24 RX ADMIN — ALUMINUM HYDROXIDE, MAGNESIUM HYDROXIDE, AND DIMETHICONE 30 ML: 200; 20; 200 SUSPENSION ORAL at 19:57

## 2023-03-24 RX ADMIN — CINACALCET 30 MG: 30 TABLET, FILM COATED ORAL at 10:14

## 2023-03-24 RX ADMIN — BUPROPION HYDROCHLORIDE TABLETS 100 MG: 100 TABLET, FILM COATED ORAL at 10:14

## 2023-03-24 RX ADMIN — PANTOPRAZOLE SODIUM 40 MG: 40 TABLET, DELAYED RELEASE ORAL at 05:56

## 2023-03-24 RX ADMIN — GABAPENTIN 100 MG: 100 CAPSULE ORAL at 10:10

## 2023-03-24 RX ADMIN — METOPROLOL SUCCINATE 25 MG: 25 TABLET, EXTENDED RELEASE ORAL at 10:11

## 2023-03-24 RX ADMIN — LIDOCAINE 5% 1 PATCH: 700 PATCH TOPICAL at 10:12

## 2023-03-24 NOTE — PROGRESS NOTES
Internal Medicine Progress Note  Patient: Yamil Wiggins  Age/sex: 75 y o  female  Medical Record #: [id number]      ASSESSMENT/PLAN: (Interval History)  Yamil Wiggins is seen and examined and management for following issues:    Recent right MCA infarct W/petechial hemorrhage  • S/P thrombectomy by Dr Wilton Connors 2/12  • Continue Eliquis 5 mg twice daily/statin   • Follow-up with neurosurgery/neurology as scheduled     NICM  • EF has been as low as 10 to 15% in the past  • In 2022 was 65%  • Now down to 30% = pt declined LifeVest  • Cardiology felt this was secondary to her recent CVA as well as possible medication noncompliance  • They recommend continuing beta-blocker and adding ARB when BP allows  • Will need outpatient ischemia work-up  • Followed by Dr Royal Edwards at [facility] however would like to follow with [facility] on dc     Seronegative rheumatoid arthritis  • A/T rheumatology takes Plaquenil 200 mg Monday through Friday 400 mg on Saturday and Sunday  • Continue same  • Follow-up on discharge     Hypercalcemia  · Seen by renal/endocrine on acute side  · Started on Sensipar and now down to 8 8  · Endocrine input appreciated  Will need outpt follow-up and potentially a surgical evaluation      Fall  · 3/6/23 onto left side  · Abrasion on left knee and Xray negative for fracture  · CT and Xrays negative     R great toe gout  · S/p prednisone  · Uric acid normal  · improved     Chronic pain  • Due to compression fractures of her thoracic spine/chronic rib fractures  • Lethargic 3/7 and MS contin changed to IR  • CONT OFF MS CONTIN  • Generally takes about 1-2 doses daily  • Pain management per PMR  • Pain stable     Bipolar disorder  • Continue current medication regimen  • Follow-up with psychiatry on discharge  • Neuropsych following  • stable    Anxiety  · Moods are stable    Confusion  · Improved  · Off continuous observation    SHAR  • Does not use CPAP at home d/t claustraphobia  • OK use oxygen overnight     Dysuria/Urinary frequency/incontinence  • Urine culture +CRE  • s/p vantin 200mg bid through 3/9/23  • resolved    Hypokalemia  · stable      DC planning: Plan to DC to TCU tomorrow due to bed availability  The above assessment and plan was reviewed and updated as determined by my evaluation of the patient on 3/24/2023      Labs:   Results from last 7 days   Lab Units 03/20/23  0558   WBC Thousand/uL 7 43   HEMOGLOBIN g/dL 12 4   HEMATOCRIT % 39 0   PLATELETS Thousands/uL 213     Results from last 7 days   Lab Units 03/20/23  0558   SODIUM mmol/L 143   POTASSIUM mmol/L 3 7   CHLORIDE mmol/L 111*   CO2 mmol/L 28   BUN mg/dL 13   CREATININE mg/dL 0 82   CALCIUM mg/dL 8 8                   Review of Scheduled Meds:  Current Facility-Administered Medications   Medication Dose Route Frequency Provider Last Rate   • acetaminophen  650 mg Oral Q6H PRN Peg Likens, DO     • albuterol  2 puff Inhalation Q6H PRN Peg Likens, DO     • aluminum-magnesium hydroxide-simethicone  30 mL Oral Q4H PRN Peg Likens, DO     • apixaban  5 mg Oral BID Peg Likens, DO     • atorvastatin  40 mg Oral QPM Peg Likens, DO     • bisacodyl  10 mg Rectal Once Nestor Pete MD     • buPROPion  100 mg Oral BID Peg Likens, DO     • cinacalcet  30 mg Oral Daily With Breakfast Anika Martinez MD     • DULoxetine  60 mg Oral BID Peg Likens, DO     • gabapentin  100 mg Oral BID Nestor Pete MD     • glycerin-hypromellose-  1 drop Both Eyes Q4H PRN Peg Likens, DO     • hydroxychloroquine  200 mg Oral Once per day on Mon Tue Wed Thu Fri Peg Likens, DO     • hydroxychloroquine  400 mg Oral Once per day on Sun Sat Peg Likens, DO     • lamoTRIgine  150 mg Oral HS Peg Likens, DO     • lidocaine  1 patch Topical Daily Guillermo Smiley MD     • LORazepam  0 25 mg Oral BID PRN Nestor Pete MD     • melatonin  3 mg Oral HS LAURA Pearson     • menthol-methyl salicylate   Apply externally 4x Daily PRN Piotr Tang MD     • metoprolol succinate  25 mg Oral BID Michaela Dates, CRNP     • morphine  7 5 mg Oral Q6H PRN Deepti Morin MD     • ondansetron  4 mg Oral Q6H PRN Carie Risk, DO     • pantoprazole  40 mg Oral Early Morning Marlene Nesbitt MD     • polyethylene glycol  17 g Oral Daily PRN Carie Risk, DO     • prazosin  1 mg Oral HS Carie Risk, DO     • senna-docusate sodium  1 tablet Oral BID Carie Risk, DO     • sodium chloride  1 spray Each Nare Q1H PRN Cyndie Short MD     • white petrolatum-mineral oil   Topical TID Cyndie Short MD         Subjective/ HPI: Patient seen and examined  Patients overnight issues or events were reviewed with nursing or staff during rounds or morning huddle session  New or overnight issues include the following:     Pt seen in her room  The bed available at TCU would not be ready until this evening  Pt's daughter is not able to transport the pt this evening but will be able to take her tomorrow morning  Pt denies any current complaints  ROS:   A 10 point ROS was performed; negative except as noted above  Imaging:     FL barium swallow video w speech   Final Result by SYSTEMGENERATED, DOCUMENTATION (03/09 1306)      FL barium swallow video w speech   Final Result by  (03/09 1550)      XR hip/pelv 2-3 vws left if performed   Final Result by Ingrid Arriaga MD (03/08 1633)      No acute osseous abnormality  Workstation performed: [id number]         XR shoulder 2+ vw left   Final Result by Ingrid Arriaga MD (03/08 163)      No acute osseous abnormality  Workstation performed: [id number]         XR spine cervical 2 or 3 vw injury   Final Result by Ingrid Arriaga MD (03/08 1643)      No acute osseous abnormality or significant change from priors        Salt-and-pepper appearance of the skull is partially accentuated by the image processing /sharpening algorithm, though prior head CT suggests this is a true finding  Correlate for clinical/laboratory features of hyperparathyroidism  Workstation performed: [id number]         XR chest portable   Final Result by Rosamaria Ramirez MD (03/07 1650)      No acute cardiopulmonary disease  Workstation performed: [id number]         CT head wo contrast   Final Result by Andres Carroll MD (03/07 1358)      Evolving right MCA territory infarcts with associated petechial hemorrhage which is best seen on the recent MRI examination  There is no discrete parenchymal hematoma  Workstation performed: [id number]         XR knee 1 or 2 vw left   Final Result by Rosamaria Ramirez MD (03/07 2127)      No acute osseous abnormality  Workstation performed: [id number]             *Labs /Radiology studies reviewed  *Medications reviewed and reconciled as needed  *Please refer to order section for additional ordered labs studies  *Case discussed with primary attending during morning huddle case rounds    Physical Examination:  Vitals:   Vitals:    03/23/23 1608 03/23/23 1801 03/23/23 2057 03/24/23 0552   BP: 109/69 103/62 93/56 107/65   BP Location: Right arm  Right arm Right arm   Pulse: 95 97 90 88   Resp: 16  16 15   Temp: 97 7 °F (36 5 °C)  (!) 97 1 °F (36 2 °C) 98 °F (36 7 °C)   TempSrc: Axillary  Axillary Axillary   SpO2: 96%  97% 98%   Weight:       Height:           GEN: No apparent distress, pleasant, disappointed about not going to TCU today    NEURO: Alert and oriented x3  HEENT: Pupils are equal and reactive, EOMI, mucous membranes are moist, face symmetrical  CV: S1 S2 regular, no MRG, no peripheral edema noted  RESP: Lungs are clear bilaterally, no wheezes, rales or rhonchi noted, on room air, respirations easy and non labored  GI: Flat, soft non tender, non distended; +BS x4  : Voiding without difficulty  MUSC: Moves all extremities; generalized gait dysfunction  SKIN: skin is pale, poor turgor, scattered ecchymosis on b/l upper extremities  The above physical exam was reviewed and updated as determined by my evaluation of the patient on 3/24/2023  Invasive Devices     None                    VTE Pharmacologic Prophylaxis: Eliquis  Code Status: Level 1 - Full Code  Current Length of Stay: 24 day(s)      Total time spent:  30 minutes  with more than 50% spent counseling/coordinating care  Counseling includes discussion with patient re: progress  and discussion with patient of his/her current medical state/information  Coordination of patient's care was performed in conjunction with primary service  Time invested included review of patient's labs, vitals, and management of their comorbidities with continued monitoring  In addition, this patient was discussed with medical team including physician and advanced extenders  The care of the patient was extensively discussed and appropriate treatment plan was formulated unique for this patient  Medical decision making for the day was made by supervising physician unless otherwise noted in their attestation statement  ** Please Note:  voice to text software may have been used in the creation of this document   Although proof errors in transcription or interpretation are a potential of such software**

## 2023-03-24 NOTE — DISCHARGE INSTR - AVS FIRST PAGE
DISCHARGE INSTRUCTIONS: Gianluca Myers [age]    Bring these instructions with you to your transitional care unit provider so they can order and follow-up any additional lab work or imaging recommended at time of discharge  Resume follow-up with all your prior providers that you have established care prior to this hospitalization  Discuss with primary care physician (PCP) if you have additional questions  It  is you or your caregivers responsibility to obtain follow-up MEDICATION REFILLS  As indicated through your Primary Care Physician (PCP) and other outpatient specialty provider(s) after discharge  Please follow-up with your PCP as soon as possible after discharge to set-up follow-up management and when appropriate refills  You remain a fall and injury risk which could be severe  - Your risk of fall has decreased however since admission to acute rehab  Caregiver training has been completed with our staff  - Appropriate supervision +/- assistance as instructed during your rehab course is recommended to decrease risk of fall and injury  - Continue skilled therapy as discussed after discharge to further decrease this risk    If you (or your health care proxy) have any questions or concerns regarding your acute rehabilitation stay including issues with medications, rehabilitation, and follow-up plan, please call:          [address] [place] [address] in [facility] at [phone] or [phone]  Should you develop fevers, chills, new weakness, changes in sensation, difficulty speaking, facial weakness, confusion, shortness of breath, chest pain, or other concerning symptoms please call 911 and/or obtain transportation to nearest ER immediately  Should you develop feelings of significant hopelessness, severe depression, severe anxiety, or suicide you should call 911 or obtain transportation to nearest ER      24-7 [facility] suicide and crisis "lifeline call \"486\"  [facility] is [phone] and is available 24 hrs/7 days a week  [facility] [phone] is available 24 hrs/7 days for mental health  [facility] ([facility]) AT [place] [phone] is available 24 hrs/7 days for mental health   [address], [facility]ICINE [facility] [place] [address] [phone]    PHYSICIANS to see:  Please see your doctors listed in the follow up providers section of your discharge paperwork, and take the discharge paperwork with you to your appointments  LAB WORK recommended after discharge: Follow-up lab work at discretion of your outpatient physicians to be determined at time of your future appointments  You should periodically have your calcium and electrolytes checked  IMAGING to follow-up:  Follow-up imaging as discussed with your recent physicians or at discretion of your outpatient physicians to be determined at time of your appointments  Due to the following you are at increased risk of skin breakdown/wounds/worsening wounds:  - Impaired mobility  - Impaired nutrition/intake/low albumin  - Medical co-morbidities    Buttocks/Sacrum  Turn as full as possible off sacrum/buttocks every 2 hours  Use Cushion while in chair or wheelchair  Weight shift every 10-15 minutes while in chair  Keep skin clean and dry as possible  Remove wet or soiled clothing/linens promptly  Use barrier cream or similar 2 times per day as needed  Monitor skin for increased breakdown which you are at risk of and notify nursing, PCP, or other physician providers should this occur right away    Heels/bony edges of feet  Float heels off edge of pillow for added pressure relief    Monitor heels and bony protuberances and notify physician or nursing for any increased redness, bogginess, or breakdown    Wound care:  Site Left    Lateral   Site Other (Specify in Comment Field)   Cleanse Flush with Saline   Wound Care Silvasorb gel   Open to air: No       Ensure " appropriate wound care to optimize chances for healing and decrease risks of complications  Your wound(s) remains at risk for worsening and infection     - Should you develop significant pain, swelling, or drainage obtain transportation to nearest emergency room for immediate evaluation if unable to reach your surgeon promptly   - Should you develop uncontrolled pain, fever, chills, sweats, changes in strength, sensation, or color of this area obtain transportation to nearest emergency room for immediate evaluation  WEIGHTBEARING/ACTIVITY PRECAUTIONS to follow:  Weightbearing as tolerated  Driving restrictions: You are recommended against driving until cleared by an outpatient physician and cleared through a formal driving evaluation  Driving at current time can increase your risk of injury and can increase risk of injury of others  As outlined in [address], healthcare personnel are required to report every person over 13years of age diagnosed as having a condition that could impair their ability to drive, with the exception of medical condition expected to last less than 90 days (most commonly orthopedic fractures and post orthopedic surgery conditions without other co-morbidities)  Your medical condition hopefully will have adequately improved by that time but at this time it is not certain  You should NOT operate a motor vehicle while under the influence of an opiate medication, muscle relaxant, or other sedative  Doing so may lead to an accident resulting in serious injury or death to yourself or others  You have agreed to avoid driving when under the influence of this medication  Work restrictions: You should NOT return to work (if working) until cleared by an outpatient physician  You should not operate heavy machinery (if applicable) until cleared by an outpatient physician  Alcohol restrictions:   You are recommended to not drink alcohol at this time unless cleared by an outpatient physician  Drinking alcohol in your current functional condition can increase your risk of injury which could be severe  Drinking alcohol given your current health problems can lead to increased medical complications which could be severe  Combining alcohol with your current medications can increase your risk of injury which could be severe  Smoking restrictions: You are recommended to not smoke nicotine  Smoking increases your risk of heart attack, stroke, emphysema/COPD, and lung cancer  MEDICATIONS:  Please see a full list of your medications outlined in the After Visit Summary that is attached to these Discharge Instructions  Please note changes may have been made to your medications please refer to your discharge paperwork for your current medications and take this list with you to all your doctors appointments for your doctors to review  Please do not resume a home medication unless the medication reconciliation sheet indicates to do so, please do not assume that a medication that you were given a prescription for is the same as a medication you have at home based on both medications having the same name as dosages and frequency may have changed  Unless specifically noted in your medication list provided to you in your discharge paper work do not resume prior vitamins, minerals, or supplements you may have been taking prior to your hospitalization unless instructed by an outpatient physician in the future  Discuss with your primary care at next visit if applicable  Blood thinners prescribed to optimize long and short term health and decrease risk of complications but with risks related to bleeding and other complications  Eliquis (Apixiban) instructions: You have been prescribed apixiban(Eliquis)  This medication is used to prevent clots which can cause severe disability and even death        This medication will need to be managed by your outpatient physician(s) after discharge  Follow-up with the appropriate provider as soon as possible to ensure appropriate use  This is a strong anticoagulant (blood thinner)  It is being recommended for use by you (or your family) to decrease the risk of clotting which can be severely disabling and even life-threatening  Even when provided as recommended it can cause severe disabling and even life-threatening bleeding  Too much or too little of this blood thinner further increases your risk of this medication causing serious and even life-threatening complications such as severe bleeding, clots, strokes, and death  With that said, at this time based on available evidence and consensus agreement, your physicians recommend you take Eliquis (Verdis Perez) medication based on your overall risks and benefits in your specific medical situation  If you (or your family/caregiver) notice black stools, bloody stools, vomit blood, develop new weakness, slurred speech, confusion or have any other concerning symptoms call 911 or obtain transportation to nearest emergency room immediately  Sedating Medications with increased risk of complications: You were previous on a regimen of Long acting Morphine 15mg twice per day however this was changed and decreased based on over sedation and altered mental status  It was fel that your AMS was secondary to polypharmacy as well as a component of hypercalcemia from your hyperparathyroidism  A prescription has been sent for any controlled substances, however your Pain physician will need to continue your medication regimen in the future  I do not anticipate this being an issue considering the overall lower daily opioid regimen you are currently on which you have been tolerating well  There are risks associated with opioid medications, including dependence, addiction and tolerance  The patient understands and agrees to use these medications only as prescribed   Potential side effects of the medications include, but are not limited to, constipation, drowsiness, addiction, impaired judgment and risk of fatal overdose if not taken as prescribed  You should not drive after taking this medication  The patient was warned against driving while taking sedation medications  Sharing medications is a felony  At this point in time, the patient is showing no signs of addiction, abuse, diversion or suicidal ideation  You will be given a limited supply of opiate pain medications on discharge; should you require additional refills/medications, your Pain physician may prescribe at his/her discretion  This can be quite helpful particularly for severe acute pain  There are risks associated with opioid medications  They can increase your risk of falling, injury, and breathing difficulties which can be severe and even life-threatening  Note it is advisable to limit use of opiate pain medications as they can become habit forming and lead to addiction  Other potential side effects of the medication include, but are not limited to, constipation, drowsiness, impaired judgment and risk of fatal overdose if not taken as prescribed  You should not drive while taking this medication  The patient was warned against driving while taking sedation medications  Sharing medications is a felony  At this point in time, the patient is showing no signs of addiction, abuse, diversion or suicidal ideation  The patient (you/or relevant caregiver) understands and agrees to use this medication only as prescribed  Alprazolam (benzodiazepine) sedating medication has been used to help your anxiety and panic  You tolerated this medication adequately during your recent hospital stay  You will be given a limited supply of benzodiazepines (anxiety medication) which is an anxiety medication typically used to control acute short term anxiety or panic attacks       This medication is habit forming and can lead to addiction and dependence  This medication increases your risk of fall with injury which can be severe and even life-threatening  Discuss with your outpatient prescriber possibility of weaning off this medication at their discretion  Of note, you are chronically on both opiates and benzodiazepines  Taking both medications together further increase your risk of fall and even life-threatening injury as well as respiratory depression (slowing and stopping of breathing)  Physicians at times will carefully use these medications in combination but this must be done with close oversight  You have been carefully monitored during your rehab course on these medications without signs of increased confusion, respiratory depression, or worsening balance  Nevertheless, this risk remains  Should you develop confusion, difficulty staying awake, imbalance or other concerning symptoms do NOT take these medications and notify your physician right away or obtain transportation to nearest emergency room  You have been discussed risks and benefits of these medications and at this time have agreed to risks associated with these medications  There are risks associated with benzodiazepine medications, including dependence, addiction and tolerance  The patient understands and agrees to use these medications only as prescribed  Potential side effects of the medications include, but are not limited to, constipation, drowsiness, addiction, impaired judgment and risk of fatal overdose if not taken as prescribed  You should not drive after taking this medication  The patient was warned against driving while taking sedation medications  Sharing medications is a felony  At this point in time, the patient is showing no signs of addiction, abuse, diversion or suicidal ideation      MEDICAL MANAGEMENT AT HOME specific to you:  Hypertension Management:  Only take the medications prescribed for you at time of discharge - overly high or low blood pressure increases your risk for health complications    Follow-up with PCP/family doctor regularly to ensure blood pressure remains adequately controlled  Please check your blood pressure prior to taking your blood pressure medications and keep a log that you will bring with you to your follow-up doctors' appointments  >Please contact your family doctor or cardiologist immediately for a blood pressure below 100/50 and do not take your blood pressure medications until speaking with them  >Please contact your family doctor or cardiologist as soon as possible for blood pressure greater than 160/100  Nighttime (sleeping) low oxygen: You were co-managed by internal medicine in hospital and recommended to follow-up with PCP after discharge  You are recommended to use 2 L of home oxygen when sleeping for now  You were recommended to use the CPAP however declined use due to claustrophobia  Also refusing home CPAP unit- refuses home or hospital CPAP   2L O2 at night with spot checks     Acetaminophen (Tylenol) Dosing Warning: You may have up to 3000 mg of acetaminophen (Tylenol) from all sources spread out over a 24 hours period  Do not have more than that, as this can increase your risk of liver injury which can be serious  Bowel management (constipation risk):  - Ideally you would have 1 well formed BM every 1-2 days  Adjust bowel regimen based on that goal or as close to that as possible  - Start with taking miralax 1 time per day and senna twice daily if you become constipated   - If still constipated you can increase miralax to twice per day and if needed take either oral or by rectum dulcolax (but not at the same time)  - If unable to go for more than 4 days notify your physician for additional recommendations    - If you develop significant abdominal pain, nausea/vomiting, or other concerns seek medical attention right away        Difficulty swallowing :  Continue dysphagia 1 or puréed diet with nectar thick liquids  You have some difficulty swallowing some of which may be related to your stroke however there are some chronic issues with your esophagus  You were seen by gastroenterology in the hospital recommended for outpatient follow-up and intervention potentially  -3/9 Barium Swallow: Esophageal screening was completed  Pt was observed to have slower motility given puree, soft solids noted in upper esophagus  However, at end of assessment, esophageal scan was completed to where it was observed that pt exhibited retropulsion of food stuff at the mid-esophagus level and minimal clearance over time of this food which remained in mid to lower esophagus  SLP had pt use NT wash which did push held up food in mid-lower esophagus through LES  Esophagus does appear to be tortuous as well which can impact clearance of food/liquid  For pain - try to use over the counter topicals (creams/gels) as discussed or acetaminophen 1-2 regular or extra-strength Tylenol up to 2-3 times per day  Could consider heat or ice as well maximum 20 minutes at a time  Do not use heat or ice if using topicals at the same time  Notify primary care and/or orthopedics for poorly controlled pain for additional recommendations  NSAID Warning:  Please avoid NSAID (including but not limited to advil, aleve, motrin, naproxen, ibuprofen, mobic, meloxicam, diclofenac etc) medications as NSAID medications may increase your risk of bleeding (which can be life-threatening), stroke, worsening kidney disease, heart attack, developing/worsening GI ulcers, worsening heart failure, worsening liver (cirrhosis) disease, potentially delay bone healing  Please note a summary of your hospital stay with relevant information for your doctors will try to be sent to them    Please confirm with your doctors at your follow up visits that they have received this summary and have them contact [address] [place] [address] if they have not received them along with any other medical records they may require       Leandro Mckeon Phone Number:  [phone]

## 2023-03-24 NOTE — PROGRESS NOTES
03/24/23 1145   Pain Assessment   Pain Assessment Tool 0-10   Pain Score No Pain   Restrictions/Precautions   Precautions Aspiration;Bed/chair alarms;Cognitive; Fall Risk;Pain;Supervision on toilet/commode;Visual deficit   Eating   Type of Assistance Needed Set-up / clean-up;Supervision;Verbal cues   Physical Assistance Level No physical assistance   Eating CARE Score 4   Swallow Assessment   Swallow Treatment Assessment   Daily Dysphagia Tx Note     Patient Name: Leonel Carrillo    VDBLV'D Date: 3/24/2023     Current Risks for Dysphagia & Aspiration: Weak voicing; weak cough; dysarthria; general debilitation; new neuro event; brain injury; cognitive deficit; hx neurologic dx; positioning issues     Current Symptoms/Concerns: Cough; throat clear; with liquids; during meals; with pills; difficulty chewing; decreased oral intake; hx of dysphagia/aspiration     Current diet: puree/level 1 diet and nectar thick liquids      Premorbid diet::upon d/c from prior admission, pt appeared to be on dysphagia level 2/thin liquids upon prior admission for stroke initially; unknown prior to admission, pt rx being on softer foods as well as thickened liquids in the past        Positioning: upright in recliner         Items administered:Consistencies Administered: nectar thick, puree and mechanical soft solids  Materials administered included: minced roast beef w/ gravy, chopped carrots, mashed potatoes, canned peaches, NT water, magic cup    Total amount of meal consumed:   25% of minced roast beef  50% of chopped carrots  25% of mashed potatoes  3 tsp of canned peaches  75% of magic cup  120cc of NTL by cup      Oral stage:mild-moderate  Lip closure: WFL around tsp, fork, cup  Anterior spillage: none  Mastication: mildly prolonged given roast beef/carrots, mild-moderately prolonged w/ peaches  Bolus formation: minimally decreased  Bolus control: suspect some degree of decreased control given NT cup sips as meal progressed  Transfer: mild-moderately delayed, observing increased transfer time w/ peaches  Oral residue: trace given level 2 items  Pocketing: none         Pharyngeal stage:moderate  Swallow promptness: prompter swallows observed w/ puree/NT and mild-moderate delay w/ level 2 items  Hyolaryngeal elevation: decreased across all consistencies   Wet voice: none  Throat clear: spontaneous x2 near end of meal  Cough: spontaneous x2  Secondary swallows: fairly consistent throughout meal today w/ all PO consumed  Audible swallows: increased given NTL and magic cup       Esophageal stage:No overt sxs were observed given meal         Summary:     Pt presenting with mild-moderate oral and moderate pharyngeal dysphagia today  Symptoms or concerns included decreased mastication, decreased bolus formation, delayed oral intiation, suspected decreased control of NT when taking larger single sips , oral residue with lavel 2 food stuff  and residue effectively cleared with increased time as well as liquid wash delayed pharayngeal swallow, decreased hyolaryngeal excursion, suspect pharyngeal residual, multiple swallows, effortful swallows, audible swallows, inconsistent spontaneous cough/throat clear  It is likely that pt remains to be at risk for silent aspiration as per VFSS  Pt was in recliner for session today to where SLP setup tray for pt  It was observed that pt's ability to complete swallow strategies throughout meal was fairly good, ONLY needing to provide cues for intermittent throat clear/coughs during meal  However, when SLP provided cue for that, pt was able to demonstrate carryover of this as meal progressed  As for swallow function, pt's lip seal around utensils/cup ws functional in addition to bolus retrieval during meal today w/o anterior loss  Mastication given level 2 items was mild-moderately prolonged, more so observed increased mastication w/ peaches, likely due to fatigue as meal progressed   Overall mastication was fairly effective but some decreased formulation was observed leading to trace b/l oral residual  Residual was cleared w/ increased time as well as use of liquid wash  Bolus manipulation of puree foods was mildly prolonged but w/o oral residual  At beginning of meal, bolus control of NT by cup was more timely and likely decreased control occurring as sips progressed and larger sips taken  Swallow initiation did range between mild-moderately delayed, observing more delay as meal progressed suspecting due to fatigue  Hyolaryngeal excursion remains decreased in addition to likely decreased pharyngeal constriction which leads to multiple effortful swallows per boluses  Audible swallows continue to present more given liquids vs solids, but is present consistently  More spontaneous throat clear/coughs were elicited near end of meal  ? Due to more aspiration events vs bottom up aspiration  Recommendations:  Diet:  puree/level 1 diet and nectar thick liquids by CUP ONLY-NO STRAWS  Medications: whole with puree   Aspiration precautions and compensatory swallowing strategies: upright posture, only feed when fully alert, slow rate of feeding, small bites/sips, no straws, effortful swallow, cough every 4-5 bites/sips, quiet environment (tv off, limit talking, door closed, etc ), alternating bites and sips, OOB for ALL meals, MUST be AWAKE/ALERT for meals  Oral care with suction w/ after ALL meals  Once oral care was completed, pt is allowed ICE CHIPS between meals  However, pt MUST be FULLY upright when consuming ice chips  FULL supervision w/ meals     Results reviewed with:  patient     Aspiration precautions posted   Reflux precautions-->Remain upright at least 30 min post meals     F/u ST tx: Pt is recommended for further skilled SLP services targeting dysphagia therapy in order to maximize oral and pharyngeal swallow skills, while safely supporting PO intake, as well as to improve independent carryover of safe swallow strategies  Plan: Continue puree diet and NTL by cup only  Increase trials of dysphagia level 2 items--> due to increased tolerance given level 2 food items in past sessions, consider possible advancement upon transtition to next level of care  Monitor pt's carryover given swallow strategies during meals as she is demonstrating more independence given them during meals  Likely could benefit from repeat VFSS in ~1-2 weeks  Swallow Assessment Prognosis   Prognosis Fair   Prognosis Considerations Co-morbidities;Age;Medical diagnosis; Medical prognosis;Previous level of function;Severity of impairments;New learning ability   SLP Therapy Minutes   SLP Time In 1145   SLP Time Out 1215   SLP Total Time (minutes) 30   SLP Mode of treatment - Individual (minutes) 30   SLP Mode of treatment - Concurrent (minutes) 0   SLP Mode of treatment - Group (minutes) 0   SLP Mode of treatment - Co-treat (minutes) 0   SLP Mode of Treatment - Total time(minutes) 30 minutes   SLP Cumulative Minutes [id number]   Therapy Time missed   Time missed?  No

## 2023-03-24 NOTE — PROGRESS NOTES
PM&R PROGRESS NOTE:  Blanco Soriano 75 y o  female MRN: [id number]  Unit/Bed#: -01 Encounter: [id number]      Rehabilitation Diagnosis: Impairment of mobility, safety, Activities of Daily Living (ADLs), and cognitive/communication skills due to Stroke:  01 1  Left Body Involvement (Right Brain)    HPI: Blanco Soriano is a 75 y o  female with history of asthma, anxiety, bipolar depression, chronic narcotic use, previous enteroviral infection of the heart, seronegative arthropathy of multiple joints, undifferentiated connective tissue disorder, obstructive sleep apnea on home CPAP who presented to the [facility] on 2/22 for altered mental status  According to the patient's daughter on chart review, the patient's mental status had been declining for the prior 48 hours to admission  She did receive Narcan which improved her mental status slightly  Of note she had a recent stroke with revascularization on 2/12/2023 with Dr Mingo Kaufman  She was seen by neurology with overall good strength except mildly weak in the lower extremities with brisk reflexes on the left and a positive Haroon's  At this time is felt to be potentially related to polypharmacy in the setting of chronic pain meds versus seizure in the setting of a recent stroke  She was initiated on 81 mg of aspirin daily with a plan of potentially transitioning to Eliquis, plan for EEG  CT of the head was negative  MRI was completed showing a new small acute/recent interval lacunar infarction in the right cerebral pedun there is jacinta  She was started on Eliquis  Nephrology was consulted as well for hypercalcemia  The patient was evaluated by the Rehabilitation team and deemed an appropriate candidate for comprehensive inpatient rehabilitation and admitted to the [facility] on 2/28/2023  3:41 PM patient was seen in room with brother at bedside and all questions regarding the acute inpatient rehabilitation program were answered  We also discussed at length course in the hospital and her last hospitalization to verify events and clarify any imaging or laboratory findings  Patient is eager to start her therapy program       SUBJECTIVE: Patient seen and evaluated at bedside  She continues to do well and is participating in therapy  Her mental status has been stable over the last 10 days after the adjustments in her medications  We will continue with the current medication list however made some changes to her pain regimen that better match how she is taking her medications at this time  Prescription sent to the [address] [place] [address] Avenue per request by TCU for short acting morphine 7 5 mg twice a day as needed which is how she is generally taking the medication as well as for her alprazolam which is 2 times a day as needed  Discussed plans for discharge tomorrow as her pickup time today would be fairly late at night and she is getting picked up by her daughter for transition over there  She denies any fever, chills, nausea, vomiting, cough, shortness of breath, diarrhea, constipation  She is sleeping well and her mood is stable  Her pain is under fairly good control considering the significant decrease in her overall regimen from previously  It will be important though that she follows up with Dr Jarred Otto her outpatient pain physician in order to obtain further prescriptions for opioid medications however he should be amenable considering that she has been decreased on her overall MME      ASSESSMENT: Stable, progressing    PLAN:    Rehabilitation  • Functional deficits:  Self care and mobility  • Continue current rehabilitation plan of care to maximize function      • Functional update:   Physical Therapy Occupational Therapy Speech Therapy   Weight Bearing Status: Full Weight Bearing  Transfers: Incidental Touching, Minimal Assistance  Bed Mobility: Incidental Touching  Amulation Distance (ft): 100 feet  Ambulation: Incidental Touching  Assistive Device for Ambulation: Roller Walker  Wheelchair Mobility Distance: 100 ft  Wheelchair Mobility: Supervision  Number of Stairs: 6  Assistive Device for Stairs: Bilateral Hand Rails  Stair Assistance: Incidental Touching  Discharge Recommendations: Skilled Nursing Facility   Eating: Supervision  Grooming: Minimal Assistance  Bathing: Moderate Assistance  Bathing: Moderate Assistance  Upper Body Dressing: Moderate Assistance  Lower Body Dressing: Moderate Assistance  Toileting: Moderate Assistance  Tub/Shower Transfer:  Moderate Assistance  Toilet Transfer: Minimal Assistance  Cognition: Exceptions to WNL  Cognition: Decreased Memory, Decreased Executive Functions, Decreased Attention, Decreased Safety  Orientation: Person, Place, Time, Situation   Mode of Communication: Verbal  Speech/Language: Dysarthia  Cognition: Exceptions to WNL  Cognition: Decreased Memory, Decreased Executive Functions, Decreased Attention, Decreased Comprehension, Decreased Safety  Orientation: Person, Place, Time, Situation  Swallowing: Exceptions to WNL  Swallowing: Oral Dysphagia, Pharyngeal Dysphagia, Aspiration Risk  Diet Recommendations: Level 1/Cami Garvey Thick  Discharge Recommendations:  (pending progress)  DC Home with[de-identified] 24 Hour Supervision, 24 Hour Assisteance, Family Support, Home Speech Therapy, Outpatient Speech Therapy       • Estimated Discharge: Transition to subacute rehabilitation    DVT prophylaxis  • On Eliquis     Pain  • Cymbalta 60 mg twice daily  • Gabapentin 100 mg twice daily  • Morphine 7 5 mg 2-3 times a day as needed  • Acetaminophen 650 mg every 6 hours as needed     Bladder plan  • Continent     Bowel plan  • Continent  • Last bowel movement 3/23     Code Status  • Level 1 full code      * Acute CVA (cerebrovascular accident) [facility])  Assessment & Plan  3/8 - neuro exam stable - continue to monitor closely   CT head 3/7 - Evolving right MCA territory infarcts with associated petechial hemorrhage which is best seen on the recent MRI examination  There is no discrete parenchymal hematoma  - discussed above with radiologist Della - no acute bleeding noted   MRI brain 2/22 - 1  New small acute /more recent interval lacunar infarction in the right cerebral peduncle (series 4, image 14)  2   Extensive multifocal large, previously present right middle cerebral artery infarction which is evolving with superimposed areas of evolving hemorrhage likely related to hemorrhagic transformation of subacute right MCA infarction  No significant   mass effect  3   Mild, chronic microangiopathy  S/P thrombectomy 2/12 by Dr Julio Bosch   Currently on a dysphagia 1 diet with thin liquids  Secondary stroke prophylaxis at this time with Eliquis 5 mg twice daily (aspirin d/c'd earlier); and statin; optimal BP control   PT, OT, SLP  Consideration for neuropsychology if patient agreeable  Review modifiable risk factors and provide stroke education  Monitor for poststroke pain  Close neuro follow-up as patient has had multiple strokes in the last 2 months    Hypokalemia  Assessment & Plan  Hypokalemia with potassium down to 3 4 given 40 mill equivalents today on 3/10 but IV infiltrated  Able to get PO dose over weekend  Potasium now WNL  Recheck BMP as of 3/20 up to 3 7 and stable    Wound of left ankle  Assessment & Plan  On lateral ankle possibly occurred at time of fall, reviewed wound getting SilvaSorb examined today on 3/15 with nursing  - Wound care c/s  1  Cleanse L lateral ankle wound with NSS, pat dry, and apply silvasorb gel to the wound bed  Cover with Allevyn foam dressing  Isacc dressing with T  Change every day and as needed for soilage/dislodgement  2  Preventative nursing skin care orders placed  3   Pressure relief, turning and repositioning with foam offloading wedges and waffle cushion to the care  Prevalon boot     Dysphagia  Assessment & Plan  - Current diet: Modified - D1, NTL after failed VBS, GI consulted, plan for esophogram, protonix added  - Recommend VBS study-VBS completed on 3/9 with evidence for esophageal dysmotility and dysphagia recommending puréed with nectar thick, okay for ice chips  - SLP evaluate and treat decline in swallowing   - Aspiration precautions; HOB completely upright when eating, 100% supervision, monitor for pocketing  - Hold oral intake if patient too confused or lethargic and notify MD   - Oral care with meals and bedtime  - Ensure adequate hydration  - Nutrition consult to assist with management   - Rehab nursing and staff to ensure safe and appropriate feeding to decrease risk of aspiration     - Rehab physician oversight dysphagia management and risks associated with it  - Monitor for appropriate fluid and nutrition intake      Gout of foot  Assessment & Plan  Much improved on prednisone  Uric acid wnl   R 1st MTPJ redness and focal tenderness without significant edema or skin breakdown   - IM feels most c/w gout and have ordered prednisone - taper thru 3/12  - Monitor for improvement or non-improvement and other etiologies as well closely     AMS (altered mental status)  Assessment & Plan  Mentation improving as of 3/13 and more consistently after reduction in opioids as well as addition of Sensipar decreasing her overall calcium levels- Continues to be stable as of 3/17  3/7 Patient with some sleepiness at times apparently last several days with poor activity tolerance and now s/p fall - appears back to recent baseline on follow-up eval   CT head 3/7 - Evolving right MCA territory infarcts with associated petechial hemorrhage which is best seen on the recent MRI examination  There is no discrete parenchymal hematoma   - discussed above with radiologist Della - no acute bleeding noted   - Ensure adequate nutrition and hydration with altered diet and sub-optimal intake BUN/Cr acceptable   - Continue Eliquis, monitor vitals scout  - Comgmt with IM team   - Patient afebrile; CBC wnl; LFTs wnl; Ammonia wnl  - Continue Abx of UTI   - Calcium elevated > optimal mgmt per endo > adding Sensipar   - Hx of SHAR - continue 2L at night O2 with spot checks - refusing CPAP - obtain noct ox on 2L   - Hold standing long-acting MS Contin 15mg Q12H and transitioned to MS IR 7 5mg Q6H PRN   - Gabapentin 100mg BID weaned down recently            UTI (urinary tract infection)  Assessment & Plan  UA last week with culture growing greater than 100,000 CFU Morganella and Serratia  Started on Ciprofloxacin but transitioned to Cefpodixime per prior providers with plan to continue into 3/10   Dysuria improving     History of stroke  Assessment & Plan  Patient with recent admission in early 2023 for right MCA CVA with petechial hemorrhage and residual left-sided weakness and dysarthria  Patient had mechanical thrombectomy with Dr Ernestina Bernheim on 2/12    Obstructive sleep apnea  Assessment & Plan  Patient refusing CPAP 2/2 claustrophobia - refuses home or hospital CPAP   2L O2 at night with spot checks   Obtain noct ox on 2L; repeat noct ox prior to d/c for possible home DME     History of bacteremia  Assessment & Plan  Unclear etiology initially of whether her altered mental status was related to stroke versus polypharmacy versus opioid overdose versus infection versus seizure  1/2 micrococcus with a second set staph coag negative and followed by infectious disease after course of antibiotic treatment  Has been off of antibiotics and repeat blood cultures negative to date, ID felt may be contaminant    Discoloration of skin of foot  Assessment & Plan  N/V intact - no significant discoloration today   Per daughter and patient patient's foot will become blue or red and change colors frequently  No evidence for infectious etiology and had a lower extremity arterial duplex with no evidence of any significant arterial occlusive disease  May be related to the seronegative spondyloarthropathies  Likely the source of of the "inaccurate and inconsistent pulse oximetry readings in the hands as well    Hypercalcemia  Assessment & Plan  Nephrology had been following, nonsuppressed PTH with concerns for primary hyperparathyroidism - they defer to endo on mgmt   C spine XR 3/7 - Salt-and-pepper appearance of the skull is partially accentuated by the image processing /sharpening algorithm, though prior head CT suggests this is a true finding  Correlate for clinical/laboratory features of hyperparathyroidism; Salt-and-pepper appearance of the skull accentuated by image processing/sharpening algorithm  However, prior head CT shows a mottled appearance of the skull as well suggesting hyperparathyroidism  No focal lytic or blastic lesions  No acute fractures  Endocrine consult   \"SPEP-no monoclonal bands, UPEP- no monoclonal bands, PTH RP less than 2, 25-hydroxy vitamin D 41 7, 1,25 hydroxy vitamin D 23 7 low, Phos 2 5 low, PTH 65 4   Assessment/Plan:  1  Hypercalcemia: Suspected to be on the basis of primary hyperparathyroidism: Corrected calcium continues to increase slightly  Sensipar 30 mg daily was started and is being tolerated  Given history of osteoporosis, if appropriate surgical candidate consider surgery referral as outpatient  Follow-up with endocrinology as outpatient  Monitor corrected calcium level  Adjust dose as needed  Status post a CT of the chest abdomen and pelvis with no overt malignancy  - Patient has allergy to contrast - discussed with endo fellow  -Calcium as of 3/20 continues to come down with the use of Sensipar    Most recently down to 8 8 from a peak of 12      HTN (hypertension)  Assessment & Plan  Patient with some orthostasis  IM consulted and with overall management at their discretion during ARC course  Monitor orthostatics; PRN ADDY hose, abdominal binder - ensure adequate hydration   Currently on metoprolol succinate 25 mg twice daily with holding parameters   Plan per cardiology did not add an ARB in " "the setting of heart failure however as blood pressure allows    Bipolar depression ([address] )  Assessment & Plan  Currently on Wellbutrin 100 mg twice daily, Lamictal 150 mg nightly, and Cymbalta 60 mg twice daily  Consistent with home regimen  Continue monitoring for stability, mood lability  Consider neuropsychology consultation if patient agreeable    Fall  Assessment & Plan  3/7 early morning - per report \"unwitnessed FALL; Per RN patient set off bed alarm, but has already fallen onto her left side  Confused and wanting to wash her face  Once more awake, mentation improved  \"  - CTH evolving infarct without new bleeding per rads  - L knee mild pain but good ROM without significant edema or instability > improving - XR unremarkable - monitor   - Mild L hip pain but good ROM and can adequately WB > improving - L hip/pelvis XR - mild hip OA without acute findings -  monitor  - Some L shoulder pain - some mildly decreased AROM, +Muñoz > stable -  L shoulder XR - unremarkable - monitor    - Subtle neck pain but full ROM without radicular complaints or focal weakness, sensory changes > improving - C spine XR - no acute findings or change from prior - chronic changes > Chronic hyperkyphosis  Anterolisthesis at C4-5 is unchanged  No acute subluxation; Salt-and-pepper appearance of the skull accentuated by image processing/sharpening algorithm  Multilevel moderate to severe discogenic and uncovertebral degenerative changes greatest at C5-6 and C6-7  Multilevel mild facet degenerative changes greatest at C4-5  > monitor   - Optimal mgmt with wakefulness/AMS - continue 2L at night O2 with spot checks - refusing CPAP  - Has been demonstrating use of call bell and not getting up overnight without assistance  After team discussion, plan for discontinuing observation  Cervical spondylosis  Assessment & Plan  C spine XR 3/7 - Chronic hyperkyphosis  Anterolisthesis at C4-5 is unchanged    No acute subluxation; " Salt-and-pepper appearance of the skull accentuated by image processing/sharpening algorithm  However, prior head CT shows a mottled appearance of the skull as well suggesting hyperparathyroidism  No focal lytic or blastic lesions  No acute fractures  Multilevel moderate to severe discogenic and uncovertebral degenerative changes greatest at C5-6 and C6-7    Multilevel mild facet degenerative changes greatest at C4-5   - No signs of myelopathy or radiculopathy - monitor     Osteoporosis  Assessment & Plan  History of an L1 compression fracture status post kyphoplasty  Was previously on Prolia has not been on since 2020  Outpatient referral to endocrine    CHF (congestive heart failure) ([facility])  Assessment & Plan  Wt Readings from Last 3 Encounters:   03/22/23 62 5 kg (137 lb 12 6 oz)   02/16/23 62 6 kg (138 lb)   02/12/23 67 9 kg (149 lb 11 1 oz)       Cardiomyopathy with reduced ejection fraction as low as 10 to 15% back in 2004 but has improved since and most recently was 25 to 30% on last admission  CXR 3/7 unremarkable; does not appear volume overloaded   Patient was offered LifeVest but declined  On metoprolol succinate 25 mg twice daily, with plan to add an ARB as her blood pressure would allow  Strict I's and O's and weights to be checked every Monday Wednesday Friday  Follows with Dr Sol Gilmore at [facility]      Seronegative arthropathy of multiple sites [facility])  [address] with rheumatology and is on Plaquenil 200 mg Monday through Friday and 400 mg on Saturday Sunday  Continue home regimen and follow-up with rheumatology on discharge    Asthma  Assessment & Plan  3/8 saturating well on RA/2L   3/7 afternoon - patient had questionable desat with therapy but had full improved mentation and was in no resp or other distress at that time  - Comgmt with IM who also evaluated patient at this time    - She historically has had difficulty obtaining O2 from finger tips at times - when pulse ox moved "to toe she had good oxygenation on RA-2L  - CXR no acute findings - bibasilar scarring   - Continue use of albuterol inhaler 2 puffs every 6 hours as needed    Chronic pain syndrome  Assessment & Plan  History of chronic pain secondary to compression fractures in her thoracic spine and chronic rib fractures   Patient with over sleepiness at times at sedating meds decreased some recently   · Holding standing long-acting MS Contin 15mg Q12H and transitioned to MS IR 7 5mg Q6H PRN > pain still adequately controlled > monitor closely   · Gabapentin 100mg BID   · Cymbalta 60 milligrams twice daily  · Acetaminophen 650 mg every 6 hours as needed  · Was previously on oxycodone as needed at [facility] but per family due to concerns for polypharmacy and additional opioids beyond her morphine has not been on an acute care since 2/16  · Lidoderm and Bengay  Aqua K also ordered with instructions not to place over same area  Appreciate IM consultants medical co-management  Labs, medications, and imaging personally reviewed  ROS:  A ten point review of systems was completed on 03/24/23 and pertinent positives are listed in subjective section  All other systems reviewed were negative  OBJECTIVE:   /53   Pulse 83   Temp 98 °F (36 7 °C) (Axillary)   Resp 15   Ht 5' 3\" (1 6 m)   Wt 62 5 kg (137 lb 12 6 oz)   SpO2 98%   BMI 24 41 kg/m²     Physical Exam  Vitals reviewed  Constitutional:       General: She is not in acute distress  Appearance: Normal appearance  HENT:      Head: Atraumatic  Comments: Left-sided facial droop     Right Ear: External ear normal       Left Ear: External ear normal       Nose: Nose normal  No rhinorrhea  Mouth/Throat:      Mouth: Mucous membranes are moist       Pharynx: Oropharynx is clear  Eyes:      General: No scleral icterus  Cardiovascular:      Rate and Rhythm: Normal rate  Pulses: Normal pulses        Heart sounds: Normal heart " sounds  Pulmonary:      Effort: Pulmonary effort is normal  No respiratory distress  Breath sounds: Normal breath sounds  Abdominal:      General: There is no distension  Palpations: Abdomen is soft  Musculoskeletal:      Right lower leg: No edema  Left lower leg: No edema  Skin:     General: Skin is warm and dry  Coloration: Skin is pale  Neurological:      Mental Status: She is oriented to person, place, and time  Sensory: No sensory deficit  Comments: Dysarthria and left-sided weakness    4/5 throughout although getting significantly better compared to initial exams   Psychiatric:         Mood and Affect: Mood normal          Behavior: Behavior normal           Lab Results   Component Value Date    WBC 7 43 03/20/2023    HGB 12 4 03/20/2023    HCT 39 0 03/20/2023    MCV 99 (H) 03/20/2023     03/20/2023     Lab Results   Component Value Date    SODIUM 143 03/20/2023    K 3 7 03/20/2023     (H) 03/20/2023    CO2 28 03/20/2023    BUN 13 03/20/2023    CREATININE 0 82 03/20/2023    GLUC 88 03/20/2023    CALCIUM 8 8 03/20/2023     Lab Results   Component Value Date    INR 0 95 02/22/2023    INR 0 90 02/12/2023    INR 1 12 04/22/2022    PROTIME 12 8 02/22/2023    PROTIME 12 1 02/12/2023    PROTIME 14 0 04/22/2022           Current Facility-Administered Medications:   •  acetaminophen (TYLENOL) tablet 650 mg, 650 mg, Oral, Q6H PRN, Clara Bidding, DO, 650 mg at 03/23/23 1940  •  albuterol (PROVENTIL HFA,VENTOLIN HFA) inhaler 2 puff, 2 puff, Inhalation, Q6H PRN, Clara Bidding, DO, 2 puff at 03/07/23 1358  •  aluminum-magnesium hydroxide-simethicone (MYLANTA) oral suspension 30 mL, 30 mL, Oral, Q4H PRN, Clara Bidding, DO, 30 mL at 03/23/23 2059  •  apixaban (ELIQUIS) tablet 5 mg, 5 mg, Oral, BID, Clara Bidding, DO, 5 mg at 03/24/23 1011  •  atorvastatin (LIPITOR) tablet 40 mg, 40 mg, Oral, QPM, Clara Bidding, DO, 40 mg at 03/23/23 0483  •  bisacodyl (DULCOLAX) rectal suppository 10 mg, 10 mg, Rectal, Once, Stefan Zelaya MD  •  buPROPion [facility]) tablet 100 mg, 100 mg, Oral, BID, Anel Massjoseph, DO, 100 mg at 03/24/23 1014  •  cinacalcet (SENSIPAR) tablet 30 mg, 30 mg, Oral, Daily With Breakfast, Anika Flores MD, 30 mg at 03/24/23 1014  •  DULoxetine (CYMBALTA) delayed release capsule 60 mg, 60 mg, Oral, BID, Anelchester Arrieta, DO, 60 mg at 03/24/23 1010  •  gabapentin (NEURONTIN) capsule 100 mg, 100 mg, Oral, BID, Stefan Zelaya MD, 100 mg at 03/24/23 1010  •  glycerin-hypromellose- (ARTIFICIAL TEARS) ophthalmic solution 1 drop, 1 drop, Both Eyes, Q4H PRN, Anel Arrieta DO, 1 drop at 03/20/23 1832  •  hydroxychloroquine (PLAQUENIL) tablet 200 mg, 200 mg, Oral, Once per day on Mon Tue Wed Thu Fri, Carloz Rivera Handlequintin, DO, 200 mg at 03/24/23 1014  •  hydroxychloroquine (PLAQUENIL) tablet 400 mg, 400 mg, Oral, Once per day on Sun Sat, Carloz Parnell, DO, 400 mg at 03/19/23 9673  •  lamoTRIgine (LaMICtal) tablet 150 mg, 150 mg, Oral, HS, Anel Arrieta, DO, 150 mg at 03/23/23 2100  •  lidocaine (LIDODERM) 5 % patch 1 patch, 1 patch, Topical, Daily, Winnie Genao MD, 1 patch at 03/24/23 1012  •  LORazepam (ATIVAN) tablet 0 25 mg, 0 25 mg, Oral, BID PRN, Stefan Zelaya MD, 0 25 mg at 03/21/23 2215  •  melatonin tablet 3 mg, 3 mg, Oral, HS, LAURA Rutledge, 3 mg at 03/23/23 2100  •  menthol-methyl salicylate (BENGAY) 14-19 % cream, , Apply externally, 4x Daily PRN, Winnie Genao MD, Given at 03/06/23 1227  •  metoprolol succinate (TOPROL-XL) 24 hr tablet 25 mg, 25 mg, Oral, BID, LAURA Conley, 25 mg at 03/24/23 1011  •  morphine (MSIR) IR tablet 7 5 mg, 7 5 mg, Oral, Q6H PRN, Stefan Zelaya MD, 7 5 mg at 03/24/23 1016  •  ondansetron (ZOFRAN-ODT) dispersible tablet 4 mg, 4 mg, Oral, Q6H PRN, Anel Arrieta DO, 4 mg at 03/23/23 1144  •  pantoprazole (PROTONIX) EC tablet 40 mg, 40 mg, Oral, Early Morning, Allison Griffith MD, 40 mg at 03/24/23 0556  •  polyethylene glycol (MIRALAX) packet 17 g, 17 g, Oral, Daily PRN, Sonia Maffucci, DO, 17 g at 03/04/23 1930  •  prazosin (MINIPRESS) capsule 1 mg, 1 mg, Oral, HS, Sonia Maffucci, DO, 1 mg at 03/22/23 2100  •  senna-docusate sodium (SENOKOT S) 8 6-50 mg per tablet 1 tablet, 1 tablet, Oral, BID, Sonia Maffucci, DO, 1 tablet at 03/24/23 1011  •  sodium chloride (OCEAN) 0 65 % nasal spray 1 spray, 1 spray, Each Nare, Q1H PRN, Lui Walsh MD, 1 spray at 03/08/23 0300  •  white petrolatum-mineral oil (EUCERIN,HYDROCERIN) cream, , Topical, TID, Lui Walsh MD, Given at 03/24/23 1023    Past Medical History:   Diagnosis Date   • Anxiety    • Asthma    • Bipolar depression ([place] [address] )    • Chronic narcotic dependence (HCC)    • Chronic pain    • Depression    • Enterovirus heart infection    • SHAR (obstructive sleep apnea)    • Osteoarthritis    • Peripheral neuropathy    • Plantar fasciitis of right foot    • Senile osteoporosis    • Seronegative arthropathy of multiple sites [facility])    • Undifferentiated connective tissue disease ([place] [address] )        Patient Active Problem List    Diagnosis Date Noted   • Acute CVA (cerebrovascular accident) ([place] [address] ) 02/23/2023   • Hypokalemia 03/10/2023   • AMS (altered mental status) 03/09/2023   • Gout of foot 03/09/2023   • Dysphagia 03/09/2023   • Wound of left ankle 03/09/2023   • UTI (urinary tract infection) 03/06/2023   • Obstructive sleep apnea 02/28/2023   • History of stroke 02/28/2023   • History of bacteremia 02/24/2023   • Hypercalcemia 02/22/2023   • Discoloration of skin of foot 02/22/2023   • Abdominal pain 02/22/2023   • HFrEF (heart failure with reduced ejection fraction) ([facility] ) 02/13/2023   • Stroke ([place] [address] ) 02/12/2023   • HTN (hypertension) 02/12/2023   • Cardiomyopathy ([facility] ) 02/07/2023   • Vitamin D insufficiency 02/07/2023   • LAILA (acute kidney injury) ([place] [address] ) 04/23/2022   • Polypharmacy 04/23/2022   • 'light-for-dates' infant with signs of fetal malnutrition 04/22/2022   • Bipolar depression ([facility] ) 03/04/2022   • Rheumatoid arthritis ([place] [address] ) 03/04/2022   • Closed fracture of multiple ribs of right side 03/02/2022   • Traumatic pneumothorax 03/02/2022   • Closed fracture of transverse process of lumbar vertebra ([facility] ) 03/02/2022   • Liver contusion 03/02/2022   • Fall 03/02/2022   • Acute pain due to trauma 03/02/2022   • Right shoulder pain 03/02/2022   • Severe protein-calorie malnutrition ([place] [address] ) 03/02/2022   • Undifferentiated connective tissue disease ([place] [address] ) 12/29/2021   • Primary generalized (osteo)arthritis 12/29/2021   • Peripheral neuropathy 12/29/2021   • Osteoporosis 12/29/2021   • Hx of compression fracture of spine 12/29/2021   • Lumbar spondylosis 12/29/2021   • Cervical spondylosis 12/29/2021   • Anemia 06/26/2021   • Chronic pain syndrome    • Asthma    • Ambulatory dysfunction    • Seronegative arthropathy of multiple sites ([place] [address] )    • Gastroesophageal reflux disease without esophagitis    • Mixed stress and urge urinary incontinence    • CHF (congestive heart failure) ([place] [address] )         Mima Dale DO  Physical Medicine and Priyanka Rice    I have spent a total time of 35 minutes on 03/24/23 in caring for this patient including Instructions for management, Patient and family education, Counseling / Coordination of care, Documenting in the medical record, Reviewing / ordering tests, medicine, procedures  , Communicating with other healthcare professionals  and Discharge planning including sending prescriptions for controlled substances, discussing medications with patient and planning for discharge to TCU  Additionally time spent with education for ongoing medical care and need for follow-up with her outpatient pain physician

## 2023-03-24 NOTE — PROGRESS NOTES
"   03/24/23 0800   Pain Assessment   Pain Assessment Tool 0-10   Pain Score 6   Pain Location/Orientation Location: Back;Orientation: Mid;Orientation: Lower   Restrictions/Precautions   Precautions Aspiration;Bed/chair alarms;Cognitive; Fall Risk;Pain;Supervision on toilet/commode;Visual deficit  (L-inattention)   Weight Bearing Restrictions No   ROM Restrictions No   Lifestyle   Autonomy \"I am getting nervous about leaving, I will miss you guys  \"   Oral Hygiene   Type of Assistance Needed Physical assistance;Set-up / Iris Corona; Verbal cues   Physical Assistance Level 25% or less   Comment while seated in w/c, A w/ suction  Oral Hygiene CARE Score 3   Shower/Bathe Self   Type of Assistance Needed Physical assistance;Verbal cues; Adaptive equipment;Set-up / clean-up   Physical Assistance Level 25% or less   Comment bathed BUE and BLE w/ dynamic fxnl reach to feet while seated  Min A in stance to bathe santiago and buttocks  vc's to maximize L-attention and natural use of LUE during bathing, req inc time  Shower/Bathe Self CARE Score 3   Tub/Shower Transfer   Adaptive Equipment Grab Bars;Seat with Back   Assessed Shower   Findings Min A stand pivot w/ use of grab bars, Min A for proper hand placement   Upper Body Dressing   Type of Assistance Needed Physical assistance   Physical Assistance Level 25% or less   Comment A to orient shirt, completed while seated w/ pt able to carryover hernandez dressing w/ inc time  cues to fully pull down back  Upper Body Dressing CARE Score 3   Lower Body Dressing   Type of Assistance Needed Physical assistance;Verbal cues; [place] / Iris Corona; Adaptive equipment   Physical Assistance Level 25% or less   Comment able to thread BLE while seated in w/c w/ dynamic fxnl reach to feet w/ extended time  Min A to steady in stance at [facility] for clothing management w/ inc time and A to fully manage over L hip     Lower Body Dressing CARE Score 3   Putting On/Taking Off Footwear   Type of Assistance Needed " Physical assistance   Physical Assistance Level 25% or less   Comment seated in w/c w/ dynamic fxnl reach to feet to doff socks  able to doff L sock, req A to thread sock over R toes w/ ability to fully manage over heel  Putting On/Taking Off Footwear CARE Score 3   Sit to Stand   Type of Assistance Needed Incidental touching   Physical Assistance Level No physical assistance   Comment CGA, vc's for proper hand placement to ensure L grasp on RW  Pt does best w/ L hand to RW and R hand pushing from arm rest    Sit to Stand CARE Score 4   Bed-Chair Transfer   Type of Assistance Needed Physical assistance;Verbal cues; Adaptive equipment   Physical Assistance Level 25% or less   Comment Min A w/ RW for short distance fxnl mobility  Chair/Bed-to-Chair Transfer CARE Score 3   Toileting Hygiene   Type of Assistance Needed Physical assistance   Physical Assistance Level 25% or less   Comment pt managed santiago hygiene while seate  Min A to steady in stance for clothing management w/ inc time, A to fully manage over L hip  Toileting Hygiene CARE Score 3   Toilet Transfer   Type of Assistance Needed Physical assistance; Adaptive equipment;Verbal cues; Set-up / clean-up   Physical Assistance Level 25% or less   Comment stand pivot w/ RW to [facility] over toilet  Toilet Transfer CARE Score 3   Cognition   Overall Cognitive Status Impaired   Comments cont w/ impaired safety awareness, dec STM, impaired insight/judgement, dec cognitive flexibility, and impaired problem-solving  When paired w/ L-inattention and L hemiparesis, pt remains high fall risk  Assessment   Treatment Assessment Pt seen for skilled OT session focusing on self-care management  Details on ADL noted above, cont to req Min A when in stance w/ or without RW and cues to maximize L-attention and use of LUE during fxnl tasks   Add'lly limited by cognitive deficits, req vc's to carryover L visual scanning strategies to negotiate environmental barriers and to properly sequence to inc safety; without cues and physical A pt remains high fall risk  Pt will cont to benefit from sub-acute rehab w/ focus on LUE NMR, L visual scanning, RW management, endurance work, repetitive safety training, and ADL retraining  Pt was left resting in recliner w/ all needs within reach and alarm activated  Plan to d/c to [facility]) tomorrow morning  OT Family training done with: 3   Prognosis Fair   Problem List Decreased strength;Decreased endurance;Decreased range of motion; Impaired balance;Decreased mobility; Decreased coordination;Decreased cognition; Impaired judgement;Decreased safety awareness   Plan   Treatment/Interventions ADL retraining;Functional transfer training; Therapeutic exercise; Endurance training;Patient/family training;Cognitive reorientation   Recommendation   OT Discharge Recommendation Post acute rehabilitation services  (TCU)   OT Therapy Minutes   OT Time In 0800   OT Time Out 0925   OT Total Time (minutes) 85   OT Mode of treatment - Individual (minutes) 85   OT Mode of treatment - Concurrent (minutes) 0   OT Mode of treatment - Group (minutes) 0   OT Mode of treatment - Co-treat (minutes) 0   OT Mode of Treatment - Total time(minutes) 85 minutes   OT Cumulative Minutes 2005   Therapy Time missed   Time missed?  No

## 2023-03-24 NOTE — PROGRESS NOTES
"   03/24/23 1235   Pain Assessment   Pain Assessment Tool 0-10   Pain Score 6   Pain Location/Orientation Orientation: Lower; Location: Back   Pain Onset/Description Onset: Ongoing;Frequency: Constant/Continuous  (chronic)   Hospital Pain Intervention(s) Rest   Restrictions/Precautions   Precautions Aspiration;Bed/chair alarms;Cognitive; Fall Risk;Supervision on toilet/commode;Visual deficit; Pain  (L inattention)   Cognition   Overall Cognitive Status [facility]   Arousal/Participation Alert; Cooperative   Attention Attends with cues to redirect   Memory Within functional limits   Following Commands Follows one step commands with increased time or repetition   Subjective   Subjective \"My back always hurts\"  Pt agreeable and ready for therapy  Roll Left and Right   Type of Assistance Needed Supervision   Comment HOB flat no bed rails   Roll Left and Right CARE Score 4   Sit to Lying   Type of Assistance Needed Supervision   Comment HOB flat no bed rails   Sit to Lying CARE Score 4   Lying to Sitting on Side of Bed   Type of Assistance Needed Supervision   Comment HOB flat no bed rails   Lying to Sitting on Side of Bed CARE Score 4   Sit to Stand   Type of Assistance Needed Incidental touching; Adaptive equipment   Comment CG; RW; min VC for reminders on hand placement   Sit to Stand CARE Score 4   Bed-Chair Transfer   Type of Assistance Needed Incidental touching; Adaptive equipment   Comment CG; recliner SPT to w/c with RW; min VC for hand placement   Chair/Bed-to-Chair Transfer CARE Score 4   Car Transfer   Type of Assistance Needed Incidental touching; Adaptive equipment   Comment CG with RW; min VC for hand placement   Car Transfer CARE Score 4   Walk 10 Feet   Type of Assistance Needed Incidental touching; Adaptive equipment   Comment CGA with/without RW; 2x10 ft, 4x20 ft; pt requires VC to attend to L side while walking   Walk 10 Feet CARE Score 4   Walk 50 Feet with Two Turns   Type of Assistance Needed Incidental " touching; Adaptive equipment   Comment CGA with RW; 1x50 ft; pt requires VC to attend to L side while walking to avoid obstacles in way   Walk 50 Feet with Two Turns CARE Score 4   Walk 150 Feet   Reason if not Attempted Safety concerns   Walk 150 Feet CARE Score 88   Walking 10 Feet on Uneven Surfaces   Type of Assistance Needed Physical assistance   Physical Assistance Level 25% or less   Comment Min A with RW over foam mat; required min VC for sequencing   Walking 10 Feet on Uneven Surfaces CARE Score 3   Ambulation   Does the patient walk? 2   Yes   Wheel 50 Feet with Two Turns   Type of Assistance Needed Supervision;Verbal cues   Comment bilat LE and R UE only for propulsion; max VC to attend to L side to avoid obstacles; pt consistently hit objects placed on L side as she was easily distractible and had inattention   Wheel 50 Feet with Two Turns CARE Score 4   Wheel 150 Feet   Type of Assistance Needed Supervision;Verbal cues   Comment bilat LE and R UE only for propulsion; max VC to attend to L side to avoid obstacles   Wheel 150 Feet CARE Score 4   Curb or Single Stair   Style negotiated Curb   Type of Assistance Needed Physical assistance   Physical Assistance Level 25% or less   Comment Min A with RW; Min VC for sequencing; up with left, down with left as per pt preference   1 Step (Curb) CARE Score 3   4 Steps   Type of Assistance Needed Physical assistance   Physical Assistance Level 25% or less   Comment Min A due to LE weakness; 6 in stair ; up with left, down with left as per pt preference; reciprocal pattern ascending, non-reciproal descending bwds; min VC for foot placement   4 Steps CARE Score 3   12 Steps   Type of Assistance Needed Physical assistance   Physical Assistance Level 25% or less   Comment Min A due to LE weakness; 6in stair training with L rail; reciprocal pattern ascending, non-reciproal descending bwds; min VC for foot placement   12 Steps CARE Score 3   Picking Up Object Type of Assistance Needed Incidental touching   Comment CGA with reacher to pick pen off floor   Picking Up Object CARE Score 4   Therapeutic Interventions   Strengthening Seated: marches 1x20 alternating, LAQ 1x10 alternating with 5 sec hold, 2x10 heel to toes   Flexibility bilateral hamstring and gastroc stretch 20 sec x 5   Neuromuscular Re-Education cone weaving with w/c, using L UE to utilize hairbrush, walking around chair on L side while carrying different sized objects up to 1 lb in L hand to work on L inattention and L hand , standing balance while tapping with LUE different colored paper as per color sequence given, standing foot taps with L and R LE following color sequence, standing ball kicks with RW support to fatigue   Assessment   Treatment Assessment Pt tolerated 115 min of skilled PT focusing on therapeutic activity, therapeutic exercise, and NMR  Pt continues to have difficulty with L inattention with demonstrating decreased environmental scanning  Pt consistently cued to continue to look at obstacles on her L as she passes them to be sure she does not hit them  Pt showed improvement on standing tolerance ranging from 1 min 27 sec-2 min 58 sec with distraction of NMR activities  Pt demonstrates improved endurance with tolerating longer PT session with no further c/o  Pt will be discharged tomorrow to TCU  Pt left supine in bed with HOB 30 degrees, bed alarm activated, and needs within reach  Problem List Decreased strength;Decreased endurance; Impaired balance;Decreased mobility; Decreased coordination;Decreased safety awareness;Pain   Barriers to Discharge None   PT Barriers   Physical Impairment Decreased strength;Decreased endurance; Impaired balance;Decreased mobility; Decreased coordination;Decreased safety awareness;Pain   Plan   Treatment/Interventions Functional transfer training;LE strengthening/ROM; Therapeutic exercise;Cognitive reorientation; Endurance training;Patient/family training;Equipment eval/education; Bed mobility;Gait training; Compensatory technique education   Progress Progressing toward goals   Recommendation   PT Discharge Recommendation Post acute rehabilitation services  (TCU)   PT Therapy Minutes   PT Time In 1235   PT Time Out 1430   PT Total Time (minutes) 115   PT Mode of treatment - Individual (minutes) 115   PT Mode of treatment - Concurrent (minutes) 0   PT Mode of treatment - Group (minutes) 0   PT Mode of treatment - Co-treat (minutes) 0   PT Mode of Treatment - Total time(minutes) 115 minutes   PT Cumulative Minutes 3505   Therapy Time missed   Time missed?  No

## 2023-03-24 NOTE — PLAN OF CARE
Problem: SAFETY ADULT  Goal: Patient will remain free of falls  Description: INTERVENTIONS:  - Educate patient/family on patient safety including physical limitations  - Instruct patient to call for assistance with activity   - Consult OT/PT to assist with strengthening/mobility   - Keep Call bell within reach  - Keep bed low and locked with side rails adjusted as appropriate  - Keep care items and personal belongings within reach  - Initiate and maintain comfort rounds  - Make Fall Risk Sign visible to staff  - Offer Toileting every 2 Hours, in advance of need  - Initiate/Maintain alarm  - Obtain necessary fall risk management equipment:  - Apply yellow socks and bracelet for high fall risk patients  - Consider moving patient to room near nurses station  Outcome: Progressing

## 2023-03-24 NOTE — CASE MANAGEMENT
Case Management Update:  Jeannine from [facility] reporting pt would now need to get to [address] after 7pm due to another pt dcing from their facility   Cm spoke with dtr Asher Hodgkins who reports Saturday would work best, Asher Hodgkins to transport pt to [address] around [facility] aware of Saturday dc as well as [facility] team     Report: [phone]  Fax: [phone]

## 2023-03-24 NOTE — PLAN OF CARE
Problem: PAIN - ADULT  Goal: Verbalizes/displays adequate comfort level or baseline comfort level  Description: Interventions:  - Encourage patient to monitor pain and request assistance  - Assess pain using appropriate pain scale  - Administer analgesics based on type and severity of pain and evaluate response  - Implement non-pharmacological measures as appropriate and evaluate response  - Consider cultural and social influences on pain and pain management  - Notify physician/advanced practitioner if interventions unsuccessful or patient reports new pain  3/24/2023 1259 by Gus Everett RN  Outcome: Progressing  3/24/2023 1259 by Gus Everett RN  Outcome: Progressing     Problem: INFECTION - ADULT  Goal: Absence or prevention of progression during hospitalization  Description: INTERVENTIONS:  - Assess and monitor for signs and symptoms of infection  - Monitor lab/diagnostic results  - Monitor all insertion sites, i e  indwelling lines, tubes, and drains  - Monitor endotracheal if appropriate and nasal secretions for changes in amount and color  - [place] appropriate cooling/warming therapies per order  - Administer medications as ordered  - Instruct and encourage patient and family to use good hand hygiene technique  - Identify and instruct in appropriate isolation precautions for identified infection/condition  Outcome: Progressing  Goal: Absence of fever/infection during neutropenic period  Description: INTERVENTIONS:  - Monitor WBC    Outcome: Progressing     Problem: SAFETY ADULT  Goal: Patient will remain free of falls  Description: INTERVENTIONS:  - Educate patient/family on patient safety including physical limitations  - Instruct patient to call for assistance with activity   - Consult OT/PT to assist with strengthening/mobility   - Keep Call bell within reach  - Keep bed low and locked with side rails adjusted as appropriate  - Keep care items and personal belongings within reach  - Initiate and maintain comfort rounds  - Make Fall Risk Sign visible to staff  - Offer ToiletinHours, in advance of need  - Initiate/arm  - Obtain necessary fall risk   - Apply yellow socks and bracelet for high fall risk patients  - Consider moving patient to room near nurses station  Outcome: Progressing  Goal: Maintain or return to baseline ADL function  Description: INTERVENTIONS:  -  Assess patient's ability to carry out ADLs; assess patient's baseline for ADL function and identify physical deficits which impact ability to perform ADLs (bathing, care of mouth/teeth, toileting, grooming, dressing, etc )  - Assess/evaluate cause of self-care deficits   - Assess range of motion  - Assess patient's mobility; develop plan if impaired  - Assess patient's need for assistive devices and provide as appropriate  - Encourage maximum independence but intervene and supervise when necessary  - Involve family in performance of ADLs  - Assess for home care needs following discharge   - Consider OT consult to assist with ADL evaluation and planning for discharge  - Provide patient education as appropriate  Outcome: Progressing  Goal: Maintains/Returns to pre admission functional level  Description: INTERVENTIONS:  - Perform BMAT or MOVE assessment daily    - Set and communicate daily mobility goal to care team and patient/family/caregiver  - Collaborate with rehabilitation services on mobility goals if consulted  - Perform Range oes a day  - Reposition patienurs    - Dangle patient  - Stand patien  - Ambulate p  - Out of bed for  - Out of bed for toileting  - Record patient progress and toleration of activity level   Outcome: Progressing     Problem: DISCHARGE PLANNING  Goal: Discharge to home or other facility with appropriate resources  Description: INTERVENTIONS:  - Identify barriers to discharge w/patient and caregiver  - Arrange for needed discharge resources and transportation as appropriate  - Identify discharge learning needs (meds, wound care, etc )  - Arrange for interpretive services to assist at discharge as needed  - Refer to Case Management Department for coordinating discharge planning if the patient needs post-hospital services based on physician/advanced practitioner order or complex needs related to functional status, cognitive ability, or social support system  Outcome: Progressing     Problem: Nutrition/Hydration-ADULT  Goal: Nutrient/Hydration intake appropriate for improving, restoring or maintaining nutritional needs  Description: Monitor and assess patient's nutrition/hydration status for malnutrition  Collaborate with interdisciplinary team and initiate plan and interventions as ordered  Monitor patient's weight and dietary intake as ordered or per policy  Utilize nutrition screening tool and intervene as necessary  Determine patient's food preferences and provide high-protein, high-caloric foods as appropriate       INTERVENTIONS:  - Monitor oral intake, urinary output, labs, and treatment plans  - Assess nutrition and hydration status and recommend course of action  - Evaluate amount of meals eaten  - Assist patient with eating if necessary   - Allow adequate time for meals  - Recommend/ encourage appropriate diets, oral nutritional supplements, and vitamin/mineral supplements  - Order, calculate, and assess calorie counts as needed  - Recommend, monitor, and adjust tube feedings and TPN/PPN based on assessed needs  - Assess need for intravenous fluids  - Provide specific nutrition/hydration education as appropriate  - Include patient/family/caregiver in decisions related to nutrition  Outcome: Progressing     Problem: Prexisting or High Potential for Compromised Skin Integrity  Goal: Skin integrity is maintained or improved  Description: INTERVENTIONS:  - Identify patients at risk for skin breakdown  - Assess and monitor skin integrity  - Assess and monitor nutrition and hydration status  - Monitor labs   - Assess for incontinence   - Turn and reposition patient  - Assist with mobility/ambulation  - Relieve pressure over bony prominences  - Avoid friction and shearing  - Provide appropriate hygiene as needed including keeping skin clean and dry  - Evaluate need for skin moisturizer/barrier cream  - Collaborate with interdisciplinary team   - Patient/family teaching  - Consider wound care consult   Outcome: Progressing     Problem: MOBILITY - ADULT  Goal: Maintain or return to baseline ADL function  Description: INTERVENTIONS:  -  Assess patient's ability to carry out ADLs; assess patient's baseline for ADL function and identify physical deficits which impact ability to perform ADLs (bathing, care of mouth/teeth, toileting, grooming, dressing, etc )  - Assess/evaluate cause of self-care deficits   - Assess range of motion  - Assess patient's mobility; develop plan if impaired  - Assess patient's need for assistive devices and provide as appropriate  - Encourage maximum independence but intervene and supervise when necessary  - Involve family in performance of ADLs  - Assess for home care needs following discharge   - Consider OT consult to assist with ADL evaluation and planning for discharge  - Provide patient education as appropriate  Outcome: Progressing  Goal: Maintains/Returns to pre admission functional level  Description: INTERVENTIONS:  - Perform BMAT or MOVE assessment daily    - Set and communicate daily mobility goal to care team and patient/family/caregiver  - Collaborate with rehabilitation services on mobility goals if consulted  - Perform imes a day  - Reposition patienours    - Dangle patiy  - Stand   - Amb  - Out of bed to ch  - Out of bed for iesha  - Out of bed for toileting  - Record patient progress and toleration of activity level   Outcome: Progressing

## 2023-03-25 VITALS
HEIGHT: 63 IN | WEIGHT: 137.79 LBS | TEMPERATURE: 97.5 F | HEART RATE: 82 BPM | BODY MASS INDEX: 24.41 KG/M2 | OXYGEN SATURATION: 98 % | SYSTOLIC BLOOD PRESSURE: 95 MMHG | DIASTOLIC BLOOD PRESSURE: 53 MMHG | RESPIRATION RATE: 20 BRPM

## 2023-03-25 RX ADMIN — ONDANSETRON 4 MG: 4 TABLET, ORALLY DISINTEGRATING ORAL at 09:56

## 2023-03-25 RX ADMIN — HYDROXYCHLOROQUINE SULFATE 400 MG: 200 TABLET ORAL at 07:51

## 2023-03-25 RX ADMIN — SENNOSIDES AND DOCUSATE SODIUM 1 TABLET: 8.6; 5 TABLET ORAL at 07:49

## 2023-03-25 RX ADMIN — LIDOCAINE 5% 1 PATCH: 700 PATCH TOPICAL at 07:49

## 2023-03-25 RX ADMIN — CINACALCET 30 MG: 30 TABLET, FILM COATED ORAL at 07:51

## 2023-03-25 RX ADMIN — MORPHINE SULFATE 7.5 MG: 15 TABLET ORAL at 09:56

## 2023-03-25 RX ADMIN — DULOXETINE HYDROCHLORIDE 60 MG: 60 CAPSULE, DELAYED RELEASE ORAL at 07:49

## 2023-03-25 RX ADMIN — PANTOPRAZOLE SODIUM 40 MG: 40 TABLET, DELAYED RELEASE ORAL at 06:18

## 2023-03-25 RX ADMIN — BUPROPION HYDROCHLORIDE TABLETS 100 MG: 100 TABLET, FILM COATED ORAL at 07:50

## 2023-03-25 RX ADMIN — GABAPENTIN 100 MG: 100 CAPSULE ORAL at 07:49

## 2023-03-25 RX ADMIN — Medication 1 APPLICATION.: at 07:52

## 2023-03-25 RX ADMIN — APIXABAN 5 MG: 5 TABLET, FILM COATED ORAL at 07:49

## 2023-03-25 RX ADMIN — METOPROLOL SUCCINATE 25 MG: 25 TABLET, EXTENDED RELEASE ORAL at 07:49

## 2023-03-25 NOTE — PLAN OF CARE
Problem: PAIN - ADULT  Goal: Verbalizes/displays adequate comfort level or baseline comfort level  Description: Interventions:  - Encourage patient to monitor pain and request assistance  - Assess pain using appropriate pain scale  - Administer analgesics based on type and severity of pain and evaluate response  - Implement non-pharmacological measures as appropriate and evaluate response  - Consider cultural and social influences on pain and pain management  - Notify physician/advanced practitioner if interventions unsuccessful or patient reports new pain  Outcome: Completed     Problem: INFECTION - ADULT  Goal: Absence or prevention of progression during hospitalization  Description: INTERVENTIONS:  - Assess and monitor for signs and symptoms of infection  - Monitor lab/diagnostic results  - Monitor all insertion sites, i e  indwelling lines, tubes, and drains  - Monitor endotracheal if appropriate and nasal secretions for changes in amount and color  - [place] appropriate cooling/warming therapies per order  - Administer medications as ordered  - Instruct and encourage patient and family to use good hand hygiene technique  - Identify and instruct in appropriate isolation precautions for identified infection/condition  Outcome: Completed  Goal: Absence of fever/infection during neutropenic period  Description: INTERVENTIONS:  - Monitor WBC    Outcome: Completed     Problem: SAFETY ADULT  Goal: Patient will remain free of falls  Description: INTERVENTIONS:  - Educate patient/family on patient safety including physical limitations  - Instruct patient to call for assistance with activity   - Consult OT/PT to assist with strengthening/mobility   - Keep Call bell within reach  - Keep bed low and locked with side rails adjusted as appropriate  - Keep care items and personal belongings within reach  - Initiate and maintain comfort rounds  - Make Fall Risk Sign visible to staff  - Offer Toileting every Hours, in advance of need  - Initiate/Maintain alarm  - Obtain necessary fall risk management equipment:   - Apply yellow socks and bracelet for high fall risk patients  - Consider moving patient to room near nurses station  Outcome: Completed  Goal: Maintain or return to baseline ADL function  Description: INTERVENTIONS:  -  Assess patient's ability to carry out ADLs; assess patient's baseline for ADL function and identify physical deficits which impact ability to perform ADLs (bathing, care of mouth/teeth, toileting, grooming, dressing, etc )  - Assess/evaluate cause of self-care deficits   - Assess range of motion  - Assess patient's mobility; develop plan if impaired  - Assess patient's need for assistive devices and provide as appropriate  - Encourage maximum independence but intervene and supervise when necessary  - Involve family in performance of ADLs  - Assess for home care needs following discharge   - Consider OT consult to assist with ADL evaluation and planning for discharge  - Provide patient education as appropriate  Outcome: Completed  Goal: Maintains/Returns to pre admission functional level  Description: INTERVENTIONS:  - Perform BMAT or MOVE assessment daily    - Set and communicate daily mobility goal to care team and patient/family/caregiver  - Collaborate with rehabilitation services on mobility goals if consulted  - Perform Range of Motion  times a day  - Reposition patient every  hours    - Dangle patient  times a day  - Stand patient times a day  - Ambulate patient times a day  - Out of bed to chair  times a day   - Out of bed for meals  times a day  - Out of bed for toileting  - Record patient progress and toleration of activity level   Outcome: Completed     Problem: DISCHARGE PLANNING  Goal: Discharge to home or other facility with appropriate resources  Description: INTERVENTIONS:  - Identify barriers to discharge w/patient and caregiver  - Arrange for needed discharge resources and transportation as appropriate  - Identify discharge learning needs (meds, wound care, etc )  - Arrange for interpretive services to assist at discharge as needed  - Refer to Case Management Department for coordinating discharge planning if the patient needs post-hospital services based on physician/advanced practitioner order or complex needs related to functional status, cognitive ability, or social support system  Outcome: Completed     Problem: Nutrition/Hydration-ADULT  Goal: Nutrient/Hydration intake appropriate for improving, restoring or maintaining nutritional needs  Description: Monitor and assess patient's nutrition/hydration status for malnutrition  Collaborate with interdisciplinary team and initiate plan and interventions as ordered  Monitor patient's weight and dietary intake as ordered or per policy  Utilize nutrition screening tool and intervene as necessary  Determine patient's food preferences and provide high-protein, high-caloric foods as appropriate       INTERVENTIONS:  - Monitor oral intake, urinary output, labs, and treatment plans  - Assess nutrition and hydration status and recommend course of action  - Evaluate amount of meals eaten  - Assist patient with eating if necessary   - Allow adequate time for meals  - Recommend/ encourage appropriate diets, oral nutritional supplements, and vitamin/mineral supplements  - Order, calculate, and assess calorie counts as needed  - Recommend, monitor, and adjust tube feedings and TPN/PPN based on assessed needs  - Assess need for intravenous fluids  - Provide specific nutrition/hydration education as appropriate  - Include patient/family/caregiver in decisions related to nutrition  Outcome: Completed     Problem: Prexisting or High Potential for Compromised Skin Integrity  Goal: Skin integrity is maintained or improved  Description: INTERVENTIONS:  - Identify patients at risk for skin breakdown  - Assess and monitor skin integrity  - Assess and monitor nutrition and hydration status  - Monitor labs   - Assess for incontinence   - Turn and reposition patient  - Assist with mobility/ambulation  - Relieve pressure over bony prominences  - Avoid friction and shearing  - Provide appropriate hygiene as needed including keeping skin clean and dry  - Evaluate need for skin moisturizer/barrier cream  - Collaborate with interdisciplinary team   - Patient/family teaching  - Consider wound care consult   Outcome: Completed     Problem: MOBILITY - ADULT  Goal: Maintain or return to baseline ADL function  Description: INTERVENTIONS:  -  Assess patient's ability to carry out ADLs; assess patient's baseline for ADL function and identify physical deficits which impact ability to perform ADLs (bathing, care of mouth/teeth, toileting, grooming, dressing, etc )  - Assess/evaluate cause of self-care deficits   - Assess range of motion  - Assess patient's mobility; develop plan if impaired  - Assess patient's need for assistive devices and provide as appropriate  - Encourage maximum independence but intervene and supervise when necessary  - Involve family in performance of ADLs  - Assess for home care needs following discharge   - Consider OT consult to assist with ADL evaluation and planning for discharge  - Provide patient education as appropriate  Outcome: Completed  Goal: Maintains/Returns to pre admission functional level  Description: INTERVENTIONS:  - Perform BMAT or MOVE assessment daily    - Set and communicate daily mobility goal to care team and patient/family/caregiver  - Collaborate with rehabilitation services on mobility goals if consulted  - Perform Range of Motion  times a day  - Reposition patient every  hours    - Dangle patient  times a day  - Stand patient  times a day  - Ambulate patient times a day  - Out of bed to chair  times a day   - Out of bed for meals times a day  - Out of bed for toileting  - Record patient progress and toleration of activity level   Outcome: Completed

## 2023-03-25 NOTE — DISCHARGE SUMMARY
Discharge Summary - PMR   Prince Lockhart 75 y o  female MRN: [id number]  Unit/Bed#: -01 Encounter: [id number]    Admission Date: 2/28/2023     Discharge Date: 3/25/2023    Etiologic/Rehabilitation Diagnosis: Impairment of mobility, safety and Activities of Daily Living (ADLs) due to Stroke:  01 1  Left Body Involvement (Right Brain)    HPI: Yasir Weems a 75 y  o  female with history of asthma, anxiety, bipolar depression, chronic narcotic use, previous enteroviral infection of the heart, seronegative arthropathy of multiple joints, undifferentiated connective tissue disorder, obstructive sleep apnea on home CPAP who presented to the [address] [place] [address] 2/22 for altered mental status   According to the patient's daughter on chart review, the patient's mental status had been declining for the prior 48 hours to admission   She did receive Narcan which improved her mental status slightly   Of note she had a recent stroke with revascularization on 2/12/2023 with Dr Maggie Villarreal  Yash Gold was seen by neurology with overall good strength except mildly weak in the lower extremities with brisk reflexes on the left and a positive Haroon's   At this time is felt to be potentially related to polypharmacy in the setting of chronic pain meds versus seizure in the setting of a recent stroke  Yash Gold was initiated on 81 mg of aspirin daily with a plan of potentially transitioning to Eliquis, plan for EEG   CT of the head was negative   MRI was completed showing a new small acute/recent interval lacunar infarction in the right cerebral pedun there is jacinta   She was started on Eliquis  Nephrology was consulted as well for hypercalcemia  The patient was evaluated by the Rehabilitation team and deemed an appropriate candidate for comprehensive inpatient rehabilitation and admitted to the [facility] on 2/28/2023  3:41 PM patient was seen in room with brother at bedside and all questions regarding the acute inpatient rehabilitation program were answered   We also discussed at length course in the hospital and her last hospitalization to verify events and clarify any imaging or laboratory findings  Rickey Mloina is eager to start her therapy program       Procedures Performed During ARC Admission: None     Acute Rehabilitation Center Course: Patient progressed in therapies and from medical standpoint however he needed continued rehabilitation prior to discharge home with family  Please see the below for functional progress as well as medical details    * Acute CVA (cerebrovascular accident) [facility])  Assessment & Plan  3/8 - neuro exam stable - continue to monitor closely   CT head 3/7 - Evolving right MCA territory infarcts with associated petechial hemorrhage which is best seen on the recent MRI examination  There is no discrete parenchymal hematoma  - discussed above with radiologist Della - no acute bleeding noted   MRI brain 2/22 - 1  New small acute /more recent interval lacunar infarction in the right cerebral peduncle (series 4, image 14)  2   Extensive multifocal large, previously present right middle cerebral artery infarction which is evolving with superimposed areas of evolving hemorrhage likely related to hemorrhagic transformation of subacute right MCA infarction  No significant   mass effect  3   Mild, chronic microangiopathy  S/P thrombectomy 2/12 by Dr Romina Baumann   Currently on a dysphagia 1 diet with thin liquids  Secondary stroke prophylaxis at this time with Eliquis 5 mg twice daily (aspirin d/c'd earlier); and statin; optimal BP control   PT, OT, SLP     Review modifiable risk factors and provide stroke education  Monitor for poststroke pain  Eliquis for secondary stroke prophylaxis  Close neuro follow-up as patient has had multiple strokes in the last 2 months    Hypokalemia  Assessment & Plan  Hypokalemia with potassium down to 3 4 given 40 mill equivalents today on 3/10 but IV infiltrated  Able to get PO dose over weekend  Potasium now WNL  Recheck BMP as of 3/20 up to 3 7 and stable    Wound of left ankle  Assessment & Plan  On lateral ankle possibly occurred at time of fall, reviewed wound getting SilvaSorb examined today on 3/15 with nursing  - Wound care c/s  1  Cleanse L lateral ankle wound with NSS, pat dry, and apply silvasorb gel to the wound bed  Cover with Allevyn foam dressing  Isacc dressing with T  Change every day and as needed for soilage/dislodgement  2  Preventative nursing skin care orders placed  3   Pressure relief, turning and repositioning with foam offloading wedges and waffle cushion to the care  Prevalon boot     Dysphagia  Assessment & Plan  - Current diet: Modified - D1, NTL after failed VBS, GI consulted, plan for esophogram, protonix added  - Recommend VBS study-VBS completed on 3/9 with evidence for esophageal dysmotility and dysphagia recommending puréed with nectar thick, okay for ice chips  - SLP evaluate and treat decline in swallowing   - Aspiration precautions; HOB completely upright when eating, 100% supervision, monitor for pocketing  - Hold oral intake if patient too confused or lethargic and notify MD   - Oral care with meals and bedtime  - Ensure adequate hydration  - Nutrition consult to assist with management   - Rehab nursing and staff to ensure safe and appropriate feeding to decrease risk of aspiration     - Rehab physician oversight dysphagia management and risks associated with it  - Monitor for appropriate fluid and nutrition intake      Gout of foot  Assessment & Plan  Much improved on prednisone  Uric acid wnl   R 1st MTPJ redness and focal tenderness without significant edema or skin breakdown   - IM feels most c/w gout and have ordered prednisone - taper thru 3/12  - Monitor for improvement or non-improvement and other etiologies as well closely     AMS (altered mental status)  Assessment & Plan  Mentation improving as of 3/13 and more consistently after reduction in opioids as well as addition of Sensipar decreasing her overall calcium levels- Continues to be stable as of 3/17  3/7 Patient with some sleepiness at times apparently last several days with poor activity tolerance and now s/p fall - appears back to recent baseline on follow-up eval   CT head 3/7 - Evolving right MCA territory infarcts with associated petechial hemorrhage which is best seen on the recent MRI examination  There is no discrete parenchymal hematoma   - discussed above with radiologist Della - no acute bleeding noted   - Ensure adequate nutrition and hydration with altered diet and sub-optimal intake BUN/Cr acceptable   - Continue Eliquis, monitor vitals closley  - Comgmt with IM team   - Patient afebrile; CBC wnl; LFTs wnl; Ammonia wnl  - Continue Abx of UTI   - Calcium elevated > optimal mgmt per endo > adding Sensipar   - Hx of SHAR - continue 2L at night O2 with spot checks - refusing CPAP - obtain noct ox on 2L   - Hold standing long-acting MS Contin 15mg Q12H and transitioned to MS IR 7 5mg Q6H PRN   - Gabapentin 100mg BID weaned down recently            UTI (urinary tract infection)  Assessment & Plan  UA last week with culture growing greater than 100,000 CFU Morganella and Serratia  Started on Ciprofloxacin but transitioned to Cefpodixime per prior providers with plan to continue into 3/10   Dysuria resolved    History of stroke  Assessment & Plan  Patient with recent admission in early 2023 for right MCA CVA with petechial hemorrhage and residual left-sided weakness and dysarthria  Patient had mechanical thrombectomy with Dr Zenobia Quan on 2/12    Obstructive sleep apnea  Assessment & Plan  Patient refusing CPAP 2/2 claustrophobia - refuses home or hospital CPAP   2L O2 at night with spot checks   Obtain noct ox on 2L; repeat noct ox prior to d/c for possible home DME     History of bacteremia  Assessment & Plan  Unclear etiology initially of whether her altered mental status was related to stroke versus "polypharmacy versus opioid overdose versus infection versus seizure  1/2 micrococcus with a second set staph coag negative and followed by infectious disease after course of antibiotic treatment  Has been off of antibiotics and repeat blood cultures negative to date, ID felt may be contaminant    Discoloration of skin of foot  Assessment & Plan  N/V intact - no significant discoloration today   Per daughter and patient patient's foot will become blue or red and change colors frequently  No evidence for infectious etiology and had a lower extremity arterial duplex with no evidence of any significant arterial occlusive disease  May be related to the seronegative spondyloarthropathies  Likely the source of of the inaccurate and inconsistent pulse oximetry readings in the hands as well  Continue local wound care with SilvaSorb for the left lateral foot and Eucerin cream for the remaining dry cracked skin throughout the feet and hands which has been a chronic issue    Hypercalcemia  Assessment & Plan  Nephrology had been following, nonsuppressed PTH with concerns for primary hyperparathyroidism - they defer to endo on mgmt   C spine XR 3/7 - Salt-and-pepper appearance of the skull is partially accentuated by the image processing /sharpening algorithm, though prior head CT suggests this is a true finding  Correlate for clinical/laboratory features of hyperparathyroidism; Salt-and-pepper appearance of the skull accentuated by image processing/sharpening algorithm  However, prior head CT shows a mottled appearance of the skull as well suggesting hyperparathyroidism  No focal lytic or blastic lesions  No acute fractures  Endocrine consult   \"SPEP-no monoclonal bands, UPEP- no monoclonal bands, PTH RP less than 2, 25-hydroxy vitamin D 41 7, 1,25 hydroxy vitamin D 23 7 low, Phos 2 5 low, PTH 65 4   Assessment/Plan:  1    Hypercalcemia: Suspected to be on the basis of primary hyperparathyroidism: Corrected calcium continues " "to increase slightly  Sensipar 30 mg daily was started and is being tolerated  Given history of osteoporosis, if appropriate surgical candidate consider surgery referral as outpatient  Follow-up with endocrinology as outpatient  Monitor corrected calcium level  Adjust dose as needed  Status post a CT of the chest abdomen and pelvis with no overt malignancy  - Patient has allergy to contrast - discussed with endo fellow  -Calcium as of 3/20 continues to come down with the use of Sensipar  Most recently down to 8 8 from a peak of 12      HTN (hypertension)  Assessment & Plan  Patient with some orthostasis  IM consulted and with overall management at their discretion during ARC course  Monitor orthostatics; PRN ADDY hose, abdominal binder - ensure adequate hydration   Currently on metoprolol succinate 25 mg twice daily with holding parameters   Plan per cardiology did not add an ARB in the setting of heart failure however as blood pressure allows    Bipolar depression ([facility] [address] )  Assessment & Plan  Currently on Wellbutrin 100 mg twice daily, Lamictal 150 mg nightly, and Cymbalta 60 mg twice daily  Consistent with home regimen  Continue monitoring for stability, mood lability  Consider neuropsychology consultation if patient agreeable    Fall  Assessment & Plan  3/7 early morning - per report \"unwitnessed FALL; Per RN patient set off bed alarm, but has already fallen onto her left side  Confused and wanting to wash her face  Once more awake, mentation improved  \"  - CTH evolving infarct without new bleeding per rads  - L knee mild pain but good ROM without significant edema or instability > improving - XR unremarkable - monitor   - Mild L hip pain but good ROM and can adequately WB > improving - L hip/pelvis XR - mild hip OA without acute findings -  monitor  - Some L shoulder pain - some mildly decreased AROM, +Muñoz > stable -  L shoulder XR - unremarkable - monitor    - Subtle neck pain but full ROM without " radicular complaints or focal weakness, sensory changes > improving - C spine XR - no acute findings or change from prior - chronic changes > Chronic hyperkyphosis  Anterolisthesis at C4-5 is unchanged  No acute subluxation; Salt-and-pepper appearance of the skull accentuated by image processing/sharpening algorithm  Multilevel moderate to severe discogenic and uncovertebral degenerative changes greatest at C5-6 and C6-7  Multilevel mild facet degenerative changes greatest at C4-5  > monitor   - Optimal mgmt with wakefulness/AMS - continue 2L at night O2 with spot checks - refusing CPAP  - Has been demonstrating use of call bell and not getting up overnight without assistance  After team discussion, plan for discontinuing observation  Cervical spondylosis  Assessment & Plan  C spine XR 3/7 - Chronic hyperkyphosis  Anterolisthesis at C4-5 is unchanged  No acute subluxation; Salt-and-pepper appearance of the skull accentuated by image processing/sharpening algorithm  However, prior head CT shows a mottled appearance of the skull as well suggesting hyperparathyroidism  No focal lytic or blastic lesions  No acute fractures  Multilevel moderate to severe discogenic and uncovertebral degenerative changes greatest at C5-6 and C6-7    Multilevel mild facet degenerative changes greatest at C4-5   - No signs of myelopathy or radiculopathy - monitor     Osteoporosis  Assessment & Plan  History of an L1 compression fracture status post kyphoplasty  Was previously on Prolia has not been on since 2020  Outpatient referral to endocrine    CHF (congestive heart failure) ([facility] [address] )  Assessment & Plan  Wt Readings from Last 3 Encounters:   03/22/23 62 5 kg (137 lb 12 6 oz)   02/16/23 62 6 kg (138 lb)   02/12/23 67 9 kg (149 lb 11 1 oz)       Cardiomyopathy with reduced ejection fraction as low as 10 to 15% back in 2004 but has improved since and most recently was 25 to 30% on last admission  CXR 3/7 unremarkable; does not appear volume overloaded   Patient was offered LifeVest but declined  On metoprolol succinate 25 mg twice daily, with plan to add an ARB as her blood pressure would allow  Strict I's and O's and weights to be checked every Monday Wednesday Friday  Follows with [facility] Royal [facility] at [facility]      Seronegative arthropathy of multiple sites [facility])  [address] with rheumatology and is on Plaquenil 200 mg Monday through Friday and 400 mg on Saturday Sunday  Continue home regimen and follow-up with rheumatology on discharge    Asthma  Assessment & Plan  3/8 saturating well on RA/2L   3/7 afternoon - patient had questionable desat with therapy but had full improved mentation and was in no resp or other distress at that time  - Comgmt with IM who also evaluated patient at this time    - She historically has had difficulty obtaining O2 from finger tips at times - when pulse ox moved to toe she had good oxygenation on RA-2L  - CXR no acute findings - bibasilar scarring   - Continue use of albuterol inhaler 2 puffs every 6 hours as needed    Chronic pain syndrome  Assessment & Plan  History of chronic pain secondary to compression fractures in her thoracic spine and chronic rib fractures   Patient with over sleepiness at times at sedating meds decreased some recently   · Holding standing long-acting MS Contin 15mg Q12H and transitioned to MS IR 7 5mg Q6H PRN > pain still adequately controlled > monitor closely   · Gabapentin 100mg BID   · Cymbalta 60 milligrams twice daily  · Acetaminophen 650 mg every 6 hours as needed  · [facility] but per family due to concerns for polypharmacy and additional opioids beyond her morphine has not been on an acute care since 2/16  · Lidoderm and Bengay  Aqua K also ordered with instructions not to place over same area     · At this time she is utilizing her as needed morphine 7 5 mg 1-3 times a day on average 1-2 times a day on most days  I plan on discharging her on morphine IR 7 5 mg twice a day as needed as this is how she is accurately taking the medication  This is significantly helped with her overall cognition and mental status and a prescription had been sent as she is being transition to [facility]  She follows with Dr Loren Martinez as her outpatient pain physician who generally writes for her opioids  He will have to continue writing for these pain medications on more of a long-term basis as she would benefit from having a single provider for her overall pain regimen  Discharge Physical Examination:    Vitals:    03/24/23 1352 03/24/23 1750 03/24/23 2104 03/25/23 0433   BP: 102/61 104/59 92/52 95/53   BP Location: Right arm  Right arm Right arm   Pulse: 84 97 86 82   Resp: 17  20 20   Temp: 97 5 °F (36 4 °C)  97 7 °F (36 5 °C) 97 5 °F (36 4 °C)   TempSrc: Oral  Axillary Axillary   SpO2: 98%  98%    Weight:       Height:           Physical Exam  Vitals reviewed  Constitutional:       General: She is not in acute distress  Appearance: Normal appearance  HENT:      Head: Atraumatic  Comments: Left-sided facial droop     Right Ear: External ear normal       Left Ear: External ear normal       Nose: Nose normal  No rhinorrhea  Mouth/Throat:      Mouth: Mucous membranes are moist       Pharynx: Oropharynx is clear  Eyes:      General: No scleral icterus  Cardiovascular:      Rate and Rhythm: Normal rate  Pulses: Normal pulses  Heart sounds: Normal heart sounds  Pulmonary:      Effort: Pulmonary effort is normal  No respiratory distress  Breath sounds: Normal breath sounds  Abdominal:      General: There is no distension  Palpations: Abdomen is soft  Musculoskeletal:      Right lower leg: No edema  Left lower leg: No edema  Skin:     General: Skin is warm and dry  Coloration: Skin is pale        Comments: Dry cracked skin on the feet and hands, variable cool to touch versus warm to touch, however good distal pulses in the dorsalis pedis no evidence of first MTP swelling or redness  Left lateral ankle wound covered   Neurological:      Mental Status: She is oriented to person, place, and time  Sensory: No sensory deficit  Comments: Dysarthria and mild left hemiparesis 4/5 out of 5 affecting more of the distal upper extremities   Psychiatric:         Mood and Affect: Mood normal          Behavior: Behavior normal            Significant Findings, Care, Treatment and Services Provided: Acute comprehensive interdisciplinary inpatient rehabilitation including PT, OT, SLP, RN, CM    Complications: AMS that was addressed and resolved with reduction of sedating medications in addition to addressing her hypercalcemia with Sensipar    Functional Status Upon Admission to [facility]:  Physical Therapy: Bed mobility moderate assist, transfers moderate assist ambulation moderate assist using rolling walker for 10 feet +30 feet +20 feet x 2  Occupational Therapy: Upper body ADLs min assist, lower body ADLs moderate assist, grooming supervision  Speech Therapy: Dysphagia 1 and thins    Functional Status Upon Discharge from [facility]:   Physical Therapy Occupational Therapy Speech Therapy   Weight Bearing Status: Full Weight Bearing  Transfers: Incidental Touching, Minimal Assistance  Bed Mobility: Incidental Touching  Amulation Distance (ft): 100 feet  Ambulation: Incidental Touching  Assistive Device for Ambulation: Roller Walker  Wheelchair Mobility Distance: 100 ft  Wheelchair Mobility: Supervision  Number of Stairs: 6  Assistive Device for Stairs: Bilateral Hand Rails  Stair Assistance: Incidental Touching  Discharge Recommendations: Skilled Nursing Facility   Eating: Supervision  Grooming: Minimal Assistance  Bathing: Moderate Assistance  Bathing: Moderate Assistance  Upper Body Dressing: Moderate Assistance  Lower Body Dressing: Moderate Assistance  Toileting:  Moderate Assistance  Tub/Shower Transfer: Moderate Assistance  Toilet Transfer: Minimal Assistance  Cognition: Exceptions to WNL  Cognition: Decreased Memory, Decreased Executive Functions, Decreased Attention, Decreased Safety  Orientation: Person, Place, Time, Situation   Mode of Communication: Verbal  Speech/Language: Dysarthia  Cognition: Exceptions to WNL  Cognition: Decreased Memory, Decreased Executive Functions, Decreased Attention, Decreased Comprehension, Decreased Safety  Orientation: Person, Place, Time, Situation  Swallowing: Exceptions to WNL  Swallowing: Oral Dysphagia, Pharyngeal Dysphagia, Aspiration Risk  Diet Recommendations: Level 1/Puree, Rand Thick  Discharge Recommendations:  (pending progress)  DC Home with[de-identified] 24 Hour Supervision, 24 Hour Assisteance, Family Support, Home Speech Therapy, Outpatient Speech Therapy         Discharge Diagnosis: Impairment of mobility, safety and Activities of Daily Living (ADLs) due to Stroke:  01 1  Left Body Involvement (Right Brain)    Discharge Medications:   See after visit summary for reconciled discharge medications provided to patient and family  Condition at Discharge: good     Discharge instructions/Information to patient and family:   See after visit summary for information provided to patient and family  Provisions for Follow-Up Care:  See after visit summary for information related to follow-up care and any pertinent home health orders  Future Appointments   Date Time Provider Niki Cotton   3/29/2023  2:45 PM Irma Tejeda PA-C [facility] [place]   5/15/2023 10:00 AM [facility]       Disposition[facility]    Planned Readmission: No    Discharge Medications:  See after visit summary for reconciled discharge medications provided to patient and family        Facility Administered Medications Prior to Discharge:    Current Facility-Administered Medications   Medication Dose Route Frequency Provider Last Rate   • acetaminophen  650 mg Oral Q6H PRN Zada Doing, DO     • albuterol  2 puff Inhalation Q6H PRN Zada Doing, DO     • aluminum-magnesium hydroxide-simethicone  30 mL Oral Q4H PRN Zada Doing, DO     • apixaban  5 mg Oral BID Zada Doing, DO     • atorvastatin  40 mg Oral QPM Zada Doing, DO     • bisacodyl  10 mg Rectal Once Francisco Javier Pardo MD     • buPROPion  100 mg Oral BID Zada Doing, DO     • cinacalcet  30 mg Oral Daily With Breakfast Anika Mehta MD     • DULoxetine  60 mg Oral BID Zada Doing, DO     • gabapentin  100 mg Oral BID Francisco Javier Pardo MD     • glycerin-hypromellose-  1 drop Both Eyes Q4H PRN Zada Doing, DO     • hydroxychloroquine  200 mg Oral Once per day on Mon Tue Wed Thu Fri Zada Doing, DO     • hydroxychloroquine  400 mg Oral Once per day on Sun Sat Zada Doing, DO     • lamoTRIgine  150 mg Oral HS Zada Doing, DO     • lidocaine  1 patch Topical Daily Danial Orellana MD     • LORazepam  0 25 mg Oral BID PRN Francisco Javier Pardo MD     • melatonin  3 mg Oral HS LAURA Adams     • menthol-methyl salicylate   Apply externally 4x Daily PRN Danial Orellana MD     • metoprolol succinate  25 mg Oral BID Jani Kanner, CRNP     • morphine  7 5 mg Oral Q6H PRN Francisco Javier Pardo MD     • ondansetron  4 mg Oral Q6H PRN Zada Doing, DO     • pantoprazole  40 mg Oral Early Morning Jory Velasquez MD     • polyethylene glycol  17 g Oral Daily PRN Zada Doing, DO     • prazosin  1 mg Oral HS Zada Doing, DO     • senna-docusate sodium  1 tablet Oral BID Zada Doing, DO     • sodium chloride  1 spray Each Nare Q1H PRN Jeromy Sharma MD     • white petrolatum-mineral oil   Topical TID MD Felicitas Reno DO  [facility]    I have spent a total time of 55 minutes on 03/25/23 in caring for this patient including Diagnostic results, Instructions for management, Patient and family education, Risk factor reductions, Counseling / Coordination of care, Documenting in the medical record, Communicating with other healthcare professionals  and Communicating with the transitional care facility regarding medication plan and prescription sent for controlled substances to pharmacy previously per request

## 2023-03-27 ENCOUNTER — TELEPHONE (OUTPATIENT)
Dept: ENDOCRINOLOGY | Facility: CLINIC | Age: 68
End: 2023-03-27

## 2023-03-27 ENCOUNTER — NURSING HOME VISIT (OUTPATIENT)
Dept: GERIATRICS | Facility: OTHER | Age: 68
End: 2023-03-27

## 2023-03-27 VITALS
HEART RATE: 76 BPM | OXYGEN SATURATION: 95 % | BODY MASS INDEX: 23.81 KG/M2 | RESPIRATION RATE: 16 BRPM | SYSTOLIC BLOOD PRESSURE: 99 MMHG | DIASTOLIC BLOOD PRESSURE: 55 MMHG | WEIGHT: 134.4 LBS | TEMPERATURE: 97.8 F

## 2023-03-27 DIAGNOSIS — R41.0 DELIRIUM: ICD-10-CM
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
## 2023-03-27 DIAGNOSIS — F31.9 BIPOLAR DEPRESSION (HCC): ICD-10-CM

## 2023-03-27 DIAGNOSIS — I50.9 CHRONIC CONGESTIVE HEART FAILURE, UNSPECIFIED HEART FAILURE TYPE (HCC): ICD-10-CM

## 2023-03-27 DIAGNOSIS — E83.52 HYPERCALCEMIA: ICD-10-CM

## 2023-03-27 DIAGNOSIS — I63.9 ACUTE CVA (CEREBROVASCULAR ACCIDENT) (HCC): Primary | ICD-10-CM

## 2023-03-27 DIAGNOSIS — R10.84 GENERALIZED ABDOMINAL PAIN: ICD-10-CM

## 2023-03-27 DIAGNOSIS — E87.6 HYPOKALEMIA: ICD-10-CM

## 2023-03-27 DIAGNOSIS — N39.0 URINARY TRACT INFECTION WITHOUT HEMATURIA, SITE UNSPECIFIED: ICD-10-CM

## 2023-03-27 NOTE — ASSESSMENT & PLAN NOTE
Followed by Nephrology and Endocrinology  Hypercalcemia suspected to be caused by primary hyperthyroidism  Currently on Sensipar 30 mg QD which is being tolerated well  Calcium continues to decrease, as of 03/27 it is 8 4 (highest peak 12)  Continue monitoring  Continue following up with Nephrology and Endocrinology

## 2023-03-27 NOTE — ASSESSMENT & PLAN NOTE
03/27 - Patient's mental status continues to improve  Notes confusion in the morning when awakening, takes approx  5 minutes to orient herself to where she is  Is alert and oriented x 3  Continue monitoring closely

## 2023-03-27 NOTE — ASSESSMENT & PLAN NOTE
"Last fall on 03/07 - unwitnessed fall onto her left side which set bed alarm off (based on prior documentation)  03/27 - Has not had any falls since 03/07  Continues to have trouble walking due to \"left foot dragging\" but is able to ambulate using walker with assistance  Continue fall precautions  Continue use of walker with assistance  Continue working with PT and OT     "

## 2023-03-27 NOTE — PROGRESS NOTES
"[facility]    History and Physical  POS: 31    Records Reviewed include: Hospital records      Chief Complaint/ Reason for Admission: Stroke     History of Present Illness: Amaris Dawson is a 69-year-old female with a history of a recent right MCA infarct, asthma, bipolar disorder, anxiety, seronegative rheumatoid arthritis, hypercalcemia, hypokalemia, gout of the right toe, obstructive sleep apnea, chronic narcotic use, and a urinary tract infection  She initially presented to [facility] on 02/22 for altered mental status that was first noticed by the patient's daughter  MRI imaging was completed at the time and showed a new small lacunar infarct in the right cerebral peduncle  Neurology was consulted and she was started on Eliquis at this time  After being evaluated by the rehabilitation team, the patient was admitted to the [facility] on 02/28  She remained in the rehabilitation facility until 03/25/23 which is when she was discharged and admitted to the transitional care facility  Since arriving at the [address] on 03/25, she states that she has \"felt more confused\" and that her anxiety has been increased  She explains that she wakes up in the morning very confused and that it takes her a few minutes to orient herself  Otherwise, the patient believes she has made significant progress over the last month in the rehabilitation unit and would like to continue making progress and get back to \"normal\"  She is able to explain her baseline ADLs, stating that before being admitted she was able to complete all of her daily tasks for herself at home but that she had her brother and son living with her to help if she needed anything  She has significant distress regarding not being able to go home as she states she is \"the leader of the house\"   She brings up concern regarding her past UTI that she was diagnosed with on 03/03 and states that in the last week she has felt \"pressure\" over her bladder again and " that last week her urine had a very foul-smell  She denies any foul-smelling urine this week but states the pressure has remained  She also admits to being constipated since being admitted to the facility two days ago  She states that she has not had a bowel movement since arriving and that she is experiencing some abdominal discomfort due to the constipation                  Allergies    Allergies   Allergen Reactions   • Dye [Iodinated Contrast Media] Anaphylaxis     IVP dye   • Bactrim [Sulfamethoxazole-Trimethoprim] Hives   • Erythromycin Hives   • Penicillins        Past Medical History  Past Medical History:   Diagnosis Date   • Anxiety    • Asthma    • Bipolar depression ([facility] )    • Chronic narcotic dependence ([place] [address] )    • Chronic pain    • Depression    • Enterovirus heart infection    • SHAR (obstructive sleep apnea)    • Osteoarthritis    • Peripheral neuropathy    • Plantar fasciitis of right foot    • Senile osteoporosis    • Seronegative arthropathy of multiple sites ([facility] )    • Undifferentiated connective tissue disease (HCC)         Past Surgical History:   Procedure Laterality Date   • BREAST IMPLANT     • FIXATION KYPHOPLASTY LUMBAR SPINE     • HYSTERECTOMY     • INCONTINENCE SURGERY N/A    • IR STROKE ALERT  2/12/2023   • NASAL SEPTOPLASTY W/ TURBINOPLASTY N/A    • RECTAL PROLAPSE REPAIR N/A    • REPAIR RECTOCELE     • RHINOPLASTY N/A        Family History  Family History   Problem Relation Age of Onset   • No Known Problems Mother    • No Known Problems Father    • Arthritis Family    • Diabetes Family    • Stroke Family        Social History  Social History     Tobacco Use   Smoking Status Former   Smokeless Tobacco Never      Social History     Substance and Sexual Activity   Alcohol Use Not Currently    Comment: quit      Social History     Substance and Sexual Activity   Drug Use Yes   • Types: Marijuana    Comment: has medical card        Lives: Home, with other family member:brother and son   Social Support: Brother, son, and daughter   Rylie Thomas in the past 12 months: Last fall on 03/07, has not had another fall since   Use of assistance Device: Cane and Walker    Physical Exam    Vital Signs    Vitals:    03/27/23 1408   BP: 99/55   Pulse: 76   Resp: 16   Temp: 97 8 °F (36 6 °C)   SpO2: 95%       Constitutional: Frail appearing patient in no obvious sign of distress  Appears well-developed, well-nourished, and is appropriately dressed  Physical Exam  Vitals and nursing note reviewed  Constitutional:       General: She is not in acute distress  Appearance: She is not diaphoretic  HENT:      Head: Normocephalic and atraumatic  Comments: Left sided facial droop  Saliva present under left side of mouth  Mouth/Throat:      Mouth: Mucous membranes are moist    Eyes:      General:         Right eye: No discharge  Left eye: No discharge  Pupils: Pupils are equal, round, and reactive to light  Cardiovascular:      Rate and Rhythm: Normal rate and regular rhythm  Heart sounds: Normal heart sounds  No murmur heard  Pulmonary:      Effort: Pulmonary effort is normal  No respiratory distress  Breath sounds: Normal breath sounds  No wheezing  Abdominal:      General: There is no distension  Palpations: Abdomen is soft  Tenderness: There is abdominal tenderness (Generalized )  There is no guarding or rebound  Musculoskeletal:         General: No tenderness  Cervical back: Normal range of motion and neck supple  Right lower leg: No edema  Left lower leg: No edema  Skin:     General: Skin is warm and dry  Coloration: Skin is pale  Findings: Bruising present  Neurological:      Mental Status: She is alert and oriented to person, place, and time  Mental status is at baseline  Motor: Weakness (left UE and LE) present     Psychiatric:      Comments: anxious         Review of Systems:  Review of Systems   Constitutional: Negative for chills, diaphoresis, fatigue and fever  HENT: Negative for congestion, hearing loss, rhinorrhea and sore throat  Respiratory: Negative for cough, chest tightness, shortness of breath and wheezing  Cardiovascular: Negative for chest pain, palpitations and leg swelling  Gastrointestinal: Positive for abdominal pain and nausea  Negative for constipation, diarrhea and vomiting  Endocrine: Negative for cold intolerance  Genitourinary: Positive for frequency  Negative for difficulty urinating, dysuria and hematuria  Musculoskeletal: Positive for gait problem  Negative for arthralgias  Skin: Positive for wound  Allergic/Immunologic: Negative for environmental allergies  Neurological: Positive for facial asymmetry, weakness and numbness  Negative for dizziness, seizures and light-headedness  Hematological: Does not bruise/bleed easily  Psychiatric/Behavioral: Positive for confusion  Negative for behavioral problems and sleep disturbance  The patient is nervous/anxious  List of Current Medications:    Medication reviewed  All orders signed  Complete list is in the paper chart  Allergies    Allergies   Allergen Reactions   • Dye [Iodinated Contrast Media] Anaphylaxis     IVP dye   • Bactrim [Sulfamethoxazole-Trimethoprim] Hives   • Erythromycin Hives   • Penicillins        Labs/Diagnostics (reviewed by this provider): I personally reviewed lab results and imaging studies  Full reports are in the paper chart  Assessment/Plan:    Acute CVA (cerebrovascular accident) ([facility] )  03/27/23 - Neurological examination is stable and appears unchanged from last documented examination  Continues to have left sided upper and lower extremity weakness and a left sided facial droop along with difficulty with gait due to left foot dragging  Continue to monitor  Last CT of head was performed on 03/07 after an unwitnessed fall   Showed evolving right MCA infarct with associated petechial "hemorrhage (which was best seen on the prior MRI)  Currently on level 1 puree diet with thickened liquids due to dysphagia and left sided facial droop from previous stroke  Continue on Eliquis 5 mg BID and a statin as primary stroke prophylaxis  Continuing following with OT, PT, and SLP  Continue to monitor for any new or changing post-stroke symptoms or pain  Follow-up with neurology for evaluation of multiple strokes in short period of time  UTI (urinary tract infection)  UA and culture completed on 03/03 showing >100,000 CFU with Serratia and Morganella  Started on Ciprofloxacin but switched to Cefpodixime - finished on 03/10  Dysuria resolved  New onset pressure over the bladder within the last week  Episode of foul smelling urine last week that has since resolved  Denies hematuria  Repeat UA based on patient's new symptoms  Treatment pending results  Abdominal pain  Patient notes new onset abdominal discomfort associated with constipation  Has not had a bowel movement since being admitted on 03/25  Start appropriate bowel regime for constipation to help with abdominal discomfort  Bipolar depression ([facility] [facility] )  Continues on Wellbutrin 100 mg BID, Lamictal 150 mg QD at night, and Cymbalta 60 mg BID  Denies any recent mood changes  Mood remains stable at this time  Continue current regimen and continue monitoring patient's mood/stability  AMS (altered mental status)  03/27 - Patient's mental status continues to improve  Notes confusion in the morning when awakening, takes approx  5 minutes to orient herself to where she is  Is alert and oriented x 3  Continue monitoring closely  Fall  Last fall on 03/07 - unwitnessed fall onto her left side which set bed alarm off (based on prior documentation)  03/27 - Has not had any falls since 03/07   Continues to have trouble walking due to \"left foot dragging\" but is able to ambulate using walker with " assistance  Continue fall precautions  Continue use of walker with assistance  Continue working with PT and OT  Hypercalcemia  Followed by Nephrology and Endocrinology  Hypercalcemia suspected to be caused by primary hyperthyroidism  Currently on Sensipar 30 mg QD which is being tolerated well  Calcium continues to decrease, as of 03/27 it is 8 4 (highest peak 12)  Continue monitoring  Continue following up with Nephrology and Endocrinology  Hypokalemia  03/27 - Potassium WNL today at 3 9   Continue monitoring  CHF (congestive heart failure) ([facility] )  Wt Readings from Last 3 Encounters:   03/27/23 61 kg (134 lb 6 4 oz)   03/22/23 62 5 kg (137 lb 12 6 oz)   02/16/23 62 6 kg (138 lb)     Ejection fraction on last admission was 25 to 30%  Seen by cardiology in the hospital was offered LifeVest but patient declined  Continue to monitor daily weights  Follow-up with cardiology           Pain: Experiences chronic pain  Rehab Potential:Good  Patient Informed of Medical Condition: Yes   Patient is Capable of Understanding Their Right: Yes   Discharge Plan: home  Vaccination:   Immunization History   Administered Date(s) Administered   • COVID-19 MODERNA VACC 0 5 ML IM 03/30/2021, 05/12/2021   • H1N1, All Formulations 10/26/2009   • INFLUENZA 09/18/2012, 12/19/2013, 10/23/2014, 11/13/2014, 10/06/2015, 10/13/2016, 12/07/2017, 10/16/2018, 10/30/2020   • Influenza, seasonal, injectable 10/11/2017   • Pneumococcal Conjugate 13-Valent 08/06/2019   • Pneumococcal Polysaccharide PPV23 02/09/2021   • Tdap 02/21/2014, 11/21/2016, 07/21/2021     Advanced Directives: No but desires to create one    Code status:Full Code  PCP: Dr Carlos Alberto Landa MD  Geriatric Medicine  8/98/23113:99 PM

## 2023-03-27 NOTE — ASSESSMENT & PLAN NOTE
03/27/23 - Neurological examination is stable and appears unchanged from last documented examination  Continues to have left sided upper and lower extremity weakness and a left sided facial droop along with difficulty with gait due to left foot dragging  Continue to monitor  Last CT of head was performed on 03/07 after an unwitnessed fall  Showed evolving right MCA infarct with associated petechial hemorrhage (which was best seen on the prior MRI)  Currently on level 1 puree diet with thickened liquids due to dysphagia and left sided facial droop from previous stroke  Continue on Eliquis 5 mg BID and a statin as primary stroke prophylaxis  Continuing following with OT, PT, and SLP  Continue to monitor for any new or changing post-stroke symptoms or pain  Follow-up with neurology for evaluation of multiple strokes in short period of time

## 2023-03-27 NOTE — ASSESSMENT & PLAN NOTE
Continues on Wellbutrin 100 mg BID, Lamictal 150 mg QD at night, and Cymbalta 60 mg BID  Denies any recent mood changes  Mood remains stable at this time  Continue current regimen and continue monitoring patient's mood/stability

## 2023-03-27 NOTE — ASSESSMENT & PLAN NOTE
Wt Readings from Last 3 Encounters:   03/27/23 61 kg (134 lb 6 4 oz)   03/22/23 62 5 kg (137 lb 12 6 oz)   02/16/23 62 6 kg (138 lb)     Ejection fraction on last admission was 25 to 30%  Seen by cardiology in the hospital was offered LifeVest but patient declined  Continue to monitor daily weights  Follow-up with cardiology

## 2023-03-27 NOTE — CASE MANAGEMENT
Team dc Summary: Pt made progress in rehab program, pt was recommended subacute transition to [address]  Dtr transported pt, all DCI was explained

## 2023-03-27 NOTE — ASSESSMENT & PLAN NOTE
Patient notes new onset abdominal discomfort associated with constipation  Has not had a bowel movement since being admitted on 03/25  Start appropriate bowel regime for constipation to help with abdominal discomfort

## 2023-03-27 NOTE — ASSESSMENT & PLAN NOTE
UA and culture completed on 03/03 showing >100,000 CFU with Serratia and Morganella  Started on Ciprofloxacin but switched to Cefpodixime - finished on 03/10  Dysuria resolved  New onset pressure over the bladder within the last week  Episode of foul smelling urine last week that has since resolved  Denies hematuria  Repeat UA based on patient's new symptoms  Treatment pending results

## 2023-03-28 ENCOUNTER — NURSING HOME VISIT (OUTPATIENT)
Dept: GERIATRICS | Facility: OTHER | Age: 68
End: 2023-03-28

## 2023-03-28 DIAGNOSIS — R41.0 DELIRIUM: ICD-10-CM

## 2023-03-28 DIAGNOSIS — S91.002D WOUND OF LEFT ANKLE, SUBSEQUENT ENCOUNTER: ICD-10-CM

## 2023-03-28 DIAGNOSIS — I63.9 ACUTE CVA (CEREBROVASCULAR ACCIDENT) (HCC): Primary | ICD-10-CM

## 2023-03-28 DIAGNOSIS — N39.0 URINARY TRACT INFECTION WITHOUT HEMATURIA, SITE UNSPECIFIED: ICD-10-CM

## 2023-03-28 DIAGNOSIS — R10.84 GENERALIZED ABDOMINAL PAIN: ICD-10-CM

## 2023-03-28 NOTE — ASSESSMENT & PLAN NOTE
Neurological examination remains stable and unchanged from last documented examination  Patient continues to present with left sided facial droop, left sided weakness, and difficulty ambulating due to left foot dragging  Continue monitoring patient's condition closely  Continue on level 1 puree diet with thickened liquids due to dysphagia and left sided facial droop  Encourage increased intake despite decreased appetite  Continue Eliquis 5 mg BID PO and statin for stroke prophylaxis  Continue working with PT, OT, and SLP  Follow up outpatient with neuro

## 2023-03-28 NOTE — ASSESSMENT & PLAN NOTE
Wound care debrided the patient's wound on her left ankle today  Follow wound-care instructions  Patient is denying pain or discomfort at this time

## 2023-03-28 NOTE — PROGRESS NOTES
"[facility]  [phone]  [facility] [place] [facility] of Service: nursing home place of service: POS [facility] Coverage      NAME: Marcella Mendez  AGE: 75 y o  SEX: female [phone]    DATE OF ENCOUNTER: 3/28/2023    Assessment and Plan     Problem List Items Addressed This Visit        Cardiovascular and Mediastinum    Acute CVA (cerebrovascular accident) [facility]) - Primary     Neurological examination remains stable and unchanged from last documented examination  Patient continues to present with left sided facial droop, left sided weakness, and difficulty ambulating due to left foot dragging  Continue monitoring patient's condition closely  Continue on level 1 puree diet with thickened liquids due to dysphagia and left sided facial droop  Encourage increased intake despite decreased appetite  Continue Eliquis 5 mg BID PO and statin for stroke prophylaxis  Continue working with PT, OT, and SLP  Follow up outpatient with neuro  Genitourinary    UTI (urinary tract infection)     Urine analysis and culture done on 03/3 - showed >100,000 CFU with Serratia and Morganella on culture  Finished antibiotic on 03/10  Dysuria resolved  Continues to have pressure over bladder and describes sensation of needing to \"bear down\" every time she needs to urinate  Also admits to urinary frequency  Denies hematuria or urgency  Repeat urine analysis and culture today - treat based on results  Other    Abdominal pain     Patient has abdominal discomfort, nausea, and gas pains  She has had four episodes of diarrhea today after receiving a laxative yesterday  Has not been eating or drinking much due to decreased appetite  Monitor condition closely  Encourage increase in food and fluid intake  Continue nausea medication as needed  AMS (altered mental status)     Patient's mental status continues improving   Notes " "less confusion today but still feels disoriented for a few minutes when awakening in the morning  Alert and oriented x 3  Monitor patient's condition closely  Redirect and reorient as needed  Wound of left ankle     Wound care debrided the patient's wound on her left ankle today  Follow wound-care instructions  Patient is denying pain or discomfort at this time  Chief Complaint     Follow up    History of Present Illness     Dolly Crane is a 75 y o  female who was seen today for follow up  The patient was seen and examined at the bedside  She states that she feels very tired and weak today  Since getting a laxative for her constipation, she has had four episodes of diarrhea today and has been having significant gas pain and abdominal discomfort  She is also very nauseous and states that the medication for nausea is not helping her  She has not had any episodes of vomiting  She has not been eating or drinking much today because she does not have an appetite  She is not sleeping well due to \"bad nightmares\" but states that this is her baseline  She is still experiencing pressure over her bladder and describes a sensation of \"having to bear down\" every time she has to urinate  She has also been urinating more frequently  She denies dysuria, hematuria, or urgency  The following portions of the patient's history were reviewed and updated as appropriate: allergies, current medications, past family history, past medical history, past social history, past surgical history and problem list     Review of Systems     Review of Systems   Constitutional: Positive for appetite change and fatigue  Negative for chills, diaphoresis and fever  HENT: Negative for congestion, rhinorrhea, sore throat and trouble swallowing  Respiratory: Negative for cough, shortness of breath and wheezing  Cardiovascular: Negative for chest pain, palpitations and leg swelling     Gastrointestinal: " Positive for abdominal pain, diarrhea and nausea  Negative for abdominal distention, blood in stool, constipation and vomiting  Endocrine: Negative for cold intolerance and heat intolerance  Genitourinary: Positive for frequency  Negative for difficulty urinating, dysuria, hematuria and urgency  Musculoskeletal: Positive for gait problem  Negative for arthralgias and myalgias  Skin: Positive for pallor  Negative for wound  Wound on left ankle  Allergic/Immunologic: Negative for environmental allergies  Neurological: Positive for facial asymmetry, speech difficulty and weakness  Negative for dizziness, seizures, light-headedness and headaches  Hematological: Does not bruise/bleed easily  Psychiatric/Behavioral: Positive for confusion and sleep disturbance  Negative for agitation and behavioral problems         Active Problem List     Patient Active Problem List   Diagnosis   • Chronic pain syndrome   • Asthma   • Ambulatory dysfunction   • Seronegative arthropathy of multiple sites ([place] [address] )   • Gastroesophageal reflux disease without esophagitis   • Mixed stress and urge urinary incontinence   • CHF (congestive heart failure) ([facility])   • Anemia   • Undifferentiated connective tissue disease ([place] [address] )   • Primary generalized (osteo)arthritis   • Peripheral neuropathy   • Osteoporosis   • Hx of compression fracture of spine   • Lumbar spondylosis   • Cervical spondylosis   • Closed fracture of multiple ribs of right side   • Traumatic pneumothorax   • Closed fracture of transverse process of lumbar vertebra [facility])   • Liver contusion   • Fall   • Acute pain due to trauma   • Right shoulder pain   • Severe protein-calorie malnutrition (HCC)   • Bipolar depression ([place] [address] )   • Rheumatoid arthritis ([place] [address] )   • 'light-for-dates' infant with signs of fetal malnutrition   • LAILA (acute kidney injury) ([place] [address] )   • Polypharmacy   • Cardiomyopathy ([place] [address] )   • Vitamin D insufficiency   • Stroke ([place] [address] )   • HTN (hypertension)   • HFrEF (heart failure with reduced ejection fraction) ([facility])   • Hypercalcemia   • Discoloration of skin of foot   • Abdominal pain   • Acute CVA (cerebrovascular accident) ([facility] )   • History of bacteremia   • Obstructive sleep apnea   • History of stroke   • UTI (urinary tract infection)   • AMS (altered mental status)   • Gout of foot   • Dysphagia   • Wound of left ankle   • Hypokalemia       Objective     Vital Signs:     Blood pressure 114/60 Heart Rate: 95 Respiratory Rate 16   Temperature 97 9 Oxygen Saturation 98% Weight 134 4lbs    Physical Exam  Vitals and nursing note reviewed  Constitutional:       General: She is not in acute distress  Appearance: Normal appearance  She is not diaphoretic  HENT:      Head: Normocephalic and atraumatic  Comments: Left sided facial droop  Nose: No congestion or rhinorrhea  Mouth/Throat:      Mouth: Mucous membranes are moist    Eyes:      General:         Right eye: No discharge  Left eye: No discharge  Pupils: Pupils are equal, round, and reactive to light  Cardiovascular:      Rate and Rhythm: Normal rate and regular rhythm  Heart sounds: Normal heart sounds  No murmur heard  Pulmonary:      Effort: Pulmonary effort is normal  No respiratory distress  Breath sounds: Normal breath sounds  No wheezing  Abdominal:      Palpations: Abdomen is soft  Tenderness: There is abdominal tenderness (generalized)  There is no guarding or rebound  Musculoskeletal:      Cervical back: Normal range of motion and neck supple  Right lower leg: No edema  Left lower leg: No edema  Skin:     General: Skin is warm and dry  Coloration: Skin is pale  Findings: Bruising present  Comments: Wound on left ankle  Neurological:      Mental Status: She is alert and oriented to person, place, and time  Motor: Weakness (left sided) present     Psychiatric:         Mood and Affect: Mood normal          Behavior: Behavior normal          Pertinent Laboratory/Diagnostic Studies:  Laboratory and Imaging studies reviewed  Full report in the paper chart  Current Medications   Medications reviewed and updated in facility chart      Name: Alexia Cabral  : 1955  MRN: [id number]        Eve Justin MD  Geriatric Medicine  3/28/2023 1:57 PM

## 2023-03-28 NOTE — ASSESSMENT & PLAN NOTE
Patient has abdominal discomfort, nausea, and gas pains  She has had four episodes of diarrhea today after receiving a laxative yesterday  Has not been eating or drinking much due to decreased appetite  Monitor condition closely  Encourage increase in food and fluid intake  Continue nausea medication as needed

## 2023-03-28 NOTE — ASSESSMENT & PLAN NOTE
Patient's mental status continues improving  Notes less confusion today but still feels disoriented for a few minutes when awakening in the morning  Alert and oriented x 3  Monitor patient's condition closely  Redirect and reorient as needed

## 2023-03-28 NOTE — ASSESSMENT & PLAN NOTE
"Urine analysis and culture done on 03/3 - showed >100,000 CFU with Serratia and Morganella on culture  Finished antibiotic on 03/10  Dysuria resolved  Continues to have pressure over bladder and describes sensation of needing to \"bear down\" every time she needs to urinate  Also admits to urinary frequency  Denies hematuria or urgency  Repeat urine analysis and culture today - treat based on results     "

## 2023-03-28 NOTE — PROGRESS NOTES
03/28/23 0919   Hello, [Guardian’s Name / Patient’s Shannen Balderas, this is [Caller Shannen Balderas from [facility], and our clinical care team wanted to check on you / your child after your recent visit to the hospital  It will only take 3-5 minutes  Is this a good time? Discharge Call Type/ Specific Diagnosis: General Call   Call Complete   Attempted Number of Calls 1   Discharge phone call complete?  Does not meet criteria  (D/C to SNF)

## 2023-03-29 ENCOUNTER — NURSING HOME VISIT (OUTPATIENT)
Dept: GERIATRICS | Facility: OTHER | Age: 68
End: 2023-03-29

## 2023-03-29 DIAGNOSIS — N39.0 URINARY TRACT INFECTION WITHOUT HEMATURIA, SITE UNSPECIFIED: ICD-10-CM

## 2023-03-29 DIAGNOSIS — R41.0 DELIRIUM: ICD-10-CM

## 2023-03-29 DIAGNOSIS — E87.6 HYPOKALEMIA: ICD-10-CM

## 2023-03-29 DIAGNOSIS — S91.002D WOUND OF LEFT ANKLE, SUBSEQUENT ENCOUNTER: ICD-10-CM

## 2023-03-29 DIAGNOSIS — R00.0 SINUS TACHYCARDIA: ICD-10-CM

## 2023-03-29 DIAGNOSIS — I63.9 ACUTE CVA (CEREBROVASCULAR ACCIDENT) (HCC): Primary | ICD-10-CM

## 2023-03-29 DIAGNOSIS — R10.84 GENERALIZED ABDOMINAL PAIN: ICD-10-CM

## 2023-03-29 NOTE — ASSESSMENT & PLAN NOTE
Continue following wound care instructions for left ankle wound  Patient continues to have no pain at this time

## 2023-03-29 NOTE — ASSESSMENT & PLAN NOTE
Patient's heart rate elevated today  EKG completed showing sinus tachycardia at 113 beats/min grade 1 AV block    Administer 500 mL normal saline bolus  If still tachycardic after bolus administration, continue normal saline 100mL/hr  Continue monitoring heart rate every four hours through the night     Encourage p o  hydration  Consider cardiology consult  Add magnesium to her today's lab  Check CBC CMP in the morning

## 2023-03-29 NOTE — ASSESSMENT & PLAN NOTE
Patient continues to have abdominal discomfort and cramping  She has had 5 episodes of uncontrollable diarrhea today  Appetite continues to be decreased  Hold laxatives  Give banana flakes (thickended) for diarrhea  Consider stool culture if diarrhea does not resolve  Continue nausea medication as needed  Encourage increase in food and fluid intake  Monitor patient's condition closely

## 2023-03-29 NOTE — ASSESSMENT & PLAN NOTE
Neurologic examination remains stable and unchanged  Patient continues to present with left sided facial droop, left sided weakness, and difficulty ambulating due to left foot dragging  Continue to monitor patient's condition  Continue  pureed diet with thickened liquids due to dysphagia and left sided facial droop  Continue Eliquis and statin for stroke prophylaxis  Continue working with PT, OT, and SLP  Follow up outpatient with neuro

## 2023-03-29 NOTE — ASSESSMENT & PLAN NOTE
Urine analysis completed on 03/29  Showed large about of leukocytes  Continues to have pressure over her bladder  Denies dysuria, hematuria, frequency, or urgency  Start Ciprofloxacin 250 mg BID - adjust if needed pending culture

## 2023-03-29 NOTE — PHYSICAL THERAPY NOTE
[facility] PT Discharge Summary  Pt demonstrated fair progress in PT presented to [facility] s/p acute CVA  [facility] stay complicated by UTI with pt requiring 1:1 Supervision/assist for safety due to inc confusion  Pt improved and was able to be weaned off 1:1 before d/c  Pt presented noted impairments on eval include impaired cognition, pain, strength deficits, standing static & dynamic balance impairments, gait dysfunction, dec endurance, impaired proprioception/sensation, impaired vision and L side inattention resulting to significant functional declined  Pt improved from requiring 1-2 person on eval to Min-CGA of 1 person using a RW x 50' or w/c propulsion x 150' at d/c  Although Pt and family education and training performed during [facility] stay but at this time family unable to provide amount of care recommended for home d/c  Pt also completed stroke education series and was instructed with verbal training and modelling for HEP with handout provided to promote maintenance of increased muscle strength and endurance to facilitate additional motor and functional gains  Pt was d/c on 3/25/23 to SNF for further therapy with family support to address ongoing rehab needs yvette including improving standing dynamic balance/tolerance, increase strength, improve activity tolerance, improve overall functional indep to reduce risk for falls and dec caregiver burden and facilitate a home d/c

## 2023-03-29 NOTE — PROGRESS NOTES
[address]  [phone]  [facility] of Service: nursing home place of service: POS 31 Skilled Care-Part A Coverage      NAME: Robin Delatorre  AGE: 75 y o  SEX: female [phone]    DATE OF ENCOUNTER: 3/29/2023    Assessment and Plan     Problem List Items Addressed This Visit        Cardiovascular and Mediastinum    Acute CVA (cerebrovascular accident) [facility]) - Primary     Neurologic examination remains stable and unchanged  Patient continues to present with left sided facial droop, left sided weakness, and difficulty ambulating due to left foot dragging  Continue to monitor patient's condition  Continue  pureed diet with thickened liquids due to dysphagia and left sided facial droop  Continue Eliquis and statin for stroke prophylaxis  Continue working with PT, OT, and SLP  Follow up outpatient with neuro  Genitourinary    UTI (urinary tract infection)     Urine analysis completed on 03/29  Showed large about of leukocytes  Continues to have pressure over her bladder  Denies dysuria, hematuria, frequency, or urgency  Start Ciprofloxacin 250 mg BID - adjust if needed pending culture  Other    Abdominal pain     Patient continues to have abdominal discomfort and cramping  She has had 5 episodes of uncontrollable diarrhea today  Appetite continues to be decreased  Hold laxatives  Give banana flakes (thickended) for diarrhea  Consider stool culture if diarrhea does not resolve  Continue nausea medication as needed  Encourage increase in food and fluid intake  Monitor patient's condition closely  AMS (altered mental status)     Patient's mental status improving  Alert and oriented x 3  Monitor closely  Redirect and reorient as needed  Wound of left ankle     Continue following wound care instructions for left ankle wound     Patient continues to have no pain at this time           Hypokalemia     Potassium decreased to 3 2 today 3/29  Start potassium supplement 40 meq  Continue monitoring CMP  Sinus tachycardia     Patient's heart rate elevated today  EKG completed showing sinus tachycardia at 113 beats/min grade 1 AV block    Administer 500 mL normal saline bolus  If still tachycardic after bolus administration, continue normal saline 100mL/hr  Continue monitoring heart rate every four hours through the night  Encourage p o  hydration  Consider cardiology consult  Add magnesium to her today's lab  Check CBC CMP in the morning            Chief Complaint     Follow up     History of Present Illness     Angelina Matthews is a 75 y o  female who was seen today for follow up  The patient was seen and examined at the bedside  She is doing well today  She has been having frequent episodes of diarrhea that she is unable to control and unable to make it to the bathroom for  She states that she has had 5 episodes today  She reports that she experiences abdominal cramping before the episodes of diarrhea that is relieved after defecation  She denies hematochezia  She is urinating without difficulty, She is still experiencing pressure over her bladder but denies any dysuria, hematuria, urgency, or frequency  She slept well last night  Her appetite has been decreased but she has been trying to eat and drink fluids as much as she can  The following portions of the patient's history were reviewed and updated as appropriate: allergies, current medications, past family history, past medical history, past social history, past surgical history and problem list     Review of Systems     Review of Systems   Constitutional: Positive for appetite change and fatigue  Negative for chills, diaphoresis and fever  HENT: Negative for congestion, rhinorrhea, sneezing and sore throat  Eyes: Negative for visual disturbance     Respiratory: Negative for cough, choking, chest tightness, shortness of breath and wheezing  Cardiovascular: Negative for chest pain, palpitations and leg swelling  Gastrointestinal: Positive for diarrhea and nausea  Negative for abdominal distention, abdominal pain, blood in stool, constipation and vomiting  Endocrine: Negative for cold intolerance and heat intolerance  Genitourinary: Negative for difficulty urinating, dysuria, frequency, hematuria and urgency  Musculoskeletal: Positive for gait problem  Negative for arthralgias, back pain and myalgias  Skin: Positive for pallor and wound (left ankle)  Allergic/Immunologic: Negative for environmental allergies  Neurological: Positive for facial asymmetry, speech difficulty and weakness  Negative for dizziness and seizures  Hematological: Does not bruise/bleed easily  Psychiatric/Behavioral: Negative for behavioral problems and sleep disturbance         Active Problem List     Patient Active Problem List   Diagnosis   • Chronic pain syndrome   • Asthma   • Ambulatory dysfunction   • Seronegative arthropathy of multiple sites ([place] [address] )   • Gastroesophageal reflux disease without esophagitis   • Mixed stress and urge urinary incontinence   • CHF (congestive heart failure) ([facility])   • Anemia   • Undifferentiated connective tissue disease ([place] [address] )   • Primary generalized (osteo)arthritis   • Peripheral neuropathy   • Osteoporosis   • Hx of compression fracture of spine   • Lumbar spondylosis   • Cervical spondylosis   • Closed fracture of multiple ribs of right side   • Traumatic pneumothorax   • Closed fracture of transverse process of lumbar vertebra [facility])   • Liver contusion   • Fall   • Acute pain due to trauma   • Right shoulder pain   • Severe protein-calorie malnutrition (HCC)   • Bipolar depression ([place] [address] )   • Rheumatoid arthritis ([place] [address] )   • 'light-for-dates' infant with signs of fetal malnutrition   • LAILA (acute kidney injury) ([place] [address] )   • Polypharmacy   • Cardiomyopathy ([place] [address] )   • Vitamin D insufficiency   • Stroke ([facility] [address] )   • HTN (hypertension)   • HFrEF (heart failure with reduced ejection fraction) ([facility])   • Hypercalcemia   • Discoloration of skin of foot   • Abdominal pain   • Acute CVA (cerebrovascular accident) ([facility] [address] )   • History of bacteremia   • Obstructive sleep apnea   • History of stroke   • UTI (urinary tract infection)   • AMS (altered mental status)   • Gout of foot   • Dysphagia   • Wound of left ankle   • Hypokalemia   • Sinus tachycardia       Objective     Vital Signs:     Blood pressure 110/70 Heart Rate: 108 Respiratory Rate 17   Temperature 97 0F Oxygen Saturation 97% Weight 134 4lb    Physical Exam  Vitals and nursing note reviewed  Constitutional:       General: She is not in acute distress  Appearance: Normal appearance  She is not diaphoretic  HENT:      Head: Normocephalic and atraumatic  Nose: Nose normal  No congestion or rhinorrhea  Eyes:      General:         Right eye: No discharge  Left eye: No discharge  Pupils: Pupils are equal, round, and reactive to light  Cardiovascular:      Rate and Rhythm: Normal rate and regular rhythm  Heart sounds: Normal heart sounds  No murmur heard  Pulmonary:      Effort: Pulmonary effort is normal  No respiratory distress  Breath sounds: Normal breath sounds  No wheezing  Abdominal:      General: Bowel sounds are normal       Palpations: Abdomen is soft  Tenderness: There is abdominal tenderness (generalized)  There is no guarding or rebound  Musculoskeletal:      Cervical back: Normal range of motion and neck supple  Right lower leg: No edema  Left lower leg: No edema  Skin:     General: Skin is warm and dry  Coloration: Skin is pale  Findings: Bruising present  Comments: Wound on left ankle   Neurological:      Mental Status: She is alert and oriented to person, place, and time  Motor: Weakness (left sided) present     Psychiatric:         Behavior: Behavior normal          Pertinent Laboratory/Diagnostic Studies:  Laboratory and Imaging studies reviewed  Full report in the paper chart  Current Medications   Medications reviewed and updated in facility chart      Name: Leonel Carrillo  : 1955  MRN: [id number]        Kamilah Good MD  Geriatric Medicine  3/29/2023 6:11 PM

## 2023-03-29 NOTE — ASSESSMENT & PLAN NOTE
Patient's mental status improving  Alert and oriented x 3  Monitor closely  Redirect and reorient as needed

## 2023-03-29 NOTE — SPEECH THERAPY NOTE
[facility] Speech Therapy Discharge Summary    Pt admitted to [facility] on 2/28/2023 dx Right MCA CVA and new small acute infarction in the right cerebral peduncle  Pt completed bedside swallow assessment in which pt presented with moderate oropharyngeal deficits to where current diet recommendations are for puree/thin liquids by cup only  Pt completed VFSS on 3/9/2023 where pt demonstrating moderate to severe oropharyngeal dysphagia characterized by weak lip seal, anterior loss, decreased control of boluses, slow bolus manipulation, prolonged mastication, reduced bolus formation, delayed transfers, weak BOT retraction, reduced hyolaryngeal excursion, delayed swallow initiation, weak pharyngeal constriction which resulted in pharyngeal retention, and reduced airway protection  Pt exhibited laryngeal penetration across liquids, along with silent aspiration events with both thin liquids, as well as nectar thick taken by straw sips  Pt was then recommended to continue puree diet with change to nectar thick liquids by cup only  As pt demo limited independent carryover of recommended safe swallow strategies, pt is recommended to continue to have full supervision during meals to ensure carryover of these strategies  Pt's family was educated on results of VFSS, along with current diet recommendations  In regards to cognition, pt completed the CLQT+ which pt presented with deficits in attention, visual attention (L inattention), decreased ST/working memory, decreased executive functions (problem solving, reasoning, sequencing, organization of thoughts), decreased judgement and insight to overall deficits impacting safety and functional mobility       Pt also completed Motor Speech assessment given dysarthria and presented overall moderately dysarthria characterized by decreased breath support for speech, fast rate of speech, low volume and imprecise articulation of sounds, which impacts speech intelligibility at all levels of speech  Education provided on incentive spirometer however pt requires cues and encouragement for completion as well as continuous review for utilizing compensatory speech strategies  Pt was able to achieve most of her goals- mod A problem solving, min A memory, comprehension and expression and supervision social interaction  Due to decreased support at home and cont'd need for further therapy, pt was recommended for subacute rehab discharge  Pt was successfully discharged to [address] [place] [address] on 3/25/023 with recommendations for further skilled SLP services to increase independence and decreased burden of care

## 2023-03-30 ENCOUNTER — NURSING HOME VISIT (OUTPATIENT)
Dept: GERIATRICS | Facility: OTHER | Age: 68
End: 2023-03-30

## 2023-03-30 DIAGNOSIS — I63.9 ACUTE CVA (CEREBROVASCULAR ACCIDENT) (HCC): Primary | ICD-10-CM

## 2023-03-30 DIAGNOSIS — E83.42 HYPOMAGNESEMIA: ICD-10-CM

## 2023-03-30 DIAGNOSIS — R00.0 SINUS TACHYCARDIA: ICD-10-CM

## 2023-03-30 DIAGNOSIS — E87.6 HYPOKALEMIA: ICD-10-CM

## 2023-03-30 NOTE — PROGRESS NOTES
[address]  [phone]  [facility] 79 of Service: nursing home place of service: POS 31 Skilled Care-Part A Coverage      NAME: Dolly Crane  AGE: 75 y o  SEX: female [phone]    DATE OF ENCOUNTER: 3/30/2023    Assessment and Plan     Problem List Items Addressed This Visit        Cardiovascular and Mediastinum    Acute CVA (cerebrovascular accident) [facility]) - Primary     Neurological examination stable and unchanged  Patient continues to have left sided facial droop, left sided weakness, and difficulty ambulating  Continue monitoring patient's condition  Continue pureed diet with thickened liquids  Continue Eliquis and statin  Continue working with OT, PT, and SLP  Other    Hypokalemia     Potassium increased to 3 6 today 3/30  Continue potassium supplement PO 20 meq  Continue to monitor CMP  Sinus tachycardia     Patient's  today  Continue normal saline 100 mL/hr  Continue to monitor heart rate every four hours  Encourage PO hydration  Consult cardiology  Hypomagnesemia     Patient's magnesium dropped to 1 3 today 3/30  Administer 2g IV magnesium now and another 2g IV magnesium later today  Recheck magnesium level tomorrow  Chief Complaint     Follow up     History of Present Illness     Dolly Crane is a 75 y o  female who was seen today for follow up  The patient was seen and examined at the bedside  She is feeling much better today and is in a much better mood  She states that she has not had an episode of diarrhea since eating the banana flakes and that her stomach feels much better today  She has not had a bowel movement since yesterday  She is urinating without difficulty and states that the pressure over her bladder has resolved  She denies dysuria, hematuria, frequency, or urgency   Her appetite is preserved and she is eating and drinking without difficulty  She slept well last night  She denies any acute complaints today  The following portions of the patient's history were reviewed and updated as appropriate: allergies, current medications, past family history, past medical history, past social history, past surgical history and problem list     Review of Systems     Review of Systems   Constitutional: Negative for appetite change, chills, diaphoresis, fatigue and fever  HENT: Positive for trouble swallowing  Negative for congestion and rhinorrhea  Eyes: Negative for visual disturbance  Respiratory: Negative for cough, shortness of breath and wheezing  Cardiovascular: Negative for chest pain, palpitations and leg swelling  Gastrointestinal: Negative for abdominal distention, abdominal pain, constipation, diarrhea, nausea and vomiting  Endocrine: Negative for cold intolerance and heat intolerance  Genitourinary: Negative for difficulty urinating, dysuria, frequency, hematuria and urgency  Musculoskeletal: Positive for gait problem  Negative for back pain and neck pain  Skin: Positive for pallor and wound (left ankle)  Allergic/Immunologic: Negative for environmental allergies  Neurological: Positive for facial asymmetry, speech difficulty and weakness  Negative for dizziness, seizures, light-headedness and headaches  Hematological: Bruises/bleeds easily  Psychiatric/Behavioral: Negative for behavioral problems and sleep disturbance         Active Problem List     Patient Active Problem List   Diagnosis   • Chronic pain syndrome   • Asthma   • Ambulatory dysfunction   • Seronegative arthropathy of multiple sites ([place] [address] )   • Gastroesophageal reflux disease without esophagitis   • Mixed stress and urge urinary incontinence   • CHF (congestive heart failure) ([facility])   • Anemia   • Undifferentiated connective tissue disease ([place] [address] )   • Primary generalized (osteo)arthritis   • Peripheral neuropathy   • Osteoporosis   • Hx of compression fracture of spine   • Lumbar spondylosis   • Cervical spondylosis   • Closed fracture of multiple ribs of right side   • Traumatic pneumothorax   • Closed fracture of transverse process of lumbar vertebra [facility])   • Liver contusion   • Fall   • Acute pain due to trauma   • Right shoulder pain   • Severe protein-calorie malnutrition (HCC)   • Bipolar depression ([place] [address] )   • Rheumatoid arthritis ([place] [address] )   • 'light-for-dates' infant with signs of fetal malnutrition   • LAILA (acute kidney injury) ([place] [address] )   • Polypharmacy   • Cardiomyopathy ([place] [address] )   • Vitamin D insufficiency   • Stroke ([place] [address] )   • HTN (hypertension)   • HFrEF (heart failure with reduced ejection fraction) ([facility])   • Hypercalcemia   • Discoloration of skin of foot   • Abdominal pain   • Acute CVA (cerebrovascular accident) ([place] [address] )   • History of bacteremia   • Obstructive sleep apnea   • History of stroke   • UTI (urinary tract infection)   • AMS (altered mental status)   • Gout of foot   • Dysphagia   • Wound of left ankle   • Hypokalemia   • Sinus tachycardia   • Hypomagnesemia       Objective     Vital Signs:     Blood pressure 116/77 Heart Rate: 107 Respiratory Rate 18   Temperature 97 0 Oxygen Saturation 95% Weight 136 0lb    Physical Exam  Vitals and nursing note reviewed  Constitutional:       General: She is not in acute distress  Appearance: Normal appearance  She is not diaphoretic  HENT:      Head: Normocephalic and atraumatic  Nose: Nose normal  No congestion or rhinorrhea  Mouth/Throat:      Mouth: Mucous membranes are moist    Eyes:      General:         Right eye: No discharge  Left eye: No discharge  Pupils: Pupils are equal, round, and reactive to light  Cardiovascular:      Rate and Rhythm: Normal rate and regular rhythm  Heart sounds: Normal heart sounds  No murmur heard  Pulmonary:      Effort: Pulmonary effort is normal  No respiratory distress  Breath sounds: Normal breath sounds  No wheezing  Abdominal:      Palpations: Abdomen is soft  Tenderness: There is no abdominal tenderness  There is no guarding or rebound  Musculoskeletal:      Cervical back: Normal range of motion and neck supple  Right lower leg: No edema  Left lower leg: No edema  Skin:     General: Skin is warm and dry  Coloration: Skin is pale  Findings: Bruising present  Comments: Wound on left ankle   Neurological:      Mental Status: She is alert and oriented to person, place, and time  Motor: Weakness (left sided) present  Psychiatric:         Behavior: Behavior normal          Pertinent Laboratory/Diagnostic Studies:  Laboratory and Imaging studies reviewed  Full report in the paper chart  Current Medications   Medications reviewed and updated in facility chart      Name: Prince Lockhart  : 1955  MRN: [id number]        Jessica Hatfield MD  Geriatric Medicine  3/30/2023 11:48 AM

## 2023-03-30 NOTE — ASSESSMENT & PLAN NOTE
Patient's magnesium dropped to 1 3 today 3/30  Administer 2g IV magnesium now and another 2g IV magnesium later today  Recheck magnesium level tomorrow

## 2023-03-30 NOTE — ASSESSMENT & PLAN NOTE
Potassium increased to 3 6 today 3/30  Continue potassium supplement PO 20 meq  Continue to monitor CMP

## 2023-03-30 NOTE — ASSESSMENT & PLAN NOTE
Patient's  today  Continue normal saline 100 mL/hr  Continue to monitor heart rate every four hours  Encourage PO hydration  Consult cardiology

## 2023-03-30 NOTE — ASSESSMENT & PLAN NOTE
Neurological examination stable and unchanged  Patient continues to have left sided facial droop, left sided weakness, and difficulty ambulating  Continue monitoring patient's condition  Continue pureed diet with thickened liquids  Continue Eliquis and statin  Continue working with OT, PT, and SLP

## 2023-03-31 ENCOUNTER — NURSING HOME VISIT (OUTPATIENT)
Dept: GERIATRICS | Facility: OTHER | Age: 68
End: 2023-03-31

## 2023-03-31 DIAGNOSIS — R00.0 SINUS TACHYCARDIA: ICD-10-CM

## 2023-03-31 DIAGNOSIS — E87.6 HYPOKALEMIA: ICD-10-CM

## 2023-03-31 DIAGNOSIS — G89.4 CHRONIC PAIN SYNDROME: ICD-10-CM

## 2023-03-31 DIAGNOSIS — I63.9 ACUTE CVA (CEREBROVASCULAR ACCIDENT) (HCC): Primary | ICD-10-CM

## 2023-03-31 DIAGNOSIS — E83.42 HYPOMAGNESEMIA: ICD-10-CM

## 2023-03-31 NOTE — PROGRESS NOTES
[address]  [phone]  [facility] 79 of Service: nursing home place of service: POS 31 Skilled Care-Part A Coverage      NAME: Jimbo Pelaez  AGE: 75 y o  SEX: female [phone]    DATE OF ENCOUNTER: 3/31/2023    Assessment and Plan     Problem List Items Addressed This Visit        Cardiovascular and Mediastinum    Acute CVA (cerebrovascular accident) [facility]) - Primary     Neurological examination stable and unchanged  Continues to have left sided facial droop, left sided weakness, and difficulty with ambulating  Continue to work with OT, PT, and SLP  Continue Eliquis and statin as stroke prophylaxis  Continued pureed diet with thickened liquids  Continue monitoring condition  Other    Chronic pain syndrome     Patient continues to have chronic pain due to compression fracture in thoracic spine and right sided rib fractures  Complains of an inability to take a deep breath on her right side due to the pain last night  Patient also complains of intermittent headaches since yesterday  -Apply lidocaine patch to right side to alleviate pain    -Continue Tylenol as needed for pain/headache  Hypokalemia     Potassium 3 4 today 3/31    - Increase potassium supplement to 20 meq PO twice daily  - Continue to monitor CMP  Sinus tachycardia     Patient's HR today is 89  Cardiology saw patient on 3/30 - appreciate input    - Discontinue IV fluids   - Start losartan 25 mg QD as long as systolic BP > 830 mmHg   - Continue metoprolol 25 mg QD  - Consider ischemic heart evaluation after completion of rehabilitation          Hypomagnesemia     Patient's magnesium up to 1 9 and in WNL today 3/30  Repeat magnesium level on Monday 04/03  Chief Complaint     Follow up    History of Present Illness     Jimbo Pelaez is a 75 y o  female who was seen today for follow up     The patient was seen and examined at the bedside  She continues to feel well today and states that she was able to sleep good last night  She has not had a bowel movement in the last two days but denies any abdominal pain or gas pains  She is urinating without any issues and reports that the pressure over her bladder is still resolved  She denies dysuria, hematuria, frequency, and urgency  Her appetite is preserved but she has been experiencing some nausea when she tries to eat  However, she is still eating and drinking fluids without issue  She has been experiencing intermittent headaches since yesterday but states that Tylenol alleviates the pain  She also mentions that she feels like she is unable to take a deep breath on her right side due to broken ribs she obtained last year  The following portions of the patient's history were reviewed and updated as appropriate: allergies, current medications, past family history, past medical history, past social history, past surgical history and problem list     Review of Systems     Review of Systems   Constitutional: Negative for activity change, appetite change, chills, diaphoresis, fatigue and fever  HENT: Positive for trouble swallowing  Negative for congestion, hearing loss, rhinorrhea, sore throat and voice change  Eyes: Negative for visual disturbance  Respiratory: Negative for cough, choking, chest tightness, shortness of breath and wheezing  Cardiovascular: Negative for chest pain, palpitations and leg swelling  Gastrointestinal: Positive for nausea  Negative for abdominal distention, abdominal pain, blood in stool, constipation, diarrhea and vomiting  Endocrine: Negative for cold intolerance and heat intolerance  Genitourinary: Negative for difficulty urinating, dysuria, frequency, hematuria and urgency  Musculoskeletal: Positive for back pain (chronic lower back pain) and gait problem  Skin: Positive for pallor and wound (left ankle)  Allergic/Immunologic: Negative for environmental allergies  Neurological: Positive for facial asymmetry, speech difficulty, weakness (left sided) and headaches  Negative for dizziness and seizures  Hematological: Bruises/bleeds easily  Psychiatric/Behavioral: Negative for behavioral problems and sleep disturbance         Active Problem List     Patient Active Problem List   Diagnosis   • Chronic pain syndrome   • Asthma   • Ambulatory dysfunction   • Seronegative arthropathy of multiple sites ([place] [address] )   • Gastroesophageal reflux disease without esophagitis   • Mixed stress and urge urinary incontinence   • CHF (congestive heart failure) ([facility])   • Anemia   • Undifferentiated connective tissue disease ([facility])   • Primary generalized (osteo)arthritis   • Peripheral neuropathy   • Osteoporosis   • Hx of compression fracture of spine   • Lumbar spondylosis   • Cervical spondylosis   • Closed fracture of multiple ribs of right side   • Traumatic pneumothorax   • Closed fracture of transverse process of lumbar vertebra [facility])   • Liver contusion   • Fall   • Acute pain due to trauma   • Right shoulder pain   • Severe protein-calorie malnutrition ([facility])   • Bipolar depression ([place] [address] )   • Rheumatoid arthritis ([place] [address] )   • 'light-for-dates' infant with signs of fetal malnutrition   • LAILA (acute kidney injury) ([place] [address] )   • Polypharmacy   • Cardiomyopathy ([place] [address] )   • Vitamin D insufficiency   • Stroke ([place] [address] )   • HTN (hypertension)   • HFrEF (heart failure with reduced ejection fraction) ([facility])   • Hypercalcemia   • Discoloration of skin of foot   • Abdominal pain   • Acute CVA (cerebrovascular accident) ([place] [address] )   • History of bacteremia   • Obstructive sleep apnea   • History of stroke   • UTI (urinary tract infection)   • AMS (altered mental status)   • Gout of foot   • Dysphagia   • Wound of left ankle   • Hypokalemia   • Sinus tachycardia   • Hypomagnesemia       Objective     Vital Signs:     Blood pressure 110/62 Heart Rate: 89 Respiratory Rate 21   Temperature 97 8F Oxygen Saturation 98% Weight 136 lb    Physical Exam  Vitals and nursing note reviewed  Constitutional:       General: She is not in acute distress  Appearance: Normal appearance  She is not diaphoretic  HENT:      Head: Normocephalic and atraumatic  Nose: Nose normal  No congestion or rhinorrhea  Mouth/Throat:      Mouth: Mucous membranes are moist    Eyes:      General:         Right eye: No discharge  Left eye: No discharge  Pupils: Pupils are equal, round, and reactive to light  Cardiovascular:      Rate and Rhythm: Normal rate and regular rhythm  Heart sounds: Normal heart sounds  No murmur heard  Pulmonary:      Effort: Pulmonary effort is normal  No respiratory distress  Breath sounds: Normal breath sounds  No wheezing  Abdominal:      General: Bowel sounds are normal  There is no distension  Palpations: Abdomen is soft  There is no mass  Tenderness: There is no abdominal tenderness  There is no guarding or rebound  Musculoskeletal:         General: No tenderness  Cervical back: Normal range of motion and neck supple  Right lower leg: No edema  Left lower leg: No edema  Skin:     General: Skin is warm and dry  Coloration: Skin is pale  Findings: Bruising present  Comments: Wound on left ankle   Neurological:      Mental Status: She is alert and oriented to person, place, and time  Motor: Weakness (left sided) present  Psychiatric:         Mood and Affect: Mood normal          Behavior: Behavior normal          Pertinent Laboratory/Diagnostic Studies:  Laboratory and Imaging studies reviewed  Full report in the paper chart  Current Medications   Medications reviewed and updated in facility chart      Name: Camilla Villa  : 1955  MRN: [id number]        Mary Blancas MD  Geriatric Medicine  3/31/2023 9:28 AM

## 2023-03-31 NOTE — ASSESSMENT & PLAN NOTE
Neurological examination stable and unchanged  Continues to have left sided facial droop, left sided weakness, and difficulty with ambulating  Continue to work with OT, PT, and SLP  Continue Eliquis and statin as stroke prophylaxis  Continued pureed diet with thickened liquids  Continue monitoring condition

## 2023-03-31 NOTE — ASSESSMENT & PLAN NOTE
Patient continues to have chronic pain due to compression fracture in thoracic spine and right sided rib fractures  Complains of an inability to take a deep breath on her right side due to the pain last night  Patient also complains of intermittent headaches since yesterday  -Apply lidocaine patch to right side to alleviate pain    -Continue Tylenol as needed for pain/headache

## 2023-03-31 NOTE — ASSESSMENT & PLAN NOTE
Potassium 3 4 today 3/31    - Increase potassium supplement to 20 meq PO twice daily  - Continue to monitor CMP

## 2023-03-31 NOTE — ASSESSMENT & PLAN NOTE
Patient's HR today is 89       Cardiology saw patient on 3/30 - appreciate input    - Discontinue IV fluids   - Start losartan 25 mg QD as long as systolic BP > 249 mmHg   - Continue metoprolol 25 mg QD  - Consider ischemic heart evaluation after completion of rehabilitation negative...

## 2023-04-03 ENCOUNTER — NURSING HOME VISIT (OUTPATIENT)
Dept: GERIATRICS | Facility: OTHER | Age: 68
End: 2023-04-03

## 2023-04-03 DIAGNOSIS — G47.00 INSOMNIA, UNSPECIFIED TYPE: ICD-10-CM

## 2023-04-03 DIAGNOSIS — I63.9 ACUTE CVA (CEREBROVASCULAR ACCIDENT) (HCC): Primary | ICD-10-CM

## 2023-04-03 DIAGNOSIS — E43 SEVERE PROTEIN-CALORIE MALNUTRITION (HCC): ICD-10-CM

## 2023-04-03 DIAGNOSIS — E83.42 HYPOMAGNESEMIA: ICD-10-CM

## 2023-04-03 DIAGNOSIS — I10 PRIMARY HYPERTENSION: ICD-10-CM

## 2023-04-03 NOTE — ASSESSMENT & PLAN NOTE
Blood pressure in the lower side    Patient denies any symptoms  Encourage p o  hydration  Hold losartan for now  Continue to monitor

## 2023-04-03 NOTE — PROGRESS NOTES
[address]  [phone]  [facility] 79 of Service: nursing home place of service: POS 31 Skilled Care-Part A Coverage      NAME: Mignon Phalen  AGE: 75 y o  SEX: female [phone]    DATE OF ENCOUNTER: 4/3/2023    Assessment and Plan     Problem List Items Addressed This Visit        Cardiovascular and Mediastinum    HTN (hypertension)     Blood pressure in the lower side  Patient denies any symptoms  Encourage p o  hydration  Hold losartan for now  Continue to monitor         Acute CVA (cerebrovascular accident) [facility]) - Primary     Neurological examination stable and unchanged  Continues to have left sided facial droop, left sided weakness, and difficulty with ambulating       Continue to work with OT, PT, and SLP  Continue Eliquis and statin/continue good blood pressure control  Continued pureed diet with thickened liquids  Other    Severe protein-calorie malnutrition ([address] )     Encourage protein supplement  Monitor weight  Dietitian to follow           Hypomagnesemia     Magnesium today 1 6  We will give magnesium 2 g IV  Increase magnesium oxide to 800 mg twice daily  Repeat magnesium level in the morning  Continue potassium supplement         Insomnia     We will add melatonin 3 mg nightly to her regimen  Encourage sleep hygiene  Continue to monitor                Chief Complaint     Follow up     History of Present Illness     Mignon Phalen is a 75 y o  female who was seen today for follow up  Patient seen and examined at bedside  States that she feels better today  Dates that her dysphagia improved, feels slightly improvement on left-sided weakness  Denies palpitations chest pain  No dizziness or lightheadedness  Bowel movement regular    No dysuria          The following portions of the patient's history were reviewed and updated as appropriate: allergies, current medications, past family history, past medical history, past social history, past surgical history and problem list     Review of Systems     Review of Systems   Constitutional: Negative for chills and fever  HENT: Negative for congestion and rhinorrhea  Respiratory: Positive for shortness of breath (intermittent with exertion)  Negative for cough and wheezing  Cardiovascular: Negative for chest pain, palpitations and leg swelling  Gastrointestinal: Negative for abdominal pain and constipation  Endocrine: Negative for cold intolerance  Genitourinary: Negative for difficulty urinating, dysuria and hematuria  Musculoskeletal: Positive for gait problem  Skin: Positive for wound  Allergic/Immunologic: Negative for environmental allergies  Neurological: Positive for weakness  Negative for dizziness and seizures  Hematological: Does not bruise/bleed easily  Psychiatric/Behavioral: Positive for sleep disturbance  Negative for behavioral problems         Active Problem List     Patient Active Problem List   Diagnosis   • Chronic pain syndrome   • Asthma   • Ambulatory dysfunction   • Seronegative arthropathy of multiple sites ([place] [address] )   • Gastroesophageal reflux disease without esophagitis   • Mixed stress and urge urinary incontinence   • CHF (congestive heart failure) ([facility])   • Anemia   • Undifferentiated connective tissue disease ([place] [address] )   • Primary generalized (osteo)arthritis   • Peripheral neuropathy   • Osteoporosis   • Hx of compression fracture of spine   • Lumbar spondylosis   • Cervical spondylosis   • Closed fracture of multiple ribs of right side   • Traumatic pneumothorax   • Closed fracture of transverse process of lumbar vertebra [facility])   • Liver contusion   • Fall   • Acute pain due to trauma   • Right shoulder pain   • Severe protein-calorie malnutrition (HCC)   • Bipolar depression ([facility] )   • Rheumatoid arthritis ([place] [address] )   • 'light-for-dates' infant with signs of fetal malnutrition   • LAILA (acute kidney injury) ([place] [address] )   • Polypharmacy   • Cardiomyopathy ([place] [address] )   • Vitamin D insufficiency   • Stroke ([place] [address] )   • HTN (hypertension)   • HFrEF (heart failure with reduced ejection fraction) ([facility])   • Hypercalcemia   • Discoloration of skin of foot   • Abdominal pain   • Acute CVA (cerebrovascular accident) ([place] [address] )   • History of bacteremia   • Obstructive sleep apnea   • History of stroke   • UTI (urinary tract infection)   • AMS (altered mental status)   • Gout of foot   • Dysphagia   • Wound of left ankle   • Hypokalemia   • Sinus tachycardia   • Hypomagnesemia   • Insomnia       Objective     Vital Signs:     Blood pressure 92/52 Heart Rate: 88 Respiratory Rate 20   Temperature 97 8 Oxygen Saturation 97% Weight 138lbs    Physical Exam  Vitals and nursing note reviewed  Constitutional:       General: She is not in acute distress  Appearance: She is not diaphoretic  Comments: Frail looking   HENT:      Head: Normocephalic and atraumatic  Mouth/Throat:      Mouth: Mucous membranes are dry  Eyes:      General:         Right eye: No discharge  Left eye: No discharge  Pupils: Pupils are equal, round, and reactive to light  Cardiovascular:      Rate and Rhythm: Regular rhythm  Tachycardia present  Heart sounds: Normal heart sounds  No murmur heard  Pulmonary:      Effort: Pulmonary effort is normal  No respiratory distress  Breath sounds: Normal breath sounds  No wheezing  Abdominal:      Palpations: Abdomen is soft  Tenderness: There is no abdominal tenderness  There is no guarding or rebound  Musculoskeletal:      Cervical back: Normal range of motion and neck supple  Skin:     General: Skin is warm and dry  Neurological:      Mental Status: She is alert and oriented to person, place, and time  Mental status is at baseline  Cranial Nerves: Cranial nerve deficit present  Motor: Weakness (left sided) present     Psychiatric:         Behavior: Behavior normal          Pertinent Laboratory/Diagnostic Studies:  Laboratory and Imaging studies reviewed  Full report in the paper chart  Current Medications   Medications reviewed and updated in facility chart      Name: Amaris Dawson  : 1955  MRN: [id number]        Latrice Turner MD  Geriatric Medicine  4/3/2023 3:14 PM

## 2023-04-03 NOTE — ASSESSMENT & PLAN NOTE
Neurological examination stable and unchanged  Continues to have left sided facial droop, left sided weakness, and difficulty with ambulating       Continue to work with OT, PT, and SLP  Continue Eliquis and statin/continue good blood pressure control  Continued pureed diet with thickened liquids

## 2023-04-03 NOTE — ASSESSMENT & PLAN NOTE
Magnesium today 1 6  We will give magnesium 2 g IV  Increase magnesium oxide to 800 mg twice daily  Repeat magnesium level in the morning  Continue potassium supplement

## 2023-04-05 ENCOUNTER — NURSING HOME VISIT (OUTPATIENT)
Dept: GERIATRICS | Facility: OTHER | Age: 68
End: 2023-04-05

## 2023-04-05 DIAGNOSIS — E43 SEVERE PROTEIN-CALORIE MALNUTRITION (HCC): ICD-10-CM

## 2023-04-05 DIAGNOSIS — G47.00 INSOMNIA, UNSPECIFIED TYPE: ICD-10-CM

## 2023-04-05 DIAGNOSIS — I63.9 ACUTE CVA (CEREBROVASCULAR ACCIDENT) (HCC): Primary | ICD-10-CM

## 2023-04-05 DIAGNOSIS — M47.816 LUMBAR SPONDYLOSIS: ICD-10-CM

## 2023-04-05 DIAGNOSIS — S91.002D WOUND OF LEFT ANKLE, SUBSEQUENT ENCOUNTER: ICD-10-CM

## 2023-04-05 NOTE — ASSESSMENT & PLAN NOTE
Neurological exam stable and unchanged  Continues to have left sided facial droop, left sided weakness, and difficulty ambulating  Continue to work with OT, PT, and SLP  Continue Eliquis and stain and maintain good BP control  Continue pureed diet with thickened liquids

## 2023-04-05 NOTE — PROGRESS NOTES
[address]  [phone]  [facility] 79 of Service: nursing home place of service: POS 31 Skilled Care-Part A Coverage      NAME: Dolly Crane  AGE: 75 y o  SEX: female [phone]    DATE OF ENCOUNTER: 4/5/2023    Assessment and Plan     Problem List Items Addressed This Visit        Cardiovascular and Mediastinum    Acute CVA (cerebrovascular accident) ([address] ) - Primary     Neurological exam stable and unchanged  Continues to have left sided facial droop, left sided weakness, and difficulty ambulating  Continue to work with OT, PT, and SLP  Continue Eliquis and stain and maintain good BP control  Continue pureed diet with thickened liquids  Musculoskeletal and Integument    Lumbar spondylosis     Patient reports lower back pain  - Apply lidocaine patch over area of pain  - Continue to monitor  Other    Severe protein-calorie malnutrition (HCC)     Continue protein supplement  Continue to monitor weight  Encourage increase in hydration  Repeat lab work tomorrow morning  Wound of left ankle     Wound care evaluated patient's left ankle wound yesterday 4/3    - Continue to follow wound care instructions  - Follow up outpatient with wound care within one week of discharge  Insomnia     Patient continues to have trouble sleeping through the night    - Continue melatonin 3 mg QD at bedtime    - Start Trazodone 25 mg QD at bedtime    - Increase gabapentin to 300 mg at bedtime    - Encourage sleep hygiene   - Continue to monitor             Chief Complaint     Follow up     History of Present Illness     Dolly Crane is a 75 y o  female who was seen today for follow up  The patient was seen and examined at the bedside  She is doing well today  She notes that she is still having trouble staying to sleep at night   Her appetite is preserved and she is trying to eat and drink more fluids  She denies nausea or vomiting  She had a bowel movement of normal consistency this morning and denies constipation, diarrhea, or hematochezia  She is urinating without difficulty but notes foul-smelling urine today  She denies dysuria, urgency, frequency, or hematuria  She notes lower back pain today that was exacerbated by PT  The following portions of the patient's history were reviewed and updated as appropriate: allergies, current medications, past family history, past medical history, past social history, past surgical history and problem list     Review of Systems     Review of Systems   Constitutional: Negative for appetite change, chills, diaphoresis, fatigue and fever  HENT: Negative for congestion, rhinorrhea and sore throat  Eyes: Negative for visual disturbance  Respiratory: Negative for cough, chest tightness, shortness of breath and wheezing  Cardiovascular: Negative for chest pain, palpitations and leg swelling  Gastrointestinal: Negative for abdominal pain, blood in stool, constipation, diarrhea, nausea and vomiting  Endocrine: Negative for cold intolerance  Genitourinary: Negative for decreased urine volume, difficulty urinating, dysuria and hematuria  Musculoskeletal: Positive for back pain (lower back pain) and gait problem  Negative for joint swelling and neck stiffness  Skin: Positive for wound  Allergic/Immunologic: Negative for environmental allergies  Neurological: Positive for facial asymmetry, speech difficulty and weakness  Negative for dizziness and seizures  Hematological: Does not bruise/bleed easily  Psychiatric/Behavioral: Positive for sleep disturbance  Negative for behavioral problems and confusion         Active Problem List     Patient Active Problem List   Diagnosis   • Chronic pain syndrome   • Asthma   • Ambulatory dysfunction   • Seronegative arthropathy of multiple sites [facility])   • Gastroesophageal reflux disease without esophagitis   • Mixed stress and urge urinary incontinence   • CHF (congestive heart failure) ([facility])   • Anemia   • Undifferentiated connective tissue disease ([facility])   • Primary generalized (osteo)arthritis   • Peripheral neuropathy   • Osteoporosis   • Hx of compression fracture of spine   • Lumbar spondylosis   • Cervical spondylosis   • Closed fracture of multiple ribs of right side   • Traumatic pneumothorax   • Closed fracture of transverse process of lumbar vertebra [facility])   • Liver contusion   • Fall   • Acute pain due to trauma   • Right shoulder pain   • Severe protein-calorie malnutrition ([facility])   • Bipolar depression ([place] [address] )   • Rheumatoid arthritis ([place] [address] )   • 'light-for-dates' infant with signs of fetal malnutrition   • LAILA (acute kidney injury) ([place] [address] )   • Polypharmacy   • Cardiomyopathy ([place] [address] )   • Vitamin D insufficiency   • Stroke ([place] [address] )   • HTN (hypertension)   • HFrEF (heart failure with reduced ejection fraction) ([facility])   • Hypercalcemia   • Discoloration of skin of foot   • Abdominal pain   • Acute CVA (cerebrovascular accident) ([place] [address] )   • History of bacteremia   • Obstructive sleep apnea   • History of stroke   • UTI (urinary tract infection)   • AMS (altered mental status)   • Gout of foot   • Dysphagia   • Wound of left ankle   • Hypokalemia   • Sinus tachycardia   • Hypomagnesemia   • Insomnia       Objective     Vital Signs:     Blood pressure 118/74 Heart Rate: 97 Respiratory Rate 20   Temperature 97 6F Oxygen Saturation 96% Weight 138lb    Physical Exam  Vitals and nursing note reviewed  Constitutional:       General: She is not in acute distress  Appearance: Normal appearance  She is not ill-appearing or diaphoretic  HENT:      Head: Normocephalic and atraumatic  Nose: Nose normal  No congestion or rhinorrhea  Mouth/Throat:      Mouth: Mucous membranes are dry  Eyes:      General:         Right eye: No discharge  Left eye: No discharge        Pupils: Pupils are equal, round, and reactive to light  Cardiovascular:      Rate and Rhythm: Regular rhythm  Tachycardia present  Heart sounds: Normal heart sounds  No murmur heard  Pulmonary:      Effort: Pulmonary effort is normal  No respiratory distress  Breath sounds: Normal breath sounds  No wheezing  Abdominal:      General: There is no distension  Palpations: Abdomen is soft  There is no mass  Tenderness: There is no abdominal tenderness  There is no guarding or rebound  Musculoskeletal:      Cervical back: Normal range of motion and neck supple  Right lower leg: No edema  Left lower leg: No edema  Skin:     General: Skin is warm and dry  Coloration: Skin is pale  Findings: Bruising present  Comments: Wound on left ankle   Neurological:      Mental Status: She is alert and oriented to person, place, and time  Cranial Nerves: Cranial nerve deficit present  Motor: Weakness (left sided) present  Psychiatric:         Mood and Affect: Mood normal          Behavior: Behavior normal          Pertinent Laboratory/Diagnostic Studies:  Laboratory and Imaging studies reviewed  Full report in the paper chart  Current Medications   Medications reviewed and updated in facility chart      Name: Amanda Hackett  : 1955  MRN: [id number]        Elisa Boyer MD  Geriatric Medicine  2023 12:18 PM

## 2023-04-05 NOTE — ASSESSMENT & PLAN NOTE
Wound care evaluated patient's left ankle wound yesterday 4/3    - Continue to follow wound care instructions  - Follow up outpatient with wound care within one week of discharge

## 2023-04-05 NOTE — ASSESSMENT & PLAN NOTE
Patient continues to have trouble sleeping through the night    - Continue melatonin 3 mg QD at bedtime    - Start Trazodone 25 mg QD at bedtime    - Increase gabapentin to 300 mg at bedtime    - Encourage sleep hygiene   - Continue to monitor Subjective:  Patient presents today with complaints of a 4 week history of cough productive of sputum. She's had some ear popping and pain on and off as well. She's had some thick colored rhinorrhea. She denies any associated shortness of breath or chest pain. She sometimes wakes up with sore throat. Both of her children have been sick with similar symptoms as well, but not for as long.    Her second concern is that of some dry skin and itching on her right anterior shin. She's been using some triamcinolone cream on the area, but with little relief. She is also using an over-the-counter lotion.    I have reviewed the patient's medications and allergies, updating these as appropriate.  See EMR for a display of this information.      Objective:    Visit Vitals   • /80   • Pulse 103   • Temp 99.1 °F (37.3 °C) (Tympanic)   • Ht 5' (1.524 m)   • Wt 71.6 kg   • SpO2 99%   • BMI 30.84 kg/m2       This is a very pleasant 34-year-old white female is TMs are clear bilaterally and examination of the nares reveals hyperemia examination of the pharynx reveals strength. Neck is supple without any significant lymphadenopathy. Heart is regular rate and rhythm without murmur and lungs are auscultation. Examination of right anterior shin reveals several excoriated tiny papular lesions with some underlying dry skin. The area measures approximately 11 cm vertically and 5 cm horizontally.    Impression/Plan:    Thelma was seen today for cough and rash.    Diagnoses and all orders for this visit:    Acute sinusitis, recurrence not specified, unspecified location  -     amoxicillin-clavulanate (AUGMENTIN) 875-125 MG per tablet; Take 1 tablet by mouth 2 times daily.    Eczema, unspecified type  -     mometasone (ELOCON) 0.1 % cream; Apply 1 application topically daily.        Patient was placed on Augmentin 875 mg b.i.d. ×10 days. She's also given some Elocon cream to be applied daily as needed for the dry skin and rash. She has  been wearing tall boots recently and I suggested that she try wearing either regular shoes are shorter boots as I think some of this is secondary to that area and not being able to breathe. Her moods are made of a synthetic material. The friction also could be adding to the problem.

## 2023-04-05 NOTE — ASSESSMENT & PLAN NOTE
Continue protein supplement  Continue to monitor weight  Encourage increase in hydration  Repeat lab work tomorrow morning

## 2023-04-06 ENCOUNTER — HOME HEALTH ADMISSION (OUTPATIENT)
Dept: HOME HEALTH SERVICES | Facility: HOME HEALTHCARE | Age: 68
End: 2023-04-06
Payer: MEDICARE

## 2023-04-07 ENCOUNTER — NURSING HOME VISIT (OUTPATIENT)
Dept: GERIATRICS | Facility: OTHER | Age: 68
End: 2023-04-07

## 2023-04-07 DIAGNOSIS — I63.9 ACUTE CVA (CEREBROVASCULAR ACCIDENT) (HCC): Primary | ICD-10-CM

## 2023-04-07 DIAGNOSIS — G47.00 INSOMNIA, UNSPECIFIED TYPE: ICD-10-CM

## 2023-04-07 DIAGNOSIS — M47.816 LUMBAR SPONDYLOSIS: ICD-10-CM

## 2023-04-07 NOTE — ASSESSMENT & PLAN NOTE
Patient continues to have chronic lower back pain  - Continue applying lidocaine patch over area of pain as needed  - Continue to monitor patient's pain level

## 2023-04-07 NOTE — ASSESSMENT & PLAN NOTE
Neurological examination remains stable and unchanged  She continues to have left sided facial droop, left sided weakness, and difficulty ambulating    - Continue to work with OT and PT    - Continue Eliquis and statin and maintain good BP control    - Continue pureed diet with thickened liquids

## 2023-04-07 NOTE — PROGRESS NOTES
[address]  [phone]  [facility] of Service: nursing home place of service: POS [facility] Coverage      NAME: Amaris Dawson  AGE: 75 y o  SEX: female [id number]    DATE OF ENCOUNTER: 4/7/2023    Assessment and Plan     Problem List Items Addressed This Visit        Cardiovascular and Mediastinum    Acute CVA (cerebrovascular accident) [facility]) - Primary     Neurological examination remains stable and unchanged  She continues to have left sided facial droop, left sided weakness, and difficulty ambulating    - Continue to work with OT and PT    - Continue Eliquis and statin and maintain good BP control    - Continue pureed diet with thickened liquids  Musculoskeletal and Integument    Lumbar spondylosis     Patient continues to have chronic lower back pain  - Continue applying lidocaine patch over area of pain as needed  - Continue to monitor patient's pain level  Other    Insomnia     Patient reports that she slept well last night and was able to sleep throughout the whole night without waking    - Continue melatonin 3 mg QD at bedtime    - Continue trazodone 25 mg QD at bedtime    - Continue gabapentin 300 mg at bedtime    - Continue to encourage good sleep hygiene  - Continue to monitor  Chief Complaint     Follow up    History of Present Illness     Amaris Dawson is a 75 y o  female who was seen today for follow up  The patient was seen and examined at the bedside  She is doing well today  She states that she slept well last night and was able to sleep trough the whole night without waking  Her appetite is preserved and she is eating and drinking well  She had a normal consistency bowel movement this morning and is urinating without any difficulty  She denies any dysuria, hematuria, or foul-smelling urine   She continues to have chronic lower back pain but has no other acute complaints today  The following portions of the patient's history were reviewed and updated as appropriate: allergies, current medications, past family history, past medical history, past social history, past surgical history and problem list     Review of Systems     Review of Systems   Constitutional: Negative for appetite change, chills, diaphoresis, fatigue and fever  HENT: Positive for trouble swallowing  Negative for congestion and rhinorrhea  Eyes: Negative for visual disturbance  Respiratory: Negative for cough, chest tightness, shortness of breath and wheezing  Cardiovascular: Negative for chest pain, palpitations and leg swelling  Gastrointestinal: Negative for abdominal pain, blood in stool, constipation, diarrhea, nausea and vomiting  Endocrine: Negative for cold intolerance and heat intolerance  Genitourinary: Negative for difficulty urinating, dysuria, frequency, hematuria and urgency  Musculoskeletal: Positive for back pain (chronic) and gait problem  Negative for neck pain and neck stiffness  Skin: Positive for wound (left ankle)  Allergic/Immunologic: Negative for environmental allergies  Neurological: Positive for facial asymmetry, speech difficulty and weakness (left sided)  Negative for dizziness, seizures, light-headedness and headaches  Hematological: Does not bruise/bleed easily  Psychiatric/Behavioral: Negative for behavioral problems, confusion and sleep disturbance       Active Problem List     Patient Active Problem List   Diagnosis   • Chronic pain syndrome   • Asthma   • Ambulatory dysfunction   • Seronegative arthropathy of multiple sites ([place] [address] )   • Gastroesophageal reflux disease without esophagitis   • Mixed stress and urge urinary incontinence   • CHF (congestive heart failure) ([facility])   • Anemia   • Undifferentiated connective tissue disease ([facility] )   • Primary generalized (osteo)arthritis   • Peripheral neuropathy   • Osteoporosis   • Hx of compression fracture of spine   • Lumbar spondylosis   • Cervical spondylosis   • Closed fracture of multiple ribs of right side   • Traumatic pneumothorax   • Closed fracture of transverse process of lumbar vertebra [facility])   • Liver contusion   • Fall   • Acute pain due to trauma   • Right shoulder pain   • Severe protein-calorie malnutrition (HCC)   • Bipolar depression ([place] [address] )   • Rheumatoid arthritis ([place] [address] )   • 'light-for-dates' infant with signs of fetal malnutrition   • LAILA (acute kidney injury) ([place] [address] )   • Polypharmacy   • Cardiomyopathy ([place] [address] )   • Vitamin D insufficiency   • Stroke ([place] [address] )   • HTN (hypertension)   • HFrEF (heart failure with reduced ejection fraction) ([facility])   • Hypercalcemia   • Discoloration of skin of foot   • Abdominal pain   • Acute CVA (cerebrovascular accident) ([place] [address] )   • History of bacteremia   • Obstructive sleep apnea   • History of stroke   • UTI (urinary tract infection)   • AMS (altered mental status)   • Gout of foot   • Dysphagia   • Wound of left ankle   • Hypokalemia   • Sinus tachycardia   • Hypomagnesemia   • Insomnia       Objective     Vital Signs:     Blood pressure 115/65 Heart Rate: 112 Respiratory Rate 17   Temperature 97 2F Oxygen Saturation 98% Weight 138 lb     Physical Exam  Vitals and nursing note reviewed  Constitutional:       General: She is not in acute distress  Appearance: Normal appearance  She is not ill-appearing or diaphoretic  HENT:      Head: Normocephalic and atraumatic  Nose: Nose normal  No congestion or rhinorrhea  Mouth/Throat:      Mouth: Mucous membranes are dry  Eyes:      General:         Right eye: No discharge  Left eye: No discharge  Pupils: Pupils are equal, round, and reactive to light  Cardiovascular:      Rate and Rhythm: Normal rate and regular rhythm  Heart sounds: Normal heart sounds  No murmur heard  Pulmonary:      Effort: Pulmonary effort is normal  No respiratory distress        Breath sounds: Normal breath sounds  No wheezing  Abdominal:      General: Bowel sounds are normal  There is no distension  Palpations: Abdomen is soft  There is no mass  Tenderness: There is no abdominal tenderness  There is no guarding or rebound  Musculoskeletal:      Cervical back: Normal range of motion and neck supple  Right lower leg: No edema  Left lower leg: No edema  Skin:     General: Skin is warm and dry  Capillary Refill: Capillary refill takes less than 2 seconds  Coloration: Skin is pale  Findings: Bruising present  Comments: Wound on left ankle  Neurological:      Mental Status: She is alert and oriented to person, place, and time  Cranial Nerves: Cranial nerve deficit present  Motor: Weakness (left sided) present  Psychiatric:         Mood and Affect: Mood normal          Behavior: Behavior normal        Pertinent Laboratory/Diagnostic Studies:  Laboratory and Imaging studies reviewed  Full report in the paper chart  Current Medications   Medications reviewed and updated in facility chart      Name: Amaris Dawson  : 1955  MRN: [id number]      Latrice Turner MD  Geriatric Medicine  2023 1:02 PM

## 2023-04-07 NOTE — ASSESSMENT & PLAN NOTE
Patient reports that she slept well last night and was able to sleep throughout the whole night without waking    - Continue melatonin 3 mg QD at bedtime    - Continue trazodone 25 mg QD at bedtime    - Continue gabapentin 300 mg at bedtime    - Continue to encourage good sleep hygiene  - Continue to monitor

## 2023-04-11 PROCEDURE — 10330081 VN NO-PAY CLAIM PROCEDURE

## 2023-04-12 PROCEDURE — 10330064 DRESSING, HYDROCOLLOID THIN STR 4"X4" (1

## 2023-04-12 PROCEDURE — 10330064 SPONGE, GAUZE 8PLY N/S 4"X4" (200/PK 20P

## 2023-04-12 PROCEDURE — 10330064 SALINE, IRR SOL STR 100ML (48/CS) MGM37

## 2023-04-12 PROCEDURE — 10330064 CLEANSER, WND SEA-CLEANS 6OZ  COLPLT

## 2023-04-14 ENCOUNTER — OFFICE VISIT (OUTPATIENT)
Dept: NEUROLOGY | Facility: CLINIC | Age: 68
End: 2023-04-14

## 2023-04-14 VITALS
DIASTOLIC BLOOD PRESSURE: 50 MMHG | HEART RATE: 104 BPM | OXYGEN SATURATION: 94 % | TEMPERATURE: 98.2 F | RESPIRATION RATE: 20 BRPM | SYSTOLIC BLOOD PRESSURE: 89 MMHG | WEIGHT: 131.3 LBS | BODY MASS INDEX: 23.26 KG/M2

## 2023-04-14 DIAGNOSIS — M06.9 RHEUMATOID ARTHRITIS, INVOLVING UNSPECIFIED SITE, UNSPECIFIED WHETHER RHEUMATOID FACTOR PRESENT (HCC): ICD-10-CM

## 2023-04-14 DIAGNOSIS — M06.09 SERONEGATIVE ARTHROPATHY OF MULTIPLE SITES (HCC): ICD-10-CM

## 2023-04-14 DIAGNOSIS — G89.29 CHRONIC PAIN: ICD-10-CM

## 2023-04-14 DIAGNOSIS — G47.33 OBSTRUCTIVE SLEEP APNEA: ICD-10-CM

## 2023-04-14 DIAGNOSIS — Z86.73 HISTORY OF STROKE: Primary | ICD-10-CM

## 2023-04-14 DIAGNOSIS — I10 PRIMARY HYPERTENSION: ICD-10-CM

## 2023-04-14 DIAGNOSIS — E78.5 HYPERLIPIDEMIA: ICD-10-CM

## 2023-04-14 RX ORDER — CHLORHEXIDINE GLUCONATE 0.12 MG/ML
RINSE ORAL
COMMUNITY
Start: 2023-01-13 | End: 2023-04-29 | Stop reason: CLARIF

## 2023-04-14 RX ORDER — GABAPENTIN 100 MG/1
100 CAPSULE ORAL 2 TIMES DAILY
Qty: 30 CAPSULE | Refills: 2 | Status: SHIPPED | OUTPATIENT
Start: 2023-04-14 | End: 2023-05-15

## 2023-04-14 RX ORDER — GABAPENTIN 300 MG/1
300 CAPSULE ORAL 2 TIMES DAILY
Qty: 60 CAPSULE | Refills: 2 | Status: SHIPPED | OUTPATIENT
Start: 2023-04-14 | End: 2023-05-15

## 2023-04-14 NOTE — PROGRESS NOTES
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, floaters on right eye)  Negative for photophobia and pain  Respiratory: Negative  Negative for shortness of breath  Cardiovascular: Negative  Negative for palpitations  Gastrointestinal: Negative  Negative for nausea and vomiting  Endocrine: Negative  Negative for cold intolerance  Genitourinary: Negative  Negative for dysuria, frequency and urgency  Musculoskeletal: Negative  Negative for gait problem, myalgias and neck pain  Skin: Negative  Negative for rash  Allergic/Immunologic: Negative  Neurological: Positive for dizziness, weakness (Left side), numbness (feet) and headaches  Negative for tremors, seizures, syncope, facial asymmetry, speech difficulty and light-headedness  Hematological: Negative  Does not bruise/bleed easily  Psychiatric/Behavioral: Negative  Negative for confusion, hallucinations and sleep disturbance

## 2023-04-14 NOTE — PATIENT INSTRUCTIONS
History of right MCA and cerebellar stroke:    I had the pleasure of seeing Hang Campos today in the office at [place] [address] [facility] in [place]  She is presenting today as a hospital follow-up for her most recent right MCA stroke and also with her new cerebellar stroke as well  She had just gotten out of rehabilitation facilities recently  [facility] [address] for rehabilitation  She is still participating in physical therapy, Occupational Therapy, and speech therapy at this time  No new strokelike symptoms were reported at today's visit  Residual effects of the strokes the patient had that she is still dealing with are: Left-sided upper extremity and lower extremity weakness/numbness/neuropathy, dizziness/lightheadedness, speech difficulty, and swallowing difficulty     -One of the patient and her daughter's biggest concern is the cost of the Eliquis at this time  They are concerned because the Eliquis had caused time about $500 per month  I had suggested that maybe we could switch this to an alternative medication which would not cost as much as may be $500 a month  It appeared that Xarelto was around the same price as the Eliquis when looking on good Rx  However, it seems in some circumstances Pradaxa was significantly less compared to these other medications  My current recommendation at this time is to have the patient and her daughter talk to their primary care provider and cardiologist at the appointments next week, and see what they think about switching the anticoagulation medication to something that would be more sustainable for the patient from a cost-effective perspective  We would not want the patient to also have to go on to Coumadin as her final option as well we will try to avoid this as much as possible   -It seems as though there was confusion with the patient's gabapentin dosage    She was not receiving an additional 300 mg dose throughout the day  For the patient's worsening neuropathy, would just recommend adding on that additional 300 mg dosage another times throughout the day  This would mean the patient takes 400 mg in the morning of gabapentin and then 400 mg at night  If the patient is still having trouble with neuropathy in the future then I would recommend contacting me and then we can see if we can increase any of her medications at this time   -Would like for the patient to have a lipid panel before she comes back next visit  We just like to see how she is responding to the atorvastatin which she was currently placed on at the hospital   -Continue to follow with cardiology for heart failure at this time, along with her other cardiac comorbidities   -Follow with Dr Samina Musa and neurosurgery team in May as well, for follow-up appointment regarding patient's thrombectomy which was performed by Dr Samina Musa and also for the patient's aneurysm that was found on CTA as well  -If the patient becomes symptomatically hypotensive, experiencing lightheadedness, dizziness, fatigue, syncopal episodes, bradycardia, and etc  then I would recommend that the patient probably go to the ED and have an evaluation  If at any time the patient's blood pressure is consistently throughout the day below 90/60 for an extended period of time then I would recommend that she be seen if she is feeling the symptoms listed above of symptomatic hypotension  However, in the office today the patient's blood pressure was 89/50, although she does not feel symptomatic for hypotension at this time  Would just encourage the family to keep a close eye on this    Then, with her appointment with cardiology should discuss blood pressure medications and see if she even needs to be on something like metoprolol at this time   -Continue with the physical therapy, Occupational Therapy, and speech therapy at this time      - For ongoing stroke prevention continue: Eliquis 5 mg twice daily, atorvastatin 40 mg once daily  - Discussed the importance of antiplatelet management with the patient to prevent future strokes  - Recommend to check blood pressure occasionally away from the doctor's office to make sure that those numbers are typically less than 130/80  If they are frequently higher than that, we recommend checking a little more often and to follow up with primary care team   - Will defer to primary care team for monitoring of cholesterol panel and blood sugar numbers with target LDL cholesterol of less than 70 and hemoglobin A1c less than 7%  - Recommend following a low salt, mediterranean diet   - Recommend routine physical exercise as tolerated     We will plan for her to return to the office in 3 months time to see on of the APPs or Dr Cheek Fairly but would be happy to see her sooner if the need should arise  If she has any symptoms concerning for TIA or stroke including sudden painless loss of vision or double vision, difficulty speaking or swallowing, vertigo/room spinning that does not quickly resolve, or weakness/numbness/loss of coordination affecting 1 side of the face or body she should proceed by ambulance to the nearest emergency room immediately

## 2023-04-14 NOTE — PROGRESS NOTES
Patient ID: Laron Echeverria is a 75 y o  female who presents to the [facility]  Assessment/Plan:    History of right MCA and cerebellar stroke:    I had the pleasure of seeing Omega Samson today in the office at [place] [address] [facility] in [place]  She is presenting today as a hospital follow-up for her most recent right MCA stroke and also with her new cerebellar stroke as well  She had just gotten out of rehabilitation facilities recently  She was with shayne [facility], [facility], and [facility] for rehabilitation  She is still participating in physical therapy, Occupational Therapy, and speech therapy at this time  No new strokelike symptoms were reported at today's visit  Residual effects of the strokes the patient had that she is still dealing with are: Left-sided upper extremity and lower extremity weakness/numbness/neuropathy, dizziness/lightheadedness, speech difficulty, and swallowing difficulty     -One of the patient and her daughter's biggest concern is the cost of the Eliquis at this time  They are concerned because the Eliquis had caused time about $500 per month  I had suggested that maybe we could switch this to an alternative medication which would not cost as much as may be $500 a month  It appeared that Xarelto was around the same price as the Eliquis when looking on good Rx  However, it seems in some circumstances Pradaxa was significantly less compared to these other medications  My current recommendation at this time is to have the patient and her daughter talk to their primary care provider and cardiologist at the appointments next week, and see what they think about switching the anticoagulation medication to something that would be more sustainable for the patient from a cost-effective perspective  We would not want the patient to also have to go on to Coumadin as her final option as well we will try to avoid this as much as possible    -It seems as though there was confusion with the patient's gabapentin dosage  She was not receiving an additional 300 mg dose throughout the day  For the patient's worsening neuropathy, would just recommend adding on that additional 300 mg dosage another times throughout the day  This would mean the patient takes 400 mg in the morning of gabapentin and then 400 mg at night  If the patient is still having trouble with neuropathy in the future then I would recommend contacting me and then we can see if we can increase any of her medications at this time   -Would like for the patient to have a lipid panel before she comes back next visit  We just like to see how she is responding to the atorvastatin which she was currently placed on at the hospital   -Continue to follow with cardiology for heart failure at this time, along with her other cardiac comorbidities   -Follow with Dr David Rodarte and neurosurgery team in May as well, for follow-up appointment regarding patient's thrombectomy which was performed by Dr David Rodarte and also for the patient's aneurysm that was found on CTA as well  -If the patient becomes symptomatically hypotensive, experiencing lightheadedness, dizziness, fatigue, syncopal episodes, bradycardia, and etc  then I would recommend that the patient probably go to the ED and have an evaluation  If at any time the patient's blood pressure is consistently throughout the day below 90/60 for an extended period of time then I would recommend that she be seen if she is feeling the symptoms listed above of symptomatic hypotension  However, in the office today the patient's blood pressure was 89/50, although she does not feel symptomatic for hypotension at this time  Would just encourage the family to keep a close eye on this    Then, with her appointment with cardiology should discuss blood pressure medications and see if she even needs to be on something like metoprolol at this time   -Continue with the "physical therapy, Occupational Therapy, and speech therapy at this time      - For ongoing stroke prevention continue: Eliquis 5 mg twice daily, atorvastatin 40 mg once daily  - Discussed the importance of antiplatelet management with the patient to prevent future strokes  - Recommend to check blood pressure occasionally away from the doctor's office to make sure that those numbers are typically less than 130/80  If they are frequently higher than that, we recommend checking a little more often and to follow up with primary care team   - Will defer to primary care team for monitoring of cholesterol panel and blood sugar numbers with target LDL cholesterol of less than 70 and hemoglobin A1c less than 7%  - Recommend following a low salt, mediterranean diet   - Recommend routine physical exercise as tolerated     We will plan for her to return to the office in 3 months time to see on of the APPs or Dr Elizabeth Sierra but would be happy to see her sooner if the need should arise  If she has any symptoms concerning for TIA or stroke including sudden painless loss of vision or double vision, difficulty speaking or swallowing, vertigo/room spinning that does not quickly resolve, or weakness/numbness/loss of coordination affecting 1 side of the face or body she should proceed by ambulance to the nearest emergency room immediately  Subjective:    HPI    For Review/Hospital Course:    Camila Veras is a 75 y o  female who presents for follow up in regard to her recent stroke  \"73 y o  female with CHF, SHAR, nonischemic cardiomyopathy, bipolar disorder, chronic pain disorder, and seronegative arthropathy of multiple sites on Plaquenil      Presented to [facility] as stroke alert for right gaze preference, left-sided sensory deficits/weakness (face/arm/leg), dysarthria, and mild aphasia  NIHSS 13       CT head revealed acute infarct in the right parietotemporal region    CTA head/neck showing R M1 " "occlusion s/p mechanical thrombectomy TICI 3 revascularization  Patient was not a TNK candidate due to being outside the time window      • MRI brain revealed acute/subacute infarct in the right MCA territory with superimposed petechial hemorrhage and punctate acute/subactue infarct in the right cerebellum  • Echo: EF 25-30% with global hypokinesis, moderately dilated left atrium, normal right atrium  ? EF significantly reduced from Echo performed on 4/23/2022 (EF at that time was 65%)  • CT head 2/13 with evolving right MCA territory strokes with hyperdensity, likely representing a combination of resolving retained contrast and superimposed petechial hemorrhage  • LDL 84, Cholesterol 181, A1c 5 0     Etiology for M1 occlusion and infarcts in different vascular territories likely cardioembolic (possibly due to low EF of 25-30% vs undiagnosed paroxysmal A-fib given dilated left atrium)  Patient does report recent office visit with her cardiologist where she was informed that she had tachycardia, denying Afib  \" -per Osmany Reyes PA-C    From the hospital, patient was then discharged to the [address] [place] [address]  At this time, patient was stable enough to continue with Eliquis 5 mg twice daily and also atorvastatin 40 mg once daily  Patient was recommended to follow-up with neurosurgery and neurology on outpatient basis  Patient was also found to have nonischemic cardiomyopathy  Ejection fractions were as low as 10 to 15%  Patient's ejection fraction on last echocardiogram was 30%, patient declined to have a LifeVest at this time  Patient was recommended to continue to use beta-blocker medication while also adding on an ARB for blood pressure  Patient follows with Dr Glo Smith at [facility], however was considering switching to [address] cardiology      This was a(n) embolic stroke, most likely related to Cardioembolic: Other: Likely due to undiagnosed A-fib versus nonischemic cardiomyopathy with a " reduced ejection fraction to 20 to 25%    Residual symptoms include: facial assymetry, vertigo/balance or coordination issues, aphasia and dysphagia    Current treatment including stroke ppx:  Eliquis (apixiban)  and Appropriately dosed statin yes    Stroke risk factors were evaluated including: Heart failure with reduced ejection fraction, nonischemic cardiomyopathy, obstructive sleep apnea, hypertension, hyperlipidemia    Interval History:    Forgot many of the times she was in the ED due to the strokes  Left sided residual weakness, left facial droop, dizziness, speech difficulty, swallowing  Physical therapy and occupational therapy and speech therapy  Therapy has really helped her and has been making improvements currently  Fine motor skills still having issues at this time  Forgets when things are being held in her left hand from time to time, always thinks she picks something up but not always grabbing it  Was in rehab at the [facility], [facility] for rehab  Neuropathy is the only things not improving, only thing prior to the stroke not improving  Receiving gabapentin 300 mg in the morning and then gabapentin 100 mg twice daily  No additional gabapentin 300 mg at night time  Soft foods for swallowing at this point, nectar and thickening for right now  No new stroke-like symptoms since both strokes  BP 89/50 today in the office  This is indeed concerning at this time, due to the fact that pure hypertension is regarded as anything less than 90/60  However, patient does not feel symptomatic, does not feel lightheaded or dizzy or feeling faint  She does have heart failure with reduced ejection fraction, and is currently on metoprolol for the treatment of her blood pressure  Encourage the family to keep a close eye on the patient and if she is feeling symptomatically lightheaded/dizzy/syncopal, that they should take her to the ED    Just started Lipitor 40 mg while in the hospital   Most recent LDL was 84 mg/dL  Most recent hemoglobin A1c was 5 0%  Encouraged the patient the importance of following with cardiology, especially since the patient is currently being treated for heart failure with reduced ejection fraction  Encouraged patient to follow-up with Dr Juanjose Da Silva when she has her appointment in May  Sleeping much better at night, Oxygen at bedtime  Has obstructive sleep apnea but has not been wearing his CPAP  Has been feeling better with the melatonin she receives  Able to get her Ensure's down no problem  Patient is currently on duloxetine and has lorazepam as needed for treatment of her depression/anxiety      Lab Results   Component Value Date/Time    CHOLESTEROL 181 02/13/2023 05:12 AM     Lab Results   Component Value Date/Time    TRIG 81 02/13/2023 05:12 AM     Lab Results   Component Value Date/Time    HDL 81 02/13/2023 05:12 AM     Lab Results   Component Value Date/Time    LDLCALC 84 02/13/2023 05:12 AM       Lab Results   Component Value Date/Time    HGBA1C 5 0 02/13/2023 05:12 AM    HGBA1C 5 2 09/13/2022 12:59 PM     Lab Results   Component Value Date/Time    EAG 97 02/13/2023 05:12 AM     09/13/2022 12:59 PM           Past Medical History:   Diagnosis Date   • Anxiety    • Asthma    • Bipolar depression ([facility] )    • [facility]    • Chronic pain    • Depression    • Enterovirus heart infection    • SHAR (obstructive sleep apnea)    • Osteoarthritis    • Peripheral neuropathy    • Plantar fasciitis of right foot    • Senile osteoporosis    • Seronegative arthropathy of multiple sites (HCC)    • Undifferentiated connective tissue disease (HCC)        Current Outpatient Medications:   •  acetaminophen (TYLENOL) 325 mg tablet, Take 2 tablets (650 mg total) by mouth every 6 (six) hours as needed for mild pain, headaches or fever, Disp: , Rfl: 0  •  albuterol (PROVENTIL HFA,VENTOLIN HFA) 90 mcg/act inhaler, Inhale 2 puffs every 6 (six) hours as needed for wheezing, Disp: 18 g, Rfl: 0  •  aluminum-magnesium hydroxide-simethicone (MYLANTA) 4227-5374-772 mg/30 mL suspension, Take 30 mL by mouth every 4 (four) hours as needed for indigestion or heartburn, Disp: , Rfl: 0  •  apixaban (ELIQUIS) 5 mg, Take 1 tablet (5 mg total) by mouth 2 (two) times a day, Disp: 60 tablet, Rfl: 0  •  atorvastatin (LIPITOR) 40 mg tablet, Take 1 tablet (40 mg total) by mouth every evening, Disp: 30 tablet, Rfl: 0  •  buPROPion (WELLBUTRIN) 100 mg tablet, Take 100 mg by mouth 2 (two) times a day, Disp: , Rfl:   •  chlorhexidine (PERIDEX) 0 12 % solution, USE 1 TABLESPOONFUL IN THE MORNING AND EVENING AFTER MEALS, Disp: , Rfl:   •  cinacalcet (SENSIPAR) 30 mg tablet, Take 1 tablet (30 mg total) by mouth daily with breakfast, Disp: 30 tablet, Rfl: 0  •  DULOXETINE HCL PO, Take 60 mg by mouth 2 (two) times a day, Disp: , Rfl:   •  gabapentin (NEURONTIN) 100 mg capsule, Take 1 capsule (100 mg total) by mouth 2 (two) times a day, Disp: , Rfl: 0  •  gabapentin (NEURONTIN) 300 mg capsule, Take 1 capsule (300 mg total) by mouth 2 (two) times a day, Disp: 60 capsule, Rfl: 0  •  hydroxychloroquine (PLAQUENIL) 200 mg tablet, Take 1 tablet (200mg) every Mon, Tues, Wed, Thurs, and Fri   Take 2 tablets (400mg) every Sat and Sun , Disp: 40 tablet, Rfl: 2  •  lamoTRIgine (LaMICtal) 150 MG tablet, Take 1 tablet by mouth daily at bedtime pt taking 150mg daily at bedtime , Disp: , Rfl:   •  lidocaine (LIDODERM) 5 %, Apply 1 patch topically over 12 hours daily Remove & Discard patch within 12 hours or as directed by MD Do not start before March 25, 2023 , Disp: , Rfl: 0  •  LORazepam (ATIVAN) 0 5 mg tablet, Take 0 5 tablets (0 25 mg total) by mouth 2 (two) times a day as needed for anxiety, Disp: 20 tablet, Rfl: 0  •  magnesium oxide (MAG-OX) 400 mg tablet, Take 2 tablets (800 mg total) by mouth 2 (two) times a day, Disp: 120 tablet, Rfl: 0  •  melatonin 3 mg, Take 1 tablet (3 mg total) by mouth daily at bedtime, Disp: 30 tablet, Rfl: 0  •  metoprolol succinate (TOPROL-XL) 25 mg 24 hr tablet, Take 1 tablet (25 mg total) by mouth 2 (two) times a day, Disp: 60 tablet, Rfl: 0  •  morphine (MSIR) 15 mg tablet, Take 0 5 tablets (7 5 mg total) by mouth 2 (two) times a day as needed for severe pain or moderate pain Max Daily Amount: 15 mg, Disp: 30 tablet, Rfl: 0  •  ondansetron (ZOFRAN-ODT) 4 mg disintegrating tablet, Take 1 tablet (4 mg total) by mouth every 6 (six) hours as needed for nausea or vomiting, Disp: 20 tablet, Rfl: 0  •  pantoprazole (PROTONIX) 40 mg tablet, Take 1 tablet (40 mg total) by mouth daily in the early morning Do not start before March 25, 2023 , Disp: , Rfl: 0  •  potassium chloride 10% oral solution, Take 15 mL (20 mEq total) by mouth daily, Disp: 473 mL, Rfl: 0  •  prazosin (MINIPRESS) 1 mg capsule, Take 1 capsule (1 mg total) by mouth daily at bedtime, Disp: , Rfl: 0  •  senna-docusate sodium (SENOKOT S) 8 6-50 mg per tablet, Take 1 tablet by mouth 2 (two) times a day, Disp: 60 tablet, Rfl: 0  •  sodium chloride (OCEAN) 0 65 % nasal spray, 1 spray into each nostril every hour as needed for congestion, Disp: , Rfl: 0  •  white petrolatum-mineral oil (EUCERIN,HYDROCERIN) cream, Apply topically 3 (three) times a day And feet, Disp: , Rfl: 0  •  glycerin-hypromellose- (ARTIFICIAL TEARS) 0 2-0 2-1 % SOLN, Administer 1 drop to both eyes every 4 (four) hours as needed (dry eye) (Patient not taking: Reported on 4/14/2023), Disp: , Rfl: 0  •  menthol-methyl salicylate (BENGAY) 12-39 % cream, Apply topically 4 (four) times a day as needed (pain) (Patient not taking: Reported on 4/14/2023), Disp: , Rfl: 0  •  polyethylene glycol (MIRALAX) 17 g packet, Take 17 g by mouth daily as needed (Constipation) (Patient not taking: Reported on 4/14/2023), Disp: , Rfl:      Objective:    Physical Exam: Vitals:            Constitutional:    BP (!) 89/50 (BP Location: Right arm, Patient Position: Sitting, Cuff Size: Standard)   Pulse 104   Temp 98 2 °F (36 8 °C) (Temporal)   Resp 20   Wt 59 6 kg (131 lb 4 8 oz)   SpO2 94%   BMI 23 26 kg/m²   BP Readings from Last 3 Encounters:   05/10/23 114/72   05/10/23 114/72   05/08/23 118/62     Pulse Readings from Last 3 Encounters:   05/12/23 76   05/10/23 90   05/10/23 90         Well developed, well nourished, well groomed  No dysmorphic features  Psychiatric:  Normal behavior and appropriate affect        Neurological Examination:     Mental status/cognitive function:   Orientated to time, place and person  Recent and remote memory intact  Attention span and concentration as well as fund of knowledge are appropriate for age  Normal language and slight aphasia noted    Cranial Nerves:  II-visual fields full  III, IV, VI-Pupils were equal, round, and reactive to light and accomodation  Extraocular movements were full and conjugate without nystagmus  Conjugate gaze, normal smooth pursuits, normal saccades   V-facial sensation symmetric  VII-facial expression symmetric, intact forehead wrinkle, strong eye closure, symmetric smile    VIII-hearing grossly intact bilaterally   IX, X-palate elevation symmetric, no dysarthria  XI-shoulder shrug strength intact    XII-tongue protrusion midline  Motor Exam: symmetric bulk and tone throughout, no pronator drift  Power/strength 5/5 right upper and lower extremity, power/strength 4/5 of the left upper and lower extremity  Sensory: Intact light touch sensation throughout  Reflexes: brachioradialis 2+, biceps 2+, knee 2+ bilaterally  Coordination: Finger nose finger intact bilaterally, left sided dysmetria    ROS:    Review of Systems   Constitutional: Negative  Negative for appetite change and fever  HENT: Negative  Negative for hearing loss, tinnitus, trouble swallowing and voice change      Eyes: Positive for visual disturbance (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, floaters on right eye)  Negative for photophobia and pain  Respiratory: Negative  Negative for shortness of breath  Cardiovascular: Negative  Negative for palpitations  Gastrointestinal: Negative  Negative for nausea and vomiting  Endocrine: Negative  Negative for cold intolerance  Genitourinary: Negative  Negative for dysuria, frequency and urgency  Musculoskeletal: Negative  Negative for gait problem, myalgias and neck pain  Skin: Negative  Negative for rash  Allergic/Immunologic: Negative  Neurological: Positive for dizziness, weakness (Left side), numbness (feet) and headaches  Negative for tremors, seizures, syncope, facial asymmetry, speech difficulty and light-headedness  Hematological: Negative  Does not bruise/bleed easily  Psychiatric/Behavioral: Negative  Negative for confusion, hallucinations and sleep disturbance           I have spent 60 minutes today on this case including chart review, performing history and exam, patient counseling, and documentation/communication          Angi Markham PA-C  04/14/2023

## 2023-04-18 ENCOUNTER — HOME CARE VISIT (OUTPATIENT)
Dept: HOME HEALTH SERVICES | Facility: HOME HEALTHCARE | Age: 68
End: 2023-04-18

## 2023-04-19 VITALS
TEMPERATURE: 96.8 F | OXYGEN SATURATION: 98 % | DIASTOLIC BLOOD PRESSURE: 78 MMHG | SYSTOLIC BLOOD PRESSURE: 112 MMHG | HEART RATE: 90 BPM | RESPIRATION RATE: 24 BRPM

## 2023-04-20 PROCEDURE — 10330064

## 2023-04-20 PROCEDURE — 10330064 CLEANSER, WND SALINE WASH STR 7.1OZ (12/

## 2023-04-20 PROCEDURE — 10330064 SPONGE, GAUZE 8PLY N/S 4"X4" (200/PK 20P

## 2023-04-21 ENCOUNTER — HOME CARE VISIT (OUTPATIENT)
Dept: HOME HEALTH SERVICES | Facility: HOME HEALTHCARE | Age: 68
End: 2023-04-21

## 2023-04-21 DIAGNOSIS — G47.00 INSOMNIA, UNSPECIFIED TYPE: ICD-10-CM

## 2023-04-23 VITALS
OXYGEN SATURATION: 98 % | SYSTOLIC BLOOD PRESSURE: 110 MMHG | DIASTOLIC BLOOD PRESSURE: 74 MMHG | HEART RATE: 80 BPM | TEMPERATURE: 97.4 F | RESPIRATION RATE: 18 BRPM

## 2023-04-24 ENCOUNTER — HOME CARE VISIT (OUTPATIENT)
Dept: HOME HEALTH SERVICES | Facility: HOME HEALTHCARE | Age: 68
End: 2023-04-24

## 2023-04-24 VITALS — OXYGEN SATURATION: 99 % | DIASTOLIC BLOOD PRESSURE: 77 MMHG | HEART RATE: 88 BPM | SYSTOLIC BLOOD PRESSURE: 114 MMHG

## 2023-04-25 ENCOUNTER — HOME CARE VISIT (OUTPATIENT)
Dept: HOME HEALTH SERVICES | Facility: HOME HEALTHCARE | Age: 68
End: 2023-04-25

## 2023-04-25 ENCOUNTER — TELEPHONE (OUTPATIENT)
Dept: CARDIOLOGY CLINIC | Facility: CLINIC | Age: 68
End: 2023-04-25

## 2023-04-25 VITALS
HEART RATE: 88 BPM | DIASTOLIC BLOOD PRESSURE: 76 MMHG | SYSTOLIC BLOOD PRESSURE: 106 MMHG | TEMPERATURE: 97.1 F | RESPIRATION RATE: 20 BRPM

## 2023-04-25 NOTE — TELEPHONE ENCOUNTER
Mey,is the daughter called in- she is asking to change Eliquis -to Pradaxa -co-pay is $100 00    [phone] is her number

## 2023-04-25 NOTE — TELEPHONE ENCOUNTER
OP CARD SEEMA Camacho provided information that pt will not qualify for Eliquis $10 co-pay coupon bc pt has Medicare  Pt may qualify for 30 Day Free trial of Eliquis only if she never used that coupon before  Coupon can be located at [employer]  OP Advanced Heart Failure LCSW placed f/u call to pt's dtr- Ladarius Browning  Explained the above info  LCSW encouraged dtr to call pt's AARP and ask details about prescription coverage and benefits  Provided info about [facility] for complimentary assistance in comparing insurance plans that best meet prescription needs for patient  Dtr, Ladarius Browning was very thankful for this information  She also compared cost of Pradaxa on the [employer] discount card and said it was $100 versus $500 for Eliquis  Dtr, Ladarius Browning asked if Pradaxa would be appropriate for patient  Encouraged dtr, Ladarius Browning to call [phone] and ask to speak with nurse about this  Encouraged dtr to call this LCSW as needed for resources

## 2023-04-26 ENCOUNTER — HOME CARE VISIT (OUTPATIENT)
Dept: HOME HEALTH SERVICES | Facility: HOME HEALTHCARE | Age: 68
End: 2023-04-26

## 2023-04-26 VITALS — HEART RATE: 85 BPM | DIASTOLIC BLOOD PRESSURE: 55 MMHG | OXYGEN SATURATION: 94 % | SYSTOLIC BLOOD PRESSURE: 93 MMHG

## 2023-04-26 VITALS — HEART RATE: 82 BPM | OXYGEN SATURATION: 98 %

## 2023-04-27 ENCOUNTER — HOME CARE VISIT (OUTPATIENT)
Dept: HOME HEALTH SERVICES | Facility: HOME HEALTHCARE | Age: 68
End: 2023-04-27

## 2023-04-28 ENCOUNTER — HOME CARE VISIT (OUTPATIENT)
Dept: HOME HEALTH SERVICES | Facility: HOME HEALTHCARE | Age: 68
End: 2023-04-28

## 2023-04-28 VITALS — OXYGEN SATURATION: 99 % | HEART RATE: 78 BPM

## 2023-04-29 ENCOUNTER — HOME CARE VISIT (OUTPATIENT)
Dept: HOME HEALTH SERVICES | Facility: HOME HEALTHCARE | Age: 68
End: 2023-04-29

## 2023-04-29 ENCOUNTER — APPOINTMENT (EMERGENCY)
Dept: RADIOLOGY | Facility: HOSPITAL | Age: 68
End: 2023-04-29

## 2023-04-29 ENCOUNTER — HOSPITAL ENCOUNTER (INPATIENT)
Facility: HOSPITAL | Age: 68
LOS: 2 days | Discharge: HOME HEALTH | End: 2023-05-01
Attending: EMERGENCY MEDICINE | Admitting: INTERNAL MEDICINE

## 2023-04-29 VITALS
SYSTOLIC BLOOD PRESSURE: 96 MMHG | DIASTOLIC BLOOD PRESSURE: 68 MMHG | OXYGEN SATURATION: 94 % | TEMPERATURE: 96.9 F | RESPIRATION RATE: 18 BRPM | HEART RATE: 80 BPM

## 2023-04-29 DIAGNOSIS — S02.2XXA NASAL BONE FRACTURES: ICD-10-CM

## 2023-04-29 DIAGNOSIS — N39.0 URINARY TRACT INFECTION WITHOUT HEMATURIA, SITE UNSPECIFIED: ICD-10-CM

## 2023-04-29 DIAGNOSIS — M06.9 RHEUMATOID ARTHRITIS (HCC): ICD-10-CM

## 2023-04-29 DIAGNOSIS — I63.9 CEREBROVASCULAR ACCIDENT (CVA), UNSPECIFIED MECHANISM (HCC): Primary | ICD-10-CM

## 2023-04-29 DIAGNOSIS — I10 PRIMARY HYPERTENSION: ICD-10-CM

## 2023-04-29 PROBLEM — R29.90 STROKE-LIKE SYMPTOMS: Status: ACTIVE | Noted: 2023-04-29

## 2023-04-29 LAB
ANION GAP SERPL CALCULATED.3IONS-SCNC: 0 MMOL/L (ref 4–13)
APTT PPP: 35 SECONDS (ref 23–37)
BUN SERPL-MCNC: 12 MG/DL (ref 5–25)
CALCIUM SERPL-MCNC: 8.4 MG/DL (ref 8.3–10.1)
CARDIAC TROPONIN I PNL SERPL HS: 5 NG/L
CHLORIDE SERPL-SCNC: 108 MMOL/L (ref 96–108)
CO2 SERPL-SCNC: 31 MMOL/L (ref 21–32)
CREAT SERPL-MCNC: 1.32 MG/DL (ref 0.6–1.3)
ERYTHROCYTE [DISTWIDTH] IN BLOOD BY AUTOMATED COUNT: 13 % (ref 11.6–15.1)
GFR SERPL CREATININE-BSD FRML MDRD: 41 ML/MIN/1.73SQ M
GLUCOSE SERPL-MCNC: 126 MG/DL (ref 65–140)
GLUCOSE SERPL-MCNC: 91 MG/DL (ref 65–140)
HCT VFR BLD AUTO: 34.1 % (ref 34.8–46.1)
HGB BLD-MCNC: 11.3 G/DL (ref 11.5–15.4)
INR PPP: 1.66 (ref 0.84–1.19)
MCH RBC QN AUTO: 32 PG (ref 26.8–34.3)
MCHC RBC AUTO-ENTMCNC: 33.1 G/DL (ref 31.4–37.4)
MCV RBC AUTO: 97 FL (ref 82–98)
PLATELET # BLD AUTO: 249 THOUSANDS/UL (ref 149–390)
PMV BLD AUTO: 10.9 FL (ref 8.9–12.7)
POTASSIUM SERPL-SCNC: 3.8 MMOL/L (ref 3.5–5.3)
PROTHROMBIN TIME: 19.9 SECONDS (ref 11.6–14.5)
RBC # BLD AUTO: 3.53 MILLION/UL (ref 3.81–5.12)
SODIUM SERPL-SCNC: 139 MMOL/L (ref 135–147)
WBC # BLD AUTO: 5.59 THOUSAND/UL (ref 4.31–10.16)

## 2023-04-29 RX ORDER — ONDANSETRON 2 MG/ML
4 INJECTION INTRAMUSCULAR; INTRAVENOUS EVERY 6 HOURS PRN
Status: DISCONTINUED | OUTPATIENT
Start: 2023-04-29 | End: 2023-05-01 | Stop reason: HOSPADM

## 2023-04-29 RX ORDER — BUPROPION HYDROCHLORIDE 100 MG/1
100 TABLET ORAL 2 TIMES DAILY
Status: DISCONTINUED | OUTPATIENT
Start: 2023-04-29 | End: 2023-05-01 | Stop reason: HOSPADM

## 2023-04-29 RX ORDER — ATORVASTATIN CALCIUM 40 MG/1
40 TABLET, FILM COATED ORAL EVERY EVENING
Status: DISCONTINUED | OUTPATIENT
Start: 2023-04-29 | End: 2023-05-01

## 2023-04-29 RX ORDER — LANOLIN ALCOHOL/MO/W.PET/CERES
3 CREAM (GRAM) TOPICAL
Status: DISCONTINUED | OUTPATIENT
Start: 2023-04-29 | End: 2023-05-01 | Stop reason: HOSPADM

## 2023-04-29 RX ORDER — SPIRONOLACTONE 25 MG/1
25 TABLET ORAL DAILY
Status: DISCONTINUED | OUTPATIENT
Start: 2023-04-30 | End: 2023-05-01 | Stop reason: HOSPADM

## 2023-04-29 RX ORDER — LIDOCAINE 50 MG/G
1 PATCH TOPICAL DAILY
Status: DISCONTINUED | OUTPATIENT
Start: 2023-04-30 | End: 2023-05-01 | Stop reason: HOSPADM

## 2023-04-29 RX ORDER — LANOLIN ALCOHOL/MO/W.PET/CERES
800 CREAM (GRAM) TOPICAL 2 TIMES DAILY
Status: DISCONTINUED | OUTPATIENT
Start: 2023-04-29 | End: 2023-05-01 | Stop reason: HOSPADM

## 2023-04-29 RX ORDER — ACETAMINOPHEN 325 MG/1
650 TABLET ORAL EVERY 6 HOURS PRN
Status: DISCONTINUED | OUTPATIENT
Start: 2023-04-29 | End: 2023-05-01 | Stop reason: HOSPADM

## 2023-04-29 RX ORDER — ATORVASTATIN CALCIUM 40 MG/1
40 TABLET, FILM COATED ORAL EVERY EVENING
Status: DISCONTINUED | OUTPATIENT
Start: 2023-04-29 | End: 2023-04-29

## 2023-04-29 RX ORDER — CINACALCET 30 MG/1
30 TABLET, FILM COATED ORAL
Status: DISCONTINUED | OUTPATIENT
Start: 2023-04-30 | End: 2023-05-01 | Stop reason: HOSPADM

## 2023-04-29 RX ORDER — GABAPENTIN 100 MG/1
100 CAPSULE ORAL 2 TIMES DAILY
Status: DISCONTINUED | OUTPATIENT
Start: 2023-04-29 | End: 2023-05-01 | Stop reason: HOSPADM

## 2023-04-29 RX ORDER — ASPIRIN 81 MG/1
81 TABLET, CHEWABLE ORAL DAILY
Status: DISCONTINUED | OUTPATIENT
Start: 2023-04-30 | End: 2023-05-01

## 2023-04-29 RX ORDER — POLYETHYLENE GLYCOL 3350 17 G/17G
17 POWDER, FOR SOLUTION ORAL DAILY PRN
Status: DISCONTINUED | OUTPATIENT
Start: 2023-04-29 | End: 2023-05-01 | Stop reason: HOSPADM

## 2023-04-29 RX ORDER — LORAZEPAM 0.5 MG/1
0.25 TABLET ORAL 2 TIMES DAILY PRN
Status: DISCONTINUED | OUTPATIENT
Start: 2023-04-29 | End: 2023-04-30

## 2023-04-29 RX ORDER — AMOXICILLIN 250 MG
1 CAPSULE ORAL 2 TIMES DAILY
Status: DISCONTINUED | OUTPATIENT
Start: 2023-04-29 | End: 2023-05-01 | Stop reason: HOSPADM

## 2023-04-29 RX ORDER — GABAPENTIN 300 MG/1
300 CAPSULE ORAL 2 TIMES DAILY
Status: DISCONTINUED | OUTPATIENT
Start: 2023-04-29 | End: 2023-05-01 | Stop reason: HOSPADM

## 2023-04-29 RX ORDER — MORPHINE SULFATE 15 MG/1
7.5 TABLET ORAL 2 TIMES DAILY PRN
Status: DISCONTINUED | OUTPATIENT
Start: 2023-04-29 | End: 2023-05-01 | Stop reason: HOSPADM

## 2023-04-29 RX ORDER — PRAZOSIN HYDROCHLORIDE 1 MG/1
1 CAPSULE ORAL
Status: DISCONTINUED | OUTPATIENT
Start: 2023-04-29 | End: 2023-05-01 | Stop reason: HOSPADM

## 2023-04-29 RX ORDER — HYDROXYCHLOROQUINE SULFATE 200 MG/1
200 TABLET, FILM COATED ORAL 2 TIMES DAILY WITH MEALS
Status: DISCONTINUED | OUTPATIENT
Start: 2023-04-30 | End: 2023-05-01 | Stop reason: HOSPADM

## 2023-04-29 RX ORDER — PANTOPRAZOLE SODIUM 40 MG/1
40 TABLET, DELAYED RELEASE ORAL
Status: DISCONTINUED | OUTPATIENT
Start: 2023-04-30 | End: 2023-05-01 | Stop reason: HOSPADM

## 2023-04-29 RX ORDER — MAGNESIUM OXIDE 400 MG/1
800 TABLET ORAL 2 TIMES DAILY
Status: DISCONTINUED | OUTPATIENT
Start: 2023-04-29 | End: 2023-04-29

## 2023-04-29 RX ORDER — DULOXETIN HYDROCHLORIDE 60 MG/1
60 CAPSULE, DELAYED RELEASE ORAL 2 TIMES DAILY
Status: DISCONTINUED | OUTPATIENT
Start: 2023-04-29 | End: 2023-05-01 | Stop reason: HOSPADM

## 2023-04-29 RX ORDER — ALBUTEROL SULFATE 90 UG/1
2 AEROSOL, METERED RESPIRATORY (INHALATION) EVERY 6 HOURS PRN
Status: DISCONTINUED | OUTPATIENT
Start: 2023-04-29 | End: 2023-05-01 | Stop reason: HOSPADM

## 2023-04-29 RX ORDER — MAGNESIUM HYDROXIDE/ALUMINUM HYDROXICE/SIMETHICONE 120; 1200; 1200 MG/30ML; MG/30ML; MG/30ML
30 SUSPENSION ORAL EVERY 6 HOURS PRN
Status: DISCONTINUED | OUTPATIENT
Start: 2023-04-29 | End: 2023-05-01 | Stop reason: HOSPADM

## 2023-04-29 RX ADMIN — GABAPENTIN 100 MG: 100 CAPSULE ORAL at 23:36

## 2023-04-29 RX ADMIN — APIXABAN 5 MG: 5 TABLET, FILM COATED ORAL at 23:38

## 2023-04-29 RX ADMIN — MAGNESIUM OXIDE TAB 400 MG (241.3 MG ELEMENTAL MG) 800 MG: 400 (241.3 MG) TAB at 23:37

## 2023-04-29 RX ADMIN — ATORVASTATIN CALCIUM 40 MG: 40 TABLET, FILM COATED ORAL at 23:37

## 2023-04-29 RX ADMIN — LAMOTRIGINE 150 MG: 100 TABLET ORAL at 23:37

## 2023-04-29 RX ADMIN — BUPROPION HYDROCHLORIDE TABLETS 100 MG: 100 TABLET, FILM COATED ORAL at 23:47

## 2023-04-29 RX ADMIN — MELATONIN 3 MG: at 23:37

## 2023-04-29 RX ADMIN — GABAPENTIN 300 MG: 300 CAPSULE ORAL at 23:35

## 2023-04-29 RX ADMIN — DULOXETINE HYDROCHLORIDE 60 MG: 60 CAPSULE, DELAYED RELEASE ORAL at 23:35

## 2023-04-30 ENCOUNTER — HOME CARE VISIT (OUTPATIENT)
Dept: HOME HEALTH SERVICES | Facility: HOME HEALTHCARE | Age: 68
End: 2023-04-30

## 2023-04-30 ENCOUNTER — APPOINTMENT (INPATIENT)
Dept: RADIOLOGY | Facility: HOSPITAL | Age: 68
End: 2023-04-30

## 2023-04-30 LAB
ALBUMIN SERPL BCP-MCNC: 2.6 G/DL (ref 3.5–5)
ALP SERPL-CCNC: 52 U/L (ref 46–116)
ALT SERPL W P-5'-P-CCNC: 14 U/L (ref 12–78)
ANION GAP SERPL CALCULATED.3IONS-SCNC: 3 MMOL/L (ref 4–13)
AST SERPL W P-5'-P-CCNC: 17 U/L (ref 5–45)
ATRIAL RATE: 79 BPM
BASOPHILS # BLD AUTO: 0.03 THOUSANDS/ΜL (ref 0–0.1)
BASOPHILS NFR BLD AUTO: 1 % (ref 0–1)
BILIRUB SERPL-MCNC: 0.54 MG/DL (ref 0.2–1)
BUN SERPL-MCNC: 9 MG/DL (ref 5–25)
CALCIUM ALBUM COR SERPL-MCNC: 9.5 MG/DL (ref 8.3–10.1)
CALCIUM SERPL-MCNC: 8.4 MG/DL (ref 8.3–10.1)
CHLORIDE SERPL-SCNC: 112 MMOL/L (ref 96–108)
CHOLEST SERPL-MCNC: 97 MG/DL
CO2 SERPL-SCNC: 27 MMOL/L (ref 21–32)
CREAT SERPL-MCNC: 1.04 MG/DL (ref 0.6–1.3)
EOSINOPHIL # BLD AUTO: 0 THOUSAND/ΜL (ref 0–0.61)
EOSINOPHIL NFR BLD AUTO: 0 % (ref 0–6)
ERYTHROCYTE [DISTWIDTH] IN BLOOD BY AUTOMATED COUNT: 13 % (ref 11.6–15.1)
GFR SERPL CREATININE-BSD FRML MDRD: 55 ML/MIN/1.73SQ M
GLUCOSE SERPL-MCNC: 79 MG/DL (ref 65–140)
HCT VFR BLD AUTO: 32.9 % (ref 34.8–46.1)
HDLC SERPL-MCNC: 68 MG/DL
HGB BLD-MCNC: 10.7 G/DL (ref 11.5–15.4)
IMM GRANULOCYTES # BLD AUTO: 0.02 THOUSAND/UL (ref 0–0.2)
IMM GRANULOCYTES NFR BLD AUTO: 0 % (ref 0–2)
LDLC SERPL CALC-MCNC: 17 MG/DL (ref 0–100)
LYMPHOCYTES # BLD AUTO: 1.35 THOUSANDS/ΜL (ref 0.6–4.47)
LYMPHOCYTES NFR BLD AUTO: 30 % (ref 14–44)
MAGNESIUM SERPL-MCNC: 2.5 MG/DL (ref 1.6–2.6)
MCH RBC QN AUTO: 31.5 PG (ref 26.8–34.3)
MCHC RBC AUTO-ENTMCNC: 32.5 G/DL (ref 31.4–37.4)
MCV RBC AUTO: 97 FL (ref 82–98)
MONOCYTES # BLD AUTO: 0.49 THOUSAND/ΜL (ref 0.17–1.22)
MONOCYTES NFR BLD AUTO: 11 % (ref 4–12)
NEUTROPHILS # BLD AUTO: 2.67 THOUSANDS/ΜL (ref 1.85–7.62)
NEUTS SEG NFR BLD AUTO: 58 % (ref 43–75)
NRBC BLD AUTO-RTO: 0 /100 WBCS
P AXIS: 30 DEGREES
PLATELET # BLD AUTO: 225 THOUSANDS/UL (ref 149–390)
PMV BLD AUTO: 11.1 FL (ref 8.9–12.7)
POTASSIUM SERPL-SCNC: 3.3 MMOL/L (ref 3.5–5.3)
PR INTERVAL: 200 MS
PROT SERPL-MCNC: 5.7 G/DL (ref 6.4–8.4)
QRS AXIS: -18 DEGREES
QRSD INTERVAL: 98 MS
QT INTERVAL: 350 MS
QTC INTERVAL: 401 MS
RBC # BLD AUTO: 3.4 MILLION/UL (ref 3.81–5.12)
SODIUM SERPL-SCNC: 142 MMOL/L (ref 135–147)
T WAVE AXIS: 116 DEGREES
TRIGL SERPL-MCNC: 59 MG/DL
TSH SERPL DL<=0.05 MIU/L-ACNC: 1.36 UIU/ML (ref 0.45–4.5)
VENTRICULAR RATE: 79 BPM
WBC # BLD AUTO: 4.56 THOUSAND/UL (ref 4.31–10.16)

## 2023-04-30 RX ORDER — POTASSIUM CHLORIDE 20 MEQ/1
40 TABLET, EXTENDED RELEASE ORAL ONCE
Status: COMPLETED | OUTPATIENT
Start: 2023-04-30 | End: 2023-04-30

## 2023-04-30 RX ORDER — LORAZEPAM 0.5 MG/1
0.5 TABLET ORAL ONCE AS NEEDED
Status: DISCONTINUED | OUTPATIENT
Start: 2023-04-30 | End: 2023-05-01 | Stop reason: HOSPADM

## 2023-04-30 RX ORDER — LORAZEPAM 2 MG/ML
1 INJECTION INTRAMUSCULAR ONCE AS NEEDED
Status: DISCONTINUED | OUTPATIENT
Start: 2023-04-30 | End: 2023-04-30

## 2023-04-30 RX ORDER — LORAZEPAM 2 MG/ML
1 INJECTION INTRAMUSCULAR ONCE
Status: COMPLETED | OUTPATIENT
Start: 2023-04-30 | End: 2023-04-30

## 2023-04-30 RX ORDER — MAGNESIUM SULFATE HEPTAHYDRATE 40 MG/ML
2 INJECTION, SOLUTION INTRAVENOUS ONCE
Status: COMPLETED | OUTPATIENT
Start: 2023-04-30 | End: 2023-04-30

## 2023-04-30 RX ORDER — METOCLOPRAMIDE HYDROCHLORIDE 5 MG/ML
10 INJECTION INTRAMUSCULAR; INTRAVENOUS EVERY 6 HOURS PRN
Status: DISCONTINUED | OUTPATIENT
Start: 2023-04-30 | End: 2023-05-01 | Stop reason: HOSPADM

## 2023-04-30 RX ADMIN — HYDROXYCHLOROQUINE SULFATE 200 MG: 200 TABLET ORAL at 14:08

## 2023-04-30 RX ADMIN — DULOXETINE HYDROCHLORIDE 60 MG: 60 CAPSULE, DELAYED RELEASE ORAL at 22:14

## 2023-04-30 RX ADMIN — MAGNESIUM SULFATE HEPTAHYDRATE 2 G: 40 INJECTION, SOLUTION INTRAVENOUS at 18:21

## 2023-04-30 RX ADMIN — GABAPENTIN 300 MG: 300 CAPSULE ORAL at 14:11

## 2023-04-30 RX ADMIN — CINACALCET 30 MG: 30 TABLET, FILM COATED ORAL at 14:10

## 2023-04-30 RX ADMIN — BUPROPION HYDROCHLORIDE TABLETS 100 MG: 100 TABLET, FILM COATED ORAL at 14:09

## 2023-04-30 RX ADMIN — ASPIRIN 81 MG 81 MG: 81 TABLET ORAL at 14:10

## 2023-04-30 RX ADMIN — LAMOTRIGINE 150 MG: 100 TABLET ORAL at 22:14

## 2023-04-30 RX ADMIN — LIDOCAINE 1 PATCH: 50 PATCH CUTANEOUS at 10:01

## 2023-04-30 RX ADMIN — GABAPENTIN 300 MG: 300 CAPSULE ORAL at 18:59

## 2023-04-30 RX ADMIN — ATORVASTATIN CALCIUM 40 MG: 40 TABLET, FILM COATED ORAL at 18:59

## 2023-04-30 RX ADMIN — APIXABAN 5 MG: 5 TABLET, FILM COATED ORAL at 14:12

## 2023-04-30 RX ADMIN — BUPROPION HYDROCHLORIDE TABLETS 100 MG: 100 TABLET, FILM COATED ORAL at 19:01

## 2023-04-30 RX ADMIN — METOCLOPRAMIDE HYDROCHLORIDE 10 MG: 5 INJECTION INTRAMUSCULAR; INTRAVENOUS at 12:50

## 2023-04-30 RX ADMIN — MAGNESIUM OXIDE TAB 400 MG (241.3 MG ELEMENTAL MG) 800 MG: 400 (241.3 MG) TAB at 19:03

## 2023-04-30 RX ADMIN — MELATONIN 3 MG: at 22:14

## 2023-04-30 RX ADMIN — POTASSIUM CHLORIDE 40 MEQ: 1500 TABLET, EXTENDED RELEASE ORAL at 18:21

## 2023-04-30 RX ADMIN — GABAPENTIN 100 MG: 100 CAPSULE ORAL at 19:03

## 2023-04-30 RX ADMIN — DULOXETINE HYDROCHLORIDE 60 MG: 60 CAPSULE, DELAYED RELEASE ORAL at 14:10

## 2023-04-30 RX ADMIN — MAGNESIUM OXIDE TAB 400 MG (241.3 MG ELEMENTAL MG) 800 MG: 400 (241.3 MG) TAB at 14:08

## 2023-04-30 RX ADMIN — PRAZOSIN HYDROCHLORIDE 1 MG: 1 CAPSULE ORAL at 22:15

## 2023-04-30 RX ADMIN — METOPROLOL TARTRATE 25 MG: 25 TABLET, FILM COATED ORAL at 22:14

## 2023-04-30 RX ADMIN — METOPROLOL TARTRATE 25 MG: 25 TABLET, FILM COATED ORAL at 14:09

## 2023-04-30 RX ADMIN — PANTOPRAZOLE SODIUM 40 MG: 40 TABLET, DELAYED RELEASE ORAL at 05:10

## 2023-04-30 RX ADMIN — ONDANSETRON 4 MG: 2 INJECTION INTRAMUSCULAR; INTRAVENOUS at 09:55

## 2023-04-30 RX ADMIN — APIXABAN 5 MG: 5 TABLET, FILM COATED ORAL at 18:59

## 2023-04-30 RX ADMIN — LORAZEPAM 1 MG: 2 INJECTION INTRAMUSCULAR; INTRAVENOUS at 15:24

## 2023-04-30 RX ADMIN — GABAPENTIN 100 MG: 100 CAPSULE ORAL at 14:11

## 2023-04-30 NOTE — PLAN OF CARE
Problem: MOBILITY - ADULT  Goal: Maintain or return to baseline ADL function  Description: INTERVENTIONS:  -  Assess patient's ability to carry out ADLs; assess patient's baseline for ADL function and identify physical deficits which impact ability to perform ADLs (bathing, care of mouth/teeth, toileting, grooming, dressing, etc )  - Assess/evaluate cause of self-care deficits   - Assess range of motion  - Assess patient's mobility; develop plan if impaired  - Assess patient's need for assistive devices and provide as appropriate  - Encourage maximum independence but intervene and supervise when necessary  - Involve family in performance of ADLs  - Assess for home care needs following discharge   - Consider OT consult to assist with ADL evaluation and planning for discharge  - Provide patient education as appropriate  Outcome: Progressing  Goal: Maintains/Returns to pre admission functional level  Description: INTERVENTIONS:  - Perform BMAT or MOVE assessment daily    - Set and communicate daily mobility goal to care team and patient/family/caregiver  - Collaborate with rehabilitation services on mobility goals if consulted  - Perform Range of Motion 2 times a day  - Reposition patient every 2 hours    - Dangle patient 2 times a day  - Stand patient 2 times a day  - Ambulate patient 3 times a day  - Out of bed to chair 3 times a day   - Out of bed for meals 3 times a day  - Out of bed for toileting  - Record patient progress and toleration of activity level   Outcome: Progressing     Problem: Prexisting or High Potential for Compromised Skin Integrity  Goal: Skin integrity is maintained or improved  Description: INTERVENTIONS:  - Identify patients at risk for skin breakdown  - Assess and monitor skin integrity  - Assess and monitor nutrition and hydration status  - Monitor labs   - Assess for incontinence   - Turn and reposition patient  - Assist with mobility/ambulation  - Relieve pressure over bony prominences  - Avoid friction and shearing  - Provide appropriate hygiene as needed including keeping skin clean and dry  - Evaluate need for skin moisturizer/barrier cream  - Collaborate with interdisciplinary team   - Patient/family teaching  - Consider wound care consult   Outcome: Progressing

## 2023-04-30 NOTE — ASSESSMENT & PLAN NOTE
· Presented after son noted worsening left-sided facial droop  Note recent admission for CVA with residual L sided weakness     · Consider recrudescence of prior stroke symptoms possibly secondary to hypotension as patient noted her BP was low at home   · Appreciate neurology consult and recommendations   · Repeat MRI ordered and pending   · Continue home Eliquis   · Hold on ASA and statin   · Continue neurochecks  · Therapy has cleared for home health rehabilitation   · Awaiting formal speech therapy evaluation

## 2023-04-30 NOTE — ED PROVIDER NOTES
History  Chief Complaint   Patient presents with   [address]     Patient presents with increased slurred speech and facial droop according to family  Patient states no change  68-year-old female with a past medical history of ischemic stroke and hemorrhagic stroke on 2/12/2023  Patient is on Eliquis and has been taking it  Patient admits she had a fall this morning she felt unsteady in the bathroom  She denies head strike  She normally ambulates with a walker  Family is concerned that she has worsening left facial droop and left-sided weaknesses  She also complains of headache behind her right eye and left arm pain  Was made a stroke alert by EMS  She was evaluated and brought to CT scan  Prior to Admission Medications   Prescriptions Last Dose Informant Patient Reported? Taking?    DULOXETINE HCL PO Unknown  Yes No   Sig: Take 60 mg by mouth 2 (two) times a day   LORazepam (ATIVAN) 0 5 mg tablet Unknown  No No   Sig: Take 0 5 tablets (0 25 mg total) by mouth 2 (two) times a day as needed for anxiety   albuterol (PROVENTIL HFA,VENTOLIN HFA) 90 mcg/act inhaler Unknown  No No   Sig: Inhale 2 puffs every 6 (six) hours as needed for wheezing   apixaban (ELIQUIS) 5 mg Unknown  No No   Sig: Take 1 tablet (5 mg total) by mouth 2 (two) times a day   atorvastatin (LIPITOR) 40 mg tablet Unknown  No No   Sig: Take 1 tablet (40 mg total) by mouth every evening   buPROPion (WELLBUTRIN) 100 mg tablet Unknown  Yes No   Sig: Take 100 mg by mouth 2 (two) times a day   cinacalcet (SENSIPAR) 30 mg tablet Unknown  No No   Sig: Take 1 tablet (30 mg total) by mouth daily with breakfast   gabapentin (NEURONTIN) 100 mg capsule Unknown  No No   Sig: Take 1 capsule (100 mg total) by mouth 2 (two) times a day   gabapentin (NEURONTIN) 300 mg capsule Unknown  No No   Sig: Take 1 capsule (300 mg total) by mouth 2 (two) times a day   hydroxychloroquine (PLAQUENIL) 200 mg tablet Unknown  No No   Sig: Take 1 tablet (200mg) every Mon, Tues, Wed, Thurs, and Fri  Take 2 tablets (400mg) every Sat and Sun  Patient taking differently: 200 mg 2 (two) times a day with meals   lamoTRIgine (LaMICtal) 150 MG tablet Unknown  Yes No   Sig: Take 1 tablet by mouth daily at bedtime pt taking 150mg daily at bedtime    lidocaine (LIDODERM) 5 % Unknown  No No   Sig: Apply 1 patch topically over 12 hours daily Remove & Discard patch within 12 hours or as directed by MD Do not start before March 25, 2023  magnesium oxide (MAG-OX) 400 mg tablet Unknown  No No   Sig: Take 2 tablets (800 mg total) by mouth 2 (two) times a day   melatonin 3 mg Unknown  No No   Sig: Take 1 tablet (3 mg total) by mouth daily at bedtime   metoprolol tartrate (LOPRESSOR) 25 mg tablet Unknown  Yes No   Sig: Take 25 mg by mouth every 12 (twelve) hours   morphine (MSIR) 15 mg tablet Unknown  No No   Sig: Take 0 5 tablets (7 5 mg total) by mouth 2 (two) times a day as needed for severe pain or moderate pain Max Daily Amount: 15 mg   pantoprazole (PROTONIX) 40 mg tablet Unknown  No No   Sig: Take 1 tablet (40 mg total) by mouth daily in the early morning Do not start before March 25, 2023     polyethylene glycol (MIRALAX) 17 g packet Unknown  No No   Sig: Take 17 g by mouth daily as needed (Constipation)   Patient not taking: Reported on 4/14/2023   prazosin (MINIPRESS) 1 mg capsule Unknown  No No   Sig: Take 1 capsule (1 mg total) by mouth daily at bedtime   senna-docusate sodium (SENOKOT S) 8 6-50 mg per tablet Unknown  No No   Sig: Take 1 tablet by mouth 2 (two) times a day   spironolactone (ALDACTONE) 25 mg tablet Unknown  No No   Sig: Take 1 tablet (25 mg total) by mouth daily      Facility-Administered Medications: None       Past Medical History:   Diagnosis Date    Anxiety     Asthma     Bipolar depression (HCC)     Chronic narcotic dependence ([facility] [address] )     Chronic pain     Depression     Enterovirus heart infection     SHAR (obstructive sleep apnea)     Osteoarthritis  Peripheral neuropathy     Plantar fasciitis of right foot     Senile osteoporosis     Seronegative arthropathy of multiple sites ([facility] [address] )     Undifferentiated connective tissue disease ([facility] )        Past Surgical History:   Procedure Laterality Date    BREAST IMPLANT      FIXATION KYPHOPLASTY LUMBAR SPINE      HYSTERECTOMY      INCONTINENCE SURGERY N/A     IR STROKE ALERT  2/12/2023    NASAL SEPTOPLASTY W/ TURBINOPLASTY N/A     RECTAL PROLAPSE REPAIR N/A     REPAIR RECTOCELE      RHINOPLASTY N/A        Family History   Problem Relation Age of Onset    No Known Problems Mother     No Known Problems Father     Arthritis Family     Diabetes Family     Stroke Family      I have reviewed and agree with the history as documented  E-Cigarette/Vaping    E-Cigarette Use Never User      E-Cigarette/Vaping Substances    Nicotine No     THC No     CBD No     Flavoring No     Other No     Unknown No      Social History     Tobacco Use    Smoking status: Former    Smokeless tobacco: Never   Vaping Use    Vaping Use: Never used   Substance Use Topics    Alcohol use: Not Currently     Comment: quit    Drug use: Not Currently     Types: Marijuana     Comment: has medical card        Review of Systems   Constitutional: Negative for chills and fever  HENT: Negative for sore throat  Eyes: Negative for visual disturbance  Respiratory: Negative for cough and shortness of breath  Cardiovascular: Negative for chest pain and palpitations  Gastrointestinal: Negative for abdominal pain, nausea and vomiting  Genitourinary: Negative for dysuria and hematuria  Musculoskeletal: Negative for arthralgias and back pain  Skin: Negative for color change and rash  Neurological: Positive for weakness and headaches  Negative for seizures and syncope  All other systems reviewed and are negative        Physical Exam  ED Triage Vitals   Temperature Pulse Respirations Blood Pressure SpO2   04/29/23 2026 04/29/23 2015 04/29/23 2015 04/29/23 2015 04/29/23 2015   97 6 °F (36 4 °C) 96 20 132/60 98 %      Temp Source Heart Rate Source Patient Position - Orthostatic VS BP Location FiO2 (%)   04/29/23 2026 04/29/23 2015 -- -- --   Oral Monitor         Pain Score       --                    Orthostatic Vital Signs  Vitals:    04/29/23 2115 04/29/23 2130 04/29/23 2145 04/29/23 2321   BP: 105/61 99/61 107/67 112/64   Pulse: 76 78 76 78       Physical Exam  Vitals and nursing note reviewed  Constitutional:       General: She is not in acute distress  Appearance: She is well-developed  HENT:      Head: Normocephalic and atraumatic  Eyes:      Conjunctiva/sclera: Conjunctivae normal    Cardiovascular:      Rate and Rhythm: Normal rate and regular rhythm  Heart sounds: No murmur heard  Pulmonary:      Effort: Pulmonary effort is normal  No respiratory distress  Breath sounds: Normal breath sounds  Abdominal:      Palpations: Abdomen is soft  Tenderness: There is no abdominal tenderness  Musculoskeletal:         General: No swelling  Cervical back: Neck supple  Skin:     General: Skin is warm and dry  Capillary Refill: Capillary refill takes less than 2 seconds  Neurological:      Mental Status: She is alert and oriented to person, place, and time  GCS: GCS eye subscore is 4  GCS verbal subscore is 5  GCS motor subscore is 6  Cranial Nerves: Dysarthria present  Comments: GCS 15  Patient has dysarthria at baseline  Patient is resting left-sided facial droop  But when asked to smile facial droop resolved  No visual field deficits  She has 4/5 muscle strength in the right extremities and 3/5 muscle strength in the left extremities  Finger-to-nose testing within normal limits     Psychiatric:         Mood and Affect: Mood normal          ED Medications  Medications   albuterol (PROVENTIL HFA,VENTOLIN HFA) inhaler 2 puff (has no administration in time range)   apixaban [facility]) tablet 5 mg (5 mg Oral Given 4/29/23 2338)   atorvastatin (LIPITOR) tablet 40 mg (40 mg Oral Given 4/29/23 2337)   buPROPion [facility]) tablet 100 mg (has no administration in time range)   cinacalcet (SENSIPAR) tablet 30 mg (has no administration in time range)   DULoxetine (CYMBALTA) delayed release capsule 60 mg (60 mg Oral Given 4/29/23 2335)   gabapentin (NEURONTIN) capsule 100 mg (100 mg Oral Given 4/29/23 2336)   gabapentin (NEURONTIN) capsule 300 mg (300 mg Oral Given 4/29/23 2335)   hydroxychloroquine (PLAQUENIL) tablet 200 mg (has no administration in time range)   lamoTRIgine (LaMICtal) tablet 150 mg (150 mg Oral Given 4/29/23 2337)   lidocaine (LIDODERM) 5 % patch 1 patch (has no administration in time range)   LORazepam (ATIVAN) tablet 0 25 mg (has no administration in time range)   melatonin tablet 3 mg (3 mg Oral Given 4/29/23 2337)   metoprolol tartrate (LOPRESSOR) tablet 25 mg (0 mg Oral Hold 4/29/23 2339)   morphine (MSIR) IR tablet 7 5 mg (has no administration in time range)   pantoprazole (PROTONIX) EC tablet 40 mg (has no administration in time range)   polyethylene glycol (MIRALAX) packet 17 g (has no administration in time range)   prazosin (MINIPRESS) capsule 1 mg (0 mg Oral Hold 4/29/23 2339)   senna-docusate sodium (SENOKOT S) 8 6-50 mg per tablet 1 tablet (1 tablet Oral Refused 4/29/23 2322)   spironolactone (ALDACTONE) tablet 25 mg (has no administration in time range)   acetaminophen (TYLENOL) tablet 650 mg (has no administration in time range)   ondansetron (ZOFRAN) injection 4 mg (has no administration in time range)   aluminum-magnesium hydroxide-simethicone (MYLANTA) oral suspension 30 mL (has no administration in time range)   aspirin chewable tablet 81 mg (has no administration in time range)   magnesium Oxide (MAG-OX) tablet 800 mg (800 mg Oral Given 4/29/23 2337)       Diagnostic Studies  Results Reviewed     Procedure Component Value Units Date/Time    UA (URINE) with reflex to Scope [[id number]]     Lab Status: No result Specimen: Urine     HS Troponin 0hr (reflex protocol) [[id number]]  (Normal) Collected: 04/29/23 2044    Lab Status: Final result Specimen: Blood from Arm, Left Updated: 04/29/23 2120     hs TnI 0hr 5 ng/L     Protime-INR [[id number]]  (Abnormal) Collected: 04/29/23 2044    Lab Status: Final result Specimen: Blood from Arm, Left Updated: 04/29/23 2117     Protime 19 9 seconds      INR 1 66    APTT [[id number]]  (Normal) Collected: 04/29/23 2044    Lab Status: Final result Specimen: Blood from Arm, Left Updated: 04/29/23 2117     PTT 35 seconds     Basic metabolic panel [[id number]]  (Abnormal) Collected: 04/29/23 2044    Lab Status: Final result Specimen: Blood from Arm, Left Updated: 04/29/23 2111     Sodium 139 mmol/L      Potassium 3 8 mmol/L      Chloride 108 mmol/L      CO2 31 mmol/L      ANION GAP 0 mmol/L      BUN 12 mg/dL      Creatinine 1 32 mg/dL      Glucose 91 mg/dL      Calcium 8 4 mg/dL      eGFR 41 ml/min/1 73sq m     Narrative:      [place] guidelines for Chronic Kidney Disease (CKD):     Stage 1 with normal or high GFR (GFR > 90 mL/min/1 73 square meters)    Stage 2 Mild CKD (GFR = 60-89 mL/min/1 73 square meters)    Stage 3A Moderate CKD (GFR = 45-59 mL/min/1 73 square meters)    Stage 3B Moderate CKD (GFR = 30-44 mL/min/1 73 square meters)    Stage 4 Severe CKD (GFR = 15-29 mL/min/1 73 square meters)    Stage 5 End Stage CKD (GFR <15 mL/min/1 73 square meters)  Note: GFR calculation is accurate only with a steady state creatinine    CBC and Platelet [[id number]]  (Abnormal) Collected: 04/29/23 2044    Lab Status: Final result Specimen: Blood from Arm, Left Updated: 04/29/23 2103     WBC 5 59 Thousand/uL      RBC 3 53 Million/uL      Hemoglobin 11 3 g/dL      Hematocrit 34 1 %      MCV 97 fL      MCH 32 0 pg      MCHC 33 1 g/dL      RDW 13 0 %      Platelets 155 Thousands/uL      MPV 10 9 fL     Fingerstick Glucose (POCT) [[id number]]  (Normal) Collected: 04/29/23 2023    Lab Status: Final result Updated: 04/29/23 2026     POC Glucose 126 mg/dl                  CT stroke alert brain   Final Result by Leonarda Holm MD (04/29 2040)      Old right MCA distribution infarcts  No evidence of acute intracranial hemorrhage  Chronic microangiopathic changes  Displaced right nasal fractures and nondisplaced left nasal fracture  I personally discussed this study with Dr Sebastián Stokes on 4/29/2023 8:24 PM             Workstation performed: [id number]         CT facial bones without contrast    (Results Pending)   MRI Inpatient Order    (Results Pending)         Procedures  Procedures      ED Course  ED Course as of 04/29/23 2347   Sat Apr 29, 2023 2044 CT stroke alert brain  IMPRESSION:     Old right MCA distribution infarcts  No evidence of acute intracranial hemorrhage   Chronic microangiopathic changes      Displaced right nasal fractures and nondisplaced left nasal fracture            I personally discussed this study with Dr Sebastián Stokes on 4/29/2023 8:24 PM            Workstation performed: [id number]                  Stroke Assessment     Row Name 04/29/23 2114             NIH Stroke Scale    Interval --      Level of Consciousness (1a ) 0      LOC Questions (1b ) 0      LOC Commands (1c ) 0      Best Gaze (2 ) 0      Visual (3 ) 0      Facial Palsy (4 ) 0      Motor Arm, Left (5a ) 0      Motor Arm, Right (5b ) 0      Motor Leg, Left (6a ) 0      Motor Leg, Right (6b ) 0      Limb Ataxia (7 ) 0      Sensory (8 ) 0      Best Language (9 ) 0      Dysarthria (10 ) 1      Extinction and Inattention (11 ) (Formerly Neglect) 0      Total 1                                    Medical Decision Making  79-year-old female with past medical history of stroke presents with worsening of left-sided deficits    DDx: Stroke, cerebral hypoperfusion, hypoglycemia, arrhythmia    Plan: Stroke alert, BMP, CBC, troponin, EKG, chest x-ray, admission    Work-up is not concerning for new stroke at this time  Blood glucose within normal limits  Electrolytes were stable  EKG is not concerning for dysrhythmia, or ischemia  Old facial bone fractures were seen on CT head, CT facial bones were ordered  Patient was admitted to medicine with neurology consultation over concern of TIA  Amount and/or Complexity of Data Reviewed  Labs: ordered  Radiology: ordered  Decision-making details documented in ED Course  Risk  Decision regarding hospitalization  Disposition  Final diagnoses:   Cerebrovascular accident (CVA), unspecified mechanism ([place] [address] )     Time reflects when diagnosis was documented in both MDM as applicable and the Disposition within this note     Time User Action Codes Description Comment    4/29/2023  7:58 PM Peyton Lieberman Add [I63 9] Cerebrovascular accident (CVA), unspecified mechanism [facility])       ED Disposition     ED Disposition   Admit    Condition   Stable    Date/Time   Sat Apr 29, 2023  9:57 PM    Comment   Case was discussed with Dr Joshua Goodrich and the patient's admission status was agreed to be Admission Status: inpatient status to the service of Dr Joshua Goodrich   Follow-up Information    None         Current Discharge Medication List      CONTINUE these medications which have NOT CHANGED    Details   albuterol (PROVENTIL HFA,VENTOLIN HFA) 90 mcg/act inhaler Inhale 2 puffs every 6 (six) hours as needed for wheezing  Qty: 18 g, Refills: 0    Comments: Substitution to a formulary equivalent within the same pharmaceutical class is authorized    Associated Diagnoses: Obstructive sleep apnea      apixaban (ELIQUIS) 5 mg Take 1 tablet (5 mg total) by mouth 2 (two) times a day  Qty: 60 tablet, Refills: 0    Associated Diagnoses: Acute CVA (cerebrovascular accident) ([facility] )      atorvastatin (LIPITOR) 40 mg tablet Take 1 tablet (40 mg total) by mouth every evening  Qty: 30 tablet, Refills: 0    Associated Diagnoses: Stroke ([place] [address] )      buPROPion (WELLBUTRIN) 100 mg tablet Take 100 mg by mouth 2 (two) times a day      cinacalcet (SENSIPAR) 30 mg tablet Take 1 tablet (30 mg total) by mouth daily with breakfast  Qty: 30 tablet, Refills: 0    Associated Diagnoses: Hypercalcemia      DULOXETINE HCL PO Take 60 mg by mouth 2 (two) times a day      !! gabapentin (NEURONTIN) 100 mg capsule Take 1 capsule (100 mg total) by mouth 2 (two) times a day  Qty: 30 capsule, Refills: 2    Associated Diagnoses: Rheumatoid arthritis, involving unspecified site, unspecified whether rheumatoid factor present ([place] [address] ); Seronegative arthropathy of multiple sites [facility])      ! ! gabapentin (NEURONTIN) 300 mg capsule Take 1 capsule (300 mg total) by mouth 2 (two) times a day  Qty: 60 capsule, Refills: 2    Associated Diagnoses: Chronic pain      hydroxychloroquine (PLAQUENIL) 200 mg tablet Take 1 tablet (200mg) every Mon, Tues, Wed, Thurs, and Fri  Take 2 tablets (400mg) every Sat and Sun  Qty: 40 tablet, Refills: 2    Associated Diagnoses: Rheumatoid arthritis (HCC)      lamoTRIgine (LaMICtal) 150 MG tablet Take 1 tablet by mouth daily at bedtime pt taking 150mg daily at bedtime       lidocaine (LIDODERM) 5 % Apply 1 patch topically over 12 hours daily Remove & Discard patch within 12 hours or as directed by MD Do not start before March 25, 2023  Refills: 0    Associated Diagnoses: Rheumatoid arthritis, involving unspecified site, unspecified whether rheumatoid factor present ([place] [address] );  Chronic pain syndrome; Seronegative arthropathy of multiple sites (HCC)      LORazepam (ATIVAN) 0 5 mg tablet Take 0 5 tablets (0 25 mg total) by mouth 2 (two) times a day as needed for anxiety  Qty: 20 tablet, Refills: 0    Associated Diagnoses: Anxiety      magnesium oxide (MAG-OX) 400 mg tablet Take 2 tablets (800 mg total) by mouth 2 (two) times a day  Qty: 120 tablet, Refills: 0    Associated Diagnoses: Hypomagnesemia      melatonin 3 mg Take 1 tablet (3 mg total) by mouth daily at bedtime  Qty: 30 tablet, Refills: 0    Associated Diagnoses: Insomnia, unspecified type      metoprolol tartrate (LOPRESSOR) 25 mg tablet Take 25 mg by mouth every 12 (twelve) hours      morphine (MSIR) 15 mg tablet Take 0 5 tablets (7 5 mg total) by mouth 2 (two) times a day as needed for severe pain or moderate pain Max Daily Amount: 15 mg  Qty: 30 tablet, Refills: 0    Associated Diagnoses: Rheumatoid arthritis, involving unspecified site, unspecified whether rheumatoid factor present ([facility] ); Chronic pain syndrome; Seronegative arthropathy of multiple sites (HCC)      pantoprazole (PROTONIX) 40 mg tablet Take 1 tablet (40 mg total) by mouth daily in the early morning Do not start before March 25, 2023  Refills: 0    Associated Diagnoses: Esophageal dysphagia      polyethylene glycol (MIRALAX) 17 g packet Take 17 g by mouth daily as needed (Constipation)    Associated Diagnoses: Acute pain due to trauma; Closed fracture of multiple ribs of right side, initial encounter      prazosin (MINIPRESS) 1 mg capsule Take 1 capsule (1 mg total) by mouth daily at bedtime  Refills: 0    Associated Diagnoses: Nightmares      senna-docusate sodium (SENOKOT S) 8 6-50 mg per tablet Take 1 tablet by mouth 2 (two) times a day  Qty: 60 tablet, Refills: 0    Associated Diagnoses: Acute pain due to trauma; Closed fracture of multiple ribs of right side, initial encounter      spironolactone (ALDACTONE) 25 mg tablet Take 1 tablet (25 mg total) by mouth daily  Qty: 30 tablet, Refills: 5    Associated Diagnoses: Chronic congestive heart failure, unspecified heart failure type ([facility] )       ! ! - Potential duplicate medications found  Please discuss with provider  No discharge procedures on file  PDMP Review       Value Time User    PDMP Reviewed  Yes 2/22/2023  4:00 PM Adis Yeung PA-C           ED Provider  Attending physically available and evaluated Chacorta Estrada   I managed the patient along with the ED Attending      Electronically Signed by         Noel Shah DO  04/29/23 3408

## 2023-04-30 NOTE — ASSESSMENT & PLAN NOTE
· Continue Wellbutrin 100 mg twice daily, Cymbalta 60 mg twice daily, Ativan 0 25 mg twice daily as needed

## 2023-04-30 NOTE — ASSESSMENT & PLAN NOTE
75 y o  female w h/o recent right MCA stroke status post thrombectomy on 2/12/23 on Eliquis, anxiety, bipolar disorder, chronic pain syndrome, RA, HF  who presents at [facility] ED on 4/29/23 as a stroke alert on 4/29/2023  8:11 PM with initial NIHSS of 2 and LKW 7:10 PM about an hour ago , initial Blood Pressure: 132/60  Initial presenting deficits were worsening facial droop, headache, right arm pain   As a result of pt on eliquis and not disabling symptoms pt was determined to not be a candidate for thrombolysis (TNK)   Exam on 04/29/23:    Current Blood Pressure: 107/67, BP over 24 hours: BP  Min: 99/61  Max: 134/70    Vascular risk factors: previous stroke    Home meds: Eliquis     Workup:  Lab Results   Component Value Date    HGBA1C 5 0 02/13/2023    CHOLESTEROL 181 02/13/2023    LDLCALC 84 02/13/2023    TRIG 81 02/13/2023    INR 1 66 (H) 04/29/2023       CTH:  old right MCA distribution infarcts  No evidence of acute intracranial hemorrhage  Chronic microangiopathic changes  Displaced right nasal fractures and nondisplaced left nasal fracture  C   CTA: deferred as patient is allergic to the dye   MRI: deferred     Pertinent scores:  - NIHSS: 2  Stroke Modified [place] Score: 4 (Moderately severe disability; unable to walk and attend to bodily needs without assistance)    Impression: Low suspicion for neurological issue  Pt reports burning smell while having dinner and pain in right eye region which could be related to right displaced fracture  Low suspicion for stroke at this time  Plan:  · Defer stroke pathway or MRI   · Continue Eliquis 5 mg BID   · Rest of the plan and supportive care per primary service

## 2023-04-30 NOTE — CASE COMMUNICATION
Review  of  EPIC  indicates  pateint  is  at  [facility]  with DX  of stroke  like symptoms    Home  P T   on  hold

## 2023-04-30 NOTE — OCCUPATIONAL THERAPY NOTE
Occupational Therapy Evaluation     Patient Name: Riley Tyler  AWOVV'F Date: 4/30/2023  Problem List  Active Problems:    Mild intermittent asthma without complication    Bipolar depression ([facility] )    Rheumatoid arthritis ([place] [address] )    HTN (hypertension)    Acute CVA (cerebrovascular accident) ([place] [address] )    Stroke-like symptoms    Past Medical History  Past Medical History:   Diagnosis Date    Anxiety     Asthma     Bipolar depression (HCC)     Chronic narcotic dependence ([place] [address] )     Chronic pain     Depression     Enterovirus heart infection     SHAR (obstructive sleep apnea)     Osteoarthritis     Peripheral neuropathy     Plantar fasciitis of right foot     Senile osteoporosis     Seronegative arthropathy of multiple sites ([place] [address] )     Undifferentiated connective tissue disease ([place] [address] )      Past Surgical History  Past Surgical History:   Procedure Laterality Date    BREAST IMPLANT      FIXATION KYPHOPLASTY LUMBAR SPINE      HYSTERECTOMY      INCONTINENCE SURGERY N/A     IR STROKE ALERT  2/12/2023    NASAL SEPTOPLASTY W/ TURBINOPLASTY N/A     RECTAL PROLAPSE REPAIR N/A     REPAIR RECTOCELE      RHINOPLASTY N/A            04/30/23 0857   OT Last Visit   OT Visit Date 04/30/23   Note Type   Note type Evaluation   Pain Assessment   Pain Assessment Tool 0-10   Pain Score 5   Pain Location/Orientation Orientation: Lower; Location: Back   Hospital Pain Intervention(s) Ambulation/increased activity;Repositioned   Restrictions/Precautions   Weight Bearing Precautions Per Order No   Other Precautions Chair Alarm; Bed Alarm;Telemetry; Fall Risk   Home Living   Type of [address] [place] [address] Two level;Bed/bath upstairs; Able to live on main level with bedroom/bathroom  (After Feb hospitalization, son converted downstairs space to a First Floor set up)   Bathroom Shower/Tub Walk-in shower   Bathroom Toilet Standard   Bathroom Equipment Grab bars in shower; Shower chair;Grab bars around toilet   [address] "Equipment Walker   Prior Function   Level of [place] Needs assistance with IADLS;Needs assistance with ADLs; Independent with functional mobility  (FM c RW)   Lives With Family;Son  (Lives with son and brother)   Receives Help From Family  (Dtr comes 3x/week to assist with bathing tasks)   IADLs Family/Friend/Other provides transportation; Family/Friend/Other provides meals; Family/Friend/Other provides medication management   Falls in the last 6 months 1 to 4  (2 falls, 1 loss of balance in bathroom)   Vocational Retired   Comments Pt reports receiving A from son daily to complete basic ADLs, dtr comes 3x/week for bathing  Pt reports reciving home OT, PT and Speech  Lifestyle   Autonomy Pt recieves A from family for ADLs/IADLs  Pt I with FM c RW   -, son provides transportation as needed   Reciprocal Relationships Lives with son and brother, dtr comes regularly to help   Service to Others Retired   Intrinsic Gratification Being with family   Subjective   Subjective \"My son takes good care of me\"   ADL   Where Assessed Edge of bed   Eating Assistance 5  Supervision/Setup   Grooming Assistance 5  Supervision/Setup   UB Bathing Assistance 4  Minimal Assistance   LB Bathing Assistance 3  Moderate Assistance   UB Dressing Assistance 4  Minimal Assistance   LB Dressing Assistance 3  Moderate Assistance   Toileting Assistance  4  Minimal Assistance   Functional Assistance 4  Minimal Assistance   Bed Mobility   Additional Comments Pt found sitting EOB, left in bedside chair with alarm on and all needs within reach   Transfers   Sit to Stand 5  Supervision   Additional items   (c RW)   Stand to Sit 5  Supervision   Additional items   (c RW)   Functional Mobility   Functional Mobility 5  Supervision   Additional Comments Pt S short household distance c RW   Additional items Rolling walker   Balance   Static Sitting Fair +   Dynamic Sitting Fair   Static Standing [address] - " Activity Tolerance   Activity Tolerance Patient tolerated treatment well   Medical Staff Made Aware DPT La 2' pt's medical complexity, comorbitities and regression from baseline   Nurse Made Aware OK per RN   RUE Assessment   RUE Assessment WFL   LUE Assessment   LUE Assessment X   LUE Strength   L Shoulder Flexion 4/5   L Shoulder Extension 4/5   L Elbow Flexion 2+/5   L Elbow Extension 2+/5   Hand Function   Gross Motor Coordination Functional   Fine Motor Coordination Impaired   Hand Function Comments Dec extension of L hand, dec FM coordination- poor ability to perform opposition of all digits in L hand   Sensation   Additional Comments Pt reports difficulty with obtaining items in L hand due to thinking she has picked something up but actually hasn't  Expect this is motor planning difficulty   Vision - Complex Assessment   Visual Fields Difficulty detecting stimulus with OD LUQ; Difficulty detecting stimulus with OD LLQ; Difficulty detecting stimulus with OS LUQ; Difficulty detecting stimulus with OS LLQ  (L homonymus hemianopsia)   Visual Screen Results Left homonymous hemianopsia   Psychosocial   Psychosocial (WDL) WDL   Cognition   Overall Cognitive Status Impaired   Arousal/Participation Alert; Responsive; Cooperative   Attention Attends with cues to redirect   Orientation Level Oriented X4   Memory Decreased recall of recent events   Following Commands Follows one step commands with increased time or repetition   Comments Pt pleasant and cooperative, with fair safety awareness and insight to condition  Assessment   Limitation Decreased ADL status; Decreased UE ROM; Decreased UE strength;Decreased Safe judgement during ADL;Decreased endurance;Decreased cognition;Decreased fine motor control;Decreased self-care trans;Decreased high-level ADLs   Prognosis Fair   Assessment Pt is a 75year old female admitted 4/29/23 with stroke like symptoms; sudden left sided weakness, left sided facial droop, and "orthostatic hypotension  CT imaging showed \"Old right MCA infarcts  No evidence of acute intracranial hemorrhage\"  Pt  has a past medical history of Anxiety, Asthma, Bipolar depression ([facility] ), Chronic narcotic dependence ([facility] ), Chronic pain, Depression, Enterovirus heart infection, SHAR (obstructive sleep apnea), Osteoarthritis, Peripheral neuropathy, Plantar fasciitis of right foot, Senile osteoporosis, Seronegative arthropathy of multiple sites ([facility] ), and Undifferentiated [facility] tissue disease ([facility] )  Pt with active OT eval and treat orders  Pt lives in a 2 story home, with no TAB  She lives with her brother and son  After her hospitalization in February, her son converted the 1st floor into a first floor set up with sofa bed and bath  She has a walk in shower with bars and a shower chair, and a standard toilet with grab bars  She uses a RW for FM  PTA, pt was A for some ADLs - reports her son helps her get out of bed in the morning and helps with some dressing  Dtr comes over 3x/week for bathing  She req A for IADLs, and her brother and son take care of transportation, meds and meals  She is I for FM c RW  She reports having home OT, PT and speech, PTA  Currently, pt is Min A for UB ADLs and Mod A for LB ADLs  She is S for functional transfers and FM c RW  Pt is limited by dec ADL/High level ADL/self care status, dec UE ROM/strength, dec safe judgement, dec cognition, dec endurance, and dec fine motor control  This limits pt's ability to safely and effectively complete daily routines and activities  The patient's raw score on the -PAC Daily Activity Inpatient Short Form is 21  A raw score of greater than or equal to 19 suggests the patient may benefit from discharge to home with home OT  Please refer to the recommendation of the Occupational Therapist for safe discharge planning   Pt would benefit from OT services focusing on ADL retraining, visual perceptual retraining, functional transfer training, UE " strengthening/ROM, endurance training, equipment and compensatory technique education, energy conservation strategies, cognitive reorientation, activity engagement and neuromuscular reeducation  Pt would benefit from acute OT services 3-5x/week for 10-14 days to achieve goals  Goals   Patient Goals To get home again with some independence   LTG Time Frame 10-14   Plan   Treatment Interventions ADL retraining;Visual perceptual retraining;Functional transfer training;UE strengthening/ROM; Endurance training;Patient/family training;Equipment evaluation/education; Fine motor coordination activities; Compensatory technique education; Energy conservation; Activityengagement;Cognitive reorientation; Neuromuscular reeducation   Goal Expiration Date 05/14/23   OT Frequency 3-5x/wk   Recommendation   OT Discharge Recommendation Home with home health rehabilitation   AM-PAC Daily Activity Inpatient   Lower Body Dressing 3   Bathing 3   Toileting 3   Upper Body Dressing 4   Grooming 4   Eating 4   Daily Activity Raw Score 21   Daily Activity Standardized Score (Calc for Raw Score >=11) 44 27   AM-PAC Applied Cognition Inpatient   Following a Speech/Presentation 4   Understanding Ordinary Conversation 4   Taking Medications 3   Remembering Where Things Are Placed or Put Away 3   Remembering List of 4-5 Errands 3   Taking Care of Complicated Tasks 3   Applied Cognition Raw Score 20   Applied Cognition Standardized Score 41 76   Modified Carly Scale   Modified Perkins Scale 4   End of Consult   Education Provided Yes   Patient Position at End of Consult Bedside chair;Bed/Chair alarm activated; All needs within reach   Nurse Communication Nurse aware of consult     Pt will complete UB ADL tasks with Mod I using appropriate DME and compensatory techniques as needed  Pt will complete LB ADL tasks with Mod I using appropriate DME and compensatory techniques as needed       Pt will complete toileting with Mod I using appropriate DME as needed  Pt will complete household functional mobility with appropriate DME as needed  Pt will complete functional daily tasks for at least 30 minutes with breaks as needed  Pt will utilize energy conservation techniques to complete functional daily tasks with Mod I  Pt will complete functional transfers with Mod I and appropriate DME as needed in order to complete functional daily tasks  Pt will increase UE strength +1 MMT for completion of functional daily tasks  Pt will increase UE range of motion by 10' in order to complete functional daily tasks  Pt will utilize compensatory techniques as needed in order to complete daily routine tasks  Pt will increase overall safety awareness and insight to condition for safe engagement in typical daily routine  Pt will engage in cognitive assessments to assist with safe d/c plan         MANUEL Sol

## 2023-04-30 NOTE — SPEECH THERAPY NOTE
Speech Language/Pathology  Pt attempted to be seen w/ lunch tray  Pt refused, stated she was nauseous and going down to MRI soon  Left tray, may want to eat when she returns  Will f/u as able today

## 2023-04-30 NOTE — ASSESSMENT & PLAN NOTE
· Continue Wellbutrin 100 mg twice daily  · Cymbalta 60 mg twice daily  · Ativan 0 25 mg twice daily as needed

## 2023-04-30 NOTE — ASSESSMENT & PLAN NOTE
· On Minipress 1 mg at bedtime  · Aldactone 25 mg daily  · Metoprolol tartrate 25 mg every 12  Will hold if patient's blood pressure less than 105 or heart rate less than 50  · Patient claims that she lost weight since last stroke in February of this year  We will do orthostatics  · If patient has borderline blood pressure; mayNeed to cut down on blood pressure medications

## 2023-04-30 NOTE — CONSULTS
Oral and Maxillofacial Surgery Consult    Pt seen 04/30/23 10:23 AM     HPI: 75 y o  female w PMH asthma, anxiety, bipolar depression, seronegative arthropathy of multiple joints, connective tissue disorder, SHAR, stroke  Pt currently admitted for worsening stroke-like symptoms possibly secondary to orthostatic hypotension  Consult requested by medicine for eval of nasal bone fx  Pt states that she broke her nose last year and only complaint is difficulty breathing at night from right nostril  Denies fever, chills, nausea, odynophagia, dysphagia, dyspnea, shortness of breath  PMH:   Past Medical History:   Diagnosis Date    Anxiety     Asthma     [facility]     [facility]     Chronic pain     Depression     Enterovirus heart infection     SHAR (obstructive sleep apnea)     Osteoarthritis     Peripheral neuropathy     Plantar fasciitis of right foot     Senile osteoporosis     Seronegative arthropathy of multiple sites ([facility] )     Undifferentiated connective tissue disease ([facility])         Allergies:    Allergies   Allergen Reactions    Cephalosporins Other (See Comments)     rash    Dye [Iodinated Contrast Media] Anaphylaxis     IVP dye    Iodine - Food Allergy Anaphylaxis     Anaphylaxis      Methocarbamol Hives    Sulfa Antibiotics Hives    Bactrim [Sulfamethoxazole-Trimethoprim] Hives    Erythromycin Hives    Penicillins     Gabapentin Rash       Meds:     Current Facility-Administered Medications:     acetaminophen (TYLENOL) tablet 650 mg, 650 mg, Oral, Q6H PRN, Dana Garcia MD    albuterol (PROVENTIL HFA,VENTOLIN HFA) inhaler 2 puff, 2 puff, Inhalation, Q6H PRN, Dana Garcia MD    aluminum-magnesium hydroxide-simethicone (MYLANTA) oral suspension 30 mL, 30 mL, Oral, Q6H PRN, Dana Garcia MD    apixaban (ELIQUIS) tablet 5 mg, 5 mg, Oral, BID, Dana Garcia MD, 5 mg at 04/30/23 0959    aspirin chewable tablet 81 mg, 81 mg, Oral, Daily, Dana Garcia MD, 81 mg at 04/30/23 0956    atorvastatin (LIPITOR) tablet 40 mg, 40 mg, Oral, QPM, Dana Garcia MD, 40 mg at 04/29/23 2337    buPROPion [facility]) tablet 100 mg, 100 mg, Oral, BID, Dana Garcia MD, 100 mg at 04/30/23 0959    cinacalcet (SENSIPAR) tablet 30 mg, 30 mg, Oral, Daily With Breakfast, Dana Garcia MD, 30 mg at 04/30/23 0956    DULoxetine (CYMBALTA) delayed release capsule 60 mg, 60 mg, Oral, BID, Dana Garcia MD, 60 mg at 04/30/23 0955    gabapentin (NEURONTIN) capsule 100 mg, 100 mg, Oral, BID, Dana Garcia MD, 100 mg at 04/30/23 0956    gabapentin (NEURONTIN) capsule 300 mg, 300 mg, Oral, BID, Dana Garcia MD, 300 mg at 04/30/23 0959    hydroxychloroquine (PLAQUENIL) tablet 200 mg, 200 mg, Oral, BID With Meals, Dana Garcia MD, 200 mg at 04/30/23 0959    lamoTRIgine (LaMICtal) tablet 150 mg, 150 mg, Oral, HS, Dana Garcia MD, 150 mg at 04/29/23 2337    lidocaine (LIDODERM) 5 % patch 1 patch, 1 patch, Topical, Daily, Dana Garcia MD, 1 patch at 04/30/23 1001    LORazepam (ATIVAN) tablet 0 25 mg, 0 25 mg, Oral, BID PRN, Dana Garcia MD    magnesium Oxide (MAG-OX) tablet 800 mg, 800 mg, Oral, BID, Dana Garcia MD, 800 mg at 04/30/23 0957    melatonin tablet 3 mg, 3 mg, Oral, HS, Dana Garcia MD, 3 mg at 04/29/23 2337    metoprolol tartrate (LOPRESSOR) tablet 25 mg, 25 mg, Oral, Q12H [address], Dana Garcia MD, 25 mg at 04/30/23 1000    morphine (MSIR) IR tablet 7 5 mg, 7 5 mg, Oral, BID PRN, Dana Garcia MD    ondansetron [facility]) injection 4 mg, 4 mg, Intravenous, Q6H PRN, Dana Garcia MD, 4 mg at 04/30/23 0955    pantoprazole (PROTONIX) EC tablet 40 mg, 40 mg, Oral, Early Morning, Dana Garcia MD, 40 mg at 04/30/23 0510    polyethylene glycol (MIRALAX) packet 17 g, 17 g, Oral, Daily PRN, Dana Garcia MD    prazosin (MINIPRESS) capsule 1 mg, 1 mg, Oral, HS, Gonzalez Juárez MD    senna-docusate sodium (SENOKOT S) 8 6-50 mg per tablet 1 tablet, 1 tablet, Oral, BID, Gonzalez Juárez MD    spironolactone (ALDACTONE) tablet 25 mg, 25 mg, Oral, Daily, Gonzalez Juárez MD, 25 mg at 04/30/23 1000    PSH:   Past Surgical History:   Procedure Laterality Date    BREAST IMPLANT      FIXATION KYPHOPLASTY LUMBAR SPINE      HYSTERECTOMY      INCONTINENCE SURGERY N/A     IR STROKE ALERT  2/12/2023    NASAL SEPTOPLASTY W/ TURBINOPLASTY N/A     RECTAL PROLAPSE REPAIR N/A     REPAIR RECTOCELE      RHINOPLASTY N/A       Family History   Problem Relation Age of Onset    No Known Problems Mother     No Known Problems Father     Arthritis Family     Diabetes Family     Stroke Family         Review of Systems   HENT: Positive for trouble swallowing  Neurological: Positive for facial asymmetry and weakness  All other systems reviewed and are negative  Temp:  [97 5 °F (36 4 °C)-97 6 °F (36 4 °C)] 97 5 °F (36 4 °C)  HR:  [73-96] 80  Resp:  [20-22] 22  BP: ()/() 113/51  SpO2:  [82 %-98 %] 98 %     No intake or output data in the 24 hours ending 04/30/23 1023     Physical Exam:  Gen: AAOx3  NAD  Resp: Unlabored on RA  Cards: RRR  Neuro:  Bilateral CN V2-V3 grossly intact  R CN VII grossly intact  L CN VII weakness  HEENT:   Eye: EOMI w/ no signs of muscle entrapment  PERRL  No subconjunctival hemorrhage  No periorbital ecchymosis/edema  No changes to vision, diplopia, exophthalmos, enophthalmos  Nose: Nasal dorsum deviation to the right  No septal hematoma  R septal deviation  No swelling across nasal bridge  Extraoral:  No facial swelling  No ecchymosis  No bony step palpated  No laceration present  No TTP  Bilateral prominent LAD  Bilateral neck prominence and fullness  Inferior border of the mandible is palpable  Intraoral: OLEG ~40mm  Occlusion stable and reproducible  Partially dentate   No "mobility of segments  No mobile teeth  No gingival laceration present  No ecchymosis in vestibule/FOM  No TTP  No vestibular swelling, erythema, purulence, or draining fistula  No trismus  FOM soft, non-elevated, non-tender  Uvula midline  Imaging: I have personally reviewed pertinent reports  and I have personally reviewed pertinent films in PACS    Assessment  75 y o  female  evaluated for nasal bone fx  On bedside exam and radiographic findings,  R anterior nasal bone fx and L nasal bone fx likely chronic  To note, pt states that R LAD was biopsied 2 years prior and the results came back \"negative\"  Pt did not notice large LAD on L side  Further investigation indicated  Per chart review, pt actually had FNA on L back in 2019 and was negative  No acute OMFS intervention for nasal bone fx, pt can follow up outpatient w ENT/plastics for repair, if she so wishes  Plan:  - No abx indicated  - Analgesia as per primary  - Peridex 15mL swish and spit BID x7d  - Salt water rinses after meals  - Encourage good oral hygiene  - Head of bed elevated  -D/w OMFS attg on call    Inpatient consult to Oral and Maxillofacial Surgery  Consult performed by: Celso Laird DMD  Consult ordered by: Pearl Shankar PA-C           Counseling / Coordination of Care  Total floor / unit time spent today 30 minutes  Greater than 50% of total time was spent with the patient and / or family counseling and / or coordination of care      "

## 2023-04-30 NOTE — PROGRESS NOTES
[address] [facility]  Progress Note  Name: Angela Watkins  MRN: [id number]  [facility] I Date of Admission: 4/29/2023   Date of Service: 4/30/2023 I Hospital Day: 1    Assessment/Plan   * Acute CVA (cerebrovascular accident) [facility])  Assessment & Plan  · Presented after son noted worsening left-sided facial droop  Note recent admission for CVA with residual L sided weakness  · Consider recrudescence of prior stroke symptoms possibly secondary to hypotension as patient noted her BP was low at home   · Appreciate neurology consult and recommendations   · Repeat MRI ordered and pending   · Continue home Eliquis   · Hold on ASA and statin   · Continue neurochecks  · Therapy has cleared for home health rehabilitation   · Awaiting formal speech therapy evaluation     HTN (hypertension)  Assessment & Plan  · BP acceptably, monitor routinely   · Continue PTA Minipress 1 mg at bedtime, Aldactone 25 mg daily, Metoprolol tartrate 25 mg every 12 hours   · Patient claims she had low blood pressures at home   · Check orthostatics    Rheumatoid arthritis (HCC)  Assessment & Plan  · Continue Plaquenil 200 mg twice daily, Neurontin 400 mg twice daily  Bipolar depression ([facility] [address] )  Assessment & Plan  · Continue Wellbutrin 100 mg twice daily, Cymbalta 60 mg twice daily, Ativan 0 25 mg twice daily as needed  Mild intermittent asthma without complication  Assessment & Plan  · No signs of exacerbation   · Continue albuterol as needed          VTE Pharmacologic Prophylaxis:   Moderate Risk (Score 3-4) - Pharmacological DVT Prophylaxis Ordered: apixaban (Eliquis)  Patient Centered Rounds: I performed bedside rounds with nursing staff today  Discussions with Specialists or Other Care Team Provider: primary RN, neurology     Education and Discussions with Family / Patient: Updated  (daughter) via phone      Total Time Spent on Date of Encounter in care of patient: 35 minutes This time was spent on one or more of the following: performing physical exam; counseling and coordination of care; obtaining or reviewing history; documenting in the medical record; reviewing/ordering tests, medications or procedures; communicating with other healthcare professionals and discussing with patient's family/caregivers  Current Length of Stay: 1 day(s)  Current Patient Status: Inpatient   Certification Statement: The patient will continue to require additional inpatient hospital stay due to pending MRI brain, neurology clearance  Discharge Plan: Anticipate discharge in 24-48 hrs to home with home services  Code Status: Level 1 - Full Code    Subjective:   Patient complains of nausea, unrelieved with Zofran, and loose stools today  Otherwise states she feels to be in her usual state of health  Denies smoke smell that she was experiencing yesterday  Denies new or worsening LUE neurologic symptoms  She tells me nasal bone fracture are old from last year  Tells me she didn't really fall yesterday, but rather dropped to her knees when she was ambulating with walker  She is in agreement with deferring MRI for now  She notes she has some confusion in regards to what exactly was determined and done on her prior admission  Objective:     Vitals:   Temp (24hrs), Av 5 °F (36 4 °C), Min:97 4 °F (36 3 °C), Max:97 6 °F (36 4 °C)    Temp:  [97 4 °F (36 3 °C)-97 6 °F (36 4 °C)] 97 4 °F (36 3 °C)  HR:  [73-96] 93  Resp:  [20-22] 22  BP: ()/() 121/71  SpO2:  [82 %-99 %] 99 %  Body mass index is 22 69 kg/m²  Input and Output Summary (last 24 hours):   No intake or output data in the 24 hours ending 23 1551    Physical Exam:   Physical Exam  Vitals and nursing note reviewed  Constitutional:       General: She is not in acute distress  Cardiovascular:      Rate and Rhythm: Normal rate and regular rhythm  Pulmonary:      Effort: Pulmonary effort is normal  No respiratory distress  Neurological:      General: No focal deficit present  Mental Status: She is alert and oriented to person, place, and time  Mental status is at baseline  Additional Data:     Labs:  Results from last 7 days   Lab Units 04/30/23  0435   WBC Thousand/uL 4 56   HEMOGLOBIN g/dL 10 7*   HEMATOCRIT % 32 9*   PLATELETS Thousands/uL 225   NEUTROS PCT % 58   LYMPHS PCT % 30   MONOS PCT % 11   EOS PCT % 0     Results from last 7 days   Lab Units 04/30/23  0435   SODIUM mmol/L 142   POTASSIUM mmol/L 3 3*   CHLORIDE mmol/L 112*   CO2 mmol/L 27   BUN mg/dL 9   CREATININE mg/dL 1 04   ANION GAP mmol/L 3*   CALCIUM mg/dL 8 4   ALBUMIN g/dL 2 6*   TOTAL BILIRUBIN mg/dL 0 54   ALK PHOS U/L 52   ALT U/L 14   AST U/L 17   GLUCOSE RANDOM mg/dL 79     Results from last 7 days   Lab Units 04/29/23 2044   INR  1 66*     Results from last 7 days   Lab Units 04/29/23 2023   POC GLUCOSE mg/dl 126               Lines/Drains:  Invasive Devices     Peripheral Intravenous Line  Duration           Peripheral IV 04/30/23 Distal;Dorsal (posterior); Left Forearm <1 day                  Telemetry:  Telemetry Orders (From admission, onward)             48 Hour Telemetry Monitoring  Continuous x 48 hours        References:    Telemetry Guidelines   Question:  Reason for 48 Hour Telemetry  Answer:  Acute CVA (<24 hrs old, hemispheric strokes, selected brainstem strokes, cardiac arrhythmias)                 Telemetry Reviewed: Normal Sinus Rhythm  Indication for Continued Telemetry Use: No indication for continued use  Will discontinue                Imaging: Reviewed radiology reports from this admission including: CT head    Recent Cultures (last 7 days):         Last 24 Hours Medication List:   Current Facility-Administered Medications   Medication Dose Route Frequency Provider Last Rate    acetaminophen  650 mg Oral Q6H PRN Makenzie Weathers MD      albuterol  2 puff Inhalation Q6H PRN MD Jimmy Espinal aluminum-magnesium hydroxide-simethicone  30 mL Oral Q6H PRN Berry Rodriguez MD      apixaban  5 mg Oral BID Berry Rodriguez MD      aspirin  81 mg Oral Daily Berry Rodriguez MD      atorvastatin  40 mg Oral QPM Berry Rodriguez MD      buPROPion  100 mg Oral BID Berry Rodriguez MD      cinacalcet  30 mg Oral Daily With Breakfast Berry Rodriguez MD      DULoxetine  60 mg Oral BID Berry Rodriguez MD      gabapentin  100 mg Oral BID Berry Rodriguez MD      gabapentin  300 mg Oral BID Berry Rodriguez MD      hydroxychloroquine  200 mg Oral BID With Meals Berry Rodriguez MD      lamoTRIgine  150 mg Oral HS Berry Rodriguez MD      lidocaine  1 patch Topical Daily Berry Rodriguez MD      LORazepam  1 mg Intravenous Once PRN Stephanie Riley PA-C      magnesium Oxide  800 mg Oral BID Berry Rodriguez MD      melatonin  3 mg Oral HS Berry Rodriguez MD      metoclopramide  10 mg Intravenous Q6H PRN Stephanie Riley PA-C      metoprolol tartrate  25 mg Oral Q12H [address] Berry Rodriguez MD      morphine  7 5 mg Oral BID PRN Berry Rodriguez MD      ondansetron  4 mg Intravenous Q6H PRN Berry Rodriguez MD      pantoprazole  40 mg Oral Early Morning Berry Rodriguez MD      polyethylene glycol  17 g Oral Daily PRN Berry Rodriguez MD      prazosin  1 mg Oral HS Berry Rodriguez MD      senna-docusate sodium  1 tablet Oral BID Berry Rodriguez MD      spironolactone  25 mg Oral Daily Berry Rodriguez MD          Today, Patient Was Seen By: Napoleon Rodriguez PA-C    **Please Note: This note may have been constructed using a voice recognition system  **

## 2023-04-30 NOTE — PLAN OF CARE
"  Problem: OCCUPATIONAL THERAPY ADULT  Goal: Performs self-care activities at highest level of function for planned discharge setting  See evaluation for individualized goals  Description: Treatment Interventions: ADL retraining, Visual perceptual retraining, Functional transfer training, UE strengthening/ROM, Endurance training, Patient/family training, Equipment evaluation/education, Fine motor coordination activities, Compensatory technique education, Energy conservation, Activityengagement, Cognitive reorientation, Neuromuscular reeducation          See flowsheet documentation for full assessment, interventions and recommendations  Note: Limitation: Decreased ADL status, Decreased UE ROM, Decreased UE strength, Decreased Safe judgement during ADL, Decreased endurance, Decreased cognition, Decreased fine motor control, Decreased self-care trans, Decreased high-level ADLs  Prognosis: Fair  Assessment: Pt is a 75year old female admitted 4/29/23 with stroke like symptoms; sudden left sided weakness, left sided facial droop, and orthostatic hypotension  CT imaging showed \"Old right MCA infarcts  No evidence of acute intracranial hemorrhage\"  Pt  has a past medical history of Anxiety, Asthma, Bipolar depression ([facility] [address] ), Chronic narcotic dependence ([facility] [address] ), Chronic pain, Depression, Enterovirus heart infection, SHAR (obstructive sleep apnea), Osteoarthritis, Peripheral neuropathy, Plantar fasciitis of right foot, Senile osteoporosis, Seronegative arthropathy of multiple sites ([facility] [address] ), and Undifferentiated connective tissue disease ([facility] [address] )  Pt with active OT eval and treat orders  Pt lives in a 2 story home, with no TAB  She lives with her brother and son  After her hospitalization in February, her son converted the 1st floor into a first floor set up with sofa bed and bath  She has a walk in shower with bars and a shower chair, and a standard toilet with grab bars  She uses a RW for FM     PTA, pt was A for some ADLs - " reports her son helps her get out of bed in the morning and helps with some dressing  Dtr comes over 3x/week for bathing  She req A for IADLs, and her brother and son take care of transportation, meds and meals  She is I for FM c RW  She reports having home OT, PT and speech, PTA  Currently, pt is Min A for UB ADLs and Mod A for LB ADLs  She is S for functional transfers and FM c RW  Pt is limited by dec ADL/High level ADL/self care status, dec UE ROM/strength, dec safe judgement, dec cognition, dec endurance, and dec fine motor control  This limits pt's ability to safely and effectively complete daily routines and activities  The patient's raw score on the -PAC Daily Activity Inpatient Short Form is 21  A raw score of greater than or equal to 19 suggests the patient may benefit from discharge to home with home OT  Please refer to the recommendation of the Occupational Therapist for safe discharge planning  Pt would benefit from OT services focusing on ADL retraining, visual perceptual retraining, functional transfer training, UE strengthening/ROM, endurance training, equipment and compensatory technique education, energy conservation strategies, cognitive reorientation, activity engagement and neuromuscular reeducation  Pt would benefit from acute OT services 3-5x/week for 10-14 days to achieve goals       OT Discharge Recommendation: Home with home health rehabilitation

## 2023-04-30 NOTE — ED ATTENDING ATTESTATION
4/29/2023  IIvanna DO, saw and evaluated the patient  I have discussed the patient with the resident/non-physician practitioner and agree with the resident's/non-physician practitioner's findings, Plan of Care, and MDM as documented in the resident's/non-physician practitioner's note, except where noted  All available labs and Radiology studies were reviewed  I was present for key portions of any procedure(s) performed by the resident/non-physician practitioner and I was immediately available to provide assistance  At this point I agree with the current assessment done in the Emergency Department  I have conducted an independent evaluation of this patient a history and physical is as follows:      76 yo female presents for evaluation of increased dysarthria, worsening of her stroke symptoms from February 23  Had endovascular intervention on 2/12/23 by dr Nobles Estimable  Symptoms noted at 1810  Pre-hospital stroke alert was activated based on symptoms  On arrival NIH SS = 1  Pt has IV dye allergy  CTA deferred  Pt also takes apixaban, not a tPA candidate as a result  As well as NIH SS = 1    Recommend continuing CVA pathway, MRI as inpatient        ED Course         Critical Care Time  Procedures

## 2023-04-30 NOTE — H&P
[facility] [facility]  H&P  Name: Feliciano Johnson 75 y o  female I MRN: [id number]  Unit/Bed#: ED 24 I Date of Admission: 4/29/2023   Date of Service: 4/29/2023 I Hospital Day: 0      Assessment/Plan   Acute CVA (cerebrovascular accident) [facility])  Assessment & Plan  · May be just worsening of strokelike symptoms possibly secondary to orthostatic hypotension  · Noted patient has lost weight over the past few months since the last stroke episode  · Son states that she just took all of her nighttime medications; they were eating dinner and suddenly having left-sided worsening weakness  There is a baseline left-sided weakness and droop after last stroke admission  · We will do orthostatic blood pressures  · We will continue with current metoprolol dose; hold for heart rate less than 50 and/or blood pressure less than 105  · Continue neurology consult  · Continue physical therapy, physical and Occupational Therapy consult with case management  · Lipid profile, TSH and hemoglobin A1c  · Patient not on aspirin and Lipitor; as per protocol we will place patient on atorvastatin 40 mg daily  ASA 81 mg daily  · MRI for tomorrow  · Awaiting further neurology recommendations  · Continue apixaban 5 mg twice daily  HTN (hypertension)  Assessment & Plan  · On Minipress 1 mg at bedtime  · Aldactone 25 mg daily  · Metoprolol tartrate 25 mg every 12  Will hold if patient's blood pressure less than 105 or heart rate less than 50  · Patient claims that she lost weight since last stroke in February of this year  We will do orthostatics  · If patient has borderline blood pressure; mayNeed to cut down on blood pressure medications  Mild intermittent asthma without complication  Assessment & Plan  Continue albuterol  [facility]  Assessment & Plan  · Continue Wellbutrin 100 mg twice daily  · Cymbalta 60 mg twice daily    · Ativan 0 25 mg twice daily as needed  Rheumatoid arthritis (HCC)  Assessment & Plan  · Continue Plaquenil 200 mg twice daily  · Neurontin 400 mg twice daily  VTE Prophylaxis: Apixaban (Eliquis)  / sequential compression device   Code Status: Level 1 - Full Code as discussed with patient in front of son  POLST: There is no POLST form on file for this patient (pre-hospital)    Anticipated Length of Stay:  Patient will be admitted on an Inpatient basis with an anticipated length of stay of greater than 2 midnights  Justification for Hospital Stay: Please see detailed plans noted above  Neurology consult  Stroke work-up  MRI for tomorrow  There is a concern whether patient is mildly hypotensive due to weight loss and still currently on blood pressure medications  We will do orthostatics  Telemetry  Chief Complaint:     Worsening of left-sided Weakness and facial droop  History of Present Illness:  Flores Lee is a 75 y o  female who has past medical history significant for asthma, anxiety, bipolar depression, seronegative arthropathy of multiple joints, connective tissue disorder, obstructive sleep apnea  Patient had in February of this year an acute episode of stroke with questionable seizure event versus encephalopathy  At that time, MRI of the brain showed new small acute/more recent interval lacunar infarction in the right cerebral peduncle  Extensive multifocal large, previously present right middle cerebral artery infarction which is evolving with superimposed areas of evolving hemorrhage likely related to hemorrhagic transformation of subacute right MCA infarction  Patient was placed on statin  She has a longstanding left-sided weakness especially left facial droop and dysarthria  Patient is currently accompanied by her son who takes care of her welfare  He is also responsible for her medications  Approximately around 8:00, she received her medications among which includes metoprolol and prazosin  She also takes spironolactone in the morning  During dinner, she suddenly had an episode of pronounced left-sided facial droop with more pronounced dysarthria  This approximately lasted 20 minutes  But because of this concern, the patient was brought here to the emergency room for further evaluation as a stroke alert  Her CT stroke study showed old right MCA distribution infarcts with no evidence of acute intracranial hemorrhage with chronic microangiopathic changes  Displaced right nasal fractures and nondisplaced left nasal fracture  In addition to the events that happened this evening, family admitted that this morning she fell and was walking unsteady going to the bathroom  She ambulates with her walker  Patient states that during the episode she may have had a headache but no dizziness  No chest pain  No nausea or vomiting or any recent diarrhea  No sick contacts  Currently, patient is lying flat on bed  Patient states that she is back to baseline      Review of Systems:    Constitutional:  Denies fever or chills   Eyes:  Denies change in visual acuity   HENT:  Denies nasal congestion or sore throat   Respiratory:  Denies cough or shortness of breath   Cardiovascular:  Denies chest pain or edema   GI:  Denies abdominal pain, nausea, vomiting, bloody stools or diarrhea   :  Denies dysuria   Musculoskeletal:  Denies back pain or joint pain   Integument:  Denies rash   Neurologic: Initially with headache more pronounced left-sided dysarthria and facial droop  Endocrine:  Denies polyuria or polydipsia   Psychiatric:  Denies depression or anxiety     Past Medical and Surgical History:   Past Medical History:   Diagnosis Date    Anxiety     Asthma     Bipolar depression ([facility] [address] )     Chronic narcotic dependence ([facility] )     Chronic pain     Depression     Enterovirus heart infection     SHAR (obstructive sleep apnea)     Osteoarthritis     Peripheral neuropathy     Plantar fasciitis of right foot  Senile osteoporosis     Seronegative arthropathy of multiple sites ([facility] [address] )     Undifferentiated connective tissue disease ([facility] )      Past Surgical History:   Procedure Laterality Date    BREAST IMPLANT      FIXATION KYPHOPLASTY LUMBAR SPINE      HYSTERECTOMY      INCONTINENCE SURGERY N/A     IR STROKE ALERT  2/12/2023    NASAL SEPTOPLASTY W/ TURBINOPLASTY N/A     RECTAL PROLAPSE REPAIR N/A     REPAIR RECTOCELE      RHINOPLASTY N/A        Meds/Allergies:  (Not in a hospital admission)      Allergies:    Allergies   Allergen Reactions    Cephalosporins Other (See Comments)     rash    Dye [Iodinated Contrast Media] Anaphylaxis     IVP dye    Iodine - Food Allergy Anaphylaxis     Anaphylaxis      Methocarbamol Hives    Sulfa Antibiotics Hives    Bactrim [Sulfamethoxazole-Trimethoprim] Hives    Erythromycin Hives    Penicillins     Gabapentin Rash     History:  Marital Status:    Occupation: She is retired  Patient Pre-hospital Living Situation: Lives at home  Patient Pre-hospital Level of Mobility: Ambulatory but using a walker  Patient Pre-hospital Diet Restrictions: Regular consistency  Substance Use History:   Social History     Substance and Sexual Activity   Alcohol Use Not Currently    Comment: quit     Social History     Tobacco Use   Smoking Status Former   Smokeless Tobacco Never     Social History     Substance and Sexual Activity   Drug Use Not Currently    Types: Marijuana    Comment: has medical card       Family History:  Family History   Problem Relation Age of Onset    No Known Problems Mother     No Known Problems Father     Arthritis Family     Diabetes Family     Stroke Family        Physical Exam:     Vitals:   Blood Pressure: 105/61 (04/29/23 2115)  Pulse: 76 (04/29/23 2115)  Temperature: 97 6 °F (36 4 °C) (04/29/23 2026)  Temp Source: Oral (04/29/23 2026)  Respirations: 22 (04/29/23 2115)  Weight - Scale: 64 3 kg (141 lb 12 1 oz) (04/29/23 2045)  SpO2: 95 % (04/29/23 2115)    Constitutional:  Well developed, well nourished, no acute distress, non-toxic appearance and lying flat in bed; has baseline dysarthria which according to son is the usual   Left facial droop  Drying of the facial skin  Eyes:  PERRL, conjunctiva normal   HENT:  Atraumatic, external ears normal, nose normal, oropharynx moist, no pharyngeal exudates  Note of tongue deviated towards the right with bilateral palatal elevation equal   Neck- normal range of motion, no tenderness, supple   Respiratory:  No respiratory distress, normal breath sounds, no rales, no wheezing   Cardiovascular:  Normal rate, normal rhythm, no murmurs, no gallops, no rubs   GI:  Soft, nondistended, normal bowel sounds, nontender, no organomegaly, no mass, no rebound, no guarding   :  No costovertebral angle tenderness   Musculoskeletal:  No edema, no tenderness, no deformities  Back- no tenderness  Integument:  Well hydrated, no rash   Lymphatic:  No lymphadenopathy noted   Neurologic:  Alert &awake, communicative, left-sided facial droop, tongue towards the right, equal palatal elevation, weakness of the left compared to the right but able to move against gravity, normal sensory function, no focal deficits noted   Psychiatric:  Speech and behavior appropriate       Lab Results: I have personally reviewed pertinent reports  Results from last 7 days   Lab Units 04/29/23 2044   WBC Thousand/uL 5 59   HEMOGLOBIN g/dL 11 3*   HEMATOCRIT % 34 1*   PLATELETS Thousands/uL 249     Results from last 7 days   Lab Units 04/29/23 2044   POTASSIUM mmol/L 3 8   CHLORIDE mmol/L 108   CO2 mmol/L 31   BUN mg/dL 12   CREATININE mg/dL 1 32*   CALCIUM mg/dL 8 4     Results from last 7 days   Lab Units 04/29/23  2044   INR  1 66*           Imaging: I have personally reviewed pertinent reports  CT stroke alert brain    Result Date: 4/29/2023  Narrative: CT BRAIN - STROKE ALERT PROTOCOL INDICATION:   Stroke Alert   COMPARISON: 3/7/2023 TECHNIQUE:  CT examination of the brain was performed  In addition to axial images, coronal reformatted images were created and submitted for interpretation  Radiation dose length product (DLP) for this visit:  917 mGy-cm   This examination, like all CT scans performed in the [facility], was performed utilizing techniques to minimize radiation dose exposure, including the use of iterative reconstruction and automated exposure control  IMAGE QUALITY:  Diagnostic  FINDINGS: PARENCHYMA: Old right MCA distribution infarcts  No evidence of acute intracranial hemorrhage  Chronic microangiopathic changes  VENTRICLES AND EXTRA-AXIAL SPACES:  Normal for the patient's age  VISUALIZED ORBITS: Normal visualized orbits  PARANASAL SINUSES: Normal visualized paranasal sinuses  CALVARIUM AND EXTRACRANIAL SOFT TISSUES: Displaced right nasal fractures and nondisplaced left nasal fracture  Impression: Old right MCA distribution infarcts  No evidence of acute intracranial hemorrhage  Chronic microangiopathic changes  Displaced right nasal fractures and nondisplaced left nasal fracture  I personally discussed this study with Dr Fatmata Rendon on 4/29/2023 8:24 PM  Workstation performed: [id number]         ** Please Note: Dragon 360 Dictation voice to text software was used in the creation of this document   **

## 2023-04-30 NOTE — ASSESSMENT & PLAN NOTE
· May be just worsening of strokelike symptoms possibly secondary to orthostatic hypotension  · Noted patient has lost weight over the past few months since the last stroke episode  · Son states that she just took all of her nighttime medications; they were eating dinner and suddenly having left-sided worsening weakness  There is a baseline left-sided weakness and droop after last stroke admission  · We will do orthostatic blood pressures  · We will continue with current metoprolol dose; hold for heart rate less than 50 and/or blood pressure less than 105  · Continue neurology consult  · Continue physical therapy, physical and Occupational Therapy consult with case management  · Lipid profile, TSH and hemoglobin A1c  · Patient not on aspirin and Lipitor; as per protocol we will place patient on atorvastatin 40 mg daily  ASA 81 mg daily  · MRI for tomorrow  · Awaiting further neurology recommendations  · Continue apixaban 5 mg twice daily

## 2023-04-30 NOTE — PHYSICAL THERAPY NOTE
Physical Therapy Evaluation    Patient's Name: Marilyn York    Admitting Diagnosis  Stroke [facility]) [I63 9]  Cerebrovascular accident (CVA), unspecified mechanism ([facility] ) [I63 9]    Problem List  Patient Active Problem List   Diagnosis    Chronic pain syndrome    Mild intermittent asthma without complication    Ambulatory dysfunction    Seronegative arthropathy of multiple sites ([facility] )    Gastroesophageal reflux disease without esophagitis    Mixed stress and urge urinary incontinence    CHF (congestive heart failure) ([facility] )    Anemia    Undifferentiated connective tissue disease ([facility] )    Primary generalized (osteo)arthritis    Peripheral neuropathy    Osteoporosis    Hx of compression fracture of spine    Lumbar spondylosis    Cervical spondylosis    Closed fracture of multiple ribs of right side    Traumatic pneumothorax    Closed fracture of transverse process of lumbar vertebra ([facility])    Liver contusion    Fall    Acute pain due to trauma    Right shoulder pain    Severe protein-calorie malnutrition ([facility] )    Bipolar depression ([facility] )    Rheumatoid arthritis ([facility] )    'light-for-dates' infant with signs of fetal malnutrition    LAILA (acute kidney injury) ([facility] )    Polypharmacy    Cardiomyopathy ([facility] )    Vitamin D insufficiency    Stroke ([facility] )    HTN (hypertension)    HFrEF (heart failure with reduced ejection fraction) (formerly [facility])    Hypercalcemia    Discoloration of skin of foot    Abdominal pain    Acute CVA (cerebrovascular accident) ([facility] )    History of bacteremia    Obstructive sleep apnea    History of stroke    UTI (urinary tract infection)    AMS (altered mental status)    Gout of foot    Dysphagia    Wound of left ankle    Hypokalemia    Sinus tachycardia    Hypomagnesemia    Insomnia    Stroke-like symptoms       Past Medical History  Past Medical History:   Diagnosis Date    Anxiety     Asthma     Bipolar depression (HCC)     Chronic narcotic dependence (HCC)     Chronic pain     Depression     Enterovirus heart infection     SHAR (obstructive sleep apnea)     Osteoarthritis     Peripheral neuropathy     Plantar fasciitis of right foot     Senile osteoporosis     Seronegative arthropathy of multiple sites ([facility] )     Undifferentiated connective tissue disease ([facility] )        Past Surgical History  Past Surgical History:   Procedure Laterality Date    BREAST IMPLANT      FIXATION KYPHOPLASTY LUMBAR SPINE      HYSTERECTOMY      INCONTINENCE SURGERY N/A     IR STROKE ALERT  2/12/2023    NASAL SEPTOPLASTY W/ TURBINOPLASTY N/A     RECTAL PROLAPSE REPAIR N/A     REPAIR RECTOCELE      RHINOPLASTY N/A         04/30/23 0858   PT Last Visit   PT Visit Date 04/30/23   Note Type   Note type Evaluation   Pain Assessment   Pain Assessment Tool 0-10   Pain Score 5   Pain Location/Orientation Orientation: Bilateral;Location: Back   Hospital Pain Intervention(s) Repositioned; Ambulation/increased activity   Restrictions/Precautions   Weight Bearing Precautions Per Order No   Other Precautions Chair Alarm; Bed Alarm; Fall Risk   Home Living   Type of [address] [place] [address] Two level; Able to live on main level with bedroom/bathroom  (1 TAB)   Home Equipment Walker   Prior Function   Level of [place] Independent with functional mobility; Needs assistance with ADLs   Lives With Family  (son and brother)   Lavon Sellers Help From Family;Home health  (dgt assists with bathing 3x/week, HH PT and OT)   Falls in the last 6 months 1 to 4  (2 - 1 in the morning prior to admission)   Comments Pt reports she is independent with ambulation with RW, family assists with ADL's and IADL's     General   Family/Caregiver Present No   Cognition   Overall Cognitive Status WFL   Arousal/Participation Alert   Orientation Level Oriented X4   Following Commands Follows multistep commands with increased time or repetition   Comments Pt very pleasant and cooperative, motivated to participate and improve   RLE Assessment   RLE Assessment WFL  (Grossly 4+/5)   LLE Assessment LLE Assessment WFL  (Grossly 4+/5)   Coordination   Movements are Fluid and Coordinated 1  (Gross motor function WNL)   Coordination and Movement Description L UE dymetria, pt reports impairment from prior CVA   Finger to Nose & Finger to Finger  Impaired   Light Touch   RLE Light Touch Grossly intact   LLE Light Touch Grossly intact   Bed Mobility   Additional Comments Pt sitting EOB upon PT arrival   Transfers   Sit to Stand 5  Supervision   Stand to Sit 5  Supervision   Ambulation/Elevation   Gait pattern Decreased foot clearance; Forward Flexion  (kyphotic posture)   Gait Assistance 5  Supervision   Assistive Device Rolling walker   Distance 60 ft, limited by fatigue   Balance   Static Sitting Fair +   Dynamic Sitting Fair -   Static Standing Fair   Dynamic Standing Fair -   Ambulatory Fair -   Activity Tolerance   Activity Tolerance Patient limited by fatigue   Medical Staff Made Aware Co-evaluation with OT given medical complexity and limited activity tolerance   Nurse Made Aware RN updated  Chair alarm engaged   Assessment   Prognosis Good   Problem List Decreased strength;Decreased endurance; Impaired balance;Decreased mobility; Decreased safety awareness;Pain   Assessment Pt is a 75 y o  female seen for PT evaluation s/p admit to [facility] on 4/29/2023  Pt was admitted with a primary dx of: Worsening L sided weakness and facial droop  CTH: Old R MCA distribution infarcts  No acute ICH  Displaced R nasal fractures and nondisplaced L nasal fracture  MRI pending  PT now consulted for assessment of mobility and d/c needs  Pt with Up and OOB as tolerated orders  Pts current comorbidities and personal factors effecting treatment include: HTN, Bipolar disorder, RA, Asthma, CVA in Feb 2022, Anxiety   Pts current clinical presentation is Unstable/Unpredictable (high complexity) due to Ongoing medical management for primary dx, Decreased activity tolerance compared to baseline, Fall risk, Increased assistance needed from caregiver at current time, Diagnostic imaging pending  Prior to admission, pt was independent with use of RW  Upon evaluation, pt currently is requiring  Supervision for transfers and Supervision for ambulation 60 ft w/ RW  Pt presents at PT eval functioning below baseline and currently w/ overall mobility deficits 2* to: impaired balance, gait deviations, pain, decreased activity tolerance compared to baseline, decreased functional mobility tolerance compared to baseline, decreased safety awareness, fall risk  Pt currently at a fall risk 2* to impairments listed above  Pt will continue to benefit from skilled acute PT interventions to address stated impairments; to maximize functional mobility; for ongoing pt/ family training; and DME needs  At conclusion of PT session pt returned back in chair and chair alarm engaged with phone and call bell within reach  Pt denies any further questions at this time  Recommend home with family care and HHPT upon hospital D/C  Goals   Patient Goals to walk   STG Expiration Date 05/14/23   Short Term Goal #1 In 14 days pt will be able to: 1  Demonstrate ability to perform all aspects of bed mobility independently to improve functional safety  2  Perform functional transfers independently to facilitate safe return to previous living environment  3   Ambulate 150 ft with RW independently with stable vitals to improve safety with household distances and reduce fall risk  4  Improve LE strength grades by 1 to increase ease of functional mobility with transfers and gait  5  Pt will demonstrate improved balance by one grade in order to decrease risk of falls  6  Climb 1 step with RW and supervision to simulate entrance to home  PT Treatment Day 0   Plan   Treatment/Interventions Functional transfer training;LE strengthening/ROM; Elevations; Therapeutic exercise; Endurance training;Patient/family training;Equipment eval/education; Bed mobility;Gait training   PT Frequency 2-3x/wk   Recommendation   PT Discharge Recommendation Home with home health rehabilitation  (resume HHPT services)   AM-PAC Basic Mobility Inpatient   Turning in Flat Bed Without Bedrails 4   Lying on Back to Sitting on Edge of Flat Bed Without Bedrails 3   Moving Bed to Chair 3   Standing Up From Chair Using Arms 3   Walk in Room 3   Climb 3-5 Stairs With Railing 3   Basic Mobility Inpatient Raw Score 19   Basic Mobility Standardized Score 42 48   Highest Level Of Mobility   JH-HLM Goal 6: Walk 10 steps or more   JH-HLM Achieved 7: Walk 25 feet or more       Nilo Felipe, PT, DPT, GCS

## 2023-04-30 NOTE — CONSULTS
NEUROLOGY RESIDENCY CONSULT NOTE     Name: Clotilde Sinclair   Age & Sex: 75 y o  female   MRN: [id number]  Unit/Bed#: [facility] 200-1   Encounter: [id number]  Length of Stay: 0    ASSESSMENT & PLAN     Stroke-like symptoms  Assessment & Plan  75 y o  female w h/o recent right MCA stroke status post thrombectomy on 2/12/23 on Eliquis, anxiety, bipolar disorder, chronic pain syndrome, RA, HF  who presents at [facility] ED on 4/29/23 as a stroke alert on 4/29/2023  8:11 PM with initial NIHSS of 2 and LKW 7:10 PM about an hour ago , initial Blood Pressure: 132/60  Initial presenting deficits were worsening facial droop, headache, right arm pain   As a result of pt on eliquis and not disabling symptoms pt was determined to not be a candidate for thrombolysis (TNK)   Exam on 04/29/23:    Current Blood Pressure: 107/67, BP over 24 hours: BP  Min: 99/61  Max: 134/70    Vascular risk factors: previous stroke    Home meds: Eliquis     Workup:  Lab Results   Component Value Date    HGBA1C 5 0 02/13/2023    CHOLESTEROL 181 02/13/2023    LDLCALC 84 02/13/2023    TRIG 81 02/13/2023    INR 1 66 (H) 04/29/2023       CTH:  old right MCA distribution infarcts  No evidence of acute intracranial hemorrhage  Chronic microangiopathic changes  Displaced right nasal fractures and nondisplaced left nasal fracture  C   CTA: deferred as patient is allergic to the dye   MRI: deferred     Pertinent scores:  - NIHSS: 2  Stroke Modified [place] Score: 4 (Moderately severe disability; unable to walk and attend to bodily needs without assistance)    Impression: Low suspicion for neurological issue  Pt reports burning smell while having dinner and pain in right eye region which could be related to right displaced fracture  Low suspicion for stroke at this time  Plan:  · Defer stroke pathway or MRI   · Continue Eliquis 5 mg BID   · Rest of the plan and supportive care per primary service         Follow up with neurology as outpatient routinely, no need for urgent appointment     Pending for discharge: none     SUBJECTIVE     Reason for Consult / Principal Problem: Stroke alert,   Hx and PE limited by: Nothing     HPI: Osiel Moore is a 75 y o  female w h/o recent right MCA stroke status post thrombectomy on 2/12/23 on Eliquis, anxiety, bipolar disorder, chronic pain syndrome, RA, HF  who presents at [facility] ED on 4/29/23 as a stroke alert d/t worsening left facial droop, burning smell, head/arm pain  History is obtained from patient, patient's son over the phone  Patient'son reports she was her normal self, had her night time medication and having dinner at 19:10 when she noted burning smell, her face was droopy than usual, right arm pain this prompted son to call EMS  While in the ED, vitals stable, /60,  NIH SS 1 (chronic dysarthria), CT head revealed old right MCA distribution infarcts  No evidence of acute intracranial hemorrhage  Chronic microangiopathic changes  Displaced right nasal fractures and nondisplaced left nasal fracture  CTA-deferred as patient is allergic to the dye and did not have any new symptoms on exam      During my evaluation, patient reports she feels her normal self except some pain in her right eye region, 5/10 in severity  She also reports that she tripped in the bathroom today  Denies any new symptoms at this time  At baseline- She drags her left foot, she uses a walker, left homonymous hemianopia  Needs some help for ADLs  Reason for Consult / Principal Problem:  Stroke alert, worsening droop, burning smell, headache, arms pain   Time last known well: an hour ago 7:10 PM on 4/29/23  Stroke alert called: 8:11 4/29/23  Neurology stroke alert response:  Immediate   Hx and PE limited by:  Nothing   Review of previous medical records was completed  Initial BP: Blood Pressure: 132/60   Family, specifically son and daughter were present over the phone for history and examination    TPA  Decision: Patient not a candidate  Bleeding risk  on eliquis and non diabling symptoms     NIHSS:  1a Level of Consciousness: 0 = Alert   1b  LOC Questions: 0 = Answers both correctly   1c  LOC Commands: 0 = Obeys both correctly   2  Best Gaze: 0 = Normal   3  Visual: 1 = Partial hemianopia   4  Facial Palsy: 0=Normal symmetric movement   5a  Motor Right Arm: 0=No drift, limb holds 90 (or 45) degrees for full 10 seconds   5b  Motor Left Arm: 0=No drift, limb holds 90 (or 45) degrees for full 10 seconds   6a  Motor Right Le=No drift, limb holds 90 (or 45) degrees for full 10 seconds   6b  Motor Left Le=No drift, limb holds 90 (or 45) degrees for full 10 seconds   7  Limb Ataxia:  0=Absent   8  Sensory: 0=Normal; no sensory loss   9  Best Language:  0=No aphasia, normal   10  Dysarthria: 1=Mild to moderate, patient slurs at least some words and at worst, can be understood with some difficulty   11   Extinction and Inattention (formerly Neglect): 0=No abnormality   Total Score: 2     NIHSS completed at 8:18      Inpatient consult to Neurology  Consult performed by: Mike Urbina MD  Consult ordered by: Fiona Connell DO          Historical Information   Past Medical History:   Diagnosis Date    Anxiety     Asthma     Bipolar depression ([address]     Chronic narcotic dependence ([address] )     Chronic pain     Depression     Enterovirus heart infection     SHAR (obstructive sleep apnea)     Osteoarthritis     Peripheral neuropathy     Plantar fasciitis of right foot     Senile osteoporosis     Seronegative arthropathy of multiple sites ([address] )     Undifferentiated connective tissue disease ([address] )      Past Surgical History:   Procedure Laterality Date    BREAST IMPLANT      FIXATION KYPHOPLASTY LUMBAR SPINE      HYSTERECTOMY      INCONTINENCE SURGERY N/A     IR STROKE ALERT  2023    NASAL SEPTOPLASTY W/ TURBINOPLASTY N/A     RECTAL PROLAPSE REPAIR N/A     REPAIR RECTOCELE      RHINOPLASTY N/A Social History   Social History     Substance and Sexual Activity   Alcohol Use Not Currently    Comment: quit     Social History     Substance and Sexual Activity   Drug Use Not Currently    Types: Marijuana    Comment: has medical card     E-Cigarette/Vaping    E-Cigarette Use Never User      E-Cigarette/Vaping Substances    Nicotine No     THC No     CBD No     Flavoring No     Other No     Unknown No      Social History     Tobacco Use   Smoking Status Former   Smokeless Tobacco Never     Family History:   Family History   Problem Relation Age of Onset    No Known Problems Mother     No Known Problems Father     Arthritis Family     Diabetes Family     Stroke Family      Meds/Allergies   all current active meds have been reviewed, current meds:   Current Facility-Administered Medications   Medication Dose Route Frequency    acetaminophen (TYLENOL) tablet 650 mg  650 mg Oral Q6H PRN    albuterol (PROVENTIL HFA,VENTOLIN HFA) inhaler 2 puff  2 puff Inhalation Q6H PRN    aluminum-magnesium hydroxide-simethicone (MYLANTA) oral suspension 30 mL  30 mL Oral Q6H PRN    apixaban (ELIQUIS) tablet 5 mg  5 mg Oral BID    [START ON 4/30/2023] aspirin chewable tablet 81 mg  81 mg Oral Daily    atorvastatin (LIPITOR) tablet 40 mg  40 mg Oral QPM    buPROPion (WELLBUTRIN) tablet 100 mg  100 mg Oral BID    [START ON 4/30/2023] cinacalcet (SENSIPAR) tablet 30 mg  30 mg Oral Daily With Breakfast    DULoxetine (CYMBALTA) delayed release capsule 60 mg  60 mg Oral BID    gabapentin (NEURONTIN) capsule 100 mg  100 mg Oral BID    gabapentin (NEURONTIN) capsule 300 mg  300 mg Oral BID    [START ON 4/30/2023] hydroxychloroquine (PLAQUENIL) tablet 200 mg  200 mg Oral BID With Meals    lamoTRIgine (LaMICtal) tablet 150 mg  150 mg Oral HS    [START ON 4/30/2023] lidocaine (LIDODERM) 5 % patch 1 patch  1 patch Topical Daily    LORazepam (ATIVAN) tablet 0 25 mg  0 25 mg Oral BID PRN    magnesium Oxide (MAG-OX) tablet 800 mg  800 mg Oral BID    melatonin tablet 3 mg  3 mg Oral HS    metoprolol tartrate (LOPRESSOR) tablet 25 mg  25 mg Oral Q12H [address]    morphine (MSIR) IR tablet 7 5 mg  7 5 mg Oral BID PRN    ondansetron (ZOFRAN) injection 4 mg  4 mg Intravenous Q6H PRN    [START ON 4/30/2023] pantoprazole (PROTONIX) EC tablet 40 mg  40 mg Oral Early Morning    polyethylene glycol (MIRALAX) packet 17 g  17 g Oral Daily PRN    prazosin (MINIPRESS) capsule 1 mg  1 mg Oral HS    senna-docusate sodium (SENOKOT S) 8 6-50 mg per tablet 1 tablet  1 tablet Oral BID    [START ON 4/30/2023] spironolactone (ALDACTONE) tablet 25 mg  25 mg Oral Daily    and PTA meds:   Prior to Admission Medications   Prescriptions Last Dose Informant Patient Reported? Taking? DULOXETINE HCL PO Unknown  Yes No   Sig: Take 60 mg by mouth 2 (two) times a day   LORazepam (ATIVAN) 0 5 mg tablet Unknown  No No   Sig: Take 0 5 tablets (0 25 mg total) by mouth 2 (two) times a day as needed for anxiety   albuterol (PROVENTIL HFA,VENTOLIN HFA) 90 mcg/act inhaler Unknown  No No   Sig: Inhale 2 puffs every 6 (six) hours as needed for wheezing   apixaban (ELIQUIS) 5 mg Unknown  No No   Sig: Take 1 tablet (5 mg total) by mouth 2 (two) times a day   atorvastatin (LIPITOR) 40 mg tablet Unknown  No No   Sig: Take 1 tablet (40 mg total) by mouth every evening   buPROPion (WELLBUTRIN) 100 mg tablet Unknown  Yes No   Sig: Take 100 mg by mouth 2 (two) times a day   cinacalcet (SENSIPAR) 30 mg tablet Unknown  No No   Sig: Take 1 tablet (30 mg total) by mouth daily with breakfast   gabapentin (NEURONTIN) 100 mg capsule Unknown  No No   Sig: Take 1 capsule (100 mg total) by mouth 2 (two) times a day   gabapentin (NEURONTIN) 300 mg capsule Unknown  No No   Sig: Take 1 capsule (300 mg total) by mouth 2 (two) times a day   hydroxychloroquine (PLAQUENIL) 200 mg tablet Unknown  No No   Sig: Take 1 tablet (200mg) every Mon, Tues, Wed, Thurs, and Fri   Take 2 tablets (400mg) every Sat and Sun  Patient taking differently: 200 mg 2 (two) times a day with meals   lamoTRIgine (LaMICtal) 150 MG tablet Unknown  Yes No   Sig: Take 1 tablet by mouth daily at bedtime pt taking 150mg daily at bedtime    lidocaine (LIDODERM) 5 % Unknown  No No   Sig: Apply 1 patch topically over 12 hours daily Remove & Discard patch within 12 hours or as directed by MD Do not start before March 25, 2023  magnesium oxide (MAG-OX) 400 mg tablet Unknown  No No   Sig: Take 2 tablets (800 mg total) by mouth 2 (two) times a day   melatonin 3 mg Unknown  No No   Sig: Take 1 tablet (3 mg total) by mouth daily at bedtime   metoprolol tartrate (LOPRESSOR) 25 mg tablet Unknown  Yes No   Sig: Take 25 mg by mouth every 12 (twelve) hours   morphine (MSIR) 15 mg tablet Unknown  No No   Sig: Take 0 5 tablets (7 5 mg total) by mouth 2 (two) times a day as needed for severe pain or moderate pain Max Daily Amount: 15 mg   pantoprazole (PROTONIX) 40 mg tablet Unknown  No No   Sig: Take 1 tablet (40 mg total) by mouth daily in the early morning Do not start before March 25, 2023     polyethylene glycol (MIRALAX) 17 g packet Unknown  No No   Sig: Take 17 g by mouth daily as needed (Constipation)   Patient not taking: Reported on 4/14/2023   prazosin (MINIPRESS) 1 mg capsule Unknown  No No   Sig: Take 1 capsule (1 mg total) by mouth daily at bedtime   senna-docusate sodium (SENOKOT S) 8 6-50 mg per tablet Unknown  No No   Sig: Take 1 tablet by mouth 2 (two) times a day   spironolactone (ALDACTONE) 25 mg tablet Unknown  No No   Sig: Take 1 tablet (25 mg total) by mouth daily      Facility-Administered Medications: None     Allergies   Allergen Reactions    Cephalosporins Other (See Comments)     rash    Dye [Iodinated Contrast Media] Anaphylaxis     IVP dye    Iodine - Food Allergy Anaphylaxis     Anaphylaxis      Methocarbamol Hives    Sulfa Antibiotics Hives    Bactrim [Sulfamethoxazole-Trimethoprim] Hives    Erythromycin Hives    Penicillins     Gabapentin Rash       Review of previous medical records was  completed  Review of Systems   Constitutional: Negative for chills and fever  HENT: Negative for ear pain and sore throat  Eyes: Positive for pain  Cardiovascular: Negative for chest pain and palpitations  Gastrointestinal: Negative for abdominal pain and vomiting  Skin: Negative for color change and rash  Neurological: Positive for headaches  Negative for tremors  All other systems reviewed and are negative  OBJECTIVE     Patient ID: Brandt Marcos is a 75 y o  female  Vitals:   Vitals:    23 2100 23 2115 23 2130 23   BP: 105/58 105/61 99/61 107/67   Pulse: 78 76 78 76   Resp:    Temp:       TempSrc:       SpO2: 96% 95% 95% 95%   Weight:          Body mass index is 25 11 kg/m²  No intake or output data in the 24 hours ending 23 2307    Temperature:   Temp (24hrs), Av 6 °F (36 4 °C), Min:97 6 °F (36 4 °C), Max:97 6 °F (36 4 °C)    Temperature: 97 6 °F (36 4 °C)    Invasive Devices: Invasive Devices     Peripheral Intravenous Line  Duration           Peripheral IV 23 Left Antecubital <1 day                Physical Exam  Vitals and nursing note reviewed  Constitutional:       General: She is not in acute distress  Appearance: She is well-developed  HENT:      Head: Normocephalic and atraumatic  Eyes:      Conjunctiva/sclera: Conjunctivae normal    Cardiovascular:      Rate and Rhythm: Normal rate  Pulmonary:      Effort: Pulmonary effort is normal    Abdominal:      Palpations: Abdomen is soft  Tenderness: There is no abdominal tenderness  Musculoskeletal:      Cervical back: Neck supple  Skin:     General: Skin is warm  Capillary Refill: Capillary refill takes less than 2 seconds  Neurological:      Mental Status: She is alert     Psychiatric:         Mood and Affect: Mood normal           Neurological Examination:     Mental Status: The patient was awake, alert, attentive, oriented to person, place, and time  Has chronic slurred speech because of previous stroke  No aphasia  Cranial Nerves:   I: smell Not tested   II: visual fields left hemianopia?   Pupils equal, round, reactive to light with normal accomodation  Fundus: benign fundus  III,IV,VI: extraocular muscles EOMI, no nystagmus   V: masseter and pterygoid strength full  Sensation in the V1 through V3 distributions intact to pinprick and light touch bilaterally  VII: Face is symmetric with no weakness noted  VIII: Audition intact to finger rub bilaterally  IX/X: Uvula midline  Soft palate elevation symmetric  XI: Trapezius and SCM strength 5/5 B/L  XII: Tongue midline with no atrophy or fasciculations with appropriate movement  Motor Examination:   No pronator drift  Bulk: Normal  No atrophy Tone: Normal  Fasciculations: None  Deltoid Biceps Triceps WE   WF   FF IO     Right        5         5          5         5      5      5   5        Left           5        5          5          5      5     5   5                       IP        Quad   Ham     TA       Gastroc   Right      5-            5          5         5                5  Left         4             5         5         5                5       Reflexes: 2+     Clonus: None    Coordination: Patient able to perform normal finger-to-nose  Sensory: Normal sensation to light touch    Gait: deferred     LABORATORY DATA     Labs: I have personally reviewed pertinent reports      Results from last 7 days   Lab Units 04/29/23 2044   WBC Thousand/uL 5 59   HEMOGLOBIN g/dL 11 3*   HEMATOCRIT % 34 1*   PLATELETS Thousands/uL 249      Results from last 7 days   Lab Units 04/29/23 2044   POTASSIUM mmol/L 3 8   CHLORIDE mmol/L 108   CO2 mmol/L 31   BUN mg/dL 12   CREATININE mg/dL 1 32*   CALCIUM mg/dL 8 4              Results from last 7 days   Lab Units 04/29/23  2044   INR  1 66* PTT seconds 35               IMAGING & DIAGNOSTIC TESTING     Radiology Results: I have personally reviewed pertinent reports  CT stroke alert brain   Final Result by Moisés Briggs MD (04/29 2040)      Old right MCA distribution infarcts  No evidence of acute intracranial hemorrhage  Chronic microangiopathic changes  Displaced right nasal fractures and nondisplaced left nasal fracture  I personally discussed this study with Dr Juan M Heard on 4/29/2023 8:24 PM             Workstation performed: [id number]         CT facial bones without contrast    (Results Pending)   MRI Inpatient Order    (Results Pending)       Other Diagnostic Testing: I have personally reviewed pertinent reports        ACTIVE MEDICATIONS     Current Facility-Administered Medications   Medication Dose Route Frequency    acetaminophen (TYLENOL) tablet 650 mg  650 mg Oral Q6H PRN    albuterol (PROVENTIL HFA,VENTOLIN HFA) inhaler 2 puff  2 puff Inhalation Q6H PRN    aluminum-magnesium hydroxide-simethicone (MYLANTA) oral suspension 30 mL  30 mL Oral Q6H PRN    apixaban (ELIQUIS) tablet 5 mg  5 mg Oral BID    [START ON 4/30/2023] aspirin chewable tablet 81 mg  81 mg Oral Daily    atorvastatin (LIPITOR) tablet 40 mg  40 mg Oral QPM    buPROPion (WELLBUTRIN) tablet 100 mg  100 mg Oral BID    [START ON 4/30/2023] cinacalcet (SENSIPAR) tablet 30 mg  30 mg Oral Daily With Breakfast    DULoxetine (CYMBALTA) delayed release capsule 60 mg  60 mg Oral BID    gabapentin (NEURONTIN) capsule 100 mg  100 mg Oral BID    gabapentin (NEURONTIN) capsule 300 mg  300 mg Oral BID    [START ON 4/30/2023] hydroxychloroquine (PLAQUENIL) tablet 200 mg  200 mg Oral BID With Meals    lamoTRIgine (LaMICtal) tablet 150 mg  150 mg Oral HS    [START ON 4/30/2023] lidocaine (LIDODERM) 5 % patch 1 patch  1 patch Topical Daily    LORazepam (ATIVAN) tablet 0 25 mg  0 25 mg Oral BID PRN    magnesium Oxide (MAG-OX) tablet 800 mg  800 mg Oral BID    melatonin tablet 3 mg  3 mg Oral HS    metoprolol tartrate (LOPRESSOR) tablet 25 mg  25 mg Oral Q12H [address]    morphine (MSIR) IR tablet 7 5 mg  7 5 mg Oral BID PRN    ondansetron (ZOFRAN) injection 4 mg  4 mg Intravenous Q6H PRN    [START ON 4/30/2023] pantoprazole (PROTONIX) EC tablet 40 mg  40 mg Oral Early Morning    polyethylene glycol (MIRALAX) packet 17 g  17 g Oral Daily PRN    prazosin (MINIPRESS) capsule 1 mg  1 mg Oral HS    senna-docusate sodium (SENOKOT S) 8 6-50 mg per tablet 1 tablet  1 tablet Oral BID    [START ON 4/30/2023] spironolactone (ALDACTONE) tablet 25 mg  25 mg Oral Daily       Prior to Admission medications    Medication Sig Start Date End Date Taking?  Authorizing Provider   acetaminophen (TYLENOL) 325 mg tablet Take 2 tablets (650 mg total) by mouth every 6 (six) hours as needed for mild pain, headaches or fever 2/28/23   LAURA Cardoso   albuterol (PROVENTIL HFA,VENTOLIN HFA) 90 mcg/act inhaler Inhale 2 puffs every 6 (six) hours as needed for wheezing 4/10/23   Bridgett Gunn MD   aluminum-magnesium hydroxide-simethicone (MYLANTA) [id number] mg/30 mL suspension Take 30 mL by mouth every 4 (four) hours as needed for indigestion or heartburn 3/24/23   Nigel Delgado DO   apixaban (ELIQUIS) 5 mg Take 1 tablet (5 mg total) by mouth 2 (two) times a day 4/10/23   Bridgett Gunn MD   atorvastatin (LIPITOR) 40 mg tablet Take 1 tablet (40 mg total) by mouth every evening 4/10/23   Bridgett Gunn MD   buPROPion (WELLBUTRIN) 100 mg tablet Take 100 mg by mouth 2 (two) times a day    Historical Provider, MD   chlorhexidine (PERIDEX) 0 12 % solution USE 1 TABLESPOONFUL IN THE MORNING AND EVENING AFTER MEALS 1/13/23   Historical Provider, MD   cinacalcet (SENSIPAR) 30 mg tablet Take 1 tablet (30 mg total) by mouth daily with breakfast 4/10/23   Bridgett Gunn MD   colchicine (COLCRYS) 0 6 mg tablet TAKE 2 TABLETS INITIALLY, THEN TAKE 1 TABLET IN 1 HOUR 4/15/23   Historical Provider, MD DULOXETINE HCL PO Take 60 mg by mouth 2 (two) times a day    Megan Amado DO   gabapentin (NEURONTIN) 100 mg capsule Take 1 capsule (100 mg total) by mouth 2 (two) times a day 4/14/23   Osiris Tyler PA-C   gabapentin (NEURONTIN) 300 mg capsule Take 1 capsule (300 mg total) by mouth 2 (two) times a day 4/14/23   Osirissrinivasan Tyler PA-C   glycerin-hypromellose- (ARTIFICIAL TEARS) 0 2-0 2-1 % SOLN Administer 1 drop to both eyes every 4 (four) hours as needed (dry eye)  Patient not taking: Reported on 4/14/2023 3/24/23   Otelia Goltz, DO   hydroxychloroquine (PLAQUENIL) 200 mg tablet Take 1 tablet (200mg) every Mon, Tues, Wed, Thurs, and Fri  Take 2 tablets (400mg) every Sat and Sun  12/29/22 6/28/23  Alicia Jones MD   indomethacin (INDOCIN) 25 mg capsule TAKE ONE CAPSULE BY MOUTH EVERY 8 HOURS WITH FOOD AS NEEDED FOR GOUT PAIN    DO NOT TAKE ON THE SAME DAY AS COLCHICINE 4/15/23   Historical Provider, MD   lamoTRIgine (LaMICtal) 150 MG tablet Take 1 tablet by mouth daily at bedtime pt taking 150mg daily at bedtime     Historical Provider, MD   lidocaine (LIDODERM) 5 % Apply 1 patch topically over 12 hours daily Remove & Discard patch within 12 hours or as directed by MD Do not start before March 25, 2023  3/25/23   Otelia Goltz, DO   LORazepam (ATIVAN) 0 5 mg tablet Take 0 5 tablets (0 25 mg total) by mouth 2 (two) times a day as needed for anxiety 3/24/23   Otelia Goltz, DO   magnesium oxide (MAG-OX) 400 mg tablet Take 2 tablets (800 mg total) by mouth 2 (two) times a day 4/10/23   Quinton Guerrero MD   melatonin 3 mg Take 1 tablet (3 mg total) by mouth daily at bedtime 4/10/23   Quinton Guerrero MD   menthol-methyl salicylate (BENGAY) 30-91 % cream Apply topically 4 (four) times a day as needed (pain)  Patient not taking: Reported on 4/14/2023 3/24/23   Otelia Goltz, DO   metoprolol succinate (TOPROL-XL) 25 mg 24 hr tablet Take 1 tablet (25 mg total) by mouth 2 (two) times a day 4/10/23 Laura Godinez MD   morphine (MSIR) 15 mg tablet Take 0 5 tablets (7 5 mg total) by mouth 2 (two) times a day as needed for severe pain or moderate pain Max Daily Amount: 15 mg 3/24/23   Selene Caul, DO   ondansetron (ZOFRAN-ODT) 4 mg disintegrating tablet Take 1 tablet (4 mg total) by mouth every 6 (six) hours as needed for nausea or vomiting 3/24/23   Selene Caul, DO   oxygen gas Inhale 2 L/min daily at bedtime as needed (dyspnea)  Indications: Difficulty Breathing 4/9/23   Historical Provider, MD   pantoprazole (PROTONIX) 40 mg tablet Take 1 tablet (40 mg total) by mouth daily in the early morning Do not start before March 25, 2023  3/25/23   Selene Caul, DO   polyethylene glycol (MIRALAX) 17 g packet Take 17 g by mouth daily as needed (Constipation)  Patient not taking: Reported on 4/14/2023 3/8/22   Jasmyne Gonzalez PA-C   prazosin (MINIPRESS) 1 mg capsule Take 1 capsule (1 mg total) by mouth daily at bedtime 3/24/23   Selene Caul, DO   senna-docusate sodium (SENOKOT S) 8 6-50 mg per tablet Take 1 tablet by mouth 2 (two) times a day 4/10/23   Laura Godinez MD   sodium chloride (OCEAN) 0 65 % nasal spray 1 spray into each nostril every hour as needed for congestion 3/24/23   Selene Caul, DO   spironolactone (ALDACTONE) 25 mg tablet Take 1 tablet (25 mg total) by mouth daily 4/18/23 8/16/23  Earnestine Mendoza MD   white petrolatum-mineral oil (EUCERIN,HYDROCERIN) cream Apply topically 3 (three) times a day And feet 3/24/23   Selene Caul, DO         CODE STATUS & ADVANCED DIRECTIVES     Code Status: Level 1 - Full Code  Advance Directive and Living Will:      Power of :    POLST:        VTE Pharmacologic Prophylaxis: Apixaban (Eliquis)     VTE Mechanical Prophylaxis: sequential compression device    ==  MD Raiza Mcclure's Neurology Residency, PGY-4

## 2023-04-30 NOTE — PLAN OF CARE
Problem: PHYSICAL THERAPY ADULT  Goal: Performs mobility at highest level of function for planned discharge setting  See evaluation for individualized goals  Description: Treatment/Interventions: Functional transfer training, LE strengthening/ROM, Elevations, Therapeutic exercise, Endurance training, Patient/family training, Equipment eval/education, Bed mobility, Gait training          See flowsheet documentation for full assessment, interventions and recommendations  Note: Prognosis: Good  Problem List: Decreased strength, Decreased endurance, Impaired balance, Decreased mobility, Decreased safety awareness, Pain  Assessment: Pt is a 75 y o  female seen for PT evaluation s/p admit to [facility] on 4/29/2023  Pt was admitted with a primary dx of: Worsening L sided weakness and facial droop  CTH: Old R MCA distribution infarcts  No acute ICH  Displaced R nasal fractures and nondisplaced L nasal fracture  MRI pending  PT now consulted for assessment of mobility and d/c needs  Pt with Up and OOB as tolerated orders  Pts current comorbidities and personal factors effecting treatment include: HTN, Bipolar disorder, RA, Asthma, CVA in Feb 2022, Anxiety  Pts current clinical presentation is Unstable/Unpredictable (high complexity) due to Ongoing medical management for primary dx, Decreased activity tolerance compared to baseline, Fall risk, Increased assistance needed from caregiver at current time, Diagnostic imaging pending  Prior to admission, pt was independent with use of RW  Upon evaluation, pt currently is requiring  Supervision for transfers and Supervision for ambulation 60 ft w/ RW  Pt presents at PT eval functioning below baseline and currently w/ overall mobility deficits 2* to: impaired balance, gait deviations, pain, decreased activity tolerance compared to baseline, decreased functional mobility tolerance compared to baseline, decreased safety awareness, fall risk   Pt currently at a fall risk 2* to impairments listed above  Pt will continue to benefit from skilled acute PT interventions to address stated impairments; to maximize functional mobility; for ongoing pt/ family training; and DME needs  At conclusion of PT session pt returned back in chair and chair alarm engaged with phone and call bell within reach  Pt denies any further questions at this time  Recommend home with family care and HHPT upon hospital D/C  PT Discharge Recommendation: Home with home health rehabilitation (resume HHPT services)    See flowsheet documentation for full assessment

## 2023-04-30 NOTE — PROGRESS NOTES
/64 HR 78 Pt states in ED worried about a drop in BP, notified medical team of patients concerns okay to hold BP meds tonight   Metoprolol and minipress held tonight

## 2023-04-30 NOTE — ASSESSMENT & PLAN NOTE
· BP acceptably, monitor routinely   · Continue PTA Minipress 1 mg at bedtime, Aldactone 25 mg daily, Metoprolol tartrate 25 mg every 12 hours   · Patient claims she had low blood pressures at home   · Check orthostatics

## 2023-04-30 NOTE — SPEECH THERAPY NOTE
Speech Language/Pathology  Order received, chart reviewed- pt screened at bedside  Pt eats soft foods and thickens liquids w/ meals at home  Follows free water protocol at home  On regular diet here- tolerated breakfast well this morning (scrambled eggs, yogurt)  Plan to f/u w/ lunch tray today

## 2023-05-01 ENCOUNTER — APPOINTMENT (INPATIENT)
Dept: RADIOLOGY | Facility: HOSPITAL | Age: 68
End: 2023-05-01

## 2023-05-01 ENCOUNTER — APPOINTMENT (INPATIENT)
Dept: NEUROLOGY | Facility: CLINIC | Age: 68
End: 2023-05-01

## 2023-05-01 VITALS
OXYGEN SATURATION: 92 % | RESPIRATION RATE: 16 BRPM | WEIGHT: 125.2 LBS | BODY MASS INDEX: 22.18 KG/M2 | HEART RATE: 87 BPM | TEMPERATURE: 97.8 F | SYSTOLIC BLOOD PRESSURE: 102 MMHG | DIASTOLIC BLOOD PRESSURE: 63 MMHG

## 2023-05-01 PROBLEM — R29.90 STROKE-LIKE SYMPTOMS: Status: ACTIVE | Noted: 2023-05-01

## 2023-05-01 PROBLEM — R06.89 ACUTE RESPIRATORY INSUFFICIENCY: Status: ACTIVE | Noted: 2023-05-01

## 2023-05-01 LAB
AMORPH URATE CRY URNS QL MICRO: ABNORMAL
ANION GAP SERPL CALCULATED.3IONS-SCNC: 1 MMOL/L (ref 4–13)
ARTERIAL PATENCY WRIST A: YES
BACTERIA UR QL AUTO: ABNORMAL /HPF
BASE EXCESS BLDA CALC-SCNC: 1 MMOL/L
BILIRUB UR QL STRIP: NEGATIVE
BUN SERPL-MCNC: 9 MG/DL (ref 5–25)
CALCIUM SERPL-MCNC: 8.5 MG/DL (ref 8.3–10.1)
CHLORIDE SERPL-SCNC: 115 MMOL/L (ref 96–108)
CLARITY UR: ABNORMAL
CO2 SERPL-SCNC: 27 MMOL/L (ref 21–32)
COLOR UR: YELLOW
CREAT SERPL-MCNC: 1.05 MG/DL (ref 0.6–1.3)
ERYTHROCYTE [DISTWIDTH] IN BLOOD BY AUTOMATED COUNT: 13.1 % (ref 11.6–15.1)
GFR SERPL CREATININE-BSD FRML MDRD: 54 ML/MIN/1.73SQ M
GLUCOSE SERPL-MCNC: 106 MG/DL (ref 65–140)
GLUCOSE SERPL-MCNC: 112 MG/DL (ref 65–140)
GLUCOSE UR STRIP-MCNC: NEGATIVE MG/DL
HCO3 BLDA-SCNC: 25 MMOL/L (ref 22–28)
HCT VFR BLD AUTO: 33.6 % (ref 34.8–46.1)
HGB BLD-MCNC: 10.4 G/DL (ref 11.5–15.4)
HGB UR QL STRIP.AUTO: NEGATIVE
HYALINE CASTS #/AREA URNS LPF: ABNORMAL /LPF
KETONES UR STRIP-MCNC: NEGATIVE MG/DL
LEUKOCYTE ESTERASE UR QL STRIP: ABNORMAL
MCH RBC QN AUTO: 31.1 PG (ref 26.8–34.3)
MCHC RBC AUTO-ENTMCNC: 31 G/DL (ref 31.4–37.4)
MCV RBC AUTO: 101 FL (ref 82–98)
MUCOUS THREADS UR QL AUTO: ABNORMAL
NASAL CANNULA: 3
NITRITE UR QL STRIP: NEGATIVE
NON-SQ EPI CELLS URNS QL MICRO: ABNORMAL /HPF
O2 CT BLDA-SCNC: 15.6 ML/DL (ref 16–23)
OXYHGB MFR BLDA: 96.5 % (ref 94–97)
PCO2 BLDA: 37.5 MM HG (ref 36–44)
PH BLDA: 7.44 [PH] (ref 7.35–7.45)
PH UR STRIP.AUTO: 7.5 [PH]
PLATELET # BLD AUTO: 199 THOUSANDS/UL (ref 149–390)
PMV BLD AUTO: 11 FL (ref 8.9–12.7)
PO2 BLDA: 92 MM HG (ref 75–129)
POTASSIUM SERPL-SCNC: 4.1 MMOL/L (ref 3.5–5.3)
PROCALCITONIN SERPL-MCNC: <0.05 NG/ML
PROT UR STRIP-MCNC: ABNORMAL MG/DL
RBC # BLD AUTO: 3.34 MILLION/UL (ref 3.81–5.12)
RBC #/AREA URNS AUTO: ABNORMAL /HPF
SODIUM SERPL-SCNC: 143 MMOL/L (ref 135–147)
SP GR UR STRIP.AUTO: 1.02 (ref 1–1.03)
SPECIMEN SOURCE: ABNORMAL
UROBILINOGEN UR STRIP-ACNC: <2 MG/DL
WBC # BLD AUTO: 4.17 THOUSAND/UL (ref 4.31–10.16)
WBC #/AREA URNS AUTO: ABNORMAL /HPF

## 2023-05-01 RX ORDER — NITROFURANTOIN 25; 75 MG/1; MG/1
100 CAPSULE ORAL 2 TIMES DAILY
Qty: 10 CAPSULE | Refills: 0 | Status: SHIPPED | OUTPATIENT
Start: 2023-05-01 | End: 2023-05-06

## 2023-05-01 RX ADMIN — GABAPENTIN 100 MG: 100 CAPSULE ORAL at 09:53

## 2023-05-01 RX ADMIN — MAGNESIUM OXIDE TAB 400 MG (241.3 MG ELEMENTAL MG) 800 MG: 400 (241.3 MG) TAB at 09:47

## 2023-05-01 RX ADMIN — MORPHINE SULFATE 7.5 MG: 15 TABLET ORAL at 09:46

## 2023-05-01 RX ADMIN — HYDROXYCHLOROQUINE SULFATE 200 MG: 200 TABLET ORAL at 09:47

## 2023-05-01 RX ADMIN — DULOXETINE HYDROCHLORIDE 60 MG: 60 CAPSULE, DELAYED RELEASE ORAL at 09:46

## 2023-05-01 RX ADMIN — CINACALCET 30 MG: 30 TABLET, FILM COATED ORAL at 09:47

## 2023-05-01 RX ADMIN — ASPIRIN 81 MG 81 MG: 81 TABLET ORAL at 09:46

## 2023-05-01 RX ADMIN — BUPROPION HYDROCHLORIDE TABLETS 100 MG: 100 TABLET, FILM COATED ORAL at 09:48

## 2023-05-01 RX ADMIN — GABAPENTIN 300 MG: 300 CAPSULE ORAL at 09:46

## 2023-05-01 RX ADMIN — PANTOPRAZOLE SODIUM 40 MG: 40 TABLET, DELAYED RELEASE ORAL at 05:17

## 2023-05-01 RX ADMIN — APIXABAN 5 MG: 5 TABLET, FILM COATED ORAL at 09:46

## 2023-05-01 RX ADMIN — SENNOSIDES AND DOCUSATE SODIUM 1 TABLET: 8.6; 5 TABLET ORAL at 09:47

## 2023-05-01 NOTE — QUICK NOTE
EEG showed: continuous theta activity over the right hemisphere and attenuation of beta activity over the right frontal region  These findings indicate cerebral dysfunction over the right hemisphere, maximal over the right frontal region, likely related to underlying structural lesion  Impression: Noted EEG findings were in area where patient had previous stroke   Possibly recrudescence of would warrant r/o infectious causes      Plan  Will not initiate any AEDs  F/u urine studies and consider COVID test given had episode of hypoxia  Continue to f/u w/ neurovascular team Aroldo Guzmán or Attending/AP/resident in 8-10 weeks

## 2023-05-01 NOTE — PLAN OF CARE
Problem: Knowledge Deficit  Goal: Patient/family/caregiver demonstrates understanding of disease process, treatment plan, medications, and discharge instructions  Description: Complete learning assessment and assess knowledge base  Interventions:  - Provide teaching at level of understanding  - Provide teaching via preferred learning methods  Outcome: Progressing     Problem: NEUROSENSORY - ADULT  Goal: Achieves stable or improved neurological status  Description: INTERVENTIONS  - Monitor and report changes in neurological status  - Monitor vital signs such as temperature, blood pressure, glucose, and any other labs ordered   - Initiate measures to prevent increased intracranial pressure  - Monitor for seizure activity and implement precautions if appropriate      Outcome: Progressing  Goal: Achieves maximal functionality and self care  Description: INTERVENTIONS  - Monitor swallowing and airway patency with patient fatigue and changes in neurological status  - Encourage and assist patient to increase activity and self care     - Encourage visually impaired, hearing impaired and aphasic patients to use assistive/communication devices  Outcome: Progressing     Problem: CARDIOVASCULAR - ADULT  Goal: Maintains optimal cardiac output and hemodynamic stability  Description: INTERVENTIONS:  - Monitor I/O, vital signs and rhythm  - Monitor for S/S and trends of decreased cardiac output  - Administer and titrate ordered vasoactive medications to optimize hemodynamic stability  - Assess quality of pulses, skin color and temperature  - Assess for signs of decreased coronary artery perfusion  - Instruct patient to report change in severity of symptoms  Outcome: Progressing  Goal: Absence of cardiac dysrhythmias or at baseline rhythm  Description: INTERVENTIONS:  - Continuous cardiac monitoring, vital signs, obtain 12 lead EKG if ordered  - Administer antiarrhythmic and heart rate control medications as ordered  - Monitor electrolytes and administer replacement therapy as ordered  Outcome: Progressing     Problem: GASTROINTESTINAL - ADULT  Goal: Maintains adequate nutritional intake  Description: INTERVENTIONS:  - Monitor percentage of each meal consumed  - Identify factors contributing to decreased intake, treat as appropriate  - Assist with meals as needed  - Monitor I&O, weight, and lab values if indicated  - Obtain nutrition services referral as needed  Outcome: Progressing

## 2023-05-01 NOTE — ASSESSMENT & PLAN NOTE
Noted overnight and requiring 3 L NC, weaned to room air this AM  Patient reports chronic history of nocturna hypoxia and uses supplemental oxygen at baseline     · CXR: No acute cardiopulmonary disease  · Given nocturnal hypoxia would recommend outpatient sleep study   · Modified diet per speech, continue aspiration precautions   · Ambulatory pulse ox prior to discharge - acceptable

## 2023-05-01 NOTE — SPEECH THERAPY NOTE
"Speech-Language Pathology Bedside Swallow Evaluation      Patient Name: Abelino Mcgrath    [id number] Date: 5/1/2023     Problem List  Principal Problem:    Acute CVA (cerebrovascular accident) [facility])  Active Problems:    Mild intermittent asthma without complication    Bipolar depression ([facility] )    Rheumatoid arthritis ([facility] )    HTN (hypertension)      Past Medical History  Past Medical History:   Diagnosis Date    Anxiety     Asthma     [facility]     Chronic narcotic dependence [facility]     Chronic pain     Depression     Enterovirus heart infection     SHAR (obstructive sleep apnea)     Osteoarthritis     Peripheral neuropathy     Plantar fasciitis of right foot     Senile osteoporosis     Seronegative arthropathy of multiple sites ([facility]     Undifferentiated connective tissue disease ([facility] )        Past Surgical History  Past Surgical History:   Procedure Laterality Date    BREAST IMPLANT      FIXATION KYPHOPLASTY LUMBAR SPINE      HYSTERECTOMY      INCONTINENCE SURGERY N/A     IR STROKE ALERT  2/12/2023    NASAL SEPTOPLASTY W/ TURBINOPLASTY N/A     RECTAL PROLAPSE REPAIR N/A     REPAIR RECTOCELE      RHINOPLASTY N/A        Summary   Pt presented with s/s suggestive of moderate oral and suspected moderate pharyngeal dysphagia  The patient is assessed with puree and regular solids with nectar thick and thin liquids  Bite strength is reduced, with more of a \"ripping\" motion  Mastication time is prolonged  A small amount of anterior leakage observed on left  No pocketing observed  Suspect patient has decreased oral control with liquids  Swallow initiation time is suspected to be delayed  Swallows are audible, with intermittent coughing  Multiple swallows are required for small amounts of liquids  Per patient, she is on a soft diet with nectar thick liquids at home  She is allowed to have thin liquids, per the free water protocol   She reports an episode of regurgitation with food, which " rarely happens  The patient denies mucous production or expectoration  Risk/s for Aspiration: high due to repeat CVA's and history of dysphagia and aspiration      Recommended Diet: mechanically altered/level 2 diet and nectar thick liquids   Recommended Form of Meds: crushed with puree   Aspiration precautions and swallowing strategies: small bites/sips and no straws  Other Recommendations: Continue frequent oral care  *patient may have thin liquids in between meals with good oral care*      Current Medical Status  39-year-old female with a past medical history of ischemic stroke and hemorrhagic stroke on 2/12/2023  Patient is on Eliquis and has been taking it  Patient admits she had a fall this morning she felt unsteady in the bathroom  She denies head strike  She normally ambulates with a walker  Family is concerned that she has worsening left facial droop and left-sided weaknesses  She also complains of headache behind her right eye and left arm pain  Was made a stroke alert by EMS  She was evaluated and brought to CT scan  Current Precautions:  Fall  Aspiration  Contact    Allergies:  iodine  Past medical history:  Please see H&P for details    Special Studies:  VBS 3/9/2023:   Oral stage:  Pt presented with moderate oral stage dysphagia    Lip seal is noted to be mildly decreased around tsp, leading to inconsistent trace anterior loss on initial tsp presentations but likely due to increased xerostomia which was observed  Bolus control of thin and NT liquids by tsp, cup and straw was observed to be decreased, leading to premature spill to pharynx prior to swallow  Bolus manipulation is delayed given puree noting spill to pharynx prior to swallow  Mastication was PROLONGED with soft solids (both banana and peaches) administered today   Bolus formation given soft solids was observed to be decreased to where despite prolonged mastication given these textures overall effectiveness in breakdown/formulation was decreased noting that pt did swallow banana >peaches more so whole  Pt did have an episode of brief nasal regurgitation x1 w/ NT by tsp     Pharyngeal stage:  Pt presented with moderate-severe pharyngeal dysphagia     Transition between with oral and pharyngeal stages was moderately delayed w/ puree, NT and thin textures administered  Hyolaryngeal excursion was decreased given all consistencies  It was observed that BOT retractions is weaker which it was observed that pt did have fairly consistent retention given puree and soft textures  Also observed during assessment was weakened/decreased pharyngeal constriction across textures which lead to moderate amount of pharyngeal retention (more vallecular vs pyriform sinus retention)  Pt did also present w/ trace to minimal PPW w/ all consistencies observed  While pt's epiglottis was inverting during swallow, overall timing given airway protection during swallows more w/ NT and thins was decreased  Pt was observed w/ sluggish movement/return of epiglottis intermittently during assessment  It was observed that pt did have a cricopharyngeal prominence around to C5-C6 level which did NOT impinge passage of boluses  Unfortunately, pt did have increased laryngeal penetration which ranged from high level but then deep to the level of the vocal cords as well as then mostly silent aspiration events w/ thin liquids but also NT by straw  Refer to below for observations       Social/Education/Vocational Hx:  Pt lives with family    Swallow Information   Current Risks for Dysphagia & Aspiration: CVA, known history of dysphagia, known history of aspiration and dysarthria  Current Symptoms/Concerns: coughing with po  Current Diet: regular diet and thin liquids   Baseline Diet: soft/level 3 diet and nectar thick liquids    Baseline Assessment   Behavior/Cognition: alert  Speech/Language Status: able to participate in conversation and able to follow commands  Patient Positioning: upright in chair  Pain Status/Interventions/Response to Interventions: No report of or nonverbal indications of pain  Swallow Mechanism Exam  Facial: left facial droop  Labial: WFL  Lingual: WFL  Velum: symmetrical  Mandible: adequate ROM  Dentition: adequate  Vocal quality:weak   Respiratory Status: on 2/3 L O2      Consistencies Assessed and Performance   Consistencies Administered: thin liquids, nectar thick, puree and hard solids  Materials administered included oatmeal and toast with nectar thick and thin liquids     Oral Stage: moderate  Bite strength is reduced  Mastication time is prolonged with regular solids  Some anterior leakage observed on left  Suspect patient has decreased oral control with liquids, with spillage over BOT  No significant oral residue observed  Pharyngeal Stage: moderate  Suspect swallow initiation time is delayed  Swallow is audible and multiple swallows are needed to clear small amounts  Intermittent cough observed across liquid trials  Laryngeal rise is palpated and appears adequate  Esophageal Concerns: none reported    Summary and Recommendations (see above)    Results Reviewed with: patient, RN and PA     Treatment Recommended: dysphagia therapy      Frequency of treatment: 2-3x week, as able     Patient Stated Goal: none stated     Dysphagia LTG  -Patient will demonstrate safe and effective oral intake (without overt s/s significant oral/pharyngeal dysphagia including s/s penetration or aspiration) for the highest appropriate diet level       Short Term Goals:  -Pt will tolerate Dysphagia 2/mechanical soft diet and nectar thick liquid with no significant s/s oral or pharyngeal dysphagia across 1-3 diagnostic session/s     -Patient will tolerate trials of upgraded food and/or liquid texture with no significant s/s of oral or pharyngeal dysphagia including aspiration across 1-3 diagnostic sessions     Speech Therapy Prognosis   Prognosis: fair    Prognosis Considerations: age, medical status and prior medical history

## 2023-05-01 NOTE — APP STUDENT NOTE
SILVINA STUDENT  Inpatient Progress Note for TRAINING ONLY  Not Part of Legal Medical Record     Progress Note - Myrna Tran 75 y o  female MRN: [id number]  Unit/Bed#: [facility] 711-01 Encounter: [id number]        No new Assessment & Plan notes have been filed under this hospital service since the last note was generated  Service: Hospitalist    Stroke-like symptoms  Patient's son noticed pt to have worsening left-sided facial droop  Note recent admission and discharge for stroke 2/28/23-3/25/23  Consider worsening symptoms as secondary to hypotension that pt noted while at home  Thrombolysis contraindicated at that time d/t ongoing Eliquis and nondisabling sx   - Initial CT head on admission shows Old right MCA distribution infarcts  - F/u MRI brain in unremarkable for an acute vascular infarct, but does show Evolving right MCA distribution infarct with associated chronic hemosiderin deposition  No acute Intracranial hemorrhage   - MRA carotids unremarkable  - MRA head shows 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, stable and  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    - Neurovascular f/u recommended on nonemergent basis  - Neurology following  Appreciate guidance  - Pending EEG   - Continue home Eliquis    - Hold ASA and statin  - PT recommends d/c to home with family care and HHPT upon hospital d/c  Continue PT/OT while active admission   - Appreciate speech therapy recs   - Level 2 diet and nectar thick liquids    - Crush meds with puree   - Dysphagia therapy recommended    Respiratory Failure  - SpO2 was noted to be 84% 4/30, which has normalized to 98% today  Patient notes that after taking ativan yesterday, she found it hard to breathe    - Pt currently on supportive O2 with NC   - Start to wean off O2 from 2L->1L->RA  - Continue to monitor spO2    HTN  - Patient states she was hypotensive at home  - BP is currently lower than baseline  - Continue PTA Minipress 1 mg at bedtime, Aldactone 25 mg daily, Metoprolol tartrate 25 mg every 12 hours   -decrease metoprolol to 12 5mg  - Monitor routine BP's    Anemia  - Hgb downtrending 11 3->10 7->10 4  - Monitor Hgb and hemodynamics    Rheumatoid Arthritis  Chronic condition, following outpatient with rheumatologist  -continue Plaquenil 200 mg bid, neurontin 400 mg bid  Bipolar Depression  - Patient took ativan last night d/t claustrophobic sensation  As per nursing staff, patient seemed to be more sleepy than usual and was hard to wake up after dose  - Continue Wellbutrin 100 mg bid, cymbalta 60 mg bid, ativan 0 25 mg twice daily prn      H/o nasal bone fracture  Pt with old nasal bone fracture with depression on the right side  Pt reports some difficulty with nasal breathing and would like it to be addressed in the future  - OMFS indicated that no surgical intervention is needed at this time     - Recommended outpt f/u for right nasal bone deformity and B/L neck mass with Dr Payal Liu   - No abx recommended    Mild intermittent asthma without complication  Pt without signs of respiratory distress or asthma exacerbation  - Albuterol prn    CRE infection  - Positive urine culture for CRE on 3/29/2023   - Patient on contact precautions    VTE Pharmacologic Prophylaxis:   Pharmacologic: {VTE Prophylaxis Meds:15900}  Mechanical VTE Prophylaxis in Place: {Yes or No:69912}    Patient Centered Rounds: {Patient Centered Rounds:20782}    Discussions with Specialists or Other Care Team Provider: ***    Education and Discussions with Family / Patient: ***    Time Spent for Care: {Time; Time 15 min - 1 hour:23260}  More than 50% of total time spent on counseling and coordination of care as described above      Current Length of Stay: 2 day(s)    Current Patient Status: Inpatient   Certification Statement: {Certification CLQBOHIZB:89796}    Discharge Plan: ***    Code Status: Level 1 - Full Code    Subjective:   Patient seen sitting out of bed in chair  States she feels much better and back to her baseline today  Feels improvement with speech as well  States that she had an MRI done yesterday, before which she took Ativan d/t claustrophobia  However, she became close to unconscious and had trouble breathing following administration, but she states that she has fully recovered from that  She does admit to feeling generalized weakness, but this has been ongoing for the past few days  Admits to having unintentional weight loss over the past few months, which she attributes to lack of activity and decreased appetite  States that she is not actively infectious, given her contact precautions  States she was treated for positive urine cultures about a month ago  Denies HA, lightheadedness, n/v, SOB, Chest pain, abdominal pain, dysuria  Objective:     Vitals:   Temp (24hrs), Av 6 °F (36 4 °C), Min:97 4 °F (36 3 °C), Max:98 °F (36 7 °C)    Temp:  [97 4 °F (36 3 °C)-98 °F (36 7 °C)] 97 5 °F (36 4 °C)  HR:  [] 79  Resp:  [18] 18  BP: ()/(59-78) 103/62  SpO2:  [84 %-99 %] 98 %  Body mass index is 22 18 kg/m²  Input and Output Summary (last 24 hours): Intake/Output Summary (Last 24 hours) at 2023 1038  Last data filed at 2023 1430  Gross per 24 hour   Intake 300 ml   Output --   Net 300 ml       Physical Exam:     Physical Exam  Constitutional:       Appearance: Normal appearance  HENT:      Head: Normocephalic and atraumatic  Cardiovascular:      Rate and Rhythm: Normal rate and regular rhythm  Pulses: Normal pulses  Heart sounds: Normal heart sounds  No murmur heard  Pulmonary:      Effort: Pulmonary effort is normal  No respiratory distress  Breath sounds: Normal breath sounds  No rales  Abdominal:      General: Abdomen is flat  Bowel sounds are normal  There is no distension  Palpations: Abdomen is soft  Tenderness: There is no abdominal tenderness  Musculoskeletal:         General: No tenderness  Normal range of motion  Right lower leg: No edema  Left lower leg: No edema  Skin:     General: Skin is warm  Neurological:      General: No focal deficit present  Mental Status: She is alert and oriented to person, place, and time  Motor: Weakness present  Comments: Decreased  strength of left UE     Psychiatric:         Mood and Affect: Mood normal          Behavior: Behavior normal        ( *** Be Sure to Include Physical Exam: Delete this entire line when you have entered your exam)    Historical Information   Past Medical History:   Diagnosis Date    Anxiety     Asthma     Bipolar depression ([facility] )     Chronic narcotic dependence ([facility]     Chronic pain     Depression     Enterovirus heart infection     SHAR (obstructive sleep apnea)     Osteoarthritis     Peripheral neuropathy     Plantar fasciitis of right foot     Senile osteoporosis     Seronegative arthropathy of multiple sites ([facility] )     Undifferentiated connective tissue disease ([facility])      Past Surgical History:   Procedure Laterality Date    BREAST IMPLANT      FIXATION KYPHOPLASTY LUMBAR SPINE      HYSTERECTOMY      INCONTINENCE SURGERY N/A     IR STROKE ALERT  2/12/2023    NASAL SEPTOPLASTY W/ TURBINOPLASTY N/A     RECTAL PROLAPSE REPAIR N/A     REPAIR RECTOCELE      RHINOPLASTY N/A      Social History   Social History     Substance and Sexual Activity   Alcohol Use Not Currently    Comment: quit     Social History     Substance and Sexual Activity   Drug Use Not Currently    Types: Marijuana    Comment: has medical card     Social History     Tobacco Use   Smoking Status Former   Smokeless Tobacco Never     Family History: {MICHAEL TONG FAM HISTORY SMARTLIST:444571256}    Meds/Allergies   {MICHAEL TONG H&P BOSF:155700955}  Allergies   Allergen Reactions    Cephalosporins Other (See Comments)     rash    Dye [Iodinated Contrast Media] Anaphylaxis     IVP dye    Iodine - Food Allergy Anaphylaxis     Anaphylaxis      Methocarbamol Hives    Sulfa Antibiotics Hives    Bactrim [Sulfamethoxazole-Trimethoprim] Hives    Erythromycin Hives    Penicillins     Gabapentin Rash       Additional Data:     Labs:    Results from last 7 days   Lab Units 05/01/23  0530 04/30/23  0435   WBC Thousand/uL 4 17* 4 56   HEMOGLOBIN g/dL 10 4* 10 7*   HEMATOCRIT % 33 6* 32 9*   PLATELETS Thousands/uL 199 225   NEUTROS PCT %  --  58   LYMPHS PCT %  --  30   MONOS PCT %  --  11   EOS PCT %  --  0     Results from last 7 days   Lab Units 05/01/23  0530 04/30/23  0435   SODIUM mmol/L 143 142   POTASSIUM mmol/L 4 1 3 3*   CHLORIDE mmol/L 115* 112*   CO2 mmol/L 27 27   BUN mg/dL 9 9   CREATININE mg/dL 1 05 1 04   ANION GAP mmol/L 1* 3*   CALCIUM mg/dL 8 5 8 4   ALBUMIN g/dL  --  2 6*   TOTAL BILIRUBIN mg/dL  --  0 54   ALK PHOS U/L  --  52   ALT U/L  --  14   AST U/L  --  17   GLUCOSE RANDOM mg/dL 106 79     Results from last 7 days   Lab Units 04/29/23  2044   INR  1 66*     Results from last 7 days   Lab Units 05/01/23  0301 04/29/23  2023   POC GLUCOSE mg/dl 112 126                 * I Have Reviewed All Lab Data Listed Above    * Additional Pertinent Lab Tests Reviewed: {Labreview:64622}    Imaging:    Imaging Reports Reviewed Today Include: ***  Imaging Personally Reviewed by Myself Includes:  ***    Recent Cultures (last 7 days):           Last 24 Hours Medication List:   Current Facility-Administered Medications   Medication Dose Route Frequency Provider Last Rate    acetaminophen  650 mg Oral Q6H PRN Lesly Hartman MD      albuterol  2 puff Inhalation Q6H PRN Lesly Hartman MD      aluminum-magnesium hydroxide-simethicone  30 mL Oral Q6H PRN Lesly Hartman MD      apixaban  5 mg Oral BID Lesly Hartman MD      aspirin  81 mg Oral Daily Lesly Hartman MD      atorvastatin  40 mg Oral QPM Ezio Turner Caryle Sink, MD      buPROPion  100 mg Oral BID Rubi Hernadez MD      cinacalcet  30 mg Oral Daily With Breakfast Georgina Stuart MD      DULoxetine  60 mg Oral BID Rubi Hernadez MD      gabapentin  100 mg Oral BID Rubi Hernadez MD      gabapentin  300 mg Oral BID Rubi Hernadez MD      hydroxychloroquine  200 mg Oral BID With Meals Rubi Hernadez MD      lamoTRIgine  150 mg Oral HS Rubi Hernadez MD      lidocaine  1 patch Topical Daily Rubi Hernadez MD      LORazepam  0 5 mg Oral Once PRN Rochester Moritz, DO      magnesium Oxide  800 mg Oral BID Rubi Hernadez MD      melatonin  3 mg Oral HS Rubi Hernadez MD      metoclopramide  10 mg Intravenous Q6H PRN Stephanie Riley PA-C      metoprolol tartrate  25 mg Oral Q12H [address] Rubi Hernadez MD      morphine  7 5 mg Oral BID PRN Rubi Hernadez MD      ondansetron  4 mg Intravenous Q6H PRN Rubi Hernadez MD      pantoprazole  40 mg Oral Early Morning Rubi Hernadez MD      polyethylene glycol  17 g Oral Daily PRN Rubi Hernadez MD      prazosin  1 mg Oral HS Rubi Hernadez MD      senna-docusate sodium  1 tablet Oral BID Rubi Hernadez MD      spironolactone  25 mg Oral Daily Rubi Hernadez MD          Today, Patient Was Seen By: Brigid Singh    ** Please Note: Dictation voice to text software may have been used in the creation of this document   **

## 2023-05-01 NOTE — ASSESSMENT & PLAN NOTE
· Appreciate speech therapy evaluation   · Recommended level 2 dysphagia diet with nectar thick liquids   · Aspiration precautions

## 2023-05-01 NOTE — RESTORATIVE TECHNICIAN NOTE
Restorative Technician Note      Patient Name: Feliciano Johnson     Note Type: Mobility  Patient Position Upon Consult: Supine  Activity Performed: Ambulated; [id number]; Stood  Assistive Device: Roller walker  Education Provided: Yes  Patient Position at [employer] of Consult: Bedside chair;  All needs within reach; Bed/Chair alarm activated    Mushtaq BAPTISTE, Restorative Technician, [employer]

## 2023-05-01 NOTE — CASE MANAGEMENT
Case Management Assessment & Discharge Planning Note    Patient name Abelino Mcgrath  Location [address] [place] [address] [address] 616-88 MRN [id number]  : 1955 Date 2023       Current Admission Date: 2023  Current Admission Diagnosis:Acute CVA (cerebrovascular accident) [facility])   Patient Active Problem List    Diagnosis Date Noted    Acute respiratory insufficiency 2023    Stroke-like symptoms 2023    Insomnia 2023    Hypomagnesemia 2023    Sinus tachycardia 2023    Hypokalemia 03/10/2023    AMS (altered mental status) 2023    Gout of foot 2023    Dysphagia 2023    Wound of left ankle 2023    UTI (urinary tract infection) 2023    Obstructive sleep apnea 2023    History of stroke 2023    History of bacteremia 2023    Acute CVA (cerebrovascular accident) [facility] ) 2023    Hypercalcemia 2023    Discoloration of skin of foot 2023    Abdominal pain 2023    HFrEF (heart failure with reduced ejection fraction) ([address] ) 2023    Stroke ([address] ) 2023    HTN (hypertension) 2023    Cardiomyopathy ([address] ) 2023    Vitamin D insufficiency 2023    LAILA (acute kidney injury) ([address] ) 2022    Polypharmacy 2022    'light-for-dates' infant with signs of fetal malnutrition 2022    Bipolar depression ([address] ) 2022    Rheumatoid arthritis ([address] ) 2022    Closed fracture of multiple ribs of right side 2022    Traumatic pneumothorax 2022    Closed fracture of transverse process of lumbar vertebra ([address] ) 2022    Liver contusion 2022    Fall 2022    Acute pain due to trauma 2022    Right shoulder pain 2022    Severe protein-calorie malnutrition ([address] ) 2022    Undifferentiated connective tissue disease ([facility] ) 2021    Primary generalized (osteo)arthritis 2021    Peripheral neuropathy 2021    Osteoporosis 12/29/2021    Hx of compression fracture of spine 12/29/2021    Lumbar spondylosis 12/29/2021    Cervical spondylosis 12/29/2021    Anemia 06/26/2021    Chronic pain syndrome     Mild intermittent asthma without complication     Ambulatory dysfunction     Seronegative arthropathy of multiple sites ([address] )     Gastroesophageal reflux disease without esophagitis     Mixed stress and urge urinary incontinence     CHF (congestive heart failure) (HCC)       LOS (days): 2  Geometric Mean LOS (GMLOS) (days): 3 80  Days to GMLOS:2 1     OBJECTIVE:    Risk of Unplanned Readmission Score: 32 66         Current admission status: Inpatient  Referral Reason: Stroke    Preferred Pharmacy:   [id number]  [facility]  [facility]  [address] [address]  Phone: [phone] Fax: [address], [address] [facility]  [address] [address]  Phone: [phone] Fax: [phone]    Primary Care Provider: Aj Rothman DO    Primary Insurance: MEDICARE  Secondary Insurance: AARP    ASSESSMENT:  [address] Representative - Daughter   Primary Phone: [phone] (Mobile)                         Readmission Root Cause  30 Day Readmission: Yes  Who directed you to return to the hospital?: Self  Did you get your prescriptions before you left the hospital?: Yes  Were you able to get your prescriptions filled when you left the hospital?: Yes  Did you take your medications as prescribed?: Yes  Were you able to get to your follow-up appointments?: Yes  During previous admission, was a post-acute recommendation made?: Yes  What post-acute resources were offered?: STR  Patient was readmitted due to: Stroke like symptoms  Action Plan: Neuro mgmt    Patient Information  Admitted from[de-identified] Home  Mental Status: Alert  During Assessment patient was accompanied by: Not accompanied during assessment  Assessment information provided by[de-identified] Patient  Primary Caregiver: Self  Support Systems: Son  Home entry access options  Select all that apply : Stairs  Number of steps to enter home  : 1  Do the steps have railings?: No  Type of Current Residence: 2 story home  Upon entering residence, is there a bedroom on the main floor (no further steps)?: Yes  Upon entering residence, is there a bathroom on the main floor (no further steps)?: Yes  In the last 12 months, was there a time when you were not able to pay the mortgage or rent on time?: No  In the last 12 months, how many places have you lived?: 1  In the last 12 months, was there a time when you did not have a steady place to sleep or slept in a shelter (including now)?: No  Homeless/housing insecurity resource given?: N/A  Living Arrangements: Lives w/ Son, Other (Comment) (Lives w/brother)  Is patient a ?: No    Activities of Daily Living Prior to Admission  Functional Status: Independent  Completes ADLs independently?: No  Level of ADL dependence: Assistance  Ambulates independently?: Yes  Does patient use assisted devices?: Yes  Assisted Devices (DME) used: Janet Borja  Does patient currently own DME?: Yes  What DME does the patient currently own?: Janet Borja  Does patient have a history of Outpatient Therapy (PT/OT)?: No  Does the patient have a history of Short-Term Rehab?: Yes ([facility])  Does patient have a history of HHC?: No  Does patient currently have [facility]?: No         Patient Information Continued  Income Source: Pension/FCI  Does patient have prescription coverage?: Yes  Within the past 12 months, you worried that your food would run out before you got the money to buy more : Never true  Within the past 12 months, the food you bought just didn't last and you didn't have money to get more : Never true  Food insecurity resource given?: N/A  Does patient receive dialysis treatments?: No  Does patient have a history of substance abuse?: No  Does patient have a history of Mental Health Diagnosis?: No    PHQ 2/9 Screening   Reviewed PHQ 2/9 Depression Screening Score?: No    Means of Transportation  Means of Transport to Appts[de-identified] Family transport  In the past 12 months, has lack of transportation kept you from medical appointments or from getting medications?: No  In the past 12 months, has lack of transportation kept you from meetings, work, or from getting things needed for daily living?: No  Was application for public transport provided?: N/A        DISCHARGE DETAILS:    Discharge planning discussed with[de-identified] CM spoke with the pt  Freedom of Choice: Yes  Comments - Freedom of Choice: FOC explored and the pt prefers to return home w/[facility] services  CM contacted family/caregiver?: No- see comments (Pt states she will update her family)  Were Treatment Team discharge recommendations reviewed with patient/caregiver?: Yes  Did patient/caregiver verbalize understanding of patient care needs?: Yes  Were patient/caregiver advised of the risks associated with not following Treatment Team discharge recommendations?: Yes    Contacts  Reason/Outcome: Continuity of Care, Discharge [address]         Is the patient interested in [facility] at discharge?: Yes  [address] requested[de-identified] Physical Therapy, Occupational [address] Name[de-identified] [facility] Provider[de-identified] PCP  Home Health Services Needed[de-identified] Strengthening/Theraputic Exercises to Improve Function, Gait/ADL Training  Homebound Criteria Met[de-identified] Uses an Assist Device (i e  cane, walker, etc)  Supporting Clincal Findings[de-identified] Limited Endurance    DME Referral Provided  Referral made for DME?: No    Other Referral/Resources/Interventions Provided:  Interventions: [facility]  Referral Comments: Pt to be referred for [facility] services      Would you like to participate in our [address] service program?  : No - Declined    Treatment Team Recommendation: Home with Home Health Care  Discharge Destination Plan[de-identified] Home with Fernando Toledo

## 2023-05-01 NOTE — DISCHARGE SUMMARY
[facility]  Discharge- Simone Farrell 1955, 75 y o  female MRN: [id number]  Unit/Bed#: [facility] 711-01 Encounter: [id number]  Primary Care Provider: Farzana Cordova DO   Date and time admitted to hospital: 4/29/2023  8:11 PM    * Stroke-like symptoms  Assessment & Plan  · Presented after son noted worsening left-sided facial droop  Note recent admission for CVA with residual L sided weakness  · Consider recrudescence of prior stroke symptoms possibly secondary to hypotension as patient noted her BP was low at home   · No infectious signs or symptoms however UA with leukocytes and patient was discharged on Nitrofurantoin 100 mg BID x 5 days  · Appreciate neurology consult and recommendations   · Repeat MRI brain: Motion-degraded examination  No evidence for a new large acute vascular distribution infarct  Evolving right MCA distribution infarct with associated chronic hemosiderin deposition  No acute intracranial hemorrhage  · MRA carotids: No hemodynamically significant stenosis in the major arteries of the neck on this motion degraded study  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  · MRA brain: 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, stable  Neurovascular follow-up recommended on a nonemergent basis  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  · Routine EEG: abnormal 25 minutes awake and drowsy EEG due to continuous theta activity over the right hemisphere and attenuation of beta activity over the right frontal region  These findings indicate cerebral dysfunction over the right hemisphere, maximal over the right frontal region, likely related to underlying structural lesion    · Continue home Eliquis; no need for ASA or statin   · Therapy has cleared for home health rehabilitation     HTN (hypertension)  Assessment & Plan  · Patient claims she had low blood pressures at home, today BP soft in the 90s to low 100s  · PTA regimen includes: Minipress 1 mg at bedtime, Aldactone 25 mg daily, Metoprolol tartrate 25 mg every 12 hours   · Given possibility recrudescence of prior stroke symptoms in the setting of low BP will decrease home metoprolol to 12 5 mg BID  · Continue to monitor and titrate medications as needed on outpatient basis - discussed with patient's daughter     Dysphagia  Assessment & Plan  · Appreciate speech therapy evaluation   · Recommended level 2 dysphagia diet with nectar thick liquids   · Aspiration precautions     Nocturnal hypoxia  Assessment & Plan  Noted overnight and requiring 3 L NC, weaned to room air this AM  Patient reports chronic history of nocturna hypoxia and uses supplemental oxygen at baseline  · CXR: No acute cardiopulmonary disease  · Given nocturnal hypoxia would recommend outpatient sleep study   · Modified diet per speech, continue aspiration precautions   · Ambulatory pulse ox prior to discharge - acceptable     Rheumatoid arthritis (HCC)  Assessment & Plan  · Continue Plaquenil 200 mg twice daily, Neurontin 400 mg twice daily  Bipolar depression ([facility] [address] )  Assessment & Plan  · Continue Wellbutrin 100 mg twice daily, Cymbalta 60 mg twice daily, Ativan 0 25 mg twice daily as needed      Mild intermittent asthma without complication  Assessment & Plan  · No signs of exacerbation   · Continue albuterol as needed     Medical Problems     Resolved Problems  Date Reviewed: 5/1/2023   None       Discharging Physician / Practitioner: Esteban Tenorio PA-C  PCP: Sheree Alicia DO  Admission Date:   Admission Orders (From admission, onward)     Ordered        04/29/23 2152  Inpatient Admission  Once                      Discharge Date: 5/1/23    Consultations During Hospital Stay:  · Neurology     Procedures Performed:   · None     Significant Findings / Test Results: · Outlined above     Incidental Findings:   · None      Test Results Pending at Discharge (will require follow up): · None      Outpatient Tests Requested:  · None     Complications:  None     Reason for Admission: stroke like symptoms     Hospital Course:   Alix Borges is a 75 y o  female patient who originally presented to the hospital on 4/29/2023 due to worsening left facial droop and dysarthria  She had recent admission for acute CVA a few weeks ago  She was admitted to stroke pathway and seen in consultation by neurology  Repeat MRI without evidence of new stroke  EEG showed abnormalities in the area of stroke and neuro did not recommend initiation of AED  Suspicion for recrudescence of prior stoke symptoms in the setting of hypotension vs UTI  PTA metoprolol dose reduced in half, patient and her daughter advised to keep an eye on HR and BP at home and contact cardiology or PCP if any issues  She had UA to rule out infection which was positive for WBCs and patient was discharged on antibiotic  Therapy cleared for discharge home with home health rehabilitation  Please see above list of diagnoses and related plan for additional information  Condition at Discharge: fair    Discharge Day Visit / Exam:   Subjective:  Patient offers no new complaints today, eager to go home  Vitals: Blood Pressure: 102/63 (05/01/23 1513)  Pulse: 87 (05/01/23 1513)  Temperature: 97 8 °F (36 6 °C) (05/01/23 1513)  Temp Source: Oral (04/30/23 1403)  Respirations: 16 (05/01/23 1513)  Weight - Scale: 56 8 kg (125 lb 3 2 oz) (05/01/23 0548)  SpO2: 92 % (dipped to 88 but recovered to low 90's quickly) (05/01/23 1530)  Exam:   Physical Exam  Constitutional:       General: She is not in acute distress  Cardiovascular:      Rate and Rhythm: Normal rate  Pulmonary:      Effort: Pulmonary effort is normal  No respiratory distress  Breath sounds: No wheezing, rhonchi or rales     Neurological:      Mental Status: She is alert and oriented to person, place, and time  Mental status is at baseline  Discussion with Family: Updated  (daughter) via phone  Discharge instructions/Information to patient and family:   See after visit summary for information provided to patient and family  Provisions for Follow-Up Care:  See after visit summary for information related to follow-up care and any pertinent home health orders  Disposition:   Home with VNA Services (Reminder: Complete face to face encounter)    Planned Readmission: no     Discharge Statement:  I spent 35 minutes discharging the patient  This time was spent on the day of discharge  I had direct contact with the patient on the day of discharge  Greater than 50% of the total time was spent examining patient, answering all patient questions, arranging and discussing plan of care with patient as well as directly providing post-discharge instructions  Additional time then spent on discharge activities  Discharge Medications:  See after visit summary for reconciled discharge medications provided to patient and/or family        **Please Note: This note may have been constructed using a voice recognition system**

## 2023-05-01 NOTE — PLAN OF CARE
Problem: MOBILITY - ADULT  Goal: Maintain or return to baseline ADL function  Description: INTERVENTIONS:  -  Assess patient's ability to carry out ADLs; assess patient's baseline for ADL function and identify physical deficits which impact ability to perform ADLs (bathing, care of mouth/teeth, toileting, grooming, dressing, etc )  - Assess/evaluate cause of self-care deficits   - Assess range of motion  - Assess patient's mobility; develop plan if impaired  - Assess patient's need for assistive devices and provide as appropriate  - Encourage maximum independence but intervene and supervise when necessary  - Involve family in performance of ADLs  - Assess for home care needs following discharge   - Consider OT consult to assist with ADL evaluation and planning for discharge  - Provide patient education as appropriate  Outcome: Progressing  Goal: Maintains/Returns to pre admission functional level  Description: INTERVENTIONS:  - Perform BMAT or MOVE assessment daily    - Set and communicate daily mobility goal to care team and patient/family/caregiver     - Collaborate with rehabilitation services on mobility goals if consulted  - Ambulate patient 3 times a day  - Out of bed to chair 3 times a day   - Out of bed for meals 3 times a day  - Out of bed for toileting  - Record patient progress and toleration of activity level   Outcome: Progressing     Problem: Prexisting or High Potential for Compromised Skin Integrity  Goal: Skin integrity is maintained or improved  Description: INTERVENTIONS:  - Identify patients at risk for skin breakdown  - Assess and monitor skin integrity  - Assess and monitor nutrition and hydration status  - Monitor labs   - Assess for incontinence   - Turn and reposition patient  - Assist with mobility/ambulation  - Relieve pressure over bony prominences  - Avoid friction and shearing  - Provide appropriate hygiene as needed including keeping skin clean and dry  - Evaluate need for skin moisturizer/barrier cream  - Collaborate with interdisciplinary team   - Patient/family teaching  - Consider wound care consult   Outcome: Progressing     Problem: Neurological Deficit  Goal: Neurological status is stable or improving  Description: Interventions:  - Monitor and assess patient's level of consciousness, motor function, sensory function, and level of assistance needed for ADLs  - Monitor and report changes from baseline  Collaborate with interdisciplinary team to initiate plan and implement interventions as ordered  - Provide and maintain a safe environment  - Consider seizure precautions  - Consider fall precautions  - Consider aspiration precautions  - Consider bleeding precautions  Outcome: Progressing     Problem: Activity Intolerance/Impaired Mobility  Goal: Mobility/activity is maintained at optimum level for patient  Description: Interventions:  - Assess and monitor patient  barriers to mobility and need for assistive/adaptive devices  - Assess patient's emotional response to limitations  - Collaborate with interdisciplinary team and initiate plans and interventions as ordered  - Encourage independent activity per ability   - Maintain proper body alignment  - Perform active/passive rom as tolerated/ordered  - Plan activities to conserve energy   - Turn patient as appropriate  Outcome: Progressing     Problem: Communication Impairment  Goal: Ability to express needs and understand communication  Description: Assess patient's communication skills and ability to understand information  Patient will demonstrate use of effective communication techniques, alternative methods of communication and understanding even if not able to speak  - Encourage communication and provide alternate methods of communication as needed  - Collaborate with case management/ for discharge needs  - Include patient/family/caregiver in decisions related to communication    Outcome: Progressing     Problem: Potential for Aspiration  Goal: Non-ventilated patient's risk of aspiration is minimized  Description: Assess and monitor vital signs, respiratory status, and labs (WBC)  Monitor for signs of aspiration (tachypnea, cough, rales, wheezing, cyanosis, fever)  - Assess and monitor patient's ability to swallow  - Place patient up in chair to eat if possible  - HOB up at 90 degrees to eat if unable to get patient up into chair   - Supervise patient during oral intake  - Instruct patient/ family to take small bites  - Instruct patient/ family to take small single sips when taking liquids  - Follow patient-specific strategies generated by speech pathologist   Outcome: Progressing     Problem: Nutrition  Goal: Nutrition/Hydration status is improving  Description: Monitor and assess patient's nutrition/hydration status for malnutrition (ex- brittle hair, bruises, dry skin, pale skin and conjunctiva, muscle wasting, smooth red tongue, and disorientation)  Collaborate with interdisciplinary team and initiate plan and interventions as ordered  Monitor patient's weight and dietary intake as ordered or per policy  Utilize nutrition screening tool and intervene per policy  Determine patient's food preferences and provide high-protein, high-caloric foods as appropriate  - Assist patient with eating   - Allow adequate time for meals   - Encourage patient to take dietary supplement as ordered  - Collaborate with clinical nutritionist   - Include patient/family/caregiver in decisions related to nutrition    Outcome: Progressing     Problem: PAIN - ADULT  Goal: Verbalizes/displays adequate comfort level or baseline comfort level  Description: Interventions:  - Encourage patient to monitor pain and request assistance  - Assess pain using appropriate pain scale  - Administer analgesics based on type and severity of pain and evaluate response  - Implement non-pharmacological measures as appropriate and evaluate response  - Consider cultural and social influences on pain and pain management  - Notify physician/advanced practitioner if interventions unsuccessful or patient reports new pain  Outcome: Progressing     Problem: INFECTION - ADULT  Goal: Absence or prevention of progression during hospitalization  Description: INTERVENTIONS:  - Assess and monitor for signs and symptoms of infection  - Monitor lab/diagnostic results  - Monitor all insertion sites, i e  indwelling lines, tubes, and drains  - Monitor endotracheal if appropriate and nasal secretions for changes in amount and color  - [place] appropriate cooling/warming therapies per order  - Administer medications as ordered  - Instruct and encourage patient and family to use good hand hygiene technique  - Identify and instruct in appropriate isolation precautions for identified infection/condition  Outcome: Progressing     Problem: DISCHARGE PLANNING  Goal: Discharge to home or other facility with appropriate resources  Description: INTERVENTIONS:  - Identify barriers to discharge w/patient and caregiver  - Arrange for needed discharge resources and transportation as appropriate  - Identify discharge learning needs (meds, wound care, etc )  - Arrange for interpretive services to assist at discharge as needed  - Refer to Case Management Department for coordinating discharge planning if the patient needs post-hospital services based on physician/advanced practitioner order or complex needs related to functional status, cognitive ability, or social support system  Outcome: Progressing     Problem: Knowledge Deficit  Goal: Patient/family/caregiver demonstrates understanding of disease process, treatment plan, medications, and discharge instructions  Description: Complete learning assessment and assess knowledge base    Interventions:  - Provide teaching at level of understanding  - Provide teaching via preferred learning methods  Outcome: Progressing Problem: NEUROSENSORY - ADULT  Goal: Achieves stable or improved neurological status  Description: INTERVENTIONS  - Monitor and report changes in neurological status  - Monitor vital signs such as temperature, blood pressure, glucose, and any other labs ordered   - Initiate measures to prevent increased intracranial pressure  - Monitor for seizure activity and implement precautions if appropriate      Outcome: Progressing  Goal: Achieves maximal functionality and self care  Description: INTERVENTIONS  - Monitor swallowing and airway patency with patient fatigue and changes in neurological status  - Encourage and assist patient to increase activity and self care     - Encourage visually impaired, hearing impaired and aphasic patients to use assistive/communication devices  Outcome: Progressing     Problem: CARDIOVASCULAR - ADULT  Goal: Maintains optimal cardiac output and hemodynamic stability  Description: INTERVENTIONS:  - Monitor I/O, vital signs and rhythm  - Monitor for S/S and trends of decreased cardiac output  - Administer and titrate ordered vasoactive medications to optimize hemodynamic stability  - Assess quality of pulses, skin color and temperature  - Assess for signs of decreased coronary artery perfusion  - Instruct patient to report change in severity of symptoms  Outcome: Progressing  Goal: Absence of cardiac dysrhythmias or at baseline rhythm  Description: INTERVENTIONS:  - Continuous cardiac monitoring, vital signs, obtain 12 lead EKG if ordered  - Administer antiarrhythmic and heart rate control medications as ordered  - Monitor electrolytes and administer replacement therapy as ordered  Outcome: Progressing     Problem: GASTROINTESTINAL - ADULT  Goal: Minimal or absence of nausea and/or vomiting  Description: INTERVENTIONS:  - Administer IV fluids if ordered to ensure adequate hydration  - Maintain NPO status until nausea and vomiting are resolved  - Nasogastric tube if ordered  - Administer ordered antiemetic medications as needed  - Provide nonpharmacologic comfort measures as appropriate  - Advance diet as tolerated, if ordered  - Consider nutrition services referral to assist patient with adequate nutrition and appropriate food choices  Outcome: Progressing  Goal: Maintains or returns to baseline bowel function  Description: INTERVENTIONS:  - Assess bowel function  - Encourage oral fluids to ensure adequate hydration  - Administer IV fluids if ordered to ensure adequate hydration  - Administer ordered medications as needed  - Encourage mobilization and activity  - Consider nutritional services referral to assist patient with adequate nutrition and appropriate food choices  Outcome: Progressing  Goal: Maintains adequate nutritional intake  Description: INTERVENTIONS:  - Monitor percentage of each meal consumed  - Identify factors contributing to decreased intake, treat as appropriate  - Assist with meals as needed  - Monitor I&O, weight, and lab values if indicated  - Obtain nutrition services referral as needed  Outcome: Progressing     Problem: METABOLIC, FLUID AND ELECTROLYTES - ADULT  Goal: Electrolytes maintained within normal limits  Description: INTERVENTIONS:  - Monitor labs and assess patient for signs and symptoms of electrolyte imbalances  - Administer electrolyte replacement as ordered  - Monitor response to electrolyte replacements, including repeat lab results as appropriate  - Instruct patient on fluid and nutrition as appropriate  Outcome: Progressing     Problem: SKIN/TISSUE INTEGRITY - ADULT  Goal: Incision(s), wounds(s) or drain site(s) healing without S/S of infection  Description: INTERVENTIONS  - Assess and document dressing, incision, wound bed, drain sites and surrounding tissue  - Provide patient and family education  - Perform skin care/dressing changes every day  Outcome: Progressing

## 2023-05-01 NOTE — QUICK NOTE
04/29/2023 2015 CT Stroke Alert Brain  Old right MCA distribution infarcts  No evidence of acute intracranial hemorrhage  Chronic microangiopathic changes  Displaced right nasal fractures and nondisplaced left nasal fracture  04/29/2023 2202 CT facial bones wo contrast  Age-indeterminate mildly depressed right anterior nasal bone fracture, nondisplaced left nasal bone fracture, and right mandibular coronoid process fracture  Mild bilateral inferior maxillary sinus disease  04/30/2023 1548 MRI Brain wo contrast  Motion-degraded examination  No evidence for a new large acute vascular distribution infarct  Evolving right MCA distribution infarct with associated chronic hemosiderin deposition  No acute intracranial hemorrhage      Plan:  - MRA HN Ordered  -- 0 5mg PO lorazepam ordered to be given before MRA once

## 2023-05-01 NOTE — CONSULTS
PHYSICAL MEDICINE AND REHABILITATION CONSULT NOTE  Melquiades Bonner 75 y o  female MRN: [id number]  Unit/Bed#: [facility] 711-01 Encounter: [id number]    Requested by (Physician/Service): Lindy Cummins, *  Reason for Consultation:  Assessment of rehabilitation needs    Assessment:  Rehabilitation Diagnosis:    Stroke like symptoms including worsening dysarthria and gait   Likely recrudescence of prior CVA due to hypotension    Nasal bone fracture    Dysarthria    Impaired mobility and self care    Recommendations:  Rehabilitation Plan:   Continue PT/OT (SLP) while on acute care   The patient reports she is back to her baseline and will likely be able to d/c to home with continued services   Covid-19 Testing: [facility] inpatient rehabilitation units require testing within 48 hours of all potential admissions at this time  *Re-testing is NOT required for patients recovering from COVID-19 infection if isolation has been discontinued per CDC criteria  Medical Co-morbidities Plan:  · Hypertension   · Rheumatoid arthritis   · Bipolar depression   · Asthma  · DVT ppx: Eliquis and SCD    Thank you for this consultation  Do not hesitate to contact service with further questions  LAURA Rosas  PM&R    I have spent a total time of 30 minutes on 05/01/23 in caring for this patient including Patient and family education, Counseling / Coordination of care, Documenting in the medical record, Obtaining or reviewing history   and Communicating with other healthcare professionals       History of Present Illness:  Melquiades Bonner is a 75 y o  female with a PMH of asthma, anxiety, bipolar depression, seronegative arthropathy of multiple joints, connective tissue disorder, obstructive sleep apnea and right MCA CVA with hemorrhagic transformation with residual left sided weaknesswho presented to the [facility] on 4/29/2023 with pronounced left sided facial droop and dysarthria  CT showed right MCA distribution infarcts with no evidence of acute intracranial hemorrhage with chronic microangiopathic changes  Displaced right nasal fracture and non-displaced left nasal fracture  Family did report that she fell that morning and was unsteady  MRI was motion degraded  There was no evidence of new infarct  Evolving right MCA distribution infarct with associated chronic hemosiderin deposition  No acute intracranial hemorrhage  MRA of the head showed 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 which was stable  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  OMFS was consulted for nasal bone fracture and there was no surgical intervention  Her symptoms were felt to likely be due to hypotension as family reported low BP at home  PM&R are consulted for rehabilitation recommendations  The patient was seen in her room  She reports she feels close to her baseline and that her dysarthria is about the same  She has back pain which is chronic  She denies any worsening left sided weakness  She denies chest pain, SOB, nausea, vomiting, dizziness, blurred vision or double vision  She reports that her son and brother are home and able to assist her as needed, she is never alone and she reports that she does not ambulate without supervision  Review of Systems: 10 point ROS negative except for what is noted in HPI    Function:  Prior level of function and living situation: The patient lives in a two level home with 1 TAB and needed assistance with ADLs  She lives with her son and brother  Her son help provides assistance and she is never alone  Current level of function:  Physical Therapy: Supervision for transfers and ambulation going 60 feet     Occupational Therapy: Supervision for eating, grooming, minimal assist for UB bathing/dressing, moderate assist for LB bathing/dressing, minimal assist for toileting  Speech Therapy: Dysphagia level 3 with nectar thick    Physical Exam:  /62   Pulse 79   Temp 97 5 °F (36 4 °C)   Resp 18   Wt 56 8 kg (125 lb 3 2 oz)   SpO2 98%   BMI 22 18 kg/m²        Intake/Output Summary (Last 24 hours) at 5/1/2023 0837  Last data filed at 4/30/2023 1430  Gross per 24 hour   Intake 450 ml   Output --   Net 450 ml       Body mass index is 22 18 kg/m²  Physical Exam  Constitutional:       General: She is not in acute distress  Appearance: Normal appearance  She is not toxic-appearing  HENT:      Head: Normocephalic and atraumatic  Right Ear: External ear normal       Left Ear: External ear normal       Nose: Nose normal       Mouth/Throat:      Mouth: Mucous membranes are moist       Pharynx: Oropharynx is clear  Eyes:      Extraocular Movements: Extraocular movements intact  Pulmonary:      Effort: Pulmonary effort is normal  No respiratory distress  Abdominal:      General: There is no distension  Musculoskeletal:      Comments: Left hemiparesis grossly 4/5 throughout    Skin:     General: Skin is warm and dry  Neurological:      Mental Status: She is alert and oriented to person, place, and time        Comments: Dysarthria and left hemiparesis which is chronic    Psychiatric:         Mood and Affect: Mood normal           Social History:    Social History     Socioeconomic History    Marital status:      Spouse name: None    Number of children: None    Years of education: None    Highest education level: None   Occupational History    None   Tobacco Use    Smoking status: Former    Smokeless tobacco: Never   Vaping Use    Vaping Use: Never used   Substance and Sexual Activity    Alcohol use: Not Currently     Comment: quit    Drug use: Not Currently     Types: Marijuana     Comment: has medical card    Sexual activity: None   Other Topics Concern    None   Social History Narrative    None     Social Determinants of Health     Financial Resource Strain: Not on file   Food Insecurity: No Food Insecurity    Worried About Running Out of Food in the Last Year: Never true    Ran Out of Food in the Last Year: Never true   Transportation Needs: No Transportation Needs    Lack of Transportation (Medical): No    Lack of Transportation (Non-Medical):  No   Physical Activity: Not on file   Stress: Not on file   Social Connections: Not on file   Intimate Partner Violence: Not on file   Housing Stability: Low Risk     Unable to Pay for Housing in the Last Year: No    Number of Places Lived in the Last Year: 1    Unstable Housing in the Last Year: No        Family History:    Family History   Problem Relation Age of Onset    No Known Problems Mother     No Known Problems Father     Arthritis Family     Diabetes Family     Stroke Family          Medications:     Current Facility-Administered Medications:     acetaminophen (TYLENOL) tablet 650 mg, 650 mg, Oral, Q6H PRN, Dejuan Allen MD    albuterol (PROVENTIL HFA,VENTOLIN HFA) inhaler 2 puff, 2 puff, Inhalation, Q6H PRN, Dejuan Allen MD    aluminum-magnesium hydroxide-simethicone (MYLANTA) oral suspension 30 mL, 30 mL, Oral, Q6H PRN, Dejuan Allen MD    apixaban (ELIQUIS) tablet 5 mg, 5 mg, Oral, BID, Dejuan Allen MD, 5 mg at 04/30/23 1859    aspirin chewable tablet 81 mg, 81 mg, Oral, Daily, Dejuan Allen MD, 81 mg at 04/30/23 1410    atorvastatin (LIPITOR) tablet 40 mg, 40 mg, Oral, QPM, Dejuan Allen MD, 40 mg at 04/30/23 1859    buPROPion [facility]) tablet 100 mg, 100 mg, Oral, BID, Dejuan Allen MD, 100 mg at 04/30/23 1901    cinacalcet (SENSIPAR) tablet 30 mg, 30 mg, Oral, Daily With Breakfast, Dejuan Allen MD, 30 mg at 04/30/23 1410    DULoxetine (CYMBALTA) delayed release capsule 60 mg, 60 mg, Oral, BID, Dejuan Allen MD, 60 mg at 04/30/23 2214    gabapentin (NEURONTIN) capsule 100 mg, 100 mg, Oral, BID, Hillary Allen MD, 100 mg at 04/30/23 1903    gabapentin (NEURONTIN) capsule 300 mg, 300 mg, Oral, BID, Hillary Allen MD, 300 mg at 04/30/23 1859    hydroxychloroquine (PLAQUENIL) tablet 200 mg, 200 mg, Oral, BID With Meals, Hillary Allen MD, 200 mg at 04/30/23 1408    lamoTRIgine (LaMICtal) tablet 150 mg, 150 mg, Oral, HS, Hillary Allen MD, 150 mg at 04/30/23 2214    lidocaine (LIDODERM) 5 % patch 1 patch, 1 patch, Topical, Daily, Hillary Allen MD, 1 patch at 04/30/23 1001    LORazepam (ATIVAN) tablet 0 5 mg, 0 5 mg, Oral, Once PRN, Marino Diss, DO    magnesium Oxide (MAG-OX) tablet 800 mg, 800 mg, Oral, BID, Hillary Allen MD, 800 mg at 04/30/23 1903    melatonin tablet 3 mg, 3 mg, Oral, HS, Hillary Allen MD, 3 mg at 04/30/23 2214    metoclopramide (REGLAN) injection 10 mg, 10 mg, Intravenous, Q6H PRN, Stephanie Riley PA-C, 10 mg at 04/30/23 1250    metoprolol tartrate (LOPRESSOR) tablet 25 mg, 25 mg, Oral, Q12H [address], Hillary Allen MD, 25 mg at 04/30/23 2214    morphine (MSIR) IR tablet 7 5 mg, 7 5 mg, Oral, BID PRN, Hillary Allen MD    ondansetron [facility]) injection 4 mg, 4 mg, Intravenous, Q6H PRN, Hillary Allen MD, 4 mg at 04/30/23 0955    pantoprazole (PROTONIX) EC tablet 40 mg, 40 mg, Oral, Early Morning, Hillary Allen MD, 40 mg at 05/01/23 0517    polyethylene glycol (MIRALAX) packet 17 g, 17 g, Oral, Daily PRN, Hillary Allen MD    prazosin (MINIPRESS) capsule 1 mg, 1 mg, Oral, HS, Hillary Allen MD, 1 mg at 04/30/23 2215    senna-docusate sodium (SENOKOT S) 8 6-50 mg per tablet 1 tablet, 1 tablet, Oral, BID, Hillary Allen MD    spironolactone (ALDACTONE) tablet 25 mg, 25 mg, Oral, Daily, Hillary Allen MD    Past Medical History:     Past Medical History:   Diagnosis Date    Anxiety     Asthma     [facility]     Chronic narcotic dependence ([facility] )     Chronic pain     Depression     Enterovirus heart infection     SHAR (obstructive sleep apnea)     Osteoarthritis     Peripheral neuropathy     Plantar fasciitis of right foot     Senile osteoporosis     Seronegative arthropathy of multiple sites ([facility]     Undifferentiated connective tissue disease ([facility] )         Past Surgical History:     Past Surgical History:   Procedure Laterality Date    BREAST IMPLANT      FIXATION KYPHOPLASTY LUMBAR SPINE      HYSTERECTOMY      INCONTINENCE SURGERY N/A     IR STROKE ALERT  2/12/2023    NASAL SEPTOPLASTY W/ TURBINOPLASTY N/A     RECTAL PROLAPSE REPAIR N/A     REPAIR RECTOCELE      RHINOPLASTY N/A          Allergies: Allergies   Allergen Reactions    Cephalosporins Other (See Comments)     rash    Dye [Iodinated Contrast Media] Anaphylaxis     IVP dye    Iodine - Food Allergy Anaphylaxis     Anaphylaxis      Methocarbamol Hives    Sulfa Antibiotics Hives    Bactrim [Sulfamethoxazole-Trimethoprim] Hives    Erythromycin Hives    Penicillins     Gabapentin Rash           LABORATORY RESULTS:      Lab Results   Component Value Date    HGB 10 4 (L) 05/01/2023    HCT 33 6 (L) 05/01/2023    WBC 4 17 (L) 05/01/2023     Lab Results   Component Value Date    BUN 9 05/01/2023    K 4 1 05/01/2023     (H) 05/01/2023    GLUCOSE 87 11/17/2016    CREATININE 1 05 05/01/2023     Lab Results   Component Value Date    PROTIME 19 9 (H) 04/29/2023    INR 1 66 (H) 04/29/2023        DIAGNOSTIC STUDIES: Reviewed  CT facial bones without contrast    Result Date: 4/30/2023  Impression: Age-indeterminate mildly depressed right anterior nasal bone fracture, nondisplaced left nasal bone fracture, and right mandibular coronoid process fracture  Mild bilateral inferior maxillary sinus disease   Workstation performed: [id number]     MRA head wo contrast    Result Date: 5/1/2023  Impression: 1  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, stable  Neurovascular follow-up recommended on a nonemergent basis  2  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  Workstation performed: [id number]     MRA carotids wo contrast    Result Date: 5/1/2023  Impression: 1  No hemodynamically significant stenosis in the major arteries of the neck on this motion degraded study  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 is better seen on the concurrent MRI of the brain  Workstation performed: [id number]     MRI brain wo contrast    Result Date: 4/30/2023  Impression: Motion-degraded examination  No evidence for a new large acute vascular distribution infarct  Evolving right MCA distribution infarct with associated chronic hemosiderin deposition  No acute intracranial hemorrhage  Workstation performed: [id number]     CT stroke alert brain    Result Date: 4/29/2023  Impression: Old right MCA distribution infarcts  No evidence of acute intracranial hemorrhage  Chronic microangiopathic changes  Displaced right nasal fractures and nondisplaced left nasal fracture   I personally discussed this study with Dr Ailyn Scanlon on 4/29/2023 8:24 PM  Workstation performed: [id number]

## 2023-05-01 NOTE — ASSESSMENT & PLAN NOTE
· Patient claims she had low blood pressures at home, today BP soft in the 90s to low 100s  · PTA regimen includes: Minipress 1 mg at bedtime, Aldactone 25 mg daily, Metoprolol tartrate 25 mg every 12 hours   · Given possibility recrudescence of prior stroke symptoms in the setting of low BP will decrease home metoprolol to 12 5 mg BID  · Continue to monitor and titrate medications as needed on outpatient basis - discussed with patient's daughter

## 2023-05-01 NOTE — ASSESSMENT & PLAN NOTE
· Presented after son noted worsening left-sided facial droop  Note recent admission for CVA with residual L sided weakness  · Consider recrudescence of prior stroke symptoms possibly secondary to hypotension as patient noted her BP was low at home   · No infectious signs or symptoms however UA with leukocytes and patient was discharged on Nitrofurantoin 100 mg BID x 5 days  · Appreciate neurology consult and recommendations   · Repeat MRI brain: Motion-degraded examination  No evidence for a new large acute vascular distribution infarct  Evolving right MCA distribution infarct with associated chronic hemosiderin deposition  No acute intracranial hemorrhage  · MRA carotids: No hemodynamically significant stenosis in the major arteries of the neck on this motion degraded study  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  · MRA brain: 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, stable  Neurovascular follow-up recommended on a nonemergent basis  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  · Routine EEG: abnormal 25 minutes awake and drowsy EEG due to continuous theta activity over the right hemisphere and attenuation of beta activity over the right frontal region  These findings indicate cerebral dysfunction over the right hemisphere, maximal over the right frontal region, likely related to underlying structural lesion    · Continue home Eliquis; no need for ASA or statin   · Therapy has cleared for home health rehabilitation

## 2023-05-01 NOTE — PROGRESS NOTES
Order for UA with culture received, per neuro and SLIM do not d/c patient until UA resulted  Pt and family aware and agreeable at this time

## 2023-05-01 NOTE — PROGRESS NOTES
Order for d/c received  IV site, telemetry removed  AVS reviewed  All questions answered  Awaiting transportation from pt's son at this time

## 2023-05-01 NOTE — ASSESSMENT & PLAN NOTE
75 y o  female w h/o recent right MCA stroke status post thrombectomy on 2/12/23 on Eliquis, anxiety, bipolar disorder, chronic pain syndrome, RA, HF who presents at [facility] ED on 4/29/23 as a stroke alert on 4/29/2023  8:11 PM with initial NIHSS of 2 and LKW 7:10 PM about an hour ago , initial Blood Pressure: 132/60  Initial presenting deficits were worsening facial droop, headache, right arm pain  As a result of pt on eliquis and not disabling symptoms pt was determined to not be a candidate for thrombolysis (TNK)  - Exam on 04/29/23:   - Current Blood Pressure: 107/67, BP over 24 hours: BP  Min: 99/61  Max: 134/70   - Vascular risk factors: previous stroke   - Home Meds: Eliquis     Workup:  - CTH:Old right MCA distribution infarcts  No evidence of acute intracranial hemorrhage  Chronic microangiopathic changes  Displaced right nasal fractures and nondisplaced left nasal fracture  - CTA: deferred as patient is allergic to the dye  - MRI: Old right MCA distrubution infarct with associated chronic hemosiderin deposition  No acute intracranial hemorrhage  - MRA: 1 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 segment of the right internal carotid     Pertinent Scores:  - NIHSS: 2  - Stroke Modified [place] Score: 4 (Moderately severe disability; unable to walk and attend to bodily needs without assistance)    Impression: Suspicion for neurological problem low   However given recrudescence/worsening of prior symptoms, will need further work up to r/o post stroke seizures with decreased seizure threshold in the setting of possible infection    Plan:  - Continue home regimen of Eliquis 5 mg BID  - Will obtain routine EEG to r/o seizure  - Follow up CxR official read  - Follow up UA/Urine Culture  - Recommend d/c ASA from neurological standpoint

## 2023-05-01 NOTE — PROGRESS NOTES
Medical team paged and came to the bedside per RN request to see the patient, Pt looked to be more sleepy than usual and was harder to wake up, Pt had an MRI on scheduled 04 30 2023 around 2300 which was completed  ABG ordered and drawn by respiratory

## 2023-05-01 NOTE — PLAN OF CARE
Problem: MOBILITY - ADULT  Goal: Maintain or return to baseline ADL function  Description: INTERVENTIONS:  -  Assess patient's ability to carry out ADLs; assess patient's baseline for ADL function and identify physical deficits which impact ability to perform ADLs (bathing, care of mouth/teeth, toileting, grooming, dressing, etc )  - Assess/evaluate cause of self-care deficits   - Assess range of motion  - Assess patient's mobility; develop plan if impaired  - Assess patient's need for assistive devices and provide as appropriate  - Encourage maximum independence but intervene and supervise when necessary  - Involve family in performance of ADLs  - Assess for home care needs following discharge   - Consider OT consult to assist with ADL evaluation and planning for discharge  - Provide patient education as appropriate  4/30/2023 2046 by David Regan RN  Outcome: Progressing  4/30/2023 2045 by David Regan RN  Outcome: Progressing  Goal: Maintains/Returns to pre admission functional level  Description: INTERVENTIONS:  - Perform BMAT or MOVE assessment daily    - Set and communicate daily mobility goal to care team and patient/family/caregiver  - Collaborate with rehabilitation services on mobility goals if consulted  - Perform Range of Motion 2 times a day  - Reposition patient every 2 hours    - Dangle patient 2 times a day  - Stand patient 2 times a day  - Ambulate patient 3 times a day  - Out of bed to chair 3 times a day   - Out of bed for meals 3 times a day  - Out of bed for toileting  - Record patient progress and toleration of activity level   4/30/2023 2046 by David Regan RN  Outcome: Progressing  4/30/2023 2045 by David Regan RN  Outcome: Progressing     Problem: Prexisting or High Potential for Compromised Skin Integrity  Goal: Skin integrity is maintained or improved  Description: INTERVENTIONS:  - Identify patients at risk for skin breakdown  - Assess and monitor skin integrity  - Assess and monitor nutrition and hydration status  - Monitor labs   - Assess for incontinence   - Turn and reposition patient  - Assist with mobility/ambulation  - Relieve pressure over bony prominences  - Avoid friction and shearing  - Provide appropriate hygiene as needed including keeping skin clean and dry  - Evaluate need for skin moisturizer/barrier cream  - Collaborate with interdisciplinary team   - Patient/family teaching  - Consider wound care consult   4/30/2023 2046 by Edd Closs, RN  Outcome: Progressing  4/30/2023 2045 by Edd Closs, RN  Outcome: Progressing     Problem: Neurological Deficit  Goal: Neurological status is stable or improving  Description: Interventions:  - Monitor and assess patient's level of consciousness, motor function, sensory function, and level of assistance needed for ADLs  - Monitor and report changes from baseline  Collaborate with interdisciplinary team to initiate plan and implement interventions as ordered  - Provide and maintain a safe environment  - Consider seizure precautions  - Consider fall precautions  - Consider aspiration precautions  - Consider bleeding precautions  Outcome: Progressing     Problem: Activity Intolerance/Impaired Mobility  Goal: Mobility/activity is maintained at optimum level for patient  Description: Interventions:  - Assess and monitor patient  barriers to mobility and need for assistive/adaptive devices  - Assess patient's emotional response to limitations  - Collaborate with interdisciplinary team and initiate plans and interventions as ordered  - Encourage independent activity per ability   - Maintain proper body alignment  - Perform active/passive rom as tolerated/ordered    - Plan activities to conserve energy   - Turn patient as appropriate  Outcome: Progressing     Problem: Communication Impairment  Goal: Ability to express needs and understand communication  Description: Assess patient's communication skills and ability to understand information  Patient will demonstrate use of effective communication techniques, alternative methods of communication and understanding even if not able to speak  - Encourage communication and provide alternate methods of communication as needed  - Collaborate with case management/ for discharge needs  - Include patient/family/caregiver in decisions related to communication  Outcome: Progressing     Problem: Potential for Aspiration  Goal: Non-ventilated patient's risk of aspiration is minimized  Description: Assess and monitor vital signs, respiratory status, and labs (WBC)  Monitor for signs of aspiration (tachypnea, cough, rales, wheezing, cyanosis, fever)  - Assess and monitor patient's ability to swallow  - Place patient up in chair to eat if possible  - HOB up at 90 degrees to eat if unable to get patient up into chair   - Supervise patient during oral intake  - Instruct patient/ family to take small bites  - Instruct patient/ family to take small single sips when taking liquids  - Follow patient-specific strategies generated by speech pathologist   Outcome: Progressing  Goal: Ventilated patient's risk of aspiration is minimized  Description: Assess and monitor vital signs, respiratory status, airway cuff pressure, and labs (WBC)  Monitor for signs of aspiration (tachypnea, cough, rales, wheezing, cyanosis, fever)  - Elevate head of bed 30 degrees if patient has tube feeding   - Monitor tube feeding  Outcome: Progressing     Problem: Nutrition  Goal: Nutrition/Hydration status is improving  Description: Monitor and assess patient's nutrition/hydration status for malnutrition (ex- brittle hair, bruises, dry skin, pale skin and conjunctiva, muscle wasting, smooth red tongue, and disorientation)  Collaborate with interdisciplinary team and initiate plan and interventions as ordered  Monitor patient's weight and dietary intake as ordered or per policy   Utilize nutrition screening tool and intervene per policy  Determine patient's food preferences and provide high-protein, high-caloric foods as appropriate  - Assist patient with eating   - Allow adequate time for meals   - Encourage patient to take dietary supplement as ordered  - Collaborate with clinical nutritionist   - Include patient/family/caregiver in decisions related to nutrition    Outcome: Progressing

## 2023-05-01 NOTE — PROGRESS NOTES
NEUROLOGY RESIDENCY PROGRESS NOTE     Name: Simone Farrell   Age & Sex: 75 y o  female   MRN: [id number]  Unit/Bed#: [facility] 200-1   Encounter: [id number]    Recommendations for outpatient neurological follow up have yet to be determined  Pending for discharge: Routine EEG    ASSESSMENT & PLAN     Stroke-like symptoms  Assessment & Plan  75 y o  female w h/o recent right MCA stroke status post thrombectomy on 2/12/23 on Eliquis, anxiety, bipolar disorder, chronic pain syndrome, RA, HF who presents at [facility] ED on 4/29/23 as a stroke alert on 4/29/2023  8:11 PM with initial NIHSS of 2 and LKW 7:10 PM about an hour ago , initial Blood Pressure: 132/60  Initial presenting deficits were worsening facial droop, headache, right arm pain  As a result of pt on eliquis and not disabling symptoms pt was determined to not be a candidate for thrombolysis (TNK)  - Exam on 04/29/23:   - Current Blood Pressure: 107/67, BP over 24 hours: BP  Min: 99/61  Max: 134/70   - Vascular risk factors: previous stroke   - Home Meds: Eliquis     Workup:  - CTH:Old right MCA distribution infarcts  No evidence of acute intracranial hemorrhage  Chronic microangiopathic changes  Displaced right nasal fractures and nondisplaced left nasal fracture  - CTA: deferred as patient is allergic to the dye  - MRI: Old right MCA distrubution infarct with associated chronic hemosiderin deposition  No acute intracranial hemorrhage  - MRA: 1 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 segment of the right internal carotid     Pertinent Scores:  - NIHSS: 2  - Stroke Modified Rawlins Score: 4 (Moderately severe disability; unable to walk and attend to bodily needs without assistance)    Impression: Suspicion for neurological problem low   However given recrudescence/worsening of prior symptoms, will need further work up to r/o post stroke seizures with decreased seizure threshold in the setting of possible infection    Plan:  - Continue home regimen of Eliquis 5 mg BID  - Will obtain routine EEG to r/o seizure  - Follow up CxR official read  - Follow up UA/Urine Culture  - Recommend d/c ASA from neurological standpoint            SUBJECTIVE     Patient was seen and examined  No acute events overnight  Patient reports that she feels at her baseline  OBJECTIVE     Patient ID: Sergio Coughlin is a 75 y o  female  Vitals:    23 0050 23 0241 23 0548 23 0708   BP: 96/59 102/61  103/62   Pulse: 85 89  79   Resp:    18   Temp:    97 5 °F (36 4 °C)   TempSrc:       SpO2: (!) 84% 93%  98%   Weight:   56 8 kg (125 lb 3 2 oz)       Temperature:   Temp (24hrs), Av 6 °F (36 4 °C), Min:97 4 °F (36 3 °C), Max:98 °F (36 7 °C)    Temperature: 97 5 °F (36 4 °C)      Physical Exam  Vitals and nursing note reviewed  Constitutional:       General: She is not in acute distress  Appearance: She is well-developed  HENT:      Head: Normocephalic and atraumatic  Eyes:      Conjunctiva/sclera: Conjunctivae normal    Cardiovascular:      Rate and Rhythm: Normal rate  Pulmonary:      Effort: Pulmonary effort is normal    Abdominal:      Palpations: Abdomen is soft  Tenderness: There is no abdominal tenderness  Musculoskeletal:      Cervical back: Neck supple  Skin:     General: Skin is warm  Capillary Refill: Capillary refill takes less than 2 seconds  Neurological:      Mental Status: She is alert and oriented to person, place, and time  Psychiatric:         Mood and Affect: Mood normal           Neurologic Exam     Mental Status   Oriented to person, place, and time  Neurological Examination:     Mental Status: The patient was awake, alert, attentive, oriented to person, place, and time  Has chronic slurred speech because of previous stroke  No aphasia  Cranial Nerves:   I: smell Not tested   II: visual fields left hemianopia?   Pupils equal, round, reactive to light with normal accomodation   Fundus: benign fundus  III,IV,VI: extraocular muscles EOMI, no nystagmus   V: masseter and pterygoid strength full  Sensation in the V1 through V3 distributions intact to pinprick and light touch bilaterally  VII: Face is symmetric with no weakness noted  VIII: Audition intact to finger rub bilaterally  IX/X: Uvula midline  Soft palate elevation symmetric  XI: Trapezius and SCM strength 5/5 B/L  XII: Tongue midline with no atrophy or fasciculations with appropriate movement  Motor Examination:   No pronator drift  Bulk: Normal  No atrophy Tone: Normal  Fasciculations: None  Strength: 4/5 strength in the left upper and left lower extremity  5/5 on the right  Reflexes: 2+   Clonus: None  Coordination: Patient able to perform normal finger-to-nose  Sensory: Decreased sensation to light touch on the left compared to the right  Gait: deferred     LABORATORY DATA     Labs: I have personally reviewed pertinent reports  Results from last 7 days   Lab Units 05/01/23 0530 04/30/23 0435 04/29/23  2044   WBC Thousand/uL 4 17* 4 56 5 59   HEMOGLOBIN g/dL 10 4* 10 7* 11 3*   HEMATOCRIT % 33 6* 32 9* 34 1*   PLATELETS Thousands/uL 199 225 249   NEUTROS PCT %  --  58  --    MONOS PCT %  --  11  --       Results from last 7 days   Lab Units 05/01/23 0530 04/30/23 0435 04/29/23  2044   SODIUM mmol/L 143 142 139   POTASSIUM mmol/L 4 1 3 3* 3 8   CHLORIDE mmol/L 115* 112* 108   CO2 mmol/L 27 27 31   BUN mg/dL 9 9 12   CREATININE mg/dL 1 05 1 04 1 32*   CALCIUM mg/dL 8 5 8 4 8 4   ALK PHOS U/L  --  52  --    ALT U/L  --  14  --    AST U/L  --  17  --      Results from last 7 days   Lab Units 04/30/23  0435   MAGNESIUM mg/dL 2 5          Results from last 7 days   Lab Units 04/29/23  2044   INR  1 66*   PTT seconds 35               IMAGING & DIAGNOSTIC TESTING     Radiology Results: I have personally reviewed pertinent reports        XR chest portable   Final Result by Josef Torres MD (05/01 1322)      No acute cardiopulmonary disease  Workstation performed: [id number]         MRA head wo contrast   Final Result by Maritza Wilkins MD (05/01 0731)         1  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, stable  Neurovascular follow-up recommended on a nonemergent basis  2  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  Workstation performed: [id number]         MRA carotids wo contrast   Final Result by Maritza Wilkins MD (80/70 4539)         1  No hemodynamically significant stenosis in the major arteries of the neck on this motion degraded study  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 is better seen on the concurrent MRI of the brain  Workstation performed: [id number]         MRI brain wo contrast   Final Result by John Anand MD (04/30 9691)      Motion-degraded examination  No evidence for a new large acute vascular distribution infarct  Evolving right MCA distribution infarct with associated chronic hemosiderin deposition  No acute intracranial hemorrhage  Workstation performed: [id number]         CT facial bones without contrast   Final Result by Crow Burris MD (04/30 6455)      Age-indeterminate mildly depressed right anterior nasal bone fracture, nondisplaced left nasal bone fracture, and right mandibular coronoid process fracture  Mild bilateral inferior maxillary sinus disease  Workstation performed: [id number]         CT stroke alert brain   Final Result by Tobie Boas, MD (04/29 2040)      Old right MCA distribution infarcts  No evidence of acute intracranial hemorrhage  Chronic microangiopathic changes  Displaced right nasal fractures and nondisplaced left nasal fracture              I personally discussed this study with Dr Nj Olson on 4/29/2023 8:24 PM             Workstation performed: [id number]             Other Diagnostic Testing: I have personally reviewed pertinent reports        ACTIVE MEDICATIONS     Current Facility-Administered Medications   Medication Dose Route Frequency    acetaminophen (TYLENOL) tablet 650 mg  650 mg Oral Q6H PRN    albuterol (PROVENTIL HFA,VENTOLIN HFA) inhaler 2 puff  2 puff Inhalation Q6H PRN    aluminum-magnesium hydroxide-simethicone (MYLANTA) oral suspension 30 mL  30 mL Oral Q6H PRN    apixaban (ELIQUIS) tablet 5 mg  5 mg Oral BID    aspirin chewable tablet 81 mg  81 mg Oral Daily    atorvastatin (LIPITOR) tablet 40 mg  40 mg Oral QPM    buPROPion (WELLBUTRIN) tablet 100 mg  100 mg Oral BID    cinacalcet (SENSIPAR) tablet 30 mg  30 mg Oral Daily With Breakfast    DULoxetine (CYMBALTA) delayed release capsule 60 mg  60 mg Oral BID    gabapentin (NEURONTIN) capsule 100 mg  100 mg Oral BID    gabapentin (NEURONTIN) capsule 300 mg  300 mg Oral BID    hydroxychloroquine (PLAQUENIL) tablet 200 mg  200 mg Oral BID With Meals    lamoTRIgine (LaMICtal) tablet 150 mg  150 mg Oral HS    lidocaine (LIDODERM) 5 % patch 1 patch  1 patch Topical Daily    LORazepam (ATIVAN) tablet 0 5 mg  0 5 mg Oral Once PRN    magnesium Oxide (MAG-OX) tablet 800 mg  800 mg Oral BID    melatonin tablet 3 mg  3 mg Oral HS    metoclopramide (REGLAN) injection 10 mg  10 mg Intravenous Q6H PRN    metoprolol tartrate (LOPRESSOR) tablet 25 mg  25 mg Oral Q12H KHARI    morphine (MSIR) IR tablet 7 5 mg  7 5 mg Oral BID PRN    ondansetron (ZOFRAN) injection 4 mg  4 mg Intravenous Q6H PRN    pantoprazole (PROTONIX) EC tablet 40 mg  40 mg Oral Early Morning    polyethylene glycol (MIRALAX) packet 17 g  17 g Oral Daily PRN    prazosin (MINIPRESS) capsule 1 mg  1 mg Oral HS    senna-docusate sodium (SENOKOT S) 8 6-50 mg per tablet 1 tablet  1 tablet Oral BID    spironolactone (ALDACTONE) tablet 25 mg  25 mg Oral Daily       Prior to Admission medications    Medication Sig Start Date End Date Taking? Authorizing Provider   albuterol (PROVENTIL HFA,VENTOLIN HFA) 90 mcg/act inhaler Inhale 2 puffs every 6 (six) hours as needed for wheezing 4/10/23   Ant Paz MD   apixaban (ELIQUIS) 5 mg Take 1 tablet (5 mg total) by mouth 2 (two) times a day 4/10/23   Ant Paz MD   atorvastatin (LIPITOR) 40 mg tablet Take 1 tablet (40 mg total) by mouth every evening 4/10/23   Ant Paz MD   buPROPion [facility]) 100 mg tablet Take 100 mg by mouth 2 (two) times a day    Historical Provider, MD   cinacalcet (SENSIPAR) 30 mg tablet Take 1 tablet (30 mg total) by mouth daily with breakfast 4/10/23   Ant Paz MD   DULOXETINE HCL PO Take 60 mg by mouth 2 (two) times a day    Megan Amado DO   gabapentin (NEURONTIN) 100 mg capsule Take 1 capsule (100 mg total) by mouth 2 (two) times a day 4/14/23   Ananya Duran PA-C   gabapentin (NEURONTIN) 300 mg capsule Take 1 capsule (300 mg total) by mouth 2 (two) times a day 4/14/23   Ananya Duran PA-C   hydroxychloroquine (PLAQUENIL) 200 mg tablet Take 1 tablet (200mg) every Mon, Tues, Wed, Thurs, and Fri  Take 2 tablets (400mg) every Sat and Sun    Patient taking differently: 200 mg 2 (two) times a day with meals 12/29/22 6/28/23  Paradise Oneil MD   lamoTRIgine (LaMICtal) 150 MG tablet Take 1 tablet by mouth daily at bedtime pt taking 150mg daily at bedtime     Historical Provider, MD   lidocaine (LIDODERM) 5 % Apply 1 patch topically over 12 hours daily Remove & Discard patch within 12 hours or as directed by MD Do not start before March 25, 2023  3/25/23   Nicol Chavis DO   LORazepam (ATIVAN) 0 5 mg tablet Take 0 5 tablets (0 25 mg total) by mouth 2 (two) times a day as needed for anxiety 3/24/23   Nicol Chavis DO   magnesium oxide (MAG-OX) 400 mg tablet Take 2 tablets (800 mg total) by mouth 2 (two) times a day 4/10/23   Ant Paz MD   melatonin 3 mg Take 1 tablet (3 mg total) by mouth daily at bedtime 4/10/23   Ant Paz MD metoprolol tartrate (LOPRESSOR) 25 mg tablet Take 25 mg by mouth every 12 (twelve) hours    Historical Provider, MD   morphine (MSIR) 15 mg tablet Take 0 5 tablets (7 5 mg total) by mouth 2 (two) times a day as needed for severe pain or moderate pain Max Daily Amount: 15 mg 3/24/23   Amanda Delarosa DO   pantoprazole (PROTONIX) 40 mg tablet Take 1 tablet (40 mg total) by mouth daily in the early morning Do not start before March 25, 2023  3/25/23   Amanda Delarosa DO   polyethylene glycol (MIRALAX) 17 g packet Take 17 g by mouth daily as needed (Constipation)  Patient not taking: Reported on 4/14/2023 3/8/22   John Yeung PA-C   prazosin (MINIPRESS) 1 mg capsule Take 1 capsule (1 mg total) by mouth daily at bedtime 3/24/23   Amanda Delarosa DO   senna-docusate sodium (SENOKOT S) 8 6-50 mg per tablet Take 1 tablet by mouth 2 (two) times a day 4/10/23   Abraham Alonso MD   spironolactone (ALDACTONE) 25 mg tablet Take 1 tablet (25 mg total) by mouth daily 4/18/23 8/16/23  Cande Sheikh MD     ==  DO [facility] Neurology Residency, PGY-1

## 2023-05-02 ENCOUNTER — TELEPHONE (OUTPATIENT)
Dept: CARDIOLOGY CLINIC | Facility: CLINIC | Age: 68
End: 2023-05-02

## 2023-05-02 LAB
EST. AVERAGE GLUCOSE BLD GHB EST-MCNC: 94 MG/DL
HBA1C MFR BLD: 4.9 %

## 2023-05-02 NOTE — TELEPHONE ENCOUNTER
Pt recently in hospital 4/29-5/1 for CVA  On discharge Lipitor was stopped and Metoprolol was reduced to 12 5 mg  When she questioned the changes she was told to consult cardiology

## 2023-05-02 NOTE — PROGRESS NOTES
Follow-up note    Discussed with neurology - needs UA to check for UTI as cause for recrudescence of stroke like symptoms      UA - 4-10 WBC's    Discharged on Nitrofurantoin 100 mg BID x 5 days  Requested lab to place urine for culture - f/u results

## 2023-05-03 ENCOUNTER — HOME CARE VISIT (OUTPATIENT)
Dept: HOME HEALTH SERVICES | Facility: HOME HEALTHCARE | Age: 68
End: 2023-05-03

## 2023-05-03 PROBLEM — G47.34 NOCTURNAL HYPOXIA: Status: ACTIVE | Noted: 2023-05-01

## 2023-05-04 ENCOUNTER — HOME CARE VISIT (OUTPATIENT)
Dept: HOME HEALTH SERVICES | Facility: HOME HEALTHCARE | Age: 68
End: 2023-05-04

## 2023-05-04 VITALS
HEART RATE: 98 BPM | TEMPERATURE: 97.6 F | SYSTOLIC BLOOD PRESSURE: 110 MMHG | OXYGEN SATURATION: 93 % | DIASTOLIC BLOOD PRESSURE: 68 MMHG | RESPIRATION RATE: 18 BRPM

## 2023-05-05 PROBLEM — N39.0 UTI (URINARY TRACT INFECTION): Status: RESOLVED | Noted: 2023-03-06 | Resolved: 2023-05-05

## 2023-05-08 ENCOUNTER — HOME CARE VISIT (OUTPATIENT)
Dept: HOME HEALTH SERVICES | Facility: HOME HEALTHCARE | Age: 68
End: 2023-05-08

## 2023-05-08 VITALS — SYSTOLIC BLOOD PRESSURE: 118 MMHG | HEART RATE: 94 BPM | DIASTOLIC BLOOD PRESSURE: 62 MMHG | OXYGEN SATURATION: 93 %

## 2023-05-09 ENCOUNTER — HOME CARE VISIT (OUTPATIENT)
Dept: HOME HEALTH SERVICES | Facility: HOME HEALTHCARE | Age: 68
End: 2023-05-09

## 2023-05-09 NOTE — CASE COMMUNICATION
Speech therapy reassessment completed  Will continue to follow for dysphagia and cognitive linguistic deficits  POC: 2 wk 4 or until appropriate for transition to OP therapy

## 2023-05-09 NOTE — CASE COMMUNICATION
Arrived  for  pre scheduled  visit  Pateint  reports  being  quite  tired  and  not  feeling  well  Patient  declined  visit  for  today    Visits  re scheduled  for  Wednesday  5/10

## 2023-05-10 ENCOUNTER — HOME CARE VISIT (OUTPATIENT)
Dept: HOME HEALTH SERVICES | Facility: HOME HEALTHCARE | Age: 68
End: 2023-05-10

## 2023-05-10 VITALS
DIASTOLIC BLOOD PRESSURE: 72 MMHG | OXYGEN SATURATION: 99 % | SYSTOLIC BLOOD PRESSURE: 114 MMHG | RESPIRATION RATE: 20 BRPM | TEMPERATURE: 97 F | HEART RATE: 90 BPM

## 2023-05-10 NOTE — CASE COMMUNICATION
OT to continue OT Services 2x per for 3 weeks for further review/progression of therapeutic exercises for upper extremity ROM, strengthening and fine motor exercises,  ADL training, review of safety with transfers/mobility and recommendations for DME  to maximizes safety and participation with self care/daily activities

## 2023-05-11 ENCOUNTER — HOME CARE VISIT (OUTPATIENT)
Dept: HOME HEALTH SERVICES | Facility: HOME HEALTHCARE | Age: 68
End: 2023-05-11

## 2023-05-11 VITALS — DIASTOLIC BLOOD PRESSURE: 72 MMHG | OXYGEN SATURATION: 98 % | SYSTOLIC BLOOD PRESSURE: 114 MMHG | HEART RATE: 90 BPM

## 2023-05-12 ENCOUNTER — HOME CARE VISIT (OUTPATIENT)
Dept: HOME HEALTH SERVICES | Facility: HOME HEALTHCARE | Age: 68
End: 2023-05-12

## 2023-05-13 VITALS — HEART RATE: 76 BPM | OXYGEN SATURATION: 97 %

## 2023-05-15 ENCOUNTER — HOME CARE VISIT (OUTPATIENT)
Dept: HOME HEALTH SERVICES | Facility: HOME HEALTHCARE | Age: 68
End: 2023-05-15

## 2023-05-15 ENCOUNTER — OFFICE VISIT (OUTPATIENT)
Dept: NEUROSURGERY | Facility: CLINIC | Age: 68
End: 2023-05-15

## 2023-05-15 VITALS — HEART RATE: 83 BPM | SYSTOLIC BLOOD PRESSURE: 94 MMHG | OXYGEN SATURATION: 95 % | DIASTOLIC BLOOD PRESSURE: 59 MMHG

## 2023-05-15 VITALS — BODY MASS INDEX: 22.18 KG/M2 | HEIGHT: 63 IN

## 2023-05-15 DIAGNOSIS — I67.1 CEREBRAL ANEURYSM, NONRUPTURED: ICD-10-CM

## 2023-05-15 DIAGNOSIS — I63.9 STROKE (HCC): Primary | ICD-10-CM

## 2023-05-15 RX ORDER — DULOXETIN HYDROCHLORIDE 60 MG/1
60 CAPSULE, DELAYED RELEASE ORAL 2 TIMES DAILY
COMMUNITY
Start: 2023-05-08

## 2023-05-15 RX ORDER — GABAPENTIN 400 MG/1
400 CAPSULE ORAL 2 TIMES DAILY
COMMUNITY
Start: 2023-05-08

## 2023-05-15 RX ORDER — ONDANSETRON 4 MG/1
TABLET, ORALLY DISINTEGRATING ORAL
COMMUNITY
Start: 2023-05-12

## 2023-05-15 RX ORDER — BUPROPION HYDROCHLORIDE 300 MG/1
TABLET ORAL
COMMUNITY
Start: 2023-04-21 | End: 2023-05-15

## 2023-05-15 NOTE — PROGRESS NOTES
Patient Id: Kristan Favre is a 75 y o  female        Handedness: Right      Assessment/Plan:    Diagnoses and all orders for this visit:    Stroke [facility])    Cerebral aneurysm, nonruptured  -     MRI angiogram head without contrast; Future    Discussion and summary:  1  Right MCA stroke status post thrombectomy, 12/12/2023  TICI 3 revascularization after MCA thrombectomy  Initial NIHSS was 13  She has had a significant recovery, however her modified Will score remains a 4  She is able to walk with a walker at home but unable to fully care for herself  Etiology of stroke likely due to cardiomyopathy and heart failure  She is currently on Eliquis  2   Incidental right superior hypophyseal artery aneurysm  This is approximately 3 mm in size  We discussed natural history and diagnosis of intracranial aneurysms as well as the risk of subarachnoid hemorrhage  Based on the size and location of this aneurysm I would recommend 1 year of conservative observation and follow-up with an MRA in a year  Chief Complaint: Follow-up        HPI:   This is an unfortunate 79-year-old female with a history of chronic pain and heart failure who presented with a right MCA syndrome that acutely worsened in February  She underwent thrombectomy successfully  Vascularization was TICI 3  Unfortunately she continues to have significant left-sided deficits and requires the assistance of either a walker or wheelchair  She was unable to fully care for herself  She is currently on Eliquis  Review of systems obtained by the MA reviewed and updated below  Review of Systems   Constitutional: Negative  HENT: Positive for tinnitus (occasionally)  Eyes: Negative  Respiratory: Positive for cough and shortness of breath (on exertion)  Negative for chest tightness  Cardiovascular: Negative  Gastrointestinal: Negative  Endocrine: Negative  Genitourinary: Negative      Musculoskeletal: Positive for back pain (constant pain scale 6-7- shooting pain up and down back), gait problem (pt in wheel chair), neck pain (constant pain scale 8) and neck stiffness  Skin: Negative  Allergic/Immunologic: Negative  Neurological: Positive for speech difficulty (trouble finding words and slurred speech), weakness (left side), numbness (contant- left hand) and headaches (comes and goes)  Negative for dizziness, tremors, seizures and light-headedness  Hematological: Bruises/bleeds easily (medication)  Psychiatric/Behavioral: Positive for sleep disturbance  The patient is nervous/anxious  Physical Exam  Vitals:    05/15/23 1023   BP: (P) 100/58   Temp: (P) 97 7 °F (36 5 °C)   She is well appearing  Affect is appropriate  Body mass index is 22 18 kg/m²  Farzana Sitter She is awake alert and oriented  Hearing and vision are grossly intact  Her pupils are equal round reactive to light  Her extraocular movements are intact  Her face is symmetric  Tongue is midline  Facial sensation is intact and symmetric throughout  Shoulder shrug is 5/5  She has full strength on her right side  She has significant paracystic of her left approximately 3 out of 5 in strength of her upper and lower extremity  Her heart rate is regular  Normal respiratory effort  Abdomen nondistended  Radial pulses 1+       The following portions of the patient's history were reviewed and updated as appropriate: allergies, current medications, past family history, past medical history, past social history, past surgical history and problem list     Active Ambulatory Problems     Diagnosis Date Noted   • Chronic pain syndrome    • Mild intermittent asthma without complication    • Ambulatory dysfunction    • Seronegative arthropathy of multiple sites [facility])    • Gastroesophageal reflux disease without esophagitis    • Mixed stress and urge urinary incontinence    • CHF (congestive heart failure) ([facility] [address] )    • Anemia 06/26/2021   • Undifferentiated connective tissue disease ([facility] ) 12/29/2021   • Primary generalized (osteo)arthritis 12/29/2021   • Peripheral neuropathy 12/29/2021   • Osteoporosis 12/29/2021   • Hx of compression fracture of spine 12/29/2021   • Lumbar spondylosis 12/29/2021   • Cervical spondylosis 12/29/2021   • Closed fracture of multiple ribs of right side 03/02/2022   • Traumatic pneumothorax 03/02/2022   • Closed fracture of transverse process of lumbar vertebra ([facility] ) 03/02/2022   • Liver contusion 03/02/2022   • Fall 03/02/2022   • Acute pain due to trauma 03/02/2022   • Right shoulder pain 03/02/2022   • Severe protein-calorie malnutrition ([facility] ) 03/02/2022   • Bipolar depression ([place] [address] ) 03/04/2022   • Rheumatoid arthritis ([place] [address] ) 03/04/2022   • 'light-for-dates' infant with signs of fetal malnutrition 04/22/2022   • LAILA (acute kidney injury) ([place] [address] ) 04/23/2022   • Polypharmacy 04/23/2022   • Cardiomyopathy ([place] [address] ) 02/07/2023   • Vitamin D insufficiency 02/07/2023   • Stroke ([place] [address] ) 02/12/2023   • HTN (hypertension) 02/12/2023   • HFrEF (heart failure with reduced ejection fraction) ([place] [address] ) 02/13/2023   • Hypercalcemia 02/22/2023   • Discoloration of skin of foot 02/22/2023   • Abdominal pain 02/22/2023   • Acute CVA (cerebrovascular accident) ([place] [address] ) 02/23/2023   • History of bacteremia 02/24/2023   • Obstructive sleep apnea 02/28/2023   • History of stroke 02/28/2023   • AMS (altered mental status) 03/09/2023   • Gout of foot 03/09/2023   • Dysphagia 03/09/2023   • Wound of left ankle 03/09/2023   • Hypokalemia 03/10/2023   • Sinus tachycardia 03/29/2023   • Hypomagnesemia 03/30/2023   • Insomnia 04/03/2023   • Nocturnal hypoxia 05/01/2023   • Stroke-like symptoms 05/01/2023   • Cerebral aneurysm, nonruptured 05/15/2023     Resolved Ambulatory Problems     Diagnosis Date Noted   • Acute metabolic encephalopathy    • LAILA (acute kidney injury) ([facility] [address] )    • Acute cystitis without hematuria    • UTI (urinary tract infection) 03/06/2023     Past Medical History: Diagnosis Date   • Anxiety    • Asthma    • Chronic narcotic dependence ([facility] [address] )    • Chronic pain    • Depression    • Enterovirus heart infection    • SHAR (obstructive sleep apnea)    • Osteoarthritis    • Plantar fasciitis of right foot    • Senile osteoporosis        Past Surgical History:   Procedure Laterality Date   • BREAST IMPLANT     • FIXATION KYPHOPLASTY LUMBAR SPINE     • HYSTERECTOMY     • INCONTINENCE SURGERY N/A    • IR STROKE ALERT  2/12/2023   • NASAL SEPTOPLASTY W/ TURBINOPLASTY N/A    • RECTAL PROLAPSE REPAIR N/A    • REPAIR RECTOCELE     • RHINOPLASTY N/A          Current Outpatient Medications:   •  albuterol (PROVENTIL HFA,VENTOLIN HFA) 90 mcg/act inhaler, Inhale 2 puffs every 6 (six) hours as needed for wheezing, Disp: 18 g, Rfl: 0  •  apixaban (ELIQUIS) 5 mg, Take 1 tablet (5 mg total) by mouth 2 (two) times a day, Disp: 60 tablet, Rfl: 0  •  buPROPion (WELLBUTRIN) 100 mg tablet, Take 100 mg by mouth 2 (two) times a day, Disp: , Rfl:   •  CANNABIDIOL PO, Medical marijuana prn pain, Disp: , Rfl:   •  cinacalcet (SENSIPAR) 30 mg tablet, Take 1 tablet (30 mg total) by mouth daily with breakfast, Disp: 30 tablet, Rfl: 0  •  DULoxetine (CYMBALTA) 60 mg delayed release capsule, Take 60 mg by mouth 2 (two) times a day, Disp: , Rfl:   •  gabapentin (NEURONTIN) 400 mg capsule, Take 400 mg by mouth 2 (two) times a day, Disp: , Rfl:   •  hydroxychloroquine (PLAQUENIL) 200 mg tablet, Take 1 tablet (200mg) every Mon, Tues, Wed, Thurs, and Fri   Take 2 tablets (400mg) every Sat and Sun  (Patient taking differently: 200 mg 2 (two) times a day with meals), Disp: 40 tablet, Rfl: 2  •  lamoTRIgine (LaMICtal) 150 MG tablet, Take 1 tablet by mouth daily at bedtime pt taking 150mg daily at bedtime , Disp: , Rfl:   •  lidocaine (LIDODERM) 5 %, Apply 1 patch topically over 12 hours daily Remove & Discard patch within 12 hours or as directed by MD Do not start before March 25, 2023 , Disp: , Rfl: 0  •  LORazepam (ATIVAN) 0 5 mg tablet, Take 0 5 tablets (0 25 mg total) by mouth 2 (two) times a day as needed for anxiety, Disp: 20 tablet, Rfl: 0  •  magnesium oxide (MAG-OX) 400 mg tablet, Take 2 tablets (800 mg total) by mouth 2 (two) times a day, Disp: 120 tablet, Rfl: 0  •  melatonin 3 mg, Take 1 tablet (3 mg total) by mouth daily at bedtime, Disp: 30 tablet, Rfl: 0  •  metoprolol tartrate (LOPRESSOR) 25 mg tablet, Take 0 5 tablets (12 5 mg total) by mouth every 12 (twelve) hours, Disp: , Rfl: 0  •  morphine (MSIR) 15 mg tablet, Take 0 5 tablets (7 5 mg total) by mouth 2 (two) times a day as needed for severe pain or moderate pain Max Daily Amount: 15 mg, Disp: 30 tablet, Rfl: 0  •  ondansetron (ZOFRAN-ODT) 4 mg disintegrating tablet, PLACE 1 TABLET ON TOP OF THE TONGUE AND ALLOW TO DISSOLVE EVERY 8 HOURS AS NEEDED, Disp: , Rfl:   •  pantoprazole (PROTONIX) 40 mg tablet, Take 1 tablet (40 mg total) by mouth daily in the early morning Do not start before March 25, 2023 , Disp: , Rfl: 0  •  polyethylene glycol (MIRALAX) 17 g packet, Take 17 g by mouth daily as needed (Constipation), Disp: , Rfl:   •  prazosin (MINIPRESS) 1 mg capsule, Take 1 capsule (1 mg total) by mouth daily at bedtime, Disp: , Rfl: 0  •  spironolactone (ALDACTONE) 25 mg tablet, Take 1 tablet (25 mg total) by mouth daily, Disp: 30 tablet, Rfl: 5  •  senna-docusate sodium (SENOKOT S) 8 6-50 mg per tablet, Take 1 tablet by mouth 2 (two) times a day (Patient not taking: Reported on 5/15/2023), Disp: 60 tablet, Rfl: 0    Results/Data: Imaging reviewed in detail with patient as well as reports

## 2023-05-16 ENCOUNTER — HOME CARE VISIT (OUTPATIENT)
Dept: HOME HEALTH SERVICES | Facility: HOME HEALTHCARE | Age: 68
End: 2023-05-16

## 2023-05-16 DIAGNOSIS — E83.42 HYPOMAGNESEMIA: ICD-10-CM

## 2023-05-16 NOTE — CASE COMMUNICATION
Patient  declined  visit  for  today  secondary  to  increased  fatigue  and  having  a  neurology  appointment  this  afternoon    Plan  to  re visit  on  WednesdAY 5/17

## 2023-05-17 ENCOUNTER — DOCUMENTATION (OUTPATIENT)
Dept: CARDIOLOGY CLINIC | Facility: CLINIC | Age: 68
End: 2023-05-17

## 2023-05-17 ENCOUNTER — HOME CARE VISIT (OUTPATIENT)
Dept: HOME HEALTH SERVICES | Facility: HOME HEALTHCARE | Age: 68
End: 2023-05-17

## 2023-05-17 VITALS — HEART RATE: 85 BPM | DIASTOLIC BLOOD PRESSURE: 59 MMHG | SYSTOLIC BLOOD PRESSURE: 96 MMHG

## 2023-05-17 NOTE — PROGRESS NOTES
Extensive discussion 5/17/23 10:30am with Tra St, the patient's daughter regarding switching from eliquis to another [facility] agent 2/2 high cost  Recent neurosurg and PCP recs and eliquis samples per PCP noted and appreciated  Tra St and I discussed risks/benefits of AC in general and switching agents in her particular case given her intracranial aneurysm, recent significant stroke w petechial hemorrhage, uncomplicated tolerance of eliquis so far  They understand that there is no 0 risk option and any scenario is balance here  She affirms patient's desire to avoid coumadin  At present she has eliquis samples for possibly 2 months she says; she will continue to use these until our upcoming visit 6/29/23 which will give her and her family more time to consider options of pradaxa and look into xeralto cost  They will also consider the lower dose option for pradaxa  She will call our office if anything changes in next month before our visit, or if they wish to expedite the switch 
no rash/no itching

## 2023-05-18 ENCOUNTER — HOME CARE VISIT (OUTPATIENT)
Dept: HOME HEALTH SERVICES | Facility: HOME HEALTHCARE | Age: 68
End: 2023-05-18

## 2023-05-18 VITALS — HEART RATE: 82 BPM | OXYGEN SATURATION: 96 %

## 2023-05-22 ENCOUNTER — HOME CARE VISIT (OUTPATIENT)
Dept: HOME HEALTH SERVICES | Facility: HOME HEALTHCARE | Age: 68
End: 2023-05-22

## 2023-05-22 VITALS — HEART RATE: 93 BPM | DIASTOLIC BLOOD PRESSURE: 63 MMHG | OXYGEN SATURATION: 95 % | SYSTOLIC BLOOD PRESSURE: 95 MMHG

## 2023-05-23 ENCOUNTER — HOME CARE VISIT (OUTPATIENT)
Dept: HOME HEALTH SERVICES | Facility: HOME HEALTHCARE | Age: 68
End: 2023-05-23

## 2023-05-23 VITALS — OXYGEN SATURATION: 96 % | HEART RATE: 86 BPM

## 2023-05-23 DIAGNOSIS — I10 PRIMARY HYPERTENSION: ICD-10-CM

## 2023-05-23 DIAGNOSIS — I50.9 CHRONIC CONGESTIVE HEART FAILURE, UNSPECIFIED HEART FAILURE TYPE (HCC): Primary | ICD-10-CM

## 2023-05-23 RX ORDER — METOPROLOL SUCCINATE 25 MG/1
25 TABLET, EXTENDED RELEASE ORAL DAILY
Qty: 90 TABLET | Refills: 5 | Status: SHIPPED | OUTPATIENT
Start: 2023-05-23 | End: 2024-11-13

## 2023-05-24 ENCOUNTER — HOME CARE VISIT (OUTPATIENT)
Dept: HOME HEALTH SERVICES | Facility: HOME HEALTHCARE | Age: 68
End: 2023-05-24

## 2023-05-24 NOTE — CASE COMMUNICATION
[facility] contacted OT to notify OT that patient was hospitalized at [facility] and would not be available for scheduled  OT discharge  visit for today    OT will further consult with patient  following discharge from hospital

## 2023-05-24 NOTE — CASE COMMUNICATION
Received phone call from pt's daughter stating that pt was taken to [facility] yesterday due to difficulty breathing and was admitted with bilaeral pneumonia

## 2023-06-01 ENCOUNTER — HOME CARE VISIT (OUTPATIENT)
Dept: HOME HEALTH SERVICES | Facility: HOME HEALTHCARE | Age: 68
End: 2023-06-01

## 2023-06-01 NOTE — CASE COMMUNICATION
Received text from pt's daughter Evelin Louis that pt was discharged from [address] [place] [address] [address] on 5/26/23 and inquiring if [facility]EL [facility] was notified  Contacted intake and no resumption of care orders were received as they were possibly sent to [facility] per discharge note  It does not appear that pt was seen by [address] did not indicate that she has been contacted  After discussing with intake, sent message to Evelin Louis that she shou ld either call  from [facility] to request resumption orders be sent to MICHAEL HALL or contact PCP to request resumption orders  No reply received as of yet

## 2023-06-06 PROBLEM — M06.09 RHEUMATOID ARTHRITIS OF MULTIPLE SITES WITH NEGATIVE RHEUMATOID FACTOR (HCC): Status: ACTIVE | Noted: 2022-03-04

## 2023-06-07 ENCOUNTER — OFFICE VISIT (OUTPATIENT)
Dept: URGENT CARE | Age: 68
End: 2023-06-07
Payer: MEDICARE

## 2023-06-07 VITALS — SYSTOLIC BLOOD PRESSURE: 110 MMHG | HEART RATE: 76 BPM | TEMPERATURE: 97.4 F | DIASTOLIC BLOOD PRESSURE: 70 MMHG

## 2023-06-07 DIAGNOSIS — R30.0 DYSURIA: ICD-10-CM

## 2023-06-07 DIAGNOSIS — N39.0 URINARY TRACT INFECTION WITHOUT HEMATURIA, SITE UNSPECIFIED: Primary | ICD-10-CM

## 2023-06-07 LAB
SL AMB  POCT GLUCOSE, UA: ABNORMAL
SL AMB LEUKOCYTE ESTERASE,UA: ABNORMAL
SL AMB POCT BILIRUBIN,UA: ABNORMAL
SL AMB POCT BLOOD,UA: NEGATIVE
SL AMB POCT CLARITY,UA: ABNORMAL
SL AMB POCT COLOR,UA: YELLOW
SL AMB POCT KETONES,UA: ABNORMAL
SL AMB POCT NITRITE,UA: NEGATIVE
SL AMB POCT PH,UA: 7.5
SL AMB POCT SPECIFIC GRAVITY,UA: 1.03
SL AMB POCT URINE PROTEIN: ABNORMAL
SL AMB POCT UROBILINOGEN: 0.2

## 2023-06-07 PROCEDURE — 87086 URINE CULTURE/COLONY COUNT: CPT | Performed by: NURSE PRACTITIONER

## 2023-06-07 PROCEDURE — 99213 OFFICE O/P EST LOW 20 MIN: CPT | Performed by: NURSE PRACTITIONER

## 2023-06-07 PROCEDURE — G0463 HOSPITAL OUTPT CLINIC VISIT: HCPCS | Performed by: NURSE PRACTITIONER

## 2023-06-07 PROCEDURE — 81002 URINALYSIS NONAUTO W/O SCOPE: CPT | Performed by: NURSE PRACTITIONER

## 2023-06-07 RX ORDER — NITROFURANTOIN 25; 75 MG/1; MG/1
100 CAPSULE ORAL 2 TIMES DAILY
Qty: 14 CAPSULE | Refills: 0 | Status: SHIPPED | OUTPATIENT
Start: 2023-06-07 | End: 2023-06-14

## 2023-06-07 NOTE — PATIENT INSTRUCTIONS
Urine dip was positive for infection today and sent for a culture,   Call in two to three days for urine culture results    Don't wear contacts when taking AZO OTC or pyridium perscription due to side effects  Increased fluids  Take meds as directed     Empty bladder often  Follow up with pcp  If symptoms get worse, go to ER for further evaluation  Due to multiple allergies Macrobid was ordered  Follow-up with your family doctor

## 2023-06-07 NOTE — PROGRESS NOTES
NAME: Kimberlee Khan is a 70 y o  female  : 1955    MRN: [id number]    /70   Pulse 76   Temp (!) 97 4 °F (36 3 °C) (Tympanic)     7:32 PM    Assessment and Plan   Urinary tract infection without hematuria, site unspecified [N39 0]  1  Urinary tract infection without hematuria, site unspecified  nitrofurantoin (MACROBID) 100 mg capsule    Urine culture    POCT urine dip      2  Dysuria  nitrofurantoin (MACROBID) 100 mg capsule    Urine culture    POCT urine dip          Larisa was seen today for possible uti  Diagnoses and all orders for this visit:    Urinary tract infection without hematuria, site unspecified  -     nitrofurantoin (MACROBID) 100 mg capsule; Take 1 capsule (100 mg total) by mouth 2 (two) times a day for 7 days  -     Urine culture  -     POCT urine dip    Dysuria  -     nitrofurantoin (MACROBID) 100 mg capsule; Take 1 capsule (100 mg total) by mouth 2 (two) times a day for 7 days  -     Urine culture  -     POCT urine dip        Patient Instructions   Patient Instructions   Urine dip was positive for infection today and sent for a culture,   Call in two to three days for urine culture results    Don't wear contacts when taking AZO OTC or pyridium perscription due to side effects  Increased fluids  Take meds as directed     Empty bladder often  Follow up with pcp  If symptoms get worse, go to ER for further evaluation  Due to multiple allergies Macrobid was ordered  Follow-up with your family doctor  Proceed to the nearest ER if symptoms worsen, Follow up with your PCP  Continue to social distance, wash your hands, and wear your masks  Please continue to follow the CDC  gov guidelines daily for they are subject to change on COVID-19    Chief Complaint     Chief Complaint   Patient presents with   • Possible UTI     Burning with urination for 1 day            History of Present Illness     62-year-old female here today with her son Ivis Saunders at bedside with complaints of UTI symptoms  She had UTI symptoms since this morning increased burning with urination and low suprapubic pain  She has had UTIs in the past where she waited too long and ended up in the hospital   Patient has no fever however does have a headache  Vital signs stable  Patient has multiple allergies to cephalosporins sulfa erythromycin penicillin and trimethoprim  Pt was able to obtain a urine sample by using a HAT in the restroom  Review of Systems   Review of Systems   Constitutional: Negative for fatigue and fever  HENT: Negative  Negative for congestion, ear discharge, ear pain, postnasal drip, sneezing and sore throat  Eyes: Negative  Respiratory: Negative  Cardiovascular: Negative  Gastrointestinal: Negative  Genitourinary: Positive for dysuria, frequency, hematuria and urgency  Negative for difficulty urinating, flank pain, vaginal bleeding, vaginal discharge and vaginal pain  Musculoskeletal: Negative  Skin: Negative  Neurological: Positive for headaches           Current Medications       Current Outpatient Medications:   •  nitrofurantoin (MACROBID) 100 mg capsule, Take 1 capsule (100 mg total) by mouth 2 (two) times a day for 7 days, Disp: 14 capsule, Rfl: 0  •  albuterol (PROVENTIL HFA,VENTOLIN HFA) 90 mcg/act inhaler, Inhale 2 puffs every 6 (six) hours as needed for wheezing, Disp: 18 g, Rfl: 0  •  apixaban (ELIQUIS) 5 mg, Take 1 tablet (5 mg total) by mouth 2 (two) times a day, Disp: 60 tablet, Rfl: 0  •  buPROPion (WELLBUTRIN) 100 mg tablet, Take 100 mg by mouth 2 (two) times a day, Disp: , Rfl:   •  CANNABIDIOL PO, Medical marijuana prn pain, Disp: , Rfl:   •  cinacalcet (SENSIPAR) 30 mg tablet, Take 1 tablet (30 mg total) by mouth daily with breakfast, Disp: 30 tablet, Rfl: 0  •  DULoxetine (CYMBALTA) 60 mg delayed release capsule, Take 60 mg by mouth 2 (two) times a day, Disp: , Rfl:   •  gabapentin (NEURONTIN) 400 mg capsule, Take 400 mg by mouth 2 (two) times a day, Disp: , Rfl:   •  hydroxychloroquine (PLAQUENIL) 200 mg tablet, Take 1 tablet (200 mg total) by mouth 2 (two) times a day with meals, Disp: 180 tablet, Rfl: 1  •  lamoTRIgine (LaMICtal) 150 MG tablet, Take 1 tablet by mouth daily at bedtime pt taking 150mg daily at bedtime , Disp: , Rfl:   •  lidocaine (LIDODERM) 5 %, Apply 1 patch topically over 12 hours daily Remove & Discard patch within 12 hours or as directed by MD Do not start before March 25, 2023 , Disp: , Rfl: 0  •  LORazepam (ATIVAN) 0 5 mg tablet, Take 0 5 tablets (0 25 mg total) by mouth 2 (two) times a day as needed for anxiety, Disp: 20 tablet, Rfl: 0  •  magnesium oxide (MAG-OX) 400 mg tablet, Take 2 tablets (800 mg total) by mouth 2 (two) times a day, Disp: 120 tablet, Rfl: 0  •  melatonin 3 mg, Take 1 tablet (3 mg total) by mouth daily at bedtime, Disp: 30 tablet, Rfl: 0  •  metoprolol succinate (TOPROL-XL) 25 mg 24 hr tablet, Take 1 tablet (25 mg total) by mouth daily, Disp: 90 tablet, Rfl: 5  •  morphine (MSIR) 15 mg tablet, Take 0 5 tablets (7 5 mg total) by mouth 2 (two) times a day as needed for severe pain or moderate pain Max Daily Amount: 15 mg, Disp: 30 tablet, Rfl: 0  •  ondansetron (ZOFRAN-ODT) 4 mg disintegrating tablet, PLACE 1 TABLET ON TOP OF THE TONGUE AND ALLOW TO DISSOLVE EVERY 8 HOURS AS NEEDED (Patient not taking: Reported on 6/6/2023), Disp: , Rfl:   •  pantoprazole (PROTONIX) 40 mg tablet, Take 1 tablet (40 mg total) by mouth daily in the early morning Do not start before March 25, 2023 , Disp: , Rfl: 0  •  polyethylene glycol (MIRALAX) 17 g packet, Take 17 g by mouth daily as needed (Constipation) (Patient not taking: Reported on 6/6/2023), Disp: , Rfl:   •  prazosin (MINIPRESS) 1 mg capsule, Take 1 capsule (1 mg total) by mouth daily at bedtime, Disp: , Rfl: 0  •  senna-docusate sodium (SENOKOT S) 8 6-50 mg per tablet, Take 1 tablet by mouth 2 (two) times a day (Patient not taking: Reported on 5/15/2023), Disp: 60 tablet, Rfl: 0  •  spironolactone (ALDACTONE) 25 mg tablet, Take 1 tablet (25 mg total) by mouth daily, Disp: 30 tablet, Rfl: 5    Current Allergies     Allergies as of 06/07/2023 - Reviewed 06/07/2023   Allergen Reaction Noted   • Cephalosporins Other (See Comments) 09/11/2007   • Dye [iodinated contrast media] Anaphylaxis 09/14/2022   • Iodine - food allergy Anaphylaxis 09/11/2007   • Methocarbamol Hives 04/14/2015   • Sulfa antibiotics Hives 03/12/2015   • Bactrim [sulfamethoxazole-trimethoprim] Hives 09/14/2022   • Erythromycin Hives 09/14/2022   • Penicillins  11/17/2016   • Shrimp extract allergy skin test - food allergy Other (See Comments) 05/15/2023   • Trimethoprim Hives 05/15/2023              Past Medical History:   Diagnosis Date   • Anxiety    • Asthma    • Bipolar depression ([address] )    • Chronic narcotic dependence ([address] )    • Chronic pain    • Depression    • Enterovirus heart infection    • SHAR (obstructive sleep apnea)    • Osteoarthritis    • Peripheral neuropathy    • Plantar fasciitis of right foot    • Senile osteoporosis    • Seronegative arthropathy of multiple sites ([address] )    • Undifferentiated connective tissue disease ([address] )        Past Surgical History:   Procedure Laterality Date   • BREAST IMPLANT     • FIXATION KYPHOPLASTY LUMBAR SPINE     • HYSTERECTOMY     • INCONTINENCE SURGERY N/A    • IR STROKE ALERT  2/12/2023   • NASAL SEPTOPLASTY W/ TURBINOPLASTY N/A    • RECTAL PROLAPSE REPAIR N/A    • REPAIR RECTOCELE     • RHINOPLASTY N/A        Family History   Problem Relation Age of Onset   • No Known Problems Mother    • No Known Problems Father    • Arthritis Family    • Diabetes Family    • Stroke Family          Medications have been verified      The following portions of the patient's history were reviewed and updated as appropriate: allergies, current medications, past family history, past medical history, past social history, past surgical history and problem list     Objective   BP "110/70   Pulse 76   Temp (!) 97 4 °F (36 3 °C) (Tympanic)      Physical Exam     Physical Exam  Constitutional:       Appearance: Normal appearance  HENT:      Head: Normocephalic  Cardiovascular:      Rate and Rhythm: Normal rate and regular rhythm  Pulmonary:      Effort: Pulmonary effort is normal       Breath sounds: Normal breath sounds and air entry  No decreased breath sounds, wheezing or rhonchi  Abdominal:      General: Abdomen is flat  Bowel sounds are normal       Palpations: Abdomen is soft  Tenderness: There is abdominal tenderness in the suprapubic area  There is no right CVA tenderness, left CVA tenderness, guarding or rebound  Musculoskeletal:      Comments: Pt overall is hunched over using her walker to ambulate  Skin:     General: Skin is dry  Capillary Refill: Capillary refill takes less than 2 seconds  Neurological:      General: No focal deficit present  Mental Status: She is alert and oriented to person, place, and time  Note: Portions of this record may have been created with voice recognition software  Occasional wrong word or \"sound a like\" substitutions may have occurred due to the inherent limitations of voice recognition software  Please read the chart carefully and recognize, using context, where substitutions have occurred  LAURA Davis  "

## 2023-06-09 ENCOUNTER — HOME CARE VISIT (OUTPATIENT)
Dept: HOME HEALTH SERVICES | Facility: HOME HEALTHCARE | Age: 68
End: 2023-06-09

## 2023-06-09 ENCOUNTER — HOME HEALTH ADMISSION (OUTPATIENT)
Dept: HOME HEALTH SERVICES | Facility: HOME HEALTHCARE | Age: 68
End: 2023-06-09
Payer: MEDICARE

## 2023-06-09 ENCOUNTER — HOME CARE VISIT (OUTPATIENT)
Dept: HOME HEALTH SERVICES | Facility: HOME HEALTHCARE | Age: 68
End: 2023-06-09
Payer: MEDICARE

## 2023-06-09 LAB — BACTERIA UR CULT: NORMAL

## 2023-06-12 ENCOUNTER — HOME CARE VISIT (OUTPATIENT)
Dept: HOME HEALTH SERVICES | Facility: HOME HEALTHCARE | Age: 68
End: 2023-06-12
Payer: MEDICARE

## 2023-06-12 ENCOUNTER — HOME CARE VISIT (OUTPATIENT)
Dept: HOME HEALTH SERVICES | Facility: HOME HEALTHCARE | Age: 68
End: 2023-06-12

## 2023-06-12 VITALS — SYSTOLIC BLOOD PRESSURE: 122 MMHG | DIASTOLIC BLOOD PRESSURE: 70 MMHG | HEART RATE: 72 BPM | OXYGEN SATURATION: 98 %

## 2023-06-12 PROCEDURE — G0151 HHCP-SERV OF PT,EA 15 MIN: HCPCS

## 2023-06-12 PROCEDURE — 10330081 VN NO-PAY CLAIM PROCEDURE

## 2023-06-12 PROCEDURE — 400013 VN SOC

## 2023-06-13 ENCOUNTER — HOME CARE VISIT (OUTPATIENT)
Dept: HOME HEALTH SERVICES | Facility: HOME HEALTHCARE | Age: 68
End: 2023-06-13

## 2023-06-13 ENCOUNTER — HOME CARE VISIT (OUTPATIENT)
Dept: HOME HEALTH SERVICES | Facility: HOME HEALTHCARE | Age: 68
End: 2023-06-13
Payer: MEDICARE

## 2023-06-13 PROCEDURE — G0153 HHCP-SVS OF S/L PATH,EA 15MN: HCPCS

## 2023-06-14 ENCOUNTER — HOME CARE VISIT (OUTPATIENT)
Dept: HOME HEALTH SERVICES | Facility: HOME HEALTHCARE | Age: 68
End: 2023-06-14
Payer: MEDICARE

## 2023-06-14 PROCEDURE — G0152 HHCP-SERV OF OT,EA 15 MIN: HCPCS

## 2023-06-15 VITALS — DIASTOLIC BLOOD PRESSURE: 60 MMHG | SYSTOLIC BLOOD PRESSURE: 105 MMHG | OXYGEN SATURATION: 96 % | HEART RATE: 88 BPM

## 2023-06-15 NOTE — CASE COMMUNICATION
"This note is for information only  No action required  Impression  Possible  Mild  Moderate oral pharyngeal stage dysphagia  Reported esophageal stage dyspagia  Observed mostly vertical mastication, eating large bites, with chin hyperextended  No s s of aspiration observed but silent aspiration events w/ thin liquids and also NT by straw  reported in VBS 3/9/23  Parisa Noble Previous VBS/swallowing history:    3/9/23  reported Pt pr esents with moderate-severe oropharyngeal dysphagia    See details of VBS under Assessment/ swallow function  Moderate Dysarthria characterized by low volume and limited articulatory postures  Cognitive linguistic deficits  Mild  Moderate in  Immediate Memory deficits  Mild  in STM  Oriented x3  Hearing reportedly WFL  Sight is functiional       Rec  ST 2x wk x 4 wks from 6 13 23 to 7 8 23    Pt in agreement  Cont informal eval   C ont NT liquids w meals and thin water protocol between meals  Cont Dysphagia level 2 diet (puree/soft)  as tolerated   Cont aspiration precautions  Moniter temp and lung condition  Contact Dr  if change in either one  Mouth wash at least 2x per day  Moniter weight bi weekly   ex  Monday and Friday        present weight reportedly 105 lbs  and goal weight is 130 lbs  Compensatory techniques for safe swallow    eat small amounts slowly,  chew well, chin tuck when chewing and before swallow   Google where to get \"magic cups\" if desired  Repeat VBS w speech if not completed and or swallowing difficulty continues  "

## 2023-06-16 ENCOUNTER — HOME CARE VISIT (OUTPATIENT)
Dept: HOME HEALTH SERVICES | Facility: HOME HEALTHCARE | Age: 68
End: 2023-06-16
Payer: MEDICARE

## 2023-06-16 NOTE — CASE COMMUNICATION
Pt seen 1x this wk vs  2x as planned,  due to not feeling well, at [facility] request

## 2023-06-16 NOTE — CASE COMMUNICATION
Therapist arrived at home and son stated pt would like to cancel therapy for today due to fatigue from being out to lunch c family earlier in the day  Therapist recommends pt be mindful of conserving energy on therapy days to be able to participate in tx sessions

## 2023-06-16 NOTE — CASE COMMUNICATION
OTplans to continued OT services 2x per week x 3 weeks for continued review/ progression of therapeutic exercise, ADL training for review of safety with transfers/mobility , fine motor skill activity and energy conservation techniques for use with performance of daily activities

## 2023-06-17 NOTE — CASE COMMUNICATION
Text  to  daughter  to  elaine scheduled  visit  Daughter  reports   patient  is  not  feelng  well  and  declined  visit  for  today  Plan  to  re visit  Tuesday 6/20  Patient  seen  only  1  x  this  week

## 2023-06-19 ENCOUNTER — HOSPITAL ENCOUNTER (EMERGENCY)
Facility: HOSPITAL | Age: 68
Discharge: HOME | End: 2023-06-19
Attending: EMERGENCY MEDICINE
Payer: MEDICARE

## 2023-06-19 ENCOUNTER — APPOINTMENT (EMERGENCY)
Dept: RADIOLOGY | Facility: HOSPITAL | Age: 68
End: 2023-06-19
Payer: MEDICARE

## 2023-06-19 VITALS
SYSTOLIC BLOOD PRESSURE: 117 MMHG | HEART RATE: 80 BPM | DIASTOLIC BLOOD PRESSURE: 58 MMHG | OXYGEN SATURATION: 97 % | RESPIRATION RATE: 16 BRPM | TEMPERATURE: 97.5 F

## 2023-06-19 DIAGNOSIS — R10.84 GENERALIZED ABDOMINAL PAIN: Primary | ICD-10-CM

## 2023-06-19 DIAGNOSIS — R11.0 NAUSEA: ICD-10-CM

## 2023-06-19 LAB
ALBUMIN SERPL BCP-MCNC: 3 G/DL (ref 3.5–5)
ALP SERPL-CCNC: 56 U/L (ref 46–116)
ALT SERPL W P-5'-P-CCNC: 15 U/L (ref 12–78)
ANION GAP SERPL CALCULATED.3IONS-SCNC: 1 MMOL/L (ref 4–13)
AST SERPL W P-5'-P-CCNC: 20 U/L (ref 5–45)
ATRIAL RATE: 90 BPM
BASOPHILS # BLD AUTO: 0.03 THOUSANDS/ÂΜL (ref 0–0.1)
BASOPHILS NFR BLD AUTO: 1 % (ref 0–1)
BILIRUB SERPL-MCNC: 0.44 MG/DL (ref 0.2–1)
BUN SERPL-MCNC: 11 MG/DL (ref 5–25)
CALCIUM ALBUM COR SERPL-MCNC: 9.7 MG/DL (ref 8.3–10.1)
CALCIUM SERPL-MCNC: 8.9 MG/DL (ref 8.3–10.1)
CHLORIDE SERPL-SCNC: 113 MMOL/L (ref 96–108)
CO2 SERPL-SCNC: 29 MMOL/L (ref 21–32)
CREAT SERPL-MCNC: 1.03 MG/DL (ref 0.6–1.3)
EOSINOPHIL # BLD AUTO: 0 THOUSAND/ÂΜL (ref 0–0.61)
EOSINOPHIL NFR BLD AUTO: 0 % (ref 0–6)
ERYTHROCYTE [DISTWIDTH] IN BLOOD BY AUTOMATED COUNT: 14 % (ref 11.6–15.1)
GFR SERPL CREATININE-BSD FRML MDRD: 55 ML/MIN/1.73SQ M
GLUCOSE SERPL-MCNC: 72 MG/DL (ref 65–140)
HCT VFR BLD AUTO: 35.4 % (ref 34.8–46.1)
HGB BLD-MCNC: 10.8 G/DL (ref 11.5–15.4)
IMM GRANULOCYTES # BLD AUTO: 0.02 THOUSAND/UL (ref 0–0.2)
IMM GRANULOCYTES NFR BLD AUTO: 1 % (ref 0–2)
LIPASE SERPL-CCNC: 215 U/L (ref 73–393)
LYMPHOCYTES # BLD AUTO: 1.03 THOUSANDS/ÂΜL (ref 0.6–4.47)
LYMPHOCYTES NFR BLD AUTO: 24 % (ref 14–44)
MCH RBC QN AUTO: 29.3 PG (ref 26.8–34.3)
MCHC RBC AUTO-ENTMCNC: 30.5 G/DL (ref 31.4–37.4)
MCV RBC AUTO: 96 FL (ref 82–98)
MONOCYTES # BLD AUTO: 0.52 THOUSAND/ÂΜL (ref 0.17–1.22)
MONOCYTES NFR BLD AUTO: 12 % (ref 4–12)
NEUTROPHILS # BLD AUTO: 2.67 THOUSANDS/ÂΜL (ref 1.85–7.62)
NEUTS SEG NFR BLD AUTO: 62 % (ref 43–75)
NRBC BLD AUTO-RTO: 0 /100 WBCS
P AXIS: 45 DEGREES
PLATELET # BLD AUTO: 194 THOUSANDS/UL (ref 149–390)
PMV BLD AUTO: 11 FL (ref 8.9–12.7)
POTASSIUM SERPL-SCNC: 3.4 MMOL/L (ref 3.5–5.3)
PR INTERVAL: 154 MS
PROT SERPL-MCNC: 6.3 G/DL (ref 6.4–8.4)
QRS AXIS: 42 DEGREES
QRSD INTERVAL: 92 MS
QT INTERVAL: 422 MS
QTC INTERVAL: 516 MS
RBC # BLD AUTO: 3.69 MILLION/UL (ref 3.81–5.12)
SODIUM SERPL-SCNC: 143 MMOL/L (ref 135–147)
T WAVE AXIS: 83 DEGREES
VENTRICULAR RATE: 90 BPM
WBC # BLD AUTO: 4.27 THOUSAND/UL (ref 4.31–10.16)

## 2023-06-19 PROCEDURE — 36415 COLL VENOUS BLD VENIPUNCTURE: CPT

## 2023-06-19 PROCEDURE — 80053 COMPREHEN METABOLIC PANEL: CPT

## 2023-06-19 PROCEDURE — 74176 CT ABD & PELVIS W/O CONTRAST: CPT

## 2023-06-19 PROCEDURE — 93005 ELECTROCARDIOGRAM TRACING: CPT

## 2023-06-19 PROCEDURE — 93010 ELECTROCARDIOGRAM REPORT: CPT | Performed by: INTERNAL MEDICINE

## 2023-06-19 PROCEDURE — 83690 ASSAY OF LIPASE: CPT

## 2023-06-19 PROCEDURE — G1004 CDSM NDSC: HCPCS

## 2023-06-19 PROCEDURE — 85025 COMPLETE CBC W/AUTO DIFF WBC: CPT

## 2023-06-19 PROCEDURE — 99285 EMERGENCY DEPT VISIT HI MDM: CPT

## 2023-06-19 RX ORDER — ONDANSETRON 4 MG/1
4 TABLET, FILM COATED ORAL EVERY 8 HOURS PRN
Qty: 12 TABLET | Refills: 0 | Status: ON HOLD | OUTPATIENT
Start: 2023-06-19 | End: 2023-06-24

## 2023-06-19 NOTE — ED PROVIDER NOTES
History  Chief Complaint   Patient presents with   • Abdominal Pain     With Nausea x3 days  Pt is a 71 y/o F pmhx CVA x2 February 2023, HTN, CHF presenting for evaluation of abd pain  Pt reports gradual onset of symptoms x2 weeks ago, was treated for UTI at that time, had several episodes of non-bloody loose stools  3 days ago pt's symptoms reccured, c/o gradual onset RLQ pain that radiates to the back with associated nausea  No further diarrhea, no vomiting  No fever/chills CP SOB urinary complaints  Last bowel movement earlier today  Denies any other complaints           Prior to Admission Medications   Prescriptions Last Dose Informant Patient Reported? Taking?    CANNABIDIOL PO   Yes No   Sig: Medical marijuana prn pain   DULoxetine (CYMBALTA) 60 mg delayed release capsule   Yes No   Sig: Take 60 mg by mouth 2 (two) times a day   LORazepam (ATIVAN) 0 5 mg tablet  Self No No   Sig: Take 0 5 tablets (0 25 mg total) by mouth 2 (two) times a day as needed for anxiety   albuterol (PROVENTIL HFA,VENTOLIN HFA) 90 mcg/act inhaler  Self No No   Sig: Inhale 2 puffs every 6 (six) hours as needed for wheezing   apixaban (ELIQUIS) 5 mg  Self No No   Sig: Take 1 tablet (5 mg total) by mouth 2 (two) times a day   buPROPion (WELLBUTRIN) 100 mg tablet  Self Yes No   Sig: Take 100 mg by mouth 2 (two) times a day   cinacalcet (SENSIPAR) 30 mg tablet  Self No No   Sig: Take 1 tablet (30 mg total) by mouth daily with breakfast   gabapentin (NEURONTIN) 400 mg capsule   Yes No   Sig: Take 400 mg by mouth 2 (two) times a day   hydroxychloroquine (PLAQUENIL) 200 mg tablet   No No   Sig: Take 1 tablet (200 mg total) by mouth 2 (two) times a day with meals   lamoTRIgine (LaMICtal) 150 MG tablet  Self Yes No   Sig: Take 1 tablet by mouth daily at bedtime pt taking 150mg daily at bedtime    lidocaine (LIDODERM) 5 %  Self No No   Sig: Apply 1 patch topically over 12 hours daily Remove & Discard patch within 12 hours or as directed by MD Do not start before March 25, 2023  magnesium oxide (MAG-OX) 400 mg tablet  Self No No   Sig: Take 2 tablets (800 mg total) by mouth 2 (two) times a day   melatonin 3 mg  Self No No   Sig: Take 1 tablet (3 mg total) by mouth daily at bedtime   metoprolol succinate (TOPROL-XL) 25 mg 24 hr tablet   No No   Sig: Take 1 tablet (25 mg total) by mouth daily   morphine (MSIR) 15 mg tablet  Self No No   Sig: Take 0 5 tablets (7 5 mg total) by mouth 2 (two) times a day as needed for severe pain or moderate pain Max Daily Amount: 15 mg   ondansetron (ZOFRAN-ODT) 4 mg disintegrating tablet   Yes No   Sig: PLACE 1 TABLET ON TOP OF THE TONGUE AND ALLOW TO DISSOLVE EVERY 8 HOURS AS NEEDED   Patient not taking: Reported on 6/6/2023   pantoprazole (PROTONIX) 40 mg tablet  Self No No   Sig: Take 1 tablet (40 mg total) by mouth daily in the early morning Do not start before March 25, 2023     polyethylene glycol (MIRALAX) 17 g packet  Self No No   Sig: Take 17 g by mouth daily as needed (Constipation)   Patient not taking: Reported on 6/6/2023   prazosin (MINIPRESS) 1 mg capsule  Self No No   Sig: Take 1 capsule (1 mg total) by mouth daily at bedtime   senna-docusate sodium (SENOKOT S) 8 6-50 mg per tablet  Self No No   Sig: Take 1 tablet by mouth 2 (two) times a day   Patient not taking: Reported on 5/15/2023   spironolactone (ALDACTONE) 25 mg tablet   No No   Sig: Take 1 tablet (25 mg total) by mouth daily      Facility-Administered Medications: None       Past Medical History:   Diagnosis Date   • Anxiety    • Asthma    • Bipolar depression ([facility] )    • Chronic narcotic dependence (HCC)    • Chronic pain    • Depression    • Enterovirus heart infection    • SHAR (obstructive sleep apnea)    • Osteoarthritis    • Peripheral neuropathy    • Plantar fasciitis of right foot    • Senile osteoporosis    • Seronegative arthropathy of multiple sites [facility])    • Undifferentiated connective tissue disease ([facility] )        Past Surgical History: Procedure Laterality Date   • BREAST IMPLANT     • FIXATION KYPHOPLASTY LUMBAR SPINE     • HYSTERECTOMY     • INCONTINENCE SURGERY N/A    • IR STROKE ALERT  2/12/2023   • NASAL SEPTOPLASTY W/ TURBINOPLASTY N/A    • RECTAL PROLAPSE REPAIR N/A    • REPAIR RECTOCELE     • RHINOPLASTY N/A        Family History   Problem Relation Age of Onset   • No Known Problems Mother    • No Known Problems Father    • Arthritis Family    • Diabetes Family    • Stroke Family      I have reviewed and agree with the history as documented  E-Cigarette/Vaping   • E-Cigarette Use Never User      E-Cigarette/Vaping Substances   • Nicotine No    • THC No    • CBD No    • Flavoring No    • Other No    • Unknown No      Social History     Tobacco Use   • Smoking status: Former   • Smokeless tobacco: Never   Vaping Use   • Vaping Use: Never used   Substance Use Topics   • Alcohol use: Not Currently     Comment: quit   • Drug use: Not Currently     Types: Marijuana     Comment: has medical card        Review of Systems   Constitutional: Negative for chills and fever  HENT: Negative for congestion  Respiratory: Negative for cough and shortness of breath  Cardiovascular: Negative for chest pain  Gastrointestinal: Positive for abdominal pain, diarrhea and nausea  Negative for blood in stool and vomiting  Genitourinary: Negative for dysuria, frequency, hematuria and urgency  Musculoskeletal: Positive for back pain  Skin: Negative for rash  Neurological: Negative for headaches  All other systems reviewed and are negative        Physical Exam  ED Triage Vitals [06/19/23 1200]   Temperature Pulse Respirations Blood Pressure SpO2   97 5 °F (36 4 °C) 90 16 108/66 93 %      Temp Source Heart Rate Source Patient Position - Orthostatic VS BP Location FiO2 (%)   Oral Monitor Sitting Right arm --      Pain Score       6             Orthostatic Vital Signs  Vitals:    06/19/23 1200 06/19/23 1314   BP: 108/66 117/58   Pulse: 90 80 Patient Position - Orthostatic VS: Sitting Lying       Physical Exam  Vitals and nursing note reviewed  Constitutional:       General: She is awake  She is not in acute distress  Appearance: She is well-developed  She is ill-appearing (chronically )  She is not toxic-appearing  HENT:      Head: Normocephalic and atraumatic  Mouth/Throat:      Mouth: Mucous membranes are dry  Eyes:      Extraocular Movements: Extraocular movements intact  Cardiovascular:      Rate and Rhythm: Normal rate and regular rhythm  Pulmonary:      Effort: Pulmonary effort is normal  No respiratory distress  Breath sounds: Normal breath sounds  Abdominal:      Palpations: Abdomen is soft  Tenderness: There is generalized abdominal tenderness and tenderness in the right lower quadrant  There is no right CVA tenderness, left CVA tenderness, guarding or rebound  Musculoskeletal:         General: No tenderness  Cervical back: Normal range of motion  Skin:     General: Skin is warm and dry  Neurological:      Mental Status: She is alert and oriented to person, place, and time  Motor: Weakness (residual L sided paralysis, at baseline) present           ED Medications  Medications - No data to display    Diagnostic Studies  Results Reviewed     Procedure Component Value Units Date/Time    Comprehensive metabolic panel [[id number]]  (Abnormal) Collected: 06/19/23 1412    Lab Status: Final result Specimen: Blood from Arm, Right Updated: 06/19/23 1448     Sodium 143 mmol/L      Potassium 3 4 mmol/L      Chloride 113 mmol/L      CO2 29 mmol/L      ANION GAP 1 mmol/L      BUN 11 mg/dL      Creatinine 1 03 mg/dL      Glucose 72 mg/dL      Calcium 8 9 mg/dL      Corrected Calcium 9 7 mg/dL      AST 20 U/L      ALT 15 U/L      Alkaline Phosphatase 56 U/L      Total Protein 6 3 g/dL      Albumin 3 0 g/dL      Total Bilirubin 0 44 mg/dL      eGFR 55 ml/min/1 73sq m     Narrative:      National Kidney Disease Foundation guidelines for Chronic Kidney Disease (CKD):   •  Stage 1 with normal or high GFR (GFR > 90 mL/min/1 73 square meters)  •  Stage 2 Mild CKD (GFR = 60-89 mL/min/1 73 square meters)  •  Stage 3A Moderate CKD (GFR = 45-59 mL/min/1 73 square meters)  •  Stage 3B Moderate CKD (GFR = 30-44 mL/min/1 73 square meters)  •  Stage 4 Severe CKD (GFR = 15-29 mL/min/1 73 square meters)  •  Stage 5 End Stage CKD (GFR <15 mL/min/1 73 square meters)  Note: GFR calculation is accurate only with a steady state creatinine    Lipase [[id number]]  (Normal) Collected: 06/19/23 1412    Lab Status: Final result Specimen: Blood from Arm, Right Updated: 06/19/23 1448     Lipase 215 u/L     CBC and differential [[id number]]  (Abnormal) Collected: 06/19/23 1314    Lab Status: Final result Specimen: Blood from Arm, Left Updated: 06/19/23 1336     WBC 4 27 Thousand/uL      RBC 3 69 Million/uL      Hemoglobin 10 8 g/dL      Hematocrit 35 4 %      MCV 96 fL      MCH 29 3 pg      MCHC 30 5 g/dL      RDW 14 0 %      MPV 11 0 fL      Platelets 495 Thousands/uL      nRBC 0 /100 WBCs      Neutrophils Relative 62 %      Immat GRANS % 1 %      Lymphocytes Relative 24 %      Monocytes Relative 12 %      Eosinophils Relative 0 %      Basophils Relative 1 %      Neutrophils Absolute 2 67 Thousands/µL      Immature Grans Absolute 0 02 Thousand/uL      Lymphocytes Absolute 1 03 Thousands/µL      Monocytes Absolute 0 52 Thousand/µL      Eosinophils Absolute 0 00 Thousand/µL      Basophils Absolute 0 03 Thousands/µL                  CT abdomen pelvis wo contrast   Final Result by Yudi Joshi MD (06/19 6056)   No acute inflammatory stranding   No hydronephrosis   No renal or ureteric calculi               Workstation performed: [id number]               Procedures  ECG 12 Lead Documentation Only    Date/Time: 6/19/2023 1:11 PM    Performed by: Vicky Humphries DO  Authorized by: Vicky Humphries DO    Indications / Diagnosis:  Abd pain   Patient location:  ED  Previous ECG:     Previous ECG:  Compared to current    Similarity:  Changes noted  Interpretation:     Interpretation: abnormal    Rate:     ECG rate:  90    ECG rate assessment: normal    Rhythm:     Rhythm: sinus rhythm    Ectopy:     Ectopy: none    QRS:     QRS axis:  Normal    QRS intervals:  Normal  Conduction:     Conduction: normal    ST segments:     ST segments:  Non-specific (no elevations or depressions)  T waves:     T waves: non-specific      POC AAA US    Date/Time: 6/19/2023 2:16 PM    Performed by: Darell Yi DO  Authorized by: Darell Yi DO    Patient location:  ED  Performing Provider:  Resident  Other Assisting Provider: No    Procedure details:     Exam Type:  Diagnostic and educational    Indications: abdominal pain      Views Obtained:  Transverse proximal, transverse mid view, transverse distal view and sagittal (longitudinal) view    Image quality: diagnostic      Image availability:  Images available in PACS  Findings:     Abdominal Aorta Findings: normal    Interpretation:     Aortic ultrasound impression: aorta normal    POC Biliary US    Date/Time: 6/19/2023 2:16 PM    Performed by: Darell Yi DO  Authorized by: Darell Yi DO    Patient location:  ED  Performed by:  Resident  Other Assisting Provider: No    Procedure details:     Exam Type:  Diagnostic and educational    Indications: upper right quadrant abdominal pain and nausea      Assessment for:  Cholecystitis and cholelithiasis    Views obtained comment:  Unable to ID GB    Image quality: non-diagnostic      Image availability:  Not saved  US Guided Peripheral IV    Date/Time: 6/19/2023 2:17 PM    Performed by: Darell Yi DO  Authorized by: Darell Yi DO    Patient location:  ED  Performed by:  Resident  Other Assisting Provider: No    Indications:     Indications: difficulty obtaining IV access    Procedure details:     Patient evaluated for contraindications to access (i e  fistula, thrombosis, etc): Yes      Standard clean technique used for ultrasound access: Yes      Location:  Right forearm    Catheter size:  18 gauge    Number of attempts:  1    Successful placement: yes    Post-procedure details:     Post-procedure:  Dressing applied    Assessment: free fluid flow and no signs of infiltration      Post-procedure complications: none      Patient tolerance of procedure: Tolerated well, no immediate complications          ED Course               Identification of Seniors at Risk    Flowsheet Row Most Recent Value   (ISAR) Identification of Seniors at Risk    Before the illness or injury that brought you to the Emergency, did you need someone to help you on a regular basis? 1 Filed at: 06/19/2023 1158   In the last 24 hours, have you needed more help than usual? 0 Filed at: 06/19/2023 1158   Have you been hospitalized for one or more nights during the past 6 months? 1 Filed at: 06/19/2023 1158   In general, do you see well? 0 Filed at: 06/19/2023 1158   In general, do you have serious problems with your memory? 1 Filed at: 06/19/2023 1158   Do you take more than three different medications every day? 1 Filed at: 06/19/2023 1158   ISAR Score 4 Filed at: 06/19/2023 1158                    SBIRT 22yo+    Flowsheet Row Most Recent Value   Initial Alcohol Screen: US AUDIT-C     1  How often do you have a drink containing alcohol? 0 Filed at: 06/19/2023 1159   2  How many drinks containing alcohol do you have on a typical day you are drinking? 0 Filed at: 06/19/2023 1159   3a  Male UNDER 65: How often do you have five or more drinks on one occasion? 0 Filed at: 06/19/2023 1159   3b  FEMALE Any Age, or MALE 65+: How often do you have 4 or more drinks on one occassion? 0 Filed at: 06/19/2023 1159   Audit-C Score 0 Filed at: 06/19/2023 1159   PATTI: How many times in the past year have you    Used an illegal drug or used a prescription medication for non-medical reasons?  Never Filed at: 06/19/2023 1159                Medical Decision Making  Pt is a 71 y/o F presenting for evaluation of abd pain and nausea    DDx: sbo vs appendicitis vs cholecystitis vs diverticulitis vs AAA    Plan: cbc cmp lipase CT A/P US AAA and biliary monitor and reassess  Pt not currently nauseous declining zofran at this time  Decision made to do CT A/P without contrast given pt's allergy to contrast dye    Pt lab work unremarkable no acute electrolyte abnormalities or derangements in need of correcting  Hgb and WBC at baseline  Normal Lipase  (-) AAA US  Unable to ID GB on POCUS  CT showed some increased air in colon but unclear if obstruction as there are no air fluid levels or identifiable transition point  Will await final read    On reassessment pt feels better  Abd soft nontender    CT (-) for acute intrabdominal pathology    Pt tolerating po in ED, will send zofran to pharmacy to help pt tolerate liquid po at home  Pt has remained hemodynamically stable during her entire ED course and is clear for d/c  Return precautions given, pt and pt's grandson both verbalized understanding    Amount and/or Complexity of Data Reviewed  Labs: ordered  Radiology: ordered  Risk  Prescription drug management  Disposition  Final diagnoses:   Generalized abdominal pain   Nausea     Time reflects when diagnosis was documented in both MDM as applicable and the Disposition within this note     Time User Action Codes Description Comment    6/19/2023  4:01 PM Nataliya Mickie Add [R10 84] Generalized abdominal pain     6/19/2023  4:01 PM Nataliya Mickie Add [R11 0] Nausea       ED Disposition     ED Disposition   Discharge    Condition   Stable    Date/Time   Mon Jun 19, 2023  4:01 PM    Comment   Bethany Zavala discharge to home/self care                 Follow-up Information     Follow up With Specialties Details Why Contact Info    [address] Roche, DO Family Medicine Call in 2 days  [phone] [place] [phone]  [phone]            Discharge Medication List as of 6/19/2023  4:05 PM      START taking these medications    Details   ondansetron (ZOFRAN) 4 mg tablet Take 1 tablet (4 mg total) by mouth every 8 (eight) hours as needed for nausea or vomiting for up to 5 days, Starting Mon 6/19/2023, Until Sat 6/24/2023 at 2359, Normal         CONTINUE these medications which have NOT CHANGED    Details   albuterol (PROVENTIL HFA,VENTOLIN HFA) 90 mcg/act inhaler Inhale 2 puffs every 6 (six) hours as needed for wheezing, Starting Mon 4/10/2023, Normal      apixaban (ELIQUIS) 5 mg Take 1 tablet (5 mg total) by mouth 2 (two) times a day, Starting Mon 4/10/2023, Normal      buPROPion (WELLBUTRIN) 100 mg tablet Take 100 mg by mouth 2 (two) times a day, Historical Med      CANNABIDIOL PO Medical marijuana prn pain, Historical Med      cinacalcet (SENSIPAR) 30 mg tablet Take 1 tablet (30 mg total) by mouth daily with breakfast, Starting Mon 4/10/2023, Normal      DULoxetine (CYMBALTA) 60 mg delayed release capsule Take 60 mg by mouth 2 (two) times a day, Starting Mon 5/8/2023, Historical Med      gabapentin (NEURONTIN) 400 mg capsule Take 400 mg by mouth 2 (two) times a day, Starting Mon 5/8/2023, Historical Med      hydroxychloroquine (PLAQUENIL) 200 mg tablet Take 1 tablet (200 mg total) by mouth 2 (two) times a day with meals, Starting Tue 6/6/2023, Until Sun 12/3/2023, Normal      lamoTRIgine (LaMICtal) 150 MG tablet Take 1 tablet by mouth daily at bedtime pt taking 150mg daily at bedtime , Historical Med      lidocaine (LIDODERM) 5 % Apply 1 patch topically over 12 hours daily Remove & Discard patch within 12 hours or as directed by MD Do not start before March 25, 2023 , Starting Sat 3/25/2023, No Print      LORazepam (ATIVAN) 0 5 mg tablet Take 0 5 tablets (0 25 mg total) by mouth 2 (two) times a day as needed for anxiety, Starting Fri 3/24/2023, Normal      magnesium oxide (MAG-OX) 400 mg tablet Take 2 tablets (800 mg total) by mouth 2 (two) times a day, Starting Mon 4/10/2023, Normal      melatonin 3 mg Take 1 tablet (3 mg total) by mouth daily at bedtime, Starting Mon 4/10/2023, Normal      metoprolol succinate (TOPROL-XL) 25 mg 24 hr tablet Take 1 tablet (25 mg total) by mouth daily, Starting Tue 5/23/2023, Until Wed 11/13/2024, Normal      morphine (MSIR) 15 mg tablet Take 0 5 tablets (7 5 mg total) by mouth 2 (two) times a day as needed for severe pain or moderate pain Max Daily Amount: 15 mg, Starting Fri 3/24/2023, Normal      pantoprazole (PROTONIX) 40 mg tablet Take 1 tablet (40 mg total) by mouth daily in the early morning Do not start before March 25, 2023 , Starting Sat 3/25/2023, No Print      polyethylene glycol (MIRALAX) 17 g packet Take 17 g by mouth daily as needed (Constipation), Starting Tue 3/8/2022, No Print      prazosin (MINIPRESS) 1 mg capsule Take 1 capsule (1 mg total) by mouth daily at bedtime, Starting Fri 3/24/2023, No Print      senna-docusate sodium (SENOKOT S) 8 6-50 mg per tablet Take 1 tablet by mouth 2 (two) times a day, Starting Mon 4/10/2023, Normal      spironolactone (ALDACTONE) 25 mg tablet Take 1 tablet (25 mg total) by mouth daily, Starting Tue 4/18/2023, Until Wed 8/16/2023, Normal         STOP taking these medications       ondansetron (ZOFRAN-ODT) 4 mg disintegrating tablet Comments:   Reason for Stopping:             No discharge procedures on file  PDMP Review       Value Time User    PDMP Reviewed  Yes 2/22/2023  4:00 PM Sabina Nixon PA-C           ED Provider  Attending physically available and evaluated Stephanie Mares I managed the patient along with the ED Attending      Electronically Signed by         Susan Ortiz DO  06/19/23 1957

## 2023-06-19 NOTE — DISCHARGE INSTRUCTIONS
You were seen and evaluated in the ER for abdominal pain and nausea  Negative workup  Will prescribe zofran for nausea  Please follow up with your PCP within 48 hours   If symptoms worsen or persist or you cannot tolerate liquids by mouth please return to the ER for further evaluation and management

## 2023-06-19 NOTE — Clinical Note
accompanied See Murphy to the emergency department on 6/19/2023  Return date if applicable: 78/06/1798        If you have any questions or concerns, please don't hesitate to call        Dorisann Hodgkins, DO

## 2023-06-19 NOTE — ED ATTENDING ATTESTATION
6/19/2023  INicole MD, saw and evaluated the patient  I have discussed the patient with the resident/non-physician practitioner and agree with the resident's/non-physician practitioner's findings, Plan of Care, and MDM as documented in the resident's/non-physician practitioner's note, except where noted  All available labs and Radiology studies were reviewed  I was present for key portions of any procedure(s) performed by the resident/non-physician practitioner and I was immediately available to provide assistance  At this point I agree with the current assessment done in the Emergency Department  I have conducted an independent evaluation of this patient a history and physical is as follows: This is a 70 y o  old female who presents to the ED for evaluation of lower abd pain  Has had about 2w of intermittent discomfort  Placed on ABX for UTI  now 3d of lower abd pain that radiates to the back on the R  Has some nausea  VS and nursing notes reviewed  General: Appears in NAD, awake, alert, speaking normally in full sentences  Well-nourished, well-developed  Appears older than stated age  Head: Normocephalic, atraumatic  Eyes: EOMI  Vision grossly normal  No subconjunctival hemorrhages or occular discharge noted  Symmetrical lids  ENT: Atraumatic external nose and ears  No stridor  Normal phonation  No drooling  Normal swallowing  Neck: No JVD  FROM  No goiter  CV: No pallor  Normal rate  Lungs: No tachypnea  No respiratory distress  ABD: mild RLQ tenderness  MSK: Moving all extremities equally, no peripheral edema  Skin: Dry, intact  No cyanosis  Neuro: Awake, alert, GCS15  CN II-XII grossly intact  Grossly normal gait  Psychiatric/Behavioral: Appropriate mood and affect  A/P: This is a 70 y o  female who presents to the ED for evaluation of abdominal pain  Will check labs, CAT scan-year-old appendicitis  EKG, urine  IV fluids, treat symptomatically    Reevaluate disposition accordingly        ED Course         Critical Care Time  Procedures

## 2023-06-20 ENCOUNTER — HOME CARE VISIT (OUTPATIENT)
Dept: HOME HEALTH SERVICES | Facility: HOME HEALTHCARE | Age: 68
End: 2023-06-20
Payer: MEDICARE

## 2023-06-20 PROCEDURE — G0151 HHCP-SERV OF PT,EA 15 MIN: HCPCS

## 2023-06-20 PROCEDURE — G0153 HHCP-SVS OF S/L PATH,EA 15MN: HCPCS

## 2023-06-21 ENCOUNTER — HOME CARE VISIT (OUTPATIENT)
Dept: HOME HEALTH SERVICES | Facility: HOME HEALTHCARE | Age: 68
End: 2023-06-21
Payer: MEDICARE

## 2023-06-21 VITALS — OXYGEN SATURATION: 97 % | HEART RATE: 82 BPM

## 2023-06-21 VITALS — OXYGEN SATURATION: 96 % | DIASTOLIC BLOOD PRESSURE: 72 MMHG | HEART RATE: 83 BPM | SYSTOLIC BLOOD PRESSURE: 116 MMHG

## 2023-06-21 PROCEDURE — G0158 HHC OT ASSISTANT EA 15: HCPCS

## 2023-06-22 ENCOUNTER — HOME CARE VISIT (OUTPATIENT)
Dept: HOME HEALTH SERVICES | Facility: HOME HEALTHCARE | Age: 68
End: 2023-06-22
Payer: MEDICARE

## 2023-06-22 VITALS — OXYGEN SATURATION: 95 % | HEART RATE: 87 BPM | DIASTOLIC BLOOD PRESSURE: 65 MMHG | SYSTOLIC BLOOD PRESSURE: 103 MMHG

## 2023-06-22 PROCEDURE — G0158 HHC OT ASSISTANT EA 15: HCPCS

## 2023-06-22 PROCEDURE — G0151 HHCP-SERV OF PT,EA 15 MIN: HCPCS

## 2023-06-23 ENCOUNTER — TELEPHONE (OUTPATIENT)
Dept: CARDIOLOGY CLINIC | Facility: CLINIC | Age: 68
End: 2023-06-23

## 2023-06-23 VITALS — OXYGEN SATURATION: 98 % | HEART RATE: 76 BPM

## 2023-06-23 NOTE — CASE COMMUNICATION
Received text today from Daughter reporting that she was canceling ST today due to Dr goss  Pt seen 1x vs  2x this week as planned

## 2023-06-23 NOTE — TELEPHONE ENCOUNTER
Daughter Annia Hodgson called stating pt had CT chest completed in [facility] when she had pneumonia 5/23/23. A 4 cm aortic aneurysm was discovered and they recommended following up with CT surgery ASAP. Will you place referral, or can pt wait to discuss in office with you on 7/5/23?    Clarence# [phone]

## 2023-06-26 ENCOUNTER — HOME CARE VISIT (OUTPATIENT)
Dept: HOME HEALTH SERVICES | Facility: HOME HEALTHCARE | Age: 68
End: 2023-06-26
Payer: MEDICARE

## 2023-06-26 VITALS — HEART RATE: 110 BPM | SYSTOLIC BLOOD PRESSURE: 101 MMHG | OXYGEN SATURATION: 98 % | DIASTOLIC BLOOD PRESSURE: 69 MMHG

## 2023-06-26 PROCEDURE — G0158 HHC OT ASSISTANT EA 15: HCPCS

## 2023-06-27 ENCOUNTER — HOME CARE VISIT (OUTPATIENT)
Dept: HOME HEALTH SERVICES | Facility: HOME HEALTHCARE | Age: 68
End: 2023-06-27
Payer: MEDICARE

## 2023-06-27 NOTE — CASE COMMUNICATION
Received text from Pt's Daughter stating that Pt did not feel well today and was cancelling ST session for today

## 2023-06-28 ENCOUNTER — HOME CARE VISIT (OUTPATIENT)
Dept: HOME HEALTH SERVICES | Facility: HOME HEALTHCARE | Age: 68
End: 2023-06-28
Payer: MEDICARE

## 2023-06-28 PROCEDURE — G0152 HHCP-SERV OF OT,EA 15 MIN: HCPCS

## 2023-06-28 NOTE — CASE COMMUNICATION
Text  receievd  from  patient's  daughter  requesting  visit  for  today  be  canceled  secondary  to  patient  not  feeling  well    Plan  to  revisit  1pm  on  Thursday 6/29

## 2023-06-29 ENCOUNTER — HOME CARE VISIT (OUTPATIENT)
Dept: HOME HEALTH SERVICES | Facility: HOME HEALTHCARE | Age: 68
End: 2023-06-29
Payer: MEDICARE

## 2023-06-29 VITALS — DIASTOLIC BLOOD PRESSURE: 60 MMHG | HEART RATE: 68 BPM | SYSTOLIC BLOOD PRESSURE: 100 MMHG

## 2023-06-29 PROCEDURE — G0151 HHCP-SERV OF PT,EA 15 MIN: HCPCS

## 2023-06-30 ENCOUNTER — HOME CARE VISIT (OUTPATIENT)
Dept: HOME HEALTH SERVICES | Facility: HOME HEALTHCARE | Age: 68
End: 2023-06-30
Payer: MEDICARE

## 2023-06-30 ENCOUNTER — APPOINTMENT (EMERGENCY)
Dept: CT IMAGING | Facility: HOSPITAL | Age: 68
DRG: 605 | End: 2023-06-30
Payer: MEDICARE

## 2023-06-30 ENCOUNTER — HOSPITAL ENCOUNTER (EMERGENCY)
Facility: HOSPITAL | Age: 68
DRG: 605 | End: 2023-06-30
Attending: EMERGENCY MEDICINE
Payer: MEDICARE

## 2023-06-30 ENCOUNTER — HOSPITAL ENCOUNTER (INPATIENT)
Facility: HOSPITAL | Age: 68
LOS: 6 days | DRG: 605 | End: 2023-07-07
Attending: EMERGENCY MEDICINE | Admitting: SURGERY
Payer: MEDICARE

## 2023-06-30 VITALS — OXYGEN SATURATION: 98 % | HEART RATE: 76 BPM

## 2023-06-30 VITALS
DIASTOLIC BLOOD PRESSURE: 51 MMHG | RESPIRATION RATE: 20 BRPM | SYSTOLIC BLOOD PRESSURE: 93 MMHG | OXYGEN SATURATION: 94 % | HEART RATE: 98 BPM | TEMPERATURE: 98.3 F

## 2023-06-30 DIAGNOSIS — S80.12XA LEG HEMATOMA, LEFT, INITIAL ENCOUNTER: Primary | ICD-10-CM
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
## 2023-06-30 DIAGNOSIS — Z78.1 NONVIOLENT BEHAVIOR WITH RESTRAINT USE: ICD-10-CM

## 2023-06-30 DIAGNOSIS — S80.12XA HEMATOMA OF LEG, LEFT, INITIAL ENCOUNTER: ICD-10-CM

## 2023-06-30 DIAGNOSIS — T14.8XXA HEMATOMA: ICD-10-CM

## 2023-06-30 LAB
2HR DELTA HS TROPONIN: 0 NG/L
ABO GROUP BLD: NORMAL
ALBUMIN SERPL BCP-MCNC: 3.1 G/DL (ref 3.5–5)
ALP SERPL-CCNC: 51 U/L (ref 34–104)
ALT SERPL W P-5'-P-CCNC: 13 U/L (ref 7–52)
AMORPH URATE CRY URNS QL MICRO: ABNORMAL
ANION GAP SERPL CALCULATED.3IONS-SCNC: 10 MMOL/L
APTT PPP: 33 SECONDS (ref 23–37)
AST SERPL W P-5'-P-CCNC: 29 U/L (ref 13–39)
ATRIAL RATE: 129 BPM
BACTERIA UR QL AUTO: ABNORMAL /HPF
BASOPHILS # BLD MANUAL: 0 THOUSAND/UL (ref 0–0.1)
BASOPHILS NFR MAR MANUAL: 0 % (ref 0–1)
BILIRUB DIRECT SERPL-MCNC: 0.11 MG/DL (ref 0–0.2)
BILIRUB SERPL-MCNC: 0.64 MG/DL (ref 0.2–1)
BILIRUB UR QL STRIP: ABNORMAL
BLD GP AB SCN SERPL QL: NEGATIVE
BUN SERPL-MCNC: 13 MG/DL (ref 5–25)
CALCIUM SERPL-MCNC: 8.8 MG/DL (ref 8.4–10.2)
CARDIAC TROPONIN I PNL SERPL HS: 14 NG/L
CARDIAC TROPONIN I PNL SERPL HS: 14 NG/L
CHLORIDE SERPL-SCNC: 102 MMOL/L (ref 96–108)
CK SERPL-CCNC: 545 U/L (ref 26–192)
CLARITY UR: CLEAR
CO2 SERPL-SCNC: 25 MMOL/L (ref 21–32)
COLOR UR: ABNORMAL
CREAT SERPL-MCNC: 1.07 MG/DL (ref 0.6–1.3)
EOSINOPHIL # BLD MANUAL: 0 THOUSAND/UL (ref 0–0.4)
EOSINOPHIL NFR BLD MANUAL: 0 % (ref 0–6)
ERYTHROCYTE [DISTWIDTH] IN BLOOD BY AUTOMATED COUNT: 14.6 % (ref 11.6–15.1)
GFR SERPL CREATININE-BSD FRML MDRD: 53 ML/MIN/1.73SQ M
GLUCOSE SERPL-MCNC: 83 MG/DL (ref 65–140)
GLUCOSE UR STRIP-MCNC: NEGATIVE MG/DL
HCT VFR BLD AUTO: 28.9 % (ref 34.8–46.1)
HCT VFR BLD AUTO: 34.2 % (ref 34.8–46.1)
HGB BLD-MCNC: 10.7 G/DL (ref 11.5–15.4)
HGB BLD-MCNC: 9.3 G/DL (ref 11.5–15.4)
HGB UR QL STRIP.AUTO: NEGATIVE
HYALINE CASTS #/AREA URNS LPF: ABNORMAL /LPF
INR PPP: 1.65 (ref 0.84–1.19)
KETONES UR STRIP-MCNC: ABNORMAL MG/DL
LEUKOCYTE ESTERASE UR QL STRIP: NEGATIVE
LYMPHOCYTES # BLD AUTO: 0.9 THOUSAND/UL (ref 0.6–4.47)
LYMPHOCYTES # BLD AUTO: 10 % (ref 14–44)
MAGNESIUM SERPL-MCNC: 1.8 MG/DL (ref 1.9–2.7)
MCH RBC QN AUTO: 29.3 PG (ref 26.8–34.3)
MCHC RBC AUTO-ENTMCNC: 31.3 G/DL (ref 31.4–37.4)
MCV RBC AUTO: 94 FL (ref 82–98)
MONOCYTES # BLD AUTO: 0.63 THOUSAND/UL (ref 0–1.22)
MONOCYTES NFR BLD: 7 % (ref 4–12)
MUCOUS THREADS UR QL AUTO: ABNORMAL
NEUTROPHILS # BLD MANUAL: 7.44 THOUSAND/UL (ref 1.85–7.62)
NEUTS SEG NFR BLD AUTO: 83 % (ref 43–75)
NITRITE UR QL STRIP: NEGATIVE
NON-SQ EPI CELLS URNS QL MICRO: ABNORMAL /HPF
OVALOCYTES BLD QL SMEAR: PRESENT
PH UR STRIP.AUTO: 7.5 [PH] (ref 4.5–8)
PLATELET # BLD AUTO: 292 THOUSANDS/UL (ref 149–390)
PLATELET BLD QL SMEAR: ADEQUATE
PMV BLD AUTO: 11.1 FL (ref 8.9–12.7)
POTASSIUM SERPL-SCNC: 3.7 MMOL/L (ref 3.5–5.3)
PR INTERVAL: 158 MS
PROT SERPL-MCNC: 5.9 G/DL (ref 6.4–8.4)
PROT UR STRIP-MCNC: ABNORMAL MG/DL
PROTHROMBIN TIME: 19.5 SECONDS (ref 11.6–14.5)
QRS AXIS: -1 DEGREES
QRSD INTERVAL: 92 MS
QT INTERVAL: 310 MS
QTC INTERVAL: 454 MS
RBC # BLD AUTO: 3.65 MILLION/UL (ref 3.81–5.12)
RBC #/AREA URNS AUTO: ABNORMAL /HPF
RH BLD: NEGATIVE
SODIUM SERPL-SCNC: 137 MMOL/L (ref 135–147)
SP GR UR STRIP.AUTO: >=1.03 (ref 1–1.03)
SPECIMEN EXPIRATION DATE: NORMAL
T WAVE AXIS: 104 DEGREES
TSH SERPL DL<=0.05 MIU/L-ACNC: 2.17 UIU/ML (ref 0.45–4.5)
UROBILINOGEN UR QL STRIP.AUTO: 0.2 E.U./DL
VENTRICULAR RATE: 129 BPM
WBC # BLD AUTO: 8.96 THOUSAND/UL (ref 4.31–10.16)
WBC #/AREA URNS AUTO: ABNORMAL /HPF

## 2023-06-30 PROCEDURE — 93005 ELECTROCARDIOGRAM TRACING: CPT

## 2023-06-30 PROCEDURE — 99285 EMERGENCY DEPT VISIT HI MDM: CPT

## 2023-06-30 PROCEDURE — 36415 COLL VENOUS BLD VENIPUNCTURE: CPT | Performed by: EMERGENCY MEDICINE

## 2023-06-30 PROCEDURE — G1004 CDSM NDSC: HCPCS

## 2023-06-30 PROCEDURE — 96375 TX/PRO/DX INJ NEW DRUG ADDON: CPT

## 2023-06-30 PROCEDURE — 93010 ELECTROCARDIOGRAM REPORT: CPT | Performed by: STUDENT IN AN ORGANIZED HEALTH CARE EDUCATION/TRAINING PROGRAM

## 2023-06-30 PROCEDURE — 80048 BASIC METABOLIC PNL TOTAL CA: CPT | Performed by: EMERGENCY MEDICINE

## 2023-06-30 PROCEDURE — 87077 CULTURE AEROBIC IDENTIFY: CPT

## 2023-06-30 PROCEDURE — 85007 BL SMEAR W/DIFF WBC COUNT: CPT | Performed by: EMERGENCY MEDICINE

## 2023-06-30 PROCEDURE — 81001 URINALYSIS AUTO W/SCOPE: CPT

## 2023-06-30 PROCEDURE — 85027 COMPLETE CBC AUTOMATED: CPT | Performed by: EMERGENCY MEDICINE

## 2023-06-30 PROCEDURE — 99222 1ST HOSP IP/OBS MODERATE 55: CPT | Performed by: SURGERY

## 2023-06-30 PROCEDURE — 85014 HEMATOCRIT: CPT

## 2023-06-30 PROCEDURE — 86900 BLOOD TYPING SEROLOGIC ABO: CPT | Performed by: EMERGENCY MEDICINE

## 2023-06-30 PROCEDURE — 84443 ASSAY THYROID STIM HORMONE: CPT | Performed by: EMERGENCY MEDICINE

## 2023-06-30 PROCEDURE — 99285 EMERGENCY DEPT VISIT HI MDM: CPT | Performed by: EMERGENCY MEDICINE

## 2023-06-30 PROCEDURE — 96374 THER/PROPH/DIAG INJ IV PUSH: CPT

## 2023-06-30 PROCEDURE — 80076 HEPATIC FUNCTION PANEL: CPT | Performed by: EMERGENCY MEDICINE

## 2023-06-30 PROCEDURE — 87086 URINE CULTURE/COLONY COUNT: CPT

## 2023-06-30 PROCEDURE — 71260 CT THORAX DX C+: CPT

## 2023-06-30 PROCEDURE — 85610 PROTHROMBIN TIME: CPT | Performed by: EMERGENCY MEDICINE

## 2023-06-30 PROCEDURE — 86901 BLOOD TYPING SEROLOGIC RH(D): CPT | Performed by: EMERGENCY MEDICINE

## 2023-06-30 PROCEDURE — 85730 THROMBOPLASTIN TIME PARTIAL: CPT | Performed by: EMERGENCY MEDICINE

## 2023-06-30 PROCEDURE — 70450 CT HEAD/BRAIN W/O DYE: CPT

## 2023-06-30 PROCEDURE — 86850 RBC ANTIBODY SCREEN: CPT | Performed by: EMERGENCY MEDICINE

## 2023-06-30 PROCEDURE — 96361 HYDRATE IV INFUSION ADD-ON: CPT

## 2023-06-30 PROCEDURE — 74177 CT ABD & PELVIS W/CONTRAST: CPT

## 2023-06-30 PROCEDURE — 72125 CT NECK SPINE W/O DYE: CPT

## 2023-06-30 PROCEDURE — 83735 ASSAY OF MAGNESIUM: CPT | Performed by: EMERGENCY MEDICINE

## 2023-06-30 PROCEDURE — 85018 HEMOGLOBIN: CPT

## 2023-06-30 PROCEDURE — 82550 ASSAY OF CK (CPK): CPT

## 2023-06-30 PROCEDURE — 84484 ASSAY OF TROPONIN QUANT: CPT | Performed by: EMERGENCY MEDICINE

## 2023-06-30 RX ORDER — POTASSIUM CHLORIDE 20 MEQ/1
40 TABLET, EXTENDED RELEASE ORAL ONCE
Status: DISCONTINUED | OUTPATIENT
Start: 2023-06-30 | End: 2023-06-30

## 2023-06-30 RX ORDER — ALBUTEROL SULFATE 90 UG/1
2 AEROSOL, METERED RESPIRATORY (INHALATION) EVERY 6 HOURS PRN
Status: DISCONTINUED | OUTPATIENT
Start: 2023-06-30 | End: 2023-07-07 | Stop reason: HOSPADM

## 2023-06-30 RX ORDER — METOPROLOL SUCCINATE 25 MG/1
25 TABLET, EXTENDED RELEASE ORAL DAILY
Status: DISCONTINUED | OUTPATIENT
Start: 2023-07-01 | End: 2023-07-06

## 2023-06-30 RX ORDER — HYDROXYCHLOROQUINE SULFATE 200 MG/1
200 TABLET, FILM COATED ORAL 2 TIMES DAILY WITH MEALS
Status: DISCONTINUED | OUTPATIENT
Start: 2023-07-01 | End: 2023-07-07 | Stop reason: HOSPADM

## 2023-06-30 RX ORDER — CINACALCET 30 MG/1
30 TABLET, FILM COATED ORAL
Status: DISCONTINUED | OUTPATIENT
Start: 2023-07-01 | End: 2023-07-07 | Stop reason: HOSPADM

## 2023-06-30 RX ORDER — PRAZOSIN HYDROCHLORIDE 1 MG/1
1 CAPSULE ORAL
Status: DISCONTINUED | OUTPATIENT
Start: 2023-06-30 | End: 2023-07-07 | Stop reason: HOSPADM

## 2023-06-30 RX ORDER — DULOXETIN HYDROCHLORIDE 60 MG/1
60 CAPSULE, DELAYED RELEASE ORAL 2 TIMES DAILY
Status: DISCONTINUED | OUTPATIENT
Start: 2023-06-30 | End: 2023-07-07 | Stop reason: HOSPADM

## 2023-06-30 RX ORDER — DIPHENHYDRAMINE HYDROCHLORIDE 50 MG/ML
50 INJECTION INTRAMUSCULAR; INTRAVENOUS ONCE
Status: COMPLETED | OUTPATIENT
Start: 2023-06-30 | End: 2023-06-30

## 2023-06-30 RX ORDER — SODIUM CHLORIDE 9 MG/ML
150 INJECTION, SOLUTION INTRAVENOUS CONTINUOUS
Status: DISCONTINUED | OUTPATIENT
Start: 2023-06-30 | End: 2023-06-30 | Stop reason: HOSPADM

## 2023-06-30 RX ORDER — POTASSIUM CHLORIDE 20MEQ/15ML
40 LIQUID (ML) ORAL ONCE
Status: COMPLETED | OUTPATIENT
Start: 2023-06-30 | End: 2023-06-30

## 2023-06-30 RX ORDER — ACETAMINOPHEN 325 MG/1
650 TABLET ORAL EVERY 6 HOURS SCHEDULED
Status: DISCONTINUED | OUTPATIENT
Start: 2023-07-01 | End: 2023-07-07 | Stop reason: HOSPADM

## 2023-06-30 RX ORDER — DIPHENHYDRAMINE HYDROCHLORIDE 50 MG/ML
25 INJECTION INTRAMUSCULAR; INTRAVENOUS ONCE
Status: DISCONTINUED | OUTPATIENT
Start: 2023-06-30 | End: 2023-06-30

## 2023-06-30 RX ORDER — ONDANSETRON 2 MG/ML
4 INJECTION INTRAMUSCULAR; INTRAVENOUS EVERY 6 HOURS PRN
Status: DISCONTINUED | OUTPATIENT
Start: 2023-06-30 | End: 2023-07-07 | Stop reason: HOSPADM

## 2023-06-30 RX ORDER — PANTOPRAZOLE SODIUM 40 MG/1
40 TABLET, DELAYED RELEASE ORAL
Status: DISCONTINUED | OUTPATIENT
Start: 2023-07-01 | End: 2023-07-07 | Stop reason: HOSPADM

## 2023-06-30 RX ORDER — HYDROMORPHONE HCL IN WATER/PF 6 MG/30 ML
0.2 PATIENT CONTROLLED ANALGESIA SYRINGE INTRAVENOUS EVERY 4 HOURS PRN
Status: DISCONTINUED | OUTPATIENT
Start: 2023-06-30 | End: 2023-07-05

## 2023-06-30 RX ORDER — BUPROPION HYDROCHLORIDE 100 MG/1
100 TABLET ORAL 2 TIMES DAILY
Status: DISCONTINUED | OUTPATIENT
Start: 2023-06-30 | End: 2023-07-07 | Stop reason: HOSPADM

## 2023-06-30 RX ORDER — LABETALOL HYDROCHLORIDE 5 MG/ML
10 INJECTION, SOLUTION INTRAVENOUS EVERY 6 HOURS PRN
Status: DISCONTINUED | OUTPATIENT
Start: 2023-06-30 | End: 2023-07-07 | Stop reason: HOSPADM

## 2023-06-30 RX ORDER — MAGNESIUM SULFATE HEPTAHYDRATE 40 MG/ML
2 INJECTION, SOLUTION INTRAVENOUS ONCE
Status: COMPLETED | OUTPATIENT
Start: 2023-06-30 | End: 2023-06-30

## 2023-06-30 RX ORDER — SPIRONOLACTONE 25 MG/1
25 TABLET ORAL DAILY
Status: DISCONTINUED | OUTPATIENT
Start: 2023-07-01 | End: 2023-07-05

## 2023-06-30 RX ORDER — FENTANYL CITRATE 50 UG/ML
50 INJECTION, SOLUTION INTRAMUSCULAR; INTRAVENOUS ONCE
Status: COMPLETED | OUTPATIENT
Start: 2023-06-30 | End: 2023-06-30

## 2023-06-30 RX ORDER — OXYCODONE HYDROCHLORIDE 5 MG/1
5 TABLET ORAL EVERY 4 HOURS PRN
Status: DISCONTINUED | OUTPATIENT
Start: 2023-06-30 | End: 2023-07-07 | Stop reason: HOSPADM

## 2023-06-30 RX ADMIN — LAMOTRIGINE 150 MG: 100 TABLET ORAL at 23:30

## 2023-06-30 RX ADMIN — MAGNESIUM SULFATE HEPTAHYDRATE 2 G: 40 INJECTION, SOLUTION INTRAVENOUS at 21:44

## 2023-06-30 RX ADMIN — FENTANYL CITRATE 50 MCG: 50 INJECTION INTRAMUSCULAR; INTRAVENOUS at 17:19

## 2023-06-30 RX ADMIN — SODIUM CHLORIDE 1000 ML: 0.9 INJECTION, SOLUTION INTRAVENOUS at 13:48

## 2023-06-30 RX ADMIN — DULOXETINE HYDROCHLORIDE 60 MG: 60 CAPSULE, DELAYED RELEASE ORAL at 23:30

## 2023-06-30 RX ADMIN — SODIUM CHLORIDE 150 ML/HR: 0.9 INJECTION, SOLUTION INTRAVENOUS at 16:55

## 2023-06-30 RX ADMIN — HYDROMORPHONE HYDROCHLORIDE 0.2 MG: 0.2 INJECTION, SOLUTION INTRAMUSCULAR; INTRAVENOUS; SUBCUTANEOUS at 23:28

## 2023-06-30 RX ADMIN — OXYCODONE HYDROCHLORIDE 5 MG: 5 TABLET ORAL at 21:27

## 2023-06-30 RX ADMIN — DIPHENHYDRAMINE HYDROCHLORIDE 50 MG: 50 INJECTION, SOLUTION INTRAMUSCULAR; INTRAVENOUS at 13:50

## 2023-06-30 RX ADMIN — POTASSIUM CHLORIDE 40 MEQ: 1.5 SOLUTION ORAL at 21:27

## 2023-06-30 RX ADMIN — IOHEXOL 85 ML: 350 INJECTION, SOLUTION INTRAVENOUS at 14:39

## 2023-06-30 NOTE — EMTALA/ACUTE CARE TRANSFER
[facility] [address]  [address] [facility] [address]  Dept: [phone]      EMTALA TRANSFER CONSENT    NAME Mary Raphael                                         1955                              MRN [id number]    I have been informed of my rights regarding examination, treatment, and transfer   by Dr Eddie Badillo MD    Benefits: Specialized equipment and/or services available at the receiving facility (Include comment)________________________ (Trauma)    Risks: Increased discomfort during transfer, Possible worsening of condition or death during transfer      Consent for Transfer:  I acknowledge that my medical condition has been evaluated and explained to me by the emergency department physician or other qualified medical person and/or my attending physician, who has recommended that I be transferred to the service of  Accepting Physician: Dr Jaswant Jolly at [address], [address] : [facility]  The above potential benefits of such transfer, the potential risks associated with such transfer, and the probable risks of not being transferred have been explained to me, and I fully understand them  The doctor has explained that, in my case, the benefits of transfer outweigh the risks  I agree to be transferred  I authorize the performance of emergency medical procedures and treatments upon me in both transit and upon arrival at the receiving facility  Additionally, I authorize the release of any and all medical records to the receiving facility and request they be transported with me, if possible  I understand that the safest mode of transportation during a medical emergency is an ambulance and that the Hospital advocates the use of this mode of transport   Risks of traveling to the receiving facility by car, including absence of medical control, life sustaining equipment, such as oxygen, and medical personnel has been explained to me and I fully understand them  (STANLEY CORRECT BOX BELOW)  [  ]  I consent to the stated transfer and to be transported by ambulance/helicopter  [  ]  I consent to the stated transfer, but refuse transportation by ambulance and accept full responsibility for my transportation by car  I understand the risks of non-ambulance transfers and I exonerate the Hospital and its staff from any deterioration in my condition that results from this refusal     X___________________________________________    DATE  23  TIME________  Signature of patient or legally responsible individual signing on patient behalf           RELATIONSHIP TO PATIENT_________________________          Provider Certification    NAME Madeline Cee                                        [facility] 1955                              MRN [id number]    A medical screening exam was performed on the above named patient  Based on the examination:    Condition Necessitating Transfer The primary encounter diagnosis was Fall, initial encounter  A diagnosis of Hematoma was also pertinent to this visit      Patient Condition: The patient has been stabilized such that within reasonable medical probability, no material deterioration of the patient condition or the condition of the unborn child(michael) is likely to result from the transfer    Reason for Transfer: Level of Care needed not available at this facility    Transfer Requirements: [address] [place] [address]   · Space available and qualified personnel available for treatment as acknowledged by    · Agreed to accept transfer and to provide appropriate medical treatment as acknowledged by       Dr Milind Patel  · Appropriate medical records of the examination and treatment of the patient are provided at the time of transfer   [address] [facility], [address] _______  · Transfer will be performed by qualified personnel from    and appropriate transfer equipment as required, including the use of necessary and appropriate life support measures  Provider Certification: I have examined the patient and explained the following risks and benefits of being transferred/refusing transfer to the patient/family:  General risk, such as traffic hazards, adverse weather conditions, rough terrain or turbulence, possible failure of equipment (including vehicle or aircraft), or consequences of actions of persons outside the control of the transport personnel, Unanticipated needs of medical equipment and personnel during transport, Risk of worsening condition      Based on these reasonable risks and benefits to the patient and/or the unborn child(michael), and based upon the information available at the time of the patient’s examination, I certify that the medical benefits reasonably to be expected from the provision of appropriate medical treatments at another medical facility outweigh the increasing risks, if any, to the individual’s medical condition, and in the case of labor to the unborn child, from effecting the transfer      X____________________________________________ DATE 06/30/23        TIME_______      ORIGINAL - SEND TO MEDICAL RECORDS   COPY - SEND WITH PATIENT DURING TRANSFER

## 2023-06-30 NOTE — ED NOTES
Milan Arriaza unable to obtain intravascular access at this time  Sammie Wade at bedside with ultrasound to attempt        Estuardo Morales, JULISA  06/30/23 4732

## 2023-06-30 NOTE — ED NOTES
Unable to obtain intravascular access at this time  Virgie Raines, RN attempting at this time        [facility] Fransico, RN  06/30/23 4393

## 2023-06-30 NOTE — ED NOTES
P/u time 1800 with [facility] to B ED  Dr Monisha Melgar accepting   # for report: Samia Ritter RN  06/30/23 2746

## 2023-06-30 NOTE — ED PROVIDER NOTES
History  Chief Complaint   Patient presents with   • Fall     Pt states that she woke up this morning and was disoriented, thought she was going into the bathroom but opened the front door and fell out the door  Swelling and pain to left hip, pain to lower back  Doesn't believe she struck her head, but fall was not witnessed  Pt states she feels disoriented still  BP 79/53 in triage  71 YO female presents with pain in the Left hip and back after a fall this morning  Daughter assists with history  Patient lives at home, Hx of CVA in the past  She got up from bed this morning, states she was feeling disoriented at the time, she used her walker to exit the house through the front door  She thinks she must have tripped on the porch and fell, states she did land on her Left hip and was unable to get up  Daughter states the patient's son found her on the front porch  Patient has issues with ambulation at baseline, daughter states she is not to be getting out of bed in the morning without assistance  She denies any lightheadedness, chest pain, shortness of breath or palpitations prior to the fall  Patient has had some issues with confusion in the past, daughter states she may have a urinary tract infection  Patient denies striking her head during the fall, she does take Eliquis  Pt denies CP/SOB/F/C/N/V/D/C, no dysuria, burning on urination or blood in urine  History provided by:  Patient and relative   used: No        Prior to Admission Medications   Prescriptions Last Dose Informant Patient Reported? Taking?    CANNABIDIOL PO   Yes No   Sig: Medical marijuana prn pain   DULoxetine (CYMBALTA) 60 mg delayed release capsule   Yes No   Sig: Take 60 mg by mouth 2 (two) times a day   LORazepam (ATIVAN) 0 5 mg tablet  Self No No   Sig: Take 0 5 tablets (0 25 mg total) by mouth 2 (two) times a day as needed for anxiety   albuterol (PROVENTIL HFA,VENTOLIN HFA) 90 mcg/act inhaler  Self No No   Sig: Inhale 2 puffs every 6 (six) hours as needed for wheezing   apixaban (ELIQUIS) 5 mg  Self No No   Sig: Take 1 tablet (5 mg total) by mouth 2 (two) times a day   buPROPion (WELLBUTRIN) 100 mg tablet  Self Yes No   Sig: Take 100 mg by mouth 2 (two) times a day   cinacalcet (SENSIPAR) 30 mg tablet  Self No No   Sig: Take 1 tablet (30 mg total) by mouth daily with breakfast   gabapentin (NEURONTIN) 400 mg capsule   Yes No   Sig: Take 400 mg by mouth 2 (two) times a day   hydroxychloroquine (PLAQUENIL) 200 mg tablet   No No   Sig: Take 1 tablet (200 mg total) by mouth 2 (two) times a day with meals   lamoTRIgine (LaMICtal) 150 MG tablet  Self Yes No   Sig: Take 1 tablet by mouth daily at bedtime pt taking 150mg daily at bedtime    lidocaine (LIDODERM) 5 %  Self No No   Sig: Apply 1 patch topically over 12 hours daily Remove & Discard patch within 12 hours or as directed by MD Do not start before March 25, 2023  magnesium oxide (MAG-OX) 400 mg tablet  Self No No   Sig: Take 2 tablets (800 mg total) by mouth 2 (two) times a day   melatonin 3 mg  Self No No   Sig: Take 1 tablet (3 mg total) by mouth daily at bedtime   metoprolol succinate (TOPROL-XL) 25 mg 24 hr tablet   No No   Sig: Take 1 tablet (25 mg total) by mouth daily   morphine (MSIR) 15 mg tablet  Self No No   Sig: Take 0 5 tablets (7 5 mg total) by mouth 2 (two) times a day as needed for severe pain or moderate pain Max Daily Amount: 15 mg   ondansetron (ZOFRAN) 4 mg tablet   No No   Sig: Take 1 tablet (4 mg total) by mouth every 8 (eight) hours as needed for nausea or vomiting for up to 5 days   pantoprazole (PROTONIX) 40 mg tablet  Self No No   Sig: Take 1 tablet (40 mg total) by mouth daily in the early morning Do not start before March 25, 2023     polyethylene glycol (MIRALAX) 17 g packet  Self No No   Sig: Take 17 g by mouth daily as needed (Constipation)   Patient not taking: Reported on 6/6/2023   prazosin (MINIPRESS) 1 mg capsule  Self No No   Sig: Take 1 capsule (1 mg total) by mouth daily at bedtime   senna-docusate sodium (SENOKOT S) 8 6-50 mg per tablet  Self No No   Sig: Take 1 tablet by mouth 2 (two) times a day   Patient not taking: Reported on 5/15/2023   spironolactone (ALDACTONE) 25 mg tablet   No No   Sig: Take 1 tablet (25 mg total) by mouth daily      Facility-Administered Medications: None       Past Medical History:   Diagnosis Date   • Anxiety    • Asthma    • Bipolar depression ([facility] )    • Chronic narcotic dependence (HCC)    • Chronic pain    • Depression    • Enterovirus heart infection    • SHAR (obstructive sleep apnea)    • Osteoarthritis    • Peripheral neuropathy    • Plantar fasciitis of right foot    • Senile osteoporosis    • Seronegative arthropathy of multiple sites ([place] [address] )    • Undifferentiated connective tissue disease ([place] [address] )        Past Surgical History:   Procedure Laterality Date   • BREAST IMPLANT     • FIXATION KYPHOPLASTY LUMBAR SPINE     • HYSTERECTOMY     • INCONTINENCE SURGERY N/A    • IR STROKE ALERT  2/12/2023   • NASAL SEPTOPLASTY W/ TURBINOPLASTY N/A    • RECTAL PROLAPSE REPAIR N/A    • REPAIR RECTOCELE     • RHINOPLASTY N/A        Family History   Problem Relation Age of Onset   • No Known Problems Mother    • No Known Problems Father    • Arthritis Family    • Diabetes Family    • Stroke Family      I have reviewed and agree with the history as documented  E-Cigarette/Vaping   • E-Cigarette Use Never User      E-Cigarette/Vaping Substances   • Nicotine No    • THC No    • CBD No    • Flavoring No    • Other No    • Unknown No      Social History     Tobacco Use   • Smoking status: Former   • Smokeless tobacco: Never   Vaping Use   • Vaping Use: Never used   Substance Use Topics   • Alcohol use: Not Currently     Comment: quit   • Drug use: Not Currently     Types: Marijuana     Comment: has medical card       Review of Systems   Constitutional: Negative for chills, fatigue and fever  HENT: Negative for dental problem  Eyes: Negative for visual disturbance  Respiratory: Negative for shortness of breath  Cardiovascular: Negative for chest pain  Gastrointestinal: Negative for abdominal pain, diarrhea and vomiting  Genitourinary: Negative for dysuria and frequency  Musculoskeletal: Positive for arthralgias  Skin: Negative for rash  Neurological: Negative for dizziness, weakness and light-headedness  Psychiatric/Behavioral: Positive for confusion  Negative for agitation and behavioral problems  All other systems reviewed and are negative  Physical Exam  Physical Exam  Vitals and nursing note reviewed  Constitutional:       Appearance: Normal appearance  HENT:      Head: Normocephalic and atraumatic  Eyes:      Extraocular Movements: Extraocular movements intact  Conjunctiva/sclera: Conjunctivae normal    Cardiovascular:      Rate and Rhythm: Normal rate and regular rhythm  Heart sounds: Normal heart sounds  Pulmonary:      Effort: Pulmonary effort is normal    Abdominal:      General: There is no distension  Musculoskeletal:      Cervical back: Normal range of motion  Comments: Pain, swelling over the Left hip, hematoma over the hip  Skin:     Findings: No rash  Neurological:      General: No focal deficit present  Mental Status: She is alert  Cranial Nerves: No cranial nerve deficit     Psychiatric:         Mood and Affect: Mood normal          Vital Signs  ED Triage Vitals   Temperature Pulse Respirations Blood Pressure SpO2   06/30/23 1232 06/30/23 1232 06/30/23 1232 06/30/23 1232 06/30/23 1232   98 3 °F (36 8 °C) 71 18 (!) 79/53 98 %      Temp Source Heart Rate Source Patient Position - Orthostatic VS BP Location FiO2 (%)   06/30/23 1232 06/30/23 1232 06/30/23 1336 06/30/23 1336 --   Oral Monitor Lying Right arm       Pain Score       06/30/23 1232       10 - Worst Possible Pain           Vitals:    06/30/23 1515 06/30/23 1601 06/30/23 1730 06/30/23 1800   BP: 105/55 126/58 103/55 93/51   Pulse: 96 94 105 98   Patient Position - Orthostatic VS: Sitting Sitting Sitting Sitting         Visual Acuity      ED Medications  Medications   sodium chloride 0 9 % bolus 1,000 mL (0 mL Intravenous Stopped 6/30/23 1558)   diphenhydrAMINE (BENADRYL) injection 50 mg (50 mg Intravenous Given 6/30/23 1350)   iohexol (OMNIPAQUE) 350 MG/ML injection (SINGLE-DOSE) 85 mL (85 mL Intravenous Given 6/30/23 1439)   fentanyl citrate (PF) 100 MCG/2ML 50 mcg (50 mcg Intravenous Given 6/30/23 1719)       Diagnostic Studies  Results Reviewed     Procedure Component Value Units Date/Time    Urine Microscopic [[id number]]  (Abnormal) Collected: 06/30/23 1556    Lab Status: Final result Specimen: Urine, Other Updated: 06/30/23 1707     RBC, UA 4-10 /hpf      WBC, UA 10-20 /hpf      Epithelial Cells Occasional /hpf      Bacteria, UA Occasional /hpf      MUCUS THREADS Occasional     Hyaline Casts, UA 10-20 /lpf      Amorphous Crystals, UA Innumerable    Urine culture [[id number]] Collected: 06/30/23 1556    Lab Status:  In process Specimen: Urine, Other Updated: 06/30/23 1707    HS Troponin I 2hr [[id number]]  (Normal) Collected: 06/30/23 1547    Lab Status: Final result Specimen: Blood from Arm, Left Updated: 06/30/23 1618     hs TnI 2hr 14 ng/L      Delta 2hr hsTnI 0 ng/L     Manual Differential(PHLEBS Do Not Order) [[id number]]  (Abnormal) Collected: 06/30/23 1340    Lab Status: Final result Specimen: Blood from Arm, Left Updated: 06/30/23 1606     Segmented % 83 %      Lymphocytes % 10 %      Monocytes % 7 %      Eosinophils, % 0 %      Basophils % 0 %      Absolute Neutrophils 7 44 Thousand/uL      Lymphocytes Absolute 0 90 Thousand/uL      Monocytes Absolute 0 63 Thousand/uL      Eosinophils Absolute 0 00 Thousand/uL      Basophils Absolute 0 00 Thousand/uL      Total Counted --     Platelet Estimate Adequate     Ovalocytes Present    Urine Macroscopic, POC [[id number]]  (Abnormal) Collected: 06/30/23 1556    Lab Status: Final result Specimen: Urine Updated: 06/30/23 1557     Color, UA Brown     Clarity, UA Clear     pH, UA 7 5     Leukocytes, UA Negative     Nitrite, UA Negative     Protein,  (2+) mg/dl      Glucose, UA Negative mg/dl      Ketones, UA 15 (1+) mg/dl      Urobilinogen, UA 0 2 E U /dl      Bilirubin, UA Small     Occult Blood, UA Negative     Specific Gravity, UA >=1 030    Narrative:      CLINITEK RESULT    TSH [[id number]]  (Normal) Collected: 06/30/23 1340    Lab Status: Final result Specimen: Blood from Arm, Left Updated: 06/30/23 1426     TSH 3RD GENERATON 2 171 uIU/mL     HS Troponin 0hr (reflex protocol) [[id number]]  (Normal) Collected: 06/30/23 1340    Lab Status: Final result Specimen: Blood from Arm, Left Updated: 06/30/23 1420     hs TnI 0hr 14 ng/L     Basic metabolic panel [[id number]] Collected: 06/30/23 1340    Lab Status: Final result Specimen: Blood from Arm, Left Updated: 06/30/23 1415     Sodium 137 mmol/L      Potassium 3 7 mmol/L      Chloride 102 mmol/L      CO2 25 mmol/L      ANION GAP 10 mmol/L      BUN 13 mg/dL      Creatinine 1 07 mg/dL      Glucose 83 mg/dL      Calcium 8 8 mg/dL      eGFR 53 ml/min/1 73sq m     Narrative:      [facility] guidelines for Chronic Kidney Disease (CKD):   •  Stage 1 with normal or high GFR (GFR > 90 mL/min/1 73 square meters)  •  Stage 2 Mild CKD (GFR = 60-89 mL/min/1 73 square meters)  •  Stage 3A Moderate CKD (GFR = 45-59 mL/min/1 73 square meters)  •  Stage 3B Moderate CKD (GFR = 30-44 mL/min/1 73 square meters)  •  Stage 4 Severe CKD (GFR = 15-29 mL/min/1 73 square meters)  •  Stage 5 End Stage CKD (GFR <15 mL/min/1 73 square meters)  Note: GFR calculation is accurate only with a steady state creatinine    Hepatic function panel [[id number]]  (Abnormal) Collected: 06/30/23 1340    Lab Status: Final result Specimen: Blood from Arm, Left Updated: 06/30/23 1415     Total Bilirubin 0 64 mg/dL      Bilirubin, Direct 0 11 mg/dL Alkaline Phosphatase 51 U/L      AST 29 U/L      ALT 13 U/L      Total Protein 5 9 g/dL      Albumin 3 1 g/dL     Magnesium [[id number]]  (Abnormal) Collected: 06/30/23 1340    Lab Status: Final result Specimen: Blood from Arm, Left Updated: 06/30/23 1415     Magnesium 1 8 mg/dL     Protime-INR [[id number]]  (Abnormal) Collected: 06/30/23 1340    Lab Status: Final result Specimen: Blood from Arm, Left Updated: 06/30/23 1405     Protime 19 5 seconds      INR 1 65    APTT [[id number]]  (Normal) Collected: 06/30/23 1340    Lab Status: Final result Specimen: Blood from Arm, Left Updated: 06/30/23 1405     PTT 33 seconds     CBC and differential [[id number]]  (Abnormal) Collected: 06/30/23 1340    Lab Status: Final result Specimen: Blood from Arm, Left Updated: 06/30/23 1358     WBC 8 96 Thousand/uL      RBC 3 65 Million/uL      Hemoglobin 10 7 g/dL      Hematocrit 34 2 %      MCV 94 fL      MCH 29 3 pg      MCHC 31 3 g/dL      RDW 14 6 %      MPV 11 1 fL      Platelets 153 Thousands/uL                  CT head without contrast   Final Result by WILDA Moss MD (06/30 1540)      No acute intracranial pathology  Stable chronic right MCA infarct  Workstation performed: [id number]         CT cervical spine without contrast   Final Result by WILDA Moss MD (06/30 1539)      No cervical spine fracture or traumatic malalignment  Workstation performed: [id number]         CT chest abdomen pelvis w contrast   Final Result by WILDA Moss MD (06/30 1539)      Large hematoma in the lateral left thigh measuring up to 9 6 cm with focus of active contrast extravasation  No acute intrathoracic or intra-abdominal injury        I personally discussed this study with Enrike Amin on 6/30/2023 3:37 PM             Workstation performed: [id number]                    Procedures  ECG 12 Lead Documentation Only    Date/Time: 6/30/2023 1:08 PM    Performed by: Rosana Drake Nathen Black MD  Authorized by: Abril More MD    ECG reviewed by me, the ED Provider: yes    Patient location:  ED  Previous ECG:     Previous ECG:  Compared to current    Comparison ECG info:  6/19/2023    Similarity:  Changes noted  Interpretation:     Interpretation: abnormal    Rate:     ECG rate:  129    ECG rate assessment: tachycardic    Rhythm:     Rhythm: sinus rhythm and sinus tachycardia    QRS:     QRS axis:  Normal    QRS intervals:  Normal  Conduction:     Conduction: normal    ST segments:     ST segments:  Non-specific  T waves:     T waves: normal               ED Course  ED Course as of 07/01/23 0853   Fri Jun 30, 2023   1310 Blood Pressure(!): 79/53  Patient and daughter note patient has history of low blood pressures while taking metoprolol, she took this just prior to arrival  Patient currently denies any lightheadedness, will give IV hydration  1401 Hemoglobin(!): 10 7  10 8 eleven days ago  1419 Creatinine: 1 07  1 07 eleven days ago  Medical Decision Making  1  Fall - Patient denies any preceding symptoms, she is tachycardic and has a significant hematoma over the Left hip  Will order  CT head, C-spine, C/A/P to assess possible significant injury  Will order ECG and troponin to rule out acute MI, CBC for anemia, leukocytosis, metabolic panel for electrolyte abnormalities and dehydration,  LFT's to assess GB dysfunction, lipase for pancreatitis  Will check type and screen  Fall, initial encounter: acute illness or injury  Hematoma: acute illness or injury  Amount and/or Complexity of Data Reviewed  External Data Reviewed: labs  Labs: ordered  Decision-making details documented in ED Course  Radiology: ordered  Discussion of management or test interpretation with external provider(s): Discussed with radiology regarding findings on CT  Discussed with trauma service at [place] [address] regarding need for transfer      Risk  Prescription drug management  Decision regarding hospitalization  Disposition  Final diagnoses:   Fall, initial encounter   Hematoma     Time reflects when diagnosis was documented in both MDM as applicable and the Disposition within this note     Time User Action Codes Description Comment    6/30/2023  3:48 PM Veronica Paulson [[id number]  [id number]] Fall, initial encounter     6/30/2023  3:48 PM Roxana [address] [place]  [id number]] Hematoma       ED Disposition     ED Disposition   Transfer to Another Facility-In Network    Condition   --    Date/Time   Fri Jun 30, 2023  3:48 PM    Comment   Devyn Campuzano should be transferred out to [facility]             MD Documentation    Clearnce Tolono Most Recent Value   Patient Condition The patient has been stabilized such that within reasonable medical probability, no material deterioration of the patient condition or the condition of the unborn child(michael) is likely to result from the transfer   Reason for Transfer Level of Care needed not available at this facility   Benefits of Transfer Specialized equipment and/or services available at the receiving facility (Include comment)________________________  [Trauma]   Risks of Transfer Increased discomfort during transfer, Possible worsening of condition or death during transfer   Accepting Physician Dr Elmer Gentile Name, Viv Fontenot   Sending MD [facility]   Provider Certification General risk, such as traffic hazards, adverse weather conditions, rough terrain or turbulence, possible failure of equipment (including vehicle or aircraft), or consequences of actions of persons outside the control of the transport personnel, Unanticipated needs of medical equipment and personnel during transport, Risk of worsening condition      RN Documentation    Alex Bailey Name, Viv Fontenot      Follow-up Information    None         Discharge Medication List as of 6/30/2023  6:56 PM      CONTINUE these medications which have NOT CHANGED    Details   albuterol (PROVENTIL HFA,VENTOLIN HFA) 90 mcg/act inhaler Inhale 2 puffs every 6 (six) hours as needed for wheezing, Starting Mon 4/10/2023, Normal      apixaban (ELIQUIS) 5 mg Take 1 tablet (5 mg total) by mouth 2 (two) times a day, Starting Mon 4/10/2023, Normal      buPROPion (WELLBUTRIN) 100 mg tablet Take 100 mg by mouth 2 (two) times a day, Historical Med      CANNABIDIOL PO Medical marijuana prn pain, Historical Med      cinacalcet (SENSIPAR) 30 mg tablet Take 1 tablet (30 mg total) by mouth daily with breakfast, Starting Mon 4/10/2023, Normal      DULoxetine (CYMBALTA) 60 mg delayed release capsule Take 60 mg by mouth 2 (two) times a day, Starting Mon 5/8/2023, Historical Med      gabapentin (NEURONTIN) 400 mg capsule Take 400 mg by mouth 2 (two) times a day, Starting Mon 5/8/2023, Historical Med      hydroxychloroquine (PLAQUENIL) 200 mg tablet Take 1 tablet (200 mg total) by mouth 2 (two) times a day with meals, Starting Tue 6/6/2023, Until Sun 12/3/2023, Normal      lamoTRIgine (LaMICtal) 150 MG tablet Take 1 tablet by mouth daily at bedtime pt taking 150mg daily at bedtime , Historical Med      lidocaine (LIDODERM) 5 % Apply 1 patch topically over 12 hours daily Remove & Discard patch within 12 hours or as directed by MD Do not start before March 25, 2023 , Starting Sat 3/25/2023, No Print      LORazepam (ATIVAN) 0 5 mg tablet Take 0 5 tablets (0 25 mg total) by mouth 2 (two) times a day as needed for anxiety, Starting Fri 3/24/2023, Normal      magnesium oxide (MAG-OX) 400 mg tablet Take 2 tablets (800 mg total) by mouth 2 (two) times a day, Starting Mon 4/10/2023, Normal      melatonin 3 mg Take 1 tablet (3 mg total) by mouth daily at bedtime, Starting Mon 4/10/2023, Normal      metoprolol succinate (TOPROL-XL) 25 mg 24 hr tablet Take 1 tablet (25 mg total) by mouth daily, Starting Tue 5/23/2023, Until Wed 11/13/2024, Normal      morphine (MSIR) 15 mg tablet Take 0 5 tablets (7 5 mg total) by mouth 2 (two) times a day as needed for severe pain or moderate pain Max Daily Amount: 15 mg, Starting Fri 3/24/2023, Normal      ondansetron (ZOFRAN) 4 mg tablet Take 1 tablet (4 mg total) by mouth every 8 (eight) hours as needed for nausea or vomiting for up to 5 days, Starting Mon 6/19/2023, Until Sat 6/24/2023 at 2359, Normal      pantoprazole (PROTONIX) 40 mg tablet Take 1 tablet (40 mg total) by mouth daily in the early morning Do not start before March 25, 2023 , Starting Sat 3/25/2023, No Print      polyethylene glycol (MIRALAX) 17 g packet Take 17 g by mouth daily as needed (Constipation), Starting Tue 3/8/2022, No Print      prazosin (MINIPRESS) 1 mg capsule Take 1 capsule (1 mg total) by mouth daily at bedtime, Starting Fri 3/24/2023, No Print      senna-docusate sodium (SENOKOT S) 8 6-50 mg per tablet Take 1 tablet by mouth 2 (two) times a day, Starting Mon 4/10/2023, Normal      spironolactone (ALDACTONE) 25 mg tablet Take 1 tablet (25 mg total) by mouth daily, Starting Tue 4/18/2023, Until Wed 8/16/2023, Normal             No discharge procedures on file      PDMP Review       Value Time User    PDMP Reviewed  Yes 2/22/2023  4:00 PM Natalia Nair PA-C          ED Provider  Electronically Signed by           Tez Myers MD  07/01/23 3431

## 2023-06-30 NOTE — CASE COMMUNICATION
[de-identified]    Pts daughter [place] Call texted me this am and reported that she was cancelling ST today due to taking PT to Urgent care to assess bruises from fall last night  Note  Pt not seen 2x this week for st due to Pt not feeling well and apt w Urgent care due to fall  Phone call to PCP   Megan Amado,DO to report the above    left message

## 2023-06-30 NOTE — CASE COMMUNICATION
OT discharge following today's visit as patient has partially met OT goals and achieved optimal therapeutic benefit for [place] [address] OT services at this time

## 2023-07-01 PROBLEM — S80.12XA LEG HEMATOMA, LEFT, INITIAL ENCOUNTER: Status: ACTIVE | Noted: 2023-07-01

## 2023-07-01 LAB
2HR DELTA HS TROPONIN: -2 NG/L
ANION GAP SERPL CALCULATED.3IONS-SCNC: 7 MMOL/L
ATRIAL RATE: 102 BPM
BUN SERPL-MCNC: 12 MG/DL (ref 5–25)
CALCIUM SERPL-MCNC: 8.6 MG/DL (ref 8.3–10.1)
CARDIAC TROPONIN I PNL SERPL HS: 14 NG/L
CARDIAC TROPONIN I PNL SERPL HS: 16 NG/L
CHLORIDE SERPL-SCNC: 110 MMOL/L (ref 96–108)
CO2 SERPL-SCNC: 25 MMOL/L (ref 21–32)
CREAT SERPL-MCNC: 1.18 MG/DL (ref 0.6–1.3)
ERYTHROCYTE [DISTWIDTH] IN BLOOD BY AUTOMATED COUNT: 15.1 % (ref 11.6–15.1)
GFR SERPL CREATININE-BSD FRML MDRD: 47 ML/MIN/1.73SQ M
GLUCOSE SERPL-MCNC: 74 MG/DL (ref 65–140)
GLUCOSE SERPL-MCNC: 82 MG/DL (ref 65–140)
HCT VFR BLD AUTO: 24.6 % (ref 34.8–46.1)
HCT VFR BLD AUTO: 25.3 % (ref 34.8–46.1)
HCT VFR BLD AUTO: 27.4 % (ref 34.8–46.1)
HGB BLD-MCNC: 7.6 G/DL (ref 11.5–15.4)
HGB BLD-MCNC: 8.2 G/DL (ref 11.5–15.4)
HGB BLD-MCNC: 8.6 G/DL (ref 11.5–15.4)
INR PPP: 1.33 (ref 0.84–1.19)
MAGNESIUM SERPL-MCNC: 2.9 MG/DL (ref 1.6–2.6)
MCH RBC QN AUTO: 29.5 PG (ref 26.8–34.3)
MCHC RBC AUTO-ENTMCNC: 31.4 G/DL (ref 31.4–37.4)
MCV RBC AUTO: 94 FL (ref 82–98)
P AXIS: 51 DEGREES
PLATELET # BLD AUTO: 255 THOUSANDS/UL (ref 149–390)
PMV BLD AUTO: 10.8 FL (ref 8.9–12.7)
POTASSIUM SERPL-SCNC: 4.3 MMOL/L (ref 3.5–5.3)
PR INTERVAL: 202 MS
PROTHROMBIN TIME: 16.7 SECONDS (ref 11.6–14.5)
QRS AXIS: 16 DEGREES
QRSD INTERVAL: 96 MS
QT INTERVAL: 380 MS
QTC INTERVAL: 495 MS
RBC # BLD AUTO: 2.92 MILLION/UL (ref 3.81–5.12)
SODIUM SERPL-SCNC: 142 MMOL/L (ref 135–147)
T WAVE AXIS: 101 DEGREES
VENTRICULAR RATE: 102 BPM
WBC # BLD AUTO: 7.57 THOUSAND/UL (ref 4.31–10.16)

## 2023-07-01 PROCEDURE — 82948 REAGENT STRIP/BLOOD GLUCOSE: CPT

## 2023-07-01 PROCEDURE — 85610 PROTHROMBIN TIME: CPT

## 2023-07-01 PROCEDURE — 93005 ELECTROCARDIOGRAM TRACING: CPT

## 2023-07-01 PROCEDURE — 80048 BASIC METABOLIC PNL TOTAL CA: CPT

## 2023-07-01 PROCEDURE — 85027 COMPLETE CBC AUTOMATED: CPT

## 2023-07-01 PROCEDURE — 99232 SBSQ HOSP IP/OBS MODERATE 35: CPT | Performed by: SURGERY

## 2023-07-01 PROCEDURE — 83735 ASSAY OF MAGNESIUM: CPT

## 2023-07-01 PROCEDURE — 85014 HEMATOCRIT: CPT

## 2023-07-01 PROCEDURE — 85018 HEMOGLOBIN: CPT

## 2023-07-01 PROCEDURE — 92610 EVALUATE SWALLOWING FUNCTION: CPT | Performed by: SPEECH-LANGUAGE PATHOLOGIST

## 2023-07-01 PROCEDURE — 84484 ASSAY OF TROPONIN QUANT: CPT

## 2023-07-01 PROCEDURE — 93010 ELECTROCARDIOGRAM REPORT: CPT | Performed by: STUDENT IN AN ORGANIZED HEALTH CARE EDUCATION/TRAINING PROGRAM

## 2023-07-01 RX ORDER — SODIUM CHLORIDE, SODIUM LACTATE, POTASSIUM CHLORIDE, CALCIUM CHLORIDE 600; 310; 30; 20 MG/100ML; MG/100ML; MG/100ML; MG/100ML
75 INJECTION, SOLUTION INTRAVENOUS CONTINUOUS
Status: DISCONTINUED | OUTPATIENT
Start: 2023-07-01 | End: 2023-07-01

## 2023-07-01 RX ORDER — SENNOSIDES 8.6 MG
1 TABLET ORAL
Status: DISCONTINUED | OUTPATIENT
Start: 2023-07-01 | End: 2023-07-07 | Stop reason: HOSPADM

## 2023-07-01 RX ORDER — METOPROLOL TARTRATE 5 MG/5ML
5 INJECTION INTRAVENOUS ONCE
Status: DISCONTINUED | OUTPATIENT
Start: 2023-07-01 | End: 2023-07-02

## 2023-07-01 RX ADMIN — SPIRONOLACTONE 25 MG: 25 TABLET ORAL at 09:33

## 2023-07-01 RX ADMIN — LAMOTRIGINE 150 MG: 100 TABLET ORAL at 22:53

## 2023-07-01 RX ADMIN — METOPROLOL SUCCINATE 25 MG: 25 TABLET, EXTENDED RELEASE ORAL at 09:32

## 2023-07-01 RX ADMIN — HYDROXYCHLOROQUINE SULFATE 200 MG: 200 TABLET ORAL at 17:34

## 2023-07-01 RX ADMIN — Medication 2.5 MG: at 20:30

## 2023-07-01 RX ADMIN — CINACALCET 30 MG: 30 TABLET, FILM COATED ORAL at 09:36

## 2023-07-01 RX ADMIN — BUPROPION HYDROCHLORIDE 100 MG: 100 TABLET, FILM COATED ORAL at 17:35

## 2023-07-01 RX ADMIN — BUPROPION HYDROCHLORIDE 100 MG: 100 TABLET, FILM COATED ORAL at 09:43

## 2023-07-01 RX ADMIN — ACETAMINOPHEN 650 MG: 325 TABLET, FILM COATED ORAL at 09:32

## 2023-07-01 RX ADMIN — ACETAMINOPHEN 650 MG: 325 TABLET, FILM COATED ORAL at 20:27

## 2023-07-01 RX ADMIN — OXYCODONE HYDROCHLORIDE 5 MG: 5 TABLET ORAL at 09:33

## 2023-07-01 RX ADMIN — BUPROPION HYDROCHLORIDE 100 MG: 100 TABLET, FILM COATED ORAL at 00:46

## 2023-07-01 RX ADMIN — SODIUM CHLORIDE, SODIUM LACTATE, POTASSIUM CHLORIDE, AND CALCIUM CHLORIDE 75 ML/HR: .6; .31; .03; .02 INJECTION, SOLUTION INTRAVENOUS at 11:29

## 2023-07-01 RX ADMIN — HYDROXYCHLOROQUINE SULFATE 200 MG: 200 TABLET ORAL at 09:32

## 2023-07-01 RX ADMIN — SENNOSIDES 8.6 MG: 8.6 TABLET, FILM COATED ORAL at 22:53

## 2023-07-01 RX ADMIN — SODIUM CHLORIDE, SODIUM LACTATE, POTASSIUM CHLORIDE, AND CALCIUM CHLORIDE 500 ML: .6; .31; .03; .02 INJECTION, SOLUTION INTRAVENOUS at 03:29

## 2023-07-01 RX ADMIN — PANTOPRAZOLE SODIUM 40 MG: 40 TABLET, DELAYED RELEASE ORAL at 06:24

## 2023-07-01 RX ADMIN — DULOXETINE HYDROCHLORIDE 60 MG: 60 CAPSULE, DELAYED RELEASE ORAL at 17:35

## 2023-07-01 RX ADMIN — ACETAMINOPHEN 650 MG: 325 TABLET, FILM COATED ORAL at 02:09

## 2023-07-01 RX ADMIN — DULOXETINE HYDROCHLORIDE 60 MG: 60 CAPSULE, DELAYED RELEASE ORAL at 09:32

## 2023-07-01 RX ADMIN — ACETAMINOPHEN 650 MG: 325 TABLET, FILM COATED ORAL at 13:23

## 2023-07-01 NOTE — PLAN OF CARE
Problem: MOBILITY - ADULT  Goal: Maintain or return to baseline ADL function  Description: INTERVENTIONS:  -  Assess patient's ability to carry out ADLs; assess patient's baseline for ADL function and identify physical deficits which impact ability to perform ADLs (bathing, care of mouth/teeth, toileting, grooming, dressing, etc.)  - Assess/evaluate cause of self-care deficits   - Assess range of motion  - Assess patient's mobility; develop plan if impaired  - Assess patient's need for assistive devices and provide as appropriate  - Encourage maximum independence but intervene and supervise when necessary  - Involve family in performance of ADLs  - Assess for home care needs following discharge   - Consider OT consult to assist with ADL evaluation and planning for discharge  - Provide patient education as appropriate  Outcome: Progressing  Goal: Maintains/Returns to pre admission functional level  Description: INTERVENTIONS:  - Perform BMAT or MOVE assessment daily.   - Set and communicate daily mobility goal to care team and patient/family/caregiver. - Collaborate with rehabilitation services on mobility goals if consulted  - Perform Range of Motion 3 times a day. - Reposition patient every 2 hours.   - Dangle patient 3 times a day- Out of bed for toileting  - Record patient progress and toleration of activity level   Outcome: Progressing     Problem: PAIN - ADULT  Goal: Verbalizes/displays adequate comfort level or baseline comfort level  Description: Interventions:  - Encourage patient to monitor pain and request assistance  - Assess pain using appropriate pain scale  - Administer analgesics based on type and severity of pain and evaluate response  - Implement non-pharmacological measures as appropriate and evaluate response  - Consider cultural and social influences on pain and pain management  - Notify physician/advanced practitioner if interventions unsuccessful or patient reports new pain  Outcome: Progressing     Problem: INFECTION - ADULT  Goal: Absence or prevention of progression during hospitalization  Description: INTERVENTIONS:  - Assess and monitor for signs and symptoms of infection  - Monitor lab/diagnostic results  - Monitor all insertion sites, i.e. indwelling lines, tubes, and drains  - Monitor endotracheal if appropriate and nasal secretions for changes in amount and color  - Hart appropriate cooling/warming therapies per order  - Administer medications as ordered  - Instruct and encourage patient and family to use good hand hygiene technique  - Identify and instruct in appropriate isolation precautions for identified infection/condition  Outcome: Progressing  Goal: Absence of fever/infection during neutropenic period  Description: INTERVENTIONS:  - Monitor WBC    Outcome: Progressing     Problem: SAFETY ADULT  Goal: Maintain or return to baseline ADL function  Description: INTERVENTIONS:  -  Assess patient's ability to carry out ADLs; assess patient's baseline for ADL function and identify physical deficits which impact ability to perform ADLs (bathing, care of mouth/teeth, toileting, grooming, dressing, etc.)  - Assess/evaluate cause of self-care deficits   - Assess range of motion  - Assess patient's mobility; develop plan if impaired  - Assess patient's need for assistive devices and provide as appropriate  - Encourage maximum independence but intervene and supervise when necessary  - Involve family in performance of ADLs  - Assess for home care needs following discharge   - Consider OT consult to assist with ADL evaluation and planning for discharge  - Provide patient education as appropriate  Outcome: Progressing  Goal: Maintains/Returns to pre admission functional level  Description: INTERVENTIONS:  - Perform BMAT or MOVE assessment daily.   - Set and communicate daily mobility goal to care team and patient/family/caregiver.    - Collaborate with rehabilitation services on mobility goals if consulted  - Perform Range of Motion 3 times a day. - Reposition patient every 2 hours. - Dangle patient 3 times a day- Out of bed for toileting  - Record patient progress and toleration of activity level   Outcome: Progressing  Goal: Patient will remain free of falls  Description: INTERVENTIONS:  - Educate patient/family on patient safety including physical limitations  - Instruct patient to call for assistance with activity   - Consult OT/PT to assist with strengthening/mobility   - Keep Call bell within reach  - Keep bed low and locked with side rails adjusted as appropriate  - Keep care items and personal belongings within reach  - Initiate and maintain comfort rounds  - Make Fall Risk Sign visible to staff  - Offer Toileting every 2 Hours, in advance of need  - Initiate/Maintain bed alarm  - Obtain necessary fall risk management equipment:   - Apply yellow socks and bracelet for high fall risk patients  - Consider moving patient to room near nurses station  Outcome: Progressing     Problem: DISCHARGE PLANNING  Goal: Discharge to home or other facility with appropriate resources  Description: INTERVENTIONS:  - Identify barriers to discharge w/patient and caregiver  - Arrange for needed discharge resources and transportation as appropriate  - Identify discharge learning needs (meds, wound care, etc.)  - Arrange for interpretive services to assist at discharge as needed  - Refer to Case Management Department for coordinating discharge planning if the patient needs post-hospital services based on physician/advanced practitioner order or complex needs related to functional status, cognitive ability, or social support system  Outcome: Progressing     Problem: Knowledge Deficit  Goal: Patient/family/caregiver demonstrates understanding of disease process, treatment plan, medications, and discharge instructions  Description: Complete learning assessment and assess knowledge base.   Interventions:  - Provide teaching at level of understanding  - Provide teaching via preferred learning methods  Outcome: Progressing     Problem: Nutrition/Hydration-ADULT  Goal: Nutrient/Hydration intake appropriate for improving, restoring or maintaining nutritional needs  Description: Monitor and assess patient's nutrition/hydration status for malnutrition. Collaborate with interdisciplinary team and initiate plan and interventions as ordered. Monitor patient's weight and dietary intake as ordered or per policy. Utilize nutrition screening tool and intervene as necessary. Determine patient's food preferences and provide high-protein, high-caloric foods as appropriate.      INTERVENTIONS:  - Monitor oral intake, urinary output, labs, and treatment plans  - Assess nutrition and hydration status and recommend course of action  - Evaluate amount of meals eaten  - Assist patient with eating if necessary   - Allow adequate time for meals  - Recommend/ encourage appropriate diets, oral nutritional supplements, and vitamin/mineral supplements  - Order, calculate, and assess calorie counts as needed  - Recommend, monitor, and adjust tube feedings and TPN/PPN based on assessed needs  - Assess need for intravenous fluids  - Provide specific nutrition/hydration education as appropriate  - Include patient/family/caregiver in decisions related to nutrition  Outcome: Progressing

## 2023-07-01 NOTE — SPEECH THERAPY NOTE
Speech-Language Pathology Bedside Swallow Evaluation        Patient Name: Rolando Greene    UJGIF'P Date: 7/1/2023     Problem List  Patient Active Problem List   Diagnosis   • Chronic pain syndrome   • Mild intermittent asthma without complication   • Ambulatory dysfunction   • Seronegative arthropathy of multiple sites ([address])   • Gastroesophageal reflux disease without esophagitis   • Mixed stress and urge urinary incontinence   • CHF (congestive heart failure) ([facility])   • Anemia   • Undifferentiated connective tissue disease ([address])   • Primary generalized (osteo)arthritis   • Peripheral neuropathy   • Osteoporosis   • Hx of compression fracture of spine   • Lumbar spondylosis   • Cervical spondylosis   • Closed fracture of multiple ribs of right side   • Traumatic pneumothorax   • Closed fracture of transverse process of lumbar vertebra [facility])   • Liver contusion   • Fall   • Acute pain due to trauma   • Right shoulder pain   • Severe protein-calorie malnutrition ([facility])   • Bipolar depression ([facility])   • Rheumatoid arthritis of multiple sites with negative rheumatoid factor ([facility])   • 'light-for-dates' infant with signs of fetal malnutrition   • LAILA (acute kidney injury) ([address])   • Polypharmacy   • Cardiomyopathy ([address])   • Vitamin D insufficiency   • Stroke ([address])   • HTN (hypertension)   • HFrEF (heart failure with reduced ejection fraction) ([facility])   • Hypercalcemia   • Discoloration of skin of foot   • Abdominal pain   • Acute CVA (cerebrovascular accident) ([address])   • History of bacteremia   • Obstructive sleep apnea   • History of stroke   • AMS (altered mental status)   • Gout of foot   • Dysphagia   • Wound of left ankle   • Hypokalemia   • Sinus tachycardia   • Hypomagnesemia   • Insomnia   • Nocturnal hypoxia   • Stroke-like symptoms   • Cerebral aneurysm, nonruptured   • Leg hematoma, left, initial encounter       Past Medical History  Past Medical History:   Diagnosis Date   • Anxiety    • Asthma    • Bipolar depression ([address])    • Chronic narcotic dependence (HCC)    • Chronic pain    • Depression    • Enterovirus heart infection    • SHAR (obstructive sleep apnea)    • Osteoarthritis    • Peripheral neuropathy    • Plantar fasciitis of right foot    • Senile osteoporosis    • Seronegative arthropathy of multiple sites [facility])    • Undifferentiated connective tissue disease ([address])        Past Surgical History  Past Surgical History:   Procedure Laterality Date   • BREAST IMPLANT     • FIXATION KYPHOPLASTY LUMBAR SPINE     • HYSTERECTOMY     • INCONTINENCE SURGERY N/A    • IR STROKE ALERT  2/12/2023   • NASAL SEPTOPLASTY W/ TURBINOPLASTY N/A    • RECTAL PROLAPSE REPAIR N/A    • REPAIR RECTOCELE     • RHINOPLASTY N/A          Current Medical Status  Pt is a 70 y.o. female who presented to [facility] as a transfer from [facility] s/p fall on front porch landing on L hip. She was found to have left leg/left hip hematoma with evidence of active extravasation on CT imaging. SLP consult requested d/t h/o dysphagia. She is known to this department from prior admissions (H/o CVA). She participated in VBS in March and noted to have mod-sev oropharyngeal dysphagia with silent aspiration with thins as well as nectar thick by straw. Recommendations were for puree and nectar thick by cup at that time. She has since worked with a [facility] SLP and recently started trials of some soft foods. Reports good compliance with diet at home. Past medical history:   Please see H&P for details    Special Studies:  6/30 CT Chest/ab/ pelvis: Large hematoma in the lateral left thigh measuring up to 9.6 cm with focus of active contrast extravasation. No acute intrathoracic or intra-abdominal injury. 6/30 CT cervical spine wo contrast: No cervical spine fracture or traumatic malalignment. 6/30 CT head: No acute intracranial pathology. Stable chronic right MCA infarct.     6/19 CT abd/pelvis wo contrast: No acute inflammatory stranding. No hydronephrosis. No renal or ureteric calculi    3/9 VBS: Pt presents with moderate-severe oropharyngeal dysphagia where pt demonstrated weakened lip seal noted to be mildly decreased around tsp, leading to inconsistent trace anterior loss on initial tsp presentations but likely due to increased xerostomia which was observed. Bolus control of thin and NT liquids by tsp, cup and straw was observed to be decreased, leading to premature spill to pharynx prior to swallow. Bolus manipulation is delayed given puree noting spill to pharynx prior to swallow. Mastication was PROLONGED with soft solids (both banana and peaches) administered today. Bolus formation given soft solids was observed to be decreased to where despite prolonged mastication given these textures overall effectiveness in breakdown/formulation was decreased noting that pt did swallow banana >peaches more so whole. Pt did have an episode of brief nasal regurgitation x1 w/ NT by tsp. Transition between with oral and pharyngeal stages was moderately delayed w/ puree, NT and thin textures administered. Hyolaryngeal excursion was decreased given all consistencies. It was observed that BOT retractions is weaker which it was observed that pt did have fairly consistent retention given puree and soft textures. Also observed during assessment was weakened/decreased pharyngeal constriction across textures which lead to moderate amount of pharyngeal retention (more vallecular vs pyriform sinus retention). Pt did also present w/ trace to minimal PPW w/ all consistencies observed. While pt's epiglottis was inverting during swallow, overall timing given airway protection during swallows more w/ NT and thins was decreased. Pt was observed w/ sluggish movement/return of epiglottis intermittently during assessment. It was observed that pt did have a cricopharyngeal prominence around to C5-C6 level which did NOT impinge passage of boluses.  Unfortunately, pt did have increased laryngeal penetration which ranged from high level but then deep to the level of the vocal cords as well as then mostly silent aspiration events w/ thin liquids but also NT by straw. Refer to below for observations for full report for specific event w/ penetration and aspiration. Swallow Information   Current Risks for Dysphagia & Aspiration: known history of dysphagia and known history of aspiration     Current Symptoms/Concerns: RN reports concern for aspiraiton with current regular diet    Current Diet: regular diet and thin liquids      Baseline Diet: puree/level 1 diet, mechanically altered/level 2 diet and nectar thick liquids      Baseline Assessment   Behavior/Cognition: alert    Speech/Language Status: able to participate in conversation and able to follow commands    Patient Positioning: upright in bed- head noted to be in extension at rest- patient reports this is baseline for her    Swallow Mechanism Exam   Facial: slight residual L facial droop  Labial: reduced strength  Lingual: mildly reduced strength and slight deviation  Velum: unable to visualize  Mandible: adequate ROM  Dentition: some missing  Vocal quality:mildly reduced volume   Volitional Cough: fair   Resp: RA initially- placed on NC just prior to the end of my visit    Consistencies Assessed and Performance   Consistencies Administered: nectar thick and puree  Specific materials administered included: applesauce, nectar thick water    Oral Stage:  Oral acceptance and containment appear adequate ( prior dried anterior spill of previously administered material however noted on R- ?thins). Bolus formation and transfer were effortful appearing but functional with no significant oral residue noted. Cannot r/o premature spill. Pharyngeal Stage:   Swallowing initiation appeared mildly delayed and effortful ( likely given head extension). Laryngeal rise was palpated and judged to be reduced.  No coughing, throat clearing, or change in vocal quality noted today. She did report increased periods of SOB with correlating drop in SPO2- this was also noted prior to po intake. This improved when patient was reclined. MD present in room towards the end of my visit and agreed to place supplemental O2 at this time. ( patient reports she wears 2L at home at night). She reported that this helped and SPO2 remained WFL subsequently. Esophageal Concerns: none reported however has had prior episodes of globus sensation with solids    Summary   Pts oropharyngeal swallow function appears consistent with baseline function. Patient politely refused soft solids today- given head extension and swallow effort- may pose increased risk with soft solids? Requires additional assessment. May benefit from repeat objective imaging as she has participated in some dysphagia therapy since last VBS.     Recommendations: puree/level 1 diet and nectar thick liquids     Recommended Form of Meds: crushed with puree- small ones ok whole    Aspiration precautions and compensatory swallowing strategies: upright posture, only feed when fully alert, slow rate of feeding, small bites/sips, no straws and alternating bites and sips    Results Reviewed with: patient, RN and MD     Dysphagia Goals: pt will tolerate dysphagia 2 with nectar thick liquids without s/s of aspiration x3 and pt will participate in VBS as indicated      Plan  Will f/u      Brandon Cason M.S., [address] [facility]  Speech Language Pathologist   Available via [address] [place] [address] #[phone]  [place] #[id number]

## 2023-07-01 NOTE — ASSESSMENT & PLAN NOTE
Chronic dysphagia  - speech consult placed  - will start gentle hydration as pt dry appearing and not tolerating much PO

## 2023-07-01 NOTE — ASSESSMENT & PLAN NOTE
Holding home Eliquis but did not attempt reversal due to recent stroke  Trend H/H, currently downtrending will continue close monitoring  Will dc abdominal binder

## 2023-07-01 NOTE — PROGRESS NOTES
[facility]  Progress Note  Name: Abbie Pinto  MRN: [id number]  Unit/Bed#: [facility] [place] 410-97 I Date of Admission: 6/30/2023   Date of Service: 7/1/2023 I Hospital Day: 0    Assessment/Plan   Dysphagia  Assessment & Plan  Chronic dysphagia  - speech consult placed  - will start gentle hydration as pt dry appearing and not tolerating much PO    Hypercalcemia  Assessment & Plan  Continue home cinacalcet     Stroke [facility])  Assessment & Plan  Recent stroke February 2023  Will hold Eliquis for now, consider early resumption if hgb stable     Bipolar depression ([address])  Assessment & Plan  Continue home wellbutrin, cymbalta     CHF (congestive heart failure) ([address])  Assessment & Plan  Wt Readings from Last 3 Encounters:   06/30/23 55.3 kg (122 lb)   06/06/23 47.9 kg (105 lb 9.6 oz)   05/01/23 56.8 kg (125 lb 3.2 oz)       - continue home metoprolol and aldactone      * Leg hematoma, left, initial encounter  Assessment & Plan  Holding home Eliquis but did not attempt reversal due to recent stroke  Trend H/H, currently downtrending will continue close monitoring  Will dc abdominal binder               Bowel Regimen: senna  VTE Prophylaxis:Sequential compression device (Venodyne)  and Reason for no pharmacologic prophylaxis hematoma     Disposition: continue trauma admission     Subjective   Chief Complaint: leg pain    Subjective: 71 y/o F presenting with left leg hematoma after mechanical fall. Pt reports mild pain in left thigh/leg but overall is tolerable. Reports normal sensation in leg.       Objective   Vitals:   Temp:  [97.3 °F (36.3 °C)-98.9 °F (37.2 °C)] 98.3 °F (36.8 °C)  HR:  [] 114  Resp:  [18-22] 18  BP: ()/(51-80) 130/75    I/O       06/29 0701 06/30 0700 06/30 0701 07/01 0700 07/01 0701  07/02 0700    Urine (mL/kg/hr)  180     Total Output  180     Net  -180                   Physical Exam:     General: NAD, appears older than stated age  HEENT: NC/AT, PERRL, dry MM  Neck: No JVD, trachea midline  CV: RRR, no m/r/g  Pulm: effort WNL, CTAB, no wheezes/rales/rhonchi  GI: soft, NT/ND, no rebound/guarding  MSK: large hematoma and ecchymosis to left hip/upper thigh, soft, mild tenderness, distal pulses intact  Neuro: AAOx3, CN II-XII grossly intact, no focal motor or sensory deficits  Skin: no rashes/wounds        Invasive Devices     Peripheral Intravenous Line  Duration           Peripheral IV 07/01/23 Left;Upper;Ventral (anterior) Arm <1 day                      Lab Results:   Results: I have personally reviewed all pertinent laboratory/tests results, BMP/CMP:   Lab Results   Component Value Date    SODIUM 142 07/01/2023    K 4.3 07/01/2023     (H) 07/01/2023    CO2 25 07/01/2023    BUN 12 07/01/2023    CREATININE 1.18 07/01/2023    CALCIUM 8.6 07/01/2023    AST 29 06/30/2023    ALT 13 06/30/2023    ALKPHOS 51 06/30/2023    EGFR 47 07/01/2023   , CBC:   Lab Results   Component Value Date    WBC 7.57 07/01/2023    HGB 8.6 (L) 07/01/2023    HCT 27.4 (L) 07/01/2023    MCV 94 07/01/2023     07/01/2023    RBC 2.92 (L) 07/01/2023    MCH 29.5 07/01/2023    MCHC 31.4 07/01/2023    RDW 15.1 07/01/2023    MPV 10.8 07/01/2023    and Coagulation:   Lab Results   Component Value Date    INR 1.33 (H) 07/01/2023     Imaging: I have personally reviewed pertinent reports.      Other Studies:

## 2023-07-01 NOTE — H&P
H&P - Trauma   Niesha Dunn 70 y.o. female MRN: [id number]  Unit/Bed#: ED 27 Encounter: [id number]    Trauma Alert: Evaluation; trauma team notified at 745pm via text   Model of Arrival: Transfer [facility] [place]    Trauma Team: Attending Blanca Champion and Residents Haylie Sauer  Consultants:     None     Assessment/Plan   Active Problems / Assessment:   -Status post mechanical fall from standing this morning, fell down her front porch steps landing on her left side, denies head strike, denies LOC, on Eliquis  -Left lateral thigh hematoma with evidence of extravasation on imaging  -Hemoglobin 9.3 from 10.7      Plan:   -Admit to trauma service for observation, stepdown 2  -will not reverse with Kcentra given recent history of stroke in February  -Continue to monitor hemoglobin  -Holding chemical DVT prophylaxis  -Binder present over hips for tamponade of hematoma, continue to monitor left lower extremity exam specifically thigh compartment  -Diet as tolerated  -Pain control  -PT/OT    History of Present Illness     Chief Complaint: Left hip/left leg pain  Mechanism:Fall     HPI:    Niesha Dunn is a 70 y.o. female w PMH HTN, hx of CVA on Eliquis, CHF, GERD, SHAR, gout, bipolar depression, RA, presenting as a trauma transfer from [facility] [place] after mechanical fall down her front porch stairs earlier today, found to have left leg/left hip hematoma with evidence of active extravasation on CT imaging. Patient is poor historian and not able to give much detail of the event but apparently this morning had gotten up thinking she was opening the door to the bathroom when she opened her front door and fell down the front porch stairs, hit and landed on her left hip/left side with immediate pain to the area, she does not think she hit her head, denies loss of consciousness, does take Eliquis for history of recent stroke in February 2023.   Patient apparently had hypotension with systolics in the 96D in triage at [facility] but has since normalized. Also of note patient has history of difficulty with ambulation and multiple prior falls with multiple prior/healing injuries seen on imaging, patient states she usually requires assistance with ambulation. Has left sided deficits from right sided stroke in February. Reports pain in the left hip/left leg, denies any numbness or tingling. GCS 15 on evaluation, hemodynamically stable. Review of Systems   Constitutional: Negative for chills and fever. Respiratory: Negative for shortness of breath. Cardiovascular: Negative for chest pain. Gastrointestinal: Negative for abdominal distention, abdominal pain, constipation, diarrhea, nausea and vomiting. Genitourinary: Negative for difficulty urinating. Musculoskeletal: Negative for back pain and neck pain. Skin: Positive for color change. Neurological: Positive for weakness. Negative for dizziness, syncope, light-headedness, numbness and headaches. Psychiatric/Behavioral: Negative for agitation and confusion. The patient is not nervous/anxious. All other systems reviewed and are negative. 12-point, complete review of systems was reviewed and negative except as stated above.      Historical Information     Past Medical History:   Diagnosis Date   • Anxiety    • Asthma    • Bipolar depression ([address])    • Chronic narcotic dependence ([address])    • Chronic pain    • Depression    • Enterovirus heart infection    • SHAR (obstructive sleep apnea)    • Osteoarthritis    • Peripheral neuropathy    • Plantar fasciitis of right foot    • Senile osteoporosis    • Seronegative arthropathy of multiple sites [facility])    • Undifferentiated connective tissue disease ([address])      Past Surgical History:   Procedure Laterality Date   • BREAST IMPLANT     • FIXATION KYPHOPLASTY LUMBAR SPINE     • HYSTERECTOMY     • INCONTINENCE SURGERY N/A    • IR STROKE ALERT  2/12/2023   • NASAL SEPTOPLASTY W/ TURBINOPLASTY N/A    • RECTAL PROLAPSE REPAIR N/A    • REPAIR RECTOCELE     • RHINOPLASTY N/A         Social History     Tobacco Use   • Smoking status: Former   • Smokeless tobacco: Never   Vaping Use   • Vaping Use: Never used   Substance Use Topics   • Alcohol use: Not Currently     Comment: quit   • Drug use: Not Currently     Types: Marijuana     Comment: has medical card     Immunization History   Administered Date(s) Administered   • COVID-19 MODERNA VACC 0.5 ML IM 03/30/2021, 05/12/2021, 05/12/2021   • COVID-19 Moderna Vac BIVALENT 12 Yr+ IM (BOOSTER ONLY) 0.5 ML 03/30/2021   • H1N1, All Formulations 10/26/2009   • INFLUENZA 09/18/2012, 12/19/2013, 10/23/2014, 11/13/2014, 10/06/2015, 10/13/2016, 10/11/2017, 12/07/2017, 10/16/2018, 10/30/2020   • Influenza, seasonal, injectable 10/11/2017   • Pneumococcal Conjugate 13-Valent 08/06/2019, 08/06/2019   • Pneumococcal Polysaccharide PPV23 02/09/2021, 02/09/2021   • Td (adult), Unspecified 07/21/2021   • Tdap 02/21/2014, 11/21/2016, 07/21/2021, 07/21/2021   • Tuberculin Skin Test 03/25/2023, 04/04/2023     Last Tetanus: unknown  Family History: Non-contributory    1. Before the illness or injury that brought you to the Emergency, did you need someone to help you on a regular basis? 1=Yes   2. Since the illness or injury that brought you to the Emergency, have you needed more help than usual to take care of yourself? 1=Yes   3. Have you been hospitalized for one or more nights during the past 6 months (excluding a stay in the Emergency Department)? 1=Yes   4. In general, do you see well? 0=Yes   5. In general, do you have serious problems with your memory? 0=No   6. Do you take more than three different medications everyday?  1=Yes   TOTAL   4     Did you order a geriatric consult if the score was 2 or greater?: yes     Meds/Allergies   all current active meds have been reviewed     Allergies   Allergen Reactions   • Cephalosporins Other (See Comments)     rash   • Dye [Iodinated Contrast Media] Anaphylaxis     IVP dye   • Iodine - Food Allergy Anaphylaxis     Anaphylaxis     • Methocarbamol Hives   • Sulfa Antibiotics Hives   • Bactrim [Sulfamethoxazole-Trimethoprim] Hives   • Erythromycin Hives   • Penicillins    • Shrimp Extract Allergy Skin Test - Food Allergy Other (See Comments)   • Trimethoprim Hives       Objective   Initial Vitals:   Temperature: (!) 97.3 °F (36.3 °C) (06/30/23 1940)  Pulse: 104 (06/30/23 1943)  Respirations: 18 (06/30/23 1943)  Blood Pressure: 95/51 (06/30/23 1943)    Primary Survey:   Airway:        Status: patent;        Pre-hospital Interventions: none        Hospital Interventions: none  Breathing:        Pre-hospital Interventions: none       Effort: normal       Right breath sounds: normal       Left breath sounds: normal  Circulation:        Rhythm: regular       Rate: regular   Right Pulses Left Pulses    R radial: 2+  R femoral: 2+  R pedal: 2+  R carotid: 2+   L radial: 2+  L femoral: 2+  L pedal: 2+  L carotid: 2+     Disability:        GCS: Eye: 4; Verbal: 5 Motor: 6 Total: 15       Right Pupil: round;  reactive         Left Pupil:  round;  reactive      R Motor Strength L Motor Strength    R : 5/5  R dorsiflex: 5/5  R plantarflex: 5/5 L : 4/5  L dorsiflex: 4/5  L plantarflex: 4/5        Sensory:  No sensory deficit  Exposure:       Completed: Yes      Secondary Survey:  Physical Exam  Vitals reviewed. Constitutional:       General: She is not in acute distress. Appearance: She is not toxic-appearing. HENT:      Head: Normocephalic and atraumatic. Nose: Nose normal.      Mouth/Throat:      Mouth: Mucous membranes are moist.   Eyes:      Extraocular Movements: Extraocular movements intact. Cardiovascular:      Rate and Rhythm: Normal rate and regular rhythm. Pulses: Normal pulses. Heart sounds: Normal heart sounds. Pulmonary:      Effort: Pulmonary effort is normal. No respiratory distress. Breath sounds: Normal breath sounds.       Comments: No C/T/L-spine tenderness  Abdominal:      General: There is no distension. Palpations: Abdomen is soft. Tenderness: There is no abdominal tenderness. Musculoskeletal:         General: Tenderness present. No swelling or deformity. Normal range of motion. Cervical back: Normal range of motion. Legs:    Skin:     General: Skin is warm. Capillary Refill: Capillary refill takes less than 2 seconds. Coloration: Skin is not jaundiced or pale. Findings: Bruising present. Neurological:      Mental Status: She is alert and oriented to person, place, and time. Comments: Known left-sided weakness from right-sided stroke this past February, 4/5 compared to the right 5/5, sensory intact   Psychiatric:         Mood and Affect: Mood normal.         Invasive Devices     Peripheral Intravenous Line  Duration           Peripheral IV 06/30/23 Right Antecubital <1 day              Lab Results:   Results: I have personally reviewed all pertinent laboratory/tests results, BMP/CMP:   Lab Results   Component Value Date    SODIUM 137 06/30/2023    K 3.7 06/30/2023     06/30/2023    CO2 25 06/30/2023    BUN 13 06/30/2023    CREATININE 1.07 06/30/2023    CALCIUM 8.8 06/30/2023    AST 29 06/30/2023    ALT 13 06/30/2023    ALKPHOS 51 06/30/2023    EGFR 53 06/30/2023   , CBC:   Lab Results   Component Value Date    WBC 8.96 06/30/2023    HGB 9.3 (L) 06/30/2023    HCT 28.9 (L) 06/30/2023    MCV 94 06/30/2023     06/30/2023    RBC 3.65 (L) 06/30/2023    MCH 29.3 06/30/2023    MCHC 31.3 (L) 06/30/2023    RDW 14.6 06/30/2023    MPV 11.1 06/30/2023   , Coagulation:   Lab Results   Component Value Date    INR 1.65 (H) 06/30/2023   , CK:   Lab Results   Component Value Date    CKTOTAL 545 (H) 06/30/2023    and Troponin: No results found for: "TROPONINI"    Imaging Results: I have personally reviewed pertinent reports.     Chest Xray(s): N/A   FAST exam(s): N/A   CT Scan(s): positive for acute findings: left leg hematoma   Additional Xray(s): N/A     Other Studies:      Code Status: Level 1 - Full Code  Advance Directive and Living Will:      Power of :    POLST:

## 2023-07-01 NOTE — RESPIRATORY THERAPY NOTE
RT Protocol Note  Lawrence Ortega 70 y.o. female MRN: [id number]  [facility]: [id number]    Assessment    Active Problems: There are no active Hospital Problems. Home Pulmonary Medications:  albuterol       Past Medical History:   Diagnosis Date    Anxiety     Asthma     Bipolar depression (HCC)     Chronic narcotic dependence (HCC)     Chronic pain     Depression     Enterovirus heart infection     SHAR (obstructive sleep apnea)     Osteoarthritis     Peripheral neuropathy     Plantar fasciitis of right foot     Senile osteoporosis     Seronegative arthropathy of multiple sites ([address])     Undifferentiated connective tissue disease ([address])      Social History     Socioeconomic History    Marital status:      Spouse name: None    Number of children: None    Years of education: None    Highest education level: None   Occupational History    None   Tobacco Use    Smoking status: Former    Smokeless tobacco: Never   Vaping Use    Vaping Use: Never used   Substance and Sexual Activity    Alcohol use: Not Currently     Comment: quit    Drug use: Not Currently     Types: Marijuana     Comment: has medical card    Sexual activity: None   Other Topics Concern    None   Social History Narrative    None     Social Determinants of Health     Financial Resource Strain: Not on file   Food Insecurity: No Food Insecurity (5/1/2023)    Hunger Vital Sign     Worried About Running Out of Food in the Last Year: Never true     Ran Out of Food in the Last Year: Never true   Transportation Needs: No Transportation Needs (5/1/2023)    PRAPARE - Transportation     Lack of Transportation (Medical): No     Lack of Transportation (Non-Medical):  No   Physical Activity: Not on file   Stress: Not on file   Social Connections: Not on file   Intimate Partner Violence: Not on file   Housing Stability: Low Risk  (5/1/2023)    Housing Stability Vital Sign     Unable to Pay for Housing in the Last Year: No     Number of Places Lived in the Last Year: 1     Unstable Housing in the Last Year: No       Subjective         Objective    Physical Exam:   Assessment Type: (P) Assess only  General Appearance: (P) Awake  Respiratory Pattern: (P) Normal  Chest Assessment: (P) Chest expansion symmetrical  Bilateral Breath Sounds: (P) Diminished, Clear    Vitals:  Blood pressure 130/75, pulse (!) 114, temperature 98.3 °F (36.8 °C), resp. rate 18, height 5' 3" (1.6 m), weight 55.3 kg (122 lb), SpO2 97 %. Imaging and other studies: I have personally reviewed pertinent reports. Plan    Respiratory Plan: (P) Discontinue Protocol, Home Bronchodilator Patient pathway        Resp Comments: (P) pt assessed at this time for respiratory protocol. pt has history of asthma. pt uses prn albuterol mdi at home. will continue with order at this time. pt is in NAD at this time. will dc respiratory protocol.

## 2023-07-01 NOTE — ASSESSMENT & PLAN NOTE
Wt Readings from Last 3 Encounters:   06/30/23 55.3 kg (122 lb)   06/06/23 47.9 kg (105 lb 9.6 oz)   05/01/23 56.8 kg (125 lb 3.2 oz)       - continue home metoprolol and aldactone

## 2023-07-02 ENCOUNTER — HOME CARE VISIT (OUTPATIENT)
Dept: HOME HEALTH SERVICES | Facility: HOME HEALTHCARE | Age: 68
End: 2023-07-02
Payer: MEDICARE

## 2023-07-02 ENCOUNTER — APPOINTMENT (INPATIENT)
Dept: RADIOLOGY | Facility: HOSPITAL | Age: 68
DRG: 605 | End: 2023-07-02
Payer: MEDICARE

## 2023-07-02 LAB
ABO GROUP BLD: NORMAL
ANION GAP SERPL CALCULATED.3IONS-SCNC: 2 MMOL/L
ATRIAL RATE: 96 BPM
BACTERIA UR CULT: ABNORMAL
BACTERIA UR CULT: ABNORMAL
BLD GP AB SCN SERPL QL: NEGATIVE
BUN SERPL-MCNC: 9 MG/DL (ref 5–25)
CALCIUM SERPL-MCNC: 8.4 MG/DL (ref 8.3–10.1)
CFFMA (FUNCTIONAL FIBRINOGEN MAX AMPLITUDE): 34.8 MM (ref 15–32)
CHLORIDE SERPL-SCNC: 115 MMOL/L (ref 96–108)
CK SERPL-CCNC: 192 U/L (ref 26–192)
CKLY30: 1.1 % (ref 0–2.6)
CKR(REACTION TIME): 5 MIN (ref 4.6–9.1)
CO2 SERPL-SCNC: 27 MMOL/L (ref 21–32)
CREAT SERPL-MCNC: 0.88 MG/DL (ref 0.6–1.3)
CRTMA(RAPIDTEG MAX AMPLITUDE): 68 MM (ref 52–70)
GFR SERPL CREATININE-BSD FRML MDRD: 67 ML/MIN/1.73SQ M
GLUCOSE SERPL-MCNC: 86 MG/DL (ref 65–140)
GLUCOSE SERPL-MCNC: 96 MG/DL (ref 65–140)
HCT VFR BLD AUTO: 21.3 % (ref 34.8–46.1)
HCT VFR BLD AUTO: 21.7 % (ref 34.8–46.1)
HCT VFR BLD AUTO: 23.4 % (ref 34.8–46.1)
HGB BLD-MCNC: 6.8 G/DL (ref 11.5–15.4)
HGB BLD-MCNC: 7 G/DL (ref 11.5–15.4)
HGB BLD-MCNC: 7.3 G/DL (ref 11.5–15.4)
HGB BLD-MCNC: 8.5 G/DL (ref 11.5–15.4)
P AXIS: 58 DEGREES
POTASSIUM SERPL-SCNC: 3.7 MMOL/L (ref 3.5–5.3)
PR INTERVAL: 204 MS
QRS AXIS: 27 DEGREES
QRSD INTERVAL: 98 MS
QT INTERVAL: 402 MS
QTC INTERVAL: 507 MS
RH BLD: NEGATIVE
SODIUM SERPL-SCNC: 144 MMOL/L (ref 135–147)
SPECIMEN EXPIRATION DATE: NORMAL
T WAVE AXIS: 126 DEGREES
VENTRICULAR RATE: 96 BPM

## 2023-07-02 PROCEDURE — P9016 RBC LEUKOCYTES REDUCED: HCPCS

## 2023-07-02 PROCEDURE — 85018 HEMOGLOBIN: CPT

## 2023-07-02 PROCEDURE — 99233 SBSQ HOSP IP/OBS HIGH 50: CPT | Performed by: SURGERY

## 2023-07-02 PROCEDURE — 85384 FIBRINOGEN ACTIVITY: CPT

## 2023-07-02 PROCEDURE — 85347 COAGULATION TIME ACTIVATED: CPT

## 2023-07-02 PROCEDURE — 86850 RBC ANTIBODY SCREEN: CPT

## 2023-07-02 PROCEDURE — 85014 HEMATOCRIT: CPT

## 2023-07-02 PROCEDURE — 97167 OT EVAL HIGH COMPLEX 60 MIN: CPT

## 2023-07-02 PROCEDURE — 80048 BASIC METABOLIC PNL TOTAL CA: CPT

## 2023-07-02 PROCEDURE — 82948 REAGENT STRIP/BLOOD GLUCOSE: CPT

## 2023-07-02 PROCEDURE — 82550 ASSAY OF CK (CPK): CPT

## 2023-07-02 PROCEDURE — 71045 X-RAY EXAM CHEST 1 VIEW: CPT

## 2023-07-02 PROCEDURE — 86901 BLOOD TYPING SEROLOGIC RH(D): CPT

## 2023-07-02 PROCEDURE — 85397 CLOTTING FUNCT ACTIVITY: CPT

## 2023-07-02 PROCEDURE — 93010 ELECTROCARDIOGRAM REPORT: CPT | Performed by: STUDENT IN AN ORGANIZED HEALTH CARE EDUCATION/TRAINING PROGRAM

## 2023-07-02 PROCEDURE — 86923 COMPATIBILITY TEST ELECTRIC: CPT

## 2023-07-02 PROCEDURE — 85576 BLOOD PLATELET AGGREGATION: CPT

## 2023-07-02 PROCEDURE — 97163 PT EVAL HIGH COMPLEX 45 MIN: CPT

## 2023-07-02 PROCEDURE — 86900 BLOOD TYPING SEROLOGIC ABO: CPT

## 2023-07-02 RX ORDER — LORAZEPAM 0.5 MG/1
0.25 TABLET ORAL EVERY 12 HOURS PRN
Status: DISCONTINUED | OUTPATIENT
Start: 2023-07-02 | End: 2023-07-07 | Stop reason: HOSPADM

## 2023-07-02 RX ADMIN — ACETAMINOPHEN 650 MG: 325 TABLET, FILM COATED ORAL at 13:35

## 2023-07-02 RX ADMIN — LAMOTRIGINE 150 MG: 100 TABLET ORAL at 21:43

## 2023-07-02 RX ADMIN — Medication 2.5 MG: at 03:47

## 2023-07-02 RX ADMIN — DULOXETINE HYDROCHLORIDE 60 MG: 60 CAPSULE, DELAYED RELEASE ORAL at 08:52

## 2023-07-02 RX ADMIN — DULOXETINE HYDROCHLORIDE 60 MG: 60 CAPSULE, DELAYED RELEASE ORAL at 17:15

## 2023-07-02 RX ADMIN — ACETAMINOPHEN 650 MG: 325 TABLET, FILM COATED ORAL at 21:43

## 2023-07-02 RX ADMIN — HYDROXYCHLOROQUINE SULFATE 200 MG: 200 TABLET ORAL at 17:15

## 2023-07-02 RX ADMIN — ACETAMINOPHEN 650 MG: 325 TABLET, FILM COATED ORAL at 08:53

## 2023-07-02 RX ADMIN — SPIRONOLACTONE 25 MG: 25 TABLET ORAL at 08:52

## 2023-07-02 RX ADMIN — PRAZOSIN HYDROCHLORIDE 1 MG: 1 CAPSULE ORAL at 21:44

## 2023-07-02 RX ADMIN — BUPROPION HYDROCHLORIDE 100 MG: 100 TABLET, FILM COATED ORAL at 17:15

## 2023-07-02 RX ADMIN — ACETAMINOPHEN 650 MG: 325 TABLET, FILM COATED ORAL at 03:00

## 2023-07-02 RX ADMIN — METOPROLOL SUCCINATE 25 MG: 25 TABLET, EXTENDED RELEASE ORAL at 08:52

## 2023-07-02 RX ADMIN — CINACALCET 30 MG: 30 TABLET, FILM COATED ORAL at 08:51

## 2023-07-02 RX ADMIN — HYDROXYCHLOROQUINE SULFATE 200 MG: 200 TABLET ORAL at 08:51

## 2023-07-02 RX ADMIN — PANTOPRAZOLE SODIUM 40 MG: 40 TABLET, DELAYED RELEASE ORAL at 05:43

## 2023-07-02 NOTE — PHYSICAL THERAPY NOTE
Physical Therapy Evaluation     Patient's Name: Sha Goodman    Admitting Diagnosis  Hematoma of leg, left, initial encounter [S80.12XA]  Unspecified multiple injuries, initial encounter [T07. XXXA]    Problem List  Patient Active Problem List   Diagnosis    Chronic pain syndrome    Mild intermittent asthma without complication    Ambulatory dysfunction    Seronegative arthropathy of multiple sites (HCC)    Gastroesophageal reflux disease without esophagitis    Mixed stress and urge urinary incontinence    CHF (congestive heart failure) (HCC)    Anemia    Undifferentiated connective tissue disease ([address])    Primary generalized (osteo)arthritis    Peripheral neuropathy    Osteoporosis    Hx of compression fracture of spine    Lumbar spondylosis    Cervical spondylosis    Closed fracture of multiple ribs of right side    Traumatic pneumothorax    Closed fracture of transverse process of lumbar vertebra (HCC)    Liver contusion    Fall    Acute pain due to trauma    Right shoulder pain    Severe protein-calorie malnutrition ([facility])    Bipolar depression ([facility])    Rheumatoid arthritis of multiple sites with negative rheumatoid factor ([address])    'light-for-dates' infant with signs of fetal malnutrition    LAILA (acute kidney injury) ([address])    Polypharmacy    Cardiomyopathy ([address])    Vitamin D insufficiency    Stroke (HCC)    HTN (hypertension)    HFrEF (heart failure with reduced ejection fraction) ([facility])    Hypercalcemia    Discoloration of skin of foot    Abdominal pain    Acute CVA (cerebrovascular accident) ([address])    History of bacteremia    Obstructive sleep apnea    History of stroke    AMS (altered mental status)    Gout of foot    Dysphagia    Wound of left ankle    Hypokalemia    Sinus tachycardia    Hypomagnesemia    Insomnia    Nocturnal hypoxia    Stroke-like symptoms    Cerebral aneurysm, nonruptured    Leg hematoma, left, initial encounter       Past Medical History  Past Medical History:   Diagnosis Date Anxiety     Asthma     Bipolar depression (HCC)     Chronic narcotic dependence (HCC)     Chronic pain     Depression     Enterovirus heart infection     SHAR (obstructive sleep apnea)     Osteoarthritis     Peripheral neuropathy     Plantar fasciitis of right foot     Senile osteoporosis     Seronegative arthropathy of multiple sites ([address])     Undifferentiated connective tissue disease ([address])        Past Surgical History  Past Surgical History:   Procedure Laterality Date    BREAST IMPLANT      FIXATION KYPHOPLASTY LUMBAR SPINE      HYSTERECTOMY      INCONTINENCE SURGERY N/A     IR STROKE ALERT  2/12/2023    NASAL SEPTOPLASTY W/ TURBINOPLASTY N/A     RECTAL PROLAPSE REPAIR N/A     REPAIR RECTOCELE      RHINOPLASTY N/A         07/02/23 0949   PT Last Visit   PT Visit Date 07/02/23   Note Type   Note type Evaluation   Pain Assessment   Pain Assessment Tool FLACC   Pain Location/Orientation Orientation: Left; Location: Hip   Pain Rating: FLACC (Rest) - Face 1   Pain Rating: FLACC (Rest) - Legs 1   Pain Rating: FLACC (Rest) - Activity 0   Pain Rating: FLACC (Rest) - Cry 0   Pain Rating: FLACC (Rest) - Consolability 1   Score: FLACC (Rest) 3   Pain Rating: FLACC (Activity) - Face 1   Pain Rating: FLACC (Activity) - Legs 0   Pain Rating: FLACC (Activity) - Activity 0   Pain Rating: FLACC (Activity) - Cry 0   Pain Rating: FLACC (Activity) - Consolability 1   Score: FLACC (Activity) 2   Restrictions/Precautions   Weight Bearing Precautions Per Order No   Braces or Orthoses   (none)   Other Precautions Contact/isolation;Pain; Fall Risk;O2;Bed Alarm; Chair Alarm   Home Living   Type of [address] [place] [address] Two level; Able to live on main level with bedroom/bathroom; Performs ADLs on one level;Stairs to enter with rails  (1 lashawn)   [place]   Additional Comments Patient in pain/speaking softly/challenging to understand.  Per chart review patient was evaluated by PT 04/2023 and at the time she resided with son and brother in a 2 level home (1st floor set up, 1 TAB). I with mobility (RW) and family assists with ADLS. Prior Function   Level of [employer] Independent with ADLs; Independent with functional mobility; Needs assistance with IADLS   Lives With Son  (and brother)   Marcos Case From [facility] in the last 6 months 1 to 4   General   Additional Pertinent History 70 y.o. female admitted to [address] [place] [address] on 6/30/2023 as trauma s/p fall down porch steps. Diagnosis includes left lateral thigh hematoma with evidence of extravasation. Family/Caregiver Present No   Cognition   Arousal/Participation Alert   Orientation Level Oriented X4  (with increased time)   Memory Decreased recall of precautions   Following Commands Follows one step commands with increased time or repetition   Comments baseline bipolar depression, recent CVA- patient speaks very softly   Subjective   Subjective "that was nice"- stated after mobilizing   RUE Assessment   RUE Assessment   (see OT eval)   LUE Assessment   LUE Assessment   (see OT eval)   RLE Assessment   RLE Assessment WFL  (4-/5 grossly)   LLE Assessment   LLE Assessment X  (3+/5 grossly; limited by pain)   Bed Mobility   Supine to Sit 3  Moderate assistance   Additional items Assist x 1;HOB elevated; Increased time required;LE management   Sit to Supine 3  Moderate assistance   Additional items Assist x 1; Increased time required;LE management   Additional Comments post evaluation patient returned to bed with extended time for repositioning (wedges on b/l sides to prevent lateral lean and b/l UE supported on pillows)   Transfers   Sit to Stand 4  Minimal assistance   Additional items Assist x 2; Increased time required;Verbal cues   Stand to Sit 4  Minimal assistance   Additional items Assist x 2; Increased time required;Verbal cues   Additional Comments w/ RW   Ambulation/Elevation   Gait pattern Excessively slow;Decreased foot clearance;Shuffling   Gait Assistance 4  Minimal assist   Additional items Assist x 2   Assistive Device Rolling walker   Distance 3 lateral side steps EOB   Balance   Static Sitting Fair -   Static Standing Poor +   Ambulatory Poor   Endurance Deficit   Endurance Deficit Yes   Endurance Deficit Description gross weakness, fatigue, pain   Activity Tolerance   Activity Tolerance Patient limited by pain; Patient limited by fatigue   Medical Staff Made Aware This high complexity evaluation was performed with an occupational therapist due to the patient's co-morbidities, clinically unstable presentation, and present impairments which are a regression from the patient's baseline. Nurse Made Aware armen to see per RN Vero    Assessment   Prognosis Fair   Problem List Decreased strength;Decreased endurance; Impaired balance;Decreased mobility;Pain;Decreased skin integrity   Assessment PT completed evaluation of 70 y.o. female admitted [facility] on [facility]/30/2023 as trauma s/p fall down porch steps. Diagnosis includes left lateral thigh hematoma with evidence of extravasation. Patient's current status instabilities include ongoing pain, continuous O2/HR monitoring, falls risk, bed alarm, and a regression in function from baseline. PMH is significant for recent CVA (02/2023), dysphagia, bipolar depression, chronic narcotic dependence, osteoporosis, and undifferentiated connective tissue disorder. Patient in pain/speaking softly/challenging to understand. Per chart review patient was evaluated by PT 04/2023 and at the time she resided with son and brother in a 2 level home (1st floor set up, 1 TAB). I with mobility (RW) and family assists with ADLS. Patient presents at time of PT evaluation functioning below baseline and currently w/ overall mobility deficits 2* to: impaired balance, decreased endurance, gait deviations, decreased activity tolerance and fall risk.  During PT evaluation, patient currently is requiring mod-AX1 for bed mobility; min-AX1 for transfers and min-AX1 for ambulation w/ RW. Patient performed 3 lateral side steps EOB and returned to supine at end of session. Did not transfer OOB due to patient fatigue/dropping HBG. This patient is functioning below their baseline and is recommended for rehab Patient will continue to benefit from continued skilled PT this admission to achieve maximal function and safety. Goals   Patient Goals did not express, agreeable to attempt participation in mobility   LTG Expiration Date 07/16/23   Long Term Goal #1 1) Perform bed mobility mod-I to participate in frequent repositioning and improve skin integrity; 2) Perform functional transfers mod-I to promote I with toileting and OOB mobility; 3) Ambulate 150 feet mod-I with RW to participate in household and community level mobility; 4) Improve b/l LE strength by 1/2 grade in order to improve efficiency of tranfers; 5) Improve balance by 1 grade to reduce risk for falls; 6) Improve overall activity tolerance to 60 minutes in order to increase patient's ability to engage in mobility tasks; 7) Navigate 1 steps S level in order to safely navigate in/out of  home   PT Treatment Day 0   Plan   Treatment/Interventions Functional transfer training;LE strengthening/ROM; Therapeutic exercise; Endurance training;Patient/family training;Equipment eval/education; Bed mobility;Gait training;OT;Spoke to nursing   PT Frequency 2-3x/wk   Recommendation   PT Discharge Recommendation Post acute rehabilitation services   Equipment Recommended [address] Mobility Inpatient   Turning in Flat Bed Without Bedrails 2   Lying on Back to Sitting on Edge of Flat Bed Without Bedrails 2   Moving Bed to Chair 1   Standing Up From Chair Using Arms 1   Walk in Room 1   Climb 3-5 Stairs With Railing 1   Basic Mobility Inpatient Raw Score 8   Turning Head Towards Sound 3   Follow Simple Instructions 3   Low Function Basic Mobility Raw Score  14   Low Function Basic Mobility Standardized Score  22.01   Highest Level Of Mobility   -[facility] Goal 3: Sit at edge of bed   JH-HLM Achieved 3: Sit at edge of bed     The patient's AM-PAC Basic Mobility Inpatient Standardized Score is less than 42.9, suggesting this patient may benefit from discharge to post-acute rehabilitation services. Please also refer to the recommendation of the Physical Therapist for safe discharge planning.       Brina Stuart, PT, DPT

## 2023-07-02 NOTE — PLAN OF CARE
Problem: PHYSICAL THERAPY ADULT  Goal: Performs mobility at highest level of function for planned discharge setting. See evaluation for individualized goals. Description: Treatment/Interventions: Functional transfer training, LE strengthening/ROM, Therapeutic exercise, Endurance training, Patient/family training, Equipment eval/education, Bed mobility, Gait training, OT, Spoke to nursing  Equipment Recommended: Lillian Walker       See flowsheet documentation for full assessment, interventions and recommendations. Note: Prognosis: Fair  Problem List: Decreased strength, Decreased endurance, Impaired balance, Decreased mobility, Pain, Decreased skin integrity  Assessment: PT completed evaluation of 70 y.o. female admitted to [address] [place] [address] on 6/30/2023 as trauma s/p fall down porch steps. Diagnosis includes left lateral thigh hematoma with evidence of extravasation. Patient's current status instabilities include ongoing pain, continuous O2/HR monitoring, falls risk, bed alarm, and a regression in function from baseline. PMH is significant for recent CVA (02/2023), dysphagia, bipolar depression, chronic narcotic dependence, osteoporosis, and undifferentiated connective tissue disorder. Patient in pain/speaking softly/challenging to understand. Per chart review patient was evaluated by PT 04/2023 and at the time she resided with son and brother in a 2 level home (1st floor set up, 1 TAB). I with mobility (RW) and family assists with ADLS. Patient presents at time of PT evaluation functioning below baseline and currently w/ overall mobility deficits 2* to: impaired balance, decreased endurance, gait deviations, decreased activity tolerance and fall risk. During PT evaluation, patient currently is requiring mod-AX1 for bed mobility; min-AX1 for transfers and min-AX1 for ambulation w/ RW. Patient performed 3 lateral side steps EOB and returned to supine at end of session.  Did not transfer OOB due to patient fatigue/dropping HBG. This patient is functioning below their baseline and is recommended for rehab Patient will continue to benefit from continued skilled PT this admission to achieve maximal function and safety. PT Discharge Recommendation: Post acute rehabilitation services    See flowsheet documentation for full assessment.

## 2023-07-02 NOTE — OCCUPATIONAL THERAPY NOTE
Occupational Therapy Evaluation     Patient Name: Yaz Herrera  YDVVS'Z Date: 7/2/2023  Problem List  Principal Problem:    Leg hematoma, left, initial encounter  Active Problems:    CHF (congestive heart failure) ([facility])    Bipolar depression ([address])    Stroke ([address])    Hypercalcemia    Dysphagia    Past Medical History  Past Medical History:   Diagnosis Date    Anxiety     Asthma     Bipolar depression ([address])     Chronic narcotic dependence ([facility])     Chronic pain     Depression     Enterovirus heart infection     SHAR (obstructive sleep apnea)     Osteoarthritis     Peripheral neuropathy     Plantar fasciitis of right foot     Senile osteoporosis     Seronegative arthropathy of multiple sites ([address])     Undifferentiated connective tissue disease ([address])      Past Surgical History  Past Surgical History:   Procedure Laterality Date    BREAST IMPLANT      FIXATION KYPHOPLASTY LUMBAR SPINE      HYSTERECTOMY      INCONTINENCE SURGERY N/A     IR STROKE ALERT  2/12/2023    NASAL SEPTOPLASTY W/ TURBINOPLASTY N/A     RECTAL PROLAPSE REPAIR N/A     REPAIR RECTOCELE      RHINOPLASTY N/A          07/02/23 0950   OT Last Visit   OT Visit Date 07/02/23   Note Type   Note type Evaluation   Pain Assessment   Pain Assessment Tool FLACC   Pain Rating: FLACC (Rest) - Face 1   Pain Rating: FLACC (Rest) - Legs 1   Pain Rating: FLACC (Rest) - Activity 0   Pain Rating: FLACC (Rest) - Cry 0   Pain Rating: FLACC (Rest) - Consolability 1   Score: FLACC (Rest) 3   Pain Rating: FLACC (Activity) - Face 1   Pain Rating: FLACC (Activity) - Legs 0   Pain Rating: FLACC (Activity) - Activity 0   Pain Rating: FLACC (Activity) - Cry 1   Pain Rating: FLACC (Activity) - Consolability 1   Score: FLACC (Activity) 3   Restrictions/Precautions   Weight Bearing Precautions Per Order No   Other Precautions Contact/isolation;O2;Fall Risk;Pain   Home Living   Type of Home House   Home Layout Two level; Able to live on main level with bedroom/bathroom;Stairs to enter with rails   [place]   Prior Function   Level of Ong Independent with ADLs; Independent with functional mobility; Needs assistance with IADLS   Lives With Family;Son  (son and brother)   [place] Due Help From Family   IADLs Family/Friend/Other provides transportation; Family/Friend/Other provides meals; Family/Friend/Other provides medication management   Falls in the last 6 months 1 to 4   Vocational Retired   [address] and mobility however +falls - family assists with iadls   Reciprocal Relationships supportive family   Service to Others retired   Intrinsic Gratification sedentary   Subjective   Subjective offers no c/o   ADL   Eating Assistance 4  Minimal Assistance   Grooming Assistance 4  Minimal Assistance   [address] 3  Moderate Assistance    [address] 3  Moderate Assistance   [address] 3  Moderate Assistance   [address] 3  Moderate [address]  3  Moderate Assistance   Bed Mobility   Supine to Sit 3  Moderate assistance   Sit to Supine 3  Moderate assistance   Transfers   Sit to Stand 4  Minimal assistance   Additional items Assist x 2   Stand to Sit 4  Minimal assistance   Additional items Assist x 2   Additional Comments min a x 2 to take several lateral sidesteps towards [facility]   Balance   Static Sitting Fair   Dynamic Sitting Fair -   Static Standing Poor +   Dynamic Standing Poor   Ambulatory Poor   Activity Tolerance   Activity Tolerance Patient limited by fatigue;Patient limited by pain;Treatment limited secondary to medical complications (Comment)   Nurse Made Aware ok to see per RN Toy Quale however HbB low but vitals stable   RUE Assessment   RUE Assessment WFL   LUE Assessment   LUE Assessment X  (limited ROM/increased tone 2* prior CVA)   Hand Function   Gross Motor Coordination Impaired  (LUEi)   Fine Motor Coordination Impaired  (LUE)   Cognition   Arousal/Participation Arousable; Cooperative;Persistent stimuli required   Attention Attends with cues to redirect   Orientation Level Oriented X4   Memory Decreased recall of recent events;Decreased short term memory;Decreased recall of precautions   Following Commands Follows one step commands with increased time or repetition   Comments speech slow and difficult to understand   Assessment   Limitation Decreased ADL status; Decreased UE ROM; Decreased Safe judgement during ADL;Decreased cognition;Decreased endurance;Decreased self-care trans;Decreased high-level ADLs   Prognosis Good;Fair   Assessment Pt is a 70 y.o. female who was admitted to [facility] on 6/30/2023 with Leg hematoma, left, initial encounter - reports waking to go to BR and mistaking front door for BR door falling down front steps . Pt's problem list also includes PMH of HTN, underlying neurological disorder and CVA with L hemiplegia, CHF, GERD SHAR, bipolor depression, RA. At baseline pt was completing basic adls and mobility independently however admits to multiple falls - family manages iadls. Pt lives with family in 2 story home with [facility]. Currently pt requires mod to max assist for overall ADLS and min to mod a x 2  for functional mobility/transfers. Pt currently presents with impairments in the following categories -steps to enter environment, difficulty performing ADLS, difficulty performing IADLS , limited insight into deficits, compliance, flat affect, decreased initiation and engagement , health management  and environment activity tolerance, endurance, standing balance/tolerance, sitting balance/tolerance, UE ROM, FMC, arousal, memory, insight, safety , judgement , attention , sequencing , sensation , task initiation  and communication.  These impairments, as well as pt's fatigue, pain, abnormal tone, (L) hemiplegia, decreased caregiver support, risk for falls and home environment  limit pt's ability to safely engage in all baseline areas of occupation, includingeating, grooming, bathing, dressing, toileting, functional mobility/transfers, community mobility, laundry , house maintenance, meal prep, cleaning, social participation  and leisure activities  From OT standpoint, recommend inpt rehab upon D/C. OT will continue to follow to address the below stated goals. Goals   Patient Goals none stated at this time   LTG Time Frame 10-14   Long Term Goal #1 refer to established goals below   Plan   Treatment Interventions ADL retraining;Functional transfer training;UE strengthening/ROM; Endurance training;Cognitive reorientation;Patient/family training;Equipment evaluation/education; Compensatory technique education; Activityengagement   Goal Expiration Date 07/16/23   OT Frequency 2-3x/wk   Recommendation   OT Discharge Recommendation Post acute rehabilitation services   AM-PAC Daily Activity Inpatient   Lower Body Dressing 2   Bathing 2   Toileting 2   Upper Body Dressing 2   Grooming 3   Eating 4   Daily Activity Raw Score 15   Daily Activity Standardized Score (Calc for Raw Score >=11) 34.69   AM-PAC Applied Cognition Inpatient   Following a Speech/Presentation 2   Understanding Ordinary Conversation 3   Taking Medications 2   Remembering Where Things Are Placed or Put Away 3   Remembering List of 4-5 Errands 2   Taking Care of Complicated Tasks 2   Applied Cognition Raw Score 14   Applied Cognition Standardized Score 32.02   End of Consult   Education Provided Yes   Patient Position at End of Consult Bed/Chair alarm activated;Supine; All needs within reach   Nurse Communication Nurse aware of consult       OCCUPATIONAL THERAPY GOALS:      *S feeding/grooming after setup  *Min a adls after setup with min cues to initiate, sequence and complete tasks PRN  *Min a toileting and clothing management  *Min a bed mobility with fair to fair+ sitting balance on EOB to engage in light grooming/self care and enjoyable activities  *Min a transfers on/off all surfaces with fair to fair+ balance/safety  *Demonstrate fair to fair+ carryover with safe use of RW during functional tasks   *Increase dynamic balance to fair for improved safety during participation in adls and iadl tasks   *Increase activity tolerance to 25-30 min for participation in adls and enjoyable activities  *Assess DME needs  *Pt to participate in ongoing functional cognitive assessment with fair to fair+ attention/concentration to assist with safe d/c recommendations          The patient's raw score on the AM-PAC Daily Activity Inpatient Short Form is 15. A raw score of less than 19 suggests the patient may benefit from discharge to post-acute rehabilitation services. Please refer to the recommendation of the Occupational Therapist for safe discharge planning.       [facility]

## 2023-07-02 NOTE — ASSESSMENT & PLAN NOTE
- Chronic dysphagia  - speech consult placed  - will start gentle hydration as pt dry appearing and not tolerating much PO

## 2023-07-02 NOTE — PROGRESS NOTES
[facility]  Progress Note  Name: Laddie Goodpasture  MRN: [id number]  Unit/Bed#: [facility]PHP 375-66 I Date of Admission: 6/30/2023   Date of Service: 7/2/2023 I Hospital Day: 1    Assessment/Plan   Dysphagia  Assessment & Plan  - Chronic dysphagia  - speech consult placed  - will start gentle hydration as pt dry appearing and not tolerating much PO    Hypercalcemia  Assessment & Plan  - Continue home cinacalcet     Stroke [facility])  Assessment & Plan  - Recent stroke February 2023  - Will hold Eliquis for now, consider early resumption if hgb stable     Bipolar depression ([address])  Assessment & Plan  - Continue home wellbutrin, cymbalta     CHF (congestive heart failure) ([address])  Assessment & Plan  Wt Readings from Last 3 Encounters:   06/30/23 55.3 kg (122 lb)   06/06/23 47.9 kg (105 lb 9.6 oz)   05/01/23 56.8 kg (125 lb 3.2 oz)       - continue home metoprolol and aldactone      * Leg hematoma, left, initial encounter  Assessment & Plan  - Holding home Eliquis but did not attempt reversal due to recent stroke  - Trend H/H, currently downtrending will continue close monitoring  - Abdominal binder discontinued  - Hgb 6.8 on 7/2, 1 u pRBC ordered for transfusion               TRAUMA TERTIARY SURVEY NOTE    VTE Prophylaxis:RX contraindicated due to: leg hematoma     Disposition: pending PT/OT eval    Code status:  Level 1 - Full Code    Consultants: IP CONSULT TO GERONTOLOGY  IP CONSULT TO CASE MANAGEMENT    Subjective   Transfer from: [facility]    Mechanism of Injury:Fall     Chief Complaint: left leg pain    HPI/Last 24 hour events: Patient intermittently sleepy and confused. As per discussion with her daughter on the phone, this is not abnormal for her, especially in the morning as she is awakening.      Objective   Vitals:   Temp:  [97.9 °F (36.6 °C)-98.7 °F (37.1 °C)] 97.9 °F (36.6 °C)  HR:  [] 94  Resp:  [14-18] 14  BP: ()/(55-84) 98/55    I/O       06/30 0701  07/01 0700 07/01 0701 07/02 0700 07/02 0701 07/03 0700    I.V. (mL/kg)  415 (7.5)     Total Intake(mL/kg)  415 (7.5)     Urine (mL/kg/hr) 180 100 (0.1)     Stool  0     Total Output 180 100     Net -180 +315            Unmeasured Urine Occurrence  1 x     Unmeasured Stool Occurrence  2 x            Physical Exam:   General: awake and alert  CV: RRR  Pulm: CTAB  Abd: soft and non-tender  Neuro: follows commands in all extremities, sleepy but rousable  : lopes present  Skin: no new rashes    Invasive Devices     Peripheral Intravenous Line  Duration           Long-Dwell Peripheral IV (Midline) 07/01/23 Left Brachial 1 day                   1. Before the illness or injury that brought you to the Emergency, did you need someone to help you on a regular basis? 0=No   2. Since the illness or injury that brought you to the Emergency, have you needed more help than usual to take care of yourself? 1=Yes   3. Have you been hospitalized for one or more nights during the past 6 months (excluding a stay in the Emergency Department)? 1=Yes   4. In general, do you see well? 1=No   5. In general, do you have serious problems with your memory? 1=Yes   6. Do you take more than three different medications everyday? 1=Yes   TOTAL   5     Did you order a geriatric consult if the score was 2 or greater?: yes         Lab Results:   Results: I have personally reviewed all pertinent laboratory/tests results, BMP/CMP:   Lab Results   Component Value Date    SODIUM 144 07/02/2023    K 3.7 07/02/2023     (H) 07/02/2023    CO2 27 07/02/2023    BUN 9 07/02/2023    CREATININE 0.88 07/02/2023    CALCIUM 8.4 07/02/2023    EGFR 67 07/02/2023    and CBC:   Lab Results   Component Value Date    HGB 7.0 (L) 07/02/2023    HCT 21.7 (L) 07/02/2023       Imaging Results: I have personally reviewed pertinent reports.     Chest Xray(s): N/A   FAST exam(s): N/A   CT Scan(s): positive for acute findings: left thigh hematoma   Additional Xray(s): N/A     Other Studies: n/a

## 2023-07-02 NOTE — PLAN OF CARE
Problem: OCCUPATIONAL THERAPY ADULT  Goal: Performs self-care activities at highest level of function for planned discharge setting. See evaluation for individualized goals. Description: Treatment Interventions: ADL retraining, Functional transfer training, UE strengthening/ROM, Endurance training, Cognitive reorientation, Patient/family training, Equipment evaluation/education, Compensatory technique education, Activityengagement          See flowsheet documentation for full assessment, interventions and recommendations. Note: Limitation: Decreased ADL status, Decreased UE ROM, Decreased Safe judgement during ADL, Decreased cognition, Decreased endurance, Decreased self-care trans, Decreased high-level ADLs  Prognosis: Good, Fair  Assessment: Pt is a 70 y.o. female who was admitted to [address] [place] [address] on 6/30/2023 with Leg hematoma, left, initial encounter - reports waking to go to BR and mistaking front door for BR door falling down front steps . Pt's problem list also includes PMH of HTN, underlying neurological disorder and CVA with L hemiplegia, CHF, GERD SHAR, bipolor depression, RA. At baseline pt was completing basic adls and mobility independently however admits to multiple falls - family manages iadls. Pt lives with family in 2 story home with [address] [place] [address]. Currently pt requires mod to max assist for overall ADLS and min to mod a x 2  for functional mobility/transfers. Pt currently presents with impairments in the following categories -steps to enter environment, difficulty performing ADLS, difficulty performing IADLS , limited insight into deficits, compliance, flat affect, decreased initiation and engagement , health management  and environment activity tolerance, endurance, standing balance/tolerance, sitting balance/tolerance, UE ROM, FMC, arousal, memory, insight, safety , judgement , attention , sequencing , sensation , task initiation  and communication.  These impairments, as well as pt's fatigue, pain, abnormal tone, (L) hemiplegia, decreased caregiver support, risk for falls and home environment  limit pt's ability to safely engage in all baseline areas of occupation, includingeating, grooming, bathing, dressing, toileting, functional mobility/transfers, community mobility, laundry , house maintenance, meal prep, cleaning, social participation  and leisure activities  From OT standpoint, recommend inpt rehab upon D/C. OT will continue to follow to address the below stated goals.      OT Discharge Recommendation: Post acute rehabilitation services

## 2023-07-02 NOTE — ASSESSMENT & PLAN NOTE
- Holding home Eliquis but did not attempt reversal due to recent stroke  - Trend H/H, currently downtrending will continue close monitoring  - Abdominal binder discontinued  - Hgb 6.8 on 7/2, 1 u pRBC ordered for transfusion

## 2023-07-03 DIAGNOSIS — I72.9 ANEURYSM (HCC): Primary | ICD-10-CM

## 2023-07-03 LAB
ABO GROUP BLD BPU: NORMAL
ANION GAP SERPL CALCULATED.3IONS-SCNC: 4 MMOL/L
ATRIAL RATE: 114 BPM
ATRIAL RATE: 115 BPM
ATRIAL RATE: 120 BPM
BPU ID: NORMAL
BUN SERPL-MCNC: 9 MG/DL (ref 5–25)
CALCIUM SERPL-MCNC: 9 MG/DL (ref 8.3–10.1)
CHLORIDE SERPL-SCNC: 114 MMOL/L (ref 96–108)
CO2 SERPL-SCNC: 26 MMOL/L (ref 21–32)
CREAT SERPL-MCNC: 0.81 MG/DL (ref 0.6–1.3)
CROSSMATCH: NORMAL
ERYTHROCYTE [DISTWIDTH] IN BLOOD BY AUTOMATED COUNT: 15.4 % (ref 11.6–15.1)
GFR SERPL CREATININE-BSD FRML MDRD: 74 ML/MIN/1.73SQ M
GLUCOSE SERPL-MCNC: 70 MG/DL (ref 65–140)
HCT VFR BLD AUTO: 28.2 % (ref 34.8–46.1)
HCT VFR BLD AUTO: 30.4 % (ref 34.8–46.1)
HGB BLD-MCNC: 9 G/DL (ref 11.5–15.4)
HGB BLD-MCNC: 9.5 G/DL (ref 11.5–15.4)
MCH RBC QN AUTO: 29.2 PG (ref 26.8–34.3)
MCHC RBC AUTO-ENTMCNC: 31.3 G/DL (ref 31.4–37.4)
MCV RBC AUTO: 94 FL (ref 82–98)
PLATELET # BLD AUTO: 198 THOUSANDS/UL (ref 149–390)
PMV BLD AUTO: 11.1 FL (ref 8.9–12.7)
POTASSIUM SERPL-SCNC: 3.6 MMOL/L (ref 3.5–5.3)
PR INTERVAL: 200 MS
PR INTERVAL: 256 MS
QRS AXIS: 116 DEGREES
QRS AXIS: 119 DEGREES
QRS AXIS: 67 DEGREES
QRSD INTERVAL: 102 MS
QRSD INTERVAL: 104 MS
QRSD INTERVAL: 94 MS
QT INTERVAL: 290 MS
QT INTERVAL: 290 MS
QT INTERVAL: 350 MS
QTC INTERVAL: 399 MS
QTC INTERVAL: 401 MS
QTC INTERVAL: 494 MS
RBC # BLD AUTO: 3.25 MILLION/UL (ref 3.81–5.12)
SODIUM SERPL-SCNC: 144 MMOL/L (ref 135–147)
T WAVE AXIS: 147 DEGREES
T WAVE AXIS: 165 DEGREES
T WAVE AXIS: 176 DEGREES
UNIT DISPENSE STATUS: NORMAL
UNIT PRODUCT CODE: NORMAL
UNIT PRODUCT VOLUME: 350 ML
UNIT RH: NORMAL
VENTRICULAR RATE: 114 BPM
VENTRICULAR RATE: 115 BPM
VENTRICULAR RATE: 120 BPM
WBC # BLD AUTO: 4.71 THOUSAND/UL (ref 4.31–10.16)

## 2023-07-03 PROCEDURE — 85027 COMPLETE CBC AUTOMATED: CPT

## 2023-07-03 PROCEDURE — 99232 SBSQ HOSP IP/OBS MODERATE 35: CPT | Performed by: SURGERY

## 2023-07-03 PROCEDURE — 99223 1ST HOSP IP/OBS HIGH 75: CPT | Performed by: INTERNAL MEDICINE

## 2023-07-03 PROCEDURE — 92526 ORAL FUNCTION THERAPY: CPT

## 2023-07-03 PROCEDURE — 93010 ELECTROCARDIOGRAM REPORT: CPT | Performed by: INTERNAL MEDICINE

## 2023-07-03 PROCEDURE — 85018 HEMOGLOBIN: CPT | Performed by: SURGERY

## 2023-07-03 PROCEDURE — 93005 ELECTROCARDIOGRAM TRACING: CPT

## 2023-07-03 PROCEDURE — 85014 HEMATOCRIT: CPT | Performed by: SURGERY

## 2023-07-03 PROCEDURE — 80048 BASIC METABOLIC PNL TOTAL CA: CPT

## 2023-07-03 RX ORDER — SODIUM CHLORIDE, SODIUM GLUCONATE, SODIUM ACETATE, POTASSIUM CHLORIDE, MAGNESIUM CHLORIDE, SODIUM PHOSPHATE, DIBASIC, AND POTASSIUM PHOSPHATE .53; .5; .37; .037; .03; .012; .00082 G/100ML; G/100ML; G/100ML; G/100ML; G/100ML; G/100ML; G/100ML
100 INJECTION, SOLUTION INTRAVENOUS CONTINUOUS
Status: DISCONTINUED | OUTPATIENT
Start: 2023-07-03 | End: 2023-07-05

## 2023-07-03 RX ORDER — LANOLIN ALCOHOL/MO/W.PET/CERES
3 CREAM (GRAM) TOPICAL
Status: DISCONTINUED | OUTPATIENT
Start: 2023-07-03 | End: 2023-07-07 | Stop reason: HOSPADM

## 2023-07-03 RX ADMIN — OXYCODONE HYDROCHLORIDE 5 MG: 5 TABLET ORAL at 16:51

## 2023-07-03 RX ADMIN — PRAZOSIN HYDROCHLORIDE 1 MG: 1 CAPSULE ORAL at 21:40

## 2023-07-03 RX ADMIN — APIXABAN 5 MG: 5 TABLET, FILM COATED ORAL at 09:55

## 2023-07-03 RX ADMIN — CINACALCET 30 MG: 30 TABLET, FILM COATED ORAL at 09:54

## 2023-07-03 RX ADMIN — DULOXETINE HYDROCHLORIDE 60 MG: 60 CAPSULE, DELAYED RELEASE ORAL at 09:54

## 2023-07-03 RX ADMIN — LAMOTRIGINE 150 MG: 100 TABLET ORAL at 21:40

## 2023-07-03 RX ADMIN — ACETAMINOPHEN 650 MG: 325 TABLET, FILM COATED ORAL at 01:26

## 2023-07-03 RX ADMIN — SODIUM CHLORIDE, SODIUM GLUCONATE, SODIUM ACETATE, POTASSIUM CHLORIDE, MAGNESIUM CHLORIDE, SODIUM PHOSPHATE, DIBASIC, AND POTASSIUM PHOSPHATE 100 ML/HR: .53; .5; .37; .037; .03; .012; .00082 INJECTION, SOLUTION INTRAVENOUS at 22:48

## 2023-07-03 RX ADMIN — OXYCODONE HYDROCHLORIDE 5 MG: 5 TABLET ORAL at 21:40

## 2023-07-03 RX ADMIN — APIXABAN 5 MG: 5 TABLET, FILM COATED ORAL at 17:02

## 2023-07-03 RX ADMIN — DULOXETINE HYDROCHLORIDE 60 MG: 60 CAPSULE, DELAYED RELEASE ORAL at 17:02

## 2023-07-03 RX ADMIN — HYDROXYCHLOROQUINE SULFATE 200 MG: 200 TABLET ORAL at 09:57

## 2023-07-03 RX ADMIN — ACETAMINOPHEN 650 MG: 325 TABLET, FILM COATED ORAL at 15:11

## 2023-07-03 RX ADMIN — BUPROPION HYDROCHLORIDE 100 MG: 100 TABLET, FILM COATED ORAL at 17:02

## 2023-07-03 RX ADMIN — ONDANSETRON 4 MG: 2 INJECTION INTRAMUSCULAR; INTRAVENOUS at 15:19

## 2023-07-03 RX ADMIN — MELATONIN TAB 3 MG 3 MG: 3 TAB at 21:36

## 2023-07-03 RX ADMIN — METOPROLOL SUCCINATE 25 MG: 25 TABLET, EXTENDED RELEASE ORAL at 09:57

## 2023-07-03 RX ADMIN — BUPROPION HYDROCHLORIDE 100 MG: 100 TABLET, FILM COATED ORAL at 09:55

## 2023-07-03 RX ADMIN — PANTOPRAZOLE SODIUM 40 MG: 40 TABLET, DELAYED RELEASE ORAL at 05:23

## 2023-07-03 RX ADMIN — HYDROXYCHLOROQUINE SULFATE 200 MG: 200 TABLET ORAL at 16:52

## 2023-07-03 RX ADMIN — LORAZEPAM 0.25 MG: 0.5 TABLET ORAL at 21:37

## 2023-07-03 RX ADMIN — SODIUM CHLORIDE, SODIUM GLUCONATE, SODIUM ACETATE, POTASSIUM CHLORIDE, MAGNESIUM CHLORIDE, SODIUM PHOSPHATE, DIBASIC, AND POTASSIUM PHOSPHATE 100 ML/HR: .53; .5; .37; .037; .03; .012; .00082 INJECTION, SOLUTION INTRAVENOUS at 12:33

## 2023-07-03 RX ADMIN — SPIRONOLACTONE 25 MG: 25 TABLET ORAL at 09:54

## 2023-07-03 NOTE — ASSESSMENT & PLAN NOTE
- home Eliquis held on admission but did not attempt reversal due to recent stroke  - Trend H/H  - binder discontinued  - Hgb 6.8 on 7/2, 1 u pRBC ordered for transfusion  - hgb 7/3 was 9.5

## 2023-07-03 NOTE — MALNUTRITION/BMI
This medical record reflects one or more clinical indicators suggestive of malnutrition and/or morbid obesity. Malnutrition Findings:   Adult Malnutrition type: Chronic illness  Adult Degree of Malnutrition: Other severe protein calorie malnutrition  Malnutrition Characteristics: Inadequate energy, Weight loss, Muscle loss, Fat loss                  360 Statement: severe protein energy malnutrition is related to physiological causes as evidenced by significant weight loss (28#/18.7% weight loss over the last ~5 months), inadequate energy intakes and mild muscle/fat loss. Treated with addition of supplements. BMI Findings: Body mass index is 21.61 kg/m². See Nutrition note dated 7/3/23 for additional details. Completed nutrition assessment is viewable in the nutrition documentation.

## 2023-07-03 NOTE — [facility] ADMISSION
Referral was received for consideration of patient for inpatient acute rehab at [facility].  After review with [facility] physician patient is accepted for [facility] when medically stable and pending bed availability

## 2023-07-03 NOTE — CONSULTS
Consultation - [facility] [address] 70 y.o. female MRN: [id number]  [facility] Encounter: [id number]      Assessment/Plan     Ambulatory dysfunction with fall  -reportedly mechanical fall at home on 6/30  -(-) head strike (-) loss of consciousness  -in setting of daily eliquis use for CVA (2/2023)  -injuries as outlined below  -Requires use of walker/wheelcvhair for ambulation at baseline  -hx recurrent falls, endorses at least one additional in past six months   -remains high risk future falls due to age, hx fall, deconditioning/debility and unfamiliar environment   -encourage good body mechanics and assist with all transfers  -keep personal items and call bell close to prevent reaching  -maintain environment free of fall hazards  -encourage appropriate footwear and adequate lighting at all times when out of bed  -recommend home fall risk assessment and personal fall alert system if returning home  -PT and OT following     Left hip hematoma  -s/p fall as outlined above  -noted on CT chest/abd/pelvis on admission, findings reportedly concerning for active extravasation   -in setting of chronic daily eliquis use for hx embolic CVA  -with acute blood loss anemia requiring PRBC earlier in admission now appears to be remaining stable  -Neurovascular checks per protocol  -Continue acute pain control    Acute blood loss anemia  -evidenced by >2g drop in Hb from 10.7 to 6.8  -required PRBC transfusion earlier in admission   -Appears to be remaining stable at 9.5 today from 8.5 yest  -Monitor for ongoing acute blood loss and transfuse as indicated     Acute pain due to trauma   -consider pain control per Geriatric pain protocol with adjustments to ensure adequate coverage given chronic daily morphine use for chronic msk pain syndrome   Tylenol 975mg Q8H scheduled  Roxicodone 2.5mg Q4H PRN moderate pain  Roxicodone 5mg Q4H PRN severe pain  -consider adjuncts such as lidocaine patch topically to appropriate areas   -encourage addition of non-pharmacologic pain treatment including ice and frequent repositioning  -recommend  bowel regimen to prevent and treat constipation due to increased risk with acute pain and opiate pain medications    Dysphagia  -requiring modified diet, speech on consult, appreciate recs  -pt would enjoy vanilla boost and vanilla or strawberry boost as supplementsif allowed on current diet consistency  -continue aspiration precaution    Cognitive screening   -alert and oriented but does endorse short term memory trouble concerning for mild cog impairment  -at baseline oriented but forgetful, pt reports symptoms markedly worse since recurrent CVA in 2/2023  -requires assist with ADLs and iADls due to both physical and cognitive deficits at baseline   -short version mini-cog 9/15 (2021), no repeat on record, consider repeating following recovery from acute injuries   -[address] 6/30 personally viewed, in addition to diffuse chronic microangiopathic changes large area encephalomalacia noted R MCA distribution consistent with prior CVA  -TSH 2.171, no recent B12, consider checking with routine labs   -encourage use of sensory assist devices such as corrective lenses at all appropriate times to reduce risk since impairment further contributing to isolation, confusion, encephalopathy and more precipitous cog decline  -Encouraged to remain physically, socially, cognitively active and engaged to maintain cognitive acuity    Hx CVA  -with residual left sided weakness, dysphagia and cognitive impairments  -continue secondary risk factor modifications   -continue o/p f/u with Neurology     Rheumatoid arthritis   -Maintained on Plaquenil chronically as outpatient  -maintained on oral morphine chronically as outpatient for treatment of chronic back and joint pain related to underlying rheumatoid arthritis  -Continue close outpatient follow-up with Pain Management and Rheumatology for ongoing management    Chronic opiate dependence  -maintained on morphine chronically as above as confirmed with PDMP  -continue close o/p f/u with pain management    Impaired Vision  -recommend use of corrective lenses at all appropriate times  -encourage adequate lighting and encourage use of assistance with ambulation  -keep personal belongings close to person to avoid reaching  -encourage appropriate footwear at all times    Bipolar disorder  -continued on home Lamictal, Cymbalta and Wellbutrin regimen  -may benefit from referral to outpatient support group for stroke survivors as may significantly impact both physical and mental health   -consider weaning off Gabapentin to minimize duplication of therapy as is on Cymbalta and to minimize polypharmacy   -does not appear to be on lorazepam as o/p since 1/2023, would not recommend resuming at this time if not actively taking as may increase risk confusion, falls, and oversedation in frail elderly individual   -continue close o/p with Psychiatry for ongoing management     Impaired Vision  -recommend use of corrective lenses at all appropriate times  -encourage adequate lighting and encourage use of assistance with ambulation  -keep personal belongings close to person to avoid reaching  -encourage appropriate footwear at all times  -Consider large font for printed materials provided to patient    Deconditioning/debility/frailty   -Clinical frailty scale stage VI, moderately frail  -Multifactorial including age, left hemiparesis due to recent CVA, and multiple chronic medical co-morbidities now with ambulatory dysfunction and fall in elderly individual with very limited physiologic and metabolic reserve  -continue to encourage well-balanced nutrition and recommend nutritional supplements such as boost and magic cup to augment mealtime intake  -continue optimization chronic conditions and address acute derangements as arise   -continue psychosocial supports of patient and family     Delirium precautions  -Patient is high risk of delirium due to age, fall, traumatic injuries, hospitalization in elderly individual with some underlying baseline cognitive impairment   -Initiate delirium precautions  -maintain normal sleep/wake cycle  -minimize overnight interruptions, group overnight vitals/labs/nursing checks as possible  -dim lights, close blinds and turn off tv to minimize stimulation and encourage sleep environment in evenings  -ensure that pain is well controlled  -monitor for fecal and urinary retention which may precipitate delirium  -encourage early mobilization and ambulation with assist as cleared to safely do so  -provide frequent reorientation and redirection as indicated and appropriate   -encourage family and friends at the bedside to help help calm patient if anxious  -minimize use of medications which may precipitate or worsen delirium such as tramadol, benzodiazepine, anticholinergics, and benadryl whenever poss   -encourage hydration and nutrition   -redirect unwanted behaviors as first line    Home medication review  [facility] [phone]:    Cholestyramine 4g packet dissolved in liquid with meals   Proventil HFA 90 mcg/ACT 2 puffs Q6H PRN  Eliquis 5 Mg twice daily  Wellbutrin 100 Mg twice daily  Sensipar 30 Mg daily with breakfast  Cymbalta 60 Mg twice daily  Gabapentin 400 Mg twice daily  Plaquenil 200 Mg BID  Lamotrigine 150 Mg at bedtime  Magnesium oxide 800 Mg twice daily  Melatonin 3 Mg at bedtime  Metoprolol succinate 25 Mg daily  Morphine 7.5 Mg twice daily as needed (last filled 6/23/23 per PDMP)  Zofran 4 Mg every 8H PRN  Protonix 40 Mg daily  Prazosin 1 Mg at bedtime  Spironolactone 25 Mg daily    Lorazepam does not appear to be active - last filled for 30 days 1/23     Care coordination: rounded with Antonella Guerrero (RN)    History of Present Illness   Physician Requesting Consult: Kinjal Castillo MD  Reason for Consult / Principal Problem: Fall  Hx and PE limited by: N/A  Additional history obtained from: Chart review and patient evaluation, outside records including [facility]    HPI: Martina Carson is a 70y.o. year old female with bipolar disorder, nonischemic cardiomyopathy, chronic pain syndrome, GERD, hypertension, mixed stress and urge urinary incontinence, SHAR, peripheral neuropathy, polypharmacy, rheumatoid arthritis, severe protein malnutrition, history of CVA, anxiety disorder, osteoporosis, and ambulatory dysfunction who is admitted to the trauma service following reportedly mechanical fall at home found to have large left thigh hematoma on admission imaging, she is being seen in consultation by Geriatrics for high risk developing delirium during hospitalization. Osvaldo Mackenzie is seen and examined at bedside where she is lying resting, she explains that she sustained a mechanical fall at home on 6/30/23 when she woke up and was initially disoriented and opened the door to the porch thinking it was the bathroom. When she went to step in she tripped on the step and fell striking her left side resulting in severe left hip and leg pain and inability to get up. She denied head strike or loss of consciousness but was unable to get up without assistance and remained on the ground until found by family. She notes continued severe pain in her left hip since the fall. She reports that her home morphine regimen has been helpful to alleviate some of the pain. Prior to admission Osvaldo Mackenzie was residing home with family. She ambulates with use of walker and reports history of recurrent falls increasing in frequency since recent stroke. She reports requiring assistance with both ADLs and iADLs since her stroke and notes that she has issues with balance and intermittent confusion since that time. She reports use of glasses and dental bite guard at night but no hearing aid or denture.      Inpatient consult to Gerontology  Consult performed by: Nicolle Mays DO  Consult ordered by: Ros Angel MD        Review of Systems   Constitutional: Positive for appetite change (poor ). Negative for chills and fever. HENT: Positive for trouble swallowing (requires thickened diet ). Negative for dental problem and hearing loss. Eyes: Positive for visual disturbance (wears glasses). Respiratory: Negative. Negative for shortness of breath. Cardiovascular: Negative. Gastrointestinal: Negative. Genitourinary: Negative. Musculoskeletal: Positive for gait problem. Left hip/thigh pain since falling at home   Skin: Negative. Neurological: Positive for dizziness. Negative for light-headedness and headaches. Imbalance     L sided weakness since recent CVA   Hematological: Bruises/bleeds easily (due to eliquis ). Psychiatric/Behavioral: Positive for confusion (intermittent since recent CVA). All other systems reviewed and are negative.     Historical Information   Past Medical History:   Diagnosis Date   • Anxiety    • Asthma    • Bipolar depression ([address])    • Chronic narcotic dependence ([address])    • Chronic pain    • Depression    • Enterovirus heart infection    • SHAR (obstructive sleep apnea)    • Osteoarthritis    • Peripheral neuropathy    • Plantar fasciitis of right foot    • Senile osteoporosis    • Seronegative arthropathy of multiple sites ([address])    • Undifferentiated connective tissue disease ([address])      Past Surgical History:   Procedure Laterality Date   • BREAST IMPLANT     • FIXATION KYPHOPLASTY LUMBAR SPINE     • HYSTERECTOMY     • INCONTINENCE SURGERY N/A    • IR STROKE ALERT  2/12/2023   • NASAL SEPTOPLASTY W/ TURBINOPLASTY N/A    • RECTAL PROLAPSE REPAIR N/A    • REPAIR RECTOCELE     • RHINOPLASTY N/A      Social History   Social History     Substance and Sexual Activity   Alcohol Use Not Currently    Comment: quit     Social History     Substance and Sexual Activity   Drug Use Not Currently   • Types: Marijuana    Comment: has medical card Social History     Tobacco Use   Smoking Status Former   Smokeless Tobacco Never     Family History:   Family History   Problem Relation Age of Onset   • No Known Problems Mother    • No Known Problems Father    • Arthritis Family    • Diabetes Family    • Stroke Family      Meds/Allergies   all current active meds have been reviewed    Allergies   Allergen Reactions   • Cephalosporins Other (See Comments)     rash   • Dye [Iodinated Contrast Media] Anaphylaxis     IVP dye   • Iodine - Food Allergy Anaphylaxis     Anaphylaxis     • Methocarbamol Hives   • Sulfa Antibiotics Hives   • Bactrim [Sulfamethoxazole-Trimethoprim] Hives   • Erythromycin Hives   • Penicillins    • Shrimp Extract Allergy Skin Test - Food Allergy Other (See Comments)   • Trimethoprim Hives     Objective     Intake/Output Summary (Last 24 hours) at 7/3/2023 0710  Last data filed at 7/2/2023 1648  Gross per 24 hour   Intake 350 ml   Output --   Net 350 ml     Invasive Devices     Peripheral Intravenous Line  Duration           Long-Dwell Peripheral IV (Midline) 07/01/23 Left Brachial 1 day              Physical Exam  Vitals and nursing note reviewed. Constitutional:       General: She is not in acute distress. Comments: Thin frail chronically ill appearing elderly female    HENT:      Head: Normocephalic. Nose: Nose normal.      Mouth/Throat:      Mouth: Mucous membranes are dry. Comments: Dentition appears mostly in tact, membranes exceedingly dry with lip cracked and peeling  Eyes:      General: No scleral icterus. Right eye: No discharge. Left eye: No discharge. Conjunctiva/sclera: Conjunctivae normal.   Cardiovascular:      Rate and Rhythm: Tachycardia present. Pulses: Normal pulses. Pulmonary:      Effort: Pulmonary effort is normal. No respiratory distress. Comments: Saturating well on room air   Abdominal:      General: There is no distension. Palpations: Abdomen is soft. Tenderness: There is no abdominal tenderness. Musculoskeletal:      Cervical back: Neck supple. Right lower leg: No edema. Left lower leg: No edema. Comments: Diffuse severe subcutaneous fat and muscle wasting        Skin:     General: Skin is warm and dry. Comments: Thin and friable     Large hematoma dorsum of L upper arm and L lateral hip/thigh   Neurological:      Mental Status: She is alert. Comments: Awake and alert answering ques appropriately     Speech slow and dysarthric, difficult to understand at time     Tremulous    Psychiatric:      Comments: Pleasant and cooperative          Lab Results:     I have personally reviewed pertinent lab results. I have personally reviewed the pertinent imaging study reports in PACS.      Therapies:   PT: following   OT: following     VTE Prophylaxis: Sequential compression device (Venodyne)     Code Status: Level 1 - Full Code  Advance Directive and Living Will:      Power of :    POLST:      Family and Social Support: son and daughter     Goals of Care: pain control

## 2023-07-03 NOTE — PROGRESS NOTES
[address] [place] [address]  Progress Note  Name: Micheal Veras  MRN: [id number]  [facility] Date of Admission: 6/30/2023   Date of Service: 7/3/2023 I Hospital Day: 2    Assessment/Plan   Dysphagia  Assessment & Plan  - Chronic dysphagia  - speech consult placed- dysphagia 1 diet    Hypercalcemia  Assessment & Plan  - Continue home cinacalcet     Stroke [facility])  Assessment & Plan  - Recent stroke February 2023  - eliquis resumed 7/3 with stable hgb    Bipolar depression ([address])  Assessment & Plan  - Continue home wellbutrin, cymbalta     CHF (congestive heart failure) ([address])  Assessment & Plan  Wt Readings from Last 3 Encounters:   06/30/23 55.3 kg (122 lb)   06/06/23 47.9 kg (105 lb 9.6 oz)   05/01/23 56.8 kg (125 lb 3.2 oz)       - continue home metoprolol and aldactone      * Leg hematoma, left, initial encounter  Assessment & Plan  - home Eliquis held on admission but did not attempt reversal due to recent stroke  - Trend H/H  - binder discontinued  - Hgb 6.8 on 7/2, 1 u pRBC ordered for transfusion  - hgb 7/3 was 9.5             Bowel Regimen: senna  VTE Prophylaxis: eliquis    Disposition: rehab    Subjective   Subjective: no complaints today. Patient more awake and alert than when I saw her yesterday. hgb responded well to blood transfusion.      Objective   Vitals:   Temp:  [97.1 °F (36.2 °C)-98.9 °F (37.2 °C)] 97.9 °F (36.6 °C)  HR:  [] 125  Resp:  [14-20] 18  BP: ()/(50-71) 111/68    I/O       07/01 0701  07/02 0700 07/02 0701  07/03 0700 07/03 0701 07/04 0700    I.V. (mL/kg) 415 (7.5)      Blood  350     Total Intake(mL/kg) 415 (7.5) 350 (6.3)     Urine (mL/kg/hr) 100 (0.1)      Stool 0      Total Output 100      Net +315 +350            Unmeasured Urine Occurrence 1 x 2 x     Unmeasured Stool Occurrence 2 x 2 x            Physical Exam:   General: awake and alert  CV: RRR  Pulm: CTAB  Abd: soft and non-tender  Neuro: follows commands in all extremities  Skin: no new rashes    Invasive Devices     Peripheral Intravenous Line  Duration           Long-Dwell Peripheral IV (Midline) 07/01/23 Left Brachial 1 day                      Lab Results:   Results: I have personally reviewed all pertinent laboratory/tests results, BMP/CMP:   Lab Results   Component Value Date    SODIUM 144 07/03/2023    K 3.6 07/03/2023     (H) 07/03/2023    CO2 26 07/03/2023    BUN 9 07/03/2023    CREATININE 0.81 07/03/2023    CALCIUM 9.0 07/03/2023    EGFR 74 07/03/2023    and CBC:   Lab Results   Component Value Date    WBC 4.71 07/03/2023    HGB 9.5 (L) 07/03/2023    HCT 30.4 (L) 07/03/2023    MCV 94 07/03/2023     07/03/2023    RBC 3.25 (L) 07/03/2023    MCH 29.2 07/03/2023    MCHC 31.3 (L) 07/03/2023    RDW 15.4 (H) 07/03/2023    MPV 11.1 07/03/2023

## 2023-07-03 NOTE — SPEECH THERAPY NOTE
Speech Language/Pathology    Speech/Language Pathology Progress Note    Patient Name: Candy Hernandez  RCEOV'V Date: 7/3/2023     Problem List  Principal Problem:    Leg hematoma, left, initial encounter  Active Problems:    CHF (congestive heart failure) ([address])    Bipolar depression ([address])    Stroke ([address])    Hypercalcemia    Dysphagia       Past Medical History  Past Medical History:   Diagnosis Date   • Anxiety    • Asthma    • Bipolar depression ([address])    • Chronic narcotic dependence ([address])    • Chronic pain    • Depression    • Enterovirus heart infection    • SHAR (obstructive sleep apnea)    • Osteoarthritis    • Peripheral neuropathy    • Plantar fasciitis of right foot    • Senile osteoporosis    • Seronegative arthropathy of multiple sites ([address])    • Undifferentiated connective tissue disease ([address])         Past Surgical History  Past Surgical History:   Procedure Laterality Date   • BREAST IMPLANT     • FIXATION KYPHOPLASTY LUMBAR SPINE     • HYSTERECTOMY     • INCONTINENCE SURGERY N/A    • IR STROKE ALERT  2/12/2023   • NASAL SEPTOPLASTY W/ TURBINOPLASTY N/A    • RECTAL PROLAPSE REPAIR N/A    • REPAIR RECTOCELE     • RHINOPLASTY N/A      Subjective:  "Hi Citlalli. I've been gagging and vomiting at home" Patient awake and alert. Objective:  RN requested follow up visit to assess swallow function due to frequent gagging. Patient is well known to  from previous admissions. She continues with some dysarthria and hypernasality, which is her baseline. The patient's vocal quality is not wet or gurgly, compared to previous admissions. The patient reports dislike of puree and nectar thick liquids. She also reports burning when swallowing on tongue and in throat. (question thrush) She refuses hot puree solids. She is trialed with ice cream and thin liquids. Oral care is provided before trials. (The patient has been doing free water protocol at home).  The patient then takes small, single sips of thin liquids and small tsp ice cream. Suspect swallow initiation time is delayed, with audible and multiple swallows. Despite this, no coughing or additional s/s aspiration observed. PO intake is minimal. Patient is also c/o gagging. ? Esophageal component. She reports recently seeing GI, but no records found in chart. Assessment:  Patient tolerated small sips of thin liquids and ice cream with audible swallows, but no overt s/s aspiration. Plan/Recommendations:  Continue puree diet with nectar thick liquids. Repeat VBS later this week.  Continue ST.

## 2023-07-03 NOTE — CASE MANAGEMENT
Case Management Discharge Planning Note    Patient name Ayaka Park  Location [address] [place] [address] [address] 080-95 MRN [id number]  : 1955 Date 7/3/2023       Current Admission Date: 2023  Current Admission Diagnosis:Leg hematoma, left, initial encounter   Patient Active Problem List    Diagnosis Date Noted   • Leg hematoma, left, initial encounter 2023   • Cerebral aneurysm, nonruptured 05/15/2023   • Nocturnal hypoxia 2023   • Stroke-like symptoms 2023   • Insomnia 2023   • Hypomagnesemia 2023   • Sinus tachycardia 2023   • Hypokalemia 03/10/2023   • AMS (altered mental status) 2023   • Gout of foot 2023   • Dysphagia 2023   • Wound of left ankle 2023   • Obstructive sleep apnea 2023   • History of stroke 2023   • History of bacteremia 2023   • Acute CVA (cerebrovascular accident) ([address]) 2023   • Hypercalcemia 2023   • Discoloration of skin of foot 2023   • Abdominal pain 2023   • HFrEF (heart failure with reduced ejection fraction) ([address]) 2023   • Stroke ([address]) 2023   • HTN (hypertension) 2023   • Cardiomyopathy ([address]) 2023   • Vitamin D insufficiency 2023   • LAILA (acute kidney injury) ([address]) 2022   • Polypharmacy 2022   • 'light-for-dates' infant with signs of fetal malnutrition 2022   • Bipolar depression ([address]) 2022   • Rheumatoid arthritis of multiple sites with negative rheumatoid factor ([address]) 2022   • Closed fracture of multiple ribs of right side 2022   • Traumatic pneumothorax 2022   • Closed fracture of transverse process of lumbar vertebra ([address]) 2022   • Liver contusion 2022   • Fall 2022   • Acute pain due to trauma 2022   • Right shoulder pain 2022   • Severe protein-calorie malnutrition ([address]) 2022   • Undifferentiated connective tissue disease ([address]) 2021   • Primary generalized (osteo)arthritis 12/29/2021   • Peripheral neuropathy 12/29/2021   • Osteoporosis 12/29/2021   • Hx of compression fracture of spine 12/29/2021   • Lumbar spondylosis 12/29/2021   • Cervical spondylosis 12/29/2021   • Anemia 06/26/2021   • Chronic pain syndrome    • Mild intermittent asthma without complication    • Ambulatory dysfunction    • Seronegative arthropathy of multiple sites (HCC)    • Gastroesophageal reflux disease without esophagitis    • Mixed stress and urge urinary incontinence    • CHF (congestive heart failure) ([facility])       LOS (days): 2  Geometric Mean LOS (GMLOS) (days): 3.80  Days to GMLOS:1.7     OBJECTIVE:  Risk of Unplanned Readmission Score: 39.14         Current admission status: Inpatient   Preferred Pharmacy:   [address] [place] [id number]  Phone: [phone] Fax: [phone] [facility], [address] [place] [address]  [address] [place] [address]  Phone: [phone] Fax: [phone]    Primary Care Provider: Mehul Rodas DO    Primary Insurance: MEDICARE  [facility]    DISCHARGE DETAILS:    Pt clinically accepted to [facility]. CM reserved their services. Plan for d/c likely tomorrow.

## 2023-07-03 NOTE — PLAN OF CARE
Problem: MOBILITY - ADULT  Goal: Maintain or return to baseline ADL function  Description: INTERVENTIONS:  -  Assess patient's ability to carry out ADLs; assess patient's baseline for ADL function and identify physical deficits which impact ability to perform ADLs (bathing, care of mouth/teeth, toileting, grooming, dressing, etc.)  - Assess/evaluate cause of self-care deficits   - Assess range of motion  - Assess patient's mobility; develop plan if impaired  - Assess patient's need for assistive devices and provide as appropriate  - Encourage maximum independence but intervene and supervise when necessary  - Involve family in performance of ADLs  - Assess for home care needs following discharge   - Consider OT consult to assist with ADL evaluation and planning for discharge  - Provide patient education as appropriate  Outcome: Progressing  Goal: Maintains/Returns to pre admission functional level  Description: INTERVENTIONS:  - Perform BMAT or MOVE assessment daily.   - Set and communicate daily mobility goal to care team and patient/family/caregiver. - Collaborate with rehabilitation services on mobility goals if consulted  - Perform Range of Motion 3 times a day. - Reposition patient every 2 hours.   - Dangle patient 3 times a day  - Stand patient 3 times a day  - Ambulate patient 3 times a day  - Out of bed to chair 3 times a day   - Out of bed for meals 3 times a day  - Out of bed for toileting  - Record patient progress and toleration of activity level   Outcome: Progressing     Problem: PAIN - ADULT  Goal: Verbalizes/displays adequate comfort level or baseline comfort level  Description: Interventions:  - Encourage patient to monitor pain and request assistance  - Assess pain using appropriate pain scale  - Administer analgesics based on type and severity of pain and evaluate response  - Implement non-pharmacological measures as appropriate and evaluate response  - Consider cultural and social influences on pain and pain management  - Notify physician/advanced practitioner if interventions unsuccessful or patient reports new pain  Outcome: Progressing     Problem: INFECTION - ADULT  Goal: Absence or prevention of progression during hospitalization  Description: INTERVENTIONS:  - Assess and monitor for signs and symptoms of infection  - Monitor lab/diagnostic results  - Monitor all insertion sites, i.e. indwelling lines, tubes, and drains  - Monitor endotracheal if appropriate and nasal secretions for changes in amount and color  - [place] appropriate cooling/warming therapies per order  - Administer medications as ordered  - Instruct and encourage patient and family to use good hand hygiene technique  - Identify and instruct in appropriate isolation precautions for identified infection/condition  Outcome: Progressing  Goal: Absence of fever/infection during neutropenic period  Description: INTERVENTIONS:  - Monitor WBC    Outcome: Progressing     Problem: SAFETY ADULT  Goal: Maintain or return to baseline ADL function  Description: INTERVENTIONS:  -  Assess patient's ability to carry out ADLs; assess patient's baseline for ADL function and identify physical deficits which impact ability to perform ADLs (bathing, care of mouth/teeth, toileting, grooming, dressing, etc.)  - Assess/evaluate cause of self-care deficits   - Assess range of motion  - Assess patient's mobility; develop plan if impaired  - Assess patient's need for assistive devices and provide as appropriate  - Encourage maximum independence but intervene and supervise when necessary  - Involve family in performance of ADLs  - Assess for home care needs following discharge   - Consider OT consult to assist with ADL evaluation and planning for discharge  - Provide patient education as appropriate  Outcome: Progressing  Goal: Maintains/Returns to pre admission functional level  Description: INTERVENTIONS:  - Perform BMAT or MOVE assessment daily.   - Set and communicate daily mobility goal to care team and patient/family/caregiver. - Collaborate with rehabilitation services on mobility goals if consulted  - Perform Range of Motion 3 times a day. - Reposition patient every 2 hours.   - Dangle patient 3 times a day  - Stand patient 3 times a day  - Ambulate patient 3 times a day  - Out of bed to chair 3 times a day   - Out of bed for meals 3 times a day  - Out of bed for toileting  - Record patient progress and toleration of activity level   Outcome: Progressing  Goal: Patient will remain free of falls  Description: INTERVENTIONS:  - Educate patient/family on patient safety including physical limitations  - Instruct patient to call for assistance with activity   - Consult OT/PT to assist with strengthening/mobility   - Keep Call bell within reach  - Keep bed low and locked with side rails adjusted as appropriate  - Keep care items and personal belongings within reach  - Initiate and maintain comfort rounds  - Make Fall Risk Sign visible to staff  - Offer Toileting every 2 Hours, in advance of need  - Initiate/Maintain bed alarm  - Obtain necessary fall risk management equipment:   - Apply yellow socks and bracelet for high fall risk patients  - Consider moving patient to room near nurses station  Outcome: Progressing     Problem: DISCHARGE PLANNING  Goal: Discharge to home or other facility with appropriate resources  Description: INTERVENTIONS:  - Identify barriers to discharge w/patient and caregiver  - Arrange for needed discharge resources and transportation as appropriate  - Identify discharge learning needs (meds, wound care, etc.)  - Arrange for interpretive services to assist at discharge as needed  - Refer to Case Management Department for coordinating discharge planning if the patient needs post-hospital services based on physician/advanced practitioner order or complex needs related to functional status, cognitive ability, or social support system  Outcome: Progressing     Problem: Knowledge Deficit  Goal: Patient/family/caregiver demonstrates understanding of disease process, treatment plan, medications, and discharge instructions  Description: Complete learning assessment and assess knowledge base. Interventions:  - Provide teaching at level of understanding  - Provide teaching via preferred learning methods  Outcome: Progressing     Problem: Nutrition/Hydration-ADULT  Goal: Nutrient/Hydration intake appropriate for improving, restoring or maintaining nutritional needs  Description: Monitor and assess patient's nutrition/hydration status for malnutrition. Collaborate with interdisciplinary team and initiate plan and interventions as ordered. Monitor patient's weight and dietary intake as ordered or per policy. Utilize nutrition screening tool and intervene as necessary. Determine patient's food preferences and provide high-protein, high-caloric foods as appropriate.      INTERVENTIONS:  - Monitor oral intake, urinary output, labs, and treatment plans  - Assess nutrition and hydration status and recommend course of action  - Evaluate amount of meals eaten  - Assist patient with eating if necessary   - Allow adequate time for meals  - Recommend/ encourage appropriate diets, oral nutritional supplements, and vitamin/mineral supplements  - Order, calculate, and assess calorie counts as needed  - Recommend, monitor, and adjust tube feedings and TPN/PPN based on assessed needs  - Assess need for intravenous fluids  - Provide specific nutrition/hydration education as appropriate  - Include patient/family/caregiver in decisions related to nutrition  Outcome: Progressing

## 2023-07-03 NOTE — CASE MANAGEMENT
Case Management Discharge Planning Note    Patient name Yaz Herrera  Location [address] [place] [address] [address] [id number] MRN [id number]  : 1955 Date 7/3/2023       Current Admission Date: 2023  Current Admission Diagnosis:Leg hematoma, left, initial encounter   Patient Active Problem List    Diagnosis Date Noted   • Leg hematoma, left, initial encounter 2023   • Cerebral aneurysm, nonruptured 05/15/2023   • Nocturnal hypoxia 2023   • Stroke-like symptoms 2023   • Insomnia 2023   • Hypomagnesemia 2023   • Sinus tachycardia 2023   • Hypokalemia 03/10/2023   • AMS (altered mental status) 2023   • Gout of foot 2023   • Dysphagia 2023   • Wound of left ankle 2023   • Obstructive sleep apnea 2023   • History of stroke 2023   • History of bacteremia 2023   • Acute CVA (cerebrovascular accident) ([address]) 2023   • Hypercalcemia 2023   • Discoloration of skin of foot 2023   • Abdominal pain 2023   • HFrEF (heart failure with reduced ejection fraction) ([address]) 2023   • Stroke ([address]) 2023   • HTN (hypertension) 2023   • Cardiomyopathy ([address]) 2023   • Vitamin D insufficiency 2023   • LAILA (acute kidney injury) ([address]) 2022   • Polypharmacy 2022   • 'light-for-dates' infant with signs of fetal malnutrition 2022   • Bipolar depression ([address]) 2022   • Rheumatoid arthritis of multiple sites with negative rheumatoid factor ([address]) 2022   • Closed fracture of multiple ribs of right side 2022   • Traumatic pneumothorax 2022   • Closed fracture of transverse process of lumbar vertebra ([address]) 2022   • Liver contusion 2022   • Fall 2022   • Acute pain due to trauma 2022   • Right shoulder pain 2022   • Severe protein-calorie malnutrition ([address]) 2022   • Undifferentiated connective tissue disease ([address]) 2021   • Primary generalized (osteo)arthritis 12/29/2021   • Peripheral neuropathy 12/29/2021   • Osteoporosis 12/29/2021   • Hx of compression fracture of spine 12/29/2021   • Lumbar spondylosis 12/29/2021   • Cervical spondylosis 12/29/2021   • Anemia 06/26/2021   • Chronic pain syndrome    • Mild intermittent asthma without complication    • Ambulatory dysfunction    • Seronegative arthropathy of multiple sites (HCC)    • Gastroesophageal reflux disease without esophagitis    • Mixed stress and urge urinary incontinence    • CHF (congestive heart failure) ([address] [facility])       LOS (days): 2  Geometric Mean LOS (GMLOS) (days):   Days to GMLOS:     OBJECTIVE:  Risk of Unplanned Readmission Score: 38.77         Current admission status: Inpatient   Preferred Pharmacy:   [address] [address]  Phone: [phone] Fax: [phone] [facility], [address] [place] [address]  [address] [place] [address]  Phone: [phone] Fax: [phone]    Primary Care Provider: Meghana Morin DO    Primary Insurance: MEDICARE  Secondary Insurance: AARP    DISCHARGE DETAILS:    CM spoke to pt and her dtr Erna regarding d/c planning. Pt was recommended for IP rehab. Both interested in IP rehab  Preference is [facility] as well as [address] [place] [address]. CM placed referrals and will follow up      CM reviewed d/c planning process including the following: identifying help at home, patient preference for d/c planning needs, Discharge Lounge, [facility] Meds to Bed program, availability of treatment team to discuss questions or concerns patient and/or family may have regarding understanding medications and recognizing signs and symptoms once discharged. CM also encouraged patient to follow up with all recommended appointments after discharge. Patient advised of importance for patient and family to participate in managing patient’s medical well being.

## 2023-07-04 ENCOUNTER — APPOINTMENT (INPATIENT)
Dept: RADIOLOGY | Facility: HOSPITAL | Age: 68
DRG: 605 | End: 2023-07-04
Payer: MEDICARE

## 2023-07-04 LAB
ANION GAP SERPL CALCULATED.3IONS-SCNC: 4 MMOL/L
BUN SERPL-MCNC: 7 MG/DL (ref 5–25)
CALCIUM SERPL-MCNC: 8.3 MG/DL (ref 8.3–10.1)
CHLORIDE SERPL-SCNC: 116 MMOL/L (ref 96–108)
CO2 SERPL-SCNC: 24 MMOL/L (ref 21–32)
CREAT SERPL-MCNC: 0.71 MG/DL (ref 0.6–1.3)
ERYTHROCYTE [DISTWIDTH] IN BLOOD BY AUTOMATED COUNT: 15.5 % (ref 11.6–15.1)
GFR SERPL CREATININE-BSD FRML MDRD: 87 ML/MIN/1.73SQ M
GLUCOSE SERPL-MCNC: 72 MG/DL (ref 65–140)
HCT VFR BLD AUTO: 24.3 % (ref 34.8–46.1)
HGB BLD-MCNC: 7.7 G/DL (ref 11.5–15.4)
HGB BLD-MCNC: 7.9 G/DL (ref 11.5–15.4)
MCH RBC QN AUTO: 29.7 PG (ref 26.8–34.3)
MCHC RBC AUTO-ENTMCNC: 31.7 G/DL (ref 31.4–37.4)
MCV RBC AUTO: 94 FL (ref 82–98)
PLATELET # BLD AUTO: 184 THOUSANDS/UL (ref 149–390)
PMV BLD AUTO: 10.4 FL (ref 8.9–12.7)
POTASSIUM SERPL-SCNC: 3.7 MMOL/L (ref 3.5–5.3)
RBC # BLD AUTO: 2.59 MILLION/UL (ref 3.81–5.12)
SODIUM SERPL-SCNC: 144 MMOL/L (ref 135–147)
WBC # BLD AUTO: 4.83 THOUSAND/UL (ref 4.31–10.16)

## 2023-07-04 PROCEDURE — 72191 CT ANGIOGRAPH PELV W/O&W/DYE: CPT

## 2023-07-04 PROCEDURE — 85027 COMPLETE CBC AUTOMATED: CPT

## 2023-07-04 PROCEDURE — 80048 BASIC METABOLIC PNL TOTAL CA: CPT | Performed by: SURGERY

## 2023-07-04 PROCEDURE — 99232 SBSQ HOSP IP/OBS MODERATE 35: CPT | Performed by: SURGERY

## 2023-07-04 PROCEDURE — G1004 CDSM NDSC: HCPCS

## 2023-07-04 PROCEDURE — 85018 HEMOGLOBIN: CPT

## 2023-07-04 RX ORDER — GABAPENTIN 100 MG/1
100 CAPSULE ORAL ONCE
Status: COMPLETED | OUTPATIENT
Start: 2023-07-04 | End: 2023-07-04

## 2023-07-04 RX ORDER — EPINEPHRINE 1 MG/ML
0.5 INJECTION, SOLUTION, CONCENTRATE INTRAVENOUS AS NEEDED
Status: DISCONTINUED | OUTPATIENT
Start: 2023-07-04 | End: 2023-07-07 | Stop reason: HOSPADM

## 2023-07-04 RX ORDER — DIPHENHYDRAMINE HYDROCHLORIDE 50 MG/ML
50 INJECTION INTRAMUSCULAR; INTRAVENOUS EVERY 6 HOURS PRN
Status: DISCONTINUED | OUTPATIENT
Start: 2023-07-04 | End: 2023-07-07 | Stop reason: HOSPADM

## 2023-07-04 RX ORDER — IODIXANOL 320 MG/ML
100 INJECTION, SOLUTION INTRAVASCULAR
Status: COMPLETED | OUTPATIENT
Start: 2023-07-04 | End: 2023-07-04

## 2023-07-04 RX ADMIN — LAMOTRIGINE 150 MG: 100 TABLET ORAL at 21:14

## 2023-07-04 RX ADMIN — SODIUM CHLORIDE, SODIUM GLUCONATE, SODIUM ACETATE, POTASSIUM CHLORIDE, MAGNESIUM CHLORIDE, SODIUM PHOSPHATE, DIBASIC, AND POTASSIUM PHOSPHATE 100 ML/HR: .53; .5; .37; .037; .03; .012; .00082 INJECTION, SOLUTION INTRAVENOUS at 09:58

## 2023-07-04 RX ADMIN — DULOXETINE HYDROCHLORIDE 60 MG: 60 CAPSULE, DELAYED RELEASE ORAL at 09:51

## 2023-07-04 RX ADMIN — ACETAMINOPHEN 650 MG: 325 TABLET, FILM COATED ORAL at 21:17

## 2023-07-04 RX ADMIN — PANTOPRAZOLE SODIUM 40 MG: 40 TABLET, DELAYED RELEASE ORAL at 05:10

## 2023-07-04 RX ADMIN — IODIXANOL 100 ML: 320 INJECTION, SOLUTION INTRAVASCULAR at 12:42

## 2023-07-04 RX ADMIN — SENNOSIDES 8.6 MG: 8.6 TABLET, FILM COATED ORAL at 21:14

## 2023-07-04 RX ADMIN — METOPROLOL SUCCINATE 25 MG: 25 TABLET, EXTENDED RELEASE ORAL at 09:50

## 2023-07-04 RX ADMIN — OXYCODONE HYDROCHLORIDE 5 MG: 5 TABLET ORAL at 19:16

## 2023-07-04 RX ADMIN — ACETAMINOPHEN 650 MG: 325 TABLET, FILM COATED ORAL at 13:03

## 2023-07-04 RX ADMIN — PRAZOSIN HYDROCHLORIDE 1 MG: 1 CAPSULE ORAL at 22:04

## 2023-07-04 RX ADMIN — HYDROXYCHLOROQUINE SULFATE 200 MG: 200 TABLET ORAL at 17:19

## 2023-07-04 RX ADMIN — MELATONIN TAB 3 MG 3 MG: 3 TAB at 21:17

## 2023-07-04 RX ADMIN — BUPROPION HYDROCHLORIDE 100 MG: 100 TABLET, FILM COATED ORAL at 17:19

## 2023-07-04 RX ADMIN — SPIRONOLACTONE 25 MG: 25 TABLET ORAL at 09:51

## 2023-07-04 RX ADMIN — ACETAMINOPHEN 650 MG: 325 TABLET, FILM COATED ORAL at 09:50

## 2023-07-04 RX ADMIN — HYDROXYCHLOROQUINE SULFATE 200 MG: 200 TABLET ORAL at 09:51

## 2023-07-04 RX ADMIN — CINACALCET 30 MG: 30 TABLET, FILM COATED ORAL at 09:51

## 2023-07-04 RX ADMIN — BUPROPION HYDROCHLORIDE 100 MG: 100 TABLET, FILM COATED ORAL at 09:51

## 2023-07-04 RX ADMIN — APIXABAN 5 MG: 5 TABLET, FILM COATED ORAL at 17:19

## 2023-07-04 RX ADMIN — LORAZEPAM 0.25 MG: 0.5 TABLET ORAL at 21:15

## 2023-07-04 RX ADMIN — APIXABAN 5 MG: 5 TABLET, FILM COATED ORAL at 09:51

## 2023-07-04 RX ADMIN — GABAPENTIN 100 MG: 100 CAPSULE ORAL at 05:14

## 2023-07-04 RX ADMIN — DULOXETINE HYDROCHLORIDE 60 MG: 60 CAPSULE, DELAYED RELEASE ORAL at 17:19

## 2023-07-04 NOTE — PROGRESS NOTES
[facility]  Progress Note  Name: Monika Alves  MRN: [id number]  [facility] I Date of Admission: 6/30/2023   Date of Service: 7/4/2023 I Hospital Day: 3    Assessment/Plan   Dysphagia  Assessment & Plan  - Chronic dysphagia  - speech consult placed- dysphagia 1 diet    Hypercalcemia  Assessment & Plan  - Continue home cinacalcet     Stroke [facility])  Assessment & Plan  - Recent stroke February 2023  - eliquis resumed 7/3 - hemoglobin trending down this AM. Given high risk with recent stroke, will continue Eliquis  - CTA pelvis ordered this morning to evaluate for active extravasation in L thigh hematoma     Bipolar depression ([address])  Assessment & Plan  - Continue home wellbutrin, cymbalta     CHF (congestive heart failure) ([facility])  Assessment & Plan  Wt Readings from Last 3 Encounters:   06/30/23 55.3 kg (122 lb)   06/06/23 47.9 kg (105 lb 9.6 oz)   05/01/23 56.8 kg (125 lb 3.2 oz)       - continue home metoprolol and aldactone      * Leg hematoma, left, initial encounter  Assessment & Plan  - home Eliquis held on admission but did not attempt reversal due to recent stroke. Resumed 7/3  - Trend H/H  - binder discontinued  - Hgb 6.8 on 7/2, 1 u pRBC ordered for transfusion  - hgb 7/4 7.7 from was 9  - obtain CTA pelvis today to evaluate for active bleeding        Bowel Regimen: Senokot  VTE Prophylaxis: Eliquis    Disposition: Continue current level of care, obtain CTA today. Not stable for discharge to [address] [place] [address] today d/t decrease in hemoglobin     Subjective   Chief Complaint: none    Subjective: Patient seen and examined at bedside, in no acute distress. No acute events overnight. Patient OOB ambulating to bathroom with assistance. Denies significant pain. Discussed need for CTA today with patient - she expresses previously having a rash with contrast many years ago without any further contrast issues following.  Denies rash with contrast this admission when receiving scan in trauma bay. Objective   Vitals:   Temp:  [97.8 °F (36.6 °C)-98.7 °F (37.1 °C)] 98.7 °F (37.1 °C)  HR:  [] 100  Resp:  [18-20] 18  BP: (117-137)/(61-89) 117/76    I/O       07/02 0701  07/03 0700 07/03 0701  07/04 0700 07/04 0701 07/05 0700    I.V. (mL/kg)  346.7 (6.3)     Blood 350      Total Intake(mL/kg) 350 (6.3) 346.7 (6.3)     Urine (mL/kg/hr)  121 (0.1)     Stool       Total Output  121     Net +350 +225.7            Unmeasured Urine Occurrence 2 x 2 x     Unmeasured Stool Occurrence 2 x             Physical Exam:  General: No acute distress, alert and oriented  CV: Well perfused, regular rate and rhythm  Lungs: Normal work of breathing, no increased respiratory effort  Abdomen: Soft, non-tender, non-distended. Extremities: Ecchymosis over left lateral thigh extending posteriorly toward patient's knee. Minimally tender to palpation. Skin: Warm, dry      Invasive Devices     Peripheral Intravenous Line  Duration           Long-Dwell Peripheral IV (Midline) 07/01/23 Left Brachial 2 days    Long-Dwell Peripheral IV (Midline) 77/52/89 Right Basilic <1 day                      Lab Results:   BMP/CMP:   Lab Results   Component Value Date    SODIUM 144 07/04/2023    K 3.7 07/04/2023     (H) 07/04/2023    CO2 24 07/04/2023    BUN 7 07/04/2023    CREATININE 0.71 07/04/2023    CALCIUM 8.3 07/04/2023    EGFR 87 07/04/2023    and CBC:   Lab Results   Component Value Date    WBC 4.83 07/04/2023    HGB 7.7 (L) 07/04/2023    HCT 24.3 (L) 07/04/2023    MCV 94 07/04/2023     07/04/2023    RBC 2.59 (L) 07/04/2023    MCH 29.7 07/04/2023    MCHC 31.7 07/04/2023    RDW 15.5 (H) 07/04/2023    MPV 10.4 07/04/2023     Imaging: I have personally reviewed pertinent reports.

## 2023-07-04 NOTE — ASSESSMENT & PLAN NOTE
- Recent stroke February 2023  - eliquis resumed 7/3 - hemoglobin trending down this AM. Given high risk with recent stroke, will continue Eliquis  - CTA pelvis ordered this morning to evaluate for active extravasation in L thigh hematoma

## 2023-07-04 NOTE — NURSING NOTE
Pt had unwitnessed fall oob, this nurse was in room next door and immediately ran to pt room when heard thud and bed alarm going off, pt was already on floor and stated she was reaching for her bag on the windowsill. Pt denies hitting her head and states she landed on her L hip where admission hematoma was located, pt helped back into bed, vitals taken and WNL, neuros WNL. Trauma on call doctor notified and came to bedside to assess pt. Peter belt placed on pt for safety and order obtained from Trauma provider.

## 2023-07-04 NOTE — QUICK NOTE
Called to bedside after unwitnessed fall at bedside. Patient denies head strike, states was trying to reach her bag. Patient is GCS15, at baseline neurologic exam. Will defer imaging at this time, however can pursue CT head for any change/decline in neurologic status.      Sharan Pearson, Emergency Medicine

## 2023-07-04 NOTE — PLAN OF CARE
Problem: MOBILITY - ADULT  Goal: Maintain or return to baseline ADL function  Description: INTERVENTIONS:  -  Assess patient's ability to carry out ADLs; assess patient's baseline for ADL function and identify physical deficits which impact ability to perform ADLs (bathing, care of mouth/teeth, toileting, grooming, dressing, etc.)  - Assess/evaluate cause of self-care deficits   - Assess range of motion  - Assess patient's mobility; develop plan if impaired  - Assess patient's need for assistive devices and provide as appropriate  - Encourage maximum independence but intervene and supervise when necessary  - Involve family in performance of ADLs  - Assess for home care needs following discharge   - Consider OT consult to assist with ADL evaluation and planning for discharge  - Provide patient education as appropriate  Outcome: Progressing  Goal: Maintains/Returns to pre admission functional level  Description: INTERVENTIONS:  - Perform BMAT or MOVE assessment daily.   - Set and communicate daily mobility goal to care team and patient/family/caregiver. - Collaborate with rehabilitation services on mobility goals if consulted  - Perform Range of Motion  times a day. - Reposition patient every  hours.   - Dangle patient  times a day  - Stand patient  times a day  - Ambulate patient  times a day  - Out of bed to chair  times a day   - Out of bed for meals  times a day  - Out of bed for toileting  - Record patient progress and toleration of activity level   Outcome: Progressing     Problem: PAIN - ADULT  Goal: Verbalizes/displays adequate comfort level or baseline comfort level  Description: Interventions:  - Encourage patient to monitor pain and request assistance  - Assess pain using appropriate pain scale  - Administer analgesics based on type and severity of pain and evaluate response  - Implement non-pharmacological measures as appropriate and evaluate response  - Consider cultural and social influences on pain and pain management  - Notify physician/advanced practitioner if interventions unsuccessful or patient reports new pain  Outcome: Progressing     Problem: INFECTION - ADULT  Goal: Absence or prevention of progression during hospitalization  Description: INTERVENTIONS:  - Assess and monitor for signs and symptoms of infection  - Monitor lab/diagnostic results  - Monitor all insertion sites, i.e. indwelling lines, tubes, and drains  - Monitor endotracheal if appropriate and nasal secretions for changes in amount and color  - [place] appropriate cooling/warming therapies per order  - Administer medications as ordered  - Instruct and encourage patient and family to use good hand hygiene technique  - Identify and instruct in appropriate isolation precautions for identified infection/condition  Outcome: Progressing  Goal: Absence of fever/infection during neutropenic period  Description: INTERVENTIONS:  - Monitor WBC    Outcome: Progressing     Problem: SAFETY ADULT  Goal: Maintain or return to baseline ADL function  Description: INTERVENTIONS:  -  Assess patient's ability to carry out ADLs; assess patient's baseline for ADL function and identify physical deficits which impact ability to perform ADLs (bathing, care of mouth/teeth, toileting, grooming, dressing, etc.)  - Assess/evaluate cause of self-care deficits   - Assess range of motion  - Assess patient's mobility; develop plan if impaired  - Assess patient's need for assistive devices and provide as appropriate  - Encourage maximum independence but intervene and supervise when necessary  - Involve family in performance of ADLs  - Assess for home care needs following discharge   - Consider OT consult to assist with ADL evaluation and planning for discharge  - Provide patient education as appropriate  Outcome: Progressing  Goal: Maintains/Returns to pre admission functional level  Description: INTERVENTIONS:  - Perform BMAT or MOVE assessment daily.   - Set and communicate daily mobility goal to care team and patient/family/caregiver. - Collaborate with rehabilitation services on mobility goals if consulted  - Perform Range of Motion  times a day. - Reposition patient every  hours.   - Dangle patient  times a day  - Stand patient  times a day  - Ambulate patient  times a day  - Out of bed to chair  times a day   - Out of bed for meals  times a day  - Out of bed for toileting  - Record patient progress and toleration of activity level   Outcome: Progressing  Goal: Patient will remain free of falls  Description: INTERVENTIONS:  - Educate patient/family on patient safety including physical limitations  - Instruct patient to call for assistance with activity   - Consult OT/PT to assist with strengthening/mobility   - Keep Call bell within reach  - Keep bed low and locked with side rails adjusted as appropriate  - Keep care items and personal belongings within reach  - Initiate and maintain comfort rounds  - Make Fall Risk Sign visible to staff  - Offer Toileting every  Hours, in advance of need  - Initiate/Maintain alarm  - Obtain necessary fall risk management equipment  - Apply yellow socks and bracelet for high fall risk patients  - Consider moving patient to room near nurses station  Outcome: Progressing     Problem: DISCHARGE PLANNING  Goal: Discharge to home or other facility with appropriate resources  Description: INTERVENTIONS:  - Identify barriers to discharge w/patient and caregiver  - Arrange for needed discharge resources and transportation as appropriate  - Identify discharge learning needs (meds, wound care, etc.)  - Arrange for interpretive services to assist at discharge as needed  - Refer to Case Management Department for coordinating discharge planning if the patient needs post-hospital services based on physician/advanced practitioner order or complex needs related to functional status, cognitive ability, or social support system  Outcome: Progressing Problem: Knowledge Deficit  Goal: Patient/family/caregiver demonstrates understanding of disease process, treatment plan, medications, and discharge instructions  Description: Complete learning assessment and assess knowledge base. Interventions:  - Provide teaching at level of understanding  - Provide teaching via preferred learning methods  Outcome: Progressing     Problem: Nutrition/Hydration-ADULT  Goal: Nutrient/Hydration intake appropriate for improving, restoring or maintaining nutritional needs  Description: Monitor and assess patient's nutrition/hydration status for malnutrition. Collaborate with interdisciplinary team and initiate plan and interventions as ordered. Monitor patient's weight and dietary intake as ordered or per policy. Utilize nutrition screening tool and intervene as necessary. Determine patient's food preferences and provide high-protein, high-caloric foods as appropriate.      INTERVENTIONS:  - Monitor oral intake, urinary output, labs, and treatment plans  - Assess nutrition and hydration status and recommend course of action  - Evaluate amount of meals eaten  - Assist patient with eating if necessary   - Allow adequate time for meals  - Recommend/ encourage appropriate diets, oral nutritional supplements, and vitamin/mineral supplements  - Order, calculate, and assess calorie counts as needed  - Recommend, monitor, and adjust tube feedings and TPN/PPN based on assessed needs  - Assess need for intravenous fluids  - Provide specific nutrition/hydration education as appropriate  - Include patient/family/caregiver in decisions related to nutrition  Outcome: Progressing     Problem: SAFETY,RESTRAINT: NV/NON-SELF DESTRUCTIVE BEHAVIOR  Goal: Remains free of harm/injury (restraint for non violent/non self-detsructive behavior)  Description: INTERVENTIONS:  - Instruct patient/family regarding restraint use   - Assess and monitor physiologic and psychological status   - Provide interventions and comfort measures to meet assessed patient needs   - Identify and implement measures to help patient regain control  - Assess readiness for release of restraint   Outcome: Progressing  Goal: Returns to optimal restraint-free functioning  Description: INTERVENTIONS:  - Assess the patient's behavior and symptoms that indicate continued need for restraint  - Identify and implement measures to help patient regain control  - Assess readiness for release of restraint   Outcome: Progressing     Problem: Prexisting or High Potential for Compromised Skin Integrity  Goal: Skin integrity is maintained or improved  Description: INTERVENTIONS:  - Identify patients at risk for skin breakdown  - Assess and monitor skin integrity  - Assess and monitor nutrition and hydration status  - Monitor labs   - Assess for incontinence   - Turn and reposition patient  - Assist with mobility/ambulation  - Relieve pressure over bony prominences  - Avoid friction and shearing  - Provide appropriate hygiene as needed including keeping skin clean and dry  - Evaluate need for skin moisturizer/barrier cream  - Collaborate with interdisciplinary team   - Patient/family teaching  - Consider wound care consult   Outcome: Progressing

## 2023-07-04 NOTE — ASSESSMENT & PLAN NOTE
- home Eliquis held on admission but did not attempt reversal due to recent stroke.  Resumed 7/3  - Trend H/H  - binder discontinued  - Hgb 6.8 on 7/2, 1 u pRBC ordered for transfusion  - hgb 7/4 7.7 from was 9  - obtain CTA pelvis today to evaluate for active bleeding

## 2023-07-05 LAB
ANION GAP SERPL CALCULATED.3IONS-SCNC: 5 MMOL/L
BASOPHILS # BLD AUTO: 0.01 THOUSANDS/ÂΜL (ref 0–0.1)
BASOPHILS NFR BLD AUTO: 0 % (ref 0–1)
BUN SERPL-MCNC: 7 MG/DL (ref 5–25)
CALCIUM SERPL-MCNC: 7.5 MG/DL (ref 8.3–10.1)
CHLORIDE SERPL-SCNC: 114 MMOL/L (ref 96–108)
CO2 SERPL-SCNC: 25 MMOL/L (ref 21–32)
CREAT SERPL-MCNC: 0.72 MG/DL (ref 0.6–1.3)
EOSINOPHIL # BLD AUTO: 0 THOUSAND/ÂΜL (ref 0–0.61)
EOSINOPHIL NFR BLD AUTO: 0 % (ref 0–6)
ERYTHROCYTE [DISTWIDTH] IN BLOOD BY AUTOMATED COUNT: 15.5 % (ref 11.6–15.1)
FLUAV RNA RESP QL NAA+PROBE: NEGATIVE
FLUBV RNA RESP QL NAA+PROBE: NEGATIVE
GFR SERPL CREATININE-BSD FRML MDRD: 85 ML/MIN/1.73SQ M
GLUCOSE SERPL-MCNC: 73 MG/DL (ref 65–140)
HCT VFR BLD AUTO: 22.5 % (ref 34.8–46.1)
HGB BLD-MCNC: 7.4 G/DL (ref 11.5–15.4)
IMM GRANULOCYTES # BLD AUTO: 0.03 THOUSAND/UL (ref 0–0.2)
IMM GRANULOCYTES NFR BLD AUTO: 1 % (ref 0–2)
LYMPHOCYTES # BLD AUTO: 0.76 THOUSANDS/ÂΜL (ref 0.6–4.47)
LYMPHOCYTES NFR BLD AUTO: 18 % (ref 14–44)
MCH RBC QN AUTO: 31 PG (ref 26.8–34.3)
MCHC RBC AUTO-ENTMCNC: 32.9 G/DL (ref 31.4–37.4)
MCV RBC AUTO: 94 FL (ref 82–98)
MONOCYTES # BLD AUTO: 0.54 THOUSAND/ÂΜL (ref 0.17–1.22)
MONOCYTES NFR BLD AUTO: 12 % (ref 4–12)
NEUTROPHILS # BLD AUTO: 3 THOUSANDS/ÂΜL (ref 1.85–7.62)
NEUTS SEG NFR BLD AUTO: 69 % (ref 43–75)
NRBC BLD AUTO-RTO: 0 /100 WBCS
PLATELET # BLD AUTO: 174 THOUSANDS/UL (ref 149–390)
PMV BLD AUTO: 10.3 FL (ref 8.9–12.7)
POTASSIUM SERPL-SCNC: 3.6 MMOL/L (ref 3.5–5.3)
RBC # BLD AUTO: 2.39 MILLION/UL (ref 3.81–5.12)
RSV RNA RESP QL NAA+PROBE: NEGATIVE
SARS-COV-2 RNA RESP QL NAA+PROBE: NEGATIVE
SODIUM SERPL-SCNC: 144 MMOL/L (ref 135–147)
WBC # BLD AUTO: 4.34 THOUSAND/UL (ref 4.31–10.16)

## 2023-07-05 PROCEDURE — 99232 SBSQ HOSP IP/OBS MODERATE 35: CPT | Performed by: INTERNAL MEDICINE

## 2023-07-05 PROCEDURE — 0241U HB NFCT DS VIR RESP RNA 4 TRGT: CPT | Performed by: PHYSICIAN ASSISTANT

## 2023-07-05 PROCEDURE — 85025 COMPLETE CBC W/AUTO DIFF WBC: CPT

## 2023-07-05 PROCEDURE — 99232 SBSQ HOSP IP/OBS MODERATE 35: CPT | Performed by: SURGERY

## 2023-07-05 PROCEDURE — 80048 BASIC METABOLIC PNL TOTAL CA: CPT

## 2023-07-05 RX ORDER — SPIRONOLACTONE 25 MG/1
25 TABLET ORAL DAILY
Status: DISCONTINUED | OUTPATIENT
Start: 2023-07-06 | End: 2023-07-07 | Stop reason: HOSPADM

## 2023-07-05 RX ORDER — GABAPENTIN 400 MG/1
400 CAPSULE ORAL 2 TIMES DAILY
Status: DISCONTINUED | OUTPATIENT
Start: 2023-07-05 | End: 2023-07-07 | Stop reason: HOSPADM

## 2023-07-05 RX ORDER — LANOLIN ALCOHOL/MO/W.PET/CERES
400 CREAM (GRAM) TOPICAL 2 TIMES DAILY
Status: DISCONTINUED | OUTPATIENT
Start: 2023-07-05 | End: 2023-07-07 | Stop reason: HOSPADM

## 2023-07-05 RX ADMIN — MAGNESIUM OXIDE TAB 400 MG (241.3 MG ELEMENTAL MG) 400 MG: 400 (241.3 MG) TAB at 11:11

## 2023-07-05 RX ADMIN — LAMOTRIGINE 150 MG: 100 TABLET ORAL at 21:14

## 2023-07-05 RX ADMIN — OXYCODONE HYDROCHLORIDE 5 MG: 5 TABLET ORAL at 17:37

## 2023-07-05 RX ADMIN — MAGNESIUM OXIDE TAB 400 MG (241.3 MG ELEMENTAL MG) 400 MG: 400 (241.3 MG) TAB at 17:40

## 2023-07-05 RX ADMIN — HYDROXYCHLOROQUINE SULFATE 200 MG: 200 TABLET ORAL at 15:53

## 2023-07-05 RX ADMIN — APIXABAN 5 MG: 5 TABLET, FILM COATED ORAL at 17:38

## 2023-07-05 RX ADMIN — ACETAMINOPHEN 650 MG: 325 TABLET, FILM COATED ORAL at 21:14

## 2023-07-05 RX ADMIN — ACETAMINOPHEN 650 MG: 325 TABLET, FILM COATED ORAL at 15:53

## 2023-07-05 RX ADMIN — ACETAMINOPHEN 650 MG: 325 TABLET, FILM COATED ORAL at 08:36

## 2023-07-05 RX ADMIN — GABAPENTIN 400 MG: 400 CAPSULE ORAL at 17:39

## 2023-07-05 RX ADMIN — OXYCODONE HYDROCHLORIDE 5 MG: 5 TABLET ORAL at 08:36

## 2023-07-05 RX ADMIN — HYDROMORPHONE HYDROCHLORIDE 0.2 MG: 0.2 INJECTION, SOLUTION INTRAMUSCULAR; INTRAVENOUS; SUBCUTANEOUS at 10:59

## 2023-07-05 RX ADMIN — PANTOPRAZOLE SODIUM 40 MG: 40 TABLET, DELAYED RELEASE ORAL at 05:06

## 2023-07-05 RX ADMIN — BUPROPION HYDROCHLORIDE 100 MG: 100 TABLET, FILM COATED ORAL at 08:43

## 2023-07-05 RX ADMIN — BUPROPION HYDROCHLORIDE 100 MG: 100 TABLET, FILM COATED ORAL at 17:39

## 2023-07-05 RX ADMIN — SENNOSIDES 8.6 MG: 8.6 TABLET, FILM COATED ORAL at 21:14

## 2023-07-05 RX ADMIN — METOPROLOL SUCCINATE 25 MG: 25 TABLET, EXTENDED RELEASE ORAL at 08:36

## 2023-07-05 RX ADMIN — GABAPENTIN 400 MG: 400 CAPSULE ORAL at 11:11

## 2023-07-05 RX ADMIN — DULOXETINE HYDROCHLORIDE 60 MG: 60 CAPSULE, DELAYED RELEASE ORAL at 17:38

## 2023-07-05 RX ADMIN — SPIRONOLACTONE 25 MG: 25 TABLET ORAL at 08:36

## 2023-07-05 RX ADMIN — DULOXETINE HYDROCHLORIDE 60 MG: 60 CAPSULE, DELAYED RELEASE ORAL at 08:36

## 2023-07-05 RX ADMIN — OXYCODONE HYDROCHLORIDE 5 MG: 5 TABLET ORAL at 12:35

## 2023-07-05 RX ADMIN — HYDROXYCHLOROQUINE SULFATE 200 MG: 200 TABLET ORAL at 08:44

## 2023-07-05 RX ADMIN — MELATONIN TAB 3 MG 3 MG: 3 TAB at 21:14

## 2023-07-05 RX ADMIN — APIXABAN 5 MG: 5 TABLET, FILM COATED ORAL at 08:36

## 2023-07-05 RX ADMIN — CINACALCET 30 MG: 30 TABLET, FILM COATED ORAL at 11:11

## 2023-07-05 NOTE — PROGRESS NOTES
Progress Note - Geriatric Medicine   Lawrence Ortega 70 y.o. female MRN: [id number]  [facility] Encounter: [id number]      Assessment/Plan:    Ambulatory dysfunction with fall  -reportedly mechanical fall at home on 6/30  -in setting of daily eliquis use for CVA (2/2023)  -injuries as outlined below  -Requires use of walker/wheelcvhair for ambulation at baseline  -hx recurrent falls, endorses at least one additional in past six months   -remains high risk recurrent falls, cont fall precautions   -PT and OT following      Left hip hematoma  -s/p fall as outlined above  -noted on CT chest/abd/pelvis on admission, findings reportedly concerning for active extravasation   -in setting of chronic daily eliquis use for hx embolic CVA  -with acute blood loss anemia requiring PRBC earlier in admission   -Hb continues to trend down, 7.4 this am from 9 on 7/3  -monitor for ongoing blood loss and transfuse as indicated     Acute blood loss anemia  -evidenced by >2g drop in Hb from 10.7 to 6.8  -required PRBC transfusion earlier in admission   -see above      Acute pain due to trauma   -continue acute multimodal pain control, home deandre resumed   -recommend  bowel regimen to prevent and treat constipation due to increased risk with acute pain and opiate pain medications     Dysphagia  -requiring modified diet, speech on consult, appreciate recs  -consider boost/ensure supplements   -continue aspiration precaution     Cognitive screening   -alert and oriented but does endorse short term memory trouble concerning for mild cog impairment  -at baseline oriented but forgetful, pt reports symptoms markedly worse since recurrent CVA in 2/2023  -requires assist with ADLs and iADls due to both physical and cognitive deficits at baseline   -short version mini-cog 9/15 (2021), no repeat on record, consider repeating following recovery from acute injuries   -[facility] 6/30 personally viewed, in addition to diffuse chronic microangiopathic changes large area encephalomalacia noted R MCA distribution consistent with prior CVA  -TSH 2.171, no recent B12, consider checking with routine labs   -Encouraged to remain physically, socially, cognitively active and engaged to maintain cognitive acuity     Hx CVA  -with residual left sided weakness, dysphagia and cognitive impairments  -continue secondary risk factor modifications   -continue o/p f/u with Neurology      Rheumatoid arthritis   -Maintained on Plaquenil chronically as outpatient  -maintained on oral morphine chronically as outpatient for treatment of chronic back and joint pain related to underlying rheumatoid arthritis  -Continue close outpatient follow-up with Pain Management and Rheumatology for ongoing management     Chronic opiate dependence  -maintained on morphine chronically as above as confirmed with PDMP  -continue close o/p f/u with pain management     Impaired Vision  -recommend use of corrective lenses at all appropriate times  -encourage appropriate footwear at all times     Bipolar disorder  -continued on home Lamictal, Cymbalta and Wellbutrin regimen  -may benefit from referral to outpatient support group for stroke survivors as may significantly impact both physical and mental health   -consider weaning off Gabapentin to minimize duplication of therapy as is on Cymbalta and to minimize polypharmacy   -does not appear to be on lorazepam as o/p since 1/2023, would not recommend resuming at this time if not actively taking as may increase risk confusion, falls, and oversedation in frail elderly individual   -continue close o/p with Psychiatry for ongoing management       Frailty syndrome in geriatric patient   -Clinical frailty scale stage VI, moderately frail  -Multifactorial including age, left hemiparesis due to recent CVA, and multiple chronic medical co-morbidities now with ambulatory dysfunction and fall in elderly individual with very limited physiologic and metabolic reserve  -continue optimization chronic conditions and address acute derangements as arise   -continue psychosocial supports of patient and family      High risk developing delirium   -continue delirium precautions  -maintain normal sleep/wake cycle  -monitor for fecal and urinary retention which may precipitate delirium  -encourage early mobilization and ambulation with assist as cleared to safely do so  -provide frequent reorientation and redirection as indicated and appropriate   -redirect unwanted behaviors as first line    Care coordination: rounded with Rajat Leong (RN)    Subjective:     Deborah Dukes is seen and examined at bedside where she is lying resting, she reports continued severe pain in her left thigh and feels that her current pain regimen is helpful. She reports restlessness leg symptoms for which she takes Gabapentin, she feels symptoms are acutely worse and inquires if she would be candidate to change to Lyrica from Gabapentin. Review of Systems   Constitutional: Negative. Negative for chills and fever. HENT: Negative. Eyes: Negative. Respiratory: Negative. Cardiovascular: Negative. Gastrointestinal: Negative. Genitourinary: Negative. Musculoskeletal: Positive for arthralgias. Left hip/thigh pain remains severe   Skin: Negative. Neurological: Positive for weakness. Restless leg type symptoms feel much worse than baseline    Hematological: Negative. Psychiatric/Behavioral: Negative. All other systems reviewed and are negative. Objective:     Vitals: Blood pressure 120/66, pulse 99, temperature (!) 97.4 °F (36.3 °C), resp. rate 18, height 5' 3" (1.6 m), weight 55.3 kg (122 lb), SpO2 99 %. ,Body mass index is 21.61 kg/m².       Intake/Output Summary (Last 24 hours) at 7/5/2023 1054  Last data filed at 7/4/2023 1230  Gross per 24 hour   Intake 120 ml   Output --   Net 120 ml     Current Medications: Reviewed    Physical Exam:   Physical Exam  Vitals and nursing note reviewed. Constitutional:       General: She is not in acute distress. Comments: Thin frail elderly female    HENT:      Head: Normocephalic. Nose: Nose normal.      Mouth/Throat:      Mouth: Mucous membranes are dry. Comments: Missing multiple teeth  Eyes:      General:         Right eye: No discharge. Left eye: No discharge. Conjunctiva/sclera: Conjunctivae normal.   Neck:      Comments: Trachea midline, voice hoarse   Cardiovascular:      Rate and Rhythm: Normal rate. Pulses: Normal pulses. Pulmonary:      Effort: Pulmonary effort is normal. No respiratory distress. Breath sounds: No wheezing. Comments: Sat well on room air   Abdominal:      Palpations: Abdomen is soft. Tenderness: There is no abdominal tenderness. Musculoskeletal:      Cervical back: Neck supple. Right lower leg: No edema. Left lower leg: No edema. Comments: Diffuse severe subcutaneous fat and muscle wasting    Skin:     General: Skin is warm and dry. Comments: Extensive ecchymosis LLE    Neurological:      Mental Status: She is alert. Comments: Awake and alert, answers questions appropriately   Psychiatric:         Mood and Affect: Mood normal.         Behavior: Behavior normal.      Comments: Pleasant and cooperative         Invasive Devices     Peripheral Intravenous Line  Duration           Long-Dwell Peripheral IV (Midline) 63/61/62 Right Basilic 1 day              Lab, Imaging and other studies: I have personally reviewed pertinent reports.

## 2023-07-05 NOTE — CASE MANAGEMENT
Case Management Discharge Planning Note    Patient name Gregorio Domingo  Location [address] [place] [address] Road [facility] 246-91 MRN [id number]  : 1955 Date 2023       Current Admission Date: 2023  Current Admission Diagnosis:Leg hematoma, left, initial encounter   Patient Active Problem List    Diagnosis Date Noted   • Leg hematoma, left, initial encounter 2023   • Cerebral aneurysm, nonruptured 05/15/2023   • Nocturnal hypoxia 2023   • Stroke-like symptoms 2023   • Insomnia 2023   • Hypomagnesemia 2023   • Sinus tachycardia 2023   • Hypokalemia 03/10/2023   • AMS (altered mental status) 2023   • Gout of foot 2023   • Dysphagia 2023   • Wound of left ankle 2023   • Obstructive sleep apnea 2023   • History of stroke 2023   • History of bacteremia 2023   • Acute CVA (cerebrovascular accident) ([address]) 2023   • Hypercalcemia 2023   • Discoloration of skin of foot 2023   • Abdominal pain 2023   • HFrEF (heart failure with reduced ejection fraction) ([address]) 2023   • Stroke ([address]) 2023   • HTN (hypertension) 2023   • Cardiomyopathy ([address]) 2023   • Vitamin D insufficiency 2023   • LAILA (acute kidney injury) ([address]) 2022   • Polypharmacy 2022   • 'light-for-dates' infant with signs of fetal malnutrition 2022   • Bipolar depression ([address]) 2022   • Rheumatoid arthritis of multiple sites with negative rheumatoid factor ([address]) 2022   • Closed fracture of multiple ribs of right side 2022   • Traumatic pneumothorax 2022   • Closed fracture of transverse process of lumbar vertebra ([address]) 2022   • Liver contusion 2022   • Fall 2022   • Acute pain due to trauma 2022   • Right shoulder pain 2022   • Severe protein-calorie malnutrition ([address]) 2022   • Undifferentiated connective tissue disease ([address]) 2021   • Primary generalized (osteo)arthritis 12/29/2021   • Peripheral neuropathy 12/29/2021   • Osteoporosis 12/29/2021   • Hx of compression fracture of spine 12/29/2021   • Lumbar spondylosis 12/29/2021   • Cervical spondylosis 12/29/2021   • Anemia 06/26/2021   • Chronic pain syndrome    • Mild intermittent asthma without complication    • Ambulatory dysfunction    • Seronegative arthropathy of multiple sites (HCC)    • Gastroesophageal reflux disease without esophagitis    • Mixed stress and urge urinary incontinence    • CHF (congestive heart failure) ([address] [facility])       LOS (days): 4  Geometric Mean LOS (GMLOS) (days): 3.80  Days to GMLOS:-0.2     OBJECTIVE:  Risk of Unplanned Readmission Score: 44.88         Current admission status: Inpatient   Preferred Pharmacy:   [address] [place] [id number]  Phone: [phone] Fax: [phone] [facility], [address] [place] [address]  [address] [place] [address]  [address] [place] [address] [address] [address]  Phone: [phone] Fax: [phone]    Primary Care Provider: Cecilia Morrissey DO    Primary Insurance: MEDICARE  Secondary Insurance: A[employer]    DISCHARGE DETAILS:    Pt medically can d/c to [facility].  CM informed them

## 2023-07-05 NOTE — PLAN OF CARE
Problem: INFECTION - ADULT  Goal: Absence or prevention of progression during hospitalization  Description: INTERVENTIONS:  - Assess and monitor for signs and symptoms of infection  - Monitor lab/diagnostic results  - Monitor all insertion sites, i.e. indwelling lines, tubes, and drains  - Monitor endotracheal if appropriate and nasal secretions for changes in amount and color  - [place] appropriate cooling/warming therapies per order  - Administer medications as ordered  - Instruct and encourage patient and family to use good hand hygiene technique  - Identify and instruct in appropriate isolation precautions for identified infection/condition  Outcome: Progressing     Problem: MOBILITY - ADULT  Goal: Maintain or return to baseline ADL function  Description: INTERVENTIONS:  -  Assess patient's ability to carry out ADLs; assess patient's baseline for ADL function and identify physical deficits which impact ability to perform ADLs (bathing, care of mouth/teeth, toileting, grooming, dressing, etc.)  - Assess/evaluate cause of self-care deficits   - Assess range of motion  - Assess patient's mobility; develop plan if impaired  - Assess patient's need for assistive devices and provide as appropriate  - Encourage maximum independence but intervene and supervise when necessary  - Involve family in performance of ADLs  - Assess for home care needs following discharge   - Consider OT consult to assist with ADL evaluation and planning for discharge  - Provide patient education as appropriate  Outcome: Progressing

## 2023-07-05 NOTE — PLAN OF CARE
Problem: MOBILITY - ADULT  Goal: Maintain or return to baseline ADL function  Description: INTERVENTIONS:  -  Assess patient's ability to carry out ADLs; assess patient's baseline for ADL function and identify physical deficits which impact ability to perform ADLs (bathing, care of mouth/teeth, toileting, grooming, dressing, etc.)  - Assess/evaluate cause of self-care deficits   - Assess range of motion  - Assess patient's mobility; develop plan if impaired  - Assess patient's need for assistive devices and provide as appropriate  - Encourage maximum independence but intervene and supervise when necessary  - Involve family in performance of ADLs  - Assess for home care needs following discharge   - Consider OT consult to assist with ADL evaluation and planning for discharge  - Provide patient education as appropriate  Outcome: Progressing  Goal: Maintains/Returns to pre admission functional level  Description: INTERVENTIONS:  - Perform BMAT or MOVE assessment daily.   - Set and communicate daily mobility goal to care team and patient/family/caregiver.    - Collaborate with rehabilitation services on mobility goals if consulted    - Out of bed for toileting  - Record patient progress and toleration of activity level   Outcome: Progressing

## 2023-07-05 NOTE — PROGRESS NOTES
PHYSICAL MEDICINE AND REHABILITATION   PREADMISSION ASSESSMENT     Projected [facility] Diagnoses:  Impairment of mobility, safety and Activities of Daily Living (ADLs) due to Debility:  16  Debility (Non-cardiac/Non-pulmonary)  Etiologic: Left leg/left hip hematoma with evidence of active extravasation  Date of Onset: 6/30/2023   Date of surgery: N/A    PATIENT INFORMATION  Name: Aldo Alston Phone #: [phone] (home)   Address: [address] [place] [address]  YOB: 1955 Age: 70 y.o. SS# [id number]  Marital Status:   Ethnicity:Not  or   Employment Status: retired  Extended Emergency Contact Information  Primary Emergency Contact: Marlee [place] [address] Phone: [phone]  Relation: Daughter  Secondary Emergency Contact: ROMANA KESSLER  [employer] Phone: [phone]  Relation: Son  Advance Directive: Code: Level 1- Full Code (no ACP docs)    INSURANCE/COVERAGE:     Primary Payor: MEDICARE / Plan: MEDICARE A AND B / Product Type: Medicare A & B Fee for Service /   Moe Redder  ID# [id number]   Payer Contact:  Payer Contact:   Contact Phone:  Contact Phone:       MEDICARE #: [id number]  Medicare Days: 15/30/60  Medical Record #: [id number]    REFERRAL SOURCE:   Referring provider: Tresa Niño MD  Referring facility: [address] [place] [address]  Room: [address] [place] [address]  PCP: Carlin Andujar DO PCP phone number: [phone]    MEDICAL INFORMATION  HPI: Pt is a 70year old female w PMH HTN, hx of CVA on Eliquis, CHF, GERD, SHAR, gout, bipolar depression, RA, presented as a trauma transfer from [facility] after mechanical fall down her front porch stairs. Found to have left leg/left hip hematoma with evidence of active extravasation on CT imaging.  Patient is poor historian and not able to give much detail of the event but apparently this morning had gotten up thinking she was opening the door to the bathroom when she opened her front door and fell down the front porch stairs, hit and landed on her left hip/left side with immediate pain to the area, she does not think she hit her head, denies loss of consciousness, does take Eliquis for history of recent stroke in February 2023.  Patient apparently had hypotension with systolics in the 05C in triage at [facility] but has since normalized.  Also of note patient has history of difficulty with ambulation and multiple prior falls with multiple prior/healing injuries seen on imaging. Binder present over hips for tamponade of hematoma, continue to monitor left lower extremity exam specifically thigh compartment but now been discontinued. Hgb 6.8 on 7/2, 1 u pRBC ordered for transfusion. Hgb 7/3 was 9.5 post transfusion. Patient also with chronic dysphagia. Speech consult placed- dysphagia 1 diet. Repeat VBS later this week. Patient had a fall morning of 7/4. CTA of left hip negative for any new findings/worsening hematoma. Hgb the morning of 7/5  7.4 (was 9.5 and 9.0 yesterday 7/4). PT,OT, ST have been consulted and are recommending post-acute rehab services. Patient's case has been reviewed with [facility] medical director, patient meets medical criteria for acute rehab and has demonstrated the ability to tolerate three or more hours of therapy per day. Patient is medically stable and ready for discharge to the [facility]. Past Medical History:   Past Surgical History:    Allergies:     Past Medical History:   Diagnosis Date   • Anxiety    • Asthma    • Bipolar depression ([address])    • Chronic narcotic dependence ([address])    • Chronic pain    • Depression    • Enterovirus heart infection    • SHAR (obstructive sleep apnea)    • Osteoarthritis    • Peripheral neuropathy    • Plantar fasciitis of right foot    • Senile osteoporosis    • Seronegative arthropathy of multiple sites [facility])    • Undifferentiated connective tissue disease ([address])     Past Surgical History:   Procedure Laterality Date • BREAST IMPLANT     • FIXATION KYPHOPLASTY LUMBAR SPINE     • HYSTERECTOMY     • INCONTINENCE SURGERY N/A    • IR STROKE ALERT  2/12/2023   • NASAL SEPTOPLASTY W/ TURBINOPLASTY N/A    • RECTAL PROLAPSE REPAIR N/A    • REPAIR RECTOCELE     • RHINOPLASTY N/A      Allergies   Allergen Reactions   • Cephalosporins Other (See Comments)     rash   • Iodine - Food Allergy Anaphylaxis     Anaphylaxis     • Methocarbamol Hives   • Sulfa Antibiotics Hives   • Bactrim [Sulfamethoxazole-Trimethoprim] Hives   • Erythromycin Hives   • Penicillins    • Shrimp Extract Allergy Skin Test - Food Allergy Other (See Comments)   • Trimethoprim Hives         Medical/functional conditions requiring inpatient rehabilitation:Risk for medical/clinical complications: ***    Comorbidities/Surgeries in the last 100 days: CHF, Bipolar depression, stroke, hypercalcemia, dysphagia    CURRENT VITAL SIGNS:   Temp:  [97.4 °F (36.3 °C)-98.5 °F (36.9 °C)] 97.4 °F (36.3 °C)  HR:  [] 99  Resp:  [16-18] 18  BP: (114-139)/(62-68) 120/66 No intake or output data in the 24 hours ending 07/05/23 1248     LABORATORY RESULTS:      Lab Results   Component Value Date    HGB 7.4 (L) 07/05/2023    HCT 22.5 (L) 07/05/2023    WBC 4.34 07/05/2023     Lab Results   Component Value Date    BUN 7 07/05/2023    K 3.6 07/05/2023     (H) 07/05/2023    GLUCOSE 87 11/17/2016    CREATININE 0.72 07/05/2023     Lab Results   Component Value Date    PROTIME 16.7 (H) 07/01/2023    INR 1.33 (H) 07/01/2023        DIAGNOSTIC STUDIES:  CTA pelvis w wo contrast    Result Date: 7/4/2023  Impression: 1. Decreasing size of left lateral thigh hematoma without evidence for active extravasation. 2.  No CT findings in the visualized pelvis and thighs to explain falling hematocrit. 3.  Pancolitis with increasing inflammatory free fluid in the pelvis. No free air, pneumatosis, or encapsulated collections demonstrated. The study was marked in [facility] for immediate notification. Workstation performed: [id number]     XR chest 1 view portable    Result Date: 7/2/2023  Impression: No acute cardiopulmonary disease. Workstation performed: [id number]     CT head without contrast    Result Date: 6/30/2023  Impression: No acute intracranial pathology. Stable chronic right MCA infarct. Workstation performed: [id number]     CT cervical spine without contrast    Result Date: 6/30/2023  Impression: No cervical spine fracture or traumatic malalignment. Workstation performed: [id number]     CT chest abdomen pelvis w contrast    Result Date: 6/30/2023  Impression: Large hematoma in the lateral left thigh measuring up to 9.6 cm with focus of active contrast extravasation. No acute intrathoracic or intra-abdominal injury.  I personally discussed this study with Asael on 6/30/2023 3:37 PM. Workstation performed: [id number]       PRECAUTIONS/SPECIAL NEEDS:  Isolation Precautions:  Contact, Anticoagulation:  Eliquis, Pain Management and Aspiration Risk/Precautions    MEDICATIONS:     Current Facility-Administered Medications:   •  acetaminophen (TYLENOL) tablet 650 mg, 650 mg, Oral, Q6H [address], Alondra Rosa MD, 650 mg at 07/05/23 0836  •  albuterol (PROVENTIL HFA,VENTOLIN HFA) inhaler 2 puff, 2 puff, Inhalation, Q6H PRN, Alondra Rosa MD  •  apixaban Mirna Blight) tablet 5 mg, 5 mg, Oral, BID, Nick Estrella MD, 5 mg at 07/05/23 7745  •  buPROPion [facility]) tablet 100 mg, 100 mg, Oral, BID, Alondra Rosa MD, 100 mg at 07/05/23 5740  •  cinacalcet (SENSIPAR) tablet 30 mg, 30 mg, Oral, Daily With Breakfast, Alondra Rosa MD, 30 mg at 07/05/23 1111  •  diphenhydrAMINE (BENADRYL) injection 50 mg, 50 mg, Intravenous, Q6H PRN, Abel Parra MD  •  DULoxetine (CYMBALTA) delayed release capsule 60 mg, 60 mg, Oral, BID, Alondra Rosa MD, 60 mg at 07/05/23 0836  •  EPINEPHrine PF (ADRENALIN) 1 mg/mL injection 0.5 mg, 0.5 mg, Intramuscular, PRN, Abel Parra MD  • gabapentin (NEURONTIN) capsule 400 mg, 400 mg, Oral, BID, Carolyn Pryor PA-C, 400 mg at 07/05/23 1111  •  HYDROmorphone HCl (DILAUDID) injection 0.2 mg, 0.2 mg, Intravenous, Q4H PRN, Asuncion Jeans, MD, 0.2 mg at 07/05/23 1059  •  hydroxychloroquine (PLAQUENIL) tablet 200 mg, 200 mg, Oral, BID With Meals, Asuncion Jeans, MD, 200 mg at 07/05/23 0844  •  labetalol (NORMODYNE) injection 10 mg, 10 mg, Intravenous, Q6H PRN, Asuncion Jeans, MD  •  lamoTRIgine (LaMICtal) tablet 150 mg, 150 mg, Oral, HS, Asuncion Jeans, MD, 150 mg at 07/04/23 2114  •  LORazepam (ATIVAN) tablet 0.25 mg, 0.25 mg, Oral, Q12H PRN, Zakia Hill MD, 0.25 mg at 07/04/23 2115  •  magnesium Oxide (MAG-OX) tablet 400 mg, 400 mg, Oral, BID, Carolyn Pryor PA-C, 400 mg at 07/05/23 1111  •  melatonin tablet 3 mg, 3 mg, Oral, HS, Herberth Livingston PA-C, 3 mg at 07/04/23 2117  •  metoprolol succinate (TOPROL-XL) 24 hr tablet 25 mg, 25 mg, Oral, Daily, Asuncion Jeans, MD, 25 mg at 07/05/23 0836  •  ondansetron [facility]) injection 4 mg, 4 mg, Intravenous, Q6H PRN, Asuncion Jeans, MD, 4 mg at 07/03/23 1519  •  oxyCODONE (ROXICODONE) IR tablet 5 mg, 5 mg, Oral, Q4H PRN, Asuncion Jeans, MD, 5 mg at 07/05/23 1235  •  oxyCODONE (ROXICODONE) split tablet 2.5 mg, 2.5 mg, Oral, Q4H PRN, Asuncion Jeans, MD, 2.5 mg at 07/02/23 0347  •  pantoprazole (PROTONIX) EC tablet 40 mg, 40 mg, Oral, Early Morning, Asuncion Jeans, MD, 40 mg at 07/05/23 5111  •  prazosin (MINIPRESS) capsule 1 mg, 1 mg, Oral, HS, Asuncion Jeans, MD, 1 mg at 07/04/23 2204  •  senna (SENOKOT) tablet 8.6 mg, 1 tablet, Oral, HS, Annie Pinto MD, 8.6 mg at 07/04/23 2114  •  spironolactone (ALDACTONE) tablet 25 mg, 25 mg, Oral, Daily, Asuncion Jeans, MD, 25 mg at 07/05/23 0836    SKIN INTEGRITY:   Hematoma left hip, scattered bruising, abrasion to R 5th toe    PRIOR LEVEL OF FUNCTION:  She lives in a(n) single family home  [place] is  and lives with their family. Self Care: Independent, Indoor Mobility: Mod I with RW, Stairs (in/outdoor): Mod I with RW and Cognition: {ARC PRE FUNCTION [id number]}    Patient lives in a 2-story house w/ 1 TAB with her son and brother, +1 step down to the bathroom. Son and his girlfriend assist the patient at home as needed. Home all the time.       FALLS IN THE LAST 6 MONTHS: 1-4    HOME ENVIRONMENT:  The living area: can live on one level  There are 1 step to enter the home. The patient will have 24 hour supervision/physical assistance available upon discharge. PREVIOUS DME:  Equipment in home (previous DME): Rolling Walker    FUNCTIONAL STATUS:***  Physical Therapy Occupational Therapy Speech Therapy          CARE SCORES:  Self Care:  Eating: {ARC Pre Admission Screenin}  Oral hygiene: {ARC Pre Admission Screenin}  Toilet hygiene: {ARC Pre Admission Screenin}  Shower/bathing self: {ARC Pre Admission Screenin}  Upper body dressing: {ARC Pre Admission Screenin}  Lower body dressing: {ARC Pre Admission Screenin}  Putting on/taking off footwear: {ARC Pre Admission Screenin}  Transfers:  Roll left and right: {ARC Pre Admission Screenin}  Sit to lying: {ARC Pre Admission Screenin}  Lying to sitting on side of bed: {ARC Pre Admission Screenin}  Sit to stand: {ARC Pre Admission Screenin}  Chair/bed to chair transfer: {ARC Pre Admission Screenin}  Toilet transfer: {ARC Pre Admission Screenin}  Mobility:  Walk 10 ft: {ARC Pre Admission Screenin}  Walk 50 ft with two turns: {ARC Pre Admission Screenin}  Walk 150ft: {ARC Pre Admission Screenin}    CURRENT GAP IN FUNCTION  Prior to Admission: Patient was I with mobility with RW, ADL’s, required A with IADL’s.        Expected functional outcomes:  It is expected that with skilled acute rehabilitation services the patient will progress to Supervision for self care and Supervision for mobility     Estimated length of stay: 10 to 14 days    Anticipated Post-Discharge Disposition/Treatment  Disposition: Return to previous home/apartment. Outpatient Services: Physical Therapy (PT), Occupational Therapy (OT) and Speech Therapy    BARRIERS TO DISCHARGE  Weakness, Pain, Balance Difficulty, Fatigue and Resource Availability    INTERVENTIONS FOR DISCHARGE  Adaptive equipment, Patient/Family/Caregiver Education, [employer], Support Group, Arrange DME needs, Medication Changes Per [facility] MD recommendations, Therapy exercises, Center of balance support  and Energy conservation education     REQUIRED THERAPY:  Patient will require PT, OT and ST 60 minutes each per day, five days per week to achieve rehab goals. REQUIRED FUNCTIONAL AND MEDICAL MANAGEMENT FOR INPATIENT REHABILITATION:  Skin:  There are no pressure sores currently, hematoma to L hip, scattered bruising, Pain Management: Overall pain is moderately controlled    RECOMMENDED LEVEL OF CARE: Pt is a 70year old female w PMH HTN, hx of CVA on Eliquis, CHF, GERD, SHAR, gout, bipolar depression, RA, presented as a trauma transfer from [facility] after mechanical fall down her front porch stairs. Found to have left leg/left hip hematoma with evidence of active extravasation on CT imaging.  Patient is poor historian and not able to give much detail of the event but apparently this morning had gotten up thinking she was opening the door to the bathroom when she opened her front door and fell down the front porch stairs, hit and landed on her left hip/left side with immediate pain to the area, she does not think she hit her head, denies loss of consciousness, does take Eliquis for history of recent stroke in February 2023.  Patient apparently had hypotension with systolics in the 79X in triage at [facility] but has since normalized.  Also of note patient has history of difficulty with ambulation and multiple prior falls with multiple prior/healing injuries seen on imaging. Binder present over hips for tamponade of hematoma, continue to monitor left lower extremity exam specifically thigh compartment but now been discontinued. Hgb 6.8 on 7/2, 1 u pRBC ordered for transfusion. Hgb 7/3 was 9.5 post transfusion. Patient also with chronic dysphagia. Speech consult placed- dysphagia 1 diet. Repeat VBS later this week. Patient had a fall morning of 7/4. CTA of left hip negative for any new findings/worsening hematoma. Hgb the morning of 7/5  7.4 (was 9.5 and 9.0 yesterday 7/4). PTA patient was indpenedent with ADLs and functional mobility with a walker and is now debilitated to ***levels for FM and ***levels for ADLs. PT,ST and OT have been consulted and are recommending post-acute rehab services. Patient's case has been reviewed with [facility] medical director, patient meets medical criteria for acute rehab and has demonstrated the ability to tolerate three or more hours of therapy per day. Patient is medically stable and ready for discharge to the [facility]. Pt would benefit from [facility] admission to have close medical management while participating in 3 hours of therapy per day that will include physical,speech and occupational therapy. Physical and occupational therapists will address functional mobility deficits and assist patient in improving their strength, endurance, ROM, and self care. ST will address chronic dysphagia and will advance diet as able. Pt will have 24/7 nursing care to monitor routine vitals, skin integrity, and overall condition. The rehab nursing staff will follow therapy recommendations to have 24 hour follow through during non-therapy hours. PM&R to maximize function and provide medical oversight. The MD will monitor patient co-morbidities while on the unit as well as order any additional tests, labs, and consults needed.  The [facility] specialized interdisciplinary team will meet weekly to discuss patient overall medical status and rehab goals in preparation for D/C home. Inpatient acute rehab is recommended for patient to maximize overall strength, endurance, self care, and mobility for a safe and timely transition back home.

## 2023-07-05 NOTE — CASE MANAGEMENT
Case Management Discharge Planning Note    Patient name Yaz Herrera  Location [address] [place] [address] Road [facility] [id number] MRN [id number]  : 1955 Date 2023       Current Admission Date: 2023  Current Admission Diagnosis:Leg hematoma, left, initial encounter   Patient Active Problem List    Diagnosis Date Noted   • Leg hematoma, left, initial encounter 2023   • Cerebral aneurysm, nonruptured 05/15/2023   • Nocturnal hypoxia 2023   • Stroke-like symptoms 2023   • Insomnia 2023   • Hypomagnesemia 2023   • Sinus tachycardia 2023   • Hypokalemia 03/10/2023   • AMS (altered mental status) 2023   • Gout of foot 2023   • Dysphagia 2023   • Wound of left ankle 2023   • Obstructive sleep apnea 2023   • History of stroke 2023   • History of bacteremia 2023   • Acute CVA (cerebrovascular accident) ([address]) 2023   • Hypercalcemia 2023   • Discoloration of skin of foot 2023   • Abdominal pain 2023   • HFrEF (heart failure with reduced ejection fraction) ([address]) 2023   • Stroke ([address]) 2023   • HTN (hypertension) 2023   • Cardiomyopathy ([address]) 2023   • Vitamin D insufficiency 2023   • LAILA (acute kidney injury) ([address]) 2022   • Polypharmacy 2022   • 'light-for-dates' infant with signs of fetal malnutrition 2022   • Bipolar depression ([address]) 2022   • Rheumatoid arthritis of multiple sites with negative rheumatoid factor ([address]) 2022   • Closed fracture of multiple ribs of right side 2022   • Traumatic pneumothorax 2022   • Closed fracture of transverse process of lumbar vertebra ([address]) 2022   • Liver contusion 2022   • Fall 2022   • Acute pain due to trauma 2022   • Right shoulder pain 2022   • Severe protein-calorie malnutrition ([address]) 2022   • Undifferentiated connective tissue disease ([address]) 2021   • Primary generalized (osteo)arthritis 12/29/2021   • Peripheral neuropathy 12/29/2021   • Osteoporosis 12/29/2021   • Hx of compression fracture of spine 12/29/2021   • Lumbar spondylosis 12/29/2021   • Cervical spondylosis 12/29/2021   • Anemia 06/26/2021   • Chronic pain syndrome    • Mild intermittent asthma without complication    • Ambulatory dysfunction    • Seronegative arthropathy of multiple sites (HCC)    • Gastroesophageal reflux disease without esophagitis    • Mixed stress and urge urinary incontinence    • CHF (congestive heart failure) ([address])       LOS (days): 4  Geometric Mean LOS (GMLOS) (days): 3.80  Days to GMLOS:-0.2     OBJECTIVE:  Risk of Unplanned Readmission Score: 44.88         Current admission status: Inpatient   Preferred Pharmacy:   [address] [address]  Phone: [phone] Fax: [address] [facility], [address] [place] [address]  Phone: [phone] Fax: [phone]    Primary Care Provider: Morrie Leyden, DO    Primary Insurance: MEDICARE  Secondary Insurance: [facility]    DISCHARGE DETAILS:    Pt will d/c to [facility] tomorrow as the bed is available then. Pt will d/c into room 974. CM will await on d/c time.

## 2023-07-06 ENCOUNTER — APPOINTMENT (INPATIENT)
Dept: RADIOLOGY | Facility: HOSPITAL | Age: 68
DRG: 605 | End: 2023-07-06
Payer: MEDICARE

## 2023-07-06 LAB
2HR DELTA HS TROPONIN: -23 NG/L
4HR DELTA HS TROPONIN: -23 NG/L
ATRIAL RATE: 122 BPM
BASOPHILS # BLD AUTO: 0.02 THOUSANDS/ÂΜL (ref 0–0.1)
BASOPHILS NFR BLD AUTO: 0 % (ref 0–1)
CARDIAC TROPONIN I PNL SERPL HS: 16 NG/L
CARDIAC TROPONIN I PNL SERPL HS: 16 NG/L
CARDIAC TROPONIN I PNL SERPL HS: 39 NG/L
EOSINOPHIL # BLD AUTO: 0 THOUSAND/ÂΜL (ref 0–0.61)
EOSINOPHIL NFR BLD AUTO: 0 % (ref 0–6)
ERYTHROCYTE [DISTWIDTH] IN BLOOD BY AUTOMATED COUNT: 16.9 % (ref 11.6–15.1)
HCT VFR BLD AUTO: 26.4 % (ref 34.8–46.1)
HGB BLD-MCNC: 8.5 G/DL (ref 11.5–15.4)
IMM GRANULOCYTES # BLD AUTO: 0.03 THOUSAND/UL (ref 0–0.2)
IMM GRANULOCYTES NFR BLD AUTO: 1 % (ref 0–2)
LYMPHOCYTES # BLD AUTO: 1.07 THOUSANDS/ÂΜL (ref 0.6–4.47)
LYMPHOCYTES NFR BLD AUTO: 20 % (ref 14–44)
MCH RBC QN AUTO: 30.4 PG (ref 26.8–34.3)
MCHC RBC AUTO-ENTMCNC: 32.2 G/DL (ref 31.4–37.4)
MCV RBC AUTO: 94 FL (ref 82–98)
MONOCYTES # BLD AUTO: 0.62 THOUSAND/ÂΜL (ref 0.17–1.22)
MONOCYTES NFR BLD AUTO: 12 % (ref 4–12)
NEUTROPHILS # BLD AUTO: 3.57 THOUSANDS/ÂΜL (ref 1.85–7.62)
NEUTS SEG NFR BLD AUTO: 67 % (ref 43–75)
NRBC BLD AUTO-RTO: 0 /100 WBCS
PLATELET # BLD AUTO: 229 THOUSANDS/UL (ref 149–390)
PMV BLD AUTO: 10.1 FL (ref 8.9–12.7)
QRS AXIS: -11 DEGREES
QRSD INTERVAL: 100 MS
QT INTERVAL: 290 MS
QTC INTERVAL: 399 MS
RBC # BLD AUTO: 2.8 MILLION/UL (ref 3.81–5.12)
T WAVE AXIS: 192 DEGREES
VENTRICULAR RATE: 114 BPM
WBC # BLD AUTO: 5.31 THOUSAND/UL (ref 4.31–10.16)

## 2023-07-06 PROCEDURE — 97535 SELF CARE MNGMENT TRAINING: CPT

## 2023-07-06 PROCEDURE — 84484 ASSAY OF TROPONIN QUANT: CPT | Performed by: SURGERY

## 2023-07-06 PROCEDURE — 71045 X-RAY EXAM CHEST 1 VIEW: CPT

## 2023-07-06 PROCEDURE — 85025 COMPLETE CBC W/AUTO DIFF WBC: CPT | Performed by: PHYSICIAN ASSISTANT

## 2023-07-06 PROCEDURE — 97116 GAIT TRAINING THERAPY: CPT

## 2023-07-06 PROCEDURE — 97112 NEUROMUSCULAR REEDUCATION: CPT

## 2023-07-06 PROCEDURE — 97530 THERAPEUTIC ACTIVITIES: CPT

## 2023-07-06 PROCEDURE — 93005 ELECTROCARDIOGRAM TRACING: CPT

## 2023-07-06 PROCEDURE — 93010 ELECTROCARDIOGRAM REPORT: CPT | Performed by: INTERNAL MEDICINE

## 2023-07-06 PROCEDURE — 99232 SBSQ HOSP IP/OBS MODERATE 35: CPT | Performed by: STUDENT IN AN ORGANIZED HEALTH CARE EDUCATION/TRAINING PROGRAM

## 2023-07-06 RX ORDER — METOPROLOL SUCCINATE 25 MG/1
25 TABLET, EXTENDED RELEASE ORAL 2 TIMES DAILY
Status: DISCONTINUED | OUTPATIENT
Start: 2023-07-06 | End: 2023-07-07 | Stop reason: HOSPADM

## 2023-07-06 RX ADMIN — ACETAMINOPHEN 650 MG: 325 TABLET, FILM COATED ORAL at 21:10

## 2023-07-06 RX ADMIN — SPIRONOLACTONE 25 MG: 25 TABLET ORAL at 09:13

## 2023-07-06 RX ADMIN — BUPROPION HYDROCHLORIDE 100 MG: 100 TABLET, FILM COATED ORAL at 09:15

## 2023-07-06 RX ADMIN — ACETAMINOPHEN 650 MG: 325 TABLET, FILM COATED ORAL at 09:18

## 2023-07-06 RX ADMIN — HYDROXYCHLOROQUINE SULFATE 200 MG: 200 TABLET ORAL at 09:14

## 2023-07-06 RX ADMIN — DULOXETINE HYDROCHLORIDE 60 MG: 60 CAPSULE, DELAYED RELEASE ORAL at 17:06

## 2023-07-06 RX ADMIN — APIXABAN 5 MG: 5 TABLET, FILM COATED ORAL at 17:06

## 2023-07-06 RX ADMIN — GABAPENTIN 400 MG: 400 CAPSULE ORAL at 17:06

## 2023-07-06 RX ADMIN — PRAZOSIN HYDROCHLORIDE 1 MG: 1 CAPSULE ORAL at 21:10

## 2023-07-06 RX ADMIN — BUPROPION HYDROCHLORIDE 100 MG: 100 TABLET, FILM COATED ORAL at 17:07

## 2023-07-06 RX ADMIN — DULOXETINE HYDROCHLORIDE 60 MG: 60 CAPSULE, DELAYED RELEASE ORAL at 09:13

## 2023-07-06 RX ADMIN — METOPROLOL SUCCINATE 25 MG: 25 TABLET, EXTENDED RELEASE ORAL at 09:13

## 2023-07-06 RX ADMIN — MAGNESIUM OXIDE TAB 400 MG (241.3 MG ELEMENTAL MG) 400 MG: 400 (241.3 MG) TAB at 09:13

## 2023-07-06 RX ADMIN — MELATONIN TAB 3 MG 3 MG: 3 TAB at 21:10

## 2023-07-06 RX ADMIN — LORAZEPAM 0.25 MG: 0.5 TABLET ORAL at 17:06

## 2023-07-06 RX ADMIN — MAGNESIUM OXIDE TAB 400 MG (241.3 MG ELEMENTAL MG) 400 MG: 400 (241.3 MG) TAB at 17:06

## 2023-07-06 RX ADMIN — SENNOSIDES 8.6 MG: 8.6 TABLET, FILM COATED ORAL at 21:10

## 2023-07-06 RX ADMIN — CINACALCET 30 MG: 30 TABLET, FILM COATED ORAL at 09:18

## 2023-07-06 RX ADMIN — PANTOPRAZOLE SODIUM 40 MG: 40 TABLET, DELAYED RELEASE ORAL at 05:34

## 2023-07-06 RX ADMIN — METOPROLOL SUCCINATE 25 MG: 25 TABLET, FILM COATED, EXTENDED RELEASE ORAL at 21:10

## 2023-07-06 RX ADMIN — HYDROXYCHLOROQUINE SULFATE 200 MG: 200 TABLET ORAL at 17:07

## 2023-07-06 RX ADMIN — LAMOTRIGINE 150 MG: 100 TABLET ORAL at 21:10

## 2023-07-06 RX ADMIN — GABAPENTIN 400 MG: 400 CAPSULE ORAL at 09:13

## 2023-07-06 RX ADMIN — APIXABAN 5 MG: 5 TABLET, FILM COATED ORAL at 09:13

## 2023-07-06 NOTE — PLAN OF CARE
Problem: MOBILITY - ADULT  Goal: Maintain or return to baseline ADL function  Description: INTERVENTIONS:  -  Assess patient's ability to carry out ADLs; assess patient's baseline for ADL function and identify physical deficits which impact ability to perform ADLs (bathing, care of mouth/teeth, toileting, grooming, dressing, etc.)  - Assess/evaluate cause of self-care deficits   - Assess range of motion  - Assess patient's mobility; develop plan if impaired  - Assess patient's need for assistive devices and provide as appropriate  - Encourage maximum independence but intervene and supervise when necessary  - Involve family in performance of ADLs  - Assess for home care needs following discharge   - Consider OT consult to assist with ADL evaluation and planning for discharge  - Provide patient education as appropriate  Outcome: Progressing  Goal: Maintains/Returns to pre admission functional level  Description: INTERVENTIONS:  - Perform BMAT or MOVE assessment daily.   - Set and communicate daily mobility goal to care team and patient/family/caregiver. - Collaborate with rehabilitation services on mobility goals if consulted  - Perform Range of Motion  times a day. - Reposition patient every  hours.   - Dangle patient  times a day  - Stand patient  times a day  - Ambulate patient  times a day  - Out of bed to chair  times a day   - Out of bed for meals  times a day  - Out of bed for toileting  - Record patient progress and toleration of activity level   Outcome: Progressing     Problem: PAIN - ADULT  Goal: Verbalizes/displays adequate comfort level or baseline comfort level  Description: Interventions:  - Encourage patient to monitor pain and request assistance  - Assess pain using appropriate pain scale  - Administer analgesics based on type and severity of pain and evaluate response  - Implement non-pharmacological measures as appropriate and evaluate response  - Consider cultural and social influences on pain and pain management  - Notify physician/advanced practitioner if interventions unsuccessful or patient reports new pain  Outcome: Progressing     Problem: SAFETY ADULT  Goal: Maintain or return to baseline ADL function  Description: INTERVENTIONS:  -  Assess patient's ability to carry out ADLs; assess patient's baseline for ADL function and identify physical deficits which impact ability to perform ADLs (bathing, care of mouth/teeth, toileting, grooming, dressing, etc.)  - Assess/evaluate cause of self-care deficits   - Assess range of motion  - Assess patient's mobility; develop plan if impaired  - Assess patient's need for assistive devices and provide as appropriate  - Encourage maximum independence but intervene and supervise when necessary  - Involve family in performance of ADLs  - Assess for home care needs following discharge   - Consider OT consult to assist with ADL evaluation and planning for discharge  - Provide patient education as appropriate  Outcome: Progressing  Goal: Maintains/Returns to pre admission functional level  Description: INTERVENTIONS:  - Perform BMAT or MOVE assessment daily.   - Set and communicate daily mobility goal to care team and patient/family/caregiver. - Collaborate with rehabilitation services on mobility goals if consulted  - Perform Range of Motion  times a day. - Reposition patient every  hours.   - Dangle patient  times a day  - Stand patient  times a day  - Ambulate patient  times a day  - Out of bed to chair  times a day   - Out of bed for meals  times a day  - Out of bed for toileting  - Record patient progress and toleration of activity level   Outcome: Progressing  Goal: Patient will remain free of falls  Description: INTERVENTIONS:  - Educate patient/family on patient safety including physical limitations  - Instruct patient to call for assistance with activity   - Consult OT/PT to assist with strengthening/mobility   - Keep Call bell within reach  - Keep bed low and locked with side rails adjusted as appropriate  - Keep care items and personal belongings within reach  - Initiate and maintain comfort rounds  - Make Fall Risk Sign visible to staff  - Offer Toileting every  Hours, in advance of need  - Initiate/Maintain alarm  - Obtain necessary fall risk management equipment:   - Apply yellow socks and bracelet for high fall risk patients  - Consider moving patient to room near nurses station  Outcome: Progressing     Problem: Knowledge Deficit  Goal: Patient/family/caregiver demonstrates understanding of disease process, treatment plan, medications, and discharge instructions  Description: Complete learning assessment and assess knowledge base.   Interventions:  - Provide teaching at level of understanding  - Provide teaching via preferred learning methods  Outcome: Progressing

## 2023-07-06 NOTE — ASSESSMENT & PLAN NOTE
- Chronic dysphagia  - speech consult placed- dysphagia 1 diet with thin liquids  - patient is tolerating current diet

## 2023-07-06 NOTE — PROGRESS NOTES
[address] [facility]  Progress Note  Name: Abbie Pinto  MRN: [id number]  [facility] Date of Admission: 6/30/2023   Date of Service: 7/5/2023 I Hospital Day: 4    Assessment/Plan   * Leg hematoma, left, initial encounter  Assessment & Plan  - home Eliquis held on admission but did not attempt reversal due to recent stroke. Eliquis resumed on 7/3.   - Hgb with slow down-trend from 9 to mid 7s on 7/4 when repeat CT RLE with contrast was obtained and showed decreased size of hematoma with no active exravasation.   - Hgb stable at 7.4. No signs of ongoing bleeding clinically. - Hgb 6.8 on 7/2, 1 u pRBC ordered for transfusion  - F/u with trauma in 2 weeks and continue Eliquis on discharge.      Stroke [facility])  Assessment & Plan  - R MCA stroke s/p mechanical thrombectomy in February 2023 with residual L sided weakness, dysphasia and dysphagia  - Eliquis resumed 7/3   - continue to follow up with neurology as an outpatient  - continue PT/OT and speech therapy outpatient    Dysphagia  Assessment & Plan  - Chronic dysphagia  - speech consult placed- dysphagia 1 diet with thin liquids  - patient is tolerating current diet    Hypercalcemia  Assessment & Plan  - Continue home cinacalcet   - outpatient f/u with PCP    CHF (congestive heart failure) ([address])  Assessment & Plan  Wt Readings from Last 3 Encounters:   06/30/23 55.3 kg (122 lb)   06/06/23 47.9 kg (105 lb 9.6 oz)   05/01/23 56.8 kg (125 lb 3.2 oz)     - ECHO 2/2023 with EF 30%, Grade II diastolic dysfunction, moderate aortic regurgitation  - continue home metoprolol and aldactone  - no evidence of acute exacerbation at this time  - f/u with heart failure team as an outpatient as previously scheduled             Bowel Regimen: senna  VTE Prophylaxis:Sequential compression device (Venodyne)  and Reason for no pharmacologic prophylaxis Apixiban     Disposition: continue med-surg status, D/C to [facility] on 7/6    Subjective Chief Complaint: "I'm having pain in my leg when I move"    Subjective: Patient notes pain in the left leg hematoma , worse with movement. Overall she does not feel that the pain is worse or that the hematoma is expanding. Discussed the healing process of large hematomas. She denies dizziness/lightheadedness/CP/SOB. Objective   Vitals:   Temp:  [97.4 °F (36.3 °C)-98.5 °F (36.9 °C)] 98.1 °F (36.7 °C)  HR:  [99] 99  Resp:  [18] 18  BP: (106-139)/(62-66) 106/66    I/O       07/04 0701 07/05 0700 07/05 0701 07/06 0700    P. O. 240     I.V. (mL/kg) 1800 (32.5)     Total Intake(mL/kg) 2040 (36.9)     Urine (mL/kg/hr)      Total Output      Net +2040           Unmeasured Urine Occurrence 4 x     Unmeasured Stool Occurrence 1 x            Physical Exam:   GENERAL APPEARANCE: NAD  NEURO: GCS 15, + LUE weakness. Strength 5/5 RUE and B/L LE. Dysphagia noted. HEENT: NCAT  CV: RRR, no MGR  LUNGS: CTA bilaterally  GI: soft,non-tender,non-distended  : voiding  MSK: + L thigh hematoma, soft, stable with ecchymosis noted; + NVI distally in LLE. SKIN: pink, warm, dry    Invasive Devices     Peripheral Intravenous Line  Duration           Long-Dwell Peripheral IV (Midline) 97/58/01 Right Basilic 2 days                      Lab Results:   Results: I have personally reviewed all pertinent laboratory/tests results, BMP/CMP:   Lab Results   Component Value Date    SODIUM 144 07/05/2023    K 3.6 07/05/2023     (H) 07/05/2023    CO2 25 07/05/2023    BUN 7 07/05/2023    CREATININE 0.72 07/05/2023    CALCIUM 7.5 (L) 07/05/2023    EGFR 85 07/05/2023    and CBC:   Lab Results   Component Value Date    WBC 4.34 07/05/2023    HGB 7.4 (L) 07/05/2023    HCT 22.5 (L) 07/05/2023    MCV 94 07/05/2023     07/05/2023    RBC 2.39 (L) 07/05/2023    MCH 31.0 07/05/2023    MCHC 32.9 07/05/2023    RDW 15.5 (H) 07/05/2023    MPV 10.3 07/05/2023    NRBC 0 07/05/2023     Imaging: I have personally reviewed pertinent reports.      Other Studies: no new

## 2023-07-06 NOTE — ASSESSMENT & PLAN NOTE
- home Eliquis held on admission but did not attempt reversal due to recent stroke. Eliquis resumed on 7/3.   - Hgb with slow down-trend from 9 to mid 7s on 7/4 when repeat CT RLE with contrast was obtained and showed decreased size of hematoma with no active exravasation.   - Hgb stable at 8.5. No signs of ongoing bleeding clinically. - Hgb 6.8 on 7/2, 1 u pRBC ordered for transfusion  - F/u with trauma in 2 weeks and continue Eliquis on discharge.

## 2023-07-06 NOTE — ASSESSMENT & PLAN NOTE
Wt Readings from Last 3 Encounters:   06/30/23 55.3 kg (122 lb)   06/06/23 47.9 kg (105 lb 9.6 oz)   05/01/23 56.8 kg (125 lb 3.2 oz)     - ECHO 2/2023 with EF 30%, Grade II diastolic dysfunction, moderate aortic regurgitation  - continue home metoprolol and aldactone  - no evidence of acute exacerbation at this time  - f/u with heart failure team as an outpatient as previously scheduled

## 2023-07-06 NOTE — PLAN OF CARE
Problem: OCCUPATIONAL THERAPY ADULT  Goal: Performs self-care activities at highest level of function for planned discharge setting. See evaluation for individualized goals. Description: Treatment Interventions: ADL retraining, Functional transfer training, UE strengthening/ROM, Endurance training, Cognitive reorientation, Patient/family training, Equipment evaluation/education, Compensatory technique education, Activityengagement          See flowsheet documentation for full assessment, interventions and recommendations. Outcome: Progressing  Note: Limitation: Decreased ADL status, Decreased UE ROM, Decreased Safe judgement during ADL, Decreased cognition, Decreased endurance, Decreased self-care trans, Decreased high-level ADLs  Prognosis: Good, Fair  Assessment: Pt seen for participation in Occupational Therapy session with focus on activity tolerance, bed mob, functional transfers/mob, sitting balance and tolerance and standing tolerance and balance for pt engagement in UB/LB self-care tasks and energy conservation techniques. Pt cleared by JULISA/Lynne for pt participated in OT session. Pt presented supine/HOB raised pt awake/alert and agreeable to participate in therapy following pt identifiers confirmed. Pt reported her therapy goal to  Go to rehab. Pt required assist for bed mob, functional transfers/mob and UB/LB self-caret 2* pt decondition. Pt will require post acute rehab service to continue to address these above noted pt deficit which currently impair pt ADL and functional mob.  Pt set up to bedside chair post session, chair alarm activated and all needs within pt reach     OT Discharge Recommendation: Post acute rehabilitation services

## 2023-07-06 NOTE — ASSESSMENT & PLAN NOTE
Wt Readings from Last 3 Encounters:   06/30/23 55.3 kg (122 lb)   06/06/23 47.9 kg (105 lb 9.6 oz)   05/01/23 56.8 kg (125 lb 3.2 oz)     - ECHO 2/2023 with EF 30%, Grade II diastolic dysfunction, moderate aortic regurgitation  - continue home metoprolol and aldactone. Increase metoprolol succinate to home dose 25 mg BID.    - no evidence of acute exacerbation at this time  - f/u with heart failure team as an outpatient as previously scheduled

## 2023-07-06 NOTE — ASSESSMENT & PLAN NOTE
- R MCA stroke s/p mechanical thrombectomy in February 2023 with residual L sided weakness, dysphasia and dysphagia  - Eliquis resumed 7/3   - continue to follow up with neurology as an outpatient  - continue PT/OT and speech therapy outpatient

## 2023-07-06 NOTE — PLAN OF CARE
Problem: PHYSICAL THERAPY ADULT  Goal: Performs mobility at highest level of function for planned discharge setting. See evaluation for individualized goals. Description: Treatment/Interventions: Functional transfer training, LE strengthening/ROM, Therapeutic exercise, Endurance training, Patient/family training, Equipment eval/education, Bed mobility, Gait training, OT, Spoke to nursing  Equipment Recommended: Liliakirstie Kris       See flowsheet documentation for full assessment, interventions and recommendations. Outcome: Progressing  Note: Prognosis: Fair  Problem List: Decreased strength, Decreased endurance, Impaired balance, Decreased mobility, Pain, Decreased skin integrity  Assessment: Pt demonstrated improved functional transfer status this session, requiring less physical assist to perform all transfers today, but does demonstrate inconsistant awareness at times, requiring instructions for hand placement as noted above when standing at sink. She maintains balance in both sitting & standing without incident, but requires assist in standing for guarding, and braces with LEs against bedside recliner upon standing for extended duration when performing ADLs with OT at sink. She particiapted in short distance ambulation, but reports complaints of SOB, with HR observed to be 130-140s immediately upon sitting, dropping to 110-122 at rest.  Pt denies complaints regarding the same in sitting during session. Anticipate that as long as subjective symptoms improve & vitals remain stable, pt will demonstrate improved endurance in upcoming sessions. PT POC & d/c recommendations remain appropriate at this time to safely progress to highest level of functional independence prior to transitioning home. PT Discharge Recommendation: Post acute rehabilitation services    See flowsheet documentation for full assessment.

## 2023-07-06 NOTE — PHYSICAL THERAPY NOTE
Physical Therapy Progress Note     07/06/23 0914   PT Last Visit   PT Visit Date 07/06/23   Note Type   Note Type Treatment for insurance authorization   Pain Assessment   Pain Assessment Tool 0-10   Pain Score 6   Pain Location/Orientation Orientation: Left; Location: Hip;Location: Leg   Restrictions/Precautions   Other Precautions Contact/isolation;Pain; Fall Risk;Cognitive; Chair Alarm; Bed Alarm   Subjective   Subjective Pt encountered supine in bed, pleasant and agreeable to treatment. Reports pain in L hip/thigh at bruising site as well as BLE cramping while sitting EOB while performing marching, which resided with rest.  She reports SOB during 1st ambulation trial, but not during 2nd. Is impulsive at times, but easily directed to safe task management. She requires cues for RW proximity & looking at tasks she is performing, including reaching for sink to support herself when static standing at conclusion of session. Bed Mobility   Supine to Sit 4  Minimal assistance   Additional items Assist x 1; Increased time required;HOB elevated;LE management   Transfers   Sit to Stand 4  Minimal assistance   Additional items Assist x 1; Armrests; Increased time required;Verbal cues; Impulsive   Stand to Sit 4  Minimal assistance   Additional items Assist x 1; Armrests; Increased time required   Ambulation/Elevation   Gait pattern Short stride; Foward flexed; Shuffling;Decreased foot clearance;Narrow AYLA; Improper Weight shift   Gait Assistance 4  Minimal assist   Additional items Assist x 1   Assistive Device Rolling walker   Distance 15' x 2   Balance   Static Sitting Fair   Static Standing Poor +   Ambulatory Poor +   Activity Tolerance   Activity Tolerance Patient tolerated treatment well;Patient limited by fatigue;Patient limited by pain   Nurse [address], RN   Assessment   Prognosis Fair   Problem List Decreased strength;Decreased endurance; Impaired balance;Decreased mobility;Pain;Decreased skin integrity Assessment Pt demonstrated improved functional transfer status this session, requiring less physical assist to perform all transfers today, but does demonstrate inconsistant awareness at times, requiring instructions for hand placement as noted above when standing at sink. She maintains balance in both sitting & standing without incident, but requires assist in standing for guarding, and braces with LEs against bedside recliner upon standing for extended duration when performing ADLs with OT at sink. She particiapted in short distance ambulation, but reports complaints of SOB, with HR observed to be 130-140s immediately upon sitting, dropping to 110-122 at rest.  Pt denies complaints regarding the same in sitting during session. Anticipate that as long as subjective symptoms improve & vitals remain stable, pt will demonstrate improved endurance in upcoming sessions. PT POC & d/c recommendations remain appropriate at this time to safely progress to highest level of functional independence prior to transitioning home. Goals   Patient Goals to go to rehab today   LTG Expiration Date 07/16/23   PT Treatment Day 1   Plan   Treatment/Interventions Functional transfer training;LE strengthening/ROM; Elevations; Therapeutic exercise; Endurance training;Patient/family training;Equipment eval/education; Bed mobility;Gait training   PT Frequency 2-3x/wk   Recommendation   PT Discharge Recommendation Post acute rehabilitation services   Equipment Recommended [facility] Recommended Wheeled walker   AM-PAC Basic Mobility Inpatient   Turning in Flat Bed Without Bedrails 4   Lying on Back to Sitting on Edge of Flat Bed Without Bedrails 3   Moving Bed to Chair 3   Standing Up From Chair Using Arms 3   Walk in Room 2   Climb 3-5 Stairs With Railing 1   Basic Mobility Inpatient Raw Score 16   Basic Mobility Standardized Score 38.32   Highest Level Of Mobility   JH-HLM Goal 5: Stand one or more mins   JH-HLM Achieved 7: Walk 25 feet or more       Nolberto Holm PTA    An [facility] Basic Mobility Raw Score less than 17 suggests pt would benefit from post acute rehab. Please also refer to the recommendation of the Physical Therapist for safe discharge planning.

## 2023-07-06 NOTE — OCCUPATIONAL THERAPY NOTE
Occupational Therapy Treatment Note     07/06/23 0913   OT Last Visit   OT Visit Date 07/06/23   Note Type   Note Type Treatment for insurance authorization   Pain Assessment   Pain Assessment Tool 0-10   Pain Score 6   Pain Location/Orientation Location: Leg;Orientation: Bilateral   Restrictions/Precautions   Weight Bearing Precautions Per Order No   Braces or Orthoses   (none)   Other Precautions Contact/isolation;O2;Fall Risk;Pain   Lifestyle   Autonomy I basic adls and mobility however +falls - family assists with iadls   Reciprocal Relationships supportive family   Service to Others retired   Intrinsic Gratification sedentary   ADL   Where Assessed Sitting at [place] [id number]  Supervision/Setup   Grooming Deficit Setup; Increased time to complete;Wash/dry hands; Wash/dry face   UB Bathing Assistance 4  Minimal Assistance   UB Bathing Deficit Setup; Increased time to complete; Chest;Right arm;Left arm; Abdomen   LB Bathing Assistance 3  Moderate Assistance   LB Bathing Deficit Buttocks;Right lower leg including foot; Left lower leg including foot   UB Dressing Assistance 3  Moderate Assistance   UB Dressing Deficit Increased time to complete; Thread RUE; Thread LUE;Pull around back   LB Dressing Assistance 2  Maximal Assistance   LB Dressing Deficit Don/doff R sock; Don/doff L sock   Bed Mobility   Supine to Sit 4  Minimal assistance   Additional items Assist x 1   Transfers   Sit to Stand 4  Minimal assistance   Additional items Assist x 1   Stand to Sit 4  Minimal assistance   Additional items Assist x 1   Stand pivot 4  Minimal assistance   Additional items Assist x 1   Functional Mobility   Functional Mobility 4  Minimal assistance   Additional Comments with frequent rest breaks /pt HR increases with activity   Additional items Rolling walker   Subjective   Subjective pt stated "yeah, I don't pay attention" sometimes   Cognition   Overall Cognitive Status Impaired   Arousal/Participation Arousable; Cooperative;Persistent stimuli required   Attention Attends with cues to redirect   Orientation Level Oriented X4   Memory Decreased recall of recent events;Decreased short term memory;Decreased recall of precautions   Following Commands Follows one step commands with increased time or repetition   Activity Tolerance   Activity Tolerance Patient tolerated treatment well   Assessment   Assessment Pt seen for participation in Occupational Therapy session with focus on activity tolerance, bed mob, functional transfers/mob, sitting balance and tolerance and standing tolerance and balance for pt engagement in UB/LB self-care tasks and energy conservation techniques. Pt cleared by JULISA/Lynne for pt participated in OT session. Pt presented supine/HOB raised pt awake/alert and agreeable to participate in therapy following pt identifiers confirmed. Pt reported her therapy goal to  Go to rehab. Pt required assist for bed mob, functional transfers/mob and UB/LB self-caret 2* pt decondition. Pt will require post acute rehab service to continue to address these above noted pt deficit which currently impair pt ADL and functional mob.  Pt set up to bedside chair post session, chair alarm activated and all needs within pt reach   Plan   Treatment Interventions ADL retraining   Goal Expiration Date 07/16/23   OT Treatment Day 1   OT Frequency 2-3x/wk   Recommendation   OT Discharge Recommendation Post acute rehabilitation services   AM-PAC Daily Activity Inpatient   Lower Body Dressing 2   Bathing 2   Toileting 2   Upper Body Dressing 2   Grooming 3   Eating 4   Daily Activity Raw Score 15   Daily Activity Standardized Score (Calc for Raw Score >=11) 34.69   AM-PAC Applied Cognition Inpatient   Following a Speech/Presentation 2   Understanding Ordinary Conversation 3   Taking Medications 2   Remembering Where Things Are Placed or Put Away 3   Remembering List of 4-5 Errands 2   Taking Care of Complicated Tasks 2   Applied Cognition Raw Score 14   Applied Cognition Standardized Score 32.02   Barthel Index   Feeding 5   Bathing 0   Grooming Score 5   Dressing Score 5   Bladder Score 10   Bowels Score 10   Toilet Use Score 5   Transfers (Bed/Chair) Score 5   Mobility (Level Surface) Score 0   Stairs Score 0   Barthel Index Score 45       Anjelica SILVERMAN/CARMELO

## 2023-07-06 NOTE — ASSESSMENT & PLAN NOTE
- home Eliquis held on admission but did not attempt reversal due to recent stroke. Eliquis resumed on 7/3.   - Hgb with slow down-trend from 9 to mid 7s on 7/4 when repeat CT RLE with contrast was obtained and showed decreased size of hematoma with no active exravasation.   - Hgb stable at 7.4. No signs of ongoing bleeding clinically. - Hgb 6.8 on 7/2, 1 u pRBC ordered for transfusion  - F/u with trauma in 2 weeks and continue Eliquis on discharge.

## 2023-07-06 NOTE — PROGRESS NOTES
PHYSICAL MEDICINE AND REHABILITATION   PREADMISSION ASSESSMENT     Projected [facility] Diagnoses:  Impairment of mobility, safety and Activities of Daily Living (ADLs) due to Debility:  16  Debility (Non-cardiac/Non-pulmonary)  Etiologic: Left leg/left hip hematoma with evidence of active extravasation  Date of Onset: 6/30/2023   Date of surgery: N/A    PATIENT INFORMATION  Name: Gregorio Domingo Phone #: [phone] (home)   Address: [address] [place] [address]  YOB: 1955 Age: 70 y.o. #   Marital Status:   Ethnicity:Not  or   Employment Status: retired  Extended Emergency Contact Information  Primary Emergency Contact: [employer] of [address] Phone: [phone]  Relation: Daughter  Secondary Emergency Contact: ROMANA KESSLER  [employer] Phone: [phone]  Relation: Son  Advance Directive: Code: Level 1- Full Code (no ACP docs)    INSURANCE/COVERAGE:     Primary Payor: MEDICARE / Plan: MEDICARE A AND B / Product Type: Medicare A & B Fee for Service /   [facility]  ID# [id number]   Payer Contact:  Payer Contact:   Contact Phone:  Contact Phone:       MEDICARE #: [id number]  Medicare Days: 15/30/60  Medical Record #: [id number]    REFERRAL SOURCE:   Referring provider: Joel Machado MD  Referring facility: [address] [place] [address]  Room: [address] [place] [address]  PCP: Claudene Eden, DO PCP phone number: [phone]    MEDICAL INFORMATION  HPI: Pt is a 70year old female w PMH HTN, hx of CVA on Eliquis, CHF, GERD, SHAR, gout, bipolar depression, RA, presented as a trauma transfer from [facility] after mechanical fall down her front porch stairs. Found to have left leg/left hip hematoma with evidence of active extravasation on CT imaging.  Patient is poor historian and not able to give much detail of the event but apparently this morning had gotten up thinking she was opening the door to the bathroom when she opened her front door and fell down the front porch stairs, hit and landed on her left hip/left side with immediate pain to the area, she does not think she hit her head, denies loss of consciousness, does take Eliquis for history of recent stroke in February 2023.  Patient apparently had hypotension with systolics in the 28U in triage at [address] but has since normalized.  Also of note patient has history of difficulty with ambulation and multiple prior falls with multiple prior/healing injuries seen on imaging. Binder present over hips for tamponade of hematoma, continue to monitor left lower extremity exam specifically thigh compartment but now been discontinued. Hgb 6.8 on 7/2, 1 u pRBC ordered for transfusion. Hgb 7/3 was 9.5 post transfusion. Patient also with chronic dysphagia. Speech consult placed- dysphagia 1 diet. Repeat VBS later this week. Patient had a fall morning of 7/4. CTA of left hip negative for any new findings/worsening hematoma. Hgb the morning of 7/5  7.4 (was 9.5 and 9.0 yesterday 7/4). PT,OT, ST have been consulted and are recommending post-acute rehab services. Patient's case has been reviewed with [facility] medical director, patient meets medical criteria for acute rehab and has demonstrated the ability to tolerate three or more hours of therapy per day. Patient is medically stable and ready for discharge to the [facility]. Moderna vaccines 3/30/21, 5/12/21; Covid test negative 7/5/23    Past Medical History:   Past Surgical History:    Allergies:     Past Medical History:   Diagnosis Date   • Anxiety    • Asthma    • Bipolar depression ([address])    • Chronic narcotic dependence ([address])    • Chronic pain    • Depression    • Enterovirus heart infection    • SHAR (obstructive sleep apnea)    • Osteoarthritis    • Peripheral neuropathy    • Plantar fasciitis of right foot    • Senile osteoporosis    • Seronegative arthropathy of multiple sites [facility])    • Undifferentiated connective tissue disease [facility])     Past Surgical History:   Procedure Laterality Date   • BREAST IMPLANT     • FIXATION KYPHOPLASTY LUMBAR SPINE     • HYSTERECTOMY     • INCONTINENCE SURGERY N/A    • IR STROKE ALERT  2/12/2023   • NASAL SEPTOPLASTY W/ TURBINOPLASTY N/A    • RECTAL PROLAPSE REPAIR N/A    • REPAIR RECTOCELE     • RHINOPLASTY N/A      Allergies   Allergen Reactions   • Cephalosporins Other (See Comments)     rash   • Iodine - Food Allergy Anaphylaxis     Anaphylaxis     • Methocarbamol Hives   • Sulfa Antibiotics Hives   • Bactrim [Sulfamethoxazole-Trimethoprim] Hives   • Erythromycin Hives   • Penicillins    • Shrimp Extract Allergy Skin Test - Food Allergy Other (See Comments)   • Trimethoprim Hives         Medical/functional conditions requiring inpatient rehabilitation: Left leg hematoma, Dysphagia, Decreased self care, Decreased mobility, Decreased strength/endurance     Risk for medical/clinical complications: Risk for falls, Risk for skin breakdown secondary to decreased mobility, Risk for pain      Comorbidities: CHF, Bipolar depression, stroke, hypercalcemia, dysphagia    Surgeries in the last 100 days: none    CURRENT VITAL SIGNS:   Temp:  [96.5 °F (35.8 °C)-98 °F (36.7 °C)] 97.6 °F (36.4 °C)  HR:  [] 84  Resp:  [18-22] 18  BP: (110-130)/(66-81) 116/69   Intake/Output Summary (Last 24 hours) at 7/7/2023 0848  Last data filed at 7/6/2023 2253  Gross per 24 hour   Intake 178 ml   Output --   Net 178 ml        LABORATORY RESULTS:      Lab Results   Component Value Date    HGB 8.5 (L) 07/06/2023    HCT 26.4 (L) 07/06/2023    WBC 5.31 07/06/2023     Lab Results   Component Value Date    BUN 7 07/05/2023    K 3.6 07/05/2023     (H) 07/05/2023    GLUCOSE 87 11/17/2016    CREATININE 0.72 07/05/2023     Lab Results   Component Value Date    PROTIME 16.7 (H) 07/01/2023    INR 1.33 (H) 07/01/2023        DIAGNOSTIC STUDIES:  CTA pelvis w wo contrast    Result Date: 7/4/2023  Impression: 1.   Decreasing size of left lateral thigh hematoma without evidence for active extravasation. 2.  No CT findings in the visualized pelvis and thighs to explain falling hematocrit. 3.  Pancolitis with increasing inflammatory free fluid in the pelvis. No free air, pneumatosis, or encapsulated collections demonstrated. The study was marked in [facility] for immediate notification. Workstation performed: [id number]     XR chest 1 view portable    Result Date: 7/2/2023  Impression: No acute cardiopulmonary disease. Workstation performed: [id number]     CT head without contrast    Result Date: 6/30/2023  Impression: No acute intracranial pathology. Stable chronic right MCA infarct. Workstation performed: [id number]     CT cervical spine without contrast    Result Date: 6/30/2023  Impression: No cervical spine fracture or traumatic malalignment. Workstation performed: [id number]     CT chest abdomen pelvis w contrast    Result Date: 6/30/2023  Impression: Large hematoma in the lateral left thigh measuring up to 9.6 cm with focus of active contrast extravasation. No acute intrathoracic or intra-abdominal injury.  I personally discussed this study with Asael on 6/30/2023 3:37 PM. Workstation performed: [id number]       PRECAUTIONS/SPECIAL NEEDS:  Isolation Precautions:  Contact, Anticoagulation:  Eliquis, Pain Management and Aspiration Risk/Precautions    MEDICATIONS:     Current Facility-Administered Medications:   •  acetaminophen (TYLENOL) tablet 650 mg, 650 mg, Oral, Q6H [address], Yesenia Koenig MD, 650 mg at 07/07/23 2552  •  albuterol (PROVENTIL HFA,VENTOLIN HFA) inhaler 2 puff, 2 puff, Inhalation, Q6H PRN, Yesenia Koenig MD  •  apixaban Geraldine Codding) tablet 5 mg, 5 mg, Oral, BID, Thedore Bumpers, MD, 5 mg at 07/07/23 2580  •  buPROPion [facility]) tablet 100 mg, 100 mg, Oral, BID, Yesenia Koenig MD, 100 mg at 07/07/23 9601  •  cinacalcet (SENSIPAR) tablet 30 mg, 30 mg, Oral, Daily With Breakfast, Yesenia Koenig MD, 30 mg at 07/07/23 0734  •  diphenhydrAMINE (BENADRYL) injection 50 mg, 50 mg, Intravenous, Q6H PRN, Luna Steinberg MD  •  DULoxetine (CYMBALTA) delayed release capsule 60 mg, 60 mg, Oral, BID, Surekha Bauer MD, 60 mg at 07/07/23 5533  •  EPINEPHrine PF (ADRENALIN) 1 mg/mL injection 0.5 mg, 0.5 mg, Intramuscular, PRN, Luna Steinberg MD  •  gabapentin (NEURONTIN) capsule 400 mg, 400 mg, Oral, BID, Carolyn Pryor PA-C, 400 mg at 07/07/23 0783  •  hydroxychloroquine (PLAQUENIL) tablet 200 mg, 200 mg, Oral, BID With Meals, Surekha Bauer MD, 200 mg at 07/07/23 1275  •  labetalol (NORMODYNE) injection 10 mg, 10 mg, Intravenous, Q6H PRN, Surekha Bauer MD  •  lamoTRIgine (LaMICtal) tablet 150 mg, 150 mg, Oral, HS, Surekha Bauer MD, 150 mg at 07/06/23 2110  •  LORazepam (ATIVAN) tablet 0.25 mg, 0.25 mg, Oral, Q12H PRN, Boubacar Lopez MD, 0.25 mg at 07/06/23 1706  •  magnesium Oxide (MAG-OX) tablet 400 mg, 400 mg, Oral, BID, Carolyn Pryor PA-C, 400 mg at 07/07/23 6890  •  melatonin tablet 3 mg, 3 mg, Oral, HS, Hreberth Livingston PA-C, 3 mg at 07/06/23 2110  •  metoprolol succinate (TOPROL-XL) 24 hr tablet 25 mg, 25 mg, Oral, BID, Carolyn Pryor PA-C, 25 mg at 07/07/23 5847  •  ondansetron [facility]) injection 4 mg, 4 mg, Intravenous, Q6H PRN, Surekha Bauer MD, 4 mg at 07/03/23 5389  •  oxyCODONE (ROXICODONE) IR tablet 5 mg, 5 mg, Oral, Q4H PRN, Surekha Bauer MD, 5 mg at 07/05/23 1737  •  oxyCODONE (ROXICODONE) split tablet 2.5 mg, 2.5 mg, Oral, Q4H PRN, Surekha Bauer MD, 2.5 mg at 07/02/23 0347  •  pantoprazole (PROTONIX) EC tablet 40 mg, 40 mg, Oral, Early Morning, Surekha Bauer MD, 40 mg at 07/07/23 0543  •  prazosin (MINIPRESS) capsule 1 mg, 1 mg, Oral, HS, Surekha Bauer MD, 1 mg at 07/06/23 2110  •  senna (SENOKOT) tablet 8.6 mg, 1 tablet, Oral, HS, Maggi Michael MD, 8.6 mg at 07/06/23 2110  •  spironolactone (ALDACTONE) tablet 25 mg, 25 mg, Oral, Daily, Carolyn Garza OWEN Pryor, 25 mg at 07/07/23 3662    SKIN INTEGRITY:   Hematoma left hip, scattered bruising, abrasion to R 5th toe    PRIOR LEVEL OF FUNCTION:  She lives in a(n) single family home  Niesha Dunn is  and lives with their family. Self Care: Independent, Indoor Mobility: Mod I with RW, Stairs (in/outdoor): Mod I with RW and Cognition: Independent    Patient lives in a 2-story house w/ 1 TAB with her son and brother, +1 step down to the bathroom. Son and his girlfriend assist the patient at home as needed. Home all the time.       FALLS IN THE LAST 6 MONTHS: 1-4    HOME ENVIRONMENT:  The living area: can live on one level  There are 1 step to enter the home. The patient will have 24 hour supervision/physical assistance available upon discharge. PREVIOUS DME:  Equipment in home (previous DME): Rolling Walker    FUNCTIONAL STATUS:  Physical Therapy Occupational Therapy Speech Therapy   07/06/23 0914    PT Last Visit   PT Visit Date 07/06/23   Note Type   Note Type Treatment for insurance authorization   Pain Assessment   Pain Assessment Tool 0-10   Pain Score 6   Pain Location/Orientation Orientation: Left; Location: Hip;Location: Leg   Restrictions/Precautions   Other Precautions Contact/isolation;Pain; Fall Risk;Cognitive; Chair Alarm; Bed Alarm   Subjective   Subjective Pt encountered supine in bed, pleasant and agreeable to treatment. Reports pain in L hip/thigh at bruising site as well as BLE cramping while sitting EOB while performing marching, which resided with rest.  She reports SOB during 1st ambulation trial, but not during 2nd. Is impulsive at times, but easily directed to safe task management. She requires cues for RW proximity & looking at tasks she is performing, including reaching for sink to support herself when static standing at conclusion of session.    Bed Mobility   Supine to Sit 4  Minimal assistance   Additional items Assist x 1;Increased time required;HOB elevated;LE management   Transfers   Sit to Stand 4  Minimal assistance   Additional items Assist x 1; Armrests; Increased time required;Verbal cues; Impulsive   Stand to Sit 4  Minimal assistance   Additional items Assist x 1; Armrests; Increased time required   Ambulation/Elevation   Gait pattern Short stride; Foward flexed; Shuffling;Decreased foot clearance;Narrow AYLA; Improper Weight shift   Gait Assistance 4  Minimal assist   Additional items Assist x 1   Assistive Device Rolling walker   Distance 15' x 2   Balance   Static Sitting Fair   Static Standing Poor +   Ambulatory Poor +   Activity Tolerance   Activity Tolerance Patient tolerated treatment well;Patient limited by fatigue;Patient limited by pain   Nurse [facility], RN   Assessment   Prognosis Fair   Problem List Decreased strength;Decreased endurance; Impaired balance;Decreased mobility;Pain;Decreased skin integrity   Assessment Pt demonstrated improved functional transfer status this session, requiring less physical assist to perform all transfers today, but does demonstrate inconsistant awareness at times, requiring instructions for hand placement as noted above when standing at sink. She maintains balance in both sitting & standing without incident, but requires assist in standing for guarding, and braces with LEs against bedside recliner upon standing for extended duration when performing ADLs with OT at sink. She particiapted in short distance ambulation, but reports complaints of SOB, with HR observed to be 130-140s immediately upon sitting, dropping to 110-122 at rest.  Pt denies complaints regarding the same in sitting during session. Anticipate that as long as subjective symptoms improve & vitals remain stable, pt will demonstrate improved endurance in upcoming sessions.   PT POC & d/c recommendations remain appropriate at this time to safely progress to highest level of functional independence prior to transitioning home. 07/06/23 0913    OT Last Visit   OT Visit Date 07/06/23   Note Type   Note Type Treatment for insurance authorization   Pain Assessment   Pain Assessment Tool 0-10   Pain Score 6   Pain Location/Orientation Location: Leg;Orientation: Bilateral   Restrictions/Precautions   Weight Bearing Precautions Per Order No   Braces or Orthoses    (none)   Other Precautions Contact/isolation;O2;Fall Risk;Pain   Lifestyle   Autonomy I basic adls and mobility however +falls - family assists with iadls   Reciprocal Relationships supportive family   Service to Others retired   Intrinsic Gratification sedentary   ADL   Where Assessed Sitting at [place] [address]  Supervision/Setup   Grooming Deficit Setup; Increased time to complete;Wash/dry hands; Wash/dry face   UB Bathing Assistance 4  Minimal Assistance   UB Bathing Deficit Setup; Increased time to complete; Chest;Right arm;Left arm; Abdomen   LB Bathing Assistance 3  Moderate Assistance   LB Bathing Deficit Buttocks;Right lower leg including foot; Left lower leg including foot   UB Dressing Assistance 3  Moderate Assistance   UB Dressing Deficit Increased time to complete; Thread RUE; Thread LUE;Pull around back   LB Dressing Assistance 2  Maximal Assistance   LB Dressing Deficit Don/doff R sock; Don/doff L sock   Bed Mobility   Supine to Sit 4  Minimal assistance   Additional items Assist x 1   Transfers   Sit to Stand 4  Minimal assistance   Additional items Assist x 1   Stand to Sit 4  Minimal assistance   Additional items Assist x 1   Stand pivot 4  Minimal assistance   Additional items Assist x 1   Functional Mobility   Functional Mobility 4  Minimal assistance   Additional Comments with frequent rest breaks /pt HR increases with activity   Additional items Rolling walker   Subjective   Subjective pt stated "yeah, I don't pay attention" sometimes   Cognition   Overall Cognitive Status Impaired   Arousal/Participation Arousable; Cooperative;Persistent stimuli required   Attention Attends with cues to redirect   Orientation Level Oriented X4   Memory Decreased recall of recent events;Decreased short term memory;Decreased recall of precautions   Following Commands Follows one step commands with increased time or repetition   Activity Tolerance   Activity Tolerance Patient tolerated treatment well   Assessment   Assessment Pt seen for participation in Occupational Therapy session with focus on activity tolerance, bed mob, functional transfers/mob, sitting balance and tolerance and standing tolerance and balance for pt engagement in UB/LB self-care tasks and energy conservation techniques. Pt cleared by JULISA/Lynne for pt participated in OT session. Pt presented supine/HOB raised pt awake/alert and agreeable to participate in therapy following pt identifiers confirmed. Pt reported her therapy goal to  Go to rehab. Pt required assist for bed mob, functional transfers/mob and UB/LB self-caret 2* pt decondition. Pt will require post acute rehab service to continue to address these above noted pt deficit which currently impair pt ADL and functional mob. Pt set up to bedside chair post session, chair alarm activated and all needs within pt reach           CARE SCORES:  Self Care:  Eatin: Partial/moderate assistance  Oral hygiene: 04: Supervision or touching  assistance  Toilet hygiene: 03: Partial/moderate assistance  Shower/bathing self: 03: Partial/moderate assistance  Upper body dressin: Partial/moderate assistance  Lower body dressin: Substantial/maximal assistance  Putting on/taking off footwear: 02:  Substantial/maximal assistance  Transfers:  Roll left and right: 03: Partial/moderate assistance  Sit to lyin: Partial/moderate assistance  Lying to sitting on side of bed: 03: Partial/moderate assistance  Sit to stand: 03: Partial/moderate assistance  Chair/bed to chair transfer: 03: Partial/moderate assistance  Toilet transfer: 03: Partial/moderate assistance  Mobility:  Walk 10 ft: 03: Partial/moderate assistance  Walk 50 ft with two turns: 09: Not applicable  Walk [address]: 09: Not applicable    CURRENT GAP IN FUNCTION  Prior to Admission: Patient was I with mobility with RW, ADL’s, required A with IADL’s.        Expected functional outcomes: It is expected that with skilled acute rehabilitation services the patient will progress to Supervision for self care and Supervision for mobility     Estimated length of stay: 10 to 14 days    Anticipated Post-Discharge Disposition/Treatment  Disposition: Return to previous home/apartment. Outpatient Services: Physical Therapy (PT), Occupational Therapy (OT) and Speech Therapy    BARRIERS TO DISCHARGE  Weakness, Pain, Balance Difficulty, Fatigue and Resource Availability    INTERVENTIONS FOR DISCHARGE  Adaptive equipment, Patient/Family/Caregiver Education, [employer], Support Group, Arrange DME needs, Medication Changes Per [facility] MD recommendations, Therapy exercises, Center of balance support  and Energy conservation education     REQUIRED THERAPY:  Patient will require PT, OT and ST 60 minutes each per day, five days per week to achieve rehab goals. REQUIRED FUNCTIONAL AND MEDICAL MANAGEMENT FOR INPATIENT REHABILITATION:  Skin:  There are no pressure sores currently, hematoma to L hip, scattered bruising, Pain Management: Overall pain is moderately controlled    RECOMMENDED LEVEL OF CARE: Pt is a 70year old female w PMH HTN, hx of CVA on Eliquis, CHF, GERD, SHAR, gout, bipolar depression, RA, presented as a trauma transfer from [facility] after mechanical fall down her front porch stairs. Found to have left leg/left hip hematoma with evidence of active extravasation on CT imaging.  Patient is poor historian and not able to give much detail of the event but apparently this morning had gotten up thinking she was opening the door to the bathroom when she opened her front door and fell down the front porch stairs, hit and landed on her left hip/left side with immediate pain to the area, she does not think she hit her head, denies loss of consciousness, does take Eliquis for history of recent stroke in February 2023.  Patient apparently had hypotension with systolics in the 43Z in triage at [facility] but has since normalized.  Also of note patient has history of difficulty with ambulation and multiple prior falls with multiple prior/healing injuries seen on imaging. Binder present over hips for tamponade of hematoma, continue to monitor left lower extremity exam specifically thigh compartment but now been discontinued. Hgb 6.8 on 7/2, 1 u pRBC ordered for transfusion. Hgb 7/3 was 9.5 post transfusion. Patient also with chronic dysphagia. Speech consult placed- dysphagia 1 diet. Repeat VBS later this week. Patient had a fall morning of 7/4. CTA of left hip negative for any new findings/worsening hematoma. Hgb the morning of 7/5  7.4 (was 9.5 and 9.0 yesterday 7/4). PTA patient was indpenedent with ADLs and functional mobility with a walker and is now debilitated to Fei levels for FM and  Mod A levels for ADLs. PT,ST and OT have been consulted and are recommending post-acute rehab services. Patient's case has been reviewed with [facility] medical director, patient meets medical criteria for acute rehab and has demonstrated the ability to tolerate three or more hours of therapy per day. Patient is medically stable and ready for discharge to the [facility]. Pt would benefit from [facility] admission to have close medical management while participating in 3 hours of therapy per day that will include physical,speech and occupational therapy. Physical and occupational therapists will address functional mobility deficits and assist patient in improving their strength, endurance, ROM, and self care. ST will address chronic dysphagia and will advance diet as able.  Pt will have 24/7 nursing care to monitor routine vitals, skin integrity, and overall condition. The rehab nursing staff will follow therapy recommendations to have 24 hour follow through during non-therapy hours. PM&R to maximize function and provide medical oversight. The MD will monitor patient co-morbidities while on the unit as well as order any additional tests, labs, and consults needed. The [facility] specialized interdisciplinary team will meet weekly to discuss patient overall medical status and rehab goals in preparation for D/C home. Inpatient acute rehab is recommended for patient to maximize overall strength, endurance, self care, and mobility for a safe and timely transition back home.

## 2023-07-06 NOTE — PROGRESS NOTES
[facility] [facility]  Progress Note  Name: Nnamdi You  MRN: [id number]  [facility] Date of Admission: 6/30/2023   Date of Service: 7/6/2023 I Hospital Day: 5    Assessment/Plan   * Leg hematoma, left, initial encounter  Assessment & Plan  - home Eliquis held on admission but did not attempt reversal due to recent stroke. Eliquis resumed on 7/3.   - Hgb with slow down-trend from 9 to mid 7s on 7/4 when repeat CT RLE with contrast was obtained and showed decreased size of hematoma with no active exravasation.   - Hgb stable at 8.5. No signs of ongoing bleeding clinically. - Hgb 6.8 on 7/2, 1 u pRBC ordered for transfusion  - F/u with trauma in 2 weeks and continue Eliquis on discharge.      Stroke [facility])  Assessment & Plan  - R MCA stroke s/p mechanical thrombectomy in February 2023 with residual L sided weakness, dysphasia and dysphagia  - Eliquis resumed 7/3   - continue to follow up with neurology as an outpatient  - continue PT/OT and speech therapy outpatient    Dysphagia  Assessment & Plan  - Chronic dysphagia  - speech consult placed- dysphagia 1 diet with thin liquids  - patient is tolerating current diet    Hypercalcemia  Assessment & Plan  - Continue home cinacalcet   - outpatient f/u with PCP    Bipolar depression ([address])  Assessment & Plan  - Continue home wellbutrin, cymbalta     CHF (congestive heart failure) ([address])  Assessment & Plan  Wt Readings from Last 3 Encounters:   06/30/23 55.3 kg (122 lb)   06/06/23 47.9 kg (105 lb 9.6 oz)   05/01/23 56.8 kg (125 lb 3.2 oz)     - ECHO 2/2023 with EF 30%, Grade II diastolic dysfunction, moderate aortic regurgitation  - continue home metoprolol and aldactone  - no evidence of acute exacerbation at this time  - f/u with heart failure team as an outpatient as previously scheduled             Bowel Regimen: senna  VTE Prophylaxis:Sequential compression device (Venodyne)  and Reason for no pharmacologic prophylaxis Apixiban     Disposition: continue med-surg status, ARC tomorrow    Subjective   Chief Complaint: "I'm doing well"    Subjective: Patient is feeling well. She has no new complaints. She was SOB this morning but that was transient and she is feeling well now. Motivated to go to rehab. Objective   Vitals:   Temp:  [97.6 °F (36.4 °C)-98.3 °F (36.8 °C)] 97.6 °F (36.4 °C)  HR:  [] 113  Resp:  [18-19] 18  BP: (110-130)/(71-84) 130/80    I/O       07/04 0701  07/05 0700 07/05 0701  07/06 0700 07/06 0701 07/07 0700    P. O. 240  118    I.V. (mL/kg) 1800 (32.5)      Total Intake(mL/kg) 2040 (36.9)  118 (2.1)    Urine (mL/kg/hr)  175 (0.1)     Total Output  175     Net +2040 -175 +118           Unmeasured Urine Occurrence 4 x 2 x 1 x    Unmeasured Stool Occurrence 1 x             Physical Exam:   GENERAL APPEARANCE: NAD  NEURO: GCS 15,+ LUE weakness, chronic and stable  HEENT: NCAT  CV: RRR, no MGR  LUNGS: CTA bilaterally  GI: soft,non-tender,non-distended  : voiding  MSK: + L thigh hematoma soft and stable; NVI distally in LLE  SKIN: pink, warm, dry    Invasive Devices     Peripheral Intravenous Line  Duration           Long-Dwell Peripheral IV (Midline) 16/43/14 Right Basilic 3 days                      Lab Results:   Results: I have personally reviewed all pertinent laboratory/tests results, BMP/CMP: No results found for: "SODIUM", "K", "CL", "CO2", "ANIONGAP", "BUN", "CREATININE", "GLUCOSE", "CALCIUM", "AST", "ALT", "ALKPHOS", "PROT", "BILITOT", "EGFR" and CBC:   Lab Results   Component Value Date    WBC 5.31 07/06/2023    HGB 8.5 (L) 07/06/2023    HCT 26.4 (L) 07/06/2023    MCV 94 07/06/2023     07/06/2023    RBC 2.80 (L) 07/06/2023    MCH 30.4 07/06/2023    MCHC 32.2 07/06/2023    RDW 16.9 (H) 07/06/2023    MPV 10.1 07/06/2023    NRBC 0 07/06/2023     Imaging: I have personally reviewed pertinent reports.      Other Studies: no new

## 2023-07-07 ENCOUNTER — HOSPITAL ENCOUNTER (INPATIENT)
Facility: HOSPITAL | Age: 68
LOS: 22 days | Discharge: HOME HEALTH | DRG: 949 | End: 2023-07-29
Attending: PHYSICAL MEDICINE & REHABILITATION | Admitting: PHYSICAL MEDICINE & REHABILITATION
Payer: MEDICARE

## 2023-07-07 VITALS
DIASTOLIC BLOOD PRESSURE: 69 MMHG | RESPIRATION RATE: 18 BRPM | HEIGHT: 63 IN | OXYGEN SATURATION: 99 % | WEIGHT: 122 LBS | TEMPERATURE: 97.6 F | SYSTOLIC BLOOD PRESSURE: 116 MMHG | BODY MASS INDEX: 21.62 KG/M2 | HEART RATE: 84 BPM

## 2023-07-07 DIAGNOSIS — R13.12 OROPHARYNGEAL DYSPHAGIA: ICD-10-CM

## 2023-07-07 DIAGNOSIS — Z87.81 HX OF COMPRESSION FRACTURE OF SPINE: ICD-10-CM

## 2023-07-07 DIAGNOSIS — D64.9 ANEMIA, UNSPECIFIED TYPE: ICD-10-CM

## 2023-07-07 DIAGNOSIS — S22.000A THORACIC COMPRESSION FRACTURE (HCC): ICD-10-CM

## 2023-07-07 DIAGNOSIS — M80.00XD AGE-RELATED OSTEOPOROSIS WITH CURRENT PATHOLOGICAL FRACTURE WITH ROUTINE HEALING, SUBSEQUENT ENCOUNTER: ICD-10-CM

## 2023-07-07 DIAGNOSIS — I50.9 CHRONIC CONGESTIVE HEART FAILURE, UNSPECIFIED HEART FAILURE TYPE (HCC): ICD-10-CM

## 2023-07-07 DIAGNOSIS — R13.19 ESOPHAGEAL DYSPHAGIA: ICD-10-CM

## 2023-07-07 DIAGNOSIS — N39.0 RECURRENT UTI: ICD-10-CM

## 2023-07-07 DIAGNOSIS — N39.0 URINARY TRACT INFECTION WITHOUT HEMATURIA, SITE UNSPECIFIED: ICD-10-CM

## 2023-07-07 DIAGNOSIS — E83.52 HYPERCALCEMIA: ICD-10-CM

## 2023-07-07 DIAGNOSIS — G89.4 CHRONIC PAIN SYNDROME: ICD-10-CM

## 2023-07-07 DIAGNOSIS — I63.9 ACUTE CVA (CEREBROVASCULAR ACCIDENT) (HCC): ICD-10-CM

## 2023-07-07 DIAGNOSIS — M06.09 RHEUMATOID ARTHRITIS OF MULTIPLE SITES WITH NEGATIVE RHEUMATOID FACTOR (HCC): ICD-10-CM

## 2023-07-07 DIAGNOSIS — G47.00 INSOMNIA, UNSPECIFIED TYPE: ICD-10-CM

## 2023-07-07 DIAGNOSIS — K59.03 DRUG-INDUCED CONSTIPATION: ICD-10-CM

## 2023-07-07 DIAGNOSIS — F31.9 BIPOLAR DEPRESSION (HCC): ICD-10-CM

## 2023-07-07 DIAGNOSIS — E83.42 HYPOMAGNESEMIA: ICD-10-CM

## 2023-07-07 DIAGNOSIS — M25.511 ACUTE PAIN OF RIGHT SHOULDER: ICD-10-CM

## 2023-07-07 DIAGNOSIS — S80.12XA LEG HEMATOMA, LEFT, INITIAL ENCOUNTER: Primary | ICD-10-CM

## 2023-07-07 PROBLEM — B37.0 THRUSH: Status: ACTIVE | Noted: 2023-07-07

## 2023-07-07 PROBLEM — F51.4 NIGHT TERRORS: Status: ACTIVE | Noted: 2023-07-07

## 2023-07-07 PROCEDURE — NC001 PR NO CHARGE

## 2023-07-07 PROCEDURE — 99222 1ST HOSP IP/OBS MODERATE 55: CPT | Performed by: INTERNAL MEDICINE

## 2023-07-07 PROCEDURE — 99238 HOSP IP/OBS DSCHRG MGMT 30/<: CPT | Performed by: STUDENT IN AN ORGANIZED HEALTH CARE EDUCATION/TRAINING PROGRAM

## 2023-07-07 PROCEDURE — 99223 1ST HOSP IP/OBS HIGH 75: CPT | Performed by: PHYSICAL MEDICINE & REHABILITATION

## 2023-07-07 RX ORDER — BUPROPION HYDROCHLORIDE 100 MG/1
100 TABLET ORAL 2 TIMES DAILY
Status: DISCONTINUED | OUTPATIENT
Start: 2023-07-07 | End: 2023-07-29 | Stop reason: HOSPADM

## 2023-07-07 RX ORDER — SENNOSIDES 8.6 MG
8.6 TABLET ORAL
Refills: 0 | Status: ON HOLD
Start: 2023-07-07

## 2023-07-07 RX ORDER — ACETAMINOPHEN 325 MG/1
650 TABLET ORAL EVERY 6 HOURS SCHEDULED
Status: DISCONTINUED | OUTPATIENT
Start: 2023-07-07 | End: 2023-07-29 | Stop reason: HOSPADM

## 2023-07-07 RX ORDER — OXYCODONE HYDROCHLORIDE 5 MG/1
TABLET ORAL
Qty: 20 TABLET | Refills: 0 | Status: ON HOLD | OUTPATIENT
Start: 2023-07-07

## 2023-07-07 RX ORDER — GABAPENTIN 400 MG/1
400 CAPSULE ORAL 2 TIMES DAILY
Status: DISCONTINUED | OUTPATIENT
Start: 2023-07-07 | End: 2023-07-21

## 2023-07-07 RX ORDER — ALBUTEROL SULFATE 90 UG/1
2 AEROSOL, METERED RESPIRATORY (INHALATION) EVERY 6 HOURS PRN
Status: DISCONTINUED | OUTPATIENT
Start: 2023-07-07 | End: 2023-07-29 | Stop reason: HOSPADM

## 2023-07-07 RX ORDER — LANOLIN ALCOHOL/MO/W.PET/CERES
3 CREAM (GRAM) TOPICAL
Status: DISCONTINUED | OUTPATIENT
Start: 2023-07-07 | End: 2023-07-29 | Stop reason: HOSPADM

## 2023-07-07 RX ORDER — SENNOSIDES 8.6 MG
1 TABLET ORAL
Status: DISCONTINUED | OUTPATIENT
Start: 2023-07-07 | End: 2023-07-07

## 2023-07-07 RX ORDER — PRAZOSIN HYDROCHLORIDE 1 MG/1
1 CAPSULE ORAL
Status: DISCONTINUED | OUTPATIENT
Start: 2023-07-07 | End: 2023-07-08

## 2023-07-07 RX ORDER — SPIRONOLACTONE 25 MG/1
25 TABLET ORAL DAILY
Status: DISCONTINUED | OUTPATIENT
Start: 2023-07-08 | End: 2023-07-15

## 2023-07-07 RX ORDER — DULOXETIN HYDROCHLORIDE 60 MG/1
60 CAPSULE, DELAYED RELEASE ORAL 2 TIMES DAILY
Status: DISCONTINUED | OUTPATIENT
Start: 2023-07-07 | End: 2023-07-29 | Stop reason: HOSPADM

## 2023-07-07 RX ORDER — LANOLIN ALCOHOL/MO/W.PET/CERES
400 CREAM (GRAM) TOPICAL 2 TIMES DAILY
Status: DISCONTINUED | OUTPATIENT
Start: 2023-07-07 | End: 2023-07-29 | Stop reason: HOSPADM

## 2023-07-07 RX ORDER — METOPROLOL SUCCINATE 25 MG/1
25 TABLET, EXTENDED RELEASE ORAL 2 TIMES DAILY
Status: DISCONTINUED | OUTPATIENT
Start: 2023-07-07 | End: 2023-07-08

## 2023-07-07 RX ORDER — CINACALCET 30 MG/1
30 TABLET, FILM COATED ORAL
Status: DISCONTINUED | OUTPATIENT
Start: 2023-07-08 | End: 2023-07-29 | Stop reason: HOSPADM

## 2023-07-07 RX ORDER — ONDANSETRON 4 MG/1
4 TABLET, ORALLY DISINTEGRATING ORAL EVERY 6 HOURS PRN
Status: DISCONTINUED | OUTPATIENT
Start: 2023-07-07 | End: 2023-07-29 | Stop reason: HOSPADM

## 2023-07-07 RX ORDER — OXYCODONE HYDROCHLORIDE 5 MG/1
5 TABLET ORAL EVERY 4 HOURS PRN
Status: DISCONTINUED | OUTPATIENT
Start: 2023-07-07 | End: 2023-07-14

## 2023-07-07 RX ORDER — PANTOPRAZOLE SODIUM 40 MG/1
40 TABLET, DELAYED RELEASE ORAL
Status: DISCONTINUED | OUTPATIENT
Start: 2023-07-08 | End: 2023-07-29 | Stop reason: HOSPADM

## 2023-07-07 RX ORDER — ACETAMINOPHEN 325 MG/1
650 TABLET ORAL EVERY 6 HOURS SCHEDULED
Refills: 0 | Status: ON HOLD
Start: 2023-07-07

## 2023-07-07 RX ORDER — HYDROXYCHLOROQUINE SULFATE 200 MG/1
200 TABLET, FILM COATED ORAL 2 TIMES DAILY WITH MEALS
Status: DISCONTINUED | OUTPATIENT
Start: 2023-07-07 | End: 2023-07-29 | Stop reason: HOSPADM

## 2023-07-07 RX ADMIN — ACETAMINOPHEN 650 MG: 325 TABLET, FILM COATED ORAL at 08:12

## 2023-07-07 RX ADMIN — BUPROPION HYDROCHLORIDE 100 MG: 100 TABLET, FILM COATED ORAL at 08:12

## 2023-07-07 RX ADMIN — SPIRONOLACTONE 25 MG: 25 TABLET ORAL at 08:12

## 2023-07-07 RX ADMIN — DULOXETINE HYDROCHLORIDE 60 MG: 60 CAPSULE, DELAYED RELEASE ORAL at 17:43

## 2023-07-07 RX ADMIN — ACETAMINOPHEN 650 MG: 325 TABLET, FILM COATED ORAL at 20:58

## 2023-07-07 RX ADMIN — MAGNESIUM OXIDE TAB 400 MG (241.3 MG ELEMENTAL MG) 400 MG: 400 (241.3 MG) TAB at 17:43

## 2023-07-07 RX ADMIN — PANTOPRAZOLE SODIUM 40 MG: 40 TABLET, DELAYED RELEASE ORAL at 05:43

## 2023-07-07 RX ADMIN — GABAPENTIN 400 MG: 400 CAPSULE ORAL at 08:12

## 2023-07-07 RX ADMIN — NYSTATIN 500000 UNITS: 100000 SUSPENSION ORAL at 17:43

## 2023-07-07 RX ADMIN — GABAPENTIN 400 MG: 400 CAPSULE ORAL at 17:44

## 2023-07-07 RX ADMIN — MELATONIN TAB 3 MG 3 MG: 3 TAB at 20:58

## 2023-07-07 RX ADMIN — NYSTATIN 500000 UNITS: 100000 SUSPENSION ORAL at 22:38

## 2023-07-07 RX ADMIN — HYDROXYCHLOROQUINE SULFATE 200 MG: 200 TABLET ORAL at 17:46

## 2023-07-07 RX ADMIN — BUPROPION HYDROCHLORIDE 100 MG: 100 TABLET, FILM COATED ORAL at 17:47

## 2023-07-07 RX ADMIN — HYDROXYCHLOROQUINE SULFATE 200 MG: 200 TABLET ORAL at 08:12

## 2023-07-07 RX ADMIN — APIXABAN 5 MG: 5 TABLET, FILM COATED ORAL at 18:15

## 2023-07-07 RX ADMIN — APIXABAN 5 MG: 5 TABLET, FILM COATED ORAL at 08:12

## 2023-07-07 RX ADMIN — DULOXETINE HYDROCHLORIDE 60 MG: 60 CAPSULE, DELAYED RELEASE ORAL at 08:12

## 2023-07-07 RX ADMIN — LAMOTRIGINE 150 MG: 100 TABLET ORAL at 21:08

## 2023-07-07 RX ADMIN — MAGNESIUM OXIDE TAB 400 MG (241.3 MG ELEMENTAL MG) 400 MG: 400 (241.3 MG) TAB at 08:12

## 2023-07-07 RX ADMIN — METOPROLOL SUCCINATE 25 MG: 25 TABLET, FILM COATED, EXTENDED RELEASE ORAL at 08:12

## 2023-07-07 RX ADMIN — CINACALCET 30 MG: 30 TABLET, FILM COATED ORAL at 08:12

## 2023-07-07 NOTE — ASSESSMENT & PLAN NOTE
Home: Toprol XL 25mg BID  Here: Toprol XL 25mg daily  On 7/6 with HR in 115-120. EKG showed sinus tach. Trops trended and negative. - Associated with transient/mild SOB   - She was only being given Toprol daily in the hospital   - Increased to BID with improvement. Had to be decreased eventually as was not meeting parameters.   Lytes with K > 4, Mag > 2  Monitor and adjust regimen as appropriate  IM consulted to assist with management  Outpatient f/u with Dr. Jorge A Whalen

## 2023-07-07 NOTE — ASSESSMENT & PLAN NOTE
2/2 L leg hematoma (most recent imaging negative for extravasation)  At home on eliquis  No reversal inpatient due to recent CVA  Prior to admission Hgb 10-12. Ravi 7/2 6.8 - given 1 unit pRBC  7/27 Hgb 8.5 > 9.2 > 9.3 > 10.1 > 9.9 > 10.9 > 9.6 > 9.5 > 9.2  IM consulted to assist with management  Recommend outpatient f/u with PCP to monitor Hgb in 1 week.

## 2023-07-07 NOTE — ASSESSMENT & PLAN NOTE
2/2 fall    6/30 CT CAP:  Large hematoma in the lateral left thigh measuring up to 9.6 cm with focus of active contrast extravasation. No acute intrathoracic or intra-abdominal injury. 7/4 CTA Pelvis:  1. Decreasing size of left lateral thigh hematoma without evidence for active extravasation. 2.  No CT findings in the visualized pelvis and thighs to explain falling hematocrit. 3.  Pancolitis with increasing inflammatory free fluid in the pelvis. No free air, pneumatosis, or encapsulated collections demonstrated. Non-operatively managed. Did require transfusion x1  Pain management as below  Monitor Hgb.   PT/OT 3-5 hours/day, 5-7 days/week  Outpatient f/u with General Surgery

## 2023-07-07 NOTE — CASE MANAGEMENT
Case Management Discharge Planning Note    Patient name Uriel Hooper  Location [address] [place] [address] [address] 214-35 MRN [id number]  : 1955 Date 2023       Current Admission Date: 2023  Current Admission Diagnosis:Leg hematoma, left, initial encounter   Patient Active Problem List    Diagnosis Date Noted   • Leg hematoma, left, initial encounter 2023   • Cerebral aneurysm, nonruptured 05/15/2023   • Nocturnal hypoxia 2023   • Stroke-like symptoms 2023   • Insomnia 2023   • Hypomagnesemia 2023   • Sinus tachycardia 2023   • Hypokalemia 03/10/2023   • AMS (altered mental status) 2023   • Gout of foot 2023   • Dysphagia 2023   • Wound of left ankle 2023   • Obstructive sleep apnea 2023   • History of stroke 2023   • History of bacteremia 2023   • Acute CVA (cerebrovascular accident) ([address]) 2023   • Hypercalcemia 2023   • Discoloration of skin of foot 2023   • Abdominal pain 2023   • HFrEF (heart failure with reduced ejection fraction) ([address]) 2023   • Stroke ([address]) 2023   • HTN (hypertension) 2023   • Cardiomyopathy ([address]) 2023   • Vitamin D insufficiency 2023   • LAILA (acute kidney injury) ([address]) 2022   • Polypharmacy 2022   • 'light-for-dates' infant with signs of fetal malnutrition 2022   • Bipolar depression ([address]) 2022   • Rheumatoid arthritis of multiple sites with negative rheumatoid factor ([address]) 2022   • Closed fracture of multiple ribs of right side 2022   • Traumatic pneumothorax 2022   • Closed fracture of transverse process of lumbar vertebra ([address]) 2022   • Liver contusion 2022   • Fall 2022   • Acute pain due to trauma 2022   • Right shoulder pain 2022   • Severe protein-calorie malnutrition ([address]) 2022   • Undifferentiated connective tissue disease ([address]) 2021   • Primary generalized (osteo)arthritis 12/29/2021   • Peripheral neuropathy 12/29/2021   • Osteoporosis 12/29/2021   • Hx of compression fracture of spine 12/29/2021   • Lumbar spondylosis 12/29/2021   • Cervical spondylosis 12/29/2021   • Anemia 06/26/2021   • Chronic pain syndrome    • Mild intermittent asthma without complication    • Ambulatory dysfunction    • Seronegative arthropathy of multiple sites (HCC)    • Gastroesophageal reflux disease without esophagitis    • Mixed stress and urge urinary incontinence    • CHF (congestive heart failure) ([address] [facility])       LOS (days): 6  Geometric Mean LOS (GMLOS) (days): 3.80  Days to GMLOS:-2.1     OBJECTIVE:  Risk of Unplanned Readmission Score: 45.75         Current admission status: Inpatient   Preferred Pharmacy:   [address] [place] [address]  Phone: [phone] Fax: [phone] [facility], [address] [place] [address]  [address] [place] [address] [address] [address]  Phone: [phone] Fax: [phone]    Primary Care Provider: Sade Beltran DO    Primary Insurance: MEDICARE  Secondary Insurance: AARP    DISCHARGE DETAILS:    Pt will d/c to B ARC @ 1000.    Pt will d/c into room 970  Report can be called to

## 2023-07-07 NOTE — ASSESSMENT & PLAN NOTE
Wt Readings from Last 3 Encounters:   06/30/23 55.3 kg (122 lb)   06/06/23 47.9 kg (105 lb 9.6 oz)   05/01/23 56.8 kg (125 lb 3.2 oz)     - ECHO 2/2023 with EF 30%, Grade II diastolic dysfunction, moderate aortic regurgitation  - continue home metoprolol and aldactone. Continue metoprolol succinate at home dose 25 mg BID.    - no evidence of acute exacerbation at this time  - f/u with heart failure team as an outpatient as previously scheduled

## 2023-07-07 NOTE — ASSESSMENT & PLAN NOTE
Hx L1 compression fx s/p kyphoplasty  Has multiple compression fractures noted on Thoracic spine imaging   - And possibly new anterior wedging of T12 on most recent imaging.   Was previously on Prolia - last given in 2020   - Held due to dental issues  Recommend outpatient f/u with Endocrinology  2/23 Vitamin D level is 41.7

## 2023-07-07 NOTE — DISCHARGE INSTR - AVS FIRST PAGE
Seek medical attn if you develop increased pain or swelling to the left leg hematoma or if you develop edema of the leg, chest pain, shortness of breath, dizziness/lightheadedness, significant fatigue, fevers/chills. Continue your Eliqius. Avoid strenuous physical activity, bending/twisting/crawling, heavy lifting/pushing/pulling > 5 pounds.

## 2023-07-07 NOTE — PLAN OF CARE
Problem: PAIN - ADULT  Goal: Verbalizes/displays adequate comfort level or baseline comfort level  Description: Interventions:  - Encourage patient to monitor pain and request assistance  - Assess pain using appropriate pain scale  - Administer analgesics based on type and severity of pain and evaluate response  - Implement non-pharmacological measures as appropriate and evaluate response  - Consider cultural and social influences on pain and pain management  - Notify physician/advanced practitioner if interventions unsuccessful or patient reports new pain  Outcome: Progressing     Problem: INFECTION - ADULT  Goal: Absence or prevention of progression during hospitalization  Description: INTERVENTIONS:  - Assess and monitor for signs and symptoms of infection  - Monitor lab/diagnostic results  - Monitor all insertion sites, i.e. indwelling lines, tubes, and drains  - Monitor endotracheal if appropriate and nasal secretions for changes in amount and color  - [place] appropriate cooling/warming therapies per order  - Administer medications as ordered  - Instruct and encourage patient and family to use good hand hygiene technique  - Identify and instruct in appropriate isolation precautions for identified infection/condition  Outcome: Progressing  Goal: Absence of fever/infection during neutropenic period  Description: INTERVENTIONS:  - Monitor WBC    Outcome: Progressing     Problem: SAFETY ADULT  Goal: Patient will remain free of falls  Description: INTERVENTIONS:  - Educate patient/family on patient safety including physical limitations  - Instruct patient to call for assistance with activity   - Consult OT/PT to assist with strengthening/mobility   - Keep Call bell within reach  - Keep bed low and locked with side rails adjusted as appropriate  - Keep care items and personal belongings within reach  - Initiate and maintain comfort rounds  - Make Fall Risk Sign visible to staff  - Offer Toileting every 2 Hours, in advance of need  - Initiate/Maintain bed/chair alarm  - Obtain necessary fall risk management equipment: non skid footwear  - Apply yellow socks and bracelet for high fall risk patients  - Consider moving patient to room near nurses station  Outcome: Progressing  Goal: Maintain or return to baseline ADL function  Description: INTERVENTIONS:  -  Assess patient's ability to carry out ADLs; assess patient's baseline for ADL function and identify physical deficits which impact ability to perform ADLs (bathing, care of mouth/teeth, toileting, grooming, dressing, etc.)  - Assess/evaluate cause of self-care deficits   - Assess range of motion  - Assess patient's mobility; develop plan if impaired  - Assess patient's need for assistive devices and provide as appropriate  - Encourage maximum independence but intervene and supervise when necessary  - Involve family in performance of ADLs  - Assess for home care needs following discharge   - Consider OT consult to assist with ADL evaluation and planning for discharge  - Provide patient education as appropriate  Outcome: Progressing  Goal: Maintains/Returns to pre admission functional level  Description: INTERVENTIONS:  - Perform BMAT or MOVE assessment daily.   - Set and communicate daily mobility goal to care team and patient/family/caregiver. - Collaborate with rehabilitation services on mobility goals if consulted  - Perform Range of Motion 3 times a day. - Reposition patient every 2 hours.   - Dangle patient 3 times a day  - Stand patient 3 times a day  - Ambulate patient 3 times a day  - Out of bed to chair 3 times a day   - Out of bed for meals 3 times a day  - Out of bed for toileting  - Record patient progress and toleration of activity level   Outcome: Progressing     Problem: DISCHARGE PLANNING  Goal: Discharge to home or other facility with appropriate resources  Description: INTERVENTIONS:  - Identify barriers to discharge w/patient and caregiver  - Arrange for needed discharge resources and transportation as appropriate  - Identify discharge learning needs (meds, wound care, etc.)  - Arrange for interpretive services to assist at discharge as needed  - Refer to Case Management Department for coordinating discharge planning if the patient needs post-hospital services based on physician/advanced practitioner order or complex needs related to functional status, cognitive ability, or social support system  Outcome: Progressing

## 2023-07-07 NOTE — ASSESSMENT & PLAN NOTE
Follows with Dr. Dionne Mann with Rheum and is on Hydroxychloroquine 200mg BID.   Continue home regimen and follow-up with rheumatology on discharge

## 2023-07-07 NOTE — ASSESSMENT & PLAN NOTE
Home: Prazosin  Here: none - held due to BP  Continue melatonin - so far tolerating.   Outpatient f/u with PCP

## 2023-07-07 NOTE — ASSESSMENT & PLAN NOTE
2/2 previous CVA earlier this year. Noted to have impaired oropharyngeal swallow  Was on free water protocol at home - held initially due to thrush - which has been treated and is resolved. On Level 1/NTL  - Inpatient SLP recommended VBS  - Consulting SLP here - they did not recommend VBS at this time.   - Aspiration precautions  - Strict oral hygiene prior to trials  - esophagogram scheduled for 7/19, unable to complete d/t Aspiration risk

## 2023-07-07 NOTE — ASSESSMENT & PLAN NOTE
Frankly incontinent in the hospital  History of pubovaginal sling with LVPG  Initiate timed voids and scans   - PVRs have all been low   - Timed voids help significantly with continence. Has history of recurrent UTIs - see that entry for details  Close continence care  Recommend outpatient f/u with Urogynecology.  Referral provided at discharge

## 2023-07-07 NOTE — INCIDENTAL FINDINGS
The following findings require follow up:  Radiographic finding   Finding: Stable 1 cm calcified granuloma in the right lower lobe of the lung    Pancolitis with increasing inflammatory free fluid in the pelvis   Follow up required: family doctor   Follow up should be done within 2 week(s)    Patient informed of incidental findings and recommended follow up.

## 2023-07-07 NOTE — ASSESSMENT & PLAN NOTE
Wt Readings from Last 3 Encounters:   07/28/23 49 kg (108 lb 0.4 oz)   06/30/23 55.3 kg (122 lb)   06/06/23 47.9 kg (105 lb 9.6 oz)     2/2023 Echo 30% with G2DD, moderate AI  Home: Toprol XL 25mg daily, Aldactone 25mg daily   - Limited by BP - no ACE/ARB   - no SGLT2i due to recurrent UTI  Here: Toprol XL 25mg daily at bedtime. Unclear etiology of cardiomyopathy  Possible consideration for ischemic work-up outpatient, poor candidate for aggressive procedures   Monitor I/O/daily weights, volume status, lytes. IM consulted to assist with management  Outpatient f/u with Dr. Anna Wood   - At her last visit, they wanted repeat echo in 2 months (due June 2023). Ordered prior to discharge to be performed while here since transportation/mobility is difficult.

## 2023-07-07 NOTE — ASSESSMENT & PLAN NOTE
Malnutrition Findings:                                 BMI Findings: Body mass index is 20.89 kg/m².      Consulted nutrition

## 2023-07-07 NOTE — CONSULTS
Internal Medicine Consultation Note    Patient: Michelle Esquivel  Age/sex: 70 y.o. female  Medical Record #: [id number]      Assessment/Plan:    Left lateral thigh hematoma   · Stable  · Will watch hemoglobin    ABLA  · S/p 1 unit PRBCs 7/2/23    Thrush  · Nystatin    Chronic systolic heart failure   · Originally diagnosed 2004 when LVEF then was 10-15% and felt to be NICM  · Her LVEF did recover into 50's then fell when she had her CVA in 2/2023 to 30%  · Also has moderate AI/gr 2 DD  · Had been seeing Cardiology at [facility] but now sees Dr Virginie Freitas  · She declined a LifeVest in 2/2023 when LVEF was found to be 30%  · Had been on Coreg in past but HF team switched her to Toprol when she had her stroke 2/2023  · Her soft BPs have limited starting her on ACEI/ARB as OP  · Home:  Toprol XL 25mg BID/Aldactone 25mg qd  · Here:  Toprol XL 25 mg BID (inc from qd last evening)/Aldactone 25mg qd    Hypercalcemia  · Continue Cinacalcet here as was taking at home    HS hypoxia   · using 4L NC at sleep at home    Nightmares  · placed on Minipress when here with us after her CVA    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  · Has residual left sided weakness  · Had recrudescence of CVA 4/2023 with worsening left facial droop  · Continue Eliquis    Dysphagia  · Had admission for aspiration PNA at [facility] 5/2023 tx'd with Levaquin and Flagyl  · Continue pureed diet with NTL as at home  · ST to follow    Bipolar depression  · Home:  Wellbutrin 100mg BID/Cymbalta 60mg BID/Lamictal 150mg qhs  · Here:  Wellbutrin 100mg BID/Cymbalta 60mg BID/Lamictal 150mg qhs    Rheumatoid arthritis  · Continue Plaquenil 200mg BID as at home    Chronic pain/chronic opioid use  · 2/2 RA and lumbar spondylosis  · Pain management per PMR    Hx incidental right superior hypophyseal artery aneurysm  · Follows with NS Dr Renetta Olmedo    CRE urine 3/2023  · Maintain contact isolation    Dye allergy   · Was dye prepped on 7/4/23 with Benadryl 50mg IV x 1 w/o issues    Incidental findings  · Pancolitis with increasing inflammatory free fluid in pelvis  · Calcified granuloma RLL      Discharge date:  Team       Subjective/HPI:     Marilyn Mann is a 70year old patient with a history of hypercalcemia, HS hypoxia using 4L NC at sleep, nightmares (placed on Minipress), right MCA CVA/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, recrudescence of CVA 4/2023, dysphagia, bipolar depression, rheumatoid arthritis, chronic pain with chronic opioid use, CRE urine 5/6635, chronic systolic heart failure with LVEF 30%/moderate AI and dye allergy who was admitted with a fall. Patient fell down her porch steps and sustained a left lateral thigh hematoma on 6/30/23. Her hemoglobin fell to 6.8 on 7/2/23 and she received one unit PRBCs. She had an unwitnessed fall on 7/4 at her bedside but documented as no injury. On 7/4/23 her hemoglobin fell from 9 to 7.7.  she had a CTA of the pelvis which showed decreased size of hematoma and there was no active extravasation. On 7/6/23, she complained of MARTE when working with therapy and her HR was 130-140s with walking. EKG showed sinus rhythm without acute changes and troponins were negative. CXR showed "Patchy airspace opacity in the medial right lower lung zone, which may relate to atelectasis and/or pneumonia in the appropriate clinical setting". Sats remained stable on RA. Patient is now in [facility] for inpatient acute rehabilitation and we are asked to assist with medical management. Currently there are no complaints of CP, SOB, dizziness, N/V/D.     ROS:   A 10 point ROS was performed; negative except as noted above.      Social History:    Substance Use History:   Social History     Substance and Sexual Activity   Alcohol Use Not Currently    Comment: quit     Social History     Tobacco Use   Smoking Status Former   Smokeless Tobacco Never     Social History     Substance and Sexual Activity   Drug Use Not Currently   • Types: Marijuana Comment: has medical card       Family History:    Family History   Problem Relation Age of Onset   • No Known Problems Mother    • No Known Problems Father    • Arthritis Family    • Diabetes Family    • Stroke Family          Review of Scheduled Meds:  Current Facility-Administered Medications   Medication Dose Route Frequency Provider Last Rate   • acetaminophen  650 mg Oral Q6H Didier Evangelista MD     • albuterol  2 puff Inhalation Q6H PRN Sophie Kennedy MD     • apixaban  5 mg Oral BID Sophie Kennedy MD     • buPROPion  100 mg Oral BID Sophie Kennedy MD     • [START ON 7/8/2023] cinacalcet  30 mg Oral Daily With Breakfast Sophie Kennedy MD     • DULoxetine  60 mg Oral BID Sophie Kennedy MD     • gabapentin  400 mg Oral BID Sophie Kennedy MD     • hydroxychloroquine  200 mg Oral BID With Meals Sophie Kennedy MD     • lamoTRIgine  150 mg Oral HS Sophie Kennedy MD     • magnesium Oxide  400 mg Oral BID Sophie Kennedy MD     • melatonin  3 mg Oral HS Sophie Kennedy MD     • metoprolol succinate  25 mg Oral BID Sophie Kennedy MD     • nystatin  500,000 Units Swish & Spit 4x Daily Sophie Kennedy MD     • ondansetron  4 mg Oral Q6H PRN Sophie Kennedy MD     • oxyCODONE  5 mg Oral Q4H PRN Sophie Kennedy MD     • oxyCODONE  2.5 mg Oral Q4H PRN Sophie Kennedy MD     • [START ON 7/8/2023] pantoprazole  40 mg Oral Early Morning Sophie Kennedy MD     • prazosin  1 mg Oral HS Sophie Kennedy MD     • [START ON 7/8/2023] spironolactone  25 mg Oral Daily Sophie Kennedy MD         Labs:     Results from last 7 days   Lab Units 07/06/23  0534 07/05/23  0552   WBC Thousand/uL 5.31 4.34   HEMOGLOBIN g/dL 8.5* 7.4*   HEMATOCRIT % 26.4* 22.5*   PLATELETS Thousands/uL 229 174     Results from last 7 days   Lab Units 07/05/23  0552 07/04/23  0458   SODIUM mmol/L 144 144   POTASSIUM mmol/L 3.6 3.7   CHLORIDE mmol/L 114* 116*   CO2 mmol/L 25 24   BUN mg/dL 7 7   CREATININE mg/dL 0.72 0.71   CALCIUM mg/dL 7.5* 8.3 Results from last 7 days   Lab Units 23  0419   INR  1.33*        Results from last 7 days   Lab Units 23  0745 23  0311   POC GLUCOSE mg/dl 86 74       Lab Results   Component Value Date    BLOODCX No Growth After 5 Days. 2023    BLOODCX No Growth After 5 Days. 2023    BLOODCX Staphylococcus coagulase negative (A) 2023    URINECX <10,000 cfu/ml Actinomyces neuii (A) 2023    URINECX 50,000-59,000 cfu/ml Gardnerella vaginalis (A) 2023    URINECX >100,000 cfu/ml 2023    WOUNDCULT 4+ Growth of Staphylococcus aureus 10/04/2016       Input and Output Summary (last 24 hours): Intake/Output Summary (Last 24 hours) at 2023 1640  Last data filed at 2023 1349  Gross per 24 hour   Intake 240 ml   Output --   Net 240 ml       Imaging:     No orders to display       *Labs /Radiology studies reviewed  *Medications reviewed and reconciled as needed  *Please refer to order section for additional ordered labs studies  *Case discussed with primary attending during morning huddle case rounds    Vitals:   Temp (24hrs), Av.6 °F (36.4 °C), Min:96.5 °F (35.8 °C), Max:98.1 °F (36.7 °C)    Temp:  [96.5 °F (35.8 °C)-98.1 °F (36.7 °C)] 98.1 °F (36.7 °C)  HR:  [] 78  Resp:  [18-22] 18  BP: (110-123)/(66-81) 120/72  SpO2:  [94 %-99 %] 98 %  Body mass index is 21.79 kg/m². Physical Exam:   General Appearance: no distress, conversive  HEENT:  External ear normal.  Nose normal w/o drainage. Mucous membranes are moist. +thrush. Conjunctiva clear w/o icterus or redness. Neck:  Supple, normal ROM  Lungs: BBS without crackles/wheeze/rhonchi; respirations unlabored with normal inspiratory/expiratory effort. No retractions noted. On RA  CV: regular rate and rhythm; no rubs/murmurs/gallops, PMI normal   ABD: Abdomen is soft. Bowel sounds all quadrants. Nontender with no distention.     EXT: no edema  Skin: normal turgor, normal texture, no rashes  Psych: affect normal, mood normal  Neuro: AAO         Invasive Devices     Peripheral Intravenous Line  Duration           Long-Dwell Peripheral IV (Midline) 29/80/55 Right Basilic 4 days                 VTE Pharmacologic Prophylaxis: Eliquis  Code Status: Level 1 - Full Code  Current Length of Stay: 0 day(s)    Total floor / unit time spent today 1 hour with more than 50% spent counseling/coordinating care. Counseling includes discussion with patient re: progress  and discussion with patient of his/her current medical state/information. Coordination of patient's care was performed in conjunction with primary service. Time invested included review of patient's labs, vitals, and management of their comorbidities with continued monitoring. In addition, this patient was discussed with medical team including physician and advanced extenders. The care of the patient was extensively discussed and appropriate treatment plan was formulated unique for this patient by supervising physician unless stated otherwise in their attestation statement. ** Please Note: voice to text software may have been used in the creation of this document.  Audio transcription errors may occur**

## 2023-07-07 NOTE — H&P
PHYSICAL MEDICINE AND REHABILITATION H&P/ADMISSION NOTE  Yaz Herrera 70 y.o. female MRN: [id number]  Unit/Bed#: -01 Encounter: [id number]       Rehab Diagnosis: Impairment of mobility, safety and Activities of Daily Living (ADLs) due to Other Disabling Impairments:  13  Other Disabling Impairments    History of Present Illness:   Yaz Herrera is a 70 y.o. female with medical history of HTN, CVA in 2/2023 on eliquis (chronic dysphagia, L sided weakness, cognitive impairment), hypercalcemia,  CHF, GERD, SHAR, gout, bipolar disorder, RA on plaquenil, chronic opioid dependence on morphine for chronic back/joint pain, who presented to the [address] [place] [address] [address] on 6/30 after a fall, unable to get up and disoriented. She thinks she tripped and fell down her porch steps, landing on he L thigh. Found to have a L lateral thigh hematoma with evidence of extravasation on imaging. Binder placed over hips, and eliquis was held but not reversed given her recent stroke. Speech consulted and noted oropharyngeal swallow likely at baseline and recommended Level 1/NTL. Recommended for VBS. Course c/b ABLA on 7/2 Hgb dropped to 6.8 and she was given 1 unit pRBC. Geriatrics was consulted, noted at baseline she is oriented but forgetful, with worsening issues since her stroke. Mini-Cog was 9/15 in 2021. They recommended checking B12. They noted she has not been on lorazepam since January, and also recommended weaning down off gabapentin if possible. On 7/4 patient had an unwitnessed fall while trying to reach for her bag on the window sill. No head strike, but landed on her L hip. GCS 15, Neuro exam was baseline. CTA ordered to look for active bleeding, which showed decreased size of hematoma with no active extravasation. Hgb did drop after her transfusion, but seems to have stablized today at 8.5.  That being said, her HR was elevated today, and it appears a troponin has been ordered by the trauma team as well as a CXR. CXR showed hazy RLL opacity, likely 2/2 atelectasis, and trops trended down. Metoprolol was increased with improvement. She was admitted to the [facility] on 7/7. Subjective: Overall feeling well/much better today. Pain is controlled. No CP, SOB, palpitations. No cough. No fevers, chills, N/V. She has been incontinent during hospitalization, she feels this is 2/2 mobility issues. She reports she is typically more continent at home, and her son installed a bidet and will at times carry her to the bathroom when she is fatigued. She reports her tongue and mouth feel sore. Last BM in the past 24 hours. L leg pain controlled. She reports she takes ativan once at night to help her sleep, and prazosin for night terrors. She reports she takes the MSIR at home once daily generally. Daughter notes that she is normally not continent at home, wears diapers, sometimes is able to make it, but this had been worsening prior to admission. Review of Systems: A 10-point review of systems was performed. Negative except as listed above. Plan:     * Leg hematoma, left, initial encounter  Assessment & Plan  2/2 fall    6/30 CT CAP:  Large hematoma in the lateral left thigh measuring up to 9.6 cm with focus of active contrast extravasation. No acute intrathoracic or intra-abdominal injury. 7/4 CTA Pelvis:  1. Decreasing size of left lateral thigh hematoma without evidence for active extravasation. 2.  No CT findings in the visualized pelvis and thighs to explain falling hematocrit. 3.  Pancolitis with increasing inflammatory free fluid in the pelvis. No free air, pneumatosis, or encapsulated collections demonstrated. Non-operatively managed. Did require transfusion x1  Pain management as below  Monitor Hgb.   PT/OT 3-5 hours/day, 5-7 days/week  Outpatient f/u with General Surgery      Night terrors  Assessment & Plan  Home: Prazosin  Here: Prazosin  Continue melatonin  Outpatient f/u with PCP    Thrush  Assessment & Plan  Noted on admission  7/7 started on nystatin swish/spit. Hypomagnesemia  Assessment & Plan  Home: Mag oxide 800mg BID  Here: Mag oxide 400mg BID  Recheck level next labs  Goals > 2. Sinus tachycardia  Assessment & Plan  Home: Toprol XL 25mg BID  Here: Same  On 7/6 with HR in 115-120. EKG showed sinus tach. Trops trended and negative. - Associated with transient/mild SOB   - She was only being given Toprol daily in the hospital   - Increased to BID with improvement  Lytes with K > 4, Mag > 2  Monitor and adjust regimen as appropriate  IM consulted to assist with management  Outpatient f/u with Dr. Jose J Mendoza    Dysphagia  Assessment & Plan  2/2 previous CVA earlier this year. Noted to have impaired oropharyngeal swallow  Was on free water protocol at home - holding for now due to thrush  On Level 1/NTL  - Inpatient SLP recommended VBS  - Consulting SLP here  - Aspiration precautions    History of stroke  Assessment & Plan  MRI brain 2/22 - 1.  New small acute /more recent interval lacunar infarction in the right cerebral peduncle (series 4, image 14). 2.  Extensive multifocal large, previously present right middle cerebral artery infarction which is evolving with superimposed areas of evolving hemorrhage likely related to hemorrhagic transformation of subacute right MCA infarction.  No significant   mass effect. 3.  Mild, chronic microangiopathy. S/P thrombectomy 2/12 by Dr Mac Perez   Secondary stroke prophylaxis at this time with Eliquis 5 mg twice daily   PT, OT, SLP. Review modifiable risk factors and provide stroke education  Monitor for poststroke pain  Eliquis for secondary stroke prophylaxis    Outpatient f/u with Neurology    Obstructive sleep apnea  Assessment & Plan  Previously non-compliant with hospital or home CPAP  Outpatient f/u with PCP. Hypercalcemia  Assessment & Plan  Suspect 2/2 primary hyperparathyroidism  - Monitor calcium levels, monitor Phos.   Home: Sensipar 30mg daily  Here: Same  Check iCal and Phos with next labs. Recommend outpatient f/u with Endocrinology      HTN (hypertension)  Assessment & Plan  Tends to run on the low/normal side. Home: Toprol XL 25mg BID, Spironolactone 25mg daily  Here: Same  Of note, in her previous stay at the [facility], she was started on prazosin for nightmares. May be able to discontinue this to help with BP. Monitor and adjust as appropriate  IM consulted to assist with management    Bipolar depression [facility])  Assessment & Plan  Home: Bupropion 100mg BID, Cymbalta 60mg BID, Ativan 0.25mg BID PRN (however this has not been filled since January 2023 as per PDMP)  Here: Bupropion 100mg BID, Cymbalta 60mg BID  Consulting rehab psychology for support  Adjust as appropriate  Outpatient f/u with PCP    Severe protein-calorie malnutrition ([address])  Assessment & Plan  Malnutrition Findings:                                 BMI Findings: Body mass index is 21.79 kg/m². Consulted nutrition    Osteoporosis  Assessment & Plan  Hx L1 compression fx s/p kyphoplasty  Was previously on Prolia - last given in 2020   - Held due to dental issues  Recommend outpatient f/u with Endocrinology  2/23 Vitamin D level is 41.7    Anemia  Assessment & Plan  2/2 L leg hematoma (most recent imaging negative for extravasation)  At home on eliquis  No reversal inpatient due to recent CVA  Prior to admission Hgb 10-12. Ravi 7/2 6.8 - given 1 unit pRBC  7/6 Hgb 8.5  Monitor, transfuse as appropriate.   IM consulted to assist with management    CHF (congestive heart failure) ([address])  Assessment & Plan  Wt Readings from Last 3 Encounters:   07/07/23 55.8 kg (123 lb 0.3 oz)   06/30/23 55.3 kg (122 lb)   06/06/23 47.9 kg (105 lb 9.6 oz)     2/2023 Echo 30% with G2DD, moderate AI  Home: Toprol XL 25mg BID, Aldactone 25mg daily   - Limited by BP - no ACE/ARB   - no SGLT2i due to recurrent UTI  Here: Toprol XL 25mg BID, spironolactone 25mg daily   Unclear etiology of cardiomyopathy  Possible consideration for ischemic work-up outpatient, poor candidate for aggressive procedures   Monitor I/O/daily weights, volume status, lytes. IM consulted to assist with management  Outpatient f/u with Dr. Anthony Conner      Mixed stress and urge urinary incontinence  Assessment & Plan  Frankly incontinent here  Initiate timed voids and scans  Has history of recurrent UTIs - will check UA on admission here given extent of incontinence  Close continence care      Gastroesophageal reflux disease without esophagitis  Assessment & Plan  Home: PPI daily  Here: PPI daily    Seronegative arthropathy of multiple sites [facility])  Assessment & Plan  Follows with Dr. Leopoldo Espinoza with Rheum and is on Hydroxychloroquine 200mg BID. Continue home regimen and follow-up with rheumatology on discharge    Mild intermittent asthma without complication  Assessment & Plan  No acute exacerbation  Home: Albuterol PRN  Here: Same    Chronic pain syndrome  Assessment & Plan  Joint and back pain  Follows with Dr. Ena Reid with [facility]  Reviewed PDMP - at home on MSIR 7.5mg BID PRN. Here: Oxycodone 2.5-5mg PRN. - Possible transition back to home meds  - Outpatient f/u with Dr. Ena Reid. Health Maintenance  #Delirium/Sleep: At high risk. See cognitive impairment above. #Pain: Tylenol scheduled 650mg Q6hr, Gabapentin 400mg BID (will try to wean down here), Cymbalta 60mg BID, Oxycodone 2.5-5mg PRN  #Bowel: Last BM 7/7. PRN bisacodyl suppository/miralax  #Bladder: Voiding, incontinent. Timed voids Q4hr with PVR x3. Will also check UA on admission. #Skin/Pressure Injury Prevention: Turn Q2hr in bed, with weight shifts [id number] in wheelchair. Float heels in bed. #DVT Prophylaxis: Fully anticoagulated on eliquis 5mg BID, SCDs. #GI Prophylaxis: Protonix already for chronic issues.   #Code Status: Full Code  #FEN: Level 1/NTL, Ensure HP Pudding- Patsie Clos at Breakfast/Lunch/Dinner  #Dispo: ELOS 10-14 days with goals to discharge home at [facility]-mod Ind level of function     75 minutes or greater spent for this encounter which included a combination of face-to-face time with the patient and non-face-to-face time which in part specifically includes management of chronic medical issues, sequelae of current injury, sequelae of previous CVA. Face-to-face time included extended discussion with patient regarding current condition, medical history, mood, medical/rehabilitation management, and disposition. Non face-to-face time included coordination of care with patient's co-managing AP and/or physician(s) thru communication and review of their recent documentation as well as reviewing vitals, bowel/bladder function, recent labs, diagnostic imaging, and notes from therapy, CM, and nursing.      Drug regimen reviewed, all potential adverse effects identified and addressed:    Scheduled Meds:  Current Facility-Administered Medications   Medication Dose Route Frequency Provider Last Rate   • acetaminophen  650 mg Oral Q6H Justin Serna MD     • albuterol  2 puff Inhalation Q6H PRN Lawanda Lopez MD     • apixaban  5 mg Oral BID Lawanda Lopez MD     • buPROPion  100 mg Oral BID Lawanda Lopez MD     • [START ON 7/8/2023] cinacalcet  30 mg Oral Daily With Breakfast Lawanda Lopez MD     • DULoxetine  60 mg Oral BID Lawanda Lopez MD     • gabapentin  400 mg Oral BID Lawanda Lopez MD     • hydroxychloroquine  200 mg Oral BID With Meals Lawanda Lopez MD     • lamoTRIgine  150 mg Oral HS Lawanda Lopez MD     • magnesium Oxide  400 mg Oral BID Lawanda Lopez MD     • melatonin  3 mg Oral HS Lawanda Lopez MD     • metoprolol succinate  25 mg Oral BID Lawanda Lopez MD     • ondansetron  4 mg Oral Q6H PRN Lawanda Lopez MD     • oxyCODONE  5 mg Oral Q4H PRN Lawanda Lopez MD     • oxyCODONE  2.5 mg Oral Q4H PRN Lawanda Lopez MD     • [START ON 7/8/2023] pantoprazole  40 mg Oral Early Morning Lawanda Lopez MD     • prazosin  1 mg Oral HS Elizabeth Jeffrey MD     • senna  1 tablet Oral HS Elizabeth Jeffrey MD     • [START ON 7/8/2023] spironolactone  25 mg Oral Daily Elizabeth Jeffrey MD          Restrictions include: Fall precautions      Functional History/Home Set-up - Prior to Admission:    Lives in a [facility] with bed/bath on main level. 1 TAB  Has a RW  Lives with her son and brother. She is normally independent with ADLs, functional mobility, but requires assistance with IADLs. Functional Status Upon Admission to [facility]:  Mobility: Marika bed mobility, Marika ambulation with RW 15' x2   Transfers: Marika  ADLs: Sup grooming, Marika UB bathing, modA LB bathing, modA UB dressing, maxA LB dressing. Physical Exam:  Temp:  [96.5 °F (35.8 °C)-98.1 °F (36.7 °C)] 98.1 °F (36.7 °C)  HR:  [] 78  Resp:  [18-22] 18  BP: (110-130)/(66-81) 120/72  SpO2:  [94 %-99 %] 98 %    Gen: No acute distress, Well-nourished, well-appearing. HEENT: Moist mucus membranes, Normocephalic/Atraumatic  Cardiovascular: Regular rate, rhythm, S1/S2. Distal pulses palpable  Heme/Extr: No edema  Pulmonary: Non-labored breathing. Lungs CTAB  : No lopes  GI: Soft, non-tender, non-distended. BS+  MSK:PROM is WFL in all extremities. No effusions or deformities. Bulk is symmetric. See below for MMT scores. Large lateral thigh hematoma evolving. Pain with resisted hip flexion on the R. Integumentary: Skin is warm, dry. Neuro: AAOx3, Sensation altered on the L compared to the R to light touch. Speech is intact. Appropriate to questioning. Tone is normal. No fix.    MMT:   Strength:   Right  Left  Site  Right  Left  Site    5 4 S Ab: Shoulder Abductors  4  4  HF: Hip Flexors    5 5  EF: Elbow Flexors  5  4 KF: Knee Flexors    5  5  EE: Elbow Extensors  5  4  KE: Knee Extensors    5  5  WE: Wrist Extensors  5  5  DR: Dorsi Flexors    5  5  FF: Finger Flexors  5  5  PF: Plantar Flexors    5  5  HI: Hand Intrinsics  5  5  EHL: Extensor Hallucis Longus   Psych: Normal mood and affect. Laboratory:    Results from last 7 days   Lab Units 07/06/23  0534 07/05/23  0552 07/04/23  1311 07/04/23  0458   HEMOGLOBIN g/dL 8.5* 7.4* 7.9* 7.7*   HEMATOCRIT % 26.4* 22.5*  --  24.3*   WBC Thousand/uL 5.31 4.34  --  4.83     Results from last 7 days   Lab Units 07/05/23  0552 07/04/23  0458 07/03/23  0549   BUN mg/dL 7 7 9   SODIUM mmol/L 144 144 144   POTASSIUM mmol/L 3.6 3.7 3.6   CHLORIDE mmol/L 114* 116* 114*   CREATININE mg/dL 0.72 0.71 0.81     Results from last 7 days   Lab Units 07/01/23  0419 06/30/23  1340   PROTIME seconds 16.7* 19.5*   INR  1.33* 1.65*        Wt Readings from Last 1 Encounters:   07/07/23 55.8 kg (123 lb 0.3 oz)     Estimated body mass index is 21.79 kg/m² as calculated from the following:    Height as of this encounter: 5' 3" (1.6 m). Weight as of this encounter: 55.8 kg (123 lb 0.3 oz). Imaging: reviewed   6/30 CTH:  No acute intracranial pathology. Stable chronic right MCA infarct. 6/30 CT C-Spine:  No cervical spine fracture or traumatic malalignment. 6/30 CT CAP:  Large hematoma in the lateral left thigh measuring up to 9.6 cm with focus of active contrast extravasation. No acute intrathoracic or intra-abdominal injury. 7/2 CXR:  No acute cardiopulmonary disease. 7/4 CTA Pelvis:  1. Decreasing size of left lateral thigh hematoma without evidence for active extravasation. 2.  No CT findings in the visualized pelvis and thighs to explain falling hematocrit. 3.  Pancolitis with increasing inflammatory free fluid in the pelvis. No free air, pneumatosis, or encapsulated collections demonstrated. Rehabilitation Prognosis: good     Tolerance for three hours of therapy a day: good     Family/Patient Goals:  Patient/family's goals: Return to previous home/apartment. Patient will receive PT, OT, and ST 60 minutes each per day, five days per week to achieve rehab goals or participate in 900 minutes of therapy within a 7 day week period. Mobility Goals: [place]  Transfer Goals: [place]  Activities of Daily Living (ADLs) Goals: [place] - Sup    Discharge Planning:  Rehabilitation and discharge goals discussed with the patient and/or family. Case Managment and Social Work to review patient/family resources and to coordinate Discharge Planning. Estimated length of stay: 10 to 14 days    Patient and Family Education and Training:  Rehabilitation and discharge goals discussed with the patient and/or family. Patient/family education/training needs to be discussed in weekly team meeting.     Equipment/DME needs: Therapy teams to assess and evaluate for additional equipment/DME needs throughout rehabilitation stay    Past Medical History:   Past Surgical History:   Family History:   Social history:   Past Medical History:   Diagnosis Date   • Anxiety    • Asthma    • Bipolar depression ([address])    • Chronic narcotic dependence ([address])    • Chronic pain    • Depression    • Enterovirus heart infection    • SHAR (obstructive sleep apnea)    • Osteoarthritis    • Peripheral neuropathy    • Plantar fasciitis of right foot    • Senile osteoporosis    • Seronegative arthropathy of multiple sites ([address])    • Undifferentiated connective tissue disease ([address])     Past Surgical History:   Procedure Laterality Date   • BREAST IMPLANT     • FIXATION KYPHOPLASTY LUMBAR SPINE     • HYSTERECTOMY     • INCONTINENCE SURGERY N/A    • IR STROKE ALERT  2/12/2023   • NASAL SEPTOPLASTY W/ TURBINOPLASTY N/A    • RECTAL PROLAPSE REPAIR N/A    • REPAIR RECTOCELE     • RHINOPLASTY N/A      Family History   Problem Relation Age of Onset   • No Known Problems Mother    • No Known Problems Father    • Arthritis Family    • Diabetes Family    • Stroke Family       Social History     Socioeconomic History   • Marital status:      Spouse name: Not on file   • Number of children: Not on file   • Years of education: Not on file   • Highest education level: Not on file   Occupational History   • Not on file   Tobacco Use   • Smoking status: Former   • Smokeless tobacco: Never   Vaping Use   • Vaping Use: Never used   Substance and Sexual Activity   • Alcohol use: Not Currently     Comment: quit   • Drug use: Not Currently     Types: Marijuana     Comment: has medical card   • Sexual activity: Not on file   Other Topics Concern   • Not on file   Social History Narrative   • Not on file     Social Determinants of Health     Financial Resource Strain: Not on file   Food Insecurity: No Food Insecurity (7/3/2023)    Hunger Vital Sign    • Worried About Running Out of Food in the Last Year: Never true    • Ran Out of Food in the Last Year: Never true   Transportation Needs: No Transportation Needs (7/3/2023)    PRAPARE - Transportation    • Lack of Transportation (Medical): No    • Lack of Transportation (Non-Medical):  No   Physical Activity: Not on file   Stress: Not on file   Social Connections: Not on file   Intimate Partner Violence: Not on file   Housing Stability: Low Risk  (7/3/2023)    Housing Stability Vital Sign    • Unable to Pay for Housing in the Last Year: No    • Number of Places Lived in the Last Year: 1    • Unstable Housing in the Last Year: No          Current Medical Diagnosis Allergies   Patient Active Problem List   Diagnosis   • Chronic pain syndrome   • Mild intermittent asthma without complication   • Ambulatory dysfunction   • Seronegative arthropathy of multiple sites ([address])   • Gastroesophageal reflux disease without esophagitis   • Mixed stress and urge urinary incontinence   • CHF (congestive heart failure) ([address])   • Anemia   • Undifferentiated connective tissue disease ([address])   • Primary generalized (osteo)arthritis   • Peripheral neuropathy   • Osteoporosis   • Hx of compression fracture of spine   • Lumbar spondylosis   • Cervical spondylosis   • Closed fracture of multiple ribs of right side   • Traumatic pneumothorax   • Closed fracture of transverse process of lumbar vertebra [facility])   • Liver contusion   • Fall   • Acute pain due to trauma   • Right shoulder pain   • Severe protein-calorie malnutrition (HCC)   • Bipolar depression (HCC)   • Rheumatoid arthritis of multiple sites with negative rheumatoid factor (HCC)   • 'light-for-dates' infant with signs of fetal malnutrition   • LAILA (acute kidney injury) ([address])   • Polypharmacy   • Cardiomyopathy ([address])   • Vitamin D insufficiency   • Stroke ([address])   • HTN (hypertension)   • HFrEF (heart failure with reduced ejection fraction) (HCC)   • Hypercalcemia   • Discoloration of skin of foot   • Abdominal pain   • Acute CVA (cerebrovascular accident) ([address])   • History of bacteremia   • Obstructive sleep apnea   • History of stroke   • AMS (altered mental status)   • Gout of foot   • Dysphagia   • Wound of left ankle   • Hypokalemia   • Sinus tachycardia   • Hypomagnesemia   • Insomnia   • Nocturnal hypoxia   • Stroke-like symptoms   • Cerebral aneurysm, nonruptured   • Leg hematoma, left, initial encounter    Allergies   Allergen Reactions   • Cephalosporins Other (See Comments)     rash   • Iodine - Food Allergy Anaphylaxis     Anaphylaxis     • Methocarbamol Hives   • Sulfa Antibiotics Hives   • Bactrim [Sulfamethoxazole-Trimethoprim] Hives   • Erythromycin Hives   • Penicillins    • Shrimp Extract Allergy Skin Test - Food Allergy Other (See Comments)   • Trimethoprim Hives           Medical Necessity Criteria for [facility] Admission: Mod risk: edical history of HTN, CVA in 2/2023 on eliquis (chronic dysphagia, L sided weakness, cognitive impairment), hypercalcemia,  CHF, GERD, SHAR, gout, bipolar disorder, RA on plaquenil, chronic opioid dependence on morphine for chronic back/joint pain, , L hematoma, risk of repeat bleed, impulsivity/impaired safety awareness increased risk of falls, risk of delirium, risk of polypharmacy, risk of VTE/bleed, risk of infection.  . In addition, the preadmission screen, post-admission physical evaluation, overall plan of care and admissions order demonstrate a reasonable expectation that the following criteria were met at the time of admission to the [facility]. 1. The patient requires active and ongoing therapeutic intervention of multiple therapy disciplines (physical therapy, occupational therapy, speech-language pathology, or prosthetics/orthotics), one of which is physical or occupational therapy. 2. Patient requires an intensive rehabilitation therapy program, as defined in Chapter 1, section 110.2.2 of the CMS Medicare Policy Manual. This intensive rehabilitation therapy program will consist of at least 3 hours of therapy per day at least 5 days per week or at least 15 hours of intensive rehabilitation therapy within a 7 consecutive day period, beginning with the date of admission to the [facility]. 3. The patient is reasonably expected to actively participate in, and benefit significantly from, the intensive rehabilitation therapy program as defined in Chapter 1, section 110.2.2 of the CMS Medicare Policy Manual at this time of admission to the [facility]. She can reasonably be expected to make measurable improvement (that will be of practical value to improve the patient’s functional capacity or adaptation to impairments) as a result of the rehabilitation treatment, as defined in section 110.3, and such improvement can be expected to be made within the prescribed period of time. As noted in the CMS Medicare Policy Manual, the patient need not be expected to achieve complete independence in the domain of self-care nor be expected to return to his or her prior level of functioning in order to meet this standard. 4. The patient must require physician supervision by a rehabilitation physician.  As such, a rehabilitation physician will conduct face-to-face visits with the patient at least 3 days per week throughout the patient’s stay in the [facility] to assess the patient both medically and functionally, as well as to modify the course of treatment as needed to maximize the patient’s capacity to benefit from the rehabilitation process. 5. The patient requires an intensive and coordinated interdisciplinary approach to providing rehabilitation, as defined in Chapter 1, section 110.2.5 of the CMS Medicare Policy Manual. This will be achieved through periodic team conferences, conducted at least once in a 7-day period, and comprising of an interdisciplinary team of medical professionals consisting of: a rehabilitation physician, registered nurse,  and/or , and a licensed/certified therapist from each therapy discipline involved in treating the patient. Ronal Kinney MD  Physical Medicine and Rehabilitation    ** Please Note: Fluency Direct voice to text software may have been used in the creation of this document.  **

## 2023-07-07 NOTE — ASSESSMENT & PLAN NOTE
Suspect 2/2 primary hyperparathyroidism  7/8 iCal 1.12, Phos 3.3  Home: Sensipar 30mg daily  Here: Same  Recommend outpatient f/u with Endocrinology

## 2023-07-07 NOTE — ASSESSMENT & PLAN NOTE
Joint and back pain  Follows with Dr. Ena Reid with [facility]  Reviewed PDMP - at home on MSIR 7.5mg TID PRN. Here: MSIR 7.5mg TID PRN - currently only using twice daily  - Outpatient f/u with Dr. Ena Reid. - Reached out to his office. PDMP reviewed - last filled 6/30 prior to admission   - If she runs out sooner, family can call and they will refill   - They have follow-up arranged for 8/29, and put her on the wait list as well to be seen sooner if possible.

## 2023-07-07 NOTE — ASSESSMENT & PLAN NOTE
MRI brain 2/22 - 1.  New small acute /more recent interval lacunar infarction in the right cerebral peduncle (series 4, image 14). 2.  Extensive multifocal large, previously present right middle cerebral artery infarction which is evolving with superimposed areas of evolving hemorrhage likely related to hemorrhagic transformation of subacute right MCA infarction.  No significant   mass effect. 3.  Mild, chronic microangiopathy. S/P thrombectomy 2/12 by Dr Marielle Gaming   Secondary stroke prophylaxis at this time with Eliquis 5 mg twice daily    - Per previous notes, strong suspicion for cardioembolic origin   - Has not had Ziopatch/Loop recorder placed   - Will need f/u with Cardiology   - Unfortunately, Eliquis price check was > $500, Xarelto was > $500, Pradaxa  Was $203 for brand and $175 for generic.    - Plan will be to go home with Eliquis for 1 month free, and then after that month, family can decide if they want to do Pradaxa, or go with Warfarin. PT, OT, SLP.    Review modifiable risk factors and provide stroke education  Monitor for poststroke pain  Eliquis for secondary stroke prophylaxis    Outpatient f/u with Neurology

## 2023-07-07 NOTE — PLAN OF CARE
Problem: SAFETY ADULT  Goal: Maintain or return to baseline ADL function  Description: INTERVENTIONS:  -  Assess patient's ability to carry out ADLs; assess patient's baseline for ADL function and identify physical deficits which impact ability to perform ADLs (bathing, care of mouth/teeth, toileting, grooming, dressing, etc.)  - Assess/evaluate cause of self-care deficits   - Assess range of motion  - Assess patient's mobility; develop plan if impaired  - Assess patient's need for assistive devices and provide as appropriate  - Encourage maximum independence but intervene and supervise when necessary  - Involve family in performance of ADLs  - Assess for home care needs following discharge   - Consider OT consult to assist with ADL evaluation and planning for discharge  - Provide patient education as appropriate  Outcome: Progressing  Goal: Maintains/Returns to pre admission functional level  Description: INTERVENTIONS:  - Perform BMAT or MOVE assessment daily.   - Set and communicate daily mobility goal to care team and patient/family/caregiver. - Collaborate with rehabilitation services on mobility goals if consulted  - Perform Range of Motion  times a day. - Reposition patient every  hours.   - Dangle patient  times a day  - Stand patient  times a day  - Ambulate patient  times a day  - Out of bed to chair  times a day   - Out of bed for meals  times a day  - Out of bed for toileting  - Record patient progress and toleration of activity level   Outcome: Progressing  Goal: Patient will remain free of falls  Description: INTERVENTIONS:  - Educate patient/family on patient safety including physical limitations  - Instruct patient to call for assistance with activity   - Consult OT/PT to assist with strengthening/mobility   - Keep Call bell within reach  - Keep bed low and locked with side rails adjusted as appropriate  - Keep care items and personal belongings within reach  - Initiate and maintain comfort rounds  - Make Fall Risk Sign visible to staff  - Offer Toileting every  Hours, in advance of need  - Initiate/Maintain alarm  - Obtain necessary fall risk management equipment:   - Apply yellow socks and bracelet for high fall risk patients  - Consider moving patient to room near nurses station  Outcome: Progressing     Problem: Knowledge Deficit  Goal: Patient/family/caregiver demonstrates understanding of disease process, treatment plan, medications, and discharge instructions  Description: Complete learning assessment and assess knowledge base. Interventions:  - Provide teaching at level of understanding  - Provide teaching via preferred learning methods  Outcome: Progressing     Problem: DISCHARGE PLANNING  Goal: Discharge to home or other facility with appropriate resources  Description: INTERVENTIONS:  - Identify barriers to discharge w/patient and caregiver  - Arrange for needed discharge resources and transportation as appropriate  - Identify discharge learning needs (meds, wound care, etc.)  - Arrange for interpretive services to assist at discharge as needed  - Refer to Case Management Department for coordinating discharge planning if the patient needs post-hospital services based on physician/advanced practitioner order or complex needs related to functional status, cognitive ability, or social support system  Outcome: Progressing     Problem: Nutrition/Hydration-ADULT  Goal: Nutrient/Hydration intake appropriate for improving, restoring or maintaining nutritional needs  Description: Monitor and assess patient's nutrition/hydration status for malnutrition. Collaborate with interdisciplinary team and initiate plan and interventions as ordered. Monitor patient's weight and dietary intake as ordered or per policy. Utilize nutrition screening tool and intervene as necessary. Determine patient's food preferences and provide high-protein, high-caloric foods as appropriate.      INTERVENTIONS:  - Monitor oral intake, urinary output, labs, and treatment plans  - Assess nutrition and hydration status and recommend course of action  - Evaluate amount of meals eaten  - Assist patient with eating if necessary   - Allow adequate time for meals  - Recommend/ encourage appropriate diets, oral nutritional supplements, and vitamin/mineral supplements  - Order, calculate, and assess calorie counts as needed  - Recommend, monitor, and adjust tube feedings and TPN/PPN based on assessed needs  - Assess need for intravenous fluids  - Provide specific nutrition/hydration education as appropriate  - Include patient/family/caregiver in decisions related to nutrition  Outcome: Progressing

## 2023-07-07 NOTE — ASSESSMENT & PLAN NOTE
Tends to run on the low/normal side. Home: Toprol XL 25mg daily  Here: Toprol XL 12.5mg daily at bedtime. Has not been able to get meds at times due to not meeting parameters.    Monitor and adjust as appropriate  - Spironolactone held 7/15 and d/c 7/16  IM consulted to assist with management

## 2023-07-07 NOTE — ASSESSMENT & PLAN NOTE
Home: Bupropion 100mg BID, Cymbalta 60mg BID, Ativan 0.25mg BID PRN (however this has not been filled since January 2023 as per PDMP)  Here: Bupropion 100mg BID, Cymbalta 60mg BID  Consulting rehab psychology for support  Adjust as appropriate  Outpatient f/u with PCP

## 2023-07-07 NOTE — ASSESSMENT & PLAN NOTE
Noted on admission.  Now resolved  7/7 started on nystatin swish/spit  - 7/11 improving  - Continued for at least 7 days with 2-3 days after her antibiotics stop

## 2023-07-07 NOTE — DISCHARGE SUMMARY
[address] [facility]  Discharge- Quirino Nash 1955, 70 y.o. female MRN: [id number]  Unit/Bed#: [facility] 627-01 Encounter: [id number]  Primary Care Provider: Hillary Christopher DO   Date and time admitted to hospital: 6/30/2023  7:36 PM    * Leg hematoma, left, initial encounter  Assessment & Plan  - home Eliquis held on admission but did not attempt reversal due to recent stroke. Eliquis resumed on 7/3.   - Hgb with slow down-trend from 9 to mid 7s on 7/4 when repeat CT RLE with contrast was obtained and showed decreased size of hematoma with no active exravasation.   - Hgb stable at 8.5. No signs of ongoing bleeding clinically. - Hgb 6.8 on 7/2, 1 u pRBC ordered for transfusion  - F/u with trauma in 2 weeks and continue Eliquis on discharge. Stroke [facility])  Assessment & Plan  - R MCA stroke s/p mechanical thrombectomy in February 2023 with residual L sided weakness, dysphasia and dysphagia  - Eliquis resumed 7/3   - continue to follow up with neurology as an outpatient  - continue PT/OT and speech therapy outpatient    Dysphagia  Assessment & Plan  - Chronic dysphagia  - speech consult placed- dysphagia 1 diet with thin liquids  - patient is tolerating current diet    Hypercalcemia  Assessment & Plan  - Continue home cinacalcet   - outpatient f/u with PCP    Bipolar depression ([address])  Assessment & Plan  - Continue home wellbutrin, cymbalta     CHF (congestive heart failure) ([facility])  Assessment & Plan  Wt Readings from Last 3 Encounters:   06/30/23 55.3 kg (122 lb)   06/06/23 47.9 kg (105 lb 9.6 oz)   05/01/23 56.8 kg (125 lb 3.2 oz)     - ECHO 2/2023 with EF 30%, Grade II diastolic dysfunction, moderate aortic regurgitation  - continue home metoprolol and aldactone. Continue metoprolol succinate at home dose 25 mg BID.    - no evidence of acute exacerbation at this time  - f/u with heart failure team as an outpatient as previously scheduled              Medical Problems     Resolved Problems  Date Reviewed: 7/6/2023   None         Admission Date:   Admission Orders (From admission, onward)     Ordered        07/01/23 1128  Inpatient Admission  Once            06/30/23 2112  Place in Observation  Once                        Admitting Diagnosis: Hematoma of leg, left, initial encounter [S80.12XA]  Unspecified multiple injuries, initial encounter [T07. XXXA]    HPI: As documented by Dr. Selma Shrestha who evaluated the patient on admission, "Juana Perez is a 70 y.o. female w PMH HTN, hx of CVA on Eliquis, CHF, GERD, SHAR, gout, bipolar depression, RA, presenting as a trauma transfer from [facility] after mechanical fall down her front porch stairs earlier today, found to have left leg/left hip hematoma with evidence of active extravasation on CT imaging. Patient is poor historian and not able to give much detail of the event but apparently this morning had gotten up thinking she was opening the door to the bathroom when she opened her front door and fell down the front porch stairs, hit and landed on her left hip/left side with immediate pain to the area, she does not think she hit her head, denies loss of consciousness, does take Eliquis for history of recent stroke in February 2023. Patient apparently had hypotension with systolics in the 01I in triage at [facility] but has since normalized. Also of note patient has history of difficulty with ambulation and multiple prior falls with multiple prior/healing injuries seen on imaging, patient states she usually requires assistance with ambulation. Has left sided deficits from right sided stroke in February. Reports pain in the left hip/left leg, denies any numbness or tingling. GCS 15 on evaluation, hemodynamically stable."    Procedures Performed: No orders of the defined types were placed in this encounter. Summary of Hospital Course: Patient was placed on the trauma service with a L thigh hematoma.  Her eliquis was held and compression was applied to the hematoma. Her Hgb stablized and her Eliquis was resumed on 7/3 due to recent stroke with deficits in 2/2023. She then had a drift in her Hgb to the mid 7s. She underwent repeat CTA pelvis on 7/4 which showed decreased L thigh hematoma with no active extravasation. Her Eliquis was continued and Hgb stabilized at 8.5. She was up with PT/OT who recommended inpatient rehab. Her pain was controlled. Speech continued to follow her and she is on a pureed diet with NTL. She can undergo repeat VBS at indicated at rehab. She will d/c to [facility] at [facility] today. Today she is feeling well. Denies CP/SOB. She is tolerating a diet and eating an drinking well. She is motivated to go to rehab. Exam:  Vitals:    07/07/23 0708   BP: 116/69   Pulse: 84   Resp: 18   Temp: 97.6 °F (36.4 °C)   SpO2: 99%     GEN: NAD  HEENT: NCAT  NEURO: GCS 15, + Chronic L sided weakness, stable  CV: RRR, no MGR  PULM: CTA bilaterally  GI: soft,non-tender,non-distended  : voiding  MSK: + L thigh hematoma soft/stable. Ecchymosis resolving. NVI distally in LLE. SKIN:  [place], warm dry    Significant Findings, Care, Treatment and Services Provided:   CTA pelvis w wo contrast    Result Date: 7/4/2023  Impression: 1. Decreasing size of left lateral thigh hematoma without evidence for active extravasation. 2.  No CT findings in the visualized pelvis and thighs to explain falling hematocrit. 3.  Pancolitis with increasing inflammatory free fluid in the pelvis. No free air, pneumatosis, or encapsulated collections demonstrated. The study was marked in [facility] for immediate notification. Workstation performed: [id number]     XR chest 1 view portable    Result Date: 7/2/2023  Impression: No acute cardiopulmonary disease. Workstation performed: [id number]     CT head without contrast    Result Date: 6/30/2023  Impression: No acute intracranial pathology. Stable chronic right MCA infarct.  Workstation performed: [id number]     CT cervical spine without contrast    Result Date: 6/30/2023  Impression: No cervical spine fracture or traumatic malalignment. Workstation performed: [id number]     CT chest abdomen pelvis w contrast    Result Date: 6/30/2023  Impression: Large hematoma in the lateral left thigh measuring up to 9.6 cm with focus of active contrast extravasation. No acute intrathoracic or intra-abdominal injury. I personally discussed this study with [place] on 6/30/2023 3:37 PM. Workstation performed: [id number]     Complications: none    Condition at Discharge: good         Discharge instructions/Information to patient and family:   See after visit summary for information provided to patient and family. Provisions for Follow-Up Care:  See after visit summary for information related to follow-up care and any pertinent home health orders. PCP: Matthew Sinha DO    Disposition: Short-term rehab at [address] Readmission: No    Discharge Statement   I spent 25 minutes discharging the patient. This time was spent on the day of discharge. I had direct contact with the patient on the day of discharge. Additional documentation is required if more than 30 minutes were spent on discharge. Discharge Medications:  See after visit summary for reconciled discharge medications provided to patient and family.

## 2023-07-07 NOTE — PLAN OF CARE
Problem: PAIN - ADULT  Goal: Verbalizes/displays adequate comfort level or baseline comfort level  Description: Interventions:  - Encourage patient to monitor pain and request assistance  - Assess pain using appropriate pain scale  - Administer analgesics based on type and severity of pain and evaluate response  - Implement non-pharmacological measures as appropriate and evaluate response  - Consider cultural and social influences on pain and pain management  - Notify physician/advanced practitioner if interventions unsuccessful or patient reports new pain  Outcome: Progressing     Problem: INFECTION - ADULT  Goal: Absence or prevention of progression during hospitalization  Description: INTERVENTIONS:  - Assess and monitor for signs and symptoms of infection  - Monitor lab/diagnostic results  - Monitor all insertion sites, i.e. indwelling lines, tubes, and drains  - Monitor endotracheal if appropriate and nasal secretions for changes in amount and color  - [place] appropriate cooling/warming therapies per order  - Administer medications as ordered  - Instruct and encourage patient and family to use good hand hygiene technique  - Identify and instruct in appropriate isolation precautions for identified infection/condition  Outcome: Progressing  Goal: Absence of fever/infection during neutropenic period  Description: INTERVENTIONS:  - Monitor WBC    Outcome: Progressing     Problem: SAFETY ADULT  Goal: Patient will remain free of falls  Description: INTERVENTIONS:  - Educate patient/family on patient safety including physical limitations  - Instruct patient to call for assistance with activity   - Consult OT/PT to assist with strengthening/mobility   - Keep Call bell within reach  - Keep bed low and locked with side rails adjusted as appropriate  - Keep care items and personal belongings within reach  - Initiate and maintain comfort rounds  - Make Fall Risk Sign visible to staff  - Offer Toileting every 2 Hours, in advance of need  - Initiate/Maintain bed alarm  - Obtain necessary fall risk management equipment: bed alarm  - Apply yellow socks and bracelet for high fall risk patients  - Consider moving patient to room near nurses station  Outcome: Progressing  Goal: Maintain or return to baseline ADL function  Description: INTERVENTIONS:  -  Assess patient's ability to carry out ADLs; assess patient's baseline for ADL function and identify physical deficits which impact ability to perform ADLs (bathing, care of mouth/teeth, toileting, grooming, dressing, etc.)  - Assess/evaluate cause of self-care deficits   - Assess range of motion  - Assess patient's mobility; develop plan if impaired  - Assess patient's need for assistive devices and provide as appropriate  - Encourage maximum independence but intervene and supervise when necessary  - Involve family in performance of ADLs  - Assess for home care needs following discharge   - Consider OT consult to assist with ADL evaluation and planning for discharge  - Provide patient education as appropriate  Outcome: Progressing  Goal: Maintains/Returns to pre admission functional level  Description: INTERVENTIONS:  - Perform BMAT or MOVE assessment daily.   - Set and communicate daily mobility goal to care team and patient/family/caregiver. - Collaborate with rehabilitation services on mobility goals if consulted  - Perform Range of Motion 3 times a day. - Reposition patient every 2 hours.   - Dangle patient 3 times a day  - Stand patient 3 times a day  - Ambulate patient 3 times a day  - Out of bed to chair 3 times a day   - Out of bed for meals 3 times a day  - Out of bed for toileting  - Record patient progress and toleration of activity level   Outcome: Progressing     Problem: DISCHARGE PLANNING  Goal: Discharge to home or other facility with appropriate resources  Description: INTERVENTIONS:  - Identify barriers to discharge w/patient and caregiver  - Arrange for needed discharge resources and transportation as appropriate  - Identify discharge learning needs (meds, wound care, etc.)  - Arrange for interpretive services to assist at discharge as needed  - Refer to Case Management Department for coordinating discharge planning if the patient needs post-hospital services based on physician/advanced practitioner order or complex needs related to functional status, cognitive ability, or social support system  Outcome: Progressing     Problem: Prexisting or High Potential for Compromised Skin Integrity  Goal: Skin integrity is maintained or improved  Description: INTERVENTIONS:  - Identify patients at risk for skin breakdown  - Assess and monitor skin integrity  - Assess and monitor nutrition and hydration status  - Monitor labs   - Assess for incontinence   - Turn and reposition patient  - Assist with mobility/ambulation  - Relieve pressure over bony prominences  - Avoid friction and shearing  - Provide appropriate hygiene as needed including keeping skin clean and dry  - Evaluate need for skin moisturizer/barrier cream  - Collaborate with interdisciplinary team   - Patient/family teaching  - Consider wound care consult   Outcome: Progressing

## 2023-07-07 NOTE — TREATMENT PLAN
Individualized Plan of [address] 70 y.o. female MRN: [id number]  Unit/Bed#: -01 Encounter: [id number]     PATIENT INFORMATION  ADMISSION DATE: 7/7/2023 11:57 AM [place] CATEGORY:Other Disabling Impairments:  13  Other Disabling Impairments   ADMISSION DIAGNOSIS: Hematoma of leg, left, initial encounter [S80.12XA]  Hematoma of left hip [S70.02XA]  EXPECTED LOS: 10 to 14 days     MEDICAL/FUNCTIONAL PROGNOSIS  Based on my assessment of the patient's medical conditions and current functional status, the prognosis for attaining medical and functional goals or the IRF stay is:  Fair    Medical Goals: Patient will be medically stable for discharge to least restrictive envrionment upon completion of rehab program      1. Adequate pain control  2. Prevention of VTE  3. Adequate secretion management, and monitoring of respiratory status  4. Bowel/bladder management  5. Maintain appropriate nutrition and hydration for healing and hemodynamic stability  6. Emotional adaptation to impairment  7. Monitor for polypharmacy  8. Fall prevention  9. Patient and family caregiver training/education  10. Skin protection and prevention of pressure injury  11. Appropriate management of chronic comorbidities. 12. Prevention of delirium    ANTICIPATED DISCHARGE DISPOSITION AND SERVICES  COMMUNITY SETTING: Home, may require intermittent assistance. ANTICIPATED FOLLOW-UP SERVICE:   Home Health Services: PT, OT, SLP and Nursing    DISCIPLINE SPECIFIC PLANS:  Required Disciplines & Services: Rehabillitation Nursing and Psychology      REQUIRED THERAPY:  Therapy Hours per Day Days per Week Total Days   Physical Therapy 1 5-6 5-6   Occupational Therapy 1 5-6 5-6   Speech/Language Therapy 1 3-5 3-5   NOTE: Additional therapy time(s) or changes to allocation of therapies as appropriate to meet patient needs and to achieve functional goals.       Patient will participate in above therapy regimen consisting of PT, OT and SLP due to the following medical procedure/condition:Other Disabling Impairments:  13  Other Disabling Impairments    ANTICIPATED FUNCTIONAL OUTCOMES:  ADL:   Sup-Marika    Bladder/Bowel:  mod Ind   Transfers:   mod Ind   Locomotion:  mod ind   Cognitive:  premorbid      DISCHARGE PLANNING NEEDS  Equipment needs: Discharge needs to be reviewed with team      REHAB ANTICIPATED PARTICIPATION RESTRICTIONS:  Required some assistance at home

## 2023-07-08 LAB
ALBUMIN SERPL BCP-MCNC: 2.4 G/DL (ref 3.5–5)
ALP SERPL-CCNC: 54 U/L (ref 46–116)
ALT SERPL W P-5'-P-CCNC: 21 U/L (ref 12–78)
AMORPH URATE CRY URNS QL MICRO: ABNORMAL
ANION GAP SERPL CALCULATED.3IONS-SCNC: 5 MMOL/L
AST SERPL W P-5'-P-CCNC: 15 U/L (ref 5–45)
BACTERIA UR QL AUTO: ABNORMAL /HPF
BASOPHILS # BLD AUTO: 0.03 THOUSANDS/ÂΜL (ref 0–0.1)
BASOPHILS NFR BLD AUTO: 1 % (ref 0–1)
BILIRUB SERPL-MCNC: 0.74 MG/DL (ref 0.2–1)
BILIRUB UR QL STRIP: NEGATIVE
BUN SERPL-MCNC: 15 MG/DL (ref 5–25)
CA-I BLD-SCNC: 1.12 MMOL/L (ref 1.12–1.32)
CALCIUM ALBUM COR SERPL-MCNC: 9.6 MG/DL (ref 8.3–10.1)
CALCIUM SERPL-MCNC: 8.3 MG/DL (ref 8.3–10.1)
CAOX CRY URNS QL MICRO: ABNORMAL /HPF
CHLORIDE SERPL-SCNC: 114 MMOL/L (ref 96–108)
CLARITY UR: ABNORMAL
CO2 SERPL-SCNC: 25 MMOL/L (ref 21–32)
COLOR UR: YELLOW
CREAT SERPL-MCNC: 0.88 MG/DL (ref 0.6–1.3)
EOSINOPHIL # BLD AUTO: 0.17 THOUSAND/ÂΜL (ref 0–0.61)
EOSINOPHIL NFR BLD AUTO: 3 % (ref 0–6)
ERYTHROCYTE [DISTWIDTH] IN BLOOD BY AUTOMATED COUNT: 18 % (ref 11.6–15.1)
GFR SERPL CREATININE-BSD FRML MDRD: 67 ML/MIN/1.73SQ M
GLUCOSE P FAST SERPL-MCNC: 87 MG/DL (ref 65–99)
GLUCOSE SERPL-MCNC: 87 MG/DL (ref 65–140)
GLUCOSE UR STRIP-MCNC: NEGATIVE MG/DL
HCT VFR BLD AUTO: 29.2 % (ref 34.8–46.1)
HGB BLD-MCNC: 9.2 G/DL (ref 11.5–15.4)
HGB UR QL STRIP.AUTO: NEGATIVE
IMM GRANULOCYTES # BLD AUTO: 0.05 THOUSAND/UL (ref 0–0.2)
IMM GRANULOCYTES NFR BLD AUTO: 1 % (ref 0–2)
KETONES UR STRIP-MCNC: NEGATIVE MG/DL
LEUKOCYTE ESTERASE UR QL STRIP: ABNORMAL
LYMPHOCYTES # BLD AUTO: 1.34 THOUSANDS/ÂΜL (ref 0.6–4.47)
LYMPHOCYTES NFR BLD AUTO: 20 % (ref 14–44)
MAGNESIUM SERPL-MCNC: 1.9 MG/DL (ref 1.6–2.6)
MCH RBC QN AUTO: 30.1 PG (ref 26.8–34.3)
MCHC RBC AUTO-ENTMCNC: 31.5 G/DL (ref 31.4–37.4)
MCV RBC AUTO: 95 FL (ref 82–98)
MONOCYTES # BLD AUTO: 0.62 THOUSAND/ÂΜL (ref 0.17–1.22)
MONOCYTES NFR BLD AUTO: 10 % (ref 4–12)
MUCOUS THREADS UR QL AUTO: ABNORMAL
NEUTROPHILS # BLD AUTO: 4.35 THOUSANDS/ÂΜL (ref 1.85–7.62)
NEUTS SEG NFR BLD AUTO: 65 % (ref 43–75)
NITRITE UR QL STRIP: NEGATIVE
NON-SQ EPI CELLS URNS QL MICRO: ABNORMAL /HPF
NRBC BLD AUTO-RTO: 0 /100 WBCS
PH UR STRIP.AUTO: 7 [PH]
PHOSPHATE SERPL-MCNC: 3.3 MG/DL (ref 2.3–4.1)
PLATELET # BLD AUTO: 279 THOUSANDS/UL (ref 149–390)
PMV BLD AUTO: 10 FL (ref 8.9–12.7)
POTASSIUM SERPL-SCNC: 3.2 MMOL/L (ref 3.5–5.3)
PROT SERPL-MCNC: 5.1 G/DL (ref 6.4–8.4)
PROT UR STRIP-MCNC: ABNORMAL MG/DL
RBC # BLD AUTO: 3.06 MILLION/UL (ref 3.81–5.12)
RBC #/AREA URNS AUTO: ABNORMAL /HPF
SODIUM SERPL-SCNC: 144 MMOL/L (ref 135–147)
SP GR UR STRIP.AUTO: 1.02 (ref 1–1.03)
UROBILINOGEN UR STRIP-ACNC: 2 MG/DL
WBC # BLD AUTO: 6.56 THOUSAND/UL (ref 4.31–10.16)
WBC #/AREA URNS AUTO: ABNORMAL /HPF

## 2023-07-08 PROCEDURE — 84100 ASSAY OF PHOSPHORUS: CPT | Performed by: PHYSICAL MEDICINE & REHABILITATION

## 2023-07-08 PROCEDURE — 85025 COMPLETE CBC W/AUTO DIFF WBC: CPT | Performed by: PHYSICAL MEDICINE & REHABILITATION

## 2023-07-08 PROCEDURE — 97530 THERAPEUTIC ACTIVITIES: CPT

## 2023-07-08 PROCEDURE — 92610 EVALUATE SWALLOWING FUNCTION: CPT

## 2023-07-08 PROCEDURE — 99232 SBSQ HOSP IP/OBS MODERATE 35: CPT | Performed by: PHYSICAL MEDICINE & REHABILITATION

## 2023-07-08 PROCEDURE — 80053 COMPREHEN METABOLIC PANEL: CPT | Performed by: PHYSICAL MEDICINE & REHABILITATION

## 2023-07-08 PROCEDURE — 97535 SELF CARE MNGMENT TRAINING: CPT

## 2023-07-08 PROCEDURE — 87086 URINE CULTURE/COLONY COUNT: CPT | Performed by: PHYSICAL MEDICINE & REHABILITATION

## 2023-07-08 PROCEDURE — 87077 CULTURE AEROBIC IDENTIFY: CPT | Performed by: PHYSICAL MEDICINE & REHABILITATION

## 2023-07-08 PROCEDURE — 87186 SC STD MICRODIL/AGAR DIL: CPT | Performed by: PHYSICAL MEDICINE & REHABILITATION

## 2023-07-08 PROCEDURE — 83735 ASSAY OF MAGNESIUM: CPT | Performed by: PHYSICAL MEDICINE & REHABILITATION

## 2023-07-08 PROCEDURE — 82330 ASSAY OF CALCIUM: CPT | Performed by: PHYSICAL MEDICINE & REHABILITATION

## 2023-07-08 PROCEDURE — 81001 URINALYSIS AUTO W/SCOPE: CPT | Performed by: PHYSICAL MEDICINE & REHABILITATION

## 2023-07-08 PROCEDURE — 87147 CULTURE TYPE IMMUNOLOGIC: CPT | Performed by: PHYSICAL MEDICINE & REHABILITATION

## 2023-07-08 PROCEDURE — 97167 OT EVAL HIGH COMPLEX 60 MIN: CPT

## 2023-07-08 PROCEDURE — 97163 PT EVAL HIGH COMPLEX 45 MIN: CPT

## 2023-07-08 PROCEDURE — 99232 SBSQ HOSP IP/OBS MODERATE 35: CPT | Performed by: INTERNAL MEDICINE

## 2023-07-08 RX ORDER — POTASSIUM CHLORIDE 20 MEQ/1
40 TABLET, EXTENDED RELEASE ORAL 2 TIMES DAILY
Status: COMPLETED | OUTPATIENT
Start: 2023-07-08 | End: 2023-07-08

## 2023-07-08 RX ORDER — PRAZOSIN HYDROCHLORIDE 1 MG/1
1 CAPSULE ORAL
Status: DISCONTINUED | OUTPATIENT
Start: 2023-07-08 | End: 2023-07-09

## 2023-07-08 RX ORDER — METOPROLOL SUCCINATE 25 MG/1
25 TABLET, EXTENDED RELEASE ORAL 2 TIMES DAILY
Status: DISCONTINUED | OUTPATIENT
Start: 2023-07-08 | End: 2023-07-15

## 2023-07-08 RX ADMIN — MAGNESIUM OXIDE TAB 400 MG (241.3 MG ELEMENTAL MG) 400 MG: 400 (241.3 MG) TAB at 17:37

## 2023-07-08 RX ADMIN — ACETAMINOPHEN 650 MG: 325 TABLET, FILM COATED ORAL at 20:58

## 2023-07-08 RX ADMIN — BUPROPION HYDROCHLORIDE 100 MG: 100 TABLET, FILM COATED ORAL at 11:58

## 2023-07-08 RX ADMIN — DULOXETINE HYDROCHLORIDE 60 MG: 60 CAPSULE, DELAYED RELEASE ORAL at 17:37

## 2023-07-08 RX ADMIN — ACETAMINOPHEN 650 MG: 325 TABLET, FILM COATED ORAL at 05:31

## 2023-07-08 RX ADMIN — MAGNESIUM OXIDE TAB 400 MG (241.3 MG ELEMENTAL MG) 400 MG: 400 (241.3 MG) TAB at 11:53

## 2023-07-08 RX ADMIN — HYDROXYCHLOROQUINE SULFATE 200 MG: 200 TABLET ORAL at 17:37

## 2023-07-08 RX ADMIN — BUPROPION HYDROCHLORIDE 100 MG: 100 TABLET, FILM COATED ORAL at 17:37

## 2023-07-08 RX ADMIN — HYDROXYCHLOROQUINE SULFATE 200 MG: 200 TABLET ORAL at 11:55

## 2023-07-08 RX ADMIN — CINACALCET 30 MG: 30 TABLET, FILM COATED ORAL at 11:56

## 2023-07-08 RX ADMIN — PANTOPRAZOLE SODIUM 40 MG: 40 TABLET, DELAYED RELEASE ORAL at 05:31

## 2023-07-08 RX ADMIN — NYSTATIN 500000 UNITS: 100000 SUSPENSION ORAL at 17:37

## 2023-07-08 RX ADMIN — NYSTATIN 500000 UNITS: 100000 SUSPENSION ORAL at 21:07

## 2023-07-08 RX ADMIN — DULOXETINE HYDROCHLORIDE 60 MG: 60 CAPSULE, DELAYED RELEASE ORAL at 11:53

## 2023-07-08 RX ADMIN — MELATONIN TAB 3 MG 3 MG: 3 TAB at 20:59

## 2023-07-08 RX ADMIN — LAMOTRIGINE 150 MG: 100 TABLET ORAL at 21:06

## 2023-07-08 RX ADMIN — POTASSIUM CHLORIDE 40 MEQ: 1500 TABLET, EXTENDED RELEASE ORAL at 17:37

## 2023-07-08 RX ADMIN — NYSTATIN 500000 UNITS: 100000 SUSPENSION ORAL at 13:02

## 2023-07-08 RX ADMIN — GABAPENTIN 400 MG: 400 CAPSULE ORAL at 11:54

## 2023-07-08 RX ADMIN — OXYCODONE HYDROCHLORIDE 5 MG: 5 TABLET ORAL at 14:26

## 2023-07-08 RX ADMIN — POTASSIUM CHLORIDE 40 MEQ: 1500 TABLET, EXTENDED RELEASE ORAL at 12:00

## 2023-07-08 RX ADMIN — ACETAMINOPHEN 650 MG: 325 TABLET, FILM COATED ORAL at 13:02

## 2023-07-08 RX ADMIN — APIXABAN 5 MG: 5 TABLET, FILM COATED ORAL at 17:37

## 2023-07-08 RX ADMIN — METOPROLOL SUCCINATE 25 MG: 25 TABLET, FILM COATED, EXTENDED RELEASE ORAL at 21:07

## 2023-07-08 RX ADMIN — GABAPENTIN 400 MG: 400 CAPSULE ORAL at 17:37

## 2023-07-08 RX ADMIN — APIXABAN 5 MG: 5 TABLET, FILM COATED ORAL at 11:52

## 2023-07-08 NOTE — PROGRESS NOTES
Internal Medicine Progress Note  Patient: Annika Baez  Age/sex: 70 y.o. female  Medical Record #: [id number]      ASSESSMENT/PLAN: (Interval History)  Annika Baez is seen and examined and management for following issues:    Left lateral thigh hematoma   • Stable  • Will watch hemoglobin     ABLA  • S/p 1 unit PRBCs 7/2/23     Thrush  • Continue Nystatin     Chronic systolic heart failure   • Originally diagnosed 2004 when LVEF then was 10-15% and felt to be NICM  • Her LVEF did recover into 50's then fell when she had her CVA in 2/2023 to 30%  • Also has moderate AI/gr 2 DD  • Had been seeing Cardiology at [facility] but now sees Dr Theresa Walker  • She declined a LifeVest in 2/2023 when LVEF was found to be 30%  • Had been on Coreg in past but HF team switched her to Toprol when she had her stroke 2/2023  • Her soft BPs have limited starting her on ACEI/ARB as OP  • Home:  Toprol XL 25mg BID/Aldactone 25mg qd  • Here:  Toprol XL 25 mg BID (inc from qd 7/6/23)/Aldactone 25mg qd  • Last night, Toprol and Minipress were held; this AM Toprol and Aldactone held 2/2 soft BPs  • Will reduce hold parameter on Toprol to <100 and increase hold on Minipress to 120     Hypercalcemia  • Continue Cinacalcet here as was taking at home     HS hypoxia   • using 4L NC at sleep at home     Nightmares  • placed on Minipress when here with us after her CVA     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  • Has residual left sided weakness  • Had recrudescence of CVA 4/2023 with worsening left facial droop  • Continue Eliquis     Dysphagia  • Had admission for aspiration PNA at [facility] 5/2023 tx'd with Levaquin and Flagyl  • Continue pureed diet with NTL as at home  • Continue Ensure HP pudding TID  • ST to follow     Bipolar depression  • Home:  Wellbutrin 100mg BID/Cymbalta 60mg BID/Lamictal 150mg qhs  • Here:  Wellbutrin 100mg BID/Cymbalta 60mg BID/Lamictal 150mg qhs     Rheumatoid arthritis  • Continue Plaquenil 200mg BID as at home     Chronic pain/chronic opioid use  • 2/2 RA and lumbar spondylosis  • Pain management per PMR     Hx incidental right superior hypophyseal artery aneurysm  • Follows with NS Dr Merissa Tesfaye     CRE urine 3/2023  • Maintain contact isolation     Dye allergy   • Was dye prepped on 7/4/23 with Benadryl 50mg IV x 1 w/o issues     Incidental findings  • Pancolitis with increasing inflammatory free fluid in pelvis  • Calcified granuloma RLL     Hypokalemia  · For Kdur 40 meq x 2 today 7/8/23       Discharge date:  Team    The above assessment and plan was reviewed and updated as determined by my evaluation of the patient on 7/8/2023.     Labs:   Results from last 7 days   Lab Units 07/08/23  0537 07/06/23  0534   WBC Thousand/uL 6.56 5.31   HEMOGLOBIN g/dL 9.2* 8.5*   HEMATOCRIT % 29.2* 26.4*   PLATELETS Thousands/uL 279 229     Results from last 7 days   Lab Units 07/08/23  0537 07/05/23  0552   SODIUM mmol/L 144 144   POTASSIUM mmol/L 3.2* 3.6   CHLORIDE mmol/L 114* 114*   CO2 mmol/L 25 25   BUN mg/dL 15 7   CREATININE mg/dL 0.88 0.72   CALCIUM mg/dL 8.3 7.5*             Results from last 7 days   Lab Units 07/02/23  0745   POC GLUCOSE mg/dl 86       Review of Scheduled Meds:  Current Facility-Administered Medications   Medication Dose Route Frequency Provider Last Rate   • acetaminophen  650 mg Oral Q6H Steve Chavez MD     • albuterol  2 puff Inhalation Q6H PRN Lisa Geronimo MD     • apixaban  5 mg Oral BID Lisa Geronimo MD     • buPROPion  100 mg Oral BID Lisa Geronimo MD     • cinacalcet  30 mg Oral Daily With Breakfast Lisa Geronimo MD     • DULoxetine  60 mg Oral BID Lisa Geronimo MD     • gabapentin  400 mg Oral BID Lisa Geronimo MD     • hydroxychloroquine  200 mg Oral BID With Meals Lisa Geronimo MD     • lamoTRIgine  150 mg Oral HS Lisa Geronimo MD     • magnesium Oxide  400 mg Oral BID Lisa Geronimo MD     • melatonin  3 mg Oral HS Lisa Geronimo MD     • metoprolol succinate  25 mg Oral BID Jeannette Golden MD     • nystatin  500,000 Units Swish & Spit 4x Daily Jeannette Golden MD     • ondansetron  4 mg Oral Q6H PRN Jeannette Golden MD     • oxyCODONE  5 mg Oral Q4H PRN Jeannette Golden MD     • oxyCODONE  2.5 mg Oral Q4H PRN Jeannette Golden MD     • pantoprazole  40 mg Oral Early Morning Jeannette Golden MD     • potassium chloride  40 mEq Oral BID Jeannette Golden MD     • prazosin  1 mg Oral HS Jeannette Golden MD     • spironolactone  25 mg Oral Daily Jeannette Golden MD         Subjective/ HPI: Patient seen and examined. Patients overnight issues or events were reviewed with nursing or staff during rounds or morning huddle session. New or overnight issues include the following:     No new or overnight issues. Offers no complaints    ROS:   A 10 point ROS was performed; negative except as noted above.        Imaging:     No orders to display       *Labs /Radiology studies reviewed  *Medications reviewed and reconciled as needed  *Please refer to order section for additional ordered labs studies  *Case discussed with primary attending during morning huddle case rounds    Physical Examination:  Vitals:   Vitals:    07/08/23 0531 07/08/23 0600 07/08/23 1135 07/08/23 1150   BP: 120/70  104/61 92/60   BP Location: Left arm  Left arm Left arm   Pulse:   84    Resp:       Temp:       TempSrc:       SpO2:       Weight:  53.5 kg (117 lb 15.1 oz)     Height:           General Appearance: no distress, conversive  HEENT: PERRLA, conjuctiva normal; oropharynx clear; mucous membranes moist   Neck:  Supple, normal ROM  Lungs: CTA, normal respiratory effort, no retractions, expiratory effort normal  CV: regular rate and rhythm; no rubs/murmurs/gallops, PMI normal   ABD: soft; ND/NT; +BS  EXT: no edema  Skin: normal turgor, normal texture, no rashes  Psych: affect normal, mood normal  Neuro: AAO      The above physical exam was reviewed and updated as determined by my evaluation of the patient on 7/8/2023. Invasive Devices     Peripheral Intravenous Line  Duration           Long-Dwell Peripheral IV (Midline) 75/71/58 Right Basilic 4 days                   VTE Pharmacologic Prophylaxis: Eliquis  Code Status: Level 1 - Full Code  Current Length of Stay: 1 day(s)      Total time spent:  30 minutes with more than 50% spent counseling/coordinating care. Counseling includes discussion with patient re: progress  and discussion with patient of his/her current medical state/information. Coordination of patient's care was performed in conjunction with primary service. Time invested included review of patient's labs, vitals, and management of their comorbidities with continued monitoring. In addition, this patient was discussed with medical team including physician and advanced extenders. The care of the patient was extensively discussed and appropriate treatment plan was formulated unique for this patient. Medical decision making for the day was made by supervising physician unless otherwise noted in their attestation statement. ** Please Note:  voice to text software may have been used in the creation of this document.  Although proof errors in transcription or interpretation are a potential of such software**

## 2023-07-08 NOTE — PLAN OF CARE
Problem: PAIN - ADULT  Goal: Verbalizes/displays adequate comfort level or baseline comfort level  Description: Interventions:  - Encourage patient to monitor pain and request assistance  - Assess pain using appropriate pain scale  - Administer analgesics based on type and severity of pain and evaluate response  - Implement non-pharmacological measures as appropriate and evaluate response  - Consider cultural and social influences on pain and pain management  - Notify physician/advanced practitioner if interventions unsuccessful or patient reports new pain  Outcome: Progressing     Problem: INFECTION - ADULT  Goal: Absence or prevention of progression during hospitalization  Description: INTERVENTIONS:  - Assess and monitor for signs and symptoms of infection  - Monitor lab/diagnostic results  - Monitor all insertion sites, i.e. indwelling lines, tubes, and drains  - Monitor endotracheal if appropriate and nasal secretions for changes in amount and color  - [place] appropriate cooling/warming therapies per order  - Administer medications as ordered  - Instruct and encourage patient and family to use good hand hygiene technique  - Identify and instruct in appropriate isolation precautions for identified infection/condition  Outcome: Progressing  Goal: Absence of fever/infection during neutropenic period  Description: INTERVENTIONS:  - Monitor WBC    Outcome: Progressing     Problem: SAFETY ADULT  Goal: Patient will remain free of falls  Description: INTERVENTIONS:  - Educate patient/family on patient safety including physical limitations  - Instruct patient to call for assistance with activity   - Consult OT/PT to assist with strengthening/mobility   - Keep Call bell within reach  - Keep bed low and locked with side rails adjusted as appropriate  - Keep care items and personal belongings within reach  - Initiate and maintain comfort rounds  - Make Fall Risk Sign visible to staff  - Offer Toileting every 2 Hours, in advance of need  - Initiate/Maintain bed alarm  - Obtain necessary fall risk management equipment: bed alarm  - Apply yellow socks and bracelet for high fall risk patients  - Consider moving patient to room near nurses station  Outcome: Progressing  Goal: Maintain or return to baseline ADL function  Description: INTERVENTIONS:  -  Assess patient's ability to carry out ADLs; assess patient's baseline for ADL function and identify physical deficits which impact ability to perform ADLs (bathing, care of mouth/teeth, toileting, grooming, dressing, etc.)  - Assess/evaluate cause of self-care deficits   - Assess range of motion  - Assess patient's mobility; develop plan if impaired  - Assess patient's need for assistive devices and provide as appropriate  - Encourage maximum independence but intervene and supervise when necessary  - Involve family in performance of ADLs  - Assess for home care needs following discharge   - Consider OT consult to assist with ADL evaluation and planning for discharge  - Provide patient education as appropriate  Outcome: Progressing  Goal: Maintains/Returns to pre admission functional level  Description: INTERVENTIONS:  - Perform BMAT or MOVE assessment daily.   - Set and communicate daily mobility goal to care team and patient/family/caregiver. - Collaborate with rehabilitation services on mobility goals if consulted  - Perform Range of Motion 3 times a day. - Reposition patient every 2 hours.   - Dangle patient 3 times a day  - Stand patient 2 times a day  - Ambulate patient 3 times a day  - Out of bed to chair 3 times a day   - Out of bed for meals 3 times a day  - Out of bed for toileting  - Record patient progress and toleration of activity level   Outcome: Progressing     Problem: DISCHARGE PLANNING  Goal: Discharge to home or other facility with appropriate resources  Description: INTERVENTIONS:  - Identify barriers to discharge w/patient and caregiver  - Arrange for needed discharge resources and transportation as appropriate  - Identify discharge learning needs (meds, wound care, etc.)  - Arrange for interpretive services to assist at discharge as needed  - Refer to Case Management Department for coordinating discharge planning if the patient needs post-hospital services based on physician/advanced practitioner order or complex needs related to functional status, cognitive ability, or social support system  Outcome: Progressing     Problem: Prexisting or High Potential for Compromised Skin Integrity  Goal: Skin integrity is maintained or improved  Description: INTERVENTIONS:  - Identify patients at risk for skin breakdown  - Assess and monitor skin integrity  - Assess and monitor nutrition and hydration status  - Monitor labs   - Assess for incontinence   - Turn and reposition patient  - Assist with mobility/ambulation  - Relieve pressure over bony prominences  - Avoid friction and shearing  - Provide appropriate hygiene as needed including keeping skin clean and dry  - Evaluate need for skin moisturizer/barrier cream  - Collaborate with interdisciplinary team   - Patient/family teaching  - Consider wound care consult   Outcome: Progressing     Problem: Nutrition/Hydration-ADULT  Goal: Nutrient/Hydration intake appropriate for improving, restoring or maintaining nutritional needs  Description: Monitor and assess patient's nutrition/hydration status for malnutrition. Collaborate with interdisciplinary team and initiate plan and interventions as ordered. Monitor patient's weight and dietary intake as ordered or per policy. Utilize nutrition screening tool and intervene as necessary. Determine patient's food preferences and provide high-protein, high-caloric foods as appropriate.      INTERVENTIONS:  - Monitor oral intake, urinary output, labs, and treatment plans  - Assess nutrition and hydration status and recommend course of action  - Evaluate amount of meals eaten  - Assist patient with eating if necessary   - Allow adequate time for meals  - Recommend/ encourage appropriate diets, oral nutritional supplements, and vitamin/mineral supplements  - Order, calculate, and assess calorie counts as needed  - Recommend, monitor, and adjust tube feedings and TPN/PPN based on assessed needs  - Assess need for intravenous fluids  - Provide specific nutrition/hydration education as appropriate  - Include patient/family/caregiver in decisions related to nutrition  Outcome: Progressing

## 2023-07-08 NOTE — CONSULTS
RD consult re: "assessment" addressed and documented in computer  Summary:  Appears to be eating somewhat better compared to IP admit; rx for pureed diet NTL and ensure pudding supplement; records suggest significant weight loss; suspect malnutrition, will review further

## 2023-07-08 NOTE — PROGRESS NOTES
PT ARC GOALS   07/08/23 1100   Rehab Team Goals   Transfer Team Goal Patient will require supervision with transfers with least restrictive device upon completion of rehab program   Locomotion Team Goal Patient will require supervision with locomotion with least restrictive device upon completion of rehab program   Rehab Team Interventions   PT Interventions Gait Training; Therapeutic Exercise;Neuromuscualr Reeducation;Transfer Training;Bed Mobility; Wheelchair Mobility; Patient/Family Education   PT Transfer Goal   Roll left and right Goal 06. Independent - Patient completes the activity by him/herself with no assistance from a helper. Sit to lying Goal 06. Independent - Patient completes the activity by him/herself with no assistance from a helper. Lying to sitting on side of bed Goal 06. Independent - Patient completes the activity by him/herself with no assistance from a helper. Sit to stand Goal 04. Supervision or touching assistance- [place] provides VERBAL CUES or supervision throughout activity. Chair/bed-to-chair transfer Goal 04. Supervision or touching assistance- [place] provides VERBAL CUES or supervision throughout activity. Car Transfer Goal 04. Supervision or touching assistance- [place] provides VERBAL CUES or supervision throughout activity. Assistive Device Roller Walker   Status Target goal - one week; Target goal - two weeks   Locomotion Goal   Primary discharge mode of locomotion Both   Target Walk Distance 150 ft  (increase distance as able to work on endurance and improving activity tolerance)   Assist Device Roller Walker   Gait Pattern Improvement Inconsistant Amy;[place] AYLA; Forward Flexion; Improper weight shift   Environment Level Surface; Uneven Surface; Well Lit   Walk 10 feet Goal 04. Supervision or touching assistance- [place] provides VERBAL CUES or supervision throughout activity. Walk 50 feet with 2 turns Goal 04.  Supervision or touching assistance- [place] provides VERBAL CUES or supervision throughout activity. Walk 150 feet Goal 04. TOUCHING/ STEADYING assistance as patient completes activity. Walking 10 feet on uneven surface 04. TOUCHING/ STEADYING assistance as patient completes activity. Walking Goal Status Target goal - one week; Target goal - two weeks   Type of Wheelchair Used 1. Manual   Wheel 50 feet with 2 turns Goal 04. Supervision or touching assistance- [place] provides VERBAL CUES or supervision throughout activity. Wheel 150 feet Goal 04. Supervision or touching assistance- [place] provides VERBAL CUES or supervision throughout activity. Decrease Assist With Obstacles   Environment Level Surface; Well Lit   Wheelchair Goal Status Target goal - one week; Target goal - two weeks   Stairs Goal   1 step or curb goal 04. TOUCHING/ STEADYING assistance as patient completes activity. 4 steps Goal 04. TOUCHING/ STEADYING assistance as patient completes activity. 12 steps Goal 04. TOUCHING/ STEADYING assistance as patient completes activity. Assist Level Contact Guard   Number of Stairs 12   Technique Non-reciprocal   Hand Rail Bilateral   Status Target goal - one week; Target goal - two weeks   Object Retrieval Goal   Picking up object Goal 04. Supervision or touching assistance- [place] provides VERBAL CUES or supervision throughout activity.    Assistive Device  Reacher   Small Object Picked Up with RW

## 2023-07-08 NOTE — PROGRESS NOTES
SLP ARC TAA       07/08/23 1130   Patient Data   Rehab Impairment Impairment of mobility, safety and Activities of Daily Living (ADLs) due to Other Disabling Impairments:  13  Other Disabling Impairments   Etiologic Diagnosis Hematoma of leg, left, initial encounter (N31.80QW)   Date of Onset 06/30/23   Support System   Name Tabitha Nolasco  (lives with son, eLe Grimes)   Relationship Daughter   Baseline Information   Transportation Family/friends drive   Restrictions/Precautions   Precautions Aspiration;Bed/chair alarms;Cognitive; Fall Risk;Pain;Supervision on toilet/commode;Visual deficit   Pain Assessment   Pain Assessment Tool 0-10   Pain Score No Pain   Eating Assessment   Swallow Precautions Yes   Bedside Swallow Results Yes   VBS Study Results Yes  (previously completed 3/9/2023)   Food To Mouth Yes   Positioning Upright;Out of Bed   Safety Needs Increase Time;Cues;Freq Swallow   Meal Assessed Lunch   QI: Swallowing/Nutritional Status Mechanical Diet  (puree and NTL)   Current Diet Dysphia I;Nectar Thick   Intake Mode PO   Finishes Timely No   Opens Packages No   Findings Pt completed bedside swallow assessment which she presents with mild to moderate oral and pharyngeal stages of swallow. See SLP rehab note for details. Type of Assistance Needed Set-up / clean-up;Supervision;Verbal cues   Physical Assistance Level No physical assistance   Eating CARE Score 4   Discharge Information   Vocational Plan Retired/not working   Patient's Discharge Plan home with family support   Patient's Rehab Expectations to get stronger   Barriers to Discharge Home Limited Family Support;Decreased Cognitive Function;Decreased Strength;Decreased Endurance; Safety Considerations   Impressions Pt is a 71 yo female with PMHx HTN, CVA in 2/2023 on eliquis (chronic dysphagia, L sided weakness, cognitive impairment), hypercalcemia,  CHF, GERD, SHAR, gout, bipolar disorder, RA on plaquenil, chronic opioid dependence on morphine for chronic back/joint pain, who [place] [facility] after a fall, unable to get up and disoriented. Found to have a L lateral thigh hematoma with evidence of extravasation on imaging. Pt known to this department from previous stay in 03/2023. Pt was discharged to [address] [place] [address] and per chart, eventually made it home with family support/assistance. Pt cont'd dysphagia tx with home health but was hospitalized 05/2023 for aspiration pneumonia. Pt conts on puree diet and NTL at this time with possible recommendations for repeat VFSS as last one was completed on this unit back in March. Pt was approved for acute rehab and transferred on 7/7/2023. Orders received for skilled bedside swallow, speech and cognitive assessment which was initiated with bedside swallow only this session. Time spend to review history with pt and her daughter Rochelle Barrera who was present for evaluation. Plan to cont to target swallow as well as further cognition/motor speech as warranted. ELOS ~ 2 weeks.    SLP Therapy Minutes   SLP Time In 1130   SLP Time Out 1300   SLP Total Time (minutes) 90   SLP Mode of treatment - Individual (minutes) 90   SLP Mode of treatment - Concurrent (minutes) 0   SLP Mode of treatment - Group (minutes) 0   SLP Mode of treatment - Co-treat (minutes) 0   SLP Mode of Treatment - Total time(minutes) 90 minutes   SLP Cumulative Minutes 90   Cumulative Minutes   Cumulative therapy minutes 240

## 2023-07-08 NOTE — PROGRESS NOTES
07/08/23 1000   Patient Data   Rehab Impairment Impairment of mobility, safety and Activities of Daily Living (ADLs) due to Other Disabling Impairments:  13  Other Disabling Impairments   Etiologic Diagnosis Hematoma of leg, left, initial encounter (W45.94DU)   Date of Onset 06/30/24   Support System   Name [address]   Relationship Daughter  (Called during eval to confirm home set up and the level of assistance she has at home. LVM requesting call back.)   Phone Number [phone]   Able to provide 24 hour supervision   (Ronald Arreguin will need to follow up)   Home Setup   Type of Home Multi Level   Method of Entry Stairs   Number of Stairs 1  (no railing)   Number of Stairs in Home 12   In Home Hand Rail Bilateral   First Floor Bathroom   (Yes)   Second Floor Bathroom   (Yes)   First Floor Setup Available Yes  (Patient reports currently sleeping on a pull out couch on the first floor of the home. Intermittently uses the stairs if she needs to go upstairs. Will need to confirm with family)   Prior Level of Function   Self-Care 2. Needed Some Help - Patient needed a partial assistance from another person to complete activities. Indoor-Mobility (Ambulation) 3. Independent - Patient completed the activities by him/herself, with or without an assistive device, with no assistance from a helper. (Pt report independence but unsure how safe)   Stairs 2. Needed Some Help - Patient needed a partial assistance from another person to complete activities. Functional Cognition 2. Needed Some Help - Patient needed a partial assistance from another person to complete activities. Prior Assistance Needed for Driving;Household Chores/Cleaning;Meal Preparation;Medication Management;Money Management; Shopping   Prior Device Used D.  [facility] in the Last Year   Number of falls in the past 12 months   (3-4)   Type of Injury Associated with Fall Injury   Patient Preference   Nickname (Patient Preference) Marley Virk Restrictions/Precautions   Precautions Aspiration;Bed/chair alarms; Fall Risk;Cognitive;Pain;Supervision on toilet/commode;Visual deficit  (Left visual field deficit)   Pain Assessment   Pain Assessment Tool 0-10   Pain Score 7   Pain Location/Orientation Orientation: Left; Location: Hip;Location: Leg   Hospital Pain Intervention(s) Repositioned; Rest   Transfer Bed/Chair/Wheelchair   Positioning Concerns Cognition   Limitations Noted In Balance; Coordination;Pain Management;Problem Solving; Sequencing;UE Strength;LE Strength;Vision   Adaptive Equipment Roller Walker   Type of Assistance Needed Physical assistance   Physical Assistance Level 25% or less   Comment SPT from EOB>WC, Fei with RW, Mod VCs for sequencing, hand placement, and management of RW   Chair/Bed-to-Chair Transfer CARE Score 3   Roll Left and Right   Type of Assistance Needed Physical assistance   Physical Assistance Level 25% or less   Comment Fei without bed rails   Roll Left and Right CARE Score 3   Sit to Lying   Type of Assistance Needed Physical assistance   Physical Assistance Level 25% or less   Comment Fei for management of LEs   Sit to Lying CARE Score 3   Lying to Sitting on Side of Bed   Type of Assistance Needed Incidental touching   Comment CGA without use of bed rails   Lying to Sitting on Side of Bed CARE Score 4   Sit to Stand   Type of Assistance Needed Physical assistance   Physical Assistance Level 25% or less   Comment Fei with RW, requires steadying upon standing, verbal cuse for proper hand placement.  Hand over hand assistance to ensure L hand on walker   Sit to Stand CARE Score 3   Picking Up Object   Type of Assistance Needed Incidental touching   Comment with RW for support   Picking Up Object CARE Score 4   Car Transfer   Type of Assistance Needed Physical assistance   Physical Assistance Level 25% or less   Comment Fei for management of LEs   Car Transfer CARE Score 3   Ambulation   Primary Mode of Locomotion Prior to Admission Walk   Distance Walked (feet) 50 ft   Assist Device Roller Walker   Gait Pattern Slow Amy; Inconsistant Amy;Narrow AYLA;Step to; Step through; Improper weight shift; Forward Flexion   Limitations Noted In Balance; Endurance;Device Management; Heel Strike;Posture; Safety;Speed;Strength;Swing   Provided Assistance with: Balance;Weight Shift   Walk Assist Level Minimum Assist   Findings Fei with RW, support at hips for balance/stability, VC to stay within RW frame. Mod VC for sequencing when turning with RW. Walk 10 Feet   Type of Assistance Needed Physical assistance   Physical Assistance Level 25% or less   Comment Fei with RW   Walk 10 Feet CARE Score 3   Walk 50 Feet with Two Turns   Type of Assistance Needed Physical assistance   Physical Assistance Level 25% or less   Comment Fei with RW   Walk 50 Feet with Two Turns CARE Score 3   Walk 150 Feet   Comment patient fatigued quickly this session   Reason if not Attempted Safety concerns   Walk 150 Feet CARE Score 88   Walking 10 Feet on Uneven Surfaces   Type of Assistance Needed Physical assistance   Physical Assistance Level 26%-50%   Comment Min-ModA with RW, Max VCs for safety   Walking 10 Feet on Uneven Surfaces CARE Score 3   Wheelchair mobility   Type of Wheelchair Used 1. Manual   Method Right upper extremity;Right lower extremity; Left lower extremity   Assistance Provided For Obstacles; Locking Brakes   Wheel 50 Feet with Two Turns   Type of Assistance Needed Physical assistance   Physical Assistance Level 26%-50%   Comment Min/ModA for navigation turns and obstacles to L side, easily distracted   Wheel 50 Feet with Two Turns CARE Score 3   Wheel 150 Feet   Comment Fatigued and unable to propel self further   Reason if not Attempted Safety concerns   Wheel 150 Feet CARE Score 88   Curb or Single Stair   Style negotiated Curb   Type of Assistance Needed Physical assistance   Physical Assistance Level 51%-75%   Comment ModA for balance and management of RW. VC to place RW fully onto step   1 Step (Curb) CARE Score 2   4 Steps   Comment Only able to complete 2 steps before requiring a seated rest break   Reason if not Attempted Safety concerns   4 Steps CARE Score 88   12 Steps   Reason if not Attempted Safety concerns   12 Steps CARE Score 88   Stairs   Type  Steps   # of Steps 2   Weight Bearing Precautions Fall Risk   Assist Devices Bilateral Rail   Findings 2 steps x2 trials, required seated rest break between secondary to fatigue. Ascend leading with RLE, descend leading with LLE, VC and assistance for LUE hand placement. Comprehension   QI: Comprehension 3. Usually Understands: Understands most conversations, but misses some part/intent of message. Requires cues at times to understand. Expression   QI: Expression 3. Exhibits some difficulty with expressing needs and ideas (e.g., some words or finishing thoughts) or speech is not clear   Cognition   Overall Cognitive Status Impaired   Arousal/Participation Alert;Arousable   Attention Attends with cues to redirect   Memory Decreased short term memory;Decreased recall of recent events   Following Commands Follows one step commands with increased time or repetition   Objective Measure   PT Measure(s) TUG with RW: 41 seconds. 5x Sit to Stand with bilateral UE Support: 17 seconds   Discharge Information   Vocational Plan Retired/not working   Patient's Discharge Plan To return home   Patient's Rehab Expectations To improve independence with mobility   Barriers to Discharge Home Limited Family Support; Unsafe Home Setup; Decreased Cognitive Function;Decreased Strength;Decreased Endurance;Pain; Safety Considerations   Impressions Patient is a 70year old female who presents to [address] [place] [address] following hospitalization for fall off the porch at home. Patient was unable to get up on her own and was disoriented. Fall resulted in Left leg hematoma.  PMH significant for HTN, CVA in 2/2023 on eliquis (chronic dysphagia, L sided weakness, cognitive impairment), hypercalcemia,  CHF, GERD, SHAR, gout, bipolar disorder, RA on plaquenil, chronic opioid dependence on morphine for chronic back/joint pain,. Prior to admission, patient was independent for ADLs, non , ambulating with assistive device. Patient lives with with other family member:son and DIL in a [address] with 1 TAB. First floor set up available. Call to daughter during evaluation to confirm home set up and assistance level available at home. On initial evaluation, patient presents decreased strength, decreased ROM, imbalance, decreased endurance, fatigue, pain, cognitive deficits, decreased safety awareness, decreased insight to deficits, impulsiveness, delayed righting reactions and sequencing deficits resulting in increased assistance for all functional mobility. Patient requires [address] for bed mobility, [address] for transfers, [address] for ambulation, [employer]  for stair navigation. Patient is high risk for falls secondary to deficits found on initial evaluation and via outcome measures. Patient will benefit from physical therapy services to address the deficits identified on evaluation and to maximize safety and independence with all functional mobility and to decrease caregiver burden. Barriers to discharge home at this time include: limited caregiver support/ inaccessible home environment/high risk for falls/impaired cognition. Patient is a fair rehabilitation candidate. Patient's estimated length of stay is 10 to 14 days with goals set for Supervision / Standby Assist. PT plan of care to focus on bed mobility/transfers/gait training/stair navigation/strength training/balance training/improving activity tolerance.    PT Therapy Minutes   PT Time In 1000   PT Time Out 1100   PT Total Time (minutes) 60   PT Mode of treatment - Individual (minutes) 60   PT Mode of treatment - Concurrent (minutes) 0   PT Mode of treatment - Group (minutes) 0   PT Mode of treatment - Co-treat (minutes) 0   PT Mode of Treatment - Total time(minutes) 60 minutes   PT Cumulative Minutes 60   Cumulative Minutes   Cumulative therapy minutes [address], PT, DPT

## 2023-07-08 NOTE — PROGRESS NOTES
[facility] PT EVAL     07/08/23 1000   Patient Data   Rehab Impairment Impairment of mobility, safety and Activities of Daily Living (ADLs) due to Other Disabling Impairments:  13  Other Disabling Impairments   Etiologic Diagnosis Hematoma of leg, left, initial encounter (X43.03CQ)   Date of Onset 06/30/24   Support System   Name [address]   Relationship Daughter  (Called during eval to confirm home set up and the level of assistance she has at home. LVM requesting call back.)   Phone Number [phone]   Able to provide 24 hour supervision   (Melina Deal will need to follow up)   Home Setup   Type of Home Multi Level   Method of Entry Stairs   Number of Stairs 1  (no railing)   Number of Stairs in Home 12   In Home Hand Rail Bilateral   First Floor Bathroom   (Yes)   Second Floor Bathroom   (Yes)   First Floor Setup Available Yes  (Patient reports currently sleeping on a pull out couch on the first floor of the home. Intermittently uses the stairs if she needs to go upstairs. Will need to confirm with family)   Prior Level of Function   Prior Device Used [facility] in the Last Year   Number of falls in the past 12 months   (3-4)   Type of Injury Associated with Fall Injury   Patient Preference   Nickname (Patient Preference) Joselito Espinoza   Restrictions/Precautions   Precautions Aspiration;Bed/chair alarms; Fall Risk;Cognitive;Pain;Supervision on toilet/commode;Visual deficit  (Left visual field deficit)   Pain Assessment   Pain Assessment Tool 0-10   Pain Score 7   Pain Location/Orientation Orientation: Left; Location: Hip;Location: Leg   Hospital Pain Intervention(s) Repositioned; Rest   Transfer Bed/Chair/Wheelchair   Positioning Concerns Cognition   Limitations Noted In Balance; Coordination;Pain Management;Problem Solving; Sequencing;UE Strength;LE Strength;Vision   Adaptive Equipment Roller Walker   Type of Assistance Needed Physical assistance   Physical Assistance Level 25% or less   Comment SPT from EOB>WC, Fei with RW, Mod VCs for sequencing, hand placement, and management of RW   Chair/Bed-to-Chair Transfer CARE Score 3   Roll Left and Right   Type of Assistance Needed Physical assistance   Physical Assistance Level 25% or less   Comment Fei without bed rails   Roll Left and Right CARE Score 3   Sit to Lying   Type of Assistance Needed Physical assistance   Physical Assistance Level 25% or less   Comment Fei for management of LEs   Sit to Lying CARE Score 3   Lying to Sitting on Side of Bed   Type of Assistance Needed Incidental touching   Comment CGA without use of bed rails   Lying to Sitting on Side of Bed CARE Score 4   Sit to Stand   Type of Assistance Needed Physical assistance   Physical Assistance Level 25% or less   Comment Fei with RW, requires steadying upon standing, verbal cuse for proper hand placement. Hand over hand assistance to ensure L hand on walker   Sit to Stand CARE Score 3   Picking Up Object   Type of Assistance Needed Incidental touching   Comment with RW for support   Picking Up Object CARE Score 4   Car Transfer   Type of Assistance Needed Physical assistance   Physical Assistance Level 25% or less   Comment Fei for management of LEs   Car Transfer CARE Score 3   Ambulation   Primary Mode of Locomotion Prior to Admission Walk   Distance Walked (feet) 50 ft   Assist Device Roller Walker   Gait Pattern Slow Amy; Inconsistant Amy;Narrow AYLA;Step to; Step through; Improper weight shift; Forward Flexion   Limitations Noted In Balance; Endurance;Device Management; Heel Strike;Posture; Safety;Speed;Strength;Swing   Provided Assistance with: Balance;Weight Shift   Walk Assist Level Minimum Assist   Findings Fei with RW, support at hips for balance/stability, VC to stay within RW frame. Mod VC for sequencing when turning with RW.    Walk 10 Feet   Type of Assistance Needed Physical assistance   Physical Assistance Level 25% or less   Comment Fei with RW   Walk 10 Feet CARE Score 3   Walk 50 Feet with Two Turns   Type of Assistance Needed Physical assistance   Physical Assistance Level 25% or less   Comment Fei with RW   Walk 50 Feet with Two Turns CARE Score 3   Walk 150 Feet   Comment patient fatigued quickly this session   Reason if not Attempted Safety concerns   Walk 150 Feet CARE Score 88   Walking 10 Feet on Uneven Surfaces   Type of Assistance Needed Physical assistance   Physical Assistance Level 26%-50%   Comment Min-ModA with RW, Max VCs for safety   Walking 10 Feet on Uneven Surfaces CARE Score 3   Wheelchair mobility   Type of Wheelchair Used 1. Manual   Method Right upper extremity;Right lower extremity; Left lower extremity   Assistance Provided For Obstacles; Locking Brakes   Wheel 50 Feet with Two Turns   Type of Assistance Needed Physical assistance   Physical Assistance Level 26%-50%   Comment Min/ModA for navigation turns and obstacles to L side, easily distracted   Wheel 50 Feet with Two Turns CARE Score 3   Wheel 150 Feet   Comment Fatigued and unable to propel self further   Reason if not Attempted Safety concerns   Wheel 150 Feet CARE Score 88   Curb or Single Stair   Style negotiated Curb   Type of Assistance Needed Physical assistance   Physical Assistance Level 51%-75%   Comment ModA for balance and management of RW. VC to place RW fully onto step   1 Step (Curb) CARE Score 2   4 Steps   Comment Only able to complete 2 steps before requiring a seated rest break   Reason if not Attempted Safety concerns   4 Steps CARE Score 88   12 Steps   Reason if not Attempted Safety concerns   12 Steps CARE Score 88   Stairs   Type  Steps   # of Steps 2   Weight Bearing Precautions Fall Risk   Assist Devices Bilateral Rail   Findings 2 steps x2 trials, required seated rest break between secondary to fatigue. Ascend leading with RLE, descend leading with LLE, VC and assistance for LUE hand placement. Comprehension   QI: Comprehension 3.  Usually Understands: Understands most conversations, but misses some part/intent of message. Requires cues at times to understand. Expression   QI: Expression 3. Exhibits some difficulty with expressing needs and ideas (e.g., some words or finishing thoughts) or speech is not clear   Cognition   Overall Cognitive Status Impaired   Arousal/Participation Alert;Arousable   Attention Attends with cues to redirect   Memory Decreased short term memory;Decreased recall of recent events   Following Commands Follows one step commands with increased time or repetition   Objective Measure   PT Measure(s) TUG with RW: 41 seconds. 5x Sit to Stand with bilateral UE Support: 17 seconds   Discharge Information   Vocational Plan Retired/not working   Patient's Discharge Plan To return home   Patient's Rehab Expectations To improve independence with mobility   Barriers to Discharge Home Limited Family Support; Unsafe Home Setup; Decreased Cognitive Function;Decreased Strength;Decreased Endurance;Pain; Safety Considerations   Impressions Patient is a 70year old female who presents to [address] [place] [address] following hospitalization for fall off the porch at home. Patient was unable to get up on her own and was disoriented. Fall resulted in Left leg hematoma. PMH significant for HTN, CVA in 2/2023 on eliquis (chronic dysphagia, L sided weakness, cognitive impairment), hypercalcemia,  CHF, GERD, SHAR, gout, bipolar disorder, RA on plaquenil, chronic opioid dependence on morphine for chronic back/joint pain,. Prior to admission, patient was independent for ADLs, non , ambulating with assistive device. Patient lives with with other family member:son and DIL in a [facility] with 1 TAB. First floor set up available. Call to daughter during evaluation to confirm home set up and assistance level available at home.   On initial evaluation, patient presents decreased strength, decreased ROM, imbalance, decreased endurance, fatigue, pain, cognitive deficits, decreased safety awareness, decreased insight to deficits, impulsiveness, delayed righting reactions and sequencing deficits resulting in increased assistance for all functional mobility. Patient requires [address] for bed mobility, [address] for transfers, [address] for ambulation, [employer]  for stair navigation. Patient is high risk for falls secondary to deficits found on initial evaluation and via outcome measures. Patient will benefit from physical therapy services to address the deficits identified on evaluation and to maximize safety and independence with all functional mobility and to decrease caregiver burden. Barriers to discharge home at this time include: limited caregiver support/ inaccessible home environment/high risk for falls/impaired cognition. Patient is a fair rehabilitation candidate. Patient's estimated length of stay is 10 to 14 days with goals set for Supervision / Standby Assist. PT plan of care to focus on bed mobility/transfers/gait training/stair navigation/strength training/balance training/improving activity tolerance.    PT Therapy Minutes   PT Time In 1000   PT Time Out 1100   PT Total Time (minutes) 60   PT Mode of treatment - Individual (minutes) 60   PT Mode of treatment - Concurrent (minutes) 0   PT Mode of treatment - Group (minutes) 0   PT Mode of treatment - Co-treat (minutes) 0   PT Mode of Treatment - Total time(minutes) 60 minutes   PT Cumulative Minutes 60   Cumulative Minutes   Cumulative therapy minutes [address], PT, DPT

## 2023-07-08 NOTE — PLAN OF CARE
Problem: PAIN - ADULT  Goal: Verbalizes/displays adequate comfort level or baseline comfort level  Description: Interventions:  - Encourage patient to monitor pain and request assistance  - Assess pain using appropriate pain scale  - Administer analgesics based on type and severity of pain and evaluate response  - Implement non-pharmacological measures as appropriate and evaluate response  - Consider cultural and social influences on pain and pain management  - Notify physician/advanced practitioner if interventions unsuccessful or patient reports new pain  Outcome: Progressing     Problem: INFECTION - ADULT  Goal: Absence or prevention of progression during hospitalization  Description: INTERVENTIONS:  - Assess and monitor for signs and symptoms of infection  - Monitor lab/diagnostic results  - Monitor all insertion sites, i.e. indwelling lines, tubes, and drains  - Monitor endotracheal if appropriate and nasal secretions for changes in amount and color  - Nancy appropriate cooling/warming therapies per order  - Administer medications as ordered  - Instruct and encourage patient and family to use good hand hygiene technique  - Identify and instruct in appropriate isolation precautions for identified infection/condition  Outcome: Progressing  Goal: Absence of fever/infection during neutropenic period  Description: INTERVENTIONS:  - Monitor WBC    Outcome: Progressing     Problem: SAFETY ADULT  Goal: Patient will remain free of falls  Description: INTERVENTIONS:  - Educate patient/family on patient safety including physical limitations  - Instruct patient to call for assistance with activity   - Consult OT/PT to assist with strengthening/mobility   - Keep Call bell within reach  - Keep bed low and locked with side rails adjusted as appropriate  - Keep care items and personal belongings within reach  - Initiate and maintain comfort rounds  - Make Fall Risk Sign visible to staff  - Offer Toileurs, in advance of need  - Initiate/M  - Obtain necessary fall risk management equ  - Apply yellow socks and bracelet for high fall risk patients  - Consider moving patient to room near nurses station  Outcome: Progressing  Goal: Maintain or return to baseline ADL function  Description: INTERVENTIONS:  -  Assess patient's ability to carry out ADLs; assess patient's baseline for ADL function and identify physical deficits which impact ability to perform ADLs (bathing, care of mouth/teeth, toileting, grooming, dressing, etc.)  - Assess/evaluate cause of self-care deficits   - Assess range of motion  - Assess patient's mobility; develop plan if impaired  - Assess patient's need for assistive devices and provide as appropriate  - Encourage maximum independence but intervene and supervise when necessary  - Involve family in performance of ADLs  - Assess for home care needs following discharge   - Consider OT consult to assist with ADL evaluation and planning for discharge  - Provide patient education as appropriate  Outcome: Progressing  Goal: Maintains/Returns to pre admission functional level  Description: INTERVENTIONS:  - Perform BMAT or MOVE assessment daily.   - Set and communicate daily mobility goal to care team and patient/family/caregiver. - Collaborate with rehabilitation services on mobility goals if consulted  - Perform Range omes a day. - Reposition patienours.   - Dangl  - Sta  - Ambulate patient  - Out of bed to radha  - Out of bed for iesha  - Out of bed for toileting  - Record patient progress and toleration of activity level   Outcome: Progressing     Problem: DISCHARGE PLANNING  Goal: Discharge to home or other facility with appropriate resources  Description: INTERVENTIONS:  - Identify barriers to discharge w/patient and caregiver  - Arrange for needed discharge resources and transportation as appropriate  - Identify discharge learning needs (meds, wound care, etc.)  - Arrange for interpretive services to assist at discharge as needed  - Refer to Case Management Department for coordinating discharge planning if the patient needs post-hospital services based on physician/advanced practitioner order or complex needs related to functional status, cognitive ability, or social support system  Outcome: Progressing     Problem: Prexisting or High Potential for Compromised Skin Integrity  Goal: Skin integrity is maintained or improved  Description: INTERVENTIONS:  - Identify patients at risk for skin breakdown  - Assess and monitor skin integrity  - Assess and monitor nutrition and hydration status  - Monitor labs   - Assess for incontinence   - Turn and reposition patient  - Assist with mobility/ambulation  - Relieve pressure over bony prominences  - Avoid friction and shearing  - Provide appropriate hygiene as needed including keeping skin clean and dry  - Evaluate need for skin moisturizer/barrier cream  - Collaborate with interdisciplinary team   - Patient/family teaching  - Consider wound care consult   Outcome: Progressing     Problem: Nutrition/Hydration-ADULT  Goal: Nutrient/Hydration intake appropriate for improving, restoring or maintaining nutritional needs  Description: Monitor and assess patient's nutrition/hydration status for malnutrition. Collaborate with interdisciplinary team and initiate plan and interventions as ordered. Monitor patient's weight and dietary intake as ordered or per policy. Utilize nutrition screening tool and intervene as necessary. Determine patient's food preferences and provide high-protein, high-caloric foods as appropriate.      INTERVENTIONS:  - Monitor oral intake, urinary output, labs, and treatment plans  - Assess nutrition and hydration status and recommend course of action  - Evaluate amount of meals eaten  - Assist patient with eating if necessary   - Allow adequate time for meals  - Recommend/ encourage appropriate diets, oral nutritional supplements, and vitamin/mineral supplements  - Order, calculate, and assess calorie counts as needed  - Recommend, monitor, and adjust tube feedings and TPN/PPN based on assessed needs  - Assess need for intravenous fluids  - Provide specific nutrition/hydration education as appropriate  - Include patient/family/caregiver in decisions related to nutrition  Outcome: Progressing

## 2023-07-08 NOTE — PROGRESS NOTES
SLP Bedside Swallow Assessment       07/08/23 1130   Pain Assessment   Pain Assessment Tool 0-10   Pain Score No Pain   Restrictions/Precautions   Precautions Aspiration;Bed/chair alarms;Cognitive; Fall Risk;Pain;Supervision on toilet/commode;Visual deficit   Eating   Type of Assistance Needed Set-up / clean-up;Supervision;Verbal cues   Physical Assistance Level No physical assistance   Eating CARE Score 4   Swallow Assessment   Swallow Treatment Assessment Bedside Swallow Evaluation      Patient Name: Dav Santoyo    YWNSV'T Date: 7/8/2023     Problem List  Principal Problem:    Leg hematoma, left, initial encounter  Active Problems:    Chronic pain syndrome    Mild intermittent asthma without complication    Seronegative arthropathy of multiple sites ([address])    Gastroesophageal reflux disease without esophagitis    Mixed stress and urge urinary incontinence    CHF (congestive heart failure) ([address])    Anemia    Osteoporosis    Severe protein-calorie malnutrition (HCC)    Bipolar depression (HCC)    HTN (hypertension)    Hypercalcemia    Obstructive sleep apnea    History of stroke    Dysphagia    Sinus tachycardia    Hypomagnesemia    Thrush    Night terrors      Past Medical History  Past Medical History:   Diagnosis Date    Anxiety     Asthma     Bipolar depression (HCC)     Chronic narcotic dependence ([address])     Chronic pain     Depression     Enterovirus heart infection     SHAR (obstructive sleep apnea)     Osteoarthritis     Peripheral neuropathy     Plantar fasciitis of right foot     Senile osteoporosis     Seronegative arthropathy of multiple sites ([address])     Undifferentiated connective tissue disease ([address])        Past Surgical History  Past Surgical History:   Procedure Laterality Date    BREAST IMPLANT      FIXATION KYPHOPLASTY LUMBAR SPINE      HYSTERECTOMY      INCONTINENCE SURGERY N/A     IR STROKE ALERT  2/12/2023    NASAL SEPTOPLASTY W/ TURBINOPLASTY N/A     RECTAL PROLAPSE REPAIR N/A     REPAIR RECTOCELE      RHINOPLASTY N/A            Reason for consult: new admit to unit, known to department from previous admission. Baseline swallow deficits, on puree and NTL which pt was discharged on back in March 2023. See below for summary details:    03/09/2023 VFSS Assessment Summary:    Pt presents with moderate-severe oropharyngeal dysphagia where pt demonstrated weakened lip seal noted to be mildly decreased around tsp, leading to inconsistent trace anterior loss on initial tsp presentations but likely due to increased xerostomia which was observed. Bolus control of thin and NT liquids by tsp, cup and straw was observed to be decreased, leading to premature spill to pharynx prior to swallow. Bolus manipulation is delayed given puree noting spill to pharynx prior to swallow. Mastication was PROLONGED with soft solids (both banana and peaches) administered today. Bolus formation given soft solids was observed to be decreased to where despite prolonged mastication given these textures overall effectiveness in breakdown/formulation was decreased noting that pt did swallow banana >peaches more so whole. Pt did have an episode of brief nasal regurgitation x1 w/ NT by tsp. Transition between with oral and pharyngeal stages was moderately delayed w/ puree, NT and thin textures administered. Hyolaryngeal excursion was decreased given all consistencies. It was observed that BOT retractions is weaker which it was observed that pt did have fairly consistent retention given puree and soft textures. Also observed during assessment was weakened/decreased pharyngeal constriction across textures which lead to moderate amount of pharyngeal retention (more vallecular vs pyriform sinus retention). Pt did also present w/ trace to minimal PPW w/ all consistencies observed. While pt's epiglottis was inverting during swallow, overall timing given airway protection during swallows more w/ NT and thins was decreased.  Pt was observed w/ sluggish movement/return of epiglottis intermittently during assessment. It was observed that pt did have a cricopharyngeal prominence around to C5-C6 level which did NOT impinge passage of boluses. Unfortunately, pt did have increased laryngeal penetration which ranged from high level but then deep to the level of the vocal cords as well as then mostly silent aspiration events w/ thin liquids but also NT by straw. Refer to below for observations for full report for specific event w/ penetration and aspiration. Swallow Information   Current Risks for Dysphagia & Aspiration: Weak voicing; weak cough; dysarthria; general debilitation; new neuro event; brain injury; cognitive deficit; hx neurologic dx; positioning issues  Current Symptoms/Concerns: difficulty chewing; decreased oral intake; hx of dysphagia/aspiration  Current Diet: puree/level 1 diet and nectar thick liquids   Baseline Diet: puree/level 1 diet, mechanically altered/level 2 diet and nectar thick liquids with free water protocol at home      Baseline Assessment   Behavior/Cognition: alert  Speech/Language Status: able to participate in conversation and able to follow commands  Patient Positioning: upright in chair  Pain Status/Interventions/Response to Interventions: No report of pain.       Swallow Mechanism Exam  Facial: left facial droop  Labial: decreased strength and decreased coordination  Lingual: decreased ROM and decreased strength left side  Velum: unable to visualize  Mandible: adequate ROM  Dentition: missing back molar teeth, mild oral thrush on posterior tongue  Vocal quality:weak   Volitional Cough: weak   Respiratory Status: on RA       Consistencies Assessed:  Consistencies Administered: ice chips, nectar thick and puree  Materials administered included ice chips, puree, NTL by cup, meds whole/cut in puree    Total Amount Consumed:  50% meal  180cc liquids      Oral stage:mild (give puree textures)  Lip closure: functional to utensil and cup sip  Anterior spillage: none observed  Mastication: slow and prolonged manpulation of puree  Bolus formation: decreased  Bolus control: suspect decreased with posterior spill  Transfer: mildly delayed  Oral residue: none  Pocketing: none     Pharyngeal stage:mild-moderate  Swallow promptness: mild to moderately delayed with puree and liquids  Hyolaryngeal elevation: present but judged to be decreased  Wet voice: none  Throat clear: none  Cough: none  Secondary swallows: completed piecemeal transfers and encouraged effortful swallow with all boluses  Audible swallows: present with liquids and puree     Esophageal stage:  No reports of deficits during meal however noted baseline issues with esophageal screen during VFSS where "it was observed that pt exhibited retropulsion of food stuff at the mid-esophagus level and minimal clearance over time of this food which remained in mid to lower esophagus. SLP had pt use NT wash which did push held up food in mid-lower esophagus through LES. Esophagus does appear to be tortuous as well which can impact clearance of food/liquid."    Summary:      Pt presenting with mild-moderate oral and pharyngeal dysphagia today. Symptoms or concerns included decreased bolus propulsion, decreased mastication, decreased bolus formation and suspected decreased control of liquids and puree,  suspected pharyngeal swallow delay, suspected decreased hyolaryngeal elevation upon palpation, suspected pharyngeal residue, multiple swallows, effortful swallow and audible swallows     Pt assessed with lunch meal. Lengthy discussion held with pt and daughter Ellie Mcintyre in regards to events since discharge from unit in March to now. Pt reports eating some level 2 soft solids at home and NTL with thin liquids/ice chips inbetween meals, never with meal. Pt with hospitalization at [facility] for aspiration pneumonia in May, therefore lately has been sticking to soft diet and thickened liquids.  Pt however "disliking" her foods and has had weightloss over the last few months since her stroke earlier this year. Reviewed plan to cont to monitor on current diet and trial advanced textures as able with possible repeat VFSS if warranted to assess for progress with swallow function since last one was completed in March on our unit. Oral care completed at sink using traditional means as this is how pt completed at home. Pt's posture conducive to keeping her over sink and anteriorly leaning when swishing and rinsing mouth. Pt positioned best with meals when upright in chair. Pt assessed with ice chips x3- good retrieval and control, slow oral processing and piece meal transfers of melted water with audible swallows and judged weaker swallows. Pt with no s/s aspiration with ice chips. With puree and NTL, pt able to self feed again with adequate retrieval and control. Oral processing/manipulation of purees slow and mildly prolonged/decreased. Lingual pumping present for anticipated transfers. Swallows delayed and again, judged to be weaker in completion. Per previous VFSS, pt noted to have pharyngeal retention therefore cued for secondary swallow and liquid wash. NTL by cup showed improved timing of transfers and swallow- swallows audible with both puree and NTLs. No overt s/s aspiration though during meal.     Nursing provided meds, whole/cut in puree. Pt tolerated well as "this is what I do at home". Cont to recommend at this time, if increased difficulty observed, resort to crushing. Recommendations:  Diet: puree/level 1 diet and nectar thick liquids  Meds: whole or cut with puree  Strategies: upright posture, only feed when fully alert, slow rate of feeding, small bites/sips, effortful swallow, quiet environment (tv off, limit talking, door closed, etc.) and alternating bites and sips    FULL supervision w/ meals.       Results reviewed with: patient, daughter Sudha Delgadillo RN, PCA  Aspiration precautions posted. F/u ST tx: Pt will continue to benefit from ongoing skilled dysphagia tx sessions to establish safest least restrictive diet w/o increased oropharyngeal or aspiration sxs as well as monitor ability to carryover swallow strategies independently. Plan: Cont to monitor pt on current diet   Trial upgraded textures of dysphagia level 2 and thin liquids as appropriate   May benefit from repeat VFSS if warranted to assess further progress of swallow function from last VFSS dated 3/9/2023       Swallow Assessment Prognosis   Prognosis Fair   Prognosis Considerations Age; Co-morbidities; Medical diagnosis; Medical prognosis;Previous level of function;Severity of impairments;Ability to carry over;New learning ability; Cooperation   SLP Therapy Minutes   SLP Time In 36   SLP Time Out 1300   SLP Total Time (minutes) 90   SLP Mode of treatment - Individual (minutes) 90   SLP Mode of treatment - Concurrent (minutes) 0   SLP Mode of treatment - Group (minutes) 0   SLP Mode of treatment - Co-treat (minutes) 0   SLP Mode of Treatment - Total time(minutes) 90 minutes   SLP Cumulative Minutes 90

## 2023-07-08 NOTE — PROGRESS NOTES
07/08/23 0700   Rehab Team Goals   ADL Team Goal Patient will require supervision with ADLs with least restrictive device upon completion of rehab program   Transfer Team Goal Patient will require supervision with transfers with least restrictive device upon completion of rehab program   Cognitive Team Goal Patient will return to premorbid level of cognitive activity upon completion of rehab program   Rehab Team Interventions   OT Interventions Self Care;Home Management; Therapeutic Exercise;Community Reintegration;Cognitive Reintegration; Energy Conservation;Patient/Family Education   Eating Goal   Eating Goal 05. Setup or clean-up assistance - [place] SETS UP or CLEANS UP, patient completes activity. [place] assists only prior to or following the activity. Meal Complete All meals   Diet Level Dysphagia 1;Nectar Thick  (pending progress and SLP recommendations)   Status Ongoing; Target goal - two weeks   Interventions Optimal Position;Dysphagia Education   Grooming Goal   Oral Hygiene Goal 05. Setup or clean-up assistance - [place] SETS UP or CLEANS UP, patient completes activity. [place] assists only prior to or following the activity. Task Complete Groom   Environment Stand at [employer]   Status Ongoing; Target goal - two weeks   Intervention Balance Work;Assistive Device; Tolerance Work; Therapeutic Exercise   Tub/Shower Transfer Goal   Method Shower Stall   Assist Device Seat with Back;Grab Bar;Hand Held Shower   Status Ongoing; Target goal - two weeks   Interventions ADL Training;Assistive Device   Bathing Goal   Shower/bathe self Goal 04. TOUCHING/ STEADYING assistance as patient completes activity. Environment Seated;Standing; Shower;Sponge Bath   Adaptive Equipment Grab Bar;Seat with back;Hand [employer]; Long Handle Sponge   Safety Precautions Safety Precaution;Verbal Instruction   Status Ongoing; Target goal - two weeks   Intervention ADL Training;Assistive Device; Therapeutic Exercise   Upper Body Dressing Goal Upper body dressing Goal 04. TOUCHING/ STEADYING assistance as patient completes activity. Task Upper Body;Arms in/out; Over Head;Bra   Environment Seated   Status Ongoing; Target goal - two weeks   Intervention Balance Work; Therapeutic Exercise; Tolerance Work   Lower Body Dressing Goal   Lower body dressing Goal 04. TOUCHING/ STEADYING assistance as patient completes activity. Putting on/taking off footwear Goal 04. TOUCHING/ STEADYING assistance as patient completes activity. Environment Seated;Standing   Status Ongoing; Target goal - two weeks   Intervention Balance Work;Assistive Device; Therapeutic Exercise; Tolerance Work   Toileting Transfer Goal   Toilet transfer Goal 04. Supervision or touching assistance- [place] provides VERBAL CUES or supervision throughout activity. Assistive Device Roller Walker   Safety Hand Placement   Status Ongoing; Target goal - two weeks   Intervention Balance Work;ADL Training;Assistive Device   Toileting Goal   Toileting hygiene Goal 04. Supervision or touching assistance- [place] provides VERBAL CUES or supervision throughout activity. Task Pants Up;Pants Down;Hygiene   Safety Balance   Status Ongoing; Target goal - two weeks   Intervention ADL Training;Balance Work;Assistive Device   PT Transfer Goal   Roll left and right Goal 06. Independent - Patient completes the activity by him/herself with no assistance from a helper. Sit to lying Goal 06. Independent - Patient completes the activity by him/herself with no assistance from a helper. Lying to sitting on side of bed Goal 06. Independent - Patient completes the activity by him/herself with no assistance from a helper. Sit to stand Goal 04. Supervision or touching assistance- [place] provides VERBAL CUES or supervision throughout activity. Chair/bed-to-chair transfer Goal 04. Supervision or touching assistance- [place] provides VERBAL CUES or supervision throughout activity. Car Transfer Goal 04.  Supervision or touching assistance- [place] provides VERBAL CUES or supervision throughout activity. Assistive Device Roller Walker   Environment Level Surface; Well Lit   Status Ongoing; Target goal - two weeks   Meal Prep and Kitchen Mobility   Assist Level Supervision   Status Ongoing; Target goal - two weeks  (light meal prep only (breakfast))   Medication Management   Assist Level Supervision   Status Ongoing; Target goal - two weeks

## 2023-07-08 NOTE — PROGRESS NOTES
Physical Medicine and Rehabilitation Progress Note  Marilyn Mann 70 y.o. female MRN: [id number]  Unit/Bed#: -01 Encounter: [id number]    To Review: Marilyn Mann is a 70 y.o. female with medical history of HTN, CVA in 2/2023 on eliquis (chronic dysphagia, L sided weakness, cognitive impairment), hypercalcemia,  CHF, GERD, SHAR, gout, bipolar disorder, RA on plaquenil, chronic opioid dependence on morphine for chronic back/joint pain, who presented to the [address] [place] [address] on 6/30 after a fall, unable to get up and disoriented. She thinks she tripped and fell down her porch steps, landing on he L thigh. Found to have a L lateral thigh hematoma with evidence of extravasation on imaging. Binder placed over hips, and eliquis was held but not reversed given her recent stroke. Speech consulted and noted oropharyngeal swallow likely at baseline and recommended Level 1/NTL. Recommended for VBS. Course c/b ABLA on 7/2 Hgb dropped to 6.8 and she was given 1 unit pRBC. Geriatrics was consulted, noted at baseline she is oriented but forgetful, with worsening issues since her stroke. Mini-Cog was 9/15 in 2021. They recommended checking B12. They noted she has not been on lorazepam since January, and also recommended weaning down off gabapentin if possible. On 7/4 patient had an unwitnessed fall while trying to reach for her bag on the window sill. No head strike, but landed on her L hip. GCS 15, Neuro exam was baseline. CTA ordered to look for active bleeding, which showed decreased size of hematoma with no active extravasation. Hgb did drop after her transfusion, but seems to have stablized today at 8.5. That being said, her HR was elevated today, and it appears a troponin has been ordered by the trauma team as well as a CXR. CXR showed hazy RLL opacity, likely 2/2 atelectasis, and trops trended down. Metoprolol was increased with improvement. She was admitted to the [facility] on 7/7. Chief Complaint: No new complaints    Interval History/Subjective:  No acute events overnight. Sleep was fine. Daughter present today - no new questions from her. Has had an issue with her neck posture for a long time now - unclear why. She denies any new lightheadedness, dizziness, numbness, tingling, weakness. No new CP, SOB, fevers, chills, N/V, abdominal pain. Tongue is feeling less sore. Last BM 7/7. No PVRs in the chart, but has better continence with timed voids. ROS:  A 10 point review of systems was negative except for what is noted in the HPI. Today's Changes:  1. Discussed with nursing who will check PVRs today  2. Reviewed labs. Calcium and Phos are fine. Continue cinacalcet. Mag is fine continue supplementation. 3. Giving potassium today for K 3.2 (80 mEq)  4. Hgb stable. WBC WNL. 5. Last night Toprol and minipress held and this AM toprol and aldactone held due to soft Bps   - IM adjusted parameters    Total visit time: 35 minutes, with more than 50% spent counseling/coordinating care. Counseling includes discussion with patient re: progress in therapies, functional issues observed by therapy staff, and discussion with patient regarding their current medical state and wellbeing. Coordination of patient's care was performed in conjunction with Internal Medicine service to monitor patient's labs, vitals, and management of their comorbidities. Assessment/Plan:    * Leg hematoma, left, initial encounter  Assessment & Plan  2/2 fall    6/30 CT CAP:  Large hematoma in the lateral left thigh measuring up to 9.6 cm with focus of active contrast extravasation. No acute intrathoracic or intra-abdominal injury. 7/4 CTA Pelvis:  1. Decreasing size of left lateral thigh hematoma without evidence for active extravasation. 2.  No CT findings in the visualized pelvis and thighs to explain falling hematocrit. 3.  Pancolitis with increasing inflammatory free fluid in the pelvis.  No free air, pneumatosis, or encapsulated collections demonstrated. Non-operatively managed. Did require transfusion x1  Pain management as below  Monitor Hgb. PT/OT 3-5 hours/day, 5-7 days/week  Outpatient f/u with General Surgery      Night terrors  Assessment & Plan  Home: Prazosin  Here: Prazosin  Continue melatonin  Outpatient f/u with PCP    Thrush  Assessment & Plan  Noted on admission  7/7 started on nystatin swish/spit. Hypomagnesemia  Assessment & Plan  Home: Mag oxide 800mg BID  Here: Mag oxide 400mg BID  7/9 Mag 1.9   Goals > 2. Sinus tachycardia  Assessment & Plan  Home: Toprol XL 25mg BID  Here: Same  On 7/6 with HR in 115-120. EKG showed sinus tach. Trops trended and negative. - Associated with transient/mild SOB   - She was only being given Toprol daily in the hospital   - Increased to BID with improvement  Lytes with K > 4, Mag > 2  Monitor and adjust regimen as appropriate  IM consulted to assist with management  Outpatient f/u with Dr. Anna Wood    Dysphagia  Assessment & Plan  2/2 previous CVA earlier this year. Noted to have impaired oropharyngeal swallow  Was on free water protocol at home - holding for now due to thrush  On Level 1/NTL  - Inpatient SLP recommended VBS  - Consulting SLP here  - Aspiration precautions  - Strict oral hygiene prior to trials    History of stroke  Assessment & Plan  MRI brain 2/22 - 1.  New small acute /more recent interval lacunar infarction in the right cerebral peduncle (series 4, image 14). 2.  Extensive multifocal large, previously present right middle cerebral artery infarction which is evolving with superimposed areas of evolving hemorrhage likely related to hemorrhagic transformation of subacute right MCA infarction.  No significant   mass effect. 3.  Mild, chronic microangiopathy. S/P thrombectomy 2/12 by Dr Chau Hamilton   Secondary stroke prophylaxis at this time with Eliquis 5 mg twice daily   PT, OT, SLP.    Review modifiable risk factors and provide stroke education  Monitor for poststroke pain  Eliquis for secondary stroke prophylaxis    Outpatient f/u with Neurology    Obstructive sleep apnea  Assessment & Plan  Previously non-compliant with hospital or home CPAP  Outpatient f/u with PCP. Hypercalcemia  Assessment & Plan  Suspect 2/2 primary hyperparathyroidism  7/8 iCal 1.12, Phos 3.3  Home: Sensipar 30mg daily  Here: Same  Recommend outpatient f/u with Endocrinology      HTN (hypertension)  Assessment & Plan  Tends to run on the low/normal side. Home: Toprol XL 25mg BID, Spironolactone 25mg daily  Here: Same  Has not been able to get meds at times due to not meeting parameters. Monitor and adjust as appropriate  IM consulted to assist with management    Bipolar depression [facility])  Assessment & Plan  Home: Bupropion 100mg BID, Cymbalta 60mg BID, Ativan 0.25mg BID PRN (however this has not been filled since January 2023 as per PDMP)  Here: Bupropion 100mg BID, Cymbalta 60mg BID  Consulting rehab psychology for support  Adjust as appropriate  Outpatient f/u with PCP    Severe protein-calorie malnutrition ([address])  Assessment & Plan  Malnutrition Findings:                                 BMI Findings: Body mass index is 20.89 kg/m². Consulted nutrition    Osteoporosis  Assessment & Plan  Hx L1 compression fx s/p kyphoplasty  Was previously on Prolia - last given in 2020   - Held due to dental issues  Recommend outpatient f/u with Endocrinology  2/23 Vitamin D level is 41.7    Anemia  Assessment & Plan  2/2 L leg hematoma (most recent imaging negative for extravasation)  At home on eliquis  No reversal inpatient due to recent CVA  Prior to admission Hgb 10-12. Ravi 7/2 6.8 - given 1 unit pRBC  7/8 Hgb 8.5 > 9.2  Monitor, transfuse as appropriate.   IM consulted to assist with management    CHF (congestive heart failure) ([address])  Assessment & Plan  Wt Readings from Last 3 Encounters:   07/07/23 55.8 kg (123 lb 0.3 oz)   06/30/23 55.3 kg (122 lb) 06/06/23 47.9 kg (105 lb 9.6 oz)     2/2023 Echo 30% with G2DD, moderate AI  Home: Toprol XL 25mg BID, Aldactone 25mg daily   - Limited by BP - no ACE/ARB   - no SGLT2i due to recurrent UTI  Here: Toprol XL 25mg BID, spironolactone 25mg daily   Unclear etiology of cardiomyopathy  Possible consideration for ischemic work-up outpatient, poor candidate for aggressive procedures   Monitor I/O/daily weights, volume status, lytes. IM consulted to assist with management  Outpatient f/u with Dr. Martina Chanel      Mixed stress and urge urinary incontinence  Assessment & Plan  Frankly incontinent here  Initiate timed voids and scans  Has history of recurrent UTIs - will check UA on admission here given extent of incontinence  Close continence care      Gastroesophageal reflux disease without esophagitis  Assessment & Plan  Home: PPI daily  Here: PPI daily    Seronegative arthropathy of multiple sites [facility])  Assessment & Plan  Follows with Dr. Genoveva Ervin with Rheum and is on Hydroxychloroquine 200mg BID. Continue home regimen and follow-up with rheumatology on discharge    Mild intermittent asthma without complication  Assessment & Plan  No acute exacerbation  Home: Albuterol PRN  Here: Same    Chronic pain syndrome  Assessment & Plan  Joint and back pain  Follows with Dr. Gil Blanca with [facility]  Reviewed PDMP - at home on MSIR 7.5mg BID PRN. Here: Oxycodone 2.5-5mg PRN. - Possible transition back to home meds  - Outpatient f/u with Dr. Gil Blanca. Health Maintenance  #Delirium/Sleep: At high risk. See cognitive impairment above. #Pain: Tylenol scheduled 650mg Q6hr, Gabapentin 400mg BID (will try to wean down here), Cymbalta 60mg BID, Oxycodone 2.5-5mg PRN  #Bowel: Last BM 7/7. PRN bisacodyl suppository/miralax  #Bladder: Voiding, incontinent. Timed voids Q4hr with PVR x3. Will also check UA on admission. #Skin/Pressure Injury Prevention: Turn Q2hr in bed, with weight shifts [id number] in wheelchair. Float heels in bed.   #DVT Prophylaxis: Fully anticoagulated on eliquis 5mg BID, SCDs. #GI Prophylaxis: Protonix already for chronic issues. #Code Status: Full Code  #FEN: Level 1/NTL, Ensure HP Pudding- Karyn Rumps at Breakfast/Lunch/Dinner  #Dispo: ELOS 10-14 days with goals to discharge home at Sup-mod Ind level of function      Objective:    Functional Update: pending evaluation  PT  OT  SLP    Allergies per EMR    Physical Exam:  Temp:  [97.4 °F (36.3 °C)-98.1 °F (36.7 °C)] 97.8 °F (36.6 °C)  HR:  [78-90] 85  Resp:  [18] 18  BP: (106-120)/(58-72) 120/70  Oxygen Therapy  SpO2: 98 %      Gen: No acute distress, Well-nourished, well-appearing. HEENT: Moist mucus membranes. Neck posture - seems to have a retrocollis? Cardiovascular: Regular rate, rhythm, S1/S2. Distal pulses palpable  Heme/Extr: No edema  Pulmonary: Non-labored breathing, lungs CTAB  : No lopes  GI: Soft, non-tender, non-distended. MSK: ROM is full in all extremities. No effusions or deformities. Bulk is symmetric. See below for MMT scores. Integumentary: Skin is warm, dry. Neuro: AAOx3,  Speech is intact. Appropriate to questioning. Psych: Normal mood and affect. Diagnostic Studies: reviewed, no new imaging  No results found.     Laboratory:  Reviewed   Results from last 7 days   Lab Units 07/08/23  0537 07/06/23  0534 07/05/23  0552   HEMOGLOBIN g/dL 9.2* 8.5* 7.4*   HEMATOCRIT % 29.2* 26.4* 22.5*   WBC Thousand/uL 6.56 5.31 4.34     Results from last 7 days   Lab Units 07/08/23  0537 07/05/23  0552 07/04/23  0458   BUN mg/dL 15 7 7   POTASSIUM mmol/L 3.2* 3.6 3.7   CHLORIDE mmol/L 114* 114* 116*   CREATININE mg/dL 0.88 0.72 0.71   AST U/L 15  --   --    ALT U/L 21  --   --             Patient Active Problem List   Diagnosis   • Chronic pain syndrome   • Mild intermittent asthma without complication   • Ambulatory dysfunction   • Seronegative arthropathy of multiple sites (HCC)   • Gastroesophageal reflux disease without esophagitis   • Mixed stress and urge urinary incontinence   • CHF (congestive heart failure) ([facility])   • Anemia   • Undifferentiated connective tissue disease ([facility])   • Primary generalized (osteo)arthritis   • Peripheral neuropathy   • Osteoporosis   • Hx of compression fracture of spine   • Lumbar spondylosis   • Cervical spondylosis   • Closed fracture of multiple ribs of right side   • Traumatic pneumothorax   • Closed fracture of transverse process of lumbar vertebra [facility])   • Liver contusion   • Fall   • Acute pain due to trauma   • Right shoulder pain   • Severe protein-calorie malnutrition ([facility])   • Bipolar depression ([facility])   • Rheumatoid arthritis of multiple sites with negative rheumatoid factor ([facility])   • 'light-for-dates' infant with signs of fetal malnutrition   • LAILA (acute kidney injury) ([address])   • Polypharmacy   • Cardiomyopathy ([address])   • Vitamin D insufficiency   • Stroke ([address])   • HTN (hypertension)   • HFrEF (heart failure with reduced ejection fraction) ([facility])   • Hypercalcemia   • Discoloration of skin of foot   • Abdominal pain   • Acute CVA (cerebrovascular accident) ([address])   • History of bacteremia   • Obstructive sleep apnea   • History of stroke   • AMS (altered mental status)   • Gout of foot   • Dysphagia   • Wound of left ankle   • Hypokalemia   • Sinus tachycardia   • Hypomagnesemia   • Insomnia   • Nocturnal hypoxia   • Stroke-like symptoms   • Cerebral aneurysm, nonruptured   • Leg hematoma, left, initial encounter   • Thrush   • Night terrors         Medications  Current Facility-Administered Medications   Medication Dose Route Frequency Provider Last Rate   • acetaminophen  650 mg Oral Q6H Oscar Shipley MD     • albuterol  2 puff Inhalation Q6H PRN Nishi Rojas MD     • apixaban  5 mg Oral BID Nishi Rojas MD     • buPROPion  100 mg Oral BID Nishi Rojas MD     • cinacalcet  30 mg Oral Daily With Breakfast Nishi Rojas MD     • DULoxetine  60 mg Oral BID Nishi Rojas MD     • gabapentin  400 mg Oral BID Nancy Villegas MD     • hydroxychloroquine  200 mg Oral BID With Meals Nancy Villegas MD     • lamoTRIgine  150 mg Oral HS Nancy Villegas MD     • magnesium Oxide  400 mg Oral BID Nancy Villegas MD     • melatonin  3 mg Oral HS Nancy Villegas MD     • metoprolol succinate  25 mg Oral BID Nancy Villegas MD     • nystatin  500,000 Units Swish & Spit 4x Daily Nancy Villegas MD     • ondansetron  4 mg Oral Q6H PRN Nancy Villegas MD     • oxyCODONE  5 mg Oral Q4H PRN Nancy Villegas MD     • oxyCODONE  2.5 mg Oral Q4H PRN Nancy Villegas MD     • pantoprazole  40 mg Oral Early Morning Nancy Villegas MD     • potassium chloride  40 mEq Oral BID Nancy Villegas MD     • prazosin  1 mg Oral HS Nancy Villegas MD     • spironolactone  25 mg Oral Daily Nancy Villegas MD            ** Please Note: Fluency Direct voice to text software may have been used in the creation of this document.  **

## 2023-07-08 NOTE — PROGRESS NOTES
07/08/23 0700   Patient Data   Rehab Impairment Impairment of mobility, safety and Activities of Daily Living (ADLs) due to Other Disabling Impairments:  13  Other Disabling Impairments   Etiologic Diagnosis Hematoma of leg, left, initial encounter (S80.12XA   Date of Onset 06/30/23   Support System   Relationship lives with son and son's GF, dtr comes over 3x per week to A with showering   Able to provide 24 hour supervision   (pt is inconsistent historian, pt notes she is never alone at home however has had multiple unattended falls, will need to discuss with family for further detail)   Home Setup   Type of Home Multi Level   Method of Entry Stairs   Number of Stairs 1   Number of Stairs in Home 12   In [address] Bilateral   First [address] Full; Shower;Curtain;Grab Bars   First Floor Bathroom Accessibility Shower chair   First Floor Setup Available Yes   Available Equipment Vazquez Criss; Shower Chair   Baseline Information   Transportation Family/friends drive  (Pt reports driving short distances however uncertain of accuracy due to safety concerns)   Prior Device(s) Used Roller Walker; Wheelchair; Shower Chair   Prior IADL Participation   Money Management   (family manages)   Meal Preparation Partial Participation  (simple meal prep only (breakfast), pt son and his GF do most of the cooking per pt report)   Laundry   (family completes due to being in the basement)   Home Cleaning   (family completes)   Prior Level of Function   Self-Care 2. Needed Some Help - Patient needed a partial assistance from another person to complete activities. Indoor-Mobility (Ambulation) 3. Independent - Patient completed the activities by him/herself, with or without an assistive device, with no assistance from a helper. Stairs 2. Needed Some Help - Patient needed a partial assistance from another person to complete activities. Functional Cognition 2.  Needed Some Help - Patient needed a partial assistance from another person to complete activities. Prior Assistance Needed for Driving;Household Chores/Cleaning;Meal Preparation;Medication Management;Money Management; Shopping   Prior Device Used AMY Esteban in the Last Year   Number of falls in the past 12 months   (3-4)   Type of Injury Associated with Fall Injury   Psychosocial   Psychosocial (WDL) WDL   Patient Behaviors/Mood Appropriate for age;Pleasant   Restrictions/Precautions   Precautions Aspiration;Bed/chair alarms;Cognitive; Fall Risk;Pain;Supervision on toilet/commode;Visual deficit  (L visual field deficit)   Pain Assessment   Pain Assessment Tool 0-10   Pain Score 5   Pain Location/Orientation Orientation: Left; Location: Hip;Location: Leg   Pain Onset/Description Onset: Ongoing;Frequency: Intermittent; Descriptor: Throbbing   Effect of Pain on Daily Activities limits activity tolerance and indep for LB dressing   Patient's Stated Pain Goal No pain   Hospital Pain Intervention(s) Rest   Eating Assessment   Meal Assessed Breakfast   QI: Swallowing/Nutritional Status Therapeutic Diet   Current Diet Nectar Thick  (level 1 puree diet)   Intake Mode Self   Opens Packages No   Type of Assistance Needed Set-up / clean-up   Physical Assistance Level No physical assistance   Eating CARE Score 5   Oral Hygiene   Type of Assistance Needed Physical assistance   Physical Assistance Level 51%-75%   Comment ModAx1 in stance however pt too fatigued to complete in stance at sink and returned to sit   Oral Hygiene CARE Score 2   Grooming   Able To Wash/Dry Hands;Brush/Clean Teeth;Wash/Dry Face;Comb/Brush Hair   Limitation Noted In Coordination;Neglect;Strength;Timeliness   Findings seated in WC with set up assist for oral hygiene and Marika for combing/brushing hair, seated with inc time to wash face   Tub/Shower Transfer   Reason Not Assessed Endurance; Safety;Sponge Bath;Balance   Findings Pt reports having walk in shower at home and shower chair, grab bars and handheld shower head, pt reports her dtr A 3x a week for shower completion   Shower/Bathe Self   Type of Assistance Needed Physical assistance   Physical Assistance Level 26%-50%   Comment A to bathe BL LE 2* fatigue and A for washing RUE 2* LUE weakness/neglect from hx of stroke. steadying A from OT in stance for pt to complete santiago/rear bathing   Shower/Bathe Self CARE Score 3   Bathing   Assessed Bath Style Sponge Bath   Anticipated D/C Bath Style Sponge Bath; Shower   Able to [address] Gerardo No   Able to [employer] Temperature No   Able to Wash/Rinse/Dry (body part) Left Arm;L Upper Leg;R Upper Leg;Chest;Perineal Area; Abdomen;Buttocks   Limitations Noted in Balance; Coordination; Endurance; Safety;Timeliness;Strength;Vision   Positioning Seated;Standing   Dressing/Undressing Clothing   Remove UB Clothes   (pullover sports bra)   Don UB Clothes Pullover Shirt  (tight pullover tanktop)   Type of Assistance Needed Physical assistance   Physical Assistance Level 51%-75%   Comment A to thread over LUE and up over head and mgmt down over trunk in back, pt notes her dtr comes over to help at least 3x per week at baseline but on days when she doesn't come over it can take pt up to or more than a half hour to get dressed by herself at baseline   Upper Body Dressing CARE Score 2   Remove LB Clothes Undergarment;Socks   Don LB Clothes Undergarment;Pants;Socks   Type of Assistance Needed Physical assistance   Physical Assistance Level 51%-75%   Comment A to thread brief/pants over BL feet and pt then able to manage up toward hips, A with CM up over left hip 2* LUE weakness at baseline, Marika for steadying in stance and inc time for LB dressing. Lower Body Dressing CARE Score 2   Limitations Noted In Balance; Coordination; Endurance; Safety;Strength;Timeliness;Vision   Positioning Supported Sit;Standing   Putting On/Taking Off Footwear   Type of Assistance Needed Physical assistance   Physical Assistance Level 26%-50%   Comment pt is able to doff socks with inc time, Marika to start sock on left foot   Putting On/Taking Off Footwear CARE Score 3   Toileting Hygiene   Type of Assistance Needed Physical assistance   Physical Assistance Level 26%-50%   Comment Marika for CM up over L hip, able to manage down   [place] Score 3   Toilet Transfer   Surface Assessed Standard Toilet   Transfer Technique Standard   Limitations Noted In Balance; Endurance;Confidence; Safety;LE Strength; Sequencing;UE Strength   Adaptive Equipment Walker;Grab Bar   Positioning Concerns Cognition; Safety; Arm Rest   Type of Assistance Needed Physical assistance   Physical Assistance Level 26%-50%   Comment Marika with RW, mod VC, pt able to ambulate short distance from bed into bathroom to sit on standard toilet with cues for grab bar   Toilet Transfer CARE Score 3   Toileting   Able to Pull Clothing down yes, up no. Limitations Noted In Balance; Coordination; Safety;UE Strength;LE Strength   Adaptive Equipment Grab Bar   Transfer Bed/Chair/Wheelchair   Positioning Concerns Cognition   Limitations Noted In Balance;Confidence; Coordination;Pain Management;Problem Solving; Sequencing;UE Strength;LE Strength   Adaptive Equipment Roller Walker   Type of Assistance Needed Physical assistance;Verbal cues   Physical Assistance Level 25% or less   Comment SPT  w/ RW from EOB to WC, mod VC for hand placement and body positioning for safety   Chair/Bed-to-Chair Transfer CARE Score 3   Lying to Sitting on Side of Bed   Type of Assistance Needed Incidental touching   Physical Assistance Level No physical assistance   Comment CGA with inc time   Lying to Sitting on Side of Bed CARE Score 4   Sit to Stand   Type of Assistance Needed Physical assistance   Physical Assistance Level 25% or less   Comment Marika with RW, mod VC to push up to stand from surfaces with pt frequently reaching for RW to attempt to pull to stand unsafely, limited carryover for recall to push up to stand   Sit to Stand CARE Score 3   Comprehension   QI: Comprehension 3. Usually Understands: Understands most conversations, but misses some part/intent of message. Requires cues at times to understand. Expression   QI: Expression 3. Exhibits some difficulty with expressing needs and ideas (e.g., some words or finishing thoughts) or speech is not clear   RUE Assessment   RUE Assessment WFL   LUE Assessment   LUE Assessment X  (weakness from Hx of stroke)   Coordination   Movements are Fluid and Coordinated 0   Cognition   Overall Cognitive Status Impaired   Arousal/Participation Alert;Arousable   Attention Attends with cues to redirect   Orientation Level Oriented X4   Memory Decreased short term memory;Decreased recall of precautions;Decreased recall of recent events   Following Commands Follows one step commands with increased time or repetition   Vision   Vision Comments L visual field cut   [facility]   Patient's Discharge Plan To return home with family   Patient's Rehab Expectations "To not be afraid of falling and to be more independent". Barriers to Discharge Home Limited Family Support; Unsafe Home Setup; Decreased Cognitive Function;Decreased Strength;Decreased Endurance; Safety Considerations;Pain   Impressions Pt is a 70 y.o. female seen for OT evaluation s/p admit to [facility] on 6/30/2023 following a mechanical fall at home down the step outside her house, landing on Left thigh resulting in left lateral thigh hematoma with evidence of extravasation on imaging. Binder placed over hips, and eliquis was held but not reversed given pt Hx of recent stroke. Pt PMH includes HTN, CVA in 2/2023 on eliquis (chronic dysphagia, L sided weakness, cognitive impairment, L visual field cut), hypercalcemia,  CHF, GERD, SHAR, gout, bipolar disorder, RA on plaquenil, chronic opioid dependence on morphine for chronic back/joint pain. Pt on Level 1 dsyhpia/NTL at baseline.  Pt reports living with son and his GF PTA. Pt reports PLOF as needing some assistance for bathing/dressing with dtr coming 3x per week to help her shower, having someone with her during grooming tasks, staying on first floor of home and walking with RW though pt does have WC at home for distances. Pt reports having the following DME available at home: WC, RW, grab bars, handheld shower head, and shower chair. Pt reports 3-4 falls at home. Upon evaluation, pt current level of fxn: modA for UB/LB dressing with inc time, Marika for footwear, Marika with mod VC with RW for stand pivot transfers, modA for toileting, and modA for grooming due to the following deficits impacting occupational performance: deconditioning, impaired stand tolerance, impaired standing balance, pain mgmt, dec UE/LE strength, impaired cognition/decreased safety, and Hx of multiple falls and pt self report of fear of falling. Based on OT evaluation, pt demonstrates good rehab potential and would benefit from skilled OT services to reach pt goals of supervision w/ ELOS 2 weeks to return to least restrictive environment pending pt progress with family support and emphasis on caregiver education/resources to decrease pt risk of falls and rehospitalization.    OT Therapy Minutes   OT Time In 0700   OT Time Out 0830   OT Total Time (minutes) 90   OT Mode of treatment - Individual (minutes) 90   OT Mode of treatment - Concurrent (minutes) 0   OT Mode of treatment - Group (minutes) 0   OT Mode of treatment - Co-treat (minutes) 0   OT Mode of Treatment - Total time(minutes) 90 minutes   OT Cumulative Minutes 90   Cumulative Minutes   Cumulative therapy minutes 90

## 2023-07-09 PROBLEM — F05 SUNDOWNING: Status: ACTIVE | Noted: 2023-07-09

## 2023-07-09 PROCEDURE — 99232 SBSQ HOSP IP/OBS MODERATE 35: CPT | Performed by: PHYSICAL MEDICINE & REHABILITATION

## 2023-07-09 PROCEDURE — 99232 SBSQ HOSP IP/OBS MODERATE 35: CPT | Performed by: INTERNAL MEDICINE

## 2023-07-09 RX ORDER — NITROFURANTOIN 25; 75 MG/1; MG/1
100 CAPSULE ORAL 2 TIMES DAILY WITH MEALS
Status: COMPLETED | OUTPATIENT
Start: 2023-07-09 | End: 2023-07-14

## 2023-07-09 RX ORDER — SIMETHICONE 80 MG
80 TABLET,CHEWABLE ORAL
Status: DISCONTINUED | OUTPATIENT
Start: 2023-07-09 | End: 2023-07-29 | Stop reason: HOSPADM

## 2023-07-09 RX ADMIN — APIXABAN 5 MG: 5 TABLET, FILM COATED ORAL at 10:29

## 2023-07-09 RX ADMIN — NYSTATIN 500000 UNITS: 100000 SUSPENSION ORAL at 12:00

## 2023-07-09 RX ADMIN — NYSTATIN 500000 UNITS: 100000 SUSPENSION ORAL at 17:17

## 2023-07-09 RX ADMIN — PANTOPRAZOLE SODIUM 40 MG: 40 TABLET, DELAYED RELEASE ORAL at 06:12

## 2023-07-09 RX ADMIN — GABAPENTIN 400 MG: 400 CAPSULE ORAL at 17:17

## 2023-07-09 RX ADMIN — NYSTATIN 500000 UNITS: 100000 SUSPENSION ORAL at 21:07

## 2023-07-09 RX ADMIN — MELATONIN TAB 3 MG 3 MG: 3 TAB at 20:29

## 2023-07-09 RX ADMIN — BUPROPION HYDROCHLORIDE 100 MG: 100 TABLET, FILM COATED ORAL at 10:35

## 2023-07-09 RX ADMIN — CINACALCET 30 MG: 30 TABLET, FILM COATED ORAL at 10:35

## 2023-07-09 RX ADMIN — Medication 2.5 MG: at 14:35

## 2023-07-09 RX ADMIN — ONDANSETRON 4 MG: 4 TABLET, ORALLY DISINTEGRATING ORAL at 14:31

## 2023-07-09 RX ADMIN — MAGNESIUM OXIDE TAB 400 MG (241.3 MG ELEMENTAL MG) 400 MG: 400 (241.3 MG) TAB at 17:17

## 2023-07-09 RX ADMIN — LAMOTRIGINE 150 MG: 100 TABLET ORAL at 21:07

## 2023-07-09 RX ADMIN — DULOXETINE HYDROCHLORIDE 60 MG: 60 CAPSULE, DELAYED RELEASE ORAL at 10:31

## 2023-07-09 RX ADMIN — MAGNESIUM OXIDE TAB 400 MG (241.3 MG ELEMENTAL MG) 400 MG: 400 (241.3 MG) TAB at 10:31

## 2023-07-09 RX ADMIN — GABAPENTIN 400 MG: 400 CAPSULE ORAL at 10:31

## 2023-07-09 RX ADMIN — ACETAMINOPHEN 650 MG: 325 TABLET, FILM COATED ORAL at 20:29

## 2023-07-09 RX ADMIN — ACETAMINOPHEN 650 MG: 325 TABLET, FILM COATED ORAL at 10:32

## 2023-07-09 RX ADMIN — BUPROPION HYDROCHLORIDE 100 MG: 100 TABLET, FILM COATED ORAL at 17:18

## 2023-07-09 RX ADMIN — APIXABAN 5 MG: 5 TABLET, FILM COATED ORAL at 17:17

## 2023-07-09 RX ADMIN — SIMETHICONE 80 MG: 80 TABLET, CHEWABLE ORAL at 21:07

## 2023-07-09 RX ADMIN — HYDROXYCHLOROQUINE SULFATE 200 MG: 200 TABLET ORAL at 17:17

## 2023-07-09 RX ADMIN — HYDROXYCHLOROQUINE SULFATE 200 MG: 200 TABLET ORAL at 10:34

## 2023-07-09 RX ADMIN — METOPROLOL SUCCINATE 25 MG: 25 TABLET, FILM COATED, EXTENDED RELEASE ORAL at 21:07

## 2023-07-09 RX ADMIN — NITROFURANTOIN (MONOHYDRATE/MACROCRYSTALS) 100 MG: 75; 25 CAPSULE ORAL at 17:17

## 2023-07-09 RX ADMIN — DULOXETINE HYDROCHLORIDE 60 MG: 60 CAPSULE, DELAYED RELEASE ORAL at 17:17

## 2023-07-09 NOTE — ASSESSMENT & PLAN NOTE
Does wake up confused at night, but is easily redirectable  - More confusion, than agitation  - Wandered all the way down to the cafeteria on the 1st floor on 7/9  - No injuries  - Brought back to her room  - Found to have a UTI on admission and treated with Rubio Vora. - Initially was able to wean down from Virtual 1:1 to Frequent Checks. - Unfortunately on 7/19 had another fall with some confusion   - Found to have another UTI and started on enterococcus   - Now on 1:1 indefinitely. - Floor mats.    - Delirium precautions

## 2023-07-09 NOTE — NURSING NOTE
Pt wandered out of bed and ended up on first floor in the cafeteria. According to her, she thought that her bathroom was her closet and forgot she was in the hospital. She did not use the call bell either. She is back in bed with alarm on most sensitive settings. vitals stable. Physician on duty notified. Ordered virtual sitter overnight, low bed, floor mats and bed alarms. Will continue to monitor.

## 2023-07-09 NOTE — PROGRESS NOTES
Internal Medicine Progress Note  Patient: Shelby Stanford  Age/sex: 70 y.o. female  Medical Record #: [id number]      ASSESSMENT/PLAN: (Interval History)  Shelby Stanford is seen and examined and management for following issues:    Left lateral thigh hematoma   • Stable  • Will watch hemoglobin     ABLA  • S/p 1 unit PRBCs 7/2/23     Thrush  • Continue Nystatin     Chronic systolic heart failure   • Originally diagnosed 2004 when LVEF then was 10-15% and felt to be NICM  • Her LVEF did recover into 50's then fell when she had her CVA in 2/2023 to 30%  • Also has moderate AI/gr 2 DD  • Had been seeing Cardiology at [facility] but now sees Dr Stefany Ernst  • She declined a LifeVest in 2/2023 when LVEF was found to be 30% which is most current  • Had been on Coreg in past but HF team switched her to Toprol when she had her stroke 2/2023  • Her soft BPs have limited starting her on ACEI/ARB as OP  • Home:  Toprol XL 25mg BID/Aldactone 25mg qd  • Here:  Toprol XL 25 mg BID (inc from qd 7/6/23)/Aldactone 25mg qd  • On 7/7 night, Toprol and Minipress were held; on 7/8 AM Toprol and Aldactone held 2/2 soft BPs so reduced hold parameter on Toprol to <100 and increased hold on Minipress to 120  • Minipress (she takes for nightmares) held last evening 7/8/23 and Toprol held this AM for 94/50. Aldactone was held last 2 days     Hypercalcemia  • Continue Cinacalcet here as was taking at home     HS hypoxia   • using 4L NC HS at home  • Is using O2 at night but amt not documented.   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  • Has residual left sided weakness  • Had recrudescence of CVA 4/2023 with worsening left facial droop  • Continue Eliquis     Dysphagia  • Had admission for aspiration PNA at [facility] 5/2023 tx'd with Levaquin and Flagyl  • Continue pureed diet with NTL as at home  • Continue Ensure HP pudding TID  • ST following     Bipolar depression  • Home:  Wellbutrin 100mg BID/Cymbalta 60mg BID/Lamictal 150mg qhs  • Here:  Wellbutrin 100mg BID/Cymbalta 60mg BID/Lamictal 150mg qhs     Rheumatoid arthritis  • Continue Plaquenil 200mg BID as at home     Chronic pain/chronic opioid use  • 2/2 RA and lumbar spondylosis  • Pain management per PMR     Hx incidental right superior hypophyseal artery aneurysm  • Follows with NS Dr Lizzie Winkler     CRE urine 3/2023  • Maintain contact isolation     Dye allergy   • Was dye prepped on 7/4/23 with Benadryl 50mg IV x 1 w/o issues     Incidental findings  • Pancolitis with increasing inflammatory free fluid in pelvis  • Calcified granuloma RLL     Hypokalemia  • Got Kdur 40 meq x 2 on 7/8/23     UTI  · Urine cx sent by Dr Sean Bridges and currently is >100,000 of Enterococcus faecalis. She placed on Macrobid until sensitivities are back      Discharge date:  Team       The above assessment and plan was reviewed and updated as determined by my evaluation of the patient on 7/9/2023.     Labs:   Results from last 7 days   Lab Units 07/08/23  0537 07/06/23  0534   WBC Thousand/uL 6.56 5.31   HEMOGLOBIN g/dL 9.2* 8.5*   HEMATOCRIT % 29.2* 26.4*   PLATELETS Thousands/uL 279 229     Results from last 7 days   Lab Units 07/08/23  0537 07/05/23  0552   SODIUM mmol/L 144 144   POTASSIUM mmol/L 3.2* 3.6   CHLORIDE mmol/L 114* 114*   CO2 mmol/L 25 25   BUN mg/dL 15 7   CREATININE mg/dL 0.88 0.72   CALCIUM mg/dL 8.3 7.5*                   Review of Scheduled Meds:  Current Facility-Administered Medications   Medication Dose Route Frequency Provider Last Rate   • acetaminophen  650 mg Oral Q6H [address] Namrata Verduzco MD     • albuterol  2 puff Inhalation Q6H PRN Namrata Verduzco MD     • apixaban  5 mg Oral BID Namrata Verduzco MD     • buPROPion  100 mg Oral BID Namrata Verduzco MD     • cinacalcet  30 mg Oral Daily With Richelle Orellana MD     • DULoxetine  60 mg Oral BID Eve Lanier MD     • gabapentin  400 mg Oral BID Eve Lanier MD     • hydroxychloroquine  200 mg Oral BID With Meals Eve Lanier MD     • lamoTRIgine  150 mg Oral HS Eve Lanier MD     • magnesium Oxide  400 mg Oral BID Eve Lanier MD     • melatonin  3 mg Oral HS Eve Lanier MD     • metoprolol succinate  25 mg Oral BID LAURA Santiago     • nitrofurantoin  100 mg Oral BID With Meals Eve Lanier MD     • nystatin  500,000 Units Swish & Spit 4x Daily Eve Lanier MD     • ondansetron  4 mg Oral Q6H PRN Eve Lanier MD     • oxyCODONE  5 mg Oral Q4H PRN Eve Lanier MD     • oxyCODONE  2.5 mg Oral Q4H PRN Eve Lanier MD     • pantoprazole  40 mg Oral Early Morning Eve Lanier MD     • prazosin  1 mg Oral HS LAURA Santiago     • spironolactone  25 mg Oral Daily Eve Lanier MD         Subjective/ HPI: Patient seen and examined. Patients overnight issues or events were reviewed with nursing or staff during rounds or morning huddle session. New or overnight issues include the following:     Overnight she wandered out of her room and down to the cafeteria on the first floor so now on virtual 1:1. Offers no complaints    ROS:   A 10 point ROS was performed; negative except as noted above.        Imaging:     No orders to display       *Labs /Radiology studies reviewed  *Medications reviewed and reconciled as needed  *Please refer to order section for additional ordered labs studies  *Case discussed with primary attending during morning huddle case rounds    Physical Examination:  Vitals:   Vitals:    07/08/23 2104 07/09/23 0200 07/09/23 0641 07/09/23 1030   BP: 110/60 110/60 111/60 94/50   BP Location: Left arm   Left arm   Pulse: 85 90 92 80   Resp: 18 18 18    Temp: 97.6 °F (36.4 °C) 97.6 °F (36.4 °C) 97.9 °F (36.6 °C)    TempSrc: Oral Oral Axillary    SpO2:  98% 95%    Weight:   54 kg (119 lb)    Height:           General Appearance: no distress, conversive  HEENT:  External ear normal.  Nose normal w/o drainage. Mucous membranes are moist. Oropharynx is clear. Conjunctiva clear w/o icterus or redness. Neck:  Supple, normal ROM  Lungs: BBS without crackles/wheeze/rhonchi; respirations unlabored with normal inspiratory/expiratory effort. No retractions noted. On RA  CV: regular rate and rhythm; no rubs/gallops, +murmur. PMI normal   ABD: Abdomen is soft. Bowel sounds all quadrants. Nontender with no distention. EXT: no edema  Skin: normal turgor, normal texture, no rashes  Psych: affect normal, mood normal  Neuro: AA     The above physical exam was reviewed and updated as determined by my evaluation of the patient on 7/9/2023. Invasive Devices     Peripheral Intravenous Line  Duration           Long-Dwell Peripheral IV (Midline) 47/06/14 Right Basilic 5 days                   VTE Pharmacologic Prophylaxis: Eliquis  Code Status: Level 1 - Full Code  Current Length of Stay: 2 day(s)      Total time spent:  30 minutes with more than 50% spent counseling/coordinating care. Counseling includes discussion with patient re: progress  and discussion with patient of his/her current medical state/information. Coordination of patient's care was performed in conjunction with primary service. Time invested included review of patient's labs, vitals, and management of their comorbidities with continued monitoring. In addition, this patient was discussed with medical team including physician and advanced extenders. The care of the patient was extensively discussed and appropriate treatment plan was formulated unique for this patient. Medical decision making for the day was made by supervising physician unless otherwise noted in their attestation statement. ** Please Note:  voice to text software may have been used in the creation of this document.  Although proof errors in transcription or interpretation are a potential of such software**

## 2023-07-09 NOTE — PROGRESS NOTES
Physical Medicine and Rehabilitation Progress Note  Gregorio Domingo 70 y.o. female MRN: [id number]  Unit/Bed#: -01 Encounter: [id number]    To Review: Gregorio Domingo is a 70 y.o. female with medical history of HTN, CVA in 2/2023 on eliquis (chronic dysphagia, L sided weakness, cognitive impairment), hypercalcemia,  CHF, GERD, SHAR, gout, bipolar disorder, RA on plaquenil, chronic opioid dependence on morphine for chronic back/joint pain, who presented to the [address] [place] [address] on 6/30 after a fall, unable to get up and disoriented. She thinks she tripped and fell down her porch steps, landing on he L thigh. Found to have a L lateral thigh hematoma with evidence of extravasation on imaging. Binder placed over hips, and eliquis was held but not reversed given her recent stroke. Speech consulted and noted oropharyngeal swallow likely at baseline and recommended Level 1/NTL. Recommended for VBS. Course c/b ABLA on 7/2 Hgb dropped to 6.8 and she was given 1 unit pRBC. Geriatrics was consulted, noted at baseline she is oriented but forgetful, with worsening issues since her stroke. Mini-Cog was 9/15 in 2021. They recommended checking B12. They noted she has not been on lorazepam since January, and also recommended weaning down off gabapentin if possible. On 7/4 patient had an unwitnessed fall while trying to reach for her bag on the window sill. No head strike, but landed on her L hip. GCS 15, Neuro exam was baseline. CTA ordered to look for active bleeding, which showed decreased size of hematoma with no active extravasation. Hgb did drop after her transfusion, but seems to have stablized today at 8.5. That being said, her HR was elevated today, and it appears a troponin has been ordered by the trauma team as well as a CXR. CXR showed hazy RLL opacity, likely 2/2 atelectasis, and trops trended down. Metoprolol was increased with improvement. She was admitted to the [facility] on 7/7. Chief Complaint: Overnight was found in the cafeteria on the 1st floor. Interval History/Subjective:  Overnight I was contacted by nursing, as patient was found in the cafeteria kitchen on the 1st floor. Patient states she remembers getting up, but was confused and thought she was somewhere else. She remembers taking the elevator to the 1st floor to look for the kitchen. I reviewed the events with her daughter. She feels back to herself today, but has not had confusion like that in a few days. Reviewing her labs, it appears she does have a UTI. I discussed the events with both her and her daughter and reviewed an action plan, which they both agreed with. She denies any new CP, SOB, fevers, chills, N/V, abdominal pain. She has been waving to the virtual monitor. Last BM 7/9 and incontinent. ROS:  A 10 point review of systems was negative except for what is noted in the HPI. Today's Changes:  1. PVRS TODAY. 2. UTI with 100k enterococcus - starting macrobid based on antibiogram, but discussed with patient that we may have to give her IV abx depending on her sensitivities. 3. Labs ordered in the AM to monitor her K.   4. Virtual monitor for today (may be able to de-escalate to just at night)   - Low bed   - Floor mats   - Bed/Chair alarms   - Discussed with daughter, Radha Hassan. Agrees with plan. 5. Has not been able to get her prazosin and some of her BP meds due to low BP. Monitor and adjust as per IM    Total time spent:  50 minutes, with more than 50% spent counseling/coordinating care. Counseling includes discussion with patient re: progress in therapies, functional issues observed by therapy staff, and discussion with patient his/her current medical state/wellbeing. Coordination of patient's care was performed in conjunction with Internal Medicine service to monitor patient's labs, vitals, and management of their comorbidities.      Assessment/Plan:    * Leg hematoma, left, initial encounter  Assessment & Plan  2/2 fall    6/30 CT CAP:  Large hematoma in the lateral left thigh measuring up to 9.6 cm with focus of active contrast extravasation. No acute intrathoracic or intra-abdominal injury. 7/4 CTA Pelvis:  1. Decreasing size of left lateral thigh hematoma without evidence for active extravasation. 2.  No CT findings in the visualized pelvis and thighs to explain falling hematocrit. 3.  Pancolitis with increasing inflammatory free fluid in the pelvis. No free air, pneumatosis, or encapsulated collections demonstrated. Non-operatively managed. Did require transfusion x1  Pain management as below  Monitor Hgb. PT/OT 3-5 hours/day, 5-7 days/week  Outpatient f/u with General Surgery      Sundowning  Assessment & Plan  1 episode happened overnight into 7/9  - More confusion, than agitation  - Wandered all the way down to the cafeteria on the 1st floor  - No injuries  - Brought back to her room  - UCx with 100k enterococcus. - treat UTI  - Low bed, Virtual sitter - may be able to de-escalate to overnight only. - Floor mats. - Delirium precautions    Night terrors  Assessment & Plan  Home: Prazosin  Here: Prazosin  Continue melatonin  Outpatient f/u with PCP    Thrush  Assessment & Plan  Noted on admission  7/7 started on nystatin swish/spit. Hypomagnesemia  Assessment & Plan  Home: Mag oxide 800mg BID  Here: Mag oxide 400mg BID  7/9 Mag 1.9   Goals > 2. Sinus tachycardia  Assessment & Plan  Home: Toprol XL 25mg BID  Here: Same  On 7/6 with HR in 115-120. EKG showed sinus tach. Trops trended and negative. - Associated with transient/mild SOB   - She was only being given Toprol daily in the hospital   - Increased to BID with improvement  Lytes with K > 4, Mag > 2  Monitor and adjust regimen as appropriate  IM consulted to assist with management  Outpatient f/u with Dr. Kahlil Waterman    Dysphagia  Assessment & Plan  2/2 previous CVA earlier this year.   Noted to have impaired oropharyngeal swallow  Was on free water protocol at home - holding for now due to thrush  On Level 1/NTL  - Inpatient SLP recommended VBS  - Consulting SLP here  - Aspiration precautions  - Strict oral hygiene prior to trials    Urinary tract infection without hematuria  Assessment & Plan  Present on admission  With incontinence and confusion  Grew 100k CFU Enterococcus    - Started on Macrobid based on antibiogram on 7/9   - Awaiting sensitivities    History of stroke  Assessment & Plan  MRI brain 2/22 - 1.  New small acute /more recent interval lacunar infarction in the right cerebral peduncle (series 4, image 14). 2.  Extensive multifocal large, previously present right middle cerebral artery infarction which is evolving with superimposed areas of evolving hemorrhage likely related to hemorrhagic transformation of subacute right MCA infarction.  No significant   mass effect. 3.  Mild, chronic microangiopathy. S/P thrombectomy 2/12 by Dr Dona Kauffman   Secondary stroke prophylaxis at this time with Eliquis 5 mg twice daily   PT, OT, SLP. Review modifiable risk factors and provide stroke education  Monitor for poststroke pain  Eliquis for secondary stroke prophylaxis    Outpatient f/u with Neurology    Obstructive sleep apnea  Assessment & Plan  Previously non-compliant with hospital or home CPAP  Outpatient f/u with PCP. Hypercalcemia  Assessment & Plan  Suspect 2/2 primary hyperparathyroidism  7/8 iCal 1.12, Phos 3.3  Home: Sensipar 30mg daily  Here: Same  Recommend outpatient f/u with Endocrinology      HTN (hypertension)  Assessment & Plan  Tends to run on the low/normal side. Home: Toprol XL 25mg BID, Spironolactone 25mg daily  Here: Same  Has not been able to get meds at times due to not meeting parameters.    Monitor and adjust as appropriate  IM consulted to assist with management    Bipolar depression ([address])  Assessment & Plan  Home: Bupropion 100mg BID, Cymbalta 60mg BID, Ativan 0.25mg BID PRN (however this has not been filled since January 2023 as per PDMP)  Here: Bupropion 100mg BID, Cymbalta 60mg BID  Consulting rehab psychology for support  Adjust as appropriate  Outpatient f/u with PCP    Severe protein-calorie malnutrition ([address])  Assessment & Plan  Malnutrition Findings:                                 BMI Findings: Body mass index is 20.89 kg/m². Consulted nutrition    Osteoporosis  Assessment & Plan  Hx L1 compression fx s/p kyphoplasty  Was previously on Prolia - last given in 2020   - Held due to dental issues  Recommend outpatient f/u with Endocrinology  2/23 Vitamin D level is 41.7    Anemia  Assessment & Plan  2/2 L leg hematoma (most recent imaging negative for extravasation)  At home on eliquis  No reversal inpatient due to recent CVA  Prior to admission Hgb 10-12. Ravi 7/2 6.8 - given 1 unit pRBC  7/8 Hgb 8.5 > 9.2  Monitor, transfuse as appropriate. IM consulted to assist with management    CHF (congestive heart failure) ([address])  Assessment & Plan  Wt Readings from Last 3 Encounters:   07/07/23 55.8 kg (123 lb 0.3 oz)   06/30/23 55.3 kg (122 lb)   06/06/23 47.9 kg (105 lb 9.6 oz)     2/2023 Echo 30% with G2DD, moderate AI  Home: Toprol XL 25mg BID, Aldactone 25mg daily   - Limited by BP - no ACE/ARB   - no SGLT2i due to recurrent UTI  Here: Toprol XL 25mg BID, spironolactone 25mg daily   Unclear etiology of cardiomyopathy  Possible consideration for ischemic work-up outpatient, poor candidate for aggressive procedures   Monitor I/O/daily weights, volume status, lytes. IM consulted to assist with management  Outpatient f/u with Dr. Allison Trujillo      Mixed stress and urge urinary incontinence  Assessment & Plan  Frankly incontinent here  Initiate timed voids and scans  Has history of recurrent UTIs - will check UA on admission here given extent of incontinence   - Had confusion on 7/8-7/9 overnight. - See UTI for details.    Close continence care      Gastroesophageal reflux disease without esophagitis  Assessment & Plan  Home: PPI daily  Here: PPI daily    Seronegative arthropathy of multiple sites [facility])  Assessment & Plan  Follows with Dr. Korey Nichols with Rheum and is on Hydroxychloroquine 200mg BID. Continue home regimen and follow-up with rheumatology on discharge    Mild intermittent asthma without complication  Assessment & Plan  No acute exacerbation  Home: Albuterol PRN  Here: Same    Chronic pain syndrome  Assessment & Plan  Joint and back pain  Follows with Dr. Clay Duran with [facility]  Reviewed PDMP - at home on MSIR 7.5mg BID PRN. Here: Oxycodone 2.5-5mg PRN. - Possible transition back to home meds  - Outpatient f/u with Dr. Clay Duran. Health Maintenance  #Delirium/Sleep: At high risk. See cognitive impairment above. #Pain: Tylenol scheduled 650mg Q6hr, Gabapentin 400mg BID (will try to wean down here), Cymbalta 60mg BID, Oxycodone 2.5-5mg PRN  #Bowel: Last BM 7/7. PRN bisacodyl suppository/miralax  #Bladder: Voiding, incontinent. Timed voids Q4hr with PVR x3. Will also check UA on admission. #Skin/Pressure Injury Prevention: Turn Q2hr in bed, with weight shifts [id number] in wheelchair. Float heels in bed. #DVT Prophylaxis: Fully anticoagulated on eliquis 5mg BID, SCDs. #GI Prophylaxis: Protonix already for chronic issues. #Code Status: Full Code  #FEN: Level 1/NTL, Ensure HP Pudding- Arvel Session at Breakfast/Lunch/Dinner  #Dispo: ELOS 10-14 days with goals to discharge home at Sup-mod Ind level of function      Objective:    Functional Update:   PT: Marika -CGA for bed mobility, transfers, and ambulation 48' with RW  OT: modA oral hygiene, modA Ub dressing, modA LB dressing, Marika footwear, Marika toileting hygiene  SLP: mild-mod oropharyngeal dysphagia. Recommended for Pureed/NTL.      Allergies per EMR    Physical Exam:  Temp:  [97.4 °F (36.3 °C)-97.9 °F (36.6 °C)] 97.9 °F (36.6 °C)  HR:  [84-92] 92  Resp:  [18-19] 18  BP: ()/(55-61) 111/60  Oxygen Therapy  SpO2: 95 %    Gen: No acute distress, frail. HEENT: Moist mucus membranes, Normocephalic/Atraumatic  Cardiovascular: Regular rate, rhythm, S1/S2. Distal pulses palpable  Heme/Extr: No edema  Pulmonary: Non-labored breathing. Lungs CTAB  : No lopes  GI: Soft, non-tender, non-distended. BS+  Integumentary: Skin is warm, dry. Neuro: AAOx3, Speech is intact. Appropriate to questioning. Appropriate insight at this time. Psych: Normal mood and affect.      Diagnostic Studies: Reviewed, no new imaging    Laboratory:  Reviewed  Results from last 7 days   Lab Units 07/08/23  0537 07/06/23  0534 07/05/23  0552   HEMOGLOBIN g/dL 9.2* 8.5* 7.4*   HEMATOCRIT % 29.2* 26.4* 22.5*   WBC Thousand/uL 6.56 5.31 4.34     Results from last 7 days   Lab Units 07/08/23  0537 07/05/23  0552 07/04/23  0458   BUN mg/dL 15 7 7   POTASSIUM mmol/L 3.2* 3.6 3.7   CHLORIDE mmol/L 114* 114* 116*   CREATININE mg/dL 0.88 0.72 0.71   AST U/L 15  --   --    ALT U/L 21  --   --             Patient Active Problem List   Diagnosis   • Chronic pain syndrome   • Mild intermittent asthma without complication   • Ambulatory dysfunction   • Seronegative arthropathy of multiple sites (HCC)   • Gastroesophageal reflux disease without esophagitis   • Mixed stress and urge urinary incontinence   • CHF (congestive heart failure) ([facility])   • Anemia   • Undifferentiated connective tissue disease ([address])   • Primary generalized (osteo)arthritis   • Peripheral neuropathy   • Osteoporosis   • Hx of compression fracture of spine   • Lumbar spondylosis   • Cervical spondylosis   • Closed fracture of multiple ribs of right side   • Traumatic pneumothorax   • Closed fracture of transverse process of lumbar vertebra [facility])   • Liver contusion   • Fall   • Acute pain due to trauma   • Right shoulder pain   • Severe protein-calorie malnutrition (HCC)   • Bipolar depression (HCC)   • Rheumatoid arthritis of multiple sites with negative rheumatoid factor ([address])   • 'light-for-dates' infant with signs of fetal malnutrition   • LAILA (acute kidney injury) ([address])   • Polypharmacy   • Cardiomyopathy ([address])   • Vitamin D insufficiency   • Stroke ([address])   • HTN (hypertension)   • HFrEF (heart failure with reduced ejection fraction) ([facility])   • Hypercalcemia   • Discoloration of skin of foot   • Abdominal pain   • Acute CVA (cerebrovascular accident) ([address])   • History of bacteremia   • Obstructive sleep apnea   • History of stroke   • AMS (altered mental status)   • Gout of foot   • Dysphagia   • Wound of left ankle   • Hypokalemia   • Sinus tachycardia   • Hypomagnesemia   • Insomnia   • Nocturnal hypoxia   • Stroke-like symptoms   • Cerebral aneurysm, nonruptured   • Leg hematoma, left, initial encounter   • Thrush   • Night terrors         Medications  Current Facility-Administered Medications   Medication Dose Route Frequency Provider Last Rate   • acetaminophen  650 mg Oral Q6H Dick Mcallister MD     • albuterol  2 puff Inhalation Q6H PRN Eve Lanier MD     • apixaban  5 mg Oral BID Eve Lanier MD     • buPROPion  100 mg Oral BID Eve Lanier MD     • cinacalcet  30 mg Oral Daily With Breakfast Eve Lanier MD     • DULoxetine  60 mg Oral BID Eve Lanier MD     • gabapentin  400 mg Oral BID Eve Lanier MD     • hydroxychloroquine  200 mg Oral BID With Meals Eve Lanier MD     • lamoTRIgine  150 mg Oral HS Eve Lanier MD     • magnesium Oxide  400 mg Oral BID Eve Lanier MD     • melatonin  3 mg Oral HS Eve Lanier MD     • metoprolol succinate  25 mg Oral BID LAURA Santiago     • nystatin  500,000 Units Swish & Spit 4x Daily Eve Lanier MD     • ondansetron  4 mg Oral Q6H PRN Eve Lanier MD     • oxyCODONE  5 mg Oral Q4H PRN Eve Lanier MD     • oxyCODONE  2.5 mg Oral Q4H PRN Eve Lanier MD     • pantoprazole  40 mg Oral Early Morning Eve Lanier MD     • prazosin  1 mg Oral HS LAURA Santiago     • spironolactone  25 mg Oral Daily Tresa Gruber MD            ** Please Note: Fluency Direct voice to text software may have been used in the creation of this document.  **

## 2023-07-09 NOTE — PLAN OF CARE
Problem: PAIN - ADULT  Goal: Verbalizes/displays adequate comfort level or baseline comfort level  Description: Interventions:  - Encourage patient to monitor pain and request assistance  - Assess pain using appropriate pain scale  - Administer analgesics based on type and severity of pain and evaluate response  - Implement non-pharmacological measures as appropriate and evaluate response  - Consider cultural and social influences on pain and pain management  - Notify physician/advanced practitioner if interventions unsuccessful or patient reports new pain  Outcome: Progressing     Problem: INFECTION - ADULT  Goal: Absence or prevention of progression during hospitalization  Description: INTERVENTIONS:  - Assess and monitor for signs and symptoms of infection  - Monitor lab/diagnostic results  - Monitor all insertion sites, i.e. indwelling lines, tubes, and drains  - Monitor endotracheal if appropriate and nasal secretions for changes in amount and color  - Snyder appropriate cooling/warming therapies per order  - Administer medications as ordered  - Instruct and encourage patient and family to use good hand hygiene technique  - Identify and instruct in appropriate isolation precautions for identified infection/condition  Outcome: Progressing  Goal: Absence of fever/infection during neutropenic period  Description: INTERVENTIONS:  - Monitor WBC    Outcome: Progressing     Problem: SAFETY ADULT  Goal: Patient will remain free of falls  Description: INTERVENTIONS:  - Educate patient/family on patient safety including physical limitations  - Instruct patient to call for assistance with activity   - Consult OT/PT to assist with strengthening/mobility   - Keep Call bell within reach  - Keep bed low and locked with side rails adjusted as appropriate  - Keep care items and personal belongings within reach  - Initiate and maintain comfort rounds  - Make Fall Risk Sign visible to staff  - Offer Suzy Polanco, in advance of need  - Initiate/Nagy  - Obtain necessary fall risk management  - Apply yellow socks and bracelet for high fall risk patients  - Consider moving patient to room near nurses station  Outcome: Progressing  Goal: Maintain or return to baseline ADL function  Description: INTERVENTIONS:  -  Assess patient's ability to carry out ADLs; assess patient's baseline for ADL function and identify physical deficits which impact ability to perform ADLs (bathing, care of mouth/teeth, toileting, grooming, dressing, etc.)  - Assess/evaluate cause of self-care deficits   - Assess range of motion  - Assess patient's mobility; develop plan if impaired  - Assess patient's need for assistive devices and provide as appropriate  - Encourage maximum independence but intervene and supervise when necessary  - Involve family in performance of ADLs  - Assess for home care needs following discharge   - Consider OT consult to assist with ADL evaluation and planning for discharge  - Provide patient education as appropriate  Outcome: Progressing  Goal: Maintains/Returns to pre admission functional level  Description: INTERVENTIONS:  - Perform BMAT or MOVE assessment daily.   - Set and communicate daily mobility goal to care team and patient/family/caregiver. - Collaborate with rehabilitation services on mobility goals if consulted  - Perform Range os a day. - Reposition pars.   - Dangle luis  - Stand pa  - Ambulate pat  - Out of bed to c  - Out of be  - Out of bed for toileting  - Record patient progress and toleration of activity level   Outcome: Progressing     Problem: DISCHARGE PLANNING  Goal: Discharge to home or other facility with appropriate resources  Description: INTERVENTIONS:  - Identify barriers to discharge w/patient and caregiver  - Arrange for needed discharge resources and transportation as appropriate  - Identify discharge learning needs (meds, wound care, etc.)  - Arrange for interpretive services to assist at discharge as needed  - Refer to Case Management Department for coordinating discharge planning if the patient needs post-hospital services based on physician/advanced practitioner order or complex needs related to functional status, cognitive ability, or social support system  Outcome: Progressing     Problem: Prexisting or High Potential for Compromised Skin Integrity  Goal: Skin integrity is maintained or improved  Description: INTERVENTIONS:  - Identify patients at risk for skin breakdown  - Assess and monitor skin integrity  - Assess and monitor nutrition and hydration status  - Monitor labs   - Assess for incontinence   - Turn and reposition patient  - Assist with mobility/ambulation  - Relieve pressure over bony prominences  - Avoid friction and shearing  - Provide appropriate hygiene as needed including keeping skin clean and dry  - Evaluate need for skin moisturizer/barrier cream  - Collaborate with interdisciplinary team   - Patient/family teaching  - Consider wound care consult   Outcome: Progressing     Problem: Nutrition/Hydration-ADULT  Goal: Nutrient/Hydration intake appropriate for improving, restoring or maintaining nutritional needs  Description: Monitor and assess patient's nutrition/hydration status for malnutrition. Collaborate with interdisciplinary team and initiate plan and interventions as ordered. Monitor patient's weight and dietary intake as ordered or per policy. Utilize nutrition screening tool and intervene as necessary. Determine patient's food preferences and provide high-protein, high-caloric foods as appropriate.      INTERVENTIONS:  - Monitor oral intake, urinary output, labs, and treatment plans  - Assess nutrition and hydration status and recommend course of action  - Evaluate amount of meals eaten  - Assist patient with eating if necessary   - Allow adequate time for meals  - Recommend/ encourage appropriate diets, oral nutritional supplements, and vitamin/mineral supplements  - Order, calculate, and assess calorie counts as needed  - Recommend, monitor, and adjust tube feedings and TPN/PPN based on assessed needs  - Assess need for intravenous fluids  - Provide specific nutrition/hydration education as appropriate  - Include patient/family/caregiver in decisions related to nutrition  Outcome: Progressing

## 2023-07-09 NOTE — ASSESSMENT & PLAN NOTE
Present on admission  With incontinence and confusion  Grew 100k CFU Enterococcus    - Started on Macrobid based on antibiogram on 7/9   - sensitivity confirms susceptibility   - improved symptoms of UTI  Unfortunately confusion and repeat UTI symptoms on 7/20  - 7/20 reports burning with urination, awaiting UA w/reflex, WBC 10-20   - 7/21 - urine culture + > 100K Enterococcus faecalis, started Levaquin  - Would treat for 7 days.  - Urology consulted - no further recs. May be a candidate for prophylactic antibiotics  - Does follow with Urogynecology at [facility] - last seen in 2020.  - Will need re-referral at discharge.

## 2023-07-10 LAB
ANION GAP SERPL CALCULATED.3IONS-SCNC: 5 MMOL/L
BACTERIA UR CULT: ABNORMAL
BACTERIA UR CULT: ABNORMAL
BASOPHILS # BLD AUTO: 0.04 THOUSANDS/ÂΜL (ref 0–0.1)
BASOPHILS NFR BLD AUTO: 1 % (ref 0–1)
BUN SERPL-MCNC: 18 MG/DL (ref 5–25)
CALCIUM SERPL-MCNC: 8.1 MG/DL (ref 8.3–10.1)
CHLORIDE SERPL-SCNC: 112 MMOL/L (ref 96–108)
CO2 SERPL-SCNC: 26 MMOL/L (ref 21–32)
CREAT SERPL-MCNC: 0.91 MG/DL (ref 0.6–1.3)
EOSINOPHIL # BLD AUTO: 0 THOUSAND/ÂΜL (ref 0–0.61)
EOSINOPHIL NFR BLD AUTO: 0 % (ref 0–6)
ERYTHROCYTE [DISTWIDTH] IN BLOOD BY AUTOMATED COUNT: 19.1 % (ref 11.6–15.1)
GFR SERPL CREATININE-BSD FRML MDRD: 64 ML/MIN/1.73SQ M
GLUCOSE P FAST SERPL-MCNC: 81 MG/DL (ref 65–99)
GLUCOSE SERPL-MCNC: 81 MG/DL (ref 65–140)
HCT VFR BLD AUTO: 29.7 % (ref 34.8–46.1)
HGB BLD-MCNC: 9.3 G/DL (ref 11.5–15.4)
IMM GRANULOCYTES # BLD AUTO: 0.04 THOUSAND/UL (ref 0–0.2)
IMM GRANULOCYTES NFR BLD AUTO: 1 % (ref 0–2)
LYMPHOCYTES # BLD AUTO: 1.97 THOUSANDS/ÂΜL (ref 0.6–4.47)
LYMPHOCYTES NFR BLD AUTO: 26 % (ref 14–44)
MCH RBC QN AUTO: 30.7 PG (ref 26.8–34.3)
MCHC RBC AUTO-ENTMCNC: 31.3 G/DL (ref 31.4–37.4)
MCV RBC AUTO: 98 FL (ref 82–98)
MONOCYTES # BLD AUTO: 0.63 THOUSAND/ÂΜL (ref 0.17–1.22)
MONOCYTES NFR BLD AUTO: 8 % (ref 4–12)
NEUTROPHILS # BLD AUTO: 4.96 THOUSANDS/ÂΜL (ref 1.85–7.62)
NEUTS SEG NFR BLD AUTO: 64 % (ref 43–75)
NRBC BLD AUTO-RTO: 0 /100 WBCS
PLATELET # BLD AUTO: 330 THOUSANDS/UL (ref 149–390)
PMV BLD AUTO: 10.6 FL (ref 8.9–12.7)
POTASSIUM SERPL-SCNC: 4.1 MMOL/L (ref 3.5–5.3)
RBC # BLD AUTO: 3.03 MILLION/UL (ref 3.81–5.12)
SODIUM SERPL-SCNC: 143 MMOL/L (ref 135–147)
WBC # BLD AUTO: 7.64 THOUSAND/UL (ref 4.31–10.16)

## 2023-07-10 PROCEDURE — 85025 COMPLETE CBC W/AUTO DIFF WBC: CPT | Performed by: NURSE PRACTITIONER

## 2023-07-10 PROCEDURE — 97116 GAIT TRAINING THERAPY: CPT

## 2023-07-10 PROCEDURE — 97530 THERAPEUTIC ACTIVITIES: CPT

## 2023-07-10 PROCEDURE — 99232 SBSQ HOSP IP/OBS MODERATE 35: CPT | Performed by: INTERNAL MEDICINE

## 2023-07-10 PROCEDURE — 99232 SBSQ HOSP IP/OBS MODERATE 35: CPT | Performed by: PHYSICAL MEDICINE & REHABILITATION

## 2023-07-10 PROCEDURE — 97112 NEUROMUSCULAR REEDUCATION: CPT

## 2023-07-10 PROCEDURE — 80048 BASIC METABOLIC PNL TOTAL CA: CPT | Performed by: NURSE PRACTITIONER

## 2023-07-10 RX ORDER — LIDOCAINE 50 MG/G
1 PATCH TOPICAL DAILY
Status: DISCONTINUED | OUTPATIENT
Start: 2023-07-11 | End: 2023-07-21

## 2023-07-10 RX ADMIN — NYSTATIN 500000 UNITS: 100000 SUSPENSION ORAL at 21:07

## 2023-07-10 RX ADMIN — HYDROXYCHLOROQUINE SULFATE 200 MG: 200 TABLET ORAL at 08:37

## 2023-07-10 RX ADMIN — OXYCODONE HYDROCHLORIDE 5 MG: 5 TABLET ORAL at 08:43

## 2023-07-10 RX ADMIN — SPIRONOLACTONE 25 MG: 25 TABLET ORAL at 10:20

## 2023-07-10 RX ADMIN — METOPROLOL SUCCINATE 25 MG: 25 TABLET, FILM COATED, EXTENDED RELEASE ORAL at 10:15

## 2023-07-10 RX ADMIN — GABAPENTIN 400 MG: 400 CAPSULE ORAL at 17:45

## 2023-07-10 RX ADMIN — HYDROXYCHLOROQUINE SULFATE 200 MG: 200 TABLET ORAL at 17:46

## 2023-07-10 RX ADMIN — Medication 2.5 MG: at 14:38

## 2023-07-10 RX ADMIN — PANTOPRAZOLE SODIUM 40 MG: 40 TABLET, DELAYED RELEASE ORAL at 05:02

## 2023-07-10 RX ADMIN — APIXABAN 5 MG: 5 TABLET, FILM COATED ORAL at 17:45

## 2023-07-10 RX ADMIN — ACETAMINOPHEN 650 MG: 325 TABLET, FILM COATED ORAL at 13:59

## 2023-07-10 RX ADMIN — ACETAMINOPHEN 650 MG: 325 TABLET, FILM COATED ORAL at 10:18

## 2023-07-10 RX ADMIN — NYSTATIN 500000 UNITS: 100000 SUSPENSION ORAL at 14:00

## 2023-07-10 RX ADMIN — BUPROPION HYDROCHLORIDE 100 MG: 100 TABLET, FILM COATED ORAL at 10:20

## 2023-07-10 RX ADMIN — SIMETHICONE 80 MG: 80 TABLET, CHEWABLE ORAL at 17:45

## 2023-07-10 RX ADMIN — BUPROPION HYDROCHLORIDE 100 MG: 100 TABLET, FILM COATED ORAL at 17:47

## 2023-07-10 RX ADMIN — MELATONIN TAB 3 MG 3 MG: 3 TAB at 21:07

## 2023-07-10 RX ADMIN — NITROFURANTOIN (MONOHYDRATE/MACROCRYSTALS) 100 MG: 75; 25 CAPSULE ORAL at 17:47

## 2023-07-10 RX ADMIN — NITROFURANTOIN (MONOHYDRATE/MACROCRYSTALS) 100 MG: 75; 25 CAPSULE ORAL at 08:36

## 2023-07-10 RX ADMIN — MAGNESIUM OXIDE TAB 400 MG (241.3 MG ELEMENTAL MG) 400 MG: 400 (241.3 MG) TAB at 10:14

## 2023-07-10 RX ADMIN — CINACALCET 30 MG: 30 TABLET, FILM COATED ORAL at 08:37

## 2023-07-10 RX ADMIN — GABAPENTIN 400 MG: 400 CAPSULE ORAL at 10:15

## 2023-07-10 RX ADMIN — SIMETHICONE 80 MG: 80 TABLET, CHEWABLE ORAL at 21:07

## 2023-07-10 RX ADMIN — MAGNESIUM OXIDE TAB 400 MG (241.3 MG ELEMENTAL MG) 400 MG: 400 (241.3 MG) TAB at 17:45

## 2023-07-10 RX ADMIN — NYSTATIN 500000 UNITS: 100000 SUSPENSION ORAL at 17:45

## 2023-07-10 RX ADMIN — OXYCODONE HYDROCHLORIDE 5 MG: 5 TABLET ORAL at 18:38

## 2023-07-10 RX ADMIN — ACETAMINOPHEN 650 MG: 325 TABLET, FILM COATED ORAL at 02:27

## 2023-07-10 RX ADMIN — APIXABAN 5 MG: 5 TABLET, FILM COATED ORAL at 10:15

## 2023-07-10 RX ADMIN — LAMOTRIGINE 150 MG: 100 TABLET ORAL at 21:07

## 2023-07-10 RX ADMIN — ACETAMINOPHEN 650 MG: 325 TABLET, FILM COATED ORAL at 21:06

## 2023-07-10 RX ADMIN — METOPROLOL SUCCINATE 25 MG: 25 TABLET, FILM COATED, EXTENDED RELEASE ORAL at 21:07

## 2023-07-10 RX ADMIN — NYSTATIN 500000 UNITS: 100000 SUSPENSION ORAL at 10:14

## 2023-07-10 RX ADMIN — DULOXETINE HYDROCHLORIDE 60 MG: 60 CAPSULE, DELAYED RELEASE ORAL at 17:45

## 2023-07-10 RX ADMIN — SIMETHICONE 80 MG: 80 TABLET, CHEWABLE ORAL at 08:36

## 2023-07-10 RX ADMIN — SIMETHICONE 80 MG: 80 TABLET, CHEWABLE ORAL at 12:31

## 2023-07-10 RX ADMIN — DULOXETINE HYDROCHLORIDE 60 MG: 60 CAPSULE, DELAYED RELEASE ORAL at 10:15

## 2023-07-10 NOTE — PROGRESS NOTES
Internal Medicine Progress Note  Patient: Chyna Packer  Age/sex: 70 y.o. female  Medical Record #: [id number]      ASSESSMENT/PLAN: (Interval History)  Chyna Packer is seen and examined and management for following issues:    Left lateral thigh hematoma   • Stable  • Will watch hemoglobin     ABLA  • S/p 1 unit PRBCs 7/2/23     Thrush  • Continue Nystatin     Chronic systolic heart failure   • Originally diagnosed 2004 when LVEF then was 10-15% and felt to be NICM  • Her LVEF did recover into 50's then fell when she had her CVA in 2/2023 to 30%  • Also has moderate AI/gr 2 DD  • Had been seeing Cardiology at [facility] but now sees Dr Radha Ruano  • She declined a LifeVest in 2/2023 when LVEF was found to be 30% which is most current  • Had been on Coreg in past but HF team switched her to Toprol when she had her stroke 2/2023  • Her soft BPs have limited starting her on ACEI/ARB as OP  • Home:  Toprol XL 25mg BID/Aldactone 25mg qd  • Here:  Toprol XL 25 mg BID (inc from qd 7/6/23)/Aldactone 25mg qd  • On 7/7 night, Toprol and Minipress were held; on 7/8 AM Toprol and Aldactone held 2/2 soft BPs so reduced hold parameter on Toprol to <100 and increased hold on Minipress to 120  • Minipress (she takes for nightmares) held evening 7/8/23 and Toprol held AM 7/9/23 for 94/50. Aldactone was held 7/8, 7/9     Hypercalcemia  • Continue Cinacalcet here as was taking at home     HS hypoxia   • using 4L NC HS at home  • Is using O2 at night but amt not documented.   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  • Has residual left sided weakness  • Had recrudescence of CVA 4/2023 with worsening left facial droop  • Continue Eliquis     Dysphagia  • Had admission for aspiration PNA at [facility] 5/2023 tx'd with Levaquin and Flagyl  • Continue pureed diet with NTL as at home  • Continue Ensure HP pudding TID  • ST following     Bipolar depression  • Home:  Wellbutrin 100mg BID/Cymbalta 60mg BID/Lamictal 150mg qhs  • Here:  Wellbutrin 100mg BID/Cymbalta 60mg BID/Lamictal 150mg qhs     Rheumatoid arthritis  • Continue Plaquenil 200mg BID as at home     Chronic pain/chronic opioid use  • 2/2 RA and lumbar spondylosis  • Pain management per PMR     Hx incidental right superior hypophyseal artery aneurysm  • Follows with NS Dr Janae Maldonado     CRE urine 3/2023  • Maintain contact isolation     Dye allergy   • Was dye prepped on 7/4/23 with Benadryl 50mg IV x 1 w/o issues     Incidental findings  • Pancolitis with increasing inflammatory free fluid in pelvis  • Calcified granuloma RLL     Hypokalemia  • Got Kdur 40 meq x 2 on 7/8/23  • K+ is 4.1 today 7/10/23     UTI  • Urine cx sent by Dr Kilo Henriquez and currently is >100,000 of Enterococcus faecalis. She placed on Macrobid = sensitive        Discharge date:  Team       The above assessment and plan was reviewed and updated as determined by my evaluation of the patient on 7/10/2023.     Labs:   Results from last 7 days   Lab Units 07/10/23  0501 07/08/23  0537   WBC Thousand/uL 7.64 6.56   HEMOGLOBIN g/dL 9.3* 9.2*   HEMATOCRIT % 29.7* 29.2*   PLATELETS Thousands/uL 330 279     Results from last 7 days   Lab Units 07/10/23  0501 07/08/23  0537   SODIUM mmol/L 143 144   POTASSIUM mmol/L 4.1 3.2*   CHLORIDE mmol/L 112* 114*   CO2 mmol/L 26 25   BUN mg/dL 18 15   CREATININE mg/dL 0.91 0.88   CALCIUM mg/dL 8.1* 8.3                   Review of Scheduled Meds:  Current Facility-Administered Medications   Medication Dose Route Frequency Provider Last Rate   • acetaminophen  650 mg Oral Q6H [facility] Sophia Robledo MD     • albuterol  2 puff Inhalation Q6H PRN Sophia Robledo MD     • apixaban  5 mg Oral BID Sophia Robldeo MD     • buPROPion  100 mg Oral BID Sophia Robledo MD     • cinacalcet  30 mg Oral Daily With Breakfast Nishi Rojas MD     • Diclofenac Sodium  2 g Topical 4x Daily PRN Nishi Rojas MD     • DULoxetine  60 mg Oral BID Nishi Rojas MD     • gabapentin  400 mg Oral BID Nishi Rojas MD     • hydroxychloroquine  200 mg Oral BID With Meals Nishi Rojas MD     • lamoTRIgine  150 mg Oral HS Nishi Rojsa MD     • magnesium Oxide  400 mg Oral BID Nishi Rojas MD     • melatonin  3 mg Oral HS Nishi Rojas MD     • metoprolol succinate  25 mg Oral BID LAURA Nguyen     • nitrofurantoin  100 mg Oral BID With Meals Nishi Rojas MD     • nystatin  500,000 Units Swish & Spit 4x Daily Nishi Rojas MD     • ondansetron  4 mg Oral Q6H PRN Nishi Rojas MD     • oxyCODONE  5 mg Oral Q4H PRN Nishi Rojas MD     • oxyCODONE  2.5 mg Oral Q4H PRN Nishi Rojas MD     • pantoprazole  40 mg Oral Early Morning Nishi Rojas MD     • simethicone  80 mg Oral 4x Daily (with meals and at bedtime) LAURA Nguyen     • spironolactone  25 mg Oral Daily Nishi Rojas MD         Subjective/ HPI: Patient seen and examined. Patients overnight issues or events were reviewed with nursing or staff during rounds or morning huddle session. New or overnight issues include the following:     No new or overnight issues. Offers no complaints    ROS:   A 10 point ROS was performed; negative except as noted above.        Imaging:     No orders to display       *Labs /Radiology studies reviewed  *Medications reviewed and reconciled as needed  *Please refer to order section for additional ordered labs studies  *Case discussed with primary attending during morning huddle case rounds    Physical Examination:  Vitals:   Vitals:    07/09/23 2018 07/10/23 0500 07/10/23 0600 07/10/23 1015   BP: 100/58 100/60  120/60   BP Location: Left arm Left arm     Pulse: 90 84  72   Resp: 18 20     Temp: 97.9 °F (36.6 °C) (!) 96.8 °F (36 °C)     TempSrc: Axillary Axillary     SpO2: 98% 97%     Weight: 54.6 kg (120 lb 6.4 oz)    Height:           General Appearance: no distress, conversive  HEENT: PERRLA, conjuctiva normal; oropharynx clear; mucous membranes moist   Neck:  Supple, normal ROM  Lungs: CTA, normal respiratory effort, no retractions, expiratory effort normal  CV: regular rate and rhythm; no rubs/murmurs/gallops, PMI normal   ABD: soft; ND/NT; +BS  EXT: no edema  Skin: normal turgor, normal texture, no rashes  Psych: affect normal, mood normal  Neuro: AA      The above physical exam was reviewed and updated as determined by my evaluation of the patient on 7/10/2023. Invasive Devices     Peripheral Intravenous Line  Duration           Long-Dwell Peripheral IV (Midline) 40/31/58 Right Basilic 6 days                   VTE Pharmacologic Prophylaxis: Eliquis  Code Status: Level 1 - Full Code  Current Length of Stay: 3 day(s)      Total time spent:  30 minutes with more than 50% spent counseling/coordinating care. Counseling includes discussion with patient re: progress  and discussion with patient of his/her current medical state/information. Coordination of patient's care was performed in conjunction with primary service. Time invested included review of patient's labs, vitals, and management of their comorbidities with continued monitoring. In addition, this patient was discussed with medical team including physician and advanced extenders. The care of the patient was extensively discussed and appropriate treatment plan was formulated unique for this patient. Medical decision making for the day was made by supervising physician unless otherwise noted in their attestation statement. ** Please Note:  voice to text software may have been used in the creation of this document.  Although proof errors in transcription or interpretation are a potential of such software**

## 2023-07-10 NOTE — CASE MANAGEMENT
Met w/pt to introduce self and cm role. Pt asked cm if contact had been made with her daughter yet. Cm phoned daughter Pura Mason after meeting with pt and left a mssg on her voice mail. Per chart info pt resides with son and his girlfriend in a two story home with 1 lashawn. Pt has a first flr set up and has experience with [facility] and [facility] for inpat services. Pt has recently had hhc through [facility]. Pt owns a walker an [facility] in [address] [place] [address] for rx services. Awaiting call back from pts daughter.

## 2023-07-10 NOTE — PROGRESS NOTES
OT Treatment Session     07/10/23 0900   Pain Assessment   Pain Assessment Tool 0-10   Pain Score 5   Restrictions/Precautions   Precautions 1:1;Aspiration;Bed/chair alarms;Cognitive; Fall Risk;Pain;Supervision on toilet/commode;Visual deficit   Lifestyle   Autonomy "My son's girlfriend helps me with whatever I need"   Picking Up Object   Type of Assistance Needed Incidental touching   Physical Assistance Level No physical assistance   Comment with reacher and RW   Picking Up Object CARE Score 4   Sit to Stand   Type of Assistance Needed Incidental touching   Physical Assistance Level No physical assistance   Comment CGA sit/stand/sit txfs from wc with RW   Sit to Stand CARE Score 4   Kitchen Mobility   Kitchen-Mobility Level Walker   Kitchen Activity Retrieve items;Transport items   Kitchen Mobility Comments Pt engaged in kitchen mob, retrieving cup. OT educated on use of RW tray, positioning RW in kitchen for safety and safely reaching with RW. Pt retrieved cup from overhead cabinet, required VC to avoid carrying items in hands while utilizing RW. Educated pt on counter slide method for transporting items without RW accessories. Pt required VCs to successfully complete. Introduced [employer] accessories including RW folding tray, standard tray and walker basket. Pt completed kitchen mob, utilizing RW with folding walker tray, wheelchair positioned within quick reach for safety. Pt required VC to position RW inside fridge to limit reach outside AYLA. Pt demonstrated good carryover of education while using tray to transport cup. Pt tolerated well. Pt required brief seated rest breaks to manage fatigue. Pending progress, pt will likely require A for kitchen tasks to increase safety in the home.    Cognition   Overall Cognitive Status Impaired   Arousal/Participation Alert;Arousable   Attention Attends with cues to redirect   Orientation Level Oriented X4   Memory Decreased short term memory;Decreased recall of recent events;Decreased recall of precautions   Following Commands Follows one step commands with increased time or repetition   Additional Activities   Additional Activities Other (Comment)   Additional Activities Comments Pt engaged in object retrieval activity collecting 12 bean bags from floor with LHR and placing in RW basket. CGA provided throughout for inc safety, no LOB, occasional VCs required for L hand placement and to avoid carrying reacher while mobilizing with RW. Pt demonstrated good RW placement, positioning close to items. Pt retrieved 12/12 bags, encouraged to visually scan environment to locate to promote L attention. Activity Tolerance   Activity Tolerance Patient tolerated treatment well   Assessment   Treatment Assessment Pt participated in 30 minute skilled OT session focusing on fxnl xfers, standing tolerance, standing balance, visual scanning and DME training/education. Pt tolerated session well, required rest breaks to manage fatigue. Pt engaged in kitchen mobility and object retrieval with LHR and RW. Please see above for details on functional performance and education. Pt demonstrated improved activity tolerance and decreased assist required for transfers. Pt cont to be limited by decreased functional strength, balance, endurance/activity tolerance, ROM, coordination, pain, safety awareness, judgement, cognition, vision, and mobility, resulting in decreased safety and independence with I/ADLs and functional mobility. Pt will benefit from continued skilled OT to maximize independence and safety with ADLs and functional transfers. Continue POC with focus on: ADL re-training, functional transfers, UE strength/endurance/ROM, activity tolerance, functional cognition, ECT, and compensation techniques. Pt left with PT Dorota at end of session. Problem List Decreased strength;Decreased endurance; Impaired balance;Decreased mobility;Pain;Decreased skin integrity; Decreased coordination;Decreased cognition; Impaired judgement;Decreased safety awareness; Impaired vision;Decreased range of motion   Barriers to Discharge Inaccessible home environment;Decreased caregiver support   Plan   Treatment/Interventions ADL retraining;LE strengthening/ROM; Therapeutic exercise; Endurance training;Cognitive reorientation;Patient/family training;Equipment eval/education; Compensatory technique education   Progress Progressing toward goals   OT Therapy Minutes   OT Time In 0900   OT Time Out 0930   OT Total Time (minutes) 30   OT Mode of treatment - Individual (minutes) 30   OT Mode of treatment - Concurrent (minutes) 0   OT Mode of treatment - Group (minutes) 0   OT Mode of treatment - Co-treat (minutes) 0   OT Mode of Treatment - Total time(minutes) 30 minutes   OT Cumulative Minutes 120   Therapy Time missed   Time missed?  No

## 2023-07-10 NOTE — PROGRESS NOTES
07/10/23 0830   Pain Assessment   Pain Assessment Tool 0-10   Pain Score 8   Pain Location/Orientation Orientation: Left; Location: Hip   Restrictions/Precautions   Precautions 1:1;Aspiration;Bed/chair alarms;Cognitive; Fall Risk;Contact/isolation;Pain;Supervision on toilet/commode;Visual deficit   Cognition   Overall Cognitive Status Impaired   Arousal/Participation Alert;Arousable   Attention Attends with cues to redirect   Orientation Level Oriented X4   Memory Decreased short term memory;Decreased recall of recent events;Decreased recall of precautions   Following Commands Follows one step commands with increased time or repetition   Roll Left and Right   Type of Assistance Needed Supervision   Roll Left and Right CARE Score 4   Sit to Lying   Type of Assistance Needed Physical assistance   Physical Assistance Level 25% or less   Comment Fei for management of LLE   Sit to Lying CARE Score 3   Lying to Sitting on Side of Bed   Type of Assistance Needed Physical assistance   Physical Assistance Level 25% or less   Comment Fei for management of LLE   Lying to Sitting on Side of Bed CARE Score 3   Sit to Stand   Type of Assistance Needed Physical assistance   Physical Assistance Level 25% or less   Comment Fei with RW   Sit to Stand CARE Score 3   Bed-Chair Transfer   Type of Assistance Needed Physical assistance   Physical Assistance Level 25% or less   Comment EOB<>WC with RW, Fei, VC for placement of LUE, sometimes requires hand over hand assistance to place LUE onto RW. requires steadying upon standing   Chair/Bed-to-Chair Transfer CARE Score 3   Transfer Bed/Chair/Wheelchair   Limitations Noted In Balance; Coordination;Pain Management;Problem Solving; Sequencing;UE Strength;LE Strength;Vision   Ambulation   Primary Mode of Locomotion Prior to Admission Walk   Distance Walked (feet) 110 ft   Assist Device Roller Walker   Gait Pattern Slow Amy;Decreased foot clearance; Forward Flexion;Narrow AYLA;Shuffle;Improper weight shift   Limitations Noted In Balance;Device Management; Endurance; Heel Strike;Posture; Safety;Speed;Strength;Swing   Provided Assistance with: Balance;Weight Shift   Walk Assist Level Minimum Assist   Findings Fei with RW, VC to stay in RW frame when turning   Does the patient walk? 2. Yes   Therapeutic Interventions   Strengthening Sit to Stand for LE strengthening x6   Balance Unsupported Standing reaching outside AYLA x16, CGA for safety   Assessment   Treatment Assessment Patient participated in brief 30 minute skilled physial therapy session focusing on bed mobility, transfers, and ambulation with RW. Patient requires CGA to Fei for steadying upon standing. Requires VC and hand over hand assistance for placement of LUE. Patient is on chair and bed alarams as well as virtual 1:1. Patient is high risk for falls as she is easily distracted, poor righting reactions, decreased safety awareness and demonstrates poor insight into deficits. Have not heard back from daughter regarding home set up. Will need to follow up   Problem List Decreased strength;Decreased endurance; Impaired balance;Decreased mobility;Pain;Decreased skin integrity   Barriers to Discharge Inaccessible home environment;Decreased caregiver support   Plan   Treatment/Interventions ADL retraining;Functional transfer training;LE strengthening/ROM; Therapeutic exercise; Endurance training;Patient/family training;Equipment eval/education; Bed mobility;Gait training   Progress Progressing toward goals   Recommendation   PT Discharge Recommendation Home with home health rehabilitation   PT Therapy Minutes   PT Time In 0830   PT Time Out 0900   PT Total Time (minutes) 30   PT Mode of treatment - Individual (minutes) 30   PT Mode of treatment - Concurrent (minutes) 0   PT Mode of treatment - Group (minutes) 0   PT Mode of treatment - Co-treat (minutes) 0   PT Mode of Treatment - Total time(minutes) 30 minutes   PT Cumulative Minutes [id number], PT, DPT

## 2023-07-10 NOTE — PROGRESS NOTES
07/10/23 0930   Pain Assessment   Pain Assessment Tool 0-10   Pain Score 6   Pain Location/Orientation Orientation: Left; Location: Hip   Restrictions/Precautions   Precautions 1:1;Aspiration;Bed/chair alarms;Cognitive; Fall Risk;Impulsive;Contact/isolation;Pain;Supervision on toilet/commode;Visual deficit   Cognition   Overall Cognitive Status Impaired   Arousal/Participation Alert;Arousable   Attention Attends with cues to redirect   Orientation Level Oriented X4   Memory Decreased short term memory;Decreased recall of recent events;Decreased recall of precautions   Following Commands Follows one step commands with increased time or repetition   Subjective   Subjective Patient denies having any complaints   Sit to Stand   Type of Assistance Needed Physical assistance   Physical Assistance Level 25% or less   Comment Fei with RW, VC for placement of LUE on RW   Sit to Stand CARE Score 3   Bed-Chair Transfer   Type of Assistance Needed Physical assistance   Physical Assistance Level 25% or less   Comment Fei with RW, verbal cues to stay in walker frame when turning   Chair/Bed-to-Chair Transfer CARE Score 3   Transfer Bed/Chair/Wheelchair   Limitations Noted In Balance;Confidence; Coordination; Endurance;Problem Solving; Sequencing;UE Strength;LE Strength;Vision   Walk 10 Feet   Type of Assistance Needed Physical assistance   Physical Assistance Level 25% or less   Comment Fei with RW   Walk 10 Feet CARE Score 3   Walk 50 Feet with Two Turns   Type of Assistance Needed Physical assistance   Physical Assistance Level 25% or less   Comment Fei with RW   Walk 50 Feet with Two Turns CARE Score 3   Ambulation   Primary Mode of Locomotion Prior to Admission Walk   Distance Walked (feet) 130 ft  (x2)   Assist Device Roller Walker   Gait Pattern Slow Amy;Decreased foot clearance; Forward Flexion;Narrow AYLA;Shuffle;Improper weight shift   Limitations Noted In Balance;Device Management; Endurance; Heel Strike;Posture; Safety;Speed;Strength;Swing; Other   Provided Assistance with: Balance;Direction   Walk Assist Level Minimum Assist   Findings Fei with RW, VC to stay in RW frame, cue to avoid obstacles on L hand side at times. Does the patient walk? 2. Yes   Curb or Single Stair   Style negotiated Curb   Type of Assistance Needed Physical assistance   Physical Assistance Level 51%-75%   Comment with RW, improved carryover of technique from previous sessions, ascend leading with RLE, descend leading with LLE. x4 trials   1 Step (Curb) CARE Score 2   4 Steps   Type of Assistance Needed Physical assistance   Physical Assistance Level 25% or less   Comment Fei via bilateral handrails, ascend with RLE, descend with LLE, performed for strengthening   4 Steps CARE Score 3   12 Steps   Comment fatigued easily   Reason if not Attempted Safety concerns   12 Steps CARE Score 88   Stairs   Type Stairs  ( steps)   # of Steps 4  (+2)   Weight Bearing Precautions Fall Risk   Assist Devices Bilateral Rail   Therapeutic Interventions   Other Ambulating around cones with RW focusing on management of RW, CGA to Fei for balance/stability. Assessment   Treatment Assessment Patient participated in brief 30 minute skilled physical therapy session focusing on transfers, ambulation with RW, and curb/step navigation. Patient demonstrates some difficulty navigating obstacles in pathway and required vc to safely do so. Patient is easily distracted throughout the session and required frequent redirection. PT to continue to work on higher level dynamic balance, transfer training, curb navigation with RW as patient has single step into home, and LE strengthening. Problem List Decreased strength;Decreased endurance; Impaired balance;Decreased mobility;Pain;Decreased skin integrity; Decreased coordination;Decreased cognition; Impaired judgement;Decreased safety awareness; Impaired vision;Decreased range of motion   Barriers to Discharge Inaccessible home environment;Decreased caregiver support   Plan   Treatment/Interventions Functional transfer training;LE strengthening/ROM; Therapeutic exercise; Endurance training;Patient/family training;Equipment eval/education;Gait training;Bed mobility   Progress Progressing toward goals   Recommendation   PT Discharge Recommendation Home with home health rehabilitation   Equipment Recommended Walker   PT Therapy Minutes   PT Time In 0930   PT Time Out 1000   PT Total Time (minutes) 30   PT Mode of treatment - Individual (minutes) 30   PT Mode of treatment - Concurrent (minutes) 0   PT Mode of treatment - Group (minutes) 0   PT Mode of treatment - Co-treat (minutes) 0   PT Mode of Treatment - Total time(minutes) 30 minutes   PT Cumulative Minutes 120   Therapy Time missed   Time missed?  No     Dayday Zamora, PT, DPT

## 2023-07-10 NOTE — PROGRESS NOTES
OT Daily Treatment Note       07/10/23 1230   Pain Assessment   Pain Assessment Tool 0-10   Pain Score 9   Pain Location/Orientation Location: Back   Hospital Pain Intervention(s) Cold applied;Repositioned   Restrictions/Precautions   Precautions 1:1;Aspiration;Bed/chair alarms;Cognitive; Fall Risk;Pain;Supervision on toilet/commode;Visual deficit   Lifestyle   Autonomy "I feel like this makes me feel accomplished"   Eating   Type of Assistance Needed Supervision   Physical Assistance Level No physical assistance   Comment encouraged fluid intake as Santosh Jacobson reports pt was very dry yesterday. supv provided as pt drank ~4oz of nectar thick water   Eating CARE Score 4   Upper Body Dressing   Type of Assistance Needed Physical assistance   Physical Assistance Level 51%-75%   Comment A to thread over LLE, over head and down back   Upper Body Dressing CARE Score 2   Sit to Stand   Type of Assistance Needed Physical assistance   Physical Assistance Level 25% or less   Comment min A/CG with RW; cues for L hand placement on RW   Sit to Stand CARE Score 3   Bed-Chair Transfer   Type of Assistance Needed Physical assistance   Physical Assistance Level 25% or less   Comment min A/CG with RW; cues for L hand placement on RW   Chair/Bed-to-Chair Transfer CARE Score 3   Neuromuscular Education   Comments Pt engaged in UE Neofect evaluation of LUE. See below for details. Upon completion of evaluation, pt engaged in NeoFect treatment activity focusing on LUE AROM, coordination, visual scanning to inc L inattention and attention from a cog prespective. pt completed tasks with 70% accuracy. pt was fatigued towards end of session, requiring short rest break and repositioning in WC 2* R lateral lean.    Cognition   Overall Cognitive Status Impaired   Arousal/Participation Alert;Arousable   Attention Attends with cues to redirect   Orientation Level Oriented X4   Memory Decreased short term memory;Decreased recall of recent events;Decreased recall of precautions   Following Commands Follows one step commands with increased time or repetition   Comments pt presents with dec attention, easily distractable, benefitting from quiet environments with cues to focus and remain attentive to task   [address] in brief informal vision assessment. pt tracking WNL, c/o dizziness with covergence and would proceed to close eyes, periphery WNL but due to pts dec attention, difficult at times to know if pt was not seeing object or forgetting to state she could see it because when repeated, pt able to see within normal range. pt then engaged in L visual attention worksheets presenting with good attending to L with min vc. pt completed easy grade tasks of tracking/scanning for items with 100% accuracy but on moderate grade activity of scanning for a single letter in a large section of random letters, pt negated ~5 letters on L side of page but able to locate with cues. Later in session, pt engaged in Neofect training session focusing on L visual attention and LUE ROM. pt completed first trial for 3 mins with 10 seconds alotted for reaction time - pt completed with 90% accuracy. Graded task up to reaction time of 5 seconds with overall score of 30% but mostly due to user error as stylus was not working effectively. pt did require cues to scan and locate star quicker but feel that pt most likely wouldve scored >50% if stylus was working correctly. pt appreciative of device reporting she felt she was accomplished when completing these activities. pt would benefit from cont L visual attention activities. Additional Activities   Additional Activities Other (Comment)   Additional Activities Comments Completed UE objective measures. Pt is R hand dominant, LUE effected from CVA. Objective measures are as follows: Box & Blocks: R - 25, L - 4.  Dynamometer with gross grasp:  R - 13 kg, L - 5.6 kg; tip pinch: R - 6.1 lbs, L - 2 lbs; key pinch: R - 5.3 lbs, L - 3.5 lbs. Pt reports having OA in BUE at baseline affecting overall fx. Activity Tolerance   Activity Tolerance Patient tolerated treatment well   Assessment   Treatment Assessment Pt participated in skilled OT session with focus on functional transfer training, UE strengthening/ROM, neuro re-ed and continued education. See flowsheet for details of session and current functional status. Pt is limited by weakness, decreased ROM, impaired balance, decreased endurance, decreased coordination, increased fall risk, decreased ADLS, pain, decreased activity tolerance, decreased safety awareness, impaired judgement, decreased cognition, decreased strength, impaired psychosocial skills and visual deficits and requires skilled OT services to increase independence and safety with ADL completion in prep for DC home. Plan to continue ADL retraining, functional transfer training, UE strengthening/ROM, endurance training, cognitive reorientation, compensatory technique education, continued education, energy conservation and activity engagement  to address barriers mentioned above. Prognosis Fair   Problem List Decreased strength;Decreased endurance; Impaired balance;Decreased mobility;Pain;Decreased skin integrity   Barriers to Discharge Inaccessible home environment;Decreased caregiver support   Plan   Treatment/Interventions ADL retraining;Functional transfer training; Therapeutic exercise; Endurance training;Patient/family training;Cognitive reorientation; Compensatory technique education   Progress Progressing toward goals   OT Therapy Minutes   OT Time In 1230   OT Time Out 1400   OT Total Time (minutes) 90   OT Mode of treatment - Individual (minutes) 90   OT Mode of treatment - Concurrent (minutes) 0   OT Mode of treatment - Group (minutes) 0   OT Mode of treatment - Co-treat (minutes) 0   OT Mode of Treatment - Total time(minutes) 90 minutes   OT Cumulative Minutes 180   Therapy Time missed   Time missed? No       Neofect Evaluation L UE      Free Exploration Ratio of area explored  62% Avg reach 12. 6" Time explored 60 seconds   Point to point reaching AVG error reached 2. 2" AVG duration   2.3 sec AVG smoothness of reach  81%   Path Drawing      Clockwise Shape similarity small 100% Circular symmetry small 82% Duration small 19.9 sec   Counter clockwise Shape similarity small 100%   Circular symmetry small 75% Duration small  10.4

## 2023-07-10 NOTE — PROGRESS NOTES
PM&R PROGRESS NOTE:  Belinda Rudd 70 y.o. female MRN: [id number]  Unit/Bed#: -01 Encounter: [id number]        Rehabilitation Diagnosis: Impairment of mobility, safety, Activities of Daily Living (ADLs), and cognitive/communication skills due to Other Disabling Impairments:  13  Other Disabling Impairments    HPI: Belinda Rudd is a 70 y.o. female with medical history of HTN, CVA in 2/2023 on eliquis (chronic dysphagia, L sided weakness, cognitive impairment), hypercalcemia,  CHF, GERD, SHAR, gout, bipolar disorder, RA on plaquenil, chronic opioid dependence on morphine for chronic back/joint pain, who presented to the [address] [place] [address] [address] on 6/30 after a fall, unable to get up and disoriented. She thinks she tripped and fell down her porch steps, landing on he L thigh. Found to have a L lateral thigh hematoma with evidence of extravasation on imaging. Binder placed over hips, and eliquis was held but not reversed given her recent stroke. Speech consulted and noted oropharyngeal swallow likely at baseline and recommended Level 1/NTL. Recommended for VBS. Course c/b ABLA on 7/2 Hgb dropped to 6.8 and she was given 1 unit pRBC. Geriatrics was consulted, noted at baseline she is oriented but forgetful, with worsening issues since her stroke. Mini-Cog was 9/15 in 2021. They recommended checking B12. They noted she has not been on lorazepam since January, and also recommended weaning down off gabapentin if possible. On 7/4 patient had an unwitnessed fall while trying to reach for her bag on the window sill. No head strike, but landed on her L hip. GCS 15, Neuro exam was baseline. CTA ordered to look for active bleeding, which showed decreased size of hematoma with no active extravasation. Hgb did drop after her transfusion, but seems to have stablized today at 8.5.  That being said, her HR was elevated today, and it appears a troponin has been ordered by the trauma team as well as a CXR. CXR showed hazy RLL opacity, likely 2/2 atelectasis, and trops trended down. Metoprolol was increased with improvement. She was admitted to the [facility] on 7/7. SUBJECTIVE: Patient seen face to face. No acute issues overnight. Patient reports slept well, pain in left leg/groin with ambulation; 3/10, does not radiate, rest does not help. Ice applications helped initially, but no longer provide relief. Appetite at baseline, voiding, LBM 7/9. Denies chest pain, shortness of breath, fever, chills, N/V, abdominal pain. Progressing as expected in rehabilitation. ASSESSMENT: Stable, progressing    PLAN:  - urinary tract infection: >100,000 cfu/mL Enteroccous faecalis, susceptible to Nitrofurantoin, started 7/9 for 5 days  - sundowning: virtual 1:1 5pm- 7am  - thrush: continue nystatin  - acute/chronic pain: continue prn oxycodone, consider home dose of MSIR 7.5mg BID prn. added lidocaine patch for acute left leg/groin pain. - hypokalemia: previous 3.2; current 4.1. Continue to monitor with routine labs  -dysphagia: Level1/NTL, continue aspiration precautions,     Rehabilitation  • Functional deficits:  cognitive impairment,   • Continue current rehabilitation plan of care to maximize function. • Functional update:   o PT: mobility- min A, transfers- min A, ambulation- min A RW 50', steps 2  o OT: ADL: bathing- min A, dressing- UB mod-max A LB mod-max A, footwear- min-mod A, toileting- min A  o SLP: dysphagia Level 1/NTL  • Estimated Discharge: anticipated 10-14 days      Pain  • Tylenol 975 mg every 8 hours  • Gabapentin 400 mg BID  • Diclofenac Sodium topical gel QID prn  • Oxycodone 2.5-5 mg every 4 hours prn    DVT prophylaxis  • Eliquis    Bladder plan  • Continent    Bowel plan  • Continent      * Leg hematoma, left, initial encounter  Assessment & Plan  2/2 fall    6/30 CT CAP:  Large hematoma in the lateral left thigh measuring up to 9.6 cm with focus of active contrast extravasation.   No acute intrathoracic or intra-abdominal injury. 7/4 CTA Pelvis:  1. Decreasing size of left lateral thigh hematoma without evidence for active extravasation. 2.  No CT findings in the visualized pelvis and thighs to explain falling hematocrit. 3.  Pancolitis with increasing inflammatory free fluid in the pelvis. No free air, pneumatosis, or encapsulated collections demonstrated. Non-operatively managed. Did require transfusion x1  Pain management as below  Monitor Hgb. PT/OT 3-5 hours/day, 5-7 days/week  Outpatient f/u with General Surgery      History of stroke  Assessment & Plan  MRI brain 2/22 - 1.  New small acute /more recent interval lacunar infarction in the right cerebral peduncle (series 4, image 14). 2.  Extensive multifocal large, previously present right middle cerebral artery infarction which is evolving with superimposed areas of evolving hemorrhage likely related to hemorrhagic transformation of subacute right MCA infarction.  No significant   mass effect. 3.  Mild, chronic microangiopathy. S/P thrombectomy 2/12 by Dr Aleyda Garcia   Secondary stroke prophylaxis at this time with Eliquis 5 mg twice daily   PT, OT, SLP. Review modifiable risk factors and provide stroke education  Monitor for poststroke pain  Eliquis for secondary stroke prophylaxis    Outpatient f/u with Neurology    CHF (congestive heart failure) ([address])  Assessment & Plan  Wt Readings from Last 3 Encounters:   07/10/23 54.6 kg (120 lb 6.4 oz)   06/30/23 55.3 kg (122 lb)   06/06/23 47.9 kg (105 lb 9.6 oz)     2/2023 Echo 30% with G2DD, moderate AI  Home: Toprol XL 25mg BID, Aldactone 25mg daily   - Limited by BP - no ACE/ARB   - no SGLT2i due to recurrent UTI  Here: Toprol XL 25mg BID, spironolactone 25mg daily   Unclear etiology of cardiomyopathy  Possible consideration for ischemic work-up outpatient, poor candidate for aggressive procedures   Monitor I/O/daily weights, volume status, lytes.   IM consulted to assist with management  Outpatient f/u with Dr. Ismael Cardenas      Dysphagia  Assessment & Plan  2/2 previous CVA earlier this year. Noted to have impaired oropharyngeal swallow  Was on free water protocol at home - holding for now due to thrush  On Level 1/NTL  - Inpatient SLP recommended VBS  - Consulting SLP here  - Aspiration precautions  - Strict oral hygiene prior to trials    Mixed stress and urge urinary incontinence  Assessment & Plan  Frankly incontinent here  Initiate timed voids and scans  Has history of recurrent UTIs - will check UA on admission here given extent of incontinence   - Had confusion on 7/8-7/9 overnight. - See UTI for details. Close continence care      Anemia  Assessment & Plan  2/2 L leg hematoma (most recent imaging negative for extravasation)  At home on eliquis  No reversal inpatient due to recent CVA  Prior to admission Hgb 10-12. Ravi 7/2 6.8 - given 1 unit pRBC  7/8 Hgb 8.5 > 9.2 > 9.3  Monitor, transfuse as appropriate. IM consulted to assist with management    Urinary tract infection without hematuria  Assessment & Plan  Present on admission  With incontinence and confusion  Grew 100k CFU Enterococcus    - Started on Macrobid based on antibiogram on 7/9   - sensitivity confirms susceptibility    Sundowning  Assessment & Plan  1 episode happened overnight into 7/9  - More confusion, than agitation  - Wandered all the way down to the cafeteria on the 1st floor  - No injuries  - Brought back to her room  - UCx with 100k enterococcus. - treat UTI  - Low bed, Virtual sitter - may be able to de-escalate to overnight only. - Floor mats. - Delirium precautions    Night terrors  Assessment & Plan  Home: Prazosin  Here: Prazosin  Continue melatonin  Outpatient f/u with PCP    Thrush  Assessment & Plan  Noted on admission  7/7 started on nystatin swish/spit. Hypomagnesemia  Assessment & Plan  Home: Mag oxide 800mg BID  Here: Mag oxide 400mg BID  7/9 Mag 1.9   Goals > 2.     Sinus tachycardia  Assessment & Plan  Home: Toprol XL 25mg BID  Here: Same  On 7/6 with HR in 115-120. EKG showed sinus tach. Trops trended and negative. - Associated with transient/mild SOB   - She was only being given Toprol daily in the hospital   - Increased to BID with improvement  Lytes with K > 4, Mag > 2  Monitor and adjust regimen as appropriate  IM consulted to assist with management  Outpatient f/u with Dr. Grace Rudd    Obstructive sleep apnea  Assessment & Plan  Previously non-compliant with hospital or home CPAP  Outpatient f/u with PCP. Hypercalcemia  Assessment & Plan  Suspect 2/2 primary hyperparathyroidism  7/8 iCal 1.12, Phos 3.3  Home: Sensipar 30mg daily  Here: Same  Recommend outpatient f/u with Endocrinology      HTN (hypertension)  Assessment & Plan  Tends to run on the low/normal side. Home: Toprol XL 25mg BID, Spironolactone 25mg daily  Here: Same  Has not been able to get meds at times due to not meeting parameters. Monitor and adjust as appropriate  IM consulted to assist with management    Bipolar depression [facility])  Assessment & Plan  Home: Bupropion 100mg BID, Cymbalta 60mg BID, Ativan 0.25mg BID PRN (however this has not been filled since January 2023 as per PDMP)  Here: Bupropion 100mg BID, Cymbalta 60mg BID  Consulting rehab psychology for support  Adjust as appropriate  Outpatient f/u with PCP    Severe protein-calorie malnutrition ([address])  Assessment & Plan  Malnutrition Findings:                                 BMI Findings: Body mass index is 20.89 kg/m².      Consulted nutrition    Osteoporosis  Assessment & Plan  Hx L1 compression fx s/p kyphoplasty  Was previously on Prolia - last given in 2020   - Held due to dental issues  Recommend outpatient f/u with Endocrinology  2/23 Vitamin D level is 41.7    Gastroesophageal reflux disease without esophagitis  Assessment & Plan  Home: PPI daily  Here: PPI daily    Seronegative arthropathy of multiple sites [facility])  Assessment & Plan  Follows with Dr. Ramsey Hayden with Rheum and is on Hydroxychloroquine 200mg BID. Continue home regimen and follow-up with rheumatology on discharge    Mild intermittent asthma without complication  Assessment & Plan  No acute exacerbation  Home: Albuterol PRN  Here: Same    Chronic pain syndrome  Assessment & Plan  Joint and back pain  Follows with Dr. Tiffanie Cherry with [facility]  Reviewed PDMP - at home on MSIR 7.5mg BID PRN. Here: Oxycodone 2.5-5mg PRN. - Possible transition back to home meds  - Outpatient f/u with Dr. Tiffanie Cherry. Appreciate IM consultants medical co-management. Labs, medications, and imaging personally reviewed. ROS:  A ten point review of systems was completed on 07/10/23 and pertinent positives are listed in subjective section. All other systems reviewed were negative. Review of Systems     OBJECTIVE:   /60   Pulse 72   Temp (!) 96.8 °F (36 °C) (Axillary)   Resp 20   Ht 5' 3" (1.6 m)   Wt 54.6 kg (120 lb 6.4 oz)   SpO2 97%   BMI 21.33 kg/m²     Physical Exam  Constitutional:       Appearance: Normal appearance. HENT:      Head: Normocephalic and atraumatic. Nose: Nose normal.      Mouth/Throat:      Mouth: Mucous membranes are moist.   Cardiovascular:      Rate and Rhythm: Normal rate and regular rhythm. Pulses: Normal pulses. Pulmonary:      Effort: Pulmonary effort is normal.      Breath sounds: Normal breath sounds. Abdominal:      General: Bowel sounds are normal.      Palpations: Abdomen is soft. Musculoskeletal:         General: Normal range of motion. Cervical back: Normal range of motion. Comments: kyphosis   Skin:     General: Skin is warm and dry. Capillary Refill: Capillary refill takes less than 2 seconds. Findings: Bruising present. Neurological:      Mental Status: She is alert. Motor: Weakness present.    Psychiatric:         Mood and Affect: Mood normal.        Lab Results   Component Value Date    WBC 7.64 07/10/2023 HGB 9.3 (L) 07/10/2023    HCT 29.7 (L) 07/10/2023    MCV 98 07/10/2023     07/10/2023     Lab Results   Component Value Date    SODIUM 143 07/10/2023    K 4.1 07/10/2023     (H) 07/10/2023    CO2 26 07/10/2023    BUN 18 07/10/2023    CREATININE 0.91 07/10/2023    GLUC 81 07/10/2023    CALCIUM 8.1 (L) 07/10/2023     Lab Results   Component Value Date    INR 1.33 (H) 07/01/2023    INR 1.65 (H) 06/30/2023    INR 1.66 (H) 04/29/2023    PROTIME 16.7 (H) 07/01/2023    PROTIME 19.5 (H) 06/30/2023    PROTIME 19.9 (H) 04/29/2023           Current Facility-Administered Medications:   •  acetaminophen (TYLENOL) tablet 650 mg, 650 mg, Oral, Q6H [address], Christophe Rincon MD, 650 mg at 07/10/23 1018  •  albuterol (PROVENTIL HFA,VENTOLIN HFA) inhaler 2 puff, 2 puff, Inhalation, Q6H PRN, Christophe Rincon MD  •  apixaban (ELIQUIS) tablet 5 mg, 5 mg, Oral, BID, Christophe Rincon MD, 5 mg at 07/10/23 1015  •  buPROPion [facility]) tablet 100 mg, 100 mg, Oral, BID, Christophe Rincon MD, 100 mg at 07/10/23 1020  •  cinacalcet (SENSIPAR) tablet 30 mg, 30 mg, Oral, Daily With Breakfast, Christophe Rincon MD, 30 mg at 07/10/23 8286  •  Diclofenac Sodium (VOLTAREN) 1 % topical gel 2 g, 2 g, Topical, 4x Daily PRN, Christophe Rincon MD  •  DULoxetine (CYMBALTA) delayed release capsule 60 mg, 60 mg, Oral, BID, Christophe Rincon MD, 60 mg at 07/10/23 1015  •  gabapentin (NEURONTIN) capsule 400 mg, 400 mg, Oral, BID, Christophe Rincon MD, 400 mg at 07/10/23 1015  •  hydroxychloroquine (PLAQUENIL) tablet 200 mg, 200 mg, Oral, BID With Meals, Christophe Rincon MD, 200 mg at 07/10/23 4209  •  lamoTRIgine (LaMICtal) tablet 150 mg, 150 mg, Oral, HS, Christophe Rincon MD, 150 mg at 07/09/23 2107  •  magnesium Oxide (MAG-OX) tablet 400 mg, 400 mg, Oral, BID, Christophe Rincon MD, 400 mg at 07/10/23 1014  •  melatonin tablet 3 mg, 3 mg, Oral, HS, Christophe Rincon MD, 3 mg at 07/09/23 2029  •  metoprolol succinate (TOPROL-XL) 24 hr tablet 25 mg, 25 mg, Oral, BID, LAURA Flores, 25 mg at 07/10/23 1015  •  nitrofurantoin (MACROBID) extended-release capsule 100 mg, 100 mg, Oral, BID With Meals, Nella Escamilla MD, 100 mg at 07/10/23 2890  •  nystatin (MYCOSTATIN) oral suspension 500,000 Units, 500,000 Units, Swish & Spit, 4x Daily, Nella Escamilla MD, 500,000 Units at 07/10/23 1014  •  ondansetron (ZOFRAN-ODT) dispersible tablet 4 mg, 4 mg, Oral, Q6H PRN, Nella Escamilla MD, 4 mg at 07/09/23 1431  •  oxyCODONE (ROXICODONE) IR tablet 5 mg, 5 mg, Oral, Q4H PRN, Nella Escamilla MD, 5 mg at 07/10/23 1944  •  oxyCODONE (ROXICODONE) split tablet 2.5 mg, 2.5 mg, Oral, Q4H PRN, Nella Escamilla MD, 2.5 mg at 07/09/23 1435  •  pantoprazole (PROTONIX) EC tablet 40 mg, 40 mg, Oral, Early Morning, Nella Escamilla MD, 40 mg at 07/10/23 0502  •  simethicone (MYLICON) chewable tablet 80 mg, 80 mg, Oral, 4x Daily (with meals and at bedtime), LAURA Diaz, 80 mg at 07/10/23 2567  •  spironolactone (ALDACTONE) tablet 25 mg, 25 mg, Oral, Daily, Nella Escamilla MD, 25 mg at 07/10/23 1020    Past Medical History:   Diagnosis Date   • Anxiety    • Asthma    • Bipolar depression ([address])    • Chronic narcotic dependence (HCC)    • Chronic pain    • Depression    • Enterovirus heart infection    • SHAR (obstructive sleep apnea)    • Osteoarthritis    • Peripheral neuropathy    • Plantar fasciitis of right foot    • Senile osteoporosis    • Seronegative arthropathy of multiple sites [facility])    • Undifferentiated connective tissue disease ([address])        Patient Active Problem List    Diagnosis Date Noted   • Leg hematoma, left, initial encounter 07/01/2023   • History of stroke 02/28/2023   • CHF (congestive heart failure) ([address])    • Dysphagia 03/09/2023   • Mixed stress and urge urinary incontinence    • Anemia 06/26/2021   • Urinary tract infection without hematuria 03/06/2023   • Sundowning 07/09/2023   • Thrush 07/07/2023   • Night terrors 07/07/2023   • Cerebral aneurysm, nonruptured 05/15/2023   • Nocturnal hypoxia 05/01/2023   • Stroke-like symptoms 05/01/2023   • Insomnia 04/03/2023   • Hypomagnesemia 03/30/2023   • Sinus tachycardia 03/29/2023   • Hypokalemia 03/10/2023   • AMS (altered mental status) 03/09/2023   • Gout of foot 03/09/2023   • Wound of left ankle 03/09/2023   • Obstructive sleep apnea 02/28/2023   • History of bacteremia 02/24/2023   • Acute CVA (cerebrovascular accident) ([address]) 02/23/2023   • Hypercalcemia 02/22/2023   • Discoloration of skin of foot 02/22/2023   • Abdominal pain 02/22/2023   • HFrEF (heart failure with reduced ejection fraction) ([address]) 02/13/2023   • Stroke ([address]) 02/12/2023   • HTN (hypertension) 02/12/2023   • Cardiomyopathy ([address]) 02/07/2023   • Vitamin D insufficiency 02/07/2023   • LAILA (acute kidney injury) ([address]) 04/23/2022   • Polypharmacy 04/23/2022   • 'light-for-dates' infant with signs of fetal malnutrition 04/22/2022   • Bipolar depression ([address]) 03/04/2022   • Rheumatoid arthritis of multiple sites with negative rheumatoid factor ([address]) 03/04/2022   • Closed fracture of multiple ribs of right side 03/02/2022   • Traumatic pneumothorax 03/02/2022   • Closed fracture of transverse process of lumbar vertebra ([address]) 03/02/2022   • Liver contusion 03/02/2022   • Fall 03/02/2022   • Acute pain due to trauma 03/02/2022   • Right shoulder pain 03/02/2022   • Severe protein-calorie malnutrition ([address]) 03/02/2022   • Undifferentiated connective tissue disease ([address]) 12/29/2021   • Primary generalized (osteo)arthritis 12/29/2021   • Peripheral neuropathy 12/29/2021   • Osteoporosis 12/29/2021   • Hx of compression fracture of spine 12/29/2021   • Lumbar spondylosis 12/29/2021   • Cervical spondylosis 12/29/2021   • Chronic pain syndrome    • Mild intermittent asthma without complication    • Ambulatory dysfunction    • Seronegative arthropathy of multiple sites ([address])    • Gastroesophageal reflux disease without esophagitis         Reddy Fore, LAURA  Physical Medicine and [place]

## 2023-07-11 PROBLEM — G24.3 CERVICAL DYSTONIA: Status: ACTIVE | Noted: 2023-07-11

## 2023-07-11 PROCEDURE — 97110 THERAPEUTIC EXERCISES: CPT

## 2023-07-11 PROCEDURE — 97535 SELF CARE MNGMENT TRAINING: CPT

## 2023-07-11 PROCEDURE — 92526 ORAL FUNCTION THERAPY: CPT

## 2023-07-11 PROCEDURE — 99232 SBSQ HOSP IP/OBS MODERATE 35: CPT | Performed by: PHYSICAL MEDICINE & REHABILITATION

## 2023-07-11 PROCEDURE — 97112 NEUROMUSCULAR REEDUCATION: CPT

## 2023-07-11 PROCEDURE — 97116 GAIT TRAINING THERAPY: CPT

## 2023-07-11 PROCEDURE — 97530 THERAPEUTIC ACTIVITIES: CPT

## 2023-07-11 PROCEDURE — 99232 SBSQ HOSP IP/OBS MODERATE 35: CPT | Performed by: INTERNAL MEDICINE

## 2023-07-11 RX ADMIN — SPIRONOLACTONE 25 MG: 25 TABLET ORAL at 08:22

## 2023-07-11 RX ADMIN — METOPROLOL SUCCINATE 25 MG: 25 TABLET, FILM COATED, EXTENDED RELEASE ORAL at 08:22

## 2023-07-11 RX ADMIN — SIMETHICONE 80 MG: 80 TABLET, CHEWABLE ORAL at 22:10

## 2023-07-11 RX ADMIN — MAGNESIUM OXIDE TAB 400 MG (241.3 MG ELEMENTAL MG) 400 MG: 400 (241.3 MG) TAB at 17:07

## 2023-07-11 RX ADMIN — ACETAMINOPHEN 650 MG: 325 TABLET, FILM COATED ORAL at 12:35

## 2023-07-11 RX ADMIN — NYSTATIN 500000 UNITS: 100000 SUSPENSION ORAL at 17:09

## 2023-07-11 RX ADMIN — NYSTATIN 500000 UNITS: 100000 SUSPENSION ORAL at 08:14

## 2023-07-11 RX ADMIN — BUPROPION HYDROCHLORIDE 100 MG: 100 TABLET, FILM COATED ORAL at 17:06

## 2023-07-11 RX ADMIN — SIMETHICONE 80 MG: 80 TABLET, CHEWABLE ORAL at 17:02

## 2023-07-11 RX ADMIN — CINACALCET 30 MG: 30 TABLET, FILM COATED ORAL at 06:56

## 2023-07-11 RX ADMIN — MELATONIN TAB 3 MG 3 MG: 3 TAB at 22:10

## 2023-07-11 RX ADMIN — LAMOTRIGINE 150 MG: 100 TABLET ORAL at 22:10

## 2023-07-11 RX ADMIN — HYDROXYCHLOROQUINE SULFATE 200 MG: 200 TABLET ORAL at 06:56

## 2023-07-11 RX ADMIN — ACETAMINOPHEN 650 MG: 325 TABLET, FILM COATED ORAL at 17:08

## 2023-07-11 RX ADMIN — SIMETHICONE 80 MG: 80 TABLET, CHEWABLE ORAL at 12:35

## 2023-07-11 RX ADMIN — PANTOPRAZOLE SODIUM 40 MG: 40 TABLET, DELAYED RELEASE ORAL at 05:10

## 2023-07-11 RX ADMIN — APIXABAN 5 MG: 5 TABLET, FILM COATED ORAL at 17:07

## 2023-07-11 RX ADMIN — OXYCODONE HYDROCHLORIDE 5 MG: 5 TABLET ORAL at 19:34

## 2023-07-11 RX ADMIN — NITROFURANTOIN (MONOHYDRATE/MACROCRYSTALS) 100 MG: 75; 25 CAPSULE ORAL at 06:56

## 2023-07-11 RX ADMIN — LIDOCAINE 5% 1 PATCH: 700 PATCH TOPICAL at 08:20

## 2023-07-11 RX ADMIN — BUPROPION HYDROCHLORIDE 100 MG: 100 TABLET, FILM COATED ORAL at 08:21

## 2023-07-11 RX ADMIN — MAGNESIUM OXIDE TAB 400 MG (241.3 MG ELEMENTAL MG) 400 MG: 400 (241.3 MG) TAB at 08:21

## 2023-07-11 RX ADMIN — METOPROLOL SUCCINATE 25 MG: 25 TABLET, FILM COATED, EXTENDED RELEASE ORAL at 22:10

## 2023-07-11 RX ADMIN — DULOXETINE HYDROCHLORIDE 60 MG: 60 CAPSULE, DELAYED RELEASE ORAL at 17:07

## 2023-07-11 RX ADMIN — OXYCODONE HYDROCHLORIDE 5 MG: 5 TABLET ORAL at 12:36

## 2023-07-11 RX ADMIN — NYSTATIN 500000 UNITS: 100000 SUSPENSION ORAL at 12:35

## 2023-07-11 RX ADMIN — NYSTATIN 500000 UNITS: 100000 SUSPENSION ORAL at 22:10

## 2023-07-11 RX ADMIN — HYDROXYCHLOROQUINE SULFATE 200 MG: 200 TABLET ORAL at 17:06

## 2023-07-11 RX ADMIN — GABAPENTIN 400 MG: 400 CAPSULE ORAL at 08:21

## 2023-07-11 RX ADMIN — DULOXETINE HYDROCHLORIDE 60 MG: 60 CAPSULE, DELAYED RELEASE ORAL at 08:21

## 2023-07-11 RX ADMIN — ACETAMINOPHEN 650 MG: 325 TABLET, FILM COATED ORAL at 05:10

## 2023-07-11 RX ADMIN — NITROFURANTOIN (MONOHYDRATE/MACROCRYSTALS) 100 MG: 75; 25 CAPSULE ORAL at 17:10

## 2023-07-11 RX ADMIN — SIMETHICONE 80 MG: 80 TABLET, CHEWABLE ORAL at 06:56

## 2023-07-11 RX ADMIN — GABAPENTIN 400 MG: 400 CAPSULE ORAL at 17:07

## 2023-07-11 RX ADMIN — APIXABAN 5 MG: 5 TABLET, FILM COATED ORAL at 08:21

## 2023-07-11 NOTE — PLAN OF CARE
Reviewed    Problem: PAIN - ADULT  Goal: Verbalizes/displays adequate comfort level or baseline comfort level  Description: Interventions:  - Encourage patient to monitor pain and request assistance  - Assess pain using appropriate pain scale  - Administer analgesics based on type and severity of pain and evaluate response  - Implement non-pharmacological measures as appropriate and evaluate response  - Consider cultural and social influences on pain and pain management  - Notify physician/advanced practitioner if interventions unsuccessful or patient reports new pain  Outcome: Progressing     Problem: INFECTION - ADULT  Goal: Absence or prevention of progression during hospitalization  Description: INTERVENTIONS:  - Assess and monitor for signs and symptoms of infection  - Monitor lab/diagnostic results  - Monitor all insertion sites, i.e. indwelling lines, tubes, and drains  - Monitor endotracheal if appropriate and nasal secretions for changes in amount and color  - [place] appropriate cooling/warming therapies per order  - Administer medications as ordered  - Instruct and encourage patient and family to use good hand hygiene technique  - Identify and instruct in appropriate isolation precautions for identified infection/condition  Outcome: Progressing  Goal: Absence of fever/infection during neutropenic period  Description: INTERVENTIONS:  - Monitor WBC    Outcome: Progressing     Problem: SAFETY ADULT  Goal: Patient will remain free of falls  Description: INTERVENTIONS:  - Educate patient/family on patient safety including physical limitations  - Instruct patient to call for assistance with activity   - Consult OT/PT to assist with strengthening/mobility   - Keep Call bell within reach  - Keep bed low and locked with side rails adjusted as appropriate  - Keep care items and personal belongings within reach  - Initiate and maintain comfort rounds  - Make Fall Risk Sign visible to staff  - Offer Toileting every 4 Hours, in advance of need  - Initiate/Maintain bed and chair alarm  - Apply yellow socks and bracelet for high fall risk patients  - Consider moving patient to room near nurses station  Outcome: Progressing  Goal: Maintain or return to baseline ADL function  Description: INTERVENTIONS:  -  Assess patient's ability to carry out ADLs; assess patient's baseline for ADL function and identify physical deficits which impact ability to perform ADLs (bathing, care of mouth/teeth, toileting, grooming, dressing, etc.)  - Assess/evaluate cause of self-care deficits   - Assess range of motion  - Assess patient's mobility; develop plan if impaired  - Assess patient's need for assistive devices and provide as appropriate  - Encourage maximum independence but intervene and supervise when necessary  - Involve family in performance of ADLs  - Assess for home care needs following discharge   - Consider OT consult to assist with ADL evaluation and planning for discharge  - Provide patient education as appropriate  Outcome: Progressing  Goal: Maintains/Returns to pre admission functional level  Description: INTERVENTIONS:  - Set and communicate daily mobility goal to care team and patient/family/caregiver.    - Collaborate with rehabilitation services on mobility goals if consulted  - Out of bed for toileting  - Record patient progress and toleration of activity level   Outcome: Progressing     Problem: DISCHARGE PLANNING  Goal: Discharge to home or other facility with appropriate resources  Description: INTERVENTIONS:  - Identify barriers to discharge w/patient and caregiver  - Arrange for needed discharge resources and transportation as appropriate  - Identify discharge learning needs (meds, wound care, etc.)  - Arrange for interpretive services to assist at discharge as needed  - Refer to Case Management Department for coordinating discharge planning if the patient needs post-hospital services based on physician/advanced practitioner order or complex needs related to functional status, cognitive ability, or social support system  Outcome: Progressing     Problem: Prexisting or High Potential for Compromised Skin Integrity  Goal: Skin integrity is maintained or improved  Description: INTERVENTIONS:  - Identify patients at risk for skin breakdown  - Assess and monitor skin integrity  - Assess and monitor nutrition and hydration status  - Monitor labs   - Assess for incontinence   - Turn and reposition patient  - Assist with mobility/ambulation  - Relieve pressure over bony prominences  - Avoid friction and shearing  - Provide appropriate hygiene as needed including keeping skin clean and dry  - Evaluate need for skin moisturizer/barrier cream  - Collaborate with interdisciplinary team   - Patient/family teaching  - Consider wound care consult   Outcome: Progressing     Problem: Nutrition/Hydration-ADULT  Goal: Nutrient/Hydration intake appropriate for improving, restoring or maintaining nutritional needs  Description: Monitor and assess patient's nutrition/hydration status for malnutrition. Collaborate with interdisciplinary team and initiate plan and interventions as ordered. Monitor patient's weight and dietary intake as ordered or per policy. Utilize nutrition screening tool and intervene as necessary. Determine patient's food preferences and provide high-protein, high-caloric foods as appropriate.      INTERVENTIONS:  - Monitor oral intake, urinary output, labs, and treatment plans  - Assess nutrition and hydration status and recommend course of action  - Evaluate amount of meals eaten  - Assist patient with eating if necessary   - Allow adequate time for meals  - Recommend/ encourage appropriate diets, oral nutritional supplements, and vitamin/mineral supplements  - Order, calculate, and assess calorie counts as needed  - Recommend, monitor, and adjust tube feedings and TPN/PPN based on assessed needs  - Assess need for intravenous fluids  - Provide specific nutrition/hydration education as appropriate  - Include patient/family/caregiver in decisions related to nutrition  Outcome: Progressing

## 2023-07-11 NOTE — PROGRESS NOTES
07/11/23 1030   Pain Assessment   Pain Assessment Tool 0-10   Pain Score No Pain   Restrictions/Precautions   Precautions 1:1;Aspiration;Bed/chair alarms;Cognitive; Fall Risk;Impulsive;Pain;Supervision on toilet/commode;Visual deficit   Weight Bearing Restrictions No   ROM Restrictions No   Lifestyle   Autonomy "Can we do the. ...again?" referring to NeoFect   Sit to Stand   Type of Assistance Needed Physical assistance;Verbal cues   Physical Assistance Level 26%-50%   Comment with RW; VCs for hand placement   Sit to Stand CARE Score 3   Bed-Chair Transfer   Type of Assistance Needed Physical assistance;Verbal cues   Physical Assistance Level 26%-50%   Comment with RW; VCs for RW safety   Chair/Bed-to-Chair Transfer CARE Score 3   Coordination   Gross Motor pt engages in LUE NMR using NeoFect participating in various activities focusing on coordination, sustained attention, divided attention, and visual scanning. of note, tv screen placed on L side of visual field with pt requiring increased VCs for attending to L side. difficulty noted with sustained attention with focusing on "slowing pt down" with pt stating, "that's hard to do". pt finds the NeoFect very beneficial and useful and thoroughly enjoys using during OT sessions. Cognition   Overall Cognitive Status Impaired   Arousal/Participation Alert;Arousable   Attention Attends with cues to redirect   Orientation Level Oriented X4   Memory Decreased short term memory;Decreased recall of recent events;Decreased recall of precautions   Following Commands Follows one step commands with increased time or repetition   Activity Tolerance   Activity Tolerance Patient tolerated treatment well   Assessment   Treatment Assessment pt engages in brief 38 minute skilled OT session focusing on func transfers with RW and LUE NMR. see above for full func details.  pt's daughter present for session, reporting similar unsafe behaviors with RW such as letting walker get too far forward, resting R forearm on RW handle vs holding onto it with full hand grasp, as well as flexed posture from previous back injuries. pt continues to require VCs throughout session for RW safety and attention to L visual field. recommend continued skilled care to focus on ADL retraining, func transfers, standing karishma/bal, LUE NMR, func cog/safety/vision, in order to decrease burden of care at d/c. Prognosis Fair   Problem List Decreased strength;Decreased endurance; Impaired balance;Decreased mobility;Pain;Decreased skin integrity   Barriers to Discharge Inaccessible home environment;Decreased caregiver support   Plan   Treatment/Interventions ADL retraining;Functional transfer training; Therapeutic exercise; Endurance training;Cognitive reorientation;Patient/family training;Equipment eval/education; Compensatory technique education   OT Therapy Minutes   OT Time In 1030   OT Time Out 1108   OT Total Time (minutes) 38   OT Mode of treatment - Individual (minutes) 38   OT Mode of treatment - Concurrent (minutes) 0   OT Mode of treatment - Group (minutes) 0   OT Mode of treatment - Co-treat (minutes) 0   OT Mode of Treatment - Total time(minutes) 38 minutes   OT Cumulative Minutes 308   Therapy Time missed   Time missed?  No

## 2023-07-11 NOTE — PROGRESS NOTES
07/11/23 1400   Pain Assessment   Pain Assessment Tool 0-10   Pain Score 6   Pain Location/Orientation Orientation: Left; Location: Leg;Location: Back   Restrictions/Precautions   Precautions 1:1;Aspiration;Bed/chair alarms;Cognitive; Fall Risk;Impulsive;Pain;Supervision on toilet/commode;Visual deficit   Weight Bearing Restrictions No   ROM Restrictions No   Cognition   Overall Cognitive Status Impaired   Arousal/Participation Alert;Arousable   Attention Attends with cues to redirect   Orientation Level Oriented X4   Memory Decreased short term memory;Decreased recall of recent events;Decreased recall of precautions   Following Commands Follows one step commands with increased time or repetition   Sit to Lying   Type of Assistance Needed Physical assistance   Physical Assistance Level 25% or less   Comment Fei for management of LEs   Sit to Lying CARE Score 3   Sit to Stand   Type of Assistance Needed Physical assistance   Physical Assistance Level 25% or less   Comment Fei with RW, VC for proper hand placement and to push up from chair   Sit to Stand CARE Score 3   Bed-Chair Transfer   Type of Assistance Needed Physical assistance   Physical Assistance Level 25% or less   Comment Fei with RW, VC for proper hand placement and to complete turn with RW before sitting down. Chair/Bed-to-Chair Transfer CARE Score 3   Walk 10 Feet   Type of Assistance Needed Physical assistance   Physical Assistance Level 25% or less   Comment CGA/Fei with RW   Walk 10 Feet CARE Score 3   Walk 50 Feet with Two Turns   Type of Assistance Needed Physical assistance   Physical Assistance Level 25% or less   Comment CGA/Fei with RW, + for small LOB after not clearing LLE   Walk 50 Feet with Two Turns CARE Score 3   Ambulation   Primary Mode of Locomotion Prior to Admission Walk   Distance Walked (feet) 140 ft   Assist Device Roller Walker   Gait Pattern   (Decreased foot clearance; Step to; Step through;  Improper weight shift; Decreased L stance; Narrow AYLA; Forward Flexion)   Limitations Noted In Balance;Device Management; Safety;Posture;Speed;Strength;Swing   Provided Assistance with: Balance;Direction   Walk Assist Level Minimum Assist;Contact Guard   Does the patient walk? 2. Yes   Therapeutic Interventions   Strengthening Seated knee extension 1# 3x10 each LE, seated hip abduction with yellow TB 3x10, seated hamstring curl with Yellow TB 3x10, seated hip adduction 3x10   Assessment   Treatment Assessment Patient engaged in brief 30 minute skilled physical therapy session focusing on transfers, gait training, and LE Strengthening. Patient very fatigued this afternoon. Patient did experience one small LOB during ambulation requiring Fei. Would benefit from continued PT to work on LE strengthening, curb/step management, gait training, and transfer training. Problem List Decreased strength;Decreased endurance; Impaired balance;Decreased mobility;Pain;Decreased skin integrity   Barriers to Discharge Inaccessible home environment;Decreased caregiver support   Plan   Treatment/Interventions Functional transfer training;LE strengthening/ROM; Elevations; Therapeutic exercise;Patient/family training;Equipment eval/education; Endurance training;Bed mobility;Gait training   Progress Progressing toward goals   Recommendation   PT Discharge Recommendation Home with home health rehabilitation   Equipment Recommended Walker   PT Therapy Minutes   PT Time In 1400   PT Time Out 1430   PT Total Time (minutes) 30   PT Mode of treatment - Individual (minutes) 30   PT Mode of treatment - Concurrent (minutes) 0   PT Mode of treatment - Group (minutes) 0   PT Mode of treatment - Co-treat (minutes) 0   PT Mode of Treatment - Total time(minutes) 30 minutes   PT Cumulative Minutes 190   Therapy Time missed   Time missed? No        07/11/23 1400   Pain Assessment   Pain Assessment Tool 0-10   Pain Score 6   Pain Location/Orientation Orientation: Left; Location: Leg;Location: Back   Restrictions/Precautions   Precautions 1:1;Aspiration;Bed/chair alarms;Cognitive; Fall Risk;Impulsive;Pain;Supervision on toilet/commode;Visual deficit   Weight Bearing Restrictions No   ROM Restrictions No   Cognition   Overall Cognitive Status Impaired   Arousal/Participation Alert;Arousable   Attention Attends with cues to redirect   Orientation Level Oriented X4   Memory Decreased short term memory;Decreased recall of recent events;Decreased recall of precautions   Following Commands Follows one step commands with increased time or repetition   Sit to Lying   Type of Assistance Needed Physical assistance   Physical Assistance Level 25% or less   Comment Fei for management of LEs   Sit to Lying CARE Score 3   Sit to Stand   Type of Assistance Needed Physical assistance   Physical Assistance Level 25% or less   Comment Fei with RW, VC for proper hand placement and to push up from chair   Sit to Stand CARE Score 3   Bed-Chair Transfer   Type of Assistance Needed Physical assistance   Physical Assistance Level 25% or less   Comment Fei with RW, VC for proper hand placement and to complete turn with RW before sitting down. Chair/Bed-to-Chair Transfer CARE Score 3   Walk 10 Feet   Type of Assistance Needed Physical assistance   Physical Assistance Level 25% or less   Comment CGA/Fei with RW   Walk 10 Feet CARE Score 3   Walk 50 Feet with Two Turns   Type of Assistance Needed Physical assistance   Physical Assistance Level 25% or less   Comment CGA/Fei with RW, + for small LOB after not clearing LLE   Walk 50 Feet with Two Turns CARE Score 3   Ambulation   Primary Mode of Locomotion Prior to Admission Walk   Distance Walked (feet) 140 ft   Assist Device Roller Walker   Gait Pattern   (Decreased foot clearance; Step to; Step through; Improper weight shift; Decreased L stance; Narrow AYLA; Forward Flexion)   Limitations Noted In Balance;Device Management; Safety;Posture;Speed;Strength;Swing Provided Assistance with: Balance;Direction   Walk Assist Level Minimum Assist;Contact Guard   Does the patient walk? 2. Yes   Therapeutic Interventions   Strengthening Seated knee extension 1# 3x10 each LE, seated hip abduction with yellow TB 3x10, seated hamstring curl with Yellow TB 3x10, seated hip adduction 3x10   Assessment   Treatment Assessment Patient engaged in brief 30 minute skilled physical therapy session focusing on transfers, gait training, and LE Strengthening. Patient very fatigued this afternoon. Patient did experience one small LOB during ambulation requiring Fei. Would benefit from continued PT to work on LE strengthening, curb/step management, gait training, and transfer training. Problem List Decreased strength;Decreased endurance; Impaired balance;Decreased mobility;Pain;Decreased skin integrity   Barriers to Discharge Inaccessible home environment;Decreased caregiver support   Plan   Treatment/Interventions Functional transfer training;LE strengthening/ROM; Elevations; Therapeutic exercise;Patient/family training;Equipment eval/education; Endurance training;Bed mobility;Gait training   Progress Progressing toward goals   Recommendation   PT Discharge Recommendation Home with home health rehabilitation   Equipment Recommended Walker   PT Therapy Minutes   PT Time In 1400   PT Time Out 1430   PT Total Time (minutes) 30   PT Mode of treatment - Individual (minutes) 30   PT Mode of treatment - Concurrent (minutes) 0   PT Mode of treatment - Group (minutes) 0   PT Mode of treatment - Co-treat (minutes) 0   PT Mode of Treatment - Total time(minutes) 30 minutes   PT Cumulative Minutes 190   Therapy Time missed   Time missed?  No     Marlee Gunderson, PT, DPT

## 2023-07-11 NOTE — ASSESSMENT & PLAN NOTE
Recommend outpatient f/u with Dr. Tiago Saunders to evaluate for botulinum toxin injections  Appears to have a retrocollis.

## 2023-07-11 NOTE — PLAN OF CARE
Problem: PAIN - ADULT  Goal: Verbalizes/displays adequate comfort level or baseline comfort level  Description: Interventions:  - Encourage patient to monitor pain and request assistance  - Assess pain using appropriate pain scale  - Administer analgesics based on type and severity of pain and evaluate response  - Implement non-pharmacological measures as appropriate and evaluate response  - Consider cultural and social influences on pain and pain management  - Notify physician/advanced practitioner if interventions unsuccessful or patient reports new pain  Outcome: Progressing     Problem: INFECTION - ADULT  Goal: Absence or prevention of progression during hospitalization  Description: INTERVENTIONS:  - Assess and monitor for signs and symptoms of infection  - Monitor lab/diagnostic results  - Monitor all insertion sites, i.e. indwelling lines, tubes, and drains  - Monitor endotracheal if appropriate and nasal secretions for changes in amount and color  - [place] appropriate cooling/warming therapies per order  - Administer medications as ordered  - Instruct and encourage patient and family to use good hand hygiene technique  - Identify and instruct in appropriate isolation precautions for identified infection/condition  Outcome: Progressing  Goal: Absence of fever/infection during neutropenic period  Description: INTERVENTIONS:  - Monitor WBC    Outcome: Progressing     Problem: SAFETY ADULT  Goal: Patient will remain free of falls  Description: INTERVENTIONS:  - Educate patient/family on patient safety including physical limitations  - Instruct patient to call for assistance with activity   - Consult OT/PT to assist with strengthening/mobility   - Keep Call bell within reach  - Keep bed low and locked with side rails adjusted as appropriate  - Keep care items and personal belongings within reach  - Initiate and maintain comfort rounds  - Make Fall Risk Sign visible to staff  - Offer Toileting every 2 Hours, in advance of need  - Initiate/Maintain bed. chair alarm  - Obtain necessary fall risk management equipment: nonskid socks  - Apply yellow socks and bracelet for high fall risk patients  - Consider moving patient to room near nurses station  Outcome: Progressing  Goal: Maintain or return to baseline ADL function  Description: INTERVENTIONS:  -  Assess patient's ability to carry out ADLs; assess patient's baseline for ADL function and identify physical deficits which impact ability to perform ADLs (bathing, care of mouth/teeth, toileting, grooming, dressing, etc.)  - Assess/evaluate cause of self-care deficits   - Assess range of motion  - Assess patient's mobility; develop plan if impaired  - Assess patient's need for assistive devices and provide as appropriate  - Encourage maximum independence but intervene and supervise when necessary  - Involve family in performance of ADLs  - Assess for home care needs following discharge   - Consider OT consult to assist with ADL evaluation and planning for discharge  - Provide patient education as appropriate  Outcome: Progressing  Goal: Maintains/Returns to pre admission functional level  Description: INTERVENTIONS:  - Perform BMAT or MOVE assessment daily.   - Set and communicate daily mobility goal to care team and patient/family/caregiver. - Collaborate with rehabilitation services on mobility goals if consulted  - Perform Range of Motion 3 times a day. - Reposition patient every 2 hours.   - Dangle patient 3 times a day  - Stand patient 3 times a day  - Ambulate patient 3 times a day  - Out of bed to chair 3 times a day   - Out of bed for meals 3 times a day  - Out of bed for toileting  - Record patient progress and toleration of activity level   Outcome: Progressing     Problem: DISCHARGE PLANNING  Goal: Discharge to home or other facility with appropriate resources  Description: INTERVENTIONS:  - Identify barriers to discharge w/patient and caregiver  - Arrange for needed discharge resources and transportation as appropriate  - Identify discharge learning needs (meds, wound care, etc.)  - Arrange for interpretive services to assist at discharge as needed  - Refer to Case Management Department for coordinating discharge planning if the patient needs post-hospital services based on physician/advanced practitioner order or complex needs related to functional status, cognitive ability, or social support system  Outcome: Progressing     Problem: Prexisting or High Potential for Compromised Skin Integrity  Goal: Skin integrity is maintained or improved  Description: INTERVENTIONS:  - Identify patients at risk for skin breakdown  - Assess and monitor skin integrity  - Assess and monitor nutrition and hydration status  - Monitor labs   - Assess for incontinence   - Turn and reposition patient  - Assist with mobility/ambulation  - Relieve pressure over bony prominences  - Avoid friction and shearing  - Provide appropriate hygiene as needed including keeping skin clean and dry  - Evaluate need for skin moisturizer/barrier cream  - Collaborate with interdisciplinary team   - Patient/family teaching  - Consider wound care consult   Outcome: Progressing     Problem: Nutrition/Hydration-ADULT  Goal: Nutrient/Hydration intake appropriate for improving, restoring or maintaining nutritional needs  Description: Monitor and assess patient's nutrition/hydration status for malnutrition. Collaborate with interdisciplinary team and initiate plan and interventions as ordered. Monitor patient's weight and dietary intake as ordered or per policy. Utilize nutrition screening tool and intervene as necessary. Determine patient's food preferences and provide high-protein, high-caloric foods as appropriate.      INTERVENTIONS:  - Monitor oral intake, urinary output, labs, and treatment plans  - Assess nutrition and hydration status and recommend course of action  - Evaluate amount of meals eaten  - Assist patient with eating if necessary   - Allow adequate time for meals  - Recommend/ encourage appropriate diets, oral nutritional supplements, and vitamin/mineral supplements  - Order, calculate, and assess calorie counts as needed  - Recommend, monitor, and adjust tube feedings and TPN/PPN based on assessed needs  - Assess need for intravenous fluids  - Provide specific nutrition/hydration education as appropriate  - Include patient/family/caregiver in decisions related to nutrition  Outcome: Progressing

## 2023-07-11 NOTE — PCC PHYSICAL THERAPY
Date: 7/12/2023  Date of Evaluation: 7/10/2023  Estimated Length of Stay: 10-14 days     Barriers to Discharge Home: decreased caregiver support, cognition, high fall risk, decreased righting reactions, inaccessible home environment (1 TAB)  PT Interventions to address Barriers: bed mobility, transfer training, gait training, higher level dynamic balance, single step/curb navigation. Equipments Needs: pt reports having RW at home.    PT Plan of Care for the Week: gait training with RW, transfer training, LE strengthening, higher level dynamic balance   Family Training Needed: TBD pending patient progress   Discharge Planning: home health PT vs subacute rehab    Date: 7/18/23  Date of Evaluation:7/10/2023  Estimated Length of Stay: D/C Friday 7/21/23    Barriers to Discharge Home: cognition, high fall risk, decreased righting reactions  PT Interventions to address Barriers: higher level dynamic balance, single step/curb navigation, LE strengthening, repetitive gait  Equipments Needs: has RW  PT Plan of Care for the Week: gait training with RW, transfer training, LE strengthening, higher level dynamic balance   Family Training Needed: Family declined stating she feels she is same as PLOF  Discharge Planning: home health PT    Date: 7/2523  Date of Evaluation:7/10/2023  Estimated Length of Stay: extended due to acute T12 fx resulting from fall    Barriers to Discharge Home: cognition, high fall risk, decreased righting reactions  PT Interventions to address Barriers: higher level dynamic balance, single step/curb navigation, LE strengthening, repetitive gait, pain management  Equipments Needs: has RW  PT Plan of Care for the Week: gait training with RW, transfer training, LE strengthening, higher level dynamic balance   Family Training Needed: Daughter came for PT session 7/24/23  Discharge Planning: home health PT

## 2023-07-11 NOTE — PROGRESS NOTES
07/11/23 0800   Pain Assessment   Pain Assessment Tool 0-10   Pain Score 4   Pain Location/Orientation Location: Head   Restrictions/Precautions   Precautions 1:1;Aspiration;Bed/chair alarms;Cognitive; Fall Risk;Impulsive;Pain;Supervision on toilet/commode;Visual deficit   Eating   Type of Assistance Needed Set-up / clean-up;Supervision;Verbal cues   Physical Assistance Level No physical assistance   Eating CARE Score 4   Swallow Assessment   Swallow Treatment Assessment Daily Dysphagia Tx Note     Patient Name: Sha Goodman    UITDA'B Date: 7/11/2023      Current Risks for Dysphagia & Aspiration: Weak voicing; weak cough; dysarthria; general debilitation; new neuro event; brain injury; cognitive deficit; hx neurologic dx; positioning issues    Current Symptoms/Concerns: difficulty chewing; decreased oral intake; hx of dysphagia/aspiration; throat clearing; cough with intake    Current diet:puree/level 1 diet and nectar thick liquids     Premorbid diet::puree/level 1 diet and nectar thick liquids     Positioning: upright in chair    Items administered:Consistencies Administered: thin liquids, puree and mechanical soft solids  Materials administered included thin coffee, rise krispie cereal with minimal thin milk, puree waffles, puree pears, cream of wheat, boost pudding and RN administered pills whole in puree    Total amount of meal consumed:   75%  120cc by cup sips      Oral stage:moderate  Lip closure: complete; fair bolus retrieval  Anterior spillage: trace amounts with coffee  Mastication: mild-moderately prolonged with cereal  Bolus formation: decreased   Bolus control: suspect reduced, especially with liquids  Transfer: slow and occasionally appeared labored more so with puree but prompter transfers of thins  Oral residue: minimal which pt cleared using liquids or smooth puree  Pocketing: none       Pharyngeal stage:moderate  Swallow promptness: suspected delayed  Hyolaryngeal elevation: present to palpation, but judged to be reduced  Wet voice: none  Throat clear: intermittently with cereal, puree  Cough: immediate cough x1 with thins with delayed cough x2 with thins; delayed cough x2 with smoother purees  Secondary swallows: piecemeal transfers of cereal and some purees  Audible swallows: present       Esophageal stage:  No overt s/s observed by SLP or reported by patient. Summary:     Pt presenting with s/s suggestive of moderate oral and pharyngeal dysphagia this date. Current symptoms or concerns included trace anterior loss of thins, slow manipulation of puree, prolonged mastication of level 2 trials, reduced bolus formation, suspected reduced bolus control, slow and at times labored transfers, suspected pharyngeal swallow delay, suspected reduced hyolaryngeal elevation, secondary swallows, audible swallows and presence of frequent throat clearing and coughing across meal.     Given assist with tray set up, pt self fed meal. Oral manipulation, mastication and transfers noted to be slower and more effortful with food items but fairly timely with thin liquids, along with swallows. As meal progressed, swallows of both puree and cereal also appeared more effortful and strenuous. RN present during session and administered pills whole in puree. Pt appeared with greater difficulty transferring items but declined any difficulties and instead reported more difficult initiating swallows of pills. Pt declined to take pills crushed for now. Throughout session, pt intermittently with mild throat clearing.  Immediate cough x1 with thins, though with continued intermittent delayed coughing throughout meal.      Recommendations:  Diet: puree/level 1 diet and nectar thick liquids  Meds: whole or cut with puree  Strategies: upright posture, only feed when fully alert, slow rate of feeding, small bites/sips, effortful swallow, quiet environment (tv off, limit talking, door closed, etc.) and alternating bites and sips     FULL supervision w/ meals. Results reviewed with: patient, daughter Anitha Hebert RN, PCA  Aspiration precautions posted. F/u ST tx: Pt is currently recommended for further skilled SLP services targeting dysphagia therapy in order to maximize oral and pharyngeal swallow skills, while safely supporting PO intake, as well as to improve independent carryover of safe swallow strategies. Plan: Cont to monitor pt on current diet              Trial upgraded textures of dysphagia level 2 and thin liquids as appropriate              May benefit from repeat VFSS if warranted to assess further progress of swallow function from last VFSS dated 3/9/2023   SLP Therapy Minutes   SLP Time In 0800   SLP Time Out 0900   SLP Total Time (minutes) 60   SLP Mode of treatment - Individual (minutes) 60   SLP Mode of treatment - Concurrent (minutes) 0   SLP Mode of treatment - Group (minutes) 0   SLP Mode of treatment - Co-treat (minutes) 0   SLP Mode of Treatment - Total time(minutes) 60 minutes   SLP Cumulative Minutes 150   Therapy Time missed   Time missed?  No

## 2023-07-11 NOTE — PROGRESS NOTES
07/11/23 0700   Pain Assessment   Pain Assessment Tool 0-10   Pain Score 4   Pain Location/Orientation Location: Head   Restrictions/Precautions   Precautions 1:1;Aspiration;Bed/chair alarms;Cognitive; Fall Risk;Impulsive;Pain;Supervision on toilet/commode;Visual deficit   Weight Bearing Restrictions No   ROM Restrictions No   Lifestyle   Autonomy "I used this the right way, but they were too late" referring to call bell   Eating   Comment breakfast completed with SLP this date   Oral Hygiene   Comment requesting to complete oral care after breakfast   Shower/Bathe Self   Type of Assistance Needed Physical assistance;Verbal cues   Physical Assistance Level 26%-50%   Comment sponge bathing routine completed seated at sink with pt bathing all parts with max VCs for attending to Lside of body for thoroughness; min assist in stance for bathing rear. Shower/Bathe Self CARE Score 3   Upper Body Dressing   Type of Assistance Needed Physical assistance;Verbal cues   Physical Assistance Level 51%-75%   Comment min A for threading jaqui, shirt over LUE, max VCs for hernandez dressing tech and attending to L side, able to thread RUE, head, assist to adjust over trunk.    Upper Body Dressing CARE Score 2   Lower Body Dressing   Type of Assistance Needed Physical assistance   Physical Assistance Level 26%-50%   Comment seated to thread underwear/pants over feet, stand with CG/min A for CM; assist for CM over L hip   Lower Body Dressing CARE Score 3   Putting On/Taking Off Footwear   Type of Assistance Needed Physical assistance   Physical Assistance Level 51%-75%   Comment pt able to doff socks, requires assist to don socks/shoes   Putting On/Taking Off Footwear CARE Score 2   Sit to Stand   Type of Assistance Needed Physical assistance;Verbal cues   Physical Assistance Level 26%-50%   Comment with RW; VCs for hand placement   Sit to Stand CARE Score 3   Bed-Chair Transfer   Type of Assistance Needed Physical assistance;Verbal cues Physical Assistance Level 26%-50%   Comment with RW; VCs for RW safety, staying within AYLA   Chair/Bed-to-Chair Transfer CARE Score 3   [address], [address]   Type of Assistance Needed Physical assistance   Physical Assistance Level 26%-50%   Comment pt able to complete bladder hygiene seated, CG for CM down, assist for CM over L hip   [place] Score 3   Toilet Transfer   Type of Assistance Needed Physical assistance;Verbal cues   Physical Assistance Level 26%-50%   Comment with RW to [facility] over toilet; VCs for RW safety/hand placement   Toilet Transfer CARE Score 3   Cognition   Overall Cognitive Status Impaired   Arousal/Participation Alert;Arousable   Attention Attends with cues to redirect   Orientation Level Oriented X4   Memory Decreased short term memory;Decreased recall of recent events;Decreased recall of precautions   Following Commands Follows one step commands with increased time or repetition   Activity Tolerance   Activity Tolerance Patient tolerated treatment well   Assessment   Treatment Assessment pt engages in 60 minute skilled OT Session focusing on sponge bathing routine, func transfers and standing for self care tasks. see above for full func details. pt able to verbalize awareness to L body, however has difficulty during functional tasks especially self care and bimanuel tasks, benefiting from up to max VCs for attending to L side when bathing/UE dressing. pt remains significantly limited by decreased attention to L side, impaired balance, impaired func cog/safety, warranting continued skilled care to focus on ADL retraining, func transfers, standing karishma/bal, LUE NMR, func cog/vision, RW safety, in order to decrease burden of care at d/c. Prognosis Fair   Problem List Decreased strength;Decreased endurance; Impaired balance;Decreased mobility;Pain;Decreased skin integrity   Barriers to Discharge Inaccessible home environment;Decreased caregiver support   Plan Treatment/Interventions ADL retraining;Functional transfer training; Therapeutic exercise; Endurance training;Cognitive reorientation;Patient/family training;Equipment eval/education; Compensatory technique education   OT Therapy Minutes   OT Time In 0700   OT Time Out 0800   OT Total Time (minutes) 60   OT Mode of treatment - Individual (minutes) 60   OT Mode of treatment - Concurrent (minutes) 0   OT Mode of treatment - Group (minutes) 0   OT Mode of treatment - Co-treat (minutes) 0   OT Mode of Treatment - Total time(minutes) 60 minutes   OT Cumulative Minutes 270   Therapy Time missed   Time missed?  No

## 2023-07-11 NOTE — PCC CARE MANAGEMENT
Pt was making good gains and suffered a fall. Pt is now in a tlso and continues with therapy to improve overall functional ability. Pts daughter present and aware of pts current status. Expect dc home later this week or early next with contd [facility] services through [facility].

## 2023-07-11 NOTE — PROGRESS NOTES
Internal Medicine Progress Note  Patient: Uriel Hooper  Age/sex: 70 y.o. female  Medical Record #: [id number]      ASSESSMENT/PLAN: (Interval History)  Uriel Hooper is seen and examined and management for following issues:    Left lateral thigh hematoma   • Stable  • Will watch hemoglobin     ABLA  • S/p 1 unit PRBCs 7/2/23     Thrush  • Continue Nystatin     Chronic combined systolic/diastolic heart failure   • Originally diagnosed 2004 when LVEF then was 10-15% and felt to be NICM  • Her LVEF did recover into 50's then fell when she had her CVA in 2/2023 to 30%  • Also has moderate AI/gr 2 DD  • Sees Dr Jennifer Trevino a [facility] in 2/2023 when LVEF was found to be 30% which is most current  • Had been on Coreg in past but HF team switched her to Toprol when she had her stroke 2/2023  • Her soft BPs have limited starting her on ACEI/ARB as OP  • Home:  Toprol XL 25mg BID/Aldactone 25mg qd  • Here:  Toprol XL 25 mg BID/Aldactone 25mg qd  • On 7/7 night, Toprol and Minipress held; on 7/8 AM Toprol and Aldactone held so reduced hold parameter on Toprol to <100 and Minipress was stopped  • Toprol/Aldactone given last 2 days     Hypercalcemia  • Continue Cinacalcet here as was taking at home     HS hypoxia   • using 4L NC HS at home  • Is using O2 at night but amt not documented.  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  • Has residual left sided weakness  • Had recrudescence of CVA 4/2023 with worsening left facial droop  • Continue Eliquis     Dysphagia  • Had admission for aspiration PNA at [facility] 5/2023 tx'd with Levaquin and Flagyl  • Continue pureed diet with NTL as at home  • Continue Ensure HP pudding TID  • ST following     Bipolar depression  • Home:  Wellbutrin 100mg BID/Cymbalta 60mg BID/Lamictal 150mg qhs  • Here:  Wellbutrin 100mg BID/Cymbalta 60mg BID/Lamictal 150mg qhs     Rheumatoid arthritis  • Continue Plaquenil 200mg BID as at home     Chronic pain/chronic opioid use  • 2/2 RA and lumbar spondylosis  • Pain management per PMR     Hx incidental right superior hypophyseal artery aneurysm  • Follows with NS Dr Teofilo Izquierdo     CRE urine 3/2023  • Maintain contact isolation     Dye allergy   • Was dye prepped on 7/4/23 with Benadryl 50mg IV x 1 w/o issues     Incidental findings  • Pancolitis with increasing inflammatory free fluid in pelvis  • Calcified granuloma RLL     Hypokalemia  • Got Kdur 40 meq x 2 on 7/8/23  • K+ was 4.1 on 7/10/23     Enterococcus UTI  · on Macrobid per Dr Juvenal Guerin = sensitive        Discharge date:  Team       The above assessment and plan was reviewed and updated as determined by my evaluation of the patient on 7/11/2023.     Labs:   Results from last 7 days   Lab Units 07/10/23  0501 07/08/23  0537   WBC Thousand/uL 7.64 6.56   HEMOGLOBIN g/dL 9.3* 9.2*   HEMATOCRIT % 29.7* 29.2*   PLATELETS Thousands/uL 330 279     Results from last 7 days   Lab Units 07/10/23  0501 07/08/23  0537   SODIUM mmol/L 143 144   POTASSIUM mmol/L 4.1 3.2*   CHLORIDE mmol/L 112* 114*   CO2 mmol/L 26 25   BUN mg/dL 18 15   CREATININE mg/dL 0.91 0.88   CALCIUM mg/dL 8.1* 8.3                   Review of Scheduled Meds:  Current Facility-Administered Medications   Medication Dose Route Frequency Provider Last Rate   • acetaminophen  650 mg Oral Q6H Camacho Romo MD     • albuterol  2 puff Inhalation Q6H PRN Berny Juarez MD     • apixaban  5 mg Oral BID Berny Juarez MD     • buPROPion  100 mg Oral BID Berny Juarez MD     • cinacalcet  30 mg Oral Daily With Breakfast Berny Juarez MD     • Diclofenac Sodium  2 g Topical 4x Daily PRN Berny Juarez MD     • DULoxetine  60 mg Oral BID Berny Juarez MD     • gabapentin  400 mg Oral BID Berny Juarez MD     • hydroxychloroquine  200 mg Oral BID With Meals Berny Juarez MD • lamoTRIgine  150 mg Oral HS Valencia Keller MD     • lidocaine  1 patch Topical Daily LAURA Rutledge     • magnesium Oxide  400 mg Oral BID Valencia Keller MD     • melatonin  3 mg Oral HS Valencia Keller MD     • metoprolol succinate  25 mg Oral BID LAURA Sterling     • nitrofurantoin  100 mg Oral BID With Meals Valencia Keller MD     • nystatin  500,000 Units Swish & Spit 4x Daily Valencia Keller MD     • ondansetron  4 mg Oral Q6H PRN Valencia Keller MD     • oxyCODONE  5 mg Oral Q4H PRN Valencia Keller MD     • oxyCODONE  2.5 mg Oral Q4H PRN Valencia Keller MD     • pantoprazole  40 mg Oral Early Morning Valencia Keller MD     • simethicone  80 mg Oral 4x Daily (with meals and at bedtime) LAURA Sterling     • spironolactone  25 mg Oral Daily Valencia Keller MD         Subjective/ HPI: Patient seen and examined. Patients overnight issues or events were reviewed with nursing or staff during rounds or morning huddle session. New or overnight issues include the following:     No new or overnight issues. Offers no complaints    ROS:   A 10 point ROS was performed; negative except as noted above. Imaging:     No orders to display       *Labs /Radiology studies reviewed  *Medications reviewed and reconciled as needed  *Please refer to order section for additional ordered labs studies  *Case discussed with primary attending during morning huddle case rounds    Physical Examination:  Vitals:   Vitals:    07/10/23 2057 07/11/23 0337 07/11/23 0600 07/11/23 0822   BP: 102/69 109/66  138/58   BP Location: Left arm Left arm     Pulse: 89 64  64   Resp: 18 17     Temp: 97.9 °F (36.6 °C) 98.9 °F (37.2 °C)     TempSrc: Axillary Axillary     SpO2: 100% 94%     Weight:   54.8 kg (120 lb 13 oz)    Height:           General Appearance: no distress, conversive  HEENT:  External ear normal.  Nose normal w/o drainage. Mucous membranes are moist. Oropharynx is clear.  Conjunctiva clear w/o icterus or redness. Neck:  Supple, normal ROM  Lungs: BBS without crackles/wheeze/rhonchi; respirations unlabored with normal inspiratory/expiratory effort. No retractions noted. On RA  CV: regular rate and rhythm; no rubs/murmurs/gallops, PMI normal   ABD: Abdomen is soft. Bowel sounds all quadrants. Nontender with no distention. EXT: no edema  Skin: normal turgor, normal texture, no rashes  Psych: affect normal, mood normal  Neuro: AAO     The above physical exam was reviewed and updated as determined by my evaluation of the patient on 7/11/2023. Invasive Devices     Peripheral Intravenous Line  Duration           Long-Dwell Peripheral IV (Midline) 36/26/11 Right Basilic 7 days                   VTE Pharmacologic Prophylaxis: Eliquis  Code Status: Level 1 - Full Code  Current Length of Stay: 4 day(s)      Total time spent:  30 minutes with more than 50% spent counseling/coordinating care. Counseling includes discussion with patient re: progress  and discussion with patient of his/her current medical state/information. Coordination of patient's care was performed in conjunction with primary service. Time invested included review of patient's labs, vitals, and management of their comorbidities with continued monitoring. In addition, this patient was discussed with medical team including physician and advanced extenders. The care of the patient was extensively discussed and appropriate treatment plan was formulated unique for this patient. Medical decision making for the day was made by supervising physician unless otherwise noted in their attestation statement. ** Please Note:  voice to text software may have been used in the creation of this document.  Although proof errors in transcription or interpretation are a potential of such software**

## 2023-07-11 NOTE — PROGRESS NOTES
07/11/23 0900   Pain Assessment   Pain Assessment Tool 0-10   Pain Score 6   Pain Location/Orientation Orientation: Left; Location: Hip;Location: Back   Restrictions/Precautions   Precautions 1:1;Aspiration;Bed/chair alarms;Cognitive; Fall Risk;Pain;Supervision on toilet/commode;Visual deficit   Cognition   Overall Cognitive Status Impaired   Arousal/Participation Alert;Arousable   Attention Attends with cues to redirect   Orientation Level Oriented X4   Memory Decreased short term memory;Decreased recall of recent events;Decreased recall of precautions   Following Commands Follows one step commands with increased time or repetition   Sit to Stand   Type of Assistance Needed Physical assistance   Physical Assistance Level 25% or less   Comment CGA/Fei with RW, verbal cues and occassional hand over hand assistance for placement of LUE   Sit to Stand CARE Score 3   Bed-Chair Transfer   Type of Assistance Needed Physical assistance   Physical Assistance Level 25% or less   Comment CGA/Fei with RW, VC to stay in walker when turning   Chair/Bed-to-Chair Transfer CARE Score 3   Car Transfer   Type of Assistance Needed Physical assistance   Physical Assistance Level 25% or less   Comment CGA/Fei with RW. did not require assistance for management of LEs this session   Car Transfer CARE Score 3   Walk 10 Feet   Type of Assistance Needed Physical assistance   Physical Assistance Level 25% or less   Comment CGA/Fei with RW   Walk 10 Feet CARE Score 3   Walk 50 Feet with Two Turns   Type of Assistance Needed Physical assistance   Physical Assistance Level 25% or less   Comment CGA/Fei with RW   Walk 50 Feet with Two Turns CARE Score 3   Walk 150 Feet   Type of Assistance Needed Physical assistance   Physical Assistance Level 25% or less   Comment CGA/Fei with RW   Walk 150 Feet CARE Score 3   Ambulation   Primary Mode of Locomotion Prior to Admission Walk   Distance Walked (feet) 150 ft  (x2, 2 x 10 ft (inroom mobility)) Assist Device Roller Walker   Gait Pattern Decreased foot clearance; Step to; Step through; Improper weight shift;Decreased L stance;Narrow AYLA; Forward Flexion   Limitations Noted In Balance;Device Management; Heel Strike;Posture; Safety;Speed;Strength;Swing   Provided Assistance with: Balance;Direction   Walk Assist Level Minimum Assist;Contact Guard   Findings CGA/Fei with RW. VC to stay within RW frame when turning   Does the patient walk? 2. Yes   Curb or Single Stair   Style negotiated Curb   Type of Assistance Needed Physical assistance   Physical Assistance Level 25% or less   Comment Fei with RW x2 trials   1 Step (Curb) CARE Score 3   Toilet Transfer   Type of Assistance Needed Physical assistance   Physical Assistance Level 25% or less   Comment CGA/Fei with RW   Toilet Transfer CARE Score 3   Assessment   Treatment Assessment Patient participated in 40 minute skilled physical therapy session focusing on transfers, single step navigation with RW, and gait training training. Patient demonstrated decreased insight into deficits and decreased safety awareness with all functional mobility placing her at high risk for falls. Pt would benefit from continued PT services to work on transfer training, gait training with RW, curb/single step navigation with RW, dynamic balance, and LE strengthening. Family/Caregiver Present daughter, John Webb, present at end of session. Confirmed home set up. Patient lives with son and son's significant other. 1 TAB the home and 1 step to access the bathroom on the first floor. To access bathroom she descend backwards with assist from family members. Daughter is concerned over her mother's fall risk and that patient sometimes goes on "adventures" at night time. Daughter is considering bed alarm or video camera at home. Pt has first floor set up at home. Does not have to access second floor for any reason. Educated provided on physical therapy plan of care.  Daughter would also like to have therapy work on UE strength. The fall that brought patient into the hospital occurred outside somewhere between 2am-6am. amount of time she was down outside is unknown. Problem List Decreased strength;Decreased endurance; Impaired balance;Decreased mobility;Pain;Decreased skin integrity   Barriers to Discharge Inaccessible home environment;Decreased caregiver support   Plan   Treatment/Interventions Functional transfer training;LE strengthening/ROM; Therapeutic exercise; Endurance training;Patient/family training;Bed mobility;Gait training;Equipment eval/education   Progress Progressing toward goals   Recommendation   PT Discharge Recommendation Home with home health rehabilitation   Equipment Recommended Walker   PT Therapy Minutes   PT Time In 0900   PT Time Out 0940   PT Total Time (minutes) 40   PT Mode of treatment - Individual (minutes) 40   PT Mode of treatment - Concurrent (minutes) 0   PT Mode of treatment - Group (minutes) 0   PT Mode of treatment - Co-treat (minutes) 0   PT Mode of Treatment - Total time(minutes) 40 minutes   PT Cumulative Minutes 160   Therapy Time missed   Time missed?  No     Reuben Hussein, PT, DPT

## 2023-07-11 NOTE — PCC OCCUPATIONAL THERAPY
Pt reports PLOF as needing some assistance for bathing/dressing with dtr coming 3x per week to help her shower, having someone with her during grooming tasks, staying on first floor of home and walking with RW though pt does have WC at home for distances. Pt reports having the following DME available at home: WC, RW, grab bars, handheld shower head, and shower chair. Pt reports 3-4 falls at home. Upon evaluation, pt current level of fxn: modA for UB/LB dressing with inc time, Marika with mod VC with RW for functional transfers, Marika for toileting, and Marika for grooming due to the following deficits impacting occupational performance: deconditioning, impaired stand tolerance, impaired standing balance, pain mgmt, dec UE/LE strength, impaired cognition/decreased safety, and Hx of multiple falls and pt self report of fear of falling. Based on OT evaluation, pt demonstrates good rehab potential and would benefit from skilled OT services to reach pt goals of supervision w/ ELOS 2 weeks to return to least restrictive environment pending pt progress with family support and emphasis on caregiver education/resources to decrease pt risk of falls and rehospitalization. Goals for this week: continue to focus on ADL management, func transfers with RW and attention to L body/visual field. D/C date: TBD.     7/18/23 update:  Pt demo's progress since previous assessment with self care tasks and func transfers with RW progressing to overall min A for LE self care (mod A UB), Supervision with RW for func transfers. Pt remains limited due to the following deficits impacting occupational performance: deconditioning, impaired stand tolerance, impaired standing balance, pain mgmt, dec UE/LE strength, impaired cognition/decreased safety, and Hx of multiple falls and pt self report of fear of falling.  Pt demonstrates good rehab potential and would benefit from skilled OT services to reach pt goals of supervision w/ ELOS 2 weeks to return to least restrictive environment pending pt progress with family support and emphasis on caregiver education/resources to decrease pt risk of falls and rehospitalization. Goals for this week: continue to focus on ADL management, func transfers with RW and attention to L body/visual field. D/C date: 7/21/23 home with supportive family and home OT services    7/25/23 update:  Pt noted with slight decline in functional status since prior assessment as pt noted with fall last week and now presents with back pain, TLSO and spinal precautions in addition to current precautions. Pt's current functional status is Max A for UB/LB dressing with use of LHAE, mod A for bathing and Min A for functional transfers with use of RW. Pt remains limited due to the following deficits impacting occupational performance: deconditioning, impaired stand tolerance, impaired standing balance, pain mgmt, dec UE/LE strength, impaired cognition/decreased safety, back pain and Hx of multiple falls and pt self report of fear of falling. Goals for this week: continue to focus on ADL management with LHAE and to maintain spinal precautions w/o VCs, func transfers with RW and attention to L body/visual field.

## 2023-07-11 NOTE — PROGRESS NOTES
PM&R PROGRESS NOTE:  El Max 70 y.o. female MRN: [id number]  Unit/Bed#: -01 Encounter: [id number]        Rehabilitation Diagnosis: Impairment of mobility, safety, Activities of Daily Living (ADLs), and cognitive/communication skills due to Other Disabling Impairments:  13  Other Disabling Impairments    HPI: El Max is a 70 y.o. female with medical history of HTN, CVA in 2/2023 on eliquis (chronic dysphagia, L sided weakness, cognitive impairment), hypercalcemia,  CHF, GERD, SHAR, gout, bipolar disorder, RA on plaquenil, chronic opioid dependence on morphine for chronic back/joint pain, who presented to the [address] [place] [address] [address] on 6/30 after a fall, unable to get up and disoriented. She thinks she tripped and fell down her porch steps, landing on he L thigh. Found to have a L lateral thigh hematoma with evidence of extravasation on imaging. Binder placed over hips, and eliquis was held but not reversed given her recent stroke. Speech consulted and noted oropharyngeal swallow likely at baseline and recommended Level 1/NTL. Recommended for VBS. Course c/b ABLA on 7/2 Hgb dropped to 6.8 and she was given 1 unit pRBC. Geriatrics was consulted, noted at baseline she is oriented but forgetful, with worsening issues since her stroke. Mini-Cog was 9/15 in 2021. They recommended checking B12. They noted she has not been on lorazepam since January, and also recommended weaning down off gabapentin if possible. On 7/4 patient had an unwitnessed fall while trying to reach for her bag on the window sill. No head strike, but landed on her L hip. GCS 15, Neuro exam was baseline. CTA ordered to look for active bleeding, which showed decreased size of hematoma with no active extravasation. Hgb did drop after her transfusion, but seems to have stablized today at 8.5.  That being said, her HR was elevated today, and it appears a troponin has been ordered by the trauma team as well as a CXR. CXR showed hazy RLL opacity, likely 2/2 atelectasis, and trops trended down. Metoprolol was increased with improvement. She was admitted to the [facility] on 7/7. SUBJECTIVE: Patient seen face to face. No acute issues overnight. Acute pain improving, chronic pain managed well on current regimen. Good appetite, voiding, LBM 7/11. Denies chest pain, shortness of breath, fever, chills, N/V, abdominal pain. ASSESSMENT: Stable, progressing    PLAN:  - UTI: continue Macrobid thru 7/14, monitor for confusion  - sundowning: frequent checks, floor mats, bed alarms on loudest audible setting overnight  - thrush- improving, continue Nystatin   - acute pain- improving, continue with current regimen  - labs 7/13- monitor hypokalemia, anemia    Rehabilitation  • Functional deficits:  cognitive impairment, self-care, impaired mobility  • Continue current rehabilitation plan of care to maximize function. • Functional update:   o PT: mobility- min A, transfers- min A, ambulation- min A RW 50', steps 2  o OT: ADL: bathing- min A, dressing- UB mod-max A LB mod-max A, footwear- min-mod A, toileting- min A  o SLP: dysphagia Level 1/NTL  • Estimated Discharge: anticipated 10-14 days      Pain  • Tylenol 975 mg every 8 hours  • Gabapentin 400 mg BID  • Diclofenac Sodium topical gel QID prn  • Oxycodone 2.5-5 mg every 4 hours prn    DVT prophylaxis  • Eliquis    Bladder plan  • Continent    Bowel plan  • Continent      * Leg hematoma, left, initial encounter  Assessment & Plan  2/2 fall    6/30 CT CAP:  Large hematoma in the lateral left thigh measuring up to 9.6 cm with focus of active contrast extravasation. No acute intrathoracic or intra-abdominal injury. 7/4 CTA Pelvis:  1. Decreasing size of left lateral thigh hematoma without evidence for active extravasation. 2.  No CT findings in the visualized pelvis and thighs to explain falling hematocrit. 3.  Pancolitis with increasing inflammatory free fluid in the pelvis. No free air, pneumatosis, or encapsulated collections demonstrated. Non-operatively managed. Did require transfusion x1  Pain management as below  Monitor Hgb. PT/OT 3-5 hours/day, 5-7 days/week  Outpatient f/u with General Surgery      History of stroke  Assessment & Plan  MRI brain 2/22 - 1.  New small acute /more recent interval lacunar infarction in the right cerebral peduncle (series 4, image 14). 2.  Extensive multifocal large, previously present right middle cerebral artery infarction which is evolving with superimposed areas of evolving hemorrhage likely related to hemorrhagic transformation of subacute right MCA infarction.  No significant   mass effect. 3.  Mild, chronic microangiopathy. S/P thrombectomy 2/12 by Dr Janae Maldonado   Secondary stroke prophylaxis at this time with Eliquis 5 mg twice daily   PT, OT, SLP. Review modifiable risk factors and provide stroke education  Monitor for poststroke pain  Eliquis for secondary stroke prophylaxis    Outpatient f/u with Neurology    CHF (congestive heart failure) ([address])  Assessment & Plan  Wt Readings from Last 3 Encounters:   07/11/23 54.8 kg (120 lb 13 oz)   06/30/23 55.3 kg (122 lb)   06/06/23 47.9 kg (105 lb 9.6 oz)     2/2023 Echo 30% with G2DD, moderate AI  Home: Toprol XL 25mg BID, Aldactone 25mg daily   - Limited by BP - no ACE/ARB   - no SGLT2i due to recurrent UTI  Here: Toprol XL 25mg BID, spironolactone 25mg daily   Unclear etiology of cardiomyopathy  Possible consideration for ischemic work-up outpatient, poor candidate for aggressive procedures   Monitor I/O/daily weights, volume status, lytes. IM consulted to assist with management  Outpatient f/u with Dr. Martina Chanel      Dysphagia  Assessment & Plan  2/2 previous CVA earlier this year.   Noted to have impaired oropharyngeal swallow  Was on free water protocol at home - holding for now due to thrush  On Level 1/NTL  - Inpatient SLP recommended VBS  - Consulting SLP here  - Aspiration precautions  - Strict oral hygiene prior to trials    Urinary tract infection without hematuria  Assessment & Plan  Present on admission  With incontinence and confusion  Grew 100k CFU Enterococcus    - Started on Macrobid based on antibiogram on 7/9   - sensitivity confirms susceptibility    Mixed stress and urge urinary incontinence  Assessment & Plan  Frankly incontinent here  Initiate timed voids and scans  Has history of recurrent UTIs - will check UA on admission here given extent of incontinence   - Had confusion on 7/8-7/9 overnight. - See UTI for details. Close continence care      Anemia  Assessment & Plan  2/2 L leg hematoma (most recent imaging negative for extravasation)  At home on eliquis  No reversal inpatient due to recent CVA  Prior to admission Hgb 10-12. Ravi 7/2 6.8 - given 1 unit pRBC  7/8 Hgb 8.5 > 9.2 > 9.3  Monitor, transfuse as appropriate. IM consulted to assist with management    Sundowning  Assessment & Plan  1 episode happened overnight into 7/9  - More confusion, than agitation  - Wandered all the way down to the cafeteria on the 1st floor  - No injuries  - Brought back to her room  - UCx with 100k enterococcus. - treat UTI  - Low bed, Virtual sitter - may be able to de-escalate to overnight only. - Floor mats. - Delirium precautions    Night terrors  Assessment & Plan  Home: Prazosin  Here: Prazosin  Continue melatonin  Outpatient f/u with PCP    Thrush  Assessment & Plan  Noted on admission  7/7 started on nystatin swish/spit  - 7/11 improving    Hypomagnesemia  Assessment & Plan  Home: Mag oxide 800mg BID  Here: Mag oxide 400mg BID  7/9 Mag 1.9   Goals > 2. Sinus tachycardia  Assessment & Plan  Home: Toprol XL 25mg BID  Here: Same  On 7/6 with HR in 115-120. EKG showed sinus tach. Trops trended and negative.    - Associated with transient/mild SOB   - She was only being given Toprol daily in the hospital   - Increased to BID with improvement  Lytes with K > 4, Mag > 2  Monitor and adjust regimen as appropriate  IM consulted to assist with management  Outpatient f/u with Dr. Anthony Conner    Obstructive sleep apnea  Assessment & Plan  Previously non-compliant with hospital or home CPAP  Outpatient f/u with PCP. Hypercalcemia  Assessment & Plan  Suspect 2/2 primary hyperparathyroidism  7/8 iCal 1.12, Phos 3.3  Home: Sensipar 30mg daily  Here: Same  Recommend outpatient f/u with Endocrinology      HTN (hypertension)  Assessment & Plan  Tends to run on the low/normal side. Home: Toprol XL 25mg BID, Spironolactone 25mg daily  Here: Same  Has not been able to get meds at times due to not meeting parameters. Monitor and adjust as appropriate  IM consulted to assist with management    Bipolar depression [facility])  Assessment & Plan  Home: Bupropion 100mg BID, Cymbalta 60mg BID, Ativan 0.25mg BID PRN (however this has not been filled since January 2023 as per PDMP)  Here: Bupropion 100mg BID, Cymbalta 60mg BID  Consulting rehab psychology for support  Adjust as appropriate  Outpatient f/u with PCP    Severe protein-calorie malnutrition ([address])  Assessment & Plan  Malnutrition Findings:                                 BMI Findings: Body mass index is 20.89 kg/m². Consulted nutrition    Osteoporosis  Assessment & Plan  Hx L1 compression fx s/p kyphoplasty  Was previously on Prolia - last given in 2020   - Held due to dental issues  Recommend outpatient f/u with Endocrinology  2/23 Vitamin D level is 41.7    Gastroesophageal reflux disease without esophagitis  Assessment & Plan  Home: PPI daily  Here: PPI daily    Seronegative arthropathy of multiple sites [facility])  Assessment & Plan  Follows with Dr. Leopoldo Espinoza with Rheum and is on Hydroxychloroquine 200mg BID.   Continue home regimen and follow-up with rheumatology on discharge    Mild intermittent asthma without complication  Assessment & Plan  No acute exacerbation  Home: Albuterol PRN  Here: Same    Chronic pain syndrome  Assessment & Plan  Joint and back pain  Follows with Dr. Ena Reid with [facility]  Reviewed PDMP - at home on MSIR 7.5mg BID PRN. Here: Oxycodone 2.5-5mg PRN. - Possible transition back to home meds  - Outpatient f/u with Dr. Ena Reid. Appreciate IM consultants medical co-management. Personally reviewed labs, medications, and imaging. ROS:  Review of Systems   A 10 point review of systems was negative except for what is noted in the HPI. OBJECTIVE:   /64 (BP Location: Left arm)   Pulse 65   Temp 98.7 °F (37.1 °C) (Oral)   Resp 18   Ht 5' 3" (1.6 m)   Wt 54.8 kg (120 lb 13 oz)   SpO2 95%   BMI 21.40 kg/m²     Physical Exam  Constitutional:       Appearance: Normal appearance. HENT:      Head: Normocephalic and atraumatic. Nose: Nose normal.      Mouth/Throat:      Mouth: Mucous membranes are moist.   Cardiovascular:      Rate and Rhythm: Normal rate and regular rhythm. Pulses: Normal pulses. Pulmonary:      Effort: Pulmonary effort is normal.      Breath sounds: Normal breath sounds. Abdominal:      General: Bowel sounds are normal.      Palpations: Abdomen is soft. Musculoskeletal:         General: Normal range of motion. Cervical back: Normal range of motion. Comments: kyphosis   Skin:     General: Skin is warm and dry. Capillary Refill: Capillary refill takes less than 2 seconds. Findings: Bruising present. Neurological:      Mental Status: She is alert. Motor: Weakness present.    Psychiatric:         Mood and Affect: Mood normal.        Lab Results   Component Value Date    WBC 7.64 07/10/2023    HGB 9.3 (L) 07/10/2023    HCT 29.7 (L) 07/10/2023    MCV 98 07/10/2023     07/10/2023     Lab Results   Component Value Date    SODIUM 143 07/10/2023    K 4.1 07/10/2023     (H) 07/10/2023    CO2 26 07/10/2023    BUN 18 07/10/2023    CREATININE 0.91 07/10/2023    GLUC 81 07/10/2023    CALCIUM 8.1 (L) 07/10/2023     Lab Results   Component Value Date    INR 1.33 (H) 07/01/2023    INR 1.65 (H) 06/30/2023    INR 1.66 (H) 04/29/2023    PROTIME 16.7 (H) 07/01/2023    PROTIME 19.5 (H) 06/30/2023    PROTIME 19.9 (H) 04/29/2023           Current Facility-Administered Medications:   •  acetaminophen (TYLENOL) tablet 650 mg, 650 mg, Oral, Q6H [address], Nancy Villegas MD, 650 mg at 07/11/23 1235  •  albuterol (PROVENTIL HFA,VENTOLIN HFA) inhaler 2 puff, 2 puff, Inhalation, Q6H PRN, Nancy Villegas MD  •  apixaban (ELIQUIS) tablet 5 mg, 5 mg, Oral, BID, Nancy Villegas MD, 5 mg at 07/11/23 0414  •  buPROPion [facility]) tablet 100 mg, 100 mg, Oral, BID, Nancy Villegas MD, 100 mg at 07/11/23 1530  •  cinacalcet (SENSIPAR) tablet 30 mg, 30 mg, Oral, Daily With Breakfast, Nancy Villegas MD, 30 mg at 07/11/23 1222  •  Diclofenac Sodium (VOLTAREN) 1 % topical gel 2 g, 2 g, Topical, 4x Daily PRN, Nancy Villegas MD  •  DULoxetine (CYMBALTA) delayed release capsule 60 mg, 60 mg, Oral, BID, Nancy Villegas MD, 60 mg at 07/11/23 8510  •  gabapentin (NEURONTIN) capsule 400 mg, 400 mg, Oral, BID, Nancy Villegas MD, 400 mg at 07/11/23 4818  •  hydroxychloroquine (PLAQUENIL) tablet 200 mg, 200 mg, Oral, BID With Meals, Nancy Villegas MD, 200 mg at 07/11/23 8202  •  lamoTRIgine (LaMICtal) tablet 150 mg, 150 mg, Oral, HS, Nancy Villegas MD, 150 mg at 07/10/23 1294  •  lidocaine (LIDODERM) 5 % patch 1 patch, 1 patch, Topical, Daily, LAURA Rutledge, 1 patch at 07/11/23 0820  •  magnesium Oxide (MAG-OX) tablet 400 mg, 400 mg, Oral, BID, Nancy Villegas MD, 400 mg at 07/11/23 2779  •  melatonin tablet 3 mg, 3 mg, Oral, HS, Nancy Villegas MD, 3 mg at 07/10/23 2107  •  metoprolol succinate (TOPROL-XL) 24 hr tablet 25 mg, 25 mg, Oral, BID, LAURA Lopez, 25 mg at 07/11/23 7681  •  nitrofurantoin (MACROBID) extended-release capsule 100 mg, 100 mg, Oral, BID With Meals, Nancy Villegas MD, 100 mg at 07/11/23 0656  •  nystatin (MYCOSTATIN) oral suspension 500,000 Units, 500,000 Units, Swish & Spit, 4x Daily, Lloyd Welsh MD, 500,000 Units at 07/11/23 1235  •  ondansetron (ZOFRAN-ODT) dispersible tablet 4 mg, 4 mg, Oral, Q6H PRN, Lloyd Welsh MD, 4 mg at 07/09/23 1431  •  oxyCODONE (ROXICODONE) IR tablet 5 mg, 5 mg, Oral, Q4H PRN, Lloyd Welsh MD, 5 mg at 07/11/23 1236  •  oxyCODONE (ROXICODONE) split tablet 2.5 mg, 2.5 mg, Oral, Q4H PRN, Lloyd Welsh MD, 2.5 mg at 07/10/23 1438  •  pantoprazole (PROTONIX) EC tablet 40 mg, 40 mg, Oral, Early Morning, Lloyd Welsh MD, 40 mg at 07/11/23 0510  •  simethicone (MYLICON) chewable tablet 80 mg, 80 mg, Oral, 4x Daily (with meals and at bedtime), LAURA Gallo, 80 mg at 07/11/23 1235  •  spironolactone (ALDACTONE) tablet 25 mg, 25 mg, Oral, Daily, Lloyd Welsh MD, 25 mg at 07/11/23 8403    Past Medical History:   Diagnosis Date   • Anxiety    • Asthma    • Bipolar depression ([address])    • Chronic narcotic dependence ([address])    • Chronic pain    • Depression    • Enterovirus heart infection    • SHAR (obstructive sleep apnea)    • Osteoarthritis    • Peripheral neuropathy    • Plantar fasciitis of right foot    • Senile osteoporosis    • Seronegative arthropathy of multiple sites [facility])    • Undifferentiated connective tissue disease ([address])        Patient Active Problem List    Diagnosis Date Noted   • Leg hematoma, left, initial encounter 07/01/2023   • History of stroke 02/28/2023   • CHF (congestive heart failure) ([address])    • Dysphagia 03/09/2023   • Urinary tract infection without hematuria 03/06/2023   • Mixed stress and urge urinary incontinence    • Anemia 06/26/2021   • Sundowning 07/09/2023   • Thrush 07/07/2023   • Night terrors 07/07/2023   • Cerebral aneurysm, nonruptured 05/15/2023   • Nocturnal hypoxia 05/01/2023   • Stroke-like symptoms 05/01/2023   • Insomnia 04/03/2023   • Hypomagnesemia 03/30/2023   • Sinus tachycardia 03/29/2023   • Hypokalemia 03/10/2023   • AMS (altered mental status) 03/09/2023   • Gout of foot 03/09/2023   • Wound of left ankle 03/09/2023   • Obstructive sleep apnea 02/28/2023   • History of bacteremia 02/24/2023   • Acute CVA (cerebrovascular accident) ([address]) 02/23/2023   • Hypercalcemia 02/22/2023   • Discoloration of skin of foot 02/22/2023   • Abdominal pain 02/22/2023   • HFrEF (heart failure with reduced ejection fraction) ([address]) 02/13/2023   • Stroke ([address]) 02/12/2023   • HTN (hypertension) 02/12/2023   • Cardiomyopathy ([address]) 02/07/2023   • Vitamin D insufficiency 02/07/2023   • LAILA (acute kidney injury) ([address]) 04/23/2022   • Polypharmacy 04/23/2022   • 'light-for-dates' infant with signs of fetal malnutrition 04/22/2022   • Bipolar depression ([address]) 03/04/2022   • Rheumatoid arthritis of multiple sites with negative rheumatoid factor ([address]) 03/04/2022   • Closed fracture of multiple ribs of right side 03/02/2022   • Traumatic pneumothorax 03/02/2022   • Closed fracture of transverse process of lumbar vertebra ([address]) 03/02/2022   • Liver contusion 03/02/2022   • Fall 03/02/2022   • Acute pain due to trauma 03/02/2022   • Right shoulder pain 03/02/2022   • Severe protein-calorie malnutrition ([address]) 03/02/2022   • Undifferentiated connective tissue disease ([address]) 12/29/2021   • Primary generalized (osteo)arthritis 12/29/2021   • Peripheral neuropathy 12/29/2021   • Osteoporosis 12/29/2021   • Hx of compression fracture of spine 12/29/2021   • Lumbar spondylosis 12/29/2021   • Cervical spondylosis 12/29/2021   • Chronic pain syndrome    • Mild intermittent asthma without complication    • Ambulatory dysfunction    • Seronegative arthropathy of multiple sites ([address])    • Gastroesophageal reflux disease without esophagitis         LAURA Miller  [facility]

## 2023-07-12 PROCEDURE — 97530 THERAPEUTIC ACTIVITIES: CPT

## 2023-07-12 PROCEDURE — 99232 SBSQ HOSP IP/OBS MODERATE 35: CPT | Performed by: INTERNAL MEDICINE

## 2023-07-12 PROCEDURE — 97535 SELF CARE MNGMENT TRAINING: CPT

## 2023-07-12 PROCEDURE — 97110 THERAPEUTIC EXERCISES: CPT

## 2023-07-12 PROCEDURE — 99233 SBSQ HOSP IP/OBS HIGH 50: CPT | Performed by: PHYSICAL MEDICINE & REHABILITATION

## 2023-07-12 PROCEDURE — 92526 ORAL FUNCTION THERAPY: CPT

## 2023-07-12 RX ADMIN — MAGNESIUM OXIDE TAB 400 MG (241.3 MG ELEMENTAL MG) 400 MG: 400 (241.3 MG) TAB at 10:40

## 2023-07-12 RX ADMIN — SIMETHICONE 80 MG: 80 TABLET, CHEWABLE ORAL at 06:57

## 2023-07-12 RX ADMIN — NITROFURANTOIN (MONOHYDRATE/MACROCRYSTALS) 100 MG: 75; 25 CAPSULE ORAL at 06:58

## 2023-07-12 RX ADMIN — MAGNESIUM OXIDE TAB 400 MG (241.3 MG ELEMENTAL MG) 400 MG: 400 (241.3 MG) TAB at 17:01

## 2023-07-12 RX ADMIN — NYSTATIN 500000 UNITS: 100000 SUSPENSION ORAL at 16:59

## 2023-07-12 RX ADMIN — CINACALCET 30 MG: 30 TABLET, FILM COATED ORAL at 06:58

## 2023-07-12 RX ADMIN — LIDOCAINE 5% 1 PATCH: 700 PATCH TOPICAL at 10:38

## 2023-07-12 RX ADMIN — LAMOTRIGINE 150 MG: 100 TABLET ORAL at 21:30

## 2023-07-12 RX ADMIN — NITROFURANTOIN (MONOHYDRATE/MACROCRYSTALS) 100 MG: 75; 25 CAPSULE ORAL at 16:59

## 2023-07-12 RX ADMIN — SIMETHICONE 80 MG: 80 TABLET, CHEWABLE ORAL at 16:57

## 2023-07-12 RX ADMIN — ACETAMINOPHEN 650 MG: 325 TABLET, FILM COATED ORAL at 01:03

## 2023-07-12 RX ADMIN — DULOXETINE HYDROCHLORIDE 60 MG: 60 CAPSULE, DELAYED RELEASE ORAL at 10:39

## 2023-07-12 RX ADMIN — METOPROLOL SUCCINATE 25 MG: 25 TABLET, FILM COATED, EXTENDED RELEASE ORAL at 10:39

## 2023-07-12 RX ADMIN — NYSTATIN 500000 UNITS: 100000 SUSPENSION ORAL at 10:40

## 2023-07-12 RX ADMIN — HYDROXYCHLOROQUINE SULFATE 200 MG: 200 TABLET ORAL at 16:58

## 2023-07-12 RX ADMIN — ACETAMINOPHEN 650 MG: 325 TABLET, FILM COATED ORAL at 05:33

## 2023-07-12 RX ADMIN — OXYCODONE HYDROCHLORIDE 5 MG: 5 TABLET ORAL at 07:38

## 2023-07-12 RX ADMIN — DULOXETINE HYDROCHLORIDE 60 MG: 60 CAPSULE, DELAYED RELEASE ORAL at 17:00

## 2023-07-12 RX ADMIN — SIMETHICONE 80 MG: 80 TABLET, CHEWABLE ORAL at 11:58

## 2023-07-12 RX ADMIN — NYSTATIN 500000 UNITS: 100000 SUSPENSION ORAL at 21:30

## 2023-07-12 RX ADMIN — GABAPENTIN 400 MG: 400 CAPSULE ORAL at 10:39

## 2023-07-12 RX ADMIN — PANTOPRAZOLE SODIUM 40 MG: 40 TABLET, DELAYED RELEASE ORAL at 05:33

## 2023-07-12 RX ADMIN — APIXABAN 5 MG: 5 TABLET, FILM COATED ORAL at 17:00

## 2023-07-12 RX ADMIN — SIMETHICONE 80 MG: 80 TABLET, CHEWABLE ORAL at 21:30

## 2023-07-12 RX ADMIN — BUPROPION HYDROCHLORIDE 100 MG: 100 TABLET, FILM COATED ORAL at 10:41

## 2023-07-12 RX ADMIN — ACETAMINOPHEN 650 MG: 325 TABLET, FILM COATED ORAL at 17:02

## 2023-07-12 RX ADMIN — GABAPENTIN 400 MG: 400 CAPSULE ORAL at 17:00

## 2023-07-12 RX ADMIN — OXYCODONE HYDROCHLORIDE 5 MG: 5 TABLET ORAL at 20:48

## 2023-07-12 RX ADMIN — SPIRONOLACTONE 25 MG: 25 TABLET ORAL at 10:41

## 2023-07-12 RX ADMIN — HYDROXYCHLOROQUINE SULFATE 200 MG: 200 TABLET ORAL at 06:58

## 2023-07-12 RX ADMIN — MELATONIN TAB 3 MG 3 MG: 3 TAB at 21:40

## 2023-07-12 RX ADMIN — APIXABAN 5 MG: 5 TABLET, FILM COATED ORAL at 10:40

## 2023-07-12 RX ADMIN — ACETAMINOPHEN 650 MG: 325 TABLET, FILM COATED ORAL at 11:58

## 2023-07-12 RX ADMIN — BUPROPION HYDROCHLORIDE 100 MG: 100 TABLET, FILM COATED ORAL at 17:03

## 2023-07-12 RX ADMIN — NYSTATIN 500000 UNITS: 100000 SUSPENSION ORAL at 13:17

## 2023-07-12 NOTE — PCC NURSING
70 y.o. female with medical history of HTN, CVA in 2/2023 on eliquis (chronic dysphagia, L sided weakness, cognitive impairment), hypercalcemia,  CHF, GERD, SHAR, gout, bipolar disorder, RA on plaquenil, chronic opioid dependence on morphine for chronic back/joint pain, who presented to the [address] [place] [address] [address] on 6/30 after a fall, unable to get up and disoriented. She thinks she tripped and fell down her porch steps, landing on he L thigh. Found to have a L lateral thigh hematoma with evidence of extravasation on imaging. Binder placed over hips, and eliquis was held but not reversed given her recent stroke. Speech consulted and noted oropharyngeal swallow likely at baseline and recommended Level 1/NTL. Recommended for VBS. Course c/b ABLA on 7/2 Hgb dropped to 6.8 and she was given 1 unit pRBC. Geriatrics was consulted, noted at baseline she is oriented but forgetful, with worsening issues since her stroke. Mini-Cog was 9/15 in 2021. They recommended checking B12. They noted she has not been on lorazepam since January, and also recommended weaning down off gabapentin if possible. On 7/4 patient had an unwitnessed fall while trying to reach for her bag on the window sill. No head strike, but landed on her L hip. GCS 15, Neuro exam was baseline. CTA ordered to look for active bleeding, which showed decreased size of hematoma with no active extravasation. Hgb did drop after her transfusion, but seems to have stablized today at 8.5. That being said, her HR was elevated today, and it appears a troponin has been ordered by the trauma team as well as a CXR. CXR showed hazy RLL opacity, likely 2/2 atelectasis, and trops trended down. Metoprolol was increased with improvement. She was admitted to the [facility] on 7/7. This week we will continue to monitor labs and vital signs. WE will educate pt/family about repositioning to prevent skin breakdown.   We will assist w repositioning and perform routine skin checks. We will monitor for adequate pain control. We will encourage independence with ADLs. We will increase safety awareness and keep pt free from falls. We will monitor I/O to maintain adequate fluid balance. 07/19/23 - Pt continues to require alarms and fall mats for safety. Pt is impulsive at night and forgets to use call bell. We will continues to provide frequent rounding and education to the patient for safety and risk of falls. Pt requires vital sign monitoring and blood work for infection prevention and cardiac history. Pt's pain has been controlled on current regime with exception of some breakthrough pain at night. Pt requires assistance with ambulating to and from the bathroom and positioning in bed due to her hyperextended neck. Pt has hx of SHRA but does not use a CPAP so 2L of O2 is used at night for comfort. Pt's diet is Dysphagia 2 with  nectar thick liquids and full supervision during meals. Pt is currently NPO for a swallow study scheduled for tomorrow. Pt takes pills whole in pudding. Both legs are wrapped in ACE wraps during the day and pt requires assistance from staff to do this. We will continue to monitor the patient for safety awareness and keep the pt free from falls. 7/26/23- pt continues to be 1:1 continual observation for impulsivity, cog deficits and safety. Pt reports adequate pain mgmt with current regimen. She continues to require monitoring VS, Skin checks and labs. She requires assistance with T&R, maintaining TLSO brace when OOB and >45 degrees. Pt requires supervision and setup w/meals and  maintain adequate po intake. She requires assistance with administering meds in pudding She is voiding continently w/o difficulty. Her LBM was 7/24 w/o difficulty. Safety and fall precautions are maintained and reinforced.

## 2023-07-12 NOTE — TEAM CONFERENCE
Acute RehabilitationTeam Conference Note  Date: 7/12/2023   Time: 11:18 AM       Patient Name:  Lawrence Ortega       Medical Record Number: [id number]   YOB: 1955  Sex: Female          Room/Bed:  [facility]0/[facility]0-01  Payor Info:  Payor: MEDICARE / Plan: MEDICARE A AND B / Product Type: Medicare A & B Fee for Service /      Admitting Diagnosis: Hematoma of leg, left, initial encounter [S80.12XA]  Hematoma of left hip [S70.02XA]   Admit Date/Time:  7/7/2023 11:57 AM  Admission Comments: No comment available     Primary Diagnosis:  Leg hematoma, left, initial encounter  Principal Problem: Leg hematoma, left, initial encounter    Patient Active Problem List    Diagnosis Date Noted   • Cervical dystonia 07/11/2023   • Sundowning 07/09/2023   • Thrush 07/07/2023   • Night terrors 07/07/2023   • Leg hematoma, left, initial encounter 07/01/2023   • Cerebral aneurysm, nonruptured 05/15/2023   • Nocturnal hypoxia 05/01/2023   • Stroke-like symptoms 05/01/2023   • Insomnia 04/03/2023   • Hypomagnesemia 03/30/2023   • Sinus tachycardia 03/29/2023   • Hypokalemia 03/10/2023   • AMS (altered mental status) 03/09/2023   • Gout of foot 03/09/2023   • Dysphagia 03/09/2023   • Wound of left ankle 03/09/2023   • Urinary tract infection without hematuria 03/06/2023   • Obstructive sleep apnea 02/28/2023   • History of stroke 02/28/2023   • History of bacteremia 02/24/2023   • Acute CVA (cerebrovascular accident) ([address]) 02/23/2023   • Hypercalcemia 02/22/2023   • Discoloration of skin of foot 02/22/2023   • Abdominal pain 02/22/2023   • HFrEF (heart failure with reduced ejection fraction) ([address]) 02/13/2023   • Stroke ([address]) 02/12/2023   • HTN (hypertension) 02/12/2023   • Cardiomyopathy ([address]) 02/07/2023   • Vitamin D insufficiency 02/07/2023   • LAILA (acute kidney injury) ([address]) 04/23/2022   • Polypharmacy 04/23/2022   • 'light-for-dates' infant with signs of fetal malnutrition 04/22/2022   • Bipolar depression ([address]) 03/04/2022   • Rheumatoid arthritis of multiple sites with negative rheumatoid factor ([address]) 03/04/2022   • Closed fracture of multiple ribs of right side 03/02/2022   • Traumatic pneumothorax 03/02/2022   • Closed fracture of transverse process of lumbar vertebra ([address]) 03/02/2022   • Liver contusion 03/02/2022   • Fall 03/02/2022   • Acute pain due to trauma 03/02/2022   • Right shoulder pain 03/02/2022   • Severe protein-calorie malnutrition ([address]) 03/02/2022   • Undifferentiated connective tissue disease ([address]) 12/29/2021   • Primary generalized (osteo)arthritis 12/29/2021   • Peripheral neuropathy 12/29/2021   • Osteoporosis 12/29/2021   • Hx of compression fracture of spine 12/29/2021   • Lumbar spondylosis 12/29/2021   • Cervical spondylosis 12/29/2021   • Anemia 06/26/2021   • Chronic pain syndrome    • Mild intermittent asthma without complication    • Ambulatory dysfunction    • Seronegative arthropathy of multiple sites ([address])    • Gastroesophageal reflux disease without esophagitis    • Mixed stress and urge urinary incontinence    • CHF (congestive heart failure) ([address])        Physical Therapy:    Weight Bearing Status: Full Weight Bearing  Transfers: Minimal Assistance  Bed Mobility: Minimal Assistance  Amulation Distance (ft): 130 feet  Ambulation: Minimal Assistance  Assistive Device for Ambulation: Roller Walker  Wheelchair Mobility Distance:  (n/a)  Wheelchair Mobility:  (n/a)  Number of Stairs: 4  Assistive Device for Stairs: Bilateral [employer]  Stair Assistance: Minimal Assistance  Ramp:  (Min-ModA)  Discharge Recommendations:  (subacute rehab vs home pending patient progress)    Date: 7/12/2023  Date of Evaluation: 7/10/2023  Estimated Length of Stay: 10-14 days     Barriers to Discharge Home: decreased caregiver support, cognition, high fall risk, decreased righting reactions, inaccessible home environment (1 TAB)  PT Interventions to address Barriers: bed mobility, transfer training, gait training, higher level dynamic balance, single step/curb navigation. Equipments Needs: pt reports having RW at home. PT Plan of Care for the Week: gait training with RW, transfer training, LE strengthening, higher level dynamic balance   Family Training Needed: TBD pending patient progress   Discharge Planning: home health PT vs subacute rehab      Occupational Therapy:  Eating: Supervision  Grooming: Minimal Assistance (in stance at sink)  Bathing: Minimal Assistance  Bathing: Minimal Assistance  Upper Body Dressing: Moderate Assistance  Lower Body Dressing: Moderate Assistance  Toileting: Minimal Assistance  Toilet Transfer: Minimal Assistance  Cognition: Exceptions to WNL  Cognition: Decreased Memory, Decreased Executive Functions, Decreased Attention, Decreased Comprehension, Decreased Safety, Impulsive  Orientation: Person, Place, Situation       Pt reports PLOF as needing some assistance for bathing/dressing with dtr coming 3x per week to help her shower, having someone with her during grooming tasks, staying on first floor of home and walking with RW though pt does have WC at home for distances. Pt reports having the following DME available at home: WC, RW, grab bars, handheld shower head, and shower chair. Pt reports 3-4 falls at home. Upon evaluation, pt current level of fxn: modA for UB/LB dressing with inc time, Marika with mod VC with RW for functional transfers, Marika for toileting, and Marika for grooming due to the following deficits impacting occupational performance: deconditioning, impaired stand tolerance, impaired standing balance, pain mgmt, dec UE/LE strength, impaired cognition/decreased safety, and Hx of multiple falls and pt self report of fear of falling.   Based on OT evaluation, pt demonstrates good rehab potential and would benefit from skilled OT services to reach pt goals of supervision w/ ELOS 2 weeks to return to least restrictive environment pending pt progress with family support and emphasis on caregiver education/resources to decrease pt risk of falls and rehospitalization. Goals for this week: continue to focus on ADL management, func transfers with RW and attention to L body/visual field. D/C date: TBD. Speech Therapy:  Mode of Communication: Verbal  Cognition: Exceptions to WNL  Cognition: Decreased Memory, Decreased Executive Functions, Decreased Attention, Decreased Comprehension, Decreased Safety  Orientation: Person, Place, Situation, Time  Swallowing: Exceptions to WNL  Swallowing: Oral Dysphagia, Pharyngeal Dysphagia, Esophageal Dysphagia, Aspiration Risk, Reflux Precautions  Diet Recommendations: Level 1/Puree, [place] Thick  Discharge Recommendations: Home with:  [facility] with[de-identified] 24 Hour Supervision, 24 Hour Assisteance, Family Support, Home Speech Therapy, Outpatient Speech Therapy  Pt completed beside swallow assessment on initial eval. Pt known to this unit from previous admission. Pt with baseline dysphagia- diet at home puree and NTL. Currently, pt presenting with  moderate oral and pharyngeal dysphagia. Symptoms or concerns included trace anterior loss of thins, slow manipulation of puree, prolonged mastication of level 2 trials, reduced bolus formation, suspected reduced bolus control, slow and at times labored transfers, suspected pharyngeal swallow delay, suspected reduced hyolaryngeal elevation, secondary swallows, audible swallows and presence of frequent throat clearing and coughing across meal. Recommendations are for cont'd puree and NTL at this time- frequent oral care given mild thrush which is improving. Plan to cont to assess at bedside and possibly complete repeat VFSS as last one (to this department knowledge) was completed on [facility] in March 2023. In regards to cognition, pt with baseline mild cognitive deficits with sundowning behaviors. Formal cognitive assessment to be completed in future sessions.  Pt will cont to benefit from skilled SLP services targeting cognitive linguistic communication skills for increased independence and decreased burden of care at this time. Nursing Notes:  Appetite: Fair  Diet Type: Dysphagia I, Nectar thick liquids                                                                     Pain Location/Orientation: Orientation: Bilateral, Location: Leg  Pain Score: 5                       Hospital Pain Intervention(s): Shower/Bath          70 y.o. female with medical history of HTN, CVA in 2/2023 on eliquis (chronic dysphagia, L sided weakness, cognitive impairment), hypercalcemia,  CHF, GERD, SHAR, gout, bipolar disorder, RA on plaquenil, chronic opioid dependence on morphine for chronic back/joint pain, who presented to the [address] [place] [address] [address] on 6/30 after a fall, unable to get up and disoriented. She thinks she tripped and fell down her porch steps, landing on he L thigh. Found to have a L lateral thigh hematoma with evidence of extravasation on imaging. Binder placed over hips, and eliquis was held but not reversed given her recent stroke. Speech consulted and noted oropharyngeal swallow likely at baseline and recommended Level 1/NTL. Recommended for VBS. Course c/b ABLA on 7/2 Hgb dropped to 6.8 and she was given 1 unit pRBC. Geriatrics was consulted, noted at baseline she is oriented but forgetful, with worsening issues since her stroke. Mini-Cog was 9/15 in 2021. They recommended checking B12. They noted she has not been on lorazepam since January, and also recommended weaning down off gabapentin if possible. On 7/4 patient had an unwitnessed fall while trying to reach for her bag on the window sill. No head strike, but landed on her L hip. GCS 15, Neuro exam was baseline. CTA ordered to look for active bleeding, which showed decreased size of hematoma with no active extravasation. Hgb did drop after her transfusion, but seems to have stablized today at 8.5.  That being said, her HR was elevated today, and it appears a troponin has been ordered by the trauma team as well as a CXR. CXR showed hazy RLL opacity, likely 2/2 atelectasis, and trops trended down. Metoprolol was increased with improvement. She was admitted to the [facility] on 7/7. This week we will continue to monitor labs and vital signs. WE will educate pt/family about repositioning to prevent skin breakdown. We will assist w repositioning and perform routine skin checks. We will monitor for adequate pain control. We will encourage independence with ADLs. We will increase safety awareness and keep pt free from falls. We will monitor I/O to maintain adequate fluid balance. Case Management:     Discharge Planning  Living Arrangements: Other (Comment) (Lives w/ son)  Support Systems: Self, Son, Daughter  Assistance Needed: unknown  Type of Current Residence: Other (Comment) (two story home)  Current Home Care Services: No  Pt admitted s/p fall, resides with family and is expected to return home. Awaiting call back from pts daughter to confirm level of support and prior services pt has access to. Following to assist w/dc planning needs. Is the patient actively participating in therapies? yes  List any modifications to the treatment plan:     Barriers Interventions   Sundowning, mild cog,  15 min checks, verbal cues, signs in her room for reminder   uti Medication initiated   Left hernandez from previous cva, effecting adls Occupational therapy   Left side inattention Cueing to attend to the left, visual perception training   Fall risk, one lashawn Therapy stair training     Is the patient making expected progress toward goals?  yes  List any update or changes to goals:     Medical Goals: Patient will be medically stable for discharge to [facility] upon completion of rehab program and Patient will be able to manage medical conditions and comorbid conditions with medications and follow up upon completion of rehab program    Weekly Team Goals:   Rehab Team Goals  ADL Team Goal: Patient will require supervision with ADLs with least restrictive device upon completion of rehab program  Transfer Team Goal: Patient will require supervision with transfers with least restrictive device upon completion of rehab program  Locomotion Team Goal: Patient will require supervision with locomotion with least restrictive device upon completion of rehab program  Cognitive Team Goal: Patient will return to premorbid level of cognitive activity upon completion of rehab program    Discussion: pt presents with the above barriers and in addition baseline dysphagia on a modified diet. Pt is functioning at min a bed mobility, and transfers and ambulation. Pt is min to mod a with adls. Daughter assists with bathing pta and the current recommendations are that pt will need daily care. Goals are supervision to contact guard with estimated los of two weeks. Anticipated Discharge Date:  7/21/23  [facility] Team Members Present: The following team members are supervising care for this patient and were present during this Weekly Team Conference.     Physician: Dr. Mary Hummel MD  : Saskia Rudd MSW  Registered Nurse: Yusuf Banegas RN  Physical Therapist: Chaitanya Pedroza DPT  Occupational Therapist: Patrick Shay MS, OTR/L  Speech Therapist: Laura Rosales, [id number] [address]

## 2023-07-12 NOTE — PROGRESS NOTES
07/12/23 0830   Pain Assessment   Pain Assessment Tool 0-10   Pain Score No Pain   Restrictions/Precautions   Precautions Aspiration;Bed/chair alarms;Cognitive; Fall Risk;Impulsive;Pain;Supervision on toilet/commode   Weight Bearing Restrictions No   ROM Restrictions No   Eating   Type of Assistance Needed Set-up / clean-up;Supervision;Verbal cues   Physical Assistance Level No physical assistance   Eating CARE Score 4   Swallow Assessment   Swallow Treatment Assessment Daily Dysphagia Tx Note      Patient Name: Marine Bryan     RCVBB'W Date: 7/12/2023        Current Risks for Dysphagia & Aspiration: Weak voicing; weak cough; dysarthria; general debilitation; new neuro event; brain injury; cognitive deficit; hx neurologic dx; positioning issues     Current Symptoms/Concerns: difficulty chewing; decreased oral intake; hx of dysphagia/aspiration; throat clearing; cough with intake     Current diet:puree/level 1 diet and nectar thick liquids      Premorbid diet::puree/level 1 diet and nectar thick liquids      Positioning: upright in wheelchair     Items administered:Consistencies Administered: thin liquids, puree and mechanical soft solids  Materials administered included thin coffee by cup, puree scrambled eggs and puree Czech toast, diced canned peaches and NTL by cup     Total amount of meal consumed:   75%  120cc by cup sips        Oral stage:moderate  Lip closure: completed  Anterior spillage: minimal amounts with liquids  Mastication: mild-moderately prolonged with soft solids, mild with puree  Bolus formation: decreased   Bolus control: suspect reduced, especially with liquids  Transfer: slow and delayed with foods, more timely with liquids  Oral residue: none  Pocketing: none        Pharyngeal stage:moderate  Swallow promptness: suspected delayed  Hyolaryngeal elevation: present but reduced  Wet voice: none  Throat clear: present with thin liquids x1  Cough: immediate cough with thins x2, with peaches x1 as well as hawking with peaches x1  Secondary swallows: piecemeal transfers completed with soft solids and liquids  Audible swallows: present with liquids and purees        Esophageal stage:  No s/s this session HOWEVER pt reports continued GI concerns with reflux which occurred prior to admission as well. Pt follows GI through [facility] and upon discussion, pt deferring work up with our physicians at this time but is encouraged to follow up regarding deficits present. Pt asking SLP about esophageal stretching and hiatal hernia surgery to which SLP was not able to comment and encouraged pt to speak with doctor. Summary:     Pt presenting with moderate oral and pharyngeal dysphagia this session. Symptoms or concerns included trace anterior loss of thins, slow manipulation of puree, prolonged mastication of level 2 trials, reduced bolus formation, suspected reduced bolus control, slow and at times labored transfers, suspected pharyngeal swallow delay, suspected reduced hyolaryngeal elevation, secondary swallows, audible swallows and presence of frequent throat clearing and coughing across meal.      Pt set up with meal tray and able to self feed independently. Pt assessed with trials included diced peaches as well as baseline puree textures. Pt with good retrieval and control, open mouth posture with chewing, prolonged and anterior chewing patterns, decreased bolus breakdown, attempted transfer with partial chewed peaches requiring hawking x1 to redirect and chew more. Pt also with cough x1 during peaches from suspected mixed consistency of texture and juice pooling/control. With thin liquids by cup, more timely transfers and swallows however still with immediate cough x2. No s/s noted with NTL- pt conts with audible swallows with ALL liquids and puree textures. Puree textures more timely with bolus formations, transfers and swallows. Pt also commenting "those do go down smoother".  No retention/pocketing observed at end of meal.       Recommendations:  Diet: puree/level 1 diet and nectar thick liquids  Meds: whole or cut with puree  Strategies: upright posture, only feed when fully alert, slow rate of feeding, small bites/sips, effortful swallow, quiet environment (tv off, limit talking, door closed, etc.) and alternating bites and sips     FULL supervision w/ meals. Results reviewed with: patient, Team  Aspiration precautions posted. F/u ST tx: Pt is currently recommended for further skilled SLP services targeting dysphagia therapy in order to maximize oral and pharyngeal swallow skills, while safely supporting PO intake, as well as to improve independent carryover of safe swallow strategies. Plan: Cont to monitor pt on current diet              Trial upgraded textures of dysphagia level 2 and thin liquids as appropriate              May benefit from f/u with GI team which pt follows with [facility] given esophageal concerns. Cont education on puree diet and NTL- providing handouts on types of thickener products (pt reported powder thickener gave her diarrhea) as well as recipes for puree foods. Swallow Assessment Prognosis   Prognosis Fair   Prognosis Considerations Age; Co-morbidities; Medical diagnosis; Medical prognosis;Previous level of function;Severity of impairments;New learning ability;Ability to carry over; Cooperation   SLP Therapy Minutes   SLP Time In 0830   SLP Time Out 0930   SLP Total Time (minutes) 60   SLP Mode of treatment - Individual (minutes) 60   SLP Mode of treatment - Concurrent (minutes) 0   SLP Mode of treatment - Group (minutes) 0   SLP Mode of treatment - Co-treat (minutes) 0   SLP Mode of Treatment - Total time(minutes) 60 minutes   SLP Cumulative Minutes 210   Therapy Time missed   Time missed?  No

## 2023-07-12 NOTE — PLAN OF CARE
Problem: PAIN - ADULT  Goal: Verbalizes/displays adequate comfort level or baseline comfort level  Description: Interventions:  - Encourage patient to monitor pain and request assistance  - Assess pain using appropriate pain scale  - Administer analgesics based on type and severity of pain and evaluate response  - Implement non-pharmacological measures as appropriate and evaluate response  - Consider cultural and social influences on pain and pain management  - Notify physician/advanced practitioner if interventions unsuccessful or patient reports new pain  Outcome: Progressing     Problem: INFECTION - ADULT  Goal: Absence or prevention of progression during hospitalization  Description: INTERVENTIONS:  - Assess and monitor for signs and symptoms of infection  - Monitor lab/diagnostic results  - Monitor all insertion sites, i.e. indwelling lines, tubes, and drains  - Monitor endotracheal if appropriate and nasal secretions for changes in amount and color  - [place] appropriate cooling/warming therapies per order  - Administer medications as ordered  - Instruct and encourage patient and family to use good hand hygiene technique  - Identify and instruct in appropriate isolation precautions for identified infection/condition  Outcome: Progressing  Goal: Absence of fever/infection during neutropenic period  Description: INTERVENTIONS:  - Monitor WBC    Outcome: Progressing     Problem: SAFETY ADULT  Goal: Patient will remain free of falls  Description: INTERVENTIONS:  - Educate patient/family on patient safety including physical limitations  - Instruct patient to call for assistance with activity   - Consult OT/PT to assist with strengthening/mobility   - Keep Call bell within reach  - Keep bed low and locked with side rails adjusted as appropriate  - Keep care items and personal belongings within reach  - Initiate and maintain comfort rounds  - Make Fall Risk Sign visible to staff  - Offer Toileting every 2 Hours, in advance of need  - Initiate/Maintain bed/chair alarm  - Obtain necessary fall risk management equipment: nonskid socks  - Apply yellow socks and bracelet for high fall risk patients  - Consider moving patient to room near nurses station  Outcome: Progressing  Goal: Maintain or return to baseline ADL function  Description: INTERVENTIONS:  -  Assess patient's ability to carry out ADLs; assess patient's baseline for ADL function and identify physical deficits which impact ability to perform ADLs (bathing, care of mouth/teeth, toileting, grooming, dressing, etc.)  - Assess/evaluate cause of self-care deficits   - Assess range of motion  - Assess patient's mobility; develop plan if impaired  - Assess patient's need for assistive devices and provide as appropriate  - Encourage maximum independence but intervene and supervise when necessary  - Involve family in performance of ADLs  - Assess for home care needs following discharge   - Consider OT consult to assist with ADL evaluation and planning for discharge  - Provide patient education as appropriate  Outcome: Progressing  Goal: Maintains/Returns to pre admission functional level  Description: INTERVENTIONS:  - Perform BMAT or MOVE assessment daily.   - Set and communicate daily mobility goal to care team and patient/family/caregiver. - Collaborate with rehabilitation services on mobility goals if consulted  - Perform Range of Motion 3 times a day. - Reposition patient every 2 hours.   - Dangle patient 3 times a day  - Stand patient 3 times a day  - Ambulate patient 3 times a day  - Out of bed to chair 3 times a day   - Out of bed for meals 3 times a day  - Out of bed for toileting  - Record patient progress and toleration of activity level   Outcome: Progressing     Problem: DISCHARGE PLANNING  Goal: Discharge to home or other facility with appropriate resources  Description: INTERVENTIONS:  - Identify barriers to discharge w/patient and caregiver  - Arrange for needed discharge resources and transportation as appropriate  - Identify discharge learning needs (meds, wound care, etc.)  - Arrange for interpretive services to assist at discharge as needed  - Refer to Case Management Department for coordinating discharge planning if the patient needs post-hospital services based on physician/advanced practitioner order or complex needs related to functional status, cognitive ability, or social support system  Outcome: Progressing     Problem: Prexisting or High Potential for Compromised Skin Integrity  Goal: Skin integrity is maintained or improved  Description: INTERVENTIONS:  - Identify patients at risk for skin breakdown  - Assess and monitor skin integrity  - Assess and monitor nutrition and hydration status  - Monitor labs   - Assess for incontinence   - Turn and reposition patient  - Assist with mobility/ambulation  - Relieve pressure over bony prominences  - Avoid friction and shearing  - Provide appropriate hygiene as needed including keeping skin clean and dry  - Evaluate need for skin moisturizer/barrier cream  - Collaborate with interdisciplinary team   - Patient/family teaching  - Consider wound care consult   Outcome: Progressing     Problem: Nutrition/Hydration-ADULT  Goal: Nutrient/Hydration intake appropriate for improving, restoring or maintaining nutritional needs  Description: Monitor and assess patient's nutrition/hydration status for malnutrition. Collaborate with interdisciplinary team and initiate plan and interventions as ordered. Monitor patient's weight and dietary intake as ordered or per policy. Utilize nutrition screening tool and intervene as necessary. Determine patient's food preferences and provide high-protein, high-caloric foods as appropriate.      INTERVENTIONS:  - Monitor oral intake, urinary output, labs, and treatment plans  - Assess nutrition and hydration status and recommend course of action  - Evaluate amount of meals eaten  - Assist patient with eating if necessary   - Allow adequate time for meals  - Recommend/ encourage appropriate diets, oral nutritional supplements, and vitamin/mineral supplements  - Order, calculate, and assess calorie counts as needed  - Recommend, monitor, and adjust tube feedings and TPN/PPN based on assessed needs  - Assess need for intravenous fluids  - Provide specific nutrition/hydration education as appropriate  - Include patient/family/caregiver in decisions related to nutrition  Outcome: Progressing

## 2023-07-12 NOTE — PROGRESS NOTES
Internal Medicine Progress Note  Patient: Ainsley Lopez  Age/sex: 70 y.o. female  Medical Record #: [id number]      ASSESSMENT/PLAN: (Interval History)  Ainsley Lopez is seen and examined and management for following issues:    Left lateral thigh hematoma   • Stable  • Will watch hemoglobin     ABLA  • S/p 1 unit PRBCs 7/2/23     Thrush  • Continue Nystatin     Chronic combined systolic/diastolic heart failure   • Originally diagnosed 2004 when LVEF then was 10-15% and felt to be NICM  • Her LVEF did recover into 50's then fell when she had her CVA in 2/2023 to 30%  • Also has moderate AI/gr 2 DD  • Sees Dr Caprice Alvarenga a [facility] in 2/2023 when LVEF was found to be 30% which is most current  • Had been on Coreg in past but HF team switched her to Toprol when she had her stroke 2/2023  • Her soft BPs have limited starting her on ACEI/ARB as OP  • Home:  Toprol XL 25mg BID/Aldactone 25mg qd  • Here:  Toprol XL 25 mg BID/Aldactone 25mg qd  • Minipress was stopped on 7/8 since hadn't been getting 2/2 low BPs  • Toprol/Aldactone given last 3 days     Hypercalcemia  • Continue Cinacalcet here as was taking at home     HS hypoxia   • using 4L NC HS at home  • Is using O2 at night but amt not documented. 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  • Has residual left sided weakness  • Had recrudescence of CVA 4/2023 with worsening left facial droop  • Continue Eliquis     Dysphagia  • Had admission for [facility] tx'd with Levaquin and Flagyl  • Continue pureed diet with NTL as at home  • Continue Ensure HP pudding TID  • ST following     Bipolar depression  • Home:  Wellbutrin 100mg BID/Cymbalta 60mg BID/Lamictal 150mg qhs  • Here:  Wellbutrin 100mg BID/Cymbalta 60mg BID/Lamictal 150mg qhs     Rheumatoid arthritis  • Continue Plaquenil 200mg BID as at home     Chronic pain/chronic opioid use  • 2/2 RA and lumbar spondylosis  • Pain management per PMR     Hx incidental right superior hypophyseal artery aneurysm  • Follows with NS Dr Lee Purdy     [facility] urine 3/2023  • Maintain contact isolation     Dye allergy   • Was dye prepped on 7/4/23 with Benadryl 50mg IV x 1 w/o issues     Incidental findings  • Pancolitis with increasing inflammatory free fluid in pelvis  • Calcified granuloma RLL     Hypokalemia  • Got Kdur 40 meq x 2 on 7/8/23  • K+ was 4.1 on 7/10/23  • BMP 7/13/23     Enterococcus UTI  • on Macrobid per Dr Maria R Snyder = sensitive        Discharge date:  Team       The above assessment and plan was reviewed and updated as determined by my evaluation of the patient on 7/12/2023.     Labs:   Results from last 7 days   Lab Units 07/10/23  0501 07/08/23  0537   WBC Thousand/uL 7.64 6.56   HEMOGLOBIN g/dL 9.3* 9.2*   HEMATOCRIT % 29.7* 29.2*   PLATELETS Thousands/uL 330 279     Results from last 7 days   Lab Units 07/10/23  0501 07/08/23  0537   SODIUM mmol/L 143 144   POTASSIUM mmol/L 4.1 3.2*   CHLORIDE mmol/L 112* 114*   CO2 mmol/L 26 25   BUN mg/dL 18 15   CREATININE mg/dL 0.91 0.88   CALCIUM mg/dL 8.1* 8.3                   Review of Scheduled Meds:  Current Facility-Administered Medications   Medication Dose Route Frequency Provider Last Rate   • acetaminophen  650 mg Oral Q6H [address] Thao Coles MD     • albuterol  2 puff Inhalation Q6H PRN Thao Coles MD     • apixaban  5 mg Oral BID Thao Coles MD     • buPROPion  100 mg Oral BID Thao Coles MD     • cinacalcet  30 mg Oral Daily With Breakfast Thao Coles MD     • Diclofenac Sodium  2 g Topical 4x Daily PRN Thao Coles MD     • DULoxetine  60 mg Oral BID Thao Coles MD     • gabapentin  400 mg Oral BID Thao Coles MD     • hydroxychloroquine  200 mg Oral BID With Meals Thao Coles MD     • lamoTRIgine  150 mg Oral HS Lore Boyce Mary Hummel MD     • lidocaine  1 patch Topical Daily LAURA Rutledge     • magnesium Oxide  400 mg Oral BID Neeraj Pandey MD     • melatonin  3 mg Oral HS Neeraj Pandey MD     • metoprolol succinate  25 mg Oral BID LAURA Che     • nitrofurantoin  100 mg Oral BID With Meals Neeraj Pandey MD     • nystatin  500,000 Units Swish & Spit 4x Daily Neeraj Pandey MD     • ondansetron  4 mg Oral Q6H PRN Neeraj Pandey MD     • oxyCODONE  5 mg Oral Q4H PRN Neeraj Pandey MD     • oxyCODONE  2.5 mg Oral Q4H PRN Neeraj Pandey MD     • pantoprazole  40 mg Oral Early Morning Neeraj Pandey MD     • simethicone  80 mg Oral 4x Daily (with meals and at bedtime) LAURA Che     • spironolactone  25 mg Oral Daily Neeraj Pandey MD         Subjective/ HPI: Patient seen and examined. Patients overnight issues or events were reviewed with nursing or staff during rounds or morning huddle session. New or overnight issues include the following:     No new or overnight issues. Offers no complaints    ROS:   A 10 point ROS was performed; negative except as noted above.        Imaging:     No orders to display       *Labs /Radiology studies reviewed  *Medications reviewed and reconciled as needed  *Please refer to order section for additional ordered labs studies  *Case discussed with primary attending during morning huddle case rounds    Physical Examination:  Vitals:   Vitals:    07/11/23 2035 07/11/23 2210 07/12/23 0533 07/12/23 0659   BP:  100/60  95/72   BP Location:    Left arm   Pulse: 83 86  84   Resp: 20   19   Temp: 98 °F (36.7 °C)   97.8 °F (36.6 °C)   TempSrc: Axillary   Oral   SpO2: 98%   98%   Weight:   54 kg (119 lb 0.8 oz)    Height:           General Appearance: no distress, conversive  HEENT: PERRLA, conjuctiva normal; oropharynx clear; mucous membranes moist   Neck:  Supple, normal ROM  Lungs: CTA, normal respiratory effort, no retractions, expiratory effort normal  CV: regular rate and rhythm; no rubs/murmurs/gallops, PMI normal   ABD: soft; ND/NT; +BS  EXT: no edema  Skin: normal turgor, normal texture, no rashes  Psych: affect normal, mood normal  Neuro: AAO     The above physical exam was reviewed and updated as determined by my evaluation of the patient on 7/12/2023. Invasive Devices     Peripheral Intravenous Line  Duration           Long-Dwell Peripheral IV (Midline) 94/85/02 Right Basilic 8 days                   VTE Pharmacologic Prophylaxis: Eliquis  Code Status: Level 1 - Full Code  Current Length of Stay: 5 day(s)      Total time spent:  30 minutes with more than 50% spent counseling/coordinating care. Counseling includes discussion with patient re: progress  and discussion with patient of his/her current medical state/information. Coordination of patient's care was performed in conjunction with primary service. Time invested included review of patient's labs, vitals, and management of their comorbidities with continued monitoring. In addition, this patient was discussed with medical team including physician and advanced extenders. The care of the patient was extensively discussed and appropriate treatment plan was formulated unique for this patient. Medical decision making for the day was made by supervising physician unless otherwise noted in their attestation statement. ** Please Note:  voice to text software may have been used in the creation of this document.  Although proof errors in transcription or interpretation are a potential of such software**

## 2023-07-12 NOTE — PCC SPEECH THERAPY
Pt completed beside swallow assessment on initial eval. Pt known to this unit from previous admission. Pt with baseline dysphagia- diet at home puree and NTL. Currently, pt presenting with  moderate oral and pharyngeal dysphagia. Symptoms or concerns included trace anterior loss of thins, slow manipulation of puree, prolonged mastication of level 2 trials, reduced bolus formation, suspected reduced bolus control, slow and at times labored transfers, suspected pharyngeal swallow delay, suspected reduced hyolaryngeal elevation, secondary swallows, audible swallows and presence of frequent throat clearing and coughing across meal. Recommendations are for cont'd puree and NTL at this time- frequent oral care given mild thrush which is improving. Plan to cont to assess at bedside and possibly complete repeat VFSS as last one (to this department knowledge) was completed on [facility] in March 2023. In regards to cognition, pt with baseline mild cognitive deficits with sundowning behaviors. Pt will cont to benefit from skilled SLP services targeting swallow function for increased independence and decreased burden of care at this time. Update for week of 7/18/2023: Pt conts to be followed for skilled SLP services targeting swallow. Pt currently functioning at mild to moderate oral and pharyngeal dysphagia. Symptoms or concerns included slow manipulation of puree, reduced bolus formation, suspected reduced bolus control, slow and at times labored transfers, suspected pharyngeal swallow delay, suspected reduced hyolaryngeal elevation, secondary swallows, audible swallows as well as GI s/s present. Discussed held with pt in regards to GI hx- pt recommended follow up with her outpt specialist through [facility], pt does not want to pursue work up during this hospital stay with [facility]. Education started in regards to swallow function and diet recommendations.  Folder started with educational handouts, diet education, where to purchase different thickeners as well as recipes for high protein puree options. Plan to cont to trial some softer solid options to allow some options of level 2 items while at home under home SLP supervision/recommendations. Plan is for discharge Friday with home therapy services- pt will benefit from cont'd dysphagia tx to maximize swallow function and tolerate LRD with decreased s/s aspiration at this time. Update week of 7/25/2023: Pt continues to be followed for skilled SLP services focusing on dysphagia therapy. Pt is presenting with mild-moderate oral and moderate pharyngeal dysphagia characterized by decreased bolus propulsion, decreased mastication, decreased bolus formation, delayed oral intiation and suspected decreased control of NTL suspected pharyngeal swallow delay, suspected decreased hyolaryngeal elevation upon palpation, suspected pharyngeal residue, multiple swallows, effortful swallow, audible swallows and weaker throat clearing. Pt remains on a puree diet with NTL, as well as recommendations for full supervision for meals in order to ensure carryover of safe swallow strategies. Limited trials of level 2 items have been completed, however, pt demonstrates notable difficulty with these trials at this time. Family training has been completed with both of pt's children regarding current diet recommendations, positioning and safe swallow strategies. Pt is currently recommended for further skilled SLP services targeting dysphagia therapy in order to maximize oral and pharyngeal swallow skills, while safely supporting PO intake, as well as to improve independent carryover of safe swallow strategies.

## 2023-07-12 NOTE — PROGRESS NOTES
Physical Medicine and Rehabilitation Progress Note  Gregorio Domingo 70 y.o. female MRN: [id number]  Unit/Bed#: -01 Encounter: [id number]    To Review: Gregorio Domingo is a 70 y.o. female with medical history of HTN, CVA in 2/2023 on eliquis (chronic dysphagia, L sided weakness, cognitive impairment), hypercalcemia,  CHF, GERD, SHAR, gout, bipolar disorder, RA on plaquenil, chronic opioid dependence on morphine for chronic back/joint pain, who presented to the [address] [place] [address] [address] on 6/30 after a fall, unable to get up and disoriented. She thinks she tripped and fell down her porch steps, landing on he L thigh. Found to have a L lateral thigh hematoma with evidence of extravasation on imaging. Binder placed over hips, and eliquis was held but not reversed given her recent stroke. Speech consulted and noted oropharyngeal swallow likely at baseline and recommended Level 1/NTL. Recommended for VBS. Course c/b ABLA on 7/2 Hgb dropped to 6.8 and she was given 1 unit pRBC. Geriatrics was consulted, noted at baseline she is oriented but forgetful, with worsening issues since her stroke. Mini-Cog was 9/15 in 2021. They recommended checking B12. They noted she has not been on lorazepam since January, and also recommended weaning down off gabapentin if possible. On 7/4 patient had an unwitnessed fall while trying to reach for her bag on the window sill. No head strike, but landed on her L hip. GCS 15, Neuro exam was baseline. CTA ordered to look for active bleeding, which showed decreased size of hematoma with no active extravasation. Hgb did drop after her transfusion, but seems to have stablized today at 8.5. That being said, her HR was elevated today, and it appears a troponin has been ordered by the trauma team as well as a CXR. CXR showed hazy RLL opacity, likely 2/2 atelectasis, and trops trended down. Metoprolol was increased with improvement. She was admitted to the [address] [place] [address] on 7/7. Chief Complaint: No new issues. Posterior neck pain. Interval History/Subjective:  No acute events overnight. Last BM 7/11. No new CP, SOB, fevers, chills, N/V, abdominal pain. Bladder continence has been better. Had some bowel incontinence yesterday. Sleep has been fine, but last night did wake up with an episode of confusion. Alarm went off, and she was easily able to be redirected. She asked the team if they could make her a sign to hang up on the wall to prompt her to use the call bell. Noted to have stable L hemiparesis and L inattention    ROS:  A 10 point review of systems was negative except for what is noted in the HPI. Today's Changes:  1. Continue Macrobid through 7/14. 2. Continue with Q15hr checks overnight, with sleep log. Maintain bed alarms. 3. Participated in team conference today, see separate notes. 4. Switch to home morphine from oxy. She has been using Oxy 5mg, so will do Morphine 7.5mg   5. Per SLP, bigger issues with esophagus no need for VBS at this time, f/u with GI. Total time spent:  50 minutes, with more than 50% spent counseling/coordinating care. Counseling includes discussion with patient re: progress in therapies, functional issues observed by therapy staff, and discussion with patient his/her current medical state/wellbeing. Coordination of patient's care was performed in conjunction with Internal Medicine service to monitor patient's labs, vitals, and management of their comorbidities. In addition, this patient was discussed by the interdisciplinary team in weekly case conference today. The care of the patient was extensively discussed with all care providers and an appropriate rehabilitation plan was formulated unique for this patient. Barriers were identified preventing progression of therapy and appropriate interventions were discussed with each discipline.  Please see the team note for input from all disciplines regarding barriers, intervention, and discharge planning. Assessment/Plan:    * Leg hematoma, left, initial encounter  Assessment & Plan  2/2 fall    6/30 CT CAP:  Large hematoma in the lateral left thigh measuring up to 9.6 cm with focus of active contrast extravasation. No acute intrathoracic or intra-abdominal injury. 7/4 CTA Pelvis:  1. Decreasing size of left lateral thigh hematoma without evidence for active extravasation. 2.  No CT findings in the visualized pelvis and thighs to explain falling hematocrit. 3.  Pancolitis with increasing inflammatory free fluid in the pelvis. No free air, pneumatosis, or encapsulated collections demonstrated. Non-operatively managed. Did require transfusion x1  Pain management as below  Monitor Hgb. PT/OT 3-5 hours/day, 5-7 days/week  Outpatient f/u with General Surgery      Cervical dystonia  Assessment & Plan  Recommend outpatient f/u with Dr. Tiago Saunders to evaluate for botulinum toxin injections  Appears to have a retrocollis. Sundowning  Assessment & Plan  Does wake up confused at night, but is easily redirectable  - More confusion, than agitation  - Wandered all the way down to the cafeteria on the 1st floor on 7/9  - No injuries  - Brought back to her room  - UCx with 100k enterococcus. - treat UTI  - Low bed, Q15hr checks overnight, Sleep logs  - Floor mats. - Delirium precautions    Night terrors  Assessment & Plan  Home: Prazosin  Here: Prazosin  Continue melatonin  Outpatient f/u with PCP    Thrush  Assessment & Plan  Noted on admission  7/7 started on nystatin swish/spit  - 7/11 improving  - Continue for at least 7 days with 2-3 days after her antibiotics stop    Hypomagnesemia  Assessment & Plan  Home: Mag oxide 800mg BID  Here: Mag oxide 400mg BID  7/9 Mag 1.9   Goals > 2. Sinus tachycardia  Assessment & Plan  Home: Toprol XL 25mg BID  Here: Same  On 7/6 with HR in 115-120. EKG showed sinus tach. Trops trended and negative.    - Associated with transient/mild SOB   - She was only being given Toprol daily in the hospital   - Increased to BID with improvement  Lytes with K > 4, Mag > 2  Monitor and adjust regimen as appropriate  IM consulted to assist with management  Outpatient f/u with Dr. Jose J Mendoza    Dysphagia  Assessment & Plan  2/2 previous CVA earlier this year. Noted to have impaired oropharyngeal swallow  Was on free water protocol at home - holding for now due to thrush  On Level 1/NTL  - Inpatient SLP recommended VBS  - Consulting SLP here  - Aspiration precautions  - Strict oral hygiene prior to trials    Urinary tract infection without hematuria  Assessment & Plan  Present on admission  With incontinence and confusion  Grew 100k CFU Enterococcus    - Started on Macrobid based on antibiogram on 7/9   - sensitivity confirms susceptibility    History of stroke  Assessment & Plan  MRI brain 2/22 - 1.  New small acute /more recent interval lacunar infarction in the right cerebral peduncle (series 4, image 14). 2.  Extensive multifocal large, previously present right middle cerebral artery infarction which is evolving with superimposed areas of evolving hemorrhage likely related to hemorrhagic transformation of subacute right MCA infarction.  No significant   mass effect. 3.  Mild, chronic microangiopathy. S/P thrombectomy 2/12 by Dr Mac Perez   Secondary stroke prophylaxis at this time with Eliquis 5 mg twice daily   PT, OT, SLP. Review modifiable risk factors and provide stroke education  Monitor for poststroke pain  Eliquis for secondary stroke prophylaxis    Outpatient f/u with Neurology    Obstructive sleep apnea  Assessment & Plan  Previously non-compliant with hospital or home CPAP  Outpatient f/u with PCP. Hypercalcemia  Assessment & Plan  Suspect 2/2 primary hyperparathyroidism  7/8 iCal 1.12, Phos 3.3  Home: Sensipar 30mg daily  Here: Same  Recommend outpatient f/u with Endocrinology      HTN (hypertension)  Assessment & Plan  Tends to run on the low/normal side.   Home: Toprol XL 25mg BID, Spironolactone 25mg daily  Here: Same  Has not been able to get meds at times due to not meeting parameters. Monitor and adjust as appropriate  IM consulted to assist with management    Bipolar depression [facility])  Assessment & Plan  Home: Bupropion 100mg BID, Cymbalta 60mg BID, Ativan 0.25mg BID PRN (however this has not been filled since January 2023 as per PDMP)  Here: Bupropion 100mg BID, Cymbalta 60mg BID  Consulting rehab psychology for support  Adjust as appropriate  Outpatient f/u with PCP    Severe protein-calorie malnutrition ([address])  Assessment & Plan  Malnutrition Findings:                                 BMI Findings: Body mass index is 20.89 kg/m². Consulted nutrition    Osteoporosis  Assessment & Plan  Hx L1 compression fx s/p kyphoplasty  Was previously on Prolia - last given in 2020   - Held due to dental issues  Recommend outpatient f/u with Endocrinology  2/23 Vitamin D level is 41.7    Anemia  Assessment & Plan  2/2 L leg hematoma (most recent imaging negative for extravasation)  At home on eliquis  No reversal inpatient due to recent CVA  Prior to admission Hgb 10-12. Ravi 7/2 6.8 - given 1 unit pRBC  7/8 Hgb 8.5 > 9.2 > 9.3  Monitor, transfuse as appropriate. IM consulted to assist with management    CHF (congestive heart failure) ([address])  Assessment & Plan  Wt Readings from Last 3 Encounters:   07/12/23 54 kg (119 lb 0.8 oz)   06/30/23 55.3 kg (122 lb)   06/06/23 47.9 kg (105 lb 9.6 oz)     2/2023 Echo 30% with G2DD, moderate AI  Home: Toprol XL 25mg BID, Aldactone 25mg daily   - Limited by BP - no ACE/ARB   - no SGLT2i due to recurrent UTI  Here: Toprol XL 25mg BID, spironolactone 25mg daily   Unclear etiology of cardiomyopathy  Possible consideration for ischemic work-up outpatient, poor candidate for aggressive procedures   Monitor I/O/daily weights, volume status, lytes.   IM consulted to assist with management  Outpatient f/u with Dr. Wilian Henriquez      Mixed stress and urge urinary incontinence  Assessment & Plan  Frankly incontinent here  Initiate timed voids and scans  Has history of recurrent UTIs - will check UA on admission here given extent of incontinence   - Had confusion on 7/8-7/9 overnight. - See UTI for details. Close continence care      Gastroesophageal reflux disease without esophagitis  Assessment & Plan  Home: PPI daily  Here: PPI daily    Seronegative arthropathy of multiple sites [facility])  Assessment & Plan  Follows with Dr. Tammy Nieves with Rheum and is on Hydroxychloroquine 200mg BID. Continue home regimen and follow-up with rheumatology on discharge    Mild intermittent asthma without complication  Assessment & Plan  No acute exacerbation  Home: Albuterol PRN  Here: Same    Chronic pain syndrome  Assessment & Plan  Joint and back pain  Follows with Dr. Jared Mcclellan with [facility]  Reviewed PDMP - at home on MSIR 7.5mg BID PRN. Here: MSIR 7.5mg BID PRN  - Outpatient f/u with Dr. Jared Mcclellan. Health Maintenance  #Delirium/Sleep: At high risk. See cognitive impairment above. #Pain: Tylenol scheduled 650mg Q6hr, Gabapentin 400mg BID (will try to wean down here), Cymbalta 60mg BID, Oxycodone 2.5-5mg PRN  #Bowel: Last BM 7/11. PRN bisacodyl suppository/miralax  #Bladder: See incontinence above  #Skin/Pressure Injury Prevention: Turn Q2hr in bed, with weight shifts [id number] in wheelchair. Float heels in bed. #DVT Prophylaxis: Fully anticoagulated on eliquis 5mg BID, SCDs. #GI Prophylaxis: Protonix already for chronic issues. #Code Status: Full Code  #FEN: Level 1/NTL, Ensure HP Pudding- Lavone Repress at Breakfast/Lunch/Dinner  #Dispo: Team 7/12:  ADD 7/21 with goals to discharge home at [facility] ADLs, Sup-mod Ind mobility. Home PT/OT/SLP (Swallow). Objective:    Functional Update:   PT: Marika bed mobility, transfers, ambulation  OT: min-modA ADLs  SLP: mild-mod oropharyngeal dysphagia. Recommended for Pureed/NTL. Recommended for outpatient f/u with GI.      Allergies per [facility]    Physical Exam:  Temp:  [97.8 °F (36.6 °C)-98.7 °F (37.1 °C)] 97.8 °F (36.6 °C)  HR:  [64-86] 84  Resp:  [18-20] 19  BP: ()/(58-72) 95/72  Oxygen Therapy  SpO2: 98 %  O2 Flow Rate (L/min): 2 L/min    Gen: No acute distress, frail  HEENT: Moist mucus membranes, Normocephalic/Atraumatic  Cardiovascular: Regular rate, rhythm, S1/S2. Distal pulses palpable  Heme/Extr: No edema  Pulmonary: Non-labored breathing. Lungs CTAB  : No lopes  GI: Soft, non-tender, non-distended. BS+  MSK: Appears to have a retrocollis  Integumentary: Skin is warm, dry. Groin - MASD  Neuro: AAOx3, Speech is intact. Appropriate to questioning. Tone is normal.   Psych: Normal mood and affect.        Diagnostic Studies: Reviewed, no new imaging    Laboratory:  Reviewed  Results from last 7 days   Lab Units 07/10/23  0501 07/08/23  0537 07/06/23  0534   HEMOGLOBIN g/dL 9.3* 9.2* 8.5*   HEMATOCRIT % 29.7* 29.2* 26.4*   WBC Thousand/uL 7.64 6.56 5.31     Results from last 7 days   Lab Units 07/10/23  0501 07/08/23  0537   BUN mg/dL 18 15   POTASSIUM mmol/L 4.1 3.2*   CHLORIDE mmol/L 112* 114*   CREATININE mg/dL 0.91 0.88   AST U/L  --  15   ALT U/L  --  21            Patient Active Problem List   Diagnosis   • Chronic pain syndrome   • Mild intermittent asthma without complication   • Ambulatory dysfunction   • Seronegative arthropathy of multiple sites (HCC)   • Gastroesophageal reflux disease without esophagitis   • Mixed stress and urge urinary incontinence   • CHF (congestive heart failure) ([facility])   • Anemia   • Undifferentiated connective tissue disease ([address])   • Primary generalized (osteo)arthritis   • Peripheral neuropathy   • Osteoporosis   • Hx of compression fracture of spine   • Lumbar spondylosis   • Cervical spondylosis   • Closed fracture of multiple ribs of right side   • Traumatic pneumothorax   • Closed fracture of transverse process of lumbar vertebra [facility])   • Liver contusion   • Fall   • Acute pain due to trauma   • Right shoulder pain   • Severe protein-calorie malnutrition (HCC)   • Bipolar depression (HCC)   • Rheumatoid arthritis of multiple sites with negative rheumatoid factor (HCC)   • 'light-for-dates' infant with signs of fetal malnutrition   • LAILA (acute kidney injury) ([address])   • Polypharmacy   • Cardiomyopathy ([address])   • Vitamin D insufficiency   • Stroke ([address])   • HTN (hypertension)   • HFrEF (heart failure with reduced ejection fraction) ([facility])   • Hypercalcemia   • Discoloration of skin of foot   • Abdominal pain   • Acute CVA (cerebrovascular accident) ([address])   • History of bacteremia   • Obstructive sleep apnea   • History of stroke   • Urinary tract infection without hematuria   • AMS (altered mental status)   • Gout of foot   • Dysphagia   • Wound of left ankle   • Hypokalemia   • Sinus tachycardia   • Hypomagnesemia   • Insomnia   • Nocturnal hypoxia   • Stroke-like symptoms   • Cerebral aneurysm, nonruptured   • Leg hematoma, left, initial encounter   • Thrush   • Night terrors   • Sundowning   • Cervical dystonia         Medications  Current Facility-Administered Medications   Medication Dose Route Frequency Provider Last Rate   • acetaminophen  650 mg Oral Q6H Marciano Schmitt MD     • albuterol  2 puff Inhalation Q6H PRN Winnie Jalloh MD     • apixaban  5 mg Oral BID Winnie Jalloh MD     • buPROPion  100 mg Oral BID Winnie Jalloh MD     • cinacalcet  30 mg Oral Daily With Breakfast Winnie Jalloh MD     • Diclofenac Sodium  2 g Topical 4x Daily PRN Winnie Jalloh MD     • DULoxetine  60 mg Oral BID Winnie Jalloh MD     • gabapentin  400 mg Oral BID Winnie Jalloh MD     • hydroxychloroquine  200 mg Oral BID With Meals Winnie Jalloh MD     • lamoTRIgine  150 mg Oral HS Winnie Jalloh MD     • lidocaine  1 patch Topical Daily LAURA Rutledge     • magnesium Oxide  400 mg Oral BID Winnie Jalloh MD     • melatonin  3 mg Oral HS Winnie Jalloh MD     • metoprolol succinate  25 mg Oral BID LAURA Cartwright     • nitrofurantoin  100 mg Oral BID With Meals Berny Juarez MD     • nystatin  500,000 Units Swish & Spit 4x Daily Berny Juarez MD     • ondansetron  4 mg Oral Q6H PRN Berny Juarez MD     • oxyCODONE  5 mg Oral Q4H PRN Berny Juarez MD     • oxyCODONE  2.5 mg Oral Q4H PRN Berny Juarez MD     • pantoprazole  40 mg Oral Early Morning Berny Juarez MD     • simethicone  80 mg Oral 4x Daily (with meals and at bedtime) LAURA Cartwright     • spironolactone  25 mg Oral Daily Berny Juarez MD            ** Please Note: Fluency Direct voice to text software may have been used in the creation of this document.  **

## 2023-07-12 NOTE — PROGRESS NOTES
07/12/23 1030   Pain Assessment   Pain Assessment Tool 0-10   Pain Score 6   Pain Location/Orientation Orientation: Lower; Location: Back   Pain Onset/Description Onset: Ongoing;Frequency: Intermittent; Descriptor: Aching;Descriptor: Sore   Patient's Stated Pain Goal No pain   Hospital Pain Intervention(s) Rest;Emotional support   Restrictions/Precautions   Precautions Aphasia;Bed/chair alarms;Cognitive; Fall Risk;Impulsive;Pain;Supervision on toilet/commode   Weight Bearing Restrictions No   ROM Restrictions No   Cognition   Overall Cognitive Status Impaired   Arousal/Participation Alert;Arousable   Attention Attends with cues to redirect   Orientation Level Oriented X4   Memory Decreased short term memory;Decreased recall of recent events;Decreased recall of precautions   Following Commands Follows one step commands with increased time or repetition   Sit to Stand   Type of Assistance Needed Incidental touching; Adaptive equipment   Comment CGA with RW from [facility]   Sit to Stand CARE Score 4   Bed-Chair Transfer   Type of Assistance Needed Incidental touching; Adaptive equipment   Comment CGA with RW   Chair/Bed-to-Chair Transfer CARE Score 4   Transfer Bed/Chair/Wheelchair   Adaptive Equipment Roller Walker   Walk 10 Feet   Type of Assistance Needed Verbal cues; Adaptive equipment;Supervision; Incidental touching   Comment CS/CGA with RW, VC's to remain within RW. Walk 10 Feet CARE Score 4   Walk 50 Feet with Two Turns   Type of Assistance Needed Incidental touching; Adaptive equipment;Verbal cues   Comment CGA with RW, VC's to remain within RW. Walk 50 Feet with Two Turns CARE Score 4   Walk 150 Feet   Type of Assistance Needed Verbal cues; Incidental touching; Adaptive equipment   Comment CGA with RW, VC's to remain within RW.    Walk 150 Feet CARE Score 4   Ambulation   Primary Mode of Locomotion Prior to Admission Walk   Distance Walked (feet) 150 ft  (140' x2)   Assist Device [employer]   Gait Pattern Inconsistant Amy;Decreased foot clearance; Forward Flexion;Narrow AYLA;Shuffle;Step to;Scissoring; Improper weight shift   Limitations Noted In Coordination;Balance; Endurance; Heel Strike;Posture;Speed;Strength;Swing   Provided Assistance with: Direction   Walk Assist Level Contact Guard;Close Supervision   Does the patient walk? 2. Yes   Curb or Single Stair   Style negotiated Curb   Type of Assistance Needed Incidental touching; Adaptive equipment;Verbal cues   Comment CGA over 6" curb with RW, VC's for sequencing and to slow down. Pt mimicing home set up and stepping down bwds while holding on to frame of car door as she has a grab bar at home. 1 Step (Curb) CARE Score 4   12 Steps   Reason if not Attempted Safety concerns   12 Steps CARE Score 88   Stairs   Type Curb   # of Steps 1   Weight Bearing Precautions Fall Risk   Assist Devices Roller Walker   Findings Pt asked to provide pictures of bathroom set up. Per PT Luis Ibanez she can also reach out to son Meghan Samayoa. Therapeutic Interventions   Strengthening seated LAQ, hip flex, glute sets and ankle DF/PF 3 x10 reps BLE with 1.5#.   Assessment   Treatment Assessment Pt participated in skilled PT session with increased focus on curb step management, gait, LE strengthening and discussion of bathroom set up at home. Pt practiced set up of bathroom as she states a step down is needed to get into bathroom then she performs a SPT with no AD but grab bar. Some difficulty getting an accurate set up as pt is a poor historian. Discussed getting pictures from son top have better idea of set up. Will have to follow up with son or daughter. Pt will cont to benefit from increased I with functional transfers, increased gait, increased endurance, increased safety awareness and insight into current deficits to decrease burden of car at discharge. Problem List Decreased strength;Decreased endurance; Impaired balance;Decreased mobility;Pain;Decreased skin integrity   Barriers to Discharge Inaccessible home environment;Decreased caregiver support   PT Barriers   Functional Limitation Transfers;Stair negotiation; Walking   Plan   Treatment/Interventions Functional transfer training;LE strengthening/ROM; Therapeutic exercise; Endurance training;Cognitive reorientation;Patient/family training;Gait training   Progress Progressing toward goals   Recommendation   PT Discharge Recommendation Home with home health rehabilitation   Equipment Recommended Walker   PT Therapy Minutes   PT Time In 1030   PT Time Out 1130   PT Total Time (minutes) 60   PT Mode of treatment - Individual (minutes) 60   PT Mode of treatment - Concurrent (minutes) 0   PT Mode of treatment - Group (minutes) 0   PT Mode of treatment - Co-treat (minutes) 0   PT Mode of Treatment - Total time(minutes) 60 minutes   PT Cumulative Minutes 250   Therapy Time missed   Time missed?  No

## 2023-07-12 NOTE — PROGRESS NOTES
OT Daily Treatment Note       07/12/23 0700   Pain Assessment   Pain Assessment Tool 0-10   Pain Score 7   Pain Location/Orientation Orientation: Bilateral;Location: Leg   Hospital Pain Intervention(s) Shower/Bath   Restrictions/Precautions   Precautions Aspiration;Bed/chair alarms;Cognitive; Fall Risk;Impulsive;Pain;Supervision on toilet/commode;Visual deficit   Lifestyle   Autonomy "I dont know why I'm automatically using my hand that doesnt work"   Oral Hygiene   Reason if not Attempted Refused to perform  (requesting to complete after breakfast)   Oral Hygiene CARE Score 7   Grooming   Findings completed brushing of hair and applying facial cream seated in WC at sink req setup   Shower/Bathe Self   Type of Assistance Needed Physical assistance   Physical Assistance Level 25% or less   Comment full shower completed seated on TTB. Min A required to stabilize in stance during posterior bathing. pt also required vc as she was observed to perseverate on washing hair mutliple times over without moving on to body part. When OT questioned, she stated "it just feels good" but after completing the same action repeatedly for >5 mins, OT cued to move on to bathing other body parts. Shower/Bathe Self CARE Score 3   Bathing   Assessed Bath Style Shower   Anticipated D/C Bath Style Sponge Bath; Shower   Positioning Seated;Standing   Tub/Shower Transfer   Findings Min A lateral step in to walk-in shower   Upper Body Dressing   Type of Assistance Needed Physical assistance   Physical Assistance Level 51%-75%   Comment mod A to thread over LUE and down back as pt was observed trying to thread LUE but was missing completely. with cues to attend to what her LUE was doing, pt was then able to grab shirt to make attempt to don but cont to demo difficulty with task and reqested for assistance from OT. same for overhead bra.    Upper Body Dressing CARE Score 2   Lower Body Dressing   Type of Assistance Needed Physical assistance Physical Assistance Level 26%-50%   Comment seated to thread BLE and CG/min A in stance with cues  to attend to LUE as visualize as she attempts to don over her L hip as pt was grabbing at nothing and was not succesfully donning pants. with cues, pt able to complete overall task with min A.    Lower Body Dressing CARE Score 3   Putting On/Taking Off Footwear   Type of Assistance Needed Physical assistance   Physical Assistance Level 51%-75%   Comment pt able to doff BL socks but req TA to don   Putting On/Taking Off Footwear CARE Score 2   Lying to Sitting on Side of Bed   Type of Assistance Needed Incidental touching   Physical Assistance Level No physical assistance   Comment CG with HOB slightly elevated   Lying to Sitting on Side of Bed CARE Score 4   Sit to Stand   Type of Assistance Needed Physical assistance   Physical Assistance Level 25% or less   Comment Fei/CG with RW   Sit to Stand CARE Score 3   Bed-Chair Transfer   Type of Assistance Needed Physical assistance   Physical Assistance Level 25% or less   Comment min A/CG with RW   Chair/Bed-to-Chair Transfer CARE Score 3   Cognition   Overall Cognitive Status Impaired   Arousal/Participation Alert;Arousable   Attention Attends with cues to redirect   Orientation Level Oriented X4   Memory Decreased short term memory;Decreased recall of recent events;Decreased recall of precautions   Following Commands Follows one step commands with increased time or repetition   Additional Activities   Additional Activities Other (Comment)   Additional Activities Comments as per pt request, OT left large, colored sign in pts room parallel to her bed stating "use your call bell" as pt feels that would be a helpful reminder overnight when she needs to toilet and is having mild confusion   Activity Tolerance   Activity Tolerance Patient tolerated treatment well   Assessment   Treatment Assessment Pt participated in skilled OT session with focus on ADL retraining, functional transfer training, compensatory technique education and continued education. See flowsheet for details of session and current functional status. Pt is limited by weakness, decreased ROM, impaired balance, decreased endurance, increased fall risk, decreased ADLS, decreased IADLS, pain, decreased activity tolerance, decreased safety awareness, impaired judgement, decreased cognition, decreased strength and visual deficits and requires skilled OT services to increase independence and safety with ADL completion in prep for DC home. Plan to continue ADL retraining, functional transfer training, UE strengthening/ROM, endurance training, cognitive reorientation, Pt/caregiver education, neuro re-ed, compensatory technique education, continued education, energy conservation and activity engagement  to address barriers mentioned above. Prognosis Fair   Problem List Decreased strength;Decreased endurance; Impaired balance;Decreased mobility;Pain;Decreased skin integrity   Barriers to Discharge Inaccessible home environment;Decreased caregiver support   Plan   Treatment/Interventions ADL retraining;Functional transfer training; Therapeutic exercise; Endurance training;Cognitive reorientation;Patient/family training; Compensatory technique education   Progress Progressing toward goals   OT Therapy Minutes   OT Time In 0700   OT Time Out 0830   OT Total Time (minutes) 90   OT Mode of treatment - Individual (minutes) 90   OT Mode of treatment - Concurrent (minutes) 0   OT Mode of treatment - Group (minutes) 0   OT Mode of treatment - Co-treat (minutes) 0   OT Mode of Treatment - Total time(minutes) 90 minutes   OT Cumulative Minutes 398   Therapy Time missed   Time missed?  No

## 2023-07-13 LAB
ANION GAP SERPL CALCULATED.3IONS-SCNC: 2 MMOL/L
BASOPHILS # BLD AUTO: 0.03 THOUSANDS/ÂΜL (ref 0–0.1)
BASOPHILS NFR BLD AUTO: 1 % (ref 0–1)
BUN SERPL-MCNC: 17 MG/DL (ref 5–25)
CALCIUM SERPL-MCNC: 8.4 MG/DL (ref 8.3–10.1)
CHLORIDE SERPL-SCNC: 112 MMOL/L (ref 96–108)
CO2 SERPL-SCNC: 26 MMOL/L (ref 21–32)
CREAT SERPL-MCNC: 0.83 MG/DL (ref 0.6–1.3)
EOSINOPHIL # BLD AUTO: 0 THOUSAND/ÂΜL (ref 0–0.61)
EOSINOPHIL NFR BLD AUTO: 0 % (ref 0–6)
ERYTHROCYTE [DISTWIDTH] IN BLOOD BY AUTOMATED COUNT: 19.9 % (ref 11.6–15.1)
GFR SERPL CREATININE-BSD FRML MDRD: 72 ML/MIN/1.73SQ M
GLUCOSE P FAST SERPL-MCNC: 93 MG/DL (ref 65–99)
GLUCOSE SERPL-MCNC: 93 MG/DL (ref 65–140)
HCT VFR BLD AUTO: 32.3 % (ref 34.8–46.1)
HGB BLD-MCNC: 10.1 G/DL (ref 11.5–15.4)
IMM GRANULOCYTES # BLD AUTO: 0.03 THOUSAND/UL (ref 0–0.2)
IMM GRANULOCYTES NFR BLD AUTO: 1 % (ref 0–2)
LYMPHOCYTES # BLD AUTO: 1.37 THOUSANDS/ÂΜL (ref 0.6–4.47)
LYMPHOCYTES NFR BLD AUTO: 23 % (ref 14–44)
MCH RBC QN AUTO: 31.3 PG (ref 26.8–34.3)
MCHC RBC AUTO-ENTMCNC: 31.3 G/DL (ref 31.4–37.4)
MCV RBC AUTO: 100 FL (ref 82–98)
MONOCYTES # BLD AUTO: 0.75 THOUSAND/ÂΜL (ref 0.17–1.22)
MONOCYTES NFR BLD AUTO: 13 % (ref 4–12)
NEUTROPHILS # BLD AUTO: 3.77 THOUSANDS/ÂΜL (ref 1.85–7.62)
NEUTS SEG NFR BLD AUTO: 62 % (ref 43–75)
NRBC BLD AUTO-RTO: 0 /100 WBCS
PLATELET # BLD AUTO: 295 THOUSANDS/UL (ref 149–390)
PMV BLD AUTO: 10.6 FL (ref 8.9–12.7)
POTASSIUM SERPL-SCNC: 4.1 MMOL/L (ref 3.5–5.3)
RBC # BLD AUTO: 3.23 MILLION/UL (ref 3.81–5.12)
SODIUM SERPL-SCNC: 140 MMOL/L (ref 135–147)
WBC # BLD AUTO: 5.95 THOUSAND/UL (ref 4.31–10.16)

## 2023-07-13 PROCEDURE — 97110 THERAPEUTIC EXERCISES: CPT

## 2023-07-13 PROCEDURE — 99232 SBSQ HOSP IP/OBS MODERATE 35: CPT

## 2023-07-13 PROCEDURE — 85025 COMPLETE CBC W/AUTO DIFF WBC: CPT | Performed by: NURSE PRACTITIONER

## 2023-07-13 PROCEDURE — 80048 BASIC METABOLIC PNL TOTAL CA: CPT | Performed by: NURSE PRACTITIONER

## 2023-07-13 PROCEDURE — 99232 SBSQ HOSP IP/OBS MODERATE 35: CPT | Performed by: INTERNAL MEDICINE

## 2023-07-13 PROCEDURE — 97530 THERAPEUTIC ACTIVITIES: CPT

## 2023-07-13 RX ADMIN — METOPROLOL SUCCINATE 25 MG: 25 TABLET, FILM COATED, EXTENDED RELEASE ORAL at 08:13

## 2023-07-13 RX ADMIN — GABAPENTIN 400 MG: 400 CAPSULE ORAL at 08:14

## 2023-07-13 RX ADMIN — NITROFURANTOIN (MONOHYDRATE/MACROCRYSTALS) 100 MG: 75; 25 CAPSULE ORAL at 08:15

## 2023-07-13 RX ADMIN — NYSTATIN 500000 UNITS: 100000 SUSPENSION ORAL at 17:35

## 2023-07-13 RX ADMIN — MELATONIN TAB 3 MG 3 MG: 3 TAB at 22:29

## 2023-07-13 RX ADMIN — DULOXETINE HYDROCHLORIDE 60 MG: 60 CAPSULE, DELAYED RELEASE ORAL at 17:35

## 2023-07-13 RX ADMIN — ACETAMINOPHEN 650 MG: 325 TABLET, FILM COATED ORAL at 05:53

## 2023-07-13 RX ADMIN — OXYCODONE HYDROCHLORIDE 5 MG: 5 TABLET ORAL at 22:29

## 2023-07-13 RX ADMIN — BUPROPION HYDROCHLORIDE 100 MG: 100 TABLET, FILM COATED ORAL at 17:37

## 2023-07-13 RX ADMIN — SIMETHICONE 80 MG: 80 TABLET, CHEWABLE ORAL at 12:09

## 2023-07-13 RX ADMIN — MAGNESIUM OXIDE TAB 400 MG (241.3 MG ELEMENTAL MG) 400 MG: 400 (241.3 MG) TAB at 17:35

## 2023-07-13 RX ADMIN — NYSTATIN 500000 UNITS: 100000 SUSPENSION ORAL at 08:14

## 2023-07-13 RX ADMIN — APIXABAN 5 MG: 5 TABLET, FILM COATED ORAL at 08:14

## 2023-07-13 RX ADMIN — ACETAMINOPHEN 650 MG: 325 TABLET, FILM COATED ORAL at 01:01

## 2023-07-13 RX ADMIN — MAGNESIUM OXIDE TAB 400 MG (241.3 MG ELEMENTAL MG) 400 MG: 400 (241.3 MG) TAB at 08:14

## 2023-07-13 RX ADMIN — HYDROXYCHLOROQUINE SULFATE 200 MG: 200 TABLET ORAL at 08:15

## 2023-07-13 RX ADMIN — HYDROXYCHLOROQUINE SULFATE 200 MG: 200 TABLET ORAL at 17:37

## 2023-07-13 RX ADMIN — SIMETHICONE 80 MG: 80 TABLET, CHEWABLE ORAL at 22:28

## 2023-07-13 RX ADMIN — ACETAMINOPHEN 650 MG: 325 TABLET, FILM COATED ORAL at 17:34

## 2023-07-13 RX ADMIN — NITROFURANTOIN (MONOHYDRATE/MACROCRYSTALS) 100 MG: 75; 25 CAPSULE ORAL at 17:37

## 2023-07-13 RX ADMIN — DULOXETINE HYDROCHLORIDE 60 MG: 60 CAPSULE, DELAYED RELEASE ORAL at 08:13

## 2023-07-13 RX ADMIN — LAMOTRIGINE 150 MG: 100 TABLET ORAL at 22:29

## 2023-07-13 RX ADMIN — CINACALCET 30 MG: 30 TABLET, FILM COATED ORAL at 08:15

## 2023-07-13 RX ADMIN — APIXABAN 5 MG: 5 TABLET, FILM COATED ORAL at 17:35

## 2023-07-13 RX ADMIN — BUPROPION HYDROCHLORIDE 100 MG: 100 TABLET, FILM COATED ORAL at 08:15

## 2023-07-13 RX ADMIN — PANTOPRAZOLE SODIUM 40 MG: 40 TABLET, DELAYED RELEASE ORAL at 05:53

## 2023-07-13 RX ADMIN — SPIRONOLACTONE 25 MG: 25 TABLET ORAL at 08:15

## 2023-07-13 RX ADMIN — SIMETHICONE 80 MG: 80 TABLET, CHEWABLE ORAL at 08:13

## 2023-07-13 RX ADMIN — ACETAMINOPHEN 650 MG: 325 TABLET, FILM COATED ORAL at 12:09

## 2023-07-13 RX ADMIN — GABAPENTIN 400 MG: 400 CAPSULE ORAL at 17:35

## 2023-07-13 RX ADMIN — LIDOCAINE 5% 1 PATCH: 700 PATCH TOPICAL at 08:14

## 2023-07-13 RX ADMIN — SIMETHICONE 80 MG: 80 TABLET, CHEWABLE ORAL at 17:34

## 2023-07-13 RX ADMIN — NYSTATIN 500000 UNITS: 100000 SUSPENSION ORAL at 12:12

## 2023-07-13 RX ADMIN — NYSTATIN 500000 UNITS: 100000 SUSPENSION ORAL at 22:28

## 2023-07-13 NOTE — PROGRESS NOTES
PM&R PROGRESS NOTE:  Reema Daley 70 y.o. female MRN: [id number]  Unit/Bed#: -01 Encounter: [id number]        Rehabilitation Diagnosis: Impairment of mobility, safety, Activities of Daily Living (ADLs), and cognitive/communication skills due to Other Disabling Impairments:  13  Other Disabling Impairments    HPI: Reema Daley is a 70 y.o. female with medical history of HTN, CVA in 2/2023 on eliquis (chronic dysphagia, L sided weakness, cognitive impairment), hypercalcemia,  CHF, GERD, SHAR, gout, bipolar disorder, RA on plaquenil, chronic opioid dependence on morphine for chronic back/joint pain, who presented to the [address] [place] [address] [address] on 6/30 after a fall, unable to get up and disoriented. She thinks she tripped and fell down her porch steps, landing on he L thigh. Found to have a L lateral thigh hematoma with evidence of extravasation on imaging. Binder placed over hips, and eliquis was held but not reversed given her recent stroke. Speech consulted and noted oropharyngeal swallow likely at baseline and recommended Level 1/NTL. Recommended for VBS. Course c/b ABLA on 7/2 Hgb dropped to 6.8 and she was given 1 unit pRBC. Geriatrics was consulted, noted at baseline she is oriented but forgetful, with worsening issues since her stroke. Mini-Cog was 9/15 in 2021. They recommended checking B12. They noted she has not been on lorazepam since January, and also recommended weaning down off gabapentin if possible. On 7/4 patient had an unwitnessed fall while trying to reach for her bag on the window sill. No head strike, but landed on her L hip. GCS 15, Neuro exam was baseline. CTA ordered to look for active bleeding, which showed decreased size of hematoma with no active extravasation. Hgb did drop after her transfusion, but seems to have stablized today at 8.5.  That being said, her HR was elevated today, and it appears a troponin has been ordered by the trauma team as well as a CXR. CXR showed hazy RLL opacity, likely 2/2 atelectasis, and trops trended down. Metoprolol was increased with improvement. She was admitted to the [facility] on 7/7. SUBJECTIVE: Patient seen face to face. No acute issues overnight. No vivid dream, feels slept well without interruption. Appetite at baseline, voiding, LBM 7/11. Denies chest pain, shortness of breath, fever, chills, N/V, abdominal pain. ASSESSMENT: Stable, progressing    PLAN:  - anemia: Hbg 10.1 steadily improving  - UTI: Macrobid thru 7/14  - hypokalemia: K+ 4.1, stable, continue to follow with routine labs  - pain management: well managed with current regimen  - thrush- Nystatin through 7/17  - sundown: continue with preventative measures: floor mats, frequent rounding, bed/chair alarms audible on loudest setting  - b/l lower extremity edema- elevate lower extremities when in bed/chair, on Aldactone 25 mg daily, monitor    Rehabilitation  • Functional deficits:  cognitive impairment, self-care, impaired mobility  • Continue current rehabilitation plan of care to maximize function. • Functional update:   o PT: mobility- incidental touching, transfers- incidental touching, ambulation- incidental touching '  o OT: ADL: bathing- min A, dressing- UB mod-max A LB min-mod A, footwear- mod-max A, toileting- min A  o SLP: dysphagia Level 1/NTL  • Estimated Discharge: anticipated 7/21 with home PT/OT    Pain  • Tylenol 975 mg every 8 hours  • Lidocaine patch  • Gabapentin 400 mg BID  • Diclofenac Sodium topical gel QID prn  • Oxycodone 2.5-5 mg every 4 hours prn    DVT prophylaxis  • Eliquis    Bladder plan  • Continent    Bowel plan  • Continent      * Leg hematoma, left, initial encounter  Assessment & Plan  2/2 fall    6/30 CT CAP:  Large hematoma in the lateral left thigh measuring up to 9.6 cm with focus of active contrast extravasation. No acute intrathoracic or intra-abdominal injury. 7/4 CTA Pelvis:  1.   Decreasing size of left lateral thigh hematoma without evidence for active extravasation. 2.  No CT findings in the visualized pelvis and thighs to explain falling hematocrit. 3.  Pancolitis with increasing inflammatory free fluid in the pelvis. No free air, pneumatosis, or encapsulated collections demonstrated. Non-operatively managed. Did require transfusion x1  Pain management as below  Monitor Hgb. PT/OT 3-5 hours/day, 5-7 days/week  Outpatient f/u with General Surgery      History of stroke  Assessment & Plan  MRI brain 2/22 - 1.  New small acute /more recent interval lacunar infarction in the right cerebral peduncle (series 4, image 14). 2.  Extensive multifocal large, previously present right middle cerebral artery infarction which is evolving with superimposed areas of evolving hemorrhage likely related to hemorrhagic transformation of subacute right MCA infarction.  No significant   mass effect. 3.  Mild, chronic microangiopathy. S/P thrombectomy 2/12 by Dr Quiana Alvarez   Secondary stroke prophylaxis at this time with Eliquis 5 mg twice daily   PT, OT, SLP. Review modifiable risk factors and provide stroke education  Monitor for poststroke pain  Eliquis for secondary stroke prophylaxis    Outpatient f/u with Neurology    CHF (congestive heart failure) ([address])  Assessment & Plan  Wt Readings from Last 3 Encounters:   07/12/23 54 kg (119 lb 0.8 oz)   06/30/23 55.3 kg (122 lb)   06/06/23 47.9 kg (105 lb 9.6 oz)     2/2023 Echo 30% with G2DD, moderate AI  Home: Toprol XL 25mg BID, Aldactone 25mg daily   - Limited by BP - no ACE/ARB   - no SGLT2i due to recurrent UTI  Here: Toprol XL 25mg BID, spironolactone 25mg daily   Unclear etiology of cardiomyopathy  Possible consideration for ischemic work-up outpatient, poor candidate for aggressive procedures   Monitor I/O/daily weights, volume status, lytes.   IM consulted to assist with management  Outpatient f/u with Dr. Jorge A Whalen      Dysphagia  Assessment & Plan  2/2 previous CVA earlier this year. Noted to have impaired oropharyngeal swallow  Was on free water protocol at home - holding for now due to thrush  On Level 1/NTL  - Inpatient SLP recommended VBS  - Consulting SLP here  - Aspiration precautions  - Strict oral hygiene prior to trials    Urinary tract infection without hematuria  Assessment & Plan  Present on admission  With incontinence and confusion  Grew 100k CFU Enterococcus    - Started on Macrobid based on antibiogram on 7/9   - sensitivity confirms susceptibility    Mixed stress and urge urinary incontinence  Assessment & Plan  Frankly incontinent here  Initiate timed voids and scans  Has history of recurrent UTIs - will check UA on admission here given extent of incontinence   - Had confusion on 7/8-7/9 overnight. - See UTI for details. Close continence care      Anemia  Assessment & Plan  2/2 L leg hematoma (most recent imaging negative for extravasation)  At home on eliquis  No reversal inpatient due to recent CVA  Prior to admission Hgb 10-12. Ravi 7/2 6.8 - given 1 unit pRBC  7/8 Hgb 8.5 > 9.2 > 9.3  Monitor, transfuse as appropriate. IM consulted to assist with management    Cervical dystonia  Assessment & Plan  Recommend outpatient f/u with Dr. Sameer Barajas to evaluate for botulinum toxin injections  Appears to have a retrocollis. Sundowning  Assessment & Plan  Does wake up confused at night, but is easily redirectable  - More confusion, than agitation  - Wandered all the way down to the cafeteria on the 1st floor on 7/9  - No injuries  - Brought back to her room  - UCx with 100k enterococcus. - treat UTI  - Low bed, Q15hr checks overnight, Sleep logs  - Floor mats.    - Delirium precautions    Night terrors  Assessment & Plan  Home: Prazosin  Here: Prazosin  Continue melatonin  Outpatient f/u with PCP    Thrush  Assessment & Plan  Noted on admission  7/7 started on nystatin swish/spit  - 7/11 improving  - Continue for at least 7 days with 2-3 days after her antibiotics stop    Hypomagnesemia  Assessment & Plan  Home: Mag oxide 800mg BID  Here: Mag oxide 400mg BID  7/9 Mag 1.9   Goals > 2. Sinus tachycardia  Assessment & Plan  Home: Toprol XL 25mg BID  Here: Same  On 7/6 with HR in 115-120. EKG showed sinus tach. Trops trended and negative. - Associated with transient/mild SOB   - She was only being given Toprol daily in the hospital   - Increased to BID with improvement  Lytes with K > 4, Mag > 2  Monitor and adjust regimen as appropriate  IM consulted to assist with management  Outpatient f/u with Dr. Martina Chanel    Obstructive sleep apnea  Assessment & Plan  Previously non-compliant with hospital or home CPAP  Outpatient f/u with PCP. Hypercalcemia  Assessment & Plan  Suspect 2/2 primary hyperparathyroidism  7/8 iCal 1.12, Phos 3.3  Home: Sensipar 30mg daily  Here: Same  Recommend outpatient f/u with Endocrinology      HTN (hypertension)  Assessment & Plan  Tends to run on the low/normal side. Home: Toprol XL 25mg BID, Spironolactone 25mg daily  Here: Same  Has not been able to get meds at times due to not meeting parameters. Monitor and adjust as appropriate  IM consulted to assist with management    Bipolar depression [facility])  Assessment & Plan  Home: Bupropion 100mg BID, Cymbalta 60mg BID, Ativan 0.25mg BID PRN (however this has not been filled since January 2023 as per PDMP)  Here: Bupropion 100mg BID, Cymbalta 60mg BID  Consulting rehab psychology for support  Adjust as appropriate  Outpatient f/u with PCP    Severe protein-calorie malnutrition ([facility])  Assessment & Plan  Malnutrition Findings:                                 BMI Findings: Body mass index is 20.89 kg/m².      Consulted nutrition    Osteoporosis  Assessment & Plan  Hx L1 compression fx s/p kyphoplasty  Was previously on Prolia - last given in 2020   - Held due to dental issues  Recommend outpatient f/u with Endocrinology  2/23 Vitamin D level is 41.7    Gastroesophageal reflux disease without esophagitis  Assessment & Plan  Home: PPI daily  Here: PPI daily    Seronegative arthropathy of multiple sites [facility])  Assessment & Plan  Follows with Dr. Robb Red with Rheum and is on Hydroxychloroquine 200mg BID. Continue home regimen and follow-up with rheumatology on discharge    Mild intermittent asthma without complication  Assessment & Plan  No acute exacerbation  Home: Albuterol PRN  Here: Same    Chronic pain syndrome  Assessment & Plan  Joint and back pain  Follows with Dr. Isidro Iglesias with [facility]  Reviewed PDMP - at home on MSIR 7.5mg BID PRN. Here: MSIR 7.5mg BID PRN  - Outpatient f/u with Dr. Isidro Iglesias. Appreciate IM consultants medical co-management. Labs, medications, and imaging personally reviewed. ROS:  Review of Systems   A 10 point review of systems was negative except for what is noted in the HPI. OBJECTIVE:   /68 (BP Location: Right arm)   Pulse 74   Temp 97.5 °F (36.4 °C) (Oral)   Resp 16   Ht 5' 3" (1.6 m)   Wt 55.6 kg (122 lb 9.2 oz)   SpO2 96%   BMI 21.71 kg/m²     Physical Exam  Constitutional:       Appearance: Normal appearance. HENT:      Head: Normocephalic and atraumatic. Nose: Nose normal.      Mouth/Throat:      Mouth: Mucous membranes are moist.   Eyes:      Extraocular Movements: Extraocular movements intact. Pupils: Pupils are equal, round, and reactive to light. Cardiovascular:      Rate and Rhythm: Normal rate and regular rhythm. Pulses: Normal pulses. Heart sounds: Normal heart sounds. Pulmonary:      [place]: Pulmonary effort is normal.      Breath sounds: Normal breath sounds. Abdominal:      General: Bowel sounds are normal.      Palpations: Abdomen is soft. Musculoskeletal:         General: Normal range of motion. Cervical back: Normal range of motion. Right lower leg: Edema present. Left lower leg: Edema present. Skin:     General: Skin is warm and dry.       Capillary Refill: Capillary refill takes less than 2 seconds. Findings: Bruising present. Neurological:      Mental Status: She is alert and oriented to person, place, and time. Motor: Weakness present.    Psychiatric:         Mood and Affect: Mood normal.         Judgment: Judgment normal.        Lab Results   Component Value Date    WBC 5.95 07/13/2023    HGB 10.1 (L) 07/13/2023    HCT 32.3 (L) 07/13/2023     (H) 07/13/2023     07/13/2023     Lab Results   Component Value Date    SODIUM 140 07/13/2023    K 4.1 07/13/2023     (H) 07/13/2023    CO2 26 07/13/2023    BUN 17 07/13/2023    CREATININE 0.83 07/13/2023    GLUC 93 07/13/2023    CALCIUM 8.4 07/13/2023     Lab Results   Component Value Date    INR 1.33 (H) 07/01/2023    INR 1.65 (H) 06/30/2023    INR 1.66 (H) 04/29/2023    PROTIME 16.7 (H) 07/01/2023    PROTIME 19.5 (H) 06/30/2023    PROTIME 19.9 (H) 04/29/2023           Current Facility-Administered Medications:   •  acetaminophen (TYLENOL) tablet 650 mg, 650 mg, Oral, Q6H [facility], Eve Lanier MD, 650 mg at 07/13/23 0553  •  albuterol (PROVENTIL HFA,VENTOLIN HFA) inhaler 2 puff, 2 puff, Inhalation, Q6H PRN, Eve Lanier MD  •  apixaban (ELIQUIS) tablet 5 mg, 5 mg, Oral, BID, Eve Lanier MD, 5 mg at 07/13/23 0648  •  buPROPion [facility]) tablet 100 mg, 100 mg, Oral, BID, Eve Lanier MD, 100 mg at 07/13/23 0815  •  cinacalcet (SENSIPAR) tablet 30 mg, 30 mg, Oral, Daily With Breakfast, Eve Lanier MD, 30 mg at 07/13/23 0815  •  Diclofenac Sodium (VOLTAREN) 1 % topical gel 2 g, 2 g, Topical, 4x Daily PRN, Eve Lanier MD  •  DULoxetine (CYMBALTA) delayed release capsule 60 mg, 60 mg, Oral, BID, Eve Lanier MD, 60 mg at 07/13/23 0813  •  gabapentin (NEURONTIN) capsule 400 mg, 400 mg, Oral, BID, Eve Lanier MD, 400 mg at 07/13/23 2661  •  hydroxychloroquine (PLAQUENIL) tablet 200 mg, 200 mg, Oral, BID With Meals, Eve Lanier MD, 200 mg at 07/13/23 0815  •  lamoTRIgine (LaMICtal) tablet 150 mg, 150 mg, Oral, HS, Ronal Kinney MD, 150 mg at 07/12/23 2130  •  lidocaine (LIDODERM) 5 % patch 1 patch, 1 patch, Topical, Daily, LAURA Rutledge, 1 patch at 07/13/23 9072  •  magnesium Oxide (MAG-OX) tablet 400 mg, 400 mg, Oral, BID, Ronal Kinney MD, 400 mg at 07/13/23 2992  •  melatonin tablet 3 mg, 3 mg, Oral, HS, Ronal Kinney MD, 3 mg at 07/12/23 2140  •  metoprolol succinate (TOPROL-XL) 24 hr tablet 25 mg, 25 mg, Oral, BID, LAURA Casillas, 25 mg at 07/13/23 0385  •  nitrofurantoin (MACROBID) extended-release capsule 100 mg, 100 mg, Oral, BID With Meals, Ronal Kinney MD, 100 mg at 07/13/23 0815  •  nystatin (MYCOSTATIN) oral suspension 500,000 Units, 500,000 Units, Swish & Spit, 4x Daily, Ronal Kinney MD, 500,000 Units at 07/13/23 6063  •  ondansetron (ZOFRAN-ODT) dispersible tablet 4 mg, 4 mg, Oral, Q6H PRN, Ronal Kinney MD, 4 mg at 07/09/23 1431  •  oxyCODONE (ROXICODONE) IR tablet 5 mg, 5 mg, Oral, Q4H PRN, Ronal Kinney MD, 5 mg at 07/12/23 2048  •  oxyCODONE (ROXICODONE) split tablet 2.5 mg, 2.5 mg, Oral, Q4H PRN, Ronal Kinney MD, 2.5 mg at 07/10/23 1438  •  pantoprazole (PROTONIX) EC tablet 40 mg, 40 mg, Oral, Early Morning, Ronal Kinney MD, 40 mg at 07/13/23 0553  •  simethicone (MYLICON) chewable tablet 80 mg, 80 mg, Oral, 4x Daily (with meals and at bedtime), LAURA Bueno, 80 mg at 07/13/23 0813  •  spironolactone (ALDACTONE) tablet 25 mg, 25 mg, Oral, Daily, Ronal Kinney MD, 25 mg at 07/13/23 0815    Past Medical History:   Diagnosis Date   • Anxiety    • Asthma    • Bipolar depression ([address])    • Chronic narcotic dependence (HCC)    • Chronic pain    • Depression    • Enterovirus heart infection    • SHAR (obstructive sleep apnea)    • Osteoarthritis    • Peripheral neuropathy    • Plantar fasciitis of right foot    • Senile osteoporosis    • Seronegative arthropathy of multiple sites [facility])    • Undifferentiated connective tissue disease [facility])        Patient Active Problem List    Diagnosis Date Noted   • Leg hematoma, left, initial encounter 07/01/2023   • History of stroke 02/28/2023   • CHF (congestive heart failure) ([address])    • Dysphagia 03/09/2023   • Urinary tract infection without hematuria 03/06/2023   • Mixed stress and urge urinary incontinence    • Anemia 06/26/2021   • Cervical dystonia 07/11/2023   • Sundowning 07/09/2023   • Thrush 07/07/2023   • Night terrors 07/07/2023   • Cerebral aneurysm, nonruptured 05/15/2023   • Nocturnal hypoxia 05/01/2023   • Stroke-like symptoms 05/01/2023   • Insomnia 04/03/2023   • Hypomagnesemia 03/30/2023   • Sinus tachycardia 03/29/2023   • Hypokalemia 03/10/2023   • AMS (altered mental status) 03/09/2023   • Gout of foot 03/09/2023   • Wound of left ankle 03/09/2023   • Obstructive sleep apnea 02/28/2023   • History of bacteremia 02/24/2023   • Acute CVA (cerebrovascular accident) ([address]) 02/23/2023   • Hypercalcemia 02/22/2023   • Discoloration of skin of foot 02/22/2023   • Abdominal pain 02/22/2023   • HFrEF (heart failure with reduced ejection fraction) ([address]) 02/13/2023   • Stroke ([address]) 02/12/2023   • HTN (hypertension) 02/12/2023   • Cardiomyopathy ([address]) 02/07/2023   • Vitamin D insufficiency 02/07/2023   • LAILA (acute kidney injury) ([address]) 04/23/2022   • Polypharmacy 04/23/2022   • 'light-for-dates' infant with signs of fetal malnutrition 04/22/2022   • Bipolar depression ([address]) 03/04/2022   • Rheumatoid arthritis of multiple sites with negative rheumatoid factor ([address]) 03/04/2022   • Closed fracture of multiple ribs of right side 03/02/2022   • Traumatic pneumothorax 03/02/2022   • Closed fracture of transverse process of lumbar vertebra ([address]) 03/02/2022   • Liver contusion 03/02/2022   • Fall 03/02/2022   • Acute pain due to trauma 03/02/2022   • Right shoulder pain 03/02/2022   • Severe protein-calorie malnutrition ([address]) 03/02/2022   • Undifferentiated connective tissue disease ([address]) 12/29/2021   • Primary generalized (osteo)arthritis 12/29/2021   • Peripheral neuropathy 12/29/2021   • Osteoporosis 12/29/2021   • Hx of compression fracture of spine 12/29/2021   • Lumbar spondylosis 12/29/2021   • Cervical spondylosis 12/29/2021   • Chronic pain syndrome    • Mild intermittent asthma without complication    • Ambulatory dysfunction    • Seronegative arthropathy of multiple sites ([address])    • Gastroesophageal reflux disease without esophagitis         LAURA Sweeney  Physical Medicine and [place]

## 2023-07-13 NOTE — PROGRESS NOTES
Internal Medicine Progress Note  Patient: Quirino Nash  Age/sex: 70 y.o. female  Medical Record #: [id number]      ASSESSMENT/PLAN: (Interval History)  Quirino Nash is seen and examined and management for following issues:    Left lateral thigh hematoma   • Stable  • Will watch hemoglobin     ABLA  • S/p 1 unit PRBCs 7/2/23     Thrush  • Continue Nystatin     Chronic combined systolic/diastolic heart failure   • Originally diagnosed 2004 when LVEF then was 10-15% and felt to be NICM  • Her LVEF did recover into 50's then fell when she had her CVA in 2/2023 to 30%  • Also has moderate AI/gr 2 DD  • Sees Dr Faiza Arthur a [facility] in 2/2023 when LVEF was found to be 30% which is most current  • Had been on Coreg in past but HF team switched her to Toprol when she had her stroke 2/2023  • Her soft BPs have limited starting her on ACEI/ARB as OP  • Home:  Toprol XL 25mg BID/Aldactone 25mg qd  • Here:  Toprol XL 25 mg BID/Aldactone 25mg qd  • Minipress was stopped on 7/8 since hadn't been getting 2/2 low BPs  • Toprol held last evening 7/12/23 for SBP 98  • Has some mild lower leg edema = will ACE wrap.   On no diuretic     Hypercalcemia  • Continue Cinacalcet here as was taking at home     HS hypoxia   • using 4L NC HS at home  • Is using O2 at night but amt not documented. Lj Guzman wrote order to place amt in chart     Nightmares  • placed on Minipress when here with us after her CVA  • D/w Dr Dave Tatum, d/c'd since had been too low to get     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  • Has residual left sided weakness  • Had recrudescence of CVA 4/2023 with worsening left facial droop  • Continue Eliquis     Dysphagia  • Had admission for aspiration PNA at [facility] 5/2023 tx'd with Levaquin and Flagyl  • Continue pureed diet with NTL as at home  • Continue Ensure HP pudding TID  • ST following     Bipolar depression  • Home:  Wellbutrin 100mg BID/Cymbalta 60mg BID/Lamictal 150mg qhs  • Here:  Wellbutrin 100mg BID/Cymbalta 60mg BID/Lamictal 150mg qhs     Rheumatoid arthritis  • Continue Plaquenil 200mg BID as at home     Chronic pain/chronic opioid use  • 2/2 RA and lumbar spondylosis  • Pain management per PMR     Hx incidental right superior hypophyseal artery aneurysm  • Follows with MAXIMO Courtney     CRE urine 3/2023  • Maintain contact isolation     Dye allergy   • Was dye prepped on 7/4/23 with Benadryl 50mg IV x 1 w/o issues     Incidental findings  • Pancolitis with increasing inflammatory free fluid in pelvis  • Calcified granuloma RLL     Hypokalemia  • Got Kdur 40 meq x 2 on 7/8/23  • Stable at 4.1     Enterococcus UTI  • on [facility] Depadua = sensitive        Discharge date:  7/21/23       The above assessment and plan was reviewed and updated as determined by my evaluation of the patient on 7/13/2023.     Labs:   Results from last 7 days   Lab Units 07/13/23  0648 07/10/23  0501   WBC Thousand/uL 5.95 7.64   HEMOGLOBIN g/dL 10.1* 9.3*   HEMATOCRIT % 32.3* 29.7*   PLATELETS Thousands/uL 295 330     Results from last 7 days   Lab Units 07/13/23  0648 07/10/23  0501   SODIUM mmol/L 140 143   POTASSIUM mmol/L 4.1 4.1   CHLORIDE mmol/L 112* 112*   CO2 mmol/L 26 26   BUN mg/dL 17 18   CREATININE mg/dL 0.83 0.91   CALCIUM mg/dL 8.4 8.1*                   Review of Scheduled Meds:  Current Facility-Administered Medications   Medication Dose Route Frequency Provider Last Rate   • acetaminophen  650 mg Oral Q6H Oscar Shipley MD     • albuterol  2 puff Inhalation Q6H PRN Nishi Rojas MD     • apixaban  5 mg Oral BID Nishi Rojas MD     • buPROPion  100 mg Oral BID Nishi Rojas MD     • cinacalcet  30 mg Oral Daily With Breakfast Nishi Rojas MD     • Diclofenac Sodium  2 g Topical 4x Daily PRN Nishi Rojas MD     • DULoxetine  60 mg Oral BID Nishi Rojas MD     • gabapentin  400 mg Oral BID Nishi Rojas MD     • hydroxychloroquine  200 mg Oral BID With Meals Nishi Rojas MD     • lamoTRIgine  150 mg Oral HS Neeraj Pandey MD     • lidocaine  1 patch Topical Daily LAURA Rutledge     • magnesium Oxide  400 mg Oral BID Neeraj Pandey MD     • melatonin  3 mg Oral HS Neeraj Pandey MD     • metoprolol succinate  25 mg Oral BID LAURA Che     • nitrofurantoin  100 mg Oral BID With Meals Neeraj Pandey MD     • nystatin  500,000 Units Swish & Spit 4x Daily Neeraj Pandey MD     • ondansetron  4 mg Oral Q6H PRN Neeraj Pandey MD     • oxyCODONE  5 mg Oral Q4H PRN Neeraj Pandey MD     • oxyCODONE  2.5 mg Oral Q4H PRN Neeraj Pandey MD     • pantoprazole  40 mg Oral Early Morning Neeraj Pandey MD     • simethicone  80 mg Oral 4x Daily (with meals and at bedtime) LAURA Che     • spironolactone  25 mg Oral Daily Neeraj Pandey MD         Subjective/ HPI: Patient seen and examined. Patients overnight issues or events were reviewed with nursing or staff during rounds or morning huddle session. New or overnight issues include the following:     No new or overnight issues. Offers no complaints    ROS:   A 10 point ROS was performed; negative except as noted above. Imaging:     No orders to display       *Labs /Radiology studies reviewed  *Medications reviewed and reconciled as needed  *Please refer to order section for additional ordered labs studies  *Case discussed with primary attending during morning huddle case rounds    Physical Examination:  Vitals:   Vitals:    07/12/23 2100 07/13/23 0600 07/13/23 0623 07/13/23 0700   BP: 98/72  109/69 104/68   BP Location: Left arm  Right arm Right arm   Pulse: 78  81 74   Resp: 16  16 16   Temp: 97.6 °F (36.4 °C)  97.5 °F (36.4 °C)    TempSrc: Oral  Oral    SpO2: 96%  96%    Weight:  55.6 kg (122 lb 9.2 oz)     Height:           General Appearance: no distress, conversive  HEENT:  External ear normal.  Nose normal w/o drainage. Mucous membranes are moist. Oropharynx is clear.  Conjunctiva clear w/o icterus or redness. Neck:  Supple, normal ROM  Lungs: BBS without crackles/wheeze/rhonchi; respirations unlabored with normal inspiratory/expiratory effort. No retractions noted. On RA  CV: regular rate and rhythm; no rubs/murmurs/gallops, PMI normal   ABD: Abdomen is soft. Bowel sounds all quadrants. Nontender with no distention. EXT: no edema  Skin: normal turgor, normal texture, no rashes  Psych: affect normal, mood normal  Neuro: AAO     The above physical exam was reviewed and updated as determined by my evaluation of the patient on 7/13/2023. Invasive Devices     Peripheral Intravenous Line  Duration           Long-Dwell Peripheral IV (Midline) 18/75/98 Right Basilic 9 days                   VTE Pharmacologic Prophylaxis: Eliquis  Code Status: Level 1 - Full Code  Current Length of Stay: 6 day(s)      Total time spent:  30 minutes with more than 50% spent counseling/coordinating care. Counseling includes discussion with patient re: progress  and discussion with patient of his/her current medical state/information. Coordination of patient's care was performed in conjunction with primary service. Time invested included review of patient's labs, vitals, and management of their comorbidities with continued monitoring. In addition, this patient was discussed with medical team including physician and advanced extenders. The care of the patient was extensively discussed and appropriate treatment plan was formulated unique for this patient. Medical decision making for the day was made by supervising physician unless otherwise noted in their attestation statement. ** Please Note:  voice to text software may have been used in the creation of this document.  Although proof errors in transcription or interpretation are a potential of such software**

## 2023-07-13 NOTE — PROGRESS NOTES
[address] INITIAL CONSULT  NOTE  Sravani Castaneda 70 y.o. Jose De Jesus Petros female MRN: [id number]  DOS:07/13/23   Unit/Bed#: -01 Encounter: [id number]      Requested by (Physician/Service): Javier Tiwari MD  Reason for Consultation: Evaluate and treat impact of mood and coping on progress in rehabilitation.     History of Present Illness: Sravani Castaneda is a 70 y.o. female with medical history of HTN, CVA in 2/2023 on eliquis (chronic dysphagia, L sided weakness, cognitive impairment), hypercalcemia,  CHF, GERD, SHAR, gout, bipolar disorder, RA on plaquenil, chronic opioid dependence on morphine for chronic back/joint pain, who presented to the [address] [place] [address] on 6/30/23 after a fall, unable to get up and disoriented. She thinks she tripped and fell down her porch steps, landing on he L thigh. Found to have a L lateral thigh hematoma with evidence of extravasation on imaging. Binder placed over hips, and eliquis was held but not reversed given her recent stroke. Speech consulted and noted oropharyngeal swallow likely at baseline and recommended Level 1/NTL. Recommended for VBS. Course c/b ABLA on 7/2 Hgb dropped to 6.8 and she was given 1 unit pRBC. Geriatrics was consulted, noted at baseline she is oriented but forgetful, with worsening issues since her stroke. Mini-Cog was 9/15 in 2021. They recommended checking B12. They noted she has not been on lorazepam since January, and also recommended weaning down off gabapentin if possible. On 7/4 patient had an unwitnessed fall while trying to reach for her bag on the window sill. No head strike, but landed on her L hip. GCS 15, Neuro exam was baseline. CTA ordered to look for active bleeding, which showed decreased size of hematoma with no active extravasation. Hgb did drop after her transfusion, but seems to have stablized today at 8.5.  That being said, her HR was elevated today, and it appears a troponin has been ordered by the trauma team as well as a CXR. CXR showed hazy RLL opacity, likely 2/2 atelectasis, and trops trended down. Metoprolol was increased with improvement.  She was admitted to the [facility] on 7/7/23.        HISTORY:     Patient Active Problem List    Diagnosis Date Noted   • Cervical dystonia 07/11/2023   • Sundowning 07/09/2023   • Thrush 07/07/2023   • Night terrors 07/07/2023   • Leg hematoma, left, initial encounter 07/01/2023   • Cerebral aneurysm, nonruptured 05/15/2023   • Nocturnal hypoxia 05/01/2023   • Stroke-like symptoms 05/01/2023   • Insomnia 04/03/2023   • Hypomagnesemia 03/30/2023   • Sinus tachycardia 03/29/2023   • Hypokalemia 03/10/2023   • AMS (altered mental status) 03/09/2023   • Gout of foot 03/09/2023   • Dysphagia 03/09/2023   • Wound of left ankle 03/09/2023   • Urinary tract infection without hematuria 03/06/2023   • Obstructive sleep apnea 02/28/2023   • History of stroke 02/28/2023   • History of bacteremia 02/24/2023   • Acute CVA (cerebrovascular accident) ([address]) 02/23/2023   • Hypercalcemia 02/22/2023   • Discoloration of skin of foot 02/22/2023   • Abdominal pain 02/22/2023   • HFrEF (heart failure with reduced ejection fraction) ([address]) 02/13/2023   • Stroke ([address]) 02/12/2023   • HTN (hypertension) 02/12/2023   • Cardiomyopathy ([address]) 02/07/2023   • Vitamin D insufficiency 02/07/2023   • LAILA (acute kidney injury) ([address]) 04/23/2022   • Polypharmacy 04/23/2022   • 'light-for-dates' infant with signs of fetal malnutrition 04/22/2022   • Bipolar depression ([address]) 03/04/2022   • Rheumatoid arthritis of multiple sites with negative rheumatoid factor ([address]) 03/04/2022   • Closed fracture of multiple ribs of right side 03/02/2022   • Traumatic pneumothorax 03/02/2022   • Closed fracture of transverse process of lumbar vertebra ([address]) 03/02/2022   • Liver contusion 03/02/2022   • Fall 03/02/2022   • Acute pain due to trauma 03/02/2022   • Right shoulder pain 03/02/2022   • Severe protein-calorie malnutrition ([address]) 03/02/2022   • Undifferentiated connective tissue disease ([address]) 12/29/2021   • Primary generalized (osteo)arthritis 12/29/2021   • Peripheral neuropathy 12/29/2021   • Osteoporosis 12/29/2021   • Hx of compression fracture of spine 12/29/2021   • Lumbar spondylosis 12/29/2021   • Cervical spondylosis 12/29/2021   • Anemia 06/26/2021   • Chronic pain syndrome    • Mild intermittent asthma without complication    • Ambulatory dysfunction    • Seronegative arthropathy of multiple sites (HCC)    • Gastroesophageal reflux disease without esophagitis    • Mixed stress and urge urinary incontinence    • CHF (congestive heart failure) ([address])        Body mass index is 21.71 kg/m². Past Medical History:     Past Medical History:   Diagnosis Date   • Anxiety    • Asthma    • Bipolar depression ([address])    • Chronic narcotic dependence ([address])    • Chronic pain    • Depression    • Enterovirus heart infection    • SHAR (obstructive sleep apnea)    • Osteoarthritis    • Peripheral neuropathy    • Plantar fasciitis of right foot    • Senile osteoporosis    • Seronegative arthropathy of multiple sites ([address])    • Undifferentiated connective tissue disease (HCC)         Past Surgical History:     Past Surgical History:   Procedure Laterality Date   • BREAST IMPLANT     • FIXATION KYPHOPLASTY LUMBAR SPINE     • HYSTERECTOMY     • INCONTINENCE SURGERY N/A    • IR STROKE ALERT  2/12/2023   • NASAL SEPTOPLASTY W/ TURBINOPLASTY N/A    • RECTAL PROLAPSE REPAIR N/A    • REPAIR RECTOCELE     • RHINOPLASTY N/A          Allergies:      Allergies   Allergen Reactions   • Cephalosporins Other (See Comments)     rash   • Iodine - Food Allergy Anaphylaxis     Anaphylaxis     • Methocarbamol Hives   • Sulfa Antibiotics Hives   • Bactrim [Sulfamethoxazole-Trimethoprim] Hives   • Erythromycin Hives   • Penicillins    • Shrimp Extract Allergy Skin Test - Food Allergy Other (See Comments)   • Trimethoprim Hives         Social History: Social History     Socioeconomic History   • Marital status:      Spouse name: None   • Number of children: None   • Years of education: None   • Highest education level: None   Occupational History   • None   Tobacco Use   • Smoking status: Former   • Smokeless tobacco: Never   Vaping Use   • Vaping Use: Never used   Substance and Sexual Activity   • Alcohol use: Not Currently     Comment: quit   • Drug use: Not Currently     Types: Marijuana     Comment: has medical card   • Sexual activity: None   Other Topics Concern   • None   Social History Narrative   • None     Social Determinants of Health     Financial Resource Strain: Not on file   Food Insecurity: No Food Insecurity (7/3/2023)    Hunger Vital Sign    • Worried About Running Out of Food in the Last Year: Never true    • Ran Out of Food in the Last Year: Never true   Transportation Needs: No Transportation Needs (7/3/2023)    PRAPARE - Transportation    • Lack of Transportation (Medical): No    • Lack of Transportation (Non-Medical):  No   Physical Activity: Not on file   Stress: Not on file   Social Connections: Not on file   Intimate Partner Violence: Not on file   Housing Stability: Low Risk  (7/3/2023)    Housing Stability Vital Sign    • Unable to Pay for Housing in the Last Year: No    • Number of Places Lived in the Last Year: 1    • Unstable Housing in the Last Year: No        Family History:    Family History   Problem Relation Age of Onset   • No Known Problems Mother    • No Known Problems Father    • Arthritis Family    • Diabetes Family    • Stroke Family        Medications:     Current Facility-Administered Medications:   •  acetaminophen (TYLENOL) tablet 650 mg, 650 mg, Oral, Q6H [address], Lula Lacy MD, 650 mg at 07/13/23 1209  •  albuterol (PROVENTIL HFA,VENTOLIN HFA) inhaler 2 puff, 2 puff, Inhalation, Q6H PRN, Lula Lacy MD  •  apixaban (ELIQUIS) tablet 5 mg, 5 mg, Oral, BID, Lula Lacy MD, 5 mg at 07/13/23 0814  • buPROPion [facility]) tablet 100 mg, 100 mg, Oral, BID, Ronal Kinney MD, 100 mg at 07/13/23 0815  •  cinacalcet (SENSIPAR) tablet 30 mg, 30 mg, Oral, Daily With Breakfast, Ronal Kinney MD, 30 mg at 07/13/23 0815  •  Diclofenac Sodium (VOLTAREN) 1 % topical gel 2 g, 2 g, Topical, 4x Daily PRN, Ronal Kinney MD  •  DULoxetine (CYMBALTA) delayed release capsule 60 mg, 60 mg, Oral, BID, Ronal Kinney MD, 60 mg at 07/13/23 0813  •  gabapentin (NEURONTIN) capsule 400 mg, 400 mg, Oral, BID, Ronal Kinney MD, 400 mg at 07/13/23 0730  •  hydroxychloroquine (PLAQUENIL) tablet 200 mg, 200 mg, Oral, BID With Meals, Ronal Kinney MD, 200 mg at 07/13/23 0815  •  lamoTRIgine (LaMICtal) tablet 150 mg, 150 mg, Oral, HS, Ronal Kinney MD, 150 mg at 07/12/23 2130  •  lidocaine (LIDODERM) 5 % patch 1 patch, 1 patch, Topical, Daily, LAURA Rutledge, 1 patch at 07/13/23 4492  •  magnesium Oxide (MAG-OX) tablet 400 mg, 400 mg, Oral, BID, Ronal Kinney MD, 400 mg at 07/13/23 0916  •  melatonin tablet 3 mg, 3 mg, Oral, HS, Ronal Kinney MD, 3 mg at 07/12/23 2140  •  metoprolol succinate (TOPROL-XL) 24 hr tablet 25 mg, 25 mg, Oral, BID, LAURA Casillas, 25 mg at 07/13/23 0813  •  nitrofurantoin (MACROBID) extended-release capsule 100 mg, 100 mg, Oral, BID With Meals, Ronal Kinney MD, 100 mg at 07/13/23 0815  •  nystatin (MYCOSTATIN) oral suspension 500,000 Units, 500,000 Units, Swish & Spit, 4x Daily, LAURA Rutledge, 500,000 Units at 07/13/23 1212  •  ondansetron (ZOFRAN-ODT) dispersible tablet 4 mg, 4 mg, Oral, Q6H PRN, Ronal Kinney MD, 4 mg at 07/09/23 1431  •  oxyCODONE (ROXICODONE) IR tablet 5 mg, 5 mg, Oral, Q4H PRN, Ronal Kinney MD, 5 mg at 07/12/23 2048  •  oxyCODONE (ROXICODONE) split tablet 2.5 mg, 2.5 mg, Oral, Q4H PRN, Ronal Kinney MD, 2.5 mg at 07/10/23 1438  •  pantoprazole (PROTONIX) EC tablet 40 mg, 40 mg, Oral, Early Morning, Ronal Kinney MD, 40 mg at 07/13/23 0553  • simethicone (MYLICON) chewable tablet 80 mg, 80 mg, Oral, 4x Daily (with meals and at bedtime), LAURA Roy, 80 mg at 07/13/23 1209  •  spironolactone (ALDACTONE) tablet 25 mg, 25 mg, Oral, Daily, Huy Huerta MD, 25 mg at 07/13/23 0815    ASSESSMENT:   Bob Arreguin is a very pleasant and engaging 70year old  female who was admitted to [address] [place] [address] on 7/7/23 to regain strength and functioning s/p left leg hematoma  Sustained from an unwitnessed mechanical fall on 6/30/23. Pt reports that she doesn't really understand how she fell but knows that she fell through her kitchen door onto the porch in the middle of the night and remained on the floor, presumably unconscious, for several hours until her son and his girlfriend woke up and found her on the floor. She states that the fall was so bad she was not able to get up and had no medical alert device on her. She states that she woke up in the middle of the night and must have been disoriented because she believed she had to go to work and then went into the kitchen to leave, but fell. Pt reports that she has very vivid dreams that stay with her when she wakes up and she has trouble shaking it off. She believes the vivid dreams cause her to become disoriented when she wakes up and states that they started shortly after she was prescribed "minipress" (Prazosin) by a Psychiatrist for night terrors. She states she knows the "minipress" was originally used for hypertension but it also works for nightmares. Pt reports she has had multiple falls which may have increased since starting the Prazosin. She reports the nightmares have diminished significantly to the point where she just has vivid dreams which leave her disoriented. She is not sure which is worse. Pt states she has had "nightmares" since childhood and has never been able to overcome them. She denies any prior sleep medicine evaluations.   She states the nightmares have significantly interfered with restorative sleep and have been a major problem her entire life. She is also afraid of the dark and has trouble falling asleep without lights on. She denies any experience of flight of ideas, fluctuations in mood, hallucinations, delusions. There is notation of bipolar in her medical records in 2022. Pt reports she has been under the care of Psychiatry for depression and anxiety. (Current Psychiatry medications are Wellbutrin and Cymbalta). She denies a history of trauma or abuse. Denies PTSD signs and symptoms. She describes a close and supportive relationship with her two children. Her middle sone passed away several years ago. Pt  maintained good eye contact and engaged appropriately throughout the interview. She was seen in her room in a wheelchair, presented as pleasant,  cooperative and established rapport without difficulty. Her interpersonal style was warm and interactive. Mood ranged from sad to positive and was appropriate to context. She feels very sad that she is "back in the hospital"  "for another fall" and does not want to be a burden to her family. She also feels frustrated with herself and does not want to keep falling. She denies euphoria or emotional lability. She denies disturbances in appetite. No evidence of poor boundaries was present. No pressured speech, repetitive or perseverative behavior is present. No obsessive-compulsive or associated rituals. Pt's conversational speech was fluent and articulate with no evidence of word finding difficulty. In summary Ms. Ninetta Goldberg presents with a history of anxiety and depression, for which she has been treated. She has a lifelong history of "nightmares" which interfere with restorative sleep and put her at risk for multiple co-morbidities. A Sleep Medicine evaluation and Neurology evaluation should be considered to help determine underlying causes and potential treatments. The potentially contributing role of medication interactions should be carefully evaluated. Pt may benefit by supportive psychotherapy during rehab to help process emotions and implement new coping strategies. CBT and DBT techniques to help manage emotions, tolerate distress and employ effective coping will be presented. DIAGNOSIS:  R/O Primary Insomnia  Nightmare Disorder  Major Depressive Disorder, Chronic, Mild  Generalized Anxiety Disorder  Nyctophobia      RECOMMENDATIONS:   I will follow Ms. Twyla Zepeda during her stay to provide the following interventions:    · Supportive psychotherapy, utilizing CBT and mindfulness strategies  · DBT distress tolerance techniques  to improve coping and mood  · Meditation and relaxation training and sleep hygiene as tolerated          Thank you for the opportunity to participate in Ms. Pierson's care. Edgar Mchugh.  Teodora Daigle, Ph.D.  Licensed Psychologist

## 2023-07-13 NOTE — PROGRESS NOTES
07/13/23 0930   Pain Assessment   Pain Assessment Tool 0-10   Pain Score 6   Pain Location/Orientation Orientation: Left; Location: Leg;Location: Back   Pain Onset/Description Onset: Ongoing   Restrictions/Precautions   Precautions Aspiration;Bed/chair alarms;Cognitive; Fall Risk;Pain;Supervision on toilet/commode;Visual deficit  (L visual field deficit)   Weight Bearing Restrictions No   ROM Restrictions No   Cognition   Overall Cognitive Status Impaired   Arousal/Participation Alert;Arousable   Attention Attends with cues to redirect   Orientation Level Oriented X4   Memory Decreased short term memory;Decreased recall of recent events;Decreased recall of precautions   Following Commands Follows one step commands with increased time or repetition   Subjective   Subjective Patient reports she is tired today   Sit to Stand   Type of Assistance Needed Incidental touching   Comment CGA with RW from [facility]. multiple trials performed throughout the session. Sit to Stand CARE Score 4   Bed-Chair Transfer   Type of Assistance Needed Incidental touching   Comment CGA with RW   Chair/Bed-to-Chair Transfer CARE Score 4   Transfer Bed/Chair/Wheelchair   Limitations Noted In Balance; Coordination;Pain Management;Problem Solving; Sequencing;UE Strength;LE Strength;Vision   Adaptive Equipment Roller Walker   Car Transfer   Type of Assistance Needed Physical assistance   Physical Assistance Level 25% or less   Comment Fei required assist to management LEs   Car Transfer CARE Score 3   Walk 10 Feet   Type of Assistance Needed Incidental touching; Adaptive equipment   Comment CS/CGA with RW, verbal cues to stay in RW frame   Walk 10 Feet CARE Score 4   Walk 50 Feet with Two Turns   Type of Assistance Needed Incidental touching   Comment CGA with RW   Walk 50 Feet with Two Turns CARE Score 4   Walk 150 Feet   Comment unable to complete 150 ft secondary to fatigue and pain   Reason if not Attempted Safety concerns   Walk 150 Feet CARE Score 88   Walking 10 Feet on Uneven Surfaces   Type of Assistance Needed Physical assistance   Physical Assistance Level 25% or less   Comment Fei with RW over mat surface, VC to navigate over lip of mat   Walking 10 Feet on Uneven Surfaces CARE Score 3   Ambulation   Primary Mode of Locomotion Prior to Admission Walk   Distance Walked (feet) 130 ft  (2x 65 ft, 110 ft,)   Assist Device Roller Walker   Gait Pattern Slow Amy; Inconsistant Amy;Narrow AYLA; Forward Flexion; Step through; Improper weight shift   Limitations Noted In Balance; Endurance;Device Management; Heel Strike;Posture; Safety;Speed;Strength;Swing   Walk Assist Level Contact Guard   Does the patient walk? 2. Yes   Curb or Single Stair   Style negotiated Curb   Type of Assistance Needed Incidental touching; Adaptive equipment   Comment CGA over 6 inch curb x4 trials with RW   1 Step (Curb) CARE Score 4   4 Steps   Comment only able to complete 2 stair prior to needing rest break 2/2 to fatigue   Reason if not Attempted Safety concerns   4 Steps CARE Score 88   Stairs   Type Stairs   # of Steps 2  (x2 trials)   Weight Bearing Precautions Fall Risk   Assist Devices Bilateral Rail   Findings Step to pattern, CGA for safety   Toilet Transfer   Type of Assistance Needed Incidental touching   Comment CGA with RW, BSC over toilet   Toilet Transfer CARE Score 4   Therapeutic Interventions   Strengthening Seated LAQ 1.5# 3x12 each LE, seated hamstring curl with yellow theraband 3x15, seated hip adduction 3x15, seated hip abduction with yellow TB 3x15   Assessment   Treatment Assessment Patient participated in 90 minute skilled physical therapy session focusing on transfers, gait training, curb navigation, and LE strengthening. Patient with limited ambulation tolerance 2/2 to fatigue and pt reporting UE fatigue/pain as pt relies heavily on UEs during ambulation. She continues to require VCs for safety when turning with RW.  Patient would benefit from continued PT to work on [employer], curb/single step management with RW, standing balance, and gait training. Problem List Decreased strength;Decreased endurance; Impaired balance;Decreased mobility;Pain;Decreased skin integrity   Barriers to Discharge Inaccessible home environment;Decreased caregiver support   PT Barriers   Functional Limitation Transfers; Wheelchair management; Walking;Stair negotiation;Car transfers   Plan   Treatment/Interventions Functional transfer training;LE strengthening/ROM; Therapeutic exercise; Endurance training;Elevations; Patient/family training;Equipment eval/education; Bed mobility;Gait training   Progress Progressing toward goals   Recommendation   PT Discharge Recommendation Home with home health rehabilitation   Equipment Recommended Walker   PT Therapy Minutes   PT Time In 0930   PT Time Out 1100   PT Total Time (minutes) 90   PT Mode of treatment - Individual (minutes) 90   PT Mode of treatment - Concurrent (minutes) 0   PT Mode of treatment - Group (minutes) 0   PT Mode of treatment - Co-treat (minutes) 0   PT Mode of Treatment - Total time(minutes) 90 minutes   PT Cumulative Minutes 340   Therapy Time missed   Time missed?  No     Clnit Mitchell, PT, DPT

## 2023-07-13 NOTE — PROGRESS NOTES
OT Daily Treatment Note       07/13/23 1230   Pain Assessment   Pain Assessment Tool 0-10   Pain Score 6   Pain Location/Orientation Location: Back   Hospital Pain Intervention(s) Repositioned   Restrictions/Precautions   Precautions Aspiration;Bed/chair alarms;Cognitive; Fall Risk;Impulsive;Supervision on toilet/commode;Pain   Lifestyle   Autonomy "I like doing things that make me feel successful"   Lower Body Dressing   Comment as per request of CRNP Rody, OT ace wrapped BLE utilizing figure 8 technique for edema mgmt. Sit to Stand   Type of Assistance Needed Incidental touching   Physical Assistance Level No physical assistance   Comment RW   Sit to Stand CARE Score 4   Bed-Chair Transfer   Type of Assistance Needed Incidental touching   Physical Assistance Level No physical assistance   Comment SPT with RW   Chair/Bed-to-Chair Transfer CARE Score 4   Cognition   Overall Cognitive Status Impaired   Arousal/Participation Alert;Arousable   Attention Attends with cues to redirect   Orientation Level Oriented X4   Memory Decreased short term memory;Decreased recall of recent events;Decreased recall of precautions   Following Commands Follows one step commands with increased time or repetition   Comments Pt completed [place] Cognitive Assessment (MoCA) version 8.1. Pt scored overall 20 / 30  indicating a mild cognitive deficit. See below for details. Additional Activities   Additional Activities Other (Comment)   Additional Activities Comments engaged in [employer] training session focusing on visual perceptual training with L attention, LUE ROM, higher cog retraining with memory and attention. pt able to complete with min vc for increasing overall ROM to complete activity. during card memory activity, pt able to complete 2 card memory independently but when task graded up to 4 cards, pt unable to memorize cards, requiring max A to complete. pt thoroughly enjoys these activities.    Activity Tolerance   Activity Tolerance Patient tolerated treatment well   Assessment   Treatment Assessment Pt participated in skilled OT session with focus on functional transfer training, UE strengthening/ROM, endurance training, cognitive reorientation, compensatory technique education and activity engagement . See flowsheet for details of session and current functional status. Pt is limited by weakness, decreased ROM, impaired balance, decreased endurance, increased fall risk, decreased ADLS, decreased IADLS, pain, decreased activity tolerance, decreased safety awareness, impaired judgement, decreased cognition and decreased strength and requires skilled OT services to increase independence and safety with ADL completion in prep for DC home. Plan to continue ADL retraining, functional transfer training, UE strengthening/ROM, endurance training, cognitive reorientation, Pt/caregiver education, compensatory technique education, continued education, energy conservation and activity engagement  to address barriers mentioned above. Prognosis Fair   Problem List Decreased strength;Decreased endurance; Impaired balance;Decreased mobility;Pain;Decreased skin integrity   Barriers to Discharge Inaccessible home environment;Decreased caregiver support   Plan   Treatment/Interventions ADL retraining;Functional transfer training; Therapeutic exercise; Endurance training;Cognitive reorientation;Patient/family training; Compensatory technique education   Progress Progressing toward goals   OT Therapy Minutes   OT Time In 1230   OT Time Out 1400   OT Total Time (minutes) 90   OT Mode of treatment - Individual (minutes) 90   OT Mode of treatment - Concurrent (minutes) 0   OT Mode of treatment - Group (minutes) 0   OT Mode of treatment - Co-treat (minutes) 0   OT Mode of Treatment - Total time(minutes) 90 minutes   OT Cumulative Minutes 488   Therapy Time missed   Time missed?  No           Visuospatial/executive: 1 /5   Naming: 3/3   Memory:  (worth no points) 5/5 (1st trial), 5/5 (2nd trial)  Attention:  5/ 6   Language: 2 / 3   Abstraction: 1 / 2   Delayed recall: 3 / 5    Memory Index Score (MIS): 13/15  Orientation: 5 / 6     +1 point given for less than or equal to 12 years of education? No      Total score 20/30, indicating a mild cognitive deficit. MoCA certified rater ID: [id number]    Lisbeth Bland, MS OTR/L, CSRS

## 2023-07-13 NOTE — PLAN OF CARE
Problem: PAIN - ADULT  Goal: Verbalizes/displays adequate comfort level or baseline comfort level  Description: Interventions:  - Encourage patient to monitor pain and request assistance  - Assess pain using appropriate pain scale  - Administer analgesics based on type and severity of pain and evaluate response  - Implement non-pharmacological measures as appropriate and evaluate response  - Consider cultural and social influences on pain and pain management  - Notify physician/advanced practitioner if interventions unsuccessful or patient reports new pain  Outcome: Progressing     Problem: INFECTION - ADULT  Goal: Absence or prevention of progression during hospitalization  Description: INTERVENTIONS:  - Assess and monitor for signs and symptoms of infection  - Monitor lab/diagnostic results  - Monitor all insertion sites, i.e. indwelling lines, tubes, and drains  - Monitor endotracheal if appropriate and nasal secretions for changes in amount and color  - [place] appropriate cooling/warming therapies per order  - Administer medications as ordered  - Instruct and encourage patient and family to use good hand hygiene technique  - Identify and instruct in appropriate isolation precautions for identified infection/condition  Outcome: Progressing  Goal: Absence of fever/infection during neutropenic period  Description: INTERVENTIONS:  - Monitor WBC    Outcome: Progressing     Problem: SAFETY ADULT  Goal: Patient will remain free of falls  Description: INTERVENTIONS:  - Educate patient/family on patient safety including physical limitations  - Instruct patient to call for assistance with activity   - Consult OT/PT to assist with strengthening/mobility   - Keep Call bell within reach  - Keep bed low and locked with side rails adjusted as appropriate  - Keep care items and personal belongings within reach  - Initiate and maintain comfort rounds  - Make Fall Risk Sign visible to staff  - Offer Toileting every 2 Hours, in advance of need  - Initiate/Maintain bed/chair alarm  - Obtain necessary fall risk management equipment: nonskid socks  - Apply yellow socks and bracelet for high fall risk patients  - Consider moving patient to room near nurses station  Outcome: Progressing  Goal: Maintain or return to baseline ADL function  Description: INTERVENTIONS:  -  Assess patient's ability to carry out ADLs; assess patient's baseline for ADL function and identify physical deficits which impact ability to perform ADLs (bathing, care of mouth/teeth, toileting, grooming, dressing, etc.)  - Assess/evaluate cause of self-care deficits   - Assess range of motion  - Assess patient's mobility; develop plan if impaired  - Assess patient's need for assistive devices and provide as appropriate  - Encourage maximum independence but intervene and supervise when necessary  - Involve family in performance of ADLs  - Assess for home care needs following discharge   - Consider OT consult to assist with ADL evaluation and planning for discharge  - Provide patient education as appropriate  Outcome: Progressing  Goal: Maintains/Returns to pre admission functional level  Description: INTERVENTIONS:  - Perform BMAT or MOVE assessment daily.   - Set and communicate daily mobility goal to care team and patient/family/caregiver. - Collaborate with rehabilitation services on mobility goals if consulted  - Perform Range of Motion 3 times a day. - Reposition patient every 2 hours.   - Dangle patient 3 times a day  - Stand patient 3 times a day  - Ambulate patient 3 times a day  - Out of bed to chair 3 times a day   - Out of bed for meals 3 times a day  - Out of bed for toileting  - Record patient progress and toleration of activity level   Outcome: Progressing     Problem: DISCHARGE PLANNING  Goal: Discharge to home or other facility with appropriate resources  Description: INTERVENTIONS:  - Identify barriers to discharge w/patient and caregiver  - Arrange for needed discharge resources and transportation as appropriate  - Identify discharge learning needs (meds, wound care, etc.)  - Arrange for interpretive services to assist at discharge as needed  - Refer to Case Management Department for coordinating discharge planning if the patient needs post-hospital services based on physician/advanced practitioner order or complex needs related to functional status, cognitive ability, or social support system  Outcome: Progressing     Problem: Prexisting or High Potential for Compromised Skin Integrity  Goal: Skin integrity is maintained or improved  Description: INTERVENTIONS:  - Identify patients at risk for skin breakdown  - Assess and monitor skin integrity  - Assess and monitor nutrition and hydration status  - Monitor labs   - Assess for incontinence   - Turn and reposition patient  - Assist with mobility/ambulation  - Relieve pressure over bony prominences  - Avoid friction and shearing  - Provide appropriate hygiene as needed including keeping skin clean and dry  - Evaluate need for skin moisturizer/barrier cream  - Collaborate with interdisciplinary team   - Patient/family teaching  - Consider wound care consult   Outcome: Progressing     Problem: Nutrition/Hydration-ADULT  Goal: Nutrient/Hydration intake appropriate for improving, restoring or maintaining nutritional needs  Description: Monitor and assess patient's nutrition/hydration status for malnutrition. Collaborate with interdisciplinary team and initiate plan and interventions as ordered. Monitor patient's weight and dietary intake as ordered or per policy. Utilize nutrition screening tool and intervene as necessary. Determine patient's food preferences and provide high-protein, high-caloric foods as appropriate.      INTERVENTIONS:  - Monitor oral intake, urinary output, labs, and treatment plans  - Assess nutrition and hydration status and recommend course of action  - Evaluate amount of meals eaten  - Assist patient with eating if necessary   - Allow adequate time for meals  - Recommend/ encourage appropriate diets, oral nutritional supplements, and vitamin/mineral supplements  - Order, calculate, and assess calorie counts as needed  - Recommend, monitor, and adjust tube feedings and TPN/PPN based on assessed needs  - Assess need for intravenous fluids  - Provide specific nutrition/hydration education as appropriate  - Include patient/family/caregiver in decisions related to nutrition  Outcome: Progressing     Problem: MOBILITY - ADULT  Goal: Maintain or return to baseline ADL function  Description: INTERVENTIONS:  -  Assess patient's ability to carry out ADLs; assess patient's baseline for ADL function and identify physical deficits which impact ability to perform ADLs (bathing, care of mouth/teeth, toileting, grooming, dressing, etc.)  - Assess/evaluate cause of self-care deficits   - Assess range of motion  - Assess patient's mobility; develop plan if impaired  - Assess patient's need for assistive devices and provide as appropriate  - Encourage maximum independence but intervene and supervise when necessary  - Involve family in performance of ADLs  - Assess for home care needs following discharge   - Consider OT consult to assist with ADL evaluation and planning for discharge  - Provide patient education as appropriate  Outcome: Progressing  Goal: Maintains/Returns to pre admission functional level  Description: INTERVENTIONS:  - Perform BMAT or MOVE assessment daily.   - Set and communicate daily mobility goal to care team and patient/family/caregiver. - Collaborate with rehabilitation services on mobility goals if consulted  - Perform Range of Motion 3 times a day. - Reposition patient every 2 hours.   - Dangle patient 3 times a day  - Stand patient 3 times a day  - Ambulate patient 3 times a day  - Out of bed to chair 3 times a day   - Out of bed for meals 3 times a day  - Out of bed for toileting  - Record patient progress and toleration of activity level   Outcome: Progressing

## 2023-07-14 PROCEDURE — 92526 ORAL FUNCTION THERAPY: CPT

## 2023-07-14 PROCEDURE — 97110 THERAPEUTIC EXERCISES: CPT

## 2023-07-14 PROCEDURE — 99232 SBSQ HOSP IP/OBS MODERATE 35: CPT

## 2023-07-14 PROCEDURE — 97530 THERAPEUTIC ACTIVITIES: CPT

## 2023-07-14 PROCEDURE — 99232 SBSQ HOSP IP/OBS MODERATE 35: CPT | Performed by: INTERNAL MEDICINE

## 2023-07-14 RX ORDER — MORPHINE SULFATE 15 MG/1
7.5 TABLET ORAL EVERY 12 HOURS PRN
Status: DISCONTINUED | OUTPATIENT
Start: 2023-07-14 | End: 2023-07-18

## 2023-07-14 RX ADMIN — ACETAMINOPHEN 650 MG: 325 TABLET, FILM COATED ORAL at 17:55

## 2023-07-14 RX ADMIN — SIMETHICONE 80 MG: 80 TABLET, CHEWABLE ORAL at 09:41

## 2023-07-14 RX ADMIN — MELATONIN TAB 3 MG 3 MG: 3 TAB at 21:23

## 2023-07-14 RX ADMIN — HYDROXYCHLOROQUINE SULFATE 200 MG: 200 TABLET ORAL at 17:57

## 2023-07-14 RX ADMIN — HYDROXYCHLOROQUINE SULFATE 200 MG: 200 TABLET ORAL at 09:44

## 2023-07-14 RX ADMIN — DULOXETINE HYDROCHLORIDE 60 MG: 60 CAPSULE, DELAYED RELEASE ORAL at 17:55

## 2023-07-14 RX ADMIN — SIMETHICONE 80 MG: 80 TABLET, CHEWABLE ORAL at 18:11

## 2023-07-14 RX ADMIN — SIMETHICONE 80 MG: 80 TABLET, CHEWABLE ORAL at 21:22

## 2023-07-14 RX ADMIN — NYSTATIN 500000 UNITS: 100000 SUSPENSION ORAL at 13:21

## 2023-07-14 RX ADMIN — BUPROPION HYDROCHLORIDE 100 MG: 100 TABLET, FILM COATED ORAL at 09:43

## 2023-07-14 RX ADMIN — METOPROLOL SUCCINATE 25 MG: 25 TABLET, FILM COATED, EXTENDED RELEASE ORAL at 21:23

## 2023-07-14 RX ADMIN — ACETAMINOPHEN 650 MG: 325 TABLET, FILM COATED ORAL at 13:22

## 2023-07-14 RX ADMIN — NYSTATIN 500000 UNITS: 100000 SUSPENSION ORAL at 17:54

## 2023-07-14 RX ADMIN — LIDOCAINE 5% 1 PATCH: 700 PATCH TOPICAL at 09:41

## 2023-07-14 RX ADMIN — MAGNESIUM OXIDE TAB 400 MG (241.3 MG ELEMENTAL MG) 400 MG: 400 (241.3 MG) TAB at 17:54

## 2023-07-14 RX ADMIN — SIMETHICONE 80 MG: 80 TABLET, CHEWABLE ORAL at 13:20

## 2023-07-14 RX ADMIN — ACETAMINOPHEN 650 MG: 325 TABLET, FILM COATED ORAL at 00:56

## 2023-07-14 RX ADMIN — MAGNESIUM OXIDE TAB 400 MG (241.3 MG ELEMENTAL MG) 400 MG: 400 (241.3 MG) TAB at 09:42

## 2023-07-14 RX ADMIN — GABAPENTIN 400 MG: 400 CAPSULE ORAL at 09:42

## 2023-07-14 RX ADMIN — NITROFURANTOIN (MONOHYDRATE/MACROCRYSTALS) 100 MG: 75; 25 CAPSULE ORAL at 09:44

## 2023-07-14 RX ADMIN — GABAPENTIN 400 MG: 400 CAPSULE ORAL at 17:55

## 2023-07-14 RX ADMIN — NYSTATIN 500000 UNITS: 100000 SUSPENSION ORAL at 09:41

## 2023-07-14 RX ADMIN — APIXABAN 5 MG: 5 TABLET, FILM COATED ORAL at 17:55

## 2023-07-14 RX ADMIN — MORPHINE SULFATE 7.5 MG: 15 TABLET ORAL at 14:06

## 2023-07-14 RX ADMIN — CINACALCET 30 MG: 30 TABLET, FILM COATED ORAL at 09:43

## 2023-07-14 RX ADMIN — DULOXETINE HYDROCHLORIDE 60 MG: 60 CAPSULE, DELAYED RELEASE ORAL at 09:41

## 2023-07-14 RX ADMIN — NYSTATIN 500000 UNITS: 100000 SUSPENSION ORAL at 21:23

## 2023-07-14 RX ADMIN — APIXABAN 5 MG: 5 TABLET, FILM COATED ORAL at 09:42

## 2023-07-14 RX ADMIN — BUPROPION HYDROCHLORIDE 100 MG: 100 TABLET, FILM COATED ORAL at 17:57

## 2023-07-14 RX ADMIN — PANTOPRAZOLE SODIUM 40 MG: 40 TABLET, DELAYED RELEASE ORAL at 06:14

## 2023-07-14 RX ADMIN — LAMOTRIGINE 150 MG: 100 TABLET ORAL at 21:23

## 2023-07-14 RX ADMIN — ACETAMINOPHEN 650 MG: 325 TABLET, FILM COATED ORAL at 06:14

## 2023-07-14 RX ADMIN — ONDANSETRON 4 MG: 4 TABLET, ORALLY DISINTEGRATING ORAL at 13:37

## 2023-07-14 NOTE — CASE MANAGEMENT
Met w/pt and dtr Melanie Edwards, reviewed team update and informed of dc scheduled for 7/21. Pt and Porter in agreement. Cm reviewed recommendations for [facility] services for therapy and Melanie Edwards confirmed they have had [facility] in the past. Pt in agreement with [facility] again. CM confirmed pts son and girlfriend still reside in the home, Melanie Edwards said yes. Sons girlfriend does assist pt. Cm asked where her bedroom was in relation to pts, porter stated it is upstairs. Melanie Edwards reported they are placing a lock on the door so pt cannot exit alone, and she is looking into a bed alarm so she is heard getting up in the middle of the night. Melanie Edwards will inform her brother of the dc plan. Referral made via aidin to [facility] for pt ot and speech services.

## 2023-07-14 NOTE — PROGRESS NOTES
07/14/23 1200   Pain Assessment   Pain Assessment Tool 0-10   Pain Score No Pain   Restrictions/Precautions   Precautions Aspiration;Cognitive;Bed/chair alarms; Fall Risk;Supervision on toilet/commode   Speech/Swallow Mechanism Exam   Volitional Cough Weak   Vocal Quality Hoarse/ Harsh   Respratory Status Nasal Cannula  (at night)   Swallow Information   Current Risks for Dysphagia & Aspiration Weak cough;Weak voicing;General debilitation;HX neurologic dx;Positionong Issues; Neck hyperextension   Current Symptoms/Concerns Cough;Clear throat; With food; With liquids;During meals; Moist cough; Recent Pneumonia; Decreased oral intake;Weight loss;HX Dysphagia   Current Diet Dysphagia pureed; Nectar thick liquid   Baseline Diet Dyphagia mechanical soft; Nectar thick liquids   Consistencies Assessed and Performance   Oral Stage Moderate impaired   Phargngeal Stage Moderate impaired;Aspiration risk   Esophageal Concerns Hx GERDS;Early satiety   Recommendations   Risk for Aspiration Severe   Recommendations Consider Video/Barium Swallow Study; Dysphagia treatment;Esophagram/Barium swallow   Diet Solid Recommendation Level 1 Dysphagia/pureed   Diet Liquid Recommendation Nectar thick liquid   General Precautions Aspiration precautions; Feed only when alert;Minimize distractions;Upright as possible for all oral intake;Remain upright for 45 mins after meals   Compensatory Swallowing Strategies Alternate solids and liquids; No straws; Chin tuck   Further Evaluations Gastroenterology;ENT   Results Reviewed with PT/Family/Caregiver;PAC/CRNP   Eating   Type of Assistance Needed Set-up / clean-up   Physical Assistance Level No physical assistance   Eating CARE Score 5   Swallow Assessment   Swallow Treatment Assessment Daily Dysphagia Tx Note      Patient Name: Gregorio QUEZADA Date: 7/14/2023        Current Risks for Dysphagia & Aspiration: Weak voicing; weak cough; dysarthria; general debilitation; new neuro event; brain injury; cognitive deficit; hx neurologic dx; positioning issues     Current Symptoms/Concerns: difficulty chewing; decreased oral intake; hx of dysphagia/aspiration; throat clearing; cough with intake     Current diet:puree/level 1 diet and nectar thick liquids      Premorbid diet::puree/level 1 diet and nectar thick liquids      Positioning: upright in wheelchair     Items administered:Consistencies Administered: thin liquids, puree   Materials administered included puree scrambled eggs, pureed carrots, pureed chicken, mashed potatoes, pudding, and NTL by cup     Total amount of meal consumed:   50%  60cc by cup sips        Oral stage:moderate  Lip closure: completed  Anterior spillage: minimal amounts with liquids  Mastication:  mild with puree  Bolus formation: decreased   Bolus control: reduced  Transfer: slow and delayed with foods, more timely with liquids  Oral residue: none  Pocketing: none        Pharyngeal stage:moderate  Swallow promptness: delayed  Hyolaryngeal elevation: moderately decreased   Wet voice: min   Throat clear: present with thin liquids x5  Cough: weak x1 with NTL   Secondary swallows: piecemeal transfers   Audible swallows: present with liquids and purees; increased with tactile pressure during assessment         Esophageal stage:  Patient reports nausea, premature fullness, and hoarse/harsh vocal quality. Pt reports continued GI concerns with reflux which occurred prior to admission as well. Pt follows GI through [facility]. SLP noted concern to NP who arrived near end of session. Summary:     Pt presenting with moderate oral and pharyngeal dysphagia this session.  Symptoms or concerns included trace anterior loss of thins, slow manipulation of puree,  reduced bolus formation, suspected reduced bolus control, slow and at times labored transfers, pharyngeal swallow delay,  reduced hyolaryngeal elevation, secondary swallows, audible swallows, presence of throat clearing and coughing across meal.  Patient with neck flexion further complicating swallow and increasing aspiration risks. Pt upright in wheelchair and set up with meal tray and able to self feed independently. Daughter present for session and high level education provided. Patient did drop NTL cup noting "that happens when I use my weak hand". Pt assessed with baseline puree textures and NTL. Pt with good retrieval and control, open mouth posture with chewing, prolonged and anterior chewing patterns, decreased bolus breakdown. DTR reported nose fracture that was not addressed as surgery risks are high for patient at this time. SLP observed during review of VBS poor  closure that allowed for nasal regurgitation x1. DTR and patient did report significant weight loss. SLP introduced exercises to address  closure and recommend ENT follow-up to address hoarse and harsh vocal quality and potential need for further assessment of nasal fracture PRN as it results in mouth breathing and SOB. Decreased intelligibility with expressive communication. SLp observed neutral position of articulators. Short oral motor assessment with all oral motor skills grossly WFL. Patient c with audible swallows throughout PO intake. Puree textures timely with bolus formations, transfers and swallows. No retention/pocketing observed at end of meal.  Patient reports food gets stuck mid throat level and base of neck level of UES. SLP introduced shaker exercises. Print out for puree and level 2 diet provided. Given link for videos on how to prepare puree and minced/ moist textures. SLP recommended gel thickener and provided link. DTR noted previous assessment of esophagus noted "crooked" esophagus. Patient notes dislike for pureed textures. Recommended neutral position/ chin down for PO intake and to reduce neck flexion to increase safety with PO intake.   Introduced swallowing exercise program.             Recommendations:  Diet: puree/level 1 diet and nectar thick liquids  Meds: whole or cut with puree  Strategies: upright posture, only feed when fully alert, slow rate of feeding, small bites/sips, effortful swallow, quiet environment (tv off, limit talking, door closed, etc.) and alternating bites and sips     FULL supervision w/ meals. Results reviewed with: patient, DTR, NP    Aspiration precautions posted. F/u ST tx: Pt is currently recommended for further skilled SLP services targeting dysphagia therapy in order to maximize oral and pharyngeal swallow skills, while safely supporting PO intake, as well as to improve independent carryover of safe swallow strategies. Plan: Cont to monitor pt on current diet              Trial upgraded textures of dysphagia level 2 and thin liquids as appropriate              May benefit from f/u with GI team which pt follows with [facility] given esophageal concerns. Recommend ENT consult. Cont education on LRD recommendations    Swallow Assessment Prognosis   Prognosis Fair   Prognosis Considerations Age; Co-morbidities; Medical prognosis; Potential;Previous level of function;New learning ability;Ability to carry over   SLP Therapy Minutes   SLP Time In 1200   SLP Time Out 1330   SLP Total Time (minutes) 90   SLP Mode of treatment - Individual (minutes) 90   SLP Mode of treatment - Concurrent (minutes) 0   SLP Mode of treatment - Group (minutes) 0   SLP Mode of treatment - Co-treat (minutes) 0   SLP Mode of Treatment - Total time(minutes) 90 minutes   SLP Cumulative Minutes 300   Therapy Time missed   Time missed?  No

## 2023-07-14 NOTE — PROGRESS NOTES
OT Daily Treatment Note       07/14/23 1000   Pain Assessment   Pain Assessment Tool 0-10   Pain Score No Pain   Restrictions/Precautions   Precautions Aspiration;Bed/chair alarms;Cognitive; Fall Risk;Pain;Supervision on toilet/commode   Lifestyle   Autonomy "I just don't know what the future holds and it makes me really sad"   Upper Body Dressing   Type of Assistance Needed Physical assistance   Physical Assistance Level 51%-75%   Comment mod A with max vc to attend to L hand when donning shirt as pt was observed grabbing at nothing. A for threading LUE and down back   Upper Body Dressing CARE Score 2   Lower Body Dressing   Type of Assistance Needed Physical assistance   Physical Assistance Level 26%-50%   Comment min A to thread LUE through pant leg. able to don over hips in stance with CG   Lower Body Dressing CARE Score 3   Sit to Stand   Type of Assistance Needed Incidental touching   Physical Assistance Level No physical assistance   Comment RW   Sit to Stand CARE Score 4   Bed-Chair Transfer   Type of Assistance Needed Incidental touching   Physical Assistance Level No physical assistance   Comment SPT with RW   Chair/Bed-to-Chair Transfer CARE Score 4   Coordination   Fine Motor session grossly focused on large peg board activity focusing on increasing pts LUE fine motor coordination, in-hand manipulation, L visual attention, sustained attention with activity and LUE ROM. pt initially just completed task of placing pegs into board requiring mod vc and min A to grab larger top of peg as pt was observed attempting to grab for peg but no peg was present in her hand. Activity then graded up where pt followed peg board pattern design with color coordination to increase pts overall attention. during this task, pts family arrived to visit, causing pt to lose attention to task. family was supportive in encouraging pt to remain attentive which pt was then able to complete with 100% accuracy.  pt reports wanting to continue focusing on LUE fine and gross motor deficits as she feels that is her biggest barrier at this time. Cognition   Overall Cognitive Status Impaired   Arousal/Participation Alert;Arousable   Attention Attends with cues to redirect   Orientation Level Oriented X4   Memory Decreased short term memory;Decreased recall of recent events;Decreased recall of precautions   Following Commands Follows one step commands with increased time or repetition   Additional Activities   Additional Activities Other (Comment)   Additional Activities Comments at start of session, time spent providing emotional support as pt confided in OT explaining that she is feeling severely depressed with the uncertainty of "what comes next" when she returns home re: her overall health and need for physical assistance with certain activities. OT provided encouragment that the pt has had a lot of health issues over the past few months and has been successful with her rehabilitation and was able to return home after her CVA a few months ago and will go home after this admission as well. OT edu pt that she has a lot of support at home from her children and should continue to rely on them for physical and emotional support as needed. OT also had this conversation with the pts daughter Ellie Mcintyre when she was present towards the end of session. Ellie Hadleykenyetta reported that the pt has a personal psychologist but due to there being a conflict of interest, the pts personal psychologist cannot see the pt at this time. both pt and daughter agreeable for pt to see psychologist while here at [facility]. OT messaged Dr. Demetria Vasques re: this information, requesting followup with neuropsych, if possible. Pt and family appreciative of this.    Activity Tolerance   Activity Tolerance Patient tolerated treatment well   Assessment   Treatment Assessment Pt participated in skilled OT session with focus on ADL retraining, functional transfer training, UE strengthening/ROM, Pt/caregiver education and activity engagement . See flowsheet for details of session and current functional status. Pt is limited by weakness, decreased ROM, impaired balance, decreased endurance, increased fall risk, decreased ADLS, decreased IADLS, pain, decreased activity tolerance, decreased cognition and decreased strength and requires skilled OT services to increase independence and safety with ADL completion in prep for DC home. Plan to continue ADL retraining, functional transfer training, UE strengthening/ROM, endurance training, compensatory technique education, continued education, energy conservation and activity engagement  to address barriers mentioned above. Prognosis Fair   Problem List Decreased strength;Decreased endurance; Impaired balance;Decreased mobility;Pain;Decreased skin integrity   Barriers to Discharge Inaccessible home environment;Decreased caregiver support   Plan   Treatment/Interventions ADL retraining;Functional transfer training; Therapeutic exercise; Endurance training;Patient/family training; Compensatory technique education   Progress Progressing toward goals   OT Therapy Minutes   OT Time In 1000   OT Time Out 1130   OT Total Time (minutes) 90   OT Mode of treatment - Individual (minutes) 90   OT Mode of treatment - Concurrent (minutes) 0   OT Mode of treatment - Group (minutes) 0   OT Mode of treatment - Co-treat (minutes) 0   OT Mode of Treatment - Total time(minutes) 90 minutes   OT Cumulative Minutes 578   Therapy Time missed   Time missed?  No

## 2023-07-14 NOTE — PROGRESS NOTES
07/14/23 1430   Pain Assessment   Pain Assessment Tool 0-10   Pain Score 7   Pain Location/Orientation Orientation: Left; Location: Hip;Location: Back   Restrictions/Precautions   Precautions Aspiration;Bed/chair alarms;Cognitive; Fall Risk;Contact/isolation;Pain;Supervision on toilet/commode;Visual deficit   Weight Bearing Restrictions No   ROM Restrictions No   Cognition   Overall Cognitive Status Impaired   Arousal/Participation Alert;Arousable   Attention Attends with cues to redirect   Orientation Level Oriented X4   Memory Decreased short term memory;Decreased recall of recent events;Decreased recall of precautions   Following Commands Follows one step commands with increased time or repetition   Subjective   Subjective Patient reports she was in a lot of pain ealier.  States she was given medication   Sit to Lying   Type of Assistance Needed Physical assistance   Physical Assistance Level 25% or less   Comment Fei for LLE management   Sit to Lying CARE Score 3   Lying to Sitting on Side of Bed   Type of Assistance Needed Incidental touching   Comment CGA with HOB slightly elevated   Lying to Sitting on Side of Bed CARE Score 4   Sit to Stand   Type of Assistance Needed Incidental touching   Comment RW   Sit to Stand CARE Score 4   Bed-Chair Transfer   Type of Assistance Needed Incidental touching   Comment RW SPT EOB>WC   Chair/Bed-to-Chair Transfer CARE Score 4   Walk 10 Feet   Type of Assistance Needed Incidental touching   Comment CS/CGA with RW   Walk 10 Feet CARE Score 4   Walk 50 Feet with Two Turns   Type of Assistance Needed Incidental touching   Comment CGA with RW   Walk 50 Feet with Two Turns CARE Score 4   Walk 150 Feet   Comment unable to complete secondary to high levels of pain and fatigue   Reason if not Attempted Safety concerns   Walk 150 Feet CARE Score 88   Ambulation   Primary Mode of Locomotion Prior to Admission Walk   Distance Walked (feet) 130 ft  (2x10 ft to access [place] [phone] ft)   Assist Device Roller Walker   Gait Pattern Slow Amy; Inconsistant Amy;Narrow AYLA;Step to; Step through; Improper weight shift; Forward Flexion   Limitations Noted In Balance;Device Management; Endurance; Heel Strike;Posture; Safety;Speed;Strength;Swing   Walk Assist Level Contact Guard   Findings VCs to stay in walker frame, assistance for L hand placement on walker at times   Does the patient walk? 2. Yes   Wheel 50 Feet with Two Turns   Type of Assistance Needed Physical assistance   Physical Assistance Level 25% or less   Wheel 50 Feet with Two Turns CARE Score 3   Wheel 150 Feet   Type of Assistance Needed Physical assistance   Physical Assistance Level Total assistance   Wheel 150 Feet CARE Score 1   Wheelchair mobility   Type of Wheelchair Used 1. Manual   Method Right upper extremity;Right lower extremity; Left lower extremity   Assistance Provided For Obstacles; Locking Brakes   Toilet Transfer   Type of Assistance Needed Incidental touching   Comment BSC over toilet with RW, CGA for safety   Toilet Transfer CARE Score 4   Therapeutic Interventions   Strengthening Seated Knee extension 1# 3x12 each LE, Seated hamstring curl YTB 3x15, Seated hip abduction with YTB 3x15, seated hip adduction withball 3x15. VC for slow controlled motion. Assessment   Treatment Assessment Patient participated in 48 minute skilled physical therapy session focusing on LE Strengthening and functional mobility. Patient continues to fatigue quickly with ambulation and WC navigation. Next try longer distance ambulation at beginning of session with goal of getting over 150 ft. At end of session patient reporting tail bone pain, so patient was left in R sidelying in bed for pressure relief. PT continue to work on LE strengthening, longer distance gait training, and balance training. Problem List Decreased strength;Decreased endurance; Impaired balance;Decreased mobility;Pain;Decreased skin integrity   Barriers to Discharge Inaccessible home environment;Decreased caregiver support   PT Barriers   Functional Limitation Car transfers;Stair negotiation;Standing;Transfers; Walking; Wheelchair management   Plan   Treatment/Interventions Functional transfer training;LE strengthening/ROM; Therapeutic exercise; Endurance training;Cognitive reorientation;Patient/family training;Equipment eval/education; Bed mobility;Gait training   Progress Progressing toward goals   Recommendation   PT Discharge Recommendation Home with home health rehabilitation   Equipment Recommended Walker   PT Therapy Minutes   PT Time In 1430   PT Time Out 1523   PT Total Time (minutes) 53   PT Mode of treatment - Individual (minutes) 53   PT Mode of treatment - Concurrent (minutes) 0   PT Mode of treatment - Group (minutes) 0   PT Mode of treatment - Co-treat (minutes) 0   PT Mode of Treatment - Total time(minutes) 53 minutes   PT Cumulative Minutes 710 Pema DELEON, PT, DPT

## 2023-07-14 NOTE — PROGRESS NOTES
Pastoral Care Progress Note    2023  Patient: Uriel Hooper : 1955  Admission Date & Time: 2023 1157  MRN: [id number] CSN: [id number]        Made /spiritual care intro visit with PT, who was very gracious and full of thanks. Will continue to check on her when I round on AR[facility].

## 2023-07-14 NOTE — PROGRESS NOTES
Internal Medicine Progress Note  Patient: Marilyn Mann  Age/sex: 70 y.o. female  Medical Record #: [id number]      ASSESSMENT/PLAN: (Interval History)  Marilyn Mann is seen and examined and management for following issues:    Left lateral thigh hematoma   • Stable  • Will watch hemoglobin     ABLA  • S/p 1 unit PRBCs 7/2/23     Thrush  • Continue Nystatin     Chronic combined systolic/diastolic heart failure   • Originally diagnosed 2004 when LVEF then was 10-15% and felt to be NICM  • Her LVEF did recover into 50's then fell when she had her CVA in 2/2023 to 30%  • Also has moderate AI/gr 2 DD  • Sees Dr Maile Alberto a [facility] in 2/2023 when LVEF was found to be 30% which is most current  • Had been on Coreg in past but HF team switched her to Toprol when she had her stroke 2/2023  • Her soft BPs have limited starting her on ACEI/ARB as OP  • Home:  Toprol XL 25mg BID/Aldactone 25mg qd  • Here:  Toprol XL 25 mg BID/Aldactone 25mg qd  • Minipress was stopped on 7/8 since hadn't been getting 2/2 low BPs  • Has some mild lower leg edema = will ACE wrap.   On no diuretic  • Toprol held last  2 evenings 7/12/23 and 7/13/23 and held again this AM 7/14 for SBP 92  • Aldactone held this AM 7/14 for SBP 92  • May need Midodrine if meds are continuing to be held.     Hypercalcemia  • Continue Cinacalcet here as was taking at home     HS hypoxia   • using 4L NC HS at home  • Not using O2 at night here     Nightmares  • placed on Minipress when here with us after her CVA  • D/w Dr Snow Sensing, d/c'd since had been too low to get     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  • Has residual left sided weakness  • Had recrudescence of CVA 4/2023 with worsening left facial droop  • Continue Eliquis     Dysphagia  • Had admission for aspiration PNA at [facility] 5/2023 tx'd with Levaquin and Flagyl  • Continue pureed diet with NTL as at home  • Continue Ensure HP pudding TID  • ST following     Bipolar depression  • Home:  Wellbutrin 100mg BID/Cymbalta 60mg BID/Lamictal 150mg qhs  • Here:  Wellbutrin 100mg BID/Cymbalta 60mg BID/Lamictal 150mg qhs     Rheumatoid arthritis  • Continue Plaquenil 200mg BID as at home     Chronic pain/chronic opioid use  • 2/2 RA and lumbar spondylosis  • Pain management per PMR     Hx incidental right superior hypophyseal artery aneurysm  • Follows with NS Dr Myers Petros     CRE urine 3/2023  • Maintain contact isolation     Dye allergy   • Was dye prepped on 7/4/23 with Benadryl 50mg IV x 1 w/o issues     Incidental findings  • Pancolitis with increasing inflammatory free fluid in pelvis  • Calcified granuloma RLL     Hypokalemia  • Got Kdur 40 meq x 2 on 7/8/23  • Stable at 4.1     Enterococcus UTI  • S/p Laural Lusher Dr Depadua = sensitive        Discharge date:  7/21/23       The above assessment and plan was reviewed and updated as determined by my evaluation of the patient on 7/14/2023.     Labs:   Results from last 7 days   Lab Units 07/13/23  0648 07/10/23  0501   WBC Thousand/uL 5.95 7.64   HEMOGLOBIN g/dL 10.1* 9.3*   HEMATOCRIT % 32.3* 29.7*   PLATELETS Thousands/uL 295 330     Results from last 7 days   Lab Units 07/13/23  0648 07/10/23  0501   SODIUM mmol/L 140 143   POTASSIUM mmol/L 4.1 4.1   CHLORIDE mmol/L 112* 112*   CO2 mmol/L 26 26   BUN mg/dL 17 18   CREATININE mg/dL 0.83 0.91   CALCIUM mg/dL 8.4 8.1*                   Review of Scheduled Meds:  Current Facility-Administered Medications   Medication Dose Route Frequency Provider Last Rate   • acetaminophen  650 mg Oral Q6H Murali Rodriguez MD     • albuterol  2 puff Inhalation Q6H PRN Awa Chávez MD     • apixaban  5 mg Oral BID Awa Chávez MD     • buPROPion  100 mg Oral BID Awa Chávez MD     • cinacalcet  30 mg Oral Daily With Breakfast Awa Chávez MD     • Diclofenac Sodium  2 g Topical 4x Daily PRN Awa Chávez MD     • DULoxetine  60 mg Oral BID Awa Chávez MD     • gabapentin  400 mg Oral BID Eve Lanier MD     • hydroxychloroquine  200 mg Oral BID With Meals Eve Lanier MD     • lamoTRIgine  150 mg Oral HS Eve Lanier MD     • lidocaine  1 patch Topical Daily LAURA Rutledge     • magnesium Oxide  400 mg Oral BID Eve Lanier MD     • melatonin  3 mg Oral HS Eve Lanier MD     • metoprolol succinate  25 mg Oral BID LAURA Santiago     • nystatin  500,000 Units Swish & Spit 4x Daily LAURA Rutledge     • ondansetron  4 mg Oral Q6H PRN Eve Lanier MD     • oxyCODONE  5 mg Oral Q4H PRN Eve Lanier MD     • oxyCODONE  2.5 mg Oral Q4H PRN Eve Lanier MD     • pantoprazole  40 mg Oral Early Morning Eve Lanier MD     • simethicone  80 mg Oral 4x Daily (with meals and at bedtime) LAURA Santiago     • spironolactone  25 mg Oral Daily Eve Lanier MD         Subjective/ HPI: Patient seen and examined. Patients overnight issues or events were reviewed with nursing or staff during rounds or morning huddle session. New or overnight issues include the following:     No new or overnight issues. Offers no complaints    ROS:   A 10 point ROS was performed; negative except as noted above.        Imaging:     No orders to display       *Labs /Radiology studies reviewed  *Medications reviewed and reconciled as needed  *Please refer to order section for additional ordered labs studies  *Case discussed with primary attending during morning huddle case rounds    Physical Examination:  Vitals:   Vitals:    07/13/23 2223 07/14/23 0600 07/14/23 0627 07/14/23 0947   BP: 100/70  99/60 92/52   BP Location:   Right arm    Pulse:   84 88   Resp:   17    Temp:   (!) 97.4 °F (36.3 °C)    TempSrc:   Axillary    SpO2:   94%    Weight:  53.7 kg (118 lb 4.8 oz)     Height:           General Appearance: no distress, conversive  HEENT: PERRLA, conjuctiva normal; oropharynx clear; mucous membranes moist   Neck:  Supple, normal ROM  Lungs: CTA, normal respiratory effort, no retractions, expiratory effort normal  CV: regular rate and rhythm; no rubs/murmurs/gallops, PMI normal   ABD: soft; ND/NT; +BS  EXT: no edema  Skin: normal turgor, normal texture, no rashes  Psych: affect normal, mood normal  Neuro: AAO      The above physical exam was reviewed and updated as determined by my evaluation of the patient on 7/14/2023. Invasive Devices     Peripheral Intravenous Line  Duration           Long-Dwell Peripheral IV (Midline) 49/34/45 Right Basilic 10 days                   VTE Pharmacologic Prophylaxis: Eliquis  Code Status: Level 1 - Full Code  Current Length of Stay: 7 day(s)      Total time spent:  30 minutes with more than 50% spent counseling/coordinating care. Counseling includes discussion with patient re: progress  and discussion with patient of his/her current medical state/information. Coordination of patient's care was performed in conjunction with primary service. Time invested included review of patient's labs, vitals, and management of their comorbidities with continued monitoring. In addition, this patient was discussed with medical team including physician and advanced extenders. The care of the patient was extensively discussed and appropriate treatment plan was formulated unique for this patient. Medical decision making for the day was made by supervising physician unless otherwise noted in their attestation statement. ** Please Note:  voice to text software may have been used in the creation of this document.  Although proof errors in transcription or interpretation are a potential of such software**

## 2023-07-14 NOTE — PLAN OF CARE
Problem: INFECTION - ADULT  Goal: Absence or prevention of progression during hospitalization  Description: INTERVENTIONS:  - Assess and monitor for signs and symptoms of infection  - Monitor lab/diagnostic results  - Monitor all insertion sites, i.e. indwelling lines, tubes, and drains  - Monitor endotracheal if appropriate and nasal secretions for changes in amount and color  - [place] appropriate cooling/warming therapies per order  - Administer medications as ordered  - Instruct and encourage patient and family to use good hand hygiene technique  - Identify and instruct in appropriate isolation precautions for identified infection/condition  Outcome: Progressing

## 2023-07-14 NOTE — PROGRESS NOTES
PM&R PROGRESS NOTE:  Sravani Castaneda 70 y.o. female MRN: [id number]  Unit/Bed#: -01 Encounter: [id number]    Rehabilitation Diagnosis: Impairment of mobility, safety, Activities of Daily Living (ADLs), and cognitive/communication skills due to Other Disabling Impairments:  13  Other Disabling Impairments    HPI: Sravani Castaneda is a 70 y.o. female with medical history of HTN, CVA in 2/2023 on eliquis (chronic dysphagia, L sided weakness, cognitive impairment), hypercalcemia,  CHF, GERD, SHAR, gout, bipolar disorder, RA on plaquenil, chronic opioid dependence on morphine for chronic back/joint pain, who presented to the [address] [place] [address] [address] on 6/30 after a fall, unable to get up and disoriented. She thinks she tripped and fell down her porch steps, landing on he L thigh. Found to have a L lateral thigh hematoma with evidence of extravasation on imaging. Binder placed over hips, and eliquis was held but not reversed given her recent stroke. Speech consulted and noted oropharyngeal swallow likely at baseline and recommended Level 1/NTL. Recommended for VBS. Course c/b ABLA on 7/2 Hgb dropped to 6.8 and she was given 1 unit pRBC. Geriatrics was consulted, noted at baseline she is oriented but forgetful, with worsening issues since her stroke. Mini-Cog was 9/15 in 2021. They recommended checking B12. They noted she has not been on lorazepam since January, and also recommended weaning down off gabapentin if possible. On 7/4 patient had an unwitnessed fall while trying to reach for her bag on the window sill. No head strike, but landed on her L hip. GCS 15, Neuro exam was baseline. CTA ordered to look for active bleeding, which showed decreased size of hematoma with no active extravasation. Hgb did drop after her transfusion, but seems to have stablized today at 8.5.  That being said, her HR was elevated today, and it appears a troponin has been ordered by the trauma team as well as a CXR. CXR showed hazy RLL opacity, likely 2/2 atelectasis, and trops trended down. Metoprolol was increased with improvement. She was admitted to the [facility] on 7/7. SUBJECTIVE: Patient seen face to face. Family at bedside. No acute issues overnight. Did have a nightmare overnight, but was able to fall back to sleep. Appetite at baseline, voiding, LBM 7/13. Denies chest pain, shortness of breath, fever, chills, N/V, abdominal pain. Update provided to daughter, William Diaz. All questions answered. Will continue to provide updates throughout admission. ASSESSMENT: Stable, progressing    PLAN:  - bilateral lower extremity edema improved, continue to monitor  - UTI: continue Macrobid thru 7/14, monitor for recurrent symptoms  - thrush- tongue pink, continue Nystatin thru 7/17  - continue current pain management regimen  -sundown/nightmare: continue frequent rounding, floor mats, bed/chair alarm audible on loudest setting, sleep log  - anemia: hemoglobin improving, continue to monitor with routine labs  - electrolytes stable, monitor with routine labs  - transitioned to home MSIR 7.5mg every 12 hours prn    Rehabilitation  • Functional deficits:  cognitive impairment, self-care, impaired mobility  • Continue current rehabilitation plan of care to maximize function.    • Functional update:   o PT: mobility- incidental touching, transfers- incidental touching, ambulation- incidental touching '  o OT: ADL: bathing- min A, dressing- UB mod-max A LB min-mod A, footwear- mod-max A, toileting- min A  o SLP: dysphagia Level 1/NTL  • Estimated Discharge: anticipated 7/21 with home PT/OT    Pain  • Tylenol 975 mg every 8 hours  • Lidocaine patch  • Gabapentin 400 mg BID  • Diclofenac Sodium topical gel QID prn  • Oxycodone 2.5-5 mg every 4 hours prn    DVT prophylaxis  • Eliquis    Bladder plan  • Continent    Bowel plan  • Continent      * Leg hematoma, left, initial encounter  Assessment & Plan  2/2 fall    6/30 CT CAP:  Large hematoma in the lateral left thigh measuring up to 9.6 cm with focus of active contrast extravasation. No acute intrathoracic or intra-abdominal injury. 7/4 CTA Pelvis:  1. Decreasing size of left lateral thigh hematoma without evidence for active extravasation. 2.  No CT findings in the visualized pelvis and thighs to explain falling hematocrit. 3.  Pancolitis with increasing inflammatory free fluid in the pelvis. No free air, pneumatosis, or encapsulated collections demonstrated. Non-operatively managed. Did require transfusion x1  Pain management as below  Monitor Hgb. PT/OT 3-5 hours/day, 5-7 days/week  Outpatient f/u with General Surgery      History of stroke  Assessment & Plan  MRI brain 2/22 - 1.  New small acute /more recent interval lacunar infarction in the right cerebral peduncle (series 4, image 14). 2.  Extensive multifocal large, previously present right middle cerebral artery infarction which is evolving with superimposed areas of evolving hemorrhage likely related to hemorrhagic transformation of subacute right MCA infarction.  No significant   mass effect. 3.  Mild, chronic microangiopathy. S/P thrombectomy 2/12 by Dr Rekha Brian   Secondary stroke prophylaxis at this time with Eliquis 5 mg twice daily   PT, OT, SLP.    Review modifiable risk factors and provide stroke education  Monitor for poststroke pain  Eliquis for secondary stroke prophylaxis    Outpatient f/u with Neurology    CHF (congestive heart failure) ([address])  Assessment & Plan  Wt Readings from Last 3 Encounters:   07/14/23 53.7 kg (118 lb 4.8 oz)   06/30/23 55.3 kg (122 lb)   06/06/23 47.9 kg (105 lb 9.6 oz)     2/2023 Echo 30% with G2DD, moderate AI  Home: Toprol XL 25mg BID, Aldactone 25mg daily   - Limited by BP - no ACE/ARB   - no SGLT2i due to recurrent UTI  Here: Toprol XL 25mg BID, spironolactone 25mg daily   Unclear etiology of cardiomyopathy  Possible consideration for ischemic work-up outpatientcherise candidate for aggressive procedures   Monitor I/O/daily weights, volume status, lytes. IM consulted to assist with management  Outpatient f/u with Dr. Grace Rudd      Dysphagia  Assessment & Plan  2/2 previous CVA earlier this year. Noted to have impaired oropharyngeal swallow  Was on free water protocol at home - holding for now due to thrush  On Level 1/NTL  - Inpatient SLP recommended VBS  - Consulting SLP here  - Aspiration precautions  - Strict oral hygiene prior to trials    Urinary tract infection without hematuria  Assessment & Plan  Present on admission  With incontinence and confusion  Grew 100k CFU Enterococcus    - Started on Macrobid based on antibiogram on 7/9   - sensitivity confirms susceptibility    Mixed stress and urge urinary incontinence  Assessment & Plan  Frankly incontinent here  Initiate timed voids and scans  Has history of recurrent UTIs - will check UA on admission here given extent of incontinence   - Had confusion on 7/8-7/9 overnight. - See UTI for details. Close continence care      Anemia  Assessment & Plan  2/2 L leg hematoma (most recent imaging negative for extravasation)  At home on eliquis  No reversal inpatient due to recent CVA  Prior to admission Hgb 10-12. Ravi 7/2 6.8 - given 1 unit pRBC  7/8 Hgb 8.5 > 9.2 > 9.3 > 10.1  Monitor, transfuse as appropriate. IM consulted to assist with management    Cervical dystonia  Assessment & Plan  Recommend outpatient f/u with Dr. Sonia Canales to evaluate for botulinum toxin injections  Appears to have a retrocollis. Sundowning  Assessment & Plan  Does wake up confused at night, but is easily redirectable  - More confusion, than agitation  - Wandered all the way down to the cafeteria on the 1st floor on 7/9  - No injuries  - Brought back to her room  - UCx with 100k enterococcus. - treat UTI  - Low bed, Q15hr checks overnight, Sleep logs  - Floor mats.    - Delirium precautions    Night terrors  Assessment & Plan  Home: Prazosin  Here: Prazosin  Continue melatonin  Outpatient f/u with PCP    Thrush  Assessment & Plan  Noted on admission  7/7 started on nystatin swish/spit  - 7/11 improving  - Continue for at least 7 days with 2-3 days after her antibiotics stop    Hypomagnesemia  Assessment & Plan  Home: Mag oxide 800mg BID  Here: Mag oxide 400mg BID  7/9 Mag 1.9   Goals > 2. Sinus tachycardia  Assessment & Plan  Home: Toprol XL 25mg BID  Here: Same  On 7/6 with HR in 115-120. EKG showed sinus tach. Trops trended and negative. - Associated with transient/mild SOB   - She was only being given Toprol daily in the hospital   - Increased to BID with improvement  Lytes with K > 4, Mag > 2  Monitor and adjust regimen as appropriate  IM consulted to assist with management  Outpatient f/u with Dr. Mona Bhandari    Obstructive sleep apnea  Assessment & Plan  Previously non-compliant with hospital or home CPAP  Outpatient f/u with PCP. Hypercalcemia  Assessment & Plan  Suspect 2/2 primary hyperparathyroidism  7/8 iCal 1.12, Phos 3.3  Home: Sensipar 30mg daily  Here: Same  Recommend outpatient f/u with Endocrinology      HTN (hypertension)  Assessment & Plan  Tends to run on the low/normal side. Home: Toprol XL 25mg BID, Spironolactone 25mg daily  Here: Same  Has not been able to get meds at times due to not meeting parameters. Monitor and adjust as appropriate  IM consulted to assist with management    Bipolar depression [facility])  Assessment & Plan  Home: Bupropion 100mg BID, Cymbalta 60mg BID, Ativan 0.25mg BID PRN (however this has not been filled since January 2023 as per PDMP)  Here: Bupropion 100mg BID, Cymbalta 60mg BID  Consulting rehab psychology for support  Adjust as appropriate  Outpatient f/u with PCP    Severe protein-calorie malnutrition ([address])  Assessment & Plan  Malnutrition Findings:                                 BMI Findings: Body mass index is 20.89 kg/m².      Consulted nutrition    Osteoporosis  Assessment & Plan  Hx L1 compression fx s/p kyphoplasty  Was previously on Prolia - last given in 2020   - Held due to dental issues  Recommend outpatient f/u with Endocrinology  2/23 Vitamin D level is 41.7    Gastroesophageal reflux disease without esophagitis  Assessment & Plan  Home: PPI daily  Here: PPI daily    Seronegative arthropathy of multiple sites [facility])  Assessment & Plan  Follows with Dr. Genoveva Ervin with Rheum and is on Hydroxychloroquine 200mg BID. Continue home regimen and follow-up with rheumatology on discharge    Mild intermittent asthma without complication  Assessment & Plan  No acute exacerbation  Home: Albuterol PRN  Here: Same    Chronic pain syndrome  Assessment & Plan  Joint and back pain  Follows with Dr. Gil Blanca with [facility]  Reviewed PDMP - at home on MSIR 7.5mg BID PRN. Here: MSIR 7.5mg BID PRN  - Outpatient f/u with Dr. Gil Blanca. Appreciate IM consultants medical co-management. Personally reviewed labs, medications, and imaging. ROS:  Review of Systems   A 10 point review of systems was negative except for what is noted in the HPI. OBJECTIVE:   BP 92/52   Pulse 88   Temp (!) 97.4 °F (36.3 °C) (Axillary)   Resp 17   Ht 5' 3" (1.6 m)   Wt 53.7 kg (118 lb 4.8 oz)   SpO2 94%   BMI 20.96 kg/m²     Physical Exam  Constitutional:       Appearance: Normal appearance. HENT:      Head: Normocephalic and atraumatic. Nose: Nose normal.      Mouth/Throat:      Mouth: Mucous membranes are moist.   Cardiovascular:      Rate and Rhythm: Normal rate and regular rhythm. Pulses: Normal pulses. Pulmonary:      Effort: Pulmonary effort is normal.      Breath sounds: Normal breath sounds. Abdominal:      General: Bowel sounds are normal.      Palpations: Abdomen is soft. Musculoskeletal:         General: Tenderness present. Normal range of motion. Cervical back: Normal range of motion. Comments: kyphotic   Skin:     General: Skin is warm and dry.       Capillary Refill: Capillary refill takes less than 2 seconds. Findings: Bruising present. Neurological:      Mental Status: She is alert and oriented to person, place, and time. Motor: Weakness: generalized.    Psychiatric:         Mood and Affect: Mood normal.         Judgment: Judgment normal.        Lab Results   Component Value Date    WBC 5.95 07/13/2023    HGB 10.1 (L) 07/13/2023    HCT 32.3 (L) 07/13/2023     (H) 07/13/2023     07/13/2023     Lab Results   Component Value Date    SODIUM 140 07/13/2023    K 4.1 07/13/2023     (H) 07/13/2023    CO2 26 07/13/2023    BUN 17 07/13/2023    CREATININE 0.83 07/13/2023    GLUC 93 07/13/2023    CALCIUM 8.4 07/13/2023     Lab Results   Component Value Date    INR 1.33 (H) 07/01/2023    INR 1.65 (H) 06/30/2023    INR 1.66 (H) 04/29/2023    PROTIME 16.7 (H) 07/01/2023    PROTIME 19.5 (H) 06/30/2023    PROTIME 19.9 (H) 04/29/2023           Current Facility-Administered Medications:   •  acetaminophen (TYLENOL) tablet 650 mg, 650 mg, Oral, Q6H [facility], Berny Juarez MD, 650 mg at 07/14/23 0758  •  albuterol (PROVENTIL HFA,VENTOLIN HFA) inhaler 2 puff, 2 puff, Inhalation, Q6H PRN, Berny Juarez MD  •  apixaban (ELIQUIS) tablet 5 mg, 5 mg, Oral, BID, Berny Juarez MD, 5 mg at 07/14/23 3759  •  buPROPion [facility]) tablet 100 mg, 100 mg, Oral, BID, Berny Juarez MD, 100 mg at 07/14/23 2681  •  cinacalcet (SENSIPAR) tablet 30 mg, 30 mg, Oral, Daily With Breakfast, Berny Juarez MD, 30 mg at 07/14/23 6220  •  Diclofenac Sodium (VOLTAREN) 1 % topical gel 2 g, 2 g, Topical, 4x Daily PRN, Berny Juarez MD  •  DULoxetine (CYMBALTA) delayed release capsule 60 mg, 60 mg, Oral, BID, Berny Juarez MD, 60 mg at 07/14/23 0941  •  gabapentin (NEURONTIN) capsule 400 mg, 400 mg, Oral, BID, Berny Juarez MD, 400 mg at 07/14/23 3491  •  hydroxychloroquine (PLAQUENIL) tablet 200 mg, 200 mg, Oral, BID With Meals, Berny Juarez MD, 200 mg at 07/14/23 0977  • lamoTRIgine (LaMICtal) tablet 150 mg, 150 mg, Oral, HS, Winnie Jalloh MD, 150 mg at 07/13/23 2229  •  lidocaine (LIDODERM) 5 % patch 1 patch, 1 patch, Topical, Daily, LAURA Rutledge, 1 patch at 07/14/23 0941  •  magnesium Oxide (MAG-OX) tablet 400 mg, 400 mg, Oral, BID, Winnie Jalloh MD, 400 mg at 07/14/23 2877  •  melatonin tablet 3 mg, 3 mg, Oral, HS, Winnie Jalloh MD, 3 mg at 07/13/23 2229  •  metoprolol succinate (TOPROL-XL) 24 hr tablet 25 mg, 25 mg, Oral, BID, LAURA Paz, 25 mg at 07/13/23 0813  •  nystatin (MYCOSTATIN) oral suspension 500,000 Units, 500,000 Units, Swish & Spit, 4x Daily, LAURA Rutledge, 500,000 Units at 07/14/23 0941  •  ondansetron (ZOFRAN-ODT) dispersible tablet 4 mg, 4 mg, Oral, Q6H PRN, Winnie Jalloh MD, 4 mg at 07/09/23 1431  •  oxyCODONE (ROXICODONE) IR tablet 5 mg, 5 mg, Oral, Q4H PRN, Winnie Jalloh MD, 5 mg at 07/13/23 2229  •  oxyCODONE (ROXICODONE) split tablet 2.5 mg, 2.5 mg, Oral, Q4H PRN, Winnie Jalloh MD, 2.5 mg at 07/10/23 1438  •  pantoprazole (PROTONIX) EC tablet 40 mg, 40 mg, Oral, Early Morning, Winnie Jalloh MD, 40 mg at 07/14/23 2321  •  simethicone (MYLICON) chewable tablet 80 mg, 80 mg, Oral, 4x Daily (with meals and at bedtime), LAURA Mixon, 80 mg at 07/14/23 0941  •  spironolactone (ALDACTONE) tablet 25 mg, 25 mg, Oral, Daily, Winnie Jalloh MD, 25 mg at 07/13/23 0815    Past Medical History:   Diagnosis Date   • Anxiety    • Asthma    • Bipolar depression ([address])    • Chronic narcotic dependence (HCC)    • Chronic pain    • Depression    • Enterovirus heart infection    • SHAR (obstructive sleep apnea)    • Osteoarthritis    • Peripheral neuropathy    • Plantar fasciitis of right foot    • Senile osteoporosis    • Seronegative arthropathy of multiple sites [facility])    • Undifferentiated connective tissue disease [facility])        Patient Active Problem List    Diagnosis Date Noted   • Leg hematoma, left, initial encounter 07/01/2023   • History of stroke 02/28/2023   • CHF (congestive heart failure) ([address])    • Dysphagia 03/09/2023   • Urinary tract infection without hematuria 03/06/2023   • Mixed stress and urge urinary incontinence    • Anemia 06/26/2021   • Cervical dystonia 07/11/2023   • Sundowning 07/09/2023   • Thrush 07/07/2023   • Night terrors 07/07/2023   • Cerebral aneurysm, nonruptured 05/15/2023   • Nocturnal hypoxia 05/01/2023   • Stroke-like symptoms 05/01/2023   • Insomnia 04/03/2023   • Hypomagnesemia 03/30/2023   • Sinus tachycardia 03/29/2023   • Hypokalemia 03/10/2023   • AMS (altered mental status) 03/09/2023   • Gout of foot 03/09/2023   • Wound of left ankle 03/09/2023   • Obstructive sleep apnea 02/28/2023   • History of bacteremia 02/24/2023   • Acute CVA (cerebrovascular accident) ([address]) 02/23/2023   • Hypercalcemia 02/22/2023   • Discoloration of skin of foot 02/22/2023   • Abdominal pain 02/22/2023   • HFrEF (heart failure with reduced ejection fraction) ([address]) 02/13/2023   • Stroke ([address]) 02/12/2023   • HTN (hypertension) 02/12/2023   • Cardiomyopathy ([address]) 02/07/2023   • Vitamin D insufficiency 02/07/2023   • LAILA (acute kidney injury) ([address]) 04/23/2022   • Polypharmacy 04/23/2022   • 'light-for-dates' infant with signs of fetal malnutrition 04/22/2022   • Bipolar depression ([address]) 03/04/2022   • Rheumatoid arthritis of multiple sites with negative rheumatoid factor ([address]) 03/04/2022   • Closed fracture of multiple ribs of right side 03/02/2022   • Traumatic pneumothorax 03/02/2022   • Closed fracture of transverse process of lumbar vertebra ([address]) 03/02/2022   • Liver contusion 03/02/2022   • Fall 03/02/2022   • Acute pain due to trauma 03/02/2022   • Right shoulder pain 03/02/2022   • Severe protein-calorie malnutrition ([address]) 03/02/2022   • Undifferentiated connective tissue disease ([address]) 12/29/2021   • Primary generalized (osteo)arthritis 12/29/2021   • Peripheral neuropathy 12/29/2021   • Osteoporosis 12/29/2021   • Hx of compression fracture of spine 12/29/2021   • Lumbar spondylosis 12/29/2021   • Cervical spondylosis 12/29/2021   • Chronic pain syndrome    • Mild intermittent asthma without complication    • Ambulatory dysfunction    • Seronegative arthropathy of multiple sites ([address])    • Gastroesophageal reflux disease without esophagitis         Nickola Felty, CRNP  Physical Medicine and [place]

## 2023-07-14 NOTE — PROGRESS NOTES
07/14/23 0830   Pain Assessment   Pain Assessment Tool 0-10   Pain Score No Pain   Restrictions/Precautions   Precautions Aspiration;Bed/chair alarms;Cognitive; Fall Risk;Impulsive;Supervision on toilet/commode;Pain   Weight Bearing Restrictions No   ROM Restrictions No   Cognition   Overall Cognitive Status Impaired   Arousal/Participation Alert;Arousable   Attention Attends with cues to redirect   Orientation Level Oriented X4   Memory Decreased short term memory;Decreased recall of recent events;Decreased recall of precautions   Following Commands Follows one step commands with increased time or repetition   Subjective   Subjective Patient reports she did not sleep well last night   Lying to Sitting on Side of Bed   Type of Assistance Needed Incidental touching   Comment CGA with HOB slightly elevated   Lying to Sitting on Side of Bed CARE Score 4   Sit to Stand   Type of Assistance Needed Incidental touching   Comment RW, VC to push off chair   Sit to Stand CARE Score 4   Bed-Chair Transfer   Type of Assistance Needed Incidental touching   Comment with RW   Chair/Bed-to-Chair Transfer CARE Score 4   Transfer Bed/Chair/Wheelchair   Limitations Noted In Balance; Coordination;Pain Management;Problem Solving; Sequencing;UE Strength;LE Strength;Vision   Walk 10 Feet   Type of Assistance Needed Incidental touching   Comment CS/CGA with RW   Walk 10 Feet CARE Score 4   Walk 50 Feet with Two Turns   Type of Assistance Needed Incidental touching   Comment CGA with RW   Walk 50 Feet with Two Turns CARE Score 4   Walk 150 Feet   Comment Pt reporting UE fatigue and low back pain. unable to ambulat further   Reason if not Attempted Safety concerns   Walk 150 Feet CARE Score 88   Ambulation   Primary Mode of Locomotion Prior to Admission Walk   Distance Walked (feet) 137 ft  (124 ft, 97 ft)   Assist Device Roller Walker   Gait Pattern Slow Amy; Inconsistant Amy;Narrow AYLA;Step to; Step through; Improper weight shift; Forward Flexion   Limitations Noted In Balance;Device Management; Endurance; Heel Strike;Posture; Safety;Speed;Strength;Swing   Provided Assistance with: Direction   Walk Assist Level Contact Guard   Findings VC to stay in walker frame, especially when turning. Assist at time for placement of LUE   Does the patient walk? 2. Yes   Curb or Single Stair   Style negotiated Single stair   Type of Assistance Needed Incidental touching   Comment CGA, b/l railing, step to pattern   1 Step (Curb) CARE Score 4   4 Steps   Type of Assistance Needed Incidental touching   Comment CGA with b/l railings, step to pattern, ascending leading with RLE, descend leading with LLE, x2 trials performed for LE strengthening/overall activity tolerance   4 Steps CARE Score 4   Assessment   Treatment Assessment Patient participated in brief 30 minute skilled physical therapy session focusing on transfers, step navigation and ambulation to improve overall activity tolerance. Patient reports high level of fatigue as she did not sleep well last night. Patient requires CGA with ambulation and stair navigation secondary to demonstrating decreased safety awareness and impaired balance/righting reactions. Patient would benefit from continued PT services to work on single step/curb management with RW, gait training, LE strengthening, and balance training   Problem List Decreased strength;Decreased endurance; Impaired balance;Decreased mobility;Pain;Decreased skin integrity   Barriers to Discharge Inaccessible home environment;Decreased caregiver support   PT Barriers   Functional Limitation Transfers; Wheelchair management; Walking;Stair negotiation;Car transfers   Plan   Treatment/Interventions Functional transfer training;LE strengthening/ROM; Elevations; Therapeutic exercise; Endurance training;Cognitive reorientation;Patient/family training;Equipment eval/education; Bed mobility;Gait training   Progress Progressing toward goals   Recommendation   PT Discharge Recommendation Home with home health rehabilitation   Equipment Recommended Walker   PT Therapy Minutes   PT Time In 0830   PT Time Out 0900   PT Total Time (minutes) 30   PT Mode of treatment - Individual (minutes) 30   PT Mode of treatment - Concurrent (minutes) 0   PT Mode of treatment - Group (minutes) 0   PT Mode of treatment - Co-treat (minutes) 0   PT Mode of Treatment - Total time(minutes) 30 minutes   PT Cumulative Minutes 370   Therapy Time missed   Time missed?  Lidia Arredondo [place], PT, DPT

## 2023-07-15 PROCEDURE — 97535 SELF CARE MNGMENT TRAINING: CPT

## 2023-07-15 PROCEDURE — 99232 SBSQ HOSP IP/OBS MODERATE 35: CPT | Performed by: INTERNAL MEDICINE

## 2023-07-15 RX ORDER — METOPROLOL SUCCINATE 25 MG/1
25 TABLET, EXTENDED RELEASE ORAL DAILY
Status: DISCONTINUED | OUTPATIENT
Start: 2023-07-15 | End: 2023-07-25

## 2023-07-15 RX ORDER — SPIRONOLACTONE 25 MG/1
12.5 TABLET ORAL DAILY
Status: DISCONTINUED | OUTPATIENT
Start: 2023-07-15 | End: 2023-07-16

## 2023-07-15 RX ADMIN — ACETAMINOPHEN 650 MG: 325 TABLET, FILM COATED ORAL at 23:00

## 2023-07-15 RX ADMIN — PANTOPRAZOLE SODIUM 40 MG: 40 TABLET, DELAYED RELEASE ORAL at 06:41

## 2023-07-15 RX ADMIN — ALBUTEROL SULFATE 2 PUFF: 90 AEROSOL, METERED RESPIRATORY (INHALATION) at 16:03

## 2023-07-15 RX ADMIN — ACETAMINOPHEN 650 MG: 325 TABLET, FILM COATED ORAL at 12:29

## 2023-07-15 RX ADMIN — SIMETHICONE 80 MG: 80 TABLET, CHEWABLE ORAL at 08:34

## 2023-07-15 RX ADMIN — SIMETHICONE 80 MG: 80 TABLET, CHEWABLE ORAL at 17:42

## 2023-07-15 RX ADMIN — BUPROPION HYDROCHLORIDE 100 MG: 100 TABLET, FILM COATED ORAL at 17:47

## 2023-07-15 RX ADMIN — NYSTATIN 500000 UNITS: 100000 SUSPENSION ORAL at 08:10

## 2023-07-15 RX ADMIN — LAMOTRIGINE 150 MG: 100 TABLET ORAL at 21:30

## 2023-07-15 RX ADMIN — LIDOCAINE 5% 1 PATCH: 700 PATCH TOPICAL at 08:11

## 2023-07-15 RX ADMIN — MORPHINE SULFATE 7.5 MG: 15 TABLET ORAL at 08:12

## 2023-07-15 RX ADMIN — MAGNESIUM OXIDE TAB 400 MG (241.3 MG ELEMENTAL MG) 400 MG: 400 (241.3 MG) TAB at 17:46

## 2023-07-15 RX ADMIN — HYDROXYCHLOROQUINE SULFATE 200 MG: 200 TABLET ORAL at 17:47

## 2023-07-15 RX ADMIN — MELATONIN TAB 3 MG 3 MG: 3 TAB at 21:32

## 2023-07-15 RX ADMIN — APIXABAN 5 MG: 5 TABLET, FILM COATED ORAL at 08:10

## 2023-07-15 RX ADMIN — DULOXETINE HYDROCHLORIDE 60 MG: 60 CAPSULE, DELAYED RELEASE ORAL at 17:45

## 2023-07-15 RX ADMIN — NYSTATIN 500000 UNITS: 100000 SUSPENSION ORAL at 21:30

## 2023-07-15 RX ADMIN — SIMETHICONE 80 MG: 80 TABLET, CHEWABLE ORAL at 21:30

## 2023-07-15 RX ADMIN — NYSTATIN 500000 UNITS: 100000 SUSPENSION ORAL at 17:45

## 2023-07-15 RX ADMIN — GABAPENTIN 400 MG: 400 CAPSULE ORAL at 08:11

## 2023-07-15 RX ADMIN — HYDROXYCHLOROQUINE SULFATE 200 MG: 200 TABLET ORAL at 08:10

## 2023-07-15 RX ADMIN — DULOXETINE HYDROCHLORIDE 60 MG: 60 CAPSULE, DELAYED RELEASE ORAL at 08:10

## 2023-07-15 RX ADMIN — MAGNESIUM OXIDE TAB 400 MG (241.3 MG ELEMENTAL MG) 400 MG: 400 (241.3 MG) TAB at 08:11

## 2023-07-15 RX ADMIN — ACETAMINOPHEN 650 MG: 325 TABLET, FILM COATED ORAL at 17:45

## 2023-07-15 RX ADMIN — MORPHINE SULFATE 7.5 MG: 15 TABLET ORAL at 23:37

## 2023-07-15 RX ADMIN — NYSTATIN 500000 UNITS: 100000 SUSPENSION ORAL at 12:29

## 2023-07-15 RX ADMIN — SIMETHICONE 80 MG: 80 TABLET, CHEWABLE ORAL at 12:29

## 2023-07-15 RX ADMIN — CINACALCET 30 MG: 30 TABLET, FILM COATED ORAL at 08:11

## 2023-07-15 RX ADMIN — APIXABAN 5 MG: 5 TABLET, FILM COATED ORAL at 17:45

## 2023-07-15 RX ADMIN — BUPROPION HYDROCHLORIDE 100 MG: 100 TABLET, FILM COATED ORAL at 08:10

## 2023-07-15 RX ADMIN — GABAPENTIN 400 MG: 400 CAPSULE ORAL at 17:46

## 2023-07-15 RX ADMIN — ACETAMINOPHEN 650 MG: 325 TABLET, FILM COATED ORAL at 06:41

## 2023-07-15 NOTE — PLAN OF CARE
Problem: INFECTION - ADULT  Goal: Absence or prevention of progression during hospitalization  Description: INTERVENTIONS:  - Assess and monitor for signs and symptoms of infection  - Monitor lab/diagnostic results  - Monitor all insertion sites, i.e. indwelling lines, tubes, and drains  - Monitor endotracheal if appropriate and nasal secretions for changes in amount and color  - [place] appropriate cooling/warming therapies per order  - Administer medications as ordered  - Instruct and encourage patient and family to use good hand hygiene technique  - Identify and instruct in appropriate isolation precautions for identified infection/condition  7/15/2023 1446 by Shanta Ken RN  Outcome: Progressing  7/15/2023 1438 by Shanta Ken, RN  Outcome: Progressing

## 2023-07-15 NOTE — PROGRESS NOTES
Internal Medicine Progress Note  Patient: Franky Burden  Age/sex: 70 y.o. female  Medical Record #: [id number]      ASSESSMENT/PLAN: (Interval History)  Franky Burden is seen and examined and management for following issues:    Left lateral thigh hematoma   • Stable  • Cbc monday     ABLA  • S/p 1 unit PRBCs 7/2/23     Thrush  • Continue Nystatin     Chronic combined systolic/diastolic heart failure   • Originally diagnosed 2004 when LVEF then was 10-15% and felt to be NICM  • Her LVEF did recover into 50's then fell when she had her CVA in 2/2023 to 30%  • Also has moderate AI/gr 2 DD  • Sees Dr Rosa Etienne a LifeVest in 2/2023 when LVEF was found to be 30% which is most current  • Had been on Coreg in past but HF team switched her to Toprol when she had her stroke 2/2023  • Her soft BPs have limited starting her on ACEI/ARB as OP  • Home:  Toprol XL 25mg BID/Aldactone 25mg qd  • Here:  Toprol XL 25 mg BID/Aldactone 25mg qd  • Toprol/aldactone held for low bp  • Will decrease both toprol/aldactone doses  • Held again 7/15=she had issues with hypotension on her last admission as well   • May need Midodrine if meds are continuing to be held.     Hypercalcemia  • Continue Cinacalcet here as was taking at home     HS hypoxia   • using 4L NC HS at home  • Not using O2 at night here     Nightmares  • placed on Minipress when here with us after her CVA  • D/w Dr Gray Perdomo, d/c'd since had been too low to get  • Minipress dc'd 2/2 hypotension     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  • Has residual left sided weakness  • Had recrudescence of CVA 4/2023 with worsening left facial droop  • Continue Eliquis     Dysphagia  • Had admission for aspiration PNA at [facility] 5/2023 tx'd with Levaquin and Flagyl  • Continue pureed diet with NTL as at home  • Continue Ensure HP pudding TID  • ST following     Bipolar depression  • Home:  Wellbutrin 100mg BID/Cymbalta 60mg BID/Lamictal 150mg qhs  • Here:  Wellbutrin 100mg BID/Cymbalta 60mg BID/Lamictal 150mg qhs     Rheumatoid arthritis  • Continue Plaquenil 200mg BID as at home     Chronic pain/chronic opioid use  • 2/2 RA and lumbar spondylosis  • Pain management per PMR     Hx incidental right superior hypophyseal artery aneurysm  • Follows with MAXIMO Purdy     CRE urine 3/2023  • Maintain contact isolation     Dye allergy   • Was dye prepped on 7/4/23 with Benadryl 50mg IV x 1 w/o issues     Incidental findings  • Pancolitis with increasing inflammatory free fluid in pelvis  • Calcified granuloma RLL     Hypokalemia  • Got Kdur 40 meq x 2 on 7/8/23  • Stable at 4.1  • Bmp monday     Enterococcus UTI  • S/p Julieta Ransom Dr Depadua = sensitive        Discharge date:  7/21/23       The above assessment and plan was reviewed and updated as determined by my evaluation of the patient on 7/15/2023.     Labs:   Results from last 7 days   Lab Units 07/13/23  0648 07/10/23  0501   WBC Thousand/uL 5.95 7.64   HEMOGLOBIN g/dL 10.1* 9.3*   HEMATOCRIT % 32.3* 29.7*   PLATELETS Thousands/uL 295 330     Results from last 7 days   Lab Units 07/13/23  0648 07/10/23  0501   SODIUM mmol/L 140 143   POTASSIUM mmol/L 4.1 4.1   CHLORIDE mmol/L 112* 112*   CO2 mmol/L 26 26   BUN mg/dL 17 18   CREATININE mg/dL 0.83 0.91   CALCIUM mg/dL 8.4 8.1*                   Review of Scheduled Meds:  Current Facility-Administered Medications   Medication Dose Route Frequency Provider Last Rate   • acetaminophen  650 mg Oral Q6H [facility] Thao Coles MD     • albuterol  2 puff Inhalation Q6H PRN Thao Coles MD     • apixaban  5 mg Oral BID Thao Coles MD     • buPROPion  100 mg Oral BID Thao Coles MD     • cinacalcet  30 mg Oral Daily With Breakfast Thao Coles MD     • Diclofenac Sodium  2 g Topical 4x Daily PRN Thao Coles MD     • DULoxetine  60 mg Oral BID Thao Coles MD     • gabapentin  400 mg Oral BID Thao Coles MD     • hydroxychloroquine  200 mg Oral BID With Meals Lore Boyce Vasquez Lindsay MD     • lamoTRIgine  150 mg Oral HS Lawanda Lopez MD     • lidocaine  1 patch Topical Daily LAURA Rutledge     • magnesium Oxide  400 mg Oral BID Lawanda Lopez MD     • melatonin  3 mg Oral HS Lawanda Lopez MD     • metoprolol succinate  25 mg Oral Daily LAURA Cunningham     • morphine  7.5 mg Oral Q12H PRN LAURA Miller     • nystatin  500,000 Units Swish & Spit 4x Daily LAURA Rutledge     • ondansetron  4 mg Oral Q6H PRN Lawanda Lopez MD     • pantoprazole  40 mg Oral Early Morning Lawanda Lopez MD     • simethicone  80 mg Oral 4x Daily (with meals and at bedtime) LAURA Kilgore     • spironolactone  12.5 mg Oral Daily LAURA Cunningham         Subjective/ HPI: Patient seen and examined. Patients overnight issues or events were reviewed with nursing or staff during rounds or morning huddle session. New or overnight issues include the following:     Pt seen in bed this am. Recognized me from previous hospitalization. No issues overnight, having some mild pain this am    ROS:   A 10 point ROS was performed; negative except as noted above.        Imaging:     No orders to display       *Labs /Radiology studies reviewed  *Medications reviewed and reconciled as needed  *Please refer to order section for additional ordered labs studies  *Case discussed with primary attending during morning huddle case rounds    Physical Examination:  Vitals:   Vitals:    07/14/23 1430 07/14/23 2122 07/15/23 0515 07/15/23 0817   BP: 98/53 118/60 96/57 98/64   BP Location: Right arm Right arm Right arm    Pulse: 91 85 82 96   Resp: 17 12 19    Temp: 98 °F (36.7 °C) 97.7 °F (36.5 °C) 97.8 °F (36.6 °C)    TempSrc: Oral Oral Axillary    SpO2: 99% 95% 96%    Weight:   53.5 kg (117 lb 14.4 oz)    Height:           GEN: NAD; pleasant  NEURO: Alert and oriented x4; appropriate  HEENT: Pupils are equal/reactive; normocephalic, face is symmetrical, hearing is impaired  CV: S1 S2 regular, no murmur/rub/gallops, 1/4 pedal pulses, trace b/l LE edema present  RESP: Lungs are clear bilaterally, no wheezes rales or rhonchi, on room air, no distress, respirations are easy and non labored  GI: Abdomen is flat, soft, non tender, +BS x4; non distended  : Voiding without issues  MUSC: Moves all extremities except generalized deconditioning  SKIN: frail, pale, warm, normal turgor, no rashes or lesions noted       The above physical exam was reviewed and updated as determined by my evaluation of the patient on 7/15/2023. Invasive Devices     Peripheral Intravenous Line  Duration           Long-Dwell Peripheral IV (Midline) 50/86/77 Right Basilic 11 days                   VTE Pharmacologic Prophylaxis: Eliquis  Code Status: Level 1 - Full Code  Current Length of Stay: 8 day(s)      Total time spent:  30 minutes with more than 50% spent counseling/coordinating care. Counseling includes discussion with patient re: progress  and discussion with patient of his/her current medical state/information. Coordination of patient's care was performed in conjunction with primary service. Time invested included review of patient's labs, vitals, and management of their comorbidities with continued monitoring. In addition, this patient was discussed with medical team including physician and advanced extenders. The care of the patient was extensively discussed and appropriate treatment plan was formulated unique for this patient. Medical decision making for the day was made by supervising physician unless otherwise noted in their attestation statement. ** Please Note:  voice to text software may have been used in the creation of this document.  Although proof errors in transcription or interpretation are a potential of such software**

## 2023-07-15 NOTE — PROGRESS NOTES
07/15/23 1000   Pain Assessment   Pain Assessment Tool 0-10   Pain Score 6   Pain Location/Orientation Orientation: Left; Location: Leg   Pain Onset/Description Onset: Ongoing;Frequency: Constant/Continuous   Hospital Pain Intervention(s) Repositioned; Shower/Bath;Emotional support   Restrictions/Precautions   Precautions Aspiration;Bed/chair alarms;Cognitive; Fall Risk;Pain;Supervision on toilet/commode;Contact/isolation;Visual deficit   Weight Bearing Restrictions No   ROM Restrictions No   Oral Hygiene   Type of Assistance Needed Set-up / clean-up   Physical Assistance Level No physical assistance   Comment seated in w/c at sink to brush teeth, requiring A to open toothpaste cap   Oral Hygiene CARE Score 5   Grooming   Able To Initiate Tasks;Comb/Brush Hair;Brush/Clean Teeth;Wash/Dry Hands   Limitation Noted In Coordination;Neglect;Strength;Timeliness   Findings S/U A for all grooming tasks while seated in w/c at sink   Shower/Bathe Self   Type of Assistance Needed Incidental touching;Verbal cues; Adaptive equipment   Physical Assistance Level No physical assistance   Comment Pt engaged in showering, able to wash 10/10 parts, washing UB and BLEs while seated on tub bench. CGA in stance with unilateral UE support on shower bar while pt washed santiago/rear, no LOB. Pt required vc's to sequence through washing all parts and for thoroughness. Shower/Bathe Self CARE Score 4   Bathing   Assessed Bath Style Shower   Anticipated D/C Bath Style Shower;Sponge Bath   Able to [place] Gerardo No   Able to [employer] Temperature No   Able to Wash/Rinse/Dry (body part) Left Arm;Right Arm;L Upper Leg;R Upper Leg;L Lower Leg/Foot;R Lower Leg/Foot;Chest;Abdomen;Perineal Area; Buttocks   Limitations Noted in Balance; Coordination; Endurance;Neglect; Safety;Strength;Problem Solving; Sequencing;Timeliness;Vision   Positioning Seated;Standing   Adaptive Equipment Tub Bench; Shower [employer] Noted In Balance; Coordination;Problem Solving;UE Strength;LE Strength   Adaptive Equipment Grab Bars;Transfer Bench   Assessed Shower   Findings Fei lateral side-stepping in/out of wet shower environment w/walk-in shower, transitioning hands from RW <> shower bars, no LOB. Upper Body Dressing   Type of Assistance Needed Physical assistance;Verbal cues   Physical Assistance Level 51%-75%   Comment ModA to don [place] shirt, with max vc's to utilize hernandez dressing technique and to attend to LUE when donning shirt. A for threading LUE and pulling down back   Upper Body Dressing CARE Score 2   Lower Body Dressing   Type of Assistance Needed Physical assistance;Verbal cues   Physical Assistance Level 26%-50%   Comment Fei to thread LLE through pant leg while seated, CGA in stance while pt complete CM over hips   Lower Body Dressing CARE Score 3   Putting On/Taking Off Footwear   Type of Assistance Needed Physical assistance;Verbal cues   Physical Assistance Level 26%-50%   Comment Sup while seated to doff B/L socks, but dependent to don socks   Putting On/Taking Off Footwear CARE Score 3   Dressing/Undressing Clothing   Remove UB Clothes   (hospital gown)   Don UB Clothes Pullover Shirt   Remove LB Clothes Undergarment;Socks   Don LB Clothes Pants; Undergarment;Socks   Limitations Noted In Balance; Coordination; Endurance; Safety;Strength;Timeliness;Vision;Neglect   Positioning Supported Sit;Standing   Lying to Sitting on Side of Bed   Type of Assistance Needed Incidental touching   Physical Assistance Level No physical assistance   Comment CGA   Lying to Sitting on Side of Bed CARE Score 4   Sit to Stand   Type of Assistance Needed Incidental touching;Verbal cues; Adaptive equipment   Physical Assistance Level No physical assistance   Comment CGA w/RW   Sit to Stand CARE Score 4   Bed-Chair Transfer   Type of Assistance Needed Incidental touching;Verbal cues; Adaptive equipment   Physical Assistance Level No physical assistance Comment CGA SPT w/RW, as well as short-distance fxl mob w/RW   Chair/Bed-to-Chair Transfer CARE Score 4   Toileting Hygiene   Type of Assistance Needed Physical assistance   Physical Assistance Level 26%-50%   Comment Pt cont to require A for CM over L hip while in stance at RW, pt able to complete santiago hygiene seated, and CGA for CM down   [place] Score 3   Toilet Transfer   Type of Assistance Needed Incidental touching;Verbal cues; Adaptive equipment   Physical Assistance Level No physical assistance   Comment CGA short-distance fxl mob w/RW, no LOB   Toilet Transfer CARE Score 4   Toilet Transfer   Surface Assessed Standard Toilet   Transfer Technique Standard   Limitations Noted In Balance; Endurance;UE Strength;LE Strength; Sequencing;Confidence;Problem Solving; Safety   Adaptive Equipment Grab Bar;Walker   Positioning Concerns Cognition; Safety   Cognition   Overall Cognitive Status Impaired   Arousal/Participation Alert; Cooperative   Attention Attends with cues to redirect   Orientation Level Oriented X4   Memory Decreased short term memory;Decreased recall of recent events;Decreased recall of precautions   Following Commands Follows one step commands with increased time or repetition   Activity Tolerance   Activity Tolerance Patient tolerated treatment well   Medical Staff Made Aware notified merced Burnett that pt reporting LLE pain/throbbing this morning in groin area, nsg states she is already aware and monitoring as pt has history of stroke   Assessment   Treatment Assessment Pt seen for 90min skilled OT session focused on ADL skills retraining (shower), edu/carryover of hernandez dressing techniques, bed mobility, fxl transfers and short-distance fxl mob w/RW, and toileting, for increased independence w/ADLs and decreased caregiver burden. See detailed descriptions of fxl performance above. Pt tolerated session well but was limited by LLE pain, harman notified nsg.  Pt also cont to be limited by L-sided weakness, decreased cognition, chronic back/joint pain, decreased strength/endurance, and decreased standing balance. Pt overall completed transfers and sit<>stands at CGA level today, but did required Fei for shower transfer 2* wet shower environment posing increased fall risk. Pt would benefit from continued skilled OT focused on ADL retraining, fxl transfer training, UE strengthening/ROM, endurance training, ECT edu, repetitive heranndez dressing technique edu, and act karishma. Prognosis Fair   Problem List Decreased strength;Decreased range of motion;Decreased endurance; Impaired balance;Decreased mobility; Decreased coordination;Decreased cognition; Impaired judgement;Decreased safety awareness;Pain;Decreased skin integrity   Barriers to Discharge Inaccessible home environment;Decreased caregiver support   Plan   Treatment/Interventions ADL retraining;Functional transfer training; Therapeutic exercise; Endurance training;Cognitive reorientation;Patient/family training;Equipment eval/education; Bed mobility; Compensatory technique education;Spoke to nursing   Progress Progressing toward goals   Recommendation   OT Discharge Recommendation   (pending)   OT Therapy Minutes   OT Time In 1000   OT Time Out 1130   OT Total Time (minutes) 90   OT Mode of treatment - Individual (minutes) 90   OT Mode of treatment - Concurrent (minutes) 0   OT Mode of treatment - Group (minutes) 0   OT Mode of treatment - Co-treat (minutes) 0   OT Mode of Treatment - Total time(minutes) 90 minutes   OT Cumulative Minutes 668   Therapy Time missed   Time missed?  No

## 2023-07-16 PROCEDURE — 97530 THERAPEUTIC ACTIVITIES: CPT

## 2023-07-16 PROCEDURE — 99232 SBSQ HOSP IP/OBS MODERATE 35: CPT | Performed by: INTERNAL MEDICINE

## 2023-07-16 RX ADMIN — BUPROPION HYDROCHLORIDE 100 MG: 100 TABLET, FILM COATED ORAL at 08:08

## 2023-07-16 RX ADMIN — MELATONIN TAB 3 MG 3 MG: 3 TAB at 21:35

## 2023-07-16 RX ADMIN — HYDROXYCHLOROQUINE SULFATE 200 MG: 200 TABLET ORAL at 17:48

## 2023-07-16 RX ADMIN — SPIRONOLACTONE 12.5 MG: 25 TABLET ORAL at 08:08

## 2023-07-16 RX ADMIN — BUPROPION HYDROCHLORIDE 100 MG: 100 TABLET, FILM COATED ORAL at 17:48

## 2023-07-16 RX ADMIN — PANTOPRAZOLE SODIUM 40 MG: 40 TABLET, DELAYED RELEASE ORAL at 06:33

## 2023-07-16 RX ADMIN — SIMETHICONE 80 MG: 80 TABLET, CHEWABLE ORAL at 06:33

## 2023-07-16 RX ADMIN — SIMETHICONE 80 MG: 80 TABLET, CHEWABLE ORAL at 21:30

## 2023-07-16 RX ADMIN — SIMETHICONE 80 MG: 80 TABLET, CHEWABLE ORAL at 17:47

## 2023-07-16 RX ADMIN — DULOXETINE HYDROCHLORIDE 60 MG: 60 CAPSULE, DELAYED RELEASE ORAL at 08:08

## 2023-07-16 RX ADMIN — ACETAMINOPHEN 650 MG: 325 TABLET, FILM COATED ORAL at 17:47

## 2023-07-16 RX ADMIN — ACETAMINOPHEN 650 MG: 325 TABLET, FILM COATED ORAL at 11:44

## 2023-07-16 RX ADMIN — NYSTATIN 500000 UNITS: 100000 SUSPENSION ORAL at 21:30

## 2023-07-16 RX ADMIN — LIDOCAINE 5% 1 PATCH: 700 PATCH TOPICAL at 08:08

## 2023-07-16 RX ADMIN — APIXABAN 5 MG: 5 TABLET, FILM COATED ORAL at 17:47

## 2023-07-16 RX ADMIN — NYSTATIN 500000 UNITS: 100000 SUSPENSION ORAL at 11:48

## 2023-07-16 RX ADMIN — ACETAMINOPHEN 650 MG: 325 TABLET, FILM COATED ORAL at 06:33

## 2023-07-16 RX ADMIN — LAMOTRIGINE 150 MG: 100 TABLET ORAL at 21:30

## 2023-07-16 RX ADMIN — ACETAMINOPHEN 650 MG: 325 TABLET, FILM COATED ORAL at 23:25

## 2023-07-16 RX ADMIN — GABAPENTIN 400 MG: 400 CAPSULE ORAL at 17:48

## 2023-07-16 RX ADMIN — DULOXETINE HYDROCHLORIDE 60 MG: 60 CAPSULE, DELAYED RELEASE ORAL at 17:47

## 2023-07-16 RX ADMIN — METOPROLOL SUCCINATE 25 MG: 25 TABLET, FILM COATED, EXTENDED RELEASE ORAL at 08:12

## 2023-07-16 RX ADMIN — MAGNESIUM OXIDE TAB 400 MG (241.3 MG ELEMENTAL MG) 400 MG: 400 (241.3 MG) TAB at 08:08

## 2023-07-16 RX ADMIN — APIXABAN 5 MG: 5 TABLET, FILM COATED ORAL at 08:08

## 2023-07-16 RX ADMIN — HYDROXYCHLOROQUINE SULFATE 200 MG: 200 TABLET ORAL at 08:08

## 2023-07-16 RX ADMIN — NYSTATIN 500000 UNITS: 100000 SUSPENSION ORAL at 08:08

## 2023-07-16 RX ADMIN — CINACALCET 30 MG: 30 TABLET, FILM COATED ORAL at 08:08

## 2023-07-16 RX ADMIN — MAGNESIUM OXIDE TAB 400 MG (241.3 MG ELEMENTAL MG) 400 MG: 400 (241.3 MG) TAB at 17:48

## 2023-07-16 RX ADMIN — MORPHINE SULFATE 7.5 MG: 15 TABLET ORAL at 13:45

## 2023-07-16 RX ADMIN — NYSTATIN 500000 UNITS: 100000 SUSPENSION ORAL at 17:47

## 2023-07-16 RX ADMIN — SIMETHICONE 80 MG: 80 TABLET, CHEWABLE ORAL at 11:44

## 2023-07-16 RX ADMIN — GABAPENTIN 400 MG: 400 CAPSULE ORAL at 08:08

## 2023-07-16 NOTE — PROGRESS NOTES
Internal Medicine Progress Note  Patient: Bob Arreguin  Age/sex: 70 y.o. female  Medical Record #: [id number]      ASSESSMENT/PLAN: (Interval History)  Bob Arreguin is seen and examined and management for following issues:    Left lateral thigh hematoma   • Stable  • Cbc monday     ABLA  • S/p 1 unit PRBCs 7/2/23     Thrush  • Continue Nystatin     Chronic combined systolic/diastolic heart failure   • Originally diagnosed 2004 when LVEF then was 10-15% and felt to be NICM  • Her LVEF did recover into 50's then fell when she had her CVA in 2/2023 to 30%  • Also has moderate AI/gr 2 DD  • Sees Dr Azeb Nj a LifeVest in 2/2023 when LVEF was found to be 30% which is most current  • Had been on Coreg in past but HF team switched her to Toprol when she had her stroke 2/2023  • Her soft BPs have limited starting her on ACEI/ARB as OP  • Home:  Toprol XL 25mg BID/Aldactone 25mg qd  • Here:  Toprol XL 25 mg BID   • Toprol/aldactone held for low bp  • DC aldactone 7/16  • Held again 7/16=she had issues with hypotension on her last admission as well   • May need Midodrine if meds are continuing to be held.     Hypercalcemia  • Continue Cinacalcet here as was taking at home     HS hypoxia   • using 4L NC HS at home  • Not using O2 at night here     Nightmares  • placed on Minipress when here with us after her CVA  • D/w Dr Aiken Smoke, d/c'd since had been too low to get  • Minipress dc'd 2/2 hypotension     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  • Has residual left sided weakness  • Had recrudescence of CVA 4/2023 with worsening left facial droop  • Continue Eliquis     Dysphagia  • Had admission for aspiration PNA at [facility] 5/2023 tx'd with Levaquin and Flagyl  • Continue pureed diet with NTL as at home  • Continue Ensure HP pudding TID  • ST following     Bipolar depression  • Home:  Wellbutrin 100mg BID/Cymbalta 60mg BID/Lamictal 150mg qhs  • Here:  Wellbutrin 100mg BID/Cymbalta 60mg BID/Lamictal 150mg qhs     Rheumatoid arthritis  • Continue Plaquenil 200mg BID as at home     Chronic pain/chronic opioid use  • 2/2 RA and lumbar spondylosis  • Pain management per PMR     Hx incidental right superior hypophyseal artery aneurysm  • Follows with MAXIMO Gar     CRE urine 3/2023  • Maintain contact isolation     Dye allergy   • Was dye prepped on 7/4/23 with Benadryl 50mg IV x 1 w/o issues     Incidental findings  • Pancolitis with increasing inflammatory free fluid in pelvis  • Calcified granuloma RLL     Hypokalemia  • Got Kdur 40 meq x 2 on 7/8/23  • Stable at 4.1  • Bmp monday     Enterococcus UTI  • S/p Junnie Ruck Dr Depadua = sensitive        Discharge date:  7/21/23       The above assessment and plan was reviewed and updated as determined by my evaluation of the patient on 7/16/2023.     Labs:   Results from last 7 days   Lab Units 07/13/23  0648 07/10/23  0501   WBC Thousand/uL 5.95 7.64   HEMOGLOBIN g/dL 10.1* 9.3*   HEMATOCRIT % 32.3* 29.7*   PLATELETS Thousands/uL 295 330     Results from last 7 days   Lab Units 07/13/23  0648 07/10/23  0501   SODIUM mmol/L 140 143   POTASSIUM mmol/L 4.1 4.1   CHLORIDE mmol/L 112* 112*   CO2 mmol/L 26 26   BUN mg/dL 17 18   CREATININE mg/dL 0.83 0.91   CALCIUM mg/dL 8.4 8.1*                   Review of Scheduled Meds:  Current Facility-Administered Medications   Medication Dose Route Frequency Provider Last Rate   • acetaminophen  650 mg Oral Q6H Lesa Monroy MD     • albuterol  2 puff Inhalation Q6H PRN Ronald Solo MD     • apixaban  5 mg Oral BID Ronald Solo MD     • buPROPion  100 mg Oral BID Ronald Solo MD     • cinacalcet  30 mg Oral Daily With Breakfast Ronald Solo MD     • Diclofenac Sodium  2 g Topical 4x Daily PRN Ronald Solo MD     • DULoxetine  60 mg Oral BID Ronald Solo MD     • gabapentin  400 mg Oral BID Ronald Solo MD     • hydroxychloroquine  200 mg Oral BID With Meals Ronald Solo MD     • lamoTRIgine  150 mg Oral HS Abbi Wilson MD     • lidocaine  1 patch Topical Daily LAURA Rutledge     • magnesium Oxide  400 mg Oral BID Abbi Wilson MD     • melatonin  3 mg Oral HS Abbi Wilson MD     • metoprolol succinate  25 mg Oral Daily LAURA Ordoñez     • morphine  7.5 mg Oral Q12H PRN LAURA Khalil     • nystatin  500,000 Units Swish & Spit 4x Daily LAURA Rutledge     • ondansetron  4 mg Oral Q6H PRN Abbi Wilson MD     • pantoprazole  40 mg Oral Early Morning Abbi Wilson MD     • simethicone  80 mg Oral 4x Daily (with meals and at bedtime) LAURA Coleman     • spironolactone  12.5 mg Oral Daily LAURA Ordoñez         Subjective/ HPI: Patient seen and examined. Patients overnight issues or events were reviewed with nursing or staff during rounds or morning huddle session. New or overnight issues include the following:     Pt seen in OOB in her chair this am. No complaints of dizziness or lightheadedness. Mild pain this am       ROS:   A 10 point ROS was performed; negative except as noted above.        Imaging:     No orders to display       *Labs /Radiology studies reviewed  *Medications reviewed and reconciled as needed  *Please refer to order section for additional ordered labs studies  *Case discussed with primary attending during morning huddle case rounds    Physical Examination:  Vitals:   Vitals:    07/15/23 1435 07/15/23 1951 07/16/23 0500 07/16/23 0812   BP: 106/59 97/57 98/55 106/60   BP Location: Left arm Right arm Right arm    Pulse: 88 96 94    Resp: 20 18     Temp: (!) 97.4 °F (36.3 °C) 98.1 °F (36.7 °C) (!) 97.4 °F (36.3 °C)    TempSrc: Axillary Axillary Axillary    SpO2: 93% 94% 95%    Weight:       Height:           GEN: NAD; pleasant  NEURO: Alert and oriented x4; appropriate  HEENT: Pupils are equal/reactive; normocephalic, face is symmetrical, hearing is impaired  CV: S1 S2 regular, no murmur/rub/gallops, 1/4 pedal pulses, 1/4 b/l LE edema present  RESP: Lungs are clear bilaterally, no wheezes rales or rhonchi, on room air, no distress, respirations are easy and non labored  GI: Abdomen is flat, soft, non tender, +BS x4; non distended  : Voiding without issues  MUSC: Moves all extremities except generalized deconditioning  SKIN: frail, pale, warm, normal turgor, no rashes or lesions noted       The above physical exam was reviewed and updated as determined by my evaluation of the patient on 7/16/2023. Invasive Devices     Peripheral Intravenous Line  Duration           Long-Dwell Peripheral IV (Midline) 41/99/55 Right Basilic 12 days                   VTE Pharmacologic Prophylaxis: Eliquis  Code Status: Level 1 - Full Code  Current Length of Stay: 9 day(s)      Total time spent:  30 minutes with more than 50% spent counseling/coordinating care. Counseling includes discussion with patient re: progress  and discussion with patient of his/her current medical state/information. Coordination of patient's care was performed in conjunction with primary service. Time invested included review of patient's labs, vitals, and management of their comorbidities with continued monitoring. In addition, this patient was discussed with medical team including physician and advanced extenders. The care of the patient was extensively discussed and appropriate treatment plan was formulated unique for this patient. Medical decision making for the day was made by supervising physician unless otherwise noted in their attestation statement. ** Please Note:  voice to text software may have been used in the creation of this document.  Although proof errors in transcription or interpretation are a potential of such software**

## 2023-07-16 NOTE — PROGRESS NOTES
07/16/23 1330   Pain Assessment   Pain Assessment Tool 0-10   Pain Score 7   Pain Location/Orientation Orientation: Left; Location: Hip   Restrictions/Precautions   Precautions Aspiration;Bed/chair alarms; Fall Risk;Supervision on toilet/commode;Visual deficit;Pain;Cognitive   Cognition   Overall Cognitive Status Impaired   Arousal/Participation Alert; Cooperative   Attention Attends with cues to redirect   Orientation Level Oriented X4   Memory Decreased short term memory;Decreased recall of recent events;Decreased recall of precautions   Following Commands Follows one step commands with increased time or repetition   Sit to Stand   Type of Assistance Needed Incidental touching   Physical Assistance Level No physical assistance   Sit to Stand CARE Score 4   Bed-Chair Transfer   Type of Assistance Needed Incidental touching   Physical Assistance Level No physical assistance   Chair/Bed-to-Chair Transfer CARE Score 4   Transfer Bed/Chair/Wheelchair   Limitations Noted In Balance;Problem Solving; Sequencing;UE Strength;LE Strength   Adaptive Equipment Roller Walker   Sit to [employer]   Stand to [employer] 10 Feet   Type of Assistance Needed Incidental touching   Walk 10 Feet CARE Score 4   Walk 50 Feet with Two Turns   Type of Assistance Needed Incidental touching   Walk 50 Feet with Two Turns CARE Score 4   Walk 150 Feet   Type of Assistance Needed Incidental touching   Walk 150 Feet CARE Score 4   Ambulation   Primary Mode of Locomotion Prior to Admission Walk   Distance Walked (feet) 150 ft  (x1, 10'x2 for bathroom)   Assist Device Roller Walker   Gait Pattern Slow Amy; Forward Flexion;Narrow AYLA; Poor UE WB;Step to; Step through; Improper weight shift   Limitations Noted In Heel Strike; Endurance;Posture;Speed;Strength;Swing   Provided Assistance with: Direction   Walk Assist Level Close Supervision   Does the patient walk? 2.  Yes   Toilet Transfer   Type of Assistance Needed Incidental touching   Toilet Transfer CARE Score 4   Toilet Transfer   Surface Assessed Raised Toilet   Limitations Noted In Balance;Problem Solving;UE Strength;LE Strength   Positioning Concerns Cognition;Arm Rest;Safety   Therapeutic Interventions   Other walking, toileting, transfers   [address] [facility] was requesting the nurse for pain medication at the start of the session and needed assistance finding her earring. Due to her visual deficit, she has difficulty with hand placement and this can increase her risk of a fall. When walking, she demonstrates significant forward flexion secondary to her spinal kyphosis and scoliosis. When asked if she is able to stand more upright, she said this causes more pain. Even with the RW far in front of her, she had fair balance and foot clearance. When in the bathroom, she had minor difficulty pulling her pants up due to balance impairments and needed minor assistance. She can benefit from continued skilled PT intervention to increase her strength, endurance, and balance to maximize her functional mobility and activity. Continue POC as per PT. Problem List Decreased strength;Decreased range of motion;Decreased endurance; Impaired balance;Decreased mobility; Decreased coordination;Decreased cognition; Impaired judgement;Decreased safety awareness;Pain;Orthopedic restrictions   Barriers to Discharge Inaccessible home environment;Decreased caregiver support   PT Barriers   Physical Impairment Decreased strength;Decreased range of motion;Decreased endurance; Impaired balance;Decreased mobility; Decreased coordination;Decreased cognition; Impaired judgement;Decreased safety awareness; Impaired vision;Orthopedic restrictions;Pain   Functional Limitation Car transfers;Stair negotiation;Standing;Transfers; Walking; Wheelchair management   Plan   Treatment/Interventions ADL retraining;Functional transfer training;LE strengthening/ROM; Elevations; Therapeutic exercise; Endurance training;Cognitive reorientation;Patient/family training;Equipment eval/education; Bed mobility;Gait training   Progress Progressing toward goals   Recommendation   PT Discharge Recommendation Home with home health rehabilitation   Equipment Recommended Walker   PT Therapy Minutes   PT Time In 1330   PT Time Out 1400   PT Total Time (minutes) 30   PT Mode of treatment - Individual (minutes) 30   PT Mode of treatment - Concurrent (minutes) 0   PT Mode of treatment - Group (minutes) 0   PT Mode of treatment - Co-treat (minutes) 0   PT Mode of Treatment - Total time(minutes) 30 minutes   PT Cumulative Minutes 453   Therapy Time missed   Time missed?  No

## 2023-07-17 PROCEDURE — 97530 THERAPEUTIC ACTIVITIES: CPT

## 2023-07-17 PROCEDURE — 92526 ORAL FUNCTION THERAPY: CPT

## 2023-07-17 PROCEDURE — 99233 SBSQ HOSP IP/OBS HIGH 50: CPT

## 2023-07-17 PROCEDURE — 99232 SBSQ HOSP IP/OBS MODERATE 35: CPT | Performed by: INTERNAL MEDICINE

## 2023-07-17 PROCEDURE — 97110 THERAPEUTIC EXERCISES: CPT

## 2023-07-17 PROCEDURE — 97535 SELF CARE MNGMENT TRAINING: CPT

## 2023-07-17 RX ORDER — POLYETHYLENE GLYCOL 3350 17 G/17G
17 POWDER, FOR SOLUTION ORAL DAILY PRN
Status: DISCONTINUED | OUTPATIENT
Start: 2023-07-17 | End: 2023-07-29 | Stop reason: HOSPADM

## 2023-07-17 RX ADMIN — SIMETHICONE 80 MG: 80 TABLET, CHEWABLE ORAL at 21:33

## 2023-07-17 RX ADMIN — NYSTATIN 500000 UNITS: 100000 SUSPENSION ORAL at 17:06

## 2023-07-17 RX ADMIN — APIXABAN 5 MG: 5 TABLET, FILM COATED ORAL at 17:06

## 2023-07-17 RX ADMIN — GABAPENTIN 400 MG: 400 CAPSULE ORAL at 09:37

## 2023-07-17 RX ADMIN — ACETAMINOPHEN 650 MG: 325 TABLET, FILM COATED ORAL at 12:03

## 2023-07-17 RX ADMIN — BUPROPION HYDROCHLORIDE 100 MG: 100 TABLET, FILM COATED ORAL at 17:07

## 2023-07-17 RX ADMIN — PANTOPRAZOLE SODIUM 40 MG: 40 TABLET, DELAYED RELEASE ORAL at 05:38

## 2023-07-17 RX ADMIN — HYDROXYCHLOROQUINE SULFATE 200 MG: 200 TABLET ORAL at 17:06

## 2023-07-17 RX ADMIN — DULOXETINE HYDROCHLORIDE 60 MG: 60 CAPSULE, DELAYED RELEASE ORAL at 17:06

## 2023-07-17 RX ADMIN — ACETAMINOPHEN 650 MG: 325 TABLET, FILM COATED ORAL at 18:05

## 2023-07-17 RX ADMIN — DULOXETINE HYDROCHLORIDE 60 MG: 60 CAPSULE, DELAYED RELEASE ORAL at 09:37

## 2023-07-17 RX ADMIN — LIDOCAINE 5% 1 PATCH: 700 PATCH TOPICAL at 09:35

## 2023-07-17 RX ADMIN — HYDROXYCHLOROQUINE SULFATE 200 MG: 200 TABLET ORAL at 09:43

## 2023-07-17 RX ADMIN — BUPROPION HYDROCHLORIDE 100 MG: 100 TABLET, FILM COATED ORAL at 09:42

## 2023-07-17 RX ADMIN — MELATONIN TAB 3 MG 3 MG: 3 TAB at 21:33

## 2023-07-17 RX ADMIN — SIMETHICONE 80 MG: 80 TABLET, CHEWABLE ORAL at 17:06

## 2023-07-17 RX ADMIN — CINACALCET 30 MG: 30 TABLET, FILM COATED ORAL at 09:43

## 2023-07-17 RX ADMIN — GABAPENTIN 400 MG: 400 CAPSULE ORAL at 17:06

## 2023-07-17 RX ADMIN — ONDANSETRON 4 MG: 4 TABLET, ORALLY DISINTEGRATING ORAL at 19:35

## 2023-07-17 RX ADMIN — MORPHINE SULFATE 7.5 MG: 15 TABLET ORAL at 10:47

## 2023-07-17 RX ADMIN — NYSTATIN 500000 UNITS: 100000 SUSPENSION ORAL at 21:36

## 2023-07-17 RX ADMIN — MAGNESIUM OXIDE TAB 400 MG (241.3 MG ELEMENTAL MG) 400 MG: 400 (241.3 MG) TAB at 17:06

## 2023-07-17 RX ADMIN — SIMETHICONE 80 MG: 80 TABLET, CHEWABLE ORAL at 09:51

## 2023-07-17 RX ADMIN — NYSTATIN 500000 UNITS: 100000 SUSPENSION ORAL at 14:24

## 2023-07-17 RX ADMIN — MAGNESIUM OXIDE TAB 400 MG (241.3 MG ELEMENTAL MG) 400 MG: 400 (241.3 MG) TAB at 09:37

## 2023-07-17 RX ADMIN — METOPROLOL SUCCINATE 25 MG: 25 TABLET, FILM COATED, EXTENDED RELEASE ORAL at 09:36

## 2023-07-17 RX ADMIN — SIMETHICONE 80 MG: 80 TABLET, CHEWABLE ORAL at 12:03

## 2023-07-17 RX ADMIN — APIXABAN 5 MG: 5 TABLET, FILM COATED ORAL at 09:37

## 2023-07-17 RX ADMIN — ACETAMINOPHEN 650 MG: 325 TABLET, FILM COATED ORAL at 05:38

## 2023-07-17 RX ADMIN — NYSTATIN 500000 UNITS: 100000 SUSPENSION ORAL at 09:51

## 2023-07-17 RX ADMIN — LAMOTRIGINE 150 MG: 100 TABLET ORAL at 21:33

## 2023-07-17 RX ADMIN — POLYETHYLENE GLYCOL 3350 17 G: 17 POWDER, FOR SOLUTION ORAL at 09:34

## 2023-07-17 NOTE — PROGRESS NOTES
PM&R PROGRESS NOTE:  Ayaka Park 70 y.o. female MRN: [id number]  Unit/Bed#: -01 Encounter: [id number]    Rehabilitation Diagnosis: Impairment of mobility, safety, Activities of Daily Living (ADLs), and cognitive/communication skills due to Other Disabling Impairments:  13  Other Disabling Impairments    HPI: Ayaka Park is a 70 y.o. female with medical history of HTN, CVA in 2/2023 on eliquis (chronic dysphagia, L sided weakness, cognitive impairment), hypercalcemia,  CHF, GERD, SHAR, gout, bipolar disorder, RA on plaquenil, chronic opioid dependence on morphine for chronic back/joint pain, who presented to the [address] [place] [address] [address] on 6/30 after a fall, unable to get up and disoriented. She thinks she tripped and fell down her porch steps, landing on he L thigh. Found to have a L lateral thigh hematoma with evidence of extravasation on imaging. Binder placed over hips, and eliquis was held but not reversed given her recent stroke. Speech consulted and noted oropharyngeal swallow likely at baseline and recommended Level 1/NTL. Recommended for VBS. Course c/b ABLA on 7/2 Hgb dropped to 6.8 and she was given 1 unit pRBC. Geriatrics was consulted, noted at baseline she is oriented but forgetful, with worsening issues since her stroke. Mini-Cog was 9/15 in 2021. They recommended checking B12. They noted she has not been on lorazepam since January, and also recommended weaning down off gabapentin if possible. On 7/4 patient had an unwitnessed fall while trying to reach for her bag on the window sill. No head strike, but landed on her L hip. GCS 15, Neuro exam was baseline. CTA ordered to look for active bleeding, which showed decreased size of hematoma with no active extravasation. Hgb did drop after her transfusion, but seems to have stablized today at 8.5.  That being said, her HR was elevated today, and it appears a troponin has been ordered by the trauma team as well as a CXR. CXR showed hazy RLL opacity, likely 2/2 atelectasis, and trops trended down. Metoprolol was increased with improvement. She was admitted to the [facility] on 7/7. SUBJECTIVE: Patient seen face to face. No acute issues overnight. No nightmares last night, recalls one the night prior. Discussed current pain medications regimen, patient took MSIR 15 mg daily prn at home and is ordered MSIR 7.5mg BID prn for pain. Patient understands dose is split to allow for more than one dose per day and to prevent oversedation. Voiding, LBM 7/14. Denies chest pain, shortness of breath, fever, chills, N/V, abdominal pain. ASSESSMENT: Stable, progressing    PLAN:  - constipation: LBM 7/14, Miralax given today  - UTI: antibiotics completed 7/14, oral nystatin completed 7/17  - lower extremity edema improved, continue kike stockings  - anemia: CBC 7/18  - pain management: consider scheduling am dose of MSIR 7.5 mg in morning with MSIR 7.5 mg daily prn dose in the afternoon if needed. Discussed with patient, would like to wait until tomorrow. Rehabilitation  • Functional deficits:  cognitive impairment, self-care, impaired mobility  • Continue current rehabilitation plan of care to maximize function.    • Functional update:   o PT: mobility- incidental touching, transfers- incidental touching, ambulation- close supervision '    o OT: ADL: bathing- incidental touching, dressing- UB mod-max A LB min-mod A, footwear- min-mod A, toileting- min-mod A  o SLP: aspiration precautions- dysphagia- oral stage-moderately impaired, pharyngeal stage- moderate impaired, Level 1/NTL   • Estimated Discharge: anticipated 7/21 with home PT/OT    Pain  • Tylenol 975 mg every 8 hours  • Lidocaine patch  • Gabapentin 400 mg BID  • Diclofenac Sodium topical gel QID prn  • Oxycodone 2.5-5 mg every 4 hours prn    DVT prophylaxis  • Eliquis    Bladder plan  • Continent    Bowel plan  • Continent      * Leg hematoma, left, initial encounter  Assessment & Plan  2/2 fall    6/30 CT CAP:  Large hematoma in the lateral left thigh measuring up to 9.6 cm with focus of active contrast extravasation. No acute intrathoracic or intra-abdominal injury. 7/4 CTA Pelvis:  1. Decreasing size of left lateral thigh hematoma without evidence for active extravasation. 2.  No CT findings in the visualized pelvis and thighs to explain falling hematocrit. 3.  Pancolitis with increasing inflammatory free fluid in the pelvis. No free air, pneumatosis, or encapsulated collections demonstrated. Non-operatively managed. Did require transfusion x1  Pain management as below  Monitor Hgb. PT/OT 3-5 hours/day, 5-7 days/week  Outpatient f/u with General Surgery      History of stroke  Assessment & Plan  MRI brain 2/22 - 1.  New small acute /more recent interval lacunar infarction in the right cerebral peduncle (series 4, image 14). 2.  Extensive multifocal large, previously present right middle cerebral artery infarction which is evolving with superimposed areas of evolving hemorrhage likely related to hemorrhagic transformation of subacute right MCA infarction.  No significant   mass effect. 3.  Mild, chronic microangiopathy. S/P thrombectomy 2/12 by Dr Teofilo Izquierdo   Secondary stroke prophylaxis at this time with Eliquis 5 mg twice daily   PT, OT, SLP.    Review modifiable risk factors and provide stroke education  Monitor for poststroke pain  Eliquis for secondary stroke prophylaxis    Outpatient f/u with Neurology    CHF (congestive heart failure) ([address])  Assessment & Plan  Wt Readings from Last 3 Encounters:   07/15/23 53.5 kg (117 lb 14.4 oz)   06/30/23 55.3 kg (122 lb)   06/06/23 47.9 kg (105 lb 9.6 oz)     2/2023 Echo 30% with G2DD, moderate AI  Home: Toprol XL 25mg BID, Aldactone 25mg daily   - Limited by BP - no ACE/ARB   - no SGLT2i due to recurrent UTI  Here: Toprol XL 25mg BID, spironolactone 25mg daily   Unclear etiology of cardiomyopathy  Possible consideration for ischemic work-up outpatient, poor candidate for aggressive procedures   Monitor I/O/daily weights, volume status, lytes. IM consulted to assist with management  Outpatient f/u with Dr. Ranjit Marquez      Dysphagia  Assessment & Plan  2/2 previous CVA earlier this year. Noted to have impaired oropharyngeal swallow  Was on free water protocol at home - holding for now due to thrush  On Level 1/NTL  - Inpatient SLP recommended VBS  - Consulting SLP here  - Aspiration precautions  - Strict oral hygiene prior to trials    Urinary tract infection without hematuria  Assessment & Plan  Present on admission  With incontinence and confusion  Grew 100k CFU Enterococcus    - Started on Macrobid based on antibiogram on 7/9   - sensitivity confirms susceptibility    Mixed stress and urge urinary incontinence  Assessment & Plan  Frankly incontinent here  Initiate timed voids and scans  Has history of recurrent UTIs - will check UA on admission here given extent of incontinence   - Had confusion on 7/8-7/9 overnight. - See UTI for details. Close continence care      Anemia  Assessment & Plan  2/2 L leg hematoma (most recent imaging negative for extravasation)  At home on eliquis  No reversal inpatient due to recent CVA  Prior to admission Hgb 10-12. Ravi 7/2 6.8 - given 1 unit pRBC  7/8 Hgb 8.5 > 9.2 > 9.3 > 10.1  Monitor, transfuse as appropriate. IM consulted to assist with management    Cervical dystonia  Assessment & Plan  Recommend outpatient f/u with Dr. Rebecca Davenport to evaluate for botulinum toxin injections  Appears to have a retrocollis. Sundowning  Assessment & Plan  Does wake up confused at night, but is easily redirectable  - More confusion, than agitation  - Wandered all the way down to the cafeteria on the 1st floor on 7/9  - No injuries  - Brought back to her room  - UCx with 100k enterococcus. - treat UTI  - Low bed, Q15hr checks overnight, Sleep logs  - Floor mats.    - Delirium precautions    Night terrors  Assessment & Plan  Home: Prazosin  Here: none  Continue melatonin  Outpatient f/u with PCP    Thrjhon  Assessment & Plan  Noted on admission  7/7 started on nystatin swish/spit  - 7/11 improving  - Continue for at least 7 days with 2-3 days after her antibiotics stop    Hypomagnesemia  Assessment & Plan  Home: Mag oxide 800mg BID  Here: Mag oxide 400mg BID  7/9 Mag 1.9   Goals > 2. Sinus tachycardia  Assessment & Plan  Home: Toprol XL 25mg BID  Here: Same  On 7/6 with HR in 115-120. EKG showed sinus tach. Trops trended and negative. - Associated with transient/mild SOB   - She was only being given Toprol daily in the hospital   - Increased to BID with improvement  Lytes with K > 4, Mag > 2  Monitor and adjust regimen as appropriate  IM consulted to assist with management  Outpatient f/u with Dr. Rina Flores    Obstructive sleep apnea  Assessment & Plan  Previously non-compliant with hospital or home CPAP  Outpatient f/u with PCP. Hypercalcemia  Assessment & Plan  Suspect 2/2 primary hyperparathyroidism  7/8 iCal 1.12, Phos 3.3  Home: Sensipar 30mg daily  Here: Same  Recommend outpatient f/u with Endocrinology      HTN (hypertension)  Assessment & Plan  Tends to run on the low/normal side. Home: Toprol XL 25mg BID, Spironolactone 25mg daily  Here: Same  Has not been able to get meds at times due to not meeting parameters.    Monitor and adjust as appropriate  - Spironolactone held 7/15 and d/c 7/16  IM consulted to assist with management    Bipolar depression [facility])  Assessment & Plan  Home: Bupropion 100mg BID, Cymbalta 60mg BID, Ativan 0.25mg BID PRN (however this has not been filled since January 2023 as per PDMP)  Here: Bupropion 100mg BID, Cymbalta 60mg BID  Consulting rehab psychology for support  Adjust as appropriate  Outpatient f/u with PCP    Severe protein-calorie malnutrition ([address])  Assessment & Plan  Malnutrition Findings:                                 BMI Findings: Body mass index is 20.89 kg/m². Consulted nutrition    Osteoporosis  Assessment & Plan  Hx L1 compression fx s/p kyphoplasty  Was previously on Prolia - last given in 2020   - Held due to dental issues  Recommend outpatient f/u with Endocrinology  2/23 Vitamin D level is 41.7    Gastroesophageal reflux disease without esophagitis  Assessment & Plan  Home: PPI daily  Here: PPI daily    Seronegative arthropathy of multiple sites [facility])  Assessment & Plan  Follows with Dr. Moe Vigil with Rheum and is on Hydroxychloroquine 200mg BID. Continue home regimen and follow-up with rheumatology on discharge    Mild intermittent asthma without complication  Assessment & Plan  No acute exacerbation  Home: Albuterol PRN  Here: Same    Chronic pain syndrome  Assessment & Plan  Joint and back pain  Follows with Dr. Suzanna Litten with [facility]  Reviewed PDMP - at home on MSIR 7.5mg BID PRN. Here: MSIR 7.5mg BID PRN  - Outpatient f/u with Dr. Suzanna Litten. Appreciate IM consultants medical co-management. Labs, medications, and imaging personally reviewed. ROS:  Review of Systems   A 10 point review of systems was negative except for what is noted in the HPI. OBJECTIVE:   /64   Pulse 79   Temp 97.5 °F (36.4 °C) (Axillary)   Resp 17   Ht 5' 3" (1.6 m)   Wt 53.5 kg (117 lb 14.4 oz)   SpO2 99%   BMI 20.89 kg/m²     Physical Exam  Constitutional:       Appearance: Normal appearance. HENT:      Head: Normocephalic and atraumatic. Nose: Nose normal.      Mouth/Throat:      Mouth: Mucous membranes are moist.   Cardiovascular:      Rate and Rhythm: Normal rate and regular rhythm. Pulses: Normal pulses. Heart sounds: Normal heart sounds. Pulmonary:      Effort: Pulmonary effort is normal.      Breath sounds: Normal breath sounds. Abdominal:      General: Bowel sounds are normal.      Palpations: Abdomen is soft. Musculoskeletal:         General: Normal range of motion.       Cervical back: Normal range of motion. Right lower leg: Edema present. Left lower leg: Edema present. Comments: trace   Skin:     General: Skin is warm and dry. Capillary Refill: Capillary refill takes less than 2 seconds. Findings: Bruising present. Neurological:      Mental Status: She is alert and oriented to person, place, and time.    Psychiatric:         Mood and Affect: Mood normal.         Judgment: Judgment normal.        Lab Results   Component Value Date    WBC 5.95 07/13/2023    HGB 10.1 (L) 07/13/2023    HCT 32.3 (L) 07/13/2023     (H) 07/13/2023     07/13/2023     Lab Results   Component Value Date    SODIUM 140 07/13/2023    K 4.1 07/13/2023     (H) 07/13/2023    CO2 26 07/13/2023    BUN 17 07/13/2023    CREATININE 0.83 07/13/2023    GLUC 93 07/13/2023    CALCIUM 8.4 07/13/2023     Lab Results   Component Value Date    INR 1.33 (H) 07/01/2023    INR 1.65 (H) 06/30/2023    INR 1.66 (H) 04/29/2023    PROTIME 16.7 (H) 07/01/2023    PROTIME 19.5 (H) 06/30/2023    PROTIME 19.9 (H) 04/29/2023           Current Facility-Administered Medications:   •  acetaminophen (TYLENOL) tablet 650 mg, 650 mg, Oral, Q6H [facility], Patria Molina MD, 650 mg at 07/17/23 0538  •  albuterol (PROVENTIL HFA,VENTOLIN HFA) inhaler 2 puff, 2 puff, Inhalation, Q6H PRN, Patria Molina MD, 2 puff at 07/15/23 1603  •  apixaban (ELIQUIS) tablet 5 mg, 5 mg, Oral, BID, Patria Molina MD, 5 mg at 07/17/23 9085  •  buPROPion [facility]) tablet 100 mg, 100 mg, Oral, BID, Patria Molina MD, 100 mg at 07/17/23 9045  •  cinacalcet (SENSIPAR) tablet 30 mg, 30 mg, Oral, Daily With Breakfast, Patria Molina MD, 30 mg at 07/17/23 6818  •  Diclofenac Sodium (VOLTAREN) 1 % topical gel 2 g, 2 g, Topical, 4x Daily PRN, Patria Molina MD  •  DULoxetine (CYMBALTA) delayed release capsule 60 mg, 60 mg, Oral, BID, Patria Molina MD, 60 mg at 07/17/23 2963  •  gabapentin (NEURONTIN) capsule 400 mg, 400 mg, Oral, BID, Patria Molina MD, 400 mg at 07/17/23 2906  •  hydroxychloroquine (PLAQUENIL) tablet 200 mg, 200 mg, Oral, BID With Meals, Patria Molina MD, 200 mg at 07/17/23 9583  •  lamoTRIgine (LaMICtal) tablet 150 mg, 150 mg, Oral, HS, Patria Molina MD, 150 mg at 07/16/23 2130  •  lidocaine (LIDODERM) 5 % patch 1 patch, 1 patch, Topical, Daily, LAURA Rutledge, 1 patch at 07/17/23 0935  •  magnesium Oxide (MAG-OX) tablet 400 mg, 400 mg, Oral, BID, Patria Molina MD, 400 mg at 07/17/23 1029  •  melatonin tablet 3 mg, 3 mg, Oral, HS, Patria Molina MD, 3 mg at 07/16/23 2135  •  metoprolol succinate (TOPROL-XL) 24 hr tablet 25 mg, 25 mg, Oral, Daily, LAURA Arana, 25 mg at 07/17/23 1685  •  morphine (MSIR) IR tablet 7.5 mg, 7.5 mg, Oral, Q12H PRN, LAURA Rutledge, 7.5 mg at 07/16/23 1345  •  nystatin (MYCOSTATIN) oral suspension 500,000 Units, 500,000 Units, Swish & Spit, 4x Daily, LAURA Rutledge, 500,000 Units at 07/16/23 2130  •  ondansetron (ZOFRAN-ODT) dispersible tablet 4 mg, 4 mg, Oral, Q6H PRN, Patria Molina MD, 4 mg at 07/14/23 1337  •  pantoprazole (PROTONIX) EC tablet 40 mg, 40 mg, Oral, Early Morning, Patria Molina MD, 40 mg at 07/17/23 3288  •  polyethylene glycol (MIRALAX) packet 17 g, 17 g, Oral, Daily PRN, LAURA Rutledge, 17 g at 07/17/23 0934  •  simethicone (MYLICON) chewable tablet 80 mg, 80 mg, Oral, 4x Daily (with meals and at bedtime), LAURA Nobles, 80 mg at 07/16/23 2130    Past Medical History:   Diagnosis Date   • Anxiety    • Asthma    • Bipolar depression ([address])    • Chronic narcotic dependence (HCC)    • Chronic pain    • Depression    • Enterovirus heart infection    • SHAR (obstructive sleep apnea)    • Osteoarthritis    • Peripheral neuropathy    • Plantar fasciitis of right foot    • Senile osteoporosis    • Seronegative arthropathy of multiple sites [facility])    • Undifferentiated connective tissue disease [facility])        Patient Active Problem List    Diagnosis Date Noted   • Leg hematoma, left, initial encounter 07/01/2023   • History of stroke 02/28/2023   • CHF (congestive heart failure) ([address])    • Dysphagia 03/09/2023   • Urinary tract infection without hematuria 03/06/2023   • Mixed stress and urge urinary incontinence    • Anemia 06/26/2021   • Cervical dystonia 07/11/2023   • Sundowning 07/09/2023   • Thrush 07/07/2023   • Night terrors 07/07/2023   • Cerebral aneurysm, nonruptured 05/15/2023   • Nocturnal hypoxia 05/01/2023   • Stroke-like symptoms 05/01/2023   • Insomnia 04/03/2023   • Hypomagnesemia 03/30/2023   • Sinus tachycardia 03/29/2023   • Hypokalemia 03/10/2023   • AMS (altered mental status) 03/09/2023   • Gout of foot 03/09/2023   • Wound of left ankle 03/09/2023   • Obstructive sleep apnea 02/28/2023   • History of bacteremia 02/24/2023   • Acute CVA (cerebrovascular accident) ([address]) 02/23/2023   • Hypercalcemia 02/22/2023   • Discoloration of skin of foot 02/22/2023   • Abdominal pain 02/22/2023   • HFrEF (heart failure with reduced ejection fraction) ([address]) 02/13/2023   • Stroke ([address]) 02/12/2023   • HTN (hypertension) 02/12/2023   • Cardiomyopathy ([address]) 02/07/2023   • Vitamin D insufficiency 02/07/2023   • LAILA (acute kidney injury) ([address]) 04/23/2022   • Polypharmacy 04/23/2022   • 'light-for-dates' infant with signs of fetal malnutrition 04/22/2022   • Bipolar depression ([address]) 03/04/2022   • Rheumatoid arthritis of multiple sites with negative rheumatoid factor ([address]) 03/04/2022   • Closed fracture of multiple ribs of right side 03/02/2022   • Traumatic pneumothorax 03/02/2022   • Closed fracture of transverse process of lumbar vertebra ([address]) 03/02/2022   • Liver contusion 03/02/2022   • Fall 03/02/2022   • Acute pain due to trauma 03/02/2022   • Right shoulder pain 03/02/2022   • Severe protein-calorie malnutrition ([address]) 03/02/2022   • Undifferentiated connective tissue disease ([address]) 12/29/2021   • Primary generalized (osteo)arthritis 12/29/2021   • Peripheral neuropathy 12/29/2021   • Osteoporosis 12/29/2021   • Hx of compression fracture of spine 12/29/2021   • Lumbar spondylosis 12/29/2021   • Cervical spondylosis 12/29/2021   • Chronic pain syndrome    • Mild intermittent asthma without complication    • Ambulatory dysfunction    • Seronegative arthropathy of multiple sites ([address])    • Gastroesophageal reflux disease without esophagitis         LAURA Anderson  [facility] [place]

## 2023-07-17 NOTE — PROGRESS NOTES
07/17/23 1000   Pain Assessment   Pain Assessment Tool 0-10   Pain Score 7   Pain Location/Orientation Orientation: Left; Location: Hip;Location: Groin; Location: Back   Effect of Pain on Daily Activities limited activity tolerance today requiring frequent rest breaks   Hospital Pain Intervention(s) Rest;Repositioned  (communicated with RN to see if patient due for pain medication)   Restrictions/Precautions   Precautions Aspiration;Bed/chair alarms;Cognitive; Fall Risk;Visual deficit; Pain   Cognition   Overall Cognitive Status Impaired   Arousal/Participation Alert; Cooperative   Attention Attends with cues to redirect   Orientation Level Oriented X4   Memory Decreased short term memory;Decreased recall of recent events;Decreased recall of precautions   Following Commands Follows one step commands with increased time or repetition   Subjective   Subjective Patient reports she is in alot of pain today. Sit to Stand   Type of Assistance Needed Incidental touching   Comment CS/CGA with RW   Sit to Stand CARE Score 4   Bed-Chair Transfer   Type of Assistance Needed Incidental touching   Comment CGA with RW   Chair/Bed-to-Chair Transfer CARE Score 4   Transfer Bed/Chair/Wheelchair   Limitations Noted In Balance;Pain Management;Problem Solving; Sequencing;LE Strength;UE Strength   Car Transfer   Type of Assistance Needed Physical assistance   Physical Assistance Level 25% or less   Comment Able to get LEs into the car with Supervision.  Required Fei to lift LLE out of the car   Car Transfer CARE Score 3   Walk 10 Feet   Type of Assistance Needed Supervision   Comment CS with RW   Walk 10 Feet CARE Score 4   Walk 50 Feet with Two Turns   Type of Assistance Needed Incidental touching   Comment CGA with RW   Walk 50 Feet with Two Turns CARE Score 4   Walk 150 Feet   Type of Assistance Needed Incidental touching   Comment CGA with RW   Walk 150 Feet CARE Score 4   Walking 10 Feet on Uneven Surfaces   Type of Assistance Needed Incidental touching   Comment CGA with RW   Walking 10 Feet on Uneven Surfaces CARE Score 4   Ambulation   Primary Mode of Locomotion Prior to Admission Walk   Distance Walked (feet) 178 ft  (93)   Assist Device Roller Walker   Gait Pattern Slow Amy;Narrow AYLA; Improper weight shift; Step through; Forward Flexion   Limitations Noted In Heel Strike;Posture; Safety;Speed;Swing;Strength   Walk Assist Level Close Supervision;Contact Guard   Does the patient walk? 2. Yes   Curb or Single Stair   Style negotiated Curb   Type of Assistance Needed Physical assistance   Physical Assistance Level 25% or less   Comment 6 inch curb with RW x2 trials. Initial trial required Fei for management of RW, second trial, required CGA   1 Step (Curb) CARE Score 3   Toilet Transfer   Type of Assistance Needed Incidental touching   Comment CGA with RW   Toilet Transfer CARE Score 4   Therapeutic Interventions   Strengthening Seated knee extension 1.5# 3x12 each LE, seated hamstring curls with YTB 3x15, seated hip adduction with ball 3x15, seated hip abduction with YTB 3x15   Assessment   Treatment Assessment Patient participated in 90 minute skilled physical therapy session. Patient requires frequent rest breaks 2/2 to reports of increasing pain with activity. Patient noted to be grimacing with pain at rest. Progressed to close supervision for short distance ambulation of 10 ft with RW. Communicated with RN and patient provided with pain medication. Spoke with daughter, Agatha Boroks, at end of session via telephone. Patient is planned for discharge on Friday, 7/21. Inquired about family training. Agatha Brooks feels confident in being able to assist her mother as she was doing prior to admission. Declining family training at tis time for PT. Problem List Decreased strength;Decreased range of motion;Decreased endurance; Impaired balance;Decreased mobility; Decreased coordination;Decreased cognition; Impaired judgement;Decreased safety awareness;Pain;Orthopedic restrictions   Barriers to Discharge Inaccessible home environment;Decreased caregiver support   PT Barriers   Functional Limitation Car transfers;Stair negotiation;Transfers; Walking   Plan   Treatment/Interventions Functional transfer training;LE strengthening/ROM; Elevations; Endurance training; Therapeutic exercise;Patient/family training;Bed mobility;Gait training;Equipment eval/education   Progress Progressing toward goals   Recommendation   PT Discharge Recommendation Home with home health rehabilitation   PT Therapy Minutes   PT Time In 1000   PT Time Out 1130   PT Total Time (minutes) 90   PT Mode of treatment - Individual (minutes) 90   PT Mode of treatment - Concurrent (minutes) 0   PT Mode of treatment - Group (minutes) 0   PT Mode of treatment - Co-treat (minutes) 0   PT Mode of Treatment - Total time(minutes) 90 minutes   PT Cumulative Minutes 543   Therapy Time missed   Time missed?  No     Maddie Orozco, PT, DPT

## 2023-07-17 NOTE — PROGRESS NOTES
07/17/23 1130   Pain Assessment   Pain Assessment Tool 0-10   Pain Score No Pain   Restrictions/Precautions   Precautions Aspiration;Bed/chair alarms;Cognitive; Fall Risk;Visual deficit; Pain   Weight Bearing Restrictions No   ROM Restrictions No   Eating   Type of Assistance Needed Set-up / clean-up;Supervision;Verbal cues   Physical Assistance Level No physical assistance   Eating CARE Score 4   Swallow Assessment   Swallow Treatment Assessment Daily Dysphagia Tx Note      Patient Name: Aldo Alston     [id number] Date: 7/17/2023        Current Risks for Dysphagia & Aspiration: Weak voicing; weak cough; dysarthria; general debilitation; new neuro event; brain injury; cognitive deficit; hx neurologic dx; positioning issues     Current Symptoms/Concerns: difficulty chewing; decreased oral intake; hx of dysphagia/aspiration; throat clearing; cough with intake     Current diet:puree/level 1 diet and nectar thick liquids      Premorbid diet::puree/level 1 diet and nectar thick liquids      Positioning: upright in wheelchair     Items administered:Consistencies Administered: puree and NTL  Materials administered included puree (mac cheese, carrots, eggs, peaches), yogurt, pudding, and NTL by cup     Total amount of meal consumed:   75%  180cc by cup sips        Oral stage:mild to moderate  Lip closure: complete to cup and tsp  Anterior spillage: none this meal  Mastication: mild-moderately prolonged with dryer puree, more prompt with wet purees and NTL  Bolus formation: decreased   Bolus control: suspect reduced  Transfer: slow and delayed with foods, more timely with liquids  Oral residue: none  Pocketing: none        Pharyngeal stage:mild to moderate  Swallow promptness: suspected delayed  Hyolaryngeal elevation: present but reduced  Wet voice: none  Throat clear: none  Cough: none  Secondary swallows: piecemeal transfers completed with puree and liquids  Audible swallows: present with liquids and purees Esophageal stage:  Belching present and pt feeling "full"- hx GI s/s (see previous notes)        Summary:     Pt presenting with mild to moderate oral and pharyngeal dysphagia this session. Symptoms or concerns included slow manipulation of puree, reduced bolus formation, suspected reduced bolus control, slow and at times labored transfers, suspected pharyngeal swallow delay, suspected reduced hyolaryngeal elevation, secondary swallows, audible swallows as well as GI s/s present      Pt set up with meal tray and able to self feed independently. Pt with good retrieval and control of all textures this meal- no anterior loss present. Oral processing of dryer purees more prolonged than those of wet purees. Transfers and swallows were slow but complete. Encouraged alternating with liquids- NTL by cup- good retrieval and control, transfers and swallows more timely- no s/s aspiration. Nursing provided meds- whole with pudding- tolerated well. Confirmed with Renay EGAN meds can be whole in puree at this time. At end of session, provided pt with folder and materials for gel thickener, examples on where to purchase [employer]) as well as the bulk thickener versus single serve packets for travel. Also provided handout on puree diet with "do's and don'ts". Last found list of high protein puree recipes for at home. Pt reported family recently purchased new  so they are "ready to go". Recommendations:  Diet: puree/level 1 diet and nectar thick liquids  Meds: whole or cut with puree  Strategies: upright posture, only feed when fully alert, slow rate of feeding, small bites/sips, effortful swallow, quiet environment (tv off, limit talking, door closed, etc.) and alternating bites and sips     FULL supervision w/ meals. Results reviewed with: patientBang RN  Aspiration precautions posted.      F/u ST tx: Pt is currently recommended for further skilled SLP services targeting dysphagia therapy in order to maximize oral and pharyngeal swallow skills, while safely supporting PO intake, as well as to improve independent carryover of safe swallow strategies. Plan: Cont to monitor pt on current diet              Trial upgraded textures of dysphagia level 2 and thin liquids as appropriate              Will benefit from f/u with GI team which pt follows with [facility] given esophageal concerns. Cont education on puree diet and NTL with strategies for at home (education folder started in room with handouts)     Swallow Assessment Prognosis   Prognosis Fair   Prognosis Considerations Age; Co-morbidities; Medical diagnosis; Medical prognosis;Previous level of function;Severity of impairments;New learning ability;Ability to carry over; Cooperation   SLP Therapy Minutes   SLP Time In 1130   SLP Time Out 1230   SLP Total Time (minutes) 60   SLP Mode of treatment - Individual (minutes) 60   SLP Mode of treatment - Concurrent (minutes) 0   SLP Mode of treatment - Group (minutes) 0   SLP Mode of treatment - Co-treat (minutes) 0   SLP Mode of Treatment - Total time(minutes) 60 minutes   SLP Cumulative Minutes 360   Therapy Time missed   Time missed?  No

## 2023-07-17 NOTE — PROGRESS NOTES
OT Daily Treatment Note       07/17/23 1300   Pain Assessment   Pain Assessment Tool 0-10   Pain Score 5   Pain Location/Orientation Orientation: Left; Location: Leg   Hospital Pain Intervention(s) Elevated;Repositioned; Emotional support   Restrictions/Precautions   Precautions Aspiration;Bed/chair alarms;Cognitive; Fall Risk;Visual deficit; Pain   Lifestyle   Autonomy "I just feel like I can't win anymore"   Eating   Comment at start of session, pt observed to have a very dry mouth demo difficulty speaking clearly. pt requested gingerale. OT prepared 7.5 oz of gingerale with two thick-it packets making it to a nectar consistency. time spent providing supv during drinks while seated at EOB without need for vc. Sit to Stand   Type of Assistance Needed Supervision   Physical Assistance Level No physical assistance   Comment RW   Sit to Stand CARE Score 4   Bed-Chair Transfer   Type of Assistance Needed Supervision   Physical Assistance Level No physical assistance   Comment SPT with RW   Chair/Bed-to-Chair Transfer CARE Score 4   Toileting Hygiene   Type of Assistance Needed Physical assistance   Physical Assistance Level 25% or less   Comment min A for CM up posteriorly due to dec LUE coordination/inattention   Toileting Hygiene CARE Score 3   Toilet Transfer   Type of Assistance Needed Supervision   Physical Assistance Level No physical assistance   Comment RW   Toilet Transfer CARE Score 4   Neuromuscular Education   Comments Pt engaged in Neofect training activities for ~35 mins focusing on LUE coordination, hand/eye coordination, L AROM, visual scanning and perception, selected and divded attn and concept formation. pt reports motivation with these activites stating she feels like she is encouraged by her ability to complete the task successfully. Coordination   Fine Motor pt engaged in resistive clothespin activity focusing on L fine motor strength, coordination, L attention and LUE AROM.  pt required modA to complete as pt demo poor coordination/pincer grasp, requiring OT to hold clothespin in corrrection orientation for pt. pt demo dec frustration tolerance and made comment re: feeling like she "cant win anymore" so task then terminated and moved on to a more encouraging activity (neofect). see above for details. Cognition   Overall Cognitive Status Impaired   Arousal/Participation Alert; Cooperative   Attention Attends with cues to redirect   Orientation Level Oriented X4   Memory Decreased short term memory;Decreased recall of recent events;Decreased recall of precautions   Following Commands Follows one step commands with increased time or repetition   Activity Tolerance   Activity Tolerance Patient tolerated treatment well   Assessment   Treatment Assessment Pt participated in skilled OT session with focus on ADL retraining, functional transfer training, UE strengthening/ROM, cognitive reorientation, neuro re-ed, compensatory technique education, continued education and energy conservation. See flowsheet for details of session and current functional status. Pt is limited by weakness, decreased ROM, impaired balance, decreased endurance, increased fall risk, decreased ADLS, pain, decreased activity tolerance, decreased safety awareness, impaired judgement, decreased cognition, decreased strength and visual deficits and requires skilled OT services to increase independence and safety with ADL completion in prep for DC home. Plan to continue ADL retraining, functional transfer training, UE strengthening/ROM, endurance training, Pt/caregiver education, compensatory technique education, continued education, energy conservation and activity engagement  to address barriers mentioned above. Prognosis Fair   Problem List Decreased strength;Decreased range of motion;Decreased endurance; Impaired balance;Decreased mobility; Decreased coordination;Decreased cognition; Impaired judgement;Decreased safety awareness;Pain;Orthopedic restrictions   Barriers to Discharge Inaccessible home environment;Decreased caregiver support   Plan   Treatment/Interventions ADL retraining;Functional transfer training; Therapeutic exercise; Endurance training;Patient/family training; Compensatory technique education   Progress Progressing toward goals   OT Therapy Minutes   OT Time In 1300   OT Time Out 1430   OT Total Time (minutes) 90   OT Mode of treatment - Individual (minutes) 90   OT Mode of treatment - Concurrent (minutes) 0   OT Mode of treatment - Group (minutes) 0   OT Mode of treatment - Co-treat (minutes) 0   OT Mode of Treatment - Total time(minutes) 90 minutes   OT Cumulative Minutes 758   Therapy Time missed   Time missed?  No

## 2023-07-17 NOTE — PROGRESS NOTES
Internal Medicine Progress Note  Patient: Uriel Hooper  Age/sex: 70 y.o. female  Medical Record #: [id number]      ASSESSMENT/PLAN: (Interval History)  Uriel Hooper is seen and examined and management for following issues:    Left lateral thigh hematoma   • Stable  • CBC 7/18/23     ABLA  • S/p 1 unit PRBCs 7/2/23  • CBC 7/18/23     Thrush  • S/p Nystatin     Chronic combined systolic/diastolic heart failure   • Originally diagnosed 2004 when LVEF then was 10-15% and felt to be NICM  • Her LVEF did recover into 50's then fell when she had her CVA in 2/2023 to 30%  • Also has moderate AI/gr 2 DD  • Sees Dr Jennifer Trevino a Life[facility]est in 2/2023 when LVEF was found to be 30% which is most current  • Had been on Coreg in past but HF team switched her to Toprol when she had her stroke 2/2023  • Her soft BPs have limited starting her on ACEI/ARB as OP  • Home:  Toprol XL 25mg BID/Aldactone 25mg qd  • Here:  Toprol XL 25 mg QD = able to get last 2 days  • D/C'd Aldactone 7/16  • May need Midodrine if meds are continuing to be held.     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  • Has residual left sided weakness  • Had recrudescence of CVA 4/2023 with worsening left facial droop  • Continue Eliquis     Dysphagia  • Had admission for aspiration [facility] at [facility] tx'd with Levaquin and Flagyl  • Continue pureed diet with NTL as at home  • Continue Ensure HP pudding TID  • ST following     Bipolar depression  • Home:  Wellbutrin 100mg BID/Cymbalta 60mg BID/Lamictal 150mg qhs  • Here:  Wellbutrin 100mg BID/Cymbalta 60mg BID/Lamictal 150mg qhs     Rheumatoid arthritis  • Continue Plaquenil 200mg BID as at home     Chronic pain/chronic opioid use  • 2/2 RA and lumbar spondylosis  • Pain management per PMR     Hx incidental right superior hypophyseal artery aneurysm  • Follows with NS Dr Pennie Perez     CRE urine 3/2023  • Maintain contact isolation     Dye allergy   • Was dye prepped on 7/4/23 with Benadryl 50mg IV x 1 w/o issues     Incidental findings  • Pancolitis with increasing inflammatory free fluid in pelvis  • Calcified granuloma RLL     Hypokalemia  • Got Kdur 40 meq x 2 on 7/8/23  • Stable at 4.1  • BMP 7/18 (stuck 4 times and then unable to run specimen so do AM)     Enterococcus UTI  • S/p Indu Motts Dr Depadua = sensitive        Discharge date:  7/21/23       The above assessment and plan was reviewed and updated as determined by my evaluation of the patient on 7/17/2023.     Labs:   Results from last 7 days   Lab Units 07/13/23  0648   WBC Thousand/uL 5.95   HEMOGLOBIN g/dL 10.1*   HEMATOCRIT % 32.3*   PLATELETS Thousands/uL 295     Results from last 7 days   Lab Units 07/13/23  0648   SODIUM mmol/L 140   POTASSIUM mmol/L 4.1   CHLORIDE mmol/L 112*   CO2 mmol/L 26   BUN mg/dL 17   CREATININE mg/dL 0.83   CALCIUM mg/dL 8.4                   Review of Scheduled Meds:  Current Facility-Administered Medications   Medication Dose Route Frequency Provider Last Rate   • acetaminophen  650 mg Oral Q6H Dick Mcallister MD     • albuterol  2 puff Inhalation Q6H PRN Eve Lanier MD     • apixaban  5 mg Oral BID Eve Lnaier MD     • buPROPion  100 mg Oral BID Eve Lanier MD     • cinacalcet  30 mg Oral Daily With Breakfast Eve Lanier MD     • Diclofenac Sodium  2 g Topical 4x Daily PRN Eve Lanier MD     • DULoxetine  60 mg Oral BID Eve Lanier MD     • gabapentin  400 mg Oral BID Eve Lanier MD     • hydroxychloroquine  200 mg Oral BID With Meals Eve Lanier MD     • lamoTRIgine  150 mg Oral HS Eve Lanier MD     • lidocaine  1 patch Topical Daily LAURA Mueller     • magnesium Oxide  400 mg Oral BID Namrata Verduzco MD     • melatonin  3 mg Oral HS Namrata Verduzco MD     • metoprolol succinate  25 mg Oral Daily LAURA Enciso     • morphine  7.5 mg Oral Q12H PRN LAURA Mueller     • nystatin  500,000 Units Swish & Spit 4x Daily LAURA Rutledge     • ondansetron  4 mg Oral Q6H PRN Namrata Verduzco MD     • pantoprazole  40 mg Oral Early Morning Namrata Verduzco MD     • polyethylene glycol  17 g Oral Daily PRN LAURA Rutledge     • simethicone  80 mg Oral 4x Daily (with meals and at bedtime) Leotha Apley, CRNP         Subjective/ HPI: Patient seen and examined. Patients overnight issues or events were reviewed with nursing or staff during rounds or morning huddle session. New or overnight issues include the following:     No new or overnight issues. Offers no complaints    ROS:   A 10 point ROS was performed; negative except as noted above.        Imaging:     No orders to display       *Labs /Radiology studies reviewed  *Medications reviewed and reconciled as needed  *Please refer to order section for additional ordered labs studies  *Case discussed with primary attending during morning huddle case rounds    Physical Examination:  Vitals:   Vitals:    07/16/23 1426 07/16/23 2002 07/17/23 0641 07/17/23 0936   BP: 94/52 118/59 121/65 132/64   BP Location: Right arm Left arm Left arm    Pulse: 94 98 88 79   Resp: 17 17     Temp: 98.2 °F (36.8 °C) 98.4 °F (36.9 °C) 97.5 °F (36.4 °C)    TempSrc: Axillary Axillary Axillary    SpO2: 95% 95% 99%    Weight:       Height:           General Appearance: no distress, conversive  HEENT: PERRLA, conjuctiva normal; oropharynx clear; mucous membranes moist   Neck:  Supple, normal ROM  Lungs: CTA, normal respiratory effort, no retractions, expiratory effort normal  CV: regular rate and rhythm; no rubs/murmurs/gallops, PMI normal   ABD: soft; ND/NT; +BS  EXT: no edema  Skin: normal turgor, normal texture, no rashes  Psych: affect normal, mood normal  Neuro: AAO      The above physical exam was reviewed and updated as determined by my evaluation of the patient on 7/17/2023. Invasive Devices     None                    VTE Pharmacologic Prophylaxis: Eliquis  Code Status: Level 1 - Full Code  Current Length of Stay: 10 day(s)      Total time spent:  30 minutes with more than 50% spent counseling/coordinating care. Counseling includes discussion with patient re: progress  and discussion with patient of his/her current medical state/information. Coordination of patient's care was performed in conjunction with primary service. Time invested included review of patient's labs, vitals, and management of their comorbidities with continued monitoring. In addition, this patient was discussed with medical team including physician and advanced extenders. The care of the patient was extensively discussed and appropriate treatment plan was formulated unique for this patient. Medical decision making for the day was made by supervising physician unless otherwise noted in their attestation statement. ** Please Note:  voice to text software may have been used in the creation of this document.  Although proof errors in transcription or interpretation are a potential of such software**

## 2023-07-18 LAB
ANION GAP SERPL CALCULATED.3IONS-SCNC: 4 MMOL/L
BASOPHILS # BLD AUTO: 0.03 THOUSANDS/ÂΜL (ref 0–0.1)
BASOPHILS NFR BLD AUTO: 1 % (ref 0–1)
BUN SERPL-MCNC: 18 MG/DL (ref 5–25)
CALCIUM SERPL-MCNC: 8.9 MG/DL (ref 8.3–10.1)
CHLORIDE SERPL-SCNC: 109 MMOL/L (ref 96–108)
CO2 SERPL-SCNC: 30 MMOL/L (ref 21–32)
CREAT SERPL-MCNC: 0.84 MG/DL (ref 0.6–1.3)
EOSINOPHIL # BLD AUTO: 0 THOUSAND/ÂΜL (ref 0–0.61)
EOSINOPHIL NFR BLD AUTO: 0 % (ref 0–6)
ERYTHROCYTE [DISTWIDTH] IN BLOOD BY AUTOMATED COUNT: 20.8 % (ref 11.6–15.1)
GFR SERPL CREATININE-BSD FRML MDRD: 71 ML/MIN/1.73SQ M
GLUCOSE P FAST SERPL-MCNC: 82 MG/DL (ref 65–99)
GLUCOSE SERPL-MCNC: 82 MG/DL (ref 65–140)
HCT VFR BLD AUTO: 30.9 % (ref 34.8–46.1)
HGB BLD-MCNC: 9.9 G/DL (ref 11.5–15.4)
IMM GRANULOCYTES # BLD AUTO: 0.02 THOUSAND/UL (ref 0–0.2)
IMM GRANULOCYTES NFR BLD AUTO: 0 % (ref 0–2)
LYMPHOCYTES # BLD AUTO: 1.26 THOUSANDS/ÂΜL (ref 0.6–4.47)
LYMPHOCYTES NFR BLD AUTO: 25 % (ref 14–44)
MCH RBC QN AUTO: 32.1 PG (ref 26.8–34.3)
MCHC RBC AUTO-ENTMCNC: 32 G/DL (ref 31.4–37.4)
MCV RBC AUTO: 100 FL (ref 82–98)
MONOCYTES # BLD AUTO: 0.46 THOUSAND/ÂΜL (ref 0.17–1.22)
MONOCYTES NFR BLD AUTO: 9 % (ref 4–12)
NEUTROPHILS # BLD AUTO: 3.3 THOUSANDS/ÂΜL (ref 1.85–7.62)
NEUTS SEG NFR BLD AUTO: 65 % (ref 43–75)
NRBC BLD AUTO-RTO: 0 /100 WBCS
PLATELET # BLD AUTO: 220 THOUSANDS/UL (ref 149–390)
PMV BLD AUTO: 10.8 FL (ref 8.9–12.7)
POTASSIUM SERPL-SCNC: 4.3 MMOL/L (ref 3.5–5.3)
RBC # BLD AUTO: 3.08 MILLION/UL (ref 3.81–5.12)
SODIUM SERPL-SCNC: 143 MMOL/L (ref 135–147)
WBC # BLD AUTO: 5.07 THOUSAND/UL (ref 4.31–10.16)

## 2023-07-18 PROCEDURE — 85025 COMPLETE CBC W/AUTO DIFF WBC: CPT

## 2023-07-18 PROCEDURE — 99232 SBSQ HOSP IP/OBS MODERATE 35: CPT

## 2023-07-18 PROCEDURE — 80048 BASIC METABOLIC PNL TOTAL CA: CPT

## 2023-07-18 PROCEDURE — 97530 THERAPEUTIC ACTIVITIES: CPT

## 2023-07-18 PROCEDURE — 97116 GAIT TRAINING THERAPY: CPT

## 2023-07-18 PROCEDURE — 99232 SBSQ HOSP IP/OBS MODERATE 35: CPT | Performed by: INTERNAL MEDICINE

## 2023-07-18 PROCEDURE — 92526 ORAL FUNCTION THERAPY: CPT

## 2023-07-18 PROCEDURE — 97110 THERAPEUTIC EXERCISES: CPT

## 2023-07-18 PROCEDURE — 97535 SELF CARE MNGMENT TRAINING: CPT

## 2023-07-18 RX ORDER — MORPHINE SULFATE 15 MG/1
7.5 TABLET ORAL 2 TIMES DAILY PRN
Status: DISCONTINUED | OUTPATIENT
Start: 2023-07-18 | End: 2023-07-19

## 2023-07-18 RX ORDER — LANOLIN ALCOHOL/MO/W.PET/CERES
400 CREAM (GRAM) TOPICAL 2 TIMES DAILY
Qty: 60 TABLET | Refills: 0 | OUTPATIENT
Start: 2023-07-18

## 2023-07-18 RX ORDER — MORPHINE SULFATE 15 MG/1
7.5 TABLET ORAL ONCE
Status: COMPLETED | OUTPATIENT
Start: 2023-07-18 | End: 2023-07-18

## 2023-07-18 RX ADMIN — SIMETHICONE 80 MG: 80 TABLET, CHEWABLE ORAL at 09:01

## 2023-07-18 RX ADMIN — DULOXETINE HYDROCHLORIDE 60 MG: 60 CAPSULE, DELAYED RELEASE ORAL at 09:42

## 2023-07-18 RX ADMIN — MAGNESIUM OXIDE TAB 400 MG (241.3 MG ELEMENTAL MG) 400 MG: 400 (241.3 MG) TAB at 17:58

## 2023-07-18 RX ADMIN — SIMETHICONE 80 MG: 80 TABLET, CHEWABLE ORAL at 12:09

## 2023-07-18 RX ADMIN — HYDROXYCHLOROQUINE SULFATE 200 MG: 200 TABLET ORAL at 16:40

## 2023-07-18 RX ADMIN — MORPHINE SULFATE 7.5 MG: 15 TABLET ORAL at 01:25

## 2023-07-18 RX ADMIN — ACETAMINOPHEN 650 MG: 325 TABLET, FILM COATED ORAL at 12:09

## 2023-07-18 RX ADMIN — MAGNESIUM OXIDE TAB 400 MG (241.3 MG ELEMENTAL MG) 400 MG: 400 (241.3 MG) TAB at 09:43

## 2023-07-18 RX ADMIN — METOPROLOL SUCCINATE 25 MG: 25 TABLET, FILM COATED, EXTENDED RELEASE ORAL at 09:44

## 2023-07-18 RX ADMIN — DULOXETINE HYDROCHLORIDE 60 MG: 60 CAPSULE, DELAYED RELEASE ORAL at 17:58

## 2023-07-18 RX ADMIN — LIDOCAINE 5% 1 PATCH: 700 PATCH TOPICAL at 09:43

## 2023-07-18 RX ADMIN — APIXABAN 5 MG: 5 TABLET, FILM COATED ORAL at 09:43

## 2023-07-18 RX ADMIN — MORPHINE SULFATE 7.5 MG: 15 TABLET ORAL at 12:09

## 2023-07-18 RX ADMIN — BUPROPION HYDROCHLORIDE 100 MG: 100 TABLET, FILM COATED ORAL at 09:42

## 2023-07-18 RX ADMIN — APIXABAN 5 MG: 5 TABLET, FILM COATED ORAL at 17:58

## 2023-07-18 RX ADMIN — CINACALCET 30 MG: 30 TABLET, FILM COATED ORAL at 09:03

## 2023-07-18 RX ADMIN — PANTOPRAZOLE SODIUM 40 MG: 40 TABLET, DELAYED RELEASE ORAL at 05:04

## 2023-07-18 RX ADMIN — MORPHINE SULFATE 7.5 MG: 15 TABLET ORAL at 20:33

## 2023-07-18 RX ADMIN — SIMETHICONE 80 MG: 80 TABLET, CHEWABLE ORAL at 21:27

## 2023-07-18 RX ADMIN — ACETAMINOPHEN 650 MG: 325 TABLET, FILM COATED ORAL at 17:58

## 2023-07-18 RX ADMIN — ACETAMINOPHEN 650 MG: 325 TABLET, FILM COATED ORAL at 05:04

## 2023-07-18 RX ADMIN — GABAPENTIN 400 MG: 400 CAPSULE ORAL at 09:42

## 2023-07-18 RX ADMIN — LAMOTRIGINE 150 MG: 100 TABLET ORAL at 21:27

## 2023-07-18 RX ADMIN — GABAPENTIN 400 MG: 400 CAPSULE ORAL at 17:58

## 2023-07-18 RX ADMIN — MELATONIN TAB 3 MG 3 MG: 3 TAB at 20:33

## 2023-07-18 RX ADMIN — HYDROXYCHLOROQUINE SULFATE 200 MG: 200 TABLET ORAL at 09:03

## 2023-07-18 RX ADMIN — BUPROPION HYDROCHLORIDE 100 MG: 100 TABLET, FILM COATED ORAL at 17:58

## 2023-07-18 RX ADMIN — SIMETHICONE 80 MG: 80 TABLET, CHEWABLE ORAL at 16:40

## 2023-07-18 NOTE — PLAN OF CARE
Problem: PAIN - ADULT  Goal: Verbalizes/displays adequate comfort level or baseline comfort level  Description: Interventions:  - Encourage patient to monitor pain and request assistance  - Assess pain using appropriate pain scale  - Administer analgesics based on type and severity of pain and evaluate response  - Implement non-pharmacological measures as appropriate and evaluate response  - Consider cultural and social influences on pain and pain management  - Notify physician/advanced practitioner if interventions unsuccessful or patient reports new pain  Outcome: Progressing     Problem: INFECTION - ADULT  Goal: Absence or prevention of progression during hospitalization  Description: INTERVENTIONS:  - Assess and monitor for signs and symptoms of infection  - Monitor lab/diagnostic results  - Monitor all insertion sites, i.e. indwelling lines, tubes, and drains  - Monitor endotracheal if appropriate and nasal secretions for changes in amount and color  - [place] appropriate cooling/warming therapies per order  - Administer medications as ordered  - Instruct and encourage patient and family to use good hand hygiene technique  - Identify and instruct in appropriate isolation precautions for identified infection/condition  Outcome: Progressing  Goal: Absence of fever/infection during neutropenic period  Description: INTERVENTIONS:  - Monitor WBC    Outcome: Progressing     Problem: SAFETY ADULT  Goal: Patient will remain free of falls  Description: INTERVENTIONS:  - Educate patient/family on patient safety including physical limitations  - Instruct patient to call for assistance with activity   - Consult OT/PT to assist with strengthening/mobility   - Keep Call bell within reach  - Keep bed low and locked with side rails adjusted as appropriate  - Keep care items and personal belongings within reach  - Initiate and maintain comfort rounds  - Make Fall Risk Sign visible to staff  - Offer Toileting every  Hours, in advance of need  - Ini  - Obtain necessary fall risk management equip  - Apply yellow socks and bracelet for high fall risk patients  - Consider moving patient to room near nurses station  Outcome: Progressing  Goal: Maintain or return to baseline ADL function  Description: INTERVENTIONS:  -  Assess patient's ability to carry out ADLs; assess patient's baseline for ADL function and identify physical deficits which impact ability to perform ADLs (bathing, care of mouth/teeth, toileting, grooming, dressing, etc.)  - Assess/evaluate cause of self-care deficits   - Assess range of motion  - Assess patient's mobility; develop plan if impaired  - Assess patient's need for assistive devices and provide as appropriate  - Encourage maximum independence but intervene and supervise when necessary  - Involve family in performance of ADLs  - Assess for home care needs following discharge   - Consider OT consult to assist with ADL evaluation and planning for discharge  - Provide patient education as appropriate  Outcome: Progressing  Goal: Maintains/Returns to pre admission functional level  Description: INTERVENTIONS:  - Perform BMAT or MOVE assessment daily.   - Set and communicate daily mobility goal to care team and patient/family/caregiver. - Collaborate with rehabilitation services on mobility goals if consulted  - Perform Rangees a day. - Reposition patieurs.   - Dangle patie  - Stand patien  - Ambulate   - Out of bed to c  - Out of bed for me  - Out of bed for toileting  - Record patient progress and toleration of activity level   Outcome: Progressing     Problem: DISCHARGE PLANNING  Goal: Discharge to home or other facility with appropriate resources  Description: INTERVENTIONS:  - Identify barriers to discharge w/patient and caregiver  - Arrange for needed discharge resources and transportation as appropriate  - Identify discharge learning needs (meds, wound care, etc.)  - Arrange for interpretive services to assist at discharge as needed  - Refer to Case Management Department for coordinating discharge planning if the patient needs post-hospital services based on physician/advanced practitioner order or complex needs related to functional status, cognitive ability, or social support system  Outcome: Progressing     Problem: Prexisting or High Potential for Compromised Skin Integrity  Goal: Skin integrity is maintained or improved  Description: INTERVENTIONS:  - Identify patients at risk for skin breakdown  - Assess and monitor skin integrity  - Assess and monitor nutrition and hydration status  - Monitor labs   - Assess for incontinence   - Turn and reposition patient  - Assist with mobility/ambulation  - Relieve pressure over bony prominences  - Avoid friction and shearing  - Provide appropriate hygiene as needed including keeping skin clean and dry  - Evaluate need for skin moisturizer/barrier cream  - Collaborate with interdisciplinary team   - Patient/family teaching  - Consider wound care consult   Outcome: Progressing     Problem: Nutrition/Hydration-ADULT  Goal: Nutrient/Hydration intake appropriate for improving, restoring or maintaining nutritional needs  Description: Monitor and assess patient's nutrition/hydration status for malnutrition. Collaborate with interdisciplinary team and initiate plan and interventions as ordered. Monitor patient's weight and dietary intake as ordered or per policy. Utilize nutrition screening tool and intervene as necessary. Determine patient's food preferences and provide high-protein, high-caloric foods as appropriate.      INTERVENTIONS:  - Monitor oral intake, urinary output, labs, and treatment plans  - Assess nutrition and hydration status and recommend course of action  - Evaluate amount of meals eaten  - Assist patient with eating if necessary   - Allow adequate time for meals  - Recommend/ encourage appropriate diets, oral nutritional supplements, and vitamin/mineral supplements  - Order, calculate, and assess calorie counts as needed  - Recommend, monitor, and adjust tube feedings and TPN/PPN based on assessed needs  - Assess need for intravenous fluids  - Provide specific nutrition/hydration education as appropriate  - Include patient/family/caregiver in decisions related to nutrition  Outcome: Progressing     Problem: MOBILITY - ADULT  Goal: Maintain or return to baseline ADL function  Description: INTERVENTIONS:  -  Assess patient's ability to carry out ADLs; assess patient's baseline for ADL function and identify physical deficits which impact ability to perform ADLs (bathing, care of mouth/teeth, toileting, grooming, dressing, etc.)  - Assess/evaluate cause of self-care deficits   - Assess range of motion  - Assess patient's mobility; develop plan if impaired  - Assess patient's need for assistive devices and provide as appropriate  - Encourage maximum independence but intervene and supervise when necessary  - Involve family in performance of ADLs  - Assess for home care needs following discharge   - Consider OT consult to assist with ADL evaluation and planning for discharge  - Provide patient education as appropriate  Outcome: Progressing  Goal: Maintains/Returns to pre admission functional level  Description: INTERVENTIONS:  - Perform BMAT or MOVE assessment daily.   - Set and communicate daily mobility goal to care team and patient/family/caregiver. - Collaborate with rehabilitation services on mobility goals if consulted  - Perform Range of a day. - Reposition pars.   - Dangle pat  - Stand patien  - Ambulate patie  - Out of bed to radha  -  - Out of bed for toileting  - Record patient progress and toleration of activity level   Outcome: Progressing     Problem: Nutrition/Hydration-ADULT  Goal: Nutrient/Hydration intake appropriate for improving, restoring or maintaining nutritional needs  Description: Monitor and assess patient's nutrition/hydration status for malnutrition. Collaborate with interdisciplinary team and initiate plan and interventions as ordered. Monitor patient's weight and dietary intake as ordered or per policy. Utilize nutrition screening tool and intervene as necessary. Determine patient's food preferences and provide high-protein, high-caloric foods as appropriate.      INTERVENTIONS:  - Monitor oral intake, urinary output, labs, and treatment plans  - Assess nutrition and hydration status and recommend course of action  - Evaluate amount of meals eaten  - Assist patient with eating if necessary   - Allow adequate time for meals  - Recommend/ encourage appropriate diets, oral nutritional supplements, and vitamin/mineral supplements  - Order, calculate, and assess calorie counts as needed  - Recommend, monitor, and adjust tube feedings and TPN/PPN based on assessed needs  - Assess need for intravenous fluids  - Provide specific nutrition/hydration education as appropriate  - Include patient/family/caregiver in decisions related to nutrition  Outcome: Progressing

## 2023-07-18 NOTE — PROGRESS NOTES
07/18/23 0700   Pain Assessment   Pain Assessment Tool 0-10   Pain Score No Pain   Restrictions/Precautions   Precautions Aspiration;Bed/chair alarms;Cognitive; Fall Risk;Impulsive;Supervision on toilet/commode;Visual deficit   Weight Bearing Restrictions No   ROM Restrictions No   Lifestyle   Autonomy "I didn't get comfortable last night"   Eating   Comment had breakfast with SLP this date   Oral Hygiene   Comment pt requesting to complete oral care post breakfast   Shower/Bathe Self   Type of Assistance Needed Incidental touching;Verbal cues   Physical Assistance Level No physical assistance   Comment pt engages in full shower routine seated and standing with pt able to bathe 10/10 parts, inc time and VCs for thoroughness of RUE and CG when in stance for balance when bathing rear. Shower/Bathe Self CARE Score 4   Upper Body Dressing   Type of Assistance Needed Physical assistance;Verbal cues   Physical Assistance Level 51%-75%   Comment seated with mod/max VCs for hernandez dressing tech to don camisole and shirt, VCs for orientation of shirt due to visual deficits.    Upper Body Dressing CARE Score 2   Lower Body Dressing   Type of Assistance Needed Physical assistance;Verbal cues   Physical Assistance Level 26%-50%   Comment assist to thread LLE despite max VCs; CG in stance for CM with max VCs for ensuring clothing fully up over L hip   Lower Body Dressing CARE Score 3   Putting On/Taking Off Footwear   Type of Assistance Needed Physical assistance   Physical Assistance Level 51%-75%   Comment pt able to doff socks with R hand, requires assist to don socks due to ongoing L hand weakness/impaired vision   Putting On/Taking Off Footwear CARE Score 2   Sit to Stand   Type of Assistance Needed Supervision   Physical Assistance Level No physical assistance   Comment RW   Sit to Stand CARE Score 4   Bed-Chair Transfer   Type of Assistance Needed Supervision   Physical Assistance Level No physical assistance   Comment RW   Chair/Bed-to-Chair Transfer CARE Score 4   Toileting Hygiene   Type of Assistance Needed Physical assistance;Verbal cues   Physical Assistance Level 25% or less   Comment hygiene completed seated on toilet; CM in stance with CG and max VCs/TCs, slight assist for CM over L hip   Toileting Hygiene CARE Score 3   Toilet Transfer   Type of Assistance Needed Supervision   Physical Assistance Level No physical assistance   Comment RW to standard toilet   Toilet Transfer CARE Score 4   Cognition   Overall Cognitive Status Impaired   Arousal/Participation Alert; Cooperative   Attention Attends with cues to redirect   Orientation Level Oriented X4   Memory Decreased short term memory;Decreased recall of recent events;Decreased recall of precautions   Following Commands Follows one step commands with increased time or repetition   Activity Tolerance   Activity Tolerance Patient tolerated treatment well   Assessment   Treatment Assessment pt engages in 90 minute skilled OT Session focusing on ADL routine, func transfers with RW. see above for full func details. pt continues to demo slow progress toward OT goals progressing to S for basic transfers, ongoing assist for self care tasks due to ongoing L sided weakness and visual deficits. recommend continued skilled care to focus on ADL retraining, func transfers, standing karishma/bal, func cog/safety/vision, LUE NMR, in order to decrease burden of care at d/c. Prognosis Fair   Problem List Decreased strength;Decreased range of motion;Decreased endurance; Impaired balance;Decreased mobility; Decreased coordination;Decreased cognition; Impaired judgement;Decreased safety awareness;Pain;Orthopedic restrictions   Barriers to Discharge Inaccessible home environment;Decreased caregiver support   Plan   Treatment/Interventions ADL retraining;Functional transfer training; Therapeutic exercise; Endurance training;Cognitive reorientation;Patient/family training;Equipment eval/education; Compensatory technique education   OT Therapy Minutes   OT Time In 0700   OT Time Out 0830   OT Total Time (minutes) 90   OT Mode of treatment - Individual (minutes) 90   OT Mode of treatment - Concurrent (minutes) 0   OT Mode of treatment - Group (minutes) 0   OT Mode of treatment - Co-treat (minutes) 0   OT Mode of Treatment - Total time(minutes) 90 minutes   OT Cumulative Minutes 848   Therapy Time missed   Time missed?  No

## 2023-07-18 NOTE — PLAN OF CARE
Problem: PAIN - ADULT  Goal: Verbalizes/displays adequate comfort level or baseline comfort level  Description: Interventions:  - Encourage patient to monitor pain and request assistance  - Assess pain using appropriate pain scale  - Administer analgesics based on type and severity of pain and evaluate response  - Implement non-pharmacological measures as appropriate and evaluate response  - Consider cultural and social influences on pain and pain management  - Notify physician/advanced practitioner if interventions unsuccessful or patient reports new pain  Outcome: Progressing     Problem: INFECTION - ADULT  Goal: Absence or prevention of progression during hospitalization  Description: INTERVENTIONS:  - Assess and monitor for signs and symptoms of infection  - Monitor lab/diagnostic results  - Monitor all insertion sites, i.e. indwelling lines, tubes, and drains  - Monitor endotracheal if appropriate and nasal secretions for changes in amount and color  - [place] appropriate cooling/warming therapies per order  - Administer medications as ordered  - Instruct and encourage patient and family to use good hand hygiene technique  - Identify and instruct in appropriate isolation precautions for identified infection/condition  Outcome: Progressing  Goal: Absence of fever/infection during neutropenic period  Description: INTERVENTIONS:  - Monitor WBC    Outcome: Progressing     Problem: SAFETY ADULT  Goal: Patient will remain free of falls  Description: INTERVENTIONS:  - Educate patient/family on patient safety including physical limitations  - Instruct patient to call for assistance with activity   - Consult OT/PT to assist with strengthening/mobility   - Keep Call bell within reach  - Keep bed low and locked with side rails adjusted as appropriate  - Keep care items and personal belongings within reach  - Initiate and maintain comfort rounds  - Make Fall Risk Sign visible to staff  - Offer Toileting every 2 Hours, in advance of need  - Initiate/Maintain bed/chair alarm  - Obtain necessary fall risk management equipment: bed/chair alarm  - Apply yellow socks and bracelet for high fall risk patients  - Consider moving patient to room near nurses station  Outcome: Progressing  Goal: Maintain or return to baseline ADL function  Description: INTERVENTIONS:  -  Assess patient's ability to carry out ADLs; assess patient's baseline for ADL function and identify physical deficits which impact ability to perform ADLs (bathing, care of mouth/teeth, toileting, grooming, dressing, etc.)  - Assess/evaluate cause of self-care deficits   - Assess range of motion  - Assess patient's mobility; develop plan if impaired  - Assess patient's need for assistive devices and provide as appropriate  - Encourage maximum independence but intervene and supervise when necessary  - Involve family in performance of ADLs  - Assess for home care needs following discharge   - Consider OT consult to assist with ADL evaluation and planning for discharge  - Provide patient education as appropriate  Outcome: Progressing  Goal: Maintains/Returns to pre admission functional level  Description: INTERVENTIONS:  - Perform BMAT or MOVE assessment daily.   - Set and communicate daily mobility goal to care team and patient/family/caregiver. - Collaborate with rehabilitation services on mobility goals if consulted  - Perform Range of Motion 3 times a day. - Reposition patient every 2 hours.   - Dangle patient 3 times a day  - Stand patient 3 times a day  - Ambulate patient 3 times a day  - Out of bed to chair 3 times a day   - Out of bed for meals 3 times a day  - Out of bed for toileting  - Record patient progress and toleration of activity level   Outcome: Progressing     Problem: DISCHARGE PLANNING  Goal: Discharge to home or other facility with appropriate resources  Description: INTERVENTIONS:  - Identify barriers to discharge w/patient and caregiver  - Arrange for needed discharge resources and transportation as appropriate  - Identify discharge learning needs (meds, wound care, etc.)  - Arrange for interpretive services to assist at discharge as needed  - Refer to Case Management Department for coordinating discharge planning if the patient needs post-hospital services based on physician/advanced practitioner order or complex needs related to functional status, cognitive ability, or social support system  Outcome: Progressing     Problem: Prexisting or High Potential for Compromised Skin Integrity  Goal: Skin integrity is maintained or improved  Description: INTERVENTIONS:  - Identify patients at risk for skin breakdown  - Assess and monitor skin integrity  - Assess and monitor nutrition and hydration status  - Monitor labs   - Assess for incontinence   - Turn and reposition patient  - Assist with mobility/ambulation  - Relieve pressure over bony prominences  - Avoid friction and shearing  - Provide appropriate hygiene as needed including keeping skin clean and dry  - Evaluate need for skin moisturizer/barrier cream  - Collaborate with interdisciplinary team   - Patient/family teaching  - Consider wound care consult   Outcome: Progressing     Problem: Nutrition/Hydration-ADULT  Goal: Nutrient/Hydration intake appropriate for improving, restoring or maintaining nutritional needs  Description: Monitor and assess patient's nutrition/hydration status for malnutrition. Collaborate with interdisciplinary team and initiate plan and interventions as ordered. Monitor patient's weight and dietary intake as ordered or per policy. Utilize nutrition screening tool and intervene as necessary. Determine patient's food preferences and provide high-protein, high-caloric foods as appropriate.      INTERVENTIONS:  - Monitor oral intake, urinary output, labs, and treatment plans  - Assess nutrition and hydration status and recommend course of action  - Evaluate amount of meals eaten  - Assist patient with eating if necessary   - Allow adequate time for meals  - Recommend/ encourage appropriate diets, oral nutritional supplements, and vitamin/mineral supplements  - Order, calculate, and assess calorie counts as needed  - Recommend, monitor, and adjust tube feedings and TPN/PPN based on assessed needs  - Assess need for intravenous fluids  - Provide specific nutrition/hydration education as appropriate  - Include patient/family/caregiver in decisions related to nutrition  Outcome: Progressing     Problem: MOBILITY - ADULT  Goal: Maintain or return to baseline ADL function  Description: INTERVENTIONS:  -  Assess patient's ability to carry out ADLs; assess patient's baseline for ADL function and identify physical deficits which impact ability to perform ADLs (bathing, care of mouth/teeth, toileting, grooming, dressing, etc.)  - Assess/evaluate cause of self-care deficits   - Assess range of motion  - Assess patient's mobility; develop plan if impaired  - Assess patient's need for assistive devices and provide as appropriate  - Encourage maximum independence but intervene and supervise when necessary  - Involve family in performance of ADLs  - Assess for home care needs following discharge   - Consider OT consult to assist with ADL evaluation and planning for discharge  - Provide patient education as appropriate  Outcome: Progressing  Goal: Maintains/Returns to pre admission functional level  Description: INTERVENTIONS:  - Perform BMAT or MOVE assessment daily.   - Set and communicate daily mobility goal to care team and patient/family/caregiver. - Collaborate with rehabilitation services on mobility goals if consulted  - Perform Range of Motion 3 times a day. - Reposition patient every 2 hours.   - Dangle patient 3 times a day  - Stand patient 3 times a day  - Ambulate patient 3 times a day  - Out of bed to chair 3 times a day   - Out of bed for meals 3 times a day  - Out of bed for toileting  - Record patient progress and toleration of activity level   Outcome: Progressing     Problem: Nutrition/Hydration-ADULT  Goal: Nutrient/Hydration intake appropriate for improving, restoring or maintaining nutritional needs  Description: Monitor and assess patient's nutrition/hydration status for malnutrition. Collaborate with interdisciplinary team and initiate plan and interventions as ordered. Monitor patient's weight and dietary intake as ordered or per policy. Utilize nutrition screening tool and intervene as necessary. Determine patient's food preferences and provide high-protein, high-caloric foods as appropriate.      INTERVENTIONS:  - Monitor oral intake, urinary output, labs, and treatment plans  - Assess nutrition and hydration status and recommend course of action  - Evaluate amount of meals eaten  - Assist patient with eating if necessary   - Allow adequate time for meals  - Recommend/ encourage appropriate diets, oral nutritional supplements, and vitamin/mineral supplements  - Order, calculate, and assess calorie counts as needed  - Recommend, monitor, and adjust tube feedings and TPN/PPN based on assessed needs  - Assess need for intravenous fluids  - Provide specific nutrition/hydration education as appropriate  - Include patient/family/caregiver in decisions related to nutrition  Outcome: Progressing

## 2023-07-18 NOTE — PLAN OF CARE
Problem: Nutrition/Hydration-ADULT  Goal: Nutrient/Hydration intake appropriate for improving, restoring or maintaining nutritional needs  Description: Monitor and assess patient's nutrition/hydration status for malnutrition. Collaborate with interdisciplinary team and initiate plan and interventions as ordered. Monitor patient's weight and dietary intake as ordered or per policy. Utilize nutrition screening tool and intervene as necessary. Determine patient's food preferences and provide high-protein, high-caloric foods as appropriate.      INTERVENTIONS:  - Monitor oral intake, urinary output, labs, and treatment plans  - Assess nutrition and hydration status and recommend course of action  - Evaluate amount of meals eaten  - Assist patient with eating if necessary   - Allow adequate time for meals  - Recommend/ encourage appropriate diets, oral nutritional supplements, and vitamin/mineral supplements  - Order, calculate, and assess calorie counts as needed  - Recommend, monitor, and adjust tube feedings and TPN/PPN based on assessed needs  - Assess need for intravenous fluids  - Provide specific nutrition/hydration education as appropriate  - Include patient/family/caregiver in decisions related to nutrition  7/18/2023 1816 by Bhavesh Crawford RD  Outcome: Progressing  7/18/2023 1816 by Bhavesh Crawford RD  Outcome: Progressing

## 2023-07-18 NOTE — MALNUTRITION/BMI
This medical record reflects one or more clinical indicators suggestive of malnutrition     Malnutrition Findings:   Adult Malnutrition type: Chronic illness  Adult Degree of Malnutrition: Other severe protein calorie malnutrition  Malnutrition Characteristics: Inadequate energy, Muscle loss, Fat loss, Weight loss                  360 Statement: severe protein calorie malnutrition related to disease/condition evidenced by <75% energy intake versus estimated needs for > 1 month causing significant weight loss (31# - 21%) over past 10 months since 9/4/22; Severe buccal fat pads loss, severe clavicle region muscle loss. Treating with Dysphagia diet  Nectar thick liquids and po supplements Boost High Protein pudding, and Magic Cup TID. BMI Findings: Body mass index is 20.78 kg/m². See Nutrition note dated 07/18/2023   for additional details. Completed nutrition assessment is viewable in the nutrition documentation.

## 2023-07-18 NOTE — PROGRESS NOTES
07/18/23 0830   Pain Assessment   Pain Assessment Tool 0-10   Pain Score No Pain   Swallow Assessment   Swallow Treatment Assessment Daily Dysphagia Tx Note      Patient Name: Yaz Herrera     [id number] Date: 7/18/2023        Current Risks for Dysphagia & Aspiration: Weak voicing; weak cough; dysarthria; general debilitation; new neuro event; brain injury; cognitive deficit; hx neurologic dx; positioning issues     Current Symptoms/Concerns: difficulty chewing; decreased oral intake; hx of dysphagia/aspiration; throat clearing; cough with intake     Current diet:puree/level 1 diet and nectar thick liquids      Premorbid diet::puree/level 1 diet and nectar thick liquids      Positioning: upright in wheelchair     Items administered:Consistencies Administered: puree, chopped/mechanically altered, soft solids, and thins  Materials administered included puree eggs, diced pears, chopped sausage, and thins by cup     Total amount of meal consumed:   75%  150cc by cup sips        Oral stage:mild to moderate  Lip closure: reduced for tsp, complete for thins  Anterior spillage: x1 puree  Mastication: moderately prolonged for soft solids and chopped  Bolus formation: decreased   Bolus control: suspect reduced given throat clear, weakness, reduced awareness  Transfer: delayed with chopped, more timely with liquids  Oral residue: none  Pocketing: none        Pharyngeal stage:mild to moderate  Swallow promptness: moderately delayed given larger boluses given large bites, more prompt but remains delayed when given verbal cue for smaller bites  Hyolaryngeal elevation: present but reduced for thins  Wet voice: inconsistently t/o meal  Throat clear: x2 w/ puree  Cough: x1 w soft (talked to RN with food in mouth), x2 w thins  Secondary swallows: piecemeal transfers with soft  Audible swallows: present across consistencies     Esophageal stage:  Belching x4  hx GI s/s (see previous notes)        Summary:     Pt presenting with mild to moderate oral and pharyngeal dysphagia this session. Symptoms or concerns included slow manipulation of soft and chopped, reduced bolus formation, suspected reduced bolus control, slow and at times labored transfers, suspected pharyngeal swallow delay, suspected reduced hyolaryngeal elevation, secondary swallows, audible swallows as well as GI s/s present. Pt assessed for lunch meal with trial tray of level 2 and thins. SLP assisted with tray set up, pt self-fed. Prior to initiating, SLP reviewed swallow strategies. Pt overall with similar oral and pharyngeal performance given upgraded diet items as compared to puree/level 1 diet. She continues with slow oral processing and management of bolus, as well as delayed oral transfer and swallow promptness. She is observed to cough and throat clear during meal across items, including with thins. She does present with functional HLE for food items but reduced for thins. Audible swallows persist, as well as esophageal and GI sxs which is known for this pt given her medical history. Observed to have strong carryover of alternating bites and sips strategy as she demonstrated use t/o meal independently. Recommendations:  Diet: puree/level 1 diet and nectar thick liquids  Meds: whole or cut with puree  Strategies: upright posture, only feed when fully alert, slow rate of feeding, small bites/sips, effortful swallow, quiet environment (tv off, limit talking, door closed, etc.) and alternating bites and sips     FULL supervision w/ meals. Results reviewed with: patient, Claudia Randolph RN  Aspiration precautions posted. F/u ST tx: Pt is currently recommended for further skilled SLP services targeting dysphagia therapy in order to maximize oral and pharyngeal swallow skills, while safely supporting PO intake, as well as to improve independent carryover of safe swallow strategies.       Plan: Cont to monitor pt on current diet              Trial upgraded textures of dysphagia level 2 and thin liquids as appropriate              Will benefit from f/u with GI team which pt follows with [facility] given esophageal concerns. Cont education on puree diet and NTL with strategies for at home (education folder started in room with handouts)   SLP Therapy Minutes   SLP Time In 0830   SLP Time Out 0915   SLP Total Time (minutes) 45   SLP Mode of treatment - Individual (minutes) 45   SLP Mode of treatment - Concurrent (minutes) 0   SLP Mode of treatment - Group (minutes) 0   SLP Mode of treatment - Co-treat (minutes) 0   SLP Mode of Treatment - Total time(minutes) 45 minutes   SLP Cumulative Minutes 405   Therapy Time missed   Time missed?  No

## 2023-07-18 NOTE — PROGRESS NOTES
PM&R PROGRESS NOTE:  Valeria García 70 y.o. female MRN: [id number]  Unit/Bed#: -01 Encounter: [id number]    Rehabilitation Diagnosis: Impairment of mobility, safety, Activities of Daily Living (ADLs), and cognitive/communication skills due to Other Disabling Impairments:  13  Other Disabling Impairments    HPI: Valeria García is a 70 y.o. female with medical history of HTN, CVA in 2/2023 on eliquis (chronic dysphagia, L sided weakness, cognitive impairment), hypercalcemia,  CHF, GERD, SHAR, gout, bipolar disorder, RA on plaquenil, chronic opioid dependence on morphine for chronic back/joint pain, who presented to the [address] [place] [address] [address] on 6/30 after a fall, unable to get up and disoriented. She thinks she tripped and fell down her porch steps, landing on he L thigh. Found to have a L lateral thigh hematoma with evidence of extravasation on imaging. Binder placed over hips, and eliquis was held but not reversed given her recent stroke. Speech consulted and noted oropharyngeal swallow likely at baseline and recommended Level 1/NTL. Recommended for VBS. Course c/b ABLA on 7/2 Hgb dropped to 6.8 and she was given 1 unit pRBC. Geriatrics was consulted, noted at baseline she is oriented but forgetful, with worsening issues since her stroke. Mini-Cog was 9/15 in 2021. They recommended checking B12. They noted she has not been on lorazepam since January, and also recommended weaning down off gabapentin if possible. On 7/4 patient had an unwitnessed fall while trying to reach for her bag on the window sill. No head strike, but landed on her L hip. GCS 15, Neuro exam was baseline. CTA ordered to look for active bleeding, which showed decreased size of hematoma with no active extravasation. Hgb did drop after her transfusion, but seems to have stablized today at 8.5.  That being said, her HR was elevated today, and it appears a troponin has been ordered by the trauma team as well as a CXR. CXR showed hazy RLL opacity, likely 2/2 atelectasis, and trops trended down. Metoprolol was increased with improvement. She was admitted to the [facility] on 7/7. SUBJECTIVE: Patient seen face to face. No acute issues overnight. Slept well, chronic pain ongoing, 6/10, discussed pain management plan with patient. Review recent lab results. Discussed planned esophagram scheduled for tomorrow, NPO after midnight. Voiding, denies dysuria, LBM 7/17 s/p Miralax. Denies chest pain, shortness of breath, fever, chills, N/V, abdominal pain. ASSESSMENT: Stable, progressing    PLAN:  - constipation- LBM 7/17 s/p Miralax  - lower extremity edema- stable, continue elevation  - anemia: Hbg 9.9, continue to monitor with labs  - pain management: patient does not ask for pain medication, discussed with patient home regimen and regimen on ARC. Patient states she understands and will ask for analgesics before pain is unbearable  - esophagram 7/19, NPO after midnight    Rehabilitation  • Functional deficits:  cognitive impairment, self-care, impaired mobility  • Continue current rehabilitation plan of care to maximize function.    • Functional update:   o PT: mobility- incidental touching, transfers- incidental touching, ambulation- close supervision '    o OT: ADL: bathing- incidental touching, dressing- UB mod-max A LB min-mod A, footwear- min-mod A, toileting- min-mod A  o SLP: aspiration precautions- dysphagia- oral stage-moderately impaired, pharyngeal stage- moderate impaired, Level 1/NTL   • Estimated Discharge: anticipated 7/21 with home PT/OT    Pain  • Tylenol 975 mg every 8 hours  • Lidocaine patch  • Gabapentin 400 mg BID  • Diclofenac Sodium topical gel QID prn  • Oxycodone 2.5-5 mg every 4 hours prn    DVT prophylaxis  • Eliquis    Bladder plan  • Continent    Bowel plan  • Continent      * Leg hematoma, left, initial encounter  Assessment & Plan  2/2 fall    6/30 CT CAP:  Large hematoma in the lateral left thigh measuring up to 9.6 cm with focus of active contrast extravasation. No acute intrathoracic or intra-abdominal injury. 7/4 CTA Pelvis:  1. Decreasing size of left lateral thigh hematoma without evidence for active extravasation. 2.  No CT findings in the visualized pelvis and thighs to explain falling hematocrit. 3.  Pancolitis with increasing inflammatory free fluid in the pelvis. No free air, pneumatosis, or encapsulated collections demonstrated. Non-operatively managed. Did require transfusion x1  Pain management as below  Monitor Hgb. PT/OT 3-5 hours/day, 5-7 days/week  Outpatient f/u with General Surgery      History of stroke  Assessment & Plan  MRI brain 2/22 - 1.  New small acute /more recent interval lacunar infarction in the right cerebral peduncle (series 4, image 14). 2.  Extensive multifocal large, previously present right middle cerebral artery infarction which is evolving with superimposed areas of evolving hemorrhage likely related to hemorrhagic transformation of subacute right MCA infarction.  No significant   mass effect. 3.  Mild, chronic microangiopathy. S/P thrombectomy 2/12 by Dr Myers Petros   Secondary stroke prophylaxis at this time with Eliquis 5 mg twice daily   PT, OT, SLP.    Review modifiable risk factors and provide stroke education  Monitor for poststroke pain  Eliquis for secondary stroke prophylaxis    Outpatient f/u with Neurology    CHF (congestive heart failure) ([address])  Assessment & Plan  Wt Readings from Last 3 Encounters:   07/18/23 53.2 kg (117 lb 4.8 oz)   06/30/23 55.3 kg (122 lb)   06/06/23 47.9 kg (105 lb 9.6 oz)     2/2023 Echo 30% with G2DD, moderate AI  Home: Toprol XL 25mg BID, Aldactone 25mg daily   - Limited by BP - no ACE/ARB   - no SGLT2i due to recurrent UTI  Here: Toprol XL 25mg BID, spironolactone 25mg daily   Unclear etiology of cardiomyopathy  Possible consideration for ischemic work-up outpatient, poor candidate for aggressive procedures   Monitor I/O/daily weights, volume status, lytes. IM consulted to assist with management  Outpatient f/u with Dr. Jacqueline Rodriguez      Dysphagia  Assessment & Plan  2/2 previous CVA earlier this year. Noted to have impaired oropharyngeal swallow  Was on free water protocol at home - holding for now due to thrush  On Level 1/NTL  - Inpatient SLP recommended VBS  - Consulting SLP here  - Aspiration precautions  - Strict oral hygiene prior to trials    Urinary tract infection without hematuria  Assessment & Plan  Present on admission  With incontinence and confusion  Grew 100k CFU Enterococcus    - Started on Macrobid based on antibiogram on 7/9   - sensitivity confirms susceptibility  Macrobid complete, monitor for signs of reoccurrence     Mixed stress and urge urinary incontinence  Assessment & Plan  Frankly incontinent here  Initiate timed voids and scans  Has history of recurrent UTIs - will check UA on admission here given extent of incontinence   - Had confusion on 7/8-7/9 overnight. - See UTI for details. Close continence care      Anemia  Assessment & Plan  2/2 L leg hematoma (most recent imaging negative for extravasation)  At home on eliquis  No reversal inpatient due to recent CVA  Prior to admission Hgb 10-12. Ravi 7/2 6.8 - given 1 unit pRBC  7/8 Hgb 8.5 > 9.2 > 9.3 > 10.1 > 9.9  Monitor, transfuse as appropriate. IM consulted to assist with management    Cervical dystonia  Assessment & Plan  Recommend outpatient f/u with Dr. Amelia Samuel to evaluate for botulinum toxin injections  Appears to have a retrocollis. Sundowning  Assessment & Plan  Does wake up confused at night, but is easily redirectable  - More confusion, than agitation  - Wandered all the way down to the cafeteria on the 1st floor on 7/9  - No injuries  - Brought back to her room  - UCx with 100k enterococcus. - treat UTI  - Low bed, Q15hr checks overnight, Sleep logs  - Floor mats.    - Delirium precautions    Night terrors  Assessment & Plan  Home: Prazosin  Here: none  Continue melatonin  Outpatient f/u with PCP    Thrush  Assessment & Plan  Noted on admission  7/7 started on nystatin swish/spit  - 7/11 improving  - Continue for at least 7 days with 2-3 days after her antibiotics stop    Hypomagnesemia  Assessment & Plan  Home: Mag oxide 800mg BID  Here: Mag oxide 400mg BID  7/9 Mag 1.9   Goals > 2. Sinus tachycardia  Assessment & Plan  Home: Toprol XL 25mg BID  Here: Same  On 7/6 with HR in 115-120. EKG showed sinus tach. Trops trended and negative. - Associated with transient/mild SOB   - She was only being given Toprol daily in the hospital   - Increased to BID with improvement  Lytes with K > 4, Mag > 2  Monitor and adjust regimen as appropriate  IM consulted to assist with management  Outpatient f/u with Dr. Lizzy Fabian    Obstructive sleep apnea  Assessment & Plan  Previously non-compliant with hospital or home CPAP  Outpatient f/u with PCP. Hypercalcemia  Assessment & Plan  Suspect 2/2 primary hyperparathyroidism  7/8 iCal 1.12, Phos 3.3  Home: Sensipar 30mg daily  Here: Same  Recommend outpatient f/u with Endocrinology      HTN (hypertension)  Assessment & Plan  Tends to run on the low/normal side. Home: Toprol XL 25mg BID, Spironolactone 25mg daily  Here: Same  Has not been able to get meds at times due to not meeting parameters. Monitor and adjust as appropriate  - Spironolactone held 7/15 and d/c 7/16  IM consulted to assist with management    Bipolar depression [facility])  Assessment & Plan  Home: Bupropion 100mg BID, Cymbalta 60mg BID, Ativan 0.25mg BID PRN (however this has not been filled since January 2023 as per PDMP)  Here: Bupropion 100mg BID, Cymbalta 60mg BID  Consulting rehab psychology for support  Adjust as appropriate  Outpatient f/u with PCP    Severe protein-calorie malnutrition ([address])  Assessment & Plan  Malnutrition Findings:                                 BMI Findings: Body mass index is 20.89 kg/m². Consulted nutrition    Osteoporosis  Assessment & Plan  Hx L1 compression fx s/p kyphoplasty  Was previously on Prolia - last given in 2020   - Held due to dental issues  Recommend outpatient f/u with Endocrinology  2/23 Vitamin D level is 41.7    Gastroesophageal reflux disease without esophagitis  Assessment & Plan  Home: PPI daily  Here: PPI daily    Seronegative arthropathy of multiple sites [facility])  Assessment & Plan  Follows with Dr. Faviola Burnett with Rheum and is on Hydroxychloroquine 200mg BID. Continue home regimen and follow-up with rheumatology on discharge    Mild intermittent asthma without complication  Assessment & Plan  No acute exacerbation  Home: Albuterol PRN  Here: Same    Chronic pain syndrome  Assessment & Plan  Joint and back pain  Follows with Dr. Yancy Casanova with [facility]  Reviewed PDMP - at home on MSIR 7.5mg BID PRN. Here: MSIR 7.5mg BID PRN  - Outpatient f/u with Dr. Yancy Casanova. Appreciate IM consultants medical co-management. Personally reviewed labs, medications, and imaging. ROS:  Review of Systems   A 10 point review of systems was negative except for what is noted in the HPI. OBJECTIVE:   /62 (BP Location: Left arm)   Pulse 87   Temp 97.9 °F (36.6 °C) (Axillary)   Resp 20   Ht 5' 3" (1.6 m)   Wt 53.2 kg (117 lb 4.8 oz)   SpO2 96%   BMI 20.78 kg/m²     Physical Exam  Constitutional:       Appearance: Normal appearance. HENT:      Head: Normocephalic and atraumatic. Nose: Nose normal.      Mouth/Throat:      Mouth: Mucous membranes are moist.   Cardiovascular:      Rate and Rhythm: Normal rate and regular rhythm. Pulses: Normal pulses. Heart sounds: Normal heart sounds. Pulmonary:      Effort: Pulmonary effort is normal.      Breath sounds: Normal breath sounds. Abdominal:      General: Bowel sounds are normal.      Palpations: Abdomen is soft. Musculoskeletal:         General: Normal range of motion. Cervical back: Normal range of motion. Skin:     General: Skin is warm and dry. Capillary Refill: Capillary refill takes less than 2 seconds. Findings: Bruising present. Neurological:      Mental Status: She is alert and oriented to person, place, and time.    Psychiatric:         Mood and Affect: Mood normal.         Judgment: Judgment normal.        Lab Results   Component Value Date    WBC 5.07 07/18/2023    HGB 9.9 (L) 07/18/2023    HCT 30.9 (L) 07/18/2023     (H) 07/18/2023     07/18/2023     Lab Results   Component Value Date    SODIUM 143 07/18/2023    K 4.3 07/18/2023     (H) 07/18/2023    CO2 30 07/18/2023    BUN 18 07/18/2023    CREATININE 0.84 07/18/2023    GLUC 82 07/18/2023    CALCIUM 8.9 07/18/2023     Lab Results   Component Value Date    INR 1.33 (H) 07/01/2023    INR 1.65 (H) 06/30/2023    INR 1.66 (H) 04/29/2023    PROTIME 16.7 (H) 07/01/2023    PROTIME 19.5 (H) 06/30/2023    PROTIME 19.9 (H) 04/29/2023           Current Facility-Administered Medications:   •  acetaminophen (TYLENOL) tablet 650 mg, 650 mg, Oral, Q6H [address], Stephanie Cifuentes MD, 650 mg at 07/18/23 0504  •  albuterol (PROVENTIL HFA,VENTOLIN HFA) inhaler 2 puff, 2 puff, Inhalation, Q6H PRN, Stephanie Cifuentes MD, 2 puff at 07/15/23 1603  •  apixaban (ELIQUIS) tablet 5 mg, 5 mg, Oral, BID, Stephanie Cifuentes MD, 5 mg at 07/17/23 1706  •  buPROPion [facility]) tablet 100 mg, 100 mg, Oral, BID, Stephanie Cifuentes MD, 100 mg at 07/17/23 1707  •  cinacalcet (SENSIPAR) tablet 30 mg, 30 mg, Oral, Daily With Breakfast, Stephanie Cifuentes MD, 30 mg at 07/18/23 4046  •  Diclofenac Sodium (VOLTAREN) 1 % topical gel 2 g, 2 g, Topical, 4x Daily PRN, Stephanie Cifuentes MD  •  DULoxetine (CYMBALTA) delayed release capsule 60 mg, 60 mg, Oral, BID, Stephanie Cifuentes MD, 60 mg at 07/17/23 1706  •  gabapentin (NEURONTIN) capsule 400 mg, 400 mg, Oral, BID, Stephanie Cifuentes MD, 400 mg at 07/17/23 1706  •  hydroxychloroquine (PLAQUENIL) tablet 200 mg, 200 mg, Oral, BID With Meals, Hilario Wilson MD, 200 mg at 07/18/23 6011  •  lamoTRIgine (LaMICtal) tablet 150 mg, 150 mg, Oral, HS, Hilario Wilson MD, 150 mg at 07/17/23 2133  •  lidocaine (LIDODERM) 5 % patch 1 patch, 1 patch, Topical, Daily, LAURA Rutledge, 1 patch at 07/17/23 0935  •  magnesium Oxide (MAG-OX) tablet 400 mg, 400 mg, Oral, BID, Hilario Wilson MD, 400 mg at 07/17/23 1706  •  melatonin tablet 3 mg, 3 mg, Oral, HS, Hilario Wilson MD, 3 mg at 07/17/23 2133  •  metoprolol succinate (TOPROL-XL) 24 hr tablet 25 mg, 25 mg, Oral, Daily, LAURA Harrison, 25 mg at 07/17/23 5438  •  morphine (MSIR) IR tablet 7.5 mg, 7.5 mg, Oral, BID PRN, LAURA Davis  •  ondansetron (ZOFRAN-ODT) dispersible tablet 4 mg, 4 mg, Oral, Q6H PRN, Hilario Wilson MD, 4 mg at 07/17/23 1935  •  pantoprazole (PROTONIX) EC tablet 40 mg, 40 mg, Oral, Early Morning, Hilario Wilson MD, 40 mg at 07/18/23 0504  •  polyethylene glycol (MIRALAX) packet 17 g, 17 g, Oral, Daily PRN, LAURA Rutledge, 17 g at 07/17/23 0934  •  simethicone (MYLICON) chewable tablet 80 mg, 80 mg, Oral, 4x Daily (with meals and at bedtime), LAURA Jean-Baptiste, 80 mg at 07/18/23 0901    Past Medical History:   Diagnosis Date   • Anxiety    • Asthma    • Bipolar depression ([address])    • Chronic narcotic dependence (HCC)    • Chronic pain    • Depression    • Enterovirus heart infection    • SHAR (obstructive sleep apnea)    • Osteoarthritis    • Peripheral neuropathy    • Plantar fasciitis of right foot    • Senile osteoporosis    • Seronegative arthropathy of multiple sites [facility])    • Undifferentiated connective tissue disease [facility])        Patient Active Problem List    Diagnosis Date Noted   • Leg hematoma, left, initial encounter 07/01/2023   • History of stroke 02/28/2023   • CHF (congestive heart failure) ([address])    • Dysphagia 03/09/2023   • Urinary tract infection without hematuria 03/06/2023   • Mixed stress and urge urinary incontinence    • Anemia 06/26/2021 • Cervical dystonia 07/11/2023   • Sundowning 07/09/2023   • Thrush 07/07/2023   • Night terrors 07/07/2023   • Cerebral aneurysm, nonruptured 05/15/2023   • Nocturnal hypoxia 05/01/2023   • Stroke-like symptoms 05/01/2023   • Insomnia 04/03/2023   • Hypomagnesemia 03/30/2023   • Sinus tachycardia 03/29/2023   • Hypokalemia 03/10/2023   • AMS (altered mental status) 03/09/2023   • Gout of foot 03/09/2023   • Wound of left ankle 03/09/2023   • Obstructive sleep apnea 02/28/2023   • History of bacteremia 02/24/2023   • Acute CVA (cerebrovascular accident) ([address]) 02/23/2023   • Hypercalcemia 02/22/2023   • Discoloration of skin of foot 02/22/2023   • Abdominal pain 02/22/2023   • HFrEF (heart failure with reduced ejection fraction) ([address]) 02/13/2023   • Stroke ([address]) 02/12/2023   • HTN (hypertension) 02/12/2023   • Cardiomyopathy ([address]) 02/07/2023   • Vitamin D insufficiency 02/07/2023   • LAILA (acute kidney injury) ([address]) 04/23/2022   • Polypharmacy 04/23/2022   • 'light-for-dates' infant with signs of fetal malnutrition 04/22/2022   • Bipolar depression ([address]) 03/04/2022   • Rheumatoid arthritis of multiple sites with negative rheumatoid factor ([address]) 03/04/2022   • Closed fracture of multiple ribs of right side 03/02/2022   • Traumatic pneumothorax 03/02/2022   • Closed fracture of transverse process of lumbar vertebra ([address]) 03/02/2022   • Liver contusion 03/02/2022   • Fall 03/02/2022   • Acute pain due to trauma 03/02/2022   • Right shoulder pain 03/02/2022   • Severe protein-calorie malnutrition ([address]) 03/02/2022   • Undifferentiated connective tissue disease ([address]) 12/29/2021   • Primary generalized (osteo)arthritis 12/29/2021   • Peripheral neuropathy 12/29/2021   • Osteoporosis 12/29/2021   • Hx of compression fracture of spine 12/29/2021   • Lumbar spondylosis 12/29/2021   • Cervical spondylosis 12/29/2021   • Chronic pain syndrome    • Mild intermittent asthma without complication    • Ambulatory dysfunction    • Seronegative arthropathy of multiple sites ([address] [facility])    • Gastroesophageal reflux disease without esophagitis         LAURA Loredo  Physical Medicine and [place]

## 2023-07-18 NOTE — DISCHARGE INSTR - AVS FIRST PAGE
Neurosurgery discharge instructions following spine fracture:     TLSO brace to be worn when out of bed of head of bed greater than 45 degrees. May place brace on while sitting on edge of bed. May be removed for showering. No bending, twisting or heavy lifting. No strenuous activities. No Driving. Follow-up as scheduled in two weeks with repeat spine x-rays to be completed 2-3 days prior to visit. Prescription has been entered electronically. **Please notify MD immediately if you have increased back or leg pain. New numbness, tingling, weakness in your legs and/or bowel/bladder incontinence**     DISCHARGE INSTRUCTIONS: [facility]    Bring these instructions with you to your Outpatient Physician appointments so they can order and follow-up any additional lab work or imaging recommended at time of discharge. You have been determined to have some cognitive impairments. - It is YOUR CAREGIVER'S RESPONSIBILITY to ensure appropriate follow-up which includes:  - APPOINTMENTS are scheduled and safe transportation is arranged  - LABS and IMAGING are completed  - MEDICATION MANAGEMENT at home is carried out appropriately     You remain a fall and injury risk which could be severe. - Your risk of fall has decreased however since admission to acute rehab. Caregiver training has been completed with our staff. - Appropriate supervision +/- assistance as instructed during your rehab course is recommended to decrease risk of fall and injury. - Continue skilled therapy as discussed after discharge to further decrease this risk    If you (or your health care proxy) have any questions or concerns regarding your acute rehabilitation stay including issues with medications, rehabilitation, and follow-up plan, please call:          [address] [place], [place] [address]e Unit in [facility] at [phone] or [phone].     Should you develop fevers, chills, new weakness, changes in sensation, difficulty speaking, facial weakness, confusion, shortness of breath, chest pain, or other concerning symptoms please call 911 and/or obtain transportation to nearest ER immediately. PHYSICIANS to see:  Please see your doctors listed in the follow up providers section of your discharge paperwork, and take the discharge paperwork with you to your appointments. Home health has been ordered for you: Your home health agency should reach out to you or your family soon to arrange follow-up. (If  [facility] health is your provider their phone number is [phone])    If you are unable to get in touch with home health you may contact your  at [phone]. LAB WORK recommended after discharge: Follow-up lab work at discretion of your outpatient physicians to be determined at time of your future appointments. Follow-up results with your PCP. This lab work can be drawn by your home health nurse. CBC to monitor hemoglobin at next visit within 1 week     Excessive delay in labs and appropriate follow-up could potentially increase your risk of complications which could be severe and even life-threatening. It is you or your caregiver's responsibility to ensure these tests are ordered by your outpatient providers and followed up with accordingly.     IMAGING to follow-up:  Please CALL [facility] Scheduling at [phone] to schedule:    Please have your back x-rays done 2-3 days prior to your 6 week post operative follow up visit with your neurosurgeon and bring the disk with the x-rays on them with you to the appointment; you have been provided an electronic prescription that can be used at [facility] radiology facilities however if you do not wish to use [facility] radiology services please contact [facility] for a paper prescription to use at a radiology facility of your choosing and bring the disk with the x-rays/images on them to your appointment     IMAGING, ADDITIONAL FINDINGS and ISSUES to follow-up:  Check under the "DISCHARGE PROVIDER" section of these DISCHARGE INSTRUCTIONS for provider contact information and specific issues as well. * Leg hematoma, left, initial encounter (Left Leg Bleed)   Assessment & Plan  Discharging home with home therapies  Recheck a CBC in 1 week to monitor your blood counts  Most recent images without evidence of active bleeding  Follow-up with General Surgery as instructed. Cervical dystonia (Abnormal neck position)  Assessment & Plan  Recommend outpatient f/u with Dr. Wilmar Parrish to evaluate for botulinum toxin injections    Sundowning  Assessment & Plan  Does wake up confused at night, but is easily redirectable  - Is tolerating melatonin here at night  - Recommend strategies as outlined by therapies  - Gentle redirection and reorientation typically works when this happens. Night terrors  Assessment & Plan   1. At home was on medication for this, but it dropped her blood pressure. 2. Currently being held. Follow-up with your outpatient provider. Hypomagnesemia (Low Magnesium levels)  Assessment & Plan  Take Magnesium as prescribed. Sinus tachycardia  Assessment & Plan  Continue taking Metoprolol as prescribed  Follow-up with Dr. Nelli Temple (Cardiology)    Dysphagia  Assessment & Plan  Continue strict oral hygiene  2. Continue Pureed and Nectar Thick Liquids  3. Continue working with speech therapy. Recurrent Urinary Tract Infections  Assessment & Plan  Finished two rounds of antibiotics in the hospital to manage this. Strongly recommend following up with Urogynecology at [facility] - referral provided at discharge. History of stroke  Assessment & Plan  MRI brain 2/22 - 1. New small acute /more recent interval lacunar infarction in the right cerebral peduncle (series 4, image 14).   2.  Extensive multifocal large, previously present right middle cerebral artery infarction which is evolving with superimposed areas of evolving hemorrhage likely related to hemorrhagic transformation of subacute right MCA infarction. No significant   mass effect. 3.  Mild, chronic microangiopathy. S/P thrombectomy 2/12 by Dr Ruby Lee   Secondary stroke prophylaxis at this time with Eliquis 5 mg twice daily    - Per previous notes, strong suspicion for cardioembolic origin   - Has not had Ziopatch/Loop recorder placed   - Will need f/u with Cardiology   - Unfortunately, Eliquis price check was > $500, Xarelto was > $500, Pradaxa  Was $203 for brand and $175 for generic.    - Plan will be to go home with Eliquis for 1 month free, and then after that month, family can decide if they want to do Pradaxa, or go with Warfarin.     - Anticoagulation to be managed by Neurology and Cardiology     Obstructive sleep apnea  Assessment & Plan  Previously non-compliant with hospital or home CPAP  Outpatient f/u with PCP. Hypercalcemia (High Calcium)  Assessment & Plan  Continue Sensipar. Suspected to be secondary to primary hyperparathyroidism. Recommend outpatient f/u with Endocrinology    Bipolar depression [facility])  Assessment & Plan  Home: Bupropion 100mg BID, Cymbalta 60mg BID, Lamictal, Ativan 0.25mg BID PRN (however this has not been filled since January 2023 as per PDMP)  Here: Bupropion 100mg BID, Cymbalta 60mg BID, Lamictal  Outpatient f/u with PCP    Acute pain of right shoulder, Left hip. Assessment & Plan  Has arthritis on imaging  During rehab stay, examined like mild impingement as well. - Therapy with OT. May benefit from injection to shoulder  intra-articular, but possibly subacromial if no improvement with therapies). Recommend outpatient f/u with Orthopedics. Provided referral at discharge. Osteoporosis  Assessment & Plan  Hx L1 compression fx s/p kyphoplasty  Has multiple compression fractures noted on Thoracic spine imaging   - And possibly new anterior wedging of T12 on most recent imaging.   Was previously on Prolia - last given in 2020   - Held due to dental issues  Recommend outpatient f/u with Endocrinology  2/23 Vitamin D level is 41.7    Anemia  Assessment & Plan  Has been stable. Recheck in 1 week with your primary care physician. CHF (congestive heart failure) ([address])  Assessment & Plan  Repeat echo performed here. Follow-up results with Dr. Lisa Briggs. Continue medications as prescribed. Mixed stress and urge urinary incontinence  Assessment & Plan  Frankly incontinent in the hospital  History of pubovaginal sling with [facility]  Initiate timed voids and scans   - PVRs have all been low   - Timed voids help significantly with continence. Has history of recurrent UTIs - see that entry for details  Close continence care  Recommend outpatient f/u with Urogynecology. Referral provided at discharge      Gastroesophageal reflux disease without esophagitis  Assessment & Plan  Home: PPI daily  Here: PPI daily    Seronegative arthropathy of multiple sites [facility])  Assessment & Plan  Follows with Dr. Argueta Adjutant with Rheum and is on Hydroxychloroquine 200mg BID. Continue home regimen and follow-up with rheumatology on discharge    Mild intermittent asthma without complication  Assessment & Plan  No acute exacerbation  Home: Albuterol PRN  Here: Same    Chronic pain syndrome  Assessment & Plan  Joint and back pain  Follows with Dr. Roberto Warner with [facility]  Reviewed PDMP - at home on MSIR 7.5mg TID PRN. Here: MSIR 7.5mg TID PRN - currently only using twice daily  - Outpatient f/u with Dr. Roberto Warner. - Reached out to his office. PDMP reviewed - last filled 6/30 prior to admission   - If she runs out sooner, family can call and they will refill   - They have follow-up arranged for 8/29, and put her on the wait list as well to be seen sooner if possible. Thrush-resolved as of 7/24/2023  Assessment & Plan  Noted on admission.  Now resolved  7/7 started on nystatin swish/spit  - 7/11 improving  - Continued for at least 7 days with 2-3 days after her antibiotics stop    New Compression fracture (has multiple old compression fractures, one might be new)  Assessment & Plan  - s/p slip fall onto buttocks 7/19  - did not ask for assistance  - Now with bed alarms and 1:1 after failing frequent checks.  - CTH, CT C-spine without acute findings, XR Hip/Pelvis without acute findings  - 2 days later with back pain and found to have possible new wedging of T12 - age indeterminate without fracture line  - Pain control as detailed below. - Neurosurgery recommend- thoracic spine precautions,TLSO brace, repeat XR of her spine prior to their follow-up visit in 1-2 weeks. WOUND CARE INSTRUCTIONS to follow:  Home health nursing will assist you with wound management. You may contact them with issues as well once this service is set-up. If [facility] is your provider their phone number is [phone]. If you are unable to get in touch with [facility] you may contact your  at [phone]. Due to the following you are at increased risk of skin breakdown/wounds/worsening wounds:  - Impaired mobility  - Impaired nutrition/intake/low albumin  - Medical co-morbidities    Buttocks/Sacrum  Turn as full as possible off sacrum/buttocks every 2 hours  Use Cushion while in chair or wheelchair  Weight shift every 10-15 minutes while in chair  Keep skin clean and dry as possible. Remove wet or soiled clothing/linens promptly  Use barrier cream or similar 2 times per day   Monitor skin for increased breakdown which you are at risk of and notify nursing, PCP, or other physician providers should this occur right away    Heels/bony edges of feet  Float heels off edge of pillow for added pressure relief.   Monitor heels and bony protuberances and notify physician or nursing for any increased redness, bogginess, or breakdown    WEIGHTBEARING/ACTIVITY PRECAUTIONS to follow:  24 hours/7 days per week supervision by caregiver required    Please wear your brace when out of bed and when seated upright until told otherwise by your surgeon. Driving restrictions: You are recommended against driving. Work restrictions: You should NOT return to work (if working) until cleared by an outpatient physician. You should not operate heavy machinery (if applicable) until cleared by an outpatient physician. Alcohol restrictions: You are recommended to not drink alcohol at this time unless cleared by an outpatient physician. Drinking alcohol in your current functional condition can increase your risk of injury which could be severe. Drinking alcohol given your current health problems can lead to increased medical complications which could be severe. Combining alcohol with your current medications can increase your risk of injury which could be severe. Smoking restrictions: You are recommended to not smoke nicotine. Smoking increases your risk of heart attack, stroke, emphysema/COPD, and lung cancer. MEDICATIONS:  Please see a full list of your medications outlined in the After Visit Summary that is attached to these Discharge Instructions. Please note changes may have been made to your medications please refer to your discharge paperwork for your current medications and take this list with you to all your doctors appointments for your doctors to review. Please do not resume a home medication unless the medication reconciliation sheet indicates to do so, please do not assume that a medication that you were given a prescription for is the same as a medication you have at home based on both medications having the same name as dosages and frequency may have changed.       Unless specifically noted in your medication list provided to you in your discharge paper work do not resume prior vitamins, minerals, or supplements you may have been taking prior to your hospitalization unless instructed by an outpatient physician in the future. Discuss with your primary care at next visit if applicable. Blood thinners prescribed to optimize long and short term health and decrease risk of complications but with risks related to bleeding and other complications. Eliquis (Apixiban) instructions: You have been prescribed apixiban(Eliquis). This medication is used to prevent clots which can cause severe disability and even death. This medication will need to be managed by your outpatient physician(s) after discharge. Follow-up with the appropriate provider as soon as possible to ensure appropriate use. This is a strong anticoagulant (blood thinner). It is being recommended for use by you (or your family) to decrease the risk of clotting which can be severely disabling and even life-threatening. Even when provided as recommended it can cause severe disabling and even life-threatening bleeding. Too much or too little of this blood thinner further increases your risk of this medication causing serious and even life-threatening complications such as severe bleeding, clots, strokes, and death. With that said, at this time based on available evidence and consensus agreement, your physicians recommend you take Eliquis (Nevaeh Erazo) medication based on your overall risks and benefits in your specific medical situation. If you (or your family/caregiver) notice black stools, bloody stools, vomit blood, develop new weakness, slurred speech, confusion or have any other concerning symptoms call 911 or obtain transportation to nearest emergency room immediately. Sedating Medications with increased risk of complications:    Prior to this hospitalization you were taking morphine as prescribed by your outpatient physicians for chronic pain. During this hospital course we have continued this medication. This medication will need to be managed by your pain physician after discharge.   Follow-up with this provider as soon as possible to ensure appropriate use. There are risks associated with opioid medications. They can increase your risk of falling, injury, and breathing difficulties which can be severe and even life-threatening. Note it is advisable to limit use of opiate pain medications as they can become habit forming and lead to addiction. Other potential side effects of the medication include, but are not limited to, constipation, drowsiness, impaired judgment and risk of fatal overdose if not taken as prescribed. You should not drive while taking this medication  The patient was warned against driving while taking sedation medications. Sharing medications is a felony. At this point in time, the patient is showing no signs of addiction, abuse, diversion or suicidal ideation. The patient (you/or relevant caregiver) understands and agrees to use this medication only as prescribed. Gabapentin has been used to help pain. You tolerated this medication adequately during your recent hospital stay. - Take 300 mg 2 times per day. - Do not stop this medication abruptly as this can cause seizures. - If stopping medication I would decrease by one dose every 3-5 days. - Do not take with alcohol (or marijuana/cannabis) while on this medication as this can cause increased confusion, breathing problems, falls, and severe injury. - This medication can increase risk of confusion, fall, and injury although you did tolerate adequately during rehab course. - Follow-up with your primary care physician within 1 week as well as your pain management physician for additional management of your medical conditions, potential refills, or adjustments of this medication.         MEDICAL MANAGEMENT AT HOME specific to you:    Hypertension Management:  Only take the medications prescribed for you at time of discharge - overly high or low blood pressure increases your risk for health complications    Follow-up with PCP/family doctor regularly to ensure blood pressure remains adequately controlled. Please check your blood pressure prior to taking your blood pressure medications and keep a log that you will bring with you to your follow-up doctors' appointments. >Please contact your family doctor or cardiologist immediately for a blood pressure below 100/50 and do not take your blood pressure medications until speaking with them. >Please contact your family doctor or cardiologist as soon as possible for blood pressure greater than 160/100. Heart Failure Management:  Follow-up management by your cardiologist.  Please set-up follow-up appointment as soon as possible after your discharge. Take your medications as prescribed at time of discharge unless changed by another physician in future. You will need to obtain follow-up lab work as an outpatient to monitor effectiveness and safety of these medications. Obtain follow-up lab orders from your outpatient providers at their discretion. You should weigh yourself every day or every other day and keep a log that you bring to your physicians office. Notify your physician if you start to gain more than 2-4 lbs in a few days. These recommendations are meant to prevent build up of excessive fluid inside you which can make it harder to breath. Follow-up with your outpatient physicians who may change these recommendations in the future. Notify your physicians should you develop increased swelling, weight gain, or shortness of breath. If you develop significant shortness of breath, chest pain, confusion, or other concerning signs or symptoms call 911 or obtain transportation to nearest emergency room right away. Cardiac Loop recorder (Implanted heart rhythm monitor) or Zio Patch (External heart monitor) RECOMMENDED:  You are recommended to have an implanted cardiac monitor (LOOP RECORDER) placed or Zio patch placed after discharge.   Contact cardiology as outlined to set this up as soon as possible after discharge. This monitor will be looking for abnormal heart rhythms over time that would put you at higher risk of stroke which could change the type of blood thinner you take. Anemia management:  Follow-up with primary care physician at next visit. Continue to monitor blood counts intermittently. Follow-up management at discretion of PCP.  >If you develop increased lightheadedness, shortness of breath, chest pain, confusion, difficulty staying awake, or other concerning signs or symptoms obtain transport to nearest emergency room as soon as possible. Acetaminophen (Tylenol) Dosing Warning: You may have up to 3000 mg of acetaminophen (Tylenol) from all sources spread out over a 24 hours period. Do not have more than that, as this can increase your risk of liver injury which can be serious. Please note a summary of your hospital stay with relevant information for your doctors will try to be sent to them. Please confirm with your doctors at your follow up visits that they have received this summary and have them contact [address] [facility] [address] if they have not received them along with any other medical records they may require.      Matt Phone Number:  [phone]

## 2023-07-18 NOTE — PROGRESS NOTES
07/18/23 1330   Pain Assessment   Pain Assessment Tool 0-10   Pain Score No Pain   Restrictions/Precautions   Precautions Bed/chair alarms;Aspiration;Cognitive; Fall Risk;Hard of hearing;Contact/isolation;Pain;Supervision on toilet/commode;Visual deficit   Weight Bearing Restrictions No   ROM Restrictions No   Cognition   Overall Cognitive Status Impaired   Arousal/Participation Alert; Cooperative   Attention Attends with cues to redirect   Orientation Level Oriented X4   Memory Decreased short term memory;Decreased recall of recent events;Decreased recall of precautions   Following Commands Follows one step commands with increased time or repetition   Lying to Sitting on Side of Bed   Type of Assistance Needed Supervision   Lying to Sitting on Side of Bed CARE Score 4   Sit to Stand   Type of Assistance Needed Supervision   Comment with RW   Sit to Stand CARE Score 4   Bed-Chair Transfer   Type of Assistance Needed Supervision; Adaptive equipment   Comment CS with RW   Chair/Bed-to-Chair Transfer CARE Score 4   Transfer Bed/Chair/Wheelchair   Adaptive Equipment Roller Walker   Walk 10 Feet   Type of Assistance Needed Supervision; Adaptive equipment   Comment with RW   Walk 10 Feet CARE Score 4   Walk 50 Feet with Two Turns   Type of Assistance Needed Supervision; Adaptive equipment   Comment CS with RW   Walk 50 Feet with Two Turns CARE Score 4   Walk 150 Feet   Type of Assistance Needed Supervision; Adaptive equipment   Comment CS with RW   Walk 150 Feet CARE Score 4   Ambulation   Primary Mode of Locomotion Prior to Admission Walk   Distance Walked (feet) 180 ft  (x2, 150' x2)   Assist Device Roller Walker   Gait Pattern Slow Amy;Decreased foot clearance; Forward Flexion;Narrow AYLA; Improper weight shift   Limitations Noted In Coordination; Heel Strike;Posture; Safety;Speed;Strength;Swing   Provided Assistance with: Direction   Walk Assist Level Close Supervision;Supervision   Does the patient walk? 2.  Yes   Curb or Single Stair   Style negotiated Curb   Type of Assistance Needed Incidental touching;Verbal cues; Supervision; Adaptive equipment   Comment CS/CGA with RW over 6" curb x2 reps, VC's for safety and sequencing. 1 Step (Curb) CARE Score 4   12 Steps   Reason if not Attempted Safety concerns   12 Steps CARE Score 88   Stairs   Type Curb   # of Steps 2   Weight Bearing Precautions Fall Risk   Assist Devices Roller Walker   Toilet Transfer   Type of Assistance Needed Supervision; Adaptive equipment   Comment CS with RW   Toilet Transfer CARE Score 4   Toilet Transfer   Surface Assessed Raised Toilet   Therapeutic Interventions   Strengthening seated LAQ, hip flex, ankle DF/PF, hams curls with green tband, hip ADD vs ball and hip ABD vs green tband. 3 x10 reps BLE with 2#. STS 2 x5 reps   Equipment Use   NuStep refused   Assessment   Treatment Assessment Pt participated in skilled PT session with increased focus on gait, increased curb step negotiation, increased balance, safety and L sided awareness. Pt requires VC's for functional transfers as she cont to have L side neglect and does not turn enough prior to sitting. Pt also given multiple VC's throughout gait to slow down and maintain within RW. Pt is recommended 24/7 S at home with anticipated discharge Friday 7/21/23 with HHPT services. Cont POC as tolerated with cont focus on increased safety, increased L side attention, increased LE strengthening and endurance training. Problem List Decreased strength;Decreased range of motion;Decreased endurance; Impaired balance;Decreased mobility; Decreased coordination;Decreased cognition; Impaired judgement;Decreased safety awareness;Pain;Orthopedic restrictions   Barriers to Discharge Inaccessible home environment;Decreased caregiver support   PT Barriers   Functional Limitation Car transfers;Stair negotiation;Transfers; Walking   Plan   Treatment/Interventions Functional transfer training;LE strengthening/ROM; Therapeutic exercise; Endurance training;Cognitive reorientation;Gait training   Recommendation   PT Discharge Recommendation Home with home health rehabilitation   Equipment Recommended Walker   PT Therapy Minutes   PT Time In 1330   PT Time Out 1500   PT Total Time (minutes) 90   PT Mode of treatment - Individual (minutes) 90   PT Mode of treatment - Concurrent (minutes) 0   PT Mode of treatment - Group (minutes) 0   PT Mode of treatment - Co-treat (minutes) 0   PT Mode of Treatment - Total time(minutes) 90 minutes   PT Cumulative Minutes 633   Therapy Time missed   Time missed?  No

## 2023-07-18 NOTE — PROGRESS NOTES
Internal Medicine Progress Note  Patient: Lawrence Ortega  Age/sex: 70 y.o. female  Medical Record #: [id number]      ASSESSMENT/PLAN: (Interval History)  Lawrence Ortega is seen and examined and management for following issues:    Left lateral thigh hematoma   • Stable     ABLA  • S/p 1 unit PRBCs 7/2/23  • CBC 7/18/23 stable     Thrush  • S/p Nystatin     Chronic combined systolic/diastolic heart failure   • Originally diagnosed 2004 when LVEF then was 10-15% and felt to be NICM  • Her LVEF did recover into 50's then fell when she had her CVA in 2/2023 to 30%  • Also has moderate AI/gr 2 DD  • Sees Dr Rolando Castellanos a [facility] in 2/2023 when LVEF was found to be 30% which is most current  • Had been on Coreg in past but HF team switched her to Toprol when she had her stroke 2/2023  • Her soft BPs have limited starting her on ACEI/ARB as OP  • Home:  Toprol XL 25mg BID/Aldactone 25mg qd  • Here:  Toprol XL 25 mg QD = able to get last 3 days  • D/C'd Aldactone 7/16  • stable     Hypercalcemia  • Continue Cinacalcet here as was taking at home  • Calcium 8.9 on 7/18/23     HS hypoxia   • using 4L NC HS at home  • Not using O2 at night here     Nightmares  • placed on Minipress when here with us after her CVA  • D/w Dr Juvenal Guerin, d/c'd since had been too low to get  • Minipress dc'd 2/2 hypotension     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  • Has residual left sided weakness  • Had recrudescence of CVA 4/2023 with worsening left facial droop  • Continue Eliquis     Dysphagia  • Had admission for aspiration PNA at [facility] 5/2023 tx'd with Levaquin and Flagyl  • Continue pureed diet with NTL as at home  • Continue Ensure HP pudding TID  • ST following     Bipolar depression  • Home:  Wellbutrin 100mg BID/Cymbalta 60mg BID/Lamictal 150mg qhs  • Here:  Wellbutrin 100mg BID/Cymbalta 60mg BID/Lamictal 150mg qhs     Rheumatoid arthritis  • Continue Plaquenil 200mg BID as at home     Chronic pain/chronic opioid use  • 2/2 RA and lumbar spondylosis  • Pain management per PMR     Hx incidental right superior hypophyseal artery aneurysm  • Follows with NS Dr Jing Parker     [facility] urine 3/2023  • Maintain contact isolation     Dye allergy   • Was dye prepped on 7/4/23 with Benadryl 50mg IV x 1 w/o issues     Incidental findings  • Pancolitis with increasing inflammatory free fluid in pelvis  • Calcified granuloma RLL     Hypokalemia  • Got Kdur 40 meq x 2 on 7/8/23  • Stable at 4.3     Enterococcus UTI  • S/p Elias Shivers Dr Depadua = sensitive        Discharge date:  7/21/23       The above assessment and plan was reviewed and updated as determined by my evaluation of the patient on 7/18/2023.     Labs:   Results from last 7 days   Lab Units 07/18/23  0516 07/13/23  0648   WBC Thousand/uL 5.07 5.95   HEMOGLOBIN g/dL 9.9* 10.1*   HEMATOCRIT % 30.9* 32.3*   PLATELETS Thousands/uL 220 295     Results from last 7 days   Lab Units 07/18/23  0516 07/13/23  0648   SODIUM mmol/L 143 140   POTASSIUM mmol/L 4.3 4.1   CHLORIDE mmol/L 109* 112*   CO2 mmol/L 30 26   BUN mg/dL 18 17   CREATININE mg/dL 0.84 0.83   CALCIUM mg/dL 8.9 8.4                   Review of Scheduled Meds:  Current Facility-Administered Medications   Medication Dose Route Frequency Provider Last Rate   • acetaminophen  650 mg Oral Q6H Didier Evangelista MD     • albuterol  2 puff Inhalation Q6H PRN Sophie Kennedy MD     • apixaban  5 mg Oral BID Sophie Kennedy MD     • buPROPion  100 mg Oral BID Sophie Kennedy MD     • cinacalcet  30 mg Oral Daily With Breakfast Sophie Kennedy MD     • Diclofenac Sodium  2 g Topical 4x Daily PRN Sophie Kennedy MD     • DULoxetine  60 mg Oral BID Sophie Kennedy MD     • gabapentin  400 mg Oral BID Sophie Kennedy MD     • hydroxychloroquine  200 mg Oral BID With Meals Sophie Kennedy MD     • lamoTRIgine  150 mg Oral HS Sophie Kennedy MD     • lidocaine  1 patch Topical Daily LAURA Rutledge     • magnesium Oxide  400 mg Oral BID Edith Gerardo MD     • melatonin  3 mg Oral HS Edith Gerardo MD     • metoprolol succinate  25 mg Oral Daily LAURA Zayas     • morphine  7.5 mg Oral BID PRN LAURA Davila     • ondansetron  4 mg Oral Q6H PRN Edith Gerardo MD     • pantoprazole  40 mg Oral Early Morning Edith Gerardo MD     • polyethylene glycol  17 g Oral Daily PRN LAURA Rutledge     • simethicone  80 mg Oral 4x Daily (with meals and at bedtime) LAURA Jones         Subjective/ HPI: Patient seen and examined. Patients overnight issues or events were reviewed with nursing or staff during rounds or morning huddle session. New or overnight issues include the following:     No new or overnight issues. Offers no complaints    ROS:   A 10 point ROS was performed; negative except as noted above. Imaging:     FL barium swallow ROUTINE esophagus    (Results Pending)       *Labs /Radiology studies reviewed  *Medications reviewed and reconciled as needed  *Please refer to order section for additional ordered labs studies  *Case discussed with primary attending during morning huddle case rounds    Physical Examination:  Vitals:   Vitals:    07/17/23 2052 07/18/23 0509 07/18/23 0619 07/18/23 0941   BP: 100/52 105/58 110/62 100/60   BP Location: Left arm Right arm Left arm Right arm   Pulse: 87 87  80   Resp: 18 20     Temp: 97.9 °F (36.6 °C) 97.9 °F (36.6 °C)     TempSrc: Oral Axillary     SpO2: 96% 96%     Weight:  53.2 kg (117 lb 4.8 oz)     Height:           General Appearance: no distress, conversive  HEENT:  External ear normal.  Nose normal w/o drainage. Mucous membranes are moist. Oropharynx is clear. Conjunctiva clear w/o icterus or redness. Neck:  Supple, normal ROM  Lungs: BBS without crackles/wheeze/rhonchi; respirations unlabored with normal inspiratory/expiratory effort. No retractions noted. On RA  CV: regular rate and rhythm; no rubs/murmurs/gallops, PMI normal   ABD: Abdomen is soft.   Bowel sounds all quadrants. Nontender with no distention. EXT: no edema  Skin: normal turgor, normal texture, no rashes  Psych: affect normal, mood normal  Neuro: AAO    The above physical exam was reviewed and updated as determined by my evaluation of the patient on 7/18/2023. Invasive Devices     None                    VTE Pharmacologic Prophylaxis: Eliquis  Code Status: Level 1 - Full Code  Current Length of Stay: 11 day(s)      Total time spent:  30 minutes with more than 50% spent counseling/coordinating care. Counseling includes discussion with patient re: progress  and discussion with patient of his/her current medical state/information. Coordination of patient's care was performed in conjunction with primary service. Time invested included review of patient's labs, vitals, and management of their comorbidities with continued monitoring. In addition, this patient was discussed with medical team including physician and advanced extenders. The care of the patient was extensively discussed and appropriate treatment plan was formulated unique for this patient. Medical decision making for the day was made by supervising physician unless otherwise noted in their attestation statement. ** Please Note:  voice to text software may have been used in the creation of this document.  Although proof errors in transcription or interpretation are a potential of such software**

## 2023-07-19 ENCOUNTER — APPOINTMENT (INPATIENT)
Dept: RADIOLOGY | Facility: HOSPITAL | Age: 68
DRG: 949 | End: 2023-07-19
Payer: MEDICARE

## 2023-07-19 ENCOUNTER — APPOINTMENT (OUTPATIENT)
Dept: RADIOLOGY | Facility: HOSPITAL | Age: 68
DRG: 949 | End: 2023-07-19
Payer: MEDICARE

## 2023-07-19 PROCEDURE — 92526 ORAL FUNCTION THERAPY: CPT

## 2023-07-19 PROCEDURE — 72125 CT NECK SPINE W/O DYE: CPT

## 2023-07-19 PROCEDURE — G0316 PR PROLONG INPT EVAL ADD15 M: HCPCS

## 2023-07-19 PROCEDURE — 99233 SBSQ HOSP IP/OBS HIGH 50: CPT

## 2023-07-19 PROCEDURE — 70450 CT HEAD/BRAIN W/O DYE: CPT

## 2023-07-19 PROCEDURE — 99418 PROLNG IP/OBS E/M EA 15 MIN: CPT

## 2023-07-19 PROCEDURE — NC001 PR NO CHARGE: Performed by: PHYSICIAN ASSISTANT

## 2023-07-19 PROCEDURE — 73502 X-RAY EXAM HIP UNI 2-3 VIEWS: CPT

## 2023-07-19 PROCEDURE — 97535 SELF CARE MNGMENT TRAINING: CPT

## 2023-07-19 PROCEDURE — 97110 THERAPEUTIC EXERCISES: CPT

## 2023-07-19 PROCEDURE — G1004 CDSM NDSC: HCPCS

## 2023-07-19 PROCEDURE — 97530 THERAPEUTIC ACTIVITIES: CPT

## 2023-07-19 PROCEDURE — 97116 GAIT TRAINING THERAPY: CPT

## 2023-07-19 PROCEDURE — 99232 SBSQ HOSP IP/OBS MODERATE 35: CPT | Performed by: INTERNAL MEDICINE

## 2023-07-19 RX ORDER — MORPHINE SULFATE 15 MG/1
7.5 TABLET ORAL 3 TIMES DAILY PRN
Status: DISCONTINUED | OUTPATIENT
Start: 2023-07-19 | End: 2023-07-21

## 2023-07-19 RX ADMIN — APIXABAN 5 MG: 5 TABLET, FILM COATED ORAL at 21:04

## 2023-07-19 RX ADMIN — GABAPENTIN 400 MG: 400 CAPSULE ORAL at 18:59

## 2023-07-19 RX ADMIN — MAGNESIUM OXIDE TAB 400 MG (241.3 MG ELEMENTAL MG) 400 MG: 400 (241.3 MG) TAB at 11:41

## 2023-07-19 RX ADMIN — LAMOTRIGINE 150 MG: 100 TABLET ORAL at 21:11

## 2023-07-19 RX ADMIN — ACETAMINOPHEN 650 MG: 325 TABLET, FILM COATED ORAL at 05:21

## 2023-07-19 RX ADMIN — DULOXETINE HYDROCHLORIDE 60 MG: 60 CAPSULE, DELAYED RELEASE ORAL at 18:59

## 2023-07-19 RX ADMIN — MORPHINE SULFATE 7.5 MG: 15 TABLET ORAL at 16:45

## 2023-07-19 RX ADMIN — APIXABAN 5 MG: 5 TABLET, FILM COATED ORAL at 11:41

## 2023-07-19 RX ADMIN — PANTOPRAZOLE SODIUM 40 MG: 40 TABLET, DELAYED RELEASE ORAL at 05:21

## 2023-07-19 RX ADMIN — SIMETHICONE 80 MG: 80 TABLET, CHEWABLE ORAL at 21:11

## 2023-07-19 RX ADMIN — MELATONIN TAB 3 MG 3 MG: 3 TAB at 21:11

## 2023-07-19 RX ADMIN — GABAPENTIN 400 MG: 400 CAPSULE ORAL at 11:41

## 2023-07-19 RX ADMIN — METOPROLOL SUCCINATE 25 MG: 25 TABLET, FILM COATED, EXTENDED RELEASE ORAL at 11:41

## 2023-07-19 RX ADMIN — LIDOCAINE 5% 1 PATCH: 700 PATCH TOPICAL at 11:40

## 2023-07-19 RX ADMIN — ACETAMINOPHEN 650 MG: 325 TABLET, FILM COATED ORAL at 23:47

## 2023-07-19 RX ADMIN — SIMETHICONE 80 MG: 80 TABLET, CHEWABLE ORAL at 16:48

## 2023-07-19 RX ADMIN — CINACALCET 30 MG: 30 TABLET, FILM COATED ORAL at 11:53

## 2023-07-19 RX ADMIN — HYDROXYCHLOROQUINE SULFATE 200 MG: 200 TABLET ORAL at 16:50

## 2023-07-19 RX ADMIN — DULOXETINE HYDROCHLORIDE 60 MG: 60 CAPSULE, DELAYED RELEASE ORAL at 11:41

## 2023-07-19 RX ADMIN — ACETAMINOPHEN 650 MG: 325 TABLET, FILM COATED ORAL at 18:59

## 2023-07-19 RX ADMIN — BUPROPION HYDROCHLORIDE 100 MG: 100 TABLET, FILM COATED ORAL at 19:01

## 2023-07-19 RX ADMIN — MAGNESIUM OXIDE TAB 400 MG (241.3 MG ELEMENTAL MG) 400 MG: 400 (241.3 MG) TAB at 18:59

## 2023-07-19 RX ADMIN — MORPHINE SULFATE 7.5 MG: 15 TABLET ORAL at 11:02

## 2023-07-19 RX ADMIN — HYDROXYCHLOROQUINE SULFATE 200 MG: 200 TABLET ORAL at 11:53

## 2023-07-19 RX ADMIN — ACETAMINOPHEN 650 MG: 325 TABLET, FILM COATED ORAL at 11:40

## 2023-07-19 RX ADMIN — SIMETHICONE 80 MG: 80 TABLET, CHEWABLE ORAL at 11:41

## 2023-07-19 RX ADMIN — BUPROPION HYDROCHLORIDE 100 MG: 100 TABLET, FILM COATED ORAL at 11:53

## 2023-07-19 RX ADMIN — ONDANSETRON 4 MG: 4 TABLET, ORALLY DISINTEGRATING ORAL at 22:42

## 2023-07-19 NOTE — PROGRESS NOTES
Updated PT ARC Goals. Initially goals where for both ambulation and WC mobility. Discharged WC mobility goals as patient has been able to consistently ambulate household distances. Prior to admission, patient reports using WC for longer community distances and family would push her.       07/19/23 1200   Locomotion Goal   Primary discharge mode of locomotion Walking   Target Walk Distance 150 ft   Assist Device Roller Walker   Gait Pattern Improvement Inconsistant Amy;Narrow AYLA; Forward Flexion; Improper weight shift   Environment Level Surface; Uneven Surface; Well Lit   Walk 10 feet Goal 04. Supervision or touching assistance- [place] provides VERBAL CUES or supervision throughout activity. Walk 50 feet with 2 turns Goal 04. Supervision or touching assistance- [place] provides VERBAL CUES or supervision throughout activity. Walk 150 feet Goal 04. Supervision or touching assistance- [place] provides VERBAL CUES or supervision throughout activity. Walking 10 feet on uneven surface 04. Supervision or touching assistance- [place] provides VERBAL CUES or supervision throughout activity. Wheel 50 feet with 2 turns Goal 09. Not applicable   Wheel 402 feet Goal 09.  Not applicable

## 2023-07-19 NOTE — PROGRESS NOTES
PM&R PROGRESS NOTE:  Chyna Packer 70 y.o. female MRN: [id number]  Unit/Bed#: -01 Encounter: [id number]    Rehabilitation Diagnosis: Impairment of mobility, safety, Activities of Daily Living (ADLs), and cognitive/communication skills due to Other Disabling Impairments:  13  Other Disabling Impairments    HPI: Chyna Packer is a 70 y.o. female with medical history of HTN, CVA in 2/2023 on eliquis (chronic dysphagia, L sided weakness, cognitive impairment), hypercalcemia,  CHF, GERD, SHAR, gout, bipolar disorder, RA on plaquenil, chronic opioid dependence on morphine for chronic back/joint pain, who presented to the [address] [place] [address] [address] on 6/30 after a fall, unable to get up and disoriented. She thinks she tripped and fell down her porch steps, landing on he L thigh. Found to have a L lateral thigh hematoma with evidence of extravasation on imaging. Binder placed over hips, and eliquis was held but not reversed given her recent stroke. Speech consulted and noted oropharyngeal swallow likely at baseline and recommended Level 1/NTL. Recommended for VBS. Course c/b ABLA on 7/2 Hgb dropped to 6.8 and she was given 1 unit pRBC. Geriatrics was consulted, noted at baseline she is oriented but forgetful, with worsening issues since her stroke. Mini-Cog was 9/15 in 2021. They recommended checking B12. They noted she has not been on lorazepam since January, and also recommended weaning down off gabapentin if possible. On 7/4 patient had an unwitnessed fall while trying to reach for her bag on the window sill. No head strike, but landed on her L hip. GCS 15, Neuro exam was baseline. CTA ordered to look for active bleeding, which showed decreased size of hematoma with no active extravasation. Hgb did drop after her transfusion, but seems to have stablized today at 8.5.  That being said, her HR was elevated today, and it appears a troponin has been ordered by the trauma team as well as a CXR. CXR showed hazy RLL opacity, likely 2/2 atelectasis, and trops trended down. Metoprolol was increased with improvement. She was admitted to the [facility] on 7/7. SUBJECTIVE: Patient seen face to face. No acute issues overnight. Slept well, acute/chronic pain controlled with current regimen. Patient went for esophagogram this am, unable to complete d/t aspiration risk. Discussed with patient at length; adamantly refuses PEG placement; updated daughter, Altaf Valentin. Plan to follow-up outpatient with Nutritionist and Gastroenterology. Poor meal completion, voiding, LBM 7/17. Denies chest pain, shortness of breath, fever, chills, N/V, abdominal pain. ASSESSMENT: Stable, progressing    PLAN:  - poor intake: encourage meal completion, nutrition consulted and following, dietary supplementation between meals. Follow-up outpatient with Nutritionist and GI  - anemia- CBC 7/20  - pain management: continue current regimen   - constipation- LBM 7/17, monitor, encourage fluids    Rehabilitation  Team conference: The team discussed patient's functional status, goals, and barriers. • Functional deficits:  cognitive impairment, self-care, impaired mobility  • Continue current rehabilitation plan of care to maximize function.    • Functional update:   Physical Therapy Occupational Therapy Speech Therapy   Weight Bearing Status: Full Weight Bearing  Transfers: Supervision  Bed Mobility: Supervision  Amulation Distance (ft): 180 feet  Ambulation: Supervision  Assistive Device for Ambulation: Roller Walker  Wheelchair Mobility Distance:  (n/a)  Wheelchair Mobility:  (n/a)  Number of Stairs: 6  Assistive Device for Stairs: Bilateral [employer]  Stair Assistance: Incidental Touching  Ramp: Incidental Touching  Assistive Device for Ramp: Roller Walker  Discharge Recommendations: Home with:  [address] [place] [address] with[de-identified] 24 Hour Supervision, Family Support, First Floor Setup, Home Physical Therapy   Eating: Supervision  Grooming: Supervision  Bathing: Incidental Touching  Bathing: Incidental Touching  Upper Body Dressing: Moderate Assistance  Lower Body Dressing: Minimal Assistance  Toileting: Minimal Assistance  Toilet Transfer: Supervision  Cognition: Exceptions to WNL  Cognition: Decreased Memory, Decreased Executive Functions, Decreased Attention, Decreased Comprehension, Impulsive, Decreased Safety  Orientation: Person, Place, Situation   Mode of Communication: Verbal  Cognition: Exceptions to WNL  Cognition: Decreased Memory, Decreased Executive Functions, Decreased Attention, Decreased Comprehension, Decreased Safety  Orientation: Person, Place, Time, Situation  Swallowing: Exceptions to WNL  Swallowing: Oral Dysphagia, Pharyngeal Dysphagia, Esophageal Dysphagia, Aspiration Risk, Reflux Precautions  Diet Recommendations: Level 1/Puree, [place] Thick  Discharge Recommendations: Home with:  DC Home with[de-identified] 24 Hour Assisteance, 24 Hour Supervision, Family Support, Home Speech Therapy     • Estimated Discharge: anticipated 7/21 with home PT/OT    Pain  • Tylenol 975 mg every 8 hours  • Lidocaine patch  • Gabapentin 400 mg BID  • Diclofenac Sodium topical gel QID prn  • Oxycodone 2.5-5 mg every 4 hours prn    DVT prophylaxis  • Eliquis    Bladder plan  • Continent    Bowel plan  • Continent      * Leg hematoma, left, initial encounter  Assessment & Plan  2/2 fall    6/30 CT CAP:  Large hematoma in the lateral left thigh measuring up to 9.6 cm with focus of active contrast extravasation. No acute intrathoracic or intra-abdominal injury. 7/4 CTA Pelvis:  1. Decreasing size of left lateral thigh hematoma without evidence for active extravasation. 2.  No CT findings in the visualized pelvis and thighs to explain falling hematocrit. 3.  Pancolitis with increasing inflammatory free fluid in the pelvis. No free air, pneumatosis, or encapsulated collections demonstrated. Non-operatively managed.   Did require transfusion x1  Pain management as below  Monitor Hgb.  PT/OT 3-5 hours/day, 5-7 days/week  Outpatient f/u with General Surgery      History of stroke  Assessment & Plan  MRI brain 2/22 - 1.  New small acute /more recent interval lacunar infarction in the right cerebral peduncle (series 4, image 14). 2.  Extensive multifocal large, previously present right middle cerebral artery infarction which is evolving with superimposed areas of evolving hemorrhage likely related to hemorrhagic transformation of subacute right MCA infarction.  No significant   mass effect. 3.  Mild, chronic microangiopathy. S/P thrombectomy 2/12 by Dr West Render   Secondary stroke prophylaxis at this time with Eliquis 5 mg twice daily   PT, OT, SLP. Review modifiable risk factors and provide stroke education  Monitor for poststroke pain  Eliquis for secondary stroke prophylaxis    Outpatient f/u with Neurology    CHF (congestive heart failure) ([address])  Assessment & Plan  Wt Readings from Last 3 Encounters:   07/19/23 53.3 kg (117 lb 8 oz)   06/30/23 55.3 kg (122 lb)   06/06/23 47.9 kg (105 lb 9.6 oz)     2/2023 Echo 30% with G2DD, moderate AI  Home: Toprol XL 25mg daily, Aldactone 25mg daily   - Limited by BP - no ACE/ARB   - no SGLT2i due to recurrent UTI  Here: Toprol XL 25mg BID, spironolactone 25mg daily   Unclear etiology of cardiomyopathy  Possible consideration for ischemic work-up outpatient, poor candidate for aggressive procedures   Monitor I/O/daily weights, volume status, lytes. IM consulted to assist with management  Outpatient f/u with Dr. Anthony Conner      Dysphagia  Assessment & Plan  2/2 previous CVA earlier this year.   Noted to have impaired oropharyngeal swallow  Was on free water protocol at home - holding for now due to thrush  On Level 1/NTL  - Inpatient SLP recommended VBS  - Consulting SLP here  - Aspiration precautions  - Strict oral hygiene prior to trials  - esophagogram scheduled for 7/19, unable to complete d/t Aspiration risk    Urinary tract infection without hematuria  Assessment & Plan  Present on admission  With incontinence and confusion  Grew 100k CFU Enterococcus    - Started on Macrobid based on antibiogram on 7/9   - sensitivity confirms susceptibility  Macrobid complete, monitor for signs of reoccurrence     Mixed stress and urge urinary incontinence  Assessment & Plan  Frankly incontinent here  Initiate timed voids and scans  Has history of recurrent UTIs - will check UA on admission here given extent of incontinence   - Had confusion on 7/8-7/9 overnight. - See UTI for details. Close continence care      Anemia  Assessment & Plan  2/2 L leg hematoma (most recent imaging negative for extravasation)  At home on eliquis  No reversal inpatient due to recent CVA  Prior to admission Hgb 10-12. Ravi 7/2 6.8 - given 1 unit pRBC  7/8 Hgb 8.5 > 9.2 > 9.3 > 10.1 > 9.9  Monitor, transfuse as appropriate. IM consulted to assist with management    Cervical dystonia  Assessment & Plan  Recommend outpatient f/u with Dr. Patrick Méndez to evaluate for botulinum toxin injections  Appears to have a retrocollis. Sundowning  Assessment & Plan  Does wake up confused at night, but is easily redirectable  - More confusion, than agitation  - Wandered all the way down to the cafeteria on the 1st floor on 7/9  - No injuries  - Brought back to her room  - UCx with 100k enterococcus. - treat UTI  - Low bed, Q15hr checks overnight, Sleep logs  - Floor mats. - Delirium precautions    Night terrors  Assessment & Plan  Home: Prazosin  Here: none  Continue melatonin  Outpatient f/u with PCP    Thrush  Assessment & Plan  Noted on admission  7/7 started on nystatin swish/spit  - 7/11 improving  - Continue for at least 7 days with 2-3 days after her antibiotics stop  - completed, resolved    Hypomagnesemia  Assessment & Plan  Home: Mag oxide 800mg BID  Here: Mag oxide 400mg BID  7/9 Mag 1.9   Goals > 2.     Sinus tachycardia  Assessment & Plan  Home: Toprol XL 25mg BID  Here: Same  On 7/6 with HR in 115-120. EKG showed sinus tach. Trops trended and negative. - Associated with transient/mild SOB   - She was only being given Toprol daily in the hospital   - Increased to BID with improvement  Lytes with K > 4, Mag > 2  Monitor and adjust regimen as appropriate  IM consulted to assist with management  Outpatient f/u with Dr. Jorge A Whalen    Obstructive sleep apnea  Assessment & Plan  Previously non-compliant with hospital or home CPAP  Outpatient f/u with PCP. Hypercalcemia  Assessment & Plan  Suspect 2/2 primary hyperparathyroidism  7/8 iCal 1.12, Phos 3.3  Home: Sensipar 30mg daily  Here: Same  Recommend outpatient f/u with Endocrinology      HTN (hypertension)  Assessment & Plan  Tends to run on the low/normal side. Home: Toprol XL 25mg daily, Spironolactone 25mg daily  Here: Same  Has not been able to get meds at times due to not meeting parameters. Monitor and adjust as appropriate  - Spironolactone held 7/15 and d/c 7/16  IM consulted to assist with management    Bipolar depression [facility])  Assessment & Plan  Home: Bupropion 100mg BID, Cymbalta 60mg BID, Ativan 0.25mg BID PRN (however this has not been filled since January 2023 as per PDMP)  Here: Bupropion 100mg BID, Cymbalta 60mg BID  Consulting rehab psychology for support  Adjust as appropriate  Outpatient f/u with PCP    Severe protein-calorie malnutrition ([address])  Assessment & Plan  Malnutrition Findings:                                 BMI Findings: Body mass index is 20.89 kg/m².      Consulted nutrition    Osteoporosis  Assessment & Plan  Hx L1 compression fx s/p kyphoplasty  Was previously on Prolia - last given in 2020   - Held due to dental issues  Recommend outpatient f/u with Endocrinology  2/23 Vitamin D level is 41.7    Gastroesophageal reflux disease without esophagitis  Assessment & Plan  Home: PPI daily  Here: PPI daily    Seronegative arthropathy of multiple sites [facility])  Assessment & Plan  Follows with Dr. Freeman Shah with Rheum and is on Hydroxychloroquine 200mg BID. Continue home regimen and follow-up with rheumatology on discharge    Mild intermittent asthma without complication  Assessment & Plan  No acute exacerbation  Home: Albuterol PRN  Here: Same    Chronic pain syndrome  Assessment & Plan  Joint and back pain  Follows with Dr. Thomas Kilgore with [facility]  Reviewed PDMP - at home on MSIR 7.5mg BID PRN. Here: MSIR 7.5mg BID PRN  - Outpatient f/u with Dr. Thomas Kilgore. Appreciate IM consultants medical co-management. Labs, medications, and imaging personally reviewed. ROS:  Review of Systems   A 10 point review of systems was negative except for what is noted in the HPI. OBJECTIVE:   /67 (BP Location: Right arm)   Pulse 83   Temp 97.9 °F (36.6 °C) (Oral)   Resp 16   Ht 5' 3" (1.6 m)   Wt 53.3 kg (117 lb 8 oz)   SpO2 95%   BMI 20.81 kg/m²     Physical Exam  Constitutional:       Appearance: Normal appearance. HENT:      Head: Normocephalic and atraumatic. Nose: Nose normal.      Mouth/Throat:      Mouth: Mucous membranes are moist.   Cardiovascular:      Rate and Rhythm: Normal rate and regular rhythm. Pulses: Normal pulses. Heart sounds: Normal heart sounds. Pulmonary:      Effort: Pulmonary effort is normal.      Breath sounds: Normal breath sounds. Abdominal:      General: Bowel sounds are normal.      Palpations: Abdomen is soft. Musculoskeletal:         General: Deformity present. Normal range of motion. Comments: +kyphosis   Skin:     General: Skin is warm and dry. Capillary Refill: Capillary refill takes less than 2 seconds. Findings: Bruising present. Neurological:      Mental Status: She is alert and oriented to person, place, and time.       Gait: Gait abnormal.   Psychiatric:         Mood and Affect: Mood normal.         Judgment: Judgment normal.        Lab Results   Component Value Date    WBC 5.07 07/18/2023    HGB 9.9 (L) 07/18/2023    HCT 30.9 (L) 07/18/2023  (H) 07/18/2023     07/18/2023     Lab Results   Component Value Date    SODIUM 143 07/18/2023    K 4.3 07/18/2023     (H) 07/18/2023    CO2 30 07/18/2023    BUN 18 07/18/2023    CREATININE 0.84 07/18/2023    GLUC 82 07/18/2023    CALCIUM 8.9 07/18/2023     Lab Results   Component Value Date    INR 1.33 (H) 07/01/2023    INR 1.65 (H) 06/30/2023    INR 1.66 (H) 04/29/2023    PROTIME 16.7 (H) 07/01/2023    PROTIME 19.5 (H) 06/30/2023    PROTIME 19.9 (H) 04/29/2023           Current Facility-Administered Medications:   •  acetaminophen (TYLENOL) tablet 650 mg, 650 mg, Oral, Q6H [facility], Thao Coles MD, 650 mg at 07/19/23 1140  •  albuterol (PROVENTIL HFA,VENTOLIN HFA) inhaler 2 puff, 2 puff, Inhalation, Q6H PRN, Thao Coles MD, 2 puff at 07/15/23 1603  •  apixaban (ELIQUIS) tablet 5 mg, 5 mg, Oral, BID, Thao Coles MD, 5 mg at 07/19/23 1141  •  buPROPion [facility]) tablet 100 mg, 100 mg, Oral, BID, Thao Coles MD, 100 mg at 07/19/23 1153  •  cinacalcet (SENSIPAR) tablet 30 mg, 30 mg, Oral, Daily With Breakfast, Thao Coles MD, 30 mg at 07/19/23 1153  •  Diclofenac Sodium (VOLTAREN) 1 % topical gel 2 g, 2 g, Topical, 4x Daily PRN, Thao Coles MD  •  DULoxetine (CYMBALTA) delayed release capsule 60 mg, 60 mg, Oral, BID, Thao Coles MD, 60 mg at 07/19/23 1141  •  gabapentin (NEURONTIN) capsule 400 mg, 400 mg, Oral, BID, Thao Coles MD, 400 mg at 07/19/23 1141  •  hydroxychloroquine (PLAQUENIL) tablet 200 mg, 200 mg, Oral, BID With Meals, Thao Coles MD, 200 mg at 07/19/23 1153  •  lamoTRIgine (LaMICtal) tablet 150 mg, 150 mg, Oral, HS, Thao Coles MD, 150 mg at 07/18/23 2127  •  lidocaine (LIDODERM) 5 % patch 1 patch, 1 patch, Topical, Daily, LAURA Rutledge, 1 patch at 07/19/23 1140  •  magnesium Oxide (MAG-OX) tablet 400 mg, 400 mg, Oral, BID, Thao Coles MD, 400 mg at 07/19/23 1141  •  melatonin tablet 3 mg, 3 mg, Oral, HS, Thao Coles MD, 3 mg at 07/18/23 2033  •  metoprolol succinate (TOPROL-XL) 24 hr tablet 25 mg, 25 mg, Oral, Daily, LAURA Zavala, 25 mg at 07/19/23 1141  •  morphine (MSIR) IR tablet 7.5 mg, 7.5 mg, Oral, BID PRN, LAURA Rutledge, 7.5 mg at 07/19/23 1102  •  ondansetron (ZOFRAN-ODT) dispersible tablet 4 mg, 4 mg, Oral, Q6H PRN, Durga Valladares MD, 4 mg at 07/17/23 1935  •  pantoprazole (PROTONIX) EC tablet 40 mg, 40 mg, Oral, Early Morning, Durga Valladares MD, 40 mg at 07/19/23 8622  •  polyethylene glycol (MIRALAX) packet 17 g, 17 g, Oral, Daily PRN, LAURA Rutledge, 17 g at 07/17/23 0934  •  simethicone (MYLICON) chewable tablet 80 mg, 80 mg, Oral, 4x Daily (with meals and at bedtime), LAURA Davis, 80 mg at 07/19/23 1141    Past Medical History:   Diagnosis Date   • Anxiety    • Asthma    • Bipolar depression ([address])    • Chronic narcotic dependence (HCC)    • Chronic pain    • Depression    • Enterovirus heart infection    • SHAR (obstructive sleep apnea)    • Osteoarthritis    • Peripheral neuropathy    • Plantar fasciitis of right foot    • Senile osteoporosis    • Seronegative arthropathy of multiple sites [facility])    • Undifferentiated connective tissue disease ([address])        Patient Active Problem List    Diagnosis Date Noted   • Leg hematoma, left, initial encounter 07/01/2023   • History of stroke 02/28/2023   • CHF (congestive heart failure) ([address])    • Dysphagia 03/09/2023   • Urinary tract infection without hematuria 03/06/2023   • Mixed stress and urge urinary incontinence    • Anemia 06/26/2021   • Cervical dystonia 07/11/2023   • Sundowning 07/09/2023   • Thrush 07/07/2023   • Night terrors 07/07/2023   • Cerebral aneurysm, nonruptured 05/15/2023   • Nocturnal hypoxia 05/01/2023   • Stroke-like symptoms 05/01/2023   • Insomnia 04/03/2023   • Hypomagnesemia 03/30/2023   • Sinus tachycardia 03/29/2023   • Hypokalemia 03/10/2023   • AMS (altered mental status) 03/09/2023   • Gout of foot 03/09/2023   • Wound of left ankle 03/09/2023   • Obstructive sleep apnea 02/28/2023   • History of bacteremia 02/24/2023   • Acute CVA (cerebrovascular accident) ([address]) 02/23/2023   • Hypercalcemia 02/22/2023   • Discoloration of skin of foot 02/22/2023   • Abdominal pain 02/22/2023   • HFrEF (heart failure with reduced ejection fraction) ([address]) 02/13/2023   • Stroke ([address]) 02/12/2023   • HTN (hypertension) 02/12/2023   • Cardiomyopathy ([address]) 02/07/2023   • Vitamin D insufficiency 02/07/2023   • LAILA (acute kidney injury) ([address]) 04/23/2022   • Polypharmacy 04/23/2022   • 'light-for-dates' infant with signs of fetal malnutrition 04/22/2022   • Bipolar depression ([address]) 03/04/2022   • Rheumatoid arthritis of multiple sites with negative rheumatoid factor ([address]) 03/04/2022   • Closed fracture of multiple ribs of right side 03/02/2022   • Traumatic pneumothorax 03/02/2022   • Closed fracture of transverse process of lumbar vertebra ([address]) 03/02/2022   • Liver contusion 03/02/2022   • Fall 03/02/2022   • Acute pain due to trauma 03/02/2022   • Right shoulder pain 03/02/2022   • Severe protein-calorie malnutrition ([address]) 03/02/2022   • Undifferentiated connective tissue disease ([address]) 12/29/2021   • Primary generalized (osteo)arthritis 12/29/2021   • Peripheral neuropathy 12/29/2021   • Osteoporosis 12/29/2021   • Hx of compression fracture of spine 12/29/2021   • Lumbar spondylosis 12/29/2021   • Cervical spondylosis 12/29/2021   • Chronic pain syndrome    • Mild intermittent asthma without complication    • Ambulatory dysfunction    • Seronegative arthropathy of multiple sites ([address])    • Gastroesophageal reflux disease without esophagitis         LAURA Dang  Physical Medicine [facility]

## 2023-07-19 NOTE — PLAN OF CARE
Problem: PAIN - ADULT  Goal: Verbalizes/displays adequate comfort level or baseline comfort level  Description: Interventions:  - Encourage patient to monitor pain and request assistance  - Assess pain using appropriate pain scale  - Administer analgesics based on type and severity of pain and evaluate response  - Implement non-pharmacological measures as appropriate and evaluate response  - Consider cultural and social influences on pain and pain management  - Notify physician/advanced practitioner if interventions unsuccessful or patient reports new pain  Outcome: Progressing     Problem: INFECTION - ADULT  Goal: Absence or prevention of progression during hospitalization  Description: INTERVENTIONS:  - Assess and monitor for signs and symptoms of infection  - Monitor lab/diagnostic results  - Monitor all insertion sites, i.e. indwelling lines, tubes, and drains  - Monitor endotracheal if appropriate and nasal secretions for changes in amount and color  - In[place] appropriate cooling/warming therapies per order  - Administer medications as ordered  - Instruct and encourage patient and family to use good hand hygiene technique  - Identify and instruct in appropriate isolation precautions for identified infection/condition  Outcome: Progressing  Goal: Absence of fever/infection during neutropenic period  Description: INTERVENTIONS:  - Monitor WBC    Outcome: Progressing     Problem: SAFETY ADULT  Goal: Patient will remain free of falls  Description: INTERVENTIONS:  - Educate patient/family on patient safety including physical limitations  - Instruct patient to call for assistance with activity   - Consult OT/PT to assist with strengthening/mobility   - Keep Call bell within reach  - Keep bed low and locked with side rails adjusted as appropriate  - Keep care items and personal belongings within reach  - Initiate and maintain comfort rounds  - Make Fall Risk Sign visible to staff  - Offer Toileting every 2 Hours, in advance of need  - Initiate/Maintain bed/chair alarm  - Obtain necessary fall risk management equipment: bed/chair alarm  - Apply yellow socks and bracelet for high fall risk patients  - Consider moving patient to room near nurses station  Outcome: Progressing  Goal: Maintain or return to baseline ADL function  Description: INTERVENTIONS:  -  Assess patient's ability to carry out ADLs; assess patient's baseline for ADL function and identify physical deficits which impact ability to perform ADLs (bathing, care of mouth/teeth, toileting, grooming, dressing, etc.)  - Assess/evaluate cause of self-care deficits   - Assess range of motion  - Assess patient's mobility; develop plan if impaired  - Assess patient's need for assistive devices and provide as appropriate  - Encourage maximum independence but intervene and supervise when necessary  - Involve family in performance of ADLs  - Assess for home care needs following discharge   - Consider OT consult to assist with ADL evaluation and planning for discharge  - Provide patient education as appropriate  Outcome: Progressing  Goal: Maintains/Returns to pre admission functional level  Description: INTERVENTIONS:  - Perform BMAT or MOVE assessment daily.   - Set and communicate daily mobility goal to care team and patient/family/caregiver. - Collaborate with rehabilitation services on mobility goals if consulted  - Perform Range of Motion 3 times a day. - Reposition patient every 2 hours.   - Dangle patient 3 times a day  - Stand patient 3 times a day  - Ambulate patient 3 times a day  - Out of bed to chair 3 times a day   - Out of bed for meals 3 times a day  - Out of bed for toileting  - Record patient progress and toleration of activity level   Outcome: Progressing     Problem: DISCHARGE PLANNING  Goal: Discharge to home or other facility with appropriate resources  Description: INTERVENTIONS:  - Identify barriers to discharge w/patient and caregiver  - Arrange for needed discharge resources and transportation as appropriate  - Identify discharge learning needs (meds, wound care, etc.)  - Arrange for interpretive services to assist at discharge as needed  - Refer to Case Management Department for coordinating discharge planning if the patient needs post-hospital services based on physician/advanced practitioner order or complex needs related to functional status, cognitive ability, or social support system  Outcome: Progressing     Problem: Prexisting or High Potential for Compromised Skin Integrity  Goal: Skin integrity is maintained or improved  Description: INTERVENTIONS:  - Identify patients at risk for skin breakdown  - Assess and monitor skin integrity  - Assess and monitor nutrition and hydration status  - Monitor labs   - Assess for incontinence   - Turn and reposition patient  - Assist with mobility/ambulation  - Relieve pressure over bony prominences  - Avoid friction and shearing  - Provide appropriate hygiene as needed including keeping skin clean and dry  - Evaluate need for skin moisturizer/barrier cream  - Collaborate with interdisciplinary team   - Patient/family teaching  - Consider wound care consult   Outcome: Progressing     Problem: Nutrition/Hydration-ADULT  Goal: Nutrient/Hydration intake appropriate for improving, restoring or maintaining nutritional needs  Description: Monitor and assess patient's nutrition/hydration status for malnutrition. Collaborate with interdisciplinary team and initiate plan and interventions as ordered. Monitor patient's weight and dietary intake as ordered or per policy. Utilize nutrition screening tool and intervene as necessary. Determine patient's food preferences and provide high-protein, high-caloric foods as appropriate.      INTERVENTIONS:  - Monitor oral intake, urinary output, labs, and treatment plans  - Assess nutrition and hydration status and recommend course of action  - Evaluate amount of meals eaten  - Assist patient with eating if necessary   - Allow adequate time for meals  - Recommend/ encourage appropriate diets, oral nutritional supplements, and vitamin/mineral supplements  - Order, calculate, and assess calorie counts as needed  - Recommend, monitor, and adjust tube feedings and TPN/PPN based on assessed needs  - Assess need for intravenous fluids  - Provide specific nutrition/hydration education as appropriate  - Include patient/family/caregiver in decisions related to nutrition  Outcome: Progressing     Problem: MOBILITY - ADULT  Goal: Maintain or return to baseline ADL function  Description: INTERVENTIONS:  -  Assess patient's ability to carry out ADLs; assess patient's baseline for ADL function and identify physical deficits which impact ability to perform ADLs (bathing, care of mouth/teeth, toileting, grooming, dressing, etc.)  - Assess/evaluate cause of self-care deficits   - Assess range of motion  - Assess patient's mobility; develop plan if impaired  - Assess patient's need for assistive devices and provide as appropriate  - Encourage maximum independence but intervene and supervise when necessary  - Involve family in performance of ADLs  - Assess for home care needs following discharge   - Consider OT consult to assist with ADL evaluation and planning for discharge  - Provide patient education as appropriate  Outcome: Progressing  Goal: Maintains/Returns to pre admission functional level  Description: INTERVENTIONS:  - Perform BMAT or MOVE assessment daily.   - Set and communicate daily mobility goal to care team and patient/family/caregiver. - Collaborate with rehabilitation services on mobility goals if consulted  - Perform Range of Motion 3 times a day. - Reposition patient every 2 hours.   - Dangle patient 3 times a day  - Stand patient 3 times a day  - Ambulate patient 3 times a day  - Out of bed to chair 3 times a day   - Out of bed for meals 3 times a day  - Out of bed for toileting  - Record patient progress and toleration of activity level   Outcome: Progressing     Problem: Nutrition/Hydration-ADULT  Goal: Nutrient/Hydration intake appropriate for improving, restoring or maintaining nutritional needs  Description: Monitor and assess patient's nutrition/hydration status for malnutrition. Collaborate with interdisciplinary team and initiate plan and interventions as ordered. Monitor patient's weight and dietary intake as ordered or per policy. Utilize nutrition screening tool and intervene as necessary. Determine patient's food preferences and provide high-protein, high-caloric foods as appropriate.      INTERVENTIONS:  - Monitor oral intake, urinary output, labs, and treatment plans  - Assess nutrition and hydration status and recommend course of action  - Evaluate amount of meals eaten  - Assist patient with eating if necessary   - Allow adequate time for meals  - Recommend/ encourage appropriate diets, oral nutritional supplements, and vitamin/mineral supplements  - Order, calculate, and assess calorie counts as needed  - Recommend, monitor, and adjust tube feedings and TPN/PPN based on assessed needs  - Assess need for intravenous fluids  - Provide specific nutrition/hydration education as appropriate  - Include patient/family/caregiver in decisions related to nutrition  Outcome: Progressing

## 2023-07-19 NOTE — PROGRESS NOTES
07/19/23 1030   Pain Assessment   Pain Assessment Tool 0-10   Pain Score 9   Pain Location/Orientation Location: Groin; Location: Abdomen  (L groin area)   Pain Onset/Description Onset: Ongoing;Frequency: Constant/Continuous   Hospital Pain Intervention(s) Repositioned; Emotional support  (nsg present during OT session to provide pain med)   Restrictions/Precautions   Precautions Aspiration;Bed/chair alarms;Cognitive; Fall Risk;Impulsive;Supervision on toilet/commode;Visual deficit;Contact/isolation   Weight Bearing Restrictions No   ROM Restrictions No   Oral Hygiene   Type of Assistance Needed Set-up / clean-up   Physical Assistance Level No physical assistance   Comment S/U A to brush teeth and complete denture care while seated in w/c at sink   Oral Hygiene CARE Score 5   Grooming   Able To Initiate Tasks;Comb/Brush Hair;Brush/Clean Teeth;Wash/Dry Hands   Limitation Noted In Coordination;Neglect;Problem Solving; Safety;Strength;Timeliness   Findings S/U A for all grooming tasks while seated in w/c at sink   Shower/Bathe Self   Type of Assistance Needed Incidental touching;Verbal cues   Physical Assistance Level No physical assistance   Comment Pt declined shower, engaging in sponge bath instead while seated in w/c at sink, able to wash 10/10 parts with increased time 2* decreased B/L FMC causing pt to require verbal and tactile cueing and increased time to manage washcloth. Seated with Sup to wash UB and BLEs, then CGA in stance while pt washed santiago/rear, no LOB. Shower/Bathe Self CARE Score 4   Bathing   Assessed Bath Style Sponge Bath   Anticipated D/C Bath Style Shower;Sponge Bath   Able to [place] No   Able to [employer] Temperature No   Able to Wash/Rinse/Dry (body part) Left Arm;Right Arm;L Upper Leg;R Upper Leg;L Lower Leg/Foot;R Lower Leg/Foot;Chest;Abdomen;Perineal Area; Buttocks   Limitations Noted in Balance; Coordination; Endurance;Neglect;Problem Solving; Safety;Strength; Sequencing;Timeliness;Vision   Positioning Seated;Standing   Tub/Shower Transfer   Reason Not Assessed Patient refusal;Sponge Bath   Upper Body Dressing   Type of Assistance Needed Physical assistance;Verbal cues   Physical Assistance Level 51%-75%   Comment Seated with modA to don [place] shirt, with verbal and tactile cues to follow hernandez dressing technique, vc's to orient shirt 2* visual deficits   Upper Body Dressing CARE Score 2   Lower Body Dressing   Type of Assistance Needed Physical assistance;Verbal cues   Physical Assistance Level 26%-50%   Comment seated in w/c, pt cont to require A to thread LLE, able to thread RLE with increased time, and CGA in stance for CM over hips with max vc's to ensure CM up over L hip   Lower Body Dressing CARE Score 3   Putting On/Taking Off Footwear   Type of Assistance Needed Physical assistance   Physical Assistance Level 51%-75%   Comment pt cont to require A to don B/L socks 2* LUE weakness, pt able to doff socks with R hand   Putting On/Taking Off Footwear CARE Score 2   Dressing/Undressing Clothing   Remove UB Clothes   (hospital gown)   Don UB Clothes Pullover Shirt   Remove LB Clothes Undergarment;Socks   Don LB Clothes Pants; Undergarment;Socks   Limitations Noted In Balance; Coordination; Endurance; Safety;Strength;Problem Solving;Neglect;ROM; Timeliness;Vision   Positioning Supported Sit;Standing   Lying to Sitting on Side of Bed   Type of Assistance Needed Supervision   Physical Assistance Level No physical assistance   Comment increased time required to manage BLEs   Lying to Sitting on Side of Bed CARE Score 4   Sit to Stand   Type of Assistance Needed Supervision; Adaptive equipment   Physical Assistance Level No physical assistance   Comment RW   Sit to Stand CARE Score 4   Bed-Chair Transfer   Type of Assistance Needed Supervision; Adaptive equipment   Physical Assistance Level No physical assistance   Comment CS fxl mob w/RW, no LOB Chair/Bed-to-Chair Transfer CARE Score 4   Toileting Hygiene   Type of Assistance Needed Physical assistance;Verbal cues   Physical Assistance Level 25% or less   Comment Seated with Sup for santiago hygiene, CM up/down in stance with CGA and max vc's for CM up over L hip, with pt cont to require Marika to manage underwear fully up over L hip   [place] Score 3   Toileting   Able to [address] down yes, up no. Manage Hygiene Bladder   Limitations Noted In Balance; Coordination;Problem Solving; Safety;UE Strength;LE Strength   Adaptive Equipment Grab Bar   Toilet Transfer   Type of Assistance Needed Supervision; Adaptive equipment;Verbal cues   Physical Assistance Level No physical assistance   Comment CS fxl mob w/RW, EOB<>standard toilet, with pt utilizing GBs as well. Pt attempted to stand impulsively from toilet when finished urinating, requiring max vc's to return to seated until harman could provide CGA in stance for pt safety   Toilet Transfer CARE Score 4   Toilet Transfer   Surface Assessed Standard Toilet   Transfer Technique Standard   Limitations Noted In Balance; Endurance;UE Strength;LE Strength; Sequencing;Confidence;Problem Solving; Safety   Adaptive Equipment Grab Bar;Walker   Positioning Concerns Cognition; Safety; Arm Rest   Exercise Tools   Exercise Tools Yes   Other Exercise Tool 1 Seated w/Sup, [id number] each of LUE only composite digit flexion (gross grasp) followed by isolated digit flexion utilizing green foam block, with focus on L /pinch strengthening for improved fxl utilization of LUE during ADL tasks. Pt initially attempted to complete at rapid pace, requiring vc's to slow pace to improve therapeutic value, pt receptive. Pt reported moderate difficulty and required rest breaks between sets to manage fatigue. Cognition   Overall Cognitive Status Impaired   Arousal/Participation Alert; Cooperative   Attention Attends with cues to redirect   Orientation Level Oriented X4   Memory Decreased short term memory;Decreased recall of recent events;Decreased recall of precautions   Following Commands Follows one step commands with increased time or repetition   Activity Tolerance   Activity Tolerance Patient tolerated treatment well   Assessment   Treatment Assessment Pt seen for 60min skilled OT session focused on ADL retraining (sponge bath), edu/carryover of hernandez dressing technique, bed mobility, fxl transfers/ short-distance fxl mob w/RW, toileting, and LUE /pinch strength, for increased independence w/ADLs and decreased caregiver burden. See detailed descriptions of fxl performance above. Pt tolerated session well but was limited by L groin pain, harman notified nsg who was present to provide pain med. Pt also cont to be limited by L-sided weakness, decreased cognition, chronic back/joint pain, decreased strength/endurance, and decreased standing balance. Pt completed transfers and sit<>stands at CS level today, but continues to demo decreased safety awareness when managing RW and would benefit from RW safety edu/training. Pt would benefit from continued skilled OT focused on ADL retraining, fxl transfer training, UE strengthening/ROM, endurance training, repetitive hernandez dressing technique edu, LUE neuro re-ed, L FMC, visual scanning, and L attention. Pt anticipated to D/C home Fri with home therapy. Prognosis Fair   Problem List Decreased strength;Decreased range of motion;Decreased endurance; Impaired balance;Decreased mobility; Decreased coordination;Decreased cognition; Impaired judgement;Decreased safety awareness; Impaired vision;Pain   Barriers to Discharge Inaccessible home environment;Decreased caregiver support   Plan   Treatment/Interventions ADL retraining;Functional transfer training; Therapeutic exercise; Endurance training;Cognitive reorientation;Patient/family training;Equipment eval/education; Bed mobility; Compensatory technique education;Spoke to nursing   Progress Progressing toward goals   Recommendation   OT Discharge Recommendation Home with home health rehabilitation   OT Therapy Minutes   OT Time In 1030   OT Time Out 1130   OT Total Time (minutes) 60   OT Mode of treatment - Individual (minutes) 60   OT Mode of treatment - Concurrent (minutes) 0   OT Mode of treatment - Group (minutes) 0   OT Mode of treatment - Co-treat (minutes) 0   OT Mode of Treatment - Total time(minutes) 60 minutes   OT Cumulative Minutes 908   Therapy Time missed   Time missed?  No

## 2023-07-19 NOTE — PROGRESS NOTES
07/19/23 1215   Pain Assessment   Pain Assessment Tool 0-10   Pain Score No Pain   Restrictions/Precautions   Precautions Aspiration;Bed/chair alarms;Cognitive; Fall Risk;Impulsive;Contact/isolation;Pain;Supervision on toilet/commode   Eating   Type of Assistance Needed Set-up / clean-up;Supervision;Verbal cues   Physical Assistance Level No physical assistance   Eating CARE Score 4   Swallow Assessment   Swallow Treatment Assessment   Daily Dysphagia Tx Note     Patient Name: Kanwal Barrios    YBBJS'M Date: 7/19/2023      Current Risks for Dysphagia & Aspiration: Weak voicing; weak cough; dysarthria; general debilitation; new neuro event; brain injury; cognitive deficit; hx neurologic dx; positioning issues     Current Symptoms/Concerns: difficulty chewing; decreased oral intake; hx of dysphagia/aspiration; throat clearing; cough with intake     Current diet:puree/level 1 diet and nectar thick liquids      Premorbid diet::puree/level 1 diet and nectar thick liquids      Positioning: upright in wheelchair    Items administered:Consistencies Administered: nectar thick and puree  Materials administered included: pureed carrots, mashed potatoes, applesauce, magic cup, NTL by cup    Total amount of meal consumed:   100% of pureed eggs (pt consumed prior to ST entering session)  100% of pureed carrots  50% of mashed potatoes  25% of magic cup  100% applesauce      Oral stage:mild-moderate  Lip closure: observed to be functional around tsp/cup  Anterior spillage: none  Manipulation: mild-moderately decreased/reduced puree>NT  Bolus formation: mildly decreased  Bolus control: likely decreased w/ NT  Transfer: mild-moderate dealy  Oral residue: none  Pocketing: none         Pharyngeal stage:mild-moderate  Swallow promptness: varied throughout meal but ranged from mild delay w/ NT and mild- moderate delay w/ puree  Hyolaryngeal elevation: continues to be decreased    Wet voice: none observed   Throat clear:none  Cough: x1 after larger sip of NTL by cup and post meal x1  Secondary swallows: inconsistently observed throughout meal w/ both puree and NT  Audible swallows: noted more towards end of meal likely 2* fatigue       Esophageal stage:No sxs were observed given meal today        Summary:     Pt presenting with mild-moderate oral and pharyngeal dysphagia today. Symptoms or concerns included decreased bolus propulsion, decreased mastication, decreased bolus formation, delayed oral intiation, suspected decreased control of NTL  and piecemeal deglutition suspected pharyngeal swallow delay, suspected decreased hyolaryngeal elevation upon palpation, suspected pharyngeal residue, multiple swallows, effortful swallow, audible swallows and cough x1 after NTL and cough x1 post meal.      Pt was already consuming meal upon arrival for session (which SLP was to see pt from 1230, but arrived at 1215), but not being supervised w/ meal, as per recommendations which have been for FULL SUPERVISION for meals. SLP did address this w/ nsg at end of meal. However, pt was not exhibiting overt difficulty given meal. Pt's voicing was clear and pt not exhibiting increased throat clear/coughing upon arrival to session. It was noted that decreased attention did occur to where pt continued to talk throughout meal, which pt was able to verbalize to SLP about how "I shouldn't be talking while I'm eating." However, despite being able to verbalize this, continued to intermittently talk prior to initiating swallows ~25% of the time, needing visual/verbal cues. Otherwise, bolus manipulation given puree when not distracted or talking was mildly delayed given manipulation/transfer and mildly decreased formulation, BUT pt allowing enough time between bites/swallows to clear any possible residual. When taking NT by cup, overall pt was able to demonstrate small single sips until end of meal where larger successive sip was taken.  Due to pt's delay in swallow, it was likely that as pt elicited swallow and excursion, decreased airway protection occurred leading to strong cough response x1. Otherwise and even despite talking at times during meal, pt did overall not exhibit increased or overt cough, throat clearing, increased wet voicing or changes in respiratory status. Pt did have delayed cough x1 post meal, but could be possibly related to reflux sxs (as pt has known hx of this). Recommendations:  Diet: puree/level 1 diet and nectar thick liquids  Meds: whole or cut with puree  Strategies: upright posture, only feed when fully alert, slow rate of feeding, small bites/sips, effortful swallow, quiet environment (tv off, limit talking, door closed, etc.) and alternating bites and sips     FULL supervision w/ meals. Of note SLP entered room while pt was eating. Pt is currently FULL SUPERVISION w/ meals. SLP notified RN about this at end of session. Results reviewed with: patient, Jaylyn Burch RN  Aspiration precautions posted. F/u ST tx: Pt is currently recommended for further skilled SLP services targeting dysphagia therapy in order to maximize oral and pharyngeal swallow skills, while safely supporting PO intake, as well as to improve independent carryover of safe swallow strategies. Plan: Cont to monitor pt on current diet              Trial upgraded textures of dysphagia level 2 and thin liquids as appropriate              Will benefit from f/u with GI team which pt follows with [facility] given esophageal concerns. Cont education on puree diet and NTL with strategies for at home (education folder started in room with handouts)   Swallow Assessment Prognosis   Prognosis Fair   Prognosis Considerations Age; Co-morbidities; Family/community support;Medical diagnosis; Medical prognosis;Previous level of function;Severity of impairments;New learning ability;Ability to carry over   SLP Therapy Minutes   SLP Time In 1215   SLP Time Out 1245   SLP Total Time (minutes) 30 SLP Mode of treatment - Individual (minutes) 30   SLP Mode of treatment - Concurrent (minutes) 0   SLP Mode of treatment - Group (minutes) 0   SLP Mode of treatment - Co-treat (minutes) 0   SLP Mode of Treatment - Total time(minutes) 30 minutes   SLP Cumulative Minutes 435   Therapy Time missed   Time missed?  No

## 2023-07-19 NOTE — RAPID RESPONSE
Rapid Response Note  El Max 70 y.o. female MRN: [id number]  Unit/Bed#: -01 Encounter: [id number]    Rapid Response Notification(s):   Response called date/time:  7/19/2023 4:08 PM  Response team arrival date/time:  7/19/2023 4:12 PM  Response end date/time:  7/19/2023 4:20 PM  Level of care:  [facility]  Rapid response location:  Acute rehab center  Primary reason for rapid response call: Fall    Rapid Response Intervention(s):   Airway:  None  Breathing:  None  Circulation:  None  Fluids administered:  None  Medications administered:  None       Assessment:   · Unwitnessed fall on eliquis    Plan:   · Recommend STAT CT head/C-spine per protocol      Rapid Response Outcome:   Transfer:  Remain on floor  Primary service notified of transfer: Yes    Code Status: Level 1 (Full Code)      Family notified of transfer: no  Family member contacted: n/a     Background/Situation:   El Max is a 70 y.o. female who had an unwitnessed fall today prompting rapid response. Review of Systems   Negative    Objective:   Vitals:    07/19/23 0900 07/19/23 1140 07/19/23 1313 07/19/23 1613   BP: 110/64 108/62 116/67 117/68   BP Location: Right arm Right arm Right arm    Pulse: 96 90 83 68   Resp: 18 18 16    Temp:   97.9 °F (36.6 °C)    TempSrc:   Oral    SpO2:   95%    Weight:       Height:         Physical Exam  Vitals and nursing note reviewed. Constitutional:       General: She is not in acute distress. Appearance: She is not ill-appearing. HENT:      Head: Normocephalic and atraumatic. Neck:      Comments: No midline c-spine tenderness  Cardiovascular:      Rate and Rhythm: Normal rate and regular rhythm. Musculoskeletal:      Cervical back: Full passive range of motion without pain. No rigidity. No pain with movement. Comments: L buttock pain   Neurological:      General: No focal deficit present. Mental Status: She is alert and oriented to person, place, and time.       GCS: GCS eye subscore is 4. GCS verbal subscore is 5. GCS motor subscore is 6. Sensory: Sensation is intact. Motor: Motor function is intact. Psychiatric:         Behavior: Behavior is cooperative.              Brooklyn Martines PA-C

## 2023-07-19 NOTE — PROGRESS NOTES
07/19/23 0830   Therapy Time missed   Time missed?  Yes   Amount of time missed 60   Reason for time missed Medical procedure  (pt off the unit for esophagram)

## 2023-07-19 NOTE — PLAN OF CARE
Problem: PAIN - ADULT  Goal: Verbalizes/displays adequate comfort level or baseline comfort level  Description: Interventions:  - Encourage patient to monitor pain and request assistance  - Assess pain using appropriate pain scale  - Administer analgesics based on type and severity of pain and evaluate response  - Implement non-pharmacological measures as appropriate and evaluate response  - Consider cultural and social influences on pain and pain management  - Notify physician/advanced practitioner if interventions unsuccessful or patient reports new pain  Outcome: Progressing     Problem: INFECTION - ADULT  Goal: Absence or prevention of progression during hospitalization  Description: INTERVENTIONS:  - Assess and monitor for signs and symptoms of infection  - Monitor lab/diagnostic results  - Monitor all insertion sites, i.e. indwelling lines, tubes, and drains  - Monitor endotracheal if appropriate and nasal secretions for changes in amount and color  - [place] appropriate cooling/warming therapies per order  - Administer medications as ordered  - Instruct and encourage patient and family to use good hand hygiene technique  - Identify and instruct in appropriate isolation precautions for identified infection/condition  Outcome: Progressing  Goal: Absence of fever/infection during neutropenic period  Description: INTERVENTIONS:  - Monitor WBC    Outcome: Progressing     Problem: SAFETY ADULT  Goal: Patient will remain free of falls  Description: INTERVENTIONS:  - Educate patient/family on patient safety including physical limitations  - Instruct patient to call for assistance with activity   - Consult OT/PT to assist with strengthening/mobility   - Keep Call bell within reach  - Keep bed low and locked with side rails adjusted as appropriate  - Keep care items and personal belongings within reach  - Initiate and maintain comfort rounds  - Make Fall Risk Sign visible to staff  - Offer Toiletins, in advance of need  - Initiate/Main  - Obtain necessary fall risk management equip  - Apply yellow socks and bracelet for high fall risk patients  - Consider moving patient to room near nurses station  Outcome: Progressing  Goal: Maintain or return to baseline ADL function  Description: INTERVENTIONS:  -  Assess patient's ability to carry out ADLs; assess patient's baseline for ADL function and identify physical deficits which impact ability to perform ADLs (bathing, care of mouth/teeth, toileting, grooming, dressing, etc.)  - Assess/evaluate cause of self-care deficits   - Assess range of motion  - Assess patient's mobility; develop plan if impaired  - Assess patient's need for assistive devices and provide as appropriate  - Encourage maximum independence but intervene and supervise when necessary  - Involve family in performance of ADLs  - Assess for home care needs following discharge   - Consider OT consult to assist with ADL evaluation and planning for discharge  - Provide patient education as appropriate  Outcome: Progressing  Goal: Maintains/Returns to pre admission functional level  Description: INTERVENTIONS:  - Perform BMAT or MOVE assessment daily.   - Set and communicate daily mobility goal to care team and patient/family/caregiver. - Collaborate with rehabilitation services on mobility goals if consulted  - Perform Range of Jeffry a day. - Reposition patienurs.   - Dangle patient   - Stand patie  - Ambulate luis  - Out of bed to chair   - Out of bed for me  - Out of bed f  - Record patient progress and toleration of activity level   Outcome: Progressing     Problem: DISCHARGE PLANNING  Goal: Discharge to home or other facility with appropriate resources  Description: INTERVENTIONS:  - Identify barriers to discharge w/patient and caregiver  - Arrange for needed discharge resources and transportation as appropriate  - Identify discharge learning needs (meds, wound care, etc.)  - Arrange for interpretive services to assist at discharge as needed  - Refer to Case Management Department for coordinating discharge planning if the patient needs post-hospital services based on physician/advanced practitioner order or complex needs related to functional status, cognitive ability, or social support system  Outcome: Progressing     Problem: Prexisting or High Potential for Compromised Skin Integrity  Goal: Skin integrity is maintained or improved  Description: INTERVENTIONS:  - Identify patients at risk for skin breakdown  - Assess and monitor skin integrity  - Assess and monitor nutrition and hydration status  - Monitor labs   - Assess for incontinence   - Turn and reposition patient  - Assist with mobility/ambulation  - Relieve pressure over bony prominences  - Avoid friction and shearing  - Provide appropriate hygiene as needed including keeping skin clean and dry  - Evaluate need for skin moisturizer/barrier cream  - Collaborate with interdisciplinary team   - Patient/family teaching  - Consider wound care consult   Outcome: Progressing     Problem: Nutrition/Hydration-ADULT  Goal: Nutrient/Hydration intake appropriate for improving, restoring or maintaining nutritional needs  Description: Monitor and assess patient's nutrition/hydration status for malnutrition. Collaborate with interdisciplinary team and initiate plan and interventions as ordered. Monitor patient's weight and dietary intake as ordered or per policy. Utilize nutrition screening tool and intervene as necessary. Determine patient's food preferences and provide high-protein, high-caloric foods as appropriate.      INTERVENTIONS:  - Monitor oral intake, urinary output, labs, and treatment plans  - Assess nutrition and hydration status and recommend course of action  - Evaluate amount of meals eaten  - Assist patient with eating if necessary   - Allow adequate time for meals  - Recommend/ encourage appropriate diets, oral nutritional supplements, and vitamin/mineral supplements  - Order, calculate, and assess calorie counts as needed  - Recommend, monitor, and adjust tube feedings and TPN/PPN based on assessed needs  - Assess need for intravenous fluids  - Provide specific nutrition/hydration education as appropriate  - Include patient/family/caregiver in decisions related to nutrition  Outcome: Progressing     Problem: MOBILITY - ADULT  Goal: Maintain or return to baseline ADL function  Description: INTERVENTIONS:  -  Assess patient's ability to carry out ADLs; assess patient's baseline for ADL function and identify physical deficits which impact ability to perform ADLs (bathing, care of mouth/teeth, toileting, grooming, dressing, etc.)  - Assess/evaluate cause of self-care deficits   - Assess range of motion  - Assess patient's mobility; develop plan if impaired  - Assess patient's need for assistive devices and provide as appropriate  - Encourage maximum independence but intervene and supervise when necessary  - Involve family in performance of ADLs  - Assess for home care needs following discharge   - Consider OT consult to assist with ADL evaluation and planning for discharge  - Provide patient education as appropriate  Outcome: Progressing  Goal: Maintains/Returns to pre admission functional level  Description: INTERVENTIONS:  - Perform BMAT or MOVE assessment daily.   - Set and communicate daily mobility goal to care team and patient/family/caregiver. - Collaborate with rehabilitation services on mobility goals if consulted  - Perform Range of M a day. - Reposition patieurs.   - Dangle luis  - Stand patien  - Ambulate patie  - Out of bed to chair  - Out of bed for  - Out of bed for toileting  - Record patient progress and toleration of activity level   Outcome: Progressing     Problem: Nutrition/Hydration-ADULT  Goal: Nutrient/Hydration intake appropriate for improving, restoring or maintaining nutritional needs  Description: Monitor and assess patient's nutrition/hydration status for malnutrition. Collaborate with interdisciplinary team and initiate plan and interventions as ordered. Monitor patient's weight and dietary intake as ordered or per policy. Utilize nutrition screening tool and intervene as necessary. Determine patient's food preferences and provide high-protein, high-caloric foods as appropriate.      INTERVENTIONS:  - Monitor oral intake, urinary output, labs, and treatment plans  - Assess nutrition and hydration status and recommend course of action  - Evaluate amount of meals eaten  - Assist patient with eating if necessary   - Allow adequate time for meals  - Recommend/ encourage appropriate diets, oral nutritional supplements, and vitamin/mineral supplements  - Order, calculate, and assess calorie counts as needed  - Recommend, monitor, and adjust tube feedings and TPN/PPN based on assessed needs  - Assess need for intravenous fluids  - Provide specific nutrition/hydration education as appropriate  - Include patient/family/caregiver in decisions related to nutrition  Outcome: Progressing

## 2023-07-19 NOTE — NURSING NOTE
Upon getting report pt complained on 8/10 in her leg and 5/10 pain in her back. Pt had recently taken her scheduled Tylenol and early had taken her PRN morphine. Per provider notes, morphine adjusted from q12h scheduled to BID PRN as the pt is at high risk for delirium. One time dose ordered by provider for breakthrough pain. Advised by provider to hold scheduled tylenol later if pt is sleeping/sedated. One time dose given to patient with reported relief now at 3/10 pain. Pt viewed asleep in bed, no acute signs of distress.

## 2023-07-19 NOTE — TEAM CONFERENCE
Acute RehabilitationTeam Conference Note  Date: 7/19/2023   Time: 11:34 AM       Patient Name:  Sravani Castaneda       Medical Record Number: [id number]   YOB: 1955  Sex: Female          [facility]  [facility]  Payor Info:  Payor: MEDICARE / Plan: MEDICARE A AND B / Product Type: Medicare A & B Fee for Service /      Admitting Diagnosis: Hematoma of leg, left, initial encounter [S80.12XA]  Hematoma of left hip [S70.02XA]   Admit Date/Time:  7/7/2023 11:57 AM  Admission Comments: No comment available     Primary Diagnosis:  Leg hematoma, left, initial encounter  Principal Problem: Leg hematoma, left, initial encounter    Patient Active Problem List    Diagnosis Date Noted   • Cervical dystonia 07/11/2023   • Sundowning 07/09/2023   • Thrush 07/07/2023   • Night terrors 07/07/2023   • Leg hematoma, left, initial encounter 07/01/2023   • Cerebral aneurysm, nonruptured 05/15/2023   • Nocturnal hypoxia 05/01/2023   • Stroke-like symptoms 05/01/2023   • Insomnia 04/03/2023   • Hypomagnesemia 03/30/2023   • Sinus tachycardia 03/29/2023   • Hypokalemia 03/10/2023   • AMS (altered mental status) 03/09/2023   • Gout of foot 03/09/2023   • Dysphagia 03/09/2023   • Wound of left ankle 03/09/2023   • Urinary tract infection without hematuria 03/06/2023   • Obstructive sleep apnea 02/28/2023   • History of stroke 02/28/2023   • History of bacteremia 02/24/2023   • Acute CVA (cerebrovascular accident) ([address]) 02/23/2023   • Hypercalcemia 02/22/2023   • Discoloration of skin of foot 02/22/2023   • Abdominal pain 02/22/2023   • HFrEF (heart failure with reduced ejection fraction) ([address]) 02/13/2023   • Stroke ([address]) 02/12/2023   • HTN (hypertension) 02/12/2023   • Cardiomyopathy ([address]) 02/07/2023   • Vitamin D insufficiency 02/07/2023   • LAILA (acute kidney injury) ([address]) 04/23/2022   • Polypharmacy 04/23/2022   • 'light-for-dates' infant with signs of fetal malnutrition 04/22/2022   • Bipolar depression ([address]) 03/04/2022   • Rheumatoid arthritis of multiple sites with negative rheumatoid factor ([address]) 03/04/2022   • Closed fracture of multiple ribs of right side 03/02/2022   • Traumatic pneumothorax 03/02/2022   • Closed fracture of transverse process of lumbar vertebra ([address]) 03/02/2022   • Liver contusion 03/02/2022   • Fall 03/02/2022   • Acute pain due to trauma 03/02/2022   • Right shoulder pain 03/02/2022   • Severe protein-calorie malnutrition ([address]) 03/02/2022   • Undifferentiated connective tissue disease ([address]) 12/29/2021   • Primary generalized (osteo)arthritis 12/29/2021   • Peripheral neuropathy 12/29/2021   • Osteoporosis 12/29/2021   • Hx of compression fracture of spine 12/29/2021   • Lumbar spondylosis 12/29/2021   • Cervical spondylosis 12/29/2021   • Anemia 06/26/2021   • Chronic pain syndrome    • Mild intermittent asthma without complication    • Ambulatory dysfunction    • Seronegative arthropathy of multiple sites ([address])    • Gastroesophageal reflux disease without esophagitis    • Mixed stress and urge urinary incontinence    • CHF (congestive heart failure) ([address])        Physical Therapy:    Weight Bearing Status: Full Weight Bearing  Transfers: Supervision  Bed Mobility: Supervision  Amulation Distance (ft): 180 feet  Ambulation: Supervision  Assistive Device for Ambulation: Roller Walker  Wheelchair Mobility Distance:  (n/a)  Wheelchair Mobility:  (n/a)  Number of Stairs: 6  Assistive Device for Stairs: Bilateral [employer]  Stair Assistance: Incidental Touching  Ramp: Incidental Touching  Assistive Device for Ramp: Roller Walker  Discharge Recommendations: Home with:  [address] [place] [address] with[de-identified] 24 Hour Supervision, Family Support, First Floor Setup, Home Physical Therapy    Date: 7/12/2023  Date of Evaluation: 7/10/2023  Estimated Length of Stay: 10-14 days     Barriers to Discharge Home: decreased caregiver support, cognition, high fall risk, decreased righting reactions, inaccessible home environment (1 TAB)  PT Interventions to address Barriers: bed mobility, transfer training, gait training, higher level dynamic balance, single step/curb navigation. Equipments Needs: pt reports having RW at home. PT Plan of Care for the Week: gait training with RW, transfer training, LE strengthening, higher level dynamic balance   Family Training Needed: TBD pending patient progress   Discharge Planning: home health PT vs subacute rehab    Date: 7/18/23  Date of Evaluation:7/10/2023  Estimated Length of Stay: D/C Friday 7/21/23    Barriers to Discharge Home: cognition, high fall risk, decreased righting reactions  PT Interventions to address Barriers: higher level dynamic balance, single step/curb navigation, LE strengthening, repetitive gait  Equipments Needs: has RW  PT Plan of Care for the Week: gait training with RW, transfer training, LE strengthening, higher level dynamic balance   Family Training Needed: Family declined stating she feels she is same as PLOF  Discharge Planning: home health PT        Occupational Therapy:  Eating: Supervision  Grooming: Supervision  Bathing: Incidental Touching  Bathing: Incidental Touching  Upper Body Dressing: Moderate Assistance  Lower Body Dressing: Minimal Assistance  Toileting: Minimal Assistance  Toilet Transfer: Supervision  Cognition: Exceptions to WNL  Cognition: Decreased Memory, Decreased Executive Functions, Decreased Attention, Decreased Comprehension, Impulsive, Decreased Safety  Orientation: Person, Place, Situation  Discharge Recommendations: Home with:       Pt reports PLOF as needing some assistance for bathing/dressing with dtr coming 3x per week to help her shower, having someone with her during grooming tasks, staying on first floor of home and walking with RW though pt does have WC at home for distances. Pt reports having the following DME available at home: WC, RW, grab bars, handheld shower head, and shower chair. Pt reports 3-4 falls at home.  Upon evaluation, pt current level of fxn: modA for UB/LB dressing with inc time, Marika with mod VC with RW for functional transfers, Marika for toileting, and Marika for grooming due to the following deficits impacting occupational performance: deconditioning, impaired stand tolerance, impaired standing balance, pain mgmt, dec UE/LE strength, impaired cognition/decreased safety, and Hx of multiple falls and pt self report of fear of falling. Based on OT evaluation, pt demonstrates good rehab potential and would benefit from skilled OT services to reach pt goals of supervision w/ ELOS 2 weeks to return to least restrictive environment pending pt progress with family support and emphasis on caregiver education/resources to decrease pt risk of falls and rehospitalization. Goals for this week: continue to focus on ADL management, func transfers with RW and attention to L body/visual field. D/C date: TBD.     7/18/23 update:  Pt demo's progress since previous assessment with self care tasks and func transfers with RW progressing to overall min A for LE self care (mod A UB), Supervision with RW for func transfers. Pt remains limited due to the following deficits impacting occupational performance: deconditioning, impaired stand tolerance, impaired standing balance, pain mgmt, dec UE/LE strength, impaired cognition/decreased safety, and Hx of multiple falls and pt self report of fear of falling. Pt demonstrates good rehab potential and would benefit from skilled OT services to reach pt goals of supervision w/ ELOS 2 weeks to return to least restrictive environment pending pt progress with family support and emphasis on caregiver education/resources to decrease pt risk of falls and rehospitalization. Goals for this week: continue to focus on ADL management, func transfers with RW and attention to L body/visual field.     D/C date: 7/21/23 home with supportive family and home OT services      Speech Therapy:  Mode of Communication: Verbal  Cognition: Exceptions to WNL  Cognition: Decreased Memory, Decreased Executive Functions, Decreased Attention, Decreased Comprehension, Decreased Safety  Orientation: Person, Place, Time, Situation  Swallowing: Exceptions to WNL  Swallowing: Oral Dysphagia, Pharyngeal Dysphagia, Esophageal Dysphagia, Aspiration Risk, Reflux Precautions  Diet Recommendations: Level 1/Puree, Amanda Thick  Discharge Recommendations: Home with:  [facility] with[de-identified] 24 Hour Assisteance, 24 Hour Supervision, Family Support, Home Speech Therapy  Pt completed beside swallow assessment on initial eval. Pt known to this unit from previous admission. Pt with baseline dysphagia- diet at home puree and NTL. Currently, pt presenting with  moderate oral and pharyngeal dysphagia. Symptoms or concerns included trace anterior loss of thins, slow manipulation of puree, prolonged mastication of level 2 trials, reduced bolus formation, suspected reduced bolus control, slow and at times labored transfers, suspected pharyngeal swallow delay, suspected reduced hyolaryngeal elevation, secondary swallows, audible swallows and presence of frequent throat clearing and coughing across meal. Recommendations are for cont'd puree and NTL at this time- frequent oral care given mild thrush which is improving. Plan to cont to assess at bedside and possibly complete repeat VFSS as last one (to this department knowledge) was completed on [facility] in March 2023. In regards to cognition, pt with baseline mild cognitive deficits with sundowning behaviors. Pt will cont to benefit from skilled SLP services targeting swallow function for increased independence and decreased burden of care at this time. Update for week of 7/18/2023: Pt conts to be followed for skilled SLP services targeting swallow. Pt currently functioning at mild to moderate oral and pharyngeal dysphagia.  Symptoms or concerns included slow manipulation of puree, reduced bolus formation, suspected reduced bolus control, slow and at times labored transfers, suspected pharyngeal swallow delay, suspected reduced hyolaryngeal elevation, secondary swallows, audible swallows as well as GI s/s present. Discussed held with pt in regards to GI hx- pt recommended follow up with her outpt specialist through [facility], pt does not want to pursue work up during this hospital stay with [facility]. Education started in regards to swallow function and diet recommendations. Folder started with educational handouts, diet education, where to purchase different thickeners as well as recipes for high protein puree options. Plan to cont to trial some softer solid options to allow some options of level 2 items while at home under home SLP supervision/recommendations. Plan is for discharge Friday with home therapy services- pt will benefit from cont'd dysphagia tx to maximize swallow function and tolerate LRD with decreased s/s aspiration at this time. Nursing Notes:  Appetite: Fair  Diet Type: Dysphagia II, Nectar thick liquids                                                                     Pain Location/Orientation: Location: Abdomen  Pain Score: 9                       Hospital Pain Intervention(s): Medication (See MAR), Repositioned          70 y.o. female with medical history of HTN, CVA in 2/2023 on eliquis (chronic dysphagia, L sided weakness, cognitive impairment), hypercalcemia,  CHF, GERD, SHAR, gout, bipolar disorder, RA on plaquenil, chronic opioid dependence on morphine for chronic back/joint pain, who presented to the [address] [place] [address] [address] on 6/30 after a fall, unable to get up and disoriented. She thinks she tripped and fell down her porch steps, landing on he L thigh. Found to have a L lateral thigh hematoma with evidence of extravasation on imaging. Binder placed over hips, and eliquis was held but not reversed given her recent stroke.  Speech consulted and noted oropharyngeal swallow likely at baseline and recommended Level 1/NTL. Recommended for VBS. Course c/b ABLA on 7/2 Hgb dropped to 6.8 and she was given 1 unit pRBC. Geriatrics was consulted, noted at baseline she is oriented but forgetful, with worsening issues since her stroke. Mini-Cog was 9/15 in 2021. They recommended checking B12. They noted she has not been on lorazepam since January, and also recommended weaning down off gabapentin if possible. On 7/4 patient had an unwitnessed fall while trying to reach for her bag on the window sill. No head strike, but landed on her L hip. GCS 15, Neuro exam was baseline. CTA ordered to look for active bleeding, which showed decreased size of hematoma with no active extravasation. Hgb did drop after her transfusion, but seems to have stablized today at 8.5. That being said, her HR was elevated today, and it appears a troponin has been ordered by the trauma team as well as a CXR. CXR showed hazy RLL opacity, likely 2/2 atelectasis, and trops trended down. Metoprolol was increased with improvement. She was admitted to the [address] [place] [address] on 7/7. This week we will continue to monitor labs and vital signs. WE will educate pt/family about repositioning to prevent skin breakdown. We will assist w repositioning and perform routine skin checks. We will monitor for adequate pain control. We will encourage independence with ADLs. We will increase safety awareness and keep pt free from falls. We will monitor I/O to maintain adequate fluid balance. 07/19/23 - Pt continues to require alarms and fall mats for safety. Pt is impulsive at night and forgets to use call bell. We will continues to provide frequent rounding and education to the patient for safety and risk of falls. Pt requires vital sign monitoring and blood work for infection prevention and cardiac history. Pt's pain has been controlled on current regime with exception of some breakthrough pain at night.  Pt requires assistance with ambulating to and from the bathroom and positioning in bed due to her hyperextended neck. Pt has hx of SHAR but does not use a CPAP so 2L of O2 is used at night for comfort. Pt's diet is Dysphagia 2 with  nectar thick liquids and full supervision during meals. Pt is currently NPO for a swallow study scheduled for tomorrow. Pt takes pills whole in pudding. Both legs are wrapped in ACE wraps during the day and pt requires assistance from staff to do this. We will continue to monitor the patient for safety awareness and keep the pt free from falls. Case Management:     Discharge Planning  Living Arrangements: Other (Comment) (Lives w/ son)  Support Systems: Self, Son, Daughter  Assistance Needed: unknown  Type of Current Residence: Other (Comment) (two story home)  Current Home Care Services: No  Pt is making good progress and is expected to return home later this week. Pt will [facility] with [facility] for pt ot and speech services. Family aware of dc plan and have been present for pts therapies. Folloiwng for additional dc needs. Is the patient actively participating in therapies? yes  List any modifications to the treatment plan:     Barriers Interventions   Malnutrition, high aspiration risk Calorie count, modified diet, pt refusing peg   All functional barriers resolved                  Is the patient making expected progress toward goals?  yes  List any update or changes to goals:     Medical Goals: Patient will be medically stable for discharge to [facility] upon completion of rehab program and Patient will be able to manage medical conditions and comorbid conditions with medications and follow up upon completion of rehab program    Weekly Team Goals:   Rehab Team Goals  ADL Team Goal: Patient will require supervision with ADLs with least restrictive device upon completion of rehab program  Transfer Team Goal: Patient will require supervision with transfers with least restrictive device upon completion of rehab program  Locomotion Team Goal: Patient will require supervision with locomotion with least restrictive device upon completion of rehab program  Cognitive Team Goal: Patient will return to premorbid level of cognitive activity upon completion of rehab program    Discussion: pt has made good progress functionally and family training has been conducted. Pt is functionally at her baseline. Esophagram not able to be done, pt refusing a peg tube. Recommendations are to folllow up outpt with gi. Pt will be continuing with [facility] for pt ot and speech services. Anticipated Discharge Date:  7/21/23  [facility] Team Members Present: The following team members are supervising care for this patient and were present during this Weekly Team Conference.     Physician: Dr. Edel Fonseca MD  : MING Leal  Registered Nurse: Michael Ramirez RN  Physical Therapist: ERIK BabbT  Occupational Therapist: Tabitha Gutierrez MS, OTR/L  Speech Therapist: Juanita Kingsley, [facility] [address]

## 2023-07-19 NOTE — PROGRESS NOTES
07/19/23 1330   Pain Assessment   Pain Assessment Tool 0-10   Pain Score No Pain   Cognition   Arousal/Participation Alert; Cooperative   Attention Attends with cues to redirect   Orientation Level Oriented X4   Following Commands Follows one step commands with increased time or repetition   Subjective   Subjective pt agreeable to participate in PT tx. Sit to Lying   Type of Assistance Needed Incidental touching;Verbal cues   Sit to Lying CARE Score 4   Sit to Stand   Type of Assistance Needed Verbal cues; Incidental touching; Adaptive equipment   Comment RW, cues for proper hand placement   Sit to Stand CARE Score 4   Walk 10 Feet   Type of Assistance Needed Adaptive equipment; Incidental touching;Verbal cues   Comment RW   Walk 10 Feet CARE Score 4   Walk 50 Feet with Two Turns   Type of Assistance Needed Verbal cues; Incidental touching; Adaptive equipment   Comment RW   Walk 50 Feet with Two Turns CARE Score 4   Walk 150 Feet   Type of Assistance Needed Verbal cues; Adaptive equipment; Incidental touching   Comment RW   Walk 150 Feet CARE Score 4   Ambulation   Distance Walked (feet) 150 ft  (x 2, 75 ft x 2)   Assist Device Roller Walker   Gait Pattern Inconsistant Amy; Forward Flexion;Narrow AYLA; Improper weight shift   Limitations Noted In Balance; Endurance;Posture;Speed;Strength;Swing;Safety   Provided Assistance with: Balance;Weight Shift   Walk Assist Level Contact Guard   Findings CGA with RW with cues to maintain AYLA within walker to improve upright posture and stability. intermittent cues for increased L sided attention. pt completed ambulatory obstacle course requiring her to navigate around cones and faciltiate direciton change, CGA throughout activity   Does the patient walk? 2. Yes   Curb or Single Stair   Style negotiated Curb   Type of Assistance Needed Incidental touching;Verbal cues; Adaptive equipment   Comment negotiated 6 inch curb step x 2 trials with RW and CGA.  cued pt to get feet closer to step before intiaiting lifting RW onto curb for increased stabilty/safety   1 Step (Curb) CARE Score 4   Toilet Transfer   Type of Assistance Needed Incidental touching;Verbal cues; Adaptive equipment   Comment RW   Toilet Transfer CARE Score 4   Therapeutic Interventions   Strengthening sit < > stands 3 sets of 5 with RW. standing: heel raises, marches 3 sets of 10 with RW   Assessment   Treatment Assessment Pt participated in 60 minute PT tx session focusing on BLE strengthening exercises and functional mobiltiy training. pt requires intermittent cueing for inc safety (ie: hand placement during transfers, increased attention to L side, standing clsoer to sink during hand washing). pt performed mobility with overall CGA using RW. pt will continue to benefit from skilled PT interventions to address deficits, reduce fall risk, and maximize functional independence. Problem List Decreased strength;Decreased range of motion;Decreased endurance; Impaired balance;Decreased mobility; Decreased coordination;Decreased safety awareness; Impaired vision   Barriers to Discharge Inaccessible home environment;Decreased caregiver support   Plan   Treatment/Interventions Functional transfer training;LE strengthening/ROM; Elevations; Therapeutic exercise; Endurance training;Patient/family training;Bed mobility;Gait training   Progress Progressing toward goals   PT Therapy Minutes   PT Time In 1330   PT Time Out 1430   PT Total Time (minutes) 60   PT Mode of treatment - Individual (minutes) 60   PT Mode of treatment - Concurrent (minutes) 0   PT Mode of treatment - Group (minutes) 0   PT Mode of treatment - Co-treat (minutes) 0   PT Mode of Treatment - Total time(minutes) 60 minutes   PT Cumulative Minutes 693   Therapy Time missed   Time missed?  No

## 2023-07-20 LAB
AMORPH URATE CRY URNS QL MICRO: ABNORMAL
ANION GAP SERPL CALCULATED.3IONS-SCNC: 1 MMOL/L
BACTERIA UR QL AUTO: ABNORMAL /HPF
BASOPHILS # BLD AUTO: 0.03 THOUSANDS/ÂΜL (ref 0–0.1)
BASOPHILS NFR BLD AUTO: 1 % (ref 0–1)
BILIRUB UR QL STRIP: NEGATIVE
BUN SERPL-MCNC: 17 MG/DL (ref 5–25)
CALCIUM SERPL-MCNC: 9 MG/DL (ref 8.3–10.1)
CHLORIDE SERPL-SCNC: 111 MMOL/L (ref 96–108)
CLARITY UR: ABNORMAL
CO2 SERPL-SCNC: 28 MMOL/L (ref 21–32)
COLOR UR: YELLOW
CREAT SERPL-MCNC: 0.89 MG/DL (ref 0.6–1.3)
EOSINOPHIL # BLD AUTO: 0 THOUSAND/ÂΜL (ref 0–0.61)
EOSINOPHIL NFR BLD AUTO: 0 % (ref 0–6)
ERYTHROCYTE [DISTWIDTH] IN BLOOD BY AUTOMATED COUNT: 20.4 % (ref 11.6–15.1)
GFR SERPL CREATININE-BSD FRML MDRD: 66 ML/MIN/1.73SQ M
GLUCOSE P FAST SERPL-MCNC: 84 MG/DL (ref 65–99)
GLUCOSE SERPL-MCNC: 84 MG/DL (ref 65–140)
GLUCOSE UR STRIP-MCNC: NEGATIVE MG/DL
HCT VFR BLD AUTO: 36.2 % (ref 34.8–46.1)
HGB BLD-MCNC: 10.9 G/DL (ref 11.5–15.4)
HGB UR QL STRIP.AUTO: NEGATIVE
HYALINE CASTS #/AREA URNS LPF: ABNORMAL /LPF
IMM GRANULOCYTES # BLD AUTO: 0.02 THOUSAND/UL (ref 0–0.2)
IMM GRANULOCYTES NFR BLD AUTO: 0 % (ref 0–2)
KETONES UR STRIP-MCNC: NEGATIVE MG/DL
LEUKOCYTE ESTERASE UR QL STRIP: ABNORMAL
LYMPHOCYTES # BLD AUTO: 1.09 THOUSANDS/ÂΜL (ref 0.6–4.47)
LYMPHOCYTES NFR BLD AUTO: 23 % (ref 14–44)
MCH RBC QN AUTO: 31 PG (ref 26.8–34.3)
MCHC RBC AUTO-ENTMCNC: 30.1 G/DL (ref 31.4–37.4)
MCV RBC AUTO: 103 FL (ref 82–98)
MONOCYTES # BLD AUTO: 0.38 THOUSAND/ÂΜL (ref 0.17–1.22)
MONOCYTES NFR BLD AUTO: 8 % (ref 4–12)
NEUTROPHILS # BLD AUTO: 3.26 THOUSANDS/ÂΜL (ref 1.85–7.62)
NEUTS SEG NFR BLD AUTO: 68 % (ref 43–75)
NITRITE UR QL STRIP: NEGATIVE
NON-SQ EPI CELLS URNS QL MICRO: ABNORMAL /HPF
NRBC BLD AUTO-RTO: 0 /100 WBCS
PH UR STRIP.AUTO: 7 [PH]
PLATELET # BLD AUTO: 197 THOUSANDS/UL (ref 149–390)
PMV BLD AUTO: 10.9 FL (ref 8.9–12.7)
POTASSIUM SERPL-SCNC: 4.1 MMOL/L (ref 3.5–5.3)
PROT UR STRIP-MCNC: ABNORMAL MG/DL
RBC # BLD AUTO: 3.52 MILLION/UL (ref 3.81–5.12)
RBC #/AREA URNS AUTO: ABNORMAL /HPF
SODIUM SERPL-SCNC: 140 MMOL/L (ref 135–147)
SP GR UR STRIP.AUTO: 1.01 (ref 1–1.03)
UROBILINOGEN UR STRIP-ACNC: <2 MG/DL
WBC # BLD AUTO: 4.78 THOUSAND/UL (ref 4.31–10.16)
WBC #/AREA URNS AUTO: ABNORMAL /HPF

## 2023-07-20 PROCEDURE — 97110 THERAPEUTIC EXERCISES: CPT

## 2023-07-20 PROCEDURE — 99233 SBSQ HOSP IP/OBS HIGH 50: CPT

## 2023-07-20 PROCEDURE — 87186 SC STD MICRODIL/AGAR DIL: CPT

## 2023-07-20 PROCEDURE — 85025 COMPLETE CBC W/AUTO DIFF WBC: CPT | Performed by: NURSE PRACTITIONER

## 2023-07-20 PROCEDURE — 97530 THERAPEUTIC ACTIVITIES: CPT

## 2023-07-20 PROCEDURE — 81001 URINALYSIS AUTO W/SCOPE: CPT

## 2023-07-20 PROCEDURE — 99232 SBSQ HOSP IP/OBS MODERATE 35: CPT | Performed by: INTERNAL MEDICINE

## 2023-07-20 PROCEDURE — 80048 BASIC METABOLIC PNL TOTAL CA: CPT | Performed by: NURSE PRACTITIONER

## 2023-07-20 PROCEDURE — 92526 ORAL FUNCTION THERAPY: CPT

## 2023-07-20 PROCEDURE — 87086 URINE CULTURE/COLONY COUNT: CPT

## 2023-07-20 PROCEDURE — 87077 CULTURE AEROBIC IDENTIFY: CPT

## 2023-07-20 PROCEDURE — 97535 SELF CARE MNGMENT TRAINING: CPT

## 2023-07-20 PROCEDURE — 87147 CULTURE TYPE IMMUNOLOGIC: CPT

## 2023-07-20 RX ORDER — LANOLIN ALCOHOL/MO/W.PET/CERES
CREAM (GRAM) TOPICAL
Qty: 30 TABLET | Refills: 0 | OUTPATIENT
Start: 2023-07-20

## 2023-07-20 RX ADMIN — BUPROPION HYDROCHLORIDE 100 MG: 100 TABLET, FILM COATED ORAL at 17:06

## 2023-07-20 RX ADMIN — DULOXETINE HYDROCHLORIDE 60 MG: 60 CAPSULE, DELAYED RELEASE ORAL at 17:06

## 2023-07-20 RX ADMIN — SIMETHICONE 80 MG: 80 TABLET, CHEWABLE ORAL at 17:07

## 2023-07-20 RX ADMIN — HYDROXYCHLOROQUINE SULFATE 200 MG: 200 TABLET ORAL at 06:52

## 2023-07-20 RX ADMIN — CINACALCET 30 MG: 30 TABLET, FILM COATED ORAL at 06:52

## 2023-07-20 RX ADMIN — SIMETHICONE 80 MG: 80 TABLET, CHEWABLE ORAL at 21:27

## 2023-07-20 RX ADMIN — MAGNESIUM OXIDE TAB 400 MG (241.3 MG ELEMENTAL MG) 400 MG: 400 (241.3 MG) TAB at 17:07

## 2023-07-20 RX ADMIN — SIMETHICONE 80 MG: 80 TABLET, CHEWABLE ORAL at 06:51

## 2023-07-20 RX ADMIN — PANTOPRAZOLE SODIUM 40 MG: 40 TABLET, DELAYED RELEASE ORAL at 06:51

## 2023-07-20 RX ADMIN — HYDROXYCHLOROQUINE SULFATE 200 MG: 200 TABLET ORAL at 17:06

## 2023-07-20 RX ADMIN — ACETAMINOPHEN 650 MG: 325 TABLET, FILM COATED ORAL at 17:07

## 2023-07-20 RX ADMIN — MELATONIN TAB 3 MG 3 MG: 3 TAB at 21:27

## 2023-07-20 RX ADMIN — ACETAMINOPHEN 650 MG: 325 TABLET, FILM COATED ORAL at 11:48

## 2023-07-20 RX ADMIN — ACETAMINOPHEN 650 MG: 325 TABLET, FILM COATED ORAL at 06:51

## 2023-07-20 RX ADMIN — MORPHINE SULFATE 7.5 MG: 15 TABLET ORAL at 14:48

## 2023-07-20 RX ADMIN — MORPHINE SULFATE 7.5 MG: 15 TABLET ORAL at 08:18

## 2023-07-20 RX ADMIN — LAMOTRIGINE 150 MG: 100 TABLET ORAL at 21:27

## 2023-07-20 RX ADMIN — APIXABAN 5 MG: 5 TABLET, FILM COATED ORAL at 08:19

## 2023-07-20 RX ADMIN — GABAPENTIN 400 MG: 400 CAPSULE ORAL at 17:07

## 2023-07-20 RX ADMIN — GABAPENTIN 400 MG: 400 CAPSULE ORAL at 08:19

## 2023-07-20 RX ADMIN — LIDOCAINE 5% 1 PATCH: 700 PATCH TOPICAL at 08:22

## 2023-07-20 RX ADMIN — MAGNESIUM OXIDE TAB 400 MG (241.3 MG ELEMENTAL MG) 400 MG: 400 (241.3 MG) TAB at 08:19

## 2023-07-20 RX ADMIN — APIXABAN 5 MG: 5 TABLET, FILM COATED ORAL at 17:07

## 2023-07-20 RX ADMIN — DULOXETINE HYDROCHLORIDE 60 MG: 60 CAPSULE, DELAYED RELEASE ORAL at 08:19

## 2023-07-20 RX ADMIN — BUPROPION HYDROCHLORIDE 100 MG: 100 TABLET, FILM COATED ORAL at 08:23

## 2023-07-20 RX ADMIN — SIMETHICONE 80 MG: 80 TABLET, CHEWABLE ORAL at 11:48

## 2023-07-20 NOTE — PROGRESS NOTES
OT Daily Treatment Note       07/20/23 0700   Pain Assessment   Pain Assessment Tool 0-10   Pain Score 8   Pain Location/Orientation Orientation: Mid;Location: Back   Hospital Pain Intervention(s) Repositioned   Restrictions/Precautions   Precautions Aspiration;Bed/chair alarms;Cognitive; Fall Risk;Pain;Supervision on toilet/commode;Visual deficit   Lifestyle   Autonomy "This back pain is worse and new"   Oral Hygiene   Type of Assistance Needed Set-up / clean-up   Physical Assistance Level No physical assistance   Comment seated in WC at sink   Oral Hygiene CARE Score 5   Grooming   Findings S/U A for all grooming tasks while seated in w/c at sink   Shower/Bathe Self   Type of Assistance Needed Physical assistance   Physical Assistance Level 25% or less   Comment SB completed at EOB 2* inc pain and pt declining to shower. req assistance with posterior bathing today 2* pain.    Shower/Bathe Self CARE Score 3   Bathing   Assessed Bath Style Sponge Bath   Tub/Shower Transfer   Reason Not Assessed Sponge Bath   Upper Body Dressing   Type of Assistance Needed Physical assistance   Physical Assistance Level 26%-50%   Comment assist for partially threading LUE through shirt and thoroughly down back   Upper Body Dressing CARE Score 3   Lower Body Dressing   Type of Assistance Needed Physical assistance   Physical Assistance Level 26%-50%   Comment seated EOB; A to thread LLE and throughly don over hips posteriorly   Lower Body Dressing CARE Score 3   Putting On/Taking Off Footwear   Type of Assistance Needed Physical assistance   Physical Assistance Level 51%-75%   Comment pt cont to require A to don B/L socks 2* LUE weakness, pt able to doff socks with R hand   Putting On/Taking Off Footwear CARE Score 2   Sit to Lying   Type of Assistance Needed Physical assistance   Physical Assistance Level 25% or less   Comment Assist for LLE mgmt 2* pain   Sit to Lying CARE Score 3   Lying to Sitting on Side of Bed   Type of Assistance Needed Supervision   Physical Assistance Level No physical assistance   Comment inc time required   Lying to Sitting on Side of Bed CARE Score 4   Sit to Stand   Type of Assistance Needed Incidental touching   Physical Assistance Level No physical assistance   Comment CG and inc time 2* new onset worsening back pain   Sit to Stand CARE Score 4   Bed-Chair Transfer   Type of Assistance Needed Incidental touching   Physical Assistance Level No physical assistance   Comment CG SPT with RW   Chair/Bed-to-Chair Transfer CARE Score 4   Therapeutic Exercise - ROM   UE-ROM Yes  (provided pt with back stretching exercises for pain mgmt along with edu re: back pain and body mechanics/methods of completing ADLs to decrease overall back pain. no further questions at this time. see below for details.)   Cognition   Overall Cognitive Status Impaired   Arousal/Participation Alert; Cooperative   Attention Attends with cues to redirect   Orientation Level Oriented X4   Memory Decreased short term memory;Decreased recall of recent events;Decreased recall of precautions   Following Commands Follows one step commands with increased time or repetition   Comments pt now with 1:1 video monitoring s/p fall yesterday PM   Activity Tolerance   Activity Tolerance Patient limited by pain   Assessment   Treatment Assessment Pt participated in skilled OT session with focus on ADL retraining, functional transfer training, compensatory technique education, continued education, energy conservation and activity engagement . Pt is planned for DC tomorrow which is pending pts medical status s/p fall that occurred yesterday afternoon with inc pain in mid back. Pt has made good  progress during time of stay at the [facility]. Pt is overall functioning at Partial/moderate assistance  for ADLs, TOUCHING/ STEADYING assistance  for functional transfers and TOUCHING/ STEADYING assistance  for functional mobility. Prior to [facility], DME recommendations include RW.  DME was ordered to maximize functional independence and decrease fall risk. Based on pts functional performance, pt is safe to [address] [place] [address] w/ recommendations noted above. Pt would benefit from continued OT services in home health to maximize functional abilities. Recommendation   OT Discharge Recommendation Home with home health rehabilitation  (questioning DC tmrw vs later 2* fall with inc pain that occured yesterday PM)   OT Therapy Minutes   OT Time In 0700   OT Time Out 0830   OT Total Time (minutes) 90   OT Mode of treatment - Individual (minutes) 90   OT Mode of treatment - Concurrent (minutes) 0   OT Mode of treatment - Group (minutes) 0   OT Mode of treatment - Co-treat (minutes) 0   OT Mode of Treatment - Total time(minutes) 90 minutes   OT Cumulative Minutes 998   Therapy Time missed   Time missed? No     Access Code: [email or web address]  URL: [email or web address]  Date: 07/20/2023  Prepared by: Patrick hSay    Patient Education  - Managing Back Pain    Access Code: [email or web address]  URL: [email or web address]  Date: 07/20/2023  Prepared by: Patrick Shay    Exercises  - Seated Alternating Side Stretch with Arm Overhead  - 1 x daily - 7 x weekly - 3 sets - 10 reps  - Forward Pressure Relief  - 1 x daily - 7 x weekly - 3 sets - 10 reps  - Seated Mid Back Stretch  - 1 x daily - 7 x weekly - 3 sets - 10 reps  - Seated Rhomboid Stretch  - 1 x daily - 7 x weekly - 3 sets - 10 reps  - Seated Side Stretch  - 1 x daily - 7 x weekly - 3 sets - 10 reps  - Slump Stretch  - 1 x daily - 7 x weekly - 3 sets - 10 reps

## 2023-07-20 NOTE — PROGRESS NOTES
PM&R PROGRESS NOTE:  Juana Perez 70 y.o. female MRN: [id number]  Unit/Bed#: -01 Encounter: [id number]    Rehabilitation Diagnosis: Impairment of mobility, safety, Activities of Daily Living (ADLs), and cognitive/communication skills due to Other Disabling Impairments:  13  Other Disabling Impairments    HPI: Juana Perez is a 70 y.o. female with medical history of HTN, CVA in 2/2023 on eliquis (chronic dysphagia, L sided weakness, cognitive impairment), hypercalcemia,  CHF, GERD, SHAR, gout, bipolar disorder, RA on plaquenil, chronic opioid dependence on morphine for chronic back/joint pain, who presented to the [address] [place] [address] [address] on 6/30 after a fall, unable to get up and disoriented. She thinks she tripped and fell down her porch steps, landing on he L thigh. Found to have a L lateral thigh hematoma with evidence of extravasation on imaging. Binder placed over hips, and eliquis was held but not reversed given her recent stroke. Speech consulted and noted oropharyngeal swallow likely at baseline and recommended Level 1/NTL. Recommended for VBS. Course c/b ABLA on 7/2 Hgb dropped to 6.8 and she was given 1 unit pRBC. Geriatrics was consulted, noted at baseline she is oriented but forgetful, with worsening issues since her stroke. Mini-Cog was 9/15 in 2021. They recommended checking B12. They noted she has not been on lorazepam since January, and also recommended weaning down off gabapentin if possible. On 7/4 patient had an unwitnessed fall while trying to reach for her bag on the window sill. No head strike, but landed on her L hip. GCS 15, Neuro exam was baseline. CTA ordered to look for active bleeding, which showed decreased size of hematoma with no active extravasation. Hgb did drop after her transfusion, but seems to have stablized today at 8.5.  That being said, her HR was elevated today, and it appears a troponin has been ordered by the trauma team as well as a CXR. CXR showed hazy RLL opacity, likely 2/2 atelectasis, and trops trended down. Metoprolol was increased with improvement. She was admitted to the [facility] on 7/7. SUBJECTIVE: Patient seen face to face in wheelchair. No acute issues overnight. Patient reports increased coccyx pain since fall onto buttocks yesterday afternoon. Imaging showed no acute osseous abnormalities. Chronic pain stable, current acute pain level 6/10. Pain regimen increased to three times a day prn s/p fall. There is a decline in functional status and therapy recommending discharge be postponed until 7/25 with reassessment on Monday 7/25. Patient in agreement. Dr. Shana Nuno spoke with daughter, Radha Hassan. She will have family training on Monday 7/25. Poor meal completion, voiding- reports burning with urination, LBM 7/17. Denies chest pain, shortness of breath, fever, chills, N/V, abdominal pain. ASSESSMENT: Stable, progressing    PLAN:  - poor oral intake: continue calorie count, encourage meal completion and fluid intake. Dietary supplementation between meals, Nutrition following.  - acute pain: continue MSIR 7.5 mg TID prn, Voltaren gel  - anemia: Hbg 10.9 (previously 9.9) continue to monitor  - dysuria: UA with reflex: awaiting collection  - constipation: LBM 7/17- Miralax prn    Rehabilitation  • Functional deficits:  cognitive impairment, self-care, impaired mobility  • Continue current rehabilitation plan of care to maximize function.    • Functional update:   • PT: mobility- supervision, transfers- supervision, ambulation- supervision, stairs BHR 6  • OT: bathing- incidental touching, dressing- UB mod A LB min A, toileting- min A  • SLP: dysphagia puree/NTL, impaired cognition, decreased memory, decreased executive function, decreased insight and attention  • Estimated Discharge: anticipated 7/25 with home PT/OT    Pain  • Tylenol 975 mg every 8 hours  • Lidocaine patch  • Gabapentin 400 mg BID  • Diclofenac Sodium topical gel QID prn  • Oxycodone 2.5-5 mg every 4 hours prn    DVT prophylaxis  • Eliquis    Bladder plan  • Continent    Bowel plan  • Continent  • Miralax daily prn      * Leg hematoma, left, initial encounter  Assessment & Plan  2/2 fall    6/30 CT CAP:  Large hematoma in the lateral left thigh measuring up to 9.6 cm with focus of active contrast extravasation. No acute intrathoracic or intra-abdominal injury. 7/4 CTA Pelvis:  1. Decreasing size of left lateral thigh hematoma without evidence for active extravasation. 2.  No CT findings in the visualized pelvis and thighs to explain falling hematocrit. 3.  Pancolitis with increasing inflammatory free fluid in the pelvis. No free air, pneumatosis, or encapsulated collections demonstrated. Non-operatively managed. Did require transfusion x1  Pain management as below  Monitor Hgb. PT/OT 3-5 hours/day, 5-7 days/week  Outpatient f/u with General Surgery      History of stroke  Assessment & Plan  MRI brain 2/22 - 1.  New small acute /more recent interval lacunar infarction in the right cerebral peduncle (series 4, image 14). 2.  Extensive multifocal large, previously present right middle cerebral artery infarction which is evolving with superimposed areas of evolving hemorrhage likely related to hemorrhagic transformation of subacute right MCA infarction.  No significant   mass effect. 3.  Mild, chronic microangiopathy. S/P thrombectomy 2/12 by Dr Peggy Jones   Secondary stroke prophylaxis at this time with Eliquis 5 mg twice daily   PT, OT, SLP.    Review modifiable risk factors and provide stroke education  Monitor for poststroke pain  Eliquis for secondary stroke prophylaxis    Outpatient f/u with Neurology    CHF (congestive heart failure) ([address])  Assessment & Plan  Wt Readings from Last 3 Encounters:   07/20/23 51.6 kg (113 lb 12.1 oz)   06/30/23 55.3 kg (122 lb)   06/06/23 47.9 kg (105 lb 9.6 oz)     2/2023 Echo 30% with G2DD, moderate AI  Home: Toprol XL 25mg daily, Aldactone 25mg daily   - Limited by BP - no ACE/ARB   - no SGLT2i due to recurrent UTI  Here: Toprol XL 25mg BID, spironolactone 25mg daily   Unclear etiology of cardiomyopathy  Possible consideration for ischemic work-up outpatient, poor candidate for aggressive procedures   Monitor I/O/daily weights, volume status, lytes. IM consulted to assist with management  Outpatient f/u with Dr. Ismael Cardenas      Dysphagia  Assessment & Plan  2/2 previous CVA earlier this year. Noted to have impaired oropharyngeal swallow  Was on free water protocol at home - holding for now due to thrush  On Level 1/NTL  - Inpatient SLP recommended VBS  - Consulting SLP here  - Aspiration precautions  - Strict oral hygiene prior to trials  - esophagogram scheduled for 7/19, unable to complete d/t Aspiration risk    Urinary tract infection without hematuria  Assessment & Plan  Present on admission  With incontinence and confusion  Grew 100k CFU Enterococcus    - Started on Macrobid based on antibiogram on 7/9   - sensitivity confirms susceptibility  Macrobid complete, monitor for signs of reoccurrence  - 7/20 reports burning with urination, awaiting UA w/reflex     Mixed stress and urge urinary incontinence  Assessment & Plan  Frankly incontinent here  Initiate timed voids and scans  Has history of recurrent UTIs - will check UA on admission here given extent of incontinence   - Had confusion on 7/8-7/9 overnight. - See UTI for details. Close continence care      Anemia  Assessment & Plan  2/2 L leg hematoma (most recent imaging negative for extravasation)  At home on eliquis  No reversal inpatient due to recent CVA  Prior to admission Hgb 10-12. Ravi 7/2 6.8 - given 1 unit pRBC  7/8 Hgb 8.5 > 9.2 > 9.3 > 10.1 > 9.9 > 10.9  Monitor, transfuse as appropriate.   IM consulted to assist with management    Cervical dystonia  Assessment & Plan  Recommend outpatient f/u with Dr. Malia Cardoso to evaluate for botulinum toxin injections  Appears to have a retrocollis. Sundowning  Assessment & Plan  Does wake up confused at night, but is easily redirectable  - More confusion, than agitation  - Wandered all the way down to the cafeteria on the 1st floor on 7/9  - No injuries  - Brought back to her room  - UCx with 100k enterococcus. - treat UTI  - Low bed, Q15hr checks overnight, Sleep logs  - Floor mats. - Delirium precautions    Night terrors  Assessment & Plan  Home: Prazosin  Here: none  Continue melatonin  Outpatient f/u with PCP    Thrush  Assessment & Plan  Noted on admission  7/7 started on nystatin swish/spit  - 7/11 improving  - Continue for at least 7 days with 2-3 days after her antibiotics stop  - completed, resolved    Hypomagnesemia  Assessment & Plan  Home: Mag oxide 800mg BID  Here: Mag oxide 400mg BID  7/9 Mag 1.9   Goals > 2. Sinus tachycardia  Assessment & Plan  Home: Toprol XL 25mg BID  Here: Same  On 7/6 with HR in 115-120. EKG showed sinus tach. Trops trended and negative. - Associated with transient/mild SOB   - She was only being given Toprol daily in the hospital   - Increased to BID with improvement  Lytes with K > 4, Mag > 2  Monitor and adjust regimen as appropriate  IM consulted to assist with management  Outpatient f/u with Dr. Martina Chanel    Obstructive sleep apnea  Assessment & Plan  Previously non-compliant with hospital or home CPAP  Outpatient f/u with PCP. Hypercalcemia  Assessment & Plan  Suspect 2/2 primary hyperparathyroidism  7/8 iCal 1.12, Phos 3.3  Home: Sensipar 30mg daily  Here: Same  Recommend outpatient f/u with Endocrinology      HTN (hypertension)  Assessment & Plan  Tends to run on the low/normal side. Home: Toprol XL 25mg daily, Spironolactone 25mg daily  Here: Same  Has not been able to get meds at times due to not meeting parameters.    Monitor and adjust as appropriate  - Spironolactone held 7/15 and d/c 7/16  IM consulted to assist with management    Bipolar depression [facility])  Assessment & Plan  Home: Bupropion 100mg BID, Cymbalta 60mg BID, Ativan 0.25mg BID PRN (however this has not been filled since January 2023 as per PDMP)  Here: Bupropion 100mg BID, Cymbalta 60mg BID  Consulting rehab psychology for support  Adjust as appropriate  Outpatient f/u with PCP    Severe protein-calorie malnutrition ([address])  Assessment & Plan  Malnutrition Findings:                                 BMI Findings: Body mass index is 20.89 kg/m². Consulted nutrition    Fall  Assessment & Plan  - s/p slip fall onto buttocks 7/19  - did not ask for assistance  - imaging showed no acute osseous abnormalities  - acute coccyx pain   - MSIR 7.5 mg TID  - Voltaren gel prn    Osteoporosis  Assessment & Plan  Hx L1 compression fx s/p kyphoplasty  Was previously on Prolia - last given in 2020   - Held due to dental issues  Recommend outpatient f/u with Endocrinology  2/23 Vitamin D level is 41.7    Gastroesophageal reflux disease without esophagitis  Assessment & Plan  Home: PPI daily  Here: PPI daily    Seronegative arthropathy of multiple sites [facility])  Assessment & Plan  Follows with Dr. Joycelyn Bledsoe with Rheum and is on Hydroxychloroquine 200mg BID. Continue home regimen and follow-up with rheumatology on discharge    Mild intermittent asthma without complication  Assessment & Plan  No acute exacerbation  Home: Albuterol PRN  Here: Same    Chronic pain syndrome  Assessment & Plan  Joint and back pain  Follows with Dr. Sherin Manzanares with [facility]  Reviewed PDMP - at home on MSIR 7.5mg BID PRN. Here: MSIR 7.5mg BID PRN  - Outpatient f/u with Dr. Sherin Manzanares. Appreciate IM consultants medical co-management. Personally reviewed labs, medications and imaging. ROS:  Review of Systems   A 10 point review of systems was negative except for what is noted in the HPI.     OBJECTIVE:   /64 (BP Location: Left arm)   Pulse 80   Temp 98.5 °F (36.9 °C) (Oral)   Resp 20   Ht 5' 3" (1.6 m)   Wt 51.6 kg (113 lb 12.1 oz)   SpO2 97%   BMI 20.15 kg/m²     Physical Exam  Constitutional:       Appearance: Normal appearance. HENT:      Head: Normocephalic and atraumatic. Nose: Nose normal.      Mouth/Throat:      Mouth: Mucous membranes are moist.   Cardiovascular:      Rate and Rhythm: Normal rate. Rhythm irregular. Pulses: Normal pulses. Pulmonary:      Effort: Pulmonary effort is normal.      Breath sounds: Normal breath sounds. Abdominal:      General: Bowel sounds are normal.      Palpations: Abdomen is soft. Musculoskeletal:         General: Tenderness (coccyx) and deformity (Kyphosis) present. Normal range of motion. Cervical back: Normal range of motion. Skin:     General: Skin is warm and dry. Capillary Refill: Capillary refill takes less than 2 seconds. Findings: Bruising present. Neurological:      Mental Status: She is alert and oriented to person, place, and time. Motor: Weakness present.       Gait: Gait abnormal.   Psychiatric:         Mood and Affect: Mood normal.         Judgment: Judgment normal.        Lab Results   Component Value Date    WBC 4.78 07/20/2023    HGB 10.9 (L) 07/20/2023    HCT 36.2 07/20/2023     (H) 07/20/2023     07/20/2023     Lab Results   Component Value Date    SODIUM 140 07/20/2023    K 4.1 07/20/2023     (H) 07/20/2023    CO2 28 07/20/2023    BUN 17 07/20/2023    CREATININE 0.89 07/20/2023    GLUC 84 07/20/2023    CALCIUM 9.0 07/20/2023     Lab Results   Component Value Date    INR 1.33 (H) 07/01/2023    INR 1.65 (H) 06/30/2023    INR 1.66 (H) 04/29/2023    PROTIME 16.7 (H) 07/01/2023    PROTIME 19.5 (H) 06/30/2023    PROTIME 19.9 (H) 04/29/2023           Current Facility-Administered Medications:   •  acetaminophen (TYLENOL) tablet 650 mg, 650 mg, Oral, Q6H [address], Awa Chávez MD, 650 mg at 07/20/23 1148  •  albuterol (PROVENTIL HFA,VENTOLIN HFA) inhaler 2 puff, 2 puff, Inhalation, Q6H PRN, Awa Chávez MD, 2 puff at 07/15/23 1606  •  apixaban (ELIQUIS) tablet 5 mg, 5 mg, Oral, BID, Lula Lacy MD, 5 mg at 07/20/23 4018  •  buPROPion [facility]) tablet 100 mg, 100 mg, Oral, BID, Lula Lacy MD, 100 mg at 07/20/23 3714  •  cinacalcet (SENSIPAR) tablet 30 mg, 30 mg, Oral, Daily With Breakfast, Lula Lacy MD, 30 mg at 07/20/23 1614  •  Diclofenac Sodium (VOLTAREN) 1 % topical gel 2 g, 2 g, Topical, 4x Daily PRN, Lula Lacy MD  •  DULoxetine (CYMBALTA) delayed release capsule 60 mg, 60 mg, Oral, BID, Lula Lacy MD, 60 mg at 07/20/23 9349  •  gabapentin (NEURONTIN) capsule 400 mg, 400 mg, Oral, BID, Lula Lacy MD, 400 mg at 07/20/23 3443  •  hydroxychloroquine (PLAQUENIL) tablet 200 mg, 200 mg, Oral, BID With Meals, Lula Lacy MD, 200 mg at 07/20/23 2798  •  lamoTRIgine (LaMICtal) tablet 150 mg, 150 mg, Oral, HS, Lula Lacy MD, 150 mg at 07/19/23 2111  •  lidocaine (LIDODERM) 5 % patch 1 patch, 1 patch, Topical, Daily, LAURA Rutledge, 1 patch at 07/20/23 9065  •  magnesium Oxide (MAG-OX) tablet 400 mg, 400 mg, Oral, BID, Lula Lacy MD, 400 mg at 07/20/23 2068  •  melatonin tablet 3 mg, 3 mg, Oral, HS, Lula Lacy MD, 3 mg at 07/19/23 2111  •  metoprolol succinate (TOPROL-XL) 24 hr tablet 25 mg, 25 mg, Oral, Daily, LAURA Doe, 25 mg at 07/19/23 1141  •  morphine (MSIR) IR tablet 7.5 mg, 7.5 mg, Oral, TID PRN, Bryant Eagle MD, 7.5 mg at 07/20/23 0818  •  ondansetron (ZOFRAN-ODT) dispersible tablet 4 mg, 4 mg, Oral, Q6H PRN, Lula Lacy MD, 4 mg at 07/19/23 2242  •  pantoprazole (PROTONIX) EC tablet 40 mg, 40 mg, Oral, Early Morning, Lula Lacy MD, 40 mg at 07/20/23 1410  •  polyethylene glycol (MIRALAX) packet 17 g, 17 g, Oral, Daily PRN, LAURA Rutledge, 17 g at 07/17/23 0934  •  simethicone (MYLICON) chewable tablet 80 mg, 80 mg, Oral, 4x Daily (with meals and at bedtime), LAURA De Los Santos, 80 mg at 07/20/23 1148    Past Medical History:   Diagnosis Date   • Anxiety    • Asthma • Bipolar depression (HCC)    • Chronic narcotic dependence (HCC)    • Chronic pain    • Depression    • Enterovirus heart infection    • SHAR (obstructive sleep apnea)    • Osteoarthritis    • Peripheral neuropathy    • Plantar fasciitis of right foot    • Senile osteoporosis    • Seronegative arthropathy of multiple sites [facility])    • Undifferentiated connective tissue disease ([address])        Patient Active Problem List    Diagnosis Date Noted   • Leg hematoma, left, initial encounter 07/01/2023   • History of stroke 02/28/2023   • CHF (congestive heart failure) ([address])    • Dysphagia 03/09/2023   • Urinary tract infection without hematuria 03/06/2023   • Mixed stress and urge urinary incontinence    • Anemia 06/26/2021   • Cervical dystonia 07/11/2023   • Sundowning 07/09/2023   • Thrush 07/07/2023   • Night terrors 07/07/2023   • Cerebral aneurysm, nonruptured 05/15/2023   • Nocturnal hypoxia 05/01/2023   • Stroke-like symptoms 05/01/2023   • Insomnia 04/03/2023   • Hypomagnesemia 03/30/2023   • Sinus tachycardia 03/29/2023   • Hypokalemia 03/10/2023   • AMS (altered mental status) 03/09/2023   • Gout of foot 03/09/2023   • Wound of left ankle 03/09/2023   • Obstructive sleep apnea 02/28/2023   • History of bacteremia 02/24/2023   • Acute CVA (cerebrovascular accident) ([address]) 02/23/2023   • Hypercalcemia 02/22/2023   • Discoloration of skin of foot 02/22/2023   • Abdominal pain 02/22/2023   • HFrEF (heart failure with reduced ejection fraction) ([address]) 02/13/2023   • Stroke ([address]) 02/12/2023   • HTN (hypertension) 02/12/2023   • Cardiomyopathy ([address]) 02/07/2023   • Vitamin D insufficiency 02/07/2023   • LAILA (acute kidney injury) ([address]) 04/23/2022   • Polypharmacy 04/23/2022   • 'light-for-dates' infant with signs of fetal malnutrition 04/22/2022   • Bipolar depression ([address]) 03/04/2022   • Rheumatoid arthritis of multiple sites with negative rheumatoid factor ([address]) 03/04/2022   • Closed fracture of multiple ribs of right side 03/02/2022   • Traumatic pneumothorax 03/02/2022   • Closed fracture of transverse process of lumbar vertebra ([address]) 03/02/2022   • Liver contusion 03/02/2022   • Fall 03/02/2022   • Acute pain due to trauma 03/02/2022   • Right shoulder pain 03/02/2022   • Severe protein-calorie malnutrition ([address]) 03/02/2022   • Undifferentiated connective tissue disease ([address]) 12/29/2021   • Primary generalized (osteo)arthritis 12/29/2021   • Peripheral neuropathy 12/29/2021   • Osteoporosis 12/29/2021   • Hx of compression fracture of spine 12/29/2021   • Lumbar spondylosis 12/29/2021   • Cervical spondylosis 12/29/2021   • Chronic pain syndrome    • Mild intermittent asthma without complication    • Ambulatory dysfunction    • Seronegative arthropathy of multiple sites ([address])    • Gastroesophageal reflux disease without esophagitis         LAURA Castaneda  Physical [facility] and [place]

## 2023-07-20 NOTE — PLAN OF CARE
Problem: PAIN - ADULT  Goal: Verbalizes/displays adequate comfort level or baseline comfort level  Description: Interventions:  - Encourage patient to monitor pain and request assistance  - Assess pain using appropriate pain scale  - Administer analgesics based on type and severity of pain and evaluate response  - Implement non-pharmacological measures as appropriate and evaluate response  - Consider cultural and social influences on pain and pain management  - Notify physician/advanced practitioner if interventions unsuccessful or patient reports new pain  Outcome: Progressing     Problem: INFECTION - ADULT  Goal: Absence or prevention of progression during hospitalization  Description: INTERVENTIONS:  - Assess and monitor for signs and symptoms of infection  - Monitor lab/diagnostic results  - Monitor all insertion sites, i.e. indwelling lines, tubes, and drains  - Monitor endotracheal if appropriate and nasal secretions for changes in amount and color  - [place] appropriate cooling/warming therapies per order  - Administer medications as ordered  - Instruct and encourage patient and family to use good hand hygiene technique  - Identify and instruct in appropriate isolation precautions for identified infection/condition  Outcome: Progressing  Goal: Absence of fever/infection during neutropenic period  Description: INTERVENTIONS:  - Monitor WBC    Outcome: Progressing     Problem: SAFETY ADULT  Goal: Patient will remain free of falls  Description: INTERVENTIONS:  - Educate patient/family on patient safety including physical limitations  - Instruct patient to call for assistance with activity   - Consult OT/PT to assist with strengthening/mobility   - Keep Call bell within reach  - Keep bed low and locked with side rails adjusted as appropriate  - Keep care items and personal belongings within reach  - Initiate and maintain comfort rounds  - Make Fall Risk Sign visible to staff  - Offer Toileting every 2-4 Hours, in advance of need  - Initiate/Maintain bed alarm  - Obtain necessary fall risk management equipment: nonskid socks   - Apply yellow socks and bracelet for high fall risk patients  - Consider moving patient to room near nurses station  Outcome: Progressing  Goal: Maintain or return to baseline ADL function  Description: INTERVENTIONS:  -  Assess patient's ability to carry out ADLs; assess patient's baseline for ADL function and identify physical deficits which impact ability to perform ADLs (bathing, care of mouth/teeth, toileting, grooming, dressing, etc.)  - Assess/evaluate cause of self-care deficits   - Assess range of motion  - Assess patient's mobility; develop plan if impaired  - Assess patient's need for assistive devices and provide as appropriate  - Encourage maximum independence but intervene and supervise when necessary  - Involve family in performance of ADLs  - Assess for home care needs following discharge   - Consider OT consult to assist with ADL evaluation and planning for discharge  - Provide patient education as appropriate  Outcome: Progressing  Goal: Maintains/Returns to pre admission functional level  Description: INTERVENTIONS:  - Perform BMAT or MOVE assessment daily.   - Set and communicate daily mobility goal to care team and patient/family/caregiver. - Collaborate with rehabilitation services on mobility goals if consulted  - Perform Range of Motion 3 times a day. - Reposition patient every 3 hours.   - Dangle patient 3 times a day  - Stand patient 3 times a day  - Ambulate patient 3 times a day  - Out of bed to chair 3 times a day   - Out of bed for meals 3 times a day  - Out of bed for toileting  - Record patient progress and toleration of activity level   Outcome: Progressing     Problem: DISCHARGE PLANNING  Goal: Discharge to home or other facility with appropriate resources  Description: INTERVENTIONS:  - Identify barriers to discharge w/patient and caregiver  - Arrange for needed discharge resources and transportation as appropriate  - Identify discharge learning needs (meds, wound care, etc.)  - Arrange for interpretive services to assist at discharge as needed  - Refer to Case Management Department for coordinating discharge planning if the patient needs post-hospital services based on physician/advanced practitioner order or complex needs related to functional status, cognitive ability, or social support system  Outcome: Progressing     Problem: Prexisting or High Potential for Compromised Skin Integrity  Goal: Skin integrity is maintained or improved  Description: INTERVENTIONS:  - Identify patients at risk for skin breakdown  - Assess and monitor skin integrity  - Assess and monitor nutrition and hydration status  - Monitor labs   - Assess for incontinence   - Turn and reposition patient  - Assist with mobility/ambulation  - Relieve pressure over bony prominences  - Avoid friction and shearing  - Provide appropriate hygiene as needed including keeping skin clean and dry  - Evaluate need for skin moisturizer/barrier cream  - Collaborate with interdisciplinary team   - Patient/family teaching  - Consider wound care consult   Outcome: Progressing     Problem: Nutrition/Hydration-ADULT  Goal: Nutrient/Hydration intake appropriate for improving, restoring or maintaining nutritional needs  Description: Monitor and assess patient's nutrition/hydration status for malnutrition. Collaborate with interdisciplinary team and initiate plan and interventions as ordered. Monitor patient's weight and dietary intake as ordered or per policy. Utilize nutrition screening tool and intervene as necessary. Determine patient's food preferences and provide high-protein, high-caloric foods as appropriate.      INTERVENTIONS:  - Monitor oral intake, urinary output, labs, and treatment plans  - Assess nutrition and hydration status and recommend course of action  - Evaluate amount of meals eaten  - Assist patient with eating if necessary   - Allow adequate time for meals  - Recommend/ encourage appropriate diets, oral nutritional supplements, and vitamin/mineral supplements  - Order, calculate, and assess calorie counts as needed  - Recommend, monitor, and adjust tube feedings and TPN/PPN based on assessed needs  - Assess need for intravenous fluids  - Provide specific nutrition/hydration education as appropriate  - Include patient/family/caregiver in decisions related to nutrition  Outcome: Progressing     Problem: MOBILITY - ADULT  Goal: Maintain or return to baseline ADL function  Description: INTERVENTIONS:  -  Assess patient's ability to carry out ADLs; assess patient's baseline for ADL function and identify physical deficits which impact ability to perform ADLs (bathing, care of mouth/teeth, toileting, grooming, dressing, etc.)  - Assess/evaluate cause of self-care deficits   - Assess range of motion  - Assess patient's mobility; develop plan if impaired  - Assess patient's need for assistive devices and provide as appropriate  - Encourage maximum independence but intervene and supervise when necessary  - Involve family in performance of ADLs  - Assess for home care needs following discharge   - Consider OT consult to assist with ADL evaluation and planning for discharge  - Provide patient education as appropriate  Outcome: Progressing  Goal: Maintains/Returns to pre admission functional level  Description: INTERVENTIONS:  - Perform BMAT or MOVE assessment daily.   - Set and communicate daily mobility goal to care team and patient/family/caregiver. - Collaborate with rehabilitation services on mobility goals if consulted  - Perform Range of Motion 3 times a day. - Reposition patient every 3 hours.   - Dangle patient 3 times a day  - Stand patient 3 times a day  - Ambulate patient 3 times a day  - Out of bed to chair 3 times a day   - Out of bed for meals 3 times a day  - Out of bed for toileting  - Record patient progress and toleration of activity level   Outcome: Progressing     Problem: Nutrition/Hydration-ADULT  Goal: Nutrient/Hydration intake appropriate for improving, restoring or maintaining nutritional needs  Description: Monitor and assess patient's nutrition/hydration status for malnutrition. Collaborate with interdisciplinary team and initiate plan and interventions as ordered. Monitor patient's weight and dietary intake as ordered or per policy. Utilize nutrition screening tool and intervene as necessary. Determine patient's food preferences and provide high-protein, high-caloric foods as appropriate.      INTERVENTIONS:  - Monitor oral intake, urinary output, labs, and treatment plans  - Assess nutrition and hydration status and recommend course of action  - Evaluate amount of meals eaten  - Assist patient with eating if necessary   - Allow adequate time for meals  - Recommend/ encourage appropriate diets, oral nutritional supplements, and vitamin/mineral supplements  - Order, calculate, and assess calorie counts as needed  - Recommend, monitor, and adjust tube feedings and TPN/PPN based on assessed needs  - Assess need for intravenous fluids  - Provide specific nutrition/hydration education as appropriate  - Include patient/family/caregiver in decisions related to nutrition  Outcome: Progressing

## 2023-07-20 NOTE — NURSING NOTE
Patient found on floor by PCA. Patient examined by provider and transferred safely back to bed. Patient denies head strike. Patient sent for follow-up CT and X-Ray.

## 2023-07-20 NOTE — PROGRESS NOTES
07/20/23 0850   Pain Assessment   Pain Assessment Tool 0-10   Pain Score No Pain   Restrictions/Precautions   Precautions Aspiration;Bed/chair alarms;Cognitive; Fall Risk;Supervision on toilet/commode  (virtual `1:1)   Eating   Type of Assistance Needed Set-up / clean-up;Supervision;Verbal cues   Physical Assistance Level No physical assistance   Eating CARE Score 4   Swallow Assessment   Swallow Treatment Assessment   Daily Dysphagia Tx Note     Patient Name: Niesha Dunn    [id number] Date: 7/20/2023      Current Risks for Dysphagia & Aspiration: Weak voicing; weak cough; dysarthria; general debilitation; new neuro event; brain injury; cognitive deficit; hx neurologic dx; positioning issues     Current Symptoms/Concerns: difficulty chewing; decreased oral intake; hx of dysphagia/aspiration; throat clearing; cough with intake     Current diet:puree/level 1 diet and nectar thick liquids      Premorbid diet::puree/level 1 diet and nectar thick liquids      Positioning: upright in wheelchair    Items administered:Consistencies Administered: nectar thick, puree and mechanical soft solids  Materials administered included: oatmeal, pureed American toast, pureed eggs, applesauce, NT by cup    Total amount of meal consumed:   50% of oatmeal  75% of pureed  [place] toast  100% pureed eggs  100% applesauce  120cc of NTL by cup      Oral stage:mild-moderate  Lip closure: overall functional but slightly decreased w/ puree   Anterior spillage: x2 w/ dry puree  Mastication/manipulation: decreased across both moist and dry textures as well as increased time to manipulate noted  Bolus formation: decreased today w/ dry puree  Bolus control: decreased   Transfer: delayed  Oral residue: minimal L residual which pt cleared given incresed time  Pocketing: none         Pharyngeal stage:moderate  Swallow promptness: delayed across all consistencies puree>NT  Hyolaryngeal elevation: still decreased, eliciting more effort as meal progress likely due to fatigue   Wet voice: none   Throat clear: x2 w/ NT  Cough: delayed x1 after puree  Secondary swallows: frequently elicited throughout meal  Audible swallows: noted more as meal progressed and pt fatigues       Esophageal stage:mild-moderate  Pt noted to have increased effort when eliciting swallows near end of meal. Also visually appeared to be in pain, when swallowing. SLP asking pt about pain but pt reporting more food sticking. Pt also w/ belching post consumption of meal and then did not want any other intake. Pt also noted to demonstrate 'hawking' given oatmeal.      Summary:     Pt presenting with mild-moderate oral and moderate pharyngeal dysphagia today. Symptoms or concerns included decreased bolus propulsion, decreased mastication, decreased bolus formation, delayed oral intiation, suspected decreased control of NTL, oatmeal , oral residue with dry purees , residue effectively cleared with increased time or liquid wash, piecemeal deglutition and anterior loss/spillage of puree x2 suspected pharyngeal swallow delay, suspected decreased hyolaryngeal elevation upon palpation, suspected pharyngeal residue, multiple swallows, effortful swallow, audible swallows and pt exhibiting globus feeling near end of meal, eliciting belching . Initially at the beginning of meal, pt was noted to have decreased attention and recall of swallow strategies, to where SLP did have to provide verbal cues to not talk during meal, smaller bites as well as need for liquid wash initially as well, but as SLP provided that initial verbal cues, pt was able to carryover strategies as meal progressed. For swallow function, pt was noted to demonstrate increased mastication/manipulation given consistencies observed which did lead to decreased formulation and mildly incoordinated transfers which lead to piecemeal transfers/swallows.  Early in meal, pt did have "hawking" x2 after oatmeal, but no further esophageal events until later in meal. Bolus control/transfer of NTL by cup did fluctuate pending bolus size. Swallow continues to be mild-moderately delayed given consistencies observed. Hyolaryngeal excursion remains decreased overall and today, pt was noted to fatigue as meal progressed leading to more consistent double swallows. Audible swallows also elicited near of meal. Throat clearing elicited x2 after NTL by cup and delayed cough x1 after puree. As per above in comments under esophageal stage observations, pt did exhibit more difficulty given tolerance of puree (applesauce) due to reported globus sensation and increased belching was noted. Recommendations:  Diet: puree/level 1 diet and nectar thick liquids  Meds: whole or cut with puree  Strategies: upright posture, only feed when fully alert, slow rate of feeding, small bites/sips, effortful swallow, quiet environment (tv off, limit talking, door closed, etc.) and alternating bites and sips     FULL supervision w/ meals. Results reviewed with: patient  Aspiration precautions posted. F/u ST tx: Pt is currently recommended for further skilled SLP services targeting dysphagia therapy in order to maximize oral and pharyngeal swallow skills, while safely supporting PO intake, as well as to improve independent carryover of safe swallow strategies. Plan: Cont to monitor pt on current diet              Trial upgraded textures of dysphagia level 2 and thin liquids as appropriate              Will benefit from f/u with GI team which pt follows with [facility] given esophageal concerns. Cont education on puree diet and NTL with strategies for at home (education folder started in room with handouts)   Swallow Assessment Prognosis   Prognosis Fair   Prognosis Considerations Age; Co-morbidities; Family/community support;Medical diagnosis; Medical prognosis;Previous level of function;Severity of impairments;Ability to carry over   SLP Therapy Minutes   SLP Time In 0119   SLP Time Out 0930   SLP Total Time (minutes) 40   SLP Mode of treatment - Individual (minutes) 40   SLP Mode of treatment - Concurrent (minutes) 0   SLP Mode of treatment - Group (minutes) 0   SLP Mode of treatment - Co-treat (minutes) 0   SLP Mode of Treatment - Total time(minutes) 40 minutes   SLP Cumulative Minutes 475   Therapy Time missed   Time missed?  No

## 2023-07-20 NOTE — PROGRESS NOTES
Internal Medicine Progress Note  Patient: Candy Hernandez  Age/sex: 70 y.o. female  Medical Record #: [id number]      ASSESSMENT/PLAN: (Interval History)  Candy Hernandez is seen and examined and management for following issues:    Left lateral thigh hematoma   • Stable     ABLA  • S/p 1 unit PRBCs 7/2/23  • CBC 7/18/23 stable     Fall  · While getting OOB 7/19  · Did not hit head but unwitnessed  · CTH/CT cerv spine with no acute changes    Thrush  • S/p Nystatin     Chronic combined systolic/diastolic heart failure   • Originally diagnosed 2004 when LVEF then was 10-15% and felt to be NICM  • Her LVEF did recover into 50's then fell when she had her CVA in 2/2023 to 30%  • Also has moderate AI/gr 2 DD  • Sees Dr Reyna France a [facility] in 2/2023 when LVEF was found to be 30% which is most current  • Had been on Coreg in past but HF team switched her to Toprol when she had her stroke 2/2023  • Her soft BPs have limited starting her on ACEI/ARB as OP  • Home:  Toprol XL 25mg BID/Aldactone 25mg qd  • Here:  Toprol XL 25 mg QD = able to get last 4 days but previously missed doses 2/2 low BPs  • D/C'd Aldactone 7/16  • stable     Hypercalcemia  • Continue Cinacalcet here as was taking at home  • Calcium 8.9 on 7/18/23     HS hypoxia   • using 4L NC HS at home  • Not using O2 at night here     Nightmares  • placed on Minipress when here with us after her CVA  • D/w Dr Zara Bee, d/c'd since had been too low to get  • Minipress dc'd 2/2 hypotension     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  • Has residual left sided weakness  • Had recrudescence of CVA 4/2023 with worsening left facial droop  • Continue Eliquis     Dysphagia  • Had admission for aspiration PNA at [facility] 5/2023 tx'd with Levaquin and Flagyl  • Continue pureed diet with NTL as at home  • ST following     Weight loss  • Continue Ensure HP pudding TID and Magic cups TID  • She was on no supplements at home so likely will gain some wt that she is eating/drinking supps here  • adamantly refuses PEG tube  • Nutrition following     Bipolar depression  • Home:  Wellbutrin 100mg BID/Cymbalta 60mg BID/Lamictal 150mg qhs  • Here:  Wellbutrin 100mg BID/Cymbalta 60mg BID/Lamictal 150mg qhs     Rheumatoid arthritis  • Continue Plaquenil 200mg BID as at home     Chronic pain/chronic opioid use  • 2/2 RA and lumbar spondylosis  • Pain management per PMR     Hx incidental right superior hypophyseal artery aneurysm  • Follows with NS Dr Ruslan Devine     [facility] urine 3/2023  • Got Cipro then to [facility] for tx at the time  • Maintain contact isolation     Dye allergy   • Was dye prepped on 7/4/23 with Benadryl 50mg IV x 1 w/o issues     Incidental findings  • Pancolitis with increasing inflammatory free fluid in pelvis  • Calcified granuloma RLL     Hypokalemia  • Got Kdur 40 meq x 2 on 7/8/23  • Stable at 4.1     Enterococcus UTI  • S/p Altamease Pih Dr Depadua = sensitive  • Now getting UA for c/o dysuria again that started yesterday  • Thi Lehman getting Zenovia Meeter last evening going to the bathroom         Discharge date:  7/21/23       The above assessment and plan was reviewed and updated as determined by my evaluation of the patient on 7/20/2023.     Labs:   Results from last 7 days   Lab Units 07/20/23  0643 07/18/23  0516   WBC Thousand/uL 4.78 5.07   HEMOGLOBIN g/dL 10.9* 9.9*   HEMATOCRIT % 36.2 30.9*   PLATELETS Thousands/uL 197 220     Results from last 7 days   Lab Units 07/20/23  0643 07/18/23  0516   SODIUM mmol/L 140 143   POTASSIUM mmol/L 4.1 4.3   CHLORIDE mmol/L 111* 109*   CO2 mmol/L 28 30   BUN mg/dL 17 18   CREATININE mg/dL 0.89 0.84   CALCIUM mg/dL 9.0 8.9                   Review of Scheduled Meds:  Current Facility-Administered Medications   Medication Dose Route Frequency Provider Last Rate   • acetaminophen  650 mg Oral Q6H [facility] Sampson Gaffney MD     • albuterol  2 puff Inhalation Q6H PRN Sampson Gaffney MD     • apixaban  5 mg Oral BID Sampson Gaffney MD     • buPROPion  100 mg Oral BID Durga Valladares MD     • cinacalcet  30 mg Oral Daily With Breakfast Durga Valladares MD     • Diclofenac Sodium  2 g Topical 4x Daily PRN Durga Valladares MD     • DULoxetine  60 mg Oral BID Durga Valladares MD     • gabapentin  400 mg Oral BID Durga Valladares MD     • hydroxychloroquine  200 mg Oral BID With Meals Durga Valladares MD     • lamoTRIgine  150 mg Oral HS Durga Valladares MD     • lidocaine  1 patch Topical Daily LAURA Rutledge     • magnesium Oxide  400 mg Oral BID Durga Valladares MD     • melatonin  3 mg Oral HS Durga Valladares MD     • metoprolol succinate  25 mg Oral Daily LAURA Zavala     • morphine  7.5 mg Oral TID PRN Shorty Dobbins MD     • ondansetron  4 mg Oral Q6H PRN Durga Valladares MD     • pantoprazole  40 mg Oral Early Morning Durga Valladares MD     • polyethylene glycol  17 g Oral Daily PRN LAURA Rutledge     • simethicone  80 mg Oral 4x Daily (with meals and at bedtime) LAURA Davis         Subjective/ HPI: Patient seen and examined. Patients overnight issues or events were reviewed with nursing or staff during rounds or morning huddle session. New or overnight issues include the following:     No new or overnight issues. She fell yesterday on 3-11 shift when getting OOB w/o calling for help. Offers no complaints    ROS:   A 10 point ROS was performed; negative except as noted above. Imaging:     CT head wo contrast   Final Result by Cherelle Wiggins MD (07/19 2025)      No interval change. No acute intracranial hemorrhage or depressed calvarial fracture identified. Chronic right MCA distribution infarct. Workstation performed: [id number]         CT spine cervical wo contrast   Final Result by Cherelle Wiggins MD (07/19 2029)      No acute fracture or evidence for traumatic malalignment.  No interval change since prior exam.                  Workstation performed: [id number]         XR hip/pelv 2-3 vws left if performed   Final Result by Vidya Lazar MD (07/19 1856)      No acute osseous abnormality. Workstation performed: [id number]             *Labs /Radiology studies reviewed  *Medications reviewed and reconciled as needed  *Please refer to order section for additional ordered labs studies  *Case discussed with primary attending during morning huddle case rounds    Physical Examination:  Vitals:   Vitals:    07/19/23 2015 07/20/23 0450 07/20/23 0600 07/20/23 0819   BP: 91/52 122/62  92/56   BP Location: Left arm Left arm     Pulse: 86 87  81   Resp: 16 19     Temp: (!) 96.8 °F (36 °C) 97.5 °F (36.4 °C)     TempSrc: Axillary Axillary     SpO2: 96% 97%     Weight:   51.6 kg (113 lb 12.1 oz)    Height:           General Appearance: no distress, conversive  HEENT:  External ear normal.  Nose normal w/o drainage. Mucous membranes are moist. Oropharynx is clear. Conjunctiva clear w/o icterus or redness. Neck:  Supple, normal ROM  Lungs: BBS without crackles/wheeze/rhonchi; respirations unlabored with normal inspiratory/expiratory effort. No retractions noted. On RA  CV: regular rate and rhythm; no rubs/murmurs/gallops, PMI normal   ABD: Abdomen is soft. Bowel sounds all quadrants. Nontender with no distention. EXT: no edema  Skin: normal turgor, normal texture, no rashes  Psych: affect normal, mood normal  Neuro: AAO     The above physical exam was reviewed and updated as determined by my evaluation of the patient on 7/20/2023. Invasive Devices     None                    VTE Pharmacologic Prophylaxis: Eliquis  Code Status: Level 1 - Full Code  Current Length of Stay: 13 day(s)      Total time spent:  30 minutes with more than 50% spent counseling/coordinating care. Counseling includes discussion with patient re: progress  and discussion with patient of his/her current medical state/information. Coordination of patient's care was performed in conjunction with primary service.  Time invested included review of patient's labs, vitals, and management of their comorbidities with continued monitoring. In addition, this patient was discussed with medical team including physician and advanced extenders. The care of the patient was extensively discussed and appropriate treatment plan was formulated unique for this patient. Medical decision making for the day was made by supervising physician unless otherwise noted in their attestation statement. ** Please Note:  voice to text software may have been used in the creation of this document.  Although proof errors in transcription or interpretation are a potential of such software**

## 2023-07-20 NOTE — PHYSICAL THERAPY NOTE
Call to Larisa’s daughter, Lois Ramirez to update her on patient’s functional status following fall yesterday. Informed Lois Ramirez, that her mother is requiring increased assistance with transfers (Fei vs Supervision) and ambulation (CGA vs Supervision). Initially plan for DC was tomorrow, but given change in functional status therapy recommending moving DC date. Clarence agreeable to moving discharge date to early next week. Lois Ramirez is going to come in for family training on Monday, 7/24 with OT, PT, and ST. FT time placed on therapy schedule. Lois Ramirez has purchased bed/chair alarm for home as well as Boost Cups and gel for thickening. Discussed purchasing fall mat for next to the bed at home. Daughter does not think there is room for a mat next to the bed. Family still wishes to take patient home unless she does not make progress. Updated care team on discussion with Lois Ramirez.

## 2023-07-20 NOTE — PROGRESS NOTES
07/20/23 1030   Pain Assessment   Pain Assessment Tool 0-10   Pain Score 6   Pain Location/Orientation Location: Back   Restrictions/Precautions   Precautions Aspiration;Bed/chair alarms; Fall Risk;Cognitive;Pain;Supervision on toilet/commode;Visual deficit  (Virtual 1:1)   Weight Bearing Restrictions No   Cognition   Overall Cognitive Status Impaired   Arousal/Participation Alert; Cooperative   Attention Attends with cues to redirect   Orientation Level Oriented X4   Memory Decreased short term memory;Decreased recall of recent events;Decreased recall of precautions   Following Commands Follows one step commands with increased time or repetition   Comments Virtual 1:1 following fall yesterday afternoon   Subjective   Subjective Patient agreeable to PT session. Reports increased pain in low back following fall yesterday. Pain is different than the chronic pain she experiences   Lying to Sitting on Side of Bed   Type of Assistance Needed Supervision   Comment increased time required   Lying to Sitting on Side of Bed CARE Score 4   Sit to Stand   Type of Assistance Needed Physical assistance   Physical Assistance Level 25% or less   Comment Fei/CGA for steadying/balance. Pt reports increased pain with transitional movement. VCs for hand placement   Sit to Stand CARE Score 3   Bed-Chair Transfer   Type of Assistance Needed Physical assistance   Physical Assistance Level 25% or less   Comment Fei/CGA for steadying/balance. Pt reports increased pain with transitional movement. Chair/Bed-to-Chair Transfer CARE Score 3   Walk 10 Feet   Type of Assistance Needed Incidental touching   Physical Assistance Level No physical assistance   Comment CGA with RW   Walk 10 Feet CARE Score 4   Walk 50 Feet with Two Turns   Type of Assistance Needed Physical assistance   Physical Assistance Level Total assistance   Comment CGA with RW + CF. Initially patient unable to ambulate greater than 30 ft 2/2 to low back pain.  Due to pain level CF for safety   Walk 50 Feet with Two Turns CARE Score 1   Ambulation   Primary Mode of Locomotion Prior to Admission Walk   Distance Walked (feet) 30 ft  (120 ft)   Assist Device Roller Walker   Gait Pattern Slow Amy; Inconsistant Amy;Narrow AYLA;Step to; Step through; Improper weight shift; Forward Flexion   Limitations Noted In Balance; Endurance; Safety;Speed;Strength;Swing;Posture   Does the patient walk? 2. Yes   Wheel 50 Feet with Two Turns   Comment discharged WC goals. no longer have to care score   Reason if not Attempted Activity not applicable   Wheel 50 Feet with Two Turns CARE Score 9   Wheel 150 Feet   Comment discharged WC goals. no longer have to care scoredischarged WC goals. no longer have to care score   Reason if not Attempted Activity not applicable   Wheel 532 Feet CARE Score 9   Curb or Single Stair   Style negotiated Curb   Type of Assistance Needed Physical assistance   Physical Assistance Level 25% or less   Comment 6 inch step with RW, Fei for management of RW and balance 2/2 to increase pain and unsteadiness. 1 Step (Curb) CARE Score 3   Assessment   Treatment Assessment Patient participated in 60 minute skilled physical therapy session. PT session focused on reassessing functional mobility following unwitnessed fall in room yesterday. Patient reporting increased pain in low back area. Per chart review, Xrays negative. Pt now requiring Fei for transfers, CGA for ambulation, and Fei for curb management. Prior to fall, required Supervision. Initial plan was to discharge patient home tomorrow but due to increase level of assistance required she will discharge at later date, likely early next week. Call to daughter, Pura Mason, at end of session with patient present, to provide update on patient's functional status and moving discharge date. Clarence to call PT back around noon.  Patient would continue to benefit from skilled physical therapy servies to work on pain management, gait training, transfer training, family training, LE strengthening, and balance training. Problem List Decreased strength;Decreased range of motion;Decreased endurance; Impaired balance;Decreased mobility; Decreased coordination;Decreased safety awareness; Impaired vision   Barriers to Discharge Inaccessible home environment;Decreased caregiver support   PT Barriers   Functional Limitation Car transfers;Stair negotiation;Standing;Transfers; Walking   Plan   Treatment/Interventions Functional transfer training;LE strengthening/ROM; Elevations; Therapeutic exercise; Endurance training;Equipment eval/education;Patient/family training;Bed mobility;Gait training   Progress   (set back 2/2 to recent fall. improving slowly)   Recommendation   PT Discharge Recommendation Home with home health rehabilitation   Equipment Recommended Walker   PT Therapy Minutes   PT Time In 1030   PT Time Out 1130   PT Total Time (minutes) 60   PT Mode of treatment - Individual (minutes) 60   PT Mode of treatment - Concurrent (minutes) 0   PT Mode of treatment - Group (minutes) 0   PT Mode of treatment - Co-treat (minutes) 0   PT Mode of Treatment - Total time(minutes) 60 minutes   PT Cumulative Minutes 753   Therapy Time missed   Time missed?  No     Yossi Crow, PT, DPT

## 2023-07-20 NOTE — PLAN OF CARE
Problem: PAIN - ADULT  Goal: Verbalizes/displays adequate comfort level or baseline comfort level  Description: Interventions:  - Encourage patient to monitor pain and request assistance  - Assess pain using appropriate pain scale  - Administer analgesics based on type and severity of pain and evaluate response  - Implement non-pharmacological measures as appropriate and evaluate response  - Consider cultural and social influences on pain and pain management  - Notify physician/advanced practitioner if interventions unsuccessful or patient reports new pain  Outcome: Progressing     Problem: INFECTION - ADULT  Goal: Absence or prevention of progression during hospitalization  Description: INTERVENTIONS:  - Assess and monitor for signs and symptoms of infection  - Monitor lab/diagnostic results  - Monitor all insertion sites, i.e. indwelling lines, tubes, and drains  - Monitor endotracheal if appropriate and nasal secretions for changes in amount and color  - [place] appropriate cooling/warming therapies per order  - Administer medications as ordered  - Instruct and encourage patient and family to use good hand hygiene technique  - Identify and instruct in appropriate isolation precautions for identified infection/condition  Outcome: Progressing  Goal: Absence of fever/infection during neutropenic period  Description: INTERVENTIONS:  - Monitor WBC    Outcome: Progressing     Problem: SAFETY ADULT  Goal: Patient will remain free of falls  Description: INTERVENTIONS:  - Educate patient/family on patient safety including physical limitations  - Instruct patient to call for assistance with activity   - Consult OT/PT to assist with strengthening/mobility   - Keep Call bell within reach  - Keep bed low and locked with side rails adjusted as appropriate  - Keep care items and personal belongings within reach  - Initiate and maintain comfort rounds  - Make Fall Risk Sign visible to staff  - Offer Toileting every 2 Hours, in advance of need  - Initiate/Maintain bed. chair alarm  - Obtain necessary fall risk management equipment: nonskid socks  - Apply yellow socks and bracelet for high fall risk patients  - Consider moving patient to room near nurses station  Outcome: Progressing  Goal: Maintain or return to baseline ADL function  Description: INTERVENTIONS:  -  Assess patient's ability to carry out ADLs; assess patient's baseline for ADL function and identify physical deficits which impact ability to perform ADLs (bathing, care of mouth/teeth, toileting, grooming, dressing, etc.)  - Assess/evaluate cause of self-care deficits   - Assess range of motion  - Assess patient's mobility; develop plan if impaired  - Assess patient's need for assistive devices and provide as appropriate  - Encourage maximum independence but intervene and supervise when necessary  - Involve family in performance of ADLs  - Assess for home care needs following discharge   - Consider OT consult to assist with ADL evaluation and planning for discharge  - Provide patient education as appropriate  Outcome: Progressing  Goal: Maintains/Returns to pre admission functional level  Description: INTERVENTIONS:  - Perform BMAT or MOVE assessment daily.   - Set and communicate daily mobility goal to care team and patient/family/caregiver. - Collaborate with rehabilitation services on mobility goals if consulted  - Perform Range of Motion 3 times a day. - Reposition patient every 2 hours.   - Dangle patient 3 times a day  - Stand patient 3 times a day  - Ambulate patient 3 times a day  - Out of bed to chair 3 times a day   - Out of bed for meals 3 times a day  - Out of bed for toileting  - Record patient progress and toleration of activity level   Outcome: Progressing     Problem: DISCHARGE PLANNING  Goal: Discharge to home or other facility with appropriate resources  Description: INTERVENTIONS:  - Identify barriers to discharge w/patient and caregiver  - Arrange for needed discharge resources and transportation as appropriate  - Identify discharge learning needs (meds, wound care, etc.)  - Arrange for interpretive services to assist at discharge as needed  - Refer to Case Management Department for coordinating discharge planning if the patient needs post-hospital services based on physician/advanced practitioner order or complex needs related to functional status, cognitive ability, or social support system  Outcome: Progressing     Problem: Prexisting or High Potential for Compromised Skin Integrity  Goal: Skin integrity is maintained or improved  Description: INTERVENTIONS:  - Identify patients at risk for skin breakdown  - Assess and monitor skin integrity  - Assess and monitor nutrition and hydration status  - Monitor labs   - Assess for incontinence   - Turn and reposition patient  - Assist with mobility/ambulation  - Relieve pressure over bony prominences  - Avoid friction and shearing  - Provide appropriate hygiene as needed including keeping skin clean and dry  - Evaluate need for skin moisturizer/barrier cream  - Collaborate with interdisciplinary team   - Patient/family teaching  - Consider wound care consult   Outcome: Progressing     Problem: Nutrition/Hydration-ADULT  Goal: Nutrient/Hydration intake appropriate for improving, restoring or maintaining nutritional needs  Description: Monitor and assess patient's nutrition/hydration status for malnutrition. Collaborate with interdisciplinary team and initiate plan and interventions as ordered. Monitor patient's weight and dietary intake as ordered or per policy. Utilize nutrition screening tool and intervene as necessary. Determine patient's food preferences and provide high-protein, high-caloric foods as appropriate.      INTERVENTIONS:  - Monitor oral intake, urinary output, labs, and treatment plans  - Assess nutrition and hydration status and recommend course of action  - Evaluate amount of meals eaten  - Assist patient with eating if necessary   - Allow adequate time for meals  - Recommend/ encourage appropriate diets, oral nutritional supplements, and vitamin/mineral supplements  - Order, calculate, and assess calorie counts as needed  - Recommend, monitor, and adjust tube feedings and TPN/PPN based on assessed needs  - Assess need for intravenous fluids  - Provide specific nutrition/hydration education as appropriate  - Include patient/family/caregiver in decisions related to nutrition  Outcome: Progressing     Problem: Nutrition/Hydration-ADULT  Goal: Nutrient/Hydration intake appropriate for improving, restoring or maintaining nutritional needs  Description: Monitor and assess patient's nutrition/hydration status for malnutrition. Collaborate with interdisciplinary team and initiate plan and interventions as ordered. Monitor patient's weight and dietary intake as ordered or per policy. Utilize nutrition screening tool and intervene as necessary. Determine patient's food preferences and provide high-protein, high-caloric foods as appropriate.      INTERVENTIONS:  - Monitor oral intake, urinary output, labs, and treatment plans  - Assess nutrition and hydration status and recommend course of action  - Evaluate amount of meals eaten  - Assist patient with eating if necessary   - Allow adequate time for meals  - Recommend/ encourage appropriate diets, oral nutritional supplements, and vitamin/mineral supplements  - Order, calculate, and assess calorie counts as needed  - Recommend, monitor, and adjust tube feedings and TPN/PPN based on assessed needs  - Assess need for intravenous fluids  - Provide specific nutrition/hydration education as appropriate  - Include patient/family/caregiver in decisions related to nutrition  Outcome: Progressing     Problem: MOBILITY - ADULT  Goal: Maintain or return to baseline ADL function  Description: INTERVENTIONS:  -  Assess patient's ability to carry out ADLs; assess patient's baseline for ADL function and identify physical deficits which impact ability to perform ADLs (bathing, care of mouth/teeth, toileting, grooming, dressing, etc.)  - Assess/evaluate cause of self-care deficits   - Assess range of motion  - Assess patient's mobility; develop plan if impaired  - Assess patient's need for assistive devices and provide as appropriate  - Encourage maximum independence but intervene and supervise when necessary  - Involve family in performance of ADLs  - Assess for home care needs following discharge   - Consider OT consult to assist with ADL evaluation and planning for discharge  - Provide patient education as appropriate  Outcome: Progressing  Goal: Maintains/Returns to pre admission functional level  Description: INTERVENTIONS:  - Perform BMAT or MOVE assessment daily.   - Set and communicate daily mobility goal to care team and patient/family/caregiver. - Collaborate with rehabilitation services on mobility goals if consulted  - Perform Range of Motion 3 times a day. - Reposition patient every 2 hours.   - Dangle patient 3 times a day  - Stand patient 3 times a day  - Ambulate patient 3 times a day  - Out of bed to chair 3 times a day   - Out of bed for meals 3 times a day  - Out of bed for toileting  - Record patient progress and toleration of activity level   Outcome: Progressing

## 2023-07-21 ENCOUNTER — APPOINTMENT (OUTPATIENT)
Dept: RADIOLOGY | Facility: HOSPITAL | Age: 68
DRG: 949 | End: 2023-07-21
Payer: MEDICARE

## 2023-07-21 PROCEDURE — 99418 PROLNG IP/OBS E/M EA 15 MIN: CPT

## 2023-07-21 PROCEDURE — G0316 PR PROLONG INPT EVAL ADD15 M: HCPCS

## 2023-07-21 PROCEDURE — 99232 SBSQ HOSP IP/OBS MODERATE 35: CPT | Performed by: NURSE PRACTITIONER

## 2023-07-21 PROCEDURE — 72072 X-RAY EXAM THORAC SPINE 3VWS: CPT

## 2023-07-21 PROCEDURE — 99233 SBSQ HOSP IP/OBS HIGH 50: CPT

## 2023-07-21 PROCEDURE — 97535 SELF CARE MNGMENT TRAINING: CPT

## 2023-07-21 PROCEDURE — 72100 X-RAY EXAM L-S SPINE 2/3 VWS: CPT

## 2023-07-21 PROCEDURE — 97530 THERAPEUTIC ACTIVITIES: CPT

## 2023-07-21 PROCEDURE — 97110 THERAPEUTIC EXERCISES: CPT

## 2023-07-21 PROCEDURE — 92526 ORAL FUNCTION THERAPY: CPT

## 2023-07-21 RX ORDER — GABAPENTIN 300 MG/1
300 CAPSULE ORAL 2 TIMES DAILY
Status: DISCONTINUED | OUTPATIENT
Start: 2023-07-22 | End: 2023-07-29 | Stop reason: HOSPADM

## 2023-07-21 RX ORDER — LIDOCAINE 50 MG/G
1 PATCH TOPICAL
Status: DISCONTINUED | OUTPATIENT
Start: 2023-07-21 | End: 2023-07-24

## 2023-07-21 RX ORDER — XYLITOL/YERBA SANTA
5 AEROSOL, SPRAY WITH PUMP (ML) MUCOUS MEMBRANE 4 TIMES DAILY PRN
Status: DISCONTINUED | OUTPATIENT
Start: 2023-07-21 | End: 2023-07-24

## 2023-07-21 RX ORDER — MORPHINE SULFATE 15 MG/1
7.5 TABLET ORAL ONCE
Status: COMPLETED | OUTPATIENT
Start: 2023-07-21 | End: 2023-07-21

## 2023-07-21 RX ORDER — POLYETHYLENE GLYCOL 3350 17 G/17G
17 POWDER, FOR SOLUTION ORAL ONCE
Status: DISCONTINUED | OUTPATIENT
Start: 2023-07-21 | End: 2023-07-29 | Stop reason: HOSPADM

## 2023-07-21 RX ORDER — MORPHINE SULFATE 15 MG/1
7.5 TABLET ORAL 2 TIMES DAILY PRN
Status: DISCONTINUED | OUTPATIENT
Start: 2023-07-21 | End: 2023-07-21

## 2023-07-21 RX ORDER — MORPHINE SULFATE 15 MG/1
7.5 TABLET ORAL 3 TIMES DAILY PRN
Status: DISCONTINUED | OUTPATIENT
Start: 2023-07-21 | End: 2023-07-29 | Stop reason: HOSPADM

## 2023-07-21 RX ADMIN — ACETAMINOPHEN 650 MG: 325 TABLET, FILM COATED ORAL at 00:04

## 2023-07-21 RX ADMIN — DICLOFENAC SODIUM 2 G: 10 GEL TOPICAL at 21:10

## 2023-07-21 RX ADMIN — ACETAMINOPHEN 650 MG: 325 TABLET, FILM COATED ORAL at 12:51

## 2023-07-21 RX ADMIN — ALBUTEROL SULFATE 2 PUFF: 90 AEROSOL, METERED RESPIRATORY (INHALATION) at 22:08

## 2023-07-21 RX ADMIN — LAMOTRIGINE 150 MG: 100 TABLET ORAL at 21:07

## 2023-07-21 RX ADMIN — DICLOFENAC SODIUM 2 G: 10 GEL TOPICAL at 18:02

## 2023-07-21 RX ADMIN — ACETAMINOPHEN 650 MG: 325 TABLET, FILM COATED ORAL at 18:00

## 2023-07-21 RX ADMIN — HYDROXYCHLOROQUINE SULFATE 200 MG: 200 TABLET ORAL at 09:00

## 2023-07-21 RX ADMIN — SIMETHICONE 80 MG: 80 TABLET, CHEWABLE ORAL at 09:11

## 2023-07-21 RX ADMIN — CINACALCET 30 MG: 30 TABLET, FILM COATED ORAL at 09:00

## 2023-07-21 RX ADMIN — APIXABAN 5 MG: 5 TABLET, FILM COATED ORAL at 08:59

## 2023-07-21 RX ADMIN — ONDANSETRON 4 MG: 4 TABLET, ORALLY DISINTEGRATING ORAL at 15:39

## 2023-07-21 RX ADMIN — MELATONIN TAB 3 MG 3 MG: 3 TAB at 21:06

## 2023-07-21 RX ADMIN — MORPHINE SULFATE 7.5 MG: 15 TABLET ORAL at 19:14

## 2023-07-21 RX ADMIN — DICLOFENAC SODIUM 2 G: 10 GEL TOPICAL at 12:52

## 2023-07-21 RX ADMIN — GABAPENTIN 400 MG: 400 CAPSULE ORAL at 18:00

## 2023-07-21 RX ADMIN — ACETAMINOPHEN 650 MG: 325 TABLET, FILM COATED ORAL at 05:19

## 2023-07-21 RX ADMIN — SIMETHICONE 80 MG: 80 TABLET, CHEWABLE ORAL at 12:51

## 2023-07-21 RX ADMIN — Medication 5 SPRAY: at 16:42

## 2023-07-21 RX ADMIN — SIMETHICONE 80 MG: 80 TABLET, CHEWABLE ORAL at 16:42

## 2023-07-21 RX ADMIN — MAGNESIUM OXIDE TAB 400 MG (241.3 MG ELEMENTAL MG) 400 MG: 400 (241.3 MG) TAB at 08:59

## 2023-07-21 RX ADMIN — MORPHINE SULFATE 7.5 MG: 15 TABLET ORAL at 12:50

## 2023-07-21 RX ADMIN — BUPROPION HYDROCHLORIDE 100 MG: 100 TABLET, FILM COATED ORAL at 18:01

## 2023-07-21 RX ADMIN — DULOXETINE HYDROCHLORIDE 60 MG: 60 CAPSULE, DELAYED RELEASE ORAL at 18:00

## 2023-07-21 RX ADMIN — DICLOFENAC SODIUM 2 G: 10 GEL TOPICAL at 01:41

## 2023-07-21 RX ADMIN — MAGNESIUM OXIDE TAB 400 MG (241.3 MG ELEMENTAL MG) 400 MG: 400 (241.3 MG) TAB at 18:00

## 2023-07-21 RX ADMIN — SIMETHICONE 80 MG: 80 TABLET, CHEWABLE ORAL at 21:09

## 2023-07-21 RX ADMIN — GABAPENTIN 400 MG: 400 CAPSULE ORAL at 08:59

## 2023-07-21 RX ADMIN — DULOXETINE HYDROCHLORIDE 60 MG: 60 CAPSULE, DELAYED RELEASE ORAL at 08:59

## 2023-07-21 RX ADMIN — LIDOCAINE 5% 1 PATCH: 700 PATCH TOPICAL at 09:01

## 2023-07-21 RX ADMIN — APIXABAN 5 MG: 5 TABLET, FILM COATED ORAL at 18:00

## 2023-07-21 RX ADMIN — HYDROXYCHLOROQUINE SULFATE 200 MG: 200 TABLET ORAL at 16:43

## 2023-07-21 RX ADMIN — BUPROPION HYDROCHLORIDE 100 MG: 100 TABLET, FILM COATED ORAL at 09:00

## 2023-07-21 RX ADMIN — ONDANSETRON 4 MG: 4 TABLET, ORALLY DISINTEGRATING ORAL at 00:05

## 2023-07-21 RX ADMIN — PANTOPRAZOLE SODIUM 40 MG: 40 TABLET, DELAYED RELEASE ORAL at 05:19

## 2023-07-21 RX ADMIN — ONDANSETRON 4 MG: 4 TABLET, ORALLY DISINTEGRATING ORAL at 09:11

## 2023-07-21 RX ADMIN — LIDOCAINE 1 PATCH: 50 PATCH CUTANEOUS at 21:09

## 2023-07-21 RX ADMIN — MORPHINE SULFATE 7.5 MG: 15 TABLET ORAL at 02:04

## 2023-07-21 NOTE — PROGRESS NOTES
Internal Medicine Progress Note  Patient: Annika Baez  Age/sex: 70 y.o. female  Medical Record #: [id number]      ASSESSMENT/PLAN: (Interval History)  Annika Baez is seen and examined and management for following issues:    Left lateral thigh hematoma   • Stable     ABLA  • S/p 1 unit PRBCs 7/2/23  • CBC 7/18/23 stable     Fall  • While getting OOB 7/19  • Did not hit head but unwitnessed  • CTH/CT cerv spine with no acute changes  • Having low back pain and getting xrays today     Thrush  • S/p Nystatin     Chronic combined systolic/diastolic heart failure   • Originally diagnosed 2004 when LVEF then was 10-15% and felt to be NICM  • Her LVEF did recover into 50's then fell when she had her CVA in 2/2023 to 30%  • Also has moderate AI/gr 2 DD  • Sees Dr Severino Yang a [facility] in 2/2023 when LVEF was found to be 30% which is most current  • Had been on Coreg in past but HF team switched her to Toprol when she had her stroke 2/2023  • Her soft BPs have limited starting her on ACEI/ARB as OP  • Home:  Toprol XL 25mg BID/Aldactone 25mg qd  • Here:  Toprol XL 25 mg QD = held last 2 days 2/2 BPs   • D/C'd Aldactone 7/16  • euvolemic     Hypercalcemia  • Continue Cinacalcet here as was taking at home  • Calcium 8.9 on 7/18/23     HS hypoxia   • using 4L NC HS at home  • using O2 at night here     Nightmares  • placed on Minipress when here with us after her CVA  • D/w Dr Leticia Rojas, d/c'd since had been too low to get  • Minipress dc'd 2/2 hypotension     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  • Has residual left sided weakness  • Had recrudescence of CVA 4/2023 with worsening left facial droop  • Continue Eliquis     Dysphagia  • Had admission for aspiration PNA at [facility] 5/2023 tx'd with Levaquin and Flagyl  • Continue pureed diet with NTL as at home  • ST following     Weight loss  • Continue Ensure HP pudding TID and Magic cups TID  • She was on no supplements at home so likely will gain some wt that she is eating/drinking supps here  • adamantly refuses PEG tube  • Nutrition following     Bipolar depression  • Home:  Wellbutrin 100mg BID/Cymbalta 60mg BID/Lamictal 150mg qhs  • Here:  Wellbutrin 100mg BID/Cymbalta 60mg BID/Lamictal 150mg qhs     Rheumatoid arthritis  • Continue Plaquenil 200mg BID as at home     Chronic pain/chronic opioid use  • 2/2 RA and lumbar spondylosis  • Pain management per PMR     Hx incidental right superior hypophyseal artery aneurysm  • Follows with NS Dr Milly Genao     CRE urine 3/2023  • Got Cipro then to [facility] for tx at the time  • Maintain contact isolation     Dye allergy   • Was dye prepped on 7/4/23 with Benadryl 50mg IV x 1 w/o issues     Incidental findings  • Pancolitis with increasing inflammatory free fluid in pelvis  • Calcified granuloma RLL     Hypokalemia  • Got Kdur 40 meq x 2 on 7/8/23  • Stable at 4.1     Enterococcus UTI  • S/p Ottie Lighter Dr Depadua = sensitive  • UA for c/o dysuria again 7/20 = cx pending but isnt impressive for UTI        Discharge date:  7/21/23       The above assessment and plan was reviewed and updated as determined by my evaluation of the patient on 7/21/2023.     Labs:   Results from last 7 days   Lab Units 07/20/23  0643 07/18/23  0516   WBC Thousand/uL 4.78 5.07   HEMOGLOBIN g/dL 10.9* 9.9*   HEMATOCRIT % 36.2 30.9*   PLATELETS Thousands/uL 197 220     Results from last 7 days   Lab Units 07/20/23  0643 07/18/23  0516   SODIUM mmol/L 140 143   POTASSIUM mmol/L 4.1 4.3   CHLORIDE mmol/L 111* 109*   CO2 mmol/L 28 30   BUN mg/dL 17 18   CREATININE mg/dL 0.89 0.84   CALCIUM mg/dL 9.0 8.9                   Review of Scheduled Meds:  Current Facility-Administered Medications   Medication Dose Route Frequency Provider Last Rate   • acetaminophen  650 mg Oral Q6H [facility] Winn Severe, MD     • albuterol  2 puff Inhalation Q6H PRN Winn Severe, MD     • apixaban  5 mg Oral BID Winn Severe, MD     • buPROPion  100 mg Oral BID Alcus Anish Clarisa Hernández MD     • cinacalcet  30 mg Oral Daily With Breakfast Jeannette Golden MD     • Diclofenac Sodium  2 g Topical 4x Daily LAURA Rutledge     • DULoxetine  60 mg Oral BID Jeannette Golden MD     • gabapentin  400 mg Oral BID Jeannette Golden MD     • hydroxychloroquine  200 mg Oral BID With Meals Jeannette Golden MD     • lamoTRIgine  150 mg Oral HS Jeannette Golden MD     • lidocaine  1 patch Topical HS LAURA Rutledge     • magnesium Oxide  400 mg Oral BID Jeannette Golden MD     • melatonin  3 mg Oral HS Jeannette Golden MD     • metoprolol succinate  25 mg Oral Daily LAURA Lopez     • morphine  7.5 mg Oral BID PRN LAURA Stevens     • ondansetron  4 mg Oral Q6H PRN Jeannette Golden MD     • pantoprazole  40 mg Oral Early Morning Jeannette Golden MD     • polyethylene glycol  17 g Oral Daily PRN LAURA Rutledge     • polyethylene glycol  17 g Oral Once LAURA Rutledge     • simethicone  80 mg Oral 4x Daily (with meals and at bedtime) LAURA Geiger         Subjective/ HPI: Patient seen and examined. Patients overnight issues or events were reviewed with nursing or staff during rounds or morning huddle session. New or overnight issues include the following:     No new or overnight issues. C/o low back pain and having xrays today    ROS:   A 10 point ROS was performed; negative except as noted above.      *Labs /Radiology studies reviewed  *Medications reviewed and reconciled as needed  *Please refer to order section for additional ordered labs studies  *Case discussed with primary attending during morning huddle case rounds    Physical Examination:  Vitals:   Vitals:    07/21/23 0509 07/21/23 0855 07/21/23 1039 07/21/23 1401   BP: 98/58 98/56 100/60 106/54   BP Location: Right arm Left arm  Left arm   Pulse: 90 93  87   Resp: 18   18   Temp: 97.7 °F (36.5 °C)   98.2 °F (36.8 °C)   TempSrc: Axillary   Oral   SpO2: 100%   95%   Weight: 51.9 kg (114 lb 6.7 oz)      Height: General Appearance: no distress, conversive  HEENT: PERRLA, conjuctiva normal; oropharynx clear; mucous membranes moist   Neck:  Supple, normal ROM  Lungs: CTA, normal respiratory effort, no retractions, expiratory effort normal  CV: regular rate and rhythm; no rubs/murmurs/gallops, PMI normal   ABD: soft; ND/NT; +BS  EXT: no edema  Skin: normal turgor, normal texture, no rashes  Psych: affect normal, mood normal  Neuro: AAO      The above physical exam was reviewed and updated as determined by my evaluation of the patient on 7/21/2023. Invasive Devices     None                    VTE Pharmacologic Prophylaxis: Eliquis  Code Status: Level 1 - Full Code  Current Length of Stay: 14 day(s)      Total time spent:  30 minutes with more than 50% spent counseling/coordinating care. Counseling includes discussion with patient re: progress  and discussion with patient of his/her current medical state/information. Coordination of patient's care was performed in conjunction with primary service. Time invested included review of patient's labs, vitals, and management of their comorbidities with continued monitoring. In addition, this patient was discussed with medical team including physician and advanced extenders. The care of the patient was extensively discussed and appropriate treatment plan was formulated unique for this patient. Medical decision making for the day was made by supervising physician unless otherwise noted in their attestation statement. ** Please Note:  voice to text software may have been used in the creation of this document.  Although proof errors in transcription or interpretation are a potential of such software**

## 2023-07-21 NOTE — PLAN OF CARE
Reviewed    Problem: PAIN - ADULT  Goal: Verbalizes/displays adequate comfort level or baseline comfort level  Description: Interventions:  - Encourage patient to monitor pain and request assistance  - Assess pain using appropriate pain scale  - Administer analgesics based on type and severity of pain and evaluate response  - Implement non-pharmacological measures as appropriate and evaluate response  - Consider cultural and social influences on pain and pain management  - Notify physician/advanced practitioner if interventions unsuccessful or patient reports new pain  Outcome: Progressing     Problem: INFECTION - ADULT  Goal: Absence or prevention of progression during hospitalization  Description: INTERVENTIONS:  - Assess and monitor for signs and symptoms of infection  - Monitor lab/diagnostic results  - Monitor all insertion sites, i.e. indwelling lines, tubes, and drains  - Monitor endotracheal if appropriate and nasal secretions for changes in amount and color  - [place] appropriate cooling/warming therapies per order  - Administer medications as ordered  - Instruct and encourage patient and family to use good hand hygiene technique  - Identify and instruct in appropriate isolation precautions for identified infection/condition  Outcome: Progressing  Goal: Absence of fever/infection during neutropenic period  Description: INTERVENTIONS:  - Monitor WBC    Outcome: Progressing     Problem: SAFETY ADULT  Goal: Patient will remain free of falls  Description: INTERVENTIONS:  - Educate patient/family on patient safety including physical limitations  - Instruct patient to call for assistance with activity   - Consult OT/PT to assist with strengthening/mobility   - Keep Call bell within reach  - Keep bed low and locked with side rails adjusted as appropriate  - Keep care items and personal belongings within reach  - Initiate and maintain comfort rounds  - Make Fall Risk Sign visible to staff  - Offer Toileting every 4 Hours, in advance of need  - Initiate/Maintain bed and chair alarm  - Apply yellow socks and bracelet for high fall risk patients  - Consider moving patient to room near nurses station  Outcome: Progressing  Goal: Maintain or return to baseline ADL function  Description: INTERVENTIONS:  -  Assess patient's ability to carry out ADLs; assess patient's baseline for ADL function and identify physical deficits which impact ability to perform ADLs (bathing, care of mouth/teeth, toileting, grooming, dressing, etc.)  - Assess/evaluate cause of self-care deficits   - Assess range of motion  - Assess patient's mobility; develop plan if impaired  - Assess patient's need for assistive devices and provide as appropriate  - Encourage maximum independence but intervene and supervise when necessary  - Involve family in performance of ADLs  - Assess for home care needs following discharge   - Consider OT consult to assist with ADL evaluation and planning for discharge  - Provide patient education as appropriate  Outcome: Progressing  Goal: Maintains/Returns to pre admission functional level  Description: INTERVENTIONS:  - Set and communicate daily mobility goal to care team and patient/family/caregiver.    - Collaborate with rehabilitation services on mobility goals if consulted  - Out of bed for toileting  - Record patient progress and toleration of activity level   Outcome: Progressing     Problem: DISCHARGE PLANNING  Goal: Discharge to home or other facility with appropriate resources  Description: INTERVENTIONS:  - Identify barriers to discharge w/patient and caregiver  - Arrange for needed discharge resources and transportation as appropriate  - Identify discharge learning needs (meds, wound care, etc.)  - Arrange for interpretive services to assist at discharge as needed  - Refer to Case Management Department for coordinating discharge planning if the patient needs post-hospital services based on physician/advanced practitioner order or complex needs related to functional status, cognitive ability, or social support system  Outcome: Progressing     Problem: Prexisting or High Potential for Compromised Skin Integrity  Goal: Skin integrity is maintained or improved  Description: INTERVENTIONS:  - Identify patients at risk for skin breakdown  - Assess and monitor skin integrity  - Assess and monitor nutrition and hydration status  - Monitor labs   - Assess for incontinence   - Turn and reposition patient  - Assist with mobility/ambulation  - Relieve pressure over bony prominences  - Avoid friction and shearing  - Provide appropriate hygiene as needed including keeping skin clean and dry  - Evaluate need for skin moisturizer/barrier cream  - Collaborate with interdisciplinary team   - Patient/family teaching  - Consider wound care consult   Outcome: Progressing     Problem: Nutrition/Hydration-ADULT  Goal: Nutrient/Hydration intake appropriate for improving, restoring or maintaining nutritional needs  Description: Monitor and assess patient's nutrition/hydration status for malnutrition. Collaborate with interdisciplinary team and initiate plan and interventions as ordered. Monitor patient's weight and dietary intake as ordered or per policy. Utilize nutrition screening tool and intervene as necessary. Determine patient's food preferences and provide high-protein, high-caloric foods as appropriate.      INTERVENTIONS:  - Monitor oral intake, urinary output, labs, and treatment plans  - Assess nutrition and hydration status and recommend course of action  - Evaluate amount of meals eaten  - Assist patient with eating if necessary   - Allow adequate time for meals  - Recommend/ encourage appropriate diets, oral nutritional supplements, and vitamin/mineral supplements  - Order, calculate, and assess calorie counts as needed  - Recommend, monitor, and adjust tube feedings and TPN/PPN based on assessed needs  - Assess need for intravenous fluids  - Provide specific nutrition/hydration education as appropriate  - Include patient/family/caregiver in decisions related to nutrition  Outcome: Progressing     Problem: MOBILITY - ADULT  Goal: Maintain or return to baseline ADL function  Description: INTERVENTIONS:  -  Assess patient's ability to carry out ADLs; assess patient's baseline for ADL function and identify physical deficits which impact ability to perform ADLs (bathing, care of mouth/teeth, toileting, grooming, dressing, etc.)  - Assess/evaluate cause of self-care deficits   - Assess range of motion  - Assess patient's mobility; develop plan if impaired  - Assess patient's need for assistive devices and provide as appropriate  - Encourage maximum independence but intervene and supervise when necessary  - Involve family in performance of ADLs  - Assess for home care needs following discharge   - Consider OT consult to assist with ADL evaluation and planning for discharge  - Provide patient education as appropriate  Outcome: Progressing  Goal: Maintains/Returns to pre admission functional level  Description: INTERVENTIONS:  - Set and communicate daily mobility goal to care team and patient/family/caregiver. - Collaborate with rehabilitation services on mobility goals if consulted  - Out of bed for toileting  - Record patient progress and toleration of activity level   Outcome: Progressing     Problem: Nutrition/Hydration-ADULT  Goal: Nutrient/Hydration intake appropriate for improving, restoring or maintaining nutritional needs  Description: Monitor and assess patient's nutrition/hydration status for malnutrition. Collaborate with interdisciplinary team and initiate plan and interventions as ordered. Monitor patient's weight and dietary intake as ordered or per policy. Utilize nutrition screening tool and intervene as necessary. Determine patient's food preferences and provide high-protein, high-caloric foods as appropriate. INTERVENTIONS:  - Monitor oral intake, urinary output, labs, and treatment plans  - Assess nutrition and hydration status and recommend course of action  - Evaluate amount of meals eaten  - Assist patient with eating if necessary   - Allow adequate time for meals  - Recommend/ encourage appropriate diets, oral nutritional supplements, and vitamin/mineral supplements  - Order, calculate, and assess calorie counts as needed  - Recommend, monitor, and adjust tube feedings and TPN/PPN based on assessed needs  - Assess need for intravenous fluids  - Provide specific nutrition/hydration education as appropriate  - Include patient/family/caregiver in decisions related to nutrition  Outcome: Progressing

## 2023-07-21 NOTE — NUTRITION
Calorie Count Day 1 Results  Estimated intake: 1110 kcal and 40g protein.    Meets 69% of estimated calorie needs and 57% of estimated protein needs      Sid Veras

## 2023-07-21 NOTE — PROGRESS NOTES
07/21/23 0800   Pain Assessment   Pain Assessment Tool 0-10   Pain Score No Pain   Restrictions/Precautions   Precautions Aspiration;Bed/chair alarms;Cognitive; Fall Risk;Impulsive;Contact/isolation;Pain;Supervision on toilet/commode  (virtual 1:1)   Eating   Type of Assistance Needed Set-up / clean-up;Supervision;Verbal cues   Physical Assistance Level No physical assistance   Eating CARE Score 4   Swallow Assessment   Swallow Treatment Assessment   Daily Dysphagia Tx Note     Patient Name: Sravani Castaneda    GGIJH'L Date: 7/21/2023      Current Risks for Dysphagia & Aspiration: Weak voicing; weak cough; dysarthria; general debilitation; new neuro event; brain injury; cognitive deficit; hx neurologic dx; positioning issues     Current Symptoms/Concerns: difficulty chewing; decreased oral intake; hx of dysphagia/aspiration; throat clearing; cough with intake     Current diet:puree/level 1 diet and nectar thick liquids      Premorbid diet::puree/level 1 diet and nectar thick liquids     Positioning: pt in WC for meal    Items administered:Consistencies Administered: nectar thick, puree, soft solids and mixed consistency  Materials administered included : rice crispies w/ NT milk, pureed Romansh toast, pureed eggs, magic cup, NTL by cup    Total amount of meal consumed:   75% of pureed Romansh toast  100% pureed eggs  50% of rice crispies w/ NT milk (60cc)  75% of magic cup   90cc of NTL cup      Oral stage:mild-moderate  Lip closure: decreased around tsp given mixed consistency trial (cereal w/ NT milk) only  Anterior spillage: mild amount from above  Mastication: moderately prolonged  Bolus formation: decreased  Bolus control: decreased  Transfer: mild-moderately delayed  Oral residue: L>R given soft solids and purees  Pocketing: none         Pharyngeal stage:moderate  Swallow promptness: moderately delayed today  Hyolaryngeal elevation: continues to be decreased    Wet voice: increased near end of meal  Throat clear: lessened today  Cough: weaker coughs produced near end of meal   Secondary swallows: consistent throughout meal  Audible swallows: noted toward end of meal       Esophageal stage:mild-moderate:  increased belching and suspect some level of reflux near end of meal due to increased wet voicing and weaker coughs towards end of meal.       Summary:     Pt presenting with mild-moderate oral and moderate pharyngeal dysphagia today. Symptoms or concerns included decreased bolus acceptance, decreased bolus propulsion, decreased mastication, decreased bolus formation, delayed oral intiation, suspected decreased control of NTL by cup , oral residue with soft solids, some purees , residue effectively cleared with increased time and liquid wash and anterior loss/spillage of cold cereal suspected pharyngeal swallow delay, suspected decreased hyolaryngeal elevation upon palpation, suspected pharyngeal residue, multiple swallows, effortful swallow, audible swallows and dealyed spontaneous throat clear after consumption of cereal w/ NT milk, delayed cough after dry pureed x1, but pt did elicit more wet voicing and weak coughing elicited at end of meal.  Pt also noted to exhibit increased belching and suspect some level of reflux near end of meal due to increased wet voicing and weaker coughs. Pt was observed to be more fatigued today which is was noted throughout meal as pt's oral manipulation and formulation given dry purees was more prolonged due to fatigue, as pt did require verbal cues to maintain alertness given meal. Despite that, ability to carryover swallow strategies was fairly consistent given meal today. Pt did have decreased lip seal when presenting tsp of cold cereal w/ NT milk which pt did have mild amounts of L anterior loss consistently throughout this texture consumed. Lip seal was improved around tsp/fork given purees and cup sips.  Mastication given soft solids was prolonged and overall formulation was decreased which lead to mild amounts of oral residual L>R side but when allowing increased time, pt able to clear on own. As for manipulation given purees, today was observed to be moderately prolonged and again decreased formulation at times which lead to mor L sided residual but still pt able to clear accordingly. Pt did complete lingual sweeps and use of liquid wash to clear residuals. Initially when taking sips of NT transfer was mildly delayed but as session progressed, moderate delay was observed. Given this delay as well as noted delay in swallow initiation, pt did exhibit increased coughing towards end of meal due to fatigue and attention during meal. Hyolaryngeal excursion remains to be decreased where pt does continue to elicit multiple swallows, effortful swallows per boluses. Audible swallow observed towards end of meal, also likely due to fatigue. Pt did exhibit belching mid meal and toward end of meal today. More noted towards end of meal and wet voicing and weaker coughing, which SLP had to prompt pt to produce strong coughs, where pt did expel mucous x1. Recommendations:  Diet: puree/level 1 diet and nectar thick liquids  Meds: whole or cut with puree  Strategies: upright posture, only feed when fully alert, slow rate of feeding, small bites/sips, effortful swallow, quiet environment (tv off, limit talking, door closed, etc.) and alternating bites and sips     FULL supervision w/ meals. Results reviewed with: patient, RN  Aspiration precautions posted. F/u ST tx: Pt is currently recommended for further skilled SLP services targeting dysphagia therapy in order to maximize oral and pharyngeal swallow skills, while safely supporting PO intake, as well as to improve independent carryover of safe swallow strategies.       Plan: Cont to monitor pt on current diet              Trial upgraded textures of dysphagia level 2 and thin liquids as appropriate              Will benefit from f/u with GI team which pt follows with [facility] given esophageal concerns. Cont education on puree diet and NTL with strategies for at home (education folder started in room with handouts)   Swallow Assessment Prognosis   Prognosis Fair   Prognosis Considerations Age; Co-morbidities; Family/community support;Medical prognosis; Medical diagnosis;Participation level;Previous level of function;Severity of impairments;New learning ability;Ability to carry over   SLP Therapy Minutes   SLP Time In 0800   SLP Time Out 0840   SLP Total Time (minutes) 40   SLP Mode of treatment - Individual (minutes) 40   SLP Mode of treatment - Concurrent (minutes) 0   SLP Mode of treatment - Group (minutes) 0   SLP Mode of treatment - Co-treat (minutes) 0   SLP Mode of Treatment - Total time(minutes) 40 minutes   SLP Cumulative Minutes 515   Therapy Time missed   Time missed?  No

## 2023-07-21 NOTE — PROGRESS NOTES
07/21/23 1000   Pain Assessment   Pain Assessment Tool 0-10   Pain Score 7  (with activity)   Pain Location/Orientation Location: Back   Effect of Pain on Daily Activities limited activity tolerance. requiring more frequent rest breaks. limited ambulation distance   Restrictions/Precautions   Precautions Aspiration;Bed/chair alarms; Fall Risk;Cognitive;1:1;Impulsive;Pain;Supervision on toilet/commode  (virtual 1:1)   Weight Bearing Restrictions No   Cognition   Overall Cognitive Status Impaired   Arousal/Participation Alert; Cooperative   Attention Attends with cues to redirect   Orientation Level Oriented X4   Memory Decreased short term memory;Decreased recall of recent events;Decreased recall of precautions   Following Commands Follows one step commands with increased time or repetition   Subjective   Subjective Patient reports worsening low back pain. Sit to Lying   Type of Assistance Needed Physical assistance   Physical Assistance Level 25% or less   Comment for managmenet of trunk and LEs.    Sit to Lying CARE Score 3   Lying to Sitting on Side of Bed   Type of Assistance Needed Physical assistance   Physical Assistance Level 25% or less   Comment for management of trunk and LEs   Lying to Sitting on Side of Bed CARE Score 3   Sit to Stand   Type of Assistance Needed Physical assistance   Physical Assistance Level 25% or less   Comment Fei with RW   Sit to Stand CARE Score 3   Bed-Chair Transfer   Type of Assistance Needed Physical assistance   Physical Assistance Level 25% or less   Comment Fei with RW   Chair/Bed-to-Chair Transfer CARE Score 3   Car Transfer   Type of Assistance Needed Physical assistance   Physical Assistance Level 25% or less   Comment Fei for management of LEs   Car Transfer CARE Score 3   Walk 10 Feet   Type of Assistance Needed Incidental touching   Comment CGA with RW   Walk 10 Feet CARE Score 4   Walk 50 Feet with Two Turns   Type of Assistance Needed Incidental touching Comment CGA with RW   Walk 50 Feet with Two Turns CARE Score 4   Walk 150 Feet   Comment unable to ambulate longer distances secondary to low back pain. Reason if not Attempted Safety concerns   Walk 150 Feet CARE Score 88   Ambulation   Primary Mode of Locomotion Prior to Admission Walk   Distance Walked (feet) 130 ft  (x2, 124 ft)   Assist Device Roller Walker   Gait Pattern Slow Amy;Decreased foot clearance; Forward Flexion;Narrow AYLA;Step to; Step through; Improper weight shift   Limitations Noted In Balance; Endurance;Speed;Strength; Safety;Posture   Provided Assistance with: Balance;Weight Shift   Walk Assist Level Contact Guard   Findings VC to stay within RW frame and for upright posture   Does the patient walk? 2. Yes   Wheel 50 Feet with Two Turns   Reason if not Attempted Activity not applicable   Wheel 50 Feet with Two Turns CARE Score 9   Wheel 150 Feet   Reason if not Attempted Activity not applicable   Wheel 364 Feet CARE Score 9   Curb or Single Stair   Style negotiated Curb   Type of Assistance Needed Physical assistance   Physical Assistance Level 25% or less   Comment 6 inch curb with RW, requires physical assistance for management of RW   1 Step (Curb) CARE Score 3   4 Steps   Reason if not Attempted Safety concerns   4 Steps CARE Score 88   12 Steps   Reason if not Attempted Safety concerns   12 Steps CARE Score 88   Toilet Transfer   Type of Assistance Needed Physical assistance   Physical Assistance Level 25% or less   Comment RW, VC for hand placement   Toilet Transfer CARE Score 3   Therapeutic Interventions   Strengthening seated knee extension 1.5# 2x15 each LE, seated hamstring curls with YTB 2x15, seated marching 1.5# 2x15, seated hip abduction with YTB 2x15, seated hip adduction with YTB 2x15   Assessment   Treatment Assessment Patient participated in 90 minute skilled physical therapy session focusing on transfers, gait training, and LE strengthening.  Patient reporting increased back pain with activity requiring frequent rest breaks. Patient to get additional Xrays due to increase in low back pain. Patient experiences most pain with transitional movements, like STS, and now requires Fei. Longer distance ambulation is limite by pain as well. FT to occur on Monday, 7/24. PT to continue to work on single step/curb management as she has 1 TAB the home, bed mobility, transfers, gait training, and LE strengthening. Problem List Decreased strength;Decreased range of motion;Decreased endurance; Impaired balance;Decreased mobility; Decreased coordination;Decreased safety awareness; Impaired vision   Barriers to Discharge Inaccessible home environment;Decreased caregiver support   PT Barriers   Physical Impairment Decreased strength;Decreased range of motion;Decreased endurance; Impaired balance;Decreased mobility; Decreased coordination;Decreased cognition; Impaired judgement;Decreased safety awareness; Impaired vision;Orthopedic restrictions;Pain   Functional Limitation Car transfers;Stair negotiation;Standing;Transfers; Walking   Plan   Treatment/Interventions LE strengthening/ROM; Functional transfer training; Therapeutic exercise; Endurance training;Patient/family training;Equipment eval/education; Bed mobility;Gait training   Progress   (recent set back due to increased pain following fall. Progressing slowly)   Recommendation   PT Discharge Recommendation Home with home health rehabilitation   Equipment Recommended Walker   PT Therapy Minutes   PT Time In 1000   PT Time Out 1130   PT Total Time (minutes) 90   PT Mode of treatment - Individual (minutes) 90   PT Mode of treatment - Concurrent (minutes) 0   PT Mode of treatment - Group (minutes) 0   PT Mode of treatment - Co-treat (minutes) 0   PT Mode of Treatment - Total time(minutes) 90 minutes   PT Cumulative Minutes 843   Therapy Time missed   Time missed?  No     Alexander Flirt, PT, DPT

## 2023-07-21 NOTE — PLAN OF CARE
Problem: INFECTION - ADULT  Goal: Absence or prevention of progression during hospitalization  Description: INTERVENTIONS:  - Assess and monitor for signs and symptoms of infection  - Monitor lab/diagnostic results  - Monitor all insertion sites, i.e. indwelling lines, tubes, and drains  - Monitor endotracheal if appropriate and nasal secretions for changes in amount and color  - [place] appropriate cooling/warming therapies per order  - Administer medications as ordered  - Instruct and encourage patient and family to use good hand hygiene technique  - Identify and instruct in appropriate isolation precautions for identified infection/condition  Outcome: Progressing

## 2023-07-21 NOTE — PROGRESS NOTES
OT Daily Treatment Note       07/21/23 0700   Pain Assessment   Pain Assessment Tool 0-10   Pain Score 7   Pain Location/Orientation Orientation: Mid;Location: Back   Hospital Pain Intervention(s) Heat applied  (aqua heat back applied at end of session on 20 min cycle)   Restrictions/Precautions   Precautions Aspiration;Bed/chair alarms; Fall Risk;Cognitive;1:1;Impulsive;Pain;Supervision on toilet/commode   Lifestyle   Autonomy "I definitely feel like im worse since falling"   Shower/Bathe Self   Type of Assistance Needed Physical assistance   Physical Assistance Level 25% or less   Comment SB seated EOB; assistance for posterior bathing in stance 2* inc mid back pain   Shower/Bathe Self CARE Score 3   Bathing   Assessed Bath Style Sponge Bath   Tub/Shower Transfer   Reason Not Assessed Sponge Bath   Upper Body Dressing   Type of Assistance Needed Physical assistance   Physical Assistance Level 51%-75%   Comment pt req inc assistance today 2* mid back pain when attempting to don overhead   Upper Body Dressing CARE Score 2   Lower Body Dressing   Type of Assistance Needed Physical assistance   Physical Assistance Level 51%-75%   Comment seated EOB; pt req inc assistance today 2* inc mid back pain in stance when attemping to don over hips.  assistance to thread over LLE required at baseline   Lower Body Dressing CARE Score 2   Putting On/Taking Off Footwear   Type of Assistance Needed Physical assistance   Physical Assistance Level 51%-75%   Comment pt cont to require A to don B/L socks 2* LUE weakness, pt able to doff socks with R hand   Putting On/Taking Off Footwear CARE Score 2   Lying to Sitting on Side of Bed   Type of Assistance Needed Incidental touching   Physical Assistance Level No physical assistance   Comment CG with inc time 2* rosalva mid back pain   Lying to Sitting on Side of Bed CARE Score 4   Sit to Stand   Type of Assistance Needed Physical assistance   Physical Assistance Level 25% or less   Comment Min A with RW 2* inc mid back pain   Sit to Stand CARE Score 3   Bed-Chair Transfer   Type of Assistance Needed Physical assistance   Physical Assistance Level 25% or less   Comment min A SPT with RW 2* inc mid back pain   Chair/Bed-to-Chair Transfer CARE Score 3   Therapeutic Excerise-Strength   UE Strength Yes   Right Upper Extremity- Strength   R Shoulder Flexion;ABduction; Extension; External rotation; Internal rotation   R Elbow Elbow flexion;Elbow extension   R Position Seated   Equipment Dumbbell  (1#)   R Weight/Reps/Sets 10 x 1   RUE Strength Comment Pt completed seated UE therapeutic exercise to increase/maintain pts ROM, increase flexibility, increase strength and promote endurance in order to increase independence and safety with ADL completion and functional transfers. Pt is able to safely complete all exercises w/ no C/O pain and self perceived exertion within personal tolerance. Left Upper Extremity-Strength   LUE Strength Comment see above   Cognition   Overall Cognitive Status Impaired   Arousal/Participation Alert; Cooperative   Attention Attends with cues to redirect   Orientation Level Oriented X4   Memory Decreased short term memory;Decreased recall of recent events;Decreased recall of precautions   Following Commands Follows one step commands with increased time or repetition   Comments pt with inc confusion at start of session. time spent reorienting which appeared to help. Additional Activities   Additional Activities Comments edu pt re: new DC date of 7/25 2* inc assistance req s/p fall. pt verbalized understanding and in agreeance to receive cont therapy services until pt is at baseline fxing. Activity Tolerance   Activity Tolerance Patient limited by pain   Assessment   Treatment Assessment Pt participated in skilled OT session with focus on ADL retraining, functional transfer training, UE strengthening/ROM and continued education.  See flowsheet for details of session and current functional status. Pt is limited by weakness, decreased ROM, impaired balance, increased fall risk, pain, decreased activity tolerance, decreased safety awareness, impaired judgement, decreased cognition and decreased strength and requires skilled OT services to increase independence and safety with ADL completion in prep for DC home. Plan to continue ADL retraining, functional transfer training, UE strengthening/ROM, endurance training, cognitive reorientation, compensatory technique education, continued education, energy conservation and activity engagement  to address barriers mentioned above. Plan to continue focusing on ADL tasks with inc usage of LUE and L visual attention to increase independence with dressing tasks in prep for DC home. Prognosis Fair   Problem List Decreased strength;Decreased range of motion;Decreased endurance; Impaired balance;Decreased mobility; Decreased coordination;Decreased safety awareness; Impaired vision   Barriers to Discharge Inaccessible home environment;Decreased caregiver support   Plan   Treatment/Interventions ADL retraining;Functional transfer training; Therapeutic exercise; Endurance training;Patient/family training; Compensatory technique education   OT Therapy Minutes   OT Time In 0700   OT Time Out 0800   OT Total Time (minutes) 60   OT Mode of treatment - Individual (minutes) 60   OT Mode of treatment - Concurrent (minutes) 0   OT Mode of treatment - Group (minutes) 0   OT Mode of treatment - Co-treat (minutes) 0   OT Mode of Treatment - Total time(minutes) 60 minutes   OT Cumulative Minutes 1058   Therapy Time missed   Time missed?  No

## 2023-07-21 NOTE — PROGRESS NOTES
PM&R PROGRESS NOTE:  Niesha Dunn 70 y.o. female MRN: [id number]  Unit/Bed#: -01 Encounter: [id number]    Rehabilitation Diagnosis: Impairment of mobility, safety, Activities of Daily Living (ADLs), and cognitive/communication skills due to Other Disabling Impairments:  13  Other Disabling Impairments    HPI: Niesha Dunn is a 70 y.o. female with medical history of HTN, CVA in 2/2023 on eliquis (chronic dysphagia, L sided weakness, cognitive impairment), hypercalcemia,  CHF, GERD, SHAR, gout, bipolar disorder, RA on plaquenil, chronic opioid dependence on morphine for chronic back/joint pain, who presented to the [address] [place] [address] [address] on 6/30 after a fall, unable to get up and disoriented. She thinks she tripped and fell down her porch steps, landing on he L thigh. Found to have a L lateral thigh hematoma with evidence of extravasation on imaging. Binder placed over hips, and eliquis was held but not reversed given her recent stroke. Speech consulted and noted oropharyngeal swallow likely at baseline and recommended Level 1/NTL. Recommended for VBS. Course c/b ABLA on 7/2 Hgb dropped to 6.8 and she was given 1 unit pRBC. Geriatrics was consulted, noted at baseline she is oriented but forgetful, with worsening issues since her stroke. Mini-Cog was 9/15 in 2021. They recommended checking B12. They noted she has not been on lorazepam since January, and also recommended weaning down off gabapentin if possible. On 7/4 patient had an unwitnessed fall while trying to reach for her bag on the window sill. No head strike, but landed on her L hip. GCS 15, Neuro exam was baseline. CTA ordered to look for active bleeding, which showed decreased size of hematoma with no active extravasation. Hgb did drop after her transfusion, but seems to have stablized today at 8.5.  That being said, her HR was elevated today, and it appears a troponin has been ordered by the trauma team as well as a CXR. CXR showed hazy RLL opacity, likely 2/2 atelectasis, and trops trended down. Metoprolol was increased with improvement. She was admitted to the [facility] on 7/7. SUBJECTIVE: Patient seen face to face. No acute issues overnight. Patient reports continued thoracic and lumbar pain, worse with movement from sitting to standing position. She has increased somnolence today. Morphine sulfate IR 7.5 mg TID ordered, however patient had 2 doses each day for past 2 days; this is her home dose. Discussed repeat imaging of lumbar and thoracic spine. Appetite remains poor, voiding, LBM     Patient seen face to face in wheelchair. No acute issues overnight. Patient reports increased coccyx pain since fall onto buttocks yesterday afternoon. Imaging showed no acute osseous abnormalities. Chronic pain stable, current acute pain level 6/10. Pain regimen increased to three times a day prn s/p fall. There is a decline in functional status and therapy recommending discharge be postponed until 7/25 with reassessment on Monday 7/25. Patient in agreement. Dr. Perico Hatfield spoke with daughter, Melanie Edwards. She will have family training on Monday 7/25. Poor meal completion, voiding- reports burning with urination, LBM 7/17. Denies chest pain, shortness of breath, fever, chills, N/V, abdominal pain. ASSESSMENT: Stable, progressing    PLAN:  - thoracic and lumbar back pain s/p fall- XR pending  - dysuria: UA with WBC 10-20, culture pending  - poor appetite: encourage meal completion, nutritionist following, calorie count, encourage dietary supplements between meals  - constipation: ordered Miralax once 7/21  - pain: decreased MSIR BID (home regimen)    Rehabilitation  • Functional deficits:  cognitive impairment, self-care, impaired mobility  • Continue current rehabilitation plan of care to maximize function.    • Functional update:   • PT: mobility- min A, transfers- min A, ambulation- min A, stairs BHR 6  • OT: ADL: bathing- min A, dressing- mod-max A, footwear- mod-max A, toileting- min A  • SLP: dysphagia puree/NTL, impaired cognition, decreased memory, decreased executive function, decreased insight and attention  • Estimated Discharge: anticipated 7/25 with home PT/OT    Pain  • Tylenol 975 mg every 8 hours  • Lidocaine patch  • Gabapentin 400 mg BID  • Diclofenac Sodium topical gel QID prn  • Oxycodone 2.5-5 mg every 4 hours prn    DVT prophylaxis  • Eliquis    Bladder plan  • Continent    Bowel plan  • Continent  • Miralax daily prn      * Leg hematoma, left, initial encounter  Assessment & Plan  2/2 fall    6/30 CT CAP:  Large hematoma in the lateral left thigh measuring up to 9.6 cm with focus of active contrast extravasation. No acute intrathoracic or intra-abdominal injury. 7/4 CTA Pelvis:  1. Decreasing size of left lateral thigh hematoma without evidence for active extravasation. 2.  No CT findings in the visualized pelvis and thighs to explain falling hematocrit. 3.  Pancolitis with increasing inflammatory free fluid in the pelvis. No free air, pneumatosis, or encapsulated collections demonstrated. Non-operatively managed. Did require transfusion x1  Pain management as below  Monitor Hgb. PT/OT 3-5 hours/day, 5-7 days/week  Outpatient f/u with General Surgery      History of stroke  Assessment & Plan  MRI brain 2/22 - 1.  New small acute /more recent interval lacunar infarction in the right cerebral peduncle (series 4, image 14). 2.  Extensive multifocal large, previously present right middle cerebral artery infarction which is evolving with superimposed areas of evolving hemorrhage likely related to hemorrhagic transformation of subacute right MCA infarction.  No significant   mass effect. 3.  Mild, chronic microangiopathy. S/P thrombectomy 2/12 by Dr Aleyda Garcia   Secondary stroke prophylaxis at this time with Eliquis 5 mg twice daily   PT, OT, SLP.    Review modifiable risk factors and provide stroke education  Monitor for poststroke pain  Eliquis for secondary stroke prophylaxis    Outpatient f/u with Neurology    CHF (congestive heart failure) ([address])  Assessment & Plan  Wt Readings from Last 3 Encounters:   07/21/23 51.9 kg (114 lb 6.7 oz)   06/30/23 55.3 kg (122 lb)   06/06/23 47.9 kg (105 lb 9.6 oz)     2/2023 Echo 30% with G2DD, moderate AI  Home: Toprol XL 25mg daily, Aldactone 25mg daily   - Limited by BP - no ACE/ARB   - no SGLT2i due to recurrent UTI  Here: Toprol XL 25mg BID, spironolactone 25mg daily   Unclear etiology of cardiomyopathy  Possible consideration for ischemic work-up outpatient, poor candidate for aggressive procedures   Monitor I/O/daily weights, volume status, lytes. IM consulted to assist with management  Outpatient f/u with Dr. Edwards Proper      Dysphagia  Assessment & Plan  2/2 previous CVA earlier this year. Noted to have impaired oropharyngeal swallow  Was on free water protocol at home - holding for now due to thrush  On Level 1/NTL  - Inpatient SLP recommended VBS  - Consulting SLP here  - Aspiration precautions  - Strict oral hygiene prior to trials  - esophagogram scheduled for 7/19, unable to complete d/t Aspiration risk    Urinary tract infection without hematuria  Assessment & Plan  Present on admission  With incontinence and confusion  Grew 100k CFU Enterococcus    - Started on Macrobid based on antibiogram on 7/9   - sensitivity confirms susceptibility  Macrobid complete, monitor for signs of reoccurrence  - 7/20 reports burning with urination, awaiting UA w/reflex, WBC 10-20   - 7/21 - urine culture pending    Mixed stress and urge urinary incontinence  Assessment & Plan  Frankly incontinent here  Initiate timed voids and scans  Has history of recurrent UTIs - will check UA on admission here given extent of incontinence   - Had confusion on 7/8-7/9 overnight. - See UTI for details.    Close continence care      Anemia  Assessment & Plan  2/2 L leg hematoma (most recent imaging negative for extravasation)  At home on eliquis  No reversal inpatient due to recent CVA  Prior to admission Hgb 10-12. Ravi 7/2 6.8 - given 1 unit pRBC  7/8 Hgb 8.5 > 9.2 > 9.3 > 10.1 > 9.9 > 10.9  Monitor, transfuse as appropriate. IM consulted to assist with management    Cervical dystonia  Assessment & Plan  Recommend outpatient f/u with Dr. Wilmar Parrish to evaluate for botulinum toxin injections  Appears to have a retrocollis. Sundowning  Assessment & Plan  Does wake up confused at night, but is easily redirectable  - More confusion, than agitation  - Wandered all the way down to the cafeteria on the 1st floor on 7/9  - No injuries  - Brought back to her room  - UCx with 100k enterococcus. - treat UTI  - Low bed, Q15hr checks overnight, Sleep logs  - Floor mats. - Delirium precautions    Night terrors  Assessment & Plan  Home: Prazosin  Here: none  Continue melatonin  Outpatient f/u with PCP    Thrush  Assessment & Plan  Noted on admission  7/7 started on nystatin swish/spit  - 7/11 improving  - Continue for at least 7 days with 2-3 days after her antibiotics stop  - completed, resolved    Hypomagnesemia  Assessment & Plan  Home: Mag oxide 800mg BID  Here: Mag oxide 400mg BID  7/9 Mag 1.9   Goals > 2. Sinus tachycardia  Assessment & Plan  Home: Toprol XL 25mg BID  Here: Same  On 7/6 with HR in 115-120. EKG showed sinus tach. Trops trended and negative. - Associated with transient/mild SOB   - She was only being given Toprol daily in the hospital   - Increased to BID with improvement  Lytes with K > 4, Mag > 2  Monitor and adjust regimen as appropriate  IM consulted to assist with management  Outpatient f/u with Dr. Aiken Late    Obstructive sleep apnea  Assessment & Plan  Previously non-compliant with hospital or home CPAP  Outpatient f/u with PCP.     Hypercalcemia  Assessment & Plan  Suspect 2/2 primary hyperparathyroidism  7/8 iCal 1.12, Phos 3.3  Home: Sensipar 30mg daily  Here: Same  Recommend outpatient f/u with Endocrinology      HTN (hypertension)  Assessment & Plan  Tends to run on the low/normal side. Home: Toprol XL 25mg daily, Spironolactone 25mg daily  Here: Same  Has not been able to get meds at times due to not meeting parameters. Monitor and adjust as appropriate  - Spironolactone held 7/15 and d/c 7/16  IM consulted to assist with management    Bipolar depression [facility])  Assessment & Plan  Home: Bupropion 100mg BID, Cymbalta 60mg BID, Ativan 0.25mg BID PRN (however this has not been filled since January 2023 as per PDMP)  Here: Bupropion 100mg BID, Cymbalta 60mg BID  Consulting rehab psychology for support  Adjust as appropriate  Outpatient f/u with PCP    Severe protein-calorie malnutrition ([address])  Assessment & Plan  Malnutrition Findings:                                 BMI Findings: Body mass index is 20.89 kg/m². Consulted nutrition    Fall  Assessment & Plan  - s/p slip fall onto buttocks 7/19  - did not ask for assistance  - imaging showed no acute osseous abnormalities  - acute coccyx pain   - MSIR 7.5 mg TID  - Voltaren gel prn    Osteoporosis  Assessment & Plan  Hx L1 compression fx s/p kyphoplasty  Was previously on Prolia - last given in 2020   - Held due to dental issues  Recommend outpatient f/u with Endocrinology  2/23 Vitamin D level is 41.7    Gastroesophageal reflux disease without esophagitis  Assessment & Plan  Home: PPI daily  Here: PPI daily    Seronegative arthropathy of multiple sites [facility])  Assessment & Plan  Follows with Dr. Zaira Ashraf with Rheum and is on Hydroxychloroquine 200mg BID. Continue home regimen and follow-up with rheumatology on discharge    Mild intermittent asthma without complication  Assessment & Plan  No acute exacerbation  Home: Albuterol PRN  Here: Same    Chronic pain syndrome  Assessment & Plan  Joint and back pain  Follows with Dr. Haresh Ramos with [facility]  Reviewed PDMP - at home on MSIR 7.5mg BID PRN.   Here: MSIR 7.5mg BID PRN  - Outpatient f/u with Dr. Vernon Hennessy. Appreciate IM consultants medical co-management. Personally reviewed labs, medications, and imaging. ROS:  Review of Systems   A 10 point review of systems was negative except for what is noted in the HPI. OBJECTIVE:   /60   Pulse 93   Temp 97.7 °F (36.5 °C) (Axillary)   Resp 18   Ht 5' 3" (1.6 m)   Wt 51.9 kg (114 lb 6.7 oz)   SpO2 100%   BMI 20.27 kg/m²     Physical Exam  Constitutional:       Appearance: Normal appearance. HENT:      Head: Normocephalic and atraumatic. Nose: Nose normal.      Mouth/Throat:      Mouth: Mucous membranes are moist.   Cardiovascular:      Rate and Rhythm: Normal rate and regular rhythm. Pulses: Normal pulses. Heart sounds: Normal heart sounds. Pulmonary:      Effort: Pulmonary effort is normal.      Breath sounds: Normal breath sounds. Abdominal:      General: Bowel sounds are normal.      Palpations: Abdomen is soft. Musculoskeletal:         General: Tenderness, deformity and signs of injury (thoracic and lumbar pain s/p fall) present. Normal range of motion. Cervical back: Normal range of motion. Comments: +kyphosis, scoliosis   Skin:     General: Skin is warm and dry. Capillary Refill: Capillary refill takes less than 2 seconds. Findings: Bruising present. Neurological:      Mental Status: She is alert and oriented to person, place, and time. Motor: Weakness present.       Coordination: Coordination abnormal.   Psychiatric:         Mood and Affect: Mood normal.         Judgment: Judgment normal.        Lab Results   Component Value Date    WBC 4.78 07/20/2023    HGB 10.9 (L) 07/20/2023    HCT 36.2 07/20/2023     (H) 07/20/2023     07/20/2023     Lab Results   Component Value Date    SODIUM 140 07/20/2023    K 4.1 07/20/2023     (H) 07/20/2023    CO2 28 07/20/2023    BUN 17 07/20/2023    CREATININE 0.89 07/20/2023    GLUC 84 07/20/2023    CALCIUM 9.0 07/20/2023     Lab Results   Component Value Date    INR 1.33 (H) 07/01/2023    INR 1.65 (H) 06/30/2023    INR 1.66 (H) 04/29/2023    PROTIME 16.7 (H) 07/01/2023    PROTIME 19.5 (H) 06/30/2023    PROTIME 19.9 (H) 04/29/2023           Current Facility-Administered Medications:   •  acetaminophen (TYLENOL) tablet 650 mg, 650 mg, Oral, Q6H [address], Sampson Gaffney MD, 650 mg at 07/21/23 1251  •  albuterol (PROVENTIL HFA,VENTOLIN HFA) inhaler 2 puff, 2 puff, Inhalation, Q6H PRN, Sampson Gaffney MD, 2 puff at 07/15/23 1603  •  apixaban (ELIQUIS) tablet 5 mg, 5 mg, Oral, BID, Sampson Gaffney MD, 5 mg at 07/21/23 2731  •  buPROPion [facility]) tablet 100 mg, 100 mg, Oral, BID, Sampson Gaffney MD, 100 mg at 07/21/23 0900  •  cinacalcet (SENSIPAR) tablet 30 mg, 30 mg, Oral, Daily With Breakfast, Sampson Gaffney MD, 30 mg at 07/21/23 0900  •  Diclofenac Sodium (VOLTAREN) 1 % topical gel 2 g, 2 g, Topical, 4x Daily, LAURA Rutledge, 2 g at 07/21/23 1252  •  DULoxetine (CYMBALTA) delayed release capsule 60 mg, 60 mg, Oral, BID, Sampson Gaffney MD, 60 mg at 07/21/23 7600  •  gabapentin (NEURONTIN) capsule 400 mg, 400 mg, Oral, BID, Sampson Gaffney MD, 400 mg at 07/21/23 0277  •  hydroxychloroquine (PLAQUENIL) tablet 200 mg, 200 mg, Oral, BID With Meals, Sampson Gaffney MD, 200 mg at 07/21/23 0900  •  lamoTRIgine (LaMICtal) tablet 150 mg, 150 mg, Oral, HS, Sampson Gaffney MD, 150 mg at 07/20/23 2127  •  lidocaine (LIDODERM) 5 % patch 1 patch, 1 patch, Topical, HS, LAURA Rutledge  •  magnesium Oxide (MAG-OX) tablet 400 mg, 400 mg, Oral, BID, Sampson Gaffney MD, 400 mg at 07/21/23 0859  •  melatonin tablet 3 mg, 3 mg, Oral, HS, Sampson Gaffney MD, 3 mg at 07/20/23 2127  •  metoprolol succinate (TOPROL-XL) 24 hr tablet 25 mg, 25 mg, Oral, Daily, LAURA Conley, 25 mg at 07/19/23 1141  •  morphine (MSIR) IR tablet 7.5 mg, 7.5 mg, Oral, BID PRN, LAURA Rutledge, 7.5 mg at 07/21/23 1250  •  ondansetron (ZOFRAN-ODT) dispersible tablet 4 mg, 4 mg, Oral, Q6H PRN, Wihtney Kaplan MD, 4 mg at 07/21/23 0911  •  pantoprazole (PROTONIX) EC tablet 40 mg, 40 mg, Oral, Early Morning, Whitney Kaplan MD, 40 mg at 07/21/23 9056  •  polyethylene glycol (MIRALAX) packet 17 g, 17 g, Oral, Daily PRN, LAURA Rutledge, 17 g at 07/17/23 0934  •  polyethylene glycol (MIRALAX) packet 17 g, 17 g, Oral, Once, LAURA Rutledge  •  simethicone (MYLICON) chewable tablet 80 mg, 80 mg, Oral, 4x Daily (with meals and at bedtime), LAURA Heaton, 80 mg at 07/21/23 1251    Past Medical History:   Diagnosis Date   • Anxiety    • Asthma    • Bipolar depression ([address])    • Chronic narcotic dependence (HCC)    • Chronic pain    • Depression    • Enterovirus heart infection    • SHAR (obstructive sleep apnea)    • Osteoarthritis    • Peripheral neuropathy    • Plantar fasciitis of right foot    • Senile osteoporosis    • Seronegative arthropathy of multiple sites [facility])    • Undifferentiated connective tissue disease ([address])        Patient Active Problem List    Diagnosis Date Noted   • Leg hematoma, left, initial encounter 07/01/2023   • History of stroke 02/28/2023   • CHF (congestive heart failure) ([address])    • Dysphagia 03/09/2023   • Urinary tract infection without hematuria 03/06/2023   • Mixed stress and urge urinary incontinence    • Anemia 06/26/2021   • Cervical dystonia 07/11/2023   • Sundowning 07/09/2023   • Thrush 07/07/2023   • Night terrors 07/07/2023   • Cerebral aneurysm, nonruptured 05/15/2023   • Nocturnal hypoxia 05/01/2023   • Stroke-like symptoms 05/01/2023   • Insomnia 04/03/2023   • Hypomagnesemia 03/30/2023   • Sinus tachycardia 03/29/2023   • Hypokalemia 03/10/2023   • AMS (altered mental status) 03/09/2023   • Gout of foot 03/09/2023   • Wound of left ankle 03/09/2023   • Obstructive sleep apnea 02/28/2023   • History of bacteremia 02/24/2023   • Acute CVA (cerebrovascular accident) ([address]) 02/23/2023   • Hypercalcemia 02/22/2023   • Discoloration of skin of foot 02/22/2023   • Abdominal pain 02/22/2023   • HFrEF (heart failure with reduced ejection fraction) ([address]) 02/13/2023   • Stroke ([address]) 02/12/2023   • HTN (hypertension) 02/12/2023   • Cardiomyopathy ([address]) 02/07/2023   • Vitamin D insufficiency 02/07/2023   • LAILA (acute kidney injury) ([address]) 04/23/2022   • Polypharmacy 04/23/2022   • 'light-for-dates' infant with signs of fetal malnutrition 04/22/2022   • Bipolar depression ([address]) 03/04/2022   • Rheumatoid arthritis of multiple sites with negative rheumatoid factor ([address]) 03/04/2022   • Closed fracture of multiple ribs of right side 03/02/2022   • Traumatic pneumothorax 03/02/2022   • Closed fracture of transverse process of lumbar vertebra ([address]) 03/02/2022   • Liver contusion 03/02/2022   • Fall 03/02/2022   • Acute pain due to trauma 03/02/2022   • Right shoulder pain 03/02/2022   • Severe protein-calorie malnutrition ([address]) 03/02/2022   • Undifferentiated connective tissue disease ([address]) 12/29/2021   • Primary generalized (osteo)arthritis 12/29/2021   • Peripheral neuropathy 12/29/2021   • Osteoporosis 12/29/2021   • Hx of compression fracture of spine 12/29/2021   • Lumbar spondylosis 12/29/2021   • Cervical spondylosis 12/29/2021   • Chronic pain syndrome    • Mild intermittent asthma without complication    • Ambulatory dysfunction    • Seronegative arthropathy of multiple sites ([address])    • Gastroesophageal reflux disease without esophagitis         LAURA Vuong  Physical Medicine and [place]

## 2023-07-22 LAB
ALBUMIN SERPL BCP-MCNC: 2.5 G/DL (ref 3.5–5)
ALP SERPL-CCNC: 55 U/L (ref 46–116)
ALT SERPL W P-5'-P-CCNC: 15 U/L (ref 12–78)
AMMONIA PLAS-SCNC: 22 UMOL/L (ref 11–35)
ANION GAP SERPL CALCULATED.3IONS-SCNC: 2 MMOL/L
AST SERPL W P-5'-P-CCNC: 21 U/L (ref 5–45)
BACTERIA UR CULT: ABNORMAL
BASOPHILS # BLD AUTO: 0.02 THOUSANDS/ÂΜL (ref 0–0.1)
BASOPHILS NFR BLD AUTO: 0 % (ref 0–1)
BILIRUB SERPL-MCNC: 0.5 MG/DL (ref 0.2–1)
BUN SERPL-MCNC: 16 MG/DL (ref 5–25)
CALCIUM ALBUM COR SERPL-MCNC: 9.9 MG/DL (ref 8.3–10.1)
CALCIUM SERPL-MCNC: 8.7 MG/DL (ref 8.3–10.1)
CHLORIDE SERPL-SCNC: 110 MMOL/L (ref 96–108)
CO2 SERPL-SCNC: 30 MMOL/L (ref 21–32)
CREAT SERPL-MCNC: 0.92 MG/DL (ref 0.6–1.3)
EOSINOPHIL # BLD AUTO: 0 THOUSAND/ÂΜL (ref 0–0.61)
EOSINOPHIL NFR BLD AUTO: 0 % (ref 0–6)
ERYTHROCYTE [DISTWIDTH] IN BLOOD BY AUTOMATED COUNT: 20 % (ref 11.6–15.1)
GFR SERPL CREATININE-BSD FRML MDRD: 63 ML/MIN/1.73SQ M
GLUCOSE SERPL-MCNC: 114 MG/DL (ref 65–140)
HCT VFR BLD AUTO: 30.4 % (ref 34.8–46.1)
HGB BLD-MCNC: 9.6 G/DL (ref 11.5–15.4)
IMM GRANULOCYTES # BLD AUTO: 0.03 THOUSAND/UL (ref 0–0.2)
IMM GRANULOCYTES NFR BLD AUTO: 0 % (ref 0–2)
LYMPHOCYTES # BLD AUTO: 0.64 THOUSANDS/ÂΜL (ref 0.6–4.47)
LYMPHOCYTES NFR BLD AUTO: 8 % (ref 14–44)
MCH RBC QN AUTO: 31.5 PG (ref 26.8–34.3)
MCHC RBC AUTO-ENTMCNC: 31.6 G/DL (ref 31.4–37.4)
MCV RBC AUTO: 100 FL (ref 82–98)
MONOCYTES # BLD AUTO: 0.5 THOUSAND/ÂΜL (ref 0.17–1.22)
MONOCYTES NFR BLD AUTO: 6 % (ref 4–12)
NEUTROPHILS # BLD AUTO: 6.57 THOUSANDS/ÂΜL (ref 1.85–7.62)
NEUTS SEG NFR BLD AUTO: 86 % (ref 43–75)
NRBC BLD AUTO-RTO: 0 /100 WBCS
PLATELET # BLD AUTO: 157 THOUSANDS/UL (ref 149–390)
PMV BLD AUTO: 11 FL (ref 8.9–12.7)
POTASSIUM SERPL-SCNC: 4.2 MMOL/L (ref 3.5–5.3)
PROT SERPL-MCNC: 5.7 G/DL (ref 6.4–8.4)
RBC # BLD AUTO: 3.05 MILLION/UL (ref 3.81–5.12)
SODIUM SERPL-SCNC: 142 MMOL/L (ref 135–147)
WBC # BLD AUTO: 7.76 THOUSAND/UL (ref 4.31–10.16)

## 2023-07-22 PROCEDURE — 82140 ASSAY OF AMMONIA: CPT

## 2023-07-22 PROCEDURE — 99232 SBSQ HOSP IP/OBS MODERATE 35: CPT

## 2023-07-22 PROCEDURE — 80053 COMPREHEN METABOLIC PANEL: CPT

## 2023-07-22 PROCEDURE — 97535 SELF CARE MNGMENT TRAINING: CPT

## 2023-07-22 PROCEDURE — 99222 1ST HOSP IP/OBS MODERATE 55: CPT | Performed by: PHYSICIAN ASSISTANT

## 2023-07-22 PROCEDURE — 97530 THERAPEUTIC ACTIVITIES: CPT

## 2023-07-22 PROCEDURE — 99232 SBSQ HOSP IP/OBS MODERATE 35: CPT | Performed by: INTERNAL MEDICINE

## 2023-07-22 PROCEDURE — 85025 COMPLETE CBC W/AUTO DIFF WBC: CPT

## 2023-07-22 RX ORDER — LEVOFLOXACIN 250 MG/1
250 TABLET ORAL EVERY 24 HOURS
Status: DISCONTINUED | OUTPATIENT
Start: 2023-07-22 | End: 2023-07-27

## 2023-07-22 RX ADMIN — MORPHINE SULFATE 7.5 MG: 15 TABLET ORAL at 21:13

## 2023-07-22 RX ADMIN — MORPHINE SULFATE 7.5 MG: 15 TABLET ORAL at 13:40

## 2023-07-22 RX ADMIN — DULOXETINE HYDROCHLORIDE 60 MG: 60 CAPSULE, DELAYED RELEASE ORAL at 09:51

## 2023-07-22 RX ADMIN — SIMETHICONE 80 MG: 80 TABLET, CHEWABLE ORAL at 16:14

## 2023-07-22 RX ADMIN — GABAPENTIN 300 MG: 300 CAPSULE ORAL at 09:52

## 2023-07-22 RX ADMIN — LEVOFLOXACIN 250 MG: 250 TABLET, FILM COATED ORAL at 13:36

## 2023-07-22 RX ADMIN — DICLOFENAC SODIUM 2 G: 10 GEL TOPICAL at 09:54

## 2023-07-22 RX ADMIN — HYDROXYCHLOROQUINE SULFATE 200 MG: 200 TABLET ORAL at 16:14

## 2023-07-22 RX ADMIN — LAMOTRIGINE 150 MG: 100 TABLET ORAL at 21:13

## 2023-07-22 RX ADMIN — ACETAMINOPHEN 650 MG: 325 TABLET, FILM COATED ORAL at 17:25

## 2023-07-22 RX ADMIN — GABAPENTIN 300 MG: 300 CAPSULE ORAL at 17:25

## 2023-07-22 RX ADMIN — DICLOFENAC SODIUM 2 G: 10 GEL TOPICAL at 17:26

## 2023-07-22 RX ADMIN — APIXABAN 5 MG: 5 TABLET, FILM COATED ORAL at 09:52

## 2023-07-22 RX ADMIN — SIMETHICONE 80 MG: 80 TABLET, CHEWABLE ORAL at 13:12

## 2023-07-22 RX ADMIN — HYDROXYCHLOROQUINE SULFATE 200 MG: 200 TABLET ORAL at 09:53

## 2023-07-22 RX ADMIN — LIDOCAINE 1 PATCH: 50 PATCH CUTANEOUS at 21:13

## 2023-07-22 RX ADMIN — ACETAMINOPHEN 650 MG: 325 TABLET, FILM COATED ORAL at 13:13

## 2023-07-22 RX ADMIN — DULOXETINE HYDROCHLORIDE 60 MG: 60 CAPSULE, DELAYED RELEASE ORAL at 17:25

## 2023-07-22 RX ADMIN — BUPROPION HYDROCHLORIDE 100 MG: 100 TABLET, FILM COATED ORAL at 17:25

## 2023-07-22 RX ADMIN — APIXABAN 5 MG: 5 TABLET, FILM COATED ORAL at 17:25

## 2023-07-22 RX ADMIN — BUPROPION HYDROCHLORIDE 100 MG: 100 TABLET, FILM COATED ORAL at 09:54

## 2023-07-22 RX ADMIN — MAGNESIUM OXIDE TAB 400 MG (241.3 MG ELEMENTAL MG) 400 MG: 400 (241.3 MG) TAB at 17:25

## 2023-07-22 RX ADMIN — METOPROLOL SUCCINATE 25 MG: 25 TABLET, FILM COATED, EXTENDED RELEASE ORAL at 09:52

## 2023-07-22 RX ADMIN — MELATONIN TAB 3 MG 3 MG: 3 TAB at 21:13

## 2023-07-22 RX ADMIN — MORPHINE SULFATE 7.5 MG: 15 TABLET ORAL at 04:11

## 2023-07-22 RX ADMIN — MAGNESIUM OXIDE TAB 400 MG (241.3 MG ELEMENTAL MG) 400 MG: 400 (241.3 MG) TAB at 09:52

## 2023-07-22 RX ADMIN — ACETAMINOPHEN 650 MG: 325 TABLET, FILM COATED ORAL at 06:11

## 2023-07-22 RX ADMIN — DICLOFENAC SODIUM 2 G: 10 GEL TOPICAL at 21:20

## 2023-07-22 RX ADMIN — PANTOPRAZOLE SODIUM 40 MG: 40 TABLET, DELAYED RELEASE ORAL at 06:11

## 2023-07-22 RX ADMIN — SIMETHICONE 80 MG: 80 TABLET, CHEWABLE ORAL at 21:13

## 2023-07-22 RX ADMIN — CINACALCET 30 MG: 30 TABLET, FILM COATED ORAL at 09:53

## 2023-07-22 NOTE — RESTORATIVE TECHNICIAN NOTE
Restorative Technician Note      Patient Name: Reema Daley     Restorative Tech Visit Date: 07/22/23  Note Type: Bracing, Initial consult  Patient Position Upon Consult: Supine  Brace Applied: Aspen Horizon 456 Back Pack TLSO  Additional Brace Ordered: No  Patient Position When Brace Applied: Seated (therapy present and assisted pt to the EOB)  Education Provided: Yes (TLSO backpack brace handout given to the patient)  Patient Position at End of Consult: Seated edge of bed; Other (comment) (with  therapy present)    Please call Mobility Coordinator at ext. [phone] or on [place] text " [facility]-PT-Restorative Tech" role in regards to bracing instruction and/or adjustment.     [employer],

## 2023-07-22 NOTE — NUTRITION
07/22/23 1327   Recommendations/Interventions   Summary Calorie count results Day 2 from 7/21/23 included only 1 meal recorded and 50% of 1 Magic Cup at breakfast with 500 kcal and 34 gms Pro meeting 31% kcal and 48% Pro  needs but only from the 1 meal. Patient stated she is trying to increase po intake and is being offered additional menu options that can be pureed in house such as macaroni and cheese.

## 2023-07-22 NOTE — PLAN OF CARE
Problem: PAIN - ADULT  Goal: Verbalizes/displays adequate comfort level or baseline comfort level  Description: Interventions:  - Encourage patient to monitor pain and request assistance  - Assess pain using appropriate pain scale  - Administer analgesics based on type and severity of pain and evaluate response  - Implement non-pharmacological measures as appropriate and evaluate response  - Consider cultural and social influences on pain and pain management  - Notify physician/advanced practitioner if interventions unsuccessful or patient reports new pain  Outcome: Progressing     Problem: INFECTION - ADULT  Goal: Absence or prevention of progression during hospitalization  Description: INTERVENTIONS:  - Assess and monitor for signs and symptoms of infection  - Monitor lab/diagnostic results  - Monitor all insertion sites, i.e. indwelling lines, tubes, and drains  - Monitor endotracheal if appropriate and nasal secretions for changes in amount and color  - Paton appropriate cooling/warming therapies per order  - Administer medications as ordered  - Instruct and encourage patient and family to use good hand hygiene technique  - Identify and instruct in appropriate isolation precautions for identified infection/condition  Outcome: Progressing  Goal: Absence of fever/infection during neutropenic period  Description: INTERVENTIONS:  - Monitor WBC    Outcome: Progressing     Problem: SAFETY ADULT  Goal: Patient will remain free of falls  Description: INTERVENTIONS:  - Educate patient/family on patient safety including physical limitations  - Instruct patient to call for assistance with activity   - Consult OT/PT to assist with strengthening/mobility   - Keep Call bell within reach  - Keep bed low and locked with side rails adjusted as appropriate  - Keep care items and personal belongings within reach  - Initiate and maintain comfort rounds  - Make Fall Risk Sign visible to staff  - Offer Toileting every  Hours, in advance of need  - Initiate/Mm  - Obtain necessary fall risk management equ  - Apply yellow socks and bracelet for high fall risk patients  - Consider moving patient to room near nurses station  Outcome: Progressing  Goal: Maintain or return to baseline ADL function  Description: INTERVENTIONS:  -  Assess patient's ability to carry out ADLs; assess patient's baseline for ADL function and identify physical deficits which impact ability to perform ADLs (bathing, care of mouth/teeth, toileting, grooming, dressing, etc.)  - Assess/evaluate cause of self-care deficits   - Assess range of motion  - Assess patient's mobility; develop plan if impaired  - Assess patient's need for assistive devices and provide as appropriate  - Encourage maximum independence but intervene and supervise when necessary  - Involve family in performance of ADLs  - Assess for home care needs following discharge   - Consider OT consult to assist with ADL evaluation and planning for discharge  - Provide patient education as appropriate  Outcome: Progressing  Goal: Maintains/Returns to pre admission functional level  Description: INTERVENTIONS:  - Perform BMAT or MOVE assessment daily.   - Set and communicate daily mobility goal to care team and patient/family/caregiver. - Collaborate with rehabilitation services on mobility goals if consulted  - Perform Range o a day.   - Reposition patient .  - Dangle p  - Stand patient   - Ambulate pat  - Out of bed to ch  - Out of bed for   - Out of bed for toileting  - Record patient progress and toleration of activity level   Outcome: Progressing     Problem: DISCHARGE PLANNING  Goal: Discharge to home or other facility with appropriate resources  Description: INTERVENTIONS:  - Identify barriers to discharge w/patient and caregiver  - Arrange for needed discharge resources and transportation as appropriate  - Identify discharge learning needs (meds, wound care, etc.)  - Arrange for interpretive services to assist at discharge as needed  - Refer to Case Management Department for coordinating discharge planning if the patient needs post-hospital services based on physician/advanced practitioner order or complex needs related to functional status, cognitive ability, or social support system  Outcome: Progressing     Problem: Prexisting or High Potential for Compromised Skin Integrity  Goal: Skin integrity is maintained or improved  Description: INTERVENTIONS:  - Identify patients at risk for skin breakdown  - Assess and monitor skin integrity  - Assess and monitor nutrition and hydration status  - Monitor labs   - Assess for incontinence   - Turn and reposition patient  - Assist with mobility/ambulation  - Relieve pressure over bony prominences  - Avoid friction and shearing  - Provide appropriate hygiene as needed including keeping skin clean and dry  - Evaluate need for skin moisturizer/barrier cream  - Collaborate with interdisciplinary team   - Patient/family teaching  - Consider wound care consult   Outcome: Progressing     Problem: Nutrition/Hydration-ADULT  Goal: Nutrient/Hydration intake appropriate for improving, restoring or maintaining nutritional needs  Description: Monitor and assess patient's nutrition/hydration status for malnutrition. Collaborate with interdisciplinary team and initiate plan and interventions as ordered. Monitor patient's weight and dietary intake as ordered or per policy. Utilize nutrition screening tool and intervene as necessary. Determine patient's food preferences and provide high-protein, high-caloric foods as appropriate.      INTERVENTIONS:  - Monitor oral intake, urinary output, labs, and treatment plans  - Assess nutrition and hydration status and recommend course of action  - Evaluate amount of meals eaten  - Assist patient with eating if necessary   - Allow adequate time for meals  - Recommend/ encourage appropriate diets, oral nutritional supplements, and vitamin/mineral supplements  - Order, calculate, and assess calorie counts as needed  - Recommend, monitor, and adjust tube feedings and TPN/PPN based on assessed needs  - Assess need for intravenous fluids  - Provide specific nutrition/hydration education as appropriate  - Include patient/family/caregiver in decisions related to nutrition  Outcome: Progressing     Problem: MOBILITY - ADULT  Goal: Maintain or return to baseline ADL function  Description: INTERVENTIONS:  -  Assess patient's ability to carry out ADLs; assess patient's baseline for ADL function and identify physical deficits which impact ability to perform ADLs (bathing, care of mouth/teeth, toileting, grooming, dressing, etc.)  - Assess/evaluate cause of self-care deficits   - Assess range of motion  - Assess patient's mobility; develop plan if impaired  - Assess patient's need for assistive devices and provide as appropriate  - Encourage maximum independence but intervene and supervise when necessary  - Involve family in performance of ADLs  - Assess for home care needs following discharge   - Consider OT consult to assist with ADL evaluation and planning for discharge  - Provide patient education as appropriate  Outcome: Progressing  Goal: Maintains/Returns to pre admission functional level  Description: INTERVENTIONS:  - Perform BMAT or MOVE assessment daily.   - Set and communicate daily mobility goal to care team and patient/family/caregiver. - Collaborate with rehabilitation services on mobility goals if consulted  - Perform Range es a day. - Reposition patienurs.   - Dangle patient  - Stand patien  - Ambulate patien  - Out of bed to ch  - Out of bed for   - Out of bed for toileting  - Record patient progress and toleration of activity level   Outcome: Progressing     Problem: Nutrition/Hydration-ADULT  Goal: Nutrient/Hydration intake appropriate for improving, restoring or maintaining nutritional needs  Description: Monitor and assess patient's nutrition/hydration status for malnutrition. Collaborate with interdisciplinary team and initiate plan and interventions as ordered. Monitor patient's weight and dietary intake as ordered or per policy. Utilize nutrition screening tool and intervene as necessary. Determine patient's food preferences and provide high-protein, high-caloric foods as appropriate.      INTERVENTIONS:  - Monitor oral intake, urinary output, labs, and treatment plans  - Assess nutrition and hydration status and recommend course of action  - Evaluate amount of meals eaten  - Assist patient with eating if necessary   - Allow adequate time for meals  - Recommend/ encourage appropriate diets, oral nutritional supplements, and vitamin/mineral supplements  - Order, calculate, and assess calorie counts as needed  - Recommend, monitor, and adjust tube feedings and TPN/PPN based on assessed needs  - Assess need for intravenous fluids  - Provide specific nutrition/hydration education as appropriate  - Include patient/family/caregiver in decisions related to nutrition  Outcome: Progressing

## 2023-07-22 NOTE — CONSULTS
Consultation - Neurosurgery   Tommy Mccartney 70 y.o. female MRN: [id number]  Unit/Bed#: -01 Encounter: [id number]    Images reviewed at morning rounds on 7/22/2023 at 7 AM  Patient was seen and examined on 1/23/2023 at 7:10 AM    Inpatient consult to Neurosurgery  Consult performed by: Rena Yang PA-C  Consult ordered by: Nolen Sandifer, MD          Assessment/Plan               Assessment:  1. Age indeterminate Mild anterior wedging [id number] 1  2. Chronic T9 and T11 compression deformity  3. S/Post L1 kyphoplasty      Plan:   · Exam: Patient  Supine in bed, probed up, She is  alert and oriented x3. Ariana, strength 5/5 and sensation to LT intact bilaterally. DTr 2+, no clonus, Tenderness in the TL region on Palpation  · Imaging is reviewed personally and findings as follows:  · XR thoracic spine demonstrates age-indeterminate mild anterior wedging of T12, chronic T9 and T11 compression for. Post kyphoplasty of L1, stenosis and angulation of her thoracolumbar spine  · Pain control: Went multimodal pain medication, lidocaine patch/ice pack application  · DVT ppx: SCDs bilateral legs, okay with Pharm DVT PPx  · Activity: Tolerated  · PT/OT evaluation & treatment  · Brace: Wear TLSO brace when upright and at 44 presented after fall degree head up  · Medical Mx: Primary team  · Neurocheck: Routine  · Mild age-indeterminate T12 compression, multiple  Chronic TL compression fracture. Hx of fall. TL pain, and tenderness noted, otherwise neurologically non focal. Upright Thoracic XR stable mild anterior compression-age indeterminate. From NSx perspective no procedure is anticipated, continue conservative treatment with bracing, PT/OT and pain control. Follow-up in 2 weeks with upright thoracolumbar spine x-rays in brace. S/O. Call with question or concern.       History of Present Illness     HPI: Tommy Mccartney is a 70 y.o. female with PMHx of stroke status post  R MCA thrombectomy ( 2/12/2023, [facility]) on  Eliquis, asthma, bipolar depression, chronic narcotic dependence, depression, SHAR, osteoporosis, arthritis, peripheral neuropathy, presented after fall two days ago. Complains of moderate to severe thoracolumbar back pain. Patient denies any radicular pain to the LE, or weakness. Pain worse with movement. Baseline gait instability, uses walker for ambulation. she denies any bowel or bladder dysfunction. No loss of consciousness. Denies history of fever, chills, rigors, cough or chest pain. Patient denies Hx orf DM,   hypertension, congestive heart failure, seizures, bleeding disorder. She takes Eliquiis. Review of Systems   Constitutional: Negative for activity change, chills, fatigue and fever. HENT: Negative for trouble swallowing and voice change. Eyes: Negative for photophobia and visual disturbance. Respiratory: Negative for cough, shortness of breath and wheezing. Cardiovascular: Negative for chest pain and palpitations. Gastrointestinal: Negative for nausea and vomiting. Endocrine: Negative for polyuria. Genitourinary: Negative for difficulty urinating. Musculoskeletal: Positive for back pain and gait problem. Neurological: Negative for dizziness, tremors, seizures, syncope, facial asymmetry, speech difficulty, weakness, light-headedness, numbness and headaches. Psychiatric/Behavioral: Negative for confusion.        Historical Information   Past Medical History:   Diagnosis Date   • Anxiety    • Asthma    • Bipolar depression ([address])    • Chronic narcotic dependence ([address])    • Chronic pain    • Depression    • Enterovirus heart infection    • SHAR (obstructive sleep apnea)    • Osteoarthritis    • Peripheral neuropathy    • Plantar fasciitis of right foot    • Senile osteoporosis    • Seronegative arthropathy of multiple sites [facility])    • Undifferentiated connective tissue disease ([address])      Past Surgical History:   Procedure Laterality Date   • BREAST IMPLANT     • FIXATION KYPHOPLASTY LUMBAR SPINE     • HYSTERECTOMY     • INCONTINENCE SURGERY N/A    • IR STROKE ALERT  2/12/2023   • NASAL SEPTOPLASTY W/ TURBINOPLASTY N/A    • RECTAL PROLAPSE REPAIR N/A    • REPAIR RECTOCELE     • RHINOPLASTY N/A      Social History     Substance and Sexual Activity   Alcohol Use Not Currently    Comment: quit     Social History     Substance and Sexual Activity   Drug Use Not Currently   • Types: Marijuana    Comment: has medical card     Social History     Tobacco Use   Smoking Status Former   Smokeless Tobacco Never     Family History   Problem Relation Age of Onset   • No Known Problems Mother    • No Known Problems Father    • Arthritis Family    • Diabetes Family    • Stroke Family        Meds/Allergies   all current active meds have been reviewed, current meds:   Current Facility-Administered Medications   Medication Dose Route Frequency   • acetaminophen (TYLENOL) tablet 650 mg  650 mg Oral Q6H [address]   • albuterol (PROVENTIL HFA,VENTOLIN HFA) inhaler 2 puff  2 puff Inhalation Q6H PRN   • apixaban (ELIQUIS) tablet 5 mg  5 mg Oral BID   • buPROPion (WELLBUTRIN) tablet 100 mg  100 mg Oral BID   • cinacalcet (SENSIPAR) tablet 30 mg  30 mg Oral Daily With Breakfast   • Diclofenac Sodium (VOLTAREN) 1 % topical gel 2 g  2 g Topical 4x Daily   • DULoxetine (CYMBALTA) delayed release capsule 60 mg  60 mg Oral BID   • gabapentin (NEURONTIN) capsule 300 mg  300 mg Oral BID   • hydroxychloroquine (PLAQUENIL) tablet 200 mg  200 mg Oral BID With Meals   • lamoTRIgine (LaMICtal) tablet 150 mg  150 mg Oral HS   • lidocaine (LIDODERM) 5 % patch 1 patch  1 patch Topical HS   • magnesium Oxide (MAG-OX) tablet 400 mg  400 mg Oral BID   • melatonin tablet 3 mg  3 mg Oral HS   • metoprolol succinate (TOPROL-XL) 24 hr tablet 25 mg  25 mg Oral Daily   • morphine (MSIR) IR tablet 7.5 mg  7.5 mg Oral TID PRN   • ondansetron (ZOFRAN-ODT) dispersible tablet 4 mg  4 mg Oral Q6H PRN   • pantoprazole (PROTONIX) EC tablet 40 mg  40 mg Oral Early Morning   • polyethylene glycol (MIRALAX) packet 17 g  17 g Oral Daily PRN   • polyethylene glycol (MIRALAX) packet 17 g  17 g Oral Once   • saliva substitute (MOUTH KOTE) mucosal solution 5 spray  5 spray Mouth/Throat 4x Daily PRN   • simethicone (MYLICON) chewable tablet 80 mg  80 mg Oral 4x Daily (with meals and at bedtime)    and PTA meds:   Prior to Admission Medications   Prescriptions Last Dose Informant Patient Reported? Taking?    DULoxetine (CYMBALTA) 60 mg delayed release capsule 7/7/2023  Yes Yes   Sig: Take 60 mg by mouth 2 (two) times a day   LORazepam (ATIVAN) 0.5 mg tablet Unknown Self No No   Sig: Take 0.5 tablets (0.25 mg total) by mouth 2 (two) times a day as needed for anxiety   acetaminophen (TYLENOL) 325 mg tablet 7/7/2023  No Yes   Sig: Take 2 tablets (650 mg total) by mouth every 6 (six) hours   albuterol (PROVENTIL HFA,VENTOLIN HFA) 90 mcg/act inhaler Unknown Self No No   Sig: Inhale 2 puffs every 6 (six) hours as needed for wheezing   apixaban (ELIQUIS) 5 mg 7/7/2023 Self No Yes   Sig: Take 1 tablet (5 mg total) by mouth 2 (two) times a day   buPROPion (WELLBUTRIN) 100 mg tablet 7/7/2023 Self Yes Yes   Sig: Take 100 mg by mouth 2 (two) times a day   cinacalcet (SENSIPAR) 30 mg tablet 7/7/2023 Self No Yes   Sig: Take 1 tablet (30 mg total) by mouth daily with breakfast   gabapentin (NEURONTIN) 400 mg capsule 7/7/2023  Yes Yes   Sig: Take 400 mg by mouth 2 (two) times a day   hydroxychloroquine (PLAQUENIL) 200 mg tablet 7/7/2023  No Yes   Sig: Take 1 tablet (200 mg total) by mouth 2 (two) times a day with meals   lamoTRIgine (LaMICtal) 150 MG tablet 7/6/2023 Self Yes Yes   Sig: Take 1 tablet by mouth daily at bedtime pt taking 150mg daily at bedtime    magnesium oxide (MAG-OX) 400 mg tablet 7/7/2023 Self No Yes   Sig: Take 2 tablets (800 mg total) by mouth 2 (two) times a day   melatonin 3 mg 7/6/2023 Self No Yes   Sig: Take 1 tablet (3 mg total) by mouth daily at bedtime   metoprolol succinate (TOPROL-XL) 25 mg 24 hr tablet 2023  No Yes   Sig: Take 1 tablet (25 mg total) by mouth daily   oxyCODONE (ROXICODONE) 5 immediate release tablet Unknown  No No   Si.5 mg to 5 mg PO every 4 hours as needed for moderate to severe pain. Ongoing therapy. pantoprazole (PROTONIX) 40 mg tablet 2023 Self No Yes   Sig: Take 1 tablet (40 mg total) by mouth daily in the early morning Do not start before 2023. prazosin (MINIPRESS) 1 mg capsule 2023 Self No Yes   Sig: Take 1 capsule (1 mg total) by mouth daily at bedtime   senna (SENOKOT) 8.6 mg 2023  No Yes   Sig: Take 1 tablet (8.6 mg total) by mouth daily at bedtime   spironolactone (ALDACTONE) 25 mg tablet 2023  No Yes   Sig: Take 1 tablet (25 mg total) by mouth daily      Facility-Administered Medications: None     Allergies   Allergen Reactions   • Cephalosporins Other (See Comments)     rash   • Iodine - Food Allergy Anaphylaxis     Anaphylaxis     • Methocarbamol Hives   • Sulfa Antibiotics Hives   • Bactrim [Sulfamethoxazole-Trimethoprim] Hives   • Erythromycin Hives   • Penicillins    • Shrimp Extract Allergy Skin Test - Food Allergy Other (See Comments)   • Trimethoprim Hives       Objective   I/O        0701   0700  0701   0700    P. O. 160 601    Total Intake(mL/kg) 160 (3.1) 601 (11.6)    Urine (mL/kg/hr)  500 (0.4)    Total Output  500    Net +160 +101          Unmeasured Urine Occurrence 7 x 3 x          Physical Exam  Constitutional:       Appearance: Normal appearance. HENT:      Head: Normocephalic and atraumatic. Eyes:      Extraocular Movements: Extraocular movements intact. Cardiovascular:      Rate and Rhythm: Normal rate. Pulses: Normal pulses. Pulmonary:      Effort: Pulmonary effort is normal.   Musculoskeletal:         General: Tenderness present. Cervical back: Normal range of motion. Neurological:      General: No focal deficit present. Mental Status: She is alert and oriented to person, place, and time. GCS: GCS eye subscore is 4. GCS verbal subscore is 5. GCS motor subscore is 6. Cranial Nerves: Cranial nerves 2-12 are intact. Sensory: Sensation is intact. Motor: Motor function is intact. Deep Tendon Reflexes: Reflexes are normal and symmetric. Reflex Scores:       Tricep reflexes are 2+ on the right side and 2+ on the left side. Bicep reflexes are 2+ on the right side and 2+ on the left side. Brachioradialis reflexes are 2+ on the right side and 2+ on the left side. Patellar reflexes are 2+ on the right side and 2+ on the left side. Achilles reflexes are 2+ on the right side and 2+ on the left side. Psychiatric:         Speech: Speech normal.       Neurologic Exam     Mental Status   Oriented to person, place, and time. Speech: speech is normal   Level of consciousness: alert    Cranial Nerves   Cranial nerves II through XII intact. CN III, IV, VI   Nystagmus: none     CN XI   CN XI normal.     Motor Exam   Muscle bulk: normal  Overall muscle tone: normal  Right arm tone: normal  Left arm tone: normal  Right arm pronator drift: absent  Left arm pronator drift: absent  Right leg tone: normal  Left leg tone: normal    Sensory Exam   Light touch normal.     Gait, Coordination, and Reflexes     Reflexes   Right brachioradialis: 2+  Left brachioradialis: 2+  Right biceps: 2+  Left biceps: 2+  Right triceps: 2+  Left triceps: 2+  Right patellar: 2+  Left patellar: 2+  Right achilles: 2+  Left achilles: 2+  Right : 2+  Left : 2+  Right Ramos: absent  Left Ramos: absent  Right ankle clonus: absent  Left pendular knee jerk: absent      Vitals:Blood pressure 103/65, pulse 100, temperature 98.7 °F (37.1 °C), temperature source Axillary, resp. rate 22, height 5' 3" (1.6 m), weight 51.9 kg (114 lb 6.7 oz), SpO2 92 %. ,Body mass index is 20.27 kg/m².      Lab Results:   Results from last 7 days   Lab Units 07/20/23  0643 07/18/23  0516   WBC Thousand/uL 4.78 5.07   HEMOGLOBIN g/dL 10.9* 9.9*   HEMATOCRIT % 36.2 30.9*   PLATELETS Thousands/uL 197 220   NEUTROS PCT % 68 65   MONOS PCT % 8 9   EOS PCT % 0 0     Results from last 7 days   Lab Units 07/20/23  0643 07/18/23  0516   POTASSIUM mmol/L 4.1 4.3   CHLORIDE mmol/L 111* 109*   CO2 mmol/L 28 30   BUN mg/dL 17 18   CREATININE mg/dL 0.89 0.84   CALCIUM mg/dL 9.0 8.9                 No results found for: "TROPONINT"  ABG:  Lab Results   Component Value Date    PHART 7.441 05/01/2023    ETP0PMD 37.5 05/01/2023    PO2ART 92.0 05/01/2023    QFD7HWU 25.0 05/01/2023    BEART 1.0 05/01/2023    SOURCE Radial, Left 05/01/2023       Imaging Studies: I have personally reviewed pertinent reports.  , I have personally reviewed pertinent films in PACS and I have personally reviewed pertinent films in PACS with a Radiologist.    EKG, Pathology, and Other Studies: I have personally reviewed pertinent reports.  , I have personally reviewed pertinent films in PACS and I have personally reviewed pertinent films in PACS with a Radiologist.    VTE Prophylaxis: Sequential compression device (Venodyne)     Code Status: Level 1 - Full Code  Advance Directive and Living Will:      Power of :    POLST:      Counseling / Coordination of Care  I spent 20 minutes with the patient.

## 2023-07-22 NOTE — PROGRESS NOTES
Internal Medicine Progress Note  Patient: Belinda Rudd  Age/sex: 70 y.o. female  Medical Record #: [id number]      ASSESSMENT/PLAN: (Interval History)  Belinda Rudd is seen and examined and management for following issues:    Left lateral thigh hematoma   • Stable     ABLA  • S/p 1 unit PRBCs 7/2/23  • CBC 7/18/23 stable     Fall  • While getting OOB 7/19  • Did not hit head but unwitnessed  • CTH/CT cerv spine with no acute changes  • Xrays with new compression fracture     Thrush  • S/p Nystatin     Chronic combined systolic/diastolic heart failure   • Originally diagnosed 2004 when LVEF then was 10-15% and felt to be NICM  • Her LVEF did recover into 50's then fell when she had her CVA in 2/2023 to 30%  • Also has moderate AI/gr 2 DD  • Sees Dr Matthias Gonzalez a [facility] in 2/2023 when LVEF was found to be 30% which is most current  • Had been on Coreg in past but HF team switched her to Toprol when she had her stroke 2/2023  • Her soft BPs have limited starting her on ACEI/ARB as OP  • Home:  Toprol XL 25mg BID/Aldactone 25mg qd  • Here:  Toprol XL 25 mg QD = held 7/20/23 and 7/21/23  • D/C'd Aldactone 7/16  • euvolemic     Hypercalcemia  • Continue Cinacalcet here as was taking at home  • Calcium 8.9 on 7/18/23     HS hypoxia   • using 4L NC HS at home  • using O2 at night here     Nightmares  • placed on Minipress when here with us after her CVA  • D/w Dr Sean Bridges, d/c'd since had been too low to get  • Minipress dc'd 2/2 hypotension     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  • Has residual left sided weakness  • Had recrudescence of CVA 4/2023 with worsening left facial droop  • Continue Eliquis     Dysphagia  • Had admission for aspiration PNA at [facility] 5/2023 tx'd with Levaquin and Flagyl  • Continue pureed diet with NTL as at home  • ST following     Weight loss  • Continue Ensure HP pudding TID and Magic cups TID  • She was on no supplements at home so likely will gain some wt that she is eating/drinking supps here  • adamantly refuses PEG tube  • Nutrition following     Bipolar depression  • Home:  Wellbutrin 100mg BID/Cymbalta 60mg BID/Lamictal 150mg qhs  • Here:  Wellbutrin 100mg BID/Cymbalta 60mg BID/Lamictal 150mg qhs     Rheumatoid arthritis  • Continue Plaquenil 200mg BID as at home     Chronic pain/chronic opioid use  • 2/2 RA and lumbar spondylosis  • Pain management per PMR     Hx incidental right superior hypophyseal artery aneurysm  • Follows with MAXIMO Howard Pore     [facility] urine 3/2023  • Got Cipro then to [facility] for tx at the time  • Maintain contact isolation     Dye allergy   • Was dye prepped on 7/4/23 with Benadryl 50mg IV x 1 w/o issues     Incidental findings  • Pancolitis with increasing inflammatory free fluid in pelvis  • Calcified granuloma RLL     Hypokalemia  • Got Kdur 40 meq x 2 on 7/8/23  • Stable at 4.1     Enterococcus UTI  • S/p Julieta Ransom Dr Depadua = sensitive  • UA for c/o dysuria again 7/20 = culture again with >100,000 Enterococcus  • Pt has multiple abx allergies. Start Levaquin. Consider ID input.      Discharge date:  TBD     The above assessment and plan was reviewed and updated as determined by my evaluation of the patient on 7/22/2023.     Labs:   Results from last 7 days   Lab Units 07/22/23  0900 07/20/23  0643   WBC Thousand/uL 7.76 4.78   HEMOGLOBIN g/dL 9.6* 10.9*   HEMATOCRIT % 30.4* 36.2   PLATELETS Thousands/uL 157 197     Results from last 7 days   Lab Units 07/22/23  0916 07/20/23  0643   SODIUM mmol/L 142 140   POTASSIUM mmol/L 4.2 4.1   CHLORIDE mmol/L 110* 111*   CO2 mmol/L 30 28   BUN mg/dL 16 17   CREATININE mg/dL 0.92 0.89   CALCIUM mg/dL 8.7 9.0                   Review of Scheduled Meds:  Current Facility-Administered Medications   Medication Dose Route Frequency Provider Last Rate   • acetaminophen  650 mg Oral Q6H [facility] Thao Coles MD     • albuterol  2 puff Inhalation Q6H PRN Thao Coles MD     • apixaban  5 mg Oral BID Lore Boyce Mo Craft MD     • buPROPion  100 mg Oral BID Chintan Melchor MD     • cinacalcet  30 mg Oral Daily With Breakfast Chintan Melchor MD     • Diclofenac Sodium  2 g Topical 4x Daily LAURA Rutledge     • DULoxetine  60 mg Oral BID Chintan Melchor MD     • gabapentin  300 mg Oral BID Roslyn Ward MD     • hydroxychloroquine  200 mg Oral BID With Meals Chintan Melchor MD     • lamoTRIgine  150 mg Oral HS Chintan Melchor MD     • lidocaine  1 patch Topical HS LAURA Rutledge     • magnesium Oxide  400 mg Oral BID Chintan Melchor MD     • melatonin  3 mg Oral HS Chintan Melchor MD     • metoprolol succinate  25 mg Oral Daily LAURA Thurston     • morphine  7.5 mg Oral TID PRN Roslyn Ward MD     • ondansetron  4 mg Oral Q6H PRN Chintan Melchor MD     • pantoprazole  40 mg Oral Early Morning Chintan Melchor MD     • polyethylene glycol  17 g Oral Daily PRN LAURA Rutledge     • polyethylene glycol  17 g Oral Once LAURA Dang     • saliva substitute  5 spray Mouth/Throat 4x Daily PRN LAURA Diamond     • simethicone  80 mg Oral 4x Daily (with meals and at bedtime) LAURA Garcia         Subjective/ HPI: Patient seen and examined. Patients overnight issues or events were reviewed with nursing or staff during rounds or morning huddle session. New or overnight issues include the following:     Pt seen in her room. She reports continued back pain. She denies any other complaints. ROS:   A 10 point ROS was performed; negative except as noted above.      *Labs /Radiology studies reviewed  *Medications reviewed and reconciled as needed  *Please refer to order section for additional ordered labs studies  *Case discussed with primary attending during morning huddle case rounds    Physical Examination:  Vitals:   Vitals:    07/21/23 2214 07/22/23 0416 07/22/23 0700 07/22/23 0950   BP: 102/62 103/65  106/60   BP Location: Right arm   Right arm   Pulse: 95 100  (!) 112 Resp: 18 22     Temp: 97.7 °F (36.5 °C) 98.7 °F (37.1 °C)     TempSrc: Axillary Axillary     SpO2: 92% 92%     Weight:   51.3 kg (113 lb)    Height:           General Appearance: no distress, conversive, frail appearing  HEENT: PERRLA, conjuctiva normal; oropharynx clear; mucous membranes moist   Neck:  Supple, normal ROM  Lungs: CTA, normal respiratory effort, no retractions, expiratory effort normal  CV: regular rate and rhythm; no rubs/murmurs/gallops, PMI normal   ABD: soft; ND/NT; +BS  EXT: no edema  Skin: normal turgor, normal texture, no rashes  Psych: affect normal, mood normal  Neuro: AAO      The above physical exam was reviewed and updated as determined by my evaluation of the patient on 7/22/2023. Invasive Devices     None                    VTE Pharmacologic Prophylaxis: Eliquis  Code Status: Level 1 - Full Code  Current Length of Stay: 15 day(s)      Total time spent:  30 minutes with more than 50% spent counseling/coordinating care. Counseling includes discussion with patient re: progress  and discussion with patient of his/her current medical state/information. Coordination of patient's care was performed in conjunction with primary service. Time invested included review of patient's labs, vitals, and management of their comorbidities with continued monitoring. In addition, this patient was discussed with medical team including physician and advanced extenders. The care of the patient was extensively discussed and appropriate treatment plan was formulated unique for this patient. Medical decision making for the day was made by supervising physician unless otherwise noted in their attestation statement. ** Please Note:  voice to text software may have been used in the creation of this document.  Although proof errors in transcription or interpretation are a potential of such software**

## 2023-07-22 NOTE — PROGRESS NOTES
07/22/23 1030   Pain Assessment   Pain Assessment Tool 0-10   Pain Score 5   Pain Location/Orientation Orientation: Lower; Location: Back   Hospital Pain Intervention(s) Rest;Emotional support   Restrictions/Precautions   Precautions Aspiration;Bed/chair alarms; Fall Risk;Cognitive;1:1;Impulsive;Pain;Supervision on toilet/commode  (spinal precautions)   Weight Bearing Restrictions No   ROM Restrictions No   Braces or Orthoses TLSO;Other (Comment)  (backpack TLSO fitted today during OT session)   Lifestyle   Autonomy "what are my back precautions"   Upper Body Dressing   Type of Assistance Needed Physical assistance   Physical Assistance Level 51%-75%   Comment Mod A seated supported in bed with HOB at 30*. Log rolling to pull down shirt over back with assistance needed. Awaiting TLSO brace fitting with ADL dressing completed in bed with emphasis on log roll technique. Upper Body Dressing CARE Score 2   Lower Body Dressing   Type of Assistance Needed Physical assistance   Physical Assistance Level 51%-75%   Comment Mod A standing with RW with pt pulling pants up on L with assistance needed on R with verbal cues for safety with unilateral support in stance. In addition TLSO brace new with assistance needed for managing pants with TLSO brace.    Lower Body Dressing CARE Score 2   Roll Left and Right   Type of Assistance Needed Physical assistance   Physical Assistance Level 25% or less   Comment Min A with log roll technique reviewed and completed   Roll Left and Right CARE Score 3   Lying to Sitting on Side of Bed   Type of Assistance Needed Physical assistance   Physical Assistance Level 51%-75%   Comment Mod A with verbal cues for log roll technique   Lying to Sitting on Side of Bed CARE Score 2   Sit to Stand   Type of Assistance Needed Physical assistance   Physical Assistance Level 25% or less   Comment Min A with RW   Sit to Stand CARE Score 3   Bed-Chair Transfer   Type of Assistance Needed Physical assistance   Physical Assistance Level 25% or less   Comment Min A with RW   Chair/Bed-to-Chair Transfer CARE Score 3   Cognition   Overall Cognitive Status Impaired   Arousal/Participation Alert; Cooperative   Attention Attends with cues to redirect   Orientation Level Oriented X4  (Simultaneous filing. User may not have seen previous data.)   Memory Decreased short term memory;Decreased recall of recent events;Decreased recall of precautions   Following Commands Follows one step commands with increased time or repetition   Assessment   Treatment Assessment Pt participated in 60 min OT session with fair activity tolerance with focus on ADL training and therapeutic activities. Pt seated supported in bed with HOB at 30* upon therapist entering room with 1:1 present. Pt agreeable to OT session. Pt has new orders for TLSO brace with [employer] to fit pt at start of OT session. Pt educated on spinal precautions with pt requiring min verbal cues to recall throughout OT session. Pt donned shirt seated supported in bed with HOB at 30*. Pt educated on log rolling and importance for spinal precautions with pt able to complete with Min A. Pt fitted for TLSO following donning shirt with backpack TLSO given to pt. Pt then participated in supine to sit EOB with Min A and log roll technique. Pt participated in sit to stand for donning pants at [address] A level with Mod A needed for donning pants over hips secondary to new TLSO brace with education and technique reviewed. Pt participated in therapeutic activity of standing balance/tolerance activity, standing for 2-3 minutes at a time with fair standing balance with focus on hand placement/safety/technique. Pt able to carryover after 1st attempt with good ability to recall safety/hand placement throughout. Pt seated in w/c at end of therapy session with all needs within reach, chair alarm on, and 1:1 present.  Pt limited by strength, spinal precautions recall/techniques, new TLSO requiring further education for IND with donning/doffing, LUE useage, and L visual attention. Pt would benefit from continued OT services to progress pt with ADL training, functional transfers, TLSO training, activity engagement, and safety training for safe d/c. Continue with established POC at this time. Prognosis Fair   Problem List Decreased strength;Decreased endurance; Impaired balance;Decreased mobility; Decreased coordination;Decreased safety awareness; Impaired vision   Barriers to Discharge Inaccessible home environment;Decreased caregiver support   Plan   Treatment/Interventions ADL retraining;Functional transfer training;LE strengthening/ROM; Therapeutic exercise; Endurance training;Patient/family training;Equipment eval/education; Bed mobility; Compensatory technique education   OT Therapy Minutes   OT Time In 1030   OT Time Out 1130   OT Total Time (minutes) 60   OT Mode of treatment - Individual (minutes) 60   OT Mode of treatment - Concurrent (minutes) 0   OT Mode of treatment - Group (minutes) 0   OT Mode of treatment - Co-treat (minutes) 0   OT Mode of Treatment - Total time(minutes) 60 minutes   OT Cumulative Minutes 1118   Therapy Time missed   Time missed?  No

## 2023-07-23 ENCOUNTER — APPOINTMENT (OUTPATIENT)
Dept: RADIOLOGY | Facility: HOSPITAL | Age: 68
DRG: 949 | End: 2023-07-23
Payer: MEDICARE

## 2023-07-23 PROCEDURE — 97116 GAIT TRAINING THERAPY: CPT

## 2023-07-23 PROCEDURE — 92526 ORAL FUNCTION THERAPY: CPT

## 2023-07-23 PROCEDURE — 99232 SBSQ HOSP IP/OBS MODERATE 35: CPT | Performed by: INTERNAL MEDICINE

## 2023-07-23 PROCEDURE — 97530 THERAPEUTIC ACTIVITIES: CPT

## 2023-07-23 PROCEDURE — 99232 SBSQ HOSP IP/OBS MODERATE 35: CPT

## 2023-07-23 PROCEDURE — 97535 SELF CARE MNGMENT TRAINING: CPT

## 2023-07-23 PROCEDURE — 73030 X-RAY EXAM OF SHOULDER: CPT

## 2023-07-23 PROCEDURE — 97110 THERAPEUTIC EXERCISES: CPT

## 2023-07-23 RX ORDER — SENNOSIDES 8.6 MG
1 TABLET ORAL 2 TIMES DAILY
Status: DISCONTINUED | OUTPATIENT
Start: 2023-07-23 | End: 2023-07-29 | Stop reason: HOSPADM

## 2023-07-23 RX ORDER — DOCUSATE SODIUM 100 MG/1
100 CAPSULE, LIQUID FILLED ORAL 2 TIMES DAILY
Status: DISCONTINUED | OUTPATIENT
Start: 2023-07-23 | End: 2023-07-29 | Stop reason: HOSPADM

## 2023-07-23 RX ADMIN — PANTOPRAZOLE SODIUM 40 MG: 40 TABLET, DELAYED RELEASE ORAL at 05:04

## 2023-07-23 RX ADMIN — BUPROPION HYDROCHLORIDE 100 MG: 100 TABLET, FILM COATED ORAL at 16:34

## 2023-07-23 RX ADMIN — MAGNESIUM OXIDE TAB 400 MG (241.3 MG ELEMENTAL MG) 400 MG: 400 (241.3 MG) TAB at 11:10

## 2023-07-23 RX ADMIN — DICLOFENAC SODIUM 2 G: 10 GEL TOPICAL at 11:12

## 2023-07-23 RX ADMIN — MAGNESIUM OXIDE TAB 400 MG (241.3 MG ELEMENTAL MG) 400 MG: 400 (241.3 MG) TAB at 16:30

## 2023-07-23 RX ADMIN — SIMETHICONE 80 MG: 80 TABLET, CHEWABLE ORAL at 11:11

## 2023-07-23 RX ADMIN — APIXABAN 5 MG: 5 TABLET, FILM COATED ORAL at 11:11

## 2023-07-23 RX ADMIN — HYDROXYCHLOROQUINE SULFATE 200 MG: 200 TABLET ORAL at 16:32

## 2023-07-23 RX ADMIN — MORPHINE SULFATE 7.5 MG: 15 TABLET ORAL at 12:34

## 2023-07-23 RX ADMIN — DICLOFENAC SODIUM 2 G: 10 GEL TOPICAL at 21:03

## 2023-07-23 RX ADMIN — CINACALCET 30 MG: 30 TABLET, FILM COATED ORAL at 08:17

## 2023-07-23 RX ADMIN — APIXABAN 5 MG: 5 TABLET, FILM COATED ORAL at 16:30

## 2023-07-23 RX ADMIN — METOPROLOL SUCCINATE 25 MG: 25 TABLET, FILM COATED, EXTENDED RELEASE ORAL at 12:35

## 2023-07-23 RX ADMIN — SIMETHICONE 80 MG: 80 TABLET, CHEWABLE ORAL at 21:02

## 2023-07-23 RX ADMIN — ACETAMINOPHEN 650 MG: 325 TABLET, FILM COATED ORAL at 12:34

## 2023-07-23 RX ADMIN — BUPROPION HYDROCHLORIDE 100 MG: 100 TABLET, FILM COATED ORAL at 11:10

## 2023-07-23 RX ADMIN — HYDROXYCHLOROQUINE SULFATE 200 MG: 200 TABLET ORAL at 08:18

## 2023-07-23 RX ADMIN — DULOXETINE HYDROCHLORIDE 60 MG: 60 CAPSULE, DELAYED RELEASE ORAL at 16:30

## 2023-07-23 RX ADMIN — LEVOFLOXACIN 250 MG: 250 TABLET, FILM COATED ORAL at 12:38

## 2023-07-23 RX ADMIN — MELATONIN TAB 3 MG 3 MG: 3 TAB at 21:03

## 2023-07-23 RX ADMIN — ACETAMINOPHEN 650 MG: 325 TABLET, FILM COATED ORAL at 00:26

## 2023-07-23 RX ADMIN — ACETAMINOPHEN 650 MG: 325 TABLET, FILM COATED ORAL at 16:30

## 2023-07-23 RX ADMIN — DULOXETINE HYDROCHLORIDE 60 MG: 60 CAPSULE, DELAYED RELEASE ORAL at 11:11

## 2023-07-23 RX ADMIN — LAMOTRIGINE 150 MG: 100 TABLET ORAL at 21:02

## 2023-07-23 RX ADMIN — DOCUSATE SODIUM 100 MG: 100 CAPSULE ORAL at 17:08

## 2023-07-23 RX ADMIN — ACETAMINOPHEN 650 MG: 325 TABLET, FILM COATED ORAL at 05:04

## 2023-07-23 RX ADMIN — SENNOSIDES 8.6 MG: 8.6 TABLET, FILM COATED ORAL at 17:08

## 2023-07-23 RX ADMIN — SIMETHICONE 80 MG: 80 TABLET, CHEWABLE ORAL at 16:30

## 2023-07-23 RX ADMIN — ONDANSETRON 4 MG: 4 TABLET, ORALLY DISINTEGRATING ORAL at 05:04

## 2023-07-23 RX ADMIN — GABAPENTIN 300 MG: 300 CAPSULE ORAL at 16:30

## 2023-07-23 RX ADMIN — LIDOCAINE 1 PATCH: 50 PATCH CUTANEOUS at 21:02

## 2023-07-23 RX ADMIN — DICLOFENAC SODIUM 2 G: 10 GEL TOPICAL at 16:37

## 2023-07-23 RX ADMIN — GABAPENTIN 300 MG: 300 CAPSULE ORAL at 11:10

## 2023-07-23 NOTE — PROGRESS NOTES
07/23/23 0915   Pain Assessment   Pain Assessment Tool 0-10   Pain Score 5   Pain Location/Orientation Orientation: Bilateral;Location: Back   Hospital Pain Intervention(s) Emotional support;Rest;Repositioned   Restrictions/Precautions   Precautions Aspiration;Bed/chair alarms; Fall Risk;Cognitive;1:1;Impulsive;Pain;Supervision on toilet/commode;Spinal precautions   Weight Bearing Restrictions No   ROM Restrictions Yes  (spinal precautions)   Braces or Orthoses TLSO;Other (Comment)  (Backpack TLSO)   Lifestyle   Autonomy "I don't like the brace but I think I have to wear it."   Sit to Stand   Type of Assistance Needed Physical assistance   Physical Assistance Level 25% or less   Comment Min A with RW   Sit to Stand CARE Score 3   Bed-Chair Transfer   Type of Assistance Needed Physical assistance   Physical Assistance Level 25% or less   Comment Min A with RW   Chair/Bed-to-Chair Transfer CARE Score 3   Cognition   Overall Cognitive Status Impaired   Arousal/Participation Alert; Cooperative   Attention Attends with cues to redirect   Orientation Level Oriented X4   Memory Decreased short term memory;Decreased recall of recent events;Decreased recall of precautions   Following Commands Follows one step commands with increased time or repetition   Assessment   Treatment Assessment Pt participated in 90 min OT session with fair activity tolerance with focus on ADL training and therapeutic activities. Pt in bathroom with 1:1 aid upon therapist entering room. Pt per 1:1 refusing to wear TLSO. Pt educated on importance of TLSO, dr's orders, and TLSO placed on pt for pt's safety. Pt understanding in the moment however required multiple verbal cues and reminders throughout OT session as to why she is required to wear. Pt able to however recall spinal precautions from yesterday's OT session with no verbal cues needed.  Education/demonstration provided to pt on [facility] training for ease of LB dressing routine, doffing/donning socks. Pt required extensive timeliness for training with Setpan Fry with repetition provided 3x with pt able to carryover 50% of activity. In addition TLSO brace training with donning/doffing provided however pt unable to carryover with recommendation for family training needed to assist pt with donning/doffing. Recommend 24 hour Sup/Assist due to current assistance needed with LB dressing and TLSO management. Team discussion needed for pt d/c at this time. Full time staff made aware. Pt participated in [facility] training seated and in standing at table top with no LOB noted and Min A for standing balance for carryover of spinal precautions/balance in stance. Pt able to recall need for [address] reacher throughout activity with pt able to carryover. Pt participated in functional SPT from w/c to bed at end of therapy session at [address] with RW. Pt seated supported in bed with HOB at 30* with TLSO doffed. Bed alarm on, 1:1 present and all needs within reach. Pt limited by new spinal precautions, TLSO management, decreased reach with increased need for assistance with LB dressing routine, and decreased overall act tolerance/strength. Pt would benefit from continued OT services to progress pt with strength, endurance, balance, cognition, ADL training, safety training, and activity tolerance training for decreased caregiver burden upon d/c. Continue with established POC at this time. Prognosis Fair   Problem List Decreased strength;Decreased range of motion;Decreased endurance; Impaired balance;Decreased mobility; Decreased cognition; Impaired judgement;Decreased safety awareness;Orthopedic restrictions;Pain   Barriers to Discharge Inaccessible home environment;Decreased caregiver support   Plan   Treatment/Interventions ADL retraining;Functional transfer training;LE strengthening/ROM; Therapeutic exercise; Endurance training;Cognitive reorientation;Patient/family training;Equipment eval/education; Bed mobility; Compensatory technique education   OT Therapy Minutes   OT Time In 0915   OT Time Out 1045   OT Total Time (minutes) 90   OT Mode of treatment - Individual (minutes) 90   OT Mode of treatment - Concurrent (minutes) 0   OT Mode of treatment - Group (minutes) 0   OT Mode of treatment - Co-treat (minutes) 0   OT Mode of Treatment - Total time(minutes) 90 minutes   OT Cumulative Minutes 2127   Therapy Time missed   Time missed?  No

## 2023-07-23 NOTE — PLAN OF CARE
Problem: PAIN - ADULT  Goal: Verbalizes/displays adequate comfort level or baseline comfort level  Description: Interventions:  - Encourage patient to monitor pain and request assistance  - Assess pain using appropriate pain scale  - Administer analgesics based on type and severity of pain and evaluate response  - Implement non-pharmacological measures as appropriate and evaluate response  - Consider cultural and social influences on pain and pain management  - Notify physician/advanced practitioner if interventions unsuccessful or patient reports new pain  Outcome: Progressing     Problem: INFECTION - ADULT  Goal: Absence or prevention of progression during hospitalization  Description: INTERVENTIONS:  - Assess and monitor for signs and symptoms of infection  - Monitor lab/diagnostic results  - Monitor all insertion sites, i.e. indwelling lines, tubes, and drains  - Monitor endotracheal if appropriate and nasal secretions for changes in amount and color  - Mobile appropriate cooling/warming therapies per order  - Administer medications as ordered  - Instruct and encourage patient and family to use good hand hygiene technique  - Identify and instruct in appropriate isolation precautions for identified infection/condition  Outcome: Progressing  Goal: Absence of fever/infection during neutropenic period  Description: INTERVENTIONS:  - Monitor WBC    Outcome: Progressing     Problem: SAFETY ADULT  Goal: Patient will remain free of falls  Description: INTERVENTIONS:  - Educate patient/family on patient safety including physical limitations  - Instruct patient to call for assistance with activity   - Consult OT/PT to assist with strengthening/mobility   - Keep Call bell within reach  - Keep bed low and locked with side rails adjusted as appropriate  - Keep care items and personal belongings within reach  - Initiate and maintain comfort rounds  - Make Fall Risk Sign visible to staff  - Offer Toiletingrs, in advance of need  - Initiate/Maint  - Obtain necessary fall risk ma- Apply yellow socks and bracelet for high fall risk patients  - Consider moving patient to room near nurses station  Outcome: Progressing  Goal: Maintain or return to baseline ADL function  Description: INTERVENTIONS:  -  Assess patient's ability to carry out ADLs; assess patient's baseline for ADL function and identify physical deficits which impact ability to perform ADLs (bathing, care of mouth/teeth, toileting, grooming, dressing, etc.)  - Assess/evaluate cause of self-care deficits   - Assess range of motion  - Assess patient's mobility; develop plan if impaired  - Assess patient's need for assistive devices and provide as appropriate  - Encourage maximum independence but intervene and supervise when necessary  - Involve family in performance of ADLs  - Assess for home care needs following discharge   - Consider OT consult to assist with ADL evaluation and planning for discharge  - Provide patient education as appropriate  Outcome: Progressing  Goal: Maintains/Returns to pre admission functional level  Description: INTERVENTIONS:  - Perform BMAT or MOVE assessment daily.   - Set and communicate daily mobility goal to care team and patient/family/caregiver. - Collaborate with rehabilitation services on mobility goals if consulted  - Perform Range ofes a day. - Reposition patienurs.   - Dangle luis  - Stand patient   - Ambulate patient  - Out of bed to chair  - Out of b  - Record patient progress and toleration of activity level   Outcome: Progressing     Problem: DISCHARGE PLANNING  Goal: Discharge to home or other facility with appropriate resources  Description: INTERVENTIONS:  - Identify barriers to discharge w/patient and caregiver  - Arrange for needed discharge resources and transportation as appropriate  - Identify discharge learning needs (meds, wound care, etc.)  - Arrange for interpretive services to assist at discharge as needed  - Refer to Case Management Department for coordinating discharge planning if the patient needs post-hospital services based on physician/advanced practitioner order or complex needs related to functional status, cognitive ability, or social support system  Outcome: Progressing     Problem: Prexisting or High Potential for Compromised Skin Integrity  Goal: Skin integrity is maintained or improved  Description: INTERVENTIONS:  - Identify patients at risk for skin breakdown  - Assess and monitor skin integrity  - Assess and monitor nutrition and hydration status  - Monitor labs   - Assess for incontinence   - Turn and reposition patient  - Assist with mobility/ambulation  - Relieve pressure over bony prominences  - Avoid friction and shearing  - Provide appropriate hygiene as needed including keeping skin clean and dry  - Evaluate need for skin moisturizer/barrier cream  - Collaborate with interdisciplinary team   - Patient/family teaching  - Consider wound care consult   Outcome: Progressing     Problem: Nutrition/Hydration-ADULT  Goal: Nutrient/Hydration intake appropriate for improving, restoring or maintaining nutritional needs  Description: Monitor and assess patient's nutrition/hydration status for malnutrition. Collaborate with interdisciplinary team and initiate plan and interventions as ordered. Monitor patient's weight and dietary intake as ordered or per policy. Utilize nutrition screening tool and intervene as necessary. Determine patient's food preferences and provide high-protein, high-caloric foods as appropriate.      INTERVENTIONS:  - Monitor oral intake, urinary output, labs, and treatment plans  - Assess nutrition and hydration status and recommend course of action  - Evaluate amount of meals eaten  - Assist patient with eating if necessary   - Allow adequate time for meals  - Recommend/ encourage appropriate diets, oral nutritional supplements, and vitamin/mineral supplements  - Order, calculate, and assess calorie counts as needed  - Recommend, monitor, and adjust tube feedings and TPN/PPN based on assessed needs  - Assess need for intravenous fluids  - Provide specific nutrition/hydration education as appropriate  - Include patient/family/caregiver in decisions related to nutrition  Outcome: Progressing     Problem: MOBILITY - ADULT  Goal: Maintain or return to baseline ADL function  Description: INTERVENTIONS:  -  Assess patient's ability to carry out ADLs; assess patient's baseline for ADL function and identify physical deficits which impact ability to perform ADLs (bathing, care of mouth/teeth, toileting, grooming, dressing, etc.)  - Assess/evaluate cause of self-care deficits   - Assess range of motion  - Assess patient's mobility; develop plan if impaired  - Assess patient's need for assistive devices and provide as appropriate  - Encourage maximum independence but intervene and supervise when necessary  - Involve family in performance of ADLs  - Assess for home care needs following discharge   - Consider OT consult to assist with ADL evaluation and planning for discharge  - Provide patient education as appropriate  Outcome: Progressing  Goal: Maintains/Returns to pre admission functional level  Description: INTERVENTIONS:  - Perform BMAT or MOVE assessment daily.   - Set and communicate daily mobility goal to care team and patient/family/caregiver. - Collaborate with rehabilitation services on mobility goals if consulted  - Perform Range of Motion  times a day. - Reposition patiens. - Dangle patie  - Stand patien  - Ambulate patie  - Out of bed t  - Out of be  - Out of bed for toileting  - Record patient progress and toleration of activity level   Outcome: Progressing     Problem: Nutrition/Hydration-ADULT  Goal: Nutrient/Hydration intake appropriate for improving, restoring or maintaining nutritional needs  Description: Monitor and assess patient's nutrition/hydration status for malnutrition.  Collaborate with interdisciplinary team and initiate plan and interventions as ordered. Monitor patient's weight and dietary intake as ordered or per policy. Utilize nutrition screening tool and intervene as necessary. Determine patient's food preferences and provide high-protein, high-caloric foods as appropriate.      INTERVENTIONS:  - Monitor oral intake, urinary output, labs, and treatment plans  - Assess nutrition and hydration status and recommend course of action  - Evaluate amount of meals eaten  - Assist patient with eating if necessary   - Allow adequate time for meals  - Recommend/ encourage appropriate diets, oral nutritional supplements, and vitamin/mineral supplements  - Order, calculate, and assess calorie counts as needed  - Recommend, monitor, and adjust tube feedings and TPN/PPN based on assessed needs  - Assess need for intravenous fluids  - Provide specific nutrition/hydration education as appropriate  - Include patient/family/caregiver in decisions related to nutrition  Outcome: Progressing

## 2023-07-23 NOTE — PROGRESS NOTES
07/23/23 0800   Pain Assessment   Pain Assessment Tool 0-10   Pain Score No Pain   Restrictions/Precautions   Precautions 1:1;Aspiration;Bed/chair alarms;Cognitive; Fall Risk;Impulsive;Pain;Supervision on toilet/commode;Spinal precautions   Weight Bearing Restrictions No   ROM Restrictions Yes  (spinal precautions)   Braces or Orthoses TLSO;Other (Comment)  (backpack TLSO)   Comprehension   Comprehension (FIM) 4 - Understands basic info/conversation 75-90% of time   Expression   Expression (FIM) 4 - Expresses basic info/needs 75-90% of time   Social Interaction   Social Interaction (FIM) 5 - Interacts appropriately with others 90% of time   Problem Solving   Problem solving (FIM) 4 - Solves basic problems 75-89% of time   Memory   Memory (FIM) 4 - Recognizes/recalls/performs 75-89%   Eating   Type of Assistance Needed Supervision   Physical Assistance Level No physical assistance   Eating CARE Score 4   Swallow Assessment   Swallow Treatment Assessment Daily Dysphagia Tx Note      Patient Name: Yoly Rivas     Today's Date: 7/23/2023        Current Risks for Dysphagia & Aspiration: Weak voicing; weak cough; dysarthria; general debilitation; new neuro event; brain injury; cognitive deficit; hx neurologic dx; positioning issues     Current Symptoms/Concerns: difficulty chewing; decreased oral intake; hx of dysphagia/aspiration; throat clearing; cough with intake     Current diet:puree/level 1 diet and nectar thick liquids      Premorbid diet::puree/level 1 diet and nectar thick liquids      Positioning: pt fully upright in bed for meal     Items administered:Consistencies Administered: nectar thick, puree, pills with puree  Materials administered included: pureed S[place]inese toast, pureed eggs, pureed peaches, NTL by cup, pills in puree  Please note, pt declined trialing Level 2 diet items this date.     Total amount of meal consumed:   25% of pureed [place] toast  80% pureed eggs  100% of pureed peaches  40cc of NTL cup        Oral stage:mild-moderate  Lip closure: functional  Anterior spillage: no spillage, but trace residue on L upper/lower lips w/ pureed peaches  Mastication: mild-moderately prolonged  Bolus formation: decreased  Bolus control: decreased  Transfer: mild delayed, but occasional timely transfers  Oral residue: trace amounts of lingual residue with pureed peaches  Pocketing: none           Pharyngeal stage:moderate  Swallow promptness: mild-moderately delayed today  Hyolaryngeal elevation: present, but continues to be decreased at times  Wet voice: none  Throat clear: x1 with pureed peaches (suspect due to delayed oral transfer)  Cough: none  Secondary swallows: consistent throughout meal  Audible swallows: intermittently with all consistencies, especially with SLP would palpate swallow.        Esophageal stage: none observed or reported        Summary:     Pt presenting with mild-moderate oral and mild-moderate pharyngeal dysphagia today. Symptoms or concerns included decreased bolus acceptance, decreased bolus propulsion, decreased mastication, decreased bolus formation, delayed oral intiation, suspected decreased control of NTL by cup , occasional trace oral residue, suspected decreased hyolaryngeal elevation upon palpation at times, suspected pharyngeal residue, multiple swallows, effortful swallow, audible swallows and dealyed spontaneous throat clear x1 with pureed peaches.     Pt was alert and awake when SLP entered. Nursing provided morning medications in pureed peaches without overt s/sx of aspiration. Pt was able to fed herself. Appopriate lip seal around spoon and cup, but trace L labial residue x1 with pureed peaches. No anterior spillage. Pt occasionally took large bites of pureed eggs suspect due to difficulty with cutting smaller pieces; however, mildly prolonged but functional mastication and breakdown.  Oral maniuplation and bolus control was better with pureed eggs/[place]enian toast (thicker puree) versus fruit puree (thinner puree). Trace lingual residue 1x with pureed peaches due to moderately delayed oral transfer. Occasional timely tranfers with purees. Upon palpation, pt demonstrated fair hyolaryngeal excursion and effortful swallow, but occasional weakness/decreased rise noted. Audible swallows noted at times, especially when SLP palpated for swallow, across consistencies. Throat clear x1 with pureed peaches; otherwise, no overt s/sx of aspiration. SLP offered to trial Level 2 items, but pt declined. Pt requesting to drink regular coffee- SLP explained that SLP could order trial tray to trial thin liquid and Level 2 items. Also discussed trialing Level 1 (IDDSI 'slightly' thick) with coffee- pt appeared open to this. Pt independently completed oral care given initial set up by SLP. She appropriately and throughout cleaned her oral cavity via toothbrushing, floss, and mouth wash.  She was able to safely rinse and spit thin liquid without overt s/sx of aspiration.      Recommendations:  Diet: puree/level 1 diet and nectar thick liquids  Meds: whole or cut with puree  Strategies: upright posture, only feed when fully alert, slow rate of feeding, small bites/sips, effortful swallow, quiet environment (tv off, limit talking, door closed, etc.) and alternating bites and sips     FULL supervision w/ meals.    Results reviewed with: patient, RN  Aspiration precautions posted.     F/u ST tx: Pt is currently recommended for further skilled SLP services targeting dysphagia therapy in order to maximize oral and pharyngeal swallow skills, while safely supporting PO intake, as well as to improve independent carryover of safe swallow strategies.      Plan: Cont to monitor pt on current diet              Trial upgraded textures of dysphagia level 2 and thin liquids as appropriate (consider trialing IDDSI Level 1- slightly thick liquid)              Will benefit from f/u with GI team which pt follows with [facility] given esophageal concerns.               Cont education on puree diet and NTL with strategies for at home (education folder started in room with handouts)   Swallow Assessment Prognosis   Prognosis Fair   Prognosis Considerations Age; Co-morbidities; Family/community support;Medical diagnosis; Medical prognosis;Participation level;Previous level of function;Severity of impairments;New learning ability;Ability to carry over   SLP Therapy Minutes   SLP Time In 0800   SLP Time Out 0845   SLP Total Time (minutes) 45   SLP Mode of treatment - Individual (minutes) 45   SLP Mode of treatment - Concurrent (minutes) 0   SLP Mode of treatment - Group (minutes) 0   SLP Mode of treatment - Co-treat (minutes) 0   SLP Mode of Treatment - Total time(minutes) 45 minutes   SLP Cumulative Minutes 560   Therapy Time missed   Time missed?  No

## 2023-07-23 NOTE — PROGRESS NOTES
Physical Medicine and Rehabilitation Progress Note  Annika Baez 70 y.o. female MRN: [id number]  Unit/Bed#: [facility] 970-01 Encounter: [id number]      Assessment & Plan:     T12 mild anterior wedging (chronic compression fxs at T9, T11, L1 c/ kyphoplasty, L1 and L4, with chronic severe kyphosis)  - Thoracic spine precautions  - Initially resistant but now agrees to TLSO   - Follow-up NSx in 2 weeks with repeat TL XR   - Continue PT, OT, CG training - will recommend extending ARC course    -Mild anterior wedging of T12, not present on the recent prior CT. A fracture line is not visualized and this is age-indeterminate. 2.  Otherwise stable chronic compression fractures at T9, T11, and L1 with kyphoplasty at L1. 3.  Kyphosis at the thoracolumbar junction. L spine XR - No acute fracture. Stable chronic compression deformities at L1 and L4.   - Nsx consult recommends TLSO brace with OOB and HOB >45 deg   ? "Mild age-indeterminate T12 compression, multiple  Chronic TL compression fracture. Hx of fall. TL pain, and tenderness noted, otherwise neurologically non focal. Upright Thoracic XR stable mild anterior compression-age indeterminate. From NSx perspective no procedure is anticipated, continue conservative treatment with bracing, PT/OT and pain control. Follow-up in 2 weeks with upright thoracolumbar spine x-rays in brace.  S/O.    - if develop red flag signs - acute neurologic changes, etc- recommend MRI spine and urgent Nsx c/s in interim  - strength/sensation unchanged in legs, no acute b/b changes   - Continue high fall precautions - 1:1   - Optimal pain mgm but try to avoid oversedation, AMS, or resp depression    - Reviewed plan with daughter 7/22 who is agreement   - Patient with hx of elevated Calcium so would avoid Ca and Vit D supplementation at this time; she was on Prolia in past - would recommend OP follow-up with endocrinology/PCP given osteoporesis/compression fracture hx      Unwitnessed fall, landing on buttocks with residual L buttock tenderness on Eliquis; L buttock pain better; but now some increased back pain found to have mild age-indeteminate T12 comp fx  - T/L XR Spine ordered   T spine -  1.  Mild anterior wedging of T12, not present on the recent prior CT. A fracture line is not visualized and this is age-indeterminate. 2.  Otherwise stable chronic compression fractures at T9, T11, and L1 with kyphoplasty at L1. 3.  Kyphosis at the thoracolumbar junction. L spine XR - No acute fracture. Stable chronic compression deformities at L1 and L4.  - 7/20 CBC/BMP unremarkable with improving Hb   - 7/19 - No head/neck trauma - denied H/N pain, no midline or paraspinal TTP, AROM/PROM of neck full without pain, no scalp edema, bruising; denies h/a, neck pain, neuropathic complaints, neuro exam - strength at baseline  [facility] protocol to obtain C-spine and head CTH stat 7/19  - C spine CT - No acute fracture or evidence for traumatic malalignment.  No interval change since prior exam.   - CTH - no acute findings  - L hip/pelvis XR without acute findings   - Continue A/C   - Recommend 1:1 as patient somewhat confused at times particularly at night and has had fall/wondering episode  - Patient with mild confusion at times  - Limit sedating meds when possible but need to still treat pain adequately   - Treating possible UTI with >100 K enterococcus, some intermittent confusion, recent dysuria  - Ensure call bell in place - discussed with nursing   - high fall precautions - alarms, not unattended in bathroom, etc  - Monitor subacute symptoms or changes in exam  - Recommend alarms at home with family       Decline in ADLs and mobility  Multifactorial: Recent fall with hematoma, recent stroke with residual deficits, CHF, pain, deconditioning, recent UTI and now some increased pain and functional decline s/p unwitnessed fall; now with possible new T12 vertebral wedging and pain now with TLSO brace  - Optimal management of each as listed -   - Thoracic spine precautions; TLSO brace  - Neuro exam grossly stable > continue to monitor for acute changes  - Vitals stable  - Treat possible UTI   - Optimal pain control   - Continue acute comprehensive interdisciplinary inpatient rehabilitation to include intensive skilled therapies (PT, OT, ST) with oversight and management by rehabilitation physician as well as inpatient rehab level nursing, case management and weekly interdisciplinary team meetings to improved functional gains again and to ensure adequate caregiver training - discussed with pt/family   - Updated daughter again      Possible UTI on recurrent UTIs that can cause confusion per family and increase risk of falls  - was c/o dysuria recently prompting UA/UC   - >100K enterococcus s/p recent Macrobid for same organism  - IM c/s and recommend levaquin; follow-up susceptibilities   - Recommend urology c/s   - Proactive toileting    History of CHF with ejection fraction of 30%, history of hypertension with relative hypotension recently, recent history of sinus tachycardia  BP overall acceptable   Comgmt with medicine who is consulted   Compensation:  Appears adequate  Monitor weights, vitals, and physical exam  Medications per recommendations of prior providers and our medicine team:   Metoprolol with holding parameters  Spironolactone now held with soft BP  Prazosin stopped earlier  Monitor electrolytes and ensure optimal management  BMP acceptable 7/20     History of stroke and refractory dysphagia, impaired intake, dehydration; protein jacklyn malnutrition, wt loss   Eliquis, optimal blood pressure control  Fall precautions  Continue PT, OT, ST  Dysphagia 1 nectar thick liquid, monitor fluid and caloric intake  Monitor labs intermittently  SLP reports swallow stable from prior admission but concern for esophageal dysmotility/stricture   - Esophagram held due to degree of dysphagia and risk of aspiration with barium; her OP GI doc recommended esophogram with air but that too has some barium  - Nutrition c/s'd and following noting significant wt loss, muscle wasting   - Calorie counts, close monitoring intake, wts  - Counseled patient/family on need to improve intake on mod diet  - Patient refusing PEG tube; encourage improved intake on current diet - did better with supplements recently  - Patient will need OP GI follow-up and consideration for EGD and PEG - if she changes her mind and wants to consider PEG now than we will c/s GI here      History of left leg hematoma, anemia  Improving now s/p fall in hospital   Hb overall stable s/p fall 7/19 > monitor CBC Monday    monitor leg, vitals, labs intermittently  Patient follow-up with general surgery  Was cleared to continue Eliquis     Bipolar depression history  Mood  stable today   Continue Bupropion, Cymbalta   Supportive counseling provided   Psychology consulted while in [address] on and continue to encourage deep breathing/relaxation/behavioral management techniques     Pain mgmt   -SA MS Contin TID PRN with holding parameters; Hold for oversedation, AMS, or RR<12.  - Decreased gabapentin slightly from 400mg BID to 300mg BID to try to balance pain mgmt and avoid oversedation   - Continue APAP   - Closely monitor pain and Ses of pain meds     Sundowning/delirium  Assessment & Plan  - More confusion, than agitation  - Wandered all the way down to the cafeteria on the 1st floor on 7/9 and now recent fall 7/20 with possible new mild age-indet T12 comp fx  - Recommend in person 1:1   - Again UCx with 100k enterococcus. > now on levaquin  - Neuro exam stable without focal changes; CMP largely unremarkable to explain mentation; CBC stable without leukocytosis;  Ammonia wnl   - High fall precautions  - Delirium precautions  - Limit sedating meds when possible while still adequately treating pain     Mild intermittent asthma without complication  Assessment & Plan  No acute exacerbation  Home: Albuterol PRN  Here: Same    Hypercalcemia  Assessment & Plan  Suspect 2/2 primary hyperparathyroidism  7/8 iCal 1.12, Phos 3.3  Home: Sensipar 30mg daily  Here: Same  Recommend outpatient f/u with Endocrinology    Cervical dystonia  Assessment & Plan  Recommend outpatient f/u with Dr. Tesfaye Washington to evaluate for botulinum toxin injections  Appears to have a retrocollis. Night terrors  Assessment & Plan  Home: Prazosin  Here: none  Continue melatonin  Outpatient f/u with PCP      Hypomagnesemia  Assessment & Plan  Home: Mag oxide 800mg BID  Here: Mag oxide 400mg BID  Monitor intermittently     Sinus tachycardia  Assessment & Plan  Here: Toprol XL qday   IM consulted to assist with management  Outpatient f/u with Dr. Yancy Mireles      History of stroke  Assessment & Plan  MRI brain 2/22 - 1.  New small acute /more recent interval lacunar infarction in the right cerebral peduncle (series 4, image 14). 2.  Extensive multifocal large, previously present right middle cerebral artery infarction which is evolving with superimposed areas of evolving hemorrhage likely related to hemorrhagic transformation of subacute right MCA infarction.  No significant   mass effect. 3.  Mild, chronic microangiopathy. S/P thrombectomy 2/12 by Dr Ruby Lee   Secondary stroke prophylaxis at this time with Eliquis 5 mg twice daily   PT, OT, SLP. Review modifiable risk factors and provide stroke education  Monitor for poststroke pain  Eliquis for secondary stroke prophylaxis  Outpatient f/u with Neurology    Obstructive sleep apnea  Assessment & Plan  Previously non-compliant with hospital or home CPAP  Outpatient f/u with PCP.   2L O2 here HS     Osteoporosis  Assessment & Plan  Hx L1 compression fx s/p kyphoplasty  Was previously on Prolia - last given in 2020   - Held due to dental issues  Recommend outpatient f/u with Endocrinology  2/23 Vitamin D level is 41.7  Hold Ca/Vit D with hyperCa hx       Mixed stress and urge urinary incontinence  Assessment & Plan  Toileting program  Treat UTI  Urology c/s     Gastroesophageal reflux disease without esophagitis  Assessment & Plan  Home: PPI daily  Here: PPI daily    Seronegative arthropathy of multiple sites [facility])  Assessment & Plan  Follows with Dr. Robb Red with Rheum and is on Hydroxychloroquine 200mg BID. Continue home regimen and follow-up with rheumatology on discharge      CODE: Level 1: Full Code    Restrictions include: Fall precautions    Objective: Allergies per EMR  Diagnostic Studies: Reviewed  XR spine thoracic 3 vw   Final Result by Nova Wolfe MD (07/21 1706)      1. Mild anterior wedging of T12, not present on the recent prior CT. A fracture line is not visualized and this is age-indeterminate. 2.  Otherwise stable chronic compression fractures at T9, T11, and L1 with kyphoplasty at L1.      3.  Kyphosis at the thoracolumbar junction. The study was marked in [facility] for immediate notification. Workstation performed: [id number]         XR spine lumbar 2 or 3 views injury   Final Result by Nova Wolfe MD (07/21 1708)      No acute fracture. Stable chronic compression deformities at L1 and L4. Workstation performed: [id number]         CT head wo contrast   Final Result by Sami Hines MD (07/19 2025)      No interval change. No acute intracranial hemorrhage or depressed calvarial fracture identified. Chronic right MCA distribution infarct. Workstation performed: [id number]         CT spine cervical wo contrast   Final Result by Sami Hines MD (07/19 2029)      No acute fracture or evidence for traumatic malalignment. No interval change since prior exam.                  Workstation performed: [id number]         XR hip/pelv 2-3 vws left if performed   Final Result by Jennifer Ramos MD (07/19 1856)      No acute osseous abnormality.             Workstation performed: [id number]         XR spine thoracolumbar 2 vw (Results Pending)     See above as well    Laboratory: Labs reviewed  Results from last 7 days   Lab Units 07/22/23  0900 07/20/23  0643 07/18/23  0516   HEMOGLOBIN g/dL 9.6* 10.9* 9.9*   HEMATOCRIT % 30.4* 36.2 30.9*   WBC Thousand/uL 7.76 4.78 5.07     Results from last 7 days   Lab Units 07/22/23  0916 07/20/23  0643 07/18/23  0516   BUN mg/dL 16 17 18   SODIUM mmol/L 142 140 143   POTASSIUM mmol/L 4.2 4.1 4.3   CHLORIDE mmol/L 110* 111* 109*   CREATININE mg/dL 0.92 0.89 0.84   AST U/L 21  --   --    ALT U/L 15  --   --             Drug regimen reviewed, all potential adverse effects identified and addressed:    Scheduled Meds:  Current Facility-Administered Medications   Medication Dose Route Frequency Provider Last Rate   • acetaminophen  650 mg Oral Q6H [address] Lloyd Welsh MD     • albuterol  2 puff Inhalation Q6H PRN Lloyd Welsh MD     • apixaban  5 mg Oral BID Lloyd Welsh MD     • buPROPion  100 mg Oral BID Lloyd Welsh MD     • cinacalcet  30 mg Oral Daily With Breakfast Lloyd Welsh MD     • Diclofenac Sodium  2 g Topical 4x Daily LAURA Rutledge     • DULoxetine  60 mg Oral BID Lloyd Welsh MD     • gabapentin  300 mg Oral BID Sen Pimentel MD     • hydroxychloroquine  200 mg Oral BID With Meals Lloyd Welsh MD     • lamoTRIgine  150 mg Oral HS Lloyd Welsh MD     • levofloxacin  250 mg Oral Q24H Ramonita Julian PA-C     • lidocaine  1 patch Topical HS LAURA Rutledge     • magnesium Oxide  400 mg Oral BID Lloyd Welsh MD     • melatonin  3 mg Oral HS Lloyd Welsh MD     • metoprolol succinate  25 mg Oral Daily LAURA Diaz     • morphine  7.5 mg Oral TID PRN Sen Pimentel MD     • ondansetron  4 mg Oral Q6H PRN Lloyd Welsh MD     • pantoprazole  40 mg Oral Early Morning Lloyd Welsh MD     • polyethylene glycol  17 g Oral Daily PRN LAURA Rutledge     • polyethylene glycol  17 g Oral Once LAURA Landon     • saliva substitute 5 spray Mouth/Throat 4x Daily PRN Ceil Brunner Harleman, CRNP     • simethicone  80 mg Oral 4x Daily (with meals and at bedtime) LAURA Guerrero         Chief Complaints:  Back pain     Subjective: On eval, patient reports some low-mid back pain. She denies buttock/hip pain or shoulder pain. Patient denies pain radiating down legs, worsening strength or sensation, worsening bowel/bladder function, dysuria, fever, chills, sweats, calf pain or other new complaints. - Patient was noted to have some confusion and trying to get out of bed overnight. ROS: A 10 point ROS was performed; negative except as noted above. Physical Exam:  Temp:  [97.4 °F (36.3 °C)-98.7 °F (37.1 °C)] 97.4 °F (36.3 °C)  HR:  [] 100  Resp:  [18-22] 19  BP: (102-106)/(60-70) 103/70  SpO2:  [92 %-94 %] 94 %    GEN:  Sitting in bed in NAD    HEENT/NECK: Normocephalic, atraumatic, moist mucous membranes   CARDIAC: Regular rate rhythm, no murmers, no rubs, no gallops  LUNGS:  clear to auscultation, no wheezes, rales, or rhonchi  ABDOMEN: Soft, non-tender, non-distended, normal active bowel sounds  EXTREMITIES/SKIN:  No increased leg edema or calf TTP  MSK:   Bilateral shoulder ROM near full without significant pain on AROM/PROM; negative miguelina rincon  NEURO:   MENTAL STATUS: awake, oriented to person, place, time, and situation, MENTAL STATUS:  Appropriate wakefulness and interaction , Strength/MMT:  L prox LE about 5-/5 L distal LE 5/5, R side 5/5 - unchanged overall again; sensation stable   PSYCH:  Affect:  Euthymic       ** Please Note: Fluency Direct voice to text software may have been used in the creation of this document. **    I personally performed the required components and examined the patient myself in person on 7/22/23.

## 2023-07-23 NOTE — NURSING NOTE
Pt asking about passing meds and what she needed to do next. RN edcuated it was 3 am and she had no meds due at this time. Pt taken to bathroom by PCA in 1:1. 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 1:1 remains in place.

## 2023-07-23 NOTE — PROGRESS NOTES
07/23/23 1330   Pain Assessment   Pain Assessment Tool 0-10   Pain Score 5   Pain Location/Orientation Location: Back   Restrictions/Precautions   Precautions Aspiration;Bed/chair alarms; Fall Risk;1:1;Cognitive;Pain; Impulsive;Spinal precautions;Supervision on toilet/commode   Weight Bearing Restrictions No   ROM Restrictions Yes  (spinal)   Braces or Orthoses TLSO  (backpack TLSO OOB)   Cognition   Overall Cognitive Status Impaired   Arousal/Participation Alert; Cooperative   Attention Attends with cues to redirect   Orientation Level Oriented X4   Memory Decreased short term memory   Following Commands Follows one step commands with increased time or repetition   Subjective   Subjective Pt ready for PT   Roll Left and Right   Type of Assistance Needed Physical assistance   Physical Assistance Level 25% or less   Comment log rolling bed rail   Roll Left and Right CARE Score 3   Sit to Lying   Type of Assistance Needed Physical assistance   Physical Assistance Level 51%-75%   Sit to Lying CARE Score 2   Lying to Sitting on Side of Bed   Type of Assistance Needed Physical assistance   Physical Assistance Level 51%-75%   Lying to Sitting on Side of Bed CARE Score 2   Sit to Stand   Type of Assistance Needed Physical assistance   Physical Assistance Level 25% or less   Comment Marika with RW   Sit to Stand CARE Score 3   Bed-Chair Transfer   Type of Assistance Needed Physical assistance   Physical Assistance Level 26%-50%   Comment mod-Marika with RW   Chair/Bed-to-Chair Transfer CARE Score 3   Transfer Bed/Chair/Wheelchair   Limitations Noted In Balance;Pain Management   Walk 10 Feet   Type of Assistance Needed Incidental touching   Physical Assistance Level No physical assistance   Comment  RW   Walk 10 Feet CARE Score 4   Walk 50 Feet with Two Turns   Type of Assistance Needed Incidental touching   Physical Assistance Level No physical assistance   Comment  RW   Walk 50 Feet with Two Turns CARE Score 4   Ambulation Primary Mode of Locomotion Prior to Admission Walk   Distance Walked (feet) 77 ft  (x2, 50)   Assist Device Roller Walker   Gait Pattern Inconsistant Amy; Slow Amy;Decreased foot clearance; Improper weight shift   Limitations Noted In Balance; Endurance;Device Management;Speed;Strength; Sequencing   Provided Assistance with: Balance;Weight Shift   Walk Assist Level Contact Guard   Does the patient walk? 2. Yes   Therapeutic Interventions   Strengthening seated knee ext 3 sets of 10; hip abduction with yellow tband; standing at parallel bars hip abduction ble 2 sets of 10   Assessment   Treatment Assessment Kennedy Oneill participated in PT session with focus on bed, transfer and gait training. up to mod/maxA for bed mobiltiy secondary to pain, total assistance for donning/doffing TLSO backpack. Pt does demonstrate min/mod assist for transfers and cg ambulation 75 ft average across multiple trials. Limited by increased pain in back with mobility. Pt is very motivated and would benefit from continued skilled PT to maximize independence and decrease risk of falls. Continue to focus on maintaining spinal precautions with patient education carryover, education on TLSO donning/doffing as well as bed mobility, transfers with RW and ambulation with RW. 1:1 present bedside post session. Family/Caregiver Present no   Barriers to Discharge Inaccessible home environment;Decreased caregiver support   PT Barriers   Physical Impairment Decreased strength;Decreased range of motion;Decreased endurance;Decreased mobility; Impaired balance;Decreased coordination;Decreased cognition; Impaired judgement   Functional Limitation Car transfers; Ramp negotiation;Stair negotiation;Standing;Transfers; Walking   Plan   Treatment/Interventions ADL retraining;Functional transfer training;LE strengthening/ROM; Elevations; Therapeutic exercise; Endurance training;Patient/family training;Equipment eval/education;Gait training;Bed mobility Recommendation   PT Discharge Recommendation Home with home health rehabilitation   Equipment Recommended Walker   PT Therapy Minutes   PT Time In 1330   PT Time Out 1500   PT Total Time (minutes) 90   PT Mode of treatment - Individual (minutes) 90   PT Mode of treatment - Concurrent (minutes) 0   PT Mode of treatment - Group (minutes) 0   PT Mode of treatment - Co-treat (minutes) 0   PT Mode of Treatment - Total time(minutes) 90 minutes   PT Cumulative Minutes 933   Therapy Time missed   Time missed?  No

## 2023-07-23 NOTE — PROGRESS NOTES
Internal Medicine Progress Note  Patient: Rolando Greene  Age/sex: 70 y.o. female  Medical Record #: [id number]      ASSESSMENT/PLAN: (Interval History)  Rolando Greene is seen and examined and management for following issues:    Left lateral thigh hematoma   • Stable     ABLA  • S/p 1 unit PRBCs 7/2/23  • CBC 7/18/23 stable     Fall  • While getting OOB 7/19  • Did not hit head but unwitnessed  • CTH/CT cerv spine with no acute changes  • Xrays with new compression fracture     Thrush  • S/p Nystatin     Chronic combined systolic/diastolic heart failure   • Originally diagnosed 2004 when LVEF then was 10-15% and felt to be NICM  • Her LVEF did recover into 50's then fell when she had her CVA in 2/2023 to 30%  • Also has moderate AI/gr 2 DD  • Sees Dr Ken Madden a LifeVest in 2/2023 when LVEF was found to be 30% which is most current  • Had been on Coreg in past but HF team switched her to Toprol when she had her stroke 2/2023  • Her soft BPs have limited starting her on ACEI/ARB as OP  • Home:  Toprol XL 25mg BID/Aldactone 25mg qd  • Here:  Toprol XL 25 mg QD = held 7/20/23 and 7/21/23  • D/C'd Aldactone 7/16  • euvolemic     Hypercalcemia  • Continue Cinacalcet here as was taking at home  • Calcium 8.9 on 7/18/23     HS hypoxia   • using 4L NC HS at home  • using O2 at night here     Nightmares  • placed on Minipress when here with us after her CVA  • D/w Dr Oscar Sanchez, d/c'd since had been too low to get  • Minipress dc'd 2/2 hypotension     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  • Has residual left sided weakness  • Had recrudescence of CVA 4/2023 with worsening left facial droop  • Continue Eliquis     Dysphagia  • Had admission for aspiration PNA at [facility] 5/2023 tx'd with Levaquin and Flagyl  • Continue pureed diet with NTL as at home  • ST following     Weight loss  • Continue Ensure HP pudding TID and Magic cups TID  • She was on no supplements at home so likely will gain some wt that she is eating/drinking supps here  • adamantly refuses PEG tube  • Nutrition following     Bipolar depression  • Home:  Wellbutrin 100mg BID/Cymbalta 60mg BID/Lamictal 150mg qhs  • Here:  Wellbutrin 100mg BID/Cymbalta 60mg BID/Lamictal 150mg qhs     Rheumatoid arthritis  • Continue Plaquenil 200mg BID as at home     Chronic pain/chronic opioid use  • 2/2 RA and lumbar spondylosis  • Pain management per PMR     Hx incidental right superior hypophyseal artery aneurysm  • Follows with NS Dr Lizzie Winkler     CRE urine 3/2023  • Got Cipro then to [facility] for tx at the time  • Maintain contact isolation     Dye allergy   • Was dye prepped on 7/4/23 with Benadryl 50mg IV x 1 w/o issues     Incidental findings  • Pancolitis with increasing inflammatory free fluid in pelvis  • Calcified granuloma RLL     Hypokalemia  • Got Kdur 40 meq x 2 on 7/8/23  • Stable at 4.1     Enterococcus UTI  • S/p Kinj[address] May Dr Depadua = sensitive  • UA for c/o dysuria again 7/20 = culture again with >100,000 Enterococcus  • Pt has multiple abx allergies. Start Levaquin. Consider ID and Urology input.      Discharge date:  TBD     The above assessment and plan was reviewed and updated as determined by my evaluation of the patient on 7/23/2023.     Labs:   Results from last 7 days   Lab Units 07/22/23  0900 07/20/23  0643   WBC Thousand/uL 7.76 4.78   HEMOGLOBIN g/dL 9.6* 10.9*   HEMATOCRIT % 30.4* 36.2   PLATELETS Thousands/uL 157 197     Results from last 7 days   Lab Units 07/22/23  0916 07/20/23  0643   SODIUM mmol/L 142 140   POTASSIUM mmol/L 4.2 4.1   CHLORIDE mmol/L 110* 111*   CO2 mmol/L 30 28   BUN mg/dL 16 17   CREATININE mg/dL 0.92 0.89   CALCIUM mg/dL 8.7 9.0                   Review of Scheduled Meds:  Current Facility-Administered Medications   Medication Dose Route Frequency Provider Last Rate   • acetaminophen  650 mg Oral Q6H [address] Namrata Verduzco MD     • albuterol  2 puff Inhalation Q6H PRN Namrata Verduzco MD     • apixaban  5 mg Oral BID Lisa Geronimo MD     • buPROPion  100 mg Oral BID Lisa Geronimo MD     • cinacalcet  30 mg Oral Daily With Breakfast Lisa Geronimo MD     • Diclofenac Sodium  2 g Topical 4x Daily LAURA Rutledge     • DULoxetine  60 mg Oral BID Lisa Geronimo MD     • gabapentin  300 mg Oral BID Patt Hall MD     • hydroxychloroquine  200 mg Oral BID With Meals Lisa Geronimo MD     • lamoTRIgine  150 mg Oral HS Lisa Geronimo MD     • levofloxacin  250 mg Oral Q24H Ramonita Julian PA-C     • lidocaine  1 patch Topical HS LAURA Rutledge     • magnesium Oxide  400 mg Oral BID Lisa Geronimo MD     • melatonin  3 mg Oral HS Lisa Geronimo MD     • metoprolol succinate  25 mg Oral Daily LAURA Gomez     • morphine  7.5 mg Oral TID PRN Patt Hall MD     • ondansetron  4 mg Oral Q6H PRN Lisa Geronimo MD     • pantoprazole  40 mg Oral Early Morning Lisa Geronimo MD     • polyethylene glycol  17 g Oral Daily PRN LAURA Rutledge     • polyethylene glycol  17 g Oral Once LAURA Vuong     • saliva substitute  5 spray Mouth/Throat 4x Daily PRN LAURA Olsen     • simethicone  80 mg Oral 4x Daily (with meals and at bedtime) LAURA Nicole         Subjective/ HPI: Patient seen and examined. Patients overnight issues or events were reviewed with nursing or staff during rounds or morning huddle session. New or overnight issues include the following:     Pt seen in OT. She was confused overnight, thinking that she was taking report on patients. She denies any current complaints. ROS:   A 10 point ROS was performed; negative except as noted above.      *Labs /Radiology studies reviewed  *Medications reviewed and reconciled as needed  *Please refer to order section for additional ordered labs studies  *Case discussed with primary attending during morning huddle case rounds    Physical Examination:  Vitals:   Vitals:    07/22/23 2022 07/23/23 0504 07/23/23 0529 07/23/23 0600   BP: 120/62 98/60 100/56    BP Location: Right arm      Pulse: 94      Resp: 18      Temp: 97.9 °F (36.6 °C) 97.8 °F (36.6 °C)     TempSrc: Oral      SpO2: 95% 94%     Weight:    48.4 kg (106 lb 11.2 oz)   Height:           General Appearance: no distress, conversive, frail appearing  HEENT: PERRLA, conjuctiva normal; oropharynx clear; mucous membranes moist   Neck:  Supple, normal ROM  Lungs: CTA, normal respiratory effort, no retractions, expiratory effort normal  CV: regular rate and rhythm; no rubs/murmurs/gallops, PMI normal   ABD: soft; ND/NT; +BS  EXT: no edema  Skin: normal turgor, normal texture, no rashes  Psych: affect normal, mood normal  Neuro: AAO. Not currently confused. The above physical exam was reviewed and updated as determined by my evaluation of the patient on 7/23/2023. Invasive Devices     None                    VTE Pharmacologic Prophylaxis: Eliquis  Code Status: Level 1 - Full Code  Current Length of Stay: 16 day(s)      Total time spent:  30 minutes with more than 50% spent counseling/coordinating care. Counseling includes discussion with patient re: progress  and discussion with patient of his/her current medical state/information. Coordination of patient's care was performed in conjunction with primary service. Time invested included review of patient's labs, vitals, and management of their comorbidities with continued monitoring. In addition, this patient was discussed with medical team including physician and advanced extenders. The care of the patient was extensively discussed and appropriate treatment plan was formulated unique for this patient. Medical decision making for the day was made by supervising physician unless otherwise noted in their attestation statement. ** Please Note:  voice to text software may have been used in the creation of this document.  Although proof errors in transcription or interpretation are a potential of such software**

## 2023-07-24 PROBLEM — B37.0 THRUSH: Status: RESOLVED | Noted: 2023-07-07 | Resolved: 2023-07-24

## 2023-07-24 LAB
ANION GAP SERPL CALCULATED.3IONS-SCNC: 5 MMOL/L
BASOPHILS # BLD AUTO: 0.02 THOUSANDS/ÂΜL (ref 0–0.1)
BASOPHILS NFR BLD AUTO: 1 % (ref 0–1)
BUN SERPL-MCNC: 13 MG/DL (ref 5–25)
CALCIUM SERPL-MCNC: 8.3 MG/DL (ref 8.3–10.1)
CHLORIDE SERPL-SCNC: 111 MMOL/L (ref 96–108)
CO2 SERPL-SCNC: 28 MMOL/L (ref 21–32)
CREAT SERPL-MCNC: 0.76 MG/DL (ref 0.6–1.3)
EOSINOPHIL # BLD AUTO: 0 THOUSAND/ÂΜL (ref 0–0.61)
EOSINOPHIL NFR BLD AUTO: 0 % (ref 0–6)
ERYTHROCYTE [DISTWIDTH] IN BLOOD BY AUTOMATED COUNT: 20 % (ref 11.6–15.1)
GFR SERPL CREATININE-BSD FRML MDRD: 80 ML/MIN/1.73SQ M
GLUCOSE P FAST SERPL-MCNC: 87 MG/DL (ref 65–99)
GLUCOSE SERPL-MCNC: 87 MG/DL (ref 65–140)
HCT VFR BLD AUTO: 29.7 % (ref 34.8–46.1)
HGB BLD-MCNC: 9.5 G/DL (ref 11.5–15.4)
IMM GRANULOCYTES # BLD AUTO: 0.02 THOUSAND/UL (ref 0–0.2)
IMM GRANULOCYTES NFR BLD AUTO: 1 % (ref 0–2)
LYMPHOCYTES # BLD AUTO: 0.78 THOUSANDS/ÂΜL (ref 0.6–4.47)
LYMPHOCYTES NFR BLD AUTO: 18 % (ref 14–44)
MCH RBC QN AUTO: 31.8 PG (ref 26.8–34.3)
MCHC RBC AUTO-ENTMCNC: 32 G/DL (ref 31.4–37.4)
MCV RBC AUTO: 99 FL (ref 82–98)
MONOCYTES # BLD AUTO: 0.48 THOUSAND/ÂΜL (ref 0.17–1.22)
MONOCYTES NFR BLD AUTO: 11 % (ref 4–12)
NEUTROPHILS # BLD AUTO: 2.98 THOUSANDS/ÂΜL (ref 1.85–7.62)
NEUTS SEG NFR BLD AUTO: 69 % (ref 43–75)
NRBC BLD AUTO-RTO: 0 /100 WBCS
PLATELET # BLD AUTO: 134 THOUSANDS/UL (ref 149–390)
PMV BLD AUTO: 11.2 FL (ref 8.9–12.7)
POTASSIUM SERPL-SCNC: 3.7 MMOL/L (ref 3.5–5.3)
RBC # BLD AUTO: 2.99 MILLION/UL (ref 3.81–5.12)
SODIUM SERPL-SCNC: 144 MMOL/L (ref 135–147)
WBC # BLD AUTO: 4.28 THOUSAND/UL (ref 4.31–10.16)

## 2023-07-24 PROCEDURE — 97530 THERAPEUTIC ACTIVITIES: CPT

## 2023-07-24 PROCEDURE — 92526 ORAL FUNCTION THERAPY: CPT

## 2023-07-24 PROCEDURE — 85025 COMPLETE CBC W/AUTO DIFF WBC: CPT | Performed by: NURSE PRACTITIONER

## 2023-07-24 PROCEDURE — 97110 THERAPEUTIC EXERCISES: CPT

## 2023-07-24 PROCEDURE — 99233 SBSQ HOSP IP/OBS HIGH 50: CPT | Performed by: PHYSICAL MEDICINE & REHABILITATION

## 2023-07-24 PROCEDURE — 80048 BASIC METABOLIC PNL TOTAL CA: CPT | Performed by: NURSE PRACTITIONER

## 2023-07-24 PROCEDURE — 99222 1ST HOSP IP/OBS MODERATE 55: CPT | Performed by: UROLOGY

## 2023-07-24 PROCEDURE — 99232 SBSQ HOSP IP/OBS MODERATE 35: CPT | Performed by: INTERNAL MEDICINE

## 2023-07-24 PROCEDURE — 97535 SELF CARE MNGMENT TRAINING: CPT

## 2023-07-24 RX ORDER — LIDOCAINE 50 MG/G
2 PATCH TOPICAL
Status: DISCONTINUED | OUTPATIENT
Start: 2023-07-24 | End: 2023-07-29 | Stop reason: HOSPADM

## 2023-07-24 RX ORDER — XYLITOL/YERBA SANTA
5 AEROSOL, SPRAY WITH PUMP (ML) MUCOUS MEMBRANE 4 TIMES DAILY
Status: DISCONTINUED | OUTPATIENT
Start: 2023-07-24 | End: 2023-07-29 | Stop reason: HOSPADM

## 2023-07-24 RX ADMIN — Medication 5 SPRAY: at 13:16

## 2023-07-24 RX ADMIN — SIMETHICONE 80 MG: 80 TABLET, CHEWABLE ORAL at 08:33

## 2023-07-24 RX ADMIN — DULOXETINE HYDROCHLORIDE 60 MG: 60 CAPSULE, DELAYED RELEASE ORAL at 17:03

## 2023-07-24 RX ADMIN — SIMETHICONE 80 MG: 80 TABLET, CHEWABLE ORAL at 16:54

## 2023-07-24 RX ADMIN — APIXABAN 5 MG: 5 TABLET, FILM COATED ORAL at 17:03

## 2023-07-24 RX ADMIN — SENNOSIDES 8.6 MG: 8.6 TABLET, FILM COATED ORAL at 08:33

## 2023-07-24 RX ADMIN — DICLOFENAC SODIUM 2 G: 10 GEL TOPICAL at 21:05

## 2023-07-24 RX ADMIN — LEVOFLOXACIN 250 MG: 250 TABLET, FILM COATED ORAL at 13:16

## 2023-07-24 RX ADMIN — BUPROPION HYDROCHLORIDE 100 MG: 100 TABLET, FILM COATED ORAL at 08:34

## 2023-07-24 RX ADMIN — GABAPENTIN 300 MG: 300 CAPSULE ORAL at 08:33

## 2023-07-24 RX ADMIN — Medication 5 SPRAY: at 17:04

## 2023-07-24 RX ADMIN — MORPHINE SULFATE 7.5 MG: 15 TABLET ORAL at 21:02

## 2023-07-24 RX ADMIN — MORPHINE SULFATE 7.5 MG: 15 TABLET ORAL at 16:53

## 2023-07-24 RX ADMIN — SIMETHICONE 80 MG: 80 TABLET, CHEWABLE ORAL at 21:02

## 2023-07-24 RX ADMIN — SIMETHICONE 80 MG: 80 TABLET, CHEWABLE ORAL at 13:16

## 2023-07-24 RX ADMIN — MAGNESIUM OXIDE TAB 400 MG (241.3 MG ELEMENTAL MG) 400 MG: 400 (241.3 MG) TAB at 08:33

## 2023-07-24 RX ADMIN — ACETAMINOPHEN 650 MG: 325 TABLET, FILM COATED ORAL at 17:03

## 2023-07-24 RX ADMIN — SENNOSIDES 8.6 MG: 8.6 TABLET, FILM COATED ORAL at 17:03

## 2023-07-24 RX ADMIN — BUPROPION HYDROCHLORIDE 100 MG: 100 TABLET, FILM COATED ORAL at 17:03

## 2023-07-24 RX ADMIN — LAMOTRIGINE 150 MG: 100 TABLET ORAL at 21:03

## 2023-07-24 RX ADMIN — GABAPENTIN 300 MG: 300 CAPSULE ORAL at 17:03

## 2023-07-24 RX ADMIN — METOPROLOL SUCCINATE 25 MG: 25 TABLET, FILM COATED, EXTENDED RELEASE ORAL at 08:33

## 2023-07-24 RX ADMIN — MAGNESIUM OXIDE TAB 400 MG (241.3 MG ELEMENTAL MG) 400 MG: 400 (241.3 MG) TAB at 17:03

## 2023-07-24 RX ADMIN — Medication 5 SPRAY: at 21:04

## 2023-07-24 RX ADMIN — ACETAMINOPHEN 650 MG: 325 TABLET, FILM COATED ORAL at 13:16

## 2023-07-24 RX ADMIN — DICLOFENAC SODIUM 2 G: 10 GEL TOPICAL at 08:44

## 2023-07-24 RX ADMIN — DICLOFENAC SODIUM 2 G: 10 GEL TOPICAL at 17:04

## 2023-07-24 RX ADMIN — ACETAMINOPHEN 650 MG: 325 TABLET, FILM COATED ORAL at 00:15

## 2023-07-24 RX ADMIN — LIDOCAINE 5% 2 PATCH: 700 PATCH TOPICAL at 21:03

## 2023-07-24 RX ADMIN — HYDROXYCHLOROQUINE SULFATE 200 MG: 200 TABLET ORAL at 08:34

## 2023-07-24 RX ADMIN — DULOXETINE HYDROCHLORIDE 60 MG: 60 CAPSULE, DELAYED RELEASE ORAL at 08:33

## 2023-07-24 RX ADMIN — CINACALCET 30 MG: 30 TABLET, FILM COATED ORAL at 08:33

## 2023-07-24 RX ADMIN — ACETAMINOPHEN 650 MG: 325 TABLET, FILM COATED ORAL at 05:11

## 2023-07-24 RX ADMIN — APIXABAN 5 MG: 5 TABLET, FILM COATED ORAL at 08:33

## 2023-07-24 RX ADMIN — DOCUSATE SODIUM 100 MG: 100 CAPSULE ORAL at 17:04

## 2023-07-24 RX ADMIN — HYDROXYCHLOROQUINE SULFATE 200 MG: 200 TABLET ORAL at 16:55

## 2023-07-24 RX ADMIN — MELATONIN TAB 3 MG 3 MG: 3 TAB at 21:03

## 2023-07-24 RX ADMIN — PANTOPRAZOLE SODIUM 40 MG: 40 TABLET, DELAYED RELEASE ORAL at 05:12

## 2023-07-24 RX ADMIN — DOCUSATE SODIUM 100 MG: 100 CAPSULE ORAL at 08:33

## 2023-07-24 RX ADMIN — DICLOFENAC SODIUM 2 G: 10 GEL TOPICAL at 13:15

## 2023-07-24 NOTE — PROGRESS NOTES
Internal Medicine Progress Note  Patient: El Max  Age/sex: 70 y.o. female  Medical Record #: [id number]      ASSESSMENT/PLAN: (Interval History)  El Max is seen and examined and management for following issues:    Left lateral thigh hematoma   • Stable     ABLA  • S/p 1 unit PRBCs 7/2/23  • CBC 7/18/23 stable     Fall  • While getting OOB 7/19  • Did not hit head but unwitnessed  • CTH/CT cerv spine with no acute changes     Fracture T12  · Age indeterminate  · NS saw 7/22 = no procedure/surgery needed; continue TLSO brace; follow-up in 2 weeks with upright thoracolumbar spine x-rays in brace    Right shoulder pain  · Xray pending    Chronic combined systolic/diastolic heart failure   • Originally diagnosed 2004 when LVEF then was 10-15% and felt to be NICM  • Her LVEF did recover into 50's then fell when she had her CVA in 2/2023 to 30%  • Also has moderate AI/gr 2 DD  • Sees Dr Anam Wilcox a LifeVest in 2/2023 when LVEF was found to be 30% which is most current  • Had been on Coreg in past but HF team switched her to Toprol when she had her stroke 2/2023  • Her soft BPs have limited starting her on ACEI/ARB as OP  • Home:  Toprol XL 25mg BID/Aldactone 25mg qd  • Here:  Toprol XL 25 mg QD  • D/C'd Aldactone 7/16  • euvolemic     Hypercalcemia  • Continue Cinacalcet here as was taking at home  • Calcium 8.3 on 7/24/23     HS hypoxia   • using 4L NC HS at home  • using O2 at night here     Nightmares  • placed on Minipress when here with us after her CVA  • D/w Dr Ruddy Wilson, d/c'd since had been too low to get  • Minipress dc'd 2/2 hypotension     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  • Has residual left sided weakness  • Had recrudescence of CVA 4/2023 with worsening left facial droop  • Continue Eliquis     Dysphagia  • Had admission for aspiration PNA at [facility] 5/2023 tx'd with Levaquin and Flagyl  • Continue pureed diet with NTL as at home  • ST following     Weight loss  • Continue Ensure HP pudding TID and Magic cups TID  • She was on no supplements at home so likely will gain some wt that she is eating/drinking supps here  • adamantly refuses PEG tube  • Nutrition following     Bipolar depression  • Home:  Wellbutrin 100mg BID/Cymbalta 60mg BID/Lamictal 150mg qhs  • Here:  Wellbutrin 100mg BID/Cymbalta 60mg BID/Lamictal 150mg qhs     Rheumatoid arthritis  • Continue Plaquenil 200mg BID as at home     Chronic pain/chronic opioid use  • 2/2 RA and lumbar spondylosis  • Pain management per PMR     Hx incidental right superior hypophyseal artery aneurysm  • Follows with NS Dr Douglass Aus     CRE urine 3/2023  • Got Cipro then to [facility] for tx at the time  • Maintain contact isolation     Dye allergy   • Was dye prepped on 7/4/23 with Benadryl 50mg IV x 1 w/o issues     Incidental findings  • Pancolitis with increasing inflammatory free fluid in pelvis  • Calcified granuloma RLL     Hypokalemia  • Got Kdur 40 meq x 2 on 7/8/23  • Stable at 3.7.     Enterococcus UTI  • S/p [place] Dr Depadua = sensitive  • UA for c/o dysuria again 7/20 = culture again with >100,000 Enterococcus  • Pt has multiple abx allergies. • Started Levaquin 250 mg q24hrs on 7/22/23 which is susceptible. • Uro to see for freq UTIs      Discharge date:  TBD     The above assessment and plan was reviewed and updated as determined by my evaluation of the patient on 7/24/2023.     Labs:   Results from last 7 days   Lab Units 07/24/23  0510 07/22/23  0900   WBC Thousand/uL 4.28* 7.76   HEMOGLOBIN g/dL 9.5* 9.6*   HEMATOCRIT % 29.7* 30.4*   PLATELETS Thousands/uL 134* 157     Results from last 7 days   Lab Units 07/24/23  0510 07/22/23  0916   SODIUM mmol/L 144 142   POTASSIUM mmol/L 3.7 4.2   CHLORIDE mmol/L 111* 110*   CO2 mmol/L 28 30   BUN mg/dL 13 16   CREATININE mg/dL 0.76 0.92   CALCIUM mg/dL 8.3 8.7                   Review of Scheduled Meds:  Current Facility-Administered Medications   Medication Dose Route Frequency Provider Last Rate   • acetaminophen  650 mg Oral Q6H [address] Winn Severe, MD     • albuterol  2 puff Inhalation Q6H PRN Winn Severe, MD     • apixaban  5 mg Oral BID Winn Severe, MD     • buPROPion  100 mg Oral BID Winn Severe, MD     • cinacalcet  30 mg Oral Daily With Breakfast Winn Severe, MD     • Diclofenac Sodium  2 g Topical 4x Daily LAURA Rutledge     • docusate sodium  100 mg Oral BID Lew Romeo MD     • DULoxetine  60 mg Oral BID Winn Severe, MD     • gabapentin  300 mg Oral BID Lew Romeo MD     • hydroxychloroquine  200 mg Oral BID With Meals Winn Severe, MD     • lamoTRIgine  150 mg Oral HS Winn Severe, MD     • levofloxacin  250 mg Oral Q24H Ramonita Julian PA-C     • lidocaine  1 patch Topical HS LAURA Rutledge     • magnesium Oxide  400 mg Oral BID Winn Severe, MD     • melatonin  3 mg Oral HS Winn Severe, MD     • metoprolol succinate  25 mg Oral Daily LAURA Del Rosario     • morphine  7.5 mg Oral TID PRN Lew Romeo MD     • ondansetron  4 mg Oral Q6H PRN Winn Severe, MD     • pantoprazole  40 mg Oral Early Morning Winn Severe, MD     • polyethylene glycol  17 g Oral Daily PRN LAURA Rutledge     • polyethylene glycol  17 g Oral Once LAURA Rutledge     • saliva substitute  5 spray Mouth/Throat 4x Daily PRN LAURA Mireles     • senna  1 tablet Oral BID Lew Romeo MD     • simethicone  80 mg Oral 4x Daily (with meals and at bedtime) LAURA Mireles         Subjective/ HPI: Patient seen and examined. Patients overnight issues or events were reviewed with nursing or staff during rounds or morning huddle session. New or overnight issues include the following:    No new or overnight issues. Offers no complaints    ROS:   A 10 point ROS was performed; negative except as noted above.        Imaging:     XR spine thoracic 3 vw   Final Result by Bridgett De Guzman MD (07/21 1706)      1. Mild anterior wedging of T12, not present on the recent prior CT. A fracture line is not visualized and this is age-indeterminate. 2.  Otherwise stable chronic compression fractures at T9, T11, and L1 with kyphoplasty at L1.      3.  Kyphosis at the thoracolumbar junction. The study was marked in [facility] for immediate notification. Workstation performed: [id number]         XR spine lumbar 2 or 3 views injury   Final Result by Marti Mitchell MD (07/21 1708)      No acute fracture. Stable chronic compression deformities at L1 and L4. Workstation performed: [id number]         CT head wo contrast   Final Result by Kal Tejada MD (07/19 2025)      No interval change. No acute intracranial hemorrhage or depressed calvarial fracture identified. Chronic right MCA distribution infarct. Workstation performed: [id number]         CT spine cervical wo contrast   Final Result by Kal Tejada MD (07/19 2029)      No acute fracture or evidence for traumatic malalignment. No interval change since prior exam.                  Workstation performed: [id number]         XR hip/pelv 2-3 vws left if performed   Final Result by Cele Barnett MD (07/19 1856)      No acute osseous abnormality.             Workstation performed: [id number]         XR spine thoracolumbar 2 vw    (Results Pending)   XR shoulder 2+ vw right    (Results Pending)       *Labs /Radiology studies reviewed  *Medications reviewed and reconciled as needed  *Please refer to order section for additional ordered labs studies  *Case discussed with primary attending during morning huddle case rounds    Physical Examination:  Vitals:   Vitals:    07/23/23 2015 07/24/23 0514 07/24/23 0600 07/24/23 0829   BP: 98/62 108/70  108/58   BP Location: Right arm Right arm  Right arm   Pulse: 94 95     Resp: 14 17     Temp: 97.5 °F (36.4 °C) 97.6 °F (36.4 °C)     TempSrc: Axillary Axillary     SpO2: 94% 95%     Weight:   48.1 kg (106 lb 0.7 oz)    Height:           General Appearance: no distress, conversive  HEENT:  External ear normal.  Nose normal w/o drainage. Mucous membranes are moist. Oropharynx is clear. Conjunctiva clear w/o icterus or redness. Neck:  Supple, normal ROM  Lungs: BBS without crackles/wheeze/rhonchi; respirations unlabored with normal inspiratory/expiratory effort. No retractions noted. On RA  CV: regular rate and rhythm; no rubs/murmurs/gallops, PMI normal   ABD: Abdomen is soft. Bowel sounds all quadrants. Nontender with no distention. EXT: no edema  Skin: normal turgor, normal texture, no rashes  Psych: affect normal, mood normal  Neuro: AAO     The above physical exam was reviewed and updated as determined by my evaluation of the patient on 7/24/2023. Invasive Devices     None                    VTE Pharmacologic Prophylaxis: Eliquis  Code Status: Level 1 - Full Code  Current Length of Stay: 17 day(s)      Total time spent:  30 minutes with more than 50% spent counseling/coordinating care. Counseling includes discussion with patient re: progress  and discussion with patient of his/her current medical state/information. Coordination of patient's care was performed in conjunction with primary service. Time invested included review of patient's labs, vitals, and management of their comorbidities with continued monitoring. In addition, this patient was discussed with medical team including physician and advanced extenders. The care of the patient was extensively discussed and appropriate treatment plan was formulated unique for this patient. Medical decision making for the day was made by supervising physician unless otherwise noted in their attestation statement. ** Please Note:  voice to text software may have been used in the creation of this document.  Although proof errors in transcription or interpretation are a potential of such software**

## 2023-07-24 NOTE — PROGRESS NOTES
07/24/23 1030   Pain Assessment   Pain Assessment Tool 0-10   Pain Score No Pain   Restrictions/Precautions   Precautions 1:1;Aspiration;Bed/chair alarms;Cognitive; Fall Risk;Impulsive;Contact/isolation;Pain;Spinal precautions;Supervision on toilet/commode   Braces or Orthoses TLSO   Swallow Assessment   Swallow Treatment Assessment Focus of session today was toward family training, which was held w/ pt, pt's dtr Francisca Marrufo and pt's sonJai in regard to diet recommendations and swallow strategies. Of note, pt's dtr recalled current SLP from prior admission on the [facility] in March. Overall session was a generalized review of current diet recommendations for puree diet w/ NTL. Dtr did report that gel thickener has been purchased in addition to boost puddings which pt does like currently. SLP did state that while trials of level 2 items had been initiated intermittently throughout pt's current stay, still preference and likely SAFEST texture remains to be pureed food items. SLP did provide additional suggestions for how pt is currently liking pureed fruits which can be a purchase given baby food IF wanting to make things easier vs pureeing in a . SLP did educate pt and children about use of  that if the consistency seems to runny or thin, easily can add powder thickener, potato flakes, cornstarch to these food items as a means for thickening them. All were receptive to this education. Of note, there was a folder given all recommendations for diet, thickener purchases, etc which was on pt's table which SLP spoke to sonJai about taking that education and information home w/ him today in preparation for pt's discharge. Other recommendations reviewed was full supervision during meals still, as well as completing swallow strategies which have been established post stroke admission.  Also SLP provided education about making smaller more frequent meals or snack throughout the day to encourage increased PO intake,  Which pt and children were receptive. Additionally educated that pills should be taken whole or larger items cut in half (if able) and using yogurt to mask the taste. Pt's son did ask about water, jello and ice creams between meals, which at this point in time, SLP discouraging that prior recommendation since pt has prior aspiration pneumonia and does remain at high risk currently, stating safest least restrictive remains puree/ NTL by cup only. All parties verbalized understanding and agreement w/ recommendations at this time. No further questions presented at this time and continue to f/u w/ pt for ongoing dysphagia tx sessions to monitor current tolerance and carryover of swallow strategies in attempts to decrease overall risk of aspiration at this time. SLP Therapy Minutes   SLP Time In 56   SLP Time Out 1100   SLP Total Time (minutes) 30   SLP Mode of treatment - Individual (minutes) 30   SLP Mode of treatment - Concurrent (minutes) 0   SLP Mode of treatment - Group (minutes) 0   SLP Mode of treatment - Co-treat (minutes) 0   SLP Mode of Treatment - Total time(minutes) 30 minutes   SLP Cumulative Minutes 590   Therapy Time missed   Time missed?  No

## 2023-07-24 NOTE — CONSULTS
[address] NOTE   Admission Date: 7/7/2023    Patient Identifiers: Uriel Hooper (MRN: [id number])  Service Requesting Consultation: Awa Chávez MD  Service Providing Consultation:  Urology, [facility], [facility]  Inpatient consult to Urology  Consult performed by: Jarod Horton PA-C  Consult ordered by: Raza Lopez MD        Date of Service: 7/24/2023    Reason for Consultation: Recurrent urinary tract infection    History of Present Illness:     Uriel Hooper is a 70 y.o. female seen previously by urogynecology at [facility]. She has a history of recurrent urinary tract infection. She had a pubovaginal sling placement in the past.  She was on Detrol LA 5 mg for her mixed type incontinence. Multiple medical problems including bipolar depression chronic pain with opioid use. History of Enterococcus UTI. Last culture was on July 20. Creatinine 0.76. WBC 4.28.  H&H 9.5 and 29.7. CT chest abdomen and pelvis from June 30 showed a large hematoma in the left thigh. Kidneys and bladder were unremarkable.     Past Medical, Past Surgical History:     Past Medical History:   Diagnosis Date   • Anxiety    • Asthma    • Bipolar depression ([address])    • Chronic narcotic dependence ([address])    • Chronic pain    • Depression    • Enterovirus heart infection    • SHAR (obstructive sleep apnea)    • Osteoarthritis    • Peripheral neuropathy    • Plantar fasciitis of right foot    • Senile osteoporosis    • Seronegative arthropathy of multiple sites ([address])    • Undifferentiated connective tissue disease ([address])    :    Past Surgical History:   Procedure Laterality Date   • BREAST IMPLANT     • FIXATION KYPHOPLASTY LUMBAR SPINE     • HYSTERECTOMY     • INCONTINENCE SURGERY N/A    • IR STROKE ALERT  2/12/2023   • NASAL SEPTOPLASTY W/ TURBINOPLASTY N/A    • RECTAL PROLAPSE REPAIR N/A    • REPAIR RECTOCELE     • RHINOPLASTY N/A :    Medications, Allergies:     Current Facility-Administered Medications:   •  acetaminophen (TYLENOL) tablet 650 mg, 650 mg, Oral, Q6H [facility], Lawanda Lopez MD, 650 mg at 07/24/23 0511  •  albuterol (PROVENTIL HFA,VENTOLIN HFA) inhaler 2 puff, 2 puff, Inhalation, Q6H PRN, Lawanda Lopez MD, 2 puff at 07/21/23 2208  •  apixaban (ELIQUIS) tablet 5 mg, 5 mg, Oral, BID, Lawanda Lopez MD, 5 mg at 07/24/23 6555  •  buPROPion [facility]) tablet 100 mg, 100 mg, Oral, BID, Lawanda Lopez MD, 100 mg at 07/24/23 1043  •  cinacalcet (SENSIPAR) tablet 30 mg, 30 mg, Oral, Daily With Breakfast, Lawanda Lopez MD, 30 mg at 07/24/23 2055  •  Diclofenac Sodium (VOLTAREN) 1 % topical gel 2 g, 2 g, Topical, 4x Daily, LAURA Rutledge, 2 g at 07/24/23 0844  •  docusate sodium (COLACE) capsule 100 mg, 100 mg, Oral, BID, Marilyn Gonzalez MD, 100 mg at 07/24/23 7010  •  DULoxetine (CYMBALTA) delayed release capsule 60 mg, 60 mg, Oral, BID, Lawanda Lopez MD, 60 mg at 07/24/23 4553  •  gabapentin (NEURONTIN) capsule 300 mg, 300 mg, Oral, BID, Marilyn Gonzalez MD, 300 mg at 07/24/23 2617  •  hydroxychloroquine (PLAQUENIL) tablet 200 mg, 200 mg, Oral, BID With Meals, Lawanda Lopez MD, 200 mg at 07/24/23 6655  •  lamoTRIgine (LaMICtal) tablet 150 mg, 150 mg, Oral, HS, Lawanda Lopez MD, 150 mg at 07/23/23 2102  •  levofloxacin (LEVAQUIN) tablet 250 mg, 250 mg, Oral, Q24H, Ramonita Julian PA-C, 250 mg at 07/23/23 1238  •  lidocaine (LIDODERM) 5 % patch 1 patch, 1 patch, Topical, HS, LAURA Rutledge, 1 patch at 07/23/23 2102  •  magnesium Oxide (MAG-OX) tablet 400 mg, 400 mg, Oral, BID, Lawanda Lopez MD, 400 mg at 07/24/23 4485  •  melatonin tablet 3 mg, 3 mg, Oral, HS, Lawanda Lopez MD, 3 mg at 07/23/23 2103  •  metoprolol succinate (TOPROL-XL) 24 hr tablet 25 mg, 25 mg, Oral, Daily, LAURA Cunningham, 25 mg at 07/24/23 2510  •  morphine (MSIR) IR tablet 7.5 mg, 7.5 mg, Oral, TID PRN, Marilyn Gonzalez, MD, 7.5 mg at 07/23/23 1234  •  ondansetron (ZOFRAN-ODT) dispersible tablet 4 mg, 4 mg, Oral, Q6H PRN, Huy Huerta MD, 4 mg at 07/23/23 0504  •  pantoprazole (PROTONIX) EC tablet 40 mg, 40 mg, Oral, Early Morning, uHy Huerta MD, 40 mg at 07/24/23 8122  •  polyethylene glycol (MIRALAX) packet 17 g, 17 g, Oral, Daily PRN, LAURA Rutledge, 17 g at 07/17/23 0934  •  polyethylene glycol (MIRALAX) packet 17 g, 17 g, Oral, Once, LAURA Rutledge  •  saliva substitute (MOUTH KOTE) mucosal solution 5 spray, 5 spray, Mouth/Throat, 4x Daily PRN, LAURA Lei, 5 spray at 07/21/23 1642  •  senna (SENOKOT) tablet 8.6 mg, 1 tablet, Oral, BID, Boris Velasquez MD, 8.6 mg at 07/24/23 8365  •  simethicone (MYLICON) chewable tablet 80 mg, 80 mg, Oral, 4x Daily (with meals and at bedtime), LAURA Roy, 80 mg at 07/24/23 6212    Allergies: Allergies   Allergen Reactions   • Cephalosporins Other (See Comments)     rash   • Iodine - Food Allergy Anaphylaxis     Anaphylaxis     • Methocarbamol Hives   • Sulfa Antibiotics Hives   • Bactrim [Sulfamethoxazole-Trimethoprim] Hives   • Erythromycin Hives   • Penicillins    • Shrimp Extract Allergy Skin Test - Food Allergy Other (See Comments)   • Trimethoprim Hives   :    Social and Family History:   Social History:   Social History     Tobacco Use   • Smoking status: Former   • Smokeless tobacco: Never   Vaping Use   • Vaping Use: Never used   Substance Use Topics   • Alcohol use: Not Currently     Comment: quit   • Drug use: Not Currently     Types: Marijuana     Comment: has medical card   .     Social History     Tobacco Use   Smoking Status Former   Smokeless Tobacco Never       Family History:  Family History   Problem Relation Age of Onset   • No Known Problems Mother    • No Known Problems Father    • Arthritis Family    • Diabetes Family    • Stroke Family    :     Review of Systems:     General: Fever, chills, or night sweats: negative  Cardiac: Negative for chest pain. Pulmonary: Negative for shortness of breath. Gastrointestinal: Abdominal pain negative. Nausea, vomiting, or diarrhea negative,  Genitourinary: See HPI above. Patient does not have hematuria. All other systems queried were negative. Physical Exam:   General: Patient is pleasant and in NAD. Awake and alert  /58 (BP Location: Right arm)   Pulse 95   Temp 97.6 °F (36.4 °C) (Axillary)   Resp 17   Ht 5' 3" (1.6 m)   Wt 48.1 kg (106 lb 0.7 oz)   SpO2 95%   BMI 18.78 kg/m²   HEENT:  Conjunctiva are clear  Constitutional:  pleasant and cooperative     no apparent distress  Cardiac: Peripheral edema: negative  Pulmonary: Non-labored breathing  Abdomen: Soft, non-tender, non-distended. No surgical scars. No masses, tenderness, hernias noted. Genitourinary: Negative CVA tenderness, negative suprapubic tenderness. Extremities:  Moves all extremities  Neurological:CNII-XII intact. No numbness or tingling. Essentially non focal neurologic exam  Psychiatric:mood affect and behavior normal      Labs:     Lab Results   Component Value Date    HGB 9.5 (L) 07/24/2023    HCT 29.7 (L) 07/24/2023    WBC 4.28 (L) 07/24/2023     (L) 07/24/2023   ]    Lab Results   Component Value Date    K 3.7 07/24/2023     (H) 07/24/2023    CO2 28 07/24/2023    BUN 13 07/24/2023    CREATININE 0.76 07/24/2023    CALCIUM 8.3 07/24/2023    GLUCOSE 87 11/17/2016   ]    Imaging:   I personally reviewed the images and report of the following studies, and reviewed them with the patient:  CT CHEST, ABDOMEN AND PELVIS WITH IV CONTRAST   URINARY BLADDER: Unremarkable   KIDNEYS/URETERS: Unremarkable. No hydronephrosis   IMPRESSION:     Large hematoma in the lateral left thigh measuring up to 9.6 cm with focus of active contrast extravasation. No acute intrathoracic or intra-abdominal injury      ASSESSMENT:     #1. Recurrent urinary tract infection  #2.   Mixed type incontinence  #3. History of pubovaginal sling placement  #4. Left thigh hematoma  #5. Multiple medical problems including depression chronic pain and opioid use      PLAN:   -She may follow-up with urogynecology at [facility] who she previously saw-  -recommend adequate bowel program change pads frequently  -Consider prophylactic antibiotics      Thank you for allowing me to participate in this patients’ care. Please do not hesitate to call with any additional questions.   Ambrosio Davila PA-C

## 2023-07-24 NOTE — PLAN OF CARE
Problem: SAFETY ADULT  Goal: Patient will remain free of falls  Description: INTERVENTIONS:  - Educate patient/family on patient safety including physical limitations  - Instruct patient to call for assistance with activity   - Consult OT/PT to assist with strengthening/mobility   - Keep Call bell within reach  - Keep bed low and locked with side rails adjusted as appropriate  - Keep care items and personal belongings within reach  - Initiate and maintain comfort rounds  - Make Fall Risk Sign visible to staff  - Offer Toileting every 2 Hours, in advance of need  - Initiate/Maintain alarm  - Obtain necessary fall risk management equipment:   - Apply yellow socks and bracelet for high fall risk patients  - Consider moving patient to room near nurses station  Outcome: Progressing Patient/Caregiver provided printed discharge information.

## 2023-07-24 NOTE — PROGRESS NOTES
OT Daily Treatment Note       07/24/23 1000   Pain Assessment   Pain Assessment Tool 0-10   Pain Score 3   Pain Location/Orientation Orientation: Mid;Location: Back   Hospital Pain Intervention(s) Repositioned   Restrictions/Precautions   Precautions 1:1;Aspiration;Bed/chair alarms;Cognitive; Fall Risk;Impulsive;Pain;Spinal precautions;Supervision on toilet/commode;Visual deficit   Braces or Orthoses TLSO   Lifestyle   Autonomy "my babies will do anything for me"   Upper Body Dressing   Comment see below for details   Sit to Stand   Type of Assistance Needed Physical assistance   Physical Assistance Level 25% or less   Comment min A/CG with RW and TLSO on   Sit to Stand CARE Score 3   Bed-Chair Transfer   Type of Assistance Needed Physical assistance   Physical Assistance Level 25% or less   Comment min A/CG SPT with RW and TLSO on   Chair/Bed-to-Chair Transfer CARE Score 3   Cognition   Overall Cognitive Status Impaired   Arousal/Participation Alert; Cooperative   Attention Attends with cues to redirect   Orientation Level Oriented X4   Memory Decreased short term memory   Following Commands Follows one step commands with increased time or repetition   Activity Tolerance   Activity Tolerance Patient tolerated treatment well   Assessment   Treatment Assessment Brief OT FT completed with pt, pts dtr Antonina Giordano and son Mona Bowers to review pts CLOF, DC recommendations and spinal/brace edu. Dr. Michell Walker present at start of session who informed pt/family that DC most likely will be next week pending pts progress this week. MD also notified OT of possible R shoulder impingement, requesting ROM as tolerated, limiting overhead and no strengthening exercises. OT then proceeded to educate pt and famiyl re: pts CLOF which has regressed since last week s/p fall. Family reports they were assisting the pt with ADL/IADLS PTA and they are comfortable with her [address] home still requiring assistance.  Son, Mona Bowers, reported he was assisting pt to bathroom as there is one step down - OT verbalized concerns with stepping down backwards to toilet and may require BSC to be used as a stand alone. Family reports their method of doing this transfer was ok because the son assisted with her descent backwards - OT to assess further with PT before giving final recommendation. OT then edu on spinal precautions, reviewing educational handouts and began  TLSO donning/doffing training. Completed x3 with no questions or concerns from the family. Edu on wearing schedule and provided instruction handout on how to properly don/doff. Family had no questions or concerns at this time stating that even with her regression, they feel she is close to baseline and are comfortable with her CLOF. Edu that pending a finalized DC date later this week, another FT can be setup as needed. Family in agreement. Family did question switching out pts current WC > transport chair. OT deferred to [address] for information and will f/u. Pt would benefit from cont skilled OT services to inc independence and safety with ADL completion in prep for DC home. OT Family training done with: Sergio Molina and Sebastián Brian   Prognosis Fair   Problem List Decreased strength;Decreased range of motion;Decreased endurance; Impaired balance;Decreased mobility; Decreased cognition; Impaired judgement;Decreased safety awareness;Orthopedic restrictions;Pain   Barriers to Discharge Inaccessible home environment;Decreased caregiver support   Plan   Treatment/Interventions ADL retraining;Functional transfer training; Therapeutic exercise; Endurance training;Patient/family training; Compensatory technique education   Progress Improving as expected   OT Therapy Minutes   OT Time In 1000   OT Time Out 1030   OT Total Time (minutes) 30   OT Mode of treatment - Individual (minutes) 30   OT Mode of treatment - Concurrent (minutes) 0   OT Mode of treatment - Group (minutes) 0   OT Mode of treatment - Co-treat (minutes) 0   OT Mode of Treatment - Total time(minutes) 30 minutes   OT Cumulative Minutes 1328   Therapy Time missed   Time missed?  No

## 2023-07-24 NOTE — PROGRESS NOTES
07/24/23 1100   Pain Assessment   Pain Assessment Tool 0-10   Pain Score 6   Pain Location/Orientation Location: Back   Hospital Pain Intervention(s) Repositioned; Rest   Restrictions/Precautions   Precautions 1:1;Aspiration;Bed/chair alarms;Cognitive; Fall Risk;Impulsive;Pain;Spinal precautions;Supervision on toilet/commode;Visual deficit   Weight Bearing Restrictions No   ROM Restrictions Yes  (spinal precautions)   Braces or Orthoses TLSO   General   Change In Medical/Functional Status Patient now has spinal precautions and TLSO brace 2/2 to T12 fracture   Cognition   Overall Cognitive Status Impaired   Arousal/Participation Alert; Cooperative   Attention Attends with cues to redirect   Orientation Level Oriented X4   Memory Decreased short term memory   Following Commands Follows one step commands with increased time or repetition   Subjective   Subjective Patient reports her brace is new since last time this PT saw her   Sit to Lying   Type of Assistance Needed Physical assistance   Physical Assistance Level 26%-50%   Comment Min/ModA for management of trunk and LEs, VC to ensure patient adhereing to spinal precautions   Sit to Lying CARE Score 3   Lying to Sitting on Side of Bed   Type of Assistance Needed Physical assistance   Physical Assistance Level 26%-50%   Comment Min/ModA for management of trunk and LEs, VC to ensure patient adhereing to spinal precautions   Lying to Sitting on Side of Bed CARE Score 3   Sit to Stand   Type of Assistance Needed Physical assistance   Physical Assistance Level 25% or less   Comment Fei/CGA with RW, VCs for proper hand placement and for R foot placement.  She has tendency to move RLE too far under her   Sit to Stand CARE Score 3   Bed-Chair Transfer   Type of Assistance Needed Physical assistance   Physical Assistance Level 25% or less   Comment Fei/CGa with RW   Chair/Bed-to-Chair Transfer CARE Score 3   Walk 10 Feet   Type of Assistance Needed Incidental touching Comment CGA with RW   Walk 10 Feet CARE Score 4   Walk 50 Feet with Two Turns   Type of Assistance Needed Incidental touching   Comment CGA with RW   Walk 50 Feet with Two Turns CARE Score 4   Walk 150 Feet   Comment ambulation distance limited by pain   Reason if not Attempted Safety concerns   Walk 150 Feet CARE Score 88   Ambulation   Primary Mode of Locomotion Prior to Admission Walk   Distance Walked (feet) 65 ft   Assist Device Roller Walker   Gait Pattern Slow Amy; Inconsistant Amy;Narrow AYLA;Step to; Step through; Improper weight shift; Forward Flexion   Limitations Noted In Balance; Endurance; Heel Strike;Posture; Safety;Speed;Strength;Swing   Provided Assistance with: Balance   Walk Assist Level Contact Guard   Findings VC to stay in RW frame, especially when turning   Does the patient walk? 2. Yes   Therapeutic Interventions   Other Daughter reports patient has WC at home, but pt only used it for community distances with family pushing her and not in the home. Daughter reports that the [facility] is too heavy for her to easily handle and that a transport chair would be easier for her to use with her mother. Patient does not plan on using WC in her home upon discharge from rehab. Discussed the pros and cons of WC vs transport chair. Family would like to pursue transport chair as it best fits the patient's needs. Assessment   Treatment Assessment Patient participated in 30 minute skilled physical therapy session. Daughter, Sergio Molina, present for family training. Initial plan was for potential DC home on Tuesday, 7/25, but now no official discharge date. Patient has new T12 fracture and TLSO brace following fall last week. Session focusing on providing education to both daughter and patient on spinal precautions with bed mobility, transfers, and ambulation. Demonstrated to daughter how to best assist her mother with bed mobility and transfers with the spinal precautions.  For ambulation, instructed daughter we are currently providing CGA and that ambulation distance is limited secondary to back pain. Please see flowsheet for discussion regarding WC vs transport chair. Also discussed benefits of palliative care with pt and daughter. Pt and daughter interested in learning more. Patient would benefit from continued skilled physical therapy services to work on transfers, gait training, step/curb management with RW, and LE strengthening   Family/Caregiver Present yes   PT Family training done with: DaughterLois   Barriers to Discharge Inaccessible home environment;Decreased caregiver support   PT Barriers   Physical Impairment Decreased strength;Decreased range of motion;Decreased endurance;Decreased mobility; Impaired balance;Decreased coordination;Decreased cognition; Impaired judgement   Functional Limitation Car transfers; Ramp negotiation;Stair negotiation;Standing;Transfers; Walking   Plan   Treatment/Interventions Functional transfer training;LE strengthening/ROM; Therapeutic exercise;Elevations; Endurance training;Patient/family training;Equipment eval/education; Bed mobility;Gait training   Progress Improving as expected   Recommendation   PT Discharge Recommendation Home with home health rehabilitation   Equipment Recommended Walker   PT Therapy Minutes   PT Time In 1100   PT Time Out 1130   PT Total Time (minutes) 30   PT Mode of treatment - Individual (minutes) 30   PT Mode of treatment - Concurrent (minutes) 0   PT Mode of treatment - Group (minutes) 0   PT Mode of treatment - Co-treat (minutes) 0   PT Mode of Treatment - Total time(minutes) 30 minutes   PT Cumulative Minutes 963   Therapy Time missed   Time missed?  No     Lucas Leal, PT, DPT

## 2023-07-24 NOTE — PROGRESS NOTES
Physical Medicine and Rehabilitation Progress Note  Lawrence Ortega 70 y.o. female MRN: [id number]  Unit/Bed#: -01 Encounter: [id number]    To Review: Lawrence Ortega is a 70 y.o. female with medical history of HTN, CVA in 2/2023 on eliquis (chronic dysphagia, L sided weakness, cognitive impairment), hypercalcemia,  CHF, GERD, SHAR, gout, bipolar disorder, RA on plaquenil, chronic opioid dependence on morphine for chronic back/joint pain, who presented to the [address] [place] [address] on 6/30 after a fall, unable to get up and disoriented. She thinks she tripped and fell down her porch steps, landing on he L thigh. Found to have a L lateral thigh hematoma with evidence of extravasation on imaging. Binder placed over hips, and eliquis was held but not reversed given her recent stroke. Speech consulted and noted oropharyngeal swallow likely at baseline and recommended Level 1/NTL. Recommended for VBS. Course c/b ABLA on 7/2 Hgb dropped to 6.8 and she was given 1 unit pRBC. Geriatrics was consulted, noted at baseline she is oriented but forgetful, with worsening issues since her stroke. Mini-Cog was 9/15 in 2021. They recommended checking B12. They noted she has not been on lorazepam since January, and also recommended weaning down off gabapentin if possible. On 7/4 patient had an unwitnessed fall while trying to reach for her bag on the window sill. No head strike, but landed on her L hip. GCS 15, Neuro exam was baseline. CTA ordered to look for active bleeding, which showed decreased size of hematoma with no active extravasation. Hgb did drop after her transfusion, but seems to have stablized today at 8.5. That being said, her HR was elevated today, and it appears a troponin has been ordered by the trauma team as well as a CXR. CXR showed hazy RLL opacity, likely 2/2 atelectasis, and trops trended down. Metoprolol was increased with improvement. She was admitted to the [facility] on 7/7. Chief Complaint: Back pain, L outer hip pain, and mild R shoulder discomfort. Interval History/Subjective:  No acute events overnight. Still gets confused overnight, but doing well with 1:1 in place. She denies any new CP, SOB, fevers, chills, N/V, abdominal pain. Her use of morphine at her home dose continues to be appropriate. PVRs are low, she remains continent with timed voids and her last BM 7/24. Her pain in her back remains midline and in her back without radiation. She has mild L outer hip tenderness to palpation at times. It is manageable. Her R shoulder hurts on the lateral aspect under her acromion with certain reaching movements. ROS:  A 10 point review of systems was negative except for what is noted in the HPI. Today's Changes:  1. Repeat UTI after tx with Macrobid. Restarted on Levaquin 250mg Q24hrs on 7/22. Would treat for at least 7 days. Monitor mental status and for ligamentous issues. - Of note she has a history of LUT symptoms, recurrent UTI. She has had a pubovaginal sling done in the past for incontinence. She previously followed with [facility] but has not seen them since 2020. I would recommend follow-up with them   - Urology consult ordered by weekend team. Will follow-up. 2. 1:1 to continue at this time  3. L hip seems to be greater trochanteric, possibly bursa pain, but it is mild, so will continue pain meds, modalities, and can try topicals. Hold on injection at this time  4. R shoulder examines like impingement syndrome. Reviewed with therapies who will adjust their approach. Can try topicals. If no improvement can consider steroid injection. - XR ordered over the weekend. Final read pending - but do not see acute injuries. Total time spent:  50 minutes, with more than 50% spent counseling/coordinating care.  Counseling includes discussion with patient re: progress in therapies, functional issues observed by therapy staff, and discussion with patient his/her current medical state/wellbeing. Coordination of patient's care was performed in conjunction with Internal Medicine service to monitor patient's labs, vitals, and management of their comorbidities. Assessment/Plan:    * Leg hematoma, left, initial encounter  Assessment & Plan  2/2 fall    6/30 CT CAP:  Large hematoma in the lateral left thigh measuring up to 9.6 cm with focus of active contrast extravasation. No acute intrathoracic or intra-abdominal injury. 7/4 CTA Pelvis:  1. Decreasing size of left lateral thigh hematoma without evidence for active extravasation. 2.  No CT findings in the visualized pelvis and thighs to explain falling hematocrit. 3.  Pancolitis with increasing inflammatory free fluid in the pelvis. No free air, pneumatosis, or encapsulated collections demonstrated. Non-operatively managed. Did require transfusion x1  Pain management as below  Monitor Hgb. PT/OT 3-5 hours/day, 5-7 days/week  Outpatient f/u with General Surgery      Cervical dystonia  Assessment & Plan  Recommend outpatient f/u with Dr. Sania Guzmán to evaluate for botulinum toxin injections  Appears to have a retrocollis. Sundowning  Assessment & Plan  Does wake up confused at night, but is easily redirectable  - More confusion, than agitation  - Wandered all the way down to the cafeteria on the 1st floor on 7/9  - No injuries  - Brought back to her room  - Found to have a UTI on admission and treated with Macrobid. - Initially was able to wean down from Virtual 1:1 to Frequent Checks. - Unfortunately on 7/19 had another fall with some confusion   - Found to have another UTI and started on enterococcus   - Now on 1:1 indefinitely. - Floor mats. - Delirium precautions    Night terrors  Assessment & Plan  Home: Prazosin  Here: none - held due to BP  Continue melatonin - so far tolerating.   Outpatient f/u with PCP    Hypomagnesemia  Assessment & Plan  Home: Mag oxide 800mg BID  Here: Mag oxide 400mg BID  7/9 Mag 1.9   Goals > 2. Sinus tachycardia  Assessment & Plan  Home: Toprol XL 25mg BID  Here: Toprol XL 25mg daily  On 7/6 with HR in 115-120. EKG showed sinus tach. Trops trended and negative. - Associated with transient/mild SOB   - She was only being given Toprol daily in the hospital   - Increased to BID with improvement. Had to be decreased eventually as was not meeting parameters. Lytes with K > 4, Mag > 2  Monitor and adjust regimen as appropriate  IM consulted to assist with management  Outpatient f/u with Dr. Ismeal Cardenas    Dysphagia  Assessment & Plan  2/2 previous CVA earlier this year. Noted to have impaired oropharyngeal swallow  Was on free water protocol at home - held initially due to thrush - which has been treated and is resolved. On Level 1/NTL  - Inpatient SLP recommended VBS  - Consulting SLP here - they did not recommend VBS at this time. - Aspiration precautions  - Strict oral hygiene prior to trials  - esophagogram scheduled for 7/19, unable to complete d/t Aspiration risk    Urinary tract infection without hematuria  Assessment & Plan  Present on admission  With incontinence and confusion  Grew 100k CFU Enterococcus    - Started on Macrobid based on antibiogram on 7/9   - sensitivity confirms susceptibility   - improved symptoms of UTI  Unfortunately confusion and repeat UTI symptoms on 7/20  - 7/20 reports burning with urination, awaiting UA w/reflex, WBC 10-20   - 7/21 - urine culture + > 100K Enterococcus faecalis, started Levaquin  - Would treat for 7 days.  - Urology consulted - no further recs. May be a candidate for prophylactic antibiotics  - Does follow with [facility]necology at [facility] - last seen in 2020.  - Will need re-referral at discharge. History of stroke  Assessment & Plan  MRI brain 2/22 - 1.  New small acute /more recent interval lacunar infarction in the right cerebral peduncle (series 4, image 14).   2.  Extensive multifocal large, previously present right middle cerebral artery infarction which is evolving with superimposed areas of evolving hemorrhage likely related to hemorrhagic transformation of subacute right MCA infarction.  No significant   mass effect. 3.  Mild, chronic microangiopathy. S/P thrombectomy 2/12 by Dr Sabino Sen   Secondary stroke prophylaxis at this time with Eliquis 5 mg twice daily   PT, OT, SLP. Review modifiable risk factors and provide stroke education  Monitor for poststroke pain  Eliquis for secondary stroke prophylaxis    Outpatient f/u with Neurology    Obstructive sleep apnea  Assessment & Plan  Previously non-compliant with hospital or home CPAP  Outpatient f/u with PCP. Hypercalcemia  Assessment & Plan  Suspect 2/2 primary hyperparathyroidism  7/8 iCal 1.12, Phos 3.3  Home: Sensipar 30mg daily  Here: Same  Recommend outpatient f/u with Endocrinology      HTN (hypertension)  Assessment & Plan  Tends to run on the low/normal side. Home: Toprol XL 25mg daily  Here: Same  Has not been able to get meds at times due to not meeting parameters. Monitor and adjust as appropriate  - Spironolactone held 7/15 and d/c 7/16  IM consulted to assist with management    Bipolar depression [facility])  Assessment & Plan  Home: Bupropion 100mg BID, Cymbalta 60mg BID, Ativan 0.25mg BID PRN (however this has not been filled since January 2023 as per PDMP)  Here: Bupropion 100mg BID, Cymbalta 60mg BID  Consulting rehab psychology for support  Adjust as appropriate  Outpatient f/u with PCP    Severe protein-calorie malnutrition ([address])  Assessment & Plan  Malnutrition Findings:                                 BMI Findings: Body mass index is 20.89 kg/m².      Consulted nutrition    Fall  Assessment & Plan  - s/p slip fall onto buttocks 7/19  - did not ask for assistance  - Now with bed alarms and 1:1 after failing frequent checks.  - CTH, CT C-spine without acute findings, XR Hip/Pelvis without acute findings  - 2 days later with back pain and found to have possible new wedging of T12 - age indeterminate without fracture line  - Pain control as detailed below. - neurosurgery recommend- thoracic spine precautions,TLSO brace, repeat TL XR with follow-up in 2 weeks      Osteoporosis  Assessment & Plan  Hx L1 compression fx s/p kyphoplasty  Has multiple compression fractures noted on Thoracic spine imaging   - And possibly new anterior wedging of T12 on most recent imaging. Was previously on Prolia - last given in 2020   - Held due to dental issues  Recommend outpatient f/u with Endocrinology  2/23 Vitamin D level is 41.7    Anemia  Assessment & Plan  2/2 L leg hematoma (most recent imaging negative for extravasation)  At home on eliquis  No reversal inpatient due to recent CVA  Prior to admission Hgb 10-12. Ravi 7/2 6.8 - given 1 unit pRBC  7/24 Hgb 8.5 > 9.2 > 9.3 > 10.1 > 9.9 > 10.9 > 9.6 > 9.5  Monitor, transfuse as appropriate. IM consulted to assist with management    CHF (congestive heart failure) ([address])  Assessment & Plan  Wt Readings from Last 3 Encounters:   07/24/23 48.1 kg (106 lb 0.7 oz)   06/30/23 55.3 kg (122 lb)   06/06/23 47.9 kg (105 lb 9.6 oz)     2/2023 Echo 30% with G2DD, moderate AI  Home: Toprol XL 25mg daily, Aldactone 25mg daily   - Limited by BP - no ACE/ARB   - no SGLT2i due to recurrent UTI  Here: Toprol XL 25mg daily  Unclear etiology of cardiomyopathy  Possible consideration for ischemic work-up outpatient, poor candidate for aggressive procedures   Monitor I/O/daily weights, volume status, lytes. IM consulted to assist with management  Outpatient f/u with Dr. Ismael Cardenas      Mixed stress and urge urinary incontinence  Assessment & Plan  Frankly incontinent in the hospital  History of pubovaginal sling with [facility]  Initiate timed voids and scans   - PVRs have all been low   - Timed voids help significantly with continence.   Has history of recurrent UTIs - see that entry for details  Close continence care      Gastroesophageal reflux disease without esophagitis  Assessment & Plan  Home: PPI daily  Here: PPI daily    Seronegative arthropathy of multiple sites [facility])  Assessment & Plan  Follows with Dr. Dionne Mann with Rheum and is on Hydroxychloroquine 200mg BID. Continue home regimen and follow-up with rheumatology on discharge    Mild intermittent asthma without complication  Assessment & Plan  No acute exacerbation  Home: Albuterol PRN  Here: Same    Chronic pain syndrome  Assessment & Plan  Joint and back pain  Follows with Dr. Rodrigo Calhoun with [facility]  Reviewed PDMP - at home on MSIR 7.5mg BID PRN. Here: MSIR 7.5mg BID PRN  - Outpatient f/u with Dr. Rodrigo Calhoun. Thrush-resolved as of 7/24/2023  Assessment & Plan  Noted on admission. Now resolved  7/7 started on nystatin swish/spit  - 7/11 improving  - Continued for at least 7 days with 2-3 days after her antibiotics stop      Health Maintenance  #Delirium/Sleep: At high risk. See cognitive impairment above. #Pain: Tylenol scheduled 650mg Q6hr, Gabapentin 300mg BID (will try to wean down here), Cymbalta 60mg BID, MSIR 7.5mg TID PRN, Lidoderm, Voltaren. #Bowel: Last BM 7/24. PRN bisacodyl suppository/miralax  #Bladder: See incontinence above  #Skin/Pressure Injury Prevention: Turn Q2hr in bed, with weight shifts [id number] in wheelchair. Float heels in bed. #DVT Prophylaxis: Fully anticoagulated on eliquis 5mg BID, SCDs. #GI Prophylaxis: Protonix already for chronic issues. #Code Status: Full Code  #FEN: Level 1/NTL, Ensure HP Pudding- Sg Comfort at Breakfast/Lunch/Dinner  #Dispo: Team 7/12: ADD to be determined now with new compression fracture and pain and currently treating recurrent UTI.  She has had a decline in function given her pain and new spinal precautions    Objective:    Functional Update:   PT: Marika-CGA bed mobility, transfers, and ambulation 72' with RW  OT: min-modA ADLs  SLP: Level 1/NTL    Allergies per EMR    Physical Exam:  Temp:  [97.5 °F (36.4 °C)-97.6 °F (36.4 °C)] 97.6 °F (36.4 °C)  HR:  [] 95  Resp:  [14-20] 17  BP: ()/(58-70) 108/58  Oxygen Therapy  SpO2: 95 %  O2 Flow Rate (L/min): 2 L/min    Gen: No acute distress, frail appearing  HEENT: Moist mucus membranes, Normocephalic/Atraumatic  Cardiovascular: Regular rate, rhythm, S1/S2. Distal pulses palpable  Heme/Extr: No edema  Pulmonary: Non-labored breathing. Lungs CTAB  : No lopes  GI: Soft, non-tender, non-distended. BS+  MSK:   R shoulder with full AROM. Some discomfort with resisted external and internal rotation. Neers positive, painful ARC negative at around 110 degrees. Drop arm negative. Muñoz positive. Yergason/Speeds negative. Negative apprehension testing. Negative scarf. L hip with pain with palpation over greater trochanter - mild. Holds neck in extended position. Integumentary: Skin is warm, dry. Neuro: AAOx3, Speech is intact. Appropriate to questioning. Tone is normal. Overall MMT testing is stable, with generally 4-5/5 strength throughout with some pain limitations in L hip and R shoulder. Psych: Normal mood and affect. Diagnostic Studies: Reviewed, R shoulder XR final read is pending.     Laboratory:  Reviewed   Results from last 7 days   Lab Units 07/24/23  0510 07/22/23  0900 07/20/23  0643   HEMOGLOBIN g/dL 9.5* 9.6* 10.9*   HEMATOCRIT % 29.7* 30.4* 36.2   WBC Thousand/uL 4.28* 7.76 4.78     Results from last 7 days   Lab Units 07/24/23  0510 07/22/23  0916 07/20/23  0643   BUN mg/dL 13 16 17   POTASSIUM mmol/L 3.7 4.2 4.1   CHLORIDE mmol/L 111* 110* 111*   CREATININE mg/dL 0.76 0.92 0.89   AST U/L  --  21  --    ALT U/L  --  15  --             Patient Active Problem List   Diagnosis   • Chronic pain syndrome   • Mild intermittent asthma without complication   • Ambulatory dysfunction   • Seronegative arthropathy of multiple sites (HCC)   • Gastroesophageal reflux disease without esophagitis   • Mixed stress and urge urinary incontinence   • CHF (congestive heart failure) ([address])   • Anemia   • Undifferentiated connective tissue disease (HCC)   • Primary generalized (osteo)arthritis   • Peripheral neuropathy   • Osteoporosis   • Hx of compression fracture of spine   • Lumbar spondylosis   • Cervical spondylosis   • Closed fracture of multiple ribs of right side   • Traumatic pneumothorax   • Closed fracture of transverse process of lumbar vertebra [facility])   • Liver contusion   • Fall   • Acute pain due to trauma   • Right shoulder pain   • Severe protein-calorie malnutrition (HCC)   • Bipolar depression ([facility])   • Rheumatoid arthritis of multiple sites with negative rheumatoid factor ([facility])   • 'light-for-dates' infant with signs of fetal malnutrition   • LAILA (acute kidney injury) ([address])   • Polypharmacy   • Cardiomyopathy ([address])   • Vitamin D insufficiency   • Stroke ([address])   • HTN (hypertension)   • HFrEF (heart failure with reduced ejection fraction) ([facility])   • Hypercalcemia   • Discoloration of skin of foot   • Abdominal pain   • Acute CVA (cerebrovascular accident) ([address])   • History of bacteremia   • Obstructive sleep apnea   • History of stroke   • Urinary tract infection without hematuria   • AMS (altered mental status)   • Gout of foot   • Dysphagia   • Wound of left ankle   • Hypokalemia   • Sinus tachycardia   • Hypomagnesemia   • Insomnia   • Nocturnal hypoxia   • Stroke-like symptoms   • Cerebral aneurysm, nonruptured   • Leg hematoma, left, initial encounter   • Thrush   • Night terrors   • Sundowning   • Cervical dystonia         Medications  Current Facility-Administered Medications   Medication Dose Route Frequency Provider Last Rate   • acetaminophen  650 mg Oral Q6H [address] Juanita Myers MD     • albuterol  2 puff Inhalation Q6H PRN Juanita Myers MD     • apixaban  5 mg Oral BID Juanita Myers MD     • buPROPion  100 mg Oral BID Juanita Myers MD     • cinacalcet  30 mg Oral Daily With Breakfast Juanita Myers MD     • Diclofenac Sodium  2 g Topical 4x Daily LAURA Carbone • docusate sodium  100 mg Oral BID Boris Velasquez MD     • DULoxetine  60 mg Oral BID Huy Huerta MD     • gabapentin  300 mg Oral BID Boris Velasquez MD     • hydroxychloroquine  200 mg Oral BID With Meals Huy Huerta MD     • lamoTRIgine  150 mg Oral HS Huy Huerta MD     • levofloxacin  250 mg Oral Q24H Ramonita Julian PA-C     • lidocaine  1 patch Topical HS LAURA Rutledge     • magnesium Oxide  400 mg Oral BID Huy Huerta MD     • melatonin  3 mg Oral HS Huy Huerta MD     • metoprolol succinate  25 mg Oral Daily LAURA Larose     • morphine  7.5 mg Oral TID PRN Boris Velasquez MD     • ondansetron  4 mg Oral Q6H PRN Huy Huerta MD     • pantoprazole  40 mg Oral Early Morning Huy Huerta MD     • polyethylene glycol  17 g Oral Daily PRN LAURA Rutledge     • polyethylene glycol  17 g Oral Once LAURA Rutledge     • saliva substitute  5 spray Mouth/Throat 4x Daily PRN LAURA Roy     • senna  1 tablet Oral BID Boris Velasquez MD     • simethicone  80 mg Oral 4x Daily (with meals and at bedtime) LAURA Roy            ** Please Note: Fluency Direct voice to text software may have been used in the creation of this document.  **

## 2023-07-24 NOTE — PROGRESS NOTES
OT Daily Treatment Note       07/24/23 0700   Pain Assessment   Pain Assessment Tool 0-10   Pain Score 6   Pain Location/Orientation Orientation: Mid;Location: Back   Hospital Pain Intervention(s) Repositioned   Restrictions/Precautions   Precautions 1:1;Aspiration;Bed/chair alarms;Cognitive; Fall Risk;Pain;Spinal precautions;Supervision on toilet/commode;Visual deficit   Braces or Orthoses TLSO   Lifestyle   Autonomy "I'm not sure why I just can't remember what BLT stands for"   Eating   Comment with PCA at end of session   Oral Hygiene   Reason if not Attempted Refused to perform  (requesting to complete OH after breakfast)   Oral Hygiene CARE Score 7   Shower/Bathe Self   Type of Assistance Needed Physical assistance   Physical Assistance Level 51%-75%   Comment SB seated EOB; pt now with spinal precautions requiring inc assistance with bathing including distal LE and posterior bathing. Shower/Bathe Self CARE Score 2   Bathing   Assessed Bath Style Sponge Bath   Tub/Shower Transfer   Reason Not Assessed Sponge Bath   Upper Body Dressing   Type of Assistance Needed Physical assistance   Physical Assistance Level 76% or more   Comment Mod A to don standard shirt 2* inc back pain, TA for TLSO brace which is new to pt   Upper Body Dressing CARE Score 2   Lower Body Dressing   Type of Assistance Needed Physical assistance   Physical Assistance Level 51%-75%   Comment trialed LB dressing with use of reacher to maintain new spinal precautions, required mod A for technique 2* pts dec coordination in LUE (at baseline).    Lower Body Dressing CARE Score 2   Putting On/Taking Off Footwear   Type of Assistance Needed Physical assistance   Physical Assistance Level Total assistance   Comment TA for donning/doffing socks 2* new spinal precautions   Putting On/Taking Off Footwear CARE Score 1   Roll Left and Right   Type of Assistance Needed Physical assistance   Physical Assistance Level 25% or less   Comment min A for log rolling   Roll Left and Right CARE Score 3   Lying to Sitting on Side of Bed   Type of Assistance Needed Physical assistance   Physical Assistance Level 26%-50%   Comment min A for log rolling   Lying to Sitting on Side of Bed CARE Score 3   Sit to Stand   Type of Assistance Needed Physical assistance   Physical Assistance Level 25% or less   Comment min A/CG with RW and TLSO on   Sit to Stand CARE Score 3   Bed-Chair Transfer   Type of Assistance Needed Physical assistance   Physical Assistance Level 25% or less   Comment min A SPT with RW and TLSO On   Chair/Bed-to-Chair Transfer CARE Score 3   Cognition   Overall Cognitive Status Impaired   Arousal/Participation Alert; Cooperative   Attention Attends with cues to redirect   Orientation Level Oriented X4   Memory Decreased short term memory   Following Commands Follows one step commands with increased time or repetition   Additional Activities   Additional Activities Other (Comment)   Additional Activities Comments provided edu and handouts for edu on spinal precautions and donning/doffing TLSO brace. OT to review extensively with family later this morning. pt req inc edu on "BLT" as she is unable to recall spinal precautions at this time. Activity Tolerance   Activity Tolerance Patient limited by pain   Assessment   Treatment Assessment Pt participated in skilled OT session with focus on ADL retraining, functional transfer training, UE strengthening/ROM, endurance training, compensatory technique education, continued education, energy conservation and activity engagement . See flowsheet for details of session and current functional status.  Pt is limited by weakness, decreased ROM, impaired balance, decreased endurance, decreased coordination, increased fall risk, decreased ADLS, decreased IADLS, pain, decreased activity tolerance, decreased safety awareness, impaired judgement, decreased cognition and decreased strength and requires skilled OT services to increase independence and safety with ADL completion in prep for DC home. Plan to continue ADL retraining, functional transfer training, UE strengthening/ROM, endurance training, cognitive reorientation, Pt/caregiver education, equipment evaluation/education, neuro re-ed, compensatory technique education, continued education, energy conservation and activity engagement  to address barriers mentioned above. Prognosis Fair   Problem List Decreased strength;Decreased range of motion;Decreased endurance; Impaired balance;Decreased mobility; Decreased cognition; Impaired judgement;Decreased safety awareness;Orthopedic restrictions;Pain   Barriers to Discharge Inaccessible home environment;Decreased caregiver support   Plan   Treatment/Interventions ADL retraining;Functional transfer training; Therapeutic exercise; Endurance training;Patient/family training; Compensatory technique education   Progress Improving as expected   Recommendation   OT Discharge Recommendation Home with home health rehabilitation   OT Therapy Minutes   OT Time In 0700   OT Time Out 0830   OT Total Time (minutes) 90   OT Mode of treatment - Individual (minutes) 90   OT Mode of treatment - Concurrent (minutes) 0   OT Mode of treatment - Group (minutes) 0   OT Mode of treatment - Co-treat (minutes) 0   OT Mode of Treatment - Total time(minutes) 90 minutes   OT Cumulative Minutes 1298   Therapy Time missed   Time missed?  No

## 2023-07-24 NOTE — PROGRESS NOTES
07/24/23 1330   Pain Assessment   Pain Assessment Tool 0-10   Pain Score 8   Pain Location/Orientation Location: Back   Hospital Pain Intervention(s) Repositioned; Rest  (Notified RN at end of session)   Restrictions/Precautions   Precautions 1:1;Aspiration;Bed/chair alarms;Cognitive; Fall Risk;Impulsive;Pain;Spinal precautions;Supervision on toilet/commode;Visual deficit   Weight Bearing Restrictions No   ROM Restrictions Yes  (spinal precautions)   Braces or Orthoses TLSO   Cognition   Overall Cognitive Status Impaired   Arousal/Participation Alert; Cooperative   Attention Attends with cues to redirect   Orientation Level Oriented X4   Memory Decreased short term memory   Following Commands Follows one step commands with increased time or repetition   Sit to Lying   Type of Assistance Needed Physical assistance   Physical Assistance Level 26%-50%   Comment Min/ModA for management of trunk and LEs, VC to ensure patient adhereing to spinal precautions   Sit to Lying CARE Score 3   Sit to Stand   Type of Assistance Needed Physical assistance   Physical Assistance Level 25% or less   Comment Fei/CGA with RW, VCs for proper hand placement and for R foot placement. She has tendency to move RLE too far under her   Sit to Stand CARE Score 3   Bed-Chair Transfer   Type of Assistance Needed Physical assistance   Physical Assistance Level 25% or less   Comment Fei/CGa with RW, VC to stay in RW when turning   Chair/Bed-to-Chair Transfer CARE Score 3   Walk 10 Feet   Type of Assistance Needed Incidental touching   Comment CGA with RW   Walk 10 Feet CARE Score 4   Walk 50 Feet with Two Turns   Type of Assistance Needed Incidental touching;Physical assistance   Physical Assistance Level Total assistance   Comment CGA with RW + CF. ambulation distance limited by low back pain this session.  Unable to consistently ambulate 50 ft this session   Walk 50 Feet with Two Turns CARE Score 1   Ambulation   Primary Mode of Locomotion Prior to Admission Walk   Distance Walked (feet) 45 ft  (60ft, 40 ft)   Assist Device Roller Walker   Gait Pattern Slow Amy; Inconsistant Amy;Narrow AYLA;Step to; Step through; Improper weight shift; Forward Flexion   Limitations Noted In Balance; Endurance; Heel Strike;Posture; Safety;Speed;Strength;Swing   Walk Assist Level Contact Guard   Does the patient walk? 2. Yes   Curb or Single Stair   Style negotiated Curb   Type of Assistance Needed Physical assistance   Physical Assistance Level 25% or less   Comment 6 inch curb with RW x2 trials with Fei for management of RW   1 Step (Curb) CARE Score 3   Therapeutic Interventions   Strengthening Trialed standing therex, but increase in back pain. Instructed patient in the following seated therex: seated hip extension with YTB 3x15, seated knee extension with 1.5# 3x15, seated hip abduction with YTB 3x15, seated hamstring curl with YTB 3x15   Assessment   Treatment Assessment Patient participated in 60 minute skilled physical therapy session focusing on transfers, gait training, curb management, and LE strengthening. Patient continues to be limited by low back pain. Reported pain as high as 8/10 during our session. Patient grimacing and moaning with transfers and curb navigation. Ambulation distance limited 2/2 to back pain. trialed standing therex but patient unable to tolerate due to increase in low back pain. Required frequent rest throughout the session. Patient would benefit from continued PT services to improve independence and safety with functional mobility. Problem List Decreased strength;Decreased range of motion;Decreased endurance; Impaired balance;Decreased mobility; Decreased cognition; Impaired judgement;Decreased safety awareness;Orthopedic restrictions;Pain   Barriers to Discharge Inaccessible home environment;Decreased caregiver support   PT Barriers   Functional Limitation Car transfers; Ramp negotiation;Stair negotiation;Standing;Transfers; Walking Plan   Treatment/Interventions Functional transfer training;LE strengthening/ROM; Elevations; Endurance training; Therapeutic exercise;Patient/family training;Equipment eval/education; Bed mobility;Gait training   Progress Improving as expected   Recommendation   PT Discharge Recommendation Home with home health rehabilitation   Equipment Recommended Walker   PT Therapy Minutes   PT Time In 1330   PT Time Out 1430   PT Total Time (minutes) 60   PT Mode of treatment - Individual (minutes) 60   PT Mode of treatment - Concurrent (minutes) 0   PT Mode of treatment - Group (minutes) 0   PT Mode of treatment - Co-treat (minutes) 0   PT Mode of Treatment - Total time(minutes) 60 minutes   PT Cumulative Minutes 1023   Therapy Time missed   Time missed?  No     Yesy Brown, PT, DPT

## 2023-07-24 NOTE — PROGRESS NOTES
Physical Medicine and Rehabilitation Progress Note  Guy Zuñiga 70 y.o. female MRN: [id number]  Unit/Bed#: [facility] 970-01 Encounter: [id number]      Assessment & Plan:     T12 mild anterior wedging (chronic compression fxs at T9, T11, L1 c/ kyphoplasty, L1 and L4, with chronic severe kyphosis)  - Thoracic spine precautions  - TLSO brace when OOB and >45 deg - required encouragement today   - Follow-up NSx in 2 weeks with repeat TL XR   - Continue PT, OT, CG training - will recommend extending ARC course    -Mild anterior wedging of T12, not present on the recent prior CT. A fracture line is not visualized and this is age-indeterminate. 2.  Otherwise stable chronic compression fractures at T9, T11, and L1 with kyphoplasty at L1. 3.  Kyphosis at the thoracolumbar junction. L spine XR - No acute fracture. Stable chronic compression deformities at L1 and L4.   - Nsx consult recommends TLSO brace with OOB and HOB >45 deg   ? "Mild age-indeterminate T12 compression, multiple  Chronic TL compression fracture. Hx of fall. TL pain, and tenderness noted, otherwise neurologically non focal. Upright Thoracic XR stable mild anterior compression-age indeterminate. From NSx perspective no procedure is anticipated, continue conservative treatment with bracing, PT/OT and pain control. Follow-up in 2 weeks with upright thoracolumbar spine x-rays in brace.  S/O.    - if develop red flag signs - acute neurologic changes, etc- recommend MRI spine and urgent Nsx c/s in interim  - strength/sensation unchanged in legs, no acute b/b changes   - Continue high fall precautions - 1:1   - Optimal pain mgm but try to avoid oversedation, AMS, or resp depression    - Reviewed plan with daughter 7/22 who is agreement   - Patient with hx of elevated Calcium so would avoid Ca and Vit D supplementation at this time; she was on Prolia in past - would recommend OP follow-up with endocrinology/PCP given osteoporesis/compression fracture hx    Unwitnessed fall, landing on buttocks with residual L buttock tenderness on Eliquis; L buttock pain better; but now some increased back pain found to have mild age-indeteminate T12 comp fx  - T/L XR Spine ordered   T spine -  1.  Mild anterior wedging of T12, not present on the recent prior CT. A fracture line is not visualized and this is age-indeterminate. 2.  Otherwise stable chronic compression fractures at T9, T11, and L1 with kyphoplasty at L1. 3.  Kyphosis at the thoracolumbar junction. L spine XR - No acute fracture. Stable chronic compression deformities at L1 and L4.  - 7/20 CBC/BMP unremarkable with improving Hb   - 7/19 - No head/neck trauma - denied H/N pain, no midline or paraspinal TTP, AROM/PROM of neck full without pain, no scalp edema, bruising; denies h/a, neck pain, neuropathic complaints, neuro exam - strength at baseline  [facility] protocol to obtain C-spine and head CTH stat 7/19  - C spine CT - No acute fracture or evidence for traumatic malalignment.  No interval change since prior exam.   - CTH - no acute findings  - L hip/pelvis XR without acute findings   - Continue A/C   - Recommend continuing 1:1 as patient somewhat confused at times particularly at night and has had fall/wondering episode  - Patient with mild confusion at times  - Limit sedating meds when possible but need to still treat pain adequately   - Treating possible UTI with >100 K enterococcus, some intermittent confusion, recent dysuria  - Ensure call bell in place - discussed with nursing   - high fall precautions - alarms, not unattended in bathroom, etc  - Monitor subacute symptoms or changes in exam  - Recommend alarms at home with family       R shoulder pain   - On exam R shoulder ROM near full with slight pain on overhead movement; no edema; +Muñoz - possible RTC arthropathy/risk of OA - Obtain R shoulder XR to eval for OA or fx     Decline in ADLs and mobility  Multifactorial: Recent fall with hematoma, recent stroke with residual deficits, CHF, pain, deconditioning, recent UTI and now some increased pain and functional decline s/p unwitnessed fall; now with possible new T12 vertebral wedging and pain now with TLSO brace  - Optimal management of each as listed -   - Thoracic spine precautions; TLSO brace  - Neuro exam grossly stable > continue to monitor for acute changes  - Vitals stable  - Treat possible UTI   - Optimal pain control   - Continue acute comprehensive interdisciplinary inpatient rehabilitation to include intensive skilled therapies (PT, OT, ST) with oversight and management by rehabilitation physician as well as inpatient rehab level nursing, case management and weekly interdisciplinary team meetings to improved functional gains again and to ensure adequate caregiver training - discussed with pt/family   - Updated daughter again      Possible UTI on recurrent UTIs that can cause confusion per family and increase risk of falls  - was c/o dysuria recently prompting UA/UC   - >100K enterococcus s/p recent Macrobid for same organism  - IM c/s and recommend levaquin; follow-up susceptibilities should appropriate for levaquin  - Recommend urology c/s - pending; monitor PVRs  - Proactive toileting    History of CHF with ejection fraction of 30%, history of hypertension with relative hypotension recently, recent history of sinus tachycardia  BP overall acceptable   Comgmt with medicine who is consulted   Compensation:  Appears adequate  Monitor weights, vitals, and physical exam  Medications per recommendations of prior providers and our medicine team:   Metoprolol with holding parameters  Spironolactone now held with soft BP  Prazosin stopped earlier  Monitor electrolytes and ensure optimal management  BMP acceptable      History of stroke and refractory dysphagia, impaired intake, dehydration; protein jacklyn malnutrition, wt loss   Eliquis, optimal blood pressure control  Fall precautions  Continue PT, OT, ST  Dysphagia 1 nectar thick liquid, monitor fluid and caloric intake  Monitor labs intermittently  SLP reports swallow stable from prior admission but concern for esophageal dysmotility/stricture   - Esophagram held due to degree of dysphagia and risk of aspiration with barium; her OP GI doc recommended esophogram with air but that too has some barium  - Nutrition c/s'd and following noting significant wt loss, muscle wasting   - Calorie counts, close monitoring intake, wts  - Counseled patient/family on need to improve intake on mod diet  - Patient refusing PEG tube; encourage improved intake on current diet - did better with supplements recently  - Patient will need OP GI follow-up and consideration for EGD and PEG - if she changes her mind and wants to consider PEG now than we will c/s GI here      History of left leg hematoma, anemia  Improving now s/p fall in hospital   Hb overall stable s/p fall 7/19 > monitor CBC Monday    monitor leg, vitals, labs intermittently  Patient follow-up with general surgery  Was cleared to continue Eliquis     Bipolar depression history  Mood  stable today   Continue Bupropion, Cymbalta   Supportive counseling provided   Psychology consulted while in [address] on and continue to encourage deep breathing/relaxation/behavioral management techniques     Pain mgmt   - Adequate control on current regimen - does not appear overly sedated  -SA MS Contin TID PRN with holding parameters;  Hold for oversedation, AMS, or RR<12.  - Decreased gabapentin slightly from 400mg BID to 300mg BID to try to balance pain mgmt and avoid oversedation   - Continue APAP   - Closely monitor pain and Ses of pain meds     Sundowning/delirium  Assessment & Plan  - Mentation at baseline today; not overly sedated   - Wandered all the way down to the cafeteria on the 1st floor on 7/9 and now recent fall 7/20 with possible new mild age-indet T12 comp fx  - Recommend in person 1:1 > continue   - Again UCx with 100k enterococcus. > now on levaquin  - Neuro exam stable without focal changes; CMP largely unremarkable to explain mentation; CBC stable without leukocytosis; Ammonia wnl   - High fall precautions  - Delirium precautions  - Limit sedating meds when possible while still adequately treating pain     Mild intermittent asthma without complication  Assessment & Plan  No acute exacerbation  Home: Albuterol PRN  Here: Same    Hypercalcemia  Assessment & Plan  Suspect 2/2 primary hyperparathyroidism  7/8 iCal 1.12, Phos 3.3  Home: Sensipar 30mg daily  Here: Same  Recommend outpatient f/u with Endocrinology    Cervical dystonia  Assessment & Plan  Recommend outpatient f/u with Dr. Dao Leone to evaluate for botulinum toxin injections  Appears to have a retrocollis. Night terrors  Assessment & Plan  Home: Prazosin  Here: none  Continue melatonin  Outpatient f/u with PCP      Hypomagnesemia  Assessment & Plan  Home: Mag oxide 800mg BID  Here: Mag oxide 400mg BID  Monitor intermittently     Sinus tachycardia  Assessment & Plan  Here: Toprol XL qday   IM consulted to assist with management  Outpatient f/u with Dr. Gerard Broad      History of stroke  Assessment & Plan  MRI brain 2/22 - 1.  New small acute /more recent interval lacunar infarction in the right cerebral peduncle (series 4, image 14). 2.  Extensive multifocal large, previously present right middle cerebral artery infarction which is evolving with superimposed areas of evolving hemorrhage likely related to hemorrhagic transformation of subacute right MCA infarction.  No significant   mass effect. 3.  Mild, chronic microangiopathy. S/P thrombectomy 2/12 by Dr Milly Genao   Secondary stroke prophylaxis at this time with Eliquis 5 mg twice daily   PT, OT, SLP.    Review modifiable risk factors and provide stroke education  Monitor for poststroke pain  Eliquis for secondary stroke prophylaxis  Outpatient f/u with Neurology    Obstructive sleep apnea  Assessment & Plan  Previously non-compliant with hospital or home CPAP  Outpatient f/u with PCP. 2L O2 here HS     Osteoporosis  Assessment & Plan  Hx L1 compression fx s/p kyphoplasty  Was previously on Prolia - last given in 2020   - Held due to dental issues  Recommend outpatient f/u with Endocrinology  2/23 Vitamin D level is 41.7  Hold Ca/Vit D with hyperCa hx       Mixed stress and urge urinary incontinence  Assessment & Plan  Toileting program  Treat UTI  Urology c/s     Gastroesophageal reflux disease without esophagitis  Assessment & Plan  Home: PPI daily  Here: PPI daily    Seronegative arthropathy of multiple sites [facility])  Assessment & Plan  Follows with Dr. Yovany Tobar with Rheum and is on Hydroxychloroquine 200mg BID. Continue home regimen and follow-up with rheumatology on discharge      CODE: Level 1: Full Code    Restrictions include: Fall precautions    Objective: Allergies per EMR  Diagnostic Studies: Reviewed  XR spine thoracic 3 vw   Final Result by Hortencia Odell MD (07/21 1706)      1. Mild anterior wedging of T12, not present on the recent prior CT. A fracture line is not visualized and this is age-indeterminate. 2.  Otherwise stable chronic compression fractures at T9, T11, and L1 with kyphoplasty at L1.      3.  Kyphosis at the thoracolumbar junction. The study was marked in [facility] for immediate notification. Workstation performed: [id number]         XR spine lumbar 2 or 3 views injury   Final Result by Hortencia Odell MD (07/21 1708)      No acute fracture. Stable chronic compression deformities at L1 and L4. Workstation performed: [id number]         CT head wo contrast   Final Result by Ned Alvarez MD (07/19 2025)      No interval change. No acute intracranial hemorrhage or depressed calvarial fracture identified. Chronic right MCA distribution infarct.                   Workstation performed: [id number]         CT spine cervical wo contrast   Final Result by Ned Alvarez MD (07/19 2029)      No acute fracture or evidence for traumatic malalignment. No interval change since prior exam.                  Workstation performed: [id number]         XR hip/pelv 2-3 vws left if performed   Final Result by Purnima Sarmiento MD (07/19 1856)      No acute osseous abnormality.             Workstation performed: [id number]         XR spine thoracolumbar 2 vw    (Results Pending)   XR shoulder 2+ vw right    (Results Pending)     See above as well    Laboratory: Labs reviewed  Results from last 7 days   Lab Units 07/20/23  0643 07/18/23  0516   HEMOGLOBIN g/dL 10.9* 9.9*   HEMATOCRIT % 36.2 30.9*   WBC Thousand/uL 4.78 5.07     Results from last 7 days   Lab Units 07/22/23  0916 07/20/23  0643   BUN mg/dL 16 17   SODIUM mmol/L 142 140   POTASSIUM mmol/L 4.2 4.1   CHLORIDE mmol/L 110* 111*   CREATININE mg/dL 0.92 0.89   AST U/L 21  --    ALT U/L 15  --             Drug regimen reviewed, all potential adverse effects identified and addressed:    Current Facility-Administered Medications   Medication Dose Route Frequency Provider Last Rate   • acetaminophen  650 mg Oral Q6H [facility] Lloyd Welsh MD     • albuterol  2 puff Inhalation Q6H PRN Lloyd Welsh MD     • apixaban  5 mg Oral BID Lloyd Welsh MD     • buPROPion  100 mg Oral BID Lloyd Welsh MD     • cinacalcet  30 mg Oral Daily With Breakfast Lloyd Welsh MD     • Diclofenac Sodium  2 g Topical 4x Daily LAURA Rutledge     • docusate sodium  100 mg Oral BID Sen Pimentel MD     • DULoxetine  60 mg Oral BID Lloyd Welsh MD     • gabapentin  300 mg Oral BID Sen Pimentel MD     • hydroxychloroquine  200 mg Oral BID With Meals Lloyd Welsh MD     • lamoTRIgine  150 mg Oral HS Lloyd Welsh MD     • levofloxacin  250 mg Oral Q24H Ramonita Julian PA-C     • lidocaine  1 patch Topical HS LAURA Rutledge     • magnesium Oxide  400 mg Oral BID Lloyd Welsh MD     • melatonin  3 mg Oral HS Lloyd Welsh MD • metoprolol succinate  25 mg Oral Daily LAURA Zavala     • morphine  7.5 mg Oral TID PRN Shorty Dobbins MD     • ondansetron  4 mg Oral Q6H PRN Durga Valladares MD     • pantoprazole  40 mg Oral Early Morning Durga Valladares MD     • polyethylene glycol  17 g Oral Daily PRN LAURA Rutledge     • polyethylene glycol  17 g Oral Once LAURA Rutledge     • saliva substitute  5 spray Mouth/Throat 4x Daily PRN LAURA Davis     • senna  1 tablet Oral BID Shorty Dobbins MD     • simethicone  80 mg Oral 4x Daily (with meals and at bedtime) LAURA Davis         •  albuterol  •  morphine  •  ondansetron  •  polyethylene glycol  •  saliva substitute      Chief Complaints:  Rehab follow-up     Subjective: On eval, patient reports she had pain earlier today in R shoulder but feels better now; she reports mild pain with lifting hand over head. Patient denies worsening back pain, radiating pain down legs, strength or sensation changes,  Worsening in b/b function, dysuria, fever, chills, or other new complaints. ROS: A 10 point ROS was performed; negative except as noted above.        Physical Exam:  07/23/23 0529 -- -- -- 100/56 -- -- -- -- -- --   07/23/23 0504 97.8 °F (36.6 °C) -- -- 98/60 -- 94 %         Vitals above reviewed on date of encounter    GEN:  Laying in bed in NAD  HEENT/NECK: MMM  CARDIAC: Regular rate rhythm, no murmers, no rubs, no gallops  LUNGS:  clear to auscultation, no wheezes, rales, or rhonchi  ABDOMEN: Soft, non-tender, non-distended, normal active bowel sounds  EXTREMITIES/SKIN:  No increased leg edema or calf TTP  MSK:   R shoulder with slight decreased ROM; non tender in subacromial region, no edema or erythema; +Muñoz   NEURO:   MENTAL STATUS: awake, oriented to person, place, time, and situation, MENTAL STATUS:  Appropriate wakefulness and interaction , Strength/MMT:  L prox LE about 5-/5 L distal LE 5/5, R side 5/5 - unchanged overall again; sensation stable again   PSYCH:  Affect:  Euthymic       ** Please Note: Fluency Direct voice to text software may have been used in the creation of this document.  **    I personally performed the required components and examined the patient myself in person on 7/23/23

## 2023-07-25 PROCEDURE — 97112 NEUROMUSCULAR REEDUCATION: CPT

## 2023-07-25 PROCEDURE — NC001 PR NO CHARGE: Performed by: PHYSICAL MEDICINE & REHABILITATION

## 2023-07-25 PROCEDURE — 92526 ORAL FUNCTION THERAPY: CPT

## 2023-07-25 PROCEDURE — 99232 SBSQ HOSP IP/OBS MODERATE 35: CPT | Performed by: PHYSICAL MEDICINE & REHABILITATION

## 2023-07-25 PROCEDURE — 97110 THERAPEUTIC EXERCISES: CPT

## 2023-07-25 PROCEDURE — 97535 SELF CARE MNGMENT TRAINING: CPT

## 2023-07-25 PROCEDURE — 97530 THERAPEUTIC ACTIVITIES: CPT

## 2023-07-25 PROCEDURE — 97116 GAIT TRAINING THERAPY: CPT

## 2023-07-25 PROCEDURE — 99232 SBSQ HOSP IP/OBS MODERATE 35: CPT | Performed by: INTERNAL MEDICINE

## 2023-07-25 RX ORDER — METOPROLOL SUCCINATE 25 MG/1
12.5 TABLET, EXTENDED RELEASE ORAL
Status: DISCONTINUED | OUTPATIENT
Start: 2023-07-25 | End: 2023-07-28

## 2023-07-25 RX ADMIN — DULOXETINE HYDROCHLORIDE 60 MG: 60 CAPSULE, DELAYED RELEASE ORAL at 17:51

## 2023-07-25 RX ADMIN — APIXABAN 5 MG: 5 TABLET, FILM COATED ORAL at 09:28

## 2023-07-25 RX ADMIN — HYDROXYCHLOROQUINE SULFATE 200 MG: 200 TABLET ORAL at 17:53

## 2023-07-25 RX ADMIN — HYDROXYCHLOROQUINE SULFATE 200 MG: 200 TABLET ORAL at 09:29

## 2023-07-25 RX ADMIN — ONDANSETRON 4 MG: 4 TABLET, ORALLY DISINTEGRATING ORAL at 19:32

## 2023-07-25 RX ADMIN — SIMETHICONE 80 MG: 80 TABLET, CHEWABLE ORAL at 21:14

## 2023-07-25 RX ADMIN — Medication 5 SPRAY: at 09:32

## 2023-07-25 RX ADMIN — GABAPENTIN 300 MG: 300 CAPSULE ORAL at 09:27

## 2023-07-25 RX ADMIN — ALBUTEROL SULFATE 2 PUFF: 90 AEROSOL, METERED RESPIRATORY (INHALATION) at 23:41

## 2023-07-25 RX ADMIN — SENNOSIDES 8.6 MG: 8.6 TABLET, FILM COATED ORAL at 09:27

## 2023-07-25 RX ADMIN — SENNOSIDES 8.6 MG: 8.6 TABLET, FILM COATED ORAL at 17:51

## 2023-07-25 RX ADMIN — MORPHINE SULFATE 7.5 MG: 15 TABLET ORAL at 09:25

## 2023-07-25 RX ADMIN — PANTOPRAZOLE SODIUM 40 MG: 40 TABLET, DELAYED RELEASE ORAL at 05:07

## 2023-07-25 RX ADMIN — LIDOCAINE 5% 2 PATCH: 700 PATCH TOPICAL at 21:14

## 2023-07-25 RX ADMIN — ONDANSETRON 4 MG: 4 TABLET, ORALLY DISINTEGRATING ORAL at 13:06

## 2023-07-25 RX ADMIN — Medication 5 SPRAY: at 21:15

## 2023-07-25 RX ADMIN — MAGNESIUM OXIDE TAB 400 MG (241.3 MG ELEMENTAL MG) 400 MG: 400 (241.3 MG) TAB at 09:28

## 2023-07-25 RX ADMIN — BUPROPION HYDROCHLORIDE 100 MG: 100 TABLET, FILM COATED ORAL at 17:53

## 2023-07-25 RX ADMIN — DOCUSATE SODIUM 100 MG: 100 CAPSULE ORAL at 17:50

## 2023-07-25 RX ADMIN — ACETAMINOPHEN 650 MG: 325 TABLET, FILM COATED ORAL at 17:51

## 2023-07-25 RX ADMIN — BUPROPION HYDROCHLORIDE 100 MG: 100 TABLET, FILM COATED ORAL at 09:30

## 2023-07-25 RX ADMIN — MORPHINE SULFATE 7.5 MG: 15 TABLET ORAL at 17:50

## 2023-07-25 RX ADMIN — Medication 5 SPRAY: at 17:54

## 2023-07-25 RX ADMIN — CINACALCET 30 MG: 30 TABLET, FILM COATED ORAL at 09:30

## 2023-07-25 RX ADMIN — DULOXETINE HYDROCHLORIDE 60 MG: 60 CAPSULE, DELAYED RELEASE ORAL at 09:27

## 2023-07-25 RX ADMIN — LEVOFLOXACIN 250 MG: 250 TABLET, FILM COATED ORAL at 14:13

## 2023-07-25 RX ADMIN — MELATONIN TAB 3 MG 3 MG: 3 TAB at 21:15

## 2023-07-25 RX ADMIN — DICLOFENAC SODIUM 2 G: 10 GEL TOPICAL at 21:16

## 2023-07-25 RX ADMIN — SIMETHICONE 80 MG: 80 TABLET, CHEWABLE ORAL at 09:28

## 2023-07-25 RX ADMIN — ALBUTEROL SULFATE 2 PUFF: 90 AEROSOL, METERED RESPIRATORY (INHALATION) at 05:07

## 2023-07-25 RX ADMIN — DOCUSATE SODIUM 100 MG: 100 CAPSULE ORAL at 09:28

## 2023-07-25 RX ADMIN — GABAPENTIN 300 MG: 300 CAPSULE ORAL at 17:51

## 2023-07-25 RX ADMIN — LAMOTRIGINE 150 MG: 100 TABLET ORAL at 21:14

## 2023-07-25 RX ADMIN — ACETAMINOPHEN 650 MG: 325 TABLET, FILM COATED ORAL at 05:07

## 2023-07-25 RX ADMIN — ACETAMINOPHEN 650 MG: 325 TABLET, FILM COATED ORAL at 00:11

## 2023-07-25 RX ADMIN — APIXABAN 5 MG: 5 TABLET, FILM COATED ORAL at 17:51

## 2023-07-25 RX ADMIN — MAGNESIUM OXIDE TAB 400 MG (241.3 MG ELEMENTAL MG) 400 MG: 400 (241.3 MG) TAB at 17:51

## 2023-07-25 RX ADMIN — ACETAMINOPHEN 650 MG: 325 TABLET, FILM COATED ORAL at 23:20

## 2023-07-25 NOTE — PROGRESS NOTES
Pt had one episode of emesis 120ml undigested food this afternoon Neyda Molina NP notified none since

## 2023-07-25 NOTE — PROGRESS NOTES
Internal Medicine Progress Note  Patient: Aldo Alston  Age/sex: 70 y.o. female  Medical Record #: [id number]      ASSESSMENT/PLAN: (Interval History)  Aldo Alston is seen and examined and management for following issues:    Left lateral thigh hematoma   • Stable     ABLA  • S/p 1 unit PRBCs 7/2/23  • CBC 7/18/23 stable     Fall  • While getting OOB 7/19  • Did not hit head but unwitnessed  • CTH/CT cerv spine with no acute changes     Fracture T12  • Age indeterminate  • NS saw 7/22 = no procedure/surgery needed; continue TLSO brace; follow-up in 2 weeks with upright thoracolumbar spine x-rays in brace     Right shoulder pain  • Xray done 7/23 and read is pending     Chronic combined systolic/diastolic heart failure   • Originally diagnosed 2004 when LVEF then was 10-15% and felt to be NICM  • Her LVEF did recover into 50's then fell when she had her CVA in 2/2023 to 30%  • Also has moderate AI/gr 2 DD  • Sees Dr Olivia Quesada a [facility] in 2/2023 when LVEF was found to be 30% which is most current  • Had been on Coreg in past but HF team switched her to Toprol when she had her stroke 2/2023  • Her soft BPs have limited starting her on ACEI/ARB as OP  • Home:  Toprol XL 25mg BID/Aldactone 25mg qd  • Here:  Toprol XL 25 mg QD  • D/C'd Aldactone 7/16  • Euvolemic  • Toprol held this AM 7/25/23 for SBP 94 = will switch to 12.5 mg qhs since misses @ 50% doses     Hypercalcemia  • Continue Cinacalcet here as was taking at home  • Calcium 8.3 on 7/24/23     HS hypoxia   • using 4L NC HS at home  • using O2 at night here     Nightmares  • placed on Minipress when here with us after her CVA  • D/w Dr Sarahy Chowdhury, d/c'd since had been too low to get  • Minipress dc'd 2/2 hypotension     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  • Has residual left sided weakness  • Had recrudescence of CVA 4/2023 with worsening left facial droop  • Continue Eliquis     Dysphagia  • Had admission for aspiration PNA at [facility] 5/2023 tx'd with Levaquin and Flagyl  • Continue pureed diet with NTL as at home  • ST following     Weight loss  • Continue Ensure HP pudding TID and Magic cups TID  • She was on no supplements at home so likely will gain some wt that she is eating/drinking supps here  • adamantly refuses PEG tube  • Nutrition following     Bipolar depression  • Home:  Wellbutrin 100mg BID/Cymbalta 60mg BID/Lamictal 150mg qhs  • Here:  Wellbutrin 100mg BID/Cymbalta 60mg BID/Lamictal 150mg qhs     Rheumatoid arthritis  • Continue Plaquenil 200mg BID as at home     Chronic pain/chronic opioid use  • 2/2 RA and lumbar spondylosis  • Pain management per PMR     Hx incidental right superior hypophyseal artery aneurysm  • Follows with NS Dr Marco Snell     [facility] urine 3/2023  • Got Cipro then to [facility] for tx at the time  • Maintain contact isolation     Dye allergy   • Was dye prepped on 7/4/23 with Benadryl 50mg IV x 1 w/o issues     Incidental findings  • Pancolitis with increasing inflammatory free fluid in pelvis  • Calcified granuloma RLL     Hypokalemia  • Got Kdur 40 meq x 2 on 7/8/23  • Stable at 3.7.     Enterococcus UTI  • S/p Dessa Grave Dr Depadua = sensitive  • UA for c/o dysuria again 7/20 = culture again with >100,000 Enterococcus  • Pt has multiple abx allergies. • Started Levaquin 250 mg q24hrs on 7/22/23 which is susceptible. • Uro saw 7/24/23 = may consider suppressive tx but see her Uro as OP     Discharge date:  TBD        The above assessment and plan was reviewed and updated as determined by my evaluation of the patient on 7/25/2023.     Labs:   Results from last 7 days   Lab Units 07/24/23  0510 07/22/23  0900   WBC Thousand/uL 4.28* 7.76   HEMOGLOBIN g/dL 9.5* 9.6*   HEMATOCRIT % 29.7* 30.4*   PLATELETS Thousands/uL 134* 157     Results from last 7 days   Lab Units 07/24/23  0510 07/22/23  0916   SODIUM mmol/L 144 142   POTASSIUM mmol/L 3.7 4.2   CHLORIDE mmol/L 111* 110*   CO2 mmol/L 28 30   BUN mg/dL 13 16 CREATININE mg/dL 0.76 0.92   CALCIUM mg/dL 8.3 8.7                   Review of Scheduled Meds:  Current Facility-Administered Medications   Medication Dose Route Frequency Provider Last Rate   • acetaminophen  650 mg Oral Q6H [facility] Sampson Gaffney MD     • albuterol  2 puff Inhalation Q6H PRN Sampson Gaffney MD     • apixaban  5 mg Oral BID Sampson Gaffney MD     • buPROPion  100 mg Oral BID Sampson Gaffney MD     • cinacalcet  30 mg Oral Daily With Breakfast Sampson Gaffney MD     • Diclofenac Sodium  2 g Topical 4x Daily Sampson Gaffney MD     • docusate sodium  100 mg Oral BID Eleni Cox MD     • DULoxetine  60 mg Oral BID Sampson Gaffney MD     • gabapentin  300 mg Oral BID Eleni Cox MD     • hydroxychloroquine  200 mg Oral BID With Meals Sampson Gaffney MD     • lamoTRIgine  150 mg Oral HS Sampson Gaffney MD     • levofloxacin  250 mg Oral Q24H Ramonita Julian PA-C     • lidocaine  2 patch Topical HS Sampson Gaffney MD     • magnesium Oxide  400 mg Oral BID Sampson Gaffney MD     • melatonin  3 mg Oral HS Sampson Gaffney MD     • metoprolol succinate  25 mg Oral Daily LAURA Dobbs     • morphine  7.5 mg Oral TID PRN Eleni Cox MD     • ondansetron  4 mg Oral Q6H PRN Sampson Gaffney MD     • pantoprazole  40 mg Oral Early Morning Sampson Gaffney MD     • polyethylene glycol  17 g Oral Daily PRN LAURA Rutledge     • polyethylene glycol  17 g Oral Once LAURA Castaneda     • saliva substitute  5 spray Mouth/Throat 4x Daily LAURA Rider     • senna  1 tablet Oral BID Eleni Cox MD     • simethicone  80 mg Oral 4x Daily (with meals and at bedtime) LAURA Rider         Subjective/ HPI: Patient seen and examined. Patients overnight issues or events were reviewed with nursing or staff during rounds or morning huddle session. New or overnight issues include the following:     No new or overnight issues.   Offers no complaints    ROS:   A 10 point ROS was performed; negative except as noted above. *Labs /Radiology studies reviewed  *Medications reviewed and reconciled as needed  *Please refer to order section for additional ordered labs studies  *Case discussed with primary attending during morning huddle case rounds    Physical Examination:  Vitals:   Vitals:    07/24/23 1442 07/24/23 2006 07/25/23 0452 07/25/23 0600   BP: 92/60 99/57 94/53    BP Location: Right arm Right arm Right arm    Pulse: 97 95 92    Resp: 18 17 18    Temp: 97.5 °F (36.4 °C) 98.8 °F (37.1 °C) 97.5 °F (36.4 °C)    TempSrc: Axillary Axillary Oral    SpO2: 97% 96% 94%    Weight:   47.7 kg (105 lb 2.6 oz) 47.7 kg (105 lb 2.6 oz)   Height:           General Appearance: no distress, conversive  HEENT: PERRLA, conjuctiva normal; oropharynx clear; mucous membranes moist   Neck:  Supple, normal ROM  Lungs: CTA, normal respiratory effort, no retractions, expiratory effort normal  CV: regular rate and rhythm; no rubs/murmurs/gallops, PMI normal   ABD: soft; ND/NT; +BS  EXT: no edema  Skin: normal turgor, normal texture, no rashes  Psych: affect normal, mood normal  Neuro: AAO      The above physical exam was reviewed and updated as determined by my evaluation of the patient on 7/25/2023. Invasive Devices     None                    VTE Pharmacologic Prophylaxis: Eliquis  Code Status: Level 1 - Full Code  Current Length of Stay: 18 day(s)      Total time spent:  30 minutes with more than 50% spent counseling/coordinating care. Counseling includes discussion with patient re: progress  and discussion with patient of his/her current medical state/information. Coordination of patient's care was performed in conjunction with primary service. Time invested included review of patient's labs, vitals, and management of their comorbidities with continued monitoring. In addition, this patient was discussed with medical team including physician and advanced extenders. The care of the patient was extensively discussed and appropriate treatment plan was formulated unique for this patient. Medical decision making for the day was made by supervising physician unless otherwise noted in their attestation statement. ** Please Note:  voice to text software may have been used in the creation of this document.  Although proof errors in transcription or interpretation are a potential of such software**

## 2023-07-25 NOTE — PROGRESS NOTES
07/25/23 1210   Pain Assessment   Pain Assessment Tool 0-10   Pain Score No Pain   Restrictions/Precautions   Precautions 1:1;Aspiration;Bed/chair alarms;Cognitive; Fall Risk;Impulsive;Contact/isolation;Spinal precautions;Supervision on toilet/commode   Eating   Type of Assistance Needed Set-up / clean-up;Supervision;Verbal cues   Physical Assistance Level No physical assistance   Eating CARE Score 4   Swallow Assessment   Swallow Treatment Assessment   Daily Dysphagia Tx Note     Patient Name: Carol Machuca    KDNORMA Date: 7/25/2023      Current Risks for Dysphagia & Aspiration: Weak voicing; weak cough; dysarthria; general debilitation; new neuro event; brain injury; cognitive deficit; hx neurologic dx; positioning issues     Current Symptoms/Concerns: difficulty chewing; decreased oral intake; hx of dysphagia/aspiration; throat clearing; cough with intake     Current diet:puree/level 1 diet and nectar thick liquids      Premorbid diet::puree/level 1 diet and nectar thick liquids     Positioning: upright in recliner for meal today    Items administered:Consistencies Administered: nectar thick and puree  Materials administered included: puree carrots, mashed potatoes, pureed peaches, pudding, NTL by cup     Total amount of meal consumed:   50% of puree carrots  75% of mashed potatoes  50% of boost pudding  100% of pureed peaches  120cc of NTL by cup      Oral stage:mild-moderate  Lip closure: functional  Anterior spillage: none  Mastication/manipulation: mild-moderately delayed pending dry vs moist purees  Bolus formation: more coordinated today  Bolus control: decreased w/ NT  Transfer: mild-moderate delayed given both puree/NT  Oral residue: none  Pocketing: none         Pharyngeal stage:moderate  Swallow promptness: was observed to be moderately delayed across meal today  Hyolaryngeal elevation: decreased   Wet voice: noted x1   Throat clear: pt eliciting weaker throat clears, needing verbal cues from SLP to elicit stronger throat clear/cough  Cough: x3 after NTL today  Secondary swallows: ongoing given dry purees  Audible swallows: increased today throughout both purees and NTL       Esophageal stage:mild-moderate: pt noted to have reflux and belching near end of meal; SLP providing verbal cues to break to allow for digestion. Summary:     Pt presenting with mild-moderate oral and moderate pharyngeal dysphagia today. Symptoms or concerns included decreased bolus propulsion, decreased mastication, decreased bolus formation, delayed oral intiation and suspected decreased control of NTL   suspected pharyngeal swallow delay, suspected decreased hyolaryngeal elevation upon palpation, suspected pharyngeal residue, multiple swallows, effortful swallow, audible swallows and weaker throat clearing, which SLP prompting for stronger cough. Lip seal around cup, fork, tsp was functional today to where bolus retrieval adequate to where no anterior loss was observed given meal. Pt does continue w/ mild-moderate delay in puree items, to where more time is noted given dry purees vs moister purees/pudding. Bolus formulation was noted to be more coordinated given dry purees to where no increased residual/retention was observed. However, pt does continue to demonstrate fluctuating bolus control given NTL by cup, where larger sips are more decreased vs smaller single sips. Swallow initiation was noted to be delayed given both purees and NTL today, to where once swallow is elicited, excursion remains decreased leading to multiple swallows w/ dry purees>moist purees due to most likely decreased pharyngeal constriction and pharyngeal residual. Pt did have increased audible swallows throughout meal today given both w/ puree and NTL by cup. Pt did present w/ weaker throat clears inconsistently after purees which SLP prompted for pt to elicit more effortful cough response to bring up any extra phlegm.  This occurred x3 during meal to which pt did expel mucous. Pt did have cough x3 after NTL . Near end of meal, pt did pause, placing hand to throat area and then began to elicit belching which likely is due to ongoing reflux and suspected motility issues. SLP providing education given breaks during meal to allow for foods to push through more before consumption of additional food items. Recommendations:  Diet: puree/level 1 diet and nectar thick liquids  Meds: whole or cut with puree  Strategies: upright posture, only feed when fully alert, slow rate of feeding, small bites/sips, effortful swallow, quiet environment (tv off, limit talking, door closed, etc.) and alternating bites and sips     FULL supervision w/ meals. Results reviewed with: patient, RN, 1:1 sitter  Aspiration precautions posted. F/u ST tx: Pt is currently recommended for further skilled SLP services targeting dysphagia therapy in order to maximize oral and pharyngeal swallow skills, while safely supporting PO intake, as well as to improve independent carryover of safe swallow strategies. Plan: Cont to monitor pt on current diet              Trial upgraded textures of dysphagia level 2 and thin liquids as appropriate (consider trialing IDDSI Level 1- slightly thick liquid)-->focus will be towards therapeutic means vs overall diet advancement              Will benefit from f/u with GI team which pt follows with [facility] given esophageal concerns. Cont education on puree diet and NTL with strategies for at home (education folder started in room with handouts)   Swallow Assessment Prognosis   Prognosis Fair   Prognosis Considerations Age; Co-morbidities; Family/community support;Medical diagnosis; Medical prognosis;Previous level of function;Severity of impairments;New learning ability;Ability to carry over   SLP Therapy Minutes   SLP Time In 1210   SLP Time Out 1240   SLP Total Time (minutes) 30   SLP Mode of treatment - Individual (minutes) 30   SLP Mode of treatment - Concurrent (minutes) 0   SLP Mode of treatment - Group (minutes) 0   SLP Mode of treatment - Co-treat (minutes) 0   SLP Mode of Treatment - Total time(minutes) 30 minutes   SLP Cumulative Minutes 620   Therapy Time missed   Time missed?  No

## 2023-07-25 NOTE — PLAN OF CARE
Problem: PAIN - ADULT  Goal: Verbalizes/displays adequate comfort level or baseline comfort level  Description: Interventions:  - Encourage patient to monitor pain and request assistance  - Assess pain using appropriate pain scale  - Administer analgesics based on type and severity of pain and evaluate response  - Implement non-pharmacological measures as appropriate and evaluate response  - Consider cultural and social influences on pain and pain management  - Notify physician/advanced practitioner if interventions unsuccessful or patient reports new pain  Outcome: Progressing     Problem: INFECTION - ADULT  Goal: Absence or prevention of progression during hospitalization  Description: INTERVENTIONS:  - Assess and monitor for signs and symptoms of infection  - Monitor lab/diagnostic results  - Monitor all insertion sites, i.e. indwelling lines, tubes, and drains  - Monitor endotracheal if appropriate and nasal secretions for changes in amount and color  - [place] appropriate cooling/warming therapies per order  - Administer medications as ordered  - Instruct and encourage patient and family to use good hand hygiene technique  - Identify and instruct in appropriate isolation precautions for identified infection/condition  Outcome: Progressing  Goal: Absence of fever/infection during neutropenic period  Description: INTERVENTIONS:  - Monitor WBC    Outcome: Progressing     Problem: SAFETY ADULT  Goal: Patient will remain free of falls  Description: INTERVENTIONS:  - Educate patient/family on patient safety including physical limitations  - Instruct patient to call for assistance with activity   - Consult OT/PT to assist with strengthening/mobility   - Keep Call bell within reach  - Keep bed low and locked with side rails adjusted as appropriate  - Keep care items and personal belongings within reach  - Initiate and maintain comfort rounds  - Make Fall Risk Sign visible to staff  - Offer Toiletingours, in advance of need  - Initiate/Main  - Obtain necessary fall risk   - Apply yellow socks and bracelet for high fall risk patients  - Consider moving patient to room near nurses station  Outcome: Progressing  Goal: Maintain or return to baseline ADL function  Description: INTERVENTIONS:  -  Assess patient's ability to carry out ADLs; assess patient's baseline for ADL function and identify physical deficits which impact ability to perform ADLs (bathing, care of mouth/teeth, toileting, grooming, dressing, etc.)  - Assess/evaluate cause of self-care deficits   - Assess range of motion  - Assess patient's mobility; develop plan if impaired  - Assess patient's need for assistive devices and provide as appropriate  - Encourage maximum independence but intervene and supervise when necessary  - Involve family in performance of ADLs  - Assess for home care needs following discharge   - Consider OT consult to assist with ADL evaluation and planning for discharge  - Provide patient education as appropriate  Outcome: Progressing  Goal: Maintains/Returns to pre admission functional level  Description: INTERVENTIONS:  - Perform BMAT or MOVE assessment daily.   - Set and communicate daily mobility goal to care team and patient/family/caregiver. - Collaborate with rehabilitation services on mobility goals if consulted  - Perform Range of Motmes a day. - Reposition patieurs.   - Dangle patien  - Stand pa  - Ambul  - Out of bed   - Out of bed for m  - Out of bed for toileting  - Record patient progress and toleration of activity level   Outcome: Progressing     Problem: DISCHARGE PLANNING  Goal: Discharge to home or other facility with appropriate resources  Description: INTERVENTIONS:  - Identify barriers to discharge w/patient and caregiver  - Arrange for needed discharge resources and transportation as appropriate  - Identify discharge learning needs (meds, wound care, etc.)  - Arrange for interpretive services to assist at discharge as needed  - Refer to Case Management Department for coordinating discharge planning if the patient needs post-hospital services based on physician/advanced practitioner order or complex needs related to functional status, cognitive ability, or social support system  Outcome: Progressing     Problem: Prexisting or High Potential for Compromised Skin Integrity  Goal: Skin integrity is maintained or improved  Description: INTERVENTIONS:  - Identify patients at risk for skin breakdown  - Assess and monitor skin integrity  - Assess and monitor nutrition and hydration status  - Monitor labs   - Assess for incontinence   - Turn and reposition patient  - Assist with mobility/ambulation  - Relieve pressure over bony prominences  - Avoid friction and shearing  - Provide appropriate hygiene as needed including keeping skin clean and dry  - Evaluate need for skin moisturizer/barrier cream  - Collaborate with interdisciplinary team   - Patient/family teaching  - Consider wound care consult   Outcome: Progressing     Problem: Nutrition/Hydration-ADULT  Goal: Nutrient/Hydration intake appropriate for improving, restoring or maintaining nutritional needs  Description: Monitor and assess patient's nutrition/hydration status for malnutrition. Collaborate with interdisciplinary team and initiate plan and interventions as ordered. Monitor patient's weight and dietary intake as ordered or per policy. Utilize nutrition screening tool and intervene as necessary. Determine patient's food preferences and provide high-protein, high-caloric foods as appropriate.      INTERVENTIONS:  - Monitor oral intake, urinary output, labs, and treatment plans  - Assess nutrition and hydration status and recommend course of action  - Evaluate amount of meals eaten  - Assist patient with eating if necessary   - Allow adequate time for meals  - Recommend/ encourage appropriate diets, oral nutritional supplements, and vitamin/mineral supplements  - Order, calculate, and assess calorie counts as needed  - Recommend, monitor, and adjust tube feedings and TPN/PPN based on assessed needs  - Assess need for intravenous fluids  - Provide specific nutrition/hydration education as appropriate  - Include patient/family/caregiver in decisions related to nutrition  Outcome: Progressing     Problem: MOBILITY - ADULT  Goal: Maintain or return to baseline ADL function  Description: INTERVENTIONS:  -  Assess patient's ability to carry out ADLs; assess patient's baseline for ADL function and identify physical deficits which impact ability to perform ADLs (bathing, care of mouth/teeth, toileting, grooming, dressing, etc.)  - Assess/evaluate cause of self-care deficits   - Assess range of motion  - Assess patient's mobility; develop plan if impaired  - Assess patient's need for assistive devices and provide as appropriate  - Encourage maximum independence but intervene and supervise when necessary  - Involve family in performance of ADLs  - Assess for home care needs following discharge   - Consider OT consult to assist with ADL evaluation and planning for discharge  - Provide patient education as appropriate  Outcome: Progressing  Goal: Maintains/Returns to pre admission functional level  Description: INTERVENTIONS:  - Perform BMAT or MOVE assessment daily.   - Set and communicate daily mobility goal to care team and patient/family/caregiver. - Collaborate with rehabilitation services on mobility goals if consulted  - Perform Ranges a day. - Reposition s.  - Dangle patien  - Stand luis  - Ambulate patie  - Out of bed to c  - Out of bed  - Out of bed for toileting  - Record patient progress and toleration of activity level   Outcome: Progressing     Problem: Nutrition/Hydration-ADULT  Goal: Nutrient/Hydration intake appropriate for improving, restoring or maintaining nutritional needs  Description: Monitor and assess patient's nutrition/hydration status for malnutrition. Collaborate with interdisciplinary team and initiate plan and interventions as ordered. Monitor patient's weight and dietary intake as ordered or per policy. Utilize nutrition screening tool and intervene as necessary. Determine patient's food preferences and provide high-protein, high-caloric foods as appropriate.      INTERVENTIONS:  - Monitor oral intake, urinary output, labs, and treatment plans  - Assess nutrition and hydration status and recommend course of action  - Evaluate amount of meals eaten  - Assist patient with eating if necessary   - Allow adequate time for meals  - Recommend/ encourage appropriate diets, oral nutritional supplements, and vitamin/mineral supplements  - Order, calculate, and assess calorie counts as needed  - Recommend, monitor, and adjust tube feedings and TPN/PPN based on assessed needs  - Assess need for intravenous fluids  - Provide specific nutrition/hydration education as appropriate  - Include patient/family/caregiver in decisions related to nutrition  Outcome: Progressing

## 2023-07-25 NOTE — PROGRESS NOTES
PM&R PROGRESS NOTE:  Bay Jones 70 y.o. female MRN: [id number]  Unit/Bed#: -01 Encounter: [id number]    Rehabilitation Diagnosis: Impairment of mobility, safety, Activities of Daily Living (ADLs), and cognitive/communication skills due to Other Disabling Impairments:  13  Other Disabling Impairments    HPI: Bay Jones is a 70 y.o. female with medical history of HTN, CVA in 2/2023 on eliquis (chronic dysphagia, L sided weakness, cognitive impairment), hypercalcemia,  CHF, GERD, SHAR, gout, bipolar disorder, RA on plaquenil, chronic opioid dependence on morphine for chronic back/joint pain, who presented to the [address] [place] [address] [address] on 6/30 after a fall, unable to get up and disoriented. She thinks she tripped and fell down her porch steps, landing on he L thigh. Found to have a L lateral thigh hematoma with evidence of extravasation on imaging. Binder placed over hips, and eliquis was held but not reversed given her recent stroke. Speech consulted and noted oropharyngeal swallow likely at baseline and recommended Level 1/NTL. Recommended for VBS. Course c/b ABLA on 7/2 Hgb dropped to 6.8 and she was given 1 unit pRBC. Geriatrics was consulted, noted at baseline she is oriented but forgetful, with worsening issues since her stroke. Mini-Cog was 9/15 in 2021. They recommended checking B12. They noted she has not been on lorazepam since January, and also recommended weaning down off gabapentin if possible. On 7/4 patient had an unwitnessed fall while trying to reach for her bag on the window sill. No head strike, but landed on her L hip. GCS 15, Neuro exam was baseline. CTA ordered to look for active bleeding, which showed decreased size of hematoma with no active extravasation. Hgb did drop after her transfusion, but seems to have stablized today at 8.5.  That being said, her HR was elevated today, and it appears a troponin has been ordered by the trauma team as well as a CXR. CXR showed hazy RLL opacity, likely 2/2 atelectasis, and trops trended down. Metoprolol was increased with improvement. She was admitted to the [facility] on 7/7. SUBJECTIVE: Patient seen face to face, seated in wheelchair with TLSO brace. Patient says brace is tight, assessed, fit is appropriate. Slept well, thoracic spine pain is constant, current analgesics effective in reducing pain, current 6/10, nursing notified to administer medications. Patient feels more awake, alert today, ready to participate in therapies. Discussed urology recommendations and follow-up with outpatient Urogyn. Appetite at baseline, completing dietary supplements. Voiding, denies dysuria, new discharge, LBM 7/24. Denies chest pain, shortness of breath, fever, chills, N/V, abdominal pain. ASSESSMENT: Stable, progressing    PLAN:  - T12 fracture: TLSO when OOB or >45 degrees, spinal precautions, follow-up with NS in 2 weeks  - UTI: continue Levaquin, follow-up with outpatient Urogyn, [facility]  - right shoulder pain: possible impingement, continue Voltaren gel neck through shoulder    Rehabilitation  • Functional deficits:  cognitive impairment, self-care, impaired mobility  • Continue current rehabilitation plan of care to maximize function.    • Functional update:   • PT: mobility- min -mod A, transfers- min A, ambulation- total A: min A with wheelchair follow 45'  • OT: ADL: bathing- mod-max A, dressing- mod-max A, footwear- max A, toileting- min A  • SLP: dysphagia puree/NTL, impaired cognition, decreased memory, decreased executive function, decreased insight and attention  • Estimated Discharge: anticipated end of week vs next week with home PT/OT    Pain  • Tylenol 975 mg every 8 hours  • Lidocaine patch  • Gabapentin 400 mg BID  • Diclofenac Sodium topical gel QID prn  • Morphine Sulfate 7.5 mg TID prn    DVT prophylaxis  • Eliquis    Bladder plan  • Continent    Bowel plan  • Continent  • Miralax daily prn      * Leg hematoma, left, initial encounter  Assessment & Plan  2/2 fall    6/30 CT CAP:  Large hematoma in the lateral left thigh measuring up to 9.6 cm with focus of active contrast extravasation. No acute intrathoracic or intra-abdominal injury. 7/4 CTA Pelvis:  1. Decreasing size of left lateral thigh hematoma without evidence for active extravasation. 2.  No CT findings in the visualized pelvis and thighs to explain falling hematocrit. 3.  Pancolitis with increasing inflammatory free fluid in the pelvis. No free air, pneumatosis, or encapsulated collections demonstrated. Non-operatively managed. Did require transfusion x1  Pain management as below  Monitor Hgb. PT/OT 3-5 hours/day, 5-7 days/week  Outpatient f/u with General Surgery      History of stroke  Assessment & Plan  MRI brain 2/22 - 1.  New small acute /more recent interval lacunar infarction in the right cerebral peduncle (series 4, image 14). 2.  Extensive multifocal large, previously present right middle cerebral artery infarction which is evolving with superimposed areas of evolving hemorrhage likely related to hemorrhagic transformation of subacute right MCA infarction.  No significant   mass effect. 3.  Mild, chronic microangiopathy. S/P thrombectomy 2/12 by Dr Janae Maldonado   Secondary stroke prophylaxis at this time with Eliquis 5 mg twice daily   PT, OT, SLP. Review modifiable risk factors and provide stroke education  Monitor for poststroke pain  Eliquis for secondary stroke prophylaxis    Outpatient f/u with Neurology    Fall  Assessment & Plan  - s/p slip fall onto buttocks 7/19  - did not ask for assistance  - Now with bed alarms and 1:1 after failing frequent checks.  - CTH, CT C-spine without acute findings, XR Hip/Pelvis without acute findings  - 2 days later with back pain and found to have possible new wedging of T12 - age indeterminate without fracture line  - Pain control as detailed below.   - neurosurgery recommend- thoracic spine precautions,TLSO brace, repeat TL XR with follow-up in 2 weeks      CHF (congestive heart failure) ([facility])  Assessment & Plan  Wt Readings from Last 3 Encounters:   07/25/23 47.7 kg (105 lb 2.6 oz)   06/30/23 55.3 kg (122 lb)   06/06/23 47.9 kg (105 lb 9.6 oz)     2/2023 Echo 30% with G2DD, moderate AI  Home: Toprol XL 25mg daily, Aldactone 25mg daily   - Limited by BP - no ACE/ARB   - no SGLT2i due to recurrent UTI  Here: Toprol XL 25mg daily  Unclear etiology of cardiomyopathy  Possible consideration for ischemic work-up outpatient, poor candidate for aggressive procedures   Monitor I/O/daily weights, volume status, lytes. IM consulted to assist with management  Outpatient f/u with Dr. Colleen Irving      Dysphagia  Assessment & Plan  2/2 previous CVA earlier this year. Noted to have impaired oropharyngeal swallow  Was on free water protocol at home - held initially due to thrush - which has been treated and is resolved. On Level 1/NTL  - Inpatient SLP recommended VBS  - Consulting SLP here - they did not recommend VBS at this time. - Aspiration precautions  - Strict oral hygiene prior to trials  - esophagogram scheduled for 7/19, unable to complete d/t Aspiration risk    Urinary tract infection without hematuria  Assessment & Plan  Present on admission  With incontinence and confusion  Grew 100k CFU Enterococcus    - Started on Macrobid based on antibiogram on 7/9   - sensitivity confirms susceptibility   - improved symptoms of UTI  Unfortunately confusion and repeat UTI symptoms on 7/20  - 7/20 reports burning with urination, awaiting UA w/reflex, WBC 10-20   - 7/21 - urine culture + > 100K Enterococcus faecalis, started Levaquin  - Would treat for 7 days.  - Urology consulted - no further recs. May be a candidate for prophylactic antibiotics  - Does follow with Urogynecology at [facility] - last seen in 2020.  - Will need re-referral at discharge.       Mixed stress and urge urinary incontinence  Assessment & Plan  Frankly incontinent in the hospital  History of pubovaginal sling with [facility]  Initiate timed voids and scans   - PVRs have all been low   - Timed voids help significantly with continence. Has history of recurrent UTIs - see that entry for details  Close continence care      Anemia  Assessment & Plan  2/2 L leg hematoma (most recent imaging negative for extravasation)  At home on eliquis  No reversal inpatient due to recent CVA  Prior to admission Hgb 10-12. Ravi 7/2 6.8 - given 1 unit pRBC  7/24 Hgb 8.5 > 9.2 > 9.3 > 10.1 > 9.9 > 10.9 > 9.6 > 9.5  Monitor, transfuse as appropriate. IM consulted to assist with management    Cervical dystonia  Assessment & Plan  Recommend outpatient f/u with Dr. Dao Leone to evaluate for botulinum toxin injections  Appears to have a retrocollis. Sundowning  Assessment & Plan  Does wake up confused at night, but is easily redirectable  - More confusion, than agitation  - Wandered all the way down to the cafeteria on the 1st floor on 7/9  - No injuries  - Brought back to her room  - Found to have a UTI on admission and treated with Macrobid. - Initially was able to wean down from Virtual 1:1 to Frequent Checks. - Unfortunately on 7/19 had another fall with some confusion   - Found to have another UTI and started on enterococcus   - Now on 1:1 indefinitely. - Floor mats. - Delirium precautions    Night terrors  Assessment & Plan  Home: Prazosin  Here: none - held due to BP  Continue melatonin - so far tolerating. Outpatient f/u with PCP    Hypomagnesemia  Assessment & Plan  Home: Mag oxide 800mg BID  Here: Mag oxide 400mg BID  7/9 Mag 1.9   Goals > 2. Sinus tachycardia  Assessment & Plan  Home: Toprol XL 25mg BID  Here: Toprol XL 25mg daily  On 7/6 with HR in 115-120. EKG showed sinus tach. Trops trended and negative. - Associated with transient/mild SOB   - She was only being given Toprol daily in the hospital   - Increased to BID with improvement.  Had to be decreased eventually as was not meeting parameters. Lytes with K > 4, Mag > 2  Monitor and adjust regimen as appropriate  IM consulted to assist with management  Outpatient f/u with Dr. Lizzy Fabian    Obstructive sleep apnea  Assessment & Plan  Previously non-compliant with hospital or home CPAP  Outpatient f/u with PCP. Hypercalcemia  Assessment & Plan  Suspect 2/2 primary hyperparathyroidism  7/8 iCal 1.12, Phos 3.3  Home: Sensipar 30mg daily  Here: Same  Recommend outpatient f/u with Endocrinology      HTN (hypertension)  Assessment & Plan  Tends to run on the low/normal side. Home: Toprol XL 25mg daily  Here: Same  Has not been able to get meds at times due to not meeting parameters. Monitor and adjust as appropriate  - Spironolactone held 7/15 and d/c 7/16  IM consulted to assist with management    Bipolar depression [facility])  Assessment & Plan  Home: Bupropion 100mg BID, Cymbalta 60mg BID, Ativan 0.25mg BID PRN (however this has not been filled since January 2023 as per PDMP)  Here: Bupropion 100mg BID, Cymbalta 60mg BID  Consulting rehab psychology for support  Adjust as appropriate  Outpatient f/u with PCP    Severe protein-calorie malnutrition ([address])  Assessment & Plan  Malnutrition Findings:                                 BMI Findings: Body mass index is 20.89 kg/m². Consulted nutrition    Osteoporosis  Assessment & Plan  Hx L1 compression fx s/p kyphoplasty  Has multiple compression fractures noted on Thoracic spine imaging   - And possibly new anterior wedging of T12 on most recent imaging. Was previously on Prolia - last given in 2020   - Held due to dental issues  Recommend outpatient f/u with Endocrinology  2/23 Vitamin D level is 41.7    Gastroesophageal reflux disease without esophagitis  Assessment & Plan  Home: PPI daily  Here: PPI daily    Seronegative arthropathy of multiple sites [facility])  Assessment & Plan  Follows with Dr. Maribeth Gaines with Rheum and is on Hydroxychloroquine 200mg BID.   Continue home regimen and follow-up with rheumatology on discharge    Mild intermittent asthma without complication  Assessment & Plan  No acute exacerbation  Home: Albuterol PRN  Here: Same    Chronic pain syndrome  Assessment & Plan  Joint and back pain  Follows with Dr. Ana Monroe with [facility]  Reviewed PDMP - at home on MSIR 7.5mg TID PRN. Here: MSIR 7.5mg TID PRN  - Outpatient f/u with Dr. Ana Monroe. Thrush-resolved as of 7/24/2023  Assessment & Plan  Noted on admission. Now resolved  7/7 started on nystatin swish/spit  - 7/11 improving  - Continued for at least 7 days with 2-3 days after her antibiotics stop    Appreciate IM consultants medical co-management. Labs, medications, and imaging personally reviewed. ROS:  Review of Systems   A 10 point review of systems was negative except for what is noted in the HPI. OBJECTIVE:   BP 94/53 (BP Location: Right arm)   Pulse 92   Temp 97.5 °F (36.4 °C) (Oral)   Resp 18   Ht 5' 3" (1.6 m)   Wt 47.7 kg (105 lb 2.6 oz)   SpO2 94%   BMI 18.63 kg/m²     Physical Exam  Constitutional:       Appearance: Normal appearance. HENT:      Head: Normocephalic and atraumatic. Nose: Nose normal.      Mouth/Throat:      Mouth: Mucous membranes are dry. Cardiovascular:      Rate and Rhythm: Normal rate and regular rhythm. Pulses: Normal pulses. Heart sounds: Normal heart sounds. Pulmonary:      Effort: Pulmonary effort is normal.      Breath sounds: Normal breath sounds. Abdominal:      General: Bowel sounds are normal.      Palpations: Abdomen is soft. Musculoskeletal:         General: Deformity and signs of injury present. Normal range of motion. Cervical back: Normal range of motion. Comments: + kyphosis, +TLSO   Skin:     General: Skin is warm and dry. Capillary Refill: Capillary refill takes less than 2 seconds. Findings: Bruising present. Neurological:      Mental Status: She is alert and oriented to person, place, and time.       Motor: Weakness present.       Gait: Gait abnormal.   Psychiatric:         Mood and Affect: Mood normal.        Lab Results   Component Value Date    WBC 4.28 (L) 07/24/2023    HGB 9.5 (L) 07/24/2023    HCT 29.7 (L) 07/24/2023    MCV 99 (H) 07/24/2023     (L) 07/24/2023     Lab Results   Component Value Date    SODIUM 144 07/24/2023    K 3.7 07/24/2023     (H) 07/24/2023    CO2 28 07/24/2023    BUN 13 07/24/2023    CREATININE 0.76 07/24/2023    GLUC 87 07/24/2023    CALCIUM 8.3 07/24/2023     Lab Results   Component Value Date    INR 1.33 (H) 07/01/2023    INR 1.65 (H) 06/30/2023    INR 1.66 (H) 04/29/2023    PROTIME 16.7 (H) 07/01/2023    PROTIME 19.5 (H) 06/30/2023    PROTIME 19.9 (H) 04/29/2023           Current Facility-Administered Medications:   •  acetaminophen (TYLENOL) tablet 650 mg, 650 mg, Oral, Q6H [address], Nishi Rojas MD, 650 mg at 07/25/23 0507  •  albuterol (PROVENTIL HFA,VENTOLIN HFA) inhaler 2 puff, 2 puff, Inhalation, Q6H PRN, Nishi Rojas MD, 2 puff at 07/25/23 0507  •  apixaban (ELIQUIS) tablet 5 mg, 5 mg, Oral, BID, Nishi Rojas MD, 5 mg at 07/25/23 2877  •  buPROPion [facility]) tablet 100 mg, 100 mg, Oral, BID, Nishi Rojas MD, 100 mg at 07/25/23 0930  •  cinacalcet (SENSIPAR) tablet 30 mg, 30 mg, Oral, Daily With Breakfast, Nishi Rojas MD, 30 mg at 07/25/23 0930  •  Diclofenac Sodium (VOLTAREN) 1 % topical gel 2 g, 2 g, Topical, 4x Daily, Nishi Rojas MD, 2 g at 07/24/23 2105  •  docusate sodium (COLACE) capsule 100 mg, 100 mg, Oral, BID, Margi Joseph MD, 100 mg at 07/25/23 8337  •  DULoxetine (CYMBALTA) delayed release capsule 60 mg, 60 mg, Oral, BID, Nishi Rojas MD, 60 mg at 07/25/23 4719  •  gabapentin (NEURONTIN) capsule 300 mg, 300 mg, Oral, BID, Margi Joseph MD, 300 mg at 07/25/23 5818  •  hydroxychloroquine (PLAQUENIL) tablet 200 mg, 200 mg, Oral, BID With Meals, Nishi Rojas MD, 200 mg at 07/25/23 0952  •  lamoTRIgine (LaMICtal) tablet 150 mg, 150 mg, Oral, HS, Elizabeth Jeffrey MD, 150 mg at 07/24/23 2103  •  levofloxacin (LEVAQUIN) tablet 250 mg, 250 mg, Oral, Q24H, Ramonita Julian PA-C, 250 mg at 07/24/23 1316  •  lidocaine (LIDODERM) 5 % patch 2 patch, 2 patch, Topical, HS, Elizabeth Jeffrey MD, 2 patch at 07/24/23 2103  •  magnesium Oxide (MAG-OX) tablet 400 mg, 400 mg, Oral, BID, Elizabeth Jeffrey MD, 400 mg at 07/25/23 3318  •  melatonin tablet 3 mg, 3 mg, Oral, HS, Elizabeth Jeffrey MD, 3 mg at 07/24/23 2103  •  metoprolol succinate (TOPROL-XL) 24 hr tablet 25 mg, 25 mg, Oral, Daily, LAURA Sears, 25 mg at 07/24/23 1297  •  morphine (MSIR) IR tablet 7.5 mg, 7.5 mg, Oral, TID PRN, Racheal Davis MD, 7.5 mg at 07/25/23 5491  •  ondansetron (ZOFRAN-ODT) dispersible tablet 4 mg, 4 mg, Oral, Q6H PRN, Elizabeth Jeffrey MD, 4 mg at 07/23/23 0504  •  pantoprazole (PROTONIX) EC tablet 40 mg, 40 mg, Oral, Early Morning, Elizabeth Jeffrey MD, 40 mg at 07/25/23 0507  •  polyethylene glycol (MIRALAX) packet 17 g, 17 g, Oral, Daily PRN, LAURA Rutledge, 17 g at 07/17/23 0934  •  polyethylene glycol (MIRALAX) packet 17 g, 17 g, Oral, Once, LAURA Rutledge  •  saliva substitute (MOUTH KOTE) mucosal solution 5 spray, 5 spray, Mouth/Throat, 4x Daily, LAURA Jaimes, 5 spray at 07/25/23 0932  •  senna (SENOKOT) tablet 8.6 mg, 1 tablet, Oral, BID, Racheal Davis MD, 8.6 mg at 07/25/23 6301  •  simethicone (MYLICON) chewable tablet 80 mg, 80 mg, Oral, 4x Daily (with meals and at bedtime), LAURA Villalba, 80 mg at 07/25/23 2201    Past Medical History:   Diagnosis Date   • Anxiety    • Asthma    • Bipolar depression ([address])    • Chronic narcotic dependence (HCC)    • Chronic pain    • Depression    • Enterovirus heart infection    • SHAR (obstructive sleep apnea)    • Osteoarthritis    • Peripheral neuropathy    • Plantar fasciitis of right foot    • Senile osteoporosis    • Seronegative arthropathy of multiple sites ([address]) • Undifferentiated connective tissue disease [facility])        Patient Active Problem List    Diagnosis Date Noted   • Leg hematoma, left, initial encounter 07/01/2023   • History of stroke 02/28/2023   • Fall 03/02/2022   • CHF (congestive heart failure) ([address])    • Dysphagia 03/09/2023   • Urinary tract infection without hematuria 03/06/2023   • Mixed stress and urge urinary incontinence    • Anemia 06/26/2021   • Cervical dystonia 07/11/2023   • Sundowning 07/09/2023   • Night terrors 07/07/2023   • Cerebral aneurysm, nonruptured 05/15/2023   • Nocturnal hypoxia 05/01/2023   • Stroke-like symptoms 05/01/2023   • Insomnia 04/03/2023   • Hypomagnesemia 03/30/2023   • Sinus tachycardia 03/29/2023   • Hypokalemia 03/10/2023   • AMS (altered mental status) 03/09/2023   • Gout of foot 03/09/2023   • Wound of left ankle 03/09/2023   • Obstructive sleep apnea 02/28/2023   • History of bacteremia 02/24/2023   • Acute CVA (cerebrovascular accident) ([address]) 02/23/2023   • Hypercalcemia 02/22/2023   • Discoloration of skin of foot 02/22/2023   • Abdominal pain 02/22/2023   • HFrEF (heart failure with reduced ejection fraction) ([address]) 02/13/2023   • Stroke ([address]) 02/12/2023   • HTN (hypertension) 02/12/2023   • Cardiomyopathy ([address]) 02/07/2023   • Vitamin D insufficiency 02/07/2023   • LAILA (acute kidney injury) ([address]) 04/23/2022   • Polypharmacy 04/23/2022   • 'light-for-dates' infant with signs of fetal malnutrition 04/22/2022   • Bipolar depression ([address]) 03/04/2022   • Rheumatoid arthritis of multiple sites with negative rheumatoid factor ([address]) 03/04/2022   • Closed fracture of multiple ribs of right side 03/02/2022   • Traumatic pneumothorax 03/02/2022   • Closed fracture of transverse process of lumbar vertebra ([address]) 03/02/2022   • Liver contusion 03/02/2022   • Acute pain due to trauma 03/02/2022   • Right shoulder pain 03/02/2022   • Severe protein-calorie malnutrition ([address]) 03/02/2022   • Undifferentiated connective tissue disease ([address]) 12/29/2021   • Primary generalized (osteo)arthritis 12/29/2021   • Peripheral neuropathy 12/29/2021   • Osteoporosis 12/29/2021   • Hx of compression fracture of spine 12/29/2021   • Lumbar spondylosis 12/29/2021   • Cervical spondylosis 12/29/2021   • Chronic pain syndrome    • Mild intermittent asthma without complication    • Ambulatory dysfunction    • Seronegative arthropathy of multiple sites ([address])    • Gastroesophageal reflux disease without esophagitis         LAURA Davis  Physical Medicine and [place]

## 2023-07-25 NOTE — PROGRESS NOTES
07/25/23 0700   Pain Assessment   Pain Assessment Tool 0-10   Pain Score 5   Pain Location/Orientation Location: Back   Restrictions/Precautions   Precautions 1:1;Aspiration;Bed/chair alarms;Cognitive; Fall Risk;Impulsive;Pain;Spinal precautions;Supervision on toilet/commode;Visual deficit   Weight Bearing Restrictions No   ROM Restrictions Yes  (spinal precautions)   Braces or Orthoses TLSO   Lifestyle   Autonomy "there's six dollars for you for the sandwhiches you brought in"   Eating   Type of Assistance Needed Supervision   Physical Assistance Level No physical assistance   Comment PCA present for breakfast meal as pt continues to require full supervision   Eating CARE Score 4   Oral Hygiene   Comment requested to complete post breakfast   Shower/Bathe Self   Type of Assistance Needed Physical assistance;Verbal cues   Physical Assistance Level 51%-75%   Comment seated EOB to complete sponge bathing routine with VCs to adhere to spinal precautions. pt able to bathe UB with supervision, santiago/rear in stance with CG/min A for balance and assist for feet. Shower/Bathe Self CARE Score 2   Upper Body Dressing   Type of Assistance Needed Physical assistance   Physical Assistance Level 76% or more   Comment pt able to don over head shirt with supervision, assist for bra. TA for donning TLSO   Upper Body Dressing CARE Score 2   Lower Body Dressing   Type of Assistance Needed Physical assistance;Verbal cues; Adaptive equipment   Physical Assistance Level 51%-75%   Comment seated EOB, pt uses reacher to assist in threading underwear/pants over feet. pt noted with increased difficulty wtih threading pants vs underwear as "there is more material". pt requires direct supervision and VCS to adhere to spinal precautions. pt stands with Cg/min A during CM with assist to ensure fully up over L hip.    Lower Body Dressing CARE Score 2   Putting On/Taking Off Footwear   Type of Assistance Needed Physical assistance   Physical Assistance Level 76% or more   Comment pt able to use reacher to doff socks, due to time constraints, requires assist to don socks   Putting On/Taking Off Footwear CARE Score 2   Sit to Stand   Type of Assistance Needed Physical assistance;Verbal cues   Physical Assistance Level 25% or less   Comment CG/min A with RW and VCs for hand placement. Sit to Stand CARE Score 3   Bed-Chair Transfer   Type of Assistance Needed Physical assistance   Physical Assistance Level 25% or less   Comment CG/min A with RW   Chair/Bed-to-Chair Transfer CARE Score 3   Cognition   Overall Cognitive Status Impaired   Arousal/Participation Alert; Cooperative   Attention Attends with cues to redirect   Orientation Level Oriented X4   Memory Decreased short term memory   Following Commands Follows one step commands with increased time or repetition   Activity Tolerance   Activity Tolerance Patient tolerated treatment well   Assessment   Treatment Assessment pt engages in 90 minute skilled OT Session focusing on sponge bathing routine, dressing with LHAE, func transfers. see above for full func details. pt able to recall 3/3 spinal precautions, requires VCs for adhering to them during func tasks. pt demo's fair carryover of use of LHR during LE dressing, however does still require to use successfully. pt remains CG/min A for STS and short distance transfers with RW and VCs for overall RW safety. recommend continued skilled care to focus on ADL retraining, func transfers with RW, standing karishma/bal, LUE NMR, func cog, in order to decrease burden of care at d/c. Prognosis Fair   Problem List Decreased strength;Decreased range of motion;Decreased endurance; Impaired balance;Decreased mobility; Decreased cognition; Impaired judgement;Decreased safety awareness;Orthopedic restrictions;Pain   Barriers to Discharge Inaccessible home environment;Decreased caregiver support   Plan   Treatment/Interventions ADL retraining;Functional transfer training; Therapeutic exercise; Endurance training;Cognitive reorientation;Patient/family training;Equipment eval/education; Compensatory technique education   OT Therapy Minutes   OT Time In 0700   OT Time Out 0830   OT Total Time (minutes) 90   OT Mode of treatment - Individual (minutes) 90   OT Mode of treatment - Concurrent (minutes) 0   OT Mode of treatment - Group (minutes) 0   OT Mode of treatment - Co-treat (minutes) 0   OT Mode of Treatment - Total time(minutes) 90 minutes   OT Cumulative Minutes 1418   Therapy Time missed   Time missed?  No

## 2023-07-25 NOTE — PROGRESS NOTES
Physical Medicine and Rehabilitation Progress Note  Carol Machuca 70 y.o. female MRN: [id number]  Unit/Bed#: -01 Encounter: [id number]    To Review: Carol Machuca is a 70 y.o. female with medical history of HTN, CVA in 2/2023 on eliquis (chronic dysphagia, L sided weakness, cognitive impairment), hypercalcemia,  CHF, GERD, SHAR, gout, bipolar disorder, RA on plaquenil, chronic opioid dependence on morphine for chronic back/joint pain, who presented to the [address] [place] [address] on 6/30 after a fall, unable to get up and disoriented. She thinks she tripped and fell down her porch steps, landing on he L thigh. Found to have a L lateral thigh hematoma with evidence of extravasation on imaging. Binder placed over hips, and eliquis was held but not reversed given her recent stroke. Speech consulted and noted oropharyngeal swallow likely at baseline and recommended Level 1/NTL. Recommended for VBS. Course c/b ABLA on 7/2 Hgb dropped to 6.8 and she was given 1 unit pRBC. Geriatrics was consulted, noted at baseline she is oriented but forgetful, with worsening issues since her stroke. Mini-Cog was 9/15 in 2021. They recommended checking B12. They noted she has not been on lorazepam since January, and also recommended weaning down off gabapentin if possible. On 7/4 patient had an unwitnessed fall while trying to reach for her bag on the window sill. No head strike, but landed on her L hip. GCS 15, Neuro exam was baseline. CTA ordered to look for active bleeding, which showed decreased size of hematoma with no active extravasation. Hgb did drop after her transfusion, but seems to have stablized today at 8.5. That being said, her HR was elevated today, and it appears a troponin has been ordered by the trauma team as well as a CXR. CXR showed hazy RLL opacity, likely 2/2 atelectasis, and trops trended down. Metoprolol was increased with improvement. She was [facility] to [facility] on 7/7. Chief Complaint: L outer hip and R shoulder pain is better. Interval History/Subjective:  No acute events overnight. Last BM 7/24. She does feel the voltaren gel helps the shoulder, and the lidoderm patch L hip. Denies any new CP, SOB, fevers, chills, N/V, abdominal pain. Overnight, some confusion, but able to be redirected by 1:1    ROS:  A 10 point review of systems was negative except for what is noted in the HPI. Today's Changes:  1. Appreciate Uro recs. 2. 1:1 to continue at this time  3. Continue current management. Did better in therapy yesterday than she did over the weekend. Continue to evaluate progress. Total visit time: 35 minutes, with more than 50% spent counseling/coordinating care. Counseling includes discussion with patient re: progress in therapies, functional issues observed by therapy staff, and discussion with patient regarding their current medical state and wellbeing. Coordination of patient's care was performed in conjunction with Internal Medicine service to monitor patient's labs, vitals, and management of their comorbidities. Assessment/Plan:    * Leg hematoma, left, initial encounter  Assessment & Plan  2/2 fall    6/30 CT CAP:  Large hematoma in the lateral left thigh measuring up to 9.6 cm with focus of active contrast extravasation. No acute intrathoracic or intra-abdominal injury. 7/4 CTA Pelvis:  1. Decreasing size of left lateral thigh hematoma without evidence for active extravasation. 2.  No CT findings in the visualized pelvis and thighs to explain falling hematocrit. 3.  Pancolitis with increasing inflammatory free fluid in the pelvis. No free air, pneumatosis, or encapsulated collections demonstrated. Non-operatively managed. Did require transfusion x1  Pain management as below  Monitor Hgb.   PT/OT 3-5 hours/day, 5-7 days/week  Outpatient f/u with General Surgery      Cervical dystonia  Assessment & Plan  Recommend outpatient f/u with Dr. Sameer Barajas to evaluate for botulinum toxin injections  Appears to have a retrocollis. Sundowning  Assessment & Plan  Does wake up confused at night, but is easily redirectable  - More confusion, than agitation  - Wandered all the way down to the cafeteria on the 1st floor on 7/9  - No injuries  - Brought back to her room  - Found to have a UTI on admission and treated with Macrobid. - Initially was able to wean down from Virtual 1:1 to Frequent Checks. - Unfortunately on 7/19 had another fall with some confusion   - Found to have another UTI and started on enterococcus   - Now on 1:1 indefinitely. - Floor mats. - Delirium precautions    Night terrors  Assessment & Plan  Home: Prazosin  Here: none - held due to BP  Continue melatonin - so far tolerating. Outpatient f/u with PCP    Hypomagnesemia  Assessment & Plan  Home: Mag oxide 800mg BID  Here: Mag oxide 400mg BID  7/9 Mag 1.9   Goals > 2. Sinus tachycardia  Assessment & Plan  Home: Toprol XL 25mg BID  Here: Toprol XL 25mg daily  On 7/6 with HR in 115-120. EKG showed sinus tach. Trops trended and negative. - Associated with transient/mild SOB   - She was only being given Toprol daily in the hospital   - Increased to BID with improvement. Had to be decreased eventually as was not meeting parameters. Lytes with K > 4, Mag > 2  Monitor and adjust regimen as appropriate  IM consulted to assist with management  Outpatient f/u with Dr. Wilian Henriquez    Dysphagia  Assessment & Plan  2/2 previous CVA earlier this year. Noted to have impaired oropharyngeal swallow  Was on free water protocol at home - held initially due to thrush - which has been treated and is resolved. On Level 1/NTL  - Inpatient SLP recommended VBS  - Consulting SLP here - they did not recommend VBS at this time.   - Aspiration precautions  - Strict oral hygiene prior to trials  - esophagogram scheduled for 7/19, unable to complete d/t Aspiration risk    Urinary tract infection without hematuria  Assessment & Plan  Present on admission  With incontinence and confusion  Grew 100k CFU Enterococcus    - Started on Macrobid based on antibiogram on 7/9   - sensitivity confirms susceptibility   - improved symptoms of UTI  Unfortunately confusion and repeat UTI symptoms on 7/20  - 7/20 reports burning with urination, awaiting UA w/reflex, WBC 10-20   - 7/21 - urine culture + > 100K Enterococcus faecalis, started Levaquin  - Would treat for 7 days.  - Urology consulted - no further recs. May be a candidate for prophylactic antibiotics  - Does follow with Urogynecology at [facility] - last seen in 2020.  - Will need re-referral at discharge. History of stroke  Assessment & Plan  MRI brain 2/22 - 1.  New small acute /more recent interval lacunar infarction in the right cerebral peduncle (series 4, image 14). 2.  Extensive multifocal large, previously present right middle cerebral artery infarction which is evolving with superimposed areas of evolving hemorrhage likely related to hemorrhagic transformation of subacute right MCA infarction.  No significant   mass effect. 3.  Mild, chronic microangiopathy. S/P thrombectomy 2/12 by Dr Dona Kauffman   Secondary stroke prophylaxis at this time with Eliquis 5 mg twice daily   PT, OT, SLP. Review modifiable risk factors and provide stroke education  Monitor for poststroke pain  Eliquis for secondary stroke prophylaxis    Outpatient f/u with Neurology    Obstructive sleep apnea  Assessment & Plan  Previously non-compliant with hospital or home CPAP  Outpatient f/u with PCP. Hypercalcemia  Assessment & Plan  Suspect 2/2 primary hyperparathyroidism  7/8 iCal 1.12, Phos 3.3  Home: Sensipar 30mg daily  Here: Same  Recommend outpatient f/u with Endocrinology      HTN (hypertension)  Assessment & Plan  Tends to run on the low/normal side. Home: Toprol XL 25mg daily  Here: Same  Has not been able to get meds at times due to not meeting parameters.    Monitor and adjust as appropriate  - Spironolactone held 7/15 and d/c 7/16  IM consulted to assist with management    Bipolar depression [facility])  Assessment & Plan  Home: Bupropion 100mg BID, Cymbalta 60mg BID, Ativan 0.25mg BID PRN (however this has not been filled since January 2023 as per PDMP)  Here: Bupropion 100mg BID, Cymbalta 60mg BID  Consulting rehab psychology for support  Adjust as appropriate  Outpatient f/u with PCP    Severe protein-calorie malnutrition ([address])  Assessment & Plan  Malnutrition Findings:                                 BMI Findings: Body mass index is 20.89 kg/m². Consulted nutrition    Fall  Assessment & Plan  - s/p slip fall onto buttocks 7/19  - did not ask for assistance  - Now with bed alarms and 1:1 after failing frequent checks.  - CTH, CT C-spine without acute findings, XR Hip/Pelvis without acute findings  - 2 days later with back pain and found to have possible new wedging of T12 - age indeterminate without fracture line  - Pain control as detailed below. - neurosurgery recommend- thoracic spine precautions,TLSO brace, repeat TL XR with follow-up in 2 weeks      Osteoporosis  Assessment & Plan  Hx L1 compression fx s/p kyphoplasty  Has multiple compression fractures noted on Thoracic spine imaging   - And possibly new anterior wedging of T12 on most recent imaging. Was previously on Prolia - last given in 2020   - Held due to dental issues  Recommend outpatient f/u with Endocrinology  2/23 Vitamin D level is 41.7    Anemia  Assessment & Plan  2/2 L leg hematoma (most recent imaging negative for extravasation)  At home on eliquis  No reversal inpatient due to recent CVA  Prior to admission Hgb 10-12. Ravi 7/2 6.8 - given 1 unit pRBC  7/24 Hgb 8.5 > 9.2 > 9.3 > 10.1 > 9.9 > 10.9 > 9.6 > 9.5  Monitor, transfuse as appropriate.   IM consulted to assist with management    CHF (congestive heart failure) ([address])  Assessment & Plan  Wt Readings from Last 3 Encounters:   07/25/23 47.7 kg (105 lb 2.6 oz)   06/30/23 55.3 kg (122 lb)   06/06/23 47.9 kg (105 lb 9.6 oz)     2/2023 Echo 30% with G2DD, moderate AI  Home: Toprol XL 25mg daily, Aldactone 25mg daily   - Limited by BP - no ACE/ARB   - no SGLT2i due to recurrent UTI  Here: Toprol XL 25mg daily  Unclear etiology of cardiomyopathy  Possible consideration for ischemic work-up outpatient, poor candidate for aggressive procedures   Monitor I/O/daily weights, volume status, lytes. IM consulted to assist with management  Outpatient f/u with Dr. Haines Peak      Mixed stress and urge urinary incontinence  Assessment & Plan  Frankly incontinent in the hospital  History of pubovaginal sling with [facility]  Initiate timed voids and scans   - PVRs have all been low   - Timed voids help significantly with continence. Has history of recurrent UTIs - see that entry for details  Close continence care      Gastroesophageal reflux disease without esophagitis  Assessment & Plan  Home: PPI daily  Here: PPI daily    Seronegative arthropathy of multiple sites [facility])  Assessment & Plan  Follows with Dr. Moe Vigil with Rheum and is on Hydroxychloroquine 200mg BID. Continue home regimen and follow-up with rheumatology on discharge    Mild intermittent asthma without complication  Assessment & Plan  No acute exacerbation  Home: Albuterol PRN  Here: Same    Chronic pain syndrome  Assessment & Plan  Joint and back pain  Follows with Dr. Suzanna Litten with [facility]  Reviewed PDMP - at home on MSIR 7.5mg TID PRN. Here: MSIR 7.5mg TID PRN  - Outpatient f/u with Dr. Suzanna Litten. Thrush-resolved as of 7/24/2023  Assessment & Plan  Noted on admission. Now resolved  7/7 started on nystatin swish/spit  - 7/11 improving  - Continued for at least 7 days with 2-3 days after her antibiotics stop      Health Maintenance  #Delirium/Sleep: At high risk. See cognitive impairment above.   #Pain: Tylenol scheduled 650mg Q6hr, Gabapentin 300mg BID (will try to wean down here), Cymbalta 60mg BID, MSIR 7.5mg TID PRN, Lidoderm, Voltaren. #Bowel: Last BM 7/24. PRN bisacodyl suppository/miralax  #Bladder: See incontinence above  #Skin/Pressure Injury Prevention: Turn Q2hr in bed, with weight shifts Y69-71icy in wheelchair. Float heels in bed. #DVT Prophylaxis: Fully anticoagulated on eliquis 5mg BID, SCDs. #GI Prophylaxis: Protonix already for chronic issues. #Code Status: Full Code  #FEN: Level 1/NTL, Ensure HP Pudding- Paiz Cummaquid at Breakfast/Lunch/Dinner  #Dispo: Team 7/12: ADD to be determined now with new compression fracture and pain and currently treating recurrent UTI. She has had a decline in function given her pain and new spinal precautions    Objective:    Functional Update:   PT: Marika-CGA bed mobility, transfers, and ambulation 72' with RW  OT: min-modA ADLs  SLP: Level 1/NTL    Allergies per EMR    Physical Exam:  Temp:  [97.5 °F (36.4 °C)-98.8 °F (37.1 °C)] 97.5 °F (36.4 °C)  HR:  [92-97] 92  Resp:  [17-18] 18  BP: (92-99)/(53-60) 94/53  Oxygen Therapy  SpO2: 94 %  O2 Flow Rate (L/min): 2 L/min    Gen: No acute distress, frail  HEENT: Moist mucus membranes, Normocephalic/Atraumatic  Cardiovascular: Regular rate, rhythm, S1/S2. Distal pulses palpable  Heme/Extr: No edema  Pulmonary: Non-labored breathing. Lungs CTAB  : No lopes  GI: Soft, non-tender, non-distended. BS+  MSK: Retrocollis  Integumentary: Skin is warm, dry  Neuro: AAOx3,Speech is intact. Appropriate to questioning  Psych: Normal mood and affect. Diagnostic Studies: Reviewed, R shoulder XR final read pending.     Laboratory:  Reviewed   Results from last 7 days   Lab Units 07/24/23  0510 07/22/23  0900 07/20/23  0643   HEMOGLOBIN g/dL 9.5* 9.6* 10.9*   HEMATOCRIT % 29.7* 30.4* 36.2   WBC Thousand/uL 4.28* 7.76 4.78     Results from last 7 days   Lab Units 07/24/23  0510 07/22/23  0916 07/20/23  0643   BUN mg/dL 13 16 17   POTASSIUM mmol/L 3.7 4.2 4.1   CHLORIDE mmol/L 111* 110* 111*   CREATININE mg/dL 0.76 0.92 0.89   AST U/L  --  21  --    ALT U/L  --  15  --             Patient Active Problem List   Diagnosis   • Chronic pain syndrome   • Mild intermittent asthma without complication   • Ambulatory dysfunction   • Seronegative arthropathy of multiple sites ([facility])   • Gastroesophageal reflux disease without esophagitis   • Mixed stress and urge urinary incontinence   • CHF (congestive heart failure) ([facility])   • Anemia   • Undifferentiated connective tissue disease ([address])   • Primary generalized (osteo)arthritis   • Peripheral neuropathy   • Osteoporosis   • Hx of compression fracture of spine   • Lumbar spondylosis   • Cervical spondylosis   • Closed fracture of multiple ribs of right side   • Traumatic pneumothorax   • Closed fracture of transverse process of lumbar vertebra [facility])   • Liver contusion   • Fall   • Acute pain due to trauma   • Right shoulder pain   • Severe protein-calorie malnutrition ([facility])   • Bipolar depression ([facility])   • Rheumatoid arthritis of multiple sites with negative rheumatoid factor ([facility])   • 'light-for-dates' infant with signs of fetal malnutrition   • LAILA (acute kidney injury) ([address])   • Polypharmacy   • Cardiomyopathy ([address])   • Vitamin D insufficiency   • Stroke ([address])   • HTN (hypertension)   • HFrEF (heart failure with reduced ejection fraction) ([facility])   • Hypercalcemia   • Discoloration of skin of foot   • Abdominal pain   • Acute CVA (cerebrovascular accident) ([address])   • History of bacteremia   • Obstructive sleep apnea   • History of stroke   • Urinary tract infection without hematuria   • AMS (altered mental status)   • Gout of foot   • Dysphagia   • Wound of left ankle   • Hypokalemia   • Sinus tachycardia   • Hypomagnesemia   • Insomnia   • Nocturnal hypoxia   • Stroke-like symptoms   • Cerebral aneurysm, nonruptured   • Leg hematoma, left, initial encounter   • Night terrors   • Sundowning   • Cervical dystonia         Medications  Current Facility-Administered Medications   Medication Dose Route Frequency Provider Last Rate • acetaminophen  650 mg Oral Q6H [facility] Durga Valladares MD     • albuterol  2 puff Inhalation Q6H PRN Durga Valladares MD     • apixaban  5 mg Oral BID Druga Valladares MD     • buPROPion  100 mg Oral BID Durga Valladares MD     • cinacalcet  30 mg Oral Daily With Breakfast Durga Valladares MD     • Diclofenac Sodium  2 g Topical 4x Daily Durga Valladares MD     • docusate sodium  100 mg Oral BID Shorty Dobbins MD     • DULoxetine  60 mg Oral BID Durga Valladares MD     • gabapentin  300 mg Oral BID Shorty Dobbins MD     • hydroxychloroquine  200 mg Oral BID With Meals Durga Valladares MD     • lamoTRIgine  150 mg Oral HS Durga Valladares MD     • levofloxacin  250 mg Oral Q24H Ramonita Julian PA-C     • lidocaine  2 patch Topical HS Durga Valladares MD     • magnesium Oxide  400 mg Oral BID Durga Valladares MD     • melatonin  3 mg Oral HS Durga Valladares MD     • metoprolol succinate  25 mg Oral Daily LAURA Zavala     • morphine  7.5 mg Oral TID PRN Shorty Dobbins MD     • ondansetron  4 mg Oral Q6H PRN Durga Valladares MD     • pantoprazole  40 mg Oral Early Morning Durga Valladares MD     • polyethylene glycol  17 g Oral Daily PRN LAURA Rutledge     • polyethylene glycol  17 g Oral Once LAURA Bosch     • saliva substitute  5 spray Mouth/Throat 4x Daily LAURA Davis     • senna  1 tablet Oral BID Shorty Dobbins MD     • simethicone  80 mg Oral 4x Daily (with meals and at bedtime) LAURA Davis            ** Please Note: Fluency Direct voice to text software may have been used in the creation of this document.  **

## 2023-07-25 NOTE — PROGRESS NOTES
07/25/23 0930   Pain Assessment   Pain Assessment Tool 0-10   Pain Score 4   Pain Location/Orientation Orientation: Lower; Location: Back   Pain Onset/Description Onset: Ongoing   Patient's Stated Pain Goal No pain   Subjective   Subjective pt agreeable to participate in PT tx. pt requesting to trial recliner for in room, stating the [facility] is uncomfortable now with TLSO. Sit to Stand   Type of Assistance Needed Verbal cues; Incidental touching; Adaptive equipment   Comment RW   Sit to Stand CARE Score 4   Walk 10 Feet   Type of Assistance Needed Verbal cues; Incidental touching; Adaptive equipment   Comment RW   Walk 10 Feet CARE Score 4   Walk 50 Feet with Two Turns   Type of Assistance Needed Incidental touching;Verbal cues; Adaptive equipment   Comment RW   Walk 50 Feet with Two Turns CARE Score 4   Ambulation   Distance Walked (feet) 63 ft  (x 1, 50 ft x 1, 25 ft x 2)   Assist Device Roller Walker   Gait Pattern Inconsistant Amy; Improper weight shift; Forward Flexion   Limitations Noted In Balance; Endurance; Safety;Posture;Speed;Strength   Provided Assistance with: Balance;Weight Shift   Walk Assist Level Contact Guard   Findings PT pulled WC behind and cued pt to walk as far as she could before needing to sit. pt reports improved pain tolerance today allowing her to walk a little further. cues to maintain AYLA closer to RW   Does the patient walk? 2. Yes   Curb or Single Stair   Style negotiated Curb   Type of Assistance Needed Physical assistance;Verbal cues; Adaptive equipment   Physical Assistance Level 25% or less   Comment negotiated 6" curb step x 2 trials with assist to fully lift RW onto curb. cues for ensuring she is close enough before attempting to step up/down   1 Step (Curb) CARE Score 3   Toilet Transfer   Type of Assistance Needed Verbal cues; Incidental touching; Adaptive equipment   Comment RW/ grab bar onto standard toilet   Toilet Transfer CARE Score 4   Therapeutic Interventions   Strengthening seated: LAQs with 1.5#wt, hip add ball squeezes, hip abd with yellow t band. standing: heel raises, marches. all 3 sets of 10 with rest breaks provided PRN   Balance standing with RW, pt performed foot taps onto 6 inch block 3 sets of 10each LE. standing with one UE support on RW, pt performed ball taps with other UE with CGA/Marika  for balance with cues to reduce retropulsion   Assessment   Treatment Assessment Pt participated in 90 minute PT tx session focusing on BLE strengthening exercises, balance activities, and functional mobility training. Pt with improved tolerance to activities today compared to yesterdays session, able to increase gait distances and complete BLE ther ex with minimal complaints. pt wanted to re-assess sitting in recliner chair in room instead of WC, spoke to primary PT [place] who is agreeable. placed chair alarm and pts WC cushion on recliner and assisted pt to sit in recliner at end of session. pt reports improved comfort and prefers this over WC. pt will continue to benefit from skilled PT interventions to address deficits, reduce fall risk, and maximize functional independence,   Problem List Decreased strength;Decreased range of motion;Decreased endurance; Impaired balance;Decreased mobility; Decreased cognition; Impaired judgement;Decreased safety awareness;Orthopedic restrictions;Pain   Barriers to Discharge Inaccessible home environment;Decreased caregiver support   Plan   Treatment/Interventions Functional transfer training;LE strengthening/ROM; Elevations; Therapeutic exercise; Endurance training;Patient/family training;Equipment eval/education; Bed mobility;Gait training   Progress Progressing toward goals   PT Therapy Minutes   PT Time In 0930   PT Time Out 1100   PT Total Time (minutes) 90   PT Mode of treatment - Individual (minutes) 90   PT Mode of treatment - Concurrent (minutes) 0   PT Mode of treatment - Group (minutes) 0   PT Mode of treatment - Co-treat (minutes) 0   PT Mode of Treatment - Total time(minutes) 90 minutes   PT Cumulative Minutes 1113   Therapy Time missed   Time missed?  No

## 2023-07-26 PROCEDURE — 99232 SBSQ HOSP IP/OBS MODERATE 35: CPT | Performed by: INTERNAL MEDICINE

## 2023-07-26 PROCEDURE — 97530 THERAPEUTIC ACTIVITIES: CPT

## 2023-07-26 PROCEDURE — 97116 GAIT TRAINING THERAPY: CPT

## 2023-07-26 PROCEDURE — 99233 SBSQ HOSP IP/OBS HIGH 50: CPT | Performed by: PHYSICAL MEDICINE & REHABILITATION

## 2023-07-26 PROCEDURE — 97110 THERAPEUTIC EXERCISES: CPT

## 2023-07-26 PROCEDURE — 97535 SELF CARE MNGMENT TRAINING: CPT

## 2023-07-26 RX ADMIN — ONDANSETRON 4 MG: 4 TABLET, ORALLY DISINTEGRATING ORAL at 12:07

## 2023-07-26 RX ADMIN — PANTOPRAZOLE SODIUM 40 MG: 40 TABLET, DELAYED RELEASE ORAL at 05:04

## 2023-07-26 RX ADMIN — LAMOTRIGINE 150 MG: 100 TABLET ORAL at 21:52

## 2023-07-26 RX ADMIN — SIMETHICONE 80 MG: 80 TABLET, CHEWABLE ORAL at 08:43

## 2023-07-26 RX ADMIN — DOCUSATE SODIUM 100 MG: 100 CAPSULE ORAL at 17:15

## 2023-07-26 RX ADMIN — SIMETHICONE 80 MG: 80 TABLET, CHEWABLE ORAL at 21:53

## 2023-07-26 RX ADMIN — DICLOFENAC SODIUM 2 G: 10 GEL TOPICAL at 22:12

## 2023-07-26 RX ADMIN — GABAPENTIN 300 MG: 300 CAPSULE ORAL at 17:14

## 2023-07-26 RX ADMIN — HYDROXYCHLOROQUINE SULFATE 200 MG: 200 TABLET ORAL at 08:47

## 2023-07-26 RX ADMIN — Medication 5 SPRAY: at 12:08

## 2023-07-26 RX ADMIN — APIXABAN 5 MG: 5 TABLET, FILM COATED ORAL at 17:14

## 2023-07-26 RX ADMIN — BUPROPION HYDROCHLORIDE 100 MG: 100 TABLET, FILM COATED ORAL at 17:15

## 2023-07-26 RX ADMIN — MAGNESIUM OXIDE TAB 400 MG (241.3 MG ELEMENTAL MG) 400 MG: 400 (241.3 MG) TAB at 17:14

## 2023-07-26 RX ADMIN — LIDOCAINE 5% 2 PATCH: 700 PATCH TOPICAL at 22:06

## 2023-07-26 RX ADMIN — APIXABAN 5 MG: 5 TABLET, FILM COATED ORAL at 08:43

## 2023-07-26 RX ADMIN — BUPROPION HYDROCHLORIDE 100 MG: 100 TABLET, FILM COATED ORAL at 08:47

## 2023-07-26 RX ADMIN — DULOXETINE HYDROCHLORIDE 60 MG: 60 CAPSULE, DELAYED RELEASE ORAL at 08:43

## 2023-07-26 RX ADMIN — LEVOFLOXACIN 250 MG: 250 TABLET, FILM COATED ORAL at 12:14

## 2023-07-26 RX ADMIN — ACETAMINOPHEN 650 MG: 325 TABLET, FILM COATED ORAL at 05:04

## 2023-07-26 RX ADMIN — Medication 5 SPRAY: at 17:15

## 2023-07-26 RX ADMIN — Medication 5 SPRAY: at 08:48

## 2023-07-26 RX ADMIN — MELATONIN TAB 3 MG 3 MG: 3 TAB at 21:53

## 2023-07-26 RX ADMIN — SENNOSIDES 8.6 MG: 8.6 TABLET, FILM COATED ORAL at 08:43

## 2023-07-26 RX ADMIN — DICLOFENAC SODIUM 2 G: 10 GEL TOPICAL at 17:17

## 2023-07-26 RX ADMIN — MORPHINE SULFATE 7.5 MG: 15 TABLET ORAL at 08:43

## 2023-07-26 RX ADMIN — METOPROLOL SUCCINATE 12.5 MG: 25 TABLET, FILM COATED, EXTENDED RELEASE ORAL at 21:53

## 2023-07-26 RX ADMIN — GABAPENTIN 300 MG: 300 CAPSULE ORAL at 08:43

## 2023-07-26 RX ADMIN — HYDROXYCHLOROQUINE SULFATE 200 MG: 200 TABLET ORAL at 17:15

## 2023-07-26 RX ADMIN — Medication 5 SPRAY: at 22:01

## 2023-07-26 RX ADMIN — SIMETHICONE 80 MG: 80 TABLET, CHEWABLE ORAL at 17:14

## 2023-07-26 RX ADMIN — ACETAMINOPHEN 650 MG: 325 TABLET, FILM COATED ORAL at 12:07

## 2023-07-26 RX ADMIN — DICLOFENAC SODIUM 2 G: 10 GEL TOPICAL at 08:47

## 2023-07-26 RX ADMIN — CINACALCET 30 MG: 30 TABLET, FILM COATED ORAL at 08:47

## 2023-07-26 RX ADMIN — MORPHINE SULFATE 7.5 MG: 15 TABLET ORAL at 18:29

## 2023-07-26 RX ADMIN — DULOXETINE HYDROCHLORIDE 60 MG: 60 CAPSULE, DELAYED RELEASE ORAL at 17:14

## 2023-07-26 RX ADMIN — DICLOFENAC SODIUM 2 G: 10 GEL TOPICAL at 12:08

## 2023-07-26 RX ADMIN — DOCUSATE SODIUM 100 MG: 100 CAPSULE ORAL at 08:42

## 2023-07-26 RX ADMIN — MAGNESIUM OXIDE TAB 400 MG (241.3 MG ELEMENTAL MG) 400 MG: 400 (241.3 MG) TAB at 08:43

## 2023-07-26 RX ADMIN — ACETAMINOPHEN 650 MG: 325 TABLET, FILM COATED ORAL at 17:14

## 2023-07-26 RX ADMIN — SIMETHICONE 80 MG: 80 TABLET, CHEWABLE ORAL at 12:08

## 2023-07-26 NOTE — PLAN OF CARE
Problem: PAIN - ADULT  Goal: Verbalizes/displays adequate comfort level or baseline comfort level  Description: Interventions:  - Encourage patient to monitor pain and request assistance  - Assess pain using appropriate pain scale  - Administer analgesics based on type and severity of pain and evaluate response  - Implement non-pharmacological measures as appropriate and evaluate response  - Consider cultural and social influences on pain and pain management  - Notify physician/advanced practitioner if interventions unsuccessful or patient reports new pain  Outcome: Progressing     Problem: INFECTION - ADULT  Goal: Absence or prevention of progression during hospitalization  Description: INTERVENTIONS:  - Assess and monitor for signs and symptoms of infection  - Monitor lab/diagnostic results  - Monitor all insertion sites, i.e. indwelling lines, tubes, and drains  - Monitor endotracheal if appropriate and nasal secretions for changes in amount and color  - [place] appropriate cooling/warming therapies per order  - Administer medications as ordered  - Instruct and encourage patient and family to use good hand hygiene technique  - Identify and instruct in appropriate isolation precautions for identified infection/condition  Outcome: Progressing  Goal: Absence of fever/infection during neutropenic period  Description: INTERVENTIONS:  - Monitor WBC    Outcome: Progressing     Problem: SAFETY ADULT  Goal: Patient will remain free of falls  Description: INTERVENTIONS:  - Educate patient/family on patient safety including physical limitations  - Instruct patient to call for assistance with activity   - Consult OT/PT to assist with strengthening/mobility   - Keep Call bell within reach  - Keep bed low and locked with side rails adjusted as appropriate  - Keep care items and personal belongings within reach  - Initiate and maintain comfort rounds  - Make Fall Risk Sign visible to staff  - Offer Toileting every 2 Hours, in advance of need  - Initiate/Maintain bed/chair alarm  - Obtain necessary fall risk management equipment: alarms  - Apply yellow socks and bracelet for high fall risk patients  - Consider moving patient to room near nurses station  Outcome: Progressing  Goal: Maintain or return to baseline ADL function  Description: INTERVENTIONS:  -  Assess patient's ability to carry out ADLs; assess patient's baseline for ADL function and identify physical deficits which impact ability to perform ADLs (bathing, care of mouth/teeth, toileting, grooming, dressing, etc.)  - Assess/evaluate cause of self-care deficits   - Assess range of motion  - Assess patient's mobility; develop plan if impaired  - Assess patient's need for assistive devices and provide as appropriate  - Encourage maximum independence but intervene and supervise when necessary  - Involve family in performance of ADLs  - Assess for home care needs following discharge   - Consider OT consult to assist with ADL evaluation and planning for discharge  - Provide patient education as appropriate  Outcome: Progressing  Goal: Maintains/Returns to pre admission functional level  Description: INTERVENTIONS:  - Perform BMAT or MOVE assessment daily.   - Set and communicate daily mobility goal to care team and patient/family/caregiver. - Collaborate with rehabilitation services on mobility goals if consulted  - Perform Range of Motion 3 times a day. - Reposition patient every 2 hours.   - Dangle patient 3 times a day  - Stand patient 3 times a day  - Ambulate patient 3 times a day  - Out of bed to chair 3 times a day   - Out of bed for meals 3 times a day  - Out of bed for toileting  - Record patient progress and toleration of activity level   Outcome: Progressing     Problem: DISCHARGE PLANNING  Goal: Discharge to home or other facility with appropriate resources  Description: INTERVENTIONS:  - Identify barriers to discharge w/patient and caregiver  - Arrange for needed discharge resources and transportation as appropriate  - Identify discharge learning needs (meds, wound care, etc.)  - Arrange for interpretive services to assist at discharge as needed  - Refer to Case Management Department for coordinating discharge planning if the patient needs post-hospital services based on physician/advanced practitioner order or complex needs related to functional status, cognitive ability, or social support system  Outcome: Progressing     Problem: Prexisting or High Potential for Compromised Skin Integrity  Goal: Skin integrity is maintained or improved  Description: INTERVENTIONS:  - Identify patients at risk for skin breakdown  - Assess and monitor skin integrity  - Assess and monitor nutrition and hydration status  - Monitor labs   - Assess for incontinence   - Turn and reposition patient  - Assist with mobility/ambulation  - Relieve pressure over bony prominences  - Avoid friction and shearing  - Provide appropriate hygiene as needed including keeping skin clean and dry  - Evaluate need for skin moisturizer/barrier cream  - Collaborate with interdisciplinary team   - Patient/family teaching  - Consider wound care consult   Outcome: Progressing     Problem: Nutrition/Hydration-ADULT  Goal: Nutrient/Hydration intake appropriate for improving, restoring or maintaining nutritional needs  Description: Monitor and assess patient's nutrition/hydration status for malnutrition. Collaborate with interdisciplinary team and initiate plan and interventions as ordered. Monitor patient's weight and dietary intake as ordered or per policy. Utilize nutrition screening tool and intervene as necessary. Determine patient's food preferences and provide high-protein, high-caloric foods as appropriate.      INTERVENTIONS:  - Monitor oral intake, urinary output, labs, and treatment plans  - Assess nutrition and hydration status and recommend course of action  - Evaluate amount of meals eaten  - Assist patient with eating if necessary   - Allow adequate time for meals  - Recommend/ encourage appropriate diets, oral nutritional supplements, and vitamin/mineral supplements  - Order, calculate, and assess calorie counts as needed  - Recommend, monitor, and adjust tube feedings and TPN/PPN based on assessed needs  - Assess need for intravenous fluids  - Provide specific nutrition/hydration education as appropriate  - Include patient/family/caregiver in decisions related to nutrition  Outcome: Progressing     Problem: MOBILITY - ADULT  Goal: Maintain or return to baseline ADL function  Description: INTERVENTIONS:  -  Assess patient's ability to carry out ADLs; assess patient's baseline for ADL function and identify physical deficits which impact ability to perform ADLs (bathing, care of mouth/teeth, toileting, grooming, dressing, etc.)  - Assess/evaluate cause of self-care deficits   - Assess range of motion  - Assess patient's mobility; develop plan if impaired  - Assess patient's need for assistive devices and provide as appropriate  - Encourage maximum independence but intervene and supervise when necessary  - Involve family in performance of ADLs  - Assess for home care needs following discharge   - Consider OT consult to assist with ADL evaluation and planning for discharge  - Provide patient education as appropriate  Outcome: Progressing     Problem: Nutrition/Hydration-ADULT  Goal: Nutrient/Hydration intake appropriate for improving, restoring or maintaining nutritional needs  Description: Monitor and assess patient's nutrition/hydration status for malnutrition. Collaborate with interdisciplinary team and initiate plan and interventions as ordered. Monitor patient's weight and dietary intake as ordered or per policy. Utilize nutrition screening tool and intervene as necessary. Determine patient's food preferences and provide high-protein, high-caloric foods as appropriate.      INTERVENTIONS:  - Monitor oral intake, urinary output, labs, and treatment plans  - Assess nutrition and hydration status and recommend course of action  - Evaluate amount of meals eaten  - Assist patient with eating if necessary   - Allow adequate time for meals  - Recommend/ encourage appropriate diets, oral nutritional supplements, and vitamin/mineral supplements  - Order, calculate, and assess calorie counts as needed  - Recommend, monitor, and adjust tube feedings and TPN/PPN based on assessed needs  - Assess need for intravenous fluids  - Provide specific nutrition/hydration education as appropriate  - Include patient/family/caregiver in decisions related to nutrition  Outcome: Progressing

## 2023-07-26 NOTE — PROGRESS NOTES
OT Daily Treatment Note       07/26/23 0700   Pain Assessment   Pain Assessment Tool 0-10   Pain Score 9   Pain Location/Orientation Location: Head  ("base of skull, ear to ear")   Hospital Pain Intervention(s) Other (Comment)  (RN notified)   Restrictions/Precautions   Precautions 1:1;Aspiration;Bed/chair alarms;Cognitive; Fall Risk;Impulsive;Pain;Supervision on toilet/commode;Visual deficit   ROM Restrictions Yes  (spinal precautions)   Braces or Orthoses TLSO   Lifestyle   Autonomy "I can do this myself"   Eating   Type of Assistance Needed Set-up / clean-up   Physical Assistance Level No physical assistance   Comment seated in recliner, full set up req 2* dec coordination in LUE   Eating CARE Score 5   Oral Hygiene   Reason if not Attempted Refused to perform  (requested to complete after breakfast with PCA)   Oral Hygiene CARE Score 7   Shower/Bathe Self   Type of Assistance Needed Physical assistance   Physical Assistance Level 51%-75%   Comment SB completed seated EOB.   pt able to bathe UB with supervision, santiago/rear in stance with CG/min A for balance and assist for feet 2* spinal precautions   Shower/Bathe Self CARE Score 2   Bathing   Assessed Bath Style Sponge Bath   Tub/Shower Transfer   Reason Not Assessed Sponge Bath   Upper Body Dressing   Type of Assistance Needed Physical assistance   Physical Assistance Level 51%-75%   Comment Assit to thread RUE and pull shirt down back, TA for TLSO brace   Upper Body Dressing CARE Score 2   Lower Body Dressing   Type of Assistance Needed Physical assistance   Physical Assistance Level 51%-75%   Comment seated EOB, assisting with thrading BLE, able to don over hips in stance with CG   Lower Body Dressing CARE Score 2   Putting On/Taking Off Footwear   Type of Assistance Needed Physical assistance   Physical Assistance Level 76% or more   Comment pt able to use reacher to doff socks; TA required to don   Putting On/Taking Off Footwear CARE Score 2   Lying to Sitting on Side of Bed   Type of Assistance Needed Supervision   Physical Assistance Level No physical assistance   Comment HOB elevated to 45 degrees   Lying to Sitting on Side of Bed CARE Score 4   Sit to Stand   Type of Assistance Needed Incidental touching   Physical Assistance Level No physical assistance   Comment RW   Sit to Stand CARE Score 4   Bed-Chair Transfer   Type of Assistance Needed Incidental touching   Physical Assistance Level No physical assistance   Comment CG SPT with RW   Chair/Bed-to-Chair Transfer CARE Score 4   Cognition   Overall Cognitive Status Impaired   Arousal/Participation Alert; Cooperative   Attention Attends with cues to redirect   Orientation Level Oriented X4   Memory Decreased short term memory   Following Commands Follows one step commands with increased time or repetition   Activity Tolerance   Activity Tolerance Patient tolerated treatment well   Assessment   Treatment Assessment Pt participated in skilled OT session with focus on ADL retraining, functional transfer training, compensatory technique education and continued education. See flowsheet for details of session and current functional status. Pt is limited by weakness, decreased ROM, impaired balance, decreased endurance, increased fall risk, decreased ADLS, pain, decreased activity tolerance and decreased strength and requires skilled OT services to increase independence and safety with ADL completion in prep for DC home. Plan to continue ADL retraining, functional transfer training, UE strengthening/ROM, endurance training, cognitive reorientation, Pt/caregiver education, continued education, energy conservation and activity engagement  to address barriers mentioned above. Prognosis Fair   Problem List Decreased strength;Decreased range of motion;Decreased endurance; Impaired balance;Decreased mobility; Decreased cognition; Impaired judgement;Decreased safety awareness;Orthopedic restrictions;Pain   Barriers to Discharge Inaccessible home environment;Decreased caregiver support   Plan   Treatment/Interventions ADL retraining;Functional transfer training; Therapeutic exercise;Cognitive reorientation; Compensatory technique education   Progress Progressing toward goals   OT Therapy Minutes   OT Time In 0700   OT Time Out 0830   OT Total Time (minutes) 90   OT Mode of treatment - Individual (minutes) 90   OT Mode of treatment - Concurrent (minutes) 0   OT Mode of treatment - Group (minutes) 0   OT Mode of treatment - Co-treat (minutes) 0   OT Mode of Treatment - Total time(minutes) 90 minutes   OT Cumulative Minutes 1508   Therapy Time missed   Time missed?  No

## 2023-07-26 NOTE — CASE MANAGEMENT
Met w/pt and reviewed team update with dc scheduled for Saturday 7/29. Pt is "ecstatic" and states that is a good day as her son will also be around to assist. Cm confirmed with pt [facility] services are in place through [facility] and they are waiting for the dc date. Cm sent mssg via [facility] of new dc date of 7/29.

## 2023-07-26 NOTE — TEAM CONFERENCE
Acute RehabilitationTeam Conference Note  Date: 7/26/2023   Time: 11:39 AM       Patient Name:  Guy Zuñiga       Medical Record Number: [id number]   YOB: 1955  Sex: Female          Room/Bed:  [facility]0/[place] [address]-01  Payor Info:  Payor: MEDICARE / Plan: MEDICARE A AND B / Product Type: Medicare A & B Fee for Service /      Admitting Diagnosis: Hematoma of leg, left, initial encounter [S80.12XA]  Hematoma of left hip [S70.02XA]   Admit Date/Time:  7/7/2023 11:57 AM  Admission Comments: No comment available     Primary Diagnosis:  Leg hematoma, left, initial encounter  Principal Problem: Leg hematoma, left, initial encounter    Patient Active Problem List    Diagnosis Date Noted   • Cervical dystonia 07/11/2023   • Sundowning 07/09/2023   • Night terrors 07/07/2023   • Leg hematoma, left, initial encounter 07/01/2023   • Cerebral aneurysm, nonruptured 05/15/2023   • Nocturnal hypoxia 05/01/2023   • Stroke-like symptoms 05/01/2023   • Insomnia 04/03/2023   • Hypomagnesemia 03/30/2023   • Sinus tachycardia 03/29/2023   • Hypokalemia 03/10/2023   • AMS (altered mental status) 03/09/2023   • Gout of foot 03/09/2023   • Dysphagia 03/09/2023   • Wound of left ankle 03/09/2023   • Urinary tract infection without hematuria 03/06/2023   • Obstructive sleep apnea 02/28/2023   • History of stroke 02/28/2023   • History of bacteremia 02/24/2023   • Acute CVA (cerebrovascular accident) ([address]) 02/23/2023   • Hypercalcemia 02/22/2023   • Discoloration of skin of foot 02/22/2023   • Abdominal pain 02/22/2023   • HFrEF (heart failure with reduced ejection fraction) ([address]) 02/13/2023   • Stroke ([address]) 02/12/2023   • HTN (hypertension) 02/12/2023   • Cardiomyopathy ([address]) 02/07/2023   • Vitamin D insufficiency 02/07/2023   • LAILA (acute kidney injury) ([address]) 04/23/2022   • Polypharmacy 04/23/2022   • 'light-for-dates' infant with signs of fetal malnutrition 04/22/2022   • Bipolar depression ([address]) 03/04/2022   • Rheumatoid arthritis of multiple sites with negative rheumatoid factor ([address]) 03/04/2022   • Closed fracture of multiple ribs of right side 03/02/2022   • Traumatic pneumothorax 03/02/2022   • Closed fracture of transverse process of lumbar vertebra ([address]) 03/02/2022   • Liver contusion 03/02/2022   • Fall 03/02/2022   • Acute pain due to trauma 03/02/2022   • Right shoulder pain 03/02/2022   • Severe protein-calorie malnutrition ([address]) 03/02/2022   • Undifferentiated connective tissue disease ([address]) 12/29/2021   • Primary generalized (osteo)arthritis 12/29/2021   • Peripheral neuropathy 12/29/2021   • Osteoporosis 12/29/2021   • Hx of compression fracture of spine 12/29/2021   • Lumbar spondylosis 12/29/2021   • Cervical spondylosis 12/29/2021   • Anemia 06/26/2021   • Chronic pain syndrome    • Mild intermittent asthma without complication    • Ambulatory dysfunction    • Seronegative arthropathy of multiple sites ([address])    • Gastroesophageal reflux disease without esophagitis    • Mixed stress and urge urinary incontinence    • CHF (congestive heart failure) ([address])        Physical Therapy:    Weight Bearing Status: Full Weight Bearing  Transfers: Incidental Touching  Bed Mobility: Incidental Touching  Amulation Distance (ft): 65 feet  Ambulation: Incidental Touching  Assistive Device for Ambulation: Roller Walker  Wheelchair Mobility Distance:  (n/a)  Wheelchair Mobility:  (n/a)  Number of Stairs: 1 (6" curb step)  Assistive Device for Stairs: [employer] Assistance: Minimal Assistance  Ramp: Incidental Touching  Assistive Device for Ramp: Roller Walker  Discharge Recommendations: Home with:  [address] [place] [address] with[de-identified] Family Support, First Floor Setup, 24 Hour Supervision, Home Physical Therapy    Date: 7/12/2023  Date of Evaluation: 7/10/2023  Estimated Length of Stay: 10-14 days     Barriers to Discharge Home: decreased caregiver support, cognition, high fall risk, decreased righting reactions, inaccessible home environment (1 TAB)  PT Interventions to address Barriers: bed mobility, transfer training, gait training, higher level dynamic balance, single step/curb navigation. Equipments Needs: pt reports having RW at home. PT Plan of Care for the Week: gait training with RW, transfer training, LE strengthening, higher level dynamic balance   Family Training Needed: TBD pending patient progress   Discharge Planning: home health PT vs subacute rehab    Date: 7/18/23  Date of Evaluation:7/10/2023  Estimated Length of Stay: D/C Friday 7/21/23    Barriers to Discharge Home: cognition, high fall risk, decreased righting reactions  PT Interventions to address Barriers: higher level dynamic balance, single step/curb navigation, LE strengthening, repetitive gait  Equipments Needs: has RW  PT Plan of Care for the Week: gait training with RW, transfer training, LE strengthening, higher level dynamic balance   Family Training Needed: Family declined stating she feels she is same as PLOF  Discharge Planning: home health PT    Date: 7/2523  Date of Evaluation:7/10/2023  Estimated Length of Stay: extended due to acute T12 fx resulting from fall    Barriers to Discharge Home: cognition, high fall risk, decreased righting reactions  PT Interventions to address Barriers: higher level dynamic balance, single step/curb navigation, LE strengthening, repetitive gait, pain management  Equipments Needs: has RW  PT Plan of Care for the Week: gait training with RW, transfer training, LE strengthening, higher level dynamic balance   Family Training Needed: Daughter came for PT session 7/24/23  Discharge Planning: home health PT        Occupational Therapy:  Eating: Supervision  Grooming: Supervision  Bathing: Moderate Assistance  Bathing:  Moderate Assistance  Upper Body Dressing: Maximum Assistance (TLSO management)  Lower Body Dressing: Maximum Assistance (with use of LHAE)  Toileting: Minimal Assistance  Toilet Transfer: Minimal Assistance  Cognition: Exceptions to WNL  Cognition: Decreased Memory, Decreased Executive Functions, Decreased Attention, Decreased Safety, Impulsive  Orientation: Person  Discharge Recommendations: Home with:  [address] with[de-identified] 24 Hour Supervision, 24 Hour Assistance, First Floor Setup, Home Occupational Therapy       Pt reports PLOF as needing some assistance for bathing/dressing with dtr coming 3x per week to help her shower, having someone with her during grooming tasks, staying on first floor of home and walking with RW though pt does have WC at home for distances. Pt reports having the following DME available at home: WC, RW, grab bars, handheld shower head, and shower chair. Pt reports 3-4 falls at home. Upon evaluation, pt current level of fxn: modA for UB/LB dressing with inc time, Marika with mod VC with RW for functional transfers, Marika for toileting, and Marika for grooming due to the following deficits impacting occupational performance: deconditioning, impaired stand tolerance, impaired standing balance, pain mgmt, dec UE/LE strength, impaired cognition/decreased safety, and Hx of multiple falls and pt self report of fear of falling. Based on OT evaluation, pt demonstrates good rehab potential and would benefit from skilled OT services to reach pt goals of supervision w/ ELOS 2 weeks to return to least restrictive environment pending pt progress with family support and emphasis on caregiver education/resources to decrease pt risk of falls and rehospitalization. Goals for this week: continue to focus on ADL management, func transfers with RW and attention to L body/visual field. D/C date: TBD.     7/18/23 update:  Pt demo's progress since previous assessment with self care tasks and func transfers with RW progressing to overall min A for LE self care (mod A UB), Supervision with RW for func transfers.  Pt remains limited due to the following deficits impacting occupational performance: deconditioning, impaired stand tolerance, impaired standing balance, pain mgmt, dec UE/LE strength, impaired cognition/decreased safety, and Hx of multiple falls and pt self report of fear of falling. Pt demonstrates good rehab potential and would benefit from skilled OT services to reach pt goals of supervision w/ ELOS 2 weeks to return to least restrictive environment pending pt progress with family support and emphasis on caregiver education/resources to decrease pt risk of falls and rehospitalization. Goals for this week: continue to focus on ADL management, func transfers with RW and attention to L body/visual field. D/C date: 7/21/23 home with supportive family and home OT services    7/25/23 update:  Pt noted with slight decline in functional status since prior assessment as pt noted with fall last week and now presents with back pain, TLSO and spinal precautions in addition to current precautions. Pt's current functional status is Max A for UB/LB dressing with use of LHAE, mod A for bathing and Min A for functional transfers with use of RW. Pt remains limited due to the following deficits impacting occupational performance: deconditioning, impaired stand tolerance, impaired standing balance, pain mgmt, dec UE/LE strength, impaired cognition/decreased safety, back pain and Hx of multiple falls and pt self report of fear of falling. Goals for this week: continue to focus on ADL management with LHAE and to maintain spinal precautions w/o VCs, func transfers with RW and attention to L body/visual field.       Speech Therapy:  Mode of Communication: Verbal  Cognition: Exceptions to WNL  Cognition: Decreased Memory, Decreased Executive Functions, Decreased Attention, Decreased Comprehension, Decreased Safety  Orientation: Person, Place, Time, Situation  Swallowing: Exceptions to WNL  Swallowing: Oral Dysphagia, Pharyngeal Dysphagia, Esophageal Dysphagia, Aspiration Risk, Reflux Precautions  Diet Recommendations: Level 1/Puree, [place] Thick  Discharge Recommendations: Home with:  DC Home with[de-identified] 24 Hour Supervision, 24 Hour Assisteance, Family Support, Home Speech Therapy  Pt completed beside swallow assessment on initial eval. Pt known to this unit from previous admission. Pt with baseline dysphagia- diet at home puree and NTL. Currently, pt presenting with  moderate oral and pharyngeal dysphagia. Symptoms or concerns included trace anterior loss of thins, slow manipulation of puree, prolonged mastication of level 2 trials, reduced bolus formation, suspected reduced bolus control, slow and at times labored transfers, suspected pharyngeal swallow delay, suspected reduced hyolaryngeal elevation, secondary swallows, audible swallows and presence of frequent throat clearing and coughing across meal. Recommendations are for cont'd puree and NTL at this time- frequent oral care given mild thrush which is improving. Plan to cont to assess at bedside and possibly complete repeat VFSS as last one (to this department knowledge) was completed on [facility] in March 2023. In regards to cognition, pt with baseline mild cognitive deficits with sundowning behaviors. Pt will cont to benefit from skilled SLP services targeting swallow function for increased independence and decreased burden of care at this time. Update for week of 7/18/2023: Pt conts to be followed for skilled SLP services targeting swallow. Pt currently functioning at mild to moderate oral and pharyngeal dysphagia. Symptoms or concerns included slow manipulation of puree, reduced bolus formation, suspected reduced bolus control, slow and at times labored transfers, suspected pharyngeal swallow delay, suspected reduced hyolaryngeal elevation, secondary swallows, audible swallows as well as GI s/s present. Discussed held with pt in regards to GI hx- pt recommended follow up with her outpt specialist through [facility], pt does not want to pursue work up during this hospital stay with [facility].  Education started in regards to swallow function and diet recommendations. Folder started with educational handouts, diet education, where to purchase different thickeners as well as recipes for high protein puree options. Plan to cont to trial some softer solid options to allow some options of level 2 items while at home under home SLP supervision/recommendations. Plan is for discharge Friday with home therapy services- pt will benefit from cont'd dysphagia tx to maximize swallow function and tolerate LRD with decreased s/s aspiration at this time. Update week of 7/25/2023: Pt continues to be followed for skilled SLP services focusing on dysphagia therapy. Pt is presenting with mild-moderate oral and moderate pharyngeal dysphagia characterized by decreased bolus propulsion, decreased mastication, decreased bolus formation, delayed oral intiation and suspected decreased control of NTL suspected pharyngeal swallow delay, suspected decreased hyolaryngeal elevation upon palpation, suspected pharyngeal residue, multiple swallows, effortful swallow, audible swallows and weaker throat clearing. Pt remains on a puree diet with NTL, as well as recommendations for full supervision for meals in order to ensure carryover of safe swallow strategies. Limited trials of level 2 items have been completed, however, pt demonstrates notable difficulty with these trials at this time. Family training has been completed with both of pt's children regarding current diet recommendations, positioning and safe swallow strategies. Pt is currently recommended for further skilled SLP services targeting dysphagia therapy in order to maximize oral and pharyngeal swallow skills, while safely supporting PO intake, as well as to improve independent carryover of safe swallow strategies.        Nursing Notes:  Appetite: Fair  Diet Type: Dysphagia I, Nectar thick liquids                                                                     Pain Location/Orientation: Location: Head ("base of skull, ear to ear")  Pain Score: 5                       Hospital Pain Intervention(s): Other (Comment) (RN notified)          70 y.o. female with medical history of HTN, CVA in 2/2023 on eliquis (chronic dysphagia, L sided weakness, cognitive impairment), hypercalcemia,  CHF, GERD, SHRA, gout, bipolar disorder, RA on plaquenil, chronic opioid dependence on morphine for chronic back/joint pain, who presented to the [address] [place] [address] [id number] on 6/30 after a fall, unable to get up and disoriented. She thinks she tripped and fell down her porch steps, landing on he L thigh. Found to have a L lateral thigh hematoma with evidence of extravasation on imaging. Binder placed over hips, and eliquis was held but not reversed given her recent stroke. Speech consulted and noted oropharyngeal swallow likely at baseline and recommended Level 1/NTL. Recommended for VBS. Course c/b ABLA on 7/2 Hgb dropped to 6.8 and she was given 1 unit pRBC. Geriatrics was consulted, noted at baseline she is oriented but forgetful, with worsening issues since her stroke. Mini-Cog was 9/15 in 2021. They recommended checking B12. They noted she has not been on lorazepam since January, and also recommended weaning down off gabapentin if possible. On 7/4 patient had an unwitnessed fall while trying to reach for her bag on the window sill. No head strike, but landed on her L hip. GCS 15, Neuro exam was baseline. CTA ordered to look for active bleeding, which showed decreased size of hematoma with no active extravasation. Hgb did drop after her transfusion, but seems to have stablized today at 8.5. That being said, her HR was elevated today, and it appears a troponin has been ordered by the trauma team as well as a CXR. CXR showed hazy RLL opacity, likely 2/2 atelectasis, and trops trended down. Metoprolol was increased with improvement. She was admitted to the [facility] on 7/7.     This week we will continue to monitor labs and vital signs. WE will educate pt/family about repositioning to prevent skin breakdown. We will assist w repositioning and perform routine skin checks. We will monitor for adequate pain control. We will encourage independence with ADLs. We will increase safety awareness and keep pt free from falls. We will monitor I/O to maintain adequate fluid balance. 07/19/23 - Pt continues to require alarms and fall mats for safety. Pt is impulsive at night and forgets to use call bell. We will continues to provide frequent rounding and education to the patient for safety and risk of falls. Pt requires vital sign monitoring and blood work for infection prevention and cardiac history. Pt's pain has been controlled on current regime with exception of some breakthrough pain at night. Pt requires assistance with ambulating to and from the bathroom and positioning in bed due to her hyperextended neck. Pt has hx of SHAR but does not use a CPAP so 2L of O2 is used at night for comfort. Pt's diet is Dysphagia 2 with  nectar thick liquids and full supervision during meals. Pt is currently NPO for a swallow study scheduled for tomorrow. Pt takes pills whole in pudding. Both legs are wrapped in ACE wraps during the day and pt requires assistance from staff to do this. We will continue to monitor the patient for safety awareness and keep the pt free from falls. 7/26/23- pt continues to be 1:1 continual observation for impulsivity, cog deficits and safety. Pt reports adequate pain mgmt with current regimen. She continues to require monitoring VS, Skin checks and labs. She requires assistance with T&R, maintaining TLSO brace when OOB and >45 degrees. Pt requires supervision and setup w/meals and  maintain adequate po intake. She requires assistance with administering meds in pudding She is voiding continently w/o difficulty. Her LBM was 7/24 w/o difficulty. Safety and fall precautions are maintained and reinforced.       Case Management:     Discharge Planning  Living Arrangements: Other (Comment) (Lives w/ son)  Support Systems: Self, Son, Daughter  Assistance Needed: unknown  Type of Current Residence: Other (Comment) (two story home)  Current Home Care Services: No  Pt was making good gains and suffered a fall. Pt is now in a tlso and continues with therapy to improve overall functional ability. Pts daughter present and aware of pts current status. Expect dc home later this week or early next with contd [facility] services through [facility]. Is the patient actively participating in therapies? yes  List any modifications to the treatment plan:     Barriers Interventions   Pain, uti Medications, repositioning   tlso spinal precautions lhae training, family education, therapy exercises                 Is the patient making expected progress toward goals? yes  List any update or changes to goals:     Medical Goals: Patient will be medically stable for discharge to [facility] upon completion of rehab program and Patient will be able to manage medical conditions and comorbid conditions with medications and follow up upon completion of rehab program    Weekly Team Goals:   Rehab Team Goals  ADL Team Goal: Patient will require supervision with ADLs with least restrictive device upon completion of rehab program  Transfer Team Goal: Patient will require supervision with transfers with least restrictive device upon completion of rehab program  Locomotion Team Goal: Patient will require supervision with locomotion with least restrictive device upon completion of rehab program  Cognitive Team Goal: Patient will return to premorbid level of cognitive activity upon completion of rehab program    Discussion: pt presents with the above barriers and pain is biggest barrier effecting mobility. Pt is now with tlso brace and is requiring training with use of lhae. Pt has poor po intake which is baseline.  Diet is puree and nectar thick which is also baseline. Pt is contact guard for transfer and mobility, min a on stairs. Overall adls are mod to max a due to increased right shoulder pain and tlso. Pt will be continuing with [facility] services for rn pt ot and speech     Anticipated Discharge Date:  7/29/23  [facility] Team Members Present: The following team members are supervising care for this patient and were present during this Weekly Team Conference.     Physician: Dr. Laly Castillo MD  : MING Khan  Registered Nurse: Tatyana Pichardo RN  Physical Therapist: Amelia Tong DPT  Occupational Therapist: Mya Cotter MS, OTR/L  Speech Therapist: Mary Salisbury, [facility], [address] [facility]

## 2023-07-26 NOTE — PROGRESS NOTES
07/26/23 1000   Pain Assessment   Pain Score 5  (mostly tramsitions from sit to stand)   Pain Location/Orientation Location: Back;Orientation: Mid;Orientation: Bilateral   Hospital Pain Intervention(s) Repositioned; Rest   Restrictions/Precautions   Precautions 1:1;Bed/chair alarms; Fall Risk;Supervision on toilet/commode;Pain;Spinal precautions;Aspiration  (Puree, nectar)   Braces or Orthoses TLSO  (OOB)   Cognition   Arousal/Participation Alert; Cooperative   Attention Attends with cues to redirect   Memory Decreased short term memory;Decreased recall of precautions   Following Commands Follows one step commands with increased time or repetition   Subjective   Subjective Pt. has no new complaints. Still has pain but had pain meds around 20 mins ago as per 1:1. Pt. wanted to go to bathroom at start of session   Sit to Stand   Type of Assistance Needed Verbal cues; Adaptive equipment; Incidental touching   Comment verbal cues for hand placement with RW   Sit to Stand CARE Score 4   Bed-Chair Transfer   Type of Assistance Needed Incidental touching; Adaptive equipment   Comment with RW   Chair/Bed-to-Chair Transfer CARE Score 4   Transfer Bed/Chair/Wheelchair   Adaptive Equipment Roller Walker   Car Transfer   Type of Assistance Needed Physical assistance   Physical Assistance Level 26%-50%   Comment min A to lower to seat to protect back   Car Transfer CARE Score 3   Walk 10 Feet   Type of Assistance Needed Incidental touching; Adaptive equipment   Comment with RW   Walk 10 Feet CARE Score 4   Walk 50 Feet with Two Turns   Type of Assistance Needed Incidental touching; Adaptive equipment   Comment with RW   Walk 50 Feet with Two Turns CARE Score 4   Walk 150 Feet   Comment limited by fatigue   Reason if not Attempted Safety concerns   Walk 150 Feet CARE Score 88   Walking 10 Feet on Uneven Surfaces   Comment trial tomorrow on foamt mat   Ambulation   Primary Mode of Locomotion Prior to Admission Walk   [facility] (feet) 100 ft  (X 2, 70 X 1, 30 X 1)   Assist Device Roller Walker   Gait Pattern Forward Flexion; Inconsistant Amy   Limitations Noted In Endurance   Provided Assistance with: Balance   Walk Assist Level Contact Guard   Does the patient walk? 2. Yes   Wheelchair mobility   Does the patient use a wheelchair? 0. No   Findings but pt. requested on working on moving w/c for exercises using mostly R UE and then B LE with S X 50 feet. STated that she will not be able to use w/c inside the house , mostly uses it in the community   Curb or Single Stair   Style negotiated Single stair   Type of Assistance Needed Physical assistance   Physical Assistance Level 26%-50%   Comment using RW on 6" curb. Assist to place RW on curb   1 Step (Curb) CARE Score 3   4 Steps   Reason if not Attempted Activity not applicable   4 Steps CARE Score 9   12 Steps   Reason if not Attempted Activity not applicable   12 Steps CARE Score 9   Stairs   Type Curb   # of Steps 1   Weight Bearing Precautions Fall Risk;Back   Assist Devices Roller Walker   Findings pt. on first floor set up   Toilet Transfer   Type of Assistance Needed Incidental touching; Adaptive equipment   Comment using RW and grab bar   Toilet Transfer CARE Score 4   Toilet Transfer   Surface Assessed Standard Toilet   Adaptive Equipment Grab Bar  (RW)   Findings able to manage pants down but needs min A for pants up and for rear hygiene   Therapeutic Interventions   Strengthening Seated LAQ and hip flex with 1.5# wts, add squeeze with ball, gluteal squeeze, hip abd and hamstring curl with yellow TB   Flexibility B hamstring and gastroc stretching   Assessment   Treatment Assessment Pt. engaged in 90 mins session and tolerated session well with rest breaks as needed. Pt. cont to have pain but mostly transitions from sit to stand. Pt. demonstrates inc activity tolerance in ambulation to 100 feet and CGA with transfers with verbal cues for hand placement.  No major LOB noted during session. WIll benefit from continued PT to max functional independence prior to d/c. FT is recommended if d/c date had been set in today's team   Problem List Decreased strength;Decreased endurance; Impaired balance;Decreased mobility; Decreased safety awareness;Pain;Orthopedic restrictions   Barriers to Discharge Inaccessible home environment;Decreased caregiver support   PT Barriers   Functional Limitation Car transfers;Stair negotiation;Standing;Transfers; Walking   Plan   Treatment/Interventions Functional transfer training;LE strengthening/ROM; Elevations; Therapeutic exercise; Endurance training;Patient/family training;Equipment eval/education; Bed mobility;Gait training   Recommendation   PT Discharge Recommendation Home with home health rehabilitation   Equipment Recommended Walker   PT Therapy Minutes   PT Time In 1000   PT Time Out 1130   PT Total Time (minutes) 90   PT Mode of treatment - Individual (minutes) 90   PT Mode of treatment - Concurrent (minutes) 0   PT Mode of treatment - Group (minutes) 0   PT Mode of treatment - Co-treat (minutes) 0   PT Mode of Treatment - Total time(minutes) 90 minutes   PT Cumulative Minutes 1203   Therapy Time missed   Time missed?  No

## 2023-07-26 NOTE — PROGRESS NOTES
PM&R PROGRESS NOTE:  Sylvia Eduardo 70 y.o. female MRN: [id number]  Unit/Bed#: -01 Encounter: [id number]    Rehabilitation Diagnosis: Impairment of mobility, safety, Activities of Daily Living (ADLs), and cognitive/communication skills due to Other Disabling Impairments:  13  Other Disabling Impairments    HPI: Sylvia Eduardo is a 70 y.o. female with medical history of HTN, CVA in 2/2023 on eliquis (chronic dysphagia, L sided weakness, cognitive impairment), hypercalcemia,  CHF, GERD, SHAR, gout, bipolar disorder, RA on plaquenil, chronic opioid dependence on morphine for chronic back/joint pain, who presented to the [address] [place] [address] [address] on 6/30 after a fall, unable to get up and disoriented. She thinks she tripped and fell down her porch steps, landing on he L thigh. Found to have a L lateral thigh hematoma with evidence of extravasation on imaging. Binder placed over hips, and eliquis was held but not reversed given her recent stroke. Speech consulted and noted oropharyngeal swallow likely at baseline and recommended Level 1/NTL. Recommended for VBS. Course c/b ABLA on 7/2 Hgb dropped to 6.8 and she was given 1 unit pRBC. Geriatrics was consulted, noted at baseline she is oriented but forgetful, with worsening issues since her stroke. Mini-Cog was 9/15 in 2021. They recommended checking B12. They noted she has not been on lorazepam since January, and also recommended weaning down off gabapentin if possible. On 7/4 patient had an unwitnessed fall while trying to reach for her bag on the window sill. No head strike, but landed on her L hip. GCS 15, Neuro exam was baseline. CTA ordered to look for active bleeding, which showed decreased size of hematoma with no active extravasation. Hgb did drop after her transfusion, but seems to have stablized today at 8.5.  That being said, her HR was elevated today, and it appears a troponin has been ordered by the trauma team as well as a CXR. CXR showed hazy RLL opacity, likely 2/2 atelectasis, and trops trended down. Metoprolol was increased with improvement. She was admitted to the [facility] on 7/7. SUBJECTIVE: Patient seen face to face. No acute issues overnight. Slept well, reports mid-low back pain, worse with movement from seated to standing and when initially seated. Pain does not radiate. Currently 6/10. Pain is relieved in side lying position. Appetite poor (at baseline), patient is making effort to eat all of dietary supplements. Voiding, LBM 7/26. Denies chest pain, shortness of breath, fever, chills, N/V, abdominal pain. ASSESSMENT: Stable, progressing    PLAN:  - pain management: encouraged to return to bed in the afternoon for pain relief, continue MSIR 7.5 mg TID prn  - T12 wedge fracture: thoracic spinal precautions, TLSO when OOB and >45 degrees. - UTI: continue Levaquin for complicated UTI, urology recommends follow-up outpatient with Urology for suppressive antibiotics  - right shoulder pain- improving, continue Voltaren gel     Rehabilitation  Team conference: The team discussed patient's functional status, goals, and barriers. • Functional deficits:  cognitive impairment, self-care, impaired mobility  • Continue current rehabilitation plan of care to maximize function.    • Functional update:   Physical Therapy Occupational Therapy Speech Therapy   Weight Bearing Status: Full Weight Bearing  Transfers: Incidental Touching  Bed Mobility: Incidental Touching  Amulation Distance (ft): 65 feet  Ambulation: Incidental Touching  Assistive Device for Ambulation: Roller Walker  Wheelchair Mobility Distance:  (n/a)  Wheelchair Mobility:  (n/a)  Number of Stairs: 1 (6" curb step)  Assistive Device for Stairs: [employer] Assistance: Minimal Assistance  Ramp: Incidental Touching  Assistive Device for Ramp: Roller Walker  Discharge Recommendations: Home with:  [facility] with[de-identified] Family Support, First Floor Setup, 24 Hour Supervision, Home Physical Therapy   Eating: Supervision  Grooming: Supervision  Bathing: Moderate Assistance  Bathing: Moderate Assistance  Upper Body Dressing: Maximum Assistance (TLSO management)  Lower Body Dressing: Maximum Assistance (with use of LHAE)  Toileting: Minimal Assistance  Toilet Transfer: Minimal Assistance  Cognition: Exceptions to WNL  Cognition: Decreased Memory, Decreased Executive Functions, Decreased Attention, Decreased Safety, Impulsive  Orientation: Person   Mode of Communication: Verbal  Cognition: Exceptions to WNL  Cognition: Decreased Memory, Decreased Executive Functions, Decreased Attention, Decreased Comprehension, Decreased Safety  Orientation: Person, Place, Time, Situation  Swallowing: Exceptions to WNL  Swallowing: Oral Dysphagia, Pharyngeal Dysphagia, Esophageal Dysphagia, Aspiration Risk, Reflux Precautions  Diet Recommendations: Level 1/[place], [place] Thick  Discharge Recommendations: Home with:  [facility] with[de-identified] 24 Hour Supervision, 24 Hour Assisteance, Family Support, Home Speech Therapy     • Estimated Discharge: anticipated 7/29 with home PT/OT    Pain  • Tylenol 975 mg every 8 hours  • Lidocaine patch  • Gabapentin 400 mg BID  • Diclofenac Sodium topical gel QID prn  • Morphine Sulfate 7.5 mg TID prn    DVT prophylaxis  • Eliquis    Bladder plan  • Continent    Bowel plan  • Continent  • Miralax daily prn      * Leg hematoma, left, initial encounter  Assessment & Plan  2/2 fall    6/30 CT CAP:  Large hematoma in the lateral left thigh measuring up to 9.6 cm with focus of active contrast extravasation. No acute intrathoracic or intra-abdominal injury. 7/4 CTA Pelvis:  1. Decreasing size of left lateral thigh hematoma without evidence for active extravasation. 2.  No CT findings in the visualized pelvis and thighs to explain falling hematocrit. 3.  Pancolitis with increasing inflammatory free fluid in the pelvis.  No free air, pneumatosis, or encapsulated collections demonstrated. Non-operatively managed. Did require transfusion x1  Pain management as below  Monitor Hgb. PT/OT 3-5 hours/day, 5-7 days/week  Outpatient f/u with General Surgery      History of stroke  Assessment & Plan  MRI brain 2/22 - 1.  New small acute /more recent interval lacunar infarction in the right cerebral peduncle (series 4, image 14). 2.  Extensive multifocal large, previously present right middle cerebral artery infarction which is evolving with superimposed areas of evolving hemorrhage likely related to hemorrhagic transformation of subacute right MCA infarction.  No significant   mass effect. 3.  Mild, chronic microangiopathy. S/P thrombectomy 2/12 by Dr Gallo Scarce   Secondary stroke prophylaxis at this time with Eliquis 5 mg twice daily   PT, OT, SLP. Review modifiable risk factors and provide stroke education  Monitor for poststroke pain  Eliquis for secondary stroke prophylaxis    Outpatient f/u with Neurology    Fall  Assessment & Plan  - s/p slip fall onto buttocks 7/19  - did not ask for assistance  - Now with bed alarms and 1:1 after failing frequent checks.  - CTH, CT C-spine without acute findings, XR Hip/Pelvis without acute findings  - 2 days later with back pain and found to have possible new wedging of T12 - age indeterminate without fracture line  - Pain control as detailed below.   - neurosurgery recommend- thoracic spine precautions,TLSO brace, repeat TL XR with follow-up in 2 weeks      CHF (congestive heart failure) (HCC)  Assessment & Plan  Wt Readings from Last 3 Encounters:   07/26/23 47.6 kg (104 lb 15 oz)   06/30/23 55.3 kg (122 lb)   06/06/23 47.9 kg (105 lb 9.6 oz)     2/2023 Echo 30% with G2DD, moderate AI  Home: Toprol XL 25mg daily, Aldactone 25mg daily   - Limited by BP - no ACE/ARB   - no SGLT2i due to recurrent UTI  Here: Toprol XL 25mg daily  Unclear etiology of cardiomyopathy  Possible consideration for ischemic work-up outpatient, poor candidate for aggressive procedures   Monitor I/O/daily weights, volume status, lytes. IM consulted to assist with management  Outpatient f/u with Dr. Ismael Cardenas      Dysphagia  Assessment & Plan  2/2 previous CVA earlier this year. Noted to have impaired oropharyngeal swallow  Was on free water protocol at home - held initially due to thrush - which has been treated and is resolved. On Level 1/NTL  - Inpatient SLP recommended VBS  - Consulting SLP here - they did not recommend VBS at this time. - Aspiration precautions  - Strict oral hygiene prior to trials  - esophagogram scheduled for 7/19, unable to complete d/t Aspiration risk    Urinary tract infection without hematuria  Assessment & Plan  Present on admission  With incontinence and confusion  Grew 100k CFU Enterococcus    - Started on Macrobid based on antibiogram on 7/9   - sensitivity confirms susceptibility   - improved symptoms of UTI  Unfortunately confusion and repeat UTI symptoms on 7/20  - 7/20 reports burning with urination, awaiting UA w/reflex, WBC 10-20   - 7/21 - urine culture + > 100K Enterococcus faecalis, started Levaquin  - Would treat for 7 days.  - Urology consulted - no further recs. May be a candidate for prophylactic antibiotics  - Does follow with Urogynecology at [facility] - last seen in 2020.  - Will need re-referral at discharge. Mixed stress and urge urinary incontinence  Assessment & Plan  Frankly incontinent in the hospital  History of pubovaginal sling with [facility]  Initiate timed voids and scans   - PVRs have all been low   - Timed voids help significantly with continence. Has history of recurrent UTIs - see that entry for details  Close continence care      Anemia  Assessment & Plan  2/2 L leg hematoma (most recent imaging negative for extravasation)  At home on eliquis  No reversal inpatient due to recent CVA  Prior to admission Hgb 10-12.    Ravi 7/2 6.8 - given 1 unit pRBC  7/24 Hgb 8.5 > 9.2 > 9.3 > 10.1 > 9.9 > 10.9 > 9.6 > 9.5  Monitor, transfuse as appropriate. IM consulted to assist with management    Cervical dystonia  Assessment & Plan  Recommend outpatient f/u with Dr. Robert Pineda to evaluate for botulinum toxin injections  Appears to have a retrocollis. Sundowning  Assessment & Plan  Does wake up confused at night, but is easily redirectable  - More confusion, than agitation  - Wandered all the way down to the cafeteria on the 1st floor on 7/9  - No injuries  - Brought back to her room  - Found to have a UTI on admission and treated with Macrobid. - Initially was able to wean down from Virtual 1:1 to Frequent Checks. - Unfortunately on 7/19 had another fall with some confusion   - Found to have another UTI and started on enterococcus   - Now on 1:1 indefinitely. - Floor mats. - Delirium precautions    Night terrors  Assessment & Plan  Home: Prazosin  Here: none - held due to BP  Continue melatonin - so far tolerating. Outpatient f/u with PCP    Hypomagnesemia  Assessment & Plan  Home: Mag oxide 800mg BID  Here: Mag oxide 400mg BID  7/9 Mag 1.9   Goals > 2. Sinus tachycardia  Assessment & Plan  Home: Toprol XL 25mg BID  Here: Toprol XL 25mg daily  On 7/6 with HR in 115-120. EKG showed sinus tach. Trops trended and negative. - Associated with transient/mild SOB   - She was only being given Toprol daily in the hospital   - Increased to BID with improvement. Had to be decreased eventually as was not meeting parameters. Lytes with K > 4, Mag > 2  Monitor and adjust regimen as appropriate  IM consulted to assist with management  Outpatient f/u with Dr. Clovis Rodney    Obstructive sleep apnea  Assessment & Plan  Previously non-compliant with hospital or home CPAP  Outpatient f/u with PCP.     Hypercalcemia  Assessment & Plan  Suspect 2/2 primary hyperparathyroidism  7/8 iCal 1.12, Phos 3.3  Home: Sensipar 30mg daily  Here: Same  Recommend outpatient f/u with Endocrinology      HTN (hypertension)  Assessment & Plan  Tends to run on the low/normal side.  Home: Toprol XL 25mg daily  Here: Same  Has not been able to get meds at times due to not meeting parameters. Monitor and adjust as appropriate  - Spironolactone held 7/15 and d/c 7/16  IM consulted to assist with management    Bipolar depression [facility])  Assessment & Plan  Home: Bupropion 100mg BID, Cymbalta 60mg BID, Ativan 0.25mg BID PRN (however this has not been filled since January 2023 as per PDMP)  Here: Bupropion 100mg BID, Cymbalta 60mg BID  Consulting rehab psychology for support  Adjust as appropriate  Outpatient f/u with PCP    Severe protein-calorie malnutrition ([address])  Assessment & Plan  Malnutrition Findings:                                 BMI Findings: Body mass index is 20.89 kg/m². Consulted nutrition    Osteoporosis  Assessment & Plan  Hx L1 compression fx s/p kyphoplasty  Has multiple compression fractures noted on Thoracic spine imaging   - And possibly new anterior wedging of T12 on most recent imaging. Was previously on Prolia - last given in 2020   - Held due to dental issues  Recommend outpatient f/u with Endocrinology  2/23 Vitamin D level is 41.7    Gastroesophageal reflux disease without esophagitis  Assessment & Plan  Home: PPI daily  Here: PPI daily    Seronegative arthropathy of multiple sites [facility])  Assessment & Plan  Follows with Dr. Natasha Anaya with Rheum and is on Hydroxychloroquine 200mg BID. Continue home regimen and follow-up with rheumatology on discharge    Mild intermittent asthma without complication  Assessment & Plan  No acute exacerbation  Home: Albuterol PRN  Here: Same    Chronic pain syndrome  Assessment & Plan  Joint and back pain  Follows with Dr. Dustin Ba with [facility]  Reviewed PDMP - at home on MSIR 7.5mg TID PRN. Here: MSIR 7.5mg TID PRN  - Outpatient f/u with Dr. Dustin Ba. Thrush-resolved as of 7/24/2023  Assessment & Plan  Noted on admission.  Now resolved  7/7 started on nystatin swish/spit  - 7/11 improving  - Continued for at least 7 days with 2-3 days after her antibiotics stop    Appreciate IM consultants medical co-management. Personally reviewed labs, medications, and imaging. ROS:  Review of Systems   A 10 point review of systems was negative except for what is noted in the HPI. OBJECTIVE:   BP 95/60 (BP Location: Right arm)   Pulse 100   Temp 97.5 °F (36.4 °C) (Oral)   Resp 18   Ht 5' 3" (1.6 m)   Wt 47.6 kg (104 lb 15 oz)   SpO2 94%   BMI 18.59 kg/m²     Physical Exam  Constitutional:       Appearance: Normal appearance. HENT:      Head: Normocephalic and atraumatic. Nose: Nose normal.      Mouth/Throat:      Mouth: Mucous membranes are moist.   Cardiovascular:      Rate and Rhythm: Normal rate and regular rhythm. Pulses: Normal pulses. Pulmonary:      Effort: Pulmonary effort is normal.      Breath sounds: Normal breath sounds. Abdominal:      General: Bowel sounds are normal.      Palpations: Abdomen is soft. Musculoskeletal:         General: Tenderness and deformity present. Normal range of motion. Cervical back: Normal range of motion. Comments: +kyphosis, scoliosis   Skin:     General: Skin is warm and dry. Capillary Refill: Capillary refill takes less than 2 seconds. Findings: Bruising present. Neurological:      Mental Status: She is alert and oriented to person, place, and time. Motor: Weakness present.       Gait: Gait abnormal.   Psychiatric:         Mood and Affect: Mood normal.         Judgment: Judgment normal.        Lab Results   Component Value Date    WBC 4.28 (L) 07/24/2023    HGB 9.5 (L) 07/24/2023    HCT 29.7 (L) 07/24/2023    MCV 99 (H) 07/24/2023     (L) 07/24/2023     Lab Results   Component Value Date    SODIUM 144 07/24/2023    K 3.7 07/24/2023     (H) 07/24/2023    CO2 28 07/24/2023    BUN 13 07/24/2023    CREATININE 0.76 07/24/2023    GLUC 87 07/24/2023    CALCIUM 8.3 07/24/2023     Lab Results   Component Value Date    INR 1.33 (H) 07/01/2023    INR 1.65 (H) 06/30/2023    INR 1.66 (H) 04/29/2023    PROTIME 16.7 (H) 07/01/2023    PROTIME 19.5 (H) 06/30/2023    PROTIME 19.9 (H) 04/29/2023           Current Facility-Administered Medications:   •  acetaminophen (TYLENOL) tablet 650 mg, 650 mg, Oral, Q6H [address], Winnie Jalloh MD, 650 mg at 07/26/23 0504  •  albuterol (PROVENTIL HFA,VENTOLIN HFA) inhaler 2 puff, 2 puff, Inhalation, Q6H PRN, Winnie Jalloh MD, 2 puff at 07/25/23 2341  •  apixaban (ELIQUIS) tablet 5 mg, 5 mg, Oral, BID, Winnie Jalloh MD, 5 mg at 07/26/23 1690  •  buPROPion [facility]) tablet 100 mg, 100 mg, Oral, BID, Winnie Jalloh MD, 100 mg at 07/26/23 0847  •  cinacalcet (SENSIPAR) tablet 30 mg, 30 mg, Oral, Daily With Breakfast, Winnie Jalloh MD, 30 mg at 07/26/23 5638  •  Diclofenac Sodium (VOLTAREN) 1 % topical gel 2 g, 2 g, Topical, 4x Daily, Winnie Jalloh MD, 2 g at 07/26/23 0847  •  docusate sodium (COLACE) capsule 100 mg, 100 mg, Oral, BID, Moose Clark MD, 100 mg at 07/26/23 2313  •  DULoxetine (CYMBALTA) delayed release capsule 60 mg, 60 mg, Oral, BID, Winnie Jalloh MD, 60 mg at 07/26/23 6467  •  gabapentin (NEURONTIN) capsule 300 mg, 300 mg, Oral, BID, Moose Clark MD, 300 mg at 07/26/23 8287  •  hydroxychloroquine (PLAQUENIL) tablet 200 mg, 200 mg, Oral, BID With Meals, Winnie Jalloh MD, 200 mg at 07/26/23 0899  •  lamoTRIgine (LaMICtal) tablet 150 mg, 150 mg, Oral, HS, Winnie Jalloh MD, 150 mg at 07/25/23 2114  •  levofloxacin (LEVAQUIN) tablet 250 mg, 250 mg, Oral, Q24H, Ramonita Julian PA-C, 250 mg at 07/25/23 1413  •  lidocaine (LIDODERM) 5 % patch 2 patch, 2 patch, Topical, HS, Winnie Jalloh MD, 2 patch at 07/25/23 2114  •  magnesium Oxide (MAG-OX) tablet 400 mg, 400 mg, Oral, BID, Winnie Jalloh MD, 400 mg at 07/26/23 0843  •  melatonin tablet 3 mg, 3 mg, Oral, HS, Winnie Jalloh MD, 3 mg at 07/25/23 2115  •  metoprolol succinate (TOPROL-XL) 24 hr tablet 12.5 mg, 12.5 mg, Oral, HS, LAURA Rosario  •  morphine (MSIR) IR tablet 7.5 mg, 7.5 mg, Oral, TID PRN, Eleni Cox MD, 7.5 mg at 07/26/23 0843  •  ondansetron (ZOFRAN-ODT) dispersible tablet 4 mg, 4 mg, Oral, Q6H PRN, Sampson Gaffney MD, 4 mg at 07/25/23 1932  •  pantoprazole (PROTONIX) EC tablet 40 mg, 40 mg, Oral, Early Morning, Sampson Gaffney MD, 40 mg at 07/26/23 7584  •  polyethylene glycol (MIRALAX) packet 17 g, 17 g, Oral, Daily PRN, LAURA Rutledge, 17 g at 07/17/23 0934  •  polyethylene glycol (MIRALAX) packet 17 g, 17 g, Oral, Once, LAURA Rutledge  •  saliva substitute (MOUTH KOTE) mucosal solution 5 spray, 5 spray, Mouth/Throat, 4x Daily, LAURA Rosario, 5 spray at 07/26/23 0848  •  senna (SENOKOT) tablet 8.6 mg, 1 tablet, Oral, BID, Eleni Cox MD, 8.6 mg at 07/26/23 3953  •  simethicone (MYLICON) chewable tablet 80 mg, 80 mg, Oral, 4x Daily (with meals and at bedtime), LAURA Rider, 80 mg at 07/26/23 0843    Past Medical History:   Diagnosis Date   • Anxiety    • Asthma    • Bipolar depression ([address])    • Chronic narcotic dependence ([address])    • Chronic pain    • Depression    • Enterovirus heart infection    • SHAR (obstructive sleep apnea)    • Osteoarthritis    • Peripheral neuropathy    • Plantar fasciitis of right foot    • Senile osteoporosis    • Seronegative arthropathy of multiple sites [facility])    • Undifferentiated connective tissue disease ([address])        Patient Active Problem List    Diagnosis Date Noted   • Leg hematoma, left, initial encounter 07/01/2023   • History of stroke 02/28/2023   • Fall 03/02/2022   • CHF (congestive heart failure) ([address])    • Dysphagia 03/09/2023   • Urinary tract infection without hematuria 03/06/2023   • Mixed stress and urge urinary incontinence    • Anemia 06/26/2021   • Cervical dystonia 07/11/2023   • Sundowning 07/09/2023   • Night terrors 07/07/2023   • Cerebral aneurysm, nonruptured 05/15/2023   • Nocturnal hypoxia 05/01/2023   • Stroke-like symptoms 05/01/2023   • Insomnia 04/03/2023   • Hypomagnesemia 03/30/2023   • Sinus tachycardia 03/29/2023   • Hypokalemia 03/10/2023   • AMS (altered mental status) 03/09/2023   • Gout of foot 03/09/2023   • Wound of left ankle 03/09/2023   • Obstructive sleep apnea 02/28/2023   • History of bacteremia 02/24/2023   • Acute CVA (cerebrovascular accident) ([address]) 02/23/2023   • Hypercalcemia 02/22/2023   • Discoloration of skin of foot 02/22/2023   • Abdominal pain 02/22/2023   • HFrEF (heart failure with reduced ejection fraction) ([address]) 02/13/2023   • Stroke ([address]) 02/12/2023   • HTN (hypertension) 02/12/2023   • Cardiomyopathy ([address]) 02/07/2023   • Vitamin D insufficiency 02/07/2023   • LAILA (acute kidney injury) ([address]) 04/23/2022   • Polypharmacy 04/23/2022   • 'light-for-dates' infant with signs of fetal malnutrition 04/22/2022   • Bipolar depression ([address]) 03/04/2022   • Rheumatoid arthritis of multiple sites with negative rheumatoid factor ([address]) 03/04/2022   • Closed fracture of multiple ribs of right side 03/02/2022   • Traumatic pneumothorax 03/02/2022   • Closed fracture of transverse process of lumbar vertebra ([address]) 03/02/2022   • Liver contusion 03/02/2022   • Acute pain due to trauma 03/02/2022   • Right shoulder pain 03/02/2022   • Severe protein-calorie malnutrition ([address]) 03/02/2022   • Undifferentiated connective tissue disease ([address]) 12/29/2021   • Primary generalized (osteo)arthritis 12/29/2021   • Peripheral neuropathy 12/29/2021   • Osteoporosis 12/29/2021   • Hx of compression fracture of spine 12/29/2021   • Lumbar spondylosis 12/29/2021   • Cervical spondylosis 12/29/2021   • Chronic pain syndrome    • Mild intermittent asthma without complication    • Ambulatory dysfunction    • Seronegative arthropathy of multiple sites ([address])    • Gastroesophageal reflux disease without esophagitis         LAURA Loredo  Physical Medicine and Rehabilitation  [facility]

## 2023-07-26 NOTE — PROGRESS NOTES
Internal Medicine Progress Note  Patient: Ajit Toribio  Age/sex: 70 y.o. female  Medical Record #: [id number]      ASSESSMENT/PLAN: (Interval History)  Ajit Toribio is seen and examined and management for following issues:    Left lateral thigh hematoma   • Stable     ABLA  • S/p 1 unit PRBCs 7/2/23  • CBC 7/18/23 stable     Fall  • While getting OOB 7/19  • Did not hit head but unwitnessed  • CTH/CT cerv spine with no acute changes     Fracture T12  • Age indeterminate  • NS saw 7/22 = no procedure/surgery needed; continue TLSO brace; follow-up in 2 weeks with upright thoracolumbar spine x-rays in brace     Right shoulder pain  • Xray done 7/23 was w/o acute      Chronic combined systolic/diastolic heart failure   • Originally diagnosed 2004 when LVEF then was 10-15% and felt to be NICM  • Her LVEF did recover into 50's then fell when she had her CVA in 2/2023 to 30%  • Also has moderate AI/gr 2 DD  • Sees Dr Morgan Taylor a LifeVest in 2/2023 when LVEF was found to be 30% which is most current  • Had been on Coreg in past but HF team switched her to Toprol when she had her stroke 2/2023  • Her soft BPs have limited starting her on ACEI/ARB as OP  • Home:  Toprol XL 25mg BID/Aldactone 25mg qd  • Here:  Toprol XL 12.5 mg qhs  • D/C'd Aldactone 7/16  • Euvolemic  • Toprol held @50% doses = switched to 12.5 mg qhs 7/25 but still held last night     Hypercalcemia  • Continue Cinacalcet here as was taking at home  • Calcium 8.3 on 7/24/23     HS hypoxia   • using 4L NC HS at home  • using O2 at night here     Nightmares  • placed on Minipress when here with us after her CVA  • D/w Dr Laurie Yanes, d/c'd since had been too low to get  • Minipress dc'd 2/2 hypotension     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  • Has residual left sided weakness  • Had recrudescence of CVA 4/2023 with worsening left facial droop  • Continue Eliquis     Dysphagia  • Had admission for aspiration PNA at [facility] 5/2023 tx'd with Levaquin and Flagyl  • Continue pureed diet with NTL as at home  • ST following     Weight loss  • Continue Ensure HP pudding TID and Magic cups TID  • She was on no supplements at home so likely will gain some wt that she is eating/drinking supps here  • adamantly refuses PEG tube  • Nutrition following     Bipolar depression  • Home:  Wellbutrin 100mg BID/Cymbalta 60mg BID/Lamictal 150mg qhs  • Here:  Wellbutrin 100mg BID/Cymbalta 60mg BID/Lamictal 150mg qhs     Rheumatoid arthritis  • Continue Plaquenil 200mg BID as at home     Chronic pain/chronic opioid use  • 2/2 RA and lumbar spondylosis  • Pain management per PMR     Hx incidental right superior hypophyseal artery aneurysm  • Follows with NS Dr Woods Force     CRE urine 3/2023  • Got Cipro then to [facility] for tx at the time  • Maintain contact isolation     Dye allergy   • Was dye prepped on 7/4/23 with Benadryl 50mg IV x 1 w/o issues     Incidental findings  • Pancolitis with increasing inflammatory free fluid in pelvis  • Calcified granuloma RLL     Hypokalemia  • Got Kdur 40 meq x 2 on 7/8/23  • Stable at 3.7. • BMP 7/27     Enterococcus UTI  • S/p Candia Dull Dr Depadua = sensitive  • UA for c/o dysuria again 7/20 = culture again with >100,000 Enterococcus  • Pt has multiple abx allergies. • Started Levaquin 250 mg q24hrs on 7/22/23 which is susceptible. Consider tx for 7-10 days  • Uro saw 7/24/23 = may consider suppressive tx but see her Uro as OP     Discharge date:  7/29/23       The above assessment and plan was reviewed and updated as determined by my evaluation of the patient on 7/26/2023.     Labs:   Results from last 7 days   Lab Units 07/24/23  0510 07/22/23  0900   WBC Thousand/uL 4.28* 7.76   HEMOGLOBIN g/dL 9.5* 9.6*   HEMATOCRIT % 29.7* 30.4*   PLATELETS Thousands/uL 134* 157     Results from last 7 days   Lab Units 07/24/23  0510 07/22/23  0916   SODIUM mmol/L 144 142   POTASSIUM mmol/L 3.7 4.2   CHLORIDE mmol/L 111* 110*   CO2 mmol/L 28 30   BUN mg/dL 13 16   CREATININE mg/dL 0.76 0.92   CALCIUM mg/dL 8.3 8.7                   Review of Scheduled Meds:  Current Facility-Administered Medications   Medication Dose Route Frequency Provider Last Rate   • acetaminophen  650 mg Oral Q6H [address] Christophe Rincon MD     • albuterol  2 puff Inhalation Q6H PRN Christophe Rincon MD     • apixaban  5 mg Oral BID Christophe Rincon MD     • buPROPion  100 mg Oral BID Christophe Rincon MD     • cinacalcet  30 mg Oral Daily With Breakfast Chritsophe Rincon MD     • Diclofenac Sodium  2 g Topical 4x Daily Christophe Rincon MD     • docusate sodium  100 mg Oral BID Eligha Bamberger, MD     • DULoxetine  60 mg Oral BID Christophe Rincon MD     • gabapentin  300 mg Oral BID Eligha Bamberger, MD     • hydroxychloroquine  200 mg Oral BID With Meals Christophe Rincon MD     • lamoTRIgine  150 mg Oral HS Christophe Rincon MD     • levofloxacin  250 mg Oral Q24H Ramonita Julian PA-C     • lidocaine  2 patch Topical HS Christophe Rincon MD     • magnesium Oxide  400 mg Oral BID Christophe Rincon MD     • melatonin  3 mg Oral HS Christophe Rincon MD     • metoprolol succinate  12.5 mg Oral HS LAURA Quintanilla     • morphine  7.5 mg Oral TID PRN Eligha Bamberger, MD     • ondansetron  4 mg Oral Q6H PRN Christophe Rincon MD     • pantoprazole  40 mg Oral Early Morning Christophe Rincon MD     • polyethylene glycol  17 g Oral Daily PRN LAURA Rutledge     • polyethylene glycol  17 g Oral Once LAURA Chinchilla     • saliva substitute  5 spray Mouth/Throat 4x Daily LAURA Quintanilla     • senna  1 tablet Oral BID Eligha Bamberger, MD     • simethicone  80 mg Oral 4x Daily (with meals and at bedtime) LAURA Quintanilla         Subjective/ HPI: Patient seen and examined. Patients overnight issues or events were reviewed with nursing or staff during rounds or morning huddle session.  New or overnight issues include the following:     No new or overnight issues. Offers no complaints    ROS:   A 10 point ROS was performed; negative except as noted above. *Labs /Radiology studies reviewed  *Medications reviewed and reconciled as needed  *Please refer to order section for additional ordered labs studies  *Case discussed with primary attending during morning huddle case rounds    Physical Examination:  Vitals:   Vitals:    07/26/23 0505 07/26/23 0600 07/26/23 0900 07/26/23 1012   BP: 110/70  95/60    BP Location: Right arm  Right arm    Pulse: 100      Resp: 18      Temp: 97.5 °F (36.4 °C)      TempSrc: Oral      SpO2: 94%      Weight:  47.6 kg (104 lb 15 oz)  47.6 kg (104 lb 15 oz)   Height:           General Appearance: no distress, conversive  HEENT:  External ear normal.  Nose normal w/o drainage. Mucous membranes are moist. Oropharynx is clear. Conjunctiva clear w/o icterus or redness. Neck:  Supple, normal ROM  Lungs: BBS without crackles/wheeze/rhonchi; respirations unlabored with normal inspiratory/expiratory effort. No retractions noted. On RA  CV: regular rate and rhythm; no rubs/murmurs/gallops, PMI normal   ABD: Abdomen is soft. Bowel sounds all quadrants. Nontender with no distention. EXT: no edema  Skin: normal turgor, normal texture, no rashes  Psych: affect normal, mood normal  Neuro: AAO     The above physical exam was reviewed and updated as determined by my evaluation of the patient on 7/26/2023. Invasive Devices     None                    VTE Pharmacologic Prophylaxis: Eliquis  Code Status: Level 1 - Full Code  Current Length of Stay: 19 day(s)      Total time spent:  30 minutes with more than 50% spent counseling/coordinating care. Counseling includes discussion with patient re: progress  and discussion with patient of his/her current medical state/information. Coordination of patient's care was performed in conjunction with primary service.  Time invested included review of patient's labs, vitals, and management of their comorbidities with continued monitoring. In addition, this patient was discussed with medical team including physician and advanced extenders. The care of the patient was extensively discussed and appropriate treatment plan was formulated unique for this patient. Medical decision making for the day was made by supervising physician unless otherwise noted in their attestation statement. ** Please Note:  voice to text software may have been used in the creation of this document.  Although proof errors in transcription or interpretation are a potential of such software**

## 2023-07-27 LAB
ANION GAP SERPL CALCULATED.3IONS-SCNC: 1 MMOL/L
BASOPHILS # BLD AUTO: 0.02 THOUSANDS/ÂΜL (ref 0–0.1)
BASOPHILS NFR BLD AUTO: 1 % (ref 0–1)
BUN SERPL-MCNC: 15 MG/DL (ref 5–25)
CALCIUM SERPL-MCNC: 8.1 MG/DL (ref 8.3–10.1)
CHLORIDE SERPL-SCNC: 109 MMOL/L (ref 96–108)
CO2 SERPL-SCNC: 31 MMOL/L (ref 21–32)
CREAT SERPL-MCNC: 0.84 MG/DL (ref 0.6–1.3)
EOSINOPHIL # BLD AUTO: 0 THOUSAND/ÂΜL (ref 0–0.61)
EOSINOPHIL NFR BLD AUTO: 0 % (ref 0–6)
ERYTHROCYTE [DISTWIDTH] IN BLOOD BY AUTOMATED COUNT: 19.5 % (ref 11.6–15.1)
GFR SERPL CREATININE-BSD FRML MDRD: 71 ML/MIN/1.73SQ M
GLUCOSE P FAST SERPL-MCNC: 80 MG/DL (ref 65–99)
GLUCOSE SERPL-MCNC: 80 MG/DL (ref 65–140)
HCT VFR BLD AUTO: 30.8 % (ref 34.8–46.1)
HGB BLD-MCNC: 9.2 G/DL (ref 11.5–15.4)
IMM GRANULOCYTES # BLD AUTO: 0.01 THOUSAND/UL (ref 0–0.2)
IMM GRANULOCYTES NFR BLD AUTO: 0 % (ref 0–2)
LYMPHOCYTES # BLD AUTO: 0.85 THOUSANDS/ÂΜL (ref 0.6–4.47)
LYMPHOCYTES NFR BLD AUTO: 27 % (ref 14–44)
MCH RBC QN AUTO: 30.6 PG (ref 26.8–34.3)
MCHC RBC AUTO-ENTMCNC: 29.9 G/DL (ref 31.4–37.4)
MCV RBC AUTO: 102 FL (ref 82–98)
MONOCYTES # BLD AUTO: 0.29 THOUSAND/ÂΜL (ref 0.17–1.22)
MONOCYTES NFR BLD AUTO: 9 % (ref 4–12)
NEUTROPHILS # BLD AUTO: 1.94 THOUSANDS/ÂΜL (ref 1.85–7.62)
NEUTS SEG NFR BLD AUTO: 63 % (ref 43–75)
NRBC BLD AUTO-RTO: 0 /100 WBCS
PLATELET # BLD AUTO: 160 THOUSANDS/UL (ref 149–390)
PMV BLD AUTO: 10.7 FL (ref 8.9–12.7)
POTASSIUM SERPL-SCNC: 4.4 MMOL/L (ref 3.5–5.3)
RBC # BLD AUTO: 3.01 MILLION/UL (ref 3.81–5.12)
SODIUM SERPL-SCNC: 141 MMOL/L (ref 135–147)
WBC # BLD AUTO: 3.11 THOUSAND/UL (ref 4.31–10.16)

## 2023-07-27 PROCEDURE — 99232 SBSQ HOSP IP/OBS MODERATE 35: CPT | Performed by: PHYSICAL MEDICINE & REHABILITATION

## 2023-07-27 PROCEDURE — 99232 SBSQ HOSP IP/OBS MODERATE 35: CPT | Performed by: INTERNAL MEDICINE

## 2023-07-27 PROCEDURE — 97535 SELF CARE MNGMENT TRAINING: CPT

## 2023-07-27 PROCEDURE — 80048 BASIC METABOLIC PNL TOTAL CA: CPT | Performed by: NURSE PRACTITIONER

## 2023-07-27 PROCEDURE — 83735 ASSAY OF MAGNESIUM: CPT | Performed by: NURSE PRACTITIONER

## 2023-07-27 PROCEDURE — 97530 THERAPEUTIC ACTIVITIES: CPT

## 2023-07-27 PROCEDURE — 97110 THERAPEUTIC EXERCISES: CPT

## 2023-07-27 PROCEDURE — 85025 COMPLETE CBC W/AUTO DIFF WBC: CPT | Performed by: NURSE PRACTITIONER

## 2023-07-27 RX ORDER — CALCIUM CARBONATE 500 MG/1
1000 TABLET, CHEWABLE ORAL 3 TIMES DAILY PRN
Status: DISCONTINUED | OUTPATIENT
Start: 2023-07-27 | End: 2023-07-29 | Stop reason: HOSPADM

## 2023-07-27 RX ORDER — LEVOFLOXACIN 250 MG/1
250 TABLET ORAL EVERY 24 HOURS
Status: COMPLETED | OUTPATIENT
Start: 2023-07-28 | End: 2023-07-28

## 2023-07-27 RX ADMIN — BUPROPION HYDROCHLORIDE 100 MG: 100 TABLET, FILM COATED ORAL at 17:40

## 2023-07-27 RX ADMIN — LEVOFLOXACIN 250 MG: 250 TABLET, FILM COATED ORAL at 11:47

## 2023-07-27 RX ADMIN — MAGNESIUM OXIDE TAB 400 MG (241.3 MG ELEMENTAL MG) 400 MG: 400 (241.3 MG) TAB at 09:01

## 2023-07-27 RX ADMIN — DULOXETINE HYDROCHLORIDE 60 MG: 60 CAPSULE, DELAYED RELEASE ORAL at 09:00

## 2023-07-27 RX ADMIN — ACETAMINOPHEN 650 MG: 325 TABLET, FILM COATED ORAL at 06:11

## 2023-07-27 RX ADMIN — GABAPENTIN 300 MG: 300 CAPSULE ORAL at 09:01

## 2023-07-27 RX ADMIN — ACETAMINOPHEN 650 MG: 325 TABLET, FILM COATED ORAL at 23:41

## 2023-07-27 RX ADMIN — LIDOCAINE 5% 2 PATCH: 700 PATCH TOPICAL at 21:33

## 2023-07-27 RX ADMIN — Medication 5 SPRAY: at 11:44

## 2023-07-27 RX ADMIN — APIXABAN 5 MG: 5 TABLET, FILM COATED ORAL at 17:39

## 2023-07-27 RX ADMIN — Medication 5 SPRAY: at 17:42

## 2023-07-27 RX ADMIN — DICLOFENAC SODIUM 2 G: 10 GEL TOPICAL at 11:44

## 2023-07-27 RX ADMIN — SIMETHICONE 80 MG: 80 TABLET, CHEWABLE ORAL at 16:13

## 2023-07-27 RX ADMIN — MELATONIN TAB 3 MG 3 MG: 3 TAB at 21:39

## 2023-07-27 RX ADMIN — APIXABAN 5 MG: 5 TABLET, FILM COATED ORAL at 09:01

## 2023-07-27 RX ADMIN — PANTOPRAZOLE SODIUM 40 MG: 40 TABLET, DELAYED RELEASE ORAL at 06:11

## 2023-07-27 RX ADMIN — HYDROXYCHLOROQUINE SULFATE 200 MG: 200 TABLET ORAL at 09:02

## 2023-07-27 RX ADMIN — ALBUTEROL SULFATE 2 PUFF: 90 AEROSOL, METERED RESPIRATORY (INHALATION) at 23:42

## 2023-07-27 RX ADMIN — ACETAMINOPHEN 650 MG: 325 TABLET, FILM COATED ORAL at 11:43

## 2023-07-27 RX ADMIN — MORPHINE SULFATE 7.5 MG: 15 TABLET ORAL at 11:43

## 2023-07-27 RX ADMIN — DOCUSATE SODIUM 100 MG: 100 CAPSULE ORAL at 17:39

## 2023-07-27 RX ADMIN — SIMETHICONE 80 MG: 80 TABLET, CHEWABLE ORAL at 09:00

## 2023-07-27 RX ADMIN — CALCIUM CARBONATE (ANTACID) CHEW TAB 500 MG 1000 MG: 500 CHEW TAB at 20:38

## 2023-07-27 RX ADMIN — SIMETHICONE 80 MG: 80 TABLET, CHEWABLE ORAL at 11:43

## 2023-07-27 RX ADMIN — CINACALCET 30 MG: 30 TABLET, FILM COATED ORAL at 09:02

## 2023-07-27 RX ADMIN — LAMOTRIGINE 150 MG: 100 TABLET ORAL at 21:39

## 2023-07-27 RX ADMIN — ACETAMINOPHEN 650 MG: 325 TABLET, FILM COATED ORAL at 17:40

## 2023-07-27 RX ADMIN — DULOXETINE HYDROCHLORIDE 60 MG: 60 CAPSULE, DELAYED RELEASE ORAL at 17:40

## 2023-07-27 RX ADMIN — BUPROPION HYDROCHLORIDE 100 MG: 100 TABLET, FILM COATED ORAL at 09:01

## 2023-07-27 RX ADMIN — METOPROLOL SUCCINATE 12.5 MG: 25 TABLET, FILM COATED, EXTENDED RELEASE ORAL at 21:41

## 2023-07-27 RX ADMIN — MAGNESIUM OXIDE TAB 400 MG (241.3 MG ELEMENTAL MG) 400 MG: 400 (241.3 MG) TAB at 17:40

## 2023-07-27 RX ADMIN — HYDROXYCHLOROQUINE SULFATE 200 MG: 200 TABLET ORAL at 16:14

## 2023-07-27 RX ADMIN — ACETAMINOPHEN 650 MG: 325 TABLET, FILM COATED ORAL at 00:20

## 2023-07-27 RX ADMIN — MORPHINE SULFATE 7.5 MG: 15 TABLET ORAL at 02:48

## 2023-07-27 RX ADMIN — DICLOFENAC SODIUM 2 G: 10 GEL TOPICAL at 17:41

## 2023-07-27 RX ADMIN — GABAPENTIN 300 MG: 300 CAPSULE ORAL at 17:40

## 2023-07-27 RX ADMIN — Medication 5 SPRAY: at 09:01

## 2023-07-27 RX ADMIN — SIMETHICONE 80 MG: 80 TABLET, CHEWABLE ORAL at 21:39

## 2023-07-27 RX ADMIN — Medication 5 SPRAY: at 21:29

## 2023-07-27 RX ADMIN — DICLOFENAC SODIUM 2 G: 10 GEL TOPICAL at 21:29

## 2023-07-27 NOTE — PROGRESS NOTES
Physical Medicine and Rehabilitation Progress Note  Marine Bryan 70 y.o. female MRN: [id number]  Unit/Bed#: -01 Encounter: [id number]    To Review: Marine Bryan is a 70 y.o. female with medical history of HTN, CVA in 2/2023 on eliquis (chronic dysphagia, L sided weakness, cognitive impairment), hypercalcemia,  CHF, GERD, SHAR, gout, bipolar disorder, RA on plaquenil, chronic opioid dependence on morphine for chronic back/joint pain, who presented to the [address] [place] [address] [address] on 6/30 after a fall, unable to get up and disoriented. She thinks she tripped and fell down her porch steps, landing on he L thigh. Found to have a L lateral thigh hematoma with evidence of extravasation on imaging. Binder placed over hips, and eliquis was held but not reversed given her recent stroke. Speech consulted and noted oropharyngeal swallow likely at baseline and recommended Level 1/NTL. Recommended for VBS. Course c/b ABLA on 7/2 Hgb dropped to 6.8 and she was given 1 unit pRBC. Geriatrics was consulted, noted at baseline she is oriented but forgetful, with worsening issues since her stroke. Mini-Cog was 9/15 in 2021. They recommended checking B12. They noted she has not been on lorazepam since January, and also recommended weaning down off gabapentin if possible. On 7/4 patient had an unwitnessed fall while trying to reach for her bag on the window sill. No head strike, but landed on her L hip. GCS 15, Neuro exam was baseline. CTA ordered to look for active bleeding, which showed decreased size of hematoma with no active extravasation. Hgb did drop after her transfusion, but seems to have stablized today at 8.5. That being said, her HR was elevated today, and it appears a troponin has been ordered by the trauma team as well as a CXR. CXR showed hazy RLL opacity, likely 2/2 atelectasis, and trops trended down. Metoprolol was increased with improvement. She was admitted to the [facility] on 7/7. Chief Complaint: Was feeling anxious last night, but doing a bit better today. Very much wants to go home. Interval History/Subjective:  No acute events overnight. Last BM was 7/26. She denies any new CP, SOB, fevers, chills, palpitations, lightheadedness, dizziness. Just generally feeling a bit anxious. Had some R leg pain last night - but denies this AM.    ROS:  A 10 point review of systems was negative except for what is noted in the HPI. Today's Changes:  1. Tachy - did receive Toprol XL last night (lower dose - adjusted by IM). Discussed with IM they will make adjustments. 2. Continue current pain management. 3. CM to speak with daughter, Annia Hodgson, about possible discharge date. 4. Reviewed labs, largely stable. Monitor WBC count. 5. Nausea seems better. Recommended small, frequent meals for patient vs. Large meals. She tolerates this better. Total visit time: 35 minutes, with more than 50% spent counseling/coordinating care. Counseling includes discussion with patient re: progress in therapies, functional issues observed by therapy staff, and discussion with patient regarding their current medical state and wellbeing. Coordination of patient's care was performed in conjunction with Internal Medicine service to monitor patient's labs, vitals, and management of their comorbidities. Assessment/Plan:    * Leg hematoma, left, initial encounter  Assessment & Plan  2/2 fall    6/30 CT CAP:  Large hematoma in the lateral left thigh measuring up to 9.6 cm with focus of active contrast extravasation. No acute intrathoracic or intra-abdominal injury. 7/4 CTA Pelvis:  1. Decreasing size of left lateral thigh hematoma without evidence for active extravasation. 2.  No CT findings in the visualized pelvis and thighs to explain falling hematocrit. 3.  Pancolitis with increasing inflammatory free fluid in the pelvis.  No free air, pneumatosis, or encapsulated collections demonstrated. Non-operatively managed. Did require transfusion x1  Pain management as below  Monitor Hgb. PT/OT 3-5 hours/day, 5-7 days/week  Outpatient f/u with General Surgery      Cervical dystonia  Assessment & Plan  Recommend outpatient f/u with Dr. Mario Stewart to evaluate for botulinum toxin injections  Appears to have a retrocollis. Sundowning  Assessment & Plan  Does wake up confused at night, but is easily redirectable  - More confusion, than agitation  - Wandered all the way down to the cafeteria on the 1st floor on 7/9  - No injuries  - Brought back to her room  - Found to have a UTI on admission and treated with Macrobid. - Initially was able to wean down from Virtual 1:1 to Frequent Checks. - Unfortunately on 7/19 had another fall with some confusion   - Found to have another UTI and started on enterococcus   - Now on 1:1 indefinitely. - Floor mats. - Delirium precautions    Night terrors  Assessment & Plan  Home: Prazosin  Here: none - held due to BP  Continue melatonin - so far tolerating. Outpatient f/u with PCP    Hypomagnesemia  Assessment & Plan  Home: Mag oxide 800mg BID  Here: Mag oxide 400mg BID  7/9 Mag 1.9   Goals > 2. Sinus tachycardia  Assessment & Plan  Home: Toprol XL 25mg BID  Here: Toprol XL 25mg daily  On 7/6 with HR in 115-120. EKG showed sinus tach. Trops trended and negative. - Associated with transient/mild SOB   - She was only being given Toprol daily in the hospital   - Increased to BID with improvement. Had to be decreased eventually as was not meeting parameters. Lytes with K > 4, Mag > 2  Monitor and adjust regimen as appropriate  IM consulted to assist with management  Outpatient f/u with Dr. Master Uribe    Dysphagia  Assessment & Plan  2/2 previous CVA earlier this year. Noted to have impaired oropharyngeal swallow  Was on free water protocol at home - held initially due to thrush - which has been treated and is resolved.   On Level 1/NTL  - Inpatient SLP recommended VBS  - Consulting SLP here - they did not recommend VBS at this time. - Aspiration precautions  - Strict oral hygiene prior to trials  - esophagogram scheduled for 7/19, unable to complete d/t Aspiration risk    Urinary tract infection without hematuria  Assessment & Plan  Present on admission  With incontinence and confusion  Grew 100k CFU Enterococcus    - Started on Macrobid based on antibiogram on 7/9   - sensitivity confirms susceptibility   - improved symptoms of UTI  Unfortunately confusion and repeat UTI symptoms on 7/20  - 7/20 reports burning with urination, awaiting UA w/reflex, WBC 10-20   - 7/21 - urine culture + > 100K Enterococcus faecalis, started Levaquin  - Would treat for 7 days.  - Urology consulted - no further recs. May be a candidate for prophylactic antibiotics  - Does follow with Urogynecology at [facility] - last seen in 2020.  - Will need re-referral at discharge. History of stroke  Assessment & Plan  MRI brain 2/22 - 1.  New small acute /more recent interval lacunar infarction in the right cerebral peduncle (series 4, image 14). 2.  Extensive multifocal large, previously present right middle cerebral artery infarction which is evolving with superimposed areas of evolving hemorrhage likely related to hemorrhagic transformation of subacute right MCA infarction.  No significant   mass effect. 3.  Mild, chronic microangiopathy. S/P thrombectomy 2/12 by Dr Ruby Lee   Secondary stroke prophylaxis at this time with Eliquis 5 mg twice daily   PT, OT, SLP. Review modifiable risk factors and provide stroke education  Monitor for poststroke pain  Eliquis for secondary stroke prophylaxis    Outpatient f/u with Neurology    Obstructive sleep apnea  Assessment & Plan  Previously non-compliant with hospital or home CPAP  Outpatient f/u with PCP.     Hypercalcemia  Assessment & Plan  Suspect 2/2 primary hyperparathyroidism  7/8 iCal 1.12, Phos 3.3  Home: Sensipar 30mg daily  Here: Same  Recommend outpatient f/u with Endocrinology      HTN (hypertension)  Assessment & Plan  Tends to run on the low/normal side. Home: Toprol XL 25mg daily  Here: Toprol XL 12.5mg daily at bedtime. Has not been able to get meds at times due to not meeting parameters. Monitor and adjust as appropriate  - Spironolactone held 7/15 and d/c 7/16  IM consulted to assist with management    Bipolar depression [facility])  Assessment & Plan  Home: Bupropion 100mg BID, Cymbalta 60mg BID, Ativan 0.25mg BID PRN (however this has not been filled since January 2023 as per PDMP)  Here: Bupropion 100mg BID, Cymbalta 60mg BID  Consulting rehab psychology for support  Adjust as appropriate  Outpatient f/u with PCP    Severe protein-calorie malnutrition ([address])  Assessment & Plan  Malnutrition Findings:                                 BMI Findings: Body mass index is 20.89 kg/m². Consulted nutrition    Acute pain of right shoulder  Assessment & Plan  Has significant arthritis on imaging  Examines like mild impingement   - Considerations in therapy reviewed with OT  May benefit from injection to shoulder (either intra-articular, but possibly subacromial). Recommend outpatient f/u with Orthopedics. She would like a new referral to Orthopedics at discharge. Fall  Assessment & Plan  - s/p slip fall onto buttocks 7/19  - did not ask for assistance  - Now with bed alarms and 1:1 after failing frequent checks.  - CTH, CT C-spine without acute findings, XR Hip/Pelvis without acute findings  - 2 days later with back pain and found to have possible new wedging of T12 - age indeterminate without fracture line  - Pain control as detailed below.   - neurosurgery recommend- thoracic spine precautions,TLSO brace, repeat TL XR with follow-up in 2 weeks      Osteoporosis  Assessment & Plan  Hx L1 compression fx s/p kyphoplasty  Has multiple compression fractures noted on Thoracic spine imaging   - And possibly new anterior wedging of T12 on most recent imaging. Was previously on Prolia - last given in 2020   - Held due to dental issues  Recommend outpatient f/u with Endocrinology  2/23 Vitamin D level is 41.7    Anemia  Assessment & Plan  2/2 L leg hematoma (most recent imaging negative for extravasation)  At home on eliquis  No reversal inpatient due to recent CVA  Prior to admission Hgb 10-12. Ravi 7/2 6.8 - given 1 unit pRBC  7/27 Hgb 8.5 > 9.2 > 9.3 > 10.1 > 9.9 > 10.9 > 9.6 > 9.5 > 9.2  Monitor, transfuse as appropriate. IM consulted to assist with management    CHF (congestive heart failure) ([address])  Assessment & Plan  Wt Readings from Last 3 Encounters:   07/27/23 48.4 kg (106 lb 12.8 oz)   06/30/23 55.3 kg (122 lb)   06/06/23 47.9 kg (105 lb 9.6 oz)     2/2023 Echo 30% with G2DD, moderate AI  Home: Toprol XL 25mg daily, Aldactone 25mg daily   - Limited by BP - no ACE/ARB   - no SGLT2i due to recurrent UTI  Here: Toprol XL 12.5mg daily at bedtime. Unclear etiology of cardiomyopathy  Possible consideration for ischemic work-up outpatient, poor candidate for aggressive procedures   Monitor I/O/daily weights, volume status, lytes. IM consulted to assist with management  Outpatient f/u with Dr. Edmond Villafana      Mixed stress and urge urinary incontinence  Assessment & Plan  Frankly incontinent in the hospital  History of pubovaginal sling with [facility]  Initiate timed voids and scans   - PVRs have all been low   - Timed voids help significantly with continence. Has history of recurrent UTIs - see that entry for details  Close continence care      Gastroesophageal reflux disease without esophagitis  Assessment & Plan  Home: PPI daily  Here: PPI daily    Seronegative arthropathy of multiple sites [facility])  Assessment & Plan  Follows with Dr. Koko Roberts with Rheum and is on Hydroxychloroquine 200mg BID.   Continue home regimen and follow-up with rheumatology on discharge    Mild intermittent asthma without complication  Assessment & Plan  No acute exacerbation  Home: Albuterol PRN  Here: Same    Chronic pain syndrome  Assessment & Plan  Joint and back pain  Follows with Dr. Ana Monroe with [facility]  Reviewed PDMP - at home on MSIR 7.5mg TID PRN. Here: MSIR 7.5mg TID PRN  - Outpatient f/u with Dr. Ana Monroe. Thrush-resolved as of 7/24/2023  Assessment & Plan  Noted on admission. Now resolved  7/7 started on nystatin swish/spit  - 7/11 improving  - Continued for at least 7 days with 2-3 days after her antibiotics stop      Health Maintenance  #Delirium/Sleep: At high risk. See cognitive impairment above. #Pain: Tylenol scheduled 650mg Q6hr, Gabapentin 300mg BID (will try to wean down here), Cymbalta 60mg BID, MSIR 7.5mg TID PRN, Lidoderm, Voltaren. #Bowel: Last BM 7/24. PRN bisacodyl suppository/miralax  #Bladder: See incontinence above  #Skin/Pressure Injury Prevention: Turn Q2hr in bed, with weight shifts [id number] in wheelchair. Float heels in bed. #DVT Prophylaxis: Fully anticoagulated on eliquis 5mg BID, SCDs. #GI Prophylaxis: Protonix already for chronic issues. #Code Status: Full Code  #FEN: Level 1/NTL, Ensure HP Pudding- Nicolle Clarkiot at Breakfast/Lunch/Dinner  #Dispo: Team 7/12: ADD to be determined now with new compression fracture and pain and currently treating recurrent UTI.  She has had a decline in function given her pain and new spinal precautions  #f/u: Endocrine, Rheum, Cards, Trauma, PMR, Dr. Ana Monroe for pain management, Urogynecology, Neurology, Neurosurgery, Orthopedics    Objective:    Functional Update:   PT: CGA transfers, CGA bed mobility, CGA ambulation 72' with RW, Marika stairs   OT: Sup eating, Sup grooming, modA bathing, maxA Ub dressing with TLSO, maxA LB dressing, Marika toileting, Marika toilet transfers  SLP: Level 1/NTL    Allergies per EMR    Physical Exam:  Temp:  [97.5 °F (36.4 °C)-98.7 °F (37.1 °C)] 97.5 °F (36.4 °C)  HR:  [] 105  Resp:  [12-18] 18  BP: ()/(60-78) 110/70  Oxygen Therapy  SpO2: 96 %  O2 Flow Rate (L/min): 2 L/min    Gen: No acute distress, frail  HEENT: MM dry, Normocephalic/Atraumatic  Cardiovascular: Sinus tachycardia  Heme/Extr: No edema  Pulmonary: Non-labored breathing. Lungs CTAB  : No lopes  GI: Soft, non-tender, non-distended. BS+  MSK: Neck stable in retrocollis. Pain at her thoracic vertebra at apex of her kyphosis hitting her TLSO. Integumentary: Skin is warm, dry. Neuro: AAOx3, Speech is intact. Appropriate to questioning. Impaired recall. Psych: Normal mood and affect.      Diagnostic Studies: Reviewed  R shoulder XR with arthritis    Laboratory:  Reviewed   Results from last 7 days   Lab Units 07/27/23  0709 07/24/23  0510 07/22/23  0900   HEMOGLOBIN g/dL 9.2* 9.5* 9.6*   HEMATOCRIT % 30.8* 29.7* 30.4*   WBC Thousand/uL 3.11* 4.28* 7.76     Results from last 7 days   Lab Units 07/27/23  0709 07/24/23  0510 07/22/23  0916   BUN mg/dL 15 13 16   POTASSIUM mmol/L 4.4 3.7 4.2   CHLORIDE mmol/L 109* 111* 110*   CREATININE mg/dL 0.84 0.76 0.92   AST U/L  --   --  21   ALT U/L  --   --  15            Patient Active Problem List   Diagnosis   • Chronic pain syndrome   • Mild intermittent asthma without complication   • Ambulatory dysfunction   • Seronegative arthropathy of multiple sites ([facility])   • Gastroesophageal reflux disease without esophagitis   • Mixed stress and urge urinary incontinence   • CHF (congestive heart failure) ([facility])   • Anemia   • Undifferentiated connective tissue disease ([address])   • Primary generalized (osteo)arthritis   • Peripheral neuropathy   • Osteoporosis   • Hx of compression fracture of spine   • Lumbar spondylosis   • Cervical spondylosis   • Closed fracture of multiple ribs of right side   • Traumatic pneumothorax   • Closed fracture of transverse process of lumbar vertebra [facility])   • Liver contusion   • Fall   • Acute pain due to trauma   • Right shoulder pain   • Severe protein-calorie malnutrition (HCC)   • Bipolar depression ([facility])   • Rheumatoid arthritis of multiple sites with negative rheumatoid factor ([address])   • 'light-for-dates' infant with signs of fetal malnutrition   • LAILA (acute kidney injury) ([address])   • Polypharmacy   • Cardiomyopathy ([address])   • Vitamin D insufficiency   • Stroke ([address])   • HTN (hypertension)   • HFrEF (heart failure with reduced ejection fraction) ([facility])   • Hypercalcemia   • Discoloration of skin of foot   • Abdominal pain   • Acute CVA (cerebrovascular accident) ([address])   • History of bacteremia   • Obstructive sleep apnea   • History of stroke   • Urinary tract infection without hematuria   • AMS (altered mental status)   • Gout of foot   • Dysphagia   • Wound of left ankle   • Hypokalemia   • Sinus tachycardia   • Hypomagnesemia   • Insomnia   • Nocturnal hypoxia   • Stroke-like symptoms   • Cerebral aneurysm, nonruptured   • Leg hematoma, left, initial encounter   • Night terrors   • Sundowning   • Cervical dystonia         Medications  Current Facility-Administered Medications   Medication Dose Route Frequency Provider Last Rate   • acetaminophen  650 mg Oral Q6H [address] Ronal Kinney MD     • albuterol  2 puff Inhalation Q6H PRN Ronal Kinney MD     • apixaban  5 mg Oral BID Ronal Kinnye MD     • buPROPion  100 mg Oral BID Ronal Kinney MD     • cinacalcet  30 mg Oral Daily With Breakfast Ronal Kinney MD     • Diclofenac Sodium  2 g Topical 4x Daily Ronal Kinney MD     • docusate sodium  100 mg Oral BID Isela [place] MD     • DULoxetine  60 mg Oral BID Ronal Kinney MD     • gabapentin  300 mg Oral BID Isela [place] MD     • hydroxychloroquine  200 mg Oral BID With Meals Ronal Kinney MD     • lamoTRIgine  150 mg Oral HS Ronal Kinney MD     • levofloxacin  250 mg Oral Q24H Ramonita Julian PA-C     • lidocaine  2 patch Topical HS Ronal Kinney MD     • magnesium Oxide  400 mg Oral BID oRnal Kinney MD     • melatonin  3 mg Oral HS Ronal Kinney MD     • metoprolol succinate  12.5 mg Oral HS Lasha Padilla LAURA Ferguson     • morphine  7.5 mg Oral TID PRN Shorty Dobbins MD     • ondansetron  4 mg Oral Q6H PRN Durga Valladares MD     • pantoprazole  40 mg Oral Early Morning Durga Valladares MD     • polyethylene glycol  17 g Oral Daily PRN LAURA Rutledge     • polyethylene glycol  17 g Oral Once LAURA Bosch     • saliva substitute  5 spray Mouth/Throat 4x Daily LAURA Davis     • senna  1 tablet Oral BID Shorty Dobbins MD     • simethicone  80 mg Oral 4x Daily (with meals and at bedtime) LAURA Davis            ** Please Note: Fluency Direct voice to text software may have been used in the creation of this document.  **

## 2023-07-27 NOTE — PROGRESS NOTES
07/27/23 1400   Pain Assessment   Pain Assessment Tool 0-10   Pain Score 6   Pain Location/Orientation Location: Back   Restrictions/Precautions   Precautions 1:1;Aspiration;Bed/chair alarms;Cognitive; Fall Risk;Impulsive;Pain;Supervision on toilet/commode;Visual deficit   Weight Bearing Restrictions No   ROM Restrictions Yes  (spinal precautions)   Braces or Orthoses TLSO   Cognition   Overall Cognitive Status Impaired   Arousal/Participation Alert; Cooperative   Attention Attends with cues to redirect   Orientation Level Oriented X4   Memory Decreased short term memory;Decreased recall of precautions   Following Commands Follows one step commands with increased time or repetition   Sit to Stand   Type of Assistance Needed Incidental touching   Comment CS/CGA with RW   Sit to Stand CARE Score 4   Bed-Chair Transfer   Type of Assistance Needed Incidental touching   Comment CGA with RW   Chair/Bed-to-Chair Transfer CARE Score 4   Walk 10 Feet   Type of Assistance Needed Supervision   Comment CS with RW   Walk 10 Feet CARE Score 4   Walk 50 Feet with Two Turns   Type of Assistance Needed Incidental touching   Comment CS/CGA with RW   Walk 50 Feet with Two Turns CARE Score 4   Walk 150 Feet   Comment unable to complete secondary to high fatigue level this afternoon   Reason if not Attempted Safety concerns   Walk 150 Feet CARE Score 88   Ambulation   Primary Mode of Locomotion Prior to Admission Walk   Distance Walked (feet) 100 ft  (40)   Assist Device Roller Walker   Gait Pattern Slow Amy; Inconsistant Amy;Narrow AYLA;Step to; Step through; Improper weight shift; Forward Flexion   Limitations Noted In Endurance;Posture; Safety   Provided Assistance with: Balance   Walk Assist Level Contact Guard;Close Supervision   Does the patient walk? 2.  Yes   Picking Up Object   Type of Assistance Needed Supervision   Comment CS with reacher and RW   Picking Up Object CARE Score 4   Therapeutic Interventions   Balance Standing with unilateral UE support via RW and reaching for cones x20, CGA for safety. Assessment   Treatment Assessment Patient participated in brief 30 minutes skilled physical therapy session focusing on transfers, ambulation, and standing balance. Patient will require CS/CGA upon discharge. Ambulation tolerance limited secondary to low back pain and faitgue. Patient would benefit from continued PT services to work on bed mobility, transfers, curb/single step management, gait training with RW, and LE strengthening. Spoke with ERIN who informed PT daughter is aware of patient's new discharge date. Problem List Decreased strength;Decreased endurance; Impaired balance;Decreased mobility; Decreased safety awareness;Pain;Orthopedic restrictions   Barriers to Discharge   (all barriers to DC resolved. Family able to provide assistance at home. )   PT Barriers   Physical Impairment Decreased strength;Decreased range of motion;Decreased endurance;Decreased mobility; Impaired balance;Decreased coordination;Decreased cognition; Impaired judgement   Plan   Treatment/Interventions Functional transfer training;LE strengthening/ROM; Elevations; Therapeutic exercise;Cognitive reorientation;Patient/family training;Equipment eval/education; Bed mobility;Gait training   Progress Progressing toward goals   Recommendation   PT Discharge Recommendation Home with home health rehabilitation   Equipment Recommended Walker   PT Therapy Minutes   PT Time In 1400   PT Time Out 1430   PT Total Time (minutes) 30   PT Mode of treatment - Individual (minutes) 30   PT Mode of treatment - Concurrent (minutes) 0   PT Mode of treatment - Group (minutes) 0   PT Mode of treatment - Co-treat (minutes) 0   PT Mode of Treatment - Total time(minutes) 30 minutes   PT Cumulative Minutes 1293   Therapy Time missed   Time missed?  No     Yi Dinh, PT, DPT

## 2023-07-27 NOTE — PLAN OF CARE
Problem: PAIN - ADULT  Goal: Verbalizes/displays adequate comfort level or baseline comfort level  Description: Interventions:  - Encourage patient to monitor pain and request assistance  - Assess pain using appropriate pain scale  - Administer analgesics based on type and severity of pain and evaluate response  - Implement non-pharmacological measures as appropriate and evaluate response  - Consider cultural and social influences on pain and pain management  - Notify physician/advanced practitioner if interventions unsuccessful or patient reports new pain  Outcome: Progressing     Problem: INFECTION - ADULT  Goal: Absence or prevention of progression during hospitalization  Description: INTERVENTIONS:  - Assess and monitor for signs and symptoms of infection  - Monitor lab/diagnostic results  - Monitor all insertion sites, i.e. indwelling lines, tubes, and drains  - Monitor endotracheal if appropriate and nasal secretions for changes in amount and color  - Hardin appropriate cooling/warming therapies per order  - Administer medications as ordered  - Instruct and encourage patient and family to use good hand hygiene technique  - Identify and instruct in appropriate isolation precautions for identified infection/condition  Outcome: Progressing  Goal: Absence of fever/infection during neutropenic period  Description: INTERVENTIONS:  - Monitor WBC    Outcome: Progressing     Problem: SAFETY ADULT  Goal: Patient will remain free of falls  Description: INTERVENTIONS:  - Educate patient/family on patient safety including physical limitations  - Instruct patient to call for assistance with activity   - Consult OT/PT to assist with strengthening/mobility   - Keep Call bell within reach  - Keep bed low and locked with side rails adjusted as appropriate  - Keep care items and personal belongings within reach  - Initiate and maintain comfort rounds  - Make Fall Risk Sign visible to staff  - Offer Toileting every 2 Hours, in advance of need  - Initiate/Maintain bed/chair alarm  - Obtain necessary fall risk management equipment: alarms; 1:1 cont observation    - Apply yellow socks and bracelet for high fall risk patients  - Consider moving patient to room near nurses station  Outcome: Progressing  Goal: Maintain or return to baseline ADL function  Description: INTERVENTIONS:  -  Assess patient's ability to carry out ADLs; assess patient's baseline for ADL function and identify physical deficits which impact ability to perform ADLs (bathing, care of mouth/teeth, toileting, grooming, dressing, etc.)  - Assess/evaluate cause of self-care deficits   - Assess range of motion  - Assess patient's mobility; develop plan if impaired  - Assess patient's need for assistive devices and provide as appropriate  - Encourage maximum independence but intervene and supervise when necessary  - Involve family in performance of ADLs  - Assess for home care needs following discharge   - Consider OT consult to assist with ADL evaluation and planning for discharge  - Provide patient education as appropriate  Outcome: Progressing  Goal: Maintains/Returns to pre admission functional level  Description: INTERVENTIONS:  - Perform BMAT or MOVE assessment daily.   - Set and communicate daily mobility goal to care team and patient/family/caregiver. - Collaborate with rehabilitation services on mobility goals if consulted  - Perform Range of Motion 3 times a day. - Reposition patient every 2 hours.   - Dangle patient 3 times a day  - Stand patient 3 times a day  - Ambulate patient 3 times a day  - Out of bed to chair 3 times a day   - Out of bed for meals 3 times a day  - Out of bed for toileting  - Record patient progress and toleration of activity level   Outcome: Progressing     Problem: DISCHARGE PLANNING  Goal: Discharge to home or other facility with appropriate resources  Description: INTERVENTIONS:  - Identify barriers to discharge w/patient and caregiver  - Arrange for needed discharge resources and transportation as appropriate  - Identify discharge learning needs (meds, wound care, etc.)  - Arrange for interpretive services to assist at discharge as needed  - Refer to Case Management Department for coordinating discharge planning if the patient needs post-hospital services based on physician/advanced practitioner order or complex needs related to functional status, cognitive ability, or social support system  Outcome: Progressing     Problem: Prexisting or High Potential for Compromised Skin Integrity  Goal: Skin integrity is maintained or improved  Description: INTERVENTIONS:  - Identify patients at risk for skin breakdown  - Assess and monitor skin integrity  - Assess and monitor nutrition and hydration status  - Monitor labs   - Assess for incontinence   - Turn and reposition patient  - Assist with mobility/ambulation  - Relieve pressure over bony prominences  - Avoid friction and shearing  - Provide appropriate hygiene as needed including keeping skin clean and dry  - Evaluate need for skin moisturizer/barrier cream  - Collaborate with interdisciplinary team   - Patient/family teaching  - Consider wound care consult   Outcome: Progressing     Problem: Nutrition/Hydration-ADULT  Goal: Nutrient/Hydration intake appropriate for improving, restoring or maintaining nutritional needs  Description: Monitor and assess patient's nutrition/hydration status for malnutrition. Collaborate with interdisciplinary team and initiate plan and interventions as ordered. Monitor patient's weight and dietary intake as ordered or per policy. Utilize nutrition screening tool and intervene as necessary. Determine patient's food preferences and provide high-protein, high-caloric foods as appropriate.      INTERVENTIONS:  - Monitor oral intake, urinary output, labs, and treatment plans  - Assess nutrition and hydration status and recommend course of action  - Evaluate amount of meals eaten  - Assist patient with eating if necessary   - Allow adequate time for meals  - Recommend/ encourage appropriate diets, oral nutritional supplements, and vitamin/mineral supplements  - Order, calculate, and assess calorie counts as needed  - Recommend, monitor, and adjust tube feedings and TPN/PPN based on assessed needs  - Assess need for intravenous fluids  - Provide specific nutrition/hydration education as appropriate  - Include patient/family/caregiver in decisions related to nutrition  Outcome: Progressing     Problem: MOBILITY - ADULT  Goal: Maintain or return to baseline ADL function  Description: INTERVENTIONS:  -  Assess patient's ability to carry out ADLs; assess patient's baseline for ADL function and identify physical deficits which impact ability to perform ADLs (bathing, care of mouth/teeth, toileting, grooming, dressing, etc.)  - Assess/evaluate cause of self-care deficits   - Assess range of motion  - Assess patient's mobility; develop plan if impaired  - Assess patient's need for assistive devices and provide as appropriate  - Encourage maximum independence but intervene and supervise when necessary  - Involve family in performance of ADLs  - Assess for home care needs following discharge   - Consider OT consult to assist with ADL evaluation and planning for discharge  - Provide patient education as appropriate  Outcome: Progressing       Problem: Nutrition/Hydration-ADULT  Goal: Nutrient/Hydration intake appropriate for improving, restoring or maintaining nutritional needs  Description: Monitor and assess patient's nutrition/hydration status for malnutrition. Collaborate with interdisciplinary team and initiate plan and interventions as ordered. Monitor patient's weight and dietary intake as ordered or per policy. Utilize nutrition screening tool and intervene as necessary. Determine patient's food preferences and provide high-protein, high-caloric foods as appropriate. INTERVENTIONS:  - Monitor oral intake, urinary output, labs, and treatment plans  - Assess nutrition and hydration status and recommend course of action  - Evaluate amount of meals eaten  - Assist patient with eating if necessary   - Allow adequate time for meals  - Recommend/ encourage appropriate diets, oral nutritional supplements, and vitamin/mineral supplements  - Order, calculate, and assess calorie counts as needed  - Recommend, monitor, and adjust tube feedings and TPN/PPN based on assessed needs  - Assess need for intravenous fluids  - Provide specific nutrition/hydration education as appropriate  - Include patient/family/caregiver in decisions related to nutrition  Outcome: Progressing

## 2023-07-27 NOTE — CASE MANAGEMENT
Phone call placed to pts dtr porter to confirm potential dc for Saturday. She said she had been made aware from therapy when she called her mom and they were in the room. Cm confirmed arrangements with [facility]. John Webb is asking if dc can be at 10am. Dr Abelino Castellon made aware. John Webb asked if pt would be coming home with medications, cm clarified if they are going to be filled at [facility]. John Webb though pt would have pills from the floor sent home, and cm stated that isn't something that is provided. John Webb said she wasn't sure her mom had eliquis at home and her insurance doesn't cover them. They are still waiting for cardiology to figure out what they want her on. John Webb stated she would phone their pcp to get samples. Dr Abelino Castellon made aware.

## 2023-07-27 NOTE — NURSING NOTE
Pt stated that she has pain in her right calf '' she said has a DVT". I assessed the site for swelling, warmth,redness,tenderness. It was negative. I informed her to let me know if the pain gets worse so I can notify the DR about the pain in leg. I will continue to monitor for any increased pain in her calf. Pt is in no obvious distress.

## 2023-07-27 NOTE — PROGRESS NOTES
07/27/23 0700   Pain Assessment   Pain Assessment Tool 0-10   Pain Score No Pain   Restrictions/Precautions   Precautions 1:1;Aspiration;Bed/chair alarms;Cognitive; Fall Risk;Impulsive;Pain;Supervision on toilet/commode;Visual deficit   Weight Bearing Restrictions No   ROM Restrictions Yes  (spinal precautions)   Braces or Orthoses TLSO   Lifestyle   Autonomy "I can't wait"   Eating   Type of Assistance Needed Supervision   Physical Assistance Level No physical assistance   Comment VCs for alternating solids, liquids and small bites. Eating CARE Score 4   Shower/Bathe Self   Type of Assistance Needed Physical assistance;Verbal cues   Physical Assistance Level 26%-50%   Comment VCs to adhere to spinal precautions during sponge bathing routine. pt requires assist to bathe feet and CG in stance when bathing rear. Shower/Bathe Self CARE Score 3   Upper Body Dressing   Type of Assistance Needed Physical assistance;Verbal cues   Physical Assistance Level 51%-75%   Comment assist to thread LUE in bra and shirt, able to thread RUE and head and adjust over trunk. TA for managing TLSO   Upper Body Dressing CARE Score 2   Lower Body Dressing   Type of Assistance Needed Physical assistance;Verbal cues; Adaptive equipment   Physical Assistance Level 51%-75%   Comment seated in w/c with use of reacher to thread underwear/pants with inc assist to full thread LLE, stands with CG for CM with assist for L side. VCs to adhere to spinal precautions. Lower Body Dressing CARE Score 2   Putting On/Taking Off Footwear   Type of Assistance Needed Physical assistance;Verbal cues; Adaptive equipment   Physical Assistance Level 76% or more   Comment pt able to use reacher to doff socks; TA required to don   Putting On/Taking Off Footwear CARE Score 2   Sit to Stand   Type of Assistance Needed Physical assistance   Physical Assistance Level 25% or less   Comment from varying surface heights, inc time and multiple attempts at times for STS transfers with RW. Sit to Stand CARE Score 3   Bed-Chair Transfer   Type of Assistance Needed Incidental touching   Physical Assistance Level No physical assistance   Comment with RW   Chair/Bed-to-Chair Transfer CARE Score 4   Toileting Hygiene   Type of Assistance Needed Incidental touching   Physical Assistance Level No physical assistance   Comment seated to complete bladder hygiene, CG in stance for CM   Toileting Hygiene CARE Score 4   Toilet Transfer   Type of Assistance Needed Physical assistance   Physical Assistance Level 25% or less   Comment to get up off lower toilet; RW   Toilet Transfer CARE Score 3   Cognition   Overall Cognitive Status Impaired   Arousal/Participation Alert; Cooperative   Attention Attends with cues to redirect   Orientation Level Oriented X4   Memory Decreased short term memory;Decreased recall of precautions   Following Commands Follows one step commands with increased time or repetition   Activity Tolerance   Activity Tolerance Patient tolerated treatment well   Assessment   Treatment Assessment pt engages in 90 minute skilled OT Session focusing on sponge bathing routine, func transfers. see above for full func details. pt continues to require ext assist for self care tasks due to newer spinal precautions, even with LHAE, pt continues to require assist for full use with VCs to adhere to spinal precautions. phone called placed to pt's daughter Sly Gonsalez to relay d/c date of Saturday with daughter in agreement--reports she will call her brother (pt's son) of update. of note, pt provided breakfast tray in which tray slip states chopped diet; spoke with RN who confirms physician order for dysphagia 3 diet however then confirmed with SLP Ernestina Dsouza that pt remains on PUREE and NECTAR THICK diet, physician to change order revert order back to above.  recommend continued skilled care to focus on d/c ADL, func transfers, standing karishma/bal, LUE NMR, func cog/safety, in order to decrease burden of care at d/c. Prognosis Fair   Problem List Decreased strength;Decreased endurance; Impaired balance;Decreased mobility; Decreased safety awareness;Pain;Orthopedic restrictions   Barriers to Discharge Inaccessible home environment;Decreased caregiver support   Plan   Treatment/Interventions ADL retraining;Functional transfer training; Therapeutic exercise; Endurance training;Cognitive reorientation;Patient/family training;Equipment eval/education; Compensatory technique education   OT Therapy Minutes   OT Time In 0700   OT Time Out 0830   OT Total Time (minutes) 90   OT Mode of treatment - Individual (minutes) 90   OT Mode of treatment - Concurrent (minutes) 0   OT Mode of treatment - Group (minutes) 0   OT Mode of treatment - Co-treat (minutes) 0   OT Mode of Treatment - Total time(minutes) 90 minutes   OT Cumulative Minutes 1598   Therapy Time missed   Time missed?  No

## 2023-07-27 NOTE — ASSESSMENT & PLAN NOTE
Has arthritis on imaging  Dur like mild impingement   - Considerations in therapy reviewed with OT  May benefit from injection to shoulder  intra-articular, but possibly subacromial if no improvement with therapies). Recommend outpatient f/u with Orthopedics. She would like a new referral to Orthopedics at discharge.

## 2023-07-27 NOTE — PROGRESS NOTES
Internal Medicine Progress Note  Patient: Guy Zuñiga  Age/sex: 70 y.o. female  Medical Record #: [id number]      ASSESSMENT/PLAN: (Interval History)  Guy Zuñiga is seen and examined and management for following issues:    Left lateral thigh hematoma   • Stable     ABLA  • S/p 1 unit PRBCs 7/2/23  • CBC 7/18/23 stable     Fall  • While getting OOB 7/19  • Did not hit head but unwitnessed  • CTH/CT cerv spine with no acute changes     Fracture T12  • Age indeterminate  • NS saw 7/22 = no procedure/surgery needed; continue TLSO brace; follow-up in 2 weeks with upright thoracolumbar spine x-rays in brace     Right shoulder pain  • [facility] [place] 7/23 was w/o acute      Chronic combined systolic/diastolic heart failure   • Originally diagnosed 2004 when LVEF then was 10-15% and felt to be NICM  • Her LVEF did recover into 50's then fell when she had her CVA in 2/2023 to 30%  • Also has moderate AI/gr 2 DD  • Sees Dr Jenny Bryan a LifeVest in 2/2023 when LVEF was found to be 30% which is most current  • Had been on Coreg in past but HF team switched her to Toprol when she had her stroke 2/2023  • Her soft BPs have limited starting her on ACEI/ARB as OP  • Home:  Toprol XL 25mg BID/Aldactone 25mg qd  • Here:  Toprol XL 12.5 mg qhs  • D/C'd Aldactone 7/16  • Euvolemic  • Toprol held @50% doses so switched to 12.5 mg qhs 7/25; held 7/25, given last night.   Has some intermitt mild tachycardia (is regular apically) but cant go up on the Toprol 2/2 lower BPs     Hypercalcemia  • Continue Cinacalcet here as was taking at home  • Calcium 8.3 on 7/24/23     HS hypoxia   • using 4L NC HS at home  • using O2 at night here     Nightmares  • placed on Minipress when here with us after her CVA  • D/w Dr Kirk Lynne, d/c'd since had been too low to get  • Minipress dc'd 2/2 hypotension     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  • Has residual left sided weakness  • Had recrudescence of CVA 4/2023 with worsening left facial droop  • Continue Eliquis     Dysphagia  • Had admission for aspiration PNA at [facility] 5/2023 tx'd with Levaquin and Flagyl  • Continue pureed diet with NTL as at home  • ST following     Weight loss  • Continue Ensure HP pudding TID and Magic cups TID  • She was on no supplements at home so likely will gain some wt that she is eating/drinking supps here  • adamantly refuses PEG tube  • Nutrition following     Bipolar depression  • Home:  Wellbutrin 100mg BID/Cymbalta 60mg BID/Lamictal 150mg qhs  • Here:  Wellbutrin 100mg BID/Cymbalta 60mg BID/Lamictal 150mg qhs     Rheumatoid arthritis  • Continue Plaquenil 200mg BID as at home     Chronic pain/chronic opioid use  • 2/2 RA and lumbar spondylosis  • Pain management per PMR     Hx incidental right superior hypophyseal artery aneurysm  • Follows with NS Dr Estelle Tavera     CRE urine 3/2023  • Got Cipro then to [facility] for tx at the time  • Maintain contact isolation     Dye allergy   • Was dye prepped on 7/4/23 with Benadryl 50mg IV x 1 w/o issues     Incidental findings  • Pancolitis with increasing inflammatory free fluid in pelvis  • Calcified granuloma RLL     Hypokalemia  • resolved     Enterococcus UTI  • S/p Macrobid per Dr Depadua = sensitive  • UA for c/o dysuria again 7/20 = culture again with >100,000 Enterococcus  • Pt has multiple abx allergies. • Started Levaquin 250 mg q24hrs on 7/22/23 which is susceptible. will tx for 7 days with LD for 7/28  • Uro saw 7/24/23 = may consider suppressive tx but see her Uro as OP     Discharge date:  7/29/23       The above assessment and plan was reviewed and updated as determined by my evaluation of the patient on 7/27/2023.     Labs:   Results from last 7 days   Lab Units 07/27/23  0709 07/24/23  0510   WBC Thousand/uL 3.11* 4.28*   HEMOGLOBIN g/dL 9.2* 9.5*   HEMATOCRIT % 30.8* 29.7*   PLATELETS Thousands/uL 160 134*     Results from last 7 days   Lab Units 07/27/23  0709 07/24/23  0510   SODIUM mmol/L 141 144 POTASSIUM mmol/L 4.4 3.7   CHLORIDE mmol/L 109* 111*   CO2 mmol/L 31 28   BUN mg/dL 15 13   CREATININE mg/dL 0.84 0.76   CALCIUM mg/dL 8.1* 8.3                   Review of Scheduled Meds:  Current Facility-Administered Medications   Medication Dose Route Frequency Provider Last Rate   • acetaminophen  650 mg Oral Q6H [facility] Winnie Jalloh MD     • albuterol  2 puff Inhalation Q6H PRN Winnie Jalloh MD     • apixaban  5 mg Oral BID Winnie Jalloh MD     • buPROPion  100 mg Oral BID Winnie Jalloh MD     • cinacalcet  30 mg Oral Daily With Breakfast Winnie Jalloh MD     • Diclofenac Sodium  2 g Topical 4x Daily Winnie Jalloh MD     • docusate sodium  100 mg Oral BID Moose Clark MD     • DULoxetine  60 mg Oral BID Winnie Jalloh MD     • gabapentin  300 mg Oral BID Moose Clark MD     • hydroxychloroquine  200 mg Oral BID With Meals Winnie Jalloh MD     • lamoTRIgine  150 mg Oral HS Winnie Jalloh MD     • levofloxacin  250 mg Oral Q24H Ramonita Julian PA-C     • lidocaine  2 patch Topical HS Winnie Jalloh MD     • magnesium Oxide  400 mg Oral BID Winnie Jalloh MD     • melatonin  3 mg Oral HS Winnie Jalloh MD     • metoprolol succinate  12.5 mg Oral HS LAURA Mixon     • morphine  7.5 mg Oral TID PRN Moose Clark MD     • ondansetron  4 mg Oral Q6H PRN Winnie Jalloh MD     • pantoprazole  40 mg Oral Early Morning Winnie Jalloh MD     • polyethylene glycol  17 g Oral Daily PRN LAURA Rutledge     • polyethylene glycol  17 g Oral Once LAURA Betancourt     • saliva substitute  5 spray Mouth/Throat 4x Daily LAURA Mixon     • senna  1 tablet Oral BID Moose Clark MD     • simethicone  80 mg Oral 4x Daily (with meals and at bedtime) LAURA Mixon         Subjective/ HPI: Patient seen and examined. Patients overnight issues or events were reviewed with nursing or staff during rounds or morning huddle session.  New or overnight issues include the following:     No new or overnight issues. Offers no complaints    ROS:   A 10 point ROS was performed; negative except as noted above. Imaging:     XR shoulder 2+ vw right   Final Result by Amparo Vasques MD (07/26 7951)      No acute osseous abnormality. Workstation performed: [id number]         XR spine thoracic 3 vw   Final Result by Shani Snider MD (07/21 1706)      1. Mild anterior wedging of T12, not present on the recent prior CT. A fracture line is not visualized and this is age-indeterminate. 2.  Otherwise stable chronic compression fractures at T9, T11, and L1 with kyphoplasty at L1.      3.  Kyphosis at the thoracolumbar junction. The study was marked in [facility] for immediate notification. Workstation performed: [id number]         XR spine lumbar 2 or 3 views injury   Final Result by Shani Snider MD (07/21 1708)      No acute fracture. Stable chronic compression deformities at L1 and L4. Workstation performed: [id number]         CT head wo contrast   Final Result by Christian Francisco MD (07/19 2025)      No interval change. No acute intracranial hemorrhage or depressed calvarial fracture identified. Chronic right MCA distribution infarct. Workstation performed: [id number]         CT spine cervical wo contrast   Final Result by Christian Francisco MD (07/19 2029)      No acute fracture or evidence for traumatic malalignment. No interval change since prior exam.                  Workstation performed: [id number]         XR hip/pelv 2-3 vws left if performed   Final Result by Wilson Sena MD (07/19 1856)      No acute osseous abnormality.             Workstation performed: [id number]         XR spine thoracolumbar 2 vw    (Results Pending)       *Labs /Radiology studies reviewed  *Medications reviewed and reconciled as needed  *Please refer to order section for additional ordered labs studies  *Case discussed with primary attending during morning huddle case rounds    Physical Examination:  Vitals:   Vitals:    07/26/23 1900 07/26/23 2153 07/27/23 0600 07/27/23 0732   BP: 100/70 120/78 110/70    BP Location: Right arm  Right arm    Pulse: (!) 114 100 (!) 128 105   Resp: 12  18    Temp: 97.5 °F (36.4 °C)  97.5 °F (36.4 °C)    TempSrc: Axillary  Axillary    SpO2:   96%    Weight:   48.4 kg (106 lb 12.8 oz)    Height:           General Appearance: no distress, conversive  HEENT: PERRLA, conjuctiva normal; oropharynx clear; mucous membranes moist   Neck:  Supple, normal ROM  Lungs: CTA, normal respiratory effort, no retractions, expiratory effort normal  CV: regular rate and rhythm; no rubs/murmurs/gallops, PMI normal   ABD: soft; ND/NT; +BS  EXT: no edema  Skin: normal turgor, normal texture, no rashes  Psych: affect normal, mood normal  Neuro: AAO      The above physical exam was reviewed and updated as determined by my evaluation of the patient on 7/27/2023. Invasive Devices     None                    VTE Pharmacologic Prophylaxis: Eliquis  Code Status: Level 1 - Full Code  Current Length of Stay: 20 day(s)      Total time spent:  30 minutes with more than 50% spent counseling/coordinating care. Counseling includes discussion with patient re: progress  and discussion with patient of his/her current medical state/information. Coordination of patient's care was performed in conjunction with primary service. Time invested included review of patient's labs, vitals, and management of their comorbidities with continued monitoring. In addition, this patient was discussed with medical team including physician and advanced extenders. The care of the patient was extensively discussed and appropriate treatment plan was formulated unique for this patient. Medical decision making for the day was made by supervising physician unless otherwise noted in their attestation statement.     ** Please Note:  voice to text software may have been used in the creation of this document.  Although proof errors in transcription or interpretation are a potential of such software**

## 2023-07-27 NOTE — PROGRESS NOTES
Pastoral Care Progress Note    2023  Patient: Jong Meyers : 1955  Admission Date & Time: 2023 1157  MRN: [id number] CSN: [id number]          Visited Pt, encouraged her and provided emotional support, prayed for her healing.  remains available.

## 2023-07-27 NOTE — PROGRESS NOTES
07/27/23 1200   Pain Assessment   Pain Assessment Tool 0-10   Pain Score 6   Pain Location/Orientation Location: Back   Restrictions/Precautions   Precautions 1:1;Aspiration;Bed/chair alarms;Cognitive; Fall Risk;Impulsive;Pain;Supervision on toilet/commode;Visual deficit   Weight Bearing Restrictions No   ROM Restrictions Yes  (spinal precautions)   Braces or Orthoses TLSO   Cognition   Overall Cognitive Status Impaired   Arousal/Participation Alert; Cooperative   Attention Attends with cues to redirect   Orientation Level Oriented X4   Memory Decreased short term memory;Decreased recall of precautions   Following Commands Follows one step commands with increased time or repetition   Subjective   Subjective Patient reports she is doing well.  Excited to go home this weekend   Sit to Lying   Type of Assistance Needed Physical assistance   Physical Assistance Level 25% or less   Comment Fei for mangement of LEs   Sit to Lying CARE Score 3   Lying to Sitting on Side of Bed   Type of Assistance Needed Physical assistance   Physical Assistance Level 25% or less   Comment Fei for management of LEs   Lying to Sitting on Side of Bed CARE Score 3   Sit to Stand   Type of Assistance Needed Physical assistance   Physical Assistance Level 25% or less   Comment varied between CGA and CS with RW. 1 incidence of needing Fei due to LOB posteriorly   Sit to Stand CARE Score 3   Bed-Chair Transfer   Type of Assistance Needed Incidental touching   Comment CGA with RW   Chair/Bed-to-Chair Transfer CARE Score 4   Car Transfer   Type of Assistance Needed Physical assistance   Physical Assistance Level 25% or less   Comment Fei for LE management to get into the car, Supervision to get out of the car   Car Transfer CARE Score 3   Walk 10 Feet   Type of Assistance Needed Supervision   Comment CS with RW   Walk 10 Feet CARE Score 4   Walk 50 Feet with Two Turns   Type of Assistance Needed Incidental touching   Comment CS/CGA with RW Walk 50 Feet with Two Turns CARE Score 4   Walk 150 Feet   Type of Assistance Needed Incidental touching   Comment CS with RW, VCs to stay within RW frame   Walk 150 Feet CARE Score 4   Walking 10 Feet on Uneven Surfaces   Type of Assistance Needed Incidental touching   Comment CGA with RW   Walking 10 Feet on Uneven Surfaces CARE Score 4   Ambulation   Primary Mode of Locomotion Prior to Admission Walk   Distance Walked (feet) 65 ft  (150)   Assist Device Roller Walker   Gait Pattern Slow Amy; Inconsistant Amy;Narrow AYLA;Step to; Step through; Improper weight shift; Forward Flexion   Limitations Noted In Endurance;Posture; Safety   Provided Assistance with: Balance   Walk Assist Level Close Supervision;Contact Guard   Does the patient walk? 2. Yes   Wheel 50 Feet with Two Turns   Reason if not Attempted Activity not applicable   Wheel 50 Feet with Two Turns CARE Score 9   Wheel 150 Feet   Reason if not Attempted Activity not applicable   Wheel 047 Feet CARE Score 9   Curb or Single Stair   Style negotiated Single stair   Type of Assistance Needed Physical assistance   Physical Assistance Level 25% or less   Comment Fei for mangement of RW, pt requires assistance to lift the RW onto the step   1 Step (Curb) CARE Score 3   Picking Up Object   Type of Assistance Needed Supervision   Comment CS with reacher and RW   Picking Up Object CARE Score 4   Therapeutic Interventions   Strengthening Standing Marching 2x10, Standing Hip Abduction 2x10, Stand Hip Extension 2x10 each LE   Assessment   Treatment Assessment Patient participated in 60 minute skilled physical therapy session focusing on reviewing overall functional mobility in preperation for upcoming discharge on Saturday, 7/29 and LE strengthening. Patient's transfer status can flucutate based on fatigue level and cognition.  Patient mostly required CS/CGA for STS but did have 1 small LOB requiring Fei from therapist. Recommending patient is Supervision/CGA at home for all functional mobility. [facility]. Patient would benefit from continued PT services to work on bed mobility, transfers, curb/single step management, gait training with RW, and LE strengthening. Spoke with ERIN who informed PT daughter is aware of patient's new discharge date. Problem List Decreased strength;Decreased endurance; Impaired balance;Decreased mobility; Decreased safety awareness;Pain;Orthopedic restrictions   Barriers to Discharge   (all barrier to DC resolved. )   PT Barriers   Functional Limitation Car transfers;Stair negotiation;Standing;Transfers; Walking   Plan   Treatment/Interventions Functional transfer training;LE strengthening/ROM; Elevations; Therapeutic exercise; Endurance training;Patient/family training;Equipment eval/education; Bed mobility;Gait training   Recommendation   PT Discharge Recommendation Home with home health rehabilitation   Equipment Recommended Walker   PT Therapy Minutes   PT Time In 1200   PT Time Out 1300   PT Total Time (minutes) 60   PT Mode of treatment - Individual (minutes) 60   PT Mode of treatment - Concurrent (minutes) 0   PT Mode of treatment - Group (minutes) 0   PT Mode of treatment - Co-treat (minutes) 0   PT Mode of Treatment - Total time(minutes) 60 minutes   PT Cumulative Minutes 1263   Therapy Time missed   Time missed?  No     Collette Kuo, PT, DPT

## 2023-07-28 ENCOUNTER — TELEPHONE (OUTPATIENT)
Dept: CARDIOLOGY CLINIC | Facility: CLINIC | Age: 68
End: 2023-07-28

## 2023-07-28 ENCOUNTER — TELEPHONE (OUTPATIENT)
Dept: NEUROSURGERY | Facility: CLINIC | Age: 68
End: 2023-07-28

## 2023-07-28 ENCOUNTER — TELEPHONE (OUTPATIENT)
Dept: NEUROLOGY | Facility: CLINIC | Age: 68
End: 2023-07-28

## 2023-07-28 ENCOUNTER — APPOINTMENT (INPATIENT)
Dept: NON INVASIVE DIAGNOSTICS | Facility: HOSPITAL | Age: 68
DRG: 949 | End: 2023-07-28
Payer: MEDICARE
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
## 2023-07-28 LAB
MAGNESIUM SERPL-MCNC: 1.9 MG/DL (ref 1.6–2.6)
SL CV LV EF: 30
TR MAX PG: 42 MMHG
TR PEAK VELOCITY: 3.2 M/S
TRICUSPID VALVE PEAK REGURGITATION VELOCITY: 3.23 M/S

## 2023-07-28 PROCEDURE — 97530 THERAPEUTIC ACTIVITIES: CPT

## 2023-07-28 PROCEDURE — 97535 SELF CARE MNGMENT TRAINING: CPT

## 2023-07-28 PROCEDURE — 93325 DOPPLER ECHO COLOR FLOW MAPG: CPT

## 2023-07-28 PROCEDURE — 93321 DOPPLER ECHO F-UP/LMTD STD: CPT | Performed by: INTERNAL MEDICINE

## 2023-07-28 PROCEDURE — 99233 SBSQ HOSP IP/OBS HIGH 50: CPT | Performed by: PHYSICAL MEDICINE & REHABILITATION

## 2023-07-28 PROCEDURE — 93308 TTE F-UP OR LMTD: CPT | Performed by: INTERNAL MEDICINE

## 2023-07-28 PROCEDURE — 93321 DOPPLER ECHO F-UP/LMTD STD: CPT

## 2023-07-28 PROCEDURE — 99232 SBSQ HOSP IP/OBS MODERATE 35: CPT | Performed by: INTERNAL MEDICINE

## 2023-07-28 PROCEDURE — 92526 ORAL FUNCTION THERAPY: CPT

## 2023-07-28 PROCEDURE — 93308 TTE F-UP OR LMTD: CPT

## 2023-07-28 PROCEDURE — 93325 DOPPLER ECHO COLOR FLOW MAPG: CPT | Performed by: INTERNAL MEDICINE

## 2023-07-28 RX ORDER — CINACALCET 30 MG/1
30 TABLET, FILM COATED ORAL
Qty: 30 TABLET | Refills: 0 | Status: ON HOLD | OUTPATIENT
Start: 2023-07-28

## 2023-07-28 RX ORDER — MORPHINE SULFATE 15 MG/1
7.5 TABLET ORAL 2 TIMES DAILY PRN
Refills: 0 | Status: ON HOLD
Start: 2023-07-28

## 2023-07-28 RX ORDER — METOPROLOL SUCCINATE 25 MG/1
25 TABLET, EXTENDED RELEASE ORAL
Status: DISCONTINUED | OUTPATIENT
Start: 2023-07-28 | End: 2023-07-29 | Stop reason: HOSPADM

## 2023-07-28 RX ORDER — MAGNESIUM OXIDE 400 MG/1
400 TABLET ORAL 2 TIMES DAILY
Qty: 60 TABLET | Refills: 0 | Status: ON HOLD | OUTPATIENT
Start: 2023-07-28

## 2023-07-28 RX ORDER — MAGNESIUM OXIDE 400 MG/1
TABLET ORAL
Qty: 120 TABLET | Refills: 0 | OUTPATIENT
Start: 2023-07-28

## 2023-07-28 RX ORDER — LAMOTRIGINE 150 MG/1
150 TABLET ORAL
Qty: 30 TABLET | Refills: 0 | Status: ON HOLD | OUTPATIENT
Start: 2023-07-28

## 2023-07-28 RX ORDER — DABIGATRAN ETEXILATE 150 MG/1
150 CAPSULE ORAL 2 TIMES DAILY
Qty: 60 CAPSULE | Refills: 0 | Status: SHIPPED | OUTPATIENT
Start: 2023-07-28 | End: 2023-07-28

## 2023-07-28 RX ORDER — BUPROPION HYDROCHLORIDE 100 MG/1
100 TABLET ORAL 2 TIMES DAILY
Qty: 60 TABLET | Refills: 0 | Status: ON HOLD | OUTPATIENT
Start: 2023-07-28

## 2023-07-28 RX ORDER — PANTOPRAZOLE SODIUM 40 MG/1
40 TABLET, DELAYED RELEASE ORAL
Qty: 30 TABLET | Refills: 0 | Status: ON HOLD | OUTPATIENT
Start: 2023-07-28

## 2023-07-28 RX ORDER — LIDOCAINE 50 MG/G
2 PATCH TOPICAL
Qty: 30 PATCH | Refills: 0 | Status: ON HOLD | OUTPATIENT
Start: 2023-07-28

## 2023-07-28 RX ORDER — DULOXETIN HYDROCHLORIDE 60 MG/1
60 CAPSULE, DELAYED RELEASE ORAL 2 TIMES DAILY
Qty: 60 CAPSULE | Refills: 0 | Status: ON HOLD | OUTPATIENT
Start: 2023-07-28

## 2023-07-28 RX ORDER — SENNOSIDES 8.6 MG
8.6 TABLET ORAL 2 TIMES DAILY
Qty: 60 TABLET | Refills: 0 | Status: ON HOLD | OUTPATIENT
Start: 2023-07-28

## 2023-07-28 RX ORDER — METOPROLOL SUCCINATE 25 MG/1
25 TABLET, EXTENDED RELEASE ORAL DAILY
Qty: 30 TABLET | Refills: 0 | Status: ON HOLD | OUTPATIENT
Start: 2023-07-28 | End: 2025-01-18

## 2023-07-28 RX ORDER — HYDROXYCHLOROQUINE SULFATE 200 MG/1
200 TABLET, FILM COATED ORAL 2 TIMES DAILY WITH MEALS
Qty: 60 TABLET | Refills: 0 | Status: ON HOLD | OUTPATIENT
Start: 2023-07-28 | End: 2024-01-24

## 2023-07-28 RX ORDER — DOCUSATE SODIUM 100 MG/1
100 CAPSULE, LIQUID FILLED ORAL 2 TIMES DAILY
Qty: 60 CAPSULE | Refills: 0 | Status: ON HOLD | OUTPATIENT
Start: 2023-07-28

## 2023-07-28 RX ORDER — GABAPENTIN 300 MG/1
300 CAPSULE ORAL 2 TIMES DAILY
Qty: 60 CAPSULE | Refills: 0 | Status: ON HOLD | OUTPATIENT
Start: 2023-07-28

## 2023-07-28 RX ORDER — LANOLIN ALCOHOL/MO/W.PET/CERES
3 CREAM (GRAM) TOPICAL
Qty: 30 TABLET | Refills: 0 | Status: ON HOLD | OUTPATIENT
Start: 2023-07-28

## 2023-07-28 RX ADMIN — ACETAMINOPHEN 650 MG: 325 TABLET, FILM COATED ORAL at 18:24

## 2023-07-28 RX ADMIN — ACETAMINOPHEN 650 MG: 325 TABLET, FILM COATED ORAL at 05:42

## 2023-07-28 RX ADMIN — MORPHINE SULFATE 7.5 MG: 15 TABLET ORAL at 04:35

## 2023-07-28 RX ADMIN — ONDANSETRON 4 MG: 4 TABLET, ORALLY DISINTEGRATING ORAL at 10:06

## 2023-07-28 RX ADMIN — APIXABAN 5 MG: 5 TABLET, FILM COATED ORAL at 18:25

## 2023-07-28 RX ADMIN — CINACALCET 30 MG: 30 TABLET, FILM COATED ORAL at 09:38

## 2023-07-28 RX ADMIN — HYDROXYCHLOROQUINE SULFATE 200 MG: 200 TABLET ORAL at 17:09

## 2023-07-28 RX ADMIN — DULOXETINE HYDROCHLORIDE 60 MG: 60 CAPSULE, DELAYED RELEASE ORAL at 09:40

## 2023-07-28 RX ADMIN — APIXABAN 5 MG: 5 TABLET, FILM COATED ORAL at 09:40

## 2023-07-28 RX ADMIN — SIMETHICONE 80 MG: 80 TABLET, CHEWABLE ORAL at 21:47

## 2023-07-28 RX ADMIN — MORPHINE SULFATE 7.5 MG: 15 TABLET ORAL at 14:05

## 2023-07-28 RX ADMIN — SIMETHICONE 80 MG: 80 TABLET, CHEWABLE ORAL at 12:59

## 2023-07-28 RX ADMIN — MORPHINE SULFATE 7.5 MG: 15 TABLET ORAL at 20:43

## 2023-07-28 RX ADMIN — LIDOCAINE 5% 2 PATCH: 700 PATCH TOPICAL at 21:57

## 2023-07-28 RX ADMIN — GABAPENTIN 300 MG: 300 CAPSULE ORAL at 18:26

## 2023-07-28 RX ADMIN — SIMETHICONE 80 MG: 80 TABLET, CHEWABLE ORAL at 09:39

## 2023-07-28 RX ADMIN — Medication 5 SPRAY: at 09:44

## 2023-07-28 RX ADMIN — Medication 5 SPRAY: at 21:42

## 2023-07-28 RX ADMIN — BUPROPION HYDROCHLORIDE 100 MG: 100 TABLET, FILM COATED ORAL at 09:43

## 2023-07-28 RX ADMIN — PANTOPRAZOLE SODIUM 40 MG: 40 TABLET, DELAYED RELEASE ORAL at 05:42

## 2023-07-28 RX ADMIN — LAMOTRIGINE 150 MG: 100 TABLET ORAL at 21:47

## 2023-07-28 RX ADMIN — GABAPENTIN 300 MG: 300 CAPSULE ORAL at 09:40

## 2023-07-28 RX ADMIN — DICLOFENAC SODIUM 2 G: 10 GEL TOPICAL at 09:44

## 2023-07-28 RX ADMIN — LEVOFLOXACIN 250 MG: 250 TABLET, FILM COATED ORAL at 12:59

## 2023-07-28 RX ADMIN — HYDROXYCHLOROQUINE SULFATE 200 MG: 200 TABLET ORAL at 09:39

## 2023-07-28 RX ADMIN — DULOXETINE HYDROCHLORIDE 60 MG: 60 CAPSULE, DELAYED RELEASE ORAL at 18:26

## 2023-07-28 RX ADMIN — Medication 5 SPRAY: at 18:28

## 2023-07-28 RX ADMIN — DICLOFENAC SODIUM 2 G: 10 GEL TOPICAL at 18:28

## 2023-07-28 RX ADMIN — MAGNESIUM OXIDE TAB 400 MG (241.3 MG ELEMENTAL MG) 400 MG: 400 (241.3 MG) TAB at 18:26

## 2023-07-28 RX ADMIN — SIMETHICONE 80 MG: 80 TABLET, CHEWABLE ORAL at 17:31

## 2023-07-28 RX ADMIN — DICLOFENAC SODIUM 2 G: 10 GEL TOPICAL at 21:43

## 2023-07-28 RX ADMIN — ONDANSETRON 4 MG: 4 TABLET, ORALLY DISINTEGRATING ORAL at 17:32

## 2023-07-28 RX ADMIN — ACETAMINOPHEN 650 MG: 325 TABLET, FILM COATED ORAL at 12:59

## 2023-07-28 RX ADMIN — ALBUTEROL SULFATE 2 PUFF: 90 AEROSOL, METERED RESPIRATORY (INHALATION) at 07:43

## 2023-07-28 RX ADMIN — DOCUSATE SODIUM 100 MG: 100 CAPSULE ORAL at 18:25

## 2023-07-28 RX ADMIN — BUPROPION HYDROCHLORIDE 100 MG: 100 TABLET, FILM COATED ORAL at 18:28

## 2023-07-28 RX ADMIN — METOPROLOL SUCCINATE 25 MG: 25 TABLET, FILM COATED, EXTENDED RELEASE ORAL at 21:47

## 2023-07-28 RX ADMIN — MAGNESIUM OXIDE TAB 400 MG (241.3 MG ELEMENTAL MG) 400 MG: 400 (241.3 MG) TAB at 09:40

## 2023-07-28 RX ADMIN — MELATONIN TAB 3 MG 3 MG: 3 TAB at 20:43

## 2023-07-28 NOTE — PROGRESS NOTES
OT daily treatment note       07/28/23 0700   Pain Assessment   Pain Assessment Tool 0-10   Pain Score 6   Pain Location/Orientation Orientation: Left; Location: Leg   Hospital Pain Intervention(s) Repositioned   Restrictions/Precautions   Precautions 1:1;Aspiration;Bed/chair alarms;Cognitive; Fall Risk;Impulsive;Contact/isolation;Pain;Supervision on toilet/commode;Visual deficit   Lifestyle   Autonomy "I may not go home tomorrow. My family isnt ready"   Oral Hygiene   Comment requested to complete s/p breakfast with SLP   Shower/Bathe Self   Type of Assistance Needed Physical assistance   Physical Assistance Level 25% or less   Comment SB completed requiring assistance for distal LE only to maintain spinal precautions.  CG in stance provided during posterior bathing   Shower/Bathe Self CARE Score 3   Bathing   Assessed Bath Style Sponge Bath   Tub/Shower Transfer   Reason Not Assessed Sponge Bath   Upper Body Dressing   Type of Assistance Needed Physical assistance   Physical Assistance Level 51%-75%   Comment assist to thread LLE for shirt, TA for TLSO brace   Upper Body Dressing CARE Score 2   Lower Body Dressing   Type of Assistance Needed Physical assistance   Physical Assistance Level 25% or less   Comment pt able to thread BLE through underwear/pant legs with use of reacher requiring inc time 2* coordination deficits; assist to thoroughly don over hip posteriorly   Lower Body Dressing CARE Score 3   Putting On/Taking Off Footwear   Type of Assistance Needed Physical assistance   Physical Assistance Level 51%-75%   Comment pt able to use reacher to doff socks; TA required to don   Putting On/Taking Off Footwear CARE Score 2   Lying to Sitting on Side of Bed   Type of Assistance Needed Physical assistance   Physical Assistance Level 25% or less   Comment LE mgmt   Lying to Sitting on Side of Bed CARE Score 3   Sit to Stand   Type of Assistance Needed Incidental touching   Physical Assistance Level No physical assistance   Comment RW   Sit to Stand CARE Score 4   Bed-Chair Transfer   Type of Assistance Needed Incidental touching   Physical Assistance Level No physical assistance   Comment CG SPT with RW   Chair/Bed-to-Chair Transfer CARE Score 4   Cognition   Overall Cognitive Status Impaired   Arousal/Participation Alert; Cooperative   Attention Attends with cues to redirect   Orientation Level Oriented X4   Memory Decreased short term memory;Decreased recall of precautions   Following Commands Follows one step commands with increased time or repetition   Activity Tolerance   Activity Tolerance Patient tolerated treatment well   Assessment   Treatment Assessment Pt participated in skilled OT session focusing on ADL performance in prep for DC tomorrow. Pt has made fair  progress during time of stay at the [facility]. Pt is overall functioning at Partial/moderate assistance  for ADLs, TOUCHING/ STEADYING assistance  for functional transfers and TOUCHING/ STEADYING assistance  for functional mobility. Prior to [address] [place] [address], DME recommendations include bedside commode, shower chair and RW. DME was ordered to maximize functional independence and decrease fall risk. Based on pts functional performance, pt is safe to [address] [place] [address] w/ recommendations noted above. Pt would benefit from continued OT services in home health to maximize functional abilities. Prognosis Fair   Problem List Decreased strength;Decreased endurance; Impaired balance;Decreased mobility; Decreased safety awareness;Pain;Orthopedic restrictions   Barriers to Discharge Inaccessible home environment;Decreased caregiver support   Plan   Treatment/Interventions ADL retraining;Functional transfer training; Endurance training; Therapeutic exercise;Patient/family training; Compensatory technique education   Progress Progressing toward goals   Recommendation   OT Discharge Recommendation Home with home health rehabilitation   OT Therapy Minutes   OT Time In 0700   OT Time Out 0800   OT Total Time (minutes) 60   OT Mode of treatment - Individual (minutes) 60   OT Mode of treatment - Concurrent (minutes) 0   OT Mode of treatment - Group (minutes) 0   OT Mode of treatment - Co-treat (minutes) 0   OT Mode of Treatment - Total time(minutes) 60 minutes   OT Cumulative Minutes 1658   Therapy Time missed   Time missed?  No

## 2023-07-28 NOTE — TELEPHONE ENCOUNTER
Daughter Tabitha Nolasco called the nurse line regarding her mother. Mrs Maria C Short is currently in  Acute Rehab after hospitalization from a fall. She is asking what you recommend as a lower cost anticoagulant. She is currently taking Eliquis. She said her mother is supposed to be discharged tomorrow and will need a script as Eliquis samples will be running out and she can't afford the cost of the med. She said the neurologist discussed Pradaxa. Warfarin is not an option    She also mentioned that patient has a huge hematoma on her left thigh since the fall. She would like to discuss the anticoagulant therapy.

## 2023-07-28 NOTE — PROGRESS NOTES
07/28/23 1000   Pain Assessment   Pain Assessment Tool 0-10   Pain Score 6   Pain Location/Orientation Location: Back   Restrictions/Precautions   Precautions 1:1;Aspiration;Bed/chair alarms;Cognitive; Fall Risk;Impulsive;Contact/isolation;Pain;Supervision on toilet/commode;Visual deficit   Weight Bearing Restrictions No   ROM Restrictions Yes  (spinal precautions)   Braces or Orthoses TLSO   Cognition   Overall Cognitive Status Impaired   Arousal/Participation Alert; Cooperative   Attention Attends with cues to redirect   Orientation Level Oriented X4   Memory Decreased short term memory;Decreased recall of precautions   Following Commands Follows one step commands with increased time or repetition   Roll Left and Right   Type of Assistance Needed Physical assistance   Physical Assistance Level 25% or less   Roll Left and Right CARE Score 3   Sit to Lying   Type of Assistance Needed Physical assistance   Physical Assistance Level 25% or less   Comment Fei   Sit to Lying CARE Score 3   Lying to Sitting on Side of Bed   Type of Assistance Needed Physical assistance   Physical Assistance Level 25% or less   Comment Fei   Lying to Sitting on Side of Bed CARE Score 3   Sit to Stand   Type of Assistance Needed Supervision   Comment CS with RW   Sit to Stand CARE Score 4   Bed-Chair Transfer   Type of Assistance Needed Incidental touching;Supervision   Comment varies between CGA/Supervision with RW   Chair/Bed-to-Chair Transfer CARE Score 4   Walk 10 Feet   Type of Assistance Needed Supervision   Physical Assistance Level No physical assistance   Comment CS with RW   Walk 10 Feet CARE Score 4   Walk 50 Feet with Two Turns   Type of Assistance Needed Incidental touching   Comment CS/CGA with RW   Walk 50 Feet with Two Turns CARE Score 4   Walk 150 Feet   Type of Assistance Needed Incidental touching   Comment CGA with RW   Walk 150 Feet CARE Score 4   Ambulation   Primary Mode of Locomotion Prior to Admission Walk Distance Walked (feet) 140 ft  (178)   Assist Device Roller Walker   Gait Pattern Slow Amy; Forward Flexion;Narrow AYLA   Limitations Noted In Endurance;Posture; Safety   Provided Assistance with: Balance   Walk Assist Level Close Supervision;Contact Guard   Does the patient walk? 2. Yes   Curb or Single Stair   Style negotiated Curb   Type of Assistance Needed Physical assistance   Physical Assistance Level 25% or less   Comment 6 inch curb with RW Fei   1 Step (Curb) CARE Score 3   4 Steps   Reason if not Attempted Activity not applicable   4 Steps CARE Score 9   12 Steps   Reason if not Attempted Activity not applicable   12 Steps CARE Score 9   Assessment   Treatment Assessment Patient participated in brief 30 minute skilled physical therapy session focusing on bed mobility, transfers, and ambulation in preparation for upcoming discharge on 7/29. Patient will require Close supervision to contact guard with transfer and ambulation. [facility] for daugther on 7/27 providing update on patient's functional status and required level of assistance. Provided number to call back if any questions. No call back as of today. Barriers to discharge home have been resovled. Problem List Decreased strength;Decreased endurance; Impaired balance;Decreased mobility; Decreased safety awareness;Pain;Orthopedic restrictions   Plan   Treatment/Interventions Functional transfer training;LE strengthening/ROM; Elevations; Therapeutic exercise; Endurance training;Patient/family training;Equipment eval/education;Gait training;Bed mobility   Progress Progressing toward goals   Recommendation   PT Discharge Recommendation Home with home health rehabilitation   Equipment Recommended Walker   PT Therapy Minutes   PT Time In 1000   PT Time Out 1030   PT Total Time (minutes) 30   PT Mode of treatment - Individual (minutes) 30   PT Mode of treatment - Concurrent (minutes) 0   PT Mode of treatment - Group (minutes) 0   PT Mode of treatment - Co-treat (minutes) 0   PT Mode of Treatment - Total time(minutes) 30 minutes   PT Cumulative Minutes 1323   Therapy Time missed   Time missed?  No     Reguengos de Sonam, PT, DPT

## 2023-07-28 NOTE — TELEPHONE ENCOUNTER
Schedule patient with Verle Loida for HFU on 9/19/23 at 2pm . A staff message from Dr John Aiken receive to schedule patient with neurovascular for stroke. [facility] and [facility] for patient daughter to schedule botox injection with Dr John Aiken first available.

## 2023-07-28 NOTE — PROGRESS NOTES
Physical Medicine and Rehabilitation Progress Note  Ainsley Lopez 70 y.o. female MRN: [id number]  Unit/Bed#: -01 Encounter: [id number]    To Review: Ainsley Lopez is a 70 y.o. female with medical history of HTN, CVA in 2/2023 on eliquis (chronic dysphagia, L sided weakness, cognitive impairment), hypercalcemia,  CHF, GERD, SHAR, gout, bipolar disorder, RA on plaquenil, chronic opioid dependence on morphine for chronic back/joint pain, who presented to the [address] [place] [address] on 6/30 after a fall, unable to get up and disoriented. She thinks she tripped and fell down her porch steps, landing on he L thigh. Found to have a L lateral thigh hematoma with evidence of extravasation on imaging. Binder placed over hips, and eliquis was held but not reversed given her recent stroke. Speech consulted and noted oropharyngeal swallow likely at baseline and recommended Level 1/NTL. Recommended for VBS. Course c/b ABLA on 7/2 Hgb dropped to 6.8 and she was given 1 unit pRBC. Geriatrics was consulted, noted at baseline she is oriented but forgetful, with worsening issues since her stroke. Mini-Cog was 9/15 in 2021. They recommended checking B12. They noted she has not been on lorazepam since January, and also recommended weaning down off gabapentin if possible. On 7/4 patient had an unwitnessed fall while trying to reach for her bag on the window sill. No head strike, but landed on her L hip. GCS 15, Neuro exam was baseline. CTA ordered to look for active bleeding, which showed decreased size of hematoma with no active extravasation. Hgb did drop after her transfusion, but seems to have stablized today at 8.5. That being said, her HR was elevated today, and it appears a troponin has been ordered by the trauma team as well as a CXR. CXR showed hazy RLL opacity, likely 2/2 atelectasis, and trops trended down. Metoprolol was increased with improvement. She was admitted to the [facility] on 7/7. Chief Complaint: Last night reported that her insurance wouldn't cover eliquis. Interval History/Subjective:  No acute events overnight. Last BM 7/27. Doing well with 1:1. Reports that her eliquis would cost her $500 to  yesterday. I confirmed this with pharmacy this morning - not sure how she was getting her eliquis before as she has been in/out of [facility]. The pharmacy confirmed that she is a candidate for a one month free coupon. She denies any new CP, SOB, fevers, chills, N/V, abdominal pain. ROS:  A 10 point review of systems was negative except for what is noted in the HPI. Today's Changes:  1. IM increased Toprol XL to 25mg daily. 2. I reached out to Pharmacy,  - she can get 1 month of eliquis free. That will be the plan for discharge. I then reached out to Cardiology Clinical team to see if they can arrange close f/u outpatient to possibly begin transition to warfarin.   - Price check for Xarelto was $545, and Eliquis was $564. Pradaxa is $203 for brand, $175 for generic   - She also probably needs Loop/Ziopatch, as she was started on Eliquis by Neuro for strong suspicion for cardioembolic origin of her previous strokes. 3. I will reach out to daughter today to review discharge planning for patient. - But plan will be to start with 1 month of eliquis free, and then transition to either Pradaxa (depending on cost) or warfarin. 4. Reviewed remainder of dispo planning with daughter. Plan is to send scripts to [facility] for meds to bed   - She reports Dr. Clovis Rodney wanted a repeat Echo which was due in June. Will get that done here before she leaves since transport will be difficult for her at discharge. 5. Last day of antibiotics today. Total time spent:  50 minutes, with more than 50% spent counseling/coordinating care.  Counseling includes discussion with patient re: progress in therapies, functional issues observed by therapy staff, and discussion with patient his/her current medical state/wellbeing. Coordination of patient's care was performed in conjunction with Internal Medicine service to monitor patient's labs, vitals, and management of their comorbidities. Assessment/Plan:    * Leg hematoma, left, initial encounter  Assessment & Plan  2/2 fall    6/30 CT CAP:  Large hematoma in the lateral left thigh measuring up to 9.6 cm with focus of active contrast extravasation. No acute intrathoracic or intra-abdominal injury. 7/4 CTA Pelvis:  1. Decreasing size of left lateral thigh hematoma without evidence for active extravasation. 2.  No CT findings in the visualized pelvis and thighs to explain falling hematocrit. 3.  Pancolitis with increasing inflammatory free fluid in the pelvis. No free air, pneumatosis, or encapsulated collections demonstrated. Non-operatively managed. Did require transfusion x1  Pain management as below  Monitor Hgb. PT/OT 3-5 hours/day, 5-7 days/week  Outpatient f/u with General Surgery      Cervical dystonia  Assessment & Plan  Recommend outpatient f/u with Dr. Miguel Delgado to evaluate for botulinum toxin injections  Appears to have a retrocollis. Sundowning  Assessment & Plan  Does wake up confused at night, but is easily redirectable  - More confusion, than agitation  - Wandered all the way down to the cafeteria on the 1st floor on 7/9  - No injuries  - Brought back to her room  - Found to have a UTI on admission and treated with Macrobid. - Initially was able to wean down from Virtual 1:1 to Frequent Checks. - Unfortunately on 7/19 had another fall with some confusion   - Found to have another UTI and started on enterococcus   - Now on 1:1 indefinitely. - Floor mats. - Delirium precautions    Night terrors  Assessment & Plan  Home: Prazosin  Here: none - held due to BP  Continue melatonin - so far tolerating.   Outpatient f/u with PCP    Hypomagnesemia  Assessment & Plan  Home: Mag oxide 800mg BID  Here: Mag oxide 400mg BID  7/28 Mag 1.9  Goals > 2. Sinus tachycardia  Assessment & Plan  Home: Toprol XL 25mg BID  Here: Toprol XL 25mg daily  On 7/6 with HR in 115-120. EKG showed sinus tach. Trops trended and negative. - Associated with transient/mild SOB   - She was only being given Toprol daily in the hospital   - Increased to BID with improvement. Had to be decreased eventually as was not meeting parameters. Lytes with K > 4, Mag > 2  Monitor and adjust regimen as appropriate  IM consulted to assist with management  Outpatient f/u with Dr. Arun Gonzalez    Dysphagia  Assessment & Plan  2/2 previous CVA earlier this year. Noted to have impaired oropharyngeal swallow  Was on free water protocol at home - held initially due to thrush - which has been treated and is resolved. On Level 1/NTL  - Inpatient SLP recommended VBS  - Consulting SLP here - they did not recommend VBS at this time. - Aspiration precautions  - Strict oral hygiene prior to trials  - esophagogram scheduled for 7/19, unable to complete d/t Aspiration risk    Urinary tract infection without hematuria  Assessment & Plan  Present on admission  With incontinence and confusion  Grew 100k CFU Enterococcus    - Started on Macrobid based on antibiogram on 7/9   - sensitivity confirms susceptibility   - improved symptoms of UTI  Unfortunately confusion and repeat UTI symptoms on 7/20  - 7/20 reports burning with urination, awaiting UA w/reflex, WBC 10-20   - 7/21 - urine culture + > 100K Enterococcus faecalis, started Levaquin  - Would treat for 7 days.  - Urology consulted - no further recs. May be a candidate for prophylactic antibiotics  - Does follow with Urogynecology at [facility] - last seen in 2020.  - Will need re-referral at discharge. History of stroke  Assessment & Plan  MRI brain 2/22 - 1.  New small acute /more recent interval lacunar infarction in the right cerebral peduncle (series 4, image 14).   2.  Extensive multifocal large, previously present right middle cerebral artery infarction which is evolving with superimposed areas of evolving hemorrhage likely related to hemorrhagic transformation of subacute right MCA infarction.  No significant   mass effect. 3.  Mild, chronic microangiopathy. S/P thrombectomy 2/12 by Dr Teofilo Izquierdo   Secondary stroke prophylaxis at this time with Eliquis 5 mg twice daily    - Per previous notes, strong suspicion for cardioembolic origin   - Has not had Ziopatch/Loop recorder placed   - Will need f/u with Cardiology   - Unfortunately, Eliquis price check was > $500, Xarelto was > $500, Pradaxa  Was $203 for brand and $175 for generic.    - Plan will be to go home with Eliquis for 1 month free, and then after that month, family can decide if they want to do Pradaxa, or go with Warfarin. PT, OT, SLP. Review modifiable risk factors and provide stroke education  Monitor for poststroke pain  Eliquis for secondary stroke prophylaxis    Outpatient f/u with Neurology    Obstructive sleep apnea  Assessment & Plan  Previously non-compliant with hospital or home CPAP  Outpatient f/u with PCP. Hypercalcemia  Assessment & Plan  Suspect 2/2 primary hyperparathyroidism  7/8 iCal 1.12, Phos 3.3  Home: Sensipar 30mg daily  Here: Same  Recommend outpatient f/u with Endocrinology      HTN (hypertension)  Assessment & Plan  Tends to run on the low/normal side. Home: Toprol XL 25mg daily  Here: Toprol XL 12.5mg daily at bedtime. Has not been able to get meds at times due to not meeting parameters.    Monitor and adjust as appropriate  - Spironolactone held 7/15 and d/c 7/16  IM consulted to assist with management    Bipolar depression [facility])  Assessment & Plan  Home: Bupropion 100mg BID, Cymbalta 60mg BID, Ativan 0.25mg BID PRN (however this has not been filled since January 2023 as per PDMP)  Here: Bupropion 100mg BID, Cymbalta 60mg BID  Consulting rehab psychology for support  Adjust as appropriate  Outpatient f/u with PCP    Severe protein-calorie malnutrition [facility])  Assessment & Plan  Malnutrition Findings:                                 BMI Findings: Body mass index is 20.89 kg/m². Consulted nutrition    Acute pain of right shoulder  Assessment & Plan  Has arthritis on imaging  Dur like mild impingement   - Considerations in therapy reviewed with OT  May benefit from injection to shoulder  intra-articular, but possibly subacromial if no improvement with therapies). Recommend outpatient f/u with Orthopedics. She would like a new referral to Orthopedics at discharge. Osteoporosis  Assessment & Plan  Hx L1 compression fx s/p kyphoplasty  Has multiple compression fractures noted on Thoracic spine imaging   - And possibly new anterior wedging of T12 on most recent imaging. Was previously on Prolia - last given in 2020   - Held due to dental issues  Recommend outpatient f/u with Endocrinology  2/23 Vitamin D level is 41.7    Anemia  Assessment & Plan  2/2 L leg hematoma (most recent imaging negative for extravasation)  At home on eliquis  No reversal inpatient due to recent CVA  Prior to admission Hgb 10-12. Ravi 7/2 6.8 - given 1 unit pRBC  7/27 Hgb 8.5 > 9.2 > 9.3 > 10.1 > 9.9 > 10.9 > 9.6 > 9.5 > 9.2  IM consulted to assist with management  Recommend outpatient f/u with PCP to monitor Hgb in 1 week. CHF (congestive heart failure) (HCC)  Assessment & Plan  Wt Readings from Last 3 Encounters:   07/28/23 49 kg (108 lb 0.4 oz)   06/30/23 55.3 kg (122 lb)   06/06/23 47.9 kg (105 lb 9.6 oz)     2/2023 Echo 30% with G2DD, moderate AI  Home: Toprol XL 25mg daily, Aldactone 25mg daily   - Limited by BP - no ACE/ARB   - no SGLT2i due to recurrent UTI  Here: Toprol XL 25mg daily at bedtime. Unclear etiology of cardiomyopathy  Possible consideration for ischemic work-up outpatient, poor candidate for aggressive procedures   Monitor I/O/daily weights, volume status, lytes.   IM consulted to assist with management  Outpatient f/u with Dr. Marty Hernandez   - At her last visit, they wanted repeat echo in 2 months (due June 2023). Ordered prior to discharge to be performed while here since transportation/mobility is difficult. Mixed stress and urge urinary incontinence  Assessment & Plan  Frankly incontinent in the hospital  History of pubovaginal sling with LVPG  Initiate timed voids and scans   - PVRs have all been low   - Timed voids help significantly with continence. Has history of recurrent UTIs - see that entry for details  Close continence care  Recommend outpatient f/u with Urogynecology. Referral provided at discharge      Gastroesophageal reflux disease without esophagitis  Assessment & Plan  Home: PPI daily  Here: PPI daily    Seronegative arthropathy of multiple sites [facility])  Assessment & Plan  Follows with Dr. Yovany Tobar with Rheum and is on Hydroxychloroquine 200mg BID. Continue home regimen and follow-up with rheumatology on discharge    Mild intermittent asthma without complication  Assessment & Plan  No acute exacerbation  Home: Albuterol PRN  Here: Same    Chronic pain syndrome  Assessment & Plan  Joint and back pain  Follows with Dr. Cheryle Hugger with [facility]  Reviewed PDMP - at home on MSIR 7.5mg TID PRN. Here: MSIR 7.5mg TID PRN - currently only using twice daily  - Outpatient f/u with Dr. Cheryle Hugger. - Reached out to his office. PDMP reviewed - last filled 6/30 prior to admission   - If she runs out sooner, family can call and they will refill   - They have follow-up arranged for 8/29, and put her on the wait list as well to be seen sooner if possible. Thrush-resolved as of 7/24/2023  Assessment & Plan  Noted on admission.  Now resolved  7/7 started on nystatin swish/spit  - 7/11 improving  - Continued for at least 7 days with 2-3 days after her antibiotics stop    Fall-resolved as of 7/28/2023  Assessment & Plan  - s/p slip fall onto buttocks 7/19  - did not ask for assistance  - Now with bed alarms and 1:1 after failing frequent checks.  - CTH, CT C-spine without acute findings, XR Hip/Pelvis without acute findings  - 2 days later with back pain and found to have possible new wedging of T12 - age indeterminate without fracture line  - Pain control as detailed below. - neurosurgery recommend- thoracic spine precautions,TLSO brace, repeat TL XR with follow-up in 2 weeks        Health Maintenance  #Delirium/Sleep: At high risk. See cognitive impairment above. #Pain: Tylenol scheduled 650mg Q6hr, Gabapentin 300mg BID (will try to wean down here), Cymbalta 60mg BID, MSIR 7.5mg TID PRN, Lidoderm, Voltaren. #Bowel: Last BM 7/7 PRN bisacodyl suppository/miralax. Senna/docusate. #Bladder: See incontinence above  #Skin/Pressure Injury Prevention: Turn Q2hr in bed, with weight shifts [id number] in wheelchair. Float heels in bed. #DVT Prophylaxis: Fully anticoagulated on eliquis 5mg BID, SCDs. #GI Prophylaxis: Protonix already for chronic issues. #Code Status: Full Code  #FEN: Level 1/NTL, Ensure HP Pudding- [place] at Breakfast/Lunch/Dinner  #Dispo: Team 7/12: ADD to be determined now with new compression fracture and pain and currently treating recurrent UTI.  She has had a decline in function given her pain and new spinal precautions  #f/u: Endocrine, Rheum, Cards, Trauma, PMR, Dr. Henry Macias for pain management, Urogynecology, Neurology, Neurosurgery, Orthopedics    Objective:    Functional Update:   PT: CGA transfers, CGA bed mobility, CGA ambulation 72' with RW, Marika stairs   OT: Sup eating, Sup grooming, modA bathing, maxA Ub dressing with TLSO, maxA LB dressing, Marika toileting, Marika toilet transfers  SLP: Level 1/NTL    Allergies per EMR    Physical Exam:  Temp:  [97.7 °F (36.5 °C)-98 °F (36.7 °C)] 97.8 °F (36.6 °C)  HR:  [] 100  Resp:  [12-18] 18  BP: ()/(56-74) 90/56  Oxygen Therapy  SpO2: 95 %  O2 Flow Rate (L/min): 2 L/min    Gen: No acute distress, frail  HEENT: Moist mucus membranes, Normocephalic/Atraumatic  Cardiovascular: Regular rate, rhythm, S1/S2. Distal pulses palpable  Heme/Extr: No edema  Pulmonary: Non-labored breathing. Lungs CTAB  : No lopes  GI: Soft, non-tender, non-distended. BS+  MSK: Retrocollis. Generalized sarcopenia. Kyphotic spine. Generalized weakness. Integumentary: Skin is warm, dry  Neuro: AAOx3, Speech is intact and intelligible. Appropriate to questioning. Impaired recall. Psych: Normal mood and affect.      Diagnostic Studies: Reviewed, no new imaging    Laboratory:  Reviewed   Results from last 7 days   Lab Units 07/27/23  0709 07/24/23  0510 07/22/23  0900   HEMOGLOBIN g/dL 9.2* 9.5* 9.6*   HEMATOCRIT % 30.8* 29.7* 30.4*   WBC Thousand/uL 3.11* 4.28* 7.76     Results from last 7 days   Lab Units 07/27/23  0709 07/24/23  0510 07/22/23  0916   BUN mg/dL 15 13 16   POTASSIUM mmol/L 4.4 3.7 4.2   CHLORIDE mmol/L 109* 111* 110*   CREATININE mg/dL 0.84 0.76 0.92   AST U/L  --   --  21   ALT U/L  --   --  15            Patient Active Problem List   Diagnosis   • Chronic pain syndrome   • Mild intermittent asthma without complication   • Ambulatory dysfunction   • Seronegative arthropathy of multiple sites ([facility])   • Gastroesophageal reflux disease without esophagitis   • Mixed stress and urge urinary incontinence   • CHF (congestive heart failure) ([facility])   • Anemia   • Undifferentiated connective tissue disease ([address])   • Primary generalized (osteo)arthritis   • Peripheral neuropathy   • Osteoporosis   • Hx of compression fracture of spine   • Lumbar spondylosis   • Cervical spondylosis   • Closed fracture of multiple ribs of right side   • Traumatic pneumothorax   • Closed fracture of transverse process of lumbar vertebra [facility])   • Liver contusion   • Fall   • Acute pain due to trauma   • Acute pain of right shoulder   • Severe protein-calorie malnutrition (HCC)   • Bipolar depression ([facility])   • Rheumatoid arthritis of multiple sites with negative rheumatoid factor ([address])   • 'light-for-dates' infant with signs of fetal malnutrition • LAILA (acute kidney injury) ([address])   • Polypharmacy   • Cardiomyopathy ([address])   • Vitamin D insufficiency   • Stroke ([address])   • HTN (hypertension)   • HFrEF (heart failure with reduced ejection fraction) ([facility])   • Hypercalcemia   • Discoloration of skin of foot   • Abdominal pain   • Acute CVA (cerebrovascular accident) ([address])   • History of bacteremia   • Obstructive sleep apnea   • History of stroke   • Urinary tract infection without hematuria   • AMS (altered mental status)   • Gout of foot   • Dysphagia   • Wound of left ankle   • Hypokalemia   • Sinus tachycardia   • Hypomagnesemia   • Insomnia   • Nocturnal hypoxia   • Stroke-like symptoms   • Cerebral aneurysm, nonruptured   • Leg hematoma, left, initial encounter   • Night terrors   • Sundowning   • Cervical dystonia         Medications  Current Facility-Administered Medications   Medication Dose Route Frequency Provider Last Rate   • acetaminophen  650 mg Oral Q6H [facility] Nishi Rojas MD     • albuterol  2 puff Inhalation Q6H PRN Nishi Rojas MD     • apixaban  5 mg Oral BID Nishi Rojas MD     • buPROPion  100 mg Oral BID Nishi Rojas MD     • calcium carbonate  1,000 mg Oral TID PRN Nishi Rojas MD     • cinacalcet  30 mg Oral Daily With Breakfast Nishi Rojas MD     • Diclofenac Sodium  2 g Topical 4x Daily Nishi Rojas MD     • docusate sodium  100 mg Oral BID Margi Joseph MD     • DULoxetine  60 mg Oral BID Nishi Rojas MD     • gabapentin  300 mg Oral BID Margi Joseph MD     • hydroxychloroquine  200 mg Oral BID With Meals Nishi Rojas MD     • lamoTRIgine  150 mg Oral HS Nishi Rojas MD     • levofloxacin  250 mg Oral Q24H LAURA Nguyen     • lidocaine  2 patch Topical HS Nishi Rojas MD     • magnesium Oxide  400 mg Oral BID Nishi Rojas MD     • melatonin  3 mg Oral HS Nishi Rojas MD     • metoprolol succinate  12.5 mg Oral HS LAURA Ortiz     • morphine  7.5 mg Oral TID PRN Libia Kirkland MD     • ondansetron  4 mg Oral Q6H PRN Whitney Kaplan MD     • pantoprazole  40 mg Oral Early Morning Whitney Kaplan MD     • polyethylene glycol  17 g Oral Daily PRN LAURA Rutledge     • polyethylene glycol  17 g Oral Once LAURA Seaman     • saliva substitute  5 spray Mouth/Throat 4x Daily LAURA Heaton     • senna  1 tablet Oral BID Libia Kirkland MD     • simethicone  80 mg Oral 4x Daily (with meals and at bedtime) LAURA Heaton            ** Please Note: Fluency Direct voice to text software may have been used in the creation of this document.  **

## 2023-07-28 NOTE — TELEPHONE ENCOUNTER
7/28/23-  [address] F/U SCHEDULED 8/10/23  PT WILL NEED THORACOLUMBAR XRAYS    Huy Huerta MD  [address],     Patient has a presumed new T12 compression fracture - she was seen in the hospital by Nsx - non-operatively managed. They would like to follow-up with her in 1-2 weeks. She is being discharged on 7/29, and her daughter can be called to arrange follow-up. Thanks in advance,   Marilou Wheatley MD   Physical Medicine and Rehabilitation       PER [facility] 7/22 NOTE"From NSx perspective no procedure is anticipated, continue conservative treatment with bracing, PT/OT and pain control. Follow-up in 2 weeks with upright thoracolumbar spine x-rays in brace. S/O.   Call with question or concern."  *NEVER RECEIVED STAFF MESSAGE FOR F/U AND PT WAS NOT ON EMAIL OR DC LIST*

## 2023-07-28 NOTE — DISCHARGE SUMMARY
Discharge Summary - PMR   Dav Santoyo 70 y.o. female MRN: [id number]  Unit/Bed#: -01 Encounter: [id number]    Admission Date: 7/7/2023     Discharge Date: 7/29/2023    Etiologic/Rehabilitation Diagnosis: Impairment of mobility, safety and Activities of Daily Living (ADLs) due to Other Disabling Impairments:  13  Other Disabling Impairments    HPI: Dav Santoyo is a 70 y.o. female with medical history of HTN, CVA in 2/2023 on eliquis (chronic dysphagia, L sided weakness, cognitive impairment), hypercalcemia,  CHF, GERD, SHAR, gout, bipolar disorder, RA on plaquenil, chronic opioid dependence on morphine for chronic back/joint pain, who presented to the [address] [place] [address] on 6/30 after a fall, unable to get up and disoriented. She thinks she tripped and fell down her porch steps, landing on he L thigh. Found to have a L lateral thigh hematoma with evidence of extravasation on imaging. Binder placed over hips, and eliquis was held but not reversed given her recent stroke. Speech consulted and noted oropharyngeal swallow likely at baseline and recommended Level 1/NTL. Recommended for VBS. Course c/b ABLA on 7/2 Hgb dropped to 6.8 and she was given 1 unit pRBC. Geriatrics was consulted, noted at baseline she is oriented but forgetful, with worsening issues since her stroke. Mini-Cog was 9/15 in 2021. They recommended checking B12. They noted she has not been on lorazepam since January, and also recommended weaning down off gabapentin if possible. On 7/4 patient had an unwitnessed fall while trying to reach for her bag on the window sill. No head strike, but landed on her L hip. GCS 15, Neuro exam was baseline. CTA ordered to look for active bleeding, which showed decreased size of hematoma with no active extravasation. Hgb did drop after her transfusion, but seems to have stablized today at 8.5.  That being said, her HR was elevated today, and it appears a troponin has been ordered by the trauma team as well as a CXR. CXR showed hazy RLL opacity, likely 2/2 atelectasis, and trops trended down. Metoprolol was increased with improvement. [facility]. Procedures Performed During [facility] Admission: None    Acute Rehabilitation Center Course: Patient participated in a comprehensive interdisciplinary inpatient rehabilitation program which included involvment of MD, therapies (PT, OT, and/or SLP), RN, CM, SW, dietary, and psychology services. She was able to be advanced to an overall min-mod level of assist and considered safe for discharge home with family. Please see below for patient's day to day management of rehabilitation needs. Please refer to Internal Medicine notes during [facility] stay for day to day management of patient's medical co-morbidities. * Leg hematoma, left, initial encounter  Assessment & Plan  2/2 fall    6/30 CT CAP:  Large hematoma in the lateral left thigh measuring up to 9.6 cm with focus of active contrast extravasation. No acute intrathoracic or intra-abdominal injury. 7/4 CTA Pelvis:  1. Decreasing size of left lateral thigh hematoma without evidence for active extravasation. 2.  No CT findings in the visualized pelvis and thighs to explain falling hematocrit. 3.  Pancolitis with increasing inflammatory free fluid in the pelvis. No free air, pneumatosis, or encapsulated collections demonstrated. Non-operatively managed. Did require transfusion x1  Pain management as below  Monitor Hgb. PT/OT 3-5 hours/day, 5-7 days/week  Outpatient f/u with General Surgery      Cervical dystonia  Assessment & Plan  Recommend outpatient f/u with Dr. Leonila Hoang to evaluate for botulinum toxin injections  Appears to have a retrocollis.       Sundowning  Assessment & Plan  Does wake up confused at night, but is easily redirectable  - More confusion, than agitation  - Wandered all the way down to the cafeteria on the 1st floor on 7/9  - No injuries  - Brought back to her room  - Found to have a UTI on admission and treated with Macrobid. - Initially was able to wean down from Virtual 1:1 to Frequent Checks. - Unfortunately on 7/19 had another fall with some confusion   - Found to have another UTI and started on enterococcus   - Now on 1:1 indefinitely. - Floor mats. - Delirium precautions    Night terrors  Assessment & Plan  Home: Prazosin  Here: none - held due to BP  Continue melatonin - so far tolerating. Outpatient f/u with PCP    Hypomagnesemia  Assessment & Plan  Home: Mag oxide 800mg BID  Here: Mag oxide 400mg BID  7/28 Mag 1.9  Goals > 2. Sinus tachycardia  Assessment & Plan  Home: Toprol XL 25mg BID  Here: Toprol XL 25mg daily  On 7/6 with HR in 115-120. EKG showed sinus tach. Trops trended and negative. - Associated with transient/mild SOB   - She was only being given Toprol daily in the hospital   - Increased to BID with improvement. Had to be decreased eventually as was not meeting parameters. Lytes with K > 4, Mag > 2  Monitor and adjust regimen as appropriate  IM consulted to assist with management  Outpatient f/u with Dr. Anthony Conner    Dysphagia  Assessment & Plan  2/2 previous CVA earlier this year. Noted to have impaired oropharyngeal swallow  Was on free water protocol at home - held initially due to thrush - which has been treated and is resolved. On Level 1/NTL  - Inpatient SLP recommended VBS  - Consulting SLP here - they did not recommend VBS at this time.   - Aspiration precautions  - Strict oral hygiene prior to trials  - esophagogram scheduled for 7/19, unable to complete d/t Aspiration risk    Urinary tract infection without hematuria  Assessment & Plan  Present on admission  With incontinence and confusion  Grew 100k CFU Enterococcus    - Started on Macrobid based on antibiogram on 7/9   - sensitivity confirms susceptibility   - improved symptoms of UTI  Unfortunately confusion and repeat UTI symptoms on 7/20  - 7/20 reports burning with urination, awaiting UA w/reflex, WBC 10-20   - 7/21 - urine culture + > 100K Enterococcus faecalis, started Levaquin  - Would treat for 7 days.  - Urology consulted - no further recs. May be a candidate for prophylactic antibiotics  - Does follow with Urogynecology at [facility] - last seen in 2020.  - Will need re-referral at discharge. History of stroke  Assessment & Plan  MRI brain 2/22 - 1.  New small acute /more recent interval lacunar infarction in the right cerebral peduncle (series 4, image 14). 2.  Extensive multifocal large, previously present right middle cerebral artery infarction which is evolving with superimposed areas of evolving hemorrhage likely related to hemorrhagic transformation of subacute right MCA infarction.  No significant   mass effect. 3.  Mild, chronic microangiopathy. S/P thrombectomy 2/12 by Dr Chau Hamilton   Secondary stroke prophylaxis at this time with Eliquis 5 mg twice daily    - Per previous notes, strong suspicion for cardioembolic origin   - Has not had Ziopatch/Loop recorder placed   - Will need f/u with Cardiology   - Unfortunately, Eliquis price check was > $500, Xarelto was > $500, Pradaxa  Was $203 for brand and $175 for generic.    - Plan will be to go home with Eliquis for 1 month free, and then after that month, family can decide if they want to do Pradaxa, or go with Warfarin. PT, OT, SLP. Review modifiable risk factors and provide stroke education  Monitor for poststroke pain  Eliquis for secondary stroke prophylaxis    Outpatient f/u with Neurology    Obstructive sleep apnea  Assessment & Plan  Previously non-compliant with hospital or home CPAP  Outpatient f/u with PCP. Hypercalcemia  Assessment & Plan  Suspect 2/2 primary hyperparathyroidism  7/8 iCal 1.12, Phos 3.3  Home: Sensipar 30mg daily  Here: Same  Recommend outpatient f/u with Endocrinology      HTN (hypertension)  Assessment & Plan  Tends to run on the low/normal side.   Home: Toprol XL 25mg daily  Here: Toprol XL 12.5mg daily at bedtime. Has not been able to get meds at times due to not meeting parameters. Monitor and adjust as appropriate  - Spironolactone held 7/15 and d/c 7/16  IM consulted to assist with management    Bipolar depression [facility])  Assessment & Plan  Home: Bupropion 100mg BID, Cymbalta 60mg BID, Ativan 0.25mg BID PRN (however this has not been filled since January 2023 as per PDMP)  Here: Bupropion 100mg BID, Cymbalta 60mg BID  Consulting rehab psychology for support  Adjust as appropriate  Outpatient f/u with PCP    Severe protein-calorie malnutrition ([address])  Assessment & Plan  Malnutrition Findings:                                 BMI Findings: Body mass index is 20.89 kg/m². Consulted nutrition    Acute pain of right shoulder  Assessment & Plan  Has arthritis on imaging  Dur like mild impingement   - Considerations in therapy reviewed with OT  May benefit from injection to shoulder  intra-articular, but possibly subacromial if no improvement with therapies). Recommend outpatient f/u with Orthopedics. She would like a new referral to Orthopedics at discharge. Osteoporosis  Assessment & Plan  Hx L1 compression fx s/p kyphoplasty  Has multiple compression fractures noted on Thoracic spine imaging   - And possibly new anterior wedging of T12 on most recent imaging. Was previously on Prolia - last given in 2020   - Held due to dental issues  Recommend outpatient f/u with Endocrinology  2/23 Vitamin D level is 41.7    Anemia  Assessment & Plan  2/2 L leg hematoma (most recent imaging negative for extravasation)  At home on eliquis  No reversal inpatient due to recent CVA  Prior to admission Hgb 10-12. Ravi 7/2 6.8 - given 1 unit pRBC  7/27 Hgb 8.5 > 9.2 > 9.3 > 10.1 > 9.9 > 10.9 > 9.6 > 9.5 > 9.2  IM consulted to assist with management  Recommend outpatient f/u with PCP to monitor Hgb in 1 week.      CHF (congestive heart failure) ([address])  Assessment & Plan  Wt Readings from Last 3 Encounters:   07/28/23 49 kg (108 lb 0.4 oz)   06/30/23 55.3 kg (122 lb)   06/06/23 47.9 kg (105 lb 9.6 oz)     2/2023 Echo 30% with G2DD, moderate AI  Home: Toprol XL 25mg daily, Aldactone 25mg daily   - Limited by BP - no ACE/ARB   - no SGLT2i due to recurrent UTI  Here: Toprol XL 25mg daily at bedtime. Unclear etiology of cardiomyopathy  Possible consideration for ischemic work-up outpatient, poor candidate for aggressive procedures   Monitor I/O/daily weights, volume status, lytes. IM consulted to assist with management  Outpatient f/u with Dr. Ismael Cardenas   - At her last visit, they wanted repeat echo in 2 months (due June 2023). Ordered prior to discharge to be performed while here since transportation/mobility is difficult. Mixed stress and urge urinary incontinence  Assessment & Plan  Frankly incontinent in the hospital  History of pubovaginal sling with [facility]  Initiate timed voids and scans   - PVRs have all been low   - Timed voids help significantly with continence. Has history of recurrent UTIs - see that entry for details  Close continence care  Recommend outpatient f/u with Urogynecology. Referral provided at discharge      Gastroesophageal reflux disease without esophagitis  Assessment & Plan  Home: PPI daily  Here: PPI daily    Seronegative arthropathy of multiple sites [facility])  Assessment & Plan  Follows with Dr. Ramsey Hayden with Rheum and is on Hydroxychloroquine 200mg BID. Continue home regimen and follow-up with rheumatology on discharge    Mild intermittent asthma without complication  Assessment & Plan  No acute exacerbation  Home: Albuterol PRN  Here: Same    Chronic pain syndrome  Assessment & Plan  Joint and back pain  Follows with Dr. Tiffanie Cherry with [facility]  Reviewed PDMP - at home on MSIR 7.5mg TID PRN. Here: MSIR 7.5mg TID PRN - currently only using twice daily  - Outpatient f/u with Dr. Tiffanie Cherry. - Reached out to his office.  PDMP reviewed - last filled 6/30 prior to admission   - If she runs out sooner, family can call and they will refill   - They have follow-up arranged for 8/29, and put her on the wait list as well to be seen sooner if possible. Thrush-resolved as of 7/24/2023  Assessment & Plan  Noted on admission. Now resolved  7/7 started on nystatin swish/spit  - 7/11 improving  - Continued for at least 7 days with 2-3 days after her antibiotics stop    Fall-resolved as of 7/28/2023  Assessment & Plan  - s/p slip fall onto buttocks 7/19  - did not ask for assistance  - Now with bed alarms and 1:1 after failing frequent checks.  - CTH, CT C-spine without acute findings, XR Hip/Pelvis without acute findings  - 2 days later with back pain and found to have possible new wedging of T12 - age indeterminate without fracture line  - Pain control as detailed below. - neurosurgery recommend- thoracic spine precautions,TLSO brace, repeat TL XR with follow-up in 2 weeks        Significant Findings, Care, Treatment and Services Provided: Acute comprehensive interdisciplinary inpatient rehabilitation including PT, OT, SLP, RN, CM, SW, dietary, psychology, etc.    Complications: Sundowning - see above for more details. T12 compression fracture see above.     Functional Status Upon Admission to [facility]:  Mobility: Marika bed mobility, Marika ambulation with RW 15' x2   Transfers: Marika  ADLs: Sup grooming, Marika UB bathing, modA LB bathing, modA UB dressing, maxA LB dressing.      Functional Status Upon Discharge from [facility]:   PT: Marika bed mobility, CGA transfers, CGA-Sup for [id number] with RW Sup-CGA with curb/stair  OT: Marika shower/bathe, modA Ub dressing, modA LB dressing, maxA footwear, CGA toileting, CGA toilet transfers    Discharge Diagnosis: Impairment of mobility, safety and Activities of Daily Living (ADLs) due to Other Disabling Impairments:  13  Other Disabling Impairments    Discharge Medications:   See after visit summary for reconciled discharge medications provided to patient and family. Condition at Discharge: fair     Discharge instructions/Information to patient and family:   See after visit summary for information provided to patient and family. Provisions for Follow-Up Care:  See after visit summary for information related to follow-up care and any pertinent home health orders. Future Appointments   Date Time Provider [address]   7/31/2023 To Be Determined Luanne Crow PT [facility]   8/10/2023  9:30 AM LAURA Comer [facility]   8/16/2023 11:20 AM Cecelia Hernandez MD [facility]   9/19/2023  2:00 PM Rolando Friend PA-C [facility]   9/20/2023  2:40 PM Jelena Flowers PA-C [facility] None   9/21/2023  3:15 PM Lisa Geronimo MD [facility]   5/16/2024  2:00 PM BE MRI 1 BE MRI [facility]   5/22/2024  1:30 PM Airam Clarke MD [facility]       Disposition: Home with family support and Home PT/OT/SLP/RN. Planned Readmission: No    Discharge Statement   I spent 45 minutes discharging the patient. This time was spent on the day of discharge. I had direct contact with the patient on the day of discharge. Greater than 50% of the total time was spent examining patient, answering all patient questions, arranging and discussing plan of care with patient as well as directly providing post-discharge instructions. Additional time then spent on discharge activities. Discharge Medications:  See after visit summary for reconciled discharge medications provided to patient and family.       Facility Administered Medications Prior to Discharge:    Current Facility-Administered Medications   Medication Dose Route Frequency Provider Last Rate   • acetaminophen  650 mg Oral Q6H [address] Lisa Geronimo MD     • albuterol  2 puff Inhalation Q6H PRN Lisa Geronimo MD     • apixaban  5 mg Oral BID Lisa Geronimo MD     • buPROPion  100 mg Oral BID Lisa Geronimo MD     • calcium carbonate 1,000 mg Oral TID PRN Javier Tiwari MD     • cinacalcet  30 mg Oral Daily With Breakfast Javier Tiwari MD     • Diclofenac Sodium  2 g Topical 4x Daily Javier Tiwari MD     • docusate sodium  100 mg Oral BID Berry Colby MD     • DULoxetine  60 mg Oral BID Javier Tiwari MD     • gabapentin  300 mg Oral BID Berry Colby MD     • hydroxychloroquine  200 mg Oral BID With Meals Javier Tiwari MD     • lamoTRIgine  150 mg Oral HS Javier Tiwari MD     • levofloxacin  250 mg Oral Q24H LAURA Monzon     • lidocaine  2 patch Topical HS Javier Tiwari MD     • magnesium Oxide  400 mg Oral BID Javier Tiwari MD     • melatonin  3 mg Oral HS Javier Tiwari MD     • metoprolol succinate  12.5 mg Oral HS LAURA Monzon     • morphine  7.5 mg Oral TID PRN Berry Colby MD     • ondansetron  4 mg Oral Q6H PRN Javier Tiwari MD     • pantoprazole  40 mg Oral Early Morning Javier Tiwari MD     • polyethylene glycol  17 g Oral Daily PRN LAURA Rutledge     • polyethylene glycol  17 g Oral Once LAURA Loredo     • saliva substitute  5 spray Mouth/Throat 4x Daily LAURA Monzon     • senna  1 tablet Oral BID Berry Colby MD     • simethicone  80 mg Oral 4x Daily (with meals and at bedtime) Deana Varghese, [address] [place] [address]

## 2023-07-28 NOTE — PROGRESS NOTES
07/28/23 9330   Pain Assessment   Pain Assessment Tool 0-10   Pain Score 6   Pain Location/Orientation Location: Back   Restrictions/Precautions   Precautions 1:1;Aspiration;Bed/chair alarms;Cognitive; Fall Risk;Impulsive;Contact/isolation;Pain;Supervision on toilet/commode;Visual deficit   Weight Bearing Restrictions No   ROM Restrictions Yes  (spinal precautions)   Braces or Orthoses TLSO   Cognition   Overall Cognitive Status Impaired   Arousal/Participation Alert; Cooperative   Attention Attends with cues to redirect   Orientation Level Oriented X4   Memory Decreased short term memory;Decreased recall of precautions   Following Commands Follows one step commands with increased time or repetition   Subjective   Subjective Patient reports she is ready to go home   Sit to Lying   Type of Assistance Needed Physical assistance   Physical Assistance Level 25% or less   Comment Marika for management of LEs   Sit to Lying CARE Score 3   Lying to Sitting on Side of Bed   Type of Assistance Needed Physical assistance   Physical Assistance Level 25% or less   Comment Marika for management of LEs   Lying to Sitting on Side of Bed CARE Score 3   Sit to Stand   Type of Assistance Needed Supervision; Incidental touching   Comment varied between CS and CGA this session   Sit to Stand CARE Score 4   Bed-Chair Transfer   Type of Assistance Needed Incidental touching   Comment CS/CGA with RW   Chair/Bed-to-Chair Transfer CARE Score 4   Car Transfer   Type of Assistance Needed Physical assistance   Physical Assistance Level 25% or less   Comment Marika for management of LEs   Car Transfer CARE Score 3   Walk 10 Feet   Type of Assistance Needed Supervision   Comment CS with RW   Walk 10 Feet CARE Score 4   Walk 50 Feet with Two Turns   Type of Assistance Needed Incidental touching   Comment CS/CGA with RW   Walk 50 Feet with Two Turns CARE Score 4   Walk 150 Feet   Type of Assistance Needed Incidental touching   Comment CS/CGA with RW Walk 150 Feet CARE Score 4   Walking 10 Feet on Uneven Surfaces   Type of Assistance Needed Incidental touching   Comment CGA with RW   Walking 10 Feet on Uneven Surfaces CARE Score 4   Ambulation   Primary Mode of Locomotion Prior to Admission Walk   Distance Walked (feet) 150 ft  (140)   Assist Device Roller Walker   Gait Pattern Slow Amy; Forward Flexion;Narrow AYLA   Limitations Noted In Endurance;Posture; Safety   Provided Assistance with: Balance   Walk Assist Level Close Supervision;Contact Guard   Does the patient walk? 2. Yes   Wheel 50 Feet with Two Turns   Reason if not Attempted Activity not applicable   Wheel 50 Feet with Two Turns CARE Score 9   Wheel 150 Feet   Reason if not Attempted Activity not applicable   Wheel 372 Feet CARE Score 9   Curb or Single Stair   Style negotiated Curb   Type of Assistance Needed Physical assistance   Physical Assistance Level 25% or less   Comment 6 inch curb with RW, Fei for management of RW   1 Step (Curb) CARE Score 3   4 Steps   Reason if not Attempted Activity not applicable   4 Steps CARE Score 9   12 Steps   Reason if not Attempted Activity not applicable   12 Steps CARE Score 9   Picking Up Object   Type of Assistance Needed Supervision   Comment CS with reacher   Picking Up Object CARE Score 4   Therapeutic Interventions   Other 5X STS with use of bilateral UEs 15 seconds; TUG with RW: 36 seconds   Assessment   Treatment Assessment Patient participated in 40 minute skilled physical therapy session focusing on functional mobility and outcome measures. Patient performed TUG with RW in 36 seconds vs 41 seconds on initial evaluation indicating some improvement with mobility although pt remains high fall risk. Patient to discharge home with assistance of family and [address] [place] [address],[address] PT services. Patient will require Fei for bed mobility and CS/CGA for transfers and ambulation. Problem List Decreased strength;Decreased endurance; Impaired balance;Decreased mobility; Decreased safety awareness;Pain;Orthopedic restrictions   Recommendation   PT Discharge Recommendation Home with home health rehabilitation   Equipment Recommended Walker   PT Therapy Minutes   PT Time In 1450   PT Time Out 1530   PT Total Time (minutes) 40   PT Mode of treatment - Individual (minutes) 40   PT Mode of treatment - Concurrent (minutes) 0   PT Mode of treatment - Group (minutes) 0   PT Mode of treatment - Co-treat (minutes) 0   PT Mode of Treatment - Total time(minutes) 40 minutes   PT Cumulative Minutes Ryder, PT, DPT

## 2023-07-28 NOTE — TELEPHONE ENCOUNTER
Eve Lanier MD  [facility] Neurology [facility],     Patient will need follow-up with Neurovascular - she had a stroke in February and has been in and out of the hospital since then. She is being discharged from rehab tomorrow (7/29). Her daughter should be called to make the appointment. I would also like to see this patient in my clinic - as I want to evaluate her for cervical dystonia. It would be an established visit since I took care of here. I think I can make it to perform botulinum toxin injections. Could we schedule this for 4-6 weeks from now to make sure we have time to order the Botox?      Thanks so much,   Guillermo Nice MD   Physical Medicine and Rehabilitation

## 2023-07-28 NOTE — PROGRESS NOTES
Internal Medicine Progress Note  Patient: Carol Poag  Age/sex: 70 y.o. female  Medical Record #: [id number]      ASSESSMENT/PLAN: (Interval History)  Carol Machuca is seen and examined and management for following issues:    Left lateral thigh hematoma   • Stable     ABLA  • S/p 1 unit PRBCs 7/2/23  • CBC 7/18/23 stable     Fall  • While getting OOB 7/19  • Did not hit head but unwitnessed  • CTH/CT cerv spine with no acute changes     Fracture T12  • Age indeterminate  • NS saw 7/22 = no procedure/surgery needed; continue TLSO brace; follow-up in 2 weeks with upright thoracolumbar spine x-rays in brace     Right shoulder pain  • Verdie Alvarez 7/23 was w/o acute      Chronic combined systolic/diastolic heart failure   • Originally diagnosed 2004 when LVEF then was 10-15% and felt to be NICM  • Her LVEF did recover into 50's then fell when she had her CVA in 2/2023 to 30%  • Also has moderate AI/gr 2 DD  • Sees Dr Tanisha Elena a [facility] in 2/2023 when LVEF was found to be 30% which is most current  • Had been on Coreg in past but HF team switched her to Toprol when she had her stroke 2/2023  • Her soft BPs have limited starting her on ACEI/ARB as OP  • Home:  Toprol XL 25mg BID/Aldactone 25mg qd  • Here:  Toprol XL 12.5 mg qhs  • D/C'd Aldactone 7/16  • Euvolemic  • Toprol [email or web address] doses so switched to 12.5 mg qhs 7/25; held 7/25, given last night.   Has some intermitt mild tachycardia (is regular apically)  • She has gotten Toprol the last 2 nights so will try to go up to 25mg qhs since having some mild intermitt tachycardia  • For followup ECHO today (overdue from previous)     Hypercalcemia  • Continue Cinacalcet here as was taking at home  • Calcium 8.3 on 7/24/23     HS hypoxia   • using 4L NC HS at home  • using O2 at night here     Nightmares  • placed on Minipress when here with us after her CVA  • D/w Dr Lucas Chauhan, d/c'd since had been too low to get  • Minipress dc'd 2/2 hypotension     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  • Was felt at the time by Neurology to likely be cardioembolic with moderate to high suspicion of PAF given the M1 occlusion and left atrial enlargement (see Dr Dooley's consult note from 2/12/2023)  • Has residual left sided weakness  • Had recrudescence of CVA 4/2023 with worsening left facial droop  • Continue Eliquis     Dysphagia  • Had admission for aspiration PNA at [facility] 5/2023 tx'd with Levaquin and Flagyl  • Continue pureed diet with NTL as at home  • ST following     Weight loss  • Continue Ensure HP pudding TID and Magic cups TID  • She was on no supplements at home so likely will gain some wt that she is eating/drinking supps here  • adamantly refuses PEG tube  • Nutrition following     Bipolar depression  • Home:  Wellbutrin 100mg BID/Cymbalta 60mg BID/Lamictal 150mg qhs  • Here:  Wellbutrin 100mg BID/Cymbalta 60mg BID/Lamictal 150mg qhs     Rheumatoid arthritis  • Continue Plaquenil 200mg BID as at home     Chronic pain/chronic opioid use  • 2/2 RA and lumbar spondylosis  • Pain management per PMR     Hx incidental right superior hypophyseal artery aneurysm  • Follows with NS Dr Maninder Gar     [facility] urine 3/2023  • Got Cipro then to [facility] for tx at the time  • Maintain contact isolation     Dye allergy   • Was dye prepped on 7/4/23 with Benadryl 50mg IV x 1 w/o issues     Incidental findings  • Pancolitis with increasing inflammatory free fluid in pelvis  • Calcified granuloma RLL     Abdominal pain/nausea  · Not new  · Occurs intermittently  · Was in ER for such sx 6/2023 and CT abd/pelvis, POC AAA US normal, POC biliary US was nondiagnostic (unable to see GB) --> sent home with Zofran.   She had been seen same day at [facility] for same sx    Asthma  · Uses prn Albuterol here/home when she feels "tight"  · Used today with resolution of sx    Aortic aneurysm  · Found at [facility]  · To see CTS as OP     Enterococcus UTI  • S/p Macrobid per  Depadua = sensitive  • UA for c/o dysuria again 7/20 = culture again with >100,000 Enterococcus  • Pt has multiple abx allergies. • On Levaquin 250 mg q24hrs which is susceptible. Tx'ing for 7 days with LD 7/28  • Uro saw 7/24/23 = may consider suppressive tx but see her Uro as OP     Discharge date:  7/29/23       The above assessment and plan was reviewed and updated as determined by my evaluation of the patient on 7/28/2023.     Labs:   Results from last 7 days   Lab Units 07/27/23  0709 07/24/23  0510   WBC Thousand/uL 3.11* 4.28*   HEMOGLOBIN g/dL 9.2* 9.5*   HEMATOCRIT % 30.8* 29.7*   PLATELETS Thousands/uL 160 134*     Results from last 7 days   Lab Units 07/27/23  0709 07/24/23  0510   SODIUM mmol/L 141 144   POTASSIUM mmol/L 4.4 3.7   CHLORIDE mmol/L 109* 111*   CO2 mmol/L 31 28   BUN mg/dL 15 13   CREATININE mg/dL 0.84 0.76   CALCIUM mg/dL 8.1* 8.3                   Review of Scheduled Meds:  Current Facility-Administered Medications   Medication Dose Route Frequency Provider Last Rate   • acetaminophen  650 mg Oral Q6H [address] Edith Gerardo MD     • albuterol  2 puff Inhalation Q6H PRN Edith Gerardo MD     • apixaban  5 mg Oral BID Edith Gerardo MD     • buPROPion  100 mg Oral BID Edith Gerardo MD     • calcium carbonate  1,000 mg Oral TID PRN Edith Gerardo MD     • cinacalcet  30 mg Oral Daily With Breakfast Edith Gerardo MD     • Diclofenac Sodium  2 g Topical 4x Daily Edith Gerardo MD     • docusate sodium  100 mg Oral BID Heaven Yip MD     • DULoxetine  60 mg Oral BID Edith Gerardo MD     • gabapentin  300 mg Oral BID Heaven Yip MD     • hydroxychloroquine  200 mg Oral BID With Meals Edith Gerardo MD     • lamoTRIgine  150 mg Oral HS Edith Gerardo MD     • levofloxacin  250 mg Oral Q24H LAURA Jones     • lidocaine  2 patch Topical HS Edith Gerardo MD     • magnesium Oxide  400 mg Oral BID Edith Gerardo MD     • melatonin  3 mg Oral HS Oscar Patel Lucas Chauhan MD     • metoprolol succinate  12.5 mg Oral HS LAURA Vuong     • morphine  7.5 mg Oral TID PRN Keegan Dietz MD     • ondansetron  4 mg Oral Q6H PRN Nancy Villegas MD     • pantoprazole  40 mg Oral Early Morning Nancy Villegas MD     • polyethylene glycol  17 g Oral Daily PRN LAURA Rutledge     • polyethylene glycol  17 g Oral Once LAURA Simon     • saliva substitute  5 spray Mouth/Throat 4x Daily LAURA Vuong     • senna  1 tablet Oral BID Keegan Dietz MD     • simethicone  80 mg Oral 4x Daily (with meals and at bedtime) LAURA Vuong         Subjective/ HPI: Patient seen and examined. Patients overnight issues or events were reviewed with nursing or staff during rounds or morning huddle session. New or overnight issues include the following:     No new or overnight issues. Offers no complaints    ROS:   A 10 point ROS was performed; negative except as noted above. Imaging:     XR shoulder 2+ vw right   Final Result by Brigid Elizalde MD (07/26 9200)      No acute osseous abnormality. Workstation performed: [id number]         XR spine thoracic 3 vw   Final Result by Flavia Catherine MD (07/21 1706)      1. Mild anterior wedging of T12, not present on the recent prior CT. A fracture line is not visualized and this is age-indeterminate. 2.  Otherwise stable chronic compression fractures at T9, T11, and L1 with kyphoplasty at L1.      3.  Kyphosis at the thoracolumbar junction. The study was marked in [facility] for immediate notification. Workstation performed: [id number]         XR spine lumbar 2 or 3 views injury   Final Result by Flavia Catherine MD (07/21 1708)      No acute fracture. Stable chronic compression deformities at L1 and L4. Workstation performed: [id number]         CT head wo contrast   Final Result by Stepan Schumacher MD (07/19 2025)      No interval change.  No acute intracranial hemorrhage or depressed calvarial fracture identified. Chronic right MCA distribution infarct. Workstation performed: [id number]         CT spine cervical wo contrast   Final Result by Cecille Lipscomb MD (07/19 2029)      No acute fracture or evidence for traumatic malalignment. No interval change since prior exam.                  Workstation performed: [id number]         XR hip/pelv 2-3 vws left if performed   Final Result by Jael Vasquez MD (07/19 1856)      No acute osseous abnormality. Workstation performed: [id number]         XR spine thoracolumbar 2 vw    (Results Pending)       *Labs /Radiology studies reviewed  *Medications reviewed and reconciled as needed  *Please refer to order section for additional ordered labs studies  *Case discussed with primary attending during morning huddle case rounds    Physical Examination:  Vitals:   Vitals:    07/27/23 1900 07/27/23 2141 07/28/23 0547 07/28/23 0636   BP: 98/60 120/70 90/56    BP Location: Left arm  Right arm    Pulse: 103 (!) 112 100    Resp: 12  18    Temp: 97.7 °F (36.5 °C)   97.8 °F (36.6 °C)   TempSrc: Axillary   Axillary   SpO2: 93%  95%    Weight:   49 kg (108 lb 0.4 oz)    Height:           General Appearance: no distress, conversive  HEENT:  External ear normal.  Nose normal w/o drainage. Mucous membranes are moist. Oropharynx is clear. Conjunctiva clear w/o icterus or redness. Lungs: BBS without crackles/wheeze/rhonchi; respirations unlabored with normal inspiratory/expiratory effort. No retractions noted. On RA  CV: regular rate and rhythm; no rubs/murmurs/gallops, PMI normal   ABD: Abdomen is soft. Bowel sounds all quadrants. Nontender with no distention. EXT: no edema  Skin: normal turgor, normal texture, no rashes  Psych: affect normal, mood normal  Neuro: AAO     The above physical exam was reviewed and updated as determined by my evaluation of the patient on 7/28/2023.     Invasive Devices     None VTE Pharmacologic Prophylaxis: Eliquis  Code Status: Level 1 - Full Code  Current Length of Stay: 21 day(s)      Total time spent:  30 minutes with more than 50% spent counseling/coordinating care. Counseling includes discussion with patient re: progress  and discussion with patient of his/her current medical state/information. Coordination of patient's care was performed in conjunction with primary service. Time invested included review of patient's labs, vitals, and management of their comorbidities with continued monitoring. In addition, this patient was discussed with medical team including physician and advanced extenders. The care of the patient was extensively discussed and appropriate treatment plan was formulated unique for this patient. Medical decision making for the day was made by supervising physician unless otherwise noted in their attestation statement. ** Please Note:  voice to text software may have been used in the creation of this document.  Although proof errors in transcription or interpretation are a potential of such software**

## 2023-07-28 NOTE — PROGRESS NOTES
07/28/23 0800   Pain Assessment   Pain Assessment Tool 0-10   Pain Score No Pain   Restrictions/Precautions   Precautions 1:1;Aspiration;Bed/chair alarms;Cognitive; Fall Risk;Impulsive;Contact/isolation;Pain;Supervision on toilet/commode;Visual deficit   Weight Bearing Restrictions No   ROM Restrictions Yes  (spinal precautions)   Braces or Orthoses TLSO   Eating   Type of Assistance Needed Supervision;Verbal cues; Set-up / clean-up   Physical Assistance Level No physical assistance   Eating CARE Score 4   Swallow Assessment   Swallow Treatment Assessment Daily Dysphagia Tx Note      Patient Name: Kanwal Barrios     [id number] Date: 7/28/2023        Current Risks for Dysphagia & Aspiration: Weak voicing; weak cough; dysarthria; general debilitation; new neuro event; brain injury; cognitive deficit; hx neurologic dx; positioning issues     Current Symptoms/Concerns: difficulty chewing; decreased oral intake; hx of dysphagia/aspiration; throat clearing; cough with intake     Current diet:puree/level 1 diet and nectar thick liquids      Premorbid diet::puree/level 1 diet and nectar thick liquids      Positioning: upright in recliner     Items administered:Consistencies Administered: nectar thick and puree  Materials administered included: puree eggs, pudding, and NTL by cup      Total amount of meal consumed:   100% puree eggs  4 bites boost pudding  120cc of NTL by cup        Oral stage:mild-moderate  Lip closure: functional  Anterior spillage: none  Mastication/manipulation: mild-moderately delayed  Bolus formation: more coordinated today  Bolus control: decreased NTL and pudding  Transfer: mild-moderate delayed given both puree/NT  Oral residue: none  Pocketing: none           Pharyngeal stage:moderate  Swallow promptness: moderately delayed  Hyolaryngeal elevation: decreased   Wet voice: none   Throat clear: none  Cough: x2 with mucous during mid part of meal and x2 with pudding towards end of meal; also present post belch   Secondary swallows: encouraged to complete double and effortful  Audible swallows: present        Esophageal stage:  Present with reflux s/s towards mid and end of meal- coughing and bringing up secretions. Summary:     Pt presenting with mild-moderate oral and moderate pharyngeal dysphagia today. Symptoms or concerns included decreased bolus propulsion, decreased mastication, decreased bolus formation, delayed oral intiation and suspected decreased control of NTL   suspected pharyngeal swallow delay, suspected decreased hyolaryngeal elevation upon palpation, suspected pharyngeal residue, multiple swallows, effortful swallow, audible swallows and cough present mid to end of meal.       Pt provided set up with meal, no physical assistance self feeding. Pt needing reminders to keep items on tray as pt attempting to hold pudding cup in left hand and given weakness, drops it. Pt stated "I know it doesn't work but my mind keeps wanting to try it". Pt assessed with puree meal and NTL- good retrieval and control, no anterior loss. Oral processing of puree conts to be mild to moderately prolonged. Delayed transfers and swallows present. Pt completing and encouraged to complete 2-3 swallows per bolus- effortful cues as well. Pt using liquid wash throughout meal- sips of NTL by cup with good retrieval and control as well- transfers and swallows audible. Mid meal, pt with increased difficulty with coughing present- clearing secretions/mucous. Pt also with strong cough with pudding, spitting out entire bolus amount into napkin. Pt also with belching and coughing, suggesting bottom up component. Pt stated "since I wear this thing (TSLO) it makes my reflux worse". Reviewed education on smaller meals throughout the day given early satiation and delayed motility for emptying. Pt agreeable and stated "I'll do that at home".      Pt completed all education on diet and modifications- handouts and training with family provided. Pt is scheduled for discharge home tomorrow with 24 hr S/A from family. Cont home ST services at discharge. Recommendations:  Diet: puree/level 1 diet and nectar thick liquids  Meds: whole or cut with puree  Strategies: upright posture, only feed when fully alert, slow rate of feeding, small bites/sips, effortful swallow, quiet environment (tv off, limit talking, door closed, etc.) and alternating bites and sips     FULL supervision w/ meals. Results reviewed with: patient, Sudha Tovar RN  Aspiration precautions posted. F/u ST tx: Pt is currently recommended for further skilled SLP services targeting dysphagia therapy in order to maximize oral and pharyngeal swallow skills, while safely supporting PO intake, as well as to improve independent carryover of safe swallow strategies. Plan: Cont to monitor pt on current diet              Trial upgraded textures of dysphagia level 2 and thin liquids as appropriate (consider trialing IDDSI Level 1- slightly thick liquid)-->focus will be towards therapeutic means vs overall diet advancement              Will benefit from f/u with GI team which pt follows with [facility] given esophageal concerns. Cont education on puree diet and NTL with strategies for at home (education folder started in room with handouts)   Swallow Assessment Prognosis   Prognosis Fair   Prognosis Considerations Age; Co-morbidities; Medical prognosis; Medical diagnosis;Previous level of function;Severity of impairments;New learning ability;Ability to carry over; Cooperation   SLP Therapy Minutes   SLP Time In 0800   SLP Time Out 0845   SLP Total Time (minutes) 45   SLP Mode of treatment - Individual (minutes) 45   SLP Mode of treatment - Concurrent (minutes) 0   SLP Mode of treatment - Group (minutes) 0   SLP Mode of treatment - Co-treat (minutes) 0   SLP Mode of Treatment - Total time(minutes) 45 minutes   SLP Cumulative Minutes 665   Therapy Time missed   Time missed?  No

## 2023-07-28 NOTE — DISCHARGE INSTR - OTHER ORDERS
Margarita Elizalde, it was a pleasure to be apart of your treatment team during your stay here! Best wishes for continued health. Tresa Lucia PT    Tips to Remember:    (1) Spinal Precautions: No B. L. T- bending, lifting, or twisting   (2) Take your time when performing transfers, always make sure your left hand is on the correct part of the walker       Recommendations for discharge: Margarita Elizalde will require close supervision to contact guard (hands on patient) for transfers and walking to decrease fall risk. For step management, she will need assistance for placing walker on the step and contact guard (hand on patient) assistance as she completes the step up.

## 2023-07-29 VITALS
HEART RATE: 99 BPM | WEIGHT: 108.9 LBS | RESPIRATION RATE: 18 BRPM | HEIGHT: 63 IN | SYSTOLIC BLOOD PRESSURE: 104 MMHG | BODY MASS INDEX: 19.3 KG/M2 | TEMPERATURE: 97.4 F | OXYGEN SATURATION: 96 % | DIASTOLIC BLOOD PRESSURE: 64 MMHG

## 2023-07-29 PROCEDURE — 99239 HOSP IP/OBS DSCHRG MGMT >30: CPT | Performed by: PHYSICAL MEDICINE & REHABILITATION

## 2023-07-29 RX ADMIN — MAGNESIUM OXIDE TAB 400 MG (241.3 MG ELEMENTAL MG) 400 MG: 400 (241.3 MG) TAB at 08:01

## 2023-07-29 RX ADMIN — GABAPENTIN 300 MG: 300 CAPSULE ORAL at 08:01

## 2023-07-29 RX ADMIN — BUPROPION HYDROCHLORIDE 100 MG: 100 TABLET, FILM COATED ORAL at 08:02

## 2023-07-29 RX ADMIN — PANTOPRAZOLE SODIUM 40 MG: 40 TABLET, DELAYED RELEASE ORAL at 06:19

## 2023-07-29 RX ADMIN — HYDROXYCHLOROQUINE SULFATE 200 MG: 200 TABLET ORAL at 07:57

## 2023-07-29 RX ADMIN — ACETAMINOPHEN 650 MG: 325 TABLET, FILM COATED ORAL at 00:51

## 2023-07-29 RX ADMIN — CINACALCET 30 MG: 30 TABLET, FILM COATED ORAL at 07:57

## 2023-07-29 RX ADMIN — DULOXETINE HYDROCHLORIDE 60 MG: 60 CAPSULE, DELAYED RELEASE ORAL at 08:00

## 2023-07-29 RX ADMIN — Medication 5 SPRAY: at 08:11

## 2023-07-29 RX ADMIN — DOCUSATE SODIUM 100 MG: 100 CAPSULE ORAL at 08:02

## 2023-07-29 RX ADMIN — ACETAMINOPHEN 650 MG: 325 TABLET, FILM COATED ORAL at 06:19

## 2023-07-29 RX ADMIN — DICLOFENAC SODIUM 2 G: 10 GEL TOPICAL at 08:11

## 2023-07-29 RX ADMIN — APIXABAN 5 MG: 5 TABLET, FILM COATED ORAL at 08:01

## 2023-07-29 RX ADMIN — SIMETHICONE 80 MG: 80 TABLET, CHEWABLE ORAL at 07:57

## 2023-07-29 NOTE — NURSING NOTE
AVS reviewed with pt and daughter Krishna Pham. Paper orders for bloodwork and referral for UroGyn given to pt. Pt with occasional episodes of incontinence, falls were recorded while on ARC, no new skin breakdown noted and pt's pain is well-managed with current pain medication regimen. Pt left unit via WC with PCA and daughter Krishna Pham. Pt went to [facility] for prescription pickup. All belongings taken with pt.

## 2023-07-31 ENCOUNTER — TELEPHONE (OUTPATIENT)
Dept: NEUROLOGY | Facility: CLINIC | Age: 68
End: 2023-07-31

## 2023-07-31 ENCOUNTER — HOME CARE VISIT (OUTPATIENT)
Dept: HOME HEALTH SERVICES | Facility: HOME HEALTHCARE | Age: 68
End: 2023-07-31
Payer: MEDICARE

## 2023-07-31 NOTE — PROGRESS NOTES
07/31/23 1532   Hello, [Guardian’s Name / Patient’s Miguel Borges, this is [Caller Miguel Borges from [facility], and our clinical care team wanted to check on you / your child after your recent visit to the hospital. It will only take 3-5 minutes. Is this a good time? Discharge Call Type/ Specific Diagnosis: General Call   General Discharge Phone Call   Were your/your child's discharge instructions clear and understandable? Please tell me in your own words how to care for yourself/your child now that you're home Yes;Patient understood instructions   Have you filled your/your child's new prescriptions yet? Yes   What questions do you have about those medications? No questions   Are you/your child having any unusual symptoms or problems? (Specific to problem- i.e., dressing, pain, bruising or swelling, procedure, etc.) No reported symptoms/problems   Do you have follow up appointment with your/your child's physician? Yes   Is there anything preventing you from keeping that appointment? No;Patient able to keep appointment   Are there any physicians, nurses, or hospital staff you would like us to recognize for doing a very good job? Nurse;PCA/Tech;PT/OT/RT/SLP   Thank you for taking the time to share with me about your care and recovery. Do you have any suggestions for us? No   This call resulted in: No interventions needed   Call Complete   Attempted Number of Calls 1   Discharge phone call complete?  Complete

## 2023-07-31 NOTE — CASE MANAGEMENT
Team dc summary - pt made gains and returned home at her prior level of function. Family in for therapy prior to dc as pt is now with a tlso. Continued nursing and pt and ot services were to continue through [facility]. Pt had all necessary dme. pts daughter present for dc instructions and provided transport home.

## 2023-07-31 NOTE — SPEECH THERAPY NOTE
SLP Discharge Summary    PT was discharged home w/ family support/supervision on 7/29/23. Focus of ST sessions while on the acute rehab center focused on dysphagia skills and ongoing monitoring of swallow strategies to lessen overt risk of aspiration. Pt completed beside swallow assessment on initial eval. Pt known to this unit from previous admission. Pt with baseline dysphagia- diet at home puree and NTL. On initial assessment on the ARC, pt presened with moderate oral and pharyngeal dysphagia withsSymptoms or concerns included trace anterior loss of thins, slow manipulation of puree, prolonged mastication of level 2 trials, reduced bolus formation, suspected reduced bolus control, slow and at times labored transfers, suspected pharyngeal swallow delay, suspected reduced hyolaryngeal elevation, secondary swallows, audible swallows and presence of frequent throat clearing and coughing across meal. Recommendations are for cont'd puree and NTL at this time- frequent oral care given mild thrush which is improving. Plan to cont to assess at bedside and possibly complete repeat VFSS as last one (to this department knowledge) was completed on [facility] in March 2023. In regards to cognition, pt with baseline mild cognitive deficits with sundowning behaviors. Swallow function did improve and pt had demonstrated  mild to moderate oral and pharyngeal dysphagia. Symptoms or concerns included slow manipulation of puree, reduced bolus formation, suspected reduced bolus control, slow and at times labored transfers, suspected pharyngeal swallow delay, suspected reduced hyolaryngeal elevation, secondary swallows, audible swallows as well as GI s/s present. Discussed held with pt in regards to GI hx- pt recommended follow up with her outpt specialist through [facility], pt does not want to pursue work up during this hospital stay with [facility]. Education started in regards to swallow function and diet recommendations.  Folder started with educational handouts, diet education, where to purchase different thickeners as well as recipes for high protein puree options. Plan to cont to trial some softer solid options while on the unit but in completion of family training w/ pt and both pt's children, safest least restrictive diet is puree w/ NTL by cup only. Ongoing education and review was completed in addition to providing educational handouts and suggested ways to make puree foods and thicken liquids provided. Also reviewed positioning and safe swallow strategies. Pt would continue to benefit from home SLP services targeting dysphagia therapy in order to maximize oral and pharyngeal swallow skills, while safely supporting PO intake, as well as to improve independent carryover of safe swallow strategies upon transition back to home environment.

## 2023-07-31 NOTE — TELEPHONE ENCOUNTER
----- Message from Abbi Wilson MD sent at 7/28/2023  8:39 AM EDT -----  Regarding: Botox  Hello,    She will be a new start botox. I took care of her inpatient and will follow-up with her. Trying to get her in in about 4 weeks. Would be 100 units for cervical dystonia.     Thanks so much,  Amanda Mccallum MD  Physical Medicine and Rehabilitation

## 2023-08-01 NOTE — TELEPHONE ENCOUNTER
Patient has active Insurance Coverage's of:  Primary Ins: Medicare A&B  Secondary Ins: AARP-Supplement    New-Start:  Botox-100 Units. Qty:1, Q3 Months. [id number] Required for:  Procedure-Codes: [id number] & [id number]. DX: G24.3, Cervical Dystonia. Please use our Stock. ( There is no out of pocket cost for patient as long as they meet the Medicare required guidelines for the medical necessity to receive Botox Injections and patients secondary AARP Ins.  Plan picks up and covers the remaining 20% cost as it follows Medicare coverage guidelines.)

## 2023-08-01 NOTE — TELEPHONE ENCOUNTER
Called # on file for patient and reached patients daughter Severo Dess and explained the patients insurance coverage for their Botox and made her aware that as long as Botox is medically necessary then patients Medicare A&B and [employer]ARP-Supplement Insurance plans will  and cover any costs for the Botox Injections. Patients daughter Severo Dess understood and was satisfied with the phone call. Patients daughter Severo Dess also requested that her mother be added to the wait list for the possibility to be scheduled for a sooner BINJ-Appt as patient is currently not scheduled until 9/21/2023. Thank you. January 14, 2020     [address] [place] [id number]      Dear Renny Painter:    Below are the results from your recent visit:    TB test negative     Resulted Orders   TB INTRADERMAL TEST   Result Value Ref Range    Date Given: 1/9/

## 2023-08-01 NOTE — OCCUPATIONAL THERAPY NOTE
OT Discharge Summary    Pt has made fair  progress during time of stay at the [facility]. Pt is overall functioning at Partial/moderate assistance  for ADLs, TOUCHING/ STEADYING assistance  for functional transfers and TOUCHING/ STEADYING assistance  for functional mobility. Prior to [address] [place] [address], DME recommendations include bedside commode, shower chair and RW. DME was ordered to maximize functional independence and decrease fall risk. Based on pts functional performance, pt is safe to [address] [place] [address] w/ recommendations noted above. Pt would benefit from continued OT services in home health to maximize functional abilities.

## 2023-08-01 NOTE — TELEPHONE ENCOUNTER
08/01/2023-CALLED PT, SPOKE TO BONNIE (PT'S DAUGHTER), CONFIRMED 08/10/2023 APT IN [facility] AND TO HAVE XRAY COMPLETED PRIOR TO APT

## 2023-08-02 NOTE — PHYSICAL THERAPY NOTE
Percy Khoury  presented to the [facility] following hospitalization for fall at home resulting in left leg hematoma and significant decline in functional mobility. Physical therapy plan of care focused on bed mobility, transfer training, gait training, stair navigation, standing balance training , LE strengthening , activities to improve endurance/activity tolerance, pain management  and family training . Patient made fair  during their stay. Goals were partially met. Barriers to met goals include: thoracic fx following fall with new spinal precautions and TLSO brace, new acute back pain 2/2 to fracture limiting activity tolerance at times. On discharge, patient required Fei for bed mobility, CS for STS, CGA for SPT, CS/CGA with ambulation, Fei for car transfer, Fei for curb navigation. Patient discharge home with recommendations for 24/7 supervision, family support, and [facility] PT services.

## 2023-08-03 ENCOUNTER — HOSPITAL ENCOUNTER (EMERGENCY)
Facility: HOSPITAL | Age: 68
Discharge: HOME | End: 2023-08-03
Payer: MEDICARE

## 2023-08-03 ENCOUNTER — HOME CARE VISIT (OUTPATIENT)
Dept: HOME HEALTH SERVICES | Facility: HOME HEALTHCARE | Age: 68
End: 2023-08-03
Payer: MEDICARE

## 2023-08-03 ENCOUNTER — APPOINTMENT (EMERGENCY)
Dept: RADIOLOGY | Facility: HOSPITAL | Age: 68
End: 2023-08-03
Payer: MEDICARE

## 2023-08-03 VITALS
RESPIRATION RATE: 21 BRPM | OXYGEN SATURATION: 100 % | SYSTOLIC BLOOD PRESSURE: 112 MMHG | HEART RATE: 120 BPM | TEMPERATURE: 97.3 F | DIASTOLIC BLOOD PRESSURE: 62 MMHG
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
## 2023-08-03 LAB
ABO GROUP BLD: NORMAL
AMORPH URATE CRY URNS QL MICRO: ABNORMAL
ANION GAP SERPL CALCULATED.3IONS-SCNC: 8 MMOL/L
APTT PPP: 43 SECONDS (ref 23–37)
ATRIAL RATE: 112 BPM
BACTERIA UR QL AUTO: ABNORMAL /HPF
BASE EXCESS BLDA CALC-SCNC: 3 MMOL/L (ref -2–3)
BASOPHILS # BLD AUTO: 0.02 THOUSANDS/ÂΜL (ref 0–0.1)
BASOPHILS NFR BLD AUTO: 0 % (ref 0–1)
BILIRUB UR QL STRIP: NEGATIVE
BLD GP AB SCN SERPL QL: NEGATIVE
BUN SERPL-MCNC: 10 MG/DL (ref 5–25)
CA-I BLD-SCNC: 0.98 MMOL/L (ref 1.12–1.32)
CALCIUM SERPL-MCNC: 8.3 MG/DL (ref 8.3–10.1)
CHLORIDE SERPL-SCNC: 108 MMOL/L (ref 96–108)
CLARITY UR: ABNORMAL
CO2 SERPL-SCNC: 26 MMOL/L (ref 21–32)
COLOR UR: YELLOW
CREAT SERPL-MCNC: 0.9 MG/DL (ref 0.6–1.3)
EOSINOPHIL # BLD AUTO: 0 THOUSAND/ÂΜL (ref 0–0.61)
EOSINOPHIL NFR BLD AUTO: 0 % (ref 0–6)
ERYTHROCYTE [DISTWIDTH] IN BLOOD BY AUTOMATED COUNT: 18.6 % (ref 11.6–15.1)
GFR SERPL CREATININE-BSD FRML MDRD: 65 ML/MIN/1.73SQ M
GLUCOSE SERPL-MCNC: 63 MG/DL (ref 65–140)
GLUCOSE SERPL-MCNC: 64 MG/DL (ref 65–140)
GLUCOSE UR STRIP-MCNC: NEGATIVE MG/DL
HCO3 BLDA-SCNC: 24.8 MMOL/L (ref 24–30)
HCT VFR BLD AUTO: 26.4 % (ref 34.8–46.1)
HCT VFR BLD CALC: 25 % (ref 34.8–46.1)
HGB BLD-MCNC: 8.5 G/DL (ref 11.5–15.4)
HGB BLDA-MCNC: 8.5 G/DL (ref 11.5–15.4)
HGB UR QL STRIP.AUTO: NEGATIVE
HYALINE CASTS #/AREA URNS LPF: ABNORMAL /LPF
IMM GRANULOCYTES # BLD AUTO: 0.07 THOUSAND/UL (ref 0–0.2)
IMM GRANULOCYTES NFR BLD AUTO: 1 % (ref 0–2)
INR PPP: 1.91 (ref 0.84–1.19)
KETONES UR STRIP-MCNC: ABNORMAL MG/DL
LEUKOCYTE ESTERASE UR QL STRIP: ABNORMAL
LYMPHOCYTES # BLD AUTO: 0.68 THOUSANDS/ÂΜL (ref 0.6–4.47)
LYMPHOCYTES NFR BLD AUTO: 8 % (ref 14–44)
MCH RBC QN AUTO: 32.4 PG (ref 26.8–34.3)
MCHC RBC AUTO-ENTMCNC: 32.2 G/DL (ref 31.4–37.4)
MCV RBC AUTO: 101 FL (ref 82–98)
MONOCYTES # BLD AUTO: 0.72 THOUSAND/ÂΜL (ref 0.17–1.22)
MONOCYTES NFR BLD AUTO: 8 % (ref 4–12)
MUCOUS THREADS UR QL AUTO: ABNORMAL
NEUTROPHILS # BLD AUTO: 7.35 THOUSANDS/ÂΜL (ref 1.85–7.62)
NEUTS SEG NFR BLD AUTO: 83 % (ref 43–75)
NITRITE UR QL STRIP: NEGATIVE
NON-SQ EPI CELLS URNS QL MICRO: ABNORMAL /HPF
NRBC BLD AUTO-RTO: 0 /100 WBCS
P AXIS: 9 DEGREES
PCO2 BLD: 26 MMOL/L (ref 21–32)
PCO2 BLD: 26.8 MM HG (ref 42–50)
PH BLD: 7.57 [PH] (ref 7.3–7.4)
PH UR STRIP.AUTO: 7.5 [PH]
PLATELET # BLD AUTO: 257 THOUSANDS/UL (ref 149–390)
PMV BLD AUTO: 10.1 FL (ref 8.9–12.7)
PO2 BLD: 35 MM HG (ref 35–45)
POTASSIUM BLD-SCNC: 3.7 MMOL/L (ref 3.5–5.3)
POTASSIUM SERPL-SCNC: 3.9 MMOL/L (ref 3.5–5.3)
PR INTERVAL: 136 MS
PROT UR STRIP-MCNC: ABNORMAL MG/DL
PROTHROMBIN TIME: 22.1 SECONDS (ref 11.6–14.5)
QRS AXIS: 28 DEGREES
QRSD INTERVAL: 96 MS
QT INTERVAL: 296 MS
QTC INTERVAL: 404 MS
RBC # BLD AUTO: 2.62 MILLION/UL (ref 3.81–5.12)
RBC #/AREA URNS AUTO: ABNORMAL /HPF
RH BLD: NEGATIVE
SAO2 % BLD FROM PO2: 77 % (ref 60–85)
SODIUM BLD-SCNC: 137 MMOL/L (ref 136–145)
SODIUM SERPL-SCNC: 142 MMOL/L (ref 135–147)
SP GR UR STRIP.AUTO: 1.02 (ref 1–1.03)
SPECIMEN EXPIRATION DATE: NORMAL
SPECIMEN SOURCE: ABNORMAL
T WAVE AXIS: 235 DEGREES
UROBILINOGEN UR STRIP-ACNC: 2 MG/DL
VENTRICULAR RATE: 112 BPM
WBC # BLD AUTO: 8.84 THOUSAND/UL (ref 4.31–10.16)
WBC #/AREA URNS AUTO: ABNORMAL /HPF

## 2023-08-03 PROCEDURE — 72125 CT NECK SPINE W/O DYE: CPT

## 2023-08-03 PROCEDURE — 85014 HEMATOCRIT: CPT

## 2023-08-03 PROCEDURE — 85025 COMPLETE CBC W/AUTO DIFF WBC: CPT | Performed by: STUDENT IN AN ORGANIZED HEALTH CARE EDUCATION/TRAINING PROGRAM

## 2023-08-03 PROCEDURE — 81001 URINALYSIS AUTO W/SCOPE: CPT | Performed by: STUDENT IN AN ORGANIZED HEALTH CARE EDUCATION/TRAINING PROGRAM

## 2023-08-03 PROCEDURE — 80048 BASIC METABOLIC PNL TOTAL CA: CPT | Performed by: STUDENT IN AN ORGANIZED HEALTH CARE EDUCATION/TRAINING PROGRAM

## 2023-08-03 PROCEDURE — 85730 THROMBOPLASTIN TIME PARTIAL: CPT | Performed by: STUDENT IN AN ORGANIZED HEALTH CARE EDUCATION/TRAINING PROGRAM

## 2023-08-03 PROCEDURE — 93308 TTE F-UP OR LMTD: CPT | Performed by: SURGERY

## 2023-08-03 PROCEDURE — 82803 BLOOD GASES ANY COMBINATION: CPT

## 2023-08-03 PROCEDURE — 86901 BLOOD TYPING SEROLOGIC RH(D): CPT | Performed by: SURGERY

## 2023-08-03 PROCEDURE — 84295 ASSAY OF SERUM SODIUM: CPT

## 2023-08-03 PROCEDURE — 86850 RBC ANTIBODY SCREEN: CPT | Performed by: SURGERY

## 2023-08-03 PROCEDURE — 82947 ASSAY GLUCOSE BLOOD QUANT: CPT

## 2023-08-03 PROCEDURE — 36415 COLL VENOUS BLD VENIPUNCTURE: CPT | Performed by: STUDENT IN AN ORGANIZED HEALTH CARE EDUCATION/TRAINING PROGRAM

## 2023-08-03 PROCEDURE — 84132 ASSAY OF SERUM POTASSIUM: CPT

## 2023-08-03 PROCEDURE — 93010 ELECTROCARDIOGRAM REPORT: CPT | Performed by: INTERNAL MEDICINE

## 2023-08-03 PROCEDURE — 82330 ASSAY OF CALCIUM: CPT

## 2023-08-03 PROCEDURE — 99284 EMERGENCY DEPT VISIT MOD MDM: CPT | Performed by: SURGERY

## 2023-08-03 PROCEDURE — 71045 X-RAY EXAM CHEST 1 VIEW: CPT

## 2023-08-03 PROCEDURE — 70450 CT HEAD/BRAIN W/O DYE: CPT

## 2023-08-03 PROCEDURE — 93005 ELECTROCARDIOGRAM TRACING: CPT

## 2023-08-03 PROCEDURE — 86900 BLOOD TYPING SEROLOGIC ABO: CPT | Performed by: SURGERY

## 2023-08-03 PROCEDURE — 85610 PROTHROMBIN TIME: CPT | Performed by: STUDENT IN AN ORGANIZED HEALTH CARE EDUCATION/TRAINING PROGRAM

## 2023-08-03 PROCEDURE — 76705 ECHO EXAM OF ABDOMEN: CPT | Performed by: SURGERY

## 2023-08-03 RX ORDER — GINSENG 100 MG
1 CAPSULE ORAL ONCE
Status: COMPLETED | OUTPATIENT
Start: 2023-08-03 | End: 2023-08-03

## 2023-08-03 RX ADMIN — BACITRACIN ZINC 1 SMALL APPLICATION: 500 OINTMENT TOPICAL at 02:38

## 2023-08-03 NOTE — ASSESSMENT & PLAN NOTE
S/p witnessed mechanical ground level fall  +Headstrike on Eliquis for afib  Small left upper arm skin abrasion  CTH and CT cspine ordered

## 2023-08-03 NOTE — PROCEDURES
POC FAST US    Date/Time: 8/3/2023 12:51 AM    Performed by: Marvin Pickett MD  Authorized by: Marvin Pickett MD    Patient location:  Trauma  Other Assisting Provider: Yes (comment) (Fellow Tiffanie Burnett)    Procedure details:     Exam Type:  Diagnostic    Assess for:  Hemothorax, intra-abdominal fluid and pericardial effusion    Technique: FAST      Views obtained:  Heart - Pericardial sac, RUQ - Pappas's Pouch, LUQ - Splenorenal space and Suprapubic - Pouch of Severo    Image quality: diagnostic      Image availability:  Video obtained  FAST Findings:     RUQ (Hepatorenal) free fluid: absent      LUQ (Splenorenal) free fluid: absent      Suprapubic free fluid: absent      Cardiac wall motion: identified      Pericardial effusion: absent    Interpretation:     Impressions: negative      Positive findings: left pleural effusion present    Comments:      L pleural effusion vs atelectasis

## 2023-08-03 NOTE — INCIDENTAL FINDINGS
The following findings require follow up:  Radiographic finding   Finding: thyroid nodule - left 1.7 cm    Follow up required: yes   Follow up should be done within 1 month(s)    Please notify the following clinician to assist with the follow up:   Dr. Sun Fuentes's PCP    Elaina Rasmussen, [facility] [phone]     I discussed the above finding with the patient and her son, Yin Cortez, and the need for outpatient follow up with PCP.

## 2023-08-03 NOTE — ASSESSMENT & PLAN NOTE
Residual left sided weakness and chronic dysphagia on baseline puree/NTL diet  No new focal deficits

## 2023-08-03 NOTE — LETTER
[facility] [address]  Dept: [phone]    August 3, 2023     Patient: Guy Zuñiga   YOB: 1955   Date of Visit: 8/3/2023       To Whom it May Concern:    Guy Zuñiga is under my professional care. She was seen in the hospital on 8/3/2023 accompanied by her son, Nancy Beasley. If you have any questions or concerns, please don't hesitate to call.          Sincerely,          Polo Chase MD

## 2023-08-03 NOTE — ASSESSMENT & PLAN NOTE
H/o recurrent falls  Most recently admitted last month for similar with L leg hematoma and T12 compression fx  Outpatient PT/OT

## 2023-08-03 NOTE — H&P
H&P - Trauma   Belinda Rudd 70 y.o. female MRN: [id number]  Unit/Bed#: ED 05 Encounter: [id number]    Trauma Alert: Level B   Model of Arrival: Ambulance    Trauma Team: Attending Aris Najjar, Residents Eva and Fellow Joni El  Consultants:     None     Assessment/Plan   Active Problems / Assessment:     Fall on Eliquis  Ambulatory dysfunction with recent hospitalization for fall  Hx stroke with residual dysphagia and left sided weakness  Lip abrasion and skin tear to left arm  1.7 cm left thyroid nodule incidental finding  Urinary burning       Plan:   UA pending  No acute traumatic injuries identified on CTH/cspine  Discussed incidental finding of L thyroid nodule with son - recommend outpatient PCP follow up         History of Present Illness     Chief Complaint:   Mechanism:Fall     HPI:    Belinda Rudd is a 70 y.o. female with a PMH HTN, CHF W/EF 30%, GERD, SHAR, BPD, RA, ambulatory dysfunction with recent fall w/L leg hematoma and T12 compression fx, cervical dystonia, stroke with chronic dysphagia on puree/NTL diet and L sided weakness, chronic back pain, who presents s/p ground level mechanical fall on Eliquis. Unwitnessed with concern for +headstrike. Per son, patient attempted to ambulate unassisted and fell in bathroom. No LOC and was calling for help after fall. Small skin abrasion to left arm and abrasion superior to vermilion border. Son states patient was complaining of urinary burning earlier in the day. Severe kyphosis with recent T12 compression fracture. Chronic baseline macrocytic anemia with labs revealing similar (Hgb 8.5 from 9.2, 9.5, 9.6). INR 1.91. FAST negative with incidental left pleural effusion vs atelectasis. CTH and cspine negative for acute findings. Review of Systems   Constitutional: Negative. HENT: Negative. Eyes: Negative. Respiratory: Negative. Cardiovascular: Negative. Gastrointestinal: Negative. Endocrine: Negative.     Genitourinary: Negative. Musculoskeletal: Negative. Skin: Positive for wound (left arm skin abrasion). Allergic/Immunologic: Negative. Neurological: Positive for weakness (left sided - chronic s/p stroke). Hematological: Negative. Psychiatric/Behavioral: Negative. All other systems reviewed and are negative. 12-point, complete review of systems was reviewed and negative except as stated above.      Historical Information     Past Medical History:   Diagnosis Date   • Anxiety    • Asthma    • Bipolar depression ([address])    • Chronic narcotic dependence ([address])    • Chronic pain    • Depression    • Enterovirus heart infection    • SHAR (obstructive sleep apnea)    • Osteoarthritis    • Peripheral neuropathy    • Plantar fasciitis of right foot    • Senile osteoporosis    • Seronegative arthropathy of multiple sites ([address])    • Undifferentiated connective tissue disease ([address])      Past Surgical History:   Procedure Laterality Date   • BREAST IMPLANT     • FIXATION KYPHOPLASTY LUMBAR SPINE     • HYSTERECTOMY     • INCONTINENCE SURGERY N/A    • IR STROKE ALERT  2/12/2023   • NASAL SEPTOPLASTY W/ TURBINOPLASTY N/A    • RECTAL PROLAPSE REPAIR N/A    • REPAIR RECTOCELE     • RHINOPLASTY N/A         Social History     Tobacco Use   • Smoking status: Former   • Smokeless tobacco: Never   Vaping Use   • Vaping Use: Never used   Substance Use Topics   • Alcohol use: Not Currently     Comment: quit   • Drug use: Not Currently     Types: Marijuana     Comment: has medical card     Immunization History   Administered Date(s) Administered   • COVID-19 MODERNA VACC 0.5 ML IM 03/30/2021, 05/12/2021, 05/12/2021   • COVID-19 Moderna Vac BIVALENT 12 Yr+ IM (BOOSTER ONLY) 0.5 ML 03/30/2021   • H1N1, All Formulations 10/26/2009   • INFLUENZA 09/18/2012, 12/19/2013, 10/23/2014, 11/13/2014, 10/06/2015, 10/13/2016, 10/11/2017, 12/07/2017, 10/16/2018, 10/30/2020   • Influenza, seasonal, injectable 10/11/2017   • Pneumococcal Conjugate 13-Valent 08/06/2019, 08/06/2019   • Pneumococcal Polysaccharide PPV23 02/09/2021, 02/09/2021   • Td (adult), Unspecified 07/21/2021   • Tdap 02/21/2014, 11/21/2016, 07/21/2021, 07/21/2021   • Tuberculin Skin Test 03/25/2023, 04/04/2023     Last Tetanus: 2021  Family History: Non-contributory    1. Before the illness or injury that brought you to the Emergency, did you need someone to help you on a regular basis? 1=Yes   2. Since the illness or injury that brought you to the Emergency, have you needed more help than usual to take care of yourself? 1=Yes   3. Have you been hospitalized for one or more nights during the past 6 months (excluding a stay in the Emergency Department)? 1=Yes   4. In general, do you see well? 0=Yes   5. In general, do you have serious problems with your memory? 0=No   6. Do you take more than three different medications everyday? 1=Yes   TOTAL   4     Did you order a geriatric consult if the score was 2 or greater?: pending workup     Meds/Allergies   all current active meds have been reviewed, current meds:   No current facility-administered medications for this encounter. and PTA meds:   Prior to Admission Medications   Prescriptions Last Dose Informant Patient Reported? Taking? DULoxetine (CYMBALTA) 60 mg delayed release capsule   No No   Sig: Take 1 capsule (60 mg total) by mouth 2 (two) times a day   Diclofenac Sodium (VOLTAREN) 1 %   No No   Sig: Apply 2 g topically 4 (four) times a day as needed (Aching/joint pain) To neck/R shoulder.    acetaminophen (TYLENOL) 325 mg tablet   No No   Sig: Take 2 tablets (650 mg total) by mouth every 6 (six) hours   albuterol (PROVENTIL HFA,VENTOLIN HFA) 90 mcg/act inhaler  Self No No   Sig: Inhale 2 puffs every 6 (six) hours as needed for wheezing   apixaban (ELIQUIS) 5 mg   No No   Sig: Take 1 tablet (5 mg total) by mouth 2 (two) times a day   buPROPion (WELLBUTRIN) 100 mg tablet   No No   Sig: Take 1 tablet (100 mg total) by mouth 2 (two) times a day   cinacalcet (SENSIPAR) 30 mg tablet   No No   Sig: Take 1 tablet (30 mg total) by mouth daily with breakfast   docusate sodium (COLACE) 100 mg capsule   No No   Sig: Take 1 capsule (100 mg total) by mouth 2 (two) times a day   gabapentin (NEURONTIN) 300 mg capsule   No No   Sig: Take 1 capsule (300 mg total) by mouth 2 (two) times a day   hydroxychloroquine (PLAQUENIL) 200 mg tablet   No No   Sig: Take 1 tablet (200 mg total) by mouth 2 (two) times a day with meals   lamoTRIgine (LaMICtal) 150 MG tablet   No No   Sig: Take 1 tablet (150 mg total) by mouth daily at bedtime pt taking 150mg daily at bedtime   lidocaine (LIDODERM) 5 %   No No   Sig: Apply 2 patches topically over 12 hours daily at bedtime On for 12 hours, Off for 12 hours - To L outer hip and or back   magnesium oxide (MAG-OX) 400 mg tablet   No No   Sig: Take 1 tablet (400 mg total) by mouth 2 (two) times a day   melatonin 3 mg   No No   Sig: Take 1 tablet (3 mg total) by mouth daily at bedtime   metoprolol succinate (TOPROL-XL) 25 mg 24 hr tablet   No No   Sig: Take 1 tablet (25 mg total) by mouth daily   morphine (MSIR) 15 mg tablet   No No   Sig: Take 0.5 tablets (7.5 mg total) by mouth 2 (two) times a day as needed for severe pain or moderate pain Max Daily Amount: 15 mg   pantoprazole (PROTONIX) 40 mg tablet   No No   Sig: Take 1 tablet (40 mg total) by mouth daily in the early morning   senna (SENOKOT) 8.6 mg   No No   Sig: Take 1 tablet (8.6 mg total) by mouth 2 (two) times a day      Facility-Administered Medications: None        Allergies   Allergen Reactions   • Cephalosporins Other (See Comments)     rash   • Iodine - Food Allergy Anaphylaxis     Anaphylaxis     • Methocarbamol Hives   • Sulfa Antibiotics Hives   • Bactrim [Sulfamethoxazole-Trimethoprim] Hives   • Erythromycin Hives   • Penicillins    • Shrimp Extract Allergy Skin Test - Food Allergy Other (See Comments)   • Trimethoprim Hives       Objective   Initial Vitals: Temperature: (!) 97.3 °F (36.3 °C) (08/03/23 0019)  Pulse: (!) 121 (08/03/23 0019)  Respirations: 18 (08/03/23 0019)  Blood Pressure: 110/69 (08/03/23 0019)    Primary Survey:   Airway:        Status: patent;        Pre-hospital Interventions: none          Breathing:        Pre-hospital Interventions: none       Effort: normal       Right breath sounds: normal       Left breath sounds: normal  Circulation:        Rhythm: regular       Rate: regular   Right Pulses Left Pulses    R radial: 2+    R pedal: 2+     L radial: 2+    L pedal: 2+       Disability:        GCS: Eye: 4; Verbal: 5 Motor: 6 Total: 15       Right Pupil: round;  reactive         Left Pupil:  round;  reactive      R Motor Strength L Motor Strength    R : 5/5  R dorsiflex: 5/5  R plantarflex: 5/5 L : 1/5  L dorsiflex: 5/5  L plantarflex: 5/5        Sensory:  No sensory deficit  Exposure:       Completed: Yes      Secondary Survey:  Physical Exam  Vitals reviewed. Constitutional:       Appearance: She is not diaphoretic. Comments: frail   HENT:      Head: Normocephalic and atraumatic. Right Ear: External ear normal.      Left Ear: External ear normal.      Nose: Nose normal.      Mouth/Throat:      Mouth: Mucous membranes are moist.      Comments: Small cut superior to vermilion border with no active bleeding  Eyes:      Extraocular Movements: Extraocular movements intact. Conjunctiva/sclera: Conjunctivae normal.   Neck:      Comments: Severe kyphosis, no bony point tenderness  Cardiovascular:      Rate and Rhythm: Tachycardia present. Rhythm irregular. Pulmonary:      Effort: Pulmonary effort is normal. No respiratory distress. Abdominal:      General: There is no distension. Palpations: Abdomen is soft. Tenderness: There is no abdominal tenderness. Comments: Well healed prior surgical scars   Musculoskeletal:         General: Deformity (severe kyphosis and lordosis) present. No tenderness.    Skin: General: Skin is warm and dry. Comments: Small left arm skin abrasion; allevyn x 2 to backside with no underlying pressure wounds   Neurological:      General: No focal deficit present. Mental Status: She is alert. Cranial Nerves: No cranial nerve deficit. Motor: Weakness (baseline left upper extremity weakness w/ h/o stroke) present. Psychiatric:         Behavior: Behavior normal.         Thought Content: Thought content normal.         Invasive Devices     Peripheral Intravenous Line  Duration           Peripheral IV 08/03/23 Dorsal (posterior); Left Hand <1 day              Lab Results: I have personally reviewed all pertinent laboratory/test results 08/03/23 and in the preceding 24 hours. Recent Labs     08/03/23  0042 08/03/23  0046 08/03/23  0057   WBC  --   --  8.84   HGB  --  8.5* 8.5*   HCT  --  25* 26.4*   PLT  --   --  257   SODIUM 142  --   --    K 3.9  --   --      --   --    CO2 26 26  --    BUN 10  --   --    CREATININE 0.90  --   --    GLUC 63*  --   --    CAIONIZED  --  0.98*  --    PTT 43*  --   --    INR 1.91*  --   --        Imaging Results: I have personally reviewed pertinent images saved in PACS. CT scan findings (and other pertinent positive findings on images) were discussed with radiology. My interpretation of the images/reports are as follows:  Chest Xray(s): pending   FAST exam(s): negative for acute findings   CT Scan(s): CTH and CT cspine as below   Additional Xray(s): N/A     Other Studies: n/a    CTH Old right MCA territory infarctions redemonstrated, similar to the prior study. Old pontine infarctions unchanged. Atrophy unchanged. Mild microangiopathy unchanged. No acute intracranial abnormality  CT cspine No acute cervical spine fracture or traumatic malalignment. Multilevel degenerative changes are present.  There is a 1.7 cm nodule within the left lobe of the thyroid    Code Status: Prior  Advance Directive and Living Will:      Power of : POLST:    I have spent 45 minutes with Patient and family today in which greater than 50% of this time was spent in counseling/coordination of care regarding Diagnostic results, Prognosis, Risks and benefits of tx options, Instructions for management, Patient and family education, Importance of tx compliance, Risk factor reductions, Impressions, Counseling / Coordination of care, Documenting in the medical record, Reviewing / ordering tests, medicine, procedures  , Obtaining or reviewing history   and Communicating with other healthcare professionals .

## 2023-08-04 NOTE — CASE COMMUNICATION
Family  reports  another  fall  last evening. No  fracture  or  injury  reported  however  patient  reports  being  stiff and  sore  and  declined   FATUMA  visit  for  today. Daughter  requested  visit  either  8/7  or  8/8.   Referring  MD  notified  of  delay  in  [address]  at  [address] [facility]  request

## 2023-08-07 ENCOUNTER — APPOINTMENT (OUTPATIENT)
Dept: RADIOLOGY | Age: 68
End: 2023-08-07
Payer: MEDICARE

## 2023-08-07 DIAGNOSIS — S22.000A THORACIC COMPRESSION FRACTURE (HCC): ICD-10-CM

## 2023-08-07 PROCEDURE — 72080 X-RAY EXAM THORACOLMB 2/> VW: CPT

## 2023-08-08 ENCOUNTER — HOME CARE VISIT (OUTPATIENT)
Dept: HOME HEALTH SERVICES | Facility: HOME HEALTHCARE | Age: 68
End: 2023-08-08
Payer: MEDICARE

## 2023-08-10 ENCOUNTER — HOME CARE VISIT (OUTPATIENT)
Dept: HOME HEALTH SERVICES | Facility: HOME HEALTHCARE | Age: 68
End: 2023-08-10
Payer: MEDICARE

## 2023-08-10 ENCOUNTER — OFFICE VISIT (OUTPATIENT)
Dept: NEUROSURGERY | Facility: CLINIC | Age: 68
End: 2023-08-10
Payer: MEDICARE

## 2023-08-10 VITALS
DIASTOLIC BLOOD PRESSURE: 68 MMHG | BODY MASS INDEX: 19.29 KG/M2 | HEIGHT: 63 IN | SYSTOLIC BLOOD PRESSURE: 102 MMHG | TEMPERATURE: 98.2 F

## 2023-08-10 DIAGNOSIS — S22.000A COMPRESSION FRACTURE OF BODY OF THORACIC VERTEBRA (HCC): Primary | ICD-10-CM

## 2023-08-10 PROBLEM — I50.42 CHRONIC COMBINED SYSTOLIC AND DIASTOLIC HEART FAILURE (HCC): Status: ACTIVE | Noted: 2023-01-01

## 2023-08-10 PROBLEM — I27.21 PULMONARY ARTERY HYPERTENSION (HCC): Status: ACTIVE | Noted: 2023-08-10

## 2023-08-10 PROBLEM — J96.01 ACUTE RESPIRATORY FAILURE WITH HYPOXIA (HCC): Status: ACTIVE | Noted: 2023-01-01

## 2023-08-10 PROBLEM — A41.9 SEPSIS (HCC): Status: ACTIVE | Noted: 2023-01-01

## 2023-08-10 PROBLEM — S22.089A CLOSED T12 FRACTURE (HCC): Status: ACTIVE | Noted: 2023-08-10

## 2023-08-10 PROCEDURE — 99213 OFFICE O/P EST LOW 20 MIN: CPT | Performed by: NURSE PRACTITIONER

## 2023-08-10 NOTE — PROGRESS NOTES
Neurosurgery Office Note  Belinda Proper 70 y.o. female MRN: [id number]      Assessment/Plan     Cerebral aneurysm, nonruptured  Follows with Dr. Lizzie Winkler for 3 mm right superior hypophyseal artery aneurysm. Due for repeat MRA in December for surveillance. Acute CVA (cerebrovascular accident) [facility])  S/p right MCA thrombectomy with Dr. Lizzie Winkler 12/12/2022. Closed T12 fracture (HCC)  T12 compression fracture in setting of chronic T9, T11 fractures  · Hx of L1 kyphoplasty. · Presented to [facility] 7/7 s/p fall. · Noted with above finding while in rehab at [facility] on 7/22. · Managed in TLSO brace. · Continues to endorse significant pain to T12 area that is constant. · Managed with morphine. · On exam, LUE spastic hemiplegia, LLE weakness 3/5. Otherwise 5/5. Frail, lethargic. Imaging:  · Thoracolumbar x-rays, 8/7/2023: Stable mild compression fractures at T9 and T11. Compression at L1 which is similar. Compression fracture of T12 which is similar or minimally worse. Plan:  · Reviewed imaging extensively with patient and her daughter Yvette Cheek. · Ordered LSO brace as she was finding TLSO very uncomfortable and it was rubbing against her thoracic spine. · She is frail and of short stature so this should adequately cover T12. · Continue home pain regimen. · Briefly discussed utility of kyphoplasty however she feels prior L1 kyphoplasty made her worse. Additionally I don't recognize a mechanical component to her pain. · Follow up in 1 month with repeat thoracolumbar x-rays.        Diagnoses and all orders for this visit:    Compression fracture of body of thoracic vertebra (HCC)  -     Tlso Back Brace  -     XR spine thoracolumbar 2 vw; Future          I have spent a total time of 30 minutes on 08/10/23 in caring for this patient including Diagnostic results, Risks and benefits of tx options, Instructions for management, Patient and family education, Importance of tx compliance, Risk factor reductions, Impressions, Counseling / Coordination of care, Documenting in the medical record, Reviewing / ordering tests, medicine, procedures   and Obtaining or reviewing history  . CHIEF COMPLAINT    Chief Complaint   Patient presents with   • Follow-up       HISTORY    History of Present Illness     70y.o. year old female     With past medical history of asthma, bipolar depression, osteoarthritis, who is status post right MCA stroke in December 2022, cerebral aneurysm, who presents for follow-up for T12 fracture. On 7/7 she presented to [facility] ED after sustaining a fall at home. She has been experiencing frequent falls. She was admitted to the hospital and then discharged to [facility] for rehabilitation. On 7/22 while she was in [facility] she complained of low back pain and x-rays were completed which indicated subacute T12 fracture in setting of chronic thoracic and lumbar fractures. She has a history of L1 kyphoplasty in 2011 at [address] [place] [address]. She states she felt this procedure actually worsened her pain at the time. She also follows with a rheumatologist for a connective tissue disorder. She was diagnosed with osteoporosis and was supposed to start Prolia. Per her daughter Antonella Victor, they are planning to follow-up with her to restart Prolia. She continues to endorse pain at her T12 area locally that is constant. She finds the TLSO brace very uncomfortable and rubbing against her spine. Pain is somewhat relieved with morphine. She is also using lidocaine patches. She has physical therapy at home. She is accompanied today by her daughter Antonella Victor. She lives at home with her son and his girlfriend. They are her caregivers. See Discussion    REVIEW OF SYSTEMS    Review of Systems   Constitutional: Negative. HENT: Negative. Eyes: Negative. Respiratory: Negative. Cardiovascular: Negative. Gastrointestinal: Negative.     Genitourinary: Positive for difficulty urinating (history of UTI). Musculoskeletal: Positive for back pain (mid to lower back pain radiates down to left leg ocassionally to left thigh) and gait problem (uses walker at home wheel chair for long distances feels unbalanced due to weakness and pain). Skin: Negative. Neurological: Positive for weakness (weak left  and side post stroke) and numbness (left side and toes due to stroke). Hematological: Bruises/bleeds easily (medication). Psychiatric/Behavioral: Negative for sleep disturbance. ROS obtained by MA. Reviewed. See HPI. Meds/Allergies     Current Outpatient Medications   Medication Sig Dispense Refill   • acetaminophen (TYLENOL) 325 mg tablet Take 2 tablets (650 mg total) by mouth every 6 (six) hours  0   • albuterol (PROVENTIL HFA,VENTOLIN HFA) 90 mcg/act inhaler Inhale 2 puffs every 6 (six) hours as needed for wheezing 18 g 0   • apixaban (ELIQUIS) 5 mg Take 1 tablet (5 mg total) by mouth 2 (two) times a day 60 tablet 0   • buPROPion (WELLBUTRIN) 100 mg tablet Take 1 tablet (100 mg total) by mouth 2 (two) times a day 60 tablet 0   • cinacalcet (SENSIPAR) 30 mg tablet Take 1 tablet (30 mg total) by mouth daily with breakfast 30 tablet 0   • Diclofenac Sodium (VOLTAREN) 1 % Apply 2 g topically 4 (four) times a day as needed (Aching/joint pain) To neck/R shoulder.   0   • docusate sodium (COLACE) 100 mg capsule Take 1 capsule (100 mg total) by mouth 2 (two) times a day 60 capsule 0   • DULoxetine (CYMBALTA) 60 mg delayed release capsule Take 1 capsule (60 mg total) by mouth 2 (two) times a day 60 capsule 0   • gabapentin (NEURONTIN) 300 mg capsule Take 1 capsule (300 mg total) by mouth 2 (two) times a day 60 capsule 0   • hydroxychloroquine (PLAQUENIL) 200 mg tablet Take 1 tablet (200 mg total) by mouth 2 (two) times a day with meals 60 tablet 0   • lamoTRIgine (LaMICtal) 150 MG tablet Take 1 tablet (150 mg total) by mouth daily at bedtime pt taking 150mg daily at bedtime 30 tablet 0   • lidocaine (LIDODERM) 5 % Apply 2 patches topically over 12 hours daily at bedtime On for 12 hours, Off for 12 hours - To L outer hip and or back 30 patch 0   • magnesium oxide (MAG-OX) 400 mg tablet Take 1 tablet (400 mg total) by mouth 2 (two) times a day 60 tablet 0   • melatonin 3 mg Take 1 tablet (3 mg total) by mouth daily at bedtime 30 tablet 0   • metoprolol succinate (TOPROL-XL) 25 mg 24 hr tablet Take 1 tablet (25 mg total) by mouth daily 30 tablet 0   • morphine (MSIR) 15 mg tablet Take 0.5 tablets (7.5 mg total) by mouth 2 (two) times a day as needed for severe pain or moderate pain Max Daily Amount: 15 mg  0   • pantoprazole (PROTONIX) 40 mg tablet Take 1 tablet (40 mg total) by mouth daily in the early morning 30 tablet 0   • senna (SENOKOT) 8.6 mg Take 1 tablet (8.6 mg total) by mouth 2 (two) times a day 60 tablet 0     No current facility-administered medications for this visit.        Allergies   Allergen Reactions   • Cephalosporins Other (See Comments)     rash   • Iodine - Food Allergy Anaphylaxis     Anaphylaxis     • Methocarbamol Hives   • Sulfa Antibiotics Hives   • Bactrim [Sulfamethoxazole-Trimethoprim] Hives   • Erythromycin Hives   • Penicillins    • Shrimp Extract Allergy Skin Test - Food Allergy Other (See Comments)   • Trimethoprim Hives       PAST HISTORY    Past Medical History:   Diagnosis Date   • Anxiety    • Asthma    • Bipolar depression (HCC)    • Chronic narcotic dependence (HCC)    • Chronic pain    • Depression    • Enterovirus heart infection    • SHAR (obstructive sleep apnea)    • Osteoarthritis    • Peripheral neuropathy    • Plantar fasciitis of right foot    • Senile osteoporosis    • Seronegative arthropathy of multiple sites ([address])    • Undifferentiated connective tissue disease (HCC)        Past Surgical History:   Procedure Laterality Date   • BREAST IMPLANT     • FIXATION KYPHOPLASTY LUMBAR SPINE     • HYSTERECTOMY     • INCONTINENCE SURGERY N/A    • IR STROKE ALERT 2/12/2023   • NASAL SEPTOPLASTY W/ TURBINOPLASTY N/A    • RECTAL PROLAPSE REPAIR N/A    • REPAIR RECTOCELE     • RHINOPLASTY N/A        Social History     Tobacco Use   • Smoking status: Former   • Smokeless tobacco: Never   Vaping Use   • Vaping Use: Never used   Substance Use Topics   • Alcohol use: Not Currently     Comment: quit   • Drug use: Not Currently     Types: Marijuana     Comment: has medical card       Family History   Problem Relation Age of Onset   • No Known Problems Mother    • No Known Problems Father    • Arthritis Family    • Diabetes Family    • Stroke Family          Above history personally reviewed. EXAM    Vitals:Blood pressure 102/68, temperature 98.2 °F (36.8 °C), temperature source Temporal, height 5' 3" (1.6 m). ,Body mass index is 19.29 kg/m². Physical Exam  Constitutional:       Appearance: She is well-developed. She is ill-appearing. Comments: Drowsy   HENT:      Head: Normocephalic. Comments: Ecchymosis above top lip  Eyes:      Extraocular Movements: EOM normal.      Pupils: Pupils are equal, round, and reactive to light. Pulmonary:      Effort: Pulmonary effort is normal.   Musculoskeletal:         General: Tenderness present. Normal range of motion. Cervical back: Normal range of motion and neck supple. Skin:     General: Skin is warm and dry. Neurological:      Mental Status: She is oriented to person, place, and time. Mental status is at baseline. Cranial Nerves: Cranial nerves 2-12 are intact. Motor: Weakness present. Coordination: Coordination abnormal.   Psychiatric:         Speech: Speech normal.         Neurologic Exam     Mental Status   Oriented to person, place, and time. Attention: decreased. Concentration: decreased. Speech: speech is normal   Level of consciousness: drowsy  Dysphonia and dysarthria     Cranial Nerves   Cranial nerves II through XII intact.      CN III, IV, VI   Pupils are equal, round, and reactive to light.  Extraocular motions are normal.   Right pupil: Size: 3 mm. Shape: regular. Reactivity: brisk. Consensual response: intact. Accommodation: intact. Left pupil: Size: 3 mm. Shape: regular. Reactivity: brisk. Consensual response: intact. Accommodation: intact. Nystagmus: none   Diplopia: none  Conjugate gaze: present    CN V   Right facial sensation deficit: none  Left facial sensation deficit: none    CN VII   Facial expression full, symmetric. CN VIII   Hearing: intact    CN IX, X   Palate: symmetric    CN XI   Right sternocleidomastoid strength: normal  Left sternocleidomastoid strength: normal  Right trapezius strength: normal  Left trapezius strength: normal    CN XII   Tongue: not atrophic  Fasciculations: absent  Tongue deviation: none    Motor Exam   Muscle bulk: normal  Overall muscle tone: normal  Right arm pronator drift: absent  Left arm pronator drift: absent    Strength   Right deltoid: 5/5  Left deltoid: 0/5  Right biceps: 5/5  Left biceps: 0/5  Right triceps: 5/5  Left triceps: 0/5  Right wrist flexion: 5/5  Left wrist flexion: 0/5  Right wrist extension: 5/5  Left wrist extension: 0/5  Right interossei: 5/5  Left interossei: 0/5  Right quadriceps: 5/5  Left quadriceps: 3/5  Right hamstrin/5  Left hamstring: 3/5  Right glutei: 5/5  Left glutei: 3/5  Right anterior tibial: 5/5  Left anterior tibial: 3/5  Right posterior tibial: 5/5  Left posterior tibial: 3/5  Right peroneal: 5/5  Left peroneal: 3/5  Right gastroc: 5/5  Left gastroc: 3/5    Sensory Exam   Light touch normal.   Proprioception normal.     Gait, Coordination, and Reflexes     Tremor   Resting tremor: absent  Intention tremor: absent  Action tremor: absent        MEDICAL DECISION MAKING    Imaging Studies:     XR spine thoracolumbar 2 vw    Result Date: 2023  Narrative: THORACOLUMBAR SPINE INDICATION:   S22.000A: Wedge compression fracture of unspecified thoracic vertebra, initial encounter for closed fracture. COMPARISON: 07/21/2023 VIEWS:  XR SPINE THORACOLUMBAR 2 VW Images: 3 FINDINGS: Stable mild compression fractures of T9 and T11. Compression fracture of T12 which is similar or minimally worse compared to the prior study. Kyphoplasty and compression at L1 which is similar. Stable minimal loss of height in the superior endplate of L4. Dextroscoliosis. Moderate degenerative changes. There is no displacement of the paraspinal line. The pedicles appear intact. Impression: Stable mild compression fractures at T9 and T11. Compression at L1 which is similar. Compression fracture of T12 which is similar or minimally worse. Workstation performed: [id number]     XR chest 1 view    Result Date: 8/3/2023  Narrative: TRAUMA SERIES INDICATION:  TRAUMA. Fall from standing. COMPARISON: July 6, 2023. VIEWS:  XR TRAUMA MULTIPLE A single AP semierect portable chest radiograph is submitted. FINDINGS: CHEST: Mild cardiomegaly appears similar to the prior study. There is bibasilar atelectasis; the atelectasis at the right base appears somewhat improved compared to the prior study. Possible small pleural effusions. No evidence of pneumothorax. No evidence of displaced rib fractures. Impression: Mild interval improvement. Workstation performed: [id number]     [address] bedside procedure    Result Date: 8/3/2023  Narrative: [id number]. [id number]    XR Trauma multiple ([facility] trauma bay ONLY)    Result Date: 8/3/2023  Narrative: TRAUMA SERIES INDICATION:  TRAUMA. Fall from standing. COMPARISON: July 6, 2023. VIEWS:  XR TRAUMA MULTIPLE A single AP semierect portable chest radiograph is submitted. FINDINGS: CHEST: Mild cardiomegaly appears similar to the prior study. There is bibasilar atelectasis; the atelectasis at the right base appears somewhat improved compared to the prior study. Possible small pleural effusions. No evidence of pneumothorax. No evidence of displaced rib fractures.      Impression: Mild interval improvement. Workstation performed: [id number]     TRAUMA - CT spine cervical wo contrast    Result Date: 8/3/2023  Narrative: CT CERVICAL SPINE - WITHOUT CONTRAST INDICATION:   TRAUMA. Patient fell. COMPARISON: July 19, 2023. TECHNIQUE:  CT examination of the cervical spine was performed without intravenous contrast.  Contiguous axial images were obtained. Multiplanar 2D reformatted images were created from the source data. Radiation dose length product (DLP) for this visit:  169.91 mGy-cm . This examination, like all CT scans performed in the [facility], was performed utilizing techniques to minimize radiation dose exposure, including the use of iterative  reconstruction and automated exposure control. IMAGE QUALITY:  Diagnostic. FINDINGS: ALIGNMENT: Alignment appears unchanged compared to the prior study. Exaggeration of cervical lordosis is similar to the prior study. Mild degenerative retrolisthesis of C3 upon C4 and mild degenerative anterolisthesis of C4 upon C5 appears unchanged. Mild degenerative anterolisthesis of T1 upon T2 unchanged. No jumped facets. VERTEBRAE: No acute fracture. DEGENERATIVE CHANGES: Moderate to advanced multilevel degenerative changes are present. PREVERTEBRAL AND PARASPINAL SOFT TISSUES: There is a 1.7 cm nodule within the left lobe of the thyroid. Incidental discovery of one or more thyroid nodule(s) measuring more than 1.5 cm is noted in this patient who is above 28years old; according to guidelines published in the February 2015 white paper on incidental thyroid nodules in the Journal of the [employer] of Radiology Letty Kill), further characterization with thyroid ultrasound is recommended. THORACIC INLET: Unremarkable. Impression: No acute cervical spine fracture or traumatic malalignment. Multilevel degenerative changes are present. There is a 1.7 cm nodule within the left lobe of the thyroid.  Nonemergent outpatient thyroid ultrasound is recommended for further evaluation. This examination demonstrates findings for which imaging follow-up is recommended and was logged as such in EPIC. I personally discussed this study with Vickie Lemus on 8/3/2023 1:12 AM. Workstation performed: [id number]     TRAUMA - CT head wo contrast    Result Date: 8/3/2023  Narrative: CT BRAIN - WITHOUT CONTRAST INDICATION:   TRAUMA. Fall from standing, positive head strike, blood thinners. COMPARISON: July 19, 2023. TECHNIQUE:  CT examination of the brain was performed. Multiplanar 2D reformatted images were created from the source data. Radiation dose length product (DLP) for this visit:  929.32 mGy-cm . This examination, like all CT scans performed in the [facility], was performed utilizing techniques to minimize radiation dose exposure, including the use of iterative  reconstruction and automated exposure control. IMAGE QUALITY:  Diagnostic. FINDINGS: PARENCHYMA:  No intracranial mass, positive mass effect or midline shift. No CT signs of acute infarction. No acute parenchymal hemorrhage. Old right MCA territory infarctions redemonstrated, similar to the prior study. Old pontine infarctions unchanged. Atrophy unchanged. Mild microangiopathy unchanged. VENTRICLES AND EXTRA-AXIAL SPACES: Ex vacuo dilatation right ventricle unchanged. No hydrocephalus. No acute extra-axial hemorrhage. VISUALIZED ORBITS: Prior lens surgery. PARANASAL SINUSES: Mucosal thickening bilateral maxillary sinuses, left greater than right. CALVARIUM AND EXTRACRANIAL SOFT TISSUES: No evidence of acute calvarial fracture. 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 Degenerative change bilateral temporomandibular joints redemonstrated. Chronic nasal bone deformities  unchanged. Impression: No acute intracranial abnormality. No significant interval change. Please see discussion.  I personally discussed this study with Vickie Lemus on 8/3/2023 1:13 ANNMARIE. Workstation performed: [id number]     Echo follow up/limited w/ contrast if indicated    Result Date: 7/28/2023  Narrative: •  Left Ventricle: Left ventricular cavity size is mildly dilated. Wall thickness is normal. The left ventricular ejection fraction is 30%. Systolic function is severely reduced. There is severe global hypokinesis with regional variation. •  Aortic Valve: There is mild to moderate regurgitation. •  Left Atrium: The atrium is dilated. •  Mitral Valve: There is mild regurgitation. •  Tricuspid Valve: There is mild regurgitation. •  Prior TTE study available for comparison. Prior study date: 2/12/2023. No significant changes noted compared to the prior study. XR shoulder 2+ vw right    Result Date: 7/26/2023  Narrative: RIGHT SHOULDER INDICATION:   Intermittent increased R shoulder pain. COMPARISON: 03/02/2022 VIEWS:  XR SHOULDER 2+ VW RIGHT Images: 3 FINDINGS: There is no acute fracture or dislocation. Mild degenerative change at the [facility] joint. Moderate degenerative change at the glenohumeral joint. No lytic or blastic osseous lesion. Soft tissues are unremarkable. Impression: No acute osseous abnormality. Workstation performed: [id number]     XR spine lumbar 2 or 3 views injury    Result Date: 7/21/2023  Narrative: LUMBAR SPINE INDICATION:   back pain s/p fall. COMPARISON: 2/12/2023 VIEWS:  XR SPINE LUMBAR 2 OR 3 VIEWS INJURY FINDINGS: There are 5 non rib bearing lumbar vertebral bodies. No acute fracture. There are stable chronic compression deformities at L1 and L4 post kyphoplasty at L1. Alignment is unremarkable. No significant lumbar degenerative change noted. The pedicles appear intact. Soft tissues are unremarkable. Impression: No acute fracture. Stable chronic compression deformities at L1 and L4. Workstation performed: [id number]     XR spine thoracic 3 vw    Result Date: 7/21/2023  Narrative: THORACIC SPINE INDICATION:   back pain.  COMPARISON: 6/30/2023 VIEWS:  XR SPINE THORACIC 3 VW FINDINGS: There are chronic compression fractures of T9 and T11. There is mild anterior wedging of the T12 vertebral body which was not present on the recent prior CT. There is stable moderate kyphosis secondary to L1 compression fracture status post kyphoplasty. No significant degenerative changes. There is no displacement of the paraspinal line. The pedicles appear intact. Impression: 1. Mild anterior wedging of T12, not present on the recent prior CT. A fracture line is not visualized and this is age-indeterminate. 2.  Otherwise stable chronic compression fractures at T9, T11, and L1 with kyphoplasty at L1. 3.  Kyphosis at the thoracolumbar junction. The study was marked in [facility] for immediate notification. Workstation performed: [id number]     CT spine cervical wo contrast    Result Date: 7/19/2023  Narrative: CT CERVICAL SPINE - WITHOUT CONTRAST INDICATION:   Spine fracture, cervical, traumatic Unwitnessed fall in hospital on anticoagulation. COMPARISON: 6/30/2023 TECHNIQUE:  CT examination of the cervical spine was performed without intravenous contrast.  Contiguous axial images were obtained. Multiplanar 2D reformatted images were created from the source data. Radiation dose length product (DLP) for this visit:  252.51 mGy-cm . This examination, like all CT scans [facility], was performed utilizing techniques to minimize radiation dose exposure, including the use of iterative  reconstruction and automated exposure control. IMAGE QUALITY:  Diagnostic. FINDINGS: ALIGNMENT: No evidence for traumatic malalignment. Atlantodental distance is preserved. Craniocervical junction is unremarkable. Mild retrolisthesis of C3-C4 and trace anterolisthesis of C4-C5. VERTEBRAE: Vertebral body heights are maintained. No acute fractures are seen.  DEGENERATIVE CHANGES: There is multilevel canal stenosis and foraminal narrowing secondary to disc osteophyte complexes, uncovertebral spurring and facet arthrosis. PREVERTEBRAL AND PARASPINAL SOFT TISSUES: Low-attenuation left thyroid lobe nodule. No significant prevertebral or paravertebral soft tissue swelling. Unremarkable THORACIC INLET:  Normal.     Impression: No acute fracture or evidence for traumatic malalignment. No interval change since prior exam. Workstation performed: [id number]     CT head wo contrast    Result Date: 7/19/2023  Narrative: CT BRAIN - WITHOUT CONTRAST INDICATION:   Status post unwitnessed fall. . COMPARISON: 6/30/2023 TECHNIQUE:  CT examination of the brain was performed. Multiplanar 2D reformatted images were created from the source data. Radiation dose length product (DLP) for this visit:  912.88 mGy-cm . This examination, like all CT scans performed in the [facility], was performed utilizing techniques to minimize radiation dose exposure, including the use of iterative  reconstruction and automated exposure control. IMAGE QUALITY:  Diagnostic. FINDINGS: PARENCHYMA:  No intracranial mass, mass effect or midline shift. No CT signs of acute infarction. No acute parenchymal hemorrhage. Stable chronic right MCA distribution infarct. Atherosclerotic intracranial calcification. Involutional change. VENTRICLES AND EXTRA-AXIAL SPACES: Ex vacuo dilatation right lateral ventricle. VISUALIZED ORBITS: The patient is status post bilateral cataract surgery. PARANASAL SINUSES: Partial opacification of the left maxillary sinus. CALVARIUM AND EXTRACRANIAL SOFT TISSUES: No depressed calvarial fractures. Chronic nasal bone deformities, unchanged. Stable alignment of the chronic right mandibular coronoid process fracture. Stable bilateral temporomandibular joint arthrosis. Impression: No interval change. No acute intracranial hemorrhage or depressed calvarial fracture identified. Chronic right MCA distribution infarct.  Workstation performed: [id number]     XR hip/pelv 2-3 vws left if performed    Result Date: 7/19/2023  Narrative: LEFT HIP INDICATION:   Fall c/ L buttock/hip pain. COMPARISON: CT pelvis from 7/4/2023. VIEWS:  XR HIP/PELV 2-3 VWS LEFT  W PELVIS IF PERFORMED FINDINGS: There is no acute fracture or dislocation. No significant hip degenerative changes. No lytic or blastic osseous lesion. Soft tissues are unremarkable. The visualized lumbar spine is unremarkable. Impression: No acute osseous abnormality. Workstation performed: [id number]       I have personally reviewed pertinent reports.    and I have personally reviewed pertinent films in PACS

## 2023-08-10 NOTE — ED NOTES
RN spoke with pt demo contact, daughter Deena Huber. Deena Vermassom relayed pt had been more lethargic today and when at her doctor appointment,  stated pt could not return home due to pt oxygen. Deena Huber states pt take morphine, 15mg in two half doses/day and takes the first dose PRN with the second to follow when needed. Pt normally can speak to her daughter but not quite as well as normal, unimpaired people. Pt has attempting verbal since here but primarily writing for communication. Provider is aware of all information.       Genesis Samuel RN  08/10/23 1930

## 2023-08-10 NOTE — SEPSIS NOTE
Sepsis Note   Ana Rosa Fraire 70 y.o. female MRN: [id number]  Unit/Bed#: ED-14 Encounter: [id number]       Initial Sepsis Screening     Row Name 08/10/23 1637                Is the patient's history suggestive of a new or worsening infection? Yes (Proceed)  -MB        Suspected source of infection pneumonia  -MB        Indicate SIRS criteria Tachypnea > 20 resp per min; Hyperthemia > 38.3C (100.9F) OR Hypothermia <36C (96.8F)  -MB        Are two or more of the above signs & symptoms of infection both present and new to the patient? Yes (Proceed)  -MB        Assess for evidence of organ dysfunction: Are any of the below criteria present within 6 hours of suspected infection and SIRS criteria that are NOT considered to be chronic conditions? Lactate > 2. 0;INR > 1.5 or aPTT > 60 secs  -MB        Date of presentation of severe sepsis 08/10/23  -MB        Time of presentation of severe sepsis 1638  -MB        Sepsis Note: Click "NEXT" below (NOT "close") to generate sepsis note based on above information. YES (proceed by clicking "NEXT")  -MB              User Key  (r) = Recorded By, (t) = Taken By, (c) = Cosigned By    [facility] Name Provider Type    MANASA Garcia DO Resident                Default Flowsheet Data (last 720 hours)     Sepsis Reassess     Row Name 08/10/23 1802                   Repeat Volume Status and Tissue Perfusion Assessment Performed    Date of Reassessment: 08/10/23  -MB        Time of Reassessment: 1803  -MB        Sepsis Reassessment Note: Click "NEXT" below (NOT "close") to generate sepsis reassessment note. YES (proceed by clicking "NEXT")  -MB        Repeat Volume Status and Tissue Perfusion Assessment Performed --              User Key  (r) = Recorded By, (t) = Taken By, (c) = Cosigned By    [facility] [facility] Name Provider Type    MANASA Garcia DO Resident                Body mass index is 22.18 kg/m².   Wt Readings from Last 1 Encounters:   08/10/23 56.8 kg (125 lb 3.5 oz)     IBW (Ideal Body Weight): 52.4 kg    Ideal body weight: 52.4 kg (115 lb 8.3 oz)  Adjusted ideal body weight: 54.2 kg (119 lb 6.4 oz)

## 2023-08-10 NOTE — ASSESSMENT & PLAN NOTE
Follows with Dr. 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 Due for repeat MRA in December for surveillance.

## 2023-08-10 NOTE — ASSESSMENT & PLAN NOTE
T12 compression fracture in setting of chronic T9, T11 fractures  · Hx of L1 kyphoplasty. · Presented to [facility] 7/7 s/p fall. · Noted with above finding while in rehab at [facility] on 7/22. · Managed in TLSO brace. · Continues to endorse significant pain to T12 area that is constant. · Managed with morphine. · On exam, LUE spastic hemiplegia, LLE weakness 3/5. Otherwise 5/5. Frail, lethargic. Imaging:  · Thoracolumbar x-rays, 8/7/2023: Stable mild compression fractures at T9 and T11. Compression at L1 which is similar. Compression fracture of T12 which is similar or minimally worse. Plan:  · Reviewed imaging extensively with patient and her daughter Kellie Mcclellan. · Ordered LSO brace as she was finding TLSO very uncomfortable and it was rubbing against her thoracic spine. · She is frail and of short stature so this should adequately cover T12. · Continue home pain regimen. · Briefly discussed utility of kyphoplasty however she feels prior L1 kyphoplasty made her worse. Additionally I don't recognize a mechanical component to her pain. · Follow up in 1 month with repeat thoracolumbar x-rays.

## 2023-08-10 NOTE — ED NOTES
Post CT scan, pt appeared to be less alert and breathing harder. RN unable to obtain accurate oxygenation saturation. Provider made aware of pt status changes.       Lesley Rosa RN  08/10/23 1857

## 2023-08-10 NOTE — ED PROVIDER NOTES
History  Chief Complaint   Patient presents with   • Altered Mental Status     EMS reports family indicated change in mentation over the past couple of days. Pt hx of stroke and frequent falls. Patient is a 69-year-old female past medical history of asthma CHF prior CVA presenting via EMS for evaluation of altered mental status and shortness of breath. Patient is arriving from her physician's office was noted to have change in mentation over the past few days. History is severely limited secondary to patient's mental status and work of breathing          Prior to Admission Medications   Prescriptions Last Dose Informant Patient Reported? Taking? DULoxetine (CYMBALTA) 60 mg delayed release capsule   No No   Sig: Take 1 capsule (60 mg total) by mouth 2 (two) times a day   Diclofenac Sodium (VOLTAREN) 1 %   No No   Sig: Apply 2 g topically 4 (four) times a day as needed (Aching/joint pain) To neck/R shoulder.    acetaminophen (TYLENOL) 325 mg tablet   No No   Sig: Take 2 tablets (650 mg total) by mouth every 6 (six) hours   albuterol (PROVENTIL HFA,VENTOLIN HFA) 90 mcg/act inhaler  Self No No   Sig: Inhale 2 puffs every 6 (six) hours as needed for wheezing   apixaban (ELIQUIS) 5 mg   No No   Sig: Take 1 tablet (5 mg total) by mouth 2 (two) times a day   buPROPion (WELLBUTRIN) 100 mg tablet   No No   Sig: Take 1 tablet (100 mg total) by mouth 2 (two) times a day   cinacalcet (SENSIPAR) 30 mg tablet   No No   Sig: Take 1 tablet (30 mg total) by mouth daily with breakfast   docusate sodium (COLACE) 100 mg capsule   No No   Sig: Take 1 capsule (100 mg total) by mouth 2 (two) times a day   gabapentin (NEURONTIN) 300 mg capsule   No No   Sig: Take 1 capsule (300 mg total) by mouth 2 (two) times a day   hydroxychloroquine (PLAQUENIL) 200 mg tablet   No No   Sig: Take 1 tablet (200 mg total) by mouth 2 (two) times a day with meals   lamoTRIgine (LaMICtal) 150 MG tablet   No No   Sig: Take 1 tablet (150 mg total) by mouth daily at bedtime pt taking 150mg daily at bedtime   lidocaine (LIDODERM) 5 %   No No   Sig: Apply 2 patches topically over 12 hours daily at bedtime On for 12 hours, Off for 12 hours - To L outer hip and or back   magnesium oxide (MAG-OX) 400 mg tablet   No No   Sig: Take 1 tablet (400 mg total) by mouth 2 (two) times a day   melatonin 3 mg   No No   Sig: Take 1 tablet (3 mg total) by mouth daily at bedtime   metoprolol succinate (TOPROL-XL) 25 mg 24 hr tablet   No No   Sig: Take 1 tablet (25 mg total) by mouth daily   morphine (MSIR) 15 mg tablet   No No   Sig: Take 0.5 tablets (7.5 mg total) by mouth 2 (two) times a day as needed for severe pain or moderate pain Max Daily Amount: 15 mg   pantoprazole (PROTONIX) 40 mg tablet   No No   Sig: Take 1 tablet (40 mg total) by mouth daily in the early morning   senna (SENOKOT) 8.6 mg   No No   Sig: Take 1 tablet (8.6 mg total) by mouth 2 (two) times a day      Facility-Administered Medications: None       Past Medical History:   Diagnosis Date   • Anxiety    • Asthma    • Bipolar depression (HCC)    • Chronic narcotic dependence (HCC)    • Chronic pain    • Depression    • Enterovirus heart infection    • SHAR (obstructive sleep apnea)    • Osteoarthritis    • Peripheral neuropathy    • Plantar fasciitis of right foot    • Senile osteoporosis    • Seronegative arthropathy of multiple sites ([address])    • Undifferentiated connective tissue disease (HCC)        Past Surgical History:   Procedure Laterality Date   • BREAST IMPLANT     • FIXATION KYPHOPLASTY LUMBAR SPINE     • HYSTERECTOMY     • INCONTINENCE SURGERY N/A    • IR STROKE ALERT  2/12/2023   • NASAL SEPTOPLASTY W/ TURBINOPLASTY N/A    • RECTAL PROLAPSE REPAIR N/A    • REPAIR RECTOCELE     • RHINOPLASTY N/A        Family History   Problem Relation Age of Onset   • No Known Problems Mother    • No Known Problems Father    • Arthritis Family    • Diabetes Family    • Stroke Family      I have reviewed and agree with the history as documented. E-Cigarette/Vaping   • E-Cigarette Use Never User      E-Cigarette/Vaping Substances   • Nicotine No    • THC No    • CBD No    • Flavoring No    • Other No    • Unknown No      Social History     Tobacco Use   • Smoking status: Former   • Smokeless tobacco: Never   Vaping Use   • Vaping Use: Never used   Substance Use Topics   • Alcohol use: Not Currently     Comment: quit   • Drug use: Not Currently     Types: Marijuana     Comment: has medical card        Review of Systems   Unable to perform ROS: Mental status change       Physical Exam  ED Triage Vitals   Temperature Pulse Respirations Blood Pressure SpO2   08/10/23 1533 08/10/23 1453 08/10/23 1453 08/10/23 1453 08/10/23 1453   (!) 96.2 °F (35.7 °C) 62 (!) 28 133/61 92 %      Temp Source Heart Rate Source Patient Position - Orthostatic VS BP Location FiO2 (%)   08/10/23 1533 08/10/23 1453 08/10/23 1453 08/10/23 1453 --   Axillary Monitor Sitting Right arm       Pain Score       --                    Orthostatic Vital Signs  Vitals:    08/10/23 1900 08/10/23 1945 08/10/23 2000 08/10/23 2045   BP: 130/78 115/70 111/58 94/55   Pulse: (!) 118 (!) 112 100 (!) 115   Patient Position - Orthostatic VS: Sitting Sitting Sitting Sitting       Physical Exam  Vitals and nursing note reviewed. Constitutional:       General: She is awake. She is in acute distress. Appearance: She is cachectic. She is ill-appearing (Chronically). Interventions: Nasal cannula in place. HENT:      Head: Normocephalic and atraumatic. Mouth/Throat:      Mouth: Mucous membranes are moist.      Comments: Mouth open, secondary to sequelae of stroke  Cardiovascular:      Rate and Rhythm: Normal rate and regular rhythm. Pulmonary:      Effort: Tachypnea present. Breath sounds: Rhonchi present. Abdominal:      Palpations: Abdomen is soft. Skin:     General: Skin is dry.       Comments: Extremities cool   Neurological:      Comments: Moving all extremities, exam limited secondary to mental status         ED Medications  Medications   LORazepam (ATIVAN) injection 0.5 mg (0.5 mg Intravenous Not Given 8/10/23 1701)   dextrose 50 % IV solution 50 mL (50 mL Intravenous Given 8/10/23 1513)   albuterol inhalation solution 10 mg (10 mg Nebulization Given 8/10/23 1558)     And   ipratropium (ATROVENT) 0.02 % inhalation solution 1 mg (1 mg Nebulization Given 8/10/23 1558)     And   sodium chloride 0.9 % inhalation solution 3 mL (3 mL Nebulization Given 8/10/23 1559)   sodium chloride 0.9 % bolus 250 mL (0 mL Intravenous Stopped 8/10/23 1801)   levofloxacin (LEVAQUIN) IVPB (premix in dextrose) 750 mg 150 mL (0 mg Intravenous Stopped 8/10/23 2108)   morphine injection 2 mg (2 mg Intravenous Given 8/10/23 1810)   ketorolac (TORADOL) injection 15 mg (15 mg Intravenous Given 8/10/23 1941)   furosemide (LASIX) injection 40 mg (40 mg Intravenous Given 8/10/23 1948)       Diagnostic Studies  Results Reviewed     Procedure Component Value Units Date/Time    HS Troponin 0hr (reflex protocol) [[id number]] Collected: 08/10/23 2124    Lab Status: In process Specimen: Blood from Arm, Right Updated: 08/10/23 2127    Blood culture #1 [[id number]] Collected: 08/10/23 1503    Lab Status: Preliminary result Specimen: Blood from Arm, Right Updated: 08/10/23 2101     Blood Culture Received in Microbiology Lab. Culture in Progress. Blood culture #2 [[id number]] Collected: 08/10/23 1504    Lab Status: Preliminary result Specimen: Blood from Arm, Left Updated: 08/10/23 2101     Blood Culture Received in Microbiology Lab. Culture in Progress.     B-Type Natriuretic Peptide(BNP) [[id number]]  (Abnormal) Collected: 08/10/23 1718    Lab Status: Final result Specimen: Blood from Arm, Right Updated: 08/10/23 1825     BNP >4,700 pg/mL     Lactic acid 2 Hours [[id number]]  (Normal) Collected: 08/10/23 1710    Lab Status: Final result Specimen: Blood from Arm, Right Updated: 08/10/23 1749     LACTIC ACID 2.0 mmol/L     Narrative:      Result may be elevated if tourniquet was used during collection.     Comprehensive metabolic panel [[id number]]  (Abnormal) Collected: 08/10/23 1558    Lab Status: Final result Specimen: Blood from Arm, Right Updated: 08/10/23 1638     Sodium 141 mmol/L      Potassium 3.7 mmol/L      Chloride 110 mmol/L      CO2 23 mmol/L      ANION GAP 8 mmol/L      BUN 13 mg/dL      Creatinine 0.91 mg/dL      Glucose 210 mg/dL      Calcium 7.9 mg/dL      Corrected Calcium 8.9 mg/dL      AST 23 U/L      ALT 15 U/L      Alkaline Phosphatase 65 U/L      Total Protein 5.4 g/dL      Albumin 2.8 g/dL      Total Bilirubin 0.63 mg/dL      eGFR 64 ml/min/1.73sq m     Narrative:      [facility] guidelines for Chronic Kidney Disease (CKD):   •  Stage 1 with normal or high GFR (GFR > 90 mL/min/1.73 square meters)  •  Stage 2 Mild CKD (GFR = 60-89 mL/min/1.73 square meters)  •  Stage 3A Moderate CKD (GFR = 45-59 mL/min/1.73 square meters)  •  Stage 3B Moderate CKD (GFR = 30-44 mL/min/1.73 square meters)  •  Stage 4 Severe CKD (GFR = 15-29 mL/min/1.73 square meters)  •  Stage 5 End Stage CKD (GFR <15 mL/min/1.73 square meters)  Note: GFR calculation is accurate only with a steady state creatinine    Urine Microscopic [[id number]]  (Abnormal) Collected: 08/10/23 1607    Lab Status: Final result Specimen: Urine, Indwelling Ugalde Catheter Updated: 08/10/23 1635     RBC, UA 1-2 /hpf      WBC, UA 1-2 /hpf      Epithelial Cells Occasional /hpf      Bacteria, UA Occasional /hpf      MUCUS THREADS Occasional     Hyaline Casts, UA 10-25 /lpf      Amorphous Crystals, UA Occasional    UA w Reflex to Microscopic w Reflex to Culture [[id number]]  (Abnormal) Collected: 08/10/23 1607    Lab Status: Final result Specimen: Urine, Indwelling Ugalde Catheter Updated: 08/10/23 1626     Color, UA Yellow     Clarity, UA Turbid     Specific Gravity, UA 1.022     pH, UA 6.5     Leukocytes, UA Negative     Nitrite, UA Negative     Protein,  (2+) mg/dl      Glucose,  (3/20%) mg/dl      Ketones, UA Trace mg/dl      Urobilinogen, UA 2.0 mg/dl      Bilirubin, UA Negative     Occult Blood, UA Negative    Lactic acid [[id number]]  (Abnormal) Collected: 08/10/23 1503    Lab Status: Final result Specimen: Blood from Hand, Right Updated: 08/10/23 1623     LACTIC ACID 2.7 mmol/L     Narrative:      Result may be elevated if tourniquet was used during collection.     Procalcitonin [[id number]]  (Normal) Collected: 08/10/23 1503    Lab Status: Final result Specimen: Blood from Arm, Right Updated: 08/10/23 1554     Procalcitonin 0.07 ng/ml     Protime-INR [[id number]]  (Abnormal) Collected: 08/10/23 1503    Lab Status: Final result Specimen: Blood from Arm, Right Updated: 08/10/23 1551     Protime 16.5 seconds      INR 1.34    APTT [[id number]]  (Normal) Collected: 08/10/23 1503    Lab Status: Final result Specimen: Blood from Arm, Right Updated: 08/10/23 1551     PTT 32 seconds     Blood gas, Venous [[id number]]  (Abnormal) Collected: 08/10/23 1503    Lab Status: Final result Specimen: Blood from Arm, Right Updated: 08/10/23 1531     pH, Cedrick 7.345     pCO2, Cedrick 40.3 mm Hg      pO2, Cedrick 44.4 mm Hg      HCO3, Cedrick 21.5 mmol/L      Base Excess, Cedrick -3.9 mmol/L      O2 Content, Cedrick 12.2 ml/dL      O2 HGB, VENOUS 71.1 %     CBC and differential [[id number]]  (Abnormal) Collected: 08/10/23 1503    Lab Status: Final result Specimen: Blood from Hand, Right Updated: 08/10/23 1526     WBC 10.33 Thousand/uL      RBC 3.71 Million/uL      Hemoglobin 11.9 g/dL      Hematocrit 39.2 %       fL      MCH 32.1 pg      MCHC 30.4 g/dL      RDW 18.7 %      MPV 10.3 fL      Platelets 149 Thousands/uL      nRBC 0 /100 WBCs      Neutrophils Relative 72 %      Immat GRANS % 1 %      Lymphocytes Relative 17 %      Monocytes Relative 9 %      Eosinophils Relative 0 %      Basophils Relative 1 %      Neutrophils Absolute 7.51 Thousands/µL      Immature Grans Absolute 0.09 Thousand/uL      Lymphocytes Absolute 1.74 Thousands/µL      Monocytes Absolute 0.94 Thousand/µL      Eosinophils Absolute 0.00 Thousand/µL      Basophils Absolute 0.05 Thousands/µL     Fingerstick Glucose (POCT) [[id number]]  (Abnormal) Collected: 08/10/23 1504    Lab Status: Final result Updated: 08/10/23 1504     POC Glucose 58 mg/dl                  CT head without contrast   Final Result by Allison Pacheco MD (08/10 2009)   Suboptimal positioning. No acute intracranial abnormality. Workstation performed: [id number]         CT chest abdomen pelvis wo contrast   Final Result by Allison Pacheco MD (08/10 2028)         Images are motion affected; the right lower lobe main bronchus appears occluded (2:48). The right lower lung remains aerated. Bibasilar nodular infiltrates are noted. . Recommend pulmonary consultation and follow-up to resolution. Small to moderate left pleural effusion. There appears to be a split pleura sign however evaluation is limited due to the lack of IV contrast. Recommend clinical correlation. .      The study was marked in EPIC for immediate notification.          Workstation performed: [id number]         XR chest portable   ED Interpretation by Edgardo Boast, DO (08/10 1809)   Wet read - low clavicles, poor inspiratory effort, no effusions PNA or PTX            Procedures  ECG 12 Lead Documentation Only    Date/Time: 8/10/2023 9:29 PM    Performed by: Edgardo Boast, DO  Authorized by: Edgardo Boast, DO    Indications / Diagnosis:  Troponin draw - repeat ekg  ECG reviewed by me, the ED Provider: yes    Patient location:  ED  Previous ECG:     Previous ECG:  Compared to current    Similarity:  No change  Interpretation:     Interpretation: normal    Rate:     ECG rate:  111    ECG rate assessment: tachycardic    Rhythm:     Rhythm: sinus tachycardia    Ectopy:     Ectopy: none    QRS:     QRS axis:  Normal    QRS intervals:  Normal  Conduction: Conduction: normal    ST segments:     ST segments:  Normal  T waves:     T waves: normal            ED Course  ED Course as of 08/10/23 2134   Thu Aug 10, 2023   1529 POC Glucose(!): 58  S/p D50   1529 WBC(!): 10.33   1529 Blood Pressure: 133/61   1529 Respirations(!): 26   1529 SpO2: 100 %   1539 Pt able to communicate by writing, c/o SOB (hx asthma), abd pain and n/v. PNA vs pneumonitis vs intraabdominal catastrophe   1541 Temperature(!): 96.2 °F (35.7 °C)   1541 Temp Source: Axillary  Will order temp cath lopes for accurate temp and UA   1545 pH, Cedrick: 7.345   1546 pCO2, Cedrick(!): 40.3   1620 Temperature(!): 96.8 °F (36 °C)   1620 Temp Source: Probe   1625 LACTIC ACID(!!): 2.7  Sepsis alert called    1632 Blood Pressure: 126/60   1632 Temperature: 98.1 °F (36.7 °C)   1632 Temp Source: Probe   1632 The 30ml/kg fluid bolus was not given to the patient despite hypotension and/or significantly elevated lactate of = 4 and/or presence of septic shock due to: Heart Failure. The patient will be administered 250cc of crystalloid fluid for now instead. Orders for this have been placed in Epic. The patient may receive additional colloid or crystalloid fluids thereafter based on clinical condition. Veryl Castellanos, DO     1638 CO2: 23   1639 Sodium: 141   1639 Potassium: 3.7   1639 BUN: 13   1639 Creatinine: 0.91   1639 TOTAL BILIRUBIN: 0.63   1639 AST: 23   1639 ALT: 15   1753 LACTIC ACID: 2.0   1841 BNP(!): >4,700  No prior - CHF exacerbation? Baseline? 1923 Called to bedside by nurse - pt with increased work of breathing, unable to maintain sats on nonrebreather - switched to midflow. Pt with coarse rhonchi and transmitted breath sounds throughout   1924 Toradol ordered for temp 100.4   1947 D/w pt's daughter regarding pt's code status given pt's increased work of breathing, possibility that pt may require intubation.  She is unsure what the pt's wishes would be and is going to discuss with her brother and call back 1948 Pt on BIPAP   2042 Crit care evaluated pt at bedside - she is improved on BIPAP, believe that pt is appropriate for admission to medical floor. Will reach out to [facility] for admission    2109 Reached out to ED [facility] for admission                          Initial Sepsis Screening     [address] Name 08/10/23 1637                Is the patient's history suggestive of a new or worsening infection? Yes (Proceed)  -MB        Suspected source of infection pneumonia  -MB        Indicate SIRS criteria Tachypnea > 20 resp per min; Hyperthemia > 38.3C (100.9F) OR Hypothermia <36C (96.8F)  -MB        Are two or more of the above signs & symptoms of infection both present and new to the patient? Yes (Proceed)  -MB        Assess for evidence of organ dysfunction: Are any of the below criteria present within 6 hours of suspected infection and SIRS criteria that are NOT considered to be chronic conditions? Lactate > 2. 0;INR > 1.5 or aPTT > 60 secs  -MB        Date of presentation of severe sepsis 08/10/23  -MB        Time of presentation of severe sepsis 1638  -MB        Sepsis Note: Click "NEXT" below (NOT "close") to generate sepsis note based on above information. YES (proceed by clicking "NEXT")  -MB              User Key  (r) = Recorded By, (t) = Taken By, (c) = Cosigned By    [facility] Name Provider Type    MANASA Walsh DO Resident              Default Flowsheet Data (last 720 hours)     Sepsis Reassess     Row Name 08/10/23 1802                   Repeat Volume Status and Tissue Perfusion Assessment Performed    Date of Reassessment: 08/10/23  -MB        Time of Reassessment: 1803  -MB        Sepsis Reassessment Note: Click "NEXT" below (NOT "close") to generate sepsis reassessment note.  YES (proceed by clicking "NEXT")  -MB        Repeat Volume Status and Tissue Perfusion Assessment Performed --              User Key  (r) = Recorded By, (t) = Taken By, (c) = Cosigned By    [facility] Name Provider Type    MANASA Walsh DO Resident                        Medical Decision Making  Is a 62-year-old female presenting for evaluation of shortness of breath and altered mental status    Differential: CVA versus pneumonia versus asthma versus CHF versus intra-abdominal catastrophe versus UTI    Plan: Septic labs CT head CT chest abdomen pelvis supplemental oxygen monitor and reassess. Likely admission    Please see ED course regarding specific events of patient's treatment    Patient admitted to medicine in stable condition with severe illness    Amount and/or Complexity of Data Reviewed  Labs: ordered. Decision-making details documented in ED Course. Radiology: ordered and independent interpretation performed. Risk  Prescription drug management. Decision regarding hospitalization. Disposition  Final diagnoses:   Encephalopathy   Sepsis ([address])   Respiratory distress   History of CVA (cerebrovascular accident)   Hypertension     Time reflects when diagnosis was documented in both MDM as applicable and the Disposition within this note     Time User Action Codes Description Comment    8/10/2023  9:31 PM Jeff Babe Add [G93.40] Encephalopathy     8/10/2023  9:32 PM Jeff Babe Add [A41.9] Sepsis ([address])     8/10/2023  9:32 PM Jeff Babe Add [R06.03] Respiratory distress     8/10/2023  9:33 PM Jeff Babe Add [Z86.73] History of CVA (cerebrovascular accident)     8/10/2023  9:33 PM Jeff Babe Add [I10] Hypertension       ED Disposition     ED Disposition   Admit    Condition   Stable    Date/Time   Thu Aug 10, 2023  9:33 PM    Comment   Case was discussed with ED SLIM and the patient's admission status was agreed to be Admission Status: inpatient status to the service of Dr. Nemo Patel . Follow-up Information    None         Patient's Medications   Discharge Prescriptions    No medications on file     No discharge procedures on file.     PDMP Review       Value Time User    PDMP Reviewed  Yes 7/28/2023  1:13 PM Max Solorzano MD           ED Provider  Attending physically available and evaluated Reese De Dios. I managed the patient along with the ED Attending.     Electronically Signed by         Kiera Pisano DO  08/11/23 9848

## 2023-08-10 NOTE — CASE COMMUNICATION
Arrived  for  pre scheduled  FATUMA  visit. Upon  arrival  patient  reports  increased  nausea  and  is  declining  visit  for  today. Informed  patient  her  certification  peroid  ends  on  8/10  and  if  a  visit  is  not  made  by  that  date  I  would  have  to  discharge  her  episode. Patient  is  agreeable  to a   visit  on  8/10.   T.C.  to  [facility]  and  informed  of  above  and  also  to  change  FATUMA  date  to  8/10

## 2023-08-11 PROBLEM — E16.2 HYPOGLYCEMIA: Status: ACTIVE | Noted: 2023-01-01

## 2023-08-11 PROBLEM — I27.21 PULMONARY ARTERY HYPERTENSION (HCC): Status: RESOLVED | Noted: 2023-08-10 | Resolved: 2023-08-11

## 2023-08-11 PROBLEM — E87.8 ELECTROLYTE IMBALANCE: Status: ACTIVE | Noted: 2023-08-11

## 2023-08-11 NOTE — ASSESSMENT & PLAN NOTE
· Hypomagnesemia secondary to poor oral intake  · Most recent magnesium of 1.4    Plan:  · Magnesium sulfate 2 g IVPB once  · Recheck magnesium in a.m. draw

## 2023-08-11 NOTE — CASE COMMUNICATION
Repeated  attempts  made  to  schedule  FATUMA  visit. Patient  finally  agreeable  to  visit  today. Upone  arrival  at  residence  son  informed  me  that  patient  has  a  FMD  appointment  today  and  MD  had  patient  sent  to  ED  for  evaluation  secondary  to  increased  lethargy. Unable  to  complete  FATUMA  visit. Also  since  today  was  last  day  of  present  cert  peroid  patient  will  be  discharged  from [facility]  services  a t  this  time.   FMD  informed  of  same

## 2023-08-11 NOTE — ASSESSMENT & PLAN NOTE
-pt initially with lactate 2.7 now 2.0  -bc x 2 sent in ER  -levoquin started. Pt with multiple allergies  -recent admission to hospital and arc  -infiltrates noted on ct chest  -pt not given full ivf 2/2 HF.  Pt was given 250 bolus and lasix in ER  -HD stable at this time

## 2023-08-11 NOTE — ASSESSMENT & PLAN NOTE
· BMP on 8/11 shows K of 3.1  · Hypomagnesimia with Mg of 1.4  · Most likely due to poor oral intake  · Subjectively, the patient feels fine and at baseline    Plan:  · Potassium chloride 40mEq IVPB  · Magnesium sulfate 2 g IVPB once  · Recheck magnesium in a.m. draw  · Monitor BMP  q12hr

## 2023-08-11 NOTE — SPEECH THERAPY NOTE
Speech Language/Pathology  Speech-Language Pathology Bedside Swallow Evaluation        Patient Name: Olamide Kennedy    ZWWWW'C Date: 8/11/2023     Problem List  Principal Problem:    Acute respiratory failure with hypoxia ([address])  Active Problems:    Rheumatoid arthritis of multiple sites with negative rheumatoid factor (HCC)    History of stroke    Altered mental status    Electrolyte imbalance    Hypoglycemia         Past Medical History  Past Medical History:   Diagnosis Date   • Anxiety    • Asthma    • Bipolar depression ([address])    • Chronic narcotic dependence ([address])    • Chronic pain    • Depression    • Enterovirus heart infection    • SHAR (obstructive sleep apnea)    • Osteoarthritis    • Peripheral neuropathy    • Plantar fasciitis of right foot    • Senile osteoporosis    • Seronegative arthropathy of multiple sites ([address])    • Undifferentiated connective tissue disease ([address])        Past Surgical History  Past Surgical History:   Procedure Laterality Date   • BREAST IMPLANT     • FIXATION KYPHOPLASTY LUMBAR SPINE     • HYSTERECTOMY     • INCONTINENCE SURGERY N/A    • IR STROKE ALERT  2/12/2023   • NASAL SEPTOPLASTY W/ TURBINOPLASTY N/A    • RECTAL PROLAPSE REPAIR N/A    • REPAIR RECTOCELE     • RHINOPLASTY N/A        Summary    Pt presents with oral phase of the swallow that appears [facility] for pureed and thick liquids, and suspected mod-severe pharyngeal dysphagia. Pt has a hx of silent aspiration on previous VBS. There were intermittent s/s of possible aspiration with NTL and HTL via cup, but no s/s via teaspoon. No s/s of aspiration with pureed or single ice chips. Noted audible and clunky sounding swallows with thick liquids via cup, but this was not observed with thick liquids via teaspoon. There was occasional esophageal gurgling and belching; ?elevated risk for retrograde aspiration in addition to anterograde aspiration. Also noted on VBS some esophageal dysfunction and tortuous esophagus.  At this time pt is appears appropriate to begin a conservative diet, while closely following recommendation and strategies below. If pt has any further respiratory decline or s/s of aspiration, recommend making pt strict NPO and contact ST. Pt may also benefit from GI consult. Recommendations:   Diet: puree/level 1 diet and nectar thick liquids **NTL by teaspoon only, NO cup, NO straw. Meds: crushed with puree   Frequent Oral care: 4x/day  Close supervision  Aspiration precautions and compensatory swallowing strategies: upright posture, only feed when fully alert, slow rate of feeding, small bites/sips and alternating bites and sips  Other Recommendations/ considerations: ST to see for dysphagia tx. VBS to be completed in the next 24-72 hours. If there is any further respiratory decline or s/s of aspiration, recommend making pt NPO until ST can follow up. Current Medical Status  Copied from admission/physician notes:  Ace Guaman is a 70 y.o. who presents with hx of stroke s/p L mca thrombectomy in 2/23, closed t12 fx with LSO brace, chronic t9,t11 fxs, kyphosis, bipolar, osteoporosis, dara, asthma, gerd, chf with ef 30% and mod AR, s/p fall on 6/30 and arc admission on 7/7/23 who was brought to ER by ems for worsening MS and hypoxia. Pt stated that she wears 2L O2 at home. Pt was initially put on NC and then midflow and transitioned to bipap in ER. Pt was awake, alert and writing. Pt treated for sepsis and given ativan and pt progressively got more tired t/o night. Pt admitted to sd1 for bipap monitoring. Acute respiratory failure with hypoxia [facility])  Assessment & Plan  • Patient was brought to the ED after being in altered mental status, requiring more oxygen. • History of multiple episodes of vomiting at night prior. • At the time of presentation in the ED, patient's blood glucose 58  • Historically patient has poor appetite and barely eats.   • High possibility of aspiration pneumonitis vs mechanical limitation due to LSO brace as the cause  • CT shows the right lower lobe main bronchus appears occluded (2:48). The right lower lung remains aerated. Bibasilar nodular infiltrates are noted  • At the time of examination, patient is alert, oriented, frail, has poor speech. • Cardiopulmonary examination limited by brace, but otherwise unremarkable. Plan:  • Monitor the patient for oxygen demands  • Discontinue levofloxacin  • Albuterol nebulization as needed       Order received and chart reviewed. Discussed with nurse. Pt drank sips of ginger ale earlier, followed by gurgling. Pt was on bipap earlier today, now on NC, sats in mid to upper 80s. Pt is currently alert, asking for food and drink. Pt states that she eats a pureed diet and NTL at home, but does drink thin water between meals per water protocol. She reports good oral care. Pt is familiar to department from previous stays in acute care. Pt has also been seen by ST in [facility]. She was discharged from [facility] on 7/29/23 on a pureed diet and NTL via cup. Most recent VBS at [facility] was on 3/9/23:  Assessment Summary:    Pt presents with moderate-severe oropharyngeal dysphagia where pt demonstrated weakened lip seal noted to be mildly decreased around tsp, leading to inconsistent trace anterior loss on initial tsp presentations but likely due to increased xerostomia which was observed. Bolus control of thin and NT liquids by tsp, cup and straw was observed to be decreased, leading to premature spill to pharynx prior to swallow. Bolus manipulation is delayed given puree noting spill to pharynx prior to swallow. Mastication was PROLONGED with soft solids (both banana and peaches) administered today. Bolus formation given soft solids was observed to be decreased to where despite prolonged mastication given these textures overall effectiveness in breakdown/formulation was decreased noting that pt did swallow banana >peaches more so whole.  Pt did have an episode of brief nasal regurgitation x1 w/ NT by tsp. Transition between with oral and pharyngeal stages was moderately delayed w/ puree, NT and thin textures administered. Hyolaryngeal excursion was decreased given all consistencies. It was observed that BOT retractions is weaker which it was observed that pt did have fairly consistent retention given puree and soft textures. Also observed during assessment was weakened/decreased pharyngeal constriction across textures which lead to moderate amount of pharyngeal retention (more vallecular vs pyriform sinus retention). Pt did also present w/ trace to minimal PPW w/ all consistencies observed. While pt's epiglottis was inverting during swallow, overall timing given airway protection during swallows more w/ NT and thins was decreased. Pt was observed w/ sluggish movement/return of epiglottis intermittently during assessment. It was observed that pt did have a cricopharyngeal prominence around to C5-C6 level which did NOT impinge passage of boluses. Unfortunately, pt did have increased laryngeal penetration which ranged from high level but then deep to the level of the vocal cords as well as then mostly silent aspiration events w/ thin liquids but also NT by straw. Refer to below for observations for full report for specific event w/ penetration and aspiration. Esophageal stage:  Esophageal screening was completed. Pt was observed to have slower motility given puree, soft solids noted in upper esophagus. However, at end of assessment, esophageal scan was completed to where it was observed that pt exhibited retropulsion of food stuff at the mid-esophagus level and minimal clearance over time of this food which remained in mid to lower esophagus. SLP had pt use NT wash which did push held up food in mid-lower esophagus through LES. Esophagus does appear to be tortuous as well which can impact clearance of food/liquid.     Note: Images are available for review in PACS as desired. Recommendations:   Recommended Diet:  puree/level 1 diet and nectar thick liquids by CUP ONLY-NO STRAWS  Recommended Form of Medications: whole with puree   Aspiration precautions and compensatory swallowing strategies: upright posture, only feed when fully alert, slow rate of feeding, small bites/sips, no straws, effortful swallow, cough every 4-5 bites/sips, quiet environment (tv off, limit talking, door closed, etc.), alternating bites and sips, OOB for ALL meals, MUST be AWAKE/ALERT for meals. Past medical history:   Please see H&P for details      Special Studies:  CT head-  Suboptimal positioning. No acute intracranial abnormality. CXR-  Minimal bibasilar subsegmental atelectasis. Small left pleural effusion. CT chest-  IMPRESSION:  Images are motion affected; the right lower lobe main bronchus appears occluded (2:48). The right lower lung remains aerated. Bibasilar nodular infiltrates are noted. . Recommend pulmonary consultation and follow-up to resolution. Small to moderate left pleural effusion. There appears to be a split pleura sign however evaluation is limited due to the lack of IV contrast. Recommend clinical correlation. .            Social/Education/Vocational Hx:  Pt lives with family    Swallow Information   Current Risks for Dysphagia & Aspiration: CVA, known history of dysphagia, known history of aspiration, dysarthria and reduced vocal intensity/volume  Current Symptoms/Concerns: change in respiratory status  Current Diet: NPO   Baseline Diet: puree/level 1 diet, nectar thick liquids and thin liquids between meals    Baseline Assessment   Behavior/Cognition: alert  Speech/Language Status: able to participate in basic conversation and able to follow commands  Patient Positioning: upright in chair     Swallow Mechanism Exam   Facial: left facial droop  Labial: decreased ROM left side, decreased strength and decreased coordination  Lingual: decreased ROM, bilateral decreased strength and decreased coordination  Velum: unable to visualize  Mandible:  decreased ROM  Dentition: adequate  Vocal quality:weak, dysphonic and somewhat breathy   Volitional Cough: somewhat weak   Respiratory: NC, sats in upper 80s off bipap      Consistencies Assessed and Performance   Consistencies Administered: ice chips, nectar thick, honey thick and puree  -liquids assessed via spoon and cup only    Oral Stage: Adequate bolus retrieval and good lip seal around cup. Prompt bolus manipulation and transfer. No pocketing or residue, adequate lip seal.     Pharyngeal Stage: Hyolaryngeal elevation observed and palpated. Suspect reduced movement/elevation. Swallows appeared fairly prompt. There was slightly wet vocal quality after HTL via cup, no s/s with HTL via teaspoon. There was cough on 1 of 3 sips of NTL via cup, but no overt s/s with NTL via teaspoon. No s/s of aspiration with pureed or ice chips one at a time. Swallows via cup were audible and clunky sounding. Esophageal Concerns: belching and esophageal gurgling intermittently      Results Reviewed with: patient, RN and aspiration precautions posted   Dysphagia Goals: pt will participate in VBS and 1. Pt will tolerate pureed diet and NTL by teaspoon without s/s of aspiration. 2. Pt will use compensatory strategies with min cues across all PO. 3. Pt will tolerate LRD without s/s of aspiration.    Discharge recommendation: ?home health    Speech Therapy Prognosis   Prognosis: fair    Prognosis Considerations: age, medical status, prior medical history, respiratory status and therapeutic potential

## 2023-08-11 NOTE — H&P
[facility]  H&P  Name: Wendi Gifford 70 y.o. female I MRN: [id number]  Unit/Bed#: ED-14 I Date of Admission: 8/10/2023   Date of Service: 8/11/2023 I Hospital Day: 1      Assessment/Plan   * Acute respiratory failure with hypoxia [facility])  Assessment & Plan  Brought to ER for increased wob and lethargy. Pt placed on NC then midflow. Pt chronically keeps mouth open and so pt was placed on bipap with improvement.  -ct chest shows RLL main bronchus appears occluded. RLL remains aerated and she has a sm/mod L pleural effusion.   -Initially pt awake, talking and writing while on bipap with sats mid 90's. Pt initially was SD2 and as night progressed and pt became sleepy after ativan and there was concern for possible worsening without reserve and nursing comfort so pt made SD1  -cont bipap and transition to HFNC prn  -cont abx for now  -discussion in am for need for bronch with ct findings  -chest pt as tolerated      Sepsis ([address])  Assessment & Plan  -pt initially with lactate 2.7 now 2.0  -bc x 2 sent in ER  -levoquin started. Pt with multiple allergies  -recent admission to hospital and arc  -infiltrates noted on ct chest  -pt not given full ivf 2/2 HF. Pt was given 250 bolus and lasix in ER  -HD stable at this time    History of stroke  Assessment & Plan  -feb 2023 pt with right MCA thrombectomy with residual L weakness  -cont eloquis for now  -cont neuro checks      Rheumatoid arthritis of multiple sites with negative rheumatoid factor (HCC)  Assessment & Plan  -cont plaquenil  -monitor  -follows with rheumatology    Bipolar depression ([address])  Assessment & Plan  -cont home meds as able  -hold ativan      Mild intermittent asthma without complication  Assessment & Plan  -scheduled nebs  -reports being on 2L home O2.            History of Present Illness     HPI: Wendi Gifford is a 70 y.o. who presents with hx of stroke s/p L mca thrombectomy in 2/23, closed t12 fx with LSO brace, chronic t9,t11 fxs, kyphosis, bipolar, osteoporosis, dara, asthma, gerd, chf with ef 30% and mod AR, s/p fall on 6/30 and arc admission on 7/7/23 who was brought to ER by ems for worsening MS and hypoxia. Pt stated that she wears 2L O2 at home. Pt was initially put on NC and then midflow and transitioned to bipap in ER. Pt was awake, alert and writing. Pt treated for sepsis and given ativan and pt progressively got more tired t/o night. Pt admitted to [facility] for bipap monitoring. History obtained from chart review and the patient. Review of Systems   Unable to perform ROS: Mental status change   Respiratory: Positive for shortness of breath. Cardiovascular: Negative. Gastrointestinal: Negative. Genitourinary: Negative. Musculoskeletal: Negative. Neurological: Negative. Historical Information   Past Medical History:  No date:  Anxiety  No date: Asthma  No date: Bipolar depression ([address])  No date: Chronic narcotic dependence (HCC)  No date: Chronic pain  No date: Depression  No date: Enterovirus heart infection  No date: DARA (obstructive sleep apnea)  No date: Osteoarthritis  No date: Peripheral neuropathy  No date: Plantar fasciitis of right foot  No date: Senile osteoporosis  No date: Seronegative arthropathy of multiple sites ([address])  No date: Undifferentiated connective tissue disease ([address]) Past Surgical History:  No date: BREAST IMPLANT  No date: FIXATION KYPHOPLASTY LUMBAR SPINE  No date: HYSTERECTOMY  No date: INCONTINENCE SURGERY; N/A  2/12/2023: IR STROKE ALERT  No date: NASAL SEPTOPLASTY W/ TURBINOPLASTY; N/A  No date: RECTAL PROLAPSE REPAIR; N/A  No date: REPAIR RECTOCELE  No date: RHINOPLASTY; N/A   Current Outpatient Medications   Medication Instructions   • acetaminophen (TYLENOL) 650 mg, Oral, Every 6 hours scheduled   • albuterol (PROVENTIL HFA,VENTOLIN HFA) 90 mcg/act inhaler 2 puffs, Inhalation, Every 6 hours PRN   • apixaban (ELIQUIS) 5 mg, Oral, 2 times daily   • buPROPion [facility]) 100 mg, Oral, 2 times daily   • cinacalcet (SENSIPAR) 30 mg, Oral, Daily with breakfast   • Diclofenac Sodium (VOLTAREN) 2 g, Topical, 4 times daily PRN, To neck/R shoulder.    • docusate sodium (COLACE) 100 mg, Oral, 2 times daily   • DULoxetine (CYMBALTA) 60 mg, Oral, 2 times daily   • gabapentin (NEURONTIN) 300 mg, Oral, 2 times daily   • hydroxychloroquine (PLAQUENIL) 200 mg, Oral, 2 times daily with meals   • lamoTRIgine (LAMICTAL) 150 mg, Oral, Daily at bedtime, pt taking 150mg daily at bedtime   • lidocaine (LIDODERM) 5 % 2 patches, Topical, Daily at bedtime, On for 12 hours, Off for 12 hours - To L outer hip and or back   • magnesium oxide (MAG-OX) 400 mg, Oral, 2 times daily   • melatonin 3 mg, Oral, Daily at bedtime   • metoprolol succinate (TOPROL-XL) 25 mg, Oral, Daily   • morphine (MSIR) 7.5 mg, Oral, 2 times daily PRN   • pantoprazole (PROTONIX) 40 mg, Oral, Daily (early morning)   • senna (SENOKOT) 8.6 mg, Oral, 2 times daily    Allergies   Allergen Reactions   • Cephalosporins Other (See Comments)     rash   • Iodine - Food Allergy Anaphylaxis     Anaphylaxis     • Methocarbamol Hives   • Sulfa Antibiotics Hives   • Bactrim [Sulfamethoxazole-Trimethoprim] Hives   • Erythromycin Hives   • Penicillins    • Shrimp Extract Allergy Skin Test - Food Allergy Other (See Comments)   • Trimethoprim Hives      Social History     Tobacco Use   • Smoking status: Former   • Smokeless tobacco: Never   Vaping Use   • Vaping Use: Never used   Substance Use Topics   • Alcohol use: Not Currently     Comment: quit   • Drug use: Not Currently     Types: Marijuana     Comment: has medical card    Family History   Problem Relation Age of Onset   • No Known Problems Mother    • No Known Problems Father    • Arthritis Family    • Diabetes Family    • Stroke Family           Objective                            Vitals I/O      Most Recent Min/Max in 24hrs   Temp 98.6 °F (37 °C) Temp  Min: 96.2 °F (35.7 °C)  Max: 100.4 °F (38 °C)   Pulse (!) 110 Pulse  Min: 62  Max: 118   Resp (!) 39 Resp  Min: 26  Max: 42   /73 BP  Min: 94/55  Max: 133/61   O2 Sat 93 % SpO2  Min: 90 %  Max: 100 %      Intake/Output Summary (Last 24 hours) at 8/11/2023 0023  Last data filed at 8/10/2023 2108  Gross per 24 hour   Intake 400 ml   Output --   Net 400 ml         Diet NPO     Invasive Monitoring Physical exam     Physical Exam  Eyes:      Pupils: Pupils are equal, round, and reactive to light. Skin:     General: Skin is warm and dry. HENT:      Head: Normocephalic and atraumatic. Mouth/Throat:      Mouth: Mucous membranes are dry. Cardiovascular:      Rate and Rhythm: Tachycardia present. Pulses: Normal pulses. Abdominal:      Palpations: Abdomen is soft. Constitutional:       Appearance: She is ill-appearing. Pulmonary:      Effort: No accessory muscle usage, respiratory distress or accessory muscle usage. Breath sounds: No wheezing. Comments: Decreased b/l bases, on bipap, no distress  Secretions are normal.Neurological:      General: No focal deficit present. Mental Status: She is alert and oriented to person, place and time. Mental status is at baseline. Diagnostic Studies      EKG: sinus tach on tele  Imaging:  I have personally reviewed pertinent reports.    and I have personally reviewed pertinent films in PACS     Medications:  Scheduled PRN   chlorhexidine, 15 mL, Q12H [facility]  LORazepam, 0.5 mg, Once          Continuous          Labs:    CBC    Recent Labs     08/10/23  1503   WBC 10.33*   HGB 11.9   HCT 39.2        BMP    Recent Labs     08/10/23  1558   SODIUM 141   K 3.7   *   CO2 23   AGAP 8   BUN 13   CREATININE 0.91   CALCIUM 7.9*       Coags    Recent Labs     08/10/23  1503   INR 1.34*   PTT 32        Additional Electrolytes  No recent results       Blood Gas    No recent results  Recent Labs     08/10/23  1503   PHVEN 7.345   OPK2XAF 40.3*   PO2VEN 44.4   XDL3LUG 21.5*   BEVEN -3.9    LFTs  Recent Labs     08/10/23  1558   ALT 15   AST 23   ALKPHOS 65   ALB 2.8*   TBILI 0.63       Infectious  Recent Labs     08/10/23  1503   PROCALCITONI 0.07     Glucose  Recent Labs     08/10/23  1558   GLUC 210*               Anticipated Length of Stay is > 2 midnights  Effie Dominguez

## 2023-08-11 NOTE — ASSESSMENT & PLAN NOTE
· Patient presented to the ED with altered mental status  · At the time of admission in the ED, her blood sugar was 458 was given 50 mL of D50. · Poor oral intake  · Blood glucose level of 48 at 1220 on 8/11    Plan:  · Blood glucose checks every 6 hours  · Hypoglycemia protocol  · Will consider nasogastric feeding in case patient fails to feed adequately.

## 2023-08-11 NOTE — ASSESSMENT & PLAN NOTE
· Patient was drowsy, had altered mental status as per the daughter of the patient.   · At the time of presentation to the ED, blood glucose 58  · Hypoglycemia vs fatigue from sleep deprivation as possible causes   · CT shows No acute intracranial abnormality  · Patient is alert oriented, moves all limbs, but has residual weakness from right MCA stroke in February 2023      Plan:  · Continue monitoring fingerstick blood glucose every 6 hours  · Hypoglycemia protocol  · Frequent neurochecks

## 2023-08-11 NOTE — PROGRESS NOTES
[address] [facility]  Progress Note  Name: Reyes Honer  MRN: [id number]  Unit/Bed#: ICU 04 I Date of Admission: 8/10/2023   Date of Service: 8/11/2023 I Hospital Day: 1    Assessment/Plan   * Acute respiratory failure with hypoxia [facility])  Assessment & Plan  · Patient was brought to the ED after being in altered mental status, requiring more oxygen. · History of multiple episodes of vomiting at night prior. · At the time of presentation in the ED, patient's blood glucose 58  · Historically patient has poor appetite and barely eats. · High possibility of aspiration pneumonitis vs mechanical limitation due to LSO brace as the cause  · CT shows the right lower lobe main bronchus appears occluded (2:48). The right lower lung remains aerated. Bibasilar nodular infiltrates are noted  · At the time of examination, patient is alert, oriented, frail, has poor speech. · Cardiopulmonary examination limited by brace, but otherwise unremarkable. Plan:  · Monitor the patient for oxygen demands  · Discontinue levofloxacin  · Albuterol nebulization as needed        Hypoglycemia  Assessment & Plan  · Patient presented to the ED with altered mental status  · At the time of admission in the ED, her blood sugar was 458 was given 50 mL of D50. · Poor oral intake  · Blood glucose level of 48 at 1220 on 8/11    Plan:  · Blood glucose checks every 6 hours  · Hypoglycemia protocol  · Will consider nasogastric feeding in case patient fails to feed adequately.     Electrolyte imbalance  Assessment & Plan  · BMP on 8/11 shows K of 3.1  · Hypomagnesimia with Mg of 1.4  · Most likely due to poor oral intake  · Subjectively, the patient feels fine and at baseline    Plan:  · Potassium chloride 40mEq IVPB  · Magnesium sulfate 2 g IVPB once  · Recheck magnesium in a.m. draw  · Monitor BMP  q12hr     Closed T12 fracture [facility])  Assessment & Plan  Patient has a history of low impact fall that resulted in multiple T-spine fractures  Currently pain controlled by morphine    Plan:  Avoid Morphine for now  Pain control with IV toradol    Altered mental status  Assessment & Plan  · Patient was drowsy, had altered mental status as per the daughter of the patient. · At the time of presentation to the ED, blood glucose 58  · Hypoglycemia vs fatigue from sleep deprivation as possible causes   · CT shows No acute intracranial abnormality  · Patient is alert oriented, moves all limbs, but has residual weakness from right MCA stroke in February 2023      Plan:  · Continue monitoring fingerstick blood glucose every 6 hours  · Hypoglycemia protocol  · Frequent neurochecks    History of stroke  Assessment & Plan  · Patient had right MCA stroke, s/p mechanical thrombectomy 5/20/2023  · Although the patient moves all the limbs, there is residual weakness in the left upper and lower limb  · CT head on August 8 shows No acute intracranial abnormality    Plan:  · Continue Eliquis  · Frequent neurochecks      Rheumatoid arthritis of multiple sites with negative rheumatoid factor ([address])  Assessment & Plan  · Patient has a history of seronegative multiple joint arthropathy  · Currently on hydroxychloroquine 200 mg twice daily  · Patient follows up with rheumatologist    Plan:  · Continue hydroxychloroquine as per home regimen           ICU Core Measures     A: Assess, Prevent, and Manage Pain · Has pain been assessed? Yes  · Need for changes to pain regimen? No   B: Both SAT/SAT  · N/A   C: Choice of Sedation · RASS Goal: 0 Alert and Calm  · Need for changes to sedation or analgesia regimen? No   D: Delirium · CAM-ICU: Negative   E: Early Mobility  · Plan for early mobility? Yes   F: Family Engagement · Plan for family engagement today? Yes       Review of Invasive Devices:     Aftab Plan: Continue for accurate I/O monitoring for 48 hours        Prophylaxis:  VTE VTE covered by:  apixaban, Oral       Stress Ulcer  covered bypantoprazole (PROTONIX) EC tablet 40 mg [id number]     Subjective   HPI/24hr events: No overnight events, patient subjectively feels better than yesterday. Review of Systems   Constitutional: Positive for activity change (Reduced activity, dependent on walker for walking) and appetite change (Decreased). Negative for fever. HENT: Positive for dental problem and voice change. Negative for ear pain, rhinorrhea and sore throat. Eyes: Negative. Respiratory: Positive for shortness of breath. Negative for chest tightness and wheezing. Cardiovascular: Negative for chest pain and palpitations. Gastrointestinal: Negative for abdominal distention, constipation and diarrhea. Genitourinary:        Reduced urinary frequency, reduced urine production, incontinence   Musculoskeletal: Positive for back pain. Skin: Positive for color change (Bluish discoloration over bilateral feet). Allergic/Immunologic: Negative. Neurological: Positive for weakness. Negative for dizziness, light-headedness and headaches. Psychiatric/Behavioral: Negative. Objective                            Vitals I/O      Most Recent Min/Max in 24hrs   Temp 99 °F (37.2 °C) Temp  Min: 96.2 °F (35.7 °C)  Max: 100.4 °F (38 °C)   Pulse (!) 110 Pulse  Min: 62  Max: 118   Resp (!) 24 Resp  Min: 24  Max: 42   /61 BP  Min: 93/61  Max: 133/61   O2 Sat 91 % SpO2  Min: 86 %  Max: 100 %      Intake/Output Summary (Last 24 hours) at 8/11/2023 1251  Last data filed at 8/11/2023 0500  Gross per 24 hour   Intake 590 ml   Output 450 ml   Net 140 ml         Diet NPO     Invasive Monitoring Physical exam    Physical Exam  Constitutional:       General: Patient looks frail, poor built     Appearance: Normal appearance. HENT:      Head: Normocephalic. Nose: Nose normal.      Mouth/Throat: Dry mouth, dry pharynx, poor dentition, unable to close mouth completely      Eyes:      Pupils: Pupils are equal, round, and reactive to light.    Cardiovascular:      Rate and Rhythm: Normal rate and regular rhythm. Pulses: Normal pulses. Heart sounds: No murmur heard. Pulmonary:      Effort: Pulmonary effort is slightly reduced. No respiratory distress. Breath sounds: Bilateral lower limbs on examination was limited because of LSO brace. Abdominal:      General: Abdomen is flat. Bowel sounds are normal. There is no distension. Tenderness: There is no abdominal tenderness. Musculoskeletal:      Cervical back: Normal range of motion. Right lower leg: No edema. Left lower leg: No edema. Skin:     General: Skin is warm. Capillary Refill: Capillary refill takes less than 2 seconds. Neurological:      General: No focal deficit present. Mental Status: He is alert. Motor: Right lower extremity 4/5  Left lower extremity 3/5  Right upper extremity 4/5  Left upper extremity 3/5  Sensory:  Bilaterally equal in all 4 limbs     Comments:   Psychiatric:         Mood and Affect: Mood normal.      Diagnostic Studies      EKG:  On continuous telemetry, sinus rhythm, heart rate 94/min  Imaging: No pertinent images ordered overnight     Medications:  Scheduled PRN   acetaminophen, 650 mg, Q6H KHARI  apixaban, 5 mg, BID  buPROPion, 50 mg, BID  chlorhexidine, 15 mL, Q12H KHARI  cinacalcet, 30 mg, Daily With Breakfast  docusate sodium, 100 mg, BID  DULoxetine, 60 mg, BID  magnesium sulfate, 2 g, Once  metoprolol succinate, 25 mg, Daily  pantoprazole, 40 mg, Early Morning  senna, 8.6 mg, BID      albuterol, 2 puff, Q6H PRN       Continuous          Labs:    CBC    Recent Labs     08/10/23  1503 08/11/23  0508   WBC 10.33* 15.49*   HGB 11.9 8.6*   HCT 39.2 28.3*    246     BMP    Recent Labs     08/10/23  1558 08/11/23  0508   SODIUM 141 131*   K 3.7 3.1*   * 101   CO2 23 22   AGAP 8 8   BUN 13 12   CREATININE 0.91 0.94   CALCIUM 7.9* 6.8*       Coags    Recent Labs     08/10/23  1503   INR 1.34*   PTT 32        Additional Electrolytes  Recent Labs 08/11/23  0508   MG 1.4*   PHOS 3.0          Blood Gas    No recent results  Recent Labs     08/10/23  1503   PHVEN 7.345   XOR4KJR 40.3*   PO2VEN 44.4   OUY5DIO 21.5*   BEVEN -3.9    LFTs  Recent Labs     08/10/23  1558   ALT 15   AST 23   ALKPHOS 65   ALB 2.8*   TBILI 0.63       Infectious  Recent Labs     08/10/23  1503 08/11/23  0508   PROCALCITONI 0.07 0.08     Glucose  Recent Labs     08/10/23  1558 08/11/23  0508   GLUC 210* 551*                   Amairani Purcell MD

## 2023-08-11 NOTE — ASSESSMENT & PLAN NOTE
· Patient was brought to the ED after being in altered mental status, requiring more oxygen. · History of multiple episodes of vomiting at night prior. · At the time of presentation in the ED, patient's blood glucose 58  · Historically patient has poor appetite and barely eats. · High possibility of aspiration pneumonitis vs mechanical limitation due to LSO brace as the cause  · CT shows the right lower lobe main bronchus appears occluded (2:48). The right lower lung remains aerated. Bibasilar nodular infiltrates are noted  · At the time of examination, patient is alert, oriented, frail, has poor speech. · Cardiopulmonary examination limited by brace, but otherwise unremarkable.     Plan:  · Monitor the patient for oxygen demands  · Discontinue levofloxacin  · Albuterol nebulization as needed

## 2023-08-11 NOTE — ASSESSMENT & PLAN NOTE
· Patient had right MCA stroke, s/p mechanical thrombectomy 5/20/2023  · Although the patient moves all the limbs, there is residual weakness in the left upper and lower limb  · CT head on August 8 shows No acute intracranial abnormality    Plan:  · Continue Eliquis  · Frequent neurochecks

## 2023-08-11 NOTE — ASSESSMENT & PLAN NOTE
Patient has a history of low impact fall that resulted in multiple T-spine fractures  Currently pain controlled by morphine    Plan:  Avoid Morphine for now  Pain control with IV toradol

## 2023-08-11 NOTE — ASSESSMENT & PLAN NOTE
Brought to ER for increased wob and lethargy. Pt placed on NC then midflow. Pt chronically keeps mouth open and so pt was placed on bipap with improvement.  -ct chest shows RLL main bronchus appears occluded. RLL remains aerated and she has a sm/mod L pleural effusion.   -Initially pt awake, talking and writing while on bipap with sats mid 90's.  Pt initially was SD2 and as night progressed and pt became sleepy after ativan and there was concern for possible worsening without reserve and nursing comfort so pt made SD1  -cont bipap and transition to HFNC prn  -cont abx for now  -discussion in am for need for bronch with ct findings  -chest pt as tolerated

## 2023-08-11 NOTE — ASSESSMENT & PLAN NOTE
-feb 2023 pt with right MCA thrombectomy with residual L weakness  -cont eloquis for now  -cont neuro checks

## 2023-08-11 NOTE — ASSESSMENT & PLAN NOTE
· Patient has a history of seronegative multiple joint arthropathy  · Currently on hydroxychloroquine 200 mg twice daily  · Patient follows up with rheumatologist    Plan:  · Continue hydroxychloroquine as per home regimen

## 2023-08-11 NOTE — ASSESSMENT & PLAN NOTE
BMP on 8/11 shows K of 3.1  Subjectively, the patient feels fine and at baseline    Plan:  Potassium chloride 40mEq IVPB  Monitor BMP  q12hr

## 2023-08-11 NOTE — DISCHARGE INSTR - DIET
Modified (Video) Barium Swallow Study 8/13/23     Summary:  Images are on PACS for review. Pt was given a few ice chips prior to the study at her request.      Oral stage: Moderate. No anterior spill. Trailed solid. The pt spit it out. Reduced bolus formation and control. Spill to at least the valleculae. Lingual pump transfer. Intermittent piecemeal transfer. Weak transfer. Intermittent lingual residue, palatal/velar coating. Pharyngeal stage: Mod/*Severe-Severe. The pt's C spine was severely curved. The bolus spilled to the valleculae and over the epiglottis  prior to the swallow. Given the curvature, pharyngeal constriction was not consistent along the PPW. Inconsistent epiglottic inversion, Minimal hyoid movement. Reduced laryngeal elevation. Reduced tongue base retraction. Full vallecular retention, Intermittent mild PPW coating. Eventual transient penetration w/ puree, then trace penetration, then aspiration. No cough. Aspiration w/ tsps nectar thick, honey thick, and cup sip thin. Aspiration was prior to or during the swallow. Only cough response was on larger amt thin liquids. Per gross esophageal screen: The esophagus was full following puree presentations. Partial clearance w/ thick, however distal esophageal retropulsion was observed. Strategies: The pt had her neck slightly flexed to begin the study. She was cued for slight neck flexion. Chin down may have slightly reduced retention however the pt did not maintain. Cued to keep head at neutral. This also minimally reduced retention and improved hyoid excursion. Slight neck extension appeared to be the "natural" position for pt to swallow, as she kept resuming this. Cued cough did not clear the airway. Recommendations:  Pt is currently refusing a feeding tube. Risk of possible ongoing hospital admits, respiratory consequence, and weight loss/nutritional needs were d/w the pt, as well as QOL.  She would like to continue a diet at this time. Diet: puree as tolerated  Liquids: nectar (dislikes the honey thick even more). Ok ice chips. Meds: crush  Strategies: head forward/at mildine, swallow hard, swallow x 2, liquids by tsp  Frequent oral care  Upright position  F/u ST tx: ? Brief f/u  Therapy Prognosis: guarded  Prognosis considerations: ongoing dysphagia w/ aspiration  Close Supervision  Aspiration Precautions  Reflux Precautions  Consider consult with: palliative  Results reviewed with: pt, nursing, physician immediately following the study  Aspiration precautions posted. Repeat MBS as/if necessary  If a dedicated assessment of the esophagus is desired:Consider EGD        Goals:  Pt will tolerate least restrictive diet w/ minimal s/s aspiration or oral/pharyngeal difficulties. Pt will follow above listed strategies to reduce risk on 80% of swallows w/ min cues. Patient's goal:currently does not want a feeding tube     Pt is a 68yof referred for MBS. H/o silent aspiration. Had VBS at [address] [place] [address] 3/9/23 and vbs at [facility] 2021. H/o R MCA stroke. H&P/pertinent provider notes: (PMH noted above)  HPI: Vera Dave is a 70 y.o. who presents with hx of stroke s/p L mca thrombectomy in 2/23, closed t12 fx with LSO brace, chronic t9,t11 fxs, kyphosis, bipolar, osteoporosis, dara, asthma, gerd, chf with ef 30% and mod AR, s/p fall on 6/30 and arc admission on 7/7/23 who was brought to ER by ems for worsening MS and hypoxia. Pt stated that she wears 2L O2 at home. Pt was initially put on NC and then midflow and transitioned to bipap in ER. Pt was awake, alert and writing. Pt treated for sepsis and given ativan and pt progressively got more tired t/o night. Pt admitted to sd1 for bipap monitoring. Special Studies:  CT chest/abd/pelvis 8/10/23  Images are motion affected; the right lower lobe main bronchus appears occluded (2:48). The right lower lung remains aerated. Bibasilar nodular infiltrates are noted. . Recommend pulmonary consultation and follow-up to resolution. Small to moderate left pleural effusion. There appears to be a split pleura sign however evaluation is limited due to the lack of IV contrast. Recommend clinical correlation. .  CT C spine 8/3/23: (see complete study for other details)  ALIGNMENT: Alignment appears unchanged compared to the prior study. Exaggeration of cervical lordosis is similar to the prior study. Mild degenerative retrolisthesis of C3 upon C4 and mild degenerative anterolisthesis of C4 upon C5 appears unchanged. Mild degenerative anterolisthesis of T1 upon T2 unchanged. No jumped facets. CT head 8/10/23:  Suboptimal positioning. No acute intracranial abnormality. CT head 2/22/23:  1. New small acute /more recent interval lacunar infarction in the right cerebral peduncle (series 4, image 14). 2.  Extensive multifocal large, previously present right middle cerebral artery infarction which is evolving with superimposed areas of evolving hemorrhage likely related to hemorrhagic transformation of subacute right MCA infarction. No significant   mass effect. 3.  Mild, chronic microangiopathy. Previous MBS:  3/9/23 [facility]:  Assessment Summary:    Pt presents with moderate-severe oropharyngeal dysphagia where pt demonstrated weakened lip seal noted to be mildly decreased around tsp, leading to inconsistent trace anterior loss on initial tsp presentations but likely due to increased xerostomia which was observed. Bolus control of thin and NT liquids by tsp, cup and straw was observed to be decreased, leading to premature spill to pharynx prior to swallow. Bolus manipulation is delayed given puree noting spill to pharynx prior to swallow. Mastication was PROLONGED with soft solids (both banana and peaches) administered today.  Bolus formation given soft solids was observed to be decreased to where despite prolonged mastication given these textures overall effectiveness in breakdown/formulation was decreased noting that pt did swallow banana >peaches more so whole. Pt did have an episode of brief nasal regurgitation x1 w/ NT by tsp. Transition between with oral and pharyngeal stages was moderately delayed w/ puree, NT and thin textures administered. Hyolaryngeal excursion was decreased given all consistencies. It was observed that BOT retractions is weaker which it was observed that pt did have fairly consistent retention given puree and soft textures. Also observed during assessment was weakened/decreased pharyngeal constriction across textures which lead to moderate amount of pharyngeal retention (more vallecular vs pyriform sinus retention). Pt did also present w/ trace to minimal PPW w/ all consistencies observed. While pt's epiglottis was inverting during swallow, overall timing given airway protection during swallows more w/ NT and thins was decreased. Pt was observed w/ sluggish movement/return of epiglottis intermittently during assessment. It was observed that pt did have a cricopharyngeal prominence around to C5-C6 level which did NOT impinge passage of boluses. Unfortunately, pt did have increased laryngeal penetration which ranged from high level but then deep to the level of the vocal cords as well as then mostly silent aspiration events w/ thin liquids but also NT by straw. Refer to below for observations for full report for specific event w/ penetration and aspiration. Note: Images are available for review in PACS as desired.   Recommendations:   Recommended Diet:  puree/level 1 diet and nectar thick liquids by CUP ONLY-NO STRAWS  Recommended Form of Medications: whole with puree   Aspiration precautions and compensatory swallowing strategies: upright posture, only feed when fully alert, slow rate of feeding, small bites/sips, no straws, effortful swallow, cough every 4-5 bites/sips, quiet environment (tv off, limit talking, door closed, etc.), alternating bites and sips, OOB for ALL meals, MUST be AWAKE/ALERT for meals. Oral care with suction w/ after ALL meals. Once oral care was completed, pt is allowed ICE CHIPS between meals. However, pt MUST be FULLY upright when consuming ice chips. 5/26/21 [facility]:   lateral radiograph of the neck reveals moderate osteoarthritis of the   cervical spine. Overall there was prominent cricopharyngeal impression. Thin liquids by cup: Aspiration with cough   Thin liquids by cup, small sip with oral prep: Transient penetration x1 and   silent aspiration x1   Nectar consistency by cup: Transient penetration   Puree: Transient penetration. Mild vallecular and piriform sinus retention   Mixed consistency: Silent aspiration of fluid. Moderate vallecular retention of   fruit. Mixed consistency, small-volume: Transient penetration and vallecular retention   Nectar wash by cup: Silent aspiration of retained fluid. Nectar consistency by cup: Transient penetration        Food Allergies:  iodine,shrimp extract   Current Diet:  puree w/ nectar by tsp   Premorbid diet:  level 2 [facility] soft and nectar were recommended at home but compliance was questionable   O2 requirement:  2L nc   Social/Prior living Lives w/ son   Voice/Speech: Wnl/wfl . reduced volume. Follows commands:  with repetition   Cognitive status:  alert      Oral [facility] exam:  Dentition:natural  Lips (VII):generalized mild weakness. No droop. Tongue (XII): generalized weakness  Mandible (V): ablto open mouth wide although she stated she could not  Secretion management:wnl  Volitional cough: reduced strength     Consistencies administered: Puree, started to chew solid and spit it out, thin by cup, nectar and honey thick by tsp. Pt was viewed seated laterally at 90 degrees.

## 2023-08-11 NOTE — PLAN OF CARE

## 2023-08-12 NOTE — PLAN OF CARE
Problem: INFECTION - ADULT  Goal: Absence or prevention of progression during hospitalization  Description: INTERVENTIONS:  - Assess and monitor for signs and symptoms of infection  - Monitor lab/diagnostic results  - Monitor all insertion sites, i.e. indwelling lines, tubes, and drains  - Monitor endotracheal if appropriate and nasal secretions for changes in amount and color  - Colony appropriate cooling/warming therapies per order  - Administer medications as ordered  - Instruct and encourage patient and family to use good hand hygiene technique  - Identify and instruct in appropriate isolation precautions for identified infection/condition  Outcome: Progressing  Goal: Absence of fever/infection during neutropenic period  Description: INTERVENTIONS:  - Monitor WBC    Outcome: Progressing     Problem: PAIN - ADULT  Goal: Verbalizes/displays adequate comfort level or baseline comfort level  Description: Interventions:  - Encourage patient to monitor pain and request assistance  - Assess pain using appropriate pain scale  - Administer analgesics based on type and severity of pain and evaluate response  - Implement non-pharmacological measures as appropriate and evaluate response  - Consider cultural and social influences on pain and pain management  - Notify physician/advanced practitioner if interventions unsuccessful or patient reports new pain  Outcome: Progressing

## 2023-08-12 NOTE — ASSESSMENT & PLAN NOTE
· Patient was brought to the ED after being in altered mental status, requiring more oxygen. · History of multiple episodes of vomiting at night prior. · At the time of presentation in the ED, patient's blood glucose 58  · Historically patient has poor appetite and barely eats. · High possibility of aspiration pneumonitis vs mechanical limitation due to LSO brace as the cause  · CT shows the right lower lobe main bronchus appears occluded (2:48). The right lower lung remains aerated. Bibasilar nodular infiltrates are noted  · At the time of examination, patient is alert, oriented, frail, has poor speech. · Cardiopulmonary examination limited by brace, but otherwise unremarkable.     Plan:  Continue supplementary oxygen to maintain O2 sats above 90%  Albuterol prn   Aspiration precautions

## 2023-08-12 NOTE — ASSESSMENT & PLAN NOTE
· Patient presented to the ED with altered mental status  · At the time of admission in the ED, her blood sugar was 458 was given 50 mL of D50.   · Poor oral intake  · Blood glucose level of 48 at 1220 on 8/11  · Now resolved    Plan:  · Blood glucose checks every 6 hours  · Hypoglycemia protocol

## 2023-08-12 NOTE — ASSESSMENT & PLAN NOTE
· Patient was drowsy, had altered mental status as per the daughter of the patient.   · At the time of presentation to the ED, blood glucose 58  · Hypoglycemia vs fatigue from sleep deprivation as possible causes   · CT shows No acute intracranial abnormality  · Patient is alert oriented, moves all limbs, but has residual weakness from right MCA stroke in February 2023  · Most likely in the setting of hypoglycemia and sleep deprivation  · Now resolved      Plan:  · Continue monitoring fingerstick blood glucose every 6 hours  · Hypoglycemia protocol  · Frequent neurochecks

## 2023-08-12 NOTE — ASSESSMENT & PLAN NOTE
Patient has a history of low impact fall that resulted in multiple T-spine fractures  Currently pain controlled by morphine    Plan:  Morphine 7.5 mg bid prn  Continue back brace  Patient can be transferred to chair from bed with assistance

## 2023-08-12 NOTE — ASSESSMENT & PLAN NOTE
History of silent aspiration as seen on previous VBS  Most likely contributing to her respiratory status  Speech therapy has evaluated patient and recommends pureed diet with crushed meds.   Patient should be supervised while eating and only eat a spoonful at a time  VBS scheduled

## 2023-08-12 NOTE — PROGRESS NOTES
[facility]  Progress Note  Name: Isa Mckinley  MRN: [id number]  Unit/Bed#: ICU 04 I Date of Admission: 8/10/2023   Date of Service: 8/12/2023 I Hospital Day: 2    Assessment/Plan   * Acute respiratory failure with hypoxia [facility])  Assessment & Plan  · Patient was brought to the ED after being in altered mental status, requiring more oxygen. · History of multiple episodes of vomiting at night prior. · At the time of presentation in the ED, patient's blood glucose 58  · Historically patient has poor appetite and barely eats. · High possibility of aspiration pneumonitis vs mechanical limitation due to LSO brace as the cause  · CT shows the right lower lobe main bronchus appears occluded (2:48). The right lower lung remains aerated. Bibasilar nodular infiltrates are noted  · At the time of examination, patient is alert, oriented, frail, has poor speech. · Cardiopulmonary examination limited by brace, but otherwise unremarkable. Plan:  Continue supplementary oxygen to maintain O2 sats above 90%  Albuterol prn   Aspiration precautions          Dysphagia  Assessment & Plan  History of silent aspiration as seen on previous VBS  Most likely contributing to her respiratory status  Speech therapy has evaluated patient and recommends pureed diet with crushed meds. Patient should be supervised while eating and only eat a spoonful at a time  VBS scheduled      Altered mental status  Assessment & Plan  · Patient was drowsy, had altered mental status as per the daughter of the patient.   · At the time of presentation to the ED, blood glucose 58  · Hypoglycemia vs fatigue from sleep deprivation as possible causes   · CT shows No acute intracranial abnormality  · Patient is alert oriented, moves all limbs, but has residual weakness from right MCA stroke in February 2023  · Most likely in the setting of hypoglycemia and sleep deprivation  · Now resolved      Plan:  · Continue monitoring fingerstick blood glucose every 6 hours  · Hypoglycemia protocol  · Frequent neurochecks    Hypoglycemia  Assessment & Plan  · Patient presented to the ED with altered mental status  · At the time of admission in the ED, her blood sugar was 458 was given 50 mL of D50. · Poor oral intake  · Blood glucose level of 48 at 1220 on 8/11  · Now resolved    Plan:  · Blood glucose checks every 6 hours  · Hypoglycemia protocol    Closed T12 fracture ([address])  Assessment & Plan  Patient has a history of low impact fall that resulted in multiple T-spine fractures  Currently pain controlled by morphine    Plan:  Morphine 7.5 mg bid prn  Continue back brace  Patient can be transferred to chair from bed with assistance    History of stroke  Assessment & Plan  · Patient had right MCA stroke, s/p mechanical thrombectomy 5/20/2023  · Although the patient moves all the limbs, there is residual weakness in the left upper and lower limb  · CT head on August 8 shows No acute intracranial abnormality    Plan:  · Continue Eliquis  · Frequent neurochecks             ICU Core Measures     A: Assess, Prevent, and Manage Pain · Has pain been assessed? Yes  · Need for changes to pain regimen? Yes   B: Both SAT/SAT  · N/A   C: Choice of Sedation · RASS Goal: 0 Alert and Calm  · Need for changes to sedation or analgesia regimen? No   D: Delirium · CAM-ICU: Negative   E: Early Mobility  · Plan for early mobility? Yes   F: Family Engagement · Plan for family engagement today? Yes       Review of Invasive Devices: [place] Plan: Voiding trial after improvement in ambulation         Prophylaxis:  VTE VTE covered by:  apixaban, Oral, 5 mg at 08/12/23 0907       Stress Ulcer  covered bypantoprazole (PROTONIX) EC tablet 40 mg [[id number]]       Subjective   HPI/24hr events: No overnight events. Patient's breathing improved today. She denies chest pain or shortness of breath. Patient complains of back pain from fractures.            Objective Vitals I/O      Most Recent Min/Max in 24hrs   Temp 99 °F (37.2 °C) Temp  Min: 98.2 °F (36.8 °C)  Max: 99 °F (37.2 °C)   Pulse (!) 122 Pulse  Min: 94  Max: 124   Resp (!) 30 Resp  Min: 20  Max: 33   /59 BP  Min: 90/59  Max: 119/70   O2 Sat 98 % SpO2  Min: 88 %  Max: 100 %      Intake/Output Summary (Last 24 hours) at 8/12/2023 1154  Last data filed at 8/12/2023 0600  Gross per 24 hour   Intake 725 ml   Output 370 ml   Net 355 ml         Diet Dysphagia/Modified Consistency; Dysphagia 1-Pureed; Nectar Thick Liquid     Invasive Monitoring Physical exam    Physical Exam  Vitals and nursing note reviewed. Constitutional:       Comments: cachectic   HENT:      Head: Normocephalic. Mouth/Throat:      Mouth: Mucous membranes are moist.      Pharynx: Oropharynx is clear. No oropharyngeal exudate. Eyes:      General: No scleral icterus. Extraocular Movements: Extraocular movements intact. Conjunctiva/sclera: Conjunctivae normal.      Pupils: Pupils are equal, round, and reactive to light. Cardiovascular:      Rate and Rhythm: Regular rhythm. Tachycardia present. Pulses: Normal pulses. Heart sounds: Normal heart sounds. Pulmonary:      Effort: Pulmonary effort is normal.      Breath sounds: Normal breath sounds. Abdominal:      General: Abdomen is flat. Bowel sounds are normal. There is no distension. Musculoskeletal:      Cervical back: Normal range of motion. Right lower leg: No edema. Left lower leg: No edema. Neurological:      General: No focal deficit present. Mental Status: She is alert and oriented to person, place, and time. Mental status is at baseline.    Psychiatric:         Mood and Affect: Mood normal.         Behavior: Behavior normal.          Diagnostic Studies      EKG: telemetry reviewed  Imaging:  I have personally reviewed pertinent films in PACS     Medications:  Scheduled PRN   acetaminophen, 650 mg, Q6H KHARI  apixaban, 5 mg, BID  buPROPion, 50 mg, BID  chlorhexidine, 15 mL, Q12H KHARI  cinacalcet, 30 mg, Daily With Breakfast  docusate sodium, 100 mg, BID  DULoxetine, 60 mg, BID  metoprolol succinate, 25 mg, Daily  pantoprazole, 40 mg, Early Morning  senna, 8.6 mg, BID      albuterol, 2 puff, Q6H PRN  morphine, 7.5 mg, BID PRN       Continuous    multi-electrolyte, 50 mL/hr, Last Rate: 50 mL/hr (08/11/23 1730)         Labs:    CBC    Recent Labs     08/11/23  0508 08/12/23  0513   WBC 15.49* 13.57*   HGB 8.6* 9.7*   HCT 28.3* 32.4*    240     BMP    Recent Labs     08/11/23  0508 08/12/23  0513   SODIUM 131* 144   K 3.1* 4.1    110*   CO2 22 28   AGAP 8 6   BUN 12 14   CREATININE 0.94 0.91   CALCIUM 6.8* 8.7       Coags    Recent Labs     08/10/23  1503   INR 1.34*   PTT 32        Additional Electrolytes  Recent Labs     08/11/23  0508 08/12/23  0513   MG 1.4* 2.7   PHOS 3.0 2.4   CAIONIZED  --  1.13          Blood Gas    No recent results  Recent Labs     08/10/23  1503   PHVEN 7.345   PHG5AHM 40.3*   PO2VEN 44.4   [id number] 21.5*   BEVEN -3.9    LFTs  Recent Labs     08/10/23  1558   ALT 15   AST 23   ALKPHOS 65   ALB 2.8*   TBILI 0.63       Infectious  Recent Labs     08/10/23  1503 08/11/23  0508   PROCALCITONI 0.07 0.08     Glucose  Recent Labs     08/10/23  1558 08/11/23  0508 08/12/23  0513   GLUC 210* [address] Garcia MD

## 2023-08-13 NOTE — ASSESSMENT & PLAN NOTE
Altered mental status on presentation. Etiology possibly due to hypoglycemia, fatigue, underlying disease.   · Continue supportive care

## 2023-08-13 NOTE — PLAN OF CARE
Problem: Potential for Falls  Goal: Patient will remain free of falls  Description: INTERVENTIONS:  - Educate patient/family on patient safety including physical limitations  - Instruct patient to call for assistance with activity   - Consult OT/PT to assist with strengthening/mobility   - Keep Call bell within reach  - Keep bed low and locked with side rails adjusted as appropriate  - Keep care items and personal belongings within reach  - Initiate and maintain comfort rounds  - Make Fall Risk Sign visible to staff  - Offer Toileting every 2 Hours, in advance of need  - Initiate/Maintain bed/chair alarm  - Obtain necessary fall risk management equipment: bed/chair alarm, call bell, gripper socks, walker, bedside commode  - Apply yellow socks and bracelet for high fall risk patients  - Consider moving patient to room near nurses station  Outcome: Progressing     Problem: MOBILITY - ADULT  Goal: Maintain or return to baseline ADL function  Description: INTERVENTIONS:  - Educate patient/family on patient safety including physical limitations  - Instruct patient to call for assistance with activity   - Consult OT/PT to assist with strengthening/mobility   - Keep Call bell within reach  - Keep bed low and locked with side rails adjusted as appropriate  - Keep care items and personal belongings within reach  - Initiate and maintain comfort rounds  - Make Fall Risk Sign visible to staff  - Offer Toileting every 2 Hours, in advance of need  - Initiate/Maintain bed/chair alarm  - Obtain necessary fall risk management equipment: bed/chair alarm, call bell, gripper socks, walker, bedside commode  - Apply yellow socks and bracelet for high fall risk patients  - Consider moving patient to room near nurses station  Outcome: Progressing     Problem: PAIN - ADULT  Goal: Verbalizes/displays adequate comfort level or baseline comfort level  Description: Interventions:  - Encourage patient to monitor pain and request assistance  - Assess pain using appropriate pain scale  - Administer analgesics based on type and severity of pain and evaluate response  - Implement non-pharmacological measures as appropriate and evaluate response  - Consider cultural and social influences on pain and pain management  - Notify physician/advanced practitioner if interventions unsuccessful or patient reports new pain  Outcome: Progressing     Problem: INFECTION - ADULT  Goal: Absence or prevention of progression during hospitalization  Description: INTERVENTIONS:  - Assess and monitor for signs and symptoms of infection  - Monitor lab/diagnostic results  - Monitor all insertion sites, i.e. indwelling lines, tubes, and drains  - Monitor endotracheal if appropriate and nasal secretions for changes in amount and color  - [place] appropriate cooling/warming therapies per order  - Administer medications as ordered  - Instruct and encourage patient and family to use good hand hygiene technique  - Identify and instruct in appropriate isolation precautions (contact isolation for CRE) for identified infection/condition  Outcome: Progressing     Problem: DISCHARGE PLANNING  Goal: Discharge to home or other facility with appropriate resources  Description: INTERVENTIONS:  - Identify barriers to discharge w/patient  - Arrange for needed discharge resources and transportation as appropriate  - Identify discharge learning needs   - Refer to Case Management Department for coordinating discharge planning if the patient needs post-hospital services based on physician/advanced practitioner order or complex needs related to functional status, cognitive ability, or social support system  Outcome: Progressing     Problem: Knowledge Deficit  Goal: Patient/family/caregiver demonstrates understanding of disease process, treatment plan, medications, and discharge instructions  Description: Complete learning assessment and assess knowledge base.   Interventions:  - Provide teaching at level of understanding  - Provide teaching via preferred learning methods  Outcome: Progressing     Problem: Nutrition/Hydration-ADULT  Goal: Nutrient/Hydration intake appropriate for improving, restoring or maintaining nutritional needs  Description: Monitor and assess patient's nutrition/hydration status for malnutrition. Collaborate with interdisciplinary team and initiate plan and interventions as ordered. Monitor patient's weight and dietary intake as ordered or per policy. Utilize nutrition screening tool and intervene as necessary. Determine patient's food preferences and provide high-protein, high-caloric foods as appropriate.      INTERVENTIONS:  - Monitor oral intake, urinary output, labs, and treatment plans  - Assess nutrition and hydration status and recommend course of action  - Evaluate amount of meals eaten  - Do not leave patient alone while eating due to aspiration risk  - Assist patient with eating if necessary   - Allow adequate time for meals  - Recommend/ encourage appropriate diets, oral nutritional supplements, and vitamin/mineral supplements  - Order, calculate, and assess calorie counts as needed  - Assess need for intravenous fluids  - Provide specific nutrition/hydration education as appropriate  - Include patient in decisions related to nutrition  Outcome: Progressing     Problem: RESPIRATORY - ADULT  Goal: Achieves optimal ventilation and oxygenation  Description: INTERVENTIONS:  - Assess for changes in respiratory status  - Assess for changes in mentation and behavior  - Position to facilitate oxygenation and minimize respiratory effort  - Oxygen administered by appropriate delivery if ordered   - Encourage broncho-pulmonary hygiene including cough, deep breathe, Incentive Spirometry, flutter valve  - Assess the need for suctioning and aspirate as needed  - Assess and instruct to report SOB or any respiratory difficulty  - Respiratory Therapy support as indicated  Outcome: Progressing     Problem: GENITOURINARY - ADULT  Goal: Maintains or returns to baseline urinary function  Description: INTERVENTIONS:  - Assess urinary function  - Encourage oral fluids to ensure adequate hydration if ordered  - Administer IV fluids as ordered to ensure adequate hydration  - Administer ordered medications as needed  - Offer frequent toileting  - Follow urinary retention protocol if ordered  Outcome: Progressing  Goal: Urinary catheter remains patent  Description: INTERVENTIONS:  - Assess patency of urinary catheter  - Follow guidelines for intermittent irrigation of non-functioning urinary catheter  Outcome: Progressing

## 2023-08-13 NOTE — ASSESSMENT & PLAN NOTE
History of low impact fall that resulted in multiple T-spine fractures  · Receiving pain control with Morphine PRN

## 2023-08-13 NOTE — PROGRESS NOTES
[facility]  Progress Note  Name: Omari Perez  MRN: [id number]  Unit/Bed#: E2 -74 I Date of Admission: 8/10/2023   Date of Service: 8/13/2023 I Hospital Day: 3    Assessment/Plan   * Acute respiratory failure with hypoxia [facility])  Assessment & Plan  Acute respiratory failure with hypoxia secondary to aspiration pneumonitis. · Continue daily weaning trials  · Albuterol PRN    Dysphagia  Assessment & Plan  Currently on pureed and nectar thick, but still with aspiration. · Awaiting formal speech note. · I initiated goals of care with our patient this afternoon, however patient still has not decided whether she would want a feeding tube. She would like to remain on full CODE STATUS. I attempted to reach out to her daughter, but went to voicemail despite trying twice. I left a voicemail requesting callback. Hypoglycemia  Assessment & Plan  Resolved    Recent Labs     08/12/23  0013 08/12/23  0645 08/12/23  1745 08/13/23  0059 08/13/23  0548 08/13/23  1229   POCGLU 155* 100 123 106 164* 122         Electrolyte imbalance  Assessment & Plan  Resolved    Recent Labs     08/10/23  1558 08/11/23  0508 08/12/23  0513   K 3.7 3.1* 4.1   MG  --  1.4* 2.7         Closed T12 fracture (HCC)  Assessment & Plan  History of low impact fall that resulted in multiple T-spine fractures  · Receiving pain control with Morphine PRN    Altered mental status  Assessment & Plan  Altered mental status on presentation. Etiology possibly due to hypoglycemia, fatigue, underlying disease. · Continue supportive care    History of stroke  Assessment & Plan  History of right MCA stroke s/p mechanical thrombectomy 5/2023. Residual left sided weakness. 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   · Continue Plaquenil         VTE Pharmacologic Prophylaxis:   Pharmacologic: Apixaban (Eliquis)  Mechanical VTE Prophylaxis in Place: Yes    Discussions with Specialists or Other Care Team Provider: nursing, speech    Education and Discussions with Family / Patient: patient, called porter twice no answer left vm    Current Length of Stay: 3 day(s)    Current Patient Status: Inpatient   Certification Statement: The patient will continue to require additional inpatient hospital stay due to diet esclation, goals of care    Discharge Plan: active    Code Status: Level 1 - Full Code      Subjective:   Patient seen and examined at bedside. I attempted to reach out to [facility] to inform her about our concerning findings. I explained to our patient that speech is concerned that even with puréed and nectar thick, she continues to aspirate. I attempted to do goals of care at bedside. Unfortunately, patient would like to remain in full code. She did not however commit to feeding tube placement at this time. She would like to discuss this further with her daughter. Objective:     Vitals:   Temp (24hrs), Av.3 °F (36.8 °C), Min:97.1 °F (36.2 °C), Max:99.3 °F (37.4 °C)    Temp:  [97.1 °F (36.2 °C)-99.3 °F (37.4 °C)] 97.8 °F (36.6 °C)  HR:  [] 83  Resp:  [18-26] 18  BP: ()/(57-80) 126/80  SpO2:  [92 %-98 %] 94 %  Body mass index is 19.75 kg/m². Input and Output Summary (last 24 hours): Intake/Output Summary (Last 24 hours) at 2023 1351  Last data filed at 2023 0301  Gross per 24 hour   Intake 685 ml   Output 275 ml   Net 410 ml       Physical Exam:     Physical Exam  Vitals reviewed. HENT:      Head: Normocephalic. Nose: Nose normal.   Eyes:      General: No scleral icterus. Pupils: Pupils are equal, round, and reactive to light. Cardiovascular:      Rate and Rhythm: Normal rate and regular rhythm. Pulmonary:      Effort: Pulmonary effort is normal. No respiratory distress. Abdominal:      Palpations: Abdomen is soft. Tenderness:  There is no abdominal tenderness. Musculoskeletal:      Comments: Kyphotic, large lump on her back   Skin:     General: Skin is warm. Neurological:      Mental Status: She is alert. Mental status is at baseline. Psychiatric:         Mood and Affect: Mood normal.         Behavior: Behavior normal.       Additional Data:     Labs:    Results from last 7 days   Lab Units 08/12/23  0513   WBC Thousand/uL 13.57*   HEMOGLOBIN g/dL 9.7*   HEMATOCRIT % 32.4*   PLATELETS Thousands/uL 240   NEUTROS PCT % 91*   LYMPHS PCT % 4*   MONOS PCT % 4   EOS PCT % 0     Results from last 7 days   Lab Units 08/12/23  0513 08/11/23  0508 08/10/23  1558   SODIUM mmol/L 144   < > 141   POTASSIUM mmol/L 4.1   < > 3.7   CHLORIDE mmol/L 110*   < > 110*   CO2 mmol/L 28   < > 23   BUN mg/dL 14   < > 13   CREATININE mg/dL 0.91   < > 0.91   ANION GAP mmol/L 6   < > 8   CALCIUM mg/dL 8.7   < > 7.9*   ALBUMIN g/dL  --   --  2.8*   TOTAL BILIRUBIN mg/dL  --   --  0.63   ALK PHOS U/L  --   --  65   ALT U/L  --   --  15   AST U/L  --   --  23   GLUCOSE RANDOM mg/dL 94   < > 210*    < > = values in this interval not displayed. Results from last 7 days   Lab Units 08/10/23  1503   INR  1.34*     Results from last 7 days   Lab Units 08/13/23  1229 08/13/23  0548 08/13/23  0059 08/12/23  1745 08/12/23  0645 08/12/23  0013 08/11/23  2126 08/11/23  1740 08/11/23  1254 08/11/23  1216 08/11/23  0858 08/10/23  1504   POC GLUCOSE mg/dl 122 164* 106 123 100 155* 128 172* 84 48* 79 58*         Results from last 7 days   Lab Units 08/11/23  0508 08/10/23  1710 08/10/23  1503   LACTIC ACID mmol/L  --  2.0 2.7*   PROCALCITONIN ng/ml 0.08  --  0.07           * I Have Reviewed All Lab Data Listed Above. * Additional Pertinent Lab Tests Reviewed:  [address] Admission Reviewed      Lines:   Invasive Devices     Peripheral Intravenous Line  Duration           Peripheral IV 08/12/23 Right Antecubital 1 day          Drain  Duration           Urethral Catheter Latex; Temperature probe 16 Fr. 2 days                   Imaging:    Imaging Reports Reviewed Today Include: imaging since admission    Recent Cultures (last 7 days):     Results from last 7 days   Lab Units 08/11/23  0813 08/10/23  1504 08/10/23  1503   BLOOD CULTURE   --  No Growth at 48 hrs. No Growth at 48 hrs. LEGIONELLA URINARY ANTIGEN  Negative  --   --        Last 24 Hours Medication List:   Current Facility-Administered Medications   Medication Dose Route Frequency Provider Last Rate   • acetaminophen  650 mg Oral Q6H [address] Lianet Marroquin PA-C     • albuterol  2 puff Inhalation Q6H PRN Lianet Marroquin PA-C     • apixaban  5 mg Oral BID Lianet Marroquin PA-C     • buPROPion  50 mg Oral BID Lianet Marroquin PA-C     • cinacalcet  30 mg Oral Daily With Breakfast Lianet Marroquin PA-C     • docusate sodium  100 mg Oral BID Lianet Marroquin PA-C     • DULoxetine  60 mg Oral BID Lianet Marroquin PA-C     • hydroxychloroquine  200 mg Oral BID With Meals John Jolley MD     • metoprolol succinate  25 mg Oral Daily Lianet Marroquin PA-C     • morphine  7.5 mg Oral BID PRN Lianet Marroquin PA-C     • omeprazole (PRILOSEC) suspension 2 mg/mL  40 mg Oral Early Morning Lianet Marroquin PA-C     • senna  8.6 mg Oral BID Lianet Harrell PA-C          Today, Patient Was Seen By: Jagruti Trujillo MD    ** Please Note: Dictation voice to text software may have been used in the creation of this document.  **

## 2023-08-13 NOTE — ASSESSMENT & PLAN NOTE
Resolved    Recent Labs     08/12/23  0013 08/12/23  0645 08/12/23  1745 08/13/23  0059 08/13/23  0548 08/13/23  1229   POCGLU 155* 100 123 106 164* 122

## 2023-08-13 NOTE — PROCEDURES
Video Swallow Study      Patient Name: Ferdinand Cobian  PRLRD'X Date: 8/13/2023        Past Medical History  Past Medical History:   Diagnosis Date   • Anxiety    • Asthma    • Bipolar depression ([address])    • Chronic narcotic dependence ([address])    • Chronic pain    • Depression    • Enterovirus heart infection    • SHAR (obstructive sleep apnea)    • Osteoarthritis    • Peripheral neuropathy    • Plantar fasciitis of right foot    • Senile osteoporosis    • Seronegative arthropathy of multiple sites [facility])    • Undifferentiated connective tissue disease ([address])         Past Surgical History  Past Surgical History:   Procedure Laterality Date   • BREAST IMPLANT     • FIXATION KYPHOPLASTY LUMBAR SPINE     • HYSTERECTOMY     • INCONTINENCE SURGERY N/A    • IR STROKE ALERT  2/12/2023   • NASAL SEPTOPLASTY W/ TURBINOPLASTY N/A    • RECTAL PROLAPSE REPAIR N/A    • REPAIR RECTOCELE     • RHINOPLASTY N/A      Modified (Video) Barium Swallow Study    Summary:  Images are on PACS for review. Pt was given a few ice chips prior to the study at her request.     Oral stage: Moderate. No anterior spill. Trailed solid. The pt spit it out. Reduced bolus formation and control. Spill to at least the valleculae. Lingual pump transfer. Intermittent piecemeal transfer. Weak transfer. Intermittent lingual residue, palatal/velar coating. Pharyngeal stage: Mod/*Severe-Severe. The pt's C spine was severely curved. The bolus spilled to the valleculae and over the epiglottis  prior to the swallow. Given the curvature, pharyngeal constriction was not consistent along the PPW. Inconsistent epiglottic inversion, Minimal hyoid movement. Reduced laryngeal elevation. Reduced tongue base retraction. Full vallecular retention, Intermittent mild PPW coating. Eventual transient penetration w/ puree, then trace penetration, then aspiration. No cough. Aspiration w/ tsps nectar thick, honey thick, and cup sip thin. Aspiration was prior to or during the swallow. Only cough response was on larger amt thin liquids. Per gross esophageal screen: The esophagus was full following puree presentations. Partial clearance w/ thick, however distal esophageal retropulsion was observed. Strategies: The pt had her neck slightly flexed to begin the study. She was cued for slight neck flexion. Chin down may have slightly reduced retention however the pt did not maintain. Cued to keep head at neutral. This also minimally reduced retention and improved hyoid excursion. Slight neck extension appeared to be the "natural" position for pt to swallow, as she kept resuming this. Cued cough did not clear the airway. Recommendations:  Pt is currently refusing a feeding tube. Risk of possible ongoing hospital admits, respiratory consequence, and weight loss/nutritional needs were d/w the pt, as well as QOL. She would like to continue a diet at this time. Diet: puree as tolerated  Liquids: nectar (dislikes the honey thick even more). Ok ice chips. Meds: crush  Strategies: head forward/at mildine, swallow hard, swallow x 2, liquids by tsp  Frequent oral care  Upright position  F/u ST tx: ? Brief f/u  Therapy Prognosis: guarded  Prognosis considerations: ongoing dysphagia w/ aspiration  Close Supervision  Aspiration Precautions  Reflux Precautions  Consider consult with: palliative  Results reviewed with: pt, nursing, physician immediately following the study  Aspiration precautions posted. Repeat MBS as/if necessary  If a dedicated assessment of the esophagus is desired:Consider EGD      Goals:  Pt will tolerate least restrictive diet w/ minimal s/s aspiration or oral/pharyngeal difficulties. Pt will follow above listed strategies to reduce risk on 80% of swallows w/ min cues. Patient's goal:currently does not want a feeding tube    Pt is a 68yof referred for MBS. H/o silent aspiration. Had VBS at [facility] 3/9/23 and vbs at [facility] 2021.  H/o R MCA stroke. H&P/pertinent provider notes: (PMH noted above)  HPI: Valeria Callejas is a 70 y.o. who presents with hx of stroke s/p L mca thrombectomy in 2/23, closed t12 fx with LSO brace, chronic t9,t11 fxs, kyphosis, bipolar, osteoporosis, dara, asthma, gerd, chf with ef 30% and mod AR, s/p fall on 6/30 and arc admission on 7/7/23 who was brought to ER by ems for worsening MS and hypoxia. Pt stated that she wears 2L O2 at home. Pt was initially put on NC and then midflow and transitioned to bipap in ER. Pt was awake, alert and writing. Pt treated for sepsis and given ativan and pt progressively got more tired t/o night. Pt admitted to sd[facility] for bipap monitoring. Special Studies:  CT chest/abd/pelvis 8/10/23  Images are motion affected; the right lower lobe main bronchus appears occluded (2:48). The right lower lung remains aerated. Bibasilar nodular infiltrates are noted. . Recommend pulmonary consultation and follow-up to resolution. Small to moderate left pleural effusion. There appears to be a split pleura sign however evaluation is limited due to the lack of IV contrast. Recommend clinical correlation. .  CT C spine 8/3/23: (see complete study for other details)  ALIGNMENT: Alignment appears unchanged compared to the prior study. Exaggeration of cervical lordosis is similar to the prior study. Mild degenerative retrolisthesis of C3 upon C4 and mild degenerative anterolisthesis of C4 upon C5 appears unchanged. Mild degenerative anterolisthesis of T1 upon T2 unchanged. No jumped facets. 3/17/21 x ray of lumbar spine:  IMPRESSION:   1. Increase S-shaped scoliosis and mild retrolisthesis of L2 since the prior   x-rays of 8/2/2016.   2. Loss of disc height and right lateral osteophytes at L4-5 and L5-S1.   3. Moderate to severe anterior wedge compression fracture of L1, status post   vertebroplasty. CT head 8/10/23:  Suboptimal positioning. No acute intracranial abnormality. CT head 2/22/23:  1. New small acute /more recent interval lacunar infarction in the right cerebral peduncle (series 4, image 14). 2.  Extensive multifocal large, previously present right middle cerebral artery infarction which is evolving with superimposed areas of evolving hemorrhage likely related to hemorrhagic transformation of subacute right MCA infarction. No significant   mass effect. 3.  Mild, chronic microangiopathy. Previous MBS:  3/9/23 [facility] ARC:  Assessment Summary:    Pt presents with moderate-severe oropharyngeal dysphagia where pt demonstrated weakened lip seal noted to be mildly decreased around tsp, leading to inconsistent trace anterior loss on initial tsp presentations but likely due to increased xerostomia which was observed. Bolus control of thin and NT liquids by tsp, cup and straw was observed to be decreased, leading to premature spill to pharynx prior to swallow. Bolus manipulation is delayed given puree noting spill to pharynx prior to swallow. Mastication was PROLONGED with soft solids (both banana and peaches) administered today. Bolus formation given soft solids was observed to be decreased to where despite prolonged mastication given these textures overall effectiveness in breakdown/formulation was decreased noting that pt did swallow banana >peaches more so whole. Pt did have an episode of brief nasal regurgitation x1 w/ NT by tsp. Transition between with oral and pharyngeal stages was moderately delayed w/ puree, NT and thin textures administered. Hyolaryngeal excursion was decreased given all consistencies. It was observed that BOT retractions is weaker which it was observed that pt did have fairly consistent retention given puree and soft textures. Also observed during assessment was weakened/decreased pharyngeal constriction across textures which lead to moderate amount of pharyngeal retention (more vallecular vs pyriform sinus retention).  Pt did also present w/ trace to minimal PPW w/ all consistencies observed. While pt's epiglottis was inverting during swallow, overall timing given airway protection during swallows more w/ NT and thins was decreased. Pt was observed w/ sluggish movement/return of epiglottis intermittently during assessment. It was observed that pt did have a cricopharyngeal prominence around to C5-C6 level which did NOT impinge passage of boluses. Unfortunately, pt did have increased laryngeal penetration which ranged from high level but then deep to the level of the vocal cords as well as then mostly silent aspiration events w/ thin liquids but also NT by straw. Refer to below for observations for full report for specific event w/ penetration and aspiration. Note: Images are available for review in PACS as desired. Recommendations:   Recommended Diet:  puree/level 1 diet and nectar thick liquids by CUP ONLY-NO STRAWS  Recommended Form of Medications: whole with puree   Aspiration precautions and compensatory swallowing strategies: upright posture, only feed when fully alert, slow rate of feeding, small bites/sips, no straws, effortful swallow, cough every 4-5 bites/sips, quiet environment (tv off, limit talking, door closed, etc.), alternating bites and sips, OOB for ALL meals, MUST be AWAKE/ALERT for meals.     Oral care with suction w/ after ALL meals. Once oral care was completed, pt is allowed ICE CHIPS between meals. However, pt MUST be FULLY upright when consuming ice chips. 5/26/21 [facility]:   lateral radiograph of the neck reveals moderate osteoarthritis of the   cervical spine. Overall there was prominent cricopharyngeal impression. Thin liquids by cup: Aspiration with cough   Thin liquids by cup, small sip with oral prep: Transient penetration x1 and   silent aspiration x1   Nectar consistency by cup: Transient penetration   Puree: Transient penetration. Mild vallecular and piriform sinus retention   Mixed consistency: Silent aspiration of fluid.  Moderate vallecular retention of   fruit. Mixed consistency, small-volume: Transient penetration and vallecular retention   Nectar wash by cup: Silent aspiration of retained fluid. Nectar consistency by cup: Transient penetration      Food Allergies:  iodine,shrimp extract   Current Diet:  puree w/ nectar by tsp   Premorbid diet:  level 2 [facility] soft and nectar were recommended at home but compliance was questionable   O2 requirement:  2L nc   Social/Prior living Lives w/ son   Voice/Speech: Wnl/wfl . reduced volume. Follows commands:  with repetition   Cognitive status:  alert     Oral [facility] exam:  Dentition:natural  Lips (VII):generalized mild weakness. No droop. Tongue (XII): generalized weakness  Mandible (V): ablto open mouth wide although she stated she could not  Secretion management:wnl  Volitional cough: reduced strength    Consistencies administered: Puree, started to chew solid and spit it out, thin by cup, nectar and honey thick by tsp. Pt was viewed seated laterally at 90 degrees.

## 2023-08-13 NOTE — ASSESSMENT & PLAN NOTE
Currently on pureed and nectar thick, but still with aspiration. · Awaiting formal speech note. · I initiated goals of care with our patient this afternoon, however patient still has not decided whether she would want a feeding tube. She would like to remain on full CODE STATUS. I attempted to reach out to her daughter, but went to voicemail despite trying twice. I left a voicemail requesting callback.

## 2023-08-13 NOTE — ASSESSMENT & PLAN NOTE
Acute respiratory failure with hypoxia secondary to aspiration pneumonitis.   · Continue daily weaning trials  · Albuterol PRN

## 2023-08-13 NOTE — ASSESSMENT & PLAN NOTE
History of right MCA stroke s/p mechanical thrombectomy 5/2023. Residual left sided weakness.   · Continue Eliquis

## 2023-08-13 NOTE — PLAN OF CARE
Problem: Potential for Falls  Goal: Patient will remain free of falls  Description: INTERVENTIONS:  - Educate patient/family on patient safety including physical limitations  - Instruct patient to call for assistance with activity   - Consult OT/PT to assist with strengthening/mobility   - Keep Call bell within reach  - Keep bed low and locked with side rails adjusted as appropriate  - Keep care items and personal belongings within reach  - Initiate and maintain comfort rounds  - Make Fall Risk Sign visible to staff  - Offer Toileting every 2 Hours, in advance of need  - Initiate/Maintain bed/chair alarm  - Obtain necessary fall risk management equipment: bed/chair alarm, call bell, gripper socks, walker, bedside commode  - Apply yellow socks and bracelet for high fall risk patients  - Consider moving patient to room near nurses station  Outcome: Progressing     Problem: MOBILITY - ADULT  Goal: Maintain or return to baseline ADL function  Description: INTERVENTIONS:  - Educate patient/family on patient safety including physical limitations  - Instruct patient to call for assistance with activity   - Consult OT/PT to assist with strengthening/mobility   - Keep Call bell within reach  - Keep bed low and locked with side rails adjusted as appropriate  - Keep care items and personal belongings within reach  - Initiate and maintain comfort rounds  - Make Fall Risk Sign visible to staff  - Offer Toileting every 2 Hours, in advance of need  - Initiate/Maintain bed/chair alarm  - Obtain necessary fall risk management equipment: bed/chair alarm, call bell, gripper socks, walker, bedside commode  - Apply yellow socks and bracelet for high fall risk patients  - Consider moving patient to room near nurses station  Outcome: Progressing     Problem: PAIN - ADULT  Goal: Verbalizes/displays adequate comfort level or baseline comfort level  Description: Interventions:  - Encourage patient to monitor pain and request assistance  - Assess pain using appropriate pain scale  - Administer analgesics based on type and severity of pain and evaluate response  - Implement non-pharmacological measures as appropriate and evaluate response  - Consider cultural and social influences on pain and pain management  - Notify physician/advanced practitioner if interventions unsuccessful or patient reports new pain  Outcome: Progressing     Problem: SAFETY ADULT  Goal: Patient will remain free of falls  Description: INTERVENTIONS:  - Educate patient/family on patient safety including physical limitations  - Instruct patient to call for assistance with activity   - Consult OT/PT to assist with strengthening/mobility   - Keep Call bell within reach  - Keep bed low and locked with side rails adjusted as appropriate  - Keep care items and personal belongings within reach  - Initiate and maintain comfort rounds  - Make Fall Risk Sign visible to staff  - Offer Toileting every 2 Hours, in advance of need  - Initiate/Maintain bed/chair alarm  - Obtain necessary fall risk management equipment: bed/chair alarm, call bell, gripper socks, walker, bedside commode  - Apply yellow socks and bracelet for high fall risk patients  - Consider moving patient to room near nurses station  Outcome: Progressing  Goal: Maintain or return to baseline ADL function  Description: INTERVENTIONS:  - Educate patient/family on patient safety including physical limitations  - Instruct patient to call for assistance with activity   - Consult OT/PT to assist with strengthening/mobility   - Keep Call bell within reach  - Keep bed low and locked with side rails adjusted as appropriate  - Keep care items and personal belongings within reach  - Initiate and maintain comfort rounds  - Make Fall Risk Sign visible to staff  - Offer Toileting every 2 Hours, in advance of need  - Initiate/Maintain bed/chair alarm  - Obtain necessary fall risk management equipment: bed/chair alarm, call bell, gripper socks, walker, bedside commode  - Apply yellow socks and bracelet for high fall risk patients  - Consider moving patient to room near nurses station  Outcome: Progressing     Problem: Prexisting or High Potential for Compromised Skin Integrity  Goal: Skin integrity is maintained or improved  Description: INTERVENTIONS:  - Identify patients at risk for skin breakdown  - Assess and monitor skin integrity  - Assess and monitor nutrition and hydration status  - Monitor labs   - Assess for incontinence   - Turn and reposition patient  - Assist with mobility/ambulation  - Relieve pressure over bony prominences  - Avoid friction and shearing  - Provide appropriate hygiene as needed including keeping skin clean and dry  - Evaluate need for skin moisturizer/barrier cream  - Collaborate with interdisciplinary team   - Patient/family teaching  - Consider wound care consult   Outcome: Progressing     Problem: RESPIRATORY - ADULT  Goal: Achieves optimal ventilation and oxygenation  Description: INTERVENTIONS:  - Assess for changes in respiratory status  - Assess for changes in mentation and behavior  - Position to facilitate oxygenation and minimize respiratory effort  - Oxygen administered by appropriate delivery if ordered   - Encourage broncho-pulmonary hygiene including cough, deep breathe, Incentive Spirometry, flutter valve  - Assess the need for suctioning and aspirate as needed  - Assess and instruct to report SOB or any respiratory difficulty  - Respiratory Therapy support as indicated  Outcome: Progressing     Problem: GENITOURINARY - ADULT  Goal: Urinary catheter remains patent  Description: INTERVENTIONS:  - Assess patency of urinary catheter  - Follow guidelines for intermittent irrigation of non-functioning urinary catheter  Outcome: Progressing

## 2023-08-13 NOTE — ASSESSMENT & PLAN NOTE
Resolved    Recent Labs     08/10/23  1558 08/11/23  0508 08/12/23  0513   K 3.7 3.1* 4.1   MG  --  1.4* 2.7

## 2023-08-14 NOTE — PROGRESS NOTES
[facility]  Progress Note  Name: Reyes Honer  MRN: [id number]  Unit/Bed#: E2 -23 I Date of Admission: 8/10/2023   Date of Service: 8/14/2023 I Hospital Day: 4    Assessment/Plan   * Acute respiratory failure with hypoxia [facility])  Assessment & Plan  Acute respiratory failure with hypoxia secondary to aspiration pneumonitis. · Continue daily weaning trials  · Albuterol PRN    Dysphagia  Assessment & Plan  Currently on pureed and nectar thick, but still with aspiration. · Awaiting formal speech note. · Goals of care done. Patient would like to remain full code. I also spoke to her daughter Jayden Romo over the phone. I explained to her the rationale for this question. It is because our patient would like to continue eating puréed food even if it is at this point contraindicated. She continues to decline PEG tube placement (but stated that she remains open for discussion). Her daughter would speak to her at bedside today to assist us with her treatment plan. I explained to her that if she continues to eat puréed food, she is a high risk for aspiration which may lead to significant complications down the road. They will inform us once our patient elects to either proceed with PEG placement, or consider comfort measures with pleasure feeds. Hypoglycemia  Assessment & Plan  Resolved    Recent Labs     08/13/23  0059 08/13/23  0548 08/13/23  1229 08/13/23  1743 08/13/23  2357 08/14/23  0549   POCGLU 106 164* 122 147* 94 106         Electrolyte imbalance  Assessment & Plan  Replete PRN    Recent Labs     08/12/23  0513 08/14/23  0518   K 4.1 3.6   MG 2.7 1.5*         Closed T12 fracture (HCC)  Assessment & Plan  History of low impact fall that resulted in multiple T-spine fractures  · Receiving pain control with Morphine PRN    Altered mental status  Assessment & Plan  Altered mental status on presentation.  Etiology possibly due to hypoglycemia, fatigue, underlying disease. · Continue supportive care    History of stroke  Assessment & Plan  History of right MCA stroke s/p mechanical thrombectomy 2023. Residual left sided weakness. 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 · Continue Plaquenil         VTE Pharmacologic Prophylaxis:   Pharmacologic: Apixaban (Eliquis)  Mechanical VTE Prophylaxis in Place: Yes    Discussions with Specialists or Other Care Team Provider: nursing    Education and Discussions with Family / Patient: patient, daughter    Current Length of Stay: 4 day(s)    Current Patient Status: Inpatient   Certification Statement: The patient will continue to require additional inpatient hospital stay due to goals of care    Discharge Plan: active    Code Status: Level 1 - Full Code      Subjective:   Patient seen and examined at bedside. I had a long discussion with her daughter over the telephone. I explained to them that ideally she should be on n.p.o. status at this time. However, Bruno Lassiter does not want a feeding tube at this time. I made him aware that a definite decision needs to take place so that we could determine if we are going to be putting a feeding tube or considering discharge with comfort measures/pleasure feeding. Patient is adamant that she would like to remain on full CODE STATUS, hence I encouraged her to consider PEG tube placement. Objective:     Vitals:   Temp (24hrs), Av.9 °F (36.6 °C), Min:97.5 °F (36.4 °C), Max:98.4 °F (36.9 °C)    Temp:  [97.5 °F (36.4 °C)-98.4 °F (36.9 °C)] 98.4 °F (36.9 °C)  HR:  [87-98] 98  Resp:  [18-20] 20  BP: (103-111)/(67-69) 103/67  SpO2:  [96 %-98 %] 98 %  Body mass index is 19.9 kg/m². Input and Output Summary (last 24 hours):        Intake/Output Summary (Last 24 hours) at 2023 1403  Last data filed at 2023 1238  Gross per 24 hour   Intake 600 ml Output 350 ml   Net 250 ml       Physical Exam:     Physical Exam  Vitals reviewed. Constitutional:       General: She is not in acute distress. HENT:      Head: Normocephalic. Nose: Nose normal.      Mouth/Throat:      Mouth: Mucous membranes are moist.   Eyes:      General: No scleral icterus. Neck:      Comments: lordosis  Cardiovascular:      Rate and Rhythm: Normal rate. Pulmonary:      Effort: Pulmonary effort is normal. No respiratory distress. Abdominal:      General: There is no distension. Palpations: Abdomen is soft. Skin:     General: Skin is warm. Neurological:      Mental Status: She is alert. Mental status is at baseline. Psychiatric:         Mood and Affect: Mood normal.         Behavior: Behavior normal.       Additional Data:     Labs:    Results from last 7 days   Lab Units 08/14/23  0518   WBC Thousand/uL 8.74   HEMOGLOBIN g/dL 9.0*   HEMATOCRIT % 29.4*   PLATELETS Thousands/uL 211   NEUTROS PCT % 84*   LYMPHS PCT % 10*   MONOS PCT % 5   EOS PCT % 0     Results from last 7 days   Lab Units 08/14/23  0518 08/11/23  0508 08/10/23  1558   SODIUM mmol/L 141   < > 141   POTASSIUM mmol/L 3.6   < > 3.7   CHLORIDE mmol/L 110*   < > 110*   CO2 mmol/L 27   < > 23   BUN mg/dL 12   < > 13   CREATININE mg/dL 0.61   < > 0.91   ANION GAP mmol/L 4   < > 8   CALCIUM mg/dL 7.8*   < > 7.9*   ALBUMIN g/dL  --   --  2.8*   TOTAL BILIRUBIN mg/dL  --   --  0.63   ALK PHOS U/L  --   --  65   ALT U/L  --   --  15   AST U/L  --   --  23   GLUCOSE RANDOM mg/dL 88   < > 210*    < > = values in this interval not displayed.      Results from last 7 days   Lab Units 08/10/23  1503   INR  1.34*     Results from last 7 days   Lab Units 08/14/23  0549 08/13/23  2357 08/13/23  1743 08/13/23  1229 08/13/23  0548 08/13/23  0059 08/12/23  1745 08/12/23  0645 08/12/23  0013 08/11/23  2126 08/11/23  1740 08/11/23  1254   POC GLUCOSE mg/dl 106 94 147* 122 164* 106 123 100 155* 128 172* 84         Results from last 7 days   Lab Units 08/11/23  0508 08/10/23  1710 08/10/23  1503   LACTIC ACID mmol/L  --  2.0 2.7*   PROCALCITONIN ng/ml 0.08  --  0.07           * I Have Reviewed All Lab Data Listed Above. * Additional Pertinent Lab Tests Reviewed: [address] Admission Reviewed      Lines:   Invasive Devices     Peripheral Intravenous Line  Duration           Peripheral IV 08/12/23 Right Antecubital 2 days          Drain  Duration           Urethral Catheter Latex; Temperature probe 16 Fr. 3 days                   Imaging:    Imaging Reports Reviewed Today Include: no new imaging    Recent Cultures (last 7 days):     Results from last 7 days   Lab Units 08/11/23  0813 08/10/23  1504 08/10/23  1503   BLOOD CULTURE   --  No Growth at 72 hrs. No Growth at 72 hrs.    LEGIONELLA URINARY ANTIGEN  Negative  --   --        Last 24 Hours Medication List:   Current Facility-Administered Medications   Medication Dose Route Frequency Provider Last Rate   • acetaminophen  650 mg Oral Q6H [address] Lianet Marroquin PA-C     • albuterol  2 puff Inhalation Q6H PRN Lianet Marroquin PA-C     • aluminum-magnesium hydroxide-simethicone  30 mL Oral Q4H PRN Darshan Rodriguez PA-C     • apixaban  5 mg Oral BID Lianet Marroquin PA-C     • buPROPion  50 mg Oral BID Lianet Marroquin PA-C     • cinacalcet  30 mg Oral Daily With Breakfast Lianet Marroquin PA-C     • docusate sodium  100 mg Oral BID Lianet Marroquin PA-C     • DULoxetine  60 mg Oral BID Lianet Marroquin PA-C     • hydroxychloroquine  200 mg Oral BID With Meals Mishel Arevalo MD     • magnesium sulfate  2 g Intravenous Once Mishel Arevalo MD     • metoprolol succinate  25 mg Oral Daily Lianet Marroquin PA-C     • morphine  7.5 mg Oral Q6H PRN Mishel Arevalo MD     • omeprazole (PRILOSEC) suspension 2 mg/mL  40 mg Oral Early Morning Lianet Marroquin PA-C     • senna  8.6 mg Oral BID Princess Ding PA-C          Today, Patient Was Seen By: Osmany Byrne, MD    ** Please Note: Dictation voice to text software may have been used in the creation of this document.  **

## 2023-08-14 NOTE — PLAN OF CARE
Problem: Potential for Falls  Goal: Patient will remain free of falls  Description: INTERVENTIONS:  - Educate patient/family on patient safety including physical limitations  - Instruct patient to call for assistance with activity   - Consult OT/PT to assist with strengthening/mobility   - Keep Call bell within reach  - Keep bed low and locked with side rails adjusted as appropriate  - Keep care items and personal belongings within reach  - Initiate and maintain comfort rounds  - Make Fall Risk Sign visible to staff  - Offer Toileting every 2 Hours, in advance of need  - Initiate/Maintain bed/chair alarm  - Obtain necessary fall risk management equipment: bed/chair alarm, call bell, gripper socks, walker, bedside commode  - Apply yellow socks and bracelet for high fall risk patients  - Consider moving patient to room near nurses station  Outcome: Progressing     Problem: MOBILITY - ADULT  Goal: Maintain or return to baseline ADL function  Description: INTERVENTIONS:  - Educate patient/family on patient safety including physical limitations  - Instruct patient to call for assistance with activity   - Consult OT/PT to assist with strengthening/mobility   - Keep Call bell within reach  - Keep bed low and locked with side rails adjusted as appropriate  - Keep care items and personal belongings within reach  - Initiate and maintain comfort rounds  - Make Fall Risk Sign visible to staff  - Offer Toileting every 2 Hours, in advance of need  - Initiate/Maintain bed/chair alarm  - Obtain necessary fall risk management equipment: bed/chair alarm, call bell, gripper socks, walker, bedside commode  - Apply yellow socks and bracelet for high fall risk patients  - Consider moving patient to room near nurses station  Outcome: Progressing  Goal: Maintains/Returns to pre admission functional level  Description: INTERVENTIONS:  - Perform BMAT or MOVE assessment daily.   - Set and communicate daily mobility goal to care team and patient. - Collaborate with rehabilitation services on mobility goals if consulted  - Reposition patient every 2 hours.   - Dangle patient 3 times a day  - Stand patient 3 times a day  - Ambulate patient 3 times a day  - Out of bed to chair 3 times a day   - Out of bed for meals  - Out of bed for toileting  - Record patient progress and toleration of activity level   Outcome: Progressing     Problem: PAIN - ADULT  Goal: Verbalizes/displays adequate comfort level or baseline comfort level  Description: Interventions:  - Encourage patient to monitor pain and request assistance  - Assess pain using appropriate pain scale  - Administer analgesics based on type and severity of pain and evaluate response  - Implement non-pharmacological measures as appropriate and evaluate response  - Consider cultural and social influences on pain and pain management  - Notify physician/advanced practitioner if interventions unsuccessful or patient reports new pain  Outcome: Progressing     Problem: SAFETY ADULT  Goal: Patient will remain free of falls  Description: INTERVENTIONS:  - Educate patient/family on patient safety including physical limitations  - Instruct patient to call for assistance with activity   - Consult OT/PT to assist with strengthening/mobility   - Keep Call bell within reach  - Keep bed low and locked with side rails adjusted as appropriate  - Keep care items and personal belongings within reach  - Initiate and maintain comfort rounds  - Make Fall Risk Sign visible to staff  - Offer Toileting every 2 Hours, in advance of need  - Initiate/Maintain bed/chair alarm  - Obtain necessary fall risk management equipment: bed/chair alarm, call bell, gripper socks, walker, bedside commode  - Apply yellow socks and bracelet for high fall risk patients  - Consider moving patient to room near nurses station  Outcome: Progressing  Goal: Maintain or return to baseline ADL function  Description: INTERVENTIONS:  - Educate patient/family on patient safety including physical limitations  - Instruct patient to call for assistance with activity   - Consult OT/PT to assist with strengthening/mobility   - Keep Call bell within reach  - Keep bed low and locked with side rails adjusted as appropriate  - Keep care items and personal belongings within reach  - Initiate and maintain comfort rounds  - Make Fall Risk Sign visible to staff  - Offer Toileting every 2 Hours, in advance of need  - Initiate/Maintain bed/chair alarm  - Obtain necessary fall risk management equipment: bed/chair alarm, call bell, gripper socks, walker, bedside commode  - Apply yellow socks and bracelet for high fall risk patients  - Consider moving patient to room near nurses station  Outcome: Progressing  Goal: Maintains/Returns to pre admission functional level  Description: INTERVENTIONS:  - Perform BMAT or MOVE assessment daily.   - Set and communicate daily mobility goal to care team and patient. - Collaborate with rehabilitation services on mobility goals if consulted  - Reposition patient every 2 hours.   - Dangle patient 3 times a day  - Stand patient 3 times a day  - Ambulate patient 3 times a day  - Out of bed to chair 3 times a day   - Out of bed for meals  - Out of bed for toileting  - Record patient progress and toleration of activity level   Outcome: Progressing     Problem: Prexisting or High Potential for Compromised Skin Integrity  Goal: Skin integrity is maintained or improved  Description: INTERVENTIONS:  - Identify patients at risk for skin breakdown  - Assess and monitor skin integrity  - Assess and monitor nutrition and hydration status  - Monitor labs   - Assess for incontinence   - Turn and reposition patient  - Assist with mobility/ambulation  - Relieve pressure over bony prominences  - Avoid friction and shearing  - Provide appropriate hygiene as needed including keeping skin clean and dry  - Evaluate need for skin moisturizer/barrier cream  - Collaborate with interdisciplinary team   - Patient/family teaching  - Consider wound care consult   Outcome: Progressing     Problem: RESPIRATORY - ADULT  Goal: Achieves optimal ventilation and oxygenation  Description: INTERVENTIONS:  - Assess for changes in respiratory status  - Assess for changes in mentation and behavior  - Position to facilitate oxygenation and minimize respiratory effort  - Oxygen administered by appropriate delivery if ordered   - Encourage broncho-pulmonary hygiene including cough, deep breathe, Incentive Spirometry, flutter valve  - Assess the need for suctioning and aspirate as needed  - Assess and instruct to report SOB or any respiratory difficulty  - Respiratory Therapy support as indicated  Outcome: Progressing     Problem: GENITOURINARY - ADULT  Goal: Urinary catheter remains patent  Description: INTERVENTIONS:  - Assess patency of urinary catheter  - Follow guidelines for intermittent irrigation of non-functioning urinary catheter  Outcome: Progressing

## 2023-08-14 NOTE — PLAN OF CARE
Problem: Potential for Falls  Goal: Patient will remain free of falls  Description: INTERVENTIONS:  - Educate patient/family on patient safety including physical limitations  - Instruct patient to call for assistance with activity   - Consult OT/PT to assist with strengthening/mobility   - Keep Call bell within reach  - Keep bed low and locked with side rails adjusted as appropriate  - Keep care items and personal belongings within reach  - Initiate and maintain comfort rounds  - Make Fall Risk Sign visible to staff  - Offer Toileting every 2 Hours, in advance of need  - Initiate/Maintain bed/chair alarm  - Obtain necessary fall risk management equipment: bed/chair alarm, call bell, gripper socks, walker, bedside commode  - Apply yellow socks and bracelet for high fall risk patients  - Consider moving patient to room near nurses station  Outcome: Progressing

## 2023-08-14 NOTE — ASSESSMENT & PLAN NOTE
Currently on pureed and nectar thick, but still with aspiration. · Awaiting formal speech note. · Goals of care done. Patient would like to remain full code. I also spoke to her daughter Kelly Kirby over the phone. I explained to her the rationale for this question. It is because our patient would like to continue eating puréed food even if it is at this point contraindicated. She continues to decline PEG tube placement (but stated that she remains open for discussion). Her daughter would speak to her at bedside today to assist us with her treatment plan. I explained to her that if she continues to eat puréed food, she is a high risk for aspiration which may lead to significant complications down the road. They will inform us once our patient elects to either proceed with PEG placement, or consider comfort measures with pleasure feeds.

## 2023-08-14 NOTE — ASSESSMENT & PLAN NOTE
Resolved    Recent Labs     08/13/23  0059 08/13/23  0548 08/13/23  1229 08/13/23  1743 08/13/23  2357 08/14/23  0549   POCGLU 106 164* 122 147* 94 106

## 2023-08-14 NOTE — ED PROCEDURE NOTE
PROCEDURE  CriticalCare Time    Performed by: Gurdeep Dillard DO  Authorized by: Gurdeep Dillard DO    Critical care provider statement:     Critical care time (minutes):  45    Critical care time was exclusive of:  Separately billable procedures and treating other patients and teaching time    Critical care was time spent personally by me on the following activities:  Blood draw for specimens, obtaining history from patient or surrogate, development of treatment plan with patient or surrogate, discussions with consultants, evaluation of patient's response to treatment, examination of patient, interpretation of cardiac output measurements, ordering and performing treatments and interventions, ordering and review of laboratory studies, ordering and review of radiographic studies, re-evaluation of patient's condition and review of old charts    I assumed direction of critical care for this patient from another provider in my specialty: no           Gurdeep Dillard DO  08/14/23 [id number]

## 2023-08-15 NOTE — ASSESSMENT & PLAN NOTE
Currently on pureed and nectar thick, but still with aspiration.   · Appreciate speech recommendations  · Patient agreeable to PEG tube placement today; will discuss with patient's daughter via phone  · Have consulted gastroenterology, patient n.p.o. at midnight  · Consult to nutrition

## 2023-08-15 NOTE — ASSESSMENT & PLAN NOTE
Acute respiratory failure with hypoxia secondary to aspiration pneumonitis.   · Continue daily weaning trials  · Albuterol PRN  · Patient reports she is on 2 L at home; currently on 4 L nasal cannula, improving

## 2023-08-15 NOTE — ASSESSMENT & PLAN NOTE
Altered mental status on presentation. Etiology possibly due to hypoglycemia, fatigue, underlying disease.   · Continue supportive care  · Today, appeared alert and oriented X3, improving

## 2023-08-15 NOTE — PLAN OF CARE
Problem: RESPIRATORY - ADULT  Goal: Achieves optimal ventilation and oxygenation  Description: INTERVENTIONS:  - Assess for changes in respiratory status  - Assess for changes in mentation and behavior  - Position to facilitate oxygenation and minimize respiratory effort  - Oxygen administered by appropriate delivery if ordered   - Encourage broncho-pulmonary hygiene including cough, deep breathe, Incentive Spirometry, flutter valve  - Assess the need for suctioning and aspirate as needed  - Assess and instruct to report SOB or any respiratory difficulty  - Respiratory Therapy support as indicated  Outcome: Progressing     Problem: Knowledge Deficit  Goal: Patient/family/caregiver demonstrates understanding of disease process, treatment plan, medications, and discharge instructions  Description: Complete learning assessment and assess knowledge base.   Interventions:  - Provide teaching at level of understanding  - Provide teaching via preferred learning methods  Outcome: Progressing

## 2023-08-15 NOTE — ASSESSMENT & PLAN NOTE
Resolved    Recent Labs     08/13/23  1743 08/13/23  2357 08/14/23  0549 08/14/23  1149 08/15/23  0546 08/15/23  1125   POCGLU 147* 94 106 228* 102 118

## 2023-08-15 NOTE — PROGRESS NOTES
[facility]  Progress Note  Name: Pamella Vera  MRN: [id number]  Unit/Bed#: E2 -01 I Date of Admission: 8/10/2023   Date of Service: 8/15/2023 I Hospital Day: 5    Assessment/Plan   Hypoglycemia  Assessment & Plan  Resolved    Recent Labs     08/13/23  1743 08/13/23  2357 08/14/23  0549 08/14/23  1149 08/15/23  0546 08/15/23  1125   POCGLU 147* 94 106 228* 102 118         Electrolyte imbalance  Assessment & Plan  Replete PRN    Recent Labs     08/14/23  0518 08/15/23  0446   K 3.6 4.0   MG 1.5* 1.8*         Closed T12 fracture (HCC)  Assessment & Plan  History of low impact fall that resulted in multiple T-spine fractures  · Receiving pain control with Morphine PRN  · Follows with neurosurgery in the outpatient setting    Dysphagia  Assessment & Plan  Currently on pureed and nectar thick, but still with aspiration. · Appreciate speech recommendations  · Patient agreeable to PEG tube placement today; will discuss with patient's daughter via phone  · Have consulted gastroenterology, patient n.p.o. at midnight  · Consult to nutrition    Altered mental status  Assessment & Plan  Altered mental status on presentation. Etiology possibly due to hypoglycemia, fatigue, underlying disease. · Continue supportive care  · Today, appeared alert and oriented X3, improving    History of stroke  Assessment & Plan    · Continue Eliquis    Rheumatoid arthritis of multiple sites with negative rheumatoid factor ([address])  Assessment & Plan  History of seronegative multiple joint arthropathy. · Continue Plaquenil    * Acute respiratory failure with hypoxia (HCC)  Assessment & Plan  Acute respiratory failure with hypoxia secondary to aspiration pneumonitis.   · Continue daily weaning trials  · Albuterol PRN  · Patient reports she is on 2 L at home; currently on 4 L nasal cannula, improving             VTE Pharmacologic Prophylaxis:   Pharmacologic: Apixaban (Eliquis)  Mechanical VTE Prophylaxis in Place: Yes    Patient Centered Rounds: I have performed bedside rounds with nursing staff today. Discussions with Specialists or Other Care Team Provider: Gastroenterology    Education and Discussions with Family / Patient: Discussed treatment plan with family and patient who agree with current plan; encouraged to ask questions and participate. Time Spent for Care: 45 minutes. More than 50% of total time spent on counseling and coordination of care as described above. Current Length of Stay: 5 day(s)    Current Patient Status: Inpatient   Certification Statement: The patient will continue to require additional inpatient hospital stay due to Of aspiration pneumonia and awaiting PEG tube placement. Discharge Plan: To be determined    Code Status: Level 1 - Full Code      Subjective:   Patient seen and examined bedside, no acute distress and appears to be at baseline mentation. On 4 L nasal cannula, she states that she was on 2 L earlier in the day, but recently transition back to 4 L. Respiratory requirement decreasing and patient appears to be overall improving. No need for antibiotics; nystatin swish and spit ordered with aspiration precautions for possible thrush. Patient informed primary team that she was interested in receiving PEG tube. Have discussed with GI, will consult them for placement tomorrow patient n.p.o. at midnight and nutrition consult made as well. Will discuss with patient's daughter Deena Huber. Otherwise, continue current therapy and monitor on morning labs. Objective:     Vitals:   Temp (24hrs), Av.4 °F (36.3 °C), Min:97.1 °F (36.2 °C), Max:97.8 °F (36.6 °C)    Temp:  [97.1 °F (36.2 °C)-97.8 °F (36.6 °C)] 97.1 °F (36.2 °C)  HR:  [] 112  Resp:  [18-20] 18  BP: (101-125)/(62-98) 111/76  SpO2:  [94 %-100 %] 99 %  Body mass index is 20.13 kg/m².      Input and Output Summary (last 24 hours):     No intake or output data in the 24 hours ending 08/15/23 9113    Physical Exam:     Physical Exam  Vitals and nursing note reviewed. Constitutional:       General: She is not in acute distress. Appearance: She is well-developed. HENT:      Head: Normocephalic and atraumatic. Eyes:      Conjunctiva/sclera: Conjunctivae normal.   Cardiovascular:      Rate and Rhythm: Normal rate and regular rhythm. Heart sounds: No murmur heard. Pulmonary:      Effort: Pulmonary effort is normal. No respiratory distress. Breath sounds: Normal breath sounds. Abdominal:      Palpations: Abdomen is soft. Tenderness: There is no abdominal tenderness. Musculoskeletal:         General: No swelling. Cervical back: Neck supple. Comments: Kyphotic   Skin:     General: Skin is warm and dry. Neurological:      Mental Status: She is alert. Comments: Dysphonia; left-sided weakness   Psychiatric:         Mood and Affect: Mood normal.           Additional Data:     Labs:    Results from last 7 days   Lab Units 08/15/23  0446   WBC Thousand/uL 10.35*   HEMOGLOBIN g/dL 9.4*   HEMATOCRIT % 30.3*   PLATELETS Thousands/uL 266   NEUTROS PCT % 77*   LYMPHS PCT % 13*   MONOS PCT % 9   EOS PCT % 0     Results from last 7 days   Lab Units 08/15/23  0446 08/11/23  0508 08/10/23  1558   SODIUM mmol/L 141   < > 141   POTASSIUM mmol/L 4.0   < > 3.7   CHLORIDE mmol/L 107   < > 110*   CO2 mmol/L 25   < > 23   BUN mg/dL 12   < > 13   CREATININE mg/dL 0.60   < > 0.91   ANION GAP mmol/L 9   < > 8   CALCIUM mg/dL 8.0*   < > 7.9*   ALBUMIN g/dL  --   --  2.8*   TOTAL BILIRUBIN mg/dL  --   --  0.63   ALK PHOS U/L  --   --  65   ALT U/L  --   --  15   AST U/L  --   --  23   GLUCOSE RANDOM mg/dL 100   < > 210*    < > = values in this interval not displayed.      Results from last 7 days   Lab Units 08/10/23  1503   INR  1.34*     Results from last 7 days   Lab Units 08/15/23  1125 08/15/23  0546 08/14/23  1149 08/14/23  0549 08/13/23  2357 08/13/23  1743 08/13/23  1229 08/13/23  0548 08/13/23  0059 08/12/23  1745 08/12/23  0645 08/12/23  0013   POC GLUCOSE mg/dl 118 102 228* 106 94 147* 122 164* 106 123 100 155*         Results from last 7 days   Lab Units 08/15/23  0446 08/11/23  0508 08/10/23  1710 08/10/23  1503   LACTIC ACID mmol/L  --   --  2.0 2.7*   PROCALCITONIN ng/ml 0.08 0.08  --  0.07           * I Have Reviewed All Lab Data Listed Above. * Additional Pertinent Lab Tests Reviewed: All Labs Within Last 24 Hours Reviewed    Imaging:    Imaging Reports Reviewed Today Include:   Imaging Personally Reviewed by Myself Includes:      Recent Cultures (last 7 days):     Results from last 7 days   Lab Units 08/11/23  0813 08/10/23  1504 08/10/23  1503   BLOOD CULTURE   --  No Growth After 4 Days. No Growth After 4 Days.    LEGIONELLA URINARY ANTIGEN  Negative  --   --        Last 24 Hours Medication List:   Current Facility-Administered Medications   Medication Dose Route Frequency Provider Last Rate   • acetaminophen  650 mg Oral Q6H [address] Lianet Marroquin PA-C     • albuterol  2 puff Inhalation Q6H PRN Lianet Marroquin PA-C     • aluminum-magnesium hydroxide-simethicone  30 mL Oral Q4H PRN Ila Nolasco PA-C     • apixaban  5 mg Oral BID Lianet Marroquin PA-C     • buPROPion  50 mg Oral BID Lianet Marroquin PA-C     • cinacalcet  30 mg Oral Daily With Breakfast Lianet Marroquin PA-C     • docusate sodium  100 mg Oral BID Lianet Marroquin PA-C     • DULoxetine  60 mg Oral BID Lianet Marroquin PA-C     • hydroxychloroquine  200 mg Oral BID With Meals Angel Ulrich MD     • metoprolol succinate  25 mg Oral Daily Lianet Marroquin PA-C     • morphine  7.5 mg Oral Q6H PRN Angel Ulrich MD     • nystatin  500,000 Units Swish & Spit 4x Daily Matteo Cotter MD     • omeprazole (PRILOSEC) suspension 2 mg/mL  40 mg Oral Early Morning Lianet Marroquin PA-C     • potassium phosphate  12 mmol Intravenous Once Matteo Cotter MD 12 mmol (08/15/23 8180)   • senna  8.6 mg Oral BID Kim Noble PA-C Today, Patient Was Seen By: Parker Card MD    ** Please Note: Dictation voice to text software may have been used in the creation of this document.  **

## 2023-08-15 NOTE — CASE MANAGEMENT
Case Management Assessment & Discharge Planning Note    Patient name Meg Boyle  Location [facility] 2 /E2 MS 36-* MRN [id number]  : 1955 Date 8/15/2023       Current Admission Date: 8/10/2023  Current Admission Diagnosis:Acute respiratory failure with hypoxia [facility])   Patient Active Problem List    Diagnosis Date Noted   • Electrolyte imbalance 2023   • Hypoglycemia 2023   • Closed T12 fracture ([address]) 08/10/2023   • Compression fracture of body of thoracic vertebra (HCC) 08/10/2023   • Sepsis ([address]) 08/10/2023   • Acute respiratory failure with hypoxia ([address]) 08/10/2023   • Chronic combined systolic and diastolic heart failure ([address]) 08/10/2023   • Fall 2023   • Cervical dystonia 2023   • Sundowning 2023   • Night terrors 2023   • Leg hematoma, left, initial encounter 2023   • Cerebral aneurysm, nonruptured 05/15/2023   • Nocturnal hypoxia 2023   • Insomnia 2023   • Sinus tachycardia 2023   • Altered mental status 2023   • Gout of foot 2023   • Dysphagia 2023   • Wound of left ankle 2023   • Urinary tract infection without hematuria 2023   • Obstructive sleep apnea 2023   • History of stroke 2023   • History of bacteremia 2023   • Acute CVA (cerebrovascular accident) ([address]) 2023   • Hypercalcemia 2023   • Discoloration of skin of foot 2023   • HFrEF (heart failure with reduced ejection fraction) ([address]) 2023   • Stroke ([address]) 2023   • HTN (hypertension) 2023   • Cardiomyopathy ([address]) 2023   • Vitamin D insufficiency 2023   • LAILA (acute kidney injury) ([address]) 2022   • Polypharmacy 2022   • 'light-for-dates' infant with signs of fetal malnutrition 2022   • Bipolar depression ([address]) 2022   • Rheumatoid arthritis of multiple sites with negative rheumatoid factor ([address]) 2022   • Closed fracture of multiple ribs of right side 03/02/2022   • Traumatic pneumothorax 03/02/2022   • Closed fracture of transverse process of lumbar vertebra ([address]) 03/02/2022   • Liver contusion 03/02/2022   • Acute pain due to trauma 03/02/2022   • Acute pain of right shoulder 03/02/2022   • Severe protein-calorie malnutrition ([address]) 03/02/2022   • Undifferentiated connective tissue disease ([address]) 12/29/2021   • Primary generalized (osteo)arthritis 12/29/2021   • Peripheral neuropathy 12/29/2021   • Osteoporosis 12/29/2021   • Hx of compression fracture of spine 12/29/2021   • Lumbar spondylosis 12/29/2021   • Cervical spondylosis 12/29/2021   • Anemia 06/26/2021   • Chronic pain syndrome    • Ambulatory dysfunction    • Acute metabolic encephalopathy    • Seronegative arthropathy of multiple sites (HCC)    • Gastroesophageal reflux disease without esophagitis    • Mixed stress and urge urinary incontinence    • CHF (congestive heart failure) ([address])       LOS (days): 5  Geometric Mean LOS (GMLOS) (days): 5.00  Days to GMLOS:0.4     OBJECTIVE:    Risk of Unplanned Readmission Score: 51.27         Current admission status: Inpatient       Preferred Pharmacy:   [facility]  [address] [address]  Phone: [phone] Fax: [address], [address] [place] [address] [address] [facility] [address]  Phone: [phone] Fax: [phone]    Primary Care Provider: Sabrina Anand DO    Primary Insurance: MEDICARE  Secondary Insurance: AARP    ASSESSMENT:  [address] Representative - Daughter   Primary Phone: [phone] (Mobile)                              Patient Information  Admitted from[de-identified] Home  Mental Status: Alert  During Assessment patient was accompanied by: Daughter  Assessment information provided by[de-identified] Daughter, Patient  Primary Caregiver: Self  Support Systems: Self, Family members  [facility]: [address] [facility] do you live in?: [address] [facility] entry access options. Select all that apply.: Stairs  Number of steps to enter home. : 1  Do the steps have railings?: No  Type of Current Residence: [address] [place] [address] home  Upon entering residence, is there a bedroom on the main floor (no further steps)?: Yes  Upon entering residence, is there a bathroom on the main floor (no further steps)?: Yes  In the last 12 months, was there a time when you were not able to pay the mortgage or rent on time?: No  In the last 12 months, how many places have you lived?: 1  In the last 12 months, was there a time when you did not have a steady place to sleep or slept in a shelter (including now)?: No  Homeless/housing insecurity resource given?: N/A  Living Arrangements: Lives w/ Son    Activities of Daily Living Prior to Admission  Functional Status: Independent  Completes ADLs independently?: Yes (pt does requires assistance with ADLs and IADLs at times which family is able to provide assistance for)  Ambulates independently?: Yes (w/ assistive device)  Does patient use assisted devices?: Yes  Assisted Devices (DME) used:  Wheelchair, Driscilla Claw, Shower Chair  Does patient currently own DME?: Yes  What DME does the patient currently own?: Wheelchair, Driscilla Claw, [employer]  Does the patient have a history of Short-Term Rehab?: Yes (TCF and ARC)  Does patient have a history of HHC?: Yes  Does patient currently have [address] [facility]?: No         Patient Information Continued  Income Source: SSI/SSD  Does patient have prescription coverage?: Yes  Within the past 12 months, you worried that your food would run out before you got the money to buy more.: Never true  Within the past 12 months, the food you bought just didn't last and you didn't have money to get more.: Never true  Food insecurity resource given?: N/A  Does patient receive dialysis treatments?: No  Does patient have a history of substance abuse?: No  Does patient have a history of Mental Health Diagnosis?: No         Means of Transportation  Means of Transport to Appts[de-identified] Family transport  In the past 12 months, has lack of transportation kept you from medical appointments or from getting medications?: No  In the past 12 months, has lack of transportation kept you from meetings, work, or from getting things needed for daily living?: No  Was application for public transport provided?: N/A        DISCHARGE DETAILS:    Discharge planning discussed with[de-identified] Patient and daughter        CM contacted family/caregiver?: Yes             Contacts  Patient Contacts: Adrianne Nj  Relationship to Patient[de-identified] Family  Contact Method: In Person  Reason/Outcome: Continuity of Care                                                                     Additional Comments: CM met with pt and daughter, Heath Scanlon, at the bedside. Pt is planned for PEG tube placement. CM reviewed with daughter and pt that once PEG is placed and pt is at the home regimen of her tube feed, this CM would order all needed supplies for pt to transition home. Also explained that a VN would come to the home to help the family learn the tube feed and assist in ordering supplies as needed. Pt would also like to receive therapy in the home if she is to go home at time of discharge. CM had conversation about rehab if it were to be recommended. Daughter and pt would like to have the conversation if it is recommended but would really like to get the pt home. CM reached out to MD for PT/OT orders.

## 2023-08-15 NOTE — ASSESSMENT & PLAN NOTE
History of low impact fall that resulted in multiple T-spine fractures  · Receiving pain control with Morphine PRN  · Follows with neurosurgery in the outpatient setting

## 2023-08-15 NOTE — PLAN OF CARE
Problem: PAIN - ADULT  Goal: Verbalizes/displays adequate comfort level or baseline comfort level  Description: Interventions:  - Encourage patient to monitor pain and request assistance  - Assess pain using appropriate pain scale  - Administer analgesics based on type and severity of pain and evaluate response  - Implement non-pharmacological measures as appropriate and evaluate response  - Consider cultural and social influences on pain and pain management  - Notify physician/advanced practitioner if interventions unsuccessful or patient reports new pain  Outcome: Progressing     Problem: SAFETY ADULT  Goal: Patient will remain free of falls  Description: INTERVENTIONS:  - Educate patient/family on patient safety including physical limitations  - Instruct patient to call for assistance with activity   - Consult OT/PT to assist with strengthening/mobility   - Keep Call bell within reach  - Keep bed low and locked with side rails adjusted as appropriate  - Keep care items and personal belongings within reach  - Initiate and maintain comfort rounds  - Make Fall Risk Sign visible to staff  - Offer Toileting every 2 Hours, in advance of need  - Initiate/Maintain bed/chair alarm  - Obtain necessary fall risk management equipment: bed/chair alarm, call bell, gripper socks, walker, bedside commode  - Apply yellow socks and bracelet for high fall risk patients  - Consider moving patient to room near nurses station  Outcome: Progressing  Goal: Maintain or return to baseline ADL function  Description: INTERVENTIONS:  - Educate patient/family on patient safety including physical limitations  - Instruct patient to call for assistance with activity   - Consult OT/PT to assist with strengthening/mobility   - Keep Call bell within reach  - Keep bed low and locked with side rails adjusted as appropriate  - Keep care items and personal belongings within reach  - Initiate and maintain comfort rounds  - Make Fall Risk Sign visible to staff  - Offer Toileting every 2 Hours, in advance of need  - Initiate/Maintain bed/chair alarm  - Obtain necessary fall risk management equipment: bed/chair alarm, call bell, gripper socks, walker, bedside commode  - Apply yellow socks and bracelet for high fall risk patients  - Consider moving patient to room near nurses station  Outcome: Progressing  Goal: Maintains/Returns to pre admission functional level  Description: INTERVENTIONS:  - Perform BMAT or MOVE assessment daily.   - Set and communicate daily mobility goal to care team and patient. - Collaborate with rehabilitation services on mobility goals if consulted  - Reposition patient every 2 hours.   - Dangle patient 3 times a day  - Stand patient 3 times a day  - Ambulate patient 3 times a day  - Out of bed to chair 3 times a day   - Out of bed for meals  - Out of bed for toileting  - Record patient progress and toleration of activity level   Outcome: Progressing     Problem: Prexisting or High Potential for Compromised Skin Integrity  Goal: Skin integrity is maintained or improved  Description: INTERVENTIONS:  - Identify patients at risk for skin breakdown  - Assess and monitor skin integrity  - Assess and monitor nutrition and hydration status  - Monitor labs   - Assess for incontinence   - Turn and reposition patient  - Assist with mobility/ambulation  - Relieve pressure over bony prominences  - Avoid friction and shearing  - Provide appropriate hygiene as needed including keeping skin clean and dry  - Evaluate need for skin moisturizer/barrier cream  - Collaborate with interdisciplinary team   - Patient/family teaching  - Consider wound care consult   Outcome: Progressing     Problem: RESPIRATORY - ADULT  Goal: Achieves optimal ventilation and oxygenation  Description: INTERVENTIONS:  - Assess for changes in respiratory status  - Assess for changes in mentation and behavior  - Position to facilitate oxygenation and minimize respiratory effort  - Oxygen administered by appropriate delivery if ordered   - Encourage broncho-pulmonary hygiene including cough, deep breathe, Incentive Spirometry, flutter valve  - Assess the need for suctioning and aspirate as needed  - Assess and instruct to report SOB or any respiratory difficulty  - Respiratory Therapy support as indicated  Outcome: Progressing

## 2023-08-16 ENCOUNTER — ANESTHESIA EVENT (INPATIENT)
Dept: GASTROENTEROLOGY | Facility: HOSPITAL | Age: 68
End: 2023-08-16
Payer: MEDICARE

## 2023-08-16 NOTE — PHYSICAL THERAPY NOTE
PT EVALUATION    Pt. Name: Chaya Quezada  Pt. Age: 70 y.o. MRN: [id number]  LENGTH OF STAY: 6      Admitting Diagnoses: Altered mental state [R41.82]  Respiratory distress [R06.03]  Hypertension [I10]  Encephalopathy [G93.40]  History of CVA (cerebrovascular accident) [Z86.73]  Acute respiratory failure with hypoxia ([address]) [J96.01]  Sepsis ([address]) [A41.9]    Past Medical History:   Diagnosis Date   • Anxiety    • Asthma    • Bipolar depression ([address])    • Chronic narcotic dependence ([address])    • Chronic pain    • Depression    • Enterovirus heart infection    • SHAR (obstructive sleep apnea)    • Osteoarthritis    • Peripheral neuropathy    • Plantar fasciitis of right foot    • Senile osteoporosis    • Seronegative arthropathy of multiple sites ([address])    • Undifferentiated connective tissue disease ([address])        Past Surgical History:   Procedure Laterality Date   • BREAST IMPLANT     • FIXATION KYPHOPLASTY LUMBAR SPINE     • HYSTERECTOMY     • INCONTINENCE SURGERY N/A    • IR STROKE ALERT  2/12/2023   • NASAL SEPTOPLASTY W/ TURBINOPLASTY N/A    • RECTAL PROLAPSE REPAIR N/A    • REPAIR RECTOCELE     • RHINOPLASTY N/A        Imaging Studies:  FL barium swallow video w speech   Final Result by JAMI HARKINS (08/13 1129)      CT head without contrast   Final Result by Beckie Borja MD (08/10 2009)   Suboptimal positioning. No acute intracranial abnormality. Workstation performed: [id number]         CT chest abdomen pelvis wo contrast   Final Result by Beckie Boraj MD (08/10 2028)         Images are motion affected; the right lower lobe main bronchus appears occluded (2:48). The right lower lung remains aerated. Bibasilar nodular infiltrates are noted. . Recommend pulmonary consultation and follow-up to resolution. Small to moderate left pleural effusion.  There appears to be a split pleura sign however evaluation is limited due to the lack of IV contrast. Recommend clinical correlation. .      The study was marked in EPIC for immediate notification. Workstation performed: [id number]         XR chest portable   ED Interpretation by Nayeli Hart DO (08/10 1809)   Wet read - low clavicles, poor inspiratory effort, no effusions PNA or PTX      Final Result by Landon Stein MD (08/11 1446)      Minimal bibasilar subsegmental atelectasis. Small left pleural effusion. Workstation performed: [id number]               08/16/23 1212   PT Last Visit   PT Visit Date 08/16/23   Note Type   Note type Evaluation   Pain Assessment   Pain Score 7   Pain Location/Orientation Orientation: Lower; Location: Back   Hospital Pain Intervention(s) Repositioned; Ambulation/increased activity; Emotional support; Rest   Restrictions/Precautions   Braces or Orthoses LSO  (was issued at [facility])   Other Precautions Chair Alarm; Bed Alarm; Fall Risk;Pain;Spinal precautions; contact precautions   Home Living   Type of [address] [place] [address] Two level; Able to live on main level with bedroom/bathroom; Performs ADLs on one level;Stairs to enter with rails  (1STE)   [address] chair   Home Equipment Walker; Wheelchair-manual   Additional Comments pt has 1st flr set up   Prior Function   Level of [place] Needs assistance with ADLs; Needs assistance with functional mobility; Needs assistance with IADLS  (w/ RW)   Lives With Son   Receives Help From [facility] in the last 6 months 1 to 4  (3x)   Comments pt reports requiring assistance w/ amb w/ RW, ADL's & IADL's at baseline; (-) home alone   General   Additional Pertinent History hx of stroke s/p L mca thrombectomy in 2/23; closed T12 fx with LSO brace, chronic T9 & T11 fxs, kyphosis; s/p fall on 6/30; arc admission on 7/7/23   Family/Caregiver Present No   Cognition   Arousal/Participation Alert   Orientation Level Oriented to person;Oriented to place;Oriented to time   Following Commands Follows one step commands without difficulty   Comments pleasant & cooperative   Subjective   Subjective Pt agreeable to PT/OT evals. RUE Assessment   RUE Assessment   (refer to OT)   LUE Assessment   LUE Assessment   (refer to OT)   RLE Assessment   RLE Assessment WFL  (3+/5 grossly except hip 3/5)   LLE Assessment   LLE Assessment WFL  (3+/5 grossly except hip 3/5)   Bed Mobility   Supine to Sit Unable to assess  (pt seated EOB upon my arrival)   Sit to Supine 4  Minimal assistance   Additional items Assist x 1; Increased time required;Verbal cues;LE management   Additional Comments cues for techniques & safety; cues for log rolling technique; pt wearing LSO upon my arrival   Transfers   Sit to Stand 4  Minimal assistance   Additional items Assist x 1; Increased time required;Verbal cues   Stand to Sit 4  Minimal assistance   Additional items Assist x 1; Increased time required;Verbal cues   Additional Comments cues for techniques & safety   Ambulation/Elevation   Gait pattern Forward Flexion; Wide AYLA; Decreased foot clearance;Shuffling; Short stride; Excessively slow  (kyphotic posture)   Gait Assistance 4  Minimal assist   Additional items Assist x 1;Verbal cues; Tactile cues   Assistive Device Rolling walker   Distance 20'x1   Ambulation/Elevation Additional Comments unsteady gait but no gross LOB noted; dec amb tolerance 2* to pain & fatigue   Balance   Static Sitting Fair +   Dynamic Sitting Fair   Static Standing Fair -   Dynamic Standing Poor +   Ambulatory Poor +   Endurance Deficit   Endurance Deficit Yes   Endurance Deficit Description pain; fatigue   Activity Tolerance   Activity Tolerance Patient limited by fatigue;Patient limited by pain;Treatment limited secondary to medical complications (Comment)   Medical Staff Made Aware OTR [place]   Nurse Made Aware yes   Assessment   Prognosis Fair   Problem List Decreased strength;Decreased endurance; Impaired balance;Decreased mobility;Pain;Orthopedic restrictions   Assessment Pt. 70 y. o.female presented via EMS for altered mental status & hypoxia. Pt admitted for Acute respiratory failure with hypoxia (HCC) w/ sepsis, hypoglycemia & electrolyte imbalance. Pt w/ h/o stroke s/p L mca thrombectomy in 5/20//23; closed T12 fx with LSO brace; chronic T9 & T11 fxs; kyphosis. Pt referred to PT for mobility assessment & D/C planning w/ orders of OOB to chair. Please see above for information re: home set-up & PLOF as well as objective findings during PT assessment. PTA, pt reports being (A) w/ ADL's and (A) w/ amb w/ RW. On eval, pt functioning below baseline hence will continue skilled PT to improve function & safety. Upon arrival, pt wearing LSO, seated at EOB w/ PCA. Pt require minAx1 for most functional mobility w/ RW + cues for techniques & safety. Gait deviations as above, slow w/ dec foot clearnace & strides but no gross LOB noted. Dec amb tolerance 2* to pain & fatigue. The patient's AM-PAC Basic Mobility Inpatient Short Form Raw Score is 17. A Raw score of greater than 16 suggests the patient may benefit from discharge to home. Please also refer to the recommendation of the Physical Therapist for safe discharge planning. From PT standpoint, will anticipate safe return to home w/ family support when medically cleared. Will recommend HHPT & inc family support at D/C. No SOB & dizziness reported t/o session. Nsg staff most recent vital signs as follows: /78 (BP Location: Right arm)   Pulse 80   Temp 98.7 °F (37.1 °C) (Temporal)   Resp 19   Ht 5' 4" (1.626 m)   Wt 53.5 kg (117 lb 15.1 oz)   SpO2 97%   BMI 20.25 kg/m² . At end of session, pt back in bed in stable condition, call bell & phone in reach, bed alarm activated. Fall precautions reinforced w/ good understanding. CM to follow. Nsg staff to continue to mobilized pt (OOB in chair for all meals & ambulate in room/unit) as tolerated to prevent further decline in function. Nsg notified.  Will also recommend restorative for daily amb. Co-eval was necessary to complete this PT eval for the pt's best interest given pt's medical acuity & complexity. Goals   Patient Goals to get better   STG Expiration Date 08/30/23   Short Term Goal #1 Goals to be met in 14 days; pt will be able to: 1) inc strength & balance by 1/2 grade to improve overall functional mobility & dec fall risk; 2) inc bed mobility to modified I for pt to be able to get in/OOB safely w/ proper techniques 100% of the time, to dec caregiver burden & safely function at home; 3) inc transfers to modified I for pt to transition safely from one surface to another w/o % of the time, to dec caregiver burden & safely function at home; 4) inc amb w/ RW approx. >80' w/ S for pt to ambulate household distances w/o any % of the time, to dec caregiver burden & safely function at home; 5) negotiate stairs w/ S for inc safety during stair mgt inside/outside of home & dec caregiver burden; 6) pt/caregiver ed   PT Treatment Day 0   Plan   Treatment/Interventions Functional transfer training;LE strengthening/ROM; Elevations; Therapeutic exercise; Endurance training;Patient/family training;Bed mobility;Gait training;Spoke to nursing;OT   PT Frequency 3-5x/wk   Recommendation   PT Discharge Recommendation Home with home health rehabilitation  (w/ inc family support)   Equipment Recommended Walker  (pt has)   Additional Comments restorative for daily amb   AM-PAC Basic Mobility Inpatient   Turning in Flat Bed Without Bedrails 3   Lying on Back to Sitting on Edge of Flat Bed Without Bedrails 3   Moving Bed to Chair 3   Standing Up From Chair Using Arms 3   Walk in Room 3   Climb 3-5 Stairs With Railing 2   Basic Mobility Inpatient Raw Score 17   Basic Mobility Standardized Score 39.67   Highest Level Of Mobility   JH-HLM Goal 5: Stand one or more mins   JH-HLM Achieved 6: Walk 10 steps or more   End of Consult   Patient Position at End of Consult Supine;Bed/Chair alarm activated; All needs within reach   End of Consult Comments Pt in stable condition. All needs in reach. Bed alarm activated.    Hx/personal factors: co-morbidities, inaccessible home, advanced age, use of AD, pain, h/o of falls, fall risk, assist w/ ADL's, and spinal precuations; LSO  Examination: dec mobility, dec balance, dec endurance, dec amb, risk for falls, pain  Clinical: unpredictable (ongoing medical status, abnormal lab values, risk for falls, and pain mgt)  Complexity: high    [employer]

## 2023-08-16 NOTE — PLAN OF CARE
Problem: PAIN - ADULT  Goal: Verbalizes/displays adequate comfort level or baseline comfort level  Description: Interventions:  - Encourage patient to monitor pain and request assistance  - Assess pain using appropriate pain scale  - Administer analgesics based on type and severity of pain and evaluate response  - Implement non-pharmacological measures as appropriate and evaluate response  - Consider cultural and social influences on pain and pain management  - Notify physician/advanced practitioner if interventions unsuccessful or patient reports new pain  Outcome: Progressing     Problem: INFECTION - ADULT  Goal: Absence or prevention of progression during hospitalization  Description: INTERVENTIONS:  - Assess and monitor for signs and symptoms of infection  - Monitor lab/diagnostic results  - Monitor all insertion sites, i.e. indwelling lines, tubes, and drains  - Monitor endotracheal if appropriate and nasal secretions for changes in amount and color  - [place] appropriate cooling/warming therapies per order  - Administer medications as ordered  - Instruct and encourage patient and family to use good hand hygiene technique  - Identify and instruct in appropriate isolation precautions (contact isolation for CRE) for identified infection/condition  Outcome: Progressing     Problem: SAFETY ADULT  Goal: Patient will remain free of falls  Description: INTERVENTIONS:  - Educate patient/family on patient safety including physical limitations  - Instruct patient to call for assistance with activity   - Consult OT/PT to assist with strengthening/mobility   - Keep Call bell within reach  - Keep bed low and locked with side rails adjusted as appropriate  - Keep care items and personal belongings within reach  - Initiate and maintain comfort rounds  - Make Fall Risk Sign visible to staff  - Offer Toileting every 2 Hours, in advance of need  - Initiate/Maintain bed/chair alarm  - Obtain necessary fall risk management equipment: bed/chair alarm, call bell, gripper socks, walker, bedside commode  - Apply yellow socks and bracelet for high fall risk patients  - Consider moving patient to room near nurses station  Outcome: Progressing     Problem: Nutrition/Hydration-ADULT  Goal: Nutrient/Hydration intake appropriate for improving, restoring or maintaining nutritional needs  Description: Monitor and assess patient's nutrition/hydration status for malnutrition. Collaborate with interdisciplinary team and initiate plan and interventions as ordered. Monitor patient's weight and dietary intake as ordered or per policy. Utilize nutrition screening tool and intervene as necessary. Determine patient's food preferences and provide high-protein, high-caloric foods as appropriate.      INTERVENTIONS:  - Monitor oral intake, urinary output, labs, and treatment plans  - Assess nutrition and hydration status and recommend course of action  - Evaluate amount of meals eaten  - Do not leave patient alone while eating due to aspiration risk  - Assist patient with eating if necessary   - Allow adequate time for meals  - Recommend/ encourage appropriate diets, oral nutritional supplements, and vitamin/mineral supplements  - Order, calculate, and assess calorie counts as needed  - Assess need for intravenous fluids  - Provide specific nutrition/hydration education as appropriate  - Include patient in decisions related to nutrition  Outcome: Progressing     Problem: RESPIRATORY - ADULT  Goal: Achieves optimal ventilation and oxygenation  Description: INTERVENTIONS:  - Assess for changes in respiratory status  - Assess for changes in mentation and behavior  - Position to facilitate oxygenation and minimize respiratory effort  - Oxygen administered by appropriate delivery if ordered   - Encourage broncho-pulmonary hygiene including cough, deep breathe, Incentive Spirometry, flutter valve  - Assess the need for suctioning and aspirate as needed  - Assess and instruct to report SOB or any respiratory difficulty  - Respiratory Therapy support as indicated  Outcome: Progressing     Problem: GENITOURINARY - ADULT  Goal: Maintains or returns to baseline urinary function  Description: INTERVENTIONS:  - Assess urinary function  - Encourage oral fluids to ensure adequate hydration if ordered  - Administer IV fluids as ordered to ensure adequate hydration  - Administer ordered medications as needed  - Offer frequent toileting  - Follow urinary retention protocol if ordered  Outcome: Progressing

## 2023-08-16 NOTE — CONSULTS
Consult Service: Gastroenterology      PATIENT INFORMATION      Vera Dave 70 y.o. female MRN: [id number]  Unit/Bed#: E2 -01 Encounter: [id number]  PCP: Merissa Sorenson DO  Date of Admission:  8/10/2023  Date of Consultation: 08/16/23  Requesting Physician: Nida Ferraro MD       [address]   Vera Dvae is a 70 y.o. old female with PMH of RA, SHAR on 2 L NC nocturnally, hx of CVA w mechanical thrombectomy 5/2023 on apixaban 5 mg BID presenting with recurrent aspiration events, with GI consulted for consideration of feeding tube placement. Dysphagia  • Appropriate candidate for PEG tube placement  • Last dose of apixaban 8/15/23 at 5 PM  • Currently on room air during the day and normal nocturnal O2 requirement (2 L NC)  • NPO past midnight  • Plan for PEG tube placement w EGD on 8/17/23    REASON FOR CONSULTATION      Dysphagia    HISTORY OF PRESENT ILLNESS      Vera Dave is a 70 y.o. female with PMH of RA, SHAR on 2 L NC nocturnally, hx of CVA w mechanical thrombectomy 5/2023 on apixaban 5 mg BID presenting with recurrent aspiration events, with GI consulted for consideration of feeding tube placement. Pt has had episodes of regurgitation and dysphagia. Evaluated by speech prior to this admission, and noted to have episodes of silent aspiration on VBS, suspected to have moderate-severe pharyngeal dysphagia with component of esophageal dysfunction and "tortuous esophagus" noted. Was recommended for conservative diet with aspiration precautions on 8/11/23, but still with episodes of aspiration subsequently. Pt had been offered feeding tube and initially was not amenable, but is now amenable. REVIEW OF SYSTEMS     A thorough 12-point review of systems has been conducted. Pertinent positives and negatives are mentioned in the history of present illness.        PAST MEDICAL & SURGICAL HISTORY      Past Medical History:   Diagnosis Date   • Anxiety    • Asthma    • Bipolar depression ([address])    • Chronic narcotic dependence ([address])    • Chronic pain    • Depression    • Enterovirus heart infection    • SHAR (obstructive sleep apnea)    • Osteoarthritis    • Peripheral neuropathy    • Plantar fasciitis of right foot    • Senile osteoporosis    • Seronegative arthropathy of multiple sites [facility])    • Undifferentiated connective tissue disease ([address])        Past Surgical History:   Procedure Laterality Date   • BREAST IMPLANT     • FIXATION KYPHOPLASTY LUMBAR SPINE     • HYSTERECTOMY     • INCONTINENCE SURGERY N/A    • IR STROKE ALERT  2/12/2023   • NASAL SEPTOPLASTY W/ TURBINOPLASTY N/A    • RECTAL PROLAPSE REPAIR N/A    • REPAIR RECTOCELE     • RHINOPLASTY N/A          MEDICATIONS & ALLERGIES       Medications:   Prior to Admission medications    Medication Sig Start Date End Date Taking? Authorizing Provider   acetaminophen (TYLENOL) 325 mg tablet Take 2 tablets (650 mg total) by mouth every 6 (six) hours 7/7/23   Otto Lazcano PA-C   albuterol (PROVENTIL HFA,VENTOLIN HFA) 90 mcg/act inhaler Inhale 2 puffs every 6 (six) hours as needed for wheezing 4/10/23   Danny Dennison MD   apixaban (ELIQUIS) 5 mg Take 1 tablet (5 mg total) by mouth 2 (two) times a day 7/28/23   Ane Records, MD   buPROPion [facility]) 100 mg tablet Take 1 tablet (100 mg total) by mouth 2 (two) times a day 7/28/23   Ane Records, MD   cinacalcet (SENSIPAR) 30 mg tablet Take 1 tablet (30 mg total) by mouth daily with breakfast 7/28/23   Ane Records, MD   Diclofenac Sodium (VOLTAREN) 1 % Apply 2 g topically 4 (four) times a day as needed (Aching/joint pain) To neck/R shoulder.  7/28/23   Ane Records, MD   docusate sodium (COLACE) 100 mg capsule Take 1 capsule (100 mg total) by mouth 2 (two) times a day 7/28/23   Ane Records, MD   DULoxetine (CYMBALTA) 60 mg delayed release capsule Take 1 capsule (60 mg total) by mouth 2 (two) times a day 7/28/23   Ane Records, MD   gabapentin (NEURONTIN) 300 mg capsule Take 1 capsule (300 mg total) by mouth 2 (two) times a day 7/28/23   Tristan Peng MD   hydroxychloroquine (PLAQUENIL) 200 mg tablet Take 1 tablet (200 mg total) by mouth 2 (two) times a day with meals 7/28/23 1/24/24  Tristan Peng MD   lamoTRIgine (LaMICtal) 150 MG tablet Take 1 tablet (150 mg total) by mouth daily at bedtime pt taking 150mg daily at bedtime 7/28/23   Tristan Peng MD   lidocaine (LIDODERM) 5 % Apply 2 patches topically over 12 hours daily at bedtime On for 12 hours, Off for 12 hours - To L outer hip and or back 7/28/23   Tristan Peng MD   magnesium oxide (MAG-OX) 400 mg tablet Take 1 tablet (400 mg total) by mouth 2 (two) times a day 7/28/23   Tristan Peng MD   melatonin 3 mg Take 1 tablet (3 mg total) by mouth daily at bedtime 7/28/23   Tristan Peng MD   metoprolol succinate (TOPROL-XL) 25 mg 24 hr tablet Take 1 tablet (25 mg total) by mouth daily 7/28/23 1/18/25  Tristan Peng MD   morphine (MSIR) 15 mg tablet Take 0.5 tablets (7.5 mg total) by mouth 2 (two) times a day as needed for severe pain or moderate pain Max Daily Amount: 15 mg 7/28/23   Tristan Peng MD   pantoprazole (PROTONIX) 40 mg tablet Take 1 tablet (40 mg total) by mouth daily in the early morning 7/28/23   Tristan Peng MD   senna (SENOKOT) 8.6 mg Take 1 tablet (8.6 mg total) by mouth 2 (two) times a day 7/28/23   Tristan Peng MD       Allergies:    Allergies   Allergen Reactions   • Cephalosporins Other (See Comments)     rash   • Iodine - Food Allergy Anaphylaxis     Anaphylaxis     • Methocarbamol Hives   • Sulfa Antibiotics Hives   • Bactrim [Sulfamethoxazole-Trimethoprim] Hives   • Erythromycin Hives   • Penicillins    • Shrimp Extract Allergy Skin Test - Food Allergy Other (See Comments)   • Trimethoprim Hives         SOCIAL HISTORY      Marital Status:     Substance Use History:   Social History     Substance and Sexual Activity   Alcohol Use Not Currently    Comment: quit Social History     Tobacco Use   Smoking Status Former   Smokeless Tobacco Never     Social History     Substance and Sexual Activity   Drug Use Not Currently   • Types: Marijuana    Comment: has medical card         FAMILY HISTORY      Non-Contributory      PHYSICAL EXAM     Vitals:   Blood Pressure: 111/78 (08/16/23 0748)  Pulse: 80 (08/16/23 0748)  Temperature: 98.7 °F (37.1 °C) (08/16/23 0748)  Temp Source: Temporal (08/16/23 0748)  Respirations: 19 (08/16/23 0748)  Height: 5' 4" (162.6 cm) (08/11/23 0228)  Weight - Scale: 53.5 kg (117 lb 15.1 oz) (08/16/23 0535)  SpO2: 97 % (08/16/23 1031)    Physical Exam:   GENERAL: NAD, moderately  HEENT:  NC/AT, No Scleral Icterus  CARDIAC:  Regular Rate  PULMONARY:  Non-Labored Breathing, No Respiratory Distress  ABDOMEN:  Soft, No Rebound/Guarding/Rigidity, No Organomegaly, No Tenderness  EXTREMITIES:  No Edema, Cyanosis, or Clubbing  NEUROLOGIC:  Alert  SKIN:  No Rashes or Erythema    ADDITIONAL DATA     Lab Results:       Lab Units 08/16/23  0418 08/15/23  0446 08/14/23  0518 08/12/23  0513 08/11/23  0508 07/10/23  0501 07/08/23  0537   SODIUM mmol/L 141 141 141 144 131*   < > 144   POTASSIUM mmol/L 4.1 4.0 3.6 4.1 3.1*   < > 3.2*   CHLORIDE mmol/L 104 107 110* 110* 101   < > 114*   CO2 mmol/L 29 25 27 28 22   < > 25   CO2, I-STAT   --   --   --   --   --    < >  --    BUN mg/dL 12 12 12 14 12   < > 15   CREATININE mg/dL 0.68 0.60 0.61 0.91 0.94   < > 0.88   GLUCOSE RANDOM mg/dL 91 100 88 94 551*   < > 87   CALCIUM mg/dL 8.0* 8.0* 7.8* 8.7 6.8*   < > 8.3   MAGNESIUM mg/dL 2.0 1.8* 1.5* 2.7 1.4*   < > 1.9   PHOSPHORUS mg/dL 3.9  --  2.3 2.4 3.0  --  3.3    < > = values in this interval not displayed.             Lab Units 08/16/23  0418 08/10/23  1558 07/22/23  0916 07/08/23  0537 06/30/23  1340   TOTAL PROTEIN g/dL 5.6* 5.4* 5.7* 5.1* 5.9*   ALBUMIN g/dL 2.6* 2.8* 2.5* 2.4* 3.1*   TOTAL BILIRUBIN mg/dL 0.57 0.63 0.50 0.74 0.64   BILIRUBIN DIRECT mg/dL  --   --   -- --  0.11   AST U/L 17 23 21 15 29   ALT U/L 14 15 15 21 13   ALK PHOS U/L 59 65 55 54 51           Lab Units 08/16/23  0418 08/15/23  0446 08/14/23  0518 08/12/23  0513 08/11/23  0508   WBC Thousand/uL 7.66 10.35* 8.74 13.57* 15.49*   HEMOGLOBIN g/dL 9.3* 9.4* 9.0* 9.7* 8.6*   HEMATOCRIT % 29.8* 30.3* 29.4* 32.4* 28.3*   PLATELETS Thousands/uL 230 266 211 240 246   MCV fL 104* 105* 105* 104* 106*       Lab Results   Component Value Date    IRON 77 06/25/2021    TIBC 267 06/25/2021    FERRITIN 98 06/25/2021       Lab Results   Component Value Date    INR 1.48 (H) 08/16/2023    INR 1.34 (H) 08/10/2023    INR 1.91 (H) 08/03/2023    PROTIME 17.9 (H) 08/16/2023    PROTIME 16.5 (H) 08/10/2023    PROTIME 22.1 (H) 08/03/2023         Microbiology Results:  Results from last 7 days   Lab Units 08/11/23  0813 08/10/23  1504 08/10/23  1503   BLOOD CULTURE   --  No Growth After 5 Days. No Growth After 5 Days. LEGIONELLA URINARY ANTIGEN  Negative  --   --        Imaging:  No results found. Narrative/Impressions - 3 day look back     EKG, Pathology, and Other Studies Reviewed on Admission:   · EKG: Reviewed    Counseling / Coordination of Care Time: 30 total mins spent n consult. Greater than 50% of total time spent on patient counseling and coordination of care. ............................................................................................................................................... Antonina Rudd M.D.  PGY-5 Gastroenterology Fellow  [facility]  Available on Melisa [email or web address]  Recommendations not final until attending attestation

## 2023-08-16 NOTE — SPEECH THERAPY NOTE
Speech Language/Pathology    Speech/Language Pathology Missed Visit Note     Pt currently NPO pending PEG placement w/ EGD on 8/17/2023. Prior to last ST note pt amendable to placement of PEG. Not appropriate for therapy services at this time. ST will continue to f/u.

## 2023-08-16 NOTE — PLAN OF CARE
Problem: OCCUPATIONAL THERAPY ADULT  Goal: Performs self-care activities at highest level of function for planned discharge setting. See evaluation for individualized goals. Description: Treatment Interventions: ADL retraining, Functional transfer training, UE strengthening/ROM, Endurance training, Cognitive reorientation, Patient/family training, Compensatory technique education, Continued evaluation          See flowsheet documentation for full assessment, interventions and recommendations. Note: Limitation: Decreased ADL status, Decreased UE strength, Decreased Safe judgement during ADL, Decreased cognition, Decreased endurance, Decreased high-level ADLs  Prognosis: Fair  Assessment: Pt is a 63y/o female admitted to the hospital 2* symptoms of change in mental status, hypoxia. Pt noted with acute respiratory failure with hypoxia, aspiration PNA; pt scheduled for PEG today. Pt with PMH stroke s/p L mca thrombectomy in 2/23, closed t12 fx with LSO brace, chronic t9,t11 fxs, kyphosis, bipolar, osteoporosis, dara, asthma, gerd, nighttime O2(2liters), RA, chf with ef 30% and mod AR, s/p fall(L LE hematoma) on 6/30 and arc admission on 7/7/23. PTA pt states that she had assistance with her ADLs, transfers, ambulation--with RW; +falls=3, neg home alone. During initial eval, pt demonstrated deficits with her functional balance, functional mobility, ADL status, transfer safety, b/l UE strength, activity tolerance(currently fair=15-20mins), and cognition(i.e.problem-solving, judgement/safety). Pt would benefit from continued OT tx for the above deficits. 3-5xwk/1-2wks. The patient's raw score on the AM-PAC Daily Activity Inpatient Short Form is 20. A raw score of greater than or equal to 19 suggests the patient may benefit from discharge to home. Please refer to the recommendation of the Occupational Therapist for safe discharge planning.      OT Discharge Recommendation: Home with home health rehabilitation

## 2023-08-16 NOTE — NURSING NOTE
Blood glucose dropped to 29mg/dl  SLIM notified. Patient asymptomatic. Necter thick orange juice give. [facility]   IV fluid 5D+half saline @30cc/hr  Blood glucose level rechecked : 61mg/dl  Hypoglycemic protocol followed

## 2023-08-16 NOTE — NURSING NOTE
URGENT CARE DEPARTMENT ENCOUNTER      CHIEF COMPLAINT    Chief Complaint   Patient presents with   • Shortness of Breath       HISTORY OF PRESENT ILLNESS    John Arzola is a pleasant 58 year old male who presents to the [facility] alone for evaluation and treatment of SOB x 2 wks. Denies: chest pain, cough/ URI sx, fever, sore throat, vomiting, diarrhea. States he just feels tight. When doing small things. Onset was not sudden. States similar a few years ago and was given albuterol which helped. Quit smoking 1mo ago, had smoked 30y. In 2010, had renal CA, had PE after, on warfarin. Hx of DVTs in past as well.            ALLERGIES    ALLERGIES:   Allergen Reactions   • Azithromycin DIZZINESS   • Seasonal Other (See Comments) and Runny Nose     Watery eyes, stuffy nose, post nasal drip, wheezing       CURRENT MEDICATIONS    Current Outpatient Medications   Medication Sig Dispense Refill   • warfarin (COUMADIN) 5 MG tablet TAKE 1/2 TABLET BY MOUTH ON SUNDAY, WEDNESDAY AND FRIDAY; TAKE 1 TABLET ON ALL OTHER DAYS. 30 tablet 0   • SUMAtriptan (IMITREX STATDOSE) 6 MG/0.5ML auto-injector Inject 0.5 mLs into the skin 1 time as needed for Migraine. 2 mL 11   • loratadine (CLARITIN) 10 MG tablet Take 10 mg by mouth daily.     • omeprazole (PriLOSEC) 40 MG capsule TAKE 1 CAPSULE BY MOUTH DAILY 90 capsule 3   • verapamil 240 MG 24 hr capsule Take 240 mg by mouth nightly. 90 capsule 3   • PARoxetine (PAXIL) 30 MG tablet Take 1 tablet by mouth every morning. 90 tablet 3   • tadalafil (CIALIS) 10 MG tablet Take 1 tablet by mouth as needed for Erectile Dysfunction. 10 tablet 3   • Cholecalciferol 25 mcg (1,000 units) capsule Take 1,000 Units by mouth.       No current facility-administered medications for this visit.       PAST MEDICAL HISTORY    Past Medical History:   Diagnosis Date   • Allergy    • Anxiety        SURGICAL HISTORY    Past Surgical History:   Procedure Laterality Date   • Anesth,ankle  PEG placement scheduled for tomorrow, since she had eliquis yesterday 5pm arthroscopy      left ankle 2006, 2007   • Anesth,knee arthroscopy      1980- left knee   • Anesth,knee arthroscopy      right knee 2001   • Carpal tunnel release      right 2009   • Carpal tunnel release Left 12/21/2022    LT endoscopic carpal tunnel release, left thenar eminence fb excision/[facility]   • Nephrectomy      2010   • Removal gallbladder         SOCIAL HISTORY    Social History     Tobacco Use   • Smoking status: Former     Packs/day: 0.50     Types: Cigarettes   • Smokeless tobacco: Never   • Tobacco comments:     Stopped smoking 01/27/2023   Vaping Use   • Vaping Use: never used   Substance Use Topics   • Alcohol use: Yes     Alcohol/week: 2.0 standard drinks     Types: 2 Cans of beer per week     Comment: socially   • Drug use: Never       FAMILY HISTORY    Family History   Problem Relation Age of Onset   • Irritable Bowel Syndrome Mother    • Patient is unaware of any medical problems Sister    • Patient is unaware of any medical problems Brother    • Patient is unaware of any medical problems Maternal Aunt    • Patient is unaware of any medical problems Maternal Uncle    • Patient is unaware of any medical problems Paternal Aunt    • Patient is unaware of any medical problems Paternal Uncle    • Alcohol Abuse Maternal Grandfather    • Emphysema Maternal Grandfather    • Early death Paternal Grandmother    • Early death Paternal Grandfather        REVIEW OF SYSTEMS    Constitutional:  Denies unusual weight gain, recent weight change, weakness, fatigue, fever, chills  Eyes:  Denies visual changes, redness, excessive tearing, double vision  HENT:  Denies headache, head injury, hearing loss, tinnitus, vertigo, earaches, infection, discharge, frequent colds, nasal stuffiness, sinus trouble, sore throat, hoarseness, lymphadenopathy   Respiratory: See HPI.   Denies cough, sputum, hemoptysis, wheezing, asthma, bronchitis, emphysema, pneumonia, tuberculosis.    Cardiovascular:  Denies heart trouble,  hypertension, rheumatic fever, murmur, dyspnea, orthopnea, edema, chest pain, palpitations  GI:  Denies abdominal pain, nausea, vomiting, diarrhea, constipation, blood in bowel movement, hemorrhoids, rectal pain, and dyspepsia.   :  Denies dysuria, polyuria, nocturia, hematuria, urgency, incontinence, infections, stones.    Musculoskeletal:  Denies joint pains, stiffness, arthritis, gout, back pain, neck pain, myalgias.    Integument:  Denies rashes, lumps, itching, dryness, color changes, hair changes  Neurologic:  Denies fainting, blackouts, seizures, weakness, numbness, tingling, tremors, memory loss.        PHYSICAL EXAM    Vitals:    02/22/23 0807   BP: (!) 142/86   Pulse: 80   Resp: 18   Temp: 98.1 °F (36.7 °C)   SpO2: 100%         Constitutional:  Well developed, well nourished. No acute distress, non-toxic appearance.   Respiratory:  No respiratory distress. Lungs are clear to auscultation bilaterally. No rales or wheezing appreciated. No stridor.   Cardiovascular:  Regular rate, regular rhythm.  murmur noted.  pulses 2+ bilaterally.   Integument:  No obvious rash. No diaphoresis.   Neurologic:  Alert & oriented to conversation. Moves all extremities. Speech is clear.   Psychiatric:  Speech and behavior appropriate.     LABS    D-dimer: normal  RADIOLOGY    CXR: normal  UC COURSE & MEDICAL DECISION MAKING    58 year old male who presents with SOB for past few wks.   DDX: includes MI, pericarditis, aortic dissection, pulmonary embolism, asthma, tension pneumothorax, esophageal rupture, myocarditis, MVP, pulmonary hypertension, HOCM, carditis, pneumonia, musculoskeletal, GERD, anxiety.  Patient is  nontoxic appearing. NL exam. Will draw dimer to r/o DVT/PE.CXR for baseline. Suspect \"COPD\" exacerbation.     IMPRESSION  Diagnosis:  The encounter diagnosis was Shortness of breath.    PLAN  Orders Placed This Encounter   • XR Chest PA and Lateral   • D Dimer, Quantitative   1.  pt informed of results/  reassured..   2. Treatment plan to include conservative supportive measures,   3. All questions and concerns addressed  4. Return precautions discussed.  Follow-up with family practice in the next few days if sx persist or ED/UC if sx worsen.   5. Patient discharged in stable and improved condition.      Isaak Clemens PA-C, MPAS  [facility]    The supervising physician for services performed today is Dr. La.

## 2023-08-16 NOTE — NURSING NOTE
50% Dextrose 25mls IV given. Cannot get blood sample. Informed hospital supervisor to get help with ultrasound guided blood sampling. ICU will assist.  Patient is alert and oriented.

## 2023-08-16 NOTE — OCCUPATIONAL THERAPY NOTE
Occupational Therapy Evaluation     Patient Name: Mason Gallardo  QLACB'H Date: 8/16/2023  Problem List  Principal Problem:    Acute respiratory failure with hypoxia ([address])  Active Problems:    Rheumatoid arthritis of multiple sites with negative rheumatoid factor (HCC)    History of stroke    Altered mental status    Dysphagia    Closed T12 fracture (HCC)    Electrolyte imbalance    Hypoglycemia    Past Medical History  Past Medical History:   Diagnosis Date    Anxiety     Asthma     Bipolar depression (HCC)     Chronic narcotic dependence (HCC)     Chronic pain     Depression     Enterovirus heart infection     SHAR (obstructive sleep apnea)     Osteoarthritis     Peripheral neuropathy     Plantar fasciitis of right foot     Senile osteoporosis     Seronegative arthropathy of multiple sites ([address])     Undifferentiated connective tissue disease ([address])      Past Surgical History  Past Surgical History:   Procedure Laterality Date    BREAST IMPLANT      FIXATION KYPHOPLASTY LUMBAR SPINE      HYSTERECTOMY      INCONTINENCE SURGERY N/A     IR STROKE ALERT  2/12/2023    NASAL SEPTOPLASTY W/ TURBINOPLASTY N/A     RECTAL PROLAPSE REPAIR N/A     REPAIR RECTOCELE      RHINOPLASTY N/A            08/16/23 1155   Note Type   Note type Evaluation   Pain Assessment   Pain Assessment Tool 0-10   Pain Score 7   Pain Location/Orientation Orientation: Lower; Location: Back   Restrictions/Precautions   Weight Bearing Precautions Per Order No   Braces or Orthoses LSO  (provided 8/7 with a doctor's visit; for OOB mobility)   Other Precautions Contact/isolation;Cognitive; Chair Alarm; Bed Alarm; Fall Risk;Spinal precautions   Home Living   Type of [address] [place] [address] Dr Two level; Able to live on main level with bedroom/bathroom   [address] chair   [place]; [address]   Prior Function   Lives With Son;Family  (DIL)   Falls in the last 6 months 1 to 4  (3)   Lifestyle   Autonomy PTA pt states that she had assistance with her ADLs, transfers, ambulation--with RW; +falls=3, neg home alone   Reciprocal Relationships supportive family   Service to Others homemaker   Intrinsic Gratification watching TV   Subjective   Subjective "I just don't have energy."   ADL   Where Assessed Edge of bed   Eating Assistance 5  Supervision/Setup   Grooming Assistance 5  Supervision/Setup   UB Bathing Assistance 4  Minimal Assistance    [address] 3  Moderate [place] 4  [address] 2  Maximal [address]  5  Supervision/Setup   Bed Mobility   Sit to Supine 4  Minimal assistance   Additional items Assist x 1; Increased time required;Verbal cues;LE management   Transfers   Sit to Stand 4  Minimal assistance   Additional items Assist x 1;Verbal cues; Increased time required   Stand to Sit 4  Minimal assistance   Additional items Assist x 1; Increased time required;Verbal cues   Functional Mobility   Functional Mobility 4  Minimal assistance   Additional Comments x1   Additional items Rolling walker   Balance   Static Sitting Fair +   Dynamic Sitting Fair   Static Standing Fair -   Dynamic Standing Poor +   Activity Tolerance   Activity Tolerance Patient limited by fatigue;Patient limited by pain   Medical Staff Made Aware nsg, P.T., CM   RUE Assessment   RUE Assessment WFL   RUE Strength   RUE Overall Strength Within Functional Limits - able to perform ADL tasks with strength  (4/5 throughout)   LUE Assessment   LUE Assessment WFL   LUE Strength   LUE Overall Strength Within Functional Limits - able to perform ADL tasks with strength  (4/5 throughout)   Hand Function   Gross Motor Coordination Functional   Fine Motor Coordination Functional   Sensation   Light Touch No apparent deficits   Proprioception   Proprioception No apparent deficits   Vision-Basic Assessment   Current Vision   (glasses)   Vision - Complex Assessment   Acuity Able to read clock/calendar on wall without difficulty   Psychosocial   Psychosocial (WDL) X   Patient Behaviors/Mood Flat affect; Cooperative   Perception   Inattention/Neglect Appears intact   Cognition   Overall Cognitive Status Impaired   Arousal/Participation Alert   Attention Attends with cues to redirect   Orientation Level Oriented to person;Oriented to place;Oriented to time   Memory Decreased short term memory;Decreased recall of precautions   Following Commands Follows one step commands without difficulty   Assessment   Limitation Decreased ADL status; Decreased UE strength;Decreased Safe judgement during ADL;Decreased cognition;Decreased endurance;Decreased high-level ADLs   Prognosis Fair   Assessment Pt is a 63y/o female admitted to the hospital 2* symptoms of change in mental status, hypoxia. Pt noted with acute respiratory failure with hypoxia, aspiration PNA; pt scheduled for PEG today. Pt with PMH stroke s/p L mca thrombectomy in 2/23, closed t12 fx with LSO brace, chronic t9,t11 fxs, kyphosis, bipolar, osteoporosis, dara, asthma, gerd, nighttime O2(2liters), RA, chf with ef 30% and mod AR, s/p fall(L LE hematoma) on 6/30 and arc admission on 7/7/23. PTA pt states that she had assistance with her ADLs, transfers, ambulation--with RW; +falls=3, neg home alone. During initial eval, pt demonstrated deficits with her functional balance, functional mobility, ADL status, transfer safety, b/l UE strength, activity tolerance(currently fair=15-20mins), and cognition(i.e.problem-solving, judgement/safety). Pt would benefit from continued OT tx for the above deficits. 3-5xwk/1-2wks. The patient's raw score on the AM-PAC Daily Activity Inpatient Short Form is 20. A raw score of greater than or equal to 19 suggests the patient may benefit from discharge to home. Please refer to the recommendation of the Occupational Therapist for safe discharge planning.    Goals   Patient Goals "to get better."   STG Time Frame   (1-7 days) Short Term Goal #1 Pt will demonstrate improved activity tolerance to good(20-30mins) and standing tolerance to 3-5mins to assist with ADLs. Short Term Goal #2 Pt will independently demonstrate knowledge and application of proper body mechanics/back safety 100% of the time. Short Term Goal  Pt will tolerate continued cognitive/home-safety assessment and appropriate d/c recommendations will be provided. LTG Time Frame   (7-14 days)   Long Term Goal #1 Pt will demonstrate mod I with their UE and LE bathing/dresssing. Long Term Goal #2 Pt will demonstrate g/g- balance with all functional activities. Long Term Goal Pt will demonstrate proper walker/transfer safety 100% of the time. Plan   Treatment Interventions ADL retraining;Functional transfer training;UE strengthening/ROM; Endurance training;Cognitive reorientation;Patient/family training; Compensatory technique education;Continued evaluation   Goal Expiration Date 08/30/23   OT Treatment Day 0   OT Frequency 2-3x/wk   Recommendation   OT Discharge Recommendation Home with home health rehabilitation   AM-PAC Daily Activity Inpatient   Lower Body Dressing 3   Bathing 3   Toileting 3   Upper Body Dressing 3   Grooming 4   Eating 4   Daily Activity Raw Score 20   Daily Activity Standardized Score (Calc for Raw Score >=11) 42.03   AM-PAC Applied Cognition Inpatient   Following a Speech/Presentation 3   Understanding Ordinary Conversation 3   Taking Medications 2   Remembering Where Things Are Placed or Put Away 2   Remembering List of 4-5 Errands 2   Taking Care of Complicated Tasks 2   Applied Cognition Raw Score 14   Applied Cognition Standardized Score 32.02   Faizan Gupta

## 2023-08-16 NOTE — ASSESSMENT & PLAN NOTE
Unfortunately, patient became acutely hypoglycemic this afternoon. Anticipate improvement once PEG tube feeds began.   We will start D5 half-normal at 30 cc an hour for support, monitor    Recent Labs     08/15/23  0546 08/15/23  1125 08/15/23  2336 08/16/23  0542 08/16/23  1214 08/16/23  1633   POCGLU 102 118 113 86 61* 29*

## 2023-08-16 NOTE — ANESTHESIA PREPROCEDURE EVALUATION
Procedure:  EGD    Relevant Problems   ANESTHESIA  Claustrophobia      CARDIO   (+) CHF (congestive heart failure) (HCC)   (+) HTN (hypertension)      GI/HEPATIC   (+) Dysphagia   (+) Gastroesophageal reflux disease without esophagitis   (+) Liver contusion      /RENAL   (+) LAILA (acute kidney injury) ([address])      HEMATOLOGY   (+) Anemia      MUSCULOSKELETAL   (+) Cervical spondylosis   (+) Gout of foot   (+) Lumbar spondylosis   (+) Primary generalized (osteo)arthritis   (+) Rheumatoid arthritis of multiple sites with negative rheumatoid factor (HCC)      NEURO/PSYCH   (+) Acute CVA (cerebrovascular accident) ([address])   (+) Chronic pain syndrome   (+) Stroke (HCC)      PULMONARY   (+) Acute respiratory failure with hypoxia ([facility])   (+) Obstructive sleep apnea   (+) Traumatic pneumothorax      Other   (+) Ambulatory dysfunction   (+) History of stroke   (+) Undifferentiated connective tissue disease ([address])        Physical Exam    Airway  Comment: Limited mouth opening  Mallampati score: II  TM Distance: >3 FB  Neck ROM: limited     Dental   Comment: implants,     Cardiovascular  Rhythm: regular, Rate: normal, Cardiovascular exam normal    Pulmonary  Pulmonary exam normal Breath sounds clear to auscultation,     Other Findings        Anesthesia Plan  ASA Score- 4 Emergent    Anesthesia Type- IV sedation with anesthesia with ASA Monitors. Additional Monitors:   Airway Plan:     Comment: •  Left Ventricle: Left ventricular cavity size is mildly dilated. Wall thickness is normal. The left ventricular ejection fraction is 30%. Systolic function is severely reduced. There is severe global hypokinesis with regional variation. •  Aortic Valve: There is mild to moderate regurgitation. •  Left Atrium: The atrium is dilated. •  Mitral Valve: There is mild regurgitation. •  Tricuspid Valve: There is mild regurgitation. •  Prior TTE study available for comparison. Prior study date: 2/12/2023.  No significant changes noted compared to the prior study.     .       Plan Factors-    Chart reviewed. EKG reviewed. Existing labs reviewed. Patient summary reviewed. Patient is not a current smoker. Induction- intravenous. Postoperative Plan-     Informed Consent- Anesthetic plan and risks discussed with patient.

## 2023-08-16 NOTE — PROGRESS NOTES
[facility]  Progress Note  Name: Janell Marvin  MRN: [id number]  Unit/Bed#: E2 -01 I Date of Admission: 8/10/2023   Date of Service: 8/16/2023 I Hospital Day: 6    Assessment/Plan   Hypoglycemia  Assessment & Plan  Unfortunately, patient became acutely hypoglycemic this afternoon. Anticipate improvement once PEG tube feeds began. We will start D5 half-normal at 30 cc an hour for support, monitor    Recent Labs     08/15/23  0546 08/15/23  1125 08/15/23  2336 08/16/23  0542 08/16/23  1214 08/16/23  1633   POCGLU 102 118 113 86 61* 29*         Electrolyte imbalance  Assessment & Plan  Replete PRN    Recent Labs     08/14/23  0518 08/15/23  0446 08/16/23  0418   K 3.6 4.0 4.1   MG 1.5* 1.8* 2.0         Closed T12 fracture (HCC)  Assessment & Plan  History of low impact fall that resulted in multiple T-spine fractures  · Receiving pain control with Morphine PRN  · Follows with neurosurgery in the outpatient setting    Dysphagia  Assessment & Plan  Currently on pureed and nectar thick, but still with aspiration. · Appreciate speech recommendations  · Patient and daughter agreeable to PEG tube placement, appreciate GI recommendations. Likely to place tomorrow. · Have consulted gastroenterology, patient n.p.o. at midnight  · Consult to nutrition    Altered mental status  Assessment & Plan  Altered mental status on presentation. Etiology possibly due to hypoglycemia, fatigue, underlying disease. · Continue supportive care  · Today, appeared alert and oriented X3, improving    History of stroke  Assessment & Plan    · Was held pending PEG tube placement; restart once PEG tube inserted    Rheumatoid arthritis of multiple sites with negative rheumatoid factor ([address])  Assessment & Plan  History of seronegative multiple joint arthropathy.   · Continue Plaquenil    * Acute respiratory failure with hypoxia (HCC)  Assessment & Plan  Acute respiratory failure with hypoxia secondary to aspiration pneumonitis. · Continue daily weaning trials  · Albuterol PRN  · Patient reports she is on 2 L at home/at night; currently on 2 L nasal cannula, improving  · Patient's daughter reports patient likely will need oxygen around-the-clock as she frequently desaturates during the day without oxygen support. · Home O2 eval prior to discharge as patient likely will require oxygen around-the-clock           VTE Pharmacologic Prophylaxis:   Pharmacologic: Pharmacologic VTE Prophylaxis contraindicated due to Perioperative  Mechanical VTE Prophylaxis in Place: Yes    Patient Centered Rounds: I have performed bedside rounds with nursing staff today. Discussions with Specialists or Other Care Team Provider: Gastroenterology    Education and Discussions with Family / Patient: Discussed treatment plan with family and patient who agree with current plan; encouraged to ask questions and participate. Time Spent for Care: 45 minutes. More than 50% of total time spent on counseling and coordination of care as described above. Current Length of Stay: 6 day(s)    Current Patient Status: Inpatient   Certification Statement: The patient will continue to require additional inpatient hospital stay due to Treatment of dysphagia    Discharge Plan: To be determined, likely 24 to 48 hours    Code Status: Level 1 - Full Code      Subjective:   Patient seen and examined bedside, no acute distress. This afternoon did become hypoglycemic, will start D5 half-normal for overnight support. Appreciate GI recommendations, likely to insert PEG tube tomorrow, will discuss additional requirements with inpatient nutritionist and discussed with case management regarding needed equipment delivery to patient's home. I have discussed with patient's daughter via phone and addressed questions and complaints.     Objective:     Vitals:   Temp (24hrs), Av °F (36.7 °C), Min:96.9 °F (36.1 °C), Max:98.7 °F (37.1 °C)    Temp:  [96.9 °F (36.1 °C)-98.7 °F (37.1 °C)] 98.4 °F (36.9 °C)  HR:  [] 101  Resp:  [16-20] 18  BP: ()/(48-78) 100/61  SpO2:  [96 %-100 %] 96 %  Body mass index is 20.25 kg/m². Input and Output Summary (last 24 hours): Intake/Output Summary (Last 24 hours) at 8/16/2023 1648  Last data filed at 8/16/2023 1508  Gross per 24 hour   Intake 810 ml   Output 250 ml   Net 560 ml       Physical Exam:     Physical Exam  Vitals and nursing note reviewed. Constitutional:       General: She is not in acute distress. Appearance: She is well-developed. HENT:      Head: Normocephalic and atraumatic. Eyes:      Conjunctiva/sclera: Conjunctivae normal.   Cardiovascular:      Rate and Rhythm: Normal rate and regular rhythm. Heart sounds: No murmur heard. Pulmonary:      Effort: Pulmonary effort is normal. No respiratory distress. Breath sounds: Normal breath sounds. Abdominal:      Palpations: Abdomen is soft. Tenderness: There is no abdominal tenderness. Musculoskeletal:         General: No swelling. Cervical back: Neck supple. Comments: Kyphotic   Skin:     General: Skin is warm and dry. Neurological:      Mental Status: She is alert.       Comments: Dysphonia; left-sided weakness   Psychiatric:         Mood and Affect: Mood normal.           Additional Data:     Labs:    Results from last 7 days   Lab Units 08/16/23 0418   WBC Thousand/uL 7.66   HEMOGLOBIN g/dL 9.3*   HEMATOCRIT % 29.8*   PLATELETS Thousands/uL 230   NEUTROS PCT % 72   LYMPHS PCT % 16   MONOS PCT % 11   EOS PCT % 0     Results from last 7 days   Lab Units 08/16/23 0418   SODIUM mmol/L 141   POTASSIUM mmol/L 4.1   CHLORIDE mmol/L 104   CO2 mmol/L 29   BUN mg/dL 12   CREATININE mg/dL 0.68   ANION GAP mmol/L 8   CALCIUM mg/dL 8.0*   ALBUMIN g/dL 2.6*   TOTAL BILIRUBIN mg/dL 0.57   ALK PHOS U/L 59   ALT U/L 14   AST U/L 17   GLUCOSE RANDOM mg/dL 91     Results from last 7 days   Lab Units 08/16/23 0418   INR  1.48* Results from last 7 days   Lab Units 08/16/23  1633 08/16/23  1214 08/16/23  0542 08/15/23  2336 08/15/23  1125 08/15/23  0546 08/14/23  1149 08/14/23  0549 08/13/23  2357 08/13/23  1743 08/13/23  1229 08/13/23  0548   POC GLUCOSE mg/dl 29* 61* 86 113 118 102 228* 106 94 147* 122 164*         Results from last 7 days   Lab Units 08/15/23  0446 08/11/23  0508 08/10/23  1710 08/10/23  1503   LACTIC ACID mmol/L  --   --  2.0 2.7*   PROCALCITONIN ng/ml 0.08 0.08  --  0.07           * I Have Reviewed All Lab Data Listed Above. * Additional Pertinent Lab Tests Reviewed: All Labs Within Last 24 Hours Reviewed    Imaging:    Imaging Reports Reviewed Today Include:   Imaging Personally Reviewed by Myself Includes:      Recent Cultures (last 7 days):     Results from last 7 days   Lab Units 08/11/23  0813 08/10/23  1504 08/10/23  1503   BLOOD CULTURE   --  No Growth After 5 Days. No Growth After 5 Days.    LEGIONELLA URINARY ANTIGEN  Negative  --   --        Last 24 Hours Medication List:   Current Facility-Administered Medications   Medication Dose Route Frequency Provider Last Rate   • albuterol  2 puff Inhalation Q6H PRN Lianet Marroquin PA-C     • aluminum-magnesium hydroxide-simethicone  30 mL Oral Q4H PRN Carlo Sierra PA-C     • buPROPion  50 mg Oral BID Ashlie Carrasco PA-C     • cinacalcet  30 mg Oral Daily With Breakfast Lianet Marroquin PA-C     • dextrose 5 % and sodium chloride 0.45 %  30 mL/hr Intravenous Continuous Azam Alvaerz MD 30 mL/hr (08/16/23 1645)   • DULoxetine  60 mg Oral BID Lianet Marroquin PA-C     • hydroxychloroquine  200 mg Oral BID With Meals Margarette Zamarripa MD     • metoprolol succinate  25 mg Oral Daily Lianet Marroquin PA-C     • morphine injection  2 mg Intravenous Q4H PRN Azam Alvarez MD     • morphine injection  4 mg Intravenous Q4H PRN Azam Alvarez MD     • ondansetron  4 mg Intravenous Q6H PRN Violet Yao PA-C     • [START ON 8/17/2023] pantoprazole  40 mg Intravenous Q24H [facility] Mamta Vasquez MD          Today, Patient Was Seen By: Javad Mays MD    ** Please Note: Dictation voice to text software may have been used in the creation of this document.  **

## 2023-08-16 NOTE — ASSESSMENT & PLAN NOTE
Replete PRN    Recent Labs     08/14/23  0518 08/15/23  0446 08/16/23  0418   K 3.6 4.0 4.1   MG 1.5* 1.8* 2.0

## 2023-08-16 NOTE — CONSULTS
Nutrition consult received and appreciated      Recommend to initiate Jevity 1.5cal at 20 ml/hr and advance 10 ml/hr to goal rate of 40 ml/hr x 24hr, will provide 1440kcal, 60g protein, 730 ml free water, 96% RDI for micronutrients/vitamin, free water flushes: 130ml q4hr or per medical team;     Transition to bolus feeds when able to tolerate feeds: Jevity 1.5 jacklyn 8 oz (237ml) bolus x 4/day with 100ml free water flush before and after each feed, will provide 1420kcal, 60g protein, 1520ml free water    Nutrition will continue to follow up as per policy    Alyse Hummel MS RD LDN CNSC

## 2023-08-16 NOTE — ASSESSMENT & PLAN NOTE
Currently on pureed and nectar thick, but still with aspiration. · Appreciate speech recommendations  · Patient and daughter agreeable to PEG tube placement, appreciate GI recommendations. Likely to place tomorrow.   · Have consulted gastroenterology, patient n.p.o. at midnight  · Consult to nutrition

## 2023-08-16 NOTE — ASSESSMENT & PLAN NOTE
Acute respiratory failure with hypoxia secondary to aspiration pneumonitis. · Continue daily weaning trials  · Albuterol PRN  · Patient reports she is on 2 L at home/at night; currently on 2 L nasal cannula, improving  · Patient's daughter reports patient likely will need oxygen around-the-clock as she frequently desaturates during the day without oxygen support.   · Home O2 eval prior to discharge as patient likely will require oxygen around-the-clock

## 2023-08-16 NOTE — NURSING NOTE
Seen by GI team.  In room air O2 sat 97 to 98% and patient is awake. PEG placement will be done today.   NPO since last night, 12am.

## 2023-08-16 NOTE — PLAN OF CARE
Problem: PHYSICAL THERAPY ADULT  Goal: Performs mobility at highest level of function for planned discharge setting. See evaluation for individualized goals. Description: Treatment/Interventions: Functional transfer training, LE strengthening/ROM, Elevations, Therapeutic exercise, Endurance training, Patient/family training, Bed mobility, Gait training, Spoke to nursing, OT  Equipment Recommended: Ana Hicks (pt has)       See flowsheet documentation for full assessment, interventions and recommendations. Note: Prognosis: Fair  Problem List: Decreased strength, Decreased endurance, Impaired balance, Decreased mobility, Pain, Orthopedic restrictions  Assessment: Pt. 70 y. o.female presented via EMS for altered mental status & hypoxia. Pt admitted for Acute respiratory failure with hypoxia (HCC) w/ sepsis, hypoglycemia & electrolyte imbalance. Pt w/ h/o stroke s/p L mca thrombectomy in 5/20//23; closed T12 fx with LSO brace; chronic T9 & T11 fxs; kyphosis. Pt referred to PT for mobility assessment & D/C planning w/ orders of OOB to chair. Please see above for information re: home set-up & PLOF as well as objective findings during PT assessment. PTA, pt reports being (A) w/ ADL's and (A) w/ amb w/ RW. On eval, pt functioning below baseline hence will continue skilled PT to improve function & safety. Upon arrival, pt wearing LSO, seated at EOB w/ PCA. Pt require minAx1 for most functional mobility w/ RW + cues for techniques & safety. Gait deviations as above, slow w/ dec foot clearnace & strides but no gross LOB noted. Dec amb tolerance 2* to pain & fatigue. The patient's AM-PAC Basic Mobility Inpatient Short Form Raw Score is 17. A Raw score of greater than 16 suggests the patient may benefit from discharge to home. Please also refer to the recommendation of the Physical Therapist for safe discharge planning. From PT standpoint, will anticipate safe return to home w/ family support when medically cleared.  Will recommend HHPT & inc family support at D/C. No SOB & dizziness reported t/o session. Nsg staff most recent vital signs as follows: /78 (BP Location: Right arm)   Pulse 80   Temp 98.7 °F (37.1 °C) (Temporal)   Resp 19   Ht 5' 4" (1.626 m)   Wt 53.5 kg (117 lb 15.1 oz)   SpO2 97%   BMI 20.25 kg/m² . At end of session, pt back in bed in stable condition, call bell & phone in reach, bed alarm activated. Fall precautions reinforced w/ good understanding. CM to follow. Nsg staff to continue to mobilized pt (OOB in chair for all meals & ambulate in room/unit) as tolerated to prevent further decline in function. Nsg notified. Will also recommend restorative for daily amb. Co-eval was necessary to complete this PT eval for the pt's best interest given pt's medical acuity & complexity. PT Discharge Recommendation: Home with home health rehabilitation (w/ inc family support)    See flowsheet documentation for full assessment.

## 2023-08-17 ENCOUNTER — ANESTHESIA (INPATIENT)
Dept: GASTROENTEROLOGY | Facility: HOSPITAL | Age: 68
End: 2023-08-17
Payer: MEDICARE

## 2023-08-17 RX ORDER — PROPOFOL 10 MG/ML
INJECTION, EMULSION INTRAVENOUS AS NEEDED
Status: DISCONTINUED | OUTPATIENT
Start: 2023-08-17 | End: 2023-08-17

## 2023-08-17 RX ORDER — PHENYLEPHRINE HYDROCHLORIDE 10 MG/ML
INJECTION INTRAVENOUS AS NEEDED
Status: DISCONTINUED | OUTPATIENT
Start: 2023-08-17 | End: 2023-08-17

## 2023-08-17 RX ORDER — CEFAZOLIN SODIUM 2 G/50ML
SOLUTION INTRAVENOUS AS NEEDED
Status: DISCONTINUED | OUTPATIENT
Start: 2023-08-17 | End: 2023-08-17

## 2023-08-17 RX ORDER — SODIUM CHLORIDE 9 MG/ML
INJECTION, SOLUTION INTRAVENOUS CONTINUOUS PRN
Status: DISCONTINUED | OUTPATIENT
Start: 2023-08-17 | End: 2023-08-17

## 2023-08-17 RX ORDER — LIDOCAINE HCL/PF 100 MG/5ML
SYRINGE (ML) INJECTION AS NEEDED
Status: DISCONTINUED | OUTPATIENT
Start: 2023-08-17 | End: 2023-08-17

## 2023-08-17 RX ADMIN — CEFAZOLIN SODIUM 2000 MG: 2 SOLUTION INTRAVENOUS at 16:45

## 2023-08-17 RX ADMIN — LIDOCAINE HYDROCHLORIDE 100 MG: 20 INJECTION INTRAVENOUS at 16:46

## 2023-08-17 RX ADMIN — SODIUM CHLORIDE: 0.9 INJECTION, SOLUTION INTRAVENOUS at 16:30

## 2023-08-17 RX ADMIN — PHENYLEPHRINE HYDROCHLORIDE 150 MCG: 10 INJECTION INTRAVENOUS at 17:02

## 2023-08-17 RX ADMIN — PROPOFOL 20 MG: 10 INJECTION, EMULSION INTRAVENOUS at 17:04

## 2023-08-17 RX ADMIN — PROPOFOL 50 MG: 10 INJECTION, EMULSION INTRAVENOUS at 16:49

## 2023-08-17 RX ADMIN — PROPOFOL 60 MG: 10 INJECTION, EMULSION INTRAVENOUS at 16:46

## 2023-08-17 RX ADMIN — PROPOFOL 30 MG: 10 INJECTION, EMULSION INTRAVENOUS at 16:57

## 2023-08-17 NOTE — ASSESSMENT & PLAN NOTE
Unfortunately, patient became acutely hypoglycemic this admission. Anticipate improvement once PEG tube feeds began. We will start D5 half-normal at 30 cc an hour for support, monitor    8/17-update: Spoke with endocrinology and appreciate their recommendations. Recommend holding dextrose and allowing blood glucose to drop below 40. Obtain the following labs: Insulin, C-peptide, cortisol, proinsulin, glucose, growth hormone, Cefol urea screen with urine and plasma. After that, may initiate dextrose drip as needed and endocrinology will follow along.     Recent Labs     08/17/23  0542 08/17/23  1125 08/17/23  1545 08/17/23  1552 08/17/23  1630 08/17/23  1738   POCGLU 114 73 58* 77 72 140

## 2023-08-17 NOTE — PLAN OF CARE
Problem: Potential for Falls  Goal: Patient will remain free of falls  Description: INTERVENTIONS:  - Educate patient/family on patient safety including physical limitations  - Instruct patient to call for assistance with activity   - Consult OT/PT to assist with strengthening/mobility   - Keep Call bell within reach  - Keep bed low and locked with side rails adjusted as appropriate  - Keep care items and personal belongings within reach  - Initiate and maintain comfort rounds  - Make Fall Risk Sign visible to staff  - Offer Toileting every 2 Hours, in advance of need  - Initiate/Maintain bed/chair alarm  - Obtain necessary fall risk management equipment: bed/chair alarm, call bell, gripper socks, walker, bedside commode  - Apply yellow socks and bracelet for high fall risk patients  - Consider moving patient to room near nurses station  Outcome: Progressing     Problem: MOBILITY - ADULT  Goal: Maintain or return to baseline ADL function  Description: INTERVENTIONS:  - Educate patient/family on patient safety including physical limitations  - Instruct patient to call for assistance with activity   - Consult OT/PT to assist with strengthening/mobility   - Keep Call bell within reach  - Keep bed low and locked with side rails adjusted as appropriate  - Keep care items and personal belongings within reach  - Initiate and maintain comfort rounds  - Make Fall Risk Sign visible to staff  - Offer Toileting every 2 Hours, in advance of need  - Initiate/Maintain bed/chair alarm  - Obtain necessary fall risk management equipment: bed/chair alarm, call bell, gripper socks, walker, bedside commode  - Apply yellow socks and bracelet for high fall risk patients  - Consider moving patient to room near nurses station  Outcome: Progressing  Goal: Maintains/Returns to pre admission functional level  Description: INTERVENTIONS:  - Perform BMAT or MOVE assessment daily.   - Set and communicate daily mobility goal to care team and patient. - Collaborate with rehabilitation services on mobility goals if consulted  - Reposition patient every 2 hours.   - Dangle patient 3 times a day  - Stand patient 3 times a day  - Ambulate patient 3 times a day  - Out of bed to chair 3 times a day   - Out of bed for meals  - Out of bed for toileting  - Record patient progress and toleration of activity level   Outcome: Progressing     Problem: PAIN - ADULT  Goal: Verbalizes/displays adequate comfort level or baseline comfort level  Description: Interventions:  - Encourage patient to monitor pain and request assistance  - Assess pain using appropriate pain scale  - Administer analgesics based on type and severity of pain and evaluate response  - Implement non-pharmacological measures as appropriate and evaluate response  - Consider cultural and social influences on pain and pain management  - Notify physician/advanced practitioner if interventions unsuccessful or patient reports new pain  Outcome: Progressing     Problem: INFECTION - ADULT  Goal: Absence or prevention of progression during hospitalization  Description: INTERVENTIONS:  - Assess and monitor for signs and symptoms of infection  - Monitor lab/diagnostic results  - Monitor all insertion sites, i.e. indwelling lines, tubes, and drains  - Monitor endotracheal if appropriate and nasal secretions for changes in amount and color  - [place] appropriate cooling/warming therapies per order  - Administer medications as ordered  - Instruct and encourage patient and family to use good hand hygiene technique  - Identify and instruct in appropriate isolation precautions (contact isolation for CRE) for identified infection/condition  Outcome: Progressing     Problem: SAFETY ADULT  Goal: Patient will remain free of falls  Description: INTERVENTIONS:  - Educate patient/family on patient safety including physical limitations  - Instruct patient to call for assistance with activity   - Consult OT/PT to assist with strengthening/mobility   - Keep Call bell within reach  - Keep bed low and locked with side rails adjusted as appropriate  - Keep care items and personal belongings within reach  - Initiate and maintain comfort rounds  - Make Fall Risk Sign visible to staff  - Offer Toileting every 2 Hours, in advance of need  - Initiate/Maintain bed/chair alarm  - Obtain necessary fall risk management equipment: bed/chair alarm, call bell, gripper socks, walker, bedside commode  - Apply yellow socks and bracelet for high fall risk patients  - Consider moving patient to room near nurses station  Outcome: Progressing  Goal: Maintain or return to baseline ADL function  Description: INTERVENTIONS:  - Educate patient/family on patient safety including physical limitations  - Instruct patient to call for assistance with activity   - Consult OT/PT to assist with strengthening/mobility   - Keep Call bell within reach  - Keep bed low and locked with side rails adjusted as appropriate  - Keep care items and personal belongings within reach  - Initiate and maintain comfort rounds  - Make Fall Risk Sign visible to staff  - Offer Toileting every 2 Hours, in advance of need  - Initiate/Maintain bed/chair alarm  - Obtain necessary fall risk management equipment: bed/chair alarm, call bell, gripper socks, walker, bedside commode  - Apply yellow socks and bracelet for high fall risk patients  - Consider moving patient to room near nurses station  Outcome: Progressing  Goal: Maintains/Returns to pre admission functional level  Description: INTERVENTIONS:  - Perform BMAT or MOVE assessment daily.   - Set and communicate daily mobility goal to care team and patient. - Collaborate with rehabilitation services on mobility goals if consulted  - Reposition patient every 2 hours.   - Dangle patient 3 times a day  - Stand patient 3 times a day  - Ambulate patient 3 times a day  - Out of bed to chair 3 times a day   - Out of bed for meals  - Out of bed for toileting  - Record patient progress and toleration of activity level   Outcome: Progressing     Problem: DISCHARGE PLANNING  Goal: Discharge to home or other facility with appropriate resources  Description: INTERVENTIONS:  - Identify barriers to discharge w/patient  - Arrange for needed discharge resources and transportation as appropriate  - Identify discharge learning needs   - Refer to Case Management Department for coordinating discharge planning if the patient needs post-hospital services based on physician/advanced practitioner order or complex needs related to functional status, cognitive ability, or social support system  Outcome: Progressing     Problem: Knowledge Deficit  Goal: Patient/family/caregiver demonstrates understanding of disease process, treatment plan, medications, and discharge instructions  Description: Complete learning assessment and assess knowledge base.   Interventions:  - Provide teaching at level of understanding  - Provide teaching via preferred learning methods  Outcome: Progressing     Problem: Prexisting or High Potential for Compromised Skin Integrity  Goal: Skin integrity is maintained or improved  Description: INTERVENTIONS:  - Identify patients at risk for skin breakdown  - Assess and monitor skin integrity  - Assess and monitor nutrition and hydration status  - Monitor labs   - Assess for incontinence   - Turn and reposition patient  - Assist with mobility/ambulation  - Relieve pressure over bony prominences  - Avoid friction and shearing  - Provide appropriate hygiene as needed including keeping skin clean and dry  - Evaluate need for skin moisturizer/barrier cream  - Collaborate with interdisciplinary team   - Patient/family teaching  - Consider wound care consult   Outcome: Progressing     Problem: Nutrition/Hydration-ADULT  Goal: Nutrient/Hydration intake appropriate for improving, restoring or maintaining nutritional needs  Description: Monitor and assess patient's nutrition/hydration status for malnutrition. Collaborate with interdisciplinary team and initiate plan and interventions as ordered. Monitor patient's weight and dietary intake as ordered or per policy. Utilize nutrition screening tool and intervene as necessary. Determine patient's food preferences and provide high-protein, high-caloric foods as appropriate.      INTERVENTIONS:  - Monitor oral intake, urinary output, labs, and treatment plans  - Assess nutrition and hydration status and recommend course of action  - Evaluate amount of meals eaten  - Do not leave patient alone while eating due to aspiration risk  - Assist patient with eating if necessary   - Allow adequate time for meals  - Recommend/ encourage appropriate diets, oral nutritional supplements, and vitamin/mineral supplements  - Order, calculate, and assess calorie counts as needed  - Assess need for intravenous fluids  - Provide specific nutrition/hydration education as appropriate  - Include patient in decisions related to nutrition  Outcome: Progressing     Problem: RESPIRATORY - ADULT  Goal: Achieves optimal ventilation and oxygenation  Description: INTERVENTIONS:  - Assess for changes in respiratory status  - Assess for changes in mentation and behavior  - Position to facilitate oxygenation and minimize respiratory effort  - Oxygen administered by appropriate delivery if ordered   - Encourage broncho-pulmonary hygiene including cough, deep breathe, Incentive Spirometry, flutter valve  - Assess the need for suctioning and aspirate as needed  - Assess and instruct to report SOB or any respiratory difficulty  - Respiratory Therapy support as indicated  Outcome: Progressing     Problem: GENITOURINARY - ADULT  Goal: Maintains or returns to baseline urinary function  Description: INTERVENTIONS:  - Assess urinary function  - Encourage oral fluids to ensure adequate hydration if ordered  - Administer IV fluids as ordered to ensure adequate hydration  - Administer ordered medications as needed  - Offer frequent toileting  - Follow urinary retention protocol if ordered  Outcome: Progressing  Goal: Urinary catheter remains patent  Description: INTERVENTIONS:  - Assess patency of urinary catheter  - Follow guidelines for intermittent irrigation of non-functioning urinary catheter  Outcome: Progressing

## 2023-08-17 NOTE — ASSESSMENT & PLAN NOTE
Currently on pureed and nectar thick, but still with aspiration. · Appreciate speech recommendations  · Patient and daughter agreeable to PEG tube placement, appreciate GI recommendations. Likely to place tomorrow.   · Have consulted gastroenterology, patient n.p.o. at midnight  · Consult to nutrition, appreciate recommendations

## 2023-08-17 NOTE — PLAN OF CARE
Problem: PAIN - ADULT  Goal: Verbalizes/displays adequate comfort level or baseline comfort level  Description: Interventions:  - Encourage patient to monitor pain and request assistance  - Assess pain using appropriate pain scale  - Administer analgesics based on type and severity of pain and evaluate response  - Implement non-pharmacological measures as appropriate and evaluate response  - Consider cultural and social influences on pain and pain management  - Notify physician/advanced practitioner if interventions unsuccessful or patient reports new pain  Outcome: Progressing     Problem: Nutrition/Hydration-ADULT  Goal: Nutrient/Hydration intake appropriate for improving, restoring or maintaining nutritional needs  Description: Monitor and assess patient's nutrition/hydration status for malnutrition. Collaborate with interdisciplinary team and initiate plan and interventions as ordered. Monitor patient's weight and dietary intake as ordered or per policy. Utilize nutrition screening tool and intervene as necessary. Determine patient's food preferences and provide high-protein, high-caloric foods as appropriate.      INTERVENTIONS:  - Monitor oral intake, urinary output, labs, and treatment plans  - Assess nutrition and hydration status and recommend course of action  - Evaluate amount of meals eaten  - Do not leave patient alone while eating due to aspiration risk  - Assist patient with eating if necessary   - Allow adequate time for meals  - Recommend/ encourage appropriate diets, oral nutritional supplements, and vitamin/mineral supplements  - Order, calculate, and assess calorie counts as needed  - Assess need for intravenous fluids  - Provide specific nutrition/hydration education as appropriate  - Include patient in decisions related to nutrition  Outcome: Progressing     Problem: RESPIRATORY - ADULT  Goal: Achieves optimal ventilation and oxygenation  Description: INTERVENTIONS:  - Assess for changes in respiratory status  - Assess for changes in mentation and behavior  - Position to facilitate oxygenation and minimize respiratory effort  - Oxygen administered by appropriate delivery if ordered   - Encourage broncho-pulmonary hygiene including cough, deep breathe, Incentive Spirometry, flutter valve  - Assess the need for suctioning and aspirate as needed  - Assess and instruct to report SOB or any respiratory difficulty  - Respiratory Therapy support as indicated  Outcome: Progressing

## 2023-08-17 NOTE — PLAN OF CARE
Problem: Potential for Falls  Goal: Patient will remain free of falls  Description: INTERVENTIONS:  - Educate patient/family on patient safety including physical limitations  - Instruct patient to call for assistance with activity   - Consult OT/PT to assist with strengthening/mobility   - Keep Call bell within reach  - Keep bed low and locked with side rails adjusted as appropriate  - Keep care items and personal belongings within reach  - Initiate and maintain comfort rounds  - Make Fall Risk Sign visible to staff  - Offer Toileting every 2 Hours, in advance of need  - Initiate/Maintain bed/chair alarm  - Obtain necessary fall risk management equipment: bed/chair alarm, call bell, gripper socks, walker, bedside commode  - Apply yellow socks and bracelet for high fall risk patients  - Consider moving patient to room near nurses station  Outcome: Progressing     Problem: MOBILITY - ADULT  Goal: Maintain or return to baseline ADL function  Description: INTERVENTIONS:  - Educate patient/family on patient safety including physical limitations  - Instruct patient to call for assistance with activity   - Consult OT/PT to assist with strengthening/mobility   - Keep Call bell within reach  - Keep bed low and locked with side rails adjusted as appropriate  - Keep care items and personal belongings within reach  - Initiate and maintain comfort rounds  - Make Fall Risk Sign visible to staff  - Offer Toileting every 2 Hours, in advance of need  - Initiate/Maintain bed/chair alarm  - Obtain necessary fall risk management equipment: bed/chair alarm, call bell, gripper socks, walker, bedside commode  - Apply yellow socks and bracelet for high fall risk patients  - Consider moving patient to room near nurses station  Outcome: Progressing  Goal: Maintains/Returns to pre admission functional level  Description: INTERVENTIONS:  - Perform BMAT or MOVE assessment daily.   - Set and communicate daily mobility goal to care team and patient. - Collaborate with rehabilitation services on mobility goals if consulted  - Reposition patient every 2 hours.   - Dangle patient 3 times a day  - Stand patient 3 times a day  - Ambulate patient 3 times a day  - Out of bed to chair 3 times a day   - Out of bed for meals  - Out of bed for toileting  - Record patient progress and toleration of activity level   Outcome: Progressing     Problem: PAIN - ADULT  Goal: Verbalizes/displays adequate comfort level or baseline comfort level  Description: Interventions:  - Encourage patient to monitor pain and request assistance  - Assess pain using appropriate pain scale  - Administer analgesics based on type and severity of pain and evaluate response  - Implement non-pharmacological measures as appropriate and evaluate response  - Consider cultural and social influences on pain and pain management  - Notify physician/advanced practitioner if interventions unsuccessful or patient reports new pain  Outcome: Progressing     Problem: INFECTION - ADULT  Goal: Absence or prevention of progression during hospitalization  Description: INTERVENTIONS:  - Assess and monitor for signs and symptoms of infection  - Monitor lab/diagnostic results  - Monitor all insertion sites, i.e. indwelling lines, tubes, and drains  - Monitor endotracheal if appropriate and nasal secretions for changes in amount and color  - [place] appropriate cooling/warming therapies per order  - Administer medications as ordered  - Instruct and encourage patient and family to use good hand hygiene technique  - Identify and instruct in appropriate isolation precautions (contact isolation for CRE) for identified infection/condition  Outcome: Progressing     Problem: SAFETY ADULT  Goal: Patient will remain free of falls  Description: INTERVENTIONS:  - Educate patient/family on patient safety including physical limitations  - Instruct patient to call for assistance with activity   - Consult OT/PT to assist with strengthening/mobility   - Keep Call bell within reach  - Keep bed low and locked with side rails adjusted as appropriate  - Keep care items and personal belongings within reach  - Initiate and maintain comfort rounds  - Make Fall Risk Sign visible to staff  - Offer Toileting every 2 Hours, in advance of need  - Initiate/Maintain bed/chair alarm  - Obtain necessary fall risk management equipment: bed/chair alarm, call bell, gripper socks, walker, bedside commode  - Apply yellow socks and bracelet for high fall risk patients  - Consider moving patient to room near nurses station  Outcome: Progressing  Goal: Maintain or return to baseline ADL function  Description: INTERVENTIONS:  - Educate patient/family on patient safety including physical limitations  - Instruct patient to call for assistance with activity   - Consult OT/PT to assist with strengthening/mobility   - Keep Call bell within reach  - Keep bed low and locked with side rails adjusted as appropriate  - Keep care items and personal belongings within reach  - Initiate and maintain comfort rounds  - Make Fall Risk Sign visible to staff  - Offer Toileting every 2 Hours, in advance of need  - Initiate/Maintain bed/chair alarm  - Obtain necessary fall risk management equipment: bed/chair alarm, call bell, gripper socks, walker, bedside commode  - Apply yellow socks and bracelet for high fall risk patients  - Consider moving patient to room near nurses station  Outcome: Progressing  Goal: Maintains/Returns to pre admission functional level  Description: INTERVENTIONS:  - Perform BMAT or MOVE assessment daily.   - Set and communicate daily mobility goal to care team and patient. - Collaborate with rehabilitation services on mobility goals if consulted  - Reposition patient every 2 hours.   - Dangle patient 3 times a day  - Stand patient 3 times a day  - Ambulate patient 3 times a day  - Out of bed to chair 3 times a day   - Out of bed for meals  - Out of bed for toileting  - Record patient progress and toleration of activity level   Outcome: Progressing     Problem: DISCHARGE PLANNING  Goal: Discharge to home or other facility with appropriate resources  Description: INTERVENTIONS:  - Identify barriers to discharge w/patient  - Arrange for needed discharge resources and transportation as appropriate  - Identify discharge learning needs   - Refer to Case Management Department for coordinating discharge planning if the patient needs post-hospital services based on physician/advanced practitioner order or complex needs related to functional status, cognitive ability, or social support system  Outcome: Progressing     Problem: Knowledge Deficit  Goal: Patient/family/caregiver demonstrates understanding of disease process, treatment plan, medications, and discharge instructions  Description: Complete learning assessment and assess knowledge base.   Interventions:  - Provide teaching at level of understanding  - Provide teaching via preferred learning methods  Outcome: Progressing     Problem: Prexisting or High Potential for Compromised Skin Integrity  Goal: Skin integrity is maintained or improved  Description: INTERVENTIONS:  - Identify patients at risk for skin breakdown  - Assess and monitor skin integrity  - Assess and monitor nutrition and hydration status  - Monitor labs   - Assess for incontinence   - Turn and reposition patient  - Assist with mobility/ambulation  - Relieve pressure over bony prominences  - Avoid friction and shearing  - Provide appropriate hygiene as needed including keeping skin clean and dry  - Evaluate need for skin moisturizer/barrier cream  - Collaborate with interdisciplinary team   - Patient/family teaching  - Consider wound care consult   Outcome: Progressing     Problem: Nutrition/Hydration-ADULT  Goal: Nutrient/Hydration intake appropriate for improving, restoring or maintaining nutritional needs  Description: Monitor and assess patient's nutrition/hydration status for malnutrition. Collaborate with interdisciplinary team and initiate plan and interventions as ordered. Monitor patient's weight and dietary intake as ordered or per policy. Utilize nutrition screening tool and intervene as necessary. Determine patient's food preferences and provide high-protein, high-caloric foods as appropriate.      INTERVENTIONS:  - Monitor oral intake, urinary output, labs, and treatment plans  - Assess nutrition and hydration status and recommend course of action  - Evaluate amount of meals eaten  - Do not leave patient alone while eating due to aspiration risk  - Assist patient with eating if necessary   - Allow adequate time for meals  - Recommend/ encourage appropriate diets, oral nutritional supplements, and vitamin/mineral supplements  - Order, calculate, and assess calorie counts as needed  - Assess need for intravenous fluids  - Provide specific nutrition/hydration education as appropriate  - Include patient in decisions related to nutrition  Outcome: Progressing     Problem: RESPIRATORY - ADULT  Goal: Achieves optimal ventilation and oxygenation  Description: INTERVENTIONS:  - Assess for changes in respiratory status  - Assess for changes in mentation and behavior  - Position to facilitate oxygenation and minimize respiratory effort  - Oxygen administered by appropriate delivery if ordered   - Encourage broncho-pulmonary hygiene including cough, deep breathe, Incentive Spirometry, flutter valve  - Assess the need for suctioning and aspirate as needed  - Assess and instruct to report SOB or any respiratory difficulty  - Respiratory Therapy support as indicated  Outcome: Progressing     Problem: GENITOURINARY - ADULT  Goal: Maintains or returns to baseline urinary function  Description: INTERVENTIONS:  - Assess urinary function  - Encourage oral fluids to ensure adequate hydration if ordered  - Administer IV fluids as ordered to ensure adequate hydration  - Administer ordered medications as needed  - Offer frequent toileting  - Follow urinary retention protocol if ordered  Outcome: Progressing  Goal: Urinary catheter remains patent  Description: INTERVENTIONS:  - Assess patency of urinary catheter  - Follow guidelines for intermittent irrigation of non-functioning urinary catheter  Outcome: Progressing

## 2023-08-17 NOTE — ANESTHESIA POSTPROCEDURE EVALUATION
Post-Op Assessment Note    CV Status:  Stable    Pain management: adequate     Mental Status:  Alert and awake   Hydration Status:  Euvolemic   PONV Controlled:  Controlled   Airway Patency:  Patent      Post Op Vitals Reviewed: Yes      Staff: Anesthesiologist, CRNA         No notable events documented.     /72 (08/17/23 1736)    Temp      Pulse 101 (08/17/23 1736)   Resp 16 (08/17/23 1736)    SpO2 95 % (08/17/23 1736)

## 2023-08-17 NOTE — PROGRESS NOTES
[address] [facility]  Progress Note  Name: Faith Zarate  MRN: [id number]  Unit/Bed#: E2 -01 I Date of Admission: 8/10/2023   Date of Service: 8/17/2023 I Hospital Day: 7    Assessment/Plan   Hypoglycemia  Assessment & Plan  Unfortunately, patient became acutely hypoglycemic this admission. Anticipate improvement once PEG tube feeds began. We will start D5 half-normal at 30 cc an hour for support, monitor    8/17-update: Spoke with endocrinology and appreciate their recommendations. Recommend holding dextrose and allowing blood glucose to drop below 40. Obtain the following labs: Insulin, C-peptide, cortisol, proinsulin, glucose, growth hormone, Cefol urea screen with urine and plasma. After that, may initiate dextrose drip as needed and endocrinology will follow along. Recent Labs     08/17/23  0542 08/17/23  1125 08/17/23  1545 08/17/23  1552 08/17/23  1630 08/17/23  1738   POCGLU 114 73 58* 77 72 140         Electrolyte imbalance  Assessment & Plan  Replete PRN    Recent Labs     08/15/23  0446 08/16/23  0418 08/17/23  0446   K 4.0 4.1 3.9   MG 1.8* 2.0  --          Closed T12 fracture (HCC)  Assessment & Plan  History of low impact fall that resulted in multiple T-spine fractures  · Receiving pain control with Morphine PRN  · Follows with neurosurgery in the outpatient setting    Dysphagia  Assessment & Plan  Currently on pureed and nectar thick, but still with aspiration. · Appreciate speech recommendations  · Patient and daughter agreeable to PEG tube placement, appreciate GI recommendations. Likely to place tomorrow. · Have consulted gastroenterology, patient n.p.o. at midnight  · Consult to nutrition, appreciate recommendations    Altered mental status  Assessment & Plan  Altered mental status on presentation. Etiology possibly due to hypoglycemia, fatigue, underlying disease.   · Continue supportive care  · Today, appeared alert and oriented X3, improving    History of stroke  Assessment & Plan    · Eliquis was held pending PEG tube placement; restart once PEG tube inserted    Rheumatoid arthritis of multiple sites with negative rheumatoid factor ([address])  Assessment & Plan  History of seronegative multiple joint arthropathy. · Continue Plaquenil    * Acute respiratory failure with hypoxia (HCC)  Assessment & Plan  Acute respiratory failure with hypoxia secondary to aspiration pneumonitis. · Continue daily weaning trials  · Albuterol PRN  · Patient reports she is on 2 L at home/at night; currently on 2 L nasal cannula, improving  · Patient's daughter reports patient likely will need oxygen around-the-clock as she frequently desaturates during the day without oxygen support. · Home O2 eval prior to discharge as patient likely will require oxygen around-the-clock           VTE Pharmacologic Prophylaxis:   Pharmacologic: Apixaban (Eliquis)  Mechanical VTE Prophylaxis in Place: Yes    Patient Centered Rounds: I have performed bedside rounds with nursing staff today. Discussions with Specialists or Other Care Team Provider: Endocrinology, GI    Education and Discussions with Family / Patient: Discussed treatment plan with family and patient who agree with current plan; encouraged to ask questions and participate. Time Spent for Care: 45 minutes. More than 50% of total time spent on counseling and coordination of care as described above. Current Length of Stay: 7 day(s)    Current Patient Status: Inpatient   Certification Statement: The patient will continue to require additional inpatient hospital stay due to Treatment of dysphagia    Discharge Plan: To be determined, likely 24 to 48 hours    Code Status: Level 1 - Full Code      Subjective:   Patient seen and examined at bedside, no acute distress or discomfort noted. Patient did undergo PEG tube placement today. Blood glucose levels dropping and likely secondary to malnutrition.   Did discuss with endocrinology and will allow them to continue dropping without dextrose drip at this time. See instructions above if they should fall below 40. After PEG tube placed, likely start to PEG tube feeds tomorrow and monitor for improvement. Objective:     Vitals:   Temp (24hrs), Av.9 °F (36.6 °C), Min:97.7 °F (36.5 °C), Max:98 °F (36.7 °C)    Temp:  [97.7 °F (36.5 °C)-98 °F (36.7 °C)] 97.7 °F (36.5 °C)  HR:  [] 108  Resp:  [16-20] 16  BP: (101-118)/(70-79) 112/72  SpO2:  [95 %-100 %] 95 %  Body mass index is 20.51 kg/m². Input and Output Summary (last 24 hours): Intake/Output Summary (Last 24 hours) at 2023 1813  Last data filed at 2023 1737  Gross per 24 hour   Intake 550 ml   Output --   Net 550 ml       Physical Exam:     Physical Exam  Vitals and nursing note reviewed. Constitutional:       General: She is not in acute distress. Appearance: She is well-developed. HENT:      Head: Normocephalic and atraumatic. Eyes:      Conjunctiva/sclera: Conjunctivae normal.   Cardiovascular:      Rate and Rhythm: Normal rate and regular rhythm. Heart sounds: No murmur heard. Pulmonary:      Effort: Pulmonary effort is normal. No respiratory distress. Breath sounds: Normal breath sounds. Abdominal:      Palpations: Abdomen is soft. Tenderness: There is no abdominal tenderness. Musculoskeletal:         General: No swelling. Cervical back: Neck supple. Comments: Kyphotic   Skin:     General: Skin is warm and dry. Neurological:      Mental Status: She is alert.       Comments: Dysphonia; left-sided weakness   Psychiatric:         Mood and Affect: Mood normal.           Additional Data:     Labs:    Results from last 7 days   Lab Units 23  0446   WBC Thousand/uL 7.66   HEMOGLOBIN g/dL 9.2*   HEMATOCRIT % 30.6*   PLATELETS Thousands/uL 224   NEUTROS PCT % 67   LYMPHS PCT % 17   MONOS PCT % 15*   EOS PCT % 0     Results from last 7 days   Lab Units 236 08/16/23  1835 08/16/23  0418   SODIUM mmol/L 139  --  141   POTASSIUM mmol/L 3.9  --  4.1   CHLORIDE mmol/L 103  --  104   CO2 mmol/L 26  --  29   BUN mg/dL 12  --  12   CREATININE mg/dL 0.66  --  0.68   ANION GAP mmol/L 10  --  8   CALCIUM mg/dL 8.2*  --  8.0*   ALBUMIN g/dL  --   --  2.6*   TOTAL BILIRUBIN mg/dL  --   --  0.57   ALK PHOS U/L  --   --  59   ALT U/L  --   --  14   AST U/L  --   --  17   GLUCOSE RANDOM mg/dL 73   < > 91    < > = values in this interval not displayed. Results from last 7 days   Lab Units 08/17/23  0446   INR  1.28*     Results from last 7 days   Lab Units 08/17/23  1738 08/17/23  1630 08/17/23  1552 08/17/23  1545 08/17/23  1125 08/17/23  0542 08/16/23  2340 08/16/23  1725 08/16/23  1652 08/16/23  1633 08/16/23  1214 08/16/23  0542   POC GLUCOSE mg/dl 140 72 77 58* 73 114 92 27* 61* 29* 61* 86         Results from last 7 days   Lab Units 08/15/23  0446 08/11/23  0508   PROCALCITONIN ng/ml 0.08 0.08           * I Have Reviewed All Lab Data Listed Above. * Additional Pertinent Lab Tests Reviewed:  All Labs Within Last 24 Hours Reviewed    Imaging:    Imaging Reports Reviewed Today Include:   Imaging Personally Reviewed by Myself Includes:      Recent Cultures (last 7 days):     Results from last 7 days   Lab Units 08/11/23  0813   LEGIONELLA URINARY ANTIGEN  Negative       Last 24 Hours Medication List:   Current Facility-Administered Medications   Medication Dose Route Frequency Provider Last Rate   • albuterol  2 puff Inhalation Q6H PRN Lianet Marroquin PA-C     • aluminum-magnesium hydroxide-simethicone  30 mL Oral Q4H PRN Marjan Lee PA-C     • [START ON 8/18/2023] apixaban  5 mg Per PEG Tube BID Kelechi Berman MD     • buPROPion  50 mg Oral BID Lianet Marroquin PA-C     • cefazolin  2,000 mg Intravenous Once Paul Hammer MD     • cinacalcet  30 mg Oral Daily With Breakfast Lianet Marroquin PA-C     • DULoxetine  60 mg Oral BID Minnie Valdes PA-C     • hydroxychloroquine  200 mg Oral BID With Meals [address] Spurling, MD     • metoprolol succinate  25 mg Oral Daily Lianet Marroquin PA-C     • morphine injection  2 mg Intravenous Q4H PRN Mychal Cheek MD     • morphine injection  4 mg Intravenous Q4H PRN Mychal Cheek MD     • ondansetron  4 mg Intravenous Q6H PRN Ashanti Chan PA-C     • pantoprazole  40 mg Intravenous Q24H [facility] Mychal Cheek MD          Today, Patient Was Seen By: Ketan Giordano MD    ** Please Note: Dictation voice to text software may have been used in the creation of this document.  **

## 2023-08-17 NOTE — ASSESSMENT & PLAN NOTE
Replete PRN    Recent Labs     08/15/23  0446 08/16/23  0418 08/17/23  0446   K 4.0 4.1 3.9   MG 1.8* 2.0  --

## 2023-08-18 NOTE — ASSESSMENT & PLAN NOTE
Replete PRN    Recent Labs     08/16/23  0418 08/17/23  0446 08/18/23  0455   K 4.1 3.9 4.3   MG 2.0  --   --

## 2023-08-18 NOTE — PLAN OF CARE
Problem: PAIN - ADULT  Goal: Verbalizes/displays adequate comfort level or baseline comfort level  Description: Interventions:  - Encourage patient to monitor pain and request assistance  - Assess pain using appropriate pain scale  - Administer analgesics based on type and severity of pain and evaluate response  - Implement non-pharmacological measures as appropriate and evaluate response  - Consider cultural and social influences on pain and pain management  - Notify physician/advanced practitioner if interventions unsuccessful or patient reports new pain  Outcome: Progressing     Problem: INFECTION - ADULT  Goal: Absence or prevention of progression during hospitalization  Description: INTERVENTIONS:  - Assess and monitor for signs and symptoms of infection  - Monitor lab/diagnostic results  - Monitor all insertion sites, i.e. indwelling lines, tubes, and drains  - Monitor endotracheal if appropriate and nasal secretions for changes in amount and color  - [place] appropriate cooling/warming therapies per order  - Administer medications as ordered  - Instruct and encourage patient and family to use good hand hygiene technique  - Identify and instruct in appropriate isolation precautions (contact isolation for CRE) for identified infection/condition  Outcome: Progressing     Problem: MOBILITY - ADULT  Goal: Maintain or return to baseline ADL function  Description: INTERVENTIONS:  - Educate patient/family on patient safety including physical limitations  - Instruct patient to call for assistance with activity   - Consult OT/PT to assist with strengthening/mobility   - Keep Call bell within reach  - Keep bed low and locked with side rails adjusted as appropriate  - Keep care items and personal belongings within reach  - Initiate and maintain comfort rounds  - Make Fall Risk Sign visible to staff  - Offer Toileting every 2 Hours, in advance of need  - Initiate/Maintain bed/chair alarm  - Obtain necessary fall risk management equipment: bed/chair alarm, call bell, gripper socks, walker, bedside commode  - Apply yellow socks and bracelet for high fall risk patients  - Consider moving patient to room near nurses station  Outcome: Progressing

## 2023-08-18 NOTE — PROGRESS NOTES
[facility] - Progress Note  Vera Dave 70 y.o. female MRN: [id number]  Unit/Bed#: E2 -01 Encounter: [id number]      ASSESSMENT & PLAN:    70 y.o. old female with PMH of RA, SHAR on 2 L NC nocturnally, hx of CVA w mechanical thrombectomy 5/2023 on apixaban 5 mg BID presenting with recurrent aspiration events, with GI consulted for consideration of feeding tube placement.     Dysphagia  · S/p PEG tube placement 8/17/23  · PEG external bumper adjustable, freely rotating  · Flushed with 50 cc of water without any difficulty   · Peg can be used for water, medication and tube feeds. · Okay to resume anticoagulation    GI will sign off at this time. Please call with any questions or concerns. ______________________________________________________________________    SUBJECTIVE:     No acute events.      Scheduled Meds:  Current Facility-Administered Medications   Medication Dose Route Frequency Provider Last Rate   • albuterol  2 puff Inhalation Q6H PRN Lianet Marroquin PA-C     • aluminum-magnesium hydroxide-simethicone  30 mL Oral Q4H PRN Abdullahi Gallegos PA-C     • apixaban  5 mg Per PEG Tube BID Nida Ferraro MD     • buPROPion  50 mg Oral BID Diaz Alaniz PA-C     • cinacalcet  30 mg Oral Daily With Breakfast Lianet Marroquin PA-C     • dextrose 5 % and sodium chloride 0.45 %  50 mL/hr Intravenous Continuous Nida Ferraro MD 50 mL/hr (08/18/23 5659)   • DULoxetine  60 mg Oral BID Lianet Marroquin PA-C     • hydroxychloroquine  200 mg Oral BID With Meals Cherise Jimenez MD     • metoprolol succinate  25 mg Oral Daily Lianet Marroquin PA-C     • morphine injection  2 mg Intravenous Q4H PRN Nida Ferraro MD     • morphine injection  4 mg Intravenous Q4H PRN Nida Ferraro MD     • ondansetron  4 mg Intravenous Q6H PRN Alex Covarrubias PA-C     • pantoprazole  40 mg Intravenous Q24H [address] [place], MD       Continuous Infusions:dextrose 5 % and sodium chloride 0.45 %, 50 mL/hr, Last Rate: 50 mL/hr (08/18/23 0951)      PRN Meds:.•  albuterol  •  aluminum-magnesium hydroxide-simethicone  •  morphine injection  •  morphine injection  •  ondansetron    OBJECTIVE:     Objective   Blood pressure 125/63, pulse (!) 111, temperature 97.9 °F (36.6 °C), temperature source Temporal, resp. rate 18, height 5' 4" (1.626 m), weight 54.5 kg (120 lb 2.4 oz), SpO2 96 %. Body mass index is 20.62 kg/m². Intake/Output Summary (Last 24 hours) at 8/18/2023 1057  Last data filed at 8/17/2023 1807  Gross per 24 hour   Intake 550 ml   Output 100 ml   Net 450 ml       PHYSICAL EXAM:   General Appearance: Awake and alert, in no acute distress  Abdomen: Soft, non-tender, non-distended; no masses or no organomegaly; PEG site clean and intact, no underlying erythema    Invasive Devices     Peripheral Intravenous Line  Duration           Peripheral IV 08/17/23 Proximal;Right;Upper;Ventral (anterior) Arm <1 day          Drain  Duration           Gastrostomy/Enterostomy Percutaneous Endoscopic Gastrostomy (PEG) 20 Fr.  Umbilicus <1 day                LAB RESULTS:      Lab Units 08/18/23  1022 08/18/23  0455 08/17/23  0446 08/16/23  1835 08/16/23  0418 08/15/23  0446 08/14/23  0518 08/12/23  0513 08/11/23  0508 07/10/23  0501 07/08/23  0537   SODIUM mmol/L  --  139 139  --  141 141 141 144 131*   < > 144   POTASSIUM mmol/L  --  4.3 3.9  --  4.1 4.0 3.6 4.1 3.1*   < > 3.2*   CHLORIDE mmol/L  --  104 103  --  104 107 110* 110* 101   < > 114*   CO2 mmol/L  --  24 26  --  29 25 27 28 22   < > 25   CO2, I-STAT   --   --   --   --   --   --   --   --   --    < >  --    BUN mg/dL  --  14 12  --  12 12 12 14 12   < > 15   CREATININE mg/dL  --  0.82 0.66  --  0.68 0.60 0.61 0.91 0.94   < > 0.88   GLUCOSE RANDOM mg/dL 70 62* 73 124 91 100 88 94 551*   < > 87   CALCIUM mg/dL  --  9.2 8.2*  --  8.0* 8.0* 7.8* 8.7 6.8*   < > 8.3   MAGNESIUM mg/dL  --   --   --   --  2.0 1.8* 1.5* 2.7 1.4*   < > 1.9   PHOSPHORUS mg/dL  -- --   --   --  3.9  --  2.3 2.4 3.0  --  3.3    < > = values in this interval not displayed. Lab Units 08/16/23  0418 08/10/23  1558 07/22/23  0916 07/08/23  0537 06/30/23  1340   TOTAL PROTEIN g/dL 5.6* 5.4* 5.7* 5.1* 5.9*   ALBUMIN g/dL 2.6* 2.8* 2.5* 2.4* 3.1*   TOTAL BILIRUBIN mg/dL 0.57 0.63 0.50 0.74 0.64   BILIRUBIN DIRECT mg/dL  --   --   --   --  0.11   AST U/L 17 23 21 15 29   ALT U/L 14 15 15 21 13   ALK PHOS U/L 59 65 55 54 51           Lab Units 08/18/23  0455 08/17/23  0446 08/16/23  0418 08/15/23  0446 08/14/23  0518   WBC Thousand/uL 8.94 7.66 7.66 10.35* 8.74   HEMOGLOBIN g/dL 10.4* 9.2* 9.3* 9.4* 9.0*   HEMATOCRIT % 34.6* 30.6* 29.8* 30.3* 29.4*   PLATELETS Thousands/uL 231 224 230 266 211   MCV fL 107* 106* 104* 105* 105*       Lab Results   Component Value Date    IRON 77 06/25/2021    TIBC 267 06/25/2021    FERRITIN 98 06/25/2021       Lab Results   Component Value Date    INR 1.28 (H) 08/17/2023    INR 1.48 (H) 08/16/2023    INR 1.34 (H) 08/10/2023    PROTIME 15.9 (H) 08/17/2023    PROTIME 17.9 (H) 08/16/2023    PROTIME 16.5 (H) 08/10/2023       RADIOLOGY RESULTS:   Procedure: EGD Peg Tube Placement    Result Date: 8/17/2023  Narrative: Table formatting from the original result was not included. [address] [facility] [address] [place] [address] Street [phone] DATE OF SERVICE: 8/17/23 PHYSICIAN(S): Attending: Hebert Pierre MD Fellow: Courtney Toribio MD INDICATION: Dysphagia, Dysphagia, unspecified type POST-OP DIAGNOSIS: See the impression below. PREPROCEDURE: Informed consent was obtained for the procedure, including sedation. Risks of perforation, hemorrhage, adverse drug reaction and aspiration were discussed. The patient was placed in the left lateral decubitus position. Patient was explained about the risks and benefits of the procedure. Risks including but not limited to bleeding, infection, and perforation were explained in detail.  Also explained about less than 100% sensitivity with the exam and other alternatives. PROCEDURE: EGD DETAILS OF PROCEDURE: Patient was taken to the procedure room where a time out was performed to confirm correct patient and correct procedure. The patient underwent monitored anesthesia care, which was administered by an anesthesia professional. The patient's blood pressure, heart rate, level of consciousness, respirations, oxygen and ETCO2 were monitored throughout the procedure. The scope was advanced to the second part of the duodenum. Retroflexion was performed in the fundus. The patient experienced no blood loss. The procedure was not difficult. The patient tolerated the procedure well. There were no apparent adverse events. ANESTHESIA INFORMATION: ASA: IV Anesthesia Type: IV Sedation with Anesthesia MEDICATIONS: No administrations occurring from 1645 to 1719 on 08/17/23 FINDINGS: Z-line is 38 cm from the incisors. Mild, patchy erythematous and granular mucosa in the fundus of the stomach and body of the stomach PEG tube measuring 20 Fr successfully placed in the stomach using a rigid internal bolster via the pull technique after the site was identified via transillumination, visualized indentation and needle passed through abdominal wall; distance from external bolster to external end of tube: 3 cm; scope reinserted and tube rotated freely to confirm placement. SPECIMENS: * No specimens in log *     Impression: Mild erythematous and granular mucosa in the fundus of the stomach and body of the stomach PEG tube placed in the stomach RECOMMENDATION:  Okay to use PEG tube for medications and water flushes in 4 hours. Hold tube feeds until PEG tube has been evaluated by GI team tomorrow. Continue tube care. Return to floors for continued care by primary team. Please contact the GI fellow on-call via [employer] with any questions or concerns.     Hebert Pierre MD     Narrative/Impressions - 3 day look back     Hiram Hannah M.D. PGY-5 Gastroenterology Fellow  [facility]  Available on [email or web address]

## 2023-08-18 NOTE — PHYSICAL THERAPY NOTE
Physical Therapy Cancellation Note      PT session cancelled as pt. Inappropriate for therapy at this time due to blood sugar being 43 per RN. Will continue to follow as appropriate.

## 2023-08-18 NOTE — PROGRESS NOTES
[facility]  Progress Note  Name: Vy Ge  MRN: [id number]  Unit/Bed#: E2 -01 I Date of Admission: 8/10/2023   Date of Service: 8/18/2023 I Hospital Day: 8    Assessment/Plan   Hypoglycemia  Assessment & Plan  Unfortunately, patient became acutely hypoglycemic this admission. Anticipate improvement once PEG tube feeds began. We will start D5 half-normal at 30 cc an hour for support, monitor    8/17-update: Spoke with endocrinology and appreciate their recommendations. Recommend holding dextrose and allowing blood glucose to drop below 40. Obtain the following labs: Insulin, C-peptide, cortisol, proinsulin, glucose, growth hormone, Cefol urea screen with urine and plasma. After that, may initiate dextrose drip as needed and endocrinology will follow along. Recent Labs     08/18/23  0910 08/18/23  1050 08/18/23  1110 08/18/23  1139 08/18/23  1229 08/18/23  1504   POCGLU 43* <20* 21* <20* 254* 133         Electrolyte imbalance  Assessment & Plan  Replete PRN    Recent Labs     08/16/23  0418 08/17/23  0446 08/18/23  0455   K 4.1 3.9 4.3   MG 2.0  --   --          Closed T12 fracture (HCC)  Assessment & Plan  History of low impact fall that resulted in multiple T-spine fractures  · Receiving pain control with Morphine PRN  · Follows with neurosurgery in the outpatient setting    Dysphagia  Assessment & Plan  Currently on pureed and nectar thick, but still with aspiration. · Appreciate speech recommendations  · Patient and daughter agreeable to PEG tube placement, appreciate GI recommendations. Likely to place tomorrow. · Have consulted gastroenterology, patient n.p.o. at midnight  · Consult to nutrition, appreciate recommendations    Altered mental status  Assessment & Plan  Altered mental status on presentation. Etiology possibly due to hypoglycemia, fatigue, underlying disease.   · Continue supportive care  · Today, appeared alert and oriented X3, improving    History of stroke  Assessment & Plan    · Eliquis was held pending PEG tube placement; restart once PEG tube inserted    Rheumatoid arthritis of multiple sites with negative rheumatoid factor ([address])  Assessment & Plan  History of seronegative multiple joint arthropathy. · Continue Plaquenil    * Acute respiratory failure with hypoxia (HCC)  Assessment & Plan  Acute respiratory failure with hypoxia secondary to aspiration pneumonitis. · Continue daily weaning trials  · Albuterol PRN  · Patient reports she is on 2 L at home/at night; currently on 2 L nasal cannula, improving  · Patient's daughter reports patient likely will need oxygen around-the-clock as she frequently desaturates during the day without oxygen support. · Home O2 eval prior to discharge as patient likely will require oxygen around-the-clock           VTE Pharmacologic Prophylaxis:   Pharmacologic: Apixaban (Eliquis)  Mechanical VTE Prophylaxis in Place: Yes    Patient Centered Rounds: I have performed bedside rounds with nursing staff today. Discussions with Specialists or Other Care Team Provider: Gastroenterology    Education and Discussions with Family / Patient: Discussed treatment plan with family and patient who agree with current plan; encouraged to ask questions and participate. Time Spent for Care: 45 minutes. More than 50% of total time spent on counseling and coordination of care as described above. Current Length of Stay: 8 day(s)    Current Patient Status: Inpatient   Certification Statement: The patient will continue to require additional inpatient hospital stay due to Treatment of dysphagia    Discharge Plan: To be determined likely 24 to 48 hours    Code Status: Level 1 - Full Code      Subjective:   Patient seen and examined bedside, no acute distress or discomfort noted. Patient had PEG tube inserted today as well as midline.   Blood glucose levels overall more stable, will discontinue dextrose drip now that PEG tube feeds are running. Monitor and restart as needed. Objective:     Vitals:   Temp (24hrs), Av.7 °F (36.5 °C), Min:97 °F (36.1 °C), Max:98.1 °F (36.7 °C)    Temp:  [97 °F (36.1 °C)-98.1 °F (36.7 °C)] 97 °F (36.1 °C)  HR:  [] 102  Resp:  [18-22] 20  BP: (110-128)/(56-81) 110/56  SpO2:  [96 %-100 %] 100 %  Body mass index is 20.62 kg/m². Input and Output Summary (last 24 hours): Intake/Output Summary (Last 24 hours) at 2023 1825  Last data filed at 2023 1751  Gross per 24 hour   Intake 368 ml   Output 309 ml   Net 59 ml       Physical Exam:     Physical Exam  Vitals and nursing note reviewed. Constitutional:       General: She is not in acute distress. Appearance: She is well-developed. HENT:      Head: Normocephalic and atraumatic. Eyes:      Conjunctiva/sclera: Conjunctivae normal.   Cardiovascular:      Rate and Rhythm: Normal rate and regular rhythm. Heart sounds: No murmur heard. Pulmonary:      Effort: Pulmonary effort is normal. No respiratory distress. Breath sounds: Normal breath sounds. Abdominal:      Palpations: Abdomen is soft. Tenderness: There is no abdominal tenderness. Comments: PEG tube   Musculoskeletal:         General: No swelling. Cervical back: Neck supple. Comments: Kyphotic   Skin:     General: Skin is warm and dry. Neurological:      Mental Status: She is alert.       Comments: Dysphonia; left-sided weakness   Psychiatric:         Mood and Affect: Mood normal.           Additional Data:     Labs:    Results from last 7 days   Lab Units 23  0455   WBC Thousand/uL 8.94   HEMOGLOBIN g/dL 10.4*   HEMATOCRIT % 34.6*   PLATELETS Thousands/uL 231   NEUTROS PCT % 79*   LYMPHS PCT % 12*   MONOS PCT % 9   EOS PCT % 0     Results from last 7 days   Lab Units 23  1022 23  0455 23  1835 23  0418   SODIUM mmol/L  --  139   < > 141   POTASSIUM mmol/L  --  4.3   < > 4.1   CHLORIDE mmol/L  -- 104   < > 104   CO2 mmol/L  --  24   < > 29   BUN mg/dL  --  14   < > 12   CREATININE mg/dL  --  0.82   < > 0.68   ANION GAP mmol/L  --  11   < > 8   CALCIUM mg/dL  --  9.2   < > 8.0*   ALBUMIN g/dL  --   --   --  2.6*   TOTAL BILIRUBIN mg/dL  --   --   --  0.57   ALK PHOS U/L  --   --   --  59   ALT U/L  --   --   --  14   AST U/L  --   --   --  17   GLUCOSE RANDOM mg/dL 70 62*   < > 91    < > = values in this interval not displayed. Results from last 7 days   Lab Units 08/17/23  0446   INR  1.28*     Results from last 7 days   Lab Units 08/18/23  1504 08/18/23  1229 08/18/23  1139 08/18/23  1110 08/18/23  1050 08/18/23  0910 08/18/23  0655 08/18/23  0543 08/18/23  0041 08/17/23  2147 08/17/23  1738 08/17/23  1630   POC GLUCOSE mg/dl 133 254* <20* 21* <20* 43* 60* 64* 78 87 140 72         Results from last 7 days   Lab Units 08/15/23  0446   PROCALCITONIN ng/ml 0.08           * I Have Reviewed All Lab Data Listed Above. * Additional Pertinent Lab Tests Reviewed:  All Labs Within Last 24 Hours Reviewed    Imaging:    Imaging Reports Reviewed Today Include:   Imaging Personally Reviewed by Myself Includes:      Recent Cultures (last 7 days):           Last 24 Hours Medication List:   Current Facility-Administered Medications   Medication Dose Route Frequency Provider Last Rate   • albuterol  2 puff Inhalation Q6H PRN Lianet Marroquin PA-C     • aluminum-magnesium hydroxide-simethicone  30 mL Oral Q4H PRN Rory Gibson PA-C     • apixaban  5 mg Per PEG Tube BID Etienne Danielson MD     • buPROPion  50 mg Oral BID Lianet Marroquin PA-C     • cinacalcet  30 mg Oral Daily With Breakfast Lianet Marroquin PA-C     • DULoxetine  60 mg Oral BID Lianet Marroquin PA-C     • hydroxychloroquine  200 mg Oral BID With Meals Floyce Shady, MD     • metoprolol succinate  25 mg Oral Daily Lianet Marroquin PA-C     • morphine injection  2 mg Intravenous Q4H PRN Etienne Danielson MD     • morphine injection  4 mg Intravenous Q4H PRN Dannie Mcghee MD     • ondansetron  4 mg Intravenous Q6H PRN Dick Gill PA-C     • pantoprazole  40 mg Intravenous Q24H [address]  Dannie Mcghee MD          Today, Patient Was Seen By: Alexandria Martinez MD    ** Please Note: Dictation voice to text software may have been used in the creation of this document.  **

## 2023-08-18 NOTE — ASSESSMENT & PLAN NOTE
Unfortunately, patient became acutely hypoglycemic this admission. Anticipate improvement once PEG tube feeds began. We will start D5 half-normal at 30 cc an hour for support, monitor    8/17-update: Spoke with endocrinology and appreciate their recommendations. Recommend holding dextrose and allowing blood glucose to drop below 40. Obtain the following labs: Insulin, C-peptide, cortisol, proinsulin, glucose, growth hormone, Cefol urea screen with urine and plasma. After that, may initiate dextrose drip as needed and endocrinology will follow along.     Recent Labs     08/18/23  0910 08/18/23  1050 08/18/23  1110 08/18/23  1139 08/18/23  1229 08/18/23  1504   POCGLU 43* <20* 21* <20* 254* 133

## 2023-08-19 NOTE — ASSESSMENT & PLAN NOTE
Currently on pureed and nectar thick, but still with aspiration. · Appreciate speech recommendations  · Patient and daughter agreeable to PEG tube placement, appreciate GI recommendations. Likely to place tomorrow.   · Have consulted gastroenterology, PEG tube placed and functioning well  · Consult to nutrition, appreciate recommendations-have increased PEG tube feeds to 40 cc an hour today, monitor

## 2023-08-19 NOTE — PROGRESS NOTES
[facility]  Progress Note  Name: Amari Galeana  MRN: [id number]  Unit/Bed#: E2 -01 I Date of Admission: 8/10/2023   Date of Service: 8/19/2023 I Hospital Day: 9    Assessment/Plan   Hypoglycemia  Assessment & Plan  Unfortunately, patient became acutely hypoglycemic this admission. Anticipate improvement once PEG tube feeds began. We will start D5 half-normal at 30 cc an hour for support, monitor    8/17-update: Spoke with endocrinology and appreciate their recommendations. Recommend holding dextrose and allowing blood glucose to drop below 40. Obtain the following labs: Insulin, C-peptide, cortisol, proinsulin, glucose, growth hormone, Cefol urea screen with urine and plasma. After that, may initiate dextrose drip as needed and endocrinology will follow along. 8/19-blood glucose level stable with tube feeds; continue and monitor    Recent Labs     08/18/23  1504 08/18/23  1902 08/19/23  0025 08/19/23  0633 08/19/23  1201 08/19/23  1749   POCGLU 133 117 115 136 108 145*         Electrolyte imbalance  Assessment & Plan  Replete PRN    Recent Labs     08/17/23  0446 08/18/23  0455 08/19/23  0637   K 3.9 4.3 3.9         Closed T12 fracture (HCC)  Assessment & Plan  History of low impact fall that resulted in multiple T-spine fractures  · Receiving pain control with Morphine PRN  · Follows with neurosurgery in the outpatient setting    Dysphagia  Assessment & Plan  Currently on pureed and nectar thick, but still with aspiration. · Appreciate speech recommendations  · Patient and daughter agreeable to PEG tube placement, appreciate GI recommendations. Likely to place tomorrow. · Have consulted gastroenterology, PEG tube placed and functioning well  · Consult to nutrition, appreciate recommendations-have increased PEG tube feeds to 40 cc an hour today, monitor    Altered mental status  Assessment & Plan  Altered mental status on presentation.  Etiology possibly due to hypoglycemia, fatigue, underlying disease. · Continue supportive care  · Today, appeared alert and oriented X3, improving    History of stroke  Assessment & Plan    · Eliquis was held pending PEG tube placement; restart once PEG tube inserted    Rheumatoid arthritis of multiple sites with negative rheumatoid factor ([address])  Assessment & Plan  History of seronegative multiple joint arthropathy. · Continue Plaquenil    * Acute respiratory failure with hypoxia (HCC)  Assessment & Plan  Acute respiratory failure with hypoxia secondary to aspiration pneumonitis. · Continue daily weaning trials  · Albuterol PRN  · Patient reports she is on 2 L at home/at night; currently on 2 L nasal cannula, improving  · Patient's daughter reports patient likely will need oxygen around-the-clock as she frequently desaturates during the day without oxygen support. · Home O2 eval prior to discharge as patient likely will require oxygen around-the-clock             VTE Pharmacologic Prophylaxis:   Pharmacologic: Apixaban (Eliquis)  Mechanical VTE Prophylaxis in Place: Yes    Patient Centered Rounds: I have performed bedside rounds with nursing staff today. Discussions with Specialists or Other Care Team Provider:     Education and Discussions with Family / Patient: Care plan discussed with patient who voiced understanding and agrees with recommendations. Time Spent for Care: 45 minutes. More than 50% of total time spent on counseling and coordination of care as described above. Current Length of Stay: 9 day(s)    Current Patient Status: Inpatient   Certification Statement: The patient will continue to require additional inpatient hospital stay due to Treatment of dysphagia    Discharge Plan: To be determined, likely tomorrow    Code Status: Level 1 - Full Code      Subjective:   Patient seen and examined bedside, no acute distress or discomfort noted. PEG tube appears to be working well and will advance tube feeds today. Continue to treat nausea and monitor on morning labs. Likely stable for discharge in 24 to 48 hours. Objective:     Vitals:   Temp (24hrs), Av.9 °F (36.6 °C), Min:97.2 °F (36.2 °C), Max:98.9 °F (37.2 °C)    Temp:  [97.2 °F (36.2 °C)-98.9 °F (37.2 °C)] 97.5 °F (36.4 °C)  HR:  [107-117] 107  Resp:  [19-20] 20  BP: (101-122)/(65-68) 122/67  SpO2:  [95 %-99 %] 99 %  Body mass index is 20.89 kg/m². Input and Output Summary (last 24 hours): Intake/Output Summary (Last 24 hours) at 2023 1849  Last data filed at 2023 1754  Gross per 24 hour   Intake 352 ml   Output 8 ml   Net 344 ml       Physical Exam:     Physical Exam  Vitals and nursing note reviewed. Constitutional:       General: She is not in acute distress. Appearance: She is well-developed. HENT:      Head: Normocephalic and atraumatic. Eyes:      Conjunctiva/sclera: Conjunctivae normal.   Cardiovascular:      Rate and Rhythm: Normal rate and regular rhythm. Heart sounds: No murmur heard. Pulmonary:      Effort: Pulmonary effort is normal. No respiratory distress. Breath sounds: Normal breath sounds. Abdominal:      Palpations: Abdomen is soft. Tenderness: There is no abdominal tenderness. Comments: PEG tube   Musculoskeletal:         General: No swelling. Cervical back: Neck supple. Comments: Kyphotic   Skin:     General: Skin is warm and dry. Neurological:      Mental Status: She is alert.       Comments: Dysphonia; left-sided weakness   Psychiatric:         Mood and Affect: Mood normal.           Additional Data:     Labs:    Results from last 7 days   Lab Units 23  0637   WBC Thousand/uL 11.29*   HEMOGLOBIN g/dL 8.6*   HEMATOCRIT % 28.3*   PLATELETS Thousands/uL 211   NEUTROS PCT % 76*   LYMPHS PCT % 10*   MONOS PCT % 13*   EOS PCT % 0     Results from last 7 days   Lab Units 23  0637 23  1835 23  0418   SODIUM mmol/L 137   < > 141   POTASSIUM mmol/L 3.9   < > 4.1 CHLORIDE mmol/L 104   < > 104   CO2 mmol/L 25   < > 29   BUN mg/dL 17   < > 12   CREATININE mg/dL 0.89   < > 0.68   ANION GAP mmol/L 8   < > 8   CALCIUM mg/dL 8.3*   < > 8.0*   ALBUMIN g/dL  --   --  2.6*   TOTAL BILIRUBIN mg/dL  --   --  0.57   ALK PHOS U/L  --   --  59   ALT U/L  --   --  14   AST U/L  --   --  17   GLUCOSE RANDOM mg/dL 124   < > 91    < > = values in this interval not displayed. Results from last 7 days   Lab Units 08/17/23  0446   INR  1.28*     Results from last 7 days   Lab Units 08/19/23  1749 08/19/23  1201 08/19/23  0633 08/19/23  0025 08/18/23  1902 08/18/23  1504 08/18/23  1229 08/18/23  1139 08/18/23  1110 08/18/23  1050 08/18/23  0910 08/18/23  0655   POC GLUCOSE mg/dl 145* 108 136 115 117 133 254* <20* 21* <20* 43* 60*         Results from last 7 days   Lab Units 08/15/23  0446   PROCALCITONIN ng/ml 0.08           * I Have Reviewed All Lab Data Listed Above. * Additional Pertinent Lab Tests Reviewed:  All Labs Within Last 24 Hours Reviewed    Imaging:    Imaging Reports Reviewed Today Include:   Imaging Personally Reviewed by Myself Includes:      Recent Cultures (last 7 days):           Last 24 Hours Medication List:   Current Facility-Administered Medications   Medication Dose Route Frequency Provider Last Rate   • albuterol  2 puff Inhalation Q6H PRN Lianet Marroquin PA-C     • aluminum-magnesium hydroxide-simethicone  30 mL Oral Q4H PRN Alyssa Kearney PA-C     • apixaban  5 mg Per PEG Tube BID Darren Campbell MD     • buPROPion  50 mg Oral BID Lianet Marroquin PA-C     • cinacalcet  30 mg Oral Daily With Breakfast Lianet Marroquin PA-C     • DULoxetine  60 mg Oral BID Lianet Marroquin PA-C     • hydroxychloroquine  200 mg Oral BID With Meals Dana Saunders MD     • metoprolol succinate  25 mg Oral Daily Lianet Marroquin PA-C     • morphine injection  2 mg Intravenous Q4H PRN Darren Campbell MD     • morphine injection  4 mg Intravenous Q4H PRN Darren Campbell MD     • ondansetron  4 mg Intravenous Q6H PRN Alex Covarrubias PA-C     • pantoprazole  40 mg Intravenous Q24H [address] [place], MD          Today, Patient Was Seen By: Raymond Zurita MD    ** Please Note: Dictation voice to text software may have been used in the creation of this document.  **

## 2023-08-19 NOTE — ASSESSMENT & PLAN NOTE
Unfortunately, patient became acutely hypoglycemic this admission. Anticipate improvement once PEG tube feeds began. We will start D5 half-normal at 30 cc an hour for support, monitor    8/17-update: Spoke with endocrinology and appreciate their recommendations. Recommend holding dextrose and allowing blood glucose to drop below 40. Obtain the following labs: Insulin, C-peptide, cortisol, proinsulin, glucose, growth hormone, Cefol urea screen with urine and plasma. After that, may initiate dextrose drip as needed and endocrinology will follow along.     8/19-blood glucose level stable with tube feeds; continue and monitor    Recent Labs     08/18/23  1504 08/18/23  1902 08/19/23  0025 08/19/23  0633 08/19/23  1201 08/19/23  1749   POCGLU 133 117 115 136 108 145*

## 2023-08-19 NOTE — PLAN OF CARE
Problem: Potential for Falls  Goal: Patient will remain free of falls  Description: INTERVENTIONS:  - Educate patient/family on patient safety including physical limitations  - Instruct patient to call for assistance with activity   - Consult OT/PT to assist with strengthening/mobility   - Keep Call bell within reach  - Keep bed low and locked with side rails adjusted as appropriate  - Keep care items and personal belongings within reach  - Initiate and maintain comfort rounds  - Make Fall Risk Sign visible to staff  - Offer Toileting every 2 Hours, in advance of need  - Initiate/Maintain bed/chair alarm  - Obtain necessary fall risk management equipment: bed/chair alarm, call bell, gripper socks, walker, bedside commode  - Apply yellow socks and bracelet for high fall risk patients  - Consider moving patient to room near nurses station  Outcome: Progressing     Problem: MOBILITY - ADULT  Goal: Maintain or return to baseline ADL function  Description: INTERVENTIONS:  - Educate patient/family on patient safety including physical limitations  - Instruct patient to call for assistance with activity   - Consult OT/PT to assist with strengthening/mobility   - Keep Call bell within reach  - Keep bed low and locked with side rails adjusted as appropriate  - Keep care items and personal belongings within reach  - Initiate and maintain comfort rounds  - Make Fall Risk Sign visible to staff  - Offer Toileting every 2 Hours, in advance of need  - Initiate/Maintain bed/chair alarm  - Obtain necessary fall risk management equipment: bed/chair alarm, call bell, gripper socks, walker, bedside commode  - Apply yellow socks and bracelet for high fall risk patients  - Consider moving patient to room near nurses station  Outcome: Progressing  Goal: Maintains/Returns to pre admission functional level  Description: INTERVENTIONS:  - Perform BMAT or MOVE assessment daily.   - Set and communicate daily mobility goal to care team and patient. - Collaborate with rehabilitation services on mobility goals if consulted  - Reposition patient every 2 hours.   - Dangle patient 3 times a day  - Stand patient 3 times a day  - Ambulate patient 3 times a day  - Out of bed to chair 3 times a day   - Out of bed for meals  - Out of bed for toileting  - Record patient progress and toleration of activity level   Outcome: Progressing     Problem: PAIN - ADULT  Goal: Verbalizes/displays adequate comfort level or baseline comfort level  Description: Interventions:  - Encourage patient to monitor pain and request assistance  - Assess pain using appropriate pain scale  - Administer analgesics based on type and severity of pain and evaluate response  - Implement non-pharmacological measures as appropriate and evaluate response  - Consider cultural and social influences on pain and pain management  - Notify physician/advanced practitioner if interventions unsuccessful or patient reports new pain  Outcome: Progressing     Problem: INFECTION - ADULT  Goal: Absence or prevention of progression during hospitalization  Description: INTERVENTIONS:  - Assess and monitor for signs and symptoms of infection  - Monitor lab/diagnostic results  - Monitor all insertion sites, i.e. indwelling lines, tubes, and drains  - Monitor endotracheal if appropriate and nasal secretions for changes in amount and color  - [place] appropriate cooling/warming therapies per order  - Administer medications as ordered  - Instruct and encourage patient and family to use good hand hygiene technique  - Identify and instruct in appropriate isolation precautions (contact isolation for CRE) for identified infection/condition  Outcome: Progressing     Problem: SAFETY ADULT  Goal: Patient will remain free of falls  Description: INTERVENTIONS:  - Educate patient/family on patient safety including physical limitations  - Instruct patient to call for assistance with activity   - Consult OT/PT to assist with strengthening/mobility   - Keep Call bell within reach  - Keep bed low and locked with side rails adjusted as appropriate  - Keep care items and personal belongings within reach  - Initiate and maintain comfort rounds  - Make Fall Risk Sign visible to staff  - Offer Toileting every 2 Hours, in advance of need  - Initiate/Maintain bed/chair alarm  - Obtain necessary fall risk management equipment: bed/chair alarm, call bell, gripper socks, walker, bedside commode  - Apply yellow socks and bracelet for high fall risk patients  - Consider moving patient to room near nurses station  Outcome: Progressing  Goal: Maintain or return to baseline ADL function  Description: INTERVENTIONS:  - Educate patient/family on patient safety including physical limitations  - Instruct patient to call for assistance with activity   - Consult OT/PT to assist with strengthening/mobility   - Keep Call bell within reach  - Keep bed low and locked with side rails adjusted as appropriate  - Keep care items and personal belongings within reach  - Initiate and maintain comfort rounds  - Make Fall Risk Sign visible to staff  - Offer Toileting every 2 Hours, in advance of need  - Initiate/Maintain bed/chair alarm  - Obtain necessary fall risk management equipment: bed/chair alarm, call bell, gripper socks, walker, bedside commode  - Apply yellow socks and bracelet for high fall risk patients  - Consider moving patient to room near nurses station  Outcome: Progressing  Goal: Maintains/Returns to pre admission functional level  Description: INTERVENTIONS:  - Perform BMAT or MOVE assessment daily.   - Set and communicate daily mobility goal to care team and patient. - Collaborate with rehabilitation services on mobility goals if consulted  - Reposition patient every 2 hours.   - Dangle patient 3 times a day  - Stand patient 3 times a day  - Ambulate patient 3 times a day  - Out of bed to chair 3 times a day   - Out of bed for meals  - Out of bed for toileting  - Record patient progress and toleration of activity level   Outcome: Progressing     Problem: DISCHARGE PLANNING  Goal: Discharge to home or other facility with appropriate resources  Description: INTERVENTIONS:  - Identify barriers to discharge w/patient  - Arrange for needed discharge resources and transportation as appropriate  - Identify discharge learning needs   - Refer to Case Management Department for coordinating discharge planning if the patient needs post-hospital services based on physician/advanced practitioner order or complex needs related to functional status, cognitive ability, or social support system  Outcome: Progressing     Problem: Knowledge Deficit  Goal: Patient/family/caregiver demonstrates understanding of disease process, treatment plan, medications, and discharge instructions  Description: Complete learning assessment and assess knowledge base.   Interventions:  - Provide teaching at level of understanding  - Provide teaching via preferred learning methods  Outcome: Progressing     Problem: Prexisting or High Potential for Compromised Skin Integrity  Goal: Skin integrity is maintained or improved  Description: INTERVENTIONS:  - Identify patients at risk for skin breakdown  - Assess and monitor skin integrity  - Assess and monitor nutrition and hydration status  - Monitor labs   - Assess for incontinence   - Turn and reposition patient  - Assist with mobility/ambulation  - Relieve pressure over bony prominences  - Avoid friction and shearing  - Provide appropriate hygiene as needed including keeping skin clean and dry  - Evaluate need for skin moisturizer/barrier cream  - Collaborate with interdisciplinary team   - Patient/family teaching  - Consider wound care consult   Outcome: Progressing     Problem: Nutrition/Hydration-ADULT  Goal: Nutrient/Hydration intake appropriate for improving, restoring or maintaining nutritional needs  Description: Monitor and assess patient's nutrition/hydration status for malnutrition. Collaborate with interdisciplinary team and initiate plan and interventions as ordered. Monitor patient's weight and dietary intake as ordered or per policy. Utilize nutrition screening tool and intervene as necessary. Determine patient's food preferences and provide high-protein, high-caloric foods as appropriate.      INTERVENTIONS:  - Monitor oral intake, urinary output, labs, and treatment plans  - Assess nutrition and hydration status and recommend course of action  - Evaluate amount of meals eaten  - Do not leave patient alone while eating due to aspiration risk  - Assist patient with eating if necessary   - Allow adequate time for meals  - Recommend/ encourage appropriate diets, oral nutritional supplements, and vitamin/mineral supplements  - Order, calculate, and assess calorie counts as needed  - Assess need for intravenous fluids  - Provide specific nutrition/hydration education as appropriate  - Include patient in decisions related to nutrition  Outcome: Progressing     Problem: RESPIRATORY - ADULT  Goal: Achieves optimal ventilation and oxygenation  Description: INTERVENTIONS:  - Assess for changes in respiratory status  - Assess for changes in mentation and behavior  - Position to facilitate oxygenation and minimize respiratory effort  - Oxygen administered by appropriate delivery if ordered   - Encourage broncho-pulmonary hygiene including cough, deep breathe, Incentive Spirometry, flutter valve  - Assess the need for suctioning and aspirate as needed  - Assess and instruct to report SOB or any respiratory difficulty  - Respiratory Therapy support as indicated  Outcome: Progressing     Problem: GENITOURINARY - ADULT  Goal: Maintains or returns to baseline urinary function  Description: INTERVENTIONS:  - Assess urinary function  - Encourage oral fluids to ensure adequate hydration if ordered  - Administer IV fluids as ordered to ensure adequate hydration  - Administer ordered medications as needed  - Offer frequent toileting  - Follow urinary retention protocol if ordered  Outcome: Progressing  Goal: Urinary catheter remains patent  Description: INTERVENTIONS:  - Assess patency of urinary catheter  - Follow guidelines for intermittent irrigation of non-functioning urinary catheter  Outcome: Progressing

## 2023-08-19 NOTE — PLAN OF CARE
Problem: PAIN - ADULT  Goal: Verbalizes/displays adequate comfort level or baseline comfort level  Description: Interventions:  - Encourage patient to monitor pain and request assistance  - Assess pain using appropriate pain scale  - Administer analgesics based on type and severity of pain and evaluate response  - Implement non-pharmacological measures as appropriate and evaluate response  - Consider cultural and social influences on pain and pain management  - Notify physician/advanced practitioner if interventions unsuccessful or patient reports new pain  Outcome: Progressing     Problem: INFECTION - ADULT  Goal: Absence or prevention of progression during hospitalization  Description: INTERVENTIONS:  - Assess and monitor for signs and symptoms of infection  - Monitor lab/diagnostic results  - Monitor all insertion sites, i.e. indwelling lines, tubes, and drains  - Monitor endotracheal if appropriate and nasal secretions for changes in amount and color  - [place] appropriate cooling/warming therapies per order  - Administer medications as ordered  - Instruct and encourage patient and family to use good hand hygiene technique  - Identify and instruct in appropriate isolation precautions (contact isolation for CRE) for identified infection/condition  Outcome: Progressing     Problem: SAFETY ADULT  Goal: Patient will remain free of falls  Description: INTERVENTIONS:  - Educate patient/family on patient safety including physical limitations  - Instruct patient to call for assistance with activity   - Consult OT/PT to assist with strengthening/mobility   - Keep Call bell within reach  - Keep bed low and locked with side rails adjusted as appropriate  - Keep care items and personal belongings within reach  - Initiate and maintain comfort rounds  - Make Fall Risk Sign visible to staff  - Offer Toileting every 2 Hours, in advance of need  - Initiate/Maintain bed/chair alarm  - Obtain necessary fall risk management equipment: bed/chair alarm, call bell, gripper socks, walker, bedside commode  - Apply yellow socks and bracelet for high fall risk patients  - Consider moving patient to room near nurses station  Outcome: Progressing

## 2023-08-20 NOTE — PLAN OF CARE
Problem: Potential for Falls  Goal: Patient will remain free of falls  Description: INTERVENTIONS:  - Educate patient/family on patient safety including physical limitations  - Instruct patient to call for assistance with activity   - Consult OT/PT to assist with strengthening/mobility   - Keep Call bell within reach  - Keep bed low and locked with side rails adjusted as appropriate  - Keep care items and personal belongings within reach  - Initiate and maintain comfort rounds  - Make Fall Risk Sign visible to staff  - Offer Toileting every 2 Hours, in advance of need  - Initiate/Maintain bed/chair alarm  - Obtain necessary fall risk management equipment: bed/chair alarm, call bell, gripper socks, walker, bedside commode  - Apply yellow socks and bracelet for high fall risk patients  - Consider moving patient to room near nurses station  Outcome: Progressing     Problem: MOBILITY - ADULT  Goal: Maintain or return to baseline ADL function  Description: INTERVENTIONS:  - Educate patient/family on patient safety including physical limitations  - Instruct patient to call for assistance with activity   - Consult OT/PT to assist with strengthening/mobility   - Keep Call bell within reach  - Keep bed low and locked with side rails adjusted as appropriate  - Keep care items and personal belongings within reach  - Initiate and maintain comfort rounds  - Make Fall Risk Sign visible to staff  - Offer Toileting every 2 Hours, in advance of need  - Initiate/Maintain bed/chair alarm  - Obtain necessary fall risk management equipment: bed/chair alarm, call bell, gripper socks, walker, bedside commode  - Apply yellow socks and bracelet for high fall risk patients  - Consider moving patient to room near nurses station  Outcome: Progressing     Problem: PAIN - ADULT  Goal: Verbalizes/displays adequate comfort level or baseline comfort level  Description: Interventions:  - Encourage patient to monitor pain and request assistance  - Assess pain using appropriate pain scale  - Administer analgesics based on type and severity of pain and evaluate response  - Implement non-pharmacological measures as appropriate and evaluate response  - Consider cultural and social influences on pain and pain management  - Notify physician/advanced practitioner if interventions unsuccessful or patient reports new pain  Outcome: Progressing

## 2023-08-20 NOTE — ASSESSMENT & PLAN NOTE
[facility] PRN    Recent Labs     08/18/23  0455 08/19/23  0637 08/20/23  0545   K 4.3 3.9 4.9   MG  --   --  1.6*

## 2023-08-20 NOTE — PLAN OF CARE
Problem: Potential for Falls  Goal: Patient will remain free of falls  Description: INTERVENTIONS:  - Educate patient/family on patient safety including physical limitations  - Instruct patient to call for assistance with activity   - Consult OT/PT to assist with strengthening/mobility   - Keep Call bell within reach  - Keep bed low and locked with side rails adjusted as appropriate  - Keep care items and personal belongings within reach  - Initiate and maintain comfort rounds  - Make Fall Risk Sign visible to staff  - Offer Toileting every 2 Hours, in advance of need  - Initiate/Maintain bed/chair alarm  - Obtain necessary fall risk management equipment: bed/chair alarm, call bell, gripper socks, walker, bedside commode  - Apply yellow socks and bracelet for high fall risk patients  - Consider moving patient to room near nurses station  Outcome: Progressing     Problem: MOBILITY - ADULT  Goal: Maintain or return to baseline ADL function  Description: INTERVENTIONS:  - Educate patient/family on patient safety including physical limitations  - Instruct patient to call for assistance with activity   - Consult OT/PT to assist with strengthening/mobility   - Keep Call bell within reach  - Keep bed low and locked with side rails adjusted as appropriate  - Keep care items and personal belongings within reach  - Initiate and maintain comfort rounds  - Make Fall Risk Sign visible to staff  - Offer Toileting every 2 Hours, in advance of need  - Initiate/Maintain bed/chair alarm  - Obtain necessary fall risk management equipment: bed/chair alarm, call bell, gripper socks, walker, bedside commode  - Apply yellow socks and bracelet for high fall risk patients  - Consider moving patient to room near nurses station  Outcome: Progressing  Goal: Maintains/Returns to pre admission functional level  Description: INTERVENTIONS:  - Perform BMAT or MOVE assessment daily.   - Set and communicate daily mobility goal to care team and patient. - Collaborate with rehabilitation services on mobility goals if consulted  - Reposition patient every 2 hours.   - Dangle patient 3 times a day  - Stand patient 3 times a day  - Ambulate patient 3 times a day  - Out of bed to chair 3 times a day   - Out of bed for meals  - Out of bed for toileting  - Record patient progress and toleration of activity level   Outcome: Progressing     Problem: PAIN - ADULT  Goal: Verbalizes/displays adequate comfort level or baseline comfort level  Description: Interventions:  - Encourage patient to monitor pain and request assistance  - Assess pain using appropriate pain scale  - Administer analgesics based on type and severity of pain and evaluate response  - Implement non-pharmacological measures as appropriate and evaluate response  - Consider cultural and social influences on pain and pain management  - Notify physician/advanced practitioner if interventions unsuccessful or patient reports new pain  Outcome: Progressing     Problem: INFECTION - ADULT  Goal: Absence or prevention of progression during hospitalization  Description: INTERVENTIONS:  - Assess and monitor for signs and symptoms of infection  - Monitor lab/diagnostic results  - Monitor all insertion sites, i.e. indwelling lines, tubes, and drains  - Monitor endotracheal if appropriate and nasal secretions for changes in amount and color  - [place] appropriate cooling/warming therapies per order  - Administer medications as ordered  - Instruct and encourage patient and family to use good hand hygiene technique  - Identify and instruct in appropriate isolation precautions (contact isolation for CRE) for identified infection/condition  Outcome: Progressing     Problem: SAFETY ADULT  Goal: Patient will remain free of falls  Description: INTERVENTIONS:  - Educate patient/family on patient safety including physical limitations  - Instruct patient to call for assistance with activity   - Consult OT/PT to assist with strengthening/mobility   - Keep Call bell within reach  - Keep bed low and locked with side rails adjusted as appropriate  - Keep care items and personal belongings within reach  - Initiate and maintain comfort rounds  - Make Fall Risk Sign visible to staff  - Offer Toileting every 2 Hours, in advance of need  - Initiate/Maintain bed/chair alarm  - Obtain necessary fall risk management equipment: bed/chair alarm, call bell, gripper socks, walker, bedside commode  - Apply yellow socks and bracelet for high fall risk patients  - Consider moving patient to room near nurses station  Outcome: Progressing  Goal: Maintain or return to baseline ADL function  Description: INTERVENTIONS:  - Educate patient/family on patient safety including physical limitations  - Instruct patient to call for assistance with activity   - Consult OT/PT to assist with strengthening/mobility   - Keep Call bell within reach  - Keep bed low and locked with side rails adjusted as appropriate  - Keep care items and personal belongings within reach  - Initiate and maintain comfort rounds  - Make Fall Risk Sign visible to staff  - Offer Toileting every 2 Hours, in advance of need  - Initiate/Maintain bed/chair alarm  - Obtain necessary fall risk management equipment: bed/chair alarm, call bell, gripper socks, walker, bedside commode  - Apply yellow socks and bracelet for high fall risk patients  - Consider moving patient to room near nurses station  Outcome: Progressing  Goal: Maintains/Returns to pre admission functional level  Description: INTERVENTIONS:  - Perform BMAT or MOVE assessment daily.   - Set and communicate daily mobility goal to care team and patient. - Collaborate with rehabilitation services on mobility goals if consulted  - Reposition patient every 2 hours.   - Dangle patient 3 times a day  - Stand patient 3 times a day  - Ambulate patient 3 times a day  - Out of bed to chair 3 times a day   - Out of bed for meals  - Out of bed for toileting  - Record patient progress and toleration of activity level   Outcome: Progressing     Problem: DISCHARGE PLANNING  Goal: Discharge to home or other facility with appropriate resources  Description: INTERVENTIONS:  - Identify barriers to discharge w/patient  - Arrange for needed discharge resources and transportation as appropriate  - Identify discharge learning needs   - Refer to Case Management Department for coordinating discharge planning if the patient needs post-hospital services based on physician/advanced practitioner order or complex needs related to functional status, cognitive ability, or social support system  Outcome: Progressing     Problem: Knowledge Deficit  Goal: Patient/family/caregiver demonstrates understanding of disease process, treatment plan, medications, and discharge instructions  Description: Complete learning assessment and assess knowledge base. Interventions:  - Provide teaching at level of understanding  - Provide teaching via preferred learning methods  Outcome: Progressing     Problem: Nutrition/Hydration-ADULT  Goal: Nutrient/Hydration intake appropriate for improving, restoring or maintaining nutritional needs  Description: Monitor and assess patient's nutrition/hydration status for malnutrition. Collaborate with interdisciplinary team and initiate plan and interventions as ordered. Monitor patient's weight and dietary intake as ordered or per policy. Utilize nutrition screening tool and intervene as necessary. Determine patient's food preferences and provide high-protein, high-caloric foods as appropriate.      INTERVENTIONS:  - Monitor oral intake, urinary output, labs, and treatment plans  - Assess nutrition and hydration status and recommend course of action  - Evaluate amount of meals eaten  - Do not leave patient alone while eating due to aspiration risk  - Assist patient with eating if necessary   - Allow adequate time for meals  - Recommend/ encourage appropriate diets, oral nutritional supplements, and vitamin/mineral supplements  - Order, calculate, and assess calorie counts as needed  - Assess need for intravenous fluids  - Provide specific nutrition/hydration education as appropriate  - Include patient in decisions related to nutrition  Outcome: Progressing     Problem: RESPIRATORY - ADULT  Goal: Achieves optimal ventilation and oxygenation  Description: INTERVENTIONS:  - Assess for changes in respiratory status  - Assess for changes in mentation and behavior  - Position to facilitate oxygenation and minimize respiratory effort  - Oxygen administered by appropriate delivery if ordered   - Encourage broncho-pulmonary hygiene including cough, deep breathe, Incentive Spirometry, flutter valve  - Assess the need for suctioning and aspirate as needed  - Assess and instruct to report SOB or any respiratory difficulty  - Respiratory Therapy support as indicated  Outcome: Progressing

## 2023-08-20 NOTE — PROGRESS NOTES
[facility]  Progress Note  Name: Faith Zarate  MRN: [id number]  Unit/Bed#: E2 -01 I Date of Admission: 8/10/2023   Date of Service: 8/20/2023 I Hospital Day: 10    Assessment/Plan   Hypoglycemia  Assessment & Plan  Unfortunately, patient became acutely hypoglycemic this admission. Anticipate improvement once PEG tube feeds began. We will start D5 half-normal at 30 cc an hour for support, monitor    8/17-update: Spoke with endocrinology and appreciate their recommendations. Recommend holding dextrose and allowing blood glucose to drop below 40. Obtain the following labs: Insulin, C-peptide, cortisol, proinsulin, glucose, growth hormone, Cefol urea screen with urine and plasma. After that, may initiate dextrose drip as needed and endocrinology will follow along. 8/19-blood glucose level stable with tube feeds; continue and monitor    Recent Labs     08/19/23  0025 08/19/23  0633 08/19/23  1201 08/19/23  1749 08/20/23  0128 08/20/23  0620   POCGLU 115 136 108 145* 114 120         Electrolyte imbalance  Assessment & Plan  Replete PRN    Recent Labs     08/18/23  0455 08/19/23  0637 08/20/23  0545   K 4.3 3.9 4.9   MG  --   --  1.6*         Sepsis (HCC)  Assessment & Plan  pt initially with lactate 2.7 now 2.0  -bc x 2 sent in ER  -levoquin started. Pt with multiple allergies  -recent admission to hospital and arc  -infiltrates noted on ct chest  -pt not given full ivf 2/2 HF. Pt was given 250 bolus and lasix in ER  -HD stable at this time      Closed T12 fracture [facility])  Assessment & Plan  History of low impact fall that resulted in multiple T-spine fractures  · Receiving pain control with Morphine PRN  · Follows with neurosurgery in the outpatient setting    Dysphagia  Assessment & Plan  Currently on pureed and nectar thick, but still with aspiration.   · Appreciate speech recommendations  · Patient and daughter agreeable to PEG tube placement, appreciate GI recommendations. Likely to place tomorrow. · Have consulted gastroenterology, PEG tube placed and functioning well  · Consult to nutrition, appreciate recommendations-have increased PEG tube feeds to 40 cc an hour today, monitor    Altered mental status  Assessment & Plan  Altered mental status on presentation. Etiology possibly due to hypoglycemia, fatigue, underlying disease. · Continue supportive care  · Today, appeared alert and oriented X3, improving    History of stroke  Assessment & Plan    · Eliquis was held pending PEG tube placement; restart once PEG tube inserted    Rheumatoid arthritis of multiple sites with negative rheumatoid factor ([address])  Assessment & Plan  History of seronegative multiple joint arthropathy. · Continue Plaquenil    * Acute respiratory failure with hypoxia (HCC)  Assessment & Plan  Acute respiratory failure with hypoxia secondary to aspiration pneumonitis. · Continue daily weaning trials  · Albuterol PRN  · Patient reports she is on 2 L at home/at night; currently on 2 L nasal cannula, improving  · Patient's daughter reports patient likely will need oxygen around-the-clock as she frequently desaturates during the day without oxygen support. · Home O2 eval prior to discharge as patient likely will require oxygen around-the-clock           VTE Pharmacologic Prophylaxis:   Pharmacologic: Apixaban (Eliquis)  Mechanical VTE Prophylaxis in Place: Yes    Patient Centered Rounds: I have performed bedside rounds with nursing staff today. Discussions with Specialists or Other Care Team Provider:     Education and Discussions with Family / Patient: Discussed treatment plan with family and patient who agree with current plan; encouraged to ask questions and participate. Time Spent for Care: 45 minutes. More than 50% of total time spent on counseling and coordination of care as described above.     Current Length of Stay: 10 day(s)    Current Patient Status: Inpatient   Certification Statement: The patient will continue to require additional inpatient hospital stay due to Uptitrating of tube feeds    Discharge Plan: To be determined, likely tomorrow    Code Status: Level 1 - Full Code      Subjective:   Patient seen and examined bedside, no acute distress or discomfort noted. Some concern for patient's respiratory status systems on 4 L overnight, but found out patient had accidentally removed nasal cannula. Extubated, patient was down titrated back to baseline of 2 L. Magnesium repleted and tube feeds running along well. Nausea has resolved and patient appears stable on exam.    Objective:     Vitals:   Temp (24hrs), Av.1 °F (36.7 °C), Min:97 °F (36.1 °C), Max:98.8 °F (37.1 °C)    Temp:  [97 °F (36.1 °C)-98.8 °F (37.1 °C)] 98.6 °F (37 °C)  HR:  [102-118] 118  Resp:  [19-20] 19  BP: (101-111)/(59-72) 110/66  SpO2:  [99 %-100 %] 99 %  Body mass index is 20.89 kg/m². Input and Output Summary (last 24 hours): Intake/Output Summary (Last 24 hours) at 2023 1529  Last data filed at 2023 0847  Gross per 24 hour   Intake 612 ml   Output 5 ml   Net 607 ml       Physical Exam:     Physical Exam  Vitals and nursing note reviewed. Constitutional:       General: She is not in acute distress. Appearance: She is well-developed. HENT:      Head: Normocephalic and atraumatic. Eyes:      Conjunctiva/sclera: Conjunctivae normal.   Cardiovascular:      Rate and Rhythm: Normal rate and regular rhythm. Heart sounds: No murmur heard. Pulmonary:      Effort: Pulmonary effort is normal. No respiratory distress. Breath sounds: Normal breath sounds. Abdominal:      Palpations: Abdomen is soft. Tenderness: There is no abdominal tenderness. Comments: PEG tube   Musculoskeletal:         General: No swelling. Cervical back: Neck supple. Comments: Kyphotic   Skin:     General: Skin is warm and dry. Neurological:      Mental Status: She is alert. Comments: Dysphonia; left-sided weakness   Psychiatric:         Mood and Affect: Mood normal.           Additional Data:     Labs:    Results from last 7 days   Lab Units 08/20/23  0545   WBC Thousand/uL 8.41   HEMOGLOBIN g/dL 8.1*   HEMATOCRIT % 26.9*   PLATELETS Thousands/uL 163   NEUTROS PCT % 76*   LYMPHS PCT % 13*   MONOS PCT % 10   EOS PCT % 0     Results from last 7 days   Lab Units 08/20/23  0545   SODIUM mmol/L 137   POTASSIUM mmol/L 4.9   CHLORIDE mmol/L 105   CO2 mmol/L 28   BUN mg/dL 19   CREATININE mg/dL 0.82   ANION GAP mmol/L 4   CALCIUM mg/dL 8.2*   ALBUMIN g/dL 2.4*   TOTAL BILIRUBIN mg/dL 0.56   ALK PHOS U/L 71   ALT U/L 14   AST U/L 46*   GLUCOSE RANDOM mg/dL 117     Results from last 7 days   Lab Units 08/17/23  0446   INR  1.28*     Results from last 7 days   Lab Units 08/20/23  1129 08/20/23  0620 08/20/23  0128 08/19/23  1749 08/19/23  1201 08/19/23  0633 08/19/23  0025 08/18/23  1902 08/18/23  1504 08/18/23  1229 08/18/23  1139 08/18/23  1110   POC GLUCOSE mg/dl 109 120 114 145* 108 136 115 117 133 254* <20* 21*         Results from last 7 days   Lab Units 08/15/23  0446   PROCALCITONIN ng/ml 0.08           * I Have Reviewed All Lab Data Listed Above. * Additional Pertinent Lab Tests Reviewed:  All Labs Within Last 24 Hours Reviewed    Imaging:    Imaging Reports Reviewed Today Include:   Imaging Personally Reviewed by Myself Includes:      Recent Cultures (last 7 days):           Last 24 Hours Medication List:   Current Facility-Administered Medications   Medication Dose Route Frequency Provider Last Rate   • albuterol  2 puff Inhalation Q6H PRN Lianet Marroquin PA-C     • aluminum-magnesium hydroxide-simethicone  30 mL Oral Q4H PRN Devin Cook PA-C     • apixaban  5 mg Per PEG Tube BID Mirza Garrett MD     • buPROPion  50 mg Oral BID Geeta Giordano PA-C     • cinacalcet  30 mg Oral Daily With Breakfast Lianet Marroquin PA-C     • DULoxetine  60 mg Oral BID Geeta Giordano, OWEN     • hydroxychloroquine  200 mg Oral BID With Meals Dana Saunders MD     • metoprolol succinate  25 mg Oral Daily Lianet Marroquin PA-C     • morphine injection  2 mg Intravenous Q4H PRN Darren Campbell MD     • morphine injection  4 mg Intravenous Q4H PRN Darren Campbell MD     • ondansetron  4 mg Intravenous Q6H PRN Lazara López PA-C     • pantoprazole  40 mg Intravenous Q24H [address] Darren Campbell MD          Today, Patient Was Seen By: Cherie Gonzalez MD    ** Please Note: Dictation voice to text software may have been used in the creation of this document.  **

## 2023-08-20 NOTE — ASSESSMENT & PLAN NOTE
Unfortunately, patient became acutely hypoglycemic this admission. Anticipate improvement once PEG tube feeds began. We will start D5 half-normal at 30 cc an hour for support, monitor    8/17-update: Spoke with endocrinology and appreciate their recommendations. Recommend holding dextrose and allowing blood glucose to drop below 40. Obtain the following labs: Insulin, C-peptide, cortisol, proinsulin, glucose, growth hormone, Cefol urea screen with urine and plasma. After that, may initiate dextrose drip as needed and endocrinology will follow along.     8/19-blood glucose level stable with tube feeds; continue and monitor    Recent Labs     08/19/23  0025 08/19/23  0633 08/19/23  1201 08/19/23  1749 08/20/23  0128 08/20/23  0620   POCGLU 115 136 108 145* 114 120

## 2023-08-20 NOTE — ASSESSMENT & PLAN NOTE
pt initially with lactate 2.7 now 2.0  -bc x 2 sent in ER  -levoquin started. Pt with multiple allergies  -recent admission to hospital and arc  -infiltrates noted on ct chest  -pt not given full ivf 2/2 HF.  Pt was given 250 bolus and lasix in ER  -HD stable at this time

## 2023-08-21 NOTE — PLAN OF CARE
Problem: Potential for Falls  Goal: Patient will remain free of falls  Description: INTERVENTIONS:  - Educate patient/family on patient safety including physical limitations  - Instruct patient to call for assistance with activity   - Consult OT/PT to assist with strengthening/mobility   - Keep Call bell within reach  - Keep bed low and locked with side rails adjusted as appropriate  - Keep care items and personal belongings within reach  - Initiate and maintain comfort rounds  - Make Fall Risk Sign visible to staff  - Offer Toileting every 2 Hours, in advance of need  - Initiate/Maintain bed/chair alarm  - Obtain necessary fall risk management equipment: bed/chair alarm, call bell, gripper socks, walker, bedside commode  - Apply yellow socks and bracelet for high fall risk patients  - Consider moving patient to room near nurses station  Outcome: Progressing     Problem: MOBILITY - ADULT  Goal: Maintain or return to baseline ADL function  Description: INTERVENTIONS:  - Educate patient/family on patient safety including physical limitations  - Instruct patient to call for assistance with activity   - Consult OT/PT to assist with strengthening/mobility   - Keep Call bell within reach  - Keep bed low and locked with side rails adjusted as appropriate  - Keep care items and personal belongings within reach  - Initiate and maintain comfort rounds  - Make Fall Risk Sign visible to staff  - Offer Toileting every 2 Hours, in advance of need  - Initiate/Maintain bed/chair alarm  - Obtain necessary fall risk management equipment: bed/chair alarm, call bell, gripper socks, walker, bedside commode  - Apply yellow socks and bracelet for high fall risk patients  - Consider moving patient to room near nurses station  Outcome: Progressing  Goal: Maintains/Returns to pre admission functional level  Description: INTERVENTIONS:  - Perform BMAT or MOVE assessment daily.   - Set and communicate daily mobility goal to care team and patient. - Collaborate with rehabilitation services on mobility goals if consulted  - Reposition patient every 2 hours.   - Dangle patient 3 times a day  - Stand patient 3 times a day  - Ambulate patient 3 times a day  - Out of bed to chair 3 times a day   - Out of bed for meals  - Out of bed for toileting  - Record patient progress and toleration of activity level   Outcome: Progressing     Problem: PAIN - ADULT  Goal: Verbalizes/displays adequate comfort level or baseline comfort level  Description: Interventions:  - Encourage patient to monitor pain and request assistance  - Assess pain using appropriate pain scale  - Administer analgesics based on type and severity of pain and evaluate response  - Implement non-pharmacological measures as appropriate and evaluate response  - Consider cultural and social influences on pain and pain management  - Notify physician/advanced practitioner if interventions unsuccessful or patient reports new pain  Outcome: Progressing     Problem: INFECTION - ADULT  Goal: Absence or prevention of progression during hospitalization  Description: INTERVENTIONS:  - Assess and monitor for signs and symptoms of infection  - Monitor lab/diagnostic results  - Monitor all insertion sites, i.e. indwelling lines, tubes, and drains  - Monitor endotracheal if appropriate and nasal secretions for changes in amount and color  - Pablo appropriate cooling/warming therapies per order  - Administer medications as ordered  - Instruct and encourage patient and family to use good hand hygiene technique  - Identify and instruct in appropriate isolation precautions (contact isolation for CRE) for identified infection/condition  Outcome: Progressing     Problem: SAFETY ADULT  Goal: Patient will remain free of falls  Description: INTERVENTIONS:  - Educate patient/family on patient safety including physical limitations  - Instruct patient to call for assistance with activity   - Consult OT/PT to assist with strengthening/mobility   - Keep Call bell within reach  - Keep bed low and locked with side rails adjusted as appropriate  - Keep care items and personal belongings within reach  - Initiate and maintain comfort rounds  - Make Fall Risk Sign visible to staff  - Offer Toileting every 2 Hours, in advance of need  - Initiate/Maintain bed/chair alarm  - Obtain necessary fall risk management equipment: bed/chair alarm, call bell, gripper socks, walker, bedside commode  - Apply yellow socks and bracelet for high fall risk patients  - Consider moving patient to room near nurses station  Outcome: Progressing  Goal: Maintain or return to baseline ADL function  Description: INTERVENTIONS:  - Educate patient/family on patient safety including physical limitations  - Instruct patient to call for assistance with activity   - Consult OT/PT to assist with strengthening/mobility   - Keep Call bell within reach  - Keep bed low and locked with side rails adjusted as appropriate  - Keep care items and personal belongings within reach  - Initiate and maintain comfort rounds  - Make Fall Risk Sign visible to staff  - Offer Toileting every 2 Hours, in advance of need  - Initiate/Maintain bed/chair alarm  - Obtain necessary fall risk management equipment: bed/chair alarm, call bell, gripper socks, walker, bedside commode  - Apply yellow socks and bracelet for high fall risk patients  - Consider moving patient to room near nurses station  Outcome: Progressing  Goal: Maintains/Returns to pre admission functional level  Description: INTERVENTIONS:  - Perform BMAT or MOVE assessment daily.   - Set and communicate daily mobility goal to care team and patient. - Collaborate with rehabilitation services on mobility goals if consulted  - Reposition patient every 2 hours.   - Dangle patient 3 times a day  - Stand patient 3 times a day  - Ambulate patient 3 times a day  - Out of bed to chair 3 times a day   - Out of bed for meals  - Out of bed for toileting  - Record patient progress and toleration of activity level   Outcome: Progressing     Problem: DISCHARGE PLANNING  Goal: Discharge to home or other facility with appropriate resources  Description: INTERVENTIONS:  - Identify barriers to discharge w/patient  - Arrange for needed discharge resources and transportation as appropriate  - Identify discharge learning needs   - Refer to Case Management Department for coordinating discharge planning if the patient needs post-hospital services based on physician/advanced practitioner order or complex needs related to functional status, cognitive ability, or social support system  Outcome: Progressing     Problem: Knowledge Deficit  Goal: Patient/family/caregiver demonstrates understanding of disease process, treatment plan, medications, and discharge instructions  Description: Complete learning assessment and assess knowledge base.   Interventions:  - Provide teaching at level of understanding  - Provide teaching via preferred learning methods  Outcome: Progressing     Problem: Prexisting or High Potential for Compromised Skin Integrity  Goal: Skin integrity is maintained or improved  Description: INTERVENTIONS:  - Identify patients at risk for skin breakdown  - Assess and monitor skin integrity  - Assess and monitor nutrition and hydration status  - Monitor labs   - Assess for incontinence   - Turn and reposition patient  - Assist with mobility/ambulation  - Relieve pressure over bony prominences  - Avoid friction and shearing  - Provide appropriate hygiene as needed including keeping skin clean and dry  - Evaluate need for skin moisturizer/barrier cream  - Collaborate with interdisciplinary team   - Patient/family teaching  - Consider wound care consult   Outcome: Progressing     Problem: Nutrition/Hydration-ADULT  Goal: Nutrient/Hydration intake appropriate for improving, restoring or maintaining nutritional needs  Description: Monitor and assess patient's nutrition/hydration status for malnutrition. Collaborate with interdisciplinary team and initiate plan and interventions as ordered. Monitor patient's weight and dietary intake as ordered or per policy. Utilize nutrition screening tool and intervene as necessary. Determine patient's food preferences and provide high-protein, high-caloric foods as appropriate.      INTERVENTIONS:  - Monitor oral intake, urinary output, labs, and treatment plans  - Assess nutrition and hydration status and recommend course of action  - Evaluate amount of meals eaten  - Do not leave patient alone while eating due to aspiration risk  - Assist patient with eating if necessary   - Allow adequate time for meals  - Recommend/ encourage appropriate diets, oral nutritional supplements, and vitamin/mineral supplements  - Order, calculate, and assess calorie counts as needed  - Assess need for intravenous fluids  - Provide specific nutrition/hydration education as appropriate  - Include patient in decisions related to nutrition  Outcome: Progressing     Problem: RESPIRATORY - ADULT  Goal: Achieves optimal ventilation and oxygenation  Description: INTERVENTIONS:  - Assess for changes in respiratory status  - Assess for changes in mentation and behavior  - Position to facilitate oxygenation and minimize respiratory effort  - Oxygen administered by appropriate delivery if ordered   - Encourage broncho-pulmonary hygiene including cough, deep breathe, Incentive Spirometry, flutter valve  - Assess the need for suctioning and aspirate as needed  - Assess and instruct to report SOB or any respiratory difficulty  - Respiratory Therapy support as indicated  Outcome: Progressing     Problem: GENITOURINARY - ADULT  Goal: Maintains or returns to baseline urinary function  Description: INTERVENTIONS:  - Assess urinary function  - Encourage oral fluids to ensure adequate hydration if ordered  - Administer IV fluids as ordered to ensure adequate hydration  - Administer ordered medications as needed  - Offer frequent toileting  - Follow urinary retention protocol if ordered  Outcome: Progressing  Goal: Urinary catheter remains patent  Description: INTERVENTIONS:  - Assess patency of urinary catheter  - Follow guidelines for intermittent irrigation of non-functioning urinary catheter  Outcome: Progressing

## 2023-08-21 NOTE — ASSESSMENT & PLAN NOTE
· Secondary to poor intake.   Resolved with initiation of tube feeds    Results from last 7 days   Lab Units 08/21/23  1050 08/21/23  0658 08/21/23  0609 08/21/23  0031   POC GLUCOSE mg/dl 105 117 106 131

## 2023-08-21 NOTE — PHYSICAL THERAPY NOTE
PHYSICAL THERAPY NOTE          Patient Name: Sen Hyde   HLUTT'H Date: 8/21/2023 08/21/23 1785   Note Type   Note Type Treatment   Pain Assessment   Pain Assessment Tool 0-10   Pain Score 10 - Worst Possible Pain   Pain Location/Orientation Location: Abdomen   Hospital Pain Intervention(s) Repositioned; Ambulation/increased activity; Emotional support   Restrictions/Precautions   Braces or Orthoses LSO  (brace not on upon arrival.  pt  out of bed in chair.)   Other Precautions Chair Alarm; Bed Alarm;Multiple lines;O2;Fall Risk;Pain;Spinal precautions;Aspiration  (Peg tube,  2l o2 nc,)   General   Family/Caregiver Present No   Cognition   Overall Cognitive Status Impaired   Arousal/Participation Alert; Responsive   Attention Attends with cues to redirect   Orientation Level Oriented to person;Oriented to place;Oriented to time;Disoriented to situation   Memory Decreased recall of precautions;Decreased recall of recent events   Following Commands Follows one step commands with increased time or repetition   Subjective   Subjective pt  out of bed in chair upon arrival. pt reporting 10/ 10 abdominal pain. pt agreeable to PT. Bed Mobility   Sit to Supine 3  Moderate assistance   Additional items Assist x 1; Increased time required;Verbal cues;LE management   Transfers   Sit to Stand 4  Minimal assistance  (mod x1 first trial, min assist x2 2nd trial)   Additional items Assist x 2;Armrests;Trapeze bar;Verbal cues   Stand to Sit 4  Minimal assistance   Additional items Assist x 2; Increased time required;Verbal cues   Stand pivot 4  Minimal assistance   Additional items Assist x 2; Increased time required;Verbal cues   Additional Comments cues for technique and safety   Ambulation/Elevation   Gait pattern Forward Flexion; Improper Weight shift; Poor UE support;Decreased foot clearance; Short stride; Step to   Gait Assistance 3  Moderate assist   Additional items Assist x 2;Verbal cues   Assistive Device Rolling walker   Distance 2' x1   Ambulation/Elevation Additional Comments first attempt pt  unable to ambulate reported dizziness and sat abruptly, unsteady, decreased activity tolerance, poor endurance,   Balance   Static Sitting Fair +   Dynamic Sitting Fair   Static Standing Poor +   Dynamic Standing Poor   Ambulatory Poor   Endurance Deficit   Endurance Deficit Description pain, fatibue, dizziness, weakness   Activity Tolerance   Activity Tolerance Patient limited by fatigue;Patient limited by pain   Medical Staff Made Aware Jose aguirre   Nurse Made Aware Klaudia EGAN   Exercises   Heelslides Supine;10 reps;AROM; Bilateral   Hip Abduction Supine;10 reps;AAROM; Bilateral   Hip Adduction Supine;10 reps;AAROM; Bilateral   Knee AROM Short Arc Quad Supine;10 reps;AROM; Bilateral   Ankle Pumps Supine;10 reps;AROM; Bilateral   Assessment   Prognosis Fair   Problem List Decreased strength;Decreased endurance; Impaired balance;Decreased mobility; Decreased cognition; Impaired judgement;Decreased safety awareness;Pain;Orthopedic restrictions   Assessment Pt seen for PT treatment session this date with interventions consisting of bed mobility, transfer training, gait training and HEP, and education provided as needed for safety and direction to improve functional mobility, safety awareness, and activity tolerance. Pt agreeable to PT treatment session upon arrival, pt found seated out of bed in chair. At end of session, pt left supine in bed with all needs in reach. In comparison to previous session, pt with no improvement activity tolerance, endurance, standing balance, ambulation distances, ambulatory balance, AM- pac score and functional mobility. Pt is requiring more assistance this session for transfers, ambulation and bed mobility. Pt  Unable to ambulate first attempt due to fatigue, weakness and dizziness reported. Pt requesting to sit.   Pt rested then ambulated 2' x1 with mod assist x2 with verbal cues for posture, turning, backing up to bed, body positioning and hand placement. Pt performs sit to supine with mod assist x1. Pt presents with poor activity tolerance, functional endurance, mobility, strength, balance, safety, cognition requiring increased assistance and increased time to perform and complete all mobility. Pt  Perform supine a-aarom to b/l le's  With verbal and tactile cues for correct performance and exercise techniques. Continue to recommend home with HHPT and 24 hour support/ assist pending progress and achievement of PT goals vs STR at time of d/c in order to maximize pt's functional independence and safety w/ mobility. Pt continues to be functioning below baseline level. PT will continue to see pt while here in order to address the deficits listed above and provide interventions consistent w/ POC in effort to achieve STGs. The patient's [facility] Basic Mobility Inpatient Short Form Raw Score is 8. A raw score less than 16 suggests the patient may benefit from discharge to post-acute rehabilitation services. Please also refer to the recommendation of the Physical Therapist for safe discharge planning. Goals   Patient Goals to go home. STG Expiration Date 08/30/23   PT Treatment Day 1   Plan   Treatment/Interventions Functional transfer training;LE strengthening/ROM; Therapeutic exercise; Endurance training;Patient/family training;Equipment eval/education; Bed mobility;Gait training;Spoke to nursing   Progress Slow progress, decreased activity tolerance   PT Frequency 3-5x/wk   Recommendation   PT Discharge Recommendation Home with home health rehabilitation  (with 24/7 hour support/ assist pending progress and achievement of PT goals vs STR)   [facility] Basic Mobility Inpatient   Turning in Flat Bed Without Bedrails 2   Lying on Back to Sitting on Edge of Flat Bed Without Bedrails 2   Moving Bed to Chair 1   Standing Up From Chair Using Arms 1 Walk in Room 1   Climb 3-5 Stairs With Railing 1   Basic Mobility Inpatient Raw Score 8   Turning Head Towards Sound 4   Follow Simple Instructions 3   Low Function Basic Mobility Raw Score  15   Low Function Basic Mobility Standardized Score  23.9   Highest Level Of Mobility   -HL Goal 3: Sit at edge of bed   JH-HLM Achieved 4: Move to chair/commode   Education   Education Provided Mobility training;Home exercise program;Assistive device   Patient Reinforcement needed   End of Consult   Patient Position at End of Consult Supine;Bed/Chair alarm activated; All needs within reach   End of Consult Comments pt  repositioned in bed with head of bed elevated, twp pillows behind upperback shoulders and neck and towel roll for neck.    Kary Landa, PTA

## 2023-08-21 NOTE — ASSESSMENT & PLAN NOTE
· Continue wellbutrin and duloxetine.     · Patient chronically on gabapentin and lamictal; will restart today

## 2023-08-21 NOTE — ASSESSMENT & PLAN NOTE
· Likely secondary to hyperparathyroidism. Follows with endocrinology as an outpatient. · Currently on sensipar however medication cannot be crushed.   We will have endocrinology evaluation recommendations

## 2023-08-21 NOTE — CASE MANAGEMENT
Case Management Discharge Planning Note    Patient name Ambika Pandey  Location [facility] 2 /E2 MS 36-* MRN [id number]  : 1955 Date 2023       Current Admission Date: 8/10/2023  Current Admission Diagnosis:Acute respiratory failure with hypoxia [facility])   Patient Active Problem List    Diagnosis Date Noted   • Electrolyte imbalance 2023   • Hypoglycemia 2023   • Closed T12 fracture ([address]) 08/10/2023   • Compression fracture of body of thoracic vertebra (HCC) 08/10/2023   • Sepsis ([address]) 08/10/2023   • Acute respiratory failure with hypoxia (HCC) 08/10/2023   • Chronic combined systolic and diastolic heart failure ([address]) 08/10/2023   • Fall 2023   • Cervical dystonia 2023   • Sundowning 2023   • Night terrors 2023   • Leg hematoma, left, initial encounter 2023   • Cerebral aneurysm, nonruptured 05/15/2023   • Nocturnal hypoxia 2023   • Insomnia 2023   • Sinus tachycardia 2023   • Altered mental status 2023   • Gout of foot 2023   • Dysphagia 2023   • Wound of left ankle 2023   • Urinary tract infection without hematuria 2023   • Obstructive sleep apnea 2023   • History of stroke 2023   • History of bacteremia 2023   • Acute CVA (cerebrovascular accident) ([address]) 2023   • Hypercalcemia 2023   • Discoloration of skin of foot 2023   • HFrEF (heart failure with reduced ejection fraction) ([address]) 2023   • Stroke ([address]) 2023   • HTN (hypertension) 2023   • Cardiomyopathy ([address]) 2023   • Vitamin D insufficiency 2023   • LAILA (acute kidney injury) ([address]) 2022   • Polypharmacy 2022   • 'light-for-dates' infant with signs of fetal malnutrition 2022   • Bipolar depression ([address]) 2022   • Rheumatoid arthritis of multiple sites with negative rheumatoid factor ([address]) 2022   • Closed fracture of multiple ribs of right side 2022   • Traumatic pneumothorax 03/02/2022   • Closed fracture of transverse process of lumbar vertebra ([address]) 03/02/2022   • Liver contusion 03/02/2022   • Acute pain due to trauma 03/02/2022   • Acute pain of right shoulder 03/02/2022   • Severe protein-calorie malnutrition ([address]) 03/02/2022   • Undifferentiated connective tissue disease ([address]) 12/29/2021   • Primary generalized (osteo)arthritis 12/29/2021   • Peripheral neuropathy 12/29/2021   • Osteoporosis 12/29/2021   • Hx of compression fracture of spine 12/29/2021   • Lumbar spondylosis 12/29/2021   • Cervical spondylosis 12/29/2021   • Anemia 06/26/2021   • Chronic pain syndrome    • Ambulatory dysfunction    • Acute metabolic encephalopathy    • Seronegative arthropathy of multiple sites (HCC)    • Gastroesophageal reflux disease without esophagitis    • Mixed stress and urge urinary incontinence    • CHF (congestive heart failure) ([address])       LOS (days): 11  Geometric Mean LOS (GMLOS) (days): 5.00  Days to GMLOS:-5.7     OBJECTIVE:  Risk of Unplanned Readmission Score: 53.88         Current admission status: Inpatient   Preferred Pharmacy:   [facility]  [address] [address]  Phone: [phone] Fax: [phone], [address] [facility] [address] [facility] [address] [address]  Phone: [phone] Fax: [phone]    Primary Care Provider: Maggi Justin DO    Primary Insurance: MEDICARE  Secondary Insurance: AARP    DISCHARGE DETAILS:    Discharge planning discussed with[de-identified] Patient  Freedom of Choice: Yes  Comments - Freedom of Choice: Agreeable to home health. [facility] accepting.  Pt agreeable     Were Treatment Team discharge recommendations reviewed with patient/caregiver?: Yes  Did patient/caregiver verbalize understanding of patient care needs?: Yes  Were patient/caregiver advised of the risks associated with not following Treatment Team discharge recommendations?: Yes         Requested [facility]         Is the patient interested in [facility] at discharge?: Yes  [facility] requested[de-identified] Nursing, Occupational Therapy, Physical [address] [facility] Name[de-identified] Britt Provider[de-identified] PCP  Home Health Services Needed[de-identified] Evaluate Functional Status and Safety, Gait/ADL Training, Strengthening/Theraputic Exercises to Improve Function, Other (comment) (tube feed)  Homebound Criteria Met[de-identified] Requires the Assistance of Another Person for Safe Ambulation or to Leave the Home, Uses an Assist Device (i.e. cane, walker, etc)  Supporting Clincal Findings[de-identified] Fatigues Easliy in [employer], Limited Endurance    DME Referral Provided  Referral made for DME?: Yes  DME referral completed for the following items[de-identified] Other (Enteral Feed)  DME Supplier Name[de-identified] [employer]    Other Referral/Resources/Interventions Provided:  Interventions: HHC, DME         Treatment Team Recommendation: Home with [facility]  Discharge Destination Plan[de-identified] Home with [facility]

## 2023-08-21 NOTE — ASSESSMENT & PLAN NOTE
· History of stroke requiring embolectomy earlier this year with residual left-sided weakness.     · Continue Eliquis

## 2023-08-21 NOTE — PROGRESS NOTES
[facility]  Progress Note  Name: Dora Loza  MRN: [id number]  Unit/Bed#: E2 -01 I Date of Admission: 8/10/2023   Date of Service: 8/21/2023 I Hospital Day: 11    Assessment/Plan   * Acute respiratory failure with hypoxia [facility])  Assessment & Plan  History of stroke with dysphagia, RA, bipolar depression, and systolic CHF who presented to the hospital found to have aspiration pneumonitis  · Admitted to ICU. Completed course of antibiotics. · Feeding tube placed and started on tube feeds. Hypoglycemia  Assessment & Plan  · Secondary to poor intake. Resolved with initiation of tube feeds    Results from last 7 days   Lab Units 08/21/23  1050 08/21/23  0658 08/21/23  0609 08/21/23  0031   POC GLUCOSE mg/dl 105 117 106 131       Dysphagia  Assessment & Plan  · PEG tube placed this admission. Appreciate nutrition recommendations. · Discussed continuous versus bolus feeds. Family wishes to continue continuous feeds due to history of GERD and reflux    History of stroke  Assessment & Plan  · History of stroke requiring embolectomy earlier this year with residual left-sided weakness. · Continue Eliquis    Hypercalcemia  Assessment & Plan  · Likely secondary to hyperparathyroidism. Follows with endocrinology as an outpatient. · Currently on sensipar however medication cannot be crushed. We will have endocrinology evaluation recommendations    Rheumatoid arthritis of multiple sites with negative rheumatoid factor (HCC)  Assessment & Plan  · Continue hydroxychloroquine    Bipolar depression ([address])  Assessment & Plan  · Continue wellbutrin and duloxetine. · Patient chronically on gabapentin and lamictal; will restart today     VTE Pharmacologic Prophylaxis:   Moderate Risk (Score 3-4) - Pharmacological DVT Prophylaxis Ordered: apixaban (Eliquis). Patient Centered Rounds: I have performed bedside rounds with nursing staff today.   Discussions with Specialists or Other Care Team Provider: Case management    Education and Discussions with Family / Patient: Updated  (daughter) via phone. Time Spent for Care: This time was spent on one or more of the following: performing physical exam; counseling and coordination of care; obtaining or reviewing history; documenting in the medical record; reviewing/ordering tests, medications or procedures; communicating with other healthcare professionals and discussing with patient's family/caregivers. Current Length of Stay: 11 day(s)  Current Patient Status: Inpatient   Certification Statement: The patient will continue to require additional inpatient hospital stay due to Starting medications and monitoring on tube feeds  Discharge Plan: Anticipate discharge in 48-72 hrs to home with home services. Code Status: Level 1 - Full Code      Subjective:   Patient seen and examined. No new complaints. Asking about discharge timing. Objective:   Vitals: Blood pressure 106/66, pulse (!) 110, temperature 97.9 °F (36.6 °C), temperature source Temporal, resp. rate 18, height 5' 4" (1.626 m), weight 55.2 kg (121 lb 11.1 oz), SpO2 98 %. Intake/Output Summary (Last 24 hours) at 8/21/2023 0947  Last data filed at 8/20/2023 1737  Gross per 24 hour   Intake 658 ml   Output --   Net 658 ml       Physical Exam  Vitals reviewed. Constitutional:       General: She is not in acute distress. Appearance: Normal appearance. HENT:      Head: Atraumatic. Cardiovascular:      Rate and Rhythm: Regular rhythm. Pulmonary:      Breath sounds: Decreased breath sounds present. No wheezing. Abdominal:      General: Bowel sounds are normal.      Palpations: Abdomen is soft. Tenderness: There is no guarding or rebound. Comments: PEG in place   Musculoskeletal:         General: No swelling. Skin:     General: Skin is warm. Neurological:      Mental Status: She is alert.    Psychiatric:         Mood and Affect: Mood normal.       Additional Data:   Labs:  Results from last 7 days   Lab Units 08/21/23 0518 08/20/23  0545 08/19/23  0637 08/18/23 0455 08/17/23 0446 08/16/23 0418   WBC Thousand/uL 9.72 8.41 11.29*   < > 7.66 7.66   HEMOGLOBIN g/dL 7.6* 8.1* 8.6*   < > 9.2* 9.3*   PLATELETS Thousands/uL 165 163 211   < > 224 230   MCV fL 103* 104* 105*   < > 106* 104*   INR   --   --   --   --  1.28* 1.48*    < > = values in this interval not displayed. Results from last 7 days   Lab Units 08/21/23 0518 08/20/23 0545 08/19/23 0637 08/16/23 1835 08/16/23 0418   SODIUM mmol/L 138 137 137   < > 141   POTASSIUM mmol/L 4.7 4.9 3.9   < > 4.1   CHLORIDE mmol/L 105 105 104   < > 104   CO2 mmol/L 30 28 25   < > 29   ANION GAP mmol/L 3 4 8   < > 8   BUN mg/dL 21 19 17   < > 12   CREATININE mg/dL 0.67 0.82 0.89   < > 0.68   CALCIUM mg/dL 8.1* 8.2* 8.3*   < > 8.0*   ALBUMIN g/dL 2.3* 2.4*  --   --  2.6*   TOTAL BILIRUBIN mg/dL 0.49 0.56  --   --  0.57   ALK PHOS U/L 72 71  --   --  59   ALT U/L 16 14  --   --  14   AST U/L 34 46*  --   --  17   EGFR ml/min/1.73sq m 89 73 66   < > 89   GLUCOSE RANDOM mg/dL 103 117 124   < > 91    < > = values in this interval not displayed. Results from last 7 days   Lab Units 08/21/23 0518 08/20/23 0545 08/16/23  0418 08/15/23  0446   MAGNESIUM mg/dL 1.7* 1.6* 2.0 1.8*   PHOSPHORUS mg/dL 2.3 2.6 3.9  --                   Results from last 7 days   Lab Units 08/15/23  0446   PROCALCITONIN ng/ml 0.08     Results from last 7 days   Lab Units 08/21/23  0658 08/21/23  0609 08/21/23  0031 08/20/23  1724 08/20/23  1129 08/20/23  0620 08/20/23  0128 08/19/23  1749 08/19/23  1201 08/19/23  0633 08/19/23  0025 08/18/23  1902   POC GLUCOSE mg/dl 117 106 131 111 109 120 114 145* 108 136 115 117             * I Have Reviewed All Lab Data Listed Above.     Cultures:                   Lines/Drains:  Invasive Devices     Peripheral Intravenous Line  Duration           Long-Dwell Peripheral IV (Midline) 08/18/23 Right Brachial 2 days          Drain  Duration           Gastrostomy/Enterostomy Percutaneous Endoscopic Gastrostomy (PEG) 20 Fr. Umbilicus 3 days              Telemetry:      Imaging:  Imaging Reports Reviewed Today Include:   XR chest portable    Result Date: 8/11/2023  Impression: Minimal bibasilar subsegmental atelectasis. Small left pleural effusion. Workstation performed: [id number]     CT chest abdomen pelvis wo contrast    Result Date: 8/10/2023  Impression: Images are motion affected; the right lower lobe main bronchus appears occluded (2:48). The right lower lung remains aerated. Bibasilar nodular infiltrates are noted. . Recommend pulmonary consultation and follow-up to resolution. Small to moderate left pleural effusion. There appears to be a split pleura sign however evaluation is limited due to the lack of IV contrast. Recommend clinical correlation. . The study was marked in EPIC for immediate notification. Workstation performed: [id number]     CT head without contrast    Result Date: 8/10/2023  Impression: Suboptimal positioning. No acute intracranial abnormality.  Workstation performed: [id number]       Scheduled Meds:  Current Facility-Administered Medications   Medication Dose Route Frequency Provider Last Rate   • albuterol  2 puff Inhalation Q6H PRN Lianet Marroquin PA-C     • aluminum-magnesium hydroxide-simethicone  30 mL Oral Q4H PRN Derek Mukherjee PA-C     • apixaban  5 mg Per PEG Tube BID Lance Gallegos MD     • buPROPion  50 mg Oral BID Chan Diego PA-C     • cinacalcet  30 mg Oral Daily With Breakfast Lianet Marroquin PA-C     • DULoxetine  60 mg Oral BID Lianet Marroquin PA-C     • hydroxychloroquine  200 mg Oral BID With Meals Ambar Gaytan MD     • metoprolol succinate  25 mg Oral Daily Lianet Marroquin PA-C     • morphine injection  2 mg Intravenous Q4H PRN Lance Gallegos MD     • morphine injection  4 mg Intravenous Q4H PRN Lance Gallegos MD     • ondansetron  4 mg Intravenous Q6H PRN Jeannette Leone PA-C     • pantoprazole  40 mg Intravenous Q24H [facility] Etienne Danielson MD         Today, Patient Was Seen By: Chad Quesada DO    ** Please Note: Dictation voice to text software may have been used in the creation of this document.  **

## 2023-08-21 NOTE — PLAN OF CARE
If plans to continue with continous TF recommend holding TF 1 hr before and 1 hr after prilosec administration, for now adjusted TF to 44mL/hr x 22hrs d/t prilosec hold time, water flushes 135mL every 4hrs provides total of 1452cal, 62g pro, 1545mL, will monitor bowels and need for any adjustments. Can also consider adjusting to bolus feeds Jevity 1.5 jacklyn 8 oz (237ml) bolus x 4/day with 100ml free water flush before and after each feed, will provide 1420kcal, 60g protein, 1520ml free water. Problem: Nutrition/Hydration-ADULT  Goal: Nutrient/Hydration intake appropriate for improving, restoring or maintaining nutritional needs  Description: Monitor and assess patient's nutrition/hydration status for malnutrition. Collaborate with interdisciplinary team and initiate plan and interventions as ordered. Monitor patient's weight and dietary intake as ordered or per policy. Utilize nutrition screening tool and intervene as necessary. Determine patient's food preferences and provide high-protein, high-caloric foods as appropriate.      INTERVENTIONS:  - Monitor oral intake, urinary output, labs, and treatment plans  - Assess nutrition and hydration status and recommend course of action  - Evaluate amount of meals eaten  - Do not leave patient alone while eating due to aspiration risk  - Assist patient with eating if necessary   - Allow adequate time for meals  - Recommend/ encourage appropriate diets, oral nutritional supplements, and vitamin/mineral supplements  - Order, calculate, and assess calorie counts as needed  - Assess need for intravenous fluids  - Provide specific nutrition/hydration education as appropriate  - Include patient in decisions related to nutrition  Outcome: Progressing No

## 2023-08-21 NOTE — ASSESSMENT & PLAN NOTE
· PEG tube placed this admission. Appreciate nutrition recommendations. · Discussed continuous versus bolus feeds.   Family wishes to continue continuous feeds due to history of GERD and reflux

## 2023-08-21 NOTE — ASSESSMENT & PLAN NOTE
History of stroke with dysphagia, RA, bipolar depression, and systolic CHF who presented to the hospital found to have aspiration pneumonitis  · Admitted to ICU. Completed course of antibiotics. · Feeding tube placed and started on tube feeds.

## 2023-08-21 NOTE — PLAN OF CARE
Problem: PHYSICAL THERAPY ADULT  Goal: Performs mobility at highest level of function for planned discharge setting. See evaluation for individualized goals. Description: Treatment/Interventions: Functional transfer training, LE strengthening/ROM, Elevations, Therapeutic exercise, Endurance training, Patient/family training, Bed mobility, Gait training, Spoke to nursing, OT  Equipment Recommended: Jazmín Altamirano (pt has)       See flowsheet documentation for full assessment, interventions and recommendations. Outcome: Not Progressing  Note: Prognosis: Fair  Problem List: Decreased strength, Decreased endurance, Impaired balance, Decreased mobility, Decreased cognition, Impaired judgement, Decreased safety awareness, Pain, Orthopedic restrictions  Assessment: Pt seen for PT treatment session this date with interventions consisting of bed mobility, transfer training, gait training and HEP, and education provided as needed for safety and direction to improve functional mobility, safety awareness, and activity tolerance. Pt agreeable to PT treatment session upon arrival, pt found seated out of bed in chair. At end of session, pt left supine in bed with all needs in reach. In comparison to previous session, pt with no improvement activity tolerance, endurance, standing balance, ambulation distances, ambulatory balance, AM- pac score and functional mobility. Pt is requiring more assistance this session for transfers, ambulation and bed mobility. Pt  Unable to ambulate first attempt due to fatigue, weakness and dizziness reported. Pt requesting to sit. Pt rested then ambulated 2' x1 with mod assist x2 with verbal cues for posture, turning, backing up to bed, body positioning and hand placement. Pt performs sit to supine with mod assist x1.   Pt presents with poor activity tolerance, functional endurance, mobility, strength, balance, safety, cognition requiring increased assistance and increased time to perform and complete all mobility. Pt  Perform supine a-aarom to b/l le's  With verbal and tactile cues for correct performance and exercise techniques. Continue to recommend home with HHPT and 24 hour support/ assist pending progress and achievement of PT goals vs STR at time of d/c in order to maximize pt's functional independence and safety w/ mobility. Pt continues to be functioning below baseline level. PT will continue to see pt while here in order to address the deficits listed above and provide interventions consistent w/ POC in effort to achieve STGs. The patient's AM-PAC Basic Mobility Inpatient Short Form Raw Score is 8. A raw score less than 16 suggests the patient may benefit from discharge to post-acute rehabilitation services. Please also refer to the recommendation of the Physical Therapist for safe discharge planning. PT Discharge Recommendation: Home with home health rehabilitation (with 24/7 hour support/ assist pending progress and achievement of PT goals vs STR)    See flowsheet documentation for full assessment.

## 2023-08-21 NOTE — PLAN OF CARE
Problem: Potential for Falls  Goal: Patient will remain free of falls  Description: INTERVENTIONS:  - Educate patient/family on patient safety including physical limitations  - Instruct patient to call for assistance with activity   - Consult OT/PT to assist with strengthening/mobility   - Keep Call bell within reach  - Keep bed low and locked with side rails adjusted as appropriate  - Keep care items and personal belongings within reach  - Initiate and maintain comfort rounds  - Make Fall Risk Sign visible to staff  - Offer Toileting every 2 Hours, in advance of need  - Initiate/Maintain bed/chair alarm  - Obtain necessary fall risk management equipment: bed/chair alarm, call bell, gripper socks, walker, bedside commode  - Apply yellow socks and bracelet for high fall risk patients  - Consider moving patient to room near nurses station  Outcome: Progressing     Problem: MOBILITY - ADULT  Goal: Maintain or return to baseline ADL function  Description: INTERVENTIONS:  - Educate patient/family on patient safety including physical limitations  - Instruct patient to call for assistance with activity   - Consult OT/PT to assist with strengthening/mobility   - Keep Call bell within reach  - Keep bed low and locked with side rails adjusted as appropriate  - Keep care items and personal belongings within reach  - Initiate and maintain comfort rounds  - Make Fall Risk Sign visible to staff  - Offer Toileting every 2 Hours, in advance of need  - Initiate/Maintain bed/chair alarm  - Obtain necessary fall risk management equipment: bed/chair alarm, call bell, gripper socks, walker, bedside commode  - Apply yellow socks and bracelet for high fall risk patients  - Consider moving patient to room near nurses station  Outcome: Progressing  Goal: Maintains/Returns to pre admission functional level  Description: INTERVENTIONS:  - Perform BMAT or MOVE assessment daily.   - Set and communicate daily mobility goal to care team and patient. - Collaborate with rehabilitation services on mobility goals if consulted  - Reposition patient every 2 hours.   - Dangle patient 3 times a day  - Stand patient 3 times a day  - Ambulate patient 3 times a day  - Out of bed to chair 3 times a day   - Out of bed for meals  - Out of bed for toileting  - Record patient progress and toleration of activity level   Outcome: Progressing     Problem: PAIN - ADULT  Goal: Verbalizes/displays adequate comfort level or baseline comfort level  Description: Interventions:  - Encourage patient to monitor pain and request assistance  - Assess pain using appropriate pain scale  - Administer analgesics based on type and severity of pain and evaluate response  - Implement non-pharmacological measures as appropriate and evaluate response  - Consider cultural and social influences on pain and pain management  - Notify physician/advanced practitioner if interventions unsuccessful or patient reports new pain  Outcome: Progressing     Problem: INFECTION - ADULT  Goal: Absence or prevention of progression during hospitalization  Description: INTERVENTIONS:  - Assess and monitor for signs and symptoms of infection  - Monitor lab/diagnostic results  - Monitor all insertion sites, i.e. indwelling lines, tubes, and drains  - Monitor endotracheal if appropriate and nasal secretions for changes in amount and color  - Beggs appropriate cooling/warming therapies per order  - Administer medications as ordered  - Instruct and encourage patient and family to use good hand hygiene technique  - Identify and instruct in appropriate isolation precautions (contact isolation for CRE) for identified infection/condition  Outcome: Progressing     Problem: SAFETY ADULT  Goal: Patient will remain free of falls  Description: INTERVENTIONS:  - Educate patient/family on patient safety including physical limitations  - Instruct patient to call for assistance with activity   - Consult OT/PT to assist with strengthening/mobility   - Keep Call bell within reach  - Keep bed low and locked with side rails adjusted as appropriate  - Keep care items and personal belongings within reach  - Initiate and maintain comfort rounds  - Make Fall Risk Sign visible to staff  - Offer Toileting every 2 Hours, in advance of need  - Initiate/Maintain bed/chair alarm  - Obtain necessary fall risk management equipment: bed/chair alarm, call bell, gripper socks, walker, bedside commode  - Apply yellow socks and bracelet for high fall risk patients  - Consider moving patient to room near nurses station  Outcome: Progressing  Goal: Maintain or return to baseline ADL function  Description: INTERVENTIONS:  - Educate patient/family on patient safety including physical limitations  - Instruct patient to call for assistance with activity   - Consult OT/PT to assist with strengthening/mobility   - Keep Call bell within reach  - Keep bed low and locked with side rails adjusted as appropriate  - Keep care items and personal belongings within reach  - Initiate and maintain comfort rounds  - Make Fall Risk Sign visible to staff  - Offer Toileting every 2 Hours, in advance of need  - Initiate/Maintain bed/chair alarm  - Obtain necessary fall risk management equipment: bed/chair alarm, call bell, gripper socks, walker, bedside commode  - Apply yellow socks and bracelet for high fall risk patients  - Consider moving patient to room near nurses station  Outcome: Progressing  Goal: Maintains/Returns to pre admission functional level  Description: INTERVENTIONS:  - Perform BMAT or MOVE assessment daily.   - Set and communicate daily mobility goal to care team and patient. - Collaborate with rehabilitation services on mobility goals if consulted  - Reposition patient every 2 hours.   - Dangle patient 3 times a day  - Stand patient 3 times a day  - Ambulate patient 3 times a day  - Out of bed to chair 3 times a day   - Out of bed for meals  - Out of bed for toileting  - Record patient progress and toleration of activity level   Outcome: Progressing     Problem: Knowledge Deficit  Goal: Patient/family/caregiver demonstrates understanding of disease process, treatment plan, medications, and discharge instructions  Description: Complete learning assessment and assess knowledge base.   Interventions:  - Provide teaching at level of understanding  - Provide teaching via preferred learning methods  Outcome: Progressing     Problem: DISCHARGE PLANNING  Goal: Discharge to home or other facility with appropriate resources  Description: INTERVENTIONS:  - Identify barriers to discharge w/patient  - Arrange for needed discharge resources and transportation as appropriate  - Identify discharge learning needs   - Refer to Case Management Department for coordinating discharge planning if the patient needs post-hospital services based on physician/advanced practitioner order or complex needs related to functional status, cognitive ability, or social support system  Outcome: Progressing

## 2023-08-22 PROBLEM — D64.89 OTHER SPECIFIED ANEMIAS: Status: ACTIVE | Noted: 2021-06-26

## 2023-08-22 PROBLEM — A41.9 SEPSIS (HCC): Status: RESOLVED | Noted: 2023-08-10 | Resolved: 2023-08-22

## 2023-08-22 NOTE — ASSESSMENT & PLAN NOTE
· Continue wellbutrin and duloxetine. · Patient chronically on gabapentin and lamictal.  Restarted yesterday but lethargic today.   Will decrease dosing

## 2023-08-22 NOTE — ASSESSMENT & PLAN NOTE
· Likely secondary to hyperparathyroidism. Follows with endocrinology as an outpatient. · Currently on sensipar however medication cannot be crushed. · Discussed with Dr. Rayshawn Higuera endocrinology today. Calcium within limits and does not need alternate medication for hyperparathyroidism.

## 2023-08-22 NOTE — ASSESSMENT & PLAN NOTE
Wt Readings from Last 3 Encounters:   08/22/23 56.2 kg (123 lb 14.4 oz)   07/29/23 49.4 kg (108 lb 14.4 oz)   06/30/23 55.3 kg (122 lb)     -lasix 20 given at rapid response  -cont diureses as tolerated  -monitor I/o's  -ef 30%/ mod AR  -cont BB for now.  Hold if hypotension or bradycardia

## 2023-08-22 NOTE — ASSESSMENT & PLAN NOTE
Most likely contributing to her respiratory status initially  -peg tube placed 8/17  -family previously requests continuous tube feeds. Will hold tonight 2/2 respiratory status. Will restart in am as able.   -cont protonix

## 2023-08-22 NOTE — TREATMENT PLAN
Increased work of breathing appears to be mouth breathing. Aerosol mask placed. CXR reviewed with worsening left greater than right opacities. Cannot rule out aspiration. Will start zosyn check procalcitonin.

## 2023-08-22 NOTE — ASSESSMENT & PLAN NOTE
· Patient had right MCA stroke, s/p mechanical thrombectomy earlier this year  · Although the patient moves all the limbs, there is residual weakness in the left upper and lower limb  · CT head on August 8 shows No acute intracranial abnormality    Plan:  · Continue Eliquis  · Frequent neurochecks

## 2023-08-22 NOTE — ASSESSMENT & PLAN NOTE
-hgb trending down  -multiple healing hematomas  -no obvious bleeding at this time  -monitor hgb and transfuse if <7

## 2023-08-22 NOTE — CONSULTS
Consultation - Pulmonary Medicine   Olamide Kennedy 70 y.o. female MRN: [id number]  Unit/Bed#: E2 -01 Encounter: [id number]      Assessment/Plan:    1. Acute hypoxic resp failure  - in the setting of recurrent aspiration. Even with PEG insertion patient might still at risk of aspirate for her own saliva or vomiting  - now under Bipap support. Cannot get a good oxygen setting. Patient still have increased work of breathing  - CXR reviewed. Worsening LLL consolidation. Compares to admission CXR and CT chest patient might have a new episode of pneumonia vs worsening partial mucous plugging  - pending CBC. Agree with empiric abx use  - patient might not a good candidate for bipap for now for poor mental status and increase risk of aspiration. She needs frequent evaluation. She is high risk of intubation and given her underline (CHF, stroke), she is at high risk of difficult weaning. Will suggest patient to be admitted to [facility] for escalation of care. Confirmed FULL code during hospitalization  -continue vest CPT if tolerate. Noted hx of rib fracture. - add on mucomyst for airway clearance with albuterol. 2. Possible pneumonia  - discussed as above. Suggest to recollect for sputum culture, check for PCT and tailor abx treatment     3. Hypoglycemia  - multiple episode during hospitalization. Now NPO due to Bipap and resp status. Will need to watch closely    History of Present Illness   Physician Requesting Consult: Ingrid Vasquez DO  Reason for Consult / Principal Problem: hypoxia  Hx and PE limited by: altered mental status  Chief Complaint: shortness of breath  HPI: Olamide Kennedy is a 70 y.o.  F with hx of stroke s/p Lt MCA thrombectomy in 2/2023, dara, asthma, HFrEF (EF 30%) and moderate AR, previous fall with rib fx, was initially admitted to our hospital on 8/10 due to altered mental status and hypoxia secondary to aspiration.  At home she required 2L N/C support, and was treated with Bipap then HFNC while in the hospital. Initial CT chest showed occluded RLL with small to moderate pleura effusion. At baseline she seemed to be AMOx3. There was some discussion during this hospitalization and patient deemed to be FULL CODE. PEG was placed due to high risk of aspiration on 8/17. Her hospital course also complicated with few episodes of hypoglycemia. Her oxygen demand was slowly decreased to 2L which was her baseline at home. She also completed her course of abx to treat pneumonia. On 8/22, a rapid response was called, she was found altered, hypoxic with increasging shortness of breath. We were consulted for hypoxic failure. VBG did not reveal CO2 retention, an CXr was ordered and showed new opacities on left lower side. Empiric abx was started to cover pneumonia    Examined at bedside around 4pm. Patient was awake, following simple commands but not being able to have conversation. She was breathing under Bipap and the oxygenation level was very hard to get a correct reading due to poor distal circulation.        Consults    Review of Systems   Unable to perform ROS: Acuity of condition       Historical Information   Past Medical History:   Diagnosis Date   • Anxiety    • Asthma    • Bipolar depression ([address])    • Chronic narcotic dependence ([address])    • Chronic pain    • Depression    • Enterovirus heart infection    • SHAR (obstructive sleep apnea)    • Osteoarthritis    • Peripheral neuropathy    • Plantar fasciitis of right foot    • Senile osteoporosis    • Seronegative arthropathy of multiple sites ([address])    • Undifferentiated connective tissue disease ([address])      Past Surgical History:   Procedure Laterality Date   • BREAST IMPLANT     • FIXATION KYPHOPLASTY LUMBAR SPINE     • HYSTERECTOMY     • INCONTINENCE SURGERY N/A    • IR STROKE ALERT  2/12/2023   • NASAL SEPTOPLASTY W/ TURBINOPLASTY N/A    • RECTAL PROLAPSE REPAIR N/A    • REPAIR RECTOCELE     • RHINOPLASTY N/A      Social History   Social History Substance and Sexual Activity   Alcohol Use Not Currently    Comment: quit     Social History     Substance and Sexual Activity   Drug Use Not Currently   • Types: Marijuana    Comment: has medical card     Social History     Tobacco Use   Smoking Status Former   Smokeless Tobacco Never     E-Cigarette/Vaping   • E-Cigarette Use Never User      E-Cigarette/Vaping Substances   • Nicotine No    • THC No    • CBD No    • Flavoring No    • Other No    • Unknown No      Occupational History: non contributary    Family History: non-contributory    Meds/Allergies   all current active meds have been reviewed    Allergies   Allergen Reactions   • Cephalosporins Other (See Comments)     rash   • Iodine - Food Allergy Anaphylaxis     Anaphylaxis     • Methocarbamol Hives   • Sulfa Antibiotics Hives   • Bactrim [Sulfamethoxazole-Trimethoprim] Hives   • Erythromycin Hives   • Penicillins    • Shrimp Extract Allergy Skin Test - Food Allergy Other (See Comments)   • Trimethoprim Hives       Objective   Vitals: Blood pressure 92/58, pulse 91, temperature 97.9 °F (36.6 °C), temperature source Temporal, resp. rate (!) 32, height 5' 4" (1.626 m), weight 56.2 kg (123 lb 14.4 oz), SpO2 (!) 87 %. Bipap FiO2 100,Body mass index is 21.27 kg/m². Intake/Output Summary (Last 24 hours) at 8/22/2023 1614  Last data filed at 8/22/2023 1001  Gross per 24 hour   Intake 135 ml   Output --   Net 135 ml     Invasive Devices     Peripheral Intravenous Line  Duration           Long-Dwell Peripheral IV (Midline) 08/18/23 Right Brachial 4 days          Drain  Duration           Gastrostomy/Enterostomy Percutaneous Endoscopic Gastrostomy (PEG) 20 Fr. Umbilicus 4 days                Physical Exam  Constitutional:       General: She is in acute distress. Appearance: She is ill-appearing. Cardiovascular:      Rate and Rhythm: Normal rate and regular rhythm. Heart sounds: Murmur heard. Pulmonary:      Effort: Respiratory distress present. Breath sounds: Rales present. No wheezing. Abdominal:      Palpations: Abdomen is soft. Comments: PEG     Musculoskeletal:         General: Normal range of motion. Skin:     General: Skin is dry. Neurological:      Mental Status: She is lethargic. Lab Results: I have personally reviewed pertinent lab results. Imaging Studies: I have personally reviewed pertinent films in PACS       EKG, Pathology, and Other Studies: I have personally reviewed pertinent reports. Code Status: Level 1 - Full Code      Portions of the record may have been created with voice recognition software. Occasional wrong word or "sound a like" substitutions may have occurred due to the inherent limitations of voice recognition software. Read the chart carefully and recognize, using context, where substitutions have occurred.

## 2023-08-22 NOTE — ASSESSMENT & PLAN NOTE
History of stroke with dysphagia, RA, bipolar depression, and systolic CHF who presented to the hospital found to have aspiration pneumonitis  · Admitted to ICU. Completed course of antibiotics. · Feeding tube placed and started on tube feeds. · Worsening hypoxia today.   Will recheck CXR

## 2023-08-22 NOTE — PROGRESS NOTES
[address] [facility]  Progress Note  Name: Anjel Torres  MRN: [id number]  Unit/Bed#: ICU 10 I Date of Admission: 8/10/2023   Date of Service: 8/22/2023 I Hospital Day: 12    Assessment/Plan   * Acute respiratory failure with hypoxia [facility])  Assessment & Plan  · Patient was brought to the ED after being in altered mental status, requiring more oxygen. · History of multiple episodes of vomiting at night prior to admission  · At the time of presentation in the ED, patient's blood glucose 58  · Historically patient has poor appetite and barely eats. · High possibility of aspiration pneumonitis vs mechanical limitation due to LSO brace as the cause  · CT shows the right lower lobe main bronchus appears occluded (2:48). The right lower lung remains aerated. Bibasilar nodular infiltrates are noted  · Rapid Response called for intermittent hypoxia and wob  · At the time of examination, patient is alert, oriented, frail  · cxr shows increased b/l lower opacity    Plan:  Continue supplementary oxygen to maintain O2 sats above 90%  BIPAP at this time- wean as able. If worsening resp status will require ett placement  Albuterol prn   Aspiration precautions  Lasix 20 given  Zosyn started- pt finished levoquin and has multiple allergies  Hold tube feeds for now  Transfer to [facility] for closer monitoring            Dysphagia  Assessment & Plan  Most likely contributing to her respiratory status initially  -peg tube placed 8/17  -family previously requests continuous tube feeds. Will hold tonight 2/2 respiratory status. Will restart in am as able. -cont protonix      HFrEF (heart failure with reduced ejection fraction) ([facility])  Assessment & Plan  Wt Readings from Last 3 Encounters:   08/22/23 56.2 kg (123 lb 14.4 oz)   07/29/23 49.4 kg (108 lb 14.4 oz)   06/30/23 55.3 kg (122 lb)     -lasix 20 given at rapid response  -cont diureses as tolerated  -monitor I/o's  -ef 30%/ mod AR  -cont BB for now.  Hold if hypotension or bradycardia        Altered mental status  Assessment & Plan  · Patient was drowsy but responds appropriately at this time. · Restarted home meds and sleep deprivation could be contributing  · CT shows 8/10  No acute intracranial abnormality  · Patient is alert oriented to voice, moves all limbs, but has residual weakness from right MCA stroke in February 2023 at this time. Plan:  · Continue monitoring fingerstick blood glucose every 6 hours  · Hypoglycemia protocol  · Frequent neurochecks    Hypoglycemia  Assessment & Plan  · Patient presented to the ED with altered mental status  · At the time of admission in the ED, her blood sugar was 458 was given 50 mL of D50.   · Poor oral intake  · Blood glucose level of 48 at 1220 on 8/11  · Now resolved  · Tube feeds started and now held    Plan:  · Blood glucose checks every 6 hours  · Hypoglycemia protocol    Closed T12 fracture (HCC)  Assessment & Plan  Patient has a history of low impact fall that resulted in multiple T-spine fractures  Currently pain controlled by morphine    Plan:  Morphine 7.5 mg bid prn  Continue LSO  Patient can be transferred to chair from bed with assistance    History of stroke  Assessment & Plan  · Patient had right MCA stroke, s/p mechanical thrombectomy earlier this year  · Although the patient moves all the limbs, there is residual weakness in the left upper and lower limb  · CT head on August 8 shows No acute intracranial abnormality    Plan:  · Continue Eliquis  · Frequent neurochecks      Hypercalcemia  Assessment & Plan  -likely 2/2 hyperparathyroidism  -follows with endocrine  -sensipar on hold 2/2 unable to crush    Rheumatoid arthritis of multiple sites with negative rheumatoid factor ([address])  Assessment & Plan  · Patient has a history of seronegative multiple joint arthropathy  · Currently on hydroxychloroquine 200 mg twice daily  · Patient follows up with rheumatologist    Plan:  · Continue hydroxychloroquine as per home regimen    Bipolar depression ([address])  Assessment & Plan  -no ativan  -cont wellbutrin and duloxetine  -cont gabapentin and lamictal. Lower dose started 2/2 sedation and uptitrate as able. Other specified anemias  Assessment & Plan  -hgb trending down  -multiple healing hematomas  -no obvious bleeding at this time  -monitor hgb and transfuse if <7            ICU Core Measures     A: Assess, Prevent, and Manage Pain · Has pain been assessed? Yes  · Need for changes to pain regimen? No   B: Both SAT/SAT  · N/A   C: Choice of Sedation · RASS Goal: 0 Alert and Calm  · Need for changes to sedation or analgesia regimen? No   D: Delirium · CAM-ICU: Unable to perform secondary to Acute cognitive dysfunction   E: Early Mobility  · Plan for early mobility? Yes   F: Family Engagement · Plan for family engagement today? Yes       Antibiotic Review: Continue broad spectrum secondary to severity of illness. Review of Invasive Devices:             Prophylaxis:  VTE VTE covered by:  apixaban, Per PEG Tube, 5 mg at 08/22/23 0945       Stress Ulcer  covered byomeprazole (PRILOSEC) suspension 2 mg/mL [[id number]]       Subjective   HPI/24hr events: 70 y.o. who presents with hx of stroke s/p L mca thrombectomy in 2/23, closed t12 fx with LSO brace, chronic t9,t11 fxs, kyphosis, bipolar, osteoporosis, dara, asthma, gerd, chf with ef 30% and mod AR, s/p fall on 6/30 and arc admission on 7/7/23 who was brought to ER by ems for worsening MS and hypoxia. Pt stated that she wears 2L O2 at home. Pt was initially put on NC and then midflow and transitioned to bipap in ER. Pt was awake, alert and writing. Pt treated for sepsis and given ativan and pt progressively got more tired t/o night. Pt admitted to sd1 for bipap monitoring. Pt was then transferred to [address] [place] [address] and peg tube placed on 8/17. RAPID RESPONSE called today for hypoxia and increased wob. Pt was given lasix 20 with good uop and placed on bipap with improvement in MS.  Pt transferred to [facility] for closer monitoring. Family updated at length. If worsens, pt will need ett. Review of Systems   Unable to perform ROS: Acuity of condition   Constitutional: Positive for fatigue. Respiratory: Positive for shortness of breath. Gastrointestinal: Positive for abdominal pain. Genitourinary: Negative. Neurological: Negative. Psychiatric/Behavioral: Positive for agitation. Objective                            Vitals I/O      Most Recent Min/Max in 24hrs   Temp 97.9 °F (36.6 °C) Temp  Min: 97.5 °F (36.4 °C)  Max: 99.2 °F (37.3 °C)   Pulse 91 Pulse  Min: 91  Max: 101   Resp (!) 32 Resp  Min: 20  Max: 36   BP 92/58 BP  Min: 92/58  Max: 157/69   O2 Sat (!) 89 % SpO2  Min: 87 %  Max: 98 %      Intake/Output Summary (Last 24 hours) at 8/22/2023 1825  Last data filed at 8/22/2023 1001  Gross per 24 hour   Intake 135 ml   Output --   Net 135 ml         No diet orders on file     Invasive Monitoring Physical exam      Physical Exam  Eyes:      Pupils: Pupils are equal, round, and reactive to light. Skin:     General: Skin is warm. HENT:      Head: Normocephalic. Mouth/Throat:      Mouth: Mucous membranes are dry. Cardiovascular:      Rate and Rhythm: Normal rate and regular rhythm. Pulses: Normal pulses. Heart sounds: Normal heart sounds. Musculoskeletal:      Right lower leg: No edema. Left lower leg: No edema. Abdominal:      General: There is abdominal type feeding tube. There is no distension. Palpations: Abdomen is soft. Tenderness: There is abdominal tenderness. There is no guarding. Constitutional:       Appearance: She is ill-appearing. Pulmonary:      Effort: Tachypnea present. Breath sounds: Rhonchi present. Neurological:      General: No focal deficit present. Mental Status: She is alert and oriented to person, place and time. She is agitated.    Genitourinary/Anorectal:  external catheter         Diagnostic Studies EKG: nsr  Imaging:  I have personally reviewed pertinent reports. and I have personally reviewed pertinent films in PACS     Medications:  Scheduled PRN   acetylcysteine, 3 mL, Q8H  albuterol, 2.5 mg, TID  apixaban, 5 mg, BID  buPROPion, 50 mg, BID  DULoxetine, 60 mg, BID  gabapentin, 100 mg, BID  hydroxychloroquine, 200 mg, BID With Meals  lamoTRIgine, 50 mg, HS  metoprolol tartrate, 12.5 mg, Q12H KHARI  omeprazole (PRILOSEC) suspension 2 mg/mL, 20 mg, Daily  piperacillin-tazobactam, 3.375 g, Q6H      acetaminophen, 650 mg, Q4H PRN  albuterol, 2 puff, Q6H PRN  aluminum-magnesium hydroxide-simethicone, 30 mL, Q4H PRN  morphine, 7.5 mg, Q6H PRN  ondansetron, 4 mg, Q6H PRN       Continuous          Labs:    CBC    Recent Labs     08/21/23 0518 08/22/23 0523   WBC 9.72 10.96*   HGB 7.6* 7.5*   HCT 24.6* 24.9*    184     BMP    Recent Labs     08/21/23 0518 08/22/23  0523   SODIUM 138 138   K 4.7 4.8    104   CO2 30 30   AGAP 3 4   BUN 21 23   CREATININE 0.67 0.71   CALCIUM 8.1* 8.5       Coags    No recent results     Additional Electrolytes  Recent Labs     08/21/23  0518   MG 1.7*   PHOS 2.3          Blood Gas    No recent results  Recent Labs     08/22/23  1353   PHVEN 7.350   BON7LEZ 44.5   PO2VEN 27.1*   KUW2ABG 24.0   BEVEN -1.6    LFTs  Recent Labs     08/21/23 0518 08/22/23  0523   ALT 16 37   AST 34 103*   ALKPHOS 72 81   ALB 2.3* 2.3*   TBILI 0.49 0.53       Infectious  No recent results  Glucose  Recent Labs     08/21/23  0518 08/22/23  0523   GLUC 103 116               Critical Care Time Delivered: Upon my evaluation, this patient had a high probability of imminent or life-threatening deterioration due to hypoxia, which required my direct attention, intervention, and personal management. I have personally provided 25 minutes of critical care time, exclusive of procedures, teaching, family meetings, and any prior time recorded by providers other than myself.      Kym Whitfield, OWEN

## 2023-08-22 NOTE — PLAN OF CARE
Problem: PAIN - ADULT  Goal: Verbalizes/displays adequate comfort level or baseline comfort level  Description: Interventions:  - Encourage patient to monitor pain and request assistance  - Assess pain using appropriate pain scale  - Administer analgesics based on type and severity of pain and evaluate response  - Implement non-pharmacological measures as appropriate and evaluate response  - Consider cultural and social influences on pain and pain management  - Notify physician/advanced practitioner if interventions unsuccessful or patient reports new pain  Outcome: Progressing     Problem: PAIN - ADULT  Goal: Verbalizes/displays adequate comfort level or baseline comfort level  Description: Interventions:  - Encourage patient to monitor pain and request assistance  - Assess pain using appropriate pain scale  - Administer analgesics based on type and severity of pain and evaluate response  - Implement non-pharmacological measures as appropriate and evaluate response  - Consider cultural and social influences on pain and pain management  - Notify physician/advanced practitioner if interventions unsuccessful or patient reports new pain  Outcome: Progressing     Problem: INFECTION - ADULT  Goal: Absence or prevention of progression during hospitalization  Description: INTERVENTIONS:  - Assess and monitor for signs and symptoms of infection  - Monitor lab/diagnostic results  - Monitor all insertion sites, i.e. indwelling lines, tubes, and drains  - Monitor endotracheal if appropriate and nasal secretions for changes in amount and color  - [place] appropriate cooling/warming therapies per order  - Administer medications as ordered  - Instruct and encourage patient and family to use good hand hygiene technique  - Identify and instruct in appropriate isolation precautions (contact isolation for CRE) for identified infection/condition  Outcome: Progressing     Problem: Nutrition/Hydration-ADULT  Goal: Nutrient/Hydration intake appropriate for improving, restoring or maintaining nutritional needs  Description: Monitor and assess patient's nutrition/hydration status for malnutrition. Collaborate with interdisciplinary team and initiate plan and interventions as ordered. Monitor patient's weight and dietary intake as ordered or per policy. Utilize nutrition screening tool and intervene as necessary. Determine patient's food preferences and provide high-protein, high-caloric foods as appropriate.      INTERVENTIONS:  - Monitor oral intake, urinary output, labs, and treatment plans  - Assess nutrition and hydration status and recommend course of action  - Evaluate amount of meals eaten  - Do not leave patient alone while eating due to aspiration risk  - Assist patient with eating if necessary   - Allow adequate time for meals  - Recommend/ encourage appropriate diets, oral nutritional supplements, and vitamin/mineral supplements  - Order, calculate, and assess calorie counts as needed  - Assess need for intravenous fluids  - Provide specific nutrition/hydration education as appropriate  - Include patient in decisions related to nutrition  Outcome: Progressing

## 2023-08-22 NOTE — PHYSICAL THERAPY NOTE
Physical Therapy Cancellation Note              Pt was a rapid response due to difficulty breathing and hypoxia. Pt now on bi-pap. Will cancel PT at this time and continue to follow as per POC at a later time as medially appropriate.      Jaylen Conley, PTA

## 2023-08-22 NOTE — ASSESSMENT & PLAN NOTE
· Patient was drowsy but responds appropriately at this time. · Restarted home meds and sleep deprivation could be contributing  · CT shows 8/10  No acute intracranial abnormality  · Patient is alert oriented to voice, moves all limbs, but has residual weakness from right MCA stroke in February 2023 at this time.   Plan:  · Continue monitoring fingerstick blood glucose every 6 hours  · Hypoglycemia protocol  · Frequent neurochecks

## 2023-08-22 NOTE — SPEECH THERAPY NOTE
Speech Language/Pathology    Speech/Language Pathology Progress Note    Patient Name: Igor Maciel  QOEFM'K Date: 8/22/2023     Problem List  Principal Problem:    Acute respiratory failure with hypoxia [facility])  Active Problems:    Bipolar depression ([address])    Rheumatoid arthritis of multiple sites with negative rheumatoid factor (HCC)    HFrEF (heart failure with reduced ejection fraction) (HCC)    Hypercalcemia    History of stroke    Altered mental status    Dysphagia    Closed T12 fracture (HCC)    Sepsis ([facility])    Electrolyte imbalance    Hypoglycemia       Past Medical History  Past Medical History:   Diagnosis Date   • Anxiety    • Asthma    • Bipolar depression ([address])    • Chronic narcotic dependence ([address])    • Chronic pain    • Depression    • Enterovirus heart infection    • SHAR (obstructive sleep apnea)    • Osteoarthritis    • Peripheral neuropathy    • Plantar fasciitis of right foot    • Senile osteoporosis    • Seronegative arthropathy of multiple sites ([address])    • Undifferentiated connective tissue disease (HCC)         Past Surgical History  Past Surgical History:   Procedure Laterality Date   • BREAST IMPLANT     • FIXATION KYPHOPLASTY LUMBAR SPINE     • HYSTERECTOMY     • INCONTINENCE SURGERY N/A    • IR STROKE ALERT  2/12/2023   • NASAL SEPTOPLASTY W/ TURBINOPLASTY N/A    • RECTAL PROLAPSE REPAIR N/A    • REPAIR RECTOCELE     • RHINOPLASTY N/A          Subjective:  Pt was alert and positioned upright as able. Objective:  Pt was seen for f/u dysphagia therapy. Pt NPO as PEG in place. Daughter at beside. Reported intermittently providing oral swabs to pt. ST provided pleasure feeds 2 small ice chips and 2 (1/2 tsp) of puree. Continues with reduced bolus control, formation, and transfer. Swallows were delayed benefited from cueing to initiate. Benefits from cue to keep chin down as pt neck flexed.  No cough or th ST provided verbal education to daughter on current diet recommendation and results of MBS completed. Daughter understood. Assessment:  Pt continues with severe oral/pharyngeal dysphagia. Placed with PEG. Pleasure feeds as tolerated. Pt noted with increased labored breathing. Daughter reported she has been doing that all day. Daughter also stated pt has been reporting pain in PEG site would like someone to look at it. ST notified nursing. Plan/Recommendations:  Continue PEG as primary source of nutrition. Allow pleasure feeds as appropriate. Ensure good oral care. ST will d/c.

## 2023-08-22 NOTE — PLAN OF CARE
Problem: Potential for Falls  Goal: Patient will remain free of falls  Description: INTERVENTIONS:  - Educate patient/family on patient safety including physical limitations  - Instruct patient to call for assistance with activity   - Consult OT/PT to assist with strengthening/mobility   - Keep Call bell within reach  - Keep bed low and locked with side rails adjusted as appropriate  - Keep care items and personal belongings within reach  - Initiate and maintain comfort rounds  - Make Fall Risk Sign visible to staff  - Offer Toileting every 2 Hours, in advance of need  - Initiate/Maintain bed/chair alarm  - Obtain necessary fall risk management equipment: bed/chair alarm, call bell, gripper socks, walker, bedside commode  - Apply yellow socks and bracelet for high fall risk patients  - Consider moving patient to room near nurses station  Outcome: Progressing     Problem: MOBILITY - ADULT  Goal: Maintain or return to baseline ADL function  Description: INTERVENTIONS:  - Educate patient/family on patient safety including physical limitations  - Instruct patient to call for assistance with activity   - Consult OT/PT to assist with strengthening/mobility   - Keep Call bell within reach  - Keep bed low and locked with side rails adjusted as appropriate  - Keep care items and personal belongings within reach  - Initiate and maintain comfort rounds  - Make Fall Risk Sign visible to staff  - Offer Toileting every 2 Hours, in advance of need  - Initiate/Maintain bed/chair alarm  - Obtain necessary fall risk management equipment: bed/chair alarm, call bell, gripper socks, walker, bedside commode  - Apply yellow socks and bracelet for high fall risk patients  - Consider moving patient to room near nurses station  Outcome: Not Progressing  Goal: Maintains/Returns to pre admission functional level  Description: INTERVENTIONS:  - Perform BMAT or MOVE assessment daily.   - Set and communicate daily mobility goal to care team and patient. - Collaborate with rehabilitation services on mobility goals if consulted  - Reposition patient every 2 hours.   - Dangle patient 3 times a day  - Stand patient 3 times a day  - Ambulate patient 3 times a day  - Out of bed to chair 3 times a day   - Out of bed for meals  - Out of bed for toileting  - Record patient progress and toleration of activity level   Outcome: Not Progressing     Problem: PAIN - ADULT  Goal: Verbalizes/displays adequate comfort level or baseline comfort level  Description: Interventions:  - Encourage patient to monitor pain and request assistance  - Assess pain using appropriate pain scale  - Administer analgesics based on type and severity of pain and evaluate response  - Implement non-pharmacological measures as appropriate and evaluate response  - Consider cultural and social influences on pain and pain management  - Notify physician/advanced practitioner if interventions unsuccessful or patient reports new pain  Outcome: Progressing     Problem: INFECTION - ADULT  Goal: Absence or prevention of progression during hospitalization  Description: INTERVENTIONS:  - Assess and monitor for signs and symptoms of infection  - Monitor lab/diagnostic results  - Monitor all insertion sites, i.e. indwelling lines, tubes, and drains  - Monitor endotracheal if appropriate and nasal secretions for changes in amount and color  - [place] appropriate cooling/warming therapies per order  - Administer medications as ordered  - Instruct and encourage patient and family to use good hand hygiene technique  - Identify and instruct in appropriate isolation precautions (contact isolation for CRE) for identified infection/condition  Outcome: Progressing     Problem: SAFETY ADULT  Goal: Patient will remain free of falls  Description: INTERVENTIONS:  - Educate patient/family on patient safety including physical limitations  - Instruct patient to call for assistance with activity   - Consult OT/PT to assist with strengthening/mobility   - Keep Call bell within reach  - Keep bed low and locked with side rails adjusted as appropriate  - Keep care items and personal belongings within reach  - Initiate and maintain comfort rounds  - Make Fall Risk Sign visible to staff  - Offer Toileting every 2 Hours, in advance of need  - Initiate/Maintain bed/chair alarm  - Obtain necessary fall risk management equipment: bed/chair alarm, call bell, gripper socks, walker, bedside commode  - Apply yellow socks and bracelet for high fall risk patients  - Consider moving patient to room near nurses station  Outcome: Progressing  Goal: Maintain or return to baseline ADL function  Description: INTERVENTIONS:  - Educate patient/family on patient safety including physical limitations  - Instruct patient to call for assistance with activity   - Consult OT/PT to assist with strengthening/mobility   - Keep Call bell within reach  - Keep bed low and locked with side rails adjusted as appropriate  - Keep care items and personal belongings within reach  - Initiate and maintain comfort rounds  - Make Fall Risk Sign visible to staff  - Offer Toileting every 2 Hours, in advance of need  - Initiate/Maintain bed/chair alarm  - Obtain necessary fall risk management equipment: bed/chair alarm, call bell, gripper socks, walker, bedside commode  - Apply yellow socks and bracelet for high fall risk patients  - Consider moving patient to room near nurses station  Outcome: Not Progressing  Goal: Maintains/Returns to pre admission functional level  Description: INTERVENTIONS:  - Perform BMAT or MOVE assessment daily.   - Set and communicate daily mobility goal to care team and patient. - Collaborate with rehabilitation services on mobility goals if consulted  - Reposition patient every 2 hours.   - Dangle patient 3 times a day  - Stand patient 3 times a day  - Ambulate patient 3 times a day  - Out of bed to chair 3 times a day   - Out of bed for meals  - Out of bed for toileting  - Record patient progress and toleration of activity level   Outcome: Not Progressing     Problem: DISCHARGE PLANNING  Goal: Discharge to home or other facility with appropriate resources  Description: INTERVENTIONS:  - Identify barriers to discharge w/patient  - Arrange for needed discharge resources and transportation as appropriate  - Identify discharge learning needs   - Refer to Case Management Department for coordinating discharge planning if the patient needs post-hospital services based on physician/advanced practitioner order or complex needs related to functional status, cognitive ability, or social support system  Outcome: Progressing     Problem: Knowledge Deficit  Goal: Patient/family/caregiver demonstrates understanding of disease process, treatment plan, medications, and discharge instructions  Description: Complete learning assessment and assess knowledge base.   Interventions:  - Provide teaching at level of understanding  - Provide teaching via preferred learning methods  Outcome: Not Progressing

## 2023-08-22 NOTE — RAPID RESPONSE
Rapid Response Note  Jabari Jaimes 70 y.o. female MRN: [id number]  Unit/Bed#: E2 -01 Encounter: [id number]    Rapid Response Notification(s):   Response called date/time:  8/22/2023 2:20 PM  Response team arrival date/time:  8/22/2023 2:21 PM  Response end date/time:  8/22/2023 3:00 PM  Level of care:  [facility]  Rapid response location:  [facility] unit  Primary reason for rapid response call:  Acute change in neuro status    Rapid Response Intervention(s):   Airway:  Positioning and other (comment)  Breathing:  Oxygen  Circulation:  None  Fluids administered:  None  Medications administered: Other (comment)  Comments:  Pt given 20 lasix and placed on bipap       Assessment:   · Acute respiratory distress  · pulm edema vs aspiration    Plan:   · pcxr  · Lasix 20 iv  · bipap  · Recheck pt and transfer to SD if no improvement  · Monitor tele and increase vital checks  · Recheck labs  · Discussed case/care at length with Daughter     Rapid Response Outcome:   [facility]  [facility]  Primary service notified of transfer: Yes    Code Status: Level 1 (Full Code)    Comments:  Initially declined bipap and ett then changed her mind. Family notified of transfer: yes  Family member contacted: daughter. I spoke at length with daughter who was frustrated with her care at this point. I explained her current management and status and updated her with current events and plans. Questions were answered. Background/Situation:   Jabari Jaimes is a 70 y.o. female who     Review of Systems   Unable to perform ROS: Mental status change   Constitutional: Negative. HENT: Negative. Respiratory: Positive for shortness of breath. Cardiovascular: Negative. Gastrointestinal: Positive for abdominal pain. Musculoskeletal: Positive for arthralgias and back pain. Skin: Negative. Neurological: Positive for weakness.        Objective:   Vitals:    08/22/23 1244 08/22/23 1425 08/22/23 1430 08/22/23 1506   BP: 157/69 92/58 104/52 92/58   BP Location: Left arm Left arm     Pulse: 101 91  91   Resp: (!) 36   (!) 32   Temp: 97.9 °F (36.6 °C) 97.9 °F (36.6 °C)     TempSrc: Temporal Temporal     SpO2: 95%   (!) 87%   Weight:       Height:         Physical Exam  Vitals and nursing note reviewed. Constitutional:       Appearance: She is ill-appearing. HENT:      Head: Normocephalic. Nose: Nose normal.      Mouth/Throat:      Mouth: Mucous membranes are dry. Eyes:      Conjunctiva/sclera: Conjunctivae normal.   Cardiovascular:      Rate and Rhythm: Normal rate and regular rhythm. Pulses: Normal pulses. Pulmonary:      Effort: Respiratory distress present. Breath sounds: Rhonchi present. Comments: NRB in place. Abdominal:      General: Abdomen is flat. Palpations: Abdomen is soft. Musculoskeletal:      Cervical back: Rigidity present. Skin:     General: Skin is warm and dry. Neurological:      Mental Status: She is alert and oriented to person, place, and time. Comments: Opens eyes to voice and responds appropriately to questions and commands. Portions of the record may have been created with voice recognition software. Occasional wrong word or "sound a like" substitutions may have occurred due to the inherent limitations of voice recognition software. Read the chart carefully and recognize, using context, where substitutions have occurred.     Phoenix Greenwood PA-C

## 2023-08-22 NOTE — ASSESSMENT & PLAN NOTE
Patient has a history of low impact fall that resulted in multiple T-spine fractures  Currently pain controlled by morphine    Plan:  Morphine 7.5 mg bid prn  Continue LSO  Patient can be transferred to chair from bed with assistance

## 2023-08-22 NOTE — PROGRESS NOTES
[facility] [employer] [employer]  Progress Note  Name: Saskia Krueger  MRN: [id number]  Unit/Bed#: E2 -01 I Date of Admission: 8/10/2023   Date of Service: 8/22/2023 I Hospital Day: 12    Assessment/Plan   * Acute respiratory failure with hypoxia [facility])  Assessment & Plan  History of stroke with dysphagia, RA, bipolar depression, and systolic CHF who presented to the hospital found to have aspiration pneumonitis  · Admitted to ICU. Completed course of antibiotics. · Feeding tube placed and started on tube feeds. · Worsening hypoxia today. Will recheck CXR    Hypoglycemia  Assessment & Plan  · Secondary to poor intake. Resolved with initiation of tube feeds    Results from last 7 days   Lab Units 08/22/23  1228 08/22/23  0539 08/21/23  2335 08/21/23  1727   POC GLUCOSE mg/dl 95 112 127 122       Dysphagia  Assessment & Plan  · PEG tube placed this admission. Appreciate nutrition recommendations. · Discussed continuous vs bolus feeds. Family wishes to continue continuous feeds due to history of GERD and reflux    History of stroke  Assessment & Plan  · History of stroke requiring embolectomy earlier this year with residual left-sided weakness. · Continue Eliquis    Hypercalcemia  Assessment & Plan  · Likely secondary to hyperparathyroidism. Follows with endocrinology as an outpatient. · Currently on sensipar however medication cannot be crushed. · Discussed with Dr. Guillermo Spangler endocrinology today. Calcium within limits and does not need alternate medication for hyperparathyroidism. HFrEF (heart failure with reduced ejection fraction) ([facility])  Assessment & Plan  Wt Readings from Last 3 Encounters:   08/22/23 56.2 kg (123 lb 14.4 oz)   07/29/23 49.4 kg (108 lb 14.4 oz)   06/30/23 55.3 kg (122 lb)     · Weight is elevated.   Recheck CXR to see if diuretics are warranted    Rheumatoid arthritis of multiple sites with negative rheumatoid factor ([address])  Assessment & Plan  · Continue hydroxychloroquine    Bipolar depression ([address])  Assessment & Plan  · Continue wellbutrin and duloxetine. · Patient chronically on gabapentin and lamictal.  Restarted yesterday but lethargic today. Will decrease dosing    VTE Pharmacologic Prophylaxis:   High Risk (Score >/= 5) - Pharmacological DVT Prophylaxis Ordered: apixaban (Eliquis). Sequential Compression Devices Ordered. Patient Centered Rounds: I have performed bedside rounds with nursing staff today. Discussions with Specialists or Other Care Team Provider: Case management    Education and Discussions with Family / Patient: Updated  (daughter) at bedside. Time Spent for Care: This time was spent on one or more of the following: performing physical exam; counseling and coordination of care; obtaining or reviewing history; documenting in the medical record; reviewing/ordering tests, medications or procedures; communicating with other healthcare professionals and discussing with patient's family/caregivers. Current Length of Stay: 12 day(s)  Current Patient Status: Inpatient   Certification Statement: The patient will continue to require additional inpatient hospital stay due to Hypoxic respiratory failure  Discharge Plan: Anticipate discharge in 48-72 hrs to home with home services. Code Status: Level 1 - Full Code      Subjective:   Patient seen and examined. Increased lethargy today. Objective:   Vitals: Blood pressure 157/69, pulse 101, temperature 97.9 °F (36.6 °C), temperature source Temporal, resp. rate (!) 36, height 5' 4" (1.626 m), weight 56.2 kg (123 lb 14.4 oz), SpO2 95 %. Intake/Output Summary (Last 24 hours) at 8/22/2023 1313  Last data filed at 8/22/2023 1001  Gross per 24 hour   Intake 135 ml   Output 100 ml   Net 35 ml       Physical Exam  Vitals reviewed. Constitutional:       General: She is not in acute distress. Appearance: Normal appearance. She is ill-appearing. HENT:      Head: Atraumatic. Cardiovascular:      Rate and Rhythm: Regular rhythm. Heart sounds: Normal heart sounds. Pulmonary:      Breath sounds: Decreased breath sounds present. No wheezing. Abdominal:      General: Bowel sounds are normal.      Palpations: Abdomen is soft. Tenderness: There is no guarding or rebound. Musculoskeletal:         General: No swelling. Skin:     General: Skin is warm. Neurological:      Mental Status: She is disoriented. Psychiatric:         Mood and Affect: Mood normal.       Additional Data:   Labs:  Results from last 7 days   Lab Units 08/22/23  0523 08/21/23  0518 08/20/23  0545 08/18/23  0455 08/17/23  0446 08/16/23  0418   WBC Thousand/uL 10.96* 9.72 8.41   < > 7.66 7.66   HEMOGLOBIN g/dL 7.5* 7.6* 8.1*   < > 9.2* 9.3*   PLATELETS Thousands/uL 184 165 163   < > 224 230   MCV fL 106* 103* 104*   < > 106* 104*   INR   --   --   --   --  1.28* 1.48*    < > = values in this interval not displayed.      Results from last 7 days   Lab Units 08/22/23  0523 08/21/23  0518 08/20/23  0545   SODIUM mmol/L 138 138 137   POTASSIUM mmol/L 4.8 4.7 4.9   CHLORIDE mmol/L 104 105 105   CO2 mmol/L 30 30 28   ANION GAP mmol/L 4 3 4   BUN mg/dL 23 21 19   CREATININE mg/dL 0.71 0.67 0.82   CALCIUM mg/dL 8.5 8.1* 8.2*   ALBUMIN g/dL 2.3* 2.3* 2.4*   TOTAL BILIRUBIN mg/dL 0.53 0.49 0.56   ALK PHOS U/L 81 72 71   ALT U/L 37 16 14   AST U/L 103* 34 46*   EGFR ml/min/1.73sq m 87 89 73   GLUCOSE RANDOM mg/dL 116 103 117     Results from last 7 days   Lab Units 08/21/23  0518 08/20/23  0545 08/16/23  0418   MAGNESIUM mg/dL 1.7* 1.6* 2.0   PHOSPHORUS mg/dL 2.3 2.6 3.9                      Results from last 7 days   Lab Units 08/22/23  1228 08/22/23  0539 08/21/23  2335 08/21/23  1727 08/21/23  1050 08/21/23  0658 08/21/23  0609 08/21/23  0031 08/20/23  1724 08/20/23  1129 08/20/23  0620 08/20/23  0128   POC GLUCOSE mg/dl 95 112 127 122 105 117 106 131 111 109 120 114             * I Have Reviewed All Lab Data Listed Above. Cultures:                   Lines/Drains:  Invasive Devices     Peripheral Intravenous Line  Duration           Long-Dwell Peripheral IV (Midline) 08/18/23 Right Brachial 4 days          Drain  Duration           Gastrostomy/Enterostomy Percutaneous Endoscopic Gastrostomy (PEG) 20 Fr. Umbilicus 4 days              Telemetry:      Imaging:  Imaging Reports Reviewed Today Include:   XR chest portable    Result Date: 8/11/2023  Impression: Minimal bibasilar subsegmental atelectasis. Small left pleural effusion. Workstation performed: [id number]     CT chest abdomen pelvis wo contrast    Result Date: 8/10/2023  Impression: Images are motion affected; the right lower lobe main bronchus appears occluded (2:48). The right lower lung remains aerated. Bibasilar nodular infiltrates are noted. . Recommend pulmonary consultation and follow-up to resolution. Small to moderate left pleural effusion. There appears to be a split pleura sign however evaluation is limited due to the lack of IV contrast. Recommend clinical correlation. . The study was marked in EPIC for immediate notification. Workstation performed: [id number]     CT head without contrast    Result Date: 8/10/2023  Impression: Suboptimal positioning. No acute intracranial abnormality.  Workstation performed: [id number]       Scheduled Meds:  Current Facility-Administered Medications   Medication Dose Route Frequency Provider Last Rate   • acetaminophen  650 mg Per G Tube Q4H PRN Helen Brace, DO     • albuterol  2 puff Inhalation Q6H PRN Lianet Marroquin PA-C     • aluminum-magnesium hydroxide-simethicone  30 mL Oral Q4H PRN Fabiana Ricci PA-C     • apixaban  5 mg Per PEG Tube BID Deisi Kahn MD     • buPROPion  50 mg Per G Tube BID Helen Brace, DO     • DULoxetine  60 mg Per G Tube BID Helen Brace, DO     • gabapentin  300 mg Per G Tube BID Helen Brace, DO     • hydroxychloroquine  200 mg Per G Tube BID With Meals Helen Brace, DO     • lamoTRIgine  150 mg Per G Tube HS Madina Hussein DO     • metoprolol tartrate  12.5 mg Per G Tube Q12H [address] Madina Hussein DO     • morphine injection  2 mg Intravenous Q4H PRN Dannie Mcghee MD     • morphine injection  4 mg Intravenous Q4H PRN Dannie Mcghee MD     • omeprazole (PRILOSEC) suspension 2 mg/mL  20 mg Per G Tube Daily Madina Hussein DO     • ondansetron  4 mg Intravenous Q6H PRN Dick Gill PA-C         Today, Patient Was Seen By: Madina Hussein DO    ** Please Note: Dictation voice to text software may have been used in the creation of this document.  **

## 2023-08-22 NOTE — ASSESSMENT & PLAN NOTE
· Patient presented to the ED with altered mental status  · At the time of admission in the ED, her blood sugar was 458 was given 50 mL of D50.   · Poor oral intake  · Blood glucose level of 48 at 1220 on 8/11  · Now resolved  · Tube feeds started and now held    Plan:  · Blood glucose checks every 6 hours  · Hypoglycemia protocol

## 2023-08-22 NOTE — ASSESSMENT & PLAN NOTE
· Secondary to poor intake.   Resolved with initiation of tube feeds    Results from last 7 days   Lab Units 08/22/23  1228 08/22/23  0539 08/21/23  2335 08/21/23  1727   POC GLUCOSE mg/dl 95 112 127 122

## 2023-08-22 NOTE — ASSESSMENT & PLAN NOTE
· PEG tube placed this admission. Appreciate nutrition recommendations. · Discussed continuous vs bolus feeds.   Family wishes to continue continuous feeds due to history of GERD and reflux

## 2023-08-22 NOTE — ASSESSMENT & PLAN NOTE
-no ativan  -cont wellbutrin and duloxetine  -cont gabapentin and lamictal. Lower dose started 2/2 sedation and uptitrate as able.

## 2023-08-22 NOTE — ASSESSMENT & PLAN NOTE
· Patient was brought to the ED after being in altered mental status, requiring more oxygen. · History of multiple episodes of vomiting at night prior to admission  · At the time of presentation in the ED, patient's blood glucose 58  · Historically patient has poor appetite and barely eats. · High possibility of aspiration pneumonitis vs mechanical limitation due to LSO brace as the cause  · CT shows the right lower lobe main bronchus appears occluded (2:48). The right lower lung remains aerated. Bibasilar nodular infiltrates are noted  · Rapid Response called for intermittent hypoxia and wob  · At the time of examination, patient is alert, oriented, frail  · cxr shows increased b/l lower opacity    Plan:  Continue supplementary oxygen to maintain O2 sats above 90%  BIPAP at this time- wean as able.    If worsening resp status will require ett placement  Albuterol prn   Aspiration precautions  Lasix 20 given  Zosyn started- pt finished levoquin and has multiple allergies  Hold tube feeds for now  Transfer to SD1 for closer monitoring

## 2023-08-23 PROBLEM — E87.8 ELECTROLYTE IMBALANCE: Status: RESOLVED | Noted: 2023-08-11 | Resolved: 2023-08-23

## 2023-08-23 NOTE — ASSESSMENT & PLAN NOTE
· Patient was brought to the ED after being in altered mental status, requiring more oxygen. · History of multiple episodes of vomiting at night prior to admission  · At the time of presentation in the ED, patient's blood glucose 58  · Historically patient has poor appetite and barely eats. · High possibility of aspiration pneumonitis vs mechanical limitation due to LSO brace as the cause  · CT shows the right lower lobe main bronchus appears occluded (2:48). The right lower lung remains aerated. Bibasilar nodular infiltrates are noted  · Rapid Response called for intermittent hypoxia and wob  · At the time of examination, patient is alert, oriented, frail  · cxr shows increased b/l lower opacity    Plan:  Continue supplementary oxygen to maintain O2 sats above 90%  Patient on high flow oxygen, check frequently ABGs oxygen saturation  If worsening resp status will require ett placement  DUO neb q 6 hours   Aspiration precautions  Lasix 20 given - responded armen. Zosyn started- pt finished levoquin and has multiple allergies  Keep n.p.o. for today restart tube feeding in the a.m.   Patient does better with high flow oxygen then bipap

## 2023-08-23 NOTE — PROGRESS NOTES
[facility]  Progress Note  Name: Ced Meng  MRN: [id number]  Unit/Bed#: ICU 10 I Date of Admission: 8/10/2023   Date of Service: 8/23/2023 I Hospital Day: 13    Assessment/Plan   * Acute respiratory failure with hypoxia [facility])  Assessment & Plan  · Patient was brought to the ED after being in altered mental status, requiring more oxygen. · History of multiple episodes of vomiting at night prior to admission  · At the time of presentation in the ED, patient's blood glucose 58  · Historically patient has poor appetite and barely eats. · High possibility of aspiration pneumonitis vs mechanical limitation due to LSO brace as the cause  · CT shows the right lower lobe main bronchus appears occluded (2:48). The right lower lung remains aerated. Bibasilar nodular infiltrates are noted  · Rapid Response called for intermittent hypoxia and wob  · At the time of examination, patient is alert, oriented, frail  · cxr shows increased b/l lower opacity    Plan:  Continue supplementary oxygen to maintain O2 sats above 90%  Patient on high flow oxygen, check frequently ABGs oxygen saturation  If worsening resp status will require ett placement  DUO neb q 6 hours   Aspiration precautions  Lasix 20 given - responded armen. Zosyn started- pt finished levoquin and has multiple allergies  Keep n.p.o. for today restart tube feeding in the a.m. Patient does better with high flow oxygen then bipap                Dysphagia  Assessment & Plan  Most likely contributing to her respiratory status initially  -peg tube placed 8/17  -family previously requests continuous tube feeds. Will hold tonight 2/2 respiratory status. Will restart in am as able.   -cont protonix      HFrEF (heart failure with reduced ejection fraction) ([facility])  Assessment & Plan  Wt Readings from Last 3 Encounters:   08/22/23 56.2 kg (123 lb 14.4 oz)   07/29/23 49.4 kg (108 lb 14.4 oz)   06/30/23 55.3 kg (122 lb)     -lasix 20 given at rapid response  -Hold further diuresis  -monitor I/o's  -ef 30%/ mod AR  -cont BB for now. Hold if hypotension or bradycardia        Altered mental status  Assessment & Plan  · Patient was drowsy but responds appropriately at this time. · Restarted home meds and sleep deprivation could be contributing  · CT shows 8/10  No acute intracranial abnormality  · Patient is alert oriented to voice, moves all limbs, but has residual weakness from right MCA stroke in February 2023 at this time. Plan:  · Continue monitoring fingerstick blood glucose every 6 hours  · Hypoglycemia protocol  · Started on D5 NSS at 50 ml /hr until restarting tube feeding   · Frequent neurochecks    Hypoglycemia  Assessment & Plan  · Patient presented to the ED with altered mental status  · At the time of admission in the ED, her blood sugar was 458 was given 50 mL of D50.   · Poor oral intake  · Blood glucose level of 48 at 1220 on 8/11  · Now resolved  · Tube feeds started and now held    Plan:  · Blood glucose checks every 6 hours  · Hypoglycemia protocol    Closed T12 fracture (HCC)  Assessment & Plan  Patient has a history of low impact fall that resulted in multiple T-spine fractures  Currently pain controlled by morphine    Plan:  Morphine 7.5 mg bid prn  Continue LSO  Patient can be transferred to chair from bed with assistance    History of stroke  Assessment & Plan  · Patient had right MCA stroke, s/p mechanical thrombectomy earlier this year  · Although the patient moves all the limbs, there is residual weakness in the left upper and lower limb  · CT head on August 8 shows No acute intracranial abnormality    Plan:  · Continue Eliquis  · Frequent neurochecks      Hypercalcemia  Assessment & Plan  -likely 2/2 hyperparathyroidism  -follows with endocrine  -sensipar on hold 2/2 unable to crush    Rheumatoid arthritis of multiple sites with negative rheumatoid factor ([address])  Assessment & Plan  · Patient has a history of seronegative multiple joint arthropathy  · Currently on hydroxychloroquine 200 mg twice daily  · Patient follows up with rheumatologist    Plan:  · Continue hydroxychloroquine as per home regimen    Bipolar depression ([address])  Assessment & Plan  -no ativan  -cont wellbutrin and duloxetine  -cont gabapentin and lamictal. Lower dose started 2/2 sedation and uptitrate as able. Other specified anemias  Assessment & Plan  -hgb trending down  -multiple healing hematomas  -no obvious bleeding at this time  -monitor hgb and transfuse if <7     Electrolyte imbalance-resolved as of 8/23/2023  Assessment & Plan  · BMP on 8/11 shows K of 3.1  · Hypomagnesimia with Mg of 1.4  · Most likely due to poor oral intake  · Subjectively, the patient feels fine and at baseline    Plan:  · Potassium chloride 40mEq IVPB  · Magnesium sulfate 2 g IVPB once  · Recheck magnesium in a.m. draw  · Monitor BMP  q12hr            ICU Core Measures     A: Assess, Prevent, and Manage Pain · Has pain been assessed? Yes  · Need for changes to pain regimen? No   B: Both SAT/SAT  · N/A   C: Choice of Sedation · RASS Goal: 0 Alert and Calm or N/A patient not on sedation  · Need for changes to sedation or analgesia regimen? NA   D: Delirium · CAM-ICU: pt follows commands and say one word at the time    E: Early Mobility  · Plan for early mobility? Yes   F: Family Engagement · Plan for family engagement today? Yes       Antibiotic Review: Awaiting culture results.      Review of Invasive Devices:            Prophylaxis:  VTE VTE covered by:  apixaban, Per PEG Tube, 5 mg at 08/22/23 2021       Stress Ulcer  covered byomeprazole (PRILOSEC) suspension 2 mg/mL [[id number]]       Subjective   HPI/24hr events:   Patient tolerated high flow better than BiPAP  ABG in the a.m.  Mild respiratory alkalosis  Pt responds to voice and follows commands     Unable to perform      Objective                            Vitals I/O      Most Recent Min/Max in 24hrs   Temp (!) 96.8 °F (36 °C) Temp Min: 96.8 °F (36 °C)  Max: 99.2 °F (37.3 °C)   Pulse 96 Pulse  Min: 90  Max: 101   Resp (!) 30 Resp  Min: 21  Max: 36   BP 90/51 BP  Min: 90/51  Max: 157/69   O2 Sat 95 % SpO2  Min: 83 %  Max: 100 %      Intake/Output Summary (Last 24 hours) at 8/23/2023 0500  Last data filed at 8/22/2023 2115  Gross per 24 hour   Intake 265 ml   Output --   Net 265 ml         No diet orders on file     Invasive Monitoring Physical exam    Physical Exam  Skin:     General: Skin is cool. HENT:      Head: Normocephalic and atraumatic. Cardiovascular:      Rate and Rhythm: Normal rate and regular rhythm. Pulses: Decreased pulses. Heart sounds: Murmur heard. Musculoskeletal:         General: Normal range of motion. Abdominal:      General: Bowel sounds are normal.      Palpations: Abdomen is soft. Constitutional:       Appearance: She is underweight. She is ill-appearing and toxic-appearing. Comments: Severely deconditioned, elderly female   Pulmonary:      Effort: Tachypnea present. Breath sounds: Examination of the right-middle field reveals decreased breath sounds. Examination of the left-middle field reveals decreased breath sounds. Examination of the right-lower field reveals decreased breath sounds. Examination of the left-lower field reveals decreased breath sounds. Decreased breath sounds present. Neurological:      Mental Status: She is alert and easily aroused. Vitals and nursing note reviewed. Diagnostic Studies      EKG: a fib  Imaging:  I have personally reviewed pertinent reports.        Medications:  Scheduled PRN   acetylcysteine, 3 mL, Q8H  apixaban, 5 mg, BID  buPROPion, 50 mg, BID  DULoxetine, 60 mg, BID  gabapentin, 100 mg, BID  hydroxychloroquine, 200 mg, BID With Meals  ipratropium-albuterol, 3 mL, Q6H  lamoTRIgine, 50 mg, HS  lidocaine, 1 patch, Daily  metoprolol tartrate, 12.5 mg, Q12H KHARI  omeprazole (PRILOSEC) suspension 2 mg/mL, 20 mg, Daily  piperacillin-tazobactam, 3.375 g, Q6H      acetaminophen, 650 mg, Q4H PRN  albuterol, 2 puff, Q6H PRN  aluminum-magnesium hydroxide-simethicone, 30 mL, Q4H PRN  morphine, 7.5 mg, Q6H PRN  ondansetron, 4 mg, Q6H PRN       Continuous    dextrose 5 % and sodium chloride 0.9 %, 50 mL/hr, Last Rate: 50 mL/hr (08/23/23 0312)         Labs:    CBC    Recent Labs     08/21/23 0518 08/22/23 0523 08/22/23 2120   WBC 9.72 10.96*  --    HGB 7.6* 7.5* 7.8*   HCT 24.6* 24.9* 23*    184  --      BMP    Recent Labs     08/21/23 0518 08/22/23 0523 08/22/23 2120   SODIUM 138 138  --    K 4.7 4.8  --     104  --    CO2 30 30 31   AGAP 3 4  --    BUN 21 23  --    CREATININE 0.67 0.71  --    CALCIUM 8.1* 8.5  --        Coags    No recent results     Additional Electrolytes  Recent Labs     08/21/23 0518 08/22/23 2120   MG 1.7*  --    PHOS 2.3  --    CAIONIZED  --  1.20          Blood Gas    Recent Labs     08/22/23  2315   PHART 7.500*   SHD0CVV 34.3*   PO2ART 496.0*   GVN0KSP 26.1   BEART 3.1   SOURCE Brachial, Left     Recent Labs     08/22/23 2031 08/22/23 2315   PHVEN 7.425*  --    FHQ6EEC 46.5  --    PO2VEN 15.3*  --    QXX8PBX 29.8  --    BEVEN 4.9  --    SOURCE  --  Brachial, Left    LFTs  Recent Labs     08/21/23 0518 08/22/23 0523   ALT 16 37   AST 34 103*   ALKPHOS 72 81   ALB 2.3* 2.3*   TBILI 0.49 0.53       Infectious  No recent results  Glucose  Recent Labs     08/21/23 0518 08/22/23 0523 08/22/23  1814   GLUC 103 116 73                   Nasima Elias PA-C

## 2023-08-23 NOTE — ASSESSMENT & PLAN NOTE
· Patient was drowsy but responds appropriately at this time. · Restarted home meds and sleep deprivation could be contributing  · CT shows 8/10  No acute intracranial abnormality  · Patient is alert oriented to voice, moves all limbs, but has residual weakness from right MCA stroke in February 2023 at this time.   Plan:  · Continue monitoring fingerstick blood glucose every 6 hours  · Hypoglycemia protocol  · Started on D5 NSS at 50 ml /hr until restarting tube feeding   · Frequent neurochecks

## 2023-08-23 NOTE — ASSESSMENT & PLAN NOTE
Wt Readings from Last 3 Encounters:   08/22/23 56.2 kg (123 lb 14.4 oz)   07/29/23 49.4 kg (108 lb 14.4 oz)   06/30/23 55.3 kg (122 lb)     -lasix 20 given at rapid response  -Hold further diuresis  -monitor I/o's  -ef 30%/ mod AR  -cont BB for now.  Hold if hypotension or bradycardia

## 2023-08-23 NOTE — PLAN OF CARE
Problem: Potential for Falls  Goal: Patient will remain free of falls  Description: INTERVENTIONS:  - Educate patient/family on patient safety including physical limitations  - Instruct patient to call for assistance with activity   - Consult OT/PT to assist with strengthening/mobility   - Keep Call bell within reach  - Keep bed low and locked with side rails adjusted as appropriate  - Keep care items and personal belongings within reach  - Initiate and maintain comfort rounds  - Make Fall Risk Sign visible to staff  - Offer Toileting every 2 Hours, in advance of need  - Initiate/Maintain bed/chair alarm  - Obtain necessary fall risk management equipment: bed/chair alarm, call bell, gripper socks, walker, bedside commode  - Apply yellow socks and bracelet for high fall risk patients  - Consider moving patient to room near nurses station  Outcome: Not Progressing     Problem: MOBILITY - ADULT  Goal: Maintain or return to baseline ADL function  Description: INTERVENTIONS:  - Educate patient/family on patient safety including physical limitations  - Instruct patient to call for assistance with activity   - Consult OT/PT to assist with strengthening/mobility   - Keep Call bell within reach  - Keep bed low and locked with side rails adjusted as appropriate  - Keep care items and personal belongings within reach  - Initiate and maintain comfort rounds  - Make Fall Risk Sign visible to staff  - Offer Toileting every 2 Hours, in advance of need  - Initiate/Maintain bed/chair alarm  - Obtain necessary fall risk management equipment: bed/chair alarm, call bell, gripper socks, walker, bedside commode  - Apply yellow socks and bracelet for high fall risk patients  - Consider moving patient to room near nurses station  Outcome: Not Progressing  Goal: Maintains/Returns to pre admission functional level  Description: INTERVENTIONS:  - Perform BMAT or MOVE assessment daily.   - Set and communicate daily mobility goal to care team and patient. - Collaborate with rehabilitation services on mobility goals if consulted  - Reposition patient every 2 hours.   - Dangle patient 3 times a day  - Stand patient 3 times a day  - Ambulate patient 3 times a day  - Out of bed to chair 3 times a day   - Out of bed for meals  - Out of bed for toileting  - Record patient progress and toleration of activity level   Outcome: Not Progressing     Problem: PAIN - ADULT  Goal: Verbalizes/displays adequate comfort level or baseline comfort level  Description: Interventions:  - Encourage patient to monitor pain and request assistance  - Assess pain using appropriate pain scale  - Administer analgesics based on type and severity of pain and evaluate response  - Implement non-pharmacological measures as appropriate and evaluate response  - Consider cultural and social influences on pain and pain management  - Notify physician/advanced practitioner if interventions unsuccessful or patient reports new pain  Outcome: Not Progressing     Problem: INFECTION - ADULT  Goal: Absence or prevention of progression during hospitalization  Description: INTERVENTIONS:  - Assess and monitor for signs and symptoms of infection  - Monitor lab/diagnostic results  - Monitor all insertion sites, i.e. indwelling lines, tubes, and drains  - Monitor endotracheal if appropriate and nasal secretions for changes in amount and color  - Cutler appropriate cooling/warming therapies per order  - Administer medications as ordered  - Instruct and encourage patient and family to use good hand hygiene technique  - Identify and instruct in appropriate isolation precautions (contact isolation for CRE) for identified infection/condition  Outcome: Not Progressing     Problem: SAFETY ADULT  Goal: Patient will remain free of falls  Description: INTERVENTIONS:  - Educate patient/family on patient safety including physical limitations  - Instruct patient to call for assistance with activity   - Consult OT/PT to assist with strengthening/mobility   - Keep Call bell within reach  - Keep bed low and locked with side rails adjusted as appropriate  - Keep care items and personal belongings within reach  - Initiate and maintain comfort rounds  - Make Fall Risk Sign visible to staff  - Offer Toileting every 2 Hours, in advance of need  - Initiate/Maintain bed/chair alarm  - Obtain necessary fall risk management equipment: bed/chair alarm, call bell, gripper socks, walker, bedside commode  - Apply yellow socks and bracelet for high fall risk patients  - Consider moving patient to room near nurses station  Outcome: Not Progressing  Goal: Maintain or return to baseline ADL function  Description: INTERVENTIONS:  - Educate patient/family on patient safety including physical limitations  - Instruct patient to call for assistance with activity   - Consult OT/PT to assist with strengthening/mobility   - Keep Call bell within reach  - Keep bed low and locked with side rails adjusted as appropriate  - Keep care items and personal belongings within reach  - Initiate and maintain comfort rounds  - Make Fall Risk Sign visible to staff  - Offer Toileting every 2 Hours, in advance of need  - Initiate/Maintain bed/chair alarm  - Obtain necessary fall risk management equipment: bed/chair alarm, call bell, gripper socks, walker, bedside commode  - Apply yellow socks and bracelet for high fall risk patients  - Consider moving patient to room near nurses station  Outcome: Not Progressing  Goal: Maintains/Returns to pre admission functional level  Description: INTERVENTIONS:  - Perform BMAT or MOVE assessment daily.   - Set and communicate daily mobility goal to care team and patient. - Collaborate with rehabilitation services on mobility goals if consulted  - Reposition patient every 2 hours.   - Dangle patient 3 times a day  - Stand patient 3 times a day  - Ambulate patient 3 times a day  - Out of bed to chair 3 times a day   - Out of bed for meals  - Out of bed for toileting  - Record patient progress and toleration of activity level   Outcome: Not Progressing     Problem: Prexisting or High Potential for Compromised Skin Integrity  Goal: Skin integrity is maintained or improved  Description: INTERVENTIONS:  - Identify patients at risk for skin breakdown  - Assess and monitor skin integrity  - Assess and monitor nutrition and hydration status  - Monitor labs   - Assess for incontinence   - Turn and reposition patient  - Assist with mobility/ambulation  - Relieve pressure over bony prominences  - Avoid friction and shearing  - Provide appropriate hygiene as needed including keeping skin clean and dry  - Evaluate need for skin moisturizer/barrier cream  - Collaborate with interdisciplinary team   - Patient/family teaching  - Consider wound care consult   Outcome: Not Progressing     Problem: Nutrition/Hydration-ADULT  Goal: Nutrient/Hydration intake appropriate for improving, restoring or maintaining nutritional needs  Description: Monitor and assess patient's nutrition/hydration status for malnutrition. Collaborate with interdisciplinary team and initiate plan and interventions as ordered. Monitor patient's weight and dietary intake as ordered or per policy. Utilize nutrition screening tool and intervene as necessary. Determine patient's food preferences and provide high-protein, high-caloric foods as appropriate.      INTERVENTIONS:  - Monitor oral intake, urinary output, labs, and treatment plans  - Assess nutrition and hydration status and recommend course of action  - Evaluate amount of meals eaten  - Do not leave patient alone while eating due to aspiration risk  - Assist patient with eating if necessary   - Allow adequate time for meals  - Recommend/ encourage appropriate diets, oral nutritional supplements, and vitamin/mineral supplements  - Order, calculate, and assess calorie counts as needed  - Assess need for intravenous fluids  - Provide specific nutrition/hydration education as appropriate  - Include patient in decisions related to nutrition  Outcome: Not Progressing     Problem: RESPIRATORY - ADULT  Goal: Achieves optimal ventilation and oxygenation  Description: INTERVENTIONS:  - Assess for changes in respiratory status  - Assess for changes in mentation and behavior  - Position to facilitate oxygenation and minimize respiratory effort  - Oxygen administered by appropriate delivery if ordered   - Encourage broncho-pulmonary hygiene including cough, deep breathe, Incentive Spirometry, flutter valve  - Assess the need for suctioning and aspirate as needed  - Assess and instruct to report SOB or any respiratory difficulty  - Respiratory Therapy support as indicated  Outcome: Not Progressing

## 2023-08-24 NOTE — ASSESSMENT & PLAN NOTE
-hgb trending down- today 6.9  -need to rule out retroperitoneal bleeding given back pain and hx of anticoagulation  -will transfuse 1 unit now and trend hgb q 12

## 2023-08-24 NOTE — ASSESSMENT & PLAN NOTE
Wt Readings from Last 3 Encounters:   08/24/23 57.1 kg (125 lb 14.1 oz)   07/29/23 49.4 kg (108 lb 14.4 oz)   06/30/23 55.3 kg (122 lb)     -lasix 20 given at rapid response  -Hold further diuresis  -monitor I/o's  -ef 30%/ mod AR  -cont BB for now.  Hold if hypotension or bradycardia

## 2023-08-24 NOTE — OCCUPATIONAL THERAPY NOTE
Occupational Therapy Progress Note     Patient Name: Ace CAREY Date: 8/24/2023  Problem List  Principal Problem:    Acute respiratory failure with hypoxia [facility])  Active Problems:    Other specified anemias    Bipolar depression ([facility])    Rheumatoid arthritis of multiple sites with negative rheumatoid factor ([facility])    HFrEF (heart failure with reduced ejection fraction) ([facility])    Hypercalcemia    History of stroke    Altered mental status    Dysphagia    Closed T12 fracture ([facility])    Hypoglycemia            08/24/23 0837   Note Type   Note Type Treatment   Pain Assessment   Pain Assessment Tool FLACC   Pain Rating: FLACC (Rest) - Face 0   Pain Rating: FLACC (Rest) - Legs 0   Pain Rating: FLACC (Rest) - Activity 0   Pain Rating: FLACC (Rest) - Cry 1   Pain Rating: FLACC (Rest) - Consolability 0   Score: FLACC (Rest) 1   Restrictions/Precautions   Weight Bearing Precautions Per Order No   Braces or Orthoses LSO   Other Precautions Cognitive; Chair Alarm; Bed Alarm; Fall Risk;O2;Telemetry;Multiple lines;Spinal precautions;Contact/isolation;Aspiration   ADL   Where Assessed Edge of bed   Eating Assistance 5  Supervision/Setup   Grooming Assistance 5  Supervision/Setup   UB Bathing Assistance 4  Minimal Assistance   LB Bathing Assistance 2  Maximal Assistance   UB Dressing Assistance 4  Minimal Assistance   LB Dressing Assistance 2  Maximal [address] [place]  1  Total Assistance   Functional Standing Tolerance   Time 20-30secs   Bed Mobility   Rolling R 2  Maximal assistance   Additional items Assist x 1; Increased time required;Verbal cues;LE management   Rolling L 2  Maximal assistance   Additional items Assist x 1; Increased time required;Verbal cues;LE management   Supine to Sit 2  Maximal assistance   Additional items Assist x 1; Increased time required;Verbal cues;LE management   Sit to Supine 2  Maximal assistance   Additional items Assist x 1; Increased time required;Verbal cues;LE management   Transfers   Sit to Stand 3  Moderate assistance   Additional items Assist x 1; Increased time required;Verbal cues   Stand to Sit 4  Minimal assistance   Additional items Assist x 1; Increased time required;Verbal cues   Functional Mobility   Functional Mobility 3  Moderate assistance   Additional Comments x1   Additional items Hand hold assistance   Therapeutic Excerise-Strength   UE Strength Yes  (b/l UE AROM exercises(shr flex/ext/abd/add)1 set, 10reps)   Subjective   Subjective "Can I have some ice chips?"   Cognition   Overall Cognitive Status Impaired   Arousal/Participation Alert   Attention Attends with cues to redirect   Orientation Level Disoriented X4   Memory Decreased short term memory;Decreased recall of precautions   Following Commands Follows one step commands with increased time or repetition   Activity Tolerance   Activity Tolerance Patient limited by fatigue   Medical Staff Made Aware nsg   Assessment   Assessment Pt seen for 35min tx session with focus on functional mobility, functional balance, ADL status, transfer safety, b/l UE exercises, and cognition. Pt transferred to ICU yesterday 2* respiratory distress. Pt currently on 6 liters of O2 and allowed activities as tolerated per critical care team(i.e.Peyton-[email or web address]). Pt able to tolerate OOB mobility; sitting balance=f/f-, standing balance=p+/p. Pt demonstrating need for heavy assistance with her ADLs, functional mobility, and to maintain functional balance. Reviewed proper  techniques with limited carryover noted. Cognitive deficits noted--problem-solving, judgement/safety. Pt continues to demonstrate appropriateness for inpt rehab to improve her overall level of independence. Will continue. The patient's raw score on the AM-PAC Daily Activity Inpatient Short Form is 14. A raw score of less than 19 suggests the patient may benefit from discharge to post-acute rehabilitation services.  Please refer to the recommendation of the Occupational Therapist for safe discharge planning. Plan   Treatment Interventions ADL retraining;Functional transfer training;UE strengthening/ROM; Endurance training;Cognitive reorientation;Patient/family training;Equipment evaluation/education; Compensatory technique education   Goal Expiration Date 08/30/23   OT Treatment Day 1   Recommendation   OT Discharge Recommendation Post acute rehabilitation services   AM-PAC Daily Activity Inpatient   Lower Body Dressing 2   Bathing 2   Toileting 2   Upper Body Dressing 2   Grooming 3   Eating 3   Daily Activity Raw Score 14   Daily Activity Standardized Score (Calc for Raw Score >=11) 33.39   AM-PAC Applied Cognition Inpatient   Following a Speech/Presentation 3   Understanding Ordinary Conversation 3   Taking Medications 2   Remembering Where Things Are Placed or Put Away 2   Remembering List of 4-5 Errands 2   Taking Care of Complicated Tasks 2   Applied Cognition Raw Score 14   Applied Cognition Standardized Score 32.02   [place]

## 2023-08-24 NOTE — PLAN OF CARE
Problem: Potential for Falls  Goal: Patient will remain free of falls  Description: INTERVENTIONS:  - Educate patient/family on patient safety including physical limitations  - Instruct patient to call for assistance with activity   - Consult OT/PT to assist with strengthening/mobility   - Keep Call bell within reach  - Keep bed low and locked with side rails adjusted as appropriate  - Keep care items and personal belongings within reach  - Initiate and maintain comfort rounds  - Make Fall Risk Sign visible to staff  - Offer Toileting every 2 Hours, in advance of need  - Initiate/Maintain bed/chair alarm  - Obtain necessary fall risk management equipment: bed/chair alarm, call bell, gripper socks, walker, bedside commode  - Apply yellow socks and bracelet for high fall risk patients  - Consider moving patient to room near nurses station  Outcome: Not Progressing     Problem: MOBILITY - ADULT  Goal: Maintain or return to baseline ADL function  Description: INTERVENTIONS:  - Educate patient/family on patient safety including physical limitations  - Instruct patient to call for assistance with activity   - Consult OT/PT to assist with strengthening/mobility   - Keep Call bell within reach  - Keep bed low and locked with side rails adjusted as appropriate  - Keep care items and personal belongings within reach  - Initiate and maintain comfort rounds  - Make Fall Risk Sign visible to staff  - Offer Toileting every 2 Hours, in advance of need  - Initiate/Maintain bed/chair alarm  - Obtain necessary fall risk management equipment: bed/chair alarm, call bell, gripper socks, walker, bedside commode  - Apply yellow socks and bracelet for high fall risk patients  - Consider moving patient to room near nurses station  Outcome: Not Progressing  Goal: Maintains/Returns to pre admission functional level  Description: INTERVENTIONS:  - Perform BMAT or MOVE assessment daily.   - Set and communicate daily mobility goal to care team and patient. - Collaborate with rehabilitation services on mobility goals if consulted  - Reposition patient every 2 hours.   - Dangle patient 3 times a day  - Stand patient 3 times a day  - Ambulate patient 3 times a day  - Out of bed to chair 3 times a day   - Out of bed for meals  - Out of bed for toileting  - Record patient progress and toleration of activity level   Outcome: Not Progressing     Problem: PAIN - ADULT  Goal: Verbalizes/displays adequate comfort level or baseline comfort level  Description: Interventions:  - Encourage patient to monitor pain and request assistance  - Assess pain using appropriate pain scale  - Administer analgesics based on type and severity of pain and evaluate response  - Implement non-pharmacological measures as appropriate and evaluate response  - Consider cultural and social influences on pain and pain management  - Notify physician/advanced practitioner if interventions unsuccessful or patient reports new pain  Outcome: Progressing     Problem: SAFETY ADULT  Goal: Patient will remain free of falls  Description: INTERVENTIONS:  - Educate patient/family on patient safety including physical limitations  - Instruct patient to call for assistance with activity   - Consult OT/PT to assist with strengthening/mobility   - Keep Call bell within reach  - Keep bed low and locked with side rails adjusted as appropriate  - Keep care items and personal belongings within reach  - Initiate and maintain comfort rounds  - Make Fall Risk Sign visible to staff  - Offer Toileting every 2 Hours, in advance of need  - Initiate/Maintain bed/chair alarm  - Obtain necessary fall risk management equipment: bed/chair alarm, call bell, gripper socks, walker, bedside commode  - Apply yellow socks and bracelet for high fall risk patients  - Consider moving patient to room near nurses station  Outcome: Not Progressing  Goal: Maintain or return to baseline ADL function  Description: INTERVENTIONS:  - Educate patient/family on patient safety including physical limitations  - Instruct patient to call for assistance with activity   - Consult OT/PT to assist with strengthening/mobility   - Keep Call bell within reach  - Keep bed low and locked with side rails adjusted as appropriate  - Keep care items and personal belongings within reach  - Initiate and maintain comfort rounds  - Make Fall Risk Sign visible to staff  - Offer Toileting every 2 Hours, in advance of need  - Initiate/Maintain bed/chair alarm  - Obtain necessary fall risk management equipment: bed/chair alarm, call bell, gripper socks, walker, bedside commode  - Apply yellow socks and bracelet for high fall risk patients  - Consider moving patient to room near nurses station  Outcome: Not Progressing  Goal: Maintains/Returns to pre admission functional level  Description: INTERVENTIONS:  - Perform BMAT or MOVE assessment daily.   - Set and communicate daily mobility goal to care team and patient. - Collaborate with rehabilitation services on mobility goals if consulted  - Reposition patient every 2 hours.   - Dangle patient 3 times a day  - Stand patient 3 times a day  - Ambulate patient 3 times a day  - Out of bed to chair 3 times a day   - Out of bed for meals  - Out of bed for toileting  - Record patient progress and toleration of activity level   Outcome: Not Progressing     Problem: Prexisting or High Potential for Compromised Skin Integrity  Goal: Skin integrity is maintained or improved  Description: INTERVENTIONS:  - Identify patients at risk for skin breakdown  - Assess and monitor skin integrity  - Assess and monitor nutrition and hydration status  - Monitor labs   - Assess for incontinence   - Turn and reposition patient  - Assist with mobility/ambulation  - Relieve pressure over bony prominences  - Avoid friction and shearing  - Provide appropriate hygiene as needed including keeping skin clean and dry  - Evaluate need for skin moisturizer/barrier cream  - Collaborate with interdisciplinary team   - Patient/family teaching  - Consider wound care consult   Outcome: Not Progressing     Problem: Nutrition/Hydration-ADULT  Goal: Nutrient/Hydration intake appropriate for improving, restoring or maintaining nutritional needs  Description: Monitor and assess patient's nutrition/hydration status for malnutrition. Collaborate with interdisciplinary team and initiate plan and interventions as ordered. Monitor patient's weight and dietary intake as ordered or per policy. Utilize nutrition screening tool and intervene as necessary. Determine patient's food preferences and provide high-protein, high-caloric foods as appropriate.      INTERVENTIONS:  - Monitor oral intake, urinary output, labs, and treatment plans  - Assess nutrition and hydration status and recommend course of action  - Evaluate amount of meals eaten  - Do not leave patient alone while eating due to aspiration risk  - Assist patient with eating if necessary   - Allow adequate time for meals  - Recommend/ encourage appropriate diets, oral nutritional supplements, and vitamin/mineral supplements  - Order, calculate, and assess calorie counts as needed  - Assess need for intravenous fluids  - Provide specific nutrition/hydration education as appropriate  - Include patient in decisions related to nutrition  Outcome: Not Progressing     Problem: RESPIRATORY - ADULT  Goal: Achieves optimal ventilation and oxygenation  Description: INTERVENTIONS:  - Assess for changes in respiratory status  - Assess for changes in mentation and behavior  - Position to facilitate oxygenation and minimize respiratory effort  - Oxygen administered by appropriate delivery if ordered   - Encourage broncho-pulmonary hygiene including cough, deep breathe, Incentive Spirometry, flutter valve  - Assess the need for suctioning and aspirate as needed  - Assess and instruct to report SOB or any respiratory difficulty  - Respiratory Therapy support as indicated  Outcome: Not Progressing

## 2023-08-24 NOTE — PROGRESS NOTES
[facility]  Progress Note  Name: Faith Zarate  MRN: [id number]  Unit/Bed#: ICU 10 I Date of Admission: 8/10/2023   Date of Service: 8/24/2023 I Hospital Day: 14    Assessment/Plan   * Acute respiratory failure with hypoxia [facility])  Assessment & Plan  · Patient was brought to the ED after being in altered mental status, requiring more oxygen. · History of multiple episodes of vomiting at night prior to admission  · At the time of presentation in the ED, patient's blood glucose 58  · Historically patient has poor appetite and barely eats. · High possibility of aspiration pneumonitis vs mechanical limitation due to LSO brace as the cause  · CT shows the right lower lobe main bronchus appears occluded (2:48). The right lower lung remains aerated. Bibasilar nodular infiltrates are noted  · Rapid Response called for intermittent hypoxia and wob  · At the time of examination, patient is alert, oriented, frail  · cxr shows increased b/l lower opacity    Plan:  · Continue supplementary oxygen to maintain O2 sats above 90%  · Patient was on high flow oxygen, now on midflow  · Our goal is to maintain her oxygen saturation > 90%  · If worsening resp status will require ett placement  · DUO neb q 6 hours   · Aspiration precautions  · Zosyn started- pt finished levoquin and has multiple allergies  · Keep n.p.o. for today restart tube feeding in the a.m. Dysphagia  Assessment & Plan  Most likely contributing to her respiratory status initially  -peg tube placed 8/17  -family previously requests continuous tube feeds. HFrEF (heart failure with reduced ejection fraction) (HCC)  Assessment & Plan  Wt Readings from Last 3 Encounters:   08/24/23 57.1 kg (125 lb 14.1 oz)   07/29/23 49.4 kg (108 lb 14.4 oz)   06/30/23 55.3 kg (122 lb)     -lasix 20 given at rapid response  -Hold further diuresis  -monitor I/o's  -ef 30%/ mod AR  -cont BB for now.  Hold if hypotension or bradycardia        Altered mental status  Assessment & Plan  · Patient was drowsy but responds appropriately at this time. · Restarted home meds and sleep deprivation could be contributing  · CT shows 8/10  No acute intracranial abnormality  · Patient is alert oriented to voice, moves all limbs, but has residual weakness from right MCA stroke in February 2023 at this time. Plan:  · Continue monitoring fingerstick blood glucose every 6 hours  · Hypoglycemia protocol  · Started on D5 NSS at 50 ml /hr until restarting tube feeding   · Frequent neurochecks    Hypoglycemia  Assessment & Plan  · Patient presented to the ED with altered mental status  · At the time of admission in the ED, her blood sugar was 458 was given 50 mL of D50.   · Poor oral intake  · Blood glucose level of 48 at 1220 on 8/11  · Now resolved  · Tube feeds started and now held    Plan:  · Blood glucose checks every 6 hours  · Hypoglycemia protocol    Closed T12 fracture (HCC)  Assessment & Plan  Patient has a history of low impact fall that resulted in multiple T-spine fractures  Currently pain controlled by morphine    Plan:  · Morphine 7.5 mg bid prn  · Continue LSO  · Patient can be transferred to chair from bed with assistance    History of stroke  Assessment & Plan  · Patient had right MCA stroke, s/p mechanical thrombectomy earlier this year  · Although the patient moves all the limbs, there is residual weakness in the left upper and lower limb  · CT head on August 8 shows No acute intracranial abnormality    Plan:  · Hold Eliquis for now given dropping hgb  · Frequent neurochecks      Hypercalcemia  Assessment & Plan  -likely 2/2 hyperparathyroidism  -follows with endocrine  -sensipar on hold 2/2 unable to crush    Rheumatoid arthritis of multiple sites with negative rheumatoid factor ([address])  Assessment & Plan  · Patient has a history of seronegative multiple joint arthropathy  · Currently on hydroxychloroquine 200 mg twice daily  · Patient follows up with rheumatologist    Plan:  · Continue hydroxychloroquine as per home regimen    Bipolar depression ([address])  Assessment & Plan  -no ativan  -cont wellbutrin and duloxetine  -cont gabapentin and lamictal. Lower dose started 2/2 sedation and uptitrate as able. Other specified anemias  Assessment & Plan  -hgb trending down- today 6.9  -need to rule out retroperitoneal bleeding given back pain and hx of anticoagulation  -will transfuse 1 unit now and trend hgb q 12             ICU Core Measures     A: Assess, Prevent, and Manage Pain · Has pain been assessed? Yes  · Need for changes to pain regimen? No   B: Both SAT/SAT  · N/A   C: Choice of Sedation · RASS Goal: 0 Alert and Calm or N/A patient not on sedation  · Need for changes to sedation or analgesia regimen? NA   D: Delirium · CAM-ICU: Negative   E: Early Mobility  · Plan for early mobility? Yes   F: Family Engagement · Plan for family engagement today? Yes       Antibiotic Review: Patient on appropriate coverage based on culture data. and Continue broad spectrum secondary to severity of illness. Review of Invasive Devices:          Prophylaxis:  VTE VTE covered by:  apixaban, Per PEG Tube, 5 mg at 08/23/23 1720       Stress Ulcer  covered byomeprazole (PRILOSEC) suspension 2 mg/mL [[id number]]     Subjective     HPI/24hr events: c/o back pain, hgb down today to 6.9. no evidence of GI bleeding    Review of Systems   All other systems reviewed and are negative.        Objective                            Vitals I/O      Most Recent Min/Max in 24hrs   Temp (!) 96.5 °F (35.8 °C) Temp  Min: 96.5 °F (35.8 °C)  Max: 97.6 °F (36.4 °C)   Pulse 98 Pulse  Min: 88  Max: 104   Resp (!) 29 Resp  Min: 24  Max: 33   BP 96/62 BP  Min: 84/63  Max: 115/64   O2 Sat 97 % SpO2  Min: 93 %  Max: 100 %      Intake/Output Summary (Last 24 hours) at 8/24/2023 0913  Last data filed at 8/24/2023 0800  Gross per 24 hour   Intake 1450 ml   Output 200 ml   Net 1250 ml No diet orders on file     Invasive Monitoring Physical exam    Physical Exam  Eyes:      Pupils: Pupils are equal, round, and reactive to light. Skin:     General: Skin is warm and dry. HENT:      Head: Normocephalic. Mouth/Throat:      Mouth: Mucous membranes are dry. Comments: Tongue does not appear to be swollen  Cardiovascular:      Rate and Rhythm: Normal rate. Abdominal:      Palpations: Abdomen is soft. Tenderness: There is no abdominal tenderness. Constitutional:       Appearance: She is well-developed. Pulmonary:      Effort: Pulmonary effort is normal.      Breath sounds: Normal breath sounds. No stridor. Comments: Positive air leak  Neurological:      General: No focal deficit present. Mental Status: She is alert. Motor: gross motor function is at baseline for patient. Diagnostic Studies    EKG:   Imaging:  I have personally reviewed pertinent reports.    and I have personally reviewed pertinent films in PACS     Medications:  Scheduled PRN   acetaminophen, 975 mg, Q8H  acetylcysteine, 3 mL, Q8H  apixaban, 5 mg, BID  buPROPion, 50 mg, BID  DULoxetine, 60 mg, BID  hydroxychloroquine, 200 mg, BID With Meals  ipratropium-albuterol, 3 mL, Q6H  lamoTRIgine, 50 mg, HS  lidocaine, 1 patch, Daily  metoprolol tartrate, 12.5 mg, Q12H KHARI  omeprazole (PRILOSEC) suspension 2 mg/mL, 20 mg, Daily  piperacillin-tazobactam, 3.375 g, Q6H      albuterol, 2 puff, Q6H PRN  aluminum-magnesium hydroxide-simethicone, 30 mL, Q4H PRN  HYDROmorphone, 0.2 mg, Q4H PRN  ondansetron, 4 mg, Q6H PRN       Continuous    dextrose 5 % and sodium chloride 0.9 %, 50 mL/hr, Last Rate: 50 mL/hr (08/23/23 2247)         Labs:    CBC    Recent Labs     08/23/23 0455 08/24/23  0514   WBC 14.82* 12.28*   HGB 7.4* 6.9*   HCT 25.0* 23.4*    188   BANDSPCT  --  3     BMP    Recent Labs     08/23/23 0455 08/24/23  0514   SODIUM 140 144   K 4.0 3.5    108   CO2 26 27   AGAP 12 9 BUN 29* 25   CREATININE 1.04 0.90   CALCIUM 8.7 8.4       Coags    No recent results     Additional Electrolytes  Recent Labs     08/22/23  2120 08/23/23  0455 08/24/23  0514   MG  --  1.8* 1.7*   PHOS  --   --  3.9   CAIONIZED 1.20  --  1.15          Blood Gas    Recent Labs     08/23/23  0551   PHART 7.408   XPB3YVH 39.2   PO2ART 383.8*   AGE5IUC 24.2   BEART -0.4   SOURCE Brachial, Left     Recent Labs     08/22/23 2031 08/22/23  2315 08/23/23  0551   PHVEN 7.425*  --   --    JVQ4NRF 46.5  --   --    PO2VEN 15.3*  --   --    YAS3KGA 29.8  --   --    BEVEN 4.9  --   --    SOURCE  --    < > Brachial, Left    < > = values in this interval not displayed.     LFTs  Recent Labs     08/23/23 0455 08/24/23  0514   * 215*   * 383*   ALKPHOS 76 62   ALB 2.5* 2.3*   TBILI 0.86 0.71       Infectious  Recent Labs     08/23/23  0455   PROCALCITONI 0.22     Glucose  Recent Labs     08/22/23  1814 08/23/23  0455 08/24/23  0514   GLUC 73 [address]

## 2023-08-24 NOTE — ASSESSMENT & PLAN NOTE
Most likely contributing to her respiratory status initially  -peg tube placed 8/17  -family previously requests continuous tube feeds.

## 2023-08-24 NOTE — ASSESSMENT & PLAN NOTE
· Patient was brought to the ED after being in altered mental status, requiring more oxygen. · History of multiple episodes of vomiting at night prior to admission  · At the time of presentation in the ED, patient's blood glucose 58  · Historically patient has poor appetite and barely eats. · High possibility of aspiration pneumonitis vs mechanical limitation due to LSO brace as the cause  · CT shows the right lower lobe main bronchus appears occluded (2:48). The right lower lung remains aerated. Bibasilar nodular infiltrates are noted  · Rapid Response called for intermittent hypoxia and wob  · At the time of examination, patient is alert, oriented, frail  · cxr shows increased b/l lower opacity    Plan:  · Continue supplementary oxygen to maintain O2 sats above 90%  · Patient was on high flow oxygen, now on midflow  · Our goal is to maintain her oxygen saturation > 90%  · If worsening resp status will require ett placement  · DUO neb q 6 hours   · Aspiration precautions  · Zosyn started- pt finished levoquin and has multiple allergies  · Keep n.p.o. for today restart tube feeding in the a.m.

## 2023-08-24 NOTE — ASSESSMENT & PLAN NOTE
· Patient had right MCA stroke, s/p mechanical thrombectomy earlier this year  · Although the patient moves all the limbs, there is residual weakness in the left upper and lower limb  · CT head on August 8 shows No acute intracranial abnormality    Plan:  · Hold Eliquis for now given dropping hgb  · Frequent neurochecks

## 2023-08-24 NOTE — PLAN OF CARE
Problem: Potential for Falls  Goal: Patient will remain free of falls  Description: INTERVENTIONS:  - Educate patient/family on patient safety including physical limitations  - Instruct patient to call for assistance with activity   - Consult OT/PT to assist with strengthening/mobility   - Keep Call bell within reach  - Keep bed low and locked with side rails adjusted as appropriate  - Keep care items and personal belongings within reach  - Initiate and maintain comfort rounds  - Make Fall Risk Sign visible to staff  - Offer Toileting every 2 Hours, in advance of need  - Initiate/Maintain bed/chair alarm  - Obtain necessary fall risk management equipment: bed/chair alarm, call bell, gripper socks, walker, bedside commode  - Apply yellow socks and bracelet for high fall risk patients  - Consider moving patient to room near nurses station  Outcome: Progressing     Problem: MOBILITY - ADULT  Goal: Maintain or return to baseline ADL function  Description: INTERVENTIONS:  - Educate patient/family on patient safety including physical limitations  - Instruct patient to call for assistance with activity   - Consult OT/PT to assist with strengthening/mobility   - Keep Call bell within reach  - Keep bed low and locked with side rails adjusted as appropriate  - Keep care items and personal belongings within reach  - Initiate and maintain comfort rounds  - Make Fall Risk Sign visible to staff  - Offer Toileting every 2 Hours, in advance of need  - Initiate/Maintain bed/chair alarm  - Obtain necessary fall risk management equipment: bed/chair alarm, call bell, gripper socks, walker, bedside commode  - Apply yellow socks and bracelet for high fall risk patients  - Consider moving patient to room near nurses station  Outcome: Progressing  Goal: Maintains/Returns to pre admission functional level  Description: INTERVENTIONS:  - Perform BMAT or MOVE assessment daily.   - Set and communicate daily mobility goal to care team and patient. - Collaborate with rehabilitation services on mobility goals if consulted  - Reposition patient every 2 hours.   - Dangle patient 3 times a day  - Stand patient 3 times a day  - Ambulate patient 3 times a day  - Out of bed to chair 3 times a day   - Out of bed for meals  - Out of bed for toileting  - Record patient progress and toleration of activity level   Outcome: Progressing     Problem: PAIN - ADULT  Goal: Verbalizes/displays adequate comfort level or baseline comfort level  Description: Interventions:  - Encourage patient to monitor pain and request assistance  - Assess pain using appropriate pain scale  - Administer analgesics based on type and severity of pain and evaluate response  - Implement non-pharmacological measures as appropriate and evaluate response  - Consider cultural and social influences on pain and pain management  - Notify physician/advanced practitioner if interventions unsuccessful or patient reports new pain  Outcome: Progressing     Problem: INFECTION - ADULT  Goal: Absence or prevention of progression during hospitalization  Description: INTERVENTIONS:  - Assess and monitor for signs and symptoms of infection  - Monitor lab/diagnostic results  - Monitor all insertion sites, i.e. indwelling lines, tubes, and drains  - Monitor endotracheal if appropriate and nasal secretions for changes in amount and color  - [place] appropriate cooling/warming therapies per order  - Administer medications as ordered  - Instruct and encourage patient and family to use good hand hygiene technique  - Identify and instruct in appropriate isolation precautions (contact isolation for CRE) for identified infection/condition  Outcome: Progressing     Problem: SAFETY ADULT  Goal: Patient will remain free of falls  Description: INTERVENTIONS:  - Educate patient/family on patient safety including physical limitations  - Instruct patient to call for assistance with activity   - Consult OT/PT to assist with strengthening/mobility   - Keep Call bell within reach  - Keep bed low and locked with side rails adjusted as appropriate  - Keep care items and personal belongings within reach  - Initiate and maintain comfort rounds  - Make Fall Risk Sign visible to staff  - Offer Toileting every 2 Hours, in advance of need  - Initiate/Maintain bed/chair alarm  - Obtain necessary fall risk management equipment: bed/chair alarm, call bell, gripper socks, walker, bedside commode  - Apply yellow socks and bracelet for high fall risk patients  - Consider moving patient to room near nurses station  Outcome: Progressing  Goal: Maintain or return to baseline ADL function  Description: INTERVENTIONS:  - Educate patient/family on patient safety including physical limitations  - Instruct patient to call for assistance with activity   - Consult OT/PT to assist with strengthening/mobility   - Keep Call bell within reach  - Keep bed low and locked with side rails adjusted as appropriate  - Keep care items and personal belongings within reach  - Initiate and maintain comfort rounds  - Make Fall Risk Sign visible to staff  - Offer Toileting every 2 Hours, in advance of need  - Initiate/Maintain bed/chair alarm  - Obtain necessary fall risk management equipment: bed/chair alarm, call bell, gripper socks, walker, bedside commode  - Apply yellow socks and bracelet for high fall risk patients  - Consider moving patient to room near nurses station  Outcome: Progressing  Goal: Maintains/Returns to pre admission functional level  Description: INTERVENTIONS:  - Perform BMAT or MOVE assessment daily.   - Set and communicate daily mobility goal to care team and patient. - Collaborate with rehabilitation services on mobility goals if consulted  - Reposition patient every 2 hours.   - Dangle patient 3 times a day  - Stand patient 3 times a day  - Ambulate patient 3 times a day  - Out of bed to chair 3 times a day   - Out of bed for meals  - Out of bed for toileting  - Record patient progress and toleration of activity level   Outcome: Progressing     Problem: DISCHARGE PLANNING  Goal: Discharge to home or other facility with appropriate resources  Description: INTERVENTIONS:  - Identify barriers to discharge w/patient  - Arrange for needed discharge resources and transportation as appropriate  - Identify discharge learning needs   - Refer to Case Management Department for coordinating discharge planning if the patient needs post-hospital services based on physician/advanced practitioner order or complex needs related to functional status, cognitive ability, or social support system  Outcome: Progressing     Problem: Knowledge Deficit  Goal: Patient/family/caregiver demonstrates understanding of disease process, treatment plan, medications, and discharge instructions  Description: Complete learning assessment and assess knowledge base.   Interventions:  - Provide teaching at level of understanding  - Provide teaching via preferred learning methods  Outcome: Progressing     Problem: Prexisting or High Potential for Compromised Skin Integrity  Goal: Skin integrity is maintained or improved  Description: INTERVENTIONS:  - Identify patients at risk for skin breakdown  - Assess and monitor skin integrity  - Assess and monitor nutrition and hydration status  - Monitor labs   - Assess for incontinence   - Turn and reposition patient  - Assist with mobility/ambulation  - Relieve pressure over bony prominences  - Avoid friction and shearing  - Provide appropriate hygiene as needed including keeping skin clean and dry  - Evaluate need for skin moisturizer/barrier cream  - Collaborate with interdisciplinary team   - Patient/family teaching  - Consider wound care consult   Outcome: Progressing     Problem: Nutrition/Hydration-ADULT  Goal: Nutrient/Hydration intake appropriate for improving, restoring or maintaining nutritional needs  Description: Monitor and assess patient's nutrition/hydration status for malnutrition. Collaborate with interdisciplinary team and initiate plan and interventions as ordered. Monitor patient's weight and dietary intake as ordered or per policy. Utilize nutrition screening tool and intervene as necessary. Determine patient's food preferences and provide high-protein, high-caloric foods as appropriate.      INTERVENTIONS:  - Monitor oral intake, urinary output, labs, and treatment plans  - Assess nutrition and hydration status and recommend course of action  - Evaluate amount of meals eaten  - Do not leave patient alone while eating due to aspiration risk  - Assist patient with eating if necessary   - Allow adequate time for meals  - Recommend/ encourage appropriate diets, oral nutritional supplements, and vitamin/mineral supplements  - Order, calculate, and assess calorie counts as needed  - Assess need for intravenous fluids  - Provide specific nutrition/hydration education as appropriate  - Include patient in decisions related to nutrition  Outcome: Progressing     Problem: RESPIRATORY - ADULT  Goal: Achieves optimal ventilation and oxygenation  Description: INTERVENTIONS:  - Assess for changes in respiratory status  - Assess for changes in mentation and behavior  - Position to facilitate oxygenation and minimize respiratory effort  - Oxygen administered by appropriate delivery if ordered   - Encourage broncho-pulmonary hygiene including cough, deep breathe, Incentive Spirometry, flutter valve  - Assess the need for suctioning and aspirate as needed  - Assess and instruct to report SOB or any respiratory difficulty  - Respiratory Therapy support as indicated  Outcome: Progressing     Problem: GENITOURINARY - ADULT  Goal: Maintains or returns to baseline urinary function  Description: INTERVENTIONS:  - Assess urinary function  - Encourage oral fluids to ensure adequate hydration if ordered  - Administer IV fluids as ordered to ensure adequate hydration  - Administer ordered medications as needed  - Offer frequent toileting  - Follow urinary retention protocol if ordered  Outcome: Progressing  Goal: Urinary catheter remains patent  Description: INTERVENTIONS:  - Assess patency of urinary catheter  - Follow guidelines for intermittent irrigation of non-functioning urinary catheter  Outcome: Progressing

## 2023-08-24 NOTE — PLAN OF CARE
Problem: OCCUPATIONAL THERAPY ADULT  Goal: Performs self-care activities at highest level of function for planned discharge setting. See evaluation for individualized goals. Description: Treatment Interventions: ADL retraining, Functional transfer training, UE strengthening/ROM, Endurance training, Cognitive reorientation, Patient/family training, Equipment evaluation/education, Compensatory technique education          See flowsheet documentation for full assessment, interventions and recommendations. Note: Limitation: Decreased ADL status, Decreased UE strength, Decreased Safe judgement during ADL, Decreased cognition, Decreased endurance, Decreased high-level ADLs  Prognosis: Fair  Assessment: Pt seen for 35min tx session with focus on functional mobility, functional balance, ADL status, transfer safety, b/l UE exercises, and cognition. Pt transferred to ICU yesterday 2* respiratory distress. Pt currently on 6 liters of O2 and allowed activities as tolerated per critical care team(i.e.Emili). Pt able to tolerate OOB mobility; sitting balance=f/f-, standing balance=p+/p. Pt demonstrating need for heavy assistance with her ADLs, functional mobility, and to maintain functional balance. Reviewed proper  techniques with limited carryover noted. Cognitive deficits noted--problem-solving, judgement/safety. Pt continues to demonstrate appropriateness for inpt rehab to improve her overall level of independence. Will continue. The patient's raw score on the AM-PAC Daily Activity Inpatient Short Form is 14. A raw score of less than 19 suggests the patient may benefit from discharge to post-acute rehabilitation services. Please refer to the recommendation of the Occupational Therapist for safe discharge planning.      OT Discharge Recommendation: Post acute rehabilitation services

## 2023-08-24 NOTE — CASE MANAGEMENT
Case Management Progress Note    Patient name Halle Left  Location ICU 10/ICU 10 MRN [id number]  : 1955 Date 2023       LOS (days): 14  Geometric Mean LOS (GMLOS) (days): 5.00  Days to GMLOS:-8.5        OBJECTIVE:        Current admission status: Inpatient  Preferred Pharmacy:   [address] [facility]  [address] [address]  Phone: [phone] Fax: [phone] [place] [address] [address] [facility] [address]  Phone: [phone] Fax: [phone]    Primary Care Provider: Juan J Lea DO    Primary Insurance: MEDICARE  Secondary Insurance: [employer]    PROGRESS NOTE:    CM received call from pt's daughter, Garth Jha, asking about paperwork for medical and financial POA. CM informed daughter that financial POA paperwork and 5 wishes would be left in pt's room. Daughter was asking about getting the papers notarized once signed. CM explained that if pt would be able to sign, and if medical provider would allow it, she could look into having a traveling notary come here to the hospital to get documents notarized. CM emphasized the need for pt's daughter to discuss with provider about pt being able to sign any documents.

## 2023-08-24 NOTE — PHYSICAL THERAPY NOTE
Physical Therapy Cancellation Note    Attempt was made to perform PT tx session. Hospital staff currently in pts room , trying to find vein in order for placement of IV and to draw blood. Cancel PT services for today and will cont to follow as able to cont therapy intervention and treatment sessions.

## 2023-08-24 NOTE — ASSESSMENT & PLAN NOTE
Patient has a history of low impact fall that resulted in multiple T-spine fractures  Currently pain controlled by morphine    Plan:  · Morphine 7.5 mg bid prn  · Continue LSO  · Patient can be transferred to chair from bed with assistance

## 2023-08-25 NOTE — PHYSICAL THERAPY NOTE
PHYSICAL THERAPY NOTE          Patient Name: Ambika Pandey  LLJJH'Z Date: 8/25/2023  11:30-12:08       08/25/23 1130   PT Last Visit   PT Visit Date 08/25/23   Note Type   Note Type Treatment   Pain Assessment   Pain Location/Orientation Location: Back   Pain Rating: FLACC (Rest) - Face 0   Pain Rating: FLACC (Rest) - Legs 0   Pain Rating: FLACC (Rest) - Activity 0   Pain Rating: FLACC (Rest) - Cry 1   Pain Rating: FLACC (Rest) - Consolability 0   Score: FLACC (Rest) 1   Pain Rating: FLACC (Activity) - Face 1   Pain Rating: FLACC (Activity) - Legs 1   Pain Rating: FLACC (Activity) - Activity 1   Pain Rating: FLACC (Activity) - Cry 1   Pain Rating: FLACC (Activity) - Consolability 1   Score: FLACC (Activity) 5   Restrictions/Precautions   Braces or Orthoses LSO   Other Precautions Contact/isolation;Cognitive; Chair Alarm; Bed Alarm;Aspiration;Multiple lines;Telemetry;O2;Fall Risk;Pain  (LSO.  O2 2L, tube feeds.)   General   Chart Reviewed Yes   Response to Previous Treatment Patient with no complaints from previous session. Family/Caregiver Present No   Cognition   Overall Cognitive Status Impaired   Arousal/Participation Alert   Attention Attends with cues to redirect   Orientation Level Oriented to person;Oriented to place;Oriented to time;Disoriented to situation   Following Commands Follows one step commands with increased time or repetition   Comments Redirection needed. Confused. Subjective   Subjective Asking if her car is in the parking lot. Wants to get dressed and go home. Bed Mobility   Supine to Sit 2  Maximal assistance   Additional items Assist x 2; Increased time required;Verbal cues;LE management   Additional Comments dependent to don LSO at EOB. Initially refusing to wear. Education provided. Requests to remove brace seated in recliner.    Transfers   Sit to Stand 3  Moderate assistance   Additional items Assist x 2; Increased time required;Verbal cues   Stand to Sit 3  Moderate assistance   Additional items Assist x 2; Increased time required;Verbal cues   Additional Comments Cues for hand placement, increaed time to complete. Flexed, kyphotic posture. Ambulation/Elevation   Gait pattern Improper Weight shift;Decreased foot clearance   Gait Assistance 3  Moderate assist   Additional items Assist x 2;Verbal cues; Tactile cues   Assistive Device Rolling walker   Distance 3'x1 with RW and LSO. A for RW management. Ambulation/Elevation Additional Comments Flexed posture, increased RR rate, decreased foot clearance and step length. A for RW management. Balance   Static Sitting Fair -   Dynamic Sitting Poor +   Static Standing Poor   Dynamic Standing Poor -   Ambulatory Poor -   Endurance Deficit   Endurance Deficit Yes   Endurance Deficit Description increased resp rate, weakness, deconditioning. Activity Tolerance   Activity Tolerance Patient limited by fatigue;Treatment limited secondary to medical complications (Comment)   Medical Staff Made Aware Enrique Gonzales. OT-Logan: Pt seen for co-evaluation/treatment with skilled Occupational Therapist 2* clinically unstable/unpredictable presentation, medical complexity, fall risk, cognitive impairments, functional/physical limitations, impaired functional balance, decreased safety awareness, limited activity tolerance which is decline from PLOF and may impact overall functional mobility/mobility safety. Nurse Made Aware Nuzhat Gonzales-yes. Assessment   Prognosis Fair   Problem List Decreased strength;Decreased range of motion;Decreased endurance; Impaired balance;Decreased mobility; Decreased cognition; Impaired judgement;Decreased safety awareness;Pain;Orthopedic restrictions   Assessment Seen for therapy treatment following transfer to ICU 2* respiratory status. Oriented x 3, however confused. Frequent redirection needed. Argumentative about going home.   Max A of 2 for bed mobility needed. Encouragement needed to don LSO, initially declining use. Donned/doffed dependently. Mod A of 2 for transfers and to ambulate few feet OOB to chair with RW support.  + fatigue and significant increase in RR observed. Once seated, cues needed to encourage proper breathing technique to recover. LSO doffed to remain in chair. Increased time required for all mobility with pacing. BP /66 and /67. O2 sats on 2L 90-95% on 2L. Given impairments will continue to require skilled PT in order to progress. The patient's AM-PAC Basic Mobility Inpatient Short Form Raw Score is 7. A Raw score of less than or equal to 16 suggests the patient may benefit from discharge to post-acute rehabilitation services. Please also refer to the recommendation of the Physical Therapist for safe discharge planning. At this time STR is recommended. PT will follow and progress per POC 3-5x/wk. Goals   Patient Goals go home   STG Expiration Date 09/08/23   Short Term Goal #1 14 days: 1). Pt will perform bed mobility with Leana demonstrating appropriate technique 100% of the time in order to improve function. 2)  Perform all transfers with Leana demonstrating safe and appropriate technique 100% of the time in order to improve ability to negotiate safely in home environment. 3) Amb with least restrictive AD > 100'x1 with mod I in order to demonstrate ability to negotiate in home environment. 4)  Improve overall strength and balance 1/2 grade in order to optimize ability to perform functional tasks and reduce fall risk. 5) Increase activity tolerance to 30 minutes in order to improve endurance to functional tasks. 6)  Negotiate stairs using most appropriate technique and S in order to be able to negotiate safely in home environment. 7) PT for ongoing patient and family/caregiver education, DME needs and d/c planning in order to promote highest level of function in least restrictive environment.    Plan Treatment/Interventions Functional transfer training;LE strengthening/ROM; Elevations; Therapeutic exercise; Endurance training;Patient/family training;Equipment eval/education; Bed mobility;Gait training; Compensatory technique education;Continued evaluation;Spoke to nursing;OT   Progress Slow progress, decreased activity tolerance   PT Frequency 3-5x/wk   Recommendation   PT Discharge Recommendation Post acute rehabilitation services   Equipment Recommended Walker   AM-PAC Basic Mobility Inpatient   Turning in Flat Bed Without Bedrails 2   Lying on Back to Sitting on Edge of Flat Bed Without Bedrails 1   Moving Bed to Chair 1   Standing Up From Chair Using Arms 1   Walk in Room 1   Climb 3-5 Stairs With Railing 1   Basic Mobility Inpatient Raw Score 7   Turning Head Towards Sound 4   Follow Simple Instructions 3   Low Function Basic Mobility Raw Score  14   Low Function Basic Mobility Standardized Score  22.01   Highest Level Of Mobility   JH-HLM Goal 2: Bed activities/Dependent transfer   JH-HLM Achieved 4: Move to chair/commode   Education   Education Provided Mobility training;Assistive device   Patient Reinforcement needed   End of Consult   Patient Position at End of Consult Bedside chair; All needs within reach;Bed/Chair alarm activated   End of Consult Comments /67.  O2 sat on 2L 91%   Para Nestor, PT

## 2023-08-25 NOTE — PLAN OF CARE
Problem: PHYSICAL THERAPY ADULT  Goal: Performs mobility at highest level of function for planned discharge setting. See evaluation for individualized goals. Description: Treatment/Interventions: Functional transfer training, LE strengthening/ROM, Elevations, Therapeutic exercise, Endurance training, Patient/family training, Bed mobility, Gait training, Spoke to nursing, OT  Equipment Recommended: Ana Hicks (pt has)       See flowsheet documentation for full assessment, interventions and recommendations. Outcome: Progressing  Note: Prognosis: Fair  Problem List: Decreased strength, Decreased range of motion, Decreased endurance, Impaired balance, Decreased mobility, Decreased cognition, Impaired judgement, Decreased safety awareness, Pain, Orthopedic restrictions  Assessment: Seen for therapy treatment following transfer to ICU 2* respiratory status. Oriented x 3, however confused. Frequent redirection needed. Argumentative about going home. Max A of 2 for bed mobility needed. Encouragement needed to don LSO, initially declining use. Donned/doffed dependently. Mod A of 2 for transfers and to ambulate few feet OOB to chair with RW support.  + fatigue and significant increase in RR observed. Once seated, cues needed to encourage proper breathing technique to recover. LSO doffed to remain in chair. Increased time required for all mobility with pacing. BP /66 and /67. O2 sats on 2L 90-95% on 2L. Given impairments will continue to require skilled PT in order to progress. The patient's AM-PAC Basic Mobility Inpatient Short Form Raw Score is 7. A Raw score of less than or equal to 16 suggests the patient may benefit from discharge to post-acute rehabilitation services. Please also refer to the recommendation of the Physical Therapist for safe discharge planning. At this time STR is recommended. PT will follow and progress per POC 3-5x/wk.         PT Discharge Recommendation: Post acute rehabilitation services    See flowsheet documentation for full assessment.

## 2023-08-25 NOTE — CASE MANAGEMENT
Case Management Discharge Planning Note    Patient name Mariela Pineda  Location ICU 10/ICU 10 MRN [id number]  : 1955 Date 2023       Current Admission Date: 8/10/2023  Current Admission Diagnosis:Acute respiratory failure with hypoxia [facility])   Patient Active Problem List    Diagnosis Date Noted   • Hypoglycemia 2023   • Closed T12 fracture ([address]) 08/10/2023   • Compression fracture of body of thoracic vertebra ([address]) 08/10/2023   • Acute respiratory failure with hypoxia ([address]) 08/10/2023   • Chronic combined systolic and diastolic heart failure ([address]) 08/10/2023   • Fall 2023   • Cervical dystonia 2023   • Sundowning 2023   • Night terrors 2023   • Leg hematoma, left, initial encounter 2023   • Cerebral aneurysm, nonruptured 05/15/2023   • Nocturnal hypoxia 2023   • Insomnia 2023   • Sinus tachycardia 2023   • Altered mental status 2023   • Gout of foot 2023   • Dysphagia 2023   • Wound of left ankle 2023   • Urinary tract infection without hematuria 2023   • Obstructive sleep apnea 2023   • History of stroke 2023   • History of bacteremia 2023   • Acute CVA (cerebrovascular accident) ([address]) 2023   • Hypercalcemia 2023   • Discoloration of skin of foot 2023   • HFrEF (heart failure with reduced ejection fraction) ([address]) 2023   • Stroke ([address]) 2023   • HTN (hypertension) 2023   • Cardiomyopathy ([address]) 2023   • Vitamin D insufficiency 2023   • LAILA (acute kidney injury) ([address]) 2022   • Polypharmacy 2022   • 'light-for-dates' infant with signs of fetal malnutrition 2022   • Bipolar depression ([address]) 2022   • Rheumatoid arthritis of multiple sites with negative rheumatoid factor ([address]) 2022   • Closed fracture of multiple ribs of right side 2022   • Traumatic pneumothorax 2022   • Closed fracture of transverse process of lumbar vertebra ([address]) 03/02/2022   • Liver contusion 03/02/2022   • Acute pain due to trauma 03/02/2022   • Acute pain of right shoulder 03/02/2022   • Severe protein-calorie malnutrition ([address]) 03/02/2022   • Undifferentiated connective tissue disease ([address]) 12/29/2021   • Primary generalized (osteo)arthritis 12/29/2021   • Peripheral neuropathy 12/29/2021   • Osteoporosis 12/29/2021   • Hx of compression fracture of spine 12/29/2021   • Lumbar spondylosis 12/29/2021   • Cervical spondylosis 12/29/2021   • Anemia 06/26/2021   • Chronic pain syndrome    • Ambulatory dysfunction    • Acute metabolic encephalopathy    • Seronegative arthropathy of multiple sites ([address])    • Gastroesophageal reflux disease without esophagitis    • Mixed stress and urge urinary incontinence    • CHF (congestive heart failure) ([address])       LOS (days): 15  Geometric Mean LOS (GMLOS) (days): 5.00  Days to GMLOS:-9.6     OBJECTIVE:  Risk of Unplanned Readmission Score: 50.57         Current admission status: Inpatient   Preferred Pharmacy:   [facility]  [address] [address]  Phone: [phone] Fax: [facility] [address] [facility] [address] [address]  Phone: [phone] Fax: [phone]    Primary Care Provider: Kirk Silverman DO    Primary Insurance: MEDICARE  Secondary Insurance: [facility]    DISCHARGE DETAILS:    Discharge planning discussed with[de-identified] Pt and daughter  Freedom of Choice: Yes  Comments - Freedom of Choice: Pt declining STR at this time and wants to proceed w/ C through [facility]  CM contacted family/caregiver?: Yes             Contacts  Patient Contacts: Tulio Acosta  Relationship to Patient[de-identified] Family  Contact Method: Phone  Phone Number: [phone]  Reason/Outcome: Emergency Contact, Discharge Planning                                                                     Additional Comments: CM spoke with daughter Jayden Romo via phone to review recommendation for STR. Jayden Romo believes pt would benefit from [facility] but  Jayden Romo stated she wants to leave the choice to pt. Jayden Romo stated her brother (pt's son whom pt resides with) tested positive for COVID on tuesday. CM met with pt at bedside to review recommendation. Pt declining STR at this time and wants to proceed with [facility]. CM encouraged pt to reconsider but pt adamant she wants to return home and does not want to d/c to STR. Pt remains on continuous tube feeds but plan is for pt to transition to bolus feeds. New tube feed order required once pt on bolus feeds. Pt will move to med surg when bed available.

## 2023-08-25 NOTE — ASSESSMENT & PLAN NOTE
· Patient was drowsy but responds appropriately at this time. · Restarted home meds and sleep deprivation could be contributing  · CT shows 8/10  No acute intracranial abnormality  · Patient is alert oriented to voice, moves all limbs, but has residual weakness from right MCA stroke in February 2023 at this time.   Plan:  · Continue monitoring fingerstick blood glucose every 6 hours  · Hypoglycemia protocol  · Continue D5w for now  · Frequent neurochecks

## 2023-08-25 NOTE — PROGRESS NOTES
[facility]  Progress Note  Name: Omari Perez  MRN: [id number]  Unit/Bed#: ICU 10 I Date of Admission: 8/10/2023   Date of Service: 8/25/2023 I Hospital Day: 15    Assessment/Plan   * Acute respiratory failure with hypoxia [facility])  Assessment & Plan  · Patient was brought to the ED after being in altered mental status, requiring more oxygen. · History of multiple episodes of vomiting at night prior to admission  · At the time of presentation in the ED, patient's blood glucose 58  · Historically patient has poor appetite and barely eats. · High possibility of aspiration pneumonitis vs mechanical limitation due to LSO brace as the cause  · CT shows the right lower lobe main bronchus appears occluded (2:48). The right lower lung remains aerated. Bibasilar nodular infiltrates are noted  · Rapid Response called for intermittent hypoxia and wob  · At the time of examination, patient is alert, oriented, frail  · cxr shows increased b/l lower opacity, oxygenation rapidly improved after diuresis    Plan:  · Continue supplementary oxygen to maintain O2 sats above 90%  · Patient was on high flow oxygen, now on 2 L NC  · Our goal is to maintain her oxygen saturation > 90%  · If worsening resp status will require ett placement  · DUO neb q 6 hours   · Aspiration precautions  · Monitor off of antibiotics  · No further diuresis at this time, patient appears dry  · Tube feeds restarted, low rate                 Dysphagia  Assessment & Plan  Most likely contributing to her respiratory status initially  -peg tube placed 8/17  -family previously requests continuous tube feeds. HFrEF (heart failure with reduced ejection fraction) ([facility])  Assessment & Plan  Wt Readings from Last 3 Encounters:   08/25/23 59.6 kg (131 lb 6.3 oz)   07/29/23 49.4 kg (108 lb 14.4 oz)   06/30/23 55.3 kg (122 lb)     -Hold further diuresis  -monitor I/o's  -ef 30%/ mod AR  -cont BB for now.  Hold if hypotension or bradycardia        Altered mental status  Assessment & Plan  · Patient was drowsy but responds appropriately at this time. · Restarted home meds and sleep deprivation could be contributing  · CT shows 8/10  No acute intracranial abnormality  · Patient is alert oriented to voice, moves all limbs, but has residual weakness from right MCA stroke in February 2023 at this time. Plan:  · Continue monitoring fingerstick blood glucose every 6 hours  · Hypoglycemia protocol  · Continue D5w for now  · Frequent neurochecks    Closed T12 fracture ([address])  Assessment & Plan  Patient has a history of low impact fall that resulted in multiple T-spine fractures  Currently pain controlled by morphine    Plan:  · Morphine 7.5 mg bid prn  · Continue LSO  · Patient can be transferred to chair from bed with assistance    History of stroke  Assessment & Plan  · Patient had right MCA stroke, s/p mechanical thrombectomy earlier this year  · Although the patient moves all the limbs, there is residual weakness in the left upper and lower limb  · CT head on August 8 shows No acute intracranial abnormality    Plan:  · Hold Eliquis for now given dropping hgb  · Frequent neurochecks      Bipolar depression ([address])  Assessment & Plan  -no ativan  -cont wellbutrin and duloxetine  -cont gabapentin and lamictal. Lower dose started 2/2 sedation and uptitrate as able. Anemia  Assessment & Plan  Recent Labs     08/24/23  0514 08/24/23  1724 08/25/23  0343   HGB 6.9* 9.1* 8.8*     Hgb has been slowly dropping over the last two weeks  Responded well to transfusion  No signs or symptoms of GI bleed  CT shows no intraperitoneal collection. small healing hematoma of left hip noted. Haptoglobin and LDH wnl  Trend CBC, transfuse for Hgb < 7             ICU Core Measures     A: Assess, Prevent, and Manage Pain · Has pain been assessed? Yes  · Need for changes to pain regimen?  No   B: Both SAT/SAT  · N/A   C: Choice of Sedation · RASS Goal: N/A patient not on sedation  · Need for changes to sedation or analgesia regimen? No   D: Delirium · CAM-ICU: Positive   E: Early Mobility  · Plan for early mobility? Yes   F: Family Engagement · Plan for family engagement today? Yes       Review of Invasive Devices:            Prophylaxis:  VTE VTE covered by:  apixaban, Per PEG Tube, 5 mg at 08/25/23 0801       Stress Ulcer  covered byomeprazole (PRILOSEC) suspension 2 mg/mL [[id number]]       Subjective   HPI/24hr events: No overnight events. Patient successfully titrated down to minimal oxygen requirements today. Most likely can be transferred out of the ICU to [facility]           Objective                            Vitals I/O      Most Recent Min/Max in 24hrs   Temp (!) 96.4 °F (35.8 °C) Temp  Min: 96.4 °F (35.8 °C)  Max: 97.9 °F (36.6 °C)   Pulse 96 Pulse  Min: 80  Max: 106   Resp (!) 28 Resp  Min: 14  Max: 55   /74 BP  Min: 87/61  Max: 113/64   O2 Sat 100 % SpO2  Min: 78 %  Max: 100 %      Intake/Output Summary (Last 24 hours) at 8/25/2023 1047  Last data filed at 8/25/2023 1001  Gross per 24 hour   Intake 1580 ml   Output 225 ml   Net 1355 ml         Diet Enteral/Parenteral; Tube Feeding No Oral Diet; Jevity 1.2 Prabhjot; Continuous; 10     Invasive Monitoring Physical exam    Physical Exam  Constitutional:       Comments: cachectic   HENT:      Head: Normocephalic and atraumatic. Mouth/Throat:      Mouth: Mucous membranes are moist.      Pharynx: Oropharynx is clear. No oropharyngeal exudate. Eyes:      General: No scleral icterus. Extraocular Movements: Extraocular movements intact. Conjunctiva/sclera: Conjunctivae normal.      Pupils: Pupils are equal, round, and reactive to light. Cardiovascular:      Rate and Rhythm: Normal rate and regular rhythm. Pulses: Normal pulses. Heart sounds: Normal heart sounds. Pulmonary:      Effort: Pulmonary effort is normal.      Breath sounds: Normal breath sounds.    Abdominal:      General: Abdomen is flat. Bowel sounds are normal. There is no distension. Palpations: Abdomen is soft. Musculoskeletal:      Right lower leg: No edema. Left lower leg: No edema. Comments: Kyphosis present   Neurological:      General: No focal deficit present. Mental Status: She is alert. Cranial Nerves: No cranial nerve deficit.       Comments: Confused about time and place          Diagnostic Studies      EKG: Telemetry reviewed  Imaging:  I have personally reviewed pertinent films in PACS     Medications:  Scheduled PRN   acetaminophen, 975 mg, Q8H  acetylcysteine, 3 mL, Q8H  apixaban, 5 mg, BID  buPROPion, 50 mg, BID  DULoxetine, 60 mg, BID  gabapentin, 100 mg, HS  hydroxychloroquine, 200 mg, BID With Meals  ipratropium-albuterol, 3 mL, Q6H  lamoTRIgine, 50 mg, HS  lidocaine, 1 patch, Daily  metoprolol tartrate, 12.5 mg, Q12H KHARI  omeprazole (PRILOSEC) suspension 2 mg/mL, 20 mg, Daily  potassium chloride, 40 mEq, Once      albuterol, 2 puff, Q6H PRN  aluminum-magnesium hydroxide-simethicone, 30 mL, Q4H PRN  HYDROmorphone, 0.2 mg, Q4H PRN  ondansetron, 4 mg, Q6H PRN       Continuous    dextrose 5 % and sodium chloride 0.9 %, 50 mL/hr, Last Rate: 50 mL/hr (08/24/23 2114)         Labs:    CBC    Recent Labs     08/24/23  0514 08/24/23  1724 08/25/23  0343   WBC 12.28* 13.32* 12.60*   HGB 6.9* 9.1* 8.8*   HCT 23.4* 28.6* 29.3*    206 194   BANDSPCT 3  --   --      BMP    Recent Labs     08/24/23  0514 08/25/23  0343   SODIUM 144 145   K 3.5 2.8*    111*   CO2 27 29   AGAP 9 5   BUN 25 20   CREATININE 0.90 0.81   CALCIUM 8.4 8.4       Coags    No recent results     Additional Electrolytes  Recent Labs     08/24/23  0514 08/25/23  0343   MG 1.7* 1.6*   PHOS 3.9  --    CAIONIZED 1.15  --           Blood Gas    No recent results  No recent results LFTs  Recent Labs     08/24/23  0514 08/25/23  0343   * 204*   * 188*   ALKPHOS 62 70   ALB 2.3* 2.3*   TBILI 0.71 0.74 Infectious  No recent results  Glucose  Recent Labs     08/24/23  0514 08/25/23  0343   GLUC 88 97                   Vivek Aguilar MD

## 2023-08-25 NOTE — ASSESSMENT & PLAN NOTE
· Patient was brought to the ED after being in altered mental status, requiring more oxygen. · History of multiple episodes of vomiting at night prior to admission  · At the time of presentation in the ED, patient's blood glucose 58  · Historically patient has poor appetite and barely eats. · High possibility of aspiration pneumonitis vs mechanical limitation due to LSO brace as the cause  · CT shows the right lower lobe main bronchus appears occluded (2:48). The right lower lung remains aerated.  Bibasilar nodular infiltrates are noted  · Rapid Response called for intermittent hypoxia and wob  · At the time of examination, patient is alert, oriented, frail  · cxr shows increased b/l lower opacity, oxygenation rapidly improved after diuresis    Plan:  · Continue supplementary oxygen to maintain O2 sats above 90%  · Patient was on high flow oxygen, now on 2 L NC  · Our goal is to maintain her oxygen saturation > 90%  · If worsening resp status will require ett placement  · DUO neb q 6 hours   · Aspiration precautions  · Monitor off of antibiotics  · No further diuresis at this time, patient appears dry  · Tube feeds restarted, low rate

## 2023-08-25 NOTE — PROGRESS NOTES
PT currently on [email or web address]. Recommend adjusting formula and advancing to goal as tolerated to [email or web address] x 22hrs (holding TF 1 hr before and 1 hr after prilosec administration), water flushes 135mL every 4hrs provides total of 1452cal, 62g pro, 1545mL. Once tolerating goal transition to bolus feeds Jevity 1.5 jacklyn 8 oz (237ml) bolus x 4/day with 100ml free water flush before and after each feed, will provide 1420kcal, 60g protein, 1520ml free water.

## 2023-08-25 NOTE — OCCUPATIONAL THERAPY NOTE
Occupational Therapy Progress Note     Patient Name: Tom ARIZA Date: 8/25/2023  Problem List  Principal Problem:    Acute respiratory failure with hypoxia [facility])  Active Problems:    Anemia    Bipolar depression ([facility])    Rheumatoid arthritis of multiple sites with negative rheumatoid factor ([facility])    HFrEF (heart failure with reduced ejection fraction) ([facility])    Hypercalcemia    History of stroke    Altered mental status    Dysphagia    Closed T12 fracture ([facility])    Hypoglycemia            08/25/23 1210   Note Type   Note Type Treatment   Pain Assessment   Pain Assessment Tool FLACC   Pain Rating: FLACC (Rest) - Face 0   Pain Rating: FLACC (Rest) - Legs 0   Pain Rating: FLACC (Rest) - Activity 0   Pain Rating: FLACC (Rest) - Cry 0   Pain Rating: FLACC (Rest) - Consolability 0   Score: FLACC (Rest) 0   Restrictions/Precautions   Weight Bearing Precautions Per Order No   Braces or Orthoses LSO   Other Precautions Contact/isolation;Cognitive; Chair Alarm; Bed Alarm;Multiple lines;O2;Fall Risk;Pain;Telemetry;Spinal precautions   ADL   Where Assessed Edge of bed   UB Dressing Assistance 2  Maximal Assistance  (donning/doffing LSO)   Functional Standing Tolerance   Time 1 min   Bed Mobility   Rolling R 2  Maximal assistance   Additional items Assist x 1; Increased time required;Verbal cues;LE management   Rolling L 2  Maximal assistance   Additional items Assist x 1; Increased time required;Verbal cues;LE management   Supine to Sit 2  Maximal assistance   Additional items Assist x 2; Increased time required;Verbal cues;LE management   Transfers   Sit to Stand 3  Moderate assistance   Additional items Assist x 2; Increased time required;Verbal cues   Stand to Sit 3  Moderate assistance   Additional items Assist x 2; Increased time required;Verbal cues   Functional Mobility   Functional Mobility 3  Moderate assistance   Additional Comments x2   Additional items Rolling walker   Subjective   Subjective "I'm going home today. That's my white VW out there."   Cognition   Overall Cognitive Status Impaired   Arousal/Participation Alert   Attention Attends with cues to redirect   Orientation Level Oriented to person;Oriented to place;Oriented to time;Disoriented to situation   Memory Decreased short term memory;Decreased recall of precautions   Following Commands Follows one step commands with increased time or repetition   Activity Tolerance   Activity Tolerance Patient limited by fatigue;Patient limited by pain   Medical Staff Made Aware nsg, P.T. Assessment   Assessment Pt seen for 40 min tx session with focus on functional balance, functional mobility, ADL status, transfer safety, and cognition. Pt able to tolerate OOB mobility; sitting balance=f/f-, standing balance=f-/p+. Pt required verbal/physical cues to maintain transfer safety. Pt demonstrating need for heavy assistance with her ADLs. Cognitive deficits noted--judgement/safety, problem-solving. Pt continues to demonstrate appropriateness for inpt rehab to improve her overall level of independence. Will continue. Plan   Treatment Interventions ADL retraining;Functional transfer training;UE strengthening/ROM; Endurance training;Cognitive reorientation;Patient/family training;Equipment evaluation/education; Compensatory technique education;Continued evaluation   Goal Expiration Date 08/30/23   OT Treatment Day 2   OT Frequency 3-5x/wk   Recommendation   OT Discharge Recommendation Post acute rehabilitation services   AM-PAC Daily Activity Inpatient   Lower Body Dressing 2   Bathing 2   Toileting 2   Upper Body Dressing 2   Grooming 3   Eating 3   Daily Activity Raw Score 14   Daily Activity Standardized Score (Calc for Raw Score >=11) 33.39   AM-PAC Applied Cognition Inpatient   Following a Speech/Presentation 3   Understanding Ordinary Conversation 2   Taking Medications 2   Remembering Where Things Are Placed or Put Away 2   Remembering List of 4-5 Errands 2   Taking Care of Complicated Tasks 2   Applied Cognition Raw Score 13   Applied Cognition Standardized Score 30.46     Hal Montalvo

## 2023-08-25 NOTE — ASSESSMENT & PLAN NOTE
Recent Labs     08/24/23  0514 08/24/23  1724 08/25/23  0343   HGB 6.9* 9.1* 8.8*     Hgb has been slowly dropping over the last two weeks  Responded well to transfusion  No signs or symptoms of GI bleed  CT shows no intraperitoneal collection. small healing hematoma of left hip noted.     Haptoglobin and LDH wnl  Trend CBC, transfuse for Hgb < 7

## 2023-08-25 NOTE — PLAN OF CARE
Problem: Potential for Falls  Goal: Patient will remain free of falls  Description: INTERVENTIONS:  - Educate patient/family on patient safety including physical limitations  - Instruct patient to call for assistance with activity   - Consult OT/PT to assist with strengthening/mobility   - Keep Call bell within reach  - Keep bed low and locked with side rails adjusted as appropriate  - Keep care items and personal belongings within reach  - Initiate and maintain comfort rounds  - Make Fall Risk Sign visible to staff  - Offer Toileting every 2 Hours, in advance of need  - Initiate/Maintain bed/chair alarm  - Obtain necessary fall risk management equipment: bed/chair alarm, call bell, gripper socks, walker, bedside commode  - Apply yellow socks and bracelet for high fall risk patients  - Consider moving patient to room near nurses station  Outcome: Progressing     Problem: MOBILITY - ADULT  Goal: Maintain or return to baseline ADL function  Description: INTERVENTIONS:  - Educate patient/family on patient safety including physical limitations  - Instruct patient to call for assistance with activity   - Consult OT/PT to assist with strengthening/mobility   - Keep Call bell within reach  - Keep bed low and locked with side rails adjusted as appropriate  - Keep care items and personal belongings within reach  - Initiate and maintain comfort rounds  - Make Fall Risk Sign visible to staff  - Offer Toileting every 2 Hours, in advance of need  - Initiate/Maintain bed/chair alarm  - Obtain necessary fall risk management equipment: bed/chair alarm, call bell, gripper socks, walker, bedside commode  - Apply yellow socks and bracelet for high fall risk patients  - Consider moving patient to room near nurses station  Outcome: Progressing  Goal: Maintains/Returns to pre admission functional level  Description: INTERVENTIONS:  - Perform BMAT or MOVE assessment daily.   - Set and communicate daily mobility goal to care team and patient. - Collaborate with rehabilitation services on mobility goals if consulted  - Reposition patient every 2 hours.   - Dangle patient 3 times a day  - Stand patient 3 times a day  - Ambulate patient 3 times a day  - Out of bed to chair 3 times a day   - Out of bed for meals  - Out of bed for toileting  - Record patient progress and toleration of activity level   Outcome: Progressing     Problem: PAIN - ADULT  Goal: Verbalizes/displays adequate comfort level or baseline comfort level  Description: Interventions:  - Encourage patient to monitor pain and request assistance  - Assess pain using appropriate pain scale  - Administer analgesics based on type and severity of pain and evaluate response  - Implement non-pharmacological measures as appropriate and evaluate response  - Consider cultural and social influences on pain and pain management  - Notify physician/advanced practitioner if interventions unsuccessful or patient reports new pain  Outcome: Progressing     Problem: INFECTION - ADULT  Goal: Absence or prevention of progression during hospitalization  Description: INTERVENTIONS:  - Assess and monitor for signs and symptoms of infection  - Monitor lab/diagnostic results  - Monitor all insertion sites, i.e. indwelling lines, tubes, and drains  - Monitor endotracheal if appropriate and nasal secretions for changes in amount and color  - [place] appropriate cooling/warming therapies per order  - Administer medications as ordered  - Instruct and encourage patient and family to use good hand hygiene technique  - Identify and instruct in appropriate isolation precautions (contact isolation for CRE) for identified infection/condition  Outcome: Progressing     Problem: SAFETY ADULT  Goal: Patient will remain free of falls  Description: INTERVENTIONS:  - Educate patient/family on patient safety including physical limitations  - Instruct patient to call for assistance with activity   - Consult OT/PT to assist with strengthening/mobility   - Keep Call bell within reach  - Keep bed low and locked with side rails adjusted as appropriate  - Keep care items and personal belongings within reach  - Initiate and maintain comfort rounds  - Make Fall Risk Sign visible to staff  - Offer Toileting every 2 Hours, in advance of need  - Initiate/Maintain bed/chair alarm  - Obtain necessary fall risk management equipment: bed/chair alarm, call bell, gripper socks, walker, bedside commode  - Apply yellow socks and bracelet for high fall risk patients  - Consider moving patient to room near nurses station  Outcome: Progressing  Goal: Maintain or return to baseline ADL function  Description: INTERVENTIONS:  - Educate patient/family on patient safety including physical limitations  - Instruct patient to call for assistance with activity   - Consult OT/PT to assist with strengthening/mobility   - Keep Call bell within reach  - Keep bed low and locked with side rails adjusted as appropriate  - Keep care items and personal belongings within reach  - Initiate and maintain comfort rounds  - Make Fall Risk Sign visible to staff  - Offer Toileting every 2 Hours, in advance of need  - Initiate/Maintain bed/chair alarm  - Obtain necessary fall risk management equipment: bed/chair alarm, call bell, gripper socks, walker, bedside commode  - Apply yellow socks and bracelet for high fall risk patients  - Consider moving patient to room near nurses station  Outcome: Progressing  Goal: Maintains/Returns to pre admission functional level  Description: INTERVENTIONS:  - Perform BMAT or MOVE assessment daily.   - Set and communicate daily mobility goal to care team and patient. - Collaborate with rehabilitation services on mobility goals if consulted  - Reposition patient every 2 hours.   - Dangle patient 3 times a day  - Stand patient 3 times a day  - Ambulate patient 3 times a day  - Out of bed to chair 3 times a day   - Out of bed for meals  - Out of bed for toileting  - Record patient progress and toleration of activity level   Outcome: Progressing     Problem: DISCHARGE PLANNING  Goal: Discharge to home or other facility with appropriate resources  Description: INTERVENTIONS:  - Identify barriers to discharge w/patient  - Arrange for needed discharge resources and transportation as appropriate  - Identify discharge learning needs   - Refer to Case Management Department for coordinating discharge planning if the patient needs post-hospital services based on physician/advanced practitioner order or complex needs related to functional status, cognitive ability, or social support system  Outcome: Progressing     Problem: Knowledge Deficit  Goal: Patient/family/caregiver demonstrates understanding of disease process, treatment plan, medications, and discharge instructions  Description: Complete learning assessment and assess knowledge base.   Interventions:  - Provide teaching at level of understanding  - Provide teaching via preferred learning methods  Outcome: Progressing     Problem: Prexisting or High Potential for Compromised Skin Integrity  Goal: Skin integrity is maintained or improved  Description: INTERVENTIONS:  - Identify patients at risk for skin breakdown  - Assess and monitor skin integrity  - Assess and monitor nutrition and hydration status  - Monitor labs   - Assess for incontinence   - Turn and reposition patient  - Assist with mobility/ambulation  - Relieve pressure over bony prominences  - Avoid friction and shearing  - Provide appropriate hygiene as needed including keeping skin clean and dry  - Evaluate need for skin moisturizer/barrier cream  - Collaborate with interdisciplinary team   - Patient/family teaching  - Consider wound care consult   Outcome: Progressing     Problem: Nutrition/Hydration-ADULT  Goal: Nutrient/Hydration intake appropriate for improving, restoring or maintaining nutritional needs  Description: Monitor and assess patient's nutrition/hydration status for malnutrition. Collaborate with interdisciplinary team and initiate plan and interventions as ordered. Monitor patient's weight and dietary intake as ordered or per policy. Utilize nutrition screening tool and intervene as necessary. Determine patient's food preferences and provide high-protein, high-caloric foods as appropriate.      INTERVENTIONS:  - Monitor oral intake, urinary output, labs, and treatment plans  - Assess nutrition and hydration status and recommend course of action  - Evaluate amount of meals eaten  - Do not leave patient alone while eating due to aspiration risk  - Assist patient with eating if necessary   - Allow adequate time for meals  - Recommend/ encourage appropriate diets, oral nutritional supplements, and vitamin/mineral supplements  - Order, calculate, and assess calorie counts as needed  - Assess need for intravenous fluids  - Provide specific nutrition/hydration education as appropriate  - Include patient in decisions related to nutrition  Outcome: Progressing     Problem: RESPIRATORY - ADULT  Goal: Achieves optimal ventilation and oxygenation  Description: INTERVENTIONS:  - Assess for changes in respiratory status  - Assess for changes in mentation and behavior  - Position to facilitate oxygenation and minimize respiratory effort  - Oxygen administered by appropriate delivery if ordered   - Encourage broncho-pulmonary hygiene including cough, deep breathe, Incentive Spirometry, flutter valve  - Assess the need for suctioning and aspirate as needed  - Assess and instruct to report SOB or any respiratory difficulty  - Respiratory Therapy support as indicated  Outcome: Progressing     Problem: GENITOURINARY - ADULT  Goal: Maintains or returns to baseline urinary function  Description: INTERVENTIONS:  - Assess urinary function  - Encourage oral fluids to ensure adequate hydration if ordered  - Administer IV fluids as ordered to ensure adequate hydration  - Administer ordered medications as needed  - Offer frequent toileting  - Follow urinary retention protocol if ordered  Outcome: Progressing  Goal: Urinary catheter remains patent  Description: INTERVENTIONS:  - Assess patency of urinary catheter  - Follow guidelines for intermittent irrigation of non-functioning urinary catheter  Outcome: Progressing

## 2023-08-25 NOTE — PLAN OF CARE
Problem: OCCUPATIONAL THERAPY ADULT  Goal: Performs self-care activities at highest level of function for planned discharge setting. See evaluation for individualized goals. Description: Treatment Interventions: ADL retraining, Functional transfer training, UE strengthening/ROM, Endurance training, Cognitive reorientation, Patient/family training, Equipment evaluation/education, Compensatory technique education, Continued evaluation          See flowsheet documentation for full assessment, interventions and recommendations. Note: Limitation: Decreased ADL status, Decreased UE strength, Decreased Safe judgement during ADL, Decreased cognition, Decreased endurance, Decreased high-level ADLs  Prognosis: Fair  Assessment: Pt seen for 40 min tx session with focus on functional balance, functional mobility, ADL status, transfer safety, and cognition. Pt able to tolerate OOB mobility; sitting balance=f/f-, standing balance=f-/p+. Pt required verbal/physical cues to maintain transfer safety. Pt demonstrating need for heavy assistance with her ADLs. Cognitive deficits noted--judgement/safety, problem-solving. Pt continues to demonstrate appropriateness for inpt rehab to improve her overall level of independence. Will continue.      OT Discharge Recommendation: Post acute rehabilitation services

## 2023-08-26 PROBLEM — J90 PLEURAL EFFUSION, BILATERAL: Status: ACTIVE | Noted: 2023-08-26

## 2023-08-26 PROBLEM — R74.01 TRANSAMINITIS: Status: ACTIVE | Noted: 2023-08-26

## 2023-08-26 NOTE — PROGRESS NOTES
[facility]  Progress Note  Name: Adis Odonnell  MRN: [id number]  Unit/Bed#: [place] 2 -01 I Date of Admission: 8/10/2023   Date of Service: 8/26/2023 I Hospital Day: 16    Assessment/Plan   * Acute respiratory failure with hypoxia [facility])  Assessment & Plan  Secondary to  aspiration pneumonitis,  bilateral pleural effusion, deconditioning. He was transferred out of the ICU yesterday. Today she has increased oxygen requirement. She completed her antibiotic course. She has bilateral pleural effusion ascites and peripheral edema. We will give 1 dose of Lasix and monitor since her blood pressure is borderline low. Low threshold for return back to the ICU if she decompensates    Pleural effusion, bilateral  Assessment & Plan  · Lateral pleural effusion related to chronic congestive  systolic heart failure and hypoalbuminemia. · Due to increasing oxygen requirement evidence of fluid overload, will give Lasix with close monitoring of her blood pressure. Altered mental status  Assessment & Plan  · Metabolic encephalopathy, multifactorial due to hypoxia, ICU delirium, aspiration pneumonitis, transaminitis  · Continue soft restraints due to high risk of falls and inability to follow  · Supportive care and constant reorientation. · Treat primary problems. Transaminitis  Assessment & Plan  · Transaminitis likely related to diminished perfusion from congestive heart failure  · Discussed with GI    HFrEF (heart failure with reduced ejection fraction) ([facility])  Assessment & Plan  Wt Readings from Last 3 Encounters:   08/26/23 58.6 kg (129 lb 3 oz)   07/29/23 49.4 kg (108 lb 14.4 oz)   06/30/23 55.3 kg (122 lb)     · We will give 1 dose of Lasix due to increasing oxygen requirement and evidence of fluid overload.   · Will 1 dose of IV albumin prior to help stabilize her BP prior to the lasix    Bipolar depression ([address])  Assessment & Plan  · Occasions discussed with the patient. · Continue  Lamictal, gabapentin,  Wellbutrin, Cymbalta  · Watch for excessive sedation    Anemia  Assessment & Plan  · Unclear etiology with no active bleeding. · Possibly from prolonged hospitalization and frequent blood draws with small left hip hematoma. · Hemoglobin is stable at 9.3  · Monitor and transfuse as needed  · Watch for bleeding    Dysphagia  Assessment & Plan  · Post PEG tube placement. · GI evaluation and recommendations reviewed. History of stroke  Assessment & Plan  · History of right-sided stroke with residual left-sided weakness  · Maintained on Eliquis. Rheumatoid arthritis of multiple sites with negative rheumatoid factor (HCC)  Assessment & Plan  · Continue hydroxychloroquine for now         VTE Pharmacologic Prophylaxis:   eliquis    Patient Centered Rounds: I performed bedside rounds with nursing staff today. Discussions with Specialists or Other Care Team Provider: Discussed with ICU provider Dr. Jaime Saunders and Discussions with Family / Patient: Updated  (son and daughter) via phone. Spoke with Meng Molina (son) briefly who told me to call Kenia Teo (daughter). I updated her regarding the patient's status and plan    Current Length of Stay: 16 day(s)  Current Patient Status: Inpatient   Certification Statement: The patient will continue to require additional inpatient hospital stay due to respiratory failure  Discharge Plan: Anticipate discharge in >72 hrs to rehab facility. Code Status: Level 1 - Full Code    Subjective:   Confused  She wants to go home with Meng Molina  She is still on O2, restraints  She was transferred from ICU yesterday    Objective:     Vitals:   Temp (24hrs), Av.1 °F (36.2 °C), Min:96.6 °F (35.9 °C), Max:97.5 °F (36.4 °C)    Temp:  [96.6 °F (35.9 °C)-97.5 °F (36.4 °C)] 96.6 °F (35.9 °C)  HR:  [59-96] 91  Resp:  [18-40] 19  BP: ()/(54-69) 104/68  SpO2:  [55 %-100 %] 74 %  Body mass index is 22.18 kg/m².      Input and Output Summary (last 24 hours): Intake/Output Summary (Last 24 hours) at 8/26/2023 1245  Last data filed at 8/25/2023 1800  Gross per 24 hour   Intake 772 ml   Output 125 ml   Net 647 ml       Physical Exam:   Physical Exam  Constitutional:       Appearance: She is ill-appearing. HENT:      Head: Atraumatic. Nose: No congestion or rhinorrhea. Eyes:      General: No scleral icterus. Cardiovascular:      Rate and Rhythm: Regular rhythm. Pulmonary:      Comments:  poor effort  Diminished breath sounds at the bases  Abdominal:      Comments: Status post PEG   Musculoskeletal:      Cervical back: Neck supple. Right lower leg: Edema present. Left lower leg: Edema present. Skin:     Findings: Bruising present. Neurological:      Mental Status: She is disoriented. Psychiatric:      Comments:  confused       Additional Data:     Labs:  Results from last 7 days   Lab Units 08/26/23  0653 08/24/23  1724 08/24/23  0514 08/22/23  0523 08/21/23  0518   WBC Thousand/uL 14.73*   < > 12.28*   < > 9.72   HEMOGLOBIN g/dL 9.3*   < > 6.9*   < > 7.6*   I STAT HEMOGLOBIN   --   --   --    < >  --    HEMATOCRIT % 31.5*   < > 23.4*   < > 24.6*   HEMATOCRIT, ISTAT   --   --   --    < >  --    PLATELETS Thousands/uL 212   < > 188   < > 165   BANDS PCT %  --   --  3  --   --    NEUTROS PCT %  --   --   --   --  78*   LYMPHS PCT %  --   --   --   --  11*   LYMPHO PCT % 9*  --  7*   < >  --    MONOS PCT %  --   --   --   --  10   MONO PCT % 5  --  9   < >  --    EOS PCT % 0  --  0   < > 0    < > = values in this interval not displayed.      Results from last 7 days   Lab Units 08/26/23  0653   SODIUM mmol/L 146   POTASSIUM mmol/L 4.1   CHLORIDE mmol/L 114*   CO2 mmol/L 23   BUN mg/dL 25   CREATININE mg/dL 1.10   ANION GAP mmol/L 9   CALCIUM mg/dL 9.0   ALBUMIN g/dL 2.4*   TOTAL BILIRUBIN mg/dL 0.89   ALK PHOS U/L 107*   ALT U/L 723*   AST U/L 1,604*   GLUCOSE RANDOM mg/dL 95         Results from last 7 days   Lab Units 08/26/23  1214 08/26/23  1153 08/26/23  1013 08/26/23  0747 08/26/23  0610 08/26/23  0355 08/26/23  0217 08/26/23  0018 08/25/23  2017 08/25/23  1611 08/25/23  1610 08/25/23  1418   POC GLUCOSE mg/dl 58* 48* 125 218* 79 118 269* 105 139 158* 325* 108         Results from last 7 days   Lab Units 08/23/23  0455   PROCALCITONIN ng/ml 0.22       Lines/Drains:  Invasive Devices     Peripheral Intravenous Line  Duration           Long-Dwell Peripheral IV (Midline) 08/18/23 Right Brachial 7 days          Drain  Duration           Gastrostomy/Enterostomy Percutaneous Endoscopic Gastrostomy (PEG) 20 Fr.  Umbilicus 8 days    External Urinary Catheter 1 day                       Imaging: Reviewed radiology reports from this admission including: abdominal/pelvic CT    Recent Cultures (last 7 days):         Last 24 Hours Medication List:   Current Facility-Administered Medications   Medication Dose Route Frequency Provider Last Rate   • acetaminophen  975 mg Per G Tube Q8H Lianet Marroquin PA-C     • Albumin 25%  12.5 g Intravenous Once Belen Jaramillo MD     • albuterol  2 puff Inhalation Q6H PRN Jeet Fung PA-C     • aluminum-magnesium hydroxide-simethicone  30 mL Oral Q4H PRN Lianet Marroquin PA-C     • apixaban  5 mg Per PEG Tube BID Lianet Marroquin PA-C     • buPROPion  50 mg Per G Tube BID Lianet Marroquin PA-C     • dextrose 5 % and sodium chloride 0.9 %  50 mL/hr Intravenous Continuous Lianet Marroquin PA-C 50 mL/hr (08/25/23 1751)   • DULoxetine  60 mg Per G Tube BID Lianet Marroquin PA-C     • furosemide  20 mg Intravenous Once Belen Jaramillo MD     • gabapentin  100 mg Oral HS Lianet Marroquin PA-C     • HYDROmorphone  0.2 mg Intravenous Q4H PRN Lianet Marroquin PA-C     • hydroxychloroquine  200 mg Per G Tube BID With Meals Lianet Marroquin PA-C     • ipratropium-albuterol  3 mL Nebulization TID Lianet Marroquin PA-C     • lamoTRIgine  50 mg Per G Tube HS Lianet Judi Butcher PA-C     • lidocaine  1 patch Topical Daily Lianet Marroquin PA-C     • metoprolol tartrate  12.5 mg Per G Tube Q12H [address] [facility] [place] Marroquin PA-C     • omeprazole (PRILOSEC) suspension 2 mg/mL  20 mg Per G Tube Daily Lianet Marroquin PA-C     • ondansetron  4 mg Intravenous Q6H PRN Angel Diehl PA-C          Today, Patient Was Seen By: Duke Sumner MD    **Please Note: This note may have been constructed using a voice recognition system. **

## 2023-08-26 NOTE — NUTRITION
08/26/23 1329   Recommendations/Interventions   Summary RD consult for TF recs, please see nutrition consult in progress notes dated 8/25/23 for recommendations.

## 2023-08-26 NOTE — TREATMENT PLAN
She has high tylenol levels  DC tylenol  Start acetadote  Discussed with GI Dr. Santos Beltran toxicology per protocol  Discussed with daughter and gave update

## 2023-08-26 NOTE — ASSESSMENT & PLAN NOTE
· Metabolic encephalopathy, multifactorial due to hypoxia, ICU delirium, aspiration pneumonitis, transaminitis  · Continue soft restraints due to high risk of falls and inability to follow  · Supportive care and constant reorientation. · Treat primary problems.

## 2023-08-26 NOTE — ASSESSMENT & PLAN NOTE
· Occasions discussed with the patient.   · Continue  Lamictal, gabapentin,  Wellbutrin, Cymbalta  · Watch for excessive sedation

## 2023-08-26 NOTE — PLAN OF CARE
Problem: Potential for Falls  Goal: Patient will remain free of falls  Description: INTERVENTIONS:  - Educate patient/family on patient safety including physical limitations  - Instruct patient to call for assistance with activity   - Consult OT/PT to assist with strengthening/mobility   - Keep Call bell within reach  - Keep bed low and locked with side rails adjusted as appropriate  - Keep care items and personal belongings within reach  - Initiate and maintain comfort rounds  - Make Fall Risk Sign visible to staff  - Offer Toileting every 2 Hours, in advance of need  - Initiate/Maintain bed/chair alarm  - Obtain necessary fall risk management equipment: bed/chair alarm, call bell, gripper socks, walker, bedside commode  - Apply yellow socks and bracelet for high fall risk patients  - Consider moving patient to room near nurses station  Outcome: Progressing     Problem: MOBILITY - ADULT  Goal: Maintain or return to baseline ADL function  Description: INTERVENTIONS:  - Educate patient/family on patient safety including physical limitations  - Instruct patient to call for assistance with activity   - Consult OT/PT to assist with strengthening/mobility   - Keep Call bell within reach  - Keep bed low and locked with side rails adjusted as appropriate  - Keep care items and personal belongings within reach  - Initiate and maintain comfort rounds  - Make Fall Risk Sign visible to staff  - Offer Toileting every 2 Hours, in advance of need  - Initiate/Maintain bed/chair alarm  - Obtain necessary fall risk management equipment: bed/chair alarm, call bell, gripper socks, walker, bedside commode  - Apply yellow socks and bracelet for high fall risk patients  - Consider moving patient to room near nurses station  Outcome: Progressing  Goal: Maintains/Returns to pre admission functional level  Description: INTERVENTIONS:  - Perform BMAT or MOVE assessment daily.   - Set and communicate daily mobility goal to care team and patient. - Collaborate with rehabilitation services on mobility goals if consulted  - Reposition patient every 2 hours.   - Dangle patient 3 times a day  - Stand patient 3 times a day  - Ambulate patient 3 times a day  - Out of bed to chair 3 times a day   - Out of bed for meals  - Out of bed for toileting  - Record patient progress and toleration of activity level   Outcome: Progressing     Problem: PAIN - ADULT  Goal: Verbalizes/displays adequate comfort level or baseline comfort level  Description: Interventions:  - Encourage patient to monitor pain and request assistance  - Assess pain using appropriate pain scale  - Administer analgesics based on type and severity of pain and evaluate response  - Implement non-pharmacological measures as appropriate and evaluate response  - Consider cultural and social influences on pain and pain management  - Notify physician/advanced practitioner if interventions unsuccessful or patient reports new pain  Outcome: Progressing     Problem: INFECTION - ADULT  Goal: Absence or prevention of progression during hospitalization  Description: INTERVENTIONS:  - Assess and monitor for signs and symptoms of infection  - Monitor lab/diagnostic results  - Monitor all insertion sites, i.e. indwelling lines, tubes, and drains  - Monitor endotracheal if appropriate and nasal secretions for changes in amount and color  - [place] appropriate cooling/warming therapies per order  - Administer medications as ordered  - Instruct and encourage patient and family to use good hand hygiene technique  - Identify and instruct in appropriate isolation precautions (contact isolation for CRE) for identified infection/condition  Outcome: Progressing     Problem: DISCHARGE PLANNING  Goal: Discharge to home or other facility with appropriate resources  Description: INTERVENTIONS:  - Identify barriers to discharge w/patient  - Arrange for needed discharge resources and transportation as appropriate  - Identify discharge learning needs   - Refer to Case Management Department for coordinating discharge planning if the patient needs post-hospital services based on physician/advanced practitioner order or complex needs related to functional status, cognitive ability, or social support system  Outcome: Progressing     Problem: Nutrition/Hydration-ADULT  Goal: Nutrient/Hydration intake appropriate for improving, restoring or maintaining nutritional needs  Description: Monitor and assess patient's nutrition/hydration status for malnutrition. Collaborate with interdisciplinary team and initiate plan and interventions as ordered. Monitor patient's weight and dietary intake as ordered or per policy. Utilize nutrition screening tool and intervene as necessary. Determine patient's food preferences and provide high-protein, high-caloric foods as appropriate.      INTERVENTIONS:  - Monitor oral intake, urinary output, labs, and treatment plans  - Assess nutrition and hydration status and recommend course of action  - Evaluate amount of meals eaten  - Do not leave patient alone while eating due to aspiration risk  - Assist patient with eating if necessary   - Allow adequate time for meals  - Recommend/ encourage appropriate diets, oral nutritional supplements, and vitamin/mineral supplements  - Order, calculate, and assess calorie counts as needed  - Assess need for intravenous fluids  - Provide specific nutrition/hydration education as appropriate  - Include patient in decisions related to nutrition  Outcome: Progressing     Problem: RESPIRATORY - ADULT  Goal: Achieves optimal ventilation and oxygenation  Description: INTERVENTIONS:  - Assess for changes in respiratory status  - Assess for changes in mentation and behavior  - Position to facilitate oxygenation and minimize respiratory effort  - Oxygen administered by appropriate delivery if ordered   - Encourage broncho-pulmonary hygiene including cough, deep breathe, Incentive Spirometry, flutter valve  - Assess the need for suctioning and aspirate as needed  - Assess and instruct to report SOB or any respiratory difficulty  - Respiratory Therapy support as indicated  Outcome: Progressing     Problem: GENITOURINARY - ADULT  Goal: Maintains or returns to baseline urinary function  Description: INTERVENTIONS:  - Assess urinary function  - Encourage oral fluids to ensure adequate hydration if ordered  - Administer IV fluids as ordered to ensure adequate hydration  - Administer ordered medications as needed  - Offer frequent toileting  - Follow urinary retention protocol if ordered  Outcome: Progressing  Goal: Urinary catheter remains patent  Description: INTERVENTIONS:  - Assess patency of urinary catheter  - Follow guidelines for intermittent irrigation of non-functioning urinary catheter  Outcome: Progressing

## 2023-08-26 NOTE — PROGRESS NOTES
Patient received from ICU. Transferred safely to bed. AAOx4, can be confused at times. IVF D5 NSS running at 50 cc/hr. PEG tube feeding at 30 cc/hr still to titrate to goal rate of 44 cc/hr. Patient maintained on upright position. Dual RN skin check done with charge nurse Kirk Caal RN. R elbow red and blanches. L- elbow with scab, surrounding areas red and blanchable. Bottom with open area unstageable- photos taken, wound nurse consulted. Bilateral heals purple/cyanotic-cold and blanchable- allevyn heel foam dressing re-applied. Skin tear on L forearm, R shin- covered with foam dressing. Scattered ecchymosis on arms and legs. Weight shifting promoted. Safety maintained. Bed alarm on. No further concerns at this time.

## 2023-08-26 NOTE — ASSESSMENT & PLAN NOTE
Wt Readings from Last 3 Encounters:   08/26/23 58.6 kg (129 lb 3 oz)   07/29/23 49.4 kg (108 lb 14.4 oz)   06/30/23 55.3 kg (122 lb)     · We will give 1 dose of Lasix due to increasing oxygen requirement and evidence of fluid overload.   · Will 1 dose of IV albumin prior to help stabilize her BP prior to the lasix

## 2023-08-26 NOTE — PROGRESS NOTES
08/26/23 0000   Patient Belongings at Bedside   Belongings at Bedside Braces; Clothing  (bag with clothes and a pair of shoes.)   Clothing Dress; Footwear   Brace Back     Patient asking for her cell phone and keys, this RN searched her bag but did not find any. Called ICU staff Abhi Michel RN) to verify if patient has a cell phone and keys and was told that she did not have it on admission and was nothing in her previous room as well. Patient can be baseline forgetful.

## 2023-08-26 NOTE — PROGRESS NOTES
08/26/23 0656   Provider Notification   Reason for Communication Review case  (evaluate patient)   Provider Name  Eduardoalissa Campbell   Provider Role Hospitalist   Method of Communication Face to face   Response See orders   Notification Time 0645     On rounds, patient on observable respiratory distress, tachypneic and O2 sat can not be accurately read due to low perfusion. MD Marroquin at the unit- assessed patient. Placed patient on non-rebreather. Respiratory therapist was called as well. Charge nurse also present assisting. Tube feeds held per SLIM. Patient currently placed on midflow at 10lpm/nc. Patient has no urine output. Bladder scan done 77 mL. MD made aware as well. CBC and BMP labs done-awaiting result. Still for COVID swab. Endorsed to AM nurse accordingly. Patient always trying to remove nasal cannula despite constant reminders and education of importance of oxygen therapy. 2 point restraint ordered. Placed on both arms with AM nurse AMY Banks.

## 2023-08-26 NOTE — TREATMENT PLAN
Per discussion with daughter earlier, she is intolerant to ativam  DO NOT GIVE ativan/benzo moving forward.

## 2023-08-26 NOTE — PROGRESS NOTES
[facility] - Progress Note  Dane Madden 70 y.o. female MRN: [id number]  Unit/Bed#: [place] [address] -01 Encounter: [id number]      ASSESSMENT & PLAN:    40-year-old female with history of CVA, HFrEF with EF 30%, dysphagia status post PEG tube placement 8/17/2023, with GI called back in the setting of liver chemistry abnormalities. Liver chemistry abnormalities  • Predominantly transaminase elevation, T. bili normal, alk phos slightly elevated  • Has been uptrending for several days, prior to this no clear history of liver disease  • Acute viral hepatitis panel negative  • DDx includes ischemic hepatitis (most likely in the setting of severely reduced EF and borderline episodes of hypotension), drug-induced liver injury, viral hepatitis, Budd-Chiari syndrome (less likely given anticoagulation)  • We will check abdominal ultrasound with Dopplers, EBV, VZV, HSV, INR  • Trend daily LFTs    Encephalopathy  · Very low suspicion for hepatic encephalopathy  · Suspect encephalopathy in the setting of patient's infections  · Would not recommend checking ammonia levels  · Patient has no asterixis on exam  ______________________________________________________________________    SUBJECTIVE:     Called back by primary team in the setting of transaminase elevations. AST 1604 today, 188 yesterday.  from 204. T. bili normal.  On review, has had some worsening transaminase elevation since 8/22. Has been uptrending since then with some fluctuations. Has had soft BPs throughout admission as well in the last 24-48 hours piyush has been 85/58.     Scheduled Meds:  Current Facility-Administered Medications   Medication Dose Route Frequency Provider Last Rate   • acetaminophen  975 mg Per G Tube Q8H Lianet Marroquin PA-C     • albuterol  2 puff Inhalation Q6H PRN Lianet Marroquin PA-C     • aluminum-magnesium hydroxide-simethicone  30 mL Oral Q4H PRN Lianet Marroquin PA-C     • apixaban 5 mg Per PEG Tube BID Lianet Marroquin PA-C     • buPROPion  50 mg Per G Tube BID Lianet Marroquin PA-C     • dextrose 5 % and sodium chloride 0.9 %  50 mL/hr Intravenous Continuous Lianet Marroquin PA-C 50 mL/hr (08/25/23 1751)   • DULoxetine  60 mg Per G Tube BID Lianet Marroquin PA-C     • gabapentin  100 mg Oral HS Lianet Marroquin PA-C     • HYDROmorphone  0.2 mg Intravenous Q4H PRN Lianet Marroquin PA-C     • hydroxychloroquine  200 mg Per G Tube BID With Meals Lianet Marroquin PA-C     • ipratropium-albuterol  3 mL Nebulization TID Lianet Marroquin PA-C     • lamoTRIgine  50 mg Per G Tube HS Lianet Marroquin PA-C     • lidocaine  1 patch Topical Daily Lianet Marroquin PA-C     • metoprolol tartrate  12.5 mg Per G Tube Q12H [address] Lianet Marroquin PA-C     • omeprazole (PRILOSEC) suspension 2 mg/mL  20 mg Per G Tube Daily Lianet Marroquin PA-C     • ondansetron  4 mg Intravenous Q6H PRN Lianet Marroquin PA-C       Continuous Infusions:dextrose 5 % and sodium chloride 0.9 %, 50 mL/hr, Last Rate: 50 mL/hr (08/25/23 1751)      PRN Meds:.•  albuterol  •  aluminum-magnesium hydroxide-simethicone  •  HYDROmorphone  •  ondansetron    OBJECTIVE:     Objective   Blood pressure 104/68, pulse 91, temperature (!) 96.6 °F (35.9 °C), resp. rate 19, height 5' 4" (1.626 m), weight 58.6 kg (129 lb 3 oz), SpO2 (!) 74 %. Body mass index is 22.18 kg/m².     Intake/Output Summary (Last 24 hours) at 8/26/2023 1052  Last data filed at 8/25/2023 1800  Gross per 24 hour   Intake 772 ml   Output 125 ml   Net 647 ml       PHYSICAL EXAM:   General Appearance: Awake and alert, in moderate distress  Abdomen: Soft, non-tender, non-distended; no masses or no organomegaly  Neurologic: No asterixis on exam    Invasive Devices     Peripheral Intravenous Line  Duration           Long-Dwell Peripheral IV (Midline) 08/18/23 Right Brachial 7 days          Drain  Duration           Gastrostomy/Enterostomy Percutaneous Endoscopic Gastrostomy (PEG) 20 Fr. Umbilicus 8 days    External Urinary Catheter 1 day                LAB RESULTS:      Lab Units 08/26/23  0653 08/25/23  1444 08/25/23  0343 08/24/23  0514 08/23/23  0455 08/22/23  0523 08/21/23  0518 08/20/23  0545 08/16/23  1835 08/16/23  0418 08/15/23  0446 08/14/23  0518   SODIUM mmol/L 146 146 145 144 140   < > 138 137   < > 141   < > 141   POTASSIUM mmol/L 4.1 3.8 2.8* 3.5 4.0   < > 4.7 4.9   < > 4.1   < > 3.6   CHLORIDE mmol/L 114* 112* 111* 108 102   < > 105 105   < > 104   < > 110*   CO2 mmol/L 23 23 29 27 26   < > 30 28   < > 29   < > 27   CO2, I-STAT   --   --   --   --   --    < >  --   --   --   --   --   --    BUN mg/dL 25 21 20 25 29*   < > 21 19   < > 12   < > 12   CREATININE mg/dL 1.10 0.87 0.81 0.90 1.04   < > 0.67 0.82   < > 0.68   < > 0.61   GLUCOSE RANDOM mg/dL 95 96 97 88 89   < > 103 117   < > 91   < > 88   CALCIUM mg/dL 9.0 8.7 8.4 8.4 8.7   < > 8.1* 8.2*   < > 8.0*   < > 7.8*   MAGNESIUM mg/dL  --   --  1.6* 1.7* 1.8*  --  1.7* 1.6*  --  2.0   < > 1.5*   PHOSPHORUS mg/dL  --   --   --  3.9  --   --  2.3 2.6  --  3.9  --  2.3    < > = values in this interval not displayed. Lab Units 08/26/23  6561 08/25/23  0343 08/24/23  0514 08/23/23  0455 08/22/23  0523 07/08/23  0537 06/30/23  1340   TOTAL PROTEIN g/dL 5.5* 5.3* 5.2* 5.6* 5.2*   < > 5.9*   ALBUMIN g/dL 2.4* 2.3* 2.3* 2.5* 2.3*   < > 3.1*   TOTAL BILIRUBIN mg/dL 0.89 0.74 0.71 0.86 0.53   < > 0.64   BILIRUBIN DIRECT mg/dL  --   --  0.25*  --   --   --  0.11   AST U/L 1,604* 188* 383* 275* 103*   < > 29   ALT U/L 723* 204* 215* 115* 37   < > 13   ALK PHOS U/L 107* 70 62 76 81   < > 51    < > = values in this interval not displayed.            Lab Units 08/26/23  0653 08/25/23  0343 08/24/23  1724 08/24/23  0514 08/23/23  0455   WBC Thousand/uL 14.73* 12.60* 13.32* 12.28* 14.82*   HEMOGLOBIN g/dL 9.3* 8.8* 9.1* 6.9* 7.4*   HEMATOCRIT % 31.5* 29.3* 28.6* 23.4* 25.0*   PLATELETS Thousands/uL 212 194 206 188 201   MCV fL 104* 101* 97 105* 106*       Lab Results   Component Value Date    IRON 21 (L) 08/22/2023    TIBC 197 (L) 08/22/2023    FERRITIN 123 08/22/2023       Lab Results   Component Value Date    INR 1.28 (H) 08/17/2023    INR 1.48 (H) 08/16/2023    INR 1.34 (H) 08/10/2023    PROTIME 15.9 (H) 08/17/2023    PROTIME 17.9 (H) 08/16/2023    PROTIME 16.5 (H) 08/10/2023       RADIOLOGY RESULTS:   Procedure: CT abdomen pelvis w contrast    Result Date: 8/24/2023  Narrative: CT ABDOMEN AND PELVIS WITH IV CONTRAST INDICATION:   Retroperitoneal hematoma suspected r/o retroperitoneal bleed. COMPARISON: 8/10/2023 TECHNIQUE:  CT examination of the abdomen and pelvis was performed. Multiplanar 2D reformatted images were created from the source data. This examination, like all CT scans performed in the [facility], was performed utilizing techniques to minimize radiation dose exposure, including the use of iterative reconstruction and automated exposure control. Radiation dose length product (DLP) for this visit:  425 mGy-cm IV Contrast:  100 mL of iohexol (OMNIPAQUE) Enteric Contrast:  Enteric contrast was not administered. FINDINGS: ABDOMEN LOWER CHEST: Bilateral moderate pleural effusions with bilateral subsegmental atelectasis. Left lower lobe consolidation cannot exclude underlying pneumonia or aspiration. Interval development of multiple new pulmonary nodules. The rapid appearance suggests an inflammatory process. Right lower lobe nodule with central calcification is unchanged. Status post bilateral breast implants. LIVER/BILIARY TREE: The liver demonstrates heterogeneous enhancement during the arterial phase. This is nonspecific , but can be seen with infection. GALLBLADDER:  No calcified gallstones. No pericholecystic inflammatory change. Small amount of para cholecystic fluid. SPLEEN:  Unremarkable. PANCREAS:  Unremarkable. ADRENAL GLANDS:  Unremarkable.  KIDNEYS/URETERS: No hydronephrosis or urinary tract calculus. One or more sharply circumscribed subcentimeter renal hypodensities are present, too small to accurately characterize, and statistically most likely benign findings. According to recent literature (Radiology 2019) no further workup of these findings is recommended. STOMACH AND BOWEL: Status post PEG tube. APPENDIX:  No findings to suggest appendicitis. ABDOMINOPELVIC CAVITY: Small amount of fluid within the pelvis and right paracolic gutter. VESSELS:  Unremarkable for patient's age. PELVIS REPRODUCTIVE ORGANS: Surgical changes of prior hysterectomy. URINARY BLADDER:  Unremarkable. ABDOMINAL WALL/INGUINAL REGIONS: Subcutaneous edema noted throughout the abdominal/flank walls. Partially visualized 4.3 x 2.6 cm fluid collection lateral to the left femoral metaphysis. This may represent a small hematoma and is new from the prior study. OSSEOUS STRUCTURES: Status post L1 kyphoplasty. Inferior T12 endplate sclerosis similar to the prior study consistent with a healing fracture. Impression: Moderate bilateral pleural effusions with bibasilar atelectasis and left lower lobe consolidation. Multiple new pulmonary nodules suggesting an inflammatory process given the rapid appearance from the prior study. Heterogeneous enhancement of the liver which is nonspecific but can be seen with infection. Diffuse anasarca with a small volume of abdominal pelvic fluid. 4.3 x 2.6 cm partially imaged fluid collection along the left lateral hip which may represent a hematoma. Workstation performed: [id number]     Procedure: US right upper quadrant    Result Date: 8/24/2023  Narrative: RIGHT UPPER QUADRANT ULTRASOUND INDICATION:     transaminitis. Elevated liver enzymes, clinical concern for DILI.  COMPARISON: CT chest abdomen pelvis 8/10/2023 and 6/30/2023 TECHNIQUE:   Real-time ultrasound of the right upper quadrant was performed with a curvilinear transducer with both volumetric sweeps and still imaging techniques. FINDINGS: PANCREAS:  Visualized portions of the pancreas are within normal limits. AORTA AND IVC:  Visualized portions are normal for patient age. LIVER: Size:  Within normal range. The liver measures 14.0 cm in the midclavicular line. Contour:  Surface contour is smooth. Parenchyma:  Echogenicity and echotexture are within normal limits. No liver mass identified. Limited imaging of the main portal vein shows it to be patent and hepatopetal. BILIARY: The gallbladder is normal in caliber. No wall thickening or pericholecystic fluid. No stones or sludge identified. No sonographic Medrano's sign. No intrahepatic biliary dilatation. CBD measures 5.0 mm. KIDNEY: Right kidney measures 10.1 x 4.5 x 3.8 cm. Volume 89.4 mL Kidney within normal limits. ASCITES:   None. Small simple right pleural effusion, new when compared to previous CTs. Impression: New small simple right pleural effusion when compared to previous CT. Otherwise, unremarkable right upper quadrant ultrasound. Resident: Agustin Aaron, the attending radiologist, have reviewed the images and agree with the final report above. Workstation performed: [id number]     Narrative/Impressions - 3 day look back     Kenji Rose M.D.  PGY-5 Gastroenterology Fellow  [facility]  Available on [email or web address]

## 2023-08-26 NOTE — ASSESSMENT & PLAN NOTE
· Lateral pleural effusion related to chronic congestive  systolic heart failure and hypoalbuminemia. · Due to increasing oxygen requirement evidence of fluid overload, will give Lasix with close monitoring of her blood pressure.

## 2023-08-26 NOTE — ASSESSMENT & PLAN NOTE
· Transaminitis likely related to diminished perfusion from congestive heart failure  · Discussed with GI

## 2023-08-26 NOTE — ASSESSMENT & PLAN NOTE
· Unclear etiology with no active bleeding. · Possibly from prolonged hospitalization and frequent blood draws with small left hip hematoma.   · Hemoglobin is stable at 9.3  · Monitor and transfuse as needed  · Watch for bleeding

## 2023-08-27 PROBLEM — K72.00 ACUTE LIVER FAILURE: Status: ACTIVE | Noted: 2023-08-27

## 2023-08-27 NOTE — PROGRESS NOTES
[facility] - Progress Note  Malaika Vela 70 y.o. female MRN: [id number]  Unit/Bed#: ICU 10 Encounter: [id number]      ASSESSMENT & PLAN:    76-year-old female with HFrEF EF 30%, recent CVA status post thrombectomy, RA, sleep apnea, PEG tube placement 8/17/2023, GI called back to evaluate patient in the setting of significant transaminase elevation. Abnormal liver chemistries  • Component of ischemic hepatopathy given EF of 30% and borderline hypotensive episodes prior to rise in LFTs, however acetaminophen level in the 30s yesterday suggesting multifactorial disease including acetaminophen induced liver injury  • Agree with medical toxicology recommendations regarding continuing NAC for 72-hour total protocol, fomepizole, vitamin K   • Trend acetaminophen levels and LFTs q8 hrs, INR at least daily  • Continue empiric acyclovir  • Discussed case with hepatology team  • Follow-up viral hepatitis studies    ______________________________________________________________________    SUBJECTIVE:     Transferred to ICU yesterday. Acetaminophen level positive, started on NAC. Started on fomepizole per toxicology recommendations. LFTs worsening.     Scheduled Meds:  Current Facility-Administered Medications   Medication Dose Route Frequency Provider Last Rate   • acetylcysteine  100 mg/kg Intravenous Once Ralph Skinner MD 5,860 mg (08/26/23 2230)   • acyclovir  10 mg/kg (Ideal) Intravenous Q12H MATTI CamargoNP 550 mg (08/27/23 8307)   • albuterol  2 puff Inhalation Q6H PRN LAURA Camargo     • aluminum-magnesium hydroxide-simethicone  30 mL Oral Q4H PRN LAURA Camargo     • dextrose 10 % and normal saline infusion  75 mL/hr Intravenous Continuous Nick Alblin CRNP 75 mL/hr (08/27/23 0146)   • DULoxetine  60 mg Per G Tube BID LAURA Camargo     • fomepizole (ANTIZOL) 600 mg in sodium chloride 0.9 % 100 mL IVPB  10 mg/kg Intravenous Q12H Vince Tavera LAURA Stratton 600 mg (08/27/23 1000)    Followed by   • [START ON 8/29/2023] fomepizole (ANTIZOL) 900 mg in sodium chloride 0.9 % 100 mL IVPB  15 mg/kg Intravenous Q12H Roby Boxer, CRNP     • furosemide  20 mg Intravenous Once Roby Boxer, CRNP     • gabapentin  100 mg Oral HS Roby Boxer, CRNP     • ipratropium-albuterol  3 mL Nebulization TID Roby Boxer, CRNP     • lamoTRIgine  50 mg Oral HS Roby Boxer, CRNP     • lidocaine  1 patch Topical Daily Roby Boxer, CRNP     • metoprolol tartrate  12.5 mg Per G Tube Q12H [address] Roby Boxer, CRNP     • omeprazole (PRILOSEC) suspension 2 mg/mL  20 mg Per G Tube Daily Roby Boxer, CRNP     • ondansetron  4 mg Intravenous Q6H PRN Roby Boxer, CRNP     • phytonadione  10 mg Intravenous Once Jamilah Alvarez MD       Continuous Infusions:dextrose 10 % and normal saline infusion, 75 mL/hr, Last Rate: 75 mL/hr (08/27/23 0146)      PRN Meds:.•  albuterol  •  aluminum-magnesium hydroxide-simethicone  •  ondansetron    OBJECTIVE:     Objective   Blood pressure 91/65, pulse 98, temperature 97.8 °F (36.6 °C), temperature source Axillary, resp. rate (!) 25, height 5' 4" (1.626 m), weight 62.1 kg (136 lb 14.5 oz), SpO2 93 %. Body mass index is 23.5 kg/m². Intake/Output Summary (Last 24 hours) at 8/27/2023 1144  Last data filed at 8/27/2023 1815  Gross per 24 hour   Intake 2036.45 ml   Output --   Net 2036.45 ml       PHYSICAL EXAM:   General Appearance: Awake and alert, in no acute distress  Abdomen: Soft, non-tender, non-distended; no masses or no organomegaly    Invasive Devices     Peripheral Intravenous Line  Duration           Long-Dwell Peripheral IV (Midline) 08/18/23 Right Brachial 8 days    Peripheral IV 08/27/23 Left;Upper Arm <1 day    Peripheral IV 08/27/23 Right;Ventral (anterior) Forearm <1 day          Drain  Duration           Gastrostomy/Enterostomy Percutaneous Endoscopic Gastrostomy (PEG) 20 Fr.  Umbilicus 9 days                LAB RESULTS:      Lab Units 08/27/23  9549 08/27/23  0430 08/27/23  0134 08/26/23  2113 08/26/23  2025 08/25/23  1444 08/25/23  0343 08/24/23  0514 08/23/23  0455 08/22/23  0523 08/21/23  0518 08/20/23  0545 08/16/23  1835 08/16/23  0418 08/15/23  0446 08/14/23  0518   SODIUM mmol/L 145 145 145 145 146   < > 145 144 140   < > 138 137   < > 141   < > 141   POTASSIUM mmol/L 3.6 3.8 3.7 4.4 4.2   < > 2.8* 3.5 4.0   < > 4.7 4.9   < > 4.1   < > 3.6   CHLORIDE mmol/L 112* 113* 113* 112* 113*   < > 111* 108 102   < > 105 105   < > 104   < > 110*   CO2 mmol/L 23 23 22 24 23   < > 29 27 26   < > 30 28   < > 29   < > 27   CO2, I-STAT   --   --   --   --   --   --   --   --   --    < >  --   --   --   --   --   --    BUN mg/dL 29* 30* 30* 29* 29*   < > 20 25 29*   < > 21 19   < > 12   < > 12   CREATININE mg/dL 1.35* 1.31* 1.33* 1.26 1.23   < > 0.81 0.90 1.04   < > 0.67 0.82   < > 0.68   < > 0.61   GLUCOSE RANDOM mg/dL 101 116 91 99 96   < > 97 88 89   < > 103 117   < > 91   < > 88   CALCIUM mg/dL 9.3 9.0 8.9 9.0 9.1   < > 8.4 8.4 8.7   < > 8.1* 8.2*   < > 8.0*   < > 7.8*   MAGNESIUM mg/dL  --   --   --   --   --   --  1.6* 1.7* 1.8*  --  1.7* 1.6*  --  2.0   < > 1.5*   PHOSPHORUS mg/dL  --   --   --   --   --   --   --  3.9  --   --  2.3 2.6  --  3.9  --  2.3    < > = values in this interval not displayed.             Lab Units 08/27/23  0835 08/27/23  0430 08/27/23  0134 08/26/23  2113 08/26/23  2025 08/25/23  0343 08/24/23  0514 07/08/23  0537 06/30/23  1340   TOTAL PROTEIN g/dL 5.6* 4.8* 5.0* 5.0* 5.1*   < > 5.2*   < > 5.9*   ALBUMIN g/dL 2.6* 2.2* 2.3* 2.3* 2.4*   < > 2.3*   < > 3.1*   TOTAL BILIRUBIN mg/dL 1.45* 1.29* 1.37* 1.31* 1.35*   < > 0.71   < > 0.64   BILIRUBIN DIRECT mg/dL  --   --   --   --   --   --  0.25*  --  0.11   AST U/L 2,841* 2,700* 2,884* 2,810* 2,818*   < > 383*   < > 29   ALT U/L 1,376* 1,148* 1,156* 1,100* 1,114*   < > 215*   < > 13   ALK PHOS U/L 99 73 75 74 74   < > 62   < > 51    < > = values in this interval not displayed. Lab Units 08/27/23  0430 08/26/23 2025 08/26/23  0653 08/25/23  0343 08/24/23  1724   WBC Thousand/uL 14.49* 16.55* 14.73* 12.60* 13.32*   HEMOGLOBIN g/dL 8.4* 8.5* 9.3* 8.8* 9.1*   HEMATOCRIT % 28.7* 29.1* 31.5* 29.3* 28.6*   PLATELETS Thousands/uL 167 173 212 194 206   MCV fL 105* 104* 104* 101* 97       Lab Results   Component Value Date    IRON 21 (L) 08/22/2023    TIBC 197 (L) 08/22/2023    FERRITIN 123 08/22/2023       Lab Results   Component Value Date    INR 9.44 (HH) 08/27/2023    INR 12.39 (HH) 08/27/2023    INR 11.99 (HH) 08/26/2023    PROTIME 75.2 (H) 08/27/2023    PROTIME 92.9 (H) 08/27/2023    PROTIME 90.5 (H) 08/26/2023       RADIOLOGY RESULTS:   Procedure: XR chest portable    Result Date: 8/27/2023  Narrative: CHEST INDICATION:   Coarse lung sounds. COMPARISON: CXR 8/22/2023 and chest CT 8/10/2023. EXAM PERFORMED/VIEWS:  XR CHEST PORTABLE. FINDINGS: Mild cardiomegaly. Left greater than right effusions and bibasilar atelectasis. No pneumothorax. Biopsy clip left breast. Upper abdomen normal. Vertebral augmentation. Impression: Left greater than right pleural effusions with bibasilar atelectasis. Pneumonia not excluded in the appropriate clinical setting. Workstation performed: [id number]     Procedure: CT head wo contrast    Result Date: 8/26/2023  Narrative: CT BRAIN - WITHOUT CONTRAST INDICATION:   Mental status change, unknown cause change in MS, elevated INR. COMPARISON: CT brain dated August 10, 2023. TECHNIQUE:  CT examination of the brain was performed. Multiplanar 2D reformatted images were created from the source data. Radiation dose length product (DLP) for this visit:  816 mGy-cm . This examination, like all CT scans performed in the [facility], was performed utilizing techniques to minimize radiation dose exposure, including the use of iterative reconstruction and automated exposure control. IMAGE QUALITY:  Diagnostic.  FINDINGS: PARENCHYMA:  No intracranial mass, mass effect or midline shift. No CT signs of acute infarction. No acute parenchymal hemorrhage. Stable chronic right MCA infarct with encephalomalacia in the right temporal and parietal lobes, right insula and right basal ganglia. No acute infarct, hemorrhage, mass or mass effect. Mild chronic microangiopathy. VENTRICLES AND EXTRA-AXIAL SPACES:  Normal for the patient's age. VISUALIZED ORBITS: Normal visualized orbits. PARANASAL SINUSES: Normal visualized paranasal sinuses. CALVARIUM AND EXTRACRANIAL SOFT TISSUES:  Normal.     Impression: No acute intracranial abnormality. Stable chronic right MCA infarct. Workstation performed: [id number]     Procedure: US abdomen complete with doppler    Result Date: 8/26/2023  Narrative: ABDOMEN ULTRASOUND, COMPLETE WITH DOPPLER INDICATION:    transaminitis, acute . COMPARISON: Ultrasound 8/23/2023, 8/24/2023. TECHNIQUE:   Real-time ultrasound of the abdomen was performed with a curvilinear transducer with both volumetric sweeps and still imaging techniques. FINDINGS: PANCREAS:  Visualized portions of the pancreas are within normal limits. AORTA AND IVC:  Visualized portions are normal for patient age. LIVER: Size:  Within normal range. The liver measures 13.0 cm in the midclavicular line. Contour:  Surface contour is smooth. Parenchyma:  Echogenicity and echotexture are within normal limits. No liver mass identified. LIVER DOPPLER: The main portal vein and primary branch segments are patent and hepatopetal with normal spectral waveform. Hepatic veins are patent. Spectral waveforms within normal limits. Main hepatic artery appears normal size, patent with normal spectral waveform. BILIARY: No gallbladder findings. No intrahepatic biliary dilatation. CBD measures 6.0 mm. No choledocholithiasis. KIDNEY: Right kidney measures 8.1 x 3.7 x 3.7 cm. Volume 57.5 mL Left kidney was unable to be visualized. SPLEEN: Measures 10.3 x 9.5 x 4.1 cm. Volume 209.6 mL Within normal limits. ASCITES: Small volume of ascites is present. Impression: Small volume of ascites. Unable to visualize left kidney. Workstation performed: [id number]     Narrative/Impressions - 3 day look back     Swathi Arndt M.D.  PGY-5 Gastroenterology Fellow  [facility]. [facility]  Available on [email or web address]

## 2023-08-27 NOTE — PLAN OF CARE
Problem: Potential for Falls  Goal: Patient will remain free of falls  Description: INTERVENTIONS:  - Educate patient/family on patient safety including physical limitations  - Instruct patient to call for assistance with activity   - Consult OT/PT to assist with strengthening/mobility   - Keep Call bell within reach  - Keep bed low and locked with side rails adjusted as appropriate  - Keep care items and personal belongings within reach  - Initiate and maintain comfort rounds  - Make Fall Risk Sign visible to staff  - Offer Toileting every 2 Hours, in advance of need  - Initiate/Maintain bed/chair alarm  - Obtain necessary fall risk management equipment: bed/chair alarm, call bell, gripper socks, walker, bedside commode  - Apply yellow socks and bracelet for high fall risk patients  - Consider moving patient to room near nurses station  Outcome: Progressing     Problem: MOBILITY - ADULT  Goal: Maintain or return to baseline ADL function  Description: INTERVENTIONS:  - Educate patient/family on patient safety including physical limitations  - Instruct patient to call for assistance with activity   - Consult OT/PT to assist with strengthening/mobility   - Keep Call bell within reach  - Keep bed low and locked with side rails adjusted as appropriate  - Keep care items and personal belongings within reach  - Initiate and maintain comfort rounds  - Make Fall Risk Sign visible to staff  - Offer Toileting every 2 Hours, in advance of need  - Initiate/Maintain bed/chair alarm  - Obtain necessary fall risk management equipment: bed/chair alarm, call bell, gripper socks, walker, bedside commode  - Apply yellow socks and bracelet for high fall risk patients  - Consider moving patient to room near nurses station  Outcome: Progressing  Goal: Maintains/Returns to pre admission functional level  Description: INTERVENTIONS:  - Perform BMAT or MOVE assessment daily.   - Set and communicate daily mobility goal to care team and patient. - Collaborate with rehabilitation services on mobility goals if consulted  - Reposition patient every 2 hours.   - Dangle patient 3 times a day  - Stand patient 3 times a day  - Ambulate patient 3 times a day  - Out of bed to chair 3 times a day   - Out of bed for meals  - Out of bed for toileting  - Record patient progress and toleration of activity level   Outcome: Progressing     Problem: PAIN - ADULT  Goal: Verbalizes/displays adequate comfort level or baseline comfort level  Description: Interventions:  - Encourage patient to monitor pain and request assistance  - Assess pain using appropriate pain scale  - Administer analgesics based on type and severity of pain and evaluate response  - Implement non-pharmacological measures as appropriate and evaluate response  - Consider cultural and social influences on pain and pain management  - Notify physician/advanced practitioner if interventions unsuccessful or patient reports new pain  Outcome: Progressing     Problem: INFECTION - ADULT  Goal: Absence or prevention of progression during hospitalization  Description: INTERVENTIONS:  - Assess and monitor for signs and symptoms of infection  - Monitor lab/diagnostic results  - Monitor all insertion sites, i.e. indwelling lines, tubes, and drains  - Monitor endotracheal if appropriate and nasal secretions for changes in amount and color  - Sterling appropriate cooling/warming therapies per order  - Administer medications as ordered  - Instruct and encourage patient and family to use good hand hygiene technique  - Identify and instruct in appropriate isolation precautions (contact isolation for CRE) for identified infection/condition  Outcome: Progressing     Problem: SAFETY ADULT  Goal: Patient will remain free of falls  Description: INTERVENTIONS:  - Educate patient/family on patient safety including physical limitations  - Instruct patient to call for assistance with activity   - Consult OT/PT to assist with strengthening/mobility   - Keep Call bell within reach  - Keep bed low and locked with side rails adjusted as appropriate  - Keep care items and personal belongings within reach  - Initiate and maintain comfort rounds  - Make Fall Risk Sign visible to staff  - Offer Toileting every 2 Hours, in advance of need  - Initiate/Maintain bed/chair alarm  - Obtain necessary fall risk management equipment: bed/chair alarm, call bell, gripper socks, walker, bedside commode  - Apply yellow socks and bracelet for high fall risk patients  - Consider moving patient to room near nurses station  Outcome: Progressing  Goal: Maintain or return to baseline ADL function  Description: INTERVENTIONS:  - Educate patient/family on patient safety including physical limitations  - Instruct patient to call for assistance with activity   - Consult OT/PT to assist with strengthening/mobility   - Keep Call bell within reach  - Keep bed low and locked with side rails adjusted as appropriate  - Keep care items and personal belongings within reach  - Initiate and maintain comfort rounds  - Make Fall Risk Sign visible to staff  - Offer Toileting every 2 Hours, in advance of need  - Initiate/Maintain bed/chair alarm  - Obtain necessary fall risk management equipment: bed/chair alarm, call bell, gripper socks, walker, bedside commode  - Apply yellow socks and bracelet for high fall risk patients  - Consider moving patient to room near nurses station  Outcome: Progressing  Goal: Maintains/Returns to pre admission functional level  Description: INTERVENTIONS:  - Perform BMAT or MOVE assessment daily.   - Set and communicate daily mobility goal to care team and patient. - Collaborate with rehabilitation services on mobility goals if consulted  - Reposition patient every 2 hours.   - Dangle patient 3 times a day  - Stand patient 3 times a day  - Ambulate patient 3 times a day  - Out of bed to chair 3 times a day   - Out of bed for meals  - Out of bed for toileting  - Record patient progress and toleration of activity level   Outcome: Progressing     Problem: DISCHARGE PLANNING  Goal: Discharge to home or other facility with appropriate resources  Description: INTERVENTIONS:  - Identify barriers to discharge w/patient  - Arrange for needed discharge resources and transportation as appropriate  - Identify discharge learning needs   - Refer to Case Management Department for coordinating discharge planning if the patient needs post-hospital services based on physician/advanced practitioner order or complex needs related to functional status, cognitive ability, or social support system  Outcome: Progressing     Problem: Knowledge Deficit  Goal: Patient/family/caregiver demonstrates understanding of disease process, treatment plan, medications, and discharge instructions  Description: Complete learning assessment and assess knowledge base.   Interventions:  - Provide teaching at level of understanding  - Provide teaching via preferred learning methods  Outcome: Progressing     Problem: Prexisting or High Potential for Compromised Skin Integrity  Goal: Skin integrity is maintained or improved  Description: INTERVENTIONS:  - Identify patients at risk for skin breakdown  - Assess and monitor skin integrity  - Assess and monitor nutrition and hydration status  - Monitor labs   - Assess for incontinence   - Turn and reposition patient  - Assist with mobility/ambulation  - Relieve pressure over bony prominences  - Avoid friction and shearing  - Provide appropriate hygiene as needed including keeping skin clean and dry  - Evaluate need for skin moisturizer/barrier cream  - Collaborate with interdisciplinary team   - Patient/family teaching  - Consider wound care consult   Outcome: Progressing     Problem: Nutrition/Hydration-ADULT  Goal: Nutrient/Hydration intake appropriate for improving, restoring or maintaining nutritional needs  Description: Monitor and assess patient's nutrition/hydration status for malnutrition. Collaborate with interdisciplinary team and initiate plan and interventions as ordered. Monitor patient's weight and dietary intake as ordered or per policy. Utilize nutrition screening tool and intervene as necessary. Determine patient's food preferences and provide high-protein, high-caloric foods as appropriate.      INTERVENTIONS:  - Monitor oral intake, urinary output, labs, and treatment plans  - Assess nutrition and hydration status and recommend course of action  - Evaluate amount of meals eaten  - Do not leave patient alone while eating due to aspiration risk  - Assist patient with eating if necessary   - Allow adequate time for meals  - Recommend/ encourage appropriate diets, oral nutritional supplements, and vitamin/mineral supplements  - Order, calculate, and assess calorie counts as needed  - Assess need for intravenous fluids  - Provide specific nutrition/hydration education as appropriate  - Include patient in decisions related to nutrition  Outcome: Progressing     Problem: RESPIRATORY - ADULT  Goal: Achieves optimal ventilation and oxygenation  Description: INTERVENTIONS:  - Assess for changes in respiratory status  - Assess for changes in mentation and behavior  - Position to facilitate oxygenation and minimize respiratory effort  - Oxygen administered by appropriate delivery if ordered   - Encourage broncho-pulmonary hygiene including cough, deep breathe, Incentive Spirometry, flutter valve  - Assess the need for suctioning and aspirate as needed  - Assess and instruct to report SOB or any respiratory difficulty  - Respiratory Therapy support as indicated  Outcome: Progressing     Problem: GENITOURINARY - ADULT  Goal: Maintains or returns to baseline urinary function  Description: INTERVENTIONS:  - Assess urinary function  - Encourage oral fluids to ensure adequate hydration if ordered  - Administer IV fluids as ordered to ensure adequate hydration  - Administer ordered medications as needed  - Offer frequent toileting  - Follow urinary retention protocol if ordered  Outcome: Progressing  Goal: Urinary catheter remains patent  Description: INTERVENTIONS:  - Assess patency of urinary catheter  - Follow guidelines for intermittent irrigation of non-functioning urinary catheter  Outcome: Progressing     Problem: SAFETY,RESTRAINT: NV/NON-SELF DESTRUCTIVE BEHAVIOR  Goal: Remains free of harm/injury (restraint for non violent/non self-detsructive behavior)  Description: INTERVENTIONS:  - Instruct patient/family regarding restraint use   - Assess and monitor physiologic and psychological status   - Provide interventions and comfort measures to meet assessed patient needs   - Identify and implement measures to help patient regain control  - Assess readiness for release of restraint   Outcome: Progressing  Goal: Returns to optimal restraint-free functioning  Description: INTERVENTIONS:  - Assess the patient's behavior and symptoms that indicate continued need for restraint  - Identify and implement measures to help patient regain control  - Assess readiness for release of restraint   Outcome: Progressing     Problem: NEUROSENSORY - ADULT  Goal: Achieves stable or improved neurological status  Description: INTERVENTIONS  - Monitor and report changes in neurological status  - Monitor vital signs such as temperature, blood pressure, glucose, and any other labs ordered   - Initiate measures to prevent increased intracranial pressure  - Monitor for seizure activity and implement precautions if appropriate      Outcome: Progressing  Goal: Achieves maximal functionality and self care  Description: INTERVENTIONS  - Monitor swallowing and airway patency with patient fatigue and changes in neurological status  - Encourage and assist patient to increase activity and self care.    - Encourage visually impaired, hearing impaired and aphasic patients to use assistive/communication devices  Outcome: Progressing     Problem: GASTROINTESTINAL - ADULT  Goal: Maintains adequate nutritional intake  Description: INTERVENTIONS:  - Monitor percentage of each meal consumed  - Identify factors contributing to decreased intake, treat as appropriate  - Assist with meals as needed  - Monitor I&O, weight, and lab values if indicated  - Obtain nutrition services referral as needed  Outcome: Progressing  Goal: Establish and maintain optimal ostomy function  Description: INTERVENTIONS:  - Assess bowel function  - Encourage oral fluids to ensure adequate hydration  - Administer IV fluids if ordered to ensure adequate hydration   - Administer ordered medications as needed  - Encourage mobilization and activity  - Nutrition services referral to assist patient with appropriate food choices  - Assess stoma site  - Consider wound care consult   Outcome: Progressing     Problem: METABOLIC, FLUID AND ELECTROLYTES - ADULT  Goal: Electrolytes maintained within normal limits  Description: INTERVENTIONS:  - Monitor labs and assess patient for signs and symptoms of electrolyte imbalances  - Administer electrolyte replacement as ordered  - Monitor response to electrolyte replacements, including repeat lab results as appropriate  - Instruct patient on fluid and nutrition as appropriate  Outcome: Progressing  Goal: Fluid balance maintained  Description: INTERVENTIONS:  - Monitor labs   - Monitor I/O and WT  - Instruct patient on fluid and nutrition as appropriate  - Assess for signs & symptoms of volume excess or deficit  Outcome: Progressing  Goal: Glucose maintained within target range  Description: INTERVENTIONS:  - Monitor Blood Glucose as ordered  - Assess for signs and symptoms of hyperglycemia and hypoglycemia  - Administer ordered medications to maintain glucose within target range  - Assess nutritional intake and initiate nutrition service referral as needed  Outcome: Progressing     Problem: SKIN/TISSUE INTEGRITY - ADULT  Goal: Skin Integrity remains intact(Skin Breakdown Prevention)  Description: Assess:  -Perform Paul assessment every   -Clean and moisturize skin every   -Inspect skin when repositioning, toileting, and assisting with ADLS  -Assess under medical devices such as  every   -Assess extremities for adequate circulation and sensation     Bed Management:  -Have minimal linens on bed & keep smooth, unwrinkled  -Change linens as needed when moist or perspiring  -Avoid sitting or lying in one position for more than  hours while in bed  -Keep HOB at degrees     Toileting:  -Offer bedside commode  -Assess for incontinence every   -Use incontinent care products after each incontinent episode such as     Activity:  -Mobilize patient  times a day  -Encourage activity and walks on unit  -Encourage or provide ROM exercises   -Turn and reposition patient every  Hours  -Use appropriate equipment to lift or move patient in bed  -Instruct/ Assist with weight shifting every  when out of bed in chair  -Consider limitation of chair time  hour intervals    Skin Care:  -Avoid use of baby powder, tape, friction and shearing, hot water or constrictive clothing  -Relieve pressure over bony prominences using   -Do not massage red bony areas    Next Steps:  -Teach patient strategies to minimize risks such as    -Consider consults to  interdisciplinary teams such as   Outcome: Progressing  Goal: Incision(s), wounds(s) or drain site(s) healing without S/S of infection  Description: INTERVENTIONS  - Assess and document dressing, incision, wound bed, drain sites and surrounding tissue  - Provide patient and family education  - Perform skin care/dressing changes every   Outcome: Progressing     Problem: HEMATOLOGIC - ADULT  Goal: Maintains hematologic stability  Description: INTERVENTIONS  - Assess for signs and symptoms of bleeding or hemorrhage  - Monitor labs  - Administer supportive blood products/factors as ordered and appropriate  Outcome: Progressing

## 2023-08-27 NOTE — ASSESSMENT & PLAN NOTE
· Noted to be more altered on 8/26 with eye opening to name but not verbal, not following commands  · 8/27 Mental status wavering this morning  · Ammonia level was normal blood sugar was normal head cT without acute abnormality  · Liver enzymes elevated, no asterixis on exam    Plan:  · Continue monitoring fingerstick blood glucose every 2-3 hours- high risk for hypoglycemia  · Hypoglycemia protocol  · Continue D10w for now  · Frequent neurochecks

## 2023-08-27 NOTE — ASSESSMENT & PLAN NOTE
Patient has a history of low impact fall that resulted in multiple T-spine fractures  Currently pain controlled by morphine    Plan:  · On gabapentin  · Has had low dose dilaudid but was noted to have a decreased mental status  · She is on narcotics chronically, but has been hospitalized for about 2 weeks with variable use  · Would reintroduce them slowly at lower doses if need

## 2023-08-27 NOTE — ASSESSMENT & PLAN NOTE
Wt Readings from Last 3 Encounters:   08/26/23 58.6 kg (129 lb 3 oz)   07/29/23 49.4 kg (108 lb 14.4 oz)   06/30/23 55.3 kg (122 lb)     -Hold further diuresis  -monitor I/o's  -ef 30%/ mod AR  -cont BB for now.  Hold if hypotension or bradycardia  -May be a contributing factor in her acute liver failure

## 2023-08-27 NOTE — ASSESSMENT & PLAN NOTE
· Patient has a history of seronegative multiple joint arthropathy  · Currently on hydroxychloroquine 200 mg twice daily  · Patient follows up with rheumatologist    Plan:  · We are going to hold her plaquenil for now.  Though she has been on it for a while, it can be associated with fulminant liver failure

## 2023-08-27 NOTE — ASSESSMENT & PLAN NOTE
· no ativan  · On wellbutrin and duloxetine  · Wellbutrin was stopped secondary to hepatic toxicity risk  · cont gabapentin and lamictal. Lower dose started 2/2 sedation and uptitrate as able.   · Lamictal is also associated with hepatic toxicity but given her seizure risk, we will continue this for now

## 2023-08-27 NOTE — ASSESSMENT & PLAN NOTE
Recent Labs     08/25/23  0343 08/26/23  0653 08/26/23 2025   HGB 8.8* 9.3* 8.5*     Hgb has been stable overt the last few days  No signs or symptoms of GI bleed  Haptoglobin and LDH wnl  Trend CBC, transfuse for Hgb < 7

## 2023-08-27 NOTE — ASSESSMENT & PLAN NOTE
· Patient had right MCA stroke, s/p mechanical thrombectomy earlier this year  · Although the patient moves all the limbs, there is residual weakness in the left upper and lower limb  · CT head on August 8 shows No acute intracranial abnormality    Plan:  · Hold Eliquis for now given elevated INR in the setting of her liver failure  · Frequent neurochecks

## 2023-08-27 NOTE — ASSESSMENT & PLAN NOTE
· Patient was brought to the ED after being in altered mental status, requiring more oxygen.    · History of multiple episodes of vomiting at night prior to admission  · Initially admitted with aspiration pneumonia, treated with a course of antibiotics and improved  · Remained on oxygen therapy, but developed a change in MS and required transfer back to the ICU  · She was noted to have an increase WOB and was placed on BIPAP  · This current episode does not appear to be related to an acute lung issue but more likely related to acute hepatic failure    Plan:  · Continue supplementary oxygen to maintain O2 sats above 90%  · Prn BIPAP  · Our goal is to maintain her oxygen saturation > 90%  · If worsening resp status will require ett placement

## 2023-08-27 NOTE — CONSULTS
INTERPROFESSIONAL (PHONE) [id number] [place] [facility]  Ferdinand Cobian 70 y.o. female MRN: [id number]  Unit/Bed#: 1575 [address] [place] 215-01 Encounter: [id number]       Reason for Consult / Principal Problem: Transaminitis    Inpatient consult to Toxicology  Consult performed by: Blanco Morales MD  Consult ordered by: Debbie Rosas MD        08/26/23    ASSESSMENT:  Acute liver injury  Detectable acetaminophen level  Toxic metabolic encephalopathy  CHF  Respiratory failure    RECOMMENDATIONS:  Patient has demonstrated mild LFT elevation in the last several days with a larger increase in LFTs this morning. She has been receiving acetaminophen 975 mg every 8 hours scheduled for several days with last dose this morning at 0816. I suspect her initial detectable level of 34 at 1350 is post-absorption and from her morning dose. However, she is demonstrating delayed metabolism because a repeat level 3.5 hours later is not as low as would be expected. Therefore, it is possible acetaminophen is contributing to her liver injury. Recommend continuing to evaluate for additional causes in addition to the following:    Continue N-acetylcysteine protocol until LFTs have clearly peaked and down-trended twice. Initiate fomepizole given delayed metabolism of acetaminophen. 15 mg/kg bolus followed by 10 mg/kg every 12 hours until discontinued by medical toxicology. Check CPK level to assess for rhabdomyolysis as a potential cause. Monitor CMP, INR, and acetaminophen level every 8 hours. Medical toxicology will continue to follow. For further questions, please contact the medical  on call via [place] Text or throughl the [employer] or [employer].      Please see additional teaching note below:    Medical Toxicology Teaching Note  [facility]’s [address] [facility]  Acetaminophen Toxicity  Last revised October 2017     Acetaminophen (Tylenol) is a nonopiod analgesic and antipyretic medication found in many over-the-counter and prescription products such as Tylenol PM, Norco, Percocet, Nyquil, Vicks Formula 44-D. The recommended maximum daily dose of acetaminophen for adults is 3g/day, and 75-90mg/kg/day for children. Alcoholics may safely take Tylenol in therapeutic doses, but they may be at increased risk for hepatotoxicity in overdose. Mechanism of Toxicity: Acetaminophen is primarily metabolized by the liver. In therapeutic doses, about 90% of acetaminophen is conjugated to nontoxic metabolites (glucoronides and sulfates). A small portion (<5%) is conjugated by cytochrome P450 enzyme, subunit CYP2E1, to a toxic metabolite, N-acetyl-p-benzoquinoneimine (NAPQI). This metabolite is further conjugated by glutathione, to nontoxic metabolites eliminated by the kidneys. Liver Injury:  In toxic doses, the usual metabolic pathways are overwhelmed; acetaminophen is shunted to the cytochrome P450 pathway, creating NAPQI. Glutathione stores are depleted and NAPQI is produced. Cellular injury and hepatic necrosis may occur as NAPQI accumulates. Renal Injury:  Cytochrome P450 activity in the kidneys is thought to cause direct renal damage. Renal insufficiency may also develop during fulminant hepatic failure due to hepatorenal syndrome. Renal toxicity is usually associated with liver injury. Pharmacokinetics:  Acetaminophen is rapidly absorbed. Peak levels occur within  minutes with normal doses. Delayed absorption may occur with sustained release products or with co-ingestions that slow the GI tract (opiods, anticholinergics). The elimination half-life is 1-3 hours after therapeutic doses and may extend to 12 hours after overdose. Toxic Dose:  Toxicity in adults may occur with acute ingestions of 7g, and 200mg/kg in children. Hepatic injury following chronic ingestions may occur at any dose above the daily recommended dose.      Clinical Presentation: Acute Ingestion: Within 8 hrs of an acute ingestion, there are usually few symptoms. Between 8-30 hours after a toxic, acute ingestion, a transaminitis will develop. Nausea, vomiting, and right upper quadrant pain may occur. Within 12-36 hours, worsening AST/ALT develops with elevated bilirubin and INR. The most severe cases will develop fulminant liver failure with hepatic encephalopathy and acidosis, usually within 3-7 days post overdose. The patient should be evaluated for a liver transplantation. Repeated Supra-therapeutic Ingestion: Due to a sub-acute course, patients may present anywhere along a spectrum - normal LFTS to asymptomatic elevation of enzymes to hepatic failure. Diagnosis   Acute Ingestion (Time of Ingestion Known): After an acute ingestion at a known time, obtain a 4-hour post-ingestion serum acetaminophen level and plot the level on the Rumricki-Andres’s nomogram (see below). This nomogram is used to predict the likelihood of hepatic toxicity based on the level of acetaminophen between 4 and 24 hours post-ingestion. The nomogram CANNOT be used if the time of ingestion is unknown. The dotted line (Rumack-Andres line), marking a 4-hour level at 200 mcg/ml, is the original line developed from the study above which hepatic toxicity will probably occur. The solid line (Treatment Line), marking a 4-hour level at 150ug/ml. is the treatment line accepted as the standard of care in the [facility] and is 25% lower as a safety margin. If the patient’s serum APAP level falls above the treatment line, start treatment with N-acetylcysteine (NAC). (see Treatment below)           Acute Ingestion (Time of Ingestion Unknown) or Repeated Supra-therapeutic Ingestion An acetaminophen level CANNOT be plotted on the Rumricki-Andres’s nomogram. Draw an APAP level and AST/ALT at time of presentation. Anyone with an APAP level> 10mcg/ml OR elevated AST/ALT should start NAC.  (see Treatment below)     TREATMENT Emergency and Supportive Care: Treat nausea and vomiting to protect airway and support safe administration of charcoal and NAC, when indicated (see below). Provide standard supportive care for liver and renal failure. Contact liver transplant team if fulminant hepatic failure occurs. Decontamination:  Administer activated charcoal within 2 hours of ingestion (consider later if extended release preparations). Use antiemetics for nausea. Activated charcoal does bind to NAC, but the effect is not thought to be clinically significant. Gastric emptying is not recommended. Specific Drugs and Antidotes. Acute Ingestion Treat with NAC if the APAP level falls above the Treatment Line on the nomogram. The maximal benefit occurs if given within 8 hours of acute ingestion. Therefore, it is recommended to empirically start NAC before a level is obtained if there is a reasonable concern of a toxic ingestion presenting close to 8 hours or beyond. In late presenters (>8hrs), start NAC and treat for a full course or longer if LFTS remain abnormal. Treatment maybe stopped when AST/ALT peak and then downtrend, with an INR <2 and patient is clinically well. If abnormal labs persist, continue NAC and call Toxicology. There are two routes of administration for NAC, oral and IV. The treatment protocols are described below. Acute Ingestion (Time of Ingestion Unknown) or Repeated Supra-therapeutic Ingestion   The nomogram CANNOT be used to estimate the risk of hepatotoxicity. At presentation, check a serum APAP level and AST/ALT. If the APAP level is above 10 mcg/ml or the AST/ALT are elevated, start NAC treatment for 12 hours. If abnormalities persist, continue NAC treatment and call toxicology. If the APAP level is undetectable and AST and ALT are downtrending at the end of 12 hours, treatment may be stopped.      Intravenous (Acetadote)   Loading dose- 150mg/kg infused over 15-60 minutes   Maintenance Infusion #1- 50mg/kg (12.5mg/kg/hr) over 4 hours   Maintenance Infusion #2 -100mg/kg (6.25 mg/kg/hr) until treatment endpoint   Treatment Endpoint: 20 hours or more   NAC should be continued for the full course. NAC can be stopped when APAP is undetectable, AST/ALT have peaked and are downtrending, and patient appears clinically well. Consultation with a medical  [facility] is recommended before changes in the duration of therapy are made. Acetaminophen Toxicity Do’s and Don’ts   Acute Ingestions   DO give charcoal for decontamination within 2 hours of ingestion if the patient can adequately protect their airway. DO start NAC empirically, i.e. without an APAP level, if the ingestion is likely a large overdose presenting at 8 hours or more after ingestion. DO contact the Liver Transplant Team early if liver failure is developing. DO NOT get a level before 4hrs post-ingestion if the time of ingestion is certain in an acute overdose. DO NOT stop NAC therapy until full course is finished or truncated therapy is recommended by the [facility]. Repeated Supra-Therapeutic Ingestions (RSI)   DO ask patients with pain complaints (toothaches, back pain, cancer) about the amount of acetaminophen they use. DO NOT use the Rumack-Crouch nomogram to determine if the APAP level is toxic. DO NOT stop NAC therapy until full course is finished or truncated therapy is recommended by the [facility]. NAC Protocols   DO stop IV NAC if an anaphylactoid reaction occurs (rare). Treat the reaction appropriately and call the [facility] for recommendations on continued NAC therapy. DO give charcoal with oral NAC when charcoal is indicated. References   Bernadette JAEGER Acetaminophen. In Blake Villeda EM, [address]. Medical Toxicology 3rd edition. [place]: [facility], 2004: pp.723-754. Joy STARK Acetaminophen. In Luis Miguel Rice and PE limited by the dynamics of a phone consultation. I have not personally interviewed or evaluated the patient, but only advised based on the information provided to me. Primary provider is responsible for all clinical decisions. Pertinent history, physical exam and clinical findings and course discussed: Reese De Dios is a 70y.o. year old female who has been admitted for 16 days but demonstrates worsening acute liver injury. Review of systems and physical exam not performed by me. Historical Information   Past Medical History:   Diagnosis Date   • Anxiety    • Asthma    • Bipolar depression ([address])    • Chronic narcotic dependence ([address])    • Chronic pain    • Depression    • Enterovirus heart infection    • SHAR (obstructive sleep apnea)    • Osteoarthritis    • Peripheral neuropathy    • Plantar fasciitis of right foot    • Senile osteoporosis    • Seronegative arthropathy of multiple sites ([address])    • Undifferentiated connective tissue disease (HCC)      Past Surgical History:   Procedure Laterality Date   • BREAST IMPLANT     • FIXATION KYPHOPLASTY LUMBAR SPINE     • HYSTERECTOMY     • INCONTINENCE SURGERY N/A    • IR STROKE ALERT  2/12/2023   • NASAL SEPTOPLASTY W/ TURBINOPLASTY N/A    • RECTAL PROLAPSE REPAIR N/A    • REPAIR RECTOCELE     • RHINOPLASTY N/A      Social History   Social History     Substance and Sexual Activity   Alcohol Use Not Currently    Comment: quit     Social History     Substance and Sexual Activity   Drug Use Not Currently   • Types: Marijuana    Comment: has medical card     Social History     Tobacco Use   Smoking Status Former   Smokeless Tobacco Never     Family History   Problem Relation Age of Onset   • No Known Problems Mother    • No Known Problems Father    • Arthritis Family    • Diabetes Family    • Stroke Family         Prior to Admission medications    Medication Sig Start Date End Date Taking?  Authorizing Provider   acetaminophen (TYLENOL) 325 mg tablet Take 2 tablets (650 mg total) by mouth every 6 (six) hours 7/7/23   Carolyn Elizalde PA-C   albuterol (PROVENTIL HFA,VENTOLIN HFA) 90 mcg/act inhaler Inhale 2 puffs every 6 (six) hours as needed for wheezing 4/10/23   Josette Nissen, MD   apixaban (ELIQUIS) 5 mg Take 1 tablet (5 mg total) by mouth 2 (two) times a day 7/28/23   Nancy Cueto MD   buPROPion [facility]) 100 mg tablet Take 1 tablet (100 mg total) by mouth 2 (two) times a day 7/28/23   Nancy Cueto MD   cinacalcet (SENSIPAR) 30 mg tablet Take 1 tablet (30 mg total) by mouth daily with breakfast 7/28/23   Nancy Cueto MD   Diclofenac Sodium (VOLTAREN) 1 % Apply 2 g topically 4 (four) times a day as needed (Aching/joint pain) To neck/R shoulder.  7/28/23   Nancy Cueto MD   docusate sodium (COLACE) 100 mg capsule Take 1 capsule (100 mg total) by mouth 2 (two) times a day 7/28/23   Nancy Cueto MD   DULoxetine (CYMBALTA) 60 mg delayed release capsule Take 1 capsule (60 mg total) by mouth 2 (two) times a day 7/28/23   Nancy Cueto MD   gabapentin (NEURONTIN) 300 mg capsule Take 1 capsule (300 mg total) by mouth 2 (two) times a day 7/28/23   Nancy Cueto MD   hydroxychloroquine (PLAQUENIL) 200 mg tablet Take 1 tablet (200 mg total) by mouth 2 (two) times a day with meals 7/28/23 1/24/24  Nancy Cueto MD   lamoTRIgine (LaMICtal) 150 MG tablet Take 1 tablet (150 mg total) by mouth daily at bedtime pt taking 150mg daily at bedtime 7/28/23   Nancy Cueto MD   lidocaine (LIDODERM) 5 % Apply 2 patches topically over 12 hours daily at bedtime On for 12 hours, Off for 12 hours - To L outer hip and or back 7/28/23   Nancy Cueto MD   magnesium oxide (MAG-OX) 400 mg tablet Take 1 tablet (400 mg total) by mouth 2 (two) times a day 7/28/23   Nancy Cueto MD   melatonin 3 mg Take 1 tablet (3 mg total) by mouth daily at bedtime 7/28/23   Nancy Cueto MD   metoprolol succinate (TOPROL-XL) 25 mg 24 hr tablet Take 1 tablet (25 mg total) by mouth daily 7/28/23 1/18/25  Nancy Cueto MD morphine (MSIR) 15 mg tablet Take 0.5 tablets (7.5 mg total) by mouth 2 (two) times a day as needed for severe pain or moderate pain Max Daily Amount: 15 mg 7/28/23   Tristan Peng MD   pantoprazole (PROTONIX) 40 mg tablet Take 1 tablet (40 mg total) by mouth daily in the early morning 7/28/23   Tristan Peng MD   senna (SENOKOT) 8.6 mg Take 1 tablet (8.6 mg total) by mouth 2 (two) times a day 7/28/23   Tristan Peng MD       Current Facility-Administered Medications   Medication Dose Route Frequency   • acetylcysteine (ACETADOTE) 2,930 mg in dextrose 5 % 500 mL IVPB  50 mg/kg Intravenous Once   • acetylcysteine (ACETADOTE) 5,860 mg in dextrose 5 % 1,000 mL IVPB  100 mg/kg Intravenous Once   • albuterol (PROVENTIL HFA,VENTOLIN HFA) inhaler 2 puff  2 puff Inhalation Q6H PRN   • aluminum-magnesium hydroxide-simethicone (MAALOX) oral suspension 30 mL  30 mL Oral Q4H PRN   • apixaban (ELIQUIS) tablet 5 mg  5 mg Per PEG Tube BID   • buPROPion (WELLBUTRIN) tablet 50 mg  50 mg Per G Tube BID   • dextrose 5 % and sodium chloride 0.9 % infusion  50 mL/hr Intravenous Continuous   • DULoxetine (CYMBALTA) delayed release capsule 60 mg  60 mg Per G Tube BID   • fomepizole (ANTIZOL) 900 mg in sodium chloride 0.9 % 100 mL IVPB  15 mg/kg Intravenous Q12H    Followed by   • [START ON 8/27/2023] fomepizole (ANTIZOL) 600 mg in sodium chloride 0.9 % 100 mL IVPB  10 mg/kg Intravenous Q12H    Followed by   • [START ON 8/29/2023] fomepizole (ANTIZOL) 900 mg in sodium chloride 0.9 % 100 mL IVPB  15 mg/kg Intravenous Q12H   • furosemide (LASIX) injection 20 mg  20 mg Intravenous Once   • gabapentin (NEURONTIN) oral solution 100 mg  100 mg Oral HS   • hydroxychloroquine (PLAQUENIL) tablet 200 mg  200 mg Per G Tube BID With Meals   • ipratropium-albuterol (DUO-NEB) 0.5-2.5 mg/3 mL inhalation solution 3 mL  3 mL Nebulization TID   • lamoTRIgine (LaMICtal) tablet 50 mg  50 mg Per G Tube HS   • lidocaine (LIDODERM) 5 % patch 1 patch  1 patch Topical Daily   • metoprolol tartrate (LOPRESSOR) partial tablet 12.5 mg  12.5 mg Per G Tube Q12H [address]   • omeprazole (PRILOSEC) suspension 2 mg/mL  20 mg Per G Tube Daily   • ondansetron (ZOFRAN) injection 4 mg  4 mg Intravenous Q6H PRN       Allergies   Allergen Reactions   • Ativan [Lorazepam] Other (See Comments)     Per pt daughter   • Cephalosporins Other (See Comments)     rash   • Iodine - Food Allergy Anaphylaxis     Anaphylaxis     • Methocarbamol Hives   • Sulfa Antibiotics Hives   • Bactrim [Sulfamethoxazole-Trimethoprim] Hives   • Erythromycin Hives   • Penicillins    • Shrimp Extract Allergy Skin Test - Food Allergy Other (See Comments)   • Trimethoprim Hives       Objective       Intake/Output Summary (Last 24 hours) at 8/26/2023 2044  Last data filed at 8/26/2023 1311  Gross per 24 hour   Intake 959.17 ml   Output --   Net 959.17 ml       Invasive Devices:   Long-Dwell Peripheral IV (Midline) 08/18/23 Right Brachial (Active)   Site Assessment WDL 08/26/23 0000   Dressing Type Chlorhexidine dressing 08/25/23 2000   Line Status Infusing 08/26/23 0000   Dressing Status Clean;Dry; Intact 08/25/23 2000   Dressing Intervention Dressing changed 08/25/23 2000   Dressing Change Due 09/01/23 08/26/23 0000   Reason Not Rotated Poor venous access 08/25/23 2000       Gastrostomy/Enterostomy Percutaneous Endoscopic Gastrostomy (PEG) 20 Fr. Umbilicus (Active)   Surrounding Skin Dry; Intact 08/25/23 1800   Drain Status Tube feed stopped or held 08/26/23 0800   Drainage Appearance None 08/24/23 2000   Site Description Leaking at site 08/25/23 1800   Dressing Status Old drainage 08/26/23 0009   Dressing Intervention Dressing changed 08/26/23 0009   Dressing Type Split gauze 08/26/23 0009   Intake (mL) 100 mL 08/25/23 1800   Output (mL) 10 mL 08/21/23 0800       Vitals   Vitals:    08/26/23 0830 08/26/23 1400 08/26/23 1617 08/26/23 1928   BP: 104/68 95/61 93/61 97/79   TempSrc:   Axillary Axillary   Pulse: 91 85 58 65   Resp:  18 (!) 30 (!) 32   Patient Position - Orthostatic VS: Lying Lying Lying Lying   Temp:   97.5 °F (36.4 °C) 97.8 °F (36.6 °C)         EKG, Pathology, and/or Other Studies: n/a      Lab Results: I have personally reviewed pertinent reports. Labs:    Results from last 7 days   Lab Units 08/26/23  0653 08/24/23  1724 08/24/23  0514 08/22/23  0523 08/21/23  0518   WBC Thousand/uL 14.73*   < > 12.28*   < > 9.72   HEMOGLOBIN g/dL 9.3*   < > 6.9*   < > 7.6*   I STAT HEMOGLOBIN   --   --   --    < >  --    HEMATOCRIT % 31.5*   < > 23.4*   < > 24.6*   HEMATOCRIT, ISTAT   --   --   --    < >  --    PLATELETS Thousands/uL 212   < > 188   < > 165   NEUTROS PCT %  --   --   --   --  78*   LYMPHS PCT %  --   --   --   --  11*   LYMPHO PCT % 9*  --  7*   < >  --    MONOS PCT %  --   --   --   --  10   MONO PCT % 5  --  9   < >  --    EOS PCT % 0  --  0  --  0    < > = values in this interval not displayed.       Results from last 7 days   Lab Units 08/26/23  0653 08/25/23  1444 08/25/23  0343 08/24/23  0514 08/23/23  0455 08/22/23  2120 08/22/23  0523 08/21/23  0518 08/20/23  0545   SODIUM mmol/L 146   < > 145 144   < >  --    < > 138 137   POTASSIUM mmol/L 4.1   < > 2.8* 3.5   < >  --    < > 4.7 4.9   CHLORIDE mmol/L 114*   < > 111* 108   < >  --    < > 105 105   CO2 mmol/L 23   < > 29 27   < >  --    < > 30 28   CO2, I-STAT mmol/L  --   --   --   --   --  31  --   --   --    BUN mg/dL 25   < > 20 25   < >  --    < > 21 19   CREATININE mg/dL 1.10   < > 0.81 0.90   < >  --    < > 0.67 0.82   CALCIUM mg/dL 9.0   < > 8.4 8.4   < >  --    < > 8.1* 8.2*   ALK PHOS U/L 107*  --  70 62   < >  --    < > 72 71   ALT U/L 723*  --  204* 215*   < >  --    < > 16 14   AST U/L 1,604*  --  188* 383*   < >  --    < > 34 46*   GLUCOSE, ISTAT mg/dl  --   --   --   --   --  79  --   --   --    MAGNESIUM mg/dL  --   --  1.6* 1.7*   < >  --   --  1.7* 1.6*   PHOSPHORUS mg/dL  --   --   --  3.9  --   --   --  2.3 2.6    < > = values in this interval not displayed. Results from last 7 days   Lab Units 08/26/23  1946   INR  11.99*         0   Lab Value Date/Time    TROPONINI <0.02 06/25/2021 1717    TROPONINI <0.02 09/22/2018 1244     Results from last 7 days   Lab Units 08/22/23 2031   PH DOMINGA  7.425*   PCO2 DOMINGA mm Hg 46.5   PO2 DOMINGA mm Hg 15.3*   HCO3 DOMINGA mmol/L 29.8   O2 CONTENT DOMINGA ml/dL 1.7   O2 HGB, VENOUS % 15.2*     Results from last 7 days   Lab Units 08/26/23  1715   ACETAMINOPHEN LVL ug/mL 28*     Invalid input(s): "EXTPREGUR"      Imaging Studies: I have personally reviewed pertinent reports. Counseling / Coordination of Care  Total time spent today 16 minutes. This was a phone consultation; greater than 50% of time spent in discussion with primary provider and coordination of care.

## 2023-08-27 NOTE — PROGRESS NOTES
Discontinued at daughter's request. In patient room.  Requested to speak to MD. Dr. Merle Torres and later house supervisor speaking with daughter

## 2023-08-27 NOTE — PROGRESS NOTES
[facility]  Progress Note  Name: Kristine Bunch  MRN: [id number]  Unit/Bed#: ICU 10 I Date of Admission: 8/10/2023   Date of Service: 8/27/2023 I Hospital Day: 17    Assessment/Plan   Acute liver failure  Assessment & Plan  · The patient had a slight transaminitis during admission but over the past few days has steadily risen and then doubled on 8/26 with his AST 2800, ALT 1114  · Severe elevation of INR, out of proportion to liver injury  · Multifactorial - She was found to have a slightly elevated tylenol level (that continues to normalize) which may be a contributing factor however, not thought to be the only issue (ischemic vs viral hepatitis)  · She has HF, which may contribute to hepatotoxicity  · Additionally she was on a few drugs (Plaquinel, wellbutrin and lamictal) which can all be associated with liver failure  · Plaquinel and Wellbutrin held  · Doppler studies were normal, no thrombus  · Hepatitis panel on 8.24 was positive for hep C antibodies otherwise negative  · HSV, EBV, and VZV panels pending  · Started on acyclovir until HSV can be ruled out  · GI is following- appreciate their assistance with her care  · Will continue the NaC and fomepizole for now  · Consider discontinuing fomepizole starting 8/28 per Medical Toxicology  · Supportive care  · Vitamin K supplementation  · She would not be a candidate for a liver transplant    * Acute respiratory failure with hypoxia ([address])  Assessment & Plan  · Patient was brought to the ED after being in altered mental status, requiring more oxygen.    · History of multiple episodes of vomiting at night prior to admission  · Initially admitted with aspiration pneumonia, treated with a course of antibiotics and improved  · Remained on oxygen therapy, but developed a change in MS and required transfer back to the ICU  · She was noted to have an increase WOB and was placed on BIPAP  · This current episode does not appear to be related to an acute lung issue but more likely related to acute hepatic failure    Plan:  · Continue supplementary oxygen to maintain O2 sats above 90%  · Prn BIPAP  · Our goal is to maintain her oxygen saturation > 90%  · If worsening resp status will require ett placement    Dysphagia  Assessment & Plan  · Most likely contributing to her respiratory status initially  · peg tube placed 8/17  · family previously requests continuous tube feeds. HFrEF (heart failure with reduced ejection fraction) (HCC)  Assessment & Plan  Wt Readings from Last 3 Encounters:   08/26/23 58.6 kg (129 lb 3 oz)   07/29/23 49.4 kg (108 lb 14.4 oz)   06/30/23 55.3 kg (122 lb)     -Hold further diuresis  -monitor I/o's  -ef 30%/ mod AR  -cont BB for now.  Hold if hypotension or bradycardia  -May be a contributing factor in her acute liver failure        Altered mental status  Assessment & Plan  · Noted to be more altered on 8/26 with eye opening to name but not verbal, not following commands  · 8/27 Mental status wavering this morning  · Ammonia level was normal blood sugar was normal head cT without acute abnormality  · Liver enzymes elevated, no asterixis on exam    Plan:  · Continue monitoring fingerstick blood glucose every 2-3 hours- high risk for hypoglycemia  · Hypoglycemia protocol  · Continue D10w for now  · Frequent neurochecks    Hypoglycemia  Assessment & Plan  · Current hypoglycemia may be related to her liver failure    Plan:  · Blood glucose checks every 2-3 hours  · Hypoglycemia protocol  · Placed on D10    Closed T12 fracture ([address])  Assessment & Plan  Patient has a history of low impact fall that resulted in multiple T-spine fractures  Currently pain controlled by morphine    Plan:  · On gabapentin  · Has had low dose dilaudid but was noted to have a decreased mental status  · She is on narcotics chronically, but has been hospitalized for about 2 weeks with variable use  · Would reintroduce them slowly at lower doses if need    History of stroke  Assessment & Plan  · Patient had right MCA stroke, s/p mechanical thrombectomy earlier this year  · Although the patient moves all the limbs, there is residual weakness in the left upper and lower limb  · CT head on August 8 shows No acute intracranial abnormality    Plan:  · Hold Eliquis for now given elevated INR in the setting of her liver failure  · Frequent neurochecks      Hypercalcemia  Assessment & Plan  · likely 2/2 hyperparathyroidism  · follows with endocrine  · sensipar on hold 2/2 unable to crush    Rheumatoid arthritis of multiple sites with negative rheumatoid factor ([address])  Assessment & Plan  · Patient has a history of seronegative multiple joint arthropathy  · Currently on hydroxychloroquine 200 mg twice daily  · Patient follows up with rheumatologist    Plan:  · We are going to hold her plaquenil for now. Though she has been on it for a while, it can be associated with fulminant liver failure    Bipolar depression ([address])  Assessment & Plan  · no ativan  · On wellbutrin and duloxetine  · Wellbutrin was stopped secondary to hepatic toxicity risk  · cont gabapentin and lamictal. Lower dose started 2/2 sedation and uptitrate as able. · Lamictal is also associated with hepatic toxicity but given her seizure risk, we will continue this for now      Anemia  Assessment & Plan  Recent Labs     08/25/23  0343 08/26/23  0653 08/26/23 2025   HGB 8.8* 9.3* 8.5*     Hgb has been stable overt the last few days  No signs or symptoms of GI bleed  Haptoglobin and LDH wnl  Trend CBC, transfuse for Hgb < 7           ICU Core Measures     A: Assess, Prevent, and Manage Pain · Has pain been assessed? Yes  · Need for changes to pain regimen? No   B: Both SAT/SAT  · N/A   C: Choice of Sedation · RASS Goal: N/A patient not on sedation  · Need for changes to sedation or analgesia regimen?  NA   D: Delirium · CAM-ICU: Unable to perform secondary to Acute cognitive dysfunction   E: Early Mobility · Plan for early mobility? Yes   F: Family Engagement · Plan for family engagement today? Yes       Review of Invasive Devices:          Prophylaxis:  VTE VTE covered by:    Kamryn Chung       Stress Ulcer  covered byomeprazole (PRILOSEC) suspension 2 mg/mL [[id number]]     Subjective   HPI/24hr events: Transferred to the ICU after found to have acute liver failure. Requiring mid-flow supplemental oxygen. Unable to perform ROS secondary to altered mental status       Objective                            Vitals I/O      Most Recent Min/Max in 24hrs   Temp 97.8 °F (36.6 °C) Temp  Min: 97.5 °F (36.4 °C)  Max: 97.8 °F (36.6 °C)   Pulse 102 Pulse  Min: 58  Max: 102   Resp (!) 26 Resp  Min: 23  Max: 41   /66 BP  Min: 79/47  Max: 117/86   O2 Sat 92 % SpO2  Min: 91 %  Max: 100 %      Intake/Output Summary (Last 24 hours) at 8/27/2023 1439  Last data filed at 8/27/2023 1434  Gross per 24 hour   Intake 2013.92 ml   Output --   Net 2013.92 ml         Diet NPO; Sips with meds     Invasive Monitoring Physical exam    Physical Exam  Eyes:      Pupils: Pupils are equal, round, and reactive to light. Skin:     General: Skin is warm and dry. HENT:      Head: Normocephalic. Mouth/Throat:      Mouth: Mucous membranes are dry. Cardiovascular:      Rate and Rhythm: Normal rate. Abdominal:      Palpations: Abdomen is soft. Tenderness: There is no abdominal tenderness. Constitutional:       Appearance: She is ill-appearing. Pulmonary:      Effort: Accessory muscle usage and accessory muscle usage present. Breath sounds: Rales present. Neurological:      Comments: Responds to name/ stimulation not vocalizing          Diagnostic Studies    EKG: Reviewed  Imaging:  I have personally reviewed pertinent reports.    and I have personally reviewed pertinent films in PACS     Medications:  Scheduled PRN   acyclovir, 10 mg/kg (Ideal), Q12H  DULoxetine, 60 mg, BID  fomepizole (ANTIZOL) 600 mg in sodium chloride 0.9 % 100 mL IVPB, 10 mg/kg, Q12H   Followed by  Teofilo Hull ON 8/29/2023] fomepizole (ANTIZOL) 900 mg in sodium chloride 0.9 % 100 mL IVPB, 15 mg/kg, Q12H  furosemide, 20 mg, Once  gabapentin, 100 mg, HS  ipratropium-albuterol, 3 mL, TID  lamoTRIgine, 50 mg, HS  lidocaine, 1 patch, Daily  metoprolol tartrate, 12.5 mg, Q12H KHARI  omeprazole (PRILOSEC) suspension 2 mg/mL, 20 mg, Daily  [START ON 8/28/2023] phytonadione, 10 mg, Once      albuterol, 2 puff, Q6H PRN  aluminum-magnesium hydroxide-simethicone, 30 mL, Q4H PRN  HYDROmorphone, 0.2 mg, Q6H PRN  ondansetron, 4 mg, Q6H PRN       Continuous    dextrose 10 % and normal saline infusion, 75 mL/hr, Last Rate: 75 mL/hr (08/27/23 0146)         Labs:    CBC    Recent Labs     08/26/23 2025 08/27/23  0430   WBC 16.55* 14.49*   HGB 8.5* 8.4*   HCT 29.1* 28.7*    167     BMP    Recent Labs     08/27/23  0430 08/27/23  0835   SODIUM 145 145   K 3.8 3.6   * 112*   CO2 23 23   AGAP 9 10   BUN 30* 29*   CREATININE 1.31* 1.35*   CALCIUM 9.0 9.3       Coags    Recent Labs     08/26/23  2114 08/27/23  0134 08/27/23  0835   INR  --  12.39* 9.44*   PTT 39*  --   --         Additional Electrolytes  No recent results       Blood Gas    No recent results  Recent Labs     08/26/23 2025   PHVEN 7.346   LVA5BCA 37.8*   PO2VEN 46.3*   XDB1RGQ 20.2*   BEVEN -5.0    LFTs  Recent Labs     08/27/23  0430 08/27/23  0835   ALT 1,148* 1,376*   AST 2,700* 2,841*   ALKPHOS 73 99   ALB 2.2* 2.6*   TBILI 1.29* 1.45*       Infectious  No recent results  Glucose  Recent Labs     08/26/23  2113 08/27/23  0134 08/27/23  0430 08/27/23  0835   GLUC 99 [address] [facility] MD [place]

## 2023-08-27 NOTE — ASSESSMENT & PLAN NOTE
· Current hypoglycemia may be related to her liver failure    Plan:  · Blood glucose checks every 2-3 hours  · Hypoglycemia protocol  · Placed on D10

## 2023-08-27 NOTE — ASSESSMENT & PLAN NOTE
· The patient had a slight transaminitis during admission but over the past few days has steadily risen and then doubled on 8/26 with his AST 2800, ALT 1114  · Severe elevation of INR, out of proportion to liver injury  · Multifactorial - She was found to have a slightly elevated tylenol level (that continues to normalize) which may be a contributing factor however, not thought to be the only issue (ischemic vs viral hepatitis)  · She has HF, which may contribute to hepatotoxicity  · Additionally she was on a few drugs (Plaquinel, wellbutrin and lamictal) which can all be associated with liver failure  · Plaquinel and Wellbutrin held  · Doppler studies were normal, no thrombus  · Hepatitis panel on 8.24 was positive for hep C antibodies otherwise negative  · HSV, EBV, and VZV panels pending  · Started on acyclovir until HSV can be ruled out  · GI is following- appreciate their assistance with her care  · Will continue the NaC and fomepizole for now  · Consider discontinuing fomepizole starting 8/28 per Medical Toxicology  · Supportive care  · Vitamin K supplementation  · She would not be a candidate for a liver transplant

## 2023-08-28 NOTE — ASSESSMENT & PLAN NOTE
Recent Labs     08/27/23  0430 08/28/23  0440 08/29/23  0200   HGB 8.4* 9.2* 9.8*     Hgb has been stable overt the last few days  No signs or symptoms of GI bleed  Haptoglobin and LDH wnl  Trend CBC, transfuse for Hgb < 7

## 2023-08-28 NOTE — ASSESSMENT & PLAN NOTE
Wt Readings from Last 3 Encounters:   08/28/23 63 kg (138 lb 14.2 oz)   07/29/23 49.4 kg (108 lb 14.4 oz)   06/30/23 55.3 kg (122 lb)     -Hold further diuresis  -monitor I/o's  -ef 30%/ mod AR  -cont BB for now.  Hold if hypotension or bradycardia  -May be a contributing factor in her acute liver failure

## 2023-08-28 NOTE — PROGRESS NOTES
[facility]  Progress Note  Name: Abbie Mark  MRN: [id number]  Unit/Bed#: ICU 04 I Date of Admission: 8/10/2023   Date of Service: 8/28/2023 I Hospital Day: 25    Assessment/Plan   * Acute respiratory failure with hypoxia [facility])  Assessment & Plan  · Patient was brought to the ED after being in altered mental status, requiring more oxygen.    · History of multiple episodes of vomiting at night prior to admission  · Initially admitted with aspiration pneumonia, treated with a course of antibiotics and improved  · Remained on oxygen therapy, but developed a change in MS and required transfer back to the ICU  · She was noted to have an increase WOB and was placed on BIPAP  · This current episode does not appear to be related to an acute lung issue but more likely related to acute hepatic failure    Plan:  · Continue supplementary oxygen to maintain O2 sats above 90%  · Prn BIPAP  · Our goal is to maintain her oxygen saturation > 90%  · If worsening resp status will require ett placement    Acute liver failure  Assessment & Plan  · The patient had a slight transaminitis during admission but over the past few days has steadily risen and then doubled on 8/26 with his AST 2800, ALT 1114  · Severe elevation of INR, out of proportion to liver injury  · Multifactorial - She was found to have a slightly elevated tylenol level (that continues to normalize) which may be a contributing factor however, not thought to be the only issue (ischemic vs viral hepatitis)  · She has HF, which may contribute to hepatotoxicity  · Additionally she was on a few drugs (Plaquinel, wellbutrin and lamictal) which can all be associated with liver failure  · Plaquinel and Wellbutrin held  · Doppler studies were normal, no thrombus  · Hepatitis panel on 8.24 was positive for hep C antibodies otherwise negative  · HSV, EBV, and VZV panels pending  · Started on acyclovir until HSV can be ruled out  · GI is following- appreciate their assistance with her care  · Will continue the NaC and fomepizole for now  · Consider discontinuing fomepizole starting 8/28 per Medical Toxicology  · Supportive care  · Vitamin K supplementation  · She would not be a candidate for a liver transplant    Hypoglycemia  Assessment & Plan  · Current hypoglycemia may be related to her liver failure    Plan:  · Blood glucose checks every 2-3 hours  · Hypoglycemia protocol  · Placed on D10    Closed T12 fracture [facility])  Assessment & Plan  Patient has a history of low impact fall that resulted in multiple T-spine fractures  Currently pain controlled by morphine    Plan:  · On gabapentin  · Has had low dose dilaudid but was noted to have a decreased mental status  · She is on narcotics chronically, but has been hospitalized for about 2 weeks with variable use  · Would reintroduce them slowly at lower doses if need    Dysphagia  Assessment & Plan  · Most likely contributing to her respiratory status initially  · peg tube placed 8/17  · family previously requests continuous tube feeds.       Altered mental status  Assessment & Plan  · Noted to be more altered on 8/26 with eye opening to name but not verbal, not following commands  · 8/27 Mental status wavering this morning  · Ammonia level was normal blood sugar was normal head cT without acute abnormality  · Liver enzymes elevated, no asterixis on exam    Plan:  · Continue monitoring fingerstick blood glucose every 2-3 hours- high risk for hypoglycemia  · Hypoglycemia protocol  · Continue D10w for now  · Frequent neurochecks    History of stroke  Assessment & Plan  · Patient had right MCA stroke, s/p mechanical thrombectomy earlier this year  · Although the patient moves all the limbs, there is residual weakness in the left upper and lower limb  · CT head on August 8 shows No acute intracranial abnormality    Plan:  · Hold Eliquis for now given elevated INR in the setting of her liver failure  · Frequent neurochecks      Hypercalcemia  Assessment & Plan  · likely 2/2 hyperparathyroidism  · follows with endocrine  · sensipar on hold 2/2 unable to crush    HFrEF (heart failure with reduced ejection fraction) ([facility])  Assessment & Plan  Wt Readings from Last 3 Encounters:   08/28/23 63 kg (138 lb 14.2 oz)   07/29/23 49.4 kg (108 lb 14.4 oz)   06/30/23 55.3 kg (122 lb)     -Hold further diuresis  -monitor I/o's  -ef 30%/ mod AR  -cont BB for now. Hold if hypotension or bradycardia  -May be a contributing factor in her acute liver failure        Rheumatoid arthritis of multiple sites with negative rheumatoid factor ([address])  Assessment & Plan  · Patient has a history of seronegative multiple joint arthropathy  · Currently on hydroxychloroquine 200 mg twice daily  · Patient follows up with rheumatologist    Plan:  · We are going to hold her plaquenil for now. Though she has been on it for a while, it can be associated with fulminant liver failure    Bipolar depression ([address])  Assessment & Plan  · no ativan  · On wellbutrin and duloxetine  · Wellbutrin was stopped secondary to hepatic toxicity risk  · cont gabapentin and lamictal. Lower dose started 2/2 sedation and uptitrate as able. · Lamictal is also associated with hepatic toxicity but given her seizure risk, we will continue this for now      Anemia  Assessment & Plan  Recent Labs     08/26/23  2025 08/27/23  0430 08/28/23  0440   HGB 8.5* 8.4* 9.2*     Hgb has been stable overt the last few days  No signs or symptoms of GI bleed  Haptoglobin and LDH wnl  Trend CBC, transfuse for Hgb < 7           ICU Core Measures     A: Assess, Prevent, and Manage Pain · Has pain been assessed? Yes  · Need for changes to pain regimen? Yes   B: Both SAT/SAT  · N/A   C: Choice of Sedation · RASS Goal: 0 Alert and Calm  · Need for changes to sedation or analgesia regimen? No   D: Delirium · CAM-ICU: Negative   E: Early Mobility  · Plan for early mobility?  Yes   F: Family Engagement · Plan for family engagement today? Yes       Antibiotic Review: Patient on appropriate coverage based on culture data. Review of Invasive Devices:            Prophylaxis:  VTE VTE covered by:    Simone Savant       Stress Ulcer  covered byomeprazole (PRILOSEC) suspension 2 mg/mL [[id number]]       Subjective   HPI/24hr events: no acute events overnight  pt is alert and oriented, however has difficulty to answer due to poststroke residual weakness        Objective                            Vitals I/O      Most Recent Min/Max in 24hrs   Temp (!) 97.4 °F (36.3 °C) Temp  Min: 97.4 °F (36.3 °C)  Max: 97.9 °F (36.6 °C)   Pulse 104 Pulse  Min: 86  Max: 108   Resp (!) 29 Resp  Min: 18  Max: 30   /60 BP  Min: 81/66  Max: 119/60   O2 Sat 90 % SpO2  Min: 89 %  Max: 100 %      Intake/Output Summary (Last 24 hours) at 8/28/2023 0847  Last data filed at 8/27/2023 2155  Gross per 24 hour   Intake 2852.64 ml   Output --   Net 2852.64 ml         Diet NPO; Sips with meds     Invasive Monitoring Physical exam   Arterial Line  [place] BP    No data recorded   MAP    No data recorded      Physical Exam  Eyes:      Conjunctiva/sclera: Conjunctivae normal.   Skin:     General: Skin is warm. Comments: Bruisers b/l lower extremities  Multiple wounds   HENT:      Head: Normocephalic and atraumatic. Mouth/Throat:      Mouth: Mucous membranes are moist.   Cardiovascular:      Rate and Rhythm: Regular rhythm. Tachycardia present. Abdominal:      Palpations: Abdomen is soft. Comments: Peg tube in place   Constitutional:       Appearance: She is underdeveloped. She is not ill-appearing. Pulmonary:      Effort: Pulmonary effort is normal.      Breath sounds: Rales present. No wheezing. Comments: Decreased air entry  Neurological:      Mental Status: She is alert. Cranial Nerves: Facial asymmetry present.       Comments: Left sided weakness residual          Diagnostic Studies      EKG: reviewed  Imaging: CTH, CXR, Medications:  Scheduled PRN   acyclovir, 10 mg/kg (Ideal), Q12H  DULoxetine, 60 mg, BID  fomepizole (ANTIZOL) 600 mg in sodium chloride 0.9 % 100 mL IVPB, 10 mg/kg, Q12H   Followed by  Bg Rob ON 8/29/2023] fomepizole (ANTIZOL) 900 mg in sodium chloride 0.9 % 100 mL IVPB, 15 mg/kg, Q12H  furosemide, 20 mg, Once  gabapentin, 100 mg, HS  ipratropium-albuterol, 3 mL, TID  lamoTRIgine, 50 mg, HS  lidocaine, 1 patch, Daily  metoprolol tartrate, 12.5 mg, Q12H KHARI  omeprazole (PRILOSEC) suspension 2 mg/mL, 20 mg, Daily  potassium chloride, 20 mEq, Q2H  potassium chloride, 40 mEq, Once      albuterol, 2 puff, Q6H PRN  aluminum-magnesium hydroxide-simethicone, 30 mL, Q4H PRN  HYDROmorphone, 0.2 mg, Q6H PRN  ondansetron, 4 mg, Q6H PRN       Continuous    dextrose 10 % and normal saline infusion, 75 mL/hr, Last Rate: 75 mL/hr (08/28/23 0531)         Labs:    CBC    Recent Labs     08/27/23  0430 08/28/23  0440   WBC 14.49* 11.79*   HGB 8.4* 9.2*   HCT 28.7* 30.9*    141*     BMP    Recent Labs     08/27/23  1559 08/28/23  0200   SODIUM 145 149*   K 3.2* 2.8*   * 120*   CO2 23 24   AGAP 10 5   BUN 27* 25   CREATININE 1.19 0.91   CALCIUM 8.4 7.5*       Coags    Recent Labs     08/26/23  2114 08/27/23  0134 08/27/23  1559 08/28/23  0200   INR  --    < > 7.37* 5.28*   PTT 39*  --   --   --     < > = values in this interval not displayed.         Additional Electrolytes  No recent results       Blood Gas    No recent results  Recent Labs     08/26/23 2025   PHVEN 7.346   [id number] 37.8*   PO2VEN 46.3*   QBL9FYS 20.2*   BEVEN -5.0    LFTs  Recent Labs     08/27/23  1559 08/28/23  0200   ALT 1,090* 752*   AST 1,643* 739*   ALKPHOS 88 71   ALB 2.2* 1.8*   TBILI 1.17* 1.15*       Infectious  No recent results  Glucose  Recent Labs     08/27/23  0430 08/27/23  0835 08/27/23  1559 08/28/23  0200   GLUC 116 101 120 73               Critical Care Time Delivered: Upon my evaluation, this patient had a high probability of imminent or life-threatening deterioration due to acute liver failure, acute respiratory failure, which required my direct attention, intervention, and personal management. I have personally provided 30 minutes of critical care time, exclusive of procedures, teaching, family meetings, and any prior time recorded by providers other than myself.      Bernadine Anglin MD

## 2023-08-28 NOTE — QUICK NOTE
Case discussed with primary team. OK to D/C NAC and fomepizole per med tox. Pt stable from a med tox standpoint. We will remain available as needed.
I went to room 215 again to talk to her daughter. According to her nurse she was upset because no one called her, which I did this morning. Daughter was upset about "the past 16 days" overall, that she was placed on soft restraints( for confusion and interference with equipment and safety), and that her morphine was not given   I tried to explain the situation calmly but she remained agitated and spoke loudly  She wanted to speak with the supervisor. In order to de-escalate the situation, I asked to step out so she can speak with Davonna Boas the supervisor and voice her concerns. She agreed .
Med Tox Follow Up Note    I reviewed all labs, meds, and recent notes. It is unclear how much acetaminophen is contributing to the liver injury. However, there is a temporal relationship between increased APAP dosing and significant rise in transaminases. This patient may be at higher risk for chronic APAP toxicity if she has a deficient underlying nutritional status, which can cause low glutathione stores. That may be the case here. The transaminases are stable this AM. I recommend continuing NAC at least until tomorrow and we'll see she's doing to determine whether it can be stopped or needs to be continued. The role of fomepizole is less cleat in this scenario. I think it's resonable to continue it through today, but likely not beyond that. Her coagulopathy seems a bit out of proportion to her liver injury. Certainly apixaban can raise the INR slightly, but not typically to this degree. Underlying poor nutritional status may result in relative vitamin K deficiency. Additionally, NAPQI is known to inhibit Vit K activation similar to coumadin, and therefore can result in inhibition of Vit K clotting factors. The liver injury in combination with all these factors may be responsible for the coagulopathy. I agree with Vit K supplementation especially if INR>9 and/or evidence of bleeding. Finally, lamotrigine can cause drug induced liver injury. It typically accompanies other evidence of delayed hypersensitivity reactions like fever, lymphadenopathy, rash, etc. The patient has been on this medication for over a year per chart review. So, it seems unlikely that DILI from lamotrigine would suddenly manifest now. Nonetheless consideration can be given to holding that medication for the time being until the liver injury etiology and evolution is more definitively known. Med tox will follow.
Notified by RN of confusion and patient trying to leave AMA. Not redirectable. Ativan iv ordered. Unfortunately pt had subsequent hypoxia w/poor wave form and significant hypoxia due to mouth breathing w/nc. Improved w/simple mask and midflow in low to mid 90%s O2 sat. Soft restraints in UE ordered b/l for safety interference/medical care. RN noted son was reportedly diagnosed w/covid and there was concern he may have been visiting. Will order routine am labs given transaminitis and check covid.
Nursing reached out with concern regarding acute change in mental status. Patient oriented earlier in the day, pt now with eyes rolled into back of head, pupils dilated, and only moaning in response to questions.   · Pt did received diluadid earlier in the day at 1300, will discontinue prn  · Given elevated liver enzymes, ammonia ordered, 47  · Pt currently in acute resp failure on 12L midflow, VBG however not hypercarbic  · Repeat CXR showing bilateral pleural effusions, worsening L pleural effusion, official read pending  · CBC wnl, CMP pending    Med tox reached out regarding consultation placed earlier today for transaminatis and elevated acetaminophen level, recommending the following:  · Check CK to rule out rhabo  · Start fomepizole panel  · CMP, INR, and acetaminophen q8 hours    Pt INR from earlier today resulted at 11.99  · Eliquis 5mg b.i.d discontinued for now  · CT head to be ordered given change in mental status and high INR     Case discussed with CC AP, recommendations appreciated, pt will be upgraded to the ICU for closer monitoring
PEG tube placed successfully. · Okay to use PEG tube for medications and water flushes in 4 hours. • Hold tube feeds until PEG tube has been evaluated by GI team tomorrow. • Continue tube care. • Return to floors for continued care by primary team.  • Please contact the GI fellow on-call via [employer] with any questions or concerns.     Panchito Temple MD  PGY-5 Gastroenterology Fellow
Rapid response - increased work of breathing and hypoxia - no need for intubation at this time, team to trial BIPAP and 20mg Lasix
Rapid response - increased work of breathing and hypoxia - no need for intubation at this time, team to trial BIPAP and 20mg Lasix
Tube feed residuals, 10 mL, grayish per nursing  Restart tube feeding diet at 20 mL/h and advance as tolerated so that we can treat take her off the D5 fluids and minimize additional fluid overload
2.09

## 2023-08-28 NOTE — ASSESSMENT & PLAN NOTE
· Patient was brought to the ED after being in altered mental status, requiring more oxygen.    · History of multiple episodes of vomiting at night prior to admission  · Initially admitted with aspiration pneumonia, treated with a course of antibiotics and improved  · Remained on oxygen therapy, but developed a change in MS and required transfer back to the ICU  · She was noted to have an increase WOB and was placed on BIPAP  · This current episode does not appear to be related to an acute lung issue but more likely related to acute hepatic failure      Plan:  · This am on 6 l NC  · goal is to maintain her oxygen saturation > 90%

## 2023-08-28 NOTE — ASSESSMENT & PLAN NOTE
· Most likely contributing to her respiratory status initially  · peg tube placed 8/17  · family previously requests continuous tube feeds.

## 2023-08-28 NOTE — DISCHARGE INSTR - OTHER ORDERS
Wound Care Plan:   1-Hydraguard lotion to bilateral heels twice daily and as needed. 2-Elevate heels off of bed/chair surface to offload pressure--offloading heel boots--do not use velcro over toe area of boots. 3-Low air-loss mattress. 4-Moisturize skin daily with skin nourishing lotion. 5-Turn/reposition every 2 hours while in bed, when medically stable, using positioning wedges for pressure re-distribution on skin. 6-Apply Allevyn Life foam dressing to bilateral elbows for prevention. Change dressing every 3 days and PRN. 7-Sacrum and right/mid back--cleanse with soap and water, pat dry. Apply Xeroform (cut to fit size of wound) and cover with Allevyn Life foam dressings. Elonda Derek for treatment. Change dressings every other day and as needed. 8-Bilateral arms--cleanse with normal saline, pat dry. Apply non-adherent oil emulsion dressing (adaptic). Cover with maxorb Ag and ABD, secure with yomaira and tape. Change dressings daily and PRN soilage/displacement. (May also apply this treatment to B/L feet if blisters erupt). Follow-up at the [facility].

## 2023-08-28 NOTE — ASSESSMENT & PLAN NOTE
Recent Labs     08/26/23 2025 08/27/23  0430 08/28/23  0440   HGB 8.5* 8.4* 9.2*     Hgb has been stable overt the last few days  No signs or symptoms of GI bleed  Haptoglobin and LDH wnl  Trend CBC, transfuse for Hgb < 7

## 2023-08-28 NOTE — ASSESSMENT & PLAN NOTE
· The patient had a slight transaminitis during admission but over the past few days has steadily risen and then doubled on 8/26 with his AST 2800, ALT 1114  · Severe elevation of INR, out of proportion to liver injury  · Multifactorial - She was found to have a slightly elevated tylenol level (that continues to normalize) which may be a contributing factor however, not thought to be the only issue (ischemic vs viral hepatitis)  · She has HF, which may contribute to hepatotoxicity  · Additionally she was on a few drugs (Plaquinel, wellbutrin and lamictal) which can all be associated with liver failure  · Plaquinel and Wellbutrin held  · Doppler studies were normal, no thrombus  · Hepatitis panel on 8.24 was positive for hep C antibodies otherwise negative  · EBV ab/ag positive  · HSV, and VZV panels pending  · Started on acyclovir until HSV can be ruled out  · GI is following- appreciate their assistance with her care  · S/p NAC and fomepizole, dc on  8/28 as per Medical Toxicology  · Supportive care  · Vitamin K supplementation  · She would not be a candidate for a liver transplant

## 2023-08-28 NOTE — WOUND OSTOMY CARE
Consult Note - Wound   Erika Quezada 70 y.o. female MRN: [id number]  Unit/Bed#: ICU 04 Encounter: [id number]      History and Present Illness:  70year old female presented to the hospital with hypoxia and worsening mental status. Patient's history significant for asthma, rheumatoid arthritis, stroke with left sided weakness, osteoporosis with chronic fractures, CHF, recent fall with rehab admission. Wound care team consulted for wound staging. Assessment Findings:   Patient assessed along with primary RN. Dependent for turning/repositioning and all care. On low air-loss mattress with positioning wedges in use. Preventative foam dressings in place to sacrum, heels, bridge of nose, and right elbow. Bridge of nose intact--patient using Bipap intermittently per primary RN. Incontinent of bowel and bladder with purewick in use. Buttocks and sacrum pink, intact, with dimple to sacral area. Skin is generally thin and fragile with loss of subcutaneous tissue. Neck with hyperextension. Scar to left lateral thigh/hip area. Feet, elbows, and knees purple, cool to touch. Scabbed areas to left toes. 1.  Skin tear to right pretibial--beefy red, partial thickness with scant serosanguinous drainage. Dilia-wound intact, fragile. 2.  Skin tear to left arm--yellow, likely full thickness with beefy red edges. Scant serosanguinous drainage. 3.  Left elbow--brown eschar/scabbed area with surrounding slow to yaquelin light purple and pink intact. Traumatic versus pressure etiology. 4.  Right mid back--wound with yellow slough. Not over bony prominence. Unclear etiology pressure versus traumatic. No drainage. Dilia-wound pink and blanchable, no induration. See flowsheet for wound details. Wound Care Plan:   1-Apply Allevyn Life foam dressing to right elbow, sacrum, and bilateral heels for prevention. Isacc with P.  Peel back at least daily for skin assessment and re-apply.   Change dressing every 3 days and PRN. 2-Elevate heels off of bed/chair surface to offload pressure. 3-Offloading air cushion in chair if/when out of bed. 4-Moisturize skin daily with skin nourishing cream.  5-Turn/reposition every 2 hours while in bed using positioning wedges; and weight shift frequently while in chair for pressure re-distribution on skin. 6-Low air-loss mattress. 7-Left arm, right leg, left elbow, and right/mid back--cleanse with soap and water, pat dry. Apply Allevyn Life foam dressings. Change dressings every 3 days and as needed. Wound care team to follow. Plan of care reviewed with primary RN. Wound 08/14/23 Buttocks Left (Active)   Wound Image   08/28/23 1504   Dilia-wound Assessment Intact 08/28/23 1504   Wound Length (cm) 0 cm 08/28/23 1504   Wound Width (cm) 0 cm 08/28/23 1504   Wound Depth (cm) 0 cm 08/28/23 1504   Wound Surface Area (cm^2) 0 cm^2 08/28/23 1504   Wound Volume (cm^3) 0 cm^3 08/28/23 1504   Calculated Wound Volume (cm^3) 0 cm^3 08/28/23 1504   Treatments Cleansed 08/28/23 1504   Dressing Foam, Silicon (eg. Allevyn, etc) 08/28/23 1618   Dressing Changed Changed 08/28/23 1504   Patient Tolerance Tolerated well 08/28/23 1504   Dressing Status Clean;Dry; Intact 08/28/23 1504       Wound 08/25/23 Skin tear Pretibial Right (Active)   Wound Image   08/28/23 1517   Dilia-wound Assessment Intact;Fragile 08/28/23 1517   Wound Length (cm) 1.3 cm 08/28/23 1517   Wound Width (cm) 3 cm 08/28/23 1517   Wound Depth (cm) 0.1 cm 08/28/23 1517   Wound Surface Area (cm^2) 3.9 cm^2 08/28/23 1517   Wound Volume (cm^3) 0.39 cm^3 08/28/23 1517   Calculated Wound Volume (cm^3) 0.39 cm^3 08/28/23 1517   Drainage Amount Scant 08/28/23 1517   Drainage Description Serosanguineous 08/28/23 1517   Non-staged Wound Description Partial thickness 08/28/23 1517   Treatments Cleansed 08/28/23 1517   Dressing Foam, Silicon (eg.  Allevyn, etc) 08/28/23 1618   Dressing Changed Changed 08/28/23 1517   Patient Tolerance Tolerated well 08/28/23 1517   Dressing Status Clean;Dry; Intact 08/28/23 1517       Wound 08/26/23 Skin Tear Arm Distal;Left;Posterior; Lower (Active)   Wound Image   08/28/23 1515   Dilia-wound Assessment Fragile; Purple 08/28/23 1515   Wound Length (cm) 1.3 cm 08/28/23 1515   Wound Width (cm) 1.1 cm 08/28/23 1515   Wound Depth (cm) 0.1 cm 08/28/23 1515   Wound Surface Area (cm^2) 1.43 cm^2 08/28/23 1515   Wound Volume (cm^3) 0.143 cm^3 08/28/23 1515   Calculated Wound Volume (cm^3) 0.14 cm^3 08/28/23 1515   Drainage Amount Scant 08/28/23 1515   Drainage Description Serosanguineous 08/28/23 1515   Non-staged Wound Description Full thickness 08/28/23 1515   Treatments Cleansed 08/28/23 1515   Dressing Foam, Silicon (eg. Allevyn, etc) 08/28/23 1618   Dressing Changed Changed 08/28/23 1515   Patient Tolerance Tolerated well 08/28/23 1515   Dressing Status Clean;Dry; Intact 08/28/23 1515       Wound 08/26/23 Elbow Left;Posterior (Active)   Wound Image   08/28/23 1516   Wound Description Dry;Brown 08/28/23 1516   Dilia-wound Assessment Erythema 08/28/23 1516   Wound Length (cm) 0.3 cm 08/28/23 1516   Wound Width (cm) 0.3 cm 08/28/23 1516   Wound Depth (cm) 0 cm 08/28/23 1516   Wound Surface Area (cm^2) 0.09 cm^2 08/28/23 1516   Wound Volume (cm^3) 0 cm^3 08/28/23 1516   Calculated Wound Volume (cm^3) 0 cm^3 08/28/23 1516   Treatments Cleansed;Elevated 08/28/23 1516   Dressing Foam, Silicon (eg. Allevyn, etc) 08/28/23 1618   Dressing Changed Changed 08/28/23 1516   Patient Tolerance Tolerated well 08/28/23 1516   Dressing Status Dry;Clean; Intact 08/28/23 1516       Wound 08/26/23 Back Medial;Right (Active)   Wound Image   08/28/23 1503   Wound Description Yellow;Slough 08/28/23 1503   Dilia-wound Assessment Erythema 08/28/23 1503   Wound Length (cm) 0.4 cm 08/28/23 1503   Wound Width (cm) 0.4 cm 08/28/23 1503   Wound Depth (cm) 0.1 cm 08/28/23 1503   Wound Surface Area (cm^2) 0.16 cm^2 08/28/23 1503   Wound Volume (cm^3) 0.016 cm^3 08/28/23 1503   Calculated Wound Volume (cm^3) 0.02 cm^3 08/28/23 1503   Drainage Amount None 08/28/23 1503   Treatments Cleansed 08/28/23 1503   Dressing Foam, Silicon (eg. Allevyn, etc) 08/28/23 1503   Dressing Changed Changed 08/28/23 1503   Patient Tolerance Tolerated well 08/28/23 1503   Dressing Status Dry;Clean; Intact 08/28/23 [facility] BSN, RN, [facility]

## 2023-08-28 NOTE — PROGRESS NOTES
Progress Note -  [facility]  Igor Maciel 70 y.o. female MRN: [id number]  Unit/Bed#: ICU 04 Encounter: [id number]      ASSESSMENT AND PLAN:      68-year-old female with past medical history of undifferentiated connective tissue disease, rheumatoid arthritis, bipolar depression, recent CVA 5 months ago status post PEG tube who was admitted for acute hypoxic respiratory failure. GI is consulted for elevated liver enzymes. 1. Acute liver injury  2. Elevated liver enzymes  3. Hepatitis C antibody positive  Unclear etiology though given his severely elevated Tylenol level suspect Tylenol toxicity. Other etiologies include ischemic hepatitis versus viral causes. HSV, EBV, VZV pending. Acute hepatitis panel positive for hep C antibody. Overall liver enzymes are improving with NAC/omeprazole. INR also improved with administration of vitamin K. Mental status appropriate. Right upper quadrant ultrasound shows small volume ascites but otherwise unremarkable. CT scan shows heterogeneous liver. • Follow-up HSV, EBV, VZV, hep C RNA. • Continue acyclovir until HSV results. • Continue fomepizole. • Status post 3 doses of N-acetylcysteine. • If she progresses to liver failure would not be a candidate for transplant given debilitating stroke. • Monitor LFTs, INR daily. 4. Macrocytic anemia  Hemoglobin 9.2, baseline 10-11. Macrocytic. Vitamin B12 and folate normal.  TSH normal.   Also noted to have 4.3 x 2.6 cm fluid collection in left lateral hip which may be hematoma on CT. No signs of overt GI bleeding. · Check iron panel. Supplement if necessary. · Monitor hemoglobin, transfuse for less than 7.    5. CVA with dysphagia  6. Status post PEG tube  PEG tube site clean and intact. · Okay to start tube feeds from GI perspective. · Okay to resume Eliquis if indicated. Patient's daughter was called and updated.   Gastroenterology to follow peripherally until viral studies have resulted. Rest of care per primary team.    ______________________________________________________________________    Subjective: Seen and examined. Appears oriented. Has difficulty with speech but answers questions appropriately. Denies any significant abdominal pain, nausea, vomiting. REVIEW OF SYSTEMS:    Review of Systems   Constitutional: Negative for chills and fever. HENT: Negative for congestion and sinus pressure. Respiratory: Positive for cough. Negative for shortness of breath. Cardiovascular: Negative for chest pain, palpitations and leg swelling. Gastrointestinal: Negative for abdominal pain, diarrhea, nausea and vomiting. Genitourinary: Negative for dysuria and hematuria. Musculoskeletal: Negative for arthralgias and back pain. Skin: Negative for color change and rash. Neurological: Negative for dizziness and headaches. Psychiatric/Behavioral: Negative for agitation and confusion. All other systems reviewed and are negative.          Historical Information   Past Medical History:   Diagnosis Date   • Anxiety    • Asthma    • Bipolar depression ([address])    • Chronic narcotic dependence ([address])    • Chronic pain    • Depression    • Enterovirus heart infection    • SHAR (obstructive sleep apnea)    • Osteoarthritis    • Peripheral neuropathy    • Plantar fasciitis of right foot    • Senile osteoporosis    • Seronegative arthropathy of multiple sites ([address])    • Undifferentiated connective tissue disease ([address])      Past Surgical History:   Procedure Laterality Date   • BREAST IMPLANT     • FIXATION KYPHOPLASTY LUMBAR SPINE     • HYSTERECTOMY     • INCONTINENCE SURGERY N/A    • IR STROKE ALERT  2/12/2023   • NASAL SEPTOPLASTY W/ TURBINOPLASTY N/A    • RECTAL PROLAPSE REPAIR N/A    • REPAIR RECTOCELE     • RHINOPLASTY N/A      Social History   Social History     Substance and Sexual Activity   Alcohol Use Not Currently    Comment: quit     Social History     Substance and Sexual Activity   Drug Use Not Currently   • Types: Marijuana    Comment: has medical card     Social History     Tobacco Use   Smoking Status Former   Smokeless Tobacco Never     Family History   Problem Relation Age of Onset   • No Known Problems Mother    • No Known Problems Father    • Arthritis Family    • Diabetes Family    • Stroke Family        Meds/Allergies     Medications Prior to Admission   Medication   • acetaminophen (TYLENOL) 325 mg tablet   • albuterol (PROVENTIL HFA,VENTOLIN HFA) 90 mcg/act inhaler   • apixaban (ELIQUIS) 5 mg   • buPROPion (WELLBUTRIN) 100 mg tablet   • cinacalcet (SENSIPAR) 30 mg tablet   • Diclofenac Sodium (VOLTAREN) 1 %   • docusate sodium (COLACE) 100 mg capsule   • DULoxetine (CYMBALTA) 60 mg delayed release capsule   • gabapentin (NEURONTIN) 300 mg capsule   • hydroxychloroquine (PLAQUENIL) 200 mg tablet   • lamoTRIgine (LaMICtal) 150 MG tablet   • lidocaine (LIDODERM) 5 %   • magnesium oxide (MAG-OX) 400 mg tablet   • melatonin 3 mg   • metoprolol succinate (TOPROL-XL) 25 mg 24 hr tablet   • morphine (MSIR) 15 mg tablet   • pantoprazole (PROTONIX) 40 mg tablet   • senna (SENOKOT) 8.6 mg     Current Facility-Administered Medications   Medication Dose Route Frequency   • acyclovir (ZOVIRAX) 550 mg in sodium chloride 0.9 % 100 mL IVPB  10 mg/kg (Ideal) Intravenous Q12H   • albuterol (PROVENTIL HFA,VENTOLIN HFA) inhaler 2 puff  2 puff Inhalation Q6H PRN   • aluminum-magnesium hydroxide-simethicone (MAALOX) oral suspension 30 mL  30 mL Oral Q4H PRN   • dextrose 10 % and normal saline infusion  75 mL/hr Intravenous Continuous   • DULoxetine (CYMBALTA) delayed release capsule 60 mg  60 mg Per G Tube BID   • fomepizole (ANTIZOL) 600 mg in sodium chloride 0.9 % 100 mL IVPB  10 mg/kg Intravenous Q12H    Followed by   • [START ON 8/29/2023] fomepizole (ANTIZOL) 900 mg in sodium chloride 0.9 % 100 mL IVPB  15 mg/kg Intravenous Q12H   • furosemide (LASIX) injection 20 mg  20 mg Intravenous Once   • gabapentin (NEURONTIN) oral solution 100 mg  100 mg Oral HS   • HYDROmorphone HCl (DILAUDID) injection 0.2 mg  0.2 mg Intravenous Q6H PRN   • ipratropium-albuterol (DUO-NEB) 0.5-2.5 mg/3 mL inhalation solution 3 mL  3 mL Nebulization TID   • lamoTRIgine (LaMICtal) tablet 50 mg  50 mg Oral HS   • lidocaine (LIDODERM) 5 % patch 1 patch  1 patch Topical Daily   • metoprolol tartrate (LOPRESSOR) partial tablet 12.5 mg  12.5 mg Per G Tube Q12H [facility]   • omeprazole (PRILOSEC) suspension 2 mg/mL  20 mg Per G Tube Daily   • ondansetron (ZOFRAN) injection 4 mg  4 mg Intravenous Q6H PRN       Allergies   Allergen Reactions   • Ativan [Lorazepam] Other (See Comments)     Per pt daughter   • Cephalosporins Other (See Comments)     rash   • Iodine - Food Allergy Anaphylaxis     Anaphylaxis     • Methocarbamol Hives   • Sulfa Antibiotics Hives   • Bactrim [Sulfamethoxazole-Trimethoprim] Hives   • Erythromycin Hives   • Penicillins    • Shrimp Extract Allergy Skin Test - Food Allergy Other (See Comments)   • Trimethoprim Hives           Objective     Blood pressure 119/60, pulse 104, temperature (!) 97.4 °F (36.3 °C), temperature source Axillary, resp. rate (!) 29, height 5' 4" (1.626 m), weight 63 kg (138 lb 14.2 oz), SpO2 94 %. Body mass index is 23.84 kg/m². Intake/Output Summary (Last 24 hours) at 8/28/2023 0715  Last data filed at 8/27/2023 2155  Gross per 24 hour   Intake 3002.64 ml   Output --   Net 3002.64 ml         PHYSICAL EXAM:      Physical Exam  Vitals and nursing note reviewed. Constitutional:       General: She is not in acute distress. Appearance: Normal appearance. She is ill-appearing. Interventions: Nasal cannula in place. HENT:      Head: Normocephalic and atraumatic. Mouth/Throat:      Mouth: Mucous membranes are moist.   Eyes:      Extraocular Movements: Extraocular movements intact. Conjunctiva/sclera: Conjunctivae normal.   Cardiovascular:      Pulses: Normal pulses. Pulmonary:      Effort: Pulmonary effort is normal.   Abdominal:      General: Abdomen is flat. Bowel sounds are normal. There is no distension. Palpations: Abdomen is soft. Tenderness: There is no abdominal tenderness. There is no guarding. Skin:     General: Skin is warm and dry. Neurological:      General: No focal deficit present. Mental Status: She is alert and oriented to person, place, and time. Motor: Weakness present. Psychiatric:         Mood and Affect: Mood normal.         Behavior: Behavior normal.          Lab Results:   No results displayed because visit has over 200 results. Imaging Studies: I have personally reviewed pertinent imaging studies. [address] [place] [address] [email or web address]   Gastroenterology Fellow  PGY-5  Available via [employer]  8/28/2023 7:15 AM

## 2023-08-29 NOTE — PROCEDURES
Intubation    Date/Time: 8/29/2023 9:57 AM    Performed by: LAURA Yeager  Authorized by: LAURA Yeager    Patient location:  Bedside  Consent:     Consent obtained:  Emergent situation    Risks discussed:  Aspiration, bleeding and death    Alternatives discussed:  No treatment  Universal protocol:     Immediately prior to procedure, a time out was called: yes      Patient identity confirmed:  Arm band  Pre-procedure details:     Patient status:  Unresponsive    Mallampati score:  1    Pretreatment medications:  None    Paralytics:  None  Indications:     Indications for intubation: respiratory failure, airway protection, hypoxemia and hypercapnia    Procedure details:     Preoxygenation:  Bag valve mask    CPR in progress: yes      Intubation method:  Oral    Oral intubation technique:  Direct    Laryngoscope blade: Mac 3    Tube size (mm):  9.0    Tube type:  Cuffed    Number of attempts:  1    Cricoid pressure: yes      Tube visualized through cords: yes    Placement assessment:     ETT to lip:  23 cm    Tube secured with:  ETT martin    Breath sounds:  Equal    Placement verification: chest rise, condensation, CXR verification, direct visualization, ETCO2 detector and tube exhalation      CXR findings:  ETT in proper place    Ventilator settings:  [id number]  Post-procedure details:     Patient tolerance of procedure:   Tolerated well, no immediate complications

## 2023-08-29 NOTE — PLAN OF CARE
Problem: Potential for Falls  Goal: Patient will remain free of falls  Description: INTERVENTIONS:  - Educate patient/family on patient safety including physical limitations  - Instruct patient to call for assistance with activity   - Consult OT/PT to assist with strengthening/mobility   - Keep Call bell within reach  - Keep bed low and locked with side rails adjusted as appropriate  - Keep care items and personal belongings within reach  - Initiate and maintain comfort rounds  - Make Fall Risk Sign visible to staff  - Offer Toileting every 2 Hours, in advance of need  - Initiate/Maintain bed/chair alarm  - Obtain necessary fall risk management equipment: bed/chair alarm, call bell, gripper socks, walker, bedside commode  - Apply yellow socks and bracelet for high fall risk patients  - Consider moving patient to room near nurses station  Outcome: Progressing     Problem: MOBILITY - ADULT  Goal: Maintain or return to baseline ADL function  Description: INTERVENTIONS:  - Educate patient/family on patient safety including physical limitations  - Instruct patient to call for assistance with activity   - Consult OT/PT to assist with strengthening/mobility   - Keep Call bell within reach  - Keep bed low and locked with side rails adjusted as appropriate  - Keep care items and personal belongings within reach  - Initiate and maintain comfort rounds  - Make Fall Risk Sign visible to staff  - Offer Toileting every 2 Hours, in advance of need  - Initiate/Maintain bed/chair alarm  - Obtain necessary fall risk management equipment: bed/chair alarm, call bell, gripper socks, walker, bedside commode  - Apply yellow socks and bracelet for high fall risk patients  - Consider moving patient to room near nurses station  Outcome: Progressing  Goal: Maintains/Returns to pre admission functional level  Description: INTERVENTIONS:  - Perform BMAT or MOVE assessment daily.   - Set and communicate daily mobility goal to care team and patient. - Collaborate with rehabilitation services on mobility goals if consulted  - Reposition patient every 2 hours.   - Dangle patient 3 times a day  - Stand patient 3 times a day  - Ambulate patient 3 times a day  - Out of bed to chair 3 times a day   - Out of bed for meals  - Out of bed for toileting  - Record patient progress and toleration of activity level   Outcome: Progressing     Problem: PAIN - ADULT  Goal: Verbalizes/displays adequate comfort level or baseline comfort level  Description: Interventions:  - Encourage patient to monitor pain and request assistance  - Assess pain using appropriate pain scale  - Administer analgesics based on type and severity of pain and evaluate response  - Implement non-pharmacological measures as appropriate and evaluate response  - Consider cultural and social influences on pain and pain management  - Notify physician/advanced practitioner if interventions unsuccessful or patient reports new pain  Outcome: Progressing     Problem: INFECTION - ADULT  Goal: Absence or prevention of progression during hospitalization  Description: INTERVENTIONS:  - Assess and monitor for signs and symptoms of infection  - Monitor lab/diagnostic results  - Monitor all insertion sites, i.e. indwelling lines, tubes, and drains  - Monitor endotracheal if appropriate and nasal secretions for changes in amount and color  - [place] appropriate cooling/warming therapies per order  - Administer medications as ordered  - Instruct and encourage patient and family to use good hand hygiene technique  - Identify and instruct in appropriate isolation precautions (contact isolation for CRE) for identified infection/condition  Outcome: Progressing     Problem: SAFETY ADULT  Goal: Patient will remain free of falls  Description: INTERVENTIONS:  - Educate patient/family on patient safety including physical limitations  - Instruct patient to call for assistance with activity   - Consult OT/PT to assist with strengthening/mobility   - Keep Call bell within reach  - Keep bed low and locked with side rails adjusted as appropriate  - Keep care items and personal belongings within reach  - Initiate and maintain comfort rounds  - Make Fall Risk Sign visible to staff  - Offer Toileting every 2 Hours, in advance of need  - Initiate/Maintain bed/chair alarm  - Obtain necessary fall risk management equipment: bed/chair alarm, call bell, gripper socks, walker, bedside commode  - Apply yellow socks and bracelet for high fall risk patients  - Consider moving patient to room near nurses station  Outcome: Progressing  Goal: Maintain or return to baseline ADL function  Description: INTERVENTIONS:  - Educate patient/family on patient safety including physical limitations  - Instruct patient to call for assistance with activity   - Consult OT/PT to assist with strengthening/mobility   - Keep Call bell within reach  - Keep bed low and locked with side rails adjusted as appropriate  - Keep care items and personal belongings within reach  - Initiate and maintain comfort rounds  - Make Fall Risk Sign visible to staff  - Offer Toileting every 2 Hours, in advance of need  - Initiate/Maintain bed/chair alarm  - Obtain necessary fall risk management equipment: bed/chair alarm, call bell, gripper socks, walker, bedside commode  - Apply yellow socks and bracelet for high fall risk patients  - Consider moving patient to room near nurses station  Outcome: Progressing  Goal: Maintains/Returns to pre admission functional level  Description: INTERVENTIONS:  - Perform BMAT or MOVE assessment daily.   - Set and communicate daily mobility goal to care team and patient. - Collaborate with rehabilitation services on mobility goals if consulted  - Reposition patient every 2 hours.   - Dangle patient 3 times a day  - Stand patient 3 times a day  - Ambulate patient 3 times a day  - Out of bed to chair 3 times a day   - Out of bed for meals  - Out of bed for toileting  - Record patient progress and toleration of activity level   Outcome: Progressing     Problem: DISCHARGE PLANNING  Goal: Discharge to home or other facility with appropriate resources  Description: INTERVENTIONS:  - Identify barriers to discharge w/patient  - Arrange for needed discharge resources and transportation as appropriate  - Identify discharge learning needs   - Refer to Case Management Department for coordinating discharge planning if the patient needs post-hospital services based on physician/advanced practitioner order or complex needs related to functional status, cognitive ability, or social support system  Outcome: Progressing     Problem: Knowledge Deficit  Goal: Patient/family/caregiver demonstrates understanding of disease process, treatment plan, medications, and discharge instructions  Description: Complete learning assessment and assess knowledge base.   Interventions:  - Provide teaching at level of understanding  - Provide teaching via preferred learning methods  Outcome: Progressing     Problem: Prexisting or High Potential for Compromised Skin Integrity  Goal: Skin integrity is maintained or improved  Description: INTERVENTIONS:  - Identify patients at risk for skin breakdown  - Assess and monitor skin integrity  - Assess and monitor nutrition and hydration status  - Monitor labs   - Assess for incontinence   - Turn and reposition patient  - Assist with mobility/ambulation  - Relieve pressure over bony prominences  - Avoid friction and shearing  - Provide appropriate hygiene as needed including keeping skin clean and dry  - Evaluate need for skin moisturizer/barrier cream  - Collaborate with interdisciplinary team   - Patient/family teaching  - Consider wound care consult   Outcome: Progressing     Problem: Nutrition/Hydration-ADULT  Goal: Nutrient/Hydration intake appropriate for improving, restoring or maintaining nutritional needs  Description: Monitor and assess patient's nutrition/hydration status for malnutrition. Collaborate with interdisciplinary team and initiate plan and interventions as ordered. Monitor patient's weight and dietary intake as ordered or per policy. Utilize nutrition screening tool and intervene as necessary. Determine patient's food preferences and provide high-protein, high-caloric foods as appropriate.      INTERVENTIONS:  - Monitor oral intake, urinary output, labs, and treatment plans  - Assess nutrition and hydration status and recommend course of action  - Evaluate amount of meals eaten  - Do not leave patient alone while eating due to aspiration risk  - Assist patient with eating if necessary   - Allow adequate time for meals  - Recommend/ encourage appropriate diets, oral nutritional supplements, and vitamin/mineral supplements  - Order, calculate, and assess calorie counts as needed  - Assess need for intravenous fluids  - Provide specific nutrition/hydration education as appropriate  - Include patient in decisions related to nutrition  Outcome: Progressing     Problem: RESPIRATORY - ADULT  Goal: Achieves optimal ventilation and oxygenation  Description: INTERVENTIONS:  - Assess for changes in respiratory status  - Assess for changes in mentation and behavior  - Position to facilitate oxygenation and minimize respiratory effort  - Oxygen administered by appropriate delivery if ordered   - Encourage broncho-pulmonary hygiene including cough, deep breathe, Incentive Spirometry, flutter valve  - Assess the need for suctioning and aspirate as needed  - Assess and instruct to report SOB or any respiratory difficulty  - Respiratory Therapy support as indicated  Outcome: Progressing     Problem: GENITOURINARY - ADULT  Goal: Maintains or returns to baseline urinary function  Description: INTERVENTIONS:  - Assess urinary function  - Encourage oral fluids to ensure adequate hydration if ordered  - Administer IV fluids as ordered to ensure adequate hydration  - Administer ordered medications as needed  - Offer frequent toileting  - Follow urinary retention protocol if ordered  Outcome: Progressing  Goal: Urinary catheter remains patent  Description: INTERVENTIONS:  - Assess patency of urinary catheter  - Follow guidelines for intermittent irrigation of non-functioning urinary catheter  Outcome: Progressing     Problem: NEUROSENSORY - ADULT  Goal: Achieves stable or improved neurological status  Description: INTERVENTIONS  - Monitor and report changes in neurological status  - Monitor vital signs such as temperature, blood pressure, glucose, and any other labs ordered   - Initiate measures to prevent increased intracranial pressure  - Monitor for seizure activity and implement precautions if appropriate      Outcome: Progressing  Goal: Achieves maximal functionality and self care  Description: INTERVENTIONS  - Monitor swallowing and airway patency with patient fatigue and changes in neurological status  - Encourage and assist patient to increase activity and self care.    - Encourage visually impaired, hearing impaired and aphasic patients to use assistive/communication devices  Outcome: Progressing     Problem: GASTROINTESTINAL - ADULT  Goal: Maintains adequate nutritional intake  Description: INTERVENTIONS:  - Monitor percentage of each meal consumed  - Identify factors contributing to decreased intake, treat as appropriate  - Assist with meals as needed  - Monitor I&O, weight, and lab values if indicated  - Obtain nutrition services referral as needed  Outcome: Progressing  Goal: Establish and maintain optimal ostomy function  Description: INTERVENTIONS:  - Assess bowel function  - Encourage oral fluids to ensure adequate hydration  - Administer IV fluids if ordered to ensure adequate hydration   - Administer ordered medications as needed  - Encourage mobilization and activity  - Nutrition services referral to assist patient with appropriate food choices  - Assess stoma site  - Consider wound care consult   Outcome: Progressing     Problem: METABOLIC, FLUID AND ELECTROLYTES - ADULT  Goal: Electrolytes maintained within normal limits  Description: INTERVENTIONS:  - Monitor labs and assess patient for signs and symptoms of electrolyte imbalances  - Administer electrolyte replacement as ordered  - Monitor response to electrolyte replacements, including repeat lab results as appropriate  - Instruct patient on fluid and nutrition as appropriate  Outcome: Progressing  Goal: Fluid balance maintained  Description: INTERVENTIONS:  - Monitor labs   - Monitor I/O and WT  - Instruct patient on fluid and nutrition as appropriate  - Assess for signs & symptoms of volume excess or deficit  Outcome: Progressing  Goal: Glucose maintained within target range  Description: INTERVENTIONS:  - Monitor Blood Glucose as ordered  - Assess for signs and symptoms of hyperglycemia and hypoglycemia  - Administer ordered medications to maintain glucose within target range  - Assess nutritional intake and initiate nutrition service referral as needed  Outcome: Progressing     Problem: SKIN/TISSUE INTEGRITY - ADULT  Goal: Skin Integrity remains intact(Skin Breakdown Prevention)  Description: Assess:  -Assess extremities for adequate circulation and sensation     Bed Management:  -Have minimal linens on bed & keep smooth, unwrinkled  -Change linens as needed when moist or perspiring      Toileting:  -Offer bedside commode    Skin Care:  -Avoid use of baby powder, tape, friction and shearing, hot water or constrictive clothing    Outcome: Progressing  Goal: Incision(s), wounds(s) or drain site(s) healing without S/S of infection  Description: INTERVENTIONS  - Assess and document dressing, incision, wound bed, drain sites and surrounding tissue  - Provide patient and family education  Outcome: Progressing     Problem: HEMATOLOGIC - ADULT  Goal: Maintains hematologic stability  Description: INTERVENTIONS  - Assess for signs and symptoms of bleeding or hemorrhage  - Monitor labs  - Administer supportive blood products/factors as ordered and appropriate  Outcome: Progressing     Problem: SAFETY,RESTRAINT: NV/NON-SELF DESTRUCTIVE BEHAVIOR  Goal: Remains free of harm/injury (restraint for non violent/non self-detsructive behavior)  Description: INTERVENTIONS:  - Instruct patient/family regarding restraint use   - Assess and monitor physiologic and psychological status   - Provide interventions and comfort measures to meet assessed patient needs   - Identify and implement measures to help patient regain control  - Assess readiness for release of restraint   Outcome: Progressing  Goal: Returns to optimal restraint-free functioning  Description: INTERVENTIONS:  - Assess the patient's behavior and symptoms that indicate continued need for restraint  - Identify and implement measures to help patient regain control  - Assess readiness for release of restraint   Outcome: Progressing

## 2023-08-29 NOTE — PLAN OF CARE
Problem: Potential for Falls  Goal: Patient will remain free of falls  Description: INTERVENTIONS:  - Educate patient/family on patient safety including physical limitations  - Instruct patient to call for assistance with activity   - Consult OT/PT to assist with strengthening/mobility   - Keep Call bell within reach  - Keep bed low and locked with side rails adjusted as appropriate  - Keep care items and personal belongings within reach  - Initiate and maintain comfort rounds  - Make Fall Risk Sign visible to staff  - Offer Toileting every 2 Hours, in advance of need  - Initiate/Maintain bed/chair alarm  - Obtain necessary fall risk management equipment: bed/chair alarm, call bell, gripper socks, walker, bedside commode  - Apply yellow socks and bracelet for high fall risk patients  - Consider moving patient to room near nurses station  Outcome: Not Progressing     Problem: MOBILITY - ADULT  Goal: Maintain or return to baseline ADL function  Description: INTERVENTIONS:  - Educate patient/family on patient safety including physical limitations  - Instruct patient to call for assistance with activity   - Consult OT/PT to assist with strengthening/mobility   - Keep Call bell within reach  - Keep bed low and locked with side rails adjusted as appropriate  - Keep care items and personal belongings within reach  - Initiate and maintain comfort rounds  - Make Fall Risk Sign visible to staff  - Offer Toileting every 2 Hours, in advance of need  - Initiate/Maintain bed/chair alarm  - Obtain necessary fall risk management equipment: bed/chair alarm, call bell, gripper socks, walker, bedside commode  - Apply yellow socks and bracelet for high fall risk patients  - Consider moving patient to room near nurses station  Outcome: Not Progressing  Goal: Maintains/Returns to pre admission functional level  Description: INTERVENTIONS:  - Perform BMAT or MOVE assessment daily.   - Set and communicate daily mobility goal to care team and patient. - Collaborate with rehabilitation services on mobility goals if consulted  - Reposition patient every 2 hours.   - Dangle patient 3 times a day  - Stand patient 3 times a day  - Ambulate patient 3 times a day  - Out of bed to chair 3 times a day   - Out of bed for meals  - Out of bed for toileting  - Record patient progress and toleration of activity level   Outcome: Not Progressing     Problem: SAFETY ADULT  Goal: Patient will remain free of falls  Description: INTERVENTIONS:  - Educate patient/family on patient safety including physical limitations  - Instruct patient to call for assistance with activity   - Consult OT/PT to assist with strengthening/mobility   - Keep Call bell within reach  - Keep bed low and locked with side rails adjusted as appropriate  - Keep care items and personal belongings within reach  - Initiate and maintain comfort rounds  - Make Fall Risk Sign visible to staff  - Offer Toileting every 2 Hours, in advance of need  - Initiate/Maintain bed/chair alarm  - Obtain necessary fall risk management equipment: bed/chair alarm, call bell, gripper socks, walker, bedside commode  - Apply yellow socks and bracelet for high fall risk patients  - Consider moving patient to room near nurses station  Outcome: Not Progressing  Goal: Maintain or return to baseline ADL function  Description: INTERVENTIONS:  - Educate patient/family on patient safety including physical limitations  - Instruct patient to call for assistance with activity   - Consult OT/PT to assist with strengthening/mobility   - Keep Call bell within reach  - Keep bed low and locked with side rails adjusted as appropriate  - Keep care items and personal belongings within reach  - Initiate and maintain comfort rounds  - Make Fall Risk Sign visible to staff  - Offer Toileting every 2 Hours, in advance of need  - Initiate/Maintain bed/chair alarm  - Obtain necessary fall risk management equipment: bed/chair alarm, call bell, gripper socks, walker, bedside commode  - Apply yellow socks and bracelet for high fall risk patients  - Consider moving patient to room near nurses station  Outcome: Not Progressing  Goal: Maintains/Returns to pre admission functional level  Description: INTERVENTIONS:  - Perform BMAT or MOVE assessment daily.   - Set and communicate daily mobility goal to care team and patient. - Collaborate with rehabilitation services on mobility goals if consulted  - Reposition patient every 2 hours. - Dangle patient 3 times a day  - Stand patient 3 times a day  - Ambulate patient 3 times a day  - Out of bed to chair 3 times a day   - Out of bed for meals  - Out of bed for toileting  - Record patient progress and toleration of activity level   Outcome: Not Progressing     Problem: Prexisting or High Potential for Compromised Skin Integrity  Goal: Skin integrity is maintained or improved  Description: INTERVENTIONS:  - Identify patients at risk for skin breakdown  - Assess and monitor skin integrity  - Assess and monitor nutrition and hydration status  - Monitor labs   - Assess for incontinence   - Turn and reposition patient  - Assist with mobility/ambulation  - Relieve pressure over bony prominences  - Avoid friction and shearing  - Provide appropriate hygiene as needed including keeping skin clean and dry  - Evaluate need for skin moisturizer/barrier cream  - Collaborate with interdisciplinary team   - Patient/family teaching  - Consider wound care consult   Outcome: Not Progressing     Problem: Nutrition/Hydration-ADULT  Goal: Nutrient/Hydration intake appropriate for improving, restoring or maintaining nutritional needs  Description: Monitor and assess patient's nutrition/hydration status for malnutrition. Collaborate with interdisciplinary team and initiate plan and interventions as ordered. Monitor patient's weight and dietary intake as ordered or per policy.  Utilize nutrition screening tool and intervene as necessary. Determine patient's food preferences and provide high-protein, high-caloric foods as appropriate.      INTERVENTIONS:  - Monitor oral intake, urinary output, labs, and treatment plans  - Assess nutrition and hydration status and recommend course of action  - Evaluate amount of meals eaten  - Do not leave patient alone while eating due to aspiration risk  - Assist patient with eating if necessary   - Allow adequate time for meals  - Recommend/ encourage appropriate diets, oral nutritional supplements, and vitamin/mineral supplements  - Order, calculate, and assess calorie counts as needed  - Assess need for intravenous fluids  - Provide specific nutrition/hydration education as appropriate  - Include patient in decisions related to nutrition  Outcome: Not Progressing     Problem: RESPIRATORY - ADULT  Goal: Achieves optimal ventilation and oxygenation  Description: INTERVENTIONS:  - Assess for changes in respiratory status  - Assess for changes in mentation and behavior  - Position to facilitate oxygenation and minimize respiratory effort  - Oxygen administered by appropriate delivery if ordered   - Encourage broncho-pulmonary hygiene including cough, deep breathe, Incentive Spirometry, flutter valve  - Assess the need for suctioning and aspirate as needed  - Assess and instruct to report SOB or any respiratory difficulty  - Respiratory Therapy support as indicated  Outcome: Not Progressing     Problem: GENITOURINARY - ADULT  Goal: Maintains or returns to baseline urinary function  Description: INTERVENTIONS:  - Assess urinary function  - Encourage oral fluids to ensure adequate hydration if ordered  - Administer IV fluids as ordered to ensure adequate hydration  - Administer ordered medications as needed  - Offer frequent toileting  - Follow urinary retention protocol if ordered  Outcome: Not Progressing     Problem: NEUROSENSORY - ADULT  Goal: Achieves stable or improved neurological status  Description: INTERVENTIONS  - Monitor and report changes in neurological status  - Monitor vital signs such as temperature, blood pressure, glucose, and any other labs ordered   - Initiate measures to prevent increased intracranial pressure  - Monitor for seizure activity and implement precautions if appropriate      Outcome: Not Progressing  Goal: Achieves maximal functionality and self care  Description: INTERVENTIONS  - Monitor swallowing and airway patency with patient fatigue and changes in neurological status  - Encourage and assist patient to increase activity and self care.    - Encourage visually impaired, hearing impaired and aphasic patients to use assistive/communication devices  Outcome: Not Progressing     Problem: GASTROINTESTINAL - ADULT  Goal: Maintains adequate nutritional intake  Description: INTERVENTIONS:  - Monitor percentage of each meal consumed  - Identify factors contributing to decreased intake, treat as appropriate  - Assist with meals as needed  - Monitor I&O, weight, and lab values if indicated  - Obtain nutrition services referral as needed  Outcome: Not Progressing     Problem: METABOLIC, FLUID AND ELECTROLYTES - ADULT  Goal: Electrolytes maintained within normal limits  Description: INTERVENTIONS:  - Monitor labs and assess patient for signs and symptoms of electrolyte imbalances  - Administer electrolyte replacement as ordered  - Monitor response to electrolyte replacements, including repeat lab results as appropriate  - Instruct patient on fluid and nutrition as appropriate  Outcome: Not Progressing  Goal: Glucose maintained within target range  Description: INTERVENTIONS:  - Monitor Blood Glucose as ordered  - Assess for signs and symptoms of hyperglycemia and hypoglycemia  - Administer ordered medications to maintain glucose within target range  - Assess nutritional intake and initiate nutrition service referral as needed  Outcome: Not Progressing     Problem: SKIN/TISSUE INTEGRITY - ADULT  Goal: Skin Integrity remains intact(Skin Breakdown Prevention)  Description: Assess:  -Perform Paul assessment  -Clean and moisturize skin  -Inspect skin when repositioning, toileting, and assisting with ADLS  -Assess under medical devices   -Assess extremities for adequate circulation and sensation     Bed Management:  -Have minimal linens on bed & keep smooth, unwrinkled  -Change linens as needed when moist or perspiring  -Avoid sitting or lying in one position while in bed  -Keep HOB elevated    Toileting:  -Offer bedside commode  -Assess for incontinence   -Use incontinent care products after each incontinent episode     Activity:  -Mobilize patient   -Encourage activity and walks on unit  -Encourage or provide ROM exercises   -Turn and reposition patient  -Use appropriate equipment to lift or move patient in bed  -Instruct/ Assist with weight shifting when out of bed in chair  -Consider limitation of chair time     Skin Care:  -Avoid use of baby powder, tape, friction and shearing, hot water or constrictive clothing  -Relieve pressure over bony prominences   -Do not massage red bony areas    Next Steps:  -Teach patient strategies to minimize risks   -Consider consults to  interdisciplinary teams   Outcome: Not Progressing

## 2023-08-29 NOTE — ASSESSMENT & PLAN NOTE
· Most likely contributing to her respiratory status initially  · peg tube placed 8/17  · Tube feeds placed on hold due to leaking

## 2023-08-29 NOTE — ASSESSMENT & PLAN NOTE
· Noted to be more altered on 8/26 with eye opening to name but not verbal, not following commands  · 8/27 Mental status wavering this morning  · Ammonia level was normal blood sugar was normal head cT without acute abnormality  · Liver enzymes elevated, no asterixis on exam  · S/p D 10, switched to d5 in W    Plan:  · Continue monitoring fingerstick blood glucose every 2-3 hours- high risk for hypoglycemia  · Hypoglycemia protocol  · Continue D5  · Frequent neurochecks

## 2023-08-29 NOTE — PROGRESS NOTES
Interval Progress Note - Critical Care   Valeria Callejas 70 y.o. female MRN: [id number]  Unit/Bed#: ICU 04 Encounter: [id number]    I spoke with the patient's daughter and son. The daughter is power of . I explained the events and discussed CODE STATUS. Recommendation was DNR, level 2. We will continue to manage her aggressively medically but would not recommend further attempts at CPR, shocks, or cardiac resuscitation. Her prognosis if recurrent arrest is very poor. Work-up has revealed significant elevation in troponin and lactic acid. Echocardiogram shows significant reduction in ejection fraction, now 15 to 20%. X-ray imaging did show increase in pleural effusion and edema. Receiving additional diuretic medications. May need cardiac consultation. Examination is also change. Nonresponsive. No pupillary response. Sent for emergent head CT scan. Result is pending    Additional critical care time provided throughout the day was 41 minutes. No additional procedures.   Excludes family updates and critical care time by other providers      Ghulam Rodrigez MD

## 2023-08-29 NOTE — PROGRESS NOTES
Pastoral Care Progress Note    2023  Patient: Wendi Gifford : 1955  Admission Date & Time: 8/10/2023 1448  MRN: [id number] CSN: [id number]      Patient had a cardiopulmonary arrest this morning, family was called in by doctor. Provided pastoral care presence, grief support and prayer for patient and family. Will continue to support family and provided pastoral care for patient.

## 2023-08-29 NOTE — PROGRESS NOTES
[facility]  Progress Note  Name: Kristine Bunch  MRN: [id number]  Unit/Bed#: ICU 04 I Date of Admission: 8/10/2023   Date of Service: 8/29/2023 I Hospital Day: 23    Assessment/Plan   * Acute respiratory failure with hypoxia [facility])  Assessment & Plan  · Patient was brought to the ED after being in altered mental status, requiring more oxygen.    · History of multiple episodes of vomiting at night prior to admission  · Initially admitted with aspiration pneumonia, treated with a course of antibiotics and improved  · Remained on oxygen therapy, but developed a change in MS and required transfer back to the ICU  · She was noted to have an increase WOB and was placed on BIPAP  · This current episode does not appear to be related to an acute lung issue but more likely related to acute hepatic failure      Plan:  · This am on 6 l NC  · goal is to maintain her oxygen saturation > 90%    Acute liver failure  Assessment & Plan  · The patient had a slight transaminitis during admission but over the past few days has steadily risen and then doubled on 8/26 with his AST 2800, ALT 1114  · Severe elevation of INR, out of proportion to liver injury  · Multifactorial - She was found to have a slightly elevated tylenol level (that continues to normalize) which may be a contributing factor however, not thought to be the only issue (ischemic vs viral hepatitis)  · She has HF, which may contribute to hepatotoxicity  · Additionally she was on a few drugs (Plaquinel, wellbutrin and lamictal) which can all be associated with liver failure  · Plaquinel and Wellbutrin held  · Doppler studies were normal, no thrombus  · Hepatitis panel on 8.24 was positive for hep C antibodies otherwise negative  · EBV ab/ag positive  · HSV, and VZV panels pending  · Started on acyclovir until HSV can be ruled out  · GI is following- appreciate their assistance with her care  · S/p NAC and fomepizole, dc on  8/28 as per Medical Toxicology  · Supportive care  · Vitamin K supplementation  · She would not be a candidate for a liver transplant    Hypoglycemia  Assessment & Plan  · Current hypoglycemia may be related to her liver failure  · S/p D 10    Plan:  · Blood glucose checks every 2-3 hours  · Hypoglycemia protocol  · Currently on D5    Closed T12 fracture [facility])  Assessment & Plan  Patient has a history of low impact fall that resulted in multiple T-spine fractures  Currently pain controlled by morphine    Plan:  · On gabapentin  · Has had low dose dilaudid but was noted to have a decreased mental status  · She is on narcotics chronically, but has been hospitalized for about 2 weeks with variable use  · Would reintroduce them slowly at lower doses if need    Dysphagia  Assessment & Plan  · Most likely contributing to her respiratory status initially  · peg tube placed 8/17  · Tube feeds placed on hold due to leaking      Altered mental status  Assessment & Plan  · Noted to be more altered on 8/26 with eye opening to name but not verbal, not following commands  · 8/27 Mental status wavering this morning  · Ammonia level was normal blood sugar was normal head cT without acute abnormality  · Liver enzymes elevated, no asterixis on exam  · S/p D 10, switched to d5 in W    Plan:  · Continue monitoring fingerstick blood glucose every 2-3 hours- high risk for hypoglycemia  · Hypoglycemia protocol  · Continue D5  · Frequent neurochecks    History of stroke  Assessment & Plan  · Patient had right MCA stroke, s/p mechanical thrombectomy earlier this year  · Although the patient moves all the limbs, there is residual weakness in the left upper and lower limb  · CT head on August 8 shows No acute intracranial abnormality    Plan:  · Hold Eliquis for now given elevated INR in the setting of her liver failure  · Frequent neurochecks      Hypercalcemia  Assessment & Plan  · likely 2/2 hyperparathyroidism  · follows with endocrine  · sensipar on hold 2/2 unable to crush    HFrEF (heart failure with reduced ejection fraction) ([facility])  Assessment & Plan  Wt Readings from Last 3 Encounters:   08/29/23 63.8 kg (140 lb 10.5 oz)   07/29/23 49.4 kg (108 lb 14.4 oz)   06/30/23 55.3 kg (122 lb)     -Hold further diuresis  -monitor I/o's  -ef 30%/ mod AR  -cont BB for now. Hold if hypotension or bradycardia  -May be a contributing factor in her acute liver failure  8/27 CXR showed pulmonary congestion  - Positive fluid balance since admission might be affected by undocumented urine   - On PE euvolemic  - Lasix for 1 dose given        Rheumatoid arthritis of multiple sites with negative rheumatoid factor ([address])  Assessment & Plan  · Patient has a history of seronegative multiple joint arthropathy  · Currently on hydroxychloroquine 200 mg twice daily  · Patient follows up with rheumatologist    Plan:  · We are going to hold her plaquenil for now. Though she has been on it for a while, it can be associated with fulminant liver failure    Bipolar depression ([address])  Assessment & Plan  · no ativan  · On wellbutrin and duloxetine  · Wellbutrin was stopped secondary to hepatic toxicity risk  · cont gabapentin and lamictal. Lower dose started 2/2 sedation and uptitrate as able. · Lamictal is also associated with hepatic toxicity but given her seizure risk, we will continue this for now      Anemia  Assessment & Plan  Recent Labs     08/27/23  0430 08/28/23  0440 08/29/23  0200   HGB 8.4* 9.2* 9.8*     Hgb has been stable overt the last few days  No signs or symptoms of GI bleed  Haptoglobin and LDH wnl  Trend CBC, transfuse for Hgb < 7           ICU Core Measures     A: Assess, Prevent, and Manage Pain · Has pain been assessed? Yes  · Need for changes to pain regimen? No   B: Both SAT/SAT  · N/A   C: Choice of Sedation · RASS Goal: 0 Alert and Calm  · Need for changes to sedation or analgesia regimen?  No   D: Delirium · CAM-ICU: Negative   E: Early Mobility  · Plan for early mobility? Yes   F: Family Engagement · Plan for family engagement today?  Yes       Antibiotic Review: not on abx    Review of Invasive Devices:  PEG, midline,        Prophylaxis:  VTE VTE covered by:    Faisal Ponce       Stress Ulcer  covered byomeprazole (PRILOSEC) suspension 2 mg/mL [[id number]]     Subjective pt is alert with difficulty to respond due to residual post stroke dysartria  HPI/24hr events: tube feeds leaking, bluish toes discoloration  Ros unable to obtain due to dysartria     Objective                            Vitals I/O      Most Recent Min/Max in 24hrs   Temp (!) 97.4 °F (36.3 °C) Temp  Min: 96.6 °F (35.9 °C)  Max: 98 °F (36.7 °C)   Pulse (!) 126 Pulse  Min: 0  Max: 141   Resp 21 Resp  Min: 10  Max: 112   /77 BP  Min: 91/60  Max: 178/65   O2 Sat (!) 77 % SpO2  Min: 77 %  Max: 99 %      Intake/Output Summary (Last 24 hours) at 8/29/2023 1003  Last data filed at 8/29/2023 0800  Gross per 24 hour   Intake 3211.75 ml   Output 1700 ml   Net 1511.75 ml         Diet Enteral/Parenteral; Tube Feeding No Oral Diet; Jevity 1.2 Prabhjot; Continuous; 44; 135; Every 4 hours     Invasive Monitoring Physical exam   Arterial Line  Wall BP    No data recorded   MAP    No data recorded     PA Catheter   Most Recent  Min/Max in 24hrs    PAP   No data recorded   CVP   No data recorded   CI    No data recorded   SVR   No data recorded           Neuro Invasive Monitoring   Most Recent  Min/Max in 24hrs    GCS 13  [place] Coma Scale Score  Min: 13  Max: 15    ICP     No data recorded    CPP (cuff)    No data recorded    CPP(carrington)    No data recorded   CVP   No data recorded     ECMO:   Most Recent Min/Max in 24hrs   Flow (LPM)   No data recorded   Speed (RPM)   No data recorded    Flow   No data recorded   FIO2        LVAD   Most Recent  Min/Max in 24hrs    Pump Flow (LPM)   No data recorded    Pump Speed (RPM)   No data recorded   Pump Power    No data recorded    Pulsatility Index   No data recorded    Physical Exam  Eyes:      Conjunctiva/sclera: Conjunctivae normal.   Skin:     Findings: Lesion and wound present. HENT:      Head: Normocephalic and atraumatic. Mouth/Throat:      Mouth: Mucous membranes are moist.   Cardiovascular:      Rate and Rhythm: Bradycardia present. Abdominal:      Palpations: Abdomen is soft. Comments: PEG in place   Constitutional:       Appearance: She is ill-appearing. Comments: Neck extension   Pulmonary:      Effort: Pulmonary effort is normal.      Breath sounds: Rales present.           Diagnostic Studies      EKG: no new ecg obtained  Imaging:now new imaging obtained     Medications:  Scheduled PRN   acyclovir, 10 mg/kg (Ideal), Q12H  DULoxetine, 60 mg, BID  gabapentin, 100 mg, HS  ipratropium-albuterol, 3 mL, TID  lamoTRIgine, 50 mg, HS  lidocaine, 1 patch, Daily  metoprolol tartrate, 12.5 mg, Q12H KHARI  omeprazole (PRILOSEC) suspension 2 mg/mL, 20 mg, Daily  oxyCODONE, 5 mg, Q8H  sodium chloride, 500 mL, Once      albuterol, 2 puff, Q6H PRN  aluminum-magnesium hydroxide-simethicone, 30 mL, Q4H PRN  HYDROmorphone, 0.2 mg, Q4H PRN  ondansetron, 4 mg, Q6H PRN       Continuous    dextrose, 75 mL/hr, Last Rate: 75 mL/hr (08/28/23 1138)  fentaNYL, 25 mcg/hr, Last Rate: 25 mcg/hr (08/29/23 0959)         Labs:    CBC    Recent Labs     08/28/23  0440 08/29/23  0200   WBC 11.79* 9.24   HGB 9.2* 9.8*   HCT 30.9* 32.8*   * 140*     BMP    Recent Labs     08/29/23  0152 08/29/23  0804   SODIUM 142 142   K 4.0 4.0   * 111*   CO2 24 26   AGAP 4 5   BUN 23 23   CREATININE 0.77 0.76   CALCIUM 8.4 8.2*       Coags    Recent Labs     08/29/23  0152 08/29/23  0804   INR 2.34* 2.19*        Additional Electrolytes  Recent Labs     08/28/23  1615 08/29/23  0152   MG 1.7* 2.7   PHOS 2.0* 2.7          Blood Gas    Recent Labs     08/29/23  0250   PHART 7.418   VGA0ILX 35.4*   PO2ART 90.1   OFY6BIZ 22.3   BEART -1.7   SOURCE Radial, Left     Recent Labs     08/29/23  0152 08/29/23  0250   PHVEN 7.317  --    UWK2TLX 52.0*  --    PO2VEN 18.0*  --    TKY5DTO 26.0  --    BEVEN -0.6  --    SOURCE  --  Radial, Left    LFTs  Recent Labs     08/29/23  0152 08/29/23  0804   * 577*   * 304*   ALKPHOS 96 97   ALB 2.1* 2.0*   TBILI 1.44* 1.62*       Infectious  No recent results  Glucose  Recent Labs     08/28/23  0905 08/28/23  1615 08/29/23  0152 08/29/23  0804   GLUC 88 103 91 85               Critical Care Time Delivered: Upon my evaluation, this patient had a high probability of imminent or life-threatening deterioration due to acute liver failure, acute respiratory failure, which required my direct attention, intervention, and personal management. I have personally provided 30 minutes of critical care time, exclusive of procedures, teaching, family meetings, and any prior time recorded by providers other than myself.      Niru Lowery MD

## 2023-08-29 NOTE — ASSESSMENT & PLAN NOTE
Wt Readings from Last 3 Encounters:   08/29/23 63.8 kg (140 lb 10.5 oz)   07/29/23 49.4 kg (108 lb 14.4 oz)   06/30/23 55.3 kg (122 lb)     -Hold further diuresis  -monitor I/o's  -ef 30%/ mod AR  -cont BB for now.  Hold if hypotension or bradycardia  -May be a contributing factor in her acute liver failure  8/27 CXR showed pulmonary congestion  - Positive fluid balance since admission might be affected by undocumented urine   - On PE euvolemic  - Lasix for 1 dose given

## 2023-08-29 NOTE — PHYSICAL THERAPY NOTE
PHYSICAL THERAPY NOTE          Patient Name: Ventura MARTINEZR Date: 8/29/2023 08/29/23 0941   PT Last Visit   PT Visit Date 08/29/23   Note Type   Note Type Cancelled Session   Cancel Reasons Medical status  (Code team at bedside.  Will monitor medical appropriateness.)     Izabella Dobbs, PT

## 2023-08-29 NOTE — PROGRESS NOTES
Interval Progress Note - Critical Care   Mason Dose 70 y.o. female MRN: [id number]  Unit/Bed#: ICU 04 Encounter: [id number]    In the interval since the resident's note, patient began becoming bradycardic with agonal breathing. Started bag mask ventilation but patient deteriorated and became pulseless. CPR started and CODE BLUE resuscitation ensued. Please see code narrator for full details of CODE BLUE resuscitation. Prior to that, patient was being diuresed with adequate urine output. X-ray yesterday suggested volume overload with pleural effusions. Laboratory data was reviewed from early a.m. Parameters improving. Liver function improving. White blood cell count was improving. Hemoglobin stable. Renal function stable. No significant acidosis on blood gas done overnight. Postarrest chest x-ray shows adequately positioned endotracheal tube. There is interstitial edema. Likely enlarging right pleural effusion    Findings suggest likely primary combined cardiopulmonary arrest.    With prolonged resuscitation efforts, we were able to obtain spontaneous circulation. Blood pressure adequate off vasopressors. Repeat laboratory data has been sent and is pending. I did speak with the patient's son and subsequently her daughter. Updated with the events and current clinical condition. Advised to come to the hospital for further goals of care discussion. Patient is at high risk for further deterioration and subsequent arrest      Critical care time for my aspect of management was 42 minutes.   This excludes any procedures performed and any documented code blue management by other providers    Ashlie Palomo MD

## 2023-08-29 NOTE — PROGRESS NOTES
08/29/23 1200   Clinical Encounter Type   Visited With Family   Routine Visit Follow-up   Continue Visiting Yes   Confucianist Encounters   Confucianist Needs Prayer

## 2023-08-29 NOTE — ASSESSMENT & PLAN NOTE
· Current hypoglycemia may be related to her liver failure  · S/p D 10    Plan:  · Blood glucose checks every 2-3 hours  · Hypoglycemia protocol  · Currently on D5

## 2023-08-30 PROBLEM — I46.9 CARDIAC ARREST (HCC): Status: ACTIVE | Noted: 2023-01-01

## 2023-08-30 NOTE — ASSESSMENT & PLAN NOTE
· Patient was brought to the ED after being in altered mental status, requiring more oxygen.    · History of multiple episodes of vomiting at night prior to admission  · Initially admitted with aspiration pneumonia, treated with a course of antibiotics and improved  · Remained on oxygen therapy, but developed a change in MS and required transfer back to the ICU  · She was noted to have an increase WOB and was placed on BIPAP  · This current episode does not appear to be related to an acute lung issue but more likely related to acute hepatic failure  · Patient is status postcardiac arrest, was intubated   · CT H - no change from 3 day ago       Plan:  · Vent : 22/400/6/100%  · goal is to maintain her oxygen saturation > 90%  · P02 - 58.8 on 100 %  · Patient is on a Lasix drip at 40 mils per hour has pleural effusion on chest x-ray appears to be third spacing and volume overloaded

## 2023-08-30 NOTE — PROCEDURES
Central Line    Date/Time: 8/10/2023 2:48 PM    Performed by: Alejo Smith MD  Authorized by: Alejo Smith MD    Patient location:  ICU and bedside  Other Assisting Provider: Yes (comment)    Consent:     Consent obtained:  Written    Consent given by: daughter. Risks discussed:  Arterial puncture, incorrect placement, bleeding, infection, nerve damage and pneumothorax    Alternatives discussed:  No treatment  Universal protocol:     Procedure explained and questions answered to patient or proxy's satisfaction: yes      Relevant documents present and verified: yes      Test results available and properly labeled: yes      Radiology Images displayed and confirmed. If images not available, report reviewed: yes      Required blood products, implants, devices, and special equipment available: yes      Site/side marked: yes      Immediately prior to procedure, a time out was called: yes      Patient identity confirmed:  Arm band  Pre-procedure details:     Hand hygiene: Hand hygiene performed prior to insertion      Sterile barrier technique: All elements of maximal sterile technique followed      Skin preparation:  ChloraPrep    Skin preparation agent: Skin preparation agent completely dried prior to procedure    Indications:     Central line indications: medications requiring central line      Site selection rationale:  Right IJ, vessels visualisation  Anesthesia (see MAR for exact dosages):      Anesthesia method:  Local infiltration    Local anesthetic:  Lidocaine 1% w/o epi  Procedure details:     Location:  Right internal jugular    Vessel type: vein      Laterality:  Right    Approach: percutaneous technique used      Patient position:  Trendelenburg    Catheter type:  Triple lumen    Catheter size:  7 Fr    Landmarks identified: yes      Ultrasound guidance: yes      Ultrasound image availability:  Not saved    Sterile ultrasound techniques: Sterile gel and sterile probe covers were used      Number of attempts:  2    Successful placement: yes      Vessel of catheter tip end:  SVC above the right atrium  Post-procedure details:     Post-procedure:  Dressing applied and line sutured    Assessment:  Blood return through all ports, no pneumothorax on x-ray, free fluid flow and placement verified by x-ray    Post-procedure complications: none      Patient tolerance of procedure: Tolerated well, no immediate complications    Observer:  Yes

## 2023-08-30 NOTE — ASSESSMENT & PLAN NOTE
· Noted to be more altered on 8/26 with eye opening to name but not verbal, not following commands  · 8/27 Mental status wavering this morning  · Ammonia level was normal blood sugar was normal head cT without acute abnormality  · Liver enzymes elevated, no asterixis on exam  · S/p D 10, switched to d5 in W    Plan:  · Continue monitoring fingerstick blood glucose every 2-3 hours- high risk for hypoglycemia  · Hypoglycemia protocol  · Frequent neurochecks

## 2023-08-30 NOTE — PROGRESS NOTES
[facility]  Progress Note  Name: Isa Mckinley  MRN: [id number]  Unit/Bed#: ICU 04 I Date of Admission: 8/10/2023   Date of Service: 8/30/2023 I Hospital Day: 20    Assessment/Plan   Acute liver failure  Assessment & Plan  · The patient had a slight transaminitis during admission but over the past few days has steadily risen and then doubled on 8/26 with his AST 2800, ALT 1114  · Severe elevation of INR, out of proportion to liver injury  · Multifactorial - She was found to have a slightly elevated tylenol level (that continues to normalize) which may be a contributing factor however, not thought to be the only issue (ischemic vs viral hepatitis)  · She has HF, which may contribute to hepatotoxicity  · Additionally she was on a few drugs (Plaquinel, wellbutrin and lamictal) which can all be associated with liver failure  · Plaquinel and Wellbutrin held  · Doppler studies were normal, no thrombus  · Hepatitis panel on 8.24 was positive for hep C antibodies otherwise negative  · EBV ab/ag positive  · HSV, and VZV panels pending  · Started on acyclovir until HSV can be ruled out  · GI is following- appreciate their assistance with her care  · S/p NAC and fomepizole, dc on  8/28 as per Medical Toxicology  · Supportive care  · Vitamin K supplementation  · She would not be a candidate for a liver transplant  · ast 357 /  TB_ 2.4    * Acute respiratory failure with hypoxia ([address])  Assessment & Plan  · Patient was brought to the ED after being in altered mental status, requiring more oxygen.    · History of multiple episodes of vomiting at night prior to admission  · Initially admitted with aspiration pneumonia, treated with a course of antibiotics and improved  · Remained on oxygen therapy, but developed a change in MS and required transfer back to the ICU  · She was noted to have an increase WOB and was placed on BIPAP  · This current episode does not appear to be related to an acute lung issue but more likely related to acute hepatic failure  · Patient is status postcardiac arrest, was intubated   · CT H - no change from 3 day ago       Plan:  · Vent : 22/400/6/100%  · goal is to maintain her oxygen saturation > 90%  · P02 - 58.8 on 100 %  · Patient is on a Lasix drip at 40 mils per hour has pleural effusion on chest x-ray appears to be third spacing and volume overloaded     Dysphagia  Assessment & Plan  · Most likely contributing to her respiratory status initially  · peg tube placed 8/17  · Tube feeds placed on hold due to leaking      HFrEF (heart failure with reduced ejection fraction) ([facility])  Assessment & Plan  Wt Readings from Last 3 Encounters:   08/29/23 63.5 kg (140 lb)   07/29/23 49.4 kg (108 lb 14.4 oz)   06/30/23 55.3 kg (122 lb)     -Hold further diuresis  -monitor I/o's  -ef 30%/ mod AR  -cont BB for now.  Hold if hypotension or bradycardia  -May be a contributing factor in her acute liver failure  8/27 CXR showed pulmonary congestion  - Positive fluid balance since admission might be affected by undocumented urine   - On PE euvolemic  - Lasix for 1 dose given  8/29 S/p cardiac arrest with ROSC   Repeat echo EF 15-20 %         Altered mental status  Assessment & Plan  · Noted to be more altered on 8/26 with eye opening to name but not verbal, not following commands  · 8/27 Mental status wavering this morning  · Ammonia level was normal blood sugar was normal head cT without acute abnormality  · Liver enzymes elevated, no asterixis on exam  · S/p D 10, switched to d5 in W    Plan:  · Continue monitoring fingerstick blood glucose every 2-3 hours- high risk for hypoglycemia  · Hypoglycemia protocol  · Frequent neurochecks    Cardiac arrest [facility])  Assessment & Plan  Patient is a 69-year-old female  who was status postcardiac arrest with ROSC,   · It appeared patient became bradycardic and agonal respiration, patient arrested and CPR and ACLS protocol -CPR was started received epinephrine x 3, mag sulfate, bicarb x 2 , fibrillation, serum chloride dextrose presents from 927 to 939  · Patient now is a DNR level 2   · Patient was started on norepinephrine and is on norepinephrine at 6 mcg/min      Hypoglycemia  Assessment & Plan  · Current hypoglycemia may be related to her liver failure  · S/p D 10    Plan:  · Blood glucose checks every 2-3 hours  · Hypoglycemia protocol      Closed T12 fracture (HCC)  Assessment & Plan  Patient has a history of low impact fall that resulted in multiple T-spine fractures  Currently pain controlled by morphine    Plan:  · Hold gabapentin  · She is on narcotics chronically, but has been hospitalized for about 2 weeks with variable use  · Would reintroduce them slowly at lower doses if need  · Oxy as needed     History of stroke  Assessment & Plan  · Patient had right MCA stroke, s/p mechanical thrombectomy earlier this year  · Although the patient moves all the limbs, there is residual weakness in the left upper and lower limb  · CT head on August 8 shows No acute intracranial abnormality    Plan:  · Hold Eliquis for now given elevated INR in the setting of her liver failure  · Frequent neurochecks  · CTH remains stable       Hypercalcemia  Assessment & Plan  · likely 2/2 hyperparathyroidism  · follows with endocrine  · sensipar on hold 2/2 unable to crush    Rheumatoid arthritis of multiple sites with negative rheumatoid factor ([address])  Assessment & Plan  · Patient has a history of seronegative multiple joint arthropathy  · Currently on hydroxychloroquine 200 mg twice daily  · Patient follows up with rheumatologist    Plan:  · We are going to hold her plaquenil for now.  Though she has been on it for a while, it can be associated with fulminant liver failure    Bipolar depression ([address])  Assessment & Plan  · no ativan  · On wellbutrin and duloxetine  · Wellbutrin was stopped secondary to hepatic toxicity risk  · cont gabapentin and lamictal. Lower dose started 2/2 sedation and uptitrate as able. · Lamictal is also associated with hepatic toxicity but given her seizure risk, we will continue this for now      Anemia  Assessment & Plan  Recent Labs     08/29/23  0200 08/29/23  0949 08/29/23  1606   HGB 9.8* 7.9* 10.6*     Hgb has been stable overt the last few days  No signs or symptoms of GI bleed  Haptoglobin and LDH wnl  Trend CBC, transfuse for Hgb < 7           ICU Core Measures     Vented Patient  VAP Bundle : ordered     A: Assess, Prevent, and Manage Pain · Has pain been assessed? Yes  · Need for changes to pain regimen? NA   B: Both Spontaneous Awakening Trials (SATs) and Spontaneous Breathing Trials (SBTs) · Plan to perform spontaneous awakening trial today? Yes   · Plan to perform spontaneous breathing trial today? Yes   · Obvious barriers to extubation? No   C: Choice of Sedation · RASS Goal: 0 Alert and Calm  · Need for changes to sedation or analgesia regimen? No   D: Delirium · CAM-ICU:intubatyed    E: Early Mobility  · Plan for early mobility? NA   F: Family Engagement · Plan for family engagement today? Yes       Antibiotic Review: Patient on appropriate coverage based on culture data. Review of Invasive Devices:     [place] Plan: Continue for accurate I/O monitoring for 48 hours        Prophylaxis:  VTE VTE covered by:    Boy Landin       Stress Ulcer  covered byomeprazole (PRILOSEC) suspension 2 mg/mL [[id number]]       Subjective   HPI/24hr events:   Patient is status postcardiac arrest with ROSC after several rounds of CPR 3 of epinephrine 2 of bicarb calcium magnesium was given  Slowly weaning down norepinephrine during the night added at 12 now at 7 mcg/min  Attempt morning ABG, from history patient's ABG and VBG's are very different  Patient is spiking fevers during the night max 103.1 started cooling blanket            Objective             Intubated and sedated                 Vitals I/O      Most Recent Min/Max in 24hrs   Temp (!) 102.2 °F (39 °C) Temp  Min: 96.4 °F (35.8 °C)  Max: 102.2 °F (39 °C)   Pulse (!) 122 Pulse  Min: 0  Max: 141   Resp (!) 28 Resp  Min: 10  Max: 112   /59 BP  Min: 83/52  Max: 178/65   O2 Sat 98 % SpO2  Min: 77 %  Max: 100 %      Intake/Output Summary (Last 24 hours) at 2023 0522  Last data filed at 2023 0207  Gross per 24 hour   Intake 3387.43 ml   Output 775 ml   Net 2612.43 ml         Diet Enteral/Parenteral; Tube Feeding No Oral Diet; Jevity 1.2 Prabhjot; Continuous; 44; 135; Every 4 hours     Invasive Monitoring Physical exam   Arterial Line  Verito BP    No data recorded   MAP    No data recorded    Physical Exam  Eyes:      Comments: The left pupil is 4 mm going to 3 mm very sluggish with light  The right pupil is 4 mm nonresponsive to light   Skin:     General: Skin is cool and moist.      Capillary Refill: Capillary refill takes 2 to 3 seconds. Coloration: Skin is cyanotic. Findings: Abrasion, bruising, ecchymosis, erythema, signs of injury, lesion and wound present. HENT:      Head: Normocephalic and atraumatic. Neck:     Vascular: JVD present. Cardiovascular:      Rate and Rhythm: Regular rhythm. Tachycardia present. Pulses: Decreased pulses. Heart sounds: Murmur heard. Musculoskeletal:         General: Swelling and signs of injury present. Right lower le+ Edema present. Left lower le+ Edema present. Abdominal:      General: There is abdominal type feeding tube. There is no distension. Palpations: Abdomen is soft. Constitutional:       Appearance: She is underweight. She is ill-appearing and toxic-appearing. Interventions: She is sedated and intubated. Pulmonary:      Effort: Tachypnea, accessory muscle usage and accessory muscle usage present. She is intubated. Breath sounds: Examination of the right-middle field reveals decreased breath sounds and rales. Examination of the left-middle field reveals rales.  Examination of the right-lower field reveals decreased breath sounds and rales. Examination of the left-lower field reveals decreased breath sounds and rales. Decreased breath sounds and rales present. Neurological:      Mental Status: She is unresponsive. Genitourinary/Anorectal:  [place]de         Diagnostic Studies      EKG: ST  Imaging:  I have personally reviewed pertinent reports.        Medications:  Scheduled PRN   acyclovir, 10 mg/kg (Ideal), Q12H  DULoxetine, 60 mg, BID  gabapentin, 100 mg, HS  ipratropium-albuterol, 3 mL, TID  lamoTRIgine, 50 mg, HS  lidocaine, 1 patch, Daily  metoprolol tartrate, 12.5 mg, Q12H KHARI  omeprazole (PRILOSEC) suspension 2 mg/mL, 20 mg, Daily      albuterol, 2 puff, Q6H PRN  aluminum-magnesium hydroxide-simethicone, 30 mL, Q4H PRN  fentanyl citrate (PF), 25 mcg, Q1H PRN  ondansetron, 4 mg, Q6H PRN       Continuous    fentaNYL, 25 mcg/hr, Last Rate: 25 mcg/hr (08/29/23 0959)  furosemide, 40 mg/hr, Last Rate: 40 mg/hr (08/30/23 0119)  norepinephrine, 1-30 mcg/min, Last Rate: 6 mcg/min (08/30/23 0408)         Labs:    CBC    Recent Labs     08/29/23  0200 08/29/23  0949 08/29/23  1606 08/30/23  0507   WBC 9.24 8.48 13.40*  --    HGB 9.8* 7.9* 10.6* 12.6   HCT 32.8* 26.8* 35.4 37   *  --  67*  --    BANDSPCT  --  1  --   --      BMP    Recent Labs     08/29/23  0804 08/29/23  1606 08/30/23  0507   SODIUM 142 144  --    K 4.0 4.8  --    * 111*  --    CO2 26 22 20*   AGAP 5 11  --    BUN 23 25  --    CREATININE 0.76 0.95  --    CALCIUM 8.2* 9.0  --        Coags    Recent Labs     08/29/23  0152 08/29/23  0804   INR 2.34* 2.19*        Additional Electrolytes  Recent Labs     08/29/23  0949 08/29/23  1606 08/30/23  0507   MG 3.8* 3.0*  --    PHOS 4.4* 3.5  --    CAIONIZED  --   --  1.28          Blood Gas    Recent Labs     08/29/23  0952   PHART 7.236*   [id number] 61.1*   PO2ART 270.6*   [id number] 25.4   BEART -2.3   SOURCE Radial, Right         Recent Labs     08/29/23  0952 08/29/23  1647   PHVEN  --  7.360 [id number]  --  40.7*   PO2VEN  --  58.8*   [id number]  --  22.5*   BEVEN  --  -2.8   SOURCE Radial, Right  --     LFTs  Recent Labs     08/29/23  0804 08/29/23  1606   * 588*   * 357*   ALKPHOS 97 109*   ALB 2.0* 2.2*   TBILI 1.62* 2.40*       Infectious  No recent results  Glucose  Recent Labs     08/28/23  1615 08/29/23  0152 08/29/23  0804 08/29/23  1606   GLUC 103 91 85 84               Nasima Elias PA-C

## 2023-08-30 NOTE — PROGRESS NOTES
Pastoral Care Progress Note    2023  Patient: Ventura Mistry : 1955  Admission Date & Time: 8/10/2023 1448  MRN: [id number] CSN: [id number]      Continue to follow patient and support family, at this time provided listening and emotional support foe daughter. Will continue to support family and follow patient.

## 2023-08-30 NOTE — ASSESSMENT & PLAN NOTE
· The patient had a slight transaminitis during admission but over the past few days has steadily risen and then doubled on 8/26 with his AST 2800, ALT 1114  · Severe elevation of INR, out of proportion to liver injury  · Multifactorial - She was found to have a slightly elevated tylenol level (that continues to normalize) which may be a contributing factor however, not thought to be the only issue (ischemic vs viral hepatitis)  · She has HF, which may contribute to hepatotoxicity  · Additionally she was on a few drugs (Plaquinel, wellbutrin and lamictal) which can all be associated with liver failure  · Plaquinel and Wellbutrin held  · Doppler studies were normal, no thrombus  · Hepatitis panel on 8.24 was positive for hep C antibodies otherwise negative  · EBV ab/ag positive  · HSV, and VZV panels pending  · Started on acyclovir until HSV can be ruled out  · GI is following- appreciate their assistance with her care  · S/p NAC and fomepizole, dc on  8/28 as per Medical Toxicology  · Supportive care  · Vitamin K supplementation  · She would not be a candidate for a liver transplant  · ast 357 /  TB_ 2.4

## 2023-08-30 NOTE — PROCEDURES
Arterial Line Insertion    Date/Time: 8/30/2023 7:43 PM    Performed by: Herber Hobbs PA-C  Authorized by: Herber Hobbs PA-C    Patient location:  Bedside  Consent:     Consent given by:  Guardian    Risks discussed:  Bleeding, ischemia, repeat procedure, pain and infection  Universal protocol:     Patient identity confirmed:  Hospital-assigned identification number, arm band and anonymous protocol, patient vented/unresponsive  Indications:     Indications: hemodynamic monitoring, multiple ABGs, continuous blood pressure monitoring and frequent labs / infusion    Pre-procedure details:     Skin preparation:  Betadine    Preparation: Patient was prepped and draped in sterile fashion    Anesthesia (see MAR for exact dosages): Anesthesia method:  Local infiltration    Local anesthetic:  Lidocaine 1% w/o epi  Procedure details:     Location / Tip of Catheter:  Femoral    Laterality:  Right    Needle gauge:  18 G    Placement technique:  Percutaneous    Number of attempts:  1    Successful placement: yes      Transducer: waveform confirmed    Post-procedure details:     Post-procedure:  Secured with tape, sterile dressing applied and sutured    CMS:  Normal    Patient tolerance of procedure:   Tolerated well, no immediate complications

## 2023-08-30 NOTE — PHYSICAL THERAPY NOTE
PHYSICAL THERAPY NOTE          Patient Name: Joanne JIMENEZ Date: 8/30/2023 08/30/23 1520   PT Last Visit   PT Visit Date 08/30/23   Note Type   Note type Cancelled Session   Cancel Reasons Intubated/sedated   Additional Comments Not medically appropriate for PT intervention at this time. Will monitor medical course and appropriateness for services.      Ankita Beltran, PT

## 2023-08-30 NOTE — ASSESSMENT & PLAN NOTE
· Current hypoglycemia may be related to her liver failure  · S/p D 10    Plan:  · Blood glucose checks every 2-3 hours  · Hypoglycemia protocol

## 2023-08-30 NOTE — ASSESSMENT & PLAN NOTE
· Patient had right MCA stroke, s/p mechanical thrombectomy earlier this year  · Although the patient moves all the limbs, there is residual weakness in the left upper and lower limb  · CT head on August 8 shows No acute intracranial abnormality    Plan:  · Hold Eliquis for now given elevated INR in the setting of her liver failure  · Frequent neurochecks  · CTH remains stable

## 2023-08-30 NOTE — OCCUPATIONAL THERAPY NOTE
Occupational Therapy         Patient Name: Mariela Rivera  YCLTN'I Date: 8/30/2023 08/30/23 1025   OT Last Visit   OT Visit Date 08/30/23   Note Type   Note type Cancelled Session   Cancel Reasons Intubated/sedated  (Pt intubated, cardiac arrest 8/29. Hypotensive and tachycardic. Not appropriate for therapies at this time.  Will continue to monitor.)     Steven Rojas

## 2023-08-30 NOTE — ASSESSMENT & PLAN NOTE
Patient has a history of low impact fall that resulted in multiple T-spine fractures  Currently pain controlled by morphine    Plan:  · Hold gabapentin  · She is on narcotics chronically, but has been hospitalized for about 2 weeks with variable use  · Would reintroduce them slowly at lower doses if need  · Oxy as needed

## 2023-08-30 NOTE — ASSESSMENT & PLAN NOTE
Wt Readings from Last 3 Encounters:   08/29/23 63.5 kg (140 lb)   07/29/23 49.4 kg (108 lb 14.4 oz)   06/30/23 55.3 kg (122 lb)     -Hold further diuresis  -monitor I/o's  -ef 30%/ mod AR  -cont BB for now.  Hold if hypotension or bradycardia  -May be a contributing factor in her acute liver failure  8/27 CXR showed pulmonary congestion  - Positive fluid balance since admission might be affected by undocumented urine   - On PE euvolemic  - Lasix for 1 dose given  8/29 S/p cardiac arrest with ROSC   Repeat echo EF 15-20 %

## 2023-08-30 NOTE — PROGRESS NOTES
Progress Note -  [facility]  Bruce Robles 70 y.o. female MRN: [id number]  Unit/Bed#: ICU 04 Encounter: [id number]      ASSESSMENT AND PLAN:      54-year-old female with past medical history of undifferentiated connective tissue disease, rheumatoid arthritis, bipolar depression, recent CVA 5 months ago status post PEG tube who was admitted for acute hypoxic respiratory failure. She is now status postcardiac arrest and on ventilator. GI is consulted for elevated liver enzymes. 1. Acute liver injury  2. Elevated liver enzymes  3. Hepatitis C antibody positive  Unclear etiology though given his severely elevated Tylenol level suspect Tylenol toxicity. Other etiologies include ischemic hepatitis versus viral causes. Acute hepatitis panel positive for hep C antibody. Overall liver enzymes are improving with NAC/omeprazole INR also improved with administration of vitamin K. .  Right upper quadrant ultrasound shows small volume ascites but otherwise unremarkable. CT scan shows heterogeneous liver. HSV, VZV negative. EBV shows old infection. Hep C RNA pending. • Discontinue acyclovir  • Monitor LFTs, INR daily. 4. Macrocytic anemia  Hemoglobin 10.9, baseline 10-11. Macrocytic. Vitamin B12 and folate normal.  TSH normal.   Also noted to have 4.3 x 2.6 cm fluid collection in left lateral hip which may be hematoma on CT. No signs of overt GI bleeding. Iron panel consistent with anemia of chronic disease. · Monitor hemoglobin, transfuse for less than 7.    5. CVA with dysphagia  6. Status post PEG tube  PEG tube site clean and intact. · Okay to start tube feeds from GI perspective. · Okay to resume Eliquis if indicated. Gastroenterology to sign off. Please contact with any questions. Rest of care per primary team.    ______________________________________________________________________    Subjective: Seen and examined. On ventilator and sedated.     REVIEW OF SYSTEMS:    Review of Systems   Unable to perform ROS: Intubated          Historical Information   Past Medical History:   Diagnosis Date   • Anxiety    • Asthma    • Bipolar depression ([address])    • Chronic narcotic dependence ([address])    • Chronic pain    • Depression    • Enterovirus heart infection    • SHAR (obstructive sleep apnea)    • Osteoarthritis    • Peripheral neuropathy    • Plantar fasciitis of right foot    • Senile osteoporosis    • Seronegative arthropathy of multiple sites ([address])    • Undifferentiated connective tissue disease (HCC)      Past Surgical History:   Procedure Laterality Date   • BREAST IMPLANT     • FIXATION KYPHOPLASTY LUMBAR SPINE     • HYSTERECTOMY     • INCONTINENCE SURGERY N/A    • IR STROKE ALERT  2/12/2023   • NASAL SEPTOPLASTY W/ TURBINOPLASTY N/A    • RECTAL PROLAPSE REPAIR N/A    • REPAIR RECTOCELE     • RHINOPLASTY N/A      Social History   Social History     Substance and Sexual Activity   Alcohol Use Not Currently    Comment: quit     Social History     Substance and Sexual Activity   Drug Use Not Currently   • Types: Marijuana    Comment: has medical card     Social History     Tobacco Use   Smoking Status Former   Smokeless Tobacco Never     Family History   Problem Relation Age of Onset   • No Known Problems Mother    • No Known Problems Father    • Arthritis Family    • Diabetes Family    • Stroke Family        Meds/Allergies     Medications Prior to Admission   Medication   • acetaminophen (TYLENOL) 325 mg tablet   • albuterol (PROVENTIL HFA,VENTOLIN HFA) 90 mcg/act inhaler   • apixaban (ELIQUIS) 5 mg   • buPROPion (WELLBUTRIN) 100 mg tablet   • cinacalcet (SENSIPAR) 30 mg tablet   • Diclofenac Sodium (VOLTAREN) 1 %   • docusate sodium (COLACE) 100 mg capsule   • DULoxetine (CYMBALTA) 60 mg delayed release capsule   • gabapentin (NEURONTIN) 300 mg capsule   • hydroxychloroquine (PLAQUENIL) 200 mg tablet   • lamoTRIgine (LaMICtal) 150 MG tablet   • lidocaine (LIDODERM) 5 % • magnesium oxide (MAG-OX) 400 mg tablet   • melatonin 3 mg   • metoprolol succinate (TOPROL-XL) 25 mg 24 hr tablet   • morphine (MSIR) 15 mg tablet   • pantoprazole (PROTONIX) 40 mg tablet   • senna (SENOKOT) 8.6 mg     Current Facility-Administered Medications   Medication Dose Route Frequency   • albuterol (PROVENTIL HFA,VENTOLIN HFA) inhaler 2 puff  2 puff Inhalation Q6H PRN   • aluminum-magnesium hydroxide-simethicone (MAALOX) oral suspension 30 mL  30 mL Oral Q4H PRN   • dextrose 50 % IV solution **ADS Override Pull**       • DULoxetine (CYMBALTA) delayed release capsule 60 mg  60 mg Per G Tube BID   • fentaNYL 1000 mcg in sodium chloride 0.9% 100mL infusion  25 mcg/hr Intravenous Continuous   • fentanyl citrate (PF) 100 MCG/2ML 25 mcg  25 mcg Intravenous Q1H PRN   • furosemide (LASIX) 500 mg infusion 50 mL  40 mg/hr Intravenous Continuous   • gabapentin (NEURONTIN) oral solution 100 mg  100 mg Oral HS   • ipratropium-albuterol (DUO-NEB) 0.5-2.5 mg/3 mL inhalation solution 3 mL  3 mL Nebulization TID   • lamoTRIgine (LaMICtal) tablet 50 mg  50 mg Oral HS   • lidocaine (LIDODERM) 5 % patch 1 patch  1 patch Topical Daily   • metoprolol tartrate (LOPRESSOR) partial tablet 12.5 mg  12.5 mg Per G Tube Q12H [facility]   • NOREPINEPHRINE 4 MG  ML NSS (CMPD ORDER) infusion  1-30 mcg/min Intravenous Titrated   • omeprazole (PRILOSEC) suspension 2 mg/mL  20 mg Per G Tube Daily   • ondansetron (ZOFRAN) injection 4 mg  4 mg Intravenous Q6H PRN       Allergies   Allergen Reactions   • Ativan [Lorazepam] Other (See Comments)     Per pt daughter   • Cephalosporins Other (See Comments)     rash   • Iodine - Food Allergy Anaphylaxis     Anaphylaxis     • Methocarbamol Hives   • Sulfa Antibiotics Hives   • Bactrim [Sulfamethoxazole-Trimethoprim] Hives   • Erythromycin Hives   • Penicillins    • Shrimp Extract Allergy Skin Test - Food Allergy Other (See Comments)   • Trimethoprim Hives           Objective     Blood pressure (!) 80/57, pulse (!) 122, temperature (!) 101.6 °F (38.7 °C), temperature source Bladder, resp. rate (!) 31, height 5' 4" (1.626 m), weight 71 kg (156 lb 8.4 oz), SpO2 (!) 80 %. Body mass index is 26.87 kg/m². Intake/Output Summary (Last 24 hours) at 8/30/2023 0715  Last data filed at 8/30/2023 0600  Gross per 24 hour   Intake 2022.74 ml   Output 675 ml   Net 1347.74 ml         PHYSICAL EXAM:      Physical Exam  Vitals and nursing note reviewed. Constitutional:       General: She is not in acute distress. Appearance: Normal appearance. She is ill-appearing. Interventions: She is sedated and intubated. HENT:      Head: Normocephalic and atraumatic. Mouth/Throat:      Mouth: Mucous membranes are moist.   Eyes:      Extraocular Movements: Extraocular movements intact. Conjunctiva/sclera: Conjunctivae normal.   Cardiovascular:      Pulses: Normal pulses. Pulmonary:      Effort: Pulmonary effort is normal. She is intubated. Abdominal:      General: Abdomen is flat. Bowel sounds are normal. There is no distension. Palpations: Abdomen is soft. Tenderness: There is no abdominal tenderness. There is no guarding. Comments: PEG tube present. Skin:     General: Skin is warm and dry. Neurological:      General: No focal deficit present. Motor: No weakness. Lab Results:   No results displayed because visit has over 200 results. Imaging Studies: I have personally reviewed pertinent imaging studies. [address] [place] [address] [email or web address]   Gastroenterology Fellow  [id number]  Available via [place]  8/30/2023 7:15 AM

## 2023-08-30 NOTE — ASSESSMENT & PLAN NOTE
Patient is a 59-year-old female  who was status postcardiac arrest with ROSC,   · It appeared patient became bradycardic and agonal respiration, patient arrested and CPR and ACLS protocol -CPR was started received epinephrine x 3, mag sulfate, bicarb x 2 , fibrillation, serum chloride dextrose presents from 927 to 939  · Patient now is a DNR level 2   · Patient was started on norepinephrine and is on norepinephrine at 6 mcg/min

## 2023-08-30 NOTE — PLAN OF CARE
Problem: Potential for Falls  Goal: Patient will remain free of falls  Description: INTERVENTIONS:  - Educate patient/family on patient safety including physical limitations  - Instruct patient to call for assistance with activity   - Consult OT/PT to assist with strengthening/mobility   - Keep Call bell within reach  - Keep bed low and locked with side rails adjusted as appropriate  - Keep care items and personal belongings within reach  - Initiate and maintain comfort rounds  - Make Fall Risk Sign visible to staff  - Offer Toileting every 2 Hours, in advance of need  - Initiate/Maintain bed/chair alarm  - Obtain necessary fall risk management equipment: bed/chair alarm, call bell, gripper socks, walker, bedside commode  - Apply yellow socks and bracelet for high fall risk patients  - Consider moving patient to room near nurses station  Outcome: Progressing     Problem: MOBILITY - ADULT  Goal: Maintain or return to baseline ADL function  Description: INTERVENTIONS:  - Educate patient/family on patient safety including physical limitations  - Instruct patient to call for assistance with activity   - Consult OT/PT to assist with strengthening/mobility   - Keep Call bell within reach  - Keep bed low and locked with side rails adjusted as appropriate  - Keep care items and personal belongings within reach  - Initiate and maintain comfort rounds  - Make Fall Risk Sign visible to staff  - Offer Toileting every 2 Hours, in advance of need  - Initiate/Maintain bed/chair alarm  - Obtain necessary fall risk management equipment: bed/chair alarm, call bell, gripper socks, walker, bedside commode  - Apply yellow socks and bracelet for high fall risk patients  - Consider moving patient to room near nurses station  Outcome: Progressing  Goal: Maintains/Returns to pre admission functional level  Description: INTERVENTIONS:  - Perform BMAT or MOVE assessment daily.   - Set and communicate daily mobility goal to care team and patient. - Collaborate with rehabilitation services on mobility goals if consulted  - Reposition patient every 2 hours.   - Dangle patient 3 times a day  - Stand patient 3 times a day  - Ambulate patient 3 times a day  - Out of bed to chair 3 times a day   - Out of bed for meals  - Out of bed for toileting  - Record patient progress and toleration of activity level   Outcome: Progressing     Problem: PAIN - ADULT  Goal: Verbalizes/displays adequate comfort level or baseline comfort level  Description: Interventions:  - Encourage patient to monitor pain and request assistance  - Assess pain using appropriate pain scale  - Administer analgesics based on type and severity of pain and evaluate response  - Implement non-pharmacological measures as appropriate and evaluate response  - Consider cultural and social influences on pain and pain management  - Notify physician/advanced practitioner if interventions unsuccessful or patient reports new pain  Outcome: Progressing     Problem: INFECTION - ADULT  Goal: Absence or prevention of progression during hospitalization  Description: INTERVENTIONS:  - Assess and monitor for signs and symptoms of infection  - Monitor lab/diagnostic results  - Monitor all insertion sites, i.e. indwelling lines, tubes, and drains  - Monitor endotracheal if appropriate and nasal secretions for changes in amount and color  - Crowley appropriate cooling/warming therapies per order  - Administer medications as ordered  - Instruct and encourage patient and family to use good hand hygiene technique  - Identify and instruct in appropriate isolation precautions (contact isolation for CRE) for identified infection/condition  Outcome: Progressing     Problem: SAFETY ADULT  Goal: Patient will remain free of falls  Description: INTERVENTIONS:  - Educate patient/family on patient safety including physical limitations  - Instruct patient to call for assistance with activity   - Consult OT/PT to assist with strengthening/mobility   - Keep Call bell within reach  - Keep bed low and locked with side rails adjusted as appropriate  - Keep care items and personal belongings within reach  - Initiate and maintain comfort rounds  - Make Fall Risk Sign visible to staff  - Offer Toileting every 2 Hours, in advance of need  - Initiate/Maintain bed/chair alarm  - Obtain necessary fall risk management equipment: bed/chair alarm, call bell, gripper socks, walker, bedside commode  - Apply yellow socks and bracelet for high fall risk patients  - Consider moving patient to room near nurses station  Outcome: Progressing  Goal: Maintain or return to baseline ADL function  Description: INTERVENTIONS:  - Educate patient/family on patient safety including physical limitations  - Instruct patient to call for assistance with activity   - Consult OT/PT to assist with strengthening/mobility   - Keep Call bell within reach  - Keep bed low and locked with side rails adjusted as appropriate  - Keep care items and personal belongings within reach  - Initiate and maintain comfort rounds  - Make Fall Risk Sign visible to staff  - Offer Toileting every 2 Hours, in advance of need  - Initiate/Maintain bed/chair alarm  - Obtain necessary fall risk management equipment: bed/chair alarm, call bell, gripper socks, walker, bedside commode  - Apply yellow socks and bracelet for high fall risk patients  - Consider moving patient to room near nurses station  Outcome: Progressing  Goal: Maintains/Returns to pre admission functional level  Description: INTERVENTIONS:  - Perform BMAT or MOVE assessment daily.   - Set and communicate daily mobility goal to care team and patient. - Collaborate with rehabilitation services on mobility goals if consulted  - Reposition patient every 2 hours.   - Dangle patient 3 times a day  - Stand patient 3 times a day  - Ambulate patient 3 times a day  - Out of bed to chair 3 times a day   - Out of bed for meals  - Out of bed for toileting  - Record patient progress and toleration of activity level   Outcome: Progressing     Problem: DISCHARGE PLANNING  Goal: Discharge to home or other facility with appropriate resources  Description: INTERVENTIONS:  - Identify barriers to discharge w/patient  - Arrange for needed discharge resources and transportation as appropriate  - Identify discharge learning needs   - Refer to Case Management Department for coordinating discharge planning if the patient needs post-hospital services based on physician/advanced practitioner order or complex needs related to functional status, cognitive ability, or social support system  Outcome: Progressing     Problem: Knowledge Deficit  Goal: Patient/family/caregiver demonstrates understanding of disease process, treatment plan, medications, and discharge instructions  Description: Complete learning assessment and assess knowledge base.   Interventions:  - Provide teaching at level of understanding  - Provide teaching via preferred learning methods  Outcome: Progressing     Problem: Prexisting or High Potential for Compromised Skin Integrity  Goal: Skin integrity is maintained or improved  Description: INTERVENTIONS:  - Identify patients at risk for skin breakdown  - Assess and monitor skin integrity  - Assess and monitor nutrition and hydration status  - Monitor labs   - Assess for incontinence   - Turn and reposition patient  - Assist with mobility/ambulation  - Relieve pressure over bony prominences  - Avoid friction and shearing  - Provide appropriate hygiene as needed including keeping skin clean and dry  - Evaluate need for skin moisturizer/barrier cream  - Collaborate with interdisciplinary team   - Patient/family teaching  - Consider wound care consult   Outcome: Progressing     Problem: Nutrition/Hydration-ADULT  Goal: Nutrient/Hydration intake appropriate for improving, restoring or maintaining nutritional needs  Description: Monitor and assess patient's nutrition/hydration status for malnutrition. Collaborate with interdisciplinary team and initiate plan and interventions as ordered. Monitor patient's weight and dietary intake as ordered or per policy. Utilize nutrition screening tool and intervene as necessary. Determine patient's food preferences and provide high-protein, high-caloric foods as appropriate.      INTERVENTIONS:  - Monitor oral intake, urinary output, labs, and treatment plans  - Assess nutrition and hydration status and recommend course of action  - Evaluate amount of meals eaten  - Do not leave patient alone while eating due to aspiration risk  - Assist patient with eating if necessary   - Allow adequate time for meals  - Recommend/ encourage appropriate diets, oral nutritional supplements, and vitamin/mineral supplements  - Order, calculate, and assess calorie counts as needed  - Assess need for intravenous fluids  - Provide specific nutrition/hydration education as appropriate  - Include patient in decisions related to nutrition  Outcome: Progressing     Problem: RESPIRATORY - ADULT  Goal: Achieves optimal ventilation and oxygenation  Description: INTERVENTIONS:  - Assess for changes in respiratory status  - Assess for changes in mentation and behavior  - Position to facilitate oxygenation and minimize respiratory effort  - Oxygen administered by appropriate delivery if ordered   - Encourage broncho-pulmonary hygiene including cough, deep breathe, Incentive Spirometry, flutter valve  - Assess the need for suctioning and aspirate as needed  - Assess and instruct to report SOB or any respiratory difficulty  - Respiratory Therapy support as indicated  Outcome: Progressing     Problem: GENITOURINARY - ADULT  Goal: Maintains or returns to baseline urinary function  Description: INTERVENTIONS:  - Assess urinary function  - Encourage oral fluids to ensure adequate hydration if ordered  - Administer IV fluids as ordered to ensure adequate hydration  - Administer ordered medications as needed  - Offer frequent toileting  - Follow urinary retention protocol if ordered  Outcome: Progressing  Goal: Urinary catheter remains patent  Description: INTERVENTIONS:  - Assess patency of urinary catheter  - Follow guidelines for intermittent irrigation of non-functioning urinary catheter  Outcome: Progressing     Problem: SAFETY,RESTRAINT: NV/NON-SELF DESTRUCTIVE BEHAVIOR  Goal: Remains free of harm/injury (restraint for non violent/non self-detsructive behavior)  Description: INTERVENTIONS:  - Instruct patient/family regarding restraint use   - Assess and monitor physiologic and psychological status   - Provide interventions and comfort measures to meet assessed patient needs   - Identify and implement measures to help patient regain control  - Assess readiness for release of restraint   Outcome: Progressing  Goal: Returns to optimal restraint-free functioning  Description: INTERVENTIONS:  - Assess the patient's behavior and symptoms that indicate continued need for restraint  - Identify and implement measures to help patient regain control  - Assess readiness for release of restraint   Outcome: Progressing     Problem: NEUROSENSORY - ADULT  Goal: Achieves stable or improved neurological status  Description: INTERVENTIONS  - Monitor and report changes in neurological status  - Monitor vital signs such as temperature, blood pressure, glucose, and any other labs ordered   - Initiate measures to prevent increased intracranial pressure  - Monitor for seizure activity and implement precautions if appropriate      Outcome: Progressing  Goal: Achieves maximal functionality and self care  Description: INTERVENTIONS  - Monitor swallowing and airway patency with patient fatigue and changes in neurological status  - Encourage and assist patient to increase activity and self care.    - Encourage visually impaired, hearing impaired and aphasic patients to use assistive/communication devices  Outcome: Progressing     Problem: GASTROINTESTINAL - ADULT  Goal: Maintains adequate nutritional intake  Description: INTERVENTIONS:  - Monitor percentage of each meal consumed  - Identify factors contributing to decreased intake, treat as appropriate  - Assist with meals as needed  - Monitor I&O, weight, and lab values if indicated  - Obtain nutrition services referral as needed  Outcome: Progressing  Goal: Establish and maintain optimal ostomy function  Description: INTERVENTIONS:  - Assess bowel function  - Encourage oral fluids to ensure adequate hydration  - Administer IV fluids if ordered to ensure adequate hydration   - Administer ordered medications as needed  - Encourage mobilization and activity  - Nutrition services referral to assist patient with appropriate food choices  - Assess stoma site  - Consider wound care consult   Outcome: Progressing     Problem: METABOLIC, FLUID AND ELECTROLYTES - ADULT  Goal: Electrolytes maintained within normal limits  Description: INTERVENTIONS:  - Monitor labs and assess patient for signs and symptoms of electrolyte imbalances  - Administer electrolyte replacement as ordered  - Monitor response to electrolyte replacements, including repeat lab results as appropriate  - Instruct patient on fluid and nutrition as appropriate  Outcome: Progressing  Goal: Fluid balance maintained  Description: INTERVENTIONS:  - Monitor labs   - Monitor I/O and WT  - Instruct patient on fluid and nutrition as appropriate  - Assess for signs & symptoms of volume excess or deficit  Outcome: Progressing  Goal: Glucose maintained within target range  Description: INTERVENTIONS:  - Monitor Blood Glucose as ordered  - Assess for signs and symptoms of hyperglycemia and hypoglycemia  - Administer ordered medications to maintain glucose within target range  - Assess nutritional intake and initiate nutrition service referral as needed  Outcome: Progressing     Problem: SKIN/TISSUE INTEGRITY - ADULT  Goal: Skin Integrity remains intact(Skin Breakdown Prevention)  Description: Assess:  -Perform Paul assessment every  -Clean and moisturize skin every   -Inspect skin when repositioning, toileting, and assisting with ADLS  -Assess under medical devices such as  every   -Assess extremities for adequate circulation and sensation     Bed Management:  -Have minimal linens on bed & keep smooth, unwrinkled  -Change linens as needed when moist or perspiring  -Avoid sitting or lying in one position for more than  hours while in bed  -Keep HOB at degrees     Toileting:  -Offer bedside commode  -Assess for incontinence every   -Use incontinent care products after each incontinent episode such as     Activity:  -Mobilize patient  times a day  -Encourage activity and walks on unit  -Encourage or provide ROM exercises   -Turn and reposition patient every  Hours  -Use appropriate equipment to lift or move patient in bed  -Instruct/ Assist with weight shifting every  when out of bed in chair  -Consider limitation of chair time  hour intervals    Skin Care:  -Avoid use of baby powder, tape, friction and shearing, hot water or constrictive clothing  -Relieve pressure over bony prominences using   -Do not massage red bony areas    Next Steps:  -Teach patient strategies to minimize risks such as    -Consider consults to  interdisciplinary teams such as   Outcome: Progressing  Goal: Incision(s), wounds(s) or drain site(s) healing without S/S of infection  Description: INTERVENTIONS  - Assess and document dressing, incision, wound bed, drain sites and surrounding tissue  - Provide patient and family education  - Perform skin care/dressing changes every  Outcome: Progressing     Problem: HEMATOLOGIC - ADULT  Goal: Maintains hematologic stability  Description: INTERVENTIONS  - Assess for signs and symptoms of bleeding or hemorrhage  - Monitor labs  - Administer supportive blood products/factors as ordered and appropriate  Outcome: Progressing

## 2023-08-30 NOTE — PROGRESS NOTES
08/30/23 1300   Clinical Encounter Type   Visited With Family   Routine Visit Follow-up   Continue Visiting Yes

## 2023-08-30 NOTE — CASE MANAGEMENT
Case Management Discharge Planning Note    Patient name Alex Sultana  Location ICU 04/ICU 04 MRN [id number]  : 1955 Date 2023       Current Admission Date: 8/10/2023  Current Admission Diagnosis:Acute respiratory failure with hypoxia [facility])   Patient Active Problem List    Diagnosis Date Noted   • Cardiac arrest ([address]) 2023   • Acute liver failure 2023   • Transaminitis 2023   • Pleural effusion, bilateral 2023   • Hypoglycemia 2023   • Closed T12 fracture ([address]) 08/10/2023   • Compression fracture of body of thoracic vertebra ([address]) 08/10/2023   • Acute respiratory failure with hypoxia ([address]) 08/10/2023   • Chronic combined systolic and diastolic heart failure ([address]) 08/10/2023   • Fall 2023   • Cervical dystonia 2023   • Sundowning 2023   • Night terrors 2023   • Leg hematoma, left, initial encounter 2023   • Cerebral aneurysm, nonruptured 05/15/2023   • Nocturnal hypoxia 2023   • Insomnia 2023   • Sinus tachycardia 2023   • Altered mental status 2023   • Gout of foot 2023   • Dysphagia 2023   • Wound of left ankle 2023   • Urinary tract infection without hematuria 2023   • Obstructive sleep apnea 2023   • History of stroke 2023   • History of bacteremia 2023   • Acute CVA (cerebrovascular accident) ([address]) 2023   • Hypercalcemia 2023   • Discoloration of skin of foot 2023   • HFrEF (heart failure with reduced ejection fraction) ([address]) 2023   • Stroke ([address]) 2023   • HTN (hypertension) 2023   • Cardiomyopathy ([address]) 2023   • Vitamin D insufficiency 2023   • LAILA (acute kidney injury) ([address]) 2022   • Polypharmacy 2022   • 'light-for-dates' infant with signs of fetal malnutrition 2022   • Bipolar depression ([address]) 2022   • Rheumatoid arthritis of multiple sites with negative rheumatoid factor ([address]) 2022   • Closed fracture of multiple ribs of right side 03/02/2022   • Traumatic pneumothorax 03/02/2022   • Closed fracture of transverse process of lumbar vertebra ([address]) 03/02/2022   • Liver contusion 03/02/2022   • Acute pain due to trauma 03/02/2022   • Acute pain of right shoulder 03/02/2022   • Severe protein-calorie malnutrition ([address]) 03/02/2022   • Undifferentiated connective tissue disease ([address]) 12/29/2021   • Primary generalized (osteo)arthritis 12/29/2021   • Peripheral neuropathy 12/29/2021   • Osteoporosis 12/29/2021   • Hx of compression fracture of spine 12/29/2021   • Lumbar spondylosis 12/29/2021   • Cervical spondylosis 12/29/2021   • Anemia 06/26/2021   • Chronic pain syndrome    • Ambulatory dysfunction    • Acute metabolic encephalopathy    • Seronegative arthropathy of multiple sites (HCC)    • Gastroesophageal reflux disease without esophagitis    • Mixed stress and urge urinary incontinence    • CHF (congestive heart failure) ([address])       LOS (days): 20  Geometric Mean LOS (GMLOS) (days): 5.00  Days to GMLOS:-14.8     OBJECTIVE:  Risk of Unplanned Readmission Score: 59.62         Current admission status: Inpatient   Preferred Pharmacy:   [facility]  [address] [address]  Phone: [phone] Fax: [facility], [address] [place] [address]  Phone: [phone] Fax: [phone]    Primary Care Provider: Kirk Silverman DO    Primary Insurance: MEDICARE  Secondary Insurance: [employer]    DISCHARGE DETAILS:                                                                                                 Additional Comments: Pt is s/p cardiac arrest, intubated. CM dept to continue to follow for dispo planning.

## 2023-08-30 NOTE — ASSESSMENT & PLAN NOTE
Recent Labs     08/29/23  0200 08/29/23  0949 08/29/23  1606   HGB 9.8* 7.9* 10.6*     Hgb has been stable overt the last few days  No signs or symptoms of GI bleed  Haptoglobin and LDH wnl  Trend CBC, transfuse for Hgb < 7

## 2023-08-31 NOTE — OCCUPATIONAL THERAPY NOTE
Occupational Therapy         Patient Name: Homar Woodall  ESIKC'T Date: 8/31/2023 08/31/23 1435   OT Last Visit   OT Visit Date 08/31/23   Note Type   Note type Cancelled Session   Cancel Reasons Intubated/sedated     Viktor Parsons

## 2023-08-31 NOTE — PROGRESS NOTES
[facility]  Interval Progress Note: Critical Care  Name: Javad Curry  MRN: [id number]  Unit/Bed#: ICU 04 I Date of Admission: 8/10/2023   Date of Service: 2023 I Hospital Day: 21    Interval Events:    · Right groin arterial line was placed  · Patient was started on Primacor 0.25 mcg/kg/min  · Received albumin 25% 25 g to responded appropriately   · Added Vasopressin 0.04 units/min and down titrated to Primacor 0.125 mcg/kg/min   · Patient did not tolerate Levophed combination with Primacor, remained very arrhythmogenic  · Changed Levophed to vasopressin and downgraded to Primacor and blood pressure was appropriate at that point and heart rate settled in the low 100-110  · Repeat ABG and VBG more appropriate  · Mental status remains poor  · Lactic remains high 5-9   · Continue 25% albumin every 6 hours x3 more doses  · repeat BMP around midnight showed an increasing creatinine, likely due to hypoperfusion         Pertinent New Data:   blood pressure, pulse, temperature, respirations and pulse oximetry  Arterial Line  [place] BP 92/42  Arterial Line BP  Min: 80/42  Max: 154/60   MAP (!) 60 mmHg  Arterial Line MAP (mmHg)  Min: 52 mmHg  Max: 86 mmHg     Physical Exam  Skin:     General: Skin is cool and mottled extremities. Capillary Refill: Capillary refill takes 2 to 3 seconds. Coloration: Skin is pale. Findings: Wound present. HENT:      Head: Normocephalic and atraumatic. Mouth/Throat:      Mouth: Mucous membranes are dry. Dentition: Normal dentition. Neck:     Vascular: JVD present. Cardiovascular:      Rate and Rhythm: Regular rhythm. Tachycardia present. Pulses: Decreased pulses. Heart sounds: Murmur heard. Musculoskeletal:         General: Swelling and signs of injury present. Right lower le+ Edema present. Left lower le+ Edema present.       Comments: Bilateral lower extremities bluish discolored and cold, also bilateral knees have bluish discoloration  Bilateral upper extremities are weeping with skin irritation   Abdominal:      General: There is abdominal type feeding tube. There is no distension. Palpations: Abdomen is soft. Constitutional:       Appearance: She is underweight. She is ill-appearing and toxic-appearing. Interventions: She is intubated. Pulmonary:      Effort: Tachypnea, accessory muscle usage and accessory muscle usage present. She is intubated. Breath sounds: Examination of the left-middle field reveals decreased breath sounds. Examination of the left-lower field reveals decreased breath sounds. Decreased breath sounds and rhonchi present. Neurological:      Mental Status: She is unresponsive. Genitourinary/Anorectal:  FoleyVitals and nursing note reviewed.          CBC:   Lab Results   Component Value Date    WBC 12.93 (H) 08/30/2023    HGB 10.9 (L) 08/30/2023    HCT 35.9 08/30/2023     (H) 08/30/2023     (L) 08/30/2023    RBC 3.56 (L) 08/30/2023    MCH 30.6 08/30/2023    MCHC 30.4 (L) 08/30/2023    RDW 19.6 (H) 08/30/2023    MPV 13.1 (H) 08/30/2023   , CMP:   Lab Results   Component Value Date    SODIUM 146 08/31/2023    K 3.7 08/31/2023     (H) 08/31/2023    CO2 19 (L) 08/31/2023    CO2 20 (L) 08/30/2023    BUN 41 (H) 08/31/2023    CREATININE 1.58 (H) 08/31/2023    GLUCOSE 59 (L) 08/30/2023    CALCIUM 9.7 08/31/2023     (H) 08/30/2023     (H) 08/30/2023    ALKPHOS 103 08/30/2023    EGFR 33 08/31/2023   , ABG:   Lab Results   Component Value Date    PHART 7.457 (H) 08/31/2023    ZJB1ZXW 32.4 (L) 08/31/2023    PO2ART 160.0 (H) 08/31/2023    MUT2OTL 22.4 08/31/2023    BEART -1.3 08/31/2023    SOURCE Line, Arterial 08/31/2023   , PT/INR:   Lab Results   Component Value Date    INR 2.34 (H) 08/30/2023   , Troponin: No results found for: "TROPONINI", Magnesium: No components found for: "MAG", Phosphorous: No results found for: "PHOS"  chest xrayI have personally reviewed pertinent reports. Assessment and Plan  · Diagnosis: Cardiogenic shock  · Plan: Continue Primacor and vasopressin  · Titrate to VBG consider calculating Robby  · Low-flow perfusion state  · Last echocardiogram with an EF of 15 to 20%, mild to moderate AR, mild MR, mild TR with moderate pulmonary hypertension  · Patient also has a large left-sided pleural effusion  · And appears to be intravascularly dry but extra vascularly overloaded      Billing Level:  During this visit, Critical care services were medically necessary as this patient had a high probability of imminent or life-threatening deterioration due to acute encephalopathy, stroke, acute congestive heart failure, acute respiratory failure, acute kidney injury, Renal failure and acidosis , required my direct attention, intervention, and personal management to implement the following: EKG interpretation, directing of titration of vasoactive drugs, fluid management, bedside management, test review, records review and direct patient care. I have personally provided 60 minutes on 08/30/2023 of critical care time, exclusive of procedures, teaching, family meetings, and any prior time recorded by providers other than myself.     SIGNATURE: Nasima Elias PA-C

## 2023-08-31 NOTE — PROGRESS NOTES
08/31/23 1300   Clinical Encounter Type   Visited With Family   Routine Visit Follow-up   Continue Visiting Yes   Family Spiritual Encounters   Family Coping Denial;Open/discussion

## 2023-08-31 NOTE — ASSESSMENT & PLAN NOTE
· Current hypoglycemia may be related to her liver failure  · S/p D 10  · No hypoglycemic episodes noted today.     Plan:  · Blood glucose checks every 2-3 hours  · Hypoglycemia protocol

## 2023-08-31 NOTE — PROGRESS NOTES
Pastoral Care Progress Note    2023  Patient: Riley Seth : 1955  Admission Date & Time: 8/10/2023 1448  MRN: [id number] CSN: [id number]      Spend time with daughter today, provided listening support, pastoral care presence, will continue to follow patient and support daughter.

## 2023-08-31 NOTE — ASSESSMENT & PLAN NOTE
Recent Labs     08/30/23  0542 08/31/23  0027 08/31/23  0421   CREATININE 1.28 1.58* 1.52*   EGFR 42 33 34     Estimated Creatinine Clearance: 33.7 mL/min (A) (by C-G formula based on SCr of 1.52 mg/dL (H)). Suspect this is likely in the setting of poor perfusion and cardiorenal syndrome. Plan;   Consider increasing milrinone dose. Unable to give Lasix at this time due to low BP in the setting of cardiogenic shock. Avoid nephrotoxic agents. Continue monitoring renal indices.

## 2023-08-31 NOTE — ASSESSMENT & PLAN NOTE
· Noted to be more altered on 8/26 with eye opening to name but not verbal, not following commands  · 8/27 Mental status wavering this morning  · Ammonia level was normal blood sugar was normal head cT without acute abnormality  · Liver enzymes elevated, no asterixis on exam    Plan:  · Continue monitoring fingerstick blood glucose every 2-3 hours- high risk for hypoglycemia  · Hypoglycemia protocol  · Frequent neurochecks

## 2023-08-31 NOTE — ASSESSMENT & PLAN NOTE
Patient has a history of low impact fall that resulted in multiple T-spine fractures  Currently pain controlled by morphine    Plan:  · Hold gabapentin  · She is on narcotics chronically, but has been hospitalized for about 2 weeks with variable use  · PRN fentanyl

## 2023-08-31 NOTE — ASSESSMENT & PLAN NOTE
· Patient was brought to the ED after being in altered mental status, requiring more oxygen. · History of multiple episodes of vomiting at night prior to admission  · Initially admitted with aspiration pneumonia, treated with a course of antibiotics and improved  · Remained on oxygen therapy, but developed a change in MS and required transfer back to the ICU  · She was noted to have an increase WOB and was placed on BIPAP  · This current episode does not appear to be related to an acute lung issue but more likely related to acute hepatic failure  · Patient is status postcardiac arrest, was intubated   · CT H - no change from 3 day ago       Plan:  · Vent : 16/400/6/50%  · goal is to maintain her oxygen saturation > 90%  · Not appropriate for extubation at this time.

## 2023-08-31 NOTE — ASSESSMENT & PLAN NOTE
Patient is a 65-year-old female  who was status postcardiac arrest with ROSC,   · It appeared patient became bradycardic and agonal respiration, patient arrested and CPR and ACLS protocol -CPR was started received epinephrine x 3, mag sulfate, bicarb x 2 , fibrillation, serum chloride dextrose presents from 927 to 939  · Patient now is a DNR level 2   · Currently on Milrinone 0.125mcg/kg/min and Vasopressin 0.04units/min  · Consider increasing milrinone dose.

## 2023-08-31 NOTE — PROGRESS NOTES
[address] [facility]  Progress Note  Name: Addie Gary  MRN: [id number]  Unit/Bed#: ICU 04 I Date of Admission: 8/10/2023   Date of Service: 8/31/2023 I Hospital Day: 21    Assessment/Plan   LAILA (acute kidney injury) [facility])  Assessment & Plan  Recent Labs     08/30/23  0542 08/31/23  0027 08/31/23  0421   CREATININE 1.28 1.58* 1.52*   EGFR 42 33 34     Estimated Creatinine Clearance: 33.7 mL/min (A) (by C-G formula based on SCr of 1.52 mg/dL (H)). Suspect this is likely in the setting of poor perfusion and cardiorenal syndrome. Plan;   Consider increasing milrinone dose. Unable to give Lasix at this time due to low BP in the setting of cardiogenic shock. Avoid nephrotoxic agents. Continue monitoring renal indices. Cardiac arrest [facility])  Assessment & Plan  Patient is a 76-year-old female  who was status postcardiac arrest with ROSC,   · It appeared patient became bradycardic and agonal respiration, patient arrested and CPR and ACLS protocol -CPR was started received epinephrine x 3, mag sulfate, bicarb x 2 , fibrillation, serum chloride dextrose presents from 927 to 939  · Patient now is a DNR level 2   · Currently on Milrinone 0.125mcg/kg/min and Vasopressin 0.04units/min  · Consider increasing milrinone dose.        Acute liver failure  Assessment & Plan  · The patient had a slight transaminitis during admission but over the past few days has steadily risen and then doubled on 8/26 with his AST 2800, ALT 1114  · Severe elevation of INR, out of proportion to liver injury  · Multifactorial - She was found to have a slightly elevated tylenol level (that continues to normalize) which may be a contributing factor however, not thought to be the only issue (ischemic vs viral hepatitis)  · She has HF, which may contribute to hepatotoxicity  · Additionally she was on a few drugs (Plaquinel, wellbutrin and lamictal) which can all be associated with liver failure  · Plaquinel and Wellbutrin held  · Doppler studies were normal, no thrombus  · Hepatitis panel on 8.24 was positive for hep C antibodies otherwise negative  · EBV ab/ag positive  · HSV, and VZV panels pending  · Started on acyclovir until HSV can be ruled out  · GI is following- appreciate their assistance with her care  · S/p NAC and fomepizole, dc on  8/28 as per Medical Toxicology  · Supportive care  · Vitamin K supplementation  · She would not be a candidate for a liver transplant  Recent Labs     08/29/23  1606 08/30/23  0542 08/31/23  0421   * 237* 91*   * 465* 194*   ALKPHOS 109* 103 50   TBILI 2.40* 2.63* 3.60*   BILIDIR  --  1.23*  --    ·   · LFTs now improving. Likely in the setting of shock liver vs acetaminophen toxicity. Hypoglycemia  Assessment & Plan  · Current hypoglycemia may be related to her liver failure  · S/p D 10  · No hypoglycemic episodes noted today.     Plan:  · Blood glucose checks every 2-3 hours  · Hypoglycemia protocol      Closed T12 fracture [facility])  Assessment & Plan  Patient has a history of low impact fall that resulted in multiple T-spine fractures  Currently pain controlled by morphine    Plan:  · Hold gabapentin  · She is on narcotics chronically, but has been hospitalized for about 2 weeks with variable use  · PRN fentanyl       Dysphagia  Assessment & Plan  · Most likely contributing to her respiratory status initially  · peg tube placed 8/17  · Tube feeds placed on hold due to leaking      Altered mental status  Assessment & Plan  · Noted to be more altered on 8/26 with eye opening to name but not verbal, not following commands  · 8/27 Mental status wavering this morning  · Ammonia level was normal blood sugar was normal head cT without acute abnormality  · Liver enzymes elevated, no asterixis on exam    Plan:  · Continue monitoring fingerstick blood glucose every 2-3 hours- high risk for hypoglycemia  · Hypoglycemia protocol  · Frequent neurochecks    History of stroke  Assessment & Plan  · Patient had right MCA stroke, s/p mechanical thrombectomy earlier this year  · Although the patient moves all the limbs, there is residual weakness in the left upper and lower limb  · CT head on August 8 shows No acute intracranial abnormality    Plan:  · Hold Eliquis for now given elevated INR in the setting of her liver failure  · Frequent neurochecks  · CTH remains stable       Hypercalcemia  Assessment & Plan  · 2/2 hyperparathyroidism  · follows with endocrine  · sensipar on hold 2/2 unable to crush    HFrEF (heart failure with reduced ejection fraction) ([facility])  Assessment & Plan  Wt Readings from Last 3 Encounters:   08/30/23 71 kg (156 lb 8.4 oz)   07/29/23 49.4 kg (108 lb 14.4 oz)   06/30/23 55.3 kg (122 lb)   8/27 CXR showed pulmonary congestion  - Positive fluid balance since admission might be affected by undocumented urine   - On PE euvolemic  - Lasix for 1 dose given  8/29 S/p cardiac arrest with ROSC   Repeat echo EF 15-20 %   Patient examines severely volume overloaded, however due to low BP at this time we are holding diuresis. Beta blocker on hold due to hypotension. Discontinue Albumin 25% 25g q6h. Rheumatoid arthritis of multiple sites with negative rheumatoid factor ([address])  Assessment & Plan  · Patient has a history of seronegative multiple joint arthropathy  · Currently on hydroxychloroquine 200 mg twice daily  · Patient follows up with rheumatologist    Plan:  · We are going to hold her plaquenil for now. Though she has been on it for a while, it can be associated with fulminant liver failure    Bipolar depression ([address])  Assessment & Plan  · no ativan  · On wellbutrin and duloxetine  · Wellbutrin was stopped secondary to hepatic toxicity risk  · cont gabapentin and lamictal. Lower dose started 2/2 sedation and uptitrate as able.   · Lamictal is also associated with hepatic toxicity but given her seizure risk, we will continue this for now      Anemia  Assessment & Plan  Recent Labs     08/30/23  0542 08/31/23  0421 08/31/23  0531   HGB 10.9* 6.4* 6.5*     Hgb today noted to be 6.4  1 unit of PRBCs ordered. No signs or symptoms of GI bleed, however there is new bruising over her knees bilaterally. Will check hemolysis and DIC labs. Consider CT C/A/P to assess for internal bleeding. * Acute respiratory failure with hypoxia [facility])  Assessment & Plan  · Patient was brought to the ED after being in altered mental status, requiring more oxygen. · History of multiple episodes of vomiting at night prior to admission  · Initially admitted with aspiration pneumonia, treated with a course of antibiotics and improved  · Remained on oxygen therapy, but developed a change in MS and required transfer back to the ICU  · She was noted to have an increase WOB and was placed on BIPAP  · This current episode does not appear to be related to an acute lung issue but more likely related to acute hepatic failure  · Patient is status postcardiac arrest, was intubated   · CT H - no change from 3 day ago       Plan:  · Vent : 16/400/6/50%  · goal is to maintain her oxygen saturation > 90%  · Not appropriate for extubation at this time. Electrolyte imbalance-resolved as of 8/23/2023  Assessment & Plan  · BMP on 8/11 shows K of 3.1  · Hypomagnesimia with Mg of 1.4  · Most likely due to poor oral intake  · Subjectively, the patient feels fine and at baseline    Plan:  · Potassium chloride 40mEq IVPB  · Magnesium sulfate 2 g IVPB once  · Recheck magnesium in a.m. draw  · Monitor BMP  q12hr     Sepsis (HCC)-resolved as of 8/22/2023  Assessment & Plan  -pt initially with lactate 2.7 now 2.0  -bc x 2 sent in ER  -levoquin started. Pt with multiple allergies  -recent admission to hospital and arc  -infiltrates noted on ct chest  -pt not given full ivf 2/2 HF.  Pt was given 250 bolus and lasix in ER  -HD stable at this time             ICU Core Measures     Vented Patient  VAP Bundle      A: Assess, Prevent, and Manage Pain · Has pain been assessed? Yes  · Need for changes to pain regimen? No   B: Both Spontaneous Awakening Trials (SATs) and Spontaneous Breathing Trials (SBTs) · Plan to perform spontaneous awakening trial today? Yes   · Plan to perform spontaneous breathing trial today? Yes   · Obvious barriers to extubation? Yes   C: Choice of Sedation · RASS Goal: -2 Light Sedation or 0 Alert and Calm  · Need for changes to sedation or analgesia regimen? No   D: Delirium · CAM-ICU: Negative   E: Early Mobility  · Plan for early mobility? Yes   F: Family Engagement · Plan for family engagement today? Yes       Review of Invasive Devices: [place] Plan: Continue for accurate I/O monitoring for 48 hours  Central access plan: Medications requiring central line  Verito Plan: Keep arterial line for hemodynamic monitoring, frequent ABGs and frequent labs    Prophylaxis:  VTE VTE covered by:    Jose C Hwang       Stress Ulcer  covered byomeprazole (PRILOSEC) suspension 2 mg/mL [[id number]]       Subjective   HPI/24hr events: Overnight patient initiated on epinephrine gtt, levophed gtt, milrinone initially at 0.250mcg/kg/min, now on 0.125mcg/kg/min and vasopressin. Epi and Levo have since been discontinued. She remains intubated and sedated, she opens her eyes to voice, however does not follow commands. Unable to obtain ROS due to patient intubated and sedated.         Objective                            Vitals I/O      Most Recent Min/Max in 24hrs   Temp (!) 97.2 °F (36.2 °C) Temp  Min: 96.1 °F (35.6 °C)  Max: 100 °F (37.8 °C)   Pulse (!) 108 Pulse  Min: 102  Max: 140   Resp 18 Resp  Min: 12  Max: 40   BP (!) 86/49 BP  Min: 86/49  Max: 114/80   O2 Sat 94 % SpO2  Min: 74 %  Max: 100 %      Intake/Output Summary (Last 24 hours) at 8/31/2023 0834  Last data filed at 8/31/2023 4301  Gross per 24 hour   Intake 1180.82 ml   Output 1205 ml   Net -24.18 ml         Diet Enteral/Parenteral; Tube Feeding No Oral Diet; Jevity 1.2 Prabhjot; Continuous; 44; 135; Every 4 hours     Invasive Monitoring Physical exam   Arterial Line  Verito BP 94/42  Arterial Line BP  Min: 80/42  Max: 154/60   MAP (!) 62 mmHg  Arterial Line MAP (mmHg)  Min: 52 mmHg  Max: 86 mmHg    Physical Exam  Vitals and nursing note reviewed. Constitutional:       General: She is not in acute distress. Appearance: She is ill-appearing. Interventions: She is intubated. HENT:      Head: Normocephalic and atraumatic. Nose: Nose normal.      Mouth/Throat:      Mouth: Mucous membranes are dry. Pharynx: No posterior oropharyngeal erythema. Comments: Dried blood coating patient's pharynx. Eyes:      General: No scleral icterus. Right eye: No discharge. Left eye: No discharge. Conjunctiva/sclera: Conjunctivae normal.      Pupils: Pupils are equal, round, and reactive to light. Cardiovascular:      Rate and Rhythm: Regular rhythm. Tachycardia present. Pulses:           Dorsalis pedis pulses are 0 on the right side and 0 on the left side. Heart sounds: Normal heart sounds. No murmur heard. No friction rub. No gallop. Pulmonary:      Effort: Pulmonary effort is normal. No respiratory distress. She is intubated. Breath sounds: Normal breath sounds. No wheezing, rhonchi or rales. Abdominal:      General: Abdomen is flat. Bowel sounds are normal. There is no distension. Palpations: Abdomen is soft. Tenderness: There is no abdominal tenderness. There is no guarding or rebound. Musculoskeletal:      Right lower leg: Edema present. Left lower leg: Edema present. Comments: 2+ pitting edema in the upper and lower extremities. Skin tear ~7cm located on the right forearm. Skin:     General: Skin is warm and dry. Findings: Bruising (upper and lower extremities. ) present. Comments: Toes bilaterally appear ischemic.     Neurological:      Mental Status: She is unresponsive. GCS: GCS eye subscore is 3. GCS verbal subscore is 1. GCS motor subscore is 4. Cranial Nerves: No facial asymmetry. Diagnostic Studies      EKG: Sinus tachycardia,   Imaging:  I have personally reviewed pertinent reports. and I have personally reviewed pertinent films in PACS     Medications:  Scheduled PRN   Albumin 25%, 25 g, Q6H KHARI  DULoxetine, 60 mg, BID  fentanyl citrate (PF), 50 mcg, Once  gabapentin, 100 mg, HS  ipratropium-albuterol, 3 mL, TID  lamoTRIgine, 50 mg, HS  lidocaine, 1 patch, Daily  metoprolol tartrate, 12.5 mg, Q12H KHARI  omeprazole (PRILOSEC) suspension 2 mg/mL, 20 mg, Daily      aluminum-magnesium hydroxide-simethicone, 30 mL, Q4H PRN  fentanyl citrate (PF), 50 mcg, Q2H PRN  ondansetron, 4 mg, Q6H PRN       Continuous    epinephrine, 1-10 mcg/min, Last Rate: Stopped (08/30/23 2231)  milrinone (Primacor) infusion, 0.25 mcg/kg/min, Last Rate: 0.125 mcg/kg/min (08/31/23 0030)  norepinephrine, 1-30 mcg/min, Last Rate: Stopped (08/30/23 2231)  vasopressin, 0.04 Units/min, Last Rate: 0.04 Units/min (08/31/23 0046)         Labs:    CBC    Recent Labs     08/29/23  0949 08/29/23  1606 08/31/23  0421 08/31/23  0531   WBC 8.48   < > 12.03* 12.76*   HGB 7.9*   < > 6.4* 6.5*   HCT 26.8*   < > 20.9* 21.8*   PLT  --    < > 41* 40*   BANDSPCT 1  --   --   --     < > = values in this interval not displayed.      BMP    Recent Labs     08/31/23  0027 08/31/23 0421   SODIUM 146 146   K 3.7 3.8   * 111*   CO2 19* 22   AGAP 16 13   BUN 41* 43*   CREATININE 1.58* 1.52*   CALCIUM 9.7 9.8       Coags    Recent Labs     08/30/23  0542   INR 2.34*        Additional Electrolytes  Recent Labs     08/29/23  1606 08/30/23  0507 08/30/23  0542 08/31/23  0421   MG 3.0*  --  2.6 2.3   PHOS 3.5  --   --  4.4*   CAIONIZED  --  1.28 1.21  --           Blood Gas    Recent Labs     08/31/23  0409   PHART 7.457*   QCE2BRH 32.4*   PO2ART 160.0*   [id number] 22.4   BEART -1.3   SOURCE Line, Arterial     Recent Labs     08/31/23  0409 08/31/23  0421   PHVEN  --  7.417*   [id number]  --  35.9*   PO2VEN  --  46.2*   [id number]  --  22.6*   BEVEN  --  -1.7   SOURCE Line, Arterial  --     LFTs  Recent Labs     08/30/23  0542 08/31/23  0421   * 194*   * 91*   ALKPHOS 103 50   ALB 2.2* 3.1*   TBILI 2.63* 3.60*       Infectious  No recent results  Glucose  Recent Labs     08/29/23  1606 08/30/23  0542 08/31/23  0027 08/31/23  0421   GLUC 84 72 119 148*               Cathy Clayton MD

## 2023-08-31 NOTE — PROGRESS NOTES
Progress Note -  [facility]  Darnell Arroyo 70 y.o. female MRN: [id number]  Unit/Bed#: ICU 04 Encounter: [id number]      ASSESSMENT AND PLAN:      59-year-old female with past medical history of undifferentiated connective tissue disease, rheumatoid arthritis, bipolar depression, recent CVA 5 months ago status post PEG tube who was admitted for acute hypoxic respiratory failure. She is now status postcardiac arrest and on ventilator. GI is consulted for elevated liver enzymes.     1. Acute liver injury  2. Elevated liver enzymes  3. Hepatitis C antibody positive  Unclear etiology though given his severely elevated Tylenol level suspect Tylenol toxicity. Other etiologies include ischemic hepatitis versus viral causes. Acute hepatitis panel positive for hep C antibody. Overall liver enzymes are improving with NAC/omeprazole INR also improved with administration of vitamin K. .  Right upper quadrant ultrasound shows small volume ascites but otherwise unremarkable. CT scan shows heterogeneous liver. HSV, VZV negative. EBV shows old infection. Hep C RNA negative indicating cleared infection. LFTs resolving. • Monitor LFTs, INR daily.     4. Macrocytic anemia  Hemoglobin acutely dropped to 6.8 this morning, baseline 10-11. Macrocytic. Vitamin B12 and folate normal.  TSH normal.   Also noted to have 4.3 x 2.6 cm fluid collection in left lateral hip which may be hematoma on CT. No signs of overt GI bleeding. Iron panel consistent with anemia of chronic disease. • Recommend reimaging to evaluate fluid collection left lateral hip. • Monitor for signs of overt GI bleeding. • Follow-up hemolysis labs and DIC panel. • Monitor hemoglobin, transfuse for less than 7.     5. CVA with dysphagia  6. Status post PEG tube  PEG tube site clean and intact. • Okay to continue tube feeds from GI perspective.   • Anticoagulation held in the setting of acute drop in hemoglobin.     Gastroenterology to sign off at this time. Please contact my questions. Rest of care per primary team.    ______________________________________________________________________    Subjective: Seen and examined. Intubated and sedated. No episodes of bleeding including melena, hematochezia, hematemesis overnight per nursing staff.     REVIEW OF SYSTEMS:    Review of Systems   Unable to perform ROS: Intubated          Historical Information   Past Medical History:   Diagnosis Date   • Anxiety    • Asthma    • Bipolar depression ([address])    • Chronic narcotic dependence ([address])    • Chronic pain    • Depression    • Enterovirus heart infection    • SHAR (obstructive sleep apnea)    • Osteoarthritis    • Peripheral neuropathy    • Plantar fasciitis of right foot    • Senile osteoporosis    • Seronegative arthropathy of multiple sites ([address])    • Undifferentiated connective tissue disease (HCC)      Past Surgical History:   Procedure Laterality Date   • BREAST IMPLANT     • FIXATION KYPHOPLASTY LUMBAR SPINE     • HYSTERECTOMY     • INCONTINENCE SURGERY N/A    • IR STROKE ALERT  2/12/2023   • NASAL SEPTOPLASTY W/ TURBINOPLASTY N/A    • RECTAL PROLAPSE REPAIR N/A    • REPAIR RECTOCELE     • RHINOPLASTY N/A      Social History   Social History     Substance and Sexual Activity   Alcohol Use Not Currently    Comment: quit     Social History     Substance and Sexual Activity   Drug Use Not Currently   • Types: Marijuana    Comment: has medical card     Social History     Tobacco Use   Smoking Status Former   Smokeless Tobacco Never     Family History   Problem Relation Age of Onset   • No Known Problems Mother    • No Known Problems Father    • Arthritis Family    • Diabetes Family    • Stroke Family        Meds/Allergies     Medications Prior to Admission   Medication   • acetaminophen (TYLENOL) 325 mg tablet   • albuterol (PROVENTIL HFA,VENTOLIN HFA) 90 mcg/act inhaler   • apixaban (ELIQUIS) 5 mg   • buPROPion (WELLBUTRIN) 100 mg tablet   • cinacalcet (SENSIPAR) 30 mg tablet   • Diclofenac Sodium (VOLTAREN) 1 %   • docusate sodium (COLACE) 100 mg capsule   • DULoxetine (CYMBALTA) 60 mg delayed release capsule   • gabapentin (NEURONTIN) 300 mg capsule   • hydroxychloroquine (PLAQUENIL) 200 mg tablet   • lamoTRIgine (LaMICtal) 150 MG tablet   • lidocaine (LIDODERM) 5 %   • magnesium oxide (MAG-OX) 400 mg tablet   • melatonin 3 mg   • metoprolol succinate (TOPROL-XL) 25 mg 24 hr tablet   • morphine (MSIR) 15 mg tablet   • pantoprazole (PROTONIX) 40 mg tablet   • senna (SENOKOT) 8.6 mg     Current Facility-Administered Medications   Medication Dose Route Frequency   • albumin human (FLEXBUMIN) 25 % injection 25 g  25 g Intravenous Q6H [address]   • aluminum-magnesium hydroxide-simethicone (MAALOX) oral suspension 30 mL  30 mL Oral Q4H PRN   • DULoxetine (CYMBALTA) delayed release capsule 60 mg  60 mg Per G Tube BID   • EPINEPHrine 5,000 mcg (STANDARD CONCENTRATION) IV in sodium chloride 0.9% 250 mL  1-10 mcg/min Intravenous Titrated   • fentanyl citrate (PF) 100 MCG/2ML 50 mcg  50 mcg Intravenous Once   • fentanyl citrate (PF) 100 MCG/2ML 50 mcg  50 mcg Intravenous Q2H PRN   • gabapentin (NEURONTIN) oral solution 100 mg  100 mg Oral HS   • ipratropium-albuterol (DUO-NEB) 0.5-2.5 mg/3 mL inhalation solution 3 mL  3 mL Nebulization TID   • lamoTRIgine (LaMICtal) tablet 50 mg  50 mg Oral HS   • lidocaine (LIDODERM) 5 % patch 1 patch  1 patch Topical Daily   • metoprolol tartrate (LOPRESSOR) partial tablet 12.5 mg  12.5 mg Per G Tube Q12H [address]   • milrinone (PRIMACOR) 20 mg in 100 mL infusion (premix)  0.25 mcg/kg/min Intravenous Continuous   • NOREPINEPHRINE 4 MG  ML NSS (CMPD ORDER) infusion  1-30 mcg/min Intravenous Titrated   • omeprazole (PRILOSEC) suspension 2 mg/mL  20 mg Per G Tube Daily   • ondansetron (ZOFRAN) injection 4 mg  4 mg Intravenous Q6H PRN   • vasopressin (PITRESSIN) 20 Units in sodium chloride 0.9 % 100 mL infusion  0.04 Units/min Intravenous Continuous       Allergies   Allergen Reactions   • Ativan [Lorazepam] Other (See Comments)     Per pt daughter   • Cephalosporins Other (See Comments)     rash   • Iodine - Food Allergy Anaphylaxis     Anaphylaxis     • Methocarbamol Hives   • Sulfa Antibiotics Hives   • Bactrim [Sulfamethoxazole-Trimethoprim] Hives   • Erythromycin Hives   • Penicillins    • Shrimp Extract Allergy Skin Test - Food Allergy Other (See Comments)   • Trimethoprim Hives           Objective     Blood pressure (!) 86/49, pulse (!) 108, temperature (!) 97.2 °F (36.2 °C), resp. rate 20, height 5' 4" (1.626 m), weight 71 kg (156 lb 8.4 oz), SpO2 94 %. Body mass index is 26.87 kg/m². Intake/Output Summary (Last 24 hours) at 8/31/2023 0713  Last data filed at 8/31/2023 9598  Gross per 24 hour   Intake 1262.74 ml   Output 1365 ml   Net -102.26 ml         PHYSICAL EXAM:      Physical Exam  Vitals and nursing note reviewed. Constitutional:       General: She is not in acute distress. Appearance: Normal appearance. She is ill-appearing. Interventions: She is sedated and intubated. HENT:      Head: Normocephalic and atraumatic. Mouth/Throat:      Mouth: Mucous membranes are moist.   Eyes:      Extraocular Movements: Extraocular movements intact. Conjunctiva/sclera: Conjunctivae normal.   Cardiovascular:      Pulses: Normal pulses. Pulmonary:      Effort: Pulmonary effort is normal. She is intubated. Abdominal:      General: Abdomen is flat. Bowel sounds are normal. There is no distension. Palpations: Abdomen is soft. Tenderness: There is no abdominal tenderness. There is no guarding. Skin:     General: Skin is warm and dry. Neurological:      General: No focal deficit present. Lab Results:   No results displayed because visit has over 200 results. Imaging Studies: I have personally reviewed pertinent imaging studies. [address] [place] [address]   Gastroenterology Fellow  [id number]  Available via [facility]  8/31/2023 7:13 AM

## 2023-08-31 NOTE — PLAN OF CARE
Problem: Potential for Falls  Goal: Patient will remain free of falls  Description: INTERVENTIONS:  - Educate patient/family on patient safety including physical limitations  - Instruct patient to call for assistance with activity   - Consult OT/PT to assist with strengthening/mobility   - Keep Call bell within reach  - Keep bed low and locked with side rails adjusted as appropriate  - Keep care items and personal belongings within reach  - Initiate and maintain comfort rounds  - Make Fall Risk Sign visible to staff  - Offer Toileting every 2 Hours, in advance of need  - Initiate/Maintain bed/chair alarm  - Obtain necessary fall risk management equipment: bed/chair alarm, call bell, gripper socks, walker, bedside commode  - Apply yellow socks and bracelet for high fall risk patients  - Consider moving patient to room near nurses station  8/31/2023 1023 by Gisele Duval RN  Outcome: Progressing  8/31/2023 1023 by Gisele Duval RN  Outcome: Progressing     Problem: MOBILITY - ADULT  Goal: Maintain or return to baseline ADL function  Description: INTERVENTIONS:  - Educate patient/family on patient safety including physical limitations  - Instruct patient to call for assistance with activity   - Consult OT/PT to assist with strengthening/mobility   - Keep Call bell within reach  - Keep bed low and locked with side rails adjusted as appropriate  - Keep care items and personal belongings within reach  - Initiate and maintain comfort rounds  - Make Fall Risk Sign visible to staff  - Offer Toileting every 2 Hours, in advance of need  - Initiate/Maintain bed/chair alarm  - Obtain necessary fall risk management equipment: bed/chair alarm, call bell, gripper socks, walker, bedside commode  - Apply yellow socks and bracelet for high fall risk patients  - Consider moving patient to room near nurses station  8/31/2023 1023 by Gisele Duval RN  Outcome: Progressing  8/31/2023 1023 by Gisele Duval RN  Outcome: Progressing  Goal: Maintains/Returns to pre admission functional level  Description: INTERVENTIONS:  - Perform BMAT or MOVE assessment daily.   - Set and communicate daily mobility goal to care team and patient. - Collaborate with rehabilitation services on mobility goals if consulted  - Reposition patient every 2 hours.   - Dangle patient 3 times a day  - Stand patient 3 times a day  - Ambulate patient 3 times a day  - Out of bed to chair 3 times a day   - Out of bed for meals  - Out of bed for toileting  - Record patient progress and toleration of activity level   8/31/2023 1023 by Daniel Ventura RN  Outcome: Progressing  8/31/2023 1023 by Daniel Ventura RN  Outcome: Progressing     Problem: PAIN - ADULT  Goal: Verbalizes/displays adequate comfort level or baseline comfort level  Description: Interventions:  - Encourage patient to monitor pain and request assistance  - Assess pain using appropriate pain scale  - Administer analgesics based on type and severity of pain and evaluate response  - Implement non-pharmacological measures as appropriate and evaluate response  - Consider cultural and social influences on pain and pain management  - Notify physician/advanced practitioner if interventions unsuccessful or patient reports new pain  8/31/2023 1023 by Daniel Ventura RN  Outcome: Progressing  8/31/2023 1023 by Daniel Ventura RN  Outcome: Progressing     Problem: INFECTION - ADULT  Goal: Absence or prevention of progression during hospitalization  Description: INTERVENTIONS:  - Assess and monitor for signs and symptoms of infection  - Monitor lab/diagnostic results  - Monitor all insertion sites, i.e. indwelling lines, tubes, and drains  - Monitor endotracheal if appropriate and nasal secretions for changes in amount and color  - [place] appropriate cooling/warming therapies per order  - Administer medications as ordered  - Instruct and encourage patient and family to use good hand hygiene technique  - Identify and instruct in appropriate isolation precautions (contact isolation for CRE) for identified infection/condition  8/31/2023 1023 by Collins Perez RN  Outcome: Progressing  8/31/2023 1023 by Collins Perez RN  Outcome: Progressing     Problem: SAFETY ADULT  Goal: Patient will remain free of falls  Description: INTERVENTIONS:  - Educate patient/family on patient safety including physical limitations  - Instruct patient to call for assistance with activity   - Consult OT/PT to assist with strengthening/mobility   - Keep Call bell within reach  - Keep bed low and locked with side rails adjusted as appropriate  - Keep care items and personal belongings within reach  - Initiate and maintain comfort rounds  - Make Fall Risk Sign visible to staff  - Offer Toileting every 2 Hours, in advance of need  - Initiate/Maintain bed/chair alarm  - Obtain necessary fall risk management equipment: bed/chair alarm, call bell, gripper socks, walker, bedside commode  - Apply yellow socks and bracelet for high fall risk patients  - Consider moving patient to room near nurses station  8/31/2023 1023 by Collins Perez RN  Outcome: Progressing  8/31/2023 1023 by Collins Perez RN  Outcome: Progressing  Goal: Maintain or return to baseline ADL function  Description: INTERVENTIONS:  - Educate patient/family on patient safety including physical limitations  - Instruct patient to call for assistance with activity   - Consult OT/PT to assist with strengthening/mobility   - Keep Call bell within reach  - Keep bed low and locked with side rails adjusted as appropriate  - Keep care items and personal belongings within reach  - Initiate and maintain comfort rounds  - Make Fall Risk Sign visible to staff  - Offer Toileting every 2 Hours, in advance of need  - Initiate/Maintain bed/chair alarm  - Obtain necessary fall risk management equipment: bed/chair alarm, call bell, gripper socks, walker, bedside commode  - Apply yellow socks and bracelet for high fall risk patients  - Consider moving patient to room near nurses station  8/31/2023 1023 by Isaias Hutchinson RN  Outcome: Progressing  8/31/2023 1023 by Isaias Hutchinson RN  Outcome: Progressing  Goal: Maintains/Returns to pre admission functional level  Description: INTERVENTIONS:  - Perform BMAT or MOVE assessment daily.   - Set and communicate daily mobility goal to care team and patient. - Collaborate with rehabilitation services on mobility goals if consulted  - Reposition patient every 2 hours. - Dangle patient 3 times a day  - Stand patient 3 times a day  - Ambulate patient 3 times a day  - Out of bed to chair 3 times a day   - Out of bed for meals  - Out of bed for toileting  - Record patient progress and toleration of activity level   8/31/2023 1023 by Isaias Hutchinson RN  Outcome: Progressing  8/31/2023 1023 by Isaias Hutchinson RN  Outcome: Progressing     Problem: DISCHARGE PLANNING  Goal: Discharge to home or other facility with appropriate resources  Description: INTERVENTIONS:  - Identify barriers to discharge w/patient  - Arrange for needed discharge resources and transportation as appropriate  - Identify discharge learning needs   - Refer to Case Management Department for coordinating discharge planning if the patient needs post-hospital services based on physician/advanced practitioner order or complex needs related to functional status, cognitive ability, or social support system  8/31/2023 1023 by Isaias Hutchinson RN  Outcome: Progressing  8/31/2023 1023 by Isaias Hutchinson RN  Outcome: Progressing     Problem: Knowledge Deficit  Goal: Patient/family/caregiver demonstrates understanding of disease process, treatment plan, medications, and discharge instructions  Description: Complete learning assessment and assess knowledge base.   Interventions:  - Provide teaching at level of understanding  - Provide teaching via preferred learning methods  8/31/2023 1023 by Isaias Hutchinson RN  Outcome: Progressing  8/31/2023 1023 by Odilia Lenz RN  Outcome: Progressing     Problem: Prexisting or High Potential for Compromised Skin Integrity  Goal: Skin integrity is maintained or improved  Description: INTERVENTIONS:  - Identify patients at risk for skin breakdown  - Assess and monitor skin integrity  - Assess and monitor nutrition and hydration status  - Monitor labs   - Assess for incontinence   - Turn and reposition patient  - Assist with mobility/ambulation  - Relieve pressure over bony prominences  - Avoid friction and shearing  - Provide appropriate hygiene as needed including keeping skin clean and dry  - Evaluate need for skin moisturizer/barrier cream  - Collaborate with interdisciplinary team   - Patient/family teaching  - Consider wound care consult   8/31/2023 1023 by Odilia Lenz RN  Outcome: Progressing  8/31/2023 1023 by Odilia Lenz RN  Outcome: Progressing     Problem: Nutrition/Hydration-ADULT  Goal: Nutrient/Hydration intake appropriate for improving, restoring or maintaining nutritional needs  Description: Monitor and assess patient's nutrition/hydration status for malnutrition. Collaborate with interdisciplinary team and initiate plan and interventions as ordered. Monitor patient's weight and dietary intake as ordered or per policy. Utilize nutrition screening tool and intervene as necessary. Determine patient's food preferences and provide high-protein, high-caloric foods as appropriate.      INTERVENTIONS:  - Monitor oral intake, urinary output, labs, and treatment plans  - Assess nutrition and hydration status and recommend course of action  - Evaluate amount of meals eaten  - Do not leave patient alone while eating due to aspiration risk  - Assist patient with eating if necessary   - Allow adequate time for meals  - Recommend/ encourage appropriate diets, oral nutritional supplements, and vitamin/mineral supplements  - Order, calculate, and assess calorie counts as needed  - Assess need for intravenous fluids  - Provide specific nutrition/hydration education as appropriate  - Include patient in decisions related to nutrition  8/31/2023 1023 by Bebe Hinson RN  Outcome: Progressing  8/31/2023 1023 by Bebe Hinson RN  Outcome: Progressing     Problem: RESPIRATORY - ADULT  Goal: Achieves optimal ventilation and oxygenation  Description: INTERVENTIONS:  - Assess for changes in respiratory status  - Assess for changes in mentation and behavior  - Position to facilitate oxygenation and minimize respiratory effort  - Oxygen administered by appropriate delivery if ordered   - Encourage broncho-pulmonary hygiene including cough, deep breathe, Incentive Spirometry, flutter valve  - Assess the need for suctioning and aspirate as needed  - Assess and instruct to report SOB or any respiratory difficulty  - Respiratory Therapy support as indicated  8/31/2023 1023 by Bebe Hinson RN  Outcome: Progressing  8/31/2023 1023 by Bebe Hinson RN  Outcome: Progressing     Problem: GENITOURINARY - ADULT  Goal: Maintains or returns to baseline urinary function  Description: INTERVENTIONS:  - Assess urinary function  - Encourage oral fluids to ensure adequate hydration if ordered  - Administer IV fluids as ordered to ensure adequate hydration  - Administer ordered medications as needed  - Offer frequent toileting  - Follow urinary retention protocol if ordered  8/31/2023 1023 by Bebe Hinson RN  Outcome: Progressing  8/31/2023 1023 by Bebe Hinson RN  Outcome: Progressing  Goal: Urinary catheter remains patent  Description: INTERVENTIONS:  - Assess patency of urinary catheter  - Follow guidelines for intermittent irrigation of non-functioning urinary catheter  8/31/2023 1023 by Bebe Hinson RN  Outcome: Progressing  8/31/2023 1023 by Bebe Hinson RN  Outcome: Progressing     Problem: SAFETY,RESTRAINT: NV/NON-SELF DESTRUCTIVE BEHAVIOR  Goal: Remains free of harm/injury (restraint for non violent/non self-detsructive behavior)  Description: INTERVENTIONS:  - Instruct patient/family regarding restraint use   - Assess and monitor physiologic and psychological status   - Provide interventions and comfort measures to meet assessed patient needs   - Identify and implement measures to help patient regain control  - Assess readiness for release of restraint   8/31/2023 1023 by Sanju Vazquez RN  Outcome: Progressing  8/31/2023 1023 by Sanju Vazquez RN  Outcome: Progressing  Goal: Returns to optimal restraint-free functioning  Description: INTERVENTIONS:  - Assess the patient's behavior and symptoms that indicate continued need for restraint  - Identify and implement measures to help patient regain control  - Assess readiness for release of restraint   8/31/2023 1023 by Sanju Vazquez RN  Outcome: Progressing  8/31/2023 1023 by Sanju Vazquez RN  Outcome: Progressing     Problem: NEUROSENSORY - ADULT  Goal: Achieves stable or improved neurological status  Description: INTERVENTIONS  - Monitor and report changes in neurological status  - Monitor vital signs such as temperature, blood pressure, glucose, and any other labs ordered   - Initiate measures to prevent increased intracranial pressure  - Monitor for seizure activity and implement precautions if appropriate      8/31/2023 1023 by Sanju Vazquez RN  Outcome: Progressing  8/31/2023 1023 by Sanju Vazquez RN  Outcome: Progressing  Goal: Achieves maximal functionality and self care  Description: INTERVENTIONS  - Monitor swallowing and airway patency with patient fatigue and changes in neurological status  - Encourage and assist patient to increase activity and self care.    - Encourage visually impaired, hearing impaired and aphasic patients to use assistive/communication devices  8/31/2023 1023 by Sanju Vazquez RN  Outcome: Progressing  8/31/2023 1023 by Sanju Vazquez RN  Outcome: Progressing     Problem: GASTROINTESTINAL - ADULT  Goal: Maintains adequate nutritional intake  Description: INTERVENTIONS:  - Monitor percentage of each meal consumed  - Identify factors contributing to decreased intake, treat as appropriate  - Assist with meals as needed  - Monitor I&O, weight, and lab values if indicated  - Obtain nutrition services referral as needed  8/31/2023 1023 by Skyler Steven RN  Outcome: Progressing  8/31/2023 1023 by Skyler Steven RN  Outcome: Progressing  Goal: Establish and maintain optimal ostomy function  Description: INTERVENTIONS:  - Assess bowel function  - Encourage oral fluids to ensure adequate hydration  - Administer IV fluids if ordered to ensure adequate hydration   - Administer ordered medications as needed  - Encourage mobilization and activity  - Nutrition services referral to assist patient with appropriate food choices  - Assess stoma site  - Consider wound care consult   8/31/2023 1023 by Skyler Steven RN  Outcome: Progressing  8/31/2023 1023 by Skyler Steven RN  Outcome: Progressing     Problem: METABOLIC, FLUID AND ELECTROLYTES - ADULT  Goal: Electrolytes maintained within normal limits  Description: INTERVENTIONS:  - Monitor labs and assess patient for signs and symptoms of electrolyte imbalances  - Administer electrolyte replacement as ordered  - Monitor response to electrolyte replacements, including repeat lab results as appropriate  - Instruct patient on fluid and nutrition as appropriate  8/31/2023 1023 by Skyler Stveen RN  Outcome: Progressing  8/31/2023 1023 by Skyler Steven RN  Outcome: Progressing  Goal: Fluid balance maintained  Description: INTERVENTIONS:  - Monitor labs   - Monitor I/O and WT  - Instruct patient on fluid and nutrition as appropriate  - Assess for signs & symptoms of volume excess or deficit  8/31/2023 1023 by Skyler Steven RN  Outcome: Progressing  8/31/2023 1023 by Skyler Steven RN  Outcome: Progressing  Goal: Glucose maintained within target range  Description: INTERVENTIONS:  - Monitor Blood Glucose as ordered  - Assess for signs and symptoms of hyperglycemia and hypoglycemia  - Administer ordered medications to maintain glucose within target range  - Assess nutritional intake and initiate nutrition service referral as needed  8/31/2023 1023 by Cherl Boast, RN  Outcome: Progressing  8/31/2023 1023 by Cherl Boast, RN  Outcome: Progressing     Problem: SKIN/TISSUE INTEGRITY - ADULT  Goal: Skin Integrity remains intact(Skin Breakdown Prevention)  Description: Assess:  -Perform Paul assessment every   -Clean and moisturize skin every   -Inspect skin when repositioning, toileting, and assisting with ADLS  -Assess under medical devices such as  every   -Assess extremities for adequate circulation and sensation     Bed Management:  -Have minimal linens on bed & keep smooth, unwrinkled  -Change linens as needed when moist or perspiring  -Avoid sitting or lying in one position for more than  hours while in bed  -Keep HOB at degrees     Toileting:  -Offer bedside commode  -Assess for incontinence every   -Use incontinent care products after each incontinent episode such as     Activity:  -Mobilize patient  times a day  -Encourage activity and walks on unit  -Encourage or provide ROM exercises   -Turn and reposition patient every  Hours  -Use appropriate equipment to lift or move patient in bed  -Instruct/ Assist with weight shifting every  when out of bed in chair  -Consider limitation of chair time  hour intervals    Skin Care:  -Avoid use of baby powder, tape, friction and shearing, hot water or constrictive clothing  -Relieve pressure over bony prominences using   -Do not massage red bony areas    Next Steps:  -Teach patient strategies to minimize risks such as    -Consider consults to  interdisciplinary teams such as   8/31/2023 1023 by Cherl Boast, RN  Outcome: Progressing  8/31/2023 1023 by Cherl Boast, RN  Outcome: Progressing  Goal: Incision(s), wounds(s) or drain site(s) healing without S/S of infection  Description: INTERVENTIONS  - Assess and document dressing, incision, wound bed, drain sites and surrounding tissue  - Provide patient and family education  - Perform skin care/dressing changes every  8/31/2023 1023 by Bebe Hinson RN  Outcome: Progressing  8/31/2023 1023 by Bebe Hinson RN  Outcome: Progressing     Problem: HEMATOLOGIC - ADULT  Goal: Maintains hematologic stability  Description: INTERVENTIONS  - Assess for signs and symptoms of bleeding or hemorrhage  - Monitor labs  - Administer supportive blood products/factors as ordered and appropriate  Outcome: Progressing

## 2023-08-31 NOTE — PHYSICIAN ADVISOR
Current patient class: Inpatient  The patient is currently on Hospital Day: 22      The patient was admitted to the hospital at  9:33 PM on 8/10/23 for the following diagnosis:  Altered mental state [R41.82]  Respiratory distress [R06.03]  Hypertension [I10]  Encephalopathy [G93.40]  History of CVA (cerebrovascular accident) [Z86.73]  Acute respiratory failure with hypoxia ([address]) [J96.01]  Sepsis ([address]) [A41.9]     CMS OUTLIER STAY REVIEW    After review of the relevant documentation, labs, vital signs and test results, the patient is appropriate for CONTINUED INPATIENT ADMISSION. The patient continues to remain hospitalized receiving acute medical care. The patient has surpassed the expected duration of stay, however given the clinical condition, need for further acute care management, the patient is appropriate to remain in an inpatient status. The patient still being actively managed, and does have unresolved medical issues requiring further hospitalization. This review is conducted at 20 days, to help satisfy the requirements for significant outlier stay review as per CMS. Given the current condition of this patient, the patient satisfies this review was determination for continued inpatient stay. Rationale is as follows: The patient is requiring ICU level care for conditions including cardiac arrest with ROSC, acute liver failure, ARF requiring mechanical ventilation, EF 15-20 % on milrinone infusion and vasopressin- blood pressure has been low.      The patient’s vitals on arrival were   ED Triage Vitals   Temperature Pulse Respirations Blood Pressure SpO2   08/10/23 1533 08/10/23 1453 08/10/23 1453 08/10/23 1453 08/10/23 1453   (!) 96.2 °F (35.7 °C) 62 (!) 28 133/61 92 %      Temp Source Heart Rate Source Patient Position - Orthostatic VS BP Location FiO2 (%)   08/10/23 1533 08/10/23 1453 08/10/23 1453 08/10/23 1453 08/22/23 1948   Axillary Monitor Sitting Right arm 90      Pain Score 08/11/23 0500       6           Past Medical History:   Diagnosis Date   • Anxiety    • Asthma    • Bipolar depression ([address])    • Chronic narcotic dependence ([address])    • Chronic pain    • Depression    • Enterovirus heart infection    • SHRA (obstructive sleep apnea)    • Osteoarthritis    • Peripheral neuropathy    • Plantar fasciitis of right foot    • Senile osteoporosis    • Seronegative arthropathy of multiple sites ([address])    • Undifferentiated connective tissue disease ([address])      Past Surgical History:   Procedure Laterality Date   • BREAST IMPLANT     • FIXATION KYPHOPLASTY LUMBAR SPINE     • HYSTERECTOMY     • INCONTINENCE SURGERY N/A    • IR STROKE ALERT  2/12/2023   • NASAL SEPTOPLASTY W/ TURBINOPLASTY N/A    • RECTAL PROLAPSE REPAIR N/A    • REPAIR RECTOCELE     • RHINOPLASTY N/A            Consults have been placed to:   IP CONSULT TO CASE MANAGEMENT  IP CONSULT TO NUTRITION SERVICES  IP CONSULT TO GASTROENTEROLOGY  IP CONSULT TO PICC TEAM  IP CONSULT TO NUTRITION SERVICES  IP CONSULT TO TOXICOLOGY    Vitals:    08/31/23 1126 08/31/23 1130 08/31/23 1136 08/31/23 1200   BP: (!) 104/49  (!) 102/47    Pulse: (!) 107 (!) 106 (!) 108 (!) 106   Resp: 18 (!) 11 (!) 8 (!) 6   Temp: 97.5 °F (36.4 °C) 97.5 °F (36.4 °C) 97.5 °F (36.4 °C) 97.5 °F (36.4 °C)   TempSrc:   Bladder    SpO2:       Weight:       Height:           Most recent labs:    Recent Labs     08/31/23  0421 08/31/23  0531 08/31/23  0756   WBC 12.03* 12.76*  --    HGB 6.4* 6.5*  --    HCT 20.9* 21.8*  --    PLT 41* 40* 41*   K 3.8  --   --    CALCIUM 9.8  --   --    BUN 43*  --   --    CREATININE 1.52*  --   --    INR  --   --  3.57*   AST 91*  --   --    *  --   --    ALKPHOS 50  --   --        Scheduled Meds:  Current Facility-Administered Medications   Medication Dose Route Frequency Provider Last Rate   • DULoxetine  60 mg Per G Tube BID LAURA Ballard     • fentanyl citrate (PF)  50 mcg Intravenous Once Papo Granger MD     • fentanyl citrate (PF)  50 mcg Intravenous Q2H PRN Larissa Lara MD     • gabapentin  100 mg Oral HS LAURA Benavides     • lamoTRIgine  50 mg Oral HS LAURA Benavides     • lidocaine  1 patch Topical Daily LAURA Benavides     • milrinone (Primacor) infusion  0.13 mcg/kg/min Intravenous Continuous Ronaldo Guadalupe MD 0.13 mcg/kg/min (08/31/23 1035)   • norepinephrine  1-30 mcg/min Intravenous Titrated Toshia Herrera MD Stopped (08/30/23 2231)   • omeprazole (PRILOSEC) suspension 2 mg/mL  20 mg Per G Tube Daily LAURA Benavides     • oxyCODONE  5 mg Oral Q8H Ronaldo Guadalupe MD     • vasopressin  0.04 Units/min Intravenous Continuous Nasima Elias PA-C 0.04 Units/min (08/31/23 0046)     Continuous Infusions:milrinone (Primacor) infusion, 0.13 mcg/kg/min, Last Rate: 0.13 mcg/kg/min (08/31/23 1035)  norepinephrine, 1-30 mcg/min, Last Rate: Stopped (08/30/23 2231)  vasopressin, 0.04 Units/min, Last Rate: 0.04 Units/min (08/31/23 0046)      PRN Meds:.•  fentanyl citrate (PF)    Surgical procedures (if appropriate):

## 2023-08-31 NOTE — ASSESSMENT & PLAN NOTE
Wt Readings from Last 3 Encounters:   08/30/23 71 kg (156 lb 8.4 oz)   07/29/23 49.4 kg (108 lb 14.4 oz)   06/30/23 55.3 kg (122 lb)   8/27 CXR showed pulmonary congestion  - Positive fluid balance since admission might be affected by undocumented urine   - On PE euvolemic  - Lasix for 1 dose given  8/29 S/p cardiac arrest with ROSC   Repeat echo EF 15-20 %   Patient examines severely volume overloaded, however due to low BP at this time we are holding diuresis. Beta blocker on hold due to hypotension. Discontinue Albumin 25% 25g q6h.

## 2023-08-31 NOTE — ASSESSMENT & PLAN NOTE
· The patient had a slight transaminitis during admission but over the past few days has steadily risen and then doubled on 8/26 with his AST 2800, ALT 1114  · Severe elevation of INR, out of proportion to liver injury  · Multifactorial - She was found to have a slightly elevated tylenol level (that continues to normalize) which may be a contributing factor however, not thought to be the only issue (ischemic vs viral hepatitis)  · She has HF, which may contribute to hepatotoxicity  · Additionally she was on a few drugs (Plaquinel, wellbutrin and lamictal) which can all be associated with liver failure  · Plaquinel and Wellbutrin held  · Doppler studies were normal, no thrombus  · Hepatitis panel on 8.24 was positive for hep C antibodies otherwise negative  · EBV ab/ag positive  · HSV, and VZV panels pending  · Started on acyclovir until HSV can be ruled out  · GI is following- appreciate their assistance with her care  · S/p NAC and fomepizole, dc on  8/28 as per Medical Toxicology  · Supportive care  · Vitamin K supplementation  · She would not be a candidate for a liver transplant  Recent Labs     08/29/23  1606 08/30/23  0542 08/31/23  0421   * 237* 91*   * 465* 194*   ALKPHOS 109* 103 50   TBILI 2.40* 2.63* 3.60*   BILIDIR  --  1.23*  --    ·   · LFTs now improving. Likely in the setting of shock liver vs acetaminophen toxicity.

## 2023-08-31 NOTE — ASSESSMENT & PLAN NOTE
Recent Labs     08/30/23  0542 08/31/23  0421 08/31/23  0531   HGB 10.9* 6.4* 6.5*     Hgb today noted to be 6.4  1 unit of PRBCs ordered. No signs or symptoms of GI bleed, however there is new bruising over her knees bilaterally. Will check hemolysis and DIC labs. Consider CT C/A/P to assess for internal bleeding.

## 2023-09-01 NOTE — ASSESSMENT & PLAN NOTE
· Patient was brought to the ED after being in altered mental status, requiring more oxygen. · History of multiple episodes of vomiting at night prior to admission  · Initially admitted with aspiration pneumonia, treated with a course of antibiotics and improved  · Remained on oxygen therapy, but developed a change in MS and required transfer back to the ICU  · She was noted to have an increase WOB and was placed on BIPAP  · This current episode does not appear to be related to an acute lung issue but more likely related to acute hepatic failure  · Patient is status postcardiac arrest, was intubated   · CT H - no change from 3 day ago       Plan:  · Vent : 16/400/6/40%  · goal is to maintain her oxygen saturation > 90%  · Not appropriate for extubation at this time.

## 2023-09-01 NOTE — WOUND OSTOMY CARE
Progress Note - Wound   [place] 70 y.o. female MRN: [id number]  Unit/Bed#: ICU 04 Encounter: [id number]        Assessment:   Patient seen today for wound care follow up assessment. Patient is in ICU, intubated and sedated, on critical care low air loss mattress, being turned and repositioned with green foam wedges and prevalon boots in place with preventative Allevyn foam dressings placed on intact heels. Patient is dependent for all care. Since team has seen patient on Monday, patient experienced pulseless cardiac arrest and needed to be resuscitated. Patient is receiving enteral nutrition. Since cardiac patient presents with worsening and new wounds as listed below. 1. HA DTI sacral/buttocks- large area of 95% intact, purple nonblanchable erythema, with small 5% area of partial thickness skin loss with pink tissue, scant serosanguineous drainage present. Periwound skin is fragile. 2. HA DTI right medial back- purple, intact, nonblanchable erythema present, no drainage. Periwound skin is intact. 3. Right leg wound- partial thickness skin loss with pink tissue, moderate amount of serosanguineous drainage due to edema present. 4. Right arm wound- wound of unknown etiology, large purple/pink partial thickness skin loss, large amount of serosanguineous drainage due to weeping edema from arm. 5. Skin tear left arm- partial thickness skin loss with pink tissue, small amount of serosanguineous drainage. B/L feel have significant discoloration, likely vascular in nature due to cardiac arrest and use of pressors, large fluid filled blisters noted to feet, orders placed should they erupt and start draining. Primary team updated of findings and concerns due to significant decline in skin which is likely due to critical illness. No induration, fluctuance, odor, warmth/temperature differences, redness, or purulence noted to the above noted wounds and skin areas assessed.  New dressings applied per orders listed below. Patient tolerated well- no s/s of non-verbal pain or discomfort observed during the encounter. Bedside nurse aware of plan of care. See flow sheets for more detailed assessment findings. Wound care will continue to follow. Wound Care Plan:   1-Apply Allevyn Life foam dressing to right elbow and bilateral heels for prevention. Change dressing every 3 days and as needed. 2-Elevate heels off of bed/chair surface to offload pressure. 3-Offloading air cushion in chair if/when out of bed. 4-Moisturize skin daily with skin nourishing lotion. 5-Turn/reposition every 2 hours while in bed using positioning wedges; and weight shift frequently while in chair for pressure re-distribution on skin. 6-Low air-loss mattress. 7-Sacrum and right/mid back--cleanse with soap and water, pat dry. Apply Allevyn Life foam dressings. Germaine Poot for treatment. Change dressings every 3 days and as needed. 8-Cleanse B/L arms and right leg with normal saline, apply maxorb ag to wound bed, cover with ABD, secure with yomaira and tape. Change every other day and PRN soilage/displacement. (May also apply this treatment to B/L feet if blisters erupt).     Wound 08/25/23 Skin tear Pretibial Right (Active)   Wound Image   08/28/23 1517   Wound Description Fragile 09/01/23 1200   Dilia-wound Assessment Fragile;NIURKA 09/01/23 1200   Wound Length (cm) 1.3 cm 08/28/23 1517   Wound Width (cm) 3 cm 08/28/23 1517   Wound Depth (cm) 0.1 cm 08/28/23 1517   Wound Surface Area (cm^2) 3.9 cm^2 08/28/23 1517   Wound Volume (cm^3) 0.39 cm^3 08/28/23 1517   Calculated Wound Volume (cm^3) 0.39 cm^3 08/28/23 1517   Drainage Amount Scant 09/01/23 1200   Drainage Description Serosanguineous 09/01/23 1200   Non-staged Wound Description Partial thickness 08/28/23 1517   Treatments Cleansed;Site care 08/31/23 2000   Dressing Open to air 09/01/23 1200   Dressing Changed Changed 08/28/23 1517   Patient Tolerance Tolerated well 08/28/23 1517   Dressing Status Clean;Dry; Intact 09/01/23 0800       Wound 08/26/23 Skin Tear Arm Distal;Left;Posterior; Lower (Active)   Wound Image   09/01/23 1030   Wound Description Epithelialization;Fragile; Beefy red 09/01/23 1030   Dilia-wound Assessment Fragile 09/01/23 1030   Wound Length (cm) 3 cm 09/01/23 1030   Wound Width (cm) 2 cm 09/01/23 1030   Wound Depth (cm) 0.1 cm 09/01/23 1030   Wound Surface Area (cm^2) 6 cm^2 09/01/23 1030   Wound Volume (cm^3) 0.6 cm^3 09/01/23 1030   Calculated Wound Volume (cm^3) 0.6 cm^3 09/01/23 1030   Change in Wound Size % -328.57 09/01/23 1030   Tunneling 0 cm 09/01/23 1030   Tunneling in depth located at 0 09/01/23 1030   Undermining 0 09/01/23 1030   Undermining is depth extending from 0 09/01/23 1030   Wound Site Closure NIURKA 09/01/23 1030   Drainage Amount Small 09/01/23 1200   Drainage Description Serosanguineous 09/01/23 1200   Non-staged Wound Description Partial thickness 09/01/23 1030   Treatments Cleansed 09/01/23 1030   Dressing Calcium Alginate;ABD;Gauze 09/01/23 1200   Wound packed? No 09/01/23 1030   Packing- # removed 0 09/01/23 1030   Packing- # inserted 0 09/01/23 1030   Dressing Changed Changed 09/01/23 1200   Patient Tolerance Tolerated well 09/01/23 1030   Dressing Status Clean;Dry; Intact 09/01/23 1200       Wound 08/26/23 Back Medial;Right (Active)   Wound Image   09/01/23 1038   Wound Description Epithelialization;Fragile; Non-blanchable erythema 09/01/23 1442   Pressure Injury Stage DTPI 09/01/23 1442   Dilia-wound Assessment Clean;Dry; Intact 09/01/23 1442   Wound Length (cm) 6 cm 09/01/23 1442   Wound Width (cm) 1 cm 09/01/23 1442   Wound Depth (cm) 0 cm 09/01/23 1442   Wound Surface Area (cm^2) 6 cm^2 09/01/23 1442   Wound Volume (cm^3) 0 cm^3 09/01/23 1442   Calculated Wound Volume (cm^3) 0 cm^3 09/01/23 1442   Change in Wound Size % 100 09/01/23 1442   Tunneling 0 cm 09/01/23 1442   Tunneling in depth located at 0 09/01/23 1442   Undermining 0 09/01/23 1442   Undermining is depth extending from 0 09/01/23 1442   Drainage Amount None 09/01/23 1442   Non-staged Wound Description Not applicable 08/16/64 1803   Treatments Cleansed 09/01/23 1442   Dressing Foam, Silicon (eg. Allevyn, etc) 09/01/23 1442   Wound packed? No 09/01/23 1442   Packing- # removed 0 09/01/23 1442   Packing- # inserted 0 09/01/23 1442   Dressing Changed New 09/01/23 1442   Patient Tolerance Tolerated well 09/01/23 1442   Dressing Status Dry;Clean; Intact 09/01/23 1442       Wound 08/29/23 Skin Tear Arm Anterior;Right; Lower (Active)   Wound Image   09/01/23 1029   Wound Description Necrotic;Pink;Edema 09/01/23 1031   Dilia-wound Assessment Fragile; Purple 09/01/23 1031   Wound Length (cm) 16 cm 09/01/23 1031   Wound Width (cm) 14 cm 09/01/23 1031   Wound Depth (cm) 0.1 cm 09/01/23 1031   Wound Surface Area (cm^2) 224 cm^2 09/01/23 1031   Wound Volume (cm^3) 22.4 cm^3 09/01/23 1031   Calculated Wound Volume (cm^3) 22.4 cm^3 09/01/23 1031   Tunneling 0 cm 09/01/23 1031   Tunneling in depth located at 0 09/01/23 1031   Undermining 0 09/01/23 1031   Undermining is depth extending from 0 09/01/23 1031   Wound Site Closure NIURKA 09/01/23 1031   Drainage Amount Large 09/01/23 1031   Drainage Description Serosanguineous 09/01/23 1031   Non-staged Wound Description Partial thickness 09/01/23 1031   Treatments Cleansed 09/01/23 1031   Dressing Open to air 09/01/23 1200   Wound packed? No 09/01/23 1031   Packing- # removed 0 09/01/23 1031   Packing- # inserted 0 09/01/23 1031   Dressing Changed New 09/01/23 1031   Patient Tolerance Tolerated well 09/01/23 1031   Dressing Status Clean;Dry; Intact 09/01/23 1031       Wound 09/01/23 Pressure Injury Buttocks (Active)   Wound Image   09/01/23 1443   Wound Description Epithelialization;Pink;Fragile; Non-blanchable erythema 09/01/23 1443   Pressure Injury Stage DTPI 09/01/23 1443   Dilia-wound Assessment Fragile 09/01/23 1443   Wound Length (cm) 13 cm 09/01/23 1443   Wound Width (cm) 14 cm 09/01/23 1443   Wound Depth (cm) 0.1 cm 09/01/23 1443   Wound Surface Area (cm^2) 182 cm^2 09/01/23 1443   Wound Volume (cm^3) 18.2 cm^3 09/01/23 1443   Calculated Wound Volume (cm^3) 18.2 cm^3 09/01/23 1443   Tunneling 0 cm 09/01/23 1443   Tunneling in depth located at 0 09/01/23 1443   Undermining 0 09/01/23 1443   Undermining is depth extending from 0 09/01/23 1443   Wound Site Closure NIURKA 09/01/23 1443   Drainage Amount Scant 09/01/23 1443   Drainage Description Serosanguineous 09/01/23 1443   Non-staged Wound Description Partial thickness 09/01/23 1443   Treatments Cleansed 09/01/23 1443   Dressing Foam, Silicon (eg. Allevyn, etc) 09/01/23 1443   Wound packed? No 09/01/23 1443   Packing- # removed 0 09/01/23 1443   Packing- # inserted 0 09/01/23 1443   Dressing Changed New 09/01/23 1443   Patient Tolerance Tolerated well 09/01/23 1443   Dressing Status Clean;Dry; Intact 09/01/23 1443       Yelitza BAPTISTEN, RN, [employer]

## 2023-09-01 NOTE — ASSESSMENT & PLAN NOTE
Recent Labs     08/31/23  0027 08/31/23  0421 09/01/23  0522   CREATININE 1.58* 1.52* 1.33*   EGFR 33 34 40     Estimated Creatinine Clearance: 37.5 mL/min (A) (by C-G formula based on SCr of 1.33 mg/dL (H)). Suspect this is likely in the setting of poor perfusion and cardiorenal syndrome. Plan;   Consider increasing milrinone dose. Unable to give Lasix at this time due to low BP in the setting of cardiogenic shock. Avoid nephrotoxic agents. Continue monitoring renal indices.

## 2023-09-01 NOTE — PROGRESS NOTES
[facility]  Progress Note  Name: Adis Odonnell  MRN: [id number]  Unit/Bed#: ICU 04 I Date of Admission: 8/10/2023   Date of Service: 9/1/2023 I Hospital Day: 22    Assessment/Plan   * Acute respiratory failure with hypoxia [facility])  Assessment & Plan  · Patient was brought to the ED after being in altered mental status, requiring more oxygen. · History of multiple episodes of vomiting at night prior to admission  · Initially admitted with aspiration pneumonia, treated with a course of antibiotics and improved  · Remained on oxygen therapy, but developed a change in MS and required transfer back to the ICU  · She was noted to have an increase WOB and was placed on BIPAP  · This current episode does not appear to be related to an acute lung issue but more likely related to acute hepatic failure  · Patient is status postcardiac arrest, was intubated   · CT H - no change from 3 day ago       Plan:  · Vent : 16/400/6/40%  · goal is to maintain her oxygen saturation > 90%  · Not appropriate for extubation at this time.        Acute liver failure  Assessment & Plan  · The patient had a slight transaminitis during admission but over the past few days has steadily risen and then doubled on 8/26 with his AST 2800, ALT 1114  · Severe elevation of INR, out of proportion to liver injury  · Multifactorial - She was found to have a slightly elevated tylenol level (that continues to normalize) which may be a contributing factor however, not thought to be the only issue (ischemic vs viral hepatitis)  · She has HF, which may contribute to hepatotoxicity  · Additionally she was on a few drugs (Plaquinel, wellbutrin and lamictal) which can all be associated with liver failure  · Plaquinel and Wellbutrin held  · Doppler studies were normal, no thrombus  · Hepatitis panel on 8.24 was positive for hep C antibodies otherwise negative  · EBV ab/ag positive  · HSV, and VZV panels pending  · Started on acyclovir until HSV can be ruled out  · GI is following- appreciate their assistance with her care  · S/p NAC and fomepizole, dc on  8/28 as per Medical Toxicology  · Supportive care  · Vitamin K supplementation  · She would not be a candidate for a liver transplant  Recent Labs     08/30/23  0542 08/31/23  0421 09/01/23  0522   * 91* 56*   * 194* 128*   ALKPHOS 103 50 56   TBILI 2.63* 3.60* 3.77*   BILIDIR 1.23*  --   --      · LFTs now improving. Likely in the setting of shock liver vs acetaminophen toxicity. HFrEF (heart failure with reduced ejection fraction) ([facility])  Assessment & Plan  Wt Readings from Last 3 Encounters:   09/01/23 66.8 kg (147 lb 4.3 oz)   07/29/23 49.4 kg (108 lb 14.4 oz)   06/30/23 55.3 kg (122 lb)   8/27 CXR showed pulmonary congestion  - Positive fluid balance since admission might be affected by undocumented urine   - On PE euvolemic  - Lasix for 1 dose given  8/29 S/p cardiac arrest with ROSC   Repeat echo EF 15-20 %   Patient examines severely volume overloaded, however due to low BP at this time we are holding diuresis. Beta blocker on hold due to hypotension. Discontinue Albumin 25% 25g q6h  8/30 Started on milrinone, nor epi, vasopressin    LAILA (acute kidney injury) [facility])  Assessment & Plan  Recent Labs     08/31/23  0027 08/31/23  0421 09/01/23  0522   CREATININE 1.58* 1.52* 1.33*   EGFR 33 34 40     Estimated Creatinine Clearance: 37.5 mL/min (A) (by C-G formula based on SCr of 1.33 mg/dL (H)). Suspect this is likely in the setting of poor perfusion and cardiorenal syndrome. Plan;   Consider increasing milrinone dose. Unable to give Lasix at this time due to low BP in the setting of cardiogenic shock. Avoid nephrotoxic agents. Continue monitoring renal indices.      Rheumatoid arthritis of multiple sites with negative rheumatoid factor ([address])  Assessment & Plan  · Patient has a history of seronegative multiple joint arthropathy  · Currently on hydroxychloroquine 200 mg twice daily  · Patient follows up with rheumatologist    Plan:  · We are going to hold her plaquenil for now. Though she has been on it for a while, it can be associated with fulminant liver failure    Bipolar depression ([address])  Assessment & Plan  · no ativan  · On wellbutrin and duloxetine  · Wellbutrin was stopped secondary to hepatic toxicity risk  · cont gabapentin and lamictal. Lower dose started 2/2 sedation and uptitrate as able. · Lamictal is also associated with hepatic toxicity but given her seizure risk, we will continue this for now      Anemia  Assessment & Plan  Recent Labs     08/31/23  1451 08/31/23  1626 08/31/23  1934   HGB 6.6* 6.6* 8.2*     8/31 Hgb noted to be 6.4. S/p 1 unit of PRBCs   Today hemoglobin is pending  No signs or symptoms of GI bleed, however there is new bruising over her knees bilaterally. Will check hemolysis and DIC labs: LDH slightly elevated to 275, haptoglobin is pending   CT C/A/P no signs of internal bleeding             ICU Core Measures     Vented Patient  VAP Bundle  VAP Bundle: Not ordered will order     A: Assess, Prevent, and Manage Pain · Has pain been assessed? Yes  · Need for changes to pain regimen? No   B: Both Spontaneous Awakening Trials (SATs) and Spontaneous Breathing Trials (SBTs) · Plan to perform spontaneous awakening trial today? No secondary to cardiogenic shock, anemia, kenny  · Plan to perform spontaneous breathing trial today? No secondary to cardiogenic shock, anemia   · Obvious barriers to extubation? Yes   C: Choice of Sedation · RASS Goal: -3 Moderate Sedation or 0 Alert and Calm  · Need for changes to sedation or analgesia regimen? No   D: Delirium · CAM-ICU: Negative   E: Early Mobility  · Plan for early mobility? No   F: Family Engagement · Plan for family engagement today? Yes       Review of Invasive Devices:     [employer] Plan: Continue for accurate I/O monitoring for 48 hours  Central access plan: Patient has multiple central venous catheters. [place] Plan: Keep arterial line for hemodynamic monitoring    Prophylaxis:  VTE VTE covered by:    Boy Landin       Stress Ulcer  covered byomeprazole (PRILOSEC) suspension 2 mg/mL [[id number]]       Subjective   HPI/24hr events: No acute events overnight, patient is hemodynamically stable.   Unable to assess review of system as patient is being sedated  Objective                            Vitals I/O      Most Recent Min/Max in 24hrs   Temp 98.6 °F (37 °C) Temp  Min: 97.2 °F (36.2 °C)  Max: 99.3 °F (37.4 °C)   Pulse (!) 106 Pulse  Min: 98  Max: 110   Resp 21 Resp  Min: 0  Max: 21   /61 BP  Min: 97/46  Max: 113/61   O2 Sat 98 % SpO2  Min: 87 %  Max: 100 %      Intake/Output Summary (Last 24 hours) at 9/1/2023 0816  Last data filed at 9/1/2023 0601  Gross per 24 hour   Intake 3048.99 ml   Output 1645 ml   Net 1403.99 ml         Diet Enteral/Parenteral; Tube Feeding No Oral Diet; Jevity 1.2 Prabhjot; Continuous; 50; 100; Every 4 hours     Invasive Monitoring Physical exam   Arterial Line  [place] /56  Arterial Line BP  Min: 94/42  Max: 126/56   MAP 82 mmHg  Arterial Line MAP (mmHg)  Min: 62 mmHg  Max: 82 mmHg     PA Catheter   Most Recent  Min/Max in 24hrs    PAP   No data recorded   CVP   No data recorded   CI    No data recorded   SVR   No data recorded           Neuro Invasive Monitoring   Most Recent  Min/Max in 24hrs    GCS 6  Jeannette Coma Scale Score  Min: 3  Max: 6    ICP     No data recorded    CPP (cuff)    No data recorded    CPP(verito)    No data recorded   CVP   No data recorded     ECMO:   Most Recent Min/Max in 24hrs   Flow (LPM)   No data recorded   Speed (RPM)   No data recorded    Flow   No data recorded   FIO2        LVAD   Most Recent  Min/Max in 24hrs    Pump Flow (LPM)   No data recorded    Pump Speed (RPM)   No data recorded   Pump Power    No data recorded    Pulsatility Index   No data recorded    Physical Exam  Eyes:      Conjunctiva/sclera: Conjunctivae normal. Skin:     Findings: Wound present. HENT:      Head: Normocephalic and atraumatic. Mouth/Throat:      Mouth: Mucous membranes are moist.   Cardiovascular:      Rate and Rhythm: Normal rate and regular rhythm. Pulses: Normal pulses. Abdominal:      Palpations: Abdomen is soft. Constitutional:       General: She is not in acute distress. Appearance: She is ill-appearing. Pulmonary:      Comments: Mechanical ventilation 16/400/6/40    Neurological:      Comments: Sedated   Genitourinary/Anorectal:  [place]       Diagnostic Studies      EKG:   No new EKG obtained I  maging: I have personally reviewed pertinent reports.        Medications:  Scheduled PRN   DULoxetine, 60 mg, BID  fentanyl citrate (PF), 50 mcg, Once  gabapentin, 100 mg, HS  glycerin-hypromellose-, 1 drop, TID  lamoTRIgine, 50 mg, HS  lidocaine, 1 patch, Daily  omeprazole (PRILOSEC) suspension 2 mg/mL, 20 mg, Daily  oxyCODONE, 5 mg, Q8H  potassium chloride, 20 mEq, Q2H      fentanyl citrate (PF), 50 mcg, Q2H PRN       Continuous    milrinone (Primacor) infusion, 0.13 mcg/kg/min, Last Rate: 0.13 mcg/kg/min (08/31/23 1258)  norepinephrine, 1-30 mcg/min, Last Rate: Stopped (08/30/23 2231)  vasopressin, 0.04 Units/min, Last Rate: 0.04 Units/min (09/01/23 0706)         Labs:    CBC    Recent Labs     08/31/23  1626 08/31/23  1934 09/01/23  0522   WBC 12.23*  --  9.88   HGB 6.6* 8.2* 8.4*   HCT 20.9*  --  27.0*   PLT 77*  --  57*     BMP    Recent Labs     08/31/23  0421 09/01/23  0522   SODIUM 146 148*   K 3.8 2.6*   * 111*   CO2 22 28   AGAP 13 9   BUN 43* 48*   CREATININE 1.52* 1.33*   CALCIUM 9.8 9.9       Coags    Recent Labs     08/31/23  0756   INR 3.57*   PTT 40*        Additional Electrolytes  Recent Labs     08/31/23 0421 09/01/23  0522   MG 2.3 2.0   PHOS 4.4* 3.4          Blood Gas    Recent Labs     09/01/23  0557   PHART 7.499*   KQP9FRT 34.9*   PO2ART 58.9*   QER5UPP 26.5   BEART 3.3   SOURCE Line, Arterial Recent Labs     08/31/23 0421 08/31/23  1452 09/01/23  0557   PHVEN 7.417*  --   --    GVN2NPT 35.9*  --   --    PO2VEN 46.2*  --   --    [id number] 22.6*  --   --    BEVEN -1.7  --   --    SOURCE  --    < > Line, Arterial    < > = values in this interval not displayed. LFTs  Recent Labs     08/31/23 0421 09/01/23  0522   * 128*   AST 91* 56*   ALKPHOS 50 56   ALB 3.1* 3.1*   TBILI 3.60* 3.77*       Infectious  No recent results  Glucose  Recent Labs     08/31/23  0027 08/31/23 0421 09/01/23  0522   GLUC 119 148* 96               Critical Care Time Delivered: Upon my evaluation, this patient had a high probability of imminent or life-threatening deterioration due to Cardiogenic shock, anemia, LAILA, acute liver failure, which required my direct attention, intervention, and personal management. I have personally provided 25 minutes of critical care time, exclusive of procedures, teaching, family meetings, and any prior time recorded by providers other than myself.      Cheryl Trevino MD

## 2023-09-01 NOTE — PROGRESS NOTES
Progress Note -  [facility]  Meg Boyle 70 y.o. female MRN: [id number]  Unit/Bed#: ICU 04 Encounter: [id number]      ASSESSMENT AND PLAN:      76-year-old female with past medical history of undifferentiated connective tissue disease, rheumatoid arthritis, bipolar depression, recent CVA 5 months ago status post PEG tube who was admitted for acute hypoxic respiratory failure.  She is now status postcardiac arrest and on ventilator.  GI is consulted for elevated liver enzymes.     1. Acute liver injury  2. Elevated liver enzymes  3. Hepatitis C antibody positive  Unclear etiology though given his severely elevated Tylenol level suspect Tylenol toxicity.  Other etiologies include ischemic hepatitis versus viral causes.  Acute hepatitis panel positive for hep C antibody.  Overall liver enzymes are improving with NAC/omeprazole INR also improved with administration of vitamin K. Francesco Blare upper quadrant ultrasound shows small volume ascites but otherwise unremarkable.  CT scan shows heterogeneous liver.  HSV, VZV negative.  EBV shows old infection.  Hep C RNA negative indicating cleared infection. LFTs resolving. • Monitor LFTs, INR daily.     4. Macrocytic anemia  5. Possible DIC  Hemoglobin acutely dropped to 6.4, currently 8.4 status post 2 units PRBC, baseline 10-11.  Macrocytic.  Vitamin B12 and folate normal.  TSH normal.   Also noted to have 4.3 x 2.6 cm fluid collection in left lateral hip which may be hematoma on CT however repeat imaging without contrast without any evidence of worsening RP hematoma.  No signs of overt GI bleeding.  Iron panel consistent with anemia of chronic disease. LDH elevated, haptoglobin normal.  Low fibrinogen, plasminogen decreased, Antithrombin III activity decreased. Suggests DIC. Hemolysis smear negative for schistocytes. • Monitor for signs of overt GI bleeding.   • Management of DIC per primary team.  • Monitor hemoglobin, transfuse for less than 7.     5. CVA with dysphagia  6. Status post PEG tube  Reportedly had leakage around PEG tube site for the past few days. Site was inspected. Appeared somewhat loose. Bumper tightened to 2 cm. She will likely have leakage due to poor nutritional status, inability to heal adequately. Unfortunately given that PEG tube was placed on 8/17/2023 cannot remove and replace PEG tube as tract is not mature due to high risk for peritonitis. • Okay to continue tube feeds. Can also consider lowering rate to prevent leakage. • Local PEG tube care. • Flush with medications and feeds.     Gastroenterology to sign off at this time. Please contact with any questions. Rest of care per primary team.    ______________________________________________________________________    Subjective: Seen and examined. Per nurse has not had issues with leakage this morning when initiating tube feeds. Issues mainly occur at night. Patient is intubated and sedated. PEG tube inspected.     REVIEW OF SYSTEMS:    Review of Systems   Unable to perform ROS: Intubated          Historical Information   Past Medical History:   Diagnosis Date   • Anxiety    • Asthma    • Bipolar depression ([address])    • Chronic narcotic dependence ([address])    • Chronic pain    • Depression    • Enterovirus heart infection    • SHAR (obstructive sleep apnea)    • Osteoarthritis    • Peripheral neuropathy    • Plantar fasciitis of right foot    • Senile osteoporosis    • Seronegative arthropathy of multiple sites ([address])    • Undifferentiated connective tissue disease ([address])      Past Surgical History:   Procedure Laterality Date   • BREAST IMPLANT     • FIXATION KYPHOPLASTY LUMBAR SPINE     • HYSTERECTOMY     • INCONTINENCE SURGERY N/A    • IR STROKE ALERT  2/12/2023   • NASAL SEPTOPLASTY W/ TURBINOPLASTY N/A    • RECTAL PROLAPSE REPAIR N/A    • REPAIR RECTOCELE     • RHINOPLASTY N/A      Social History   Social History     Substance and Sexual Activity   Alcohol Use Not Currently    Comment: quit     Social History     Substance and Sexual Activity   Drug Use Not Currently   • Types: Marijuana    Comment: has medical card     Social History     Tobacco Use   Smoking Status Former   Smokeless Tobacco Never     Family History   Problem Relation Age of Onset   • No Known Problems Mother    • No Known Problems Father    • Arthritis Family    • Diabetes Family    • Stroke Family        Meds/Allergies     Medications Prior to Admission   Medication   • acetaminophen (TYLENOL) 325 mg tablet   • albuterol (PROVENTIL HFA,VENTOLIN HFA) 90 mcg/act inhaler   • apixaban (ELIQUIS) 5 mg   • buPROPion (WELLBUTRIN) 100 mg tablet   • cinacalcet (SENSIPAR) 30 mg tablet   • Diclofenac Sodium (VOLTAREN) 1 %   • docusate sodium (COLACE) 100 mg capsule   • DULoxetine (CYMBALTA) 60 mg delayed release capsule   • gabapentin (NEURONTIN) 300 mg capsule   • hydroxychloroquine (PLAQUENIL) 200 mg tablet   • lamoTRIgine (LaMICtal) 150 MG tablet   • lidocaine (LIDODERM) 5 %   • magnesium oxide (MAG-OX) 400 mg tablet   • melatonin 3 mg   • metoprolol succinate (TOPROL-XL) 25 mg 24 hr tablet   • morphine (MSIR) 15 mg tablet   • pantoprazole (PROTONIX) 40 mg tablet   • senna (SENOKOT) 8.6 mg     Current Facility-Administered Medications   Medication Dose Route Frequency   • DULoxetine (CYMBALTA) delayed release capsule 60 mg  60 mg Per G Tube BID   • fentanyl citrate (PF) 100 MCG/2ML 50 mcg  50 mcg Intravenous Q2H PRN   • furosemide (LASIX) injection 40 mg  40 mg Intravenous Once   • gabapentin (NEURONTIN) oral solution 100 mg  100 mg Oral HS   • glycerin-hypromellose- (ARTIFICIAL TEARS) ophthalmic solution 1 drop  1 drop Both Eyes TID   • lamoTRIgine (LaMICtal) tablet 50 mg  50 mg Oral HS   • lidocaine (LIDODERM) 5 % patch 1 patch  1 patch Topical Daily   • milrinone (PRIMACOR) 20 mg in 100 mL infusion (premix)  0.13 mcg/kg/min Intravenous Continuous   • NOREPINEPHRINE 4 MG  ML NSS (CMPD ORDER) infusion  1-30 mcg/min Intravenous Titrated   • omeprazole (PRILOSEC) suspension 2 mg/mL  20 mg Per G Tube Daily   • oxyCODONE (ROXICODONE) oral solution 5 mg  5 mg Oral Q8H   • potassium chloride 20 mEq IVPB (premix)  20 mEq Intravenous Q2H   • vasopressin (PITRESSIN) 20 Units in sodium chloride 0.9 % 100 mL infusion  0.04 Units/min Intravenous Continuous       Allergies   Allergen Reactions   • Ativan [Lorazepam] Other (See Comments)     Per pt daughter   • Cephalosporins Other (See Comments)     rash   • Iodine - Food Allergy Anaphylaxis     Anaphylaxis     • Methocarbamol Hives   • Sulfa Antibiotics Hives   • Bactrim [Sulfamethoxazole-Trimethoprim] Hives   • Erythromycin Hives   • Penicillins    • Shrimp Extract Allergy Skin Test - Food Allergy Other (See Comments)   • Trimethoprim Hives           Objective     Blood pressure 113/61, pulse 102, temperature 98.6 °F (37 °C), resp. rate 15, height 5' 4" (1.626 m), weight 66.8 kg (147 lb 4.3 oz), SpO2 99 %. Body mass index is 25.28 kg/m². Intake/Output Summary (Last 24 hours) at 9/1/2023 0943  Last data filed at 9/1/2023 0800  Gross per 24 hour   Intake 3178.34 ml   Output 1775 ml   Net 1403.34 ml         PHYSICAL EXAM:      Physical Exam  Vitals and nursing note reviewed. Constitutional:       General: She is not in acute distress. Appearance: Normal appearance. She is ill-appearing. Interventions: She is sedated and intubated. HENT:      Head: Normocephalic and atraumatic. Mouth/Throat:      Mouth: Mucous membranes are moist.   Eyes:      Extraocular Movements: Extraocular movements intact. Conjunctiva/sclera: Conjunctivae normal.   Cardiovascular:      Pulses: Normal pulses. Pulmonary:      Effort: Pulmonary effort is normal. She is intubated. Abdominal:      General: Abdomen is flat. Bowel sounds are normal. There is no distension. Palpations: Abdomen is soft. Tenderness: There is no abdominal tenderness.  There is no guarding. Skin:     General: Skin is warm and dry. Neurological:      General: No focal deficit present. Lab Results:   No results displayed because visit has over 200 results. Imaging Studies: I have personally reviewed pertinent imaging studies. [address] [facility] SANDEEP   Gastroenterology Fellow  [id number]  Available via [employer]  9/1/2023 9:43 AM

## 2023-09-01 NOTE — ASSESSMENT & PLAN NOTE
· The patient had a slight transaminitis during admission but over the past few days has steadily risen and then doubled on 8/26 with his AST 2800, ALT 1114  · Severe elevation of INR, out of proportion to liver injury  · Multifactorial - She was found to have a slightly elevated tylenol level (that continues to normalize) which may be a contributing factor however, not thought to be the only issue (ischemic vs viral hepatitis)  · She has HF, which may contribute to hepatotoxicity  · Additionally she was on a few drugs (Plaquinel, wellbutrin and lamictal) which can all be associated with liver failure  · Plaquinel and Wellbutrin held  · Doppler studies were normal, no thrombus  · Hepatitis panel on 8.24 was positive for hep C antibodies otherwise negative  · EBV ab/ag positive  · HSV, and VZV panels pending  · Started on acyclovir until HSV can be ruled out  · GI is following- appreciate their assistance with her care  · S/p NAC and fomepizole, dc on  8/28 as per Medical Toxicology  · Supportive care  · Vitamin K supplementation  · She would not be a candidate for a liver transplant  Recent Labs     08/30/23  0542 08/31/23  0421 09/01/23  0522   * 91* 56*   * 194* 128*   ALKPHOS 103 50 56   TBILI 2.63* 3.60* 3.77*   BILIDIR 1.23*  --   --      · LFTs now improving. Likely in the setting of shock liver vs acetaminophen toxicity.

## 2023-09-01 NOTE — PHYSICAL THERAPY NOTE
Physical Therapy Cancellation Note        Per chart review pt remains intubated at this time, not appropriate for weaning trial. Will dc orders given pt medical status. Will be available for re-consult when pt medically appropriate for therapeutic intervention.       Felice Koyanagi, PT

## 2023-09-01 NOTE — ASSESSMENT & PLAN NOTE
Wt Readings from Last 3 Encounters:   09/01/23 66.8 kg (147 lb 4.3 oz)   07/29/23 49.4 kg (108 lb 14.4 oz)   06/30/23 55.3 kg (122 lb)   8/27 CXR showed pulmonary congestion  - Positive fluid balance since admission might be affected by undocumented urine   - On PE euvolemic  - Lasix for 1 dose given  8/29 S/p cardiac arrest with ROSC   Repeat echo EF 15-20 %   Patient examines severely volume overloaded, however due to low BP at this time we are holding diuresis. Beta blocker on hold due to hypotension.    Discontinue Albumin 25% 25g q6h  8/30 Started on milrinone, nor epi, vasopressin

## 2023-09-01 NOTE — PLAN OF CARE
Problem: Potential for Falls  Goal: Patient will remain free of falls  Description: INTERVENTIONS:  - Educate patient/family on patient safety including physical limitations  - Instruct patient to call for assistance with activity   - Consult OT/PT to assist with strengthening/mobility   - Keep Call bell within reach  - Keep bed low and locked with side rails adjusted as appropriate  - Keep care items and personal belongings within reach  - Initiate and maintain comfort rounds  - Make Fall Risk Sign visible to staff  - Offer Toileting every 2 Hours, in advance of need  - Initiate/Maintain bed/chair alarm  - Obtain necessary fall risk management equipment: bed/chair alarm, call bell, gripper socks, walker, bedside commode  - Apply yellow socks and bracelet for high fall risk patients  - Consider moving patient to room near nurses station  Outcome: Progressing     Problem: MOBILITY - ADULT  Goal: Maintain or return to baseline ADL function  Description: INTERVENTIONS:  - Educate patient/family on patient safety including physical limitations  - Instruct patient to call for assistance with activity   - Consult OT/PT to assist with strengthening/mobility   - Keep Call bell within reach  - Keep bed low and locked with side rails adjusted as appropriate  - Keep care items and personal belongings within reach  - Initiate and maintain comfort rounds  - Make Fall Risk Sign visible to staff  - Offer Toileting every 2 Hours, in advance of need  - Initiate/Maintain bed/chair alarm  - Obtain necessary fall risk management equipment: bed/chair alarm, call bell, gripper socks, walker, bedside commode  - Apply yellow socks and bracelet for high fall risk patients  - Consider moving patient to room near nurses station  Outcome: Progressing  Goal: Maintains/Returns to pre admission functional level  Description: INTERVENTIONS:  - Perform BMAT or MOVE assessment daily.   - Set and communicate daily mobility goal to care team and patient. - Collaborate with rehabilitation services on mobility goals if consulted  - Reposition patient every 2 hours.   - Dangle patient 3 times a day  - Stand patient 3 times a day  - Ambulate patient 3 times a day  - Out of bed to chair 3 times a day   - Out of bed for meals  - Out of bed for toileting  - Record patient progress and toleration of activity level   Outcome: Progressing     Problem: PAIN - ADULT  Goal: Verbalizes/displays adequate comfort level or baseline comfort level  Description: Interventions:  - Encourage patient to monitor pain and request assistance  - Assess pain using appropriate pain scale  - Administer analgesics based on type and severity of pain and evaluate response  - Implement non-pharmacological measures as appropriate and evaluate response  - Consider cultural and social influences on pain and pain management  - Notify physician/advanced practitioner if interventions unsuccessful or patient reports new pain  Outcome: Progressing     Problem: INFECTION - ADULT  Goal: Absence or prevention of progression during hospitalization  Description: INTERVENTIONS:  - Assess and monitor for signs and symptoms of infection  - Monitor lab/diagnostic results  - Monitor all insertion sites, i.e. indwelling lines, tubes, and drains  - Monitor endotracheal if appropriate and nasal secretions for changes in amount and color  - [place] appropriate cooling/warming therapies per order  - Administer medications as ordered  - Instruct and encourage patient and family to use good hand hygiene technique  - Identify and instruct in appropriate isolation precautions (contact isolation for CRE) for identified infection/condition  Outcome: Progressing     Problem: SAFETY ADULT  Goal: Patient will remain free of falls  Description: INTERVENTIONS:  - Educate patient/family on patient safety including physical limitations  - Instruct patient to call for assistance with activity   - Consult OT/PT to assist with strengthening/mobility   - Keep Call bell within reach  - Keep bed low and locked with side rails adjusted as appropriate  - Keep care items and personal belongings within reach  - Initiate and maintain comfort rounds  - Make Fall Risk Sign visible to staff  - Offer Toileting every 2 Hours, in advance of need  - Initiate/Maintain bed/chair alarm  - Obtain necessary fall risk management equipment: bed/chair alarm, call bell, gripper socks, walker, bedside commode  - Apply yellow socks and bracelet for high fall risk patients  - Consider moving patient to room near nurses station  Outcome: Progressing  Goal: Maintain or return to baseline ADL function  Description: INTERVENTIONS:  - Educate patient/family on patient safety including physical limitations  - Instruct patient to call for assistance with activity   - Consult OT/PT to assist with strengthening/mobility   - Keep Call bell within reach  - Keep bed low and locked with side rails adjusted as appropriate  - Keep care items and personal belongings within reach  - Initiate and maintain comfort rounds  - Make Fall Risk Sign visible to staff  - Offer Toileting every 2 Hours, in advance of need  - Initiate/Maintain bed/chair alarm  - Obtain necessary fall risk management equipment: bed/chair alarm, call bell, gripper socks, walker, bedside commode  - Apply yellow socks and bracelet for high fall risk patients  - Consider moving patient to room near nurses station  Outcome: Progressing  Goal: Maintains/Returns to pre admission functional level  Description: INTERVENTIONS:  - Perform BMAT or MOVE assessment daily.   - Set and communicate daily mobility goal to care team and patient. - Collaborate with rehabilitation services on mobility goals if consulted  - Reposition patient every 2 hours.   - Dangle patient 3 times a day  - Stand patient 3 times a day  - Ambulate patient 3 times a day  - Out of bed to chair 3 times a day   - Out of bed for meals  - Out of bed for toileting  - Record patient progress and toleration of activity level   Outcome: Progressing     Problem: DISCHARGE PLANNING  Goal: Discharge to home or other facility with appropriate resources  Description: INTERVENTIONS:  - Identify barriers to discharge w/patient  - Arrange for needed discharge resources and transportation as appropriate  - Identify discharge learning needs   - Refer to Case Management Department for coordinating discharge planning if the patient needs post-hospital services based on physician/advanced practitioner order or complex needs related to functional status, cognitive ability, or social support system  Outcome: Progressing     Problem: Knowledge Deficit  Goal: Patient/family/caregiver demonstrates understanding of disease process, treatment plan, medications, and discharge instructions  Description: Complete learning assessment and assess knowledge base.   Interventions:  - Provide teaching at level of understanding  - Provide teaching via preferred learning methods  Outcome: Progressing     Problem: Prexisting or High Potential for Compromised Skin Integrity  Goal: Skin integrity is maintained or improved  Description: INTERVENTIONS:  - Identify patients at risk for skin breakdown  - Assess and monitor skin integrity  - Assess and monitor nutrition and hydration status  - Monitor labs   - Assess for incontinence   - Turn and reposition patient  - Assist with mobility/ambulation  - Relieve pressure over bony prominences  - Avoid friction and shearing  - Provide appropriate hygiene as needed including keeping skin clean and dry  - Evaluate need for skin moisturizer/barrier cream  - Collaborate with interdisciplinary team   - Patient/family teaching  - Consider wound care consult   Outcome: Progressing     Problem: Nutrition/Hydration-ADULT  Goal: Nutrient/Hydration intake appropriate for improving, restoring or maintaining nutritional needs  Description: Monitor and assess patient's nutrition/hydration status for malnutrition. Collaborate with interdisciplinary team and initiate plan and interventions as ordered. Monitor patient's weight and dietary intake as ordered or per policy. Utilize nutrition screening tool and intervene as necessary. Determine patient's food preferences and provide high-protein, high-caloric foods as appropriate.      INTERVENTIONS:  - Monitor oral intake, urinary output, labs, and treatment plans  - Assess nutrition and hydration status and recommend course of action  - Evaluate amount of meals eaten  - Do not leave patient alone while eating due to aspiration risk  - Assist patient with eating if necessary   - Allow adequate time for meals  - Recommend/ encourage appropriate diets, oral nutritional supplements, and vitamin/mineral supplements  - Order, calculate, and assess calorie counts as needed  - Assess need for intravenous fluids  - Provide specific nutrition/hydration education as appropriate  - Include patient in decisions related to nutrition  Outcome: Progressing     Problem: RESPIRATORY - ADULT  Goal: Achieves optimal ventilation and oxygenation  Description: INTERVENTIONS:  - Assess for changes in respiratory status  - Assess for changes in mentation and behavior  - Position to facilitate oxygenation and minimize respiratory effort  - Oxygen administered by appropriate delivery if ordered   - Encourage broncho-pulmonary hygiene including cough, deep breathe, Incentive Spirometry, flutter valve  - Assess the need for suctioning and aspirate as needed  - Assess and instruct to report SOB or any respiratory difficulty  - Respiratory Therapy support as indicated  Outcome: Progressing     Problem: GENITOURINARY - ADULT  Goal: Maintains or returns to baseline urinary function  Description: INTERVENTIONS:  - Assess urinary function  - Encourage oral fluids to ensure adequate hydration if ordered  - Administer IV fluids as ordered to ensure adequate hydration  - Administer ordered medications as needed  - Offer frequent toileting  - Follow urinary retention protocol if ordered  Outcome: Progressing  Goal: Urinary catheter remains patent  Description: INTERVENTIONS:  - Assess patency of urinary catheter  - Follow guidelines for intermittent irrigation of non-functioning urinary catheter  Outcome: Progressing     Problem: SAFETY,RESTRAINT: NV/NON-SELF DESTRUCTIVE BEHAVIOR  Goal: Remains free of harm/injury (restraint for non violent/non self-detsructive behavior)  Description: INTERVENTIONS:  - Instruct patient/family regarding restraint use   - Assess and monitor physiologic and psychological status   - Provide interventions and comfort measures to meet assessed patient needs   - Identify and implement measures to help patient regain control  - Assess readiness for release of restraint   Outcome: Progressing  Goal: Returns to optimal restraint-free functioning  Description: INTERVENTIONS:  - Assess the patient's behavior and symptoms that indicate continued need for restraint  - Identify and implement measures to help patient regain control  - Assess readiness for release of restraint   Outcome: Progressing     Problem: NEUROSENSORY - ADULT  Goal: Achieves stable or improved neurological status  Description: INTERVENTIONS  - Monitor and report changes in neurological status  - Monitor vital signs such as temperature, blood pressure, glucose, and any other labs ordered   - Initiate measures to prevent increased intracranial pressure  - Monitor for seizure activity and implement precautions if appropriate      Outcome: Progressing  Goal: Achieves maximal functionality and self care  Description: INTERVENTIONS  - Monitor swallowing and airway patency with patient fatigue and changes in neurological status  - Encourage and assist patient to increase activity and self care.    - Encourage visually impaired, hearing impaired and aphasic patients to use assistive/communication devices  Outcome: Progressing     Problem: GASTROINTESTINAL - ADULT  Goal: Maintains adequate nutritional intake  Description: INTERVENTIONS:  - Monitor percentage of each meal consumed  - Identify factors contributing to decreased intake, treat as appropriate  - Assist with meals as needed  - Monitor I&O, weight, and lab values if indicated  - Obtain nutrition services referral as needed  Outcome: Progressing  Goal: Establish and maintain optimal ostomy function  Description: INTERVENTIONS:  - Assess bowel function  - Encourage oral fluids to ensure adequate hydration  - Administer IV fluids if ordered to ensure adequate hydration   - Administer ordered medications as needed  - Encourage mobilization and activity  - Nutrition services referral to assist patient with appropriate food choices  - Assess stoma site  - Consider wound care consult   Outcome: Progressing     Problem: METABOLIC, FLUID AND ELECTROLYTES - ADULT  Goal: Electrolytes maintained within normal limits  Description: INTERVENTIONS:  - Monitor labs and assess patient for signs and symptoms of electrolyte imbalances  - Administer electrolyte replacement as ordered  - Monitor response to electrolyte replacements, including repeat lab results as appropriate  - Instruct patient on fluid and nutrition as appropriate  Outcome: Progressing  Goal: Fluid balance maintained  Description: INTERVENTIONS:  - Monitor labs   - Monitor I/O and WT  - Instruct patient on fluid and nutrition as appropriate  - Assess for signs & symptoms of volume excess or deficit  Outcome: Progressing  Goal: Glucose maintained within target range  Description: INTERVENTIONS:  - Monitor Blood Glucose as ordered  - Assess for signs and symptoms of hyperglycemia and hypoglycemia  - Administer ordered medications to maintain glucose within target range  - Assess nutritional intake and initiate nutrition service referral as needed  Outcome: Progressing     Problem: SKIN/TISSUE INTEGRITY - ADULT  Goal: Skin Integrity remains intact(Skin Breakdown Prevention)  Description: Assess:  -Perform Paul assessment every 12hours  -Clean and moisturize skin every 2  -Inspect skin when repositioning, toileting, and assisting with ADLS  -Assess under medical devices such as  every   -Assess extremities for adequate circulation and sensation     Bed Management:  -Have minimal linens on bed & keep smooth, unwrinkled  -Change linens as needed when moist or perspiring  -Avoid sitting or lying in one position for more than  hours while in bed  -Keep HOB at degrees     Toileting:  -Offer bedside commode  -Assess for incontinence every   -Use incontinent care products after each incontinent episode such as     Activity:  -Mobilize patient  times a day  -Encourage activity and walks on unit  -Encourage or provide ROM exercises   -Turn and reposition patient every  Hours  -Use appropriate equipment to lift or move patient in bed  -Instruct/ Assist with weight shifting every  when out of bed in chair  -Consider limitation of chair time  hour intervals    Skin Care:  -Avoid use of baby powder, tape, friction and shearing, hot water or constrictive clothing  -Relieve pressure over bony prominences using   -Do not massage red bony areas    Next Steps:  -Teach patient strategies to minimize risks such as    -Consider consults to  interdisciplinary teams suc  Outcome: Progressing  Goal: Incision(s), wounds(s) or drain site(s) healing without S/S of infection  Description: INTERVENTIONS  - Assess and document dressing, incision, wound bed, drain sites and surrounding tissue  - Provide patient and family education  - Perform skin care/dressing changes every 3days  Outcome: Progressing     Problem: HEMATOLOGIC - ADULT  Goal: Maintains hematologic stability  Description: INTERVENTIONS  - Assess for signs and symptoms of bleeding or hemorrhage  - Monitor labs  - Administer supportive blood products/factors as ordered and appropriate  Outcome: Progressing

## 2023-09-01 NOTE — ASSESSMENT & PLAN NOTE
Recent Labs     08/31/23  1451 08/31/23  1626 08/31/23  1934   HGB 6.6* 6.6* 8.2*     8/31 Hgb noted to be 6.4. S/p 1 unit of PRBCs   Today hemoglobin is pending  No signs or symptoms of GI bleed, however there is new bruising over her knees bilaterally.    Will check hemolysis and DIC labs: LDH slightly elevated to 275, haptoglobin is pending   CT C/A/P no signs of internal bleeding

## 2023-09-01 NOTE — PLAN OF CARE
Problem: Potential for Falls  Goal: Patient will remain free of falls  Description: INTERVENTIONS:  - Educate patient/family on patient safety including physical limitations  - Instruct patient to call for assistance with activity   - Consult OT/PT to assist with strengthening/mobility   - Keep Call bell within reach  - Keep bed low and locked with side rails adjusted as appropriate  - Keep care items and personal belongings within reach  - Initiate and maintain comfort rounds  - Make Fall Risk Sign visible to staff  - Offer Toileting every 2 Hours, in advance of need  - Initiate/Maintain bed/chair alarm  - Obtain necessary fall risk management equipment: bed/chair alarm, call bell, gripper socks, walker, bedside commode  - Apply yellow socks and bracelet for high fall risk patients  - Consider moving patient to room near nurses station  Outcome: Progressing     Problem: MOBILITY - ADULT  Goal: Maintain or return to baseline ADL function  Description: INTERVENTIONS:  - Educate patient/family on patient safety including physical limitations  - Instruct patient to call for assistance with activity   - Consult OT/PT to assist with strengthening/mobility   - Keep Call bell within reach  - Keep bed low and locked with side rails adjusted as appropriate  - Keep care items and personal belongings within reach  - Initiate and maintain comfort rounds  - Make Fall Risk Sign visible to staff  - Offer Toileting every 2 Hours, in advance of need  - Initiate/Maintain bed/chair alarm  - Obtain necessary fall risk management equipment: bed/chair alarm, call bell, gripper socks, walker, bedside commode  - Apply yellow socks and bracelet for high fall risk patients  - Consider moving patient to room near nurses station  Outcome: Progressing  Goal: Maintains/Returns to pre admission functional level  Description: INTERVENTIONS:  - Perform BMAT or MOVE assessment daily.   - Set and communicate daily mobility goal to care team and patient. - Collaborate with rehabilitation services on mobility goals if consulted  - Reposition patient every 2 hours.   - Dangle patient 3 times a day  - Stand patient 3 times a day  - Ambulate patient 3 times a day  - Out of bed to chair 3 times a day   - Out of bed for meals  - Out of bed for toileting  - Record patient progress and toleration of activity level   Outcome: Progressing     Problem: PAIN - ADULT  Goal: Verbalizes/displays adequate comfort level or baseline comfort level  Description: Interventions:  - Encourage patient to monitor pain and request assistance  - Assess pain using appropriate pain scale  - Administer analgesics based on type and severity of pain and evaluate response  - Implement non-pharmacological measures as appropriate and evaluate response  - Consider cultural and social influences on pain and pain management  - Notify physician/advanced practitioner if interventions unsuccessful or patient reports new pain  Outcome: Progressing     Problem: INFECTION - ADULT  Goal: Absence or prevention of progression during hospitalization  Description: INTERVENTIONS:  - Assess and monitor for signs and symptoms of infection  - Monitor lab/diagnostic results  - Monitor all insertion sites, i.e. indwelling lines, tubes, and drains  - Monitor endotracheal if appropriate and nasal secretions for changes in amount and color  - [place] appropriate cooling/warming therapies per order  - Administer medications as ordered  - Instruct and encourage patient and family to use good hand hygiene technique  - Identify and instruct in appropriate isolation precautions (contact isolation for CRE) for identified infection/condition  Outcome: Progressing     Problem: SAFETY ADULT  Goal: Patient will remain free of falls  Description: INTERVENTIONS:  - Educate patient/family on patient safety including physical limitations  - Instruct patient to call for assistance with activity   - Consult OT/PT to assist with strengthening/mobility   - Keep Call bell within reach  - Keep bed low and locked with side rails adjusted as appropriate  - Keep care items and personal belongings within reach  - Initiate and maintain comfort rounds  - Make Fall Risk Sign visible to staff  - Offer Toileting every 2 Hours, in advance of need  - Initiate/Maintain bed/chair alarm  - Obtain necessary fall risk management equipment: bed/chair alarm, call bell, gripper socks, walker, bedside commode  - Apply yellow socks and bracelet for high fall risk patients  - Consider moving patient to room near nurses station  Outcome: Progressing  Goal: Maintain or return to baseline ADL function  Description: INTERVENTIONS:  - Educate patient/family on patient safety including physical limitations  - Instruct patient to call for assistance with activity   - Consult OT/PT to assist with strengthening/mobility   - Keep Call bell within reach  - Keep bed low and locked with side rails adjusted as appropriate  - Keep care items and personal belongings within reach  - Initiate and maintain comfort rounds  - Make Fall Risk Sign visible to staff  - Offer Toileting every 2 Hours, in advance of need  - Initiate/Maintain bed/chair alarm  - Obtain necessary fall risk management equipment: bed/chair alarm, call bell, gripper socks, walker, bedside commode  - Apply yellow socks and bracelet for high fall risk patients  - Consider moving patient to room near nurses station  Outcome: Progressing  Goal: Maintains/Returns to pre admission functional level  Description: INTERVENTIONS:  - Perform BMAT or MOVE assessment daily.   - Set and communicate daily mobility goal to care team and patient. - Collaborate with rehabilitation services on mobility goals if consulted  - Reposition patient every 2 hours.   - Dangle patient 3 times a day  - Stand patient 3 times a day  - Ambulate patient 3 times a day  - Out of bed to chair 3 times a day   - Out of bed for meals  - Out of bed for toileting  - Record patient progress and toleration of activity level   Outcome: Progressing     Problem: DISCHARGE PLANNING  Goal: Discharge to home or other facility with appropriate resources  Description: INTERVENTIONS:  - Identify barriers to discharge w/patient  - Arrange for needed discharge resources and transportation as appropriate  - Identify discharge learning needs   - Refer to Case Management Department for coordinating discharge planning if the patient needs post-hospital services based on physician/advanced practitioner order or complex needs related to functional status, cognitive ability, or social support system  Outcome: Progressing     Problem: Knowledge Deficit  Goal: Patient/family/caregiver demonstrates understanding of disease process, treatment plan, medications, and discharge instructions  Description: Complete learning assessment and assess knowledge base.   Interventions:  - Provide teaching at level of understanding  - Provide teaching via preferred learning methods  Outcome: Progressing     Problem: Prexisting or High Potential for Compromised Skin Integrity  Goal: Skin integrity is maintained or improved  Description: INTERVENTIONS:  - Identify patients at risk for skin breakdown  - Assess and monitor skin integrity  - Assess and monitor nutrition and hydration status  - Monitor labs   - Assess for incontinence   - Turn and reposition patient  - Assist with mobility/ambulation  - Relieve pressure over bony prominences  - Avoid friction and shearing  - Provide appropriate hygiene as needed including keeping skin clean and dry  - Evaluate need for skin moisturizer/barrier cream  - Collaborate with interdisciplinary team   - Patient/family teaching  - Consider wound care consult   Outcome: Progressing     Problem: Nutrition/Hydration-ADULT  Goal: Nutrient/Hydration intake appropriate for improving, restoring or maintaining nutritional needs  Description: Monitor and assess patient's nutrition/hydration status for malnutrition. Collaborate with interdisciplinary team and initiate plan and interventions as ordered. Monitor patient's weight and dietary intake as ordered or per policy. Utilize nutrition screening tool and intervene as necessary. Determine patient's food preferences and provide high-protein, high-caloric foods as appropriate.      INTERVENTIONS:  - Monitor oral intake, urinary output, labs, and treatment plans  - Assess nutrition and hydration status and recommend course of action  - Evaluate amount of meals eaten  - Do not leave patient alone while eating due to aspiration risk  - Assist patient with eating if necessary   - Allow adequate time for meals  - Recommend/ encourage appropriate diets, oral nutritional supplements, and vitamin/mineral supplements  - Order, calculate, and assess calorie counts as needed  - Assess need for intravenous fluids  - Provide specific nutrition/hydration education as appropriate  - Include patient in decisions related to nutrition  Outcome: Progressing     Problem: RESPIRATORY - ADULT  Goal: Achieves optimal ventilation and oxygenation  Description: INTERVENTIONS:  - Assess for changes in respiratory status  - Assess for changes in mentation and behavior  - Position to facilitate oxygenation and minimize respiratory effort  - Oxygen administered by appropriate delivery if ordered   - Encourage broncho-pulmonary hygiene including cough, deep breathe, Incentive Spirometry, flutter valve  - Assess the need for suctioning and aspirate as needed  - Assess and instruct to report SOB or any respiratory difficulty  - Respiratory Therapy support as indicated  Outcome: Progressing     Problem: GENITOURINARY - ADULT  Goal: Maintains or returns to baseline urinary function  Description: INTERVENTIONS:  - Assess urinary function  - Encourage oral fluids to ensure adequate hydration if ordered  - Administer IV fluids as ordered to ensure adequate hydration  - Administer ordered medications as needed  - Offer frequent toileting  - Follow urinary retention protocol if ordered  Outcome: Progressing  Goal: Urinary catheter remains patent  Description: INTERVENTIONS:  - Assess patency of urinary catheter  - Follow guidelines for intermittent irrigation of non-functioning urinary catheter  Outcome: Progressing     Problem: SAFETY,RESTRAINT: NV/NON-SELF DESTRUCTIVE BEHAVIOR  Goal: Remains free of harm/injury (restraint for non violent/non self-detsructive behavior)  Description: INTERVENTIONS:  - Instruct patient/family regarding restraint use   - Assess and monitor physiologic and psychological status   - Provide interventions and comfort measures to meet assessed patient needs   - Identify and implement measures to help patient regain control  - Assess readiness for release of restraint   Outcome: Progressing  Goal: Returns to optimal restraint-free functioning  Description: INTERVENTIONS:  - Assess the patient's behavior and symptoms that indicate continued need for restraint  - Identify and implement measures to help patient regain control  - Assess readiness for release of restraint   Outcome: Progressing     Problem: NEUROSENSORY - ADULT  Goal: Achieves stable or improved neurological status  Description: INTERVENTIONS  - Monitor and report changes in neurological status  - Monitor vital signs such as temperature, blood pressure, glucose, and any other labs ordered   - Initiate measures to prevent increased intracranial pressure  - Monitor for seizure activity and implement precautions if appropriate      Outcome: Progressing  Goal: Achieves maximal functionality and self care  Description: INTERVENTIONS  - Monitor swallowing and airway patency with patient fatigue and changes in neurological status  - Encourage and assist patient to increase activity and self care.    - Encourage visually impaired, hearing impaired and aphasic patients to use assistive/communication devices  Outcome: Progressing     Problem: GASTROINTESTINAL - ADULT  Goal: Maintains adequate nutritional intake  Description: INTERVENTIONS:  - Monitor percentage of each meal consumed  - Identify factors contributing to decreased intake, treat as appropriate  - Assist with meals as needed  - Monitor I&O, weight, and lab values if indicated  - Obtain nutrition services referral as needed  Outcome: Progressing  Goal: Establish and maintain optimal ostomy function  Description: INTERVENTIONS:  - Assess bowel function  - Encourage oral fluids to ensure adequate hydration  - Administer IV fluids if ordered to ensure adequate hydration   - Administer ordered medications as needed  - Encourage mobilization and activity  - Nutrition services referral to assist patient with appropriate food choices  - Assess stoma site  - Consider wound care consult   Outcome: Progressing     Problem: METABOLIC, FLUID AND ELECTROLYTES - ADULT  Goal: Electrolytes maintained within normal limits  Description: INTERVENTIONS:  - Monitor labs and assess patient for signs and symptoms of electrolyte imbalances  - Administer electrolyte replacement as ordered  - Monitor response to electrolyte replacements, including repeat lab results as appropriate  - Instruct patient on fluid and nutrition as appropriate  Outcome: Progressing  Goal: Fluid balance maintained  Description: INTERVENTIONS:  - Monitor labs   - Monitor I/O and WT  - Instruct patient on fluid and nutrition as appropriate  - Assess for signs & symptoms of volume excess or deficit  Outcome: Progressing  Goal: Glucose maintained within target range  Description: INTERVENTIONS:  - Monitor Blood Glucose as ordered  - Assess for signs and symptoms of hyperglycemia and hypoglycemia  - Administer ordered medications to maintain glucose within target range  - Assess nutritional intake and initiate nutrition service referral as needed  Outcome: Progressing     Problem: SKIN/TISSUE INTEGRITY - ADULT  Goal: Skin Integrity remains intact(Skin Breakdown Prevention)  Description: Assess:  -Perform Paul assessment every   -Clean and moisturize skin every   -Inspect skin when repositioning, toileting, and assisting with ADLS  -Assess under medical devices such as  every   -Assess extremities for adequate circulation and sensation     Bed Management:  -Have minimal linens on bed & keep smooth, unwrinkled  -Change linens as needed when moist or perspiring  -Avoid sitting or lying in one position for more than  hours while in bed  -Keep HOB at degrees     Toileting:  -Offer bedside commode  -Assess for incontinence every   -Use incontinent care products after each incontinent episode such as     Activity:  -Mobilize patient  times a day  -Encourage activity and walks on unit  -Encourage or provide ROM exercises   -Turn and reposition patient every  Hours  -Use appropriate equipment to lift or move patient in bed  -Instruct/ Assist with weight shifting every  when out of bed in chair  -Consider limitation of chair time  hour intervals    Skin Care:  -Avoid use of baby powder, tape, friction and shearing, hot water or constrictive clothing  -Relieve pressure over bony prominences using   -Do not massage red bony areas    Next Steps:  -Teach patient strategies to minimize risks such as    -Consider consults to  interdisciplinary teams such as   Outcome: Progressing  Goal: Incision(s), wounds(s) or drain site(s) healing without S/S of infection  Description: INTERVENTIONS  - Assess and document dressing, incision, wound bed, drain sites and surrounding tissue  - Provide patient and family education  - Perform skin care/dressing changes every  Outcome: Progressing     Problem: HEMATOLOGIC - ADULT  Goal: Maintains hematologic stability  Description: INTERVENTIONS  - Assess for signs and symptoms of bleeding or hemorrhage  - Monitor labs  - Administer supportive blood products/factors as ordered and appropriate  Outcome: Progressing

## 2023-09-02 PROBLEM — E87.70 VOLUME OVERLOAD: Status: ACTIVE | Noted: 2023-01-01

## 2023-09-02 PROBLEM — D69.6 THROMBOCYTOPENIA (HCC): Status: ACTIVE | Noted: 2023-01-01

## 2023-09-02 PROBLEM — T07.XXXA MULTIPLE WOUNDS: Status: ACTIVE | Noted: 2023-01-01

## 2023-09-02 NOTE — PLAN OF CARE
Problem: Potential for Falls  Goal: Patient will remain free of falls  Description: INTERVENTIONS:  - Educate patient/family on patient safety including physical limitations  - Instruct patient to call for assistance with activity   - Consult OT/PT to assist with strengthening/mobility   - Keep Call bell within reach  - Keep bed low and locked with side rails adjusted as appropriate  - Keep care items and personal belongings within reach  - Initiate and maintain comfort rounds  - Make Fall Risk Sign visible to staff  - Offer Toileting every 2 Hours, in advance of need  - Initiate/Maintain bed/chair alarm  - Obtain necessary fall risk management equipment: bed/chair alarm, call bell, gripper socks, walker, bedside commode  - Apply yellow socks and bracelet for high fall risk patients  - Consider moving patient to room near nurses station  Outcome: Progressing     Problem: MOBILITY - ADULT  Goal: Maintain or return to baseline ADL function  Description: INTERVENTIONS:  - Educate patient/family on patient safety including physical limitations  - Instruct patient to call for assistance with activity   - Consult OT/PT to assist with strengthening/mobility   - Keep Call bell within reach  - Keep bed low and locked with side rails adjusted as appropriate  - Keep care items and personal belongings within reach  - Initiate and maintain comfort rounds  - Make Fall Risk Sign visible to staff  - Offer Toileting every 2 Hours, in advance of need  - Initiate/Maintain bed/chair alarm  - Obtain necessary fall risk management equipment: bed/chair alarm, call bell, gripper socks, walker, bedside commode  - Apply yellow socks and bracelet for high fall risk patients  - Consider moving patient to room near nurses station  Outcome: Progressing  Goal: Maintains/Returns to pre admission functional level  Description: INTERVENTIONS:  - Perform BMAT or MOVE assessment daily.   - Set and communicate daily mobility goal to care team and patient. - Collaborate with rehabilitation services on mobility goals if consulted  - Reposition patient every 2 hours.   - Dangle patient 3 times a day  - Stand patient 3 times a day  - Ambulate patient 3 times a day  - Out of bed to chair 3 times a day   - Out of bed for meals  - Out of bed for toileting  - Record patient progress and toleration of activity level   Outcome: Progressing     Problem: PAIN - ADULT  Goal: Verbalizes/displays adequate comfort level or baseline comfort level  Description: Interventions:  - Encourage patient to monitor pain and request assistance  - Assess pain using appropriate pain scale  - Administer analgesics based on type and severity of pain and evaluate response  - Implement non-pharmacological measures as appropriate and evaluate response  - Consider cultural and social influences on pain and pain management  - Notify physician/advanced practitioner if interventions unsuccessful or patient reports new pain  Outcome: Progressing

## 2023-09-02 NOTE — ASSESSMENT & PLAN NOTE
· Patient has a history of seronegative multiple joint arthropathy  · Currently on hydroxychloroquine 200 mg twice daily  · Patient follows up with rheumatologist    Plan:  · Holding plaquenil

## 2023-09-02 NOTE — ASSESSMENT & PLAN NOTE
· Patient was brought to the ED after being in altered mental status, requiring more oxygen. · History of multiple episodes of vomiting at night prior to admission  · Initially admitted with aspiration pneumonia, treated with a course of antibiotics and improved  · Remained on oxygen therapy, but developed a change in MS and required transfer back to the ICU  · She was noted to have an increase WOB and was placed on BIPAP  · This current episode does not appear to be related to an acute lung issue but more likely related to acute hepatic failure  · Patient is status postcardiac arrest, was intubated   · CT H - no change from 3 day ago       Plan:  · Vent : 16/400/6/50% (Abg overnight showed hypoxia, Fio 2 was increased to 50 % from 40%)  · goal is to maintain her oxygen saturation > 90%  · Not appropriate for extubation at this time.

## 2023-09-02 NOTE — ASSESSMENT & PLAN NOTE
· Most likely contributing to her respiratory status initially  · peg tube placed 8/17  · Continue tube feeds

## 2023-09-02 NOTE — ASSESSMENT & PLAN NOTE
Recent Labs     08/31/23  0421 09/01/23  0522 09/02/23  0554   CREATININE 1.52* 1.33* 1.22   EGFR 34 40 45     Estimated Creatinine Clearance: 41.9 mL/min (by C-G formula based on SCr of 1.22 mg/dL). Suspect this is likely in the setting of poor perfusion and cardiorenal syndrome. Plan;   Consider increasing milrinone dose. Avoid nephrotoxic agents. Continue monitoring renal indices.

## 2023-09-02 NOTE — ASSESSMENT & PLAN NOTE
· Current hypoglycemia may be related to her liver failure  · S/p D 10  · No hypoglycemic episodes noted today.     Plan:  · Blood glucose checks every 2-3 hours  Hypoglycemia protocol

## 2023-09-02 NOTE — ASSESSMENT & PLAN NOTE
Multiple wounds and skin lesions back, lower and upper extremities  Bluish discoloration of the toes bilaterally  Multiple blisters on the bilateral feet  · Proceed with wound care recommendations

## 2023-09-02 NOTE — ASSESSMENT & PLAN NOTE
· Current hypoglycemia may be related to her liver failure  · S/p D10  · No hypoglycemic episodes noted today.     Plan:  Q6 finger sticks

## 2023-09-02 NOTE — ASSESSMENT & PLAN NOTE
Recent Labs     08/31/23  0421 09/01/23  0522 09/02/23  0554   CREATININE 1.52* 1.33* 1.22   EGFR 34 40 45     Estimated Creatinine Clearance: 41.9 mL/min (by C-G formula based on SCr of 1.22 mg/dL). Suspect this is likely in the setting of poor perfusion and cardiorenal syndrome. Plan:  Avoid nephrotoxic agents. Continue monitoring renal indices.

## 2023-09-02 NOTE — ASSESSMENT & PLAN NOTE
· Platelets 38 today  · No obvious source of bleeding, patient did not receive heparin  · CT abdomen pelvis showed no retroperitoneal hematoma  · DIC panel was done on 80/31s,: fibrin products more than 10, plasminogen low 20, Antithrombin low 19, APTT 40, INR 3.57, D-dimer elevated 8.49, fibrinogen low 169, platelets 41  · Juan panel was equivocal, after globin normal, LDH borderline elevated  275  Plan:  · Monitor for active bleeding  · Repeat DIC panel  · Repeat LDH  · Monitor platelets, transfuse as needed  · Hold DVT ppx

## 2023-09-02 NOTE — ASSESSMENT & PLAN NOTE
· Patient had right MCA stroke, s/p mechanical thrombectomy earlier this year  · Although the patient moves all the limbs, there is residual weakness in the left upper and lower limb  · CT head on August 8 shows No acute intracranial abnormality    Plan:  · Hold Eliquis for now given elevated INR in the setting of her liver failure  · No DVT ppx in setting of thrombocytopenia  · Frequent neurochecks  · CTH remains stable

## 2023-09-02 NOTE — ASSESSMENT & PLAN NOTE
· Noted to be more altered on 8/26 with eye opening to name but not verbal, not following commands  · 8/27 Mental status wavering this morning  · Ammonia level was normal blood sugar was normal head cT without acute abnormality  · Liver enzymes elevated, no asterixis on exam    Plan:  · Continue monitoring fingerstick blood glucose every 6 hours- high risk for hypoglycemia  · Hypoglycemia protocol  · SSI  · Frequent neurochecks

## 2023-09-02 NOTE — ASSESSMENT & PLAN NOTE
· Noted to be more altered on 8/26 with eye opening to name but not verbal, not following commands  · 8/27 Mental status wavering this morning  · Ammonia level was normal blood sugar was normal head cT without acute abnormality  · Liver enzymes elevated, no asterixis on exam    Plan:  · Continue monitoring fingerstick blood glucose every 6 hours- high risk for hypoglycemia  · Hypoglycemia protocol  · Pt is hyperglycemic, start ISS  · Frequent neurochecks

## 2023-09-02 NOTE — PROGRESS NOTES
[address] [facility]  Progress Note  Name: Reyes Honer  MRN: [id number]  Unit/Bed#: ICU 04 I Date of Admission: 8/10/2023   Date of Service: 9/2/2023 I Hospital Day: 23    Assessment/Plan   * Acute respiratory failure with hypoxia [facility])  Assessment & Plan  · Patient was brought to the ED after being in altered mental status, requiring more oxygen. · History of multiple episodes of vomiting at night prior to admission  · Initially admitted with aspiration pneumonia, treated with a course of antibiotics and improved  · Remained on oxygen therapy, but developed a change in MS and required transfer back to the ICU  · She was noted to have an increase WOB and was placed on BIPAP  · This current episode does not appear to be related to an acute lung issue but more likely related to acute hepatic failure  · Patient is status postcardiac arrest, was intubated   · CT H - no change from 3 day ago       Plan:  · Vent : 16/400/6/50% (Abg overnight showed hypoxia, Fio 2 was increased to 50 % from 40%)  · goal is to maintain her oxygen saturation > 90%  · Not appropriate for extubation at this time. Cardiac arrest [facility])  Assessment & Plan  Patient is a 29-year-old female  who was status postcardiac arrest with ROSC,   · It appeared patient became bradycardic and agonal respiration, patient arrested and CPR and ACLS protocol -CPR was started received epinephrine x 3, mag sulfate, bicarb x 2 , fibrillation, serum chloride dextrose presents from 927 to 939  · Patient now is a DNR level 2   · Currently on Milrinone 0.13 mcg/kg/min and Vasopressin 0.04units/min  · Consider increasing milrinone dose.        Volume overload  Assessment & Plan  Most likely multifactorial, multiple comorbidities requiring infusions, cardiogenic shock with hypotension and kidney hypoperfusion, poor nutritional status and low albumin  Positive balance 1700 cc since admission  Status post Lasix yesterday with good response urine output 2100  Plan:  · Strict I's and O's  · Daily weights  · We will consider additional Lasix      Acute liver failure  Assessment & Plan  · The patient had a slight transaminitis during admission but over the past few days has steadily risen and then doubled on 8/26 with his AST 2800, ALT 1114  · Severe elevation of INR, out of proportion to liver injury  · Multifactorial - She was found to have a slightly elevated tylenol level (that continues to normalize) which may be a contributing factor however, not thought to be the only issue (ischemic vs viral hepatitis)  · She has HF, which may contribute to hepatotoxicity  · Additionally she was on a few drugs (Plaquinel, wellbutrin and lamictal) which can all be associated with liver failure  · Plaquinel and Wellbutrin held  · Doppler studies were normal, no thrombus  · Hepatitis panel on 8.24 was positive for hep C antibodies otherwise negative  · EBV ab/ag positive  · HSV, and VZV panels pending  · Started on acyclovir until HSV can be ruled out  · GI is following- appreciate their assistance with her care  · S/p NAC and fomepizole, dc on  8/28 as per Medical Toxicology  · Supportive care  · Vitamin K supplementation  · She would not be a candidate for a liver transplant  Recent Labs     08/31/23  0421 09/01/23  0522 09/02/23  0554   AST 91* 56* 38   * 128* 97*   ALKPHOS 50 56 60   TBILI 3.60* 3.77* 3.31*   BILIDIR  --   --  1.63*     · LFTs now improving. Likely in the setting of shock liver vs acetaminophen toxicity. Multiple wounds  Assessment & Plan  Multiple wounds and skin lesions back, lower and upper extremities  Bluish discoloration of the toes bilaterally  Multiple blisters on the bilateral feet  · Proceed with wound care recommendations    Hypoglycemia  Assessment & Plan  · Current hypoglycemia may be related to her liver failure  · S/p D 10  · No hypoglycemic episodes noted today.     Plan:  · Blood glucose checks every 2-3 hours  Hypoglycemia protocol      Thrombocytopenia (HCC)  Assessment & Plan  · Platelets 38 today  · No obvious source of bleeding, patient did not receive heparin  · CT abdomen pelvis showed no retroperitoneal hematoma  · DIC panel was done on 80/31s,: fibrin products more than 10, plasminogen low 20, Antithrombin low 19, APTT 40, INR 3.57, D-dimer elevated 8.49, fibrinogen low 169, platelets 41  · Juan panel was equivocal, after globin normal, LDH borderline elevated  275  Plan:  · Monitor for active bleeding  · Repeat DIC panel  · Repeat LDH  · Monitor platelets, transfuse as needed    Closed T12 fracture [facility])  Assessment & Plan  Patient has a history of low impact fall that resulted in multiple T-spine fractures  Currently pain controlled by morphine    Plan:  · Hold gabapentin  · She is on narcotics chronically, but has been hospitalized for about 2 weeks with variable use  · PRN fentanyl       Dysphagia  Assessment & Plan  · Most likely contributing to her respiratory status initially  · peg tube placed 8/17  · Tube feeds restarted on 9/1      Altered mental status  Assessment & Plan  · Noted to be more altered on 8/26 with eye opening to name but not verbal, not following commands  · 8/27 Mental status wavering this morning  · Ammonia level was normal blood sugar was normal head cT without acute abnormality  · Liver enzymes elevated, no asterixis on exam    Plan:  · Continue monitoring fingerstick blood glucose every 6 hours- high risk for hypoglycemia  · Hypoglycemia protocol  · Pt is hyperglycemic, start ISS  · Frequent neurochecks    History of stroke  Assessment & Plan  · Patient had right MCA stroke, s/p mechanical thrombectomy earlier this year  · Although the patient moves all the limbs, there is residual weakness in the left upper and lower limb  · CT head on August 8 shows No acute intracranial abnormality    Plan:  · Hold Eliquis for now given elevated INR in the setting of her liver failure  · Frequent neurochecks  · CTH remains stable       Hypercalcemia  Assessment & Plan  · 2/2 hyperparathyroidism  · follows with endocrine  · sensipar on hold 2/2 unable to crush    HFrEF (heart failure with reduced ejection fraction) ([facility])  Assessment & Plan  Wt Readings from Last 3 Encounters:   09/02/23 70.4 kg (155 lb 3.3 oz)   07/29/23 49.4 kg (108 lb 14.4 oz)   06/30/23 55.3 kg (122 lb)   8/27 CXR showed pulmonary congestion  Positive fluid balance since admission might be affected by undocumented urine   On PE euvolemic, soft tissue swelling, anasarca on imaging, + 17 l balance  8/29 S/p cardiac arrest with ROSC   Repeat echo EF 15-20 %   Beta blocker on hold due to hypotension. Lasix was given with poor response  Discontinue Albumin 25% 25g q6h  8/30 Started on milrinone, nor epi, NE    9/1 DC NE, started on vasopressin, DC NE, c/w milrinone,  additional lasix given with good response  Will consider additional diuresis today    LAILA (acute kidney injury) [facility])  Assessment & Plan  Recent Labs     08/31/23  0421 09/01/23  0522 09/02/23  0554   CREATININE 1.52* 1.33* 1.22   EGFR 34 40 45     Estimated Creatinine Clearance: 41.9 mL/min (by C-G formula based on SCr of 1.22 mg/dL). Suspect this is likely in the setting of poor perfusion and cardiorenal syndrome. Plan;   Consider increasing milrinone dose. Avoid nephrotoxic agents. Continue monitoring renal indices. Rheumatoid arthritis of multiple sites with negative rheumatoid factor ([address])  Assessment & Plan  · Patient has a history of seronegative multiple joint arthropathy  · Currently on hydroxychloroquine 200 mg twice daily  · Patient follows up with rheumatologist    Plan:  · We are going to hold her plaquenil for now.  Though she has been on it for a while, it can be associated with fulminant liver failure    Bipolar depression ([address])  Assessment & Plan  · no ativan  · On wellbutrin and duloxetine  · Wellbutrin was stopped secondary to hepatic toxicity risk  · cont gabapentin and lamictal. Lower dose started 2/2 sedation and uptitrate as able. · Lamictal is also associated with hepatic toxicity but given her seizure risk, we will continue this for now      Anemia  Assessment & Plan  Recent Labs     08/31/23  1934 09/01/23  0522 09/02/23  0554   HGB 8.2* 8.4* 8.8*     8/31 Hgb noted to be 6.4. S/p 1 unit of PRBCs   Today hemoglobin is 8.8  No signs or symptoms of GI bleed, however there is new bruising over her knees bilaterally, and upper extremities  DIC labs: LDH slightly elevated to 275, haptoglobin normal   CT C/A/P no signs of internal bleeding  DVT doppler LE  no DVT           ICU Core Measures     Vented Patient  VAP Bundle  VAP Bundle: Not ordered      A: Assess, Prevent, and Manage Pain · Has pain been assessed? Yes  · Need for changes to pain regimen? No   B: Both Spontaneous Awakening Trials (SATs) and Spontaneous Breathing Trials (SBTs) · Plan to perform spontaneous awakening trial today? Yes   · Plan to perform spontaneous breathing trial today? Yes   · Obvious barriers to extubation? Yes   C: Choice of Sedation · RASS Goal: -3 Moderate Sedation, -2 Light Sedation or 0 Alert and Calm  · Need for changes to sedation or analgesia regimen? No   D: Delirium · CAM-ICU: Negative   E: Early Mobility  · Plan for early mobility? No   F: Family Engagement · Plan for family engagement today? Yes       Review of Invasive Devices: [place] Plan: Continue for accurate I/O monitoring for 48 hours  Central access plan: Patient has multiple central venous catheters. [place] Plan: Keep arterial line for hemodynamic monitoring    Prophylaxis:  VTE VTE covered by:    Robi Duke       Stress Ulcer  covered byomeprazole (PRILOSEC) suspension 2 mg/mL [[id number]]       Subjective Patient is sedated, patient is opening eyes to her name, appears comfortable, not in acute distress. .  HPI/24hr events: No acute events overnight, patient is hemodynamically stable. Unable to finish review of system as patient is sedated     Objective                            Vitals I/O      Most Recent Min/Max in 24hrs   Temp 98.6 °F (37 °C) Temp  Min: 98.2 °F (36.8 °C)  Max: 99.7 °F (37.6 °C)   Pulse (!) 106 Pulse  Min: 98  Max: 114   Resp 18 Resp  Min: 13  Max: 21   /61 No data recorded   O2 Sat 99 % SpO2  Min: 89 %  Max: 100 %      Intake/Output Summary (Last 24 hours) at 9/2/2023 0827  Last data filed at 9/2/2023 0601  Gross per 24 hour   Intake 1032. 85 ml   Output 2010 ml   Net -977.15 ml         Diet Enteral/Parenteral; Tube Feeding No Oral Diet; Jevity 1.2 Prabhjot; Continuous; 50; 100; Every 4 hours     Invasive Monitoring Physical exam   Arterial Line  [place] /60  Arterial Line BP  Min: 102/50  Max: 134/70   MAP 84 mmHg  Arterial Line MAP (mmHg)  Min: 70 mmHg  Max: 94 mmHg     PA Catheter   Most Recent  Min/Max in 24hrs    PAP   No data recorded   CVP   No data recorded   CI    No data recorded   SVR   No data recorded           Neuro Invasive Monitoring   Most Recent  Min/Max in 24hrs    GCS 6  [place] Coma Scale Score  Min: 6  Max: 6    ICP     No data recorded    CPP (cuff)    No data recorded    CPP(carrington)    No data recorded   CVP   No data recorded     ECMO:   Most Recent Min/Max in 24hrs   Flow (LPM)   No data recorded   Speed (RPM)   No data recorded    Flow   No data recorded   FIO2        LVAD   Most Recent  Min/Max in 24hrs    Pump Flow (LPM)   No data recorded    Pump Speed (RPM)   No data recorded   Pump Power    No data recorded    Pulsatility Index   No data recorded    Physical Exam  Eyes:      Conjunctiva/sclera: Conjunctivae normal.   Skin:     Findings: Lesion and wound present. Comments: Bluish discoloration of the toes bilaterally  Multiple blisters on the bilateral le   HENT:      Head: Normocephalic and atraumatic. Nose: No congestion.       Mouth/Throat:      Mouth: Mucous membranes are moist.   Neck:      Comments: Extended neck, status post stroke Cardiovascular:      Rate and Rhythm: Normal rate and regular rhythm. Pulses: Normal pulses. Abdominal:      Palpations: Abdomen is soft. Comments: PEG tube is in place   Constitutional:       Appearance: She is ill-appearing. Interventions: She is intubated. Pulmonary:      Effort: She is intubated.       Comments: Coarse breathing consistent with mechanical ventilation,  Diminished breathing sounds  Vent settings: 16/400/6/50%    Neurological:      Comments: Sedated  Patient is opening eyes on verbal stimulation   Genitourinary/Anorectal:  [place]       Diagnostic Studies      EKG: Reviewed  Imaging: DVT ultrasound reviewed, no DVT noticed     Medications:  Scheduled PRN   bisacodyl, 10 mg, Daily  DULoxetine, 60 mg, BID  furosemide, 40 mg, Daily  gabapentin, 100 mg, HS  glycerin-hypromellose-, 1 drop, TID  insulin lispro, 1-5 Units, Q6H KHARI  lamoTRIgine, 50 mg, HS  lidocaine, 1 patch, Daily  omeprazole (PRILOSEC) suspension 2 mg/mL, 20 mg, Daily  oxyCODONE, 5 mg, Q8H  polyethylene glycol, 17 g, Daily      fentanyl citrate (PF), 50 mcg, Q2H PRN       Continuous    milrinone (Primacor) infusion, 0.13 mcg/kg/min, Last Rate: 0.13 mcg/kg/min (09/02/23 0601)  vasopressin, 0.04 Units/min, Last Rate: 0.04 Units/min (09/02/23 0601)         Labs:    CBC    Recent Labs     09/01/23 0522 09/02/23  0554   WBC 9.88 8.15   HGB 8.4* 8.8*   HCT 27.0* 28.1*   PLT 57* 38*     BMP    Recent Labs     09/01/23 0522 09/01/23  1708 09/02/23  0554   SODIUM 148*  --  147   K 2.6* 3.7 3.6   *  --  112*   CO2 28  --  29   AGAP 9  --  6   BUN 48*  --  53*   CREATININE 1.33*  --  1.22   CALCIUM 9.9  --  10.2       Coags    No recent results     Additional Electrolytes  Recent Labs     09/01/23  0522   MG 2.0   PHOS 3.4          Blood Gas    Recent Labs     09/01/23  0557   PHART 7.499*   [id number] 34.9*   PO2ART 58.9*   [id number] 26.5   BEART 3.3   SOURCE Line, Arterial     Recent Labs 09/01/23  0557   SOURCE Line, Arterial    LFTs  Recent Labs     09/01/23 0522 09/02/23  0554   * 97*   AST 56* 38   ALKPHOS 56 60   ALB 3.1* 3.0*   TBILI 3.77* 3.31*       Infectious  No recent results  Glucose  Recent Labs     09/01/23 0522 09/02/23  0554   GLUC 96 154*               Critical Care Time Delivered: Upon my evaluation, this patient had a high probability of imminent or life-threatening deterioration due to Cardiogenic shock, thrombocytopenia, anemia, LAILA, acute liver failure, which required my direct attention, intervention, and personal management. I have personally provided 30 minutes of critical care time, exclusive of procedures, teaching, family meetings, and any prior time recorded by providers other than myself.      Robin Robles MD

## 2023-09-02 NOTE — ASSESSMENT & PLAN NOTE
Wt Readings from Last 3 Encounters:   09/02/23 70.4 kg (155 lb 3.3 oz)   07/29/23 49.4 kg (108 lb 14.4 oz)   06/30/23 55.3 kg (122 lb)   8/27 CXR showed pulmonary congestion  Positive fluid balance since admission might be affected by undocumented urine   On PE euvolemic, soft tissue swelling, anasarca on imaging, + 17 l balance  8/29 S/p cardiac arrest with ROSC   Repeat echo EF 15-20 %   Beta blocker on hold due to hypotension.  Lasix was given with poor response  Discontinue Albumin 25% 25g q6h  8/30 Started on milrinone, nor epi, NE    9/1 DC NE, started on vasopressin, DC NE, c/w milrinone,  additional lasix given with good response  Will consider additional diuresis today

## 2023-09-02 NOTE — ASSESSMENT & PLAN NOTE
· The patient had a slight transaminitis during admission but over the past few days has steadily risen and then doubled on 8/26 with his AST 2800, ALT 1114  · Severe elevation of INR, out of proportion to liver injury  · Multifactorial - She was found to have a slightly elevated tylenol level (that continues to normalize) which may be a contributing factor however, not thought to be the only issue (ischemic vs viral hepatitis)  · She has HF, which may contribute to hepatotoxicity  · Additionally she was on a few drugs (Plaquinel, wellbutrin and lamictal) which can all be associated with liver failure  · Plaquinel and Wellbutrin held  · Doppler studies were normal, no thrombus  · Hepatitis panel on 8.24 was positive for hep C antibodies otherwise negative  · EBV ab/ag positive  · HSV, and VZV panels pending  · Started on acyclovir until HSV can be ruled out  · GI is following- appreciate their assistance with her care  · S/p NAC and fomepizole, dc on  8/28 as per Medical Toxicology  · Supportive care  · Vitamin K supplementation  · She would not be a candidate for a liver transplant  Recent Labs     08/31/23  0421 09/01/23  0522 09/02/23  0554   AST 91* 56* 38   * 128* 97*   ALKPHOS 50 56 60   TBILI 3.60* 3.77* 3.31*   BILIDIR  --   --  1.63*     · LFTs now improving. Likely in the setting of shock liver vs acetaminophen toxicity.

## 2023-09-02 NOTE — PLAN OF CARE
Problem: Potential for Falls  Goal: Patient will remain free of falls  Description: INTERVENTIONS:  - Educate patient/family on patient safety including physical limitations  - Instruct patient to call for assistance with activity   - Consult OT/PT to assist with strengthening/mobility   - Keep Call bell within reach  - Keep bed low and locked with side rails adjusted as appropriate  - Keep care items and personal belongings within reach  - Initiate and maintain comfort rounds  - Make Fall Risk Sign visible to staff  - Offer Toileting every 2 Hours, in advance of need  - Initiate/Maintain bed/chair alarm  - Obtain necessary fall risk management equipment: bed/chair alarm, call bell, gripper socks, walker, bedside commode  - Apply yellow socks and bracelet for high fall risk patients  - Consider moving patient to room near nurses station  Outcome: Progressing     Problem: MOBILITY - ADULT  Goal: Maintain or return to baseline ADL function  Description: INTERVENTIONS:  - Educate patient/family on patient safety including physical limitations  - Instruct patient to call for assistance with activity   - Consult OT/PT to assist with strengthening/mobility   - Keep Call bell within reach  - Keep bed low and locked with side rails adjusted as appropriate  - Keep care items and personal belongings within reach  - Initiate and maintain comfort rounds  - Make Fall Risk Sign visible to staff  - Offer Toileting every 2 Hours, in advance of need  - Initiate/Maintain bed/chair alarm  - Obtain necessary fall risk management equipment: bed/chair alarm, call bell, gripper socks, walker, bedside commode  - Apply yellow socks and bracelet for high fall risk patients  - Consider moving patient to room near nurses station  Outcome: Not Progressing  Goal: Maintains/Returns to pre admission functional level  Description: INTERVENTIONS:  - Perform BMAT or MOVE assessment daily.   - Set and communicate daily mobility goal to care team and patient. - Collaborate with rehabilitation services on mobility goals if consulted  - Reposition patient every 2 hours.   - Dangle patient 3 times a day  - Stand patient 3 times a day  - Ambulate patient 3 times a day  - Out of bed to chair 3 times a day   - Out of bed for meals  - Out of bed for toileting  - Record patient progress and toleration of activity level   Outcome: Not Progressing     Problem: PAIN - ADULT  Goal: Verbalizes/displays adequate comfort level or baseline comfort level  Description: Interventions:  - Encourage patient to monitor pain and request assistance  - Assess pain using appropriate pain scale  - Administer analgesics based on type and severity of pain and evaluate response  - Implement non-pharmacological measures as appropriate and evaluate response  - Consider cultural and social influences on pain and pain management  - Notify physician/advanced practitioner if interventions unsuccessful or patient reports new pain  Outcome: Progressing     Problem: INFECTION - ADULT  Goal: Absence or prevention of progression during hospitalization  Description: INTERVENTIONS:  - Assess and monitor for signs and symptoms of infection  - Monitor lab/diagnostic results  - Monitor all insertion sites, i.e. indwelling lines, tubes, and drains  - Monitor endotracheal if appropriate and nasal secretions for changes in amount and color  - [place] appropriate cooling/warming therapies per order  - Administer medications as ordered  - Instruct and encourage patient and family to use good hand hygiene technique  - Identify and instruct in appropriate isolation precautions (contact isolation for CRE) for identified infection/condition  Outcome: Progressing     Problem: SAFETY ADULT  Goal: Patient will remain free of falls  Description: INTERVENTIONS:  - Educate patient/family on patient safety including physical limitations  - Instruct patient to call for assistance with activity   - Consult OT/PT to assist with strengthening/mobility   - Keep Call bell within reach  - Keep bed low and locked with side rails adjusted as appropriate  - Keep care items and personal belongings within reach  - Initiate and maintain comfort rounds  - Make Fall Risk Sign visible to staff  - Offer Toileting every 2 Hours, in advance of need  - Initiate/Maintain bed/chair alarm  - Obtain necessary fall risk management equipment: bed/chair alarm, call bell, gripper socks, walker, bedside commode  - Apply yellow socks and bracelet for high fall risk patients  - Consider moving patient to room near nurses station  Outcome: Progressing  Goal: Maintain or return to baseline ADL function  Description: INTERVENTIONS:  - Educate patient/family on patient safety including physical limitations  - Instruct patient to call for assistance with activity   - Consult OT/PT to assist with strengthening/mobility   - Keep Call bell within reach  - Keep bed low and locked with side rails adjusted as appropriate  - Keep care items and personal belongings within reach  - Initiate and maintain comfort rounds  - Make Fall Risk Sign visible to staff  - Offer Toileting every 2 Hours, in advance of need  - Initiate/Maintain bed/chair alarm  - Obtain necessary fall risk management equipment: bed/chair alarm, call bell, gripper socks, walker, bedside commode  - Apply yellow socks and bracelet for high fall risk patients  - Consider moving patient to room near nurses station  Outcome: Not Progressing  Goal: Maintains/Returns to pre admission functional level  Description: INTERVENTIONS:  - Perform BMAT or MOVE assessment daily.   - Set and communicate daily mobility goal to care team and patient. - Collaborate with rehabilitation services on mobility goals if consulted  - Reposition patient every 2 hours.   - Dangle patient 3 times a day  - Stand patient 3 times a day  - Ambulate patient 3 times a day  - Out of bed to chair 3 times a day   - Out of bed for meals  - Out of bed for toileting  - Record patient progress and toleration of activity level   Outcome: Not Progressing     Problem: DISCHARGE PLANNING  Goal: Discharge to home or other facility with appropriate resources  Description: INTERVENTIONS:  - Identify barriers to discharge w/patient  - Arrange for needed discharge resources and transportation as appropriate  - Identify discharge learning needs   - Refer to Case Management Department for coordinating discharge planning if the patient needs post-hospital services based on physician/advanced practitioner order or complex needs related to functional status, cognitive ability, or social support system  Outcome: Progressing     Problem: Knowledge Deficit  Goal: Patient/family/caregiver demonstrates understanding of disease process, treatment plan, medications, and discharge instructions  Description: Complete learning assessment and assess knowledge base.   Interventions:  - Provide teaching at level of understanding  - Provide teaching via preferred learning methods  Outcome: Not Progressing     Problem: Prexisting or High Potential for Compromised Skin Integrity  Goal: Skin integrity is maintained or improved  Description: INTERVENTIONS:  - Identify patients at risk for skin breakdown  - Assess and monitor skin integrity  - Assess and monitor nutrition and hydration status  - Monitor labs   - Assess for incontinence   - Turn and reposition patient  - Assist with mobility/ambulation  - Relieve pressure over bony prominences  - Avoid friction and shearing  - Provide appropriate hygiene as needed including keeping skin clean and dry  - Evaluate need for skin moisturizer/barrier cream  - Collaborate with interdisciplinary team   - Patient/family teaching  - Consider wound care consult   Outcome: Progressing     Problem: Nutrition/Hydration-ADULT  Goal: Nutrient/Hydration intake appropriate for improving, restoring or maintaining nutritional needs  Description: Monitor and assess patient's nutrition/hydration status for malnutrition. Collaborate with interdisciplinary team and initiate plan and interventions as ordered. Monitor patient's weight and dietary intake as ordered or per policy. Utilize nutrition screening tool and intervene as necessary. Determine patient's food preferences and provide high-protein, high-caloric foods as appropriate.      INTERVENTIONS:  - Monitor oral intake, urinary output, labs, and treatment plans  - Assess nutrition and hydration status and recommend course of action  - Evaluate amount of meals eaten  - Do not leave patient alone while eating due to aspiration risk  - Assist patient with eating if necessary   - Allow adequate time for meals  - Recommend/ encourage appropriate diets, oral nutritional supplements, and vitamin/mineral supplements  - Order, calculate, and assess calorie counts as needed  - Assess need for intravenous fluids  - Provide specific nutrition/hydration education as appropriate  - Include patient in decisions related to nutrition  Outcome: Progressing     Problem: RESPIRATORY - ADULT  Goal: Achieves optimal ventilation and oxygenation  Description: INTERVENTIONS:  - Assess for changes in respiratory status  - Assess for changes in mentation and behavior  - Position to facilitate oxygenation and minimize respiratory effort  - Oxygen administered by appropriate delivery if ordered   - Encourage broncho-pulmonary hygiene including cough, deep breathe, Incentive Spirometry, flutter valve  - Assess the need for suctioning and aspirate as needed  - Assess and instruct to report SOB or any respiratory difficulty  - Respiratory Therapy support as indicated  Outcome: Progressing     Problem: GENITOURINARY - ADULT  Goal: Maintains or returns to baseline urinary function  Description: INTERVENTIONS:  - Assess urinary function  - Encourage oral fluids to ensure adequate hydration if ordered  - Administer IV fluids as ordered to ensure adequate hydration  - Administer ordered medications as needed  - Offer frequent toileting  - Follow urinary retention protocol if ordered  Outcome: Progressing  Goal: Urinary catheter remains patent  Description: INTERVENTIONS:  - Assess patency of urinary catheter  - Follow guidelines for intermittent irrigation of non-functioning urinary catheter  Outcome: Progressing     Problem: NEUROSENSORY - ADULT  Goal: Achieves stable or improved neurological status  Description: INTERVENTIONS  - Monitor and report changes in neurological status  - Monitor vital signs such as temperature, blood pressure, glucose, and any other labs ordered   - Initiate measures to prevent increased intracranial pressure  - Monitor for seizure activity and implement precautions if appropriate      Outcome: Progressing  Goal: Achieves maximal functionality and self care  Description: INTERVENTIONS  - Monitor swallowing and airway patency with patient fatigue and changes in neurological status  - Encourage and assist patient to increase activity and self care.    - Encourage visually impaired, hearing impaired and aphasic patients to use assistive/communication devices  Outcome: Progressing     Problem: GASTROINTESTINAL - ADULT  Goal: Maintains adequate nutritional intake  Description: INTERVENTIONS:  - Monitor percentage of each meal consumed  - Identify factors contributing to decreased intake, treat as appropriate  - Assist with meals as needed  - Monitor I&O, weight, and lab values if indicated  - Obtain nutrition services referral as needed  Outcome: Progressing  Goal: Establish and maintain optimal ostomy function  Description: INTERVENTIONS:  - Assess bowel function  - Encourage oral fluids to ensure adequate hydration  - Administer IV fluids if ordered to ensure adequate hydration   - Administer ordered medications as needed  - Encourage mobilization and activity  - Nutrition services referral to assist patient with appropriate food choices  - Assess stoma site  - Consider wound care consult   Outcome: Progressing     Problem: METABOLIC, FLUID AND ELECTROLYTES - ADULT  Goal: Electrolytes maintained within normal limits  Description: INTERVENTIONS:  - Monitor labs and assess patient for signs and symptoms of electrolyte imbalances  - Administer electrolyte replacement as ordered  - Monitor response to electrolyte replacements, including repeat lab results as appropriate  - Instruct patient on fluid and nutrition as appropriate  Outcome: Progressing  Goal: Fluid balance maintained  Description: INTERVENTIONS:  - Monitor labs   - Monitor I/O and WT  - Instruct patient on fluid and nutrition as appropriate  - Assess for signs & symptoms of volume excess or deficit  Outcome: Progressing  Goal: Glucose maintained within target range  Description: INTERVENTIONS:  - Monitor Blood Glucose as ordered  - Assess for signs and symptoms of hyperglycemia and hypoglycemia  - Administer ordered medications to maintain glucose within target range  - Assess nutritional intake and initiate nutrition service referral as needed  Outcome: Progressing     Problem: HEMATOLOGIC - ADULT  Goal: Maintains hematologic stability  Description: INTERVENTIONS  - Assess for signs and symptoms of bleeding or hemorrhage  - Monitor labs  - Administer supportive blood products/factors as ordered and appropriate  Outcome: Progressing

## 2023-09-02 NOTE — ASSESSMENT & PLAN NOTE
Recent Labs     08/31/23  1934 09/01/23  0522 09/02/23  0554   HGB 8.2* 8.4* 8.8*     8/31 Hgb noted to be 6.4.  S/p 1 unit of PRBCs   Today hemoglobin is 8.8  No signs or symptoms of GI bleed, however there is new bruising over her knees bilaterally, and upper extremities  DIC labs: LDH slightly elevated to 275, haptoglobin normal   CT C/A/P no signs of internal bleeding  DVT doppler LE  no DVT

## 2023-09-02 NOTE — ASSESSMENT & PLAN NOTE
Most likely multifactorial, multiple comorbidities requiring infusions, cardiogenic shock with hypotension and kidney hypoperfusion, poor nutritional status and low albumin      Plan:  · Strict I's and O's  · Daily weights  · Lasix prn - received 40mg x 1 9/2

## 2023-09-02 NOTE — ASSESSMENT & PLAN NOTE
Wt Readings from Last 3 Encounters:   09/02/23 70.4 kg (155 lb 3.3 oz)   07/29/23 49.4 kg (108 lb 14.4 oz)   06/30/23 55.3 kg (122 lb)   8/27 CXR showed pulmonary congestion  Positive fluid balance since admission might be affected by undocumented urine   On PE euvolemic, soft tissue swelling, anasarca on imaging, + 17 l balance  8/29 S/p cardiac arrest with ROSC   Repeat echo EF 15-20 %   Beta blocker on hold due to hypotension.  Lasix was given with poor response  Discontinue Albumin 25% 25g q6h  8/30 Started on milrinone, nor epi, NE    9/1 DC NE, started on vasopressin, DC NE, c/w milrinone,  additional lasix given with good response  Prn diuresis for goal negative fluid balance

## 2023-09-02 NOTE — ASSESSMENT & PLAN NOTE
· Patient was brought to the ED after being in altered mental status, requiring more oxygen. · History of multiple episodes of vomiting at night prior to admission  · Initially admitted with aspiration pneumonia, treated with a course of antibiotics and improved  · Remained on oxygen therapy, but developed a change in MS and required transfer back to the ICU  · She was noted to have an increase WOB and was placed on BIPAP  · This current episode does not appear to be related to an acute lung issue but more likely related to acute hepatic failure  · Patient is status postcardiac arrest, was intubated   · CT H - no change from prior      Plan:  · Vent : 16/400/6/50%   · Goal SpO2 > 92%  · Not appropriate for extubation at this time.

## 2023-09-02 NOTE — OCCUPATIONAL THERAPY NOTE
Occupational Therapy Cancel Note:    Patient Name: Ferdinand Cobian  [id number] Date: 9/2/2023    Chart reviewed. Pt remains intubated at this time. Per chart- pt not medically appropriate for weaning trial at this time. Will D/C OT orders. Please re-consult as medically appropriate.     Erwin Franco, OTR/L

## 2023-09-02 NOTE — ASSESSMENT & PLAN NOTE
· Most likely contributing to her respiratory status initially  · peg tube placed 8/17  · Tube feeds restarted on 9/1

## 2023-09-02 NOTE — ASSESSMENT & PLAN NOTE
Patient is a 62-year-old female  who was status postcardiac arrest with ROSC,   · It appeared patient became bradycardic and agonal respiration, patient arrested and CPR and ACLS protocol -CPR was started received epinephrine x 3, mag sulfate, bicarb x 2 , fibrillation, serum chloride dextrose presents from 927 to 939  · Patient now is a DNR level 2   · Milrinone and vasopressin held 9/2

## 2023-09-02 NOTE — ASSESSMENT & PLAN NOTE
· Platelets 38 today  · No obvious source of bleeding, patient did not receive heparin  · CT abdomen pelvis showed no retroperitoneal hematoma  · DIC panel was done on 80/31s,: fibrin products more than 10, plasminogen low 20, Antithrombin low 19, APTT 40, INR 3.57, D-dimer elevated 8.49, fibrinogen low 169, platelets 41  · Juan panel was equivocal, after globin normal, LDH borderline elevated  275  Plan:  · Monitor for active bleeding  · Repeat DIC panel  · Repeat LDH  · Monitor platelets, transfuse as needed

## 2023-09-02 NOTE — ASSESSMENT & PLAN NOTE
Recent Labs     08/31/23  1934 09/01/23  0522 09/02/23  0554   HGB 8.2* 8.4* 8.8*     8/31 Hgb noted to be 6.4.  S/p 1 unit of PRBCs   Most recent hgb: 8.8  No signs or symptoms of GI bleed, however there is new bruising over her knees bilaterally, and upper extremities  DIC labs: LDH slightly elevated to 275, haptoglobin normal  CT C/A/P no signs of internal bleeding  DVT doppler LE no DVT

## 2023-09-02 NOTE — ASSESSMENT & PLAN NOTE
Patient is a 50-year-old female  who was status postcardiac arrest with ROSC,   · It appeared patient became bradycardic and agonal respiration, patient arrested and CPR and ACLS protocol -CPR was started received epinephrine x 3, mag sulfate, bicarb x 2 , fibrillation, serum chloride dextrose presents from 927 to 939  · Patient now is a DNR level 2   · Currently on Milrinone 0.13 mcg/kg/min and Vasopressin 0.04units/min  · Consider increasing milrinone dose.

## 2023-09-02 NOTE — ASSESSMENT & PLAN NOTE
Most likely multifactorial, multiple comorbidities requiring infusions, cardiogenic shock with hypotension and kidney hypoperfusion, poor nutritional status and low albumin  Positive balance 1700 cc since admission  Status post Lasix yesterday with good response urine output 2100  Plan:  · Strict I's and O's  · Daily weights  · We will consider additional Lasix

## 2023-09-03 PROBLEM — I48.91 A-FIB (HCC): Status: ACTIVE | Noted: 2023-01-01

## 2023-09-03 NOTE — PLAN OF CARE
Problem: RESPIRATORY - ADULT  Goal: Achieves optimal ventilation and oxygenation  Description: INTERVENTIONS:  - Assess for changes in respiratory status  - Assess for changes in mentation and behavior  - Position to facilitate oxygenation and minimize respiratory effort  - Oxygen administered by appropriate delivery if ordered   - Encourage broncho-pulmonary hygiene including cough, deep breathe, Incentive Spirometry, flutter valve  - Assess the need for suctioning and aspirate as needed  - Assess and instruct to report SOB or any respiratory difficulty  - Respiratory Therapy support as indicated  Outcome: Progressing     Problem: GENITOURINARY - ADULT  Goal: Maintains or returns to baseline urinary function  Description: INTERVENTIONS:  - Assess urinary function  - Encourage oral fluids to ensure adequate hydration if ordered  - Administer IV fluids as ordered to ensure adequate hydration  - Administer ordered medications as needed  - Offer frequent toileting  - Follow urinary retention protocol if ordered  Outcome: Progressing     Problem: NEUROSENSORY - ADULT  Goal: Achieves stable or improved neurological status  Description: INTERVENTIONS  - Monitor and report changes in neurological status  - Monitor vital signs such as temperature, blood pressure, glucose, and any other labs ordered   - Initiate measures to prevent increased intracranial pressure  - Monitor for seizure activity and implement precautions if appropriate      Outcome: Progressing     Problem: METABOLIC, FLUID AND ELECTROLYTES - ADULT  Goal: Electrolytes maintained within normal limits  Description: INTERVENTIONS:  - Monitor labs and assess patient for signs and symptoms of electrolyte imbalances  - Administer electrolyte replacement as ordered  - Monitor response to electrolyte replacements, including repeat lab results as appropriate  - Instruct patient on fluid and nutrition as appropriate  Outcome: Progressing

## 2023-09-03 NOTE — PLAN OF CARE
Problem: Potential for Falls  Goal: Patient will remain free of falls  Description: INTERVENTIONS:  - Educate patient/family on patient safety including physical limitations  - Instruct patient to call for assistance with activity   - Consult OT/PT to assist with strengthening/mobility   - Keep Call bell within reach  - Keep bed low and locked with side rails adjusted as appropriate  - Keep care items and personal belongings within reach  - Initiate and maintain comfort rounds  - Make Fall Risk Sign visible to staff  - Offer Toileting every 2 Hours, in advance of need  - Initiate/Maintain bed/chair alarm  - Obtain necessary fall risk management equipment: bed/chair alarm, call bell, gripper socks, walker, bedside commode  - Apply yellow socks and bracelet for high fall risk patients  - Consider moving patient to room near nurses station  Outcome: Progressing     Problem: MOBILITY - ADULT  Goal: Maintain or return to baseline ADL function  Description: INTERVENTIONS:  - Educate patient/family on patient safety including physical limitations  - Instruct patient to call for assistance with activity   - Consult OT/PT to assist with strengthening/mobility   - Keep Call bell within reach  - Keep bed low and locked with side rails adjusted as appropriate  - Keep care items and personal belongings within reach  - Initiate and maintain comfort rounds  - Make Fall Risk Sign visible to staff  - Offer Toileting every 2 Hours, in advance of need  - Initiate/Maintain bed/chair alarm  - Obtain necessary fall risk management equipment: bed/chair alarm, call bell, gripper socks, walker, bedside commode  - Apply yellow socks and bracelet for high fall risk patients  - Consider moving patient to room near nurses station  Outcome: Not Progressing  Goal: Maintains/Returns to pre admission functional level  Description: INTERVENTIONS:  - Perform BMAT or MOVE assessment daily.   - Set and communicate daily mobility goal to care team and patient. - Collaborate with rehabilitation services on mobility goals if consulted  - Reposition patient every 2 hours.   - Dangle patient 3 times a day  - Stand patient 3 times a day  - Ambulate patient 3 times a day  - Out of bed to chair 3 times a day   - Out of bed for meals  - Out of bed for toileting  - Record patient progress and toleration of activity level   Outcome: Not Progressing     Problem: PAIN - ADULT  Goal: Verbalizes/displays adequate comfort level or baseline comfort level  Description: Interventions:  - Encourage patient to monitor pain and request assistance  - Assess pain using appropriate pain scale  - Administer analgesics based on type and severity of pain and evaluate response  - Implement non-pharmacological measures as appropriate and evaluate response  - Consider cultural and social influences on pain and pain management  - Notify physician/advanced practitioner if interventions unsuccessful or patient reports new pain  Outcome: Progressing     Problem: INFECTION - ADULT  Goal: Absence or prevention of progression during hospitalization  Description: INTERVENTIONS:  - Assess and monitor for signs and symptoms of infection  - Monitor lab/diagnostic results  - Monitor all insertion sites, i.e. indwelling lines, tubes, and drains  - Monitor endotracheal if appropriate and nasal secretions for changes in amount and color  - [place] appropriate cooling/warming therapies per order  - Administer medications as ordered  - Instruct and encourage patient and family to use good hand hygiene technique  - Identify and instruct in appropriate isolation precautions (contact isolation for CRE) for identified infection/condition  Outcome: Progressing     Problem: SAFETY ADULT  Goal: Patient will remain free of falls  Description: INTERVENTIONS:  - Educate patient/family on patient safety including physical limitations  - Instruct patient to call for assistance with activity   - Consult OT/PT to assist with strengthening/mobility   - Keep Call bell within reach  - Keep bed low and locked with side rails adjusted as appropriate  - Keep care items and personal belongings within reach  - Initiate and maintain comfort rounds  - Make Fall Risk Sign visible to staff  - Offer Toileting every 2 Hours, in advance of need  - Initiate/Maintain bed/chair alarm  - Obtain necessary fall risk management equipment: bed/chair alarm, call bell, gripper socks, walker, bedside commode  - Apply yellow socks and bracelet for high fall risk patients  - Consider moving patient to room near nurses station  Outcome: Progressing  Goal: Maintain or return to baseline ADL function  Description: INTERVENTIONS:  - Educate patient/family on patient safety including physical limitations  - Instruct patient to call for assistance with activity   - Consult OT/PT to assist with strengthening/mobility   - Keep Call bell within reach  - Keep bed low and locked with side rails adjusted as appropriate  - Keep care items and personal belongings within reach  - Initiate and maintain comfort rounds  - Make Fall Risk Sign visible to staff  - Offer Toileting every 2 Hours, in advance of need  - Initiate/Maintain bed/chair alarm  - Obtain necessary fall risk management equipment: bed/chair alarm, call bell, gripper socks, walker, bedside commode  - Apply yellow socks and bracelet for high fall risk patients  - Consider moving patient to room near nurses station  Outcome: Not Progressing  Goal: Maintains/Returns to pre admission functional level  Description: INTERVENTIONS:  - Perform BMAT or MOVE assessment daily.   - Set and communicate daily mobility goal to care team and patient. - Collaborate with rehabilitation services on mobility goals if consulted  - Reposition patient every 2 hours.   - Dangle patient 3 times a day  - Stand patient 3 times a day  - Ambulate patient 3 times a day  - Out of bed to chair 3 times a day   - Out of bed for meals  - Out of bed for toileting  - Record patient progress and toleration of activity level   Outcome: Not Progressing     Problem: DISCHARGE PLANNING  Goal: Discharge to home or other facility with appropriate resources  Description: INTERVENTIONS:  - Identify barriers to discharge w/patient  - Arrange for needed discharge resources and transportation as appropriate  - Identify discharge learning needs   - Refer to Case Management Department for coordinating discharge planning if the patient needs post-hospital services based on physician/advanced practitioner order or complex needs related to functional status, cognitive ability, or social support system  Outcome: Not Progressing     Problem: Knowledge Deficit  Goal: Patient/family/caregiver demonstrates understanding of disease process, treatment plan, medications, and discharge instructions  Description: Complete learning assessment and assess knowledge base.   Interventions:  - Provide teaching at level of understanding  - Provide teaching via preferred learning methods  Outcome: Not Progressing     Problem: Prexisting or High Potential for Compromised Skin Integrity  Goal: Skin integrity is maintained or improved  Description: INTERVENTIONS:  - Identify patients at risk for skin breakdown  - Assess and monitor skin integrity  - Assess and monitor nutrition and hydration status  - Monitor labs   - Assess for incontinence   - Turn and reposition patient  - Assist with mobility/ambulation  - Relieve pressure over bony prominences  - Avoid friction and shearing  - Provide appropriate hygiene as needed including keeping skin clean and dry  - Evaluate need for skin moisturizer/barrier cream  - Collaborate with interdisciplinary team   - Patient/family teaching  - Consider wound care consult   Outcome: Progressing     Problem: Nutrition/Hydration-ADULT  Goal: Nutrient/Hydration intake appropriate for improving, restoring or maintaining nutritional needs  Description: Monitor and assess patient's nutrition/hydration status for malnutrition. Collaborate with interdisciplinary team and initiate plan and interventions as ordered. Monitor patient's weight and dietary intake as ordered or per policy. Utilize nutrition screening tool and intervene as necessary. Determine patient's food preferences and provide high-protein, high-caloric foods as appropriate.      INTERVENTIONS:  - Monitor oral intake, urinary output, labs, and treatment plans  - Assess nutrition and hydration status and recommend course of action  - Evaluate amount of meals eaten  - Do not leave patient alone while eating due to aspiration risk  - Assist patient with eating if necessary   - Allow adequate time for meals  - Recommend/ encourage appropriate diets, oral nutritional supplements, and vitamin/mineral supplements  - Order, calculate, and assess calorie counts as needed  - Assess need for intravenous fluids  - Provide specific nutrition/hydration education as appropriate  - Include patient in decisions related to nutrition  Outcome: Progressing     Problem: GENITOURINARY - ADULT  Goal: Maintains or returns to baseline urinary function  Description: INTERVENTIONS:  - Assess urinary function  - Encourage oral fluids to ensure adequate hydration if ordered  - Administer IV fluids as ordered to ensure adequate hydration  - Administer ordered medications as needed  - Offer frequent toileting  - Follow urinary retention protocol if ordered  Outcome: Progressing     Problem: NEUROSENSORY - ADULT  Goal: Achieves stable or improved neurological status  Description: INTERVENTIONS  - Monitor and report changes in neurological status  - Monitor vital signs such as temperature, blood pressure, glucose, and any other labs ordered   - Initiate measures to prevent increased intracranial pressure  - Monitor for seizure activity and implement precautions if appropriate      Outcome: Not Progressing  Goal: Achieves maximal functionality and self care  Description: INTERVENTIONS  - Monitor swallowing and airway patency with patient fatigue and changes in neurological status  - Encourage and assist patient to increase activity and self care.    - Encourage visually impaired, hearing impaired and aphasic patients to use assistive/communication devices  Outcome: Not Progressing     Problem: GASTROINTESTINAL - ADULT  Goal: Maintains adequate nutritional intake  Description: INTERVENTIONS:  - Monitor percentage of each meal consumed  - Identify factors contributing to decreased intake, treat as appropriate  - Assist with meals as needed  - Monitor I&O, weight, and lab values if indicated  - Obtain nutrition services referral as needed  Outcome: Progressing  Goal: Establish and maintain optimal ostomy function  Description: INTERVENTIONS:  - Assess bowel function  - Encourage oral fluids to ensure adequate hydration  - Administer IV fluids if ordered to ensure adequate hydration   - Administer ordered medications as needed  - Encourage mobilization and activity  - Nutrition services referral to assist patient with appropriate food choices  - Assess stoma site  - Consider wound care consult   Outcome: Progressing     Problem: METABOLIC, FLUID AND ELECTROLYTES - ADULT  Goal: Electrolytes maintained within normal limits  Description: INTERVENTIONS:  - Monitor labs and assess patient for signs and symptoms of electrolyte imbalances  - Administer electrolyte replacement as ordered  - Monitor response to electrolyte replacements, including repeat lab results as appropriate  - Instruct patient on fluid and nutrition as appropriate  Outcome: Progressing  Goal: Fluid balance maintained  Description: INTERVENTIONS:  - Monitor labs   - Monitor I/O and WT  - Instruct patient on fluid and nutrition as appropriate  - Assess for signs & symptoms of volume excess or deficit  Outcome: Progressing  Goal: Glucose maintained within target range  Description: INTERVENTIONS:  - Monitor Blood Glucose as ordered  - Assess for signs and symptoms of hyperglycemia and hypoglycemia  - Administer ordered medications to maintain glucose within target range  - Assess nutritional intake and initiate nutrition service referral as needed  Outcome: Progressing     Problem: HEMATOLOGIC - ADULT  Goal: Maintains hematologic stability  Description: INTERVENTIONS  - Assess for signs and symptoms of bleeding or hemorrhage  - Monitor labs  - Administer supportive blood products/factors as ordered and appropriate  Outcome: Progressing

## 2023-09-03 NOTE — PROGRESS NOTES
[facility]  Progress Note  Name: Rosina Vora  MRN: [id number]  Unit/Bed#: ICU 04 I Date of Admission: 8/10/2023   Date of Service: 9/3/2023 I Hospital Day: 24    Assessment/Plan   Acute liver failure  Assessment & Plan  · The patient had a slight transaminitis during admission but over the past few days has steadily risen and then doubled on 8/26 with his AST 2800, ALT 1114  · Severe elevation of INR, out of proportion to liver injury  · Multifactorial - She was found to have a slightly elevated tylenol level (that continues to normalize) which may be a contributing factor however, not thought to be the only issue (ischemic vs viral hepatitis)  · She has HF, which may contribute to hepatotoxicity  · Additionally she was on a few drugs (Plaquinel, wellbutrin and lamictal) which can all be associated with liver failure  · Plaquinel and Wellbutrin held  · Doppler studies were normal, no thrombus  · Hepatitis panel on 8.24 was positive for hep C antibodies otherwise negative  · EBV ab/ag positive  · HSV, and VZV panels pending  · Started on acyclovir until HSV can be ruled out  · GI is following- appreciate their assistance with her care  · S/p NAC and fomepizole, dc on  8/28 as per Medical Toxicology  · Supportive care  · Vitamin K supplementation  · She would not be a candidate for a liver transplant  Recent Labs     08/31/23  0421 09/01/23  0522 09/02/23  0554   AST 91* 56* 38   * 128* 97*   ALKPHOS 50 56 60   TBILI 3.60* 3.77* 3.31*   BILIDIR  --   --  1.63*     · LFTs now improving. Likely in the setting of shock liver vs acetaminophen toxicity. * Acute respiratory failure with hypoxia [facility])  Assessment & Plan  · Patient was brought to the ED after being in altered mental status, requiring more oxygen.    · History of multiple episodes of vomiting at night prior to admission  · Initially admitted with aspiration pneumonia, treated with a course of antibiotics and improved  · Remained on oxygen therapy, but developed a change in MS and required transfer back to the ICU  · She was noted to have an increase WOB and was placed on BIPAP  · This current episode does not appear to be related to an acute lung issue but more likely related to acute hepatic failure  · Patient is status postcardiac arrest, was intubated   · CT H - no change from prior      Plan:  · Vent : 16/400/6/50%   · Goal SpO2 > 92%  · Not appropriate for extubation at this time. Dysphagia  Assessment & Plan  · Most likely contributing to her respiratory status initially  · peg tube placed 8/17  · Continue tube feeds      HFrEF (heart failure with reduced ejection fraction) ([facility])  Assessment & Plan  Wt Readings from Last 3 Encounters:   09/02/23 70.4 kg (155 lb 3.3 oz)   07/29/23 49.4 kg (108 lb 14.4 oz)   06/30/23 55.3 kg (122 lb)   8/27 CXR showed pulmonary congestion  Positive fluid balance since admission might be affected by undocumented urine   On PE euvolemic, soft tissue swelling, anasarca on imaging, + 17 l balance  8/29 S/p cardiac arrest with ROSC   Repeat echo EF 15-20 %   Beta blocker on hold due to hypotension.  Lasix was given with poor response  Discontinue Albumin 25% 25g q6h  8/30 Started on milrinone, nor epi, NE    9/1 DC NE, started on vasopressin, DC NE, c/w milrinone,  additional lasix given with good response  Prn diuresis for goal negative fluid balance    Altered mental status  Assessment & Plan  · Noted to be more altered on 8/26 with eye opening to name but not verbal, not following commands  · 8/27 Mental status wavering this morning  · Ammonia level was normal blood sugar was normal head cT without acute abnormality  · Liver enzymes elevated, no asterixis on exam    Plan:  · Continue monitoring fingerstick blood glucose every 6 hours- high risk for hypoglycemia  · Hypoglycemia protocol  · SSI  · Frequent neurochecks    A-fib ([address])  Assessment & Plan  · New onset episode 9/2, received digoxin 500mcg x 1  · Iodine allergy, unable to use amiodarone  · Replete electrolytes as needed for goal Mag > 2.0, Phos >3.0, K >4.0  · Telemetry monitoring  · Unable to anticoagulate 2/2 thrombocytopenia      Multiple wounds  Assessment & Plan  Multiple wounds and skin lesions back, lower and upper extremities  Bluish discoloration of the toes bilaterally  Multiple blisters on the bilateral feet  · Proceed with wound care recommendations    Volume overload  Assessment & Plan  Most likely multifactorial, multiple comorbidities requiring infusions, cardiogenic shock with hypotension and kidney hypoperfusion, poor nutritional status and low albumin      Plan:  · Strict I's and O's  · Daily weights  · Lasix prn - received 40mg x 1 9/2    Thrombocytopenia (HCC)  Assessment & Plan  · Platelets 38 today  · No obvious source of bleeding, patient did not receive heparin  · CT abdomen pelvis showed no retroperitoneal hematoma  · DIC panel was done on 80/31s,: fibrin products more than 10, plasminogen low 20, Antithrombin low 19, APTT 40, INR 3.57, D-dimer elevated 8.49, fibrinogen low 169, platelets 41  · Juan panel was equivocal, after globin normal, LDH borderline elevated  275  Plan:  · Monitor for active bleeding  · Repeat DIC panel  · Repeat LDH  · Monitor platelets, transfuse as needed  · Hold DVT ppx    Cardiac arrest [facility])  Assessment & Plan  Patient is a 77-year-old female  who was status postcardiac arrest with ROSC,   · It appeared patient became bradycardic and agonal respiration, patient arrested and CPR and ACLS protocol -CPR was started received epinephrine x 3, mag sulfate, bicarb x 2 , fibrillation, serum chloride dextrose presents from 927 to 939  · Patient now is a DNR level 2   · Milrinone and vasopressin held 9/2      Hypoglycemia  Assessment & Plan  · Current hypoglycemia may be related to her liver failure  · S/p D10  · No hypoglycemic episodes noted today.     Plan:  Q6 finger sticks Closed T12 fracture [facility])  Assessment & Plan  Patient has a history of low impact fall that resulted in multiple T-spine fractures  Currently pain controlled by morphine    Plan:  · Hold gabapentin  · She is on narcotics chronically, but has been hospitalized for about 2 weeks with variable use  · PRN fentanyl       History of stroke  Assessment & Plan  · Patient had right MCA stroke, s/p mechanical thrombectomy earlier this year  · Although the patient moves all the limbs, there is residual weakness in the left upper and lower limb  · CT head on August 8 shows No acute intracranial abnormality    Plan:  · Hold Eliquis for now given elevated INR in the setting of her liver failure  · No DVT ppx in setting of thrombocytopenia  · Frequent neurochecks  · CTH remains stable       Hypercalcemia  Assessment & Plan  · 2/2 hyperparathyroidism  · follows with endocrine  · sensipar on hold 2/2 unable to crush    LAILA (acute kidney injury) [facility])  Assessment & Plan  Recent Labs     08/31/23  0421 09/01/23  0522 09/02/23  0554   CREATININE 1.52* 1.33* 1.22   EGFR 34 40 45     Estimated Creatinine Clearance: 41.9 mL/min (by C-G formula based on SCr of 1.22 mg/dL). Suspect this is likely in the setting of poor perfusion and cardiorenal syndrome. Plan:  Avoid nephrotoxic agents. Continue monitoring renal indices. 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 Lower dose started 2/2 sedation and uptitrate as able.   · Lamictal is also associated with hepatic toxicity but given her seizure risk, we will continue this for now      Anemia  Assessment & Plan  Recent Labs     08/31/23  1934 09/01/23  0522 09/02/23  0554   HGB 8.2* 8.4* 8.8*     8/31 Hgb noted to be 6.4. S/p 1 unit of PRBCs   Most recent hgb: 8.8  No signs or symptoms of GI bleed, however there is new bruising over her knees bilaterally, and upper extremities  DIC labs: LDH slightly elevated to 275, haptoglobin normal  CT C/A/P no signs of internal bleeding  DVT doppler LE no DVT           ICU Core Measures     Vented Patient  VAP Bundle  VAP Bundle: Ordered     A: Assess, Prevent, and Manage Pain · Has pain been assessed? Yes  · Need for changes to pain regimen? No   B: Both Spontaneous Awakening Trials (SATs) and Spontaneous Breathing Trials (SBTs) · Plan to perform spontaneous awakening trial today? Yes   · Plan to perform spontaneous breathing trial today? Yes   · Obvious barriers to extubation? Yes   C: Choice of Sedation · RASS Goal: 0 Alert and Calm  · Need for changes to sedation or analgesia regimen? No   D: Delirium · CAM-ICU: Unable to perform secondary to Acute cognitive dysfunction   E: Early Mobility  · Plan for early mobility? Yes   F: Family Engagement · Plan for family engagement today? Yes       Review of Invasive Devices: Aftab Plan: Continue for accurate I/O monitoring for 48 hours  Central access plan: Medications requiring central line  Verito Plan: Keep arterial line for hemodynamic monitoring    Prophylaxis:  VTE    Stress Ulcer  covered by omeprazole (PRILOSEC) suspension 2 mg/mL [[id number]]       Subjective   HPI/24hr events: New onset rapid AFib yesterday, received digoxin 500mcg followed by 250mcg. Electrolytes repleted overnight with improvement in HR.      Review of Systems   Unable to perform ROS: Intubated             Objective                            Vitals I/O      Most Recent Min/Max in 24hrs   Temp 100.4 °F (38 °C) Temp  Min: 98.2 °F (36.8 °C)  Max: 100.4 °F (38 °C)   Pulse 88 Pulse  Min: 88  Max: 140   Resp 22 Resp  Min: 15  Max: 31   /77 BP  Min: 129/77  Max: 129/77   O2 Sat 99 % SpO2  Min: 95 %  Max: 100 %      Intake/Output Summary (Last 24 hours) at 9/3/2023 0358  Last data filed at 9/3/2023 0125  Gross per 24 hour   Intake 2142.64 ml   Output 3135 ml   Net -992.36 ml         Diet Enteral/Parenteral; Tube Feeding No Oral Diet; Jevity 1.2 Prabhjot; Continuous; 50; 100; Every 4 hours     Invasive Monitoring Physical exam   Arterial Line  Verito /54  Arterial Line BP  Min: 106/50  Max: 154/80   MAP 80 mmHg  Arterial Line MAP (mmHg)  Min: 72 mmHg  Max: 108 mmHg    Physical Exam  Eyes:      Pupils: Pupils are equal, round, and reactive to light. Skin:     Comments: Multiple blisters of b/l LE   HENT:      Head: Normocephalic and atraumatic. Cardiovascular:      Rate and Rhythm: Normal rate and regular rhythm. Heart sounds: Heart sounds not distant. No murmur heard. No friction rub. No gallop. Musculoskeletal:      Right lower leg: No edema. Left lower leg: No edema. Comments: Discoloration of toes   Abdominal:      General: There is no distension. Palpations: Abdomen is soft. Comments: PEG tube   Constitutional:       General: She is not in acute distress. Appearance: She is cachectic. She is ill-appearing. Interventions: She is intubated and restrained. Pulmonary:      Effort: She is intubated. Breath sounds: Decreased breath sounds present. Neurological:      Comments: Opens eyes to verbal stimuli, does not follow commands   Vitals and nursing note reviewed.             Diagnostic Studies        Imaging:  I have personally reviewed pertinent films in PACS     Medications:  Scheduled PRN   bisacodyl, 10 mg, Daily  chlorhexidine, 15 mL, Q12H KHARI  DULoxetine, 60 mg, BID  furosemide, 40 mg, Daily  gabapentin, 100 mg, HS  glycerin-hypromellose-, 1 drop, TID  insulin lispro, 1-5 Units, Q6H KHARI  lamoTRIgine, 50 mg, HS  lidocaine, 1 patch, Daily  omeprazole (PRILOSEC) suspension 2 mg/mL, 20 mg, Daily  oxyCODONE, 5 mg, Q8H  polyethylene glycol, 17 g, Daily      fentanyl citrate (PF), 50 mcg, Q2H PRN       Continuous    milrinone (Primacor) infusion, 0.13 mcg/kg/min, Last Rate: Stopped (09/02/23 1300)  vasopressin, 0.04 Units/min, Last Rate: Stopped (09/02/23 1941)         Labs:    CBC    Recent Labs     09/01/23 0522 09/02/23  0554 09/02/23  0734   WBC 9.88 8.15  --    HGB 8.4* 8.8*  --    HCT 27.0* 28.1*  --    PLT 57* 38* 36*     BMP    Recent Labs     09/02/23  0554 09/02/23  1909   SODIUM 147 146   K 3.6 3.1*   * 109*   CO2 29 32   AGAP 6 5   BUN 53* 51*   CREATININE 1.22 1.10   CALCIUM 10.2 9.8       Coags    Recent Labs     09/02/23  0734 09/02/23  1150   INR 1.69* 1.74*   PTT 29  --         Additional Electrolytes  Recent Labs     09/01/23  0522 09/02/23  1909   MG 2.0 1.8*   PHOS 3.4 2.5          Blood Gas    Recent Labs     09/01/23  0557   PHART 7.499*   [id number] 34.9*   PO2ART 58.9*   [id number] 26.5   BEART 3.3   SOURCE Line, Arterial     Recent Labs     09/01/23  0557   SOURCE Line, Arterial    LFTs  Recent Labs     09/01/23  0522 09/02/23  0554   * 97*   AST 56* 38   ALKPHOS 56 60   ALB 3.1* 3.0*   TBILI 3.77* 3.31*       Infectious  No recent results  Glucose  Recent Labs     09/01/23  0522 09/02/23  0554 09/02/23  1909   GLUC 96 154* [address] [place], [place]

## 2023-09-03 NOTE — ASSESSMENT & PLAN NOTE
· New onset episode 9/2, received digoxin 500mcg x 1  · Iodine allergy, unable to use amiodarone  · Replete electrolytes as needed for goal Mag > 2.0, Phos >3.0, K >4.0  · Telemetry monitoring  · Unable to anticoagulate 2/2 thrombocytopenia

## 2023-09-03 NOTE — PLAN OF CARE
Problem: Potential for Falls  Goal: Patient will remain free of falls  Description: INTERVENTIONS:  - Educate patient/family on patient safety including physical limitations  - Instruct patient to call for assistance with activity   - Consult OT/PT to assist with strengthening/mobility   - Keep Call bell within reach  - Keep bed low and locked with side rails adjusted as appropriate  - Keep care items and personal belongings within reach  - Initiate and maintain comfort rounds  - Make Fall Risk Sign visible to staff  - Offer Toileting every 2 Hours, in advance of need  - Initiate/Maintain bed/chair alarm  - Obtain necessary fall risk management equipment: bed/chair alarm, call bell, gripper socks, walker, bedside commode  - Apply yellow socks and bracelet for high fall risk patients  - Consider moving patient to room near nurses station  Outcome: Progressing     Problem: MOBILITY - ADULT  Goal: Maintain or return to baseline ADL function  Description: INTERVENTIONS:  - Educate patient/family on patient safety including physical limitations  - Instruct patient to call for assistance with activity   - Consult OT/PT to assist with strengthening/mobility   - Keep Call bell within reach  - Keep bed low and locked with side rails adjusted as appropriate  - Keep care items and personal belongings within reach  - Initiate and maintain comfort rounds  - Make Fall Risk Sign visible to staff  - Offer Toileting every 2 Hours, in advance of need  - Initiate/Maintain bed/chair alarm  - Obtain necessary fall risk management equipment: bed/chair alarm, call bell, gripper socks, walker, bedside commode  - Apply yellow socks and bracelet for high fall risk patients  - Consider moving patient to room near nurses station  Outcome: Progressing  Goal: Maintains/Returns to pre admission functional level  Description: INTERVENTIONS:  - Perform BMAT or MOVE assessment daily.   - Set and communicate daily mobility goal to care team and patient. - Collaborate with rehabilitation services on mobility goals if consulted  - Reposition patient every 2 hours.   - Dangle patient 3 times a day  - Stand patient 3 times a day  - Ambulate patient 3 times a day  - Out of bed to chair 3 times a day   - Out of bed for meals  - Out of bed for toileting  - Record patient progress and toleration of activity level   Outcome: Progressing     Problem: INFECTION - ADULT  Goal: Absence or prevention of progression during hospitalization  Description: INTERVENTIONS:  - Assess and monitor for signs and symptoms of infection  - Monitor lab/diagnostic results  - Monitor all insertion sites, i.e. indwelling lines, tubes, and drains  - Monitor endotracheal if appropriate and nasal secretions for changes in amount and color  - [place] appropriate cooling/warming therapies per order  - Administer medications as ordered  - Instruct and encourage patient and family to use good hand hygiene technique  - Identify and instruct in appropriate isolation precautions (contact isolation for CRE) for identified infection/condition  Outcome: Progressing     Problem: SAFETY ADULT  Goal: Patient will remain free of falls  Description: INTERVENTIONS:  - Educate patient/family on patient safety including physical limitations  - Instruct patient to call for assistance with activity   - Consult OT/PT to assist with strengthening/mobility   - Keep Call bell within reach  - Keep bed low and locked with side rails adjusted as appropriate  - Keep care items and personal belongings within reach  - Initiate and maintain comfort rounds  - Make Fall Risk Sign visible to staff  - Offer Toileting every 2 Hours, in advance of need  - Initiate/Maintain bed/chair alarm  - Obtain necessary fall risk management equipment: bed/chair alarm, call bell, gripper socks, walker, bedside commode  - Apply yellow socks and bracelet for high fall risk patients  - Consider moving patient to room near nurses station  Outcome: Progressing  Goal: Maintain or return to baseline ADL function  Description: INTERVENTIONS:  - Educate patient/family on patient safety including physical limitations  - Instruct patient to call for assistance with activity   - Consult OT/PT to assist with strengthening/mobility   - Keep Call bell within reach  - Keep bed low and locked with side rails adjusted as appropriate  - Keep care items and personal belongings within reach  - Initiate and maintain comfort rounds  - Make Fall Risk Sign visible to staff  - Offer Toileting every 2 Hours, in advance of need  - Initiate/Maintain bed/chair alarm  - Obtain necessary fall risk management equipment: bed/chair alarm, call bell, gripper socks, walker, bedside commode  - Apply yellow socks and bracelet for high fall risk patients  - Consider moving patient to room near nurses station  Outcome: Progressing  Goal: Maintains/Returns to pre admission functional level  Description: INTERVENTIONS:  - Perform BMAT or MOVE assessment daily.   - Set and communicate daily mobility goal to care team and patient. - Collaborate with rehabilitation services on mobility goals if consulted  - Reposition patient every 2 hours.   - Dangle patient 3 times a day  - Stand patient 3 times a day  - Ambulate patient 3 times a day  - Out of bed to chair 3 times a day   - Out of bed for meals  - Out of bed for toileting  - Record patient progress and toleration of activity level   Outcome: Progressing

## 2023-09-04 NOTE — PROGRESS NOTES
Ana Rosa Fraire is a 70 y.o. female who is currently ordered Vancomycin IV with management by the Pharmacy Consult service. Relevant clinical data and objective / subjective history reviewed. Vancomycin Assessment:  Indication and Goal AUC/Trough: Urinary tract infection (goal -600, trough >10); Pneumonia (goal -600, trough >10)  Clinical Status: stable  Micro:     Renal Function:  SCr: 0.81 mg/dL  CrCl: 56.6 mL/min  Renal replacement: Not on dialysis  Days of Therapy: 1  Current Dose: 1,500 mg IV loading dose   Vancomycin Plan:  New Dosin,250 mg IV q24h   Estimated AUC: 439 mcg*hr/mL  Estimated Trough: 12 mcg/mL  Next Level: /7 with AM labs  Renal Function Monitoring: Daily BMP and [place] will continue to follow closely for s/sx of nephrotoxicity, infusion reactions and appropriateness of therapy. BMP and CBC will be ordered per protocol. We will continue to follow the patient’s culture results and clinical progress daily.     Sree Grullon, Pharmacist

## 2023-09-04 NOTE — PLAN OF CARE
Problem: Potential for Falls  Goal: Patient will remain free of falls  Description: INTERVENTIONS:  - Educate patient/family on patient safety including physical limitations  - Instruct patient to call for assistance with activity   - Consult OT/PT to assist with strengthening/mobility   - Keep Call bell within reach  - Keep bed low and locked with side rails adjusted as appropriate  - Keep care items and personal belongings within reach  - Initiate and maintain comfort rounds  - Make Fall Risk Sign visible to staff  - Offer Toileting every 2 Hours, in advance of need  - Initiate/Maintain bed/chair alarm  - Obtain necessary fall risk management equipment: bed/chair alarm, call bell, gripper socks, walker, bedside commode  - Apply yellow socks and bracelet for high fall risk patients  - Consider moving patient to room near nurses station  Outcome: Progressing     Problem: MOBILITY - ADULT  Goal: Maintain or return to baseline ADL function  Description: INTERVENTIONS:  - Educate patient/family on patient safety including physical limitations  - Instruct patient to call for assistance with activity   - Consult OT/PT to assist with strengthening/mobility   - Keep Call bell within reach  - Keep bed low and locked with side rails adjusted as appropriate  - Keep care items and personal belongings within reach  - Initiate and maintain comfort rounds  - Make Fall Risk Sign visible to staff  - Offer Toileting every 2 Hours, in advance of need  - Initiate/Maintain bed/chair alarm  - Obtain necessary fall risk management equipment: bed/chair alarm, call bell, gripper socks, walker, bedside commode  - Apply yellow socks and bracelet for high fall risk patients  - Consider moving patient to room near nurses station  Outcome: Not Progressing     Problem: PAIN - ADULT  Goal: Verbalizes/displays adequate comfort level or baseline comfort level  Description: Interventions:  - Encourage patient to monitor pain and request assistance  - Assess pain using appropriate pain scale  - Administer analgesics based on type and severity of pain and evaluate response  - Implement non-pharmacological measures as appropriate and evaluate response  - Consider cultural and social influences on pain and pain management  - Notify physician/advanced practitioner if interventions unsuccessful or patient reports new pain  Outcome: Progressing     Problem: INFECTION - ADULT  Goal: Absence or prevention of progression during hospitalization  Description: INTERVENTIONS:  - Assess and monitor for signs and symptoms of infection  - Monitor lab/diagnostic results  - Monitor all insertion sites, i.e. indwelling lines, tubes, and drains  - Monitor endotracheal if appropriate and nasal secretions for changes in amount and color  - [place] appropriate cooling/warming therapies per order  - Administer medications as ordered  - Instruct and encourage patient and family to use good hand hygiene technique  - Identify and instruct in appropriate isolation precautions (contact isolation for CRE) for identified infection/condition  Outcome: Not Progressing     Problem: SAFETY ADULT  Goal: Patient will remain free of falls  Description: INTERVENTIONS:  - Educate patient/family on patient safety including physical limitations  - Instruct patient to call for assistance with activity   - Consult OT/PT to assist with strengthening/mobility   - Keep Call bell within reach  - Keep bed low and locked with side rails adjusted as appropriate  - Keep care items and personal belongings within reach  - Initiate and maintain comfort rounds  - Make Fall Risk Sign visible to staff  - Offer Toileting every 2 Hours, in advance of need  - Initiate/Maintain bed/chair alarm  - Obtain necessary fall risk management equipment: bed/chair alarm, call bell, gripper socks, walker, bedside commode  - Apply yellow socks and bracelet for high fall risk patients  - Consider moving patient to room near nurses station  Outcome: Progressing  Goal: Maintain or return to baseline ADL function  Description: INTERVENTIONS:  - Educate patient/family on patient safety including physical limitations  - Instruct patient to call for assistance with activity   - Consult OT/PT to assist with strengthening/mobility   - Keep Call bell within reach  - Keep bed low and locked with side rails adjusted as appropriate  - Keep care items and personal belongings within reach  - Initiate and maintain comfort rounds  - Make Fall Risk Sign visible to staff  - Offer Toileting every 2 Hours, in advance of need  - Initiate/Maintain bed/chair alarm  - Obtain necessary fall risk management equipment: bed/chair alarm, call bell, gripper socks, walker, bedside commode  - Apply yellow socks and bracelet for high fall risk patients  - Consider moving patient to room near nurses station  Outcome: Not Progressing     Problem: DISCHARGE PLANNING  Goal: Discharge to home or other facility with appropriate resources  Description: INTERVENTIONS:  - Identify barriers to discharge w/patient  - Arrange for needed discharge resources and transportation as appropriate  - Identify discharge learning needs   - Refer to Case Management Department for coordinating discharge planning if the patient needs post-hospital services based on physician/advanced practitioner order or complex needs related to functional status, cognitive ability, or social support system  Outcome: Not Progressing     Problem: Prexisting or High Potential for Compromised Skin Integrity  Goal: Skin integrity is maintained or improved  Description: INTERVENTIONS:  - Identify patients at risk for skin breakdown  - Assess and monitor skin integrity  - Assess and monitor nutrition and hydration status  - Monitor labs   - Assess for incontinence   - Turn and reposition patient  - Assist with mobility/ambulation  - Relieve pressure over bony prominences  - Avoid friction and shearing  - Provide appropriate hygiene as needed including keeping skin clean and dry  - Evaluate need for skin moisturizer/barrier cream  - Collaborate with interdisciplinary team   - Patient/family teaching  - Consider wound care consult   Outcome: Not Progressing

## 2023-09-04 NOTE — ASSESSMENT & PLAN NOTE
Wt Readings from Last 3 Encounters:   09/04/23 63.5 kg (139 lb 15.9 oz)   07/29/23 49.4 kg (108 lb 14.4 oz)   06/30/23 55.3 kg (122 lb)   · 8/27 CXR showed pulmonary congestion  · On PE soft tissue swelling, anasarca on imaging, + 15 l balance  · 8/29 S/p cardiac arrest with ROSC   · Repeat echo EF 15-20 %   · Beta blocker on hold due to hypotension. · 8/30 Started on milrinone, nor epi,   · 9/1 DC NE, started on vasopressi, c/w milrinone  · 9/2 DC milrinone and vasopressin due to afib.  Loaded with Digoxin  · Lasix 40 prn with good response

## 2023-09-04 NOTE — ASSESSMENT & PLAN NOTE
· The patient had a slight transaminitis during admission but over the past few days has steadily risen and then doubled on 8/26 with his AST 2800, ALT 1114  · Severe elevation of INR, out of proportion to liver injury  · Multifactorial - She was found to have a slightly elevated tylenol level (that continues to normalize) which may be a contributing factor however, not thought to be the only issue (ischemic vs viral hepatitis)  · She has HF, which may contribute to hepatotoxicity  · Additionally she was on a few drugs (Plaquinel, wellbutrin and lamictal) which can all be associated with liver failure  · Plaquinel and Wellbutrin held  · Doppler studies were normal, no thrombus  · Hepatitis panel on 8.24 was positive for hep C antibodies otherwise negative  · EBV ab/ag positive  · HSV, and VZV panels pending  · Started on acyclovir until HSV can be ruled out  · GI is following- appreciate their assistance with her care  · S/p NAC and fomepizole, dc on  8/28 as per Medical Toxicology  · Supportive care  · Vitamin K supplementation  · She would not be a candidate for a liver transplant  Recent Labs     09/02/23  0554 09/03/23  0509   AST 38 31   ALT 97* 71*   ALKPHOS 60 72   TBILI 3.31* 2.86*   BILIDIR 1.63*  --      · LFTs now improving. Likely in the setting of shock liver vs acetaminophen toxicity.

## 2023-09-04 NOTE — ASSESSMENT & PLAN NOTE
Recent Labs     09/02/23  1909 09/03/23  0509 09/04/23  0511   CREATININE 1.10 0.98 0.81   EGFR 51 59 74     Estimated Creatinine Clearance: 56.6 mL/min (by C-G formula based on SCr of 0.81 mg/dL). Suspect this is likely in the setting of poor perfusion and cardiorenal syndrome. Plan:  Avoid nephrotoxic agents. Continue monitoring renal indices.

## 2023-09-04 NOTE — ASSESSMENT & PLAN NOTE
Recent Labs     09/02/23  0554 09/03/23  0514 09/04/23  0511   HGB 8.8* 9.8* 10.9*     8/31 Hgb noted to be 6.4.  S/p 1 unit of PRBCs   Most recent hgb: 10.9  No signs or symptoms of GI bleed, bruisers over her knees bilaterally and upper extremities  DIC labs showed decreased fibrinogen but no fibrinogen split products, no schistocytes, LDH and haptoglobin normal  CT C/A/P no signs of internal bleeding  DVT doppler LE no DVT  · Monitor HGB, transfuse as needed

## 2023-09-04 NOTE — ASSESSMENT & PLAN NOTE
· New onset episode 9/2, received digoxin 500mcg x 1, followed by 250 6 h later, On 9/3 Digoxin 62.5 x1   · Today pt is in aFlutter  · Iodine allergy, unable to use amiodarone  · Replete electrolytes as needed for goal Mag > 2.0, Phos >3.0, K >4.0  · Telemetry monitoring  · Unable to anticoagulate 2/2 thrombocytopenia

## 2023-09-04 NOTE — PROGRESS NOTES
[facility]  Progress Note  Name: Kristine Bunch  MRN: [id number]  Unit/Bed#: ICU 04 I Date of Admission: 8/10/2023   Date of Service: 9/4/2023 I Hospital Day: 25    Assessment/Plan   * Acute respiratory failure with hypoxia [facility])  Assessment & Plan  · Patient was brought to the ED after being in altered mental status, requiring more oxygen. · History of multiple episodes of vomiting at night prior to admission  · Initially admitted with aspiration pneumonia, treated with a course of antibiotics and improved  · Remained on oxygen therapy, but developed a change in MS and required transfer back to the ICU  · She was noted to have an increase WOB and was placed on BIPAP  · This current episode does not appear to be related to an acute lung issue but more likely related to acute hepatic failure  · Patient is status postcardiac arrest on 8/30, was intubated   · CT H - no change from prior      Plan:  · Vent : 16/400/6/60%   · Goal SpO2 > 92%  · Not appropriate for extubation at this time.        Cardiac arrest [facility])  Assessment & Plan  Patient is a 48-year-old female  who was status postcardiac arrest with ROSC,   · It appeared patient became bradycardic and agonal respiration, patient arrested and CPR and ACLS protocol -CPR was started received epinephrine x 3, mag sulfate, bicarb x 2 , fibrillation, serum chloride dextrose presents from 927 to 939  · Patient now is a DNR level 2   · Milrinone and vasopressin held 9/2      Thrombocytopenia ([address])  Assessment & Plan  · Platelets 36 today  · No obvious source of bleeding, patient did not receive heparin  · CT abdomen pelvis showed no retroperitoneal hematoma  · DIC panel was done on 80/31s,: fibrin products more than 10, plasminogen low 20, Antithrombin low 19, APTT 40, INR 3.57, D-dimer elevated 8.49, fibrinogen low 169, platelets 41  · Hemolysis panel was equivocal, after globin normal, LDH borderline elevated  275, repeat test - was normal  Plan:  · Monitor for active bleeding  · Monitor platelets, transfuse as needed  · Hold DVT ppx    A-fib [facility])  Assessment & Plan  · New onset episode 9/2, received digoxin 500mcg x 1, followed by 250 6 h later, On 9/3 Digoxin 62.5 x1   · Today pt is in aFlutter  · Iodine allergy, unable to use amiodarone  · Replete electrolytes as needed for goal Mag > 2.0, Phos >3.0, K >4.0  · Telemetry monitoring  · Unable to anticoagulate 2/2 thrombocytopenia        Volume overload  Assessment & Plan  Most likely multifactorial, multiple comorbidities requiring infusions, cardiogenic shock with hypotension and kidney hypoperfusion, poor nutritional status and low albumin  S/a + 15 l      Plan:  · Strict I's and O's  · Daily weights  · Lasix prn - received 40mg x 1 9/4    HFrEF (heart failure with reduced ejection fraction) ([address])  Assessment & Plan  Wt Readings from Last 3 Encounters:   09/04/23 63.5 kg (139 lb 15.9 oz)   07/29/23 49.4 kg (108 lb 14.4 oz)   06/30/23 55.3 kg (122 lb)   · 8/27 CXR showed pulmonary congestion  · On PE soft tissue swelling, anasarca on imaging, + 15 l balance  · 8/29 S/p cardiac arrest with ROSC   · Repeat echo EF 15-20 %   · Beta blocker on hold due to hypotension. · 8/30 Started on milrinone, nor epi,   · 9/1 DC NE, started on vasopressi, c/w milrinone  · 9/2 DC milrinone and vasopressin due to afib. Loaded with Digoxin  · Lasix 40 prn with good response    Anemia  Assessment & Plan  Recent Labs     09/02/23  0554 09/03/23  0514 09/04/23  0511   HGB 8.8* 9.8* 10.9*     8/31 Hgb noted to be 6.4.  S/p 1 unit of PRBCs   Most recent hgb: 10.9  No signs or symptoms of GI bleed, bruisers over her knees bilaterally and upper extremities  DIC labs showed decreased fibrinogen but no fibrinogen split products, no schistocytes, LDH and haptoglobin normal  CT C/A/P no signs of internal bleeding  DVT doppler LE no DVT  · Monitor HGB, transfuse as needed    Acute liver failure  Assessment & Plan  · The patient had a slight transaminitis during admission but over the past few days has steadily risen and then doubled on 8/26 with his AST 2800, ALT 1114  · Severe elevation of INR, out of proportion to liver injury  · Multifactorial - She was found to have a slightly elevated tylenol level (that continues to normalize) which may be a contributing factor however, not thought to be the only issue (ischemic vs viral hepatitis)  · She has HF, which may contribute to hepatotoxicity  · Additionally she was on a few drugs (Plaquinel, wellbutrin and lamictal) which can all be associated with liver failure  · Plaquinel and Wellbutrin held  · Doppler studies were normal, no thrombus  · Hepatitis panel on 8.24 was positive for hep C antibodies otherwise negative  · EBV ab/ag positive  · HSV, and VZV panels pending  · Started on acyclovir until HSV can be ruled out  · GI is following- appreciate their assistance with her care  · S/p NAC and fomepizole, dc on  8/28 as per Medical Toxicology  · Supportive care  · Vitamin K supplementation  · She would not be a candidate for a liver transplant  Recent Labs     09/02/23  0554 09/03/23  0509   AST 38 31   ALT 97* 71*   ALKPHOS 60 72   TBILI 3.31* 2.86*   BILIDIR 1.63*  --      · LFTs now improving. Likely in the setting of shock liver vs acetaminophen toxicity. Multiple wounds  Assessment & Plan  Multiple wounds and skin lesions back, lower and upper extremities  Bluish discoloration of the toes bilaterally  Multiple blisters on the bilateral feet  · Proceed with wound care recommendations    Hypoglycemia  Assessment & Plan  · Current hypoglycemia may be related to her liver failure  · S/p D10  · No hypoglycemic episodes noted today.     Plan:  Q6 finger sticks       Closed T12 fracture ([address])  Assessment & Plan  Patient has a history of low impact fall that resulted in multiple T-spine fractures  Currently pain controlled by morphine    Plan:  · Hold gabapentin  · She is on narcotics chronically, but has been hospitalized for about 2 weeks with variable use  · PRN fentanyl       Dysphagia  Assessment & Plan  · Most likely contributing to her respiratory status initially  · peg tube placed 8/17  · Continue tube feeds      Altered mental status  Assessment & Plan  · Noted to be more altered on 8/26 with eye opening to name but not verbal, not following commands  · 8/27 Mental status wavering this morning  · Ammonia level was normal blood sugar was normal head cT without acute abnormality  · Liver enzymes trending down, no asterixis on exam    Plan:  · Continue monitoring fingerstick blood glucose every 6 hours- high risk for hypoglycemia  · Hypoglycemia protocol  · SSI  · Frequent neurochecks    History of stroke  Assessment & Plan  · Patient had right MCA stroke, s/p mechanical thrombectomy earlier this year  · Although the patient moves all the limbs, there is residual weakness in the left upper and lower limb  · CT head on August 8 shows No acute intracranial abnormality    Plan:  · Hold Eliquis for now given elevated INR in the setting of her liver failure  · No DVT ppx in setting of thrombocytopenia  · Frequent neurochecks  · CTH remains stable       Hypercalcemia  Assessment & Plan  · 2/2 hyperparathyroidism  · follows with endocrine  · sensipar on hold 2/2 unable to crush    LAILA (acute kidney injury) [facility])  Assessment & Plan  Recent Labs     09/02/23  1909 09/03/23  0509 09/04/23  0511   CREATININE 1.10 0.98 0.81   EGFR 51 59 74     Estimated Creatinine Clearance: 56.6 mL/min (by C-G formula based on SCr of 0.81 mg/dL). Suspect this is likely in the setting of poor perfusion and cardiorenal syndrome. Plan:  Avoid nephrotoxic agents. Continue monitoring renal indices.      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 Lower dose started 2/2 sedation and uptitrate as able. · Lamictal is also associated with hepatic toxicity but given her seizure risk, we will continue this for now             ICU Core Measures     Vented Patient  VAP Bundle  VAP bundle ordered     A: Assess, Prevent, and Manage Pain · Has pain been assessed? Yes  · Need for changes to pain regimen? No   B: Both Spontaneous Awakening Trials (SATs) and Spontaneous Breathing Trials (SBTs) · Plan to perform spontaneous awakening trial today? No secondary to multiple organ faillure  · Plan to perform spontaneous breathing trial today? No secondary to multiple organ failure  · Obvious barriers to extubation? Yes   C: Choice of Sedation · RASS Goal: -3 Moderate Sedation or 0 Alert and Calm  · Need for changes to sedation or analgesia regimen? No   D: Delirium · CAM-ICU: Negative   E: Early Mobility  · Plan for early mobility? No   F: Family Engagement · Plan for family engagement today? Yes       Review of Invasive Devices:     [place] Plan: Continue for accurate I/O monitoring for 48 hours  Central access plan: Medications requiring central line  Verito Plan: Keep arterial line for hemodynamic monitoring    Prophylaxis:  VTE    Stress Ulcer  covered byomeprazole (PRILOSEC) suspension 2 mg/mL [[id number]]       Subjective   HPI/24hr events: no acute events overnight, pt is hemodynamically stable  Unable to assess ROS due to sedation      Objective                            Vitals I/O      Most Recent Min/Max in 24hrs   Temp (!) 101.1 °F (38.4 °C) Temp  Min: 99.7 °F (37.6 °C)  Max: 101.5 °F (38.6 °C)   Pulse (!) 110 Pulse  Min: 78  Max: 126   Resp 21 Resp  Min: 14  Max: 28   /77 No data recorded   O2 Sat 100 % SpO2  Min: 97 %  Max: 100 %      Intake/Output Summary (Last 24 hours) at 9/4/2023 0643  Last data filed at 9/4/2023 0523  Gross per 24 hour   Intake 1476 ml   Output 3060 ml   Net -1584 ml         Diet Enteral/Parenteral; Tube Feeding No Oral Diet; Jevity 1.2 Prabhjot; Continuous; 50; 100; Every 4 hours     Invasive Monitoring Physical exam   Arterial Line  Verito /56  Arterial Line BP  Min: 96/74  Max: 150/70   MAP 84 mmHg  Arterial Line MAP (mmHg)  Min: 80 mmHg  Max: 102 mmHg     PA Catheter   Most Recent  Min/Max in 24hrs    PAP   No data recorded   CVP   No data recorded   CI    No data recorded   SVR   No data recorded           Neuro Invasive Monitoring   Most Recent  Min/Max in 24hrs    GCS 5  [place] Coma Scale Score  Min: 5  Max: 6    ICP     No data recorded    CPP (cuff)    No data recorded    CPP(verito)    No data recorded   CVP   No data recorded     ECMO:   Most Recent Min/Max in 24hrs   Flow (LPM)   No data recorded   Speed (RPM)   No data recorded    Flow   No data recorded   FIO2        LVAD   Most Recent  Min/Max in 24hrs    Pump Flow (LPM)   No data recorded    Pump Speed (RPM)   No data recorded   Pump Power    No data recorded    Pulsatility Index   No data recorded    Physical Exam  Eyes:      Conjunctiva/sclera: Conjunctivae normal.   Skin:     Findings: Lesion and wound present. Comments: Multiple wounds on b/l upper and lower extremities  Multiple blisters on feet  Bluish discoloration of the toes b/l   HENT:      Head: Normocephalic and atraumatic. Mouth/Throat:      Mouth: Mucous membranes are moist.   Neck:      Comments: Extended neck as a result of residual post stroke changesCardiovascular:      Rate and Rhythm: Regular rhythm. Tachycardia present. Pulses: Normal pulses. Abdominal:      Palpations: Abdomen is soft. Constitutional:       General: She is not in acute distress. Appearance: She is ill-appearing. Interventions: She is intubated. Pulmonary:      Effort: Pulmonary effort is normal. She is intubated. Neurological:      Comments: Pt is randomly opening eyes, not following commands    Genitourinary/Anorectal:  [place]       Diagnostic Studies      EKG: reviewed  Imaging:  I have personally reviewed pertinent reports.        Medications:  Scheduled PRN   bisacodyl, 10 mg, Daily  chlorhexidine, 15 mL, Q12H [address]  digoxin, 62.5 mcg, Daily  DULoxetine, 60 mg, BID  furosemide, 40 mg, Daily  gabapentin, 100 mg, HS  glycerin-hypromellose-, 1 drop, TID  insulin lispro, 1-5 Units, Q6H KHARI  lamoTRIgine, 50 mg, HS  lidocaine, 1 patch, Daily  omeprazole (PRILOSEC) suspension 2 mg/mL, 20 mg, Daily  oxyCODONE, 5 mg, Q8H  polyethylene glycol, 17 g, Daily      fentanyl citrate (PF), 50 mcg, Q2H PRN       Continuous          Labs:    CBC    Recent Labs     09/03/23  0514 09/04/23  0511   WBC 10.65* 12.35*   HGB 9.8* 10.9*   HCT 32.2* 36.9   PLT 27* 36*     BMP    Recent Labs     09/03/23  0509 09/04/23  0511   SODIUM 148* 149*   K 4.0 3.6   * 109*   CO2 31 36*   AGAP 6 4   BUN 50* 46*   CREATININE 0.98 0.81   CALCIUM 9.6 9.7       Coags    Recent Labs     09/02/23  0734 09/02/23  1150   INR 1.69* 1.74*   PTT 29  --         Additional Electrolytes  Recent Labs     09/02/23  1909 09/03/23  0509 09/04/23  0511   MG 1.8* 1.9 2.0   PHOS 2.5  --  2.7          Blood Gas    No recent results  Recent Labs     09/03/23  0509   PHVEN 7.446*   WAL6CFR 51.7*   PO2VEN 37.6   FXQ5FBB 34.8*   BEVEN 9.4    LFTs  Recent Labs     09/03/23  0509   ALT 71*   AST 31   ALKPHOS 72   ALB 2.7*   TBILI 2.86*       Infectious  No recent results  Glucose  Recent Labs     09/02/23  1909 09/03/23  0509 09/04/23  0511   GLUC 124 105 105               Critical Care Time Delivered: Upon my evaluation, this patient had a high probability of imminent or life-threatening deterioration due to Multiple organ failure, which required my direct attention, intervention, and personal management. I have personally provided 30 minutes of critical care time, exclusive of procedures, teaching, family meetings, and any prior time recorded by providers other than myself.      Josefina Evangelista MD

## 2023-09-04 NOTE — ASSESSMENT & PLAN NOTE
· Platelets 36 today  · No obvious source of bleeding, patient did not receive heparin  · CT abdomen pelvis showed no retroperitoneal hematoma  · DIC panel was done on 80/31s,: fibrin products more than 10, plasminogen low 20, Antithrombin low 19, APTT 40, INR 3.57, D-dimer elevated 8.49, fibrinogen low 169, platelets 41  · Hemolysis panel was equivocal, after globin normal, LDH borderline elevated  275, repeat test - was normal  Plan:  · Monitor for active bleeding  · Monitor platelets, transfuse as needed  · Hold DVT ppx

## 2023-09-04 NOTE — ASSESSMENT & PLAN NOTE
· Patient was brought to the ED after being in altered mental status, requiring more oxygen. · History of multiple episodes of vomiting at night prior to admission  · Initially admitted with aspiration pneumonia, treated with a course of antibiotics and improved  · Remained on oxygen therapy, but developed a change in MS and required transfer back to the ICU  · She was noted to have an increase WOB and was placed on BIPAP  · This current episode does not appear to be related to an acute lung issue but more likely related to acute hepatic failure  · Patient is status postcardiac arrest on 8/30, was intubated   · CT H - no change from prior      Plan:  · Vent : 16/400/6/60%   · Goal SpO2 > 92%  · Not appropriate for extubation at this time.

## 2023-09-04 NOTE — ASSESSMENT & PLAN NOTE
Patient is a 69-year-old female  who was status postcardiac arrest with ROSC,   · It appeared patient became bradycardic and agonal respiration, patient arrested and CPR and ACLS protocol -CPR was started received epinephrine x 3, mag sulfate, bicarb x 2 , fibrillation, serum chloride dextrose presents from 927 to 939  · Patient now is a DNR level 2   · Milrinone and vasopressin held 9/2

## 2023-09-04 NOTE — ASSESSMENT & PLAN NOTE
Most likely multifactorial, multiple comorbidities requiring infusions, cardiogenic shock with hypotension and kidney hypoperfusion, poor nutritional status and low albumin  S/a + 15 l      Plan:  · Strict I's and O's  · Daily weights  · Lasix prn - received 40mg x 1 9/4

## 2023-09-04 NOTE — ASSESSMENT & PLAN NOTE
· Noted to be more altered on 8/26 with eye opening to name but not verbal, not following commands  · 8/27 Mental status wavering this morning  · Ammonia level was normal blood sugar was normal head cT without acute abnormality  · Liver enzymes trending down, no asterixis on exam    Plan:  · Continue monitoring fingerstick blood glucose every 6 hours- high risk for hypoglycemia  · Hypoglycemia protocol  · SSI  · Frequent neurochecks

## 2023-09-05 PROBLEM — R65.10 SIRS (SYSTEMIC INFLAMMATORY RESPONSE SYNDROME) (HCC): Status: ACTIVE | Noted: 2023-01-01

## 2023-09-05 NOTE — ASSESSMENT & PLAN NOTE
Patient is a 21-year-old female  who was status postcardiac arrest with ROSC,   · It appeared patient became bradycardic and agonal respiration, patient arrested and CPR and ACLS protocol -CPR was started received epinephrine x 3, mag sulfate, bicarb x 2 , fibrillation, serum chloride dextrose presents from 927 to 939  · Patient now is a DNR level 2   · Milrinone and vasopressin DC 9/2

## 2023-09-05 NOTE — ASSESSMENT & PLAN NOTE
· Noted to be more altered on 8/26 with eye opening to name but not verbal, not following commands  · 8/27 Mental status wavering this morning  · Ammonia level was normal blood sugar was normal head cT without acute abnormality  · Liver enzymes trending down, no asterixis on exam  AMS most likely multifactorial due to multiple organ failure  Plan:  · Pt is not on sedating medications, randomly opening eyes, not able to follow comands

## 2023-09-05 NOTE — ASSESSMENT & PLAN NOTE
Patient has a history of low impact fall that resulted in multiple T-spine fractures  Currently pain controlled by morphine    Plan:  · C/w gabapentin  · She is on narcotics chronically, but has been hospitalized for about 2 weeks with variable use  · PRN fentanyl

## 2023-09-05 NOTE — ASSESSMENT & PLAN NOTE
· Platelets 45 today  · No obvious source of bleeding, patient did not receive heparin  · CT abdomen pelvis showed no retroperitoneal hematoma  · DIC panel was done on 80/31s,: fibrin products more than 10, plasminogen low 20, Antithrombin low 19, APTT 40, INR 3.57, D-dimer elevated 8.49, fibrinogen low 169, platelets 41  · Hemolysis panel was equivocal, after globin normal, LDH borderline elevated  275, repeat test - was normal  Plan:  · Monitor for active bleeding  · Monitor platelets, transfuse as needed  · Hold DVT ppx

## 2023-09-05 NOTE — ASSESSMENT & PLAN NOTE
Recent Labs     09/03/23  0509 09/04/23  0511 09/05/23  0346   CREATININE 0.98 0.81 0.79   EGFR 59 74 76     Estimated Creatinine Clearance: 58 mL/min (by C-G formula based on SCr of 0.79 mg/dL). Suspect this is likely in the setting of poor perfusion and cardiorenal syndrome. Plan:  Avoid nephrotoxic agents. Continue monitoring renal indices.

## 2023-09-05 NOTE — PROCEDURES
Midline Insertion    Date/Time: 9/5/2023 8:57 AM    Performed by: Hamlet Lin RN  Authorized by: Desiree Painting MD    Patient location:  Bedside  Consent:     Consent obtained: per protocol no consent required. Universal protocol:     Procedure explained and questions answered to patient or proxy's satisfaction: yes      Relevant documents present and verified: yes      Site/side marked: yes      Immediately prior to procedure, a time out was called: yes      Patient identity confirmed:  Arm band, hospital-assigned identification number and anonymous protocol, patient vented/unresponsive  Pre-procedure details:     Hand hygiene: Hand hygiene performed prior to insertion      Sterile barrier technique: All elements of maximal sterile technique followed      Skin preparation:  ChloraPrep    Skin preparation agent: Skin preparation agent completely dried prior to procedure    Anesthesia (see MAR for exact dosages): Anesthesia method:  None  Procedure details:     Location:  Left basilic    Laterality:  Left    Catheter size:  18 gauge    Landmarks identified: yes      Ultrasound guidance: yes      Ultrasound image availability:  Not saved    Sterile ultrasound techniques: Sterile gel and sterile probe covers were used      Number of attempts:  1    Successful placement: yes      Cath access vessel: midline. Catheter length (cm):  10    Exposed catheter length (cm):  0    Arm circumference (cm):  28    Lot number:  [id number]  Post-procedure details:     Post-procedure:  Dressing applied and securement device placed    Assessment:  Blood return through all ports and free fluid flow    Post-procedure complications: none      Patient tolerance of procedure:   Tolerated well, no immediate complications

## 2023-09-05 NOTE — PLAN OF CARE
Problem: INFECTION - ADULT  Goal: Absence or prevention of progression during hospitalization  Description: INTERVENTIONS:  Midline procedure and maintenance  - Assess and monitor for signs and symptoms of infection  - Monitor lab/diagnostic results  - Monitor all insertion sites, i.e. indwelling lines, tubes, and drains  - Monitor endotracheal if appropriate and nasal secretions for changes in amount and color  - [place] appropriate cooling/warming therapies per order  - Administer medications as ordered  - Instruct and encourage patient and family to use good hand hygiene technique  - Identify and instruct in appropriate isolation precautions (contact isolation for CRE) for identified infection/condition  Outcome: Progressing

## 2023-09-05 NOTE — ASSESSMENT & PLAN NOTE
· Current hypoglycemia may be related to her liver failure  · S/p D10  · No hypoglycemic episodes noted today.     Plan:  Q6 finger sticks   Pt started on D 5 20 cc /h due to borderline hypoglycemia

## 2023-09-05 NOTE — ASSESSMENT & PLAN NOTE
· New onset episode 9/2, received digoxin 500mcg x 1, followed by 250 6 h later, On 9/3 Digoxin 62.5 daily  · Iodine allergy, unable to use amiodarone  · Replete electrolytes as needed for goal Mag > 2.0, Phos >3.0, K >4.0  · Telemetry monitoring  · Unable to anticoagulate 2/2 thrombocytopenia

## 2023-09-05 NOTE — PROGRESS NOTES
[facility]  Progress Note  Name: Kristine Bunch  MRN: [id number]  Unit/Bed#: ICU 04 I Date of Admission: 8/10/2023   Date of Service: 9/5/2023 I Hospital Day: 32    Assessment/Plan   * Acute respiratory failure with hypoxia [facility])  Assessment & Plan  · Patient was brought to the ED after being in altered mental status, requiring more oxygen. · History of multiple episodes of vomiting at night prior to admission  · Initially admitted with aspiration pneumonia, treated with a course of antibiotics and improved  · Remained on oxygen therapy, but developed a change in MS and required transfer back to the ICU  · She was noted to have an increase WOB and was placed on BIPAP  · This current episode does not appear to be related to an acute lung issue but more likely related to acute hepatic failure  · Patient is status postcardiac arrest on 8/30, was intubated   · CT H - no change from prior  · 9/4 proCal is trending up, pt spiked fever, pt met SIRS criteria, repeat CXR showed opacities, pt started on Vanc/Meropenem. Central and femoral lines removed, Ugalde exchanged. We'll follow Bx and consider to exchange midline. Plan:  · Vent : 16/400/6/60%   · Goal SpO2 > 92%  · Not appropriate for extubation at this time.        Cardiac arrest [facility])  Assessment & Plan  Patient is a 65-year-old female  who was status postcardiac arrest with ROSC,   · It appeared patient became bradycardic and agonal respiration, patient arrested and CPR and ACLS protocol -CPR was started received epinephrine x 3, mag sulfate, bicarb x 2 , fibrillation, serum chloride dextrose presents from 927 to 939  · Patient now is a DNR level 2   · Milrinone and vasopressin DC 9/2        Thrombocytopenia ([address])  Assessment & Plan  · Platelets 45 today  · No obvious source of bleeding, patient did not receive heparin  · CT abdomen pelvis showed no retroperitoneal hematoma  · DIC panel was done on 80/31s,: fibrin products more than 10, plasminogen low 20, Antithrombin low 19, APTT 40, INR 3.57, D-dimer elevated 8.49, fibrinogen low 169, platelets 41  · Hemolysis panel was equivocal, after globin normal, LDH borderline elevated  275, repeat test - was normal  Plan:  · Monitor for active bleeding  · Monitor platelets, transfuse as needed  · Hold DVT ppx    A-fib [facility])  Assessment & Plan  · New onset episode 9/2, received digoxin 500mcg x 1, followed by 250 6 h later, On 9/3 Digoxin 62.5 daily  · Iodine allergy, unable to use amiodarone  · Replete electrolytes as needed for goal Mag > 2.0, Phos >3.0, K >4.0  · Telemetry monitoring  · Unable to anticoagulate 2/2 thrombocytopenia        Volume overload  Assessment & Plan  Most likely multifactorial, multiple comorbidities requiring infusions, cardiogenic shock with hypotension and kidney hypoperfusion, poor nutritional status and low albumin  S/a + 14  On 9/4 pt received Lasix 40 + 20 IV, produced 2660      Plan:  · Strict I's and O's  · Daily weights  · Lasix 40 mg IV daily    HFrEF (heart failure with reduced ejection fraction) ([address])  Assessment & Plan  Wt Readings from Last 3 Encounters:   09/05/23 62.5 kg (137 lb 12.6 oz)   07/29/23 49.4 kg (108 lb 14.4 oz)   06/30/23 55.3 kg (122 lb)   · 8/27 CXR showed pulmonary congestion  · On PE soft tissue swelling, anasarca on imaging, + 15 l balance  · 8/29 S/p cardiac arrest with ROSC   · Repeat echo EF 15-20 %   · Beta blocker on hold due to hypotension. · 8/30 Started on milrinone, nor epi,   · 9/1 DC NE, started on vasopressi, c/w milrinone  · 9/2 DC milrinone and vasopressin due to afib. Loaded with Digoxin, c/w digoxin 62.5  daily  · Lasix 40 mg IV daily, additional dose prn    Anemia  Assessment & Plan  Recent Labs     09/03/23  0514 09/04/23  0511 09/05/23  0346   HGB 9.8* 10.9* 10.1*     8/31 Hgb noted to be 6.4.  S/p 1 unit of PRBCs   Most recent hgb: 10.1  No signs or symptoms of GI bleed, bruisers over her knees bilaterally and upper extremities  DIC labs showed decreased fibrinogen but no fibrinogen split products, no schistocytes, LDH and haptoglobin normal  CT C/A/P no signs of internal bleeding  DVT doppler LE no DVT  · Monitor HGB, transfuse as needed    Acute liver failure  Assessment & Plan  · The patient had a slight transaminitis during admission but over the past few days has steadily risen and then doubled on 8/26 with his AST 2800, ALT 1114  · Severe elevation of INR, out of proportion to liver injury  · Multifactorial - She was found to have a slightly elevated tylenol level (that continues to normalize) which may be a contributing factor however, not thought to be the only issue (ischemic vs viral hepatitis)  · She has HF, which may contribute to hepatotoxicity  · Additionally she was on a few drugs (Plaquinel, wellbutrin and lamictal) which can all be associated with liver failure  · Plaquinel and Wellbutrin held  · Doppler studies were normal, no thrombus  · Hepatitis panel on 8.24 was positive for hep C antibodies otherwise negative  · EBV ab/ag positive  · HSV, and VZV panels pending  · Started on acyclovir until HSV can be ruled out  · GI is following- appreciate their assistance with her care  · S/p NAC and fomepizole, dc on  8/28 as per Medical Toxicology  · Supportive care  · Vitamin K supplementation  · She would not be a candidate for a liver transplant  Recent Labs     09/03/23  0509 09/05/23  0346   AST 31 34   ALT 71* 42   ALKPHOS 72 72   TBILI 2.86* 2.87*     · LFTs now improving. Likely in the setting of shock liver vs acetaminophen toxicity.      Multiple wounds  Assessment & Plan  Multiple wounds and skin lesions back, lower and upper extremities  Bluish discoloration of the toes bilaterally  Multiple blisters on the bilateral feet  · Proceed with wound care recommendations    Hypoglycemia  Assessment & Plan  · Current hypoglycemia may be related to her liver failure  · S/p D10  · No hypoglycemic episodes noted today.    Plan:  Q6 finger sticks   Pt started on D 5 20 cc /h due to borderline hypoglycemia       SIRS (systemic inflammatory response syndrome) ([address])  Assessment & Plan  9/4 proCal is trending up, pt spiked fever, pt met SIRS criteria, repeat CXR showed opacities, pt started on Vanc/Meropenem. Central and femoral lines removed, Ugalde exchanged. · C/w Abx   · We'll follow Bx   · Consider to exchange midline      Closed T12 fracture [facility])  Assessment & Plan  Patient has a history of low impact fall that resulted in multiple T-spine fractures  Currently pain controlled by morphine    Plan:  · C/w gabapentin  · She is on narcotics chronically, but has been hospitalized for about 2 weeks with variable use  · PRN fentanyl       Dysphagia  Assessment & Plan  · Most likely contributing to her respiratory status initially  · peg tube placed 8/17  · Continue tube feeds      Altered mental status  Assessment & Plan  · Noted to be more altered on 8/26 with eye opening to name but not verbal, not following commands  · 8/27 Mental status wavering this morning  · Ammonia level was normal blood sugar was normal head cT without acute abnormality  · Liver enzymes trending down, no asterixis on exam  AMS most likely multifactorial due to multiple organ failure  Plan:  · Pt is not on sedating medications, randomly opening eyes, not able to follow comands    History of stroke  Assessment & Plan  · Patient had right MCA stroke, s/p mechanical thrombectomy earlier this year  · Although the patient moves all the limbs, there is residual weakness in the left upper and lower limb  · CT head on August 8 shows No acute intracranial abnormality.  Repeat [facility] 8/31 - no acute changes    Plan:  · Hold Eliquis for now given elevated INR in the setting of her liver failure  · No DVT ppx in setting of thrombocytopenia  · Frequent neurochecks      Hypercalcemia  Assessment & Plan  · 2/2 hyperparathyroidism  · follows with endocrine  · sensipar on hold 2/2 unable to crush    LAILA (acute kidney injury) [facility])  Assessment & Plan  Recent Labs     09/03/23  0509 09/04/23  0511 09/05/23  0346   CREATININE 0.98 0.81 0.79   EGFR 59 74 76     Estimated Creatinine Clearance: 58 mL/min (by C-G formula based on SCr of 0.79 mg/dL). Suspect this is likely in the setting of poor perfusion and cardiorenal syndrome. Plan:  Avoid nephrotoxic agents. Continue monitoring renal indices. 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 Lower dose started 2/2 sedation and uptitrate as able. · Lamictal is also associated with hepatic toxicity but given her seizure risk, we will continue this for now               ICU Core Measures     Vented Patient  VAP Bundle  VAP bundle ordered     A: Assess, Prevent, and Manage Pain · Has pain been assessed? Yes  · Need for changes to pain regimen? Yes   B: Both Spontaneous Awakening Trials (SATs) and Spontaneous Breathing Trials (SBTs) · Plan to perform spontaneous awakening trial today? No secondary to multiple comorbidities, ams  · Plan to perform spontaneous breathing trial today? No secondary to ams, multiple organ failure  · Obvious barriers to extubation? Yes   C: Choice of Sedation · RASS Goal: -2 Light Sedation or 0 Alert and Calm  · Need for changes to sedation or analgesia regimen? No   D: Delirium · CAM-ICU: Negative   E: Early Mobility  · Plan for early mobility? No   F: Family Engagement · Plan for family engagement today?  Yes       Antibiotic Review: to be discussed    Review of Invasive Devices: Ugalde Plan: Continue for accurate I/O monitoring for 48 hours        Prophylaxis:  VTE    Stress Ulcer  covered byomeprazole (PRILOSEC) suspension 2 mg/mL [[id number]]       Subjective  Unable to assess due to altered mental status  HPI/24hr events: No acute events overnight, patient is hemodynamically stable.   Unable to assess due to altered mental status     Objective                            Vitals I/O      Most Recent Min/Max in 24hrs   Temp 98.5 °F (36.9 °C) Temp  Min: 98.2 °F (36.8 °C)  Max: 101.5 °F (38.6 °C)   Pulse (!) 108 Pulse  Min: 84  Max: 120   Resp (!) 28 Resp  Min: 22  Max: 55   /68 BP  Min: 100/65  Max: 141/53   O2 Sat 97 % SpO2  Min: 83 %  Max: 99 %      Intake/Output Summary (Last 24 hours) at 9/5/2023 0759  Last data filed at 9/5/2023 0601  Gross per 24 hour   Intake 2627.66 ml   Output 2660 ml   Net -32.34 ml         Diet Enteral/Parenteral; Tube Feeding No Oral Diet; Jevity 1.2 Prabhjot; Continuous; 50; 100; Every 4 hours     Invasive Monitoring Physical exam   Arterial Line  [place] /54  Arterial Line BP  Min: 112/100  Max: 148/72   MAP 78 mmHg  Arterial Line MAP (mmHg)  Min: 78 mmHg  Max: 106 mmHg     PA Catheter   Most Recent  Min/Max in 24hrs    PAP   No data recorded   CVP   No data recorded   CI    No data recorded   SVR   No data recorded           Neuro Invasive Monitoring   Most Recent  Min/Max in 24hrs    GCS 5  [place] Coma Scale Score  Min: 5  Max: 6    ICP     No data recorded    CPP (cuff)    No data recorded    CPP([place])    No data recorded   CVP   No data recorded     ECMO:   Most Recent Min/Max in 24hrs   Flow (LPM)   No data recorded   Speed (RPM)   No data recorded    Flow   No data recorded   FIO2        LVAD   Most Recent  Min/Max in 24hrs    Pump Flow (LPM)   No data recorded    Pump Speed (RPM)   No data recorded   Pump Power    No data recorded    Pulsatility Index   No data recorded    Physical Exam  Skin:     Findings: Lesion and wound present. Comments: Bluish discolorations of toes laterally  Massive blisters on bilateral toes  Multiple wounds upper and lower extremities, sacrum   HENT:      Head: Normocephalic and atraumatic. Mouth/Throat:      Mouth: Mucous membranes are moist.   Cardiovascular:      Rate and Rhythm: Regular rhythm. Tachycardia present. Abdominal:      Palpations: Abdomen is soft. Comments: PEG in place   Constitutional:       Appearance: She is ill-appearing. Interventions: She is sedated and intubated. Pulmonary:      Effort: She is intubated. Comments: Course breathing sounds consistent with chemical ventilation  Vent settings: 16/400/6/60 %  Genitourinary/Anorectal:  Ugalde       Diagnostic Studies      EKG: Reviewed  Imaging:  I have personally reviewed pertinent reports.        Medications:  Scheduled PRN   bisacodyl, 10 mg, Daily  chlorhexidine, 15 mL, Q12H [address]  digoxin, 62.5 mcg, Daily  DULoxetine, 60 mg, BID  furosemide, 40 mg, Daily  gabapentin, 100 mg, HS  glycerin-hypromellose-, 1 drop, TID  insulin lispro, 1-5 Units, Q6H KHARI  lamoTRIgine, 50 mg, HS  lidocaine, 1 patch, Daily  meropenem, 1,000 mg, Q8H  omeprazole (PRILOSEC) suspension 2 mg/mL, 20 mg, Daily  oxyCODONE, 5 mg, Q8H  polyethylene glycol, 17 g, Daily  vancomycin, 1,250 mg, Q24H      fentanyl citrate (PF), 50 mcg, Q2H PRN       Continuous    dextrose, 20 mL/hr, Last Rate: 20 mL/hr (09/04/23 1835)         Labs:    CBC    Recent Labs     09/04/23 0511 09/05/23 0346   WBC 12.35* 17.67*   HGB 10.9* 10.1*   HCT 36.9 32.9*   PLT 36* 45*     BMP    Recent Labs     09/04/23 0511 09/05/23 0346   SODIUM 149* 149*   K 3.6 3.8   * 108   CO2 36* 36*   AGAP 4 5   BUN 46* 46*   CREATININE 0.81 0.79   CALCIUM 9.7 9.3       Coags    Recent Labs     09/05/23 0346   INR 1.32*        Additional Electrolytes  Recent Labs     09/04/23  0511   MG 2.0   PHOS 2.7          Blood Gas    No recent results  No recent results LFTs  Recent Labs     09/05/23  0346   ALT 42   AST 34   ALKPHOS 72   ALB 2.4*   TBILI 2.87*       Infectious  Recent Labs     09/04/23  0511 09/05/23  0346   PROCALCITONI 0.55* 0.68*     Glucose  Recent Labs     09/04/23  0511 09/05/23  0346   GLUC 105 122               Critical Care Time Delivered: Upon my evaluation, this patient had a high probability of imminent or life-threatening deterioration due to Multiple organ failure, which required my direct attention, intervention, and personal management. I have personally provided 30 minutes of critical care time, exclusive of procedures, teaching, family meetings, and any prior time recorded by providers other than myself.      Frank Spear MD

## 2023-09-05 NOTE — ASSESSMENT & PLAN NOTE
9/4 proCal is trending up, pt spiked fever, pt met SIRS criteria, repeat CXR showed opacities, pt started on Vanc/Meropenem. Central and femoral lines removed, Ugalde exchanged.    · C/w Abx   · We'll follow Bx   · Consider to exchange midline

## 2023-09-05 NOTE — ASSESSMENT & PLAN NOTE
Most likely multifactorial, multiple comorbidities requiring infusions, cardiogenic shock with hypotension and kidney hypoperfusion, poor nutritional status and low albumin  S/a + 14  On 9/4 pt received Lasix 40 + 20 IV, produced 2346      Plan:  · Strict I's and O's  · Daily weights  · Lasix 40 mg IV daily

## 2023-09-05 NOTE — ASSESSMENT & PLAN NOTE
· The patient had a slight transaminitis during admission but over the past few days has steadily risen and then doubled on 8/26 with his AST 2800, ALT 1114  · Severe elevation of INR, out of proportion to liver injury  · Multifactorial - She was found to have a slightly elevated tylenol level (that continues to normalize) which may be a contributing factor however, not thought to be the only issue (ischemic vs viral hepatitis)  · She has HF, which may contribute to hepatotoxicity  · Additionally she was on a few drugs (Plaquinel, wellbutrin and lamictal) which can all be associated with liver failure  · Plaquinel and Wellbutrin held  · Doppler studies were normal, no thrombus  · Hepatitis panel on 8.24 was positive for hep C antibodies otherwise negative  · EBV ab/ag positive  · HSV, and VZV panels pending  · Started on acyclovir until HSV can be ruled out  · GI is following- appreciate their assistance with her care  · S/p NAC and fomepizole, dc on  8/28 as per Medical Toxicology  · Supportive care  · Vitamin K supplementation  · She would not be a candidate for a liver transplant  Recent Labs     09/03/23  0509 09/05/23  0346   AST 31 34   ALT 71* 42   ALKPHOS 72 72   TBILI 2.86* 2.87*     · LFTs now improving. Likely in the setting of shock liver vs acetaminophen toxicity.

## 2023-09-05 NOTE — ASSESSMENT & PLAN NOTE
Wt Readings from Last 3 Encounters:   09/05/23 62.5 kg (137 lb 12.6 oz)   07/29/23 49.4 kg (108 lb 14.4 oz)   06/30/23 55.3 kg (122 lb)   · 8/27 CXR showed pulmonary congestion  · On PE soft tissue swelling, anasarca on imaging, + 15 l balance  · 8/29 S/p cardiac arrest with ROSC   · Repeat echo EF 15-20 %   · Beta blocker on hold due to hypotension. · 8/30 Started on milrinone, nor epi,   · 9/1 DC [place], started on vasopressi, c/w milrinone  · 9/2 DC milrinone and vasopressin due to afib.  Loaded with Digoxin, c/w digoxin 62.5  daily  · Lasix 40 mg IV daily, additional dose prn

## 2023-09-05 NOTE — ASSESSMENT & PLAN NOTE
· Patient was brought to the ED after being in altered mental status, requiring more oxygen. · History of multiple episodes of vomiting at night prior to admission  · Initially admitted with aspiration pneumonia, treated with a course of antibiotics and improved  · Remained on oxygen therapy, but developed a change in MS and required transfer back to the ICU  · She was noted to have an increase WOB and was placed on BIPAP  · This current episode does not appear to be related to an acute lung issue but more likely related to acute hepatic failure  · Patient is status postcardiac arrest on 8/30, was intubated   · CT H - no change from prior  · 9/4 proCal is trending up, pt spiked fever, pt met SIRS criteria, repeat CXR showed opacities, pt started on Vanc/Meropenem. Central and femoral lines removed, Ugalde exchanged. We'll follow Bx and consider to exchange midline. Plan:  · Vent : 16/400/6/60%   · Goal SpO2 > 92%  · Not appropriate for extubation at this time.

## 2023-09-05 NOTE — ASSESSMENT & PLAN NOTE
Recent Labs     09/03/23  0514 09/04/23  0511 09/05/23  0346   HGB 9.8* 10.9* 10.1*     8/31 Hgb noted to be 6.4.  S/p 1 unit of PRBCs   Most recent hgb: 10.1  No signs or symptoms of GI bleed, bruisers over her knees bilaterally and upper extremities  DIC labs showed decreased fibrinogen but no fibrinogen split products, no schistocytes, LDH and haptoglobin normal  CT C/A/P no signs of internal bleeding  DVT doppler LE no DVT  · Monitor HGB, transfuse as needed

## 2023-09-05 NOTE — PLAN OF CARE
Problem: Potential for Falls  Goal: Patient will remain free of falls  Description: INTERVENTIONS:  - Educate patient/family on patient safety including physical limitations  - Instruct patient to call for assistance with activity   - Consult OT/PT to assist with strengthening/mobility   - Keep Call bell within reach  - Keep bed low and locked with side rails adjusted as appropriate  - Keep care items and personal belongings within reach  - Initiate and maintain comfort rounds  - Make Fall Risk Sign visible to staff  - Offer Toileting every 2 Hours, in advance of need  - Initiate/Maintain bed/chair alarm  - Obtain necessary fall risk management equipment: bed/chair alarm, call bell, gripper socks, walker, bedside commode  - Apply yellow socks and bracelet for high fall risk patients  - Consider moving patient to room near nurses station  Outcome: Progressing     Problem: MOBILITY - ADULT  Goal: Maintain or return to baseline ADL function  Description: INTERVENTIONS:  - Educate patient/family on patient safety including physical limitations  - Instruct patient to call for assistance with activity   - Consult OT/PT to assist with strengthening/mobility   - Keep Call bell within reach  - Keep bed low and locked with side rails adjusted as appropriate  - Keep care items and personal belongings within reach  - Initiate and maintain comfort rounds  - Make Fall Risk Sign visible to staff  - Offer Toileting every 2 Hours, in advance of need  - Initiate/Maintain bed/chair alarm  - Obtain necessary fall risk management equipment: bed/chair alarm, call bell, gripper socks, walker, bedside commode  - Apply yellow socks and bracelet for high fall risk patients  - Consider moving patient to room near nurses station  Outcome: Progressing  Goal: Maintains/Returns to pre admission functional level  Description: INTERVENTIONS:  - Perform BMAT or MOVE assessment daily.   - Set and communicate daily mobility goal to care team and patient. - Collaborate with rehabilitation services on mobility goals if consulted  - Reposition patient every 2 hours.   - Dangle patient 3 times a day  - Stand patient 3 times a day  - Ambulate patient 3 times a day  - Out of bed to chair 3 times a day   - Out of bed for meals  - Out of bed for toileting  - Record patient progress and toleration of activity level   Outcome: Progressing     Problem: PAIN - ADULT  Goal: Verbalizes/displays adequate comfort level or baseline comfort level  Description: Interventions:  - Encourage patient to monitor pain and request assistance  - Assess pain using appropriate pain scale  - Administer analgesics based on type and severity of pain and evaluate response  - Implement non-pharmacological measures as appropriate and evaluate response  - Consider cultural and social influences on pain and pain management  - Notify physician/advanced practitioner if interventions unsuccessful or patient reports new pain  Outcome: Progressing     Problem: INFECTION - ADULT  Goal: Absence or prevention of progression during hospitalization  Description: INTERVENTIONS:  - Assess and monitor for signs and symptoms of infection  - Monitor lab/diagnostic results  - Monitor all insertion sites, i.e. indwelling lines, tubes, and drains  - Monitor endotracheal if appropriate and nasal secretions for changes in amount and color  - [place] appropriate cooling/warming therapies per order  - Administer medications as ordered  - Instruct and encourage patient and family to use good hand hygiene technique  - Identify and instruct in appropriate isolation precautions (contact isolation for CRE) for identified infection/condition  Outcome: Progressing     Problem: SAFETY ADULT  Goal: Patient will remain free of falls  Description: INTERVENTIONS:  - Educate patient/family on patient safety including physical limitations  - Instruct patient to call for assistance with activity   - Consult OT/PT to assist with strengthening/mobility   - Keep Call bell within reach  - Keep bed low and locked with side rails adjusted as appropriate  - Keep care items and personal belongings within reach  - Initiate and maintain comfort rounds  - Make Fall Risk Sign visible to staff  - Offer Toileting every 2 Hours, in advance of need  - Initiate/Maintain bed/chair alarm  - Obtain necessary fall risk management equipment: bed/chair alarm, call bell, gripper socks, walker, bedside commode  - Apply yellow socks and bracelet for high fall risk patients  - Consider moving patient to room near nurses station  Outcome: Progressing  Goal: Maintain or return to baseline ADL function  Description: INTERVENTIONS:  - Educate patient/family on patient safety including physical limitations  - Instruct patient to call for assistance with activity   - Consult OT/PT to assist with strengthening/mobility   - Keep Call bell within reach  - Keep bed low and locked with side rails adjusted as appropriate  - Keep care items and personal belongings within reach  - Initiate and maintain comfort rounds  - Make Fall Risk Sign visible to staff  - Offer Toileting every 2 Hours, in advance of need  - Initiate/Maintain bed/chair alarm  - Obtain necessary fall risk management equipment: bed/chair alarm, call bell, gripper socks, walker, bedside commode  - Apply yellow socks and bracelet for high fall risk patients  - Consider moving patient to room near nurses station  Outcome: Progressing  Goal: Maintains/Returns to pre admission functional level  Description: INTERVENTIONS:  - Perform BMAT or MOVE assessment daily.   - Set and communicate daily mobility goal to care team and patient. - Collaborate with rehabilitation services on mobility goals if consulted  - Reposition patient every 2 hours.   - Dangle patient 3 times a day  - Stand patient 3 times a day  - Ambulate patient 3 times a day  - Out of bed to chair 3 times a day   - Out of bed for meals  - Out of bed for toileting  - Record patient progress and toleration of activity level   Outcome: Progressing     Problem: DISCHARGE PLANNING  Goal: Discharge to home or other facility with appropriate resources  Description: INTERVENTIONS:  - Identify barriers to discharge w/patient  - Arrange for needed discharge resources and transportation as appropriate  - Identify discharge learning needs   - Refer to Case Management Department for coordinating discharge planning if the patient needs post-hospital services based on physician/advanced practitioner order or complex needs related to functional status, cognitive ability, or social support system  Outcome: Progressing     Problem: Knowledge Deficit  Goal: Patient/family/caregiver demonstrates understanding of disease process, treatment plan, medications, and discharge instructions  Description: Complete learning assessment and assess knowledge base.   Interventions:  - Provide teaching at level of understanding  - Provide teaching via preferred learning methods  Outcome: Progressing     Problem: Prexisting or High Potential for Compromised Skin Integrity  Goal: Skin integrity is maintained or improved  Description: INTERVENTIONS:  - Identify patients at risk for skin breakdown  - Assess and monitor skin integrity  - Assess and monitor nutrition and hydration status  - Monitor labs   - Assess for incontinence   - Turn and reposition patient  - Assist with mobility/ambulation  - Relieve pressure over bony prominences  - Avoid friction and shearing  - Provide appropriate hygiene as needed including keeping skin clean and dry  - Evaluate need for skin moisturizer/barrier cream  - Collaborate with interdisciplinary team   - Patient/family teaching  - Consider wound care consult   Outcome: Progressing     Problem: Nutrition/Hydration-ADULT  Goal: Nutrient/Hydration intake appropriate for improving, restoring or maintaining nutritional needs  Description: Monitor and assess patient's nutrition/hydration status for malnutrition. Collaborate with interdisciplinary team and initiate plan and interventions as ordered. Monitor patient's weight and dietary intake as ordered or per policy. Utilize nutrition screening tool and intervene as necessary. Determine patient's food preferences and provide high-protein, high-caloric foods as appropriate.      INTERVENTIONS:  - Monitor oral intake, urinary output, labs, and treatment plans  - Assess nutrition and hydration status and recommend course of action  - Evaluate amount of meals eaten  - Do not leave patient alone while eating due to aspiration risk  - Assist patient with eating if necessary   - Allow adequate time for meals  - Recommend/ encourage appropriate diets, oral nutritional supplements, and vitamin/mineral supplements  - Order, calculate, and assess calorie counts as needed  - Assess need for intravenous fluids  - Provide specific nutrition/hydration education as appropriate  - Include patient in decisions related to nutrition  Outcome: Progressing     Problem: RESPIRATORY - ADULT  Goal: Achieves optimal ventilation and oxygenation  Description: INTERVENTIONS:  - Assess for changes in respiratory status  - Assess for changes in mentation and behavior  - Position to facilitate oxygenation and minimize respiratory effort  - Oxygen administered by appropriate delivery if ordered   - Encourage broncho-pulmonary hygiene including cough, deep breathe, Incentive Spirometry, flutter valve  - Assess the need for suctioning and aspirate as needed  - Assess and instruct to report SOB or any respiratory difficulty  - Respiratory Therapy support as indicated  Outcome: Progressing     Problem: GENITOURINARY - ADULT  Goal: Maintains or returns to baseline urinary function  Description: INTERVENTIONS:  - Assess urinary function  - Encourage oral fluids to ensure adequate hydration if ordered  - Administer IV fluids as ordered to ensure adequate hydration  - Administer ordered medications as needed  - Offer frequent toileting  - Follow urinary retention protocol if ordered  Outcome: Progressing  Goal: Urinary catheter remains patent  Description: INTERVENTIONS:  - Assess patency of urinary catheter  - Follow guidelines for intermittent irrigation of non-functioning urinary catheter  Outcome: Progressing     Problem: NEUROSENSORY - ADULT  Goal: Achieves stable or improved neurological status  Description: INTERVENTIONS  - Monitor and report changes in neurological status  - Monitor vital signs such as temperature, blood pressure, glucose, and any other labs ordered   - Initiate measures to prevent increased intracranial pressure  - Monitor for seizure activity and implement precautions if appropriate   Outcome: Progressing  Goal: Achieves maximal functionality and self care  Description: INTERVENTIONS  - Monitor swallowing and airway patency with patient fatigue and changes in neurological status  - Encourage and assist patient to increase activity and self care.    - Encourage visually impaired, hearing impaired and aphasic patients to use assistive/communication devices  Outcome: Progressing     Problem: GASTROINTESTINAL - ADULT  Goal: Maintains adequate nutritional intake  Description: INTERVENTIONS:  - Monitor percentage of each meal consumed  - Identify factors contributing to decreased intake, treat as appropriate  - Assist with meals as needed  - Monitor I&O, weight, and lab values if indicated  - Obtain nutrition services referral as needed  Outcome: Progressing  Goal: Establish and maintain optimal ostomy function  Description: INTERVENTIONS:  - Assess bowel function  - Encourage oral fluids to ensure adequate hydration  - Administer IV fluids if ordered to ensure adequate hydration   - Administer ordered medications as needed  - Encourage mobilization and activity  - Nutrition services referral to assist patient with appropriate food choices  - Assess stoma site  - Consider wound care consult   Outcome: Progressing     Problem: METABOLIC, FLUID AND ELECTROLYTES - ADULT  Goal: Electrolytes maintained within normal limits  Description: INTERVENTIONS:  - Monitor labs and assess patient for signs and symptoms of electrolyte imbalances  - Administer electrolyte replacement as ordered  - Monitor response to electrolyte replacements, including repeat lab results as appropriate  - Instruct patient on fluid and nutrition as appropriate  Outcome: Progressing  Goal: Fluid balance maintained  Description: INTERVENTIONS:  - Monitor labs   - Monitor I/O and WT  - Instruct patient on fluid and nutrition as appropriate  - Assess for signs & symptoms of volume excess or deficit  Outcome: Progressing  Goal: Glucose maintained within target range  Description: INTERVENTIONS:  - Monitor Blood Glucose as ordered  - Assess for signs and symptoms of hyperglycemia and hypoglycemia  - Administer ordered medications to maintain glucose within target range  - Assess nutritional intake and initiate nutrition service referral as needed  Outcome: Progressing     Problem: SKIN/TISSUE INTEGRITY - ADULT  Goal: Skin Integrity remains intact(Skin Breakdown Prevention)  Description: Assess:  -Inspect skin when repositioning, toileting, and assisting with ADLS  -Assess extremities for adequate circulation and sensation     Bed Management:  -Have minimal linens on bed & keep smooth, unwrinkled  -Change linens as needed when moist or perspiring    Toileting:  -Offer bedside commode    Activity:  -Encourage activity and walks on unit  -Encourage or provide ROM exercises   -Use appropriate equipment to lift or move patient in bed    Skin Care:  -Avoid use of baby powder, tape, friction and shearing, hot water or constrictive clothing  -Do not massage red bony areas  Outcome: Progressing  Goal: Incision(s), wounds(s) or drain site(s) healing without S/S of infection  Description: INTERVENTIONS  - Assess and document dressing, incision, wound bed, drain sites and surrounding tissue  - Provide patient and family education  Outcome: Progressing     Problem: HEMATOLOGIC - ADULT  Goal: Maintains hematologic stability  Description: INTERVENTIONS  - Assess for signs and symptoms of bleeding or hemorrhage  - Monitor labs  - Administer supportive blood products/factors as ordered and appropriate  Outcome: Progressing

## 2023-09-05 NOTE — WOUND OSTOMY CARE
Progress Note - Wound   Olamide Kennedy 70 y.o. female MRN: [id number]  Unit/Bed#: ICU 04 Encounter: [id number]        Assessment:   Patient seen for wound care follow-up at the request of nursing leadership due to concern for worsening sacral and right arms wounds during ICU rounds. Patient is intubated, receiving tube feeding, nutrition team following. On low air-loss mattress with positioning wedges and offloading heel boots in use. Bilateral heels intact with slowly blanching light purple coloration. Knees and feet with mottled appearance. All extremities cool to touch. Generalized edema present. Skin extremely fragile. Ugalde catheter in place, occasionally incontinent of stool. 1.  Hospital acquired deep tissue pressure injury to sacrum/buttocks--wound is evolving with dry, black and beefy red area over sacrum. Round purple, non-blanchable areas with blistering epidermis to bilateral buttocks. Santiago-wound pink, blanchable, intact. Scant serosanguinous drainage. No induration. 2.  Hospital acquired deep tissue pressure injury to right medial back--wound is evolving with blistering epidermis, purple, non-blanchable. Santiago-wound intact. No drainage or induration. 3.  Right pretibial wound--RESOLVED  4. Right arm wound--wound of unclear etiology, possibly IV infiltrate. Wound bed with yellow slough and light purple coloration, dark black dry edges. Santiago-wound with edema and ecchymosis. Moderate serosanguinous drainage. 5.  Left arm skin tear--beefy red and yellow slough with santiago-wound ecchymosis and edema. Small serosanguinous drainage. Measuring slightly larger. 6.  Bilateral feet--toes and heels are purple, feet cold with serous fluid filled blisters. Likely vascular in nature due to cardiac arrest.            See flowsheet for wound details. Wound care instructions/recommendations adjusted for evolving areas.   Would not recommend foam dressings to heels at this time due to blistering. Wound Care Plan:   1-Hydraguard lotion to bilateral heels twice daily and as needed. 2-Elevate heels off of bed/chair surface to offload pressure--offloading heel boots--do not use velcro over toe area of boots. 3-Low air-loss mattress. 4-Moisturize skin daily with skin nourishing lotion. 5-Turn/reposition every 2 hours while in bed, when medically stable, using positioning wedges for pressure re-distribution on skin. 6-Apply Allevyn Life foam dressing to bilateral elbows for prevention. Isacc with P.  Peel back at least daily for skin assessment and re-apply. Change dressing every 3 days and PRN. 7-Sacrum and right/mid back--cleanse with soap and water, pat dry. Apply Xeroform (cut to fit size of wound) and cover with Allevyn Life foam dressings. Dyke Prosper for treatment. Change dressings every other day and as needed. 8-Bilateral arms--cleanse with normal saline, pat dry. Apply non-adherent oil emulsion dressing (adaptic). Cover with maxorb Ag and ABD, secure with yomaira and tape. Change dressings daily and PRN soilage/displacement. (May also apply this treatment to B/L feet if blisters erupt). Wound care team to follow. Patient assessed along with primary RN. Patient would benefit from surgery consultation once right arm and sacral wounds demarcate depending on goals of care. Wound 08/25/23 Skin tear Pretibial Right (Active)   Wound Image   09/05/23 1505   Wound Length (cm) 0 cm 09/05/23 1505   Wound Width (cm) 0 cm 09/05/23 1505   Wound Depth (cm) 0 cm 09/05/23 1505   Wound Surface Area (cm^2) 0 cm^2 09/05/23 1505   Wound Volume (cm^3) 0 cm^3 09/05/23 1505   Calculated Wound Volume (cm^3) 0 cm^3 09/05/23 1505       Wound 08/26/23 Skin Tear Arm Distal;Left;Posterior; Lower (Active)   Wound Image   09/05/23 1511   Wound Length (cm) 3.3 cm 09/05/23 1511   Wound Width (cm) 3.2 cm 09/05/23 1511   Wound Depth (cm) 0.1 cm 09/05/23 1511   Wound Surface Area (cm^2) 10.56 cm^2 09/05/23 1511   Wound Volume (cm^3) 1. 056 cm^3 09/05/23 1511   Calculated Wound Volume (cm^3) 1.06 cm^3 09/05/23 1511   Change in Wound Size % -657.14 09/05/23 1511   Wound 08/26/23 Pressure Injury Back Medial;Right (Active)   Wound Image   09/05/23 1452   Wound Length (cm) 10 cm 09/05/23 1452   Wound Width (cm) 6.5 cm 09/05/23 1452   Wound Depth (cm) 0.1 cm 09/05/23 1452   Wound Surface Area (cm^2) 65 cm^2 09/05/23 1452   Wound Volume (cm^3) 6.5 cm^3 09/05/23 1452   Calculated Wound Volume (cm^3) 6.5 cm^3 09/05/23 1452   Change in Wound Size % -87206 09/05/23 1452   Wound 08/29/23 Skin Tear Arm Anterior;Right; Lower (Active)   Wound Image    09/05/23 1500   Wound Length (cm) 16 cm 09/05/23 1500   Wound Width (cm) 10.5 cm 09/05/23 1500   Wound Depth (cm) 0.2 cm 09/05/23 1500   Wound Surface Area (cm^2) 168 cm^2 09/05/23 1500   Wound Volume (cm^3) 33.6 cm^3 09/05/23 1500   Calculated Wound Volume (cm^3) 33.6 cm^3 09/05/23 1500   Change in Wound Size % -50 09/05/23 1500       Wound 09/01/23 Pressure Injury Buttocks (Active)   Wound Image   09/05/23 1451   Wound Length (cm) 8.5 cm 09/05/23 1451   Wound Width (cm) 12 cm 09/05/23 1451   Wound Depth (cm) 0.1 cm 09/05/23 1451   Wound Surface Area (cm^2) 102 cm^2 09/05/23 1451   Wound Volume (cm^3) 10.2 cm^3 09/05/23 1451   Calculated Wound Volume (cm^3) 10.2 cm^3 09/05/23 1451   Change in Wound Size % 43.96 09/05/23 450 Franck Burden BSN, RN, [facility]

## 2023-09-05 NOTE — PROGRESS NOTES
Edinson Cervantes is a 70 y.o. female who is currently ordered Vancomycin IV with management by the Pharmacy Consult Service. Relevant clinical data and objective / subjective history reviewed. Vancomycin Assessment:  Indication and Goal AUC/Trough:  Urinary tract infection (goal -600, trough >10); Pneumonia (goal -600, trough >10)  Clinical Status: stable  Micro:      Renal Function:  SCr: 0.79 mg/dL  CrCl: 59.9 mL/min  UOP: 1.8 mL/kg/hr  Renal replacement: Not on dialysis  Days of Therapy: 2  Current Dose:  1,250 mg q24h  Vancomycin Plan:  New Dosing:  Continue current dose of 1,250 mg q24h  Estimated AUC: 438 mcg*hr/mL  Estimated Trough: 11.9 mcg/mL  Next Level: 9/6 with AM labs  Renal Function Monitoring: Daily BMP and UOP assessment  Pharmacy will continue to follow closely for s/sx of nephrotoxicity, infusion reactions and appropriateness of therapy. BMP and CBC will be ordered per protocol. We will continue to follow the patient’s culture results and clinical progress daily.     Fabián Tam, PharmD, [address] [place] [address] and Internal Medicine Clinical Pharmacist  [phone] or via [facility]

## 2023-09-05 NOTE — CASE MANAGEMENT
Case Management Discharge Planning Note    Patient name Meg Boyle  Location ICU 04/ICU 04 MRN [id number]  : 1955 Date 2023       Current Admission Date: 8/10/2023  Current Admission Diagnosis:Acute respiratory failure with hypoxia [facility])   Patient Active Problem List    Diagnosis Date Noted   • SIRS (systemic inflammatory response syndrome) ([address]) 2023   • A-fib ([address]) 2023   • Thrombocytopenia ([address]) 2023   • Volume overload 2023   • Multiple wounds 2023   • Cardiac arrest ([address]) 2023   • Acute liver failure 2023   • Transaminitis 2023   • Pleural effusion, bilateral 2023   • Hypoglycemia 2023   • Closed T12 fracture ([address]) 08/10/2023   • Compression fracture of body of thoracic vertebra ([address]) 08/10/2023   • Acute respiratory failure with hypoxia ([address]) 08/10/2023   • Chronic combined systolic and diastolic heart failure ([address]) 08/10/2023   • Fall 2023   • Cervical dystonia 2023   • Sundowning 2023   • Night terrors 2023   • Leg hematoma, left, initial encounter 2023   • Cerebral aneurysm, nonruptured 05/15/2023   • Nocturnal hypoxia 2023   • Insomnia 2023   • Sinus tachycardia 2023   • Altered mental status 2023   • Gout of foot 2023   • Dysphagia 2023   • Wound of left ankle 2023   • Urinary tract infection without hematuria 2023   • Obstructive sleep apnea 2023   • History of stroke 2023   • History of bacteremia 2023   • Acute CVA (cerebrovascular accident) ([address]) 2023   • Hypercalcemia 2023   • Discoloration of skin of foot 2023   • HFrEF (heart failure with reduced ejection fraction) ([address]) 2023   • Stroke ([address]) 2023   • HTN (hypertension) 2023   • Cardiomyopathy ([address]) 2023   • Vitamin D insufficiency 2023   • LAILA (acute kidney injury) ([address]) 2022   • Polypharmacy 2022   • 'light-for-dates' infant with signs of fetal malnutrition 04/22/2022   • Bipolar depression ([address]) 03/04/2022   • Rheumatoid arthritis of multiple sites with negative rheumatoid factor ([address]) 03/04/2022   • Closed fracture of multiple ribs of right side 03/02/2022   • Traumatic pneumothorax 03/02/2022   • Closed fracture of transverse process of lumbar vertebra ([address]) 03/02/2022   • Liver contusion 03/02/2022   • Acute pain due to trauma 03/02/2022   • Acute pain of right shoulder 03/02/2022   • Severe protein-calorie malnutrition ([address]) 03/02/2022   • Undifferentiated connective tissue disease ([address]) 12/29/2021   • Primary generalized (osteo)arthritis 12/29/2021   • Peripheral neuropathy 12/29/2021   • Osteoporosis 12/29/2021   • Hx of compression fracture of spine 12/29/2021   • Lumbar spondylosis 12/29/2021   • Cervical spondylosis 12/29/2021   • Anemia 06/26/2021   • Chronic pain syndrome    • Ambulatory dysfunction    • Acute metabolic encephalopathy    • Seronegative arthropathy of multiple sites ([address])    • Gastroesophageal reflux disease without esophagitis    • Mixed stress and urge urinary incontinence    • CHF (congestive heart failure) ([address])       LOS (days): 26  Geometric Mean LOS (GMLOS) (days): 13.20  Days to GMLOS:-12.5     OBJECTIVE:  Risk of Unplanned Readmission Score: 63.27         Current admission status: Inpatient   Preferred Pharmacy:   [address] [facility]  [address] [address]  Phone: [phone] Fax: [phone] [facility], [address] [place] [address]  Phone: [phone] Fax: [phone]    Primary Care Provider: Caroline Anderson DO    Primary Insurance: MEDICARE  Secondary Insurance: [facility]    DISCHARGE DETAILS:                                                                                                 Additional Comments: Pt remains intubated.  Pt has poor prognosis at this time. Plan for family meeting. If family decides to continue with full care, pt will likely receive trach next week. CM to continue to follow for d/c planning.

## 2023-09-05 NOTE — ASSESSMENT & PLAN NOTE
· Patient had right MCA stroke, s/p mechanical thrombectomy earlier this year  · Although the patient moves all the limbs, there is residual weakness in the left upper and lower limb  · CT head on August 8 shows No acute intracranial abnormality.  Repeat [facility] 8/31 - no acute changes    Plan:  · Hold Eliquis for now given elevated INR in the setting of her liver failure  · No DVT ppx in setting of thrombocytopenia  · Frequent neurochecks

## 2023-09-06 PROBLEM — E80.6 HYPERBILIRUBINEMIA: Status: ACTIVE | Noted: 2023-01-01

## 2023-09-06 NOTE — ASSESSMENT & PLAN NOTE
Most likely multifactorial, multiple comorbidities requiring infusions, cardiogenic shock with hypotension and kidney hypoperfusion, poor nutritional status and low albumin  S/a + 14  On 9/4 pt received Lasix 40 + 20 IV, produced 2660  9/5 increased dose to Lasix 40 mg IV twice daily      Plan:  · Strict I's and O's  · Daily weights  · Lasix 40 mg IV twice daily

## 2023-09-06 NOTE — CONSULTS
Vancomycin therapy has been discontinued. Pharmacy will sign off. Thank you for this consult. Please do not hesitate to call us with questions or re-consult us if the need arises.     Bony Meneses, PharmD, [facility] Internal Medicine Clinical Pharmacist  [phone] or via [facility]

## 2023-09-06 NOTE — ASSESSMENT & PLAN NOTE
Wt Readings from Last 3 Encounters:   09/06/23 65.3 kg (143 lb 15.4 oz)   07/29/23 49.4 kg (108 lb 14.4 oz)   06/30/23 55.3 kg (122 lb)   · 8/27 CXR showed pulmonary congestion  · On PE soft tissue swelling, anasarca on imaging, + 15 l balance  · 8/29 S/p cardiac arrest with ROSC   · Repeat echo EF 15-20 %   · Beta blocker on hold due to hypotension. · 8/30 Started on milrinone, nor epi,   · 9/1 DC NE, started on vasopressi, c/w milrinone  · 9/2 DC milrinone and vasopressin due to afib.  Loaded with Digoxin, c/w digoxin 62.5  daily  · Lasix 40 mg IV twice daily

## 2023-09-06 NOTE — ACP (ADVANCE CARE PLANNING)
I had the meeting and discussion with patient's daughter by bedside, I updated her about the patient's condition and we discussed the advanced care planning, I offered her option of continuous management and support and consider tracheostomy early next week since she has been intubated for 8 days and also confirmed CPR status. She confirms level of care 2/DNR, she does not want tracheostomy but she is not ready to withdraw care at this time, will continue full management and support until Monday and then readdress the situation, she may consider comfort measures at that time if no improvement especially in her mental status. I started patient on pressure support ventilation while we are discussing the case and she tolerated very well, I explained to the patient's daughter that the main issue is the patient's mental status which has to improve before considering extubation. She verbalized understanding. We discussed considering empiric IV steroid treatment at some point and also imaging study for the brain will start with head CT scan.   We will start Solu-Medrol 40 mg every 6 hours empirically      Added ACP/critical care time 15 min

## 2023-09-06 NOTE — ASSESSMENT & PLAN NOTE
Patient is a 71-year-old female  who was status postcardiac arrest with ROSC,   · 8/29 It appeared patient became bradycardic and agonal respiration, patient arrested and CPR and ACLS protocol -CPR was started received epinephrine x 3, mag sulfate, bicarb x 2 , fibrillation, serum chloride dextrose presents from 927 to 939  · Patient now is a DNR level 2   · Milrinone and NE started on 8/29, Vasopressin replaced NE on 8/31.  Milrinone and vasopressin  DC'd on 9/2  · Midodrine added for borderline BP on 9/6

## 2023-09-06 NOTE — ASSESSMENT & PLAN NOTE
9/6 total bilirubin 1.86 (improved since yesterday 2.97), direct bilirubin 0.84  Repeat LDH wnl  Repeat haptoglobin in process  Right upper quadrant ultrasound: no stone, no obstruction, CBD not dilated

## 2023-09-06 NOTE — ASSESSMENT & PLAN NOTE
· New onset episode 9/2, received digoxin 500mcg x 1, followed by 250 6 h later, On 9/3 Digoxin 62.5 daily  · Iodine allergy, unable to use amiodarone  · Replete electrolytes as needed for goal Mag > 2.0, Phos >3.0, K >4.0  · Telemetry monitoring  · Unable to anticoagulate 2/2 thrombocytopenia  · Digoxin level within normal range

## 2023-09-06 NOTE — ASSESSMENT & PLAN NOTE
· Current hypoglycemia may be related to her liver failure  · S/p D10  · No hypoglycemic episodes noted today.     Plan:  Q6 finger sticks   Currently BS on a higher range given steroids was added to management  ISS

## 2023-09-06 NOTE — ASSESSMENT & PLAN NOTE
· The patient had a slight transaminitis during admission but over the past few days has steadily risen and then doubled on 8/26 with his AST 2800, ALT 1114  · Severe elevation of INR, out of proportion to liver injury  · Multifactorial - She was found to have a slightly elevated tylenol level (that continues to normalize) which may be a contributing factor however, not thought to be the only issue (ischemic vs viral hepatitis)  · She has HF, which may contribute to hepatotoxicity  · Additionally she was on a few drugs (Plaquinel, wellbutrin and lamictal) which can all be associated with liver failure  · Plaquinel and Wellbutrin held  · Doppler studies were normal, no thrombus  · Hepatitis panel on 8.24 was positive for hep C antibodies otherwise negative  · EBV ab/ag positive  · HSV, and VZV panels pending  · S/p acyclovir until HSV can be ruled out  · GI is following- appreciate their assistance with her care  · S/p NAC and fomepizole, dc on  8/28 as per Medical Toxicology  · Supportive care  · Vitamin K supplementation  · She would not be a candidate for a liver transplant  Recent Labs     09/05/23  0346 09/06/23  0443   AST 34  34 36   ALT 42  42 37   ALKPHOS 72  72 70   TBILI 2.87*  2.87* 1.86*   BILIDIR 1.32* 0.84*     · LFTs trended to normal range.  T Bili elevated, mixed picture with both direct and indirect bili elevation, improving

## 2023-09-06 NOTE — ASSESSMENT & PLAN NOTE
· Patient was brought to the ED after being in altered mental status, requiring more oxygen. · History of multiple episodes of vomiting at night prior to admission  · Initially admitted with aspiration pneumonia, treated with a course of antibiotics and improved  · Remained on oxygen therapy, but developed a change in MS and required transfer back to the ICU  · She was noted to have an increase WOB and was placed on BIPAP  · This current episode does not appear to be related to an acute lung issue but more likely related to acute hepatic failure  · Patient is status postcardiac arrest on 8/30, was intubated   · CT H - no change from prior  · 9/4 proCal is trending up, pt spiked fever, pt met SIRS criteria, repeat CXR showed opacities, pt started on Vanc/Meropenem, transitioned to CFX/Vanc. Central and femoral lines removed, Ugalde exchanged. We'll follow Bx and consider to exchange midline.     Plan:  · Vent : 16/400/6/60%   · Goal SpO2 > 92%  · Pt had trial on pressure support mode on 9/6 and did well, if mental status improving will consider extubation

## 2023-09-06 NOTE — ASSESSMENT & PLAN NOTE
· Patient was brought to the ED after being in altered mental status, requiring more oxygen. · History of multiple episodes of vomiting at night prior to admission  · Initially admitted with aspiration pneumonia, treated with a course of antibiotics and improved  · Remained on oxygen therapy, but developed a change in MS and required transfer back to the ICU  · She was noted to have an increase WOB and was placed on BIPAP  · This current episode does not appear to be related to an acute lung issue but more likely related to acute hepatic failure  · Patient is status postcardiac arrest on 8/30, was intubated   · CT H - no change from prior  · 9/4 proCal is trending up, pt spiked fever, pt met SIRS criteria, repeat CXR showed opacities, pt started on Vanc/Meropenem, transitioned to CFX/Vanc. Central and femoral lines removed, Ugalde exchanged. We'll follow Bx and consider to exchange midline. Plan:  · Vent : 16/400/6/60%   · Goal SpO2 > 92%  · Not appropriate for extubation at this time.

## 2023-09-06 NOTE — ASSESSMENT & PLAN NOTE
9/4 proCal is trending up, pt spiked fever, pt met SIRS criteria, repeat CXR showed opacities, pt started on Vanc/Meropenem. Central and femoral lines removed, Ugalde exchanged. · Antibiotic narrowed to ceftriaxone and vancomycin  · We'll follow final B culture results.   No growth in 24 hours  · Follow urine culture results

## 2023-09-06 NOTE — PLAN OF CARE
Problem: MOBILITY - ADULT  Goal: Maintain or return to baseline ADL function  Description: INTERVENTIONS:  - Educate patient/family on patient safety including physical limitations  - Instruct patient to call for assistance with activity   - Consult OT/PT to assist with strengthening/mobility   - Keep Call bell within reach  - Keep bed low and locked with side rails adjusted as appropriate  - Keep care items and personal belongings within reach  - Initiate and maintain comfort rounds  - Make Fall Risk Sign visible to staff  - Offer Toileting every 2 Hours, in advance of need  - Initiate/Maintain bed/chair alarm  - Obtain necessary fall risk management equipment: bed/chair alarm, call bell, gripper socks, walker, bedside commode  - Apply yellow socks and bracelet for high fall risk patients  - Consider moving patient to room near nurses station  Outcome: Not Progressing     Problem: PAIN - ADULT  Goal: Verbalizes/displays adequate comfort level or baseline comfort level  Description: Interventions:  - Encourage patient to monitor pain and request assistance  - Assess pain using appropriate pain scale  - Administer analgesics based on type and severity of pain and evaluate response  - Implement non-pharmacological measures as appropriate and evaluate response  - Consider cultural and social influences on pain and pain management  - Notify physician/advanced practitioner if interventions unsuccessful or patient reports new pain  Outcome: Progressing     Problem: SAFETY ADULT  Goal: Maintain or return to baseline ADL function  Description: INTERVENTIONS:  - Educate patient/family on patient safety including physical limitations  - Instruct patient to call for assistance with activity   - Consult OT/PT to assist with strengthening/mobility   - Keep Call bell within reach  - Keep bed low and locked with side rails adjusted as appropriate  - Keep care items and personal belongings within reach  - Initiate and maintain comfort rounds  - Make Fall Risk Sign visible to staff  - Offer Toileting every 2 Hours, in advance of need  - Initiate/Maintain bed/chair alarm  - Obtain necessary fall risk management equipment: bed/chair alarm, call bell, gripper socks, walker, bedside commode  - Apply yellow socks and bracelet for high fall risk patients  - Consider moving patient to room near nurses station  Outcome: Not Progressing     Problem: DISCHARGE PLANNING  Goal: Discharge to home or other facility with appropriate resources  Description: INTERVENTIONS:  - Identify barriers to discharge w/patient  - Arrange for needed discharge resources and transportation as appropriate  - Identify discharge learning needs   - Refer to Case Management Department for coordinating discharge planning if the patient needs post-hospital services based on physician/advanced practitioner order or complex needs related to functional status, cognitive ability, or social support system  Outcome: Not Progressing     Problem: Prexisting or High Potential for Compromised Skin Integrity  Goal: Skin integrity is maintained or improved  Description: INTERVENTIONS:  - Identify patients at risk for skin breakdown  - Assess and monitor skin integrity  - Assess and monitor nutrition and hydration status  - Monitor labs   - Assess for incontinence   - Turn and reposition patient  - Assist with mobility/ambulation  - Relieve pressure over bony prominences  - Avoid friction and shearing  - Provide appropriate hygiene as needed including keeping skin clean and dry  - Evaluate need for skin moisturizer/barrier cream  - Collaborate with interdisciplinary team   - Patient/family teaching  - Consider wound care consult   Outcome: Not Progressing

## 2023-09-06 NOTE — ASSESSMENT & PLAN NOTE
Recent Labs     09/04/23 2052 09/05/23 0346 09/06/23  0443   HGB 9.9* 10.1* 9.0*     8/31 Hgb noted to be 6.4.  S/p 1 unit of PRBCs   Most recent hgb: 9.7  No signs or symptoms of GI bleed, bruisers over her knees bilaterally and upper extremities  DIC labs showed decreased fibrinogen but no fibrinogen split products, no schistocytes, LDH and haptoglobin normal  CT C/A/P no signs of internal bleeding  DVT doppler LE no DVT  · Monitor HGB, transfuse as needed

## 2023-09-06 NOTE — ASSESSMENT & PLAN NOTE
Patient is a 28-year-old female  who was status postcardiac arrest with ROSC,   · It appeared patient became bradycardic and agonal respiration, patient arrested and CPR and ACLS protocol -CPR was started received epinephrine x 3, mag sulfate, bicarb x 2 , fibrillation, serum chloride dextrose presents from 927 to 939  · Patient now is a DNR level 2   · Milrinone and vasopressin DC 9/2

## 2023-09-06 NOTE — ASSESSMENT & PLAN NOTE
· The patient had a slight transaminitis during admission but over the past few days has steadily risen and then doubled on 8/26 with his AST 2800, ALT 1114  · Severe elevation of INR, out of proportion to liver injury  · Multifactorial - She was found to have a slightly elevated tylenol level (that continues to normalize) which may be a contributing factor however, not thought to be the only issue (ischemic vs viral hepatitis)  · She has HF, which may contribute to hepatotoxicity  · Additionally she was on a few drugs (Plaquinel, wellbutrin and lamictal) which can all be associated with liver failure  · Plaquinel and Wellbutrin held  · Doppler studies were normal, no thrombus  · Hepatitis panel on 8.24 was positive for hep C antibodies otherwise negative  · EBV ab/ag positive  · HSV, and VZV panels pending  · Started on acyclovir until HSV can be ruled out  · GI is following- appreciate their assistance with her care  · S/p NAC and fomepizole, dc on  8/28 as per Medical Toxicology  · Supportive care  · Vitamin K supplementation  · She would not be a candidate for a liver transplant  Recent Labs     09/05/23  0346 09/06/23  0443   AST 34  34 36   ALT 42  42 37   ALKPHOS 72  72 70   TBILI 2.87*  2.87* 1.86*   BILIDIR 1.32* 0.84*     · LFTs now improving. Likely in the setting of shock liver vs acetaminophen toxicity.

## 2023-09-06 NOTE — ASSESSMENT & PLAN NOTE
· Patient had right MCA stroke, s/p mechanical thrombectomy earlier this year  · Although the patient moves all the limbs, there is residual weakness in the left upper and lower limb  · CT head on August 8 shows No acute intracranial abnormality.  Repeat [facility] 8/31 - no acute changes    Plan:  · Hold Eliquis for now given elevated INR in the setting of her liver failure  · No DVT ppx in setting of thrombocytopenia  · Frequent neurochecks

## 2023-09-06 NOTE — PROGRESS NOTES
[address] [facility]  Progress Note  Name: Addie Gary  MRN: [id number]  Unit/Bed#: ICU 04 I Date of Admission: 8/10/2023   Date of Service: 9/6/2023 I Hospital Day: 32    Assessment/Plan   * Acute respiratory failure with hypoxia [facility])  Assessment & Plan  · Patient was brought to the ED after being in altered mental status, requiring more oxygen. · History of multiple episodes of vomiting at night prior to admission  · Initially admitted with aspiration pneumonia, treated with a course of antibiotics and improved  · Remained on oxygen therapy, but developed a change in MS and required transfer back to the ICU  · She was noted to have an increase WOB and was placed on BIPAP  · This current episode does not appear to be related to an acute lung issue but more likely related to acute hepatic failure  · Patient is status postcardiac arrest on 8/30, was intubated   · CT H - no change from prior  · 9/4 proCal is trending up, pt spiked fever, pt met SIRS criteria, repeat CXR showed opacities, pt started on Vanc/Meropenem, transitioned to CFX/Vanc. Central and femoral lines removed, Ugalde exchanged. We'll follow Bx and consider to exchange midline. Plan:  · Vent : 16/400/6/60%   · Goal SpO2 > 92%  · Not appropriate for extubation at this time.        Cardiac arrest [facility])  Assessment & Plan  Patient is a 57-year-old female  who was status postcardiac arrest with ROSC,   · It appeared patient became bradycardic and agonal respiration, patient arrested and CPR and ACLS protocol -CPR was started received epinephrine x 3, mag sulfate, bicarb x 2 , fibrillation, serum chloride dextrose presents from 927 to 939  · Patient now is a DNR level 2   · Milrinone and vasopressin DC 9/2          Thrombocytopenia ([address])  Assessment & Plan  · Platelets 48 today  · No obvious source of bleeding, patient did not receive heparin  · CT abdomen pelvis showed no retroperitoneal hematoma  · DIC panel was done on 80/31s,: fibrin products more than 10, plasminogen low 20, Antithrombin low 19, APTT 40, INR 3.57, D-dimer elevated 8.49, fibrinogen low 169, platelets 41  · Hemolysis panel was equivocal, after globin normal, LDH borderline elevated  275, repeat test - was normal  Plan:  · Immature platelet fraction 59.0%, C3-C4 low, ANABELLA is negative  · Repeat LDH and haptoglobin  · Follow autoimmune work-up (histone antibody, different diagnosis 3 antibody, anti myeloperoxidase antibody, anti-Citrulline aided peptide, until double-stranded antibody-in process)  · Monitor for active bleeding  · Monitor platelets, transfuse as needed  · Hold DVT ppx    A-fib [facility])  Assessment & Plan  · New onset episode 9/2, received digoxin 500mcg x 1, followed by 250 6 h later, On 9/3 Digoxin 62.5 daily  · Iodine allergy, unable to use amiodarone  · Replete electrolytes as needed for goal Mag > 2.0, Phos >3.0, K >4.0  · Telemetry monitoring  · Unable to anticoagulate 2/2 thrombocytopenia  · Digoxin level within normal range        Volume overload  Assessment & Plan  Most likely multifactorial, multiple comorbidities requiring infusions, cardiogenic shock with hypotension and kidney hypoperfusion, poor nutritional status and low albumin  S/a + 14  On 9/4 pt received Lasix 40 + 20 IV, produced 2660  9/5 increased dose to Lasix 40 mg IV twice daily      Plan:  · Strict I's and O's  · Daily weights  · Lasix 40 mg IV twice daily    HFrEF (heart failure with reduced ejection fraction) ([facility])  Assessment & Plan  Wt Readings from Last 3 Encounters:   09/06/23 65.3 kg (143 lb 15.4 oz)   07/29/23 49.4 kg (108 lb 14.4 oz)   06/30/23 55.3 kg (122 lb)   · 8/27 CXR showed pulmonary congestion  · On PE soft tissue swelling, anasarca on imaging, + 15 l balance  · 8/29 S/p cardiac arrest with ROSC   · Repeat echo EF 15-20 %   · Beta blocker on hold due to hypotension.    · 8/30 Started on milrinone, nor epi,   · 9/1 DC NE, started on vasopressi, c/w milrinone  · 9/2 DC milrinone and vasopressin due to afib. Loaded with Digoxin, c/w digoxin 62.5  daily  · Lasix 40 mg IV twice daily    Anemia  Assessment & Plan  Recent Labs     09/04/23 2052 09/05/23 0346 09/06/23  0443   HGB 9.9* 10.1* 9.0*     8/31 Hgb noted to be 6.4. S/p 1 unit of PRBCs   Most recent hgb: 9.0  No signs or symptoms of GI bleed, bruisers over her knees bilaterally and upper extremities  DIC labs showed decreased fibrinogen but no fibrinogen split products, no schistocytes, LDH and haptoglobin normal  CT C/A/P no signs of internal bleeding  DVT doppler LE no DVT  · Monitor HGB, transfuse as needed    Acute liver failure  Assessment & Plan  · The patient had a slight transaminitis during admission but over the past few days has steadily risen and then doubled on 8/26 with his AST 2800, ALT 1114  · Severe elevation of INR, out of proportion to liver injury  · Multifactorial - She was found to have a slightly elevated tylenol level (that continues to normalize) which may be a contributing factor however, not thought to be the only issue (ischemic vs viral hepatitis)  · She has HF, which may contribute to hepatotoxicity  · Additionally she was on a few drugs (Plaquinel, wellbutrin and lamictal) which can all be associated with liver failure  · Plaquinel and Wellbutrin held  · Doppler studies were normal, no thrombus  · Hepatitis panel on 8.24 was positive for hep C antibodies otherwise negative  · EBV ab/ag positive  · HSV, and VZV panels pending  · Started on acyclovir until HSV can be ruled out  · GI is following- appreciate their assistance with her care  · S/p NAC and fomepizole, dc on  8/28 as per Medical Toxicology  · Supportive care  · Vitamin K supplementation  · She would not be a candidate for a liver transplant  Recent Labs     09/05/23 0346 09/06/23 0443   AST 34  34 36   ALT 42  42 37   ALKPHOS 72  72 70   TBILI 2.87*  2.87* 1.86*   BILIDIR 1.32* 0.84*     · LFTs now improving.  Likely in the setting of shock liver vs acetaminophen toxicity. Multiple wounds  Assessment & Plan  Multiple wounds and skin lesions back, lower and upper extremities  Bluish discoloration of the toes bilaterally  Multiple blisters on the bilateral feet  · Proceed with wound care recommendations    Hypoglycemia  Assessment & Plan  · Current hypoglycemia may be related to her liver failure  · S/p D10  · No hypoglycemic episodes noted today. Plan:  Q6 finger sticks   Pt started on D 10 20 cc /h due to borderline hypoglycemia       Hyperbilirubinemia  Assessment & Plan  Today total bilirubin 1.86 (improved since yesterday 2.97), direct bilirubin 0.84  Repeat LDH  Repeat haptoglobin  Right upper quadrant ultrasound is pending               ICU Core Measures     Vented Patient  VAP Bundle  VAP bundle ordered     A: Assess, Prevent, and Manage Pain · Has pain been assessed? Yes  · Need for changes to pain regimen? No   B: Both Spontaneous Awakening Trials (SATs) and Spontaneous Breathing Trials (SBTs) · Plan to perform spontaneous awakening trial today? No secondary to No multiple organ failure  · Plan to perform spontaneous breathing trial today? No secondary to Multiple organ failure  · Obvious barriers to extubation? Yes   C: Choice of Sedation · RASS Goal: -3 Moderate Sedation or 0 Alert and Calm  · Need for changes to sedation or analgesia regimen? No   D: Delirium · CAM-ICU: Negative   E: Early Mobility  · Plan for early mobility? No   F: Family Engagement · Plan for family engagement today? Yes       Antibiotic Review: We will follow final culture results    Review of Invasive Devices: Aftab Plan: Continue for accurate I/O monitoring for 48 hours        Prophylaxis:  VTE    Stress Ulcer  covered byomeprazole (PRILOSEC) suspension 2 mg/mL [[id number]]       Subjective   HPI/24hr events: No acute events overnight, patient is hemodynamically stable.   unable to assess due to altered mental status     Objective Vitals I/O      Most Recent Min/Max in 24hrs   Temp 98.6 °F (37 °C) Temp  Min: 96.4 °F (35.8 °C)  Max: 100.8 °F (38.2 °C)   Pulse 102 Pulse  Min: 92  Max: 120   Resp (!) 25 Resp  Min: 25  Max: 76   BP 96/54 BP  Min: 82/53  Max: 130/69   O2 Sat 98 % SpO2  Min: 91 %  Max: 99 %      Intake/Output Summary (Last 24 hours) at 9/6/2023 0823  Last data filed at 9/6/2023 0800  Gross per 24 hour   Intake 2159 ml   Output 2270 ml   Net -111 ml         Diet NPO; Sips with meds     Invasive Monitoring Physical exam   Arterial Line  [place] /54  No data recorded   MAP 78 mmHg  No data recorded     PA Catheter   Most Recent  Min/Max in 24hrs    PAP   No data recorded   CVP   No data recorded   CI    No data recorded   SVR   No data recorded           Neuro Invasive Monitoring   Most Recent  Min/Max in 24hrs    GCS 5  [place] Coma Scale Score  Min: 5  Max: 5    ICP     No data recorded    CPP (cuff)    No data recorded    CPP(carrington)    No data recorded   CVP   No data recorded     ECMO:   Most Recent Min/Max in 24hrs   Flow (LPM)   No data recorded   Speed (RPM)   No data recorded    Flow   No data recorded   FIO2        LVAD   Most Recent  Min/Max in 24hrs    Pump Flow (LPM)   No data recorded    Pump Speed (RPM)   No data recorded   Pump Power    No data recorded    Pulsatility Index   No data recorded    Physical Exam  Eyes:      Conjunctiva/sclera: Conjunctivae normal.   Skin:     Findings: Lesion and wound present. Comments: Multiple bruises on upper and lower extremities   HENT:      Head: Normocephalic and atraumatic. Nose: No congestion. Mouth/Throat:      Mouth: Mucous membranes are moist.   Cardiovascular:      Rate and Rhythm: Regular rhythm. Tachycardia present. Pulses: Normal pulses. Musculoskeletal:         General: Swelling present. Abdominal:      Palpations: Abdomen is soft. Comments: PEG tube in place   Constitutional:       Appearance: She is ill-appearing. Interventions: She is not intubated. Comments: Appears cachectic with soft tissue swelling and multiple wounds, hyperextended neck   Pulmonary:      Effort: No respiratory distress. She is not intubated.       Comments: Decreased air entry  Distant Rales in the bases  Course breathing sound consistent with mechanical ventilation  Vent settings: 16/400/6/60%    Neurological:      Comments: Patient is opening eyes when calling her name  Not following commands   Genitourinary/Anorectal:  Ugalde       Diagnostic Studies      EKG: Reviewed  Imaging: Right upper quadrant ultrasound is pending     Medications:  Scheduled PRN   bisacodyl, 10 mg, Daily  cefTRIAXone, 1,000 mg, Q24H  chlorhexidine, 15 mL, Q12H [address]  digoxin, 62.5 mcg, Daily  DULoxetine, 60 mg, BID  furosemide, 40 mg, BID (diuretic)  gabapentin, 100 mg, HS  glycerin-hypromellose-, 1 drop, TID  insulin lispro, 1-5 Units, Q6H KHARI  lamoTRIgine, 50 mg, HS  lidocaine, 1 patch, Daily  omeprazole (PRILOSEC) suspension 2 mg/mL, 20 mg, Daily  oxyCODONE, 5 mg, Q8H  polyethylene glycol, 17 g, Daily  potassium chloride, 40 mEq, Q2H  vancomycin, 1,250 mg, Q24H      fentanyl citrate (PF), 50 mcg, Q2H PRN       Continuous    dextrose, 20 mL/hr, Last Rate: 20 mL/hr (09/04/23 1835)         Labs:    CBC    Recent Labs     09/05/23 0346 09/06/23 0443   WBC 17.67* 16.87*   HGB 10.1* 9.0*   HCT 32.9* 29.8*   PLT 45* 48*     BMP    Recent Labs     09/05/23 0346 09/06/23 0443   SODIUM 149* 148*   K 3.8 3.4*    105   CO2 36* 39*   AGAP 5 4   BUN 46* 49*   CREATININE 0.79 0.75   CALCIUM 9.3 9.4       Coags    Recent Labs     09/05/23 0346   INR 1.32*        Additional Electrolytes  Recent Labs     09/04/23 2052   CAIONIZED 1.29          Blood Gas    No recent results  No recent results LFTs  Recent Labs     09/05/23 0346 09/06/23 0443   ALT 42  42 37   AST 34  34 36   ALKPHOS 72  72 70   ALB 2.4*  2.4* 2.2*   TBILI 2.87*  2.87* 1.86*       Infectious  Recent Labs     09/05/23  0346   PROCALCITONI 0.68*     Glucose  Recent Labs     09/05/23  0346 09/06/23  0443   GLUC 122 107               Critical Care Time Delivered: Upon my evaluation, this patient had a high probability of imminent or life-threatening deterioration due to Multiple organ failure, which required my direct attention, intervention, and personal management. I have personally provided 30 minutes of critical care time, exclusive of procedures, teaching, family meetings, and any prior time recorded by providers other than myself.      Charles Camejo MD

## 2023-09-06 NOTE — PLAN OF CARE
Problem: Potential for Falls  Goal: Patient will remain free of falls  Description: INTERVENTIONS:  - Educate patient/family on patient safety including physical limitations  - Instruct patient to call for assistance with activity   - Consult OT/PT to assist with strengthening/mobility   - Keep Call bell within reach  - Keep bed low and locked with side rails adjusted as appropriate  - Keep care items and personal belongings within reach  - Initiate and maintain comfort rounds  - Make Fall Risk Sign visible to staff  - Offer Toileting every 2 Hours, in advance of need  - Initiate/Maintain bed/chair alarm  - Obtain necessary fall risk management equipment: bed/chair alarm, call bell, gripper socks, walker, bedside commode  - Apply yellow socks and bracelet for high fall risk patients  - Consider moving patient to room near nurses station  Outcome: Progressing     Problem: MOBILITY - ADULT  Goal: Maintain or return to baseline ADL function  Description: INTERVENTIONS:  - Educate patient/family on patient safety including physical limitations  - Instruct patient to call for assistance with activity   - Consult OT/PT to assist with strengthening/mobility   - Keep Call bell within reach  - Keep bed low and locked with side rails adjusted as appropriate  - Keep care items and personal belongings within reach  - Initiate and maintain comfort rounds  - Make Fall Risk Sign visible to staff  - Offer Toileting every 2 Hours, in advance of need  - Initiate/Maintain bed/chair alarm  - Obtain necessary fall risk management equipment: bed/chair alarm, call bell, gripper socks, walker, bedside commode  - Apply yellow socks and bracelet for high fall risk patients  - Consider moving patient to room near nurses station  Outcome: Progressing  Goal: Maintains/Returns to pre admission functional level  Description: INTERVENTIONS:  - Perform BMAT or MOVE assessment daily.   - Set and communicate daily mobility goal to care team and patient. - Collaborate with rehabilitation services on mobility goals if consulted  - Reposition patient every 2 hours.   - Dangle patient 3 times a day  - Stand patient 3 times a day  - Ambulate patient 3 times a day  - Out of bed to chair 3 times a day   - Out of bed for meals  - Out of bed for toileting  - Record patient progress and toleration of activity level   Outcome: Progressing     Problem: PAIN - ADULT  Goal: Verbalizes/displays adequate comfort level or baseline comfort level  Description: Interventions:  - Encourage patient to monitor pain and request assistance  - Assess pain using appropriate pain scale  - Administer analgesics based on type and severity of pain and evaluate response  - Implement non-pharmacological measures as appropriate and evaluate response  - Consider cultural and social influences on pain and pain management  - Notify physician/advanced practitioner if interventions unsuccessful or patient reports new pain  Outcome: Progressing     Problem: INFECTION - ADULT  Goal: Absence or prevention of progression during hospitalization  Description: INTERVENTIONS:  - Assess and monitor for signs and symptoms of infection  - Monitor lab/diagnostic results  - Monitor all insertion sites, i.e. indwelling lines, tubes, and drains  - Monitor endotracheal if appropriate and nasal secretions for changes in amount and color  - [place] appropriate cooling/warming therapies per order  - Administer medications as ordered  - Instruct and encourage patient and family to use good hand hygiene technique  - Identify and instruct in appropriate isolation precautions (contact isolation for CRE) for identified infection/condition  Outcome: Progressing     Problem: SAFETY ADULT  Goal: Patient will remain free of falls  Description: INTERVENTIONS:  - Educate patient/family on patient safety including physical limitations  - Instruct patient to call for assistance with activity   - Consult OT/PT to assist with strengthening/mobility   - Keep Call bell within reach  - Keep bed low and locked with side rails adjusted as appropriate  - Keep care items and personal belongings within reach  - Initiate and maintain comfort rounds  - Make Fall Risk Sign visible to staff  - Offer Toileting every 2 Hours, in advance of need  - Initiate/Maintain bed/chair alarm  - Obtain necessary fall risk management equipment: bed/chair alarm, call bell, gripper socks, walker, bedside commode  - Apply yellow socks and bracelet for high fall risk patients  - Consider moving patient to room near nurses station  Outcome: Progressing  Goal: Maintain or return to baseline ADL function  Description: INTERVENTIONS:  - Educate patient/family on patient safety including physical limitations  - Instruct patient to call for assistance with activity   - Consult OT/PT to assist with strengthening/mobility   - Keep Call bell within reach  - Keep bed low and locked with side rails adjusted as appropriate  - Keep care items and personal belongings within reach  - Initiate and maintain comfort rounds  - Make Fall Risk Sign visible to staff  - Offer Toileting every 2 Hours, in advance of need  - Initiate/Maintain bed/chair alarm  - Obtain necessary fall risk management equipment: bed/chair alarm, call bell, gripper socks, walker, bedside commode  - Apply yellow socks and bracelet for high fall risk patients  - Consider moving patient to room near nurses station  Outcome: Progressing  Goal: Maintains/Returns to pre admission functional level  Description: INTERVENTIONS:  - Perform BMAT or MOVE assessment daily.   - Set and communicate daily mobility goal to care team and patient. - Collaborate with rehabilitation services on mobility goals if consulted  - Reposition patient every 2 hours.   - Dangle patient 3 times a day  - Stand patient 3 times a day  - Ambulate patient 3 times a day  - Out of bed to chair 3 times a day   - Out of bed for meals  - Out of bed for toileting  - Record patient progress and toleration of activity level   Outcome: Progressing     Problem: DISCHARGE PLANNING  Goal: Discharge to home or other facility with appropriate resources  Description: INTERVENTIONS:  - Identify barriers to discharge w/patient  - Arrange for needed discharge resources and transportation as appropriate  - Identify discharge learning needs   - Refer to Case Management Department for coordinating discharge planning if the patient needs post-hospital services based on physician/advanced practitioner order or complex needs related to functional status, cognitive ability, or social support system  Outcome: Progressing     Problem: Knowledge Deficit  Goal: Patient/family/caregiver demonstrates understanding of disease process, treatment plan, medications, and discharge instructions  Description: Complete learning assessment and assess knowledge base.   Interventions:  - Provide teaching at level of understanding  - Provide teaching via preferred learning methods  Outcome: Progressing     Problem: Prexisting or High Potential for Compromised Skin Integrity  Goal: Skin integrity is maintained or improved  Description: INTERVENTIONS:  - Identify patients at risk for skin breakdown  - Assess and monitor skin integrity  - Assess and monitor nutrition and hydration status  - Monitor labs   - Assess for incontinence   - Turn and reposition patient  - Assist with mobility/ambulation  - Relieve pressure over bony prominences  - Avoid friction and shearing  - Provide appropriate hygiene as needed including keeping skin clean and dry  - Evaluate need for skin moisturizer/barrier cream  - Collaborate with interdisciplinary team   - Patient/family teaching  - Consider wound care consult   Outcome: Progressing     Problem: Nutrition/Hydration-ADULT  Goal: Nutrient/Hydration intake appropriate for improving, restoring or maintaining nutritional needs  Description: Monitor and assess patient's nutrition/hydration status for malnutrition. Collaborate with interdisciplinary team and initiate plan and interventions as ordered. Monitor patient's weight and dietary intake as ordered or per policy. Utilize nutrition screening tool and intervene as necessary. Determine patient's food preferences and provide high-protein, high-caloric foods as appropriate.      INTERVENTIONS:  - Monitor oral intake, urinary output, labs, and treatment plans  - Assess nutrition and hydration status and recommend course of action  - Evaluate amount of meals eaten  - Do not leave patient alone while eating due to aspiration risk  - Assist patient with eating if necessary   - Allow adequate time for meals  - Recommend/ encourage appropriate diets, oral nutritional supplements, and vitamin/mineral supplements  - Order, calculate, and assess calorie counts as needed  - Assess need for intravenous fluids  - Provide specific nutrition/hydration education as appropriate  - Include patient in decisions related to nutrition  Outcome: Progressing     Problem: RESPIRATORY - ADULT  Goal: Achieves optimal ventilation and oxygenation  Description: INTERVENTIONS:  - Assess for changes in respiratory status  - Assess for changes in mentation and behavior  - Position to facilitate oxygenation and minimize respiratory effort  - Oxygen administered by appropriate delivery if ordered   - Encourage broncho-pulmonary hygiene including cough, deep breathe, Incentive Spirometry, flutter valve  - Assess the need for suctioning and aspirate as needed  - Assess and instruct to report SOB or any respiratory difficulty  - Respiratory Therapy support as indicated  Outcome: Progressing     Problem: GENITOURINARY - ADULT  Goal: Maintains or returns to baseline urinary function  Description: INTERVENTIONS:  - Assess urinary function  - Encourage oral fluids to ensure adequate hydration if ordered  - Administer IV fluids as ordered to ensure adequate hydration  - Administer ordered medications as needed  - Offer frequent toileting  - Follow urinary retention protocol if ordered  Outcome: Progressing  Goal: Urinary catheter remains patent  Description: INTERVENTIONS:  - Assess patency of urinary catheter  - Follow guidelines for intermittent irrigation of non-functioning urinary catheter  Outcome: Progressing     Problem: NEUROSENSORY - ADULT  Goal: Achieves stable or improved neurological status  Description: INTERVENTIONS  - Monitor and report changes in neurological status  - Monitor vital signs such as temperature, blood pressure, glucose, and any other labs ordered   - Initiate measures to prevent increased intracranial pressure  - Monitor for seizure activity and implement precautions if appropriate      Outcome: Progressing  Goal: Achieves maximal functionality and self care  Description: INTERVENTIONS  - Monitor swallowing and airway patency with patient fatigue and changes in neurological status  - Encourage and assist patient to increase activity and self care.    - Encourage visually impaired, hearing impaired and aphasic patients to use assistive/communication devices  Outcome: Progressing     Problem: GASTROINTESTINAL - ADULT  Goal: Maintains adequate nutritional intake  Description: INTERVENTIONS:  - Monitor percentage of each meal consumed  - Identify factors contributing to decreased intake, treat as appropriate  - Assist with meals as needed  - Monitor I&O, weight, and lab values if indicated  - Obtain nutrition services referral as needed  Outcome: Progressing  Goal: Establish and maintain optimal ostomy function  Description: INTERVENTIONS:  - Assess bowel function  - Encourage oral fluids to ensure adequate hydration  - Administer IV fluids if ordered to ensure adequate hydration   - Administer ordered medications as needed  - Encourage mobilization and activity  - Nutrition services referral to assist patient with appropriate food choices  - Assess stoma site  - Consider wound care consult   Outcome: Progressing     Problem: METABOLIC, FLUID AND ELECTROLYTES - ADULT  Goal: Electrolytes maintained within normal limits  Description: INTERVENTIONS:  - Monitor labs and assess patient for signs and symptoms of electrolyte imbalances  - Administer electrolyte replacement as ordered  - Monitor response to electrolyte replacements, including repeat lab results as appropriate  - Instruct patient on fluid and nutrition as appropriate  Outcome: Progressing  Goal: Fluid balance maintained  Description: INTERVENTIONS:  - Monitor labs   - Monitor I/O and WT  - Instruct patient on fluid and nutrition as appropriate  - Assess for signs & symptoms of volume excess or deficit  Outcome: Progressing  Goal: Glucose maintained within target range  Description: INTERVENTIONS:  - Monitor Blood Glucose as ordered  - Assess for signs and symptoms of hyperglycemia and hypoglycemia  - Administer ordered medications to maintain glucose within target range  - Assess nutritional intake and initiate nutrition service referral as needed  Outcome: Progressing     Problem: SKIN/TISSUE INTEGRITY - ADULT  Goal: Skin Integrity remains intact(Skin Breakdown Prevention)  Description: Assess:  -Assess extremities for adequate circulation and sensation     Bed Management:  -Have minimal linens on bed & keep smooth, unwrinkled  -Change linens as needed when moist or perspiring    Toileting:  -Offer bedside commode    Skin Care:  -Avoid use of baby powder, tape, friction and shearing, hot water or constrictive clothing    Outcome: Progressing  Goal: Incision(s), wounds(s) or drain site(s) healing without S/S of infection  Description: INTERVENTIONS  - Assess and document dressing, incision, wound bed, drain sites and surrounding tissue  - Provide patient and family education  Outcome: Progressing     Problem: HEMATOLOGIC - ADULT  Goal: Maintains hematologic stability  Description: INTERVENTIONS  - Assess for signs and symptoms of bleeding or hemorrhage  - Monitor labs  - Administer supportive blood products/factors as ordered and appropriate  Outcome: Progressing

## 2023-09-06 NOTE — ASSESSMENT & PLAN NOTE
· Platelets 73 today  · No obvious source of bleeding, patient did not receive heparin  · CT abdomen pelvis showed no retroperitoneal hematoma  · DIC panel was done on 80/31s,: fibrin products more than 10, plasminogen low 20, Antithrombin low 19, APTT 40, INR 3.57, D-dimer elevated 8.49, fibrinogen low 169, platelets 41  · Hemolysis panel was equivocal, after globin normal, LDH borderline elevated  275, repeat test - was normal  Immature platelet fraction 34.3%, C3-C4 low, ANABELLA is negative, Repeat LDH wnl, double-stranded antibody <1  Plan:  · Repeat haptoglobin is pending  · Follow autoimmune work-up (histone antibody, different diagnosis 3 antibody, anti myeloperoxidase antibody, anti-Citrulline aided peptide, -in process)  · Monitor for active bleeding  · Monitor platelets, transfuse as needed  · Hold DVT ppx

## 2023-09-06 NOTE — ASSESSMENT & PLAN NOTE
· Platelets 48 today  · No obvious source of bleeding, patient did not receive heparin  · CT abdomen pelvis showed no retroperitoneal hematoma  · DIC panel was done on 80/31s,: fibrin products more than 10, plasminogen low 20, Antithrombin low 19, APTT 40, INR 3.57, D-dimer elevated 8.49, fibrinogen low 169, platelets 41  · Hemolysis panel was equivocal, after globin normal, LDH borderline elevated  275, repeat test - was normal  Plan:  · Immature platelet fraction 27.0%, C3-C4 low, ANABELLA is negative  · Repeat LDH and haptoglobin  · Follow autoimmune work-up (histone antibody, different diagnosis 3 antibody, anti myeloperoxidase antibody, anti-Citrulline aided peptide, until double-stranded antibody-in process)  · Monitor for active bleeding  · Monitor platelets, transfuse as needed  · Hold DVT ppx

## 2023-09-06 NOTE — PROGRESS NOTES
09/06/23 1200   Clinical Encounter Type   Visited With Family   Routine Visit Follow-up   Continue Visiting Yes

## 2023-09-06 NOTE — ASSESSMENT & PLAN NOTE
Recent Labs     09/04/23 2052 09/05/23 0346 09/06/23  0443   HGB 9.9* 10.1* 9.0*     8/31 Hgb noted to be 6.4.  S/p 1 unit of PRBCs   Most recent hgb: 9.0  No signs or symptoms of GI bleed, bruisers over her knees bilaterally and upper extremities  DIC labs showed decreased fibrinogen but no fibrinogen split products, no schistocytes, LDH and haptoglobin normal  CT C/A/P no signs of internal bleeding  DVT doppler LE no DVT  · Monitor HGB, transfuse as needed

## 2023-09-06 NOTE — PROGRESS NOTES
Pastoral Care Progress Note    2023  Patient: Carter De Anda : 1955  Admission Date & Time: 8/10/2023 1448  MRN: [id number] CSN: [id number]     continue to support daughter who comes in daily to be with mother. Daughter appreciates support and visits will continue to follow patient and support daughter.

## 2023-09-06 NOTE — ASSESSMENT & PLAN NOTE
Recent Labs     09/04/23  0511 09/05/23  0346 09/06/23  0443   CREATININE 0.81 0.79 0.75   EGFR 74 76 81     Estimated Creatinine Clearance: 61.1 mL/min (by C-G formula based on SCr of 0.75 mg/dL). Suspect this is likely in the setting of poor perfusion and cardiorenal syndrome. Plan:  Avoid nephrotoxic agents. Continue monitoring renal indices. negative...

## 2023-09-06 NOTE — ASSESSMENT & PLAN NOTE
Wt Readings from Last 3 Encounters:   09/06/23 65.3 kg (143 lb 15.4 oz)   07/29/23 49.4 kg (108 lb 14.4 oz)   06/30/23 55.3 kg (122 lb)   · 8/27 CXR showed pulmonary congestion  · On PE soft tissue swelling, anasarca on imaging, + 15 l balance  · 8/29 S/p cardiac arrest with ROSC   · Repeat echo EF 15-20 %   · Beta blocker on hold due to hypotension. · 8/30 Started on milrinone, nor epi,   · 9/1 DC NE, started on vasopressi, c/w milrinone  · 9/2 DC milrinone and vasopressin due to afib.  Loaded with Digoxin  · c/w digoxin 62.5  daily  · 9/6 switched Lasix ggt with good response  · Repeat echo to evaluate if EF improved given good response to lasix ggt

## 2023-09-06 NOTE — ASSESSMENT & PLAN NOTE
Most likely multifactorial, multiple comorbidities requiring infusions, cardiogenic shock with hypotension and kidney hypoperfusion, poor nutritional status and low albumin  S/a + 14  On 9/4 pt received Lasix 40 + 20 IV (produced 2660 cc of urine)  9/5 increased dose to Lasix 40 mg IV twice daily  9/6 pt was started on lasix drip (produced 4150 cc of urine)      Plan:  · Strict I's and O's  · Daily weights  · C/w Lasix ggt

## 2023-09-06 NOTE — ASSESSMENT & PLAN NOTE
· Current hypoglycemia may be related to her liver failure  · S/p D10  · No hypoglycemic episodes noted today.     Plan:  Q6 finger sticks   Pt started on D 10 20 cc /h due to borderline hypoglycemia

## 2023-09-07 NOTE — ASSESSMENT & PLAN NOTE
· Patient had right MCA stroke, s/p mechanical thrombectomy earlier this year  · Although the patient moves all the limbs, there is residual weakness in the left upper and lower limb  · CT head on August 8 shows No acute intracranial abnormality.  Repeat [facility] 8/31 - no acute changes    Plan:  · Will consider to restart Eliquis given improvement in liver function  · thrombocytopenia improving, will reconsider DVT ppx  · Frequent neurochecks

## 2023-09-07 NOTE — ASSESSMENT & PLAN NOTE
Patient has a history of low impact fall that resulted in multiple T-spine fractures  Currently pain controlled by morphine    Plan:  · C/w gabapentin  · She is on narcotics chronically, but has been hospitalized for about 2 weeks with variable use  · PRN fentanyl   · Oxycodone 5 mg Q8

## 2023-09-07 NOTE — ASSESSMENT & PLAN NOTE
· Noted to be more altered on 8/26 with eye opening to name but not verbal, not following commands  · 8/27 Mental status wavering this morning  · Ammonia level was normal blood sugar was normal head cT without acute abnormality  · Liver enzymes trending down, no asterixis on exam  AMS most likely multifactorial due to multiple organ failure    Plan:  · No significatn change in mental status since yesterday.  Pt is not on sedating medications, randomly opening eyes, not able to follow comands  · Repeat CTH is pending

## 2023-09-07 NOTE — ASSESSMENT & PLAN NOTE
· Most likely contributing to her respiratory status initially  · peg tube placed 8/17  · Continue tube feeds  · Nutrition is following, formula adjusted as per nutrrtion recommendations

## 2023-09-07 NOTE — PLAN OF CARE
Problem: Nutrition/Hydration-ADULT  Goal: Nutrient/Hydration intake appropriate for improving, restoring or maintaining nutritional needs  Description: Monitor and assess patient's nutrition/hydration status for malnutrition. Collaborate with interdisciplinary team and initiate plan and interventions as ordered. Monitor patient's weight and dietary intake as ordered or per policy. Utilize nutrition screening tool and intervene as necessary. Determine patient's food preferences and provide high-protein, high-caloric foods as appropriate.      INTERVENTIONS:  - Monitor oral intake, urinary output, labs, and treatment plans  - Assess nutrition and hydration status and recommend course of action  - Evaluate amount of meals eaten  - Do not leave patient alone while eating due to aspiration risk  - Assist patient with eating if necessary   - Allow adequate time for meals  - Recommend/ encourage appropriate diets, oral nutritional supplements, and vitamin/mineral supplements  - Order, calculate, and assess calorie counts as needed  - Assess need for intravenous fluids  - Provide specific nutrition/hydration education as appropriate  - Include patient in decisions related to nutrition  Outcome: Progressing     Problem: RESPIRATORY - ADULT  Goal: Achieves optimal ventilation and oxygenation  Description: INTERVENTIONS:  - Assess for changes in respiratory status  - Assess for changes in mentation and behavior  - Position to facilitate oxygenation and minimize respiratory effort  - Oxygen administered by appropriate delivery if ordered   - Encourage broncho-pulmonary hygiene including cough, deep breathe, Incentive Spirometry, flutter valve  - Assess the need for suctioning and aspirate as needed  - Assess and instruct to report SOB or any respiratory difficulty  - Respiratory Therapy support as indicated  Outcome: Progressing     Problem: HEMATOLOGIC - ADULT  Goal: Maintains hematologic stability  Description: INTERVENTIONS  - Assess for signs and symptoms of bleeding or hemorrhage  - Monitor labs  - Administer supportive blood products/factors as ordered and appropriate  Outcome: Progressing     Problem: Potential for Falls  Goal: Patient will remain free of falls  Description: INTERVENTIONS:  - Educate patient/family on patient safety including physical limitations  - Instruct patient to call for assistance with activity   - Consult OT/PT to assist with strengthening/mobility   - Keep Call bell within reach  - Keep bed low and locked with side rails adjusted as appropriate  - Keep care items and personal belongings within reach  - Initiate and maintain comfort rounds  - Make Fall Risk Sign visible to staff  - Offer Toileting every 2 Hours, in advance of need  - Initiate/Maintain bed/chair alarm  - Obtain necessary fall risk management equipment: bed/chair alarm, call bell, gripper socks, walker, bedside commode  - Apply yellow socks and bracelet for high fall risk patients  - Consider moving patient to room near nurses station  Outcome: Not Progressing     Problem: MOBILITY - ADULT  Goal: Maintain or return to baseline ADL function  Description: INTERVENTIONS:  - Educate patient/family on patient safety including physical limitations  - Instruct patient to call for assistance with activity   - Consult OT/PT to assist with strengthening/mobility   - Keep Call bell within reach  - Keep bed low and locked with side rails adjusted as appropriate  - Keep care items and personal belongings within reach  - Initiate and maintain comfort rounds  - Make Fall Risk Sign visible to staff  - Offer Toileting every 2 Hours, in advance of need  - Initiate/Maintain bed/chair alarm  - Obtain necessary fall risk management equipment: bed/chair alarm, call bell, gripper socks, walker, bedside commode  - Apply yellow socks and bracelet for high fall risk patients  - Consider moving patient to room near nurses station  Outcome: Not Progressing  Goal: Maintains/Returns to pre admission functional level  Description: INTERVENTIONS:  - Perform BMAT or MOVE assessment daily.   - Set and communicate daily mobility goal to care team and patient. - Collaborate with rehabilitation services on mobility goals if consulted  - Reposition patient every 2 hours.   - Dangle patient 3 times a day  - Stand patient 3 times a day  - Ambulate patient 3 times a day  - Out of bed to chair 3 times a day   - Out of bed for meals  - Out of bed for toileting  - Record patient progress and toleration of activity level   Outcome: Not Progressing     Problem: PAIN - ADULT  Goal: Verbalizes/displays adequate comfort level or baseline comfort level  Description: Interventions:  - Encourage patient to monitor pain and request assistance  - Assess pain using appropriate pain scale  - Administer analgesics based on type and severity of pain and evaluate response  - Implement non-pharmacological measures as appropriate and evaluate response  - Consider cultural and social influences on pain and pain management  - Notify physician/advanced practitioner if interventions unsuccessful or patient reports new pain  Outcome: Not Progressing     Problem: INFECTION - ADULT  Goal: Absence or prevention of progression during hospitalization  Description: INTERVENTIONS:  - Assess and monitor for signs and symptoms of infection  - Monitor lab/diagnostic results  - Monitor all insertion sites, i.e. indwelling lines, tubes, and drains  - Monitor endotracheal if appropriate and nasal secretions for changes in amount and color  - Grahn appropriate cooling/warming therapies per order  - Administer medications as ordered  - Instruct and encourage patient and family to use good hand hygiene technique  - Identify and instruct in appropriate isolation precautions (contact isolation for CRE) for identified infection/condition  Outcome: Not Progressing     Problem: SAFETY ADULT  Goal: Patient will remain free of falls  Description: INTERVENTIONS:  - Educate patient/family on patient safety including physical limitations  - Instruct patient to call for assistance with activity   - Consult OT/PT to assist with strengthening/mobility   - Keep Call bell within reach  - Keep bed low and locked with side rails adjusted as appropriate  - Keep care items and personal belongings within reach  - Initiate and maintain comfort rounds  - Make Fall Risk Sign visible to staff  - Offer Toileting every 2 Hours, in advance of need  - Initiate/Maintain bed/chair alarm  - Obtain necessary fall risk management equipment: bed/chair alarm, call bell, gripper socks, walker, bedside commode  - Apply yellow socks and bracelet for high fall risk patients  - Consider moving patient to room near nurses station  Outcome: Not Progressing  Goal: Maintain or return to baseline ADL function  Description: INTERVENTIONS:  - Educate patient/family on patient safety including physical limitations  - Instruct patient to call for assistance with activity   - Consult OT/PT to assist with strengthening/mobility   - Keep Call bell within reach  - Keep bed low and locked with side rails adjusted as appropriate  - Keep care items and personal belongings within reach  - Initiate and maintain comfort rounds  - Make Fall Risk Sign visible to staff  - Offer Toileting every 2 Hours, in advance of need  - Initiate/Maintain bed/chair alarm  - Obtain necessary fall risk management equipment: bed/chair alarm, call bell, gripper socks, walker, bedside commode  - Apply yellow socks and bracelet for high fall risk patients  - Consider moving patient to room near nurses station  Outcome: Not Progressing  Goal: Maintains/Returns to pre admission functional level  Description: INTERVENTIONS:  - Perform BMAT or MOVE assessment daily.   - Set and communicate daily mobility goal to care team and patient.    - Collaborate with rehabilitation services on mobility goals if consulted  - Reposition patient every 2 hours.   - Dangle patient 3 times a day  - Stand patient 3 times a day  - Ambulate patient 3 times a day  - Out of bed to chair 3 times a day   - Out of bed for meals  - Out of bed for toileting  - Record patient progress and toleration of activity level   Outcome: Not Progressing     Problem: Prexisting or High Potential for Compromised Skin Integrity  Goal: Skin integrity is maintained or improved  Description: INTERVENTIONS:  - Identify patients at risk for skin breakdown  - Assess and monitor skin integrity  - Assess and monitor nutrition and hydration status  - Monitor labs   - Assess for incontinence   - Turn and reposition patient  - Assist with mobility/ambulation  - Relieve pressure over bony prominences  - Avoid friction and shearing  - Provide appropriate hygiene as needed including keeping skin clean and dry  - Evaluate need for skin moisturizer/barrier cream  - Collaborate with interdisciplinary team   - Patient/family teaching  - Consider wound care consult   Outcome: Not Progressing     Problem: GENITOURINARY - ADULT  Goal: Maintains or returns to baseline urinary function  Description: INTERVENTIONS:  - Assess urinary function  - Encourage oral fluids to ensure adequate hydration if ordered  - Administer IV fluids as ordered to ensure adequate hydration  - Administer ordered medications as needed  - Offer frequent toileting  - Follow urinary retention protocol if ordered  Outcome: Not Progressing  Goal: Urinary catheter remains patent  Description: INTERVENTIONS:  - Assess patency of urinary catheter  - Follow guidelines for intermittent irrigation of non-functioning urinary catheter  Outcome: Not Progressing     Problem: NEUROSENSORY - ADULT  Goal: Achieves stable or improved neurological status  Description: INTERVENTIONS  - Monitor and report changes in neurological status  - Monitor vital signs such as temperature, blood pressure, glucose, and any other labs ordered   - Initiate measures to prevent increased intracranial pressure  - Monitor for seizure activity and implement precautions if appropriate      Outcome: Not Progressing  Goal: Achieves maximal functionality and self care  Description: INTERVENTIONS  - Monitor swallowing and airway patency with patient fatigue and changes in neurological status  - Encourage and assist patient to increase activity and self care.    - Encourage visually impaired, hearing impaired and aphasic patients to use assistive/communication devices  Outcome: Not Progressing     Problem: SKIN/TISSUE INTEGRITY - ADULT  Goal: Skin Integrity remains intact(Skin Breakdown Prevention)  Description: Assess:  -Perform Paul assessment   -Clean and moisturize skin  -Inspect skin when repositioning, toileting, and assisting with ADLS  -Assess under medical devices  -Assess extremities for adequate circulation and sensation     Bed Management:  -Have minimal linens on bed & keep smooth, unwrinkled  -Change linens as needed when moist or perspiring  -Avoid sitting or lying in one position while in bed  -Keep HOB at 30 degrees     Toileting:  -Offer bedside commode  -Assess for incontinence  -Use incontinent care products after each incontinent episode     Activity:  -Mobilize patient  -Encourage activity and walks on unit  -Encourage or provide ROM exercises   -Turn and reposition patient   -Use appropriate equipment to lift or move patient in bed  -Instruct/ Assist with weight shifting when out of bed in chair  -Consider limitation of chair time     Skin Care:  -Avoid use of baby powder, tape, friction and shearing, hot water or constrictive clothing  -Relieve pressure over bony prominences   -Do not massage red bony areas    Next Steps:  -Teach patient strategies to minimize risks  -Consider consults to  interdisciplinary teams   Outcome: Not Progressing  Goal: Incision(s), wounds(s) or drain site(s) healing without S/S of infection  Description: INTERVENTIONS  - Assess and document dressing, incision, wound bed, drain sites and surrounding tissue  - Provide patient and family education  - Perform skin care/dressing changes  Outcome: Not Progressing

## 2023-09-07 NOTE — PROGRESS NOTES
[address] [facility]  Progress Note  Name: Wendy Helm  MRN: [id number]  Unit/Bed#: ICU 04 I Date of Admission: 8/10/2023   Date of Service: 9/7/2023 I Hospital Day: 29    Assessment/Plan   * Acute respiratory failure with hypoxia [facility])  Assessment & Plan  · Patient was brought to the ED after being in altered mental status, requiring more oxygen. · History of multiple episodes of vomiting at night prior to admission  · Initially admitted with aspiration pneumonia, treated with a course of antibiotics and improved  · Remained on oxygen therapy, but developed a change in MS and required transfer back to the ICU  · She was noted to have an increase WOB and was placed on BIPAP  · This current episode does not appear to be related to an acute lung issue but more likely related to acute hepatic failure  · Patient is status postcardiac arrest on 8/30, was intubated   · CT H - no change from prior  · 9/4 proCal is trending up, pt spiked fever, pt met SIRS criteria, repeat CXR showed opacities, pt started on Vanc/Meropenem, transitioned to CFX/Vanc. Central and femoral lines removed, Ugalde exchanged. We'll follow Bx and consider to exchange midline. Plan:  · Vent : 16/400/6/60%   · Goal SpO2 > 92%  · Pt had trial on pressure support mode on 9/6 and did well, if mental status improving will consider extubation      Cardiac arrest [facility])  Assessment & Plan  Patient is a 58-year-old female  who was status postcardiac arrest with ROSC,   · 8/29 It appeared patient became bradycardic and agonal respiration, patient arrested and CPR and ACLS protocol -CPR was started received epinephrine x 3, mag sulfate, bicarb x 2 , fibrillation, serum chloride dextrose presents from 927 to 939  · Patient now is a DNR level 2   · Milrinone and NE started on 8/29, Vasopressin replaced NE on 8/31.  Milrinone and vasopressin  DC'd on 9/2  · Midodrine added for borderline BP on 9/6          Thrombocytopenia [facility])  Assessment & Plan  · Platelets 73 today  · No obvious source of bleeding, patient did not receive heparin  · CT abdomen pelvis showed no retroperitoneal hematoma  · DIC panel was done on 80/31s,: fibrin products more than 10, plasminogen low 20, Antithrombin low 19, APTT 40, INR 3.57, D-dimer elevated 8.49, fibrinogen low 169, platelets 41  · Hemolysis panel was equivocal, after globin normal, LDH borderline elevated  275, repeat test - was normal  Immature platelet fraction 24.6%, C3-C4 low, ANABELLA is negative, Repeat LDH wnl, double-stranded antibody <1  Plan:  · Repeat haptoglobin is pending  · Follow autoimmune work-up (histone antibody, different diagnosis 3 antibody, anti myeloperoxidase antibody, anti-Citrulline aided peptide, -in process)  · Monitor for active bleeding  · Monitor platelets, transfuse as needed  · Hold DVT ppx    A-fib [facility])  Assessment & Plan  · New onset episode 9/2, received digoxin 500mcg x 1, followed by 250 6 h later, On 9/3 Digoxin 62.5 daily  · Iodine allergy, unable to use amiodarone  · Replete electrolytes as needed for goal Mag > 2.0, Phos >3.0, K >4.0  · Telemetry monitoring  · Digoxin level at goal  · Will reconsider anticoagulation given improvement in plt and liver function        Volume overload  Assessment & Plan  Most likely multifactorial, multiple comorbidities requiring infusions, cardiogenic shock with hypotension and kidney hypoperfusion, poor nutritional status and low albumin  S/a + 14  On 9/4 pt received Lasix 40 + 20 IV (produced 2660 cc of urine)  9/5 increased dose to Lasix 40 mg IV twice daily  9/6 pt was started on lasix drip (produced 4150 cc of urine)      Plan:  · Strict I's and O's  · Daily weights  · C/w Lasix ggt    HFrEF (heart failure with reduced ejection fraction) ([address])  Assessment & Plan  Wt Readings from Last 3 Encounters:   09/06/23 65.3 kg (143 lb 15.4 oz)   07/29/23 49.4 kg (108 lb 14.4 oz)   06/30/23 55.3 kg (122 lb)   · 8/27 CXR showed pulmonary congestion  · On PE soft tissue swelling, anasarca on imaging, + 15 l balance  · 8/29 S/p cardiac arrest with ROSC   · Repeat echo EF 15-20 %   · Beta blocker on hold due to hypotension. · 8/30 Started on milrinone, nor epi,   · 9/1 DC NE, started on vasopressi, c/w milrinone  · 9/2 DC milrinone and vasopressin due to afib. Loaded with Digoxin  · c/w digoxin 62.5  daily  · 9/6 switched Lasix ggt with good response  · Repeat echo to evaluate if EF improved given good response to lasix ggt    Anemia  Assessment & Plan  Recent Labs     09/04/23 2052 09/05/23  0346 09/06/23  0443   HGB 9.9* 10.1* 9.0*     8/31 Hgb noted to be 6.4.  S/p 1 unit of PRBCs   Most recent hgb: 9.7  No signs or symptoms of GI bleed, bruisers over her knees bilaterally and upper extremities  DIC labs showed decreased fibrinogen but no fibrinogen split products, no schistocytes, LDH and haptoglobin normal  CT C/A/P no signs of internal bleeding  DVT doppler LE no DVT  · Monitor HGB, transfuse as needed    Acute liver failure  Assessment & Plan  · The patient had a slight transaminitis during admission but over the past few days has steadily risen and then doubled on 8/26 with his AST 2800, ALT 1114  · Severe elevation of INR, out of proportion to liver injury  · Multifactorial - She was found to have a slightly elevated tylenol level (that continues to normalize) which may be a contributing factor however, not thought to be the only issue (ischemic vs viral hepatitis)  · She has HF, which may contribute to hepatotoxicity  · Additionally she was on a few drugs (Plaquinel, wellbutrin and lamictal) which can all be associated with liver failure  · Plaquinel and Wellbutrin held  · Doppler studies were normal, no thrombus  · Hepatitis panel on 8.24 was positive for hep C antibodies otherwise negative  · EBV ab/ag positive  · HSV, and VZV panels pending  · S/p acyclovir until HSV can be ruled out  · GI is following- appreciate their assistance with her care  · S/p NAC and fomepizole, dc on  8/28 as per Medical Toxicology  · Supportive care  · Vitamin K supplementation  · She would not be a candidate for a liver transplant  Recent Labs     09/05/23  0346 09/06/23  0443   AST 34  34 36   ALT 42  42 37   ALKPHOS 72  72 70   TBILI 2.87*  2.87* 1.86*   BILIDIR 1.32* 0.84*     · LFTs trended to normal range. T Bili elevated, mixed picture with both direct and indirect bili elevation, improving    Multiple wounds  Assessment & Plan  Multiple wounds and skin lesions back, lower and upper extremities  Bluish discoloration of the toes bilaterally  Multiple blisters on the bilateral feet  · Proceed with wound care recommendations    Hypoglycemia  Assessment & Plan  · Current hypoglycemia may be related to her liver failure  · S/p D10  · No hypoglycemic episodes noted today. Plan:  Q6 finger sticks   Currently BS on a higher range given steroids was added to management  ISS      Hyperbilirubinemia  Assessment & Plan  9/6 total bilirubin 1.86 (improved since yesterday 2.97), direct bilirubin 0.84  Repeat LDH wnl  Repeat haptoglobin in process  Right upper quadrant ultrasound: no stone, no obstruction, CBD not dilated        SIRS (systemic inflammatory response syndrome) ([address])  Assessment & Plan  9/4 proCal is trending up, pt spiked fever, pt met SIRS criteria, repeat CXR showed opacities, pt started on Vanc/Meropenem. Central and femoral lines removed, Ugalde exchanged.    Pt spiked fever on 9/7 overnight, wbc slowly trending down  · Antibiotic narrowed to ceftriaxone and vancomycin  · Urine culture is negative,  Blood culture - No growth in 48 hours  · Abx narrowed to Ceftriaxone 4/5        Closed T12 fracture ([address])  Assessment & Plan  Patient has a history of low impact fall that resulted in multiple T-spine fractures  Currently pain controlled by morphine    Plan:  · C/w gabapentin  · She is on narcotics chronically, but has been hospitalized for about 2 weeks with variable use  · PRN fentanyl   · Oxycodone 5 mg Q8      Dysphagia  Assessment & Plan  · Most likely contributing to her respiratory status initially  · peg tube placed 8/17  · Continue tube feeds  · Nutrition is following, formula adjusted as per nutrrtion recommendations      Altered mental status  Assessment & Plan  · Noted to be more altered on 8/26 with eye opening to name but not verbal, not following commands  · 8/27 Mental status wavering this morning  · Ammonia level was normal blood sugar was normal head cT without acute abnormality  · Liver enzymes trending down, no asterixis on exam  AMS most likely multifactorial due to multiple organ failure    Plan:  · No significatn change in mental status since yesterday. Pt is not on sedating medications, randomly opening eyes, not able to follow comands  · Repeat CTH is pending    History of stroke  Assessment & Plan  · Patient had right MCA stroke, s/p mechanical thrombectomy earlier this year  · Although the patient moves all the limbs, there is residual weakness in the left upper and lower limb  · CT head on August 8 shows No acute intracranial abnormality. Repeat [facility] 8/31 - no acute changes    Plan:  · Will consider to restart Eliquis given improvement in liver function  · thrombocytopenia improving, will reconsider DVT ppx  · Frequent neurochecks      Hypercalcemia  Assessment & Plan  · 2/2 hyperparathyroidism  · follows with endocrine  · sensipar on hold 2/2 unable to crush    LAILA (acute kidney injury) [facility])  Assessment & Plan  Recent Labs     09/05/23  0346 09/06/23  0443 09/07/23  0453   CREATININE 0.79 0.75 0.83   EGFR 76 81 72     Estimated Creatinine Clearance: 55.2 mL/min (by C-G formula based on SCr of 0.83 mg/dL). Suspect this is likely in the setting of poor perfusion and cardiorenal syndrome. Kidney function at baseline    Plan:  Avoid nephrotoxic agents. Continue monitoring renal indices.      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 Lower dose started 2/2 sedation and uptitrate as able. · Lamictal is also associated with hepatic toxicity but given her seizure risk, we will continue this for now               ICU Core Measures     Vented Patient  VAP Bundle  VAP bundle ordered     A: Assess, Prevent, and Manage Pain · Has pain been assessed? Yes  · Need for changes to pain regimen? No   B: Both Spontaneous Awakening Trials (SATs) and Spontaneous Breathing Trials (SBTs) · Plan to perform spontaneous awakening trial today? Yes   · Plan to perform spontaneous breathing trial today? Yes   · Obvious barriers to extubation? Yes   C: Choice of Sedation · RASS Goal: -3 Moderate Sedation or 0 Alert and Calm  · Need for changes to sedation or analgesia regimen? No   D: Delirium · CAM-ICU: Negative   E: Early Mobility  · Plan for early mobility? No   F: Family Engagement · Plan for family engagement today? Yes       Antibiotic Review: we'll consider to dc Abx    Review of Invasive Devices: [place] Plan: Continue for accurate I/O monitoring for 48 hours        Prophylaxis:  VTE    Stress Ulcer  covered byomeprazole (PRILOSEC) suspension 2 mg/mL [[id number]]       Subjective no significant changes in patient's mental state  HPI/24hr events: no acute events overnight.  Pt spiked fever to 101  Unable to assess ROS due to AMS        Objective                            Vitals I/O      Most Recent Min/Max in 24hrs   Temp (!) 101.1 °F (38.4 °C) Temp  Min: 98.6 °F (37 °C)  Max: 101.1 °F (38.4 °C) Pulse 94 Pulse  Min: 86  Max: 118   Resp (!) 35 Resp  Min: 19  Max: 39   BP 99/53 BP  Min: 91/63  Max: 124/53   O2 Sat 99 % SpO2  Min: 93 %  Max: 100 %      Intake/Output Summary (Last 24 hours) at 9/7/2023 0709  Last data filed at 9/7/2023 0600  Gross per 24 hour   Intake 1569.92 ml   Output 4150 ml   Net -2580.08 ml         Diet Enteral/Parenteral; Tube Feeding No Oral Diet; Vital 1.5; Continuous; 40; 50; Every 6 hours     Invasive Monitoring Physical exam   Arterial Line  [place] /54  No data recorded   MAP 78 mmHg  No data recorded     PA Catheter   Most Recent  Min/Max in 24hrs    PAP   No data recorded   CVP   No data recorded   CI    No data recorded   SVR   No data recorded           Neuro Invasive Monitoring   Most Recent  Min/Max in 24hrs    GCS 5  Deepti Coma Scale Score  Min: 5  Max: 5    ICP     No data recorded    CPP (cuff)    No data recorded    CPP([place])    No data recorded   CVP   No data recorded     ECMO:   Most Recent Min/Max in 24hrs   Flow (LPM)   No data recorded   Speed (RPM)   No data recorded    Flow   No data recorded   FIO2        LVAD   Most Recent  Min/Max in 24hrs    Pump Flow (LPM)   No data recorded    Pump Speed (RPM)   No data recorded   Pump Power    No data recorded    Pulsatility Index   No data recorded    Physical Exam  Eyes:      Conjunctiva/sclera: Conjunctivae normal.   Skin:     Findings: Lesion and wound present. Comments: Bluish discoloration old toes bilaterally, slightly improved. Multiple blisters present bilateral dorsal surface of the feet  Multiple bruises on the upper and lower extremities  Multiple wounds   HENT:      Head: Normocephalic and atraumatic. Right Ear: No drainage. Left Ear: No drainage. Mouth/Throat:      Mouth: Mucous membranes are moist.   Cardiovascular:      Rate and Rhythm: Regular rhythm. Tachycardia present. Pulses: Normal pulses.    Abdominal:      General: Bowel sounds are normal.      Palpations: Abdomen is soft. Comments: PEG tube in place   Constitutional:       Appearance: She is ill-appearing. Interventions: She is intubated. Pulmonary:      Effort: She is intubated. Comments: Cards vesicular breathing sound consistent with mechanical ventilation  Vent settings: 16/400/6/60 percent. Patient switched to pressure support mode for weaning trial this a.m.   Neurological:      Comments: Patient is opening eyes to chest or abdomen and her name  Patient is not following commands   Genitourinary/Anorectal:  Ugalde       Diagnostic Studies      EKG: No new EKG obtained  Imaging: CT head without contrast is pending     Medications:  Scheduled PRN   bisacodyl, 10 mg, Daily  cefTRIAXone, 1,000 mg, Q24H  chlorhexidine, 15 mL, Q12H [facility]  digoxin, 62.5 mcg, Daily  DULoxetine, 60 mg, BID  gabapentin, 100 mg, HS  glycerin-hypromellose-, 1 drop, TID  insulin lispro, 1-5 Units, Q6H KHARI  lamoTRIgine, 50 mg, HS  lidocaine, 1 patch, Daily  magnesium sulfate, 1 g, Once  methylPREDNISolone sodium succinate, 40 mg, Q6H KHARI  midodrine, 5 mg, Q8H  omeprazole (PRILOSEC) suspension 2 mg/mL, 20 mg, Daily  oxyCODONE, 5 mg, Q8H  polyethylene glycol, 17 g, Daily  potassium chloride, 40 mEq, Q2H      fentanyl citrate (PF), 50 mcg, Q2H PRN       Continuous    furosemide, 10 mg/hr, Last Rate: 10 mg/hr (09/06/23 1144)         Labs:    CBC    Recent Labs     09/06/23 0443 09/07/23 0453   WBC 16.87* 16.33*   HGB 9.0* 9.7*   HCT 29.8* 33.0*   PLT 48* 73*     BMP    Recent Labs     09/06/23 0443 09/07/23 0453   SODIUM 148* 148*   K 3.4* 3.3*    103   CO2 39* 37*   AGAP 4 8   BUN 49* 55*   CREATININE 0.75 0.83   CALCIUM 9.4 10.0       Coags    No recent results     Additional Electrolytes  Recent Labs     09/07/23 0453   MG 1.9   PHOS 4.3*          Blood Gas    No recent results  No recent results LFTs  Recent Labs     09/06/23 0443   ALT 37   AST 36   ALKPHOS 70   ALB 2.2*   TBILI 1.86*       Infectious  No recent results  Glucose  Recent Labs     09/06/23  0443 09/07/23  0453   GLUC 107 124               Critical Care Time Delivered: Upon my evaluation, this patient had a high probability of imminent or life-threatening deterioration due to Multiple organ failure, which required my direct attention, intervention, and personal management. I have personally provided 30 minutes of critical care time, exclusive of procedures, teaching, family meetings, and any prior time recorded by providers other than myself.      Raj France MD

## 2023-09-07 NOTE — PLAN OF CARE
Problem: Potential for Falls  Goal: Patient will remain free of falls  Description: INTERVENTIONS:  - Educate patient/family on patient safety including physical limitations  - Instruct patient to call for assistance with activity   - Consult OT/PT to assist with strengthening/mobility   - Keep Call bell within reach  - Keep bed low and locked with side rails adjusted as appropriate  - Keep care items and personal belongings within reach  - Initiate and maintain comfort rounds  - Make Fall Risk Sign visible to staff  - Offer Toileting every 2 Hours, in advance of need  - Initiate/Maintain bed/chair alarm  - Obtain necessary fall risk management equipment: bed/chair alarm, call bell, gripper socks, walker, bedside commode  - Apply yellow socks and bracelet for high fall risk patients  - Consider moving patient to room near nurses station  Outcome: Progressing     Problem: MOBILITY - ADULT  Goal: Maintain or return to baseline ADL function  Description: INTERVENTIONS:  - Educate patient/family on patient safety including physical limitations  - Instruct patient to call for assistance with activity   - Consult OT/PT to assist with strengthening/mobility   - Keep Call bell within reach  - Keep bed low and locked with side rails adjusted as appropriate  - Keep care items and personal belongings within reach  - Initiate and maintain comfort rounds  - Make Fall Risk Sign visible to staff  - Offer Toileting every 2 Hours, in advance of need  - Initiate/Maintain bed/chair alarm  - Obtain necessary fall risk management equipment: bed/chair alarm, call bell, gripper socks, walker, bedside commode  - Apply yellow socks and bracelet for high fall risk patients  - Consider moving patient to room near nurses station  Outcome: Progressing  Goal: Maintains/Returns to pre admission functional level  Description: INTERVENTIONS:  - Perform BMAT or MOVE assessment daily.   - Set and communicate daily mobility goal to care team and patient. - Collaborate with rehabilitation services on mobility goals if consulted  - Reposition patient every 2 hours.   - Dangle patient 3 times a day  - Stand patient 3 times a day  - Ambulate patient 3 times a day  - Out of bed to chair 3 times a day   - Out of bed for meals  - Out of bed for toileting  - Record patient progress and toleration of activity level   Outcome: Progressing     Problem: PAIN - ADULT  Goal: Verbalizes/displays adequate comfort level or baseline comfort level  Description: Interventions:  - Encourage patient to monitor pain and request assistance  - Assess pain using appropriate pain scale  - Administer analgesics based on type and severity of pain and evaluate response  - Implement non-pharmacological measures as appropriate and evaluate response  - Consider cultural and social influences on pain and pain management  - Notify physician/advanced practitioner if interventions unsuccessful or patient reports new pain  Outcome: Progressing     Problem: INFECTION - ADULT  Goal: Absence or prevention of progression during hospitalization  Description: INTERVENTIONS:  - Assess and monitor for signs and symptoms of infection  - Monitor lab/diagnostic results  - Monitor all insertion sites, i.e. indwelling lines, tubes, and drains  - Monitor endotracheal if appropriate and nasal secretions for changes in amount and color  - [place] appropriate cooling/warming therapies per order  - Administer medications as ordered  - Instruct and encourage patient and family to use good hand hygiene technique  - Identify and instruct in appropriate isolation precautions (contact isolation for CRE) for identified infection/condition  Outcome: Progressing     Problem: SAFETY ADULT  Goal: Patient will remain free of falls  Description: INTERVENTIONS:  - Educate patient/family on patient safety including physical limitations  - Instruct patient to call for assistance with activity   - Consult OT/PT to assist with strengthening/mobility   - Keep Call bell within reach  - Keep bed low and locked with side rails adjusted as appropriate  - Keep care items and personal belongings within reach  - Initiate and maintain comfort rounds  - Make Fall Risk Sign visible to staff  - Offer Toileting every 2 Hours, in advance of need  - Initiate/Maintain bed/chair alarm  - Obtain necessary fall risk management equipment: bed/chair alarm, call bell, gripper socks, walker, bedside commode  - Apply yellow socks and bracelet for high fall risk patients  - Consider moving patient to room near nurses station  Outcome: Progressing  Goal: Maintain or return to baseline ADL function  Description: INTERVENTIONS:  - Educate patient/family on patient safety including physical limitations  - Instruct patient to call for assistance with activity   - Consult OT/PT to assist with strengthening/mobility   - Keep Call bell within reach  - Keep bed low and locked with side rails adjusted as appropriate  - Keep care items and personal belongings within reach  - Initiate and maintain comfort rounds  - Make Fall Risk Sign visible to staff  - Offer Toileting every 2 Hours, in advance of need  - Initiate/Maintain bed/chair alarm  - Obtain necessary fall risk management equipment: bed/chair alarm, call bell, gripper socks, walker, bedside commode  - Apply yellow socks and bracelet for high fall risk patients  - Consider moving patient to room near nurses station  Outcome: Progressing  Goal: Maintains/Returns to pre admission functional level  Description: INTERVENTIONS:  - Perform BMAT or MOVE assessment daily.   - Set and communicate daily mobility goal to care team and patient. - Collaborate with rehabilitation services on mobility goals if consulted  - Reposition patient every 2 hours.   - Dangle patient 3 times a day  - Stand patient 3 times a day  - Ambulate patient 3 times a day  - Out of bed to chair 3 times a day   - Out of bed for meals  - Out of bed for toileting  - Record patient progress and toleration of activity level   Outcome: Progressing     Problem: DISCHARGE PLANNING  Goal: Discharge to home or other facility with appropriate resources  Description: INTERVENTIONS:  - Identify barriers to discharge w/patient  - Arrange for needed discharge resources and transportation as appropriate  - Identify discharge learning needs   - Refer to Case Management Department for coordinating discharge planning if the patient needs post-hospital services based on physician/advanced practitioner order or complex needs related to functional status, cognitive ability, or social support system  Outcome: Progressing     Problem: Knowledge Deficit  Goal: Patient/family/caregiver demonstrates understanding of disease process, treatment plan, medications, and discharge instructions  Description: Complete learning assessment and assess knowledge base.   Interventions:  - Provide teaching at level of understanding  - Provide teaching via preferred learning methods  Outcome: Progressing     Problem: Prexisting or High Potential for Compromised Skin Integrity  Goal: Skin integrity is maintained or improved  Description: INTERVENTIONS:  - Identify patients at risk for skin breakdown  - Assess and monitor skin integrity  - Assess and monitor nutrition and hydration status  - Monitor labs   - Assess for incontinence   - Turn and reposition patient  - Assist with mobility/ambulation  - Relieve pressure over bony prominences  - Avoid friction and shearing  - Provide appropriate hygiene as needed including keeping skin clean and dry  - Evaluate need for skin moisturizer/barrier cream  - Collaborate with interdisciplinary team   - Patient/family teaching  - Consider wound care consult   Outcome: Progressing     Problem: Nutrition/Hydration-ADULT  Goal: Nutrient/Hydration intake appropriate for improving, restoring or maintaining nutritional needs  Description: Monitor and assess patient's nutrition/hydration status for malnutrition. Collaborate with interdisciplinary team and initiate plan and interventions as ordered. Monitor patient's weight and dietary intake as ordered or per policy. Utilize nutrition screening tool and intervene as necessary. Determine patient's food preferences and provide high-protein, high-caloric foods as appropriate.      INTERVENTIONS:  - Monitor oral intake, urinary output, labs, and treatment plans  - Assess nutrition and hydration status and recommend course of action  - Evaluate amount of meals eaten  - Do not leave patient alone while eating due to aspiration risk  - Assist patient with eating if necessary   - Allow adequate time for meals  - Recommend/ encourage appropriate diets, oral nutritional supplements, and vitamin/mineral supplements  - Order, calculate, and assess calorie counts as needed  - Assess need for intravenous fluids  - Provide specific nutrition/hydration education as appropriate  - Include patient in decisions related to nutrition  Outcome: Progressing     Problem: RESPIRATORY - ADULT  Goal: Achieves optimal ventilation and oxygenation  Description: INTERVENTIONS:  - Assess for changes in respiratory status  - Assess for changes in mentation and behavior  - Position to facilitate oxygenation and minimize respiratory effort  - Oxygen administered by appropriate delivery if ordered   - Encourage broncho-pulmonary hygiene including cough, deep breathe, Incentive Spirometry, flutter valve  - Assess the need for suctioning and aspirate as needed  - Assess and instruct to report SOB or any respiratory difficulty  - Respiratory Therapy support as indicated  Outcome: Progressing     Problem: GENITOURINARY - ADULT  Goal: Maintains or returns to baseline urinary function  Description: INTERVENTIONS:  - Assess urinary function  - Encourage oral fluids to ensure adequate hydration if ordered  - Administer IV fluids as ordered to ensure adequate hydration  - Administer ordered medications as needed  - Offer frequent toileting  - Follow urinary retention protocol if ordered  Outcome: Progressing  Goal: Urinary catheter remains patent  Description: INTERVENTIONS:  - Assess patency of urinary catheter  - Follow guidelines for intermittent irrigation of non-functioning urinary catheter  Outcome: Progressing     Problem: NEUROSENSORY - ADULT  Goal: Achieves stable or improved neurological status  Description: INTERVENTIONS  - Monitor and report changes in neurological status  - Monitor vital signs such as temperature, blood pressure, glucose, and any other labs ordered   - Initiate measures to prevent increased intracranial pressure  - Monitor for seizure activity and implement precautions if appropriate      Outcome: Progressing  Goal: Achieves maximal functionality and self care  Description: INTERVENTIONS  - Monitor swallowing and airway patency with patient fatigue and changes in neurological status  - Encourage and assist patient to increase activity and self care.    - Encourage visually impaired, hearing impaired and aphasic patients to use assistive/communication devices  Outcome: Progressing     Problem: GASTROINTESTINAL - ADULT  Goal: Maintains adequate nutritional intake  Description: INTERVENTIONS:  - Monitor percentage of each meal consumed  - Identify factors contributing to decreased intake, treat as appropriate  - Assist with meals as needed  - Monitor I&O, weight, and lab values if indicated  - Obtain nutrition services referral as needed  Outcome: Progressing  Goal: Establish and maintain optimal ostomy function  Description: INTERVENTIONS:  - Assess bowel function  - Encourage oral fluids to ensure adequate hydration  - Administer IV fluids if ordered to ensure adequate hydration   - Administer ordered medications as needed  - Encourage mobilization and activity  - Nutrition services referral to assist patient with appropriate food choices  - Assess stoma site  - Consider wound care consult   Outcome: Progressing     Problem: METABOLIC, FLUID AND ELECTROLYTES - ADULT  Goal: Electrolytes maintained within normal limits  Description: INTERVENTIONS:  - Monitor labs and assess patient for signs and symptoms of electrolyte imbalances  - Administer electrolyte replacement as ordered  - Monitor response to electrolyte replacements, including repeat lab results as appropriate  - Instruct patient on fluid and nutrition as appropriate  Outcome: Progressing  Goal: Fluid balance maintained  Description: INTERVENTIONS:  - Monitor labs   - Monitor I/O and WT  - Instruct patient on fluid and nutrition as appropriate  - Assess for signs & symptoms of volume excess or deficit  Outcome: Progressing  Goal: Glucose maintained within target range  Description: INTERVENTIONS:  - Monitor Blood Glucose as ordered  - Assess for signs and symptoms of hyperglycemia and hypoglycemia  - Administer ordered medications to maintain glucose within target range  - Assess nutritional intake and initiate nutrition service referral as needed  Outcome: Progressing     Problem: SKIN/TISSUE INTEGRITY - ADULT  Goal: Skin Integrity remains intact(Skin Breakdown Prevention)  Description: Assess:  -Assess extremities for adequate circulation and sensation     Bed Management:  -Have minimal linens on bed & keep smooth, unwrinkled  -Change linens as needed when moist or perspiring    Toileting:  -Offer bedside commode      Skin Care:  -Avoid use of baby powder, tape, friction and shearing, hot water or constrictive clothing    Outcome: Progressing  Goal: Incision(s), wounds(s) or drain site(s) healing without S/S of infection  Description: INTERVENTIONS  - Assess and document dressing, incision, wound bed, drain sites and surrounding tissue  - Provide patient and family education  Outcome: Progressing     Problem: HEMATOLOGIC - ADULT  Goal: Maintains hematologic stability  Description: INTERVENTIONS  - Assess for signs and symptoms of bleeding or hemorrhage  - Monitor labs  - Administer supportive blood products/factors as ordered and appropriate  Outcome: Progressing

## 2023-09-07 NOTE — ASSESSMENT & PLAN NOTE
9/4 proCal is trending up, pt spiked fever, pt met SIRS criteria, repeat CXR showed opacities, pt started on Vanc/Meropenem. Central and femoral lines removed, [place] exchanged.    Pt spiked fever on 9/7 overnight, wbc slowly trending down  · Antibiotic narrowed to ceftriaxone and vancomycin  · Urine culture is negative,  Blood culture - No growth in 48 hours  · Abx narrowed to Ceftriaxone 4/5

## 2023-09-07 NOTE — ASSESSMENT & PLAN NOTE
Recent Labs     09/05/23  0346 09/06/23  0443 09/07/23  0453   CREATININE 0.79 0.75 0.83   EGFR [id number] [phone] [id number]     Estimated Creatinine Clearance: 55.2 mL/min (by C-G formula based on SCr of 0.83 mg/dL). Suspect this is likely in the setting of poor perfusion and cardiorenal syndrome. Kidney function at baseline    Plan:  Avoid nephrotoxic agents. Continue monitoring renal indices.

## 2023-09-07 NOTE — ASSESSMENT & PLAN NOTE
· Patient has a history of seronegative multiple joint arthropathy  · Currently on hydroxychloroquine 200 mg twice daily  · Patient follows up with rheumatologist    Plan:  · Holding plaquenil  · 9/6 Pt started on steroids

## 2023-09-07 NOTE — RESPIRATORY THERAPY NOTE
Per provider, placed pt on CPAP/PS for exercise daily as tolerated. Pt appears comfortable and in no distress. VS and pulmonary mechanics stable. Plan is to keep on CPAP/PS today and place back on AC/VC overnight. Will continue to monitor.

## 2023-09-08 NOTE — PROGRESS NOTES
[facility]  Progress Note  Name: Vy Ge  MRN: [id number]  Unit/Bed#: ICU 04 I Date of Admission: 8/10/2023   Date of Service: 9/8/2023 I Hospital Day: 34    Assessment/Plan   * Acute respiratory failure with hypoxia [facility])  Assessment & Plan  · Patient was brought to the ED after being in altered mental status, requiring more oxygen. · History of multiple episodes of vomiting at night prior to admission  · Initially admitted with aspiration pneumonia, treated with a course of antibiotics and improved  · Remained on oxygen therapy, but developed a change in MS and required transfer back to the ICU  · She was noted to have an increase WOB and was placed on BIPAP  · This current episode does not appear to be related to an acute lung issue but more likely related to acute hepatic failure  · Patient is status postcardiac arrest on 8/30, was intubated   · CT H - no change from prior  · 9/4 proCal is trending up, pt spiked fever, pt met SIRS criteria, repeat CXR showed opacities, pt started on Vanc/Meropenem, transitioned to CFX/Vanc. Central and femoral lines removed, Ugalde exchanged. We'll follow Bx and consider to exchange midline. Plan:  · Vent : 16/400/6/60%   · Goal SpO2 > 92%  · Pt had trial on pressure support mode on 9/6 and 9/7 and did well, if mental status improving will consider extubation      Cardiac arrest [facility])  Assessment & Plan  Patient is a 58-year-old female  who was status postcardiac arrest with ROSC,   · 8/29 It appeared patient became bradycardic and agonal respiration, patient arrested and CPR and ACLS protocol -CPR was started received epinephrine x 3, mag sulfate, bicarb x 2 , fibrillation, serum chloride dextrose presents from 927 to 939  · Patient now is a DNR level 2   · Milrinone and NE started on 8/29, Vasopressin replaced NE on 8/31.  Milrinone and vasopressin  DC'd on 9/2  · Midodrine added for borderline BP on 9/6  · 9/7 repeat ECHO showed EF 35%          Thrombocytopenia (HCC)  Assessment & Plan  · Platelets 73 today  · No obvious source of bleeding, patient did not receive heparin  · CT abdomen pelvis showed no retroperitoneal hematoma  · DIC panel was done on 80/31s,: fibrin products more than 10, plasminogen low 20, Antithrombin low 19, APTT 40, INR 3.57, D-dimer elevated 8.49, fibrinogen low 169, platelets 41  · Hemolysis panel was equivocal, after globin normal, LDH borderline elevated  275, repeat test - was normal  Immature platelet fraction 52.5%, C3-C4 low,RF factor elevated, antiMPO and anti proteinasa 3 antibody -not detected.  ANABELLA is negative, Repeat LDH wnl, double-stranded antibody <1  Plan:  · Repeat haptoglobin is pending  · Follow autoimmune work-up (histone antibody, anti-Citrulline aided peptide - in process)  · Monitor for active bleeding  · Monitor platelets, transfuse as needed  · Restarted Heparin for DVT ppx    A-fib [facility])  Assessment & Plan  · New onset episode 9/2, received digoxin 500mcg x 1, followed by 250 6 h later, On 9/3 Digoxin 62.5 daily  · Iodine allergy, unable to use amiodarone  · Replete electrolytes as needed for goal Mag > 2.0, Phos >3.0, K >4.0  · Telemetry monitoring  · Digoxin level at goal  · Will reconsider anticoagulation given improvement in plt and liver function        Volume overload  Assessment & Plan  Most likely multifactorial, multiple comorbidities requiring infusions, cardiogenic shock with hypotension and kidney hypoperfusion, poor nutritional status and low albumin  S/a + 14  On 9/4 pt received Lasix 40 + 20 IV (produced 2660 cc of urine)  9/5 increased dose to Lasix 40 mg IV twice daily  9/6 pt was started on lasix drip (produced 4150 cc of urine), continued on 9/7 with 2825 cc urine output      Plan:  · Strict I's and O's  · Daily weights  · C/w Lasix ggt, will consider to switch to BID injections if continue to respond well    HFrEF (heart failure with reduced ejection fraction) [facility])  Assessment & Plan  Wt Readings from Last 3 Encounters:   09/08/23 60.1 kg (132 lb 7.9 oz)   07/29/23 49.4 kg (108 lb 14.4 oz)   06/30/23 55.3 kg (122 lb)   · 8/27 CXR showed pulmonary congestion  · On PE soft tissue swelling, anasarca on imaging, + 11 l balance  · 8/29 S/p cardiac arrest with ROSC   · Repeat echo EF 15-20 %   · Beta blocker on hold due to hypotension. · 8/30 Started on milrinone, nor epi,   · 9/1 DC NE, started on vasopressi, c/w milrinone  · 9/2 DC milrinone and vasopressin due to afib. Loaded with Digoxin  · c/w digoxin 62.5  daily  · 9/6 switched Lasix ggt with good response  · 9/7 Repeat echo  EF 35%    Anemia  Assessment & Plan  Recent Labs     09/06/23  0443 09/07/23  0453   HGB 9.0* 9.7*     8/31 Hgb noted to be 6.4.  S/p 1 unit of PRBCs   Today hgb is still pending  No signs or symptoms of GI bleed, bruisers over her knees bilaterally and upper extremities  DIC labs showed decreased fibrinogen but no fibrinogen split products, no schistocytes, LDH and haptoglobin normal  CT C/A/P no signs of internal bleeding  DVT doppler LE no DVT  · Monitor HGB, transfuse as needed    Acute liver failure  Assessment & Plan  · The patient had a slight transaminitis during admission but over the past few days has steadily risen and then doubled on 8/26 with his AST 2800, ALT 1114  · Severe elevation of INR, out of proportion to liver injury  · Multifactorial - She was found to have a slightly elevated tylenol level (that continues to normalize) which may be a contributing factor however, not thought to be the only issue (ischemic vs viral hepatitis)  · She has HF, which may contribute to hepatotoxicity  · Additionally she was on a few drugs (Plaquinel, wellbutrin and lamictal) which can all be associated with liver failure  · Plaquinel and Wellbutrin held  · Doppler studies were normal, no thrombus  · Hepatitis panel on 8.24 was positive for hep C antibodies otherwise negative  · EBV ab/ag positive  · HSV, and VZV negative  · S/p acyclovir   · GI is following- appreciate their assistance with her care  · S/p NAC and fomepizole, dc on  8/28 as per Medical Toxicology  · Supportive care  · Vitamin K supplementation  · She would not be a candidate for a liver transplant  Recent Labs     09/06/23  0443   AST 36   ALT 37   ALKPHOS 70   TBILI 1.86*   BILIDIR 0.84*     · LFTs trended to normal range. T Bili elevated, mixed picture with both direct and indirect bili elevation, improving    Multiple wounds  Assessment & Plan  Multiple wounds and skin lesions back, lower and upper extremities  Bluish discoloration of the toes bilaterally  Multiple blisters on the bilateral feet  · Proceed with wound care recommendations    Hypoglycemia  Assessment & Plan  · Current hypoglycemia may be related to her liver failure  · S/p D10  · No hypoglycemic episodes noted today. Plan:  Q6 finger sticks   Currently BS on a higher range given steroids was added to management  ISS      Hyperbilirubinemia  Assessment & Plan  9/6 total bilirubin 1.86 (improved since yesterday 2.97), direct bilirubin 0.84  Repeat LDH wnl  Repeat haptoglobin in process  Right upper quadrant ultrasound: no stone, no obstruction, CBD not dilated        SIRS (systemic inflammatory response syndrome) ([address])  Assessment & Plan  9/4 proCal is trending up, pt spiked fever, pt met SIRS criteria, repeat CXR showed opacities, pt started on Vanc/Meropenem. Central and femoral lines removed, Ugalde exchanged. Pt spiked fever on 9/7 overnight, wbc slowly trending down  · Antibiotic narrowed to ceftriaxone and vancomycin  · Urine culture is negative,  Blood culture - No growth in 48 hours  · Abx narrowed to Ceftriaxone 5/5 total course.         Closed T12 fracture [facility])  Assessment & Plan  Patient has a history of low impact fall that resulted in multiple T-spine fractures  Currently pain controlled by morphine    Plan:  · C/w gabapentin  · She is on narcotics chronically, but has been hospitalized for about 2 weeks with variable use  · PRN fentanyl   · Oxycodone 2.5 mg Q8      Dysphagia  Assessment & Plan  · Most likely contributing to her respiratory status initially  · peg tube placed 8/17  · Continue tube feeds  · Nutrition is following, formula adjusted as per nutrrtion recommendations      Altered mental status  Assessment & Plan  · Noted to be more altered on 8/26 with eye opening to name but not verbal, not following commands  · 8/27 Mental status wavering this morning  · Ammonia level was normal blood sugar was normal head cT without acute abnormality  · Liver enzymes trending down, no asterixis on exam  AMS most likely multifactorial due to multiple organ failure    Plan:  · Slight improving in mental status since yesterday. Pt is not on sedating medications, randomly opening eyes, not able to follow face expression commands with rising eye brows  · Repeat CTH showed no acute pathology, possible sinusitis    History of stroke  Assessment & Plan  · Patient had right MCA stroke, s/p mechanical thrombectomy earlier this year  · Although the patient moves all the limbs, there is residual weakness in the left upper and lower limb  · CT head on August 8 shows No acute intracranial abnormality. Repeat [facility] 8/31 - no acute changes    Plan:  · Will consider to restart Eliquis given improvement in liver function  · thrombocytopenia improving, heparin for DVT ppx  · Frequent neurochecks      Hypercalcemia  Assessment & Plan  · 2/2 hyperparathyroidism  · follows with endocrine  · sensipar on hold 2/2 unable to crush    LAILA (acute kidney injury) [facility])  Assessment & Plan  Recent Labs     09/07/23  0453 09/07/23 2011 09/08/23  0450   CREATININE 0.83 0.96 1.04   EGFR 72 60 54     Estimated Creatinine Clearance: 44.1 mL/min (by C-G formula based on SCr of 1.04 mg/dL). Suspect this is likely in the setting of poor perfusion and cardiorenal syndrome.    Kidney function at baseline    Plan:  Avoid nephrotoxic agents. Continue monitoring renal indices. 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 Lower dose started 2/2 sedation and uptitrate as able               ICU Core Measures     Vented Patient  VAP Bundle  VAP bundle ordered     A: Assess, Prevent, and Manage Pain · Has pain been assessed? Yes  · Need for changes to pain regimen? No   B: Both Spontaneous Awakening Trials (SATs) and Spontaneous Breathing Trials (SBTs) · Plan to perform spontaneous awakening trial today? Yes   · Plan to perform spontaneous breathing trial today? Yes   · Obvious barriers to extubation? Yes   C: Choice of Sedation · RASS Goal: -2 Light Sedation or 0 Alert and Calm  · Need for changes to sedation or analgesia regimen? No   D: Delirium · CAM-ICU: Negative   E: Early Mobility  · Plan for early mobility? No   F: Family Engagement · Plan for family engagement today? Yes       Antibiotic Review: Patient on appropriate coverage based on culture data. Review of Invasive Devices: Aftab Plan: Continue for accurate I/O monitoring for 48 hours        Prophylaxis:  VTE VTE covered by:  heparin (porcine), Subcutaneous, 5,000 Units at 09/08/23 0612       Stress Ulcer  covered byomeprazole (PRILOSEC) suspension 2 mg/mL [[id number]]       Subjective   HPI/24hr events: No acute events overnight, pt is hemodynamically stable. Tube feeds leakage, currently on hold. Unable to assesses ROS due to sedation.        Objective                            Vitals I/O      Most Recent Min/Max in 24hrs   Temp 100.4 °F (38 °C) Temp  Min: 97.6 °F (36.4 °C)  Max: 101.5 °F (38.6 °C)   Pulse 92 Pulse  Min: 88  Max: 106   Resp (!) 29 Resp  Min: 18  Max: 67   BP 90/57 BP  Min: 90/57  Max: 142/77   O2 Sat  (unable to detect) SpO2  Min: 98 %  Max: 99 %      Intake/Output Summary (Last 24 hours) at 9/8/2023 0725  Last data filed at 9/8/2023 0600  Gross per 24 hour   Intake 1830 ml   Output 2825 ml   Net -995 ml         Diet Enteral/Parenteral; Tube Feeding No Oral Diet; Vital 1.5; Continuous; 40; 200; Every 6 hours     Invasive Monitoring Physical exam   Arterial Line  Verito /54  No data recorded   MAP 78 mmHg  No data recorded     PA Catheter   Most Recent  Min/Max in 24hrs    PAP   No data recorded   CVP   No data recorded   CI    No data recorded   SVR   No data recorded           Neuro Invasive Monitoring   Most Recent  Min/Max in 24hrs    GCS 5  Mobile Coma Scale Score  Min: 5  Max: 5    ICP     No data recorded    CPP (cuff)    No data recorded    CPP(verito)    No data recorded   CVP   No data recorded     ECMO:   Most Recent Min/Max in 24hrs   Flow (LPM)   No data recorded   Speed (RPM)   No data recorded    Flow   No data recorded   FIO2        LVAD   Most Recent  Min/Max in 24hrs    Pump Flow (LPM)   No data recorded    Pump Speed (RPM)   No data recorded   Pump Power    No data recorded    Pulsatility Index   No data recorded    Physical Exam  Eyes:      Conjunctiva/sclera: Conjunctivae normal.   Skin:     Findings: Lesion and wound present. Comments: extremities are cold to touch  Multiple wounds  Loose discoloration of the toes bilaterally, same as yesterday  Stents have blistering present on the dorsal dorsal surface of all the feet  Multiple bruisers over the knees and upper extremities     HENT:      Head: Normocephalic and atraumatic. Mouth/Throat:      Mouth: Mucous membranes are moist.   Cardiovascular:      Rate and Rhythm: Tachycardia present. Rhythm irregular. Pulses: Normal pulses. Abdominal:      General: Bowel sounds are normal.      Palpations: Abdomen is soft. Constitutional:       Appearance: She is malnourished. She is ill-appearing. Interventions: She is intubated. Comments: Cachectic with hyperextended neck   Pulmonary:      Effort: She is intubated. Comments: Coarse vesicular breathing consistent with mechanical ventilation: 16/400/6/60%  Neurological:      Comments: Unable to assess mental status, patient is sleepy, not opening eyes on calling her name possibly due to early morning, will reassess later in the day   Genitourinary/Anorectal:  Ugalde       Diagnostic Studies      EKG: no new ECG obtained  Imaging: CTH  I have personally reviewed pertinent reports.        Medications:  Scheduled PRN   bisacodyl, 10 mg, Daily  cefTRIAXone, 1,000 mg, Q24H  chlorhexidine, 15 mL, Q12H [facility]  digoxin, 62.5 mcg, Daily  DULoxetine, 60 mg, BID  gabapentin, 100 mg, HS  glycerin-hypromellose-, 1 drop, TID  heparin (porcine), 5,000 Units, Q8H [facility]  insulin lispro, 1-5 Units, Q6H [facility]  lamoTRIgine, 50 mg, HS  lidocaine, 1 patch, Daily  methylPREDNISolone sodium succinate, 40 mg, Q6H [facility]  midodrine, 5 mg, Q8H  omeprazole (PRILOSEC) suspension 2 mg/mL, 20 mg, Daily  oxyCODONE, 2.5 mg, Q8H  polyethylene glycol, 17 g, Daily  potassium chloride, 40 mEq, Q2H          Continuous    furosemide, 10 mg/hr, Last Rate: 10 mg/hr (09/07/23 0724)         Labs:    CBC    Recent Labs     09/07/23  0453   WBC 16.33*   HGB 9.7*   HCT 33.0*   PLT 73*     BMP    Recent Labs     09/07/23 2011 09/08/23  0450   SODIUM 151* 150*   K 3.3* 3.2*    103   CO2 36* 35*   AGAP 11 12   BUN 64* 73*   CREATININE 0.96 1.04   CALCIUM 9.9 9.8       Coags    No recent results     Additional Electrolytes  Recent Labs     09/07/23  0453   MG 1.9   PHOS 4.3*          Blood Gas    No recent results  No recent results LFTs  No recent results    Infectious  Recent Labs 09/08/23 0450   PROCALCITONI 0.66*     Glucose  Recent Labs     09/07/23 0453 09/07/23 2011 09/08/23 0450   GLUC 124 131 157*               Critical Care Time Delivered: Upon my evaluation, this patient had a high probability of imminent or life-threatening deterioration due to multiple organ failure, which required my direct attention, intervention, and personal management. I have personally provided 30 minutes of critical care time, exclusive of procedures, teaching, family meetings, and any prior time recorded by providers other than myself.      Felicita Ford MD

## 2023-09-08 NOTE — ASSESSMENT & PLAN NOTE
· Platelets 73 today  · No obvious source of bleeding, patient did not receive heparin  · CT abdomen pelvis showed no retroperitoneal hematoma  · DIC panel was done on 80/31s,: fibrin products more than 10, plasminogen low 20, Antithrombin low 19, APTT 40, INR 3.57, D-dimer elevated 8.49, fibrinogen low 169, platelets 41  · Hemolysis panel was equivocal, after globin normal, LDH borderline elevated  275, repeat test - was normal  Immature platelet fraction 43.9%, C3-C4 low,RF factor elevated, antiMPO and anti proteinasa 3 antibody -not detected.  ANABELLA is negative, Repeat LDH wnl, double-stranded antibody <1  Plan:  · Repeat haptoglobin is pending  · Follow autoimmune work-up (histone antibody, anti-Citrulline aided peptide - in process)  · Monitor for active bleeding  · Monitor platelets, transfuse as needed  · Restarted Heparin for DVT ppx

## 2023-09-08 NOTE — ASSESSMENT & PLAN NOTE
Wt Readings from Last 3 Encounters:   09/08/23 60.1 kg (132 lb 7.9 oz)   07/29/23 49.4 kg (108 lb 14.4 oz)   06/30/23 55.3 kg (122 lb)   · 8/27 CXR showed pulmonary congestion  · On PE soft tissue swelling, anasarca on imaging, + 11 l balance  · 8/29 S/p cardiac arrest with ROSC   · Repeat echo EF 15-20 %   · Beta blocker on hold due to hypotension. · 8/30 Started on milrinone, nor epi,   · 9/1 DC NE, started on vasopressi, c/w milrinone  · 9/2 DC milrinone and vasopressin due to afib.  Loaded with Digoxin  · c/w digoxin 62.5  daily  · 9/6 switched Lasix ggt with good response  · 9/7 Repeat echo  EF 35%

## 2023-09-08 NOTE — DEATH NOTE
INPATIENT DEATH NOTE  Edinson Cervantes 70 y.o. female MRN: [id number]  Unit/Bed#: ICU 04 Encounter: [id number]    Date, Time and Cause of Death    Date of Death: 23  Time of Death:  4:29 PM  Preliminary Cause of Death: Multiple organ failure with heart failure ([address])  Entered by: Cr Rawls[[id number]]     Attribution     AG1. [id number] Burden MD 23 16:34           Patient's Information  Pronounced by: Vinh Esposito  Did the patient's death occur in the ED?: No  Did the patient's death occur in the OR?: No  Did the patient's death occur less than 10 days post-op?: No  Did the patient's death occur within 24 hours of admission?: No  Was code status DNR at the time of death?: Yes    PHYSICAL EXAM:  Unresponsive to noxious stimuli, Spontaneous respirations absent, Breath sounds absent, Carotid pulse absent, Heart sounds absent, Pupillary light reflex absent and Corneal blink reflex absent    Medical Examiner notification criteria:  NONE APPLICABLE   Medical Examiner's office notified?:  No, does not meet ME notification criteria   Medical Examiner accepted case?:  No  Name of Medical Examiner: N/A    Family Notification  Was the family notified?: Yes  Date Notified: 23  Time Notified: 3244  Notified by: Vinh Esposito  Name of Family Notified of Death: Sherry Torres   Relationship to Patient: Daughter  Family Notification Route:  At bedside  Was the family told to contact a  home?: No (daughter is having anxiety, will notify later)    Autopsy Options:  Not aplicable    Primary Service Attending Physician notified?:  yes - Attending:  Abdiel Mueller MD    Physician/Resident responsible for completing Discharge Summary:  Vinh Esposito

## 2023-09-08 NOTE — ASSESSMENT & PLAN NOTE
· Noted to be more altered on 8/26 with eye opening to name but not verbal, not following commands  · 8/27 Mental status wavering this morning  · Ammonia level was normal blood sugar was normal head cT without acute abnormality  · Liver enzymes trending down, no asterixis on exam  AMS most likely multifactorial due to multiple organ failure    Plan:  · Slight improving in mental status since yesterday.  Pt is not on sedating medications, randomly opening eyes, not able to follow face expression commands with rising eye brows  · Repeat CTH showed no acute pathology, possible sinusitis

## 2023-09-08 NOTE — ASSESSMENT & PLAN NOTE
· New onset episode 9/2, received digoxin 500mcg x 1, followed by 250 6 h later, On 9/3 Digoxin 62.5 daily  · Iodine allergy, unable to use amiodarone  · Replete electrolytes as needed for goal Mag > 2.0, Phos >3.0, K >4.0  · Telemetry monitoring  · Digoxin level at goal  · Will reconsider anticoagulation given improvement in plt and liver function

## 2023-09-08 NOTE — ASSESSMENT & PLAN NOTE
Patient has a history of low impact fall that resulted in multiple T-spine fractures  Currently pain controlled by morphine    Plan:  · C/w gabapentin  · She is on narcotics chronically, but has been hospitalized for about 2 weeks with variable use  · PRN fentanyl   · Oxycodone 2.5 mg Q8

## 2023-09-08 NOTE — ASSESSMENT & PLAN NOTE
9/4 proCal is trending up, pt spiked fever, pt met SIRS criteria, repeat CXR showed opacities, pt started on Vanc/Meropenem. Central and femoral lines removed, Ugalde exchanged. Pt spiked fever on 9/7 overnight, wbc slowly trending down  · Antibiotic narrowed to ceftriaxone and vancomycin  · Urine culture is negative,  Blood culture - No growth in 48 hours  · Abx narrowed to Ceftriaxone 5/5 total course.

## 2023-09-08 NOTE — PROGRESS NOTES
Riley Seth is a 70 y.o. female who is currently ordered Vancomycin IV with management by the Pharmacy Consult Service. Relevant clinical data and objective / subjective history reviewed. Vancomycin Assessment:  Indication and Goal AUC/Trough:  Pneumonia (goal -600, trough >10), source unclear, -600, trough >10  Clinical Status: worsening with new fever and hypotension  Micro:  No new micro data    Renal Function:  SCr: 1.13 mg/dL from 0.83 mg/dL yesterday  CrCl: 40.6 mL/min  UOP: 2 mL/kg/hr on furosemide gtt  Renal replacement: Not on dialysis  Days of Therapy: 1  Current Dose:  New start  Vancomycin Plan:  New Dosing:  Give 1,500 mg x 1 loading dose followed by 500 mg daily PRN vancomycin level < 15  Next Level: Random level with AM labs on 9/9  Renal Function Monitoring: Daily BMP and UOP assessment  Pharmacy will continue to follow closely for s/sx of nephrotoxicity, infusion reactions and appropriateness of therapy. BMP and CBC will be ordered per protocol. We will continue to follow the patient’s culture results and clinical progress daily.     Juana Zavaleta, PharmD, [address] [place] [address] and Internal Medicine Clinical Pharmacist  [phone] or via [facility]

## 2023-09-08 NOTE — PLAN OF CARE
Problem: Potential for Falls  Goal: Patient will remain free of falls  Description: INTERVENTIONS:  - Educate patient/family on patient safety including physical limitations  - Instruct patient to call for assistance with activity   - Consult OT/PT to assist with strengthening/mobility   - Keep Call bell within reach  - Keep bed low and locked with side rails adjusted as appropriate  - Keep care items and personal belongings within reach  - Initiate and maintain comfort rounds  - Make Fall Risk Sign visible to staff  - Offer Toileting every 2 Hours, in advance of need  - Initiate/Maintain bed/chair alarm  - Obtain necessary fall risk management equipment: bed/chair alarm, call bell, gripper socks, walker, bedside commode  - Apply yellow socks and bracelet for high fall risk patients  - Consider moving patient to room near nurses station  Outcome: Progressing     Problem: MOBILITY - ADULT  Goal: Maintain or return to baseline ADL function  Description: INTERVENTIONS:  - Educate patient/family on patient safety including physical limitations  - Instruct patient to call for assistance with activity   - Consult OT/PT to assist with strengthening/mobility   - Keep Call bell within reach  - Keep bed low and locked with side rails adjusted as appropriate  - Keep care items and personal belongings within reach  - Initiate and maintain comfort rounds  - Make Fall Risk Sign visible to staff  - Offer Toileting every 2 Hours, in advance of need  - Initiate/Maintain bed/chair alarm  - Obtain necessary fall risk management equipment: bed/chair alarm, call bell, gripper socks, walker, bedside commode  - Apply yellow socks and bracelet for high fall risk patients  - Consider moving patient to room near nurses station  Outcome: Progressing  Goal: Maintains/Returns to pre admission functional level  Description: INTERVENTIONS:  - Perform BMAT or MOVE assessment daily.   - Set and communicate daily mobility goal to care team and patient. - Collaborate with rehabilitation services on mobility goals if consulted  - Reposition patient every 2 hours.   - Dangle patient 3 times a day  - Stand patient 3 times a day  - Ambulate patient 3 times a day  - Out of bed to chair 3 times a day   - Out of bed for meals  - Out of bed for toileting  - Record patient progress and toleration of activity level   Outcome: Progressing     Problem: PAIN - ADULT  Goal: Verbalizes/displays adequate comfort level or baseline comfort level  Description: Interventions:  - Encourage patient to monitor pain and request assistance  - Assess pain using appropriate pain scale  - Administer analgesics based on type and severity of pain and evaluate response  - Implement non-pharmacological measures as appropriate and evaluate response  - Consider cultural and social influences on pain and pain management  - Notify physician/advanced practitioner if interventions unsuccessful or patient reports new pain  Outcome: Progressing     Problem: INFECTION - ADULT  Goal: Absence or prevention of progression during hospitalization  Description: INTERVENTIONS:  - Assess and monitor for signs and symptoms of infection  - Monitor lab/diagnostic results  - Monitor all insertion sites, i.e. indwelling lines, tubes, and drains  - Monitor endotracheal if appropriate and nasal secretions for changes in amount and color  - Index appropriate cooling/warming therapies per order  - Administer medications as ordered  - Instruct and encourage patient and family to use good hand hygiene technique  - Identify and instruct in appropriate isolation precautions (contact isolation for CRE) for identified infection/condition  Outcome: Progressing     Problem: SAFETY ADULT  Goal: Patient will remain free of falls  Description: INTERVENTIONS:  - Educate patient/family on patient safety including physical limitations  - Instruct patient to call for assistance with activity   - Consult OT/PT to assist with strengthening/mobility   - Keep Call bell within reach  - Keep bed low and locked with side rails adjusted as appropriate  - Keep care items and personal belongings within reach  - Initiate and maintain comfort rounds  - Make Fall Risk Sign visible to staff  - Offer Toileting every 2 Hours, in advance of need  - Initiate/Maintain bed/chair alarm  - Obtain necessary fall risk management equipment: bed/chair alarm, call bell, gripper socks, walker, bedside commode  - Apply yellow socks and bracelet for high fall risk patients  - Consider moving patient to room near nurses station  Outcome: Progressing  Goal: Maintain or return to baseline ADL function  Description: INTERVENTIONS:  - Educate patient/family on patient safety including physical limitations  - Instruct patient to call for assistance with activity   - Consult OT/PT to assist with strengthening/mobility   - Keep Call bell within reach  - Keep bed low and locked with side rails adjusted as appropriate  - Keep care items and personal belongings within reach  - Initiate and maintain comfort rounds  - Make Fall Risk Sign visible to staff  - Offer Toileting every 2 Hours, in advance of need  - Initiate/Maintain bed/chair alarm  - Obtain necessary fall risk management equipment: bed/chair alarm, call bell, gripper socks, walker, bedside commode  - Apply yellow socks and bracelet for high fall risk patients  - Consider moving patient to room near nurses station  Outcome: Progressing  Goal: Maintains/Returns to pre admission functional level  Description: INTERVENTIONS:  - Perform BMAT or MOVE assessment daily.   - Set and communicate daily mobility goal to care team and patient. - Collaborate with rehabilitation services on mobility goals if consulted  - Reposition patient every 2 hours.   - Dangle patient 3 times a day  - Stand patient 3 times a day  - Ambulate patient 3 times a day  - Out of bed to chair 3 times a day   - Out of bed for meals  - Out of bed for toileting  - Record patient progress and toleration of activity level   Outcome: Progressing     Problem: DISCHARGE PLANNING  Goal: Discharge to home or other facility with appropriate resources  Description: INTERVENTIONS:  - Identify barriers to discharge w/patient  - Arrange for needed discharge resources and transportation as appropriate  - Identify discharge learning needs   - Refer to Case Management Department for coordinating discharge planning if the patient needs post-hospital services based on physician/advanced practitioner order or complex needs related to functional status, cognitive ability, or social support system  Outcome: Progressing     Problem: Knowledge Deficit  Goal: Patient/family/caregiver demonstrates understanding of disease process, treatment plan, medications, and discharge instructions  Description: Complete learning assessment and assess knowledge base.   Interventions:  - Provide teaching at level of understanding  - Provide teaching via preferred learning methods  Outcome: Progressing     Problem: Prexisting or High Potential for Compromised Skin Integrity  Goal: Skin integrity is maintained or improved  Description: INTERVENTIONS:  - Identify patients at risk for skin breakdown  - Assess and monitor skin integrity  - Assess and monitor nutrition and hydration status  - Monitor labs   - Assess for incontinence   - Turn and reposition patient  - Assist with mobility/ambulation  - Relieve pressure over bony prominences  - Avoid friction and shearing  - Provide appropriate hygiene as needed including keeping skin clean and dry  - Evaluate need for skin moisturizer/barrier cream  - Collaborate with interdisciplinary team   - Patient/family teaching  - Consider wound care consult   Outcome: Progressing     Problem: Nutrition/Hydration-ADULT  Goal: Nutrient/Hydration intake appropriate for improving, restoring or maintaining nutritional needs  Description: Monitor and assess patient's nutrition/hydration status for malnutrition. Collaborate with interdisciplinary team and initiate plan and interventions as ordered. Monitor patient's weight and dietary intake as ordered or per policy. Utilize nutrition screening tool and intervene as necessary. Determine patient's food preferences and provide high-protein, high-caloric foods as appropriate.      INTERVENTIONS:  - Monitor oral intake, urinary output, labs, and treatment plans  - Assess nutrition and hydration status and recommend course of action  - Evaluate amount of meals eaten  - Do not leave patient alone while eating due to aspiration risk  - Assist patient with eating if necessary   - Allow adequate time for meals  - Recommend/ encourage appropriate diets, oral nutritional supplements, and vitamin/mineral supplements  - Order, calculate, and assess calorie counts as needed  - Assess need for intravenous fluids  - Provide specific nutrition/hydration education as appropriate  - Include patient in decisions related to nutrition  Outcome: Progressing     Problem: RESPIRATORY - ADULT  Goal: Achieves optimal ventilation and oxygenation  Description: INTERVENTIONS:  - Assess for changes in respiratory status  - Assess for changes in mentation and behavior  - Position to facilitate oxygenation and minimize respiratory effort  - Oxygen administered by appropriate delivery if ordered   - Encourage broncho-pulmonary hygiene including cough, deep breathe, Incentive Spirometry, flutter valve  - Assess the need for suctioning and aspirate as needed  - Assess and instruct to report SOB or any respiratory difficulty  - Respiratory Therapy support as indicated  Outcome: Progressing     Problem: GENITOURINARY - ADULT  Goal: Maintains or returns to baseline urinary function  Description: INTERVENTIONS:  - Assess urinary function  - Encourage oral fluids to ensure adequate hydration if ordered  - Administer IV fluids as ordered to ensure adequate hydration  - Administer ordered medications as needed  - Offer frequent toileting  - Follow urinary retention protocol if ordered  Outcome: Progressing  Goal: Urinary catheter remains patent  Description: INTERVENTIONS:  - Assess patency of urinary catheter  - Follow guidelines for intermittent irrigation of non-functioning urinary catheter  Outcome: Progressing     Problem: NEUROSENSORY - ADULT  Goal: Achieves stable or improved neurological status  Description: INTERVENTIONS  - Monitor and report changes in neurological status  - Monitor vital signs such as temperature, blood pressure, glucose, and any other labs ordered   - Initiate measures to prevent increased intracranial pressure  - Monitor for seizure activity and implement precautions if appropriate      Outcome: Progressing  Goal: Achieves maximal functionality and self care  Description: INTERVENTIONS  - Monitor swallowing and airway patency with patient fatigue and changes in neurological status  - Encourage and assist patient to increase activity and self care.    - Encourage visually impaired, hearing impaired and aphasic patients to use assistive/communication devices  Outcome: Progressing     Problem: GASTROINTESTINAL - ADULT  Goal: Maintains adequate nutritional intake  Description: INTERVENTIONS:  - Monitor percentage of each meal consumed  - Identify factors contributing to decreased intake, treat as appropriate  - Assist with meals as needed  - Monitor I&O, weight, and lab values if indicated  - Obtain nutrition services referral as needed  Outcome: Progressing  Goal: Establish and maintain optimal ostomy function  Description: INTERVENTIONS:  - Assess bowel function  - Encourage oral fluids to ensure adequate hydration  - Administer IV fluids if ordered to ensure adequate hydration   - Administer ordered medications as needed  - Encourage mobilization and activity  - Nutrition services referral to assist patient with appropriate food choices  - Assess stoma site  - Consider wound care consult   Outcome: Progressing     Problem: METABOLIC, FLUID AND ELECTROLYTES - ADULT  Goal: Electrolytes maintained within normal limits  Description: INTERVENTIONS:  - Monitor labs and assess patient for signs and symptoms of electrolyte imbalances  - Administer electrolyte replacement as ordered  - Monitor response to electrolyte replacements, including repeat lab results as appropriate  - Instruct patient on fluid and nutrition as appropriate  Outcome: Progressing  Goal: Fluid balance maintained  Description: INTERVENTIONS:  - Monitor labs   - Monitor I/O and WT  - Instruct patient on fluid and nutrition as appropriate  - Assess for signs & symptoms of volume excess or deficit  Outcome: Progressing  Goal: Glucose maintained within target range  Description: INTERVENTIONS:  - Monitor Blood Glucose as ordered  - Assess for signs and symptoms of hyperglycemia and hypoglycemia  - Administer ordered medications to maintain glucose within target range  - Assess nutritional intake and initiate nutrition service referral as needed  Outcome: Progressing     Problem: SKIN/TISSUE INTEGRITY - ADULT  Goal: Skin Integrity remains intact(Skin Breakdown Prevention)  Description: Assess:  -Assess extremities for adequate circulation and sensation     Bed Management:  -Have minimal linens on bed & keep smooth, unwrinkled  -Change linens as needed when moist or perspiring      Skin Care:  -Avoid use of baby powder, tape, friction and shearing, hot water or constrictive clothing    Outcome: Progressing  Goal: Incision(s), wounds(s) or drain site(s) healing without S/S of infection  Description: INTERVENTIONS  - Assess and document dressing, incision, wound bed, drain sites and surrounding tissue  - Provide patient and family education  Outcome: Progressing     Problem: HEMATOLOGIC - ADULT  Goal: Maintains hematologic stability  Description: INTERVENTIONS  - Assess for signs and symptoms of bleeding or hemorrhage  - Monitor labs  - Administer supportive blood products/factors as ordered and appropriate  Outcome: Progressing

## 2023-09-08 NOTE — DEATH NOTE
INPATIENT DEATH NOTE  Jabari Jaimes 70 y.o. female MRN: [id number]  Unit/Bed#: ICU 04 Encounter: [id number]    Date, Time and Cause of Death    Date of Death: 23  Time of Death:  4:29 PM  Preliminary Cause of Death: Multiple organ failure with heart failure ([address])  Entered by: Cr Rawls[[id number]]     Attribution     AG1. [address] MD 23 16:34           Patient's Information  Pronounced by: Marilyn Mcghee  Did the patient's death occur in the ED?: No  Did the patient's death occur in the OR?: No  Did the patient's death occur less than 10 days post-op?: No  Did the patient's death occur within 24 hours of admission?: No  Was code status DNR at the time of death?: Yes    PHYSICAL EXAM:  Unresponsive to noxious stimuli, Spontaneous respirations absent, Breath sounds absent, Carotid pulse absent, Heart sounds absent, Pupillary light reflex absent and Corneal blink reflex absent    Medical Examiner notification criteria:  NONE APPLICABLE   Medical Examiner's office notified?:  No, does not meet ME notification criteria   Medical Examiner accepted case?:  No  Name of Medical Examiner: N/A    Family Notification  Was the family notified?: Yes  Date Notified: 23  Time Notified:   Notified by: Tony Barron  Name of Family Notified of Death: Alycia Castleman   Relationship to Patient: Daughter  Family Notification Route:  At bedside  Was the family told to contact a  home?: No (pt's daughter has grieving and anxioius, will notify later)    Autopsy Options:  N/A    Primary Service Attending Physician notified?:  yes - Attending:  Swati Guerrero MD    Physician/Resident responsible for completing Discharge Summary:  Tony Barron

## 2023-09-08 NOTE — ASSESSMENT & PLAN NOTE
Most likely multifactorial, multiple comorbidities requiring infusions, cardiogenic shock with hypotension and kidney hypoperfusion, poor nutritional status and low albumin  S/a + 14  On 9/4 pt received Lasix 40 + 20 IV (produced 2660 cc of urine)  9/5 increased dose to Lasix 40 mg IV twice daily  9/6 pt was started on lasix drip (produced 4150 cc of urine), continued on 9/7 with 2825 cc urine output      Plan:  · Strict I's and O's  · Daily weights  · C/w Lasix ggt, will consider to switch to BID injections if continue to respond well

## 2023-09-08 NOTE — ASSESSMENT & PLAN NOTE
· Patient had right MCA stroke, s/p mechanical thrombectomy earlier this year  · Although the patient moves all the limbs, there is residual weakness in the left upper and lower limb  · CT head on August 8 shows No acute intracranial abnormality.  Repeat [facility] 8/31 - no acute changes    Plan:  · Will consider to restart Eliquis given improvement in liver function  · thrombocytopenia improving, heparin for DVT ppx  · Frequent neurochecks

## 2023-09-08 NOTE — PLAN OF CARE
Problem: Nutrition/Hydration-ADULT  Goal: Nutrient/Hydration intake appropriate for improving, restoring or maintaining nutritional needs  Description: Monitor and assess patient's nutrition/hydration status for malnutrition. Collaborate with interdisciplinary team and initiate plan and interventions as ordered. Monitor patient's weight and dietary intake as ordered or per policy. Utilize nutrition screening tool and intervene as necessary. Determine patient's food preferences and provide high-protein, high-caloric foods as appropriate.      INTERVENTIONS:  - Monitor oral intake, urinary output, labs, and treatment plans  - Assess nutrition and hydration status and recommend course of action  - Evaluate amount of meals eaten  - Do not leave patient alone while eating due to aspiration risk  - Assist patient with eating if necessary   - Allow adequate time for meals  - Recommend/ encourage appropriate diets, oral nutritional supplements, and vitamin/mineral supplements  - Order, calculate, and assess calorie counts as needed  - Assess need for intravenous fluids  - Provide specific nutrition/hydration education as appropriate  - Include patient in decisions related to nutrition  Outcome: Progressing     Problem: RESPIRATORY - ADULT  Goal: Achieves optimal ventilation and oxygenation  Description: INTERVENTIONS:  - Assess for changes in respiratory status  - Assess for changes in mentation and behavior  - Position to facilitate oxygenation and minimize respiratory effort  - Oxygen administered by appropriate delivery if ordered   - Encourage broncho-pulmonary hygiene including cough, deep breathe, Incentive Spirometry, flutter valve  - Assess the need for suctioning and aspirate as needed  - Assess and instruct to report SOB or any respiratory difficulty  - Respiratory Therapy support as indicated  Outcome: Progressing     Problem: GENITOURINARY - ADULT  Goal: Urinary catheter remains patent  Description: INTERVENTIONS:  - Assess patency of urinary catheter  - Follow guidelines for intermittent irrigation of non-functioning urinary catheter  Outcome: Progressing     Problem: Potential for Falls  Goal: Patient will remain free of falls  Description: INTERVENTIONS:  - Educate patient/family on patient safety including physical limitations  - Instruct patient to call for assistance with activity   - Consult OT/PT to assist with strengthening/mobility   - Keep Call bell within reach  - Keep bed low and locked with side rails adjusted as appropriate  - Keep care items and personal belongings within reach  - Initiate and maintain comfort rounds  - Make Fall Risk Sign visible to staff  - Offer Toileting every 2 Hours, in advance of need  - Initiate/Maintain bed/chair alarm  - Obtain necessary fall risk management equipment: bed/chair alarm, call bell, gripper socks, walker, bedside commode  - Apply yellow socks and bracelet for high fall risk patients  - Consider moving patient to room near nurses station  Outcome: Not Progressing     Problem: MOBILITY - ADULT  Goal: Maintain or return to baseline ADL function  Description: INTERVENTIONS:  - Educate patient/family on patient safety including physical limitations  - Instruct patient to call for assistance with activity   - Consult OT/PT to assist with strengthening/mobility   - Keep Call bell within reach  - Keep bed low and locked with side rails adjusted as appropriate  - Keep care items and personal belongings within reach  - Initiate and maintain comfort rounds  - Make Fall Risk Sign visible to staff  - Offer Toileting every 2 Hours, in advance of need  - Initiate/Maintain bed/chair alarm  - Obtain necessary fall risk management equipment: bed/chair alarm, call bell, gripper socks, walker, bedside commode  - Apply yellow socks and bracelet for high fall risk patients  - Consider moving patient to room near nurses station  Outcome: Not Progressing  Goal: Maintains/Returns to pre admission functional level  Description: INTERVENTIONS:  - Perform BMAT or MOVE assessment daily.   - Set and communicate daily mobility goal to care team and patient. - Collaborate with rehabilitation services on mobility goals if consulted  - Reposition patient every 2 hours.   - Dangle patient 3 times a day  - Stand patient 3 times a day  - Ambulate patient 3 times a day  - Out of bed to chair 3 times a day   - Out of bed for meals  - Out of bed for toileting  - Record patient progress and toleration of activity level   Outcome: Not Progressing     Problem: PAIN - ADULT  Goal: Verbalizes/displays adequate comfort level or baseline comfort level  Description: Interventions:  - Encourage patient to monitor pain and request assistance  - Assess pain using appropriate pain scale  - Administer analgesics based on type and severity of pain and evaluate response  - Implement non-pharmacological measures as appropriate and evaluate response  - Consider cultural and social influences on pain and pain management  - Notify physician/advanced practitioner if interventions unsuccessful or patient reports new pain  Outcome: Not Progressing     Problem: SAFETY ADULT  Goal: Patient will remain free of falls  Description: INTERVENTIONS:  - Educate patient/family on patient safety including physical limitations  - Instruct patient to call for assistance with activity   - Consult OT/PT to assist with strengthening/mobility   - Keep Call bell within reach  - Keep bed low and locked with side rails adjusted as appropriate  - Keep care items and personal belongings within reach  - Initiate and maintain comfort rounds  - Make Fall Risk Sign visible to staff  - Offer Toileting every 2 Hours, in advance of need  - Initiate/Maintain bed/chair alarm  - Obtain necessary fall risk management equipment: bed/chair alarm, call bell, gripper socks, walker, bedside commode  - Apply yellow socks and bracelet for high fall risk patients  - Consider moving patient to room near nurses station  Outcome: Not Progressing  Goal: Maintain or return to baseline ADL function  Description: INTERVENTIONS:  - Educate patient/family on patient safety including physical limitations  - Instruct patient to call for assistance with activity   - Consult OT/PT to assist with strengthening/mobility   - Keep Call bell within reach  - Keep bed low and locked with side rails adjusted as appropriate  - Keep care items and personal belongings within reach  - Initiate and maintain comfort rounds  - Make Fall Risk Sign visible to staff  - Offer Toileting every 2 Hours, in advance of need  - Initiate/Maintain bed/chair alarm  - Obtain necessary fall risk management equipment: bed/chair alarm, call bell, gripper socks, walker, bedside commode  - Apply yellow socks and bracelet for high fall risk patients  - Consider moving patient to room near nurses station  Outcome: Not Progressing  Goal: Maintains/Returns to pre admission functional level  Description: INTERVENTIONS:  - Perform BMAT or MOVE assessment daily.   - Set and communicate daily mobility goal to care team and patient. - Collaborate with rehabilitation services on mobility goals if consulted  - Reposition patient every 2 hours.   - Dangle patient 3 times a day  - Stand patient 3 times a day  - Ambulate patient 3 times a day  - Out of bed to chair 3 times a day   - Out of bed for meals  - Out of bed for toileting  - Record patient progress and toleration of activity level   Outcome: Not Progressing     Problem: Prexisting or High Potential for Compromised Skin Integrity  Goal: Skin integrity is maintained or improved  Description: INTERVENTIONS:  - Identify patients at risk for skin breakdown  - Assess and monitor skin integrity  - Assess and monitor nutrition and hydration status  - Monitor labs   - Assess for incontinence   - Turn and reposition patient  - Assist with mobility/ambulation  - Relieve pressure over bony prominences  - Avoid friction and shearing  - Provide appropriate hygiene as needed including keeping skin clean and dry  - Evaluate need for skin moisturizer/barrier cream  - Collaborate with interdisciplinary team   - Patient/family teaching  - Consider wound care consult   Outcome: Not Progressing     Problem: NEUROSENSORY - ADULT  Goal: Achieves stable or improved neurological status  Description: INTERVENTIONS  - Monitor and report changes in neurological status  - Monitor vital signs such as temperature, blood pressure, glucose, and any other labs ordered   - Initiate measures to prevent increased intracranial pressure  - Monitor for seizure activity and implement precautions if appropriate      Outcome: Not Progressing     Problem: SKIN/TISSUE INTEGRITY - ADULT  Goal: Skin Integrity remains intact(Skin Breakdown Prevention)  Description: Assess:  -Perform Paul assessment   -Clean and moisturize skin  -Inspect skin when repositioning, toileting, and assisting with ADLS  -Assess under medical devices  -Assess extremities for adequate circulation and sensation     Bed Management:  -Have minimal linens on bed & keep smooth, unwrinkled  -Change linens as needed when moist or perspiring  -Avoid sitting or lying in one position   -Keep HOB elevated     Toileting:  -Offer bedside commode  -Assess for incontinence  -Use incontinent care products after each incontinent episode    Activity:  -Mobilize patient   -Encourage activity and walks on unit  -Encourage or provide ROM exercises   -Turn and reposition patient   -Use appropriate equipment to lift or move patient in bed  -Instruct/ Assist with weight shifting  when out of bed in chair  -Consider limitation of chair     Skin Care:  -Avoid use of baby powder, tape, friction and shearing, hot water or constrictive clothing  -Relieve pressure over bony prominences   -Do not massage red bony areas    Next Step  -Teach patient strategies to minimize risks  -Consider consults to  interdisciplinary teams   Outcome: Not Progressing  Goal: Incision(s), wounds(s) or drain site(s) healing without S/S of infection  Description: INTERVENTIONS  - Assess and document dressing, incision, wound bed, drain sites and surrounding tissue  - Provide patient and family education  - Perform skin care/dressing changes   Outcome: Not Progressing     Problem: HEMATOLOGIC - ADULT  Goal: Maintains hematologic stability  Description: INTERVENTIONS  - Assess for signs and symptoms of bleeding or hemorrhage  - Monitor labs  - Administer supportive blood products/factors as ordered and appropriate  Outcome: Not Progressing

## 2023-09-08 NOTE — PROGRESS NOTES
Throughout the day patient had worsening hypotension, we increased her pressors, she became very tachypneic on the mechanical ventilation, chest x-ray showed improvement in aeration of both lungs and resolution of bibasilar atelectasis or consolidation. I believe her tachypnea is due to anxiety/agitation from being intubated and starting to wake up although she was not following any commands. We called the daughter and she came to bedside and I had discussion with her about the prognosis, she definitely wanted her mother to be comfortable and no further intervention, she understands the scope of that by stopping every support including vasopressors and mechanical ventilation and that her mom will pass away, for now she wants to wait for her brother to stop by, I offered to start sedation for now to keep her comfortable and she agreed. I will start fentanyl infusion. We will initiate comfort measures/withdrawal of care when she is ready.     Added critical care time 15 minutes

## 2023-09-08 NOTE — ASSESSMENT & PLAN NOTE
Recent Labs     09/06/23  0443 09/07/23  0453   HGB 9.0* 9.7*     8/31 Hgb noted to be 6.4.  S/p 1 unit of PRBCs   Today hgb is still pending  No signs or symptoms of GI bleed, bruisers over her knees bilaterally and upper extremities  DIC labs showed decreased fibrinogen but no fibrinogen split products, no schistocytes, LDH and haptoglobin normal  CT C/A/P no signs of internal bleeding  DVT doppler LE no DVT  · Monitor HGB, transfuse as needed

## 2023-09-08 NOTE — PROCEDURES
Arterial Line Insertion    Date/Time: 9/8/2023 12:00 PM    Performed by: Adore Carranza MD  Authorized by: Adore Carranza MD    Patient location:  Bedside  Other Assisting Provider: No    Consent:     Consent obtained:  Emergent situation  Universal protocol:     Required blood products, implants, devices, and special equipment available: yes      Patient identity confirmed:  Hospital-assigned identification number and arm band  Anesthesia (see MAR for exact dosages): Anesthesia method:  Local infiltration    Local anesthetic:  Lidocaine 1% w/o epi  Procedure details:     Location / Tip of Catheter:  Femoral    Laterality:  Right    Placement technique:  Ultrasound guided    Ultrasound image availability:  Not saved    Sterile ultrasound techniques: Sterile gel and sterile probe covers were used      Number of attempts:  1    Successful placement: yes      Transducer: waveform confirmed    Post-procedure details:     Post-procedure:  Sutured and sterile dressing applied    Patient tolerance of procedure:   Tolerated well, no immediate complications

## 2023-09-08 NOTE — ASSESSMENT & PLAN NOTE
· no ativan  · On wellbutrin and duloxetine  · Wellbutrin was stopped secondary to hepatic toxicity risk  · cont gabapentin and lamictal. Lower dose started 2/2 sedation and uptitrate as able

## 2023-09-08 NOTE — ASSESSMENT & PLAN NOTE
Patient is a 67-year-old female  who was status postcardiac arrest with ROSC,   · 8/29 It appeared patient became bradycardic and agonal respiration, patient arrested and CPR and ACLS protocol -CPR was started received epinephrine x 3, mag sulfate, bicarb x 2 , fibrillation, serum chloride dextrose presents from 927 to 939  · Patient now is a DNR level 2   · Milrinone and NE started on 8/29, Vasopressin replaced NE on 8/31.  Milrinone and vasopressin  DC'd on 9/2  · Midodrine added for borderline BP on 9/6  · 9/7 repeat ECHO showed EF 35%

## 2023-09-08 NOTE — ASSESSMENT & PLAN NOTE
· The patient had a slight transaminitis during admission but over the past few days has steadily risen and then doubled on 8/26 with his AST 2800, ALT 1114  · Severe elevation of INR, out of proportion to liver injury  · Multifactorial - She was found to have a slightly elevated tylenol level (that continues to normalize) which may be a contributing factor however, not thought to be the only issue (ischemic vs viral hepatitis)  · She has HF, which may contribute to hepatotoxicity  · Additionally she was on a few drugs (Plaquinel, wellbutrin and lamictal) which can all be associated with liver failure  · Plaquinel and Wellbutrin held  · Doppler studies were normal, no thrombus  · Hepatitis panel on 8.24 was positive for hep C antibodies otherwise negative  · EBV ab/ag positive  · HSV, and VZV negative  · S/p acyclovir   · GI is following- appreciate their assistance with her care  · S/p NAC and fomepizole, dc on  8/28 as per Medical Toxicology  · Supportive care  · Vitamin K supplementation  · She would not be a candidate for a liver transplant  Recent Labs     09/06/23  0443   AST 36   ALT 37   ALKPHOS 70   TBILI 1.86*   BILIDIR 0.84*     · LFTs trended to normal range.  T Bili elevated, mixed picture with both direct and indirect bili elevation, improving

## 2023-09-08 NOTE — DISCHARGE SUMMARY
Discharge Summary - Meg Boyle 70 y.o. female MRN: [id number]    Unit/Bed#: ICU 04 Encounter: [id number] PCP: Jesse Pratt DO    Admission Date:   Admission Orders (From admission, onward)     Ordered        08/11/23 0039  Inpatient Admission  Once            08/10/23 2133  INPATIENT ADMISSION  Once,   Status:  Canceled                        Admitting Diagnosis: Altered mental state [R41.82]  Respiratory distress [R06.03]  Hypertension [I10]  Encephalopathy [G93.40]  History of CVA (cerebrovascular accident) [Z86.73]  Acute respiratory failure with hypoxia ([address]) [J96.01]  Sepsis ([address]) [A41.9]    HPI/Summary of Hospital Course: A 70 y.o. female who presents with history of stroke status post left MCA thrombectomy in 2/23, closed T12 fracture with LSO brace, chronic T9, T11 fracturess, kyphosis, bipolar, osteoporosis, dara, asthma, gerd, chf with EF 30% and moderate AR, s/p fall on 6/30 and admission on 8/7/23 who was brought to ER by ems for worsening MS and hypoxia. Pt stated that she wears 2L O2 at home. Pt was initially put on NC and then midflow and transitioned to bipap in ER. Pt was awake, alert and able to write. Pt was treated for sepsis with  Zosyn and given ativan and pt progressively got more tired. Pt was admitted to step down for bipap monitoring. Given suspicion for aspiration pneumonia, post stroke dysarthria and hyperextension of the neck due to severe RA speech and swallowing consult was placed and pt was identified as a high risk for aspiration. PEG tube was placed on 8/17 by GI team. Pt was upgraded to ICU due to worening mental status and was found to have acute liver failure with transaminates. Most likely MAX was multifactorial given hypotension in settings of sepsis, decreased EF to 30%, multiple home medications for chronic disease metabolized in liver and tylenol given for back pain. Hepatitis panel was indicative of Hep C antibody but no active hepatitis process.  Infectious work up was negative for HSV, EBV antibody was suggestive of possible previous EBV infection however no active infection. Patient received Acyclovir for 3 days and it was discontinued when work up was finalized. Toxicology evaluated the patient and recommended treatment with NAC and fomepizole. Eliquis, Plaquenil and Wellbutrin was placed on hold. Liver function slowly recovered and transaminates resolved. On 8/29 patient had cardiac arrest required intubation, pt was in V-fib and Defibrillated x 2 had, returned of spontaneous circulation after 4 rounds of epinephrine and 3 of bicarb. Patient was switched to DNR level of care. PT was started on Levophed and Fentanyl. Post cardiac arrest ECHO showed EF - 15 %. Mental status changed, fentanyl was used for pain management with no sedation. Repeat CT head without contrast showed no acute changes. On 8/30 patient was started on Milrinone with Vasopressin, Levophed was discontinued. Additionally pt was receiving Lasix for volume overload with poor response after cardiac arrest. Hemoglobin dropped was noticed on 8/31 and pt was transfused, no source of active bleeding was identified, CT AP was performed however no retroperitoneal hematoma or other bleeding were identified. Platelets drop was noticed. Hemolysis panel, DIC panel was negative. Peripheral smear didn't show and signs of hemolysis as well. On 9/2 pt was found to be in AFIB with RVR with HR to 150, vasopressin, milrinone were discontinued. Patient was loaded with Digoxin and started on  maintenance dose. On 9/4 pt spiked fever and septic work up was initiated, Vancomycin and Meropenem was given for 1 dose followed by Ceftriaxone for 4 additional days. Ugalde was exchanged, central line and arterial line was removed. Blood culture and urine culture showed no growth of bacteria.  Autoimmune w/u was initiated to clarify any immunologic process could be the cause of multiple organ failure: Sjogren's antibody, anti-double-stranded antibody, ANABELLA, antimyeloperoxidase antibody, antiproteinase 3 antibody - was negative. Rheumatoid factor was increased, C3-C4 decreased, control platelet fraction was increased. Patient was started on steroids during ongoing autoimmune work up. Patient continued to spike fever managed with cooling blanket and antibiotics. On  pt was started on lasix ggt and midodrine with good response. Mental status slightly improved on , CT head without contrast was repeated and showed no acute brain pathology. Repeat ECHO showed improvement in EF to 45%. On  patient spiked fever again, in settings of leucocytosis and hypotension antibiotics were upgraded to Cefepime and Vancomycin, A-line was placed, Levophophed was started, steroids and lasix ggt were discontinued, set of blood culture were sent. This afternoon patient become more hypotensive, pt received epinephrine, bicarbonate and calcium gluconate, was started on Vasopressin infusion with improvement in blood pressure. Pt's daughter was notified. Later in the day pt's hypotension got progressively worst despite of maximum of pressors administration. Pt  at 1629 pm.  Patients daughter was present at the bedside at the time of death. Death was confirmed by physical examination: pt was found to be unresponsive to noxious stimuli, no pulse was identified, ECG showed asystole, carotid pulse- absent, heart sounds - absent, pupillary light reflex and Corneal blink reflex - absent, pupil was fixed non reactive. Ventilator was discontinued and no breathing sounds were identified on auscultation, spontaneous respirations was absent. Time of death was pronounced at [phone].         Procedures Performed:   Orders Placed This Encounter   Procedures   • Central Line   • Critical Care   • ED ECG Documentation Only   • Intubation   • Midline Insertion         Significant Findings, Care, Treatment and Services Provided:   IP CONSULT TO CASE MANAGEMENT  IP CONSULT TO NUTRITION SERVICES  IP CONSULT TO GASTROENTEROLOGY  IP CONSULT TO PICC TEAM  IP CONSULT TO NUTRITION SERVICES  IP CONSULT TO TOXICOLOGY  IP CONSULT TO PHARMACY  IP CONSULT TO PICC TEAM  IP CONSULT TO PHARMACY     Complications: multiple organ failure    Disposition:        Medical Problems     Resolved Problems  Date Reviewed: 2023          Resolved    Sepsis ([address]) 2023     Resolved by  Abdulaziz Tee PA-C    Electrolyte imbalance 2023     Resolved by  Denise Marvin PA-C          Condition at Time of Death:     Date, Time and Cause of Death    Date of Death: 23  Time of Death:  4:29 PM  Preliminary Cause of Death: Multiple organ failure with heart failure ([address])  Entered by: Cr Rawls[AG1.1]     Attribution     [id number]. [id number] PRADEEP Burden MD 23 16:34          Death Note:    INPATIENT DEATH NOTE  Mariela Pineda 70 y.o. female MRN: [id number]  Unit/Bed#: ICU 04 Encounter: [id number]    Date, Time and Cause of Death    Date of Death: 23  Time of Death:  4:29 PM  Preliminary Cause of Death: Multiple organ failure with heart failure ([address])  Entered by: Ronald Rawls[AG1.1]     Attribution     AG1.1 Corrina Gallegos MD 23 16:34           Patient's Information  Pronounced by: Kell Andrade  Did the patient's death occur in the ED?: No  Did the patient's death occur in the OR?: No  Did the patient's death occur less than 10 days post-op?: No  Did the patient's death occur within 24 hours of admission?: No  Was code status DNR at the time of death?: Yes    PHYSICAL EXAM:  Unresponsive to noxious stimuli, Spontaneous respirations absent, Breath sounds absent, Carotid pulse absent, Heart sounds absent, Pupillary light reflex absent and Corneal blink reflex absent    Medical Examiner notification criteria:  NONE APPLICABLE   Medical Examiner's office notified?:  No, does not meet ME notification criteria   Medical Examiner accepted case?: No  Name of Medical Examiner: N/A    Family Notification  Was the family notified?: Yes  Date Notified: 23  Time Notified: [id number]  Notified by: María Eugene  Name of Family Notified of Death: Genaro Chinchilla   Relationship to Patient: Daughter  Family Notification Route:  At bedside  Was the family told to contact a  home?: No (pt's daughter has grieving and anxioius, will notify later)    Autopsy Options:  N/A    Primary Service Attending Physician notified?:  yes - Attending:  Olga Lidia Witt MD    Physician/Resident responsible for completing Discharge Summary:  María Eugene

## 2023-09-08 NOTE — ASSESSMENT & PLAN NOTE
· Patient was brought to the ED after being in altered mental status, requiring more oxygen. · History of multiple episodes of vomiting at night prior to admission  · Initially admitted with aspiration pneumonia, treated with a course of antibiotics and improved  · Remained on oxygen therapy, but developed a change in MS and required transfer back to the ICU  · She was noted to have an increase WOB and was placed on BIPAP  · This current episode does not appear to be related to an acute lung issue but more likely related to acute hepatic failure  · Patient is status postcardiac arrest on 8/30, was intubated   · CT H - no change from prior  · 9/4 proCal is trending up, pt spiked fever, pt met SIRS criteria, repeat CXR showed opacities, pt started on Vanc/Meropenem, transitioned to CFX/Vanc. Central and femoral lines removed, Ugalde exchanged. We'll follow Bx and consider to exchange midline.     Plan:  · Vent : 16/400/6/60%   · Goal SpO2 > 92%  · Pt had trial on pressure support mode on 9/6 and 9/7 and did well, if mental status improving will consider extubation

## 2023-09-09 LAB
BACTERIA BLD CULT: NORMAL
BACTERIA BLD CULT: NORMAL

## 2023-09-10 LAB
CARDIOLIPIN IGA SER IA-ACNC: 5.2
CARDIOLIPIN IGG SER IA-ACNC: 6.8
CARDIOLIPIN IGM SER IA-ACNC: 4.1
CCP AB SER IA-ACNC: 4

## 2023-09-13 LAB — BACTERIA BLD CULT: NORMAL

## 2023-11-06 NOTE — PROGRESS NOTES
Internal Medicine Progress Note  Patient: Ainsley Lopez  Age/sex: 70 y.o. female  Medical Record #: [id number]      ASSESSMENT/PLAN: (Interval History)  Ainsley Lopez is seen and examined and management for following issues:    Left lateral thigh hematoma   • Stable     ABLA  • S/p 1 unit PRBCs 7/2/23  • CBC 7/18/23 stable     Thrush  • S/p Nystatin     Chronic combined systolic/diastolic heart failure   • Originally diagnosed 2004 when LVEF then was 10-15% and felt to be NICM  • Her LVEF did recover into 50's then fell when she had her CVA in 2/2023 to 30%  • Also has moderate AI/gr 2 DD  • Sees Dr Caprice Alvarenga a [facility] in 2/2023 when LVEF was found to be 30% which is most current  • Had been on Coreg in past but HF team switched her to Toprol when she had her stroke 2/2023  • Her soft BPs have limited starting her on ACEI/ARB as OP  • Home:  Toprol XL 25mg BID/Aldactone 25mg qd  • Here:  Toprol XL 25 mg QD = able to get last 4 days but previously missed doses 2/2 low BPs  • D/C'd Aldactone 7/16  • stable     Hypercalcemia  • Continue Cinacalcet here as was taking at home  • Calcium 8.9 on 7/18/23     HS hypoxia   • using 4L NC HS at home  • Not using O2 at night here     Nightmares  • placed on Minipress when here with us after her CVA  • D/w Dr Kala Espinoza, d/c'd since had been too low to get  • Minipress dc'd 2/2 hypotension     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  • Has residual left sided weakness  • Had recrudescence of CVA 4/2023 with worsening left facial droop  • Continue Eliquis     Dysphagia  • Had admission for aspiration PNA at [facility] 5/2023 tx'd with Levaquin and Flagyl  • Continue pureed diet with NTL as at home  • ST following    Weight loss  • Continue Ensure HP pudding TID and Magic cups TID  • She was on no supplements at home so likely will gain some wt that she is eating/drinking supps here  • adamantly refuses PEG tube  • Nutrition following     Bipolar depression  • Home:  Wellbutrin 100mg BID/Cymbalta 60mg BID/Lamictal 150mg qhs  • Here:  Wellbutrin 100mg BID/Cymbalta 60mg BID/Lamictal 150mg qhs     Rheumatoid arthritis  • Continue Plaquenil 200mg BID as at home     Chronic pain/chronic opioid use  • 2/2 RA and lumbar spondylosis  • Pain management per PMR     Hx incidental right superior hypophyseal artery aneurysm  • Follows with NS Dr Deveron Seip     CRE urine 3/2023  • Maintain contact isolation     Dye allergy   • Was dye prepped on 7/4/23 with Benadryl 50mg IV x 1 w/o issues     Incidental findings  • Pancolitis with increasing inflammatory free fluid in pelvis  • Calcified granuloma RLL     Hypokalemia  • Got Kdur 40 meq x 2 on 7/8/23  • Stable at 4.3     Enterococcus UTI  • S/p Joleen Hahn Dr Depadua = sensitive        Discharge date:  7/21/23       The above assessment and plan was reviewed and updated as determined by my evaluation of the patient on 7/19/2023.     Labs:   Results from last 7 days   Lab Units 07/18/23  0516 07/13/23  0648   WBC Thousand/uL 5.07 5.95   HEMOGLOBIN g/dL 9.9* 10.1*   HEMATOCRIT % 30.9* 32.3*   PLATELETS Thousands/uL 220 295     Results from last 7 days   Lab Units 07/18/23  0516 07/13/23  0648   SODIUM mmol/L 143 140   POTASSIUM mmol/L 4.3 4.1   CHLORIDE mmol/L 109* 112*   CO2 mmol/L 30 26   BUN mg/dL 18 17   CREATININE mg/dL 0.84 0.83   CALCIUM mg/dL 8.9 8.4                   Review of Scheduled Meds:  Current Facility-Administered Medications   Medication Dose Route Frequency Provider Last Rate   • acetaminophen  650 mg Oral Q6H [address] Nancy Villegas MD     • albuterol  2 puff Inhalation Q6H PRN Nancy Villegas MD     • apixaban  5 mg Oral BID Nancy Villegas MD     • buPROPion  100 mg Oral BID Nancy Villegas MD     • cinacalcet  30 mg Oral Daily With Breakfast Nancy Villegas MD     • Diclofenac Sodium  2 g Topical 4x Daily PRN Nancy Villegas MD     • DULoxetine  60 mg Oral BID Nancy Villegas MD     • gabapentin  400 mg Oral BID Nancy Villegas, MD     • hydroxychloroquine  200 mg Oral BID With Meals Hilario Wilson MD     • lamoTRIgine  150 mg Oral HS Hilario Wilson MD     • lidocaine  1 patch Topical Daily LAURA Rutledge     • magnesium Oxide  400 mg Oral BID Hilario Wilson MD     • melatonin  3 mg Oral HS Hilario Wilson MD     • metoprolol succinate  25 mg Oral Daily LAURA Sahu     • morphine  7.5 mg Oral BID PRN LAURA Davis     • ondansetron  4 mg Oral Q6H PRN Hilario Wilson MD     • pantoprazole  40 mg Oral Early Morning Hilario Wilson MD     • polyethylene glycol  17 g Oral Daily PRN LAURA Rutledge     • simethicone  80 mg Oral 4x Daily (with meals and at bedtime) LAURA Jean-Baptiste         Subjective/ HPI: Patient seen and examined. Patients overnight issues or events were reviewed with nursing or staff during rounds or morning huddle session. New or overnight issues include the following:     No new or overnight issues. Offers no complaints    ROS:   A 10 point ROS was performed; negative except as noted above.        Imaging:     No orders to display       *Labs /Radiology studies reviewed  *Medications reviewed and reconciled as needed  *Please refer to order section for additional ordered labs studies  *Case discussed with primary attending during morning huddle case rounds    Physical Examination:  Vitals:   Vitals:    07/18/23 2053 07/19/23 0500 07/19/23 0900 07/19/23 1140   BP: 97/62 112/58 110/64 108/62   BP Location: Right arm Right arm Right arm Right arm   Pulse: 104 87 96 90   Resp: 18 19 18 18   Temp: 97.8 °F (36.6 °C) 97.7 °F (36.5 °C)     TempSrc: Oral Axillary     SpO2: 93% 95%     Weight:  53.3 kg (117 lb 8 oz)     Height:           General Appearance: no distress, conversive  HEENT: PERRLA, conjuctiva normal; oropharynx clear; mucous membranes moist   Neck:  Supple, normal ROM  Lungs: CTA, normal respiratory effort, no retractions, expiratory effort normal  CV: regular rate and rhythm; no rubs/murmurs/gallops, PMI normal   ABD: soft; ND/NT; +BS  EXT: trace LE edema  Skin: normal turgor, normal texture, no rashes  Psych: affect normal, mood normal  Neuro: AAO      The above physical exam was reviewed and updated as determined by my evaluation of the patient on 7/19/2023. Invasive Devices     None                    VTE Pharmacologic Prophylaxis: Eliquis  Code Status: Level 1 - Full Code  Current Length of Stay: 12 day(s)      Total time spent:  30 minutes with more than 50% spent counseling/coordinating care. Counseling includes discussion with patient re: progress  and discussion with patient of his/her current medical state/information. Coordination of patient's care was performed in conjunction with primary service. Time invested included review of patient's labs, vitals, and management of their comorbidities with continued monitoring. In addition, this patient was discussed with medical team including physician and advanced extenders. The care of the patient was extensively discussed and appropriate treatment plan was formulated unique for this patient. Medical decision making for the day was made by supervising physician unless otherwise noted in their attestation statement. ** Please Note:  voice to text software may have been used in the creation of this document.  Although proof errors in transcription or interpretation are a potential of such software** Hpi Title: Evaluation of a Skin Lesion Have Your Spot(S) Been Treated In The Past?: has not been treated

## 2024-01-03 NOTE — NURSING NOTE
Called report to RN [employer]   Discharge instructions & copies of chart printed ,3 envelopes sent with the pt's daughter   Dr Carey Aguillon coming to answer a few questions from the daughter before leaving  Double book my 4:00 on the 9th.  I recommend a UA, TSHr, B12, folate, vitamin D and A1c in the meantime.  Dx:  altered mental status, anemia and elevated blood sugar.

## 2024-09-16 NOTE — PLAN OF CARE
Problem: Potential for Falls  Goal: Patient will remain free of falls  Description: INTERVENTIONS:  - Educate patient/family on patient safety including physical limitations  - Instruct patient to call for assistance with activity   - Consult OT/PT to assist with strengthening/mobility   - Keep Call bell within reach  - Keep bed low and locked with side rails adjusted as appropriate  - Keep care items and personal belongings within reach  - Initiate and maintain comfort rounds  - Make Fall Risk Sign visible to staff  - Offer Toileting every 2 Hours, in advance of need  - Initiate/Maintain bed/chair alarm  - Obtain necessary fall risk management equipment: bed/chair alarm, call bell, gripper socks, walker, bedside commode  - Apply yellow socks and bracelet for high fall risk patients  - Consider moving patient to room near nurses station  Outcome: Progressing     Problem: MOBILITY - ADULT  Goal: Maintain or return to baseline ADL function  Description: INTERVENTIONS:  - Educate patient/family on patient safety including physical limitations  - Instruct patient to call for assistance with activity   - Consult OT/PT to assist with strengthening/mobility   - Keep Call bell within reach  - Keep bed low and locked with side rails adjusted as appropriate  - Keep care items and personal belongings within reach  - Initiate and maintain comfort rounds  - Make Fall Risk Sign visible to staff  - Offer Toileting every 2 Hours, in advance of need  - Initiate/Maintain bed/chair alarm  - Obtain necessary fall risk management equipment: bed/chair alarm, call bell, gripper socks, walker, bedside commode  - Apply yellow socks and bracelet for high fall risk patients  - Consider moving patient to room near nurses station  Outcome: Progressing  Goal: Maintains/Returns to pre admission functional level  Description: INTERVENTIONS:  - Perform BMAT or MOVE assessment daily.   - Set and communicate daily mobility goal to care team and patient. - Collaborate with rehabilitation services on mobility goals if consulted  - Reposition patient every 2 hours.   - Dangle patient 3 times a day  - Stand patient 3 times a day  - Ambulate patient 3 times a day  - Out of bed to chair 3 times a day   - Out of bed for meals  - Out of bed for toileting  - Record patient progress and toleration of activity level   Outcome: Progressing     Problem: PAIN - ADULT  Goal: Verbalizes/displays adequate comfort level or baseline comfort level  Description: Interventions:  - Encourage patient to monitor pain and request assistance  - Assess pain using appropriate pain scale  - Administer analgesics based on type and severity of pain and evaluate response  - Implement non-pharmacological measures as appropriate and evaluate response  - Consider cultural and social influences on pain and pain management  - Notify physician/advanced practitioner if interventions unsuccessful or patient reports new pain  Outcome: Progressing     Problem: INFECTION - ADULT  Goal: Absence or prevention of progression during hospitalization  Description: INTERVENTIONS:  - Assess and monitor for signs and symptoms of infection  - Monitor lab/diagnostic results  - Monitor all insertion sites, i.e. indwelling lines, tubes, and drains  - Monitor endotracheal if appropriate and nasal secretions for changes in amount and color  - [place] appropriate cooling/warming therapies per order  - Administer medications as ordered  - Instruct and encourage patient and family to use good hand hygiene technique  - Identify and instruct in appropriate isolation precautions (contact isolation for CRE) for identified infection/condition  Outcome: Progressing     Problem: SAFETY ADULT  Goal: Patient will remain free of falls  Description: INTERVENTIONS:  - Educate patient/family on patient safety including physical limitations  - Instruct patient to call for assistance with activity   - Consult OT/PT to assist with strengthening/mobility   - Keep Call bell within reach  - Keep bed low and locked with side rails adjusted as appropriate  - Keep care items and personal belongings within reach  - Initiate and maintain comfort rounds  - Make Fall Risk Sign visible to staff  - Offer Toileting every 2 Hours, in advance of need  - Initiate/Maintain bed/chair alarm  - Obtain necessary fall risk management equipment: bed/chair alarm, call bell, gripper socks, walker, bedside commode  - Apply yellow socks and bracelet for high fall risk patients  - Consider moving patient to room near nurses station  Outcome: Progressing  Goal: Maintain or return to baseline ADL function  Description: INTERVENTIONS:  - Educate patient/family on patient safety including physical limitations  - Instruct patient to call for assistance with activity   - Consult OT/PT to assist with strengthening/mobility   - Keep Call bell within reach  - Keep bed low and locked with side rails adjusted as appropriate  - Keep care items and personal belongings within reach  - Initiate and maintain comfort rounds  - Make Fall Risk Sign visible to staff  - Offer Toileting every 2 Hours, in advance of need  - Initiate/Maintain bed/chair alarm  - Obtain necessary fall risk management equipment: bed/chair alarm, call bell, gripper socks, walker, bedside commode  - Apply yellow socks and bracelet for high fall risk patients  - Consider moving patient to room near nurses station  Outcome: Progressing  Goal: Maintains/Returns to pre admission functional level  Description: INTERVENTIONS:  - Perform BMAT or MOVE assessment daily.   - Set and communicate daily mobility goal to care team and patient. - Collaborate with rehabilitation services on mobility goals if consulted  - Reposition patient every 2 hours.   - Dangle patient 3 times a day  - Stand patient 3 times a day  - Ambulate patient 3 times a day  - Out of bed to chair 3 times a day   - Out of bed for meals  - Out of bed for toileting  - Record patient progress and toleration of activity level   Outcome: Progressing     Problem: DISCHARGE PLANNING  Goal: Discharge to home or other facility with appropriate resources  Description: INTERVENTIONS:  - Identify barriers to discharge w/patient  - Arrange for needed discharge resources and transportation as appropriate  - Identify discharge learning needs   - Refer to Case Management Department for coordinating discharge planning if the patient needs post-hospital services based on physician/advanced practitioner order or complex needs related to functional status, cognitive ability, or social support system  Outcome: Progressing     Problem: Knowledge Deficit  Goal: Patient/family/caregiver demonstrates understanding of disease process, treatment plan, medications, and discharge instructions  Description: Complete learning assessment and assess knowledge base.   Interventions:  - Provide teaching at level of understanding  - Provide teaching via preferred learning methods  Outcome: Progressing     Problem: Prexisting or High Potential for Compromised Skin Integrity  Goal: Skin integrity is maintained or improved  Description: INTERVENTIONS:  - Identify patients at risk for skin breakdown  - Assess and monitor skin integrity  - Assess and monitor nutrition and hydration status  - Monitor labs   - Assess for incontinence   - Turn and reposition patient  - Assist with mobility/ambulation  - Relieve pressure over bony prominences  - Avoid friction and shearing  - Provide appropriate hygiene as needed including keeping skin clean and dry  - Evaluate need for skin moisturizer/barrier cream  - Collaborate with interdisciplinary team   - Patient/family teaching  - Consider wound care consult   Outcome: Progressing     Problem: Nutrition/Hydration-ADULT  Goal: Nutrient/Hydration intake appropriate for improving, restoring or maintaining nutritional needs  Description: Monitor and assess patient's nutrition/hydration status for malnutrition. Collaborate with interdisciplinary team and initiate plan and interventions as ordered. Monitor patient's weight and dietary intake as ordered or per policy. Utilize nutrition screening tool and intervene as necessary. Determine patient's food preferences and provide high-protein, high-caloric foods as appropriate.      INTERVENTIONS:  - Monitor oral intake, urinary output, labs, and treatment plans  - Assess nutrition and hydration status and recommend course of action  - Evaluate amount of meals eaten  - Do not leave patient alone while eating due to aspiration risk  - Assist patient with eating if necessary   - Allow adequate time for meals  - Recommend/ encourage appropriate diets, oral nutritional supplements, and vitamin/mineral supplements  - Order, calculate, and assess calorie counts as needed  - Assess need for intravenous fluids  - Provide specific nutrition/hydration education as appropriate  - Include patient in decisions related to nutrition  Outcome: Progressing     Problem: RESPIRATORY - ADULT  Goal: Achieves optimal ventilation and oxygenation  Description: INTERVENTIONS:  - Assess for changes in respiratory status  - Assess for changes in mentation and behavior  - Position to facilitate oxygenation and minimize respiratory effort  - Oxygen administered by appropriate delivery if ordered   - Encourage broncho-pulmonary hygiene including cough, deep breathe, Incentive Spirometry, flutter valve  - Assess the need for suctioning and aspirate as needed  - Assess and instruct to report SOB or any respiratory difficulty  - Respiratory Therapy support as indicated  Outcome: Progressing     Problem: GENITOURINARY - ADULT  Goal: Maintains or returns to baseline urinary function  Description: INTERVENTIONS:  - Assess urinary function  - Encourage oral fluids to ensure adequate hydration if ordered  - Administer IV fluids as ordered to ensure adequate hydration  - Administer ordered medications as needed  - Offer frequent toileting  - Follow urinary retention protocol if ordered  Outcome: Progressing  Goal: Urinary catheter remains patent  Description: INTERVENTIONS:  - Assess patency of urinary catheter  - Follow guidelines for intermittent irrigation of non-functioning urinary catheter  Outcome: Progressing     Problem: NEUROSENSORY - ADULT  Goal: Achieves stable or improved neurological status  Description: INTERVENTIONS  - Monitor and report changes in neurological status  - Monitor vital signs such as temperature, blood pressure, glucose, and any other labs ordered   - Initiate measures to prevent increased intracranial pressure  - Monitor for seizure activity and implement precautions if appropriate      Outcome: Progressing  Goal: Achieves maximal functionality and self care  Description: INTERVENTIONS  - Monitor swallowing and airway patency with patient fatigue and changes in neurological status  - Encourage and assist patient to increase activity and self care.    - Encourage visually impaired, hearing impaired and aphasic patients to use assistive/communication devices  Outcome: Progressing     Problem: GASTROINTESTINAL - ADULT  Goal: Maintains adequate nutritional intake  Description: INTERVENTIONS:  - Monitor percentage of each meal consumed  - Identify factors contributing to decreased intake, treat as appropriate  - Assist with meals as needed  - Monitor I&O, weight, and lab values if indicated  - Obtain nutrition services referral as needed  Outcome: Progressing  Goal: Establish and maintain optimal ostomy function  Description: INTERVENTIONS:  - Assess bowel function  - Encourage oral fluids to ensure adequate hydration  - Administer IV fluids if ordered to ensure adequate hydration   - Administer ordered medications as needed  - Encourage mobilization and activity  - Nutrition services referral to assist patient with appropriate food choices  - Assess stoma site  - Consider wound care consult   Outcome: Progressing     Problem: METABOLIC, FLUID AND ELECTROLYTES - ADULT  Goal: Electrolytes maintained within normal limits  Description: INTERVENTIONS:  - Monitor labs and assess patient for signs and symptoms of electrolyte imbalances  - Administer electrolyte replacement as ordered  - Monitor response to electrolyte replacements, including repeat lab results as appropriate  - Instruct patient on fluid and nutrition as appropriate  Outcome: Progressing  Goal: Fluid balance maintained  Description: INTERVENTIONS:  - Monitor labs   - Monitor I/O and WT  - Instruct patient on fluid and nutrition as appropriate  - Assess for signs & symptoms of volume excess or deficit  Outcome: Progressing  Goal: Glucose maintained within target range  Description: INTERVENTIONS:  - Monitor Blood Glucose as ordered  - Assess for signs and symptoms of hyperglycemia and hypoglycemia  - Administer ordered medications to maintain glucose within target range  - Assess nutritional intake and initiate nutrition service referral as needed  Outcome: Progressing     Problem: SKIN/TISSUE INTEGRITY - ADULT  Goal: Skin Integrity remains intact(Skin Breakdown Prevention)  Description: Assess:  -Assess extremities for adequate circulation and sensation     Bed Management:  -Have minimal linens on bed & keep smooth, unwrinkled  -Change linens as needed when moist or perspiring    Toileting:  -Offer bedside commode    Skin Care:  -Avoid use of baby powder, tape, friction and shearing, hot water or constrictive clothing    Outcome: Progressing  Goal: Incision(s), wounds(s) or drain site(s) healing without S/S of infection  Description: INTERVENTIONS  - Assess and document dressing, incision, wound bed, drain sites and surrounding tissue  - Provide patient and family education  Outcome: Progressing     Problem: HEMATOLOGIC - ADULT  Goal: Maintains hematologic stability  Description: INTERVENTIONS  - Assess for signs and symptoms of bleeding or hemorrhage  - Monitor labs  - Administer supportive blood products/factors as ordered and appropriate  Outcome: Progressing     Problem: SAFETY,RESTRAINT: NV/NON-SELF DESTRUCTIVE BEHAVIOR  Goal: Remains free of harm/injury (restraint for non violent/non self-detsructive behavior)  Description: INTERVENTIONS:  - Instruct patient/family regarding restraint use   - Assess and monitor physiologic and psychological status   - Provide interventions and comfort measures to meet assessed patient needs   - Identify and implement measures to help patient regain control  - Assess readiness for release of restraint   Outcome: Progressing  Goal: Returns to optimal restraint-free functioning  Description: INTERVENTIONS:  - Assess the patient's behavior and symptoms that indicate continued need for restraint  - Identify and implement measures to help patient regain control  - Assess readiness for release of restraint   Outcome: Progressing today

## 2024-10-03 NOTE — PCC OCCUPATIONAL THERAPY
Pt continues to present with impairments in activity tolerance, endurance, standing balance/tolerance, sitting balance/tolerance, UE strength, FMC, GMC, arousal, memory, insight, safety , judgement , attention , sequencing , visual perceptual skills , depth perception , and (L) attention   Additional functional barriers include fatigue, pain, (L) hemiparesis, (L) visual deficits , decreased caregiver support, risk for falls, and home environment  Pt is functioning at overall Min-Mod A for ADLs and Min Ax1 w/ RW for fxnl mobility  Pt will continue to benefit from skilled OT services to address above mentioned barriers and maximize functional independence in baseline areas of occupation  OT D/C recommendation is for EDWIN  n/a

## 2025-04-28 NOTE — ASSESSMENT & PLAN NOTE
Secondary to  aspiration pneumonitis,  bilateral pleural effusion, deconditioning. He was transferred out of the ICU yesterday. Today she has increased oxygen requirement. She completed her antibiotic course. She has bilateral pleural effusion ascites and peripheral edema. We will give 1 dose of Lasix and monitor since her blood pressure is borderline low.   Low threshold for return back to the ICU if she decompensates stretcher

## 2025-06-08 NOTE — ASSESSMENT & PLAN NOTE
Wt Readings from Last 3 Encounters:   08/25/23 59.6 kg (131 lb 6.3 oz)   07/29/23 49.4 kg (108 lb 14.4 oz)   06/30/23 55.3 kg (122 lb)     -Hold further diuresis  -monitor I/o's  -ef 30%/ mod AR  -cont BB for now.  Hold if hypotension or bradycardia [Change in Activity] : no change in activity [Fever Above 102] : no fever [Rash] : no rash [Itching] : no itching [Nasal Stuffiness] : no nasal congestion [Sore Throat] : no sore throat [Vomiting] : no vomiting [Limping] : no limping [Joint Pains] : arthralgias [Joint Swelling] : no joint swelling [Appropriate Age Development] : development appropriate for age